# Patient Record
Sex: MALE | Race: ASIAN | NOT HISPANIC OR LATINO | Employment: FULL TIME | URBAN - METROPOLITAN AREA
[De-identification: names, ages, dates, MRNs, and addresses within clinical notes are randomized per-mention and may not be internally consistent; named-entity substitution may affect disease eponyms.]

---

## 2017-02-06 ENCOUNTER — HOSPITAL ENCOUNTER (OUTPATIENT)
Facility: MEDICAL CENTER | Age: 52
End: 2017-02-06
Attending: PHYSICIAN ASSISTANT
Payer: COMMERCIAL

## 2017-02-06 ENCOUNTER — OFFICE VISIT (OUTPATIENT)
Dept: URGENT CARE | Facility: CLINIC | Age: 52
End: 2017-02-06
Payer: COMMERCIAL

## 2017-02-06 VITALS
DIASTOLIC BLOOD PRESSURE: 72 MMHG | HEIGHT: 72 IN | HEART RATE: 98 BPM | RESPIRATION RATE: 16 BRPM | OXYGEN SATURATION: 96 % | TEMPERATURE: 98.3 F | WEIGHT: 189 LBS | SYSTOLIC BLOOD PRESSURE: 140 MMHG | BODY MASS INDEX: 25.6 KG/M2

## 2017-02-06 DIAGNOSIS — L05.01 PILONIDAL CYST WITH ABSCESS: ICD-10-CM

## 2017-02-06 PROCEDURE — 10061 I&D ABSCESS COMP/MULTIPLE: CPT | Performed by: PHYSICIAN ASSISTANT

## 2017-02-06 RX ORDER — AMOXICILLIN AND CLAVULANATE POTASSIUM 875; 125 MG/1; MG/1
1 TABLET, FILM COATED ORAL 2 TIMES DAILY
Qty: 14 TAB | Refills: 0 | Status: SHIPPED | OUTPATIENT
Start: 2017-02-06 | End: 2017-02-13

## 2017-02-06 RX ORDER — AMOXICILLIN 875 MG/1
875 TABLET, COATED ORAL 2 TIMES DAILY
Qty: 14 TAB | Refills: 0 | Status: SHIPPED | OUTPATIENT
Start: 2017-02-06 | End: 2017-02-06 | Stop reason: SDUPTHER

## 2017-02-06 ASSESSMENT — ENCOUNTER SYMPTOMS
SHORTNESS OF BREATH: 0
FEVER: 0
PALPITATIONS: 0
TINGLING: 0
COUGH: 0
CHILLS: 0
BACK PAIN: 1
LOSS OF CONSCIOUSNESS: 0
FOCAL WEAKNESS: 0

## 2017-02-06 NOTE — PROGRESS NOTES
"Subjective:      Israel Aviles is a 51 y.o. male who presents with Back Pain            Cyst  This is a new problem. The current episode started yesterday. The problem occurs constantly. The problem has been rapidly worsening. Pertinent negatives include no chest pain, chills, coughing, fever or rash. Associated symptoms comments: Swelling and drainage between the gluteal clefts. Nothing aggravates the symptoms. He has tried nothing for the symptoms.       Review of Systems   Constitutional: Negative for fever and chills.   Respiratory: Negative for cough and shortness of breath.    Cardiovascular: Negative for chest pain and palpitations.   Musculoskeletal: Positive for back pain.   Skin: Negative for rash.        Abscess in between the gluteal cleft   Neurological: Negative for tingling, focal weakness and loss of consciousness.   All other systems reviewed and are negative.  PMH:  has no past medical history on file.  MEDS:   Current outpatient prescriptions:   •  amoxicillin-clavulanate (AUGMENTIN) 875-125 MG Tab, Take 1 Tab by mouth 2 times a day for 7 days., Disp: 14 Tab, Rfl: 0  ALLERGIES: No Known Allergies  SURGHX: History reviewed. No pertinent past surgical history.  SOCHX:    FH: Family history was reviewed, no pertinent findings to report  Medications, Allergies, and current problem list reviewed today in Epic         Objective:     /72 mmHg  Pulse 98  Temp(Src) 36.8 °C (98.3 °F)  Resp 16  Ht 1.829 m (6' 0.01\")  Wt 85.73 kg (189 lb)  BMI 25.63 kg/m2  SpO2 96%     Physical Exam   Constitutional: He is oriented to person, place, and time. He appears well-developed and well-nourished.   Cardiovascular: Normal rate, regular rhythm, normal heart sounds, intact distal pulses and normal pulses.    Pulmonary/Chest: Effort normal and breath sounds normal.   Musculoskeletal: He exhibits tenderness. He exhibits no edema or deformity.        Legs:  Neurological: He is alert and oriented to person, " place, and time. He has normal reflexes. He displays normal reflexes. He exhibits normal muscle tone. Coordination normal.   Skin: There is erythema.   Abscess in the gluteal cleft   Psychiatric: He has a normal mood and affect. His behavior is normal. Judgment and thought content normal.   Vitals reviewed.              Assessment/Plan:   Patient is a 51-year-old male who presents with an abscess in between his gluteal cleft for 2 days. Patient states that he has had an abscess there before but 40 years ago. It is swollen and draining with moderate pain in the surrounding area. Patient denies fever. Vital signs normal. Observation appears to be pilonidal cyst that is semi-draining. Patient agrees to incision and drainage of the area. The area was cleaned with Betadine ×3 and injection of 2 cc of 1% lidocaine with epinephrine was injected. An 11 blade was then inserted into the area and a copious amount of purulent discharge was sinus from the area. It was cleansed with saline solution and packed with iodoform gauze. He is to return in 2 days for wound check. We will start him on Augmentin which is indicated for perianal abscesses. Cultures were taken.  1. Pilonidal cyst with abscess    - CULTURE WOUND W/ GRAM STAIN; Future  - amoxicillin-clavulanate (AUGMENTIN) 875-125 MG Tab; Take 1 Tab by mouth 2 times a day for 7 days.  Dispense: 14 Tab; Refill: 0     Differential diagnosis, natural history, supportive care, and indications for immediate follow-up discussed at length.   Follow-up with primary care provider within 4-5 days, emergency room precautions discussed.  Patient and/or family appears understanding of information.

## 2017-02-06 NOTE — MR AVS SNAPSHOT
"        Israel Aviles   2017 12:30 PM   Office Visit   MRN: [id number]    Department:  [facility]   Dept Phone:  [phone]    Description:  Male : 1965   Provider:  Carlos Westbrook PA-C           Reason for Visit     Back Pain small lumps-burst lower back x 1 day      Allergies as of 2017     No Known Allergies      You were diagnosed with     Pilonidal cyst with abscess   [685.0.ICD-9-CM]         Vital Signs     Blood Pressure Pulse Temperature Respirations Height Weight    140/72 mmHg 98 36.8 °C (98.3 °F) 16 1.829 m (6' 0.01\") 85.73 kg (189 lb)    Body Mass Index Oxygen Saturation                25.63 kg/m2 96%          Basic Information     Date Of Birth Sex Race Ethnicity Preferred Language    1965 Male Other  Origin (Turkmen,Israeli,Hong Konger,Lex, etc) English      Health Maintenance     Patient has no pending health maintenance at this time      Current Immunizations     No immunizations on file.      Below and/or attached are the medications your provider expects you to take. Review all of your home medications and newly ordered medications with your provider and/or pharmacist. Follow medication instructions as directed by your provider and/or pharmacist. Please keep your medication list with you and share with your provider. Update the information when medications are discontinued, doses are changed, or new medications (including over-the-counter products) are added; and carry medication information at all times in the event of emergency situations     Allergies:  No Known Allergies          Medications  Valid as of: 2017 -  1:36 PM    Generic Name Brand Name Tablet Size Instructions for use    Amoxicillin-Pot Clavulanate (Tab) AUGMENTIN 875-125 MG Take 1 Tab by mouth 2 times a day for 7 days.        .                 Medicines prescribed today were sent to:     [facility] - [place] (), [place] - [address]    [address] " [place] [address]    Phone: [phone] Fax: [phone]    Open 24 Hours?: No      Medication refill instructions:       If your prescription bottle indicates you have medication refills left, it is not necessary to call your provider’s office. Please contact your pharmacy and they will refill your medication.    If your prescription bottle indicates you do not have any refills left, you may request refills at any time through one of the following ways: The online [employer] system (except Urgent Care), by calling your provider’s office, or by asking your pharmacy to contact your provider’s office with a refill request. Medication refills are processed only during regular business hours and may not be available until the next business day. Your provider may request additional information or to have a follow-up visit with you prior to refilling your medication.   *Please Note: Medication refills are assigned a new Rx number when refilled electronically. Your pharmacy may indicate that no refills were authorized even though a new prescription for the same medication is available at the pharmacy. Please request the medicine by name with the pharmacy before contacting your provider for a refill.        Your To Do List     Future Labs/Procedures Complete By Expires    CULTURE WOUND W/ GRAM STAIN  As directed 2/6/2018      Instructions    Incision and Drainage of a Pilonidal Cyst  Incision and drainage is a surgical procedure to open and drain a fluid-filled sac that forms around a hair follicle in the tailbone area between your buttocks (pilonidal cyst). You may need this procedure if the cyst becomes painful, swollen, or infected.  There are three types of procedures that may be done. The type of procedure you have depends on the size and severity of your infected cyst. The procedure may be:  · Incision and drainage with a special type of bandage (wound packing). Packing is used for wounds that are deep or tunnel under  the skin.  · Marsupialization. In this procedure, the cyst will be opened and kept open. The edges of the incision will be stitched together to make a pocket.  · Incision and drainage without wound packing.  LET YOUR HEALTH CARE PROVIDER KNOW ABOUT:  · Any allergies you have.  · All medicines you are taking, including vitamins, herbs, eye drops, creams, and over-the-counter medicines.  · Previous problems you or members of your family have had with the use of anesthetics.  · Any blood disorders you have.  · Previous surgeries you have had.  · Medical conditions you have.  RISKS AND COMPLICATIONS  Generally, this is a safe procedure. However, problems can occur and include:  · Infection.  · Bleeding.  · Having another cyst develop.  · Need for more surgery.  BEFORE THE PROCEDURE  · Ask your health care provider about:  ¨ Changing or stopping your regular medicines. This is especially important if you are taking diabetes medicines or blood thinners.  ¨ Taking medicines such as aspirin and ibuprofen. These medicines can thin your blood. Do not take these medicines before your procedure if your health care provider tells you not to.  ¨ Taking antibiotics before surgery to control the infection.  · Do not eat or drink anything for 6-8 hours before the procedure if you are having general anesthesia.  · Take a shower the night before the procedure to clean your buttocks area. Take another shower in the morning before surgery.  · Plan to have someone take you home after the procedure.  PROCEDURE   · You will have an IV tube inserted in a vein in your hand or arm.  · You will be given one of the following:  ¨ A medicine that numbs the area (local anesthetic).  ¨ A medicine that makes you go to sleep (general anesthetic).  · You also may be given medicine to help you relax during the procedure (sedative).  · You will lie face down on the operating table.  · Your buttocks area may be shaved.  · Tape may be used to spread your  buttocks.  · Germ-killing solution (antiseptic) may be used to clean the area.  Incision and Drainage With Wound Packing:   · Your surgeon will make a surgical cut (incision) over the cyst to open it.  · A probe may be used to see if there are tunnels extending away from the cyst under your skin.  · Fluid or pus inside the cyst will be drained.  · The cyst will be flushed out with a germ-free (sterile) solution.  · Packing will be placed into the open cyst. This keeps it open and draining after surgery.  · The area will be covered with a bandage (dressing).  Marsupialization:  · Your surgeon will make a surgical cut (incision) over the cyst to open it.  · A probe may be used to see if there are tunnels extending away from the cyst under your skin.  · Fluid or pus inside the cyst will be drained.  · The cyst will be flushed out with a germ-free (sterile) solution.  · The edges of the incision will be stitched (sutured) to the skin to keep it wide open. The cyst will not be packed.  · A rolled-up bandage (dressing) will be taped over the incision.  Incision and Drainage Without Packing:   · Your surgeon will make a surgical cut (incision) over the cyst to open it.  · A probe may be used to see if there are tunnels extending away from the cyst under your skin.  · Fluid or pus inside the cyst will be drained.  · The cyst will be flushed out with a germ-free (sterile) solution.  · Your surgeon may also remove the tissue around the opened cyst.  · The incision then will be closed with stitches (sutures). It will not be left open, and packing will not be used.  · A bandage (dressing) will be put over the incision area.  AFTER THE PROCEDURE  · If you had general anesthesia, you will be taken to a recovery area. Your blood pressure, heart rate, breathing rate, and blood oxygen level will be monitored often until the medicines you were given have worn off.  · It is normal to have some pain after this procedure. You may be  given pain medicine.  · Your IV tube can be taken out after you have recovered and your pain is under control.     This information is not intended to replace advice given to you by your health care provider. Make sure you discuss any questions you have with your health care provider.     Document Released: 07/15/2015 Document Reviewed: 07/15/2015  [employer] Interactive Patient Education ©2016 Elsevier Inc.            Squee Access Code: [email or web address]  Expires: 3/8/2017  1:22 PM    Your email address is not on file at [facility].  Email Addresses are required for you to sign up for Squee, please contact [phone] to verify your personal information and to provide your email address prior to attempting to register for Squee.    Israel Aviles  [address]  [place] [address]    Squee  A secure, online tool to manage your health information     [facility] [facility]’s Squee® is a secure, online tool that connects you to your personalized health information from the privacy of your home -- day or night - making it very easy for you to manage your healthcare. Once the activation process is completed, you can even access your medical information using the Squee bridgett, which is available for free in the Apple Bridgett store or Google Play store.     To learn more about Squee, visit [email or web address]    There are two levels of access available (as shown below):   My Chart Features  [facility] Primary Care Doctor [facility]  Specialists [facility]  Urgent  Care Non-[facility] Primary Care Doctor   Email your healthcare team securely and privately 24/7 X X X    Manage appointments: schedule your next appointment; view details of past/upcoming appointments X      Request prescription refills. X      View recent personal medical records, including lab and immunizations X X X X   View health record, including health history, allergies, medications X X X X   Read reports about your outpatient visits, procedures, consult and  ER notes X X X X   See your discharge summary, which is a recap of your hospital and/or ER visit that includes your diagnosis, lab results, and care plan X X  X     How to register for [employer]:  Once your e-mail address has been verified, follow the following steps to sign up for [employer].     1. Go to  [email or web address]  2. Click on the Sign Up Now box, which takes you to the New Member Sign Up page. You will need to provide the following information:  a. Enter your [employer] Access Code exactly as it appears at the top of this page. (You will not need to use this code after you’ve completed the sign-up process. If you do not sign up before the expiration date, you must request a new code.)   b. Enter your date of birth.   c. Enter your home email address.   d. Click Submit, and follow the next screen’s instructions.  3. Create a [employer] ID. This will be your [employer] login ID and cannot be changed, so think of one that is secure and easy to remember.  4. Create a [employer] password. You can change your password at any time.  5. Enter your Password Reset Question and Answer. This can be used at a later time if you forget your password.   6. Enter your e-mail address. This allows you to receive e-mail notifications when new information is available in [employer].  7. Click Sign Up. You can now view your health information.    For assistance activating your [employer] account, call [phone]

## 2017-02-06 NOTE — PATIENT INSTRUCTIONS
Incision and Drainage of a Pilonidal Cyst  Incision and drainage is a surgical procedure to open and drain a fluid-filled sac that forms around a hair follicle in the tailbone area between your buttocks (pilonidal cyst). You may need this procedure if the cyst becomes painful, swollen, or infected.  There are three types of procedures that may be done. The type of procedure you have depends on the size and severity of your infected cyst. The procedure may be:  · Incision and drainage with a special type of bandage (wound packing). Packing is used for wounds that are deep or tunnel under the skin.  · Marsupialization. In this procedure, the cyst will be opened and kept open. The edges of the incision will be stitched together to make a pocket.  · Incision and drainage without wound packing.  LET YOUR HEALTH CARE PROVIDER KNOW ABOUT:  · Any allergies you have.  · All medicines you are taking, including vitamins, herbs, eye drops, creams, and over-the-counter medicines.  · Previous problems you or members of your family have had with the use of anesthetics.  · Any blood disorders you have.  · Previous surgeries you have had.  · Medical conditions you have.  RISKS AND COMPLICATIONS  Generally, this is a safe procedure. However, problems can occur and include:  · Infection.  · Bleeding.  · Having another cyst develop.  · Need for more surgery.  BEFORE THE PROCEDURE  · Ask your health care provider about:  ¨ Changing or stopping your regular medicines. This is especially important if you are taking diabetes medicines or blood thinners.  ¨ Taking medicines such as aspirin and ibuprofen. These medicines can thin your blood. Do not take these medicines before your procedure if your health care provider tells you not to.  ¨ Taking antibiotics before surgery to control the infection.  · Do not eat or drink anything for 6-8 hours before the procedure if you are having general anesthesia.  · Take a shower the night before the  procedure to clean your buttocks area. Take another shower in the morning before surgery.  · Plan to have someone take you home after the procedure.  PROCEDURE   · You will have an IV tube inserted in a vein in your hand or arm.  · You will be given one of the following:  ¨ A medicine that numbs the area (local anesthetic).  ¨ A medicine that makes you go to sleep (general anesthetic).  · You also may be given medicine to help you relax during the procedure (sedative).  · You will lie face down on the operating table.  · Your buttocks area may be shaved.  · Tape may be used to spread your buttocks.  · Germ-killing solution (antiseptic) may be used to clean the area.  Incision and Drainage With Wound Packing:   · Your surgeon will make a surgical cut (incision) over the cyst to open it.  · A probe may be used to see if there are tunnels extending away from the cyst under your skin.  · Fluid or pus inside the cyst will be drained.  · The cyst will be flushed out with a germ-free (sterile) solution.  · Packing will be placed into the open cyst. This keeps it open and draining after surgery.  · The area will be covered with a bandage (dressing).  Marsupialization:  · Your surgeon will make a surgical cut (incision) over the cyst to open it.  · A probe may be used to see if there are tunnels extending away from the cyst under your skin.  · Fluid or pus inside the cyst will be drained.  · The cyst will be flushed out with a germ-free (sterile) solution.  · The edges of the incision will be stitched (sutured) to the skin to keep it wide open. The cyst will not be packed.  · A rolled-up bandage (dressing) will be taped over the incision.  Incision and Drainage Without Packing:   · Your surgeon will make a surgical cut (incision) over the cyst to open it.  · A probe may be used to see if there are tunnels extending away from the cyst under your skin.  · Fluid or pus inside the cyst will be drained.  · The cyst will be  flushed out with a germ-free (sterile) solution.  · Your surgeon may also remove the tissue around the opened cyst.  · The incision then will be closed with stitches (sutures). It will not be left open, and packing will not be used.  · A bandage (dressing) will be put over the incision area.  AFTER THE PROCEDURE  · If you had general anesthesia, you will be taken to a recovery area. Your blood pressure, heart rate, breathing rate, and blood oxygen level will be monitored often until the medicines you were given have worn off.  · It is normal to have some pain after this procedure. You may be given pain medicine.  · Your IV tube can be taken out after you have recovered and your pain is under control.     This information is not intended to replace advice given to you by your health care provider. Make sure you discuss any questions you have with your health care provider.     Document Released: 07/15/2015 Document Reviewed: 07/15/2015  [employer] Interactive Patient Education ©2016 Elsevier Inc.

## 2018-06-15 ENCOUNTER — APPOINTMENT (OUTPATIENT)
Dept: RADIOLOGY | Facility: MEDICAL CENTER | Age: 53
DRG: 066 | End: 2018-06-15
Attending: EMERGENCY MEDICINE
Payer: COMMERCIAL

## 2018-06-15 ENCOUNTER — HOSPITAL ENCOUNTER (INPATIENT)
Facility: MEDICAL CENTER | Age: 53
LOS: 2 days | DRG: 066 | End: 2018-06-17
Attending: EMERGENCY MEDICINE | Admitting: FAMILY MEDICINE
Payer: COMMERCIAL

## 2018-06-15 ENCOUNTER — OFFICE VISIT (OUTPATIENT)
Dept: URGENT CARE | Facility: CLINIC | Age: 53
End: 2018-06-15
Payer: COMMERCIAL

## 2018-06-15 VITALS
HEIGHT: 72 IN | WEIGHT: 184 LBS | BODY MASS INDEX: 24.92 KG/M2 | RESPIRATION RATE: 16 BRPM | OXYGEN SATURATION: 98 % | SYSTOLIC BLOOD PRESSURE: 158 MMHG | HEART RATE: 80 BPM | DIASTOLIC BLOOD PRESSURE: 84 MMHG | TEMPERATURE: 98.2 F

## 2018-06-15 DIAGNOSIS — I63.9 CEREBROVASCULAR ACCIDENT (CVA), UNSPECIFIED MECHANISM (HCC): ICD-10-CM

## 2018-06-15 DIAGNOSIS — R47.81 SLURRED SPEECH: ICD-10-CM

## 2018-06-15 DIAGNOSIS — R29.810 FACIAL DROOP: ICD-10-CM

## 2018-06-15 LAB
ALBUMIN SERPL BCP-MCNC: 4.3 G/DL (ref 3.2–4.9)
ALBUMIN/GLOB SERPL: 0.9 G/DL
ALP SERPL-CCNC: 72 U/L (ref 30–99)
ALT SERPL-CCNC: 29 U/L (ref 2–50)
ANION GAP SERPL CALC-SCNC: 10 MMOL/L (ref 0–11.9)
APTT PPP: 29.1 SEC (ref 24.7–36)
AST SERPL-CCNC: 23 U/L (ref 12–45)
BASOPHILS # BLD AUTO: 0.1 % (ref 0–1.8)
BASOPHILS # BLD: 0.01 K/UL (ref 0–0.12)
BILIRUB SERPL-MCNC: 0.6 MG/DL (ref 0.1–1.5)
BUN SERPL-MCNC: 15 MG/DL (ref 8–22)
CALCIUM SERPL-MCNC: 9.7 MG/DL (ref 8.5–10.5)
CHLORIDE SERPL-SCNC: 100 MMOL/L (ref 96–112)
CHOLEST SERPL-MCNC: 262 MG/DL (ref 100–199)
CO2 SERPL-SCNC: 26 MMOL/L (ref 20–33)
CREAT SERPL-MCNC: 0.85 MG/DL (ref 0.5–1.4)
EOSINOPHIL # BLD AUTO: 0.01 K/UL (ref 0–0.51)
EOSINOPHIL NFR BLD: 0.1 % (ref 0–6.9)
ERYTHROCYTE [DISTWIDTH] IN BLOOD BY AUTOMATED COUNT: 42.7 FL (ref 35.9–50)
GLOBULIN SER CALC-MCNC: 4.7 G/DL (ref 1.9–3.5)
GLUCOSE SERPL-MCNC: 313 MG/DL (ref 65–99)
HCT VFR BLD AUTO: 45.2 % (ref 42–52)
HDLC SERPL-MCNC: 54 MG/DL
HGB BLD-MCNC: 15.6 G/DL (ref 14–18)
IMM GRANULOCYTES # BLD AUTO: 0.03 K/UL (ref 0–0.11)
IMM GRANULOCYTES NFR BLD AUTO: 0.4 % (ref 0–0.9)
INR PPP: 1.01 (ref 0.87–1.13)
LDLC SERPL CALC-MCNC: 182 MG/DL
LYMPHOCYTES # BLD AUTO: 1.46 K/UL (ref 1–4.8)
LYMPHOCYTES NFR BLD: 18 % (ref 22–41)
MCH RBC QN AUTO: 30.8 PG (ref 27–33)
MCHC RBC AUTO-ENTMCNC: 34.5 G/DL (ref 33.7–35.3)
MCV RBC AUTO: 89.2 FL (ref 81.4–97.8)
MONOCYTES # BLD AUTO: 0.12 K/UL (ref 0–0.85)
MONOCYTES NFR BLD AUTO: 1.5 % (ref 0–13.4)
NEUTROPHILS # BLD AUTO: 6.5 K/UL (ref 1.82–7.42)
NEUTROPHILS NFR BLD: 79.9 % (ref 44–72)
NRBC # BLD AUTO: 0 K/UL
NRBC BLD-RTO: 0 /100 WBC
PLATELET # BLD AUTO: 178 K/UL (ref 164–446)
PMV BLD AUTO: 11.2 FL (ref 9–12.9)
POTASSIUM SERPL-SCNC: 3.8 MMOL/L (ref 3.6–5.5)
PROT SERPL-MCNC: 9 G/DL (ref 6–8.2)
PROTHROMBIN TIME: 13 SEC (ref 12–14.6)
RBC # BLD AUTO: 5.07 M/UL (ref 4.7–6.1)
SODIUM SERPL-SCNC: 136 MMOL/L (ref 135–145)
TRIGL SERPL-MCNC: 132 MG/DL (ref 0–149)
TROPONIN I SERPL-MCNC: <0.01 NG/ML (ref 0–0.04)
TSH SERPL DL<=0.005 MIU/L-ACNC: 1.78 UIU/ML (ref 0.38–5.33)
WBC # BLD AUTO: 8.1 K/UL (ref 4.8–10.8)

## 2018-06-15 PROCEDURE — 700111 HCHG RX REV CODE 636 W/ 250 OVERRIDE (IP): Performed by: EMERGENCY MEDICINE

## 2018-06-15 PROCEDURE — 700102 HCHG RX REV CODE 250 W/ 637 OVERRIDE(OP): Performed by: EMERGENCY MEDICINE

## 2018-06-15 PROCEDURE — 85610 PROTHROMBIN TIME: CPT

## 2018-06-15 PROCEDURE — 70551 MRI BRAIN STEM W/O DYE: CPT

## 2018-06-15 PROCEDURE — 770006 HCHG ROOM/CARE - MED/SURG/GYN SEMI*

## 2018-06-15 PROCEDURE — 70486 CT MAXILLOFACIAL W/O DYE: CPT

## 2018-06-15 PROCEDURE — 99285 EMERGENCY DEPT VISIT HI MDM: CPT

## 2018-06-15 PROCEDURE — 80061 LIPID PANEL: CPT

## 2018-06-15 PROCEDURE — 83036 HEMOGLOBIN GLYCOSYLATED A1C: CPT

## 2018-06-15 PROCEDURE — 80053 COMPREHEN METABOLIC PANEL: CPT

## 2018-06-15 PROCEDURE — A9270 NON-COVERED ITEM OR SERVICE: HCPCS | Performed by: EMERGENCY MEDICINE

## 2018-06-15 PROCEDURE — 84443 ASSAY THYROID STIM HORMONE: CPT

## 2018-06-15 PROCEDURE — 99215 OFFICE O/P EST HI 40 MIN: CPT | Performed by: PHYSICIAN ASSISTANT

## 2018-06-15 PROCEDURE — 85025 COMPLETE CBC W/AUTO DIFF WBC: CPT

## 2018-06-15 PROCEDURE — 70450 CT HEAD/BRAIN W/O DYE: CPT

## 2018-06-15 PROCEDURE — 85730 THROMBOPLASTIN TIME PARTIAL: CPT

## 2018-06-15 PROCEDURE — 84484 ASSAY OF TROPONIN QUANT: CPT

## 2018-06-15 RX ORDER — BISACODYL 10 MG
10 SUPPOSITORY, RECTAL RECTAL
Status: DISCONTINUED | OUTPATIENT
Start: 2018-06-15 | End: 2018-06-17 | Stop reason: HOSPADM

## 2018-06-15 RX ORDER — AMOXICILLIN 250 MG
2 CAPSULE ORAL 2 TIMES DAILY
Status: DISCONTINUED | OUTPATIENT
Start: 2018-06-15 | End: 2018-06-17 | Stop reason: HOSPADM

## 2018-06-15 RX ORDER — ASPIRIN 325 MG
325 TABLET ORAL ONCE
Status: DISCONTINUED | OUTPATIENT
Start: 2018-06-15 | End: 2018-06-16

## 2018-06-15 RX ORDER — METFORMIN HYDROCHLORIDE 500 MG/1
500 TABLET, EXTENDED RELEASE ORAL DAILY
COMMUNITY
End: 2019-07-05

## 2018-06-15 RX ORDER — ATORVASTATIN CALCIUM 10 MG/1
10 TABLET, FILM COATED ORAL
Status: DISCONTINUED | OUTPATIENT
Start: 2018-06-15 | End: 2018-06-16

## 2018-06-15 RX ORDER — ENALAPRILAT 1.25 MG/ML
1.25 INJECTION INTRAVENOUS EVERY 6 HOURS PRN
Status: DISCONTINUED | OUTPATIENT
Start: 2018-06-15 | End: 2018-06-17 | Stop reason: HOSPADM

## 2018-06-15 RX ORDER — DEXTROSE MONOHYDRATE 25 G/50ML
25 INJECTION, SOLUTION INTRAVENOUS
Status: DISCONTINUED | OUTPATIENT
Start: 2018-06-15 | End: 2018-06-17 | Stop reason: HOSPADM

## 2018-06-15 RX ORDER — DIPHENHYDRAMINE HCL 25 MG
25 TABLET ORAL ONCE
Status: COMPLETED | OUTPATIENT
Start: 2018-06-15 | End: 2018-06-15

## 2018-06-15 RX ORDER — POLYETHYLENE GLYCOL 3350 17 G/17G
1 POWDER, FOR SOLUTION ORAL
Status: DISCONTINUED | OUTPATIENT
Start: 2018-06-15 | End: 2018-06-17 | Stop reason: HOSPADM

## 2018-06-15 RX ORDER — PROMETHAZINE HYDROCHLORIDE 12.5 MG/1
12.5-25 SUPPOSITORY RECTAL EVERY 4 HOURS PRN
Status: DISCONTINUED | OUTPATIENT
Start: 2018-06-15 | End: 2018-06-17 | Stop reason: HOSPADM

## 2018-06-15 RX ORDER — SODIUM CHLORIDE 9 MG/ML
INJECTION, SOLUTION INTRAVENOUS CONTINUOUS
Status: DISCONTINUED | OUTPATIENT
Start: 2018-06-15 | End: 2018-06-16

## 2018-06-15 RX ORDER — PREDNISONE 20 MG/1
60 TABLET ORAL ONCE
Status: COMPLETED | OUTPATIENT
Start: 2018-06-15 | End: 2018-06-15

## 2018-06-15 RX ORDER — GLIMEPIRIDE 4 MG/1
4 TABLET ORAL EVERY MORNING
COMMUNITY
End: 2019-05-09

## 2018-06-15 RX ORDER — PROMETHAZINE HYDROCHLORIDE 25 MG/1
12.5-25 TABLET ORAL EVERY 4 HOURS PRN
Status: DISCONTINUED | OUTPATIENT
Start: 2018-06-15 | End: 2018-06-17 | Stop reason: HOSPADM

## 2018-06-15 RX ORDER — METFORMIN HYDROCHLORIDE 500 MG/1
500 TABLET, EXTENDED RELEASE ORAL DAILY
COMMUNITY
End: 2018-06-15

## 2018-06-15 RX ORDER — ACETAMINOPHEN 325 MG/1
650 TABLET ORAL EVERY 6 HOURS PRN
Status: DISCONTINUED | OUTPATIENT
Start: 2018-06-15 | End: 2018-06-17 | Stop reason: HOSPADM

## 2018-06-15 RX ORDER — ONDANSETRON 4 MG/1
4 TABLET, ORALLY DISINTEGRATING ORAL EVERY 4 HOURS PRN
Status: DISCONTINUED | OUTPATIENT
Start: 2018-06-15 | End: 2018-06-17 | Stop reason: HOSPADM

## 2018-06-15 RX ORDER — ONDANSETRON 2 MG/ML
4 INJECTION INTRAMUSCULAR; INTRAVENOUS EVERY 4 HOURS PRN
Status: DISCONTINUED | OUTPATIENT
Start: 2018-06-15 | End: 2018-06-17 | Stop reason: HOSPADM

## 2018-06-15 RX ADMIN — PREDNISONE 60 MG: 20 TABLET ORAL at 17:39

## 2018-06-15 RX ADMIN — DIPHENHYDRAMINE HCL 25 MG: 25 TABLET ORAL at 17:39

## 2018-06-15 ASSESSMENT — ENCOUNTER SYMPTOMS
SORE THROAT: 0
FOCAL WEAKNESS: 1
VOMITING: 0
SPEECH CHANGE: 1
MYALGIAS: 0
NUMBNESS: 0
BLURRED VISION: 0
TINGLING: 0
WEAKNESS: 1
ANOREXIA: 0
JOINT SWELLING: 0
PALPITATIONS: 0
FEVER: 0
SWOLLEN GLANDS: 0
PHOTOPHOBIA: 0
COUGH: 0
CHANGE IN BOWEL HABIT: 0
SENSORY CHANGE: 1
SHORTNESS OF BREATH: 0
HEADACHES: 0
DOUBLE VISION: 0
DIZZINESS: 0
NAUSEA: 0
VISUAL CHANGE: 0

## 2018-06-15 ASSESSMENT — PAIN SCALES - GENERAL: PAINLEVEL_OUTOF10: 0

## 2018-06-15 NOTE — PROGRESS NOTES
Subjective:      Israel Aviles is a 52 y.o. male who presents with Other (right side of face is tingling/numb and drooping )            Other   This is a new problem. The current episode started today (6:30 am- 7 hours ago ). The problem occurs constantly. The problem has been unchanged. Associated symptoms include weakness. Pertinent negatives include no anorexia, change in bowel habit, chest pain, congestion, coughing, fever, headaches, joint swelling, myalgias, nausea, numbness, rash, sore throat, swollen glands, urinary symptoms, visual change or vomiting. Associated symptoms comments: Slurred speech, right lower facial drooping. Nothing aggravates the symptoms. He has tried nothing for the symptoms.     Past Medical History:   Diagnosis Date   • Diabetes (HCC)        History reviewed. No pertinent surgical history.    History reviewed. No pertinent family history.    Review of Systems   Constitutional: Negative for fever.   HENT: Negative for congestion, ear discharge, ear pain and sore throat.    Eyes: Negative for blurred vision, double vision and photophobia.   Respiratory: Negative for cough and shortness of breath.    Cardiovascular: Negative for chest pain, palpitations and leg swelling.   Gastrointestinal: Negative for anorexia, change in bowel habit, nausea and vomiting.   Genitourinary: Negative for dysuria.   Musculoskeletal: Negative for joint swelling and myalgias.   Skin: Negative for rash.   Neurological: Positive for sensory change, speech change, focal weakness and weakness. Negative for dizziness, tingling, numbness and headaches.     All other systems reviewed and are negative.        Objective:     /84   Pulse 80   Temp 36.8 °C (98.2 °F)   Resp 16   Ht 1.829 m (6')   Wt 83.5 kg (184 lb)   SpO2 98%   BMI 24.95 kg/m²      Physical Exam   Constitutional: He is oriented to person, place, and time. He appears well-developed and well-nourished. No distress.   HENT:   Head:  Normocephalic and atraumatic.       Right Ear: Tympanic membrane, external ear and ear canal normal.   Left Ear: Tympanic membrane, external ear and ear canal normal.   Eyes: Conjunctivae and EOM are normal. Pupils are equal, round, and reactive to light.   Cardiovascular: Normal rate, regular rhythm and normal heart sounds.  Exam reveals no gallop and no friction rub.    No murmur heard.  Pulmonary/Chest: Effort normal and breath sounds normal. No respiratory distress. He has no wheezes. He has no rales.   Neurological: He is alert and oriented to person, place, and time. A cranial nerve deficit is present. No sensory deficit.   Skin: Skin is warm and dry. No rash noted.   Psychiatric: He has a normal mood and affect. His behavior is normal. Judgment and thought content normal. His speech is slurred.               Assessment/Plan:     1. Facial droop    2. Slurred speech    - Recommended [facility] transport to ER- patient and son refused as ER is right across the street. Patient signed AMA refusing [facility]  - Was concerned about possible Bell's Palsy but deficit is only localized to right lower face/mouth- otherwise neuro exam benign without pronator drift or one-sided body weakness  .  - UC AMA/Refusal of Treatment    Tried calling [facility] multiple times to speak with ERMD without answer.    Arianna Dunn P.A.-C.

## 2018-06-15 NOTE — ED NOTES
Assumed care of pt from KEENA Smith. Pt resting in stretcher. Resp even and unlabored. NAD. Fall precautions in place. Call bell in reach. Awaiting MD orders. Ongoing monitoring.

## 2018-06-15 NOTE — ED TRIAGE NOTES
Ambulates to triage with son  Chief Complaint   Patient presents with   • Sent by MD   • Facial Droop     R side facial droop, no other neuro deficits,  strong and equal     Noticed this at work today, no slurred speech or confusion, denies any vision changes to R eye.  Pt said he also has some tingling to the R side of his face.

## 2018-06-15 NOTE — ED NOTES
Pt roomed from [facility]. Gait steady. Pt presents for symptoms as stated in the triage note. Pt is accompanied by son. Pt states symptom onset was 0630 this AM. Pt reports R sided facial droop with numbness that has resolved. Pt denies any pain. Airway is patent. R cheek and lips are mildly swollen. Neuro exam is otherwise intact aside from R side facial droop.

## 2018-06-15 NOTE — ED PROVIDER NOTES
ED Provider Note    Scribed for Nirali Cheema M.D. by Curry Biggs. 6/15/2018, 2:51 PM.    Primary care provider: Pcp Pt States None  Means of arrival: Walk In  History obtained from: Patient  History limited by: None     CHIEF COMPLAINT  Chief Complaint   Patient presents with   • Sent by MD   • Facial Droop     R side facial droop, no other neuro deficits,  strong and equal     HPI  Israel Aviles is a 52 y.o. male who presents to the Emergency Department with right facial droop.   The patient reports he first noticed a right facial droop at 6:30 AM this morning. He reports his symptoms have been constant. He complains of right jaw pain that only occurs while actively yawning. The patient does tell me that he slept with his right face resting over his right arm during the night last night.   The patient denies any history of similar symptoms.   He denies any other focal weakness other than his right facial droop. He denies recent trauma or injury to his face. The patient has a past medical history of Diabetes. He denies any other past medical history. The patient has tolerated secretions without difficulty.   The patient denies any headache, nausea, vomiting, numbness, vision changes, or slurred speech.     REVIEW OF SYSTEMS  Pertinent positives include right facial droop. Pertinent negatives include no headache, nausea, vomiting, numbness, vision changes, or slurred speech.     PAST MEDICAL HISTORY   has a past medical history of Diabetes (HCC).    SURGICAL HISTORY  patient denies any surgical history    SOCIAL HISTORY  Social History   Substance Use Topics   • Smoking status: Never Smoker   • Smokeless tobacco: Never Used   • Alcohol use No      History   Drug Use No     FAMILY HISTORY  None noted.     CURRENT MEDICATIONS  Home Medications     Reviewed by Alesia Heredia R.N. (Registered Nurse) on 06/16/18 at 0203  Med List Status: Complete   Medication Last Dose Status   glimepiride (AMARYL)  4 MG Tab 6/15/2018 Active   metFORMIN ER (GLUCOPHAGE XR) 500 MG TABLET SR 24 HR 6/15/2018 Active              ALLERGIES  No Known Allergies    PHYSICAL EXAM  VITAL SIGNS: /94   Pulse 88   Temp 36.7 °C (98 °F) (Temporal)   Resp 14   Ht 1.829 m (6')   Wt 85.6 kg (188 lb 11.4 oz)   SpO2 97%   BMI 25.59 kg/m²   Constitutional: Alert in no apparent distress.  HENT: No signs of trauma, Bilateral external ears normal, Nose normal. No obvious facial swelling.   Eyes: Pupils are equal and reactive, Conjunctiva normal, Non-icteric.   Neck: Normal range of motion, No tenderness, Supple, No stridor.   Cardiovascular: Regular rate and rhythm, no murmurs.   Thorax & Lungs: Normal breath sounds, No respiratory distress, No wheezing, No chest tenderness.   Abdomen: Bowel sounds normal, Soft, No tenderness, No masses, No peritoneal signs.  Skin: Warm, Dry, No erythema, No rash.   Musculoskeletal:  No major deformities noted.   Neurologic: Slight decreased nasal labial fold at rest, no forehead involvement or difficulty closing his eye, but able to smile fully. No dense facial paralysis. Alert, moving all extremities without difficulty.     LABS  Labs Reviewed   CBC WITH DIFFERENTIAL - Abnormal; Notable for the following:        Result Value    Neutrophils-Polys 79.90 (*)     Lymphocytes 18.00 (*)     All other components within normal limits    Narrative:     Indicate which anticoagulants the patient is on:->NONE   COMP METABOLIC PANEL - Abnormal; Notable for the following:     Glucose 313 (*)     Total Protein 9.0 (*)     Globulin 4.7 (*)     All other components within normal limits    Narrative:     Indicate which anticoagulants the patient is on:->NONE   HEMOGLOBIN A1C - Abnormal; Notable for the following:     Glycohemoglobin 12.3 (*)     All other components within normal limits   LIPID PROFILE - Abnormal; Notable for the following:     Cholesterol,Tot 262 (*)      (*)     All other components within normal  limits    Narrative:     Indicate which anticoagulants the patient is on:->NONE   ACCU-CHEK GLUCOSE - Abnormal; Notable for the following:     Glucose - Accu-Ck 285 (*)     All other components within normal limits   ACCU-CHEK GLUCOSE - Abnormal; Notable for the following:     Glucose - Accu-Ck 277 (*)     All other components within normal limits   ACCU-CHEK GLUCOSE - Abnormal; Notable for the following:     Glucose - Accu-Ck 294 (*)     All other components within normal limits   ACCU-CHEK GLUCOSE - Abnormal; Notable for the following:     Glucose - Accu-Ck 357 (*)     All other components within normal limits   ACCU-CHEK GLUCOSE - Abnormal; Notable for the following:     Glucose - Accu-Ck 293 (*)     All other components within normal limits   TROPONIN    Narrative:     Indicate which anticoagulants the patient is on:->NONE   PROTHROMBIN TIME    Narrative:     Indicate which anticoagulants the patient is on:->NONE   APTT    Narrative:     Indicate which anticoagulants the patient is on:->NONE   ESTIMATED GFR    Narrative:     Indicate which anticoagulants the patient is on:->NONE   TSH   URINE DRUG SCREEN   CBC WITH DIFFERENTIAL   COMP METABOLIC PANEL     All labs reviewed by me.    RADIOLOGY  MR-MRA HEAD-W/O   Final Result         MRA OF THE [place] OF [place] WITHIN NORMAL LIMITS.      MR-MRA NECK-W/O   Final Result         1.  Normal MR angiogram of the carotid arteries and vertebral basilar system..      MR-BRAIN-W/O   Final Result            1.  Small ovoid focus of acute infarction involving the left posterior frontal periventricular white matter.      2.  Small acute focus of infarction in the left posterior frontal periventricular white matter.      3.  Mild periventricular and juxtacortical white matter changes consistent with chronic microvascular ischemic gliosis.      4.  Small chronic focus of lacunar type infarction in the right paramedian daysi.      CT-MAXILLOFACIAL W/O PLUS RECONS   Final Result       1.  No facial fracture.   2.  Chronic maxillary sinus disease, more extensive on the RIGHT.   3.  No focal soft tissue inflammatory changes.  Limited by lack of IV contrast.      CT-HEAD W/O   Final Result      No acute intracranial abnormality.      ECHOCARDIOGRAM COMP W/O CONT    (Results Pending)     The radiologist's interpretation of all radiological studies have been reviewed by me.    COURSE & MEDICAL DECISION MAKING  Pertinent Labs & Imaging studies reviewed. (See chart for details)    Differential diagnoses include but are not limited to: Bell's palsy, CVA    2:51 PM - Patient seen and examined at bedside. Ordered CT Head, CT Maxillofacial, MR Brain to evaluate his symptoms.      5:20 PM Recheck: Patient re-evaluated at beside. Discussed imaging study results with the patient. He was updated of plan of care and medical decision making. He was treated with Benadryl 25 mg PO, Deltasone 60 mg PO.     9:49 PM Spoke with Dr. Lira, [facility], about the patient's condition.      Decision Making:  This is a 52 y.o. year old male who presents with right-sided facial droop.  On exam he had a slight droop of his right nasolabial fold however with effort patient had no asymmetry.  Initially I felt this could be Bell's however CVA is possible.  His symptoms started at 6:30 AM and on presentation at 2:50 PM he was well out of the window for alteplase.  I ordered a CT scan of his head and maxillofacial initially which were normal.  On reexamination and discussing more with his family  his wife mentioned that he had trouble with drooling out of that side of the mouth and that his speech was very slurred.  They also then informed me that he has a history of diabetes.  Given this additional history and odd presentation I did obtain an MRI.  The MRI did show evidence of acute CVA.  Given this the patient will be admitted to the family medicine service.  Given that he is well out of the alteplase window did not  think telemetry neuro consult would be helpful at this time.  Patient will be admitted for further workup.    Disposition:  Patient will be admitted to the hospital under the care of Dr. Lira ([facility]) in guarded condition.     FINAL IMPRESSION  1. Cerebrovascular accident (CVA), unspecified mechanism (HCC)         This dictation has been created using voice recognition software and/or scribes. The accuracy of the dictation is limited by the abilities of the software and the expertise of the scribes. I expect there may be some errors of grammar and possibly content. I made every attempt to manually correct the errors within my dictation. However, errors related to voice recognition software and/or scribes may still exist and should be interpreted within the appropriate context.     I, Curry Biggs (Scribe), am scribing for, and in the presence of, Nirali Cheema M.D..    Electronically signed by: Curry Biggs (Scribe), 6/15/2018    I, Nirali Cheema M.D. personally performed the services described in this documentation, as scribed by Curry Biggs in my presence, and it is both accurate and complete.    The note accurately reflects work and decisions made by me.  Nirali Cheema  6/17/2018  1:36 AM

## 2018-06-16 ENCOUNTER — APPOINTMENT (OUTPATIENT)
Dept: RADIOLOGY | Facility: MEDICAL CENTER | Age: 53
DRG: 066 | End: 2018-06-16
Attending: FAMILY MEDICINE
Payer: COMMERCIAL

## 2018-06-16 LAB
AMPHET UR QL SCN: NEGATIVE
BARBITURATES UR QL SCN: NEGATIVE
BENZODIAZ UR QL SCN: NEGATIVE
BZE UR QL SCN: NEGATIVE
CANNABINOIDS UR QL SCN: NEGATIVE
EST. AVERAGE GLUCOSE BLD GHB EST-MCNC: 306 MG/DL
GLUCOSE BLD-MCNC: 277 MG/DL (ref 65–99)
GLUCOSE BLD-MCNC: 285 MG/DL (ref 65–99)
GLUCOSE BLD-MCNC: 294 MG/DL (ref 65–99)
GLUCOSE BLD-MCNC: 357 MG/DL (ref 65–99)
HBA1C MFR BLD: 12.3 % (ref 0–5.6)
METHADONE UR QL SCN: NEGATIVE
OPIATES UR QL SCN: NEGATIVE
OXYCODONE UR QL SCN: NEGATIVE
PCP UR QL SCN: NEGATIVE
PROPOXYPH UR QL SCN: NEGATIVE

## 2018-06-16 PROCEDURE — 700102 HCHG RX REV CODE 250 W/ 637 OVERRIDE(OP): Performed by: FAMILY MEDICINE

## 2018-06-16 PROCEDURE — 70544 MR ANGIOGRAPHY HEAD W/O DYE: CPT

## 2018-06-16 PROCEDURE — 82962 GLUCOSE BLOOD TEST: CPT | Mod: 91

## 2018-06-16 PROCEDURE — G8997 SWALLOW GOAL STATUS: HCPCS | Mod: CH

## 2018-06-16 PROCEDURE — 70547 MR ANGIOGRAPHY NECK W/O DYE: CPT

## 2018-06-16 PROCEDURE — 770020 HCHG ROOM/CARE - TELE (206)

## 2018-06-16 PROCEDURE — G8979 MOBILITY GOAL STATUS: HCPCS | Mod: CH

## 2018-06-16 PROCEDURE — 80307 DRUG TEST PRSMV CHEM ANLYZR: CPT

## 2018-06-16 PROCEDURE — 700111 HCHG RX REV CODE 636 W/ 250 OVERRIDE (IP): Performed by: FAMILY MEDICINE

## 2018-06-16 PROCEDURE — G8980 MOBILITY D/C STATUS: HCPCS | Mod: CH

## 2018-06-16 PROCEDURE — G8996 SWALLOW CURRENT STATUS: HCPCS | Mod: CI

## 2018-06-16 PROCEDURE — 700105 HCHG RX REV CODE 258: Performed by: FAMILY MEDICINE

## 2018-06-16 PROCEDURE — G8978 MOBILITY CURRENT STATUS: HCPCS | Mod: CH

## 2018-06-16 PROCEDURE — 97161 PT EVAL LOW COMPLEX 20 MIN: CPT

## 2018-06-16 PROCEDURE — A9270 NON-COVERED ITEM OR SERVICE: HCPCS | Performed by: FAMILY MEDICINE

## 2018-06-16 PROCEDURE — 92610 EVALUATE SWALLOWING FUNCTION: CPT

## 2018-06-16 RX ORDER — ATORVASTATIN CALCIUM 40 MG/1
40 TABLET, FILM COATED ORAL
Status: DISCONTINUED | OUTPATIENT
Start: 2018-06-16 | End: 2018-06-17 | Stop reason: HOSPADM

## 2018-06-16 RX ORDER — ASPIRIN 300 MG/1
300 SUPPOSITORY RECTAL SEE ADMIN INSTRUCTIONS
Status: DISCONTINUED | OUTPATIENT
Start: 2018-06-16 | End: 2018-06-16

## 2018-06-16 RX ORDER — ASPIRIN 300 MG/1
300 SUPPOSITORY RECTAL ONCE
Status: COMPLETED | OUTPATIENT
Start: 2018-06-16 | End: 2018-06-16

## 2018-06-16 RX ORDER — ASPIRIN 325 MG
325 TABLET ORAL ONCE
Status: COMPLETED | OUTPATIENT
Start: 2018-06-16 | End: 2018-06-16

## 2018-06-16 RX ADMIN — ATORVASTATIN CALCIUM 40 MG: 40 TABLET, FILM COATED ORAL at 20:49

## 2018-06-16 RX ADMIN — STANDARDIZED SENNA CONCENTRATE AND DOCUSATE SODIUM 2 TABLET: 8.6; 5 TABLET, FILM COATED ORAL at 09:01

## 2018-06-16 RX ADMIN — INSULIN LISPRO 2 UNITS: 100 INJECTION, SOLUTION INTRAVENOUS; SUBCUTANEOUS at 06:51

## 2018-06-16 RX ADMIN — INSULIN LISPRO 3 UNITS: 100 INJECTION, SOLUTION INTRAVENOUS; SUBCUTANEOUS at 03:01

## 2018-06-16 RX ADMIN — INSULIN LISPRO 3 UNITS: 100 INJECTION, SOLUTION INTRAVENOUS; SUBCUTANEOUS at 12:00

## 2018-06-16 RX ADMIN — INSULIN LISPRO 3 UNITS: 100 INJECTION, SOLUTION INTRAVENOUS; SUBCUTANEOUS at 23:43

## 2018-06-16 RX ADMIN — SODIUM CHLORIDE: 9 INJECTION, SOLUTION INTRAVENOUS at 01:28

## 2018-06-16 RX ADMIN — INSULIN LISPRO 5 UNITS: 100 INJECTION, SOLUTION INTRAVENOUS; SUBCUTANEOUS at 18:10

## 2018-06-16 RX ADMIN — STANDARDIZED SENNA CONCENTRATE AND DOCUSATE SODIUM 2 TABLET: 8.6; 5 TABLET, FILM COATED ORAL at 20:50

## 2018-06-16 RX ADMIN — ASPIRIN 81 MG: 81 TABLET, COATED ORAL at 09:01

## 2018-06-16 RX ADMIN — ENOXAPARIN SODIUM 40 MG: 100 INJECTION SUBCUTANEOUS at 09:02

## 2018-06-16 RX ADMIN — ASPIRIN 300 MG: 300 SUPPOSITORY RECTAL at 04:27

## 2018-06-16 ASSESSMENT — LIFESTYLE VARIABLES
EVER_SMOKED: NEVER
ALCOHOL_USE: NO
EVER_SMOKED: NEVER

## 2018-06-16 ASSESSMENT — PAIN SCALES - GENERAL
PAINLEVEL_OUTOF10: 0
PAINLEVEL_OUTOF10: 0

## 2018-06-16 ASSESSMENT — COGNITIVE AND FUNCTIONAL STATUS - GENERAL
MOBILITY SCORE: 24
MOBILITY SCORE: 24
SUGGESTED CMS G CODE MODIFIER MOBILITY: CH
SUGGESTED CMS G CODE MODIFIER MOBILITY: CH
DAILY ACTIVITIY SCORE: 24
SUGGESTED CMS G CODE MODIFIER DAILY ACTIVITY: CH

## 2018-06-16 ASSESSMENT — PATIENT HEALTH QUESTIONNAIRE - PHQ9
2. FEELING DOWN, DEPRESSED, IRRITABLE, OR HOPELESS: NOT AT ALL
SUM OF ALL RESPONSES TO PHQ9 QUESTIONS 1 AND 2: 0
1. LITTLE INTEREST OR PLEASURE IN DOING THINGS: NOT AT ALL
SUM OF ALL RESPONSES TO PHQ9 QUESTIONS 1 AND 2: 0
2. FEELING DOWN, DEPRESSED, IRRITABLE, OR HOPELESS: NOT AT ALL
1. LITTLE INTEREST OR PLEASURE IN DOING THINGS: NOT AT ALL

## 2018-06-16 ASSESSMENT — COPD QUESTIONNAIRES
COPD SCREENING SCORE: 0
HAVE YOU SMOKED AT LEAST 100 CIGARETTES IN YOUR ENTIRE LIFE: NO/DON'T KNOW
DURING THE PAST 4 WEEKS HOW MUCH DID YOU FEEL SHORT OF BREATH: NONE/LITTLE OF THE TIME
DO YOU EVER COUGH UP ANY MUCUS OR PHLEGM?: NO/ONLY WITH OCCASIONAL COLDS OR INFECTIONS

## 2018-06-16 ASSESSMENT — GAIT ASSESSMENTS
DISTANCE (FEET): 1000
GAIT LEVEL OF ASSIST: SUPERVISED

## 2018-06-16 NOTE — THERAPY
"53 y/o male adm for right sided facial droop.  Presents with intact motor and sensory components of BLE, intact balance with high level balance activities and intact coordination. No furthera acute PT services required at this time. However, an additional visit may be requested as per his attending provider to address DC or equipment needs.     Physical Therapy Evaluation completed.   Bed Mobility:  Supine to Sit: Independent  Transfers: Sit to Stand: Supervised  Gait: Level Of Assist: Supervised with No Equipment Needed       Plan of Care: Patient with no further skilled PT needs in the acute care setting at this time  Discharge Recommendations: Equipment: No Equipment Needed. Post-acute therapy Currently anticipate no further skilled physical therapy needs once patient is discharged from the inpatient setting.    See \"Rehab Therapy-Acute\" Patient Summary Report for complete documentation.     "

## 2018-06-16 NOTE — CARE PLAN
Problem: Communication  Goal: The ability to communicate needs accurately and effectively will improve  Outcome: PROGRESSING AS EXPECTED      Problem: Safety  Goal: Will remain free from injury  Outcome: PROGRESSING AS EXPECTED  informed pt to use call light when using BR  Goal: Will remain free from falls  Outcome: PROGRESSING AS EXPECTED

## 2018-06-16 NOTE — ED NOTES
Pt resting in stretcher. Resp even and unlabored. NAD. Fall precautions in place. Call bell in reach. Ongoing monitoring.

## 2018-06-16 NOTE — PROGRESS NOTES
Received pt from ED via [id number] accompanied by family. Pt is A/Ox4, able to ambulate transfer to bed with steady gait. Denies pain and discomfort. NIHSS assessment done with a score of 3. Pt has right facial droop, aphasia and dysarthria. Pt placed on NPO due to facial droop until speech eval. 2RN skin assessment done with Shaniqua. Skin is intact no skin issue noted. BS of 277 medicated per sliding scale 3units administered. Call light within pt reach.

## 2018-06-16 NOTE — H&P
[facility] MEDICINE HISTORY AND PHYSICAL     PATIENT ID:  NAME:  Israel Aviles  MRN:               [id number]  YOB: 1965    Date of Admission: 6/15/2018     Attending: Dr. Dang    Resident: Dr. Lira, PGY3    Primary Care Physician:  Dr. MIGUELINA Bai    CC:  Facial droop and numbness    HPI: Israel Aviles is a 52 y.o. male who presented the ED this evening complaining of R sided facial droop and numbness on the R side of his face. He first noticed these when he awoke this AM at 06:30. Along with this he states that his tongue feels numb and weak as well. He states that nothing like this has happened to him before. He denies any extremity weakness or numbness, slurred speech, recent illness, recent travel, HA, vision changes, chest pain/palpitations, shortness of breath, abdominal pain, nausea, vomiting, diarrhea, bowel/bladder incontinence or LE edema. Pt does have a PMHx significant for DMT2.     REVIEW OF SYSTEMS:   Ten systems reviewed and were negative except as noted in the HPI.                PAST MEDICAL HISTORY:  Past Medical History:   Diagnosis Date   • Diabetes (HCC)        PAST SURGICAL HISTORY:  History reviewed. No pertinent surgical history.    FAMILY HISTORY:  History reviewed. No pertinent family history.   Mother and father both w/ DMT2    SOCIAL HISTORY:   Pt lives in [place]  Pt is a lacto-ovo vegitarian  Smoking denies  Etoh use denies  Drug use denies    DIET:   No orders of the defined types were placed in this encounter.  NPO until dysphagia screen is done    ALLERGIES:  No Known Allergies    OUTPATIENT MEDICATIONS:    Current Facility-Administered Medications:   •  senna-docusate (PERICOLACE or SENOKOT S) 8.6-50 MG per tablet 2 Tab, 2 Tab, Oral, BID **AND** polyethylene glycol/lytes (MIRALAX) PACKET 1 Packet, 1 Packet, Oral, QDAY PRN **AND** magnesium hydroxide (MILK OF MAGNESIA) suspension 30 mL, 30 mL, Oral, QDAY PRN **AND** bisacodyl (DULCOLAX) suppository 10 mg,  10 mg, Rectal, QDAY PRN, Rabia Lira M.D.  •  Respiratory Care per Protocol, , Nebulization, Continuous RT, Rabia Lira M.D.  •  NS infusion, , Intravenous, Continuous, Rabia Lira M.D., Last Rate: 75 mL/hr at 18 0128  •  enoxaparin (LOVENOX) inj 40 mg, 40 mg, Subcutaneous, DAILY, Rabia Lira M.D.  •  enalaprilat (VASOTEC) injection 1.25 mg, 1.25 mg, Intravenous, Q6HRS PRN, Rabia Lira M.D.  •  ondansetron (ZOFRAN) syringe/vial injection 4 mg, 4 mg, Intravenous, Q4HRS PRN, Rabia Lira M.D.  •  ondansetron (ZOFRAN ODT) dispertab 4 mg, 4 mg, Oral, Q4HRS PRN, Rabia Lira M.D.  •  promethazine (PHENERGAN) tablet 12.5-25 mg, 12.5-25 mg, Oral, Q4HRS PRN, Rabia Lira M.D.  •  promethazine (PHENERGAN) suppository 12.5-25 mg, 12.5-25 mg, Rectal, Q4HRS PRN, Rabia Lira M.D.  •  prochlorperazine (COMPAZINE) injection 5-10 mg, 5-10 mg, Intravenous, Q4HRS PRN, Rabia Lira M.D.  •  acetaminophen (TYLENOL) tablet 650 mg, 650 mg, Oral, Q6HRS PRN, Rabia Lira M.D.  •  insulin lispro (HUMALOG) injection 1-6 Units, 1-6 Units, Subcutaneous, Q6HRS **AND** Accu-Chek Q6 if NPO, , , Q6H **AND** NOTIFY MD and PharmD, , , Once **AND** glucose 4 g chewable tablet 16 g, 16 g, Oral, Q15 MIN PRN **AND** dextrose 50% (D50W) injection 25 mL, 25 mL, Intravenous, Q15 MIN PRN, Rabia Lira M.D.  •  aspirin (ASA) tablet 325 mg, 325 mg, Oral, Once, Rabia Lira M.D.  •  aspirin EC (ECOTRIN) tablet 81 mg, 81 mg, Oral, DAILY, Rabia Lira M.D.  •  atorvastatin (LIPITOR) tablet 10 mg, 10 mg, Oral, QHS, Rabia Lira M.D.    PHYSICAL EXAM:  Vitals:    18 0000 18 0100 18 0115 18 0155   BP: 139/76 136/71  122/88   Pulse: 74 75  96   Resp: 18 17  20   Temp:    36.1 °C (96.9 °F)   TempSrc:       SpO2:  96%  96%   Weight:   83.5 kg (184 lb 1.4 oz) 83.5 kg (184 lb 1.4 oz)   Height:   1.829 m (6') 1.829 m (6')   , Temp (24hrs), Av.4 °C (97.5 °F), Min:36.1 °C (96.9 °F),  Max:36.7 °C (98 °F)  , Pulse Oximetry: 96 %, O2 (LPM): 0    General: Pt resting in NAD, cooperative   Skin:  Pink, warm and dry.  No rashes  HEENT: NC/AT. PERRL. EOMI. MMM. No nasal discharge.   Neck:  Supple without lymphadenopathy or rigidity. No JVD   Lungs:  Symmetrical.  CTAB with no W/R/R.  Good air movement   Cardiovascular:  S1/S2 RRR without M/R/G.  Abdomen:  Abdomen is soft NT/ND. +BS. No masses noted.  Extremities:  Full range of motion. No gross deformities noted. 2+ pulses in all extremities. No C/C/E.  Strength and sensation in tact in B/L upper and lower extremities   Spine:  Straight without vertebral anomalies.  CNS:  Muscle tone is normal.  A&Ox4. Facial sensation intact. Decreased ability to smile on L side, decreased nasal labial fold, other wise CN grossly intact. No slurred speech.          LAB TESTS:   Recent Labs      06/15/18   2220   WBC  8.1   RBC  5.07   HEMOGLOBIN  15.6   HEMATOCRIT  45.2   MCV  89.2   MCH  30.8   RDW  42.7   PLATELETCT  178   MPV  11.2   NEUTSPOLYS  79.90*   LYMPHOCYTES  18.00*   MONOCYTES  1.50   EOSINOPHILS  0.10   BASOPHILS  0.10     Recent Labs      06/15/18   2220   TROPONINI  <0.01     Recent Labs      06/15/18   2220   SODIUM  136   POTASSIUM  3.8   CHLORIDE  100   CO2  26   BUN  15   CREATININE  0.85   CALCIUM  9.7   ALBUMIN  4.3       CULTURES:   Results     ** No results found for the last 168 hours. **          IMAGES:  6/15 MRI Brain-  1.  Small ovoid focus of acute infarction involving the left posterior frontal periventricular white matter.  2.  Small acute focus of infarction in the left posterior frontal periventricular white matter.  3.  Mild periventricular and juxtacortical white matter changes consistent with chronic microvascular ischemic gliosis.  4.  Small chronic focus of lacunar type infarction in the right paramedian daysi    6/15 Maxillofacial CT-  1.  No facial fracture.  2.  Chronic maxillary sinus disease, more extensive on the RIGHT.  3.   No focal soft tissue inflammatory changes.  Limited by lack of IV contrast.    6/15 CT Head-  No acute intracranial abnormality.    CONSULTS:   Tele Neuro-Dr. Jung (recs appreciated)    ASSESSMENT/PLAN: 52 y.o. male admitted for CVA leading to R sided facial numbness and droop.    #CVA:  -MRI Brain- Small ovoid focus of acute infarction involving the left posterior frontal periventricular white matter. Small acute              focus of infarction in the left posterior frontal periventricular white matter. Mild periventricular and juxtacortical                              white matter changes consistent with chronic microvascular ischemic gliosis. Small chronic focus of lacunar type                     infarction in the right paramedian daysi.  -Tele Neuro consulted (Dr. Jung, recs appreciated)       -TSH, Lipid Panel, A1c, UDS ordered       -MRA Head/Neck       -Echo (TTE)       -Dysphagia screen              -if passed 325 mg ASA, if does not, 300 mg ASA DC tonight              -start 81 mg ASA tomorrow              -Atorvastatin 40 mg QHS         -Permissive HTN       -Also recommended in-person Neuro see Pt in AM  -PT/OT/ST consulted  -IVF while NPO    #DMT2:  -Will hold home Metformin and Glimepiride  -ISS  -NPO  -Hypoglycemia protocol  -A1c pending    Dispo: Admit to Neuro w/ tele monitoring  PCP: Richardson  PPx: Lovenox  IVF: NS @ 75 cc/hr  Abx: None  Code Status: Full

## 2018-06-16 NOTE — CONSULTS
Brief Acute Teleneurology Note    Discussed case w/ Dr. Lira. No video consult done as presented outside time window, no acute intervention to be done.    Subjective  53 y/o M w/ PMH DMII, woke up this morning with R facial droop @0630. LKN the night before (6/14). MRI brain shows an acute ischemic stroke L posterior frontal periventricular region.    Objective  VS: OU=863/94 on arrival, down to 125/88 without intervention    Imaging - personally visualized and radiology reports reviewed  CT Head w/o (6/15/18)  No acute intracranial abnormality.    MRI Brain w/o (6/15/18)  1.  Small ovoid focus of acute infarction involving the left posterior frontal periventricular white matter.  2.  Small acute focus of infarction in the left posterior frontal periventricular white matter.  3.  Mild periventricular and juxtacortical white matter changes consistent with chronic microvascular ischemic gliosis.  4.  Small chronic focus of lacunar type infarction in the right paramedian daysi.    Assessment/Recommendations:  53 y/o M w/ PMH DMII-non-insulin dependent (on oral agents) presents w/ acute onset R facial droop and numbness since waking up this morning (6/15) @ 0630. Due to LKN the night prior (6/14), patient is outside the time window for IV tPA or intervention.    MRI shows acute lacunar infarction L periventricular posterior frontal region. Also shows old small ischemic infarction in R paramedian daysi. Etiology likely due to small vessel disease.    Acute Work-Up/Management   - Bedside dysphagia screen/sip test   - Stat Aspirin 325mg PO x1 (if passes above), or 300mg FL x1 (if fails), and then ST to clear   - Permissive HTN up to 220/120 x48 hours after sx onset (through tomorrow allow to autoregulate), with PRN IV meds if above this   - MRA head/neck   - TTE w/ bubble   - Telemetry/continuous cardiac monitoring while in hospital   - HgBA1C, Lipids, TSH, UDS    Secondary Stroke Prevention   - Antiplatelet: Aspirin 81mg  PO daily if he was not on aspirin at home already, if he was on and compliant w/ Aspirin at home would add Plavix 75mg PO daily to Aspirin 81mg daily x3 weeks then stop Aspirin and continue monotherapy w/ Plavix alone (MRA results may change timeframe of this)   - Anticoagulation: no known indication at this time and by imaging stroke etiology c/w lacunar/small vessel disease   - HTN: permissive as above to 220/120 if needed, through tomorrow (6/16), then slowly lower to goal per JNC-8 guidelines   - HLD: check lipids, start high-intensity statin with Atorvastatin 40mg PO nightly or greater, or Rosuvastatin 20mg nightly or greater   - DM: check A1C, accuchecks/SSI while inpatient, goal euglycemia   - BMI=25.5, borderline overweight,  on weight loss, diet/nutrition, exercise   - Inquire about tobacco, alcohol, drugs    Other   - ST/PT/OT if needs   - Pharmacologic DVT ppx while inpatient with Heparin or Lovenox    Thank you for involving teleneurology in the care of your patient, Mr. Aviles. Please page with questions or concerns.    Elaina Jung MD  [facility] [employer]  Acute Teleneurology

## 2018-06-16 NOTE — PROGRESS NOTES
[facility] FAMILY MEDICINE PROGRESS NOTE     Attending:   Donny Mcnally MD    Resident:   Floyd Magana DO, MPH    PATIENT:   Israel Aviles; [id number]; 1965    ID:   51yo male w/ a significant pmhx of DMII, admitted for recent cerebrovascular accident.    SUBJECTIVE:   No acute events overnight, pt reportedly slept well throughout the PM. No worsening of CN exam. PT w/o complaints this AM. Pt desiring to eat. Has been on a clear liquid diet since last PM. Currently denies headaches, CP, SOB, ABD pain, N/V, and change in bowel and bladder habits.    OBJECTIVE:  Vitals:    06/16/18 0100 06/16/18 0115 06/16/18 0155 06/16/18 0400   BP: 136/71  122/88 157/95   Pulse: 75  96 97   Resp: 17  20 18   Temp:   36.1 °C (96.9 °F) 36.2 °C (97.2 °F)   TempSrc:       SpO2: 96%  96% 96%   Weight:  83.5 kg (184 lb 1.4 oz) 83.5 kg (184 lb 1.4 oz)    Height:  1.829 m (6') 1.829 m (6')      No intake or output data in the 24 hours ending 06/16/18 0517    PHYSICAL EXAM:  General: Pt resting in NAD, cooperative   Skin:  Pink, warm and dry.  No rashes  HEENT: NC/AT. PERRL. EOMI. MMM. No nasal discharge.   Neck:  Supple without lymphadenopathy or rigidity. No JVD   Lungs:  Symmetrical.  CTAB with no W/R/R.  Good air movement   Cardiovascular:  S1/S2 RRR without M/R/G.  Abdomen:  Abdomen is soft NT/ND. +BS. No masses noted.  Extremities: Full range of motion. No gross deformities noted. 2+ pulses in all extremities. No C/C/E.  Strength and sensation in tact in B/L upper and lower extremities   Spine:  Straight without vertebral anomalies.  CNS:  Muscle tone is normal. AAOx3. Facial sensation intact. Decreased ability to smile on R side reportedly improved, decreased nasal labial fold, other wise CN grossly intact. No slurred speech.        LABS:  Recent Labs      06/15/18   2220   WBC  8.1   RBC  5.07   HEMOGLOBIN  15.6   HEMATOCRIT  45.2   MCV  89.2   MCH  30.8   RDW  42.7   PLATELETCT  178   MPV  11.2   NEUTSPOLYS  79.90*   LYMPHOCYTES   18.00*   MONOCYTES  1.50   EOSINOPHILS  0.10   BASOPHILS  0.10     Recent Labs      06/15/18   2220   SODIUM  136   POTASSIUM  3.8   CHLORIDE  100   CO2  26   BUN  15   CREATININE  0.85   CALCIUM  9.7   ALBUMIN  4.3     Estimated GFR/CRCL = Estimated Creatinine Clearance: 111.6 mL/min (by C-G formula based on SCr of 0.85 mg/dL).  Recent Labs      06/15/18   2220  06/16/18   0143  06/16/18   0258   GLUCOSE  313*   --    --    POCGLUCOSE   --   285*  277*     Recent Labs      06/15/18   2220   ASTSGOT  23   ALTSGPT  29   TBILIRUBIN  0.6   ALKPHOSPHAT  72   GLOBULIN  4.7*   INR  1.01     Recent Labs      06/15/18   2220   TROPONINI  <0.01           Invalid input(s): [id number]  Recent Labs      06/15/18   2220   INR  1.01   APTT  29.1         IMAGING:  No new imaging      MEDS:  Current Facility-Administered Medications   Medication Last Dose   • senna-docusate (PERICOLACE or SENOKOT S) 8.6-50 MG per tablet 2 Tab Stopped at 06/15/18 2315    And   • polyethylene glycol/lytes (MIRALAX) PACKET 1 Packet      And   • magnesium hydroxide (MILK OF MAGNESIA) suspension 30 mL      And   • bisacodyl (DULCOLAX) suppository 10 mg     • Respiratory Care per Protocol     • NS infusion     • enoxaparin (LOVENOX) inj 40 mg     • enalaprilat (VASOTEC) injection 1.25 mg     • ondansetron (ZOFRAN) syringe/vial injection 4 mg     • ondansetron (ZOFRAN ODT) dispertab 4 mg     • promethazine (PHENERGAN) tablet 12.5-25 mg     • promethazine (PHENERGAN) suppository 12.5-25 mg     • prochlorperazine (COMPAZINE) injection 5-10 mg     • acetaminophen (TYLENOL) tablet 650 mg     • insulin lispro (HUMALOG) injection 1-6 Units 3 Units at 06/16/18 0301    And   • glucose 4 g chewable tablet 16 g      And   • dextrose 50% (D50W) injection 25 mL     • aspirin EC (ECOTRIN) tablet 81 mg     • atorvastatin (LIPITOR) tablet 10 mg Stopped at 06/15/18 2330       ASSESSMENT/PLAN:  Israel Aviles is a 53yo male admitted for CVA leading to R sided facial  numbness and droop.     #CVA  - MRI Brain- Small ovoid focus of acute infarction involving the left posterior frontal periventricular white matter. Small acute focus of infarction in the left posterior frontal periventricular white matter. Mild periventricular and juxtacortical white matter changes consistent with chronic microvascular ischemic gliosis. Small chronic focus of lacunar type infarction in the right paramedian daysi.  - Tele Neuro consulted (Dr. Jung, recs appreciated)  - TSH 1.78  - Lipid Panel w/ low HDL, otherwise nml  - A1c 9.8  - UDS Negative  - MRA Head/Neck  - PT/OT consulted     Plan:  - Echo (TTE) w/ bubble  - Dysphagia screen              - If passed 325 mg ASA, if does not, 300 mg ASA IA tonight              - Cont ASA 81mg       - Cont Atorvastatin 40 mg QHS    - Permissive HTN, will slowly lower goal to JNC-8 guidelines  - Neuro to see pt this AM  - F/u PT/OT  - IVF while NPO       #DMT2  - Takes Metformin and Glimepiride at home  - A1c 9.8    Plan:  - ISS  - NPO  - Hold Metformin and Glimepiride  - Hypoglycemia protocol  - A1c pending       Dispo: Admit to Neuro w/ tele monitoring, awaiting studies and further work-up  PCP: Richardson  PPx: Lovenox  IVF: NS @ 75 cc/hr  Abx: None  Code Status: Full      Floyd Magana DO, MPH (PGY-1)

## 2018-06-17 VITALS
RESPIRATION RATE: 16 BRPM | WEIGHT: 184.08 LBS | TEMPERATURE: 97.5 F | DIASTOLIC BLOOD PRESSURE: 95 MMHG | SYSTOLIC BLOOD PRESSURE: 148 MMHG | BODY MASS INDEX: 24.93 KG/M2 | HEART RATE: 86 BPM | HEIGHT: 72 IN | OXYGEN SATURATION: 98 %

## 2018-06-17 LAB
ALBUMIN SERPL BCP-MCNC: 3.7 G/DL (ref 3.2–4.9)
ALBUMIN/GLOB SERPL: 0.9 G/DL
ALP SERPL-CCNC: 61 U/L (ref 30–99)
ALT SERPL-CCNC: 24 U/L (ref 2–50)
ANION GAP SERPL CALC-SCNC: 8 MMOL/L (ref 0–11.9)
AST SERPL-CCNC: 17 U/L (ref 12–45)
BASOPHILS # BLD AUTO: 0.4 % (ref 0–1.8)
BASOPHILS # BLD: 0.02 K/UL (ref 0–0.12)
BILIRUB SERPL-MCNC: 0.7 MG/DL (ref 0.1–1.5)
BUN SERPL-MCNC: 18 MG/DL (ref 8–22)
CALCIUM SERPL-MCNC: 9.1 MG/DL (ref 8.5–10.5)
CHLORIDE SERPL-SCNC: 104 MMOL/L (ref 96–112)
CO2 SERPL-SCNC: 27 MMOL/L (ref 20–33)
CREAT SERPL-MCNC: 0.88 MG/DL (ref 0.5–1.4)
EOSINOPHIL # BLD AUTO: 0.06 K/UL (ref 0–0.51)
EOSINOPHIL NFR BLD: 1.1 % (ref 0–6.9)
ERYTHROCYTE [DISTWIDTH] IN BLOOD BY AUTOMATED COUNT: 44.5 FL (ref 35.9–50)
GLOBULIN SER CALC-MCNC: 3.9 G/DL (ref 1.9–3.5)
GLUCOSE BLD-MCNC: 239 MG/DL (ref 65–99)
GLUCOSE BLD-MCNC: 275 MG/DL (ref 65–99)
GLUCOSE BLD-MCNC: 293 MG/DL (ref 65–99)
GLUCOSE BLD-MCNC: 318 MG/DL (ref 65–99)
GLUCOSE SERPL-MCNC: 271 MG/DL (ref 65–99)
HCT VFR BLD AUTO: 44.3 % (ref 42–52)
HGB BLD-MCNC: 14.6 G/DL (ref 14–18)
IMM GRANULOCYTES # BLD AUTO: 0.01 K/UL (ref 0–0.11)
IMM GRANULOCYTES NFR BLD AUTO: 0.2 % (ref 0–0.9)
LV EJECT FRACT  99904: 60
LV EJECT FRACT MOD 2C 99903: 62.04
LV EJECT FRACT MOD 4C 99902: 52.06
LV EJECT FRACT MOD BP 99901: 57.82
LYMPHOCYTES # BLD AUTO: 2.55 K/UL (ref 1–4.8)
LYMPHOCYTES NFR BLD: 47 % (ref 22–41)
MCH RBC QN AUTO: 30 PG (ref 27–33)
MCHC RBC AUTO-ENTMCNC: 33 G/DL (ref 33.7–35.3)
MCV RBC AUTO: 91 FL (ref 81.4–97.8)
MONOCYTES # BLD AUTO: 0.45 K/UL (ref 0–0.85)
MONOCYTES NFR BLD AUTO: 8.3 % (ref 0–13.4)
NEUTROPHILS # BLD AUTO: 2.33 K/UL (ref 1.82–7.42)
NEUTROPHILS NFR BLD: 43 % (ref 44–72)
NRBC # BLD AUTO: 0 K/UL
NRBC BLD-RTO: 0 /100 WBC
PLATELET # BLD AUTO: 178 K/UL (ref 164–446)
PMV BLD AUTO: 11.4 FL (ref 9–12.9)
POTASSIUM SERPL-SCNC: 3.9 MMOL/L (ref 3.6–5.5)
PROT SERPL-MCNC: 7.6 G/DL (ref 6–8.2)
RBC # BLD AUTO: 4.87 M/UL (ref 4.7–6.1)
SODIUM SERPL-SCNC: 139 MMOL/L (ref 135–145)
WBC # BLD AUTO: 5.4 K/UL (ref 4.8–10.8)

## 2018-06-17 PROCEDURE — 36415 COLL VENOUS BLD VENIPUNCTURE: CPT

## 2018-06-17 PROCEDURE — 93306 TTE W/DOPPLER COMPLETE: CPT

## 2018-06-17 PROCEDURE — 93306 TTE W/DOPPLER COMPLETE: CPT | Mod: 26 | Performed by: INTERNAL MEDICINE

## 2018-06-17 PROCEDURE — 80053 COMPREHEN METABOLIC PANEL: CPT

## 2018-06-17 PROCEDURE — A9270 NON-COVERED ITEM OR SERVICE: HCPCS | Performed by: FAMILY MEDICINE

## 2018-06-17 PROCEDURE — 97165 OT EVAL LOW COMPLEX 30 MIN: CPT

## 2018-06-17 PROCEDURE — 82962 GLUCOSE BLOOD TEST: CPT

## 2018-06-17 PROCEDURE — G8989 SELF CARE D/C STATUS: HCPCS | Mod: CH

## 2018-06-17 PROCEDURE — 700111 HCHG RX REV CODE 636 W/ 250 OVERRIDE (IP): Performed by: FAMILY MEDICINE

## 2018-06-17 PROCEDURE — G8988 SELF CARE GOAL STATUS: HCPCS | Mod: CH

## 2018-06-17 PROCEDURE — 85025 COMPLETE CBC W/AUTO DIFF WBC: CPT

## 2018-06-17 PROCEDURE — G8987 SELF CARE CURRENT STATUS: HCPCS | Mod: CH

## 2018-06-17 PROCEDURE — 700102 HCHG RX REV CODE 250 W/ 637 OVERRIDE(OP): Performed by: FAMILY MEDICINE

## 2018-06-17 RX ORDER — ASPIRIN 81 MG/1
81 TABLET ORAL DAILY
Qty: 30 TAB | Refills: 3 | Status: ON HOLD | OUTPATIENT
Start: 2018-06-18 | End: 2020-01-10 | Stop reason: SDUPTHER

## 2018-06-17 RX ORDER — ATORVASTATIN CALCIUM 40 MG/1
40 TABLET, FILM COATED ORAL
Qty: 30 TAB | Refills: 3 | Status: SHIPPED | OUTPATIENT
Start: 2018-06-17 | End: 2018-06-17

## 2018-06-17 RX ORDER — ATORVASTATIN CALCIUM 40 MG/1
40 TABLET, FILM COATED ORAL
Qty: 30 TAB | Refills: 3 | Status: SHIPPED | OUTPATIENT
Start: 2018-06-17 | End: 2019-08-21

## 2018-06-17 RX ORDER — ASPIRIN 81 MG/1
81 TABLET ORAL DAILY
Qty: 30 TAB | Refills: 3 | Status: SHIPPED | OUTPATIENT
Start: 2018-06-18 | End: 2018-06-17

## 2018-06-17 RX ORDER — INSULIN GLARGINE 100 [IU]/ML
10 INJECTION, SOLUTION SUBCUTANEOUS
Status: DISCONTINUED | OUTPATIENT
Start: 2018-06-17 | End: 2018-06-17 | Stop reason: HOSPADM

## 2018-06-17 RX ADMIN — INSULIN LISPRO 4 UNITS: 100 INJECTION, SOLUTION INTRAVENOUS; SUBCUTANEOUS at 12:45

## 2018-06-17 RX ADMIN — ASPIRIN 81 MG: 81 TABLET, COATED ORAL at 08:15

## 2018-06-17 RX ADMIN — INSULIN LISPRO 3 UNITS: 100 INJECTION, SOLUTION INTRAVENOUS; SUBCUTANEOUS at 06:23

## 2018-06-17 RX ADMIN — INSULIN GLARGINE 10 UNITS: 100 INJECTION, SOLUTION SUBCUTANEOUS at 08:16

## 2018-06-17 RX ADMIN — ENOXAPARIN SODIUM 40 MG: 100 INJECTION SUBCUTANEOUS at 08:16

## 2018-06-17 RX ADMIN — STANDARDIZED SENNA CONCENTRATE AND DOCUSATE SODIUM 2 TABLET: 8.6; 5 TABLET, FILM COATED ORAL at 08:14

## 2018-06-17 ASSESSMENT — COGNITIVE AND FUNCTIONAL STATUS - GENERAL
SUGGESTED CMS G CODE MODIFIER DAILY ACTIVITY: CH
DAILY ACTIVITIY SCORE: 24

## 2018-06-17 ASSESSMENT — PATIENT HEALTH QUESTIONNAIRE - PHQ9
SUM OF ALL RESPONSES TO PHQ9 QUESTIONS 1 AND 2: 0
1. LITTLE INTEREST OR PLEASURE IN DOING THINGS: NOT AT ALL
2. FEELING DOWN, DEPRESSED, IRRITABLE, OR HOPELESS: NOT AT ALL

## 2018-06-17 ASSESSMENT — PAIN SCALES - GENERAL
PAINLEVEL_OUTOF10: 0
PAINLEVEL_OUTOF10: 0

## 2018-06-17 ASSESSMENT — ACTIVITIES OF DAILY LIVING (ADL): TOILETING: INDEPENDENT

## 2018-06-17 NOTE — PROGRESS NOTES
[facility] FAMILY MEDICINE PROGRESS NOTE     Attending: Dali    Resident: Prisca    PATIENT: Israel Aviles; [id number]; 1965    ID: 52 y.o. male admitted for CVA    SUBJECTIVE: No acute events overnight.  Patient reports feeling well and wants to go home.  Significantly elevated sugars overnight.    OBJECTIVE:     Vitals:    06/17/18 0000 06/17/18 0400 06/17/18 0800 06/17/18 1207   BP: 133/86 116/86 125/86 153/93   Pulse: 88 83 88 86   Resp: 20 16 18 16   Temp: 36.3 °C (97.4 °F) 36.4 °C (97.6 °F) 36.3 °C (97.3 °F) 35.8 °C (96.5 °F)   TempSrc:       SpO2: 99% 94% 98% 98%   Weight:       Height:           Intake/Output Summary (Last 24 hours) at 06/17/18 1436  Last data filed at 06/16/18 2000   Gross per 24 hour   Intake               50 ml   Output                0 ml   Net               50 ml       PE:  General: No acute distress, afebrile, resting comfortably  HEENT: NC/AT. EOMI. MMM, neck supple without adenopathy or mass  Cardiovascular: RRR, NMGR, cap refill brisk.  Respiratory: CTAB, no tachypnea or retractions  Abdomen: soft, NT/ND, no masses  EXT:  SILVA, no edema, no erythema/lesion   Neuro: decreased nasolabial fold on right    LABS:  Recent Labs      06/15/18   2220  06/17/18   0434   WBC  8.1  5.4   RBC  5.07  4.87   HEMOGLOBIN  15.6  14.6   HEMATOCRIT  45.2  44.3   MCV  89.2  91.0   MCH  30.8  30.0   RDW  42.7  44.5   PLATELETCT  178  178   MPV  11.2  11.4   NEUTSPOLYS  79.90*  43.00*   LYMPHOCYTES  18.00*  47.00*   MONOCYTES  1.50  8.30   EOSINOPHILS  0.10  1.10   BASOPHILS  0.10  0.40     Recent Labs      06/15/18   2220  06/17/18   0434   SODIUM  136  139   POTASSIUM  3.8  3.9   CHLORIDE  100  104   CO2  26  27   BUN  15  18   CREATININE  0.85  0.88   CALCIUM  9.7  9.1   ALBUMIN  4.3  3.7     Estimated GFR/CRCL = Estimated Creatinine Clearance: 107.8 mL/min (by C-G formula based on SCr of 0.88 mg/dL).  Recent Labs      06/15/18   2220   06/16/18   2338  06/17/18   0434  06/17/18   0617  06/17/18    1200   GLUCOSE  313*   --    --   271*   --    --    POCGLUCOSE   --    < >  293*   --   275*  318*    < > = values in this interval not displayed.     Recent Labs      06/15/18   2220  06/17/18   0434   ASTSGOT  23  17   ALTSGPT  29  24   TBILIRUBIN  0.6  0.7   ALKPHOSPHAT  72  61   GLOBULIN  4.7*  3.9*   INR  1.01   --      Recent Labs      06/15/18   2220   TROPONINI  <0.01           Invalid input(s): [id number]  Recent Labs      06/15/18   2220   INR  1.01   APTT  29.1       MEDS:  Current Facility-Administered Medications   Medication Last Dose   • insulin glargine (LANTUS) injection 10 Units 10 Units at 06/17/18 0816   • atorvastatin (LIPITOR) tablet 40 mg 40 mg at 06/16/18 2049   • senna-docusate (PERICOLACE or SENOKOT S) 8.6-50 MG per tablet 2 Tab 2 Tab at 06/17/18 0814    And   • polyethylene glycol/lytes (MIRALAX) PACKET 1 Packet      And   • magnesium hydroxide (MILK OF MAGNESIA) suspension 30 mL      And   • bisacodyl (DULCOLAX) suppository 10 mg     • Respiratory Care per Protocol     • enoxaparin (LOVENOX) inj 40 mg 40 mg at 06/17/18 0816   • enalaprilat (VASOTEC) injection 1.25 mg     • ondansetron (ZOFRAN) syringe/vial injection 4 mg     • ondansetron (ZOFRAN ODT) dispertab 4 mg     • promethazine (PHENERGAN) tablet 12.5-25 mg     • promethazine (PHENERGAN) suppository 12.5-25 mg     • prochlorperazine (COMPAZINE) injection 5-10 mg     • acetaminophen (TYLENOL) tablet 650 mg     • insulin lispro (HUMALOG) injection 1-6 Units 4 Units at 06/17/18 1245    And   • glucose 4 g chewable tablet 16 g      And   • dextrose 50% (D50W) injection 25 mL     • aspirin EC (ECOTRIN) tablet 81 mg 81 mg at 06/17/18 0815       ASSESSMENT/PLAN: Israel Aviles is a 51yo male admitted for CVA leading to R sided facial numbness and droop.     #CVA  - MRI Brain- Small ovoid focus of acute infarction involving the left posterior frontal periventricular white matter. Small acute focus of infarction in the left posterior  frontal periventricular white matter. Mild periventricular and juxtacortical white matter changes consistent with chronic microvascular ischemic gliosis. Small chronic focus of lacunar type infarction in the right paramedian daysi.  - Tele Neuro consulted (Dr. Jung, recs appreciated)  - TSH 1.78  - Lipid Panel w/ low HDL, otherwise nml  - A1c 9.8  - UDS Negative  - MRA Head/Neck  - PT/OT consulted      Plan:  -Follow up results of ECHO  -DC today with followup        #DMT2  - Takes Metformin and Glimepiride at home  - A1c 9.8     Plan:  -Start lantus 10 units today  -CLOSE FOLLOWUP        Dispo: Admit to Neuro w/ tele monitoring, awaiting studies and further work-up  PCP: Richardson  PPx: Lovenox  IVF: NS @ 75 cc/hr  Abx: None  Code Status: Full

## 2018-06-17 NOTE — DISCHARGE SUMMARY
[facility] DISCHARGE SUMMARY     PATIENT ID:    Name:             Israel Aviles     YOB: 1965  Age:                 52 y.o.  male   MRN:               [id number]  Address:         [address] [place] [address]  [place] [id number]  Phone:            [phone] (home)    ADMISSION DATE: 6/15/2018    DISCHARGE DATE: 6/17/2018    DISCHARGE DIAGNOSES:   Acute Ischemic CVA  Uncontrolled type 2 DM    ATTENDING PHYSICIAN: Dali    RESIDENT: Hand    CONSULTANTS:  Teleneurology    PROCEDURES:  None    IMAGING:   ECHOCARDIOGRAM  Normal left ventricular systolic function.  Mild concentric left ventricular hypertrophy.  No significant valve abnormalities.   No prior study is available for comparison.    MRI brain:  1.  Small ovoid focus of acute infarction involving the left posterior frontal periventricular white matter.  2.  Small acute focus of infarction in the left posterior frontal periventricular white matter.  3.  Mild periventricular and juxtacortical white matter changes consistent with chronic microvascular ischemic gliosis.  4.  Small chronic focus of lacunar type infarction in the right paramedian daysi.    LABS:  Recent Labs      06/15/18   2220  06/17/18   0434   WBC  8.1  5.4   RBC  5.07  4.87   HEMOGLOBIN  15.6  14.6   HEMATOCRIT  45.2  44.3   MCV  89.2  91.0   MCH  30.8  30.0   RDW  42.7  44.5   PLATELETCT  178  178   MPV  11.2  11.4   NEUTSPOLYS  79.90*  43.00*   LYMPHOCYTES  18.00*  47.00*   MONOCYTES  1.50  8.30   EOSINOPHILS  0.10  1.10   BASOPHILS  0.10  0.40     Recent Labs      06/15/18   2220  06/17/18   0434   SODIUM  136  139   POTASSIUM  3.8  3.9   CHLORIDE  100  104   CO2  26  27   GLUCOSE  313*  271*   BUN  15  18     Lab Results   Component Value Date/Time    CHOLSTRLTOT 262 (H) 06/15/2018 10:20 PM     (H) 06/15/2018 10:20 PM    HDL 54 06/15/2018 10:20 PM    TRIGLYCERIDE 132 06/15/2018 10:20 PM       Lab Results   Component Value Date/Time    TROPONINI <0.01  06/15/2018 10:20 PM       Lab Results   Component Value Date/Time    TROPONINI <0.01 06/15/2018 10:20 PM          HISTORY OF PRESENT ILLNESS:  Per Dr. Lira's H&P on day of admission:  52 y.o. male who presented the ED this evening complaining of R sided facial droop and numbness on the R side of his face. He first noticed these when he awoke this AM at 06:30. Along with this he states that his tongue feels numb and weak as well. He states that nothing like this has happened to him before. He denies any extremity weakness or numbness, slurred speech, recent illness, recent travel, HA, vision changes, chest pain/palpitations, shortness of breath, abdominal pain, nausea, vomiting, diarrhea, bowel/bladder incontinence or LE edema. Pt does have a PMHx significant for DMT2.     HOSPITAL COURSE:   Patient was admitted with the above history and diagnosed with an acute CVA based on physical exam and imaging findings discussed above.  Teleneurology was consulted and recommended medical management.  Patient was started on aspirin and atorvastatin and tolerated these medications well.  An ECHO and MRA of the head and neck were within normal limits.      Of note, during the hospitalization, patient was noted to have wildly uncontrolled diabetes.  His A1c was 12.3 on admission and throughout hospitalization, his sugars were in the 300s.  On his final day of hospitalization, he was started on 10U of lantus, but there was question about whether patient would be compliant with the injectable medication and diabetes education was not available on that late afternoon.  With that, patient was discharged on his home regimen of metformin and glimepiride, but was specifically instructed to follow up with [facility] this week for medication management related to his diabetes.  He would likely benefit from Victoza or Jardiance for cardiovascuar risk reduction.  Ultimately, he may need insulin therapy.    DISCHARGE CONDITION:   Stable    DISPOSITION: Home    DISCHARGE MEDICATIONS:    Israel Aviles   Home Medication Instructions KAROLINE:[id number]    Printed on:06/17/18 3935   Medication Information                      aspirin 81 MG EC tablet  Take 1 Tab by mouth every day.             atorvastatin (LIPITOR) 40 MG Tab  Take 1 Tab by mouth every bedtime.             glimepiride (AMARYL) 4 MG Tab  Take 4 mg by mouth every morning.             metFORMIN ER (GLUCOPHAGE XR) 500 MG TABLET SR 24 HR  Take 500 mg by mouth every day.                  ACTIVITY:  Normal Activity as Tolerated.    DIET: Healthy    DISCHARGE INSTRUCTIONS AND FOLLOW UP:  Patient is medically stable for discharge and will be discharged to home    Follow Up: [facility] this week    Discharge Instructions:   Patient was instructed to return the ER in the event of worsening symptoms including but not limited to chest pain, shortness of breath, dizziness or any other major concerns. Patient understands that failure to do so may indicate worsening of his/her medical condition(s) and result in adverse clinical outcomes including fatality. We have counseled the patient on the importance of compliance and the patient has agreed to proceed with all medical recommendations and follow up plan indicated above. The patient understands that the failure to do so may result in result in adverse clinical outcomes including fatality.     CC:    Mj Bai M.D.

## 2018-06-17 NOTE — PROGRESS NOTES
Patient exhibits extensive education deficits regarding his diabetes management. I spent an hour with him and his son last night. I just spoke to him some more with his breakfast. We spoke about the difference between Lantus and his Humalog. We also spoke about diet.

## 2018-06-17 NOTE — PROGRESS NOTES
Pt A&Ox4, denies pain. Pt states numbness in toes, has R facial droop. Pt up SBA, gait steady. Pt educated on plan of care, all questions answered at this time. Call light and personal belongings within reach, hourly rounding in place.

## 2018-06-17 NOTE — THERAPY
"Occupational Therapy Evaluation completed.   Functional Status: Independent basic ADL, supv transfers without AD  Plan of Care: Patient with no further skilled OT needs in the acute care setting at this time  Discharge Recommendations:  Equipment: No Equipment Needed. Post-acute therapy: Currently anticipate no further skilled therapy needs once patient is discharged from the inpatient setting.    See \"Rehab Therapy-Acute\" Patient Summary Report for complete documentation.    52 y.o. male who presented with R facial droop and R facial numbness. Dx with L acute lacunar infarct. Pt seen for OT eval. Completed: supine > EOB, transfers and amb in room without AD, LB dressing. Declined toileting and oral care due to already completed. Pt presents with intact BUE strength/sensation/coordination, intact balance, intact basic cognition, no vision changes. Educated pt on signs & symptoms of stroke, increased risk with current dx. Pt verbalizes understanding. No further acute or post-acute OT needs at this time.     "

## 2018-06-17 NOTE — CARE PLAN
Problem: Communication  Goal: The ability to communicate needs accurately and effectively will improve  Outcome: PROGRESSING AS EXPECTED  Pt. Alert and oriented x 4 at this time and able to make needs known to the nursing staff.     Problem: Pain Management  Goal: Pain level will decrease to patient's comfort goal  Outcome: PROGRESSING AS EXPECTED  Pt. denies any complaints of pain and resting in bed comfortably at this time.

## 2018-06-17 NOTE — PROGRESS NOTES
Pt's blood sugars in the upper 200's overnight. Dr. Cope at the bedside and updated. Orders for 10 units of Lantus AM daily. First dose included now. Pending verification by pharmacy.    Abdominal Pain, N/V/D

## 2018-06-18 NOTE — PROGRESS NOTES
"Patient discharged with son. Discharge instructions discussed thoroughly with both. We discussed the importance of him following up with [facility]- tomorrow in order to better control his DM. I also printed off DMII material from [employer] and gave it to patient. It included \"Understanding carbs\" and \"Healthy Meals\".  Called Dr. Cope prior to DC to verify and ask about POC.  "

## 2018-06-18 NOTE — DISCHARGE INSTRUCTIONS
Patient will not discharge home on insulin.  Patient needs to call [facility] TOMORROW and get a followup appointment.  He needs better diabetes control and will need additional medications started as an outpatient.    Diabetes Mellitus and Food  It is important for you to manage your blood sugar (glucose) level. Your blood glucose level can be greatly affected by what you eat. Eating healthier foods in the appropriate amounts throughout the day at about the same time each day will help you control your blood glucose level. It can also help slow or prevent worsening of your diabetes mellitus. Healthy eating may even help you improve the level of your blood pressure and reach or maintain a healthy weight.  General recommendations for healthful eating and cooking habits include:  · Eating meals and snacks regularly. Avoid going long periods of time without eating to lose weight.  · Eating a diet that consists mainly of plant-based foods, such as fruits, vegetables, nuts, legumes, and whole grains.  · Using low-heat cooking methods, such as baking, instead of high-heat cooking methods, such as deep frying.  Work with your dietitian to make sure you understand how to use the Nutrition Facts information on food labels.  How can food affect me?  Carbohydrates   Carbohydrates affect your blood glucose level more than any other type of food. Your dietitian will help you determine how many carbohydrates to eat at each meal and teach you how to count carbohydrates. Counting carbohydrates is important to keep your blood glucose at a healthy level, especially if you are using insulin or taking certain medicines for diabetes mellitus.  Alcohol   Alcohol can cause sudden decreases in blood glucose (hypoglycemia), especially if you use insulin or take certain medicines for diabetes mellitus. Hypoglycemia can be a life-threatening condition. Symptoms of hypoglycemia (sleepiness, dizziness, and disorientation) are similar to symptoms of  having too much alcohol.  If your health care provider has given you approval to drink alcohol, do so in moderation and use the following guidelines:  · Women should not have more than one drink per day, and men should not have more than two drinks per day. One drink is equal to:  ¨ 12 oz of beer.  ¨ 5 oz of wine.  ¨ 1½ oz of hard liquor.  · Do not drink on an empty stomach.  · Keep yourself hydrated. Have water, diet soda, or unsweetened iced tea.  · Regular soda, juice, and other mixers might contain a lot of carbohydrates and should be counted.  What foods are not recommended?  As you make food choices, it is important to remember that all foods are not the same. Some foods have fewer nutrients per serving than other foods, even though they might have the same number of calories or carbohydrates. It is difficult to get your body what it needs when you eat foods with fewer nutrients. Examples of foods that you should avoid that are high in calories and carbohydrates but low in nutrients include:  · Trans fats (most processed foods list trans fats on the Nutrition Facts label).  · Regular soda.  · Juice.  · Candy.  · Sweets, such as cake, pie, doughnuts, and cookies.  · Fried foods.  What foods can I eat?  Eat nutrient-rich foods, which will nourish your body and keep you healthy. The food you should eat also will depend on several factors, including:  · The calories you need.  · The medicines you take.  · Your weight.  · Your blood glucose level.  · Your blood pressure level.  · Your cholesterol level.  You should eat a variety of foods, including:  · Protein.  ¨ Lean cuts of meat.  ¨ Proteins low in saturated fats, such as fish, egg whites, and beans. Avoid processed meats.  · Fruits and vegetables.  ¨ Fruits and vegetables that may help control blood glucose levels, such as apples, mangoes, and yams.  · Dairy products.  ¨ Choose fat-free or low-fat dairy products, such as milk, yogurt, and cheese.  · Grains,  bread, pasta, and rice.  ¨ Choose whole grain products, such as multigrain bread, whole oats, and brown rice. These foods may help control blood pressure.  · Fats.  ¨ Foods containing healthful fats, such as nuts, avocado, olive oil, canola oil, and fish.  Does everyone with diabetes mellitus have the same meal plan?  Because every person with diabetes mellitus is different, there is not one meal plan that works for everyone. It is very important that you meet with a dietitian who will help you create a meal plan that is just right for you.  This information is not intended to replace advice given to you by your health care provider. Make sure you discuss any questions you have with your health care provider.  Document Released: 09/14/2006 Document Revised: 05/25/2017 Document Reviewed: 11/14/2014  [employer] Interactive Patient Education © 2017 [employer]  Discharge Instructions    Discharged to home by car with relative. Discharged via walking, hospital escort: Refused.  Special equipment needed: Not Applicable    Be sure to schedule a follow-up appointment with your primary care doctor or any specialists as instructed.     Discharge Plan:   Diet Plan: Discussed  Activity Level: Discussed  Confirmed Follow up Appointment: Appointment Scheduled  Confirmed Symptoms Management: Discussed  Medication Reconciliation Updated: Yes  Influenza Vaccine Indication: Patient Refuses    I understand that a diet low in cholesterol, fat, and sodium is recommended for good health. Unless I have been given specific instructions below for another diet, I accept this instruction as my diet prescription.   Other diet: Diabetic    Special Instructions:     Stroke/CVA/TIA/Hemorrhagic Ischemia Discharge Instructions  You have had a stroke. Your risk factors have been identified as follows:  High blood pressure  It is important that you reduce your risk factors to avoid another stroke in the future. Here are some general guidelines to  follow:  · Eat healthy - avoid food high in fat.  · Get regular exercise.  · Maintain a healthy weight.  · Avoid smoking.  · Avoid alcohol and illegal drug use.  · Take your medications as directed.  For more information regarding risk factors, refer to pages 17-19 in your Stroke Patient Education Guide. Stroke Education Guide was given to patient.    Warning signs of a stroke include (which can also be found on page 3 of your Stroke Patient Education Guide):  · Sudden numbness of weakness of the face, arm or leg (especially on one side of the body).  · Sudden confusion, trouble speaking or understanding.  · Sudden trouble seeing in one or both eyes.  · Sudden trouble walking, dizziness, loss of balance or coordination.  · Sudden severe headache with no known cause.  It is very important to get treatment quickly when a stroke occurs. If you experience any of the above warning signs, call [phone] immediately.     Some patients who have had a stroke will be going home on a blood thinner medication called Warfarin (Coumadin).  This medication requires very close monitoring and follow up.  This follow up can be provided by either your Primary Care Physician or by [facility]'s Outpatient Anticoagulation Service.  The Outpatient Anticoagulation Service is located at the [facility] at [facility] ([facility]).  If you do not know when your follow up appointment is scheduled, call [phone] to verify your appointment time.      · Is patient discharged on Warfarin / Coumadin?   No     Depression / Suicide Risk    As you are discharged from this [facility], it is important to learn how to keep safe from harming yourself.    Recognize the warning signs:  · Abrupt changes in personality, positive or negative- including increase in energy   · Giving away possessions  · Change in eating patterns- significant weight changes-  positive or negative  · Change in sleeping  patterns- unable to sleep or sleeping all the time   · Unwillingness or inability to communicate  · Depression  · Unusual sadness, discouragement and loneliness  · Talk of wanting to die  · Neglect of personal appearance   · Rebelliousness- reckless behavior  · Withdrawal from people/activities they love  · Confusion- inability to concentrate     If you or a loved one observes any of these behaviors or has concerns about self-harm, here's what you can do:  · Talk about it- your feelings and reasons for harming yourself  · Remove any means that you might use to hurt yourself (examples: pills, rope, extension cords, firearm)  · Get professional help from the community (Mental Health, Substance Abuse, psychological counseling)  · Do not be alone:Call your Safe Contact- someone whom you trust who will be there for you.  · Call your local CRISIS HOTLINE 602-0077 or 694-669-2192  · Call your local Children's Mobile Crisis Response Team [place] (954) 375-6664 or www.[employer]illa  · Call the toll free National Suicide Prevention Hotlines   · National Suicide Prevention Lifeline 184-404-GBTC (2529)  · National Hope Line Network 800-SUICIDE (017-4137)

## 2019-02-21 ENCOUNTER — OFFICE VISIT (OUTPATIENT)
Dept: URGENT CARE | Facility: CLINIC | Age: 54
End: 2019-02-21
Payer: COMMERCIAL

## 2019-02-21 ENCOUNTER — HOSPITAL ENCOUNTER (EMERGENCY)
Facility: MEDICAL CENTER | Age: 54
End: 2019-02-21
Attending: EMERGENCY MEDICINE
Payer: COMMERCIAL

## 2019-02-21 VITALS
HEIGHT: 72 IN | TEMPERATURE: 96.8 F | BODY MASS INDEX: 24.22 KG/M2 | RESPIRATION RATE: 16 BRPM | OXYGEN SATURATION: 100 % | DIASTOLIC BLOOD PRESSURE: 91 MMHG | WEIGHT: 178.79 LBS | HEART RATE: 84 BPM | SYSTOLIC BLOOD PRESSURE: 136 MMHG

## 2019-02-21 VITALS
BODY MASS INDEX: 24.92 KG/M2 | OXYGEN SATURATION: 98 % | HEART RATE: 80 BPM | WEIGHT: 184 LBS | HEIGHT: 72 IN | TEMPERATURE: 98.3 F | RESPIRATION RATE: 16 BRPM | DIASTOLIC BLOOD PRESSURE: 84 MMHG | SYSTOLIC BLOOD PRESSURE: 128 MMHG

## 2019-02-21 DIAGNOSIS — H43.12 VITREOUS HEMORRHAGE OF LEFT EYE (HCC): ICD-10-CM

## 2019-02-21 DIAGNOSIS — H53.9 VISION CHANGES: ICD-10-CM

## 2019-02-21 DIAGNOSIS — E11.319 TYPE 2 DIABETES MELLITUS WITH RETINOPATHY OF LEFT EYE, WITHOUT LONG-TERM CURRENT USE OF INSULIN, MACULAR EDEMA PRESENCE UNSPECIFIED, UNSPECIFIED RETINOPATHY SEVERITY (HCC): ICD-10-CM

## 2019-02-21 PROCEDURE — 99213 OFFICE O/P EST LOW 20 MIN: CPT | Performed by: NURSE PRACTITIONER

## 2019-02-21 PROCEDURE — 99283 EMERGENCY DEPT VISIT LOW MDM: CPT

## 2019-02-21 ASSESSMENT — ENCOUNTER SYMPTOMS
TINGLING: 0
SHORTNESS OF BREATH: 0
EYE PAIN: 0
WEAKNESS: 0
EYE REDNESS: 0
CHILLS: 0
DIZZINESS: 0
PHOTOPHOBIA: 0
EYE DISCHARGE: 0
DOUBLE VISION: 0
BLURRED VISION: 1
PALPITATIONS: 0
HEADACHES: 0
SENSORY CHANGE: 0
FEVER: 0

## 2019-02-21 ASSESSMENT — PAIN SCALES - WONG BAKER: WONGBAKER_NUMERICALRESPONSE: DOESN'T HURT AT ALL

## 2019-02-21 NOTE — ED TRIAGE NOTES
"[facility]  Chief Complaint   Patient presents with   • Visual Problems     Pt ambulatory to triage with above complaint. Pt states at approx 1230 yesterday, while driving, pt had visual changes in LEFT eye. Pt states, \"it's like a brown color. It looks like if you were to put color oil in water and move it around.\" Pt denies headache. Pt strong and equal strength in upper extremities. Pt currently taking metformin and glimepiride daily.  PMH: DM    /85   Pulse 90   Temp 36 °C (96.8 °F) (Temporal)   Resp 16   Ht 1.829 m (6')   Wt 81.1 kg (178 lb 12.7 oz)   SpO2 100%   BMI 24.25 kg/m²     Pt informed of triage process and encouraged to notify staff of any changes or concerns. Pt verbalized understanding of instructions. Apologized for long wait time. Pt placed back in lobby.     "

## 2019-02-22 NOTE — ED PROVIDER NOTES
ED Provider Note    Scribed for Nirali Cheema M.D. by Ty Jj. 2/21/2019, 4:40 PM.    Primary Care Provider: Mj Bai M.D.  Means of arrival: Walk in  History obtained from: Patient  History limited by: None    CHIEF COMPLAINT  Chief Complaint   Patient presents with   • Visual Problems       HPI  Israel Aviles is a 53 y.o. male who presents to the Emergency Department complaining of visual changes in his left eye since yesterday. Patient was driving to the store when he saw a dark brown film glazed over his vision. He took a nap later in the day, and the cloudiness had worsened. Then today when he woke up the cloudiness was still present, however a bit diminished. He reports being able to see objects, but they are all blurry. The film then sometimes begins to sink down when he stares at one object. He was seen earlier at , and they advised him to come to the ED. He denies his left eye being in any pain, nor nausea, vomiting, or having a headache. His right eye can see as normal. Israel has a history of diabetes, which he controls with metformin.      REVIEW OF SYSTEMS  Pertinent positives include left eye vision blurriness. Pertinent negatives include no left eye pain, nausea, vomiting, headache.      PAST MEDICAL HISTORY   has a past medical history of Diabetes (HCC).    SOCIAL HISTORY  Social History   Substance Use Topics   • Smoking status: Never Smoker   • Smokeless tobacco: Never Used   • Alcohol use No      History   Drug Use No       SURGICAL HISTORY  patient denies any surgical history     CURRENT MEDICATIONS  Home Medications     Reviewed by Alyssia Bullock R.N. (Registered Nurse) on 02/21/19 at 1531  Med List Status: Partial   Medication Last Dose Status   aspirin 81 MG EC tablet  Active   atorvastatin (LIPITOR) 40 MG Tab  Active   glimepiride (AMARYL) 4 MG Tab 2/21/2019 Active   metFORMIN ER (GLUCOPHAGE XR) 500 MG TABLET SR 24 HR 2/21/2019 Active                 ALLERGIES  No Known Allergies    PHYSICAL EXAM  VITAL SIGNS: /85   Pulse 90   Temp 36 °C (96.8 °F) (Temporal)   Resp 16   Ht 1.829 m (6')   Wt 81.1 kg (178 lb 12.7 oz)   SpO2 100%   BMI 24.25 kg/m²   Constitutional: Alert in no apparent distress. Well appearing pleasant gentleman.  HENT: Normocephalic, Atraumatic, Bilateral external ears normal. Nose normal.   Eyes:  Conjunctiva normal, pupils responsive. Able to visualize blood vessels on fundoscopic exam in right eye. Posterior chamber of left eye appears dark and cloudy and is difficult to visualize blood vessels.   Skin: Warm, Dry, No erythema, No rash.   Neurologic: Alert, Grossly non-focal.   Psychiatric: Affect normal, Judgment normal, Mood normal, Appears appropriate and not intoxicated.       COURSE & MEDICAL DECISION MAKING  Pertinent Labs & Imaging studies reviewed. (See chart for details)    4:40 PM - Patient seen and examined at bedside.     5:01 PM - Paged opthalmology.    5:08 PM - Spoke with Dr. Waterman, opthalmology, who suggests he is experiencing a vitreal hemorrhage, which can happen with people in diabetes. He does not recommend obtaining any imaging at this time, and would like to see Israel tomorrow at his clinic. He advises that he sleep at an incline tonight.    5:14 PM - I informed the patient of Dr. Waterman's recommendations, and patient agrees to follow up with him tomorrow morning.        The patient will return for new or worsening symptoms and is stable at the time of discharge. Patient was given return precautions. Patient and/or family member verbalizes understanding and will comply.    DISPOSITION:  Patient will be discharged home in stable condition.    FOLLOW UP:  Pedro Waterman M.D.  [address]  [place] [address]  [phone]      Go tomorrow for evaluation of your vision.    [facility], Emergency Dept  [address] [id number] [phone]    Return for  worsening symptoms, headache or other concerns      OUTPATIENT MEDICATIONS:  None    FINAL IMPRESSION  1. Vitreous hemorrhage of left eye (HCC)        This dictation has been created using voice recognition software and/or scribes. The accuracy of the dictation is limited by the abilities of the software and the expertise of the scribes. I expect there may be some errors of grammar and possibly content. I made every attempt to manually correct the errors within my dictation. However, errors related to voice recognition software and/or scribes may still exist and should be interpreted within the appropriate context.     ITy (Scribe), am scribing for, and in the presence of, Nirali Cheema M.D..    Electronically signed by: Ty Jj (Scribe), 2/21/2019    INirali M.D. personally performed the services described in this documentation, as scribed by Ty Jj in my presence, and it is both accurate and complete. E.    The note accurately reflects work and decisions made by me.  Nirali Cheema  2/21/2019  6:33 PM

## 2019-02-22 NOTE — ED NOTES
Eye acuity completed on patient wearing his reading glasses  Right Eye:20/20  Left Eye: per pt unable to see

## 2019-02-22 NOTE — PROGRESS NOTES
"Subjective:      Israel Aviles is a 53 y.o. male who presents with Eye Problem (x1 day, cant see out of (L) eye, can see a \"brown glass\", when moving eye pt stated he can blood moving. also say floaters. )            HPI  C/o vision change in left eye that started yesterday. States will see decreased visual field with \"brown tinted looking glass\" when looking at objects. H/o Type 2 diabetes. No yearly ophthalmology visits. Wears prescriptions glasses but has not had any recent vision exams. Denies HA, dizziness, pain behind the eyeball or confusion. H/o ischemic episode on his right side of head in 6/2018. Denies HTN, CP/pressure, weakness.     PMH:  has a past medical history of Diabetes (HCC).  MEDS:   Current Outpatient Prescriptions:   •  atorvastatin (LIPITOR) 40 MG Tab, Take 1 Tab by mouth every bedtime., Disp: 30 Tab, Rfl: 3  •  metFORMIN ER (GLUCOPHAGE XR) 500 MG TABLET SR 24 HR, Take 500 mg by mouth every day., Disp: , Rfl:   •  glimepiride (AMARYL) 4 MG Tab, Take 4 mg by mouth every morning., Disp: , Rfl:   •  aspirin 81 MG EC tablet, Take 1 Tab by mouth every day., Disp: 30 Tab, Rfl: 3  ALLERGIES: No Known Allergies  SURGHX: History reviewed. No pertinent surgical history.  SOCHX:  reports that he has never smoked. He has never used smokeless tobacco. He reports that he does not drink alcohol or use drugs.  FH: Family history was reviewed, no pertinent findings to report    Review of Systems   Constitutional: Negative for chills, fever and malaise/fatigue.   Eyes: Positive for blurred vision. Negative for double vision, photophobia, pain, discharge and redness.   Respiratory: Negative for shortness of breath.    Cardiovascular: Negative for chest pain and palpitations.   Neurological: Negative for dizziness, tingling, sensory change, weakness and headaches.   All other systems reviewed and are negative.         Objective:     /84   Pulse 80   Temp 36.8 °C (98.3 °F) (Temporal)   Resp 16   Ht " 1.829 m (6')   Wt 83.5 kg (184 lb)   SpO2 98%   BMI 24.95 kg/m²      Physical Exam   Constitutional: He is oriented to person, place, and time. Vital signs are normal. He appears well-developed and well-nourished. He is active and cooperative.  Non-toxic appearance. He does not have a sickly appearance. He does not appear ill. No distress.   HENT:   Head: Normocephalic.   Eyes: Pupils are equal, round, and reactive to light. Conjunctivae, EOM and lids are normal. Left eye exhibits no chemosis, no discharge, no exudate and no hordeolum. No foreign body present in the left eye. Left conjunctiva is not injected. Left conjunctiva has no hemorrhage. No scleral icterus.   Neck: Normal range of motion. Neck supple.   Cardiovascular: Normal rate.    Pulmonary/Chest: Effort normal.   Musculoskeletal: Normal range of motion.   Neurological: He is alert and oriented to person, place, and time. He has normal strength. No cranial nerve deficit or sensory deficit. Coordination and gait normal. GCS eye subscore is 4. GCS verbal subscore is 5. GCS motor subscore is 6.   Skin: Skin is warm and dry. He is not diaphoretic.   Psychiatric: He has a normal mood and affect. His speech is normal and behavior is normal. Judgment and thought content normal. He is not actively hallucinating. Cognition and memory are normal. He is attentive.   Vitals reviewed.              Assessment/Plan:     1. Vision changes    - REFERRAL TO OPHTHALMOLOGY    2. 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

## 2019-05-06 ENCOUNTER — OFFICE VISIT (OUTPATIENT)
Dept: URGENT CARE | Facility: CLINIC | Age: 54
End: 2019-05-06
Payer: COMMERCIAL

## 2019-05-06 VITALS
RESPIRATION RATE: 16 BRPM | HEIGHT: 72 IN | WEIGHT: 184 LBS | DIASTOLIC BLOOD PRESSURE: 78 MMHG | OXYGEN SATURATION: 97 % | HEART RATE: 87 BPM | TEMPERATURE: 96.9 F | SYSTOLIC BLOOD PRESSURE: 118 MMHG | BODY MASS INDEX: 24.92 KG/M2

## 2019-05-06 DIAGNOSIS — L03.116 CELLULITIS OF LEFT LOWER EXTREMITY: ICD-10-CM

## 2019-05-06 DIAGNOSIS — E11.621 TYPE 2 DIABETES MELLITUS WITH FOOT ULCER, WITHOUT LONG-TERM CURRENT USE OF INSULIN (HCC): ICD-10-CM

## 2019-05-06 DIAGNOSIS — L97.509 TYPE 2 DIABETES MELLITUS WITH FOOT ULCER, WITHOUT LONG-TERM CURRENT USE OF INSULIN (HCC): ICD-10-CM

## 2019-05-06 DIAGNOSIS — L97.519 ULCER OF RIGHT FOOT, UNSPECIFIED ULCER STAGE (HCC): ICD-10-CM

## 2019-05-06 PROCEDURE — 99214 OFFICE O/P EST MOD 30 MIN: CPT | Performed by: PHYSICIAN ASSISTANT

## 2019-05-06 RX ORDER — SULFAMETHOXAZOLE AND TRIMETHOPRIM 800; 160 MG/1; MG/1
1 TABLET ORAL 2 TIMES DAILY
Qty: 20 TAB | Refills: 0 | Status: SHIPPED | OUTPATIENT
Start: 2019-05-06 | End: 2019-05-16

## 2019-05-06 RX ORDER — SULFAMETHOXAZOLE AND TRIMETHOPRIM 800; 160 MG/1; MG/1
1 TABLET ORAL 2 TIMES DAILY
Qty: 20 TAB | Refills: 0 | Status: SHIPPED | OUTPATIENT
Start: 2019-05-06 | End: 2019-05-06

## 2019-05-06 ASSESSMENT — ENCOUNTER SYMPTOMS
FEVER: 0
NAUSEA: 0
CHILLS: 0

## 2019-05-07 NOTE — PROGRESS NOTES
Subjective:   Israel Aviles is a 53 y.o. male who presents for Foot Problem (foot swelling (Left) x3 days )        This is a new problem.  Patient who is a type II diabetic complains of left foot redness, swelling, and pain X 3 days.  Patient states that he has some ulcers on his foot that are slowly healing.  He states that he contacted his primary care today due to his concerns and was instructed to come to urgent care.  He denies nausea, vomiting, fevers, chills.  He states that fasting blood glucose is typically been in the high 160s to low 170s.  He is taking metformin and glipizide.  He denies history of kidney problems and difficulty ambulating.      Review of Systems   Constitutional: Negative for chills and fever.   Gastrointestinal: Negative for nausea.   Musculoskeletal: Negative for joint pain.       PMH:  has a past medical history of Diabetes ([facility]).  MEDS:   Current Outpatient Prescriptions:   •  sulfamethoxazole-trimethoprim (BACTRIM DS) 800-160 MG tablet, Take 1 Tab by mouth 2 times a day for 10 days., Disp: 20 Tab, Rfl: 0  •  aspirin 81 MG EC tablet, Take 1 Tab by mouth every day., Disp: 30 Tab, Rfl: 3  •  atorvastatin (LIPITOR) 40 MG Tab, Take 1 Tab by mouth every bedtime., Disp: 30 Tab, Rfl: 3  •  metFORMIN ER (GLUCOPHAGE XR) 500 MG TABLET SR 24 HR, Take 500 mg by mouth every day., Disp: , Rfl:   •  glimepiride (AMARYL) 4 MG Tab, Take 4 mg by mouth every morning., Disp: , Rfl:   ALLERGIES: No Known Allergies  SURGHX: History reviewed. No pertinent surgical history.  SOCHX:  reports that he has never smoked. He has never used smokeless tobacco. He reports that he does not drink alcohol or use drugs.  FH: Family history was reviewed, no pertinent findings to report   Objective:   /78 (BP Location: Right arm, Patient Position: Sitting)   Pulse 87   Temp 36.1 °C (96.9 °F) (Temporal)   Resp 16   Ht 1.829 m (6')   Wt 83.5 kg (184 lb)   SpO2 97%   BMI 24.95 kg/m²   Physical Exam    Constitutional: He appears well-developed and well-nourished.  Non-toxic appearance. No distress.   HENT:   Head: Normocephalic and atraumatic.   Right Ear: External ear normal.   Left Ear: External ear normal.   Nose: Nose normal.   Neck: Neck supple.   Cardiovascular: Normal rate, regular rhythm and intact distal pulses.    Pulses:       Dorsalis pedis pulses are 1+ on the left side.        Posterior tibial pulses are 1+ on the left side.   Pulmonary/Chest: Effort normal. No respiratory distress.   Musculoskeletal:        Feet:    There is moderate erythema and mild edema that extends up to the foot just distal to the ankle.  Neurovascularly intact distally.     Neurological: He is alert.   Skin: Skin is warm and dry. Capillary refill takes less than 2 seconds.   Psychiatric: He has a normal mood and affect. His speech is normal and behavior is normal. Judgment and thought content normal. Cognition and memory are normal.   Vitals reviewed.        Assessment/Plan:   1. Cellulitis of left lower extremity  - sulfamethoxazole-trimethoprim (BACTRIM DS) 800-160 MG tablet; Take 1 Tab by mouth 2 times a day for 10 days.  Dispense: 20 Tab; Refill: 0    2. Ulcer of right foot, unspecified ulcer stage ([facility])  - REFERRAL TO WOUND CLINIC  - sulfamethoxazole-trimethoprim (BACTRIM DS) 800-160 MG tablet; Take 1 Tab by mouth 2 times a day for 10 days.  Dispense: 20 Tab; Refill: 0    3. Type 2 diabetes mellitus with foot ulcer, without long-term current use of insulin ([facility])    Pt instructed to complete full course of medication despite symptomatic improvement. Pt to take med with meals to alleviate GI upset. Pt to take a probiotic or eat Katlyn’s yogurt/ kefir while taking the medication.    Patient to follow-up with endocrinologist regarding elevated fasting blood glucose.    Follow-up with PCP.  Patient referred to the wound care clinic for further evaluation.    Patient to monitor for signs of worsening infection. Signs of  infection, to include: redness, swelling, increased pain, purulent discharge, red streaking from wound site, N/V, and F/C discussed with patient.  Pt instructed to RTC or go to the ER if he or she should experience these.    Differential diagnosis, natural history, supportive care, and indications for immediate follow-up discussed.

## 2019-05-09 ENCOUNTER — NON-PROVIDER VISIT (OUTPATIENT)
Dept: WOUND CARE | Facility: MEDICAL CENTER | Age: 54
End: 2019-05-09
Attending: PHYSICIAN ASSISTANT
Payer: COMMERCIAL

## 2019-05-09 DIAGNOSIS — E11.621 TYPE 2 DIABETES MELLITUS WITH FOOT ULCER, WITHOUT LONG-TERM CURRENT USE OF INSULIN (HCC): ICD-10-CM

## 2019-05-09 DIAGNOSIS — L97.509 TYPE 2 DIABETES MELLITUS WITH FOOT ULCER, WITHOUT LONG-TERM CURRENT USE OF INSULIN (HCC): ICD-10-CM

## 2019-05-09 PROCEDURE — 97602 WOUND(S) CARE NON-SELECTIVE: CPT

## 2019-05-09 PROCEDURE — 99211 OFF/OP EST MAY X REQ PHY/QHP: CPT

## 2019-05-09 RX ORDER — GLIPIZIDE 5 MG/1
5 TABLET ORAL 2 TIMES DAILY
Status: ON HOLD | COMMUNITY
End: 2019-12-27

## 2019-05-09 NOTE — CERTIFICATION
Non Provider Encounter- Diabetic Foot Ulcer      HISTORY OF PRESENT ILLNESS    START OF CARE IN CLINIC: 05/09/2019    REFERRING PROVIDER: Urgent care referred; will send evaluation to PCP - Mj Bai MD      WOUND- Diabetic foot ulcer   LOCATION: Left dorsomedial foot distal             Left dorsomedial foot proximal    WOUND HISTORY: Patient states the wounds began around 5/3/19 because he was wearing shoes that were too tight. He reports that he has some numbness in the medial foot. He has been cleaning the wound daily and leaving it open to air. He presents today in flip flops and states that he does not, nor has ever worn diabetic shoes or inserts.       PERTINENT PMH: DMII       IMAGING: NA   VASCULAR STUDIES: None on file. Ordered bilateral arterial studies 5/9/19      LAST  WOUND CULTURE DATE : NA                OFFLOADING: No    Most recent A1c:  7.3 per patient and wife report DATE January 2019    TOBACCO USE: Denies    RESULTS: NA    CLINIC ALYSSA:   ALYSSA - 05/09/2019 Left    DP Unable to obtain due to wound location   PT 80   Brachial 114   Total ALYSSA 1.43       FALL RISK ASSESSMENT-    65 years or older     Fall within the last 2 years   Uses ambulatory devices  xLoss of protective sensation in feet   Use of prostethic/orthotic    Presence of lower extremity/foot/toe amputation   Taking medication that increases risk (per facility policy)    PAST MEDICAL HISTORY:   Past Medical History:   Diagnosis Date   • Diabetes (HCC)      PAST SURGICAL HISTORY: No past surgical history on file.     MEDICATIONS:   Current Outpatient Prescriptions   Medication   • sulfamethoxazole-trimethoprim (BACTRIM DS) 800-160 MG tablet   • aspirin 81 MG EC tablet   • atorvastatin (LIPITOR) 40 MG Tab   • metFORMIN ER (GLUCOPHAGE XR) 500 MG TABLET SR 24 HR   • glimepiride (AMARYL) 4 MG Tab     No current facility-administered medications for this visit.      ALLERGIES:  No Known Allergies    SOCIAL HISTORY:   Social History      Social History   • Marital status:      Spouse name: N/A   • Number of children: N/A   • Years of education: N/A     Social History Main Topics   • Smoking status: Never Smoker   • Smokeless tobacco: Never Used   • Alcohol use No   • Drug use: No   • Sexual activity: Not on file     Other Topics Concern   • Not on file     Social History Narrative   • No narrative on file     FAMILY HISTORY: No family history on file.    WOUND ASSESSMENT-    Wound 05/09/19 Left dorsomedial foot - distal (Active)   Wound Image   5/9/2019 10:00 AM   Site Assessment Brown;Dry 5/9/2019 10:00 AM   Angelica-wound Assessment Red;Callused 5/9/2019 10:00 AM   Margins Unattached edges 5/9/2019 10:00 AM   Post Wound Length (cm) 5.3 cm 5/9/2019 10:00 AM    Post Wound Width (cm) 2.8 cm 5/9/2019 10:00 AM   Post Wound Surface Area (cm^2) 14.84 cm^2 5/9/2019 10:00 AM   Drainage Amount None 5/9/2019 10:00 AM   Non-staged Wound Description Full thickness 5/9/2019 10:00 AM   Treatments Cleansed 5/9/2019 10:00 AM   Cleansing Normal Saline Irrigation 5/9/2019 10:00 AM   Dressing Cleansing/Solutions 3% Betadine 5/9/2019 10:00 AM   Dressing Changed New 5/9/2019 10:00 AM   Dressing Status Clean;Dry;Intact 5/9/2019 10:00 AM   Dressing Change Frequency Daily 5/9/2019 10:00 AM   WOUND NURSE ONLY - Odor None 5/9/2019 10:00 AM   WOUND NURSE ONLY - Pulses 1+;PT 5/9/2019 10:00 AM   WOUND NURSE ONLY - Tissue Type and Percentage 100% dry brown with lifting callus around edges 5/9/2019 10:00 AM       Wound 05/09/19 Left dorsomedial foot - proximal (Active)   Wound Image   5/9/2019 10:00 AM   Site Assessment Brown;Dry 5/9/2019 10:00 AM   Angelica-wound Assessment Red;Callused 5/9/2019 10:00 AM   Margins Defined edges 5/9/2019 10:00 AM   Post Wound Length (cm) 2 cm 5/9/2019 10:00 AM    Post Wound Width (cm) 1.5 cm 5/9/2019 10:00 AM   Post Wound Surface Area (cm^2) 3 cm^2 5/9/2019 10:00 AM   Tunneling 0 cm 5/9/2019 10:00 AM   Undermining 0 cm 5/9/2019 10:00 AM    Drainage Amount None 5/9/2019 10:00 AM   Non-staged Wound Description Full thickness 5/9/2019 10:00 AM   Treatments Cleansed 5/9/2019 10:00 AM   Cleansing Normal Saline Irrigation 5/9/2019 10:00 AM   Dressing Cleansing/Solutions 3% Betadine 5/9/2019 10:00 AM   Dressing Changed Changed 5/9/2019 10:00 AM   Dressing Status Clean;Dry;Intact 5/9/2019 10:00 AM   Dressing Change Frequency Daily 5/9/2019 10:00 AM   WOUND NURSE ONLY - Odor None 5/9/2019 10:00 AM   WOUND NURSE ONLY - Tissue Type and Percentage 100% dry brown 5/9/2019 10:00 AM     Pre-debridement photo              Procedures:     Debridement: Non selective with NS and gauze     Cleansed with: NS                                                                         Periwound protected with: NA   Primary dressing: Painted with betadine and allowed to dry   Secondary Dressing: Patient own flip flops       PATIENT EDUCATION  - Importance of tight glucose control for wound healing   - Implications of loss of protective sensation (LOPS) discussed with patient- including increased risk for amputation.  - Advised to check feet at least daily, moisturize feet, and to always wear protective foot wear.   -  Importance of offloading foot to assist with wound healing  - Advised pt not to trim nails or calluses, seek foot/nail care from podiatrist or certified foot/nail nurse  - Importance of adequate nutrition for wound healing  - Increase protein intake (unless contraindicated by renal status)

## 2019-05-09 NOTE — PATIENT INSTRUCTIONS
Cleanse wound with normal saline bullet (pink bullet); pat dry with gauze; wet gauze pad with betadine and paint all around and on the wound beds. Allow to air dry.     Should you experience any significant changes in your wound(s) such as infection (redness, swelling, localized heat, increased pain, fever >101 F, chills) or have any questions regarding your home care instructions, please contact the wound center [phone]. If after hours, contact your primary care physician or go the hospital emergency room.

## 2019-05-14 ENCOUNTER — OFFICE VISIT (OUTPATIENT)
Dept: WOUND CARE | Facility: MEDICAL CENTER | Age: 54
End: 2019-05-14
Attending: PHYSICIAN ASSISTANT
Payer: COMMERCIAL

## 2019-05-14 VITALS
RESPIRATION RATE: 20 BRPM | OXYGEN SATURATION: 99 % | DIASTOLIC BLOOD PRESSURE: 81 MMHG | TEMPERATURE: 97.9 F | SYSTOLIC BLOOD PRESSURE: 123 MMHG | HEART RATE: 93 BPM

## 2019-05-14 DIAGNOSIS — L97.428 DIABETIC ULCER OF LEFT MIDFOOT ASSOCIATED WITH TYPE 2 DIABETES MELLITUS, WITH OTHER ULCER SEVERITY (HCC): ICD-10-CM

## 2019-05-14 DIAGNOSIS — T14.8XXA WOUND INFECTION: ICD-10-CM

## 2019-05-14 DIAGNOSIS — R09.89 DECREASED PULSES IN FEET: ICD-10-CM

## 2019-05-14 DIAGNOSIS — E11.621 DIABETIC ULCER OF LEFT MIDFOOT ASSOCIATED WITH TYPE 2 DIABETES MELLITUS, WITH OTHER ULCER SEVERITY (HCC): ICD-10-CM

## 2019-05-14 DIAGNOSIS — L08.9 WOUND INFECTION: ICD-10-CM

## 2019-05-14 DIAGNOSIS — E11.42 DIABETIC POLYNEUROPATHY ASSOCIATED WITH TYPE 2 DIABETES MELLITUS (HCC): ICD-10-CM

## 2019-05-14 DIAGNOSIS — E11.65 UNCONTROLLED TYPE 2 DIABETES MELLITUS WITH HYPERGLYCEMIA (HCC): ICD-10-CM

## 2019-05-14 PROBLEM — E78.5 HYPERLIPIDEMIA: Status: ACTIVE | Noted: 2019-05-14

## 2019-05-14 PROCEDURE — 99212 OFFICE O/P EST SF 10 MIN: CPT

## 2019-05-14 PROCEDURE — 99213 OFFICE O/P EST LOW 20 MIN: CPT | Performed by: NURSE PRACTITIONER

## 2019-05-14 RX ORDER — AMOXICILLIN AND CLAVULANATE POTASSIUM 875; 125 MG/1; MG/1
1 TABLET, FILM COATED ORAL 2 TIMES DAILY
Qty: 20 TAB | Refills: 0 | Status: SHIPPED | OUTPATIENT
Start: 2019-05-14 | End: 2019-05-24

## 2019-05-14 ASSESSMENT — ENCOUNTER SYMPTOMS
VOMITING: 0
FEVER: 0
CLAUDICATION: 1
COUGH: 0
CONSTIPATION: 0
NAUSEA: 0
DIARRHEA: 0
CHILLS: 0
NERVOUS/ANXIOUS: 0
DEPRESSION: 0

## 2019-05-14 ASSESSMENT — PAIN SCALES - GENERAL: PAINLEVEL: NO PAIN

## 2019-05-14 NOTE — PROGRESS NOTES
Patient provided script for offloading shoe to [facility] that he and his son state they will go over either today or tomorrow to obtain. Also, gave patient the phone number to schedule his arterial studies as soon as possible.

## 2019-05-14 NOTE — PROGRESS NOTES
Provider Encounter- Diabetic Foot Ulcer      HISTORY OF PRESENT ILLNESS               START OF CARE IN CLINIC: 2019               REFERRING PROVIDER: Urgent care referred; will send evaluation to PCP - Mj Bai MD                  WOUND- Diabetic foot ulcer              LOCATION: Left dorsomedial foot distal                                   Left dorsomedial foot proximal               WOUND HISTORY: Patient states the wounds began around 5/3/19 because he was wearing leather shoes that were too tight. He reports that he has some numbness in the medial foot.  He treated the wounds himself at first, cleansing soap and water and then leaving them open to air.   His foot became more swollen and painful, so he presented to an urgent care on .  He was prescribed Bactrim DS and referred to the wound clinic.                 PERTINENT PMH: DMII                   IMAGING: NA              VASCULAR STUDIES: None on file. Ordered bilateral arterial studies 19                              LAST  WOUND CULTURE DATE : NA                            OFFLOADIN/14-Rx for offloading shoe provided in clinic         DIABETES HX: Diagnosed with type 2 diabetes 15 years ago, and is currently managing with oral medication.  Checks blood sugars 1-2 times per day and reports that these typically run around 1 70-1 80.  Has not had previous diabetes education.  He does have some numbness in his feet.  Usually wears slippers or regular shoes. Does not check his feet routinely.  Has not had previous foot ulcers or foot surgery.  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        TOBACCO USE: He is never used tobacco products      : Initial provider assessment.  Patient presents today wearing slippers.  He is accompanied by younger male family member.  Arterial studies have not yet been completed.  Left pedal pulses monophasic by Doppler.  Wounds were not debrided in clinic today, will await arterial  "study results.  Patient is convinced that his wounds are in a state of healing, states he has had similar wounds in the past to his left lower leg which went on to heal.              RESULTS:   Most recent A1c:   9.3%    DATE: 5/6/2019 ([facility])            PAST MEDICAL HISTORY:   Past Medical History:   Diagnosis Date   • Diabetes (HCC)      PAST SURGICAL HISTORY: History reviewed. No pertinent surgical history.     MEDICATIONS:   Current Outpatient Prescriptions   Medication   • glipiZIDE (GLUCOTROL) 5 MG Tab   • sulfamethoxazole-trimethoprim (BACTRIM DS) 800-160 MG tablet   • aspirin 81 MG EC tablet   • atorvastatin (LIPITOR) 40 MG Tab   • metFORMIN ER (GLUCOPHAGE XR) 500 MG TABLET SR 24 HR     No current facility-administered medications for this visit.        ALLERGIES:  No Known Allergies      SOCIAL HISTORY:   Social History     Social History   • Marital status:      Spouse name: N/A   • Number of children: N/A   • Years of education: N/A     Social History Main Topics   • Smoking status: Never Smoker   • Smokeless tobacco: Never Used   • Alcohol use No   • Drug use: No   • Sexual activity: Not on file     Other Topics Concern   • Not on file     Social History Narrative   • No narrative on file       FAMILY HISTORY: History reviewed. No pertinent family history.     REVIEW OF SYSTEMS:   Review of Systems   Constitutional: Negative for chills and fever.   Respiratory: Negative for cough.    Cardiovascular: Positive for claudication and leg swelling. Negative for chest pain.        Reports pain in calf with ambulation, \"sometimes\"  Left lower leg has been swollen since wounds developed   Gastrointestinal: Negative for constipation, diarrhea, nausea and vomiting.   Genitourinary: Negative for dysuria.   Musculoskeletal: Negative for joint pain.   Skin: Negative for rash.        Wounds to left dorsal foot since early May 2019, started as large blisters.   Neurological:        Some numbness in both " feet   Psychiatric/Behavioral: Negative for depression. The patient is not nervous/anxious.        PHYSICAL EXAMINATION:   /81   Pulse 93   Temp 36.6 °C (97.9 °F)   Resp 20   SpO2 99%     Physical Exam   Constitutional: He is oriented to person, place, and time and well-developed, well-nourished, and in no distress.   HENT:   Head: Normocephalic.   Eyes: Pupils are equal, round, and reactive to light.   Cardiovascular:   Unable to palpate pedal pulses in left foot  DP and PT pulses monophasic with Doppler   Pulmonary/Chest: Effort normal.   Musculoskeletal:   Nonpitting edema of left foot   Neurological: He is alert and oriented to person, place, and time.   Both feet insensate to light touch   Skin: Skin is warm.   Necrotic wounds x2 to left dorsal/medial foot  Periwound erythema radiating 10 to 15 cm from wounds  Refer to wound flowsheet and photos   Psychiatric: Mood, memory, affect and judgment normal.       WOUND ASSESSMENT   Wound 05/09/19 Left dorsomedial foot - distal (Active)   Wound Image   5/14/2019  9:40 AM   Site Assessment Brown;Dry 5/14/2019  9:40 AM   Angelica-wound Assessment Red;Callused 5/14/2019  9:40 AM   Margins Unattached edges 5/14/2019  9:40 AM   Wound Length (cm) 5 cm 5/14/2019  9:40 AM   Wound Width (cm) 2.9 cm 5/14/2019  9:40 AM   Wound Surface Area (cm^2) 14.5 cm^2 5/14/2019  9:40 AM   Post Wound Length (cm) 5.3 cm 5/9/2019 10:00 AM    Post Wound Width (cm) 2.8 cm 5/9/2019 10:00 AM   Post Wound Surface Area (cm^2) 14.84 cm^2 5/9/2019 10:00 AM   Drainage Amount None 5/14/2019  9:40 AM   Non-staged Wound Description Full thickness 5/14/2019  9:40 AM   Treatments Cleansed 5/14/2019  9:40 AM   Cleansing Normal Saline Irrigation 5/14/2019  9:40 AM   Dressing Options Roll Gauze 5/14/2019  9:40 AM   Dressing Cleansing/Solutions 3% Betadine 5/14/2019  9:40 AM   Dressing Changed Changed 5/14/2019  9:40 AM   Dressing Status Clean;Dry;Intact 5/14/2019  9:40 AM   Dressing Change Frequency  Daily 5/14/2019  9:40 AM   WOUND NURSE ONLY - Odor None 5/14/2019  9:40 AM   WOUND NURSE ONLY - Pulses 1+ 5/14/2019  9:40 AM   WOUND NURSE ONLY - Tissue Type and Percentage 100% dry brown with lifting callus around edges. 5/14/2019  9:40 AM       Wound 05/09/19 Left dorsomedial foot - proximal (Active)   Wound Image   5/14/2019  9:40 AM   Site Assessment Brown;Dry 5/14/2019  9:40 AM   Angelica-wound Assessment Red;Callused 5/14/2019  9:40 AM   Margins Defined edges 5/14/2019  9:40 AM   Wound Length (cm) 1.9 cm 5/14/2019  9:40 AM   Wound Width (cm) 1.5 cm 5/14/2019  9:40 AM   Wound Surface Area (cm^2) 2.85 cm^2 5/14/2019  9:40 AM   Post Wound Length (cm) 2 cm 5/9/2019 10:00 AM    Post Wound Width (cm) 1.5 cm 5/9/2019 10:00 AM   Post Wound Surface Area (cm^2) 3 cm^2 5/9/2019 10:00 AM   Tunneling 0 cm 5/9/2019 10:00 AM   Undermining 0 cm 5/9/2019 10:00 AM   Drainage Amount None 5/14/2019  9:40 AM   Non-staged Wound Description Full thickness 5/14/2019  9:40 AM   Treatments Cleansed 5/14/2019  9:40 AM   Cleansing Normal Saline Irrigation 5/14/2019  9:40 AM   Dressing Cleansing/Solutions 3% Betadine 5/14/2019  9:40 AM   Dressing Changed Changed 5/14/2019  9:40 AM   Dressing Status Clean;Dry;Intact 5/14/2019  9:40 AM   Dressing Change Frequency Daily 5/14/2019  9:40 AM   WOUND NURSE ONLY - Odor None 5/14/2019  9:40 AM   WOUND NURSE ONLY - Tissue Type and Percentage 100% dry brown 5/14/2019  9:40 AM          Wound photos  Left dorsal medial foot, proximal wound.  No debridement today    Left dorsal medial foot wound, distal wound.  No debridement today        PATIENT EDUCATION  - Importance of tight glucose control for wound healing   - Implications of loss of protective sensation (LOPS) discussed with patient- including increased risk for amputation.  - Advised to check feet at least daily, moisturize feet, and to always wear protective foot wear.   -  Importance of offloading foot to assist with wound healing  - Advised pt  not to trim nails or calluses, seek foot/nail care from podiatrist or certified foot/nail nurse  - Importance of adequate nutrition for wound healing  - Increase protein intake (unless contraindicated by renal status)          ASSESSMENT AND PLAN:     1. Diabetic ulcer of left midfoot associated with type 2 diabetes mellitus, with other ulcer severity (HCC)  Comment: Necrotic wounds x2 to dorsum of left foot.  Present since early May 2019, started as blisters.    5/14: Initial provider assessment  -Wounds were not debrided in clinic today due to uncertainty of patient's lower extremity perfusion.  -Patient to return to clinic in 1 week.  Excisional debridement will be done if lower extremity perfusion is adequate.  -He is to get his arterial studies done before next clinic visit.  Advised to call and schedule ASAP.  -He is to paint his wounds with Betadine daily and keep protected.  -Rx for offloading shoe provided.  Patient advised to wear at all times when ambulating.   Wound care: Wounds painted with Betadine, covered with gauze    2. Uncontrolled type 2 diabetes mellitus with hyperglycemia ([facility])  Comments: Most recent A1c 9.3%.    -Adverse effects of hyperglycemia on wound healing and general health discussed with patient  -He is encouraged to keep his blood sugars below 150 in order to optimize chances for wound healing.    3. Decreased pulses in feet  Comments: Arterial studies were ordered on patient's initial clinic visit, 5/9/2019 5/14: Patient has not yet been scheduled for arterial studies  -Advised that he call to schedule studies ASAP  -Left DP and PT pulses monophasic by Doppler      4. Diabetic polyneuropathy associated with type 2 diabetes mellitus ([facility])    5/14: Both feet insensate to light touch  - Implications of loss of protective sensation (LOPS) discussed with patient- including increased risk for amputation.  Advised to check feet at least daily, moisturize feet, and to always wear  protective foot wear.     5. Wound infection  5/14: Erythema radiating 10 to 15 cm from wounds.  Patient has 1 more day left of Bactrim DS  -Rx for Augmentin sent to patient's pharmacy        Please note that this dictation was created using voice recognition software. I have worked with technical experts from [facility] to optimize the interface.  I have made every reasonable attempt to correct obvious errors, but there may be errors of grammar and possibly content that I did not discover before finalizing the note.

## 2019-05-17 ENCOUNTER — OFFICE VISIT (OUTPATIENT)
Dept: URGENT CARE | Facility: CLINIC | Age: 54
End: 2019-05-17
Payer: COMMERCIAL

## 2019-05-17 ENCOUNTER — APPOINTMENT (OUTPATIENT)
Dept: RADIOLOGY | Facility: IMAGING CENTER | Age: 54
End: 2019-05-17
Attending: FAMILY MEDICINE
Payer: COMMERCIAL

## 2019-05-17 VITALS
DIASTOLIC BLOOD PRESSURE: 72 MMHG | OXYGEN SATURATION: 99 % | BODY MASS INDEX: 24.92 KG/M2 | HEART RATE: 92 BPM | RESPIRATION RATE: 16 BRPM | TEMPERATURE: 98.1 F | WEIGHT: 184 LBS | SYSTOLIC BLOOD PRESSURE: 120 MMHG | HEIGHT: 72 IN

## 2019-05-17 DIAGNOSIS — L08.9 FOOT INFECTION: ICD-10-CM

## 2019-05-17 DIAGNOSIS — L97.529 ULCER OF LEFT FOOT, UNSPECIFIED ULCER STAGE (HCC): ICD-10-CM

## 2019-05-17 PROCEDURE — 73630 X-RAY EXAM OF FOOT: CPT | Mod: TC,LT | Performed by: FAMILY MEDICINE

## 2019-05-17 PROCEDURE — 99214 OFFICE O/P EST MOD 30 MIN: CPT | Performed by: FAMILY MEDICINE

## 2019-05-17 NOTE — PROGRESS NOTES
Subjective:      Israel Aviles is a 53 y.o. male who presents with Foot Problem (x2 weeks, (L) foot wound infection)      - This is a pleasant and non toxic appearing 53 y.o. male with ongoing Lt foot ulcer/cellulitis. Has been seeing wound clinic and is currently on Augmentin. Foot has gotten more swollen and discolored in past day and he says called wound clinic and they told him to come to the walk-in-clinic. No [facility]              ALLERGIES:  Patient has no known allergies.     PMH:  Past Medical History:   Diagnosis Date   • Diabetes (HCC)         PSH:  History reviewed. No pertinent surgical history.    MEDS:    Current Outpatient Prescriptions:   •  amoxicillin-clavulanate (AUGMENTIN) 875-125 MG Tab, Take 1 Tab by mouth 2 times a day for 10 days., Disp: 20 Tab, Rfl: 0  •  glipiZIDE (GLUCOTROL) 5 MG Tab, Take 5 mg by mouth 2 times a day., Disp: , Rfl:   •  atorvastatin (LIPITOR) 40 MG Tab, Take 1 Tab by mouth every bedtime., Disp: 30 Tab, Rfl: 3  •  metFORMIN ER (GLUCOPHAGE XR) 500 MG TABLET SR 24 HR, Take 500 mg by mouth every day., Disp: , Rfl:   •  aspirin 81 MG EC tablet, Take 1 Tab by mouth every day., Disp: 30 Tab, Rfl: 3    ** I have documented what I find to be significant in regards to past medical, social, family and surgical history  in my HPI or under PMH/PSH/FH review section, otherwise it is contributory **         HPI    Review of Systems   All other systems reviewed and are negative.         Objective:     /72   Pulse 92   Temp 36.7 °C (98.1 °F) (Temporal)   Resp 16   Ht 1.829 m (6')   Wt 83.5 kg (184 lb)   SpO2 99%   BMI 24.95 kg/m²      Physical Exam   Constitutional: He appears well-developed. No distress.   HENT:   Head: Normocephalic and atraumatic.   Mouth/Throat: Oropharynx is clear and moist.   Eyes: Conjunctivae are normal.   Neck: Neck supple.   Cardiovascular: Regular rhythm.    No murmur heard.  Pulmonary/Chest: Effort normal. No respiratory distress.   Neurological: He  is alert. He exhibits normal muscle tone.   Skin: Skin is warm and dry.   Psychiatric: He has a normal mood and affect. Judgment normal.   Nursing note and vitals reviewed.    LLE: + ulcers as described per wound clinic w/ surrounding erythema/edema and what looks like maybe a deeper soft tissue abscess forming inferior to the distal/medial foot ulcer.               Assessment/Plan:         1. Foot infection  DX-FOOT-COMPLETE 3+ LEFT   2. Ulcer of left foot, unspecified ulcer stage (HCC)  DX-FOOT-COMPLETE 3+ LEFT           * have asked patient to go to ER for worsening cellulitis while on abx w/ possible deeper soft tissue abscess that may need surgical I&D w/ debridement

## 2019-05-24 ENCOUNTER — NON-PROVIDER VISIT (OUTPATIENT)
Dept: WOUND CARE | Facility: MEDICAL CENTER | Age: 54
End: 2019-05-24
Attending: PHYSICIAN ASSISTANT
Payer: COMMERCIAL

## 2019-05-24 ENCOUNTER — HOSPITAL ENCOUNTER (OUTPATIENT)
Dept: RADIOLOGY | Facility: MEDICAL CENTER | Age: 54
End: 2019-05-24
Attending: NURSE PRACTITIONER
Payer: COMMERCIAL

## 2019-05-24 DIAGNOSIS — T14.8XXA WOUND INFECTION: ICD-10-CM

## 2019-05-24 DIAGNOSIS — L08.9 WOUND INFECTION: ICD-10-CM

## 2019-05-24 DIAGNOSIS — E11.621 TYPE 2 DIABETES MELLITUS WITH FOOT ULCER, WITHOUT LONG-TERM CURRENT USE OF INSULIN (HCC): ICD-10-CM

## 2019-05-24 DIAGNOSIS — L97.509 TYPE 2 DIABETES MELLITUS WITH FOOT ULCER, WITHOUT LONG-TERM CURRENT USE OF INSULIN (HCC): ICD-10-CM

## 2019-05-24 PROCEDURE — 93925 LOWER EXTREMITY STUDY: CPT

## 2019-05-24 PROCEDURE — 93922 UPR/L XTREMITY ART 2 LEVELS: CPT

## 2019-05-24 PROCEDURE — 99211 OFF/OP EST MAY X REQ PHY/QHP: CPT

## 2019-05-24 RX ORDER — AMOXICILLIN AND CLAVULANATE POTASSIUM 875; 125 MG/1; MG/1
1 TABLET, FILM COATED ORAL 2 TIMES DAILY
Qty: 14 TAB | Refills: 0 | Status: SHIPPED | OUTPATIENT
Start: 2019-05-24 | End: 2019-05-31

## 2019-05-24 NOTE — PATIENT INSTRUCTIONS
Keep dressing clean and dry and cover while bathing. Only change dressing if over saturated, soiled or its falling off.     Should you experience any significant changes in your wound(s) such as infection (redness, swelling, localized heat, increased pain, fever >101 F, chills) or have any questions regarding your home care instructions, please contact the wound center [phone]. If after hours, contact your primary care physician or go the hospital emergency room.

## 2019-05-24 NOTE — NON-PROVIDER
Pt presented with new wound to left 3rd dorsal toe and blisters to 2nd and 4th toe. Pt states he tried wearing offloading shoe and felt discomfort. When he took the shoes off he noticed the blisters and new wound. Pt is wearing his own slip on shoes this visit. Arterial study is today, 5/24/19 at 12:45. Pt and spouse also c/o continued erythema and requests more antibiotic. Reviewed photos and discussed assessment with Jackeline PRATHER, who will prescribe 7 more days of Augmentin. Called them via phone to update them on ABX refill, who verbalized understanding.

## 2019-05-26 PROCEDURE — 93925 LOWER EXTREMITY STUDY: CPT | Mod: 26 | Performed by: SURGERY

## 2019-05-26 PROCEDURE — 93922 UPR/L XTREMITY ART 2 LEVELS: CPT | Mod: 26 | Performed by: SURGERY

## 2019-05-29 ENCOUNTER — NON-PROVIDER VISIT (OUTPATIENT)
Dept: WOUND CARE | Facility: MEDICAL CENTER | Age: 54
End: 2019-05-29
Attending: PHYSICIAN ASSISTANT
Payer: COMMERCIAL

## 2019-05-29 PROCEDURE — 97602 WOUND(S) CARE NON-SELECTIVE: CPT

## 2019-05-29 NOTE — PROGRESS NOTES
Patient scheduled to see Dr. Graham on 6/17/19 to go over arterial studies and discuss options. 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 Arelis PRATHER has forwarded arterial study results to Dr. Graham.

## 2019-06-05 ENCOUNTER — APPOINTMENT (OUTPATIENT)
Dept: WOUND CARE | Facility: MEDICAL CENTER | Age: 54
End: 2019-06-05
Attending: PHYSICIAN ASSISTANT
Payer: COMMERCIAL

## 2019-06-12 ENCOUNTER — NON-PROVIDER VISIT (OUTPATIENT)
Dept: WOUND CARE | Facility: MEDICAL CENTER | Age: 54
End: 2019-06-12
Attending: PHYSICIAN ASSISTANT
Payer: COMMERCIAL

## 2019-06-12 PROCEDURE — 99211 OFF/OP EST MAY X REQ PHY/QHP: CPT

## 2019-06-12 NOTE — PATIENT INSTRUCTIONS
-Should you experience any significant changes in your wound(s), such as infection (redness, swelling, localized heat, increased pain, fever > 101 F, chills) or have any questions regarding your home care instructions, please contact the wound center at [phone]. If after hours, contact your primary care physician or go to the hospital emergency room.     -apply betadine daily and allow to dry.

## 2019-06-17 ENCOUNTER — HOSPITAL ENCOUNTER (OUTPATIENT)
Facility: MEDICAL CENTER | Age: 54
End: 2019-06-17
Payer: COMMERCIAL

## 2019-06-17 ENCOUNTER — OFFICE VISIT (OUTPATIENT)
Dept: WOUND CARE | Facility: MEDICAL CENTER | Age: 54
End: 2019-06-17
Attending: PHYSICIAN ASSISTANT
Payer: COMMERCIAL

## 2019-06-17 VITALS
DIASTOLIC BLOOD PRESSURE: 77 MMHG | SYSTOLIC BLOOD PRESSURE: 121 MMHG | TEMPERATURE: 99.3 F | HEART RATE: 100 BPM | OXYGEN SATURATION: 96 %

## 2019-06-17 PROCEDURE — 99213 OFFICE O/P EST LOW 20 MIN: CPT

## 2019-06-17 ASSESSMENT — ENCOUNTER SYMPTOMS
DEPRESSION: 0
NAUSEA: 0
SENSORY CHANGE: 1
COUGH: 0
FEVER: 0
CHILLS: 0
DIARRHEA: 0
VOMITING: 0
CONSTIPATION: 0
CLAUDICATION: 1
NERVOUS/ANXIOUS: 0

## 2019-06-17 NOTE — PROGRESS NOTES
Provider Encounter- Diabetic Foot Ulcer      HISTORY OF PRESENT ILLNESS               START OF CARE IN CLINIC: 2019               REFERRING PROVIDER: Urgent care referred; will send evaluation to PCP - Mj Bai MD                WOUND- Diabetic foot ulcer              LOCATION: Left dorsomedial foot distal                                   Left dorsomedial foot proximal              Left 2nd and 3rd toes              WOUND HISTORY: Patient states the wounds began around 5/3/19 because he was wearing leather shoes that were too tight. He reports that he has some numbness in the medial foot.  He treated the wounds himself at first, cleansing soap and water and then leaving them open to air.   His foot became more swollen and painful, so he presented to an urgent care on .  He was prescribed Bactrim DS and referred to the wound clinic.              PERTINENT PMH: DMII                IMAGING: NA              VASCULAR STUDIES: None on file. Ordered bilateral arterial studies 19                              LAST  WOUND CULTURE DATE : NA                         OFFLOADIN/14-Rx for offloading shoe provided in clinic  DIABETES HX: Diagnosed with type 2 diabetes 15 years ago, and is currently managing with oral medication.  Checks blood sugars 1-2 times per day and reports that these typically run around 1 70-1 80.  Has not had previous diabetes education.  He does have some numbness in his feet.  Usually wears slippers or regular shoes. Does not check his feet routinely.  Has not had previous foot ulcers or foot surgery.  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  TOBACCO USE: He is never used tobacco products      : Initial provider assessment.  Patient presents today wearing slippers.  He is accompanied by younger male family member.  Arterial studies have not yet been completed.  Left pedal pulses monophasic by Doppler.  Wounds were not debrided in clinic today, will  await arterial study results.  Patient is convinced that his wounds are in a state of healing, states he has had similar wounds in the past to his left lower leg which went on to heal.    6/17:  Patient presents wearing today wearing open toed sandals with no protection over the open wounds.  Apparently, he did not fill the prescription for an offloading shoe.  His wife asked me 3 times during our discussion if his foot was safe and I explained the seriousness of the situation.  The patient denies any pain in the foot.    RESULTS:   Most recent A1c:   9.3%    DATE: 5/6/2019 ([facility])    ALYSSA's           RIGHT     Waveform            Systolic BPs (mmHg)                              143           Brachial   Triphasic                                Common Femoral   Monophasic                 109           Posterior Tibial   Monophasic                 100           Dorsalis Pedis                                            Peroneal                              0.76          ALYSSA                                            TBI                          LEFT   Waveform        Systolic BPs (mmHg)                              142           Brachial   Triphasic                                Common Femoral   Monophasic                 124           Posterior Tibial   Monophasic                 113           Dorsalis Pedis                                            Peroneal                              0.87          ALYSAS                                            TBI  Conclusions   1.  Apparent tibial artery stenosis and possible occlusion bilaterally.    2.  Toe pressure indicate severe arteriial occlusive disease and suggest    the possibility of artifically elevated ankle brachial indices.    Arterial duplex imaging  CONCLUSIONS   1. Apparent normal arterial perfusion to the bilateral popliteal arteries.   2.  Tandem lesions in the right posterior tibial artery with short segment    occlusion and reconsituted flow in the  distal artery.    3.  Distal occlusion of both the anterior tibial and peroneal artery.     4.  Left posterior tibial and peroneal artery occlusion with reconstitution    of the distal vessel.     5.  The left anterior tibial artery has tandem significant stenoses.    PAST MEDICAL HISTORY:   Past Medical History:   Diagnosis Date   • Diabetes (HCC)      PAST SURGICAL HISTORY: No past surgical history on file.     MEDICATIONS:   Current Outpatient Prescriptions   Medication   • glipiZIDE (GLUCOTROL) 5 MG Tab   • aspirin 81 MG EC tablet   • atorvastatin (LIPITOR) 40 MG Tab   • metFORMIN ER (GLUCOPHAGE XR) 500 MG TABLET SR 24 HR     No current facility-administered medications for this visit.        ALLERGIES:  No Known Allergies      SOCIAL HISTORY:   Social History     Social History   • Marital status:      Spouse name: N/A   • Number of children: N/A   • Years of education: N/A     Social History Main Topics   • Smoking status: Never Smoker   • Smokeless tobacco: Never Used   • Alcohol use No   • Drug use: No   • Sexual activity: Not on file     Other Topics Concern   • Not on file     Social History Narrative   • No narrative on file       FAMILY HISTORY: No family history on file.     REVIEW OF SYSTEMS:   Review of Systems   Constitutional: Negative for chills and fever.   Respiratory: Negative for cough.    Cardiovascular: Positive for claudication. Negative for chest pain.        Reports pain in calf with ambulation;  Left lower leg has been swollen since wounds developed   Gastrointestinal: Negative for constipation, diarrhea, nausea and vomiting.   Genitourinary: Negative for dysuria.   Musculoskeletal: Negative for joint pain.   Skin: Negative for rash.        Wounds to left dorsal foot since early May 2019, started as large blisters.   Neurological: Positive for sensory change.        Numbness in both feet   Psychiatric/Behavioral: Negative for depression. The patient is not nervous/anxious.         PHYSICAL EXAMINATION:   /77   Pulse 100   Temp 37.4 °C (99.3 °F)   SpO2 96%     Physical Exam   Constitutional: He is oriented to person, place, and time and well-developed, well-nourished, and in no distress.   HENT:   Head: Normocephalic.   Eyes: Pupils are equal, round, and reactive to light.   Neck: Normal range of motion. Neck supple. No JVD present.   Cardiovascular: Normal rate and regular rhythm.    Palpable femoral and popliteal arteries bilaterally.   Unable to palpate pedal pulses in left foot  DP and PT pulses monophasic with Doppler   Pulmonary/Chest: Effort normal. No respiratory distress.   Abdominal: Soft. He exhibits distension.   Musculoskeletal: He exhibits no edema.   Plantar surface of the foot has white color change, possibly indicative of a new area of ischemia   Neurological: He is alert and oriented to person, place, and time.   Both feet insensate to light touch   Skin: Skin is warm.   Dry eschar to left first met head and in a circular area over the upper dorsal foot.   Periwound erythema radiating 10 to 15 cm from wounds  Refer to wound flowsheet and photos   Psychiatric: Mood, memory, affect and judgment normal.       WOUND ASSESSMENT      Wound 05/09/19 Left dorsomedial foot - distal (Active)   Wound Image   6/17/2019  1:30 PM   Site Assessment Dry;Other (Comment) 6/17/2019  1:30 PM   Angelica-wound Assessment Blanchable erythema;Callused;Dry;Edema 6/17/2019  1:30 PM   Margins Defined edges 6/17/2019  1:30 PM   Wound Length (cm) 5 cm 6/17/2019  1:30 PM   Wound Width (cm) 3.9 cm 6/17/2019  1:30 PM   Wound Surface Area (cm^2) 19.5 cm^2 6/17/2019  1:30 PM   Post Wound Length (cm) 5.1 cm 6/12/2019  9:00 AM    Post Wound Width (cm) 3.9 cm 6/12/2019  9:00 AM   Post Wound Surface Area (cm^2) 19.89 cm^2 6/12/2019  9:00 AM   Closure Other (Comment) 6/17/2019  1:30 PM   Drainage Amount KEVIN 6/17/2019  1:30 PM   Drainage Description KEVIN 6/17/2019  1:30 PM   Non-staged Wound Description  Full thickness 6/17/2019  1:30 PM   Treatments Cleansed 5/29/2019  9:00 AM   Cleansing Normal Saline Irrigation 5/29/2019  9:00 AM   Dressing Options Other (Comments) 6/17/2019  1:30 PM   Dressing Cleansing/Solutions 3% Betadine 6/17/2019  1:30 PM   Dressing Changed New 6/17/2019  1:30 PM   Dressing Status Clean;Dry;Intact 5/29/2019  9:00 AM   Dressing Change Frequency Daily 6/17/2019  1:30 PM   WOUND NURSE ONLY - Odor Foul;Mild 6/17/2019  1:30 PM   WOUND NURSE ONLY - Pulses Not palpable;DP 5/29/2019  9:00 AM   WOUND NURSE ONLY - Exposed Structures Other (Comments) 6/17/2019  1:30 PM   WOUND NURSE ONLY - Tissue Type and Percentage 100% eschar 6/12/2019  9:00 AM       Wound 05/09/19 Left dorsomedial foot - proximal (Active)   Wound Image   6/17/2019  1:30 PM   Site Assessment Dry;Other (Comment) 6/17/2019  1:30 PM   Nagelica-wound Assessment Callused;Blanchable erythema;Dry;Edema 6/17/2019  1:30 PM   Margins Defined edges 6/17/2019  1:30 PM   Wound Length (cm) 1.9 cm 6/17/2019  1:30 PM   Wound Width (cm) 2 cm 6/17/2019  1:30 PM   Wound Surface Area (cm^2) 3.8 cm^2 6/17/2019  1:30 PM   Post Wound Length (cm) 2 cm 6/12/2019  9:00 AM    Post Wound Width (cm) 1.6 cm 6/12/2019  9:00 AM   Post Wound Surface Area (cm^2) 3.2 cm^2 6/12/2019  9:00 AM   Tunneling 0 cm 5/9/2019 10:00 AM   Undermining 0 cm 5/9/2019 10:00 AM   Closure Other (Comment) 6/17/2019  1:30 PM   Drainage Amount KEVIN 6/17/2019  1:30 PM   Drainage Description KEVIN 6/17/2019  1:30 PM   Non-staged Wound Description Full thickness 6/17/2019  1:30 PM   Treatments Cleansed 5/24/2019  9:00 AM   Cleansing Normal Saline Irrigation 5/29/2019  9:00 AM   Dressing Options Other (Comments) 6/17/2019  1:30 PM   Dressing Cleansing/Solutions 3% Betadine 6/17/2019  1:30 PM   Dressing Changed Changed 6/12/2019  9:00 AM   Dressing Status Clean;Dry;Intact 5/29/2019  9:00 AM   Dressing Change Frequency Daily 6/17/2019  1:30 PM   WOUND NURSE ONLY - Odor Foul;Mild 6/17/2019  1:30 PM    WOUND NURSE ONLY - Exposed Structures Other (Comments) 6/17/2019  1:30 PM   WOUND NURSE ONLY - Tissue Type and Percentage 100% eschar 6/12/2019  9:00 AM       Wound 05/24/19 Left 3rd dorsal toe (Active)   Wound Image   6/17/2019  1:30 PM   Site Assessment Dry;Other (Comment) 6/17/2019  1:30 PM   Angelica-wound Assessment Edema;Dry;Blanchable erythema 6/17/2019  1:30 PM   Margins Attached edges 6/17/2019  1:30 PM   Wound Length (cm) 0.4 cm 6/17/2019  1:30 PM   Wound Width (cm) 0.4 cm 6/17/2019  1:30 PM   Wound Surface Area (cm^2) 0.16 cm^2 6/17/2019  1:30 PM   Post Wound Length (cm) 0.8 cm 6/12/2019  9:00 AM    Post Wound Width (cm) 0.7 cm 6/12/2019  9:00 AM   Post Wound Depth (cm) 0.1 cm 5/29/2019  9:00 AM   Post Wound Surface Area (cm^2) 0.56 cm^2 6/12/2019  9:00 AM   Tunneling 0 cm 5/29/2019  9:00 AM   Undermining 0 cm 5/29/2019  9:00 AM   Closure Other (Comment) 6/17/2019  1:30 PM   Drainage Amount KEVIN 6/17/2019  1:30 PM   Drainage Description KEVIN 6/17/2019  1:30 PM   Non-staged Wound Description Full thickness 6/17/2019  1:30 PM   Treatments Cleansed 5/29/2019  9:00 AM   Cleansing Normal Saline Irrigation 5/29/2019  9:00 AM   Periwound Protectant Not Applicable 6/17/2019  1:30 PM   Dressing Options Other (Comments) 6/17/2019  1:30 PM   Dressing Cleansing/Solutions 3% Betadine 6/17/2019  1:30 PM   Dressing Changed New 6/17/2019  1:30 PM   Dressing Status Clean;Dry;Intact 5/29/2019  9:00 AM   Dressing Change Frequency Daily 6/17/2019  1:30 PM   WOUND NURSE ONLY - Odor Foul;Mild 6/17/2019  1:30 PM   WOUND NURSE ONLY - Pulses Not palpable 5/29/2019  9:00 AM   WOUND NURSE ONLY - Exposed Structures Other (Comments) 6/17/2019  1:30 PM   WOUND NURSE ONLY - Tissue Type and Percentage 100% eschar 6/12/2019  9:00 AM       Wound 05/29/19 Left 2nd dorsal toe (Active)   Wound Image   6/17/2019  1:30 PM   Site Assessment Dry;Other (Comment) 6/17/2019  1:30 PM   Angelica-wound Assessment Dry;Edema;Blanchable erythema 6/17/2019  1:30  PM   Margins Attached edges 6/17/2019  1:30 PM   Wound Length (cm) 1.1 cm 6/17/2019  1:30 PM   Wound Width (cm) 2 cm 6/17/2019  1:30 PM   Wound Surface Area (cm^2) 2.2 cm^2 6/17/2019  1:30 PM   Post Wound Length (cm) 0.5 cm 5/29/2019  9:00 AM    Post Wound Width (cm) 0.9 cm 5/29/2019  9:00 AM   Post Wound Depth (cm) 0.1 cm 5/29/2019  9:00 AM   Post Wound Surface Area (cm^2) 0.45 cm^2 5/29/2019  9:00 AM   Tunneling 0 cm 5/29/2019  9:00 AM   Undermining 0 cm 5/29/2019  9:00 AM   Closure Other (Comment) 6/17/2019  1:30 PM   Drainage Amount KEVIN 6/17/2019  1:30 PM   Drainage Description KEVIN 6/17/2019  1:30 PM   Non-staged Wound Description Full thickness 6/17/2019  1:30 PM   Treatments Cleansed 5/29/2019  9:00 AM   Cleansing Normal Saline Irrigation 5/29/2019  9:00 AM   Periwound Protectant Not Applicable 6/17/2019  1:30 PM   Dressing Options Other (Comments) 6/17/2019  1:30 PM   Dressing Cleansing/Solutions 3% Betadine 6/17/2019  1:30 PM   Dressing Changed New 6/12/2019  9:00 AM   Dressing Status Clean;Dry;Intact 5/29/2019  9:00 AM   Dressing Change Frequency Daily 6/17/2019  1:30 PM   WOUND NURSE ONLY - Odor Foul;Mild 6/17/2019  1:30 PM   WOUND NURSE ONLY - Pulses Not palpable 5/29/2019  9:00 AM   WOUND NURSE ONLY - Exposed Structures None 5/29/2019  9:00 AM   WOUND NURSE ONLY - Tissue Type and Percentage 100% eschar 6/12/2019  9:00 AM           Wound photos  Left dorsal medial foot, proximal wound.  No debridement today        Left dorsal medial foot wound, distal wound.  No debridement today      Left second toe. No debridement today      Left 3rd toe.  No debridement today        PATIENT EDUCATION  - Importance of tight glucose control for wound healing   - Implications of loss of protective sensation (LOPS) discussed with patient- including increased risk for amputation.  - Advised to check feet at least daily, moisturize feet, and to always wear protective foot wear.   -  Importance of offloading foot to assist  with wound healing  - Advised pt not to trim nails or calluses, seek foot/nail care from podiatrist or certified foot/nail nurse  - Importance of adequate nutrition for wound healing  - Increase protein intake (unless contraindicated by renal status)    ASSESSMENT AND PLAN:     1. Diabetic ulcer of left midfoot associated with type 2 diabetes mellitus, with other ulcer severity (HCC)  Comment: Necrotic wounds x2 to dorsum of left foot.  Present since early May 2019, started as blisters.    6/17:  Diabetic foot ulcer with very poor perfusion and no in-line flow.  If I had to predict, It would be that a BKA is necessary, but an attempt at re-vascularization is the first necessary procedure.  I have called for direct admission with attention to diabetic control (HgA1C is high), antibiotics and then, arteriography with possible intervention using tibial artery atherectomy, if occlusions can be crossed.     2. Uncontrolled type 2 diabetes mellitus with hyperglycemia (HCC)  Comments: Most recent A1c 9.3%.    -Adverse effects of hyperglycemia on wound healing and general health discussed with patient  -He is encouraged to keep his blood sugars below 150 in order to optimize chances for wound healing.    3. Decreased pulses in feet  Comments: Arterial studies were ordered on patient's initial clinic visit, 5/9/2019 6/17:  Arterial imaging suggest terrible perfusion.  I have discussed this case with my associate, Dr. Chatterjee, who is available to perform arteriography and possible intervention tomorrow.       4. Diabetic polyneuropathy associated with type 2 diabetes mellitus (HCC)    6/17:  My exam suggests severe diabetic neuropathy    5. Wound infection  6/17: Perfusion deficit limits ability to treat infection.  Admission for IV antibiotics in combination with eventual debridement and amputation as necessary.       Please note that this dictation was created using voice recognition software. I have worked with technical  experts from [facility] to optimize the interface.  I have made every reasonable attempt to correct obvious errors, but there may be errors of grammar and possibly content that I did not discover before finalizing the note.

## 2019-06-17 NOTE — WOUND TEAM
Pt seen with Dr. Graham - no procedure done. Pt will go home to get bag and then to to ER to check in.

## 2019-06-17 NOTE — PROGRESS NOTES
Direct from Dr Graham's wound care clinic with L diabetic foot wound.  PAD.  Has had wound since begining of May, getting much worse.  Dr Graham is worried he may loose his leg   Angiogram planned for tomorrow  Dr Chatterjee so NPO after midnight

## 2019-06-17 NOTE — PATIENT INSTRUCTIONS
Agreed with Dr. Graham that you will go home, make arrangements and go to ER to be admitted for procedure tomorrow.

## 2019-06-17 NOTE — PROCEDURES
5/24/2019 Vascular Laboratory   Conclusions   1.  Apparent tibial artery stenosis and possible occlusion bilaterally.    2.  Toe pressure indicate severe arteriial occlusive disease and suggest    the possibility of artifically elevated ankle brachial indices.  See report  RIGHT 0.76          ALYSSA                                            TBI                          LEFT                                 0.87          ALYSSA                                             Right TBI: 0.34   Left TBI: 0.15  Lower Extremity   Arterial Duplex Report     Vascular Laboratory   CONCLUSIONS   1. Apparent normal arterial perfusion to the bilateral popliteal arteries.   2.  Tandem lesions in the right posterior tibial artery with short segment    occlusion and reconsituted flow in the distal artery.    3.  Distal occlusion of both the anterior tibial and peroneal artery.     4.  Left posterior tibial and peroneal artery occlusion with reconstitution    of the distal vessel.     5.  The left anterior tibial artery has tandem significant stenoses.

## 2019-06-18 ENCOUNTER — DOCUMENTATION (OUTPATIENT)
Dept: VASCULAR LAB | Facility: MEDICAL CENTER | Age: 54
End: 2019-06-18

## 2019-06-18 NOTE — PROGRESS NOTES
Mister Aviles was seen in the wound care clinic yesterday.I tried my best to explain to both the patient and his wife, the gravity of the situation and my fear about his limb viability.  I outlined a plan o the hospitalist, improved diabetic control and then an angiogram today.  We were hoping to improve perfusion en, debride the foot as needed.     I called his home today, and spoke with his wife.  She related that he wanted to save his foot and asked if I could make an appointment in another week or two.  I again reiterated that if he didn't get in soon he would become very sick from his foot and the dead tissue.  I asked her to please talk to her  and try to convince him to come to the hospital, anytime

## 2019-06-21 ENCOUNTER — HOSPITAL ENCOUNTER (INPATIENT)
Facility: MEDICAL CENTER | Age: 54
LOS: 11 days | DRG: 271 | End: 2019-07-02
Attending: SURGERY | Admitting: SURGERY
Payer: COMMERCIAL

## 2019-06-21 ENCOUNTER — APPOINTMENT (OUTPATIENT)
Dept: RADIOLOGY | Facility: MEDICAL CENTER | Age: 54
DRG: 271 | End: 2019-06-21
Attending: SURGERY
Payer: COMMERCIAL

## 2019-06-21 ENCOUNTER — HOSPITAL ENCOUNTER (EMERGENCY)
Facility: MEDICAL CENTER | Age: 54
End: 2019-06-21
Payer: COMMERCIAL

## 2019-06-21 DIAGNOSIS — I73.9 PERIPHERAL VASCULAR DISEASE, UNSPECIFIED (HCC): ICD-10-CM

## 2019-06-21 DIAGNOSIS — I73.9 PERIPHERAL ARTERIAL DISEASE (HCC): ICD-10-CM

## 2019-06-21 LAB
ANION GAP SERPL CALC-SCNC: 11 MMOL/L (ref 0–11.9)
BUN SERPL-MCNC: 10 MG/DL (ref 8–22)
CALCIUM SERPL-MCNC: 9.2 MG/DL (ref 8.5–10.5)
CHLORIDE SERPL-SCNC: 104 MMOL/L (ref 96–112)
CO2 SERPL-SCNC: 25 MMOL/L (ref 20–33)
CREAT SERPL-MCNC: 0.78 MG/DL (ref 0.5–1.4)
ERYTHROCYTE [DISTWIDTH] IN BLOOD BY AUTOMATED COUNT: 43.3 FL (ref 35.9–50)
GLUCOSE BLD-MCNC: 267 MG/DL (ref 65–99)
GLUCOSE SERPL-MCNC: 103 MG/DL (ref 65–99)
HCT VFR BLD AUTO: 37.1 % (ref 42–52)
HGB BLD-MCNC: 11.6 G/DL (ref 14–18)
MCH RBC QN AUTO: 28.9 PG (ref 27–33)
MCHC RBC AUTO-ENTMCNC: 31.3 G/DL (ref 33.7–35.3)
MCV RBC AUTO: 92.3 FL (ref 81.4–97.8)
PLATELET # BLD AUTO: 243 K/UL (ref 164–446)
PMV BLD AUTO: 10.8 FL (ref 9–12.9)
POTASSIUM SERPL-SCNC: 4.1 MMOL/L (ref 3.6–5.5)
RBC # BLD AUTO: 4.02 M/UL (ref 4.7–6.1)
SODIUM SERPL-SCNC: 140 MMOL/L (ref 135–145)
WBC # BLD AUTO: 7 K/UL (ref 4.8–10.8)

## 2019-06-21 PROCEDURE — 700117 HCHG RX CONTRAST REV CODE 255: Performed by: SURGERY

## 2019-06-21 PROCEDURE — 700101 HCHG RX REV CODE 250: Performed by: HOSPITALIST

## 2019-06-21 PROCEDURE — B41F1ZZ FLUOROSCOPY OF RIGHT LOWER EXTREMITY ARTERIES USING LOW OSMOLAR CONTRAST: ICD-10-PCS | Performed by: SURGERY

## 2019-06-21 PROCEDURE — 700111 HCHG RX REV CODE 636 W/ 250 OVERRIDE (IP): Performed by: SURGERY

## 2019-06-21 PROCEDURE — 160002 HCHG RECOVERY MINUTES (STAT)

## 2019-06-21 PROCEDURE — 047Q3ZZ DILATION OF LEFT ANTERIOR TIBIAL ARTERY, PERCUTANEOUS APPROACH: ICD-10-PCS | Performed by: SURGERY

## 2019-06-21 PROCEDURE — 700111 HCHG RX REV CODE 636 W/ 250 OVERRIDE (IP)

## 2019-06-21 PROCEDURE — 700102 HCHG RX REV CODE 250 W/ 637 OVERRIDE(OP): Performed by: HOSPITALIST

## 2019-06-21 PROCEDURE — 700111 HCHG RX REV CODE 636 W/ 250 OVERRIDE (IP): Performed by: HOSPITALIST

## 2019-06-21 PROCEDURE — 700102 HCHG RX REV CODE 250 W/ 637 OVERRIDE(OP)

## 2019-06-21 PROCEDURE — 700105 HCHG RX REV CODE 258

## 2019-06-21 PROCEDURE — B41G1ZZ FLUOROSCOPY OF LEFT LOWER EXTREMITY ARTERIES USING LOW OSMOLAR CONTRAST: ICD-10-PCS | Performed by: SURGERY

## 2019-06-21 PROCEDURE — 99223 1ST HOSP IP/OBS HIGH 75: CPT | Performed by: HOSPITALIST

## 2019-06-21 PROCEDURE — 80048 BASIC METABOLIC PNL TOTAL CA: CPT

## 2019-06-21 PROCEDURE — 85347 COAGULATION TIME ACTIVATED: CPT | Mod: 91

## 2019-06-21 PROCEDURE — 700105 HCHG RX REV CODE 258: Performed by: HOSPITALIST

## 2019-06-21 PROCEDURE — A9270 NON-COVERED ITEM OR SERVICE: HCPCS | Performed by: HOSPITALIST

## 2019-06-21 PROCEDURE — 770006 HCHG ROOM/CARE - MED/SURG/GYN SEMI*

## 2019-06-21 PROCEDURE — 047S3ZZ DILATION OF LEFT POSTERIOR TIBIAL ARTERY, PERCUTANEOUS APPROACH: ICD-10-PCS | Performed by: SURGERY

## 2019-06-21 PROCEDURE — A9270 NON-COVERED ITEM OR SERVICE: HCPCS

## 2019-06-21 PROCEDURE — 82962 GLUCOSE BLOOD TEST: CPT

## 2019-06-21 PROCEDURE — 99153 MOD SED SAME PHYS/QHP EA: CPT

## 2019-06-21 PROCEDURE — 04CQ3ZZ EXTIRPATION OF MATTER FROM LEFT ANTERIOR TIBIAL ARTERY, PERCUTANEOUS APPROACH: ICD-10-PCS | Performed by: SURGERY

## 2019-06-21 PROCEDURE — 85027 COMPLETE CBC AUTOMATED: CPT

## 2019-06-21 PROCEDURE — 047W3ZZ DILATION OF LEFT FOOT ARTERY, PERCUTANEOUS APPROACH: ICD-10-PCS | Performed by: SURGERY

## 2019-06-21 PROCEDURE — B4101ZZ FLUOROSCOPY OF ABDOMINAL AORTA USING LOW OSMOLAR CONTRAST: ICD-10-PCS | Performed by: SURGERY

## 2019-06-21 RX ORDER — ATORVASTATIN CALCIUM 40 MG/1
40 TABLET, FILM COATED ORAL EVERY EVENING
Status: DISCONTINUED | OUTPATIENT
Start: 2019-06-21 | End: 2019-07-02 | Stop reason: HOSPADM

## 2019-06-21 RX ORDER — RIFAMPIN 300 MG/1
300 CAPSULE ORAL 2 TIMES DAILY
Status: ON HOLD | COMMUNITY
End: 2019-07-02

## 2019-06-21 RX ORDER — CLOPIDOGREL BISULFATE 75 MG/1
75 TABLET ORAL DAILY
Qty: 30 TAB | Refills: 12 | Status: SHIPPED | OUTPATIENT
Start: 2019-06-21 | End: 2019-07-05

## 2019-06-21 RX ORDER — AMOXICILLIN 250 MG
2 CAPSULE ORAL 2 TIMES DAILY
Status: DISCONTINUED | OUTPATIENT
Start: 2019-06-21 | End: 2019-07-02 | Stop reason: HOSPADM

## 2019-06-21 RX ORDER — SODIUM CHLORIDE 9 MG/ML
500 INJECTION, SOLUTION INTRAVENOUS
Status: DISPENSED | OUTPATIENT
Start: 2019-06-21 | End: 2019-06-21

## 2019-06-21 RX ORDER — POLYETHYLENE GLYCOL 3350 17 G/17G
1 POWDER, FOR SOLUTION ORAL
Status: DISCONTINUED | OUTPATIENT
Start: 2019-06-21 | End: 2019-07-02 | Stop reason: HOSPADM

## 2019-06-21 RX ORDER — CLOPIDOGREL BISULFATE 75 MG/1
TABLET ORAL
Status: COMPLETED
Start: 2019-06-21 | End: 2019-06-21

## 2019-06-21 RX ORDER — INSULIN GLARGINE 100 [IU]/ML
10 INJECTION, SOLUTION SUBCUTANEOUS EVERY EVENING
Status: DISCONTINUED | OUTPATIENT
Start: 2019-06-21 | End: 2019-06-23

## 2019-06-21 RX ORDER — HEPARIN SODIUM,PORCINE 1000/ML
VIAL (ML) INJECTION
Status: COMPLETED
Start: 2019-06-21 | End: 2019-06-21

## 2019-06-21 RX ORDER — IODIXANOL 270 MG/ML
50 INJECTION, SOLUTION INTRAVASCULAR ONCE
Status: COMPLETED | OUTPATIENT
Start: 2019-06-21 | End: 2019-06-21

## 2019-06-21 RX ORDER — ONDANSETRON 2 MG/ML
4 INJECTION INTRAMUSCULAR; INTRAVENOUS EVERY 4 HOURS PRN
Status: DISCONTINUED | OUTPATIENT
Start: 2019-06-21 | End: 2019-07-02 | Stop reason: HOSPADM

## 2019-06-21 RX ORDER — CLARITHROMYCIN 500 MG/1
500 TABLET, COATED ORAL EVERY 12 HOURS
Status: ON HOLD | COMMUNITY
End: 2019-07-02

## 2019-06-21 RX ORDER — HEPARIN SODIUM 5000 [USP'U]/ML
5000 INJECTION, SOLUTION INTRAVENOUS; SUBCUTANEOUS EVERY 8 HOURS
Status: DISCONTINUED | OUTPATIENT
Start: 2019-06-21 | End: 2019-07-02 | Stop reason: HOSPADM

## 2019-06-21 RX ORDER — METFORMIN HYDROCHLORIDE 500 MG/1
500 TABLET, EXTENDED RELEASE ORAL DAILY
Status: DISCONTINUED | OUTPATIENT
Start: 2019-06-22 | End: 2019-06-22

## 2019-06-21 RX ORDER — BISACODYL 10 MG
10 SUPPOSITORY, RECTAL RECTAL
Status: DISCONTINUED | OUTPATIENT
Start: 2019-06-21 | End: 2019-07-02 | Stop reason: HOSPADM

## 2019-06-21 RX ORDER — HEPARIN SODIUM 1000 [USP'U]/ML
3000 INJECTION, SOLUTION INTRAVENOUS; SUBCUTANEOUS ONCE
Status: COMPLETED | OUTPATIENT
Start: 2019-06-21 | End: 2019-06-21

## 2019-06-21 RX ORDER — MIDAZOLAM HYDROCHLORIDE 1 MG/ML
INJECTION INTRAMUSCULAR; INTRAVENOUS
Status: COMPLETED
Start: 2019-06-21 | End: 2019-06-21

## 2019-06-21 RX ORDER — OXYCODONE HYDROCHLORIDE 5 MG/1
5-10 TABLET ORAL EVERY 4 HOURS PRN
Status: DISCONTINUED | OUTPATIENT
Start: 2019-06-21 | End: 2019-07-02 | Stop reason: HOSPADM

## 2019-06-21 RX ORDER — SODIUM CHLORIDE 9 MG/ML
INJECTION, SOLUTION INTRAVENOUS
Status: COMPLETED
Start: 2019-06-21 | End: 2019-06-21

## 2019-06-21 RX ORDER — MIDAZOLAM HYDROCHLORIDE 1 MG/ML
.5-2 INJECTION INTRAMUSCULAR; INTRAVENOUS PRN
Status: ACTIVE | OUTPATIENT
Start: 2019-06-21 | End: 2019-06-21

## 2019-06-21 RX ORDER — PROMETHAZINE HYDROCHLORIDE 12.5 MG/1
12.5-25 SUPPOSITORY RECTAL EVERY 4 HOURS PRN
Status: DISCONTINUED | OUTPATIENT
Start: 2019-06-21 | End: 2019-07-02 | Stop reason: HOSPADM

## 2019-06-21 RX ORDER — ONDANSETRON 4 MG/1
4 TABLET, ORALLY DISINTEGRATING ORAL EVERY 4 HOURS PRN
Status: DISCONTINUED | OUTPATIENT
Start: 2019-06-21 | End: 2019-07-02 | Stop reason: HOSPADM

## 2019-06-21 RX ORDER — HEPARIN SODIUM 1000 [USP'U]/ML
2000 INJECTION, SOLUTION INTRAVENOUS; SUBCUTANEOUS ONCE
Status: COMPLETED | OUTPATIENT
Start: 2019-06-21 | End: 2019-06-21

## 2019-06-21 RX ORDER — CLOPIDOGREL BISULFATE 75 MG/1
150 TABLET ORAL ONCE
Status: COMPLETED | OUTPATIENT
Start: 2019-06-21 | End: 2019-06-21

## 2019-06-21 RX ORDER — ACETAMINOPHEN 325 MG/1
650 TABLET ORAL EVERY 6 HOURS PRN
Status: DISCONTINUED | OUTPATIENT
Start: 2019-06-21 | End: 2019-07-02 | Stop reason: HOSPADM

## 2019-06-21 RX ORDER — PROMETHAZINE HYDROCHLORIDE 25 MG/1
12.5-25 TABLET ORAL EVERY 4 HOURS PRN
Status: DISCONTINUED | OUTPATIENT
Start: 2019-06-21 | End: 2019-07-02 | Stop reason: HOSPADM

## 2019-06-21 RX ORDER — HEPARIN SODIUM 1000 [USP'U]/ML
7000 INJECTION, SOLUTION INTRAVENOUS; SUBCUTANEOUS ONCE
Status: COMPLETED | OUTPATIENT
Start: 2019-06-21 | End: 2019-06-21

## 2019-06-21 RX ORDER — GLIPIZIDE 5 MG/1
5 TABLET ORAL
Status: DISCONTINUED | OUTPATIENT
Start: 2019-06-21 | End: 2019-06-22

## 2019-06-21 RX ORDER — CLOPIDOGREL BISULFATE 75 MG/1
75 TABLET ORAL DAILY
Status: DISCONTINUED | OUTPATIENT
Start: 2019-06-22 | End: 2019-07-02 | Stop reason: HOSPADM

## 2019-06-21 RX ORDER — VERAPAMIL HYDROCHLORIDE 2.5 MG/ML
INJECTION, SOLUTION INTRAVENOUS
Status: COMPLETED
Start: 2019-06-21 | End: 2019-06-21

## 2019-06-21 RX ADMIN — DOXYCYCLINE 100 MG: 100 INJECTION, POWDER, LYOPHILIZED, FOR SOLUTION INTRAVENOUS at 19:16

## 2019-06-21 RX ADMIN — ATORVASTATIN CALCIUM 40 MG: 40 TABLET, FILM COATED ORAL at 17:12

## 2019-06-21 RX ADMIN — CEFTRIAXONE SODIUM 1 G: 1 INJECTION, POWDER, FOR SOLUTION INTRAMUSCULAR; INTRAVENOUS at 18:16

## 2019-06-21 RX ADMIN — FENTANYL CITRATE 25 MCG: 50 INJECTION, SOLUTION INTRAMUSCULAR; INTRAVENOUS at 09:33

## 2019-06-21 RX ADMIN — CLOPIDOGREL BISULFATE 150 MG: 75 TABLET ORAL at 12:33

## 2019-06-21 RX ADMIN — MIDAZOLAM 1 MG: 1 INJECTION INTRAMUSCULAR; INTRAVENOUS at 09:33

## 2019-06-21 RX ADMIN — SODIUM CHLORIDE 500 ML: 9 INJECTION, SOLUTION INTRAVENOUS at 19:00

## 2019-06-21 RX ADMIN — GLIPIZIDE 5 MG: 5 TABLET ORAL at 17:12

## 2019-06-21 RX ADMIN — MIDAZOLAM HYDROCHLORIDE 1 MG: 1 INJECTION, SOLUTION INTRAMUSCULAR; INTRAVENOUS at 10:42

## 2019-06-21 RX ADMIN — HEPARIN SODIUM 2000 UNITS: 1000 INJECTION, SOLUTION INTRAVENOUS; SUBCUTANEOUS at 09:40

## 2019-06-21 RX ADMIN — FENTANYL CITRATE 25 MCG: 50 INJECTION, SOLUTION INTRAMUSCULAR; INTRAVENOUS at 08:45

## 2019-06-21 RX ADMIN — INSULIN HUMAN 5 UNITS: 100 INJECTION, SOLUTION PARENTERAL at 21:08

## 2019-06-21 RX ADMIN — HEPARIN SODIUM 7000 UNITS: 1000 INJECTION, SOLUTION INTRAVENOUS; SUBCUTANEOUS at 08:52

## 2019-06-21 RX ADMIN — MIDAZOLAM 1 MG: 1 INJECTION INTRAMUSCULAR; INTRAVENOUS at 10:42

## 2019-06-21 RX ADMIN — IODIXANOL 50 ML: 270 INJECTION, SOLUTION INTRAVASCULAR at 11:15

## 2019-06-21 RX ADMIN — VERAPAMIL HYDROCHLORIDE 5 MG: 2.5 INJECTION, SOLUTION INTRAVENOUS at 09:15

## 2019-06-21 RX ADMIN — HEPARIN SODIUM 3000 UNITS: 1000 INJECTION, SOLUTION INTRAVENOUS; SUBCUTANEOUS at 09:25

## 2019-06-21 RX ADMIN — FENTANYL CITRATE 50 MCG: 50 INJECTION, SOLUTION INTRAMUSCULAR; INTRAVENOUS at 10:42

## 2019-06-21 RX ADMIN — NITROGLYCERIN 2 ML: 20 INJECTION INTRAVENOUS at 10:55

## 2019-06-21 RX ADMIN — HEPARIN SODIUM 5000 UNITS: 5000 INJECTION, SOLUTION INTRAVENOUS; SUBCUTANEOUS at 21:08

## 2019-06-21 RX ADMIN — FENTANYL CITRATE 25 MCG: 50 INJECTION INTRAMUSCULAR; INTRAVENOUS at 08:45

## 2019-06-21 RX ADMIN — INSULIN HUMAN 5 UNITS: 100 INJECTION, SOLUTION PARENTERAL at 18:45

## 2019-06-21 RX ADMIN — MIDAZOLAM 1 MG: 1 INJECTION INTRAMUSCULAR; INTRAVENOUS at 08:45

## 2019-06-21 RX ADMIN — MIDAZOLAM HYDROCHLORIDE 1 MG: 1 INJECTION, SOLUTION INTRAMUSCULAR; INTRAVENOUS at 08:45

## 2019-06-21 RX ADMIN — FENTANYL CITRATE 50 MCG: 50 INJECTION, SOLUTION INTRAMUSCULAR; INTRAVENOUS at 10:03

## 2019-06-21 RX ADMIN — NITROGLYCERIN 25 ML: 20 INJECTION INTRAVENOUS at 09:25

## 2019-06-21 RX ADMIN — FENTANYL CITRATE 50 MCG: 50 INJECTION INTRAMUSCULAR; INTRAVENOUS at 10:42

## 2019-06-21 RX ADMIN — HEPARIN SODIUM 3000 UNITS: 1000 INJECTION, SOLUTION INTRAVENOUS; SUBCUTANEOUS at 10:14

## 2019-06-21 ASSESSMENT — ENCOUNTER SYMPTOMS
LOSS OF CONSCIOUSNESS: 0
FEVER: 0
DOUBLE VISION: 0
SHORTNESS OF BREATH: 0
SORE THROAT: 0
NAUSEA: 0
VOMITING: 0
HEADACHES: 0
BLURRED VISION: 0
ABDOMINAL PAIN: 0
COUGH: 0
DIZZINESS: 0
CHILLS: 0
BACK PAIN: 0
PALPITATIONS: 0
DIARRHEA: 0

## 2019-06-21 ASSESSMENT — COGNITIVE AND FUNCTIONAL STATUS - GENERAL
MOBILITY SCORE: 24
DAILY ACTIVITIY SCORE: 24
SUGGESTED CMS G CODE MODIFIER MOBILITY: CH
SUGGESTED CMS G CODE MODIFIER DAILY ACTIVITY: CH

## 2019-06-21 ASSESSMENT — COPD QUESTIONNAIRES
DURING THE PAST 4 WEEKS HOW MUCH DID YOU FEEL SHORT OF BREATH: NONE/LITTLE OF THE TIME
HAVE YOU SMOKED AT LEAST 100 CIGARETTES IN YOUR ENTIRE LIFE: NO/DON'T KNOW
DO YOU EVER COUGH UP ANY MUCUS OR PHLEGM?: NO/ONLY WITH OCCASIONAL COLDS OR INFECTIONS
COPD SCREENING SCORE: 1

## 2019-06-21 ASSESSMENT — PATIENT HEALTH QUESTIONNAIRE - PHQ9
SUM OF ALL RESPONSES TO PHQ9 QUESTIONS 1 AND 2: 0
2. FEELING DOWN, DEPRESSED, IRRITABLE, OR HOPELESS: NOT AT ALL
1. LITTLE INTEREST OR PLEASURE IN DOING THINGS: NOT AT ALL

## 2019-06-21 ASSESSMENT — LIFESTYLE VARIABLES
EVER_SMOKED: NEVER
EVER_SMOKED: NEVER

## 2019-06-21 NOTE — CARE PLAN
Problem: Safety  Goal: Will remain free from falls  Outcome: PROGRESSING AS EXPECTED  Encouraged pt to call for assistance. Pt verbalizes understanding.     Problem: Infection  Goal: Will remain free from infection  Outcome: PROGRESSING AS EXPECTED  Monitoring wound site. Limb preservation to consult.

## 2019-06-21 NOTE — PROGRESS NOTES
Pt arrived to T403-2.  VSS. Alert, oriented x4.   Denies pain.  R groin site with gauze and tegaderm, CDI.  Palpable 1+ pedal pulses bilaterally.   Bilat feet warm. States tingling is present, baseline at home.  L foot wounds PADMAJA, dry. Limb preservation consult in place.  Updated on plan of care. Safety education provided. Bed locked in low. Call light within reach. Rounding in place.

## 2019-06-21 NOTE — PROGRESS NOTES
IR Nursing Note:    Patient consented by Dr. Chatterjee, all questions answered. Dr. Chatterjee performed LLE Angiogram with possible Tibial Atherectomy.  Right femoral artery accessed. The pt appeared to tolerate the procedure well; ETCo2 baseline 35-37, with consistent waveform during the procedure. Angio-seal deployed in the right femoral artery at 1100. Gauze and tegaderm applied to right groin, CDI and soft; pressure held x 5 minutes.  Pt alert and verbally appropriate post procedure, vital signs stable during procedure  and transport, see flow sheet for vital signs.  Report given to KEENA Guo.  RN transported pt to PPU with Sao2 monitor.      Rotablator used during procedure.    Angio-seal 6F. REF [id number]. LOT [id number]

## 2019-06-21 NOTE — OP REPORT
OPERATIVE REPORT      Patient:  Israel Aviles     Procedure Date:  06/21/19    Indication:  The patient presents for left lower extremity angiogram for critical limb ischemia with extensive tissue loss to the foot. Diagnostic angiography is indicated as there is no pre-operative CT scan to guide therapy.    Pre-Operative Diagnosis:  PAD   Left lower extremity critical limb ischemia  DM    Post-Operative Diagnosis:  Same    Procedure(s):   Diagnostics  US guided access of the right common femoral artery  Introduction of catheter to aorta  Abdominal aortogram  Bilateral lower extremity angiogram  Selective catheterization of the left anterior tibial artery (additional 3rd order cath)  Selective catheterization of the left posterior tibial artery (additional 3rd order cath)   Selective catheterization of the left dorsalis pedis artery (additional 3rd order cath)  Selective catheterization of the left lateral tarsal artery (additional 3rd order cath)  Additional selective angiographic views x 8     Intervention(s)  Left dorsalis pedis angioplasty  Left anterior tibial artery angioplasty and atherectomy  Left posterior tibial artery angioplasty     Moderate Sedation, 150 minutes    Stent(s) Placed:  None    Surgeon:  Aleksander Chatterjee M.D.    Assistant:  None    Anesthesia:  Moderate Sedation    EBL:  10cc    Radiation:  102 mGy    Contrast:  50cc    Specimen:  None    Complications:  None    Findings:  Aortogram revealed no flow-limiting lesion in the paravisceral segment. The bilateral renal arteries are patent. Bilateral iliac angiogram showed the common iliacs, IIAs, and external iliacs are patent. Right lower extremity angiogram through the access sheath confirmed the common femoral, profunda, and visualized portions of the SFA are widely patent.    Left lower extremity angiogram showed no flow-limiting lesion in the common femoral, profunda, SFA, or popliteal artery. Below the knee, the AT  was heavily diseased along its distal 2/3. The AT occludes at the ankle. The pedal arch was diseased and there was minimal runoff to the toes.    The proximal 1/3 of the posterior tibial artery was occluded just after its take off from the TP trunk. The PT reconstituted in the distal leg and had another focal area of high-grade stenosis at the ankle. The medial and lateral plantar arteries are patent but diminutive. The peroneal artery is occluded along its entire course and does not fill the foot.    Angioplasty of the left dorsalis pedis was performed using a 1.5mm balloon. Atherectomy of the proximal 1/3 of the AT and angioplasty of the entire AT was performed using a 3mm balloon.     Angioplasty of the entire PT was performed using a 3mm balloon.    At the conclusion of the case, the patient had rapid two-vessel runoff to the foot via the PT and AT and filling of the pedal arch. He had a palpable PT and DP pulse.    Procedure:  Informed consent was obtained from the patient in the pre-operative holding area. All risks, benefits, and alternatives were explained and he elected to proceed. The patient was brought to the interventional suite and placed on the table in a supine position. Angelica-operative antibiotics were given and a time-out was performed verifying the correct site of surgery. The patient was prepped and draped in the usual sterile fashion.    A trained nurse acting under my direct supervision administered conscious sedation using fentanyl and versed. Pulse oximetry and continuous EKG tracings were monitored throughout the case. Total sedation time was 150 minutes.    I began by accessing the right common femoral artery under ultrasound visualization. The vessel was patent and the needle was visualized entering the artery. Images were saved in the medical record.    A 5 Greek sheath was inserted retrograde in the femoral artery. I performed right lower extremity angiography through the sheath which  confirmed placement over the femoral head and revealed findings as noted above. The patient was given 100U/kg heparin which was allowed to circulate for three minutes prior to checking an ACT. Once the ACT was over 250, the vessels were sequentially clamped. ACTs were checked every 30 minutes and maintained between 250-300 for the duration of the case..    I then advanced a flush catheter over a wire into the abdominal aorta and performed an aortogram with findings noted above. The catheter was then pulled back to the aortic bifurcation and bilateral iliac angiography was performed. Findings are noted above.     Once diagnostic imaging was obtained, I exchanged for a stiff wire and up-sized to a 6 Kinyarwanda sheath which was advanced into the left popliteal artery.     I selectively catheterized the left anterior tibial artery and performed selective angiography which showed the vessel was diffusely diseased along its entire course and occluded at the ankle with reconstitution of the DP. The pedal arch was stenotic.    I was able to cross the occlusion at the ankle and selectively catheterize the dorsalis pedis artery and pedal arch with an 0.014 system and performed balloon angioplasty of the pedal arch and dorsalis pedis using a 1.5mm balloon.    Next, I performed atherectomy of the proximal 1/3 of the AT. The smallest lalo would not pass through the tightest area of stenosis in the mid-AT. I then advanced a 3mm balloon to the distal AT and performed angioplasty of the entire AT.    Angiography confirmed the AT was patent with less than 30% residual stenosis, however the vessel is diffusely diseased.    Next, I selectively catheterized the left posterior tibial artery and obtained additional angiography views. The proximal 1/3 of the PT was occluded. Additional findings are noted above. I was able to cross the occlusion and an high-grade area of stenosis at ankle and catheterize the lateral tarsal artery. I exchanged  for a stiff wire and attempted to pass a 3mm balloon but it would not cross the occlusion.     I then down-sized to a 2mm balloon and was able to pre-dilate the occluded segment. I then was able to advanced the 3mm balloon to the ankle and performed angioplasty of the entire PT. Completion imaging showed a residual high-grade stenosis at the ankle. This was treated with a 2mm ballon.     400mcg of nitroglycerine were administered and allowed to circulate. Completion angiography confirmed rapid two-vessel runoff to the foot via the PT and AT, rapid filling of the pedal arch and runoff to the toes.    This concluded the procedure. The sheath in the access site was removed and the artery was closed with a 6 Pashto angioseal. The patient was recovered from sedation and taken to the recovery room in stable condition.     At the conclusion of the case, he had a palpable PT and biphasic signal in the AT.    Aleksander Chatterjee M.D., MD  Vascular Surgeon  [facility]

## 2019-06-22 LAB
ACT BLD: 230 SEC (ref 74–137)
ACT BLD: 230 SEC (ref 74–137)
ACT BLD: 263 SEC (ref 74–137)
ANION GAP SERPL CALC-SCNC: 7 MMOL/L (ref 0–11.9)
BUN SERPL-MCNC: 12 MG/DL (ref 8–22)
CALCIUM SERPL-MCNC: 9.3 MG/DL (ref 8.5–10.5)
CHLORIDE SERPL-SCNC: 104 MMOL/L (ref 96–112)
CO2 SERPL-SCNC: 25 MMOL/L (ref 20–33)
CREAT SERPL-MCNC: 0.75 MG/DL (ref 0.5–1.4)
ERYTHROCYTE [DISTWIDTH] IN BLOOD BY AUTOMATED COUNT: 43 FL (ref 35.9–50)
GLUCOSE BLD-MCNC: 122 MG/DL (ref 65–99)
GLUCOSE BLD-MCNC: 171 MG/DL (ref 65–99)
GLUCOSE BLD-MCNC: 247 MG/DL (ref 65–99)
GLUCOSE BLD-MCNC: 284 MG/DL (ref 65–99)
GLUCOSE SERPL-MCNC: 115 MG/DL (ref 65–99)
HCT VFR BLD AUTO: 36.3 % (ref 42–52)
HGB BLD-MCNC: 11.6 G/DL (ref 14–18)
MCH RBC QN AUTO: 29.5 PG (ref 27–33)
MCHC RBC AUTO-ENTMCNC: 32 G/DL (ref 33.7–35.3)
MCV RBC AUTO: 92.4 FL (ref 81.4–97.8)
PLATELET # BLD AUTO: 232 K/UL (ref 164–446)
PMV BLD AUTO: 11.1 FL (ref 9–12.9)
POTASSIUM SERPL-SCNC: 4.2 MMOL/L (ref 3.6–5.5)
RBC # BLD AUTO: 3.93 M/UL (ref 4.7–6.1)
SODIUM SERPL-SCNC: 136 MMOL/L (ref 135–145)
WBC # BLD AUTO: 7.2 K/UL (ref 4.8–10.8)

## 2019-06-22 PROCEDURE — 700102 HCHG RX REV CODE 250 W/ 637 OVERRIDE(OP): Performed by: INTERNAL MEDICINE

## 2019-06-22 PROCEDURE — 36415 COLL VENOUS BLD VENIPUNCTURE: CPT

## 2019-06-22 PROCEDURE — 700102 HCHG RX REV CODE 250 W/ 637 OVERRIDE(OP): Performed by: HOSPITALIST

## 2019-06-22 PROCEDURE — 700101 HCHG RX REV CODE 250: Performed by: HOSPITALIST

## 2019-06-22 PROCEDURE — 700105 HCHG RX REV CODE 258: Performed by: HOSPITALIST

## 2019-06-22 PROCEDURE — 700111 HCHG RX REV CODE 636 W/ 250 OVERRIDE (IP): Performed by: HOSPITALIST

## 2019-06-22 PROCEDURE — 770006 HCHG ROOM/CARE - MED/SURG/GYN SEMI*

## 2019-06-22 PROCEDURE — A9270 NON-COVERED ITEM OR SERVICE: HCPCS | Performed by: INTERNAL MEDICINE

## 2019-06-22 PROCEDURE — 82962 GLUCOSE BLOOD TEST: CPT | Mod: 91

## 2019-06-22 PROCEDURE — 99233 SBSQ HOSP IP/OBS HIGH 50: CPT | Performed by: INTERNAL MEDICINE

## 2019-06-22 PROCEDURE — 85027 COMPLETE CBC AUTOMATED: CPT

## 2019-06-22 PROCEDURE — A9270 NON-COVERED ITEM OR SERVICE: HCPCS | Performed by: HOSPITALIST

## 2019-06-22 PROCEDURE — 80048 BASIC METABOLIC PNL TOTAL CA: CPT

## 2019-06-22 RX ORDER — DOXYCYCLINE 100 MG/1
100 TABLET ORAL EVERY 12 HOURS
Status: DISCONTINUED | OUTPATIENT
Start: 2019-06-22 | End: 2019-06-25

## 2019-06-22 RX ADMIN — INSULIN GLARGINE 10 UNITS: 100 INJECTION, SOLUTION SUBCUTANEOUS at 17:21

## 2019-06-22 RX ADMIN — HEPARIN SODIUM 5000 UNITS: 5000 INJECTION, SOLUTION INTRAVENOUS; SUBCUTANEOUS at 05:55

## 2019-06-22 RX ADMIN — INSULIN HUMAN 3 UNITS: 100 INJECTION, SOLUTION PARENTERAL at 21:41

## 2019-06-22 RX ADMIN — ASPIRIN 81 MG: 81 TABLET, COATED ORAL at 06:00

## 2019-06-22 RX ADMIN — CLOPIDOGREL BISULFATE 75 MG: 75 TABLET ORAL at 05:53

## 2019-06-22 RX ADMIN — ATORVASTATIN CALCIUM 40 MG: 40 TABLET, FILM COATED ORAL at 17:18

## 2019-06-22 RX ADMIN — INSULIN HUMAN 5 UNITS: 100 INJECTION, SOLUTION PARENTERAL at 17:27

## 2019-06-22 RX ADMIN — INSULIN HUMAN 2 UNITS: 100 INJECTION, SOLUTION PARENTERAL at 12:32

## 2019-06-22 RX ADMIN — DOXYCYCLINE 100 MG: 100 TABLET, FILM COATED ORAL at 17:20

## 2019-06-22 RX ADMIN — HEPARIN SODIUM 5000 UNITS: 5000 INJECTION, SOLUTION INTRAVENOUS; SUBCUTANEOUS at 13:55

## 2019-06-22 RX ADMIN — CEFTRIAXONE SODIUM 1 G: 1 INJECTION, POWDER, FOR SOLUTION INTRAMUSCULAR; INTRAVENOUS at 17:19

## 2019-06-22 RX ADMIN — HEPARIN SODIUM 5000 UNITS: 5000 INJECTION, SOLUTION INTRAVENOUS; SUBCUTANEOUS at 21:38

## 2019-06-22 RX ADMIN — DOXYCYCLINE 100 MG: 100 INJECTION, POWDER, LYOPHILIZED, FOR SOLUTION INTRAVENOUS at 05:56

## 2019-06-22 ASSESSMENT — ENCOUNTER SYMPTOMS
NAUSEA: 0
HEADACHES: 0
TREMORS: 0
COUGH: 0
BLURRED VISION: 0
EYE PAIN: 0
ABDOMINAL PAIN: 0
VOMITING: 0
PND: 0
NECK PAIN: 0
DIARRHEA: 0
FEVER: 0
CONSTIPATION: 0
WEIGHT LOSS: 0
PALPITATIONS: 0
HEARTBURN: 0
WEAKNESS: 0
FALLS: 0
BACK PAIN: 0
SPUTUM PRODUCTION: 0
DEPRESSION: 0
WHEEZING: 0
CHILLS: 0
DIZZINESS: 0
SHORTNESS OF BREATH: 0
SEIZURES: 0
SPEECH CHANGE: 0

## 2019-06-22 ASSESSMENT — LIFESTYLE VARIABLES: SUBSTANCE_ABUSE: 0

## 2019-06-22 NOTE — ASSESSMENT & PLAN NOTE
Patient currently has no further symptoms  I continue aspirin and adding Plavix for reasons of his PAD.  I also continue patient with statin.  monitor

## 2019-06-22 NOTE — PROGRESS NOTES
[facility] Medicine Daily Progress Note    Date of Service  6/22/2019    Chief Complaint  53 y.o. male admitted 6/21/2019 with complains of left foot and heel ulcer    Hospital Course    Past medical history of PAD, diabetes uncontrolled, presented with complains of left foot and heel ulcer.  Patient was evaluated by vascular surgery and underwent angiogram and angioplasty.  Patient was put on antiplatelet therapy understanding.  Patient was also put on insulin for diabetes control.  Patient will have orthopedics and LPS evaluation.        Interval Problem Update  6/22.  Patient has been stable and comfortable overnight but still complains of left foot pain. Patient's pain is local, 6-8,/10, intermittent and does not radiate to other location, sharp and with some tingling. Can be controlled by pain meds. Dressing in place.      Consultants/Specialty  Vascular surgery, LPS, orthopedics    Code Status  Full code    Disposition  To be determined    Review of Systems  Review of Systems   Constitutional: Negative for chills, fever and weight loss.   HENT: Negative for congestion, ear discharge, ear pain, hearing loss and nosebleeds.    Eyes: Negative for blurred vision and pain.   Respiratory: Negative for cough, sputum production, shortness of breath and wheezing.    Cardiovascular: Negative for chest pain, palpitations, leg swelling and PND.   Gastrointestinal: Negative for abdominal pain, constipation, diarrhea, heartburn, nausea and vomiting.   Genitourinary: Negative for dysuria, frequency and hematuria.   Musculoskeletal: Positive for joint pain. Negative for back pain, falls and neck pain.   Skin: Negative for rash.        Left foot unhealed ulcer   Neurological: Negative for dizziness, tremors, speech change, seizures, weakness and headaches.   Psychiatric/Behavioral: Negative for depression, substance abuse and suicidal ideas.        Physical Exam  Temp:  [36.1 °C (97 °F)-36.6 °C (97.8 °F)] 36.2 °C (97.1  °F)  Pulse:  [90-96] 96  Resp:  [15-22] 22  BP: (118-150)/(76-94) 143/88  SpO2:  [94 %-99 %] 99 %    Physical Exam   Constitutional: He is oriented to person, place, and time. He appears well-developed and well-nourished.   HENT:   Head: Normocephalic.   Eyes: Pupils are equal, round, and reactive to light. EOM are normal.   Neck: Normal range of motion. Neck supple. No JVD present. No thyromegaly present.   Cardiovascular: Normal rate, regular rhythm and normal heart sounds.  Exam reveals no gallop and no friction rub.    No murmur heard.  Pulmonary/Chest: Effort normal and breath sounds normal. No respiratory distress. He has no wheezes. He has no rales. He exhibits no tenderness.   Abdominal: Soft. Bowel sounds are normal. He exhibits no distension and no mass. There is no tenderness. There is no rebound and no guarding.   Musculoskeletal: Normal range of motion. He exhibits no edema.   Lymphadenopathy:     He has no cervical adenopathy.   Neurological: He is alert and oriented to person, place, and time. He displays normal reflexes. No cranial nerve deficit.   Skin: Skin is dry. No rash noted. There is erythema.   Left foot and heel ulcer with surrounding erythema   Psychiatric: He has a normal mood and affect.   Nursing note and vitals reviewed.      Fluids    Intake/Output Summary (Last 24 hours) at 06/22/19 1416  Last data filed at 06/22/19 0800   Gross per 24 hour   Intake             2120 ml   Output                0 ml   Net             2120 ml       Laboratory  Recent Labs      06/21/19   0715  06/22/19   0408   WBC  7.0  7.2   RBC  4.02*  3.93*   HEMOGLOBIN  11.6*  11.6*   HEMATOCRIT  37.1*  36.3*   MCV  92.3  92.4   MCH  28.9  29.5   MCHC  31.3*  32.0*   RDW  43.3  43.0   PLATELETCT  243  232   MPV  10.8  11.1     Recent Labs      06/21/19   0715  06/22/19   0408   SODIUM  140  136   POTASSIUM  4.1  4.2   CHLORIDE  104  104   CO2  25  25   GLUCOSE  103*  115*   BUN  10  12   CREATININE  0.78  0.75    CALCIUM  9.2  9.3                   Imaging  IR-EXTREMITY ANGIOGRAM-UNILATERAL LEFT    (Results Pending)        Assessment/Plan  * Peripheral arterial disease (HCC)   Assessment & Plan    Patient has had an angiogram and angioplasty done tonight to the left lower extremity.  His pulses are not palpable on my exam however his foot is warm, his pain is much less, and they are the pulses are dopplerable.  Patient was evaluated by vascular surgery.    Currently has good perfusion however patient has unhealed ulcer disease.    Patient continue wound care and need LPS evaluation.    Patient is a lifelong non-smoker     Wound infection- (present on admission)   Assessment & Plan    Vascular surgery Dr. Coates is following.  Consult limb preservation service  Start patient on empiric antibiotic coverage, I continue the Rocephin and doxycycline,  Hold on imaging studies pending limb evaluation.  Wounds are very concerning.  Wound care ordered.     Hyperlipidemia- (present on admission)   Assessment & Plan    Continue statin     Uncontrolled type 2 diabetes mellitus with hyperglycemia (HCC)- (present on admission)   Assessment & Plan    Patient's most recent A1c was 12.3 implying very poor control.  He is maintained on glipizide and metformin as an outpatient.  I DCed oral DM meds for frequent n.p.o. status during the hospital stay and needs to control sugar better.  Cover with sliding scale  Start Lantus at a low dose 10 units, titrate upwards as we have a better idea of how his sugars run in house  Diabetic education  I also ordered a hypoglycemic protocol to prevent hypoglycemic event     Acute ischemic stroke (HCC)- (present on admission)   Assessment & Plan    Patient currently has no further symptoms  I continue aspirin and adding Plavix for reasons of his PAD.  I also continue patient with statin.          VTE prophylaxis: Heparin subcu.      Current Facility-Administered Medications:   •  aspirin EC (ECOTRIN) tablet  81 mg, 81 mg, Oral, DAILY, GIL Devine.O., 81 mg at 06/22/19 0600  •  atorvastatin (LIPITOR) tablet 40 mg, 40 mg, Oral, Q EVENING, GIL Devine.O., 40 mg at 06/21/19 1712  •  clopidogrel (PLAVIX) tablet 75 mg, 75 mg, Oral, DAILY, GIL Devine.O., 75 mg at 06/22/19 0553  •  senna-docusate (PERICOLACE or SENOKOT S) 8.6-50 MG per tablet 2 Tab, 2 Tab, Oral, BID **AND** polyethylene glycol/lytes (MIRALAX) PACKET 1 Packet, 1 Packet, Oral, QDAY PRN **AND** magnesium hydroxide (MILK OF MAGNESIA) suspension 30 mL, 30 mL, Oral, QDAY PRN **AND** bisacodyl (DULCOLAX) suppository 10 mg, 10 mg, Rectal, QDAY PRN, GIL Devine.O.  •  Respiratory Care per Protocol, , Nebulization, Continuous RT, GIL Devine.O.  •  acetaminophen (TYLENOL) tablet 650 mg, 650 mg, Oral, Q6HRS PRN, GIL Devine.O.  •  ondansetron (ZOFRAN) syringe/vial injection 4 mg, 4 mg, Intravenous, Q4HRS PRN, GIL Devine.O.  •  ondansetron (ZOFRAN ODT) dispertab 4 mg, 4 mg, Oral, Q4HRS PRN, GIL Devine.O.  •  promethazine (PHENERGAN) tablet 12.5-25 mg, 12.5-25 mg, Oral, Q4HRS PRN, GIL Devine.O.  •  promethazine (PHENERGAN) suppository 12.5-25 mg, 12.5-25 mg, Rectal, Q4HRS PRN, GIL Devine.O.  •  prochlorperazine (COMPAZINE) injection 5-10 mg, 5-10 mg, Intravenous, Q4HRS PRN, GIL Devine.O.  •  insulin regular (HUMULIN R) injection 2-9 Units, 2-9 Units, Subcutaneous, 4X/DAY ACHS, 2 Units at 06/22/19 1232 **AND** Accu-Chek ACHS, , , Q AC AND BEDTIME(S) **AND** NOTIFY MD and PharmD, , , Once **AND** glucose 4 g chewable tablet 16 g, 16 g, Oral, Q15 MIN PRN **AND** DEXTROSE 10% BOLUS 250 mL, 250 mL, Intravenous, Q15 MIN PRN, Tommie Greenfield D.O.  •  oxyCODONE immediate-release (ROXICODONE) tablet 5-10 mg, 5-10 mg, Oral, Q4HRS PRN, Tommie Greenfield D.O.  •  cefTRIAXone (ROCEPHIN) 1 g in  mL IVPB, 1 g,  Intravenous, Q24HRS, Tommie Greenfield D.O., Stopped at 06/21/19 1846  •  doxycycline (VIBRAMYCIN) 100 mg in  mL IVPB, 100 mg, Intravenous, Q12HRS, Tommie Greenfield D.O., Stopped at 06/22/19 0656  •  heparin injection 5,000 Units, 5,000 Units, Subcutaneous, Q8HRS, Tommie Greenfield D.O., 5,000 Units at 06/22/19 1355  •  insulin glargine (LANTUS) injection 10 Units, 10 Units, Subcutaneous, Q EVENING, Tommie Greenfield D.O.

## 2019-06-22 NOTE — PROGRESS NOTES
Progress Note    CC: f/u for left lower extremity critical limb ischemia    Interval History: 53 y.o. male who presented 6/21/2019 with left lower extremity CLI w/ significant tissue loss to the left foot.    POD #1 s/p LLE angio w/ extensive endo tibial revasc of the AT, PT, and pedal arch.    No complaints this am.      Review of Systems  Negative for chest pain or SOB  Negative for stroke/TIA    Medications  Prior to Admission Medications   Prescriptions Last Dose Informant Patient Reported? Taking?   aspirin 81 MG EC tablet   No No   Sig: Take 1 Tab by mouth every day.   atorvastatin (LIPITOR) 40 MG Tab 6/20/2019 at Unknown time  No No   Sig: Take 1 Tab by mouth every bedtime.   clarithromycin (BIAXIN) 500 MG Tab 6/20/2019 at Unknown time  Yes Yes   Sig: Take 500 mg by mouth every 12 hours.   glipiZIDE (GLUCOTROL) 5 MG Tab 6/20/2019 at Unknown time  Yes No   Sig: Take 5 mg by mouth 2 times a day.   metFORMIN ER (GLUCOPHAGE XR) 500 MG TABLET SR 24 HR 6/20/2019 at Unknown time  Yes No   Sig: Take 500 mg by mouth every day.   riFAMPin (RIFADINE) 300 MG Cap 6/20/2019 at Unknown time  Yes Yes   Sig: Take 300 mg by mouth 2 times a day.      Facility-Administered Medications: None         Physical Exam  Vitals:    06/22/19 0345   BP: 133/85   Pulse: 90   Resp: 17   Temp: 36.4 °C (97.6 °F)   SpO2: 94%       Pulse/Extremity Exam:    Femorals:        Right: palpable       Left palpable    Pedal Pulses:       Right DP monophasic       Right PT monophasic       Left DP palpable       Left PT palpable    Wounds: Dry static wounds over the L foot      General appearance: NAD, conversing appropriately  Psych: Normal affect, mood, judgement  Neuro: CN II-XII grossly intact.   Neck: full range of motion  Lungs: No inspiratory stridor or wheezing  CV: RRR  Abdomen: Soft, NT/ND  Skin: No rashes  Psych: Alert, oriented to person, place, time      Labs reviewed today:  Recent Labs      06/21/19   0715  06/22/19   0408   WBC  7.0   7.2   RBC  4.02*  3.93*   HEMOGLOBIN  11.6*  11.6*   HEMATOCRIT  37.1*  36.3*   MCV  92.3  92.4   MCH  28.9  29.5   MCHC  31.3*  32.0*   RDW  43.3  43.0   PLATELETCT  243  232   MPV  10.8  11.1     Recent Labs      06/21/19   0715  06/22/19   0408   SODIUM  140  136   POTASSIUM  4.1  4.2   CHLORIDE  104  104   CO2  25  25   GLUCOSE  103*  115*   BUN  10  12   CREATININE  0.78  0.75   CALCIUM  9.2  9.3     Recent Labs      06/21/19   0715  06/22/19   0408   GLUCOSE  103*  115*                 No results for input(s): TROPONINI in the last 72 hours.    Urinalysis:    No results found     Imaging & Data Review:  No new imaging    Assessment/Plan & Medical Decision-Making:    The patient presents with left lower extremity critical limb ischemia with extensive tissue loss to the foot. I was able to revascularize the tibials and he has inline two-vessel runoff to the foot via the AT and PT. Palpable pulses.    Awaiting wound care. Would paint the foot w/ betadine until ortho eval, Dr. Chopra, on Monday.    Cont ASA/plavix/statin    Thank you for involving us in this patient's care. Please call with questions.    Aleksander Chatterjee MD  Vascular Surgeon  [facility]  Office: [phone]

## 2019-06-22 NOTE — PROGRESS NOTES
A/Ox 4.  VSS.  Reports no pain.    LLE nonhealing wounds PADMAJA, no drainage.  +pulses bilaterally, dopplered.  Decreased sensation in LLE.    + SILVA, + baseline BLE numbness and tingling.  RA, satting >90%, denies SOB or difficulty breathing.  + normoactive BSx4, denies flatus, no BM this shift, LBM pta, denies n/v.  Tolerated DM, vegetarian diet well.  + void, QS.  Up self, tolerates activity well.  Up to BR, repositions self frequently.  PIV S/L, PO intake encouraged.    Call light within reach, calls appropriately.  Bed in lowest locked position.

## 2019-06-22 NOTE — CARE PLAN
Problem: Safety  Goal: Will remain free from falls  Outcome: PROGRESSING AS EXPECTED  Encouraged pt to call for assistance. Pt verbalizes understanding. Ambulating without assistance, demonstrates steady gait.      Problem: Infection  Goal: Will remain free from infection  Outcome: PROGRESSING AS EXPECTED  Monitoring wound site. Limb preservation to consult.   IV abx in use.

## 2019-06-22 NOTE — PROGRESS NOTES
Alert, oriented x4.   Denies pain.  R groin site PADMAJA, gauze removed by MD. CDI.  Palpable 1+ pedal pulses bilaterally.   Bilat feet warm. States tingling is present, baseline at home, decreased per report.   Tolerating diet, denies n/v. + bowel sounds, + flatus, LBM pta.  Saturating >90% on RA.  L foot wounds PADMAJA, dry. Limb preservation consult in place. Awaiting wound RNs.   , decreasing back to baseline per pt.  Updated on plan of care. Safety education provided. Bed locked in low. Call light within reach. Rounding in place.

## 2019-06-22 NOTE — H&P
Hospital Medicine History & Physical Note    Date of Service  6/21/2019    Primary Care Physician  No primary care provider on file.    Consultants  Vascular surgery  Limb preservation service    Code Status  Full    Chief Complaint  Chronic foot wound    History of Presenting Illness  53 y.o. male who presented 6/21/2019 with a chronic foot wound which he has had for about 2 months.  This developed while he was traveling in [facility].  It has been treated by wound care clinic and with antibiotics as an outpatient.  Despite this it has continued to worsen.  Patient was admitted directly today in order to have a vascular procedure done by Dr. Coates.  He had a lower extremity angiogram gram followed by dorsalis pedis angioplasty, anterior tibial artery angioplasty and arthrectomy, and a posterior tibial artery angioplasty.  The procedure was completed without apparent complication.  On my examination post procedure, the patient reports he is feeling better.  He feels like he can bear weight on his foot more comfortably.    Review of Systems  Review of Systems   Constitutional: Negative for chills and fever.   HENT: Negative for nosebleeds and sore throat.    Eyes: Negative for blurred vision and double vision.   Respiratory: Negative for cough and shortness of breath.    Cardiovascular: Negative for chest pain, palpitations and leg swelling.   Gastrointestinal: Negative for abdominal pain, diarrhea, nausea and vomiting.   Genitourinary: Negative for dysuria and urgency.   Musculoskeletal: Negative for back pain.        Chronic foot wound and pain   Skin: Negative for rash.   Neurological: Negative for dizziness, loss of consciousness and headaches.       Past Medical History  Diabetes  CVA  Dyslipidemia  Hypertension  Peripheral arterial disease    Surgical History  Left lower extremity angiogram and arthroplasty    Family History  Diabetes and heart disease    Social History   reports that he has never smoked. He has  never used smokeless tobacco. He reports that he does not drink alcohol or use drugs.    Allergies  No Known Allergies    Medications  Prior to Admission Medications   Prescriptions Last Dose Informant Patient Reported? Taking?   aspirin 81 MG EC tablet   No No   Sig: Take 1 Tab by mouth every day.   atorvastatin (LIPITOR) 40 MG Tab 6/20/2019 at Unknown time  No No   Sig: Take 1 Tab by mouth every bedtime.   clarithromycin (BIAXIN) 500 MG Tab 6/20/2019 at Unknown time  Yes Yes   Sig: Take 500 mg by mouth every 12 hours.   glipiZIDE (GLUCOTROL) 5 MG Tab 6/20/2019 at Unknown time  Yes No   Sig: Take 5 mg by mouth 2 times a day.   metFORMIN ER (GLUCOPHAGE XR) 500 MG TABLET SR 24 HR 6/20/2019 at Unknown time  Yes No   Sig: Take 500 mg by mouth every day.   riFAMPin (RIFADINE) 300 MG Cap 6/20/2019 at Unknown time  Yes Yes   Sig: Take 300 mg by mouth 2 times a day.      Facility-Administered Medications: None       Physical Exam  Temp:  [36.1 °C (97 °F)-36.6 °C (97.8 °F)] 36.6 °C (97.8 °F)  Pulse:  [80-97] 95  Resp:  [12-19] 17  BP: (128-148)/(80-88) 148/84  SpO2:  [92 %-100 %] 96 %    Physical Exam   Constitutional: He is oriented to person, place, and time. He appears well-developed and well-nourished. No distress.   HENT:   Head: Normocephalic and atraumatic.   Nose: Nose normal.   Mouth/Throat: Oropharynx is clear and moist.   Eyes: Conjunctivae are normal.   Neck: No JVD present.   Cardiovascular: Normal rate.  Exam reveals no gallop.    No murmur heard.  Pulmonary/Chest: Effort normal. No stridor. No respiratory distress. He has no wheezes. He has no rales.   Abdominal: Soft. There is no tenderness. There is no rebound and no guarding.   Musculoskeletal: He exhibits no edema.   Left foot is examined.  There is a 2 to 3 cm wound overlying the medial aspect of the first MTP, and a second wound overlying the anterior aspect of the ankle joint is somewhat small approximately 2 cm.  Both show deep tendon structures.   There is minimal surrounding erythema and no drainage.  The rest of the foot is warm and pink, capillary refill is slightly delayed but less than 3 seconds.  I am unable to palpate the pulses, but the nurse reports that they are dopplerable.   Neurological: He is alert and oriented to person, place, and time.   Skin: Skin is warm and dry. No rash noted. He is not diaphoretic.   Psychiatric: He has a normal mood and affect. Thought content normal.   Nursing note and vitals reviewed.      Laboratory:  Recent Labs      06/21/19   0715   WBC  7.0   RBC  4.02*   HEMOGLOBIN  11.6*   HEMATOCRIT  37.1*   MCV  92.3   MCH  28.9   MCHC  31.3*   RDW  43.3   PLATELETCT  243   MPV  10.8     Recent Labs      06/21/19   0715   SODIUM  140   POTASSIUM  4.1   CHLORIDE  104   CO2  25   GLUCOSE  103*   BUN  10   CREATININE  0.78   CALCIUM  9.2     Recent Labs      06/21/19   0715   GLUCOSE  103*                 No results for input(s): TROPONINI in the last 72 hours.    Urinalysis:    No results found     Imaging:  IR-EXTREMITY ANGIOGRAM-UNILATERAL LEFT    (Results Pending)         Assessment/Plan:  I anticipate this patient will require at least two midnights for appropriate medical management, necessitating inpatient admission.    Peripheral arterial disease (HCC)   Assessment & Plan    Patient has had an angiogram and angioplasty done tonight to the left lower extremity.  His pulses are not palpable on my exam however his foot is warm, his pain is much less, and they are the pulses are dopplerable.  Continue double antiplatelet therapy with Plavix and aspirin  Statin therapy  Patient is a lifelong non-smoker     Wound infection- (present on admission)   Assessment & Plan    Vascular surgery Dr. Coates is following.  Consult limb preservation service  Start patient on empiric antibiotic coverage  Hold on imaging studies pending limb evaluation.  Wounds are very concerning.     Hyperlipidemia- (present on admission)   Assessment &  Plan    Continue statin     Uncontrolled type 2 diabetes mellitus with hyperglycemia (HCC)- (present on admission)   Assessment & Plan    Patient's most recent A1c was 12.3 implying very poor control.  He is maintained on glipizide and metformin as an outpatient.  Continue glipizide, stop metformin.  Cover with sliding scale  Start Lantus at a low dose 10 units, titrate upwards as we have a better idea of how his sugars run in house  Diabetic education     Acute ischemic stroke (HCC)- (present on admission)   Assessment & Plan    Continue aspirin and adding Plavix for reasons of his PAD.  Statin         VTE prophylaxis: heparin

## 2019-06-22 NOTE — ASSESSMENT & PLAN NOTE
Vascular surgery Dr. Coates is following.  Consult limb preservation service  Foot gangrene, status post debridement, I discontinue patient's antibiotics given patient has no signs of sepsis or local signs of infection anymore.  Wound care to be continued, appreciate input.    wound debridement 6/24  Wound VAC currently in place continue wound care  Infection resolved currently off antibiotics.  Wound looks good no need for antibiotics

## 2019-06-22 NOTE — ASSESSMENT & PLAN NOTE
Patient's most recent A1c was 12.3 implying very poor control.  He is maintained on glipizide and metformin as an outpatient.  I DCed oral DM meds for frequent n.p.o. status during the hospital stay and needs to control sugar better.  Cover with sliding scale  Patient blood sugar still not controlled, I increased Lantus to 22 units, stable and better controlled  Diabetic education  I also ordered a hypoglycemic protocol to prevent hypoglycemic event    I also increase short acting insulin 3 units 3 times a day,

## 2019-06-22 NOTE — PROGRESS NOTES
Progress Note    I spoke with Dr. Chopra regarding the pt and he will consult for limb salvage on Monday. Please keep NPO Sunday evening in the event or OR on Monday.    Aleksander Chatterjee MD  Vascular Surgeon  [facility]  Office: [phone]

## 2019-06-22 NOTE — ASSESSMENT & PLAN NOTE
Patient has had an angiogram and angioplasty done tonight to the left lower extremity.  His pulses are not palpable on my exam however his foot is warm, his pain is much less, and they are the pulses are dopplerable.  Patient was evaluated by vascular surgery.    Currently has good perfusion however patient has unhealed ulcer disease.    Patient continue wound care   Appreciate LPS and orthopedics recommendation plan for surgery 6/24  Patient is a lifelong non-smoker  Discontinue antibiotics for now.  Continue wound care and wound VAC.  Patient will need wound VAC and wound clinic as outpatient.  Currently still pending wound VAC and wound clinic to be set up,  to help    Patient's wound VAC is examined by LPS 6/28.  Recommend continue monitor, and wound VAC will be changed 6/29.  And recommend to continue monitor until next Tuesday.  Wound looks good no need for antibiotics  Possible change wound vac again tmr and if still good, may go home soon with outpatient wound follow up.

## 2019-06-23 LAB
GLUCOSE BLD-MCNC: 145 MG/DL (ref 65–99)
GLUCOSE BLD-MCNC: 205 MG/DL (ref 65–99)
GLUCOSE BLD-MCNC: 296 MG/DL (ref 65–99)

## 2019-06-23 PROCEDURE — A9270 NON-COVERED ITEM OR SERVICE: HCPCS | Performed by: INTERNAL MEDICINE

## 2019-06-23 PROCEDURE — 700102 HCHG RX REV CODE 250 W/ 637 OVERRIDE(OP): Performed by: INTERNAL MEDICINE

## 2019-06-23 PROCEDURE — 770006 HCHG ROOM/CARE - MED/SURG/GYN SEMI*

## 2019-06-23 PROCEDURE — 700111 HCHG RX REV CODE 636 W/ 250 OVERRIDE (IP): Performed by: HOSPITALIST

## 2019-06-23 PROCEDURE — 99233 SBSQ HOSP IP/OBS HIGH 50: CPT | Performed by: INTERNAL MEDICINE

## 2019-06-23 PROCEDURE — 700102 HCHG RX REV CODE 250 W/ 637 OVERRIDE(OP): Performed by: HOSPITALIST

## 2019-06-23 PROCEDURE — 700105 HCHG RX REV CODE 258: Performed by: HOSPITALIST

## 2019-06-23 PROCEDURE — A9270 NON-COVERED ITEM OR SERVICE: HCPCS | Performed by: HOSPITALIST

## 2019-06-23 PROCEDURE — 82962 GLUCOSE BLOOD TEST: CPT | Mod: 91

## 2019-06-23 RX ORDER — INSULIN GLARGINE 100 [IU]/ML
15 INJECTION, SOLUTION SUBCUTANEOUS EVERY EVENING
Status: DISCONTINUED | OUTPATIENT
Start: 2019-06-23 | End: 2019-06-24

## 2019-06-23 RX ADMIN — CEFTRIAXONE SODIUM 1 G: 1 INJECTION, POWDER, FOR SOLUTION INTRAMUSCULAR; INTRAVENOUS at 17:36

## 2019-06-23 RX ADMIN — CLOPIDOGREL BISULFATE 75 MG: 75 TABLET ORAL at 05:44

## 2019-06-23 RX ADMIN — HEPARIN SODIUM 5000 UNITS: 5000 INJECTION, SOLUTION INTRAVENOUS; SUBCUTANEOUS at 14:50

## 2019-06-23 RX ADMIN — HEPARIN SODIUM 5000 UNITS: 5000 INJECTION, SOLUTION INTRAVENOUS; SUBCUTANEOUS at 05:44

## 2019-06-23 RX ADMIN — INSULIN HUMAN 3 UNITS: 100 INJECTION, SOLUTION PARENTERAL at 12:25

## 2019-06-23 RX ADMIN — INSULIN GLARGINE 15 UNITS: 100 INJECTION, SOLUTION SUBCUTANEOUS at 17:41

## 2019-06-23 RX ADMIN — HEPARIN SODIUM 5000 UNITS: 5000 INJECTION, SOLUTION INTRAVENOUS; SUBCUTANEOUS at 22:12

## 2019-06-23 RX ADMIN — ASPIRIN 81 MG: 81 TABLET, COATED ORAL at 05:44

## 2019-06-23 RX ADMIN — DOXYCYCLINE 100 MG: 100 TABLET, FILM COATED ORAL at 05:44

## 2019-06-23 RX ADMIN — DOXYCYCLINE 100 MG: 100 TABLET, FILM COATED ORAL at 17:37

## 2019-06-23 RX ADMIN — ATORVASTATIN CALCIUM 40 MG: 40 TABLET, FILM COATED ORAL at 17:36

## 2019-06-23 ASSESSMENT — ENCOUNTER SYMPTOMS
FALLS: 0
NAUSEA: 0
EYE PAIN: 0
FEVER: 0
DOUBLE VISION: 0
DEPRESSION: 0
HEADACHES: 0
WEIGHT LOSS: 0
ABDOMINAL PAIN: 0
CONSTIPATION: 0
WHEEZING: 0
BLURRED VISION: 0
TREMORS: 0
WEAKNESS: 0
SPUTUM PRODUCTION: 0
PND: 0
NECK PAIN: 0
PALPITATIONS: 0
COUGH: 0
SHORTNESS OF BREATH: 0
BACK PAIN: 0
HEARTBURN: 0
DIZZINESS: 0

## 2019-06-23 ASSESSMENT — LIFESTYLE VARIABLES: SUBSTANCE_ABUSE: 0

## 2019-06-23 NOTE — CARE PLAN
Problem: Safety  Goal: Will remain free from injury  Pt upself with steady gait. Call light within reach. Calls for assistance as needed.     Problem: Knowledge Deficit  Goal: Knowledge of disease process/condition, treatment plan, diagnostic tests, and medications will improve  Pt instructed on pain management, IS, ambulation, and call light

## 2019-06-23 NOTE — WOUND TEAM
Wound team covering for LPS saw pt today.  Left foot wounds are desicated, with detached edges, significant discoloration of tissue - mostly dull black, brown, yellow.  Foot is malodorous.  Looks similar to last picture in EPIC.  Will continue to paint with betadine.  Pt to be seen by Dr Chopra on Monday 6/24/19 and has orders for NPO 6/23/19 evening.

## 2019-06-23 NOTE — WOUND TEAM
Wound team for LPS - dropped off heel float boot and instructed pt and his wife that he should wear it when he is in bed to protect his foot.  Pt understood.  Although pt is mobile he is at increased risk for Viktor due to diminished blood flow.

## 2019-06-23 NOTE — PROGRESS NOTES
Report received.  Assumed Care.  Pt in bed, 403 2. AOx4, responds appropriately.  Denies pain, SOB.  Plan of care discussed.  Explained importance of calling before getting OOB and pt verbalizes understanding.  Call light and belongings within reach, treaded slipper socks on, bed alarm in use, bed in lowest position.  Will monitor frequently.

## 2019-06-23 NOTE — PROGRESS NOTES
Pt AA&Ox4. No c/o pain, n/v or SOB. Tingling to BLE which is baseline. Left foot wound quentin and dry. Right groin site quentin with small scab. 1+ palpable pedal pulses bilaterally. Pt up with steady gait. Tolerating diet. +BS, +BM. Plan of care discussed. Hourly rounding in place. Call light within reach. /81   Pulse 88   Temp 36.5 °C (97.7 °F) (Temporal)   Resp 16   Ht 1.829 m (6')   Wt 76.2 kg (167 lb 15.9 oz)   SpO2 97%

## 2019-06-23 NOTE — PROGRESS NOTES
Progress Note    CC: f/u for left lower extremity critical limb ischemia    Interval History: 53 y.o. male who presented 6/21/2019 with left lower extremity CLI w/ significant tissue loss to the left foot.    POD #2 s/p LLE angio w/ extensive endo tibial revasc of the AT, PT, and pedal arch.    No complaints this am.      Review of Systems  Negative for weakness  Negative for SOB    Medications  Prior to Admission Medications   Prescriptions Last Dose Informant Patient Reported? Taking?   aspirin 81 MG EC tablet   No No   Sig: Take 1 Tab by mouth every day.   atorvastatin (LIPITOR) 40 MG Tab 6/20/2019 at Unknown time  No No   Sig: Take 1 Tab by mouth every bedtime.   clarithromycin (BIAXIN) 500 MG Tab 6/20/2019 at Unknown time  Yes Yes   Sig: Take 500 mg by mouth every 12 hours.   glipiZIDE (GLUCOTROL) 5 MG Tab 6/20/2019 at Unknown time  Yes No   Sig: Take 5 mg by mouth 2 times a day.   metFORMIN ER (GLUCOPHAGE XR) 500 MG TABLET SR 24 HR 6/20/2019 at Unknown time  Yes No   Sig: Take 500 mg by mouth every day.   riFAMPin (RIFADINE) 300 MG Cap 6/20/2019 at Unknown time  Yes Yes   Sig: Take 300 mg by mouth 2 times a day.      Facility-Administered Medications: None         Physical Exam  Vitals:    06/23/19 0350   BP: 122/81   Pulse: 88   Resp: 16   Temp: 36.5 °C (97.7 °F)   SpO2: 97%       Pulse/Extremity Exam:    Femorals:        Right: palpable       Left palpable    Pedal Pulses:       L DP/PT palpable    Wounds: Dry static wounds over the L foot      General appearance: NAD, conversing appropriately  Psych: Normal affect, mood, judgement  Neuro: CN II-XII grossly intact.   Neck: full range of motion  Lungs: No inspiratory stridor or wheezing  CV: RRR  Abdomen: Soft, NT/ND  Skin: No rashes  Psych: Alert, oriented to person, place, time      Labs reviewed today:  Recent Labs      06/21/19   0715  06/22/19   0408   WBC  7.0  7.2   RBC  4.02*  3.93*   HEMOGLOBIN  11.6*  11.6*   HEMATOCRIT  37.1*  36.3*   MCV  92.3   92.4   MCH  28.9  29.5   MCHC  31.3*  32.0*   RDW  43.3  43.0   PLATELETCT  243  232   MPV  10.8  11.1     Recent Labs      06/21/19   0715  06/22/19   0408   SODIUM  140  136   POTASSIUM  4.1  4.2   CHLORIDE  104  104   CO2  25  25   GLUCOSE  103*  115*   BUN  10  12   CREATININE  0.78  0.75   CALCIUM  9.2  9.3     Recent Labs      06/21/19   0715  06/22/19   0408   GLUCOSE  103*  115*                 No results for input(s): TROPONINI in the last 72 hours.    Urinalysis:    No results found     Imaging & Data Review:  No new imaging    Assessment/Plan & Medical Decision-Making:    The patient presented with left lower extremity critical limb ischemia with extensive tissue loss to the foot. His AT, PT, and pedal arch are revascularized and he has palpable pulses.    Needs wound care. Would paint the foot w/ betadine until ortho eval, Dr. Chopra, on Monday.    Cont ASA/plavix/statin    Thank you for involving me in this patient's care. Please call with questions.    Aleksander Chatterjee MD  Vascular Surgeon  [facility]  Office: [phone]

## 2019-06-23 NOTE — PROGRESS NOTES
[facility] Medicine Daily Progress Note    Date of Service  6/23/2019    Chief Complaint  53 y.o. male admitted 6/21/2019 with complains of left foot and heel ulcer    Hospital Course    Past medical history of PAD, diabetes uncontrolled, presented with complains of left foot and heel ulcer.  Patient was evaluated by vascular surgery and underwent angiogram and angioplasty.  Patient was put on antiplatelet therapy understanding.  Patient was also put on insulin for diabetes control.  Patient will have orthopedics and LPS evaluation.        Interval Problem Update  6/22.  Patient has been stable and comfortable overnight but still complains of left foot pain. Patient's pain is local, 6-8,/10, intermittent and does not radiate to other location, sharp and with some tingling. Can be controlled by pain meds. Dressing in place.  6/23. p patient still complains of lower extremity wound pain.  But pain can be controlled. Patient's pain is local 5-6/10, intermittent and does not radiate to other location, sharp and with some tingling. Can be controlled by pain meds. Dressing in place.  Still pending LPS recommendation.        Consultants/Specialty  Vascular surgery, LPS, orthopedics    Code Status  Full code    Disposition  To be determined    Review of Systems  Review of Systems   Constitutional: Negative for fever and weight loss.   HENT: Negative for congestion, ear discharge, ear pain and hearing loss.    Eyes: Negative for blurred vision, double vision and pain.   Respiratory: Negative for cough, sputum production, shortness of breath and wheezing.    Cardiovascular: Negative for chest pain, palpitations, leg swelling and PND.   Gastrointestinal: Negative for abdominal pain, constipation, heartburn and nausea.   Genitourinary: Negative for dysuria, frequency and hematuria.   Musculoskeletal: Positive for joint pain. Negative for back pain, falls and neck pain.   Skin: Negative for rash.        Left foot unhealed ulcer    Neurological: Negative for dizziness, tremors, weakness and headaches.   Psychiatric/Behavioral: Negative for depression, substance abuse and suicidal ideas.        Physical Exam  Temp:  [36.1 °C (97 °F)-36.5 °C (97.7 °F)] 36.2 °C (97.2 °F)  Pulse:  [87-95] 87  Resp:  [16-20] 16  BP: (121-129)/(77-84) 121/77  SpO2:  [93 %-98 %] 96 %    Physical Exam   Constitutional: He is oriented to person, place, and time. He appears well-developed and well-nourished. No distress.   HENT:   Head: Normocephalic.   Eyes: Pupils are equal, round, and reactive to light. EOM are normal.   Neck: Normal range of motion. Neck supple. No tracheal deviation present. No thyromegaly present.   Cardiovascular: Normal rate, regular rhythm and normal heart sounds.  Exam reveals no gallop and no friction rub.    No murmur heard.  Pulmonary/Chest: Effort normal and breath sounds normal. No respiratory distress. He has no wheezes. He exhibits no tenderness.   Abdominal: Soft. Bowel sounds are normal. He exhibits no distension and no mass. There is no rebound and no guarding.   Musculoskeletal: Normal range of motion. He exhibits no edema.   Lymphadenopathy:     He has no cervical adenopathy.   Neurological: He is alert and oriented to person, place, and time. He displays normal reflexes. No cranial nerve deficit.   Skin: Skin is dry. No rash noted. He is not diaphoretic. There is erythema.   Left foot and heel ulcer with surrounding erythema   Psychiatric: He has a normal mood and affect.   Nursing note and vitals reviewed.      Fluids    Intake/Output Summary (Last 24 hours) at 06/23/19 1625  Last data filed at 06/23/19 0754   Gross per 24 hour   Intake              830 ml   Output                0 ml   Net              830 ml       Laboratory  Recent Labs      06/21/19   0715  06/22/19   0408   WBC  7.0  7.2   RBC  4.02*  3.93*   HEMOGLOBIN  11.6*  11.6*   HEMATOCRIT  37.1*  36.3*   MCV  92.3  92.4   MCH  28.9  29.5   MCHC  31.3*  32.0*   RDW   43.3  43.0   PLATELETCT  243  232   MPV  10.8  11.1     Recent Labs      06/21/19   0715  06/22/19   0408   SODIUM  140  136   POTASSIUM  4.1  4.2   CHLORIDE  104  104   CO2  25  25   GLUCOSE  103*  115*   BUN  10  12   CREATININE  0.78  0.75   CALCIUM  9.2  9.3                   Imaging  IR-EXTREMITY ANGIOGRAM-UNILATERAL LEFT    (Results Pending)        Assessment/Plan  * Peripheral arterial disease (HCC)   Assessment & Plan    Patient has had an angiogram and angioplasty done tonight to the left lower extremity.  His pulses are not palpable on my exam however his foot is warm, his pain is much less, and they are the pulses are dopplerable.  Patient was evaluated by vascular surgery.    Currently has good perfusion however patient has unhealed ulcer disease.    Patient continue wound care and pending LPS evaluation and orthopedics evaluation.    Patient is a lifelong non-smoker  Continue antibiotics for now.  Continue wound care.     Wound infection- (present on admission)   Assessment & Plan    Vascular surgery Dr. Coates is following.  Consult limb preservation service  Start patient on empiric antibiotic coverage, I continue the Rocephin and doxycycline,  Hold on imaging studies pending limb evaluation.  Wounds are very concerning.  Wound care to be continued, appreciate input.  Pending further recommendation by orthopedics and LPS.     Hyperlipidemia- (present on admission)   Assessment & Plan    Continue statin     Uncontrolled type 2 diabetes mellitus with hyperglycemia (HCC)- (present on admission)   Assessment & Plan    Patient's most recent A1c was 12.3 implying very poor control.  He is maintained on glipizide and metformin as an outpatient.  I DCed oral DM meds for frequent n.p.o. status during the hospital stay and needs to control sugar better.  Cover with sliding scale  Patient blood sugar still not controlled, I increased Lantus to 15 units, titrate upwards as we have a better idea of how his sugars  run in house  Diabetic education  I also ordered a hypoglycemic protocol to prevent hypoglycemic event     Acute ischemic stroke (HCC)- (present on admission)   Assessment & Plan    Patient currently has no further symptoms  I continue aspirin and adding Plavix for reasons of his PAD.  I also continue patient with statin.          VTE prophylaxis: Heparin subcu.      Current Facility-Administered Medications:   •  doxycycline monohydrate (ADOXA) tablet 100 mg, 100 mg, Oral, Q12HRS, Judy Henry M.D., 100 mg at 06/23/19 0544  •  aspirin EC (ECOTRIN) tablet 81 mg, 81 mg, Oral, DAILY, Tommie Greenfield D.O., 81 mg at 06/23/19 0544  •  atorvastatin (LIPITOR) tablet 40 mg, 40 mg, Oral, Q EVENING, Tommie Greenfield D.O., 40 mg at 06/22/19 1718  •  clopidogrel (PLAVIX) tablet 75 mg, 75 mg, Oral, DAILY, Tommie Greenfield D.O., 75 mg at 06/23/19 0544  •  senna-docusate (PERICOLACE or SENOKOT S) 8.6-50 MG per tablet 2 Tab, 2 Tab, Oral, BID **AND** polyethylene glycol/lytes (MIRALAX) PACKET 1 Packet, 1 Packet, Oral, QDAY PRN **AND** magnesium hydroxide (MILK OF MAGNESIA) suspension 30 mL, 30 mL, Oral, QDAY PRN **AND** bisacodyl (DULCOLAX) suppository 10 mg, 10 mg, Rectal, QDAY PRN, Tommie Greenfield D.O.  •  Respiratory Care per Protocol, , Nebulization, Continuous RT, Tommie Greenfield D.O.  •  acetaminophen (TYLENOL) tablet 650 mg, 650 mg, Oral, Q6HRS PRN, Tommie Greenfield D.O.  •  ondansetron (ZOFRAN) syringe/vial injection 4 mg, 4 mg, Intravenous, Q4HRS PRN, Tommie Greenfield D.O.  •  ondansetron (ZOFRAN ODT) dispertab 4 mg, 4 mg, Oral, Q4HRS PRN, Tommie Greenfield D.O.  •  promethazine (PHENERGAN) tablet 12.5-25 mg, 12.5-25 mg, Oral, Q4HRS PRN, Tommie Greenfield D.O.  •  promethazine (PHENERGAN) suppository 12.5-25 mg, 12.5-25 mg, Rectal, Q4HRS PRN, Tommie Greenfield D.O.  •  prochlorperazine (COMPAZINE) injection 5-10 mg, 5-10 mg, Intravenous, Q4HRS PRN, Tommie  TAHIR Greenfield.  •  insulin regular (HUMULIN R) injection 2-9 Units, 2-9 Units, Subcutaneous, 4X/DAY ACHS, 3 Units at 06/23/19 1225 **AND** Accu-Chek ACHS, , , Q AC AND BEDTIME(S) **AND** NOTIFY MD and PharmD, , , Once **AND** glucose 4 g chewable tablet 16 g, 16 g, Oral, Q15 MIN PRN **AND** DEXTROSE 10% BOLUS 250 mL, 250 mL, Intravenous, Q15 MIN PRN, Tommie Greenfield D.O.  •  oxyCODONE immediate-release (ROXICODONE) tablet 5-10 mg, 5-10 mg, Oral, Q4HRS PRN, Tommie Greenfield D.O.  •  cefTRIAXone (ROCEPHIN) 1 g in  mL IVPB, 1 g, Intravenous, Q24HRS, Tommie Greenfield D.O., Stopped at 06/22/19 1749  •  heparin injection 5,000 Units, 5,000 Units, Subcutaneous, Q8HRS, Tommie Greenfield D.O., 5,000 Units at 06/23/19 1450  •  insulin glargine (LANTUS) injection 10 Units, 10 Units, Subcutaneous, Q EVENING, Tommie Greenfield D.O., 10 Units at 06/22/19 1721

## 2019-06-24 ENCOUNTER — ANESTHESIA (OUTPATIENT)
Dept: SURGERY | Facility: MEDICAL CENTER | Age: 54
DRG: 271 | End: 2019-06-24
Payer: COMMERCIAL

## 2019-06-24 ENCOUNTER — ANESTHESIA EVENT (OUTPATIENT)
Dept: SURGERY | Facility: MEDICAL CENTER | Age: 54
DRG: 271 | End: 2019-06-24
Payer: COMMERCIAL

## 2019-06-24 LAB
GLUCOSE BLD-MCNC: 135 MG/DL (ref 65–99)
GLUCOSE BLD-MCNC: 142 MG/DL (ref 65–99)
GLUCOSE BLD-MCNC: 143 MG/DL (ref 65–99)
GLUCOSE BLD-MCNC: 148 MG/DL (ref 65–99)
GLUCOSE BLD-MCNC: 253 MG/DL (ref 65–99)

## 2019-06-24 PROCEDURE — 700105 HCHG RX REV CODE 258

## 2019-06-24 PROCEDURE — 700102 HCHG RX REV CODE 250 W/ 637 OVERRIDE(OP): Performed by: HOSPITALIST

## 2019-06-24 PROCEDURE — 501330 HCHG SET, CYSTO IRRIG TUBING: Performed by: ORTHOPAEDIC SURGERY

## 2019-06-24 PROCEDURE — 160035 HCHG PACU - 1ST 60 MINS PHASE I: Performed by: ORTHOPAEDIC SURGERY

## 2019-06-24 PROCEDURE — 93010 ELECTROCARDIOGRAM REPORT: CPT | Performed by: INTERNAL MEDICINE

## 2019-06-24 PROCEDURE — 500881 HCHG PACK, EXTREMITY: Performed by: ORTHOPAEDIC SURGERY

## 2019-06-24 PROCEDURE — A9270 NON-COVERED ITEM OR SERVICE: HCPCS | Performed by: HOSPITALIST

## 2019-06-24 PROCEDURE — 160038 HCHG SURGERY MINUTES - EA ADDL 1 MIN LEVEL 2: Performed by: ORTHOPAEDIC SURGERY

## 2019-06-24 PROCEDURE — 700101 HCHG RX REV CODE 250: Performed by: ORTHOPAEDIC SURGERY

## 2019-06-24 PROCEDURE — 82962 GLUCOSE BLOOD TEST: CPT | Mod: 91

## 2019-06-24 PROCEDURE — 160009 HCHG ANES TIME/MIN: Performed by: ORTHOPAEDIC SURGERY

## 2019-06-24 PROCEDURE — 700102 HCHG RX REV CODE 250 W/ 637 OVERRIDE(OP): Performed by: ANESTHESIOLOGY

## 2019-06-24 PROCEDURE — 0KBW0ZZ EXCISION OF LEFT FOOT MUSCLE, OPEN APPROACH: ICD-10-PCS | Performed by: ORTHOPAEDIC SURGERY

## 2019-06-24 PROCEDURE — 700105 HCHG RX REV CODE 258: Performed by: HOSPITALIST

## 2019-06-24 PROCEDURE — 160048 HCHG OR STATISTICAL LEVEL 1-5: Performed by: ORTHOPAEDIC SURGERY

## 2019-06-24 PROCEDURE — 93005 ELECTROCARDIOGRAM TRACING: CPT | Performed by: ORTHOPAEDIC SURGERY

## 2019-06-24 PROCEDURE — 700102 HCHG RX REV CODE 250 W/ 637 OVERRIDE(OP): Performed by: INTERNAL MEDICINE

## 2019-06-24 PROCEDURE — A9270 NON-COVERED ITEM OR SERVICE: HCPCS | Performed by: INTERNAL MEDICINE

## 2019-06-24 PROCEDURE — 99232 SBSQ HOSP IP/OBS MODERATE 35: CPT | Performed by: INTERNAL MEDICINE

## 2019-06-24 PROCEDURE — 501488 HCHG SUCTION CANN, WOUNDVAC TRAC: Performed by: ORTHOPAEDIC SURGERY

## 2019-06-24 PROCEDURE — A6454 SELF-ADHER BAND W>=3" <5"/YD: HCPCS | Performed by: ORTHOPAEDIC SURGERY

## 2019-06-24 PROCEDURE — 700101 HCHG RX REV CODE 250: Performed by: ANESTHESIOLOGY

## 2019-06-24 PROCEDURE — 500452 HCHG DRESSING, WOUND VAC MED.: Performed by: ORTHOPAEDIC SURGERY

## 2019-06-24 PROCEDURE — 770006 HCHG ROOM/CARE - MED/SURG/GYN SEMI*

## 2019-06-24 PROCEDURE — A9270 NON-COVERED ITEM OR SERVICE: HCPCS | Performed by: ANESTHESIOLOGY

## 2019-06-24 PROCEDURE — 700111 HCHG RX REV CODE 636 W/ 250 OVERRIDE (IP): Performed by: ANESTHESIOLOGY

## 2019-06-24 PROCEDURE — 501838 HCHG SUTURE GENERAL: Performed by: ORTHOPAEDIC SURGERY

## 2019-06-24 PROCEDURE — 700111 HCHG RX REV CODE 636 W/ 250 OVERRIDE (IP): Performed by: HOSPITALIST

## 2019-06-24 PROCEDURE — 700105 HCHG RX REV CODE 258: Performed by: ANESTHESIOLOGY

## 2019-06-24 PROCEDURE — 160027 HCHG SURGERY MINUTES - 1ST 30 MINS LEVEL 2: Performed by: ORTHOPAEDIC SURGERY

## 2019-06-24 PROCEDURE — A6223 GAUZE >16<=48 NO W/SAL W/O B: HCPCS | Performed by: ORTHOPAEDIC SURGERY

## 2019-06-24 PROCEDURE — 160002 HCHG RECOVERY MINUTES (STAT): Performed by: ORTHOPAEDIC SURGERY

## 2019-06-24 DEVICE — IMPLANTABLE DEVICE: Type: IMPLANTABLE DEVICE | Status: FUNCTIONAL

## 2019-06-24 RX ORDER — METOPROLOL TARTRATE 1 MG/ML
1 INJECTION, SOLUTION INTRAVENOUS
Status: DISCONTINUED | OUTPATIENT
Start: 2019-06-24 | End: 2019-06-24 | Stop reason: HOSPADM

## 2019-06-24 RX ORDER — OXYCODONE HCL 5 MG/5 ML
5 SOLUTION, ORAL ORAL
Status: COMPLETED | OUTPATIENT
Start: 2019-06-24 | End: 2019-06-24

## 2019-06-24 RX ORDER — INSULIN GLARGINE 100 [IU]/ML
20 INJECTION, SOLUTION SUBCUTANEOUS EVERY EVENING
Status: DISCONTINUED | OUTPATIENT
Start: 2019-06-24 | End: 2019-06-29

## 2019-06-24 RX ORDER — HYDROMORPHONE HYDROCHLORIDE 1 MG/ML
0.2 INJECTION, SOLUTION INTRAMUSCULAR; INTRAVENOUS; SUBCUTANEOUS
Status: DISCONTINUED | OUTPATIENT
Start: 2019-06-24 | End: 2019-06-24 | Stop reason: HOSPADM

## 2019-06-24 RX ORDER — DIPHENHYDRAMINE HYDROCHLORIDE 50 MG/ML
12.5 INJECTION INTRAMUSCULAR; INTRAVENOUS
Status: DISCONTINUED | OUTPATIENT
Start: 2019-06-24 | End: 2019-06-24 | Stop reason: HOSPADM

## 2019-06-24 RX ORDER — ONDANSETRON 2 MG/ML
INJECTION INTRAMUSCULAR; INTRAVENOUS PRN
Status: DISCONTINUED | OUTPATIENT
Start: 2019-06-24 | End: 2019-06-24 | Stop reason: SURG

## 2019-06-24 RX ORDER — HYDROMORPHONE HYDROCHLORIDE 1 MG/ML
0.4 INJECTION, SOLUTION INTRAMUSCULAR; INTRAVENOUS; SUBCUTANEOUS
Status: DISCONTINUED | OUTPATIENT
Start: 2019-06-24 | End: 2019-06-24 | Stop reason: HOSPADM

## 2019-06-24 RX ORDER — MEPERIDINE HYDROCHLORIDE 25 MG/ML
12.5 INJECTION INTRAMUSCULAR; INTRAVENOUS; SUBCUTANEOUS
Status: DISCONTINUED | OUTPATIENT
Start: 2019-06-24 | End: 2019-06-24 | Stop reason: HOSPADM

## 2019-06-24 RX ORDER — HYDROMORPHONE HYDROCHLORIDE 1 MG/ML
0.6 INJECTION, SOLUTION INTRAMUSCULAR; INTRAVENOUS; SUBCUTANEOUS
Status: DISCONTINUED | OUTPATIENT
Start: 2019-06-24 | End: 2019-06-24 | Stop reason: HOSPADM

## 2019-06-24 RX ORDER — HYDRALAZINE HYDROCHLORIDE 20 MG/ML
5 INJECTION INTRAMUSCULAR; INTRAVENOUS
Status: DISCONTINUED | OUTPATIENT
Start: 2019-06-24 | End: 2019-06-24 | Stop reason: HOSPADM

## 2019-06-24 RX ORDER — SODIUM CHLORIDE, SODIUM LACTATE, POTASSIUM CHLORIDE, CALCIUM CHLORIDE 600; 310; 30; 20 MG/100ML; MG/100ML; MG/100ML; MG/100ML
INJECTION, SOLUTION INTRAVENOUS
Status: COMPLETED
Start: 2019-06-24 | End: 2019-06-24

## 2019-06-24 RX ORDER — OXYCODONE HCL 5 MG/5 ML
10 SOLUTION, ORAL ORAL
Status: COMPLETED | OUTPATIENT
Start: 2019-06-24 | End: 2019-06-24

## 2019-06-24 RX ORDER — SODIUM CHLORIDE, SODIUM LACTATE, POTASSIUM CHLORIDE, CALCIUM CHLORIDE 600; 310; 30; 20 MG/100ML; MG/100ML; MG/100ML; MG/100ML
INJECTION, SOLUTION INTRAVENOUS CONTINUOUS
Status: DISCONTINUED | OUTPATIENT
Start: 2019-06-24 | End: 2019-06-24 | Stop reason: HOSPADM

## 2019-06-24 RX ORDER — MAGNESIUM HYDROXIDE 1200 MG/15ML
LIQUID ORAL
Status: COMPLETED | OUTPATIENT
Start: 2019-06-24 | End: 2019-06-24

## 2019-06-24 RX ORDER — BUPIVACAINE HYDROCHLORIDE 5 MG/ML
INJECTION, SOLUTION EPIDURAL; INTRACAUDAL
Status: DISCONTINUED | OUTPATIENT
Start: 2019-06-24 | End: 2019-06-24 | Stop reason: HOSPADM

## 2019-06-24 RX ORDER — ONDANSETRON 2 MG/ML
4 INJECTION INTRAMUSCULAR; INTRAVENOUS
Status: DISCONTINUED | OUTPATIENT
Start: 2019-06-24 | End: 2019-06-24 | Stop reason: HOSPADM

## 2019-06-24 RX ORDER — SODIUM CHLORIDE, SODIUM LACTATE, POTASSIUM CHLORIDE, CALCIUM CHLORIDE 600; 310; 30; 20 MG/100ML; MG/100ML; MG/100ML; MG/100ML
INJECTION, SOLUTION INTRAVENOUS
Status: DISCONTINUED | OUTPATIENT
Start: 2019-06-24 | End: 2019-06-24 | Stop reason: SURG

## 2019-06-24 RX ORDER — CEFAZOLIN SODIUM 1 G/3ML
INJECTION, POWDER, FOR SOLUTION INTRAMUSCULAR; INTRAVENOUS PRN
Status: DISCONTINUED | OUTPATIENT
Start: 2019-06-24 | End: 2019-06-24 | Stop reason: SURG

## 2019-06-24 RX ORDER — HALOPERIDOL 5 MG/ML
1 INJECTION INTRAMUSCULAR
Status: DISCONTINUED | OUTPATIENT
Start: 2019-06-24 | End: 2019-06-24 | Stop reason: HOSPADM

## 2019-06-24 RX ADMIN — CEFAZOLIN 2 G: 330 INJECTION, POWDER, FOR SOLUTION INTRAMUSCULAR; INTRAVENOUS at 19:19

## 2019-06-24 RX ADMIN — SODIUM CHLORIDE, POTASSIUM CHLORIDE, SODIUM LACTATE AND CALCIUM CHLORIDE 1000 ML: 600; 310; 30; 20 INJECTION, SOLUTION INTRAVENOUS at 21:14

## 2019-06-24 RX ADMIN — DOXYCYCLINE 100 MG: 100 TABLET, FILM COATED ORAL at 21:42

## 2019-06-24 RX ADMIN — CEFTRIAXONE SODIUM 1 G: 1 INJECTION, POWDER, FOR SOLUTION INTRAMUSCULAR; INTRAVENOUS at 17:35

## 2019-06-24 RX ADMIN — OXYCODONE HYDROCHLORIDE 10 MG: 5 SOLUTION ORAL at 20:21

## 2019-06-24 RX ADMIN — PROPOFOL 200 MG: 10 INJECTION, EMULSION INTRAVENOUS at 19:19

## 2019-06-24 RX ADMIN — ONDANSETRON 4 MG: 2 INJECTION INTRAMUSCULAR; INTRAVENOUS at 19:50

## 2019-06-24 RX ADMIN — FENTANYL CITRATE 100 MCG: 50 INJECTION, SOLUTION INTRAMUSCULAR; INTRAVENOUS at 19:19

## 2019-06-24 RX ADMIN — SODIUM CHLORIDE, POTASSIUM CHLORIDE, SODIUM LACTATE AND CALCIUM CHLORIDE: 600; 310; 30; 20 INJECTION, SOLUTION INTRAVENOUS at 19:19

## 2019-06-24 RX ADMIN — ATORVASTATIN CALCIUM 40 MG: 40 TABLET, FILM COATED ORAL at 21:42

## 2019-06-24 ASSESSMENT — ENCOUNTER SYMPTOMS
WHEEZING: 0
NECK PAIN: 0
BACK PAIN: 0
NAUSEA: 0
HEARTBURN: 0
FALLS: 0
TREMORS: 0
WEIGHT LOSS: 0
SHORTNESS OF BREATH: 0
CONSTIPATION: 0
CHILLS: 0
COUGH: 0
DIZZINESS: 0
DOUBLE VISION: 0
PND: 0
DEPRESSION: 0
DIAPHORESIS: 0
BLURRED VISION: 0
SPUTUM PRODUCTION: 0
PALPITATIONS: 0
DIARRHEA: 0
EYE PAIN: 0

## 2019-06-24 ASSESSMENT — LIFESTYLE VARIABLES: SUBSTANCE_ABUSE: 0

## 2019-06-24 ASSESSMENT — PAIN SCALES - GENERAL: PAIN_LEVEL: 2

## 2019-06-24 NOTE — PROGRESS NOTES
Pt AA&Ox4. No c/o pain, n/v or SOB. Tingling to BLE which is baseline. Left foot wound quentin and dry. Right groin site quentin with small scab. 1+ palpable pedal pulses bilaterally. Pt up with steady gait. NPO at this time. +BS, +BM. Plan of care discussed. Hourly rounding in place. Call light within reach. /84   Pulse 84   Temp 36.2 °C (97.1 °F) (Temporal)   Resp 16   Ht 1.829 m (6')   Wt 76.2 kg (167 lb 15.9 oz)   SpO2 95%

## 2019-06-24 NOTE — DISCHARGE PLANNING
Anticipated Discharge Disposition: TBD    Action: RN GHADA assessed pt at bedside. Pt reports he lives in a single story home in [place], [place].  Pt employed and states he is independent with all ADLs and IADLs. Pt states he has prescription coverage and no financial barriers.  Unclear who his PCP is; does not see anyone regularly, but states he has seen Dr. Antony and Dr. Mj Degroot (?) in the past.  Unable to validate address or phone number of these physicians. Pt anticipates being discharged home.  Unclear at this time whether this will be the case.     Barriers to Discharge: Medical clearance    Plan: Await medical clearance for discharge.  MD and RN, kindly update CM for discharge planning needs.     Care Transition Team Assessment    Information Source  Orientation : Oriented x 4  Information Given By: Patient  Who is responsible for making decisions for patient? : Patient    Readmission Evaluation  Is this a readmission?: No    Elopement Risk  Legal Hold: No  Ambulatory or Self Mobile in Wheelchair: Yes  Disoriented: No  Psychiatric Symptoms: None  History of Wandering: No  Elopement this Admit: No  Vocalizing Wanting to Leave: No  Displays Behaviors, Body Language Wanting to Leave: No-Not at Risk for Elopement  Elopement Risk: Not at Risk for Elopement    Interdisciplinary Discharge Planning  Does Admitting Nurse Feel This Could be a Complex Discharge?: No  Primary Care Physician: Dr. Antony  Lives with - Patient's Self Care Capacity: Spouse  Patient or legal guardian wants to designate a caregiver (see row info): No  Support Systems: None  Do You Take your Prescribed Medications Regularly: Yes  Able to Return to Previous ADL's: Yes  Mobility Issues: No  Prior Services: None  Patient Expects to be Discharged to:: Home  Assistance Needed: No (wound care)  Durable Medical Equipment: Not Applicable    Discharge Preparedness  What is your plan after discharge?: Home with help  What are your discharge supports?:  Spouse  Prior Functional Level: Ambulatory, Drives Self, Independent with Activities of Daily Living, Independent with Medication Management  Difficulity with ADLs: None  Difficulity with IADLs: None    Functional Assesment  Prior Functional Level: Ambulatory, Drives Self, Independent with Activities of Daily Living, Independent with Medication Management    Finances  Financial Barriers to Discharge: No  Prescription Coverage: Yes    Vision / Hearing Impairment  Vision Impairment : Yes  Right Eye Vision: Wears Glasses  Left Eye Vision: Wears Glasses  Hearing Impairment : No         Advance Directive  Advance Directive?: None    Domestic Abuse  Have you ever been the victim of abuse or violence?: No  Physical Abuse or Sexual Abuse: No  Verbal Abuse or Emotional Abuse: No  Possible Abuse Reported to:: Not Applicable         Discharge Risks or Barriers  Discharge risks or barriers?: No PCP  Patient risk factors: No PCP    Anticipated Discharge Information  Anticipated discharge disposition: Discharge needs currently unknown  [facility]  Discharge Contact Phone Number: [phone]

## 2019-06-24 NOTE — PROGRESS NOTES
[facility] Medicine Daily Progress Note    Date of Service  6/24/2019    Chief Complaint  53 y.o. male admitted 6/21/2019 with complains of left foot and heel ulcer    Hospital Course    Past medical history of PAD, diabetes uncontrolled, presented with complains of left foot and heel ulcer.  Patient was evaluated by vascular surgery and underwent angiogram and angioplasty.  Patient was put on antiplatelet therapy understanding.  Patient was also put on insulin for diabetes control.  Patient will have orthopedics and LPS evaluation.        Interval Problem Update  6/22.  Patient has been stable and comfortable overnight but still complains of left foot pain. Patient's pain is local, 6-8,/10, intermittent and does not radiate to other location, sharp and with some tingling. Can be controlled by pain meds. Dressing in place.  6/23. p patient still complains of lower extremity wound pain.  But pain can be controlled. Patient's pain is local 5-6/10, intermittent and does not radiate to other location, sharp and with some tingling. Can be controlled by pain meds. Dressing in place.  Still pending LPS recommendation.  6/24. Patient has been pleasant during the hospital stay and no significant overnight event.  Patient complains of lower extremity pain.  Patient was evaluated by orthopedics and will have surgery debridement this afternoon. Patient's pain is local 4-5/10, intermittent and does not radiate to other location, sharp and with some tingling. Can be controlled by pain meds. Dressing in place.    Consultants/Specialty  Vascular surgery, LPS, orthopedics    Code Status  Full code    Disposition  To be determined    Review of Systems  Review of Systems   Constitutional: Negative for chills, diaphoresis and weight loss.   HENT: Negative for congestion, ear discharge, ear pain and hearing loss.    Eyes: Negative for blurred vision, double vision and pain.   Respiratory: Negative for cough, sputum production, shortness  of breath and wheezing.    Cardiovascular: Negative for chest pain, palpitations, leg swelling and PND.   Gastrointestinal: Negative for constipation, diarrhea, heartburn and nausea.   Genitourinary: Negative for dysuria, frequency and hematuria.   Musculoskeletal: Positive for joint pain. Negative for back pain, falls and neck pain.   Skin: Negative for rash.        Left foot unhealed ulcer   Neurological: Negative for dizziness and tremors.   Psychiatric/Behavioral: Negative for depression, substance abuse and suicidal ideas.        Physical Exam  Temp:  [36.2 °C (97.1 °F)-36.4 °C (97.5 °F)] 36.2 °C (97.1 °F)  Pulse:  [84-94] 89  Resp:  [16-17] 17  BP: (128-132)/(83-88) 131/83  SpO2:  [95 %-99 %] 95 %    Physical Exam   Constitutional: He is oriented to person, place, and time. He appears well-nourished. No distress.   HENT:   Head: Normocephalic.   Eyes: Pupils are equal, round, and reactive to light. EOM are normal.   Neck: Normal range of motion. Neck supple. No thyromegaly present.   Cardiovascular: Normal rate, regular rhythm and normal heart sounds.  Exam reveals no gallop and no friction rub.    No murmur heard.  Pulmonary/Chest: Effort normal and breath sounds normal. No respiratory distress. He exhibits no tenderness.   Abdominal: Soft. Bowel sounds are normal. He exhibits no distension and no mass. There is no rebound.   Musculoskeletal: Normal range of motion. He exhibits no edema.   Lymphadenopathy:     He has no cervical adenopathy.   Neurological: He is alert and oriented to person, place, and time. He displays normal reflexes. No cranial nerve deficit.   Skin: Skin is dry. No rash noted. There is erythema.   Left foot and heel ulcer with surrounding erythema   Psychiatric: He has a normal mood and affect.   Nursing note and vitals reviewed.      Fluids    Intake/Output Summary (Last 24 hours) at 06/24/19 1636  Last data filed at 06/24/19 0400   Gross per 24 hour   Intake              600 ml   Output                 0 ml   Net              600 ml       Laboratory  Recent Labs      06/22/19   0408   WBC  7.2   RBC  3.93*   HEMOGLOBIN  11.6*   HEMATOCRIT  36.3*   MCV  92.4   MCH  29.5   MCHC  32.0*   RDW  43.0   PLATELETCT  232   MPV  11.1     Recent Labs      06/22/19   0408   SODIUM  136   POTASSIUM  4.2   CHLORIDE  104   CO2  25   GLUCOSE  115*   BUN  12   CREATININE  0.75   CALCIUM  9.3                   Imaging  IR-EXTREMITY ANGIOGRAM-UNILATERAL LEFT    (Results Pending)        Assessment/Plan  * Peripheral arterial disease (HCC)   Assessment & Plan    Patient has had an angiogram and angioplasty done tonight to the left lower extremity.  His pulses are not palpable on my exam however his foot is warm, his pain is much less, and they are the pulses are dopplerable.  Patient was evaluated by vascular surgery.    Currently has good perfusion however patient has unhealed ulcer disease.    Patient continue wound care   Appreciate LPS and orthopedics recommendation plan for surgery 6/24  Patient is a lifelong non-smoker  Continue antibiotics for now.  Continue wound care.     Wound infection- (present on admission)   Assessment & Plan    Vascular surgery Dr. Coates is following.  Consult limb preservation service  Start patient on empiric antibiotic coverage, I continue the Rocephin and doxycycline,  Hold on imaging studies pending limb evaluation.  Wounds are very concerning.  Wound care to be continued, appreciate input.    Plan for wound debridement 6/24     Hyperlipidemia- (present on admission)   Assessment & Plan    Continue statin     Uncontrolled type 2 diabetes mellitus with hyperglycemia (HCC)- (present on admission)   Assessment & Plan    Patient's most recent A1c was 12.3 implying very poor control.  He is maintained on glipizide and metformin as an outpatient.  I DCed oral DM meds for frequent n.p.o. status during the hospital stay and needs to control sugar better.  Cover with sliding scale  Patient  blood sugar still not controlled, I increased Lantus to 20 units, titrate upwards as we have a better idea of how his sugars run in house  Diabetic education  I also ordered a hypoglycemic protocol to prevent hypoglycemic event     Acute ischemic stroke (HCC)- (present on admission)   Assessment & Plan    Patient currently has no further symptoms  I continue aspirin and adding Plavix for reasons of his PAD.  I also continue patient with statin.          VTE prophylaxis: Heparin subcu.      Current Facility-Administered Medications:   •  insulin glargine (LANTUS) injection 20 Units, 20 Units, Subcutaneous, Q EVENING, Judy Henry M.D.  •  insulin regular (HUMULIN R) injection 2-9 Units, 2-9 Units, Subcutaneous, TID AC, Stopped at 06/24/19 0824 **AND** Accu-Chek ACHS, , , Q AC AND BEDTIME(S) **AND** NOTIFY MD and PharmD, , , Once **AND** glucose 4 g chewable tablet 16 g, 16 g, Oral, Q15 MIN PRN **AND** DEXTROSE 10% BOLUS 250 mL, 250 mL, Intravenous, Q15 MIN PRN, Judy Henry M.D.  •  doxycycline monohydrate (ADOXA) tablet 100 mg, 100 mg, Oral, Q12HRS, Judy Henry M.D., Stopped at 06/24/19 0600  •  aspirin EC (ECOTRIN) tablet 81 mg, 81 mg, Oral, DAILY, Tommie Greenfeild D.O., Stopped at 06/24/19 0600  •  atorvastatin (LIPITOR) tablet 40 mg, 40 mg, Oral, Q EVENING, Tommie Greenfield D.O., 40 mg at 06/23/19 1736  •  clopidogrel (PLAVIX) tablet 75 mg, 75 mg, Oral, DAILY, Tommie Greenfield D.O., Stopped at 06/24/19 0600  •  senna-docusate (PERICOLACE or SENOKOT S) 8.6-50 MG per tablet 2 Tab, 2 Tab, Oral, BID **AND** polyethylene glycol/lytes (MIRALAX) PACKET 1 Packet, 1 Packet, Oral, QDAY PRN **AND** magnesium hydroxide (MILK OF MAGNESIA) suspension 30 mL, 30 mL, Oral, QDAY PRN **AND** bisacodyl (DULCOLAX) suppository 10 mg, 10 mg, Rectal, QDAY PRN, Tommie Greenfield D.O.  •  Respiratory Care per Protocol, , Nebulization, Continuous RT, LUCÍA DevineO.  •  acetaminophen (TYLENOL) tablet 650 mg,  650 mg, Oral, Q6HRS PRN, GIL Devine.O.  •  ondansetron (ZOFRAN) syringe/vial injection 4 mg, 4 mg, Intravenous, Q4HRS PRN, GIL Devine.O.  •  ondansetron (ZOFRAN ODT) dispertab 4 mg, 4 mg, Oral, Q4HRS PRN, GIL Devine.O.  •  promethazine (PHENERGAN) tablet 12.5-25 mg, 12.5-25 mg, Oral, Q4HRS PRN, GIL Devine.O.  •  promethazine (PHENERGAN) suppository 12.5-25 mg, 12.5-25 mg, Rectal, Q4HRS PRN, GIL Devine.O.  •  prochlorperazine (COMPAZINE) injection 5-10 mg, 5-10 mg, Intravenous, Q4HRS PRN, GIL Devine.O.  •  oxyCODONE immediate-release (ROXICODONE) tablet 5-10 mg, 5-10 mg, Oral, Q4HRS PRN, GIL Devine.O.  •  cefTRIAXone (ROCEPHIN) 1 g in  mL IVPB, 1 g, Intravenous, Q24HRS, LUCÍA DevineOMert, Stopped at 06/23/19 1806  •  heparin injection 5,000 Units, 5,000 Units, Subcutaneous, Q8HRS, Tommie Greenfield D.O., Stopped at 06/24/19 0600

## 2019-06-24 NOTE — PROGRESS NOTES
"    Progress Note    CC: f/u for left lower extremity critical limb ischemia    Interval History: 53 y.o. male who presented 6/21/2019 with left lower extremity CLI w/ significant tissue loss to the left foot.    POD #3 s/p LLE angio w/ extensive endo tibial revasc of the AT, PT, and pedal arch.    Evaluated by ortho this am w/ plan for debridement later today.    Pt has no complaints currently. Was up walking the halls. States the foot feels \"90% better.\"      Review of Systems  Negative for weakness  Negative for SOB    Medications  Prior to Admission Medications   Prescriptions Last Dose Informant Patient Reported? Taking?   aspirin 81 MG EC tablet   No No   Sig: Take 1 Tab by mouth every day.   atorvastatin (LIPITOR) 40 MG Tab 6/20/2019 at Unknown time  No No   Sig: Take 1 Tab by mouth every bedtime.   clarithromycin (BIAXIN) 500 MG Tab 6/20/2019 at Unknown time  Yes Yes   Sig: Take 500 mg by mouth every 12 hours.   glipiZIDE (GLUCOTROL) 5 MG Tab 6/20/2019 at Unknown time  Yes No   Sig: Take 5 mg by mouth 2 times a day.   metFORMIN ER (GLUCOPHAGE XR) 500 MG TABLET SR 24 HR 6/20/2019 at Unknown time  Yes No   Sig: Take 500 mg by mouth every day.   riFAMPin (RIFADINE) 300 MG Cap 6/20/2019 at Unknown time  Yes Yes   Sig: Take 300 mg by mouth 2 times a day.      Facility-Administered Medications: None         Physical Exam  Vitals:    06/24/19 0750   BP: 131/83   Pulse: 89   Resp: 17   Temp:    SpO2: 95%       Pulse/Extremity Exam:    Femorals:        Right: palpable       Left palpable    Pedal Pulses:       L DP/PT palpable    Wounds: Dry static wounds over the L foot      General appearance: NAD, conversing appropriately  Psych: Normal affect, mood, judgement  Neuro: CN II-XII grossly intact.   Neck: full range of motion  Lungs: No inspiratory stridor or wheezing  CV: RRR  Abdomen: Soft, NT/ND  Skin: No rashes  Psych: Alert, oriented to person, place, time      Labs reviewed today:  Recent Labs      06/22/19   " 0408   WBC  7.2   RBC  3.93*   HEMOGLOBIN  11.6*   HEMATOCRIT  36.3*   MCV  92.4   MCH  29.5   MCHC  32.0*   RDW  43.0   PLATELETCT  232   MPV  11.1     Recent Labs      06/22/19   0408   SODIUM  136   POTASSIUM  4.2   CHLORIDE  104   CO2  25   GLUCOSE  115*   BUN  12   CREATININE  0.75   CALCIUM  9.3     Recent Labs      06/22/19   0408   GLUCOSE  115*                 No results for input(s): TROPONINI in the last 72 hours.    Urinalysis:    No results found     Imaging & Data Review:  No new imaging    Assessment/Plan & Medical Decision-Making:  The patient presented with left lower extremity critical limb ischemia with extensive tissue loss to the foot. His AT, PT, and pedal arch are revascularized and he has palpable pulses.    Pt does not seem to understand the severity of his wounds or risk for limb loss. He is under the impression that the large, dry, static ulcers w/ exposed fascia are doing fine and healing. I explained to him that his wounds are severe and are not currently in a state of active healing, and that he will need debridement.    Will defer remainder of wound management to ortho and wound care and plan on seeing him back in clinic in 2-3 weeks. Will get his post-op surveillance ABIs and arterial duplex at [facility] at that point.    Cont ASA/plavix/statin    Thank you for involving me in this patient's care. Please call with questions.    Aleksander Chatterjee MD  Vascular Surgeon  [facility]  Office: [phone]

## 2019-06-24 NOTE — WOUND TEAM
Wound consult received, patient already seen by wound team 6/22. Dr. Staton at bedside today. Patient to OR this evening with Keysha for left foot I & D. Please re-consult as needed. LPS is following.

## 2019-06-24 NOTE — CARE PLAN
Problem: Safety  Goal: Will remain free from injury  Pt with steady gait. Call light within reach. Calls for assistance as needed.     Problem: Knowledge Deficit  Goal: Knowledge of disease process/condition, treatment plan, diagnostic tests, and medications will improve  Pt instructed on ambulation, pain management, IS and call light

## 2019-06-24 NOTE — PROGRESS NOTES
Ortho progress:  Consult note to be dicated    Left foot vascular wounds over foot. Pulse 1+  53M with left foot vascular wounds sp revasc  -Plan to take for InD and possible biologics today  -NPO      Rancho Staton MD  [facility]  Foot and Ankle Fellow  [phone]

## 2019-06-25 LAB
EKG IMPRESSION: NORMAL
GLUCOSE BLD-MCNC: 219 MG/DL (ref 65–99)
GLUCOSE BLD-MCNC: 227 MG/DL (ref 65–99)
GLUCOSE BLD-MCNC: 237 MG/DL (ref 65–99)

## 2019-06-25 PROCEDURE — 700102 HCHG RX REV CODE 250 W/ 637 OVERRIDE(OP): Performed by: INTERNAL MEDICINE

## 2019-06-25 PROCEDURE — A9270 NON-COVERED ITEM OR SERVICE: HCPCS | Performed by: HOSPITALIST

## 2019-06-25 PROCEDURE — 82962 GLUCOSE BLOOD TEST: CPT | Mod: 91

## 2019-06-25 PROCEDURE — A9270 NON-COVERED ITEM OR SERVICE: HCPCS | Performed by: INTERNAL MEDICINE

## 2019-06-25 PROCEDURE — 99233 SBSQ HOSP IP/OBS HIGH 50: CPT | Performed by: INTERNAL MEDICINE

## 2019-06-25 PROCEDURE — 700102 HCHG RX REV CODE 250 W/ 637 OVERRIDE(OP): Performed by: HOSPITALIST

## 2019-06-25 PROCEDURE — 700111 HCHG RX REV CODE 636 W/ 250 OVERRIDE (IP): Performed by: HOSPITALIST

## 2019-06-25 PROCEDURE — 770006 HCHG ROOM/CARE - MED/SURG/GYN SEMI*

## 2019-06-25 RX ADMIN — CLOPIDOGREL BISULFATE 75 MG: 75 TABLET ORAL at 05:55

## 2019-06-25 RX ADMIN — ATORVASTATIN CALCIUM 40 MG: 40 TABLET, FILM COATED ORAL at 17:22

## 2019-06-25 RX ADMIN — INSULIN GLARGINE 20 UNITS: 100 INJECTION, SOLUTION SUBCUTANEOUS at 17:24

## 2019-06-25 RX ADMIN — OXYCODONE HYDROCHLORIDE 5 MG: 5 TABLET ORAL at 05:55

## 2019-06-25 RX ADMIN — OXYCODONE HYDROCHLORIDE 10 MG: 5 TABLET ORAL at 21:41

## 2019-06-25 RX ADMIN — DOXYCYCLINE 100 MG: 100 TABLET, FILM COATED ORAL at 05:55

## 2019-06-25 RX ADMIN — OXYCODONE HYDROCHLORIDE 5 MG: 5 TABLET ORAL at 17:29

## 2019-06-25 RX ADMIN — HEPARIN SODIUM 5000 UNITS: 5000 INJECTION, SOLUTION INTRAVENOUS; SUBCUTANEOUS at 05:55

## 2019-06-25 RX ADMIN — ASPIRIN 81 MG: 81 TABLET, COATED ORAL at 05:55

## 2019-06-25 RX ADMIN — HEPARIN SODIUM 5000 UNITS: 5000 INJECTION, SOLUTION INTRAVENOUS; SUBCUTANEOUS at 14:54

## 2019-06-25 RX ADMIN — OXYCODONE HYDROCHLORIDE 5 MG: 5 TABLET ORAL at 12:10

## 2019-06-25 RX ADMIN — OXYCODONE HYDROCHLORIDE 5 MG: 5 TABLET ORAL at 01:44

## 2019-06-25 RX ADMIN — HEPARIN SODIUM 5000 UNITS: 5000 INJECTION, SOLUTION INTRAVENOUS; SUBCUTANEOUS at 21:30

## 2019-06-25 ASSESSMENT — ENCOUNTER SYMPTOMS
COUGH: 0
SPUTUM PRODUCTION: 0
SENSORY CHANGE: 0
WHEEZING: 0
DOUBLE VISION: 0
NECK PAIN: 0
DIZZINESS: 0
HEARTBURN: 0
PND: 0
DIAPHORESIS: 0
WEIGHT LOSS: 0
BLURRED VISION: 0
CONSTIPATION: 0
DEPRESSION: 0
NAUSEA: 0
EYE PAIN: 0
PALPITATIONS: 0
DIARRHEA: 0
FALLS: 0
CHILLS: 0

## 2019-06-25 ASSESSMENT — LIFESTYLE VARIABLES: SUBSTANCE_ABUSE: 0

## 2019-06-25 NOTE — ANESTHESIA POSTPROCEDURE EVALUATION
Patient: Israel Aviles    Procedure Summary     Date:  06/24/19 Room / Location:  [place] [address] / SURGERY [facility]    Anesthesia Start:  1919 Anesthesia Stop:  1956    Procedure:  IRRIGATION AND DEBRIDEMENT, WOUND-FOOT, BIOLOGIC PLACEMENT, WOUND VAC PLACEMENT (Left Foot) Diagnosis:  (Peripherial vascular disease, dried ganegreen left foot)    Surgeon:  Charles Chopra M.D. Responsible Provider:  Curry Julian M.D.    Anesthesia Type:  general ASA Status:  3          Final Anesthesia Type: general  Last vitals  BP   Blood Pressure: (!) 161/96    Temp   35.8 °C (96.5 °F)    Pulse   Pulse: 95, Heart Rate (Monitored): 94   Resp   18    SpO2   98 %      Anesthesia Post Evaluation    Patient location during evaluation: PACU  Patient participation: complete - patient participated  Level of consciousness: awake and alert  Pain score: 2    Airway patency: patent  Anesthetic complications: no  Cardiovascular status: hemodynamically stable  Respiratory status: acceptable  Hydration status: euvolemic    PONV: none           Nurse Pain Score: 0 (NPRS)

## 2019-06-25 NOTE — OP REPORT
DATE OF SERVICE:  06/24/2019    PREOPERATIVE DIAGNOSES:  1.  Peripheral vascular disease.  2.  Left foot gangrene.    POSTOPERATIVE DIAGNOSES:  1.  Peripheral vascular disease.  2.  Left foot gangrene.    PROCEDURES PERFORMED:  1.  Left foot irrigation and debridement, multiple compartments, including   anterior foot and toes.  2.  Left placement of skin substitute.  3.  Left placement of wound VAC.    SURGEON:  Charles Chopra MD    FIRST ASSISTANT:  Rancho Staton MD    SECOND ASSISTANT:  Consuelo Booker    ANESTHESIA:  General endotracheal.    ESTIMATED BLOOD LOSS:  None.    COMPLICATIONS:  None.    TOURNIQUET:  None.    POSTOPERATIVE PLAN:  1.  Wound VAC.  2.  Probable return to the operating room for below-knee amputation.    INDICATIONS:  The patient is a 53-year-old male.  He has peripheral vascular   disease.  He had a revascularization procedure.  The above diagnoses made and   options were discussed with him including operative and nonoperative.  He   elected to undergo operative intervention.  The above procedure was discussed   and all questions were answered.  Risks of surgery were explained, which   include but not limited to wound problems, infection, nerve injury, vascular   injury, need for further surgery.  They understand there is a high risk for   ultimately amputation.  They understand and accept these risks, and agreed to   proceed.  His site was marked by myself prior to receiving psychotropic   medicines.    PROCEDURE IN DETAIL:  The patient was brought to the operating room.  He   underwent general endotracheal anesthesia without complications.  Left lower   extremity was prepped and draped in standard fashion in supine position.    Positive site verification confirmed his right lower extremity as well as the   above procedure and confirmation that he received preoperative antibiotics.    No tourniquet was utilized.  Using a 15 blade, sharp excisional debridement   was performed of  necrotic skin down to the level of bone over his foot.  This   measured 6x5x0.8 cm deep.  A second satellite lesion at his ankle was   debrided.  This was 2x2x0.8 cm deep.  This was necrotic tissue that went down   to tendon and fascia.  He had a third lesion over the tip of his second toe   which was debrided 2x1x0.5 cm.  All skin was necrotic and a 15 blade was used   for this excisional debridement.  Thorough irrigation was performed on all   wounds.  All skin beds appeared to be reasonably viable.  He did have some   bleeding around the periphery following the excision.  A 6x16 EpiFix was   placed in combination of the wounds over the ankle, foot and toe.  Due to the   depth they were double stacked at the ankle and the foot.  A negative pressure   incisional wound VAC was then placed over all the wounds, had positive seal.    He was transferred to the recovery room in good condition.    Utilization of Dr. Staton was necessary for patient positioning, holding,   retracting, wound closure, and dressing placement.  He was present throughout   the entire procedure.       ____________________________________     MD RIAN ROMAN / FLORECITA    DD:  06/24/2019 20:18:24  DT:  06/24/2019 22:28:32    D#:  [id number]  Job#:  [id number]

## 2019-06-25 NOTE — ANESTHESIA PROCEDURE NOTES
Airway  Date/Time: 6/24/2019 7:23 PM  Performed by: LACI VILLALBA  Authorized by: LACI VILLALBA     Location:  OR  Urgency:  Elective  Indications for Airway Management:  Anesthesia  Spontaneous Ventilation: absent    Sedation Level:  Deep  Preoxygenated: Yes    Final Airway Type:  Supraglottic airway  Final Supraglottic Airway:  Standard LMA  SGA Size:  4  Number of Attempts at Approach:  1

## 2019-06-25 NOTE — ANESTHESIA PREPROCEDURE EVALUATION
Relevant Problems   (+) Acute ischemic stroke (HCC)     Past Medical History:   Diagnosis Date   • Diabetes (HCC)        Physical Exam    Airway   Mallampati: II  TM distance: >3 FB  Neck ROM: full       Cardiovascular - normal exam  Rhythm: regular  Rate: normal  (-) murmur     Dental - normal exam         Pulmonary - normal exam  Breath sounds clear to auscultation     Abdominal    Neurological - normal exam                 Anesthesia Plan    ASA 3   ASA physical status 3 criteria: CVA or TIA - history (> 3 months) and diabetes - poorly controlled    Plan - general       Airway plan will be LMA        Induction: intravenous    Postoperative Plan: Postoperative administration of opioids is intended.    Pertinent diagnostic labs and testing reviewed    Informed Consent:    Anesthetic plan and risks discussed with patient.    Use of blood products discussed with: patient whom consented to blood products.

## 2019-06-25 NOTE — ANESTHESIA TIME REPORT
Anesthesia Start and Stop Event Times     Date Time Event    6/24/2019 1919 Anesthesia Start     1956 Anesthesia Stop        Responsible Staff  06/24/19    Name Role Begin End    Curry Julian M.D. Anesth 1919 1956        Preop Diagnosis (Free Text):  Pre-op Diagnosis     Peripherial vascular disease, dried ganegreen left foot        Preop Diagnosis (Codes):  Diagnosis Information     Diagnosis Code(s):         Post op Diagnosis  Gangrene of left foot (HCC)      Premium Reason  A. 3PM - 7AM    Comments: Premium Eliel

## 2019-06-25 NOTE — CARE PLAN
Problem: Safety  Goal: Will remain free from injury  Updated about POC. Reinforce call light use. Pt acknowledged understanding    Problem: Infection  Goal: Will remain free from infection  Iv and po abx continued    Problem: Venous Thromboembolism (VTW)/Deep Vein Thrombosis (DVT) Prevention:  Goal: Patient will participate in Venous Thrombosis (VTE)/Deep Vein Thrombosis (DVT)Prevention Measures  SCDs in place

## 2019-06-25 NOTE — PROGRESS NOTES
POST-OP NOTE FOR LIMB PRESERVATION SERVICE    SURGERY DATE: 6/24/2019    PROCEDURE: Procedure(s):  IRRIGATION AND DEBRIDEMENT, WOUND-FOOT, BIOLOGIC PLACEMENT, WOUND VAC PLACEMENT - Wound Class: Dirty or Infected    WEIGHT BEARING STATUS: Weight bearing as tolerated and Heel weight bearing    PT CONSULT: No     ANTIBIOTICS: Continue current ABX    PLAN TO RETURN TO O.R.:Possible    WOUND CARE PLAN: Wound Vac  3 x week    FOLLOW-UP: OP Wound Clinic    DURABLE MEDICAL EQUIPMENT: None    OTHER:       Rancho Staton M.D.

## 2019-06-25 NOTE — CONSULTS
DATE OF SERVICE:  06/24/2019    CONSULTING PHYSICIAN:  Aleksander Chatterjee MD    REASON FOR CONSULTATION:  Left foot gangrene.    HISTORY OF PRESENT ILLNESS:  The patient is a 53-year-old male.  He presented   to the emergency room with a chronic foot wound that he states he had for   about 2 months; throw back when he was traveling to [place].  He did present to   wound care and was placed on antibiotics.  There were recommendations for   vascular workup.  He is admitted to the hospital after undergoing a vascular   procedure by Dr. Coates.  He is happy with the revascularization.  He was   consulted for limb preservation.  The patient is having some pain.  Things   seem to be tolerable.  Denies any fevers, chills, numbness or tingling.  He   feels that things are getting worse prior to admission.    Review of systems, past medical history, surgical history, family history,   social history, allergies, and medications are reviewed on Dr. Tommie REZA and RAMIREZ dated on ____.    PHYSICAL EXAMINATION:  VITAL SIGNS:  Afebrile, vital signs are stable.  GENERAL:  Pleasant male, in no acute distress.  PSYCHIATRIC:  Pleasant demeanor.  Normal affect.  EYES:  Pupils are equal and round.  RESPIRATIONS:  Regular, unlabored breathing.  CARDIOVASCULAR:  He does have some weak, but palpable pulses in his foot.  NEUROLOGIC:  His foot is warm.  SKIN:  Shows dry gangrene over his anterior ankle, medial foot as well as over   his dorsal aspect of the second toe.  There is mild surrounding erythema.  He   has no fluctuance.  MUSCULOSKELETAL:  He has good alignment, has good range of motion.  He has no   gross instability.  He has satisfactory muscle strength.  The patient is   nonambulatory.    LABORATORY STUDIES:  Reviewed, demonstrated a white count of 7.2 and glucose   of 115.    IMAGING STUDIES:  Foot x-ray from 05/17/2019 were reviewed as well as   radiology reports.  He has no obvious osteomyelitis.    IMPRESSION:  1.   Peripheral vascular disease.  2.  Diabetes.  3.  Dry gangrene on his foot.    PLAN:  I discussed with the patient and his family the options, including   operative and nonoperative.  They wanted to proceed with operative   intervention.  We will schedule this next available.  I discussed I and D with   excision and placement of biologic dressing and wound VAC.       ____________________________________     MD RIAN ROMAN / FLORECITA    DD:  06/24/2019 20:22:23  DT:  06/24/2019 23:09:04    D#:  [id number]  Job#:  [id number]    cc: [facility]

## 2019-06-25 NOTE — CARE PLAN
Problem: Venous Thromboembolism (VTW)/Deep Vein Thrombosis (DVT) Prevention:  Goal: Patient will participate in Venous Thrombosis (VTE)/Deep Vein Thrombosis (DVT)Prevention Measures  SCDs in place.     Problem: Pain Management  Goal: Pain level will decrease to patient's comfort goal  Pain controlled with oral pain meds

## 2019-06-25 NOTE — PROGRESS NOTES
Pt arrived from PACU around 2100. A&ox 4  Family at bedside.   Tolerated diet  Denies pain  Left foot wound vac in place. Some numbness.   wbat or heel WB.   O2 on RA  No void yet  abx continued  Call light within reach. Hourly rounding in place

## 2019-06-25 NOTE — ANESTHESIA QCDR
2019 [facility] Clinical Data Registry (for Quality Improvement)     Postoperative nausea/vomiting risk protocol (Adult = 18 yrs and Pediatric 3-17 yrs)- (430 and 463)  General inhalation anesthetic (NOT TIVA) with PONV risk factors: Yes  Provision of anti-emetic therapy with at least 2 different classes of agents: Yes   Patient DID NOT receive anti-emetic therapy and reason is documented in Medical Record:  N/A    Multimodal Pain Management- (AQI59)  Patient undergoing Elective Surgery (i.e. Outpatient, or ASC, or Prescheduled Surgery prior to Hospital Admission): Yes  Use of Multimodal Pain Management, two or more drugs and/or interventions, NOT including systemic opioids: Yes   Exception: Documented allergy to multiple classes of analgesics:  N/A    PACU assessment of acute postoperative pain prior to Anesthesia Care End- Applies to Patients Age = 18- (ABG7)  Initial PACU pain score is which of the following: < 7/10  Patient unable to report pain score: N/A    Post-anesthetic transfer of care checklist/protocol to PACU/ICU- (426 and 427)  Upon conclusion of case, patient transferred to which of the following locations: PACU/Non-ICU  Use of transfer checklist/protocol:   Exclusion: Service Performed in Patient Hospital Room (and thus did not require transfer):     PACU Reintubation- (AQI31)  General anesthesia requiring endotracheal intubation (ETT) along with subsequent extubation in OR or PACU: Yes  Required reintubation in the PACU: No   Extubation was a planned trial documented in the medical record prior to removal of the original airway device:  N/A    Unplanned admission to ICU related to anesthesia service up through end of PACU care- (MD51)  Unplanned admission to ICU (not initially anticipated at anesthesia start time): No

## 2019-06-25 NOTE — CONSULTS
Diabetes Nurse Specialist:  Patient with known type 2 diabetes presented to hospital with a chronic foot wound of greater than 2 months.    Patient states that he generally checks his blood sugars one time per day at home, usually fasting and states most of the time they are in the lower 100 range.  He is on Glipizide 5 mg bid and Metformin  500 mg daily.   His current A1c is 12.3, although he states that his last one several months ago was around 7.2.  We discussed the importance of foot care, checking feet on daily basis.   Discussed how stress (infection, hospitalization, etc) can raise blood sugars and the importance of good control in order for his foot to heal properly.    He is currently on lantus 20 units in the hospital and Regular insulin at meals (sliding scale).   I did review insulin and how to give injection utilizing an insulin pen in the event he is discharged on insulin.   For any further needs please call [phone] for Diabetes Nurse Specialist

## 2019-06-25 NOTE — PROGRESS NOTES
Pt AA&Ox4. Pain rating at 2, denies pain intervention at this time. No c/o n/v or SOB. Tingling to BLE which is baseline. Left foot wound with wound vac and wrapped in ace wrap. No drainage noted to dressing. Heel touch weight baring to left foot.  Right groin site quentin with small scab. 1+ palpable pedal pulse to right foot. Pt up with standby assist and FWW. steady gait. Tolerating diabetic diet. +BS, +BM. Plan of care discussed. Hourly rounding in place. Call light within reach. /85   Pulse 92   Temp 37.7 °C (99.9 °F) (Temporal)   Resp 17   Ht 1.829 m (6')   Wt 76.2 kg (167 lb 15.9 oz)   SpO2 97%

## 2019-06-25 NOTE — DOCUMENTATION QUERY
[facility]                                                                       Query Response Note      PATIENT:               TRELL CASTLE  ACCT #:                  [id number]  MRN:                     [id number]  :                      1965  ADMIT DATE:       2019 6:13 AM  DISCH DATE:          RESPONDING  PROVIDER #:        [id number]           QUERY TEXT:    Per the Medical Record there appears to be conflicting information. It is unclear if the patient has a previous history of CVA or a current acute ischemic stroke. Please clarify the status of this condition:    NOTE: If an appropriate response is not listed below, please respond with a new note.    The patient's Clinical Indicators include:  Per H&P  PMHx: CVA  acute ischemic stroke  -Continue aspirin and adding Plavix for reasons of his PAD.  -Statin    Per Progress Notes:   Acute ischemic stroke  -Patient currently has no further symptoms    Treatment:   ASA, Plavix, & Lipitor. No imaging current to this admission.    Risk Factors:  DM2 with hyperglycemia, peripheral arterial disease, hyperlipidemia, & unhealed left foot ulcer    Query created by: Leti Head on 2019 3:06 PM    RESPONSE TEXT:    Unable to determine          Electronically signed by:  LAURITA LING MD 2019 12:24 PM

## 2019-06-26 ENCOUNTER — APPOINTMENT (OUTPATIENT)
Dept: WOUND CARE | Facility: MEDICAL CENTER | Age: 54
End: 2019-06-26
Attending: PHYSICIAN ASSISTANT
Payer: COMMERCIAL

## 2019-06-26 LAB
ANION GAP SERPL CALC-SCNC: 6 MMOL/L (ref 0–11.9)
BASOPHILS # BLD AUTO: 0.3 % (ref 0–1.8)
BASOPHILS # BLD: 0.02 K/UL (ref 0–0.12)
BUN SERPL-MCNC: 13 MG/DL (ref 8–22)
CALCIUM SERPL-MCNC: 9.3 MG/DL (ref 8.5–10.5)
CHLORIDE SERPL-SCNC: 102 MMOL/L (ref 96–112)
CO2 SERPL-SCNC: 26 MMOL/L (ref 20–33)
CREAT SERPL-MCNC: 0.81 MG/DL (ref 0.5–1.4)
EOSINOPHIL # BLD AUTO: 0.13 K/UL (ref 0–0.51)
EOSINOPHIL NFR BLD: 2 % (ref 0–6.9)
ERYTHROCYTE [DISTWIDTH] IN BLOOD BY AUTOMATED COUNT: 42.1 FL (ref 35.9–50)
GLUCOSE BLD-MCNC: 201 MG/DL (ref 65–99)
GLUCOSE BLD-MCNC: 206 MG/DL (ref 65–99)
GLUCOSE BLD-MCNC: 315 MG/DL (ref 65–99)
GLUCOSE SERPL-MCNC: 170 MG/DL (ref 65–99)
HCT VFR BLD AUTO: 36.8 % (ref 42–52)
HGB BLD-MCNC: 12 G/DL (ref 14–18)
IMM GRANULOCYTES # BLD AUTO: 0.02 K/UL (ref 0–0.11)
IMM GRANULOCYTES NFR BLD AUTO: 0.3 % (ref 0–0.9)
LYMPHOCYTES # BLD AUTO: 1.9 K/UL (ref 1–4.8)
LYMPHOCYTES NFR BLD: 28.5 % (ref 22–41)
MCH RBC QN AUTO: 29.9 PG (ref 27–33)
MCHC RBC AUTO-ENTMCNC: 32.6 G/DL (ref 33.7–35.3)
MCV RBC AUTO: 91.5 FL (ref 81.4–97.8)
MONOCYTES # BLD AUTO: 0.58 K/UL (ref 0–0.85)
MONOCYTES NFR BLD AUTO: 8.7 % (ref 0–13.4)
NEUTROPHILS # BLD AUTO: 4.01 K/UL (ref 1.82–7.42)
NEUTROPHILS NFR BLD: 60.2 % (ref 44–72)
NRBC # BLD AUTO: 0 K/UL
NRBC BLD-RTO: 0 /100 WBC
PLATELET # BLD AUTO: 263 K/UL (ref 164–446)
PMV BLD AUTO: 11.2 FL (ref 9–12.9)
POTASSIUM SERPL-SCNC: 4.2 MMOL/L (ref 3.6–5.5)
RBC # BLD AUTO: 4.02 M/UL (ref 4.7–6.1)
SODIUM SERPL-SCNC: 134 MMOL/L (ref 135–145)
WBC # BLD AUTO: 6.7 K/UL (ref 4.8–10.8)

## 2019-06-26 PROCEDURE — A9270 NON-COVERED ITEM OR SERVICE: HCPCS | Performed by: HOSPITALIST

## 2019-06-26 PROCEDURE — 80048 BASIC METABOLIC PNL TOTAL CA: CPT

## 2019-06-26 PROCEDURE — 700102 HCHG RX REV CODE 250 W/ 637 OVERRIDE(OP): Performed by: HOSPITALIST

## 2019-06-26 PROCEDURE — 82962 GLUCOSE BLOOD TEST: CPT

## 2019-06-26 PROCEDURE — 700102 HCHG RX REV CODE 250 W/ 637 OVERRIDE(OP): Performed by: INTERNAL MEDICINE

## 2019-06-26 PROCEDURE — 36415 COLL VENOUS BLD VENIPUNCTURE: CPT

## 2019-06-26 PROCEDURE — 770006 HCHG ROOM/CARE - MED/SURG/GYN SEMI*

## 2019-06-26 PROCEDURE — 85025 COMPLETE CBC W/AUTO DIFF WBC: CPT

## 2019-06-26 PROCEDURE — 700111 HCHG RX REV CODE 636 W/ 250 OVERRIDE (IP): Performed by: HOSPITALIST

## 2019-06-26 PROCEDURE — 99232 SBSQ HOSP IP/OBS MODERATE 35: CPT | Performed by: INTERNAL MEDICINE

## 2019-06-26 RX ADMIN — OXYCODONE HYDROCHLORIDE 10 MG: 5 TABLET ORAL at 23:44

## 2019-06-26 RX ADMIN — HEPARIN SODIUM 5000 UNITS: 5000 INJECTION, SOLUTION INTRAVENOUS; SUBCUTANEOUS at 06:26

## 2019-06-26 RX ADMIN — ASPIRIN 81 MG: 81 TABLET, COATED ORAL at 06:26

## 2019-06-26 RX ADMIN — INSULIN GLARGINE 20 UNITS: 100 INJECTION, SOLUTION SUBCUTANEOUS at 17:40

## 2019-06-26 RX ADMIN — HEPARIN SODIUM 5000 UNITS: 5000 INJECTION, SOLUTION INTRAVENOUS; SUBCUTANEOUS at 15:57

## 2019-06-26 RX ADMIN — HEPARIN SODIUM 5000 UNITS: 5000 INJECTION, SOLUTION INTRAVENOUS; SUBCUTANEOUS at 20:40

## 2019-06-26 RX ADMIN — CLOPIDOGREL BISULFATE 75 MG: 75 TABLET ORAL at 06:26

## 2019-06-26 RX ADMIN — ATORVASTATIN CALCIUM 40 MG: 40 TABLET, FILM COATED ORAL at 17:41

## 2019-06-26 ASSESSMENT — ENCOUNTER SYMPTOMS
FEVER: 0
SPUTUM PRODUCTION: 0
COUGH: 0
DOUBLE VISION: 0
EYE PAIN: 0
VOMITING: 0
NAUSEA: 0
DIZZINESS: 0
FALLS: 0
DIAPHORESIS: 0
ABDOMINAL PAIN: 0
BLURRED VISION: 0
WHEEZING: 0
NECK PAIN: 0
ORTHOPNEA: 0
PND: 0
HEADACHES: 0
DEPRESSION: 0
SENSORY CHANGE: 0
WEIGHT LOSS: 0

## 2019-06-26 ASSESSMENT — LIFESTYLE VARIABLES: SUBSTANCE_ABUSE: 0

## 2019-06-26 NOTE — PROGRESS NOTES
Assumed care at 1845. Pt resting in bed. A&ox 4  Ambulating to BR with standby assist and FWW  LLE acewrapped with wound vac  WBAT or heel bearing  Some numbness and tingling   Tolerating diet  Voiding adequately  Pain controlled  O2 on RA  Call light within reach. Hourly rounding in place

## 2019-06-26 NOTE — DISCHARGE PLANNING
Anticipated Discharge Disposition: Home    Action: Discussed POC with MD.  Anticipating pt will need outpatient wound care for wound vac dressing changes. Wound vac delivery pending; request awaiting MD signature on form on patient chart. Spoke with Outpatient wound clinic; earliest appointment for a new patient is the week of July 8th. MD notified.    Barriers to Discharge: Wound vac delivery (pending MD signature on request form)  Outpatient wound clinic appointment  Medical clearance     Plan: Await wound vac delivery and medical clearance.

## 2019-06-26 NOTE — PROGRESS NOTES
Received bedside report from KEENA Jones  Pt is AAOx4  SHIRA  VSS  Denies pain  Denies SOB  Denies N/V  +BS +Flatus Last BM 6/25  Pt has LLE dressing; ace wrapped w/ wound vac; no leaks noted  Pt up SBA; heel touch weight bearing  POC discussed  Bed locked and in the lowest position  Call light w/in reach  Hourly rounding in place

## 2019-06-26 NOTE — PROGRESS NOTES
[facility] Medicine Daily Progress Note    Date of Service  6/26/2019    Chief Complaint  53 y.o. male admitted 6/21/2019 with complains of left foot and heel ulcer    Hospital Course    Past medical history of PAD, diabetes uncontrolled, presented with complains of left foot and heel ulcer.  Patient was evaluated by vascular surgery and underwent angiogram and angioplasty.  Patient was put on antiplatelet therapy understanding.  Patient was also put on insulin for diabetes control.  Patient will have orthopedics and LPS evaluation.        Interval Problem Update  6/22.  Patient has been stable and comfortable overnight but still complains of left foot pain. Patient's pain is local, 6-8,/10, intermittent and does not radiate to other location, sharp and with some tingling. Can be controlled by pain meds. Dressing in place.  6/23. p patient still complains of lower extremity wound pain.  But pain can be controlled. Patient's pain is local 5-6/10, intermittent and does not radiate to other location, sharp and with some tingling. Can be controlled by pain meds. Dressing in place.  Still pending LPS recommendation.  6/24. Patient has been pleasant during the hospital stay and no significant overnight event.  Patient complains of lower extremity pain.  Patient was evaluated by orthopedics and will have surgery debridement this afternoon. Patient's pain is local 4-5/10, intermittent and does not radiate to other location, sharp and with some tingling. Can be controlled by pain meds. Dressing in place.  6/25.  Patient tolerated procedure yesterday very well.  Currently has wound VAC in place.  Patient still complains of left foot pain. Patient's pain is local, 6-8/10, intermittent and does not radiate to other location, sharp and with some tingling. Can be controlled by pain meds. Dressing in place.  6/26.  Patient relatively stable.  Patient blood sugar still not controlled.  Patient require wound care and wound VAC.  I put  on consult for outpatient wound clinic.  Pending further recommendation from orthopedics.  Patient complains of foot pain. Patient's pain is local, 6-7/10, intermittent and does not radiate to other location, sharp and with some tingling. Can be controlled by pain meds. Dressing in place and wound VAC in place      Consultants/Specialty  Vascular surgery, LPS, orthopedics    Code Status  Full code    Disposition  To be determined    Review of Systems  Review of Systems   Constitutional: Negative for diaphoresis, fever, malaise/fatigue and weight loss.   HENT: Negative for ear pain and hearing loss.    Eyes: Negative for blurred vision, double vision and pain.   Respiratory: Negative for cough, sputum production and wheezing.    Cardiovascular: Negative for chest pain, orthopnea, leg swelling and PND.   Gastrointestinal: Negative for abdominal pain, nausea and vomiting.   Genitourinary: Negative for dysuria, frequency and hematuria.   Musculoskeletal: Positive for joint pain. Negative for falls and neck pain.   Skin: Negative for rash.        Left foot unhealed ulcer   Neurological: Negative for dizziness, sensory change and headaches.   Psychiatric/Behavioral: Negative for depression and substance abuse.        Physical Exam  Temp:  [36.3 °C (97.3 °F)-37.5 °C (99.5 °F)] 37.5 °C (99.5 °F)  Pulse:  [85-96] 89  Resp:  [16-17] 16  BP: (115-139)/(80-88) 115/83  SpO2:  [94 %-97 %] 95 %    Physical Exam   Constitutional: He is oriented to person, place, and time. He appears well-developed.   HENT:   Head: Atraumatic.   Right Ear: External ear normal.   Left Ear: External ear normal.   Eyes: Pupils are equal, round, and reactive to light. Conjunctivae and EOM are normal. Right eye exhibits no discharge. Left eye exhibits no discharge.   Neck: Normal range of motion. Neck supple. No thyromegaly present.   Cardiovascular: Normal rate, regular rhythm and normal heart sounds.  Exam reveals no gallop and no friction rub.    No  murmur heard.  Pulmonary/Chest: Effort normal and breath sounds normal. No respiratory distress. He exhibits no tenderness.   Abdominal: Soft. Bowel sounds are normal. He exhibits no distension and no mass. There is no tenderness. There is no rebound.   Musculoskeletal: Normal range of motion. He exhibits no edema.   Lymphadenopathy:     He has no cervical adenopathy.   Neurological: He is alert and oriented to person, place, and time. He displays normal reflexes. No cranial nerve deficit.   Skin: Skin is dry. No rash noted. He is not diaphoretic. There is erythema.   Left foot and heel ulcer with surrounding erythema  Wound VAC in place   Psychiatric: He has a normal mood and affect.   Nursing note and vitals reviewed.      Fluids    Intake/Output Summary (Last 24 hours) at 06/26/19 1530  Last data filed at 06/26/19 0600   Gross per 24 hour   Intake              360 ml   Output             1000 ml   Net             -640 ml       Laboratory  Recent Labs      06/26/19   0404   WBC  6.7   RBC  4.02*   HEMOGLOBIN  12.0*   HEMATOCRIT  36.8*   MCV  91.5   MCH  29.9   MCHC  32.6*   RDW  42.1   PLATELETCT  263   MPV  11.2     Recent Labs      06/26/19   0404   SODIUM  134*   POTASSIUM  4.2   CHLORIDE  102   CO2  26   GLUCOSE  170*   BUN  13   CREATININE  0.81   CALCIUM  9.3                   Imaging  IR-EXTREMITY ANGIOGRAM-UNILATERAL LEFT    (Results Pending)        Assessment/Plan  * Peripheral arterial disease (HCC)   Assessment & Plan    Patient has had an angiogram and angioplasty done tonight to the left lower extremity.  His pulses are not palpable on my exam however his foot is warm, his pain is much less, and they are the pulses are dopplerable.  Patient was evaluated by vascular surgery.    Currently has good perfusion however patient has unhealed ulcer disease.    Patient continue wound care   Appreciate LPS and orthopedics recommendation plan for surgery 6/24  Patient is a lifelong non-smoker  Discontinue  antibiotics for now.  Continue wound care and wound VAC.  Patient will need wound VAC and wound clinic as outpatient.     Wound infection- (present on admission)   Assessment & Plan    Vascular surgery Dr. Coates is following.  Consult limb preservation service  Foot gangrene, status post debridement, I discontinue patient's antibiotics given patient has no signs of sepsis or local signs of infection anymore.  Wound care to be continued, appreciate input.    wound debridement 6/24  Wound VAC currently in place continue wound care  Infection resolved currently off antibiotics.     Hyperlipidemia- (present on admission)   Assessment & Plan    Continue statin     Uncontrolled type 2 diabetes mellitus with hyperglycemia (HCC)- (present on admission)   Assessment & Plan    Patient's most recent A1c was 12.3 implying very poor control.  He is maintained on glipizide and metformin as an outpatient.  I DCed oral DM meds for frequent n.p.o. status during the hospital stay and needs to control sugar better.  Cover with sliding scale  Patient blood sugar still not controlled, I increased Lantus to 20 units, titrate upwards as we have a better idea of how his sugars run in house  Diabetic education  I also ordered a hypoglycemic protocol to prevent hypoglycemic event    I add short acting insulin 2 units 3 times a day     History of acute ischemic stroke (HCC)- (present on admission)   Assessment & Plan    Patient currently has no further symptoms  I continue aspirin and adding Plavix for reasons of his PAD.  I also continue patient with statin.          VTE prophylaxis: Heparin subcu.      Current Facility-Administered Medications:   •  insulin regular (HUMULIN R) injection 2 Units, 2 Units, Subcutaneous, TID AC, Judy Henry M.D., 2 Units at 06/26/19 1200  •  insulin glargine (LANTUS) injection 20 Units, 20 Units, Subcutaneous, Q EVENING, Judy Henry M.D., 20 Units at 06/25/19 1724  •  insulin regular (HUMULIN R) injection 2-9  Units, 2-9 Units, Subcutaneous, TID AC, 6 Units at 06/26/19 1201 **AND** Accu-Chek ACHS, , , Q AC AND BEDTIME(S) **AND** NOTIFY MD and PharmD, , , Once **AND** glucose 4 g chewable tablet 16 g, 16 g, Oral, Q15 MIN PRN **AND** DEXTROSE 10% BOLUS 250 mL, 250 mL, Intravenous, Q15 MIN PRN, Judy Henry M.D.  •  aspirin EC (ECOTRIN) tablet 81 mg, 81 mg, Oral, DAILY, Tommie Greenfield D.O., 81 mg at 06/26/19 0626  •  atorvastatin (LIPITOR) tablet 40 mg, 40 mg, Oral, Q EVENING, Tommie Greenfield D.O., 40 mg at 06/25/19 1722  •  clopidogrel (PLAVIX) tablet 75 mg, 75 mg, Oral, DAILY, Tommie Greenfield D.O., 75 mg at 06/26/19 0626  •  senna-docusate (PERICOLACE or SENOKOT S) 8.6-50 MG per tablet 2 Tab, 2 Tab, Oral, BID **AND** polyethylene glycol/lytes (MIRALAX) PACKET 1 Packet, 1 Packet, Oral, QDAY PRN **AND** magnesium hydroxide (MILK OF MAGNESIA) suspension 30 mL, 30 mL, Oral, QDAY PRN **AND** bisacodyl (DULCOLAX) suppository 10 mg, 10 mg, Rectal, QDAY PRN, Tommie Greenfield D.O.  •  Respiratory Care per Protocol, , Nebulization, Continuous RT, Tommie Greenfield D.O.  •  acetaminophen (TYLENOL) tablet 650 mg, 650 mg, Oral, Q6HRS PRN, GIL Devine.O.  •  ondansetron (ZOFRAN) syringe/vial injection 4 mg, 4 mg, Intravenous, Q4HRS PRN, GIL Devine.O.  •  ondansetron (ZOFRAN ODT) dispertab 4 mg, 4 mg, Oral, Q4HRS PRN, Tommie Greenfield D.O.  •  promethazine (PHENERGAN) tablet 12.5-25 mg, 12.5-25 mg, Oral, Q4HRS PRN, Tommie Greenfield D.O.  •  promethazine (PHENERGAN) suppository 12.5-25 mg, 12.5-25 mg, Rectal, Q4HRS PRN, LUCÍA DevineO.  •  prochlorperazine (COMPAZINE) injection 5-10 mg, 5-10 mg, Intravenous, Q4HRS PRN, LUCÍA DevineO.  •  oxyCODONE immediate-release (ROXICODONE) tablet 5-10 mg, 5-10 mg, Oral, Q4HRS PRN, LUCÍA DevineOMert, 10 mg at 06/25/19 2141  •  heparin injection 5,000 Units, 5,000 Units, Subcutaneous,  Q8HRS, Tommie Greenfield D.O., 5,000 Units at 06/26/19 0606

## 2019-06-26 NOTE — CARE PLAN
Problem: Safety  Goal: Will remain free from injury  Updated about POC. Reinforce call light use. Pt acknowledged understanding    Problem: Venous Thromboembolism (VTW)/Deep Vein Thrombosis (DVT) Prevention:  Goal: Patient will participate in Venous Thrombosis (VTE)/Deep Vein Thrombosis (DVT)Prevention Measures  Refused scds tonight. Hep sub given    Problem: Pain Management  Goal: Pain level will decrease to patient's comfort goal  Pain controlled with oxy 5 -10 mg prn

## 2019-06-26 NOTE — PROGRESS NOTES
[facility] Medicine Daily Progress Note    Date of Service  6/25/2019    Chief Complaint  53 y.o. male admitted 6/21/2019 with complains of left foot and heel ulcer    Hospital Course    Past medical history of PAD, diabetes uncontrolled, presented with complains of left foot and heel ulcer.  Patient was evaluated by vascular surgery and underwent angiogram and angioplasty.  Patient was put on antiplatelet therapy understanding.  Patient was also put on insulin for diabetes control.  Patient will have orthopedics and LPS evaluation.        Interval Problem Update  6/22.  Patient has been stable and comfortable overnight but still complains of left foot pain. Patient's pain is local, 6-8,/10, intermittent and does not radiate to other location, sharp and with some tingling. Can be controlled by pain meds. Dressing in place.  6/23. p patient still complains of lower extremity wound pain.  But pain can be controlled. Patient's pain is local 5-6/10, intermittent and does not radiate to other location, sharp and with some tingling. Can be controlled by pain meds. Dressing in place.  Still pending LPS recommendation.  6/24. Patient has been pleasant during the hospital stay and no significant overnight event.  Patient complains of lower extremity pain.  Patient was evaluated by orthopedics and will have surgery debridement this afternoon. Patient's pain is local 4-5/10, intermittent and does not radiate to other location, sharp and with some tingling. Can be controlled by pain meds. Dressing in place.  6/25.  Patient tolerated procedure yesterday very well.  Currently has wound VAC in place.  Patient still complains of left foot pain. Patient's pain is local, 6-8/10, intermittent and does not radiate to other location, sharp and with some tingling. Can be controlled by pain meds. Dressing in place.      Consultants/Specialty  Vascular surgery, LPS, orthopedics    Code Status  Full code    Disposition  To be  determined    Review of Systems  Review of Systems   Constitutional: Negative for chills, diaphoresis, malaise/fatigue and weight loss.   HENT: Negative for ear pain and hearing loss.    Eyes: Negative for blurred vision, double vision and pain.   Respiratory: Negative for cough, sputum production and wheezing.    Cardiovascular: Negative for chest pain, palpitations, leg swelling and PND.   Gastrointestinal: Negative for constipation, diarrhea, heartburn and nausea.   Genitourinary: Negative for dysuria, frequency and hematuria.   Musculoskeletal: Positive for joint pain. Negative for falls and neck pain.   Skin: Negative for rash.        Left foot unhealed ulcer   Neurological: Negative for dizziness and sensory change.   Psychiatric/Behavioral: Negative for depression, substance abuse and suicidal ideas.        Physical Exam  Temp:  [35.8 °C (96.5 °F)-37.7 °C (99.9 °F)] 36.5 °C (97.7 °F)  Pulse:  [81-94] 94  Resp:  [11-18] 16  BP: (125-161)/(64-96) 125/82  SpO2:  [93 %-98 %] 95 %    Physical Exam   Constitutional: He is oriented to person, place, and time. He appears well-nourished. No distress.   HENT:   Head: Normocephalic.   Right Ear: External ear normal.   Left Ear: External ear normal.   Eyes: Pupils are equal, round, and reactive to light. Conjunctivae and EOM are normal. Right eye exhibits no discharge. Left eye exhibits no discharge.   Neck: Normal range of motion. Neck supple. No thyromegaly present.   Cardiovascular: Normal rate, regular rhythm and normal heart sounds.  Exam reveals no gallop and no friction rub.    No murmur heard.  Pulmonary/Chest: Effort normal and breath sounds normal. No respiratory distress. He exhibits no tenderness.   Abdominal: Soft. Bowel sounds are normal. He exhibits no distension and no mass. There is no tenderness. There is no rebound.   Musculoskeletal: Normal range of motion. He exhibits no edema.   Lymphadenopathy:     He has no cervical adenopathy.   Neurological: He  is alert and oriented to person, place, and time. He displays normal reflexes. No cranial nerve deficit.   Skin: Skin is dry. No rash noted. There is erythema.   Left foot and heel ulcer with surrounding erythema   Psychiatric: He has a normal mood and affect.   Nursing note and vitals reviewed.      Fluids    Intake/Output Summary (Last 24 hours) at 06/25/19 1703  Last data filed at 06/25/19 0353   Gross per 24 hour   Intake             1680 ml   Output              915 ml   Net              765 ml       Laboratory                        Imaging  IR-EXTREMITY ANGIOGRAM-UNILATERAL LEFT    (Results Pending)        Assessment/Plan  * Peripheral arterial disease (HCC)   Assessment & Plan    Patient has had an angiogram and angioplasty done tonight to the left lower extremity.  His pulses are not palpable on my exam however his foot is warm, his pain is much less, and they are the pulses are dopplerable.  Patient was evaluated by vascular surgery.    Currently has good perfusion however patient has unhealed ulcer disease.    Patient continue wound care   Appreciate LPS and orthopedics recommendation plan for surgery 6/24  Patient is a lifelong non-smoker  Discontinue antibiotics for now.  Continue wound care and wound VAC.     Wound infection- (present on admission)   Assessment & Plan    Vascular surgery Dr. Coates is following.  Consult limb preservation service  Foot gangrene, status post debridement, I discontinue patient's antibiotics given patient has no signs of sepsis or local signs of infection anymore.  Wound care to be continued, appreciate input.    wound debridement 6/24  Wound VAC currently in place continue wound care     Hyperlipidemia- (present on admission)   Assessment & Plan    Continue statin     Uncontrolled type 2 diabetes mellitus with hyperglycemia (HCC)- (present on admission)   Assessment & Plan    Patient's most recent A1c was 12.3 implying very poor control.  He is maintained on glipizide  and metformin as an outpatient.  I DCed oral DM meds for frequent n.p.o. status during the hospital stay and needs to control sugar better.  Cover with sliding scale  Patient blood sugar still not controlled, I increased Lantus to 20 units, titrate upwards as we have a better idea of how his sugars run in house  Diabetic education  I also ordered a hypoglycemic protocol to prevent hypoglycemic event     Acute ischemic stroke (HCC)- (present on admission)   Assessment & Plan    Patient currently has no further symptoms  I continue aspirin and adding Plavix for reasons of his PAD.  I also continue patient with statin.          VTE prophylaxis: Heparin subcu.      Current Facility-Administered Medications:   •  insulin glargine (LANTUS) injection 20 Units, 20 Units, Subcutaneous, Q EVENING, Judy Henry M.D.  •  insulin regular (HUMULIN R) injection 2-9 Units, 2-9 Units, Subcutaneous, TID AC, 3 Units at 06/25/19 1214 **AND** Accu-Chek ACHS, , , Q AC AND BEDTIME(S) **AND** NOTIFY MD and PharmD, , , Once **AND** glucose 4 g chewable tablet 16 g, 16 g, Oral, Q15 MIN PRN **AND** DEXTROSE 10% BOLUS 250 mL, 250 mL, Intravenous, Q15 MIN PRN, Judy Henry M.D.  •  aspirin EC (ECOTRIN) tablet 81 mg, 81 mg, Oral, DAILY, Tommie Greenfield D.O., 81 mg at 06/25/19 0555  •  atorvastatin (LIPITOR) tablet 40 mg, 40 mg, Oral, Q EVENING, Tommie Greenfield D.O., 40 mg at 06/24/19 2142  •  clopidogrel (PLAVIX) tablet 75 mg, 75 mg, Oral, DAILY, Tommie Greenfield, D.O., 75 mg at 06/25/19 0555  •  senna-docusate (PERICOLACE or SENOKOT S) 8.6-50 MG per tablet 2 Tab, 2 Tab, Oral, BID **AND** polyethylene glycol/lytes (MIRALAX) PACKET 1 Packet, 1 Packet, Oral, QDAY PRN **AND** magnesium hydroxide (MILK OF MAGNESIA) suspension 30 mL, 30 mL, Oral, QDAY PRN **AND** bisacodyl (DULCOLAX) suppository 10 mg, 10 mg, Rectal, QDAY PRN, Tommie Greenfield D.O.  •  Respiratory Care per Protocol, , Nebulization, Continuous RT, Tommie  GIL Greenfield.O.  •  acetaminophen (TYLENOL) tablet 650 mg, 650 mg, Oral, Q6HRS PRN, LUCÍA DevineO.  •  ondansetron (ZOFRAN) syringe/vial injection 4 mg, 4 mg, Intravenous, Q4HRS PRN, GIL Devine.O.  •  ondansetron (ZOFRAN ODT) dispertab 4 mg, 4 mg, Oral, Q4HRS PRN, GIL Devine.O.  •  promethazine (PHENERGAN) tablet 12.5-25 mg, 12.5-25 mg, Oral, Q4HRS PRN, GIL Devine.O.  •  promethazine (PHENERGAN) suppository 12.5-25 mg, 12.5-25 mg, Rectal, Q4HRS PRN, GIL Devine.O.  •  prochlorperazine (COMPAZINE) injection 5-10 mg, 5-10 mg, Intravenous, Q4HRS PRN, GIL Devine.O.  •  oxyCODONE immediate-release (ROXICODONE) tablet 5-10 mg, 5-10 mg, Oral, Q4HRS PRN, LUCÍA DevineOMert, 5 mg at 06/25/19 1210  •  heparin injection 5,000 Units, 5,000 Units, Subcutaneous, Q8HRS, LUCÍA DevineOMert, 5,000 Units at 06/25/19 8465

## 2019-06-26 NOTE — CARE PLAN
Problem: Venous Thromboembolism (VTW)/Deep Vein Thrombosis (DVT) Prevention:  Goal: Patient will participate in Venous Thrombosis (VTE)/Deep Vein Thrombosis (DVT)Prevention Measures  Outcome: PROGRESSING AS EXPECTED  Pt on subq heparin and plavix     Problem: Discharge Barriers/Planning  Goal: Patient's continuum of care needs will be met  Outcome: PROGRESSING SLOWER THAN EXPECTED  Pending home wound vac    Problem: Pain Management  Goal: Pain level will decrease to patient's comfort goal  Outcome: PROGRESSING AS EXPECTED  Denies pain at this time

## 2019-06-27 LAB
GLUCOSE BLD-MCNC: 143 MG/DL (ref 65–99)
GLUCOSE BLD-MCNC: 206 MG/DL (ref 65–99)
GLUCOSE BLD-MCNC: 207 MG/DL (ref 65–99)

## 2019-06-27 PROCEDURE — 82962 GLUCOSE BLOOD TEST: CPT | Mod: 91

## 2019-06-27 PROCEDURE — 700102 HCHG RX REV CODE 250 W/ 637 OVERRIDE(OP): Performed by: HOSPITALIST

## 2019-06-27 PROCEDURE — A9270 NON-COVERED ITEM OR SERVICE: HCPCS | Performed by: HOSPITALIST

## 2019-06-27 PROCEDURE — 770006 HCHG ROOM/CARE - MED/SURG/GYN SEMI*

## 2019-06-27 PROCEDURE — 99232 SBSQ HOSP IP/OBS MODERATE 35: CPT | Performed by: INTERNAL MEDICINE

## 2019-06-27 PROCEDURE — 700111 HCHG RX REV CODE 636 W/ 250 OVERRIDE (IP): Performed by: HOSPITALIST

## 2019-06-27 PROCEDURE — 700102 HCHG RX REV CODE 250 W/ 637 OVERRIDE(OP): Performed by: INTERNAL MEDICINE

## 2019-06-27 PROCEDURE — 97165 OT EVAL LOW COMPLEX 30 MIN: CPT

## 2019-06-27 PROCEDURE — 97161 PT EVAL LOW COMPLEX 20 MIN: CPT

## 2019-06-27 RX ADMIN — HEPARIN SODIUM 5000 UNITS: 5000 INJECTION, SOLUTION INTRAVENOUS; SUBCUTANEOUS at 04:54

## 2019-06-27 RX ADMIN — OXYCODONE HYDROCHLORIDE 10 MG: 5 TABLET ORAL at 21:52

## 2019-06-27 RX ADMIN — ASPIRIN 81 MG: 81 TABLET, COATED ORAL at 04:54

## 2019-06-27 RX ADMIN — HEPARIN SODIUM 5000 UNITS: 5000 INJECTION, SOLUTION INTRAVENOUS; SUBCUTANEOUS at 13:13

## 2019-06-27 RX ADMIN — INSULIN GLARGINE 20 UNITS: 100 INJECTION, SOLUTION SUBCUTANEOUS at 17:44

## 2019-06-27 RX ADMIN — HEPARIN SODIUM 5000 UNITS: 5000 INJECTION, SOLUTION INTRAVENOUS; SUBCUTANEOUS at 21:53

## 2019-06-27 RX ADMIN — ATORVASTATIN CALCIUM 40 MG: 40 TABLET, FILM COATED ORAL at 17:39

## 2019-06-27 RX ADMIN — CLOPIDOGREL BISULFATE 75 MG: 75 TABLET ORAL at 04:54

## 2019-06-27 ASSESSMENT — ENCOUNTER SYMPTOMS
DOUBLE VISION: 0
EYE DISCHARGE: 0
BLURRED VISION: 0
NECK PAIN: 0
DIAPHORESIS: 0
FEVER: 0
FALLS: 0
SPUTUM PRODUCTION: 0
WHEEZING: 0
ORTHOPNEA: 0
DIZZINESS: 0
ABDOMINAL PAIN: 0
PND: 0
NAUSEA: 0
EYE PAIN: 0
VOMITING: 0
DEPRESSION: 0
CONSTIPATION: 0
TREMORS: 0
COUGH: 0
HEADACHES: 0
SENSORY CHANGE: 0

## 2019-06-27 ASSESSMENT — COGNITIVE AND FUNCTIONAL STATUS - GENERAL
DAILY ACTIVITIY SCORE: 24
CLIMB 3 TO 5 STEPS WITH RAILING: A LITTLE
SUGGESTED CMS G CODE MODIFIER DAILY ACTIVITY: CH
SUGGESTED CMS G CODE MODIFIER MOBILITY: CJ
MOBILITY SCORE: 22
WALKING IN HOSPITAL ROOM: A LITTLE

## 2019-06-27 ASSESSMENT — GAIT ASSESSMENTS
ASSISTIVE DEVICE: FRONT WHEEL WALKER
GAIT LEVEL OF ASSIST: SUPERVISED
DEVIATION: STEP TO
DISTANCE (FEET): 50

## 2019-06-27 ASSESSMENT — LIFESTYLE VARIABLES: SUBSTANCE_ABUSE: 0

## 2019-06-27 ASSESSMENT — ACTIVITIES OF DAILY LIVING (ADL): TOILETING: INDEPENDENT

## 2019-06-27 NOTE — PROGRESS NOTES
"Report received from night RN, assumed Care.   Patient is AOx4, responds appropriately.      Pain controlled at this time, declines need for intervention at this time.  VAC in place to LLE with ace wrap around.  Educated to be weight bearing only to heel on LLE.  Vac shows no leak at this time, 125 suction in place, scant output in the chamber.  PT/OT in to see patient.  Suggesting offloading shoe.    PIV SL.  Patient is tolerating diabetic vegeterian diet, denies nausea/vomiting. + flatus  Up standby with FWW  Family at bedside.  Sitting up in bed for all meals.   Room air. Denies cough.    Plan of care discussed, all questions answered.        Call light and belongings within reach, treaded slipper socks on, SCD refused at this time- \"I will put it on later\", bed in lowest locked position.  Hourly rounding in place, all needs met at this time  "

## 2019-06-27 NOTE — THERAPY
"Occupational Therapy Evaluation completed.   Functional Status:  Spv w/bed mobility, spv w/LB dressing, spv w/maintaining heel WB short ambulation in room w/o fww and txf to chair. Pt demonstrating functional strength and balance to participate in ADL's. Discussed w/RN regarding possible need for shoe/boot defer to PT. Remained up in chair w/call light and tray table in place   Plan of Care: Patient with no further skilled OT needs in the acute care setting at this time  Discharge Recommendations:  Equipment: Front-Wheel Walker. Post-acute therapy Anticipate that the patient will have no further occupational therapy needs after discharge from the hospital.     See \"Rehab Therapy-Acute\" Patient Summary Report for complete documentation.      53 yr old male admitted for left foot pain and heel ulcer, pt is now s/p I&D of L foot and toes w/place of wound vac. Pt is to be heel WB and is awaiting coordination of wound care. At this time demonstrated ability to complete ADL's and txfs and reports assist as needed anticipate no further OT needs after d/c     "

## 2019-06-27 NOTE — FACE TO FACE
Face to Face Note  -  Durable Medical Equipment    Judy Henry M.D. - NPI: [id number]  I certify that this patient is under my care and that they had a durable medical equipment(DME)face to face encounter by myself that meets the physician DME face-to-face encounter requirements with this patient on:    Date of encounter:   Patient:                    MRN:                       YOB: 2019  Israel Aviles  [id number]  1965     The encounter with the patient was in whole, or in part, for the following medical condition, which is the primary reason for durable medical equipment:  Wound Care    I certify that, based on my findings, the following durable medical equipment is medically necessary:  Walkers.    HOME O2 Saturation Measurements:(Values must be present for Home Oxygen orders)         ,     ,         My Clinical findings support the need for the above equipment due to:  Abnormal Gait    Supporting Symptoms: none

## 2019-06-27 NOTE — PROGRESS NOTES
Assumed care of patient at 1900    Pt is A&O X 4  Pain 2/10.  Pt declined medication at this time.  Pt described pain as tolerable  Pt denies nausea  Tolerating Diet  Lt foot wound.  Wound vac in place.  Set to suction at 125.  Patent no leaks  Positive Urine Void   Positive Flatus  Positive BM Void  Up self   SCD's refused despite pt education.  Pt verbalized understanding of education  Bed in lowest position and locked.  Bed alarm not indicated per Mendy Willingham Assessment.   Reviewed plan of care with patient, bed in lowest position and locked, pt resting comfortably now, call light within reach, all needs met at this time

## 2019-06-27 NOTE — PROGRESS NOTES
[facility] Medicine Daily Progress Note    Date of Service  6/27/2019    Chief Complaint  53 y.o. male admitted 6/21/2019 with complains of left foot and heel ulcer    Hospital Course    Past medical history of PAD, diabetes uncontrolled, presented with complains of left foot and heel ulcer.  Patient was evaluated by vascular surgery and underwent angiogram and angioplasty.  Patient was put on antiplatelet therapy understanding.  Patient was also put on insulin for diabetes control.  Patient will have orthopedics and LPS evaluation.        Interval Problem Update  6/22.  Patient has been stable and comfortable overnight but still complains of left foot pain. Patient's pain is local, 6-8,/10, intermittent and does not radiate to other location, sharp and with some tingling. Can be controlled by pain meds. Dressing in place.  6/23. p patient still complains of lower extremity wound pain.  But pain can be controlled. Patient's pain is local 5-6/10, intermittent and does not radiate to other location, sharp and with some tingling. Can be controlled by pain meds. Dressing in place.  Still pending LPS recommendation.  6/24. Patient has been pleasant during the hospital stay and no significant overnight event.  Patient complains of lower extremity pain.  Patient was evaluated by orthopedics and will have surgery debridement this afternoon. Patient's pain is local 4-5/10, intermittent and does not radiate to other location, sharp and with some tingling. Can be controlled by pain meds. Dressing in place.  6/25.  Patient tolerated procedure yesterday very well.  Currently has wound VAC in place.  Patient still complains of left foot pain. Patient's pain is local, 6-8/10, intermittent and does not radiate to other location, sharp and with some tingling. Can be controlled by pain meds. Dressing in place.  6/26.  Patient relatively stable.  Patient blood sugar still not controlled.  Patient require wound care and wound VAC.  I put  on consult for outpatient wound clinic.  Pending further recommendation from orthopedics.  Patient complains of foot pain. Patient's pain is local, 6-7/10, intermittent and does not radiate to other location, sharp and with some tingling. Can be controlled by pain meds. Dressing in place and wound VAC in place  6/27.  Patient has been pleasant, wound VAC in place and working appropriately.   continue to work with outpatient clinic appointment and wound VAC to be delivered as outpatient.  Patient complains of local pain. Patient's pain is local, 4-6/10, intermittent and does not radiate to other location, sharp and with some tingling. Can be controlled by pain meds. Dressing in place.        Consultants/Specialty  Vascular surgery, LPS, orthopedics    Code Status  Full code    Disposition  To be determined    Review of Systems  Review of Systems   Constitutional: Negative for diaphoresis, fever and malaise/fatigue.   HENT: Negative for ear pain and hearing loss.    Eyes: Negative for blurred vision, double vision, pain and discharge.   Respiratory: Negative for cough, sputum production and wheezing.    Cardiovascular: Negative for chest pain, orthopnea, leg swelling and PND.   Gastrointestinal: Negative for abdominal pain, constipation, nausea and vomiting.   Genitourinary: Negative for dysuria, frequency and hematuria.   Musculoskeletal: Positive for joint pain. Negative for falls and neck pain.   Skin: Negative for rash.        Left foot unhealed ulcer   Neurological: Negative for dizziness, tremors, sensory change and headaches.   Psychiatric/Behavioral: Negative for depression and substance abuse.        Physical Exam  Temp:  [36.2 °C (97.1 °F)-36.4 °C (97.6 °F)] 36.4 °C (97.6 °F)  Pulse:  [89-98] 91  Resp:  [16-18] 18  BP: (120-135)/(83-88) 135/83  SpO2:  [91 %-94 %] 93 %    Physical Exam   Constitutional: He is oriented to person, place, and time. He appears well-nourished. No distress.   HENT:    Head: Atraumatic.   Right Ear: External ear normal.   Left Ear: External ear normal.   Eyes: Pupils are equal, round, and reactive to light. Conjunctivae and EOM are normal. Right eye exhibits no discharge. Left eye exhibits no discharge.   Neck: Normal range of motion. Neck supple. No thyromegaly present.   Cardiovascular: Normal rate, regular rhythm and normal heart sounds.  Exam reveals no gallop and no friction rub.    No murmur heard.  Pulmonary/Chest: Effort normal and breath sounds normal. No respiratory distress. He has no wheezes. He exhibits no tenderness.   Abdominal: Soft. Bowel sounds are normal. He exhibits no distension and no mass. There is no rebound.   Musculoskeletal: Normal range of motion. He exhibits no tenderness.   Lymphadenopathy:     He has no cervical adenopathy.   Neurological: He is alert and oriented to person, place, and time. He displays normal reflexes. No cranial nerve deficit.   Skin: Skin is dry. No erythema.   Left foot and heel ulcer with surrounding erythema  Wound VAC in place   Psychiatric: He has a normal mood and affect.   Nursing note and vitals reviewed.      Fluids    Intake/Output Summary (Last 24 hours) at 06/27/19 1617  Last data filed at 06/27/19 1300   Gross per 24 hour   Intake             1680 ml   Output                0 ml   Net             1680 ml       Laboratory  Recent Labs      06/26/19   0404   WBC  6.7   RBC  4.02*   HEMOGLOBIN  12.0*   HEMATOCRIT  36.8*   MCV  91.5   MCH  29.9   MCHC  32.6*   RDW  42.1   PLATELETCT  263   MPV  11.2     Recent Labs      06/26/19   0404   SODIUM  134*   POTASSIUM  4.2   CHLORIDE  102   CO2  26   GLUCOSE  170*   BUN  13   CREATININE  0.81   CALCIUM  9.3                   Imaging  IR-EXTREMITY ANGIOGRAM-UNILATERAL LEFT    (Results Pending)        Assessment/Plan  * Peripheral arterial disease (HCC)   Assessment & Plan    Patient has had an angiogram and angioplasty done tonight to the left lower extremity.  His pulses are  not palpable on my exam however his foot is warm, his pain is much less, and they are the pulses are dopplerable.  Patient was evaluated by vascular surgery.    Currently has good perfusion however patient has unhealed ulcer disease.    Patient continue wound care   Appreciate LPS and orthopedics recommendation plan for surgery 6/24  Patient is a lifelong non-smoker  Discontinue antibiotics for now.  Continue wound care and wound VAC.  Patient will need wound VAC and wound clinic as outpatient.  Currently still pending wound VAC and wound clinic to be set up,  to help     Wound infection- (present on admission)   Assessment & Plan    Vascular surgery Dr. Coates is following.  Consult limb preservation service  Foot gangrene, status post debridement, I discontinue patient's antibiotics given patient has no signs of sepsis or local signs of infection anymore.  Wound care to be continued, appreciate input.    wound debridement 6/24  Wound VAC currently in place continue wound care  Infection resolved currently off antibiotics.     Hyperlipidemia- (present on admission)   Assessment & Plan    Continue statin     Uncontrolled type 2 diabetes mellitus with hyperglycemia (HCC)- (present on admission)   Assessment & Plan    Patient's most recent A1c was 12.3 implying very poor control.  He is maintained on glipizide and metformin as an outpatient.  I DCed oral DM meds for frequent n.p.o. status during the hospital stay and needs to control sugar better.  Cover with sliding scale  Patient blood sugar still not controlled, I increased Lantus to 20 units, titrate upwards as we have a better idea of how his sugars run in house  Diabetic education  I also ordered a hypoglycemic protocol to prevent hypoglycemic event    I increase short acting insulin 3 units 3 times a day, blood sugar still not controlled     History of acute ischemic stroke (HCC)- (present on admission)   Assessment & Plan    Patient currently has  no further symptoms  I continue aspirin and adding Plavix for reasons of his PAD.  I also continue patient with statin.          VTE prophylaxis: Heparin subcu.      Current Facility-Administered Medications:   •  insulin regular (HUMULIN R) injection 3 Units, 3 Units, Subcutaneous, TID AC, Judy Hnery M.D., 3 Units at 06/27/19 1320  •  insulin glargine (LANTUS) injection 20 Units, 20 Units, Subcutaneous, Q EVENING, Judy Henry M.D., 20 Units at 06/26/19 1740  •  insulin regular (HUMULIN R) injection 2-9 Units, 2-9 Units, Subcutaneous, TID AC, 3 Units at 06/27/19 1321 **AND** Accu-Chek ACHS, , , Q AC AND BEDTIME(S) **AND** NOTIFY MD and PharmD, , , Once **AND** glucose 4 g chewable tablet 16 g, 16 g, Oral, Q15 MIN PRN **AND** DEXTROSE 10% BOLUS 250 mL, 250 mL, Intravenous, Q15 MIN PRN, Judy eHnry M.D.  •  aspirin EC (ECOTRIN) tablet 81 mg, 81 mg, Oral, DAILY, Tommie Greenfield D.O., 81 mg at 06/27/19 0454  •  atorvastatin (LIPITOR) tablet 40 mg, 40 mg, Oral, Q EVENING, Tommie Greenfield D.O., 40 mg at 06/26/19 1741  •  clopidogrel (PLAVIX) tablet 75 mg, 75 mg, Oral, DAILY, Tommie Greenfield D.O., 75 mg at 06/27/19 0454  •  senna-docusate (PERICOLACE or SENOKOT S) 8.6-50 MG per tablet 2 Tab, 2 Tab, Oral, BID **AND** polyethylene glycol/lytes (MIRALAX) PACKET 1 Packet, 1 Packet, Oral, QDAY PRN **AND** magnesium hydroxide (MILK OF MAGNESIA) suspension 30 mL, 30 mL, Oral, QDAY PRN **AND** bisacodyl (DULCOLAX) suppository 10 mg, 10 mg, Rectal, QDAY PRN, Tommie Greenfield D.O.  •  Respiratory Care per Protocol, , Nebulization, Continuous RT, GIL Devine.O.  •  acetaminophen (TYLENOL) tablet 650 mg, 650 mg, Oral, Q6HRS PRN, GIL Devine.O.  •  ondansetron (ZOFRAN) syringe/vial injection 4 mg, 4 mg, Intravenous, Q4HRS PRN, GIL Devine.O.  •  ondansetron (ZOFRAN ODT) dispertab 4 mg, 4 mg, Oral, Q4HRS PRN, GIL Devine.O.  •  promethazine (PHENERGAN)  tablet 12.5-25 mg, 12.5-25 mg, Oral, Q4HRS PRN, LUCÍA DevineO.  •  promethazine (PHENERGAN) suppository 12.5-25 mg, 12.5-25 mg, Rectal, Q4HRS PRN, LUCÍA DevineO.  •  prochlorperazine (COMPAZINE) injection 5-10 mg, 5-10 mg, Intravenous, Q4HRS PRN, GIL Devine.O.  •  oxyCODONE immediate-release (ROXICODONE) tablet 5-10 mg, 5-10 mg, Oral, Q4HRS PRN, LUCÍA DevineOMert, 10 mg at 06/26/19 7290  •  heparin injection 5,000 Units, 5,000 Units, Subcutaneous, Q8HRS, LUCÍA DevineOMert, 5,000 Units at 06/27/19 8092

## 2019-06-27 NOTE — CARE PLAN
Problem: Safety  Goal: Will remain free from falls  Pt remains free from fall at this time.  Pt educated on fall risk.  Pt demonstrates understanding by appropriate use of call light to call for assistance.  CLIP, hourly rounding in place    Problem: Venous Thromboembolism (VTW)/Deep Vein Thrombosis (DVT) Prevention:  Goal: Patient will participate in Venous Thrombosis (VTE)/Deep Vein Thrombosis (DVT)Prevention Measures   06/26/19 2030   Mechanical/VTE Prophylaxis   Mechanical Prophylaxis  SCDs, Sequential Compression Device   SCDs, Sequential Compression Device Refused  (Refused despite education)   OTHER   Risk Assessment Score 3   VTE RISK High

## 2019-06-28 LAB
ANION GAP SERPL CALC-SCNC: 9 MMOL/L (ref 0–11.9)
BASOPHILS # BLD AUTO: 0.5 % (ref 0–1.8)
BASOPHILS # BLD: 0.03 K/UL (ref 0–0.12)
BUN SERPL-MCNC: 13 MG/DL (ref 8–22)
CALCIUM SERPL-MCNC: 9.4 MG/DL (ref 8.5–10.5)
CHLORIDE SERPL-SCNC: 102 MMOL/L (ref 96–112)
CO2 SERPL-SCNC: 26 MMOL/L (ref 20–33)
CREAT SERPL-MCNC: 0.84 MG/DL (ref 0.5–1.4)
EOSINOPHIL # BLD AUTO: 0.08 K/UL (ref 0–0.51)
EOSINOPHIL NFR BLD: 1.3 % (ref 0–6.9)
ERYTHROCYTE [DISTWIDTH] IN BLOOD BY AUTOMATED COUNT: 43 FL (ref 35.9–50)
EST. AVERAGE GLUCOSE BLD GHB EST-MCNC: 209 MG/DL
GLUCOSE BLD-MCNC: 121 MG/DL (ref 65–99)
GLUCOSE BLD-MCNC: 211 MG/DL (ref 65–99)
GLUCOSE BLD-MCNC: 227 MG/DL (ref 65–99)
GLUCOSE SERPL-MCNC: 159 MG/DL (ref 65–99)
HBA1C MFR BLD: 8.9 % (ref 0–5.6)
HCT VFR BLD AUTO: 37.5 % (ref 42–52)
HGB BLD-MCNC: 12 G/DL (ref 14–18)
IMM GRANULOCYTES # BLD AUTO: 0.02 K/UL (ref 0–0.11)
IMM GRANULOCYTES NFR BLD AUTO: 0.3 % (ref 0–0.9)
LYMPHOCYTES # BLD AUTO: 1.56 K/UL (ref 1–4.8)
LYMPHOCYTES NFR BLD: 25.4 % (ref 22–41)
MCH RBC QN AUTO: 29.3 PG (ref 27–33)
MCHC RBC AUTO-ENTMCNC: 32 G/DL (ref 33.7–35.3)
MCV RBC AUTO: 91.7 FL (ref 81.4–97.8)
MONOCYTES # BLD AUTO: 0.47 K/UL (ref 0–0.85)
MONOCYTES NFR BLD AUTO: 7.7 % (ref 0–13.4)
NEUTROPHILS # BLD AUTO: 3.97 K/UL (ref 1.82–7.42)
NEUTROPHILS NFR BLD: 64.8 % (ref 44–72)
NRBC # BLD AUTO: 0 K/UL
NRBC BLD-RTO: 0 /100 WBC
PLATELET # BLD AUTO: 286 K/UL (ref 164–446)
PMV BLD AUTO: 11 FL (ref 9–12.9)
POTASSIUM SERPL-SCNC: 4.3 MMOL/L (ref 3.6–5.5)
RBC # BLD AUTO: 4.09 M/UL (ref 4.7–6.1)
SODIUM SERPL-SCNC: 137 MMOL/L (ref 135–145)
WBC # BLD AUTO: 6.1 K/UL (ref 4.8–10.8)

## 2019-06-28 PROCEDURE — 700111 HCHG RX REV CODE 636 W/ 250 OVERRIDE (IP): Performed by: HOSPITALIST

## 2019-06-28 PROCEDURE — 700102 HCHG RX REV CODE 250 W/ 637 OVERRIDE(OP): Performed by: INTERNAL MEDICINE

## 2019-06-28 PROCEDURE — 99232 SBSQ HOSP IP/OBS MODERATE 35: CPT | Performed by: INTERNAL MEDICINE

## 2019-06-28 PROCEDURE — 700102 HCHG RX REV CODE 250 W/ 637 OVERRIDE(OP): Performed by: HOSPITALIST

## 2019-06-28 PROCEDURE — A9270 NON-COVERED ITEM OR SERVICE: HCPCS | Performed by: HOSPITALIST

## 2019-06-28 PROCEDURE — 36415 COLL VENOUS BLD VENIPUNCTURE: CPT

## 2019-06-28 PROCEDURE — 83036 HEMOGLOBIN GLYCOSYLATED A1C: CPT

## 2019-06-28 PROCEDURE — 85025 COMPLETE CBC W/AUTO DIFF WBC: CPT

## 2019-06-28 PROCEDURE — 80048 BASIC METABOLIC PNL TOTAL CA: CPT

## 2019-06-28 PROCEDURE — 770006 HCHG ROOM/CARE - MED/SURG/GYN SEMI*

## 2019-06-28 PROCEDURE — 82962 GLUCOSE BLOOD TEST: CPT

## 2019-06-28 RX ADMIN — INSULIN GLARGINE 20 UNITS: 100 INJECTION, SOLUTION SUBCUTANEOUS at 17:43

## 2019-06-28 RX ADMIN — OXYCODONE HYDROCHLORIDE 5 MG: 5 TABLET ORAL at 22:11

## 2019-06-28 RX ADMIN — HEPARIN SODIUM 5000 UNITS: 5000 INJECTION, SOLUTION INTRAVENOUS; SUBCUTANEOUS at 06:37

## 2019-06-28 RX ADMIN — ASPIRIN 81 MG: 81 TABLET, COATED ORAL at 06:37

## 2019-06-28 RX ADMIN — HEPARIN SODIUM 5000 UNITS: 5000 INJECTION, SOLUTION INTRAVENOUS; SUBCUTANEOUS at 13:09

## 2019-06-28 RX ADMIN — ATORVASTATIN CALCIUM 40 MG: 40 TABLET, FILM COATED ORAL at 17:37

## 2019-06-28 RX ADMIN — CLOPIDOGREL BISULFATE 75 MG: 75 TABLET ORAL at 06:37

## 2019-06-28 RX ADMIN — HEPARIN SODIUM 5000 UNITS: 5000 INJECTION, SOLUTION INTRAVENOUS; SUBCUTANEOUS at 20:59

## 2019-06-28 ASSESSMENT — ENCOUNTER SYMPTOMS
DIAPHORESIS: 0
CHILLS: 0
BLURRED VISION: 0
DIZZINESS: 0
DEPRESSION: 0
COUGH: 0
HEADACHES: 0
EYE PAIN: 0
ORTHOPNEA: 0
TINGLING: 0
NAUSEA: 0
EYE DISCHARGE: 0
SPUTUM PRODUCTION: 0
WHEEZING: 0
DIARRHEA: 0
MYALGIAS: 0
FALLS: 0
TREMORS: 0
DOUBLE VISION: 0
VOMITING: 0
NECK PAIN: 0
PND: 0

## 2019-06-28 ASSESSMENT — LIFESTYLE VARIABLES
SUBSTANCE_ABUSE: 0
DO YOU DRINK ALCOHOL: NO

## 2019-06-28 NOTE — PROGRESS NOTES
Report received, poc discussed, assumed care of pt.   Call light in reach, hourly rounding in place.   Pt gets up SBA.   DM vegetarian diet.  + void. LBM 6/27.   Occasional oxy needed for pain.  Drsg to LLE, leave in place per MD, wound vac under ACE wrap.  No further needs.

## 2019-06-28 NOTE — PROGRESS NOTES
[facility] Medicine Daily Progress Note    Date of Service  6/28/2019    Chief Complaint  53 y.o. male admitted 6/21/2019 with complains of left foot and heel ulcer    Hospital Course    Past medical history of PAD, diabetes uncontrolled, presented with complains of left foot and heel ulcer.  Patient was evaluated by vascular surgery and underwent angiogram and angioplasty.  Patient was put on antiplatelet therapy understanding.  Patient was also put on insulin for diabetes control.  Patient will have orthopedics and LPS evaluation.        Interval Problem Update  6/22.  Patient has been stable and comfortable overnight but still complains of left foot pain. Patient's pain is local, 6-8,/10, intermittent and does not radiate to other location, sharp and with some tingling. Can be controlled by pain meds. Dressing in place.  6/23. p patient still complains of lower extremity wound pain.  But pain can be controlled. Patient's pain is local 5-6/10, intermittent and does not radiate to other location, sharp and with some tingling. Can be controlled by pain meds. Dressing in place.  Still pending LPS recommendation.  6/24. Patient has been pleasant during the hospital stay and no significant overnight event.  Patient complains of lower extremity pain.  Patient was evaluated by orthopedics and will have surgery debridement this afternoon. Patient's pain is local 4-5/10, intermittent and does not radiate to other location, sharp and with some tingling. Can be controlled by pain meds. Dressing in place.  6/25.  Patient tolerated procedure yesterday very well.  Currently has wound VAC in place.  Patient still complains of left foot pain. Patient's pain is local, 6-8/10, intermittent and does not radiate to other location, sharp and with some tingling. Can be controlled by pain meds. Dressing in place.  6/26.  Patient relatively stable.  Patient blood sugar still not controlled.  Patient require wound care and wound VAC.  I put  on consult for outpatient wound clinic.  Pending further recommendation from orthopedics.  Patient complains of foot pain. Patient's pain is local, 6-7/10, intermittent and does not radiate to other location, sharp and with some tingling. Can be controlled by pain meds. Dressing in place and wound VAC in place  6/27.  Patient has been pleasant, wound VAC in place and working appropriately.   continue to work with outpatient clinic appointment and wound VAC to be delivered as outpatient.  Patient complains of local pain. Patient's pain is local, 4-6/10, intermittent and does not radiate to other location, sharp and with some tingling. Can be controlled by pain meds. Dressing in place.  6/28. Patient's wound VAC is examined by LPS today.  Recommend continue monitor and wound VAC will be changed tomorrow.  And recommend to continue monitor until next Tuesday.  Patient still complains of pain. Patient's pain is local 3-4/10, intermittent and does not radiate to other location, sharp and with some tingling. Can be controlled by pain meds. Dressing in place.    Consultants/Specialty  Vascular surgery, LPS, orthopedics    Code Status  Full code    Disposition  To be determined    Review of Systems  Review of Systems   Constitutional: Negative for chills, diaphoresis and malaise/fatigue.   HENT: Negative for ear pain and hearing loss.    Eyes: Negative for blurred vision, double vision, pain and discharge.   Respiratory: Negative for cough, sputum production and wheezing.    Cardiovascular: Negative for chest pain, orthopnea, leg swelling and PND.   Gastrointestinal: Negative for diarrhea, nausea and vomiting.   Genitourinary: Negative for dysuria, frequency and hematuria.   Musculoskeletal: Positive for joint pain. Negative for falls, myalgias and neck pain.   Skin: Negative for rash.        Left foot unhealed ulcer   Neurological: Negative for dizziness, tingling, tremors and headaches.    Psychiatric/Behavioral: Negative for depression, substance abuse and suicidal ideas.        Physical Exam  Temp:  [36.3 °C (97.4 °F)-36.7 °C (98.1 °F)] 36.7 °C (98.1 °F)  Pulse:  [85-99] 98  Resp:  [15-16] 16  BP: (111-138)/(72-84) 138/84  SpO2:  [94 %-100 %] 95 %    Physical Exam   Constitutional: He is oriented to person, place, and time. He appears well-developed. No distress.   HENT:   Head: Atraumatic.   Right Ear: External ear normal.   Left Ear: External ear normal.   Eyes: Pupils are equal, round, and reactive to light. Conjunctivae and EOM are normal. Right eye exhibits no discharge. Left eye exhibits no discharge.   Neck: Normal range of motion. No thyromegaly present.   Cardiovascular: Normal rate, regular rhythm and normal heart sounds.  Exam reveals no gallop and no friction rub.    Pulmonary/Chest: Effort normal and breath sounds normal. No stridor. He has no wheezes. He exhibits no tenderness.   Abdominal: Soft. Bowel sounds are normal. He exhibits no distension and no mass. There is no rebound.   Musculoskeletal: Normal range of motion. He exhibits no tenderness.   Neurological: He is alert and oriented to person, place, and time. He displays normal reflexes. No cranial nerve deficit.   Skin: Skin is dry. No rash noted. No erythema.   Left foot and heel ulcer with surrounding erythema  Wound VAC in place   Psychiatric: He has a normal mood and affect.   Nursing note and vitals reviewed.      Fluids    Intake/Output Summary (Last 24 hours) at 06/28/19 1615  Last data filed at 06/28/19 1100   Gross per 24 hour   Intake              360 ml   Output                0 ml   Net              360 ml       Laboratory  Recent Labs      06/26/19   0404  06/28/19   0719   WBC  6.7  6.1   RBC  4.02*  4.09*   HEMOGLOBIN  12.0*  12.0*   HEMATOCRIT  36.8*  37.5*   MCV  91.5  91.7   MCH  29.9  29.3   MCHC  32.6*  32.0*   RDW  42.1  43.0   PLATELETCT  263  286   MPV  11.2  11.0     Recent Labs      06/26/19   0402   06/28/19   0719   SODIUM  134*  137   POTASSIUM  4.2  4.3   CHLORIDE  102  102   CO2  26  26   GLUCOSE  170*  159*   BUN  13  13   CREATININE  0.81  0.84   CALCIUM  9.3  9.4                   Imaging  IR-EXTREMITY ANGIOGRAM-UNILATERAL LEFT    (Results Pending)        Assessment/Plan  * Peripheral arterial disease (HCC)   Assessment & Plan        Patient has had an angiogram and angioplasty done tonight to the left lower extremity.  His pulses are not palpable on my exam however his foot is warm, his pain is much less, and they are the pulses are dopplerable.  Patient was evaluated by vascular surgery.    Currently has good perfusion however patient has unhealed ulcer disease.    Patient continue wound care   Appreciate LPS and orthopedics recommendation plan for surgery 6/24  Patient is a lifelong non-smoker  Discontinue antibiotics for now.  Continue wound care and wound VAC.  Patient will need wound VAC and wound clinic as outpatient.  Currently still pending wound VAC and wound clinic to be set up,  to help    Patient's wound VAC is examined by LPS 6/28.  Recommend continue monitor, and wound VAC will be changed 6/29.  And recommend to continue monitor until next Tuesday.  If patient wound looks worsen then will require infectious disease specialist comments on antibiotics     Wound infection- (present on admission)   Assessment & Plan    Vascular surgery Dr. Coates is following.  Consult limb preservation service  Foot gangrene, status post debridement, I discontinue patient's antibiotics given patient has no signs of sepsis or local signs of infection anymore.  Wound care to be continued, appreciate input.    wound debridement 6/24  Wound VAC currently in place continue wound care  Infection resolved currently off antibiotics.  We will reexamine wound tomorrow if wound looks bad and recommend infectious disease consultation for antibiotics choice     Hyperlipidemia- (present on admission)    Assessment & Plan    Continue statin     Uncontrolled type 2 diabetes mellitus with hyperglycemia (HCC)- (present on admission)   Assessment & Plan    Patient's most recent A1c was 12.3 implying very poor control.  He is maintained on glipizide and metformin as an outpatient.  I DCed oral DM meds for frequent n.p.o. status during the hospital stay and needs to control sugar better.  Cover with sliding scale  Patient blood sugar still not controlled, I increased Lantus to 20 units, titrate upwards as we have a better idea of how his sugars run in house  Diabetic education  I also ordered a hypoglycemic protocol to prevent hypoglycemic event    I increase short acting insulin 3 units 3 times a day, blood sugar better controlled     History of acute ischemic stroke (HCC)- (present on admission)   Assessment & Plan    Patient currently has no further symptoms  I continue aspirin and adding Plavix for reasons of his PAD.  I also continue patient with statin.          VTE prophylaxis: Heparin subcu.      Current Facility-Administered Medications:   •  insulin regular (HUMULIN R) injection 3 Units, 3 Units, Subcutaneous, TID AC, Judy Henry M.D., 3 Units at 06/28/19 1313  •  insulin glargine (LANTUS) injection 20 Units, 20 Units, Subcutaneous, Q EVENING, Judy Henry M.D., 20 Units at 06/27/19 1744  •  insulin regular (HUMULIN R) injection 2-9 Units, 2-9 Units, Subcutaneous, TID AC, 3 Units at 06/28/19 1311 **AND** Accu-Chek ACHS, , , Q AC AND BEDTIME(S) **AND** NOTIFY MD and PharmD, , , Once **AND** glucose 4 g chewable tablet 16 g, 16 g, Oral, Q15 MIN PRN **AND** DEXTROSE 10% BOLUS 250 mL, 250 mL, Intravenous, Q15 MIN PRN, Judy Henry M.D.  •  aspirin EC (ECOTRIN) tablet 81 mg, 81 mg, Oral, DAILY, Tommie Greenfield D.O., 81 mg at 06/28/19 0637  •  atorvastatin (LIPITOR) tablet 40 mg, 40 mg, Oral, Q EVENING, Tommie Greenfield D.O., 40 mg at 06/27/19 1739  •  clopidogrel (PLAVIX) tablet 75 mg, 75 mg, Oral, DAILY,  GIL Devine.O., 75 mg at 06/28/19 0637  •  senna-docusate (PERICOLACE or SENOKOT S) 8.6-50 MG per tablet 2 Tab, 2 Tab, Oral, BID **AND** polyethylene glycol/lytes (MIRALAX) PACKET 1 Packet, 1 Packet, Oral, QDAY PRN **AND** magnesium hydroxide (MILK OF MAGNESIA) suspension 30 mL, 30 mL, Oral, QDAY PRN **AND** bisacodyl (DULCOLAX) suppository 10 mg, 10 mg, Rectal, QDAY PRN, Tommie Greenfield D.O.  •  Respiratory Care per Protocol, , Nebulization, Continuous RT, GIL Devine.O.  •  acetaminophen (TYLENOL) tablet 650 mg, 650 mg, Oral, Q6HRS PRN, GIL Devine.O.  •  ondansetron (ZOFRAN) syringe/vial injection 4 mg, 4 mg, Intravenous, Q4HRS PRN, Tommie Greenfield D.O.  •  ondansetron (ZOFRAN ODT) dispertab 4 mg, 4 mg, Oral, Q4HRS PRN, GIL Devine.O.  •  promethazine (PHENERGAN) tablet 12.5-25 mg, 12.5-25 mg, Oral, Q4HRS PRN, Tommie Greenfield D.O.  •  promethazine (PHENERGAN) suppository 12.5-25 mg, 12.5-25 mg, Rectal, Q4HRS PRN, GIL Devine.O.  •  prochlorperazine (COMPAZINE) injection 5-10 mg, 5-10 mg, Intravenous, Q4HRS PRN, GIL Devine.O.  •  oxyCODONE immediate-release (ROXICODONE) tablet 5-10 mg, 5-10 mg, Oral, Q4HRS PRN, GIL Devine.O., 10 mg at 06/27/19 3837  •  heparin injection 5,000 Units, 5,000 Units, Subcutaneous, Q8HRS, Tommie Greenfield D.O., 5,000 Units at 06/28/19 5128

## 2019-06-28 NOTE — THERAPY
"Physical Therapy Evaluation completed.   Bed Mobility:  Supine to Sit: Supervised  Transfers: Sit to Stand: Supervised  Gait: Level Of Assist: Supervised with Front-Wheel Walker       Plan of Care: Patient with no further skilled PT needs in the acute care setting at this time  Discharge Recommendations: Equipment: Front-Wheel Walker. Post-acute therapy Currently anticipate no further skilled therapy needs once patient is discharged from the inpatient setting.    See \"Rehab Therapy-Acute\" Patient Summary Report for complete documentation.     "

## 2019-06-28 NOTE — PROGRESS NOTES
LIMB PRESERVATION SERVICE      HPI:  54 y/o male with DM. Admitted 6/21/19 for PVD.   LPS consulted for L dorsal foot ulcers.   Pt reports he was in [place], wore leather shoes that were too tight and developed blisters in March 2019. He cleaned ulcers with hydrogen peroxide but the ulcers worsened. He went to urgent care, was referred to wound clinic in May 2019 and arterial studies were ordered. He did not complete studies until 5/24. Once studies were completed, he was scheduled to be seen by Dr. Graham at the clinic to discuss results however pt went out of town and was not seen until 6/17. After seeing Dr. Graham, direct admission was arranged for surgery but pt had difficulty grasping the seriousness of his condition. He did not present to hospital until 6/21/19.       Diagnosed with DM 15 years ago. Checks daily. Averages 1000-110s. Controlled on orals. Has numbness to feet. Wears sports shoes. Does not have diabetic shoes. Has been seen by CDE.       SURGERY DATE: 6/21/19 by Dr. Chatterjee    PROCEDURE:   Left dorsalis pedis angioplasty  Left anterior tibial artery angioplasty and atherectomy  Left posterior tibial artery angioplasty     SURGERY DATE: 6/24/19 by Dr. Chopra    PROCEDURE:   1.  Left foot irrigation and debridement, multiple compartments, including   anterior foot and toes.  2.  Left placement of skin substitute.  3.  Left placement of wound VAC.           6/27:Patient denies fevers, chills, nausea, vomiting.  Pain well controlled. Concerned about elevated blood sugars here and diet options. Has been seen by CDE this admission.     /83   Pulse 91   Temp 36.4 °C (97.6 °F) (Temporal)   Resp 18   Ht 1.829 m (6')   Wt 76.2 kg (167 lb 15.9 oz)   SpO2 93%   BMI 22.78 kg/m²     SURGICAL SITE ASSESSMENT:    Pedal Pulses: unable to palpate DP, PT pulses on R foot. +popliteal  KEVIN pulses on L foot d/t dressing.   Foot warm    VAC functioning to L foot, ace wrap in place       DIABETES  MANAGEMENT:  Blood glucose: 207  A1c:   Lab Results   Component Value Date/Time    HBA1C 12.3 (H) 06/15/2018 10:20 PM        Diabetes education: seen patient on 6/25/19    INFECTION MANAGEMENT:  WBC: 6.7  Wound culture results:   Results     ** No results found for the last 168 hours. **                 PLAN:  POD # 6 s/p angioplasty of L DP, AT, PT by Dr. Chatterjee  POD # 3 s/p L foot I & D with Epifix and VAC placement by Dr. Chopra      Wound care: wound team to change VAC on Saturday    Antibiotics: None monitor for s/s of infection    Weight Bearing Status: Weight bearing as tolerated to LLE    Offloading: Offloading shoe . Wife to bring in tomorrow    PT Consult: Yes involved    Diabetes Education: seen pt on 6/25/19. a1c in 6/2018 ws 12.3%. Check new a1c.   Consult RD. Pt with questions regarding diabetic diet and being vegetarian.     Plan to return to O.R.: possible [facility]      DISCHARGE PLAN:    Disposition: not cleared for discharge. VAC change on Saturday;. Will determine if pt requires further surgery.   If not please arrange for HH and outpt wound care clinic. Spoke with Sarah KEENAN at wound clinic. Pt tentatively scheduled for 7/5/19. Will place authorization for Epifix/Acell to continue biologic therapy in OP setting.     Please arrange for home wound VAC    Follow-up: OP Wound Clinic. And LPS rounds for 7/12/19., wound clinic referral already in place          TRINIDAD Ramos.    If any questions or concerns, please call [phone]

## 2019-06-28 NOTE — PROGRESS NOTES
Assumed care at 1845. Pt resting in bed. A&ox 4  Left foot wound vac in place. CDI  +PT on left foot with doppler  O2 on RA  Tolerating diet  accucheck ac/hs  Voiding adequately  BM today  Pain controlled with oxy prn  Call light within reach. Hourly rounding in place

## 2019-06-28 NOTE — CARE PLAN
Problem: Mobility  Goal: Risk for activity intolerance will decrease  Outcome: PROGRESSING AS EXPECTED  Pt allowed to be OOB WBAT LLE with off loading shoe. Pt's wife supposed to bring shoe today. To encourage OOB mobility.    Problem: Skin Integrity  Goal: Risk for impaired skin integrity will decrease  Outcome: PROGRESSING AS EXPECTED  Pt with drsg and wound vac to LLE, followed by WC and LPS.

## 2019-06-28 NOTE — CARE PLAN
Problem: Safety  Goal: Will remain free from injury  Updated about POc. Reinforce call light use. Pt acknowledged understanding    Problem: Venous Thromboembolism (VTW)/Deep Vein Thrombosis (DVT) Prevention:  Goal: Patient will participate in Venous Thrombosis (VTE)/Deep Vein Thrombosis (DVT)Prevention Measures  Refusing SCDs. Hep subq given

## 2019-06-28 NOTE — DISCHARGE PLANNING
Anticipated Discharge Disposition: Home with outpatient wound vac    Action: RN CM received notification patient has been accepted at [facility] ([place] office) and can be seen on Monday, July 1, at 10 am.  CT requesting clinicals be faxed to their office.     Barriers to Discharge: Wound consult/dressing change pending  Medical clearance    Plan: Clinicals to be faxed upon completion of first wound vac dressing change by wound/LPS.      Addendum:  Spoke with patient at bedside.  Pt reports he would prefer to go to [facility] clinic at discharge.  Pt also requests a home health referral at discharge as well.  Request will be forwarded to MD for consideration.   Cancelled Wound care appointment with [facility].  Pt has scheduled appointment with [facility] on 7/5/2019 at 2pm.

## 2019-06-28 NOTE — DIETARY
"Nutrition Services:  Brief Update    Consult received for \"Options for diet being vegetarian and diabetic. Concerned about elevated blood sugars while IP\".   RD previously provided education handouts for vegetarian eating with Diabetes 6/25.  Spoke with pt and wife at bedside.  Pt and wife interested in vegetarian options for pt to eat at home.  Discussed current foods consumed at home, all of which were appropriate for Diabetes.  Provided list of vegetarian protein sources, as well as a list of fruits and proper serving sizes.  Provided pt with vegetarian menu and snack list to make appropriate selections while in-patient.    Consult RD as needed.      "

## 2019-06-29 LAB
ANION GAP SERPL CALC-SCNC: 10 MMOL/L (ref 0–11.9)
BASOPHILS # BLD AUTO: 0.5 % (ref 0–1.8)
BASOPHILS # BLD: 0.03 K/UL (ref 0–0.12)
BUN SERPL-MCNC: 20 MG/DL (ref 8–22)
CALCIUM SERPL-MCNC: 9 MG/DL (ref 8.5–10.5)
CHLORIDE SERPL-SCNC: 101 MMOL/L (ref 96–112)
CO2 SERPL-SCNC: 24 MMOL/L (ref 20–33)
CREAT SERPL-MCNC: 0.76 MG/DL (ref 0.5–1.4)
EOSINOPHIL # BLD AUTO: 0.07 K/UL (ref 0–0.51)
EOSINOPHIL NFR BLD: 1.2 % (ref 0–6.9)
ERYTHROCYTE [DISTWIDTH] IN BLOOD BY AUTOMATED COUNT: 43.7 FL (ref 35.9–50)
GLUCOSE BLD-MCNC: 170 MG/DL (ref 65–99)
GLUCOSE BLD-MCNC: 180 MG/DL (ref 65–99)
GLUCOSE BLD-MCNC: 259 MG/DL (ref 65–99)
GLUCOSE SERPL-MCNC: 248 MG/DL (ref 65–99)
HCT VFR BLD AUTO: 36 % (ref 42–52)
HGB BLD-MCNC: 11.6 G/DL (ref 14–18)
IMM GRANULOCYTES # BLD AUTO: 0.02 K/UL (ref 0–0.11)
IMM GRANULOCYTES NFR BLD AUTO: 0.3 % (ref 0–0.9)
LYMPHOCYTES # BLD AUTO: 2.06 K/UL (ref 1–4.8)
LYMPHOCYTES NFR BLD: 34.9 % (ref 22–41)
MCH RBC QN AUTO: 30.1 PG (ref 27–33)
MCHC RBC AUTO-ENTMCNC: 32.2 G/DL (ref 33.7–35.3)
MCV RBC AUTO: 93.5 FL (ref 81.4–97.8)
MONOCYTES # BLD AUTO: 0.54 K/UL (ref 0–0.85)
MONOCYTES NFR BLD AUTO: 9.1 % (ref 0–13.4)
NEUTROPHILS # BLD AUTO: 3.19 K/UL (ref 1.82–7.42)
NEUTROPHILS NFR BLD: 54 % (ref 44–72)
NRBC # BLD AUTO: 0 K/UL
NRBC BLD-RTO: 0 /100 WBC
PLATELET # BLD AUTO: 280 K/UL (ref 164–446)
PMV BLD AUTO: 11.2 FL (ref 9–12.9)
POTASSIUM SERPL-SCNC: 4.3 MMOL/L (ref 3.6–5.5)
RBC # BLD AUTO: 3.85 M/UL (ref 4.7–6.1)
SODIUM SERPL-SCNC: 135 MMOL/L (ref 135–145)
WBC # BLD AUTO: 5.9 K/UL (ref 4.8–10.8)

## 2019-06-29 PROCEDURE — 85025 COMPLETE CBC W/AUTO DIFF WBC: CPT

## 2019-06-29 PROCEDURE — 700102 HCHG RX REV CODE 250 W/ 637 OVERRIDE(OP): Performed by: HOSPITALIST

## 2019-06-29 PROCEDURE — 700102 HCHG RX REV CODE 250 W/ 637 OVERRIDE(OP): Performed by: INTERNAL MEDICINE

## 2019-06-29 PROCEDURE — A9270 NON-COVERED ITEM OR SERVICE: HCPCS | Performed by: HOSPITALIST

## 2019-06-29 PROCEDURE — 36415 COLL VENOUS BLD VENIPUNCTURE: CPT

## 2019-06-29 PROCEDURE — 99232 SBSQ HOSP IP/OBS MODERATE 35: CPT | Performed by: INTERNAL MEDICINE

## 2019-06-29 PROCEDURE — 97605 NEG PRS WND THER DME<=50SQCM: CPT

## 2019-06-29 PROCEDURE — 80048 BASIC METABOLIC PNL TOTAL CA: CPT

## 2019-06-29 PROCEDURE — 770006 HCHG ROOM/CARE - MED/SURG/GYN SEMI*

## 2019-06-29 PROCEDURE — 700111 HCHG RX REV CODE 636 W/ 250 OVERRIDE (IP): Performed by: HOSPITALIST

## 2019-06-29 PROCEDURE — 82962 GLUCOSE BLOOD TEST: CPT | Mod: 91

## 2019-06-29 RX ORDER — INSULIN GLARGINE 100 [IU]/ML
22 INJECTION, SOLUTION SUBCUTANEOUS EVERY EVENING
Status: DISCONTINUED | OUTPATIENT
Start: 2019-06-29 | End: 2019-07-02 | Stop reason: HOSPADM

## 2019-06-29 RX ADMIN — HEPARIN SODIUM 5000 UNITS: 5000 INJECTION, SOLUTION INTRAVENOUS; SUBCUTANEOUS at 14:59

## 2019-06-29 RX ADMIN — CLOPIDOGREL BISULFATE 75 MG: 75 TABLET ORAL at 06:04

## 2019-06-29 RX ADMIN — ASPIRIN 81 MG: 81 TABLET, COATED ORAL at 06:04

## 2019-06-29 RX ADMIN — OXYCODONE HYDROCHLORIDE 5 MG: 5 TABLET ORAL at 22:06

## 2019-06-29 RX ADMIN — HEPARIN SODIUM 5000 UNITS: 5000 INJECTION, SOLUTION INTRAVENOUS; SUBCUTANEOUS at 06:04

## 2019-06-29 RX ADMIN — INSULIN GLARGINE 22 UNITS: 100 INJECTION, SOLUTION SUBCUTANEOUS at 18:21

## 2019-06-29 RX ADMIN — HEPARIN SODIUM 5000 UNITS: 5000 INJECTION, SOLUTION INTRAVENOUS; SUBCUTANEOUS at 22:05

## 2019-06-29 RX ADMIN — ATORVASTATIN CALCIUM 40 MG: 40 TABLET, FILM COATED ORAL at 18:14

## 2019-06-29 ASSESSMENT — LIFESTYLE VARIABLES: SUBSTANCE_ABUSE: 0

## 2019-06-29 ASSESSMENT — ENCOUNTER SYMPTOMS
DOUBLE VISION: 0
DIARRHEA: 0
DEPRESSION: 0
CHILLS: 0
FEVER: 0
DIAPHORESIS: 0
ORTHOPNEA: 0
BACK PAIN: 0
VOMITING: 0
DIZZINESS: 0
PND: 0
NECK PAIN: 0
COUGH: 0
BLURRED VISION: 0
WHEEZING: 0
HEADACHES: 0
SPUTUM PRODUCTION: 0
PHOTOPHOBIA: 0
FALLS: 0
SPEECH CHANGE: 0
HEARTBURN: 0
EYE DISCHARGE: 0

## 2019-06-29 NOTE — CARE PLAN
Problem: Safety  Goal: Will remain free from falls  Outcome: PROGRESSING AS EXPECTED  Pt remains free from fall at this time.  Pt educated on fall risk.  Pt demonstrates understanding by appropriate use of call light to call for assistance.  CLIP, hourly rounding in place    Problem: Venous Thromboembolism (VTW)/Deep Vein Thrombosis (DVT) Prevention:  Goal: Patient will participate in Venous Thrombosis (VTE)/Deep Vein Thrombosis (DVT)Prevention Measures   06/28/19 2056   Mechanical/VTE Prophylaxis   Mechanical Prophylaxis  SCDs, Sequential Compression Device   SCDs, Sequential Compression Device Refused  (Pt refused despite of education)   OTHER   Risk Assessment Score 3   VTE RISK High   Pharmacologic Prophylaxis Used Unfractionated Heparin

## 2019-06-29 NOTE — PROGRESS NOTES
LIMB PRESERVATION SERVICE      HPI:  52 y/o male with DM. Admitted 6/21/19 for PVD.   LPS consulted for L dorsal foot ulcers.   Pt reports he was in [place], wore leather shoes that were too tight and developed blisters in March 2019. He cleaned ulcers with hydrogen peroxide but the ulcers worsened. He went to urgent care, was referred to wound clinic in May 2019 and arterial studies were ordered. He did not complete studies until 5/24. Once studies were completed, he was scheduled to be seen by Dr. Graham at the clinic to discuss results however pt went out of town and was not seen until 6/17. After seeing Dr. Graham, direct admission was arranged for surgery but pt had difficulty grasping the seriousness of his condition. He did not present to hospital until 6/21/19.       Diagnosed with DM 15 years ago. Checks daily. Averages 1000-110s. Controlled on orals. Has numbness to feet. Wears sports shoes. Does not have diabetic shoes. Has been seen by CDE.       SURGERY DATE: 6/21/19 by Dr. Chatterjee    PROCEDURE:   Left dorsalis pedis angioplasty  Left anterior tibial artery angioplasty and atherectomy  Left posterior tibial artery angioplasty     SURGERY DATE: 6/24/19 by Dr. Chopra    PROCEDURE:   1.  Left foot irrigation and debridement, multiple compartments, including   anterior foot and toes.  2.  Left placement of skin substitute.  3.  Left placement of wound VAC.           6/27:Patient denies fevers, chills, nausea, vomiting.  Pain well controlled. Concerned about elevated blood sugars here and diet options. Has been seen by CDE this admission.   6/28: Patient's wife has brought in his offloading shoe from home.  Wound VAC functioning.  Seen by dietitian.      /84   Pulse 98   Temp 36.7 °C (98.1 °F) (Temporal)   Resp 16   Ht 1.829 m (6')   Wt 76.2 kg (167 lb 15.9 oz)   SpO2 95%   BMI 22.78 kg/m²     SURGICAL SITE ASSESSMENT:    Pedal Pulses: unable to palpate DP, PT pulses on R foot. +popliteal  KEVIN  pulses on L foot d/t dressing.   Foot warm    VAC functioning to L foot, ace wrap in place       DIABETES MANAGEMENT:  Blood glucose: 121  A1c:   Lab Results   Component Value Date/Time    HBA1C 8.9 (H) 06/28/2019 07:19 AM        Diabetes education: seen patient on 6/25/19    INFECTION MANAGEMENT:  WBC: 6.7  Wound culture results:   Results     ** No results found for the last 168 hours. **                 PLAN:  POD # 7 s/p angioplasty of L DP, AT, PT by Dr. Chatterjee  POD # 4 s/p L foot I & D with Epifix and VAC placement by Dr. Chopra      Wound care: wound team to change VAC on Saturday    Antibiotics: None monitor for s/s of infection at VAC change tomorrow.  Recommend antibiotics if needed.    Weight Bearing Status: Weight bearing as tolerated to LLE    Offloading: Offloading shoe .  To be worn with ambulation    PT Consult:  involved    Diabetes Education: seen pt on 6/25/19. a1c in 6/2018 ws 12.3%.  A1c this admission 8.9%  Seen by RD.    Plan to return to O.R.: No plans for further surgeries this admission      DISCHARGE PLAN:    Disposition: not cleared for discharge. VAC change on Saturday, Tuesday.   authorization for Epifix/Acell to continue biologic therapy in OP setting ordered    Please arrange for home wound VAC    Follow-up: OP Wound Clinic.  Wound care appointment scheduled for 7/5/2019.  And LPS rounds for 7/12/19., wound clinic referral already in place    D/W: Patient, Tricia Redman RN Case manager, Dr. Carl Rankin, A.P.R.N.    If any questions or concerns, please call [phone]

## 2019-06-29 NOTE — WOUND TEAM
"[facility]  Inpatient Services  Initial Wound and Skin Care Evaluation    Admission Date:  6/21/2019   HPI, PMH, SH: Reviewed  Unit where seen by Wound Team: [id number]    WOUND CONSULT RELATED TO:  Left foot and toe wounds    SUBJECTIVE:  \"I have more feeling in the foot now\"      Self Report / Pain Level:  With palpation to plantar foot     OBJECTIVE:  Pt in bed with wife and son bedside, pt able to move on his own.  Pt is ambulating.  Clarified with pt that he needs to wear off loading shoe with ambulation at all times.  VAC in place    WOUND TYPE, LOCATION, CHARACTERISTICS (Pressure ulcers: location, stage, POA or date identified)        Wound 06/22/19 LEFT FOOT (Active)   Wound Image     6/22/2019  5:00 PM   Site Assessment Red;Yellow 6/29/2019  2:00 PM   Angelica-wound Assessment Maceration 6/29/2019  2:00 PM   Margins KEVIN 6/29/2019  8:26 AM   Wound Length (cm) 10 cm 6/29/2019  2:00 PM   Wound Width (cm) 5 cm 6/29/2019  2:00 PM   Wound Surface Area (cm^2) 50 cm^2 6/29/2019  2:00 PM   Tunneling 0 cm 6/29/2019  2:00 PM   Undermining 0 cm 6/29/2019  2:00 PM   Closure Secondary intention 6/29/2019  2:00 PM   Drainage Amount Small 6/29/2019  2:00 PM   Drainage Description Serosanguineous 6/29/2019  2:00 PM   Non-staged Wound Description Full thickness 6/29/2019  2:00 PM   Treatments Site care 6/29/2019  2:00 PM   Dressing Options Wound Vac;Petroleum Gauze (clear) 6/29/2019  2:00 PM   Dressing Cleansing/Solutions Not Applicable 6/29/2019  8:26 AM   Dressing Changed Changed 6/29/2019  2:00 PM   Dressing Status Clean;Dry;Intact 6/29/2019  8:26 AM   Dressing Change Frequency Tuesday, Thursday, Saturday 6/29/2019  2:00 PM   NEXT Dressing Change  07/02/19 6/29/2019  2:00 PM   NEXT Weekly Photo (Inpatient Only) 07/06/19 6/29/2019  2:00 PM   WOUND NURSE ONLY - Odor Mild 6/29/2019  2:00 PM   WOUND NURSE ONLY - Pulses PT;1+;Left 6/29/2019  2:00 PM   WOUND NURSE ONLY - Exposed Structures Tendon 6/22/2019  5:00 PM "   WOUND NURSE ONLY - Tissue Type and Percentage red and yellow visable under adpatic 6/29/2019  2:00 PM   WOUND NURSE ONLY - Time Spent with Patient (mins) 75 6/29/2019  2:00 PM       Wound 06/29/19 Diabetic Ulcer Toe (Comment which one) 2nd and 3rd toes (Active)   Site Assessment Brown;Dry 6/29/2019  2:00 PM   Irma-wound Assessment Dry 6/29/2019  2:00 PM   Margins Undefined edges 6/29/2019  2:00 PM   Tunneling 0 cm 6/29/2019  2:00 PM   Undermining 0 cm 6/29/2019  2:00 PM   Closure Secondary intention 6/29/2019  2:00 PM   Drainage Amount None 6/29/2019  2:00 PM   Treatments Site care;Cleansed 6/29/2019  2:00 PM   Cleansing Approved Wound Cleanser 6/29/2019  2:00 PM   Dressing Options Open to Air 6/29/2019  2:00 PM   NEXT Weekly Photo (Inpatient Only) 07/06/19 6/29/2019  2:00 PM   WOUND NURSE ONLY - Odor Mild 6/29/2019  2:00 PM   WOUND NURSE ONLY - Exposed Structures None 6/29/2019  2:00 PM   WOUND NURSE ONLY - Tissue Type and Percentage 100% dry brown 6/29/2019  2:00 PM          Vascular: left ankle by hand held doppler 6/29/19    Dorsal Pedal pulses:  Unable to locate - attempted to find after dressing applied and this interfered with accessing foot  Posterior tib pulses:  1+ mono/multi phasic    ALYSSA:     nt    Lab Values:    WBC:       WBC   Date/Time Value Ref Range Status   06/29/2019 02:20 AM 5.9 4.8 - 10.8 K/uL Final     AIC:      Lab Results   Component Value Date/Time    HBA1C 8.9 (H) 06/28/2019 07:19 AM         Culture:   Completed nt    INTERVENTIONS BY WOUND TEAM:  Dressing removed preserving adaptic to maintain epifix graft on wound bed placed in OR.  Cleaned irma wound with wound cleanser, debrided loose macerated skin with scissors and tweezers.  Placed aqag on dorsal irma aspect due to maceraton and small wounds, covered irma wound with drape.  Placed black foam over preserved adaptic covered with drape and achieved a seal to allow 125 mmHg continuous.  Placed cotton roll and ACE loose over foot with  tubing coming out of ACE so as not to be pressed into tissue    Interdisciplinary consultation:  RN, patient, updated Dr Chopra    EVALUATION:   Wound appears to be forming granular tissue although wound bed not fully visualize due to adaptic.      Factors affecting wound healing:  PVD, DM  Goals:  Steady decrease in wound area and depth weekly     NURSING PLAN OF CARE ORDERS (X):    Dressing changes: See Dressing Care orders:   X  Skin care: See Skin Care orders:   Rectal tube care: See Rectal Tube Care orders:   Other orders:    RSKIN: CURRENT (X) ORDERED (O)  Q shift Nakul:  X  Q shift pressure point assessments:  X  Pressure redistribution mattress        Waffle overlay  SULEMA      Bariatric SULEMA      Bariatric foam        Heel float boots     X  Heels floated on pillows      Barrier wipes      Barrier Cream      Barrier paste      Sacral silicone dressing      Silicone O2 tubing      Anchorfast      Trach with Optifoam split foam       Waffle cushion      Rectal tube or BMS      Antifungal tx    Turn q 2 hours     Up to chair     Ambulate   PT/OT     Dietician      PO     TF   TPN   NPO   # days   Other       WOUND TEAM PLAN OF CARE (X):   NPWT change 3 x week:      X  Dressing changes by wound team:       Follow up as needed:       Other (explain):    Anticipated discharge plans (X):  SNF:           Home Care:           Outpatient Wound Center:     X pt will require OP wound care for left foot VAC changes upon discharge       Self Care:            Other:

## 2019-06-29 NOTE — CARE PLAN
Problem: Safety  Goal: Will remain free from injury  Outcome: PROGRESSING AS EXPECTED  Educated to dangle at bedside prior to getting out of bed    Problem: Pain Management  Goal: Pain level will decrease to patient's comfort goal  Outcome: PROGRESSING AS EXPECTED  Medicate per MAR prn

## 2019-06-29 NOTE — PROGRESS NOTES
Assumed care of patient at 1900    Pt is A&O X 4  Pain 0/10  Pt denies pain at this time  Pt denies nausea  Tolerating Diet  Lt foot wound.  Wound vac in place.  Set to suction at 125.  Patent no leaks  Positive Urine Void   Positive Flatus  Positive BM Void  Up self   SCD's refused despite pt education.  Pt verbalized understanding of education  Bed in lowest position and locked.  Bed alarm not indicated per Mendy Willingham Assessment.   Reviewed plan of care with patient, bed in lowest position and locked, pt resting comfortably now, call light within reach, all needs met at this time

## 2019-06-29 NOTE — PROGRESS NOTES
Assumed care of patient from night shift RN.  Patient is alert and oriented times 4, states pain of 3/10, declines intervention at this time.  VSS /82   Pulse 82   Temp 36.9 °C (98.5 °F) (Temporal)   Resp 15   Ht 1.829 m (6')   Wt 76.2 kg (167 lb 15.9 oz)   SpO2 93%   BMI 22.78 kg/m²   PIV in the RAC, patent and saline locked.  On RA with saturations in the mid 90s.  Last BM 6/28, urinating without difficulty.  Diabetic vegetarian diet, tolerating well.  Wound vac to the L foot, functioning correctly.  Ace wrap in use.  Patient is up self with a FWW, demonstrates steady gait, no assistance needed.  POC discussed for the day, wound vac change today.  Bed is locked and in the lowest position, call light is within reach.  All needs are met at this time, hourly rounding is in place.

## 2019-06-29 NOTE — PROGRESS NOTES
[facility] Medicine Daily Progress Note    Date of Service  6/29/2019    Chief Complaint  53 y.o. male admitted 6/21/2019 with complains of left foot and heel ulcer    Hospital Course    Past medical history of PAD, diabetes uncontrolled, presented with complains of left foot and heel ulcer.  Patient was evaluated by vascular surgery and underwent angiogram and angioplasty.  Patient was put on antiplatelet therapy understanding.  Patient was also put on insulin for diabetes control.  Patient will have orthopedics and LPS evaluation.        Interval Problem Update  6/22.  Patient has been stable and comfortable overnight but still complains of left foot pain. Patient's pain is local, 6-8,/10, intermittent and does not radiate to other location, sharp and with some tingling. Can be controlled by pain meds. Dressing in place.  6/23. p patient still complains of lower extremity wound pain.  But pain can be controlled. Patient's pain is local 5-6/10, intermittent and does not radiate to other location, sharp and with some tingling. Can be controlled by pain meds. Dressing in place.  Still pending LPS recommendation.  6/24. Patient has been pleasant during the hospital stay and no significant overnight event.  Patient complains of lower extremity pain.  Patient was evaluated by orthopedics and will have surgery debridement this afternoon. Patient's pain is local 4-5/10, intermittent and does not radiate to other location, sharp and with some tingling. Can be controlled by pain meds. Dressing in place.  6/25.  Patient tolerated procedure yesterday very well.  Currently has wound VAC in place.  Patient still complains of left foot pain. Patient's pain is local, 6-8/10, intermittent and does not radiate to other location, sharp and with some tingling. Can be controlled by pain meds. Dressing in place.  6/26.  Patient relatively stable.  Patient blood sugar still not controlled.  Patient require wound care and wound VAC.  I put  on consult for outpatient wound clinic.  Pending further recommendation from orthopedics.  Patient complains of foot pain. Patient's pain is local, 6-7/10, intermittent and does not radiate to other location, sharp and with some tingling. Can be controlled by pain meds. Dressing in place and wound VAC in place  6/27.  Patient has been pleasant, wound VAC in place and working appropriately.   continue to work with outpatient clinic appointment and wound VAC to be delivered as outpatient.  Patient complains of local pain. Patient's pain is local, 4-6/10, intermittent and does not radiate to other location, sharp and with some tingling. Can be controlled by pain meds. Dressing in place.  6/28. Patient's wound VAC is examined by LPS today.  Recommend continue monitor and wound VAC will be changed tomorrow.  And recommend to continue monitor until next Tuesday.  Patient still complains of pain. Patient's pain is local 3-4/10, intermittent and does not radiate to other location, sharp and with some tingling. Can be controlled by pain meds. Dressing in place.  6/29.  Patient has been stable overnight and no significant overnight event.  Patient wound VAC will be replaced today.  Patient complains of lower extremity pain. Patient otherwise denies fever, chills, nausea, vomiting, adb pain, SOB, CP, headache, constipation, diarrhea, cough, or sputum.      Consultants/Specialty  Vascular surgery, LPS, orthopedics    Code Status  Full code    Disposition  To be determined    Review of Systems  Review of Systems   Constitutional: Negative for chills, diaphoresis, fever and malaise/fatigue.   HENT: Negative for ear pain and hearing loss.    Eyes: Negative for blurred vision, double vision, photophobia and discharge.   Respiratory: Negative for cough, sputum production and wheezing.    Cardiovascular: Negative for chest pain, orthopnea, leg swelling and PND.   Gastrointestinal: Negative for diarrhea, heartburn and  vomiting.   Genitourinary: Negative for dysuria, frequency and hematuria.   Musculoskeletal: Positive for joint pain. Negative for back pain, falls and neck pain.   Skin: Negative for rash.        Left foot unhealed ulcer   Neurological: Negative for dizziness, speech change and headaches.   Psychiatric/Behavioral: Negative for depression and substance abuse.        Physical Exam  Temp:  [36.1 °C (97 °F)-36.9 °C (98.5 °F)] 36.9 °C (98.5 °F)  Pulse:  [82-98] 82  Resp:  [15-16] 15  BP: (119-138)/(76-84) 127/82  SpO2:  [93 %-98 %] 93 %    Physical Exam   Constitutional: He is oriented to person, place, and time. He appears well-nourished. No distress.   HENT:   Head: Atraumatic.   Right Ear: External ear normal.   Left Ear: External ear normal.   Eyes: Pupils are equal, round, and reactive to light. Conjunctivae and EOM are normal. Right eye exhibits no discharge. Left eye exhibits no discharge.   Neck: Normal range of motion. No thyromegaly present.   Cardiovascular: Normal rate and regular rhythm.  Exam reveals no gallop and no friction rub.    Pulmonary/Chest: Effort normal and breath sounds normal. No stridor. He has no wheezes. He has no rales. He exhibits no tenderness.   Abdominal: Soft. Bowel sounds are normal. He exhibits no distension and no mass. There is no rebound and no guarding.   Musculoskeletal: Normal range of motion. He exhibits no tenderness.   Neurological: He is alert and oriented to person, place, and time. He displays normal reflexes. No cranial nerve deficit.   Skin: Skin is dry. He is not diaphoretic. No erythema.   Left foot and heel ulcer with surrounding erythema  Wound VAC in place   Psychiatric: He has a normal mood and affect.   Nursing note and vitals reviewed.      Fluids    Intake/Output Summary (Last 24 hours) at 06/29/19 1455  Last data filed at 06/29/19 0400   Gross per 24 hour   Intake              750 ml   Output                0 ml   Net              750 ml        Laboratory  Recent Labs      06/28/19   0719  06/29/19   0220   WBC  6.1  5.9   RBC  4.09*  3.85*   HEMOGLOBIN  12.0*  11.6*   HEMATOCRIT  37.5*  36.0*   MCV  91.7  93.5   MCH  29.3  30.1   MCHC  32.0*  32.2*   RDW  43.0  43.7   PLATELETCT  286  280   MPV  11.0  11.2     Recent Labs      06/28/19   0719  06/29/19 0220   SODIUM  137  135   POTASSIUM  4.3  4.3   CHLORIDE  102  101   CO2  26  24   GLUCOSE  159*  248*   BUN  13  20   CREATININE  0.84  0.76   CALCIUM  9.4  9.0                   Imaging  IR-EXTREMITY ANGIOGRAM-UNILATERAL LEFT    (Results Pending)        Assessment/Plan  * Peripheral arterial disease (HCC)   Assessment & Plan        Patient has had an angiogram and angioplasty done tonight to the left lower extremity.  His pulses are not palpable on my exam however his foot is warm, his pain is much less, and they are the pulses are dopplerable.  Patient was evaluated by vascular surgery.    Currently has good perfusion however patient has unhealed ulcer disease.    Patient continue wound care   Appreciate LPS and orthopedics recommendation plan for surgery 6/24  Patient is a lifelong non-smoker  Discontinue antibiotics for now.  Continue wound care and wound VAC.  Patient will need wound VAC and wound clinic as outpatient.  Currently still pending wound VAC and wound clinic to be set up,  to help    Patient's wound VAC is examined by LPS 6/28.  Recommend continue monitor, and wound VAC will be changed 6/29.  And recommend to continue monitor until next Tuesday.  If patient wound looks worsen then will require infectious disease specialist comments on antibiotics     Wound infection- (present on admission)   Assessment & Plan    Vascular surgery Dr. Coates is following.  Consult limb preservation service  Foot gangrene, status post debridement, I discontinue patient's antibiotics given patient has no signs of sepsis or local signs of infection anymore.  Wound care to be continued,  appreciate input.    wound debridement 6/24  Wound VAC currently in place continue wound care  Infection resolved currently off antibiotics.  We will reexamine wound tomorrow if wound looks bad and recommend infectious disease consultation for antibiotics choice     Hyperlipidemia- (present on admission)   Assessment & Plan    Continue statin     Uncontrolled type 2 diabetes mellitus with hyperglycemia (HCC)- (present on admission)   Assessment & Plan    Patient's most recent A1c was 12.3 implying very poor control.  He is maintained on glipizide and metformin as an outpatient.  I DCed oral DM meds for frequent n.p.o. status during the hospital stay and needs to control sugar better.  Cover with sliding scale  Patient blood sugar still not controlled, I increased Lantus to 22 units, titrate upwards as we have a better idea of how his sugars run in house  Diabetic education  I also ordered a hypoglycemic protocol to prevent hypoglycemic event    I also increase short acting insulin 3 units 3 times a day,   History of acute ischemic stroke (HCC)- (present on admission)   Assessment & Plan    Patient currently has no further symptoms  I continue aspirin and adding Plavix for reasons of his PAD.  I also continue patient with statin.          VTE prophylaxis: Heparin subcu.      Current Facility-Administered Medications:   •  insulin glargine (LANTUS) injection 22 Units, 22 Units, Subcutaneous, Q EVENING, Judy Henry M.D.  •  insulin regular (HUMULIN R) injection 3 Units, 3 Units, Subcutaneous, TID AC, Judy Henry M.D., 3 Units at 06/29/19 1229  •  insulin regular (HUMULIN R) injection 2-9 Units, 2-9 Units, Subcutaneous, TID AC, 2 Units at 06/29/19 1228 **AND** Accu-Chek ACHS, , , Q AC AND BEDTIME(S) **AND** NOTIFY MD and PharmD, , , Once **AND** glucose 4 g chewable tablet 16 g, 16 g, Oral, Q15 MIN PRN **AND** DEXTROSE 10% BOLUS 250 mL, 250 mL, Intravenous, Q15 MIN PRN, Judy Henry M.D.  •  aspirin EC (ECOTRIN) tablet 81 mg,  81 mg, Oral, DAILY, GIL Devine.O., 81 mg at 06/29/19 0604  •  atorvastatin (LIPITOR) tablet 40 mg, 40 mg, Oral, Q EVENING, GIL Devine.O., 40 mg at 06/28/19 1737  •  clopidogrel (PLAVIX) tablet 75 mg, 75 mg, Oral, DAILY, GIL Devine.O., 75 mg at 06/29/19 0604  •  senna-docusate (PERICOLACE or SENOKOT S) 8.6-50 MG per tablet 2 Tab, 2 Tab, Oral, BID **AND** polyethylene glycol/lytes (MIRALAX) PACKET 1 Packet, 1 Packet, Oral, QDAY PRN **AND** magnesium hydroxide (MILK OF MAGNESIA) suspension 30 mL, 30 mL, Oral, QDAY PRN **AND** bisacodyl (DULCOLAX) suppository 10 mg, 10 mg, Rectal, QDAY PRN, GIL Devine.O.  •  Respiratory Care per Protocol, , Nebulization, Continuous RT, Tommie Greenfield D.O.  •  acetaminophen (TYLENOL) tablet 650 mg, 650 mg, Oral, Q6HRS PRN, GIL Devine.O.  •  ondansetron (ZOFRAN) syringe/vial injection 4 mg, 4 mg, Intravenous, Q4HRS PRN, GIL Devine.O.  •  ondansetron (ZOFRAN ODT) dispertab 4 mg, 4 mg, Oral, Q4HRS PRN, Tommie Greenfield D.O.  •  promethazine (PHENERGAN) tablet 12.5-25 mg, 12.5-25 mg, Oral, Q4HRS PRN, GIL Devine.O.  •  promethazine (PHENERGAN) suppository 12.5-25 mg, 12.5-25 mg, Rectal, Q4HRS PRN, Tommie Greenfield D.O.  •  prochlorperazine (COMPAZINE) injection 5-10 mg, 5-10 mg, Intravenous, Q4HRS PRN, Tommie Greenfield D.O.  •  oxyCODONE immediate-release (ROXICODONE) tablet 5-10 mg, 5-10 mg, Oral, Q4HRS PRN, LUCÍA eDvineOMert, 5 mg at 06/28/19 2211  •  heparin injection 5,000 Units, 5,000 Units, Subcutaneous, Q8HRS, Tommie Greenfield D.O., 5,000 Units at 06/29/19 0604

## 2019-06-30 LAB
ANION GAP SERPL CALC-SCNC: 8 MMOL/L (ref 0–11.9)
BASOPHILS # BLD AUTO: 0.5 % (ref 0–1.8)
BASOPHILS # BLD: 0.03 K/UL (ref 0–0.12)
BUN SERPL-MCNC: 21 MG/DL (ref 8–22)
CALCIUM SERPL-MCNC: 9.1 MG/DL (ref 8.5–10.5)
CHLORIDE SERPL-SCNC: 102 MMOL/L (ref 96–112)
CO2 SERPL-SCNC: 27 MMOL/L (ref 20–33)
CREAT SERPL-MCNC: 0.75 MG/DL (ref 0.5–1.4)
EOSINOPHIL # BLD AUTO: 0.08 K/UL (ref 0–0.51)
EOSINOPHIL NFR BLD: 1.4 % (ref 0–6.9)
ERYTHROCYTE [DISTWIDTH] IN BLOOD BY AUTOMATED COUNT: 43.3 FL (ref 35.9–50)
GLUCOSE BLD-MCNC: 166 MG/DL (ref 65–99)
GLUCOSE BLD-MCNC: 246 MG/DL (ref 65–99)
GLUCOSE BLD-MCNC: 280 MG/DL (ref 65–99)
GLUCOSE SERPL-MCNC: 207 MG/DL (ref 65–99)
HCT VFR BLD AUTO: 36.6 % (ref 42–52)
HGB BLD-MCNC: 11.3 G/DL (ref 14–18)
IMM GRANULOCYTES # BLD AUTO: 0.03 K/UL (ref 0–0.11)
IMM GRANULOCYTES NFR BLD AUTO: 0.5 % (ref 0–0.9)
LYMPHOCYTES # BLD AUTO: 2.11 K/UL (ref 1–4.8)
LYMPHOCYTES NFR BLD: 36.3 % (ref 22–41)
MCH RBC QN AUTO: 28.5 PG (ref 27–33)
MCHC RBC AUTO-ENTMCNC: 30.9 G/DL (ref 33.7–35.3)
MCV RBC AUTO: 92.4 FL (ref 81.4–97.8)
MONOCYTES # BLD AUTO: 0.5 K/UL (ref 0–0.85)
MONOCYTES NFR BLD AUTO: 8.6 % (ref 0–13.4)
NEUTROPHILS # BLD AUTO: 3.07 K/UL (ref 1.82–7.42)
NEUTROPHILS NFR BLD: 52.7 % (ref 44–72)
NRBC # BLD AUTO: 0 K/UL
NRBC BLD-RTO: 0 /100 WBC
PLATELET # BLD AUTO: 292 K/UL (ref 164–446)
PMV BLD AUTO: 11.1 FL (ref 9–12.9)
POTASSIUM SERPL-SCNC: 4.3 MMOL/L (ref 3.6–5.5)
RBC # BLD AUTO: 3.96 M/UL (ref 4.7–6.1)
SODIUM SERPL-SCNC: 137 MMOL/L (ref 135–145)
WBC # BLD AUTO: 5.8 K/UL (ref 4.8–10.8)

## 2019-06-30 PROCEDURE — A9270 NON-COVERED ITEM OR SERVICE: HCPCS | Performed by: HOSPITALIST

## 2019-06-30 PROCEDURE — 770006 HCHG ROOM/CARE - MED/SURG/GYN SEMI*

## 2019-06-30 PROCEDURE — 80048 BASIC METABOLIC PNL TOTAL CA: CPT

## 2019-06-30 PROCEDURE — 82962 GLUCOSE BLOOD TEST: CPT

## 2019-06-30 PROCEDURE — 700111 HCHG RX REV CODE 636 W/ 250 OVERRIDE (IP): Performed by: HOSPITALIST

## 2019-06-30 PROCEDURE — 700102 HCHG RX REV CODE 250 W/ 637 OVERRIDE(OP): Performed by: HOSPITALIST

## 2019-06-30 PROCEDURE — 85025 COMPLETE CBC W/AUTO DIFF WBC: CPT

## 2019-06-30 PROCEDURE — 99232 SBSQ HOSP IP/OBS MODERATE 35: CPT | Performed by: INTERNAL MEDICINE

## 2019-06-30 PROCEDURE — 700102 HCHG RX REV CODE 250 W/ 637 OVERRIDE(OP): Performed by: INTERNAL MEDICINE

## 2019-06-30 PROCEDURE — 36415 COLL VENOUS BLD VENIPUNCTURE: CPT

## 2019-06-30 RX ADMIN — HEPARIN SODIUM 5000 UNITS: 5000 INJECTION, SOLUTION INTRAVENOUS; SUBCUTANEOUS at 14:27

## 2019-06-30 RX ADMIN — ATORVASTATIN CALCIUM 40 MG: 40 TABLET, FILM COATED ORAL at 17:49

## 2019-06-30 RX ADMIN — CLOPIDOGREL BISULFATE 75 MG: 75 TABLET ORAL at 05:31

## 2019-06-30 RX ADMIN — INSULIN GLARGINE 22 UNITS: 100 INJECTION, SOLUTION SUBCUTANEOUS at 17:54

## 2019-06-30 RX ADMIN — HEPARIN SODIUM 5000 UNITS: 5000 INJECTION, SOLUTION INTRAVENOUS; SUBCUTANEOUS at 05:31

## 2019-06-30 RX ADMIN — ASPIRIN 81 MG: 81 TABLET, COATED ORAL at 05:31

## 2019-06-30 RX ADMIN — HEPARIN SODIUM 5000 UNITS: 5000 INJECTION, SOLUTION INTRAVENOUS; SUBCUTANEOUS at 22:13

## 2019-06-30 ASSESSMENT — ENCOUNTER SYMPTOMS
WHEEZING: 0
PND: 0
DIZZINESS: 0
DIAPHORESIS: 0
HEARTBURN: 0
DEPRESSION: 0
SPUTUM PRODUCTION: 0
EYE DISCHARGE: 0
ORTHOPNEA: 0
COUGH: 0
TINGLING: 0
BLURRED VISION: 0
CHILLS: 0
SPEECH CHANGE: 0
ABDOMINAL PAIN: 0
NECK PAIN: 0
VOMITING: 0
PHOTOPHOBIA: 0
FALLS: 0

## 2019-06-30 ASSESSMENT — LIFESTYLE VARIABLES: SUBSTANCE_ABUSE: 0

## 2019-06-30 NOTE — PROGRESS NOTES
[facility] Medicine Daily Progress Note    Date of Service  6/30/2019    Chief Complaint  53 y.o. male admitted 6/21/2019 with complains of left foot and heel ulcer    Hospital Course    Past medical history of PAD, diabetes uncontrolled, presented with complains of left foot and heel ulcer.  Patient was evaluated by vascular surgery and underwent angiogram and angioplasty.  Patient was put on antiplatelet therapy understanding.  Patient was also put on insulin for diabetes control.  Patient will have orthopedics and LPS evaluation.        Interval Problem Update  6/22.  Patient has been stable and comfortable overnight but still complains of left foot pain. Patient's pain is local, 6-8,/10, intermittent and does not radiate to other location, sharp and with some tingling. Can be controlled by pain meds. Dressing in place.  6/23. p patient still complains of lower extremity wound pain.  But pain can be controlled. Patient's pain is local 5-6/10, intermittent and does not radiate to other location, sharp and with some tingling. Can be controlled by pain meds. Dressing in place.  Still pending LPS recommendation.  6/24. Patient has been pleasant during the hospital stay and no significant overnight event.  Patient complains of lower extremity pain.  Patient was evaluated by orthopedics and will have surgery debridement this afternoon. Patient's pain is local 4-5/10, intermittent and does not radiate to other location, sharp and with some tingling. Can be controlled by pain meds. Dressing in place.  6/25.  Patient tolerated procedure yesterday very well.  Currently has wound VAC in place.  Patient still complains of left foot pain. Patient's pain is local, 6-8/10, intermittent and does not radiate to other location, sharp and with some tingling. Can be controlled by pain meds. Dressing in place.  6/26.  Patient relatively stable.  Patient blood sugar still not controlled.  Patient require wound care and wound VAC.  I put  on consult for outpatient wound clinic.  Pending further recommendation from orthopedics.  Patient complains of foot pain. Patient's pain is local, 6-7/10, intermittent and does not radiate to other location, sharp and with some tingling. Can be controlled by pain meds. Dressing in place and wound VAC in place  6/27.  Patient has been pleasant, wound VAC in place and working appropriately.   continue to work with outpatient clinic appointment and wound VAC to be delivered as outpatient.  Patient complains of local pain. Patient's pain is local, 4-6/10, intermittent and does not radiate to other location, sharp and with some tingling. Can be controlled by pain meds. Dressing in place.  6/28. Patient's wound VAC is examined by LPS today.  Recommend continue monitor and wound VAC will be changed tomorrow.  And recommend to continue monitor until next Tuesday.  Patient still complains of pain. Patient's pain is local 3-4/10, intermittent and does not radiate to other location, sharp and with some tingling. Can be controlled by pain meds. Dressing in place.  6/29.  Patient has been stable overnight and no significant overnight event.  Patient wound VAC will be replaced today.  Patient complains of lower extremity pain. Patient otherwise denies fever, chills, nausea, vomiting, adb pain, SOB, CP, headache, constipation, diarrhea, cough, or sputum.  6/30. Patient has been stable and no significant overnight event.  No significant abdominal pain lower extremity pain.  Blood sugar better controlled. Patient's pain is local 2-3/10, intermittent and does not radiate to other location, sharp and with some tingling. Can be controlled by pain meds.       Consultants/Specialty  Vascular surgery, LPS, orthopedics    Code Status  Full code    Disposition  Home with wound vac and wound clinic    Review of Systems  Review of Systems   Constitutional: Negative for chills, diaphoresis, malaise/fatigue and weight loss.    HENT: Negative for congestion, ear pain and hearing loss.    Eyes: Negative for blurred vision, double vision, photophobia, pain and discharge.   Respiratory: Negative for cough, sputum production, shortness of breath and wheezing.    Cardiovascular: Negative for chest pain, orthopnea, leg swelling and PND.   Gastrointestinal: Negative for abdominal pain, blood in stool, constipation, diarrhea, heartburn, nausea and vomiting.   Genitourinary: Negative for dysuria, frequency, hematuria and urgency.   Musculoskeletal: Positive for joint pain. Negative for back pain, falls and neck pain.   Skin: Negative for rash.        Left foot unhealed ulcer   Neurological: Negative for dizziness, tingling, tremors, speech change, focal weakness and headaches.   Psychiatric/Behavioral: Negative for depression, hallucinations and substance abuse.        Physical Exam  Temp:  [36.1 °C (97 °F)-36.4 °C (97.6 °F)] 36.3 °C (97.3 °F)  Pulse:  [82-93] 82  Resp:  [15-16] 15  BP: (105-138)/(73-86) 131/81  SpO2:  [94 %-97 %] 96 %    Physical Exam   Constitutional: He is oriented to person, place, and time. No distress.   HENT:   Head: Normocephalic.   Right Ear: External ear normal.   Left Ear: External ear normal.   Eyes: Pupils are equal, round, and reactive to light. Conjunctivae and EOM are normal. Right eye exhibits no discharge. Left eye exhibits no discharge.   Neck: Normal range of motion. Neck supple. No tracheal deviation present. No thyromegaly present.   Cardiovascular: Normal rate and regular rhythm.  Exam reveals no gallop.    No murmur heard.  Pulmonary/Chest: Effort normal and breath sounds normal. No stridor. No respiratory distress. He exhibits no tenderness.   Abdominal: Soft. Bowel sounds are normal. He exhibits no distension and no mass. There is no rebound.   Musculoskeletal: Normal range of motion. He exhibits no tenderness.   Neurological: He is alert and oriented to person, place, and time. He displays normal  reflexes. No cranial nerve deficit.   Skin: Skin is dry. No rash noted. He is not diaphoretic. No erythema.   Left foot and heel ulcer with surrounding erythema  Wound VAC in place   Psychiatric: He has a normal mood and affect.   Nursing note and vitals reviewed.      Fluids    Intake/Output Summary (Last 24 hours) at 06/30/19 1530  Last data filed at 06/30/19 0534   Gross per 24 hour   Intake              660 ml   Output              900 ml   Net             -240 ml       Laboratory  Recent Labs      06/28/19 0719 06/29/19 0220 06/30/19   0313   WBC  6.1  5.9  5.8   RBC  4.09*  3.85*  3.96*   HEMOGLOBIN  12.0*  11.6*  11.3*   HEMATOCRIT  37.5*  36.0*  36.6*   MCV  91.7  93.5  92.4   MCH  29.3  30.1  28.5   MCHC  32.0*  32.2*  30.9*   RDW  43.0  43.7  43.3   PLATELETCT  286  280  292   MPV  11.0  11.2  11.1     Recent Labs      06/28/19 0719 06/29/19 0220 06/30/19 0313   SODIUM  137  135  137   POTASSIUM  4.3  4.3  4.3   CHLORIDE  102  101  102   CO2  26  24  27   GLUCOSE  159*  248*  207*   BUN  13  20  21   CREATININE  0.84  0.76  0.75   CALCIUM  9.4  9.0  9.1                   Imaging  IR-EXTREMITY ANGIOGRAM-UNILATERAL LEFT    (Results Pending)        Assessment/Plan  * Peripheral arterial disease (HCC)   Assessment & Plan    Patient has had an angiogram and angioplasty done tonight to the left lower extremity.  His pulses are not palpable on my exam however his foot is warm, his pain is much less, and they are the pulses are dopplerable.  Patient was evaluated by vascular surgery.    Currently has good perfusion however patient has unhealed ulcer disease.    Patient continue wound care   Appreciate LPS and orthopedics recommendation plan for surgery 6/24  Patient is a lifelong non-smoker  Discontinue antibiotics for now.  Continue wound care and wound VAC.  Patient will need wound VAC and wound clinic as outpatient.  Currently still pending wound VAC and wound clinic to be set up,  to  help    Patient's wound VAC is examined by LPS 6/28.  Recommend continue monitor, and wound VAC will be changed 6/29.  And recommend to continue monitor until next Tuesday.  Wound looks good no need for antibiotics     Wound infection- (present on admission)   Assessment & Plan    Vascular surgery Dr. Coates is following.  Consult limb preservation service  Foot gangrene, status post debridement, I discontinue patient's antibiotics given patient has no signs of sepsis or local signs of infection anymore.  Wound care to be continued, appreciate input.    wound debridement 6/24  Wound VAC currently in place continue wound care  Infection resolved currently off antibiotics.  Wound looks good no need for antibiotics     Hyperlipidemia- (present on admission)   Assessment & Plan    Continue statin     Uncontrolled type 2 diabetes mellitus with hyperglycemia (HCC)- (present on admission)   Assessment & Plan    Patient's most recent A1c was 12.3 implying very poor control.  He is maintained on glipizide and metformin as an outpatient.  I DCed oral DM meds for frequent n.p.o. status during the hospital stay and needs to control sugar better.  Cover with sliding scale  Patient blood sugar still not controlled, I increased Lantus to 22 units, stable and better controlled  Diabetic education  I also ordered a hypoglycemic protocol to prevent hypoglycemic event    I also increase short acting insulin 3 units 3 times a day,     Acute ischemic stroke (HCC)- (present on admission)   Assessment & Plan    Patient currently has no further symptoms  I continue aspirin and adding Plavix for reasons of his PAD.  I also continue patient with statin.             VTE prophylaxis: Heparin subcu.      Current Facility-Administered Medications:   •  insulin glargine (LANTUS) injection 22 Units, 22 Units, Subcutaneous, Q EVENING, Judy Henry M.D., 22 Units at 06/29/19 1821  •  insulin regular (HUMULIN R) injection 3 Units, 3 Units, Subcutaneous,  TID AC, Judy Henry M.D., 3 Units at 06/30/19 1323  •  insulin regular (HUMULIN R) injection 2-9 Units, 2-9 Units, Subcutaneous, TID AC, 3 Units at 06/30/19 1321 **AND** Accu-Chek ACHS, , , Q AC AND BEDTIME(S) **AND** NOTIFY MD and PharmD, , , Once **AND** glucose 4 g chewable tablet 16 g, 16 g, Oral, Q15 MIN PRN **AND** DEXTROSE 10% BOLUS 250 mL, 250 mL, Intravenous, Q15 MIN PRN, Judy Henry M.D.  •  aspirin EC (ECOTRIN) tablet 81 mg, 81 mg, Oral, DAILY, Tommie Greenfield D.O., 81 mg at 06/30/19 0531  •  atorvastatin (LIPITOR) tablet 40 mg, 40 mg, Oral, Q EVENING, Tommie Greenfield D.O., 40 mg at 06/29/19 1814  •  clopidogrel (PLAVIX) tablet 75 mg, 75 mg, Oral, DAILY, Tommie Greenfield, D.O., 75 mg at 06/30/19 0531  •  senna-docusate (PERICOLACE or SENOKOT S) 8.6-50 MG per tablet 2 Tab, 2 Tab, Oral, BID **AND** polyethylene glycol/lytes (MIRALAX) PACKET 1 Packet, 1 Packet, Oral, QDAY PRN **AND** magnesium hydroxide (MILK OF MAGNESIA) suspension 30 mL, 30 mL, Oral, QDAY PRN **AND** bisacodyl (DULCOLAX) suppository 10 mg, 10 mg, Rectal, QDAY PRN, Tommie Greenfield, D.O.  •  Respiratory Care per Protocol, , Nebulization, Continuous RT, Tommie Greenfield D.O.  •  acetaminophen (TYLENOL) tablet 650 mg, 650 mg, Oral, Q6HRS PRN, Tommie Greenfield D.O.  •  ondansetron (ZOFRAN) syringe/vial injection 4 mg, 4 mg, Intravenous, Q4HRS PRN, Tommie Greenfield D.O.  •  ondansetron (ZOFRAN ODT) dispertab 4 mg, 4 mg, Oral, Q4HRS PRN, LUCÍA DevineO.  •  promethazine (PHENERGAN) tablet 12.5-25 mg, 12.5-25 mg, Oral, Q4HRS PRN, GIL Devine.O.  •  promethazine (PHENERGAN) suppository 12.5-25 mg, 12.5-25 mg, Rectal, Q4HRS PRN, GIL Devine.O.  •  prochlorperazine (COMPAZINE) injection 5-10 mg, 5-10 mg, Intravenous, Q4HRS PRN, GIL Devine.O.  •  oxyCODONE immediate-release (ROXICODONE) tablet 5-10 mg, 5-10 mg, Oral, Q4HRS PRN, Tommie Greenfield,  D.O., 5 mg at 06/29/19 2204  •  heparin injection 5,000 Units, 5,000 Units, Subcutaneous, Q8HRS, Tommie Greenfield D.O., 5,000 Units at 06/30/19 1422

## 2019-06-30 NOTE — CARE PLAN
Problem: Safety  Goal: Will remain free from injury  Educated on safety with ambulation, pt agreeable to call before getting up by himself.   Call light within reach, hourly rounding in place.     Problem: Infection  Goal: Will remain free from infection  Educated on hand hygiene  Wound vac intact, no signs of infection.     Problem: Pain Management  Goal: Pain level will decrease to patient's comfort goal  Resting comfortably. Declines medication.

## 2019-06-30 NOTE — PROGRESS NOTES
Bedside report completed, assumed pt care.  Pt resting in bed, A&Ox4.  Assessment complete.   Pt has wound vac to L foot, scant serosanguinous output, no leak present, vac CDI. LLE warm, popliteal pulse 1+  R pedal pulse 1+, warm extremity.   Lung sounds clear upon auscultation   not in use  Pt tolerating  diet  Normo active bowel sounds x 4 quadrants upon auscultation.   Last BM today, x 2, during day, normal and formed per pt  + flatus  Pt educated on diet, POC, medications, vac, mobility  No complaints of pain. Resting comfortably  Snack given  Pt denies numbness, tingling, chest pain, SOB and nausea.   Discussed plan of care with pt.   All questions answered.   Call light within reach, bed in low position, possessions within reach, treaded socks on, floor free from trip hazard, Hourly rounding in place.   SCDs not in use, refused despite education

## 2019-06-30 NOTE — PROGRESS NOTES
Assumed care of patient from night shift RN.  Patient is alert and oriented times 4, denies pain at this time.  VSS /81   Pulse 82   Temp 36.3 °C (97.3 °F) (Temporal)   Resp 15   Ht 1.829 m (6')   Wt 76.2 kg (167 lb 15.9 oz)   SpO2 96%   BMI 22.78 kg/m²   PIV in the RAC, patent and saline locked.  On RA with saturations in the mid 90s.  Last BM this AM, urinating without difficulty.  Regular vegetarian diet, tolerating well.  Wound vac to the L foot, no leaks noted.  Small healing puncture site to the R groin,CDI.  Patient is up self with a FWW, demonstrates steady gait, no assistance needed.  POC discussed for the day, bed is locked and in the lowest position, call light is within reach.  All needs are met at this time, hourly rounding is in place.

## 2019-07-01 LAB
GLUCOSE BLD-MCNC: 151 MG/DL (ref 65–99)
GLUCOSE BLD-MCNC: 189 MG/DL (ref 65–99)
GLUCOSE BLD-MCNC: 239 MG/DL (ref 65–99)

## 2019-07-01 PROCEDURE — A9270 NON-COVERED ITEM OR SERVICE: HCPCS | Performed by: HOSPITALIST

## 2019-07-01 PROCEDURE — 700102 HCHG RX REV CODE 250 W/ 637 OVERRIDE(OP): Performed by: HOSPITALIST

## 2019-07-01 PROCEDURE — 99232 SBSQ HOSP IP/OBS MODERATE 35: CPT | Performed by: INTERNAL MEDICINE

## 2019-07-01 PROCEDURE — 700111 HCHG RX REV CODE 636 W/ 250 OVERRIDE (IP): Performed by: HOSPITALIST

## 2019-07-01 PROCEDURE — 82962 GLUCOSE BLOOD TEST: CPT | Mod: 91

## 2019-07-01 PROCEDURE — 700102 HCHG RX REV CODE 250 W/ 637 OVERRIDE(OP): Performed by: INTERNAL MEDICINE

## 2019-07-01 PROCEDURE — 770006 HCHG ROOM/CARE - MED/SURG/GYN SEMI*

## 2019-07-01 RX ADMIN — HEPARIN SODIUM 5000 UNITS: 5000 INJECTION, SOLUTION INTRAVENOUS; SUBCUTANEOUS at 14:31

## 2019-07-01 RX ADMIN — HEPARIN SODIUM 5000 UNITS: 5000 INJECTION, SOLUTION INTRAVENOUS; SUBCUTANEOUS at 06:36

## 2019-07-01 RX ADMIN — SENNOSIDES, DOCUSATE SODIUM 2 TABLET: 50; 8.6 TABLET, FILM COATED ORAL at 17:10

## 2019-07-01 RX ADMIN — CLOPIDOGREL BISULFATE 75 MG: 75 TABLET ORAL at 06:36

## 2019-07-01 RX ADMIN — ASPIRIN 81 MG: 81 TABLET, COATED ORAL at 06:36

## 2019-07-01 RX ADMIN — SENNOSIDES, DOCUSATE SODIUM 2 TABLET: 50; 8.6 TABLET, FILM COATED ORAL at 06:36

## 2019-07-01 RX ADMIN — HEPARIN SODIUM 5000 UNITS: 5000 INJECTION, SOLUTION INTRAVENOUS; SUBCUTANEOUS at 22:14

## 2019-07-01 RX ADMIN — INSULIN GLARGINE 22 UNITS: 100 INJECTION, SOLUTION SUBCUTANEOUS at 17:04

## 2019-07-01 RX ADMIN — ATORVASTATIN CALCIUM 40 MG: 40 TABLET, FILM COATED ORAL at 17:10

## 2019-07-01 ASSESSMENT — ENCOUNTER SYMPTOMS
NAUSEA: 0
SHORTNESS OF BREATH: 0
DOUBLE VISION: 0
CHILLS: 0
DIAPHORESIS: 0
FALLS: 0
ORTHOPNEA: 0
BLOOD IN STOOL: 0
BLURRED VISION: 0
EYE PAIN: 0
DEPRESSION: 0
HEARTBURN: 0
DIARRHEA: 0
WHEEZING: 0
NECK PAIN: 0
COUGH: 0
BACK PAIN: 0
HEADACHES: 0
PND: 0
SPEECH CHANGE: 0
DIZZINESS: 0
EYE DISCHARGE: 0
VOMITING: 0
HALLUCINATIONS: 0
FOCAL WEAKNESS: 0
SPUTUM PRODUCTION: 0
WEIGHT LOSS: 0
CONSTIPATION: 0
TREMORS: 0

## 2019-07-01 ASSESSMENT — LIFESTYLE VARIABLES: SUBSTANCE_ABUSE: 0

## 2019-07-01 NOTE — PROGRESS NOTES
[facility] Medicine Daily Progress Note    Date of Service  7/1/2019    Chief Complaint  53 y.o. male admitted 6/21/2019 with complains of left foot and heel ulcer    Hospital Course    Past medical history of PAD, diabetes uncontrolled, presented with complains of left foot and heel ulcer.  Patient was evaluated by vascular surgery and underwent angiogram and angioplasty.  Patient was put on antiplatelet therapy understanding.  Patient was also put on insulin for diabetes control.  Patient will have orthopedics and LPS evaluation.        Interval Problem Update  6/22.  Patient has been stable and comfortable overnight but still complains of left foot pain. Patient's pain is local, 6-8,/10, intermittent and does not radiate to other location, sharp and with some tingling. Can be controlled by pain meds. Dressing in place.  6/23. p patient still complains of lower extremity wound pain.  But pain can be controlled. Patient's pain is local 5-6/10, intermittent and does not radiate to other location, sharp and with some tingling. Can be controlled by pain meds. Dressing in place.  Still pending LPS recommendation.  6/24. Patient has been pleasant during the hospital stay and no significant overnight event.  Patient complains of lower extremity pain.  Patient was evaluated by orthopedics and will have surgery debridement this afternoon. Patient's pain is local 4-5/10, intermittent and does not radiate to other location, sharp and with some tingling. Can be controlled by pain meds. Dressing in place.  6/25.  Patient tolerated procedure yesterday very well.  Currently has wound VAC in place.  Patient still complains of left foot pain. Patient's pain is local, 6-8/10, intermittent and does not radiate to other location, sharp and with some tingling. Can be controlled by pain meds. Dressing in place.  6/26.  Patient relatively stable.  Patient blood sugar still not controlled.  Patient require wound care and wound VAC.  I put  on consult for outpatient wound clinic.  Pending further recommendation from orthopedics.  Patient complains of foot pain. Patient's pain is local, 6-7/10, intermittent and does not radiate to other location, sharp and with some tingling. Can be controlled by pain meds. Dressing in place and wound VAC in place  6/27.  Patient has been pleasant, wound VAC in place and working appropriately.   continue to work with outpatient clinic appointment and wound VAC to be delivered as outpatient.  Patient complains of local pain. Patient's pain is local, 4-6/10, intermittent and does not radiate to other location, sharp and with some tingling. Can be controlled by pain meds. Dressing in place.  6/28. Patient's wound VAC is examined by LPS today.  Recommend continue monitor and wound VAC will be changed tomorrow.  And recommend to continue monitor until next Tuesday.  Patient still complains of pain. Patient's pain is local 3-4/10, intermittent and does not radiate to other location, sharp and with some tingling. Can be controlled by pain meds. Dressing in place.  6/29.  Patient has been stable overnight and no significant overnight event.  Patient wound VAC will be replaced today.  Patient complains of lower extremity pain. Patient otherwise denies fever, chills, nausea, vomiting, adb pain, SOB, CP, headache, constipation, diarrhea, cough, or sputum.  6/30. Patient has been stable and no significant overnight event.  No significant abdominal pain lower extremity pain.  Blood sugar better controlled. Patient's pain is local 2-3/10, intermittent and does not radiate to other location, sharp and with some tingling. Can be controlled by pain meds.   7/1.  Patient blood sugar better controlled.  Patient lower extremity wound is healing with wound VAC. Patient otherwise denies fever, chills, nausea, vomiting, adb pain, SOB, CP, headache, constipation, diarrhea, cough, or sputum.      Consultants/Specialty  Vascular  surgery, LPS, orthopedics    Code Status  Full code    Disposition  Home with wound vac and wound clinic    Review of Systems  Review of Systems   Constitutional: Negative for chills, diaphoresis, malaise/fatigue and weight loss.   HENT: Negative for congestion, ear pain and hearing loss.    Eyes: Negative for blurred vision, double vision and discharge.   Respiratory: Negative for cough, sputum production, shortness of breath and wheezing.    Cardiovascular: Negative for chest pain, orthopnea, leg swelling and PND.   Gastrointestinal: Negative for blood in stool, heartburn, nausea and vomiting.   Genitourinary: Negative for dysuria, frequency, hematuria and urgency.   Musculoskeletal: Positive for joint pain. Negative for back pain, falls and neck pain.   Skin: Negative for rash.        Left foot unhealed ulcer   Neurological: Negative for dizziness, speech change and focal weakness.   Psychiatric/Behavioral: Negative for depression and substance abuse.        Physical Exam  Temp:  [36.1 °C (97 °F)-36.5 °C (97.7 °F)] 36.1 °C (97 °F)  Pulse:  [85-91] 90  Resp:  [15-17] 15  BP: (121-144)/(79-86) 121/82  SpO2:  [97 %-100 %] 100 %    Physical Exam   Constitutional: He is oriented to person, place, and time. He appears well-developed.   HENT:   Head: Normocephalic and atraumatic.   Eyes: Conjunctivae are normal. Right eye exhibits no discharge. Left eye exhibits no discharge.   Neck: Normal range of motion. Neck supple. No tracheal deviation present. No thyromegaly present.   Cardiovascular: Normal rate and regular rhythm.  Exam reveals no gallop.    No murmur heard.  Pulmonary/Chest: Effort normal and breath sounds normal. No stridor. No respiratory distress. He exhibits no tenderness.   Abdominal: Soft. Bowel sounds are normal. He exhibits no distension and no mass. There is no rebound.   Musculoskeletal: Normal range of motion. He exhibits no tenderness.   Neurological: He is alert and oriented to person, place, and  time. He displays normal reflexes. No cranial nerve deficit.   Skin: Skin is dry. No rash noted. He is not diaphoretic. No erythema.   Left foot and heel ulcer with surrounding erythema  Wound VAC in place   Psychiatric: He has a normal mood and affect.   Nursing note and vitals reviewed.      Fluids    Intake/Output Summary (Last 24 hours) at 07/01/19 1416  Last data filed at 07/01/19 0740   Gross per 24 hour   Intake             1080 ml   Output                0 ml   Net             1080 ml       Laboratory  Recent Labs      06/29/19 0220 06/30/19   0313   WBC  5.9  5.8   RBC  3.85*  3.96*   HEMOGLOBIN  11.6*  11.3*   HEMATOCRIT  36.0*  36.6*   MCV  93.5  92.4   MCH  30.1  28.5   MCHC  32.2*  30.9*   RDW  43.7  43.3   PLATELETCT  280  292   MPV  11.2  11.1     Recent Labs      06/29/19 0220 06/30/19 0313   SODIUM  135  137   POTASSIUM  4.3  4.3   CHLORIDE  101  102   CO2  24  27   GLUCOSE  248*  207*   BUN  20  21   CREATININE  0.76  0.75   CALCIUM  9.0  9.1                   Imaging  IR-EXTREMITY ANGIOGRAM-UNILATERAL LEFT    (Results Pending)        Assessment/Plan  * Peripheral arterial disease (HCC)   Assessment & Plan    Patient has had an angiogram and angioplasty done tonight to the left lower extremity.  His pulses are not palpable on my exam however his foot is warm, his pain is much less, and they are the pulses are dopplerable.  Patient was evaluated by vascular surgery.    Currently has good perfusion however patient has unhealed ulcer disease.    Patient continue wound care   Appreciate LPS and orthopedics recommendation plan for surgery 6/24  Patient is a lifelong non-smoker  Discontinue antibiotics for now.  Continue wound care and wound VAC.  Patient will need wound VAC and wound clinic as outpatient.  Currently still pending wound VAC and wound clinic to be set up,  to help    Patient's wound VAC is examined by LPS 6/28.  Recommend continue monitor, and wound VAC will be  changed 6/29.  And recommend to continue monitor until next Tuesday.  Wound looks good no need for antibiotics  Possible change wound vac again tmr and if still good, may go home soon with outpatient wound follow up.     Wound infection- (present on admission)   Assessment & Plan    Vascular surgery Dr. Coates is following.  Consult limb preservation service  Foot gangrene, status post debridement, I discontinue patient's antibiotics given patient has no signs of sepsis or local signs of infection anymore.  Wound care to be continued, appreciate input.    wound debridement 6/24  Wound VAC currently in place continue wound care  Infection resolved currently off antibiotics.  Wound looks good no need for antibiotics     Hyperlipidemia- (present on admission)   Assessment & Plan    Continue statin     Uncontrolled type 2 diabetes mellitus with hyperglycemia (HCC)- (present on admission)   Assessment & Plan    Patient's most recent A1c was 12.3 implying very poor control.  He is maintained on glipizide and metformin as an outpatient.  I DCed oral DM meds for frequent n.p.o. status during the hospital stay and needs to control sugar better.  Cover with sliding scale  Patient blood sugar still not controlled, I increased Lantus to 22 units, stable and better controlled  Diabetic education  I also ordered a hypoglycemic protocol to prevent hypoglycemic event    I also increase short acting insulin 3 units 3 times a day,     Acute ischemic stroke (HCC)- (present on admission)   Assessment & Plan    Patient currently has no further symptoms  I continue aspirin and adding Plavix for reasons of his PAD.  I also continue patient with statin.  monitor           VTE prophylaxis: Heparin subcu.      Current Facility-Administered Medications:   •  insulin glargine (LANTUS) injection 22 Units, 22 Units, Subcutaneous, Q EVENING, Judy Henry M.D., 22 Units at 06/30/19 8214  •  insulin regular (HUMULIN R) injection 3 Units, 3 Units,  Subcutaneous, TID AC, Judy Henry M.D., 3 Units at 07/01/19 0639  •  insulin regular (HUMULIN R) injection 2-9 Units, 2-9 Units, Subcutaneous, TID AC, 2 Units at 07/01/19 1200 **AND** Accu-Chek ACHS, , , Q AC AND BEDTIME(S) **AND** NOTIFY MD and PharmD, , , Once **AND** glucose 4 g chewable tablet 16 g, 16 g, Oral, Q15 MIN PRN **AND** DEXTROSE 10% BOLUS 250 mL, 250 mL, Intravenous, Q15 MIN PRN, Judy Henry M.D.  •  aspirin EC (ECOTRIN) tablet 81 mg, 81 mg, Oral, DAILY, Tommie Greenfield D.O., 81 mg at 07/01/19 0636  •  atorvastatin (LIPITOR) tablet 40 mg, 40 mg, Oral, Q EVENING, Tommie Greenfield D.O., 40 mg at 06/30/19 1749  •  clopidogrel (PLAVIX) tablet 75 mg, 75 mg, Oral, DAILY, Tommie Greenfield D.O., 75 mg at 07/01/19 0636  •  senna-docusate (PERICOLACE or SENOKOT S) 8.6-50 MG per tablet 2 Tab, 2 Tab, Oral, BID, 2 Tab at 07/01/19 0636 **AND** polyethylene glycol/lytes (MIRALAX) PACKET 1 Packet, 1 Packet, Oral, QDAY PRN **AND** magnesium hydroxide (MILK OF MAGNESIA) suspension 30 mL, 30 mL, Oral, QDAY PRN **AND** bisacodyl (DULCOLAX) suppository 10 mg, 10 mg, Rectal, QDAY PRN, Tommie Greenfield D.O.  •  Respiratory Care per Protocol, , Nebulization, Continuous RT, Tommie Greenfield D.O.  •  acetaminophen (TYLENOL) tablet 650 mg, 650 mg, Oral, Q6HRS PRN, Tommie Greenfield D.O.  •  ondansetron (ZOFRAN) syringe/vial injection 4 mg, 4 mg, Intravenous, Q4HRS PRN, Tommie Greenfield D.O.  •  ondansetron (ZOFRAN ODT) dispertab 4 mg, 4 mg, Oral, Q4HRS PRN, LUCÍA DevineO.  •  promethazine (PHENERGAN) tablet 12.5-25 mg, 12.5-25 mg, Oral, Q4HRS PRN, LUCÍA DevineO.  •  promethazine (PHENERGAN) suppository 12.5-25 mg, 12.5-25 mg, Rectal, Q4HRS PRN, LUCÍA DevineO.  •  prochlorperazine (COMPAZINE) injection 5-10 mg, 5-10 mg, Intravenous, Q4HRS PRN, GIL Devine.O.  •  oxyCODONE immediate-release (ROXICODONE) tablet 5-10 mg, 5-10 mg,  Oral, Q4HRS PRN, Tommie Greenfield D.O., 5 mg at 06/29/19 0498  •  heparin injection 5,000 Units, 5,000 Units, Subcutaneous, Q8HRS, Tommie Greenfield D.O., 5,000 Units at 07/01/19 2032

## 2019-07-01 NOTE — DISCHARGE PLANNING
Anticipated Discharge Disposition: Home with [place], WC and FWW    Action: This RN CM ordered pt FWW through ortho traction per MD.  In addition, pt's home wound vac is at bedside.  Pt is scheduled for outpatient wound care on 7/5/19 at 1400.    Barriers to Discharge: none    Plan: No dc needs identified at this time.

## 2019-07-01 NOTE — CARE PLAN
Problem: Safety  Goal: Will remain free from injury  Educated on safety, and to call before getting up, pt agreeable  Hourly rounding in place  All possessions within reach.     Problem: Infection  Goal: Will remain free from infection  Wound vac intact, no signs of infection  Educated on oral care and hand hygiene    Problem: Pain Management  Goal: Pain level will decrease to patient's comfort goal  No pain  Resting comfortably.

## 2019-07-01 NOTE — PROGRESS NOTES
FW walker was delivered and fitted to patient.  If any further assistance needed, please call extension [phone] or place order for Ortho Technician assistance as a communication order in [place].

## 2019-07-01 NOTE — CARE PLAN
Problem: Safety  Goal: Will remain free from injury  Outcome: PROGRESSING AS EXPECTED  No IV fluids infusing, non slip  socks on patients feet, walker in bathroom, off loading boot to be worn during ambulation, bed in lowest position, call light within reach, floor free of clutter and spills.    Problem: Mobility  Goal: Risk for activity intolerance will decrease  Outcome: PROGRESSING AS EXPECTED  Up to chair for meals, ambulate in hallway x3 today at a minimum of 50ft, reposition while in bed every 2 hours.

## 2019-07-01 NOTE — PROGRESS NOTES
Assumed care of patient at 0700. Patient is alert and oriented, respirations are unlabored and regular, patient lying in bed resting at this time. Lung sounds clear throughout, +bowel sounds, +gas, BM 6/30, voiding without difficulty. Wound vac to left foot, intact and sealed appropriately, ace wrap also on left foot. Patient denies numbness or tingling, Left second toe with scab to top of toe. Bed in lowest position, call light within reach, patient states no needs at this time.

## 2019-07-01 NOTE — PROGRESS NOTES
Bedside report completed, assumed pt care.  Pt resting in bed, A&Ox4.  Assessment complete.   Wound vac to left foot, CDI, no drainage, intact, no leak present.    Pt is up with FWW and offloading shoe, steady  L popliteal pulse 1+, R pedal pulse 1+, R popliteal pulse 1+  BLE warm  Lung sounds clear upon auscultation   not in use  Pt tolerating  diet  Normo active bowel sounds x 4 quadrants upon auscultation.   Last BM today  + flatus  Pt educated on diet, wound care, medications, POC  No complaints of pain. Resting comfortably.   Pt denies numbness, tingling, chest pain, SOB and nausea.   Discussed plan of care with pt.   All questions answered.   Call light within reach, bed in low position, possessions within reach, treaded socks on, floor free from trip hazard, Hourly rounding in place.   SCDs not is use, pt refused despite education.

## 2019-07-02 ENCOUNTER — APPOINTMENT (OUTPATIENT)
Dept: WOUND CARE | Facility: MEDICAL CENTER | Age: 54
End: 2019-07-02
Attending: PHYSICIAN ASSISTANT
Payer: COMMERCIAL

## 2019-07-02 VITALS
WEIGHT: 167.99 LBS | BODY MASS INDEX: 22.75 KG/M2 | HEIGHT: 72 IN | TEMPERATURE: 96.9 F | HEART RATE: 88 BPM | RESPIRATION RATE: 16 BRPM | OXYGEN SATURATION: 92 % | DIASTOLIC BLOOD PRESSURE: 85 MMHG | SYSTOLIC BLOOD PRESSURE: 130 MMHG

## 2019-07-02 LAB
GLUCOSE BLD-MCNC: 195 MG/DL (ref 65–99)
GLUCOSE BLD-MCNC: 219 MG/DL (ref 65–99)

## 2019-07-02 PROCEDURE — 97605 NEG PRS WND THER DME<=50SQCM: CPT

## 2019-07-02 PROCEDURE — A9270 NON-COVERED ITEM OR SERVICE: HCPCS | Performed by: HOSPITALIST

## 2019-07-02 PROCEDURE — 82962 GLUCOSE BLOOD TEST: CPT | Mod: 91

## 2019-07-02 PROCEDURE — 700111 HCHG RX REV CODE 636 W/ 250 OVERRIDE (IP): Performed by: HOSPITALIST

## 2019-07-02 PROCEDURE — 700102 HCHG RX REV CODE 250 W/ 637 OVERRIDE(OP): Performed by: HOSPITALIST

## 2019-07-02 PROCEDURE — 99239 HOSP IP/OBS DSCHRG MGMT >30: CPT | Performed by: INTERNAL MEDICINE

## 2019-07-02 RX ADMIN — CLOPIDOGREL BISULFATE 75 MG: 75 TABLET ORAL at 06:16

## 2019-07-02 RX ADMIN — HEPARIN SODIUM 5000 UNITS: 5000 INJECTION, SOLUTION INTRAVENOUS; SUBCUTANEOUS at 06:12

## 2019-07-02 RX ADMIN — SENNOSIDES, DOCUSATE SODIUM 2 TABLET: 50; 8.6 TABLET, FILM COATED ORAL at 06:12

## 2019-07-02 RX ADMIN — ASPIRIN 81 MG: 81 TABLET, COATED ORAL at 06:12

## 2019-07-02 NOTE — PROGRESS NOTES
Bedside report completed, assumed pt care.  Pt resting in bed, A&Ox4.  Assessment complete.   L foot wound vac in place, no leak, no drainage,  CDI  Scabbed 2nd and 3rd L toes, PADMAJA.   1+ politeal pulse BLE,  1+ R pedal pulse  BLE warm  Lung sounds clear upon auscultation   not in use  Pt tolerating  diet  Normo active bowel sounds x 4 quadrants upon auscultation.   Last BM today  + flatus  Pt educated on diet, POC, medications, wound vac  No complaints of pain. Resting comfortably  Family visiting currently.   Pt denies numbness, tingling, chest pain, SOB and nausea.   Discussed plan of care with pt.   All questions answered.   Call light within reach, bed in low position, possessions within reach, treaded socks on, floor free from trip hazard, Hourly rounding in place.   SCDs not in use, refused despite education. L foot vac in place.

## 2019-07-02 NOTE — DISCHARGE INSTRUCTIONS
Discharge Instructions    Discharged to home by car with relative. Discharged via wheelchair, hospital escort: Yes.  Special equipment needed: Not Applicable    Be sure to schedule a follow-up appointment with your primary care doctor or any specialists as instructed.     Discharge Plan:   Diet Plan: Discussed  Activity Level: Discussed  Confirmed Follow up Appointment: Patient to Call and Schedule Appointment  Confirmed Symptoms Management: Discussed  Medication Reconciliation Updated: Yes  Pneumococcal Vaccine Administered/Refused: Not given - Patient refused pneumococcal vaccine  Influenza Vaccine Indication: Patient Refuses    I understand that a diet low in cholesterol, fat, and sodium is recommended for good health. Unless I have been given specific instructions below for another diet, I accept this instruction as my diet prescription.   Other diet: Diabetic diet as tolerated.    Special Instructions: Follow up with [facility], diabetes educator [phone], and wound care clinic for wound vac.     For glucose, sliding scale:  70   - 150  mg/dL =      0 Units  151 - 200  mg/dL =    2 Units  201 - 250  mg/dL =    3 Units  251 - 300  mg/dL  =   5 Units  301 - 350  mg/dL  =   6 Units  351 - 400 mg/dL   =   8 Units  Over 400 mg/dL   =   9 Units  Over 400 mg/dL x 2 consecutive FSBG = 9 Units and call MD    · Is patient discharged on Warfarin / Coumadin?   No     Diets for Diabetes, Food Labeling  Look at food labels to help you decide how much of a product you can eat. You will want to check the amount of total carbohydrate in a serving to see how the food fits into your meal plan. In the list of ingredients, the ingredient present in the largest amount by weight must be listed first, followed by the other ingredients in descending order.  STANDARD OF IDENTITY  Most products have a list of ingredients. However, foods that the Food and Drug Administration (FDA) has given a standard of identity do not  "need a list of ingredients. A standard of identity means that a food must contain certain ingredients if it is called a particular name. Examples are mayonnaise, peanut butter, ketchup, jelly, and cheese.  LABELING TERMS  There are many terms found on food labels. Some of these terms have specific definitions. Some terms are regulated by the FDA, and the FDA has clearly specified how they can be used. Others are not regulated or well-defined and can be misleading and confusing.  SPECIFICALLY DEFINED TERMS  Nutritive Sweetener.  · A sweetener that contains calories,such as table sugar or honey.  Nonnutritive Sweetener.  · A sweetener with few or no calories,such as saccharin, aspartame, sucralose, and cyclamate.  LABELING TERMS REGULATED BY THE FDA  Free.  · The product contains only a tiny or small amount of fat, cholesterol, sodium, sugar, or calories. For example, a \"fat-free\" product will contain less than 0.5 g of fat per serving.  Low.  · A food described as \"low\" in fat, saturated fat, cholesterol, sodium, or calories could be eaten fairly often without exceeding dietary guidelines. For example, \"low in fat\" means no more than 3 g of fat per serving.  Lean.  · \"Lean\" and \"extra lean\" are U.S. Department of Agriculture (USDA) terms for use on meat and poultry products. \"Lean\" means the product contains less than 10 g of fat, 4 g of saturated fat, and 95 mg of cholesterol per serving. \"Lean\" is not as low in fat as a product labeled \"low.\"  Extra Lean.  · \"Extra lean\" means the product contains less than 5 g of fat, 2 g of saturated fat, and 95 mg of cholesterol per serving. While \"extra lean\" has less fat than \"lean,\" it is still higher in fat than a product labeled \"low.\"  Reduced, Less, Fewer.  · A diet product that contains 25% less of a nutrient or calories than the regular version. For example, hot dogs might be labeled \"25% less fat than our regular hot dogs.\"  Light/Lite.  · A diet product that contains  " "fewer calories or ½ the fat of the original. For example, \"light in sodium\" means a product with ½ the usual sodium.  More.  · One serving contains at least 10% more of the daily value of a vitamin, mineral, or fiber than usual.  Good Source Of.  · One serving contains 10% to 19% of the daily value for a particular vitamin, mineral, or fiber.  Excellent Source Of.  · One serving contains 20% or more of the daily value for a particular nutrient. Other terms used might be \"high in\" or \"rich in.\"  Enriched or Fortified.  · The product contains added vitamins, minerals, or protein. Nutrition labeling must be used on enriched or fortified foods.  Imitation.  · The product has been altered so that it is lower in protein, vitamins, or minerals than the usual food,such as imitation peanut butter.  Total Fat.  · The number listed is the total of all fat found in a serving of the product. Under total fat, food labels must list saturated fat and trans fat, which are associated with raising bad cholesterol and an increased risk of heart blood vessel disease.  Saturated Fat.  · Mainly fats from animal-based sources. Some examples are red meat, cheese, cream, whole milk, and coconut oil.  Trans Fat.  · Found in some fried snack foods, packaged foods, and fried restaurant foods. It is recommended you eat as close to 0 g of trans fat as possible, since it raises bad cholesterol and lowers good cholesterol.  Polyunsaturated and Monounsaturated Fats.  · More healthful fats. These fats are from plant sources.  Total Carbohydrate.  · The number of carbohydrate grams in a serving of the product. Under total carbohydrate are listed the other carbohydrate sources, such as dietary fiber and sugars.  Dietary Fiber.  · A carbohydrate from plant sources.  Sugars.  · Sugars listed on the label contain all naturally occurring sugars as well as added sugars.  LABELING TERMS NOT REGULATED BY THE FDA  Sugarless.  · Table sugar (sucrose) has not " "been added. However, the  may use another form of sugar in place of sucrose to carol the product. For example, sugar alcohols are used to carol foods. Sugar alcohols are a form of sugar but are not table sugar. If a product contains sugar alcohols in place of sucrose, it can still be labeled \"sugarless.\"  Low Salt, Salt-Free, Unsalted, No Salt, No Salt Added, Without Added Salt.  · Food that is usually processed with salt has been made without salt. However, the food may contain sodium-containing additives, such as preservatives, leavening agents, or flavorings.  Natural.  · This term has no legal meaning.  Organic.  · Foods that are certified as organic have been inspected and approved by the [employer] to ensure they are produced without pesticides, fertilizers containing synthetic ingredients, bioengineering, or ionizing radiation.  Document Released: 12/20/2004 Document Revised: 03/11/2013 Document Reviewed: 07/08/2010  [employer]® Patient Information ©2014 [employer].    Diabetes Mellitus and Food  It is important for you to manage your blood sugar (glucose) level. Your blood glucose level can be greatly affected by what you eat. Eating healthier foods in the appropriate amounts throughout the day at about the same time each day will help you control your blood glucose level. It can also help slow or prevent worsening of your diabetes mellitus. Healthy eating may even help you improve the level of your blood pressure and reach or maintain a healthy weight.  General recommendations for healthful eating and cooking habits include:  · Eating meals and snacks regularly. Avoid going long periods of time without eating to lose weight.  · Eating a diet that consists mainly of plant-based foods, such as fruits, vegetables, nuts, legumes, and whole grains.  · Using low-heat cooking methods, such as baking, instead of high-heat cooking methods, such as deep frying.  Work with your dietitian to make sure you " understand how to use the Nutrition Facts information on food labels.  How can food affect me?  Carbohydrates   Carbohydrates affect your blood glucose level more than any other type of food. Your dietitian will help you determine how many carbohydrates to eat at each meal and teach you how to count carbohydrates. Counting carbohydrates is important to keep your blood glucose at a healthy level, especially if you are using insulin or taking certain medicines for diabetes mellitus.  Alcohol   Alcohol can cause sudden decreases in blood glucose (hypoglycemia), especially if you use insulin or take certain medicines for diabetes mellitus. Hypoglycemia can be a life-threatening condition. Symptoms of hypoglycemia (sleepiness, dizziness, and disorientation) are similar to symptoms of having too much alcohol.  If your health care provider has given you approval to drink alcohol, do so in moderation and use the following guidelines:  · Women should not have more than one drink per day, and men should not have more than two drinks per day. One drink is equal to:  ¨ 12 oz of beer.  ¨ 5 oz of wine.  ¨ 1½ oz of hard liquor.  · Do not drink on an empty stomach.  · Keep yourself hydrated. Have water, diet soda, or unsweetened iced tea.  · Regular soda, juice, and other mixers might contain a lot of carbohydrates and should be counted.  What foods are not recommended?  As you make food choices, it is important to remember that all foods are not the same. Some foods have fewer nutrients per serving than other foods, even though they might have the same number of calories or carbohydrates. It is difficult to get your body what it needs when you eat foods with fewer nutrients. Examples of foods that you should avoid that are high in calories and carbohydrates but low in nutrients include:  · Trans fats (most processed foods list trans fats on the Nutrition Facts label).  · Regular soda.  · Juice.  · Candy.  · Sweets, such as cake,  pie, doughnuts, and cookies.  · Fried foods.  What foods can I eat?  Eat nutrient-rich foods, which will nourish your body and keep you healthy. The food you should eat also will depend on several factors, including:  · The calories you need.  · The medicines you take.  · Your weight.  · Your blood glucose level.  · Your blood pressure level.  · Your cholesterol level.  You should eat a variety of foods, including:  · Protein.  ¨ Lean cuts of meat.  ¨ Proteins low in saturated fats, such as fish, egg whites, and beans. Avoid processed meats.  · Fruits and vegetables.  ¨ Fruits and vegetables that may help control blood glucose levels, such as apples, mangoes, and yams.  · Dairy products.  ¨ Choose fat-free or low-fat dairy products, such as milk, yogurt, and cheese.  · Grains, bread, pasta, and rice.  ¨ Choose whole grain products, such as multigrain bread, whole oats, and brown rice. These foods may help control blood pressure.  · Fats.  ¨ Foods containing healthful fats, such as nuts, avocado, olive oil, canola oil, and fish.  Does everyone with diabetes mellitus have the same meal plan?  Because every person with diabetes mellitus is different, there is not one meal plan that works for everyone. It is very important that you meet with a dietitian who will help you create a meal plan that is just right for you.  This information is not intended to replace advice given to you by your health care provider. Make sure you discuss any questions you have with your health care provider.  Document Released: 09/14/2006 Document Revised: 05/25/2017 Document Reviewed: 11/14/2014  [employer] Interactive Patient Education © 2017 [employer]    How and Where to Give Subcutaneous Insulin Injections, Adult  People with type 1 diabetes must take insulin since their bodies do not make it. People with type 2 diabetes may require insulin. There are many different types of insulin as well as other injectable diabetes medicines that  are meant to be injected into the fat layer under your skin. The type of insulin or injectable diabetes medicine you take may determine how many injections you give yourself and when to take the injections.  Choosing a site for injection  Insulin absorption varies from site to site. As with any injectable medication it is best for the insulin to be injected within the same body region. However, do not inject the insulin in the same spot each time. Rotating the spots you give your injections will prevent inflammation or tissue breakdown. There are four main regions that can be used for injections. The regions include the:  · Abdomen (preferred region, especially for non-insulin injectable diabetes medicine).  · Front and upper outer sides of thighs.  · Back of upper arm.  · Buttocks.  Using a syringe and vial  Drawing up insulin: single insulin dose   1. Wash your hands with soap and water.  2. Gently roll the insulin bottle (vial) between your hands to mix it. Do not shake the vial.  3. Clean the top rubber part of the vial with an alcohol wipe. Be sure that the plastic pop-top has been removed on newer vials.  4. Remove the plastic cover from the needle on the syringe. Do not let the needle touch anything.  5. Pull the plunger back to draw air into the syringe. The air should be the same amount as the insulin dose.  6. Push the needle through the rubber on the top of the vial. Do not turn the vial over.  7. Push the plunger in all the way to put the air into the vial.  8. Leave the needle in the vial and turn the vial and syringe upside down.  9. Pull down slowly on the plunger, drawing the amount of insulin you need into the syringe.  10. Look for air bubbles in the syringe. You may need to push the plunger up and down 2 to 3 times to slowly get rid of any air bubbles in the syringe.  11. Pull back the plunger to get your correct dose.  12. Remove the needle from the vial.  13. Use an alcohol wipe to clean the  "area of the body to be injected.  14. Pinch up 1 inch of skin and hold it.  15. Put the needle straight into the skin (90-degree angle). Put the needle in as far as it will go (to the hub). The needle may need to be injected at a 45-degree angle in small adults with little fat.  16. When the needle is in, you can let go of your skin.  17. Push the plunger down all the way to inject the insulin.  18. Pull the needle straight out of the skin.  19. Press the alcohol wipe over the spot where you gave your injection. Keep it there for a few seconds. Do not rub the area.  20. Do not put the plastic cover back on the needle.  Drawing up insulin: mixing 2 insulins  1. Wash your hands with soap and water.  2. Gently roll the vial of \"cloudy\" insulin between your hands or rotate the vial from top to bottom to mix.  3. Clean the top of both vials with an alcohol wipe. Be sure that the plastic pop-top lid has been removed on newer vials.  4. Pull air into the syringe to equal the dose of \"cloudy\" insulin.  5. Stick the needle into the \"cloudy\" insulin vial and inject the air. Be sure to keep the vial upright.  6. Remove the needle from the \"cloudy\" insulin vial.  7. Pull air into the syringe to equal the dose of \"clear\" insulin.  8. Stick the needle into the \"clear\" insulin vial and inject the air.  9. Leave the needle in the \"clear\" insulin vial and turn the vial upside down.  10. Pull down on the plunger and slowly draw into the syringe the number of units of \"clear\" insulin desired.  11. Look for air bubbles in the syringe. You may need to push the plunger up and down 2 to 3 times to slowly get rid of any air bubbles in the syringe.  12. Remove the needle from the \"clear\" insulin vial.  13. Stick the needle into the \"cloudy\" insulin vial. Do not inject any of the \"clear\" insulin into the \"cloudy\" vial.  14. Turn the \"cloudy\" vial upside down and pull the plunger down to the number of units that equals the total number of " "units of \"clear\" and \"cloudy\" insulins.  15. Remove the needle from the \"cloudy\" insulin vial.  16. Use an alcohol wipe to clean the area of the body to be injected.  17. Put the needle straight into the skin (90-degree angle). Put the needle in as far as it will go (to the hub). The needle may need to be injected at a 45-degree angle in small adults with little fat.  18. When the needle is in, you can let go of your skin.  19. Push the plunger down all the way to inject the insulin.  20. Pull the needle straight out of the skin.  21. Press the alcohol wipe over the spot where you gave your injection. Keep it there for a few seconds. Do not rub the area.  22. Do not put the plastic cover back on the needle.  Using an insulin pen    2. Wash your hands with soap and water.  3. If you are using the \"cloudy\" insulin, roll the pen between your palms several times or rotate the pen top to bottom several times.  4. Remove the insulin pen cap.  5. Clean the rubber stopper of the cartridge with an alcohol wipe.  6. Remove the protective paper tab from the disposable needle.  7. Screw the needle onto the pen.  8. Remove the outer plastic needle cover.  9. Remove the inner plastic needle cover.  10. Prime the insulin pen by turning the button (dial) to 2 units. Hold the pen with the needle pointing up, and push the dial on the opposite end until a drop of insulin appears at the needle tip. If no insulin appears, repeat this step.  11. Dial the number of units of insulin you will inject.  12. Use an alcohol wipe to clean the area of the body to be injected.  13. Pinch up 1 inch of skin and hold it.  14. Put the needle straight into the skin (90-degree angle).  15. Push the dial down to push the insulin into the fat tissue.  16. Count to 10 slowly. Then, remove the needle from the fat tissue.  17. Carefully replace the larger outer plastic needle cover over the needle and unscrew the capped needle.  Throwing away " supplies  · Discard used needles in a puncture proof sharps disposal container. Follow disposal regulations for the area where you live.  · Vials and empty disposable pens may be thrown away in the regular trash.  This information is not intended to replace advice given to you by your health care provider. Make sure you discuss any questions you have with your health care provider.  Document Released: 03/09/2005 Document Revised: 05/25/2017 Document Reviewed: 05/27/2014  [employer] Interactive Patient Education © 2017 [employer]    Insulin Lispro injection  What is this medicine?  INSULIN LISPRO (IN kelley soumya LYE sproe) is a human-made form of insulin. This drug lowers the amount of sugar in your blood. This medicine is a rapid-acting insulin that starts working faster than regular insulin. It will not work as long as regular insulin.  This medicine may be used for other purposes; ask your health care provider or pharmacist if you have questions.  COMMON BRAND NAME(S): Humalog, Humalog Parveen KwikPen, Humalog KwikPen  What should I tell my health care provider before I take this medicine?  They need to know if you have any of these conditions:  -episodes of hypoglycemia  -kidney disease  -liver disease  -an unusual or allergic reaction to insulin, metacresol, other medicines, foods, dyes, or preservatives  -pregnant or trying to get pregnant  -breast-feeding  How should I use this medicine?  This medicine is for injection under the skin or infusion into a vein. This medicine may be given by health care professional in a hospital or clinic setting. It is important to follow the directions given to you by your health care professional or doctor. You should inject this medicine within 15 minutes before or after your meal. Have food ready before injection. Do not delay eating. You will be taught how to use this medicine and how to adjust doses for activities and illness. Do not use more insulin than prescribed. Do not use  more or less often than prescribed.  Always check the appearance of your insulin before using it. This medicine should be clear and colorless like water. Do not use it if it is cloudy, thickened, colored, or has solid particles in it.  It is important that you put your used needles and syringes in a special sharps container. Do not put them in a trash can. If you do not have a sharps container, call your pharmacist or healthcare provider to get one.  Talk to your pediatrician regarding the use of this medicine in children. Special care may be needed.  Overdosage: If you think you have taken too much of this medicine contact a poison control center or emergency room at once.  NOTE: This medicine is only for you. Do not share this medicine with others.  What if I miss a dose?  It is important not to miss a dose. Your health care professional or doctor should discuss a plan for missed doses with you. If you do miss a dose, follow their plan. Do not take double doses.  What may interact with this medicine?  -other medicines for diabetes  Many medications may cause an increase or decrease in blood sugar, these include:  -alcohol containing beverages  -aspirin and aspirin-like drugs  -chloramphenicol  -chromium  -diuretics  -female hormones, like estrogens or progestins and birth control pills  -heart medicines  -isoniazid  -male hormones or anabolic steroids  -medicines for weight loss  -medicines for allergies, asthma, cold, or cough  -medicines for mental problems  -medicines called MAO Inhibitors like Nardil, Parnate, Marplan, Eldepryl  -niacin  -NSAIDs, medicines for pain and inflammation, like ibuprofen or naproxen  -pentamidine  -phenytoin  -probenecid  -quinolone antibiotics like ciprofloxacin, levofloxacin, ofloxacin  -some herbal dietary supplements  -steroid medicines like prednisone or cortisone  -thyroid medicine  Some medications can hide the warning symptoms of low blood sugar. You may need to monitor your  blood sugar more closely if you are taking one of these medications. These include:  -beta-blockers such as atenolol, metoprolol, propranolol  -clonidine  -guanethidine  -reserpine  This list may not describe all possible interactions. Give your health care provider a list of all the medicines, herbs, non-prescription drugs, or dietary supplements you use. Also tell them if you smoke, drink alcohol, or use illegal drugs. Some items may interact with your medicine.  What should I watch for while using this medicine?  Visit your health care professional or doctor for regular checks on your progress.  A test called the HbA1C (A1C) will be monitored. This is a simple blood test. It measures your blood sugar control over the last 2 to 3 months. You will receive this test every 3 to 6 months.  Learn how to check your blood sugar. Learn the symptoms of low and high blood sugar and how to manage them.  Always carry a quick-source of sugar with you in case you have symptoms of low blood sugar. Examples include hard sugar candy or glucose tablets. Make sure others know that you can choke if you eat or drink when you develop serious symptoms of low blood sugar, such as seizures or unconsciousness. They must get medical help at once.  Tell your doctor or health care professional if you have high blood sugar. You might need to change the dose of your medicine. If you are sick or exercising more than usual, you might need to change the dose of your medicine.  Do not skip meals. Ask your doctor or health care professional if you should avoid alcohol. Many nonprescription cough and cold products contain sugar or alcohol. These can affect blood sugar.  Make sure that you have the right kind of syringe for the type of insulin you use. Try not to change the brand and type of insulin or syringe unless your health care professional or doctor tells you to. Switching insulin brand or type can cause dangerously high or low blood sugar.  Always keep an extra supply of insulin, syringes, and needles on hand. Use a syringe one time only. Throw away syringe and needle in a closed container to prevent accidental needle sticks.  Insulin pens and cartridges should never be shared. Even if the needle is changed, sharing may result in passing of viruses like hepatitis or HIV.  Wear a medical ID bracelet or chain, and carry a card that describes your disease and details of your medicine and dosage times.  What side effects may I notice from receiving this medicine?  Side effects that you should report to your doctor or health care professional as soon as possible:  -allergic reactions like skin rash, itching or hives, swelling of the face, lips, or tongue  -breathing problems  -signs and symptoms of high blood sugar such as dizziness, dry mouth, dry skin, fruity breath, nausea, stomach pain, increased hunger or thirst, increased urination  -signs and symptoms of low blood sugar such as feeling anxious, confusion, dizziness, increased hunger, unusually weak or tired, sweating, shakiness, cold, irritable, headache, blurred vision, fast heartbeat, loss of consciousness  Side effects that usually do not require medical attention (report to your doctor or health care professional if they continue or are bothersome):  -increase or decrease in fatty tissue under the skin due to overuse of a particular injection site  -itching, burning, swelling, or rash at site where injected  This list may not describe all possible side effects. Call your doctor for medical advice about side effects. You may report side effects to FDA at 9-492-FDA-3470.  Where should I keep my medicine?  Keep out of the reach of children.  Store unopened insulin vials in a refrigerator between 2 and 8 degrees C (36 and 46 degrees F). Do not freeze or use if the insulin has been frozen. Opened vials (vials currently in use) may be stored in the refrigerator or at room temperature, at approximately  30 degrees C (86 degrees F) or cooler. Keeping your insulin at room temperature decreases the amount of pain during injection. Once opened, your insulin can be used for 28 days. After 28 days, the vial of insulin should be thrown away.  Store unopened cartridges or disposable pens in a refrigerator between 2 and 8 degrees C (36 and 46 degrees F.) Do not freeze or use if the insulin has been frozen. Once opened, the disposable pens and cartridges that are inserted into pens should be kept at room temperature, approximately 30 degrees C (80 degrees F) or cooler. Do not store in the refrigerator. Once opened, the insulin can be used for 28 days. After 28 days, the cartridge or disposable pen should be thrown away.  Protect from light and excessive heat. Throw away any unused medicine after the expiration date or after the specified time for room temperature storage has passed.  NOTE: This sheet is a summary. It may not cover all possible information. If you have questions about this medicine, talk to your doctor, pharmacist, or health care provider.  © 2018 [employer] (2017-01-19 10:09:05)    Insulin Glargine injection  What is this medicine?  INSULIN GLARGINE (IN kelley soumya GLAR geen) is a human-made form of insulin. This drug lowers the amount of sugar in your blood. It is a long-acting insulin that is usually given once a day.  This medicine may be used for other purposes; ask your health care provider or pharmacist if you have questions.  COMMON BRAND NAME(S): BASAGLAR, Lantus, Lantus SoloStar, Toujeo SoloStar  What should I tell my health care provider before I take this medicine?  They need to know if you have any of these conditions:  -episodes of hypoglycemia  -kidney disease  -liver disease  -an unusual or allergic reaction to insulin, metacresol, other medicines, foods, dyes, or preservatives  -pregnant or trying to get pregnant  -breast-feeding  How should I use this medicine?  This medicine is for  injection under the skin. Use this medicine at the same time each day. Use exactly as directed. This insulin should never be mixed in the same syringe with other insulins before injection. Do not vigorously shake before use. You will be taught how to use this medicine and how to adjust doses for activities and illness. Do not use more insulin than prescribed.  Always check the appearance of your insulin before using it. This medicine should be clear and colorless like water. Do not use it if it is cloudy, thickened, colored, or has solid particles in it.  It is important that you put your used needles and syringes in a special sharps container. Do not put them in a trash can. If you do not have a sharps container, call your pharmacist or healthcare provider to get one.  Talk to your pediatrician regarding the use of this medicine in children. Special care may be needed.  Overdosage: If you think you have taken too much of this medicine contact a poison control center or emergency room at once.  NOTE: This medicine is only for you. Do not share this medicine with others.  What if I miss a dose?  It is important not to miss a dose. Your health care professional or doctor should discuss a plan for missed doses with you. If you do miss a dose, follow their plan. Do not take double doses.  What may interact with this medicine?  -other medicines for diabetes  Many medications may cause an increase or decrease in blood sugar, these include:  -alcohol containing beverages  -aspirin and aspirin-like drugs  -chloramphenicol  -chromium  -diuretics  -female hormones, like estrogens or progestins and birth control pills  -heart medicines  -isoniazid  -male hormones or anabolic steroids  -medicines for weight loss  -medicines for allergies, asthma, cold, or cough  -medicines for mental problems  -medicines called MAO Inhibitors like Nardil, Parnate, Marplan, Eldepryl  -niacin  -NSAIDs, medicines for pain and inflammation, like  ibuprofen or naproxen  -pentamidine  -phenytoin  -probenecid  -quinolone antibiotics like ciprofloxacin, levofloxacin, ofloxacin  -some herbal dietary supplements  -steroid medicines like prednisone or cortisone  -thyroid medicine  Some medications can hide the warning symptoms of low blood sugar. You may need to monitor your blood sugar more closely if you are taking one of these medications. These include:  -beta-blockers such as atenolol, metoprolol, propranolol  -clonidine  -guanethidine  -reserpine  This list may not describe all possible interactions. Give your health care provider a list of all the medicines, herbs, non-prescription drugs, or dietary supplements you use. Also tell them if you smoke, drink alcohol, or use illegal drugs. Some items may interact with your medicine.  What should I watch for while using this medicine?  Visit your health care professional or doctor for regular checks on your progress.  Do not drive, use machinery, or do anything that needs mental alertness until you know how this medicine affects you. Alcohol may interfere with the effect of this medicine. Avoid alcoholic drinks.  A test called the HbA1C (A1C) will be monitored. This is a simple blood test. It measures your blood sugar control over the last 2 to 3 months. You will receive this test every 3 to 6 months.  Learn how to check your blood sugar. Learn the symptoms of low and high blood sugar and how to manage them.  Always carry a quick-source of sugar with you in case you have symptoms of low blood sugar. Examples include hard sugar candy or glucose tablets. Make sure others know that you can choke if you eat or drink when you develop serious symptoms of low blood sugar, such as seizures or unconsciousness. They must get medical help at once.  Tell your doctor or health care professional if you have high blood sugar. You might need to change the dose of your medicine. If you are sick or exercising more than usual, you  might need to change the dose of your medicine.  Do not skip meals. Ask your doctor or health care professional if you should avoid alcohol. Many nonprescription cough and cold products contain sugar or alcohol. These can affect blood sugar.  Make sure that you have the right kind of syringe for the type of insulin you use. Try not to change the brand and type of insulin or syringe unless your health care professional or doctor tells you to. Switching insulin brand or type can cause dangerously high or low blood sugar. Always keep an extra supply of insulin, syringes, and needles on hand. Use a syringe one time only. Throw away syringe and needle in a closed container to prevent accidental needle sticks.  Insulin pens and cartridges should never be shared. Even if the needle is changed, sharing may result in passing of viruses like hepatitis or HIV.  Wear a medical ID bracelet or chain, and carry a card that describes your disease and details of your medicine and dosage times.  What side effects may I notice from receiving this medicine?  Side effects that you should report to your doctor or health care professional as soon as possible:  -allergic reactions like skin rash, itching or hives, swelling of the face, lips, or tongue  -breathing problems  -signs and symptoms of high blood sugar such as dizziness, dry mouth, dry skin, fruity breath, nausea, stomach pain, increased hunger or thirst, increased urination  -signs and symptoms of low blood sugar such as feeling anxious, confusion, dizziness, increased hunger, unusually weak or tired, sweating, shakiness, cold, irritable, headache, blurred vision, fast heartbeat, loss of consciousness  Side effects that usually do not require medical attention (report to your doctor or health care professional if they continue or are bothersome):  -increase or decrease in fatty tissue under the skin due to overuse of a particular injection site  -itching, burning, swelling, or  rash at site where injected  This list may not describe all possible side effects. Call your doctor for medical advice about side effects. You may report side effects to FDA at 4-446-ORO-9742.  Where should I keep my medicine?  Keep out of the reach of children.  Store unopened vials in a refrigerator between 2 and 8 degrees C (36 and 46 degrees F). Do not freeze or use if the insulin has been frozen. Opened vials (vials currently in use) may be stored in the refrigerator or at room temperature, at approximately 25 degrees C (77 degrees F) or cooler. Keeping your insulin at room temperature decreases the amount of pain during injection. Once opened, your insulin can be used for 28 days. After 28 days, the vial should be thrown away.  Store Lantus Solostar Pens or Basaglar KwikPens in a refrigerator between 2 and 8 degrees C (36 and 46 degrees F) or at room temperature below 30 degrees C (86 degrees F). Do not freeze or use if the insulin has been frozen. Once opened, the pens should be kept at room temperature. Do not store in the refrigerator once opened. Once opened, the insulin can be used for 28 days. After 28 days, the Lantus Solostar Pen or Basaglar KwikPen should be thrown away.  Store Toujeo Solostar Pens in a refrigerator between 2 and 8 degrees C (36 and 46 degrees F). Do not freeze or use if the insulin has been frozen. Once opened, the pens should be kept at room temperature below 30 degrees C (86 degrees F). Do not store in the refrigerator once opened. Once opened, the insulin can be used for 42 days. After 42 days, the Toujeo Solostar Pen should be thrown away.  Protect all insulin vials and pens from light and excessive heat. Throw away any unused medicine after the expiration date or after the specified time for room temperature storage has passed.  NOTE: This sheet is a summary. It may not cover all possible information. If you have questions about this medicine, talk to your doctor, pharmacist, or  health care provider.  © 2018 [employer] (2017-01-19 10:19:14)      Vacuum-Assisted Closure Therapy  Vacuum-assisted closure (VAC) therapy uses a device that removes fluid and germs from wounds to help them heal. It is used on wounds that cannot be closed with stitches. They often heal slowly. Vacuum-assisted therapy helps the wound stay clean and healthy while the open wound slowly grows back together.  Vacuum-assisted closure therapy uses a bandage (dressing) that is made of foam. It is put inside the wound. Then, a drape is placed over the wound. This drape sticks to your skin to keep air out, and to protect the wound. A tube is hooked up to a small pump and is attached to the drape. The pump sucks out the fluid and germs. Vacuum-assisted closure therapy can also help reduce the bad smell that comes from the wound.  HOW DOES IT WORK?   The vacuum pump pulls fluid through the foam dressing. The dressing may wrinkle during this process. The fluid goes into the tube and away from the wound. The fluid then goes into a container. The fluid in the container must be replaced if it is full or at least once a week, even if the container is not full. The pulling from the pump helps to close the wound and bring better circulation to the wound area.  The foam dressing covers and protects the wound. It helps your wound heal faster.   HOW DOES IT FEEL?   · You might feel a little pulling when the pump is on.  · You might also feel a mild vibrating sensation.    · You might feel some discomfort when the dressing is taken off.  CAN I MOVE AROUND WITH VACUUM-ASSISTED CLOSURE THERAPY?  Yes, it has a backup battery which is used when the machine is not plugged in, as long as the battery is working, you can move freely.  WHAT ARE SOME THINGS I MUST KNOW?  · Do not turn off the pump yourself, unless instructed to do so by your healthcare provider, such as for bathing.  · Do not take off the dressing yourself, unless  instructed to do so by your caregiver.  · You can wash or shower with the dressing. However, do not take the pump into the shower. Make sure the wound dressing is protected and covered with plastic. The wound area must stay dry.  · Do not turn off the pump for more than 2 hours. If the pump is off for more than 2 hours, your nurse must change your dressing.  · Check frequently that the machine is on, that the machine indicates the therapy is on, and that all clamps are open.  THE ALARM IS SOUNDING! WHAT SHOULD I DO?   · Stay calm.  · Do not turn off the pump or do anything with the dressing.  · Call your clinic or caregiver right away if the alarm goes off and you cannot fix the problem. Some reasons the alarm might go off include:  ¨ The fluid collection container is full.  ¨ The battery is low.  ¨ The dressing has a leak.  · Explain to your caregiver what is happening. Follow the instructions you receive.  WHEN SHOULD I CALL FOR HELP?   · You have severe pain.  · You have difficulty breathing.  · You have bleeding that will not stop.  · Your wound smells bad.  · You have redness, swelling, or fluid leaking from your wound.  · Your alarm goes off and you do not know what to do.  · You have a fever.  · Your wound itches severely.  · Your dressing changes are often painful or bleeding often occurs.  · You have diarrhea.  · You have a sore throat.  · You have a rash around the dressing or anywhere else on your body.  · You feel nauseous.  · You feel dizzy or weak.  · The VAC machine has been off for more than 2 hours.  HOW DO I GET READY TO GO HOME WITH A PUMP?   A trained caregiver will talk to you and answer your questions about your vacuum-assisted closure therapy before you go home. He or she will explain what to expect. A caregiver will come to your home to apply the pump and care for your wound.  The at-home caregiver will be available for questions and will come back for the scheduled dressing changes, usually  every 48-72 hours (or more often for severely infected wounds). Your at-home caregiver will also come if you are having an unexpected problem. If you have questions or do not know what to do when you go home, talk to your healthcare provider.  This information is not intended to replace advice given to you by your health care provider. Make sure you discuss any questions you have with your health care provider.  Document Released: 11/30/2009 Document Revised: 08/20/2014 Document Reviewed: 09/22/2016  [employer] Interactive Patient Education © 2017 [employer]      Vacuum-Assisted Closure Therapy Home Guide  Vacuum-assisted closure therapy (VAC therapy) is a device that helps wounds heal. It is used on wounds that cannot be closed with stitches. They often heal slowly. VAC therapy helps the wound stay clean and healthy while its edges slowly grow back together.  VAC therapy uses a bandage (dressing) that is made of foam. It is put inside the wound. Then, a drape is placed over the wound. This drape sticks to your skin (adhesive) to keep air out. A tube is hooked up to a small pump and is attached to the drape. The pump sucks fluid and germs from the wound. It can also decrease any bad smell that comes from the wound.  RISKS AND COMPLICATIONS  VAC therapy is usually safe to use at home. Your skin may get sore from the adhesive drape. That is the most common problem. However, more serious problems can develop, such as:   · Bleeding. This can happen if the dressing in the wound comes into contact with blood vessels. A little bleeding may occur when the dressing is being changed. This is normal now and then. Major bleeding can happen if a large blood vessel breaks. This is more likely if you are taking blood-thinning medicine. Emergency surgery may be needed.  · Infection. This can happen if the dressing has an air leak that is not repaired within a couple of hours.  · Dehydration. This can happen if the pump sucks out  too much body fluid.  DRESSING CHANGES  Your dressing will have to be changed. Sometimes this is needed once a day. Other times, a dressing change must be done 3 times a week. How often you change your dressing will depend on what your wound is like. A trained caregiver will most likely change the dressing. However, a family member or friend may be trained to change the dressing. Below are steps to change a dressing in order to prevent an infection. The steps apply to you or the person that changes your dressing.  · Wash your hands with soap and water before and after each dressing change.  · Wear gloves and protective clothing. This may include eye protection.  · Do not allow anyone to change your dressing if they have an infection or a skin condition. Even a small cut can be a problem.  To change the dressing:   · Turn off the pump.  · Take off the adhesive drape.  · Disconnect the tube from the dressing.  · Take out the dressing that is inside the wound. If the dressing sticks, use a germ-free (sterile), saltwater solution to wet the dressing. This helps it come out more easily. If it hurts when the dressing is changed, take pain medicine 30 minutes before the dressing change.  · Cleanse the wound with normal saline or sterile water.  · Apply a skin barrier film to the skin that will be covered with the drape. This will protect the skin.  · Put a new dressing into the wound.  · Apply a new drape and tube.  · Replace the container in the pump that collects fluid if it is full. Do this at least once per week.  · Turn the pump back on.  · Your doctor will decide what setting of suction is best. Do not change the settings on the machine without talking to your nurse or doctor.  HOME CARE INSTRUCTIONS   · The VAC pump has an alarm. It goes off if there are any problems such as a leak.  ¨ Ask your caregiver what to do if the alarm goes off.  ¨ Call your caregiver right away if the alarm goes off and you cannot fix the  problem.  · Do not turn off the pump for more than 2 hours.  · Check your wound carefully at each dressing change for signs of infection. Watch for redness, swelling, or any fluid leaking from the wound. If you develop an infection:  ¨ You may have to stop VAC therapy.  ¨ The wound will need to be cleaned and washed out.  ¨ You will have to take antibiotic medicine.  · Ask your caregiver what activities you should or should not do while you are getting VAC therapy. This will depend on your particular wound.  · Ask if it is okay to turn off the pump so you can take a shower. If it is okay, make sure the wound is covered with plastic. The wound area must stay dry.  · Drink enough fluids to keep your urine clear or pale yellow.  · Eat foods that contain a lot of protein. Examples are meat, poultry, seafood, eggs, nuts, beans, and peas. Protein can help your wound heal.  SEEK MEDICAL CARE IF:  · Your wound itches or hurts.  · Dressing changes are often painful or bleeding often occurs.  · You have a headache.  · You have diarrhea.  · You have a sore throat.  · You have a rash.  · You feel nauseous.  · You feel dizzy or weak.  SEEK IMMEDIATE MEDICAL CARE IF:   · You have very bad pain.  · You have bleeding that will not stop.  · Your wound smells bad.  · You have redness, swelling, or fluid leaking from your wound.  · Your alarm goes off and you do not know what to do.  · You have a fever.  This information is not intended to replace advice given to you by your health care provider. Make sure you discuss any questions you have with your health care provider.  Document Released: 03/11/2013 Document Reviewed: 09/22/2016  [employer] Interactive Patient Education © 2017 [employer]    Depression / Suicide Risk    As you are discharged from this [facility] facility, it is important to learn how to keep safe from harming yourself.    Recognize the warning signs:  · Abrupt changes in personality, positive or negative-  including increase in energy   · Giving away possessions  · Change in eating patterns- significant weight changes-  positive or negative  · Change in sleeping patterns- unable to sleep or sleeping all the time   · Unwillingness or inability to communicate  · Depression  · Unusual sadness, discouragement and loneliness  · Talk of wanting to die  · Neglect of personal appearance   · Rebelliousness- reckless behavior  · Withdrawal from people/activities they love  · Confusion- inability to concentrate     If you or a loved one observes any of these behaviors or has concerns about self-harm, here's what you can do:  · Talk about it- your feelings and reasons for harming yourself  · Remove any means that you might use to hurt yourself (examples: pills, rope, extension cords, firearm)  · Get professional help from the community (Mental Health, Substance Abuse, psychological counseling)  · Do not be alone:Call your Safe Contact- someone whom you trust who will be there for you.  · Call your local CRISIS HOTLINE 819-9503 or 769-116-1162  · Call your local Children's Mobile Crisis Response Team [place] (663) 109-9090 or www.GoodChime!  · Call the toll free National Suicide Prevention Hotlines   · National Suicide Prevention Lifeline 293-637-UKOF (8879)  · National Hope Line Network 800-SUICIDE (113-7466)

## 2019-07-02 NOTE — PROGRESS NOTES
LIMB PRESERVATION SERVICE      HPI:  52 y/o male with DM. Admitted 6/21/19 for PVD.   LPS consulted for L dorsal foot ulcers.   Pt reports he was in [place], wore leather shoes that were too tight and developed blisters in March 2019. He cleaned ulcers with hydrogen peroxide but the ulcers worsened. He went to urgent care, was referred to wound clinic in May 2019 and arterial studies were ordered. He did not complete studies until 5/24. Once studies were completed, he was scheduled to be seen by Dr. Graham at the clinic to discuss results however pt went out of town and was not seen until 6/17. After seeing Dr. Graham, direct admission was arranged for surgery but pt had difficulty grasping the seriousness of his condition. He did not present to hospital until 6/21/19.       Diagnosed with DM 15 years ago. Checks daily. Averages 1000-110s. Controlled on orals. Has numbness to feet. Wears sports shoes. Does not have diabetic shoes. Has been seen by CDE.       SURGERY DATE: 6/21/19 by Dr. Chatterjee  PROCEDURE:   Left dorsalis pedis angioplasty  Left anterior tibial artery angioplasty and atherectomy  Left posterior tibial artery angioplasty     SURGERY DATE: 6/24/19 by Dr. Chopra  PROCEDURE:   1.  Left foot irrigation and debridement, multiple compartments, including   anterior foot and toes.  2.  Left placement of skin substitute.  3.  Left placement of wound VAC.           6/27:Patient denies fevers, chills, nausea, vomiting.  Pain well controlled. Concerned about elevated blood sugars here and diet options. Has been seen by CDE this admission.   6/28: Patient's wife has brought in his offloading shoe from home.  Wound VAC functioning.  Seen by dietitian.  7/2: Wife present at bedside.  Asking how long it will take wound to heal.  Pain controlled.    /85   Pulse 88   Temp 36.1 °C (96.9 °F) (Temporal)   Resp 16   Ht 1.829 m (6')   Wt 76.2 kg (167 lb 15.9 oz)   SpO2 92%   BMI 22.78 kg/m²     SURGICAL SITE  ASSESSMENT:    Pedal Pulses: unable to palpate DP, PT pulses on R foot. +popliteal  With Doppler biphasic tones noted to PT, week times noted to DP but audible  Foot warm    Left dorsal forefoot ulcer distal and proximal with residual epi fix in place.  Bleeding tissue present however ischemic changes remain surrounding ulcers.  Patient also has left dorsal second toe ulcer and left dorsal third toe ulcer that has black-brown eschar.  Patient reports he has been putting coconut oil to these areas.  Advised patient to stop as advanced wound care therapy will be placed.    Ischemic changes to left lateral fifth MTH and left proximal fifth metatarsal.  Skin intact without fluctuance.    VAC change by wound team RN.  See note for further detail       DIABETES MANAGEMENT:  Blood glucose: 195  A1c:   Lab Results   Component Value Date/Time    HBA1C 8.9 (H) 06/28/2019 07:19 AM        Diabetes education: seen patient on 6/25/19    INFECTION MANAGEMENT:  WBC: 5.8 on 6/30  Wound culture results:   Results     ** No results found for the last 168 hours. **                 PLAN:  POD # 11 s/p angioplasty of L DP, AT, PT by Dr. Chatterjee  POD # 8 s/p L foot I & D with Epifix and VAC placement by Dr. Chopra      Wound care: NP WT at 125 mmHg continuous applied.  honeycolloid to L 2nd and 3rd toe ulcers    Antibiotics: None no signs and symptoms of infection noted today.        Weight Bearing Status: Weight bearing as tolerated to LLE    Offloading: Offloading shoe .  To be worn with ambulation.  Shoe at bedside    PT Consult:  involved    Diabetes Education: seen pt on 6/25/19. a1c in 6/2018 ws 12.3%.  A1c this admission 8.9%  Seen by JAE.    Plan to return to [facility].: No plans for further surgeries this admission    Limb salvage prognosis highly guarded.  Patient okay to discharge.  Has follow-up at wound care clinic on 7/5/2019.  Plan to continue with biologic and wound VAC therapy for advanced wound healing.  Authorization for [employer]  in progress.  Patient has been denied for epi fix application      DISCHARGE PLAN:    Disposition: Home.  Okay to discharge.  Wound care appointment on 7/5/2019.    Please arrange for home wound VAC    Follow-up: [facility].  Wound care appointment scheduled for 7/5/2019.  And LPS rounds for 7/12/19., wound clinic referral already in place    D/W: Patient, wife, RN, Anastasia BRINK, Dr. Keshav Rankin, A.P.R.N.    If any questions or concerns, please call [phone]

## 2019-07-02 NOTE — PROGRESS NOTES
Assumed care of pt.  AAOx4.  No c/o pain this morning.   Wound vac in place to LLE, due to be changed this morning.  Scabbed over wounds to LLE toes, PADMAJA.  Diabetic vegetarian diet in place, no c/o n/v.  LBM 7/1, + flatus, + void.  POC reviewed with pt.  Pt hoping to discharge home following wound vac change this morning.   Call light within reach, pt educated to call for assistance as needed.  Hourly rounding in place.

## 2019-07-02 NOTE — CARE PLAN
Problem: Safety  Goal: Will remain free from injury  Educated on safety and to call before getting up  Pt agreeable  Hourly rounding in place  Floor free of trip hazards  Possessions within reach.     Problem: Infection  Goal: Will remain free from infection  Vac intact  No s/s of infection  Education on oral care and hand hygiene given.     Problem: Pain Management  Goal: Pain level will decrease to patient's comfort goal  Denies pain  Resting comfortably.

## 2019-07-02 NOTE — CARE PLAN
Problem: Communication  Goal: The ability to communicate needs accurately and effectively will improve  Outcome: PROGRESSING AS EXPECTED  POC reviewed with pt. All questions answered at this time.     Problem: Discharge Barriers/Planning  Goal: Patient's continuum of care needs will be met  Outcome: PROGRESSING AS EXPECTED  Pt discharging home today after wound vac change. Spoke with diabetes educator, she will be in to see pt for education around lunch time.

## 2019-07-02 NOTE — PROGRESS NOTES
Received order for discharge.  Discharge instructions reviewed with pt.  Pt educated on use of home wound vac.  Pt able to teach back to this RN operation of vac machine.  Pt states he is comfortable caring for device at home.  Pt educated extensively on use of sliding scale at home and insulin injection.  Pt states comfort performing diabetes monitoring at home and able to teach back to this RN.  All belongings gathered including home wound vac supplies and FWW.  Pt escorted off unit with staff to discharge home with wife.

## 2019-07-02 NOTE — PROGRESS NOTES
Diabetes education: Met with patient and wife this afternoon to review insulin administration. Pt was taught last week but had not practiced giving his insulin. Spoke with Jamila BRINK and she had pt give his lunch time shot.   Pt has a meter and strips at home. Pt needed lots of reinforcement regarding insulin administration, goals for blood sugars and frequency of finger sticks. Pt was using glipizide and metformin at home ( BID until he ran low of glipizide and went back to once a day at dinner). Wife was concerned as she said at home his blood sugars were in the 90's and low 100's. Discussed what effects blood sugars and goal for blood sugars with an infection.  Pt was to go home on Lantus ( changed to Basaglar as Lantus not covered), Humalog 3 units tid meals and resume Metformin and Glipizide. Pt was using Lantus 22 units pm and regular insulin sliding scale coverage ac and hs as well as 3 units tid meals with blood sugars of 239 ( 3 units but refused the 3 unit meal bolus), 195 ( 2 units + 3 units), and 219 ( 3 units + 3 units). Pt was not restarted on his glipizide or Metformin in hospital before discharge.  CDE called [facility] pharmacy to confirm pens ordered and found that Lantus was not preferred but Basaglar was. Pt would need prescription changed and pen needles added.  CDE spoke with  and discussed concern regarding glipizide with 3 units tid vs sliding scale, need to change Lantus to Basaglar and order insulin pens. Pt to go home on sliding scale coverage with Humalog kwik pen ac only, Basaglar kwik  Pen and pen needles. Pt has strips at home, has their own PCP ( does not need PCP newly ordered) and will call them tomorrow. Pt was to continue glipizide bid but has only enough for once a day and was going to take with dinner. Recommend he take it with breakfast and inform PCP tomorrow.  Pt practiced with saline pens and reviewed sliding scale. Questions answered and handouts given.

## 2019-07-02 NOTE — DISCHARGE SUMMARY
Discharge Summary    CHIEF COMPLAINT ON ADMISSION  No chief complaint on file.      Reason for Admission  Peripheral vascular disease, unspe*     Admission Date  6/21/2019    CODE STATUS  Full Code    HPI & HOSPITAL COURSE  53 y.o. male with history of diabetes type 2, dyslipidemia, hypertension, CVA, peripheral arterial disease a who presented 6/21/2019 with a chronic foot wound which he has had for about 2 months.  He found to have left foot gangrene.  Patient was evaluated by limb preservation service and vascular surgery, as well as orthopedic surgery.  Patient undergone several surgeries:  left dorsalis pedis angioplasty, anterior tibial artery angioplasty and arthrectomy and posterior tibial artery angioplasty, left foot I&D with placement of wound vacuum.  Patient received wound care in the hospital and follow-up with his wound clinic is arranged.  Diabetes education provided for uncontrolled diabetes and patient was started on insulin.  Patient is to continue wound vacuum after discharge       Therefore, he is discharged in good and stable condition to home with close outpatient follow-up.    The patient met 2-midnight criteria for an inpatient stay at the time of discharge.    Discharge Date  7/2/2019    FOLLOW UP ITEMS POST DISCHARGE  Limb preservation service    DISCHARGE DIAGNOSES  Principal Problem:    Peripheral arterial disease (HCC) POA: Unknown  Active Problems:    Acute ischemic stroke (HCC) POA: Yes    Uncontrolled type 2 diabetes mellitus with hyperglycemia (HCC) POA: Yes    Hyperlipidemia POA: Yes    Wound infection POA: Yes  Resolved Problems:    * No resolved hospital problems. *      FOLLOW UP  Future Appointments  Date Time Provider Department Center   7/5/2019 2:00 PM BANDAR Ramos [facility] [address]   7/12/2019 8:00 AM Charles Chopra M.D. [place] [address]   8/14/2019 9:15 AM Jessie Morrison M.D. [facility] CHRISTOPHE Chatterjee M.D.  [address]  [id number]  [phone]      Follow-up appointment      MEDICATIONS ON DISCHARGE     Medication List      START taking these medications      Instructions   clopidogrel 75 MG Tabs  Commonly known as:  PLAVIX   Take 1 Tab by mouth every day.  Dose:  75 mg     insulin glargine 100 UNIT/ML Sopn injection  Commonly known as:  BASAGLAR URIAHIKPEN   Doctor's comments:  E11.65  Inject 22 Units as instructed every evening.  Dose:  22 Units     insulin lispro (Human) 100 UNIT/ML Sopn injection  Commonly known as:  HUMALOG   Doctor's comments:  70  - 150 mg/dL = 0 Units 151 - 200 mg/dL = 2 Units 201 - 250 mg/dL = 3 Units 251 - 300 mg/dL = 5 Units 301 - 350 mg/dL = 6 Units 351 - 400 mg/dL  = 8 Units Over 400 mg/dL  = 9 Units  Inject 2-9 Units as instructed 3 times a day before meals.  Dose:  2-9 Units     Insulin Pen Needle 32 G x 4 mm   Doctor's comments:  Per patient/formulary preference. ICD-10 code: E11.65 Uncontrolled type 2 Diabetes Mellitus  Use one pen needle in pen device to inject insulin 4 times a day        CONTINUE taking these medications      Instructions   aspirin 81 MG EC tablet   Take 1 Tab by mouth every day.  Dose:  81 mg     atorvastatin 40 MG Tabs  Commonly known as:  LIPITOR   Take 1 Tab by mouth every bedtime.  Dose:  40 mg     glipiZIDE 5 MG Tabs  Commonly known as:  GLUCOTROL   Take 5 mg by mouth 2 times a day.  Dose:  5 mg     metFORMIN  MG Tb24  Commonly known as:  GLUCOPHAGE XR   Take 500 mg by mouth every day.  Dose:  500 mg        STOP taking these medications    clarithromycin 500 MG Tabs  Commonly known as:  BIAXIN     riFAMPin 300 MG Caps  Commonly known as:  RIFADINE            Allergies  No Known Allergies    DIET  Orders Placed This Encounter   Procedures   • Diet Order Diabetic     Standing Status:   Standing     Number of Occurrences:   1     Order Specific Question:   Diet:     Answer:   Diabetic [3]     Order Specific Question:   Miscellaneous modifications:     Answer:   Vegetarian  [13]       ACTIVITY  As tolerated.  Weight bearing as tolerated    CONSULTATIONS  Orthopedic surgery  Vascular surgery  Diabetes education  Limb preservation services    PROCEDURES  PROCEDURES PERFORMED 6/24:  1.  Left foot irrigation and debridement, multiple compartments, including   anterior foot and toes.  2.  Left placement of skin substitute.  3.  Left placement of wound VAC.    Procedure(s):              Diagnostics   6/21  US guided access of the right common femoral artery  Introduction of catheter to aorta  Abdominal aortogram  Bilateral lower extremity angiogram  Selective catheterization of the left anterior tibial artery (additional 3rd order cath)  Selective catheterization of the left posterior tibial artery (additional 3rd order cath)   Selective catheterization of the left dorsalis pedis artery (additional 3rd order cath)  Selective catheterization of the left lateral tarsal artery (additional 3rd order cath)  Additional selective angiographic views x 8    LABORATORY  Lab Results   Component Value Date    SODIUM 137 06/30/2019    POTASSIUM 4.3 06/30/2019    CHLORIDE 102 06/30/2019    CO2 27 06/30/2019    GLUCOSE 207 (H) 06/30/2019    BUN 21 06/30/2019    CREATININE 0.75 06/30/2019    CREATININE 0.9 05/09/2007        Lab Results   Component Value Date    WBC 5.8 06/30/2019    HEMOGLOBIN 11.3 (L) 06/30/2019    HEMATOCRIT 36.6 (L) 06/30/2019    PLATELETCT 292 06/30/2019      IR-EXTREMITY ANGIOGRAM-UNILATERAL LEFT    (Results Pending)         Total time of the discharge process exceeds 37 minutes.

## 2019-07-04 NOTE — WOUND TEAM
"[facility]  Inpatient Services  Wound and Skin Care Progress Note    Admission Date:  6/21/2019   HPI, PMH, SH: Reviewed  Unit where seen by Wound Team: [id number]    WOUND FOLLOW UP/CONSULT RELATED TO:  Scheduled left foot NPWT dressing change    SUBJECTIVE:  \"It hurts a little more\"      Self Report / Pain Level:  Denies overall     OBJECTIVE:  Dressing intact and wife at bedside; Marcelino Rankin APN in to observe wound    WOUND TYPE, LOCATION, CHARACTERISTICS (Pressure ulcers: location, stage, POA or date identified)        Wound 06/22/19 LEFT FOOT (Active)   Wound Image        Site Assessment Red    Angelica-wound Assessment Maceration, intact    Margins attached    Wound Length (cm) 10 cm    Wound Width (cm) 5 cm    Wound Surface Area (cm^2) 50 cm^2    Tunneling 0 cm    Undermining 0 cm    Closure Secondary intention    Drainage Amount Small    Drainage Description Serosanguineous    Non-staged Wound Description Full thickness    Treatments Site care    Dressing Options NPWT    Dressing Cleansing/Solutions Wound cleanser    Dressing Changed Changed    Dressing Status Clean;Dry;Intact    Dressing Change Frequency Tuesday, Thursday, Saturday    NEXT Dressing Change  07/05/19    NEXT Weekly Photo (Inpatient Only) 07/06/19    WOUND NURSE ONLY - Odor none    WOUND NURSE ONLY - Pulses PT;1+;Left    WOUND NURSE ONLY - Exposed Structures none    WOUND NURSE ONLY - Tissue Type and Percentage red and yellow    WOUND NURSE ONLY - Time Spent with Patient (mins) 60        Wound 06/29/19 Diabetic Ulcer Toe (Comment which one) 2nd and 3rd toes (Active)   Site Assessment Brown;Dry    Angelica-wound Assessment Dry    Margins attached edges    Tunneling 0 cm    Undermining 0 cm    Closure Secondary intention    Drainage Amount None    Treatments Site care;Cleansed    Cleansing Approved Wound Cleanser    Dressing Options Honey colloid, dry gauze    NEXT Weekly Photo (Inpatient Only) 07/06/19    WOUND NURSE ONLY - Odor none  "   WOUND NURSE ONLY - Exposed Structures None    WOUND NURSE ONLY - Tissue Type and Percentage 100% dry brown           Vascular:  Assessed by LPS; see her note    Lab Values:    WBC:       WBC   Date/Time Value Ref Range Status   06/30/2019 03:13 AM 5.8 4.8 - 10.8 K/uL Final     AIC:      Lab Results   Component Value Date/Time    HBA1C 8.9 (H) 06/28/2019 07:19 AM         Culture:   NA    INTERVENTIONS BY WOUND TEAM:  Removed old dressings and cleansed wound with wound cleanser covering wound base with adaptic due to biologic in place.  Benzoin and drape to irma wound skin and covered wound with black foam.  Secured with drape and resumed NPWT at 125mmHg continuously.  Instructed patient and wife on home VAC and how to charge, replace cannister and fix a leak with drape.  Honey colloid and dry gauze to left toe wounds per LPS request.  Total black foam= 2 (trac pad on black foam button.    Interdisciplinary consultation:  RN, patient, Marcelino CHUNG    EVALUATION:   Wound seems clean with some areas of darkened red; LPS to follow; wound is guarded condition      Factors affecting wound healing:  PVD, DM  Goals:  Steady decrease in wound area and depth weekly     NURSING PLAN OF CARE ORDERS (X):    Dressing changes: See Dressing Care orders:   X  Skin care: See Skin Care orders:   Rectal tube care: See Rectal Tube Care orders:   Other orders:    WOUND TEAM PLAN OF CARE (X):   NPWT change 3 x week:      X  Dressing changes by wound team:       Follow up as needed:       Other (explain):    Anticipated discharge plans (X):  SNF:           Home Care:           Outpatient Wound Center:   X Friday appointment with LPS for VAC dressing change       Self Care:            Other:

## 2019-07-05 ENCOUNTER — OFFICE VISIT (OUTPATIENT)
Dept: WOUND CARE | Facility: MEDICAL CENTER | Age: 54
End: 2019-07-05
Attending: PHYSICIAN ASSISTANT
Payer: COMMERCIAL

## 2019-07-05 VITALS
DIASTOLIC BLOOD PRESSURE: 78 MMHG | TEMPERATURE: 97.3 F | RESPIRATION RATE: 20 BRPM | SYSTOLIC BLOOD PRESSURE: 117 MMHG | OXYGEN SATURATION: 98 % | HEART RATE: 103 BPM

## 2019-07-05 DIAGNOSIS — E11.42 DIABETIC POLYNEUROPATHY ASSOCIATED WITH TYPE 2 DIABETES MELLITUS (HCC): ICD-10-CM

## 2019-07-05 DIAGNOSIS — L97.428 DIABETIC ULCER OF LEFT MIDFOOT ASSOCIATED WITH TYPE 2 DIABETES MELLITUS, WITH OTHER ULCER SEVERITY (HCC): ICD-10-CM

## 2019-07-05 DIAGNOSIS — E11.621 DIABETIC ULCER OF LEFT MIDFOOT ASSOCIATED WITH TYPE 2 DIABETES MELLITUS, WITH OTHER ULCER SEVERITY (HCC): ICD-10-CM

## 2019-07-05 DIAGNOSIS — L97.523 DIABETIC ULCER OF TOE OF LEFT FOOT ASSOCIATED WITH TYPE 2 DIABETES MELLITUS, WITH NECROSIS OF MUSCLE (HCC): ICD-10-CM

## 2019-07-05 DIAGNOSIS — T14.8XXA WOUND INFECTION: ICD-10-CM

## 2019-07-05 DIAGNOSIS — L89.896 PRESSURE INJURY OF DEEP TISSUE OF LEFT FOOT: ICD-10-CM

## 2019-07-05 DIAGNOSIS — E11.621 DIABETIC ULCER OF TOE OF LEFT FOOT ASSOCIATED WITH TYPE 2 DIABETES MELLITUS, WITH NECROSIS OF MUSCLE (HCC): ICD-10-CM

## 2019-07-05 DIAGNOSIS — E11.65 UNCONTROLLED TYPE 2 DIABETES MELLITUS WITH HYPERGLYCEMIA (HCC): ICD-10-CM

## 2019-07-05 DIAGNOSIS — L08.9 WOUND INFECTION: ICD-10-CM

## 2019-07-05 DIAGNOSIS — I73.9 PERIPHERAL ARTERIAL DISEASE (HCC): ICD-10-CM

## 2019-07-05 PROCEDURE — 99213 OFFICE O/P EST LOW 20 MIN: CPT | Performed by: NURSE PRACTITIONER

## 2019-07-05 PROCEDURE — 99214 OFFICE O/P EST MOD 30 MIN: CPT

## 2019-07-05 PROCEDURE — 97605 NEG PRS WND THER DME<=50SQCM: CPT | Performed by: NURSE PRACTITIONER

## 2019-07-05 ASSESSMENT — ENCOUNTER SYMPTOMS
DEPRESSION: 0
NERVOUS/ANXIOUS: 0
FEVER: 0
CONSTIPATION: 0
CHILLS: 0
COUGH: 0
NAUSEA: 0
VOMITING: 0
DIARRHEA: 0
SENSORY CHANGE: 1

## 2019-07-05 NOTE — PROGRESS NOTES
Provider Encounter- Diabetic Foot Ulcer      HISTORY OF PRESENT ILLNESS               START OF CARE IN CLINIC: 2019 after hospitalization, (initial start date 19)               REFERRING PROVIDER: Judy Henry MD               WOUND- Diabetic foot ulcer complicated by PVD              LOCATION: Left dorsomedial foot distal                                   Left dorsomedial foot proximal              Left 2nd and 3rd toes     Left lateral fifth MTH DTI          left lateral proximal fifth metatarsal DTI                WOUND HISTORY: Pt reports he was in [place], wore leather shoes that were too tight and developed blisters in 2019. He cleaned ulcers with hydrogen peroxide but the ulcers worsened. He went to urgent care, was referred to wound clinic in May 9, 2019 and arterial studies were ordered that day. He did not complete studies until . Once studies were completed, he was scheduled to be seen by Dr. Graham at the clinic to discuss results however pt went out of town and was not seen until . After seeing Dr. Graham, direct admission was arranged for surgery but pt had difficulty grasping the seriousness of his condition. He did not present to hospital until 19.    He underwent left DP angioplasty, left AT angioplasty and atherectomy, left PT angioplasty on 2019 by Dr. Chatterjee.  He then had a left foot I&D with application of epi-fix and wound VAC by Dr. Chopra on 2019.  He was seen by limb preservation service in the hospital and discharged with wound care follow-up.                PERTINENT PMH: DMII, PVD               [id number].  See below for results              VASCULAR STUDIES:     2019.  See below for results                         LAST  WOUND CULTURE DATE :  None found in chart                    OFFLOADING: Offloading shoe    DIABETES HX: Diagnosed with type 2 diabetes 15 years ago, and is currently managing with oral medication.  Checks blood sugars 1-2  times per day and reports that these typically run around 170-180.  Has had previous diabetes education.  He does have some numbness in his feet.  Usually wears slippers or regular shoes. Does not check his feet routinely.   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  TOBACCO USE: He is never used tobacco products      7/5: Initial evaluation and initial provider visit after being in the hospital from 6/21-7/2/19.  Wife reports that when patient has ulcers to his legs they are always dry looking, look similar to how the ulcers look now and believes it is related to his ethnicity.  Education provided about the difference between wounds with adequate blood flow and without adequate blood flow.  Patient and wife are requesting that everything be done in order to save his limb.  He is wearing a sandal instead of his offloading shoe to the left foot.  Blood sugar today was 130.  Authorization placed for acell.  He has already been denied by insurance for epifix.  Continue with wound VAC therapy to promote wound healing.  Has LPS follow-up appointment on 7/12/2019    RESULTS:   Most recent A1c:   8.9% on 6/20/2019    X-ray of left foot on 5/17/2019:  Soft tissue ulceration along the first ray has no radiographic evidence of bony destruction to indicate osteomyelitis. If there is high clinical concern, MRI could be performed as is much more sensitive    Mild first metatarsal-phalangeal joint osteoarthritis    Vascular calcifications    Fifth metatarsal-phalangeal centimeters soft tissue swelling is nonspecific, early gout or rheumatoid arthritis could be considered      Arterial studies on 5/24/2019  ALYSSA's           RIGHT     Waveform            Systolic BPs (mmHg)                              143           Brachial   Triphasic                                Common Femoral   Monophasic                 109           Posterior Tibial   Monophasic                 100           Dorsalis Pedis                                             Peroneal                              0.76          ALYSSA                                            TBI                          LEFT   Waveform        Systolic BPs (mmHg)                              142           Brachial   Triphasic                                Common Femoral   Monophasic                 124           Posterior Tibial   Monophasic                 113           Dorsalis Pedis                                            Peroneal                              0.87          ALYSSA                                            TBI  Conclusions   1.  Apparent tibial artery stenosis and possible occlusion bilaterally.    2.  Toe pressure indicate severe arteriial occlusive disease and suggest    the possibility of artifically elevated ankle brachial indices.    Arterial duplex imaging  CONCLUSIONS   1. Apparent normal arterial perfusion to the bilateral popliteal arteries.   2.  Tandem lesions in the right posterior tibial artery with short segment    occlusion and reconsituted flow in the distal artery.    3.  Distal occlusion of both the anterior tibial and peroneal artery.     4.  Left posterior tibial and peroneal artery occlusion with reconstitution    of the distal vessel.     5.  The left anterior tibial artery has tandem significant stenoses.    PAST MEDICAL HISTORY:   Past Medical History:   Diagnosis Date   • Diabetes (HCC)      PAST SURGICAL HISTORY:   Past Surgical History:   Procedure Laterality Date   • IRRIGATION & DEBRIDEMENT ORTHO Left 6/24/2019    Procedure: IRRIGATION AND DEBRIDEMENT, WOUND-FOOT, BIOLOGIC PLACEMENT, WOUND VAC PLACEMENT;  Surgeon: Charles Chopra M.D.;  Location: SUR[facility];  Service: Orthopedics        MEDICATIONS:   Current Outpatient Prescriptions   Medication   • insulin glargine (BASAGLAR KWIKPEN) 100 UNIT/ML Solution Pen-injector injection   • Insulin Pen Needle 32 G x 4 mm   • glipiZIDE (GLUCOTROL) 5 MG Tab   • aspirin 81 MG EC tablet   •  atorvastatin (LIPITOR) 40 MG Tab     No current facility-administered medications for this visit.        ALLERGIES:  No Known Allergies      SOCIAL HISTORY:   Social History     Social History   • Marital status:      Spouse name: N/A   • Number of children: N/A   • Years of education: N/A     Social History Main Topics   • Smoking status: Never Smoker   • Smokeless tobacco: Never Used   • Alcohol use No   • Drug use: No   • Sexual activity: Not on file     Other Topics Concern   • Not on file     Social History Narrative   • No narrative on file       FAMILY HISTORY: No family history on file.     REVIEW OF SYSTEMS:   Review of Systems   Constitutional: Negative for chills and fever.   Respiratory: Negative for cough.    Cardiovascular: Negative for chest pain and claudication.        Less pain to left lower leg since vascular procedure   Gastrointestinal: Negative for constipation, diarrhea, nausea and vomiting.   Genitourinary: Negative for dysuria.   Musculoskeletal: Negative for joint pain.   Skin: Negative for rash.        Wounds to left dorsal foot since early May 2019, started as large blisters.   Neurological: Positive for sensory change.        Numbness in both feet   Psychiatric/Behavioral: Negative for depression. The patient is not nervous/anxious.        PHYSICAL EXAMINATION:   /78   Pulse (!) 103   Temp 36.3 °C (97.3 °F) (Temporal)   Resp 20   SpO2 98%     Physical Exam   Constitutional: He is oriented to person, place, and time and well-developed, well-nourished, and in no distress.   HENT:   Head: Normocephalic.   Eyes: Pupils are equal, round, and reactive to light.   Neck: Normal range of motion. Neck supple. No JVD present.   Cardiovascular: Normal rate and regular rhythm.    Palpable femoral and popliteal arteries bilaterally.   Unable to palpate pedal pulses in left foot  With Doppler biphasic tones noted to DP and PT   Pulmonary/Chest: Effort normal. No respiratory distress.    Abdominal: Soft. He exhibits no distension.   Musculoskeletal: He exhibits edema (LLE greater than RLE).   Neurological: He is alert and oriented to person, place, and time.   Both feet insensate to light touch   Skin: Skin is warm.   Left dorsal distal first MTH ulcer with residual epi-fix product and dark purple discoloration connecting to proximal first metatarsal ulcer  Left second toe: Wet eschar  Left third dorsal toe: Dry eschar  Left lateral fifth MTH DTI: Intact skin dark purple discoloration  Left lateral proximal fifth metatarsal DTI: Intact skin dark purple discoloration, painful with palpation  Refer to wound flowsheet and photos   Psychiatric: Mood, memory, affect and judgment normal.       WOUND ASSESSMENT            Wound photos  Left dorsal medial foot, proximal and distal wound.  No debridement today              Left second toe. No debridement today          Left 3rd toe.  No debridement today          Left lateral fifth MTH and left lateral proximal fifth metatarsal deep tissue injury-no debridement              Procedure: Wound care completed by  Sara Rosenberg RN  wound VAC applied to left dorsal medial foot ulcers.  Residual epi fix remains in place.  Adaptic placed over epi fix prior to black foam  Honeycolloid, Nonadhesive foam, Hypafix tape to left second and third toe ulcers    DTI injuries left open to air        PATIENT EDUCATION  - Importance of tight glucose control for wound healing   - Implications of loss of protective sensation (LOPS) discussed with patient- including increased risk for amputation.  - Advised to check feet at least daily, moisturize feet, and to always wear protective foot wear.   -  Importance of offloading foot to assist with wound healing  - Advised pt not to trim nails or calluses, seek foot/nail care from podiatrist or certified foot/nail nurse  - Importance of adequate nutrition for wound healing  - Increase protein intake (unless contraindicated by  renal status)    ASSESSMENT AND PLAN:     1. Diabetic ulcer of left midfoot associated with type 2 diabetes mellitus, with other ulcer severity (HCC)  Comment: Necrotic wounds x2 to dorsum of left foot.  Present since 3/2019, started as blisters.  S/P revascularization procedure on 6/21 and left foot I&D, epi fix and wound VAC application by Dr. Chopra on 6/24/2019 7/5: Residual epi-fix product remains in place.  No excisional debridement performed today because of this.  Ischemic changes between both ulcers.  Limb preservation status highly guarded.  Patient at risk for limb amputation.  Patient and family requesting that everything be done prior to amputation.  Authorization placed for ACell application.  ACell medically necessary for wound healing.  Patient has been denied by insurance for epi-fix.  -Wound care: Adaptic over residual product, wound VAC therapy 125 mmHg continuous.  Patient to return to clinic 3 times per week for VAC change.  -Has follow-up LPS appointment on 7/12/2019.    2. Uncontrolled type 2 diabetes mellitus with hyperglycemia ([facility])  Comments: Most recent A1c 8.9 % on 6/28/2019 7/5: Blood sugar today 130.  Seen by diabetes educator and dietitian in hospital.  Patient is vegetarian.  Discussed the importance of adequate sources of protein for wound healing  -Adverse effects of hyperglycemia on wound healing and general health discussed with patient  -He is encouraged to keep his blood sugars below 150 in order to optimize chances for wound healing.    3.  PAD  Comments: Arterial studies were ordered on patient's initial clinic visit, 5/9/2019.  Not completed until 5/24/2019.  S/P left DP angioplasty, left AT angioplasty and atherectomy, left PT angioplasty on 6/21/2019 by Dr. Chatterjee.    7/5: DP and PT pulses are audible with Doppler.  Check pulses with Doppler at each visit.      4. Diabetic polyneuropathy associated with type 2 diabetes mellitus (HCC)    7/5: Complicating factor.   Patient to wear offloading shoe to left foot to provide protection and offloading surface.  Reiterated importance to patient and wife.  Verbalized understanding.    5. Wound infection  7/5: Currently not on antibiotics.  Will monitor for signs and symptoms of infection.    6.  Diabetic ulcer of toe of left foot associated with type 2 diabetes mellitus, with necrosis of muscle  Developed ulcer to left second and third toe   7/5: wet eschar to L 2nd and dry eschar to L 3rd toe.  Continue with honeycolloid, Nonadhesive foam, Hypafix tape.  Honey colloid to autolyticly debride nonviable tissue.    7.  Pressure injury of deep tissue of left foot  Deep tissue injury to lateral left fifth MTH and to left proximal fifth metatarsal.    7/5: Skin intact, pain with palpation.  Patient to wear offloading shoe to alleviate pressure to this area.  Patient to float heel and foot with pillow when at home. Continue to monitor.     Please note that this dictation was created using voice recognition software. I have worked with technical experts from [facility] to optimize the interface.  I have made every reasonable attempt to correct obvious errors, but there may be errors of grammar and possibly content that I did not discover before finalizing the note.

## 2019-07-05 NOTE — PATIENT INSTRUCTIONS
Wound VAC at 125mmHg continuous or intermittent with black foam. Dressing will be changed every MWF at the wound clinic.  If you are having issues with your wound VAC, please consider patching leaks, changing the canister, or calling [phone] for troubleshooting. If the wound VAC has been off or un-operational for over 2 hours, call wound care center to inform them and remove all dressings including black foam and replace with normal saline damp gauze.     Should you experience any significant changes in your wound(s), such as infection (redness, swelling, localized heat, increased pain, fever > 101 F, chills) or have any questions regarding your home care instructions, please contact the wound center at [phone]. If after hours, contact your primary care physician or go to the hospital emergency room.   Keep dressing clean, dry and cover when bathing. Only change dressing if it's over saturated, soiled or falls off.

## 2019-07-07 PROBLEM — L97.509 DIABETIC FOOT ULCER (HCC): Status: ACTIVE | Noted: 2019-07-07

## 2019-07-07 PROBLEM — E11.42 DIABETIC POLYNEUROPATHY ASSOCIATED WITH TYPE 2 DIABETES MELLITUS (HCC): Status: ACTIVE | Noted: 2019-07-07

## 2019-07-07 PROBLEM — E11.621 DIABETIC FOOT ULCER (HCC): Status: ACTIVE | Noted: 2019-07-07

## 2019-07-07 ASSESSMENT — ENCOUNTER SYMPTOMS: CLAUDICATION: 0

## 2019-07-08 ENCOUNTER — NON-PROVIDER VISIT (OUTPATIENT)
Dept: WOUND CARE | Facility: MEDICAL CENTER | Age: 54
End: 2019-07-08
Attending: PHYSICIAN ASSISTANT
Payer: COMMERCIAL

## 2019-07-08 PROCEDURE — 97597 DBRDMT OPN WND 1ST 20 CM/<: CPT

## 2019-07-08 NOTE — DOCUMENTATION QUERY
[facility]                                                                       Query Response Note      PATIENT:               TRELL CASTLE  ACCT #:                  [id number]  MRN:                     [id number]  :                      1965  ADMIT DATE:       2019 6:13 AM  DISCH DATE:        2019 2:02 PM  RESPONDING  PROVIDER #:        [id number]           QUERY TEXT:    Excisional debridement, down to the level of bone, has been documented in the OR Report.  Please document which bone was excised.       NOTE:  If an appropriate response is not listed below, please respond with a new note.    The patient's Clinical Indicators include:  Per OR Report   -sharp excisional debridement was performed of necrotic skin down to the level of bone over his foot.      Treatment:   Left foot irrigation and debridement, multiple compartments, including anterior foot and toes     Risk Factors:   Left foot gangrene, DM2, & peripheral vascular disease    Query created by: Leti Head on 2019 1:17 PM    RESPONSE TEXT:    Other- No bone was excised. debridement went down to the level of bone          Electronically signed by:  JAIME STEIN MD 2019 7:48 AM

## 2019-07-08 NOTE — PROCEDURES
CSWD with scissors & forceps to lifting non-viable tissue at edges of left 2nd toe wound to reveal intact tissue beneath.  Dr. Graham & Marcelino Rankin, APRN in to view wound with soft painful area at plantar aspect & plan for patient to see LPS on Friday.

## 2019-07-08 NOTE — PATIENT INSTRUCTIONS
Wound VAC at 125mmHg continuous or intermittent with 1 foam. Dressing will be changed every MWF at the wound clinic or with your home health nurse.  If you are having issues with your wound VAC, please consider patching leaks, changing the canister, or calling [phone] for troubleshooting. If the wound VAC has been off or un-operational for over 2 hours, call wound care center to inform them and remove all dressings including black foam and replace with normal saline damp gauze.     Should you experience any significant changes in your wound(s), such as infection (redness, swelling, localized heat, increased pain, fever > 101 F, chills) or have any questions regarding your home care instructions, please contact the wound center at [phone]. If after hours, contact your primary care physician or go to the hospital emergency room.   Keep dressing clean, dry and cover when bathing. Only change dressing if it's over saturated, soiled or falls off.

## 2019-07-09 ENCOUNTER — APPOINTMENT (OUTPATIENT)
Dept: WOUND CARE | Facility: MEDICAL CENTER | Age: 54
End: 2019-07-09
Attending: PHYSICIAN ASSISTANT
Payer: COMMERCIAL

## 2019-07-10 ENCOUNTER — NON-PROVIDER VISIT (OUTPATIENT)
Dept: WOUND CARE | Facility: MEDICAL CENTER | Age: 54
End: 2019-07-10
Attending: PHYSICIAN ASSISTANT
Payer: COMMERCIAL

## 2019-07-10 PROCEDURE — 97605 NEG PRS WND THER DME<=50SQCM: CPT

## 2019-07-11 NOTE — PROCEDURES
NPWT resumed at 150 mmHg with no leaks noted. Non selective with NS and gauze to all wound beds to remove non viable tissue.

## 2019-07-11 NOTE — PATIENT INSTRUCTIONS
Should you experience any significant changes in your wound(s) such as infection (redness, swelling, localized heat, increased pain, fever >101 F, chills) or have any questions regarding your home care instructions, please contact the wound center [phone]. If after hours, contact your primary care physician or go the hospital emergency room.  Keep dressing clean and dry and cover while bathing. Only change dressing if over saturated, soiled or its falling off.

## 2019-07-12 ENCOUNTER — OFFICE VISIT (OUTPATIENT)
Dept: WOUND CARE | Facility: MEDICAL CENTER | Age: 54
End: 2019-07-12
Attending: PHYSICIAN ASSISTANT
Payer: COMMERCIAL

## 2019-07-12 DIAGNOSIS — E11.621 DIABETIC ULCER OF TOE OF LEFT FOOT ASSOCIATED WITH TYPE 2 DIABETES MELLITUS, WITH NECROSIS OF MUSCLE (HCC): ICD-10-CM

## 2019-07-12 DIAGNOSIS — I73.9 PERIPHERAL ARTERIAL DISEASE (HCC): ICD-10-CM

## 2019-07-12 DIAGNOSIS — L97.523 DIABETIC ULCER OF TOE OF LEFT FOOT ASSOCIATED WITH TYPE 2 DIABETES MELLITUS, WITH NECROSIS OF MUSCLE (HCC): ICD-10-CM

## 2019-07-12 DIAGNOSIS — L97.428 DIABETIC ULCER OF LEFT MIDFOOT ASSOCIATED WITH TYPE 2 DIABETES MELLITUS, WITH OTHER ULCER SEVERITY (HCC): ICD-10-CM

## 2019-07-12 DIAGNOSIS — E11.621 DIABETIC ULCER OF LEFT MIDFOOT ASSOCIATED WITH TYPE 2 DIABETES MELLITUS, WITH OTHER ULCER SEVERITY (HCC): ICD-10-CM

## 2019-07-12 DIAGNOSIS — L89.896 PRESSURE INJURY OF DEEP TISSUE OF LEFT FOOT: ICD-10-CM

## 2019-07-12 DIAGNOSIS — E11.65 UNCONTROLLED TYPE 2 DIABETES MELLITUS WITH HYPERGLYCEMIA (HCC): ICD-10-CM

## 2019-07-12 PROCEDURE — C5276 LOW COST SKIN SUBSTITUTE APP: HCPCS

## 2019-07-12 PROCEDURE — 15275 SKIN SUB GRAFT FACE/NK/HF/G: CPT | Performed by: NURSE PRACTITIONER

## 2019-07-12 PROCEDURE — C5275 LOW COST SKIN SUBSTITUTE APP: HCPCS

## 2019-07-12 PROCEDURE — 15276 SKIN SUB GRAFT F/N/HF/G ADDL: CPT | Performed by: NURSE PRACTITIONER

## 2019-07-12 NOTE — WOUND TEAM
Pt seen during ortho rounds with LPS team for Left dorsomedial foot and left 2nd ray and 3rd ray eschar wounds.  Measurements(cm): See photos below. Following physician assessment and debridement of wound subcutaneous <20cm2. APRN applied Acell 3x3.5 and 3.x7 sheet to L dorsomedial foot wound bed. Eschar in middle of wound bed covered with honey gel. Entire wound covered with adaptic secured with steri strips. Then NPWT at 150 mmHg with no leaks noted. RN evaluated patient's wounds on toes and dressed with Medihoney gel covered with non-adhesive foam and secured with hypafix tape.

## 2019-07-12 NOTE — PROGRESS NOTES
LIMB PRESERVATION SERVICE ROUNDS    Patient seen in collaboration with interdisciplinary team during LPS rounds in wound clinic for left medial dorsal foot, left second toe, left third toe, left lateral foot ulcers.  Ulcer started in March 2019 as blisters after wearing ill fitting shoes.  He started wound care in May 2019 and arterial studies were ordered that day.    He did not complete studies until 5/24. Once studies were completed, he was scheduled to be seen by Dr. Graham at the clinic to discuss results however pt went out of town and was not seen until 6/17. After seeing Dr. Graham, direct admission was arranged for surgery but pt had difficulty grasping the seriousness of his condition. He did not present to hospital until 6/21/19.    He underwent left DP angioplasty, left AT angioplasty and atherectomy, left PT angioplasty on 6/21/2019 by Dr. Chatterjee.  He then had a left foot I&D with application of epi-fix and wound VAC by Dr. Chopra on 6/24/2019.  He was seen by limb preservation service in the hospital and discharged with wound care follow-up.      Patient states blood sugar was 104.  He reports that he is slipping in his offloading shoe.  He feels that the swelling has decreased to his foot.  Patient reports that he has less pain to his first MTH ulcer.  Denies fevers, chills, nausea, vomiting.        RESULTS:  A1c 8.9% on 6/20/2019      OBJECTIVE FINDINGS:   DP and PT pulses palpable    Wound:  Eschar in between distal and proximal medial dorsal foot ulcers.  Some red granular buds to the lateral aspect of the distal and proximal ulcers to dorsum foot.  Slough/residual product/adipose to medial aspect with UM and tenderness to 1st MTH  Tissue is dry to distal aspect of distal dorsal ulcer  Dry black eschar to left second and left third toe  DTI to left lateral fifth MTH and left proximal lateral fifth metatarsal.  He has a new DTI to left lateral fifth toe.  DTI's are dry, dark gray discoloration.   Skin is intact.  Less pain with palpation to the left proximal lateral fifth metatarsal      PROCEDURE   Using curette, scalpel excised nonviable tissue to dorsal foot ulcers. Total area debrided <20cm2 to muscle tissue level. Unable to debride all of yellow tissue d/t pain tolerance. Crosshatched with scalpel eschar in between 2 dorsal ulcers.   Total area of wounds cytal applied to: 48.88cm2  Using sterile procedure moistened with NS and applied 3x 7cm cytal sheet followed by 3x 3.5cm cytal sheet into UM area. No waste of either cytal sheet.   Wound care completed by  Sara Rosenbreg RN    cytal 3 x 3.5cm  LOT: [id number]  REF: [id number]  SN: [id number]  No waste    cytal 3 x 7cm  LOT: [id number]  REF: [id number]  [id number]  No waste        PLAN:   Wound Care: Continue at [facility] 2 times a week for ACell application and VAC change.  Imaging: No further imaging at this  Offloading: Offloading shoe to be worn when ambulating.  Offloading shoe to be adjusted by ability to day.  Antibiotics: No antibiotics at this time  Surgery: Further surgical options discussed with patient by Dr. Sepulveda and Dr. Staton including TMA and possibly BKA  Referral: Ability      LPS Follow up: 8/2/2019      Please note that this dictation was created using voice recognition software. I have  worked with technical experts from [facility]  [employer]ing to optimize the interface.  I have made every reasonable attempt to correct obvious errors, but there may be errors of grammar and possibly content that I did not discover before finalizing the note.

## 2019-07-16 ENCOUNTER — NON-PROVIDER VISIT (OUTPATIENT)
Dept: WOUND CARE | Facility: MEDICAL CENTER | Age: 54
End: 2019-07-16
Attending: PHYSICIAN ASSISTANT
Payer: COMMERCIAL

## 2019-07-16 PROCEDURE — 97597 DBRDMT OPN WND 1ST 20 CM/<: CPT

## 2019-07-17 NOTE — PROCEDURES
CSWD with scissors and forceps to remove <20 cm2 peeling callus from dorsal and plantar surfaces of foot.

## 2019-07-19 ENCOUNTER — HOSPITAL ENCOUNTER (OUTPATIENT)
Facility: MEDICAL CENTER | Age: 54
End: 2019-07-19
Attending: NURSE PRACTITIONER
Payer: COMMERCIAL

## 2019-07-19 ENCOUNTER — OFFICE VISIT (OUTPATIENT)
Dept: WOUND CARE | Facility: MEDICAL CENTER | Age: 54
End: 2019-07-19
Attending: PHYSICIAN ASSISTANT
Payer: COMMERCIAL

## 2019-07-19 VITALS
HEART RATE: 100 BPM | OXYGEN SATURATION: 93 % | SYSTOLIC BLOOD PRESSURE: 126 MMHG | RESPIRATION RATE: 18 BRPM | TEMPERATURE: 97.7 F | DIASTOLIC BLOOD PRESSURE: 84 MMHG

## 2019-07-19 DIAGNOSIS — L97.523 DIABETIC ULCER OF TOE OF LEFT FOOT ASSOCIATED WITH TYPE 2 DIABETES MELLITUS, WITH NECROSIS OF MUSCLE (HCC): ICD-10-CM

## 2019-07-19 DIAGNOSIS — T14.8XXA WOUND INFECTION: ICD-10-CM

## 2019-07-19 DIAGNOSIS — I73.9 PERIPHERAL ARTERIAL DISEASE (HCC): ICD-10-CM

## 2019-07-19 DIAGNOSIS — L08.9 WOUND INFECTION: ICD-10-CM

## 2019-07-19 DIAGNOSIS — E11.65 UNCONTROLLED TYPE 2 DIABETES MELLITUS WITH HYPERGLYCEMIA (HCC): ICD-10-CM

## 2019-07-19 DIAGNOSIS — L97.428 DIABETIC ULCER OF LEFT MIDFOOT ASSOCIATED WITH TYPE 2 DIABETES MELLITUS, WITH OTHER ULCER SEVERITY (HCC): ICD-10-CM

## 2019-07-19 DIAGNOSIS — L89.896 PRESSURE INJURY OF DEEP TISSUE OF LEFT FOOT: ICD-10-CM

## 2019-07-19 DIAGNOSIS — E11.621 DIABETIC ULCER OF LEFT MIDFOOT ASSOCIATED WITH TYPE 2 DIABETES MELLITUS, WITH OTHER ULCER SEVERITY (HCC): ICD-10-CM

## 2019-07-19 DIAGNOSIS — E11.42 DIABETIC POLYNEUROPATHY ASSOCIATED WITH TYPE 2 DIABETES MELLITUS (HCC): ICD-10-CM

## 2019-07-19 DIAGNOSIS — E11.621 DIABETIC ULCER OF TOE OF LEFT FOOT ASSOCIATED WITH TYPE 2 DIABETES MELLITUS, WITH NECROSIS OF MUSCLE (HCC): ICD-10-CM

## 2019-07-19 LAB
FORWARD REASON: SPWHY: NORMAL
FORWARDED TO LAB: SPWHR: NORMAL
SPECIMEN SENT: SPWT1: NORMAL

## 2019-07-19 PROCEDURE — 11043 DBRDMT MUSC&/FSCA 1ST 20/<: CPT | Performed by: NURSE PRACTITIONER

## 2019-07-19 PROCEDURE — 11043 DBRDMT MUSC&/FSCA 1ST 20/<: CPT

## 2019-07-19 ASSESSMENT — ENCOUNTER SYMPTOMS
COUGH: 0
CONSTIPATION: 0
SENSORY CHANGE: 1
DIARRHEA: 0
FEVER: 0
NAUSEA: 0
CLAUDICATION: 0
NERVOUS/ANXIOUS: 0
VOMITING: 0
DEPRESSION: 0
CHILLS: 0

## 2019-07-19 NOTE — PATIENT INSTRUCTIONS
Keep dressing clean and dry and cover while bathing. Only change dressing if over saturated, soiled or its falling off.     Should you experience any significant changes in your wound(s) such as infection (redness, swelling, localized heat, increased pain, fever >101 F, chills) or have any questions regarding your home care instructions, please contact the wound center [phone]. If after hours, contact your primary care physician or go the hospital emergency room.

## 2019-07-19 NOTE — PROGRESS NOTES
Provider Encounter- Diabetic Foot Ulcer      HISTORY OF PRESENT ILLNESS               START OF CARE IN CLINIC: 2019 after hospitalization, (initial start date 19)               REFERRING PROVIDER: Judy Henry MD               WOUND- Diabetic foot ulcer complicated by PVD              LOCATION: Left dorsomedial foot distal                                   Left dorsomedial foot proximal              Left 2nd and 3rd toes     Left lateral fifth MTH DTI          left lateral proximal fifth metatarsal DTI                WOUND HISTORY: Pt reports he was in [place], wore leather shoes that were too tight and developed blisters in 2019. He cleaned ulcers with hydrogen peroxide but the ulcers worsened. He went to urgent care, was referred to wound clinic in May 9, 2019 and arterial studies were ordered that day. He did not complete studies until . Once studies were completed, he was scheduled to be seen by Dr. Graham at the clinic to discuss results however pt went out of town and was not seen until . After seeing Dr. Graham, direct admission was arranged for surgery but pt had difficulty grasping the seriousness of his condition. He did not present to hospital until 19.    He underwent left DP angioplasty, left AT angioplasty and atherectomy, left PT angioplasty on 2019 by Dr. Chatterjee.  He then had a left foot I&D with application of epi-fix and wound VAC by Dr. Chopra on 2019.  He was seen by limb preservation service in the hospital and discharged with wound care follow-up.                PERTINENT PMH: DMII, PVD               IMAGIN2019.  See below for results              VASCULAR STUDIES:     2019.  See below for results                         LAST  WOUND CULTURE DATE :  2019-culture sent                   OFFLOADING: Offloading shoe    DIABETES HX: Diagnosed with type 2 diabetes 15 years ago, and is currently managing with oral medication.  Checks blood sugars  1-2 times per day and reports that these typically run around 170-180.  Has had previous diabetes education.  He does have some numbness in his feet.  Usually wears slippers or regular shoes. Does not check his feet routinely.   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  TOBACCO USE: He is never used tobacco products      7/5: Initial evaluation and initial provider visit after being in the hospital from 6/21-7/2/19.  Wife reports that when patient has ulcers to his legs they are always dry looking, look similar to how the ulcers look now and believes it is related to his ethnicity.  Education provided about the difference between wounds with adequate blood flow and without adequate blood flow.  Patient and wife are requesting that everything be done in order to save his limb.  He is wearing a sandal instead of his offloading shoe to the left foot.  Blood sugar today was 130.  Authorization placed for acell.  He has already been denied by insurance for epifix.  Continue with wound VAC therapy to promote wound healing.  Has LPS follow-up appointment on 7/12/2019 7/19/2019 : Clinic visit with YAMILKA Penaloza.  Increased slough and eschar in wound, not clean enough for VAC therapy.  VAC put on hold, Medihoney used for autolytic debridement.    RESULTS:   Most recent A1c:   8.9% on 6/20/2019    X-ray of left foot on 5/17/2019:  Soft tissue ulceration along the first ray has no radiographic evidence of bony destruction to indicate osteomyelitis. If there is high clinical concern, MRI could be performed as is much more sensitive    Mild first metatarsal-phalangeal joint osteoarthritis    Vascular calcifications    Fifth metatarsal-phalangeal centimeters soft tissue swelling is nonspecific, early gout or rheumatoid arthritis could be considered      Arterial studies on 5/24/2019  ALYSSA's           RIGHT     Waveform            Systolic BPs (mmHg)                              143           Brachial   Triphasic                                 Common Femoral   Monophasic                 109           Posterior Tibial   Monophasic                 100           Dorsalis Pedis                                            Peroneal                              0.76          ALYSSA                                            TBI                          LEFT   Waveform        Systolic BPs (mmHg)                              142           Brachial   Triphasic                                Common Femoral   Monophasic                 124           Posterior Tibial   Monophasic                 113           Dorsalis Pedis                                            Peroneal                              0.87          ALYSSA                                            TBI  Conclusions   1.  Apparent tibial artery stenosis and possible occlusion bilaterally.    2.  Toe pressure indicate severe arteriial occlusive disease and suggest    the possibility of artifically elevated ankle brachial indices.    Arterial duplex imaging  CONCLUSIONS   1. Apparent normal arterial perfusion to the bilateral popliteal arteries.   2.  Tandem lesions in the right posterior tibial artery with short segment    occlusion and reconsituted flow in the distal artery.    3.  Distal occlusion of both the anterior tibial and peroneal artery.     4.  Left posterior tibial and peroneal artery occlusion with reconstitution    of the distal vessel.     5.  The left anterior tibial artery has tandem significant stenoses.    PAST MEDICAL HISTORY:   Past Medical History:   Diagnosis Date   • Diabetes (HCC)      PAST SURGICAL HISTORY:   Past Surgical History:   Procedure Laterality Date   • IRRIGATION & DEBRIDEMENT ORTHO Left 6/24/2019    Procedure: IRRIGATION AND DEBRIDEMENT, WOUND-FOOT, BIOLOGIC PLACEMENT, WOUND VAC PLACEMENT;  Surgeon: Charles Chopra M.D.;  Location: SURGERY [facility];  Service: Orthopedics        MEDICATIONS:   Current Outpatient Prescriptions    Medication   • insulin glargine (BASAGLAR KWIKPEN) 100 UNIT/ML Solution Pen-injector injection   • Insulin Pen Needle 32 G x 4 mm   • glipiZIDE (GLUCOTROL) 5 MG Tab   • aspirin 81 MG EC tablet   • atorvastatin (LIPITOR) 40 MG Tab     No current facility-administered medications for this visit.        ALLERGIES:  No Known Allergies      SOCIAL HISTORY:   Social History     Social History   • Marital status:      Spouse name: N/A   • Number of children: N/A   • Years of education: N/A     Social History Main Topics   • Smoking status: Never Smoker   • Smokeless tobacco: Never Used   • Alcohol use No   • Drug use: No   • Sexual activity: Not on file     Other Topics Concern   • Not on file     Social History Narrative   • No narrative on file       FAMILY HISTORY: History reviewed. No pertinent family history.     REVIEW OF SYSTEMS:   Review of Systems   Constitutional: Negative for chills and fever.   Respiratory: Negative for cough.    Cardiovascular: Negative for chest pain and claudication.        Less pain to left lower leg since vascular procedure   Gastrointestinal: Negative for constipation, diarrhea, nausea and vomiting.   Genitourinary: Negative for dysuria.   Musculoskeletal: Negative for joint pain.   Skin: Negative for rash.        Wounds to left dorsal foot since early May 2019, started as large blisters.   Neurological: Positive for sensory change.        Numbness in both feet   Psychiatric/Behavioral: Negative for depression. The patient is not nervous/anxious.        PHYSICAL EXAMINATION:     Physical Exam   Constitutional: He is oriented to person, place, and time and well-developed, well-nourished, and in no distress.   HENT:   Head: Normocephalic.   Eyes: Pupils are equal, round, and reactive to light.   Neck: Normal range of motion. Neck supple. No JVD present.   Cardiovascular: Normal rate and regular rhythm.    Palpable femoral and popliteal arteries bilaterally.   Unable to palpate  pedal pulses in left foot  With Doppler biphasic tones noted to DP and PT   Pulmonary/Chest: Effort normal. No respiratory distress.   Abdominal: Soft. He exhibits no distension.   Musculoskeletal: He exhibits edema (LLE greater than RLE).   Neurological: He is alert and oriented to person, place, and time.   Both feet insensate to light touch   Skin: Skin is warm.   Left dorsal distal first MTH ulcer with residual epi-fix product and dark purple discoloration connecting to proximal first metatarsal ulcer  Left second toe: Dry eschar  Left third dorsal toe: Dry eschar  Left lateral fifth MTH DTI: Intact skin dark purple discoloration  Left lateral proximal fifth metatarsal DTI: Intact skin dark purple discoloration, painful with palpation  Refer to wound flowsheet and photos   Psychiatric: Mood, memory, affect and judgment normal.       WOUND ASSESSMENT    Negative Pressure Wound Therapy Foot Left (Active)   NPWT Pump Mode / Pressure Setting Other (Comment) 7/19/2019 10:30 AM   Dressing Type Black foam 7/16/2019  2:30 PM   Number of Foam Pieces Used 2 7/16/2019  2:30 PM   Canister Changed Yes 7/16/2019  2:30 PM     Wound 07/05/19 Left lateral 3rd toe (Active)   Wound Image   7/19/2019 10:30 AM   Site Assessment Black;Brown;Dry;Other (Comment) 7/19/2019 10:30 AM   Angelica-wound Assessment Edema 7/19/2019 10:30 AM   Margins Defined edges 7/19/2019 10:30 AM   Wound Length (cm) 1 cm 7/19/2019 10:30 AM   Wound Width (cm) 1 cm 7/19/2019 10:30 AM   Wound Surface Area (cm^2) 1 cm^2 7/19/2019 10:30 AM   Drainage Amount None 7/19/2019 10:30 AM   Drainage Description Serosanguineous 7/16/2019  2:30 PM   Non-staged Wound Description Full thickness 7/16/2019  2:30 PM   Treatments Cleansed 7/19/2019 10:30 AM   Cleansing Normal Saline Irrigation 7/19/2019 10:30 AM   Periwound Protectant Not Applicable 7/19/2019 10:30 AM   Dressing Options Other (Comments);Dry Gauze;Hypafix Tape 7/19/2019 10:30 AM   Dressing Changed Changed 7/16/2019   2:30 PM   Dressing Status Clean;Dry;Intact 7/16/2019  2:30 PM   Dressing Change Frequency Every 72 hrs 7/16/2019  2:30 PM   NEXT Dressing Change  07/19/19 7/16/2019  2:30 PM   WOUND NURSE ONLY - Odor None 7/19/2019 10:30 AM   WOUND NURSE ONLY - Pulses 1+ 7/16/2019  2:30 PM   WOUND NURSE ONLY - Exposed Structures Other (Comments) 7/19/2019 10:30 AM   WOUND NURSE ONLY - Tissue Type and Percentage 100% black/brown eshar 7/19/2019 10:30 AM       Wound 07/05/19 Left dorsomedial foot (Active)   Wound Image    7/19/2019 10:30 AM   Site Assessment Black;Yellow;Red 7/19/2019 10:30 AM   Angelica-wound Assessment Intact 7/19/2019 10:30 AM   Margins Attached edges;Unattached edges 7/19/2019 10:30 AM   Wound Length (cm) 10.7 cm 7/19/2019 10:30 AM   Wound Width (cm) 4.7 cm 7/19/2019 10:30 AM   Wound Depth (cm) 0.4 cm 7/19/2019 10:30 AM   Wound Surface Area (cm^2) 50.29 cm^2 7/19/2019 10:30 AM   Post Wound Length (cm) 10.7 cm 7/19/2019 10:30 AM    Post Wound Width (cm) 4.8 cm 7/19/2019 10:30 AM   Post Wound Depth (cm) 0.4 cm 7/19/2019 10:30 AM   Post Wound Surface Area (cm^2) 51.36 cm^2 7/19/2019 10:30 AM   Tunneling 0 cm 7/5/2019  2:00 PM   Undermining 0 cm 7/5/2019  2:00 PM   Closure Secondary intention 7/16/2019  2:30 PM   Drainage Amount Moderate 7/19/2019 10:30 AM   Drainage Description Serosanguineous 7/19/2019 10:30 AM   Non-staged Wound Description Full thickness 7/19/2019 10:30 AM   Treatments Cleansed;Other (Comment) 7/19/2019 10:30 AM   Cleansing Normal Saline Irrigation 7/19/2019 10:30 AM   Periwound Protectant Skin Protectant wipes to Periwound;Hydrocolloid to Periwound 7/19/2019 10:30 AM   Dressing Options Honey Gel;Petroleum Gauze (clear);Absorbent Abdominal Pad;Hypafix Tape;Self Adherent Elastic Wrap 7/19/2019 10:30 AM   Dressing Changed Changed 7/16/2019  2:30 PM   Dressing Status Clean;Dry;Intact 7/16/2019  2:30 PM   Dressing Change Frequency Every 72 hrs 7/16/2019  2:30 PM   NEXT Dressing Change  07/19/19 7/16/2019   2:30 PM   WOUND NURSE ONLY - Odor None 7/19/2019 10:30 AM   WOUND NURSE ONLY - Pulses 1+ 7/16/2019  2:30 PM   WOUND NURSE ONLY - Exposed Structures Other (Comments) 7/19/2019 10:30 AM   WOUND NURSE ONLY - Tissue Type and Percentage Pre: 15% black/brown, 50% brown/yellow, 35% red/pink 7/19/2019 10:30 AM       Wound 07/05/19 Left dorsum 2nd toe (Active)   Wound Image   7/19/2019 10:30 AM   Site Assessment Black;Brown;Dry;Other (Comment) 7/19/2019 10:30 AM   Angelica-wound Assessment Intact 7/19/2019 10:30 AM   Margins Defined edges 7/19/2019 10:30 AM   Wound Length (cm) 1.5 cm 7/19/2019 10:30 AM   Wound Width (cm) 2 cm 7/19/2019 10:30 AM   Wound Surface Area (cm^2) 3 cm^2 7/19/2019 10:30 AM   Closure Secondary intention 7/16/2019  2:30 PM   Drainage Amount None 7/19/2019 10:30 AM   Drainage Description Serosanguineous 7/16/2019  2:30 PM   Non-staged Wound Description Full thickness 7/16/2019  2:30 PM   Treatments Cleansed 7/19/2019 10:30 AM   Cleansing Normal Saline Irrigation 7/19/2019 10:30 AM   Periwound Protectant Not Applicable 7/19/2019 10:30 AM   Dressing Options Other (Comments);Dry Gauze;Hypafix Tape 7/19/2019 10:30 AM   Dressing Changed Changed 7/16/2019  2:30 PM   Dressing Status Clean;Dry;Intact 7/16/2019  2:30 PM   Dressing Change Frequency Every 72 hrs 7/16/2019  2:30 PM   NEXT Dressing Change  07/19/19 7/16/2019  2:30 PM   WOUND NURSE ONLY - Odor None 7/19/2019 10:30 AM   WOUND NURSE ONLY - Pulses 1+ 7/16/2019  2:30 PM   WOUND NURSE ONLY - Exposed Structures Other (Comments) 7/19/2019 10:30 AM   WOUND NURSE ONLY - Tissue Type and Percentage 100% black/brown eschar 7/19/2019 10:30 AM            Wound photos  Left dorsal medial foot, proximal and distal wound.  Pre-debridement        Left second toe. No debridement today      Left 3rd toe.  No debridement today      Left lateral fifth MTH and left lateral proximal fifth metatarsal deep tissue injury-no debridement  Photo not taken today            Procedure:  Excisional debridement of left dorsal/ medial foot  -2% viscous lidocaine applied topically to wound bed for approximately 5 minutes prior to debridement  -Scalpel and forceps used to excise slough and eschar from wound bed.  Excisional debridement was performed to remove devitalized tissue until healthy, bleeding tissue was visualized.  Unable to establish 100% clean wound bed due to patient discomfort.  Total area debrided approximately 10 cm².  Tissue debrided into the muscle layer.    -Bleeding controlled with manual pressure.   -Wound cleansed with normal saline  -Wound culture collected, wound care completed per orders, by Jonah Manzo RN.    Betadine applied to eschar of second and third toes  DTI injuries left open to air      Post debridement photo  Left dorsal medial foot, proximal and distal wound.  Post debridement          PATIENT EDUCATION  - Importance of tight glucose control for wound healing   - Implications of loss of protective sensation (LOPS) discussed with patient- including increased risk for amputation.  - Advised to check feet at least daily, moisturize feet, and to always wear protective foot wear.   -  Importance of offloading foot to assist with wound healing  - Advised pt not to trim nails or calluses, seek foot/nail care from podiatrist or certified foot/nail nurse  - Importance of adequate nutrition for wound healing  - Increase protein intake (unless contraindicated by renal status)    ASSESSMENT AND PLAN:     1. Diabetic ulcer of left midfoot associated with type 2 diabetes mellitus, with other ulcer severity (HCC)  Comment: Necrotic wounds x2 to dorsum of left foot.  Present since 3/2019, started as blisters.  S/P revascularization procedure on 6/21 and left foot I&D, epi fix and wound VAC application by Dr. Chopar on 6/24/2019 7/19: Wound presents with slough and eschar in wound bed, not clean enough for VAC.   Limb preservation status highly guarded, discussed with patient and  his wife. Patient and family requesting that everything be done prior to amputation.  -Excisional debridement of wound in clinic today, medically necessary to promote wound healing.  -VAC on hold.  Resume once wound bed more adequately prepared  -ACell was not applied today     Wound care: Medical honey to promote autolytic debridement, Adaptic to maintain moisture, ABD pad to observe exudate, roll gauze to secure dressing    2. Uncontrolled type 2 diabetes mellitus with hyperglycemia ([facility])  Comments: Most recent A1c 8.9 % on 6/28/2019 7/19 Blood sugar today 112.  Seen by diabetes educator and dietitian in hospital.  Patient is vegetarian.  Discussed the importance of adequate sources of protein for wound healing  -Adverse effects of hyperglycemia on wound healing and general health discussed with patient  -He is encouraged to keep his blood sugars below 150 in order to optimize chances for wound healing.    3.  PAD  Comments: Arterial studies were ordered on patient's initial clinic visit, 5/9/2019.  Not completed until 5/24/2019.  S/P left DP angioplasty, left AT angioplasty and atherectomy, left PT angioplasty on 6/21/2019 by Dr. Chatterjee.    7/19: Foot warm.  DP and PT pulses are palpable  -DP and PT pulses are also audible with Doppler.    -Check pulses with Doppler at each visit.      4. Diabetic polyneuropathy associated with type 2 diabetes mellitus (HCC)    7/19: Complicating factor.  Patient to wear offloading shoe to left foot to provide protection and offloading surface.  Reiterated importance to patient and wife.  Verbalized understanding.    5. Wound infection  7/19: Wound culture collected in clinic today after debridement.    6.  Diabetic ulcer of toe of left foot associated with type 2 diabetes mellitus, with necrosis of muscle  Developed ulcer to left second and third toe   7/19: Dry eschar to L 2nd and dry eschar to L 3rd toe.   -Discontinue honey:  -Pain with Betadine    7.  Pressure injury of deep  tissue of left foot  Deep tissue injury to lateral left fifth MTH and to left proximal fifth metatarsal.    7/19: Skin intact, pain with palpation.  Patient to wear offloading shoe to alleviate pressure to this area.  Patient to float heel and foot with pillow when at home. Continue to monitor.     Please note that this dictation was created using voice recognition software. I have worked with technical experts from [facility] to optimize the interface.  I have made every reasonable attempt to correct obvious errors, but there may be errors of grammar and possibly content that I did not discover before finalizing the note.

## 2019-07-23 ENCOUNTER — OFFICE VISIT (OUTPATIENT)
Dept: WOUND CARE | Facility: MEDICAL CENTER | Age: 54
End: 2019-07-23
Attending: PHYSICIAN ASSISTANT
Payer: COMMERCIAL

## 2019-07-23 VITALS
HEART RATE: 88 BPM | RESPIRATION RATE: 18 BRPM | SYSTOLIC BLOOD PRESSURE: 108 MMHG | TEMPERATURE: 97.9 F | OXYGEN SATURATION: 98 % | DIASTOLIC BLOOD PRESSURE: 72 MMHG

## 2019-07-23 DIAGNOSIS — E11.65 UNCONTROLLED TYPE 2 DIABETES MELLITUS WITH HYPERGLYCEMIA (HCC): ICD-10-CM

## 2019-07-23 DIAGNOSIS — T14.8XXA WOUND INFECTION: ICD-10-CM

## 2019-07-23 DIAGNOSIS — L08.9 WOUND INFECTION: ICD-10-CM

## 2019-07-23 DIAGNOSIS — E11.621 DIABETIC ULCER OF TOE OF LEFT FOOT ASSOCIATED WITH TYPE 2 DIABETES MELLITUS, WITH NECROSIS OF MUSCLE (HCC): ICD-10-CM

## 2019-07-23 DIAGNOSIS — E11.621 DIABETIC ULCER OF LEFT MIDFOOT ASSOCIATED WITH TYPE 2 DIABETES MELLITUS, WITH OTHER ULCER SEVERITY (HCC): ICD-10-CM

## 2019-07-23 DIAGNOSIS — L97.523 DIABETIC ULCER OF TOE OF LEFT FOOT ASSOCIATED WITH TYPE 2 DIABETES MELLITUS, WITH NECROSIS OF MUSCLE (HCC): ICD-10-CM

## 2019-07-23 DIAGNOSIS — L97.428 DIABETIC ULCER OF LEFT MIDFOOT ASSOCIATED WITH TYPE 2 DIABETES MELLITUS, WITH OTHER ULCER SEVERITY (HCC): ICD-10-CM

## 2019-07-23 DIAGNOSIS — I73.9 PERIPHERAL ARTERIAL DISEASE (HCC): ICD-10-CM

## 2019-07-23 DIAGNOSIS — L89.896 PRESSURE INJURY OF DEEP TISSUE OF LEFT FOOT: ICD-10-CM

## 2019-07-23 PROCEDURE — 11043 DBRDMT MUSC&/FSCA 1ST 20/<: CPT

## 2019-07-23 PROCEDURE — 11043 DBRDMT MUSC&/FSCA 1ST 20/<: CPT | Performed by: NURSE PRACTITIONER

## 2019-07-23 RX ORDER — SULFAMETHOXAZOLE AND TRIMETHOPRIM 800; 160 MG/1; MG/1
1 TABLET ORAL 2 TIMES DAILY
Qty: 20 TAB | Refills: 0 | Status: SHIPPED | OUTPATIENT
Start: 2019-07-23 | End: 2019-08-02

## 2019-07-23 ASSESSMENT — ENCOUNTER SYMPTOMS
NAUSEA: 0
COUGH: 0
CONSTIPATION: 0
FEVER: 0
CLAUDICATION: 0
CHILLS: 0
DIARRHEA: 0
NERVOUS/ANXIOUS: 0
SENSORY CHANGE: 1
DEPRESSION: 0
VOMITING: 0

## 2019-07-23 NOTE — PATIENT INSTRUCTIONS
Keep dressing clean and dry and cover while bathing. Only change dressing if over saturated, soiled or its falling off.     Should you experience any significant changes in your wound(s) such as infection (redness, swelling, localized heat, increased pain, fever >101 F, chills) or have any questions regarding your home care instructions, please contact the wound center [phone]. If after hours, contact your primary care physician or go the hospital emergency room.

## 2019-07-23 NOTE — PROGRESS NOTES
Provider Encounter- Diabetic Foot Ulcer      HISTORY OF PRESENT ILLNESS               START OF CARE IN CLINIC: 2019 after hospitalization, (initial start date 19)               REFERRING PROVIDER: Judy Henry MD               WOUND- Diabetic foot ulcer complicated by PVD              LOCATION: Left dorsomedial foot distal                                   Left dorsomedial foot proximal              Left 2nd and 3rd toes     Left lateral fifth MTH DTI          left lateral proximal fifth metatarsal DTI                WOUND HISTORY: Pt reports he was in [place], wore leather shoes that were too tight and developed blisters in 2019. He cleaned ulcers with hydrogen peroxide but the ulcers worsened. He went to urgent care, was referred to wound clinic in May 9, 2019 and arterial studies were ordered that day. He did not complete studies until . Once studies were completed, he was scheduled to be seen by Dr. Graham at the clinic to discuss results however pt went out of town and was not seen until . After seeing Dr. Graham, direct admission was arranged for surgery but pt had difficulty grasping the seriousness of his condition. He did not present to hospital until 19.    He underwent left DP angioplasty, left AT angioplasty and atherectomy, left PT angioplasty on 2019 by Dr. Chatterjee.  He then had a left foot I&D with application of epi-fix and wound VAC by Dr. Chopra on 2019.  He was seen by limb preservation service in the hospital and discharged with wound care follow-up.                PERTINENT PMH: DMII, PVD               IMAGIN2019.  See below for results              VASCULAR STUDIES:     2019.  See below for results                         LAST  WOUND CULTURE DATE :  2019 RESULTS: Heavy growth MSSA                  OFFLOADING: Offloading shoe    DIABETES HX: Diagnosed with type 2 diabetes 15 years ago, and is currently managing with oral medication.  Checks  blood sugars 1-2 times per day and reports that these typically run around 170-180.  Has had previous diabetes education.  He does have some numbness in his feet.  Usually wears slippers or regular shoes. Does not check his feet routinely.   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  TOBACCO USE: He is never used tobacco products      7/5: Initial evaluation and initial provider visit after being in the hospital from 6/21-7/2/19.  Wife reports that when patient has ulcers to his legs they are always dry looking, look similar to how the ulcers look now and believes it is related to his ethnicity.  Education provided about the difference between wounds with adequate blood flow and without adequate blood flow.  Patient and wife are requesting that everything be done in order to save his limb.  He is wearing a sandal instead of his offloading shoe to the left foot.  Blood sugar today was 130.  Authorization placed for acell.  He has already been denied by insurance for epifix.  Continue with wound VAC therapy to promote wound healing.  Has LPS follow-up appointment on 7/12/2019 7/19/2019 : Clinic visit with YAMILKA Penaloza.  Increased slough and eschar in wound, not clean enough for VAC therapy.  VAC put on hold, Medihoney used for autolytic debridement.    7/23/2019 : Clinic visit with YAMILKA Penaloza.  Patient presents again today with his wife.  His current slough in wound or loosening with use of honey.  Continue VAC hold.  Wound culture results obtained from [facility], positive for heavy growth MSSA, Bactrim DS prescribed.    RESULTS:   Most recent A1c:   8.9% on 6/20/2019    X-ray of left foot on 5/17/2019:  Soft tissue ulceration along the first ray has no radiographic evidence of bony destruction to indicate osteomyelitis. If there is high clinical concern, MRI could be performed as is much more sensitive    Mild first metatarsal-phalangeal joint osteoarthritis    Vascular  calcifications    Fifth metatarsal-phalangeal centimeters soft tissue swelling is nonspecific, early gout or rheumatoid arthritis could be considered      Arterial studies on 5/24/2019  ALYSSA's           RIGHT     Waveform            Systolic BPs (mmHg)                              143           Brachial   Triphasic                                Common Femoral   Monophasic                 109           Posterior Tibial   Monophasic                 100           Dorsalis Pedis                                            Peroneal                              0.76          ALYSSA                                            TBI                          LEFT   Waveform        Systolic BPs (mmHg)                              142           Brachial   Triphasic                                Common Femoral   Monophasic                 124           Posterior Tibial   Monophasic                 113           Dorsalis Pedis                                            Peroneal                              0.87          ALYSSA                                            TBI  Conclusions   1.  Apparent tibial artery stenosis and possible occlusion bilaterally.    2.  Toe pressure indicate severe arteriial occlusive disease and suggest    the possibility of artifically elevated ankle brachial indices.    Arterial duplex imaging  CONCLUSIONS   1. Apparent normal arterial perfusion to the bilateral popliteal arteries.   2.  Tandem lesions in the right posterior tibial artery with short segment    occlusion and reconsituted flow in the distal artery.    3.  Distal occlusion of both the anterior tibial and peroneal artery.     4.  Left posterior tibial and peroneal artery occlusion with reconstitution    of the distal vessel.     5.  The left anterior tibial artery has tandem significant stenoses.    PAST MEDICAL HISTORY:   Past Medical History:   Diagnosis Date   • Diabetes (HCC)      PAST SURGICAL HISTORY:   Past Surgical History:   Procedure  Laterality Date   • IRRIGATION & DEBRIDEMENT ORTHO Left 6/24/2019    Procedure: IRRIGATION AND DEBRIDEMENT, WOUND-FOOT, BIOLOGIC PLACEMENT, WOUND VAC PLACEMENT;  Surgeon: Charles Chopra M.D.;  Location: SURGERY [facility];  Service: Orthopedics        MEDICATIONS:   Current Outpatient Prescriptions   Medication   • insulin glargine (BASAGLAR KWIKPEN) 100 UNIT/ML Solution Pen-injector injection   • Insulin Pen Needle 32 G x 4 mm   • glipiZIDE (GLUCOTROL) 5 MG Tab   • aspirin 81 MG EC tablet   • atorvastatin (LIPITOR) 40 MG Tab     No current facility-administered medications for this visit.        ALLERGIES:  No Known Allergies      SOCIAL HISTORY:   Social History     Social History   • Marital status:      Spouse name: N/A   • Number of children: N/A   • Years of education: N/A     Social History Main Topics   • Smoking status: Never Smoker   • Smokeless tobacco: Never Used   • Alcohol use No   • Drug use: No   • Sexual activity: Not on file     Other Topics Concern   • Not on file     Social History Narrative   • No narrative on file       FAMILY HISTORY: History reviewed. No pertinent family history.     REVIEW OF SYSTEMS:   Review of Systems   Constitutional: Negative for chills and fever.   Respiratory: Negative for cough.    Cardiovascular: Negative for chest pain and claudication.        Less pain to left lower leg since vascular procedure   Gastrointestinal: Negative for constipation, diarrhea, nausea and vomiting.   Genitourinary: Negative for dysuria.   Musculoskeletal: Negative for joint pain.   Skin: Negative for rash.        Wounds to left dorsal foot since early May 2019, started as large blisters.   Neurological: Positive for sensory change.        Numbness in both feet   Psychiatric/Behavioral: Negative for depression. The patient is not nervous/anxious.        PHYSICAL EXAMINATION:     Physical Exam   Constitutional: He is oriented to person, place, and time and well-developed,  well-nourished, and in no distress.   HENT:   Head: Normocephalic.   Eyes: Pupils are equal, round, and reactive to light.   Neck: Normal range of motion. Neck supple. No JVD present.   Cardiovascular: Normal rate and regular rhythm.    Palpable femoral and popliteal arteries bilaterally.   Unable to palpate pedal pulses in left foot  With Doppler biphasic tones noted to DP and PT   Pulmonary/Chest: Effort normal. No respiratory distress.   Abdominal: Soft. He exhibits no distension.   Musculoskeletal: He exhibits edema (LLE greater than RLE).   Neurological: He is alert and oriented to person, place, and time.   Both feet insensate to light touch   Skin: Skin is warm.   Left dorsal distal first MTH ulcer with residual epi-fix product and dark purple discoloration connecting to proximal first metatarsal ulcer  Left second toe: Dry eschar  Left third dorsal toe: Dry eschar  Left lateral fifth MTH DTI: Intact skin dark purple discoloration  Left lateral proximal fifth metatarsal DTI: Intact skin dark purple discoloration, painful with palpation  Refer to wound flowsheet and photos   Psychiatric: Mood, memory, affect and judgment normal.       WOUND ASSESSMENT     Negative Pressure Wound Therapy Foot Left (Active)   NPWT Pump Mode / Pressure Setting Other (Comment) 7/23/2019  9:15 AM   Dressing Type Black foam 7/16/2019  2:30 PM   Number of Foam Pieces Used 2 7/16/2019  2:30 PM   Canister Changed Yes 7/16/2019  2:30 PM     Wound 07/05/19 Left lateral 3rd toe (Active)   Wound Image   7/23/2019  9:15 AM   Site Assessment Black;Brown;Dry;Other (Comment) 7/23/2019  9:15 AM   Angelica-wound Assessment Intact 7/23/2019  9:15 AM   Margins Defined edges 7/23/2019  9:15 AM   Wound Length (cm) 1 cm 7/23/2019  9:15 AM   Wound Width (cm) 1 cm 7/23/2019  9:15 AM   Wound Surface Area (cm^2) 1 cm^2 7/23/2019  9:15 AM   Drainage Amount None 7/23/2019  9:15 AM   Drainage Description Serosanguineous 7/16/2019  2:30 PM   Non-staged Wound  Description Full thickness 7/16/2019  2:30 PM   Treatments Cleansed 7/23/2019  9:15 AM   Cleansing Normal Saline Irrigation 7/23/2019  9:15 AM   Periwound Protectant Not Applicable 7/23/2019  9:15 AM   Dressing Options Other (Comments);Open to Air 7/23/2019  9:15 AM   Dressing Changed Changed 7/16/2019  2:30 PM   Dressing Status Clean;Dry;Intact 7/16/2019  2:30 PM   Dressing Change Frequency Every 72 hrs 7/16/2019  2:30 PM   NEXT Dressing Change  07/19/19 7/16/2019  2:30 PM   WOUND NURSE ONLY - Odor None 7/23/2019  9:15 AM   WOUND NURSE ONLY - Pulses 1+ 7/16/2019  2:30 PM   WOUND NURSE ONLY - Exposed Structures Other (Comments) 7/23/2019  9:15 AM   WOUND NURSE ONLY - Tissue Type and Percentage 100% black/brown eschar 7/23/2019  9:15 AM       Wound 07/05/19 Left dorsomedial foot (Active)   Wound Image    7/23/2019  9:15 AM   Site Assessment Brown;Yellow;Red 7/23/2019  9:15 AM   Angelica-wound Assessment Edema 7/23/2019  9:15 AM   Margins Attached edges;Unattached edges 7/23/2019  9:15 AM   Wound Length (cm) 10.8 cm 7/23/2019  9:15 AM   Wound Width (cm) 4.5 cm 7/23/2019  9:15 AM   Wound Depth (cm) 0.4 cm 7/23/2019  9:15 AM   Wound Surface Area (cm^2) 48.6 cm^2 7/23/2019  9:15 AM   Post Wound Length (cm) 10.8 cm 7/23/2019  9:15 AM    Post Wound Width (cm) 4.5 cm 7/23/2019  9:15 AM   Post Wound Depth (cm) 0.4 cm 7/23/2019  9:15 AM   Post Wound Surface Area (cm^2) 48.6 cm^2 7/23/2019  9:15 AM   Tunneling 0 cm 7/5/2019  2:00 PM   Undermining 0.4 cm 7/23/2019  9:15 AM   Closure Secondary intention 7/16/2019  2:30 PM   Drainage Amount Large 7/23/2019  9:15 AM   Drainage Description Serosanguineous;Yellow;Other (Comment) 7/23/2019  9:15 AM   Non-staged Wound Description Full thickness 7/23/2019  9:15 AM   Treatments Cleansed;Pharmaceutical agent;Other (Comment) 7/23/2019  9:15 AM   Cleansing Normal Saline Irrigation 7/23/2019  9:15 AM   Periwound Protectant Skin Protectant wipes to Periwound;Hydrocolloid to Periwound 7/23/2019   9:15 AM   Dressing Options Honey Gel;Petroleum Gauze (clear);Hydrofiber;Absorbent Abdominal Pad;Roll Gauze;Self Adherent Elastic Wrap 7/23/2019  9:15 AM   Dressing Changed Changed 7/16/2019  2:30 PM   Dressing Status Clean;Dry;Intact 7/16/2019  2:30 PM   Dressing Change Frequency Every 72 hrs 7/16/2019  2:30 PM   NEXT Dressing Change  07/19/19 7/16/2019  2:30 PM   WOUND NURSE ONLY - Odor None 7/23/2019  9:15 AM   WOUND NURSE ONLY - Pulses 1+ 7/16/2019  2:30 PM   WOUND NURSE ONLY - Exposed Structures Other (Comments) 7/23/2019  9:15 AM   WOUND NURSE ONLY - Tissue Type and Percentage Pre: 15% brown, 50% yellow, 35% red 7/23/2019  9:15 AM       Wound 07/05/19 Left dorsum 2nd toe (Active)   Wound Image   7/23/2019  9:15 AM   Site Assessment Black;Brown;Dry;Other (Comment) 7/23/2019  9:15 AM   Angelica-wound Assessment Intact 7/23/2019  9:15 AM   Margins Defined edges 7/23/2019  9:15 AM   Wound Length (cm) 1.5 cm 7/23/2019  9:15 AM   Wound Width (cm) 2 cm 7/23/2019  9:15 AM   Wound Surface Area (cm^2) 3 cm^2 7/23/2019  9:15 AM   Closure Secondary intention 7/16/2019  2:30 PM   Drainage Amount None 7/23/2019  9:15 AM   Drainage Description Serosanguineous 7/16/2019  2:30 PM   Non-staged Wound Description Full thickness 7/16/2019  2:30 PM   Treatments Cleansed 7/23/2019  9:15 AM   Cleansing Normal Saline Irrigation 7/23/2019  9:15 AM   Periwound Protectant Not Applicable 7/23/2019  9:15 AM   Dressing Options Other (Comments);Open to Air 7/23/2019  9:15 AM   Dressing Changed Changed 7/16/2019  2:30 PM   Dressing Status Clean;Dry;Intact 7/16/2019  2:30 PM   Dressing Change Frequency Every 72 hrs 7/16/2019  2:30 PM   NEXT Dressing Change  07/19/19 7/16/2019  2:30 PM   WOUND NURSE ONLY - Odor None 7/23/2019  9:15 AM   WOUND NURSE ONLY - Pulses 1+ 7/16/2019  2:30 PM   WOUND NURSE ONLY - Exposed Structures Other (Comments) 7/23/2019  9:15 AM   WOUND NURSE ONLY - Tissue Type and Percentage 100% black/brown eschar 7/23/2019  9:15 AM               Wound photos  Left dorsal medial foot, proximal and distal wound.  Pre-debridement    Left dorsal second toe, no debridement today    Left lateral third toe, no debridement today    Left lateral fifth MTH, no debridement today        Procedure: Excisional debridement of left dorsal/ medial foot  -2% viscous lidocaine applied topically to wound bed for approximately 5 minutes prior to debridement  -Scalpel and forceps used to excise slough and eschar from wound bed.  Excisional debridement was performed to remove devitalized tissue until healthy, bleeding tissue was visualized.  Unable to establish 100% clean wound bed due to patient discomfort.  Total area debrided approximately 20 cm².  Tissue debrided into the muscle layer.    -Bleeding controlled with manual pressure.   -Wound cleansed with normal saline  -Wound culture collected, wound care completed per orders, by Jonah Manzo RN.    Betadine applied to eschar of second and third toes  DTI injuries left open to air      Post debridement photo  Left dorsal medial foot, proximal and distal wound.  Post debridement          PATIENT EDUCATION  - Importance of tight glucose control for wound healing   - Implications of loss of protective sensation (LOPS) discussed with patient- including increased risk for amputation.  - Advised to check feet at least daily, moisturize feet, and to always wear protective foot wear.   -  Importance of offloading foot to assist with wound healing  - Advised pt not to trim nails or calluses, seek foot/nail care from podiatrist or certified foot/nail nurse  - Importance of adequate nutrition for wound healing  - Increase protein intake (unless contraindicated by renal status)    ASSESSMENT AND PLAN:     1. Diabetic ulcer of left midfoot associated with type 2 diabetes mellitus, with other ulcer severity (HCC)  Comment: Necrotic wounds x2 to dorsum of left foot.  Present since 3/2019, started as blisters.  S/P  revascularization procedure on 6/21 and left foot I&D, epi fix and wound VAC application by Dr. Chopra on 6/24/2019 7/23: Eschar and slough loosening with use of honey, though still not clean enough for VAC.   Limb preservation status highly guarded, discussed with patient and his wife. Patient and family requesting that everything be done prior to amputation.  -Excisional debridement of wound in clinic today, medically necessary to promote wound healing.  -VAC on hold.  Resume once wound bed more adequately prepared  -ACell was not applied today     Wound care: Medical honey to promote autolytic debridement, Adaptic to maintain moisture, ABD pad to observe exudate, roll gauze to secure dressing    2. Uncontrolled type 2 diabetes mellitus with hyperglycemia (HCC)  Comments: Most recent A1c 8.9 % on 6/28/2019 7/23: Blood sugar today 119.  Seen by diabetes educator and dietitian in hospital.  Patient is vegetarian.  Understands need for adequate protein intake to promote wound healing.  -Adverse effects of hyperglycemia on wound healing and general health discussed with patient  -He is encouraged to keep his blood sugars below 150 in order to optimize chances for wound healing.    3.  PAD  Comments: Arterial studies were ordered on patient's initial clinic visit, 5/9/2019.  Not completed until 5/24/2019.  S/P left DP angioplasty, left AT angioplasty and atherectomy, left PT angioplasty on 6/21/2019 by Dr. Chatterjee.    7/23: Foot warm.  DP and PT pulses are palpable  -DP and PT pulses are also audible with Doppler.    -Check pulses with Doppler at each visit.      4. Diabetic polyneuropathy associated with type 2 diabetes mellitus (HCC)    7/23: Complicating factor.  Patient to wear offloading shoe to left foot to provide protection and offloading surface.  Reiterated importance to patient and wife.  Verbalized understanding.    5. Wound infection  7/23: Wound culture results obtained from [facility], positive  for heavy growth MSSA, sensitive to Bactrim  -Rx for Bactrim DS sent to patient's pharmacy  -Patient advised to go to emergency room if he expenses increased erythema, edema, and or if he experiences fevers, chills, nausea, vomiting.    6.  Diabetic ulcer of toe of left foot associated with type 2 diabetes mellitus, with necrosis of muscle  Developed ulcer to left second and third toe   7/23: Dry eschar to L 2nd and dry eschar to L 3rd toe.   -Discontinue honey:  -Paint with Betadine    7.  Pressure injury of deep tissue of left foot  Deep tissue injury to lateral left fifth MTH and to left proximal fifth metatarsal.    7/23: Skin intact, pain with palpation.  Patient to wear offloading shoe to alleviate pressure to this area.  Patient to float heel and foot with pillow when at home.   -Pain with Betadine  -Monitor at each clinic visit      Please note that this dictation was created using voice recognition software. I have worked with technical experts from [facility] to optimize the interface.  I have made every reasonable attempt to correct obvious errors, but there may be errors of grammar and possibly content that I did not discover before finalizing the note.

## 2019-07-26 ENCOUNTER — OFFICE VISIT (OUTPATIENT)
Dept: WOUND CARE | Facility: MEDICAL CENTER | Age: 54
End: 2019-07-26
Attending: PHYSICIAN ASSISTANT
Payer: COMMERCIAL

## 2019-07-26 DIAGNOSIS — E11.621 DIABETIC ULCER OF TOE OF LEFT FOOT ASSOCIATED WITH TYPE 2 DIABETES MELLITUS, WITH NECROSIS OF MUSCLE (HCC): ICD-10-CM

## 2019-07-26 DIAGNOSIS — E11.42 DIABETIC POLYNEUROPATHY ASSOCIATED WITH TYPE 2 DIABETES MELLITUS (HCC): ICD-10-CM

## 2019-07-26 DIAGNOSIS — E11.65 UNCONTROLLED TYPE 2 DIABETES MELLITUS WITH HYPERGLYCEMIA (HCC): ICD-10-CM

## 2019-07-26 DIAGNOSIS — E11.621 DIABETIC ULCER OF LEFT MIDFOOT ASSOCIATED WITH TYPE 2 DIABETES MELLITUS, WITH OTHER ULCER SEVERITY (HCC): ICD-10-CM

## 2019-07-26 DIAGNOSIS — L89.896 PRESSURE INJURY OF DEEP TISSUE OF LEFT FOOT: ICD-10-CM

## 2019-07-26 DIAGNOSIS — T14.8XXA WOUND INFECTION: ICD-10-CM

## 2019-07-26 DIAGNOSIS — L08.9 WOUND INFECTION: ICD-10-CM

## 2019-07-26 DIAGNOSIS — I73.9 PERIPHERAL ARTERIAL DISEASE (HCC): ICD-10-CM

## 2019-07-26 DIAGNOSIS — L97.428 DIABETIC ULCER OF LEFT MIDFOOT ASSOCIATED WITH TYPE 2 DIABETES MELLITUS, WITH OTHER ULCER SEVERITY (HCC): ICD-10-CM

## 2019-07-26 DIAGNOSIS — L97.523 DIABETIC ULCER OF TOE OF LEFT FOOT ASSOCIATED WITH TYPE 2 DIABETES MELLITUS, WITH NECROSIS OF MUSCLE (HCC): ICD-10-CM

## 2019-07-26 PROCEDURE — 11043 DBRDMT MUSC&/FSCA 1ST 20/<: CPT

## 2019-07-26 PROCEDURE — 11046 DBRDMT MUSC&/FSCA EA ADDL: CPT | Performed by: NURSE PRACTITIONER

## 2019-07-26 PROCEDURE — 11042 DBRDMT SUBQ TIS 1ST 20SQCM/<: CPT

## 2019-07-26 PROCEDURE — 11043 DBRDMT MUSC&/FSCA 1ST 20/<: CPT | Performed by: NURSE PRACTITIONER

## 2019-07-26 PROCEDURE — 11046 DBRDMT MUSC&/FSCA EA ADDL: CPT

## 2019-07-26 ASSESSMENT — ENCOUNTER SYMPTOMS
CONSTIPATION: 0
CHILLS: 0
COUGH: 0
DEPRESSION: 0
NAUSEA: 0
CLAUDICATION: 0
NERVOUS/ANXIOUS: 0
SENSORY CHANGE: 1
DIARRHEA: 0
VOMITING: 0
FEVER: 0

## 2019-07-26 NOTE — PROGRESS NOTES
Provider Encounter- Diabetic Foot Ulcer      HISTORY OF PRESENT ILLNESS               START OF CARE IN CLINIC: 2019 after hospitalization, (initial start date 19)               REFERRING PROVIDER: Judy Henry MD               WOUND- Diabetic foot ulcer complicated by PVD              LOCATION: Left dorsomedial foot distal                                   Left dorsomedial foot proximal              Left 2nd and 3rd toes     Left lateral fifth MTH DTI         left lateral proximal fifth metatarsal DTI     Left lateral fifth toe DTI                WOUND HISTORY: Pt reports he was in [place], wore leather shoes that were too tight and developed blisters in 2019. He cleaned ulcers with hydrogen peroxide but the ulcers worsened. He went to urgent care, was referred to wound clinic in May 9, 2019 and arterial studies were ordered that day. He did not complete studies until . Once studies were completed, he was scheduled to be seen by Dr. Graham at the clinic to discuss results however pt went out of town and was not seen until . After seeing Dr. Graham, direct admission was arranged for surgery but pt had difficulty grasping the seriousness of his condition. He did not present to hospital until 19.    He underwent left DP angioplasty, left AT angioplasty and atherectomy, left PT angioplasty on 2019 by Dr. Chatterjee.  He then had a left foot I&D with application of epi-fix and wound VAC by Dr. Chopra on 2019.  He was seen by limb preservation service in the hospital and discharged with wound care follow-up.                PERTINENT PMH: DMII, PVD               IMAGIN2019.  See below for results              VASCULAR STUDIES:     2019.  See below for results                         LAST  WOUND CULTURE DATE :  2019 RESULTS: Heavy growth MSSA                  OFFLOADING: Offloading shoe    DIABETES HX: Diagnosed with type 2 diabetes 15 years ago, and is currently managing  with oral medication.  Checks blood sugars 1-2 times per day and reports that these typically run around 170-180.  Has had previous diabetes education.  He does have some numbness in his feet.  Usually wears slippers or regular shoes. Does not check his feet routinely.   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  TOBACCO USE: He is never used tobacco products      7/5: Initial evaluation and initial provider visit after being in the hospital from 6/21-7/2/19.  Wife reports that when patient has ulcers to his legs they are always dry looking, look similar to how the ulcers look now and believes it is related to his ethnicity.  Education provided about the difference between wounds with adequate blood flow and without adequate blood flow.  Patient and wife are requesting that everything be done in order to save his limb.  He is wearing a sandal instead of his offloading shoe to the left foot.  Blood sugar today was 130.  Authorization placed for acell.  He has already been denied by insurance for epifix.  Continue with wound VAC therapy to promote wound healing.  Has LPS follow-up appointment on 7/12/2019 7/19/2019 : Clinic visit with YAMILKA Penaloza.  Increased slough and eschar in wound, not clean enough for VAC therapy.  VAC put on hold, Medihoney used for autolytic debridement.    7/23/2019 : Clinic visit with YAMILKA Penaloza.  Patient presents again today with his wife.  His current slough in wound or loosening with use of honey.  Continue VAC hold.  Wound culture results obtained from [facility], positive for heavy growth MSSA, Bactrim DS prescribed.    7/26/2019: Clinic visit with Corazon PRATHER.  Slough is slightly looser.  Continue VAC hold.  Blood sugar 118 today.  Forgot to take his Bactrim dose this morning.  Wearing offloading shoe.  Wondering if there is another device that can be used.  Discussed that an offloading boot can be prescribed.  He has follow-up with LPS rounds  next Friday, 8/2/2019.  Wife and patient would like to hold off on switching to an offloading boot until seen on LPS rounds.  Wife mentions that if amputation is recommended, they will seek second opinion in [place].  Patient denies any fevers, chills, nausea, vomiting.  Followed up with Dr. Chatterjee last week and patient reports that MD is happy with his blood flow and reports that the wound is looking great.      RESULTS:   Most recent A1c:   8.9% on 6/20/2019    X-ray of left foot on 5/17/2019:  Soft tissue ulceration along the first ray has no radiographic evidence of bony destruction to indicate osteomyelitis. If there is high clinical concern, MRI could be performed as is much more sensitive    Mild first metatarsal-phalangeal joint osteoarthritis    Vascular calcifications    Fifth metatarsal-phalangeal centimeters soft tissue swelling is nonspecific, early gout or rheumatoid arthritis could be considered      Arterial studies on 5/24/2019  ALYSSA's           RIGHT     Waveform            Systolic BPs (mmHg)                              143           Brachial   Triphasic                                Common Femoral   Monophasic                 109           Posterior Tibial   Monophasic                 100           Dorsalis Pedis                                            Peroneal                              0.76          ALYSSA                                            TBI                          LEFT   Waveform        Systolic BPs (mmHg)                              142           Brachial   Triphasic                                Common Femoral   Monophasic                 124           Posterior Tibial   Monophasic                 113           Dorsalis Pedis                                            Peroneal                              0.87          ALYSSA                                            TBI  Conclusions   1.  Apparent tibial artery stenosis and possible occlusion bilaterally.    2.  Toe pressure  indicate severe arteriial occlusive disease and suggest    the possibility of artifically elevated ankle brachial indices.    Arterial duplex imaging  CONCLUSIONS   1. Apparent normal arterial perfusion to the bilateral popliteal arteries.   2.  Tandem lesions in the right posterior tibial artery with short segment    occlusion and reconsituted flow in the distal artery.    3.  Distal occlusion of both the anterior tibial and peroneal artery.     4.  Left posterior tibial and peroneal artery occlusion with reconstitution    of the distal vessel.     5.  The left anterior tibial artery has tandem significant stenoses.    PAST MEDICAL HISTORY:   Past Medical History:   Diagnosis Date   • Diabetes (HCC)      PAST SURGICAL HISTORY:   Past Surgical History:   Procedure Laterality Date   • IRRIGATION & DEBRIDEMENT ORTHO Left 6/24/2019    Procedure: IRRIGATION AND DEBRIDEMENT, WOUND-FOOT, BIOLOGIC PLACEMENT, WOUND VAC PLACEMENT;  Surgeon: Charles Chopra M.D.;  Location: SURGERY [facility];  Service: Orthopedics        MEDICATIONS:   Current Outpatient Prescriptions   Medication   • sulfamethoxazole-trimethoprim (BACTRIM DS) 800-160 MG tablet   • insulin glargine (BASAGLAR KWIKPEN) 100 UNIT/ML Solution Pen-injector injection   • Insulin Pen Needle 32 G x 4 mm   • glipiZIDE (GLUCOTROL) 5 MG Tab   • aspirin 81 MG EC tablet   • atorvastatin (LIPITOR) 40 MG Tab     No current facility-administered medications for this visit.        ALLERGIES:  No Known Allergies      SOCIAL HISTORY:   Social History     Social History   • Marital status:      Spouse name: N/A   • Number of children: N/A   • Years of education: N/A     Social History Main Topics   • Smoking status: Never Smoker   • Smokeless tobacco: Never Used   • Alcohol use No   • Drug use: No   • Sexual activity: Not on file     Other Topics Concern   • Not on file     Social History Narrative   • No narrative on file       FAMILY HISTORY: No family history on  file.     REVIEW OF SYSTEMS:   Review of Systems   Constitutional: Negative for chills and fever.   Respiratory: Negative for cough.    Cardiovascular: Negative for chest pain and claudication.        Less pain to left lower leg since vascular procedure   Gastrointestinal: Negative for constipation, diarrhea, nausea and vomiting.   Genitourinary: Negative for dysuria.   Musculoskeletal: Negative for joint pain.   Skin: Negative for rash.        Wounds to left dorsal foot since early May 2019, started as large blisters.   Neurological: Positive for sensory change.        Numbness in both feet   Psychiatric/Behavioral: Negative for depression. The patient is not nervous/anxious.        PHYSICAL EXAMINATION:   There were no vitals taken for this visit.    Physical Exam   Constitutional: He is oriented to person, place, and time and well-developed, well-nourished, and in no distress.   HENT:   Head: Normocephalic.   Eyes: Pupils are equal, round, and reactive to light.   Neck: Normal range of motion. Neck supple. No JVD present.   Cardiovascular: Normal rate and regular rhythm.    Palpable femoral and popliteal arteries bilaterally.   Unable to palpate pedal pulses in left foot  With Doppler biphasic tones noted to DP and PT.  DP tone slightly sluggish   Pulmonary/Chest: Effort normal. No respiratory distress.   Abdominal: Soft. He exhibits no distension.   Musculoskeletal: He exhibits edema (LLE greater than RLE).   Neurological: He is alert and oriented to person, place, and time.   Both feet insensate to light touch   Skin: Skin is warm.   Left dorsal distal first MTH ulcer with slough and eschar connecting to proximal first metatarsal ulcer  Left second toe: eschar less woody appearance  Left third dorsal toe: eschar less woody appearance  Left lateral fifth MTH DTI: Intact skin eschar starting to form   left lateral proximal fifth metatarsal DTI: Intact skin dark eschar starting to form, dry  Left lateral fifth toe  DTI: Eschar starting to form, dry  Refer to wound flowsheet and photos   Psychiatric: Mood, memory, affect and judgment normal.       WOUND ASSESSMENT      See flowsheet      Wound photos  Left dorsal medial foot, proximal and distal wound.  Pre-debridement        Left dorsal second toe, no debridement today        Left lateral third toe, no debridement today        Left lateral fifth MTH, fifth proximal metatarsal, fifth toe, no debridement today  Photo not taken      Procedure: Excisional debridement of left dorsal/ medial foot  -2% viscous lidocaine applied topically to wound bed for approximately 15 minutes prior to debridement  -Scalpel and forceps and curette used to excise slough and eschar from wound bed.  Excisional debridement was performed to remove devitalized tissue until healthy, bleeding tissue was visualized.  Unable to establish 100% clean wound bed due to patient discomfort.  Total area debrided approximately 49 cm².  Crosshatched with scalpel eschar to middle of the wound additionally crosshatched adherent slough to distal aspect of wound to facilitate honey gel to automatically debride eschar.  Tissue debrided into the muscle layer.    -Bleeding controlled with manual pressure.   -Wound cleansed with normal saline, wound care completed per orders, by Gabino Jefferson RN    Betadine applied to eschar of second and third toes  DTI injuries left open to air      Post debridement photo  Left dorsal medial foot, proximal and distal wound.  Post debridement              PATIENT EDUCATION  - Importance of tight glucose control for wound healing   - Implications of loss of protective sensation (LOPS) discussed with patient- including increased risk for amputation.  - Advised to check feet at least daily, moisturize feet, and to always wear protective foot wear.   -  Importance of offloading foot to assist with wound healing  - Advised pt not to trim nails or calluses, seek foot/nail care from podiatrist  or certified foot/nail nurse  - Importance of adequate nutrition for wound healing  - Increase protein intake (unless contraindicated by renal status)    ASSESSMENT AND PLAN:     1. Diabetic ulcer of left midfoot associated with type 2 diabetes mellitus, with other ulcer severity (HCC)  Comment: Necrotic wounds x2 to dorsum of left foot.  Present since 3/2019, started as blisters.  S/P revascularization procedure on 6/21 and left foot I&D, epi fix and wound VAC application by Dr. Chopra on 6/24/2019 7/26: Prior right granular tissue is now covered with slough.  Eschar and slough loosening with use of honey, though still not clean enough for VAC.  Forefoot has a less woody appearance today.   Limb preservation status highly guarded, discussed with patient and his wife. Patient and family requesting that everything be done prior to amputation.  Wife reports that if amputation is recommended they will go to [facility] for second opinion.  -Excisional debridement of wound in clinic today, medically necessary to promote wound healing.  -VAC on hold.  Resume once wound bed more adequately prepared  -ACell was not applied today  -Patient to be seen on LPS rounds on 8/2/2019 with Dr. Chopra     Wound care: Medical honey to promote autolytic debridement, Adaptic to maintain moisture, Hydrofiber to absorb exudate, roll gauze to secure dressing    2. Uncontrolled type 2 diabetes mellitus with hyperglycemia (HCC)  Comments: Most recent A1c 8.9 % on 6/28/2019 7/26: Blood sugar today 118.  Consistent with earlier this week.  Seen by diabetes educator and dietitian in hospital.  Patient is vegetarian.  Understands need for adequate protein intake to promote wound healing.  -Adverse effects of hyperglycemia on wound healing and general health discussed with patient  -He is encouraged to keep his blood sugars below 150 in order to optimize chances for wound healing.    3.  PAD  Comments: Arterial studies were ordered on patient's  initial clinic visit, 5/9/2019.  Not completed until 5/24/2019.  S/P left DP angioplasty, left AT angioplasty and atherectomy, left PT angioplasty on 6/21/2019 by Dr. Chatterjee.    7/26: Slight increase in color foot, less woody appearance. Foot warm.  DP not palpable, PT palpable.  With Doppler PT brisk biphasic, DP sluggish, biphasic  -Check pulses with Doppler at each visit.  Patient seen by Dr. Chatterjee earlier this week and reports that MD is happy with his blood flow and wound healing.      4. Diabetic polyneuropathy associated with type 2 diabetes mellitus (HCC)    7/26: Complicating factor.  Patient to wear offloading shoe to left foot to provide protection and offloading surface.  Reiterated importance to patient and wife.  Verbalized understanding.    5. Wound infection    7/26: Wound culture results obtained from [facility], positive for heavy growth MSSA, sensitive to Bactrim  Taking Bactrim DS.  Forgot to take morning dose.  Patient going home after this appointment.  Instructed patient to take morning dose as soon as he gets home from this appointment and to take his evening dose as prescribed.  -Patient advised to go to emergency room if he expenses increased erythema, edema, and or if he experiences fevers, chills, nausea, vomiting.    6.  Diabetic ulcer of toe of left foot associated with type 2 diabetes mellitus, with necrosis of muscle  Developed ulcer to left second and third toe   7/26: Dry eschar to L 2nd and dry eschar to L 3rd toe.     -Continue to paint with Betadine    7.  Pressure injury of deep tissue of left foot  Deep tissue injury to lateral left fifth MTH, lateral left fifth toe, and to left proximal fifth metatarsal.    7/26: Skin intact, eschar starting to form.  Patient to wear offloading shoe to alleviate pressure to this area.  Patient to float heel and foot with pillow when at home.   -Pain with Betadine  -Monitor at each clinic visit  - Patient stating he is having difficulties  with offloading shoe.  Advised he switch to an offloading boot.  Reports that he will wait until he is seen at LPS rounds next week..    Please note that this dictation was created using voice recognition software. I have worked with technical experts from [facility] to optimize the interface.  I have made every reasonable attempt to correct obvious errors, but there may be errors of grammar and possibly content that I did not discover before finalizing the note.

## 2019-07-27 NOTE — PATIENT INSTRUCTIONS
Should you experience any significant changes in your wound(s) such as infection (redness, swelling, localized heat, increased pain, fever >101 F, chills) or have any questions regarding your home care instructions, please contact the wound center [phone]. If after hours, contact your primary care physician or go the hospital emergency room.  Keep dressing clean and dry and cover while bathing. Only change dressing if over saturated, soiled or its falling off.

## 2019-07-30 ENCOUNTER — OFFICE VISIT (OUTPATIENT)
Dept: WOUND CARE | Facility: MEDICAL CENTER | Age: 54
End: 2019-07-30
Attending: PHYSICIAN ASSISTANT
Payer: COMMERCIAL

## 2019-07-30 VITALS
RESPIRATION RATE: 18 BRPM | TEMPERATURE: 97.3 F | SYSTOLIC BLOOD PRESSURE: 123 MMHG | OXYGEN SATURATION: 97 % | HEART RATE: 94 BPM | DIASTOLIC BLOOD PRESSURE: 80 MMHG

## 2019-07-30 DIAGNOSIS — L97.523 DIABETIC ULCER OF TOE OF LEFT FOOT ASSOCIATED WITH TYPE 2 DIABETES MELLITUS, WITH NECROSIS OF MUSCLE (HCC): ICD-10-CM

## 2019-07-30 DIAGNOSIS — L89.896 PRESSURE INJURY OF DEEP TISSUE OF LEFT FOOT: ICD-10-CM

## 2019-07-30 DIAGNOSIS — E11.42 DIABETIC POLYNEUROPATHY ASSOCIATED WITH TYPE 2 DIABETES MELLITUS (HCC): ICD-10-CM

## 2019-07-30 DIAGNOSIS — E11.621 DIABETIC ULCER OF TOE OF LEFT FOOT ASSOCIATED WITH TYPE 2 DIABETES MELLITUS, WITH NECROSIS OF MUSCLE (HCC): ICD-10-CM

## 2019-07-30 DIAGNOSIS — T14.8XXA WOUND INFECTION: ICD-10-CM

## 2019-07-30 DIAGNOSIS — E11.65 UNCONTROLLED TYPE 2 DIABETES MELLITUS WITH HYPERGLYCEMIA (HCC): ICD-10-CM

## 2019-07-30 DIAGNOSIS — I73.9 PERIPHERAL ARTERIAL DISEASE (HCC): ICD-10-CM

## 2019-07-30 DIAGNOSIS — L08.9 WOUND INFECTION: ICD-10-CM

## 2019-07-30 PROCEDURE — 11043 DBRDMT MUSC&/FSCA 1ST 20/<: CPT | Performed by: NURSE PRACTITIONER

## 2019-07-30 PROCEDURE — 11043 DBRDMT MUSC&/FSCA 1ST 20/<: CPT

## 2019-07-30 ASSESSMENT — ENCOUNTER SYMPTOMS
SENSORY CHANGE: 1
FEVER: 0
VOMITING: 0
DEPRESSION: 0
NAUSEA: 0
COUGH: 0
CONSTIPATION: 0
NERVOUS/ANXIOUS: 0
DIARRHEA: 0
CLAUDICATION: 0
CHILLS: 0

## 2019-07-30 NOTE — PATIENT INSTRUCTIONS
Keep dressing clean and dry and cover while bathing. Only change dressing if over saturated, soiled or its falling off.     Should you experience any significant changes in your wound(s) such as infection (redness, swelling, localized heat, increased pain, fever >101 F, chills) or have any questions regarding your home care instructions, please contact the wound center [phone]. If after hours, contact your primary care physician or go the hospital emergency room.

## 2019-07-30 NOTE — PROGRESS NOTES
Provider Encounter- Diabetic Foot Ulcer      HISTORY OF PRESENT ILLNESS               START OF CARE IN CLINIC: 2019 after hospitalization, (initial start date 19)               REFERRING PROVIDER: Judy Henry MD               WOUND- Diabetic foot ulcer complicated by PVD              LOCATION: Left dorsomedial foot distal                                   Left dorsomedial foot proximal              Left 2nd and 3rd toes     Left lateral fifth MTH DTI         left lateral proximal fifth metatarsal DTI     Left lateral fifth toe DTI                WOUND HISTORY: Pt reports he was in [place], wore leather shoes that were too tight and developed blisters in 2019. He cleaned ulcers with hydrogen peroxide but the ulcers worsened. He went to urgent care, was referred to wound clinic in May 9, 2019 and arterial studies were ordered that day. He did not complete studies until . Once studies were completed, he was scheduled to be seen by Dr. Graham at the clinic to discuss results however pt went out of town and was not seen until . After seeing Dr. Graham, direct admission was arranged for surgery but pt had difficulty grasping the seriousness of his condition. He did not present to hospital until 19.    He underwent left DP angioplasty, left AT angioplasty and atherectomy, left PT angioplasty on 2019 by Dr. Chatterjee.  He then had a left foot I&D with application of epi-fix and wound VAC by Dr. Chopra on 2019.  He was seen by limb preservation service in the hospital and discharged with wound care follow-up.                PERTINENT PMH: DMII, PVD               [id number].  See below for results              VASCULAR STUDIES:     2019.  See below for results                         LAST  WOUND CULTURE DATE :  2019 RESULTS: Heavy growth MSSA                  OFFLOADING: Offloading shoe    DIABETES HX: Diagnosed with type 2 diabetes 15 years ago, and is currently managing  with oral medication.  Checks blood sugars 1-2 times per day and reports that these typically run around 170-180.  Has had previous diabetes education.  He does have some numbness in his feet.  Usually wears slippers or regular shoes. Does not check his feet routinely.   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  TOBACCO USE: He is never used tobacco products      7/5: Initial evaluation and initial provider visit after being in the hospital from 6/21-7/2/19.  Wife reports that when patient has ulcers to his legs they are always dry looking, look similar to how the ulcers look now and believes it is related to his ethnicity.  Education provided about the difference between wounds with adequate blood flow and without adequate blood flow.  Patient and wife are requesting that everything be done in order to save his limb.  He is wearing a sandal instead of his offloading shoe to the left foot.  Blood sugar today was 130.  Authorization placed for acell.  He has already been denied by insurance for epifix.  Continue with wound VAC therapy to promote wound healing.  Has LPS follow-up appointment on 7/12/2019 7/19/2019 : Clinic visit with YAMILKA Penaloza.  Increased slough and eschar in wound, not clean enough for VAC therapy.  VAC put on hold, Medihoney used for autolytic debridement.    7/23/2019 : Clinic visit with YAMILKA Penaloza.  Patient presents again today with his wife.  His current slough in wound or loosening with use of honey.  Continue VAC hold.  Wound culture results obtained from [facility], positive for heavy growth MSSA, Bactrim DS prescribed.    7/26/2019: Clinic visit with Corazon PRATHER.  Slough is slightly looser.  Continue VAC hold.  Blood sugar 118 today.  Forgot to take his Bactrim dose this morning.  Wearing offloading shoe.  Wondering if there is another device that can be used.  Discussed that an offloading boot can be prescribed.  He has follow-up with LPS rounds  next Friday, 8/2/2019.  Wife and patient would like to hold off on switching to an offloading boot until seen on LPS rounds.  Wife mentions that if amputation is recommended, they will seek second opinion in [place].  Patient denies any fevers, chills, nausea, vomiting.  Followed up with Dr. Chatterjee last week and patient reports that MD is happy with his blood flow and reports that the wound is looking great.    7/30/2019 : Clinic visit with YAMILKA Penaloza.  Patient presents today accompanied by his wife and his son.  There is slightly more granulation in the wound bed, however much of the wound remains necrotic.  Still not clean enough for VAC or [facility]Cell.  I cautioned patient and his family that he is still at considerable risk for amputation.  He reports his blood sugar today was 112.      RESULTS:   Most recent A1c:   8.9% on 6/20/2019    X-ray of left foot on 5/17/2019:  Soft tissue ulceration along the first ray has no radiographic evidence of bony destruction to indicate osteomyelitis. If there is high clinical concern, MRI could be performed as is much more sensitive    Mild first metatarsal-phalangeal joint osteoarthritis    Vascular calcifications    Fifth metatarsal-phalangeal centimeters soft tissue swelling is nonspecific, early gout or rheumatoid arthritis could be considered      Arterial studies on 5/24/2019  ALYSSA's           RIGHT     Waveform            Systolic BPs (mmHg)                              143           Brachial   Triphasic                                Common Femoral   Monophasic                 109           Posterior Tibial   Monophasic                 100           Dorsalis Pedis                                            Peroneal                              0.76          ALYSSA                                            TBI                          LEFT   Waveform        Systolic BPs (mmHg)                              142           Brachial   Triphasic                                 Common Femoral   Monophasic                 124           Posterior Tibial   Monophasic                 113           Dorsalis Pedis                                            Peroneal                              0.87          ALYSSA                                            TBI  Conclusions   1.  Apparent tibial artery stenosis and possible occlusion bilaterally.    2.  Toe pressure indicate severe arteriial occlusive disease and suggest    the possibility of artifically elevated ankle brachial indices.    Arterial duplex imaging  CONCLUSIONS   1. Apparent normal arterial perfusion to the bilateral popliteal arteries.   2.  Tandem lesions in the right posterior tibial artery with short segment    occlusion and reconsituted flow in the distal artery.    3.  Distal occlusion of both the anterior tibial and peroneal artery.     4.  Left posterior tibial and peroneal artery occlusion with reconstitution    of the distal vessel.     5.  The left anterior tibial artery has tandem significant stenoses.    PAST MEDICAL HISTORY:   Past Medical History:   Diagnosis Date   • Diabetes (HCC)      PAST SURGICAL HISTORY:   Past Surgical History:   Procedure Laterality Date   • IRRIGATION & DEBRIDEMENT ORTHO Left 6/24/2019    Procedure: IRRIGATION AND DEBRIDEMENT, WOUND-FOOT, BIOLOGIC PLACEMENT, WOUND VAC PLACEMENT;  Surgeon: Charles Chopra M.D.;  Location: SURGERY [facility];  Service: Orthopedics        MEDICATIONS:   Current Outpatient Prescriptions   Medication   • sulfamethoxazole-trimethoprim (BACTRIM DS) 800-160 MG tablet   • insulin glargine (BASAGLAR KWIKPEN) 100 UNIT/ML Solution Pen-injector injection   • Insulin Pen Needle 32 G x 4 mm   • glipiZIDE (GLUCOTROL) 5 MG Tab   • aspirin 81 MG EC tablet   • atorvastatin (LIPITOR) 40 MG Tab     No current facility-administered medications for this visit.        ALLERGIES:  No Known Allergies      SOCIAL HISTORY:   Social History     Social History   • Marital status:       Spouse name: N/A   • Number of children: N/A   • Years of education: N/A     Social History Main Topics   • Smoking status: Never Smoker   • Smokeless tobacco: Never Used   • Alcohol use No   • Drug use: No   • Sexual activity: Not on file     Other Topics Concern   • Not on file     Social History Narrative   • No narrative on file       FAMILY HISTORY: History reviewed. No pertinent family history.     REVIEW OF SYSTEMS:   Review of Systems   Constitutional: Negative for chills and fever.   Respiratory: Negative for cough.    Cardiovascular: Negative for chest pain and claudication.        Less pain to left lower leg since vascular procedure   Gastrointestinal: Negative for constipation, diarrhea, nausea and vomiting.   Genitourinary: Negative for dysuria.   Musculoskeletal: Negative for joint pain.   Skin: Negative for rash.        Wounds to left dorsal foot since early May 2019, started as large blisters.   Neurological: Positive for sensory change.        Numbness in both feet   Psychiatric/Behavioral: Negative for depression. The patient is not nervous/anxious.        PHYSICAL EXAMINATION:   /80   Pulse 94   Temp 36.3 °C (97.3 °F)   Resp 18   SpO2 97%     Physical Exam   Constitutional: He is oriented to person, place, and time and well-developed, well-nourished, and in no distress.   HENT:   Head: Normocephalic.   Eyes: Pupils are equal, round, and reactive to light.   Neck: Normal range of motion. Neck supple. No JVD present.   Cardiovascular: Normal rate and regular rhythm.    Palpable femoral and popliteal arteries bilaterally.   Unable to palpate pedal pulses in left foot  With Doppler biphasic tones noted to DP and PT.  DP tone slightly sluggish   Pulmonary/Chest: Effort normal. No respiratory distress.   Abdominal: Soft. He exhibits no distension.   Musculoskeletal: He exhibits edema (LLE greater than RLE).   Neurological: He is alert and oriented to person, place, and time.   Both  feet insensate to light touch   Skin: Skin is warm.   Left dorsal distal first MTH ulcer with slough and eschar connecting to proximal first metatarsal ulcer  Left second toe: eschar less woody appearance  Left third dorsal toe: eschar less woody appearance  Left lateral fifth MTH DTI: Intact skin eschar starting to form   left lateral proximal fifth metatarsal DTI: Intact skin dark eschar starting to form, dry  Left lateral fifth toe DTI: Eschar starting to form, dry  Refer to wound flowsheet and photos   Psychiatric: Mood, memory, affect and judgment normal.       WOUND ASSESSMENT        Negative Pressure Wound Therapy Foot Left (Active)   NPWT Pump Mode / Pressure Setting Other (Comment) 7/30/2019 10:00 AM   Dressing Type Black foam 7/16/2019  2:30 PM   Number of Foam Pieces Used 2 7/16/2019  2:30 PM   Canister Changed Yes 7/16/2019  2:30 PM     Wound 07/05/19 Left lateral 3rd toe (Active)   Wound Image   7/30/2019 10:00 AM   Site Assessment Black;Brown;Dry;Other (Comment) 7/30/2019 10:00 AM   Angelica-wound Assessment Intact 7/30/2019 10:00 AM   Margins Defined edges 7/30/2019 10:00 AM   Wound Length (cm) 1.2 cm 7/30/2019 10:00 AM   Wound Width (cm) 1.1 cm 7/30/2019 10:00 AM   Wound Surface Area (cm^2) 1.32 cm^2 7/30/2019 10:00 AM   Drainage Amount None 7/30/2019 10:00 AM   Drainage Description Serosanguineous 7/16/2019  2:30 PM   Non-staged Wound Description Full thickness 7/16/2019  2:30 PM   Treatments Cleansed 7/30/2019 10:00 AM   Cleansing Normal Saline Irrigation 7/30/2019 10:00 AM   Periwound Protectant Not Applicable 7/30/2019 10:00 AM   Dressing Options Other (Comments) 7/30/2019 10:00 AM   Dressing Changed Changed 7/16/2019  2:30 PM   Dressing Status Clean;Dry;Intact 7/16/2019  2:30 PM   Dressing Change Frequency Every 72 hrs 7/16/2019  2:30 PM   NEXT Dressing Change  07/19/19 7/16/2019  2:30 PM   WOUND NURSE ONLY - Odor None 7/30/2019 10:00 AM   WOUND NURSE ONLY - Pulses 1+ 7/16/2019  2:30 PM   WOUND  NURSE ONLY - Exposed Structures Other (Comments) 7/30/2019 10:00 AM   WOUND NURSE ONLY - Tissue Type and Percentage Pre: 100% black eschar 7/30/2019 10:00 AM       Wound 07/05/19 Left dorsomedial foot (Active)   Wound Image    7/30/2019 10:00 AM   Site Assessment Brown;Yellow;Red 7/30/2019 10:00 AM   Angelica-wound Assessment Edema 7/30/2019 10:00 AM   Margins Attached edges;Unattached edges 7/30/2019 10:00 AM   Wound Length (cm) 10.5 cm 7/30/2019 10:00 AM   Wound Width (cm) 4.5 cm 7/30/2019 10:00 AM   Wound Depth (cm) 0.5 cm 7/30/2019 10:00 AM   Wound Surface Area (cm^2) 47.25 cm^2 7/30/2019 10:00 AM   Post Wound Length (cm) 10.5 cm 7/30/2019 10:00 AM    Post Wound Width (cm) 4.6 cm 7/30/2019 10:00 AM   Post Wound Depth (cm) 0.5 cm 7/30/2019 10:00 AM   Post Wound Surface Area (cm^2) 48.3 cm^2 7/30/2019 10:00 AM   Tunneling 0 cm 7/5/2019  2:00 PM   Undermining 0.5 cm 7/30/2019 10:00 AM   Closure Secondary intention 7/16/2019  2:30 PM   Drainage Amount Large 7/30/2019 10:00 AM   Drainage Description Serosanguineous;Yellow;Other (Comment) 7/30/2019 10:00 AM   Non-staged Wound Description Full thickness 7/30/2019 10:00 AM   Treatments Cleansed;Pharmaceutical agent;Other (Comment) 7/30/2019 10:00 AM   Cleansing Normal Saline Irrigation 7/30/2019 10:00 AM   Periwound Protectant Skin Protectant wipes to Periwound;Hydrocolloid to Periwound 7/30/2019 10:00 AM   Dressing Options Honey Gel;Hydrofiber;Roll Gauze;Hypafix Tape;Tubigrip 7/30/2019 10:00 AM   Dressing Changed Changed 7/16/2019  2:30 PM   Dressing Status Clean;Dry;Intact 7/16/2019  2:30 PM   Dressing Change Frequency Every 72 hrs 7/16/2019  2:30 PM   NEXT Dressing Change  07/19/19 7/16/2019  2:30 PM   WOUND NURSE ONLY - Odor None 7/30/2019 10:00 AM   WOUND NURSE ONLY - Pulses Other (Comments) 7/30/2019 10:00 AM   WOUND NURSE ONLY - Exposed Structures Other (Comments) 7/30/2019 10:00 AM   WOUND NURSE ONLY - Tissue Type and Percentage Pre: 30% brown, 40% yellow, 30% red  7/30/2019 10:00 AM       Wound 07/05/19 Left dorsum 2nd toe (Active)   Wound Image   7/30/2019 10:00 AM   Site Assessment Black;Brown;Dry;Other (Comment) 7/30/2019 10:00 AM   Angelica-wound Assessment Intact 7/30/2019 10:00 AM   Margins Defined edges 7/30/2019 10:00 AM   Wound Length (cm) 1.7 cm 7/30/2019 10:00 AM   Wound Width (cm) 2 cm 7/30/2019 10:00 AM   Wound Surface Area (cm^2) 3.4 cm^2 7/30/2019 10:00 AM   Closure Secondary intention 7/16/2019  2:30 PM   Drainage Amount None 7/30/2019 10:00 AM   Drainage Description Serosanguineous 7/16/2019  2:30 PM   Non-staged Wound Description Full thickness 7/16/2019  2:30 PM   Treatments Cleansed 7/30/2019 10:00 AM   Cleansing Normal Saline Irrigation 7/30/2019 10:00 AM   Periwound Protectant Not Applicable 7/30/2019 10:00 AM   Dressing Options Other (Comments) 7/30/2019 10:00 AM   Dressing Changed Changed 7/16/2019  2:30 PM   Dressing Status Clean;Dry;Intact 7/16/2019  2:30 PM   Dressing Change Frequency Every 72 hrs 7/16/2019  2:30 PM   NEXT Dressing Change  07/19/19 7/16/2019  2:30 PM   WOUND NURSE ONLY - Odor None 7/30/2019 10:00 AM   WOUND NURSE ONLY - Pulses 1+ 7/16/2019  2:30 PM   WOUND NURSE ONLY - Exposed Structures Other (Comments) 7/30/2019 10:00 AM   WOUND NURSE ONLY - Tissue Type and Percentage 100% black escahr 7/30/2019 10:00 AM       Wound 07/30/19 Left Lateral 5th MTH (Active)   Wound Image   7/30/2019 10:00 AM   Site Assessment Black;Other (Comment) 7/30/2019 10:00 AM   Angelica-wound Assessment Callused 7/30/2019 10:00 AM   Margins Attached edges 7/30/2019 10:00 AM   Drainage Amount None 7/30/2019 10:00 AM   Treatments Cleansed 7/30/2019 10:00 AM   Cleansing Normal Saline Irrigation 7/30/2019 10:00 AM   Periwound Protectant Not Applicable 7/30/2019 10:00 AM   Dressing Options Other (Comments) 7/30/2019 10:00 AM   WOUND NURSE ONLY - Odor None 7/30/2019 10:00 AM   WOUND NURSE ONLY - Exposed Structures Other (Comments) 7/30/2019 10:00 AM   WOUND NURSE ONLY -  Tissue Type and Percentage 100% black 7/30/2019 10:00 AM            Wound photos  Left dorsal medial foot, proximal and distal wound.  Pre-debridement            Left dorsal second toe, no debridement today            Left lateral third toe, no debridement today            Left lateral fifth MTH, fifth proximal metatarsal, fifth toe, no debridement today            Procedure: Excisional debridement of left dorsal/ medial foot  -2% viscous lidocaine applied topically to wound bed for approximately 15 minutes prior to debridement  -Scalpel and forceps and curette used to excise slough and eschar from wound bed.  Excisional debridement was performed to remove devitalized tissue until healthy, bleeding tissue was visualized.  Unable to establish 100% clean wound bed due to patient discomfort.  Total area debrided approximately 20 cm².  I then used a curette to thin the thick eschar near the middle of the wound in order to allow for better penetration of honey gel.  Deepest level of debridement was into the muscle layer.  -Bleeding controlled with manual pressure.   -Wound cleansed with normal saline, wound care completed per orders, by Jonah Manzo RN-refer to wound flowsheet    Betadine applied to eschar of second and third toes  DTI injuries left open to air      Post debridement photo  Left dorsal medial foot, proximal and distal wound.  Post debridement                  PATIENT EDUCATION  - Importance of tight glucose control for wound healing   - Implications of loss of protective sensation (LOPS) discussed with patient- including increased risk for amputation.  - Advised to check feet at least daily, moisturize feet, and to always wear protective foot wear.   -  Importance of offloading foot to assist with wound healing  - Advised pt not to trim nails or calluses, seek foot/nail care from podiatrist or certified foot/nail nurse  - Importance of adequate nutrition for wound healing  - Increase protein intake (unless  contraindicated by renal status)    ASSESSMENT AND PLAN:     1. Diabetic ulcer of left midfoot associated with type 2 diabetes mellitus, with other ulcer severity (HCC)  Comment: Necrotic wounds x2 to dorsum of left foot.  Present since 3/2019, started as blisters.  S/P revascularization procedure on 6/21 and left foot I&D, epi fix and wound VAC application by Dr. Chopra on 6/24/2019 7/30: Eschar and slough loosening with use of honey, though still not clean enough for VAC.  Forefoot has a less woody appearance    Limb preservation status highly guarded, discussed with patient and his wife. Patient and family requesting that everything be done prior to amputation.   -Excisional debridement of wound in clinic today, medically necessary to promote wound healing.  -VAC on hold.  Resume once wound bed more adequately prepared  -ACell was not applied today  -Patient to be seen on LPS rounds on 8/2/2019 with Dr. Chopra     Wound care: Medical honey to promote autolytic debridement, Adaptic to maintain moisture, Hydrofiber to absorb exudate, roll gauze to secure dressing    2. Uncontrolled type 2 diabetes mellitus with hyperglycemia (HCC)  Comments: Most recent A1c 8.9 % on 6/28/2019 7/30: Blood sugar today 112. Seen by diabetes educator and dietitian in hospital.  Patient is vegetarian.  Understands need for adequate protein intake to promote wound healing.  -Adverse effects of hyperglycemia on wound healing and general health discussed with patient  -He is encouraged to keep his blood sugars below 150 in order to optimize chances for wound healing.    3.  PAD  Comments: Arterial studies were ordered on patient's initial clinic visit, 5/9/2019.  Not completed until 5/24/2019.  S/P left DP angioplasty, left AT angioplasty and atherectomy, left PT angioplasty on 6/21/2019 by Dr. Chatterjee.    7/30: Slight increase in color foot, less woody appearance. Foot warm.  DP not palpable, PT palpable.  With Doppler PT brisk  biphasic, DP monophasic  -Check pulses with Doppler at each visit.  Patient seen by Dr. Chatterjee earlier this week and reports that MD is happy with his blood flow and wound healing.      4. Diabetic polyneuropathy associated with type 2 diabetes mellitus (HCC)    7/30: Complicating factor.  Patient to wear offloading shoe to left foot to provide protection and offloading surface.  Reiterated importance to patient and wife.  Verbalized understanding.    5. Wound infection    7/30: Wound culture results obtained from [facility], positive for heavy growth MSSA, sensitive to Bactrim  -10-day course of Bactrim prescribed on 7/23.  Patient states he is taking his antibiotic as prescribed  -Patient advised to go to emergency room if he expenses increased erythema, edema, and or if he experiences fevers, chills, nausea, vomiting.    6.  Diabetic ulcer of toe of left foot associated with type 2 diabetes mellitus, with necrosis of muscle  Developed ulcer to left second and third toe   7/30: Dry eschar to L 2nd and dry eschar to L 3rd toe.     -Continue to paint with Betadine    7.  Pressure injury of deep tissue of left foot  Deep tissue injury to lateral left fifth MTH, lateral left fifth toe, and to left proximal fifth metatarsal.    7/30: Skin intact, now with dry eschar.  Patient to wear offloading shoe to alleviate pressure to this area.  Patient to float heel and foot with pillow when at home.   -Pain with Betadine  -Monitor at each clinic visit  - Patient stating he is having difficulties with offloading shoe.  Advised he switch to an offloading boot.  Reports that he will wait until he is seen at LPS rounds next week..    Please note that this dictation was created using voice recognition software. I have worked with technical experts from [facility] to optimize the interface.  I have made every reasonable attempt to correct obvious errors, but there may be errors of grammar and possibly content that I did not  discover before finalizing the note.

## 2019-08-02 ENCOUNTER — OFFICE VISIT (OUTPATIENT)
Dept: WOUND CARE | Facility: MEDICAL CENTER | Age: 54
End: 2019-08-02
Attending: PHYSICIAN ASSISTANT
Payer: COMMERCIAL

## 2019-08-02 DIAGNOSIS — E11.621 TYPE 2 DIABETES MELLITUS WITH FOOT ULCER, WITHOUT LONG-TERM CURRENT USE OF INSULIN (HCC): ICD-10-CM

## 2019-08-02 DIAGNOSIS — T14.8XXA WOUND INFECTION: ICD-10-CM

## 2019-08-02 DIAGNOSIS — E11.621 DIABETIC ULCER OF LEFT MIDFOOT ASSOCIATED WITH TYPE 2 DIABETES MELLITUS, WITH OTHER ULCER SEVERITY (HCC): ICD-10-CM

## 2019-08-02 DIAGNOSIS — L97.509 TYPE 2 DIABETES MELLITUS WITH FOOT ULCER, WITHOUT LONG-TERM CURRENT USE OF INSULIN (HCC): ICD-10-CM

## 2019-08-02 DIAGNOSIS — L97.523 DIABETIC ULCER OF TOE OF LEFT FOOT ASSOCIATED WITH TYPE 2 DIABETES MELLITUS, WITH NECROSIS OF MUSCLE (HCC): ICD-10-CM

## 2019-08-02 DIAGNOSIS — E11.621 DIABETIC ULCER OF TOE OF LEFT FOOT ASSOCIATED WITH TYPE 2 DIABETES MELLITUS, WITH NECROSIS OF MUSCLE (HCC): ICD-10-CM

## 2019-08-02 DIAGNOSIS — L89.896 PRESSURE INJURY OF DEEP TISSUE OF LEFT FOOT: ICD-10-CM

## 2019-08-02 DIAGNOSIS — I73.9 PERIPHERAL ARTERIAL DISEASE (HCC): ICD-10-CM

## 2019-08-02 DIAGNOSIS — E11.42 DIABETIC POLYNEUROPATHY ASSOCIATED WITH TYPE 2 DIABETES MELLITUS (HCC): ICD-10-CM

## 2019-08-02 DIAGNOSIS — L08.9 WOUND INFECTION: ICD-10-CM

## 2019-08-02 DIAGNOSIS — L97.428 DIABETIC ULCER OF LEFT MIDFOOT ASSOCIATED WITH TYPE 2 DIABETES MELLITUS, WITH OTHER ULCER SEVERITY (HCC): ICD-10-CM

## 2019-08-02 PROCEDURE — 99213 OFFICE O/P EST LOW 20 MIN: CPT | Performed by: NURSE PRACTITIONER

## 2019-08-02 PROCEDURE — 99213 OFFICE O/P EST LOW 20 MIN: CPT

## 2019-08-02 RX ORDER — SULFAMETHOXAZOLE AND TRIMETHOPRIM 800; 160 MG/1; MG/1
1 TABLET ORAL 2 TIMES DAILY
Qty: 20 TAB | Refills: 0 | Status: SHIPPED | OUTPATIENT
Start: 2019-08-02 | End: 2019-08-12

## 2019-08-02 NOTE — PROGRESS NOTES
Pt seen during ortho rounds with LPS team for Left dorsomedial foot wound.  Measurements(cm): 10.5x5.0x0.5cm. Following physician assessment, RN evaluated patient's wound and dressed with bravastrips outlined the wound bed, medihoney gel with adaptic to cover top, ABD secured with hypafix tape and kerlix to secure.

## 2019-08-04 NOTE — PROGRESS NOTES
LIMB PRESERVATION SERVICE ROUNDS    Patient seen in collaboration with interdisciplinary team during LPS rounds in wound clinic for left medial dorsal foot, left second toe, left third toe, left lateral foot ulcers.  Ulcer started in March 2019 as blisters after wearing ill fitting shoes.  He started wound care in May 2019 and arterial studies were ordered that day.    He did not complete studies until 5/24. Once studies were completed, he was scheduled to be seen by Dr. Graham at the clinic to discuss results however pt went out of town and was not seen until 6/17. After seeing Dr. Graham, direct admission was arranged for surgery but pt had difficulty grasping the seriousness of his condition. He did not present to hospital until 6/21/19.    He underwent left DP angioplasty, left AT angioplasty and atherectomy, left PT angioplasty on 6/21/2019 by Dr. Chatterjee.  He then had a left foot I&D with application of epi-fix and wound VAC by Dr. Chopra on 6/24/2019.  He was seen by limb preservation service in the hospital and discharged with wound care follow-up.    Patient has been following up with wound care twice weekly.  ACell and VAC therapy were initiated however patient had excessive amount of nonviable tissue.  Both modalities were stopped and patient was started on honey gel with debridements.  Wound continues to have a significant amount of slough.  Patient continues to keep blood sugars less than 130.  Patient still has pain to first MTH ulcer.  Family reports that patient is now putting no weight to his left foot and using a front wheel walker.  Patient and family both think that wounds are continuing to show improvement but slowly.  He completed Bactrim DS yesterday.  Denies fevers, chills, nausea, vomiting.        RESULTS:  A1c 8.9% on 6/20/2019      OBJECTIVE FINDINGS:    DP pulse with Doppler sluggish, biphasic.  PT pulse with Doppler biphasic    Wound:  Eschar in between distal and proximal medial  dorsal foot ulcers.  Some red granular buds to the lateral aspect of the distal and proximal ulcers to dorsum foot.  Slough to medial aspect with UM and tenderness to 1st MTH    Dry black eschar to left second and left third toe  DTI evolving to unstageable with black eschar to left lateral fifth toe, 5th MTH and left proximal lateral fifth metatarsal.        Nonsurgical and surgical options discussed with patient, wife and son.  Nonsurgical options discussed were wound care.  Surgical options included  I&D, vera liu VAC therapy.  BKA was also discussed and is recommended over a TMA by Dr. Chopra as patient would require a proximal TMA and would be at risk for a non-functional limb.   patient and family do not wish to have any sort of amputation.  They are still undecided if they would like to proceed with I&D.  They would like everything to be done to save the foot.  Inquiring about hyperbaric therapy.  Patient and family willing to commit to 5 days a week 2 hours each day for 40 sessions.        PLAN:   Wound Care: Continue at [facility] 2 times a week .  Imaging: No further imaging at this  Offloading: Offloading shoe to be worn when ambulating.  Heel weight-bear  Antibiotics: Ordered BMP.  Renewed Bactrim for 10 days.  Refer to infectious disease as patient does not wish to have any amputation.    Surgery: Does not wish to have any surgical amputation but still undecided on whether or not he would like to have a right foot I&D.  Patient to update YAMILKA Penaloza at next visit on 8/6/2019  Referral: Infectious disease, hyperbarics      LPS Follow up: No further appointments at this time.      Please note that this dictation was created using voice recognition software. I have  worked with technical experts from [employer] to optimize the interface.  I have made every reasonable attempt to correct obvious errors, but there may be errors of grammar and possibly content that I did not discover before finalizing the  note.

## 2019-08-06 ENCOUNTER — TELEPHONE (OUTPATIENT)
Dept: INFECTIOUS DISEASES | Facility: MEDICAL CENTER | Age: 54
End: 2019-08-06

## 2019-08-06 ENCOUNTER — APPOINTMENT (OUTPATIENT)
Dept: WOUND CARE | Facility: MEDICAL CENTER | Age: 54
End: 2019-08-06
Attending: PHYSICIAN ASSISTANT
Payer: COMMERCIAL

## 2019-08-09 ENCOUNTER — APPOINTMENT (OUTPATIENT)
Dept: WOUND CARE | Facility: MEDICAL CENTER | Age: 54
End: 2019-08-09
Attending: PHYSICIAN ASSISTANT
Payer: COMMERCIAL

## 2019-08-13 ENCOUNTER — APPOINTMENT (OUTPATIENT)
Dept: WOUND CARE | Facility: MEDICAL CENTER | Age: 54
End: 2019-08-13
Attending: PHYSICIAN ASSISTANT
Payer: COMMERCIAL

## 2019-08-16 ENCOUNTER — OFFICE VISIT (OUTPATIENT)
Dept: WOUND CARE | Facility: MEDICAL CENTER | Age: 54
End: 2019-08-16
Attending: PHYSICIAN ASSISTANT
Payer: COMMERCIAL

## 2019-08-16 ENCOUNTER — HOSPITAL ENCOUNTER (OUTPATIENT)
Dept: RADIOLOGY | Facility: MEDICAL CENTER | Age: 54
End: 2019-08-16
Attending: NURSE PRACTITIONER
Payer: COMMERCIAL

## 2019-08-16 VITALS
DIASTOLIC BLOOD PRESSURE: 86 MMHG | HEART RATE: 105 BPM | RESPIRATION RATE: 18 BRPM | OXYGEN SATURATION: 97 % | TEMPERATURE: 98.2 F | SYSTOLIC BLOOD PRESSURE: 136 MMHG

## 2019-08-16 DIAGNOSIS — E11.42 DIABETIC POLYNEUROPATHY ASSOCIATED WITH TYPE 2 DIABETES MELLITUS (HCC): ICD-10-CM

## 2019-08-16 DIAGNOSIS — L97.428 DIABETIC ULCER OF LEFT MIDFOOT ASSOCIATED WITH TYPE 2 DIABETES MELLITUS, WITH OTHER ULCER SEVERITY (HCC): ICD-10-CM

## 2019-08-16 DIAGNOSIS — L97.522 DIABETIC ULCER OF OTHER PART OF LEFT FOOT ASSOCIATED WITH DIABETES MELLITUS DUE TO UNDERLYING CONDITION, WITH FAT LAYER EXPOSED (HCC): ICD-10-CM

## 2019-08-16 DIAGNOSIS — L08.9 WOUND INFECTION: ICD-10-CM

## 2019-08-16 DIAGNOSIS — E08.621 DIABETIC ULCER OF OTHER PART OF LEFT FOOT ASSOCIATED WITH DIABETES MELLITUS DUE TO UNDERLYING CONDITION, WITH FAT LAYER EXPOSED (HCC): ICD-10-CM

## 2019-08-16 DIAGNOSIS — L97.523 DIABETIC ULCER OF TOE OF LEFT FOOT ASSOCIATED WITH TYPE 2 DIABETES MELLITUS, WITH NECROSIS OF MUSCLE (HCC): ICD-10-CM

## 2019-08-16 DIAGNOSIS — I73.9 PERIPHERAL ARTERIAL DISEASE (HCC): ICD-10-CM

## 2019-08-16 DIAGNOSIS — E11.621 DIABETIC ULCER OF LEFT MIDFOOT ASSOCIATED WITH TYPE 2 DIABETES MELLITUS, WITH OTHER ULCER SEVERITY (HCC): ICD-10-CM

## 2019-08-16 DIAGNOSIS — E11.65 UNCONTROLLED TYPE 2 DIABETES MELLITUS WITH HYPERGLYCEMIA (HCC): ICD-10-CM

## 2019-08-16 DIAGNOSIS — E11.621 DIABETIC ULCER OF TOE OF LEFT FOOT ASSOCIATED WITH TYPE 2 DIABETES MELLITUS, WITH NECROSIS OF MUSCLE (HCC): ICD-10-CM

## 2019-08-16 DIAGNOSIS — L89.896 PRESSURE INJURY OF DEEP TISSUE OF LEFT FOOT: ICD-10-CM

## 2019-08-16 DIAGNOSIS — T14.8XXA WOUND INFECTION: ICD-10-CM

## 2019-08-16 PROCEDURE — 11043 DBRDMT MUSC&/FSCA 1ST 20/<: CPT

## 2019-08-16 PROCEDURE — 73630 X-RAY EXAM OF FOOT: CPT | Mod: LT

## 2019-08-16 PROCEDURE — 11042 DBRDMT SUBQ TIS 1ST 20SQCM/<: CPT

## 2019-08-16 PROCEDURE — 11043 DBRDMT MUSC&/FSCA 1ST 20/<: CPT | Performed by: NURSE PRACTITIONER

## 2019-08-16 RX ORDER — AMOXICILLIN AND CLAVULANATE POTASSIUM 875; 125 MG/1; MG/1
1 TABLET, FILM COATED ORAL 2 TIMES DAILY
Qty: 28 TAB | Refills: 0 | Status: SHIPPED | OUTPATIENT
Start: 2019-08-16 | End: 2019-08-21

## 2019-08-16 ASSESSMENT — ENCOUNTER SYMPTOMS
SENSORY CHANGE: 1
NAUSEA: 0
DEPRESSION: 0
DIARRHEA: 0
CLAUDICATION: 0
NERVOUS/ANXIOUS: 0
CONSTIPATION: 0
CHILLS: 0
FEVER: 0
COUGH: 0
VOMITING: 0

## 2019-08-17 NOTE — PATIENT INSTRUCTIONS
Should you experience any significant changes in your wound(s) such as infection (redness, swelling, localized heat, increased pain, fever >101 F, chills) or have any questions regarding your home care instructions, please contact the wound center [phone]. If after hours, contact your primary care physician or go the hospital emergency room.  Keep dressing clean and dry and cover while bathing. Only change dressing if over saturated, soiled or its falling off.       Patient to have xray done ASAP. APRN ordered Augmentin to patients pharmacy.  Discussed surgical debridement with patient and family, family to talk it over at home.

## 2019-08-17 NOTE — PROGRESS NOTES
Provider Encounter- Diabetic Foot Ulcer      HISTORY OF PRESENT ILLNESS               START OF CARE IN CLINIC: 2019 after hospitalization, (initial start date 19)               REFERRING PROVIDER: Judy Henry MD               WOUND- Diabetic foot ulcer complicated by PVD              LOCATION: Left dorsomedial foot distal                                   Left dorsomedial foot proximal              Left 2nd and 3rd toes     Left lateral fifth MTH DTI      left lateral proximal fifth metatarsal DTI resolved 2019     Left lateral fifth toe DTI resolved 2019                WOUND HISTORY: Pt reports he was in [place], wore leather shoes that were too tight and developed blisters in 2019. He cleaned ulcers with hydrogen peroxide but the ulcers worsened. He went to urgent care, was referred to wound clinic in May 9, 2019 and arterial studies were ordered that day. He did not complete studies until . Once studies were completed, he was scheduled to be seen by Dr. Graham at the clinic to discuss results however pt went out of town and was not seen until . After seeing Dr. Graham, direct admission was arranged for surgery but pt had difficulty grasping the seriousness of his condition. He did not present to hospital until 19.    He underwent left DP angioplasty, left AT angioplasty and atherectomy, left PT angioplasty on 2019 by Dr. Chatterjee.  He then had a left foot I&D with application of epi-fix and wound VAC by Dr. Chopra on 2019.  He was seen by limb preservation service in the hospital and discharged with wound care follow-up.                PERTINENT PMH: DMII, PVD               [id number].  See below for results              VASCULAR STUDIES:     2019.  See below for results                         LAST  WOUND CULTURE DATE :  2019 RESULTS: Heavy growth MSSA                  OFFLOADING: Offloading shoe    DIABETES HX: Diagnosed with type 2 diabetes 15  years ago, and is currently managing with oral medication.  Checks blood sugars 1-2 times per day and reports that these typically run around 170-180.  Has had previous diabetes education.  He does have some numbness in his feet.  Usually wears slippers or regular shoes. Does not check his feet routinely.   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  TOBACCO USE: He is never used tobacco products      7/5: Initial evaluation and initial provider visit after being in the hospital from 6/21-7/2/19.  Wife reports that when patient has ulcers to his legs they are always dry looking, look similar to how the ulcers look now and believes it is related to his ethnicity.  Education provided about the difference between wounds with adequate blood flow and without adequate blood flow.  Patient and wife are requesting that everything be done in order to save his limb.  He is wearing a sandal instead of his offloading shoe to the left foot.  Blood sugar today was 130.  Authorization placed for acell.  He has already been denied by insurance for epifix.  Continue with wound VAC therapy to promote wound healing.  Has LPS follow-up appointment on 7/12/2019 7/19/2019 : Clinic visit with YAMILKA Penaloza.  Increased slough and eschar in wound, not clean enough for VAC therapy.  VAC put on hold, Medihoney used for autolytic debridement.    7/23/2019 : Clinic visit with YAMILKA Penaloza.  Patient presents again today with his wife.  His current slough in wound or loosening with use of honey.  Continue VAC hold.  Wound culture results obtained from [facility], positive for heavy growth MSSA, Bactrim DS prescribed.    7/26/2019: Clinic visit with Corazon PRATHER.  Slough is slightly looser.  Continue VAC hold.  Blood sugar 118 today.  Forgot to take his Bactrim dose this morning.  Wearing offloading shoe.  Wondering if there is another device that can be used.  Discussed that an offloading boot can be  prescribed.  He has follow-up with LPS rounds next Friday, 8/2/2019.  Wife and patient would like to hold off on switching to an offloading boot until seen on LPS rounds.  Wife mentions that if amputation is recommended, they will seek second opinion in [place].  Patient denies any fevers, chills, nausea, vomiting.  Followed up with Dr. Chatterjee last week and patient reports that MD is happy with his blood flow and reports that the wound is looking great.    7/30/2019 : Clinic visit with YAMILKA Penaloza.  Patient presents today accompanied by his wife and his son.  There is slightly more granulation in the wound bed, however much of the wound remains necrotic.  Still not clean enough for VAC or ACell.  I cautioned patient and his family that he is still at considerable risk for amputation.  He reports his blood sugar today was 112.    8/16: Clinic visit with Corazon PRATHER.  Patient is accompanied by wife and son.  Returns after being out of town for the last 2 weeks.  He was seen during LPS rounds on 8/2/2019 and Dr. Chopra recommended BKA over TMA as soft tissue ulcerations are severely compromised.  Patient and family refusing any sort of amputation.  Referral to hyperbarics and infectious disease was placed as well as renewal of Bactrim DS Rx and BMP to check kidney function.  Son reports that the pharmacy never received the prescription.  Reviewed records and Bactrim DS was sent to patient's correct pharmacy.  Son reports he did not reach out to wound clinic regarding antibiotic Rx.  Patient has also not completed BMP.  Son reports he has not heard from infectious disease.  Referral reviewed and ID has reached out several times to schedule an appointment.  Phone number given to patient.  Son reports that patient will have consultation with hyperbaric therapy [facility] next week.   Son has been applying honey gel therapy and changing the dressing every 4-6 hours to stop the drainage from going back into  patient's wound.  Further education provided on use of honey gel.  He continues to not put any weight to his right foot, using a frontwheel walker and hopping.  Not wearing offloading shoe but only sock.  Patient and family are happy with wound progress.  Patient denies any fevers, chills, nausea, vomiting.    RESULTS:   Most recent A1c:   8.9% on 6/20/2019    X-ray of left foot on 5/17/2019:  Soft tissue ulceration along the first ray has no radiographic evidence of bony destruction to indicate osteomyelitis. If there is high clinical concern, MRI could be performed as is much more sensitive    Mild first metatarsal-phalangeal joint osteoarthritis    Vascular calcifications    Fifth metatarsal-phalangeal centimeters soft tissue swelling is nonspecific, early gout or rheumatoid arthritis could be considered      Arterial studies on 5/24/2019  ALYSSA's           RIGHT     Waveform            Systolic BPs (mmHg)                              143           Brachial   Triphasic                                Common Femoral   Monophasic                 109           Posterior Tibial   Monophasic                 100           Dorsalis Pedis                                            Peroneal                              0.76          ALYSSA                                            TBI                          LEFT   Waveform        Systolic BPs (mmHg)                              142           Brachial   Triphasic                                Common Femoral   Monophasic                 124           Posterior Tibial   Monophasic                 113           Dorsalis Pedis                                            Peroneal                              0.87          ALYSSA                                            TBI  Conclusions   1.  Apparent tibial artery stenosis and possible occlusion bilaterally.    2.  Toe pressure indicate severe arteriial occlusive disease and suggest    the possibility of artifically elevated ankle  brachial indices.    Arterial duplex imaging  CONCLUSIONS   1. Apparent normal arterial perfusion to the bilateral popliteal arteries.   2.  Tandem lesions in the right posterior tibial artery with short segment    occlusion and reconsituted flow in the distal artery.    3.  Distal occlusion of both the anterior tibial and peroneal artery.     4.  Left posterior tibial and peroneal artery occlusion with reconstitution    of the distal vessel.     5.  The left anterior tibial artery has tandem significant stenoses.    PAST MEDICAL HISTORY:   Past Medical History:   Diagnosis Date   • Diabetes (HCC)      PAST SURGICAL HISTORY:   Past Surgical History:   Procedure Laterality Date   • IRRIGATION & DEBRIDEMENT ORTHO Left 6/24/2019    Procedure: IRRIGATION AND DEBRIDEMENT, WOUND-FOOT, BIOLOGIC PLACEMENT, WOUND VAC PLACEMENT;  Surgeon: Charles Chopra M.D.;  Location: SURGERY [facility];  Service: Orthopedics        MEDICATIONS:   Current Outpatient Medications   Medication   • amoxicillin-clavulanate (AUGMENTIN) 875-125 MG Tab   • insulin glargine (BASAGLAR KWIKPEN) 100 UNIT/ML Solution Pen-injector injection   • Insulin Pen Needle 32 G x 4 mm   • glipiZIDE (GLUCOTROL) 5 MG Tab   • aspirin 81 MG EC tablet   • atorvastatin (LIPITOR) 40 MG Tab     No current facility-administered medications for this visit.        ALLERGIES:  No Known Allergies      SOCIAL HISTORY:   Social History     Socioeconomic History   • Marital status:      Spouse name: Not on file   • Number of children: Not on file   • Years of education: Not on file   • Highest education level: Not on file   Occupational History   • Not on file   Social Needs   • Financial resource strain: Not on file   • Food insecurity:     Worry: Not on file     Inability: Not on file   • Transportation needs:     Medical: Not on file     Non-medical: Not on file   Tobacco Use   • Smoking status: Never Smoker   • Smokeless tobacco: Never Used   Substance and  Sexual Activity   • Alcohol use: No   • Drug use: No   • Sexual activity: Not on file   Lifestyle   • Physical activity:     Days per week: Not on file     Minutes per session: Not on file   • Stress: Not on file   Relationships   • Social connections:     Talks on phone: Not on file     Gets together: Not on file     Attends [employer] service: Not on file     Active member of club or organization: Not on file     Attends meetings of clubs or organizations: Not on file     Relationship status: Not on file   • Intimate partner violence:     Fear of current or ex partner: Not on file     Emotionally abused: Not on file     Physically abused: Not on file     Forced sexual activity: Not on file   Other Topics Concern   • Not on file   Social History Narrative   • Not on file       FAMILY HISTORY: No family history on file.     REVIEW OF SYSTEMS:   Review of Systems   Constitutional: Negative for chills and fever.   Respiratory: Negative for cough.    Cardiovascular: Negative for chest pain and claudication.        Less pain to left lower leg since vascular procedure   Gastrointestinal: Negative for constipation, diarrhea, nausea and vomiting.   Genitourinary: Negative for dysuria.   Musculoskeletal: Negative for joint pain.   Skin: Negative for rash.        Wounds to left dorsal foot since early May 2019, started as large blisters.   Neurological: Positive for sensory change.        Numbness in both feet   Psychiatric/Behavioral: Negative for depression. The patient is not nervous/anxious.        PHYSICAL EXAMINATION:   /86   Pulse (!) 105   Temp 36.8 °C (98.2 °F)   Resp 18   SpO2 97%     Physical Exam   Constitutional: He is oriented to person, place, and time and well-developed, well-nourished, and in no distress.   HENT:   Head: Normocephalic.   Eyes: Pupils are equal, round, and reactive to light.   Neck: Normal range of motion. Neck supple. No JVD present.   Cardiovascular: Normal rate and regular rhythm.    Palpable femoral and popliteal arteries bilaterally.   Unable to palpate pedal pulses in left foot  With Doppler biphasic tones noted to DP and PT.  DP tone slightly sluggish   Pulmonary/Chest: Effort normal. No respiratory distress.   Abdominal: Soft. He exhibits no distension.   Musculoskeletal: He exhibits edema (LLE greater than RLE).   Neurological: He is alert and oriented to person, place, and time.   Both feet insensate to light touch   Skin: Skin is warm.   Left dorsal distal first MTH ulcer with white slough resembling zinc paste and eschar connecting to proximal first metatarsal ulcer  No pink tissue identified.  Scant amount of new epithelium noted to proximal aspect and dorsal second metatarsal  Left second toe: eschar soft, no drainage  Left third dorsal toe: eschar dry black  Left lateral fifth MTH DTI: Partially lifted.  Intact skin underneath  Left lateral proximal fifth metatarsal DTI: Resolved   left lateral fifth toe DTI: Resolved   refer to wound flowsheet and photos   Psychiatric: Mood, memory, affect and judgment normal.       WOUND ASSESSMENT    Refer to flowsheet by RN for further detail      Wound photos  Left dorsal medial foot, proximal and distal wound.  Pre-debridement                Left dorsal second toe, no debridement today                Left lateral third toe, no debridement today                Left lateral fifth MTH, fifth proximal metatarsal, fifth toe, no debridement today                Procedure: Excisional debridement of left dorsal/ medial foot  -2% viscous lidocaine applied topically to wound bed for approximately 15 minutes prior to debridement  -Curette, scissors, and forceps used to excise slough and eschar from wound bed.  Excisional debridement was performed to remove devitalized tissue until  bleeding tissue was visualized.  Unable to establish 100% clean wound bed due to patient discomfort and adherence of eschar.  Total area debrided approximately 15 cm².   Deepest level of debridement was into the muscle layer.  -Bleeding controlled with manual pressure.   -Wound cleansed with normal saline, wound care completed per orders, by Gabino Jefferson RN-refer to wound flowsheet    Betadine applied to eschar of second and third toes  DTI injuries left open to air      Post debridement photo  Left dorsal medial foot, proximal and distal wound.  Post debridement  Omitted by RN                PATIENT EDUCATION  - Importance of tight glucose control for wound healing   - Implications of loss of protective sensation (LOPS) discussed with patient- including increased risk for amputation.  - Advised to check feet at least daily, moisturize feet, and to always wear protective foot wear.   -  Importance of offloading foot to assist with wound healing  - Advised pt not to trim nails or calluses, seek foot/nail care from podiatrist or certified foot/nail nurse  - Importance of adequate nutrition for wound healing  - Increase protein intake (unless contraindicated by renal status)    ASSESSMENT AND PLAN:     1. Diabetic ulcer of left midfoot associated with type 2 diabetes mellitus, with other ulcer severity (HCC)  Comment: Necrotic wounds x2 to dorsum of left foot.  Present since 3/2019, started as blisters.  S/P revascularization procedure on 6/21 and left foot I&D, epi fix and wound VAC application by Dr. Chopra on 6/24/2019 8/16: Returns after 2-week absence.  Eschar to proximal foot with thick white slough.  Forefoot has a less woody appearance    Limb preservation status highly guarded, discussed with patient, wife, son. Patient and family refusing any kind of amputation.   -Excisional debridement of wound in clinic today, medically necessary to promote wound healing.  - Discontinue honey gel switched to dressing that is more conducive to frequent dressing changes as this is family's preference.  Hydrogel applied to wound bed to provide moist wound environment, autolytic  debridement.  Dressing can be changed twice daily to daily.  Recommend dressing be changed daily.  Education provided to son that gel will further debride ulcer through autolytic debridement.  -Offered to order supplies through [facility] however son refused.  He would like to revisit this at patient's next appointment on Tuesday.  - Consider Puracyn gel or Silvadene which can both be applied twice daily. will provide antimicrobial benefits and autolytic debridement    -recommend I&D to left dorsal foot ulceration with vera liu VAC therapy as patient is unable to tolerate significant debridement at clinic visit.  Patient and family quite hesitant to have I&D as they are under the impression this means amputation.  Reassured and clarified that this is not the case.  Also has pain to left first MTH.  X-ray left foot ordered to rule out osteomyelitis     Wound care: Hydrogel, to promote autolytic debridement, Adaptic to maintain moisture, ABD pad to absorb exudate, roll gauze to secure dressing    2. Uncontrolled type 2 diabetes mellitus with hyperglycemia (HCC)  Comments: Most recent A1c 8.9 % on 6/28/2019 8/16: Blood sugars consistently below 150. Seen by diabetes educator and dietitian in hospital.  Patient is vegetarian.  Understands need for adequate protein intake to promote wound healing.  -Adverse effects of hyperglycemia on wound healing and general health discussed with patient  -He is encouraged to keep his blood sugars below 150 in order to optimize chances for wound healing.    3.  PAD  Comments: Arterial studies were ordered on patient's initial clinic visit, 5/9/2019.  Not completed until 5/24/2019.  S/P left DP angioplasty, left AT angioplasty and atherectomy, left PT angioplasty on 6/21/2019 by Dr. Chatterjee.    8/16 has follow-up with Dr. Chatterjee next week.  Patient has shown slight signs of healing including resolution of fifth proximal metatarsal and fifth toe deep tissue injury.  Foot with decreased wooden  appearance.  Foot warm.  DP, PT not palpable, .  With Doppler PT biphasic, DP monophasic  -Check pulses with Doppler at each visit.      4. Diabetic polyneuropathy associated with type 2 diabetes mellitus (HCC)    8/16: Complicating factor.  Patient to continue with nonweightbearing to LLE with use of front wheel walker.  Protect foot with closed toe, closed heel shoe.     5. Wound infection  Previous wound culture results obtained from [facility], positive for heavy growth MSSA, sensitive to Bactrim-10-day course of Bactrim prescribed on 7/23.    8/16: Seen during LPS rounds 8/2 and renewal of Bactrim DS for 10 days ordered, BMP ordered to check kidney function due to Bactrim use.  Referral to ID was also placed.  Son reports they never received the prescription from pharmacy.  Went to [facility] for 2 weeks.  Son also reports he has not heard back from infectious disease.  Referral reviewed and ID has made several attempts to schedule appointment. Phone number given to patient and son to schedule.   -Minimal foul odor noted with debridement.    Rx for Augmentin for 14 days sent to pharmacy on file to treat aerobic/anaerobic bacteria  -Reviewed with patient and family the importance of contacting wound clinic if antibiotic prescription is not received.  Verbalized understanding  -Explained seriousness of condition.  Patient at high risk for osteomyelitis as majority of foot structures are exposed/compromised.  X-ray of left foot ordered rule out OM  .-Patient advised to go to emergency room if he expenses increased erythema, edema, and or if he experiences fevers, chills, nausea, vomiting.    6.  Diabetic ulcer of toe of left foot associated with type 2 diabetes mellitus, with necrosis of muscle  Developed ulcer to left second and third toe   8/16:  eschar to L 2nd beginning to soften.  Left third toe eschar remains dry.  -Reinforced the importance of continued Betadine use to keep eschar dry and  provide antimicrobial effect.    -Continue to paint with Betadine    7.  Pressure injury of deep tissue of left foot  Deep tissue injury to lateral left fifth MTH, lateral left fifth toe, and to left proximal fifth metatarsal.    8/16: Left proximal fifth metatarsal and left fifth toe DTI are resolved  Left fifth MTH ulcer is partially resolved.  Keep dry.  Open air.  Offload foot from unnecessary pressure  Continue nonweightbearing to LLE    Please note that this dictation was created using voice recognition software. I have worked with technical experts from [facility] to optimize the interface.  I have made every reasonable attempt to correct obvious errors, but there may be errors of grammar and possibly content that I did not discover before finalizing the note.

## 2019-08-20 ENCOUNTER — DOCUMENTATION (OUTPATIENT)
Dept: WOUND CARE | Facility: MEDICAL CENTER | Age: 54
End: 2019-08-20

## 2019-08-20 ENCOUNTER — APPOINTMENT (OUTPATIENT)
Dept: WOUND CARE | Facility: MEDICAL CENTER | Age: 54
End: 2019-08-20
Attending: PHYSICIAN ASSISTANT
Payer: COMMERCIAL

## 2019-08-21 ENCOUNTER — HOSPITAL ENCOUNTER (INPATIENT)
Facility: MEDICAL CENTER | Age: 54
LOS: 6 days | DRG: 638 | End: 2019-08-27
Attending: EMERGENCY MEDICINE | Admitting: FAMILY MEDICINE
Payer: COMMERCIAL

## 2019-08-21 ENCOUNTER — APPOINTMENT (OUTPATIENT)
Dept: RADIOLOGY | Facility: MEDICAL CENTER | Age: 54
DRG: 638 | End: 2019-08-21
Attending: FAMILY MEDICINE
Payer: COMMERCIAL

## 2019-08-21 ENCOUNTER — APPOINTMENT (OUTPATIENT)
Dept: RADIOLOGY | Facility: MEDICAL CENTER | Age: 54
DRG: 638 | End: 2019-08-21
Attending: EMERGENCY MEDICINE
Payer: COMMERCIAL

## 2019-08-21 DIAGNOSIS — E11.8 DIABETIC FOOT (HCC): ICD-10-CM

## 2019-08-21 DIAGNOSIS — L08.9 WOUND INFECTION: ICD-10-CM

## 2019-08-21 DIAGNOSIS — M86.9 OSTEOMYELITIS OF LEFT FOOT, UNSPECIFIED TYPE (HCC): ICD-10-CM

## 2019-08-21 DIAGNOSIS — E11.621 DIABETIC ULCER OF TOE OF LEFT FOOT ASSOCIATED WITH TYPE 2 DIABETES MELLITUS, WITH NECROSIS OF MUSCLE (HCC): ICD-10-CM

## 2019-08-21 DIAGNOSIS — T14.8XXA WOUND INFECTION: ICD-10-CM

## 2019-08-21 DIAGNOSIS — L97.523 DIABETIC ULCER OF TOE OF LEFT FOOT ASSOCIATED WITH TYPE 2 DIABETES MELLITUS, WITH NECROSIS OF MUSCLE (HCC): ICD-10-CM

## 2019-08-21 DIAGNOSIS — I73.9 PERIPHERAL ARTERIAL DISEASE (HCC): ICD-10-CM

## 2019-08-21 DIAGNOSIS — L89.896 PRESSURE INJURY OF DEEP TISSUE OF LEFT FOOT: ICD-10-CM

## 2019-08-21 PROBLEM — D64.9 NORMOCYTIC ANEMIA: Status: ACTIVE | Noted: 2019-08-21

## 2019-08-21 LAB
ANION GAP SERPL CALC-SCNC: 10 MMOL/L (ref 0–11.9)
APTT PPP: 30.6 SEC (ref 24.7–36)
BASOPHILS # BLD AUTO: 0.3 % (ref 0–1.8)
BASOPHILS # BLD: 0.02 K/UL (ref 0–0.12)
BUN SERPL-MCNC: 16 MG/DL (ref 8–22)
CALCIUM SERPL-MCNC: 9.2 MG/DL (ref 8.5–10.5)
CHLORIDE SERPL-SCNC: 101 MMOL/L (ref 96–112)
CO2 SERPL-SCNC: 25 MMOL/L (ref 20–33)
CREAT SERPL-MCNC: 0.75 MG/DL (ref 0.5–1.4)
CRP SERPL HS-MCNC: 1.22 MG/DL (ref 0–0.75)
EOSINOPHIL # BLD AUTO: 0.03 K/UL (ref 0–0.51)
EOSINOPHIL NFR BLD: 0.4 % (ref 0–6.9)
ERYTHROCYTE [DISTWIDTH] IN BLOOD BY AUTOMATED COUNT: 45 FL (ref 35.9–50)
ERYTHROCYTE [SEDIMENTATION RATE] IN BLOOD BY WESTERGREN METHOD: 71 MM/HOUR (ref 0–20)
GLUCOSE BLD-MCNC: 136 MG/DL (ref 65–99)
GLUCOSE BLD-MCNC: 141 MG/DL (ref 65–99)
GLUCOSE SERPL-MCNC: 223 MG/DL (ref 65–99)
HCT VFR BLD AUTO: 36 % (ref 42–52)
HGB BLD-MCNC: 11.8 G/DL (ref 14–18)
IMM GRANULOCYTES # BLD AUTO: 0.01 K/UL (ref 0–0.11)
IMM GRANULOCYTES NFR BLD AUTO: 0.1 % (ref 0–0.9)
INR PPP: 0.97 (ref 0.87–1.13)
LYMPHOCYTES # BLD AUTO: 1.38 K/UL (ref 1–4.8)
LYMPHOCYTES NFR BLD: 18.3 % (ref 22–41)
MCH RBC QN AUTO: 29.9 PG (ref 27–33)
MCHC RBC AUTO-ENTMCNC: 32.8 G/DL (ref 33.7–35.3)
MCV RBC AUTO: 91.4 FL (ref 81.4–97.8)
MONOCYTES # BLD AUTO: 0.45 K/UL (ref 0–0.85)
MONOCYTES NFR BLD AUTO: 6 % (ref 0–13.4)
NEUTROPHILS # BLD AUTO: 5.65 K/UL (ref 1.82–7.42)
NEUTROPHILS NFR BLD: 74.9 % (ref 44–72)
NRBC # BLD AUTO: 0 K/UL
NRBC BLD-RTO: 0 /100 WBC
PLATELET # BLD AUTO: 244 K/UL (ref 164–446)
PMV BLD AUTO: 10 FL (ref 9–12.9)
POTASSIUM SERPL-SCNC: 4.4 MMOL/L (ref 3.6–5.5)
PROTHROMBIN TIME: 13.1 SEC (ref 12–14.6)
RBC # BLD AUTO: 3.94 M/UL (ref 4.7–6.1)
SODIUM SERPL-SCNC: 136 MMOL/L (ref 135–145)
WBC # BLD AUTO: 7.5 K/UL (ref 4.8–10.8)

## 2019-08-21 PROCEDURE — 700117 HCHG RX CONTRAST REV CODE 255: Performed by: FAMILY MEDICINE

## 2019-08-21 PROCEDURE — 85730 THROMBOPLASTIN TIME PARTIAL: CPT

## 2019-08-21 PROCEDURE — 85652 RBC SED RATE AUTOMATED: CPT

## 2019-08-21 PROCEDURE — 99221 1ST HOSP IP/OBS SF/LOW 40: CPT | Performed by: FAMILY MEDICINE

## 2019-08-21 PROCEDURE — 80048 BASIC METABOLIC PNL TOTAL CA: CPT

## 2019-08-21 PROCEDURE — 36415 COLL VENOUS BLD VENIPUNCTURE: CPT

## 2019-08-21 PROCEDURE — 85610 PROTHROMBIN TIME: CPT

## 2019-08-21 PROCEDURE — 99285 EMERGENCY DEPT VISIT HI MDM: CPT

## 2019-08-21 PROCEDURE — 73720 MRI LWR EXTREMITY W/O&W/DYE: CPT | Mod: LT

## 2019-08-21 PROCEDURE — 700102 HCHG RX REV CODE 250 W/ 637 OVERRIDE(OP): Performed by: FAMILY MEDICINE

## 2019-08-21 PROCEDURE — 700105 HCHG RX REV CODE 258: Performed by: FAMILY MEDICINE

## 2019-08-21 PROCEDURE — 770006 HCHG ROOM/CARE - MED/SURG/GYN SEMI*

## 2019-08-21 PROCEDURE — 82962 GLUCOSE BLOOD TEST: CPT

## 2019-08-21 PROCEDURE — 73630 X-RAY EXAM OF FOOT: CPT | Mod: LT

## 2019-08-21 PROCEDURE — 86140 C-REACTIVE PROTEIN: CPT

## 2019-08-21 PROCEDURE — 96365 THER/PROPH/DIAG IV INF INIT: CPT

## 2019-08-21 PROCEDURE — A9270 NON-COVERED ITEM OR SERVICE: HCPCS | Performed by: FAMILY MEDICINE

## 2019-08-21 PROCEDURE — 700111 HCHG RX REV CODE 636 W/ 250 OVERRIDE (IP): Performed by: FAMILY MEDICINE

## 2019-08-21 PROCEDURE — 85025 COMPLETE CBC W/AUTO DIFF WBC: CPT

## 2019-08-21 RX ORDER — PROCHLORPERAZINE EDISYLATE 5 MG/ML
5-10 INJECTION INTRAMUSCULAR; INTRAVENOUS EVERY 4 HOURS PRN
Status: DISCONTINUED | OUTPATIENT
Start: 2019-08-21 | End: 2019-08-27 | Stop reason: HOSPADM

## 2019-08-21 RX ORDER — CLOPIDOGREL BISULFATE 75 MG/1
75 TABLET ORAL
Refills: 2 | Status: ON HOLD | COMMUNITY
Start: 2019-08-13 | End: 2020-01-10 | Stop reason: SDUPTHER

## 2019-08-21 RX ORDER — ONDANSETRON 2 MG/ML
4 INJECTION INTRAMUSCULAR; INTRAVENOUS EVERY 4 HOURS PRN
Status: DISCONTINUED | OUTPATIENT
Start: 2019-08-21 | End: 2019-08-27 | Stop reason: HOSPADM

## 2019-08-21 RX ORDER — PROMETHAZINE HYDROCHLORIDE 25 MG/1
12.5-25 TABLET ORAL EVERY 4 HOURS PRN
Status: DISCONTINUED | OUTPATIENT
Start: 2019-08-21 | End: 2019-08-27 | Stop reason: HOSPADM

## 2019-08-21 RX ORDER — SODIUM CHLORIDE 9 MG/ML
INJECTION, SOLUTION INTRAVENOUS
Status: COMPLETED
Start: 2019-08-21 | End: 2019-08-22

## 2019-08-21 RX ORDER — CLOPIDOGREL BISULFATE 75 MG/1
75 TABLET ORAL
Status: DISCONTINUED | OUTPATIENT
Start: 2019-08-22 | End: 2019-08-22

## 2019-08-21 RX ORDER — AMOXICILLIN 250 MG
2 CAPSULE ORAL 2 TIMES DAILY
Status: DISCONTINUED | OUTPATIENT
Start: 2019-08-22 | End: 2019-08-27 | Stop reason: HOSPADM

## 2019-08-21 RX ORDER — POLYETHYLENE GLYCOL 3350 17 G/17G
1 POWDER, FOR SOLUTION ORAL
Status: DISCONTINUED | OUTPATIENT
Start: 2019-08-21 | End: 2019-08-27 | Stop reason: HOSPADM

## 2019-08-21 RX ORDER — GLIPIZIDE 5 MG/1
5 TABLET ORAL 2 TIMES DAILY
Status: DISCONTINUED | OUTPATIENT
Start: 2019-08-22 | End: 2019-08-22

## 2019-08-21 RX ORDER — ATORVASTATIN CALCIUM 40 MG/1
40 TABLET, FILM COATED ORAL NIGHTLY
Status: ON HOLD | COMMUNITY
End: 2019-12-27

## 2019-08-21 RX ORDER — AMOXICILLIN AND CLAVULANATE POTASSIUM 875; 125 MG/1; MG/1
1 TABLET, FILM COATED ORAL 2 TIMES DAILY
Status: ON HOLD | COMMUNITY
Start: 2019-08-16 | End: 2019-08-27

## 2019-08-21 RX ORDER — ONDANSETRON 4 MG/1
4 TABLET, ORALLY DISINTEGRATING ORAL EVERY 4 HOURS PRN
Status: DISCONTINUED | OUTPATIENT
Start: 2019-08-21 | End: 2019-08-27 | Stop reason: HOSPADM

## 2019-08-21 RX ORDER — ACETAMINOPHEN 325 MG/1
650 TABLET ORAL EVERY 6 HOURS PRN
Status: DISCONTINUED | OUTPATIENT
Start: 2019-08-21 | End: 2019-08-27 | Stop reason: HOSPADM

## 2019-08-21 RX ORDER — OXYCODONE HYDROCHLORIDE 5 MG/1
5 TABLET ORAL EVERY 4 HOURS PRN
Status: DISCONTINUED | OUTPATIENT
Start: 2019-08-21 | End: 2019-08-27 | Stop reason: HOSPADM

## 2019-08-21 RX ORDER — BISACODYL 10 MG
10 SUPPOSITORY, RECTAL RECTAL
Status: DISCONTINUED | OUTPATIENT
Start: 2019-08-21 | End: 2019-08-27 | Stop reason: HOSPADM

## 2019-08-21 RX ORDER — PROMETHAZINE HYDROCHLORIDE 12.5 MG/1
12.5-25 SUPPOSITORY RECTAL EVERY 4 HOURS PRN
Status: DISCONTINUED | OUTPATIENT
Start: 2019-08-21 | End: 2019-08-27 | Stop reason: HOSPADM

## 2019-08-21 RX ORDER — ENALAPRILAT 1.25 MG/ML
1.25 INJECTION INTRAVENOUS EVERY 6 HOURS PRN
Status: DISCONTINUED | OUTPATIENT
Start: 2019-08-21 | End: 2019-08-27 | Stop reason: HOSPADM

## 2019-08-21 RX ORDER — ATORVASTATIN CALCIUM 20 MG/1
40 TABLET, FILM COATED ORAL NIGHTLY
Status: DISCONTINUED | OUTPATIENT
Start: 2019-08-22 | End: 2019-08-22

## 2019-08-21 RX ADMIN — VANCOMYCIN HYDROCHLORIDE 1900 MG: 500 INJECTION, POWDER, LYOPHILIZED, FOR SOLUTION INTRAVENOUS at 19:49

## 2019-08-21 RX ADMIN — PIPERACILLIN AND TAZOBACTAM 4.5 G: 4; .5 INJECTION, POWDER, LYOPHILIZED, FOR SOLUTION INTRAVENOUS; PARENTERAL at 18:23

## 2019-08-21 RX ADMIN — ATORVASTATIN CALCIUM 40 MG: 20 TABLET, FILM COATED ORAL at 23:59

## 2019-08-21 RX ADMIN — PIPERACILLIN AND TAZOBACTAM 4.5 G: 4; .5 INJECTION, POWDER, LYOPHILIZED, FOR SOLUTION INTRAVENOUS; PARENTERAL at 23:27

## 2019-08-21 RX ADMIN — CLOPIDOGREL BISULFATE 75 MG: 75 TABLET ORAL at 23:58

## 2019-08-21 RX ADMIN — GADOTERIDOL 15 ML: 279.3 INJECTION, SOLUTION INTRAVENOUS at 22:22

## 2019-08-21 ASSESSMENT — LIFESTYLE VARIABLES
EVER FELT BAD OR GUILTY ABOUT YOUR DRINKING: NO
HAVE YOU EVER FELT YOU SHOULD CUT DOWN ON YOUR DRINKING: NO
TOTAL SCORE: 0
AVERAGE NUMBER OF DAYS PER WEEK YOU HAVE A DRINK CONTAINING ALCOHOL: 0
TOTAL SCORE: 0
SUBSTANCE_ABUSE: 0
EVER HAD A DRINK FIRST THING IN THE MORNING TO STEADY YOUR NERVES TO GET RID OF A HANGOVER: NO
ON A TYPICAL DAY WHEN YOU DRINK ALCOHOL HOW MANY DRINKS DO YOU HAVE: 0
HOW MANY TIMES IN THE PAST YEAR HAVE YOU HAD 5 OR MORE DRINKS IN A DAY: 0
HAVE PEOPLE ANNOYED YOU BY CRITICIZING YOUR DRINKING: NO
TOTAL SCORE: 0
ALCOHOL_USE: NO
CONSUMPTION TOTAL: NEGATIVE

## 2019-08-21 ASSESSMENT — PATIENT HEALTH QUESTIONNAIRE - PHQ9
1. LITTLE INTEREST OR PLEASURE IN DOING THINGS: NOT AT ALL
1. LITTLE INTEREST OR PLEASURE IN DOING THINGS: NOT AT ALL
SUM OF ALL RESPONSES TO PHQ9 QUESTIONS 1 AND 2: 0
2. FEELING DOWN, DEPRESSED, IRRITABLE, OR HOPELESS: NOT AT ALL
SUM OF ALL RESPONSES TO PHQ9 QUESTIONS 1 AND 2: 0
2. FEELING DOWN, DEPRESSED, IRRITABLE, OR HOPELESS: NOT AT ALL

## 2019-08-21 ASSESSMENT — ENCOUNTER SYMPTOMS
DEPRESSION: 0
COUGH: 0
HEMOPTYSIS: 0
FEVER: 0
PHOTOPHOBIA: 0
MYALGIAS: 0
DIZZINESS: 0
HEADACHES: 0
DOUBLE VISION: 0
PALPITATIONS: 0
HEARTBURN: 0
NAUSEA: 0
TINGLING: 0
CHILLS: 0
BLURRED VISION: 0
ORTHOPNEA: 0

## 2019-08-21 ASSESSMENT — COGNITIVE AND FUNCTIONAL STATUS - GENERAL
SUGGESTED CMS G CODE MODIFIER MOBILITY: CJ
HELP NEEDED FOR BATHING: A LITTLE
WALKING IN HOSPITAL ROOM: A LITTLE
CLIMB 3 TO 5 STEPS WITH RAILING: A LITTLE
STANDING UP FROM CHAIR USING ARMS: A LITTLE
DRESSING REGULAR LOWER BODY CLOTHING: A LITTLE
SUGGESTED CMS G CODE MODIFIER DAILY ACTIVITY: CJ
MOBILITY SCORE: 20
DAILY ACTIVITIY SCORE: 21
MOVING FROM LYING ON BACK TO SITTING ON SIDE OF FLAT BED: A LITTLE
TOILETING: A LITTLE

## 2019-08-21 NOTE — PROGRESS NOTES
Contacted patient regarding L foot x-ray results indicating osteomyelitis to first MTH.  Pt has left dorsal medial foot ulcer with exposed tendon/ structures, L 2nd and 3rd dorsal toe ulcer, L 5th MTH ulcer. Patient asked that I speak with his son JOSE and gave me permission to speak to son regarding his foot.    Son relayed that they picked up the Augmentin and the family noticed patient had increased edema and erythema to his foot as soon as he started to take the antibiotics on 8/16.  He continued with the antibiotics until 8/18 when he discontinued after talking with the family self determined the reason the foot had erythema and edema was because of the antibiotics. Son denied pt having any erythema or edema to anywhere else to his body. Also, he did not experience any side effects including rash, pruritus, nausea, vomiting, diarrhea or anaphylactic reaction.  He felt that pt has been on antibiotics for last 2 months without any difference. reviewed past antibiotics prescribed with son since May 2019.    Phone number was given to pt last Friday to schedule appointment with ID. Son stated they have not called.       Son reports that since patient has stopped the antibiotic on 8/18, the foot has decreased in swelling and is still red but then later corrected himself and said that the foot is now normal and not red.  The son reports that the patient has not experienced any sort of fevers or chills or nausea or vomiting or diarrhea.    I indicated to patient that the x-ray results were indicative of osteomyelitis and the fact that patient is now showing signs of cellulitis that he proceed to the emergency department to be admitted.  Reviewed risks of not being treated including sepsis, bacteremia which if left untreated could lead to death.  I again highly recommended that patient proceed to ED to be admitted and seen by infectious disease, orthopedics, vascular surgery.  Son stated he would relay this information to  pt and his mother.  I offered to stay on the phone while he discussed with family but he declined stating that he will talk to them in private and immediately ended the conversation.    Additionally reviewed imaging with Dr. Sepulveda who also concurs with result.

## 2019-08-21 NOTE — ED TRIAGE NOTES
Pt amb to triage.  Chief Complaint   Patient presents with   • Wound Check     left foot, medial side     Pt states he has been seeing wound care, had an abnormal xray and was sent to ED to r/o infection.

## 2019-08-21 NOTE — ED PROVIDER NOTES
ED Provider Note    Scribed for Husam Graham M.D. by Kali Oconnor. 8/21/2019  11:39 AM    Primary care provider: No primary care provider on file.  Means of arrival: Patient  History obtained from: Walk-in  History limited by: None    CHIEF COMPLAINT  Chief Complaint   Patient presents with   • Wound Check     left foot, medial side       HPI  Israel Aviles is a 53 y.o. male who presents to the Emergency Department for a wound check on his left foot. The patient states that he is being followed by wound care. He had an abnormal x-ray and was sent here. He states that the wound has improved since its initial onset. He denies any increasing pain to the area or fever. The patient does not state any modifying or alleviating factors.     REVIEW OF SYSTEMS  Pertinent positives include wound on left foot.  Pertinent negatives include no increasing pain to the area or fever.    All other systems reviewed and negative. See HPI for further details.     PAST MEDICAL HISTORY   has a past medical history of Diabetes (HCC).    SURGICAL HISTORY   has a past surgical history that includes irrigation & debridement ortho (Left, 6/24/2019).    SOCIAL HISTORY  Social History     Tobacco Use   • Smoking status: Never Smoker   • Smokeless tobacco: Never Used   Substance Use Topics   • Alcohol use: No   • Drug use: No      Social History     Substance and Sexual Activity   Drug Use No       FAMILY HISTORY  No family history on file.    CURRENT MEDICATIONS  Home Medications     Reviewed by Chucky Orona R.N. (Registered Nurse) on 08/21/19 at 1125  Med List Status: Partial   Medication Last Dose Status   amoxicillin-clavulanate (AUGMENTIN) 875-125 MG Tab  Active   aspirin 81 MG EC tablet  Active   atorvastatin (LIPITOR) 40 MG Tab  Active   glipiZIDE (GLUCOTROL) 5 MG Tab  Active   insulin glargine (BASAGLAR KWIKPEN) 100 UNIT/ML Solution Pen-injector injection  Active   Insulin Pen Needle 32 G x 4 mm  Active                 ALLERGIES  No Known Allergies    PHYSICAL EXAM  VITAL SIGNS: /76   Pulse (!) 108   Temp 36.4 °C (97.5 °F) (Temporal)   Resp 18   Wt 75 kg (165 lb 5.5 oz)   SpO2 97%   BMI 22.42 kg/m²     Nursing note and vitals reviewed.  Constitutional: Well-developed and well-nourished. No distress.   HENT: Head is normocephalic and atraumatic. Oropharynx is clear and moist without exudate or erythema.   Eyes: Pupils are equal, round, and reactive to light. Conjunctiva are normal.   Cardiovascular: Normal rate and regular rhythm. No murmur heard. Normal radial pulses.  Pulmonary/Chest: Breath sounds normal. No wheezes or rales.   Abdominal: Soft and non-tender. No distention    Musculoskeletal: Extremities exhibit normal range of motion without edema or tenderness.   Neurological: Awake, alert and oriented to person, place, and time. No focal deficits noted.  Skin: Large wound to medial aspect on dorsum of right foot 5cm x 10cm with minimally surrounding erythema. There is scant soft tissue overlying the distal first metatarsal. Skin is warm and dry. No rash.   Psychiatric: Normal mood and affect. Appropriate.     DIAGNOSTIC STUDIES / PROCEDURES    LABS  Results for orders placed or performed during the hospital encounter of 08/21/19   CBC WITH DIFFERENTIAL   Result Value Ref Range    WBC 7.5 4.8 - 10.8 K/uL    RBC 3.94 (L) 4.70 - 6.10 M/uL    Hemoglobin 11.8 (L) 14.0 - 18.0 g/dL    Hematocrit 36.0 (L) 42.0 - 52.0 %    MCV 91.4 81.4 - 97.8 fL    MCH 29.9 27.0 - 33.0 pg    MCHC 32.8 (L) 33.7 - 35.3 g/dL    RDW 45.0 35.9 - 50.0 fL    Platelet Count 244 164 - 446 K/uL    MPV 10.0 9.0 - 12.9 fL    Neutrophils-Polys 74.90 (H) 44.00 - 72.00 %    Lymphocytes 18.30 (L) 22.00 - 41.00 %    Monocytes 6.00 0.00 - 13.40 %    Eosinophils 0.40 0.00 - 6.90 %    Basophils 0.30 0.00 - 1.80 %    Immature Granulocytes 0.10 0.00 - 0.90 %    Nucleated RBC 0.00 /100 WBC    Neutrophils (Absolute) 5.65 1.82 - 7.42 K/uL    Lymphs  (Absolute) 1.38 1.00 - 4.80 K/uL    Monos (Absolute) 0.45 0.00 - 0.85 K/uL    Eos (Absolute) 0.03 0.00 - 0.51 K/uL    Baso (Absolute) 0.02 0.00 - 0.12 K/uL    Immature Granulocytes (abs) 0.01 0.00 - 0.11 K/uL    NRBC (Absolute) 0.00 K/uL   BASIC METABOLIC PANEL   Result Value Ref Range    Sodium 136 135 - 145 mmol/L    Potassium 4.4 3.6 - 5.5 mmol/L    Chloride 101 96 - 112 mmol/L    Co2 25 20 - 33 mmol/L    Glucose 223 (H) 65 - 99 mg/dL    Bun 16 8 - 22 mg/dL    Creatinine 0.75 0.50 - 1.40 mg/dL    Calcium 9.2 8.5 - 10.5 mg/dL    Anion Gap 10.0 0.0 - 11.9   PROTHROMBIN TIME (INR)   Result Value Ref Range    PT 13.1 12.0 - 14.6 sec    INR 0.97 0.87 - 1.13   APTT   Result Value Ref Range    APTT 30.6 24.7 - 36.0 sec   CRP QUANTITIVE (NON-CARDIAC)   Result Value Ref Range    Stat C-Reactive Protein 1.22 (H) 0.00 - 0.75 mg/dL   WESTERGREN SED RATE   Result Value Ref Range    Sed Rate Westergren 71 (H) 0 - 20 mm/hour   ESTIMATED GFR   Result Value Ref Range    GFR If African American >60 >60 mL/min/1.73 m 2    GFR If Non African American >60 >60 mL/min/1.73 m 2        All labs reviewed by me.    RADIOLOGY  DX-FOOT-COMPLETE 3+ LEFT   Final Result      1.  Soft tissue swelling and defect about the great toe MTP joint.   2.  Abnormal lucency is again noted about the first MTP joint suggestive of osteomyelitis/infection. Further evaluation with MRI without and with contrast is suggested.        The radiologist's interpretation of all radiological studies have been reviewed by me.    COURSE & MEDICAL DECISION MAKING  Nursing notes, VS, PMSFHx reviewed in chart.     Review of past medical records shows the patient long history of wound on the left foot, prior debridement    11:39 AM - Patient seen and examined at bedside. Ordered BMP, CBC with differential, Westergren sed rate, CRP quantitive, APTT, prothrombin time, and dx-foot to evaluate his symptoms. The differential diagnoses include but are not limited to:  Osteomyelitis, cellulitis, wound    Laboratory studies remarkable for an elevated ESR and CRP consistent with osteomyelitis.  X-ray demonstrates findings consistent with osteomyelitis.  Patient will be admitted for further evaluation and treatment.    3:22 PM I discussed the patient's case and the above findings with Dr. Nguyen (Hospitalist) who will admit    3:25 PM I discussed the patient's case and the above findings with Dr. Ernst (Ortho) who will consult      DISPOSITION:  Patient will be admitted to Dr. Monterroso in guarded condition.     FINAL IMPRESSION  1. Osteomyelitis of left foot, unspecified type (HCC)          IKali (Scribe), am scribing for, and in the presence of, Husam Graham M.D..    Electronically signed by: Kali Oconnor (Scribe), 8/21/2019    IHusam M.D. personally performed the services described in this documentation, as scribed by Kali Oconnor in my presence, and it is both accurate and complete.  C  The note accurately reflects work and decisions made by me.  Husam Graham  8/21/2019  4:44 PM

## 2019-08-22 ENCOUNTER — APPOINTMENT (OUTPATIENT)
Dept: RADIOLOGY | Facility: MEDICAL CENTER | Age: 54
DRG: 638 | End: 2019-08-22
Attending: HOSPITALIST
Payer: COMMERCIAL

## 2019-08-22 LAB
ALBUMIN SERPL BCP-MCNC: 3.5 G/DL (ref 3.2–4.9)
ALBUMIN/GLOB SERPL: 0.8 G/DL
ALP SERPL-CCNC: 62 U/L (ref 30–99)
ALT SERPL-CCNC: 14 U/L (ref 2–50)
ANION GAP SERPL CALC-SCNC: 8 MMOL/L (ref 0–11.9)
AST SERPL-CCNC: 11 U/L (ref 12–45)
BASOPHILS # BLD AUTO: 0.2 % (ref 0–1.8)
BASOPHILS # BLD: 0.01 K/UL (ref 0–0.12)
BILIRUB SERPL-MCNC: 0.4 MG/DL (ref 0.1–1.5)
BUN SERPL-MCNC: 12 MG/DL (ref 8–22)
CALCIUM SERPL-MCNC: 9.1 MG/DL (ref 8.5–10.5)
CHLORIDE SERPL-SCNC: 105 MMOL/L (ref 96–112)
CO2 SERPL-SCNC: 26 MMOL/L (ref 20–33)
CREAT SERPL-MCNC: 0.65 MG/DL (ref 0.5–1.4)
EKG IMPRESSION: NORMAL
EOSINOPHIL # BLD AUTO: 0.04 K/UL (ref 0–0.51)
EOSINOPHIL NFR BLD: 0.6 % (ref 0–6.9)
ERYTHROCYTE [DISTWIDTH] IN BLOOD BY AUTOMATED COUNT: 45 FL (ref 35.9–50)
GLOBULIN SER CALC-MCNC: 4.5 G/DL (ref 1.9–3.5)
GLUCOSE BLD-MCNC: 153 MG/DL (ref 65–99)
GLUCOSE BLD-MCNC: 167 MG/DL (ref 65–99)
GLUCOSE BLD-MCNC: 171 MG/DL (ref 65–99)
GLUCOSE BLD-MCNC: 286 MG/DL (ref 65–99)
GLUCOSE SERPL-MCNC: 163 MG/DL (ref 65–99)
GRAM STN SPEC: NORMAL
HCT VFR BLD AUTO: 36.1 % (ref 42–52)
HGB BLD-MCNC: 11.4 G/DL (ref 14–18)
IMM GRANULOCYTES # BLD AUTO: 0.01 K/UL (ref 0–0.11)
IMM GRANULOCYTES NFR BLD AUTO: 0.2 % (ref 0–0.9)
LYMPHOCYTES # BLD AUTO: 1.72 K/UL (ref 1–4.8)
LYMPHOCYTES NFR BLD: 27.6 % (ref 22–41)
MCH RBC QN AUTO: 28.7 PG (ref 27–33)
MCHC RBC AUTO-ENTMCNC: 31.6 G/DL (ref 33.7–35.3)
MCV RBC AUTO: 90.9 FL (ref 81.4–97.8)
MONOCYTES # BLD AUTO: 0.56 K/UL (ref 0–0.85)
MONOCYTES NFR BLD AUTO: 9 % (ref 0–13.4)
NEUTROPHILS # BLD AUTO: 3.9 K/UL (ref 1.82–7.42)
NEUTROPHILS NFR BLD: 62.4 % (ref 44–72)
NRBC # BLD AUTO: 0 K/UL
NRBC BLD-RTO: 0 /100 WBC
PLATELET # BLD AUTO: 233 K/UL (ref 164–446)
PMV BLD AUTO: 9.9 FL (ref 9–12.9)
POTASSIUM SERPL-SCNC: 4 MMOL/L (ref 3.6–5.5)
PROT SERPL-MCNC: 8 G/DL (ref 6–8.2)
RBC # BLD AUTO: 3.97 M/UL (ref 4.7–6.1)
SIGNIFICANT IND 70042: NORMAL
SITE SITE: NORMAL
SODIUM SERPL-SCNC: 139 MMOL/L (ref 135–145)
SOURCE SOURCE: NORMAL
WBC # BLD AUTO: 6.2 K/UL (ref 4.8–10.8)

## 2019-08-22 PROCEDURE — A9270 NON-COVERED ITEM OR SERVICE: HCPCS | Performed by: FAMILY MEDICINE

## 2019-08-22 PROCEDURE — 87205 SMEAR GRAM STAIN: CPT

## 2019-08-22 PROCEDURE — 85025 COMPLETE CBC W/AUTO DIFF WBC: CPT

## 2019-08-22 PROCEDURE — 93925 LOWER EXTREMITY STUDY: CPT

## 2019-08-22 PROCEDURE — 700102 HCHG RX REV CODE 250 W/ 637 OVERRIDE(OP): Performed by: HOSPITALIST

## 2019-08-22 PROCEDURE — 93922 UPR/L XTREMITY ART 2 LEVELS: CPT

## 2019-08-22 PROCEDURE — A9270 NON-COVERED ITEM OR SERVICE: HCPCS | Performed by: HOSPITALIST

## 2019-08-22 PROCEDURE — 82962 GLUCOSE BLOOD TEST: CPT | Mod: 91

## 2019-08-22 PROCEDURE — 80053 COMPREHEN METABOLIC PANEL: CPT

## 2019-08-22 PROCEDURE — 770006 HCHG ROOM/CARE - MED/SURG/GYN SEMI*

## 2019-08-22 PROCEDURE — 87077 CULTURE AEROBIC IDENTIFY: CPT | Mod: 91

## 2019-08-22 PROCEDURE — A9270 NON-COVERED ITEM OR SERVICE: HCPCS | Performed by: INTERNAL MEDICINE

## 2019-08-22 PROCEDURE — 93925 LOWER EXTREMITY STUDY: CPT | Mod: 26 | Performed by: INTERNAL MEDICINE

## 2019-08-22 PROCEDURE — 700105 HCHG RX REV CODE 258: Performed by: FAMILY MEDICINE

## 2019-08-22 PROCEDURE — 87070 CULTURE OTHR SPECIMN AEROBIC: CPT

## 2019-08-22 PROCEDURE — 93005 ELECTROCARDIOGRAM TRACING: CPT | Performed by: HOSPITALIST

## 2019-08-22 PROCEDURE — 700102 HCHG RX REV CODE 250 W/ 637 OVERRIDE(OP): Performed by: INTERNAL MEDICINE

## 2019-08-22 PROCEDURE — 700111 HCHG RX REV CODE 636 W/ 250 OVERRIDE (IP): Performed by: FAMILY MEDICINE

## 2019-08-22 PROCEDURE — 99233 SBSQ HOSP IP/OBS HIGH 50: CPT | Performed by: HOSPITALIST

## 2019-08-22 PROCEDURE — 93010 ELECTROCARDIOGRAM REPORT: CPT | Performed by: INTERNAL MEDICINE

## 2019-08-22 PROCEDURE — 99223 1ST HOSP IP/OBS HIGH 75: CPT | Performed by: INTERNAL MEDICINE

## 2019-08-22 PROCEDURE — 87040 BLOOD CULTURE FOR BACTERIA: CPT | Mod: 91

## 2019-08-22 PROCEDURE — 700101 HCHG RX REV CODE 250: Performed by: NURSE PRACTITIONER

## 2019-08-22 PROCEDURE — 700102 HCHG RX REV CODE 250 W/ 637 OVERRIDE(OP): Performed by: FAMILY MEDICINE

## 2019-08-22 PROCEDURE — 87186 SC STD MICRODIL/AGAR DIL: CPT | Mod: 91

## 2019-08-22 PROCEDURE — 36415 COLL VENOUS BLD VENIPUNCTURE: CPT

## 2019-08-22 PROCEDURE — 700105 HCHG RX REV CODE 258

## 2019-08-22 RX ORDER — ATORVASTATIN CALCIUM 20 MG/1
80 TABLET, FILM COATED ORAL NIGHTLY
Status: DISCONTINUED | OUTPATIENT
Start: 2019-08-22 | End: 2019-08-26

## 2019-08-22 RX ORDER — SODIUM HYPOCHLORITE 1.25 MG/ML
SOLUTION TOPICAL 2 TIMES DAILY
Status: DISCONTINUED | OUTPATIENT
Start: 2019-08-22 | End: 2019-08-27 | Stop reason: HOSPADM

## 2019-08-22 RX ORDER — LINEZOLID 600 MG/1
600 TABLET, FILM COATED ORAL EVERY 12 HOURS
Status: DISCONTINUED | OUTPATIENT
Start: 2019-08-22 | End: 2019-08-24

## 2019-08-22 RX ADMIN — LINEZOLID 600 MG: 600 TABLET, FILM COATED ORAL at 20:29

## 2019-08-22 RX ADMIN — SODIUM CHLORIDE 500 ML: 9 INJECTION, SOLUTION INTRAVENOUS at 03:39

## 2019-08-22 RX ADMIN — PIPERACILLIN AND TAZOBACTAM 4.5 G: 4; .5 INJECTION, POWDER, LYOPHILIZED, FOR SOLUTION INTRAVENOUS; PARENTERAL at 06:01

## 2019-08-22 RX ADMIN — ASPIRIN 81 MG: 81 TABLET, COATED ORAL at 06:01

## 2019-08-22 RX ADMIN — PIPERACILLIN AND TAZOBACTAM 4.5 G: 4; .5 INJECTION, POWDER, LYOPHILIZED, FOR SOLUTION INTRAVENOUS; PARENTERAL at 20:29

## 2019-08-22 RX ADMIN — GLIPIZIDE 5 MG: 5 TABLET ORAL at 06:01

## 2019-08-22 RX ADMIN — INSULIN HUMAN 2 UNITS: 100 INJECTION, SOLUTION PARENTERAL at 06:34

## 2019-08-22 RX ADMIN — PIPERACILLIN AND TAZOBACTAM 4.5 G: 4; .5 INJECTION, POWDER, LYOPHILIZED, FOR SOLUTION INTRAVENOUS; PARENTERAL at 14:10

## 2019-08-22 RX ADMIN — INSULIN HUMAN 5 UNITS: 100 INJECTION, SOLUTION PARENTERAL at 17:53

## 2019-08-22 RX ADMIN — VANCOMYCIN HYDROCHLORIDE 1300 MG: 500 INJECTION, POWDER, LYOPHILIZED, FOR SOLUTION INTRAVENOUS at 07:59

## 2019-08-22 RX ADMIN — ENOXAPARIN SODIUM 40 MG: 100 INJECTION SUBCUTANEOUS at 06:01

## 2019-08-22 RX ADMIN — SODIUM HYPOCHLORITE 1 ML: 1.25 SOLUTION TOPICAL at 18:00

## 2019-08-22 RX ADMIN — ATORVASTATIN CALCIUM 80 MG: 20 TABLET, FILM COATED ORAL at 20:29

## 2019-08-22 ASSESSMENT — ENCOUNTER SYMPTOMS
WEAKNESS: 0
PALPITATIONS: 0
RESPIRATORY NEGATIVE: 1
GASTROINTESTINAL NEGATIVE: 1
EYE PAIN: 0
BACK PAIN: 0
CLAUDICATION: 0
BRUISES/BLEEDS EASILY: 0
SHORTNESS OF BREATH: 0
DEPRESSION: 0
SORE THROAT: 0
SENSORY CHANGE: 0
CONSTIPATION: 0
HEADACHES: 0
NECK PAIN: 0
MUSCULOSKELETAL NEGATIVE: 1
DIARRHEA: 0
DIAPHORESIS: 0
MYALGIAS: 1
ABDOMINAL PAIN: 0
COUGH: 0
SPEECH CHANGE: 0
DIZZINESS: 0
WHEEZING: 0
PSYCHIATRIC NEGATIVE: 1
EYE DISCHARGE: 0
NEUROLOGICAL NEGATIVE: 1
LOSS OF CONSCIOUSNESS: 0
VOMITING: 0
FEVER: 0
CONSTITUTIONAL NEGATIVE: 1
NAUSEA: 0
CARDIOVASCULAR NEGATIVE: 1
CHILLS: 0
SPUTUM PRODUCTION: 0
HEMOPTYSIS: 0
EYES NEGATIVE: 1
FOCAL WEAKNESS: 0

## 2019-08-22 ASSESSMENT — LIFESTYLE VARIABLES: SUBSTANCE_ABUSE: 0

## 2019-08-22 NOTE — PROGRESS NOTES
Dressing to left foot POA, removed. Wound cleaned with NS. Images taken, new dressing applied (abd pad and kerlex), LDA opened. Pt tolerated well.

## 2019-08-22 NOTE — CARE PLAN
Problem: Safety  Goal: Will remain free from falls  Outcome: PROGRESSING AS EXPECTED  Note:   Calls for assistance when appropriate. Call light and personal belongings within reach. Bed in low and locked position. Fall education provided to patient.       Problem: Infection  Goal: Will remain free from infection  Outcome: PROGRESSING AS EXPECTED  Note:   Dressing will remain CDI throughout shift. Dressing changes performed as ordered. Patient will receive IV antibiotics as ordered and seen in eMAR. Lab values monitored daily, abnormals reported to MD.  Wound/dressing monitored for signs of infection including redness, swelling, warmth, odor, drainage every shift.  Standard precautions in use. Handwashing before and after patient contact. Education provided to patient regarding infection prevention techniques.

## 2019-08-22 NOTE — WOUND TEAM
Pt known to [facility] service.  Wounds related to foot wounds only.  Sent referral to [facility] inpt service.  Wound team signing off for now.

## 2019-08-22 NOTE — PROGRESS NOTES
Pharmacy Kinetics 53 y.o. male on vancomycin day # 1   2019    Vancomycin New Start   Provider specified end date: Not yet specified    Indication for Treatment: Empiric - L foot osteo    Pertinent history per medical record: Admitted on 2019 for LLE osteomyelitis. Patient referred from outpatient wound care for chronic L foot wound and concern for osteomyelitis. Patient recently prescribed oral Augmentin but reportedly did not complete his course of therapy as he thought it was worsening the swelling of the foot. Ortho consulting and empiric antibiotics initiated for treatment of the wound.     Other antibiotics: piperacillin/tazobactam 4.5 g IV q8hrs    Allergies: Patient has no known allergies.     Concerns for renal function include DM & BUN/SCr ratio > 20:1.    Pertinent cultures to date:   : wound cx - pending collection    MRSA nares swab if pneumonia is a concern (ordered/positive/negative/n-a): N/A    Pertinent Imagin/21: L foot xray - Soft tissue swelling and defect about the great toe MTP joint. Abnormal lucency is again noted about the first MTP joint suggestive of osteomyelitis/infection. Further evaluation with MRI without and with contrast is suggested.    Recent Labs     19  1203   WBC 7.5   NEUTSPOLYS 74.90*     Recent Labs     19  1203   BUN 16   CREATININE 0.75     No results for input(s): VANCOTROUGH, VANCOPEAK, VANCORANDOM in the last 72 hours.    No intake or output data in the 24 hours ending 19 1718     /76   Pulse (!) 108   Temp 36.4 °C (97.5 °F) (Temporal)   Resp 18   Wt 75 kg (165 lb 5.5 oz)   SpO2 97%  Temp (24hrs), Av.4 °C (97.5 °F), Min:36.4 °C (97.5 °F), Max:36.4 °C (97.5 °F)      A/P   1. Vancomycin dose change: Give vanco load 1900 mg IV x1 followed by vanco 1300 mg IV q12hrs  2. Next vancomycin level: 2-3 days (not currently ordered)  3. Goal trough: 12-16 mcg/mL  4. Comments: Empiric vancomycin initiated for treatment of L foot  osteo in patient with normal renal function and low risk identified for accumulation. Ortho consult and MRI pending, wound cultures also ordered. Will start scheduled BID vanco per protocol and plan trough at steady state to evaluate and adjust as necessary. Will follow cultures and recommend de-escalation of antibiotics if continued vanco therapy is no longer indicated.    Pharmacy will continue to follow.     Nathalie Martins, DiandraD

## 2019-08-22 NOTE — ASSESSMENT & PLAN NOTE
XRAY of the left foot showed Abnormal lucency noted about the first MTP joint suggestive of osteomyelitis/infection.  MRI of the left foot confirms OM 1st metatarsal and septic arthropathy 1st MTP joint, 2nd Metatarsal OM as well.  Needs amputation.  Started on IV antibiotics with Zosyn and vancomycin.  Orthopedic surgery consulted.  ID consult appreciated.  Ordered BCs x2 on 8/22 a.m after start of antibiotics.

## 2019-08-22 NOTE — CARE PLAN
Problem: Communication  Goal: The ability to communicate needs accurately and effectively will improve  Outcome: PROGRESSING AS EXPECTED     Problem: Safety  Goal: Will remain free from injury  Outcome: PROGRESSING AS EXPECTED  Goal: Will remain free from falls  Outcome: PROGRESSING AS EXPECTED     Problem: Infection  Goal: Will remain free from infection  Outcome: PROGRESSING AS EXPECTED     Problem: Knowledge Deficit  Goal: Knowledge of disease process/condition, treatment plan, diagnostic tests, and medications will improve  Outcome: PROGRESSING AS EXPECTED

## 2019-08-22 NOTE — CONSULTS
ADULT INFECTIOUS DISEASE CONSULT     Date of Service: 8/22/2019    Consult Requested By: Lamar Jernigan M.D.    Reason for Consultation: Diabetic foot infection    History of Present Illness:   Israel Aviles is a 53 y.o. male with history of diabetes mellitus, vascular disease.  He was hospitalized in June 2019 for a chronic foot wound which he had for 2 months.  Apparently he developed it while he was traveling in [facility].  He had been going to wound care clinic.  He was operated upon by Dr. Chatterjee on 6/21/2019 for critical limb ischemia.  He underwent I&D multiple compartments on 6/24/2019.  No OR cultures are available.  He was discharged on 7/2/2019 and has been going to the wound care clinic.  His left foot x-ray showed osteomyelitis to the left first metatarsal head.  He had been taking Augmentin.  They were scheduled to see infectious diseases but had not made an appointment.  He came back into the ER on 8/21/2019 for wound check.  Apparently the foot wound was getting worse.  The MRI shows first metatarsophalangeal joint septic arthropathy as well as osteomyelitis affecting the entire first metatarsal proximal phalanx and sesamoids.  Infectious disease consult has been called for antibiotics.  Review Of Systems:  Gen.-denies fevers. Denies any chills.  HEENT- denies any sore throat, headache or vision changes  Pulmonary- denies any cough, shortness of breath  Cardiovascular- denies any chest pain, leg swelling.    GI- denies any nausea vomiting diarrhea or abdominal pain  Musculoskeletal-complains of nonhealing wound on the left foot since March  Neuro- denies any weakness or sensory change  Psych- denies any depression or suicidal ideation  Genitourinary- denies any frequency or dysuria        PMH:   Past Medical History:   Diagnosis Date   • Diabetes (HCC)          PSH:  Past Surgical History:   Procedure Laterality Date   • IRRIGATION & DEBRIDEMENT ORTHO Left 6/24/2019    Procedure: IRRIGATION AND  DEBRIDEMENT, WOUND-FOOT, BIOLOGIC PLACEMENT, WOUND VAC PLACEMENT;  Surgeon: Charles Chopra M.D.;  Location: SURGERY [facility];  Service: Orthopedics       FAMILY HX:  History reviewed. No pertinent family history.    SOCIAL HX:  Social History     Socioeconomic History   • Marital status:      Spouse name: Not on file   • Number of children: Not on file   • Years of education: Not on file   • Highest education level: Not on file   Occupational History   • Not on file   Social Needs   • Financial resource strain: Not on file   • Food insecurity:     Worry: Not on file     Inability: Not on file   • Transportation needs:     Medical: Not on file     Non-medical: Not on file   Tobacco Use   • Smoking status: Never Smoker   • Smokeless tobacco: Never Used   Substance and Sexual Activity   • Alcohol use: No   • Drug use: No   • Sexual activity: Not on file   Lifestyle   • Physical activity:     Days per week: Not on file     Minutes per session: Not on file   • Stress: Not on file   Relationships   • Social connections:     Talks on phone: Not on file     Gets together: Not on file     Attends [employer] service: Not on file     Active member of club or organization: Not on file     Attends meetings of clubs or organizations: Not on file     Relationship status: Not on file   • Intimate partner violence:     Fear of current or ex partner: Not on file     Emotionally abused: Not on file     Physically abused: Not on file     Forced sexual activity: Not on file   Other Topics Concern   • Not on file   Social History Narrative   • Not on file     Social History     Tobacco Use   Smoking Status Never Smoker   Smokeless Tobacco Never Used     Social History     Substance and Sexual Activity   Alcohol Use No       Allergies/Intolerances:  No Known Allergies    History reviewed with the patient    Other Current Medications:    Current Facility-Administered Medications:   •  atorvastatin (LIPITOR) tablet 80 mg, 80  mg, Oral, Nightly, Lamar Jernigan M.D.  •  dakins 0.125% (1/4 strength) topical soln, , Topical, BID, BANDAR Ramos  •  senna-docusate (PERICOLACE or SENOKOT S) 8.6-50 MG per tablet 2 Tab, 2 Tab, Oral, BID **AND** polyethylene glycol/lytes (MIRALAX) PACKET 1 Packet, 1 Packet, Oral, QDAY PRN **AND** magnesium hydroxide (MILK OF MAGNESIA) suspension 30 mL, 30 mL, Oral, QDAY PRN **AND** bisacodyl (DULCOLAX) suppository 10 mg, 10 mg, Rectal, QDAY PRN, Elke Nguyen M.D.  •  enoxaparin (LOVENOX) inj 40 mg, 40 mg, Subcutaneous, DAILY, Elke Nguyen M.D., 40 mg at 08/22/19 0601  •  enalaprilat (VASOTEC) injection 1.25 mg, 1.25 mg, Intravenous, Q6HRS PRN, Elke Nguyen M.D.  •  ondansetron (ZOFRAN) syringe/vial injection 4 mg, 4 mg, Intravenous, Q4HRS PRN, Elke Nguyen M.D.  •  ondansetron (ZOFRAN ODT) dispertab 4 mg, 4 mg, Oral, Q4HRS PRN, Elke Nguyen M.D.  •  promethazine (PHENERGAN) tablet 12.5-25 mg, 12.5-25 mg, Oral, Q4HRS PRN, Elke Nguyen M.D.  •  promethazine (PHENERGAN) suppository 12.5-25 mg, 12.5-25 mg, Rectal, Q4HRS PRN, Elke Nguyen M.D.  •  prochlorperazine (COMPAZINE) injection 5-10 mg, 5-10 mg, Intravenous, Q4HRS PRN, Elke Nguyen M.D.  •  acetaminophen (TYLENOL) tablet 650 mg, 650 mg, Oral, Q6HRS PRN, Elke Nguyen M.D.  •  insulin regular (HUMULIN R) injection 2-9 Units, 2-9 Units, Subcutaneous, 4X/DAY ACHS, 2 Units at 08/22/19 0634 **AND** Accu-Chek ACHS, , , Q AC AND BEDTIME(S) **AND** NOTIFY MD and PharmD, , , Once **AND** glucose 4 g chewable tablet 16 g, 16 g, Oral, Q15 MIN PRN **AND** DEXTROSE 10% BOLUS 250 mL, 250 mL, Intravenous, Q15 MIN PRN, Elke Nguyen M.D.  •  [COMPLETED] piperacillin-tazobactam (ZOSYN) 4.5 g in  mL IVPB, 4.5 g, Intravenous, Once, Stopped at 08/21/19 1900 **AND** piperacillin-tazobactam (ZOSYN) 4.5 g in  mL IVPB, 4.5 g, Intravenous, Q8HRS, Elke Nguyen M.D., Last Rate: 25 mL/hr at  19 1410, 4.5 g at 19 1410  •  MD Alert...Vancomycin per Pharmacy, , Other, PHARMACY TO DOSE, Elke Nguyen M.D.  •  oxyCODONE immediate-release (ROXICODONE) tablet 5 mg, 5 mg, Oral, Q4HRS PRN, Elke Nguyen M.D.  •  vancomycin (VANCOCIN) 1,300 mg in  mL IVPB, 17 mg/kg, Intravenous, Q12HR, Elke Nguyen M.D., Stopped at 19 0959  •  aspirin EC (ECOTRIN) tablet 81 mg, 81 mg, Oral, DAILY, Elke Nguyen M.D., 81 mg at 19 0601  [unfilled]    Most Recent Vital Signs:  /79   Pulse 85   Temp 36.8 °C (98.3 °F) (Temporal)   Resp 17   Wt 75 kg (165 lb 5.5 oz)   SpO2 95%   BMI 22.42 kg/m²   Temp  Av.6 °C (97.8 °F)  Min: 36.3 °C (97.4 °F)  Max: 36.8 °C (98.3 °F)    Physical Exam:  General: Nontoxic, no acute distress  HEENT: sclera anicteric, PERRL, EOMI, MMM, no oral lesions  Neck: supple, no lymphadenopathy  Chest: CTAB, no r/r/w, normal work of breathing.  Cardiac: Some extrasystoles heard  Abdomen: + bowel sounds, soft, non-tender, non-distended, no HSM  Extremities: Left foot is warm.  Swelling of the second and third toe.  Ulcer on the left foot medial surface referred to the wound care notes and pictures  Skin: no rashes or erythema  Neuro: Alert and oriented times 3, non-focal exam    Pertinent Lab Results:  Recent Labs     19  1203 19  0353   WBC 7.5 6.2      Recent Labs     19  1203 19  0353   HEMOGLOBIN 11.8* 11.4*   HEMATOCRIT 36.0* 36.1*   MCV 91.4 90.9   MCH 29.9 28.7   PLATELETCT 244 233         Recent Labs     19  1203 19  0353   SODIUM 136 139   POTASSIUM 4.4 4.0   CHLORIDE 101 105   CO2 25 26   CREATININE 0.75 0.65        Recent Labs     19   ALBUMIN 3.5        Pertinent Micro:  Results     Procedure Component Value Units Date/Time    BLOOD CULTURE [[id number]] Collected:  19    Order Status:  Completed Specimen:  Blood from Peripheral Updated:  19 121    Narrative:        "Per Hospital Policy: Only change Specimen Src: to \"Line\" if  specified by physician order.    BLOOD CULTURE [[id number]] Collected:  08/22/19 1203    Order Status:  Completed Specimen:  Blood from Peripheral Updated:  08/22/19 1219    Narrative:       Per Hospital Policy: Only change Specimen Src: to \"Line\" if  specified by physician order.    GRAM STAIN [[id number]] Collected:  08/22/19 0617    Order Status:  Completed Specimen:  Wound Updated:  08/22/19 1215     Significant Indicator .     Source WND     Site LEFT FOOT     Gram Stain Result Few WBCs.  Rare Gram positive cocci.      CULTURE WOUND W/ GRAM STAIN [[id number]] Collected:  08/22/19 0617    Order Status:  Completed Specimen:  Wound from Left Foot Updated:  08/22/19 0627        No results found for: BLOODCULTU, BLDCULT, BCHOLD     Studies:  Dx-foot-complete 3+ Left    Result Date: 8/21/2019 8/21/2019 11:55 AM HISTORY/REASON FOR EXAM:  Atraumatic Pain/Swelling/Deformity; ? osteomyelitis Swelling, ulcer, diabetes TECHNIQUE/EXAM DESCRIPTION AND NUMBER OF VIEWS: 3 views of the LEFT foot. COMPARISON:  8/16/2019 FINDINGS: Redemonstrated is soft tissue defect along the medial aspect of the foot at the first MTP joint. There is abnormal lucency involving the first metatarsal head and the proximal phalangeal base which is suspicious for osteomyelitis and possibly septic arthritis. There is significant joint space narrowing of the MTP joint. No acute fracture or dislocation is seen. Alignment is anatomic. Small Achilles and plantar calcaneal spurs. There are atherosclerotic arterial calcifications.     1.  Soft tissue swelling and defect about the great toe MTP joint. 2.  Abnormal lucency is again noted about the first MTP joint suggestive of osteomyelitis/infection. Further evaluation with MRI without and with contrast is suggested.    Dx-foot-complete 3+ Left    Result Date: 8/16/2019 8/16/2019 4:11 PM HISTORY/REASON FOR EXAM:  Rule out osteomyelitis " TECHNIQUE/EXAM DESCRIPTION AND NUMBER OF VIEWS: 3 views of the LEFT foot. COMPARISON:  5/17/2019. FINDINGS:  There is indistinctness of the cortical surfaces first metatarsal-phalangeal joint which are suggestive of osteomyelitis. Bony alignment is anatomic. There is a large soft tissue defect over the dorsal and medial surface of the first metatarsophalangeal joint. No acute fracture or dislocation is identified.     1.  Indistinctness of the cortical surfaces first metatarsal-phalangeal joint which are suggestive of osteomyelitis. Consider MRI with and without contrast for further evaluation. 2.  Large soft tissue defect of the dorsal and medial surface of the first metatarsophalangeal joint.    Mr-foot-with & W/o Left    Result Date: 8/22/2019 8/21/2019 9:44 PM HISTORY/REASON FOR EXAM:  Diabetes with soft tissue infection TECHNIQUE/EXAM DESCRIPTION: MRI of the LEFT foot with and without contrast. Using a [employer] Sign[employer] 1.5 Luisa MRI scanner, T1 axial, sagittal, and coronal, fast spin-echo T2 fat-suppressed axial and coronal, fast inversion recovery sagittal, and T1 fat suppressed axial and sagittal post intravenous contrast images were obtained. A total of 15 mL ProHance given. COMPARISON:  Radiographs 8/21/2019 FINDINGS: The distal aspect of the phalanges are not well seen due to some artifact, inhomogeneous fat saturation First metatarsal-phalangeal centered increased T2, decreased T1 medullary space signals identified. The entire first metatarsus is affected as is the proximal phalanx. There is an metatarsal-phalangeal effusion. There is also slight edema and enhancement  in the medial cuneiform. No effusion. The first metatarsal head sesamoids are edematous and there is bony destruction posteriorly in the lateral sesamoid. There is also bony destruction involving the lateral aspect of the first metatarsal-phalangeal joint. Adjacent skin ulceration is seen medial to the metatarsal-phalangeal joint. No soft tissue  abscess is detected. There is a moderate forefoot skin thickening and soft tissue enhancement indicating cellulitis and phlegmonous change. No other bony destruction is seen but there is some edema in the adjacent second metatarsal head. This enhances coronal image 34. There is slight associated decreased T1 signal precontrast. No joint effusion is confirmed. No ligament or tendon tear is confirmed. No retraction noted. There is plantar fat pad decreased T1, mildly increased T2 and enhanced T1 signal overlying the calcaneus suggesting post contusion related change. Plantar fascia is moderately thickened and there is a calcaneus spur.     1.  First metatarsal-phalangeal joint septic arthropathy 2.  Osteomyelitis affecting the entire first metatarsal, proximal phalanx and sesamoids. Edema and enhancement in the adjacent medial cuneiform could be reactive or secondary to early septic arthropathy and osteomyelitis centered at the first TMT joint 3.  Suspicious for early second medial metatarsal head osteomyelitis. Septic arthropathy cannot be excluded 4.  Medial first metatarsal-phalangeal centered greater than dorsal medial forefoot skin ulceration with cellulitis and phlegmonous change. No abscess identified 5.  Second metatarsal shaft edema is more likely from stress response than infection 6.  Plantar fasciitis    Us-simone Single Level Bilat    Result Date: 2019   Vascular Laboratory  Conclusions  Monophasic  waveforms with low amplitude, right greater than left.  Decreased SIMONE on the right. Findings are suggestive of significant arterial  disease  TRELL CASTLE  Age:    53    Gender:     M  MRN:    [id number]  :    1965      BSA:  Exam Date:     2019 16:01  Room #:     Inpatient  Priority:     Routine  Ht (in):             Wt (lb):  Ordering Physician:        JENNIFER CLEANING  Referring Physician:  Sonographer:               Nettie Guzman RDCS, NICOLAS  Study Type:                 Complete Bilateral  Technical Quality:         Adequate  Indications:     Ulceration of LE  CPT Codes:       15518  ICD Codes:       707.1  History:         Ulcerations  Limitations:                 RIGHT  Waveform            Systolic BPs (mmHg)                             137           Brachial                                           Common Femoral  Monophasic                 97            Posterior Tibial  Monophasic                 101           Dorsalis Pedis                                           Peroneal                             0.74          ALYSSA                                           TBI                       LEFT  Waveform        Systolic BPs (mmHg)                             132           Brachial                                           Common Femoral  Monophasic                 152           Posterior Tibial  Monophasic                 168           Dorsalis Pedis                                           Peroneal                             1.23          ALYSSA                                           TBI  Findings  Right.  Doppler waveforms at the ankle are monophasic with low  amplitude.  Ankle-brachial index is moderately reduced.  Left.  Doppler waveforms at the ankle are monophasic with  low amplitude.  Ankle-brachial index is falsly normal.  Mitzi Diggs  (Electronically Signed)  Final Date:      22 August 2019                   16:44  IMPRESSION:     Diabetic foot infection  Osteomyelitis  Uncontrolled diabetes  Peripheral vascular disease    PLAN:   Israel Aviles is a 53 y.o. male with history of diabetes mellitus who is had chronic ulcer on his left foot.  He apparently got a blister and March and has been dealing with it since.  He had revascularization done in June.  His last hemoglobin A1c was 8.9.  Follow the wound cultures.  Orthopedic consult.  He will probably need amputation of 2 toes.  Change the vancomycin to Zyvox.  Control the blood sugars.   Continue with the Zosyn.      Discussed with IM. Will continue to follow    Mechelle Reveles M.D.     Please note that this dictation was created using voice recognition software. I have worked with technical experts from [facility] to optimize the interface.  I have made every reasonable attempt to correct obvious errors, but there may be errors of grammar and possibly content that I did not discover before finalizing the note.

## 2019-08-22 NOTE — PROGRESS NOTES
Bedside shift report received from night RN.  Assumed care at 0715, patient sleeping in bed, belongings and call light within reach, PIV assessed CDI, dressing drain visualized CDI. Needs met at this time.    Reviewed current POC with patient. POC review with CNA including vital sign frequency/drains/mobility.  Labs, notes, orders reviewed at this time.

## 2019-08-22 NOTE — ASSESSMENT & PLAN NOTE
With foot ulcer.  Now with osteomyelitis.  Orthopedic surgery and wound care following.  MRI confirmed OM 1st phalanx and sesamoids and septic arthropathy MTP 1st joint.

## 2019-08-22 NOTE — CONSULTS
Date of Service  8/22/2019    Reason For Consult  Chronic L foot wound/osteo    Requesting Physician  Marcelino Rankin NP    Consulting Physician  Aleksander Chatterjee M.D.    Primary Care Physician  Mj Bai M.D.    Chief Complaint  Non-healing L foot wound    History of Present Illness  Mr. Aviles is a 53 y.o. male well-known to me from previous admissions who returns with a chronic L foot wound which is regressing. He underwent LLE angio and extensive tibial revasc in June and initially had a good response. When I saw him a few weeks ago in clinic, the wound was granulating.    Unfortunately, lately the patient has been fairly non-compliant with wound care and the recommended course of antibiotics and his wound has regressed and is looking quite a bit worse.    He otherwise has no complaints.    Review of Systems  Review of Systems   Constitutional: Negative.    HENT: Negative.    Eyes: Negative.    Respiratory: Negative.    Cardiovascular: Negative.    Gastrointestinal: Negative.    Genitourinary: Negative.    Musculoskeletal: Negative.    Skin: Negative.    Neurological: Negative.    Endo/Heme/Allergies: Negative.    Psychiatric/Behavioral: Negative.        Past Medical History  Past Medical History:   Diagnosis Date   • Diabetes (HCC)        Surgical History  Past Surgical History:   Procedure Laterality Date   • IRRIGATION & DEBRIDEMENT ORTHO Left 6/24/2019    Procedure: IRRIGATION AND DEBRIDEMENT, WOUND-FOOT, BIOLOGIC PLACEMENT, WOUND VAC PLACEMENT;  Surgeon: Charles Chopra M.D.;  Location: SURGERY [facility];  Service: Orthopedics        Family History  History reviewed. No pertinent family history.     Social History  Social History     Socioeconomic History   • Marital status:      Spouse name: Not on file   • Number of children: Not on file   • Years of education: Not on file   • Highest education level: Not on file   Occupational History   • Not on file   Social Needs   •  Financial resource strain: Not on file   • Food insecurity:     Worry: Not on file     Inability: Not on file   • Transportation needs:     Medical: Not on file     Non-medical: Not on file   Tobacco Use   • Smoking status: Never Smoker   • Smokeless tobacco: Never Used   Substance and Sexual Activity   • Alcohol use: No   • Drug use: No   • Sexual activity: Not on file   Lifestyle   • Physical activity:     Days per week: Not on file     Minutes per session: Not on file   • Stress: Not on file   Relationships   • Social connections:     Talks on phone: Not on file     Gets together: Not on file     Attends Faith service: Not on file     Active member of club or organization: Not on file     Attends meetings of clubs or organizations: Not on file     Relationship status: Not on file   • Intimate partner violence:     Fear of current or ex partner: Not on file     Emotionally abused: Not on file     Physically abused: Not on file     Forced sexual activity: Not on file   Other Topics Concern   • Not on file   Social History Narrative   • Not on file       Medications  Prior to Admission Medications   Prescriptions Last Dose Informant Patient Reported? Taking?   amoxicillin-clavulanate (AUGMENTIN) 875-125 MG Tab 8/18/2019 at STOPPED Patient Yes Yes   Sig: Take 1 Tab by mouth 2 times a day. Pt started on 8/16/2019 for 14 day course.   aspirin 81 MG EC tablet 8/21/2019 at 1000 Patient No No   Sig: Take 1 Tab by mouth every day.   atorvastatin (LIPITOR) 40 MG Tab 8/20/2019 at 2230 Patient Yes Yes   Sig: Take 40 mg by mouth every evening.   clopidogrel (PLAVIX) 75 MG Tab 8/20/2019 at 2230 Patient Yes No   Sig: Take 75 mg by mouth every bedtime.   glipiZIDE (GLUCOTROL) 5 MG Tab 8/21/2019 at 1000 Patient Yes No   Sig: Take 5 mg by mouth 2 times a day.   metformin (GLUCOPHAGE) 1000 MG tablet 8/21/2019 at 1000 Patient Yes Yes   Sig: Take 1,000 mg by mouth 2 times a day, with meals.      Facility-Administered Medications:  None       Current Facility-Administered Medications   Medication Dose Route Frequency Provider Last Rate Last Dose   • atorvastatin (LIPITOR) tablet 80 mg  80 mg Oral Nightly Lamar Jernigan M.D.       • dakins 0.125% (1/4 strength) topical soln   Topical BID BANDAR Ramos       • senna-docusate (PERICOLACE or SENOKOT S) 8.6-50 MG per tablet 2 Tab  2 Tab Oral BID Elke Nguyen M.D.        And   • polyethylene glycol/lytes (MIRALAX) PACKET 1 Packet  1 Packet Oral QDAY PRN Elke Nguyen M.D.        And   • magnesium hydroxide (MILK OF MAGNESIA) suspension 30 mL  30 mL Oral QDAY PRTORI Nguyen M.D.        And   • bisacodyl (DULCOLAX) suppository 10 mg  10 mg Rectal QDAY PRN Elke Nguyen M.D.       • enoxaparin (LOVENOX) inj 40 mg  40 mg Subcutaneous DAILY Elke Nguyen M.D.   40 mg at 08/22/19 0601   • enalaprilat (VASOTEC) injection 1.25 mg  1.25 mg Intravenous Q6HRS PRTORI Nguyen M.D.       • ondansetron (ZOFRAN) syringe/vial injection 4 mg  4 mg Intravenous Q4HRS PRTORI Nguyen M.D.       • ondansetron (ZOFRAN ODT) dispertab 4 mg  4 mg Oral Q4HRS PRTORI Nguyen M.D.       • promethazine (PHENERGAN) tablet 12.5-25 mg  12.5-25 mg Oral Q4HRS MAULIK Nguyen M.D.       • promethazine (PHENERGAN) suppository 12.5-25 mg  12.5-25 mg Rectal Q4HRS MAULIK Nguyen M.D.       • prochlorperazine (COMPAZINE) injection 5-10 mg  5-10 mg Intravenous Q4HRS PRTORI Nguyen M.D.       • acetaminophen (TYLENOL) tablet 650 mg  650 mg Oral Q6HRS PRN Elke Nguyen M.D.       • insulin regular (HUMULIN R) injection 2-9 Units  2-9 Units Subcutaneous 4X/DAY JOSE Nguyen M.D.   2 Units at 08/22/19 0634    And   • glucose 4 g chewable tablet 16 g  16 g Oral Q15 MIN PRN Elke Nguyen M.D.        And   • DEXTROSE 10% BOLUS 250 mL  250 mL Intravenous Q15 MIN PRN Elke Nguyen M.D.       • piperacillin-tazobactam (ZOSYN) 4.5 g in NS  100 mL IVPB  4.5 g Intravenous Q8HRS Elke Nguyen M.D.   Stopped at 08/22/19 1001   • MD Alert...Vancomycin per Pharmacy   Other PHARMACY TO DOSE Elke Nguyen M.D.       • oxyCODONE immediate-release (ROXICODONE) tablet 5 mg  5 mg Oral Q4HRS PRN Elke Nguyen M.D.       • vancomycin (VANCOCIN) 1,300 mg in  mL IVPB  17 mg/kg Intravenous Q12HR Elke Nguyen M.D.   Stopped at 08/22/19 0959   • aspirin EC (ECOTRIN) tablet 81 mg  81 mg Oral DAILY Elke Nguyen M.D.   81 mg at 08/22/19 0601       Allergies  No Known Allergies      Physical Exam  Temp:  [36.3 °C (97.4 °F)-36.8 °C (98.3 °F)] 36.8 °C (98.3 °F)  Pulse:  [84-90] 85  Resp:  [16-18] 17  BP: (128-136)/(79-85) 135/79  SpO2:  [95 %-100 %] 95 %    Pulse/Extremity Exam:    Femorals:        Right: palpable       Left palpable    Pedal Pulses:       Right DP absent       Right PT monophasic       Left DP monophasic       Left PT monophasic    Wounds: L foot w/ an extensive full-thickness wound over the dosal/medial aspect with necrotic edges, dry fibrinous debris and minimal granulation tissue    General appearance: NAD, conversing appropriately  Psych: Normal affect, mood, judgement  Neuro: CN II-XII grossly intact.   Neck: full range of motion  Lungs: No inspiratory stridor or wheezing  CV: RRR  Abdomen: Soft, NT/ND  Skin: No rashes    Labs Reviewed Today:  Recent Labs     08/21/19  1203 08/22/19  0353   WBC 7.5 6.2   RBC 3.94* 3.97*   HEMOGLOBIN 11.8* 11.4*   HEMATOCRIT 36.0* 36.1*   MCV 91.4 90.9   MCH 29.9 28.7   MCHC 32.8* 31.6*   RDW 45.0 45.0   PLATELETCT 244 233   MPV 10.0 9.9     Recent Labs     08/21/19  1203 08/22/19  0353   SODIUM 136 139   POTASSIUM 4.4 4.0   CHLORIDE 101 105   CO2 25 26   GLUCOSE 223* 163*   BUN 16 12   CREATININE 0.75 0.65   CALCIUM 9.2 9.1     Recent Labs     08/21/19  1203 08/22/19  0353   ALTSGPT  --  14   ASTSGOT  --  11*   ALKPHOSPHAT  --  62   TBILIRUBIN  --  0.4   GLUCOSE 223* 163*     Recent Labs  "    08/21/19  1203   APTT 30.6   INR 0.97             No results for input(s): TROPONINI in the last 72 hours.    Urinalysis:    No results found     Imaging Reviewed Today:  I personally reviewed all non-invasive vascular testing including images, x-rays, tracings, arterial waveforms, and duplex exams relevant to this admission. My interpretation is below:    ABIs: pending    Arterial Duplex: pending    8/21/2019 MRI: Septic arthropathy of the 1st MTP. Osteo of the entire 1st metatarsal, proximal phalanx and sesamoid. Possible osteo of the cuneiform. Possible osteo of the 2nd metatarsal.      Assessment/Plan & Medical Decision-Making  Mr. Aviles returns with regression of the L foot wound, now with osteo. The patient and his family seem to have a very poor grasp of the situation and have been non-compliant with recommendations of the wound care team and ID over the past month. He stopped his antibiotics a few weeks ago because he thought redness of the foot and swelling was a side effect. They are currently under the impression the foot looks fine and feel the way is healing is \"normal\" for people with his ethnic background. I had a long discussion with him and family today and reiterated that his foot is doing very poorly and its appearance is very concerning.     At this point, there is likely no salvage option as osteo has set in and spreading. The wound is too proximal on the foot for a TMA and he is looking at a BKA. This was all discussed with them on their last admission and when I saw him in clinic a few weeks ago. They are very opposed to an amputation but it seems unlikely further debridement will solve the problem.     At this point, there is no further vascular intervention to do. I would recommend a BKA.    Aleksander Chatterjee MD  Vascular Surgeon  [facility]  Office: [phone]    "

## 2019-08-22 NOTE — PROGRESS NOTES
Pharmacy Kinetics 53 y.o. male on vancomycin day # 2  2019    Currently Dose: Vancomycin 1300 mg iv q12hr (~17 mg/kg/dose)  Received Load Dose: Yes    Indication for Treatment: left foot osteomyelitis rule-out  ID Service Following: Yes (per admit H&P)    Pertinent history per medical record: Admitted on 2019 for concerning LLE osteomyelitis. Patient reported follows with outpatient wound care for chronic lower extremity wound. Patient reportedly recently given Rx for oral Augmentin PTA.    Other antibiotics: piperacillin/tazobactam 4.5 gm iv q8h    Allergies: Patient has no known allergies.     List concerns for accumulation of vancomycin: age 53, abnormal LFT's    Pertinent cultures to date:   Results     Procedure Component Value Units Date/Time    CULTURE WOUND W/ GRAM STAIN [[id number]] Collected:  19    Order Status:  Completed Specimen:  Wound from Left Foot Updated:  19        MRSA nares swab if pneumonia is a concern (ordered/positive/negative/n-a): n/a    Recent Labs     19  1203 19  0353   WBC 7.5 6.2   NEUTSPOLYS 74.90* 62.40     Recent Labs     19  1203 19  0353   BUN 16 12   CREATININE 0.75 0.65   ALBUMIN  --  3.5     Intake/Output Summary (Last 24 hours) at 2019 0918  Last data filed at 2019 0900  Gross per 24 hour   Intake 120 ml   Output --   Net 120 ml      /79   Pulse 85   Temp 36.8 °C (98.3 °F) (Temporal)   Resp 17   Wt 75 kg (165 lb 5.5 oz)   SpO2 95%  Temp (24hrs), Av.6 °C (97.8 °F), Min:36.3 °C (97.4 °F), Max:36.8 °C (98.3 °F)    Estimated Creatinine Clearance: 139.4 mL/min (by C-G formula based on SCr of 0.65 mg/dL).    A/P   1. Vancomycin dose change: not indicated   2. Next vancomycin level: 19 @0730 (ordered)  3. Goal trough: 12-16 mcg/mL  4. Comments: VS stable. Afebrile. WBC stable. CrCl ~139 mL/min (SCr improved). Microbiology pending. Concerns for accumulation of vancomycin noted. Vancomycin level in  place prior to AM dose 8/23/19 to assess clearance. BMP with AM labs. Pharmacy will continue to follow.    Ilya Rea PharmD

## 2019-08-22 NOTE — ASSESSMENT & PLAN NOTE
Hemoglobin A1c was 8.9% in June 2019  Continue with sliding scale insulin  Maintain blood glucoses below 150 mg/dL  Repeat a1c

## 2019-08-22 NOTE — H&P
Hospital Medicine History & Physical Note    Date of Service  8/21/2019    Primary Care Physician  Mj Bai M.D.    Consultants  Orthopedic surgery    Code Status  Full code    Chief Complaint  Worsening wound on the left foot    History of Presenting Illness  53 y.o. male with past medical history of type 2 diabetes mellitus poorly controlled, and history of diabetic foot and foot ulcer on the left who follow-up with wound care as an outpatient comes in with worsening of the wound and discharge.  Patient stated that he noticed an ulcer 5-month ago and he had debridement with wound VAC placed in June 24 of 2019.  Patient was following up with wound care as an outpatient.  Recently he noticed worsening of the wound and discharges.  At the wound care he was given a prescription of Augmentin.  However, patient went on vacation to [place] and he did not get his refill of antibiotics.  Stated that the wound had became larger and more discharges has been noted.  Today he had an x-ray of his left foot at the wound care center which was abnormal and he was advised to go to the ER.  An x-ray was done here showed abnormal lucency about the first MTP joint suggesting osteomyelitis.  Orthopedic surgery consulted by the ER physician.  Patient started on broad-spectrum antibiotics and MRI of the left foot has been ordered.    Review of Systems  Review of Systems   Constitutional: Negative for chills and fever.   HENT: Negative for hearing loss and tinnitus.    Eyes: Negative for blurred vision, double vision and photophobia.   Respiratory: Negative for cough and hemoptysis.    Cardiovascular: Negative for chest pain, palpitations and orthopnea.   Gastrointestinal: Negative for heartburn and nausea.   Genitourinary: Negative for dysuria and urgency.   Musculoskeletal: Positive for joint pain (left foot pain). Negative for myalgias.   Neurological: Negative for dizziness, tingling and headaches.    Psychiatric/Behavioral: Negative for depression, substance abuse and suicidal ideas.       Past Medical History   has a past medical history of Diabetes (HCC).   PVD    Surgical History   has a past surgical history that includes irrigation & debridement ortho (Left, 6/24/2019).     Family History  Family history has been reviewed and found noncontributory.    Social History   reports that he has never smoked. He has never used smokeless tobacco. He reports that he does not drink alcohol or use drugs.    Allergies  No Known Allergies    Medications  Prior to Admission Medications   Prescriptions Last Dose Informant Patient Reported? Taking?   amoxicillin-clavulanate (AUGMENTIN) 875-125 MG Tab 8/18/2019 at STOPPED Patient Yes Yes   Sig: Take 1 Tab by mouth 2 times a day. Pt started on 8/16/2019 for 14 day course.   aspirin 81 MG EC tablet 8/21/2019 at 1000 Patient No No   Sig: Take 1 Tab by mouth every day.   atorvastatin (LIPITOR) 40 MG Tab 8/20/2019 at 2230 Patient Yes Yes   Sig: Take 40 mg by mouth every evening.   clopidogrel (PLAVIX) 75 MG Tab 8/20/2019 at 2230 Patient Yes No   Sig: Take 75 mg by mouth every bedtime.   glipiZIDE (GLUCOTROL) 5 MG Tab 8/21/2019 at 1000 Patient Yes No   Sig: Take 5 mg by mouth 2 times a day.   metformin (GLUCOPHAGE) 1000 MG tablet 8/21/2019 at 1000 Patient Yes Yes   Sig: Take 1,000 mg by mouth 2 times a day, with meals.      Facility-Administered Medications: None       Physical Exam  Temp:  [36.4 °C (97.5 °F)-36.8 °C (98.2 °F)] 36.8 °C (98.2 °F)  Pulse:  [] 89  Resp:  [16-18] 18  BP: (116-136)/(76-85) 132/82  SpO2:  [97 %-100 %] 100 %    Physical Exam   Constitutional: He is oriented to person, place, and time. No distress.   HENT:   Head: Normocephalic and atraumatic.   Mouth/Throat: No oropharyngeal exudate.   Eyes: Pupils are equal, round, and reactive to light. Conjunctivae are normal. No scleral icterus.   Neck: Normal range of motion. No tracheal deviation present.  No thyromegaly present.   Cardiovascular: Normal rate and regular rhythm. Exam reveals no gallop and no friction rub.   Pulmonary/Chest: Effort normal. No respiratory distress. He has no wheezes.   Abdominal: Soft. He exhibits no distension. There is no tenderness.   Musculoskeletal:   Large skin ulcer noted on the mediodorsal aspect of the left foot measures around 10 x 4 cm.    Neurological: He is alert and oriented to person, place, and time.   Skin: Skin is warm and dry. He is not diaphoretic.   Psychiatric: He has a normal mood and affect. His behavior is normal.       Laboratory:  Recent Labs     08/21/19  1203   WBC 7.5   RBC 3.94*   HEMOGLOBIN 11.8*   HEMATOCRIT 36.0*   MCV 91.4   MCH 29.9   MCHC 32.8*   RDW 45.0   PLATELETCT 244   MPV 10.0     Recent Labs     08/21/19  1203   SODIUM 136   POTASSIUM 4.4   CHLORIDE 101   CO2 25   GLUCOSE 223*   BUN 16   CREATININE 0.75   CALCIUM 9.2     Recent Labs     08/21/19  1203   GLUCOSE 223*     Recent Labs     08/21/19  1203   APTT 30.6   INR 0.97     No results for input(s): NTPROBNP in the last 72 hours.      No results for input(s): TROPONINT in the last 72 hours.    Urinalysis:    No results found     Imaging:  DX-FOOT-COMPLETE 3+ LEFT   Final Result      1.  Soft tissue swelling and defect about the great toe MTP joint.   2.  Abnormal lucency is again noted about the first MTP joint suggestive of osteomyelitis/infection. Further evaluation with MRI without and with contrast is suggested.      MR-FOOT-WITH & W/O LEFT    (Results Pending)         Assessment/Plan:  I anticipate this patient will require at least two midnights for appropriate medical management, necessitating inpatient admission.    * Osteomyelitis of left foot (HCC)- (present on admission)  Assessment & Plan  XRAY of the left foot showed Abnormal lucency noted about the first MTP joint suggestive of osteomyelitis/infection.  MRI of the left foot has been ordered and pending.  Started on IV  antibiotics with Zosyn and vancomycin.  Orthopedic surgery consulted.  We will consult ID in the morning.    Diabetic foot (HCC)- (present on admission)  Assessment & Plan  With foot ulcer.  Now with osteomyelitis.  Orthopedic surgery and wound care following.    Normocytic anemia- (present on admission)  Assessment & Plan  Monitor H&H    Uncontrolled type 2 diabetes mellitus with hyperglycemia (HCC)- (present on admission)  Assessment & Plan  Hemoglobin A1c was 8.9% in June 2019  Continue with sliding scale insulin  Maintain blood glucoses below 150 mg/dL    Peripheral arterial disease (HCC)- (present on admission)  Assessment & Plan  On Plavix and statin at home.  We will continue.      VTE prophylaxis: Lovenox

## 2019-08-22 NOTE — PROGRESS NOTES
LIMB PRESERVATION SERVICE CONSULT      DATE OF CONSULTATION: 8/22/2019    REASON FOR CONSULT: Left foot     HISTORY OF PRESENT ILLNESS: Israel Aviles is a 53 y.o.  with a past medical history that includes type 2 diabetes, PVD, admitted 8/21/2019 for Osteomyelitis of foot, left, acute (HCC).     LPS has been consulted for left dorsal medial foot, left dorsal second toe, left dorsal third toe, left lateral fifth MTH.  Well-known to LPS and outpatient wound care.  Reports he was in [place], wore leather shoes that were too tight and developed blisters in March 2019. He cleaned ulcers with hydrogen peroxide but the ulcers worsened. He went to urgent care, was referred to wound clinic in May 9, 2019 and arterial studies were ordered that day. He did not complete studies until 5/24. Once studies were completed, he was scheduled to be seen by Dr. Graham at the clinic to discuss results however pt went out of town and was not seen until 6/17. After seeing Dr. Graham, direct admission was arranged for surgery but pt had difficulty grasping the seriousness of his condition. He did not present to hospital until 6/21/19.    He underwent left DP angioplasty, left AT angioplasty and atherectomy, left PT angioplasty on 6/21/2019 by Dr. Chatterjee.  He then had a left foot I&D with application of epi-fix and wound VAC by Dr. Chopra on 6/24/2019.    He was discharged with outpatient wound care follow-up.   ACell and VAC therapy were initially used to dorsal medial foot ulcer but it continued to worsen.  VAC therapy was discontinued, started on honey gel and Bactrim DS.  He also developed ulcers to lateral foot. Was seen again at LPS rounds on 8/2/2019 for worsening soft tissue ulceration and BKA amputation was recommended over TMA taking into consideration worsening ulcerations and giving patient the best option at still having a functional limb.    Patient and family refused any sort of amputation and requested for  hyperbaric therapy.  Referral for hyperbaric therapy, ID, renewal of Bactrim DS and BMP were ordered.  However patient did not follow through with referral to ID, BMP, or taking Bactrim DS.  He went out of town for 2 weeks.  Returned to wound clinic on 8/16 with 100% white slough and eschar to dorsal foot ulceration.  Was given Rx for Augmentin and x-ray of left foot. X-ray of foot was positive for osteomyelitis.  Results were discussed with patient and family, and family mentioned he stopped taking his antibiotics after 3 days as it was decided by family that the antibiotic was the source of erythema and edema to his foot.  Discussed the seriousness of his condition and risks of not being treated including sepsis, bacteremia which if left untreated could lead to death. Requested he go to ED.  He did not present to the emergency department until the next day when he was admitted.     IV antibiotics were started on this admission.  Infectious diseases has not been consulted.    Xray completed and is positive for osteomyelitis.  Ortho Dr. Chopra, Vascular Dr. Chatterjee involved during past admission.    Diagnosed with diabetes 15 years ago, and is currently managing with oral diabetic agents.  Checks blood sugars daily and reports that these typically average around 110s.  Has had previous diabetes education.    Denies numbness to feet.  Checks feet routinely.  Previously given offloading shoe.  Does not have diabetic shoes.        Smoking:   reports that he has never smoked. He has never used smokeless tobacco.    Alcohol:   reports that he does not drink alcohol.    Drug:   reports that he does not use drugs.      PAST MEDICAL HISTORY:   Past Medical History:   Diagnosis Date   • Diabetes (HCC)         PAST SURGICAL HISTORY:   Past Surgical History:   Procedure Laterality Date   • IRRIGATION & DEBRIDEMENT ORTHO Left 6/24/2019    Procedure: IRRIGATION AND DEBRIDEMENT, WOUND-FOOT, BIOLOGIC PLACEMENT, WOUND VAC PLACEMENT;   Surgeon: Charles Chopra M.D.;  Location: SURGERY [facility];  Service: Orthopedics       MEDICATIONS:   Current Facility-Administered Medications   Medication Dose   • atorvastatin (LIPITOR) tablet 80 mg  80 mg   • dakins 0.125% (1/4 strength) topical soln     • senna-docusate (PERICOLACE or SENOKOT S) 8.6-50 MG per tablet 2 Tab  2 Tab    And   • polyethylene glycol/lytes (MIRALAX) PACKET 1 Packet  1 Packet    And   • magnesium hydroxide (MILK OF MAGNESIA) suspension 30 mL  30 mL    And   • bisacodyl (DULCOLAX) suppository 10 mg  10 mg   • enoxaparin (LOVENOX) inj 40 mg  40 mg   • enalaprilat (VASOTEC) injection 1.25 mg  1.25 mg   • ondansetron (ZOFRAN) syringe/vial injection 4 mg  4 mg   • ondansetron (ZOFRAN ODT) dispertab 4 mg  4 mg   • promethazine (PHENERGAN) tablet 12.5-25 mg  12.5-25 mg   • promethazine (PHENERGAN) suppository 12.5-25 mg  12.5-25 mg   • prochlorperazine (COMPAZINE) injection 5-10 mg  5-10 mg   • acetaminophen (TYLENOL) tablet 650 mg  650 mg   • insulin regular (HUMULIN R) injection 2-9 Units  2-9 Units    And   • glucose 4 g chewable tablet 16 g  16 g    And   • DEXTROSE 10% BOLUS 250 mL  250 mL   • piperacillin-tazobactam (ZOSYN) 4.5 g in  mL IVPB  4.5 g   • MD Alert...Vancomycin per Pharmacy     • oxyCODONE immediate-release (ROXICODONE) tablet 5 mg  5 mg   • vancomycin (VANCOCIN) 1,300 mg in  mL IVPB  17 mg/kg   • aspirin EC (ECOTRIN) tablet 81 mg  81 mg       ALLERGIES:  No Known Allergies     FAMILY HISTORY: History reviewed. No pertinent family history.      REVIEW OF SYSTEMS:   Constitutional: Negative for chills, fever   Respiratory: Negative for cough and shortness of breath.    Cardiovascular:Negative for chest pain, and claudication.   Gastrointestinal: Negative for constipation, diarrhea, nausea and vomiting.   Lower extremities: positive for swelling and redness to left foot  Neurological: Negative for numbness to feet and lower legs      RESULTS:     Recent  Labs     08/21/19  1203 08/22/19  0353   WBC 7.5 6.2   RBC 3.94* 3.97*   HEMOGLOBIN 11.8* 11.4*   HEMATOCRIT 36.0* 36.1*   MCV 91.4 90.9   MCH 29.9 28.7   MCHC 32.8* 31.6*   RDW 45.0 45.0   PLATELETCT 244 233   MPV 10.0 9.9     Recent Labs     08/21/19  1203 08/22/19  0353   SODIUM 136 139   POTASSIUM 4.4 4.0   CHLORIDE 101 105   CO2 25 26   GLUCOSE 223* 163*   BUN 16 12         ESR:   71    CRP:   1.22       X-ray: 8/16/2019 of left foot:  1.  Indistinctness of the cortical surfaces first metatarsal-phalangeal joint which are suggestive of osteomyelitis. Consider MRI with and without contrast for further evaluation.  2.  Large soft tissue defect of the dorsal and medial surface of the first metatarsophalangeal joint.    X-ray 8/21/2019:  1.  Soft tissue swelling and defect about the great toe MTP joint.  2.  Abnormal lucency is again noted about the first MTP joint suggestive of osteomyelitis/infection.      MRI:   8/21/2019:  1.  First metatarsal-phalangeal joint septic arthropathy    2.  Osteomyelitis affecting the entire first metatarsal, proximal phalanx and sesamoids. Edema and enhancement in the adjacent medial cuneiform could be reactive or secondary to early septic arthropathy and osteomyelitis centered at the first TMT joint    3.  Suspicious for early second medial metatarsal head osteomyelitis. Septic arthropathy cannot be excluded    4.  Medial first metatarsal-phalangeal centered greater than dorsal medial forefoot skin ulceration with cellulitis and phlegmonous change. No abscess identified    5.  Second metatarsal shaft edema is more likely from stress response than infection    6.  Plantar fasciitis    Arterial studies:   Arterial studies completed 5/24/2019 prior to revascularization procedure on 6/21/2019  New arterial studies ordered      A1c:  Lab Results   Component Value Date/Time    HBA1C 8.9 (H) 06/28/2019 07:19 AM            Microbiology:  Results     Procedure Component Value Units  "Date/Time    BLOOD CULTURE [[id number]] Collected:  08/22/19 1203    Order Status:  Completed Specimen:  Blood from Peripheral Updated:  08/22/19 1219    Narrative:       Per Hospital Policy: Only change Specimen Src: to \"Line\" if  specified by physician order.    BLOOD CULTURE [[id number]] Collected:  08/22/19 1203    Order Status:  Completed Specimen:  Blood from Peripheral Updated:  08/22/19 1219    Narrative:       Per Hospital Policy: Only change Specimen Src: to \"Line\" if  specified by physician order.    GRAM STAIN [[id number]] Collected:  08/22/19 0617    Order Status:  Completed Specimen:  Wound Updated:  08/22/19 1215     Significant Indicator .     Source WND     Site LEFT FOOT     Gram Stain Result Few WBCs.  Rare Gram positive cocci.      CULTURE WOUND W/ GRAM STAIN [[id number]] Collected:  08/22/19 0617    Order Status:  Completed Specimen:  Wound from Left Foot Updated:  08/22/19 0627           PHYSICAL EXAMINATION:     VITAL SIGNS: /79   Pulse 85   Temp 36.8 °C (98.3 °F) (Temporal)   Resp 17   Wt 75 kg (165 lb 5.5 oz)   SpO2 95%   BMI 22.42 kg/m²       General Appearance:  Well developed, well nourished, in no acute distress    Lower Extremity Assessment:    Edema:   No significant edema noted    Pulses:  R foot: unable to palpate PT, DP. With doppler, monophasic PT only, unable to locate DP  L foot: Unable to palpate PT, DP with Doppler monophasic to PT, DP    ROM dorsi/plantarflexion  Intact    Structural /mechanical changes:  Intact    Sensory Assessment  Sensate to bilateral foot      Wound Assessment:    Left dorsal medial foot ulcer complicated by diabetes and PAD:  Bone visible of first MTH  Dry hard yellow adherent slough to medial aspect.  Eschar to proximal aspect.  Increased red tissue to dorsal lateral forefoot compared to my last visit with patient on 8/16/2019  Erythema: Minimal  Drainage: None  Callus: None  Odor: None    Left dorsal second toe complicated by diabetes and " PAD:  Eschar circular, black, dry and intact without bogginess    Left dorsal third toe located by diabetes and PAD:  Eschar circular, black dry and intact without bogginess    Left lateral fifth MTH evolving DTI:  Hard black thin eschar.  No bogginess detected    Saline moistened gauze, followed by adhesive foam was applied to dorsal medial foot ulcer.  RN to apply dressing once supplies available.      Education:   - Implications of loss of protective sensation (LOPS) discussed with patient- including increased risk for amputation.  Advised to check feet at least daily, moisturize feet, and to always wear protective foot wear.   -avoid trimming own nails. See podiatrist or certified foot and nail RN  -keep blood sugars <150 for improved wound healing          ASSESSMENT AND PLAN:   Left foot chronic ulcerations since 3/2019.  S/P revascularization and I&D in June 2019.  Noncompliant with wound care, antibiotic therapy, and surgical recommendations.  Left foot x-ray and MRI positive for osteomyelitis extending to first tarsal metatarsal joint  Patient has previously been recommended by Ortho to undergo left BKA but refused.  Patient inquiring about amputating only the first metatarsal. Wound and OM would remain requiring prolonged wound care, IV abx therapy which may not effectively treat remaining OM.  Discussed remaining ulcers to L 2, 3, 5 toes. Functional versus nonfunctional limb reviewed.      Wound:  Left dorsal foot ulcer has been covered with dry gauze only for the last day.  Slough dry and adherent  Start dakins BID to L medial dorsal foot for chemical debridement, antimicrobial therapy  Betadine to L 2nd and 3rd and 5th MTH daily to keep ulcers dry and intact    Wound care orders placed for nursing    Imaging:  Recheck arterial studies since revascularization procedure on 6/21/2019 to see if tibial/pedal arteries have remained open    Vascular Status:   Dr. Chatterjee consulted. Previously involved with his  case.  Case discussed with surgeon.    Surgery:   Patient and family requesting second surgical opinion.  Dr. Ernst previously consulted by ER physician this admission.  Family reports they have not spoken to surgeon yet.  Contacted Dr. Ernst to speak to patient regarding second surgical opinion.    Offloading:  Previously given offloading shoe, refused to wear    Weight bearing:   LLE NWB      Infection: ID consulted     PT/OT: consult    Diabetes: declined, A1c last admission 6/28.  Seen by diabetes education during last admission.      Discharge Plan:  TBD    Please note that this dictation was created using voice recognition software. I have  worked with technical experts from [employer] to optimize the interface.  I have made every reasonable attempt to correct obvious errors, but there may be errors of grammar and possibly content that I did not discover before finalizing the note.

## 2019-08-23 ENCOUNTER — APPOINTMENT (OUTPATIENT)
Dept: WOUND CARE | Facility: MEDICAL CENTER | Age: 54
End: 2019-08-23
Attending: PHYSICIAN ASSISTANT
Payer: COMMERCIAL

## 2019-08-23 LAB
ANION GAP SERPL CALC-SCNC: 5 MMOL/L (ref 0–11.9)
BASOPHILS # BLD AUTO: 0.6 % (ref 0–1.8)
BASOPHILS # BLD: 0.04 K/UL (ref 0–0.12)
BUN SERPL-MCNC: 10 MG/DL (ref 8–22)
CALCIUM SERPL-MCNC: 9 MG/DL (ref 8.5–10.5)
CHLORIDE SERPL-SCNC: 103 MMOL/L (ref 96–112)
CO2 SERPL-SCNC: 27 MMOL/L (ref 20–33)
CREAT SERPL-MCNC: 0.74 MG/DL (ref 0.5–1.4)
EOSINOPHIL # BLD AUTO: 0.11 K/UL (ref 0–0.51)
EOSINOPHIL NFR BLD: 1.6 % (ref 0–6.9)
ERYTHROCYTE [DISTWIDTH] IN BLOOD BY AUTOMATED COUNT: 45.1 FL (ref 35.9–50)
EST. AVERAGE GLUCOSE BLD GHB EST-MCNC: 174 MG/DL
GLUCOSE BLD-MCNC: 164 MG/DL (ref 65–99)
GLUCOSE BLD-MCNC: 195 MG/DL (ref 65–99)
GLUCOSE BLD-MCNC: 221 MG/DL (ref 65–99)
GLUCOSE BLD-MCNC: 260 MG/DL (ref 65–99)
GLUCOSE SERPL-MCNC: 184 MG/DL (ref 65–99)
HBA1C MFR BLD: 7.7 % (ref 0–5.6)
HCT VFR BLD AUTO: 35.5 % (ref 42–52)
HGB BLD-MCNC: 11 G/DL (ref 14–18)
IMM GRANULOCYTES # BLD AUTO: 0.02 K/UL (ref 0–0.11)
IMM GRANULOCYTES NFR BLD AUTO: 0.3 % (ref 0–0.9)
LYMPHOCYTES # BLD AUTO: 2.02 K/UL (ref 1–4.8)
LYMPHOCYTES NFR BLD: 30.1 % (ref 22–41)
MCH RBC QN AUTO: 27.8 PG (ref 27–33)
MCHC RBC AUTO-ENTMCNC: 31 G/DL (ref 33.7–35.3)
MCV RBC AUTO: 89.9 FL (ref 81.4–97.8)
MONOCYTES # BLD AUTO: 0.62 K/UL (ref 0–0.85)
MONOCYTES NFR BLD AUTO: 9.2 % (ref 0–13.4)
NEUTROPHILS # BLD AUTO: 3.91 K/UL (ref 1.82–7.42)
NEUTROPHILS NFR BLD: 58.2 % (ref 44–72)
NRBC # BLD AUTO: 0 K/UL
NRBC BLD-RTO: 0 /100 WBC
PLATELET # BLD AUTO: 235 K/UL (ref 164–446)
PMV BLD AUTO: 9.9 FL (ref 9–12.9)
POTASSIUM SERPL-SCNC: 3.8 MMOL/L (ref 3.6–5.5)
RBC # BLD AUTO: 3.95 M/UL (ref 4.7–6.1)
SODIUM SERPL-SCNC: 135 MMOL/L (ref 135–145)
VANCOMYCIN TROUGH SERPL-MCNC: 6.9 UG/ML (ref 10–20)
WBC # BLD AUTO: 6.7 K/UL (ref 4.8–10.8)

## 2019-08-23 PROCEDURE — 700102 HCHG RX REV CODE 250 W/ 637 OVERRIDE(OP): Performed by: INTERNAL MEDICINE

## 2019-08-23 PROCEDURE — 80202 ASSAY OF VANCOMYCIN: CPT

## 2019-08-23 PROCEDURE — 700105 HCHG RX REV CODE 258: Performed by: FAMILY MEDICINE

## 2019-08-23 PROCEDURE — 83036 HEMOGLOBIN GLYCOSYLATED A1C: CPT

## 2019-08-23 PROCEDURE — 700102 HCHG RX REV CODE 250 W/ 637 OVERRIDE(OP): Performed by: FAMILY MEDICINE

## 2019-08-23 PROCEDURE — A9270 NON-COVERED ITEM OR SERVICE: HCPCS | Performed by: INTERNAL MEDICINE

## 2019-08-23 PROCEDURE — 770006 HCHG ROOM/CARE - MED/SURG/GYN SEMI*

## 2019-08-23 PROCEDURE — A9270 NON-COVERED ITEM OR SERVICE: HCPCS | Performed by: FAMILY MEDICINE

## 2019-08-23 PROCEDURE — 700111 HCHG RX REV CODE 636 W/ 250 OVERRIDE (IP): Performed by: FAMILY MEDICINE

## 2019-08-23 PROCEDURE — 700102 HCHG RX REV CODE 250 W/ 637 OVERRIDE(OP): Performed by: HOSPITALIST

## 2019-08-23 PROCEDURE — 700102 HCHG RX REV CODE 250 W/ 637 OVERRIDE(OP): Performed by: STUDENT IN AN ORGANIZED HEALTH CARE EDUCATION/TRAINING PROGRAM

## 2019-08-23 PROCEDURE — 82962 GLUCOSE BLOOD TEST: CPT | Mod: 91

## 2019-08-23 PROCEDURE — 36415 COLL VENOUS BLD VENIPUNCTURE: CPT

## 2019-08-23 PROCEDURE — 99232 SBSQ HOSP IP/OBS MODERATE 35: CPT | Performed by: INTERNAL MEDICINE

## 2019-08-23 PROCEDURE — 80048 BASIC METABOLIC PNL TOTAL CA: CPT

## 2019-08-23 PROCEDURE — 85025 COMPLETE CBC W/AUTO DIFF WBC: CPT

## 2019-08-23 PROCEDURE — A9270 NON-COVERED ITEM OR SERVICE: HCPCS | Performed by: HOSPITALIST

## 2019-08-23 RX ORDER — INSULIN GLARGINE 100 [IU]/ML
10 INJECTION, SOLUTION SUBCUTANEOUS EVERY EVENING
Status: DISCONTINUED | OUTPATIENT
Start: 2019-08-23 | End: 2019-08-24

## 2019-08-23 RX ADMIN — SODIUM HYPOCHLORITE 473 ML: 1.25 SOLUTION TOPICAL at 05:46

## 2019-08-23 RX ADMIN — ENOXAPARIN SODIUM 40 MG: 100 INJECTION SUBCUTANEOUS at 05:42

## 2019-08-23 RX ADMIN — INSULIN HUMAN 5 UNITS: 100 INJECTION, SOLUTION PARENTERAL at 20:40

## 2019-08-23 RX ADMIN — INSULIN HUMAN 2 UNITS: 100 INJECTION, SOLUTION PARENTERAL at 11:00

## 2019-08-23 RX ADMIN — INSULIN GLARGINE 10 UNITS: 100 INJECTION, SOLUTION SUBCUTANEOUS at 17:26

## 2019-08-23 RX ADMIN — LINEZOLID 600 MG: 600 TABLET, FILM COATED ORAL at 17:27

## 2019-08-23 RX ADMIN — INSULIN HUMAN 2 UNITS: 100 INJECTION, SOLUTION PARENTERAL at 06:18

## 2019-08-23 RX ADMIN — SENNOSIDES, DOCUSATE SODIUM 2 TABLET: 50; 8.6 TABLET, FILM COATED ORAL at 17:27

## 2019-08-23 RX ADMIN — PIPERACILLIN AND TAZOBACTAM 4.5 G: 4; .5 INJECTION, POWDER, LYOPHILIZED, FOR SOLUTION INTRAVENOUS; PARENTERAL at 05:43

## 2019-08-23 RX ADMIN — SODIUM HYPOCHLORITE 1 ML: 1.25 SOLUTION TOPICAL at 23:43

## 2019-08-23 RX ADMIN — PIPERACILLIN AND TAZOBACTAM 4.5 G: 4; .5 INJECTION, POWDER, LYOPHILIZED, FOR SOLUTION INTRAVENOUS; PARENTERAL at 20:44

## 2019-08-23 RX ADMIN — INSULIN HUMAN 3 UNITS: 100 INJECTION, SOLUTION PARENTERAL at 17:26

## 2019-08-23 RX ADMIN — SODIUM HYPOCHLORITE 1 ML: 1.25 SOLUTION TOPICAL at 17:33

## 2019-08-23 RX ADMIN — ATORVASTATIN CALCIUM 80 MG: 20 TABLET, FILM COATED ORAL at 20:44

## 2019-08-23 RX ADMIN — LINEZOLID 600 MG: 600 TABLET, FILM COATED ORAL at 05:42

## 2019-08-23 RX ADMIN — ASPIRIN 81 MG: 81 TABLET, COATED ORAL at 05:42

## 2019-08-23 RX ADMIN — PIPERACILLIN AND TAZOBACTAM 4.5 G: 4; .5 INJECTION, POWDER, LYOPHILIZED, FOR SOLUTION INTRAVENOUS; PARENTERAL at 14:23

## 2019-08-23 ASSESSMENT — ENCOUNTER SYMPTOMS
SENSORY CHANGE: 0
FEVER: 0
COUGH: 0
MYALGIAS: 1
VOMITING: 0
NAUSEA: 0
SHORTNESS OF BREATH: 0
SPEECH CHANGE: 0

## 2019-08-23 NOTE — PROGRESS NOTES
Bedside shift report received from night RN.  Assumed care at 0715, patient awake in bed, belongings and call light within reach, PIV assessed CDI, dressing drain visualized CDI. Needs met at this time.    Reviewed current POC with patient. POC review with CNA including vital sign frequency/drains/mobility.  Labs, notes, orders reviewed at this time.

## 2019-08-23 NOTE — PROGRESS NOTES
[facility] PROGRESS NOTE     Attending:   Albina Munoz MD    Resident:   Floyd Magana DO, MPH    PATIENT:   Israel Aviles; [id number]; 1965    ID:   Israel is a 54yo type 2 diabetic male admitted for osteomyelitis of the L foot. LPS, ID, and Vascular Surgery currently consulted. L foot ulceration began 3/2019 and has been followed by wound care. Pt has had a thrombectomy 3/2019 w/ Dr. Chatterjee. Reportedly to have poor compliance w/ antibiotics, possible confusion. He has trialed Bactrim and Augmentin. MRI performed confirmed osteomyelitis of the 1st metatarsal phalanx, sesamoid bones, and septic arthropathy 1st MTP joint. Family weighing options at this time and pt currently on Zyvox and Zosyn. [facility] will be taking over care from hospitalist service and appreciate the assistance and care they have provided regarding the care of the pt.     SUBJECTIVE:   Reportedly no acute overnight events. RN reports that pt and family are still processing the fact that the pt may have to have amputation. Pt's family report that they pt would choose to die rather than to lose a limb. He remains afebrile and still has abx running. Pt's family questioning the efficacy of hyperbaric oxygen therapy for wound healing and preservation.     OBJECTIVE:  Vitals:    08/22/19 1608 08/22/19 2100 08/23/19 0500 08/23/19 0805   BP: 144/78 (!) 161/90 125/77 131/77   Pulse: 89 88 83 88   Resp: 18 18 15 17   Temp: 36.3 °C (97.3 °F) 36.6 °C (97.8 °F) 37.1 °C (98.8 °F) 36.1 °C (97 °F)   TempSrc: Temporal Temporal Temporal Temporal   SpO2: 98% 97% 96% 97%   Weight:           Intake/Output Summary (Last 24 hours) at 8/23/2019 1253  Last data filed at 8/23/2019 0900  Gross per 24 hour   Intake 240 ml   Output --   Net 240 ml       PHYSICAL EXAM:  General: No acute distress, afebrile, resting comfortably in bed  HEENT: NC/AT. EOMI. MMM, neck supple without adenopathy or mass  Cardiovascular: RRR, NMGR, cap refill  brisk.  Respiratory: CTAB, no tachypnea or retractions  Abdomen: soft, NT/ND, no masses  EXT:  SILVA, edema noted to the L 1st, 2nd, and 3rd distal toes, ulceration noted to the dorsal aspect of the 3rd toe, edema noted to the 1st MTP joint w/ ulceration and necrotic tissue, TTP throughout the L foot  Skin: No rashes or lesions other than noted in extremities  Neuro: Non-focal    LABS:  Recent Labs     08/21/19  1203 08/22/19 0353 08/23/19  0757   WBC 7.5 6.2 6.7   RBC 3.94* 3.97* 3.95*   HEMOGLOBIN 11.8* 11.4* 11.0*   HEMATOCRIT 36.0* 36.1* 35.5*   MCV 91.4 90.9 89.9   MCH 29.9 28.7 27.8   RDW 45.0 45.0 45.1   PLATELETCT 244 233 235   MPV 10.0 9.9 9.9   NEUTSPOLYS 74.90* 62.40 58.20   LYMPHOCYTES 18.30* 27.60 30.10   MONOCYTES 6.00 9.00 9.20   EOSINOPHILS 0.40 0.60 1.60   BASOPHILS 0.30 0.20 0.60     Recent Labs     08/21/19  1203 08/22/19  0353 08/23/19  0757   SODIUM 136 139 135   POTASSIUM 4.4 4.0 3.8   CHLORIDE 101 105 103   CO2 25 26 27   BUN 16 12 10   CREATININE 0.75 0.65 0.74   CALCIUM 9.2 9.1 9.0   ALBUMIN  --  3.5  --      Estimated GFR/CRCL = Estimated Creatinine Clearance: 122.5 mL/min (by C-G formula based on SCr of 0.74 mg/dL).  Recent Labs     08/21/19  1203  08/22/19  0353  08/22/19 2028 08/23/19  0559 08/23/19  0757 08/23/19  1005   GLUCOSE 223*  --  163*  --   --   --  184*  --    POCGLUCOSE  --    < >  --    < > 171* 195*  --  164*    < > = values in this interval not displayed.     Recent Labs     08/21/19  1203 08/22/19  0353   ASTSGOT  --  11*   ALTSGPT  --  14   TBILIRUBIN  --  0.4   ALKPHOSPHAT  --  62   GLOBULIN  --  4.5*   INR 0.97  --          Recent Labs     08/21/19  1203   INR 0.97   APTT 30.6     IMAGING:  No new imaging    MEDS:  Current Facility-Administered Medications   Medication Last Dose   • atorvastatin (LIPITOR) tablet 80 mg 80 mg at 08/22/19 2029   • dakins 0.125% (1/4 strength) topical soln 473 mL at 08/23/19 0588   • linezolid (ZYVOX) tablet 600 mg 600 mg at 08/23/19 0541    • senna-docusate (PERICOLACE or SENOKOT S) 8.6-50 MG per tablet 2 Tab      And   • polyethylene glycol/lytes (MIRALAX) PACKET 1 Packet      And   • magnesium hydroxide (MILK OF MAGNESIA) suspension 30 mL      And   • bisacodyl (DULCOLAX) suppository 10 mg     • enoxaparin (LOVENOX) inj 40 mg 40 mg at 08/23/19 0542   • enalaprilat (VASOTEC) injection 1.25 mg     • ondansetron (ZOFRAN) syringe/vial injection 4 mg     • ondansetron (ZOFRAN ODT) dispertab 4 mg     • promethazine (PHENERGAN) tablet 12.5-25 mg     • promethazine (PHENERGAN) suppository 12.5-25 mg     • prochlorperazine (COMPAZINE) injection 5-10 mg     • acetaminophen (TYLENOL) tablet 650 mg     • insulin regular (HUMULIN R) injection 2-9 Units 2 Units at 08/23/19 1100    And   • glucose 4 g chewable tablet 16 g      And   • DEXTROSE 10% BOLUS 250 mL     • piperacillin-tazobactam (ZOSYN) 4.5 g in  mL IVPB Stopped at 08/23/19 0943   • oxyCODONE immediate-release (ROXICODONE) tablet 5 mg     • aspirin EC (ECOTRIN) tablet 81 mg 81 mg at 08/23/19 0542       ASSESSMENT/PLAN:  Israel is a 54yo male w/ a pmhx of poorly controlled DM, type 2 and PAD admitted to the hospital due to osteomyelitis of the L foot. Currently on Zyvox and Zosyn abx w/ surgical recommendation for BKA. Pt currently not wanting BKA and is leaning towards course of IV abx.     #Osteomyelitis of the L foot  #DFU  X-ray performed in ED on admission demonstrating abnormal lucency noted to the first MTP joint suggesting osteomyelitis  MRI obtained on hospitalization demonstrating osteomyelitis of the first metatarsal and septic arthropathy the first MTP joint also increased edema and most likely septic arthropathy of the TMT joint  Seen by vascular surgery, recommends BKA  Patient initially started on Zosyn and vancomycin, however vancomycin stopped and Zyvox initiated in its place  Infectious disease consulted, appreciate recs  Blood cultures x2 obtained, however were obtained after  initiation of antibiotics  WBC on presentation 7.5 with left shift of 74.9  WBC continues to remain normal at 6.7, no left shift  CRP of 1.22 and sed rate of 71  Patient and family currently declining BKA and adamant of course of antibiotics    Plan:  Continue Zyvox and Zosyn abx  CBC and CMP in morning  Follow-up blood and wound cultures  Vitals signs per floor protocol  Continue infectious disease recommendations  Follow-up vascular/orthopedic surgery recs  Continue education regarding amputation to family  Wound care  LPS recs      #Diabetes mellitus type 2  Currently on insulin sliding scale  Was taking glipizide outpatient in addition to Metformin  Has [facility] endocrinology at [facility]  Most recent hemoglobin A1c 8.9% in June    Plan:  Hold glipizide and Metformin at this time  Insulin sliding scale  Hypoglycemia protocol  We will start Lantus 10 units nightly  Continue to monitor patient's blood glucose levels  F/u HgbA1c      #Normocytic Anemia  Currently stable w/ Hgb&Hct of ~11 and ~36, respectively  At baseline  Asymptomatic    Plan:  CTM sxs  CBC in AM      #Perpheral Arterial Disease  Thrombectomy procedure performed in March of 2019  Currently on Plavix and Statin    Plan:  Cont Plavix and Statin        Core Measures:  Lines: PIV  Tubes: None  Diet: Diabetic  Abx: Zyvox and Zosyn  DVT Ppx: Lovenox  GI Ppx: Diet  PCP: Mj Bai MD  CODE STATUS: Full Code      Dispo: Inpatient for IV abx and possible L BKA.      Floyd Magana DO, MPH (PGY-3)

## 2019-08-23 NOTE — DISCHARGE PLANNING
Care Transition Team Assessment    Information Source  Information Given By: Patient, Relative  Who is responsible for making decisions for patient? : Patient    Readmission Evaluation  Is this a readmission?: No    Elopement Risk  Legal Hold: No  Ambulatory or Self Mobile in Wheelchair: Yes  Disoriented: No  Psychiatric Symptoms: None  History of Wandering: No  Elopement this Admit: No  Vocalizing Wanting to Leave: No  Displays Behaviors, Body Language Wanting to Leave: No-Not at Risk for Elopement  Elopement Risk: Not at Risk for Elopement    Interdisciplinary Discharge Planning  Patient or legal guardian wants to designate a caregiver (see row info): No    Discharge Preparedness  What is your plan after discharge?: Home with help  What are your discharge supports?: Spouse  Prior Functional Level: Ambulatory    Functional Assesment  Prior Functional Level: Ambulatory         Vision / Hearing Impairment  Vision Impairment : No  Hearing Impairment : No              Domestic Abuse  Have you ever been the victim of abuse or violence?: No  Physical Abuse or Sexual Abuse: No  Verbal Abuse or Emotional Abuse: No  Possible Abuse Reported to:: Not Applicable    Psychological Assessment  History of Substance Abuse: None  History of Psychiatric Problems: No  Non-compliant with Treatment: No  Newly Diagnosed Illness: No    Discharge Risks or Barriers  Discharge risks or barriers?: Non-adherence to medication or treatment    Anticipated Discharge Information  Anticipated discharge disposition: Home

## 2019-08-23 NOTE — PROGRESS NOTES
Infectious Disease Progress Note    Author: Mechelle Reveles M.D. Date & Time of service: 2019  5:58 AM    Chief Complaint:  Diabetic foot infection    Interval History:  2019 no fevers.  W BC 6.7 foot wound is showing staph aureus  Labs Reviewed.    Review of Systems:  Review of Systems   Constitutional: Positive for malaise/fatigue. Negative for fever.   HENT: Negative for hearing loss.    Respiratory: Negative for cough and shortness of breath.    Cardiovascular: Negative for chest pain and leg swelling.   Gastrointestinal: Negative for nausea and vomiting.   Genitourinary: Negative for dysuria.   Musculoskeletal: Positive for myalgias.   Neurological: Negative for sensory change and speech change.       Hemodynamics:  Temp (24hrs), Av.7 °C (98.1 °F), Min:36.3 °C (97.3 °F), Max:37.1 °C (98.8 °F)  Temperature: 37.1 °C (98.8 °F)  Pulse  Av.5  Min: 83  Max: 108   Blood Pressure: 125/77       Physical Exam:  Physical Exam   Constitutional: He is oriented to person, place, and time. No distress.   HENT:   Mouth/Throat: No oropharyngeal exudate.   Eyes: Pupils are equal, round, and reactive to light. No scleral icterus.   Neck: Neck supple.   Cardiovascular: Regular rhythm.   No murmur heard.  Pulmonary/Chest: He has no wheezes. He has no rales.   Abdominal: Soft. There is no tenderness. There is no rebound and no guarding.   Musculoskeletal: He exhibits no edema or tenderness.   Left foot second and third toe have ulcers  Great toe is swollen  Wound on the medial surface with bone visible   Lymphadenopathy:     He has no cervical adenopathy.   Neurological: He is alert and oriented to person, place, and time.   No focal neurological deficit   Skin: Skin is warm. No erythema.   Vitals reviewed.      Meds:    Current Facility-Administered Medications:   •  atorvastatin  •  dakins 0.125% (1/4 strength)  •  linezolid  •  senna-docusate **AND** polyethylene glycol/lytes **AND** magnesium hydroxide  **AND** bisacodyl  •  enoxaparin  •  enalaprilat  •  ondansetron  •  ondansetron  •  promethazine  •  promethazine  •  prochlorperazine  •  acetaminophen  •  insulin regular **AND** Accu-Chek ACHS **AND** NOTIFY MD and PharmD **AND** glucose **AND** dextrose 10% bolus  •  [COMPLETED] piperacillin-tazobactam **AND** piperacillin-tazobactam  •  oxyCODONE immediate-release  •  aspirin    Labs:  Recent Labs     08/21/19  1203 08/22/19  0353   WBC 7.5 6.2   RBC 3.94* 3.97*   HEMOGLOBIN 11.8* 11.4*   HEMATOCRIT 36.0* 36.1*   MCV 91.4 90.9   MCH 29.9 28.7   RDW 45.0 45.0   PLATELETCT 244 233   MPV 10.0 9.9   NEUTSPOLYS 74.90* 62.40   LYMPHOCYTES 18.30* 27.60   MONOCYTES 6.00 9.00   EOSINOPHILS 0.40 0.60   BASOPHILS 0.30 0.20     Recent Labs     08/21/19  1203 08/22/19  0353   SODIUM 136 139   POTASSIUM 4.4 4.0   CHLORIDE 101 105   CO2 25 26   GLUCOSE 223* 163*   BUN 16 12     Recent Labs     08/21/19  1203 08/22/19  0353   ALBUMIN  --  3.5   TBILIRUBIN  --  0.4   ALKPHOSPHAT  --  62   TOTPROTEIN  --  8.0   ALTSGPT  --  14   ASTSGOT  --  11*   CREATININE 0.75 0.65       Imaging:  Dx-foot-complete 3+ Left    Result Date: 8/21/2019 8/21/2019 11:55 AM HISTORY/REASON FOR EXAM:  Atraumatic Pain/Swelling/Deformity; ? osteomyelitis Swelling, ulcer, diabetes TECHNIQUE/EXAM DESCRIPTION AND NUMBER OF VIEWS: 3 views of the LEFT foot. COMPARISON:  8/16/2019 FINDINGS: Redemonstrated is soft tissue defect along the medial aspect of the foot at the first MTP joint. There is abnormal lucency involving the first metatarsal head and the proximal phalangeal base which is suspicious for osteomyelitis and possibly septic arthritis. There is significant joint space narrowing of the MTP joint. No acute fracture or dislocation is seen. Alignment is anatomic. Small Achilles and plantar calcaneal spurs. There are atherosclerotic arterial calcifications.     1.  Soft tissue swelling and defect about the great toe MTP joint. 2.  Abnormal lucency is  again noted about the first MTP joint suggestive of osteomyelitis/infection. Further evaluation with MRI without and with contrast is suggested.    Dx-foot-complete 3+ Left    Result Date: 8/16/2019 8/16/2019 4:11 PM HISTORY/REASON FOR EXAM:  Rule out osteomyelitis TECHNIQUE/EXAM DESCRIPTION AND NUMBER OF VIEWS: 3 views of the LEFT foot. COMPARISON:  5/17/2019. FINDINGS:  There is indistinctness of the cortical surfaces first metatarsal-phalangeal joint which are suggestive of osteomyelitis. Bony alignment is anatomic. There is a large soft tissue defect over the dorsal and medial surface of the first metatarsophalangeal joint. No acute fracture or dislocation is identified.     1.  Indistinctness of the cortical surfaces first metatarsal-phalangeal joint which are suggestive of osteomyelitis. Consider MRI with and without contrast for further evaluation. 2.  Large soft tissue defect of the dorsal and medial surface of the first metatarsophalangeal joint.    Mr-foot-with & W/o Left    Result Date: 8/22/2019 8/21/2019 9:44 PM HISTORY/REASON FOR EXAM:  Diabetes with soft tissue infection TECHNIQUE/EXAM DESCRIPTION: MRI of the LEFT foot with and without contrast. Using a [id number].248 [employer] Signa 1.5 Luisa MRI scanner, T1 axial, sagittal, and coronal, fast spin-echo T2 fat-suppressed axial and coronal, fast inversion recovery sagittal, and T1 fat suppressed axial and sagittal post intravenous contrast images were obtained. A total of 15 mL ProHance given. COMPARISON:  Radiographs 8/21/2019 FINDINGS: The distal aspect of the phalanges are not well seen due to some artifact, inhomogeneous fat saturation First metatarsal-phalangeal centered increased T2, decreased T1 medullary space signals identified. The entire first metatarsus is affected as is the proximal phalanx. There is an metatarsal-phalangeal effusion. There is also slight edema and enhancement  in the medial cuneiform. No effusion. The first metatarsal head sesamoids are  edematous and there is bony destruction posteriorly in the lateral sesamoid. There is also bony destruction involving the lateral aspect of the first metatarsal-phalangeal joint. Adjacent skin ulceration is seen medial to the metatarsal-phalangeal joint. No soft tissue abscess is detected. There is a moderate forefoot skin thickening and soft tissue enhancement indicating cellulitis and phlegmonous change. No other bony destruction is seen but there is some edema in the adjacent second metatarsal head. This enhances coronal image 34. There is slight associated decreased T1 signal precontrast. No joint effusion is confirmed. No ligament or tendon tear is confirmed. No retraction noted. There is plantar fat pad decreased T1, mildly increased T2 and enhanced T1 signal overlying the calcaneus suggesting post contusion related change. Plantar fascia is moderately thickened and there is a calcaneus spur.     1.  First metatarsal-phalangeal joint septic arthropathy 2.  Osteomyelitis affecting the entire first metatarsal, proximal phalanx and sesamoids. Edema and enhancement in the adjacent medial cuneiform could be reactive or secondary to early septic arthropathy and osteomyelitis centered at the first TMT joint 3.  Suspicious for early second medial metatarsal head osteomyelitis. Septic arthropathy cannot be excluded 4.  Medial first metatarsal-phalangeal centered greater than dorsal medial forefoot skin ulceration with cellulitis and phlegmonous change. No abscess identified 5.  Second metatarsal shaft edema is more likely from stress response than infection 6.  Plantar fasciitis    Us-alyssa Single Level Bilat    Result Date: 2019   Vascular Laboratory  Conclusions  Monophasic  waveforms with low amplitude, right greater than left.  Decreased ALYSSA on the right. Findings are suggestive of significant arterial  disease  TRELL CASTLE  Age:    53    Gender:     M  MRN:    [id number]  :    1965      BSA:  Exam  Date:     08/22/2019 16:01  Room #:     Inpatient  Priority:     Routine  Ht (in):             Wt (lb):  Ordering Physician:        JENNIFER CLEANING  Referring Physician:  Sonographer:               Nettie Guzman RDCS, RVT  Study Type:                Complete Bilateral  Technical Quality:         Adequate  Indications:     Ulceration of LE  CPT Codes:       86349  ICD Codes:       707.1  History:         Ulcerations  Limitations:                 RIGHT  Waveform            Systolic BPs (mmHg)                             137           Brachial                                           Common Femoral  Monophasic                 97            Posterior Tibial  Monophasic                 101           Dorsalis Pedis                                           Peroneal                             0.74          ALYSSA                                           TBI                       LEFT  Waveform        Systolic BPs (mmHg)                             132           Brachial                                           Common Femoral  Monophasic                 152           Posterior Tibial  Monophasic                 168           Dorsalis Pedis                                           Peroneal                             1.23          ALYSSA                                           TBI  Findings  Right.  Doppler waveforms at the ankle are monophasic with low  amplitude.  Ankle-brachial index is moderately reduced.  Left.  Doppler waveforms at the ankle are monophasic with  low amplitude.  Ankle-brachial index is falsly normal.  Mitzi Diggs  (Electronically Signed)  Final Date:      22 August 2019                   16:44    Us-extremity Artery Lower Bilat    Result Date: 8/22/2019  Lower Extremity  Arterial Duplex Report  Vascular Laboratory  CONCLUSIONS  Diffuse, laminar lower extremity arterial plaquing and calcium noted.  Bilaterally dampened distal waveforms suggesive of distal aterial  disease  as detailed.  No noted focal arterial stenosis.  Compared with the prior study of May - it appears revascularization has  occurred in the interim.  TRELL CASTLE  Exam Date:     2019 17:58  Room #:     Inpatient  Priority:     Routine  Ht (in):             Wt (lb):  Ordering Physician:        JENNIFER CLEANING  Referring Physician:  Sonographer:               Chas Jo RVT, RDMS  Study Type:                Complete Bilateral  Technical Quality:         Adequate  Age:    53    Gender:     M  MRN:    [id number]  :    1965      BSA:  Indications:     PVD  CPT Codes:       31701  ICD Codes:       443.9  History:         Prior duplex on 19. Non-healing ulcerations on both                   feet.  Limitations:     Dropout due to calcification of vessels.                RIGHT  Waveform        Peak Systolic Velocity (cm/s)                  Prox    Prox-Mid  Mid    Mid-Dist  Distal  Triphasic                         91                       CFA  Triphasic       92                                         PFA  Triphasic       70                89               58      SFA  Triphasic                         54                       POP  Monophasic      30                                 16      AT  Monophasic      57                                 55      PT  Monophasic      104                                28      KAYKAY                LEFT  Waveform        Peak Systolic Velocity (cm/s)                  Prox    Prox-Mid  Mid    Mid-Dist  Distal  Triphasic                         68                       CFA  Triphasic       69                                         PFA  Triphasic       72                84               63      SFA  Biphasic                          92                       POP  Monophasic      68                                 53      AT  Monophasic      37                                 41      PT  Monophasic      118                               "  10      KAYKAY  FINDINGS  Right.  Mild plaque and calcifications throughout the extremity.  Triphasic waveforms and normal velocities seen from the common femoral to  the popliteal artery.  Possible ulceration of distal femoral artery vs. artifact.  Monophasic waveforms in the posterior tibial, anterior tibial and peroneal  arteries.  Left.  Mild plaque and calcifications throughout the extremity.  Triphasic waveforms with normal velocities seen from the common femoral to  the popliteal artery.  Monophasic waveforms found in the posterior tibial, anterior tibial, and  peroneal arteries.  Severely dampened waveform in the distal peroneal artery.  Shanda Tobias M.D.  (Electronically Signed)  Final Date:      22 August 2019                   20:37      Micro:  Results     Procedure Component Value Units Date/Time    BLOOD CULTURE [[id number]] Collected:  08/22/19 1203    Order Status:  Completed Specimen:  Blood from Peripheral Updated:  08/22/19 1219    Narrative:       Per Hospital Policy: Only change Specimen Src: to \"Line\" if  specified by physician order.    BLOOD CULTURE [[id number]] Collected:  08/22/19 1203    Order Status:  Completed Specimen:  Blood from Peripheral Updated:  08/22/19 1219    Narrative:       Per Hospital Policy: Only change Specimen Src: to \"Line\" if  specified by physician order.    GRAM STAIN [[id number]] Collected:  08/22/19 0617    Order Status:  Completed Specimen:  Wound Updated:  08/22/19 1215     Significant Indicator .     Source WND     Site LEFT FOOT     Gram Stain Result Few WBCs.  Rare Gram positive cocci.      CULTURE WOUND W/ GRAM STAIN [[id number]] Collected:  08/22/19 0617    Order Status:  Completed Specimen:  Wound from Left Foot Updated:  08/22/19 0627          Assessment:  Active Hospital Problems    Diagnosis   • *Osteomyelitis of left foot (HCC) [M86.9]   • Peripheral arterial disease (HCC) [I73.9]   • Normocytic anemia [D64.9]   • Diabetic foot (HCC) [E11.8]   • " Uncontrolled type 2 diabetes mellitus with hyperglycemia (HCC) [E11.65]       Plan:  Diabetic foot infection  Has osteomyelitis  Cultures are staph aureus  Continue with the present antibiotics  Will probably end up needing at least a TMA    Peripheral vascular disease  Status post revascularization    Uncontrolled diabetes mellitus  Control the blood sugars for wound healing  Discussed with internal medicine.

## 2019-08-23 NOTE — PROGRESS NOTES
[facility] Family medicine team contacted me this a.m. The patient follows in [facility] clinic.  They will kindly take over the hospitalist portion of this patient's care.

## 2019-08-24 LAB
BACTERIA WND AEROBE CULT: ABNORMAL
BASOPHILS # BLD AUTO: 0.5 % (ref 0–1.8)
BASOPHILS # BLD: 0.03 K/UL (ref 0–0.12)
EOSINOPHIL # BLD AUTO: 0.11 K/UL (ref 0–0.51)
EOSINOPHIL NFR BLD: 1.9 % (ref 0–6.9)
ERYTHROCYTE [DISTWIDTH] IN BLOOD BY AUTOMATED COUNT: 45.8 FL (ref 35.9–50)
GLUCOSE BLD-MCNC: 154 MG/DL (ref 65–99)
GLUCOSE BLD-MCNC: 188 MG/DL (ref 65–99)
GLUCOSE BLD-MCNC: 343 MG/DL (ref 65–99)
GRAM STN SPEC: ABNORMAL
HCT VFR BLD AUTO: 36.5 % (ref 42–52)
HGB BLD-MCNC: 11.1 G/DL (ref 14–18)
IMM GRANULOCYTES # BLD AUTO: 0.01 K/UL (ref 0–0.11)
IMM GRANULOCYTES NFR BLD AUTO: 0.2 % (ref 0–0.9)
LYMPHOCYTES # BLD AUTO: 1.97 K/UL (ref 1–4.8)
LYMPHOCYTES NFR BLD: 33.8 % (ref 22–41)
MCH RBC QN AUTO: 27.6 PG (ref 27–33)
MCHC RBC AUTO-ENTMCNC: 30.4 G/DL (ref 33.7–35.3)
MCV RBC AUTO: 90.8 FL (ref 81.4–97.8)
MONOCYTES # BLD AUTO: 0.49 K/UL (ref 0–0.85)
MONOCYTES NFR BLD AUTO: 8.4 % (ref 0–13.4)
NEUTROPHILS # BLD AUTO: 3.22 K/UL (ref 1.82–7.42)
NEUTROPHILS NFR BLD: 55.2 % (ref 44–72)
NRBC # BLD AUTO: 0 K/UL
NRBC BLD-RTO: 0 /100 WBC
PLATELET # BLD AUTO: 236 K/UL (ref 164–446)
PMV BLD AUTO: 9.8 FL (ref 9–12.9)
RBC # BLD AUTO: 4.02 M/UL (ref 4.7–6.1)
SIGNIFICANT IND 70042: ABNORMAL
SITE SITE: ABNORMAL
SOURCE SOURCE: ABNORMAL
WBC # BLD AUTO: 5.8 K/UL (ref 4.8–10.8)

## 2019-08-24 PROCEDURE — 700102 HCHG RX REV CODE 250 W/ 637 OVERRIDE(OP): Performed by: FAMILY MEDICINE

## 2019-08-24 PROCEDURE — A9270 NON-COVERED ITEM OR SERVICE: HCPCS | Performed by: INTERNAL MEDICINE

## 2019-08-24 PROCEDURE — 36415 COLL VENOUS BLD VENIPUNCTURE: CPT

## 2019-08-24 PROCEDURE — 99232 SBSQ HOSP IP/OBS MODERATE 35: CPT | Performed by: INTERNAL MEDICINE

## 2019-08-24 PROCEDURE — 700105 HCHG RX REV CODE 258: Performed by: INTERNAL MEDICINE

## 2019-08-24 PROCEDURE — 700102 HCHG RX REV CODE 250 W/ 637 OVERRIDE(OP): Performed by: HOSPITALIST

## 2019-08-24 PROCEDURE — 700102 HCHG RX REV CODE 250 W/ 637 OVERRIDE(OP): Performed by: STUDENT IN AN ORGANIZED HEALTH CARE EDUCATION/TRAINING PROGRAM

## 2019-08-24 PROCEDURE — 700111 HCHG RX REV CODE 636 W/ 250 OVERRIDE (IP): Performed by: INTERNAL MEDICINE

## 2019-08-24 PROCEDURE — A9270 NON-COVERED ITEM OR SERVICE: HCPCS | Performed by: HOSPITALIST

## 2019-08-24 PROCEDURE — 700111 HCHG RX REV CODE 636 W/ 250 OVERRIDE (IP): Performed by: FAMILY MEDICINE

## 2019-08-24 PROCEDURE — A9270 NON-COVERED ITEM OR SERVICE: HCPCS | Performed by: FAMILY MEDICINE

## 2019-08-24 PROCEDURE — 85025 COMPLETE CBC W/AUTO DIFF WBC: CPT

## 2019-08-24 PROCEDURE — 700105 HCHG RX REV CODE 258: Performed by: FAMILY MEDICINE

## 2019-08-24 PROCEDURE — 770001 HCHG ROOM/CARE - MED/SURG/GYN PRIV*

## 2019-08-24 PROCEDURE — 700102 HCHG RX REV CODE 250 W/ 637 OVERRIDE(OP): Performed by: INTERNAL MEDICINE

## 2019-08-24 PROCEDURE — 82962 GLUCOSE BLOOD TEST: CPT | Mod: 91

## 2019-08-24 RX ORDER — INSULIN GLARGINE 100 [IU]/ML
15 INJECTION, SOLUTION SUBCUTANEOUS EVERY EVENING
Status: DISCONTINUED | OUTPATIENT
Start: 2019-08-24 | End: 2019-08-25

## 2019-08-24 RX ORDER — CEFAZOLIN SODIUM 2 G/100ML
2 INJECTION, SOLUTION INTRAVENOUS EVERY 8 HOURS
Status: DISCONTINUED | OUTPATIENT
Start: 2019-08-24 | End: 2019-08-24

## 2019-08-24 RX ADMIN — ENOXAPARIN SODIUM 40 MG: 100 INJECTION SUBCUTANEOUS at 05:31

## 2019-08-24 RX ADMIN — PIPERACILLIN AND TAZOBACTAM 4.5 G: 4; .5 INJECTION, POWDER, LYOPHILIZED, FOR SOLUTION INTRAVENOUS; PARENTERAL at 05:20

## 2019-08-24 RX ADMIN — AMPICILLIN AND SULBACTAM 3 G: 2; 1 INJECTION, POWDER, FOR SOLUTION INTRAVENOUS at 23:17

## 2019-08-24 RX ADMIN — INSULIN GLARGINE 15 UNITS: 100 INJECTION, SOLUTION SUBCUTANEOUS at 18:02

## 2019-08-24 RX ADMIN — ASPIRIN 81 MG: 81 TABLET, COATED ORAL at 05:31

## 2019-08-24 RX ADMIN — LINEZOLID 600 MG: 600 TABLET, FILM COATED ORAL at 05:32

## 2019-08-24 RX ADMIN — CEFAZOLIN SODIUM 2 G: 2 INJECTION, SOLUTION INTRAVENOUS at 14:39

## 2019-08-24 RX ADMIN — INSULIN HUMAN 6 UNITS: 100 INJECTION, SOLUTION PARENTERAL at 21:19

## 2019-08-24 RX ADMIN — AMPICILLIN AND SULBACTAM 3 G: 2; 1 INJECTION, POWDER, FOR SOLUTION INTRAVENOUS at 18:03

## 2019-08-24 RX ADMIN — INSULIN HUMAN 2 UNITS: 100 INJECTION, SOLUTION PARENTERAL at 12:29

## 2019-08-24 RX ADMIN — ATORVASTATIN CALCIUM 80 MG: 20 TABLET, FILM COATED ORAL at 21:18

## 2019-08-24 RX ADMIN — SODIUM HYPOCHLORITE 1 ML: 1.25 SOLUTION TOPICAL at 12:32

## 2019-08-24 RX ADMIN — INSULIN HUMAN 2 UNITS: 100 INJECTION, SOLUTION PARENTERAL at 05:20

## 2019-08-24 ASSESSMENT — ENCOUNTER SYMPTOMS
COUGH: 0
MYALGIAS: 1
FEVER: 0
SHORTNESS OF BREATH: 0
NAUSEA: 0
SENSORY CHANGE: 0
VOMITING: 0
SPEECH CHANGE: 0

## 2019-08-24 NOTE — CARE PLAN
Problem: Communication  Goal: The ability to communicate needs accurately and effectively will improve  Outcome: PROGRESSING AS EXPECTED  Pt utilizes call light and expresses needs appropriately.     Problem: Safety  Goal: Will remain free from injury  Outcome: PROGRESSING AS EXPECTED  Pt abides by safety precautions.   Goal: Will remain free from falls  Outcome: PROGRESSING AS EXPECTED  Pt abides by fall precautions and uses walker for mobility.      Problem: Infection  Goal: Will remain free from infection  Outcome: PROGRESSING AS EXPECTED  Pt is receiving antibiotic therapy as part of his care plan.

## 2019-08-24 NOTE — PROGRESS NOTES
[facility] MEDICINE PROGRESS NOTE     Attending:   Maggie Allan MD    Resident:   Brock Loving DO    PATIENT:   Israel Aviles; [id number]; 1965    ID:   53 y.o. male with history of diabetes type 2, HTN, and CVA admitted for a left foot ulcer and osteomyelitis.  The patient was initially admitted to the hospitalist service at [facility]; however, the patient sees Dr. Mj Bai at [facility].  The [facility] team has taken over the hospitalist service for this patient.    Patient had a left dorsal foot ulcer which began in March 2019 which has been followed by wound care.  The patient was treated with antibiotics temporarily in [facility]; however, when he returned in June his symptoms had not improved.  On June 21, 2019 the patient had several vascular procedures performed by Dr. Coates, including, dorsalis pedis angioplasty, anterior tibial artery angioplasty and arthrectomy, posterior tibial artery angioplasty, and wound debridement.    The patient's wound has progressively worsened since June. MRI on 8/21 showed first MPJ with septic arthropathy, osteomyelitis of first TMT joint. Wound culture positive for staph aureus.     Based on results of MRI, both ID and vascular surgery recommend a below knee amputation.  Patient and his family are reluctant to proceed with a amputation. Currently the patient is on Zyvox and Zosyn IV.  Family are still considering options at this time.      SUBJECTIVE:   No acute events overnight, patient reports some left foot pain a little worse than his baseline. He denies any fever or chills. He reports he is able to bear weight and walk on his left foot. He is interested in going home today. He was explained that long term treatment with IV abx at home would require a PICC line. He said he would discuss this with his family today when they come to the hospital.     OBJECTIVE:  Vitals:    08/23/19 0805 08/23/19 1600 08/23/19 2100 08/24/19 0500   BP: 131/77 132/78 138/77  141/72   Pulse: 88 91 88 85   Resp: 17 18 17 18   Temp: 36.1 °C (97 °F) 36.6 °C (97.9 °F) 36.3 °C (97.3 °F) 36.4 °C (97.6 °F)   TempSrc: Temporal Temporal Temporal Temporal   SpO2: 97% 97% 95% 96%   Weight:       Height:           Intake/Output Summary (Last 24 hours) at 8/24/2019 0601  Last data filed at 8/23/2019 1800  Gross per 24 hour   Intake 480 ml   Output --   Net 480 ml       PHYSICAL EXAM:  General: No acute distress, afebrile, resting comfortably  HEENT: NC/AT. EOMI.   Cardiovascular: RRR without murmurs. Normal capillary refill   Respiratory: CTAB, no tachypnea or retractions  Abdomen: soft, nontender, nondistended, no masses  EXT:  SILVA, no edema. Left foot wrapped in gauze. Examined foot yesterday during dressing changes. Ulcerative and necrotic wound over dorsal lateral left foot extending from tarsals to phalanges and from 3rd to 5th metatarsals   Skin: No erythema/lesions   Neuro: Non-focal    LABS:  Recent Labs     08/22/19  0353 08/23/19  0757 08/24/19  0421   WBC 6.2 6.7 5.8   RBC 3.97* 3.95* 4.02*   HEMOGLOBIN 11.4* 11.0* 11.1*   HEMATOCRIT 36.1* 35.5* 36.5*   MCV 90.9 89.9 90.8   MCH 28.7 27.8 27.6   RDW 45.0 45.1 45.8   PLATELETCT 233 235 236   MPV 9.9 9.9 9.8   NEUTSPOLYS 62.40 58.20 55.20   LYMPHOCYTES 27.60 30.10 33.80   MONOCYTES 9.00 9.20 8.40   EOSINOPHILS 0.60 1.60 1.90   BASOPHILS 0.20 0.60 0.50     Recent Labs     08/21/19  1203 08/22/19  0353 08/23/19  0757   SODIUM 136 139 135   POTASSIUM 4.4 4.0 3.8   CHLORIDE 101 105 103   CO2 25 26 27   BUN 16 12 10   CREATININE 0.75 0.65 0.74   CALCIUM 9.2 9.1 9.0   ALBUMIN  --  3.5  --      Estimated GFR/CRCL = Estimated Creatinine Clearance: 122.5 mL/min (by C-G formula based on SCr of 0.74 mg/dL).  Recent Labs     08/21/19  1203  08/22/19  0353  08/23/19  0757  08/23/19  1647 08/23/19  2040 08/24/19  0519   GLUCOSE 223*  --  163*  --  184*  --   --   --   --    POCGLUCOSE  --    < >  --    < >  --    < > 221* 260* 188*    < > = values in this  interval not displayed.     Recent Labs     08/21/19  1203 08/22/19  0353   ASTSGOT  --  11*   ALTSGPT  --  14   TBILIRUBIN  --  0.4   ALKPHOSPHAT  --  62   GLOBULIN  --  4.5*   INR 0.97  --              Recent Labs     08/21/19  1203   INR 0.97   APTT 30.6         IMAGING:  US-EXTREMITY ARTERY LOWER BILAT   Final Result      US-ALYSSA SINGLE LEVEL BILAT   Final Result      MR-FOOT-WITH & W/O LEFT   Final Result      1.  First metatarsal-phalangeal joint septic arthropathy      2.  Osteomyelitis affecting the entire first metatarsal, proximal phalanx and sesamoids. Edema and enhancement in the adjacent medial cuneiform could be reactive or secondary to early septic arthropathy and osteomyelitis centered at the first TMT joint      3.  Suspicious for early second medial metatarsal head osteomyelitis. Septic arthropathy cannot be excluded      4.  Medial first metatarsal-phalangeal centered greater than dorsal medial forefoot skin ulceration with cellulitis and phlegmonous change. No abscess identified      5.  Second metatarsal shaft edema is more likely from stress response than infection      6.  Plantar fasciitis      DX-FOOT-COMPLETE 3+ LEFT   Final Result      1.  Soft tissue swelling and defect about the great toe MTP joint.   2.  Abnormal lucency is again noted about the first MTP joint suggestive of osteomyelitis/infection. Further evaluation with MRI without and with contrast is suggested.      US-EXTREMITY ARTERY LOWER BILAT    (Results Pending)       MEDS:  Current Facility-Administered Medications   Medication Last Dose   • insulin glargine (LANTUS) injection 10 Units 10 Units at 08/23/19 1726   • atorvastatin (LIPITOR) tablet 80 mg 80 mg at 08/23/19 2044   • dakins 0.125% (1/4 strength) topical soln 1 mL at 08/23/19 2343   • linezolid (ZYVOX) tablet 600 mg 600 mg at 08/24/19 0532   • senna-docusate (PERICOLACE or SENOKOT S) 8.6-50 MG per tablet 2 Tab 2 Tab at 08/23/19 1727    And   • polyethylene  glycol/lytes (MIRALAX) PACKET 1 Packet      And   • magnesium hydroxide (MILK OF MAGNESIA) suspension 30 mL      And   • bisacodyl (DULCOLAX) suppository 10 mg     • enoxaparin (LOVENOX) inj 40 mg 40 mg at 08/24/19 0531   • enalaprilat (VASOTEC) injection 1.25 mg     • ondansetron (ZOFRAN) syringe/vial injection 4 mg     • ondansetron (ZOFRAN ODT) dispertab 4 mg     • promethazine (PHENERGAN) tablet 12.5-25 mg     • promethazine (PHENERGAN) suppository 12.5-25 mg     • prochlorperazine (COMPAZINE) injection 5-10 mg     • acetaminophen (TYLENOL) tablet 650 mg     • insulin regular (HUMULIN R) injection 2-9 Units 2 Units at 08/24/19 0520    And   • glucose 4 g chewable tablet 16 g      And   • DEXTROSE 10% BOLUS 250 mL     • piperacillin-tazobactam (ZOSYN) 4.5 g in  mL IVPB 4.5 g at 08/24/19 0520   • oxyCODONE immediate-release (ROXICODONE) tablet 5 mg     • aspirin EC (ECOTRIN) tablet 81 mg 81 mg at 08/24/19 0531       ASSESSMENT/PLAN:    #Osteomyelitis  # Left Foot wound  -Patient reportedly has had this wound since March 2019  -MRI of the foot shows first metatarsal phalangeal joint with septic arthropathy   -Early second medial metatarsal head osteomyelitis   -Osteomyelitis affecting the entire first metatarsal proximal phalanx and sesamoids   -Osteomyelitis entering the first TMT joint  -He has been on several courses of antibiotics, however, he reports that he did not fill his last prescription of antibiotics because the antibiotics were not at the pharmacy when he went to pick them up  -Patient is currently on Zyvox and Zosyn IV, per ID recommendations   -Wound cultures grew staph aureus  -At this time, ID and vascular surgery of seeing the patient and both recommend a BKA  -Patient and his family are very reluctant to proceed with an amputation at this time, and would like more time to discuss other options   -Yesterday, it was discussed with family that a delay to proceed with amputation may result in  spreading of the infection in the bone or potentially lead to sepsis   -At this time patient is afebrile, has a normal WBC, and blood cultures are negative   -The patient and his family understand the risks involved with not proceeding with an amputation.     PLAN: Patient states he would like to be discharged home. He would like treatment with abx at this time and reevaluate the possibility of an amputation after course of abx. I explained to him that long term outpatient treatment with IV abx would require placement of a PICC line. He said he would like to discuss this further with his family before agreeing.     #Hx of CVA, 06/2018  #Peripheral artery disease   - Patient on aspirin and Plavix outpatient   - S/p multiple vascular interventional procedures (June 2019)   -dorsalis pedis angioplasty   - anterior tibial artery angioplasty and arthrectomy   - posterior tibial artery angioplasty  - Per Dr. Coates of vascular surgery, monophasic right PT, left PT, left DP, and absent DP   - Dr. Coates recommends BKA     #DM2  - Patient has poorly controlled diabetes.   - On Glipizide at home  - Patient's sugars ranging from 150 to 260  - HA1C of 7.7 currently   - Patient on Basal/bolus plus sliding scale here in the hospital for glucose control     #Hyperlipidemia  - Patient's LDL in 06/2018 were elevated at 182  - Patient on Lipitor 40mg at home  - Increased to 80mg while here in hospital     #Anemia, normocytic   - Slightly low at 11.1  - Patient asymptomatic  - Appears to be about patient's baseline since June   - Will continue to monitor       Lines: PIV  Abx: Zyvox and Zosyn   DVT prophylaxis: Lovenox  CODE Status: FULL    Brock Loving,   PGY-1  [facility]

## 2019-08-24 NOTE — PROGRESS NOTES
Infectious Disease Progress Note    Author: Jolie Victoria M.D. Date & Time of service: 2019  12:14 PM    Chief Complaint:  Follow-up for left diabetic foot infection/osteomyelitis    Interval History:  2019 no fevers.  W BC 6.7 foot wound is showing staph aureus   afebrile, WBC 5.8 ongoing left foot pain.  Patient continues to refuse amputation and wants to continue aggressive antibiotic therapy.  Wound culture+ MSSA.  Plan of care with PICC line and 6 weeks of IV antibiotics discussed with patient and family at bedside    Labs Reviewed.    Review of Systems:  Review of Systems   Constitutional: Positive for malaise/fatigue. Negative for fever.   HENT: Negative for hearing loss.    Respiratory: Negative for cough and shortness of breath.    Cardiovascular: Negative for chest pain and leg swelling.   Gastrointestinal: Negative for nausea and vomiting.   Genitourinary: Negative for dysuria.   Musculoskeletal: Positive for myalgias.   Neurological: Negative for sensory change and speech change.       Hemodynamics:  Temp (24hrs), Av.4 °C (97.5 °F), Min:36.3 °C (97.3 °F), Max:36.6 °C (97.9 °F)  Temperature: 36.3 °C (97.3 °F)  Pulse  Av.7  Min: 83  Max: 108   Blood Pressure: 129/78       Physical Exam:  Physical Exam   Constitutional: He is oriented to person, place, and time. No distress.   HENT:   Mouth/Throat: No oropharyngeal exudate.   Eyes: Pupils are equal, round, and reactive to light. No scleral icterus.   Neck: Neck supple.   Cardiovascular: Regular rhythm.   No murmur heard.  Pulmonary/Chest: He has no wheezes. He has no rales.   Abdominal: Soft. There is no tenderness. There is no rebound and no guarding.   Musculoskeletal: He exhibits no edema or tenderness.   Left foot dressed  Wound on the medial surface with bone visible   Lymphadenopathy:     He has no cervical adenopathy.   Neurological: He is alert and oriented to person, place, and time.   No focal neurological deficit    Skin: Skin is warm. No erythema.   Vitals reviewed.      Meds:    Current Facility-Administered Medications:   •  insulin glargine  •  atorvastatin  •  dakins 0.125% (1/4 strength)  •  linezolid  •  senna-docusate **AND** polyethylene glycol/lytes **AND** magnesium hydroxide **AND** bisacodyl  •  enoxaparin  •  enalaprilat  •  ondansetron  •  ondansetron  •  promethazine  •  promethazine  •  prochlorperazine  •  acetaminophen  •  insulin regular **AND** Accu-Chek ACHS **AND** NOTIFY MD and PharmD **AND** glucose **AND** dextrose 10% bolus  •  [COMPLETED] piperacillin-tazobactam **AND** piperacillin-tazobactam  •  oxyCODONE immediate-release  •  aspirin    Labs:  Recent Labs     08/22/19 0353 08/23/19 0757 08/24/19  0421   WBC 6.2 6.7 5.8   RBC 3.97* 3.95* 4.02*   HEMOGLOBIN 11.4* 11.0* 11.1*   HEMATOCRIT 36.1* 35.5* 36.5*   MCV 90.9 89.9 90.8   MCH 28.7 27.8 27.6   RDW 45.0 45.1 45.8   PLATELETCT 233 235 236   MPV 9.9 9.9 9.8   NEUTSPOLYS 62.40 58.20 55.20   LYMPHOCYTES 27.60 30.10 33.80   MONOCYTES 9.00 9.20 8.40   EOSINOPHILS 0.60 1.60 1.90   BASOPHILS 0.20 0.60 0.50     Recent Labs     08/22/19 0353 08/23/19  0757   SODIUM 139 135   POTASSIUM 4.0 3.8   CHLORIDE 105 103   CO2 26 27   GLUCOSE 163* 184*   BUN 12 10     Recent Labs     08/22/19 0353 08/23/19  0757   ALBUMIN 3.5  --    TBILIRUBIN 0.4  --    ALKPHOSPHAT 62  --    TOTPROTEIN 8.0  --    ALTSGPT 14  --    ASTSGOT 11*  --    CREATININE 0.65 0.74       Imaging:  Dx-foot-complete 3+ Left    Result Date: 8/21/2019 8/21/2019 11:55 AM HISTORY/REASON FOR EXAM:  Atraumatic Pain/Swelling/Deformity; ? osteomyelitis Swelling, ulcer, diabetes TECHNIQUE/EXAM DESCRIPTION AND NUMBER OF VIEWS: 3 views of the LEFT foot. COMPARISON:  8/16/2019 FINDINGS: Redemonstrated is soft tissue defect along the medial aspect of the foot at the first MTP joint. There is abnormal lucency involving the first metatarsal head and the proximal phalangeal base which is suspicious for  osteomyelitis and possibly septic arthritis. There is significant joint space narrowing of the MTP joint. No acute fracture or dislocation is seen. Alignment is anatomic. Small Achilles and plantar calcaneal spurs. There are atherosclerotic arterial calcifications.     1.  Soft tissue swelling and defect about the great toe MTP joint. 2.  Abnormal lucency is again noted about the first MTP joint suggestive of osteomyelitis/infection. Further evaluation with MRI without and with contrast is suggested.    Dx-foot-complete 3+ Left    Result Date: 8/16/2019 8/16/2019 4:11 PM HISTORY/REASON FOR EXAM:  Rule out osteomyelitis TECHNIQUE/EXAM DESCRIPTION AND NUMBER OF VIEWS: 3 views of the LEFT foot. COMPARISON:  5/17/2019. FINDINGS:  There is indistinctness of the cortical surfaces first metatarsal-phalangeal joint which are suggestive of osteomyelitis. Bony alignment is anatomic. There is a large soft tissue defect over the dorsal and medial surface of the first metatarsophalangeal joint. No acute fracture or dislocation is identified.     1.  Indistinctness of the cortical surfaces first metatarsal-phalangeal joint which are suggestive of osteomyelitis. Consider MRI with and without contrast for further evaluation. 2.  Large soft tissue defect of the dorsal and medial surface of the first metatarsophalangeal joint.    Mr-foot-with & W/o Left    Result Date: 8/22/2019 8/21/2019 9:44 PM HISTORY/REASON FOR EXAM:  Diabetes with soft tissue infection TECHNIQUE/EXAM DESCRIPTION: MRI of the LEFT foot with and without contrast. Using a [employer] Sign[employer] 1.5 Luisa MRI scanner, T1 axial, sagittal, and coronal, fast spin-echo T2 fat-suppressed axial and coronal, fast inversion recovery sagittal, and T1 fat suppressed axial and sagittal post intravenous contrast images were obtained. A total of 15 mL ProHance given. COMPARISON:  Radiographs 8/21/2019 FINDINGS: The distal aspect of the phalanges are not well seen due to some artifact,  inhomogeneous fat saturation First metatarsal-phalangeal centered increased T2, decreased T1 medullary space signals identified. The entire first metatarsus is affected as is the proximal phalanx. There is an metatarsal-phalangeal effusion. There is also slight edema and enhancement  in the medial cuneiform. No effusion. The first metatarsal head sesamoids are edematous and there is bony destruction posteriorly in the lateral sesamoid. There is also bony destruction involving the lateral aspect of the first metatarsal-phalangeal joint. Adjacent skin ulceration is seen medial to the metatarsal-phalangeal joint. No soft tissue abscess is detected. There is a moderate forefoot skin thickening and soft tissue enhancement indicating cellulitis and phlegmonous change. No other bony destruction is seen but there is some edema in the adjacent second metatarsal head. This enhances coronal image 34. There is slight associated decreased T1 signal precontrast. No joint effusion is confirmed. No ligament or tendon tear is confirmed. No retraction noted. There is plantar fat pad decreased T1, mildly increased T2 and enhanced T1 signal overlying the calcaneus suggesting post contusion related change. Plantar fascia is moderately thickened and there is a calcaneus spur.     1.  First metatarsal-phalangeal joint septic arthropathy 2.  Osteomyelitis affecting the entire first metatarsal, proximal phalanx and sesamoids. Edema and enhancement in the adjacent medial cuneiform could be reactive or secondary to early septic arthropathy and osteomyelitis centered at the first TMT joint 3.  Suspicious for early second medial metatarsal head osteomyelitis. Septic arthropathy cannot be excluded 4.  Medial first metatarsal-phalangeal centered greater than dorsal medial forefoot skin ulceration with cellulitis and phlegmonous change. No abscess identified 5.  Second metatarsal shaft edema is more likely from stress response than infection 6.   Plantar fasciitis    Us-simone Single Level Bilat    Result Date: 2019   Vascular Laboratory  Conclusions  Monophasic  waveforms with low amplitude, right greater than left.  Decreased SIMONE on the right. Findings are suggestive of significant arterial  disease  TRELL CASTLE  Age:    53    Gender:     M  MRN:    [id number]  :    1965      BSA:  Exam Date:     2019 16:01  Room #:     Inpatient  Priority:     Routine  Ht (in):             Wt (lb):  Ordering Physician:        JENNIFER CLEANING  Referring Physician:  Sonographer:               Nettie Guzman RDCS, LILLIET  Study Type:                Complete Bilateral  Technical Quality:         Adequate  Indications:     Ulceration of LE  CPT Codes:       64424  ICD Codes:       707.1  History:         Ulcerations  Limitations:                 RIGHT  Waveform            Systolic BPs (mmHg)                             137           Brachial                                           Common Femoral  Monophasic                 97            Posterior Tibial  Monophasic                 101           Dorsalis Pedis                                           Peroneal                             0.74          ISMONE                                           TBI                       LEFT  Waveform        Systolic BPs (mmHg)                             132           Brachial                                           Common Femoral  Monophasic                 152           Posterior Tibial  Monophasic                 168           Dorsalis Pedis                                           Peroneal                             1.23          SIMONE                                           TBI  Findings  Right.  Doppler waveforms at the ankle are monophasic with low  amplitude.  Ankle-brachial index is moderately reduced.  Left.  Doppler waveforms at the ankle are monophasic with  low amplitude.  Ankle-brachial index is falsly normal.  Mitzi PATRICK  Dontae  (Electronically Signed)  Final Date:      2019                   16:44    Us-extremity Artery Lower Bilat    Result Date: 2019  Lower Extremity  Arterial Duplex Report  Vascular Laboratory  CONCLUSIONS  Diffuse, laminar lower extremity arterial plaquing and calcium noted.  Bilaterally dampened distal waveforms suggesive of distal aterial disease  as detailed.  No noted focal arterial stenosis.  Compared with the prior study of May - it appears revascularization has  occurred in the interim.  TRELL CASTLE  Exam Date:     2019 17:58  Room #:     Inpatient  Priority:     Routine  Ht (in):             Wt (lb):  Ordering Physician:        JENNIFER CLEANING  Referring Physician:  Sonographer:               Chas Jo RVT, RDMS  Study Type:                Complete Bilateral  Technical Quality:         Adequate  Age:    53    Gender:     M  MRN:    [id number]  :    1965      BSA:  Indications:     PVD  CPT Codes:       93640  ICD Codes:       443.9  History:         Prior duplex on 19. Non-healing ulcerations on both                   feet.  Limitations:     Dropout due to calcification of vessels.                RIGHT  Waveform        Peak Systolic Velocity (cm/s)                  Prox    Prox-Mid  Mid    Mid-Dist  Distal  Triphasic                         91                       CFA  Triphasic       92                                         PFA  Triphasic       70                89               58      SFA  Triphasic                         54                       POP  Monophasic      30                                 16      AT  Monophasic      57                                 55      PT  Monophasic      104                                28      KAYKAY                LEFT  Waveform        Peak Systolic Velocity (cm/s)                  Prox    Prox-Mid  Mid    Mid-Dist  Distal  Triphasic                         68                       CFA   Triphasic       69                                         PFA  Triphasic       72                84               63      SFA  Biphasic                          92                       POP  Monophasic      68                                 53      AT  Monophasic      37                                 41      PT  Monophasic      118                                10      KAYKAY  FINDINGS  Right.  Mild plaque and calcifications throughout the extremity.  Triphasic waveforms and normal velocities seen from the common femoral to  the popliteal artery.  Possible ulceration of distal femoral artery vs. artifact.  Monophasic waveforms in the posterior tibial, anterior tibial and peroneal  arteries.  Left.  Mild plaque and calcifications throughout the extremity.  Triphasic waveforms with normal velocities seen from the common femoral to  the popliteal artery.  Monophasic waveforms found in the posterior tibial, anterior tibial, and  peroneal arteries.  Severely dampened waveform in the distal peroneal artery.  Shanda Tobias M.D.  (Electronically Signed)  Final Date:      22 August 2019                   20:37      Micro:  Results     Procedure Component Value Units Date/Time    CULTURE WOUND W/ GRAM STAIN [[id number]]  (Abnormal)  (Susceptibility) Collected:  08/22/19 0617    Order Status:  Completed Specimen:  Wound from Left Foot Updated:  08/24/19 0812     Significant Indicator POS     Source WND     Site LEFT FOOT     Culture Result -     Gram Stain Result Few WBCs.  Rare Gram positive cocci.       Culture Result Staphylococcus aureus  Moderate growth  This isolate is presumed to be clindamycin resistant based on  detection of inducible resistance.  Clindamycin may still  be effective in some patients.      Susceptibility     Staphylococcus aureus (1)     Antibiotic Interpretation Microscan Method Status    Clindamycin Resistant <=0.5 mcg/mL RAMIRO Preliminary    Daptomycin Sensitive <=0.5 mcg/mL RAMIRO Preliminary     "Erythromycin Resistant >4 mcg/mL RAMIRO Preliminary    Moxifloxacin Sensitive <=0.5 mcg/mL RAMIRO Preliminary    Ampicillin/sulbactam Sensitive <=8/4 mcg/mL RAMIRO Preliminary    Oxacillin Sensitive <=0.25 mcg/mL RAMIRO Preliminary    Vancomycin Sensitive 1 mcg/mL RAMIRO Preliminary    Penicillin Resistant >8 mcg/mL RAMIRO Preliminary    Trimeth/Sulfa Sensitive <=0.5/9.5 mcg/mL RAMIRO Preliminary    Tetracycline Sensitive <=4 mcg/mL RAMIRO Preliminary                   BLOOD CULTURE [[id number]] Collected:  08/22/19 1203    Order Status:  Completed Specimen:  Blood from Peripheral Updated:  08/23/19 0921     Significant Indicator NEG     Source BLD     Site PERIPHERAL     Culture Result No Growth  Note: Blood cultures are incubated for 5 days and  are monitored continuously.Positive blood cultures  are called to the RN and reported as soon as  they are identified.      Narrative:       Per Hospital Policy: Only change Specimen Src: to \"Line\" if  specified by physician order.  Left AC    BLOOD CULTURE [[id number]] Collected:  08/22/19 1203    Order Status:  Completed Specimen:  Blood from Peripheral Updated:  08/23/19 0921     Significant Indicator NEG     Source BLD     Site PERIPHERAL     Culture Result No Growth  Note: Blood cultures are incubated for 5 days and  are monitored continuously.Positive blood cultures  are called to the RN and reported as soon as  they are identified.      Narrative:       Per Hospital Policy: Only change Specimen Src: to \"Line\" if  specified by physician order.  Left Forearm/Arm    GRAM STAIN [[id number]] Collected:  08/22/19 0617    Order Status:  Completed Specimen:  Wound Updated:  08/22/19 1215     Significant Indicator .     Source WND     Site LEFT FOOT     Gram Stain Result Few WBCs.  Rare Gram positive cocci.            Assessment:  Active Hospital Problems    Diagnosis   • *Osteomyelitis of left foot (HCC) [M86.9]   • Peripheral arterial disease (HCC) [I73.9]   • Normocytic anemia [D64.9]   • Diabetic " foot ([facility]) [E11.8]   • Uncontrolled type 2 diabetes mellitus with hyperglycemia ([facility]) [E11.65]       Plan:  Left diabetic foot infection with underlying osteomyelitis  No fevers  No leukocytosis  Cultures + MSSA, ampicillin sensitive Enterococcus faecalis  Discontinue Zosyn and Zyvox  Transition to IV Unasyn  Patient refusing amputation at this time  PICC line ordered  Anticipate 6 weeks of IV antibiotics  Prognosis for limb salvage guarded  He may ultimately require TMA versus BKA in the future  Wound care    Peripheral vascular disease  Status post revascularization    Uncontrolled type II diabetes mellitus  Glycosylated hemoglobin 7.7  Diabetes education  Will adversely affect wound healing  Blood sugar 154 today    Disposition: Home versus R-OPIC.  He can do daptomycin 8 mg/kg daily.  He will need to hold his atorvastatin while on daptomycin.    Discussed with internal medicine [facility] resident Dr. Loving

## 2019-08-24 NOTE — PROGRESS NOTES
Dressing change completed with dakins solution, ABD, and kerlex. Pt tolerated intervention well and declined pain medication before and after. Will continue to monitor and follow up.

## 2019-08-24 NOTE — CARE PLAN
Problem: Safety  Goal: Will remain free from falls  Outcome: PROGRESSING AS EXPECTED  Note:   Calls for assistance when appropriate. Call light and personal belongings within reach. Bed in low and locked position. Fall education provided to patient.       Problem: Pain Management  Goal: Pain level will decrease to patient's comfort goal  Outcome: PROGRESSING AS EXPECTED  Flowsheets (Taken 8/24/2019 0721)  Non Verbal Scale : Calm; Sleeping; Unlabored Breathing  Note:   Pain scale explained to patient. Requests pain medications when appropriate. Medication administered as ordered per MAR.  Nonpharmacologic pain interventions in use.

## 2019-08-24 NOTE — PROGRESS NOTES
Long discussion with patient and family members regarding PICC line placement. Patient and wife are in agreement to move forward with PICC and IV antibiotic treatment.  Updated family that VAT team is not here until tomorrow so PICC would not be placed until then.  All family's questions answered at this time.

## 2019-08-24 NOTE — FACE TO FACE
Face to Face Supporting Documentation - Home Health    The encounter with this patient was in whole or in part the primary reason for home health admission.    Date of encounter:   Patient:                    MRN:                       YOB: 2019  Israel Aviles  [id number]  1965     Home health to see patient for:  Skilled Nursing care for assessment, interventions & education and Wound Care    Skilled need for:  Exacerbation of Chronic Disease State DM2, foot ulcer and Medication Management IV abx    Skilled nursing interventions to include:  Home IV infusion therapy    Homebound status evidenced by:  Need the aid of supportive devices such as crutches, canes, wheelchairs or walkers. Leaving home requires a considerable and taxing effort. There is a normal inability to leave the home.    Community Physician to provide follow up care: Mj Bai M.D.     Optional Interventions? No      I certify the face to face encounter for this home health care referral meets the CMS requirements and the encounter/clinical assessment with the patient was, in whole, or in part, for the medical condition(s) listed above, which is the primary reason for home health care. Based on my clinical findings: the service(s) are medically necessary, support the need for home health care, and the homebound criteria are met.  I certify that this patient has had a face to face encounter by myself.  Brock Loving D.O. - NPI: [id number]

## 2019-08-25 ENCOUNTER — APPOINTMENT (OUTPATIENT)
Dept: RADIOLOGY | Facility: MEDICAL CENTER | Age: 54
DRG: 638 | End: 2019-08-25
Attending: INTERNAL MEDICINE
Payer: COMMERCIAL

## 2019-08-25 LAB
BASOPHILS # BLD AUTO: 0.5 % (ref 0–1.8)
BASOPHILS # BLD: 0.03 K/UL (ref 0–0.12)
EOSINOPHIL # BLD AUTO: 0.08 K/UL (ref 0–0.51)
EOSINOPHIL NFR BLD: 1.3 % (ref 0–6.9)
ERYTHROCYTE [DISTWIDTH] IN BLOOD BY AUTOMATED COUNT: 45.7 FL (ref 35.9–50)
GLUCOSE BLD-MCNC: 101 MG/DL (ref 65–99)
GLUCOSE BLD-MCNC: 162 MG/DL (ref 65–99)
GLUCOSE BLD-MCNC: 257 MG/DL (ref 65–99)
GLUCOSE BLD-MCNC: 281 MG/DL (ref 65–99)
HCT VFR BLD AUTO: 35.1 % (ref 42–52)
HGB BLD-MCNC: 11.1 G/DL (ref 14–18)
IMM GRANULOCYTES # BLD AUTO: 0.01 K/UL (ref 0–0.11)
IMM GRANULOCYTES NFR BLD AUTO: 0.2 % (ref 0–0.9)
LYMPHOCYTES # BLD AUTO: 2.14 K/UL (ref 1–4.8)
LYMPHOCYTES NFR BLD: 35.5 % (ref 22–41)
MCH RBC QN AUTO: 28.9 PG (ref 27–33)
MCHC RBC AUTO-ENTMCNC: 31.6 G/DL (ref 33.7–35.3)
MCV RBC AUTO: 91.4 FL (ref 81.4–97.8)
MONOCYTES # BLD AUTO: 0.57 K/UL (ref 0–0.85)
MONOCYTES NFR BLD AUTO: 9.5 % (ref 0–13.4)
NEUTROPHILS # BLD AUTO: 3.2 K/UL (ref 1.82–7.42)
NEUTROPHILS NFR BLD: 53 % (ref 44–72)
NRBC # BLD AUTO: 0 K/UL
NRBC BLD-RTO: 0 /100 WBC
PLATELET # BLD AUTO: 237 K/UL (ref 164–446)
PMV BLD AUTO: 10.1 FL (ref 9–12.9)
RBC # BLD AUTO: 3.84 M/UL (ref 4.7–6.1)
WBC # BLD AUTO: 6 K/UL (ref 4.8–10.8)

## 2019-08-25 PROCEDURE — 700111 HCHG RX REV CODE 636 W/ 250 OVERRIDE (IP): Performed by: INTERNAL MEDICINE

## 2019-08-25 PROCEDURE — A9270 NON-COVERED ITEM OR SERVICE: HCPCS | Performed by: HOSPITALIST

## 2019-08-25 PROCEDURE — 86341 ISLET CELL ANTIBODY: CPT

## 2019-08-25 PROCEDURE — 700111 HCHG RX REV CODE 636 W/ 250 OVERRIDE (IP): Performed by: FAMILY MEDICINE

## 2019-08-25 PROCEDURE — 84681 ASSAY OF C-PEPTIDE: CPT

## 2019-08-25 PROCEDURE — 700102 HCHG RX REV CODE 250 W/ 637 OVERRIDE(OP): Performed by: HOSPITALIST

## 2019-08-25 PROCEDURE — 99232 SBSQ HOSP IP/OBS MODERATE 35: CPT | Performed by: INTERNAL MEDICINE

## 2019-08-25 PROCEDURE — 700102 HCHG RX REV CODE 250 W/ 637 OVERRIDE(OP): Performed by: STUDENT IN AN ORGANIZED HEALTH CARE EDUCATION/TRAINING PROGRAM

## 2019-08-25 PROCEDURE — 85025 COMPLETE CBC W/AUTO DIFF WBC: CPT

## 2019-08-25 PROCEDURE — 770001 HCHG ROOM/CARE - MED/SURG/GYN PRIV*

## 2019-08-25 PROCEDURE — 700105 HCHG RX REV CODE 258: Performed by: INTERNAL MEDICINE

## 2019-08-25 PROCEDURE — 36415 COLL VENOUS BLD VENIPUNCTURE: CPT

## 2019-08-25 PROCEDURE — 82962 GLUCOSE BLOOD TEST: CPT | Mod: 91

## 2019-08-25 PROCEDURE — A9270 NON-COVERED ITEM OR SERVICE: HCPCS | Performed by: FAMILY MEDICINE

## 2019-08-25 PROCEDURE — 700102 HCHG RX REV CODE 250 W/ 637 OVERRIDE(OP): Performed by: FAMILY MEDICINE

## 2019-08-25 RX ORDER — INSULIN GLARGINE 100 [IU]/ML
10 INJECTION, SOLUTION SUBCUTANEOUS EVERY EVENING
Status: DISCONTINUED | OUTPATIENT
Start: 2019-08-25 | End: 2019-08-27 | Stop reason: HOSPADM

## 2019-08-25 RX ORDER — INSULIN GLARGINE 100 [IU]/ML
10 INJECTION, SOLUTION SUBCUTANEOUS
Status: DISCONTINUED | OUTPATIENT
Start: 2019-08-25 | End: 2019-08-27 | Stop reason: HOSPADM

## 2019-08-25 RX ORDER — SODIUM CHLORIDE 9 MG/ML
INJECTION, SOLUTION INTRAVENOUS
Status: ACTIVE
Start: 2019-08-25 | End: 2019-08-25

## 2019-08-25 RX ADMIN — SODIUM HYPOCHLORITE 5 ML: 1.25 SOLUTION TOPICAL at 04:47

## 2019-08-25 RX ADMIN — AMPICILLIN AND SULBACTAM 3 G: 2; 1 INJECTION, POWDER, FOR SOLUTION INTRAVENOUS at 05:08

## 2019-08-25 RX ADMIN — ASPIRIN 81 MG: 81 TABLET, COATED ORAL at 04:46

## 2019-08-25 RX ADMIN — INSULIN HUMAN 2 UNITS: 100 INJECTION, SOLUTION PARENTERAL at 06:02

## 2019-08-25 RX ADMIN — AMPICILLIN AND SULBACTAM 3 G: 2; 1 INJECTION, POWDER, FOR SOLUTION INTRAVENOUS at 23:06

## 2019-08-25 RX ADMIN — INSULIN HUMAN 5 UNITS: 100 INJECTION, SOLUTION PARENTERAL at 16:58

## 2019-08-25 RX ADMIN — INSULIN HUMAN 5 UNITS: 100 INJECTION, SOLUTION PARENTERAL at 20:40

## 2019-08-25 RX ADMIN — SODIUM HYPOCHLORITE 1 ML: 1.25 SOLUTION TOPICAL at 17:06

## 2019-08-25 RX ADMIN — ENOXAPARIN SODIUM 40 MG: 100 INJECTION SUBCUTANEOUS at 04:46

## 2019-08-25 RX ADMIN — AMPICILLIN AND SULBACTAM 3 G: 2; 1 INJECTION, POWDER, FOR SOLUTION INTRAVENOUS at 11:18

## 2019-08-25 RX ADMIN — ATORVASTATIN CALCIUM 80 MG: 20 TABLET, FILM COATED ORAL at 20:38

## 2019-08-25 RX ADMIN — AMPICILLIN AND SULBACTAM 3 G: 2; 1 INJECTION, POWDER, FOR SOLUTION INTRAVENOUS at 16:55

## 2019-08-25 RX ADMIN — INSULIN GLARGINE 10 UNITS: 100 INJECTION, SOLUTION SUBCUTANEOUS at 17:00

## 2019-08-25 ASSESSMENT — ENCOUNTER SYMPTOMS
DIZZINESS: 0
VOMITING: 0
COUGH: 0
NAUSEA: 0
SPEECH CHANGE: 0
MYALGIAS: 1
SHORTNESS OF BREATH: 0
SENSORY CHANGE: 0
CHILLS: 0
FEVER: 0
HEADACHES: 0

## 2019-08-25 NOTE — PROGRESS NOTES
[facility] PROGRESS NOTE     Attending:   Maggie Allan MD    Resident:   Brock Loving DO    PATIENT:   Israel Aviles; [id number]; 1965    ID:   53 y.o. Male with a history of type 2 diabetes, hypertension, CVA admitted for left foot ulcer and osteomyelitis of the left foot.    Patient had a foot ulcer on the left dorsal foot which began in March 2019 which has progressively gotten worse.  Initially it was cared for by the wound clinic.  The patient was on a course of antibiotics; however, it is unclear if the patient finished this course of antibiotics.  The patient was seen in in June 2019 and had several vascular procedures performed by Dr. Coates of vascular surgery.  These procedures included a dorsalis pedis angioplasty, anterior tibial artery angioplasty, and posterior tibial artery angioplasty.    The patient was sent to the ER several days ago by wound care as they believe that the wound is not improving.  MRI on 8/21 showed first MPJ with septic arthropathy, osteomyelitis of the first TMT joint.  Wound cultures of the left foot were positive for MSSA and Enterobacter.    Infectious disease and vascular surgery have been following the case in both strongly recommend a BKA.  Family and patient are very reluctant to proceed with an amputation and requests to be placed on long-term antibiotics.  Patient is currently on Unasyn.  Plan is to continue this treatment outpatient for 6 weeks, per ID recommendations.    SUBJECTIVE:   No acute events overnight, patient is doing well. His left foot pain is about at his baseline. He denies any other symptoms at this time.     OBJECTIVE:  Vitals:    08/24/19 0815 08/24/19 1619 08/24/19 2100 08/25/19 0500   BP: 129/78 124/74 137/86 128/80   Pulse: 85 88 89 86   Resp: 17 18 16 18   Temp: 36.3 °C (97.3 °F) 36.3 °C (97.4 °F) 36.7 °C (98.1 °F) 36.7 °C (98 °F)   TempSrc: Temporal Temporal Temporal Temporal   SpO2: 96% 99% 100% 97%   Weight:       Height:            Intake/Output Summary (Last 24 hours) at 8/25/2019 0646  Last data filed at 8/24/2019 1920  Gross per 24 hour   Intake 560 ml   Output --   Net 560 ml       PHYSICAL EXAM:  General: No acute distress, afebrile, resting comfortably  HEENT: NC/AT. EOMI.   Cardiovascular: RRR without murmurs. Normal capillary refill   Respiratory: CTAB, no tachypnea or retractions  Abdomen: soft, nontender, nondistended, no masses  EXT:  SILVA, no edema. Left foot wrapped in gauze.  Skin: No erythema/lesions   Neuro: Non-focal    LABS:  Recent Labs     08/23/19  0757 08/24/19  0421 08/25/19  0337   WBC 6.7 5.8 6.0   RBC 3.95* 4.02* 3.84*   HEMOGLOBIN 11.0* 11.1* 11.1*   HEMATOCRIT 35.5* 36.5* 35.1*   MCV 89.9 90.8 91.4   MCH 27.8 27.6 28.9   RDW 45.1 45.8 45.7   PLATELETCT 235 236 237   MPV 9.9 9.8 10.1   NEUTSPOLYS 58.20 55.20 53.00   LYMPHOCYTES 30.10 33.80 35.50   MONOCYTES 9.20 8.40 9.50   EOSINOPHILS 1.60 1.90 1.30   BASOPHILS 0.60 0.50 0.50     Recent Labs     08/23/19  0757   SODIUM 135   POTASSIUM 3.8   CHLORIDE 103   CO2 27   BUN 10   CREATININE 0.74   CALCIUM 9.0     Estimated GFR/CRCL = Estimated Creatinine Clearance: 122.5 mL/min (by C-G formula based on SCr of 0.74 mg/dL).  Recent Labs     08/23/19  0757  08/24/19  1116 08/24/19  2117 08/25/19  0604   GLUCOSE 184*  --   --   --   --    POCGLUCOSE  --    < > 154* 343* 162*    < > = values in this interval not displayed.                 No results for input(s): INR, APTT, FIBRINOGEN in the last 72 hours.    Invalid input(s): DIMER      IMAGING:  US-EXTREMITY ARTERY LOWER BILAT   Final Result      US-ALYSSA SINGLE LEVEL BILAT   Final Result      MR-FOOT-WITH & W/O LEFT   Final Result      1.  First metatarsal-phalangeal joint septic arthropathy      2.  Osteomyelitis affecting the entire first metatarsal, proximal phalanx and sesamoids. Edema and enhancement in the adjacent medial cuneiform could be reactive or secondary to early septic arthropathy and osteomyelitis centered  at the first TMT joint      3.  Suspicious for early second medial metatarsal head osteomyelitis. Septic arthropathy cannot be excluded      4.  Medial first metatarsal-phalangeal centered greater than dorsal medial forefoot skin ulceration with cellulitis and phlegmonous change. No abscess identified      5.  Second metatarsal shaft edema is more likely from stress response than infection      6.  Plantar fasciitis      DX-FOOT-COMPLETE 3+ LEFT   Final Result      1.  Soft tissue swelling and defect about the great toe MTP joint.   2.  Abnormal lucency is again noted about the first MTP joint suggestive of osteomyelitis/infection. Further evaluation with MRI without and with contrast is suggested.      US-EXTREMITY ARTERY LOWER BILAT    (Results Pending)   IR-PICC LINE PLACEMENT W/ GUIDANCE > AGE 5    (Results Pending)       MEDS:  Current Facility-Administered Medications   Medication Last Dose   • insulin glargine (LANTUS) injection 15 Units 15 Units at 08/24/19 1802   • ampicillin/sulbactam (UNASYN) 3 g in  mL IVPB Stopped at 08/25/19 0538   • atorvastatin (LIPITOR) tablet 80 mg 80 mg at 08/24/19 2118   • dakins 0.125% (1/4 strength) topical soln 5 mL at 08/25/19 0447   • senna-docusate (PERICOLACE or SENOKOT S) 8.6-50 MG per tablet 2 Tab 2 Tab at 08/23/19 1727    And   • polyethylene glycol/lytes (MIRALAX) PACKET 1 Packet      And   • magnesium hydroxide (MILK OF MAGNESIA) suspension 30 mL      And   • bisacodyl (DULCOLAX) suppository 10 mg     • enoxaparin (LOVENOX) inj 40 mg 40 mg at 08/25/19 0446   • enalaprilat (VASOTEC) injection 1.25 mg     • ondansetron (ZOFRAN) syringe/vial injection 4 mg     • ondansetron (ZOFRAN ODT) dispertab 4 mg     • promethazine (PHENERGAN) tablet 12.5-25 mg     • promethazine (PHENERGAN) suppository 12.5-25 mg     • prochlorperazine (COMPAZINE) injection 5-10 mg     • acetaminophen (TYLENOL) tablet 650 mg     • insulin regular (HUMULIN R) injection 2-9 Units 2 Units at  08/25/19 0602    And   • glucose 4 g chewable tablet 16 g      And   • DEXTROSE 10% BOLUS 250 mL     • oxyCODONE immediate-release (ROXICODONE) tablet 5 mg     • aspirin EC (ECOTRIN) tablet 81 mg 81 mg at 08/25/19 0446       ASSESSMENT/PLAN:    #Left foot wound  #Osteomyelitis, left foot  -Wound present since March 2019  -MRI of the foot shows first metatarsal phalangeal joint with septic arthropathy              -Early second medial metatarsal head osteomyelitis              -Osteomyelitis affecting the entire first metatarsal proximal phalanx and sesamoids              -Osteomyelitis entering the first TMT joint  -ID and vascular surgery are following the case  -Wound culture grew Enterobacter and MSSA   -Antibiotics recently switched to Unasyn IV  -Recommendation at this time is that patient have a below-knee amputation as the osteomyelitis is unlikely to resolve with a course of antibiotics  -The patient and his family very reluctant to proceed with an amputation.  The potential complications of not proceeding with an amputation were discussed at length with the patient yesterday and the day before.  The patient understands that do not proceed with the amputation may lead to spreading of the osteomyelitis and potentially lead to sepsis.     Plan: The patient would not like to proceed with an amputation at this time and understands the possible risks.  The case was discussed with ID.  Recommendation is 6 weeks of IV Unasyn outpatient.  Plan to contact the PICC team today to have a PICC line placed and potential discharge today or tomorrow.    #Hx of PAD  #Hx of CVA, 6/2018  #Hyperlipidemia  -Patient is on aspirin and Plavix outpatient  -Vascular procedures performed in June 2019 include   -Dorsalis pedis angioplasty   -Anterior tibial artery angioplasty and arthrectomy   -Posterior tibial artery angioplasty  -Dr. Coates of vascular surgery is following the case and recommends a BKA  -LDL in June 2018 was elevated  at 182  -Patient takes Lipitor 40 mg at home, have increased his Lipitor dose to 80 mg daily    #DM2  -Patient's diabetes appears to be rather uncontrolled  -Glucose is variable from 162 - 343 in the last 24 hours  -Patient on glipizide at home  -Hemoglobin A1c of 7.7  -HA1c been as high as 12.3 in June 2018, review of [facility] records indicate patient has DM2 with detectable c-peptide   -Patient on Lantus and Humulin R for glucose control    #Anemia, normocytic   -Hemoglobin stable at 11.1 yesterday and today  -Review of past labs indicate patient has been slightly anemic for the last several months  -Continue to monitor      Lines: PIV  Abx: Unasyn  DVT prophylaxis: Lovenox  CODE Status: Full    Brock Loving,   PGY-1  [facility]

## 2019-08-25 NOTE — PROGRESS NOTES
8/25/19 at 4:27 PM      Wound care called and said they would recommend a Veraflo for the patient's foot wound, which would require he be placed in a SNF. I discussed options with the patient and he is not interested in placement in a SNF facility at this time. He requests to be discharged home with a PICC line for his IV abx and would not like to accept the Veraflo.     Appreciate wound care recommendations.     Brock Loving, DO  PGY-1  [facility]

## 2019-08-25 NOTE — PROGRESS NOTES
Bedside report received.  Assessment complete.  A&O x 4. Patient calls appropriately.  Patient up with no assist. Bed alarm NI.   Patient has 2/10 pain. Declines pain intervention  Denies N&V. Tolerating diagetic, vegetarian, no egg diet.  + void, + flatus, 8/25 BM.  Patient denies SOB.  Patient dressing changed early this am to L foot.  Review plan with of care with patient. Call light and personal belongings with in reach. Hourly rounding in place. All needs met at this time.

## 2019-08-25 NOTE — CARE PLAN
Problem: Pain Management  Goal: Pain level will decrease to patient's comfort goal  Outcome: PROGRESSING AS EXPECTED     Problem: Knowledge Deficit  Goal: Knowledge of disease process/condition, treatment plan, diagnostic tests, and medications will improve  Outcome: PROGRESSING SLOWER THAN EXPECTED  Reinforcement and reeducation needed of plan of care and options.

## 2019-08-25 NOTE — PROGRESS NOTES
Patient refused SCDs, educated regarding importance.  Education reinforced by KEENA Barroso.  Patient continues to refuse.

## 2019-08-25 NOTE — PROGRESS NOTES
Infectious Disease Progress Note    Author: Jolie Victoria M.D. Date & Time of service: 2019  10:41 AM    Chief Complaint:  Follow-up for left diabetic foot infection/osteomyelitis    Interval History:  2019 no fevers.  W BC 6.7 foot wound is showing staph aureus   afebrile, WBC 5.8 ongoing left foot pain.  Patient continues to refuse amputation and wants to continue aggressive antibiotic therapy.  Wound culture+ MSSA.  Plan of care with PICC line and 6 weeks of IV antibiotics discussed with patient and family at bedside   afebrile WBC 6 no new clinical issues overnight.  Plan for PICC line placement today.  Patient would like to do home IV antibiotics.    Labs Reviewed.    Review of Systems:  Review of Systems   Constitutional: Negative for chills, fever and malaise/fatigue.   HENT: Negative for hearing loss.    Respiratory: Negative for cough and shortness of breath.    Cardiovascular: Negative for chest pain and leg swelling.   Gastrointestinal: Negative for nausea and vomiting.   Genitourinary: Negative for dysuria.   Musculoskeletal: Positive for myalgias.   Neurological: Negative for dizziness, sensory change, speech change and headaches.       Hemodynamics:  Temp (24hrs), Av.6 °C (97.9 °F), Min:36.3 °C (97.4 °F), Max:36.7 °C (98.1 °F)  Temperature: 36.7 °C (98.1 °F)  Pulse  Av.3  Min: 83  Max: 108   Blood Pressure: 132/85       Physical Exam:  Physical Exam   Constitutional: He is oriented to person, place, and time. No distress.   HENT:   Mouth/Throat: No oropharyngeal exudate.   Eyes: Pupils are equal, round, and reactive to light. No scleral icterus.   Neck: Neck supple.   Cardiovascular: Regular rhythm.   No murmur heard.  Pulmonary/Chest: He has no wheezes. He has no rales.   Abdominal: Soft. There is no tenderness. There is no rebound and no guarding.   Musculoskeletal: He exhibits no edema or tenderness.   Left foot dressed  Wound on the medial surface with bone visible    Lymphadenopathy:     He has no cervical adenopathy.   Neurological: He is alert and oriented to person, place, and time.   No focal neurological deficit   Skin: Skin is warm. No erythema.   Vitals reviewed.      Meds:    Current Facility-Administered Medications:   •  insulin glargine  •  insulin glargine  •  ampicillin-sulbactam (UNASYN) IV  •  atorvastatin  •  dakins 0.125% (1/4 strength)  •  senna-docusate **AND** polyethylene glycol/lytes **AND** magnesium hydroxide **AND** bisacodyl  •  enoxaparin  •  enalaprilat  •  ondansetron  •  ondansetron  •  promethazine  •  promethazine  •  prochlorperazine  •  acetaminophen  •  insulin regular **AND** Accu-Chek ACHS **AND** NOTIFY MD and PharmD **AND** glucose **AND** dextrose 10% bolus  •  oxyCODONE immediate-release  •  aspirin    Labs:  Recent Labs     08/23/19  0757 08/24/19  0421 08/25/19  0337   WBC 6.7 5.8 6.0   RBC 3.95* 4.02* 3.84*   HEMOGLOBIN 11.0* 11.1* 11.1*   HEMATOCRIT 35.5* 36.5* 35.1*   MCV 89.9 90.8 91.4   MCH 27.8 27.6 28.9   RDW 45.1 45.8 45.7   PLATELETCT 235 236 237   MPV 9.9 9.8 10.1   NEUTSPOLYS 58.20 55.20 53.00   LYMPHOCYTES 30.10 33.80 35.50   MONOCYTES 9.20 8.40 9.50   EOSINOPHILS 1.60 1.90 1.30   BASOPHILS 0.60 0.50 0.50     Recent Labs     08/23/19  0757   SODIUM 135   POTASSIUM 3.8   CHLORIDE 103   CO2 27   GLUCOSE 184*   BUN 10     Recent Labs     08/23/19  0757   CREATININE 0.74       Imaging:  Dx-foot-complete 3+ Left    Result Date: 8/21/2019 8/21/2019 11:55 AM HISTORY/REASON FOR EXAM:  Atraumatic Pain/Swelling/Deformity; ? osteomyelitis Swelling, ulcer, diabetes TECHNIQUE/EXAM DESCRIPTION AND NUMBER OF VIEWS: 3 views of the LEFT foot. COMPARISON:  8/16/2019 FINDINGS: Redemonstrated is soft tissue defect along the medial aspect of the foot at the first MTP joint. There is abnormal lucency involving the first metatarsal head and the proximal phalangeal base which is suspicious for osteomyelitis and possibly septic arthritis. There  is significant joint space narrowing of the MTP joint. No acute fracture or dislocation is seen. Alignment is anatomic. Small Achilles and plantar calcaneal spurs. There are atherosclerotic arterial calcifications.     1.  Soft tissue swelling and defect about the great toe MTP joint. 2.  Abnormal lucency is again noted about the first MTP joint suggestive of osteomyelitis/infection. Further evaluation with MRI without and with contrast is suggested.    Dx-foot-complete 3+ Left    Result Date: 8/16/2019 8/16/2019 4:11 PM HISTORY/REASON FOR EXAM:  Rule out osteomyelitis TECHNIQUE/EXAM DESCRIPTION AND NUMBER OF VIEWS: 3 views of the LEFT foot. COMPARISON:  5/17/2019. FINDINGS:  There is indistinctness of the cortical surfaces first metatarsal-phalangeal joint which are suggestive of osteomyelitis. Bony alignment is anatomic. There is a large soft tissue defect over the dorsal and medial surface of the first metatarsophalangeal joint. No acute fracture or dislocation is identified.     1.  Indistinctness of the cortical surfaces first metatarsal-phalangeal joint which are suggestive of osteomyelitis. Consider MRI with and without contrast for further evaluation. 2.  Large soft tissue defect of the dorsal and medial surface of the first metatarsophalangeal joint.    Mr-foot-with & W/o Left    Result Date: 8/22/2019 8/21/2019 9:44 PM HISTORY/REASON FOR EXAM:  Diabetes with soft tissue infection TECHNIQUE/EXAM DESCRIPTION: MRI of the LEFT foot with and without contrast. Using a [employer] 1.5 Luisa MRI scanner, T1 axial, sagittal, and coronal, fast spin-echo T2 fat-suppressed axial and coronal, fast inversion recovery sagittal, and T1 fat suppressed axial and sagittal post intravenous contrast images were obtained. A total of 15 mL ProHance given. COMPARISON:  Radiographs 8/21/2019 FINDINGS: The distal aspect of the phalanges are not well seen due to some artifact, inhomogeneous fat saturation First  metatarsal-phalangeal centered increased T2, decreased T1 medullary space signals identified. The entire first metatarsus is affected as is the proximal phalanx. There is an metatarsal-phalangeal effusion. There is also slight edema and enhancement  in the medial cuneiform. No effusion. The first metatarsal head sesamoids are edematous and there is bony destruction posteriorly in the lateral sesamoid. There is also bony destruction involving the lateral aspect of the first metatarsal-phalangeal joint. Adjacent skin ulceration is seen medial to the metatarsal-phalangeal joint. No soft tissue abscess is detected. There is a moderate forefoot skin thickening and soft tissue enhancement indicating cellulitis and phlegmonous change. No other bony destruction is seen but there is some edema in the adjacent second metatarsal head. This enhances coronal image 34. There is slight associated decreased T1 signal precontrast. No joint effusion is confirmed. No ligament or tendon tear is confirmed. No retraction noted. There is plantar fat pad decreased T1, mildly increased T2 and enhanced T1 signal overlying the calcaneus suggesting post contusion related change. Plantar fascia is moderately thickened and there is a calcaneus spur.     1.  First metatarsal-phalangeal joint septic arthropathy 2.  Osteomyelitis affecting the entire first metatarsal, proximal phalanx and sesamoids. Edema and enhancement in the adjacent medial cuneiform could be reactive or secondary to early septic arthropathy and osteomyelitis centered at the first TMT joint 3.  Suspicious for early second medial metatarsal head osteomyelitis. Septic arthropathy cannot be excluded 4.  Medial first metatarsal-phalangeal centered greater than dorsal medial forefoot skin ulceration with cellulitis and phlegmonous change. No abscess identified 5.  Second metatarsal shaft edema is more likely from stress response than infection 6.  Plantar fasciitis    Us-simone Single  Level Bilat    Result Date: 2019   Vascular Laboratory  Conclusions  Monophasic  waveforms with low amplitude, right greater than left.  Decreased ALYSSA on the right. Findings are suggestive of significant arterial  disease  TRELL CASTLE  Age:    53    Gender:     M  MRN:    [id number]  :    1965      BSA:  Exam Date:     2019 16:01  Room #:     Inpatient  Priority:     Routine  Ht (in):             Wt (lb):  Ordering Physician:        JENNIFER CLEANING  Referring Physician:  Sonographer:               Nettie Guzman RDCS, LILLIET  Study Type:                Complete Bilateral  Technical Quality:         Adequate  Indications:     Ulceration of LE  CPT Codes:       65744  ICD Codes:       707.1  History:         Ulcerations  Limitations:                 RIGHT  Waveform            Systolic BPs (mmHg)                             137           Brachial                                           Common Femoral  Monophasic                 97            Posterior Tibial  Monophasic                 101           Dorsalis Pedis                                           Peroneal                             0.74          ALYSSA                                           TBI                       LEFT  Waveform        Systolic BPs (mmHg)                             132           Brachial                                           Common Femoral  Monophasic                 152           Posterior Tibial  Monophasic                 168           Dorsalis Pedis                                           Peroneal                             1.23          ALYSSA                                           TBI  Findings  Right.  Doppler waveforms at the ankle are monophasic with low  amplitude.  Ankle-brachial index is moderately reduced.  Left.  Doppler waveforms at the ankle are monophasic with  low amplitude.  Ankle-brachial index is falsly normal.  Mitzi Diggs  (Electronically Signed)   Final Date:      2019                   16:44    Us-extremity Artery Lower Bilat    Result Date: 2019  Lower Extremity  Arterial Duplex Report  Vascular Laboratory  CONCLUSIONS  Diffuse, laminar lower extremity arterial plaquing and calcium noted.  Bilaterally dampened distal waveforms suggesive of distal aterial disease  as detailed.  No noted focal arterial stenosis.  Compared with the prior study of May - it appears revascularization has  occurred in the interim.  TRELL CASTLE  Exam Date:     2019 17:58  Room #:     Inpatient  Priority:     Routine  Ht (in):             Wt (lb):  Ordering Physician:        JENNIFER CLEANING  Referring Physician:  Sonographer:               Chas oJ RVT, RDMS  Study Type:                Complete Bilateral  Technical Quality:         Adequate  Age:    53    Gender:     M  MRN:    [id number]  :    1965      BSA:  Indications:     PVD  CPT Codes:       55968  ICD Codes:       443.9  History:         Prior duplex on 19. Non-healing ulcerations on both                   feet.  Limitations:     Dropout due to calcification of vessels.                RIGHT  Waveform        Peak Systolic Velocity (cm/s)                  Prox    Prox-Mid  Mid    Mid-Dist  Distal  Triphasic                         91                       CFA  Triphasic       92                                         PFA  Triphasic       70                89               58      SFA  Triphasic                         54                       POP  Monophasic      30                                 16      AT  Monophasic      57                                 55      PT  Monophasic      104                                28      KAYKAY                LEFT  Waveform        Peak Systolic Velocity (cm/s)                  Prox    Prox-Mid  Mid    Mid-Dist  Distal  Triphasic                         68                       CFA  Triphasic       69                                          PFA  Triphasic       72                84               63      SFA  Biphasic                          92                       POP  Monophasic      68                                 53      AT  Monophasic      37                                 41      PT  Monophasic      118                                10      KAYKAY  FINDINGS  Right.  Mild plaque and calcifications throughout the extremity.  Triphasic waveforms and normal velocities seen from the common femoral to  the popliteal artery.  Possible ulceration of distal femoral artery vs. artifact.  Monophasic waveforms in the posterior tibial, anterior tibial and peroneal  arteries.  Left.  Mild plaque and calcifications throughout the extremity.  Triphasic waveforms with normal velocities seen from the common femoral to  the popliteal artery.  Monophasic waveforms found in the posterior tibial, anterior tibial, and  peroneal arteries.  Severely dampened waveform in the distal peroneal artery.  Shanda Tobias M.D.  (Electronically Signed)  Final Date:      22 August 2019                   20:37      Micro:  Results     Procedure Component Value Units Date/Time    CULTURE WOUND W/ GRAM STAIN [[id number]]  (Abnormal)  (Susceptibility) Collected:  08/22/19 0617    Order Status:  Completed Specimen:  Wound from Left Foot Updated:  08/24/19 1350     Significant Indicator POS     Source WND     Site LEFT FOOT     Culture Result Light growth mixed skin ester.     Gram Stain Result Few WBCs.  Rare Gram positive cocci.       Culture Result Staphylococcus aureus  Moderate growth  This isolate is presumed to be clindamycin resistant based on  detection of inducible resistance.  Clindamycin may still  be effective in some patients.        Enterococcus faecalis  Rare growth  Combination therapy with ampicillin, penicillin, or  vancomycin (for susceptible strains) plus an aminoglycoside  is usually indicated for serious enterococcal infections,  such  "as endocarditis unless high-level resistance to both  gentamicin and streptomycin is documented; such combinations  are predicted to result in synergistic killing of the  Enterococcus.      Susceptibility     Staphylococcus aureus (1)     Antibiotic Interpretation Microscan Method Status    Clindamycin Resistant <=0.5 mcg/mL RAMIRO Final    Daptomycin Sensitive <=0.5 mcg/mL RAMIRO Final    Erythromycin Resistant >4 mcg/mL RAMIRO Final    Moxifloxacin Sensitive <=0.5 mcg/mL RAMIRO Final    Oxacillin Sensitive <=0.25 mcg/mL RAMIRO Final    Ampicillin/sulbactam Sensitive <=8/4 mcg/mL RAMIRO Final    Penicillin Resistant >8 mcg/mL RAMIRO Final    Trimeth/Sulfa Sensitive <=0.5/9.5 mcg/mL RAMIRO Final    Tetracycline Sensitive <=4 mcg/mL RAMIRO Final    Vancomycin Sensitive 1 mcg/mL RAMIRO Final          Enterococcus faecalis (2)     Antibiotic Interpretation Microscan Method Status    Daptomycin Sensitive 2 mcg/mL RAMIRO Final    Penicillin Sensitive 2 mcg/mL RAMIRO Final    Gent Synergy Sensitive <=500 mcg/mL RAMIRO Final    Vancomycin Sensitive 2 mcg/mL RAMIRO Final    Ampicillin Sensitive <=2 mcg/mL RAMIRO Final                   BLOOD CULTURE [[id number]] Collected:  08/22/19 1203    Order Status:  Completed Specimen:  Blood from Peripheral Updated:  08/23/19 0921     Significant Indicator NEG     Source BLD     Site PERIPHERAL     Culture Result No Growth  Note: Blood cultures are incubated for 5 days and  are monitored continuously.Positive blood cultures  are called to the RN and reported as soon as  they are identified.      Narrative:       Per Hospital Policy: Only change Specimen Src: to \"Line\" if  specified by physician order.  Left AC    BLOOD CULTURE [[id number]] Collected:  08/22/19 1203    Order Status:  Completed Specimen:  Blood from Peripheral Updated:  08/23/19 0921     Significant Indicator NEG     Source BLD     Site PERIPHERAL     Culture Result No Growth  Note: Blood cultures are incubated for 5 days and  are monitored continuously.Positive " "blood cultures  are called to the RN and reported as soon as  they are identified.      Narrative:       Per Hospital Policy: Only change Specimen Src: to \"Line\" if  specified by physician order.  Left Forearm/Arm    GRAM STAIN [[id number]] Collected:  08/22/19 0617    Order Status:  Completed Specimen:  Wound Updated:  08/22/19 1215     Significant Indicator .     Source WND     Site LEFT FOOT     Gram Stain Result Few WBCs.  Rare Gram positive cocci.            Assessment:  Active Hospital Problems    Diagnosis   • *Osteomyelitis of left foot ([facility]) [M86.9]   • Peripheral arterial disease ([facility]) [I73.9]   • Normocytic anemia [D64.9]   • Diabetic foot ([facility]) [E11.8]   • Uncontrolled type 2 diabetes mellitus with hyperglycemia ([facility]) [E11.65]       Plan:  Left diabetic foot infection with underlying osteomyelitis  No fevers  No leukocytosis  Cultures + MSSA, ampicillin sensitive Enterococcus faecalis  Discontinued Zosyn and Zyvox  Continue IV Unasyn  Patient refusing amputation at this time  PICC line ordered  Anticipate 6 weeks of IV antibiotics total  Prognosis for limb salvage guarded  He may ultimately require TMA versus BKA in the future  Wound care  Patient would like to do home IV antibiotics.  Can do IV daptomycin 8 mg/kg daily    Peripheral vascular disease  Status post revascularization    Uncontrolled type II diabetes mellitus  Glycosylated hemoglobin 7.7  Diabetes education  Will adversely affect wound healing  Blood sugar 154 today    Disposition: Home versus R-OPIC.  He can do daptomycin 8 mg/kg daily.  He will need to hold his atorvastatin while on daptomycin.    I have performed a physical exam and reviewed and updated ROS and plan today 8/25/2019.  In review of yesterday's note 8/24/2019, there are no changes except as documented above.    Discussed with internal medicine [facility] resident Dr. Loving  "

## 2019-08-26 ENCOUNTER — APPOINTMENT (OUTPATIENT)
Dept: RADIOLOGY | Facility: MEDICAL CENTER | Age: 54
DRG: 638 | End: 2019-08-26
Attending: INTERNAL MEDICINE
Payer: COMMERCIAL

## 2019-08-26 LAB
BASOPHILS # BLD AUTO: 0.5 % (ref 0–1.8)
BASOPHILS # BLD: 0.03 K/UL (ref 0–0.12)
CK SERPL-CCNC: 28 U/L (ref 0–154)
EOSINOPHIL # BLD AUTO: 0.1 K/UL (ref 0–0.51)
EOSINOPHIL NFR BLD: 1.8 % (ref 0–6.9)
ERYTHROCYTE [DISTWIDTH] IN BLOOD BY AUTOMATED COUNT: 45.8 FL (ref 35.9–50)
GLUCOSE BLD-MCNC: 196 MG/DL (ref 65–99)
GLUCOSE BLD-MCNC: 259 MG/DL (ref 65–99)
GLUCOSE BLD-MCNC: 261 MG/DL (ref 65–99)
GLUCOSE BLD-MCNC: 274 MG/DL (ref 65–99)
GLUCOSE BLD-MCNC: 306 MG/DL (ref 65–99)
HCT VFR BLD AUTO: 36.2 % (ref 42–52)
HGB BLD-MCNC: 11.3 G/DL (ref 14–18)
IMM GRANULOCYTES # BLD AUTO: 0.01 K/UL (ref 0–0.11)
IMM GRANULOCYTES NFR BLD AUTO: 0.2 % (ref 0–0.9)
LYMPHOCYTES # BLD AUTO: 2.08 K/UL (ref 1–4.8)
LYMPHOCYTES NFR BLD: 36.7 % (ref 22–41)
MCH RBC QN AUTO: 28.6 PG (ref 27–33)
MCHC RBC AUTO-ENTMCNC: 31.2 G/DL (ref 33.7–35.3)
MCV RBC AUTO: 91.6 FL (ref 81.4–97.8)
MONOCYTES # BLD AUTO: 0.56 K/UL (ref 0–0.85)
MONOCYTES NFR BLD AUTO: 9.9 % (ref 0–13.4)
NEUTROPHILS # BLD AUTO: 2.89 K/UL (ref 1.82–7.42)
NEUTROPHILS NFR BLD: 50.9 % (ref 44–72)
NRBC # BLD AUTO: 0 K/UL
NRBC BLD-RTO: 0 /100 WBC
PLATELET # BLD AUTO: 229 K/UL (ref 164–446)
PMV BLD AUTO: 10.1 FL (ref 9–12.9)
RBC # BLD AUTO: 3.95 M/UL (ref 4.7–6.1)
WBC # BLD AUTO: 5.7 K/UL (ref 4.8–10.8)

## 2019-08-26 PROCEDURE — 700102 HCHG RX REV CODE 250 W/ 637 OVERRIDE(OP): Performed by: STUDENT IN AN ORGANIZED HEALTH CARE EDUCATION/TRAINING PROGRAM

## 2019-08-26 PROCEDURE — 99233 SBSQ HOSP IP/OBS HIGH 50: CPT | Performed by: INTERNAL MEDICINE

## 2019-08-26 PROCEDURE — 700105 HCHG RX REV CODE 258: Performed by: INTERNAL MEDICINE

## 2019-08-26 PROCEDURE — 700111 HCHG RX REV CODE 636 W/ 250 OVERRIDE (IP): Performed by: INTERNAL MEDICINE

## 2019-08-26 PROCEDURE — 770001 HCHG ROOM/CARE - MED/SURG/GYN PRIV*

## 2019-08-26 PROCEDURE — 05HY33Z INSERTION OF INFUSION DEVICE INTO UPPER VEIN, PERCUTANEOUS APPROACH: ICD-10-PCS

## 2019-08-26 PROCEDURE — 82550 ASSAY OF CK (CPK): CPT

## 2019-08-26 PROCEDURE — 700102 HCHG RX REV CODE 250 W/ 637 OVERRIDE(OP): Performed by: FAMILY MEDICINE

## 2019-08-26 PROCEDURE — 700111 HCHG RX REV CODE 636 W/ 250 OVERRIDE (IP): Performed by: STUDENT IN AN ORGANIZED HEALTH CARE EDUCATION/TRAINING PROGRAM

## 2019-08-26 PROCEDURE — A9270 NON-COVERED ITEM OR SERVICE: HCPCS | Performed by: FAMILY MEDICINE

## 2019-08-26 PROCEDURE — 36573 INSJ PICC RS&I 5 YR+: CPT

## 2019-08-26 PROCEDURE — 700111 HCHG RX REV CODE 636 W/ 250 OVERRIDE (IP): Performed by: FAMILY MEDICINE

## 2019-08-26 PROCEDURE — 700105 HCHG RX REV CODE 258: Performed by: STUDENT IN AN ORGANIZED HEALTH CARE EDUCATION/TRAINING PROGRAM

## 2019-08-26 PROCEDURE — 85025 COMPLETE CBC W/AUTO DIFF WBC: CPT

## 2019-08-26 PROCEDURE — 82962 GLUCOSE BLOOD TEST: CPT

## 2019-08-26 PROCEDURE — 36415 COLL VENOUS BLD VENIPUNCTURE: CPT

## 2019-08-26 RX ADMIN — INSULIN HUMAN 6 UNITS: 100 INJECTION, SOLUTION PARENTERAL at 17:13

## 2019-08-26 RX ADMIN — ENOXAPARIN SODIUM 40 MG: 100 INJECTION SUBCUTANEOUS at 04:26

## 2019-08-26 RX ADMIN — AMPICILLIN AND SULBACTAM 3 G: 2; 1 INJECTION, POWDER, FOR SOLUTION INTRAVENOUS at 04:45

## 2019-08-26 RX ADMIN — ASPIRIN 81 MG: 81 TABLET, COATED ORAL at 04:26

## 2019-08-26 RX ADMIN — INSULIN GLARGINE 10 UNITS: 100 INJECTION, SOLUTION SUBCUTANEOUS at 17:14

## 2019-08-26 RX ADMIN — SODIUM HYPOCHLORITE 473 ML: 1.25 SOLUTION TOPICAL at 04:31

## 2019-08-26 RX ADMIN — INSULIN HUMAN 5 UNITS: 100 INJECTION, SOLUTION PARENTERAL at 13:01

## 2019-08-26 RX ADMIN — INSULIN GLARGINE 10 UNITS: 100 INJECTION, SOLUTION SUBCUTANEOUS at 06:14

## 2019-08-26 RX ADMIN — INSULIN HUMAN 5 UNITS: 100 INJECTION, SOLUTION PARENTERAL at 22:08

## 2019-08-26 RX ADMIN — SODIUM HYPOCHLORITE 1 ML: 1.25 SOLUTION TOPICAL at 18:00

## 2019-08-26 RX ADMIN — DAPTOMYCIN 600 MG: 500 INJECTION, POWDER, LYOPHILIZED, FOR SOLUTION INTRAVENOUS at 13:14

## 2019-08-26 RX ADMIN — INSULIN HUMAN 2 UNITS: 100 INJECTION, SOLUTION PARENTERAL at 06:14

## 2019-08-26 ASSESSMENT — ENCOUNTER SYMPTOMS
HEADACHES: 0
DIZZINESS: 0
CHILLS: 0
VOMITING: 0
SENSORY CHANGE: 0
SPEECH CHANGE: 0
FEVER: 0
NAUSEA: 0
COUGH: 0
SHORTNESS OF BREATH: 0

## 2019-08-26 NOTE — DISCHARGE SUMMARY
[facility]        Progress Note and DISCHARGE SUMMARY   Senior resident: Leti Hi MD, PGY-2  [place]veen resident: Giovanny Mills MD, PGY-1  Attending: Dr. Modi    PATIENT ID:  Name:             Israel Aviles   YOB: 1965  Age:                 53 y.o.  male   MRN:               [id number]  Address:         [address] [place] [id number]  Phone:            [phone] (home)    ADMISSION DATE:   8/21/2019    DISCHARGE DATE:   8/27/2019    DISCHARGE DIAGNOSES:   Left foot ulcer  Osteomyelitis   DM2  Peripheral vascular disease  Hx of CVA    CONSULTANTS:    ID (Dr. Victoria and Dr. Gonzalez)  Vascular surgery     PROCEDURES:    None    IMAGING:   US-EXTREMITY ARTERY LOWER BILAT   Final Result      US-ALYSSA SINGLE LEVEL BILAT   Final Result      MR-FOOT-WITH & W/O LEFT   Final Result      1.  First metatarsal-phalangeal joint septic arthropathy      2.  Osteomyelitis affecting the entire first metatarsal, proximal phalanx and sesamoids. Edema and enhancement in the adjacent medial cuneiform could be reactive or secondary to early septic arthropathy and osteomyelitis centered at the first TMT joint      3.  Suspicious for early second medial metatarsal head osteomyelitis. Septic arthropathy cannot be excluded      4.  Medial first metatarsal-phalangeal centered greater than dorsal medial forefoot skin ulceration with cellulitis and phlegmonous change. No abscess identified      5.  Second metatarsal shaft edema is more likely from stress response than infection      6.  Plantar fasciitis      DX-FOOT-COMPLETE 3+ LEFT   Final Result      1.  Soft tissue swelling and defect about the great toe MTP joint.   2.  Abnormal lucency is again noted about the first MTP joint suggestive of osteomyelitis/infection. Further evaluation with MRI without and with contrast is suggested.      US-EXTREMITY ARTERY LOWER BILAT    (Results Pending)   IR-PICC LINE PLACEMENT W/ GUIDANCE > AGE 5     (Results Pending)       PHYSICAL EXAM:   General: No acute distress, afebrile, resting comfortably  HEENT: NC/AT. EOMI.   Cardiovascular: RRR without murmurs. Normal capillary refill   Respiratory: CTAB, no tachypnea or retractions  Abdomen: soft, nontender, nondistended, no masses  EXT:  SILVA, no edema, left foot remains wrapped with protective dressing, there is a 2 shallow ulcers on the 2nd and 3rd left toes, there is no proximally spreading erythematous streaks, there is no calf tenderness  Skin: No erythema/lesions except for covered portion of the left dorsal foot  Neuro: Non-focal    LABS:  Recent Labs     08/23/19  0757 08/24/19  0421 08/25/19  0337   WBC 6.7 5.8 6.0   RBC 3.95* 4.02* 3.84*   HEMOGLOBIN 11.0* 11.1* 11.1*   HEMATOCRIT 35.5* 36.5* 35.1*   MCV 89.9 90.8 91.4   MCH 27.8 27.6 28.9   RDW 45.1 45.8 45.7   PLATELETCT 235 236 237   MPV 9.9 9.8 10.1   NEUTSPOLYS 58.20 55.20 53.00   LYMPHOCYTES 30.10 33.80 35.50   MONOCYTES 9.20 8.40 9.50   EOSINOPHILS 1.60 1.90 1.30   BASOPHILS 0.60 0.50 0.50     Recent Labs     08/23/19  0757   SODIUM 135   POTASSIUM 3.8   CHLORIDE 103   CO2 27   GLUCOSE 184*   BUN 10     Lab Results   Component Value Date/Time    CHOLSTRLTOT 262 (H) 06/15/2018 10:20 PM     (H) 06/15/2018 10:20 PM    HDL 54 06/15/2018 10:20 PM    TRIGLYCERIDE 132 06/15/2018 10:20 PM       Lab Results   Component Value Date/Time    TROPONINI <0.01 06/15/2018 10:20 PM       Lab Results   Component Value Date/Time    TROPONINI <0.01 06/15/2018 10:20 PM          HOSPITAL COURSE:   Israel Aviles is a 53 y.o. male with a history of DM2, HTN, PVD, CVA, and a left foot ulcer admitted for a worsening left foot ulcer and osteomyelitis.    Patient's foot ulcer began on his left foot in March 2019. Patient was being treated by the wound clinic and received a round of abx treatment. In June 2019, the ulcer worsened and patient was admitted for further care. Dr. Chatterjee of vascular surgery performed several  procedures including dorsalis pedis angioplasty, anterior tibial artery angioplasty, and posterior tibial artery angioplasty. Patient was discharged home and per his report did not receive any antibiotic. He returned to the ED on 8/21 per advice of the wound clinic as his wound continues to worsen.     MRI showed first MPJ with septic arthropathy, osteomyelitis of entire first metatarsal, proximal phalanx and sesamoids, early second medial metatarsal head osteomyelitis. Patient was evaluated by ID and Vascular surgery who both advised BKA based on the extent of the patient's osteomyelitis.     Team had several long discussions with the patient and his family regarding care. Patient is not interested in pursing an amputation at this time and would like to only proceed with a long course of antibiotics. We explained to the patient that possible complications of not performing the amputation include possible worsening of the osteomyelitis, leading to an AKA, sepsis and even death. Patient and his family understand the risks and would like to proceed with antibiotics alone.     Wound care has been following the case and suggested a Veraflo wound vac for treatment of the patient's ulcer. Patient would be required to be placed in an LTAC/SNF for management of the vac. Patient is not interested in discharge to a facility and is requesting to go home only.     Patient had PICC line placed 8/26/19 and ABX were transitioned to IV Daptomycin per ID and sensitivities. The patient received education/teaching on his home IV ABX from [facility] on 8/27/19 and was accepted at [facility]. He was discharged home in stable condition with follow up appointment to be scheduled with ID in 1-2 weeks post discharge, Wound Care the following week and with his PCP in one week from hospital discharge.     Return precautions were given.   Medication changes: None   New medications: IV Daptomycin daily for 6 weeks    DISCHARGE CONDITION:     Stable    DISPOSITION:   Home w/ Home Health Care    DISCHARGE MEDICATIONS:      Medication List      CONTINUE taking these medications      Instructions   aspirin 81 MG EC tablet   Take 1 Tab by mouth every day.  Dose:  81 mg     atorvastatin 40 MG Tabs  Commonly known as:  LIPITOR   Take 40 mg by mouth every evening.  Dose:  40 mg     clopidogrel 75 MG Tabs  Commonly known as:  PLAVIX   Take 75 mg by mouth every bedtime.  Dose:  75 mg     glipiZIDE 5 MG Tabs  Commonly known as:  GLUCOTROL   Take 5 mg by mouth 2 times a day.  Dose:  5 mg     metformin 1000 MG tablet  Commonly known as:  GLUCOPHAGE   Take 1,000 mg by mouth 2 times a day, with meals.  Dose:  1,000 mg        STOP taking these medications    amoxicillin-clavulanate 875-125 MG Tabs  Commonly known as:  AUGMENTIN            ACTIVITY:   Normal Activity as Tolerated.    DIET:   Healthy    DISCHARGE INSTRUCTIONS AND FOLLOW UP:  Patient is medically stable for discharge and will be discharged to home with family on    Follow Up:   1. ID ([facility]) in 1-2 weeks from discharge  2. Vascular surgery Dr. Chatterjee as needed  3. PCP within one week of hospital discharge  4. [facility] next week as scheduled    Discharge Instructions:   Patient was instructed to return the ER in the event of worsening symptoms including but not limited to persistent nausea/vomiting, persistent ever >100.4F, problems breathing, problems eating, worsening left foot pain or any other major concerns. Patient understands that failure to do so may indicate worsening of his medical condition(s) and result in adverse clinical outcomes including fatality. We have counseled the patient on the importance of compliance and the patient has agreed to proceed with all medical recommendations and follow up plan indicated above. The patient understands that the failure to do so may result in result in adverse clinical outcomes including fatality.       CC: Mj Bai,  M.D.

## 2019-08-26 NOTE — PROGRESS NOTES
Infectious Disease Progress Note    Author: Angie Gonzalez M.D. Date & Time of service: 2019  12:28 PM    Chief Complaint:  Follow-up for left diabetic foot infection/osteomyelitis    Interval History:  53 y.o. diabetic male admitted 2019 due to nonhealing wounds left foot  2019 no fevers.  W BC 6.7 foot wound is showing staph aureus   afebrile, WBC 5.8 ongoing left foot pain.  Patient continues to refuse amputation and wants to continue aggressive antibiotic therapy.  Wound culture+ MSSA.  Plan of care with PICC line and 6 weeks of IV antibiotics discussed with patient and family at bedside   afebrile WBC 6 no new clinical issues overnight.  Plan for PICC line placement today.  Patient would like to do home IV antibiotics.   AF got PICC this am-refused yesterday-has continued to refused debridement. Review of the ound shows ischemic ulcerations and necrotic eschar    Labs Reviewed.Wound reviewed    Review of Systems:  Review of Systems   Constitutional: Negative for chills, fever and malaise/fatigue.   HENT: Negative for hearing loss.    Respiratory: Negative for cough and shortness of breath.    Cardiovascular: Negative for chest pain and leg swelling.   Gastrointestinal: Negative for nausea and vomiting.   Genitourinary: Negative for dysuria.   Neurological: Negative for dizziness, sensory change, speech change and headaches.   All other systems reviewed and are negative.      Hemodynamics:  Temp (24hrs), Av.5 °C (97.7 °F), Min:36.4 °C (97.5 °F), Max:36.6 °C (97.9 °F)  Temperature: 36.6 °C (97.9 °F)  Pulse  Av.6  Min: 83  Max: 108   Blood Pressure: 112/72       Physical Exam:  Physical Exam   Constitutional: He is oriented to person, place, and time. No distress.   HENT:   Mouth/Throat: No oropharyngeal exudate.   Eyes: Pupils are equal, round, and reactive to light. No scleral icterus.   Neck: Neck supple. No JVD present.   Cardiovascular: Regular rhythm.   No murmur  heard.  Pulmonary/Chest: No stridor. He has no wheezes. He has no rales.   Abdominal: Soft. He exhibits no distension. There is no tenderness.   Musculoskeletal: He exhibits edema. He exhibits no tenderness.   Left foot :ischemic eschars multiple digits  Wound on the dorsomedial surface with bone visible-necrotic eschar  LUE PICC   Lymphadenopathy:     He has no cervical adenopathy.   Neurological: He is alert and oriented to person, place, and time. No cranial nerve deficit.   Skin: Skin is warm. No erythema.   Vitals reviewed.      Meds:    Current Facility-Administered Medications:   •  DAPTOmycin IVPB ([facility] only)  •  insulin glargine  •  insulin glargine  •  atorvastatin  •  dakins 0.125% (1/4 strength)  •  senna-docusate **AND** polyethylene glycol/lytes **AND** magnesium hydroxide **AND** bisacodyl  •  enoxaparin  •  enalaprilat  •  ondansetron  •  ondansetron  •  promethazine  •  promethazine  •  prochlorperazine  •  acetaminophen  •  insulin regular **AND** Accu-Chek ACHS **AND** NOTIFY MD and PharmD **AND** glucose **AND** dextrose 10% bolus  •  oxyCODONE immediate-release  •  aspirin    Labs:  Recent Labs     08/24/19  0421 08/25/19  0337 08/26/19  0400   WBC 5.8 6.0 5.7   RBC 4.02* 3.84* 3.95*   HEMOGLOBIN 11.1* 11.1* 11.3*   HEMATOCRIT 36.5* 35.1* 36.2*   MCV 90.8 91.4 91.6   MCH 27.6 28.9 28.6   RDW 45.8 45.7 45.8   PLATELETCT 236 237 229   MPV 9.8 10.1 10.1   NEUTSPOLYS 55.20 53.00 50.90   LYMPHOCYTES 33.80 35.50 36.70   MONOCYTES 8.40 9.50 9.90   EOSINOPHILS 1.90 1.30 1.80   BASOPHILS 0.50 0.50 0.50     No results for input(s): SODIUM, POTASSIUM, CHLORIDE, CO2, GLUCOSE, BUN, CPKTOTAL in the last 72 hours.  No results for input(s): ALBUMIN, TBILIRUBIN, ALKPHOSPHAT, TOTPROTEIN, ALTSGPT, ASTSGOT, CREATININE in the last 72 hours.    Imaging:  Dx-foot-complete 3+ Left    Result Date: 8/21/2019 8/21/2019 11:55 AM HISTORY/REASON FOR EXAM:  Atraumatic Pain/Swelling/Deformity; ? osteomyelitis  Swelling, ulcer, diabetes TECHNIQUE/EXAM DESCRIPTION AND NUMBER OF VIEWS: 3 views of the LEFT foot. COMPARISON:  8/16/2019 FINDINGS: Redemonstrated is soft tissue defect along the medial aspect of the foot at the first MTP joint. There is abnormal lucency involving the first metatarsal head and the proximal phalangeal base which is suspicious for osteomyelitis and possibly septic arthritis. There is significant joint space narrowing of the MTP joint. No acute fracture or dislocation is seen. Alignment is anatomic. Small Achilles and plantar calcaneal spurs. There are atherosclerotic arterial calcifications.     1.  Soft tissue swelling and defect about the great toe MTP joint. 2.  Abnormal lucency is again noted about the first MTP joint suggestive of osteomyelitis/infection. Further evaluation with MRI without and with contrast is suggested.    Dx-foot-complete 3+ Left    Result Date: 8/16/2019 8/16/2019 4:11 PM HISTORY/REASON FOR EXAM:  Rule out osteomyelitis TECHNIQUE/EXAM DESCRIPTION AND NUMBER OF VIEWS: 3 views of the LEFT foot. COMPARISON:  5/17/2019. FINDINGS:  There is indistinctness of the cortical surfaces first metatarsal-phalangeal joint which are suggestive of osteomyelitis. Bony alignment is anatomic. There is a large soft tissue defect over the dorsal and medial surface of the first metatarsophalangeal joint. No acute fracture or dislocation is identified.     1.  Indistinctness of the cortical surfaces first metatarsal-phalangeal joint which are suggestive of osteomyelitis. Consider MRI with and without contrast for further evaluation. 2.  Large soft tissue defect of the dorsal and medial surface of the first metatarsophalangeal joint.    Mr-foot-with & W/o Left    Result Date: 8/22/2019 8/21/2019 9:44 PM HISTORY/REASON FOR EXAM:  Diabetes with soft tissue infection TECHNIQUE/EXAM DESCRIPTION: MRI of the LEFT foot with and without contrast. Using a CitalDoc 1.5 Luisa MRI scanner, T1 axial,  sagittal, and coronal, fast spin-echo T2 fat-suppressed axial and coronal, fast inversion recovery sagittal, and T1 fat suppressed axial and sagittal post intravenous contrast images were obtained. A total of 15 mL ProHance given. COMPARISON:  Radiographs 8/21/2019 FINDINGS: The distal aspect of the phalanges are not well seen due to some artifact, inhomogeneous fat saturation First metatarsal-phalangeal centered increased T2, decreased T1 medullary space signals identified. The entire first metatarsus is affected as is the proximal phalanx. There is an metatarsal-phalangeal effusion. There is also slight edema and enhancement  in the medial cuneiform. No effusion. The first metatarsal head sesamoids are edematous and there is bony destruction posteriorly in the lateral sesamoid. There is also bony destruction involving the lateral aspect of the first metatarsal-phalangeal joint. Adjacent skin ulceration is seen medial to the metatarsal-phalangeal joint. No soft tissue abscess is detected. There is a moderate forefoot skin thickening and soft tissue enhancement indicating cellulitis and phlegmonous change. No other bony destruction is seen but there is some edema in the adjacent second metatarsal head. This enhances coronal image 34. There is slight associated decreased T1 signal precontrast. No joint effusion is confirmed. No ligament or tendon tear is confirmed. No retraction noted. There is plantar fat pad decreased T1, mildly increased T2 and enhanced T1 signal overlying the calcaneus suggesting post contusion related change. Plantar fascia is moderately thickened and there is a calcaneus spur.     1.  First metatarsal-phalangeal joint septic arthropathy 2.  Osteomyelitis affecting the entire first metatarsal, proximal phalanx and sesamoids. Edema and enhancement in the adjacent medial cuneiform could be reactive or secondary to early septic arthropathy and osteomyelitis centered at the first TMT joint 3.   Suspicious for early second medial metatarsal head osteomyelitis. Septic arthropathy cannot be excluded 4.  Medial first metatarsal-phalangeal centered greater than dorsal medial forefoot skin ulceration with cellulitis and phlegmonous change. No abscess identified 5.  Second metatarsal shaft edema is more likely from stress response than infection 6.  Plantar fasciitis    Us-simone Single Level Bilat    Result Date: 2019   Vascular Laboratory  Conclusions  Monophasic  waveforms with low amplitude, right greater than left.  Decreased SIMONE on the right. Findings are suggestive of significant arterial  disease  TRELL CASTLE  Age:    53    Gender:     M  MRN:    [id number]  :    1965      BSA:  Exam Date:     2019 16:01  Room #:     Inpatient  Priority:     Routine  Ht (in):             Wt (lb):  Ordering Physician:        JENNIFER CLEANING  Referring Physician:  Sonographer:               Nettie Guzman RDCS, RVT  Study Type:                Complete Bilateral  Technical Quality:         Adequate  Indications:     Ulceration of LE  CPT Codes:       33595  ICD Codes:       707.1  History:         Ulcerations  Limitations:                 RIGHT  Waveform            Systolic BPs (mmHg)                             137           Brachial                                           Common Femoral  Monophasic                 97            Posterior Tibial  Monophasic                 101           Dorsalis Pedis                                           Peroneal                             0.74          SIMONE                                           TBI                       LEFT  Waveform        Systolic BPs (mmHg)                             132           Brachial                                           Common Femoral  Monophasic                 152           Posterior Tibial  Monophasic                 168           Dorsalis Pedis                                            Peroneal                             1.23          ALYSSA                                           TBI  Findings  Right.  Doppler waveforms at the ankle are monophasic with low  amplitude.  Ankle-brachial index is moderately reduced.  Left.  Doppler waveforms at the ankle are monophasic with  low amplitude.  Ankle-brachial index is falsly normal.  Mitzi Diggs  (Electronically Signed)  Final Date:      2019                   16:44    Us-extremity Artery Lower Bilat    Result Date: 2019  Lower Extremity  Arterial Duplex Report  Vascular Laboratory  CONCLUSIONS  Diffuse, laminar lower extremity arterial plaquing and calcium noted.  Bilaterally dampened distal waveforms suggesive of distal aterial disease  as detailed.  No noted focal arterial stenosis.  Compared with the prior study of May - it appears revascularization has  occurred in the interim.  TRELL CASTLE  Exam Date:     2019 17:58  Room #:     Inpatient  Priority:     Routine  Ht (in):             Wt (lb):  Ordering Physician:        JENNIFER CLEANING  Referring Physician:  Sonographer:               Chas Jo RVT, RDMS  Study Type:                Complete Bilateral  Technical Quality:         Adequate  Age:    53    Gender:     M  MRN:    [id number]  :    1965      BSA:  Indications:     PVD  CPT Codes:       70857  ICD Codes:       443.9  History:         Prior duplex on 19. Non-healing ulcerations on both                   feet.  Limitations:     Dropout due to calcification of vessels.                RIGHT  Waveform        Peak Systolic Velocity (cm/s)                  Prox    Prox-Mid  Mid    Mid-Dist  Distal  Triphasic                         91                       CFA  Triphasic       92                                         PFA  Triphasic       70                89               58      SFA  Triphasic                         54                       POP  Monophasic      30                                  16      AT  Monophasic      57                                 55      PT  Monophasic      104                                28      KAYKAY                LEFT  Waveform        Peak Systolic Velocity (cm/s)                  Prox    Prox-Mid  Mid    Mid-Dist  Distal  Triphasic                         68                       CFA  Triphasic       69                                         PFA  Triphasic       72                84               63      SFA  Biphasic                          92                       POP  Monophasic      68                                 53      AT  Monophasic      37                                 41      PT  Monophasic      118                                10      KAYKAY  FINDINGS  Right.  Mild plaque and calcifications throughout the extremity.  Triphasic waveforms and normal velocities seen from the common femoral to  the popliteal artery.  Possible ulceration of distal femoral artery vs. artifact.  Monophasic waveforms in the posterior tibial, anterior tibial and peroneal  arteries.  Left.  Mild plaque and calcifications throughout the extremity.  Triphasic waveforms with normal velocities seen from the common femoral to  the popliteal artery.  Monophasic waveforms found in the posterior tibial, anterior tibial, and  peroneal arteries.  Severely dampened waveform in the distal peroneal artery.  Shanda Tobias M.D.  (Electronically Signed)  Final Date:      22 August 2019                   20:37      Micro:  Results     Procedure Component Value Units Date/Time    CULTURE WOUND W/ GRAM STAIN [[id number]]  (Abnormal)  (Susceptibility) Collected:  08/22/19 0617    Order Status:  Completed Specimen:  Wound from Left Foot Updated:  08/24/19 1350     Significant Indicator POS     Source WND     Site LEFT FOOT     Culture Result Light growth mixed skin ester.     Gram Stain Result Few WBCs.  Rare Gram positive cocci.       Culture Result Staphylococcus  aureus  Moderate growth  This isolate is presumed to be clindamycin resistant based on  detection of inducible resistance.  Clindamycin may still  be effective in some patients.        Enterococcus faecalis  Rare growth  Combination therapy with ampicillin, penicillin, or  vancomycin (for susceptible strains) plus an aminoglycoside  is usually indicated for serious enterococcal infections,  such as endocarditis unless high-level resistance to both  gentamicin and streptomycin is documented; such combinations  are predicted to result in synergistic killing of the  Enterococcus.      Susceptibility     Staphylococcus aureus (1)     Antibiotic Interpretation Microscan Method Status    Clindamycin Resistant <=0.5 mcg/mL RAMIRO Final    Daptomycin Sensitive <=0.5 mcg/mL RAMIRO Final    Erythromycin Resistant >4 mcg/mL RAMIRO Final    Moxifloxacin Sensitive <=0.5 mcg/mL RAMIRO Final    Oxacillin Sensitive <=0.25 mcg/mL RAMIRO Final    Ampicillin/sulbactam Sensitive <=8/4 mcg/mL RAMIRO Final    Penicillin Resistant >8 mcg/mL RAMIRO Final    Trimeth/Sulfa Sensitive <=0.5/9.5 mcg/mL RAMIRO Final    Tetracycline Sensitive <=4 mcg/mL RAMIRO Final    Vancomycin Sensitive 1 mcg/mL RAMIRO Final          Enterococcus faecalis (2)     Antibiotic Interpretation Microscan Method Status    Daptomycin Sensitive 2 mcg/mL RAMIRO Final    Penicillin Sensitive 2 mcg/mL RAMIRO Final    Gent Synergy Sensitive <=500 mcg/mL RAMIRO Final    Vancomycin Sensitive 2 mcg/mL RAMIRO Final    Ampicillin Sensitive <=2 mcg/mL RAMIRO Final                   BLOOD CULTURE [[id number]] Collected:  08/22/19 1203    Order Status:  Completed Specimen:  Blood from Peripheral Updated:  08/23/19 0921     Significant Indicator NEG     Source BLD     Site PERIPHERAL     Culture Result No Growth  Note: Blood cultures are incubated for 5 days and  are monitored continuously.Positive blood cultures  are called to the RN and reported as soon as  they are identified.      Narrative:       Per Hospital Policy: Only  "change Specimen Src: to \"Line\" if  specified by physician order.  Left AC    BLOOD CULTURE [[id number]] Collected:  08/22/19 1203    Order Status:  Completed Specimen:  Blood from Peripheral Updated:  08/23/19 0921     Significant Indicator NEG     Source BLD     Site PERIPHERAL     Culture Result No Growth  Note: Blood cultures are incubated for 5 days and  are monitored continuously.Positive blood cultures  are called to the RN and reported as soon as  they are identified.      Narrative:       Per Hospital Policy: Only change Specimen Src: to \"Line\" if  specified by physician order.  Left Forearm/Arm    GRAM STAIN [[id number]] Collected:  08/22/19 0617    Order Status:  Completed Specimen:  Wound Updated:  08/22/19 1215     Significant Indicator .     Source WND     Site LEFT FOOT     Gram Stain Result Few WBCs.  Rare Gram positive cocci.            Assessment:  Active Hospital Problems    Diagnosis   • *Osteomyelitis of left foot ([facility]) [M86.9]   • Peripheral arterial disease ([facility]) [I73.9]   • Normocytic anemia [D64.9]   • Diabetic foot ([facility]) [E11.8]   • Uncontrolled type 2 diabetes mellitus with hyperglycemia ([facility]) [E11.65]       Plan:  Left diabetic foot infection with underlying osteomyelitis  Afebrile  No leukocytosis  Cultures + MSSA, ampicillin sensitive Efaecalis  Recommend amp as wounds unlikely to heal-Patient refusing   Change to daptomycin 8 mg/KG IV daily  Anticipate 6 weeks of IV antibiotics total-advised abx will likely fail  Ultimately may require TMA versus BKA   Wound care  Abx orders done-stop date 10/2/2019  Hold statin while on dapto    Peripheral vascular disease  s/p revascularization-unclear if will help wound healing  Anticipated poor wound healing    Uncontrolled type II diabetes mellitus  Glycosylated hemoglobin 7.7  Keep BS under 150 to help control current infection      Disposition: Home versus R-OPIC.  He can do daptomycin 8 mg/kg daily.  He will need to hold his atorvastatin while on " daptomycin.    I have performed a physical exam and reviewed and updated ROS and plan today 8/26/2019.  In review of yesterday's note 8/25/2019, there are no changes except as documented above.    Prognosis for limb salvage poor  Discussed with internal medicine [facility] resident

## 2019-08-26 NOTE — PROCEDURES
Vascular Access Team     Date of Insertion: 8/26/19  Arm Circumference: 29  Internal length: 52  External Length: 3  Vein Occupancy %: 37  Reason for PICC: antibiotics  Labs: WBC 6, , BUN 10, Cr 0.74, GFR >60, INR none    Consents confirmed, vessel patency confirmed with ultrasound. Risks and benefits of procedure explained to patient and education regarding central line associated bloodstream infections provided. Questions answered.     PICC placed in LUE per MD order with ultrasound guidance, initial arm circumference 29cm. 4 Fr, 01 lumen PICC placed in basilic vein after 01 attempt(s). 2 cc's of 1% lidocaine injected intradermally, 21 gauge microintroducer needle and modified Seldinger technique used. 52 cm catheter inserted with good blood return. Secured at 3 cm marker. Each lumen flushed without resistance with 10 mL 0.9% normal saline. PICC line secured with Biopatch and Tegaderm.     PICC placement is confirmed by nurse using 3CG technology. PICC line is appropriate for use at this time. Patient tolerated procedure well, without complications.  Patient condition relayed to unit RN or ordering physician via this post procedure note in the EMR.     Ultrasound images uploaded to PACS and viewable in the EMR - yes  Ultrasound imaged printed and placed in paper chart - no     Swift Identity Power PICC ref # [id number], Lot # [id number]

## 2019-08-26 NOTE — DISCHARGE PLANNING
Received Choice form at 1450  Agency/Facility Name: [facility]  Referral sent per Choice form at 1455     Received Choice form at 1450  Agency/Facility Name: [facility]   Referral sent per Choice form at 1455

## 2019-08-26 NOTE — CARE PLAN
Pt denies pain at this time   Pt call appropriately bed in low locked position, clutter free environment

## 2019-08-26 NOTE — PROGRESS NOTES
Bedside shift report received from night RN.  Assumed care at 0715, patient sleeping in bed, belongings and call light within reach, PIV assessed CDI, dressing drain visualized CDI. Needs met at this time.    Reviewed current POC with family present. POC review with CNA including vital sign frequency/drains/mobility.  Labs, notes, orders reviewed at this time.

## 2019-08-26 NOTE — PROGRESS NOTES
Interim up date:    Spoke with Dr. Angie Gonzalez who is on-call for ID today to clarify orders in preparation for patient's discharge home today.    - Home Health orders have not been placed for Daptomycin, she will do so.     - Will also need follow up with ID outpatient as well in the next 1-2 week.     - Spoke with pharmacy confirmed dose of Daptomycin. Switched IV Unasyn to Daptomycin.     Plan:   Pending placement of PICC line and arrangement of home health, goal to discharge home today.

## 2019-08-26 NOTE — PROGRESS NOTES
Discussed PICC line placement with patient at bedside.  Questions and concerns addressed and education provided r/t placement, indications, and care of line.  Patient signed consent but requested that we place the line in the morning tomorrow (8/26/19).  We will plan to place the PICC line in the morning tomorrow.  Please call ext [phone] for concerns.

## 2019-08-26 NOTE — PROGRESS NOTES
[facility] PROGRESS NOTE     Attending:   Dr. Kings Mcnally MD    Resident:   Leti Hi MD, PGY-2    PATIENT:   Israel Aviles; [id number]; 1965    ID:   53 y.o. Male with a history of type 2 diabetes, hypertension, CVA admitted for left foot ulcer and osteomyelitis of the left foot 8/21/19. HD #6.     Hospital course: He as had a chronic non healing left dorsal foot ulcer starting in 03/2019. Pmhx: angioplasty x3. MRI consistent with osteo and culture + for MSSA and Enterobacter. ID and Vascular surgery consulted both recommending BKA but patient refused. IV ABX recommended OP x6 weeks.      SUBJECTIVE:   No acute events overnight, patient is doing well. His left foot pain at baseline, still refusing BKA and SNF placement. He denies any other symptoms at this time. PICC line placement anticipated this morning.     OBJECTIVE:  Patient Vitals for the past 24 hrs:   BP Temp Temp src Pulse Resp SpO2   08/26/19 0500 132/71 36.4 °C (97.6 °F) Temporal 84 17 96 %   08/25/19 2100 123/78 36.5 °C (97.7 °F) Temporal 92 18 94 %   08/25/19 1600 135/79 36.4 °C (97.5 °F) Temporal 88 18 95 %   08/25/19 0800 132/85 36.7 °C (98.1 °F) Temporal 85 17 96 %       Intake/Output Summary (Last 24 hours) at 8/25/2019 0646    Intake/Output Summary (Last 24 hours) at 8/26/2019 0611  Last data filed at 8/25/2019 1800  Gross per 24 hour   Intake 780 ml   Output --   Net 780 ml         PHYSICAL EXAM:  General: No acute distress, afebrile, resting comfortably  HEENT: NC/AT. EOMI.   Cardiovascular: RRR without murmurs. Normal capillary refill   Respiratory: CTAB, no tachypnea or retractions  Abdomen: soft, nontender, nondistended, no masses  EXT:  SILVA, no edema. Left foot wrapped in gauze without streaking noted, there are 2 shallow ulcers noted to the dorsal aspect of the the 2nd/3rd toes on the left  Skin: No erythema/lesions   Neuro: Non-focal    LABS:  Recent Labs     08/24/19  0421 08/25/19  0337 08/26/19  0400   WBC  5.8 6.0 5.7   RBC 4.02* 3.84* 3.95*   HEMOGLOBIN 11.1* 11.1* 11.3*   HEMATOCRIT 36.5* 35.1* 36.2*   MCV 90.8 91.4 91.6   MCH 27.6 28.9 28.6   RDW 45.8 45.7 45.8   PLATELETCT 236 237 229   MPV 9.8 10.1 10.1   NEUTSPOLYS 55.20 53.00 50.90   LYMPHOCYTES 33.80 35.50 36.70   MONOCYTES 8.40 9.50 9.90   EOSINOPHILS 1.90 1.30 1.80   BASOPHILS 0.50 0.50 0.50     Recent Labs     08/23/19  0757   SODIUM 135   POTASSIUM 3.8   CHLORIDE 103   CO2 27   BUN 10   CREATININE 0.74   CALCIUM 9.0     Estimated GFR/CRCL = Estimated Creatinine Clearance: 122.5 mL/min (by C-G formula based on SCr of 0.74 mg/dL).  Recent Labs     08/23/19  0757  08/25/19  1109 08/25/19  1656 08/25/19  2040   GLUCOSE 184*  --   --   --   --    POCGLUCOSE  --    < > 101* 281* 257*    < > = values in this interval not displayed.                 No results for input(s): INR, APTT, FIBRINOGEN in the last 72 hours.    Invalid input(s): DIMER      IMAGING:  US-EXTREMITY ARTERY LOWER BILAT   Final Result      US-ALYSSA SINGLE LEVEL BILAT   Final Result      MR-FOOT-WITH & W/O LEFT   Final Result      1.  First metatarsal-phalangeal joint septic arthropathy      2.  Osteomyelitis affecting the entire first metatarsal, proximal phalanx and sesamoids. Edema and enhancement in the adjacent medial cuneiform could be reactive or secondary to early septic arthropathy and osteomyelitis centered at the first TMT joint      3.  Suspicious for early second medial metatarsal head osteomyelitis. Septic arthropathy cannot be excluded      4.  Medial first metatarsal-phalangeal centered greater than dorsal medial forefoot skin ulceration with cellulitis and phlegmonous change. No abscess identified      5.  Second metatarsal shaft edema is more likely from stress response than infection      6.  Plantar fasciitis      DX-FOOT-COMPLETE 3+ LEFT   Final Result      1.  Soft tissue swelling and defect about the great toe MTP joint.   2.  Abnormal lucency is again noted about the first  MTP joint suggestive of osteomyelitis/infection. Further evaluation with MRI without and with contrast is suggested.      US-EXTREMITY ARTERY LOWER BILAT    (Results Pending)   IR-PICC LINE PLACEMENT W/ GUIDANCE > AGE 5    (Results Pending)       MEDS:  Current Facility-Administered Medications   Medication Last Dose   • insulin glargine (LANTUS) injection 10 Units 10 Units at 08/25/19 1700   • insulin glargine (LANTUS) injection 10 Units Stopped at 08/25/19 1015   • ampicillin/sulbactam (UNASYN) 3 g in  mL IVPB 3 g at 08/26/19 0445   • atorvastatin (LIPITOR) tablet 80 mg 80 mg at 08/25/19 2038   • dakins 0.125% (1/4 strength) topical soln 473 mL at 08/26/19 0431   • senna-docusate (PERICOLACE or SENOKOT S) 8.6-50 MG per tablet 2 Tab Stopped at 08/25/19 1800    And   • polyethylene glycol/lytes (MIRALAX) PACKET 1 Packet      And   • magnesium hydroxide (MILK OF MAGNESIA) suspension 30 mL      And   • bisacodyl (DULCOLAX) suppository 10 mg     • enoxaparin (LOVENOX) inj 40 mg 40 mg at 08/26/19 0426   • enalaprilat (VASOTEC) injection 1.25 mg     • ondansetron (ZOFRAN) syringe/vial injection 4 mg     • ondansetron (ZOFRAN ODT) dispertab 4 mg     • promethazine (PHENERGAN) tablet 12.5-25 mg     • promethazine (PHENERGAN) suppository 12.5-25 mg     • prochlorperazine (COMPAZINE) injection 5-10 mg     • acetaminophen (TYLENOL) tablet 650 mg     • insulin regular (HUMULIN R) injection 2-9 Units 5 Units at 08/25/19 2040    And   • glucose 4 g chewable tablet 16 g      And   • DEXTROSE 10% BOLUS 250 mL     • oxyCODONE immediate-release (ROXICODONE) tablet 5 mg     • aspirin EC (ECOTRIN) tablet 81 mg 81 mg at 08/26/19 0426       ASSESSMENT/PLAN: 53 y.o. Male with a history of type 2 diabetes, hypertension, CVA admitted for left foot ulcer and osteomyelitis of the left foot 8/21/19. HD #6.     #Left foot wound  #Osteomyelitis, left foot  -Wound present since March 2019  -MRI of the foot:first metatarsal phalangeal joint  with septic arthropathy              -Early second medial metatarsal head osteomyelitis              -Osteomyelitis affecting the entire first metatarsal proximal phalanx and sesamoids              -Osteomyelitis entering the first TMT joint  -ID and vascular surgery consulted and following both w/ recs for BKA but patient refused  -Wound culture: Enterobacter and MSSA  - PICC line placement today  - Patient refused SNF so [facility] wound vac not an option  - Plan:  - Currently on IV Unasyn but last note from ID said Daptomycin, presumably for east of dosing and appropriate coverage  - Will touch base w/ ID before switching over to Daptomycin  - PICC line to be placed today  - After PICC line placement and ABX change will anticipate discharge home today     #Hx of PAD  #Hx of CVA, 6/2018  #Hyperlipidemia  -Patient is on aspirin and Plavix outpatient  -Past vascular procedures performed in June 2019:   -Dorsalis pedis angioplasty   -Anterior tibial artery angioplasty and arthrectomy   -Posterior tibial artery angioplasty  - Dr. Coates of vascular surgery is following the case and recommends a BKA  - LDL in June 2018 was elevated at 182  - Lipitor dose to 80 mg daily  Plan:  - Lipitor will be held during course of IV Daptomycin     #DM2- uncontrolled  - HgbA1C 7.7  -Glucose is variable from 101 - 281/day  -Patient on glipizide at home  -HA1c been as high as 12.3 in June 2018, review of [facility] records indicate patient has DM2 with detectable c-peptide   -Patient on Lantus and Humulin R for glucose control  - Plan:   - As patients sugars have been variable pending: GD65, islet cell AB and c peptide pending    #Anemia, normocytic   -Hemoglobin stable at 11.3   -Continue to monitor    Lines: PIV  Abx: Unasyn  DVT prophylaxis: Lovenox  CODE Status: Full    Leti Hi MD, PGY-2  [phone]  [facility] [employer]  Family Medicine Residency

## 2019-08-26 NOTE — DISCHARGE PLANNING
Case Management Note:  CM spoke with patient and family, choice obtained and faxed to [facility] for home health and home infusion.

## 2019-08-26 NOTE — CARE PLAN
Problem: Safety  Goal: Will remain free from falls  Outcome: PROGRESSING AS EXPECTED  Note:   Calls for assistance when appropriate. Call light and personal belongings within reach. Bed in low and locked position. Fall education provided to patient.       Problem: Infection  Goal: Will remain free from infection  Outcome: PROGRESSING AS EXPECTED  Note:   Dressing will remain CDI throughout shift. Dressing changes performed as ordered. Patient will receive IV/PO antibiotics as ordered and seen in eMAR. Lab values monitored daily, abnormals reported to MD.  Wound/dressing monitored for signs of infection including redness, swelling, warmth, odor, drainage every shift.  Standard precautions in use. Handwashing before and after patient contact. Education provided to patient regarding infection prevention techniques.

## 2019-08-27 ENCOUNTER — APPOINTMENT (OUTPATIENT)
Dept: WOUND CARE | Facility: MEDICAL CENTER | Age: 54
End: 2019-08-27
Attending: PHYSICIAN ASSISTANT
Payer: COMMERCIAL

## 2019-08-27 VITALS
SYSTOLIC BLOOD PRESSURE: 120 MMHG | HEIGHT: 72 IN | TEMPERATURE: 97.5 F | DIASTOLIC BLOOD PRESSURE: 77 MMHG | BODY MASS INDEX: 22.4 KG/M2 | OXYGEN SATURATION: 95 % | RESPIRATION RATE: 17 BRPM | WEIGHT: 165.34 LBS | HEART RATE: 86 BPM

## 2019-08-27 LAB
BACTERIA BLD CULT: NORMAL
BACTERIA BLD CULT: NORMAL
BASOPHILS # BLD AUTO: 0.5 % (ref 0–1.8)
BASOPHILS # BLD: 0.03 K/UL (ref 0–0.12)
C PEPTIDE SERPL-MCNC: 2.9 NG/ML (ref 0.8–3.5)
EOSINOPHIL # BLD AUTO: 0.08 K/UL (ref 0–0.51)
EOSINOPHIL NFR BLD: 1.3 % (ref 0–6.9)
ERYTHROCYTE [DISTWIDTH] IN BLOOD BY AUTOMATED COUNT: 45.1 FL (ref 35.9–50)
GAD65 AB SER IA-ACNC: <5 IU/ML (ref 0–5)
GLUCOSE BLD-MCNC: 161 MG/DL (ref 65–99)
HCT VFR BLD AUTO: 38 % (ref 42–52)
HGB BLD-MCNC: 11.7 G/DL (ref 14–18)
IMM GRANULOCYTES # BLD AUTO: 0.01 K/UL (ref 0–0.11)
IMM GRANULOCYTES NFR BLD AUTO: 0.2 % (ref 0–0.9)
LYMPHOCYTES # BLD AUTO: 2.18 K/UL (ref 1–4.8)
LYMPHOCYTES NFR BLD: 36 % (ref 22–41)
MCH RBC QN AUTO: 28.1 PG (ref 27–33)
MCHC RBC AUTO-ENTMCNC: 30.8 G/DL (ref 33.7–35.3)
MCV RBC AUTO: 91.1 FL (ref 81.4–97.8)
MONOCYTES # BLD AUTO: 0.42 K/UL (ref 0–0.85)
MONOCYTES NFR BLD AUTO: 6.9 % (ref 0–13.4)
NEUTROPHILS # BLD AUTO: 3.33 K/UL (ref 1.82–7.42)
NEUTROPHILS NFR BLD: 55.1 % (ref 44–72)
NRBC # BLD AUTO: 0 K/UL
NRBC BLD-RTO: 0 /100 WBC
PLATELET # BLD AUTO: 246 K/UL (ref 164–446)
PMV BLD AUTO: 9.6 FL (ref 9–12.9)
RBC # BLD AUTO: 4.17 M/UL (ref 4.7–6.1)
SIGNIFICANT IND 70042: NORMAL
SIGNIFICANT IND 70042: NORMAL
SITE SITE: NORMAL
SITE SITE: NORMAL
SOURCE SOURCE: NORMAL
SOURCE SOURCE: NORMAL
WBC # BLD AUTO: 6.1 K/UL (ref 4.8–10.8)

## 2019-08-27 PROCEDURE — 99233 SBSQ HOSP IP/OBS HIGH 50: CPT | Performed by: INTERNAL MEDICINE

## 2019-08-27 PROCEDURE — A9270 NON-COVERED ITEM OR SERVICE: HCPCS | Performed by: FAMILY MEDICINE

## 2019-08-27 PROCEDURE — 700102 HCHG RX REV CODE 250 W/ 637 OVERRIDE(OP): Performed by: FAMILY MEDICINE

## 2019-08-27 PROCEDURE — 85025 COMPLETE CBC W/AUTO DIFF WBC: CPT

## 2019-08-27 PROCEDURE — 700102 HCHG RX REV CODE 250 W/ 637 OVERRIDE(OP): Performed by: STUDENT IN AN ORGANIZED HEALTH CARE EDUCATION/TRAINING PROGRAM

## 2019-08-27 PROCEDURE — 700111 HCHG RX REV CODE 636 W/ 250 OVERRIDE (IP): Performed by: STUDENT IN AN ORGANIZED HEALTH CARE EDUCATION/TRAINING PROGRAM

## 2019-08-27 PROCEDURE — 700111 HCHG RX REV CODE 636 W/ 250 OVERRIDE (IP): Performed by: FAMILY MEDICINE

## 2019-08-27 PROCEDURE — 97161 PT EVAL LOW COMPLEX 20 MIN: CPT

## 2019-08-27 PROCEDURE — 97165 OT EVAL LOW COMPLEX 30 MIN: CPT

## 2019-08-27 PROCEDURE — 700105 HCHG RX REV CODE 258: Performed by: STUDENT IN AN ORGANIZED HEALTH CARE EDUCATION/TRAINING PROGRAM

## 2019-08-27 PROCEDURE — 82962 GLUCOSE BLOOD TEST: CPT

## 2019-08-27 RX ADMIN — ENOXAPARIN SODIUM 40 MG: 100 INJECTION SUBCUTANEOUS at 04:26

## 2019-08-27 RX ADMIN — ASPIRIN 81 MG: 81 TABLET, COATED ORAL at 04:26

## 2019-08-27 RX ADMIN — DAPTOMYCIN 600 MG: 500 INJECTION, POWDER, LYOPHILIZED, FOR SOLUTION INTRAVENOUS at 13:32

## 2019-08-27 RX ADMIN — SODIUM HYPOCHLORITE 5 ML: 1.25 SOLUTION TOPICAL at 04:26

## 2019-08-27 RX ADMIN — INSULIN GLARGINE 10 UNITS: 100 INJECTION, SOLUTION SUBCUTANEOUS at 04:27

## 2019-08-27 ASSESSMENT — ENCOUNTER SYMPTOMS
FEVER: 0
NAUSEA: 0
VOMITING: 0

## 2019-08-27 ASSESSMENT — COGNITIVE AND FUNCTIONAL STATUS - GENERAL
MOBILITY SCORE: 23
CLIMB 3 TO 5 STEPS WITH RAILING: A LITTLE
SUGGESTED CMS G CODE MODIFIER DAILY ACTIVITY: CH
SUGGESTED CMS G CODE MODIFIER MOBILITY: CI
DAILY ACTIVITIY SCORE: 24

## 2019-08-27 ASSESSMENT — ACTIVITIES OF DAILY LIVING (ADL): TOILETING: INDEPENDENT

## 2019-08-27 ASSESSMENT — GAIT ASSESSMENTS
DISTANCE (FEET): 100
GAIT LEVEL OF ASSIST: SUPERVISED
ASSISTIVE DEVICE: FRONT WHEEL WALKER

## 2019-08-27 NOTE — THERAPY
"Physical Therapy Evaluation completed.   Bed Mobility: Found up in and returned to chair; pt reporting no difficulty    Transfers: Modified Independent   Gait: Modified Independent  with Front-Wheel Walker       Plan of Care: Patient with no further skilled PT needs in the acute care setting at this time  Discharge Recommendations: Equipment: No Equipment Needed.    Pt admitted for workup of L foot osteo and presents very near or at his functional baseline. As there are no orders regarding official weightbearing; pt has been educated on what NWB is and was able to demo during gait over x140' with FWW. Pt and spouse asking about appropriate shoes for pt. PT chose post-op shoe option with wide dorsal vamp and velcro to minimize direct pressure over wound bed and to continue to allow for general protection from environment via footwear should pt be allowed to bear weight. At this time, pt does not require further acute PT intervention and appears functionally capable of return home.     See \"Rehab Therapy-Acute\" Patient Summary Report for complete documentation.     "

## 2019-08-27 NOTE — DISCHARGE PLANNING
Case Management Note:  Pt was taught how to self administer Abx by [facility] care. Per Lamar medication was authorized. Spoke with Leti Hi at 1415, pt ok to DC.

## 2019-08-27 NOTE — PROGRESS NOTES
Patient discharged to home with relative. PICC in place for long term abx. Discharge instructions given and all questions answered. All belongings with patient.

## 2019-08-27 NOTE — DISCHARGE PLANNING
Agency/Facility Name: [facility]  Spoke To: Lamar  Outcome: Teaching scheduled for 1200.     RN GHADA Alvarez notified.     Agency/Facility Name: [facility]  Spoke To: Nikole  Outcome: Patient accepted.

## 2019-08-27 NOTE — DISCHARGE INSTRUCTIONS
Discharge Instructions    Discharged to home by car with relative. Discharged via wheelchair, hospital escort: Yes.  Special equipment needed: Not Applicable    Be sure to schedule a follow-up appointment with your primary care doctor or any specialists as instructed.     Discharge Plan:   Diet Plan: Discussed  Activity Level: Discussed  Confirmed Follow up Appointment: Appointment Scheduled  Confirmed Symptoms Management: Discussed  Medication Reconciliation Updated: Yes  Influenza Vaccine Indication: Indicated: Not available from distributor/    I understand that a diet low in cholesterol, fat, and sodium is recommended for good health. Unless I have been given specific instructions below for another diet, I accept this instruction as my diet prescription.   Other diet: regular    Bone and Joint Infections, Adult  Introduction  Bone infections (osteomyelitis) and joint infections (septic arthritis) occur when bacteria or other germs get inside a bone or a joint. This can happen if you have an infection in another part of your body that spreads through your blood. Germs from your skin or from outside of your body can also cause this type of infection if you have a wound or a broken bone (fracture) that breaks the skin.  Anyone can get a bone infection or joint infection. You may be more likely to get this type of infection if you have a condition, such as diabetes, that lowers your ability to fight infection or increases your chances of getting an infection. Bone and joint infections can cause damage, and they can spread to other areas of your body. They need to be treated quickly.  What are the causes?  Most bone and joint infections are caused by bacteria. They can also be caused by other germs, such as viruses and funguses.  What increases the risk?  This condition is more likely to develop in:  · People who recently had surgery, especially bone or joint surgery.  · People who have a long-term  (chronic) disease, such as:  ¨ HIV (human immunodeficiency virus).  ¨ Diabetes.  ¨ Rheumatoid arthritis.  ¨ Sickle cell anemia.  · Elderly people.  · People who take medicines that block or weaken the body’s defense system (immune system).  · People who have a condition that reduces their blood flow.  · People who are on kidney dialysis.  · People who have an artificial joint.  · People who have had a joint or bone repaired with plates or screws (surgical hardware).  · People who use or abuse IV drugs.  · People who have had trauma, such as stepping on a nail.  What are the signs or symptoms?  Symptoms vary depending on the type and location of your infection. Common symptoms of bone and joint infections include:  · Fever and chills.  · Redness and warmth.  · Swelling.  · Pain and stiffness.  · Drainage of fluid or pus near the infection.  · Weight loss and fatigue.  · Decreased ability to use a hand or foot.  How is this diagnosed?  This condition may be diagnosed based on symptoms, medical history, a physical exam, and diagnostic tests. Tests can help to identify the cause of the infection. You may have various tests, such as:  · A sample of tissue, fluid, or blood taken to be examined under a microscope.  · A procedure to remove fluid from the infected joint with a needle (joint aspiration) for testing in a lab.  · Pus or discharge swabbed from a wound for testing to identify germs and to determine what type of medicine will kill them (culture and sensitivity).  · Blood tests to look for evidence of infection and inflammation (biomarkers).  · Imaging studies to determine how severe the bone or joint infection is. These may include:  ¨ X-rays.  ¨ CT scan.  ¨ MRI.  ¨ Bone scan.  How is this treated?  Treatment depends on the cause and type of infection. Antibiotic medicines are usually the first treatment for a bone or joint infection. Treatment with antibiotics may include:  · Getting IV antibiotics. This may be  done in a hospital at first. You may have to continue IV antibiotics at home for several weeks. You may also have to take antibiotics by mouth for several weeks after that.  · Taking more than one kind of antibiotic. Treatment may start with a type of antibiotic that works against many different bacteria (broad spectrum antibiotics). IV antibiotics may be changed if tests show that another type may work better.  Other treatments may include:  · Draining fluid from the joint by placing a needle into it (aspiration).  · Surgery to remove:  ¨ Dead or dying tissue from a bone or joint.  ¨ An infected artificial joint.  ¨ Infected plates or screws that were used to repair a broken bone.  Follow these instructions at home:  · Take medicines only as directed by your health care provider.  · Take your antibiotic medicine as directed by your health care provider. Finish the antibiotic even if you start to feel better.  · Follow instructions from your health care provider about how to take IV antibiotics at home.  · Ask your health care provider if you have any restrictions on your activities.  · Keep all follow-up visits as directed by your health care provider. This is important.  Contact a health care provider if:  · You have a fever or chills.  · You have redness, warmth, pain, or swelling that returns after treatment.  Get help right away if:  · You have rapid breathing or you have trouble breathing.  · You have chest pain.  · You cannot drink fluids or make urine.  · The affected arm or leg swells, changes color, or turns blue.  This information is not intended to replace advice given to you by your health care provider. Make sure you discuss any questions you have with your health care provider.  Document Released: 12/18/2006 Document Revised: 05/25/2017 Document Reviewed: 12/16/2015  © 2017 Elsevier      Depression / Suicide Risk    As you are discharged from this [facility] facility, it is important to learn how  to keep safe from harming yourself.    Recognize the warning signs:  · Abrupt changes in personality, positive or negative- including increase in energy   · Giving away possessions  · Change in eating patterns- significant weight changes-  positive or negative  · Change in sleeping patterns- unable to sleep or sleeping all the time   · Unwillingness or inability to communicate  · Depression  · Unusual sadness, discouragement and loneliness  · Talk of wanting to die  · Neglect of personal appearance   · Rebelliousness- reckless behavior  · Withdrawal from people/activities they love  · Confusion- inability to concentrate     If you or a loved one observes any of these behaviors or has concerns about self-harm, here's what you can do:  · Talk about it- your feelings and reasons for harming yourself  · Remove any means that you might use to hurt yourself (examples: pills, rope, extension cords, firearm)  · Get professional help from the community (Mental Health, Substance Abuse, psychological counseling)  · Do not be alone:Call your Safe Contact- someone whom you trust who will be there for you.  · Call your local CRISIS HOTLINE 371-5046 or 495-131-9195  · Call your local Children's Mobile Crisis Response Team [place] (555) 655-6362 or www.[employer]  · Call the toll free National Suicide Prevention Hotlines   · National Suicide Prevention Lifeline 926-711-NKVD (1281)  · National Hope Line Network 800-SUICIDE (366-3076)

## 2019-08-27 NOTE — THERAPY
"Occupational Therapy Evaluation completed.   Functional Status: Mod I for ADLs and ADL transfers  Plan of Care: Patient with no further skilled OT needs in the acute care setting at this time  Discharge Recommendations:  Equipment: Shower Chair. Post-acute therapy Anticipate that the patient will have no further occupational therapy needs after discharge from the hospital.     See \"Rehab Therapy-Acute\" Patient Summary Report for complete documentation.    Pt is 54yo male with L diabetic foot infection/osteomyelitis, medical team recommending amputation but pt refusing. Pt has no official weight bearing orders, spouse reported \"a doctor told us he shouldn't walk on it\"  therefore pt educated on strategies to complete ADLs and ADL txfs with FWW and NWB L LE, pt able to demonstrate. RN aware of pt and family's request for clarification on WB status. Pt presents to OT eval without functional deficits in ADLs or ADL transfers. Pt and spouse educated about soft diabetic socks and shoes to limit pressure and friction on the wound, however will defer to LPS for further recommendations on wound protection. No additional OT needs at this time.    "

## 2019-08-27 NOTE — PROGRESS NOTES
Infectious Disease Progress Note    Author: Angie Gonzalez M.D. Date & Time of service: 2019  1:44 PM    Chief Complaint:  Follow-up for left diabetic foot infection/osteomyelitis    Interval History:  53 y.o. diabetic male admitted 2019 due to nonhealing wounds left foot  2019 no fevers.  W BC 6.7 foot wound is showing staph aureus   afebrile, WBC 5.8 ongoing left foot pain.  Patient continues to refuse amputation and wants to continue aggressive antibiotic therapy.  Wound culture+ MSSA.  Plan of care with PICC line and 6 weeks of IV antibiotics discussed with patient and family at bedside   afebrile WBC 6 no new clinical issues overnight.  Plan for PICC line placement today.  Patient would like to do home IV antibiotics.   AF got PICC this am-refused yesterday-has continued to refused debridement. Review of the ound shows ischemic ulcerations and necrotic eschar   AF up to chair-questions about foot wear-denies SE abx. Pain controlled in foot    Labs Reviewed.Wound reviewed    Review of Systems:  Review of Systems   Constitutional: Negative for fever.   Gastrointestinal: Negative for nausea and vomiting.   Musculoskeletal: Positive for joint pain.   All other systems reviewed and are negative.      Hemodynamics:  Temp (24hrs), Av.4 °C (97.5 °F), Min:36.1 °C (97 °F), Max:36.7 °C (98.1 °F)  Temperature: 36.4 °C (97.5 °F)  Pulse  Av.2  Min: 73  Max: 108   Blood Pressure: 120/77       Physical Exam:  Physical Exam   Constitutional: He is oriented to person, place, and time. No distress.   HENT:   Mouth/Throat: No oropharyngeal exudate.   Eyes: Pupils are equal, round, and reactive to light. EOM are normal. No scleral icterus.   Neck: No JVD present.   Cardiovascular: Normal rate.   No murmur heard.  Pulmonary/Chest: Effort normal. No stridor. No respiratory distress.   Abdominal: Soft. He exhibits no distension. There is no tenderness.   Musculoskeletal: He exhibits edema.    Left foot : ischemic eschars multiple digits  Wound on the dorsomedial surface with bone visible-necrotic eschar  LUE PICC-nontender   Neurological: He is alert and oriented to person, place, and time. No cranial nerve deficit.   Skin: Skin is warm. No rash noted. He is not diaphoretic.   Nursing note and vitals reviewed.      Meds:    Current Facility-Administered Medications:   •  DAPTOmycin IVPB ([facility] only)  •  insulin glargine  •  insulin glargine  •  dakins 0.125% (1/4 strength)  •  senna-docusate **AND** polyethylene glycol/lytes **AND** magnesium hydroxide **AND** bisacodyl  •  enoxaparin  •  enalaprilat  •  ondansetron  •  ondansetron  •  promethazine  •  promethazine  •  prochlorperazine  •  acetaminophen  •  insulin regular **AND** Accu-Chek ACHS **AND** NOTIFY MD and PharmD **AND** glucose **AND** dextrose 10% bolus  •  oxyCODONE immediate-release  •  aspirin    Labs:  Recent Labs     08/25/19  0337 08/26/19  0400 08/27/19  0430   WBC 6.0 5.7 6.1   RBC 3.84* 3.95* 4.17*   HEMOGLOBIN 11.1* 11.3* 11.7*   HEMATOCRIT 35.1* 36.2* 38.0*   MCV 91.4 91.6 91.1   MCH 28.9 28.6 28.1   RDW 45.7 45.8 45.1   PLATELETCT 237 229 246   MPV 10.1 10.1 9.6   NEUTSPOLYS 53.00 50.90 55.10   LYMPHOCYTES 35.50 36.70 36.00   MONOCYTES 9.50 9.90 6.90   EOSINOPHILS 1.30 1.80 1.30   BASOPHILS 0.50 0.50 0.50     Recent Labs     08/26/19  2200   CPKTOTAL 28     No results for input(s): ALBUMIN, TBILIRUBIN, ALKPHOSPHAT, TOTPROTEIN, ALTSGPT, ASTSGOT, CREATININE in the last 72 hours.    Imaging:  Dx-foot-complete 3+ Left    Result Date: 8/21/2019 8/21/2019 11:55 AM HISTORY/REASON FOR EXAM:  Atraumatic Pain/Swelling/Deformity; ? osteomyelitis Swelling, ulcer, diabetes TECHNIQUE/EXAM DESCRIPTION AND NUMBER OF VIEWS: 3 views of the LEFT foot. COMPARISON:  8/16/2019 FINDINGS: Redemonstrated is soft tissue defect along the medial aspect of the foot at the first MTP joint. There is abnormal lucency involving the first metatarsal  head and the proximal phalangeal base which is suspicious for osteomyelitis and possibly septic arthritis. There is significant joint space narrowing of the MTP joint. No acute fracture or dislocation is seen. Alignment is anatomic. Small Achilles and plantar calcaneal spurs. There are atherosclerotic arterial calcifications.     1.  Soft tissue swelling and defect about the great toe MTP joint. 2.  Abnormal lucency is again noted about the first MTP joint suggestive of osteomyelitis/infection. Further evaluation with MRI without and with contrast is suggested.    Dx-foot-complete 3+ Left    Result Date: 8/16/2019 8/16/2019 4:11 PM HISTORY/REASON FOR EXAM:  Rule out osteomyelitis TECHNIQUE/EXAM DESCRIPTION AND NUMBER OF VIEWS: 3 views of the LEFT foot. COMPARISON:  5/17/2019. FINDINGS:  There is indistinctness of the cortical surfaces first metatarsal-phalangeal joint which are suggestive of osteomyelitis. Bony alignment is anatomic. There is a large soft tissue defect over the dorsal and medial surface of the first metatarsophalangeal joint. No acute fracture or dislocation is identified.     1.  Indistinctness of the cortical surfaces first metatarsal-phalangeal joint which are suggestive of osteomyelitis. Consider MRI with and without contrast for further evaluation. 2.  Large soft tissue defect of the dorsal and medial surface of the first metatarsophalangeal joint.    Mr-foot-with & W/o Left    Result Date: 8/22/2019 8/21/2019 9:44 PM HISTORY/REASON FOR EXAM:  Diabetes with soft tissue infection TECHNIQUE/EXAM DESCRIPTION: MRI of the LEFT foot with and without contrast. Using a [facility] Signa 1.5 Luisa MRI scanner, T1 axial, sagittal, and coronal, fast spin-echo T2 fat-suppressed axial and coronal, fast inversion recovery sagittal, and T1 fat suppressed axial and sagittal post intravenous contrast images were obtained. A total of 15 mL ProHance given. COMPARISON:  Radiographs 8/21/2019 FINDINGS: The distal aspect  of the phalanges are not well seen due to some artifact, inhomogeneous fat saturation First metatarsal-phalangeal centered increased T2, decreased T1 medullary space signals identified. The entire first metatarsus is affected as is the proximal phalanx. There is an metatarsal-phalangeal effusion. There is also slight edema and enhancement  in the medial cuneiform. No effusion. The first metatarsal head sesamoids are edematous and there is bony destruction posteriorly in the lateral sesamoid. There is also bony destruction involving the lateral aspect of the first metatarsal-phalangeal joint. Adjacent skin ulceration is seen medial to the metatarsal-phalangeal joint. No soft tissue abscess is detected. There is a moderate forefoot skin thickening and soft tissue enhancement indicating cellulitis and phlegmonous change. No other bony destruction is seen but there is some edema in the adjacent second metatarsal head. This enhances coronal image 34. There is slight associated decreased T1 signal precontrast. No joint effusion is confirmed. No ligament or tendon tear is confirmed. No retraction noted. There is plantar fat pad decreased T1, mildly increased T2 and enhanced T1 signal overlying the calcaneus suggesting post contusion related change. Plantar fascia is moderately thickened and there is a calcaneus spur.     1.  First metatarsal-phalangeal joint septic arthropathy 2.  Osteomyelitis affecting the entire first metatarsal, proximal phalanx and sesamoids. Edema and enhancement in the adjacent medial cuneiform could be reactive or secondary to early septic arthropathy and osteomyelitis centered at the first TMT joint 3.  Suspicious for early second medial metatarsal head osteomyelitis. Septic arthropathy cannot be excluded 4.  Medial first metatarsal-phalangeal centered greater than dorsal medial forefoot skin ulceration with cellulitis and phlegmonous change. No abscess identified 5.  Second metatarsal shaft edema  is more likely from stress response than infection 6.  Plantar fasciitis    Us-simone Single Level Bilat    Result Date: 2019   Vascular Laboratory  Conclusions  Monophasic  waveforms with low amplitude, right greater than left.  Decreased SIMONE on the right. Findings are suggestive of significant arterial  disease  TRELL CASTLE  Age:    53    Gender:     M  MRN:    [id number]  :    1965      BSA:  Exam Date:     2019 16:01  Room #:     Inpatient  Priority:     Routine  Ht (in):             Wt (lb):  Ordering Physician:        JENNIFER CLEANING  Referring Physician:  Sonographer:               Nettie Guzman RDCS, RVT  Study Type:                Complete Bilateral  Technical Quality:         Adequate  Indications:     Ulceration of LE  CPT Codes:       55619  ICD Codes:       707.1  History:         Ulcerations  Limitations:                 RIGHT  Waveform            Systolic BPs (mmHg)                             137           Brachial                                           Common Femoral  Monophasic                 97            Posterior Tibial  Monophasic                 101           Dorsalis Pedis                                           Peroneal                             0.74          SIMONE                                           TBI                       LEFT  Waveform        Systolic BPs (mmHg)                             132           Brachial                                           Common Femoral  Monophasic                 152           Posterior Tibial  Monophasic                 168           Dorsalis Pedis                                           Peroneal                             1.23          SIMONE                                           TBI  Findings  Right.  Doppler waveforms at the ankle are monophasic with low  amplitude.  Ankle-brachial index is moderately reduced.  Left.  Doppler waveforms at the ankle are monophasic with  low  amplitude.  Ankle-brachial index is falsly normal.  Mitzi Diggs  (Electronically Signed)  Final Date:      2019                   16:44    Us-extremity Artery Lower Bilat    Result Date: 2019  Lower Extremity  Arterial Duplex Report  Vascular Laboratory  CONCLUSIONS  Diffuse, laminar lower extremity arterial plaquing and calcium noted.  Bilaterally dampened distal waveforms suggesive of distal aterial disease  as detailed.  No noted focal arterial stenosis.  Compared with the prior study of May - it appears revascularization has  occurred in the interim.  TRELL CASTLE  Exam Date:     2019 17:58  Room #:     Inpatient  Priority:     Routine  Ht (in):             Wt (lb):  Ordering Physician:        JENNIFER CLEANING  Referring Physician:  Sonographer:               Chas Jo RVT, RDMS  Study Type:                Complete Bilateral  Technical Quality:         Adequate  Age:    53    Gender:     M  MRN:    [id number]  :    1965      BSA:  Indications:     PVD  CPT Codes:       86117  ICD Codes:       443.9  History:         Prior duplex on 19. Non-healing ulcerations on both                   feet.  Limitations:     Dropout due to calcification of vessels.                RIGHT  Waveform        Peak Systolic Velocity (cm/s)                  Prox    Prox-Mid  Mid    Mid-Dist  Distal  Triphasic                         91                       CFA  Triphasic       92                                         PFA  Triphasic       70                89               58      SFA  Triphasic                         54                       POP  Monophasic      30                                 16      AT  Monophasic      57                                 55      PT  Monophasic      104                                28      KAYKAY                LEFT  Waveform        Peak Systolic Velocity (cm/s)                  Prox    Prox-Mid  Mid    Mid-Dist  Distal   "Triphasic                         68                       CFA  Triphasic       69                                         PFA  Triphasic       72                84               63      SFA  Biphasic                          92                       POP  Monophasic      68                                 53      AT  Monophasic      37                                 41      PT  Monophasic      118                                10      KAYKAY  FINDINGS  Right.  Mild plaque and calcifications throughout the extremity.  Triphasic waveforms and normal velocities seen from the common femoral to  the popliteal artery.  Possible ulceration of distal femoral artery vs. artifact.  Monophasic waveforms in the posterior tibial, anterior tibial and peroneal  arteries.  Left.  Mild plaque and calcifications throughout the extremity.  Triphasic waveforms with normal velocities seen from the common femoral to  the popliteal artery.  Monophasic waveforms found in the posterior tibial, anterior tibial, and  peroneal arteries.  Severely dampened waveform in the distal peroneal artery.  Shanda Tobias M.D.  (Electronically Signed)  Final Date:      22 August 2019                   20:37      Micro:  Results     Procedure Component Value Units Date/Time    BLOOD CULTURE [[id number]] Collected:  08/22/19 1203    Order Status:  Completed Specimen:  Blood from Peripheral Updated:  08/27/19 1300     Significant Indicator NEG     Source BLD     Site PERIPHERAL     Culture Result No growth after 5 days of incubation.    Narrative:       Per Hospital Policy: Only change Specimen Src: to \"Line\" if  specified by physician order.  Left AC    BLOOD CULTURE [[id number]] Collected:  08/22/19 1203    Order Status:  Completed Specimen:  Blood from Peripheral Updated:  08/27/19 1300     Significant Indicator NEG     Source BLD     Site PERIPHERAL     Culture Result No growth after 5 days of incubation.    Narrative:       Per Hospital Policy: Only " "change Specimen Src: to \"Line\" if  specified by physician order.  Left Forearm/Arm    CULTURE WOUND W/ GRAM STAIN [[id number]]  (Abnormal)  (Susceptibility) Collected:  08/22/19 0617    Order Status:  Completed Specimen:  Wound from Left Foot Updated:  08/24/19 1350     Significant Indicator POS     Source WND     Site LEFT FOOT     Culture Result Light growth mixed skin ester.     Gram Stain Result Few WBCs.  Rare Gram positive cocci.       Culture Result Staphylococcus aureus  Moderate growth  This isolate is presumed to be clindamycin resistant based on  detection of inducible resistance.  Clindamycin may still  be effective in some patients.        Enterococcus faecalis  Rare growth  Combination therapy with ampicillin, penicillin, or  vancomycin (for susceptible strains) plus an aminoglycoside  is usually indicated for serious enterococcal infections,  such as endocarditis unless high-level resistance to both  gentamicin and streptomycin is documented; such combinations  are predicted to result in synergistic killing of the  Enterococcus.      Susceptibility     Staphylococcus aureus (1)     Antibiotic Interpretation Microscan Method Status    Clindamycin Resistant <=0.5 mcg/mL RAMIRO Final    Daptomycin Sensitive <=0.5 mcg/mL RAMIRO Final    Erythromycin Resistant >4 mcg/mL RAMIRO Final    Moxifloxacin Sensitive <=0.5 mcg/mL RAMIRO Final    Oxacillin Sensitive <=0.25 mcg/mL RAMIRO Final    Ampicillin/sulbactam Sensitive <=8/4 mcg/mL RAMIRO Final    Penicillin Resistant >8 mcg/mL RAMIRO Final    Trimeth/Sulfa Sensitive <=0.5/9.5 mcg/mL RAMIRO Final    Tetracycline Sensitive <=4 mcg/mL RAMIRO Final    Vancomycin Sensitive 1 mcg/mL RAMIRO Final          Enterococcus faecalis (2)     Antibiotic Interpretation Microscan Method Status    Daptomycin Sensitive 2 mcg/mL RAMIRO Final    Penicillin Sensitive 2 mcg/mL RAMIRO Final    Gent Synergy Sensitive <=500 mcg/mL RAMIRO Final    Vancomycin Sensitive 2 mcg/mL RAMIRO Final    Ampicillin Sensitive <=2 mcg/mL " RAMIRO Final                   GRAM STAIN [[id number]] Collected:  08/22/19 0617    Order Status:  Completed Specimen:  Wound Updated:  08/22/19 1215     Significant Indicator .     Source WND     Site LEFT FOOT     Gram Stain Result Few WBCs.  Rare Gram positive cocci.            Assessment:  Active Hospital Problems    Diagnosis   • *Osteomyelitis of left foot ([facility]) [M86.9]   • Peripheral arterial disease ([facility]) [I73.9]   • Normocytic anemia [D64.9]   • Diabetic foot ([facility]) [E11.8]   • Uncontrolled type 2 diabetes mellitus with hyperglycemia ([facility]) [E11.65]       Plan:  Left diabetic foot infection with underlying osteomyelitis, not healing  Afebrile  No leukocytosis  Cultures + MSSA, ampicillin sensitive Efaecalis  Recommend amp as wounds unlikely to heal-Patient refusing   Continue daptomycin 8 mg/KG IV daily  Anticipate 6 weeks of IV antibiotics total-advised abx will likely fail  Ultimately may require TMA versus BKA   Wound care  Abx orders done-stop date 10/2/2019  Hold statin while on dapto    Peripheral vascular disease  s/p revascularization-unclear if will help wound healing  Anticipated poor wound healing    Diabetes mellitus, not controlled  Glycosylated hemoglobin 7.7  Keep BS under 150 to help control current infection  -259    I have performed a physical exam and reviewed and updated ROS and plan today 8/27/2019.  In review of yesterday's note 8/26/2019, there are no changes except as documented above.    Prognosis for limb salvage poor

## 2019-08-29 LAB — ISLET CELL512 AB SER IA-ACNC: <5.4 U/ML (ref 0–7.4)

## 2019-09-03 ENCOUNTER — HOSPITAL ENCOUNTER (EMERGENCY)
Facility: MEDICAL CENTER | Age: 54
End: 2019-09-03
Attending: EMERGENCY MEDICINE
Payer: COMMERCIAL

## 2019-09-03 VITALS
WEIGHT: 165 LBS | DIASTOLIC BLOOD PRESSURE: 70 MMHG | OXYGEN SATURATION: 98 % | BODY MASS INDEX: 22.38 KG/M2 | HEART RATE: 91 BPM | SYSTOLIC BLOOD PRESSURE: 127 MMHG | TEMPERATURE: 97.2 F | RESPIRATION RATE: 18 BRPM

## 2019-09-03 DIAGNOSIS — R55 VASOVAGAL SYNCOPE: ICD-10-CM

## 2019-09-03 DIAGNOSIS — R53.1 WEAKNESS: ICD-10-CM

## 2019-09-03 LAB
ANION GAP SERPL CALC-SCNC: 10 MMOL/L (ref 0–11.9)
BASOPHILS # BLD AUTO: 0.3 % (ref 0–1.8)
BASOPHILS # BLD: 0.02 K/UL (ref 0–0.12)
BUN SERPL-MCNC: 17 MG/DL (ref 8–22)
CALCIUM SERPL-MCNC: 9 MG/DL (ref 8.5–10.5)
CHLORIDE SERPL-SCNC: 103 MMOL/L (ref 96–112)
CO2 SERPL-SCNC: 23 MMOL/L (ref 20–33)
CREAT SERPL-MCNC: 0.61 MG/DL (ref 0.5–1.4)
EOSINOPHIL # BLD AUTO: 0.03 K/UL (ref 0–0.51)
EOSINOPHIL NFR BLD: 0.4 % (ref 0–6.9)
ERYTHROCYTE [DISTWIDTH] IN BLOOD BY AUTOMATED COUNT: 45.5 FL (ref 35.9–50)
GLUCOSE SERPL-MCNC: 197 MG/DL (ref 65–99)
HCT VFR BLD AUTO: 34.4 % (ref 42–52)
HGB BLD-MCNC: 11.1 G/DL (ref 14–18)
IMM GRANULOCYTES # BLD AUTO: 0.02 K/UL (ref 0–0.11)
IMM GRANULOCYTES NFR BLD AUTO: 0.3 % (ref 0–0.9)
LYMPHOCYTES # BLD AUTO: 0.95 K/UL (ref 1–4.8)
LYMPHOCYTES NFR BLD: 14.1 % (ref 22–41)
MCH RBC QN AUTO: 29 PG (ref 27–33)
MCHC RBC AUTO-ENTMCNC: 32.3 G/DL (ref 33.7–35.3)
MCV RBC AUTO: 89.8 FL (ref 81.4–97.8)
MONOCYTES # BLD AUTO: 0.38 K/UL (ref 0–0.85)
MONOCYTES NFR BLD AUTO: 5.7 % (ref 0–13.4)
NEUTROPHILS # BLD AUTO: 5.32 K/UL (ref 1.82–7.42)
NEUTROPHILS NFR BLD: 79.2 % (ref 44–72)
NRBC # BLD AUTO: 0 K/UL
NRBC BLD-RTO: 0 /100 WBC
PLATELET # BLD AUTO: 167 K/UL (ref 164–446)
PMV BLD AUTO: 11.6 FL (ref 9–12.9)
POTASSIUM SERPL-SCNC: 4.2 MMOL/L (ref 3.6–5.5)
RBC # BLD AUTO: 3.83 M/UL (ref 4.7–6.1)
SODIUM SERPL-SCNC: 136 MMOL/L (ref 135–145)
WBC # BLD AUTO: 6.7 K/UL (ref 4.8–10.8)

## 2019-09-03 PROCEDURE — 85025 COMPLETE CBC W/AUTO DIFF WBC: CPT

## 2019-09-03 PROCEDURE — 99284 EMERGENCY DEPT VISIT MOD MDM: CPT

## 2019-09-03 PROCEDURE — 700105 HCHG RX REV CODE 258: Performed by: EMERGENCY MEDICINE

## 2019-09-03 PROCEDURE — 80048 BASIC METABOLIC PNL TOTAL CA: CPT

## 2019-09-03 RX ORDER — SODIUM CHLORIDE 9 MG/ML
1000 INJECTION, SOLUTION INTRAVENOUS ONCE
Status: COMPLETED | OUTPATIENT
Start: 2019-09-03 | End: 2019-09-03

## 2019-09-03 RX ADMIN — SODIUM CHLORIDE 1000 ML: 9 INJECTION, SOLUTION INTRAVENOUS at 12:20

## 2019-09-03 NOTE — ED NOTES
Pt concerned that abx (Daptomycin) is due to be given at 1300 today. Pt states he does IV infusion every 24 hours. MD Gupta notified.

## 2019-09-03 NOTE — ED PROVIDER NOTES
ED Provider  Scribed for Vadim Gupta D.O. by Kem San. 9/3/2019  12:05 PM    Means of arrival: EMS   History obtained from: patient  History limited by: none    CHIEF COMPLAINT  Chief Complaint   Patient presents with   • Fatigue   • Weakness   • Wound Infection       HPI  Israel Aviles is a 53 y.o. male with a PMHx of diabetes who presents today with concerns for fatigue and weakness. Patient notes that he had an appointment with his home health nurse today who came to change his dressing and take blood tests. Directly after the blood was drawn from his PICC line, the patient started feeling weak and fatigued. Patient denies any syncope. He adds he has had the PICC line in place for the past 7 days to receive antibiotics for an ulcer on his left foot. He currently denies any fever, chills, or nausea.    REVIEW OF SYSTEMS  See HPI for further details. All other systems are negative.     PAST MEDICAL HISTORY  Patient has a past medical history of Diabetes (HCC).    SOCIAL HISTORY  Social History     Tobacco Use   • Smoking status: Never Smoker   • Smokeless tobacco: Never Used   Substance and Sexual Activity   • Alcohol use: No   • Drug use: No       SURGICAL HISTORY  Patient has a past surgical history that includes irrigation & debridement ortho (Left, 6/24/2019).    CURRENT MEDICATIONS  Home Medications     Reviewed by Elaina Connors R.N. (Registered Nurse) on 09/03/19 at 1158  Med List Status: Partial   Medication Last Dose Status   aspirin 81 MG EC tablet  Active   atorvastatin (LIPITOR) 40 MG Tab  Active   clopidogrel (PLAVIX) 75 MG Tab  Active   glipiZIDE (GLUCOTROL) 5 MG Tab  Active   metformin (GLUCOPHAGE) 1000 MG tablet  Active                ALLERGIES  No Known Allergies    PHYSICAL EXAM  VITAL SIGNS: /75   Pulse 86   Temp 35.9 °C (96.6 °F) (Temporal)   Resp 16   Wt 74.8 kg (165 lb)   SpO2 100%   BMI 22.38 kg/m²   Constitutional: Alert in no apparent distress.  HENT: No  signs of trauma, mucous membranes are moist  Eyes: Conjunctiva normal, Non-icteric.   Neck: Normal range of motion, No tenderness, Supple.  Lymphatic: No lymphadenopathy noted.   Cardiovascular: Regular rate and rhythm, no murmurs.   Thorax & Lungs: Normal breath sounds, No respiratory distress, No wheezing, No chest tenderness.   Abdomen: Bowel sounds normal, Soft, No tenderness, No masses, No pulsatile masses. No peritoneal signs.  Skin: Pale, Dry.  Back: No bony tenderness, No CVA tenderness.   Extremities: Left picc line in place with no signs of infection. Left foot dressing in place with ulcerations of the toes. No cyanosis  Musculoskeletal: Good range of motion in all major joints. No tenderness to palpation or major deformities noted.   Neurologic: Alert and oriented x4, Normal motor function, Normal sensory function, No focal deficits noted.   Psychiatric: Affect normal, Judgment normal, Mood normal.     MEDICAL DECISION MAKING  This is a 53 y.o. male who presents With a syncopal episode.  The patient has a PICC line, is you getting IV antibiotics for chronic left foot infection.  He is complained of generalized weakness and malaise, no fever.  He had an episode today after he watched the blood being drawn from his PICC line that he passed out.  There is no seizure activity.  He has no injury from the passing out.  His lab tests also with no signs of sepsis, no anemia no significant electrolyte imbalance.  The patient is stable for discharge home to continue his home treatment.  His symptoms sound most consistent with a vasovagal effect.    DIAGNOSTIC STUDIES / PROCEDURES    LABS  Results for orders placed or performed during the hospital encounter of 09/03/19   CBC WITH DIFFERENTIAL   Result Value Ref Range    WBC 6.7 4.8 - 10.8 K/uL    RBC 3.83 (L) 4.70 - 6.10 M/uL    Hemoglobin 11.1 (L) 14.0 - 18.0 g/dL    Hematocrit 34.4 (L) 42.0 - 52.0 %    MCV 89.8 81.4 - 97.8 fL    MCH 29.0 27.0 - 33.0 pg    MCHC  32.3 (L) 33.7 - 35.3 g/dL    RDW 45.5 35.9 - 50.0 fL    Platelet Count 167 164 - 446 K/uL    MPV 11.6 9.0 - 12.9 fL    Neutrophils-Polys 79.20 (H) 44.00 - 72.00 %    Lymphocytes 14.10 (L) 22.00 - 41.00 %    Monocytes 5.70 0.00 - 13.40 %    Eosinophils 0.40 0.00 - 6.90 %    Basophils 0.30 0.00 - 1.80 %    Immature Granulocytes 0.30 0.00 - 0.90 %    Nucleated RBC 0.00 /100 WBC    Neutrophils (Absolute) 5.32 1.82 - 7.42 K/uL    Lymphs (Absolute) 0.95 (L) 1.00 - 4.80 K/uL    Monos (Absolute) 0.38 0.00 - 0.85 K/uL    Eos (Absolute) 0.03 0.00 - 0.51 K/uL    Baso (Absolute) 0.02 0.00 - 0.12 K/uL    Immature Granulocytes (abs) 0.02 0.00 - 0.11 K/uL    NRBC (Absolute) 0.00 K/uL   BASIC METABOLIC PANEL   Result Value Ref Range    Sodium 136 135 - 145 mmol/L    Potassium 4.2 3.6 - 5.5 mmol/L    Chloride 103 96 - 112 mmol/L    Co2 23 20 - 33 mmol/L    Glucose 197 (H) 65 - 99 mg/dL    Bun 17 8 - 22 mg/dL    Creatinine 0.61 0.50 - 1.40 mg/dL    Calcium 9.0 8.5 - 10.5 mg/dL    Anion Gap 10.0 0.0 - 11.9   ESTIMATED GFR   Result Value Ref Range    GFR If African American >60 >60 mL/min/1.73 m 2    GFR If Non African American >60 >60 mL/min/1.73 m 2     All labs reviewed by me.    COURSE  Pertinent Labs & Imaging studies reviewed. (See chart for details)    12:05 PM - Patient seen and examined at bedside. Discussed plan of care. The patient will be resuscitated with 1L NS IV. Ordered for CBC w/ diff and BMP to evaluate his symptoms.     1:00 PM - Reviewed patient's lab results which did not show any acute abnormalities.     1:10 PM - Patient was reevaluated at bedside. Discussed lab results and the plan for discharge with the patient. Patient verbalizes understanding and agreement to this plan of care.          HYDRATION: Based on the patient's presentation of weakness/fatigue the patient was given IV fluids. IV Hydration was used because oral hydration was not adequate alone. Upon recheck following hydration, the patient was  improved.      The patient will return for new or worsening symptoms and is stable at the time of discharge.    The patient is referred to a primary physician for blood pressure management, diabetic screening, and for all other preventative health concerns.    DISPOSITION:  Patient will be discharged home in stable condition.    FOLLOW UP:  Mj Bai M.D.  [id number]  [address]  [place] [id number]  [phone]    In 2 days        FINAL IMPRESSION  1. Weakness    2. Vasovagal syncope         Kem JASSO (Scribe), am scribing for, and in the presence of, Vadim Gupta D.O..    Electronically signed by: Kem San (Ankitibe), 9/3/2019    IVadim D.O. personally performed the services described in this documentation, as scribed by Kem San in my presence, and it is both accurate and complete.  C    The note accurately reflects work and decisions made by me.  Vadim Gupta  9/3/2019  4:02 PM

## 2019-09-03 NOTE — ED NOTES
"Pt brought in by SHAILA from home. Per EMS pt home health nurse was on visit to change dressing to chronic wound on L foot, states RN went to draw blood from PICC line and started to feel fatigued and weak. -syncope. Pt states \"I saw the blood and that made me feel weak.\" pt initially AMA'd for first EMS call but eventually called 911 again d/t continuing feeling tired/weak.     Pt appears pale. . VS WNL.     Pt a/o x4, speaking in full sentences.   "

## 2019-09-09 ENCOUNTER — OFFICE VISIT (OUTPATIENT)
Dept: INFECTIOUS DISEASES | Facility: MEDICAL CENTER | Age: 54
End: 2019-09-09
Payer: COMMERCIAL

## 2019-09-09 VITALS
SYSTOLIC BLOOD PRESSURE: 120 MMHG | WEIGHT: 168 LBS | BODY MASS INDEX: 22.75 KG/M2 | TEMPERATURE: 97.7 F | HEART RATE: 89 BPM | DIASTOLIC BLOOD PRESSURE: 70 MMHG | HEIGHT: 72 IN | OXYGEN SATURATION: 94 %

## 2019-09-09 DIAGNOSIS — L97.523 DIABETIC ULCER OF TOE OF LEFT FOOT ASSOCIATED WITH TYPE 2 DIABETES MELLITUS, WITH NECROSIS OF MUSCLE (HCC): ICD-10-CM

## 2019-09-09 DIAGNOSIS — M86.672 OTHER CHRONIC OSTEOMYELITIS OF LEFT FOOT (HCC): ICD-10-CM

## 2019-09-09 DIAGNOSIS — E11.42 DIABETIC POLYNEUROPATHY ASSOCIATED WITH TYPE 2 DIABETES MELLITUS (HCC): ICD-10-CM

## 2019-09-09 DIAGNOSIS — I73.9 PERIPHERAL ARTERIAL DISEASE (HCC): ICD-10-CM

## 2019-09-09 DIAGNOSIS — I63.9 ACUTE ISCHEMIC STROKE (HCC): ICD-10-CM

## 2019-09-09 DIAGNOSIS — E11.8 DIABETIC FOOT (HCC): ICD-10-CM

## 2019-09-09 DIAGNOSIS — E11.65 UNCONTROLLED TYPE 2 DIABETES MELLITUS WITH HYPERGLYCEMIA (HCC): ICD-10-CM

## 2019-09-09 DIAGNOSIS — E11.621 DIABETIC ULCER OF TOE OF LEFT FOOT ASSOCIATED WITH TYPE 2 DIABETES MELLITUS, WITH NECROSIS OF MUSCLE (HCC): ICD-10-CM

## 2019-09-09 PROCEDURE — 99215 OFFICE O/P EST HI 40 MIN: CPT | Performed by: INTERNAL MEDICINE

## 2019-09-09 RX ORDER — DAPTOMYCIN 50 MG/ML
INJECTION, POWDER, LYOPHILIZED, FOR SOLUTION INTRAVENOUS
COMMUNITY
End: 2019-12-16

## 2019-09-09 ASSESSMENT — ENCOUNTER SYMPTOMS
FEVER: 0
CHILLS: 0

## 2019-09-09 NOTE — PROGRESS NOTES
[facility] INFECTIOUS DISEASES CLINIC NOTE     Date of Service: 9/9/2019    Referring Physician: Mj Bai MD    Chief Complaint: hospital follow up     History of Present Illness:     Israel Aviles is a 53 y.o. male is here for a hospital follow up visit.     Pt is diagnosed with non-healing left diabetic foot infection with underlying osteomyelitis. Seen by Dr. Gonzalez on 8/27. Pt refuses amputation. Cx positive for MSSA and amp sensitive E faecalis. MRI c/w osteomylitis in the entire first digit. He's being treated with daptomycin 8mg/kg daily and anticipated 6 weeks of treatment. (Stop date 10/2)   LUE PICC line at discharge  Last CK 26  Last A1C 7.7 in 8/22 (previously was 8.9, 2 months ago)  BG at home is 142 fasting in am, 169    Review of Systems:  Review of Systems   Constitutional: Negative for chills, fever and malaise/fatigue.   HENT: Negative for congestion and sinus pain.    Respiratory: Negative for cough, sputum production and shortness of breath.    Cardiovascular: Negative for chest pain, palpitations, orthopnea and leg swelling.   Gastrointestinal: Negative for abdominal pain, diarrhea, nausea and vomiting.   Genitourinary: Negative for dysuria and urgency.   Musculoskeletal: Negative for back pain, falls and joint pain.   Skin: Negative for rash.   Neurological: Negative for seizures, weakness and headaches.   Psychiatric/Behavioral: The patient is not nervous/anxious.    All other systems reviewed and are negative.      Past Medical History:   Diagnosis Date   • Diabetes (HCC)        Past Surgical History:   Procedure Laterality Date   • IRRIGATION & DEBRIDEMENT ORTHO Left 6/24/2019    Procedure: IRRIGATION AND DEBRIDEMENT, WOUND-FOOT, BIOLOGIC PLACEMENT, WOUND VAC PLACEMENT;  Surgeon: Charles Chopra M.D.;  Location: SURGERY [facility];  Service: Orthopedics       History reviewed. No pertinent family history.    Social History     Socioeconomic History   • Marital status:       Spouse name: Not on file   • Number of children: Not on file   • Years of education: Not on file   • Highest education level: Not on file   Occupational History   • Not on file   Social Needs   • Financial resource strain: Not on file   • Food insecurity:     Worry: Not on file     Inability: Not on file   • Transportation needs:     Medical: Not on file     Non-medical: Not on file   Tobacco Use   • Smoking status: Never Smoker   • Smokeless tobacco: Never Used   Substance and Sexual Activity   • Alcohol use: No   • Drug use: No   • Sexual activity: Not on file   Lifestyle   • Physical activity:     Days per week: Not on file     Minutes per session: Not on file   • Stress: Not on file   Relationships   • Social connections:     Talks on phone: Not on file     Gets together: Not on file     Attends Congregational service: Not on file     Active member of club or organization: Not on file     Attends meetings of clubs or organizations: Not on file     Relationship status: Not on file   • Intimate partner violence:     Fear of current or ex partner: Not on file     Emotionally abused: Not on file     Physically abused: Not on file     Forced sexual activity: Not on file   Other Topics Concern   • Not on file   Social History Narrative   • Not on file       No Known Allergies    Medications:  Current Outpatient Medications on File Prior to Visit   Medication Sig Dispense Refill   • DAPTOmycin (CUBICIN) 500 MG Recon Soln by Intravenous route.     • atorvastatin (LIPITOR) 40 MG Tab Take 40 mg by mouth every evening.     • metformin (GLUCOPHAGE) 1000 MG tablet Take 1,000 mg by mouth 2 times a day, with meals.     • clopidogrel (PLAVIX) 75 MG Tab Take 75 mg by mouth every bedtime.  2   • glipiZIDE (GLUCOTROL) 5 MG Tab Take 5 mg by mouth 2 times a day.     • aspirin 81 MG EC tablet Take 1 Tab by mouth every day. 30 Tab 3     No current facility-administered medications on file prior to visit.        Physical Exam:    Vital Signs: /70 (BP Location: Right arm, Patient Position: Sitting, BP Cuff Size: Adult)   Pulse 89   Temp 36.5 °C (97.7 °F) (Temporal)   Ht 1.829 m (6')   Wt 76.2 kg (168 lb)   SpO2 94%   BMI 22.78 kg/m²   Vital signs reviewed  Physical Exam   Constitutional: He is oriented to person, place, and time. No distress.   HENT:   Head: Normocephalic and atraumatic.   Mouth/Throat: Oropharynx is clear and moist.   Eyes: Conjunctivae are normal. No scleral icterus.   Neck: Neck supple.   Cardiovascular: Normal rate, regular rhythm, normal heart sounds and intact distal pulses.   No murmur heard.  Pulmonary/Chest: Effort normal and breath sounds normal. No stridor. No respiratory distress. He has no wheezes. He has no rales.   Abdominal: Soft. Bowel sounds are normal. He exhibits no distension. There is no tenderness. There is no rebound and no guarding.   Musculoskeletal: Normal range of motion. He exhibits no edema or deformity.   Left leg with some necrotic areas in digits. Per patient this was improving.    Neurological: He is alert and oriented to person, place, and time.   Skin: Skin is warm. No rash noted. He is not diaphoretic. No erythema.   Psychiatric: He has a normal mood and affect. His behavior is normal. Thought content normal.   Nursing note and vitals reviewed.      LABS:  WBC   Date/Time Value Ref Range Status   09/03/2019 12:11 PM 6.7 4.8 - 10.8 K/uL Final     RBC   Date/Time Value Ref Range Status   09/03/2019 12:11 PM 3.83 (L) 4.70 - 6.10 M/uL Final     Hemoglobin   Date/Time Value Ref Range Status   09/03/2019 12:11 PM 11.1 (L) 14.0 - 18.0 g/dL Final     Hematocrit   Date/Time Value Ref Range Status   09/03/2019 12:11 PM 34.4 (L) 42.0 - 52.0 % Final     MCV   Date/Time Value Ref Range Status   09/03/2019 12:11 PM 89.8 81.4 - 97.8 fL Final     MCH   Date/Time Value Ref Range Status   09/03/2019 12:11 PM 29.0 27.0 - 33.0 pg Final     MCHC   Date/Time Value Ref Range Status   09/03/2019  12:11 PM 32.3 (L) 33.7 - 35.3 g/dL Final     MPV   Date/Time Value Ref Range Status   09/03/2019 12:11 PM 11.6 9.0 - 12.9 fL Final     Sodium   Date/Time Value Ref Range Status   09/03/2019 12:11  135 - 145 mmol/L Final     Potassium   Date/Time Value Ref Range Status   09/03/2019 12:11 PM 4.2 3.6 - 5.5 mmol/L Final     Chloride   Date/Time Value Ref Range Status   09/03/2019 12:11  96 - 112 mmol/L Final     Co2   Date/Time Value Ref Range Status   09/03/2019 12:11 PM 23 20 - 33 mmol/L Final     Glucose   Date/Time Value Ref Range Status   09/03/2019 12:11  (H) 65 - 99 mg/dL Final     Bun   Date/Time Value Ref Range Status   09/03/2019 12:11 PM 17 8 - 22 mg/dL Final     Creatinine   Date/Time Value Ref Range Status   09/03/2019 12:11 PM 0.61 0.50 - 1.40 mg/dL Final   05/09/2007 01:20 AM 0.9 0.5 - 1.4 mg/dL Final     Alkaline Phosphatase   Date/Time Value Ref Range Status   08/22/2019 03:53 AM 62 30 - 99 U/L Final     AST(SGOT)   Date/Time Value Ref Range Status   08/22/2019 03:53 AM 11 (L) 12 - 45 U/L Final     ALT(SGPT)   Date/Time Value Ref Range Status   08/22/2019 03:53 AM 14 2 - 50 U/L Final     Total Bilirubin   Date/Time Value Ref Range Status   08/22/2019 03:53 AM 0.4 0.1 - 1.5 mg/dL Final      CPK Total   Date/Time Value Ref Range Status   08/26/2019 10:00 PM 28 0 - 154 U/L Final        MICRO:  8/22 Left foot wound: Cx positive for MSSA and amp sensitive E faecalis.   8/22 BCx negative 2/2    IMAGING STUDIES:  8/21 MRI foot    1.  First metatarsal-phalangeal joint septic arthropathy    2.  Osteomyelitis affecting the entire first metatarsal, proximal phalanx and sesamoids. Edema and enhancement in the adjacent medial cuneiform could be reactive or secondary to early septic arthropathy and osteomyelitis centered at the first TMT joint    3.  Suspicious for early second medial metatarsal head osteomyelitis. Septic arthropathy cannot be excluded    4.  Medial first metatarsal-phalangeal  centered greater than dorsal medial forefoot skin ulceration with cellulitis and phlegmonous change. No abscess identified    5.  Second metatarsal shaft edema is more likely from stress response than infection    6.  Plantar fasciitis    8/22 US arteries bilateral:  Right.    Mild plaque and calcifications throughout the extremity.   Triphasic waveforms and normal velocities seen from the common femoral to    the popliteal artery.   Possible ulceration of distal femoral artery vs. artifact.   Monophasic waveforms in the posterior tibial, anterior tibial and peroneal    arteries.     Left.    Mild plaque and calcifications throughout the extremity.   Triphasic waveforms with normal velocities seen from the common femoral to    the popliteal artery.   Monophasic waveforms found in the posterior tibial, anterior tibial, and    peroneal arteries.   Severely dampened waveform in the distal peroneal artery.    Assessment/Plan:   54 yo  male with   1. Nonhealing  left diabetic foot infection with underlying osteomyelitis. Cx positive for MSSA and amp sensitive E faecalis.   - pt refuses amputation.   2. Uncontrolled diabetes mellitus  3. Peripheral arterial diease    Plan  Continue daptomycin x 6weeks (stop date 10/2) as prescribed  Check ESR, CK  Wound care. Pt requested calcium alginate pads.   Emphasized the need to continue control his blood sugar. A1C is slowly improving in the past year, but still high.   Follow up with ortho  Follow up with us in 1 month. Will likely need to change to oral antibiotics after completing 6 weeks of dapto.     Drake Oquendo D.O.

## 2019-09-11 ASSESSMENT — ENCOUNTER SYMPTOMS
COUGH: 0
SHORTNESS OF BREATH: 0
SINUS PAIN: 0
HEADACHES: 0
BACK PAIN: 0
DIARRHEA: 0
ORTHOPNEA: 0
ABDOMINAL PAIN: 0
NAUSEA: 0
SPUTUM PRODUCTION: 0
SEIZURES: 0
PALPITATIONS: 0
NERVOUS/ANXIOUS: 0
WEAKNESS: 0
FALLS: 0
VOMITING: 0

## 2019-10-03 ENCOUNTER — TELEPHONE (OUTPATIENT)
Dept: INFECTIOUS DISEASES | Facility: MEDICAL CENTER | Age: 54
End: 2019-10-03

## 2019-10-03 NOTE — TELEPHONE ENCOUNTER
Pt called back while he was at [place] wound care. Infectious Disease informed him to go to the [facility] ER because he has a new wound on a different toe. Pt may need surgical intervention and/or additional or new IV medication. Pt verbally understood and will go to the [facility] ER to have wounds evaluated. MOHINI

## 2019-10-03 NOTE — TELEPHONE ENCOUNTER
I agreed with the note documented by Fiorella Cleveland.     I spoke with Dr. Ruff, Podiatry at [place] at [phone] regarding patient's foot condition.     Pt is currently at the clinic at [facility] being seen by Dr. Ruff.   Dr. Ruff informed that patient's foot appears worsening, foul odor and warrants surgical intervention (amputation). Pt is on daptomycin for his MSSA and ASE nonhealing left diabetic foot infection with underlying osteomyelitis. Discussion about amputation has been ongoing but both patient and family seems to be reluctant about the idea of amputation.     I advised to send to [facility] ER for further management. Would need surgical intervention, broaden antibiotic spectrum and perfusion studies.     Drake Oquendo, DO

## 2019-10-03 NOTE — TELEPHONE ENCOUNTER
Brook with [facility] is calling to stati pt is to finish up IV antibiotic today. He was seen by [facility] wound care and they state pt has new wound on different toe and Home health RN does not want to pull PICC just incase he needs additional medication. Called and LM for pt to call the office to move appointment sooner. MOHINI

## 2019-10-15 ENCOUNTER — TELEPHONE (OUTPATIENT)
Dept: INFECTIOUS DISEASES | Facility: MEDICAL CENTER | Age: 54
End: 2019-10-15

## 2019-10-15 NOTE — TELEPHONE ENCOUNTER
Called and LM for pt to return my call to reschedule a missed appointment with Infectious Disease.  -AMP

## 2019-12-16 ENCOUNTER — APPOINTMENT (OUTPATIENT)
Dept: RADIOLOGY | Facility: MEDICAL CENTER | Age: 54
DRG: 616 | End: 2019-12-16
Attending: EMERGENCY MEDICINE
Payer: COMMERCIAL

## 2019-12-16 ENCOUNTER — APPOINTMENT (OUTPATIENT)
Dept: CARDIOLOGY | Facility: MEDICAL CENTER | Age: 54
DRG: 616 | End: 2019-12-16
Attending: INTERNAL MEDICINE
Payer: COMMERCIAL

## 2019-12-16 ENCOUNTER — HOSPITAL ENCOUNTER (INPATIENT)
Facility: MEDICAL CENTER | Age: 54
LOS: 11 days | DRG: 616 | End: 2019-12-27
Attending: EMERGENCY MEDICINE | Admitting: FAMILY MEDICINE
Payer: COMMERCIAL

## 2019-12-16 ENCOUNTER — APPOINTMENT (OUTPATIENT)
Dept: RADIOLOGY | Facility: MEDICAL CENTER | Age: 54
DRG: 616 | End: 2019-12-16
Attending: STUDENT IN AN ORGANIZED HEALTH CARE EDUCATION/TRAINING PROGRAM
Payer: COMMERCIAL

## 2019-12-16 ENCOUNTER — APPOINTMENT (OUTPATIENT)
Dept: CARDIOLOGY | Facility: MEDICAL CENTER | Age: 54
DRG: 616 | End: 2019-12-16
Attending: STUDENT IN AN ORGANIZED HEALTH CARE EDUCATION/TRAINING PROGRAM
Payer: COMMERCIAL

## 2019-12-16 DIAGNOSIS — R73.9 HYPERGLYCEMIA: ICD-10-CM

## 2019-12-16 DIAGNOSIS — Z89.512 S/P BKA (BELOW KNEE AMPUTATION) UNILATERAL, LEFT (HCC): Primary | ICD-10-CM

## 2019-12-16 DIAGNOSIS — I96 GANGRENE OF TOE OF LEFT FOOT (HCC): ICD-10-CM

## 2019-12-16 DIAGNOSIS — E11.621 DIABETIC ULCER OF LEFT MIDFOOT ASSOCIATED WITH TYPE 2 DIABETES MELLITUS, LIMITED TO BREAKDOWN OF SKIN (HCC): ICD-10-CM

## 2019-12-16 DIAGNOSIS — L97.421 DIABETIC ULCER OF LEFT MIDFOOT ASSOCIATED WITH TYPE 2 DIABETES MELLITUS, LIMITED TO BREAKDOWN OF SKIN (HCC): ICD-10-CM

## 2019-12-16 DIAGNOSIS — E87.1 HYPONATREMIA: ICD-10-CM

## 2019-12-16 LAB
ALBUMIN SERPL BCP-MCNC: 2.9 G/DL (ref 3.2–4.9)
ALBUMIN/GLOB SERPL: 0.6 G/DL
ALP SERPL-CCNC: 85 U/L (ref 30–99)
ALT SERPL-CCNC: 48 U/L (ref 2–50)
ANION GAP SERPL CALC-SCNC: 5 MMOL/L (ref 0–11.9)
ANISOCYTOSIS BLD QL SMEAR: ABNORMAL
APPEARANCE UR: CLEAR
APTT PPP: 80.6 SEC (ref 24.7–36)
AST SERPL-CCNC: 43 U/L (ref 12–45)
BACTERIA #/AREA URNS HPF: NEGATIVE /HPF
BASOPHILS # BLD AUTO: 0 % (ref 0–1.8)
BASOPHILS # BLD: 0 K/UL (ref 0–0.12)
BILIRUB SERPL-MCNC: 0.4 MG/DL (ref 0.1–1.5)
BILIRUB UR QL STRIP.AUTO: NEGATIVE
BUN SERPL-MCNC: 23 MG/DL (ref 8–22)
CALCIUM SERPL-MCNC: 8.4 MG/DL (ref 8.5–10.5)
CHLORIDE SERPL-SCNC: 91 MMOL/L (ref 96–112)
CO2 SERPL-SCNC: 29 MMOL/L (ref 20–33)
COLOR UR: YELLOW
CREAT SERPL-MCNC: 0.88 MG/DL (ref 0.5–1.4)
CRP SERPL HS-MCNC: 6.1 MG/DL (ref 0–0.75)
EKG IMPRESSION: NORMAL
EOSINOPHIL # BLD AUTO: 0 K/UL (ref 0–0.51)
EOSINOPHIL NFR BLD: 0 % (ref 0–6.9)
EPI CELLS #/AREA URNS HPF: NEGATIVE /HPF
ERYTHROCYTE [DISTWIDTH] IN BLOOD BY AUTOMATED COUNT: 49.7 FL (ref 35.9–50)
ERYTHROCYTE [DISTWIDTH] IN BLOOD BY AUTOMATED COUNT: 51.4 FL (ref 35.9–50)
ERYTHROCYTE [SEDIMENTATION RATE] IN BLOOD BY WESTERGREN METHOD: 75 MM/HOUR (ref 0–20)
GLOBULIN SER CALC-MCNC: 5.2 G/DL (ref 1.9–3.5)
GLUCOSE BLD-MCNC: 116 MG/DL (ref 65–99)
GLUCOSE BLD-MCNC: 219 MG/DL (ref 65–99)
GLUCOSE BLD-MCNC: 224 MG/DL (ref 65–99)
GLUCOSE BLD-MCNC: 67 MG/DL (ref 65–99)
GLUCOSE BLD-MCNC: 83 MG/DL (ref 65–99)
GLUCOSE SERPL-MCNC: 366 MG/DL (ref 65–99)
GLUCOSE UR STRIP.AUTO-MCNC: >=1000 MG/DL
HCT VFR BLD AUTO: 32.3 % (ref 42–52)
HCT VFR BLD AUTO: 32.8 % (ref 42–52)
HGB BLD-MCNC: 10.1 G/DL (ref 14–18)
HGB BLD-MCNC: 10.3 G/DL (ref 14–18)
HYALINE CASTS #/AREA URNS LPF: ABNORMAL /LPF
INR PPP: 1.25 (ref 0.87–1.13)
KETONES UR STRIP.AUTO-MCNC: NEGATIVE MG/DL
LACTATE BLD-SCNC: 1.8 MMOL/L (ref 0.5–2)
LEUKOCYTE ESTERASE UR QL STRIP.AUTO: NEGATIVE
LV EJECT FRACT MOD 2C 99903: 45.14
LV EJECT FRACT MOD 4C 99902: 50.71
LV EJECT FRACT MOD BP 99901: 46.9
LYMPHOCYTES # BLD AUTO: 1.07 K/UL (ref 1–4.8)
LYMPHOCYTES NFR BLD: 12.3 % (ref 22–41)
MAGNESIUM SERPL-MCNC: 1.7 MG/DL (ref 1.5–2.5)
MANUAL DIFF BLD: NORMAL
MCH RBC QN AUTO: 26.6 PG (ref 27–33)
MCH RBC QN AUTO: 27 PG (ref 27–33)
MCHC RBC AUTO-ENTMCNC: 30.8 G/DL (ref 33.7–35.3)
MCHC RBC AUTO-ENTMCNC: 31.9 G/DL (ref 33.7–35.3)
MCV RBC AUTO: 84.8 FL (ref 81.4–97.8)
MCV RBC AUTO: 86.3 FL (ref 81.4–97.8)
MICRO URNS: ABNORMAL
MONOCYTES # BLD AUTO: 0.45 K/UL (ref 0–0.85)
MONOCYTES NFR BLD AUTO: 5.2 % (ref 0–13.4)
MORPHOLOGY BLD-IMP: NORMAL
MYELOCYTES NFR BLD MANUAL: 0.9 %
NEUTROPHILS # BLD AUTO: 7.1 K/UL (ref 1.82–7.42)
NEUTROPHILS NFR BLD: 81.6 % (ref 44–72)
NITRITE UR QL STRIP.AUTO: NEGATIVE
NRBC # BLD AUTO: 0 K/UL
NRBC BLD-RTO: 0 /100 WBC
NT-PROBNP SERPL IA-MCNC: 6008 PG/ML (ref 0–125)
PH UR STRIP.AUTO: 7 [PH] (ref 5–8)
PHOSPHATE SERPL-MCNC: 2.2 MG/DL (ref 2.5–4.5)
PLATELET # BLD AUTO: 124 K/UL (ref 164–446)
PLATELET # BLD AUTO: 140 K/UL (ref 164–446)
PLATELET BLD QL SMEAR: NORMAL
PMV BLD AUTO: 10 FL (ref 9–12.9)
PMV BLD AUTO: 10.2 FL (ref 9–12.9)
POTASSIUM SERPL-SCNC: 4 MMOL/L (ref 3.6–5.5)
PROT SERPL-MCNC: 8.1 G/DL (ref 6–8.2)
PROT UR QL STRIP: 30 MG/DL
PROTHROMBIN TIME: 16 SEC (ref 12–14.6)
RBC # BLD AUTO: 3.8 M/UL (ref 4.7–6.1)
RBC # BLD AUTO: 3.81 M/UL (ref 4.7–6.1)
RBC # URNS HPF: ABNORMAL /HPF
RBC BLD AUTO: PRESENT
RBC UR QL AUTO: NEGATIVE
SODIUM SERPL-SCNC: 125 MMOL/L (ref 135–145)
SP GR UR STRIP.AUTO: 1.02
TROPONIN T SERPL-MCNC: 44 NG/L (ref 6–19)
TROPONIN T SERPL-MCNC: 64 NG/L (ref 6–19)
UROBILINOGEN UR STRIP.AUTO-MCNC: 0.2 MG/DL
VARIANT LYMPHS BLD QL SMEAR: NORMAL
WBC # BLD AUTO: 6.3 K/UL (ref 4.8–10.8)
WBC # BLD AUTO: 8.7 K/UL (ref 4.8–10.8)
WBC #/AREA URNS HPF: ABNORMAL /HPF

## 2019-12-16 PROCEDURE — B2111ZZ FLUOROSCOPY OF MULTIPLE CORONARY ARTERIES USING LOW OSMOLAR CONTRAST: ICD-10-PCS | Performed by: INTERNAL MEDICINE

## 2019-12-16 PROCEDURE — 84484 ASSAY OF TROPONIN QUANT: CPT

## 2019-12-16 PROCEDURE — 85652 RBC SED RATE AUTOMATED: CPT

## 2019-12-16 PROCEDURE — 81001 URINALYSIS AUTO W/SCOPE: CPT

## 2019-12-16 PROCEDURE — 700102 HCHG RX REV CODE 250 W/ 637 OVERRIDE(OP): Performed by: STUDENT IN AN ORGANIZED HEALTH CARE EDUCATION/TRAINING PROGRAM

## 2019-12-16 PROCEDURE — 700111 HCHG RX REV CODE 636 W/ 250 OVERRIDE (IP): Performed by: EMERGENCY MEDICINE

## 2019-12-16 PROCEDURE — 82962 GLUCOSE BLOOD TEST: CPT | Mod: 91

## 2019-12-16 PROCEDURE — 93306 TTE W/DOPPLER COMPLETE: CPT

## 2019-12-16 PROCEDURE — 83880 ASSAY OF NATRIURETIC PEPTIDE: CPT

## 2019-12-16 PROCEDURE — 700111 HCHG RX REV CODE 636 W/ 250 OVERRIDE (IP)

## 2019-12-16 PROCEDURE — 85027 COMPLETE CBC AUTOMATED: CPT

## 2019-12-16 PROCEDURE — 700117 HCHG RX CONTRAST REV CODE 255: Performed by: STUDENT IN AN ORGANIZED HEALTH CARE EDUCATION/TRAINING PROGRAM

## 2019-12-16 PROCEDURE — 84100 ASSAY OF PHOSPHORUS: CPT

## 2019-12-16 PROCEDURE — 99223 1ST HOSP IP/OBS HIGH 75: CPT | Performed by: INTERNAL MEDICINE

## 2019-12-16 PROCEDURE — 85007 BL SMEAR W/DIFF WBC COUNT: CPT

## 2019-12-16 PROCEDURE — 96372 THER/PROPH/DIAG INJ SC/IM: CPT

## 2019-12-16 PROCEDURE — 36415 COLL VENOUS BLD VENIPUNCTURE: CPT

## 2019-12-16 PROCEDURE — 700101 HCHG RX REV CODE 250

## 2019-12-16 PROCEDURE — 96365 THER/PROPH/DIAG IV INF INIT: CPT

## 2019-12-16 PROCEDURE — 93306 TTE W/DOPPLER COMPLETE: CPT | Mod: 26 | Performed by: INTERNAL MEDICINE

## 2019-12-16 PROCEDURE — 99285 EMERGENCY DEPT VISIT HI MDM: CPT

## 2019-12-16 PROCEDURE — 700111 HCHG RX REV CODE 636 W/ 250 OVERRIDE (IP): Performed by: STUDENT IN AN ORGANIZED HEALTH CARE EDUCATION/TRAINING PROGRAM

## 2019-12-16 PROCEDURE — A9270 NON-COVERED ITEM OR SERVICE: HCPCS | Performed by: STUDENT IN AN ORGANIZED HEALTH CARE EDUCATION/TRAINING PROGRAM

## 2019-12-16 PROCEDURE — 700102 HCHG RX REV CODE 250 W/ 637 OVERRIDE(OP): Performed by: INTERNAL MEDICINE

## 2019-12-16 PROCEDURE — 99152 MOD SED SAME PHYS/QHP 5/>YRS: CPT | Performed by: INTERNAL MEDICINE

## 2019-12-16 PROCEDURE — 4A023N7 MEASUREMENT OF CARDIAC SAMPLING AND PRESSURE, LEFT HEART, PERCUTANEOUS APPROACH: ICD-10-PCS | Performed by: INTERNAL MEDICINE

## 2019-12-16 PROCEDURE — 85730 THROMBOPLASTIN TIME PARTIAL: CPT

## 2019-12-16 PROCEDURE — 83605 ASSAY OF LACTIC ACID: CPT

## 2019-12-16 PROCEDURE — 700117 HCHG RX CONTRAST REV CODE 255: Performed by: INTERNAL MEDICINE

## 2019-12-16 PROCEDURE — 85610 PROTHROMBIN TIME: CPT

## 2019-12-16 PROCEDURE — 93005 ELECTROCARDIOGRAM TRACING: CPT | Performed by: EMERGENCY MEDICINE

## 2019-12-16 PROCEDURE — 71045 X-RAY EXAM CHEST 1 VIEW: CPT

## 2019-12-16 PROCEDURE — 71275 CT ANGIOGRAPHY CHEST: CPT

## 2019-12-16 PROCEDURE — 73630 X-RAY EXAM OF FOOT: CPT | Mod: LT

## 2019-12-16 PROCEDURE — 700105 HCHG RX REV CODE 258: Performed by: STUDENT IN AN ORGANIZED HEALTH CARE EDUCATION/TRAINING PROGRAM

## 2019-12-16 PROCEDURE — 700105 HCHG RX REV CODE 258: Performed by: EMERGENCY MEDICINE

## 2019-12-16 PROCEDURE — 93970 EXTREMITY STUDY: CPT

## 2019-12-16 PROCEDURE — 87040 BLOOD CULTURE FOR BACTERIA: CPT | Mod: 91

## 2019-12-16 PROCEDURE — 80053 COMPREHEN METABOLIC PANEL: CPT

## 2019-12-16 PROCEDURE — C1887 CATHETER, GUIDING: HCPCS

## 2019-12-16 PROCEDURE — 93458 L HRT ARTERY/VENTRICLE ANGIO: CPT | Mod: 26 | Performed by: INTERNAL MEDICINE

## 2019-12-16 PROCEDURE — 770020 HCHG ROOM/CARE - TELE (206)

## 2019-12-16 PROCEDURE — 93005 ELECTROCARDIOGRAM TRACING: CPT

## 2019-12-16 PROCEDURE — 86140 C-REACTIVE PROTEIN: CPT

## 2019-12-16 PROCEDURE — 83735 ASSAY OF MAGNESIUM: CPT

## 2019-12-16 PROCEDURE — A9270 NON-COVERED ITEM OR SERVICE: HCPCS | Performed by: INTERNAL MEDICINE

## 2019-12-16 RX ORDER — CARVEDILOL 3.12 MG/1
3.12 TABLET ORAL 2 TIMES DAILY WITH MEALS
Status: DISCONTINUED | OUTPATIENT
Start: 2019-12-16 | End: 2019-12-17

## 2019-12-16 RX ORDER — HEPARIN SODIUM 1000 [USP'U]/ML
3200 INJECTION, SOLUTION INTRAVENOUS; SUBCUTANEOUS PRN
Status: DISCONTINUED | OUTPATIENT
Start: 2019-12-16 | End: 2019-12-17

## 2019-12-16 RX ORDER — BISACODYL 10 MG
10 SUPPOSITORY, RECTAL RECTAL
Status: DISCONTINUED | OUTPATIENT
Start: 2019-12-16 | End: 2019-12-27 | Stop reason: HOSPADM

## 2019-12-16 RX ORDER — VERAPAMIL HYDROCHLORIDE 2.5 MG/ML
INJECTION, SOLUTION INTRAVENOUS
Status: COMPLETED
Start: 2019-12-16 | End: 2019-12-16

## 2019-12-16 RX ORDER — SODIUM CHLORIDE 9 MG/ML
500 INJECTION, SOLUTION INTRAVENOUS ONCE
Status: COMPLETED | OUTPATIENT
Start: 2019-12-16 | End: 2019-12-16

## 2019-12-16 RX ORDER — HEPARIN SODIUM 200 [USP'U]/100ML
INJECTION, SOLUTION INTRAVENOUS
Status: COMPLETED
Start: 2019-12-16 | End: 2019-12-16

## 2019-12-16 RX ORDER — CLONIDINE HYDROCHLORIDE 0.1 MG/1
0.1 TABLET ORAL EVERY 6 HOURS PRN
Status: DISCONTINUED | OUTPATIENT
Start: 2019-12-16 | End: 2019-12-27 | Stop reason: HOSPADM

## 2019-12-16 RX ORDER — SODIUM CHLORIDE 9 MG/ML
INJECTION, SOLUTION INTRAVENOUS
Status: ACTIVE
Start: 2019-12-16 | End: 2019-12-17

## 2019-12-16 RX ORDER — ENALAPRILAT 1.25 MG/ML
1.25 INJECTION INTRAVENOUS EVERY 6 HOURS PRN
Status: DISCONTINUED | OUTPATIENT
Start: 2019-12-16 | End: 2019-12-27 | Stop reason: HOSPADM

## 2019-12-16 RX ORDER — HEPARIN SODIUM,PORCINE 1000/ML
VIAL (ML) INJECTION
Status: COMPLETED
Start: 2019-12-16 | End: 2019-12-16

## 2019-12-16 RX ORDER — CLOPIDOGREL BISULFATE 75 MG/1
75 TABLET ORAL
Status: DISCONTINUED | OUTPATIENT
Start: 2019-12-16 | End: 2019-12-21

## 2019-12-16 RX ORDER — VALSARTAN 80 MG/1
40 TABLET ORAL TWICE DAILY
Status: DISCONTINUED | OUTPATIENT
Start: 2019-12-16 | End: 2019-12-17

## 2019-12-16 RX ORDER — ATORVASTATIN CALCIUM 40 MG/1
40 TABLET, FILM COATED ORAL NIGHTLY
Status: DISCONTINUED | OUTPATIENT
Start: 2019-12-16 | End: 2019-12-21

## 2019-12-16 RX ORDER — POLYETHYLENE GLYCOL 3350 17 G/17G
1 POWDER, FOR SOLUTION ORAL
Status: DISCONTINUED | OUTPATIENT
Start: 2019-12-16 | End: 2019-12-27 | Stop reason: HOSPADM

## 2019-12-16 RX ORDER — MIDAZOLAM HYDROCHLORIDE 1 MG/ML
INJECTION INTRAMUSCULAR; INTRAVENOUS
Status: COMPLETED
Start: 2019-12-16 | End: 2019-12-16

## 2019-12-16 RX ORDER — INSULIN GLARGINE 100 [IU]/ML
0.2 INJECTION, SOLUTION SUBCUTANEOUS EVERY EVENING
Status: DISCONTINUED | OUTPATIENT
Start: 2019-12-16 | End: 2019-12-17

## 2019-12-16 RX ORDER — HEPARIN SODIUM 5000 [USP'U]/100ML
INJECTION, SOLUTION INTRAVENOUS CONTINUOUS
Status: DISCONTINUED | OUTPATIENT
Start: 2019-12-16 | End: 2019-12-17

## 2019-12-16 RX ORDER — SODIUM CHLORIDE 9 MG/ML
250 INJECTION, SOLUTION INTRAVENOUS ONCE
Status: COMPLETED | OUTPATIENT
Start: 2019-12-16 | End: 2019-12-16

## 2019-12-16 RX ORDER — LINEZOLID 600 MG/1
600 TABLET, FILM COATED ORAL EVERY 12 HOURS
Status: DISCONTINUED | OUTPATIENT
Start: 2019-12-16 | End: 2019-12-22

## 2019-12-16 RX ORDER — INSULIN GLARGINE 100 [IU]/ML
5 INJECTION, SOLUTION SUBCUTANEOUS ONCE
Status: DISPENSED | OUTPATIENT
Start: 2019-12-16 | End: 2019-12-17

## 2019-12-16 RX ORDER — AMOXICILLIN 250 MG
2 CAPSULE ORAL 2 TIMES DAILY
Status: DISCONTINUED | OUTPATIENT
Start: 2019-12-16 | End: 2019-12-27 | Stop reason: HOSPADM

## 2019-12-16 RX ORDER — HEPARIN SODIUM 1000 [USP'U]/ML
6000 INJECTION, SOLUTION INTRAVENOUS; SUBCUTANEOUS ONCE
Status: COMPLETED | OUTPATIENT
Start: 2019-12-16 | End: 2019-12-16

## 2019-12-16 RX ORDER — INSULIN GLARGINE 100 [IU]/ML
6 INJECTION, SOLUTION SUBCUTANEOUS
Status: ON HOLD | COMMUNITY
End: 2019-12-27

## 2019-12-16 RX ORDER — LIDOCAINE HYDROCHLORIDE 20 MG/ML
INJECTION, SOLUTION INFILTRATION; PERINEURAL
Status: COMPLETED
Start: 2019-12-16 | End: 2019-12-16

## 2019-12-16 RX ADMIN — AMPICILLIN SODIUM AND SULBACTAM SODIUM 3 G: 2; 1 INJECTION, POWDER, FOR SOLUTION INTRAMUSCULAR; INTRAVENOUS at 23:13

## 2019-12-16 RX ADMIN — VERAPAMIL HYDROCHLORIDE 2.5 MG: 2.5 INJECTION INTRAVENOUS at 13:54

## 2019-12-16 RX ADMIN — NITROGLYCERIN 10 ML: 20 INJECTION INTRAVENOUS at 13:54

## 2019-12-16 RX ADMIN — HUMAN ALBUMIN MICROSPHERES AND PERFLUTREN 3 ML: 10; .22 INJECTION, SOLUTION INTRAVENOUS at 09:57

## 2019-12-16 RX ADMIN — SENNOSIDES AND DOCUSATE SODIUM 2 TABLET: 8.6; 5 TABLET ORAL at 07:57

## 2019-12-16 RX ADMIN — MIDAZOLAM HYDROCHLORIDE 2 MG: 1 INJECTION, SOLUTION INTRAMUSCULAR; INTRAVENOUS at 14:04

## 2019-12-16 RX ADMIN — ENOXAPARIN SODIUM 40 MG: 100 INJECTION SUBCUTANEOUS at 07:57

## 2019-12-16 RX ADMIN — ASPIRIN 81 MG: 81 TABLET, COATED ORAL at 07:15

## 2019-12-16 RX ADMIN — HEPARIN SODIUM: 1000 INJECTION, SOLUTION INTRAVENOUS; SUBCUTANEOUS at 13:54

## 2019-12-16 RX ADMIN — CLOPIDOGREL BISULFATE 75 MG: 75 TABLET ORAL at 20:18

## 2019-12-16 RX ADMIN — ATORVASTATIN CALCIUM 40 MG: 40 TABLET, FILM COATED ORAL at 20:18

## 2019-12-16 RX ADMIN — CARVEDILOL 3.12 MG: 3.12 TABLET, FILM COATED ORAL at 17:45

## 2019-12-16 RX ADMIN — SODIUM CHLORIDE 250 ML: 9 INJECTION, SOLUTION INTRAVENOUS at 20:19

## 2019-12-16 RX ADMIN — SODIUM CHLORIDE 500 ML: 9 INJECTION, SOLUTION INTRAVENOUS at 07:58

## 2019-12-16 RX ADMIN — HEPARIN SODIUM 2000 UNITS: 200 INJECTION, SOLUTION INTRAVENOUS at 13:54

## 2019-12-16 RX ADMIN — LIDOCAINE HYDROCHLORIDE: 20 INJECTION, SOLUTION INFILTRATION; PERINEURAL at 13:54

## 2019-12-16 RX ADMIN — AMPICILLIN SODIUM AND SULBACTAM SODIUM 3 G: 2; 1 INJECTION, POWDER, FOR SOLUTION INTRAMUSCULAR; INTRAVENOUS at 17:39

## 2019-12-16 RX ADMIN — HEPARIN SODIUM 6000 UNITS: 1000 INJECTION, SOLUTION INTRAVENOUS; SUBCUTANEOUS at 18:10

## 2019-12-16 RX ADMIN — HEPARIN SODIUM 1200 UNITS/HR: 5000 INJECTION, SOLUTION INTRAVENOUS at 18:12

## 2019-12-16 RX ADMIN — AMPICILLIN SODIUM AND SULBACTAM SODIUM 3 G: 2; 1 INJECTION, POWDER, FOR SOLUTION INTRAMUSCULAR; INTRAVENOUS at 05:40

## 2019-12-16 RX ADMIN — LINEZOLID 600 MG: 600 TABLET, FILM COATED ORAL at 17:45

## 2019-12-16 RX ADMIN — IOHEXOL 25 ML: 350 INJECTION, SOLUTION INTRAVENOUS at 14:07

## 2019-12-16 RX ADMIN — INSULIN LISPRO 2 UNITS: 100 INJECTION, SOLUTION INTRAVENOUS; SUBCUTANEOUS at 23:19

## 2019-12-16 RX ADMIN — FENTANYL CITRATE 50 MCG: 50 INJECTION, SOLUTION INTRAMUSCULAR; INTRAVENOUS at 13:30

## 2019-12-16 RX ADMIN — VALSARTAN 40 MG: 80 TABLET, FILM COATED ORAL at 17:45

## 2019-12-16 RX ADMIN — IOHEXOL 55 ML: 350 INJECTION, SOLUTION INTRAVENOUS at 10:00

## 2019-12-16 ASSESSMENT — COPD QUESTIONNAIRES
HAVE YOU SMOKED AT LEAST 100 CIGARETTES IN YOUR ENTIRE LIFE: NO/DON'T KNOW
COPD SCREENING SCORE: 3
DO YOU EVER COUGH UP ANY MUCUS OR PHLEGM?: NO/ONLY WITH OCCASIONAL COLDS OR INFECTIONS
DURING THE PAST 4 WEEKS HOW MUCH DID YOU FEEL SHORT OF BREATH: SOME OF THE TIME

## 2019-12-16 ASSESSMENT — LIFESTYLE VARIABLES
EVER_SMOKED: NEVER
TOTAL SCORE: 0
EVER FELT BAD OR GUILTY ABOUT YOUR DRINKING: NO
HAVE YOU EVER FELT YOU SHOULD CUT DOWN ON YOUR DRINKING: NO
HAVE PEOPLE ANNOYED YOU BY CRITICIZING YOUR DRINKING: NO
EVER HAD A DRINK FIRST THING IN THE MORNING TO STEADY YOUR NERVES TO GET RID OF A HANGOVER: NO
DO YOU DRINK ALCOHOL: NO
TOTAL SCORE: 0
CONSUMPTION TOTAL: INCOMPLETE
TOTAL SCORE: 0

## 2019-12-16 ASSESSMENT — ENCOUNTER SYMPTOMS
MUSCULOSKELETAL NEGATIVE: 1
COUGH: 1
ORTHOPNEA: 1
GASTROINTESTINAL NEGATIVE: 1
EYES NEGATIVE: 1
PSYCHIATRIC NEGATIVE: 1
NEUROLOGICAL NEGATIVE: 1

## 2019-12-16 NOTE — PROGRESS NOTES
2 RN Skin Check    2 RN skin check complete w/ Chhaya BRINK.   Devices in place: None.  Skin assessed under devices: N/A.  Confirmed pressure ulcers found on: Left foot wounds present on arrival. 2nd/3rd digits on left foot black. Healing wound to left lateral aspect of foot. Sacrum is discolored.  New potential pressure ulcers noted on N/A. Wound consult placed Yes.  The following interventions in place Pillows, Mepilex and Heel float boots.    Pt with wounds on admission. Left foot open wounds present to anterior and medial aspect of foot. 2nd/3rd digits on left foot black. Healing wound to left lateral aspect of foot. Sacrum is discolored, mepilex applied. Images taken and uploaded to Epic. Wound consult placed. Pt will be having surgery today 12/16 for foot wounds.     Bilateral ears, elbows intact without breakdown or redness. Right heel is intact without breakdown, redness, or bogginess.

## 2019-12-16 NOTE — ED NOTES
US at bedside   Low Acute Suicide Risk Risk factors include depression, prior suicidality, previous suicide attempts, prior hospitalizations, poor reactivity to stressors, chronic medical issues. However her protective factors and weigh her risk factors, which include; motivation to participate in outpatient care and seek help, no active substance use, supportive family and friends, therapeutic relationship with outpatient providers, and she is compliant with psychotropic medications prescribed. Patient is not requesting voluntary hospitalization and does not meet criteria for involuntary hospitalization.

## 2019-12-16 NOTE — CONSULTS
Date of Service: 12/16/19    CC: Left foot wound and ischemic second and third toes    HPI: Israel Aviles is a 54 y.o. male who presents with complaints of pain to left foot.  This started at least 6 to 7 months ago after wearing ill fitting shoes.  He had been seen by the limb preservation service and has been treated by Dr. Chopra in the past.  He is undergone previous wound treatment and debridement but is refused amputations prior.  The pain is 4/10 and is described as achy.  The pain is made worse by weightbearing and made better by rest and immobilization.    PMH:   Past Medical History:   Diagnosis Date   • Diabetes (HCC)        PSH:   Past Surgical History:   Procedure Laterality Date   • IRRIGATION & DEBRIDEMENT ORTHO Left 6/24/2019    Procedure: IRRIGATION AND DEBRIDEMENT, WOUND-FOOT, BIOLOGIC PLACEMENT, WOUND VAC PLACEMENT;  Surgeon: Charles Chopra M.D.;  Location: SURGERY [facility];  Service: Orthopedics       FH: History reviewed. No pertinent family history.    SH:   Social History     Socioeconomic History   • Marital status:      Spouse name: Not on file   • Number of children: Not on file   • Years of education: Not on file   • Highest education level: Not on file   Occupational History   • Not on file   Social Needs   • Financial resource strain: Not on file   • Food insecurity:     Worry: Not on file     Inability: Not on file   • Transportation needs:     Medical: Not on file     Non-medical: Not on file   Tobacco Use   • Smoking status: Never Smoker   • Smokeless tobacco: Never Used   Substance and Sexual Activity   • Alcohol use: No   • Drug use: No   • Sexual activity: Not on file   Lifestyle   • Physical activity:     Days per week: Not on file     Minutes per session: Not on file   • Stress: Not on file   Relationships   • Social connections:     Talks on phone: Not on file     Gets together: Not on file     Attends Confucianism service: Not on file     Active member of  club or organization: Not on file     Attends meetings of clubs or organizations: Not on file     Relationship status: Not on file   • Intimate partner violence:     Fear of current or ex partner: Not on file     Emotionally abused: Not on file     Physically abused: Not on file     Forced sexual activity: Not on file   Other Topics Concern   • Not on file   Social History Narrative   • Not on file       ROS: A 10 system review of systems was negative outside what was listed in the HPI    /74   Pulse 94   Temp 37.3 °C (99.1 °F) (Tympanic)   Resp 20   Ht 1.829 m (6')   Wt 74.8 kg (165 lb)   SpO2 93%     Physical Exam:  General: AAOx3, NAD  HEENT: normocephalic, atraumatic  Psych: Normal mood and affect  Neck: supple, no pain to motion  Chest/Pulmonary: breathing unlabored, no audible wheezing  Cardio: regular heart rate and rhythm  Neuro: sensation grossly intact to BUE and BLE, peripheral neuropathy to bilateral feet, moving all four extremities  Skin: Wounds present to medial aspect of left foot, right foot without wounds  MSK: Left foot shows medial wound over the first ray with no active drainage although not completely epithelialized, the second and third toes have dry gangrene without any ascending erythema or active drainage.  Proximal to the foot there are no further wounds up the leg.    Imaging and labs: Left foot x-rays show changes of osteomyelitis to multiple MTP joints including the first ray    Assessment:   1. Gangrene of toe of left foot (HCC)     2. Diabetic ulcer of left midfoot associated with type 2 diabetes mellitus, limited to breakdown of skin (HCC)     3. Hyponatremia     4. Hyperglycemia         We discussed the diagnosis and findings with the patient at length.  We reviewed possible non operative and operative interventions and the risks and benefits of these.  I would recommend at least debridement and amputation of the second and third toes as these are clearly gangrenous and  have declared themselves.  He likely will need eventual transmetatarsal versus below-knee amputation given the other active wounds and the signs of osteomyelitis and multiple MTP joints including the first ray.  At this point we will await further medical work-up as patient also presented with chest pain.  Assuming this work-up is negative the patient could go to the operating room as early as today or Wednesday if he is not cleared today.  He had a chance to ask questions and all of these were answered to his satisfaction.        Plan:  To OR for amputation of second and third toes and wound debridement when medically cleared  If not cleared for surgery today does not need to be n.p.o. from surgical standpoint  Limb preservation service consult  Heel weightbearing to left lower extremity

## 2019-12-16 NOTE — NON-PROVIDER
"CC  Weakness and shortness of breath     HPI  Israel Aviles is a 55 y/o M with a PMHx of DM2, stroke, and diabetic L foot ulcers, who presents to the ED this morning c/o weakness and SOB. History was obtained from the patient and his wife. He has felt weakness for several weeks, especially in his left leg. He denies fever, admits to chills but his wife attributes this to the cold weather. He has been using a walker since June. He reports that his diabetes has been well controlled with Metformin, Glipizide, and Regular Insulin. His morning blood glucose readings have been ranging from 121-150 according to his wife. He believes that his last A1C was 7.1.  Pt reports SOB which started at 1am, when he was getting off the plane in [place]. He reports a feeling of heaviness at that time, but no pain. He had a stroke last year, at which time he had a \"blurry voice\" which has since resolved.  Pt currently has a L foot ulcer, which he reports performing regular cleaning and dressing changes while in [place].       REVIEW OF SYSTEMS  Constitutional: +chills, denies fever  HEENT: No HA  CVS: No chest pain  Pulm: +SOB, denies cough  GI: No N/V  : No dysuria  MSK: denies pain, + partial numbness in left foot       PAST MEDICAL HISTORY   has a past medical history of Diabetes (HCC).     SOCIAL HISTORY  Denies smoking, EtOH, drug use. He is  with children.     SURGICAL HISTORY  Debridement of left foot 6/24/19     CURRENT MEDICATIONS  Pt reports taking metformin, glipizide, and regular insulin at home.     ALLERGIES  No Known Allergies     PHYSICAL EXAM  VITAL SIGNS:   BP: 127/71  Tmax: 99.4F (37.4C)  HR: 96-99 (96)  RR: 18-25 (21)  BP: 127-139/80-90 (129/80)  Ht: 6' (1.829m)  Wt: 165 lb (74.8 kg)    Constitutional: No acute distress. Pt appears tired, lying in bed. He sits up in bed with effort.  Skin: Noted ulcer on dorsum of left foot. Black gangrene on 2nd and 3rd digits of L foot.  HEENT: PERRL, No LAD  CV: Tachycardia, " regular rhythm. I did not appreciate any clear murmurs  Pulm: CTABL, no resp distress. No wheezes  Extremeties: Skin of left foot and ankle edematous, erythematous, and warm. Wet ulcer noted on medial dorsum of left foot. Black gangrene noted on 2nd and 3rd digits of left foot.  Numbness of left 1st digit. Pt is able to feel inferior aspect of left foot.  Neuro: Cranial nerves grossly intact  Psych: Normal affect, judgement, insight                  LABS  CBC:  WBC: 8.7  HgB: 10.1  HCT: 32.8  PLT: 140    MCV: 86.3    CMP:  Na: 125  K: 4.0  Cl: 91  Bicarb: 29  BUN: 23  Cr: 0.88  Glu: 219    Troponin T: 64  NT-proBNP: 6008  CRP: 6.1      RADIOLOGY  DX-FOOT-COMPLETE 3+ LEFT   Final Result           1.  Fracture at the neck of the third toe proximal phalanx   2.  Fracture through the base of the second toe proximal phalanx with new osteolysis, appears likely related to pathologic fracture from suspected osteomyelitis.   3.  Osteolytic at the first metatarsophalangeal joint, increased since prior study, appearance favors progressive osteomyelitis.   4.  New osteolytic changes at the first tarsometatarsal joint, appearance most compatible with osteomyelitis.   5.  New areas of osteolysis involving the second, third, and fifth metatarsal heads, concerning for osteomyelitis   6.  Atherosclerosis       DX-CHEST-PORTABLE (1 VIEW)   Final Result           1.  Hazy and reticular bilateral lung base opacities, greater on the right, appearance suggests atelectasis and/or right lung base infiltrate.   2.  Trace right pleural effusion                ASSESSMENT/PLAN:  Pt is a 53 y/o M w/ PMHx of DM2, stroke, and L foot ulcer presented to the ED this morning due to weakness and SOB. He was found to have osteomyelitis in his left foot, with gangrenous 2nd and 3rd toes on his left foot. He also has elevated troponin and BNP with abnormal ECG and echocardiogram. He is afebrile and normotensive.    #Osteomyelitis  Osteomyelitis (from  diabetic foot ulcer and gangrenous toes) found on pt's 1st metatarsophalangeal joint, and in his 2nd, 3rd, and 5th metatarsal heads. Numbness of 1st toe on L foot.  - Admit to telemetry  - Start zosyn, 4.5g IV over 30 minutes, q6hr  - Order blood cultures  - ID consult  - Orthopedic surgery consult  - Vascular surgery consult    #Abnormal ECG  Pt elevated troponin at 64 and elevated NT-proBNP of 6008 (normal = 125). His ECG showed sinus rhythm with HR at 99, inverted T-waves, and poor R wave progression. RADAMES showed EF of 30-35%, and echodensity adjacent to LV apex, which is concerning for possible LV thrombus per cardiology.  He presented this morning with SOB. Based on CXR showing atelectasis and no signs of pneumonia, SOB was likely caused by HF.  - Continue anticoagulation, consider discontinuing if necessary when patient is ready for surgery  - See cardiology note for medication changes  - Discontinue IV fluids  - Trend troponin    #DM2, uncontrolled  Pt reports morning BG readings ranging from 121-150, and reports A1C of 7.1. Current BG is   - Sliding scale insulin  - Order A1C  - Re-evaluate DM control as outpatient    #Hyponatremia  Pt had an Na of 125 with BG of 366. Corrected Na is 131.  - Gave 500cc bolus NS. Hold further fluids due to possible HF.

## 2019-12-16 NOTE — H&P
FAMILY MEDICINE HISTORY AND PHYSICAL       PATIENT ID:  NAME:  Israel Aviles  MRN:               [id number]  YOB: 1965    Date of Admission: 12/16/2019      Attending: Sung Mcnally M.D.   Admitting  Resident: Yanni Ingram M.D. (PGY-2)      Primary Care Physician:  Mj Bai M.D.    CC:  Gangrenous Toes     HPI: Israel Aviles is a 54 y.o male with a history of DM2, HTN, PVD, CVA, and left foot ulcers with osteomyelitis who presents today with worsening grangrenous toes found to have osteomyelitis of first, second, third, and fifth toes. He was previously hospitalized in August of 2019 and found to have septic arthropathy, osteomyelitis of entire first metatarsal, proximal phalanx and sesamoids, and second medial metatarsal head osteomyelitis. Patient was advised to have BKA by ID and vascular surgery given the extent of the osteomyelitis, but the patient refused amputation and wanted long course of IV abx. Patient had PICC line placed 8/26/19 and patient was discharged with IV Daptomycin per ID and sensitivities. Patient and family do not remember when he completed course of IV abx, but they state they were compliant with course and patient continued to have wound care up until he went to [place] three weeks ago. They state that in [place] he was hospitalized for shortness of breath for which he received antibiotics. They state he did not have pneumonia yet he was being treated with antibiotics for 5 days. He was discharged and then [place] back to the [place] yesterday. The flight was 17 hours longs and he states that he has continued to feel short of breath with a dry cough. He notes BL LE swelling which he has had since he was in [facility]. Patient also states that he occasionally takes insulin for diabetes, but not always. He states that he is compliant with his diabetic oral agents, but not usually take his insulin.     Patient endorses shortness of breath, cough, fevers, chills, leg  pain  Patient denies chest pain, headaches, vision changes, nausea, vomiting, diarrhea, or constipation       ED Course: Patient admitted, tachycardic to 90-100s, RR 20-22. Foot Xray showing worsening left metatarsophalangeal joints, second, thirds and fifth metatarsals. CMP with Na of 125, chloride 91, glucose 366, BUN 23, calcium 8.4, albumin 2.9. CBC with normal WBC at 8.7, hgb of 10.1. UA with glucose >1000, protein 30. ECG with T wave changes in inferiolateral leads and elevated troponin of 64.     REVIEW OF SYSTEMS:   Constitutional: + fevers, + chills, no recent unintended weight loss   HEENT: No HA, No changes in vision, no recent loss of hearing   CVS: No Chest pain, no orthopnea   Pulm: + SOB, + dyspnea   GI: No N/V, No Diarrhea, no blood in stool   : No dysuria, no urgency/frequence, no hematuria   MSK: no myalgia, + BL LE swelling, + left foot gangrenous toes, + left leg pain   Skin: No recent rashes   Psych: No thoughts of self harm                PAST MEDICAL HISTORY:  Past Medical History:   Diagnosis Date   • Diabetes (HCC)        PAST SURGICAL HISTORY:  Past Surgical History:   Procedure Laterality Date   • IRRIGATION & DEBRIDEMENT ORTHO Left 6/24/2019    Procedure: IRRIGATION AND DEBRIDEMENT, WOUND-FOOT, BIOLOGIC PLACEMENT, WOUND VAC PLACEMENT;  Surgeon: Charles Chopra M.D.;  Location: SURGERY [facility];  Service: Orthopedics       FAMILY HISTORY:  History reviewed. No pertinent family history.    SOCIAL HISTORY:   Social History     Socioeconomic History   • Marital status:      Spouse name: Not on file   • Number of children: Not on file   • Years of education: Not on file   • Highest education level: Not on file   Occupational History   • Not on file   Social Needs   • Financial resource strain: Not on file   • Food insecurity:     Worry: Not on file     Inability: Not on file   • Transportation needs:     Medical: Not on file     Non-medical: Not on file   Tobacco Use   •  Smoking status: Never Smoker   • Smokeless tobacco: Never Used   Substance and Sexual Activity   • Alcohol use: No   • Drug use: No   • Sexual activity: Not on file   Lifestyle   • Physical activity:     Days per week: Not on file     Minutes per session: Not on file   • Stress: Not on file   Relationships   • Social connections:     Talks on phone: Not on file     Gets together: Not on file     Attends Restoration service: Not on file     Active member of club or organization: Not on file     Attends meetings of clubs or organizations: Not on file     Relationship status: Not on file   • Intimate partner violence:     Fear of current or ex partner: Not on file     Emotionally abused: Not on file     Physically abused: Not on file     Forced sexual activity: Not on file   Other Topics Concern   • Not on file   Social History Narrative   • Not on file       ALLERGIES:  No Known Allergies    OUTPATIENT MEDICATIONS:    Current Facility-Administered Medications:   •  NS (BOLUS) infusion 500 mL, 500 mL, Intravenous, Once, Yanni Ingram M.D.  •  senna-docusate (PERICOLACE or SENOKOT S) 8.6-50 MG per tablet 2 Tab, 2 Tab, Oral, BID **AND** polyethylene glycol/lytes (MIRALAX) PACKET 1 Packet, 1 Packet, Oral, QDAY PRN **AND** magnesium hydroxide (MILK OF MAGNESIA) suspension 30 mL, 30 mL, Oral, QDAY PRN **AND** bisacodyl (DULCOLAX) suppository 10 mg, 10 mg, Rectal, QDAY PRN, Yanni Ingram M.D.  •  Respiratory Care per Protocol, , Nebulization, Continuous RT, Yanni Ingram M.D.  •  enoxaparin (LOVENOX) inj 40 mg, 40 mg, Subcutaneous, DAILY, Yanni Ingram M.D.  •  enalaprilat (VASOTEC) injection 1.25 mg, 1.25 mg, Intravenous, Q6HRS PRN, Yanni Ingram M.D.  •  cloNIDine (CATAPRES) tablet 0.1 mg, 0.1 mg, Oral, Q6HRS PRN, Yanni Ingram M.D.  •  insulin glargine (LANTUS) injection 15 Units, 0.2 Units/kg/day, Subcutaneous, Q EVENING **AND** insulin lispro (HUMALOG) injection 1-6 Units, 1-6 Units,  Subcutaneous, Q6HRS **AND** Accu-Chek Q6 if NPO, , , Q6H **AND** NOTIFY MD and PharmD, , , Once **AND** glucose 4 g chewable tablet 16 g, 16 g, Oral, Q15 MIN PRN **AND** DEXTROSE 10% BOLUS 250 mL, 250 mL, Intravenous, Q15 MIN PRN, Yanni Ingram M.D.    Current Outpatient Medications:   •  DAPTOmycin (CUBICIN) 500 MG Recon Soln, by Intravenous route., Disp: , Rfl:   •  atorvastatin (LIPITOR) 40 MG Tab, Take 40 mg by mouth every evening., Disp: , Rfl:   •  metformin (GLUCOPHAGE) 1000 MG tablet, Take 1,000 mg by mouth 2 times a day, with meals., Disp: , Rfl:   •  clopidogrel (PLAVIX) 75 MG Tab, Take 75 mg by mouth every bedtime., Disp: , Rfl: 2  •  glipiZIDE (GLUCOTROL) 5 MG Tab, Take 5 mg by mouth 2 times a day., Disp: , Rfl:   •  aspirin 81 MG EC tablet, Take 1 Tab by mouth every day., Disp: 30 Tab, Rfl: 3    PHYSICAL EXAM:  Vitals:    19 0318 19 0320 19 0440   BP:  127/71 139/90   Pulse:  99 98   Resp:  18 (!) 22   Temp:  36.4 °C (97.5 °F) 37.4 °C (99.4 °F)   TempSrc:  Tympanic Oral   Weight: 74.8 kg (165 lb)     Height: 1.829 m (6')     , Temp (24hrs), Av.9 °C (98.5 °F), Min:36.4 °C (97.5 °F), Max:37.4 °C (99.4 °F)  , O2 Delivery: None (Room Air)    Gen: No Acute Distress   HEENT: NCAT   Pulm: CTABL, no respiratory distress   Cardio: NS1S2 NMRG   Abdom: NTND BS+  Ext: + gangrenous changes of second and third metatarsals, ulceration at medial aspect of left foot, warmth and erythema noted up to midcalf, 2+ pitting edema     LAB TESTS:   Recent Labs     19  0332   WBC 8.7   RBC 3.80*   HEMOGLOBIN 10.1*   HEMATOCRIT 32.8*   MCV 86.3   MCH 26.6*   RDW 51.4*   PLATELETCT 140*   MPV 10.0   NEUTSPOLYS 81.60*   LYMPHOCYTES 12.30*   MONOCYTES 5.20   EOSINOPHILS 0.00   BASOPHILS 0.00   RBCMORPHOLO Present         Recent Labs     19  0332   SODIUM 125*   POTASSIUM 4.0   CHLORIDE 91*   CO2 29   BUN 23*   CREATININE 0.88   CALCIUM 8.4*   ALBUMIN 2.9*       CULTURES:   Results      "Procedure Component Value Units Date/Time    URINALYSIS CULTURE, IF INDICATED [[id number]]  (Abnormal) Collected:  12/16/19 0541    Order Status:  Completed Specimen:  Urine Updated:  12/16/19 0555     Color Yellow     Character Clear     Specific Gravity 1.019     Ph 7.0     Glucose >=1000 mg/dL      Ketones Negative mg/dL      Protein 30 mg/dL      Bilirubin Negative     Urobilinogen, Urine 0.2     Nitrite Negative     Leukocyte Esterase Negative     Occult Blood Negative     Micro Urine Req Microscopic    BLOOD CULTURE [[id number]] Collected:  12/16/19 0450    Order Status:  Completed Specimen:  Blood from Peripheral Updated:  12/16/19 0525    Narrative:       Per Hospital Policy: Only change Specimen Src: to \"Line\" if  specified by physician order.    BLOOD CULTURE [[id number]] Collected:  12/16/19 0432    Order Status:  Completed Specimen:  Blood from Peripheral Updated:  12/16/19 0448    Narrative:       Per Hospital Policy: Only change Specimen Src: to \"Line\" if  specified by physician order.          IMAGES:  DX-FOOT-COMPLETE 3+ LEFT   Final Result         1.  Fracture at the neck of the third toe proximal phalanx   2.  Fracture through the base of the second toe proximal phalanx with new osteolysis, appears likely related to pathologic fracture from suspected osteomyelitis.   3.  Osteolytic at the first metatarsophalangeal joint, increased since prior study, appearance favors progressive osteomyelitis.   4.  New osteolytic changes at the first tarsometatarsal joint, appearance most compatible with osteomyelitis.   5.  New areas of osteolysis involving the second, third, and fifth metatarsal heads, concerning for osteomyelitis   6.  Atherosclerosis      DX-CHEST-PORTABLE (1 VIEW)   Final Result         1.  Hazy and reticular bilateral lung base opacities, greater on the right, appearance suggests atelectasis and/or right lung base infiltrate.   2.  Trace right pleural effusion      CT-CTA CHEST PULMONARY ARTERY " W/ RECONS    (Results Pending)   US-EXTREMITY VENOUS LOWER BILAT    (Results Pending)   EC-ECHOCARDIOGRAM COMPLETE W/O CONT    (Results Pending)       CONSULTS:   Vascular surgery   Infectious Diseases  Cardiology  Limb preservation services  Wound care     ASSESSMENT/PLAN: 54 y.o. male admitted for worsened osteomyelitis of left metatarsophalangeal joints, second, thirds and fifth metatarsals and elevated cardiac enzymes with ECG changes.    # New worsened osteomyelitis of first metatarsophalangeal joint, second, third, and fifth metatarsal heads  S/P IV Daptomycin through PICC line 8/2019 - completed IV abx course  Xray showing new fractures likely 2/2 suspected osteomyelitis  Xray showing osteolytic changes of first tarsometatarsal joint, second, third, ad fifth metatarsal heads   Lactic acid normal at 1.8   2/4 SIRS criteria with heart rate 90s-100s and RR of 22   - Admission to telemetry   - Consulted vascular surgery   - Started on Zosyn  - Pending blood cultures   - Pending ESR  - Pending CRP   - 500 cc bolus of NS   - Will consult ID   - IP consult to limb preservation services  - IP consult to wound care     # Elevated troponin of 64   ECG with abnormal T waves in anteriolateral leads, prolonged QTc of 514   No chest pain, but SOB  - Consult cardiology  - Ordered echocardiogram   - Repeat troponin 9:30 am     # Shortness of breath   # Lower extremity pitting edema   Recent travel to [place]  Per the family should have been on plavix but not taking it   Echo in 2018 showing EF of 60% with normal LV function, no wall motion abnormality   Venous insufficiency vs recent travel vs new onset HF   - CTA chest rule out pulmonary embolus   - Pending BNP   - Pending echocardiogram  - Consult to cardiology     # DMII uncontrolled  # Complicated by BL foot ulcers and osteomyelitis  # Glucosuria   Patient on metformin and glipizide  States that he was also told to take insulin, takes it occasionally   - Will hold  metformin and glipizide at this time   - Low dose Insulin sliding scale  - Hypoglycemia protocol  - Will give 5 units of lantus at this time   - Consult to dietary for diabetic education    # Pseudohyponatremia  Na of 125 with a BG of 336   Corrected Na of 131  - 500 cc NS bolus   - Repeat BMP     # Anemia   Hgb of 10.1 on admission  Likely anemia of chronic disease  - Consider workup by day team     #Core Measures   VTE PPx: lovenox   Abx: zosyn   Diet: NPO   Fluids: 500 cc bolus   Lines and Tube: PIV   Code Status: Full code        Yanni Ingram M.D.   PGY-2  [facility]   [phone]

## 2019-12-16 NOTE — PROGRESS NOTES
Brief Ortho PA note  Called by ERP to see this pleasant 54 year old NIDDM diabetic gentleman with necrotic L middle toes and a large medial L forefoot ulcer. He has not eaten today.  Images shared with Dr. Zhao, on-call for Ortho. Please let us know when cleared for OR , please keep NPO, we will be happy to take him to OR later today for I+D if feasible. Formal consult pending.    Marco Antonio Castellon, PA-C  [phone]

## 2019-12-16 NOTE — ED PROVIDER NOTES
ED Provider Note    CHIEF COMPLAINT  Chief Complaint   Patient presents with   • Weakness   • Shortness of Breath       HPI  Israel Aviles is a 54 y.o. male who presents for evaluation of weakness.  Patient states he has been feeling weak for months however it has been worse in the past 3 to 4 weeks.  Patient notes he got off of a plane from [place] approximately 3 hours ago where he had been hospitalized for 3 weeks for similar issues.  He notes he has had chronic problems with diabetic foot ulcers and nonhealing wounds.  He has had a PICC line in the past and has been treated osteomyelitis for which he has refused amputation.  He no longer has the PICC line but is on Linezolid and has been on imipenem recently according to his son while he was in [place]    REVIEW OF SYSTEMS  Constitutional: No fevers.  Generalized weakness and fatigue noted  Skin: Ulceration left foot  HEENT: No sore throat, runny nose, sores, trouble swallowing, trouble speaking.  Neck: No neck pain, stiffness, or masses.  Chest: No pain or rashes  Pulm: No shortness of breath, cough, wheezing, stridor, or pain with inspiration/expiration  Gastrointestinal: No nausea, vomiting, diarrhea, constipation, bloating, melena, hematochezia or pain.  Genitourinary: No dysuria or hematuria  Musculoskeletal: No recent trauma.  Ulceration and necrotic toes as pictured  Neurologic: Decreased sensation to both feet due to diabetic neuropathy  Heme: No bleeding or bruising problems.   Immuno: History of nonhealing diabetic foot ulcers and osteomyelitis      PAST MEDICAL HISTORY   has a past medical history of Diabetes (HCC).    SOCIAL HISTORY  Social History     Tobacco Use   • Smoking status: Never Smoker   • Smokeless tobacco: Never Used   Substance and Sexual Activity   • Alcohol use: No   • Drug use: No   • Sexual activity: Not on file       SURGICAL HISTORY   has a past surgical history that includes irrigation & debridement ortho (Left,  6/24/2019).    CURRENT MEDICATIONS  Home Medications    **Home medications have not yet been reviewed for this encounter**         ALLERGIES  No Known Allergies    PHYSICAL EXAM  VITAL SIGNS: /90   Pulse 98   Temp 37.4 °C (99.4 °F) (Oral)   Resp (!) 22   Ht 1.829 m (6')   Wt 74.8 kg (165 lb)   BMI 22.38 kg/m²    Gen: Appears tired, pale  HEENT: No signs of trauma, Bilateral external ears normal, Nose normal. Conjunctiva pale, Non-icteric.   Neck:  No tenderness, Supple, No masses  Lymphatic: No cervical lymphadenopathy noted.   Cardiovascular: Tachycardia regular rhythm, no murmurs.  Capillary refill 4 to 5 seconds right and left foot.  Diminished pulses to dorsalis pedis bilaterally.  2+ pulses to radial arteries bilaterally  Thorax & Lungs: Normal breath sounds, No respiratory distress, No wheezing bilateral chest rise  Abdomen: Bowel sounds normal, Soft, No tenderness, No masses, No pulsatile masses. No Guarding or rebound  Skin: Warm, Dry.  Lesions as noted in pictures below  Extremities: Diminished pulses, wheezes as noted.  No significant tenderness  Neurologic: Alert , no facial droop, grossly normal coordination and strength  Psychiatric: Affect normal, Judgment normal, Mood normal.               INITIAL IMPRESSION      LABS  Results for orders placed or performed during the hospital encounter of 12/16/19   CBC WITH DIFFERENTIAL   Result Value Ref Range    WBC 8.7 4.8 - 10.8 K/uL    RBC 3.80 (L) 4.70 - 6.10 M/uL    Hemoglobin 10.1 (L) 14.0 - 18.0 g/dL    Hematocrit 32.8 (L) 42.0 - 52.0 %    MCV 86.3 81.4 - 97.8 fL    MCH 26.6 (L) 27.0 - 33.0 pg    MCHC 30.8 (L) 33.7 - 35.3 g/dL    RDW 51.4 (H) 35.9 - 50.0 fL    Platelet Count 140 (L) 164 - 446 K/uL    MPV 10.0 9.0 - 12.9 fL    Neutrophils-Polys 81.60 (H) 44.00 - 72.00 %    Lymphocytes 12.30 (L) 22.00 - 41.00 %    Monocytes 5.20 0.00 - 13.40 %    Eosinophils 0.00 0.00 - 6.90 %    Basophils 0.00 0.00 - 1.80 %    Nucleated RBC 0.00 /100 WBC     Neutrophils (Absolute) 7.10 1.82 - 7.42 K/uL    Lymphs (Absolute) 1.07 1.00 - 4.80 K/uL    Monos (Absolute) 0.45 0.00 - 0.85 K/uL    Eos (Absolute) 0.00 0.00 - 0.51 K/uL    Baso (Absolute) 0.00 0.00 - 0.12 K/uL    NRBC (Absolute) 0.00 K/uL    Anisocytosis 1+    COMP METABOLIC PANEL   Result Value Ref Range    Sodium 125 (L) 135 - 145 mmol/L    Potassium 4.0 3.6 - 5.5 mmol/L    Chloride 91 (L) 96 - 112 mmol/L    Co2 29 20 - 33 mmol/L    Anion Gap 5.0 0.0 - 11.9    Glucose 366 (H) 65 - 99 mg/dL    Bun 23 (H) 8 - 22 mg/dL    Creatinine 0.88 0.50 - 1.40 mg/dL    Calcium 8.4 (L) 8.5 - 10.5 mg/dL    AST(SGOT) 43 12 - 45 U/L    ALT(SGPT) 48 2 - 50 U/L    Alkaline Phosphatase 85 30 - 99 U/L    Total Bilirubin 0.4 0.1 - 1.5 mg/dL    Albumin 2.9 (L) 3.2 - 4.9 g/dL    Total Protein 8.1 6.0 - 8.2 g/dL    Globulin 5.2 (H) 1.9 - 3.5 g/dL    A-G Ratio 0.6 g/dL   LACTIC ACID   Result Value Ref Range    Lactic Acid 1.8 0.5 - 2.0 mmol/L   ESTIMATED GFR   Result Value Ref Range    GFR If African American >60 >60 mL/min/1.73 m 2    GFR If Non African American >60 >60 mL/min/1.73 m 2   TROPONIN   Result Value Ref Range    Troponin T 64 (H) 6 - 19 ng/L   DIFFERENTIAL MANUAL   Result Value Ref Range    Myelocytes 0.90 %    Manual Diff Status PERFORMED    PERIPHERAL SMEAR REVIEW   Result Value Ref Range    Peripheral Smear Review see below    PLATELET ESTIMATE   Result Value Ref Range    Plt Estimation Decreased    MORPHOLOGY   Result Value Ref Range    RBC Morphology Present     Reactive Lymphocytes Few    EKG   Result Value Ref Range    Report       [facility] Emergency [facility].    Test Date:  2019  Pt Name:    TRELL CASTLE              Department: ER  MRN:        [id number]                      Room:        24  Gender:     Male                         Technician: [id number]  :        [birth year]                   Requested By:ER TRIAGE PROTOCOL  Order #:    [id number]                    Ezekiel MD: Jan Mims,  MD    Measurements  Intervals                                Axis  Rate:       99                           P:          78  FL:         140                          QRS:        12  QRSD:       88                           T:          -25  QT:         400  QTc:        514    Interpretive Statements  SINUS RHYTHM  LOW VOLTAGE IN FRONTAL LEADS  BORDERLINE R WAVE PROGRESSION, ANTERIOR LEADS  ABNORMAL T, CONSIDER ISCHEMIA, ANT-LAT LEADS  PROLONGED QT INTERVAL  Compared to ECG 08/22/2019 17:35:55  Low QRS voltage now present  T-wave abnormality now present  Possible ischemia now present  Prolonged QT inter jaya now present  Electronically Signed On 12- 4:18:19 PST by Jan Mims MD         RADIOLOGY  DX-FOOT-COMPLETE 3+ LEFT   Final Result         1.  Fracture at the neck of the third toe proximal phalanx   2.  Fracture through the base of the second toe proximal phalanx with new osteolysis, appears likely related to pathologic fracture from suspected osteomyelitis.   3.  Osteolytic at the first metatarsophalangeal joint, increased since prior study, appearance favors progressive osteomyelitis.   4.  New osteolytic changes at the first tarsometatarsal joint, appearance most compatible with osteomyelitis.   5.  New areas of osteolysis involving the second, third, and fifth metatarsal heads, concerning for osteomyelitis   6.  Atherosclerosis      DX-CHEST-PORTABLE (1 VIEW)   Final Result         1.  Hazy and reticular bilateral lung base opacities, greater on the right, appearance suggests atelectasis and/or right lung base infiltrate.   2.  Trace right pleural effusion          HYDRATION: Based on the patient's presentation of Tachycardia the patient was given IV fluids. IV Hydration was used because oral hydration was not adequate alone. Upon recheck following hydration, the patient was stable.    COURSE & MEDICAL DECISION MAKING  Pertinent Labs & Imaging studies reviewed. (See chart for details)  Patient's  initial evaluation is consistent with gangrene of the left second and third toe as well as a nonhealing diabetic ulcer of the same foot.  Although the patient's labs have returned, I suspect he is getting septic from this and it is notable that he has refused amputation for osteomyelitis in the past.  Patient is on the nasal lid according to his medication list but I discussed the case with the pharmacist who noted that the patient had ampicillin sensitive enterococcus and MSSA in the past, both of which should be covered by [employer].  I will give a dose of this once the blood cultures are drawn.  I discussed the case with family medicine resident as the patient will need admission no matter what the labs show.    6 AM  Patient's labs are generally reassuring however he does have a mildly elevated troponin in the setting of shortness of breath which may be related to a PE.  I discussed this with the admitting family practice resident who will order a CT angiography.  Patient does not have any current chest pain and does not appear hemodynamically unstable although he is mildly tachycardic.  Admission will continue as planned regardless of the results of the CTA.    FINAL IMPRESSION  1. Gangrene of toe of left foot (HCC)    2. Diabetic ulcer of left midfoot associated with type 2 diabetes mellitus, limited to breakdown of skin (HCC)    3. Hyponatremia    4. Hyperglycemia        Electronically signed by: Jan Mims, 12/16/2019 3:46 AM

## 2019-12-16 NOTE — CONSULTS
Reason for Consult:  Asked by Dr Jan Mims M.D. to see this patient with cardiac enzyme elevation for preop  Patient's PCP: Mj Bai M.D.    CC:   Chief Complaint   Patient presents with   • Weakness   • Shortness of Breath       HPI:      54 year old man with PMH DM2, HTN, PAD s/p angioplasty presents with toe gangrene and found osteomyelitis. Consult for preop evaluation given cardiac marker elevation and EKG changes.    Patient currently without symptoms. Denies chest pain, shortness of breath, palpitations, orthopnea, pnd, ext swelling. He says can walk at least 1 mile without stopping. He does not feel physically limited with exerting either with walking or climbing stairs.     Medications / Drug list prior to admission:  No current facility-administered medications on file prior to encounter.      Current Outpatient Medications on File Prior to Encounter   Medication Sig Dispense Refill   • insulin glargine (LANTUS) 100 UNIT/ML Solution Inject 6 Units as instructed every evening as needed. Uses only if above 150      • atorvastatin (LIPITOR) 40 MG Tab Take 40 mg by mouth every evening.     • metformin (GLUCOPHAGE) 1000 MG tablet Take 1,000 mg by mouth 2 times a day, with meals.     • clopidogrel (PLAVIX) 75 MG Tab Take 75 mg by mouth every bedtime.  2   • glipiZIDE (GLUCOTROL) 5 MG Tab Take 5 mg by mouth 2 times a day.     • aspirin 81 MG EC tablet Take 1 Tab by mouth every day. 30 Tab 3       Current list of administered Medications:    Current Facility-Administered Medications:   •  senna-docusate (PERICOLACE or SENOKOT S) 8.6-50 MG per tablet 2 Tab, 2 Tab, Oral, BID, 2 Tab at 12/16/19 0757 **AND** polyethylene glycol/lytes (MIRALAX) PACKET 1 Packet, 1 Packet, Oral, QDAY PRN **AND** magnesium hydroxide (MILK OF MAGNESIA) suspension 30 mL, 30 mL, Oral, QDAY PRN **AND** bisacodyl (DULCOLAX) suppository 10 mg, 10 mg, Rectal, QDAY PRN, Yanni Ingram M.D.  •  Respiratory Care per Protocol,  , Nebulization, Continuous RT, Yanni Ingram M.D.  •  enoxaparin (LOVENOX) inj 40 mg, 40 mg, Subcutaneous, DAILY, Yanni Ingram M.D., 40 mg at 12/16/19 0757  •  enalaprilat (VASOTEC) injection 1.25 mg, 1.25 mg, Intravenous, Q6HRS PRN, Yanni Ingram M.D.  •  cloNIDine (CATAPRES) tablet 0.1 mg, 0.1 mg, Oral, Q6HRS PRN, Yanni Ingram M.D.  •  insulin glargine (LANTUS) injection 15 Units, 0.2 Units/kg/day, Subcutaneous, Q EVENING **AND** insulin lispro (HUMALOG) injection 1-6 Units, 1-6 Units, Subcutaneous, Q6HRS, Stopped at 12/16/19 0700 **AND** Accu-Chek Q6 if NPO, , , Q6H **AND** NOTIFY MD and PharmD, , , Once **AND** glucose 4 g chewable tablet 16 g, 16 g, Oral, Q15 MIN PRN **AND** DEXTROSE 10% BOLUS 250 mL, 250 mL, Intravenous, Q15 MIN PRN, Yanni Ingram M.D.  •  insulin glargine (LANTUS) injection 5 Units, 5 Units, Subcutaneous, Once, Yanni Ingram M.D., Stopped at 12/16/19 0715  •  atorvastatin (LIPITOR) tablet 40 mg, 40 mg, Oral, Nightly, Yanni Ingram M.D.  •  clopidogrel (PLAVIX) tablet 75 mg, 75 mg, Oral, QHS, Yanni Ingram M.D.  •  aspirin EC (ECOTRIN) tablet 81 mg, 81 mg, Oral, DAILY, Yanni Ingram M.D., 81 mg at 12/16/19 0715  •  ampicillin/sulbactam (UNASYN) 3 g in  mL IVPB, 3 g, Intravenous, Q6HRS, Nichole Morel M.D.    Current Outpatient Medications:   •  insulin glargine (LANTUS) 100 UNIT/ML Solution, Inject 6 Units as instructed every evening as needed. Uses only if above 150 , Disp: , Rfl:   •  atorvastatin (LIPITOR) 40 MG Tab, Take 40 mg by mouth every evening., Disp: , Rfl:   •  metformin (GLUCOPHAGE) 1000 MG tablet, Take 1,000 mg by mouth 2 times a day, with meals., Disp: , Rfl:   •  clopidogrel (PLAVIX) 75 MG Tab, Take 75 mg by mouth every bedtime., Disp: , Rfl: 2  •  glipiZIDE (GLUCOTROL) 5 MG Tab, Take 5 mg by mouth 2 times a day., Disp: , Rfl:   •  aspirin 81 MG EC tablet, Take 1 Tab by mouth every day., Disp: 30 Tab, Rfl:  3    Past Medical History:   Diagnosis Date   • Diabetes (HCC)        Past Surgical History:   Procedure Laterality Date   • IRRIGATION & DEBRIDEMENT ORTHO Left 6/24/2019    Procedure: IRRIGATION AND DEBRIDEMENT, WOUND-FOOT, BIOLOGIC PLACEMENT, WOUND VAC PLACEMENT;  Surgeon: Charles Chopra M.D.;  Location: SURGERY [facility];  Service: Orthopedics       History reviewed. No pertinent family history.  Patient family history was personally reviewed, no pertinent family history to current presentation    Social History     Socioeconomic History   • Marital status:      Spouse name: Not on file   • Number of children: Not on file   • Years of education: Not on file   • Highest education level: Not on file   Occupational History   • Not on file   Social Needs   • Financial resource strain: Not on file   • Food insecurity:     Worry: Not on file     Inability: Not on file   • Transportation needs:     Medical: Not on file     Non-medical: Not on file   Tobacco Use   • Smoking status: Never Smoker   • Smokeless tobacco: Never Used   Substance and Sexual Activity   • Alcohol use: No   • Drug use: No   • Sexual activity: Not on file   Lifestyle   • Physical activity:     Days per week: Not on file     Minutes per session: Not on file   • Stress: Not on file   Relationships   • Social connections:     Talks on phone: Not on file     Gets together: Not on file     Attends Sikhism service: Not on file     Active member of club or organization: Not on file     Attends meetings of clubs or organizations: Not on file     Relationship status: Not on file   • Intimate partner violence:     Fear of current or ex partner: Not on file     Emotionally abused: Not on file     Physically abused: Not on file     Forced sexual activity: Not on file   Other Topics Concern   • Not on file   Social History Narrative   • Not on file       ALLERGIES:  No Known Allergies    Review of systems:  A complete review of symptoms was  reviewed with patient. This is reviewed in H&P and PMH. ALL OTHERS reviewed and negative    Physical exam:  Patient Vitals for the past 24 hrs:   BP Temp Temp src Pulse Resp SpO2 Height Weight   19 1131 118/74 -- -- 94 20 93 % -- --   19 1101 127/78 -- -- 95 17 94 % -- --   19 1031 123/76 -- -- 95 18 95 % -- --   19 1001 124/69 -- -- 96 20 98 % -- --   19 0901 121/76 -- -- 96 16 94 % -- --   19 0801 124/80 -- -- 95 16 94 % -- --   19 0733 -- -- -- 96 16 93 % -- --   19 0731 130/77 37.3 °C (99.1 °F) Tympanic 97 16 93 % -- --   19 0701 129/80 -- -- 97 19 96 % -- --   19 0631 128/80 -- -- 97 20 96 % -- --   19 0601 133/80 -- -- 98 20 95 % -- --   19 0531 131/84 -- -- 97 20 95 % -- --   19 0440 139/90 37.4 °C (99.4 °F) Oral 98 (!) 22 -- -- --   19 0320 127/71 36.4 °C (97.5 °F) Tympanic 99 18 -- -- --   19 0318 -- -- -- -- -- -- 1.829 m (6') 74.8 kg (165 lb)     General: No acute distress.   EYES: no jaundice  HEENT: OP clear   Neck: No bruits No JVD.   CVS:  RRR. S1 + S2. No M/R/G. No edema. LE warm w cap refill.   Resp: CTAB. No wheezing or crackles/rhonchi.  Abdomen: Soft, NT, ND  Skin: Grossly nothing acute no obvious rashes  Neurological: Alert, Moves all extremities, no cranial nerve defects on limited exam      Data:  Laboratory studies personally reviewed by me:  Recent Results (from the past 24 hour(s))   EKG    Collection Time: 19  3:15 AM   Result Value Ref Range    Report       [facility] Emergency Dept.    Test Date:  2019  Pt Name:    TRELL CASTLE              Department: ER  MRN:        [id number]                      Room:       [facility]  Gender:     Male                         Technician: [id number]  :        1965                   Requested By:ER TRIAGE PROTOCOL  Order #:    [id number]                    Reading MD: Jan Mims MD    Measurements  Intervals                                 Axis  Rate:       99                           P:          78  SD:         140                          QRS:        12  QRSD:       88                           T:          -25  QT:         400  QTc:        514    Interpretive Statements  SINUS RHYTHM  LOW VOLTAGE IN FRONTAL LEADS  BORDERLINE R WAVE PROGRESSION, ANTERIOR LEADS  ABNORMAL T, CONSIDER ISCHEMIA, ANT-LAT LEADS  PROLONGED QT INTERVAL  Compared to ECG 08/22/2019 17:35:55  Low QRS voltage now present  T-wave abnormality now present  Possible ischemia now present  Prolonged QT inter jaya now present  Electronically Signed On 12- 4:18:19 PST by Jan Mims MD     CBC WITH DIFFERENTIAL    Collection Time: 12/16/19  3:32 AM   Result Value Ref Range    WBC 8.7 4.8 - 10.8 K/uL    RBC 3.80 (L) 4.70 - 6.10 M/uL    Hemoglobin 10.1 (L) 14.0 - 18.0 g/dL    Hematocrit 32.8 (L) 42.0 - 52.0 %    MCV 86.3 81.4 - 97.8 fL    MCH 26.6 (L) 27.0 - 33.0 pg    MCHC 30.8 (L) 33.7 - 35.3 g/dL    RDW 51.4 (H) 35.9 - 50.0 fL    Platelet Count 140 (L) 164 - 446 K/uL    MPV 10.0 9.0 - 12.9 fL    Neutrophils-Polys 81.60 (H) 44.00 - 72.00 %    Lymphocytes 12.30 (L) 22.00 - 41.00 %    Monocytes 5.20 0.00 - 13.40 %    Eosinophils 0.00 0.00 - 6.90 %    Basophils 0.00 0.00 - 1.80 %    Nucleated RBC 0.00 /100 WBC    Neutrophils (Absolute) 7.10 1.82 - 7.42 K/uL    Lymphs (Absolute) 1.07 1.00 - 4.80 K/uL    Monos (Absolute) 0.45 0.00 - 0.85 K/uL    Eos (Absolute) 0.00 0.00 - 0.51 K/uL    Baso (Absolute) 0.00 0.00 - 0.12 K/uL    NRBC (Absolute) 0.00 K/uL    Anisocytosis 1+    COMP METABOLIC PANEL    Collection Time: 12/16/19  3:32 AM   Result Value Ref Range    Sodium 125 (L) 135 - 145 mmol/L    Potassium 4.0 3.6 - 5.5 mmol/L    Chloride 91 (L) 96 - 112 mmol/L    Co2 29 20 - 33 mmol/L    Anion Gap 5.0 0.0 - 11.9    Glucose 366 (H) 65 - 99 mg/dL    Bun 23 (H) 8 - 22 mg/dL    Creatinine 0.88 0.50 - 1.40 mg/dL    Calcium 8.4 (L) 8.5 - 10.5 mg/dL    AST(SGOT) 43 12 - 45 U/L     ALT(SGPT) 48 2 - 50 U/L    Alkaline Phosphatase 85 30 - 99 U/L    Total Bilirubin 0.4 0.1 - 1.5 mg/dL    Albumin 2.9 (L) 3.2 - 4.9 g/dL    Total Protein 8.1 6.0 - 8.2 g/dL    Globulin 5.2 (H) 1.9 - 3.5 g/dL    A-G Ratio 0.6 g/dL   ESTIMATED GFR    Collection Time: 12/16/19  3:32 AM   Result Value Ref Range    GFR If African American >60 >60 mL/min/1.73 m 2    GFR If Non African American >60 >60 mL/min/1.73 m 2   TROPONIN    Collection Time: 12/16/19  3:32 AM   Result Value Ref Range    Troponin T 64 (H) 6 - 19 ng/L   DIFFERENTIAL MANUAL    Collection Time: 12/16/19  3:32 AM   Result Value Ref Range    Myelocytes 0.90 %    Manual Diff Status PERFORMED    PERIPHERAL SMEAR REVIEW    Collection Time: 12/16/19  3:32 AM   Result Value Ref Range    Peripheral Smear Review see below    PLATELET ESTIMATE    Collection Time: 12/16/19  3:32 AM   Result Value Ref Range    Plt Estimation Decreased    MORPHOLOGY    Collection Time: 12/16/19  3:32 AM   Result Value Ref Range    RBC Morphology Present     Reactive Lymphocytes Few    CRP QUANTITIVE (NON-CARDIAC)    Collection Time: 12/16/19  3:32 AM   Result Value Ref Range    Stat C-Reactive Protein 6.10 (H) 0.00 - 0.75 mg/dL   MAGNESIUM    Collection Time: 12/16/19  3:32 AM   Result Value Ref Range    Magnesium 1.7 1.5 - 2.5 mg/dL   PHOSPHORUS    Collection Time: 12/16/19  3:32 AM   Result Value Ref Range    Phosphorus 2.2 (L) 2.5 - 4.5 mg/dL   LACTIC ACID    Collection Time: 12/16/19  4:50 AM   Result Value Ref Range    Lactic Acid 1.8 0.5 - 2.0 mmol/L   WESTERGREN SED RATE    Collection Time: 12/16/19  4:50 AM   Result Value Ref Range    Sed Rate Westergren 75 (H) 0 - 20 mm/hour   proBrain Natriuretic Peptide, NT    Collection Time: 12/16/19  4:50 AM   Result Value Ref Range    NT-proBNP 6008 (H) 0 - 125 pg/mL   URINALYSIS CULTURE, IF INDICATED    Collection Time: 12/16/19  5:41 AM   Result Value Ref Range    Color Yellow     Character Clear     Specific Gravity 1.019 <1.035     Ph 7.0 5.0 - 8.0    Glucose >=1000 (A) Negative mg/dL    Ketones Negative Negative mg/dL    Protein 30 (A) Negative mg/dL    Bilirubin Negative Negative    Urobilinogen, Urine 0.2 Negative    Nitrite Negative Negative    Leukocyte Esterase Negative Negative    Occult Blood Negative Negative    Micro Urine Req Microscopic    URINE MICROSCOPIC (W/UA)    Collection Time: 12/16/19  5:41 AM   Result Value Ref Range    WBC 0-2 (A) /hpf    RBC 0-2 (A) /hpf    Bacteria Negative None /hpf    Epithelial Cells Negative /hpf    Hyaline Cast 0-2 /lpf   ACCU-CHEK GLUCOSE    Collection Time: 12/16/19  7:56 AM   Result Value Ref Range    Glucose - Accu-Ck 219 (H) 65 - 99 mg/dL   TROPONIN    Collection Time: 12/16/19  8:30 AM   Result Value Ref Range    Troponin T 44 (H) 6 - 19 ng/L   EC-ECHOCARDIOGRAM COMPLETE W/ CONT    Collection Time: 12/16/19  9:56 AM   Result Value Ref Range    Eject.Frac. MOD BP 46.9     Eject.Frac. MOD 4C 50.71     Eject.Frac. MOD 2C 45.14        Imaging:  CT-CTA CHEST PULMONARY ARTERY W/ RECONS   Final Result      1.  No evidence of pulmonary embolus.      2.  Patchy bilateral infiltrates and small bilateral pleural effusions.      3.  Atherosclerotic vascular calcification.      EC-ECHOCARDIOGRAM COMPLETE W/ CONT   Final Result      US-EXTREMITY VENOUS LOWER BILAT   Final Result      DX-FOOT-COMPLETE 3+ LEFT   Final Result         1.  Fracture at the neck of the third toe proximal phalanx   2.  Fracture through the base of the second toe proximal phalanx with new osteolysis, appears likely related to pathologic fracture from suspected osteomyelitis.   3.  Osteolytic at the first metatarsophalangeal joint, increased since prior study, appearance favors progressive osteomyelitis.   4.  New osteolytic changes at the first tarsometatarsal joint, appearance most compatible with osteomyelitis.   5.  New areas of osteolysis involving the second, third, and fifth metatarsal heads, concerning for osteomyelitis   6.   Atherosclerosis      DX-CHEST-PORTABLE (1 VIEW)   Final Result         1.  Hazy and reticular bilateral lung base opacities, greater on the right, appearance suggests atelectasis and/or right lung base infiltrate.   2.  Trace right pleural effusion      CL-LEFT HEART CATHETERIZATION WITH POSSIBLE INTERVENTION    (Results Pending)     EKG : personally reviewed by me sinus 99, poor R wave prog, TWI - new changes compared to 8/22/19    TTE 12/16/19  CONCLUSIONS  Severely reduced left ventricular systolic function. Left ventricular   ejection fraction is visually estimated to be 30-35%.   There appears to be a linear echodensity which is adjacent to the LV   apex concerning for possible small LV thrombus. May consider repeat   limted echo in 1-2 days for re-evaluation if clinically indicated.    Normal inferior vena cava size and inspiratory collapse.  Indeterminate diastolic function.  Aortic sclerosis without stenosis.  Dr. Segovia is aware of the above findings.      Active Problems:    * No active hospital problems. *  Resolved Problems:    * No resolved hospital problems. *      Assessment / Plan:    54 year old man with PMH DM2, HTN, PAD s/p angioplasty presents with toe gangrene and found osteomyelitis. Consult for preop evaluation given cardiac marker elevation and EKG changes. On workup, found new HFrEF with WMA in LAD distribution and possible LV thrombus.    -check diagnostic LHC today  -cont antiplatelets unless interfere with surgical plans  -hep gtt for 48h  -repeat limited TTE in 2 days to eval for LV thrombus with definity echo contrast - - if still indeterminate will need cardiac MRI  -with respect to surgery, he is not yet optimized and pending LHC as well as medical optimization. However, we plan to sustain through any surgical plans. The medical and surgical team should still aim to cure the osteomyelitis as necessary. If possible, anesthesia should attempt to avoid general sedation. Timing and  type of intervention pending results and further conversation with med/surg teams.  -please start coreg 3.125mg bid  -please start valsartan 40mg bid - - will aim to titrate up as tolerated for easy conversion to outpatient entresto  -tomorrow will start spironolactone 25mg daily    I personally discussed his case with  Dr Dali M.D.    It is my pleasure to participate in the care of Mr. Aviles.  Please do not hesitate to contact me with questions or concerns.    Mahesh Segovia MD  Cardiologist [facility]

## 2019-12-16 NOTE — PROGRESS NOTES
[facility] PROGRESS NOTE     Attending: Dr. Dali WEBER  Resident:  Teresa Canas ( PGY-1)  PATIENT: Israel Aviles; [id number]; 1965    ID: 54 y.o. male admitted for osteomyelitis of the left metatarsophalangeal joint, second, thirds and fifth metatarsal and elevated cardiac enzymes with EKG changes    SUBJECTIVE: Patient resting comfortably in bed this afternoon. Had just gotten back from angiogram. Patient denying any chest pain, palpitations, abdominal pain, nausea, vomiting.     OBJECTIVE:     Vitals:    12/16/19 1031 12/16/19 1101 12/16/19 1131 12/16/19 1229   BP: 123/76 127/78 118/74 118/73   Pulse: 95 95 94 97   Resp: 18 17 20 16   Temp:    36.8 °C (98.3 °F)   TempSrc:    Temporal   SpO2: 95% 94% 93% 96%   Weight:       Height:           Intake/Output Summary (Last 24 hours) at 12/16/2019 1455  Last data filed at 12/16/2019 0630  Gross per 24 hour   Intake 100 ml   Output --   Net 100 ml       PE:  General: No acute distress, resting comfortably in bed.  HEENT: NC/AT. PERRLA. EOMI. MMM  Cardiovascular: RRR with no M/R/G.  Respiratory: Symmetrical chest. CTAB with no adventitious breath sounds   Abdomen: soft, NT/ND, no masses, +BS   Neuro: sensation grossly intact to B/L UE and B/L LE, peripheral neuropathy to bilateral feet, moving all four extremities  MSK: Dry gangrene present on left second and third toes. No extending erythema or drainage. Open wounds present on left medial and anterior foot. Healing wound present on left lateral foot.     LABS:  Recent Labs     12/16/19  0332   WBC 8.7   RBC 3.80*   HEMOGLOBIN 10.1*   HEMATOCRIT 32.8*   MCV 86.3   MCH 26.6*   RDW 51.4*   PLATELETCT 140*   MPV 10.0   NEUTSPOLYS 81.60*   LYMPHOCYTES 12.30*   MONOCYTES 5.20   EOSINOPHILS 0.00   BASOPHILS 0.00   RBCMORPHOLO Present     Recent Labs     12/16/19  0332   SODIUM 125*   POTASSIUM 4.0   CHLORIDE 91*   CO2 29   BUN 23*   CREATININE 0.88   CALCIUM 8.4*   MAGNESIUM 1.7   PHOSPHORUS 2.2*   ALBUMIN  2.9*     Estimated GFR/CRCL = Estimated Creatinine Clearance: 101.5 mL/min (by C-G formula based on SCr of 0.88 mg/dL).  Recent Labs     12/16/19 0332 12/16/19  0756   GLUCOSE 366*  --    POCGLUCOSE  --  219*     Recent Labs     12/16/19  0332   ASTSGOT 43   ALTSGPT 48   TBILIRUBIN 0.4   ALKPHOSPHAT 85   GLOBULIN 5.2*             No results for input(s): INR, APTT, FIBRINOGEN in the last 72 hours.    Invalid input(s): DIMER      IMAGING:   CL-LEFT HEART CATHETERIZATION WITH POSSIBLE INTERVENTION   Final Result      CT-CTA CHEST PULMONARY ARTERY W/ RECONS   Final Result      1.  No evidence of pulmonary embolus.      2.  Patchy bilateral infiltrates and small bilateral pleural effusions.      3.  Atherosclerotic vascular calcification.      EC-ECHOCARDIOGRAM COMPLETE W/ CONT   Final Result      US-EXTREMITY VENOUS LOWER BILAT   Final Result      DX-FOOT-COMPLETE 3+ LEFT   Final Result         1.  Fracture at the neck of the third toe proximal phalanx   2.  Fracture through the base of the second toe proximal phalanx with new osteolysis, appears likely related to pathologic fracture from suspected osteomyelitis.   3.  Osteolytic at the first metatarsophalangeal joint, increased since prior study, appearance favors progressive osteomyelitis.   4.  New osteolytic changes at the first tarsometatarsal joint, appearance most compatible with osteomyelitis.   5.  New areas of osteolysis involving the second, third, and fifth metatarsal heads, concerning for osteomyelitis   6.  Atherosclerosis      DX-CHEST-PORTABLE (1 VIEW)   Final Result         1.  Hazy and reticular bilateral lung base opacities, greater on the right, appearance suggests atelectasis and/or right lung base infiltrate.   2.  Trace right pleural effusion            MEDS:  Current medications reviewed     ASSESSMENT/PLAN:   53 yo M admitted this AM with a PMHx sig for stroke and type 2 diabetes admitted for worsening osteomyelitis of left metatarsophalangeal  joint, second, thirds and fifth metatarsal and elevated cardiac enzymes with EKG changes    #Osteomyletis - left metatarsophalangeal joint, second, thirds and fifth metatarsal   #Diabetic Foot Ulcers  - Patient s/p IV antibiotics while in [place], Daptomycin and Meropenem   - L foot XRAY on admission showing fractures and osteolytic changes secondary to suspected osteomylitis   - Lab workup showing normal lactic acid of 1.8. No leukocytosis seen  - CRP and sed rate elevated as excepted   - ID consulted, appreciate any recommendations  - Wound care and limb preservation services consulted  - Vascular surgery consulted and recommended amputation of L foot  - Ortho surgery consulted and recommended amputation of 2nd and 3rd toes with wound debridement. Recommended heel weightbearing to left lower extremity    Plan:  Continue Unasyn 3g TID  Patient planned for amputation with Ortho once cleared by cardiology   Will need to be kept NPO for possible intervention tomorrow  Wounds dressed per wound consult and RN   Follow up on recommendations by ID consult         #Acute Dyspnea  - Recent long travel to [place], not on any anticoagulation  - PE vs CHF with pulm edema  - CXR showing b/l hazy lung infiltrates  - proBNP elevated at 6008  - CTA today showing no evidence of PE  - ECHO done today showing severely reduced LVF of 30-35% with concern for small LV thrombus. Aortic sclerosis present  - Cardiology consulted and recommended starting Heparin gtt, Coreg, Valsartan with plans to start Sprironolactone tomrrow.     Plan:   Continue to follow Cardiology recommendations  Per cardio, repeate TTE in 2 days for evaluation of LV thrombus, if still indeterminate will need cardiac MRI   Continue Coreg 3.125 mg BID  Continue Valsartan 40 mg BID  Will start Spironolactone 25 mg daily tomorrow  Monitor daily labs and vitals   Heparin drip dosed per pharmacy     #Chest pain   #Elevated cardiac enzymes with EKG changes  - Elevated Troponin  T of 64 on admission down trending to 44 on r/p  - Cardiology reviewed EKG showing sinus rhythm with poor R wave progression, TWI  - Diagnostic left heart cath today showing multiple blockages. No interventions done   - Cards recommending avoiding general anaesthesia at this time    Plan:   Admit to telemetry   Continue to follow recommendations from Cardiology   Patient started on Heparin Drip dosed per pharmacy       #Type 2 Diabetes  -Patient on metformin and glipizide at baseline  -Unclear of how controlled patient is   -Last A1C in 8/22 was 7.7    Plan   D/C metformin and glipizide  Paitent started on low dose SSI   Hypoglycemia protocol intitated  Dietary consulted for diabetic education  Follow up r/p A1C in AM         #Hyponatremia  - Na of 125 on admission with a BG of 336   - Corrected Na of 131  - 500 cc NS bolus given     Plan  - Repeat BMP in AM   - Will replete as needed        Core Measures   VTE PPx: heparin drip  Abx: Unasyn    Diet: NPO   Fluids: No IVFs  Lines and Tube: PIV   Code Status: Full code           Teresa Canas M.D.   PGY-1  [facility]   [phone]

## 2019-12-17 LAB
ALBUMIN SERPL BCP-MCNC: 2.7 G/DL (ref 3.2–4.9)
ANION GAP SERPL CALC-SCNC: 10 MMOL/L (ref 0–11.9)
APTT PPP: 102 SEC (ref 24.7–36)
APTT PPP: 147.2 SEC (ref 24.7–36)
APTT PPP: 65.6 SEC (ref 24.7–36)
APTT PPP: 83.1 SEC (ref 24.7–36)
BASOPHILS # BLD AUTO: 0.4 % (ref 0–1.8)
BASOPHILS # BLD: 0.03 K/UL (ref 0–0.12)
BUN SERPL-MCNC: 17 MG/DL (ref 8–22)
CALCIUM SERPL-MCNC: 7.8 MG/DL (ref 8.5–10.5)
CHLORIDE SERPL-SCNC: 97 MMOL/L (ref 96–112)
CO2 SERPL-SCNC: 26 MMOL/L (ref 20–33)
CREAT SERPL-MCNC: 0.64 MG/DL (ref 0.5–1.4)
EOSINOPHIL # BLD AUTO: 0.04 K/UL (ref 0–0.51)
EOSINOPHIL NFR BLD: 0.5 % (ref 0–6.9)
ERYTHROCYTE [DISTWIDTH] IN BLOOD BY AUTOMATED COUNT: 50.3 FL (ref 35.9–50)
EST. AVERAGE GLUCOSE BLD GHB EST-MCNC: 197 MG/DL
GLUCOSE BLD-MCNC: 178 MG/DL (ref 65–99)
GLUCOSE BLD-MCNC: 265 MG/DL (ref 65–99)
GLUCOSE SERPL-MCNC: 258 MG/DL (ref 65–99)
HBA1C MFR BLD: 8.5 % (ref 0–5.6)
HCT VFR BLD AUTO: 35.3 % (ref 42–52)
HGB BLD-MCNC: 11.3 G/DL (ref 14–18)
IMM GRANULOCYTES # BLD AUTO: 0.03 K/UL (ref 0–0.11)
IMM GRANULOCYTES NFR BLD AUTO: 0.4 % (ref 0–0.9)
LYMPHOCYTES # BLD AUTO: 1.19 K/UL (ref 1–4.8)
LYMPHOCYTES NFR BLD: 16.2 % (ref 22–41)
MAGNESIUM SERPL-MCNC: 1.6 MG/DL (ref 1.5–2.5)
MCH RBC QN AUTO: 27.2 PG (ref 27–33)
MCHC RBC AUTO-ENTMCNC: 32 G/DL (ref 33.7–35.3)
MCV RBC AUTO: 84.9 FL (ref 81.4–97.8)
MONOCYTES # BLD AUTO: 0.46 K/UL (ref 0–0.85)
MONOCYTES NFR BLD AUTO: 6.3 % (ref 0–13.4)
NEUTROPHILS # BLD AUTO: 5.59 K/UL (ref 1.82–7.42)
NEUTROPHILS NFR BLD: 76.2 % (ref 44–72)
NRBC # BLD AUTO: 0 K/UL
NRBC BLD-RTO: 0 /100 WBC
PLATELET # BLD AUTO: 122 K/UL (ref 164–446)
PMV BLD AUTO: 10.4 FL (ref 9–12.9)
POTASSIUM SERPL-SCNC: 4.1 MMOL/L (ref 3.6–5.5)
RBC # BLD AUTO: 4.16 M/UL (ref 4.7–6.1)
SODIUM SERPL-SCNC: 133 MMOL/L (ref 135–145)
UNCODED TEST RESULT 95040: ABNORMAL
WBC # BLD AUTO: 7.3 K/UL (ref 4.8–10.8)

## 2019-12-17 PROCEDURE — 80048 BASIC METABOLIC PNL TOTAL CA: CPT

## 2019-12-17 PROCEDURE — 82040 ASSAY OF SERUM ALBUMIN: CPT

## 2019-12-17 PROCEDURE — 82962 GLUCOSE BLOOD TEST: CPT | Mod: 91

## 2019-12-17 PROCEDURE — 85025 COMPLETE CBC W/AUTO DIFF WBC: CPT

## 2019-12-17 PROCEDURE — 83036 HEMOGLOBIN GLYCOSYLATED A1C: CPT

## 2019-12-17 PROCEDURE — 770020 HCHG ROOM/CARE - TELE (206)

## 2019-12-17 PROCEDURE — A9270 NON-COVERED ITEM OR SERVICE: HCPCS | Performed by: INTERNAL MEDICINE

## 2019-12-17 PROCEDURE — A9270 NON-COVERED ITEM OR SERVICE: HCPCS | Performed by: STUDENT IN AN ORGANIZED HEALTH CARE EDUCATION/TRAINING PROGRAM

## 2019-12-17 PROCEDURE — 99233 SBSQ HOSP IP/OBS HIGH 50: CPT | Performed by: INTERNAL MEDICINE

## 2019-12-17 PROCEDURE — 83735 ASSAY OF MAGNESIUM: CPT

## 2019-12-17 PROCEDURE — 85730 THROMBOPLASTIN TIME PARTIAL: CPT | Mod: 91

## 2019-12-17 PROCEDURE — 700102 HCHG RX REV CODE 250 W/ 637 OVERRIDE(OP): Performed by: STUDENT IN AN ORGANIZED HEALTH CARE EDUCATION/TRAINING PROGRAM

## 2019-12-17 PROCEDURE — 36415 COLL VENOUS BLD VENIPUNCTURE: CPT

## 2019-12-17 PROCEDURE — 700111 HCHG RX REV CODE 636 W/ 250 OVERRIDE (IP): Performed by: STUDENT IN AN ORGANIZED HEALTH CARE EDUCATION/TRAINING PROGRAM

## 2019-12-17 PROCEDURE — 700105 HCHG RX REV CODE 258: Performed by: INTERNAL MEDICINE

## 2019-12-17 PROCEDURE — 700105 HCHG RX REV CODE 258: Performed by: STUDENT IN AN ORGANIZED HEALTH CARE EDUCATION/TRAINING PROGRAM

## 2019-12-17 PROCEDURE — 700102 HCHG RX REV CODE 250 W/ 637 OVERRIDE(OP): Performed by: INTERNAL MEDICINE

## 2019-12-17 RX ORDER — HEPARIN SODIUM 1000 [USP'U]/ML
3200 INJECTION, SOLUTION INTRAVENOUS; SUBCUTANEOUS PRN
Status: DISCONTINUED | OUTPATIENT
Start: 2019-12-17 | End: 2019-12-18

## 2019-12-17 RX ORDER — VALSARTAN 80 MG/1
80 TABLET ORAL TWICE DAILY
Status: DISCONTINUED | OUTPATIENT
Start: 2019-12-17 | End: 2019-12-25

## 2019-12-17 RX ORDER — CARVEDILOL 6.25 MG/1
6.25 TABLET ORAL 2 TIMES DAILY WITH MEALS
Status: DISCONTINUED | OUTPATIENT
Start: 2019-12-17 | End: 2019-12-18

## 2019-12-17 RX ORDER — INSULIN GLARGINE 100 [IU]/ML
0.2 INJECTION, SOLUTION SUBCUTANEOUS EVERY EVENING
Status: DISCONTINUED | OUTPATIENT
Start: 2019-12-18 | End: 2019-12-17

## 2019-12-17 RX ORDER — MAGNESIUM SULFATE HEPTAHYDRATE 40 MG/ML
4 INJECTION, SOLUTION INTRAVENOUS ONCE
Status: COMPLETED | OUTPATIENT
Start: 2019-12-17 | End: 2019-12-17

## 2019-12-17 RX ORDER — SODIUM CHLORIDE 9 MG/ML
INJECTION, SOLUTION INTRAVENOUS CONTINUOUS
Status: DISCONTINUED | OUTPATIENT
Start: 2019-12-17 | End: 2019-12-17

## 2019-12-17 RX ORDER — HEPARIN SODIUM 5000 [USP'U]/100ML
INJECTION, SOLUTION INTRAVENOUS CONTINUOUS
Status: DISCONTINUED | OUTPATIENT
Start: 2019-12-17 | End: 2019-12-18

## 2019-12-17 RX ORDER — INSULIN GLARGINE 100 [IU]/ML
0.2 INJECTION, SOLUTION SUBCUTANEOUS EVERY EVENING
Status: DISCONTINUED | OUTPATIENT
Start: 2019-12-18 | End: 2019-12-18

## 2019-12-17 RX ORDER — SPIRONOLACTONE 25 MG/1
25 TABLET ORAL
Status: DISCONTINUED | OUTPATIENT
Start: 2019-12-17 | End: 2019-12-27 | Stop reason: HOSPADM

## 2019-12-17 RX ADMIN — AMPICILLIN SODIUM AND SULBACTAM SODIUM 3 G: 2; 1 INJECTION, POWDER, FOR SOLUTION INTRAMUSCULAR; INTRAVENOUS at 18:34

## 2019-12-17 RX ADMIN — ASPIRIN 81 MG: 81 TABLET, COATED ORAL at 05:33

## 2019-12-17 RX ADMIN — CARVEDILOL 3.12 MG: 3.12 TABLET, FILM COATED ORAL at 05:33

## 2019-12-17 RX ADMIN — CARVEDILOL 6.25 MG: 6.25 TABLET, FILM COATED ORAL at 18:36

## 2019-12-17 RX ADMIN — AMPICILLIN SODIUM AND SULBACTAM SODIUM 3 G: 2; 1 INJECTION, POWDER, FOR SOLUTION INTRAMUSCULAR; INTRAVENOUS at 12:06

## 2019-12-17 RX ADMIN — AMPICILLIN SODIUM AND SULBACTAM SODIUM 3 G: 2; 1 INJECTION, POWDER, FOR SOLUTION INTRAMUSCULAR; INTRAVENOUS at 05:32

## 2019-12-17 RX ADMIN — VALSARTAN 80 MG: 80 TABLET, FILM COATED ORAL at 18:35

## 2019-12-17 RX ADMIN — LINEZOLID 600 MG: 600 TABLET, FILM COATED ORAL at 18:34

## 2019-12-17 RX ADMIN — INSULIN LISPRO 1 UNITS: 100 INJECTION, SOLUTION INTRAVENOUS; SUBCUTANEOUS at 05:40

## 2019-12-17 RX ADMIN — INSULIN LISPRO 3 UNITS: 100 INJECTION, SOLUTION INTRAVENOUS; SUBCUTANEOUS at 12:09

## 2019-12-17 RX ADMIN — LINEZOLID 600 MG: 600 TABLET, FILM COATED ORAL at 05:33

## 2019-12-17 RX ADMIN — MAGNESIUM SULFATE IN WATER 4 G: 40 INJECTION, SOLUTION INTRAVENOUS at 07:14

## 2019-12-17 RX ADMIN — ATORVASTATIN CALCIUM 40 MG: 40 TABLET, FILM COATED ORAL at 21:16

## 2019-12-17 RX ADMIN — VALSARTAN 40 MG: 80 TABLET, FILM COATED ORAL at 05:33

## 2019-12-17 RX ADMIN — AMPICILLIN SODIUM AND SULBACTAM SODIUM 3 G: 2; 1 INJECTION, POWDER, FOR SOLUTION INTRAMUSCULAR; INTRAVENOUS at 23:40

## 2019-12-17 RX ADMIN — INSULIN GLARGINE 15 UNITS: 100 INJECTION, SOLUTION SUBCUTANEOUS at 18:33

## 2019-12-17 RX ADMIN — INSULIN LISPRO 5 UNITS: 100 INJECTION, SOLUTION INTRAVENOUS; SUBCUTANEOUS at 18:33

## 2019-12-17 RX ADMIN — CLOPIDOGREL BISULFATE 75 MG: 75 TABLET ORAL at 21:16

## 2019-12-17 RX ADMIN — HEPARIN SODIUM 1050 UNITS/HR: 5000 INJECTION, SOLUTION INTRAVENOUS at 16:37

## 2019-12-17 RX ADMIN — SODIUM CHLORIDE: 9 INJECTION, SOLUTION INTRAVENOUS at 02:20

## 2019-12-17 RX ADMIN — INSULIN LISPRO 2 UNITS: 100 INJECTION, SOLUTION INTRAVENOUS; SUBCUTANEOUS at 21:32

## 2019-12-17 ASSESSMENT — COGNITIVE AND FUNCTIONAL STATUS - GENERAL
MOVING FROM LYING ON BACK TO SITTING ON SIDE OF FLAT BED: A LITTLE
STANDING UP FROM CHAIR USING ARMS: A LITTLE
MOVING TO AND FROM BED TO CHAIR: A LITTLE
TURNING FROM BACK TO SIDE WHILE IN FLAT BAD: A LITTLE
WALKING IN HOSPITAL ROOM: A LITTLE
DAILY ACTIVITIY SCORE: 24
SUGGESTED CMS G CODE MODIFIER MOBILITY: CK
CLIMB 3 TO 5 STEPS WITH RAILING: A LITTLE
MOBILITY SCORE: 18
SUGGESTED CMS G CODE MODIFIER DAILY ACTIVITY: CH

## 2019-12-17 ASSESSMENT — ENCOUNTER SYMPTOMS
CONFUSION: 0
NUMBNESS: 0
SHORTNESS OF BREATH: 0
ABDOMINAL PAIN: 0
DIAPHORESIS: 0
DIZZINESS: 0
FATIGUE: 1
CHEST TIGHTNESS: 0
AGITATION: 0
WOUND: 1
COLOR CHANGE: 0
TROUBLE SWALLOWING: 0
PALPITATIONS: 0
CHILLS: 0
HEMOPTYSIS: 0
COUGH: 0
FEVER: 0
BLOOD IN STOOL: 0
NERVOUS/ANXIOUS: 0
ABDOMINAL DISTENTION: 0
SPUTUM PRODUCTION: 0

## 2019-12-17 ASSESSMENT — LIFESTYLE VARIABLES
ALCOHOL_USE: NO
EVER FELT BAD OR GUILTY ABOUT YOUR DRINKING: NO
HAVE PEOPLE ANNOYED YOU BY CRITICIZING YOUR DRINKING: NO
HAVE YOU EVER FELT YOU SHOULD CUT DOWN ON YOUR DRINKING: NO
DOES PATIENT WANT TO STOP DRINKING: NO
TOTAL SCORE: 0
CONSUMPTION TOTAL: NEGATIVE
ON A TYPICAL DAY WHEN YOU DRINK ALCOHOL HOW MANY DRINKS DO YOU HAVE: 0
AVERAGE NUMBER OF DAYS PER WEEK YOU HAVE A DRINK CONTAINING ALCOHOL: 0
EVER_SMOKED: NEVER
HOW MANY TIMES IN THE PAST YEAR HAVE YOU HAD 5 OR MORE DRINKS IN A DAY: 0
EVER HAD A DRINK FIRST THING IN THE MORNING TO STEADY YOUR NERVES TO GET RID OF A HANGOVER: NO
TOTAL SCORE: 0
TOTAL SCORE: 0

## 2019-12-17 NOTE — PROGRESS NOTES
After discussing case with cardiology and orthopedics it was decided to postpone BKA to medically optimize him from a cardiac standpoint.    Plan:  Start spironolactone 25 mg daily  Titrate Coreg and valsartan as tolerated  Continue heparin GTT, repeat limited echo with contrast tomorrow to evaluate for left ventricle thrombus.  Will DC if echo normal.  Ortho will sign off, will contact limb preservation when ready for surgery.  Plan for CABG after recovery from BKA.  Continue unasyn and zyvox  ID and cardiology will continue to follow.    Patient and wife were update on treatment plan.

## 2019-12-17 NOTE — CONSULTS
"DATE OF SERVICE:  12/16/2019    INFECTIOUS DISEASE CONSULTATION    REASON FOR CONSULT:  Left lower extremity gangrene, LLE ulceration, and cellulitis.    CONSULTING PHYSICIAN:  Jan Mims MD    HISTORY OF PRESENT ILLNESS.  Please note majority of the history is obtained   from the medical records as patient is obtunded after a cardiac   catheterization.  This is a 54-year-old male who initially came to the ER this   morning with complaints of increasing weakness.  He had been weak for months,   but this got particularly worse over the last 3-4 weeks.  He came directly to   the hospital from [place] [facility] approximately 3 hours   prior to his admission.  He states he was hospitalized there for about 3   weeks.  He has recurrent and worsening chronic ulcerations. He had been revasculized but did not follow up with wound care.  He had been recommended for a prior amputation, but he had refused. The second and third digits on his   left foot are now black and frankly gangrenous.  Patient is unable to state   exactly how long they have had that appearance.  He was on Zyvox and imipenem for 5 days  while he was [facility].   It is unclear what was cultured while he was [facility].  He was   started on Unasyn in the ED as his prior cultures here,  in   08/2019 showed methicillin-sensitive Staph aureus and Enterococcus faecalis.    Infectious disease is consulted for antibiotic recommendations and management.   He was previously treated with a prolonged course of daptomycin as he had refused a BKA.  He completed his daptomycin on   10/02/2019.  He was seen in followup in September, but failed to follow up   after that.  He has been seen and evaluated by orthopedics this admission.  He attributes his   wounds to \"ill-fitting shoes.\"  The pain in his foot was described as 4/10   and achy in character, worse with walking, better with rest and not moving it.    Patient is currently status post cardiac " catheterization as on admission he   was noted to have a marked elevation in his troponins as well as BNP   suggestive of acute heart failure and myocardial ischemia.  Multiple blood   cultures were done.     Per review of the record, it sounds like he was treated for pneumonia while   he was in [place] for 5 days.  His shortness of breath started after he was en   route home on the airplane.  It was associated with dry cough.  In the ED, he   was noted to be tachycardic, mildly tachypneic, hyponatremic with a markedly   elevated glucose and had again evidence of a demand ischemia.  Infectious Diseases consulted for antibiotic recommendation and management    REVIEW OF SYSTEMS:  Positive for the foot pain.  Otherwise, all other systems   are reviewed and are negative. Limited due to mental status    ALLERGIES:  He has no known drug allergies.    PAST MEDICAL HISTORY:  Diabetes, hyperlipidemia.    FAMILY HISTORY:  Diabetes.    SOCIAL HISTORY:  Recent travel and hospitalization in [place].  He is a   nonsmoker.  Denies alcohol or illicit drug use.    PHYSICAL EXAMINATION:  GENERAL:  He is chronically ill-appearing male in no acute distress, looks   older than his stated age.  VITAL SIGNS:  He is afebrile, temperature 98.3, blood pressure 118/73, pulse   97, respiratory rate 16, oxygen saturation 96% on room air.  He weighs 75 kg.  HEENT:  Normocephalic, atraumatic.  Pupils equal, round, reactive to light.    He has anicteric sclerae.  He has no conjunctivitis.  Pharynx is clear.  NECK:  Supple.  There is no JVD or stridor.  No lymphadenopathy.  CARDIOVASCULAR:  Tachycardic.  Regular rate and rhythm.  Unable to auscultate   murmurs, rubs or gallops.  CHEST:  Clear to auscultation bilaterally, unlabored.  There is no wheezing or   rhonchi.  ABDOMEN:  Soft, nontender, nondistended.  There is no rebound or guarding.  EXTREMITIES:  Show gangrene of the left second and third digits.  He has   diffuse edema affecting his entire  left foot with erythema extending to the   lower tibia.  He has multiple ulcerations of the foot, particularly affecting   the medial first digit.  NEUROLOGIC:  He is again somnolent post cardiac cath, but awakens, answers   some questions, follows commands.  PSYCHIATRIC:  Affect is flat.  Mood is normal.    CURRENT LABORATORY DATA:  White blood cell count of 8.7, H and H of 10.1 and   32.8, platelets of 140.  Sodium 125, potassium 4, chloride 91, bicarbonate 29,   glucose 366, BUN 23, creatinine 0.8, AST 43, ALT 48, albumin of 2.9.  Lactic   acid 1.8.  BNP of 6008.  Troponin 64.  Prior foot cultures again grew   Enterococcus faecalis, methicillin-sensitive Staph aureus.    IMAGING:  He had CT angiogram of the chest, which showed no pulmonary embolus,   patchy bilateral infiltrates and pleural effusions consistent with pulmonary   edema.  By my read, chest x-ray showed pulmonary edema.  X-ray of the foot   showed fracture of the third toe, second toe, osteolytic changes suggesting   osteomyelitis of the first metatarsophalangeal joint.  No osteolytic changes   again of the first tarsometatarsal joint as well as the second, third and   fifth metatarsal heads.    ASSESSMENT AND PLAN:  A 54-year-old poorly controlled diabetic male admitted   after returning from [facility] where he was treated for 5 days with IV antibiotics   for what sounds like pneumonia; however, upon arrival, he was noted to have a   new cough, shortness of breath and myocardial ischemia with congestive heart   failure.  He is status post cardiac catheterization.  By report, he had   multiple blockages.  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  It is associated with a medial foot   ulceration through at least to the muscle as well as cellulitis.  His blood   sugars are uncontrolled.  He has multiple electrolyte derangements.  He has   been seen and evaluated by orthopedics and radiographically  showing evidence   of progression of vascular changes consistent with osteomyelitis affecting   multiple areas of his left foot.  Again, we recommend BKA; however, patient   has previously refused versus a TMA. Vascular evaluation pending. At this point, he is recommended for   minimum amputation of his gangrenous digits, which may be a  source of sepsis.    We will add Zyvox due to his recent hospitalization in [place].  At this time,   we will continue on Unasyn as his injuries appear to be more dry rather than   wet gangrene treating primarily cellulitis, however, may need to escalate   antibiotics if indicated.  Further recommendations per culture results and   clinical course.    Thank you and we will follow with you.       ____________________________________     MD CHAZ ALMONTE / FLORECITA    DD:  12/16/2019 15:42:29  DT:  12/16/2019 18:22:57    D#:  [id number]  Job#:  [id number]

## 2019-12-17 NOTE — NON-PROVIDER
CC  Weakness and shortness of breath     HPI  Israel Avlies is a 55 y/o M with a PMHx of DM2, stroke, and diabetic L foot ulcers who was admitted yesterday for osteomyelitis of the left foot and elevated cardiac enzymes with abnormal EKG.    Last night at 7-8pm, pt had an episode of low BP at 93/39. He was given 250cc bolus and his BP recovered. This morning, he has no new complaints.     PHYSICAL EXAM  VITAL SIGNS:   BP: dropped to 93/39 at 7-8pm last night. This morning, 118/62  Tmax: 98.3F   HR:   RR: 16-20      Constitutional: No acute distress. Pt appears tired, lying in bed. He sits up in bed with effort.  HEENT: PERRL, No LAD  CV: Regular rhythm. No murmurs, rubs or gallops  Pulm: CTABL, no resp distress. No wheezes  Extremeties: Skin of left foot and ankle edematous, erythematous, and warm. Skin of ankle and foot is not tender to light palpation. Wet ulcer noted on medial dorsum of left foot. Dry gangrene noted on 2nd and 3rd digits of left foot.  Numbness of left 1st digit. Pt is able to feel inferior aspect of left foot.  Neuro: Cranial nerves grossly intact  Psych: Normal affect, judgement, insight     LABS  CBC:  WBC: 7.3  HgB: 11.3 (up from 10.3 yesterday)   HCT: 35.3 (up from 32.3 yesterday)  PLT: 122    CMP:  Na: 133 (137 corrected for hyperglycemia; corrected Na is up from 131 yesterday)  K: 4.1  Cl: 97  Bicarb: 26  BUN: 17  Cr: 0.64  Glu: 258    APTT: 102s  INR (12/16): 1.25     MICROBIOLOGY:  Blood cultures negative    IMAGING:  L Heart Angiogram  - Severe LV systolic dysfunction  - 3 vessel CAD  - Normal LVEDP  Impression:  - Ischemic Cardiomyopathy  Recommended to consider bypass surgery for revascularization non-urgently.  These results should not delay amputation.    Lung CT  - No PE found  - Small pleural effusions  - Patchy bilateral infiltrates  - Atherosclerotic vascular calcification    Echocardiogram  - Severely reduced LV systolic function: EF=30-35%  - Possible LV  thrombus  Recommended to repeat echo tomorrow for re-evaluation of thrombus       ASSESSMENT/PLAN:  Pt is a 53 y/o M w/ PMHx of DM2, stroke, and L foot ulcer admitted for osteomyelitis of his left foot. He also has elevated troponin and BNP with abnormal ECG and echocardiogram. He is afebrile and normotensive.    #Osteomyelitis  Osteomyelitis (from diabetic foot ulcer and gangrenous toes) found on pt's 1st metatarsophalangeal joint, and in his 2nd, 3rd, and 5th metatarsal heads. Cellulitis of L foot and ankle.  - Continue Unasyn 3g TID  - Continue Zyvox  - Blood cultures negative  - PAD with low arterial blood flow per Vascular Surgery. It is unlikely that the ulcer will heal, recommend BKA when pt is ready.  - Consider amputation of gangrenous toes under local anesthesia on Friday night/monday morning if cleared by cardiology  - Consider BKA before discharge. Re-evaluate Monday for cardiac readiness to undergo general anesthesia and BKA.    #Abnormal ECG  Pt has L heart dysfunction with ischemic cardiomyopathy. He also has a possible LV mural thrombus. He has not been cleared by cardiology to undergo surgery today.  - Continue heparin until Friday  - Repeat echocardiogram tomorrow.  - Continue meds per cardiology:  - Coreg 3.125mg BID  - Valsartan 40mg BID  - Start spironolactone 25mg qDaily  - Trend troponin    #DM2, uncontrolled  Pt has A1C of 8.5  - Sliding scale insulin  - Re-evaluate DM control as outpatient    #Hyponatremia  Pt had an Na of 133 with BG of 258. Corrected Na is 137.  - Recheck CMP tomorrow. Continue to monitor Na.

## 2019-12-17 NOTE — HEART FAILURE PROGRAM
Cardiovascular Nurse Navigator () Advanced Heart Failure Program Inpatient Consult Note:     Although Osteomyelitis is the patient's primary reason for admission, he has been found to have newly diagnosed, acutely decompensated HFrEF along with severe, diffuse, and obstructive MV CAD.    Patient is to be started on appropriate medications and recommendation is for foot amputation prior to revascularization of the coronary arteries.     · HFrEF (30%)  · NYHA: III  · Etiology: ischemic  · Consider for CardioMEMS commercial or GUIDE HF? Not appropriate for de dimitri HF  · Diuresis: currently none ordered, patient was on fluids which have been stopped.     Demographics:    · Insurance: [employer]  · Residence: [facility] Secondary Prevention interventions:    · Pneumococcal vaccine: PNA vaccine assessment has been removed from the nursing flowsheets. Please ask MD to assess this patient for the PNA vaccine and order if appropriate. HF is a high risk condition regardless of age.  · Influenza vaccine: needs to be addessed    HFrEF and CAD GDMT:    · AMRIT - I: valsartan  · Evidence Based BB (bisoprolol, carvedilol, or Toprol XL): carvedilol  · Aldosterone receptor antagonist: Not currently prescribed, will need to be addressed: prescribed or a provider note indicating why not prescribed, prior to discharge.  · ASA, Clopidogrel, atorva 40   · ICR referral placed on 12/17/19    Device Therapy Screening Tool     Not appropriate in de dimitri diagnosis      Daily Weights: I ordered    Please teach patient to weigh daily and write on symptom tracker.    Please assess patient's understanding of what to do if weight is out of range.    If pt does not own a working scale and cannot afford to purchase one, please provide one. Scales can be found in the Care Coordination Rooms on T[address].    I's and O's: I ordered    If we are not tracking intake and output, we don't know if diuretics are working.    This also increases the risk that the  "patient will be sent home without enough fluid off.    [facility] Plan Notes: none    Therapy Notes: none    Advanced Care Planning:  No AD on file. Full code status at the time of this note's filing.    The ACC recommends engaging palliative care as part of optimization of HFrEF treatment to solicit goals of care and focus on quality of life throughout the clinical course of HF.    Follow up appointment:   If discharged from acute care to home (exception hospice discharge), pt must have an appointment scheduled within 7 days of discharge (Cardiology, PCP, or DC Clinic).    If discharged to Transitional Care Facility (LTAC, SNF, IRH), appointment should be made about a month out for after TCF.     Bedside Nursing Education:  Please provide HF booklet and repeated, ongoing education while administering medications, weighing patient, discussing management of symptoms, diet and need to follow up and act on changes.    Bedside Nursing Discharge:  When completing the after visit summary (discharge instructions) please select \"Cardiac Diagnosis, and Heart Failure\" in the special instructions section to populate the heart failure specific discharge instructions.     Referrals/Orders Placed:    Hospital Schedulers for HF f/u?  yes  Social Work   yes  Registered Dietician  yes  [facility] CP Program for patients with Medicaid, [facility], or [facility] Plus coverage?  no    Arianna EUCEDA RN, CHFN ext. [phone] M-F        "

## 2019-12-17 NOTE — THERAPY
Physical Therapy Contact Note    PT cardiac rehab consult received and acknowledged. Discussed with RN; patient with new diagnosis of HF and pending cardiology clearance prior to ortho sx. Will hold PT eval until POC established/following ortho sx for accurate assessment of needs.    Shania Brown, PT, DPT  [phone]

## 2019-12-17 NOTE — PROGRESS NOTES
tCardiology Follow Up Progress Note    Date of Service  12/17/2019    Attending Physician  Donny Mcnally M.D.    Chief Complaint   Weakness and SOB    Cardiology consult  New HFrEF     HPI  Israel Aviles is a 54 y.o. male admitted 12/16/2019 with weakness and SOB. Presented for toe gangrene and found to have osteomyelitis.    History of diabetes, hypertension, and PAD s/p angioplasty.    Interim Events    12/18/19: patient resting in bed, denies any chest pain, SOB or dizziness. Tolerating coreg 6.25mg BID.  special precaution.     Review of Systems  Review of Systems   Constitutional: Positive for fatigue. Negative for chills, diaphoresis and fever.   HENT: Negative for nosebleeds and trouble swallowing.    Respiratory: Negative for cough, chest tightness and shortness of breath.    Cardiovascular: Negative for chest pain, palpitations and leg swelling.   Gastrointestinal: Negative for abdominal distention, abdominal pain and blood in stool.   Genitourinary: Negative for hematuria.   Skin: Positive for wound. Negative for color change.   Neurological: Negative for dizziness, syncope and numbness.   Psychiatric/Behavioral: Negative for agitation and confusion. The patient is not nervous/anxious.        Vital signs in last 24 hours  Temp:  [36.1 °C (96.9 °F)-37.3 °C (99.1 °F)] 36.1 °C (97 °F)  Pulse:  [] 99  Resp:  [15-20] 18  BP: ()/(39-80) 118/62  SpO2:  [92 %-98 %] 95 %    Physical Exam  Physical Exam  Vitals signs and nursing note reviewed.   Constitutional:       Appearance: Normal appearance.   HENT:      Head: Normocephalic and atraumatic.   Eyes:      Pupils: Pupils are equal, round, and reactive to light.   Neck:      Musculoskeletal: Normal range of motion.   Cardiovascular:      Rate and Rhythm: Normal rate and regular rhythm.      Heart sounds: Normal heart sounds. No murmur.   Pulmonary:      Effort: Pulmonary effort is normal.      Breath sounds: Normal breath sounds.    Abdominal:      General: Abdomen is flat.   Skin:     General: Skin is warm and dry.      Findings: Wound present.   Neurological:      General: No focal deficit present.      Mental Status: He is alert and oriented to person, place, and time.   Psychiatric:         Mood and Affect: Mood normal.         Behavior: Behavior normal.         Thought Content: Thought content normal.         Judgment: Judgment normal.         Lab Review  Lab Results   Component Value Date/Time    WBC 7.3 12/17/2019 12:33 AM    RBC 4.16 (L) 12/17/2019 12:33 AM    HEMOGLOBIN 11.3 (L) 12/17/2019 12:33 AM    HEMATOCRIT 35.3 (L) 12/17/2019 12:33 AM    MCV 84.9 12/17/2019 12:33 AM    MCH 27.2 12/17/2019 12:33 AM    MCHC 32.0 (L) 12/17/2019 12:33 AM    MPV 10.4 12/17/2019 12:33 AM      Lab Results   Component Value Date/Time    SODIUM 133 (L) 12/17/2019 12:33 AM    POTASSIUM 4.1 12/17/2019 12:33 AM    CHLORIDE 97 12/17/2019 12:33 AM    CO2 26 12/17/2019 12:33 AM    GLUCOSE 258 (H) 12/17/2019 12:33 AM    BUN 17 12/17/2019 12:33 AM    CREATININE 0.64 12/17/2019 12:33 AM    CREATININE 0.9 05/09/2007 01:20 AM      Lab Results   Component Value Date/Time    ASTSGOT 43 12/16/2019 03:32 AM    ALTSGPT 48 12/16/2019 03:32 AM     Lab Results   Component Value Date/Time    CHOLSTRLTOT 262 (H) 06/15/2018 10:20 PM     (H) 06/15/2018 10:20 PM    HDL 54 06/15/2018 10:20 PM    TRIGLYCERIDE 132 06/15/2018 10:20 PM    TROPONINT 44 (H) 12/16/2019 08:30 AM       Recent Labs     12/16/19  0450   NTPROBNP 6008*       Cardiac Imaging and Procedures Review  EKG:    SINUS RHYTHM   LOW VOLTAGE IN FRONTAL LEADS   BORDERLINE R WAVE PROGRESSION, ANTERIOR LEADS   ABNORMAL T, CONSIDER ISCHEMIA, ANT-LAT LEADS   PROLONGED QT INTERVAL   Compared to ECG 08/22/2019 17:35:55   Low QRS voltage now present   T-wave abnormality now present   Possible ischemia now present   Prolonged QT interval now present   Electronically Signed On 12- 4:18:19 PST by Jan Mims MD      Echocardiogram:   12/16/19  Severely reduced left ventricular systolic function. Left ventricular   ejection fraction is visually estimated to be 30-35%.   There appears to be a linear echodensity which is adjacent to the LV   apex concerning for possible small LV thrombus. May consider repeat   limted echo in 1-2 days for re-evaluation if clinically indicated.    Normal inferior vena cava size and inspiratory collapse.  Indeterminate diastolic function.  Aortic sclerosis without stenosis.  Dr. Segovia is aware of the above findings.    Cardiac Catheterization:   12/16/19  Conclusions    1. Severe LV systolic dysfunction by noninvasive evaluation.    2. Severe and diffuse obstructive three-vessel coronary artery disease.    3. Normal LVEDP.     Post-Procedure Diagnosis    Ischemic cardiomyopathy.     Recommendations    * Medical therapy of coronary artery disease and ischemic cardiomyopathy.  Consider nonurgent and staged revascularization, preferably bypass surgery.    * Would be reasonable to proceed with planned amputation of the foot prior  to coronary artery revascularization.  If this path is elected it would be at  increased risk of perioperative cardiovascular complications and I would  recommend perioperative antiplatelet medication statin therapy beta-blockade  and close attention to avoid hemodynamic stressors for excess blood loss/fluid  Shifts.      CTA chest   12/16/19     1.  No evidence of pulmonary embolus.     2.  Patchy bilateral infiltrates and small bilateral pleural effusions.     3.  Atherosclerotic vascular calcification.    Assessment/Plan  No new Assessment & Plan notes have been filed under this hospital service since the last note was generated.  Service: Cardiology    1. Osteomyelitis from toe gangrene:  - ID and surgery following   - IV abx     2. HFrEF:  - cath for diagnostic showed MVCAD, reasonable to proceed with planned amputation of the foot prior to coronary artery  "revascularization. \"with respect to surgery, he is not yet optimized. Would appreciate titrating for another day or two. If acceptable to surgery would plan for Friday OR with local anesthesia.\" per Dr. Aragon   - repeat LE ALYSSA/ TBI postop   - ECHO showed ef 30-35% with linear echodensity which is adjacent to the LV apex concering for possible small LV thrombus.   - repeat echo in 2 days   - continue heparin gtt   - increased coreg 12.5mg BID   - continue valsartan 40mg BID, conversion to entresto as outpatient   - continue aldactone 25mg qd     3. Ischemic cardiomyopathy:   - continue asa, plavix, coreg 12.5mg BID and atorvastatin 40mg qd     4. Anemia:  - hgb today is 9.9, from 11.3 yesterday. Repeat hbg at noon  - denies any melena or hemotchezia       Thank you for allowing me to participate in the care of this patient.  I will continue to follow this patient    Please contact me with any questions.    YAMILKA Ordaz   [facility]        "

## 2019-12-17 NOTE — PROGRESS NOTES
[facility] notified of low BP. Dr. Elizabeth to order a 250 mL bolus.

## 2019-12-17 NOTE — PROGRESS NOTES
Report received from KEENA Waterman. Assumed care of patient. Updated patient on plan of care. Fall precautions in place, call light within reach.

## 2019-12-17 NOTE — PROGRESS NOTES
Assumed care of pt, bedside report received from KEENA Shaver. Call light within reach. Heparin gtt verified with Chhaya. Addressed POC with pt and wife who is at bedside, no additional questions at this time.

## 2019-12-17 NOTE — PROGRESS NOTES
CC:   Chief Complaint   Patient presents with   • Weakness   • Shortness of Breath       HPI:      54 year old man with PMH DM2, HTN, PAD s/p angioplasty presents with foot/toe gangrene and found osteomyelitis. Consult for preop evaluation given cardiac marker elevation and EKG changes. At presentation was without symptoms. He described an exercise tolerance that was >4 METS. During workup, patient was found to have  HFrEF 30-35% and obstructive multivessel CAD. Undergoing titration OMT.    Medications / Drug list prior to admission:  No current facility-administered medications on file prior to encounter.      Current Outpatient Medications on File Prior to Encounter   Medication Sig Dispense Refill   • insulin glargine (LANTUS) 100 UNIT/ML Solution Inject 6 Units as instructed every evening as needed. Uses only if above 150      • atorvastatin (LIPITOR) 40 MG Tab Take 40 mg by mouth every evening.     • metformin (GLUCOPHAGE) 1000 MG tablet Take 1,000 mg by mouth 2 times a day, with meals.     • clopidogrel (PLAVIX) 75 MG Tab Take 75 mg by mouth every bedtime.  2   • glipiZIDE (GLUCOTROL) 5 MG Tab Take 5 mg by mouth 2 times a day.     • aspirin 81 MG EC tablet Take 1 Tab by mouth every day. 30 Tab 3       Current list of administered Medications:    Current Facility-Administered Medications:   •  NS infusion, , Intravenous, Continuous, Keshawn Becker M.D., Stopped at 12/17/19 0626  •  magnesium sulfate IVPB premix 4 g, 4 g, Intravenous, Once, Teresa Canas M.D., Last Rate: 25 mL/hr at 12/17/19 0714, 4 g at 12/17/19 0714  •  influenza vaccine quad injection 0.5 mL, 0.5 mL, Intramuscular, Once PRN, Donny Mcnlaly M.D.  •  senna-docusate (PERICOLACE or SENOKOT S) 8.6-50 MG per tablet 2 Tab, 2 Tab, Oral, BID, 2 Tab at 12/16/19 0757 **AND** polyethylene glycol/lytes (MIRALAX) PACKET 1 Packet, 1 Packet, Oral, QDAY PRN **AND** magnesium hydroxide (MILK OF MAGNESIA) suspension 30 mL, 30 mL, Oral, QDAY PRN **AND**  bisacodyl (DULCOLAX) suppository 10 mg, 10 mg, Rectal, QDAY PRN, Yanni Ingram M.D.  •  Respiratory Care per Protocol, , Nebulization, Continuous RT, Yanni Ingram M.D.  •  enalaprilat (VASOTEC) injection 1.25 mg, 1.25 mg, Intravenous, Q6HRS PRN, Yanni Ingram M.D.  •  cloNIDine (CATAPRES) tablet 0.1 mg, 0.1 mg, Oral, Q6HRS PRN, Yanni Ingram M.D.  •  insulin glargine (LANTUS) injection 15 Units, 0.2 Units/kg/day, Subcutaneous, Q EVENING, Stopped at 12/16/19 1800 **AND** insulin lispro (HUMALOG) injection 1-6 Units, 1-6 Units, Subcutaneous, Q6HRS, 1 Units at 12/17/19 0540 **AND** Accu-Chek Q6 if NPO, , , Q6H **AND** NOTIFY MD and PharmD, , , Once **AND** glucose 4 g chewable tablet 16 g, 16 g, Oral, Q15 MIN PRN **AND** DEXTROSE 10% BOLUS 250 mL, 250 mL, Intravenous, Q15 MIN PRN, Yanni Ingram M.D.  •  atorvastatin (LIPITOR) tablet 40 mg, 40 mg, Oral, Nightly, Yanni Ingram M.D., 40 mg at 12/16/19 2018  •  clopidogrel (PLAVIX) tablet 75 mg, 75 mg, Oral, QHS, Yanni Ingram M.D., 75 mg at 12/16/19 2018  •  aspirin EC (ECOTRIN) tablet 81 mg, 81 mg, Oral, DAILY, Yanni Ingram M.D., 81 mg at 12/17/19 0533  •  ampicillin/sulbactam (UNASYN) 3 g in  mL IVPB, 3 g, Intravenous, Q6HRS, Nichole Morel M.D., Stopped at 12/17/19 0618  •  carvedilol (COREG) tablet 3.125 mg, 3.125 mg, Oral, BID WITH MEALS, Nichole Morel M.D., 3.125 mg at 12/17/19 0533  •  valsartan (DIOVAN) tablet 40 mg, 40 mg, Oral, TWICE DAILY, Nichole Morel M.D., 40 mg at 12/17/19 0533  •  [COMPLETED] heparin injection 6,000 Units, 6,000 Units, Intravenous, Once, 6,000 Units at 12/16/19 1810 **AND** heparin injection 3,200 Units, 3,200 Units, Intravenous, PRN **AND** heparin infusion 25,000 units in 500 mL 0.45% NACL, , Intravenous, Continuous, Last Rate: 21 mL/hr at 12/17/19 0731, 1,050 Units/hr at 12/17/19 0731 **AND** Protocol 440 Heparin Weight Based DO NOT GIVE ANY HEPARIN BOLUS TO STROKE PATIENT,  , , CONTINUOUS **AND** Protocol 440 Heparin Weight Based Discontinue Enoxaparin (Lovenox), Dabigatran (Pradaxa), Rivaroxaban (Xarelto), Apixaban (Eliquis), Edoxaban (Savaysa, Lixiana), Fondaparinux (Arixtra) and Argatroban prior to heparin administration, , , CONTINUOUS **AND** Protocol 440 Heparin Weight Based Draw baseline aPTT, PT, and platelet count if not already done, , , CONTINUOUS **AND** Protocol 440 Heparin Weight Based Draw aPTT 6 hours after beginning infusion. , , , CONTINUOUS **AND** Protocol 440 Heparin Weight Based Draw Platelet count every three days. Contact MD if platelet is 50% lower than baseline count., , , CONTINUOUS **AND** Protocol 440 Heparin Weight Based Record Patient Data, , , CONTINUOUS **AND** Protocol 440 Heparin Weight Based INSTRUCTIONS, , , CONTINUOUS **AND** Protocol 440 Heparin Weight Based Review aPTT results 6 hours after infusion is begun as detailed, , , CONTINUOUS **AND** Protocol 440 Heparin Weight Based Adjust heparin to maintain aPTT between 55-96 sec, , , CONTINUOUS **AND** Protocol 440 Heparin Weight Based Order aPTT 6 hours after any rate change or hold until aPTT is therapeutic (55-96 seconds), , , CONTINUOUS **AND** Protocol 440 Heparin Weight Based Documentation and verification, , , CONTINUOUS, Nichole Morel M.D.  •  linezolid (ZYVOX) tablet 600 mg, 600 mg, Oral, Q12HRS, Angie Gonzalez M.D., 600 mg at 12/17/19 0533    Past Medical History:   Diagnosis Date   • Diabetes (HCC)        Past Surgical History:   Procedure Laterality Date   • IRRIGATION & DEBRIDEMENT ORTHO Left 6/24/2019    Procedure: IRRIGATION AND DEBRIDEMENT, WOUND-FOOT, BIOLOGIC PLACEMENT, WOUND VAC PLACEMENT;  Surgeon: Charles Chopra M.D.;  Location: SURGERY [facility];  Service: Orthopedics       History reviewed. No pertinent family history.  Patient family history was personally reviewed, no pertinent family history to current presentation    Social History     Socioeconomic History    • Marital status:      Spouse name: Not on file   • Number of children: Not on file   • Years of education: Not on file   • Highest education level: Not on file   Occupational History   • Not on file   Social Needs   • Financial resource strain: Not on file   • Food insecurity:     Worry: Not on file     Inability: Not on file   • Transportation needs:     Medical: Not on file     Non-medical: Not on file   Tobacco Use   • Smoking status: Never Smoker   • Smokeless tobacco: Never Used   Substance and Sexual Activity   • Alcohol use: No   • Drug use: No   • Sexual activity: Not on file   Lifestyle   • Physical activity:     Days per week: Not on file     Minutes per session: Not on file   • Stress: Not on file   Relationships   • Social connections:     Talks on phone: Not on file     Gets together: Not on file     Attends Gnosticism service: Not on file     Active member of club or organization: Not on file     Attends meetings of clubs or organizations: Not on file     Relationship status: Not on file   • Intimate partner violence:     Fear of current or ex partner: Not on file     Emotionally abused: Not on file     Physically abused: Not on file     Forced sexual activity: Not on file   Other Topics Concern   • Not on file   Social History Narrative   • Not on file       ALLERGIES:  No Known Allergies    Review of systems:  A complete review of symptoms was reviewed with patient. This is reviewed in H&P and PMH. ALL OTHERS reviewed and negative    Physical exam:  Patient Vitals for the past 24 hrs:   BP Temp Temp src Pulse Resp SpO2 Height Weight   12/16/19 1131 118/74 -- -- 94 20 93 % -- --   12/16/19 1101 127/78 -- -- 95 17 94 % -- --   12/16/19 1031 123/76 -- -- 95 18 95 % -- --   12/16/19 1001 124/69 -- -- 96 20 98 % -- --   12/16/19 0901 121/76 -- -- 96 16 94 % -- --   12/16/19 0801 124/80 -- -- 95 16 94 % -- --   12/16/19 0733 -- -- -- 96 16 93 % -- --   12/16/19 0731 130/77 37.3 °C (99.1 °F)  Tympanic 97 16 93 % -- --   12/16/19 0701 129/80 -- -- 97 19 96 % -- --   12/16/19 0631 128/80 -- -- 97 20 96 % -- --   12/16/19 0601 133/80 -- -- 98 20 95 % -- --   12/16/19 0531 131/84 -- -- 97 20 95 % -- --   12/16/19 0440 139/90 37.4 °C (99.4 °F) Oral 98 (!) 22 -- -- --   12/16/19 0320 127/71 36.4 °C (97.5 °F) Tympanic 99 18 -- -- --   12/16/19 0318 -- -- -- -- -- -- 1.829 m (6') 74.8 kg (165 lb)     General: No acute distress.   EYES: no jaundice  HEENT: OP clear   Neck: No bruits No JVD.   CVS:  RRR. S1 + S2. No M/R/G. No edema. LE warm w cap refill.   Left leg ulcer and gangrenous toes.  Resp: CTAB. No wheezing or crackles/rhonchi.  Abdomen: Soft, NT, ND  Skin: Grossly nothing acute no obvious rashes  Neurological: Alert, Moves all extremities, no cranial nerve defects on limited exam    Data:  Laboratory studies personally reviewed by me:  Recent Results (from the past 24 hour(s))   EC-ECHOCARDIOGRAM COMPLETE W/ CONT    Collection Time: 12/16/19  9:56 AM   Result Value Ref Range    Eject.Frac. MOD BP 46.9     Eject.Frac. MOD 4C 50.71     Eject.Frac. MOD 2C 45.14    APTT    Collection Time: 12/16/19  4:14 PM   Result Value Ref Range    APTT 80.6 (H) 24.7 - 36.0 sec   Prothrombin Time    Collection Time: 12/16/19  4:14 PM   Result Value Ref Range    PT 16.0 (H) 12.0 - 14.6 sec    INR 1.25 (H) 0.87 - 1.13   CBC WITHOUT DIFFERENTIAL    Collection Time: 12/16/19  4:14 PM   Result Value Ref Range    WBC 6.3 4.8 - 10.8 K/uL    RBC 3.81 (L) 4.70 - 6.10 M/uL    Hemoglobin 10.3 (L) 14.0 - 18.0 g/dL    Hematocrit 32.3 (L) 42.0 - 52.0 %    MCV 84.8 81.4 - 97.8 fL    MCH 27.0 27.0 - 33.0 pg    MCHC 31.9 (L) 33.7 - 35.3 g/dL    RDW 49.7 35.9 - 50.0 fL    Platelet Count 124 (L) 164 - 446 K/uL    MPV 10.2 9.0 - 12.9 fL   ACCU-CHEK GLUCOSE    Collection Time: 12/16/19  5:48 PM   Result Value Ref Range    Glucose - Accu-Ck 67 65 - 99 mg/dL   ACCU-CHEK GLUCOSE    Collection Time: 12/16/19  6:33 PM   Result Value Ref Range     Glucose - Accu-Ck 83 65 - 99 mg/dL   ACCU-CHEK GLUCOSE    Collection Time: 12/16/19  7:52 PM   Result Value Ref Range    Glucose - Accu-Ck 116 (H) 65 - 99 mg/dL   ACCU-CHEK GLUCOSE    Collection Time: 12/16/19 11:19 PM   Result Value Ref Range    Glucose - Accu-Ck 224 (H) 65 - 99 mg/dL   APTT    Collection Time: 12/17/19 12:33 AM   Result Value Ref Range    APTT 147.2 (HH) 24.7 - 36.0 sec   CBC WITH DIFFERENTIAL    Collection Time: 12/17/19 12:33 AM   Result Value Ref Range    WBC 7.3 4.8 - 10.8 K/uL    RBC 4.16 (L) 4.70 - 6.10 M/uL    Hemoglobin 11.3 (L) 14.0 - 18.0 g/dL    Hematocrit 35.3 (L) 42.0 - 52.0 %    MCV 84.9 81.4 - 97.8 fL    MCH 27.2 27.0 - 33.0 pg    MCHC 32.0 (L) 33.7 - 35.3 g/dL    RDW 50.3 (H) 35.9 - 50.0 fL    Platelet Count 122 (L) 164 - 446 K/uL    MPV 10.4 9.0 - 12.9 fL    Neutrophils-Polys 76.20 (H) 44.00 - 72.00 %    Lymphocytes 16.20 (L) 22.00 - 41.00 %    Monocytes 6.30 0.00 - 13.40 %    Eosinophils 0.50 0.00 - 6.90 %    Basophils 0.40 0.00 - 1.80 %    Immature Granulocytes 0.40 0.00 - 0.90 %    Nucleated RBC 0.00 /100 WBC    Neutrophils (Absolute) 5.59 1.82 - 7.42 K/uL    Lymphs (Absolute) 1.19 1.00 - 4.80 K/uL    Monos (Absolute) 0.46 0.00 - 0.85 K/uL    Eos (Absolute) 0.04 0.00 - 0.51 K/uL    Baso (Absolute) 0.03 0.00 - 0.12 K/uL    Immature Granulocytes (abs) 0.03 0.00 - 0.11 K/uL    NRBC (Absolute) 0.00 K/uL   MAGNESIUM    Collection Time: 12/17/19 12:33 AM   Result Value Ref Range    Magnesium 1.6 1.5 - 2.5 mg/dL   Basic Metabolic Panel (BMP)    Collection Time: 12/17/19 12:33 AM   Result Value Ref Range    Sodium 133 (L) 135 - 145 mmol/L    Potassium 4.1 3.6 - 5.5 mmol/L    Chloride 97 96 - 112 mmol/L    Co2 26 20 - 33 mmol/L    Glucose 258 (H) 65 - 99 mg/dL    Bun 17 8 - 22 mg/dL    Creatinine 0.64 0.50 - 1.40 mg/dL    Calcium 7.8 (L) 8.5 - 10.5 mg/dL    Anion Gap 10.0 0.0 - 11.9   HEMOGLOBIN A1C    Collection Time: 12/17/19 12:33 AM   Result Value Ref Range    Glycohemoglobin 8.5  (H) 0.0 - 5.6 %    Est Avg Glucose 197 mg/dL   ESTIMATED GFR    Collection Time: 12/17/19 12:33 AM   Result Value Ref Range    GFR If African American >60 >60 mL/min/1.73 m 2    GFR If Non African American >60 >60 mL/min/1.73 m 2   ALBUMIN    Collection Time: 12/17/19 12:33 AM   Result Value Ref Range    Albumin 2.7 (L) 3.2 - 4.9 g/dL   ACCU-CHEK GLUCOSE    Collection Time: 12/17/19  5:38 AM   Result Value Ref Range    Glucose - Accu-Ck 178 (H) 65 - 99 mg/dL   APTT    Collection Time: 12/17/19  6:19 AM   Result Value Ref Range    APTT 102.0 (HH) 24.7 - 36.0 sec       Imaging:  CL-LEFT HEART CATHETERIZATION WITH POSSIBLE INTERVENTION   Final Result      CT-CTA CHEST PULMONARY ARTERY W/ RECONS   Final Result      1.  No evidence of pulmonary embolus.      2.  Patchy bilateral infiltrates and small bilateral pleural effusions.      3.  Atherosclerotic vascular calcification.      EC-ECHOCARDIOGRAM COMPLETE W/ CONT   Final Result      US-EXTREMITY VENOUS LOWER BILAT   Final Result      DX-FOOT-COMPLETE 3+ LEFT   Final Result         1.  Fracture at the neck of the third toe proximal phalanx   2.  Fracture through the base of the second toe proximal phalanx with new osteolysis, appears likely related to pathologic fracture from suspected osteomyelitis.   3.  Osteolytic at the first metatarsophalangeal joint, increased since prior study, appearance favors progressive osteomyelitis.   4.  New osteolytic changes at the first tarsometatarsal joint, appearance most compatible with osteomyelitis.   5.  New areas of osteolysis involving the second, third, and fifth metatarsal heads, concerning for osteomyelitis   6.  Atherosclerosis      DX-CHEST-PORTABLE (1 VIEW)   Final Result         1.  Hazy and reticular bilateral lung base opacities, greater on the right, appearance suggests atelectasis and/or right lung base infiltrate.   2.  Trace right pleural effusion        EKG : personally reviewed by me sinus 99, poor R wave prog,  TWI - new changes compared to 8/22/19    TTE 12/16/19  CONCLUSIONS  Severely reduced left ventricular systolic function. Left ventricular   ejection fraction is visually estimated to be 30-35%.   There appears to be a linear echodensity which is adjacent to the LV   apex concerning for possible small LV thrombus. May consider repeat   limted echo in 1-2 days for re-evaluation if clinically indicated.    Normal inferior vena cava size and inspiratory collapse.  Indeterminate diastolic function.  Aortic sclerosis without stenosis.    Conclusions    1. Severe LV systolic dysfunction by noninvasive evaluation.    2. Severe and diffuse obstructive three-vessel coronary artery disease.    3. Normal LVEDP.     Post-Procedure Diagnosis    Ischemic cardiomyopathy.    Active Problems:    * No active hospital problems. *  Resolved Problems:    * No resolved hospital problems. *      Assessment / Plan:    54 year old man with PMH DM2, HTN, PAD s/p angioplasty presents with toe gangrene and found osteomyelitis. Consult for preop evaluation given cardiac marker elevation and EKG changes. On workup, found new HFrEF 30-35% and obstructive multivessel CAD.    -cont antiplatelets unless interfere with surgical plans  -hep gtt for possible LV thrombus - - repeat limited tomorrow, if ruled out can d/c hep  -with respect to surgery, he is not yet optimized. Would appreciate titrating for another day or two. If acceptable to surgery would plan for Friday OR with local anesthesia.   -please obtain records of LE ALYSSA/TBI or repeat to assess for adequate healing post op.  -please titrate coreg daily and hold for SBP <90 HR <60  -please titrate valsartan and hold for SBP <90 - - will aim to titrate up as tolerated for easy conversion to outpatient entresto  -please add spironolactone 25 mg today  -CTS eval to plan for CABG post LLE surgery    I personally discussed his case with  Dr Morel.     It is my pleasure to participate in the care  of Mr. Aviles.  Please do not hesitate to contact me with questions or concerns.    Mahesh Segovia MD  Cardiologist [facility]

## 2019-12-17 NOTE — PROGRESS NOTES
Infectious Disease Progress Note    Author: Angie Gonzalez M.D. Date & Time of service: 2019  1:46 PM    Chief Complaint:  Left lower extremity gangrene, LLE ulceration, and cellulitis.       Interval History:  55 y/o diabetic male admitted with dyspnea. Found to have ischemic CM and above   AF WBC 7.3 planned for bypass-ortho surgery on hold. No current SOB or CP. Pain in LLE controlled. States while in [place] had fever, cough, SOB. Afebrile for one week prior to return home. Declined treatment for LLE while in [place].  Labs Reviewed, Medications Reviewed, Radiology Reviewed and Wound Reviewed.    Review of Systems:  Review of Systems   Constitutional: Negative for chills and fever.   Respiratory: Negative for cough, hemoptysis, sputum production and shortness of breath.    Cardiovascular: Positive for leg swelling. Negative for chest pain.   All other systems reviewed and are negative.      Hemodynamics:  Temp (24hrs), Av.3 °C (97.3 °F), Min:36.1 °C (96.9 °F), Max:36.7 °C (98 °F)  Temperature: 36.3 °C (97.4 °F)  Pulse  Av.1  Min: 88  Max: 106   Blood Pressure: (!) 97/50(nurse notified)       Physical Exam:  Physical Exam  Vitals signs and nursing note reviewed.   Constitutional:       General: He is not in acute distress.     Appearance: He is not ill-appearing or toxic-appearing.   HENT:      Nose: No congestion.      Mouth/Throat:      Pharynx: No oropharyngeal exudate.   Eyes:      General: No scleral icterus.     Extraocular Movements: Extraocular movements intact.      Pupils: Pupils are equal, round, and reactive to light.   Cardiovascular:      Rate and Rhythm: Tachycardia present.   Pulmonary:      Effort: Pulmonary effort is normal. No respiratory distress.      Breath sounds: No stridor. No wheezing or rhonchi.   Abdominal:      General: Bowel sounds are normal. There is no distension.      Palpations: Abdomen is soft.      Tenderness: There is no tenderness.   Musculoskeletal:          General: Swelling present.      Left lower leg: Edema present.   Skin:     Coloration: Skin is not jaundiced.      Comments: Large deep ulceration dorsum left foot  Dry gangrene 2nd and 3rd toes, left   Neurological:      General: No focal deficit present.      Mental Status: He is alert and oriented to person, place, and time.         Meds:    Current Facility-Administered Medications:   •  NS  •  influenza vaccine quad  •  spironolactone  •  carvedilol  •  valsartan  •  senna-docusate **AND** polyethylene glycol/lytes **AND** magnesium hydroxide **AND** bisacodyl  •  Respiratory Care per Protocol  •  enalaprilat  •  cloNIDine  •  insulin glargine **AND** insulin lispro **AND** Accu-Chek Q6 if NPO **AND** NOTIFY MD and PharmD **AND** glucose **AND** dextrose 10% bolus  •  atorvastatin  •  clopidogrel  •  aspirin  •  ampicillin-sulbactam (UNASYN) IV  •  [COMPLETED] heparin **AND** heparin **AND** heparin **AND** Protocol 440 Heparin Weight Based DO NOT GIVE ANY HEPARIN BOLUS TO STROKE PATIENT **AND** Protocol 440 Heparin Weight Based Discontinue Enoxaparin (Lovenox), Dabigatran (Pradaxa), Rivaroxaban (Xarelto), Apixaban (Eliquis), Edoxaban (Savaysa, Lixiana), Fondaparinux (Arixtra) and Argatroban prior to heparin administration **AND** Protocol 440 Heparin Weight Based Draw baseline aPTT, PT, and platelet count if not already done **AND** Protocol 440 Heparin Weight Based Draw aPTT 6 hours after beginning infusion.  **AND** Protocol 440 Heparin Weight Based Draw Platelet count every three days. Contact MD if platelet is 50% lower than baseline count. **AND** Protocol 440 Heparin Weight Based Record Patient Data **AND** Protocol 440 Heparin Weight Based INSTRUCTIONS **AND** Protocol 440 Heparin Weight Based Review aPTT results 6 hours after infusion is begun as detailed **AND** Protocol 440 Heparin Weight Based Adjust heparin to maintain aPTT between 55-96 sec **AND** Protocol 440 Heparin Weight Based Order aPTT  6 hours after any rate change or hold until aPTT is therapeutic (55-96 seconds) **AND** Protocol 440 Heparin Weight Based Documentation and verification  •  linezolid    Labs:  Recent Labs     12/16/19  0332 12/16/19  1614 12/17/19  0033   WBC 8.7 6.3 7.3   RBC 3.80* 3.81* 4.16*   HEMOGLOBIN 10.1* 10.3* 11.3*   HEMATOCRIT 32.8* 32.3* 35.3*   MCV 86.3 84.8 84.9   MCH 26.6* 27.0 27.2   RDW 51.4* 49.7 50.3*   PLATELETCT 140* 124* 122*   MPV 10.0 10.2 10.4   NEUTSPOLYS 81.60*  --  76.20*   LYMPHOCYTES 12.30*  --  16.20*   MONOCYTES 5.20  --  6.30   EOSINOPHILS 0.00  --  0.50   BASOPHILS 0.00  --  0.40   RBCMORPHOLO Present  --   --      Recent Labs     12/16/19  0332 12/17/19  0033   SODIUM 125* 133*   POTASSIUM 4.0 4.1   CHLORIDE 91* 97   CO2 29 26   GLUCOSE 366* 258*   BUN 23* 17     Recent Labs     12/16/19  0332 12/17/19  0033   ALBUMIN 2.9* 2.7*   TBILIRUBIN 0.4  --    ALKPHOSPHAT 85  --    TOTPROTEIN 8.1  --    ALTSGPT 48  --    ASTSGOT 43  --    CREATININE 0.88 0.64       Imaging:  Dx-chest-portable (1 View)    Result Date: 12/16/2019 12/16/2019 3:31 AM HISTORY/REASON FOR EXAM: Shortness of Breath TECHNIQUE/EXAM DESCRIPTION:  Single AP view of the chest. COMPARISON: None FINDINGS: Overlying cardiac leads are present. The cardiac silhouette appears within normal limits. The mediastinal contour appears within normal limits.  The central pulmonary vasculature appears normal. The lungs appear well expanded bilaterally.  Hazy and reticular bilateral lung base opacities are seen, greater on the right. Mild blunting of the right costophrenic angle is seen suggesting small right effusion. The bony structures appear age-appropriate.     1.  Hazy and reticular bilateral lung base opacities, greater on the right, appearance suggests atelectasis and/or right lung base infiltrate. 2.  Trace right pleural effusion    Dx-foot-complete 3+ Left    Result Date: 12/16/2019 12/16/2019 4:07 AM HISTORY/REASON FOR EXAM: Atraumatic  Pain/Swelling/Deformity; Gangrenous second and third toe, nonhealing ulcer to the medial surface of the foot.  Suspect osteomyelitis TECHNIQUE/EXAM DESCRIPTION:  AP, lateral, and oblique views of the LEFT foot. COMPARISON:  August 21, 2019 FINDINGS: There is third toe proximal phalanx neck fracture identified. There is fracture through the base of the second toe proximal phalanx. There is mild ostial lysis at the base of the second toe which is new since prior study. Osteolytic changes at the first metatarsophalangeal joint are seen, slightly more pronounced since prior study. New areas of osteolysis are seen involving the second, third, and fifth metatarsal heads. There are new lytic changes identified at the first tarsometatarsal joint. Atherosclerotic changes are noted. Soft tissue swelling of the forefoot is noted.     1.  Fracture at the neck of the third toe proximal phalanx 2.  Fracture through the base of the second toe proximal phalanx with new osteolysis, appears likely related to pathologic fracture from suspected osteomyelitis. 3.  Osteolytic at the first metatarsophalangeal joint, increased since prior study, appearance favors progressive osteomyelitis. 4.  New osteolytic changes at the first tarsometatarsal joint, appearance most compatible with osteomyelitis. 5.  New areas of osteolysis involving the second, third, and fifth metatarsal heads, concerning for osteomyelitis 6.  Atherosclerosis    Us-extremity Venous Lower Bilat    Result Date: 12/16/2019   Vascular Laboratory  CONCLUSIONS  Normal bilateral superficial and deep venous examination of the lower  extremities.  Prominent left inguinal lymph nodes.  TRELL CASTLE  Exam Date:     12/16/2019 07:02  Room #:     Inpatient  Priority:     Routine  Ht (in):             Wt (lb):  Ordering Physician:        REBECCA REARDON  Referring Physician:       [id number]  Sonographer:               Bonnie Bryant RVT   Study Type:                Complete Bilateral  Technical Quality:         Adequate  Age:    54    Gender:     M  MRN:    [id number]  :    1965      BSA:  Indications:     Localized swelling, mass and lump, unspecified lower limb  CPT Codes:       53700  ICD Codes:       R22.40  History:         Bilateral swelling  Limitations:  PROCEDURES:  Bilateral lower extremity venous duplex imaging.  The following venous structures were evaluated: common femoral, profunda  femoral, greater saphenous, femoral, popliteal , peroneal and posterior  tibial veins.  Serial compression, color and spectral Doppler flow evaluations were  performed.  FINDINGS:  Bilateral lower extremities -.  No deep venous thrombosis.  Complete color filling and compressibility with normal venous flow dynamics  including spontaneous flow, response to augmentation maneuvers, and  respiratory phasicity.  There are two enlarged lyph nodes seen in the left groin. The largest  measuring 2.0x0.92cm  Reinier Wray  (Electronically Signed)  Final Date:      2019                   08:34    Ct-cta Chest Pulmonary Artery W/ Recons    Result Date: 2019 9:34 AM HISTORY/REASON FOR EXAM:  Shortness of breath and tachycardia TECHNIQUE/EXAM DESCRIPTION: CT angiogram scan for pulmonary embolism with contrast, with reconstructions. 1.25 mm helical sections were obtained from the lung apices through the lung bases following the rapid bolus administration of 55 mL of Omnipaque 350 nonionic contrast. Thin-section overlapping reconstruction interval was utilized. Coronal reconstructions were generated from the axial data. MIP post processing was performed and utilized for the interpretation. Low dose optimization technique was utilized for this CT exam including automated exposure control and adjustment of the mA and/or kV according to patient size. COMPARISON: None FINDINGS: There are patchy bilateral infiltrates. There are small bilateral  pleural effusions. There are no pulmonary nodules seen. There is no evidence of mediastinal or hilar adenopathy. There is atherosclerotic vascular calcification. No large masses are seen within the upper abdomen. There is no evidence of filling defect within the pulmonary arterial system to suggest presence of pulmonary embolus.     1.  No evidence of pulmonary embolus. 2.  Patchy bilateral infiltrates and small bilateral pleural effusions. 3.  Atherosclerotic vascular calcification.    Ec-echocardiogram Complete W/ Cont    Result Date: 2019  Transthoracic Echo Report Echocardiography Laboratory CONCLUSIONS Severely reduced left ventricular systolic function. Left ventricular ejection fraction is visually estimated to be 30-35%. There appears to be a linear echodensity which is adjacent to the LV apex concerning for possible small LV thrombus. May consider repeat limted echo in 1-2 days for re-evaluation if clinically indicated.  Normal inferior vena cava size and inspiratory collapse. Indeterminate diastolic function. Aortic sclerosis without stenosis. Dr. Segovia is aware of the above findings. TRELL CASTLE Exam Date:         2019                    09:06 Exam Location:     Inpatient Priority:          Routine Ordering Physician:        REBECCA REARDON Referring Physician: Sonographer:               Annika Sousa RDCS Age:    54     Gender:    M MRN:    [id number] :    1965 BSA:    1.96   Ht (in):    72     Wt (lb):    165 Exam Type:     Complete Indications:     Shortness of breath ICD Codes:       R06.02 CPT Codes:       48443,  BP:   139    /   90     HR:   100 Technical Quality:       Fair MEASUREMENTS  (Male / Female) Normal Values 2D ECHO LV Diastolic Diameter PLAX        5.5 cm                4.2 - 5.9 / 3.9 - 5.3 cm LV Systolic Diameter PLAX         4 cm                  2.1 - 4.0 cm IVS Diastolic Thickness           0.75 cm               LVPW  Diastolic Thickness          0.92 cm               RV Diameter 4C                    2.2 cm                2.5 - 2.9 cm LVOT Diameter                     2.1 cm                RA Diameter                       4.3 cm                LV Ejection Fraction MOD BP       46.9 %                >= 55  % LV Ejection Fraction MOD 4C       50.7 %                LV Ejection Fraction MOD 2C       45.1 %                LA Volume Index                   34.9 cm???/m???           16 - 28 cm???/m??? IVC Diameter                      1.9 cm                DOPPLER AV Peak Velocity                  1 m/s                 AV Peak Gradient                  4.2 mmHg              AV Mean Gradient                  2.7 mmHg              LVOT Peak Velocity                0.61 m/s              AV Area Cont Eq vti               1.9 cm???               MV Velocity Time Integral         13.4 cm               Mitral E Point Velocity           0.78 m/s              Mitral E to A Ratio               1.3                   MV Pressure Half Time             40.6 ms               MV Area PHT                       5.4 cm???               MV Deceleration Time              140 ms                PV Peak Velocity                  0.92 m/s              PV Peak Gradient                  3.4 mmHg              Pulmonary Artery Diastolic Press  4.2 mmHg              RVOT Peak Velocity                0.75 m/s              * Indicates values subject to auto-interpretation LV EF:        % FINDINGS Left Ventricle Normal left ventricular chamber size. Normal left ventricular wall thickness. Severely reduced left ventricular systolic function. Left ventricular ejection fraction is visually estimated to be 30-35%. Global hypokinesis with apical dyskinesis. Basal septum appears to have normal contractility.  Indeterminate diastolic function.  Contrast was used to evaluate for thrombus in the left ventricular apex. There appears to be a linear echodensity which is adjacent  to the LV apex. LV thrombus cannot be ruled out. Consider repeat limted echo in 1-2 days for re-evaluation if clinically indicated.  Spontaneous contrast is noted. 3 ML of contrast was administered. Existing IV was used. Right Ventricle Normal right ventricular size and systolic function. Right Atrium Normal right atrial size. Normal inferior vena cava size and inspiratory collapse. Left Atrium Mildly dilated left atrium. Left atrial volume index is 35 mL/sq m. Mitral Valve Mitral annular calcification. No mitral stenosis. Mild mitral regurgitation. Aortic Valve Tricuspid aortic valve. Aortic sclerosis without stenosis. No aortic insufficiency. Tricuspid Valve Structurally normal tricuspid valve. No tricuspid stenosis. Trace tricuspid regurgitation. Unable to estimate pulmonary artery pressure due to an inadequate tricuspid regurgitant jet. Pulmonic Valve The pulmonic valve is not well visualized.  No pulmonic stenosis. Trace pulmonic insufficiency. Pericardium No pericardial effusion seen. Aorta Normal aortic root for body surface area. Ascending aorta diameter is 3.3 cm. Caitlin Acosta MD (Electronically Signed) Final Date:     16 December 2019                 12:09    Cl-left Heart Catheterization With Possible Intervention    Result Date: 12/16/2019  Conclusions   1. Severe LV systolic dysfunction by noninvasive evaluation.   2. Severe and diffuse obstructive three-vessel coronary artery disease.   3. Normal LVEDP. Post-Procedure Diagnosis   Ischemic cardiomyopathy. Recommendations   * Medical therapy of coronary artery disease and ischemic cardiomyopathy. Consider nonurgent and staged revascularization, preferably bypass surgery.   * Would be reasonable to proceed with planned amputation of the foot prior to coronary artery revascularization.  If this path is elected it would be at increased risk of perioperative cardiovascular complications and I would recommend perioperative antiplatelet medication statin  therapy beta-blockade and close attention to avoid hemodynamic stressors for excess blood loss/fluid shifts. Patient Info Name:     TRELL CASTLE Age:     54 yrs MRN:     [id number] Admit Date:     2019 Exam Date:     2019 1:18 PM Exam Location:     Cath Lab Ht:     183 cm Wt:     75 kg BSA:     1.95 m2 Any Known Allergies:     No Known Drug Allergies Gender:     Male :     1965 Order #:     [id number] Account #:     [id number] Performing Physician(s):     Keshawn Becker MD Exam Type:     CL-LEFT HEART CATHETERIZATION WITH POSSIBLE INTERVENTION Referring Physician(s):     ARIANA Cardona; Exam Priority:     Routine Indication(s)   New onset LV systolic dysfunction. History/Risk Factors Diabetes Mellitus:     Yes Hypertension:     Yes Dialysis:     No Tobacco Use:     Never Coronary Diagnostic Findings   * Left main: Distal tapering with 50% stenosis.   * Left anterior descending: Diffuse atherosclerosis and negative remodeling beginning at the origin.  There are sequential 70% stenosis in the proximal and mid segment of the vessel.  There is subtotal occlusion in the mid to distal segment with AGATHA II flow into the apical LAD. The first diagonal has proximal 70% stenosis and is diffusely diseased and negatively remodeled with 95% stenosis in the lower branch.   * Left circumflex: Mid vessel 70-80% stenosis.  The OM1 has diffuse atherosclerosis there is less than 1.5 mm in size and without significant narrowing.  The OM 2 bifurcates proximally the upper branch has proximal 50% stenosis and is a less than 1.5 mm vessel.  The lower branch is a 2.25 mm vessel and has proximal 85% stenosis.   * Right coronary artery: Dominant, diffuse 60% stenosis in the proximal mid and distal AV groove.  There is a dual PDA.  The first PDA has a mid 80% stenosis and diffuse disease downstream.  The second PDA has proximal 80% stenosis. Cath Hemodynamic Data Pressures     Phase:Baseline     AO (sys/bauman/mean)  :  99 mmHg / 61 mmHg ( 73 mmHg )  @ 2:02:13 PM                           90 mmHg / 57 mmHg ( 69 mmHg )  @ 2:06:18 PM                           92 mmHg / 56 mmHg ( 73 mmHg )  @ 2:06:41 PM     LV (sys/bauman/EDP) :  91 mmHg / 8 mmHg / 12 mmHg  @ 2:06:10 PM                          89 mmHg / 8 mmHg / 12 mmHg  @ 2:06:13 PM     AO (sys/bauman/mean) HR:  91 bpm  @ 2:02:13 PM                             96 bpm  @ 2:06:18 PM                             96 bpm  @ 2:06:41 PM     LV (sys/bauman/EDP) HR:  96 bpm  @ 2:06:10 PM                            95 bpm  @ 2:06:13 PM     LV (sys/bauman/EDP) DP/DT:  816 mmHg/s  @ 2:06:10 PM                               816 mmHg/s  @ 2:06:13 PM Access Closure   * Sheath Pulled and Post Procedure Forms (Pulses, etc.) Closure Device Deployed. Procedural Details   Correct Patient established (2 Patient Identifiers).   Correct Procedure verified with Patient.   Informed Consent Signed By MD And On Chart.   The risks and benefits of cardiac catheterization as well as the procedure itself, rationale and appropriateness were discussed with the patient. Complications including but not limited to death, stroke, MI, urgent bypass surgery, contrast nephropathy, vascular complications, bleeding and infection were explained to the patient.   Pre-procedure Teaching Performed.   Patient Verbalized Understanding of Procedure.   Updated and Current H & P On The Chart.   The insertion site was disinfected with a Chlorhexidine-based preparation. The antiseptic remained on the insertion site and was allowed to air dry for at least two (2) minutes. The patient was covered with a large sterile drape, with a small opening corresponding to the insertion site. The sterile drape covered the patient from head to toe.   Pre-Anesthesia Assessment & Sedation Plan Completed.   Percutaneous Puncture to right radial artery.   6FR 6FR Slender SWS Glidesheath Sheath Inserted Into right radial artery.   5fr/100cm TIG 4.0 100cm  Catheter Inserted by Guidewire.   150cm .035 J Guidewire used.   Left Coronary System Injected & Multi. Views Taken.   Right Coronary System Injected & Multi. Views Taken.   Pullback LV to AO.   Catheter Removed.   No Complications.   Sheath Pulled and Post Procedure Forms (Pulses, etc.) Closure Device Deployed.   Patent Hemostasis Confirmed/Reverse Barbeau Test Done by: MAKAYLA.   Post Procedure Teaching Performed.   Patient Verbalizes Understanding of Teaching. Access Site   * Percutaneous Puncture to right radial artery.   * 6FR 6FR Slender SWS Glidesheath Sheath Inserted Into right radial artery. Sedation Start Time:     13:52 Sedation Stop Time:     14:07 Sedation Total Time:     16 min Case Start Time:     13:59 Procedure(s) Performed   * Hemostasis with Radial Compression Device.   * Art Access - R radial artery.   * Coronary Angio.   * Left Heart Cath. Procedure Medications ------------------------------ Start:     1:52 PM Medication:     Versed Amount:     1 mg Route:     IV Administered By:     Gris Rodriguez Ordered By:     Rosita Liao ------------------------------ Start:     1:52 PM Medication:     Fentanyl Amount:     50 mcg Route:     IV Administered By:     Gris Rodriguez Ordered By:     Rosita Liao ------------------------------ Start:     2:00 PM Medication:     2% Lidocaine Amount:     2 ml Route:     Subcut Administered By:     Rosita Liao Ordered By:     Rosita Liao ------------------------------ Start:     2:01 PM Medication:     Heparin Amount:     5000 units Route:     IA Administered By:     Rosita Liao Ordered By:     Rosita Liao ------------------------------ Start:     2:01 PM Medication:     Nitroglycerin Amount:     100 mcg Route:     IA Administered By:     Rosita Liao Ordered By:     Rosita Liao ------------------------------ Start:     2:01 PM Medication:     Verapamil Amount:     2.5 mg Route:     IA Administered By:     Rosita Liao Ordered By:     Rosita Liao  "------------------------------ Start:     2:03 PM Medication:     Nitroglycerin Amount:     240 mcg Route:     IC Administered By:     Rosita Liao Ordered By:     Rosita Liao X-ray Summary ------------------------------ Total Time (min):     1.40 Total Dose (mGy):     293 DAP (cGy*cm):     --- Contrast ------------------------------ Contrast:     Omnipaque 350 Amount (mL):     25 ml Report Signatures Finalized by Keshawn Becker MD on 12/16/2019 02:27 PM      Micro:  Results     Procedure Component Value Units Date/Time    BLOOD CULTURE [[id number]] Collected:  12/16/19 0432    Order Status:  Completed Specimen:  Blood from Peripheral Updated:  12/17/19 0835     Significant Indicator NEG     Source BLD     Site PERIPHERAL     Culture Result No Growth  Note: Blood cultures are incubated for 5 days and  are monitored continuously.Positive blood cultures  are called to the RN and reported as soon as  they are identified.      Narrative:       Per Hospital Policy: Only change Specimen Src: to \"Line\" if  specified by physician order.  Right Hand    BLOOD CULTURE [[id number]] Collected:  12/16/19 0450    Order Status:  Completed Specimen:  Blood from Peripheral Updated:  12/17/19 0835     Significant Indicator NEG     Source BLD     Site PERIPHERAL     Culture Result No Growth  Note: Blood cultures are incubated for 5 days and  are monitored continuously.Positive blood cultures  are called to the RN and reported as soon as  they are identified.      Narrative:       Per Hospital Policy: Only change Specimen Src: to \"Line\" if  specified by physician order.  No site indicated    URINALYSIS CULTURE, IF INDICATED [[id number]]  (Abnormal) Collected:  12/16/19 0541    Order Status:  Completed Specimen:  Urine Updated:  12/16/19 0555     Color Yellow     Character Clear     Specific Gravity 1.019     Ph 7.0     Glucose >=1000 mg/dL      Ketones Negative mg/dL      Protein 30 mg/dL      Bilirubin Negative     Urobilinogen, " Urine 0.2     Nitrite Negative     Leukocyte Esterase Negative     Occult Blood Negative     Micro Urine Req Microscopic          Assessment:  Left lower extremity gangrene, LLE ulceration, and cellulitis  OM left foot, multifocal.   Ischemic cardiomyopathy  Diabetes  Carbapenemase +in stool  PVD    Plan:  Left lower extremity gangrene, dry  LLE dorsal foot ulceration  Cellulitis, improved  Afebrile  No current leukcytosis  Blood cxs neg  DW Ortho: Had been seen at [facility] and declined amp toes/ BKA prior to trip to [facility]  Per patient while in [facility] did not seek any treatment for LLE-told doctors there just to dress it  Imaging +multifocal OM  Continue Zyvox and Unasyn for now    CAD  S/p cardiac cath  Plan for CABG  Ischemic CM with EF 15%  CXR pulm edema by my read    Diabetes.   Keep BS under 150 to help control current infection    PVD  WIll impair abx efficacy and wound care  Vascular eval appreciated-no intervention planned    Stool PCR +carbapenemase  Contact isolation  Escalate abx if worsening infection    Discussed with internal medicine/ortho.

## 2019-12-17 NOTE — PROGRESS NOTES
[facility] FAMILY MEDICINE PROGRESS NOTE     Attending: Dr. Mcnally  Resident: Teresa Canas ( PGY-1)  PATIENT: Israel Aviles; [id number]; 1965    ID: 54 y.o. male admitted for osteomyelitis of the left metatarsophalangeal joint, second, thirds and fifth metatarsal and elevated cardiac enzymes with EKG changes    SUBJECTIVE: 1 low BP overnight of 95/64, night team notified and given 250 cc bolus.  This AM patinent is resting in bed comfortably. No complaints of chest pain/tightness, limb pain, abdominal pain, SOB.     OBJECTIVE:     Vitals:    12/16/19 2100 12/16/19 2134 12/17/19 0000 12/17/19 0429   BP:  104/64 113/69 118/62   Pulse:  (!) 104 (!) 102 99   Resp:   18 18   Temp:   36.4 °C (97.6 °F) 36.1 °C (97 °F)   TempSrc:       SpO2:   95% 95%   Weight: 76.6 kg (168 lb 14 oz)      Height:           Intake/Output Summary (Last 24 hours) at 12/17/2019 0547  Last data filed at 12/16/2019 0630  Gross per 24 hour   Intake 100 ml   Output --   Net 100 ml       PE:  General: No acute distress, resting comfortably in bed.  HEENT: NC/AT. PERRLA. EOMI. MMM  Cardiovascular: RRR with no M/R/G.  Respiratory: Symmetrical chest. 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 No extending erythema or drainage. Open wounds present on left medial and anterior foot. Healing wound present on left lateral foot.     LABS:  Recent Labs     12/16/19  0332 12/16/19  1614 12/17/19  0033   WBC 8.7 6.3 7.3   RBC 3.80* 3.81* 4.16*   HEMOGLOBIN 10.1* 10.3* 11.3*   HEMATOCRIT 32.8* 32.3* 35.3*   MCV 86.3 84.8 84.9   MCH 26.6* 27.0 27.2   RDW 51.4* 49.7 50.3*   PLATELETCT 140* 124* 122*   MPV 10.0 10.2 10.4   NEUTSPOLYS 81.60*  --  76.20*   LYMPHOCYTES 12.30*  --  16.20*   MONOCYTES 5.20  --  6.30   EOSINOPHILS 0.00  --  0.50    BASOPHILS 0.00  --  0.40   RBCMORPHOLO Present  --   --      Recent Labs     12/16/19  0332 12/17/19  0033   SODIUM 125* 133*   POTASSIUM 4.0 4.1   CHLORIDE 91* 97   CO2 29 26   BUN 23* 17   CREATININE 0.88 0.64   CALCIUM 8.4* 7.8*   MAGNESIUM 1.7 1.6   PHOSPHORUS 2.2*  --    ALBUMIN 2.9*  --      Estimated GFR/CRCL = Estimated Creatinine Clearance: 143 mL/min (by C-G formula based on SCr of 0.64 mg/dL).  Recent Labs     12/16/19  0332  12/16/19  1833 12/16/19  1952 12/16/19  2319 12/17/19  0033   GLUCOSE 366*  --   --   --   --  258*   POCGLUCOSE  --    < > 83 116* 224*  --     < > = values in this interval not displayed.     Recent Labs     12/16/19  0332 12/16/19  1614   ASTSGOT 43  --    ALTSGPT 48  --    TBILIRUBIN 0.4  --    ALKPHOSPHAT 85  --    GLOBULIN 5.2*  --    INR  --  1.25*             Recent Labs     12/16/19  1614 12/17/19  0033   INR 1.25*  --    APTT 80.6* 147.2*       MICROBIOLOGY:     IMAGING:   CL-LEFT HEART CATHETERIZATION WITH POSSIBLE INTERVENTION   Final Result      CT-CTA CHEST PULMONARY ARTERY W/ RECONS   Final Result      1.  No evidence of pulmonary embolus.      2.  Patchy bilateral infiltrates and small bilateral pleural effusions.      3.  Atherosclerotic vascular calcification.      EC-ECHOCARDIOGRAM COMPLETE W/ CONT   Final Result      US-EXTREMITY VENOUS LOWER BILAT   Final Result      DX-FOOT-COMPLETE 3+ LEFT   Final Result         1.  Fracture at the neck of the third toe proximal phalanx   2.  Fracture through the base of the second toe proximal phalanx with new osteolysis, appears likely related to pathologic fracture from suspected osteomyelitis.   3.  Osteolytic at the first metatarsophalangeal joint, increased since prior study, appearance favors progressive osteomyelitis.   4.  New osteolytic changes at the first tarsometatarsal joint, appearance most compatible with osteomyelitis.   5.  New areas of osteolysis involving the second, third, and fifth metatarsal heads,  concerning for osteomyelitis   6.  Atherosclerosis      DX-CHEST-PORTABLE (1 VIEW)   Final Result         1.  Hazy and reticular bilateral lung base opacities, greater on the right, appearance suggests atelectasis and/or right lung base infiltrate.   2.  Trace right pleural effusion          MEDS:  Current medications reviewed     MICROBIOLOGY: Blood cultures showing NGTD @ 24hrs    ASSESSMENT/PLAN:   55 yo M admitted this AM with a PMHx sig for stroke and type 2 diabetes admitted for worsening osteomyelitis of left metatarsophalangeal joint, second, thirds and fifth metatarsal and elevated cardiac enzymes with EKG changes     #Osteomyletis - left metatarsophalangeal joint, second, thirds and fifth metatarsal   #Diabetic Foot Ulcers  - Patient s/p IV antibiotics while in [place], Zyvox and Meropenem   - Completed a long course of Daptomycin back in August while hospitalized, refused BKA at this time  - L foot XRAY on this admission showing fractures and osteolytic changes secondary to suspected osteomylitis   - Lab workup showing normal lactic acid of 1.8. No leukocytosis seen  - CRP and sed rate elevated as excepted   - ID consulted, appreciate any recommendations. Start Zyvox and recommend BKA.  - Wound care and limb preservation services consulted  - Vascular surgery consulted and recommended amputation of L foot  - Ortho surgery consulted and recommended amputation of 2nd and 3rd toes with wound debridement. Recommended heel weightbearing to left lower extremity  -Cardio recommending patient be optimized on cardiac meds before undergoing surgery. Plan for surgery this Friday 12/20 or next Monday 12/23  -Per ortho we will contact limb preservation team when patient is fully optimized     Plan:  Continue Unasyn 3g TID and Zyvox 600mg BID  Wounds dressed per wound consult and RN   Start diet status until surgery scheduled  Continue to monitor for signs of sepsis           #Acute Dyspnea  - Recent long travel to  [place], not on any anticoagulation at that time  - PE vs CHF with pulm edema  - CXR showing b/l hazy lung infiltrates  - proBNP elevated at 6008  - CTA on 12/16 showing no evidence of PE  - ECHO on 12/16 showing severely reduced LVF of 30-35% with concern for small LV thrombus. Aortic sclerosis present  - Cardiology consulted and recommended starting Heparin gtt, Coreg, Valsartan with plans to start Sprironolactone today      Plan:   Continue to follow Cardiology recommendations  Per cardio, repeate TTE tomorrow for evaluation of LV thrombus, if still indeterminate will need cardiac MRI   Continue Coreg 3.125 mg BID  Continue Valsartan 40 mg BID  Will start Spironolactone 25 mg today   Monitor daily labs and vitals   Heparin drip dosed per pharmacy     #Chest pain   #Elevated cardiac enzymes with EKG changes  - Elevated Troponin T of 64 on admission down trending to 44 on r/p  - Cardiology reviewed EKG showing sinus rhythm with poor R wave progression, TWI  - Diagnostic left heart cath showing multiple blockages. No interventions done   - Cards recommending avoiding general anaesthesia at this time     Plan:   Admit to telemetry   Continue to follow recommendations from Cardiology   Patient started on Heparin Drip dosed per pharmacy        #Type 2 Diabetes  -Patient on metformin and glipizide at baseline  -Unclear of how controlled patient is   -Last A1C in 8/22 was 7.  -R/P A1C this AM 8.5%       Plan   D/C metformin and glipizide  Paitent started on low dose SSI   Hypoglycemia protocol intitated  Dietary consulted for diabetic education             #Hyponatremia  - Na of 125 on admission with a BG of 336   - Corrected Na of 131  - 500 cc NS bolus given   - Na 136 this AM    Plan  - Repeat BMP in AM   - Will replete as needed         Core Measures   VTE PPx: heparin drip  Abx: Unasyn    Diet: Diabetic  Fluids: No IVFs  Lines and Tube: PIV   Code Status: Full code           Teresa Canas M.D.   PGY-1  [facility]   [phone]

## 2019-12-17 NOTE — CONSULTS
Diabetes Nurse Specialist.   Patient with known history of type 2 diabetes admitted with osteomyelitis and gangrene.   Patient seen previous admit for extensive diabetes education.  He states he checks his blood sugars bid and most blood sugars in the 120-200 range.   He has been on Lantus prn blood sugars greater than 150 and Glipizide 5 mg bid.  His last A1c in August was 7.7.   FSBS this am on admit was 219.    Patient states he feels as if he does a good job managing his diabetes at home and denies requiring any further education at this time.   Please see previous diabetes education notes below.       Nettie Hernandez R.N.   Registered Nurse      Consults   Signed   Date of Service:  6/25/2019  2:33 PM                    []Hide copied text    []Marciano for details  Diabetes Nurse Specialist:  Patient with known type 2 diabetes presented to hospital with a chronic foot wound of greater than 2 months.    Patient states that he generally checks his blood sugars one time per day at home, usually fasting and states most of the time they are in the lower 100 range.  He is on Glipizide 5 mg bid and Metformin  500 mg daily.   His current A1c is 12.3, although he states that his last one several months ago was around 7.2.  We discussed the importance of foot care, checking feet on daily basis.   Discussed how stress (infection, hospitalization, etc) can raise blood sugars and the importance of good control in order for his foot to heal properly.    He is currently on lantus 20 units in the hospital and Regular insulin at meals (sliding scale).   I did review insulin and how to give injection utilizing an insulin pen in the event he is discharged on insulin.   For any further needs please call [phone] for Diabetes Nurse Specialist

## 2019-12-17 NOTE — CARE PLAN
Problem: Communication  Goal: The ability to communicate needs accurately and effectively will improve  Outcome: PROGRESSING AS EXPECTED  Intervention: [place] patient and significant other/support system to call light to alert staff of needs  Flowsheets (Taken 12/16/2019 2150)  Oriented to:: All of the Following : Location of Bathroom, Visiting Policy, Unit Routine, Call Light and Bedside Controls, Bedside Rail Policy, Smoking Policy, Rights and Responsibilities, Bedside Report, and Patient Education Notebook     Problem: Safety  Goal: Will remain free from falls  Outcome: PROGRESSING AS EXPECTED  Intervention: Implement fall precautions  Flowsheets  Taken 12/16/2019 2000  Environmental Precautions: Treaded Slipper Socks on Patient;Transferred to Stronger Side;Personal Belongings, Wastebasket, Call Bell etc. in Easy Reach;Report Given to Other Health Care Providers Regarding Fall Risk;Bed in Low Position;Communication Sign for Patients & Families;Mobility Assessed & Appropriate Sign Placed

## 2019-12-18 ENCOUNTER — APPOINTMENT (OUTPATIENT)
Dept: CARDIOLOGY | Facility: MEDICAL CENTER | Age: 54
DRG: 616 | End: 2019-12-18
Attending: STUDENT IN AN ORGANIZED HEALTH CARE EDUCATION/TRAINING PROGRAM
Payer: COMMERCIAL

## 2019-12-18 LAB
ANION GAP SERPL CALC-SCNC: 7 MMOL/L (ref 0–11.9)
BASOPHILS # BLD AUTO: 0 % (ref 0–1.8)
BASOPHILS # BLD: 0 K/UL (ref 0–0.12)
BUN SERPL-MCNC: 16 MG/DL (ref 8–22)
CALCIUM SERPL-MCNC: 7.6 MG/DL (ref 8.5–10.5)
CHLORIDE SERPL-SCNC: 100 MMOL/L (ref 96–112)
CO2 SERPL-SCNC: 25 MMOL/L (ref 20–33)
CREAT SERPL-MCNC: 0.66 MG/DL (ref 0.5–1.4)
EOSINOPHIL # BLD AUTO: 0 K/UL (ref 0–0.51)
EOSINOPHIL NFR BLD: 0 % (ref 0–6.9)
ERYTHROCYTE [DISTWIDTH] IN BLOOD BY AUTOMATED COUNT: 50.4 FL (ref 35.9–50)
GLUCOSE BLD-MCNC: 100 MG/DL (ref 65–99)
GLUCOSE BLD-MCNC: 203 MG/DL (ref 65–99)
GLUCOSE BLD-MCNC: 224 MG/DL (ref 65–99)
GLUCOSE BLD-MCNC: 237 MG/DL (ref 65–99)
GLUCOSE BLD-MCNC: 276 MG/DL (ref 65–99)
GLUCOSE BLD-MCNC: 352 MG/DL (ref 65–99)
GLUCOSE SERPL-MCNC: 87 MG/DL (ref 65–99)
HCT VFR BLD AUTO: 31.2 % (ref 42–52)
HGB BLD-MCNC: 9.8 G/DL (ref 14–18)
HGB BLD-MCNC: 9.9 G/DL (ref 14–18)
LV EJECT FRACT  99904: 45
LYMPHOCYTES # BLD AUTO: 0.92 K/UL (ref 1–4.8)
LYMPHOCYTES NFR BLD: 13.9 % (ref 22–41)
MAGNESIUM SERPL-MCNC: 2.1 MG/DL (ref 1.5–2.5)
MANUAL DIFF BLD: NORMAL
MCH RBC QN AUTO: 26.8 PG (ref 27–33)
MCHC RBC AUTO-ENTMCNC: 31.7 G/DL (ref 33.7–35.3)
MCV RBC AUTO: 84.6 FL (ref 81.4–97.8)
MONOCYTES # BLD AUTO: 0.29 K/UL (ref 0–0.85)
MONOCYTES NFR BLD AUTO: 4.4 % (ref 0–13.4)
MORPHOLOGY BLD-IMP: NORMAL
NEUTROPHILS # BLD AUTO: 5.39 K/UL (ref 1.82–7.42)
NEUTROPHILS NFR BLD: 81.7 % (ref 44–72)
NRBC # BLD AUTO: 0 K/UL
NRBC BLD-RTO: 0 /100 WBC
PLATELET # BLD AUTO: 129 K/UL (ref 164–446)
PLATELET BLD QL SMEAR: NORMAL
PMV BLD AUTO: 10 FL (ref 9–12.9)
POTASSIUM SERPL-SCNC: 4.4 MMOL/L (ref 3.6–5.5)
RBC # BLD AUTO: 3.69 M/UL (ref 4.7–6.1)
RBC BLD AUTO: NORMAL
SODIUM SERPL-SCNC: 132 MMOL/L (ref 135–145)
WBC # BLD AUTO: 6.6 K/UL (ref 4.8–10.8)

## 2019-12-18 PROCEDURE — 700102 HCHG RX REV CODE 250 W/ 637 OVERRIDE(OP): Performed by: FAMILY MEDICINE

## 2019-12-18 PROCEDURE — A9270 NON-COVERED ITEM OR SERVICE: HCPCS | Performed by: INTERNAL MEDICINE

## 2019-12-18 PROCEDURE — 93308 TTE F-UP OR LMTD: CPT | Mod: 26 | Performed by: INTERNAL MEDICINE

## 2019-12-18 PROCEDURE — 85027 COMPLETE CBC AUTOMATED: CPT

## 2019-12-18 PROCEDURE — 700105 HCHG RX REV CODE 258: Performed by: INTERNAL MEDICINE

## 2019-12-18 PROCEDURE — 700117 HCHG RX CONTRAST REV CODE 255: Performed by: STUDENT IN AN ORGANIZED HEALTH CARE EDUCATION/TRAINING PROGRAM

## 2019-12-18 PROCEDURE — 700102 HCHG RX REV CODE 250 W/ 637 OVERRIDE(OP): Performed by: STUDENT IN AN ORGANIZED HEALTH CARE EDUCATION/TRAINING PROGRAM

## 2019-12-18 PROCEDURE — 700111 HCHG RX REV CODE 636 W/ 250 OVERRIDE (IP): Performed by: STUDENT IN AN ORGANIZED HEALTH CARE EDUCATION/TRAINING PROGRAM

## 2019-12-18 PROCEDURE — 83735 ASSAY OF MAGNESIUM: CPT

## 2019-12-18 PROCEDURE — 700111 HCHG RX REV CODE 636 W/ 250 OVERRIDE (IP): Performed by: INTERNAL MEDICINE

## 2019-12-18 PROCEDURE — 80048 BASIC METABOLIC PNL TOTAL CA: CPT

## 2019-12-18 PROCEDURE — 82962 GLUCOSE BLOOD TEST: CPT | Mod: 91

## 2019-12-18 PROCEDURE — 700102 HCHG RX REV CODE 250 W/ 637 OVERRIDE(OP): Performed by: INTERNAL MEDICINE

## 2019-12-18 PROCEDURE — 85018 HEMOGLOBIN: CPT

## 2019-12-18 PROCEDURE — A9270 NON-COVERED ITEM OR SERVICE: HCPCS | Performed by: STUDENT IN AN ORGANIZED HEALTH CARE EDUCATION/TRAINING PROGRAM

## 2019-12-18 PROCEDURE — 99233 SBSQ HOSP IP/OBS HIGH 50: CPT | Performed by: INTERNAL MEDICINE

## 2019-12-18 PROCEDURE — 36415 COLL VENOUS BLD VENIPUNCTURE: CPT

## 2019-12-18 PROCEDURE — 700111 HCHG RX REV CODE 636 W/ 250 OVERRIDE (IP): Performed by: FAMILY MEDICINE

## 2019-12-18 PROCEDURE — 770020 HCHG ROOM/CARE - TELE (206)

## 2019-12-18 PROCEDURE — 85007 BL SMEAR W/DIFF WBC COUNT: CPT

## 2019-12-18 PROCEDURE — 700105 HCHG RX REV CODE 258: Performed by: STUDENT IN AN ORGANIZED HEALTH CARE EDUCATION/TRAINING PROGRAM

## 2019-12-18 PROCEDURE — 93308 TTE F-UP OR LMTD: CPT

## 2019-12-18 RX ORDER — CARVEDILOL 12.5 MG/1
12.5 TABLET ORAL 2 TIMES DAILY WITH MEALS
Status: DISCONTINUED | OUTPATIENT
Start: 2019-12-18 | End: 2019-12-21

## 2019-12-18 RX ORDER — HEPARIN SODIUM 5000 [USP'U]/ML
5000 INJECTION, SOLUTION INTRAVENOUS; SUBCUTANEOUS EVERY 8 HOURS
Status: DISCONTINUED | OUTPATIENT
Start: 2019-12-18 | End: 2019-12-19

## 2019-12-18 RX ORDER — INSULIN GLARGINE 100 [IU]/ML
15 INJECTION, SOLUTION SUBCUTANEOUS EVERY EVENING
Status: DISCONTINUED | OUTPATIENT
Start: 2019-12-18 | End: 2019-12-19

## 2019-12-18 RX ADMIN — VALSARTAN 80 MG: 80 TABLET, FILM COATED ORAL at 16:41

## 2019-12-18 RX ADMIN — AMPICILLIN SODIUM AND SULBACTAM SODIUM 3 G: 2; 1 INJECTION, POWDER, FOR SOLUTION INTRAMUSCULAR; INTRAVENOUS at 05:30

## 2019-12-18 RX ADMIN — HUMAN ALBUMIN MICROSPHERES AND PERFLUTREN 3 ML: 10; .22 INJECTION, SOLUTION INTRAVENOUS at 14:24

## 2019-12-18 RX ADMIN — AMPICILLIN SODIUM AND SULBACTAM SODIUM 3 G: 2; 1 INJECTION, POWDER, FOR SOLUTION INTRAMUSCULAR; INTRAVENOUS at 23:58

## 2019-12-18 RX ADMIN — LINEZOLID 600 MG: 600 TABLET, FILM COATED ORAL at 05:31

## 2019-12-18 RX ADMIN — INSULIN GLARGINE 15 UNITS: 100 INJECTION, SOLUTION SUBCUTANEOUS at 16:41

## 2019-12-18 RX ADMIN — HEPARIN SODIUM 5000 UNITS: 5000 INJECTION, SOLUTION INTRAVENOUS; SUBCUTANEOUS at 17:20

## 2019-12-18 RX ADMIN — ATORVASTATIN CALCIUM 40 MG: 40 TABLET, FILM COATED ORAL at 21:07

## 2019-12-18 RX ADMIN — CLOPIDOGREL BISULFATE 75 MG: 75 TABLET ORAL at 21:07

## 2019-12-18 RX ADMIN — HEPARIN SODIUM 5000 UNITS: 5000 INJECTION, SOLUTION INTRAVENOUS; SUBCUTANEOUS at 21:07

## 2019-12-18 RX ADMIN — CARVEDILOL 6.25 MG: 6.25 TABLET, FILM COATED ORAL at 05:31

## 2019-12-18 RX ADMIN — VALSARTAN 80 MG: 80 TABLET, FILM COATED ORAL at 05:31

## 2019-12-18 RX ADMIN — AMPICILLIN SODIUM AND SULBACTAM SODIUM 3 G: 2; 1 INJECTION, POWDER, FOR SOLUTION INTRAMUSCULAR; INTRAVENOUS at 11:50

## 2019-12-18 RX ADMIN — CARVEDILOL 12.5 MG: 12.5 TABLET, FILM COATED ORAL at 16:41

## 2019-12-18 RX ADMIN — ASPIRIN 81 MG: 81 TABLET, COATED ORAL at 05:30

## 2019-12-18 RX ADMIN — AMPICILLIN SODIUM AND SULBACTAM SODIUM 3 G: 2; 1 INJECTION, POWDER, FOR SOLUTION INTRAMUSCULAR; INTRAVENOUS at 16:40

## 2019-12-18 RX ADMIN — HEPARIN SODIUM 1050 UNITS/HR: 5000 INJECTION, SOLUTION INTRAVENOUS at 15:34

## 2019-12-18 RX ADMIN — LINEZOLID 600 MG: 600 TABLET, FILM COATED ORAL at 16:41

## 2019-12-18 RX ADMIN — INSULIN LISPRO 2 UNITS: 100 INJECTION, SOLUTION INTRAVENOUS; SUBCUTANEOUS at 11:52

## 2019-12-18 RX ADMIN — SPIRONOLACTONE 25 MG: 25 TABLET ORAL at 05:31

## 2019-12-18 RX ADMIN — INSULIN HUMAN 5 UNITS: 100 INJECTION, SOLUTION PARENTERAL at 21:17

## 2019-12-18 RX ADMIN — INSULIN HUMAN 3 UNITS: 100 INJECTION, SOLUTION PARENTERAL at 17:20

## 2019-12-18 ASSESSMENT — ENCOUNTER SYMPTOMS
FEVER: 0
SHORTNESS OF BREATH: 1
SENSORY CHANGE: 1
COUGH: 0
CHILLS: 0
SPUTUM PRODUCTION: 0
HEMOPTYSIS: 0

## 2019-12-18 NOTE — PROGRESS NOTES
Assumed care of pt, bedside report received from day RN. Call light within reach. Bed alarm on. Wife at bedside. Addressed POC with pt, no additional questions at this time.

## 2019-12-18 NOTE — PROGRESS NOTES
0700-Report received from KEENA Iyer. Plan of care reviewed with patient, denies any questions. No needs voiced at this time. Call light within reach, bed locked and in lowest position.   Patient on Special Contact Isolation.

## 2019-12-18 NOTE — PROGRESS NOTES
Infectious Disease Progress Note    Author: Angie Gonzalez M.D. Date & Time of service: 2019  2:21 PM    Chief Complaint:  Left lower extremity gangrene, LLE ulceration, and cellulitis.       Interval History:  55 y/o diabetic male admitted with dyspnea. Found to have ischemic CM and above   AF WBC 7.3 planned for bypass-ortho surgery on hold. No current SOB or CP. Pain in LLE controlled. States while in [place] had fever, cough, SOB. Afebrile for one week prior to return home. Declined treatment for LLE while in [place].   AF WBC 6.6 no fever, chills-decreased swelling and erythema left foot-toes remain dry and gangrenous. +episodes CP and SOB  Labs Reviewed, Medications Reviewed, Radiology Reviewed and Wound Reviewed.    Review of Systems:  Review of Systems   Constitutional: Negative for chills and fever.   Respiratory: Positive for shortness of breath. Negative for cough, hemoptysis and sputum production.    Cardiovascular: Positive for leg swelling. Negative for chest pain.   Neurological: Positive for sensory change.   All other systems reviewed and are negative.      Hemodynamics:  Temp (24hrs), Av.6 °C (97.8 °F), Min:36.2 °C (97.1 °F), Max:37.1 °C (98.7 °F)  Temperature: 36.9 °C (98.4 °F)  Pulse  Av.3  Min: 88  Max: 106   Blood Pressure: 112/63       Physical Exam:  Physical Exam  Vitals signs and nursing note reviewed.   Constitutional:       General: He is not in acute distress.     Appearance: He is not ill-appearing or toxic-appearing.   HENT:      Nose: No rhinorrhea.      Mouth/Throat:      Mouth: Mucous membranes are moist.      Pharynx: No posterior oropharyngeal erythema.   Eyes:      General: No scleral icterus.     Extraocular Movements: Extraocular movements intact.      Pupils: Pupils are equal, round, and reactive to light.   Neck:      Musculoskeletal: Neck supple. No neck rigidity.   Cardiovascular:      Rate and Rhythm: Tachycardia present.   Pulmonary:      Effort:  Pulmonary effort is normal. No respiratory distress.      Breath sounds: No stridor. No wheezing or rhonchi.   Abdominal:      General: Bowel sounds are normal. There is no distension.      Palpations: Abdomen is soft.      Tenderness: There is no tenderness. There is no guarding.   Musculoskeletal:         General: Swelling present.      Left lower leg: Edema present.   Skin:     Coloration: Skin is not jaundiced.      Comments: Large deep ulceration dorsum left foot-  Dry gangrene 2nd and 3rd toes, left  Decreased erythema and swelling   Neurological:      General: No focal deficit present.      Mental Status: He is alert and oriented to person, place, and time.   Psychiatric:         Mood and Affect: Mood normal.         Meds:    Current Facility-Administered Medications:   •  influenza vaccine quad  •  carvedilol  •  spironolactone  •  valsartan  •  [COMPLETED] heparin **AND** heparin **AND** heparin **AND** Protocol 440 Heparin Weight Based DO NOT GIVE ANY HEPARIN BOLUS TO STROKE PATIENT **AND** Protocol 440 Heparin Weight Based Discontinue Enoxaparin (Lovenox), Dabigatran (Pradaxa), Rivaroxaban (Xarelto), Apixaban (Eliquis), Edoxaban (Savaysa, Lixiana), Fondaparinux (Arixtra) and Argatroban prior to heparin administration **AND** Protocol 440 Heparin Weight Based Draw baseline aPTT, PT, and platelet count if not already done **AND** Protocol 440 Heparin Weight Based Draw aPTT 6 hours after beginning infusion.  **AND** Protocol 440 Heparin Weight Based Draw Platelet count every three days. Contact MD if platelet is 50% lower than baseline count. **AND** Protocol 440 Heparin Weight Based Record Patient Data **AND** Protocol 440 Heparin Weight Based INSTRUCTIONS **AND** Protocol 440 Heparin Weight Based Review aPTT results 6 hours after infusion is begun as detailed **AND** Protocol 440 Heparin Weight Based Adjust heparin to maintain aPTT between 55-96 sec **AND** Protocol 440 Heparin Weight Based Order aPTT 6  hours after any rate change or hold until aPTT is therapeutic (55-96 seconds) **AND** Protocol 440 Heparin Weight Based Documentation and verification  •  insulin glargine **AND** insulin lispro **AND** Accu-Chek Q6 if NPO **AND** NOTIFY MD and PharmD **AND** glucose **AND** dextrose 10% bolus  •  senna-docusate **AND** polyethylene glycol/lytes **AND** magnesium hydroxide **AND** bisacodyl  •  Respiratory Care per Protocol  •  enalaprilat  •  cloNIDine  •  atorvastatin  •  clopidogrel  •  aspirin  •  ampicillin-sulbactam (UNASYN) IV  •  linezolid    Labs:  Recent Labs     12/16/19 0332 12/16/19  1614 12/17/19  0033 12/18/19  0230 12/18/19  1135   WBC 8.7 6.3 7.3 6.6  --    RBC 3.80* 3.81* 4.16* 3.69*  --    HEMOGLOBIN 10.1* 10.3* 11.3* 9.9* 9.8*   HEMATOCRIT 32.8* 32.3* 35.3* 31.2*  --    MCV 86.3 84.8 84.9 84.6  --    MCH 26.6* 27.0 27.2 26.8*  --    RDW 51.4* 49.7 50.3* 50.4*  --    PLATELETCT 140* 124* 122* 129*  --    MPV 10.0 10.2 10.4 10.0  --    NEUTSPOLYS 81.60*  --  76.20* 81.70*  --    LYMPHOCYTES 12.30*  --  16.20* 13.90*  --    MONOCYTES 5.20  --  6.30 4.40  --    EOSINOPHILS 0.00  --  0.50 0.00  --    BASOPHILS 0.00  --  0.40 0.00  --    RBCMORPHOLO Present  --   --  Normal  --      Recent Labs     12/16/19  0332 12/17/19  0033 12/18/19  0230   SODIUM 125* 133* 132*   POTASSIUM 4.0 4.1 4.4   CHLORIDE 91* 97 100   CO2 29 26 25   GLUCOSE 366* 258* 87   BUN 23* 17 16     Recent Labs     12/16/19 0332 12/17/19  0033 12/18/19  0230   ALBUMIN 2.9* 2.7*  --    TBILIRUBIN 0.4  --   --    ALKPHOSPHAT 85  --   --    TOTPROTEIN 8.1  --   --    ALTSGPT 48  --   --    ASTSGOT 43  --   --    CREATININE 0.88 0.64 0.66       Imaging:  Dx-chest-portable (1 View)    Result Date: 12/16/2019 12/16/2019 3:31 AM HISTORY/REASON FOR EXAM: Shortness of Breath TECHNIQUE/EXAM DESCRIPTION:  Single AP view of the chest. COMPARISON: None FINDINGS: Overlying cardiac leads are present. The cardiac silhouette appears within  normal limits. The mediastinal contour appears within normal limits.  The central pulmonary vasculature appears normal. The lungs appear well expanded bilaterally.  Hazy and reticular bilateral lung base opacities are seen, greater on the right. Mild blunting of the right costophrenic angle is seen suggesting small right effusion. The bony structures appear age-appropriate.     1.  Hazy and reticular bilateral lung base opacities, greater on the right, appearance suggests atelectasis and/or right lung base infiltrate. 2.  Trace right pleural effusion    Dx-foot-complete 3+ Left    Result Date: 12/16/2019 12/16/2019 4:07 AM HISTORY/REASON FOR EXAM: Atraumatic Pain/Swelling/Deformity; Gangrenous second and third toe, nonhealing ulcer to the medial surface of the foot.  Suspect osteomyelitis TECHNIQUE/EXAM DESCRIPTION:  AP, lateral, and oblique views of the LEFT foot. COMPARISON:  August 21, 2019 FINDINGS: There is third toe proximal phalanx neck fracture identified. There is fracture through the base of the second toe proximal phalanx. There is mild ostial lysis at the base of the second toe which is new since prior study. Osteolytic changes at the first metatarsophalangeal joint are seen, slightly more pronounced since prior study. New areas of osteolysis are seen involving the second, third, and fifth metatarsal heads. There are new lytic changes identified at the first tarsometatarsal joint. Atherosclerotic changes are noted. Soft tissue swelling of the forefoot is noted.     1.  Fracture at the neck of the third toe proximal phalanx 2.  Fracture through the base of the second toe proximal phalanx with new osteolysis, appears likely related to pathologic fracture from suspected osteomyelitis. 3.  Osteolytic at the first metatarsophalangeal joint, increased since prior study, appearance favors progressive osteomyelitis. 4.  New osteolytic changes at the first tarsometatarsal joint, appearance most compatible with  osteomyelitis. 5.  New areas of osteolysis involving the second, third, and fifth metatarsal heads, concerning for osteomyelitis 6.  Atherosclerosis    Us-extremity Venous Lower Bilat    Result Date: 2019   Vascular Laboratory  CONCLUSIONS  Normal bilateral superficial and deep venous examination of the lower  extremities.  Prominent left inguinal lymph nodes.  TRELL CASTLE  Exam Date:     2019 07:02  Room #:     Inpatient  Priority:     Routine  Ht (in):             Wt (lb):  Ordering Physician:        REBECCA REARDON  Referring Physician:       [id number] Bertrand  Sonographer:               Bonnie Bryant RVT  Study Type:                Complete Bilateral  Technical Quality:         Adequate  Age:    54    Gender:     M  MRN:    [id number]  :    1965      BSA:  Indications:     Localized swelling, mass and lump, unspecified lower limb  CPT Codes:       23193  ICD Codes:       R22.40  History:         Bilateral swelling  Limitations:  PROCEDURES:  Bilateral lower extremity venous duplex imaging.  The following venous structures were evaluated: common femoral, profunda  femoral, greater saphenous, femoral, popliteal , peroneal and posterior  tibial veins.  Serial compression, color and spectral Doppler flow evaluations were  performed.  FINDINGS:  Bilateral lower extremities -.  No deep venous thrombosis.  Complete color filling and compressibility with normal venous flow dynamics  including spontaneous flow, response to augmentation maneuvers, and  respiratory phasicity.  There are two enlarged lyph nodes seen in the left groin. The largest  measuring 2.0x0.92cm  Reinier Wray  (Electronically Signed)  Final Date:      2019                   08:34    Ct-cta Chest Pulmonary Artery W/ Recons    Result Date: 2019 9:34 AM HISTORY/REASON FOR EXAM:  Shortness of breath and tachycardia TECHNIQUE/EXAM DESCRIPTION: CT angiogram scan for  pulmonary embolism with contrast, with reconstructions. 1.25 mm helical sections were obtained from the lung apices through the lung bases following the rapid bolus administration of 55 mL of Omnipaque 350 nonionic contrast. Thin-section overlapping reconstruction interval was utilized. Coronal reconstructions were generated from the axial data. MIP post processing was performed and utilized for the interpretation. Low dose optimization technique was utilized for this CT exam including automated exposure control and adjustment of the mA and/or kV according to patient size. COMPARISON: None FINDINGS: There are patchy bilateral infiltrates. There are small bilateral pleural effusions. There are no pulmonary nodules seen. There is no evidence of mediastinal or hilar adenopathy. There is atherosclerotic vascular calcification. No large masses are seen within the upper abdomen. There is no evidence of filling defect within the pulmonary arterial system to suggest presence of pulmonary embolus.     1.  No evidence of pulmonary embolus. 2.  Patchy bilateral infiltrates and small bilateral pleural effusions. 3.  Atherosclerotic vascular calcification.    Ec-echocardiogram Complete W/ Cont    Result Date: 12/16/2019  Transthoracic Echo Report Echocardiography Laboratory CONCLUSIONS Severely reduced left ventricular systolic function. Left ventricular ejection fraction is visually estimated to be 30-35%. There appears to be a linear echodensity which is adjacent to the LV apex concerning for possible small LV thrombus. May consider repeat limted echo in 1-2 days for re-evaluation if clinically indicated.  Normal inferior vena cava size and inspiratory collapse. Indeterminate diastolic function. Aortic sclerosis without stenosis. Dr. Segovia is aware of the above findings. TRELL CASTLE Exam Date:          LV EF:        % FINDINGS Left Ventricle Normal left ventricular chamber size. Normal left ventricular wall thickness.  Severely reduced left ventricular systolic function. Left ventricular ejection fraction is visually estimated to be 30-35%. Global hypokinesis with apical dyskinesis. Basal septum appears to have normal contractility.  Indeterminate diastolic function.  Contrast was used to evaluate for thrombus in the left ventricular apex. There appears to be a linear echodensity which is adjacent to the LV apex. LV thrombus cannot be ruled out. Consider repeat limted echo in 1-2 days for re-evaluation if clinically indicated.  Spontaneous contrast is noted. 3 ML of contrast was administered. Existing IV was used. Right Ventricle Normal right ventricular size and systolic function. Right Atrium Normal right atrial size. Normal inferior vena cava size and inspiratory collapse. Left Atrium Mildly dilated left atrium. Left atrial volume index is 35 mL/sq m. Mitral Valve Mitral annular calcification. No mitral stenosis. Mild mitral regurgitation. Aortic Valve Tricuspid aortic valve. Aortic sclerosis without stenosis. No aortic insufficiency. Tricuspid Valve Structurally normal tricuspid valve. No tricuspid stenosis. Trace tricuspid regurgitation. Unable to estimate pulmonary artery pressure due to an inadequate tricuspid regurgitant jet. Pulmonic Valve The pulmonic valve is not well visualized.  No pulmonic stenosis. Trace pulmonic insufficiency. Pericardium No pericardial effusion seen. Aorta Normal aortic root for body surface area. Ascending aorta diameter is 3.3 cm. Caitlin Acosta MD (Electronically Signed) Final Date:     16 December 2019                 12:09    Cl-left Heart Catheterization With Possible Intervention  Result Date: 12/16/2019  Conclusions   1. Severe LV systolic dysfunction by noninvasive evaluation.   2. Severe and diffuse obstructive three-vessel coronary artery disease.   3. Normal LVEDP. Post-Procedure Diagnosis   Ischemic cardiomyopathy. Recommendations   * Medical therapy of coronary artery disease and  ischemic cardiomyopathy. Consider nonurgent and staged revascularization, preferably bypass surgery.   * Would be reasonable to proceed with planned amputation of the foot prior to coronary artery revascularization.  If this path is elected it would be at increased risk of perioperative cardiovascular complications and I would recommend perioperative antiplatelet medication statin therapy beta-blockade and close attention to avoid hemodynamic stressors for excess blood loss/fluid shifts.   Never Coronary Diagnostic Findings   * Left main: Distal tapering with 50% stenosis.   * Left anterior descending: Diffuse atherosclerosis and negative remodeling beginning at the origin.  There are sequential 70% stenosis in the proximal and mid segment of the vessel.  There is subtotal occlusion in the mid to distal segment with AGATHA II flow into the apical LAD. The first diagonal has proximal 70% stenosis and is diffusely diseased and negatively remodeled with 95% stenosis in the lower branch.   * Left circumflex: Mid vessel 70-80% stenosis.  The OM1 has diffuse atherosclerosis there is less than 1.5 mm in size and without significant narrowing.  The OM 2 bifurcates proximally the upper branch has proximal 50% stenosis and is a less than 1.5 mm vessel.  The lower branch is a 2.25 mm vessel and has proximal 85% stenosis.   * Right coronary artery: Dominant, diffuse 60% stenosis in the proximal mid and distal AV groove.  There is a dual PDA.  The first PDA has a mid 80% stenosis and diffuse disease downstream.  The second PDA has proximal 80% stenosis. Cath Hemodynamic Data Pressures     Phase:Baseline     AO (sys/bauman/mean) :  99 mmHg / 61 mmHg ( 73 mmHg )  @ 2:02:13 PM                           90 mmHg / 57 mmHg ( 69 mmHg )  @ 2:06:18 PM                           92 mmHg / 56 mmHg ( 73 mmHg )  @ 2:06:41 PM     LV (sys/bauman/EDP) :  91 mmHg / 8 mmHg / 12 mmHg  @ 2:06:10 PM                          89 mmHg / 8 mmHg / 12 mmHg  @  2:06:13 PM     AO (sys/bauman/mean) HR:  91 bpm  @ 2:02:13 PM                             96 bpm  @ 2:06:18 PM                             96 bpm  @ 2:06:41 PM     LV (sys/bauman/EDP) HR:  96 bpm  @ 2:06:10 PM                            95 bpm  @ 2:06:13 PM     LV (sys/bauman/EDP) DP/DT:  816 mmHg/s  @ 2:06:10 PM                               816 mmHg/s  @ 2:06:13 PM Access Closure   * Sheath Pulled and Post Procedure Forms (Pulses, etc.) Closure Device Deployed. Procedural Details   Correct Patient established (2 Patient Identifiers).   Correct Procedure verified with Patient.   Informed Consent Signed By MD And On Chart.   The risks and benefits of cardiac catheterization as well as the procedure itself, rationale and appropriateness were discussed with the patient. Complications including but not limited to death, stroke, MI, urgent bypass surgery, contrast nephropathy, vascular complications, bleeding and infection were explained to the patient.   Pre-procedure Teaching Performed.   Patient Verbalized Understanding of Procedure.   Updated and Current H & P On The Chart.   The insertion site was disinfected with a Chlorhexidine-based preparation. The antiseptic remained on the insertion site and was allowed to air dry for at least two (2) minutes. The patient was covered with a large sterile drape, with a small opening corresponding to the insertion site. The sterile drape covered the patient from head to toe.   Pre-Anesthesia Assessment & Sedation Plan Completed.   Percutaneous Puncture to right radial artery.   6FR 6FR Slender SWS Glidesheath Sheath Inserted Into right radial artery.   5fr/100cm TIG 4.0 100cm Catheter Inserted by Guidewire.   150cm .035 J Guidewire used.   Left Coronary System Injected & Multi. Views Taken.   Right Coronary System Injected & Multi. Views Taken.   Pullback LV to AO.   Catheter Removed.   No Complications.   Sheath Pulled and Post Procedure Forms (Pulses, etc.) Closure Device Deployed.    Patent Hemostasis Confirmed/Reverse Barbeau Test Done by: MAKAYLA.   Post Procedure Teaching Performed.   Patient Verbalizes Understanding of Teaching. Access Site   * Percutaneous Puncture to right radial artery.   * 6FR 6FR Slender SWS Glidesheath Sheath Inserted Into right radial artery. Sedation Start Time:     13:52 Sedation Stop Time:     14:07 Sedation Total Time:     16 min Case Start Time:     13:59 Procedure(s) Performed   * Hemostasis with Radial Compression Device.   * Art Access - R radial artery.   * Coronary Angio.   * Left Heart Cath. Procedure Medications ------------------------------ Start:     1:52 PM Medication:     Versed Amount:     1 mg Route:     IV Administered By:     Gris Rodriguez Ordered By:     Rosita Liao ------------------------------ Start:     1:52 PM Medication:     Fentanyl Amount:     50 mcg Route:     IV Administered By:     Gris Rodriguez Ordered By:     Rosita Liao ------------------------------ Start:     2:00 PM Medication:     2% Lidocaine Amount:     2 ml Route:     Subcut Administered By:     Rosita Liao Ordered By:     Rosita Liao ------------------------------ Start:     2:01 PM Medication:     Heparin Amount:     5000 units Route:     IA Administered By:     Rosita Liao Ordered By:     Rosita Liao ------------------------------ Start:     2:01 PM Medication:     Nitroglycerin Amount:     100 mcg Route:     IA Administered By:     Rosita Liao Ordered By:     Rosita Liao ------------------------------ Start:     2:01 PM Medication:     Verapamil Amount:     2.5 mg Route:     IA Administered By:     Rosita Liao Ordered By:     Rosita Liao ------------------------------ Start:     2:03 PM Medication:     Nitroglycerin Amount:     240 mcg Route:     IC Administered By:     Rosita Liao Ordered By:     Rosita Liao X-ray Summary ------------------------------ Total Time (min):     1.40 Total Dose (mGy):     293 DAP (cGy*cm):     --- Contrast  "------------------------------ Contrast:     Omnipaque 350 Amount (mL):     25 ml Report Signatures Finalized by Keshawn Becker MD on 12/16/2019 02:27 PM      Micro:  Results     Procedure Component Value Units Date/Time    BLOOD CULTURE [[id number]] Collected:  12/16/19 0432    Order Status:  Completed Specimen:  Blood from Peripheral Updated:  12/17/19 0835     Significant Indicator NEG     Source BLD     Site PERIPHERAL     Culture Result No Growth  Note: Blood cultures are incubated for 5 days and  are monitored continuously.Positive blood cultures  are called to the RN and reported as soon as  they are identified.      Narrative:       Per Hospital Policy: Only change Specimen Src: to \"Line\" if  specified by physician order.  Right Hand    BLOOD CULTURE [[id number]] Collected:  12/16/19 0450    Order Status:  Completed Specimen:  Blood from Peripheral Updated:  12/17/19 0835     Significant Indicator NEG     Source BLD     Site PERIPHERAL     Culture Result No Growth  Note: Blood cultures are incubated for 5 days and  are monitored continuously.Positive blood cultures  are called to the RN and reported as soon as  they are identified.      Narrative:       Per Hospital Policy: Only change Specimen Src: to \"Line\" if  specified by physician order.  No site indicated    URINALYSIS CULTURE, IF INDICATED [[id number]]  (Abnormal) Collected:  12/16/19 0541    Order Status:  Completed Specimen:  Urine Updated:  12/16/19 0555     Color Yellow     Character Clear     Specific Gravity 1.019     Ph 7.0     Glucose >=1000 mg/dL      Ketones Negative mg/dL      Protein 30 mg/dL      Bilirubin Negative     Urobilinogen, Urine 0.2     Nitrite Negative     Leukocyte Esterase Negative     Occult Blood Negative     Micro Urine Req Microscopic          Assessment:  Left lower extremity gangrene, LLE ulceration, and cellulitis  OM left foot, multifocal.   Ischemic cardiomyopathy  Diabetes  Carbapenemase +in stool  PVD    Plan:  Left " lower extremity gangrene, dry  LLE dorsal foot ulceration  Cellulitis, improved  Afebrile  No current leukcytosis  Blood cxs neg  DW Ortho: Had been seen at [facility] and declined amp toes/ BKA prior to trip to [facility]  Per patient while in [place] did not seek any treatment for LLE-told doctors there just to dress it  Imaging +multifocal OM  Continue Zyvox and Unasyn   Amputation tentatively Fri  Duration abx dependent on extent of surgery    CAD  S/p cardiac cath  Plan for CABG  Ischemic CM with EF 15%  CXR pulm edema by my read    Diabetes.   Keep BS under 150 to help control current infection    PVD  WIll impair abx efficacy and wound care  Vascular eval appreciated-no intervention planned    Stool PCR +carbapenemase  Contact isolation  Escalate abx if worsening infection    I have performed a physical exam and reviewed and updated ROS as of today.  In review of yesterday's note dated  112/17/2019, there are no changes except as documented above.

## 2019-12-18 NOTE — CARE PLAN
Problem: Communication  Goal: The ability to communicate needs accurately and effectively will improve  Outcome: PROGRESSING AS EXPECTED  Intervention: [place] patient and significant other/support system to call light to alert staff of needs  Flowsheets (Taken 12/17/2019 4323)  Oriented to:: All of the Following : Location of Bathroom, Visiting Policy, Unit Routine, Call Light and Bedside Controls, Bedside Rail Policy, Smoking Policy, Rights and Responsibilities, Bedside Report, and Patient Education Notebook     Problem: Infection  Goal: Will remain free from infection  Outcome: PROGRESSING AS EXPECTED  Intervention: Implement standard precautions and perform hand washing before and after patient contact  Note:   Contact precautions and hand washing performed before and after pt contact

## 2019-12-18 NOTE — PROGRESS NOTES
[facility] [place] FAMILY MEDICINE PROGRESS NOTE     Attending: Dr. Garcia  Resident: Nichole Morel (PGY-1)  PATIENT: Israel Aviles; [id number]; 1965    ID: 54 y.o. male admitted for left lower extremity gangrene, LLE foot ulceration, subsequently found to have diffuse CAD by [facility] and new heart failure.     SUBJECTIVE: No acute events overnight, Tolerated PO, denies pain.  Patient again states he is okay with proceeding with operative management of foot including BKA if needed.     OBJECTIVE:     Vitals:    12/17/19 1904 12/17/19 2100 12/18/19 0026 12/18/19 0309   BP: 110/64  116/75 127/72   Pulse: (!) 104  93 96   Resp: 17 16 16   Temp: 36.4 °C (97.5 °F)  36.3 °C (97.4 °F) 36.4 °C (97.6 °F)   TempSrc: Temporal  Temporal Temporal   SpO2: 96%  98% 92%   Weight:  77.2 kg (170 lb 3.1 oz)     Height:           Intake/Output Summary (Last 24 hours) at 12/18/2019 0716  Last data filed at 12/18/2019 0309  Gross per 24 hour   Intake 50 ml   Output 600 ml   Net -550 ml       PE:  General: No acute distress, resting comfortably in bed.  HEENT: NC/AT. PERRLA. EOMI. MMM  Cardiovascular: RRR  Respiratory: Symmetrical chest. CTAB  Abdomen: soft, NT/ND,  +BS   EXT:  Dry gangrene 2 and 3rd toe left foot, large ulcer on dorsum left foot   Neuro: Grossly intact    LABS:  Recent Labs     12/16/19  0332 12/16/19  1614 12/17/19  0033 12/18/19  0230   WBC 8.7 6.3 7.3 6.6   RBC 3.80* 3.81* 4.16* 3.69*   HEMOGLOBIN 10.1* 10.3* 11.3* 9.9*   HEMATOCRIT 32.8* 32.3* 35.3* 31.2*   MCV 86.3 84.8 84.9 84.6   MCH 26.6* 27.0 27.2 26.8*   RDW 51.4* 49.7 50.3* 50.4*   PLATELETCT 140* 124* 122* 129*   MPV 10.0 10.2 10.4 10.0   NEUTSPOLYS 81.60*  --  76.20* 81.70*   LYMPHOCYTES 12.30*  --  16.20* 13.90*   MONOCYTES 5.20  --  6.30 4.40   EOSINOPHILS 0.00  --  0.50 0.00   BASOPHILS 0.00  --  0.40 0.00   RBCMORPHOLO Present  --   --  Normal     Recent Labs     12/16/19  0332 12/17/19  0033 12/18/19  0230   SODIUM 125* 133* 132*   POTASSIUM 4.0 4.1 4.4   CHLORIDE  91* 97 100   CO2 29 26 25   BUN 23* 17 16   CREATININE 0.88 0.64 0.66   CALCIUM 8.4* 7.8* 7.6*   MAGNESIUM 1.7 1.6 2.1   PHOSPHORUS 2.2*  --   --    ALBUMIN 2.9* 2.7*  --      Estimated GFR/CRCL = Estimated Creatinine Clearance: 139.7 mL/min (by C-G formula based on SCr of 0.66 mg/dL).  Recent Labs     12/16/19  0332  12/17/19  0033  12/17/19  1830 12/17/19  2119 12/18/19  0230 12/18/19  0537   GLUCOSE 366*  --  258*  --   --   --  87  --    POCGLUCOSE  --    < >  --    < > 352* 203*  --  100*    < > = values in this interval not displayed.     Recent Labs     12/16/19  0332 12/16/19  1614   ASTSGOT 43  --    ALTSGPT 48  --    TBILIRUBIN 0.4  --    ALKPHOSPHAT 85  --    GLOBULIN 5.2*  --    INR  --  1.25*             Recent Labs     12/16/19  1614  12/17/19  0619 12/17/19  1331 12/17/19  1920   INR 1.25*  --   --   --   --    APTT 80.6*   < > 102.0* 83.1* 65.6*    < > = values in this interval not displayed.       MICROBIOLOGY:   Stool PCR + carbapenmase  Blood Cx: NGTD     IMAGING:  None     MEDS:  Current medications reviewed     ASSESSMENT/PLAN:   54 y.o. male admitted for left lower extremity gangrene, LLE foot ulceration, subsequently found to have diffuse CAD by [facility], new heart failure, and possible LV thrombus.       # LLE gangrene, dry  # LLE doral foot ulceration  # Cellulitis  - 6/19 underwent left dirsalus pedis angioplasty, anterior tibial angio and atherectomy, left posterior tibial A angio   - 8/19: foot infection Cx + MSSA and e faecalis tx 6 weeks daptomycin,  - Recent trip to [facility] received linezolid and imipenem   - Surgery postponed 2/2 to NSTEMI/diffuse CAD and new HF, per cards medical optimization for ~ 1 week, surgery Friday if stable, ideally preform procedure under local block   - Stool PCR + carbapenmase  - ID, Cards, wound care following: appreciate recommendations       Plan:  Continue Unasyn 3g TID and Zyvox 600mg BID  Wound care per team   Contact precautions   Contact Limb preservation  to arrange surgery for Friday afternoon     # NSTEMI   # Severe/diffuse 3 vessel CAD   On admission SOB elevated trop 64 and EKG with TWI, Echo demonstrating new heart failure, underwent LHC 12/16 show duffuse/severe disease in LAD, LCx, and RCA.  No intervention was preformed.   Cardiology following      Plan:   telemetry   Coreg, valsartan, spirolactone, asa, Plavix, atorvastatin   3 vs CABG when recovered from LLE ambutation    #Heart Failure with reduced EF  - Likely 2/2 to diffuse CAD, PE work up negative  - ECHO on 12/16 showing new severely reduced LVF of 30-35%, BNP 6000 on admission,   - Cardiology following     Plan:   Coreg 6.25 mg BID, titrate as tolerated   Valsartan 80 mg BID, titrate as tolerated   Spironolactone 25 mg today  ASA, plavix, atorvastatin    Enrolled in HF program     # Possible LV Thrombus   Echo 12/16: demonstrating possible thrombus, heparin drip started 12/16  -repeat echo today, if inconclusive proceed with cardiac MRI, if no evidence of thrombus will d/c heparin drip     # Anemia:  hgb down trending from 11.3-> 9.9, denies melena.  Given that patient is on heparin drip will repeat at noon.       #Type 2 Diabetes  - A1C 8.5  - Hold home  metformin and glipizide  - Lantus 15 u QHS, low dose SSI, Diabetic diet   - Goal BS <150 for infection control      #Hyponatremia  - Correct Na of 129 on admission, currently 132   CTM     #Constipation  No BM since admission  Bowel protocol  Discussed with nursing, instructed to call if any concern of melena         Core Measures   VTE PPx: heparin drip  Abx: Unasyn    Diet: Diabetic  Fluids: No IVFs  Lines and Tube: PIV   Code Status: Full code

## 2019-12-18 NOTE — PROGRESS NOTES
Diabetes education: Pt was seen by Nettie BRINK CDE on 12/17. Please see consult note.  Plan: Pt has no further needs at this time. Will follow blood sugars as pt is now on Lantus 15 units hs with Humalog sliding scale coverage ac and hs with blood sugars of 203 (2 units), 100, and 237 (2 units). Please call [phone] or reorder diabetes education if needs change.    Met with pt this afternoon. Pt states he uses Lantus pens and has insulin, pen needles as well as strips and meter. If pt to go home on fast insulin, please order Humalog kwik pen ( and make sure Humalog is covered vs Novolog).

## 2019-12-18 NOTE — PROGRESS NOTES
Spoke with clay Basilio. The initial heparin drip was discontinued on accident. Per Pharmacist we are to just continue on the maintenance. Will continue to monitor.

## 2019-12-19 LAB
ANION GAP SERPL CALC-SCNC: 8 MMOL/L (ref 0–11.9)
BASOPHILS # BLD AUTO: 0 % (ref 0–1.8)
BASOPHILS # BLD: 0 K/UL (ref 0–0.12)
BUN SERPL-MCNC: 19 MG/DL (ref 8–22)
CALCIUM SERPL-MCNC: 7.6 MG/DL (ref 8.5–10.5)
CHLORIDE SERPL-SCNC: 99 MMOL/L (ref 96–112)
CO2 SERPL-SCNC: 22 MMOL/L (ref 20–33)
CREAT SERPL-MCNC: 0.73 MG/DL (ref 0.5–1.4)
EOSINOPHIL # BLD AUTO: 0 K/UL (ref 0–0.51)
EOSINOPHIL NFR BLD: 0 % (ref 0–6.9)
ERYTHROCYTE [DISTWIDTH] IN BLOOD BY AUTOMATED COUNT: 50.5 FL (ref 35.9–50)
FERRITIN SERPL-MCNC: 401.7 NG/ML (ref 22–322)
GLUCOSE BLD-MCNC: 128 MG/DL (ref 65–99)
GLUCOSE BLD-MCNC: 236 MG/DL (ref 65–99)
GLUCOSE BLD-MCNC: 250 MG/DL (ref 65–99)
GLUCOSE BLD-MCNC: 281 MG/DL (ref 65–99)
GLUCOSE SERPL-MCNC: 183 MG/DL (ref 65–99)
HCT VFR BLD AUTO: 28.1 % (ref 42–52)
HGB BLD-MCNC: 8.9 G/DL (ref 14–18)
HGB RETIC QN AUTO: 31 PG/CELL (ref 29–35)
IMM RETICS NFR: 10 % (ref 9.3–17.4)
IRON SATN MFR SERPL: ABNORMAL % (ref 15–55)
IRON SERPL-MCNC: <10 UG/DL (ref 50–180)
LYMPHOCYTES # BLD AUTO: 0.54 K/UL (ref 1–4.8)
LYMPHOCYTES NFR BLD: 7.8 % (ref 22–41)
MANUAL DIFF BLD: NORMAL
MCH RBC QN AUTO: 27 PG (ref 27–33)
MCHC RBC AUTO-ENTMCNC: 31.7 G/DL (ref 33.7–35.3)
MCV RBC AUTO: 85.2 FL (ref 81.4–97.8)
MONOCYTES # BLD AUTO: 0.36 K/UL (ref 0–0.85)
MONOCYTES NFR BLD AUTO: 5.2 % (ref 0–13.4)
MORPHOLOGY BLD-IMP: NORMAL
NEUTROPHILS # BLD AUTO: 6 K/UL (ref 1.82–7.42)
NEUTROPHILS NFR BLD: 87 % (ref 44–72)
NRBC # BLD AUTO: 0 K/UL
NRBC BLD-RTO: 0 /100 WBC
PLATELET # BLD AUTO: 128 K/UL (ref 164–446)
PLATELET BLD QL SMEAR: NORMAL
PMV BLD AUTO: 10.7 FL (ref 9–12.9)
POTASSIUM SERPL-SCNC: 4.4 MMOL/L (ref 3.6–5.5)
RBC # BLD AUTO: 3.3 M/UL (ref 4.7–6.1)
RBC BLD AUTO: NORMAL
RETICS # AUTO: 0.02 M/UL (ref 0.04–0.06)
RETICS/RBC NFR: 0.7 % (ref 0.8–2.1)
SODIUM SERPL-SCNC: 129 MMOL/L (ref 135–145)
TIBC SERPL-MCNC: 172 UG/DL (ref 250–450)
WBC # BLD AUTO: 6.9 K/UL (ref 4.8–10.8)

## 2019-12-19 PROCEDURE — 85027 COMPLETE CBC AUTOMATED: CPT

## 2019-12-19 PROCEDURE — 82728 ASSAY OF FERRITIN: CPT

## 2019-12-19 PROCEDURE — A9270 NON-COVERED ITEM OR SERVICE: HCPCS | Performed by: STUDENT IN AN ORGANIZED HEALTH CARE EDUCATION/TRAINING PROGRAM

## 2019-12-19 PROCEDURE — 700102 HCHG RX REV CODE 250 W/ 637 OVERRIDE(OP): Performed by: INTERNAL MEDICINE

## 2019-12-19 PROCEDURE — A9270 NON-COVERED ITEM OR SERVICE: HCPCS | Performed by: INTERNAL MEDICINE

## 2019-12-19 PROCEDURE — 700111 HCHG RX REV CODE 636 W/ 250 OVERRIDE (IP): Performed by: INTERNAL MEDICINE

## 2019-12-19 PROCEDURE — 99222 1ST HOSP IP/OBS MODERATE 55: CPT | Performed by: NURSE PRACTITIONER

## 2019-12-19 PROCEDURE — 700111 HCHG RX REV CODE 636 W/ 250 OVERRIDE (IP): Performed by: STUDENT IN AN ORGANIZED HEALTH CARE EDUCATION/TRAINING PROGRAM

## 2019-12-19 PROCEDURE — 700105 HCHG RX REV CODE 258: Performed by: INTERNAL MEDICINE

## 2019-12-19 PROCEDURE — 85007 BL SMEAR W/DIFF WBC COUNT: CPT

## 2019-12-19 PROCEDURE — 700102 HCHG RX REV CODE 250 W/ 637 OVERRIDE(OP): Performed by: STUDENT IN AN ORGANIZED HEALTH CARE EDUCATION/TRAINING PROGRAM

## 2019-12-19 PROCEDURE — 36415 COLL VENOUS BLD VENIPUNCTURE: CPT

## 2019-12-19 PROCEDURE — 770020 HCHG ROOM/CARE - TELE (206)

## 2019-12-19 PROCEDURE — 83550 IRON BINDING TEST: CPT

## 2019-12-19 PROCEDURE — 82962 GLUCOSE BLOOD TEST: CPT | Mod: 91

## 2019-12-19 PROCEDURE — 99232 SBSQ HOSP IP/OBS MODERATE 35: CPT | Performed by: INTERNAL MEDICINE

## 2019-12-19 PROCEDURE — 85046 RETICYTE/HGB CONCENTRATE: CPT

## 2019-12-19 PROCEDURE — 83540 ASSAY OF IRON: CPT

## 2019-12-19 PROCEDURE — 99233 SBSQ HOSP IP/OBS HIGH 50: CPT | Performed by: INTERNAL MEDICINE

## 2019-12-19 PROCEDURE — 97535 SELF CARE MNGMENT TRAINING: CPT

## 2019-12-19 PROCEDURE — 80048 BASIC METABOLIC PNL TOTAL CA: CPT

## 2019-12-19 RX ORDER — INSULIN GLARGINE 100 [IU]/ML
0.2 INJECTION, SOLUTION SUBCUTANEOUS EVERY EVENING
Status: DISCONTINUED | OUTPATIENT
Start: 2019-12-19 | End: 2019-12-19

## 2019-12-19 RX ORDER — INSULIN GLARGINE 100 [IU]/ML
8 INJECTION, SOLUTION SUBCUTANEOUS EVERY EVENING
Status: DISCONTINUED | OUTPATIENT
Start: 2019-12-19 | End: 2019-12-20

## 2019-12-19 RX ADMIN — AMPICILLIN SODIUM AND SULBACTAM SODIUM 3 G: 2; 1 INJECTION, POWDER, FOR SOLUTION INTRAMUSCULAR; INTRAVENOUS at 12:04

## 2019-12-19 RX ADMIN — LINEZOLID 600 MG: 600 TABLET, FILM COATED ORAL at 05:00

## 2019-12-19 RX ADMIN — ASPIRIN 81 MG: 81 TABLET, COATED ORAL at 05:01

## 2019-12-19 RX ADMIN — SPIRONOLACTONE 25 MG: 25 TABLET ORAL at 05:00

## 2019-12-19 RX ADMIN — INSULIN LISPRO 5 UNITS: 100 INJECTION, SOLUTION INTRAVENOUS; SUBCUTANEOUS at 11:58

## 2019-12-19 RX ADMIN — VALSARTAN 80 MG: 80 TABLET, FILM COATED ORAL at 05:00

## 2019-12-19 RX ADMIN — CLOPIDOGREL BISULFATE 75 MG: 75 TABLET ORAL at 20:54

## 2019-12-19 RX ADMIN — AMPICILLIN SODIUM AND SULBACTAM SODIUM 3 G: 2; 1 INJECTION, POWDER, FOR SOLUTION INTRAMUSCULAR; INTRAVENOUS at 04:57

## 2019-12-19 RX ADMIN — CARVEDILOL 12.5 MG: 12.5 TABLET, FILM COATED ORAL at 05:00

## 2019-12-19 RX ADMIN — HEPARIN SODIUM 5000 UNITS: 5000 INJECTION, SOLUTION INTRAVENOUS; SUBCUTANEOUS at 05:00

## 2019-12-19 RX ADMIN — AMPICILLIN SODIUM AND SULBACTAM SODIUM 3 G: 2; 1 INJECTION, POWDER, FOR SOLUTION INTRAMUSCULAR; INTRAVENOUS at 17:09

## 2019-12-19 RX ADMIN — ATORVASTATIN CALCIUM 40 MG: 40 TABLET, FILM COATED ORAL at 20:54

## 2019-12-19 RX ADMIN — INSULIN GLARGINE 8 UNITS: 100 INJECTION, SOLUTION SUBCUTANEOUS at 17:19

## 2019-12-19 RX ADMIN — LINEZOLID 600 MG: 600 TABLET, FILM COATED ORAL at 16:56

## 2019-12-19 RX ADMIN — AMPICILLIN SODIUM AND SULBACTAM SODIUM 3 G: 2; 1 INJECTION, POWDER, FOR SOLUTION INTRAMUSCULAR; INTRAVENOUS at 23:38

## 2019-12-19 RX ADMIN — VALSARTAN 80 MG: 80 TABLET, FILM COATED ORAL at 17:11

## 2019-12-19 RX ADMIN — CARVEDILOL 12.5 MG: 12.5 TABLET, FILM COATED ORAL at 16:56

## 2019-12-19 RX ADMIN — HEPARIN SODIUM 5000 UNITS: 5000 INJECTION, SOLUTION INTRAVENOUS; SUBCUTANEOUS at 13:46

## 2019-12-19 ASSESSMENT — ENCOUNTER SYMPTOMS
COUGH: 0
SPUTUM PRODUCTION: 0
SENSORY CHANGE: 1
CHILLS: 0
HEMOPTYSIS: 0
SHORTNESS OF BREATH: 0
FEVER: 0

## 2019-12-19 NOTE — PROGRESS NOTES
LIMB PRESERVATION SERVICE       53 y/o male with DM, PVD, and gangrene to left foot.       Planning surgery tomorrow with Dr. Zhao pending OR availability for L BKA.   If not will plan for surgery early next week.   NPO at midnight  Hold heparin prophylactic tomorrow    D/W: Patient, son, RN, Dr. Sepulveda, Dr. Zhao, Dr. Morel

## 2019-12-19 NOTE — PROGRESS NOTES
Reason for Consult:  Asked by Dr Donny Mcnally M.D. to see this patient with  [   ]  Patient's PCP: Mj Bai M.D.    CC:   Chief Complaint   Patient presents with   • Weakness   • Shortness of Breath       HPI:      54 year old man with PMH DM2, HTN, PAD s/p angioplasty presents with foot/toe gangrene and found osteomyelitis. Consult for preop evaluation given cardiac marker elevation and EKG changes. At presentation was without symptoms. He described an exercise tolerance that was >4 METS. During workup, patient was found to have  HFrEF 30-35% and obstructive multivessel CAD on [facility]. Undergoing titration OMT.    No complaints today. Tolerating breakfast. Slept well.     Medications / Drug list prior to admission:  No current facility-administered medications on file prior to encounter.      Current Outpatient Medications on File Prior to Encounter   Medication Sig Dispense Refill   • insulin glargine (LANTUS) 100 UNIT/ML Solution Inject 6 Units as instructed every evening as needed. Uses only if above 150      • atorvastatin (LIPITOR) 40 MG Tab Take 40 mg by mouth every evening.     • metformin (GLUCOPHAGE) 1000 MG tablet Take 1,000 mg by mouth 2 times a day, with meals.     • clopidogrel (PLAVIX) 75 MG Tab Take 75 mg by mouth every bedtime.  2   • glipiZIDE (GLUCOTROL) 5 MG Tab Take 5 mg by mouth 2 times a day.     • aspirin 81 MG EC tablet Take 1 Tab by mouth every day. 30 Tab 3       Current list of administered Medications:    Current Facility-Administered Medications:   •  insulin glargine (LANTUS) injection 15 Units, 0.2 Units/kg/day, Subcutaneous, Q EVENING **AND** insulin lispro (HUMALOG) injection 5 Units, 0.2 Units/kg/day, Subcutaneous, TID AC **AND** insulin lispro (HUMALOG) injection 1-6 Units, 1-6 Units, Subcutaneous, 4X/DAY ACHS **AND** Accu-Chek ACHS, , , Q AC AND BEDTIME(S) **AND** NOTIFY MD and PharmD, , , Once **AND** glucose 4 g chewable tablet 16 g, 16 g, Oral, Q15 MIN PRN  **AND** DEXTROSE 10% BOLUS 250 mL, 250 mL, Intravenous, Q15 MIN PRN, Nichole Morel M.D.  •  carvedilol (COREG) tablet 12.5 mg, 12.5 mg, Oral, BID WITH MEALS, Nichole Morel M.D., 12.5 mg at 12/19/19 0500  •  heparin injection 5,000 Units, 5,000 Units, Subcutaneous, Q8HRS, Sarah Cheng M.D., 5,000 Units at 12/19/19 0500  •  spironolactone (ALDACTONE) tablet 25 mg, 25 mg, Oral, Q DAY, Nichole Morel M.D., 25 mg at 12/19/19 0500  •  valsartan (DIOVAN) tablet 80 mg, 80 mg, Oral, TWICE DAILY, Nichole Morel M.D., 80 mg at 12/19/19 0500  •  senna-docusate (PERICOLACE or SENOKOT S) 8.6-50 MG per tablet 2 Tab, 2 Tab, Oral, BID, 2 Tab at 12/16/19 0757 **AND** polyethylene glycol/lytes (MIRALAX) PACKET 1 Packet, 1 Packet, Oral, QDAY PRN **AND** magnesium hydroxide (MILK OF MAGNESIA) suspension 30 mL, 30 mL, Oral, QDAY PRN **AND** bisacodyl (DULCOLAX) suppository 10 mg, 10 mg, Rectal, QDAY PRN, Yanni Ingram M.D.  •  Respiratory Care per Protocol, , Nebulization, Continuous RT, Yanni Ingram M.D.  •  enalaprilat (VASOTEC) injection 1.25 mg, 1.25 mg, Intravenous, Q6HRS PRN, Yanni Ingram M.D.  •  cloNIDine (CATAPRES) tablet 0.1 mg, 0.1 mg, Oral, Q6HRS PRN, Yanni Ingram M.D.  •  atorvastatin (LIPITOR) tablet 40 mg, 40 mg, Oral, Nightly, Yanni Ingram M.D., 40 mg at 12/18/19 2107  •  clopidogrel (PLAVIX) tablet 75 mg, 75 mg, Oral, QHS, Yanni Ingram M.D., 75 mg at 12/18/19 2107  •  aspirin EC (ECOTRIN) tablet 81 mg, 81 mg, Oral, DAILY, Yanni Ingram M.D., 81 mg at 12/19/19 0501  •  ampicillin/sulbactam (UNASYN) 3 g in  mL IVPB, 3 g, Intravenous, Q6HRS, Angie Gonzalez M.D., Stopped at 12/19/19 0527  •  linezolid (ZYVOX) tablet 600 mg, 600 mg, Oral, Q12HRS, Angie Gonzalez M.D., 600 mg at 12/19/19 0500    Past Medical History:   Diagnosis Date   • Diabetes (HCC)        Past Surgical History:   Procedure Laterality Date   • IRRIGATION & DEBRIDEMENT ORTHO Left  6/24/2019    Procedure: IRRIGATION AND DEBRIDEMENT, WOUND-FOOT, BIOLOGIC PLACEMENT, WOUND VAC PLACEMENT;  Surgeon: Charles Chopra M.D.;  Location: SURGERY [facility];  Service: Orthopedics       History reviewed. No pertinent family history.  Patient family history was personally reviewed, no pertinent family history to current presentation    Social History     Socioeconomic History   • Marital status:      Spouse name: Not on file   • Number of children: Not on file   • Years of education: Not on file   • Highest education level: Not on file   Occupational History   • Not on file   Social Needs   • Financial resource strain: Not on file   • Food insecurity:     Worry: Not on file     Inability: Not on file   • Transportation needs:     Medical: Not on file     Non-medical: Not on file   Tobacco Use   • Smoking status: Never Smoker   • Smokeless tobacco: Never Used   Substance and Sexual Activity   • Alcohol use: No   • Drug use: No   • Sexual activity: Not on file   Lifestyle   • Physical activity:     Days per week: Not on file     Minutes per session: Not on file   • Stress: Not on file   Relationships   • Social connections:     Talks on phone: Not on file     Gets together: Not on file     Attends Mosque service: Not on file     Active member of club or organization: Not on file     Attends meetings of clubs or organizations: Not on file     Relationship status: Not on file   • Intimate partner violence:     Fear of current or ex partner: Not on file     Emotionally abused: Not on file     Physically abused: Not on file     Forced sexual activity: Not on file   Other Topics Concern   • Not on file   Social History Narrative   • Not on file       ALLERGIES:  No Known Allergies    Review of systems:  A complete review of symptoms was reviewed with patient. This is reviewed in H&P and PMH. ALL OTHERS reviewed and negative    Physical exam:  Patient Vitals for the past 24 hrs:   BP Temp Temp src  Pulse Resp SpO2 Weight   12/19/19 0808 (!) 89/52 36.4 °C (97.5 °F) Temporal 86 18 97 % --   12/19/19 0700 -- -- -- -- -- -- 77.2 kg (170 lb 3.1 oz)   12/19/19 0456 119/69 36.6 °C (97.9 °F) Temporal 95 18 98 % --   12/19/19 0000 105/64 36.4 °C (97.5 °F) Temporal 90 18 98 % --   12/18/19 2100 104/61 36.7 °C (98 °F) Temporal 95 18 94 % --   12/18/19 1533 112/64 36.8 °C (98.2 °F) Temporal (!) 107 18 97 % --   12/18/19 1107 112/63 36.9 °C (98.4 °F) Temporal 100 18 97 % --     General: No acute distress.   EYES: no jaundice  HEENT: OP clear   Neck: No bruits No JVD.   CVS:  RRR. S1 + S2. No M/R/G. No edema.  Left leg ulcer and gangrenous toes.  Resp: CTAB. No wheezing or crackles/rhonchi.  Abdomen: Soft, NT, ND  Skin: Grossly nothing acute no obvious rashes  Neurological: Alert, Moves all extremities, no cranial nerve defects on limited exam    Data:  Laboratory studies personally reviewed by me:  Recent Results (from the past 24 hour(s))   HGB    Collection Time: 12/18/19 11:35 AM   Result Value Ref Range    Hemoglobin 9.8 (L) 14.0 - 18.0 g/dL   ACCU-CHEK GLUCOSE    Collection Time: 12/18/19 11:50 AM   Result Value Ref Range    Glucose - Accu-Ck 237 (H) 65 - 99 mg/dL   EC-ECHOCARDIOGRAM LTD W/ CONT    Collection Time: 12/18/19  2:23 PM   Result Value Ref Range    Left Ventrical Ejection Fraction 45    ACCU-CHEK GLUCOSE    Collection Time: 12/18/19  4:40 PM   Result Value Ref Range    Glucose - Accu-Ck 224 (H) 65 - 99 mg/dL   ACCU-CHEK GLUCOSE    Collection Time: 12/18/19  9:16 PM   Result Value Ref Range    Glucose - Accu-Ck 276 (H) 65 - 99 mg/dL   CBC WITH DIFFERENTIAL    Collection Time: 12/19/19  2:03 AM   Result Value Ref Range    WBC 6.9 4.8 - 10.8 K/uL    RBC 3.30 (L) 4.70 - 6.10 M/uL    Hemoglobin 8.9 (L) 14.0 - 18.0 g/dL    Hematocrit 28.1 (L) 42.0 - 52.0 %    MCV 85.2 81.4 - 97.8 fL    MCH 27.0 27.0 - 33.0 pg    MCHC 31.7 (L) 33.7 - 35.3 g/dL    RDW 50.5 (H) 35.9 - 50.0 fL    Platelet Count 128 (L) 164 - 446  K/uL    MPV 10.7 9.0 - 12.9 fL    Neutrophils-Polys 87.00 (H) 44.00 - 72.00 %    Lymphocytes 7.80 (L) 22.00 - 41.00 %    Monocytes 5.20 0.00 - 13.40 %    Eosinophils 0.00 0.00 - 6.90 %    Basophils 0.00 0.00 - 1.80 %    Nucleated RBC 0.00 /100 WBC    Neutrophils (Absolute) 6.00 1.82 - 7.42 K/uL    Lymphs (Absolute) 0.54 (L) 1.00 - 4.80 K/uL    Monos (Absolute) 0.36 0.00 - 0.85 K/uL    Eos (Absolute) 0.00 0.00 - 0.51 K/uL    Baso (Absolute) 0.00 0.00 - 0.12 K/uL    NRBC (Absolute) 0.00 K/uL   Basic Metabolic Panel    Collection Time: 12/19/19  2:03 AM   Result Value Ref Range    Sodium 129 (L) 135 - 145 mmol/L    Potassium 4.4 3.6 - 5.5 mmol/L    Chloride 99 96 - 112 mmol/L    Co2 22 20 - 33 mmol/L    Glucose 183 (H) 65 - 99 mg/dL    Bun 19 8 - 22 mg/dL    Creatinine 0.73 0.50 - 1.40 mg/dL    Calcium 7.6 (L) 8.5 - 10.5 mg/dL    Anion Gap 8.0 0.0 - 11.9   ESTIMATED GFR    Collection Time: 12/19/19  2:03 AM   Result Value Ref Range    GFR If African American >60 >60 mL/min/1.73 m 2    GFR If Non African American >60 >60 mL/min/1.73 m 2   DIFFERENTIAL MANUAL    Collection Time: 12/19/19  2:03 AM   Result Value Ref Range    Manual Diff Status PERFORMED    PERIPHERAL SMEAR REVIEW    Collection Time: 12/19/19  2:03 AM   Result Value Ref Range    Peripheral Smear Review see below    PLATELET ESTIMATE    Collection Time: 12/19/19  2:03 AM   Result Value Ref Range    Plt Estimation Decreased    MORPHOLOGY    Collection Time: 12/19/19  2:03 AM   Result Value Ref Range    RBC Morphology Normal    ACCU-CHEK GLUCOSE    Collection Time: 12/19/19  5:05 AM   Result Value Ref Range    Glucose - Accu-Ck 128 (H) 65 - 99 mg/dL       Imaging:  EC-ECHOCARDIOGRAM LTD W/ CONT   Final Result      CL-LEFT HEART CATHETERIZATION WITH POSSIBLE INTERVENTION   Final Result      CT-CTA CHEST PULMONARY ARTERY W/ RECONS   Final Result      1.  No evidence of pulmonary embolus.      2.  Patchy bilateral infiltrates and small bilateral pleural  effusions.      3.  Atherosclerotic vascular calcification.      EC-ECHOCARDIOGRAM COMPLETE W/ CONT   Final Result      US-EXTREMITY VENOUS LOWER BILAT   Final Result      DX-FOOT-COMPLETE 3+ LEFT   Final Result         1.  Fracture at the neck of the third toe proximal phalanx   2.  Fracture through the base of the second toe proximal phalanx with new osteolysis, appears likely related to pathologic fracture from suspected osteomyelitis.   3.  Osteolytic at the first metatarsophalangeal joint, increased since prior study, appearance favors progressive osteomyelitis.   4.  New osteolytic changes at the first tarsometatarsal joint, appearance most compatible with osteomyelitis.   5.  New areas of osteolysis involving the second, third, and fifth metatarsal heads, concerning for osteomyelitis   6.  Atherosclerosis      DX-CHEST-PORTABLE (1 VIEW)   Final Result         1.  Hazy and reticular bilateral lung base opacities, greater on the right, appearance suggests atelectasis and/or right lung base infiltrate.   2.  Trace right pleural effusion        EKG : personally reviewed by me sinus 99, poor R wave prog, TWI - new changes compared to 8/22/19     TTE 12/18/19  CONCLUSIONS  Limited Exam for LV Apical Thrombus.   No thrombus. Mildly reduced left ventricular systolic function.  Left ventricular ejection fraction is visually estimated to be 45%.  There is apical septum and apical akinesis.     Compared to the Prior Echo on 12/16/19: The LVEF has improved from 35%   to 45%. There was no LV thrombus visualized on either study.     TTE 12/16/19  CONCLUSIONS  Severely reduced left ventricular systolic function. Left ventricular   ejection fraction is visually estimated to be 30-35%.   There appears to be a linear echodensity which is adjacent to the LV   apex concerning for possible small LV thrombus. May consider repeat   limted echo in 1-2 days for re-evaluation if clinically indicated.    Normal inferior vena cava size  and inspiratory collapse.  Indeterminate diastolic function.  Aortic sclerosis without stenosis.     Conclusions    1. Severe LV systolic dysfunction by noninvasive evaluation.    2. Severe and diffuse obstructive three-vessel coronary artery disease.    3. Normal LVEDP.     Post-Procedure Diagnosis    Ischemic cardiomyopathy.      Active Problems:    * No active hospital problems. *  Resolved Problems:    * No resolved hospital problems. *      Assessment / Plan:    54 year old man with PMH DM2, HTN, PAD s/p angioplasty presents with toe gangrene and found osteomyelitis. Consult for preop evaluation given cardiac marker elevation and EKG changes. On workup, found new HFrEF 30-35% improved to 45% on repeat and obstructive multivessel CAD.     -cont antiplatelets unless interfere with surgical plans  -can d/c systemic AC based on latest TTE  -with respect to surgery, he seems to be tolerating HF-OMT. If acceptable to surgery would plan for earliest OR Friday with local anesthesia.   -CTS eval to plan for CABG post LLE surgery    I personally discussed his case with  Dr Donny Mcnally M.D.    It is my pleasure to participate in the care of Mr. Aviles.  Please do not hesitate to contact me with questions or concerns.    Mahesh Segovia MD  Cardiologist [facility]

## 2019-12-19 NOTE — PROGRESS NOTES
Infectious Disease Progress Note    Author: Angie Gonzalez M.D. Date & Time of service: 2019  2:16 PM    Chief Complaint:  Left lower extremity gangrene, LLE ulceration, and cellulitis.       Interval History:  53 y/o diabetic male admitted with dyspnea. Found to have ischemic CM and above   AF WBC 7.3 planned for bypass-ortho surgery on hold. No current SOB or CP. Pain in LLE controlled. States while in [place] had fever, cough, SOB. Afebrile for one week prior to return home. Declined treatment for LLE while in [place].   AF WBC 6.6 no fever, chills-decreased swelling and erythema left foot-toes remain dry and gangrenous. +episodes CP and SOB   AF WBC 6.9 up to chair-tentative OR tomorrow-no new complaints  Labs Reviewed, Medications Reviewed, Radiology Reviewed and Wound Reviewed.    Review of Systems:  Review of Systems   Constitutional: Negative for chills and fever.   Respiratory: Negative for cough, hemoptysis, sputum production and shortness of breath.    Cardiovascular: Positive for leg swelling. Negative for chest pain.   Neurological: Positive for sensory change.   All other systems reviewed and are negative.      Hemodynamics:  Temp (24hrs), Av.6 °C (97.9 °F), Min:36.4 °C (97.5 °F), Max:36.8 °C (98.2 °F)  Temperature: 36.7 °C (98 °F)  Pulse  Av  Min: 86  Max: 107   Blood Pressure: (!) 92/50       Physical Exam:  Physical Exam  Vitals signs and nursing note reviewed.   Constitutional:       General: He is not in acute distress.     Appearance: He is not ill-appearing or diaphoretic.   HENT:      Nose: No congestion.      Mouth/Throat:      Mouth: Mucous membranes are moist.      Pharynx: No posterior oropharyngeal erythema.   Eyes:      General: No scleral icterus.        Right eye: No discharge.         Left eye: No discharge.   Neck:      Musculoskeletal: Neck supple.   Cardiovascular:      Rate and Rhythm: Tachycardia present.   Pulmonary:      Effort: Pulmonary effort is  normal. No respiratory distress.      Breath sounds: No stridor. No wheezing or rhonchi.   Abdominal:      General: Bowel sounds are normal. There is no distension.      Palpations: Abdomen is soft.      Tenderness: There is no rebound.   Musculoskeletal:         General: Swelling present.      Left lower leg: Edema present.   Lymphadenopathy:      Cervical: No cervical adenopathy.   Skin:     Coloration: Skin is not jaundiced.      Comments: Large deep ulceration dorsum left foot-  Dry gangrene 2nd and 3rd toes, left  Decreased erythema and swelling   Neurological:      General: No focal deficit present.   Psychiatric:         Mood and Affect: Mood normal.         Meds:    Current Facility-Administered Medications:   •  insulin glargine **AND** insulin lispro **AND** insulin lispro **AND** Accu-Chek ACHS **AND** NOTIFY MD and PharmD **AND** glucose **AND** dextrose 10% bolus  •  carvedilol  •  heparin  •  spironolactone  •  valsartan  •  senna-docusate **AND** polyethylene glycol/lytes **AND** magnesium hydroxide **AND** bisacodyl  •  Respiratory Care per Protocol  •  enalaprilat  •  cloNIDine  •  atorvastatin  •  clopidogrel  •  aspirin  •  ampicillin-sulbactam (UNASYN) IV  •  linezolid    Labs:  Recent Labs     12/17/19  0033 12/18/19  0230 12/18/19  1135 12/19/19  0203   WBC 7.3 6.6  --  6.9   RBC 4.16* 3.69*  --  3.30*   HEMOGLOBIN 11.3* 9.9* 9.8* 8.9*   HEMATOCRIT 35.3* 31.2*  --  28.1*   MCV 84.9 84.6  --  85.2   MCH 27.2 26.8*  --  27.0   RDW 50.3* 50.4*  --  50.5*   PLATELETCT 122* 129*  --  128*   MPV 10.4 10.0  --  10.7   NEUTSPOLYS 76.20* 81.70*  --  87.00*   LYMPHOCYTES 16.20* 13.90*  --  7.80*   MONOCYTES 6.30 4.40  --  5.20   EOSINOPHILS 0.50 0.00  --  0.00   BASOPHILS 0.40 0.00  --  0.00   RBCMORPHOLO  --  Normal  --  Normal     Recent Labs     12/17/19  0033 12/18/19  0230 12/19/19  0203   SODIUM 133* 132* 129*   POTASSIUM 4.1 4.4 4.4   CHLORIDE 97 100 99   CO2 26 25 22   GLUCOSE 258* 87 183*   BUN  17 16 19     Recent Labs     12/17/19  0033 12/18/19  0230 12/19/19  0203   ALBUMIN 2.7*  --   --    CREATININE 0.64 0.66 0.73       Imaging:  Dx-chest-portable (1 View)    Result Date: 12/16/2019 12/16/2019 3:31 AM HISTORY/REASON FOR EXAM: Shortness of Breath TECHNIQUE/EXAM DESCRIPTION:  Single AP view of the chest. COMPARISON: None FINDINGS: Overlying cardiac leads are present. The cardiac silhouette appears within normal limits. The mediastinal contour appears within normal limits.  The central pulmonary vasculature appears normal. The lungs appear well expanded bilaterally.  Hazy and reticular bilateral lung base opacities are seen, greater on the right. Mild blunting of the right costophrenic angle is seen suggesting small right effusion. The bony structures appear age-appropriate.     1.  Hazy and reticular bilateral lung base opacities, greater on the right, appearance suggests atelectasis and/or right lung base infiltrate. 2.  Trace right pleural effusion    Dx-foot-complete 3+ Left    Result Date: 12/16/2019 12/16/2019 4:07 AM HISTORY/REASON FOR EXAM: Atraumatic Pain/Swelling/Deformity; Gangrenous second and third toe, nonhealing ulcer to the medial surface of the foot.  Suspect osteomyelitis TECHNIQUE/EXAM DESCRIPTION:  AP, lateral, and oblique views of the LEFT foot. COMPARISON:  August 21, 2019 FINDINGS: There is third toe proximal phalanx neck fracture identified. There is fracture through the base of the second toe proximal phalanx. There is mild ostial lysis at the base of the second toe which is new since prior study. Osteolytic changes at the first metatarsophalangeal joint are seen, slightly more pronounced since prior study. New areas of osteolysis are seen involving the second, third, and fifth metatarsal heads. There are new lytic changes identified at the first tarsometatarsal joint. Atherosclerotic changes are noted. Soft tissue swelling of the forefoot is noted.     1.  Fracture at the  neck of the third toe proximal phalanx 2.  Fracture through the base of the second toe proximal phalanx with new osteolysis, appears likely related to pathologic fracture from suspected osteomyelitis. 3.  Osteolytic at the first metatarsophalangeal joint, increased since prior study, appearance favors progressive osteomyelitis. 4.  New osteolytic changes at the first tarsometatarsal joint, appearance most compatible with osteomyelitis. 5.  New areas of osteolysis involving the second, third, and fifth metatarsal heads, concerning for osteomyelitis 6.  Atherosclerosis    Us-extremity Venous Lower Bilat    Result Date: 2019   Vascular Laboratory  CONCLUSIONS  Normal bilateral superficial and deep venous examination of the lower  extremities.  Prominent left inguinal lymph nodes.  TRELL CASTLE  Exam Date:     2019 07:02  Room #:     Inpatient  Priority:     Routine  Ht (in):             Wt (lb):  Ordering Physician:        REBECCA REARDON  Referring Physician:       [id number] Bertrand  Sonographer:               Bonnie Bryant RVT  Study Type:                Complete Bilateral  Technical Quality:         Adequate  Age:    54    Gender:     M  MRN:    [id number]  :    1965      BSA:  Indications:     Localized swelling, mass and lump, unspecified lower limb  CPT Codes:       45296  ICD Codes:       R22.40  History:         Bilateral swelling  Limitations:  PROCEDURES:  Bilateral lower extremity venous duplex imaging.  The following venous structures were evaluated: common femoral, profunda  femoral, greater saphenous, femoral, popliteal , peroneal and posterior  tibial veins.  Serial compression, color and spectral Doppler flow evaluations were  performed.  FINDINGS:  Bilateral lower extremities -.  No deep venous thrombosis.  Complete color filling and compressibility with normal venous flow dynamics  including spontaneous flow, response to augmentation maneuvers,  and  respiratory phasicity.  There are two enlarged lyph nodes seen in the left groin. The largest  measuring 2.0x0.92cm  Reinier Wray  (Electronically Signed)  Final Date:      16 December 2019                   08:34    Ct-cta Chest Pulmonary Artery W/ Recons    Result Date: 12/16/2019 12/16/2019 9:34 AM HISTORY/REASON FOR EXAM:  Shortness of breath and tachycardia TECHNIQUE/EXAM DESCRIPTION: CT angiogram scan for pulmonary embolism with contrast, with reconstructions. 1.25 mm helical sections were obtained from the lung apices through the lung bases following the rapid bolus administration of 55 mL of Omnipaque 350 nonionic contrast. Thin-section overlapping reconstruction interval was utilized. Coronal reconstructions were generated from the axial data. MIP post processing was performed and utilized for the interpretation. Low dose optimization technique was utilized for this CT exam including automated exposure control and adjustment of the mA and/or kV according to patient size. COMPARISON: None FINDINGS: There are patchy bilateral infiltrates. There are small bilateral pleural effusions. There are no pulmonary nodules seen. There is no evidence of mediastinal or hilar adenopathy. There is atherosclerotic vascular calcification. No large masses are seen within the upper abdomen. There is no evidence of filling defect within the pulmonary arterial system to suggest presence of pulmonary embolus.     1.  No evidence of pulmonary embolus. 2.  Patchy bilateral infiltrates and small bilateral pleural effusions. 3.  Atherosclerotic vascular calcification.    Ec-echocardiogram Complete W/ Cont    Result Date: 12/16/2019  Transthoracic Echo Report Echocardiography Laboratory CONCLUSIONS Severely reduced left ventricular systolic function. Left ventricular ejection fraction is visually estimated to be 30-35%. There appears to be a linear echodensity which is adjacent to the LV apex concerning for possible small LV  thrombus. May consider repeat limted echo in 1-2 days for re-evaluation if clinically indicated.  Normal inferior vena cava size and inspiratory collapse. Indeterminate diastolic function. Aortic sclerosis without stenosis. Dr. Segovia is aware of the above findings. TRELL CASTLE Exam Date:          LV EF:        % FINDINGS Left Ventricle Normal left ventricular chamber size. Normal left ventricular wall thickness. Severely reduced left ventricular systolic function. Left ventricular ejection fraction is visually estimated to be 30-35%. Global hypokinesis with apical dyskinesis. Basal septum appears to have normal contractility.  Indeterminate diastolic function.  Contrast was used to evaluate for thrombus in the left ventricular apex. There appears to be a linear echodensity which is adjacent to the LV apex. LV thrombus cannot be ruled out. Consider repeat limted echo in 1-2 days for re-evaluation if clinically indicated.  Spontaneous contrast is noted. 3 ML of contrast was administered. Existing IV was used. Right Ventricle Normal right ventricular size and systolic function. Right Atrium Normal right atrial size. Normal inferior vena cava size and inspiratory collapse. Left Atrium Mildly dilated left atrium. Left atrial volume index is 35 mL/sq m. Mitral Valve Mitral annular calcification. No mitral stenosis. Mild mitral regurgitation. Aortic Valve Tricuspid aortic valve. Aortic sclerosis without stenosis. No aortic insufficiency. Tricuspid Valve Structurally normal tricuspid valve. No tricuspid stenosis. Trace tricuspid regurgitation. Unable to estimate pulmonary artery pressure due to an inadequate tricuspid regurgitant jet. Pulmonic Valve The pulmonic valve is not well visualized.  No pulmonic stenosis. Trace pulmonic insufficiency. Pericardium No pericardial effusion seen. Aorta Normal aortic root for body surface area. Ascending aorta diameter is 3.3 cm. Caitlin Acosta MD (Electronically Signed) Final  Date:     16 December 2019                 12:09    Cl-left Heart Catheterization With Possible Intervention  Result Date: 12/16/2019  Conclusions   1. Severe LV systolic dysfunction by noninvasive evaluation.   2. Severe and diffuse obstructive three-vessel coronary artery disease.   3. Normal LVEDP. Post-Procedure Diagnosis   Ischemic cardiomyopathy. Recommendations   * Medical therapy of coronary artery disease and ischemic cardiomyopathy. Consider nonurgent and staged revascularization, preferably bypass surgery.   * Would be reasonable to proceed with planned amputation of the foot prior to coronary artery revascularization.  If this path is elected it would be at increased risk of perioperative cardiovascular complications and I would recommend perioperative antiplatelet medication statin therapy beta-blockade and close attention to avoid hemodynamic stressors for excess blood loss/fluid shifts.   Never Coronary Diagnostic Findings   * Left main: Distal tapering with 50% stenosis.   * Left anterior descending: Diffuse atherosclerosis and negative remodeling beginning at the origin.  There are sequential 70% stenosis in the proximal and mid segment of the vessel.  There is subtotal occlusion in the mid to distal segment with AGATHA II flow into the apical LAD. The first diagonal has proximal 70% stenosis and is diffusely diseased and negatively remodeled with 95% stenosis in the lower branch.   * Left circumflex: Mid vessel 70-80% stenosis.  The OM1 has diffuse atherosclerosis there is less than 1.5 mm in size and without significant narrowing.  The OM 2 bifurcates proximally the upper branch has proximal 50% stenosis and is a less than 1.5 mm vessel.  The lower branch is a 2.25 mm vessel and has proximal 85% stenosis.   * Right coronary artery: Dominant, diffuse 60% stenosis in the proximal mid and distal AV groove.  There is a dual PDA.  The first PDA has a mid 80% stenosis and diffuse disease downstream.  The  second PDA has proximal 80% stenosis. Cath Hemodynamic Data Pressures     Phase:Baseline     AO (sys/bauman/mean) :  99 mmHg / 61 mmHg ( 73 mmHg )  @ 2:02:13 PM                           90 mmHg / 57 mmHg ( 69 mmHg )  @ 2:06:18 PM                           92 mmHg / 56 mmHg ( 73 mmHg )  @ 2:06:41 PM     LV (sys/bauman/EDP) :  91 mmHg / 8 mmHg / 12 mmHg  @ 2:06:10 PM                          89 mmHg / 8 mmHg / 12 mmHg  @ 2:06:13 PM     AO (sys/bauman/mean) HR:  91 bpm  @ 2:02:13 PM                             96 bpm  @ 2:06:18 PM                             96 bpm  @ 2:06:41 PM     LV (sys/bauman/EDP) HR:  96 bpm  @ 2:06:10 PM                            95 bpm  @ 2:06:13 PM     LV (sys/bauman/EDP) DP/DT:  816 mmHg/s  @ 2:06:10 PM                               816 mmHg/s  @ 2:06:13 PM Access Closure   * Sheath Pulled and Post Procedure Forms (Pulses, etc.) Closure Device Deployed. Procedural Details   Correct Patient established (2 Patient Identifiers).   Correct Procedure verified with Patient.   Informed Consent Signed By MD And On Chart.   The risks and benefits of cardiac catheterization as well as the procedure itself, rationale and appropriateness were discussed with the patient. Complications including but not limited to death, stroke, MI, urgent bypass surgery, contrast nephropathy, vascular complications, bleeding and infection were explained to the patient.   Pre-procedure Teaching Performed.   Patient Verbalized Understanding of Procedure.   Updated and Current H & P On The Chart.   The insertion site was disinfected with a Chlorhexidine-based preparation. The antiseptic remained on the insertion site and was allowed to air dry for at least two (2) minutes. The patient was covered with a large sterile drape, with a small opening corresponding to the insertion site. The sterile drape covered the patient from head to toe.   Pre-Anesthesia Assessment & Sedation Plan Completed.   Percutaneous Puncture to right radial  artery.   6FR 6FR Slender SWS Glidesheath Sheath Inserted Into right radial artery.   5fr/100cm TIG 4.0 100cm Catheter Inserted by Guidewire.   150cm .035 J Guidewire used.   Left Coronary System Injected & Multi. Views Taken.   Right Coronary System Injected & Multi. Views Taken.   Pullback LV to AO.   Catheter Removed.   No Complications.   Sheath Pulled and Post Procedure Forms (Pulses, etc.) Closure Device Deployed.   Patent Hemostasis Confirmed/Reverse Barbeau Test Done by: CR.   Post Procedure Teaching Performed.   Patient Verbalizes Understanding of Teaching. Access Site   * Percutaneous Puncture to right radial artery.   * 6FR 6FR Slender SWS Glidesheath Sheath Inserted Into right radial artery. Sedation Start Time:     13:52 Sedation Stop Time:     14:07 Sedation Total Time:     16 min Case Start Time:     13:59 Procedure(s) Performed   * Hemostasis with Radial Compression Device.   * Art Access - R radial artery.   * Coronary Angio.   * Left Heart Cath. Procedure Medications ------------------------------ Start:     1:52 PM Medication:     Versed Amount:     1 mg Route:     IV Administered By:     Gris Rordiguez Ordered By:     Rosita Liao ------------------------------ Start:     1:52 PM Medication:     Fentanyl Amount:     50 mcg Route:     IV Administered By:     Gris Rodriguez Ordered By:     Rosita Liao ------------------------------ Start:     2:00 PM Medication:     2% Lidocaine Amount:     2 ml Route:     Subcut Administered By:     Rosita Liao Ordered By:     Rosita Liao ------------------------------ Start:     2:01 PM Medication:     Heparin Amount:     5000 units Route:     IA Administered By:     Rosita Liao Ordered By:     Rosita Liao ------------------------------ Start:     2:01 PM Medication:     Nitroglycerin Amount:     100 mcg Route:     IA Administered By:     Rosita Liao Ordered By:     Rosita Liao ------------------------------ Start:     2:01 PM Medication:      "Verapamil Amount:     2.5 mg Route:     IA Administered By:     Rosita Liao Ordered By:     Rosita Liao ------------------------------ Start:     2:03 PM Medication:     Nitroglycerin Amount:     240 mcg Route:     IC Administered By:     Rosita Liao Ordered By:     Rosita Liao X-ray Summary ------------------------------ Total Time (min):     1.40 Total Dose (mGy):     293 DAP (cGy*cm):     --- Contrast ------------------------------ Contrast:     Omnipaque 350 Amount (mL):     25 ml Report Signatures Finalized by Keshawn Becker MD on 12/16/2019 02:27 PM      Micro:  Results     Procedure Component Value Units Date/Time    BLOOD CULTURE [[id number]] Collected:  12/16/19 0432    Order Status:  Completed Specimen:  Blood from Peripheral Updated:  12/17/19 0835     Significant Indicator NEG     Source BLD     Site PERIPHERAL     Culture Result No Growth  Note: Blood cultures are incubated for 5 days and  are monitored continuously.Positive blood cultures  are called to the RN and reported as soon as  they are identified.      Narrative:       Per Hospital Policy: Only change Specimen Src: to \"Line\" if  specified by physician order.  Right Hand    BLOOD CULTURE [[id number]] Collected:  12/16/19 0450    Order Status:  Completed Specimen:  Blood from Peripheral Updated:  12/17/19 0835     Significant Indicator NEG     Source BLD     Site PERIPHERAL     Culture Result No Growth  Note: Blood cultures are incubated for 5 days and  are monitored continuously.Positive blood cultures  are called to the RN and reported as soon as  they are identified.      Narrative:       Per Hospital Policy: Only change Specimen Src: to \"Line\" if  specified by physician order.  No site indicated    URINALYSIS CULTURE, IF INDICATED [[id number]]  (Abnormal) Collected:  12/16/19 0541    Order Status:  Completed Specimen:  Urine Updated:  12/16/19 0555     Color Yellow     Character Clear     Specific Gravity 1.019     Ph 7.0     " Glucose >=1000 mg/dL      Ketones Negative mg/dL      Protein 30 mg/dL      Bilirubin Negative     Urobilinogen, Urine 0.2     Nitrite Negative     Leukocyte Esterase Negative     Occult Blood Negative     Micro Urine Req Microscopic          Assessment:  Left lower extremity gangrene, LLE ulceration, and cellulitis  OM left foot, multifocal.   Ischemic cardiomyopathy  Diabetes  Carbapenemase +in stool  PVD    Plan:  Left lower extremity gangrene, dry  LLE dorsal foot ulceration  Cellulitis, improved  Afebrile  No current leukcytosis  Blood cxs neg  DW Ortho: Had been seen at [facility] and declined amp toes/ BKA prior to trip to [facility]  Per patient while in [facility] did not seek any treatment for LLE-told doctors there just to dress it  Imaging +multifocal OM  Continue Zyvox and Unasyn   Amputation tentatively Fri  Duration abx dependent on extent of surgery    CAD  S/p cardiac cath  Plan for CABG  Ischemic CM with EF 15%  CXR pulm edema by my read    Diabetes.   Keep BS under 150 to help control current infection    PVD  WIll impair abx efficacy and wound care  Vascular eval appreciated-no intervention planned    Stool PCR +carbapenemase  Contact isolation  Escalate abx if worsening infection    I have performed a physical exam and reviewed and updated ROS as of today.  In review of yesterday's note dated  12/18/2019, there are no changes except as documented above.

## 2019-12-19 NOTE — DISCHARGE SUMMARY
[facility] FAMILY MEDICINE ADULT DISCHARGE SUMMARY     Admit Date:  12/16/2019       Discharge Date:       Service:   [facility] Family Medicine Inpatient Team  Attending Physician(s):   Dr. Wilberto WEBER    Resident(s):  Sarah Cheng M.D (PGY-2), Teresa Canas M.D (PGY-1)    Primary Diagnosis:   Gangrene left lower extremity  Osteomyelitis LLE    Secondary Diagnoses:                Peripheral vascular disease, type 2 diabetes, history CVA, coronary artery disease, Anemia, NSTEMI    HPI:      54-year-old male with past medical history of CVA, type 2 diabetes presents with left foot ulcer and to black toes.  Patient initially suffered ulcer to left foot in March 2019.  He underwent angioplasty of left lower extremity 619.  8/19 he was hospitalized for foot infection cultures growing MSSA and E faecalis.  He underwent 6 weeks course of IV daptomycin at that time and was recommended to get a BKA which he declined.  Does have a history of stool PCR positive for carbapenmase.    Hospital Summary      Exam and images consistent with dry  Gangrene of the first and second toe of left foot with  Infected nonhealing diabetic ulcer on dorsum of left foot.  X-ray demonstrating osteomyelitis.  Blood cultures negative ID was consulted and started patient on Unasyn and oral Zyvox.  Patient also complaining of shortness of breath found to have elevated troponin and diffuse T wave inversion on EKG.  Echo demonstrating severely reduced left ventricular function EF 30-35%.  Cardiology was consulted and took patient for left heart cath where he was found to have diffuse/severe atherosclerosis of right coronary artery, LAD, and circumflex artery.  Given new onset heart failure and left heart cath findings patient was started on metoprolol, spironolactone, and valsartan.  Patient was continued on 48-hour heparin drip given concern for NSTEMI and possible left ventricle thrombus.  Repeat echo 2 days later showed no evidence of thrombus and EF improved  to 45%.  Surgery was delayed until 12/20 to medically optimize patient from a cardiac standpoint.  Patient underwent left leg BKA on 12/20.  Tolerated the procedure well.  Antibiotics were discontinued.  Cardiothoracic surgery was consulted to evaluate for three-vessel CABG ultimately decided against the procedure given very poor distal targets for bypass.  The patient also has extensive collateral circulation.  Sugars were difficult to manage while inpatient.  At time of discharge he was stable on Lantus 17 units nightly, glipizide and metformin.  Following surgery patient with episodes of dizziness and hypotension his carvedilol was adjust with improvement in symptoms.  This may need further titration an an outpatient. Based on the recommendations of PT and OT and Physiatry he will be discharged to an acute rehab facility to complete his recovery.      Consultants:      Cardiology: Dr. Segovia  CT surgery: Dr. Mendoza  Orthopedics: Dr. Spencer   Vascular: Dr. Chatterjee   ID: Dr. Gonzalez  Physiatry: Dr. Bai    Procedures:        Echo 12/16  Severely reduced left ventricular systolic function. Left ventricular   ejection fraction is visually estimated to be 30-35%.   There appears to be a linear echodensity which is adjacent to the LV   apex concerning for possible small LV thrombus.    Echo 12/18  No thrombus. Mildly reduced left ventricular systolic function.  Left ventricular ejection fraction is visually estimated to be 45%.  There is apical septum and apical akinesis.    CTA Chest: no PNA    US LE U/S: negative    Left foot xray:  1.  Fracture at the neck of the third toe proximal phalanx  2.  Fracture through the base of the second toe proximal phalanx with new osteolysis, appears likely related to pathologic fracture from suspected osteomyelitis.  3.  Osteolytic at the first metatarsophalangeal joint, increased since prior study, appearance favors progressive osteomyelitis.  4.  New osteolytic changes at the  first tarsometatarsal joint, appearance most compatible with osteomyelitis.  5.  New areas of osteolysis involving the second, third, and fifth metatarsal heads, concerning for osteomyelitis  6.  Atherosclerosis    Imaging/ Testing:      Blood Cx: Negative   Stool PCR + Carbapenemase      Discharge Medications:         Israel Aviles   Home Medication Instructions KAROLINE:[id number]    Printed on:12/19/19 8771   Medication Information                      aspirin 81 MG EC tablet  Take 1 Tab by mouth every day.             atorvastatin (LIPITOR) 40 MG Tab  Take 40 mg by mouth every evening.             clopidogrel (PLAVIX) 75 MG Tab  Take 75 mg by mouth every bedtime.             glipiZIDE (GLUCOTROL) 5 MG Tab  Take 5 mg by mouth 2 times a day.             insulin glargine (LANTUS) 100 UNIT/ML Solution  Inject 6 Units as instructed every evening as needed. Uses only if above 150              metformin (GLUCOPHAGE) 1000 MG tablet  Take 1,000 mg by mouth 2 times a day, with meals.                 Disposition:   Acute Rehab Facility     Diet:   Diabetic, low sodium     Activity:   As tolerated, permitted by PT    Instructions:      Please follow up with PCP in 1 week after D/C. Follow up Ortho Surgery in ~3 weeks for suture removal. Follow up needed with Cardiology as O/P.   The patient was instructed to return to the ER in the event of worsening symptoms. I have counseled the patient on the importance of compliance and the patient has agreed to proceed with all medical recommendations and follow up plan indicated above.   The patient understands that all medications come with benefits and risks. Risks may include permanent injury or death and these risks can be minimized with close reassessment and monitoring.        Please CC: Mj Bai M.D.    Follow up appointment details :        PCP in 1 week    Othropedic Surgery in 3 weeks for suture removal and wound check    Cardiology follow up will need to be  scheduled. Please call within 1 week after discharge to set up appointment    Pending Studies:        none      Teresa Canas M.D.   PGY-1  [facility]   [phone]

## 2019-12-19 NOTE — PROGRESS NOTES
[facility] FAMILY MEDICINE PROGRESS NOTE     Attending: Dr. Garcia  Resident: Nichole Morel (PGY-1)  PATIENT: Israel Aviles; [id number]; 1965    ID: 54 y.o. male admitted for left lower extremity gangrene, LLE foot ulceration, subsequently found to have diffuse CAD by [facility], new heart failure.    SUBJECTIVE: No acute events overnight.  Feels well this AM.  No pain.  Ambulating with walker.  Denies CP, SOB,dizziness.  BM with light colored stools.  Denies abdominal pain.     OBJECTIVE:     Vitals:    12/18/19 1533 12/18/19 2100 12/19/19 0000 12/19/19 0456   BP: 112/64 104/61 105/64 119/69   Pulse: (!) 107 95 90 95   Resp: 18 18 18 18   Temp: 36.8 °C (98.2 °F) 36.7 °C (98 °F) 36.4 °C (97.5 °F) 36.6 °C (97.9 °F)   TempSrc: Temporal Temporal Temporal Temporal   SpO2: 97% 94% 98% 98%   Weight:       Height:           Intake/Output Summary (Last 24 hours) at 12/19/2019 0700  Last data filed at 12/18/2019 1710  Gross per 24 hour   Intake 2788.33 ml   Output --   Net 2788.33 ml       PE:  General: No acute distress, resting comfortably in bed.  HEENT:PERRLA, EOMI  Cardiovascular: RRR   Respiratory: CTABL  Abdomen: soft, NT/ND, no masses, +BS   EXT:  Gangrene 2nd and 3rd toe LLE, large ulcer on dorsum of foot   Neuro: non focal with no numbness, tingling or changes in sensation    LABS:  Recent Labs     12/17/19  0033 12/18/19  0230 12/18/19  1135 12/19/19  0203   WBC 7.3 6.6  --  6.9   RBC 4.16* 3.69*  --  3.30*   HEMOGLOBIN 11.3* 9.9* 9.8* 8.9*   HEMATOCRIT 35.3* 31.2*  --  28.1*   MCV 84.9 84.6  --  85.2   MCH 27.2 26.8*  --  27.0   RDW 50.3* 50.4*  --  50.5*   PLATELETCT 122* 129*  --  128*   MPV 10.4 10.0  --  10.7   NEUTSPOLYS 76.20* 81.70*  --  87.00*   LYMPHOCYTES 16.20* 13.90*  --  7.80*   MONOCYTES 6.30 4.40  --  5.20   EOSINOPHILS 0.50 0.00  --  0.00   BASOPHILS 0.40 0.00  --  0.00   RBCMORPHOLO  --  Normal  --  Normal     Recent Labs     12/17/19  0033 12/18/19  0230 12/19/19  0203   SODIUM 133* 132* 129*    POTASSIUM 4.1 4.4 4.4   CHLORIDE 97 100 99   CO2 26 25 22   BUN 17 16 19   CREATININE 0.64 0.66 0.73   CALCIUM 7.8* 7.6* 7.6*   MAGNESIUM 1.6 2.1  --    ALBUMIN 2.7*  --   --      Estimated GFR/CRCL = Estimated Creatinine Clearance: 126.3 mL/min (by C-G formula based on SCr of 0.73 mg/dL).  Recent Labs     12/17/19  0033  12/18/19  0230  12/18/19  1640 12/18/19  2116 12/19/19  0203 12/19/19  0505   GLUCOSE 258*  --  87  --   --   --  183*  --    POCGLUCOSE  --    < >  --    < > 224* 276*  --  128*    < > = values in this interval not displayed.     Recent Labs     12/16/19  1614   INR 1.25*             Recent Labs     12/16/19  1614  12/17/19  0619 12/17/19  1331 12/17/19  1920   INR 1.25*  --   --   --   --    APTT 80.6*   < > 102.0* 83.1* 65.6*    < > = values in this interval not displayed.       MICROBIOLOGY:   Blood Cx: NGTD    IMAGING:   Echo: improve ED 45%, no LV thrombus    MEDS:  Current medications reviewed     ASSESSMENT/PLAN:   54 y.o. male admitted for left lower extremity gangrene, LLE foot ulceration, subsequently found to have diffuse CAD by [facility], new heart failure.     # LLE gangrene, dry  # LLE doral foot ulceration  # Osteomyelitis  # Cellulitis  - 6/19 underwent left dirsalus pedis angioplasty, anterior tibial angio and atherectomy, left posterior tibial A angio   - 8/19: foot infection Cx + MSSA and e faecalis tx 6 weeks daptomycin,  - Recent trip to [facility] received linezolid and imipenem   - Surgery postponed 2/2 to NSTEMI/diffuse CAD and new HF, per cards medical optimization for ~ 1 week, surgery Friday if stable, ideally preform procedure under local block   - Stool PCR + carbapenmase  - ID, Cards, wound care following: appreciate recommendations       Plan:  Continue Unasyn 3g TID and Zyvox 600mg BID  Wound care per team   Contact precautions   Limb preservation arranging surgery: tentatively planned for Friday, unclear plan for anesthesia at this time.      # NSTEMI   # Severe/diffuse 3  vessel CAD   On admission SOB elevated trop 64 and EKG with TWI, Echo demonstrating new heart failure, underwent LHC 12/16 show duffuse/severe disease in LAD, LCx, and RCA.  No intervention was preformed.   Cardiology following      Plan:   telemetry   Coreg, valsartan, spirolactone, asa, Plavix, atorvastatin   3 vs CABG when recovered from LLE ambutation     #Heart Failure with reduced EF  - Likely 2/2 to diffuse CAD, PE work up negative  - ECHO on 12/16 showing new severely reduced LVF of 30-35%, BNP 6000 on admission, Repeat echo 12/18  EF 45%  - Cardiology following      Plan:   Coreg 6.25 mg BID, titrate as tolerated   Valsartan 80 mg BID, titrate as tolerated   Spironolactone 25 mg today  ASA, plavix, atorvastatin    Enrolled in HF program      # Possible LV Thrombus   Echo 12/16: demonstrating possible thrombus, heparin drip started 12/16, repeat echo 12/18 no evidence of thrombus.  Heparin d/c     # Anemia:  Unclear why hgb continues to drop pt was on heparin drip but no evidence of HIT, denies melana, is not on fluids so unlikely dilutional. Mostly likely anemia of chronic disease.  Per chart review appears baseline is 10-11.    hgb down trending from 11.3-> 9.9->8.9  - Iron panel with reticulocyte count.     #Type 2 Diabetes  - A1C 8.5  - Hold home  metformin and glipizide  - Lantus 15 u QHS, Low dose Correctional, Diabetic diet   - ADD nutritional coverage, 5 units AC  - Goal BS <150 for infection control      #Hyponatremia  - Correct Na of 129 on admission, currently 132   CTM      #Constipation  BM yesterday  Bowel protocol     Core Measures   VTE PPx: heparin ppx  Abx: Unasyn, zyvox  Diet: Diabetic  Fluids: No IVFs  Lines and Tube: PIV   Code Status: Full code

## 2019-12-19 NOTE — DISCHARGE PLANNING
Anticipated Discharge Disposition: TBD post L BKA 12/20    Action: met with pt at bedside. Pt used a cane PTA, lives with his wife in a single story home. Pt reports no financial barriers to DC, DME, drug/etoh use.     Barriers to Discharge: medical and surgical clearance    Plan: F/U with DC needs post L BKA 12/20.      Length of Stay: 3

## 2019-12-19 NOTE — THERAPY
Physical Therapy Contact Note    Per chart review surgery tentatively planned for 12/20. Will continue to hold PT eval until after procedure for accurate assessment of needs. Provided patient education regarding importance of mobility in acute setting and therapeutic exercise program. DCing order as current order for cardiac rehab education following cardiac cath but patient pending CABG as medically appropriate. Please re-order PT mobility consult as indicated.    Shania Brown, PT, DPT  [phone]

## 2019-12-20 ENCOUNTER — ANESTHESIA EVENT (OUTPATIENT)
Dept: SURGERY | Facility: MEDICAL CENTER | Age: 54
DRG: 616 | End: 2019-12-20
Payer: COMMERCIAL

## 2019-12-20 ENCOUNTER — ANESTHESIA (OUTPATIENT)
Dept: SURGERY | Facility: MEDICAL CENTER | Age: 54
DRG: 616 | End: 2019-12-20
Payer: COMMERCIAL

## 2019-12-20 LAB
ABO + RH BLD: NORMAL
ABO GROUP BLD: NORMAL
ANION GAP SERPL CALC-SCNC: 8 MMOL/L (ref 0–11.9)
ANISOCYTOSIS BLD QL SMEAR: ABNORMAL
BASOPHILS # BLD AUTO: 0 % (ref 0–1.8)
BASOPHILS # BLD: 0 K/UL (ref 0–0.12)
BLD GP AB SCN SERPL QL: NORMAL
BUN SERPL-MCNC: 22 MG/DL (ref 8–22)
CALCIUM SERPL-MCNC: 7.7 MG/DL (ref 8.5–10.5)
CHLORIDE SERPL-SCNC: 100 MMOL/L (ref 96–112)
CO2 SERPL-SCNC: 22 MMOL/L (ref 20–33)
CREAT SERPL-MCNC: 0.88 MG/DL (ref 0.5–1.4)
EOSINOPHIL # BLD AUTO: 0.06 K/UL (ref 0–0.51)
EOSINOPHIL NFR BLD: 0.9 % (ref 0–6.9)
ERYTHROCYTE [DISTWIDTH] IN BLOOD BY AUTOMATED COUNT: 50.6 FL (ref 35.9–50)
GLUCOSE BLD-MCNC: 117 MG/DL (ref 65–99)
GLUCOSE BLD-MCNC: 121 MG/DL (ref 65–99)
GLUCOSE BLD-MCNC: 127 MG/DL (ref 65–99)
GLUCOSE BLD-MCNC: 144 MG/DL (ref 65–99)
GLUCOSE BLD-MCNC: 177 MG/DL (ref 65–99)
GLUCOSE SERPL-MCNC: 251 MG/DL (ref 65–99)
HCT VFR BLD AUTO: 28.3 % (ref 42–52)
HEMOCCULT STL QL: NEGATIVE
HGB BLD-MCNC: 9.2 G/DL (ref 14–18)
LYMPHOCYTES # BLD AUTO: 0.96 K/UL (ref 1–4.8)
LYMPHOCYTES NFR BLD: 15 % (ref 22–41)
MAGNESIUM SERPL-MCNC: 1.9 MG/DL (ref 1.5–2.5)
MANUAL DIFF BLD: NORMAL
MCH RBC QN AUTO: 27.4 PG (ref 27–33)
MCHC RBC AUTO-ENTMCNC: 32.5 G/DL (ref 33.7–35.3)
MCV RBC AUTO: 84.2 FL (ref 81.4–97.8)
MONOCYTES # BLD AUTO: 0.4 K/UL (ref 0–0.85)
MONOCYTES NFR BLD AUTO: 6.2 % (ref 0–13.4)
MORPHOLOGY BLD-IMP: NORMAL
NEUTROPHILS # BLD AUTO: 4.99 K/UL (ref 1.82–7.42)
NEUTROPHILS NFR BLD: 77.9 % (ref 44–72)
NRBC # BLD AUTO: 0 K/UL
NRBC BLD-RTO: 0 /100 WBC
PLATELET # BLD AUTO: 129 K/UL (ref 164–446)
PLATELET BLD QL SMEAR: NORMAL
PMV BLD AUTO: 10.6 FL (ref 9–12.9)
POLYCHROMASIA BLD QL SMEAR: NORMAL
POTASSIUM SERPL-SCNC: 5.2 MMOL/L (ref 3.6–5.5)
RBC # BLD AUTO: 3.36 M/UL (ref 4.7–6.1)
RBC BLD AUTO: PRESENT
RH BLD: NORMAL
SODIUM SERPL-SCNC: 130 MMOL/L (ref 135–145)
WBC # BLD AUTO: 6.4 K/UL (ref 4.8–10.8)

## 2019-12-20 PROCEDURE — 83735 ASSAY OF MAGNESIUM: CPT

## 2019-12-20 PROCEDURE — 700102 HCHG RX REV CODE 250 W/ 637 OVERRIDE(OP): Performed by: ANESTHESIOLOGY

## 2019-12-20 PROCEDURE — 770020 HCHG ROOM/CARE - TELE (206)

## 2019-12-20 PROCEDURE — A9270 NON-COVERED ITEM OR SERVICE: HCPCS | Performed by: STUDENT IN AN ORGANIZED HEALTH CARE EDUCATION/TRAINING PROGRAM

## 2019-12-20 PROCEDURE — 700101 HCHG RX REV CODE 250: Performed by: ANESTHESIOLOGY

## 2019-12-20 PROCEDURE — 86901 BLOOD TYPING SEROLOGIC RH(D): CPT

## 2019-12-20 PROCEDURE — 500367 HCHG DRAIN KIT, HEMOVAC: Performed by: ORTHOPAEDIC SURGERY

## 2019-12-20 PROCEDURE — A9270 NON-COVERED ITEM OR SERVICE: HCPCS | Performed by: ANESTHESIOLOGY

## 2019-12-20 PROCEDURE — 700102 HCHG RX REV CODE 250 W/ 637 OVERRIDE(OP): Performed by: STUDENT IN AN ORGANIZED HEALTH CARE EDUCATION/TRAINING PROGRAM

## 2019-12-20 PROCEDURE — 160039 HCHG SURGERY MINUTES - EA ADDL 1 MIN LEVEL 3: Performed by: ORTHOPAEDIC SURGERY

## 2019-12-20 PROCEDURE — 82272 OCCULT BLD FECES 1-3 TESTS: CPT

## 2019-12-20 PROCEDURE — 700105 HCHG RX REV CODE 258: Performed by: INTERNAL MEDICINE

## 2019-12-20 PROCEDURE — 0Y6J0Z1 DETACHMENT AT LEFT LOWER LEG, HIGH, OPEN APPROACH: ICD-10-PCS | Performed by: ORTHOPAEDIC SURGERY

## 2019-12-20 PROCEDURE — 160048 HCHG OR STATISTICAL LEVEL 1-5: Performed by: ORTHOPAEDIC SURGERY

## 2019-12-20 PROCEDURE — 80048 BASIC METABOLIC PNL TOTAL CA: CPT

## 2019-12-20 PROCEDURE — 36415 COLL VENOUS BLD VENIPUNCTURE: CPT

## 2019-12-20 PROCEDURE — 85007 BL SMEAR W/DIFF WBC COUNT: CPT

## 2019-12-20 PROCEDURE — 99223 1ST HOSP IP/OBS HIGH 75: CPT | Performed by: THORACIC SURGERY (CARDIOTHORACIC VASCULAR SURGERY)

## 2019-12-20 PROCEDURE — 88307 TISSUE EXAM BY PATHOLOGIST: CPT

## 2019-12-20 PROCEDURE — 500881 HCHG PACK, EXTREMITY: Performed by: ORTHOPAEDIC SURGERY

## 2019-12-20 PROCEDURE — 700111 HCHG RX REV CODE 636 W/ 250 OVERRIDE (IP): Performed by: ANESTHESIOLOGY

## 2019-12-20 PROCEDURE — 99232 SBSQ HOSP IP/OBS MODERATE 35: CPT | Performed by: NURSE PRACTITIONER

## 2019-12-20 PROCEDURE — 700105 HCHG RX REV CODE 258: Performed by: ANESTHESIOLOGY

## 2019-12-20 PROCEDURE — A6223 GAUZE >16<=48 NO W/SAL W/O B: HCPCS | Performed by: ORTHOPAEDIC SURGERY

## 2019-12-20 PROCEDURE — 160035 HCHG PACU - 1ST 60 MINS PHASE I: Performed by: ORTHOPAEDIC SURGERY

## 2019-12-20 PROCEDURE — 87070 CULTURE OTHR SPECIMN AEROBIC: CPT

## 2019-12-20 PROCEDURE — A9270 NON-COVERED ITEM OR SERVICE: HCPCS | Performed by: INTERNAL MEDICINE

## 2019-12-20 PROCEDURE — 87205 SMEAR GRAM STAIN: CPT

## 2019-12-20 PROCEDURE — 501838 HCHG SUTURE GENERAL: Performed by: ORTHOPAEDIC SURGERY

## 2019-12-20 PROCEDURE — 82962 GLUCOSE BLOOD TEST: CPT | Mod: 91

## 2019-12-20 PROCEDURE — 87075 CULTR BACTERIA EXCEPT BLOOD: CPT

## 2019-12-20 PROCEDURE — L1830 KO IMMOB CANVAS LONG PRE OTS: HCPCS | Performed by: ORTHOPAEDIC SURGERY

## 2019-12-20 PROCEDURE — 160002 HCHG RECOVERY MINUTES (STAT): Performed by: ORTHOPAEDIC SURGERY

## 2019-12-20 PROCEDURE — 501330 HCHG SET, CYSTO IRRIG TUBING: Performed by: ORTHOPAEDIC SURGERY

## 2019-12-20 PROCEDURE — 160028 HCHG SURGERY MINUTES - 1ST 30 MINS LEVEL 3: Performed by: ORTHOPAEDIC SURGERY

## 2019-12-20 PROCEDURE — 700102 HCHG RX REV CODE 250 W/ 637 OVERRIDE(OP): Performed by: INTERNAL MEDICINE

## 2019-12-20 PROCEDURE — 85027 COMPLETE CBC AUTOMATED: CPT

## 2019-12-20 PROCEDURE — 86850 RBC ANTIBODY SCREEN: CPT

## 2019-12-20 PROCEDURE — 99233 SBSQ HOSP IP/OBS HIGH 50: CPT | Performed by: INTERNAL MEDICINE

## 2019-12-20 PROCEDURE — 86900 BLOOD TYPING SEROLOGIC ABO: CPT

## 2019-12-20 PROCEDURE — 700111 HCHG RX REV CODE 636 W/ 250 OVERRIDE (IP): Performed by: INTERNAL MEDICINE

## 2019-12-20 PROCEDURE — 500440 HCHG DRESSING, STERILE ROLL (KERLIX): Performed by: ORTHOPAEDIC SURGERY

## 2019-12-20 PROCEDURE — 160009 HCHG ANES TIME/MIN: Performed by: ORTHOPAEDIC SURGERY

## 2019-12-20 PROCEDURE — 88311 DECALCIFY TISSUE: CPT

## 2019-12-20 PROCEDURE — 700101 HCHG RX REV CODE 250: Performed by: ORTHOPAEDIC SURGERY

## 2019-12-20 RX ORDER — ONDANSETRON 2 MG/ML
4 INJECTION INTRAMUSCULAR; INTRAVENOUS
Status: COMPLETED | OUTPATIENT
Start: 2019-12-20 | End: 2019-12-20

## 2019-12-20 RX ORDER — INSULIN GLARGINE 100 [IU]/ML
15 INJECTION, SOLUTION SUBCUTANEOUS EVERY EVENING
Status: DISCONTINUED | OUTPATIENT
Start: 2019-12-20 | End: 2019-12-22

## 2019-12-20 RX ORDER — BUPIVACAINE HYDROCHLORIDE 5 MG/ML
INJECTION, SOLUTION EPIDURAL; INTRACAUDAL
Status: DISCONTINUED | OUTPATIENT
Start: 2019-12-20 | End: 2019-12-20 | Stop reason: HOSPADM

## 2019-12-20 RX ORDER — SODIUM CHLORIDE, SODIUM LACTATE, POTASSIUM CHLORIDE, CALCIUM CHLORIDE 600; 310; 30; 20 MG/100ML; MG/100ML; MG/100ML; MG/100ML
INJECTION, SOLUTION INTRAVENOUS
Status: DISCONTINUED | OUTPATIENT
Start: 2019-12-20 | End: 2019-12-20 | Stop reason: SURG

## 2019-12-20 RX ORDER — LABETALOL HYDROCHLORIDE 5 MG/ML
5 INJECTION, SOLUTION INTRAVENOUS
Status: DISCONTINUED | OUTPATIENT
Start: 2019-12-20 | End: 2019-12-20 | Stop reason: HOSPADM

## 2019-12-20 RX ORDER — LORAZEPAM 2 MG/ML
0.5 INJECTION INTRAMUSCULAR
Status: DISCONTINUED | OUTPATIENT
Start: 2019-12-20 | End: 2019-12-20 | Stop reason: HOSPADM

## 2019-12-20 RX ORDER — HYDROMORPHONE HYDROCHLORIDE 1 MG/ML
0.2 INJECTION, SOLUTION INTRAMUSCULAR; INTRAVENOUS; SUBCUTANEOUS
Status: DISCONTINUED | OUTPATIENT
Start: 2019-12-20 | End: 2019-12-20 | Stop reason: HOSPADM

## 2019-12-20 RX ORDER — ETOMIDATE 2 MG/ML
INJECTION INTRAVENOUS PRN
Status: DISCONTINUED | OUTPATIENT
Start: 2019-12-20 | End: 2019-12-20 | Stop reason: SURG

## 2019-12-20 RX ORDER — MEPERIDINE HYDROCHLORIDE 25 MG/ML
25 INJECTION INTRAMUSCULAR; INTRAVENOUS; SUBCUTANEOUS
Status: DISCONTINUED | OUTPATIENT
Start: 2019-12-20 | End: 2019-12-20 | Stop reason: HOSPADM

## 2019-12-20 RX ORDER — DIPHENHYDRAMINE HYDROCHLORIDE 50 MG/ML
12.5 INJECTION INTRAMUSCULAR; INTRAVENOUS
Status: DISCONTINUED | OUTPATIENT
Start: 2019-12-20 | End: 2019-12-20 | Stop reason: HOSPADM

## 2019-12-20 RX ORDER — SODIUM CHLORIDE, SODIUM LACTATE, POTASSIUM CHLORIDE, CALCIUM CHLORIDE 600; 310; 30; 20 MG/100ML; MG/100ML; MG/100ML; MG/100ML
INJECTION, SOLUTION INTRAVENOUS CONTINUOUS
Status: DISCONTINUED | OUTPATIENT
Start: 2019-12-20 | End: 2019-12-20 | Stop reason: HOSPADM

## 2019-12-20 RX ORDER — HYDROMORPHONE HYDROCHLORIDE 1 MG/ML
0.4 INJECTION, SOLUTION INTRAMUSCULAR; INTRAVENOUS; SUBCUTANEOUS
Status: DISCONTINUED | OUTPATIENT
Start: 2019-12-20 | End: 2019-12-20 | Stop reason: HOSPADM

## 2019-12-20 RX ORDER — HALOPERIDOL 5 MG/ML
1 INJECTION INTRAMUSCULAR
Status: DISCONTINUED | OUTPATIENT
Start: 2019-12-20 | End: 2019-12-20 | Stop reason: HOSPADM

## 2019-12-20 RX ORDER — HYDROMORPHONE HYDROCHLORIDE 1 MG/ML
0.1 INJECTION, SOLUTION INTRAMUSCULAR; INTRAVENOUS; SUBCUTANEOUS
Status: DISCONTINUED | OUTPATIENT
Start: 2019-12-20 | End: 2019-12-20 | Stop reason: HOSPADM

## 2019-12-20 RX ORDER — PHENYLEPHRINE HCL IN 0.9% NACL 0.5 MG/5ML
SYRINGE (ML) INTRAVENOUS PRN
Status: DISCONTINUED | OUTPATIENT
Start: 2019-12-20 | End: 2019-12-20 | Stop reason: SURG

## 2019-12-20 RX ORDER — SODIUM CHLORIDE 9 MG/ML
INJECTION, SOLUTION INTRAVENOUS
Status: ACTIVE
Start: 2019-12-20 | End: 2019-12-21

## 2019-12-20 RX ORDER — OXYCODONE HCL 5 MG/5 ML
10 SOLUTION, ORAL ORAL
Status: COMPLETED | OUTPATIENT
Start: 2019-12-20 | End: 2019-12-20

## 2019-12-20 RX ORDER — HYDRALAZINE HYDROCHLORIDE 20 MG/ML
5 INJECTION INTRAMUSCULAR; INTRAVENOUS
Status: DISCONTINUED | OUTPATIENT
Start: 2019-12-20 | End: 2019-12-20 | Stop reason: HOSPADM

## 2019-12-20 RX ORDER — OXYCODONE HCL 5 MG/5 ML
5 SOLUTION, ORAL ORAL
Status: COMPLETED | OUTPATIENT
Start: 2019-12-20 | End: 2019-12-20

## 2019-12-20 RX ORDER — MIDAZOLAM HYDROCHLORIDE 1 MG/ML
INJECTION INTRAMUSCULAR; INTRAVENOUS PRN
Status: DISCONTINUED | OUTPATIENT
Start: 2019-12-20 | End: 2019-12-20 | Stop reason: SURG

## 2019-12-20 RX ADMIN — AMPICILLIN SODIUM AND SULBACTAM SODIUM 3 G: 2; 1 INJECTION, POWDER, FOR SOLUTION INTRAMUSCULAR; INTRAVENOUS at 23:57

## 2019-12-20 RX ADMIN — OXYCODONE HYDROCHLORIDE 5 MG: 5 SOLUTION ORAL at 17:00

## 2019-12-20 RX ADMIN — FENTANYL CITRATE 100 MCG: 50 INJECTION, SOLUTION INTRAMUSCULAR; INTRAVENOUS at 15:06

## 2019-12-20 RX ADMIN — SUGAMMADEX 200 MG: 100 INJECTION, SOLUTION INTRAVENOUS at 16:24

## 2019-12-20 RX ADMIN — FENTANYL CITRATE 100 MCG: 50 INJECTION, SOLUTION INTRAMUSCULAR; INTRAVENOUS at 14:39

## 2019-12-20 RX ADMIN — AMPICILLIN SODIUM AND SULBACTAM SODIUM 3 G: 2; 1 INJECTION, POWDER, FOR SOLUTION INTRAMUSCULAR; INTRAVENOUS at 12:00

## 2019-12-20 RX ADMIN — Medication 100 MCG: at 15:34

## 2019-12-20 RX ADMIN — CARVEDILOL 12.5 MG: 12.5 TABLET, FILM COATED ORAL at 06:10

## 2019-12-20 RX ADMIN — LIDOCAINE HYDROCHLORIDE 60 MG: 20 INJECTION, SOLUTION INTRAVENOUS at 14:39

## 2019-12-20 RX ADMIN — MIDAZOLAM 4 MG: 1 INJECTION INTRAMUSCULAR; INTRAVENOUS at 14:33

## 2019-12-20 RX ADMIN — AMPICILLIN SODIUM AND SULBACTAM SODIUM 3 G: 2; 1 INJECTION, POWDER, FOR SOLUTION INTRAMUSCULAR; INTRAVENOUS at 18:48

## 2019-12-20 RX ADMIN — ONDANSETRON 4 MG: 2 INJECTION INTRAMUSCULAR; INTRAVENOUS at 17:10

## 2019-12-20 RX ADMIN — SODIUM CHLORIDE, POTASSIUM CHLORIDE, SODIUM LACTATE AND CALCIUM CHLORIDE: 600; 310; 30; 20 INJECTION, SOLUTION INTRAVENOUS at 14:33

## 2019-12-20 RX ADMIN — Medication 100 MCG: at 14:49

## 2019-12-20 RX ADMIN — VALSARTAN 80 MG: 80 TABLET, FILM COATED ORAL at 06:10

## 2019-12-20 RX ADMIN — SPIRONOLACTONE 25 MG: 25 TABLET ORAL at 06:10

## 2019-12-20 RX ADMIN — Medication 100 MCG: at 15:21

## 2019-12-20 RX ADMIN — ROCURONIUM BROMIDE 50 MG: 10 INJECTION, SOLUTION INTRAVENOUS at 14:39

## 2019-12-20 RX ADMIN — ETOMIDATE 16 MG: 2 INJECTION INTRAVENOUS at 14:39

## 2019-12-20 RX ADMIN — LINEZOLID 600 MG: 600 TABLET, FILM COATED ORAL at 06:10

## 2019-12-20 RX ADMIN — MIDAZOLAM 4 MG: 1 INJECTION INTRAMUSCULAR; INTRAVENOUS at 15:06

## 2019-12-20 RX ADMIN — Medication 100 MCG: at 15:46

## 2019-12-20 RX ADMIN — ASPIRIN 81 MG: 81 TABLET, COATED ORAL at 06:10

## 2019-12-20 RX ADMIN — AMPICILLIN SODIUM AND SULBACTAM SODIUM 3 G: 2; 1 INJECTION, POWDER, FOR SOLUTION INTRAMUSCULAR; INTRAVENOUS at 06:11

## 2019-12-20 RX ADMIN — FENTANYL CITRATE 50 MCG: 50 INJECTION, SOLUTION INTRAMUSCULAR; INTRAVENOUS at 15:42

## 2019-12-20 ASSESSMENT — ENCOUNTER SYMPTOMS
STRIDOR: 0
COUGH: 0
CHEST TIGHTNESS: 0
WHEEZING: 0
SPUTUM PRODUCTION: 0
SHORTNESS OF BREATH: 0
CHILLS: 0
FEVER: 0
NEUROLOGICAL NEGATIVE: 1
GASTROINTESTINAL NEGATIVE: 1
APNEA: 0
EYES NEGATIVE: 1
SENSORY CHANGE: 1
SHORTNESS OF BREATH: 1
PND: 1
PSYCHIATRIC NEGATIVE: 1
CHOKING: 0
HEMOPTYSIS: 0

## 2019-12-20 ASSESSMENT — PAIN SCALES - GENERAL: PAIN_LEVEL: 3

## 2019-12-20 NOTE — PROGRESS NOTES
Cardiology Follow Up Progress Note    Date of Service  12/20/2019    Attending Physician  Donny Mcnally M.D.      Presents with toe gangrene and found to have osteomyelitis    Cardiology consultation for preop evaluation given abnormal EKG and elevated troponin peaked to 64    History of type 2 diabetes, hypertension, peripheral arterial disease status post angioplasty diabetic ulcer of left midfoot    Labs reviewed  Hemoglobin 9.2, hematocrit 28.2, platelet count 129, sodium 130, potassium 5.2    Blood pressure 100/57  Rhythm sinus    Interim Events  12/20/19 no overnight cardiac events, maintaining sinus rhythm, n.p.o., plan for left below the knee amputation this afternoon, denies angina, no dyspnea questions answered.    Review of Systems  Review of Systems   Respiratory: Negative for apnea, cough, choking, chest tightness, shortness of breath, wheezing and stridor.        Vital signs in last 24 hours  Temp:  [36.6 °C (97.8 °F)-36.7 °C (98.1 °F)] 36.6 °C (97.8 °F)  Pulse:  [89-94] 90  Resp:  [16-18] 16  BP: ()/(50-69) 100/57  SpO2:  [94 %-99 %] 97 %    Physical Exam  Physical Exam  Cardiovascular:      Pulses: Normal pulses.   Pulmonary:      Effort: Pulmonary effort is normal.   Abdominal:      General: Abdomen is flat.   Skin:     General: Skin is warm.   Neurological:      General: No focal deficit present.      Mental Status: He is alert.   Psychiatric:         Mood and Affect: Mood normal.         Lab Review  Lab Results   Component Value Date/Time    WBC 6.4 12/20/2019 03:00 AM    RBC 3.36 (L) 12/20/2019 03:00 AM    HEMOGLOBIN 9.2 (L) 12/20/2019 03:00 AM    HEMATOCRIT 28.3 (L) 12/20/2019 03:00 AM    MCV 84.2 12/20/2019 03:00 AM    MCH 27.4 12/20/2019 03:00 AM    MCHC 32.5 (L) 12/20/2019 03:00 AM    MPV 10.6 12/20/2019 03:00 AM      Lab Results   Component Value Date/Time    SODIUM 130 (L) 12/20/2019 03:00 AM    POTASSIUM 5.2 12/20/2019 03:00 AM    CHLORIDE 100 12/20/2019 03:00 AM    CO2 22  12/20/2019 03:00 AM    GLUCOSE 251 (H) 12/20/2019 03:00 AM    BUN 22 12/20/2019 03:00 AM    CREATININE 0.88 12/20/2019 03:00 AM    CREATININE 0.9 05/09/2007 01:20 AM      Lab Results   Component Value Date/Time    ASTSGOT 43 12/16/2019 03:32 AM    ALTSGPT 48 12/16/2019 03:32 AM     Lab Results   Component Value Date/Time    CHOLSTRLTOT 262 (H) 06/15/2018 10:20 PM     (H) 06/15/2018 10:20 PM    HDL 54 06/15/2018 10:20 PM    TRIGLYCERIDE 132 06/15/2018 10:20 PM    TROPONINT 44 (H) 12/16/2019 08:30 AM       No results for input(s): NTPROBNP in the last 72 hours.    Cardiac Imaging and Procedures Review  EKG:  SR , Twave inversion    Echocardiogram: No thrombus. Mildly reduced left ventricular systolic function.Left ventricular ejection fraction is visually estimated to be 45%.There is apical septum and apical akinesis.       Cardiac Catheterization:  12/16/19    1. Severe LV systolic dysfunction by noninvasive evaluation.    2. Severe and diffuse obstructive three-vessel coronary artery disease.    3. Normal LVEDP.    Imaging  Chest X-Ray: Hazy and reticular bilateral lung base opacities, greater on the right, appearance suggests atelectasis and/or right lung base infiltrate.  2.  Trace right pleural effusion      Assessment/Plan    New cardiomyopathy, EF 45%  -Status post left heart cath 12/16/19 revealed multivessel CAD  -Appreciate CTS consult. ? Plan for CABG  -Continue with aspirin 81, Lipitor 40, carvedilol 12.5, Plavix 75 mg, losartan 80 mg, spironolactone 25 mg.    Chronic left foot ulcerations complicated by diabetes, osteomyelitis, left second and third toe gangrene, failed limb salvage  -Plan for left below the knee amputation this afternoon  -Appreciate ID  -On Unasyn and Zyvox    Will follow     Please contact me with any questions.    CATHY Ndiaye.   Cardiologist, [facility]  [phone]

## 2019-12-20 NOTE — PROGRESS NOTES
Received report from day shift [facility]. Pt is sitting at edge of bed and does not appear to be in any distress. Call light and personal belongings within reach, bed in lowest locked position. POC addressed, white board updated. No further questions at this time.

## 2019-12-20 NOTE — CONSULTS
LIMB PRESERVATION SERVICE CONSULT    Referred by Dr. Ingram    DATE OF CONSULTATION: 12/19/2019    REASON FOR CONSULT: Left foot     HISTORY OF PRESENT ILLNESS: Israel Aviles is a 54 y.o.  with a past medical history that includes type 2 diabetes, PVD, chronic ulcerations with osteomyelitis, admitted 12/16/2019 for Osteomyelitis (HCC).   [facility] has been consulted for left dorsal foot ulcer x2, left second toe, left third toe, left fifth MTH ulcer.    Well-known to [facility].  See previous consult note for further detail.  Patient developed blisters in March 2019 to left foot after wearing leather shoes while visiting [facility].  Started wound care at [facility] in May 2019.  Once arterial studies were completed.  He was encouraged to go to the hospital for critical limb ischemia however he did not present until several days later.  Underwent angioplasty of left AT, PT by Dr. Chatterjee followed by left foot I&D with application of epi-fix by Dr. Chopra in June 2019.  He returned to the wound clinic for continuation of skin substitute and wound VAC therapy however ulcers continued to worsen as his lower extremity perfusion worsened.  It was recommended he have BKA but patient refused.  He was referred for hyperbaric oxygen therapy.  Patient went out of town.  When he returned in August he had worsening infection and cellulitis to his leg.  He was encouraged to go to the hospital and presented the next day.  Again it was recommended he undergo BKA but he refused.  He discharged with home health.  Went to [place] in October 2019 for foot infection.  BKA was recommended, but he refused.  Patient went to [facility] for the next 2 months and became seriously ill beginning of December 2019 where he was in the hospital for 2 weeks.  He discharged from the hospital in [facility], immediately returned to the [place] and proceeded to the ED for further care.  Patient was found to have poor ejection fraction of 35% and obstructive multivessel  CAD.    IV antibiotics were started on this admission.  Infectious diseases has been consulted.    Xray completed and is positive for osteomyelitis.  Ortho involved    Diagnosed with diabetes 15 years ago, and is currently managing with oral therapy.  Checks blood sugars daily, average 110. Has had previous diabetes education.  Does not have numbness to feet.  Checks feet routinely.     Smoking:   reports that he has never smoked. He has never used smokeless tobacco.    Alcohol:   reports no history of alcohol use.    Drug:   reports no history of drug use.      PAST MEDICAL HISTORY:   Past Medical History:   Diagnosis Date   • Diabetes (HCC)         PAST SURGICAL HISTORY:   Past Surgical History:   Procedure Laterality Date   • IRRIGATION & DEBRIDEMENT ORTHO Left 6/24/2019    Procedure: IRRIGATION AND DEBRIDEMENT, WOUND-FOOT, BIOLOGIC PLACEMENT, WOUND VAC PLACEMENT;  Surgeon: Charles Chopra M.D.;  Location: SURGERY [facility];  Service: Orthopedics       MEDICATIONS:   Current Facility-Administered Medications   Medication Dose   • ampicillin/sulbactam (UNASYN) 3 g in  mL IVPB  3 g   • insulin glargine (LANTUS) injection 8 Units  8 Units    And   • insulin lispro (HUMALOG) injection 5 Units  0.2 Units/kg/day    And   • insulin lispro (HUMALOG) injection 1-6 Units  1-6 Units    And   • glucose 4 g chewable tablet 16 g  16 g    And   • DEXTROSE 10% BOLUS 250 mL  250 mL   • carvedilol (COREG) tablet 12.5 mg  12.5 mg   • spironolactone (ALDACTONE) tablet 25 mg  25 mg   • valsartan (DIOVAN) tablet 80 mg  80 mg   • senna-docusate (PERICOLACE or SENOKOT S) 8.6-50 MG per tablet 2 Tab  2 Tab    And   • polyethylene glycol/lytes (MIRALAX) PACKET 1 Packet  1 Packet    And   • magnesium hydroxide (MILK OF MAGNESIA) suspension 30 mL  30 mL    And   • bisacodyl (DULCOLAX) suppository 10 mg  10 mg   • Respiratory Care per Protocol     • enalaprilat (VASOTEC) injection 1.25 mg  1.25 mg   • cloNIDine (CATAPRES) tablet  0.1 mg  0.1 mg   • atorvastatin (LIPITOR) tablet 40 mg  40 mg   • clopidogrel (PLAVIX) tablet 75 mg  75 mg   • aspirin EC (ECOTRIN) tablet 81 mg  81 mg   • linezolid (ZYVOX) tablet 600 mg  600 mg       ALLERGIES:  No Known Allergies     FAMILY HISTORY: History reviewed. No pertinent family history.      REVIEW OF SYSTEMS:   Constitutional: Negative for chills, fever   Respiratory: Negative for cough and shortness of breath.    Cardiovascular:Negative for chest pain, and claudication.   Gastrointestinal: Negative for constipation, diarrhea, nausea and vomiting.   Lower extremities: positive for swelling and redness  Neurological: Negative for numbness to feet and lower legs  All other systems reviewed and are negative     RESULTS:     Recent Labs     12/17/19  0033 12/18/19  0230 12/18/19  1135 12/19/19  0203   WBC 7.3 6.6  --  6.9   RBC 4.16* 3.69*  --  3.30*   HEMOGLOBIN 11.3* 9.9* 9.8* 8.9*   HEMATOCRIT 35.3* 31.2*  --  28.1*   MCV 84.9 84.6  --  85.2   MCH 27.2 26.8*  --  27.0   MCHC 32.0* 31.7*  --  31.7*   RDW 50.3* 50.4*  --  50.5*   PLATELETCT 122* 129*  --  128*   MPV 10.4 10.0  --  10.7     Recent Labs     12/17/19  0033 12/18/19  0230 12/19/19  0203   SODIUM 133* 132* 129*   POTASSIUM 4.1 4.4 4.4   CHLORIDE 97 100 99   CO2 26 25 22   GLUCOSE 258* 87 183*   BUN 17 16 19         ESR:     Results from last 7 days   Lab Units 12/16/19  0450   SED RATE WESTERGREN 1526 mm/hour 75*       CRP:       Results from last 7 days   Lab Units 12/16/19  0332   C REACTIVE PROTEIN 4596 mg/dL 6.10*       X-ray: Left foot 12/16/2019:  1.  Fracture at the neck of the third toe proximal phalanx  2.  Fracture through the base of the second toe proximal phalanx with new osteolysis, appears likely related to pathologic fracture from suspected osteomyelitis.  3.  Osteolytic at the first metatarsophalangeal joint, increased since prior study, appearance favors progressive osteomyelitis.  4.  New osteolytic changes at the first  "tarsometatarsal joint, appearance most compatible with osteomyelitis.  5.  New areas of osteolysis involving the second, third, and fifth metatarsal heads, concerning for osteomyelitis  6.  Atherosclerosis      MRI: None this admission    Arterial studies: None this admission    A1c:  Lab Results   Component Value Date/Time    HBA1C 8.5 (H) 12/17/2019 12:33 AM            Microbiology:  Results     Procedure Component Value Units Date/Time    BLOOD CULTURE [[id number]] Collected:  12/16/19 0432    Order Status:  Completed Specimen:  Blood from Peripheral Updated:  12/17/19 0835     Significant Indicator NEG     Source BLD     Site PERIPHERAL     Culture Result No Growth  Note: Blood cultures are incubated for 5 days and  are monitored continuously.Positive blood cultures  are called to the RN and reported as soon as  they are identified.      Narrative:       Per Hospital Policy: Only change Specimen Src: to \"Line\" if  specified by physician order.  Right Hand    BLOOD CULTURE [[id number]] Collected:  12/16/19 0450    Order Status:  Completed Specimen:  Blood from Peripheral Updated:  12/17/19 0835     Significant Indicator NEG     Source BLD     Site PERIPHERAL     Culture Result No Growth  Note: Blood cultures are incubated for 5 days and  are monitored continuously.Positive blood cultures  are called to the RN and reported as soon as  they are identified.      Narrative:       Per Hospital Policy: Only change Specimen Src: to \"Line\" if  specified by physician order.  No site indicated    URINALYSIS CULTURE, IF INDICATED [[id number]]  (Abnormal) Collected:  12/16/19 0541    Order Status:  Completed Specimen:  Urine Updated:  12/16/19 0555     Color Yellow     Character Clear     Specific Gravity 1.019     Ph 7.0     Glucose >=1000 mg/dL      Ketones Negative mg/dL      Protein 30 mg/dL      Bilirubin Negative     Urobilinogen, Urine 0.2     Nitrite Negative     Leukocyte Esterase Negative     Occult Blood Negative     " Micro Urine Req Microscopic           PHYSICAL EXAMINATION:     VITAL SIGNS: BP (!) 93/56 Comment: RN notified  Pulse 92   Temp 36.6 °C (97.8 °F) (Temporal)   Resp 18   Ht 1.829 m (6')   Wt 77.6 kg (171 lb 1.2 oz)   SpO2 97%   BMI 23.20 kg/m²       General Appearance:  Well developed, well nourished, in no acute distress  Fatigue  Lower Extremity Assessment:    Edema:   +3 to LLE    Pulses:  R foot: Unable to palpate PT, DP  L foot: Faintly palpable DP.  Unable to palpate PT.    ROM dorsi/plantarflexion  Intact      Sensory Assessment  Sensation intact to feet      Wound Assessment:    Left dorsal medial foot ulcer:  Wound is decreased in size with skin bridge  the ulcer into two.  Bone easily palpated with slough, semi-purulent drainage to dorsal first MTH ulcer  Bone easily palpated with slough semi-purulent drainage to dorsal hindfoot medial aspect  Proximal aspect measures approximately: 4 x 4 centimeters  Distal aspect measures approximately 4 x 2 cm    Left second toe:  100% dry necrosis, mummified to proximal, middle, distal phalanges    Left third toe:  100% dry necrosis, mummified to proximal, middle, distal phalanges    Left lateral fifth MTH ulcer:  100% dried brown eschar  Measures approximately 2 x 2 cm    Dry dressing replaced to left dorsal foot ulcers.  Left second and third toe ulcers and left fifth MTH ulcers remain open to air      Education:   - Implications of loss of protective sensation (LOPS) discussed with patient- including increased risk for amputation.  Advised to check feet at least daily, moisturize feet, and to always wear protective foot wear.   -avoid trimming own nails. See podiatrist or certified foot and nail RN  -keep blood sugars <150 for improved wound healing          ASSESSMENT AND PLAN:   Chronic left foot ulcerations complicated by diabetes, PVD, osteomyelitis; left second and third toe gangrene.  Failed multiple modalities for limb salvage.  Patient now  agreeable to BKA.  -Plan for CABG x3 once patient has had BKA per cardiology.    Wound care:   Dry gauze dressing to left dorsal foot ulcers  Left second and third toe: Open air  Left fifth MTH ulcer: Open to air      Imaging/Labs:   No further imaging or labs at this time    Vascular status: +2 popliteal to LLE, unable to palpate PT, faint palpable DP    Surgery: N.p.o. at midnight for left BKA with Dr. Zhao tomorrow    Antibiotics: Continue IV antibiotics.  Infectious disease involved.    Weight Bearing Status: WBAT to LLE.  Patient to be nonweightbearing postop.      PT/OT Consult: Involved.  Consult postop    Diabetes Education: A1c this admission 8.5%.  Involved    Discharge Plan:  Recommend rehab versus SNF    D/W: Patient, son, RN, Dr. Sepulveda, Dr. Zhao, Dr. Morel    Please note that this dictation was created using voice recognition software. I have  worked with technical experts from [facility] to optimize the interface.  I have made every reasonable attempt to correct obvious errors, but there may be errors of grammar and possibly content that I did not discover before finalizing the note.

## 2019-12-20 NOTE — PROGRESS NOTES
Bedside report received from night shift nurse. POC discussed with pt; all questions answered at this time. Bed in lowest position with wheels locked and call light in reach.

## 2019-12-20 NOTE — PROGRESS NOTES
Infectious Disease Progress Note    Author: Angie Gonzalez M.D. Date & Time of service: 2019  1:11 PM    Chief Complaint:  Left lower extremity gangrene, LLE ulceration, and cellulitis.       Interval History:  55 y/o diabetic male admitted with dyspnea. Found to have ischemic CM and above   AF WBC 7.3 planned for bypass-ortho surgery on hold. No current SOB or CP. Pain in LLE controlled. States while in [place] had fever, cough, SOB. Afebrile for one week prior to return home. Declined treatment for LLE while in [place].   AF WBC 6.6 no fever, chills-decreased swelling and erythema left foot-toes remain dry and gangrenous. +episodes CP and SOB   AF WBC 6.9 up to chair-tentative OR tomorrow-no new complaints   AF up to chair-plan for BKA today-no new complaints tolerating abx. Plan of care discussed  Labs Reviewed, Medications Reviewed,and Wound Reviewed.    Review of Systems:  Review of Systems   Constitutional: Negative for chills and fever.   Respiratory: Negative for cough, hemoptysis, sputum production and shortness of breath.    Cardiovascular: Positive for leg swelling. Negative for chest pain.   Skin: Negative for itching and rash.   Neurological: Positive for sensory change.   All other systems reviewed and are negative.      Hemodynamics:  Temp (24hrs), Av.5 °C (97.7 °F), Min:36.2 °C (97.1 °F), Max:36.7 °C (98.1 °F)  Temperature: 36.2 °C (97.1 °F)  Pulse  Av.1  Min: 85  Max: 107   Blood Pressure: (!) 94/58       Physical Exam:  Physical Exam  Vitals signs and nursing note reviewed.   Constitutional:       General: He is not in acute distress.     Appearance: He is not ill-appearing or diaphoretic.   HENT:      Nose: No rhinorrhea.      Mouth/Throat:      Mouth: Mucous membranes are moist.      Pharynx: No posterior oropharyngeal erythema.   Eyes:      General: No scleral icterus.        Right eye: No discharge.         Left eye: No discharge.   Neck:       Musculoskeletal: Neck supple.   Cardiovascular:      Rate and Rhythm: Tachycardia present.   Pulmonary:      Effort: Pulmonary effort is normal. No respiratory distress.      Breath sounds: No stridor. Rhonchi present. No wheezing or rales.   Abdominal:      General: Bowel sounds are normal. There is no distension.      Palpations: Abdomen is soft.      Tenderness: There is no tenderness. There is no guarding or rebound.   Musculoskeletal:      Right lower leg: No edema.      Left lower leg: Edema present.   Lymphadenopathy:      Cervical: No cervical adenopathy.   Skin:     Coloration: Skin is not jaundiced.      Comments: Large deep ulceration dorsum left foot-  Dry gangrene 2nd and 3rd toes, left  Decreased erythema and swelling   Neurological:      General: No focal deficit present.   Psychiatric:         Mood and Affect: Mood normal.         Meds:    Current Facility-Administered Medications:   •  [MAR Hold] ampicillin-sulbactam (UNASYN) IV  •  [MAR Hold] insulin glargine **AND** [MAR Hold] insulin lispro **AND** [MAR Hold] insulin lispro **AND** Accu-Chek ACHS **AND** NOTIFY MD and PharmD **AND** [MAR Hold] glucose **AND** [MAR Hold] dextrose 10% bolus  •  [MAR Hold] carvedilol  •  [MAR Hold] spironolactone  •  [MAR Hold] valsartan  •  [MAR Hold] senna-docusate **AND** [MAR Hold] polyethylene glycol/lytes **AND** [MAR Hold] magnesium hydroxide **AND** [MAR Hold] bisacodyl  •  [MAR Hold] Respiratory Care per Protocol  •  [MAR Hold] enalaprilat  •  [MAR Hold] cloNIDine  •  [MAR Hold] atorvastatin  •  [MAR Hold] clopidogrel  •  [MAR Hold] aspirin  •  [MAR Hold] linezolid    Labs:  Recent Labs     12/18/19  0230 12/18/19  1135 12/19/19  0203 12/20/19  0300   WBC 6.6  --  6.9 6.4   RBC 3.69*  --  3.30* 3.36*   HEMOGLOBIN 9.9* 9.8* 8.9* 9.2*   HEMATOCRIT 31.2*  --  28.1* 28.3*   MCV 84.6  --  85.2 84.2   MCH 26.8*  --  27.0 27.4   RDW 50.4*  --  50.5* 50.6*   PLATELETCT 129*  --  128* 129*   MPV 10.0  --  10.7 10.6    NEUTSPOLYS 81.70*  --  87.00* 77.90*   LYMPHOCYTES 13.90*  --  7.80* 15.00*   MONOCYTES 4.40  --  5.20 6.20   EOSINOPHILS 0.00  --  0.00 0.90   BASOPHILS 0.00  --  0.00 0.00   RBCMORPHOLO Normal  --  Normal Present     Recent Labs     12/18/19  0230 12/19/19  0203 12/20/19  0300   SODIUM 132* 129* 130*   POTASSIUM 4.4 4.4 5.2   CHLORIDE 100 99 100   CO2 25 22 22   GLUCOSE 87 183* 251*   BUN 16 19 22     Recent Labs     12/18/19  0230 12/19/19  0203 12/20/19  0300   CREATININE 0.66 0.73 0.88       Imaging:  Dx-chest-portable (1 View)    Result Date: 12/16/2019 12/16/2019 3:31 AM HISTORY/REASON FOR EXAM: Shortness of Breath TECHNIQUE/EXAM DESCRIPTION:  Single AP view of the chest. COMPARISON: None FINDINGS: Overlying cardiac leads are present. The cardiac silhouette appears within normal limits. The mediastinal contour appears within normal limits.  The central pulmonary vasculature appears normal. The lungs appear well expanded bilaterally.  Hazy and reticular bilateral lung base opacities are seen, greater on the right. Mild blunting of the right costophrenic angle is seen suggesting small right effusion. The bony structures appear age-appropriate.     1.  Hazy and reticular bilateral lung base opacities, greater on the right, appearance suggests atelectasis and/or right lung base infiltrate. 2.  Trace right pleural effusion    Dx-foot-complete 3+ Left    Result Date: 12/16/2019 12/16/2019 4:07 AM HISTORY/REASON FOR EXAM: Atraumatic Pain/Swelling/Deformity; Gangrenous second and third toe, nonhealing ulcer to the medial surface of the foot.  Suspect osteomyelitis TECHNIQUE/EXAM DESCRIPTION:  AP, lateral, and oblique views of the LEFT foot. COMPARISON:  August 21, 2019 FINDINGS: There is third toe proximal phalanx neck fracture identified. There is fracture through the base of the second toe proximal phalanx. There is mild ostial lysis at the base of the second toe which is new since prior study. Osteolytic  changes at the first metatarsophalangeal joint are seen, slightly more pronounced since prior study. New areas of osteolysis are seen involving the second, third, and fifth metatarsal heads. There are new lytic changes identified at the first tarsometatarsal joint. Atherosclerotic changes are noted. Soft tissue swelling of the forefoot is noted.     1.  Fracture at the neck of the third toe proximal phalanx 2.  Fracture through the base of the second toe proximal phalanx with new osteolysis, appears likely related to pathologic fracture from suspected osteomyelitis. 3.  Osteolytic at the first metatarsophalangeal joint, increased since prior study, appearance favors progressive osteomyelitis. 4.  New osteolytic changes at the first tarsometatarsal joint, appearance most compatible with osteomyelitis. 5.  New areas of osteolysis involving the second, third, and fifth metatarsal heads, concerning for osteomyelitis 6.  Atherosclerosis    Us-extremity Venous Lower Bilat    Result Date: 2019   Vascular Laboratory  CONCLUSIONS  Normal bilateral superficial and deep venous examination of the lower  extremities.  Prominent left inguinal lymph nodes.  TRELL CASTLE  Exam Date:     2019 07:02  Room #:     Inpatient  Priority:     Routine  Ht (in):             Wt (lb):  Ordering Physician:        REBECCA REARDON  Referring Physician:       [id number]  Sonographer:               Bonnie Bryant RVT  Study Type:                Complete Bilateral  Technical Quality:         Adequate  Age:    54    Gender:     M  MRN:    [id number]  :    1965      BSA:  Indications:     Localized swelling, mass and lump, unspecified lower limb  CPT Codes:       38265  ICD Codes:       R22.40  History:         Bilateral swelling  Limitations:  PROCEDURES:  Bilateral lower extremity venous duplex imaging.  The following venous structures were evaluated: common femoral, profunda  femoral,  greater saphenous, femoral, popliteal , peroneal and posterior  tibial veins.  Serial compression, color and spectral Doppler flow evaluations were  performed.  FINDINGS:  Bilateral lower extremities -.  No deep venous thrombosis.  Complete color filling and compressibility with normal venous flow dynamics  including spontaneous flow, response to augmentation maneuvers, and  respiratory phasicity.  There are two enlarged lyph nodes seen in the left groin. The largest  measuring 2.0x0.92cm  Reinier Wray  (Electronically Signed)  Final Date:      16 December 2019                   08:34    Ct-cta Chest Pulmonary Artery W/ Recons    Result Date: 12/16/2019 12/16/2019 9:34 AM HISTORY/REASON FOR EXAM:  Shortness of breath and tachycardia TECHNIQUE/EXAM DESCRIPTION: CT angiogram scan for pulmonary embolism with contrast, with reconstructions. 1.25 mm helical sections were obtained from the lung apices through the lung bases following the rapid bolus administration of 55 mL of Omnipaque 350 nonionic contrast. Thin-section overlapping reconstruction interval was utilized. Coronal reconstructions were generated from the axial data. MIP post processing was performed and utilized for the interpretation. Low dose optimization technique was utilized for this CT exam including automated exposure control and adjustment of the mA and/or kV according to patient size. COMPARISON: None FINDINGS: There are patchy bilateral infiltrates. There are small bilateral pleural effusions. There are no pulmonary nodules seen. There is no evidence of mediastinal or hilar adenopathy. There is atherosclerotic vascular calcification. No large masses are seen within the upper abdomen. There is no evidence of filling defect within the pulmonary arterial system to suggest presence of pulmonary embolus.     1.  No evidence of pulmonary embolus. 2.  Patchy bilateral infiltrates and small bilateral pleural effusions. 3.  Atherosclerotic vascular  calcification.    Ec-echocardiogram Complete W/ Cont    Result Date: 12/16/2019  Transthoracic Echo Report Echocardiography Laboratory CONCLUSIONS Severely reduced left ventricular systolic function. Left ventricular ejection fraction is visually estimated to be 30-35%. There appears to be a linear echodensity which is adjacent to the LV apex concerning for possible small LV thrombus. May consider repeat limted echo in 1-2 days for re-evaluation if clinically indicated.  Normal inferior vena cava size and inspiratory collapse. Indeterminate diastolic function. Aortic sclerosis without stenosis. Dr. Segovia is aware of the above findings. TRELL CASTLE Exam Date:          LV EF:        % FINDINGS Left Ventricle Normal left ventricular chamber size. Normal left ventricular wall thickness. Severely reduced left ventricular systolic function. Left ventricular ejection fraction is visually estimated to be 30-35%. Global hypokinesis with apical dyskinesis. Basal septum appears to have normal contractility.  Indeterminate diastolic function.  Contrast was used to evaluate for thrombus in the left ventricular apex. There appears to be a linear echodensity which is adjacent to the LV apex. LV thrombus cannot be ruled out. Consider repeat limted echo in 1-2 days for re-evaluation if clinically indicated.  Spontaneous contrast is noted. 3 ML of contrast was administered. Existing IV was used. Right Ventricle Normal right ventricular size and systolic function. Right Atrium Normal right atrial size. Normal inferior vena cava size and inspiratory collapse. Left Atrium Mildly dilated left atrium. Left atrial volume index is 35 mL/sq m. Mitral Valve Mitral annular calcification. No mitral stenosis. Mild mitral regurgitation. Aortic Valve Tricuspid aortic valve. Aortic sclerosis without stenosis. No aortic insufficiency. Tricuspid Valve Structurally normal tricuspid valve. No tricuspid stenosis. Trace tricuspid regurgitation.  Unable to estimate pulmonary artery pressure due to an inadequate tricuspid regurgitant jet. Pulmonic Valve The pulmonic valve is not well visualized.  No pulmonic stenosis. Trace pulmonic insufficiency. Pericardium No pericardial effusion seen. Aorta Normal aortic root for body surface area. Ascending aorta diameter is 3.3 cm. Caitlin Acosta MD (Electronically Signed) Final Date:     16 December 2019                 12:09    Cl-left Heart Catheterization With Possible Intervention  Result Date: 12/16/2019  Conclusions   1. Severe LV systolic dysfunction by noninvasive evaluation.   2. Severe and diffuse obstructive three-vessel coronary artery disease.   3. Normal LVEDP. Post-Procedure Diagnosis   Ischemic cardiomyopathy. Recommendations   * Medical therapy of coronary artery disease and ischemic cardiomyopathy. Consider nonurgent and staged revascularization, preferably bypass surgery.   * Would be reasonable to proceed with planned amputation of the foot prior to coronary artery revascularization.  If this path is elected it would be at increased risk of perioperative cardiovascular complications and I would recommend perioperative antiplatelet medication statin therapy beta-blockade and close attention to avoid hemodynamic stressors for excess blood loss/fluid shifts.   Never Coronary Diagnostic Findings   * Left main: Distal tapering with 50% stenosis.   * Left anterior descending: Diffuse atherosclerosis and negative remodeling beginning at the origin.  There are sequential 70% stenosis in the proximal and mid segment of the vessel.  There is subtotal occlusion in the mid to distal segment with AGATHA II flow into the apical LAD. The first diagonal has proximal 70% stenosis and is diffusely diseased and negatively remodeled with 95% stenosis in the lower branch.   * Left circumflex: Mid vessel 70-80% stenosis.  The OM1 has diffuse atherosclerosis there is less than 1.5 mm in size and without significant narrowing.   The OM 2 bifurcates proximally the upper branch has proximal 50% stenosis and is a less than 1.5 mm vessel.  The lower branch is a 2.25 mm vessel and has proximal 85% stenosis.   * Right coronary artery: Dominant, diffuse 60% stenosis in the proximal mid and distal AV groove.  There is a dual PDA.  The first PDA has a mid 80% stenosis and diffuse disease downstream.  The second PDA has proximal 80% stenosis. Cath Hemodynamic Data Pressures     Phase:Baseline     AO (sys/bauman/mean) :  99 mmHg / 61 mmHg ( 73 mmHg )  @ 2:02:13 PM                           90 mmHg / 57 mmHg ( 69 mmHg )  @ 2:06:18 PM                           92 mmHg / 56 mmHg ( 73 mmHg )  @ 2:06:41 PM     LV (sys/bauman/EDP) :  91 mmHg / 8 mmHg / 12 mmHg  @ 2:06:10 PM                          89 mmHg / 8 mmHg / 12 mmHg  @ 2:06:13 PM     AO (sys/bauman/mean) HR:  91 bpm  @ 2:02:13 PM                             96 bpm  @ 2:06:18 PM                             96 bpm  @ 2:06:41 PM     LV (sys/bauman/EDP) HR:  96 bpm  @ 2:06:10 PM                            95 bpm  @ 2:06:13 PM     LV (sys/bauman/EDP) DP/DT:  816 mmHg/s  @ 2:06:10 PM                               816 mmHg/s  @ 2:06:13 PM Access Closure   * Sheath Pulled and Post Procedure Forms (Pulses, etc.) Closure Device Deployed. Procedural Details   Correct Patient established (2 Patient Identifiers).   Correct Procedure verified with Patient.   Informed Consent Signed By MD And On Chart.   The risks and benefits of cardiac catheterization as well as the procedure itself, rationale and appropriateness were discussed with the patient. Complications including but not limited to death, stroke, MI, urgent bypass surgery, contrast nephropathy, vascular complications, bleeding and infection were explained to the patient.   Pre-procedure Teaching Performed.   Patient Verbalized Understanding of Procedure.   Updated and Current H & P On The Chart.   The insertion site was disinfected with a Chlorhexidine-based  preparation. The antiseptic remained on the insertion site and was allowed to air dry for at least two (2) minutes. The patient was covered with a large sterile drape, with a small opening corresponding to the insertion site. The sterile drape covered the patient from head to toe.   Pre-Anesthesia Assessment & Sedation Plan Completed.   Percutaneous Puncture to right radial artery.   6FR 6FR Slender SWS Glidesheath Sheath Inserted Into right radial artery.   5fr/100cm TIG 4.0 100cm Catheter Inserted by Guidewire.   150cm .035 J Guidewire used.   Left Coronary System Injected & Multi. Views Taken.   Right Coronary System Injected & Multi. Views Taken.   Pullback LV to AO.   Catheter Removed.   No Complications.   Sheath Pulled and Post Procedure Forms (Pulses, etc.) Closure Device Deployed.   Patent Hemostasis Confirmed/Reverse Barbeau Test Done by: MAKAYLA.   Post Procedure Teaching Performed.   Patient Verbalizes Understanding of Teaching. Access Site   * Percutaneous Puncture to right radial artery.   * 6FR 6FR Slender SWS Glidesheath Sheath Inserted Into right radial artery. Sedation Start Time:     13:52 Sedation Stop Time:     14:07 Sedation Total Time:     16 min Case Start Time:     13:59 Procedure(s) Performed   * Hemostasis with Radial Compression Device.   * Art Access - R radial artery.   * Coronary Angio.   * Left Heart Cath. Procedure Medications ------------------------------ Start:     1:52 PM Medication:     Versed Amount:     1 mg Route:     IV Administered By:     Gris Rodriguez Ordered By:     Rosita Liao ------------------------------ Start:     1:52 PM Medication:     Fentanyl Amount:     50 mcg Route:     IV Administered By:     Gris Rodriguez Ordered By:     Rosita Liao ------------------------------ Start:     2:00 PM Medication:     2% Lidocaine Amount:     2 ml Route:     Subcut Administered By:     Rosita Liao Ordered By:     Rosita Liao ------------------------------ Start:     2:01  "PM Medication:     Heparin Amount:     5000 units Route:     IA Administered By:     Rosita Liao Ordered By:     Rosita Liao ------------------------------ Start:     2:01 PM Medication:     Nitroglycerin Amount:     100 mcg Route:     IA Administered By:     Rosita Liao Ordered By:     Rosita Liao ------------------------------ Start:     2:01 PM Medication:     Verapamil Amount:     2.5 mg Route:     IA Administered By:     Rosita Liao Ordered By:     Rosita Liao ------------------------------ Start:     2:03 PM Medication:     Nitroglycerin Amount:     240 mcg Route:     IC Administered By:     Rosita Liao Ordered By:     Rosita Liao X-ray Summary ------------------------------ Total Time (min):     1.40 Total Dose (mGy):     293 DAP (cGy*cm):     --- Contrast ------------------------------ Contrast:     Omnipaque 350 Amount (mL):     25 ml Report Signatures Finalized by Keshawn Becker MD on 12/16/2019 02:27 PM      Micro:  Results     Procedure Component Value Units Date/Time    BLOOD CULTURE [[id number]] Collected:  12/16/19 0432    Order Status:  Completed Specimen:  Blood from Peripheral Updated:  12/17/19 0835     Significant Indicator NEG     Source BLD     Site PERIPHERAL     Culture Result No Growth  Note: Blood cultures are incubated for 5 days and  are monitored continuously.Positive blood cultures  are called to the RN and reported as soon as  they are identified.      Narrative:       Per Hospital Policy: Only change Specimen Src: to \"Line\" if  specified by physician order.  Right Hand    BLOOD CULTURE [[id number]] Collected:  12/16/19 0450    Order Status:  Completed Specimen:  Blood from Peripheral Updated:  12/17/19 0835     Significant Indicator NEG     Source BLD     Site PERIPHERAL     Culture Result No Growth  Note: Blood cultures are incubated for 5 days and  are monitored continuously.Positive blood cultures  are called to the RN and reported as soon as  they are " "identified.      Narrative:       Per Hospital Policy: Only change Specimen Src: to \"Line\" if  specified by physician order.  No site indicated    URINALYSIS CULTURE, IF INDICATED [[id number]]  (Abnormal) Collected:  12/16/19 0541    Order Status:  Completed Specimen:  Urine Updated:  12/16/19 0555     Color Yellow     Character Clear     Specific Gravity 1.019     Ph 7.0     Glucose >=1000 mg/dL      Ketones Negative mg/dL      Protein 30 mg/dL      Bilirubin Negative     Urobilinogen, Urine 0.2     Nitrite Negative     Leukocyte Esterase Negative     Occult Blood Negative     Micro Urine Req Microscopic          Assessment:  Left lower extremity gangrene, LLE ulceration, and cellulitis  OM left foot, multifocal.   Ischemic cardiomyopathy  Diabetes  Carbapenemase +in stool  PVD    Plan:  Left lower extremity gangrene, dry  LLE dorsal foot ulceration  Cellulitis, improved  Afebrile  No current leukcytosis  Blood cxs neg  DW Ortho: Had been seen at [facility] and declined amp toes/ BKA prior to trip to [facility]  Per patient while in [facility] did not seek any treatment for LLE-told doctors there just to dress it  Imaging +multifocal OM  Continue Zyvox and Unasyn for now  Anticipate DC abx 24-48 hours post-op as surgery will be definitive treatment for his foot infections    CAD  S/p cardiac cath  Plan for CABG  Ischemic CM with EF 15%  CXR pulm edema by my read    Diabetes.   Keep BS under 150 to help control current infection  -281    PVD  WIll impair abx efficacy and wound care  Vascular eval appreciated-no intervention planned    Stool PCR +carbapenemase  Contact isolation  Escalate abx if worsening infection    I have performed a physical exam and reviewed and updated ROS as of today.  In review of yesterday's note dated 12/19/2019 , there are no changes except as documented above.                  "

## 2019-12-20 NOTE — PROGRESS NOTES
Pt. Off the floor for BKA. Pt. Went with transport and Rabia RN with Baon on IV pole being administered.  Called monitor room and informed them patient was off the monitor and the floor.

## 2019-12-20 NOTE — CARE PLAN
Problem: Infection  Goal: Will remain free from infection  Outcome: NOT MET  Interventions in place

## 2019-12-20 NOTE — ANESTHESIA PROCEDURE NOTES
Airway  Date/Time: 12/20/2019 2:40 PM  Performed by: Juan Miguel Dsouza M.D.  Authorized by: Juan Miguel Dsouza M.D.     Location:  OR  Urgency:  Elective  Indications for Airway Management:  Anesthesia  Spontaneous Ventilation: absent    Sedation Level:  Deep  Preoxygenated: Yes    Patient Position:  Sniffing  Final Airway Type:  Endotracheal airway  Final Endotracheal Airway:  ETT  Cuffed: Yes    Technique Used for Successful ETT Placement:  Direct laryngoscopy  Insertion Site:  Oral  Blade Type:  Matt  Laryngoscope Blade/Videolaryngoscope Blade Size:  3  ETT Size (mm):  7.5  Measured from:  Teeth  ETT to Teeth (cm):  23  Placement Verified by: auscultation and capnometry    Cormack-Lehane Classification:  Grade IIb - view of arytenoids or posterior of glottis only  Number of Attempts at Approach:  1

## 2019-12-20 NOTE — PROGRESS NOTES
Left foot OM and wounds, diabetes, multiresistant organisms, high cardiac risk from severe multivessel coronary  artery disease     PLAN  Left BKA  Risks and benefits of surgery discussed

## 2019-12-20 NOTE — CARE PLAN
Problem: Safety  Goal: Will remain free from injury  Note:   Fall precautions in place. Bed in lowest position. Non-skid socks in place. Personal possessions within reach. Mobility sign on door. Bed-alarm on. Call light within reach. Pt educated regarding fall prevention and states understanding.       Problem: Infection  Goal: Will remain free from infection  Outcome: PROGRESSING AS EXPECTED  Note:   Pt educated on importance of hand hygiene to reduce the risk of infection. Standard isolation precautions implemented by staff.

## 2019-12-20 NOTE — ANESTHESIA PREPROCEDURE EVALUATION
Relevant Problems   NEURO   (+) Acute ischemic stroke (HCC)      CARDIAC   (+) Peripheral arterial disease (HCC)      ENDO   (+) Diabetic polyneuropathy associated with type 2 diabetes mellitus (HCC)      Other   (+) Osteomyelitis of left foot (HCC)       Physical Exam    Airway   Mallampati: II  TM distance: >3 FB  Neck ROM: full       Cardiovascular - normal exam  Rhythm: regular  Rate: normal  (-) murmur     Dental - normal exam         Pulmonary - normal exam  Breath sounds clear to auscultation     Abdominal    Neurological - normal exam                 Anesthesia Plan    ASA 3       Plan - general       Airway plan will be ETT        Induction: intravenous    Postoperative Plan: Postoperative administration of opioids is intended.    Pertinent diagnostic labs and testing reviewed    Informed Consent:    Anesthetic plan and risks discussed with patient.    Use of blood products discussed with: patient whom consented to blood products.

## 2019-12-20 NOTE — PROGRESS NOTES
[facility]ESS [place]     Attending: Dr. Wilberto WEBER  Resident: Teresa Canas ( PGY-1)  PATIENT: Israel Aviles; [id number]; 1965    ID: 54 y.o. male admitted for left lower extremity gangrene, LLE foot ulceration, subsequently found to have diffuse CAD by [facility], new heart failure.    SUBJECTIVE: No acute events overnight. Feels well. Denies CP, dizziness, SOB. Plan for surgery this AM.     OBJECTIVE:     Vitals:    12/19/19 1622 12/19/19 2013 12/19/19 2341 12/20/19 0443   BP: 109/62 (!) 93/56 104/61 112/69   Pulse: 94 92 89 93   Resp: 16 18 18 16   Temp: 36.7 °C (98.1 °F) 36.6 °C (97.8 °F) 36.6 °C (97.9 °F) 36.6 °C (97.8 °F)   TempSrc: Temporal Temporal Temporal Temporal   SpO2: 95% 97% 99% 94%   Weight:  77.6 kg (171 lb 1.2 oz)     Height:           Intake/Output Summary (Last 24 hours) at 12/20/2019 0732  Last data filed at 12/20/2019 0244  Gross per 24 hour   Intake 230 ml   Output 440 ml   Net -210 ml       PE:  General: No acute distress, resting comfortably in bed.  HEENT: NC/AT. PERRLA. EOMI. MMM  Cardiovascular: RRR with no M/R/G.  Respiratory: Symmetrical chest. CTAB with no adventitious breath sounds   Abdomen: soft, NT/ND, no masses, +BS   EXT:  Gangrene 2nd and 3rd toe LLE, large ulcer on dorsum of foot   Neuro: non focal with no numbness, tingling or changes in sensation    LABS:  Recent Labs     12/18/19  0230 12/18/19  1135 12/19/19  0203 12/20/19  0300   WBC 6.6  --  6.9 6.4   RBC 3.69*  --  3.30* 3.36*   HEMOGLOBIN 9.9* 9.8* 8.9* 9.2*   HEMATOCRIT 31.2*  --  28.1* 28.3*   MCV 84.6  --  85.2 84.2   MCH 26.8*  --  27.0 27.4   RDW 50.4*  --  50.5* 50.6*   PLATELETCT 129*  --  128* 129*   MPV 10.0  --  10.7 10.6   NEUTSPOLYS 81.70*  --  87.00* 77.90*   LYMPHOCYTES 13.90*  --  7.80* 15.00*   MONOCYTES 4.40  --  5.20 6.20   EOSINOPHILS 0.00  --  0.00 0.90   BASOPHILS 0.00  --  0.00 0.00   RBCMORPHOLO Normal  --  Normal Present     Recent Labs     12/18/19  0230 12/19/19  0203  12/20/19  0300   SODIUM 132* 129* 130*   POTASSIUM 4.4 4.4 5.2   CHLORIDE 100 99 100   CO2 25 22 22   BUN 16 19 22   CREATININE 0.66 0.73 0.88   CALCIUM 7.6* 7.6* 7.7*   MAGNESIUM 2.1  --  1.9     Estimated GFR/CRCL = Estimated Creatinine Clearance: 105.3 mL/min (by C-G formula based on SCr of 0.88 mg/dL).  Recent Labs     12/18/19  0230  12/19/19  0203  12/19/19  1655 12/19/19  2053 12/20/19  0300 12/20/19  0603   GLUCOSE 87  --  183*  --   --   --  251*  --    POCGLUCOSE  --    < >  --    < > 250* 281*  --  177*    < > = values in this interval not displayed.                 Recent Labs     12/17/19  1331 12/17/19  1920   APTT 83.1* 65.6*       MICROBIOLOGY: Blood cx: NGTD    IMAGING:   EC-ECHOCARDIOGRAM LTD W/ CONT   Final Result      CL-LEFT HEART CATHETERIZATION WITH POSSIBLE INTERVENTION   Final Result      CT-CTA CHEST PULMONARY ARTERY W/ RECONS   Final Result      1.  No evidence of pulmonary embolus.      2.  Patchy bilateral infiltrates and small bilateral pleural effusions.      3.  Atherosclerotic vascular calcification.      EC-ECHOCARDIOGRAM COMPLETE W/ CONT   Final Result      US-EXTREMITY VENOUS LOWER BILAT   Final Result      DX-FOOT-COMPLETE 3+ LEFT   Final Result         1.  Fracture at the neck of the third toe proximal phalanx   2.  Fracture through the base of the second toe proximal phalanx with new osteolysis, appears likely related to pathologic fracture from suspected osteomyelitis.   3.  Osteolytic at the first metatarsophalangeal joint, increased since prior study, appearance favors progressive osteomyelitis.   4.  New osteolytic changes at the first tarsometatarsal joint, appearance most compatible with osteomyelitis.   5.  New areas of osteolysis involving the second, third, and fifth metatarsal heads, concerning for osteomyelitis   6.  Atherosclerosis      DX-CHEST-PORTABLE (1 VIEW)   Final Result         1.  Hazy and reticular bilateral lung base opacities, greater on the right,  appearance suggests atelectasis and/or right lung base infiltrate.   2.  Trace right pleural effusion          MEDS:  Current medications reviewed     ASSESSMENT/PLAN:   54 y.o. male admitted for left lower extremity gangrene, LLE foot ulceration, subsequently found to have diffuse CAD by [facility], new heart failure.     # LLE gangrene, dry  # LLE doral foot ulceration  # Osteomyelitis  # Cellulitis  - 6/19 underwent left dirsalus pedis angioplasty, anterior tibial angio and atherectomy, left posterior tibial A angio   - 8/19: foot infection Cx + MSSA and e faecalis tx 6 weeks daptomycin,  - Recent trip to [facility] received linezolid and imipenem   - Surgery postponed 2/2 to NSTEMI/diffuse CAD and new HF, per cards medical optimization for ~ 1 week, ideally preform procedure under local block   - Stool PCR + carbapenmase  - ID, Cards, wound care following: appreciate recommendations       Plan:  Continue Unasyn 3g TID and Zyvox 600mg BID  Wound care per team   Contact precautions   Limb preservation arranging surgery: tentatively planned for today, unclear plan for anesthesia at this time.      # NSTEMI   # Severe/diffuse 3 vessel CAD   On admission SOB elevated trop 64 and EKG with TWI, Echo demonstrating new heart failure, underwent [facility] 12/16 show duffuse/severe disease in LAD, LCx, and RCA.  No intervention was preformed.   Cardiology following      Plan:   telemetry   Coreg, valsartan, spirolactone, asa, Plavix, atorvastatin   3 vs CABG when recovered from BKA      #Heart Failure with reduced EF  - Likely 2/2 to diffuse CAD, PE work up negative  - ECHO on 12/16 showing new severely reduced LVF of 30-35%, BNP 6000 on admission, Repeat echo 12/18  EF 45%  - Cardiology following      Plan:   Coreg 6.25 mg BID, titrate as tolerated   Valsartan 80 mg BID, titrate as tolerated   Spironolactone 25 mg today  ASA, plavix, atorvastatin    Enrolled in HF program      # Possible LV Thrombus   Echo 12/16: demonstrating possible  thrombus, heparin drip started 12/16, repeat echo 12/18 no evidence of thrombus.  Heparin d/c      # Anemia:  Unclear why hgb continues to drop pt was on heparin drip but no evidence of HIT, denies melana, is not on fluids so unlikely dilutional. Mostly likely anemia of chronic disease.  Per chart review appears baseline is 10-11.    hgb down trending from 11.3-> 9.9->8.9. Improved today at 9.2  - Iron panel with reticulocyte count.      #Type 2 Diabetes  - A1C 8.5  - Hold home  metformin and glipizide  - Lantus 15 u QHS, Low dose Correctional, Diabetic diet   - ADD nutritional coverage, 5 units AC  - Goal BS <150 for infection control      #Hyponatremia  - Correct Na of 129 on admission, currently 132   CTM      #Constipation  BM yesterday  Bowel protocol     Core Measures   VTE PPx: heparin ppx  Abx: Unasyn, zyvox  Diet: Diabetic, NPO for surgery   Fluids: No IVFs  Lines and Tube: PIV   Code Status: Full code

## 2019-12-20 NOTE — CONSULTS
REFERRING PHYSICIAN: Mahesh Segovia MD.     CONSULTING PHYSICIAN: Tanvir Mendoza MD, FACS.    CHIEF COMPLAINT: shortness of breath    HISTORY OF PRESENT ILLNESS: The patient is a 54 y.o. male with a history of diabetes mellitus type 2, hypertension, peripheral vascular disease, status post CVA, and left foot ulcers with osteomyelitis.  He presented to the emergency room on December 16 with a complaint of shortness of breath for the past 3 to 4 weeks.  The patient had just returned from [facility] he had just been off the plane only 3 hours when he came to the emergency room.  In [place] he was asked also hospitalized with the same symptoms.  He has had diabetic foot ulcers and nonhealing wounds.  He was treated for osteomyelitis with a PICC line but at that time he refused amputation.  While he was in [place] he was also given antibiotics imipenem according to the patient's son.  He denies chest pain.  He has no other associated signs or symptoms.  No aggravating or alleviating factors.  While in the emergency room he was admitted for his gangrenous toes.  All up on the floor and echo was done for his shortness of breath which was abnormal this prompted an angiogram.  I have been asked to evaluate him for consideration of coronary artery bypass grafting.    PAST MEDICAL HISTORY:   Past Medical History:   Diagnosis Date   • Diabetes (HCC)        PAST SURGICAL HISTORY:   Past Surgical History:   Procedure Laterality Date   • IRRIGATION & DEBRIDEMENT ORTHO Left 6/24/2019    Procedure: IRRIGATION AND DEBRIDEMENT, WOUND-FOOT, BIOLOGIC PLACEMENT, WOUND VAC PLACEMENT;  Surgeon: Charles Chopra M.D.;  Location: SURGERY [facility];  Service: Orthopedics       FAMILY HISTORY:   No premature coronary artery disease.    SOCIAL HISTORY:   Social History     Socioeconomic History   • Marital status:      Spouse name: Not on file   • Number of children: Not on file   • Years of education: Not on file   • Highest education  level: Not on file   Occupational History   • Not on file   Social Needs   • Financial resource strain: Not on file   • Food insecurity:     Worry: Not on file     Inability: Not on file   • Transportation needs:     Medical: Not on file     Non-medical: Not on file   Tobacco Use   • Smoking status: Never Smoker   • Smokeless tobacco: Never Used   Substance and Sexual Activity   • Alcohol use: No   • Drug use: No   • Sexual activity: Not on file   Lifestyle   • Physical activity:     Days per week: Not on file     Minutes per session: Not on file   • Stress: Not on file   Relationships   • Social connections:     Talks on phone: Not on file     Gets together: Not on file     Attends Jainism service: Not on file     Active member of club or organization: Not on file     Attends meetings of clubs or organizations: Not on file     Relationship status: Not on file   • Intimate partner violence:     Fear of current or ex partner: Not on file     Emotionally abused: Not on file     Physically abused: Not on file     Forced sexual activity: Not on file   Other Topics Concern   • Not on file   Social History Narrative   • Not on file       ALLERGIES:   No Known Allergies     CURRENT MEDICATIONS:     Current Facility-Administered Medications:   •  ampicillin/sulbactam (UNASYN) 3 g in  mL IVPB, 3 g, Intravenous, Q6HRS, Angie Gonzalez M.D., Stopped at 12/20/19 0641  •  insulin glargine (LANTUS) injection 8 Units, 8 Units, Subcutaneous, Q EVENING, 8 Units at 12/19/19 1719 **AND** insulin lispro (HUMALOG) injection 5 Units, 0.2 Units/kg/day, Subcutaneous, TID AC, Stopped at 12/20/19 0700 **AND** insulin lispro (HUMALOG) injection 1-6 Units, 1-6 Units, Subcutaneous, 4X/DAY ACHS, Stopped at 12/20/19 0700 **AND** Accu-Chek ACHS, , , Q AC AND BEDTIME(S) **AND** NOTIFY MD and PharmD, , , Once **AND** glucose 4 g chewable tablet 16 g, 16 g, Oral, Q15 MIN PRN **AND** DEXTROSE 10% BOLUS 250 mL, 250 mL, Intravenous, Q15 MIN  PRN, Nichole Morel M.D.  •  carvedilol (COREG) tablet 12.5 mg, 12.5 mg, Oral, BID WITH MEALS, Nichole Morel M.D., 12.5 mg at 12/20/19 0610  •  spironolactone (ALDACTONE) tablet 25 mg, 25 mg, Oral, Q DAY, Nichole Morel M.D., 25 mg at 12/20/19 0610  •  valsartan (DIOVAN) tablet 80 mg, 80 mg, Oral, TWICE DAILY, Nichole Morel M.D., 80 mg at 12/20/19 0610  •  senna-docusate (PERICOLACE or SENOKOT S) 8.6-50 MG per tablet 2 Tab, 2 Tab, Oral, BID, 2 Tab at 12/16/19 0757 **AND** polyethylene glycol/lytes (MIRALAX) PACKET 1 Packet, 1 Packet, Oral, QDAY PRN **AND** magnesium hydroxide (MILK OF MAGNESIA) suspension 30 mL, 30 mL, Oral, QDAY PRN **AND** bisacodyl (DULCOLAX) suppository 10 mg, 10 mg, Rectal, QDAY PRN, Yanni Ingram M.D.  •  Respiratory Care per Protocol, , Nebulization, Continuous RT, Yanni Ingram M.D.  •  enalaprilat (VASOTEC) injection 1.25 mg, 1.25 mg, Intravenous, Q6HRS PRN, Yanni Ingram M.D.  •  cloNIDine (CATAPRES) tablet 0.1 mg, 0.1 mg, Oral, Q6HRS PRN, Yanni Ingram M.D.  •  atorvastatin (LIPITOR) tablet 40 mg, 40 mg, Oral, Nightly, Yanni Ingram M.D., 40 mg at 12/19/19 2054  •  clopidogrel (PLAVIX) tablet 75 mg, 75 mg, Oral, QHS, Yanni Ingram M.D., 75 mg at 12/19/19 2054  •  aspirin EC (ECOTRIN) tablet 81 mg, 81 mg, Oral, DAILY, Yanni Ingram M.D., 81 mg at 12/20/19 0610  •  linezolid (ZYVOX) tablet 600 mg, 600 mg, Oral, Q12HRS, Angie Gonzalez M.D., 600 mg at 12/20/19 0610     LABS REVIEWED:  Lab Results   Component Value Date/Time    SODIUM 130 (L) 12/20/2019 03:00 AM    POTASSIUM 5.2 12/20/2019 03:00 AM    CHLORIDE 100 12/20/2019 03:00 AM    CO2 22 12/20/2019 03:00 AM    GLUCOSE 251 (H) 12/20/2019 03:00 AM    BUN 22 12/20/2019 03:00 AM    CREATININE 0.88 12/20/2019 03:00 AM    CREATININE 0.9 05/09/2007 01:20 AM      Lab Results   Component Value Date/Time    PROTHROMBTM 16.0 (H) 12/16/2019 04:14 PM    INR 1.25 (H) 12/16/2019 04:14 PM      Lab  Results   Component Value Date/Time    WBC 6.4 12/20/2019 03:00 AM    RBC 3.36 (L) 12/20/2019 03:00 AM    HEMOGLOBIN 9.2 (L) 12/20/2019 03:00 AM    HEMATOCRIT 28.3 (L) 12/20/2019 03:00 AM    MCV 84.2 12/20/2019 03:00 AM    MCH 27.4 12/20/2019 03:00 AM    MCHC 32.5 (L) 12/20/2019 03:00 AM    MPV 10.6 12/20/2019 03:00 AM    NEUTSPOLYS 77.90 (H) 12/20/2019 03:00 AM    LYMPHOCYTES 15.00 (L) 12/20/2019 03:00 AM    MONOCYTES 6.20 12/20/2019 03:00 AM    EOSINOPHILS 0.90 12/20/2019 03:00 AM    BASOPHILS 0.00 12/20/2019 03:00 AM    ANISOCYTOSIS 1+ 12/20/2019 03:00 AM        IMAGING REVIEWED AND INTERPRETED:    ECHOCARDIOGRAM:   Limited Exam for LV Apical Thrombus.   No thrombus. Mildly reduced left ventricular systolic function.  Left ventricular ejection fraction is visually estimated to be 45%.  There is apical septum and apical akinesis.     Compared to the Prior Echo on 12/16/19: The LVEF has improved from 35%   to 45%. There was no LV thrombus visualized on either study.     ANGIOGRAM:   Coronary Diagnostic Findings    * Left main: Distal tapering with 50% stenosis.    * Left anterior descending: Diffuse atherosclerosis and negative remodeling  beginning at the origin.  There are sequential 70% stenosis in the proximal  and mid segment of the vessel.  There is subtotal occlusion in the mid to  distal segment with AGATHA II flow into the apical LAD. The first diagonal has  proximal 70% stenosis and is diffusely diseased and negatively remodeled with  95% stenosis in the lower branch.    * Left circumflex: Mid vessel 70-80% stenosis.  The OM1 has diffuse  atherosclerosis there is less than 1.5 mm in size and without significant  narrowing.  The OM 2 bifurcates proximally the upper branch has proximal 50%  stenosis and is a less than 1.5 mm vessel.  The lower branch is a 2.25 mm  vessel and has proximal 85% stenosis.    * Right coronary artery: Dominant, diffuse 60% stenosis in the proximal mid  and distal AV groove.  There  is a dual PDA.  The first PDA has a mid 80%  stenosis and diffuse disease downstream.  The second PDA has proximal 80%  stenosis.    REVIEW OF SYSTEMS:   Review of Systems   Constitutional: Positive for malaise/fatigue.   HENT: Negative.    Eyes: Negative.    Respiratory: Positive for shortness of breath.    Cardiovascular: Positive for PND.   Gastrointestinal: Negative.    Genitourinary: Negative.    Musculoskeletal:        Necrotic toes.   Skin: Negative.    Neurological: Negative.    Endo/Heme/Allergies: Negative.    Psychiatric/Behavioral: Negative.      All other systems were reviewed with the patient and are negative.    PHYSICAL EXAMINATION:     CONSTITUTIONAL:  /57   Pulse 90   Temp 36.6 °C (97.8 °F) (Temporal)   Resp 16   Ht 1.829 m (6')   Wt 77.6 kg (171 lb 1.2 oz)   SpO2 97%     Physical Exam   Constitutional: He is oriented to person, place, and time and well-developed, well-nourished, and in no distress.   HENT:   Head: Normocephalic and atraumatic.   Eyes: Pupils are equal, round, and reactive to light.   Neck: Normal range of motion. Neck supple. No JVD present.   Cardiovascular: Normal rate and regular rhythm.   Pulmonary/Chest: Effort normal.   Abdominal: Soft. Bowel sounds are normal.   Musculoskeletal: Normal range of motion.         General: Edema present.      Comments: Right foot-gangrenous toes and open wounds.   Neurological: He is alert and oriented to person, place, and time.   Skin: Skin is warm and dry.   Psychiatric: Mood, memory, affect and judgment normal.     IMPRESSION: 54-year-old diabetic male who presented with lower extremity gangrene in need of amputation.  Preoperative evaluation included left heart catheterization with angiogram.  Angiogram revealed severe three-vessel coronary artery disease but very poor distal targets for bypass.  Especially in the distribution of the left anterior descending there is no bypassable target.  In addition, the patient has extensive  collateral circulation in his coronary arteries.  Therefore I believe the patient is not a candidate for coronary artery bypass grafting, the risks do not justify the benefit.    PLAN:  I recommend guideline directed optimal medical therapy of his coronary artery disease.  No surgical intervention on his coronary arteries.    The STS mortality risk score is 5% and the morbidity and mortality risk score is 30%. The scores were discussed with patient.    Findings and recommendations have been discussed with the patient’s cardiologist Sachin Aquino.  Thank you for this very challenging consultation and participation in the patient’s care.  I will keep you apprised of all future developments.      Sincerely,       Tanvir Mendoza MD, FACS.    I,  Tanvir Mendoza MD, FACS performed a substantial portion of the EM visit face-to-face with the same patient on the same date of service with, YAMILKA Padilla. I was personally involved in reviewing and interpreting the films and conducted elements of the history and physical exam. I performed all of the medical decision making for the patient.

## 2019-12-21 LAB
ANION GAP SERPL CALC-SCNC: 9 MMOL/L (ref 0–11.9)
BACTERIA BLD CULT: NORMAL
BACTERIA BLD CULT: NORMAL
BASOPHILS # BLD AUTO: 0.4 % (ref 0–1.8)
BASOPHILS # BLD: 0.02 K/UL (ref 0–0.12)
BUN SERPL-MCNC: 17 MG/DL (ref 8–22)
CALCIUM SERPL-MCNC: 8 MG/DL (ref 8.5–10.5)
CHLORIDE SERPL-SCNC: 98 MMOL/L (ref 96–112)
CO2 SERPL-SCNC: 23 MMOL/L (ref 20–33)
CREAT SERPL-MCNC: 0.65 MG/DL (ref 0.5–1.4)
EOSINOPHIL # BLD AUTO: 0.1 K/UL (ref 0–0.51)
EOSINOPHIL NFR BLD: 1.9 % (ref 0–6.9)
ERYTHROCYTE [DISTWIDTH] IN BLOOD BY AUTOMATED COUNT: 53.5 FL (ref 35.9–50)
GLUCOSE BLD-MCNC: 188 MG/DL (ref 65–99)
GLUCOSE BLD-MCNC: 215 MG/DL (ref 65–99)
GLUCOSE BLD-MCNC: 245 MG/DL (ref 65–99)
GLUCOSE SERPL-MCNC: 207 MG/DL (ref 65–99)
GRAM STN SPEC: NORMAL
HCT VFR BLD AUTO: 30.5 % (ref 42–52)
HGB BLD-MCNC: 9.5 G/DL (ref 14–18)
IMM GRANULOCYTES # BLD AUTO: 0.02 K/UL (ref 0–0.11)
IMM GRANULOCYTES NFR BLD AUTO: 0.4 % (ref 0–0.9)
LACTATE BLD-SCNC: 1.5 MMOL/L (ref 0.5–2)
LYMPHOCYTES # BLD AUTO: 0.84 K/UL (ref 1–4.8)
LYMPHOCYTES NFR BLD: 15.9 % (ref 22–41)
MCH RBC QN AUTO: 27.4 PG (ref 27–33)
MCHC RBC AUTO-ENTMCNC: 31.1 G/DL (ref 33.7–35.3)
MCV RBC AUTO: 87.9 FL (ref 81.4–97.8)
MONOCYTES # BLD AUTO: 0.42 K/UL (ref 0–0.85)
MONOCYTES NFR BLD AUTO: 7.9 % (ref 0–13.4)
NEUTROPHILS # BLD AUTO: 3.89 K/UL (ref 1.82–7.42)
NEUTROPHILS NFR BLD: 73.5 % (ref 44–72)
NRBC # BLD AUTO: 0 K/UL
NRBC BLD-RTO: 0 /100 WBC
PLATELET # BLD AUTO: 149 K/UL (ref 164–446)
PMV BLD AUTO: 10.2 FL (ref 9–12.9)
POTASSIUM SERPL-SCNC: 4.5 MMOL/L (ref 3.6–5.5)
RBC # BLD AUTO: 3.47 M/UL (ref 4.7–6.1)
SIGNIFICANT IND 70042: NORMAL
SITE SITE: NORMAL
SODIUM SERPL-SCNC: 130 MMOL/L (ref 135–145)
SOURCE SOURCE: NORMAL
WBC # BLD AUTO: 5.3 K/UL (ref 4.8–10.8)

## 2019-12-21 PROCEDURE — A9270 NON-COVERED ITEM OR SERVICE: HCPCS | Performed by: INTERNAL MEDICINE

## 2019-12-21 PROCEDURE — 700105 HCHG RX REV CODE 258: Performed by: INTERNAL MEDICINE

## 2019-12-21 PROCEDURE — 82962 GLUCOSE BLOOD TEST: CPT

## 2019-12-21 PROCEDURE — A9270 NON-COVERED ITEM OR SERVICE: HCPCS | Performed by: STUDENT IN AN ORGANIZED HEALTH CARE EDUCATION/TRAINING PROGRAM

## 2019-12-21 PROCEDURE — 770020 HCHG ROOM/CARE - TELE (206)

## 2019-12-21 PROCEDURE — 99232 SBSQ HOSP IP/OBS MODERATE 35: CPT | Performed by: INTERNAL MEDICINE

## 2019-12-21 PROCEDURE — 700102 HCHG RX REV CODE 250 W/ 637 OVERRIDE(OP): Performed by: STUDENT IN AN ORGANIZED HEALTH CARE EDUCATION/TRAINING PROGRAM

## 2019-12-21 PROCEDURE — 700102 HCHG RX REV CODE 250 W/ 637 OVERRIDE(OP): Performed by: NURSE PRACTITIONER

## 2019-12-21 PROCEDURE — 80048 BASIC METABOLIC PNL TOTAL CA: CPT

## 2019-12-21 PROCEDURE — 85025 COMPLETE CBC W/AUTO DIFF WBC: CPT

## 2019-12-21 PROCEDURE — 99232 SBSQ HOSP IP/OBS MODERATE 35: CPT | Performed by: NURSE PRACTITIONER

## 2019-12-21 PROCEDURE — 87040 BLOOD CULTURE FOR BACTERIA: CPT

## 2019-12-21 PROCEDURE — 700105 HCHG RX REV CODE 258: Performed by: STUDENT IN AN ORGANIZED HEALTH CARE EDUCATION/TRAINING PROGRAM

## 2019-12-21 PROCEDURE — 700111 HCHG RX REV CODE 636 W/ 250 OVERRIDE (IP): Performed by: STUDENT IN AN ORGANIZED HEALTH CARE EDUCATION/TRAINING PROGRAM

## 2019-12-21 PROCEDURE — 700111 HCHG RX REV CODE 636 W/ 250 OVERRIDE (IP): Performed by: INTERNAL MEDICINE

## 2019-12-21 PROCEDURE — 83605 ASSAY OF LACTIC ACID: CPT

## 2019-12-21 PROCEDURE — 36415 COLL VENOUS BLD VENIPUNCTURE: CPT

## 2019-12-21 PROCEDURE — 700102 HCHG RX REV CODE 250 W/ 637 OVERRIDE(OP): Performed by: INTERNAL MEDICINE

## 2019-12-21 PROCEDURE — A9270 NON-COVERED ITEM OR SERVICE: HCPCS | Performed by: NURSE PRACTITIONER

## 2019-12-21 RX ORDER — CARVEDILOL 25 MG/1
25 TABLET ORAL 2 TIMES DAILY WITH MEALS
Status: DISCONTINUED | OUTPATIENT
Start: 2019-12-22 | End: 2019-12-22

## 2019-12-21 RX ORDER — ATORVASTATIN CALCIUM 80 MG/1
80 TABLET, FILM COATED ORAL NIGHTLY
Status: DISCONTINUED | OUTPATIENT
Start: 2019-12-21 | End: 2019-12-27 | Stop reason: HOSPADM

## 2019-12-21 RX ORDER — ACETAMINOPHEN 500 MG
500 TABLET ORAL EVERY 6 HOURS PRN
Status: DISCONTINUED | OUTPATIENT
Start: 2019-12-21 | End: 2019-12-27 | Stop reason: HOSPADM

## 2019-12-21 RX ORDER — SODIUM CHLORIDE 9 MG/ML
250 INJECTION, SOLUTION INTRAVENOUS ONCE
Status: COMPLETED | OUTPATIENT
Start: 2019-12-21 | End: 2019-12-21

## 2019-12-21 RX ORDER — MORPHINE SULFATE 4 MG/ML
1 INJECTION, SOLUTION INTRAMUSCULAR; INTRAVENOUS
Status: DISCONTINUED | OUTPATIENT
Start: 2019-12-21 | End: 2019-12-27 | Stop reason: HOSPADM

## 2019-12-21 RX ORDER — SODIUM CHLORIDE 9 MG/ML
INJECTION, SOLUTION INTRAVENOUS
Status: ACTIVE
Start: 2019-12-21 | End: 2019-12-22

## 2019-12-21 RX ORDER — CLOPIDOGREL BISULFATE 75 MG/1
75 TABLET ORAL
Status: DISCONTINUED | OUTPATIENT
Start: 2019-12-21 | End: 2019-12-27 | Stop reason: HOSPADM

## 2019-12-21 RX ORDER — OXYCODONE HYDROCHLORIDE AND ACETAMINOPHEN 5; 325 MG/1; MG/1
1-2 TABLET ORAL EVERY 4 HOURS PRN
Status: DISCONTINUED | OUTPATIENT
Start: 2019-12-21 | End: 2019-12-27 | Stop reason: HOSPADM

## 2019-12-21 RX ORDER — HEPARIN SODIUM 5000 [USP'U]/ML
5000 INJECTION, SOLUTION INTRAVENOUS; SUBCUTANEOUS EVERY 8 HOURS
Status: DISCONTINUED | OUTPATIENT
Start: 2019-12-21 | End: 2019-12-27 | Stop reason: HOSPADM

## 2019-12-21 RX ADMIN — ACETAMINOPHEN 500 MG: 500 TABLET ORAL at 20:35

## 2019-12-21 RX ADMIN — ASPIRIN 81 MG: 81 TABLET, COATED ORAL at 06:06

## 2019-12-21 RX ADMIN — CARVEDILOL 12.5 MG: 12.5 TABLET, FILM COATED ORAL at 06:06

## 2019-12-21 RX ADMIN — SODIUM CHLORIDE 250 ML: 9 INJECTION, SOLUTION INTRAVENOUS at 20:38

## 2019-12-21 RX ADMIN — AMPICILLIN SODIUM AND SULBACTAM SODIUM 3 G: 2; 1 INJECTION, POWDER, FOR SOLUTION INTRAMUSCULAR; INTRAVENOUS at 11:05

## 2019-12-21 RX ADMIN — INSULIN GLARGINE 15 UNITS: 100 INJECTION, SOLUTION SUBCUTANEOUS at 17:11

## 2019-12-21 RX ADMIN — OXYCODONE HYDROCHLORIDE AND ACETAMINOPHEN 1 TABLET: 5; 325 TABLET ORAL at 00:23

## 2019-12-21 RX ADMIN — VALSARTAN 80 MG: 80 TABLET, FILM COATED ORAL at 06:06

## 2019-12-21 RX ADMIN — ATORVASTATIN CALCIUM 80 MG: 80 TABLET, FILM COATED ORAL at 20:35

## 2019-12-21 RX ADMIN — VALSARTAN 80 MG: 80 TABLET, FILM COATED ORAL at 16:59

## 2019-12-21 RX ADMIN — AMPICILLIN SODIUM AND SULBACTAM SODIUM 3 G: 2; 1 INJECTION, POWDER, FOR SOLUTION INTRAMUSCULAR; INTRAVENOUS at 17:06

## 2019-12-21 RX ADMIN — CLOPIDOGREL BISULFATE 75 MG: 75 TABLET ORAL at 20:35

## 2019-12-21 RX ADMIN — HEPARIN SODIUM 5000 UNITS: 5000 INJECTION, SOLUTION INTRAVENOUS; SUBCUTANEOUS at 17:03

## 2019-12-21 RX ADMIN — LINEZOLID 600 MG: 600 TABLET, FILM COATED ORAL at 06:06

## 2019-12-21 RX ADMIN — HEPARIN SODIUM 5000 UNITS: 5000 INJECTION, SOLUTION INTRAVENOUS; SUBCUTANEOUS at 20:33

## 2019-12-21 RX ADMIN — AMPICILLIN SODIUM AND SULBACTAM SODIUM 3 G: 2; 1 INJECTION, POWDER, FOR SOLUTION INTRAMUSCULAR; INTRAVENOUS at 06:07

## 2019-12-21 RX ADMIN — SPIRONOLACTONE 25 MG: 25 TABLET ORAL at 06:06

## 2019-12-21 RX ADMIN — LINEZOLID 600 MG: 600 TABLET, FILM COATED ORAL at 16:59

## 2019-12-21 RX ADMIN — CARVEDILOL 12.5 MG: 12.5 TABLET, FILM COATED ORAL at 16:59

## 2019-12-21 RX ADMIN — OXYCODONE HYDROCHLORIDE AND ACETAMINOPHEN 1 TABLET: 5; 325 TABLET ORAL at 06:09

## 2019-12-21 RX ADMIN — OXYCODONE HYDROCHLORIDE AND ACETAMINOPHEN 1 TABLET: 5; 325 TABLET ORAL at 11:13

## 2019-12-21 ASSESSMENT — ENCOUNTER SYMPTOMS
STRIDOR: 0
COUGH: 0
WHEEZING: 0
CHILLS: 0
HEMOPTYSIS: 0
APNEA: 0
SPUTUM PRODUCTION: 0
FEVER: 0
CHEST TIGHTNESS: 0
CHOKING: 0
SHORTNESS OF BREATH: 0

## 2019-12-21 NOTE — ANESTHESIA POSTPROCEDURE EVALUATION
Patient: Israel Aviles    Procedure Summary     Date:  12/20/19 Room / Location:  [place] [id number] / SURGERY [facility]    Anesthesia Start:  1433 Anesthesia Stop:  1639    Procedure:  AMPUTATION, BELOW KNEE (Left Leg Lower) Diagnosis:  (left foot gangrenous wound)    Surgeon:  Koby Zhao M.D. Responsible Provider:  Juan Miguel Dsouza M.D.    Anesthesia Type:  general ASA Status:  3          Final Anesthesia Type: general  Last vitals  BP   Blood Pressure: (!) 85/54    Temp   36.2 °C (97.2 °F)    Pulse   Pulse: 76   Resp   (!) 24    SpO2   99 %      Anesthesia Post Evaluation    Patient location during evaluation: PACU  Patient participation: complete - patient participated  Level of consciousness: awake and alert  Pain score: 3    Airway patency: patent  Anesthetic complications: no  Cardiovascular status: hemodynamically stable  Respiratory status: acceptable  Hydration status: euvolemic    PONV: none           Nurse Pain Score: 0 (NPRS)

## 2019-12-21 NOTE — OR SURGEON
Immediate Post OP Note    PreOp Diagnosis: left foot OM and chronic wounds    PostOp Diagnosis: same    Procedure(s):  AMPUTATION, BELOW KNEE - Wound Class: Dirty or Infected    Surgeon(s):  Koby Zhao M.D.    Anesthesiologist/Type of Anesthesia:  Anesthesiologist: Juan Miguel Dsouza M.D./General    Surgical Staff:  Circulator: Heather C Cogan, R.N.  Scrub Person: Thomas Dejesus R.N.; Christiana Glass Assist: Reinier Connors P.A.-C.    Specimens removed if any:  ID Type Source Tests Collected by Time Destination   1 : risidual left leg Tissue Leg AEROBIC/ANAEROBIC CULTURE (SURGERY) Koby Zhao M.D. 12/20/2019  3:12 PM    A : Left leg below knee amputation Tissue Leg PATHOLOGY SPECIMEN Koby Zhao M.D. 12/20/2019  4:33 PM        Estimated Blood Loss: 50cc    Findings: same as dx, edema in leg musculature, repeat cultures of tissue prior to closure    Complications: none      PLAN  NWB LLE  Drain until less than 20cc per shift  PT/OT  Mobilize  Knee immobilizer at rest        12/20/2019 4:43 PM Koby Zhao M.D.

## 2019-12-21 NOTE — ANESTHESIA TIME REPORT
Anesthesia Start and Stop Event Times     Date Time Event    12/20/2019 1348 Ready for Procedure     1433 Anesthesia Start     1639 Anesthesia Stop        Responsible Staff  12/20/19    Name Role Begin End    Juan Miguel Dsouza M.D. Anesth 1433 1639        Preop Diagnosis (Free Text):  Pre-op Diagnosis     left foot gangrenous wound        Preop Diagnosis (Codes):    Post op Diagnosis  Diabetic ulcer of left foot (HCC)      Premium Reason  K. Alert    Comments:

## 2019-12-21 NOTE — PROGRESS NOTES
Received report from day shift RN, Rabia. Pt is sleeping in bed with family at bedside. Assessment completed, answers commands and states that pain is 4/10 and does not want any pain medication. Pt is strict NPO due to coughing during speech eval. Bed in lowest locked position, personal belongings within reach. POC addressed, white board updated. No further questions at this time.

## 2019-12-21 NOTE — PROGRESS NOTES
Patient is AO x 4, RASS -2, and is self-rating 6/10 pain while awake.  Conservative PO medication given for pain management.  Surgical dressing CDI.  VSS on 2L NC.  POC reviewed, PIVs verified, and safety protocols in place.  Patient tolerating small sips of PO fluids.  Wife updated over the phone x 1 from PACU.

## 2019-12-21 NOTE — CARE PLAN
Problem: Safety  Goal: Will remain free from injury  Outcome: PROGRESSING AS EXPECTED  Note:   Fall precautions in place. Bed in lowest position. Non-skid socks in place. Personal possessions within reach. Mobility sign on door. Bed-alarm on. Call light within reach. Pt educated regarding fall prevention and states understanding.       Problem: Infection  Goal: Will remain free from infection  Outcome: PROGRESSING AS EXPECTED  Note:   Pt educated on importance of hand hygiene to reduce the risk of infection. Staff taking proper isolation precautions

## 2019-12-21 NOTE — CARE PLAN
Problem: Respiratory:  Goal: Respiratory status will improve  Outcome: PROGRESSING AS EXPECTED  Note:   Pt on 2L NC.  Pt is on room air at baseline. Pt titrated down to baseline as tolerated.      Problem: Pain Management  Goal: Pain level will decrease to patient's comfort goal  Outcome: PROGRESSING AS EXPECTED  Note:   Pt medicated as needed for pain per MAR. Pt offered non-pharmacological means of pain management

## 2019-12-21 NOTE — PROGRESS NOTES
Cardiology Follow Up Progress Note    Date of Service  12/21/2019    Attending Physician  Donny Mcnally M.D.      Presents with toe gangrene and found to have osteomyelitis    Cardiology consultation for preop evaluation given abnormal EKG and elevated troponin peaked to 64    History of type 2 diabetes, hypertension, peripheral arterial disease status post angioplasty diabetic ulcer of left midfoot    Labs reviewed  Hemoglobin 9.5, hematocrit 30, platelet count 149, sodium 130, potassium 4.5, creatinine stable    Blood pressure 111/66  Rhythm sinus    Interim Events    12/21/19, stable post- left below the knee amputation on 12/20/19, no overnight cardiac events, sinus rhythm, no angina, denies dyspnea       12/20/19 no overnight cardiac events, maintaining sinus rhythm, n.p.o., plan for left below the knee amputation this afternoon, denies angina, no dyspnea questions answered.    Review of Systems  Review of Systems   Respiratory: Negative for apnea, cough, choking, chest tightness, shortness of breath, wheezing and stridor.        Vital signs in last 24 hours  Temp:  [35.7 °C (96.3 °F)-36.6 °C (97.9 °F)] 36.6 °C (97.9 °F)  Pulse:  [70-93] 93  Resp:  [16-24] 18  BP: ()/(54-73) 111/66  SpO2:  [90 %-100 %] 99 %    Physical Exam  Physical Exam  Cardiovascular:      Pulses: Normal pulses.   Pulmonary:      Effort: Pulmonary effort is normal.   Abdominal:      General: Abdomen is flat.   Skin:     General: Skin is warm.   Neurological:      General: No focal deficit present.      Mental Status: He is alert.   Psychiatric:         Mood and Affect: Mood normal.         Lab Review  Lab Results   Component Value Date/Time    WBC 5.3 12/21/2019 03:18 AM    RBC 3.47 (L) 12/21/2019 03:18 AM    HEMOGLOBIN 9.5 (L) 12/21/2019 03:18 AM    HEMATOCRIT 30.5 (L) 12/21/2019 03:18 AM    MCV 87.9 12/21/2019 03:18 AM    MCH 27.4 12/21/2019 03:18 AM    MCHC 31.1 (L) 12/21/2019 03:18 AM    MPV 10.2 12/21/2019 03:18 AM       Lab Results   Component Value Date/Time    SODIUM 130 (L) 12/21/2019 03:18 AM    POTASSIUM 4.5 12/21/2019 03:18 AM    CHLORIDE 98 12/21/2019 03:18 AM    CO2 23 12/21/2019 03:18 AM    GLUCOSE 207 (H) 12/21/2019 03:18 AM    BUN 17 12/21/2019 03:18 AM    CREATININE 0.65 12/21/2019 03:18 AM    CREATININE 0.9 05/09/2007 01:20 AM      Lab Results   Component Value Date/Time    ASTSGOT 43 12/16/2019 03:32 AM    ALTSGPT 48 12/16/2019 03:32 AM     Lab Results   Component Value Date/Time    CHOLSTRLTOT 262 (H) 06/15/2018 10:20 PM     (H) 06/15/2018 10:20 PM    HDL 54 06/15/2018 10:20 PM    TRIGLYCERIDE 132 06/15/2018 10:20 PM    TROPONINT 44 (H) 12/16/2019 08:30 AM       No results for input(s): NTPROBNP in the last 72 hours.    Cardiac Imaging and Procedures Review  EKG:  SR , Twave inversion    Echocardiogram: No thrombus. Mildly reduced left ventricular systolic function.Left ventricular ejection fraction is visually estimated to be 45%.There is apical septum and apical akinesis.       Cardiac Catheterization:  12/16/19    1. Severe LV systolic dysfunction by noninvasive evaluation.    2. Severe and diffuse obstructive three-vessel coronary artery disease.    3. Normal LVEDP.    Imaging  Chest X-Ray: Hazy and reticular bilateral lung base opacities, greater on the right, appearance suggests atelectasis and/or right lung base infiltrate.  2.  Trace right pleural effusion      Assessment/Plan    New cardiomyopathy, EF 45%  -Status post left heart cath 12/16/19 revealed multivessel CAD  -Appreciate CTS consult.  CTS recommended optimal medical therapy, no surgical intervention recommended.  -Continue with aspirin 81, Lipitor 80, carvedilol 12.5 mg BID, Plavix 75 mg, Diovan 80 mg, spironolactone 25 mg.  -denies angina      Chronic left foot ulcerations complicated by diabetes, osteomyelitis, left second and third toe gangrene, failed limb salvage  -s/p left below the knee amputation 12/20/19  -Appreciate ID  -On  Unasyn and Zyvox    Will follow     Please contact me with any questions.    CATHY Ndiaye.   Cardiologist, [facility]  [phone]

## 2019-12-21 NOTE — PROGRESS NOTES
Patient c/o 6/10 pain.  Denies fevers or chills.    AFVSS    Dressings clean and dry.  Immobilizers on both LE, no explanation as to why from patient, RN or notes.  Minimal output from the drain.    Plan to D/C immobilizers and D/C drain.  Continue IV antibiotics.

## 2019-12-21 NOTE — PROGRESS NOTES
Pt passed speech eval. Refused to take plavix and losartan due to still having a sore throat. Pt is asking for food and on call [facility] was notified. GI soft diet ordered. On call [facility]  Aware that pt refused medications.

## 2019-12-21 NOTE — PROGRESS NOTES
"Patient back from PACU at this time. Patient awakens to voice, still groggy. Opens eyes, answers \"yes\" \"no\" questions. On telemetry, confirmed with monitor room. Denies nausea. Attempted swallow eval, patient did cough with sip of water, keep NPO at this time and reevaluate at later time. In bed with alarm on, wife at bedside. Call bell in hand, post op vital signs in place per protocol.   "

## 2019-12-21 NOTE — PROGRESS NOTES
[facility] PROGRESS NOTE     Attending: Dr. Grace Gentile  Resident: Teresa Canas ( PGY-1)  PATIENT: Israel Aviles; [id number]; 1965    ID: 54 y.o. male admitted for left lower extremity gangrene, LLE foot ulceration, subsequently found to have diffuse CAD by [facility], new heart failure.    SUBJECTIVE: No acute events overnight. Patient underwent BKA yesterday afternoon. Today patient is resting in bed. Mentioning he has pain in his left leg. No question or concerns. Denies CP, SOB, dizziness, chills, fevers    OBJECTIVE:     Vitals:    12/20/19 2130 12/21/19 0000 12/21/19 0030 12/21/19 0431   BP: 105/65 113/73 111/65 107/62   Pulse: 73 77 77 89   Resp: 18 16 18 18   Temp:  (!) 35.7 °C (96.3 °F)  36.1 °C (97 °F)   TempSrc:  Temporal  Temporal   SpO2: 98% 99% 99% 100%   Weight:       Height:           Intake/Output Summary (Last 24 hours) at 12/21/2019 0639  Last data filed at 12/20/2019 1638  Gross per 24 hour   Intake 1000 ml   Output 450 ml   Net 550 ml       PE:  General: No acute distress, resting comfortably in bed.  HEENT: NC/AT. PERRLA. EOMI. MMM  Cardiovascular: RRR with no M/R/G.  Respiratory: Symmetrical chest. CTAB with no adventitious breath sounds   Abdomen: soft, NT/ND, no masses, +BS   EXT:  BKA of left lower extremity, surgical dressing in place  Neuro: AAO x 3    LABS:  Recent Labs     12/19/19  0203 12/20/19  0300 12/21/19  0318   WBC 6.9 6.4 5.3   RBC 3.30* 3.36* 3.47*   HEMOGLOBIN 8.9* 9.2* 9.5*   HEMATOCRIT 28.1* 28.3* 30.5*   MCV 85.2 84.2 87.9   MCH 27.0 27.4 27.4   RDW 50.5* 50.6* 53.5*   PLATELETCT 128* 129* 149*   MPV 10.7 10.6 10.2   NEUTSPOLYS 87.00* 77.90* 73.50*   LYMPHOCYTES 7.80* 15.00* 15.90*   MONOCYTES 5.20 6.20 7.90   EOSINOPHILS 0.00 0.90 1.90   BASOPHILS 0.00 0.00 0.40   RBCMORPHOLO Normal Present  --      Recent Labs     12/19/19  0203 12/20/19  0300 12/21/19  0318   SODIUM 129* 130* 130*   POTASSIUM 4.4 5.2 4.5   CHLORIDE 99 100 98   CO2 22 22 23   BUN 19 22 17    CREATININE 0.73 0.88 0.65   CALCIUM 7.6* 7.7* 8.0*   MAGNESIUM  --  1.9  --      Estimated GFR/CRCL = Estimated Creatinine Clearance: 142.6 mL/min (by C-G formula based on SCr of 0.65 mg/dL).  Recent Labs     12/19/19  0203  12/20/19  0300  12/20/19  1652 12/20/19  1840 12/20/19  2224 12/21/19  0318   GLUCOSE 183*  --  251*  --   --   --   --  207*   POCGLUCOSE  --    < >  --    < > 117* 121* 127*  --     < > = values in this interval not displayed.                 No results for input(s): INR, APTT, FIBRINOGEN in the last 72 hours.    Invalid input(s): DIMER      IMAGING: reviewed    MEDS:  Current medications reviewed     ASSESSMENT/PLAN:   54 y.o. male admitted for left lower extremity gangrene, LLE foot ulceration, subsequently found to have diffuse CAD by [facility], new heart failure.     # LLE gangrene, dry -s/p BKA  # LLE doral foot ulceration - s/p BKA  # Osteomyelitis - s/p BKA  # Cellulitis -s/p BKA  - 6/19 underwent left dorsalus pedis angioplasty, anterior tibial angio and atherectomy, left posterior tibial A angio   - 8/19: foot infection Cx + MSSA and e faecalis tx 6 weeks daptomycin,  - Recent trip to [facility] received linezolid and imipenem   - Surgery postponed 2/2 to NSTEMI/diffuse CAD and new HF, per cards medical optimization for ~ 1 week  - Stool PCR + carbapenmase  -12/20 patient underwent Left BKA   - ID, Cards, wound care following: appreciate recommendations       Plan:  Continue Unasyn 3g TID and Zyvox 600mg BID for 24hr-48hrs post surgery per ID  Follow up ortho recommendations post op  Wound care per team   Contact precautions   PT/OT consult needed      # NSTEMI   # Severe/diffuse 3 vessel CAD   On admission SOB elevated trop 64 and EKG with TWI, Echo demonstrating new heart failure, underwent [facility] 12/16 show duffuse/severe disease in LAD, LCx, and RCA.  No intervention was preformed.   Cardiology following   Per cardiothoracic surgery patient not candidate for CABG, recommend cardiac medical  optimization     Plan:   Telemetry   Continue Coreg, valsartan, spirolactone, asa, Plavix, atorvastatin   Follow up cardiology recs      #Heart Failure with reduced EF  - Likely 2/2 to diffuse CAD, PE work up negative  - ECHO on 12/16 showing new severely reduced LVF of 30-35%, BNP 6000 on admission, Repeat echo 12/18  EF 45%  - Cardiology following      Plan:   Coreg 6.25 mg BID, titrate as tolerated   Valsartan 80 mg BID, titrate as tolerated   Spironolactone 25 mg today  ASA, plavix, atorvastatin    Enrolled in HF program      # Possible LV Thrombus   Echo 12/16: demonstrating possible thrombus, heparin drip started 12/16, repeat echo 12/18 no evidence of thrombus.  Heparin d/c      # Anemia:  Unclear why hgb continues to drop pt was on heparin drip but no evidence of HIT, denies melana, is not on fluids so unlikely dilutional. Mostly likely anemia of chronic disease.  Per chart review appears baseline is 10-11.    hgb down trending from 11.3-> 9.9->8.9. Improved today at 9.5 s/p surgery       #Type 2 Diabetes  - A1C 8.5  - Hold home  metformin and glipizide  - Lantus 15 u QHS, Low dose Correctional, Diabetic diet   - ADD nutritional coverage, 5 units AC  - Goal BS <150 for infection control      #Hyponatremia  - Correct Na of 129 on admission, currently 132   CTM      #Constipation  BM yesterday  Bowel protocol     Core Measures   VTE PPx: heparin ppx  Abx: Unasyn, zyvox  Diet: Diabetic, NPO for surgery   Fluids: No IVFs  Lines and Tube: PIV   Code Status: Full code

## 2019-12-21 NOTE — PROGRESS NOTES
Received report from day shift RN, Rabia. Pt is resting in bed with family at bedside. Bed in lowest locked position, call light and personal belongings within reach. POC addressed, white board updated. No further questions at this time.

## 2019-12-22 LAB
ANION GAP SERPL CALC-SCNC: 7 MMOL/L (ref 0–11.9)
BASOPHILS # BLD AUTO: 0.3 % (ref 0–1.8)
BASOPHILS # BLD: 0.02 K/UL (ref 0–0.12)
BUN SERPL-MCNC: 17 MG/DL (ref 8–22)
CALCIUM SERPL-MCNC: 7.6 MG/DL (ref 8.5–10.5)
CHLORIDE SERPL-SCNC: 96 MMOL/L (ref 96–112)
CO2 SERPL-SCNC: 26 MMOL/L (ref 20–33)
CREAT SERPL-MCNC: 0.73 MG/DL (ref 0.5–1.4)
EOSINOPHIL # BLD AUTO: 0.05 K/UL (ref 0–0.51)
EOSINOPHIL NFR BLD: 0.7 % (ref 0–6.9)
ERYTHROCYTE [DISTWIDTH] IN BLOOD BY AUTOMATED COUNT: 52.3 FL (ref 35.9–50)
GLUCOSE BLD-MCNC: 152 MG/DL (ref 65–99)
GLUCOSE BLD-MCNC: 230 MG/DL (ref 65–99)
GLUCOSE BLD-MCNC: 233 MG/DL (ref 65–99)
GLUCOSE BLD-MCNC: 248 MG/DL (ref 65–99)
GLUCOSE SERPL-MCNC: 175 MG/DL (ref 65–99)
HCT VFR BLD AUTO: 31.2 % (ref 42–52)
HGB BLD-MCNC: 9.6 G/DL (ref 14–18)
IMM GRANULOCYTES # BLD AUTO: 0.02 K/UL (ref 0–0.11)
IMM GRANULOCYTES NFR BLD AUTO: 0.3 % (ref 0–0.9)
LACTATE BLD-SCNC: 1.9 MMOL/L (ref 0.5–2)
LACTATE BLD-SCNC: 2.6 MMOL/L (ref 0.5–2)
LYMPHOCYTES # BLD AUTO: 1.11 K/UL (ref 1–4.8)
LYMPHOCYTES NFR BLD: 16.3 % (ref 22–41)
MCH RBC QN AUTO: 26.7 PG (ref 27–33)
MCHC RBC AUTO-ENTMCNC: 30.8 G/DL (ref 33.7–35.3)
MCV RBC AUTO: 86.7 FL (ref 81.4–97.8)
MONOCYTES # BLD AUTO: 0.65 K/UL (ref 0–0.85)
MONOCYTES NFR BLD AUTO: 9.5 % (ref 0–13.4)
NEUTROPHILS # BLD AUTO: 4.97 K/UL (ref 1.82–7.42)
NEUTROPHILS NFR BLD: 72.9 % (ref 44–72)
NRBC # BLD AUTO: 0 K/UL
NRBC BLD-RTO: 0 /100 WBC
PLATELET # BLD AUTO: 199 K/UL (ref 164–446)
PMV BLD AUTO: 9.7 FL (ref 9–12.9)
POTASSIUM SERPL-SCNC: 4.5 MMOL/L (ref 3.6–5.5)
RBC # BLD AUTO: 3.6 M/UL (ref 4.7–6.1)
SODIUM SERPL-SCNC: 129 MMOL/L (ref 135–145)
WBC # BLD AUTO: 6.8 K/UL (ref 4.8–10.8)

## 2019-12-22 PROCEDURE — A9270 NON-COVERED ITEM OR SERVICE: HCPCS | Performed by: NURSE PRACTITIONER

## 2019-12-22 PROCEDURE — 700102 HCHG RX REV CODE 250 W/ 637 OVERRIDE(OP): Performed by: NURSE PRACTITIONER

## 2019-12-22 PROCEDURE — 700105 HCHG RX REV CODE 258: Performed by: STUDENT IN AN ORGANIZED HEALTH CARE EDUCATION/TRAINING PROGRAM

## 2019-12-22 PROCEDURE — 700102 HCHG RX REV CODE 250 W/ 637 OVERRIDE(OP): Performed by: STUDENT IN AN ORGANIZED HEALTH CARE EDUCATION/TRAINING PROGRAM

## 2019-12-22 PROCEDURE — A9270 NON-COVERED ITEM OR SERVICE: HCPCS | Performed by: STUDENT IN AN ORGANIZED HEALTH CARE EDUCATION/TRAINING PROGRAM

## 2019-12-22 PROCEDURE — 36415 COLL VENOUS BLD VENIPUNCTURE: CPT

## 2019-12-22 PROCEDURE — 700102 HCHG RX REV CODE 250 W/ 637 OVERRIDE(OP): Performed by: INTERNAL MEDICINE

## 2019-12-22 PROCEDURE — 99232 SBSQ HOSP IP/OBS MODERATE 35: CPT | Performed by: NURSE PRACTITIONER

## 2019-12-22 PROCEDURE — 82962 GLUCOSE BLOOD TEST: CPT | Mod: 91

## 2019-12-22 PROCEDURE — 700105 HCHG RX REV CODE 258: Performed by: INTERNAL MEDICINE

## 2019-12-22 PROCEDURE — A9270 NON-COVERED ITEM OR SERVICE: HCPCS | Performed by: INTERNAL MEDICINE

## 2019-12-22 PROCEDURE — 85025 COMPLETE CBC W/AUTO DIFF WBC: CPT

## 2019-12-22 PROCEDURE — 80048 BASIC METABOLIC PNL TOTAL CA: CPT

## 2019-12-22 PROCEDURE — 700111 HCHG RX REV CODE 636 W/ 250 OVERRIDE (IP): Performed by: STUDENT IN AN ORGANIZED HEALTH CARE EDUCATION/TRAINING PROGRAM

## 2019-12-22 PROCEDURE — 99232 SBSQ HOSP IP/OBS MODERATE 35: CPT | Performed by: INTERNAL MEDICINE

## 2019-12-22 PROCEDURE — 97162 PT EVAL MOD COMPLEX 30 MIN: CPT

## 2019-12-22 PROCEDURE — 83605 ASSAY OF LACTIC ACID: CPT

## 2019-12-22 PROCEDURE — 770020 HCHG ROOM/CARE - TELE (206)

## 2019-12-22 PROCEDURE — 700111 HCHG RX REV CODE 636 W/ 250 OVERRIDE (IP): Performed by: INTERNAL MEDICINE

## 2019-12-22 RX ORDER — SODIUM CHLORIDE 9 MG/ML
250 INJECTION, SOLUTION INTRAVENOUS ONCE
Status: COMPLETED | OUTPATIENT
Start: 2019-12-22 | End: 2019-12-22

## 2019-12-22 RX ORDER — INSULIN GLARGINE 100 [IU]/ML
17 INJECTION, SOLUTION SUBCUTANEOUS EVERY EVENING
Status: DISCONTINUED | OUTPATIENT
Start: 2019-12-22 | End: 2019-12-27 | Stop reason: HOSPADM

## 2019-12-22 RX ORDER — CARVEDILOL 12.5 MG/1
12.5 TABLET ORAL 2 TIMES DAILY WITH MEALS
Status: DISCONTINUED | OUTPATIENT
Start: 2019-12-22 | End: 2019-12-25

## 2019-12-22 RX ADMIN — CARVEDILOL 12.5 MG: 12.5 TABLET, FILM COATED ORAL at 18:07

## 2019-12-22 RX ADMIN — AMPICILLIN SODIUM AND SULBACTAM SODIUM 3 G: 2; 1 INJECTION, POWDER, FOR SOLUTION INTRAMUSCULAR; INTRAVENOUS at 00:04

## 2019-12-22 RX ADMIN — SPIRONOLACTONE 25 MG: 25 TABLET ORAL at 05:55

## 2019-12-22 RX ADMIN — SODIUM CHLORIDE 250 ML: 9 INJECTION, SOLUTION INTRAVENOUS at 11:14

## 2019-12-22 RX ADMIN — VALSARTAN 80 MG: 80 TABLET, FILM COATED ORAL at 18:06

## 2019-12-22 RX ADMIN — INSULIN GLARGINE 17 UNITS: 100 INJECTION, SOLUTION SUBCUTANEOUS at 18:11

## 2019-12-22 RX ADMIN — LINEZOLID 600 MG: 600 TABLET, FILM COATED ORAL at 05:54

## 2019-12-22 RX ADMIN — HEPARIN SODIUM 5000 UNITS: 5000 INJECTION, SOLUTION INTRAVENOUS; SUBCUTANEOUS at 15:15

## 2019-12-22 RX ADMIN — HEPARIN SODIUM 5000 UNITS: 5000 INJECTION, SOLUTION INTRAVENOUS; SUBCUTANEOUS at 05:58

## 2019-12-22 RX ADMIN — ATORVASTATIN CALCIUM 80 MG: 80 TABLET, FILM COATED ORAL at 20:31

## 2019-12-22 RX ADMIN — CLOPIDOGREL BISULFATE 75 MG: 75 TABLET ORAL at 20:31

## 2019-12-22 RX ADMIN — CARVEDILOL 25 MG: 25 TABLET, FILM COATED ORAL at 05:54

## 2019-12-22 RX ADMIN — HEPARIN SODIUM 5000 UNITS: 5000 INJECTION, SOLUTION INTRAVENOUS; SUBCUTANEOUS at 20:31

## 2019-12-22 RX ADMIN — ASPIRIN 81 MG: 81 TABLET, COATED ORAL at 05:54

## 2019-12-22 RX ADMIN — AMPICILLIN SODIUM AND SULBACTAM SODIUM 3 G: 2; 1 INJECTION, POWDER, FOR SOLUTION INTRAMUSCULAR; INTRAVENOUS at 06:03

## 2019-12-22 ASSESSMENT — ENCOUNTER SYMPTOMS
CHILLS: 0
WHEEZING: 0
VOMITING: 0
NAUSEA: 0
STRIDOR: 0
COUGH: 0
SPUTUM PRODUCTION: 0
FEVER: 0
SHORTNESS OF BREATH: 0
APNEA: 0
CHOKING: 0
ABDOMINAL PAIN: 0
HEMOPTYSIS: 0
CHEST TIGHTNESS: 0

## 2019-12-22 ASSESSMENT — COGNITIVE AND FUNCTIONAL STATUS - GENERAL
CLIMB 3 TO 5 STEPS WITH RAILING: A LOT
WALKING IN HOSPITAL ROOM: A LITTLE
MOVING TO AND FROM BED TO CHAIR: A LITTLE
SUGGESTED CMS G CODE MODIFIER MOBILITY: CK
MOBILITY SCORE: 19
TURNING FROM BACK TO SIDE WHILE IN FLAT BAD: A LITTLE

## 2019-12-22 ASSESSMENT — GAIT ASSESSMENTS
ASSISTIVE DEVICE: FRONT WHEEL WALKER
GAIT LEVEL OF ASSIST: UNABLE TO PARTICIPATE

## 2019-12-22 NOTE — PROGRESS NOTES
"Received report from day shift RNAlthea. Assumed care of pt. Pt reports no pain at this time, however states he \"feels hot\". VS show oral temp of 100.5 and bp 91/53 w/ a MAP of 66 which appears to be slightly lower than his baseline of sbp in 110s. FSBS 188. [facility] resident on call paged to receive new orders. Updated pt on plan of care. Pt resting comfortably in bed. Wife at bedside. Educated on use of call light. Hourly rounding and continuous monitoring in place.    1957: Dr Elizabeth from [facility] returned page. MD to place new orders.   "

## 2019-12-22 NOTE — PROGRESS NOTES
Infectious Disease Progress Note    Author: Angie Gonzalez M.D. Date & Time of service: 2019  5:34 PM    Chief Complaint:  Left lower extremity gangrene, LLE ulceration, and cellulitis.       Interval History:  53 y/o diabetic male admitted with dyspnea. Found to have ischemic CM and above   AF WBC 7.3 planned for bypass-ortho surgery on hold. No current SOB or CP. Pain in LLE controlled. States while in [place] had fever, cough, SOB. Afebrile for one week prior to return home. Declined treatment for LLE while in [place].   AF WBC 6.6 no fever, chills-decreased swelling and erythema left foot-toes remain dry and gangrenous. +episodes CP and SOB   AF WBC 6.9 up to chair-tentative OR tomorrow-no new complaints   AF up to chair-plan for BKA today-no new complaints tolerating abx. Plan of care discussed   AF WBC 5.3 s/p BKA No new complaints  Labs Reviewed, Medications Reviewed,and Wound Reviewed.    Review of Systems:  Review of Systems   Constitutional: Negative for chills and fever.   Respiratory: Negative for cough, hemoptysis, sputum production and shortness of breath.    Cardiovascular: Negative for chest pain.   Musculoskeletal: Positive for joint pain.   Skin: Negative for itching and rash.   All other systems reviewed and are negative.      Hemodynamics:  Temp (24hrs), Av.3 °C (97.3 °F), Min:35.7 °C (96.3 °F), Max:37.1 °C (98.7 °F)  Temperature: 36.8 °C (98.2 °F)  Pulse  Av.5  Min: 70  Max: 107   Blood Pressure: 114/72       Physical Exam:  Physical Exam  Vitals signs and nursing note reviewed.   Eyes:      General:         Right eye: No discharge.   Cardiovascular:      Rate and Rhythm: Tachycardia present.   Pulmonary:      Effort: Pulmonary effort is normal. No respiratory distress.   Musculoskeletal:      Comments: LLE surgical dressing in place   Neurological:      Sensory: Sensory deficit present.         Meds:    Current Facility-Administered Medications:   •   oxyCODONE-acetaminophen  •  morphine injection  •  atorvastatin  •  heparin  •  clopidogrel  •  insulin glargine **AND** insulin lispro **AND** insulin lispro **AND** Accu-Chek ACHS **AND** SHAQ WEBER and PharmD **AND** glucose **AND** dextrose 10% bolus  •  ampicillin-sulbactam (UNASYN) IV  •  carvedilol  •  spironolactone  •  valsartan  •  senna-docusate **AND** polyethylene glycol/lytes **AND** magnesium hydroxide **AND** bisacodyl  •  Respiratory Care per Protocol  •  enalaprilat  •  cloNIDine  •  aspirin  •  linezolid    Labs:  Recent Labs     12/19/19  0203 12/20/19  0300 12/21/19  0318   WBC 6.9 6.4 5.3   RBC 3.30* 3.36* 3.47*   HEMOGLOBIN 8.9* 9.2* 9.5*   HEMATOCRIT 28.1* 28.3* 30.5*   MCV 85.2 84.2 87.9   MCH 27.0 27.4 27.4   RDW 50.5* 50.6* 53.5*   PLATELETCT 128* 129* 149*   MPV 10.7 10.6 10.2   NEUTSPOLYS 87.00* 77.90* 73.50*   LYMPHOCYTES 7.80* 15.00* 15.90*   MONOCYTES 5.20 6.20 7.90   EOSINOPHILS 0.00 0.90 1.90   BASOPHILS 0.00 0.00 0.40   RBCMORPHOLO Normal Present  --      Recent Labs     12/19/19  0203 12/20/19  0300 12/21/19  0318   SODIUM 129* 130* 130*   POTASSIUM 4.4 5.2 4.5   CHLORIDE 99 100 98   CO2 22 22 23   GLUCOSE 183* 251* 207*   BUN 19 22 17     Recent Labs     12/19/19  0203 12/20/19  0300 12/21/19  0318   CREATININE 0.73 0.88 0.65       Imaging:  Dx-chest-portable (1 View)    Result Date: 12/16/2019 12/16/2019 3:31 AM HISTORY/REASON FOR EXAM: Shortness of Breath TECHNIQUE/EXAM DESCRIPTION:  Single AP view of the chest. COMPARISON: None FINDINGS: Overlying cardiac leads are present. The cardiac silhouette appears within normal limits. The mediastinal contour appears within normal limits.  The central pulmonary vasculature appears normal. The lungs appear well expanded bilaterally.  Hazy and reticular bilateral lung base opacities are seen, greater on the right. Mild blunting of the right costophrenic angle is seen suggesting small right effusion. The bony structures appear  age-appropriate.     1.  Hazy and reticular bilateral lung base opacities, greater on the right, appearance suggests atelectasis and/or right lung base infiltrate. 2.  Trace right pleural effusion    Dx-foot-complete 3+ Left    Result Date: 12/16/2019 12/16/2019 4:07 AM HISTORY/REASON FOR EXAM: Atraumatic Pain/Swelling/Deformity; Gangrenous second and third toe, nonhealing ulcer to the medial surface of the foot.  Suspect osteomyelitis TECHNIQUE/EXAM DESCRIPTION:  AP, lateral, and oblique views of the LEFT foot. COMPARISON:  August 21, 2019 FINDINGS: There is third toe proximal phalanx neck fracture identified. There is fracture through the base of the second toe proximal phalanx. There is mild ostial lysis at the base of the second toe which is new since prior study. Osteolytic changes at the first metatarsophalangeal joint are seen, slightly more pronounced since prior study. New areas of osteolysis are seen involving the second, third, and fifth metatarsal heads. There are new lytic changes identified at the first tarsometatarsal joint. Atherosclerotic changes are noted. Soft tissue swelling of the forefoot is noted.     1.  Fracture at the neck of the third toe proximal phalanx 2.  Fracture through the base of the second toe proximal phalanx with new osteolysis, appears likely related to pathologic fracture from suspected osteomyelitis. 3.  Osteolytic at the first metatarsophalangeal joint, increased since prior study, appearance favors progressive osteomyelitis. 4.  New osteolytic changes at the first tarsometatarsal joint, appearance most compatible with osteomyelitis. 5.  New areas of osteolysis involving the second, third, and fifth metatarsal heads, concerning for osteomyelitis 6.  Atherosclerosis    Us-extremity Venous Lower Bilat    Result Date: 12/16/2019   Vascular Laboratory  CONCLUSIONS  Normal bilateral superficial and deep venous examination of the lower  extremities.  Prominent left inguinal lymph  nodes.  TRELL CASTLE  Exam Date:     2019 07:02  Room #:     Inpatient  Priority:     Routine  Ht (in):             Wt (lb):  Ordering Physician:        REBECCA REARDON  Referring Physician:       [id number] Bertrand  Sonographer:               Bonnie Bryant RVT  Study Type:                Complete Bilateral  Technical Quality:         Adequate  Age:    54    Gender:     M  MRN:    [id number]  :    1965      BSA:  Indications:     Localized swelling, mass and lump, unspecified lower limb  CPT Codes:       69141  ICD Codes:       R22.40  History:         Bilateral swelling  Limitations:  PROCEDURES:  Bilateral lower extremity venous duplex imaging.  The following venous structures were evaluated: common femoral, profunda  femoral, greater saphenous, femoral, popliteal , peroneal and posterior  tibial veins.  Serial compression, color and spectral Doppler flow evaluations were  performed.  FINDINGS:  Bilateral lower extremities -.  No deep venous thrombosis.  Complete color filling and compressibility with normal venous flow dynamics  including spontaneous flow, response to augmentation maneuvers, and  respiratory phasicity.  There are two enlarged lyph nodes seen in the left groin. The largest  measuring 2.0x0.92cm  Reinier Wray  (Electronically Signed)  Final Date:      2019                   08:34    Ct-cta Chest Pulmonary Artery W/ Recons    Result Date: 2019 9:34 AM HISTORY/REASON FOR EXAM:  Shortness of breath and tachycardia TECHNIQUE/EXAM DESCRIPTION: CT angiogram scan for pulmonary embolism with contrast, with reconstructions. 1.25 mm helical sections were obtained from the lung apices through the lung bases following the rapid bolus administration of 55 mL of Omnipaque 350 nonionic contrast. Thin-section overlapping reconstruction interval was utilized. Coronal reconstructions were generated from the axial data. MIP post  processing was performed and utilized for the interpretation. Low dose optimization technique was utilized for this CT exam including automated exposure control and adjustment of the mA and/or kV according to patient size. COMPARISON: None FINDINGS: There are patchy bilateral infiltrates. There are small bilateral pleural effusions. There are no pulmonary nodules seen. There is no evidence of mediastinal or hilar adenopathy. There is atherosclerotic vascular calcification. No large masses are seen within the upper abdomen. There is no evidence of filling defect within the pulmonary arterial system to suggest presence of pulmonary embolus.     1.  No evidence of pulmonary embolus. 2.  Patchy bilateral infiltrates and small bilateral pleural effusions. 3.  Atherosclerotic vascular calcification.    Ec-echocardiogram Complete W/ Cont    Result Date: 12/16/2019  Transthoracic Echo Report Echocardiography Laboratory CONCLUSIONS Severely reduced left ventricular systolic function. Left ventricular ejection fraction is visually estimated to be 30-35%. There appears to be a linear echodensity which is adjacent to the LV apex concerning for possible small LV thrombus. May consider repeat limted echo in 1-2 days for re-evaluation if clinically indicated.  Normal inferior vena cava size and inspiratory collapse. Indeterminate diastolic function. Aortic sclerosis without stenosis. Dr. Segovia is aware of the above findings. TRELL CASTLE Exam Date:          LV EF:        % FINDINGS Left Ventricle Normal left ventricular chamber size. Normal left ventricular wall thickness. Severely reduced left ventricular systolic function. Left ventricular ejection fraction is visually estimated to be 30-35%. Global hypokinesis with apical dyskinesis. Basal septum appears to have normal contractility.  Indeterminate diastolic function.  Contrast was used to evaluate for thrombus in the left ventricular apex. There appears to be a linear  echodensity which is adjacent to the LV apex. LV thrombus cannot be ruled out. Consider repeat limted echo in 1-2 days for re-evaluation if clinically indicated.  Spontaneous contrast is noted. 3 ML of contrast was administered. Existing IV was used. Right Ventricle Normal right ventricular size and systolic function. Right Atrium Normal right atrial size. Normal inferior vena cava size and inspiratory collapse. Left Atrium Mildly dilated left atrium. Left atrial volume index is 35 mL/sq m. Mitral Valve Mitral annular calcification. No mitral stenosis. Mild mitral regurgitation. Aortic Valve Tricuspid aortic valve. Aortic sclerosis without stenosis. No aortic insufficiency. Tricuspid Valve Structurally normal tricuspid valve. No tricuspid stenosis. Trace tricuspid regurgitation. Unable to estimate pulmonary artery pressure due to an inadequate tricuspid regurgitant jet. Pulmonic Valve The pulmonic valve is not well visualized.  No pulmonic stenosis. Trace pulmonic insufficiency. Pericardium No pericardial effusion seen. Aorta Normal aortic root for body surface area. Ascending aorta diameter is 3.3 cm. Caitlin Acosta MD (Electronically Signed) Final Date:     16 December 2019                 12:09    Cl-left Heart Catheterization With Possible Intervention  Result Date: 12/16/2019  Conclusions   1. Severe LV systolic dysfunction by noninvasive evaluation.   2. Severe and diffuse obstructive three-vessel coronary artery disease.   3. Normal LVEDP. Post-Procedure Diagnosis   Ischemic cardiomyopathy. Recommendations   * Medical therapy of coronary artery disease and ischemic cardiomyopathy. Consider nonurgent and staged revascularization, preferably bypass surgery.   * Would be reasonable to proceed with planned amputation of the foot prior to coronary artery revascularization.  If this path is elected it would be at increased risk of perioperative cardiovascular complications and I would recommend perioperative  antiplatelet medication statin therapy beta-blockade and close attention to avoid hemodynamic stressors for excess blood loss/fluid shifts.   Never Coronary Diagnostic Findings   * Left main: Distal tapering with 50% stenosis.   * Left anterior descending: Diffuse atherosclerosis and negative remodeling beginning at the origin.  There are sequential 70% stenosis in the proximal and mid segment of the vessel.  There is subtotal occlusion in the mid to distal segment with AGATHA II flow into the apical LAD. The first diagonal has proximal 70% stenosis and is diffusely diseased and negatively remodeled with 95% stenosis in the lower branch.   * Left circumflex: Mid vessel 70-80% stenosis.  The OM1 has diffuse atherosclerosis there is less than 1.5 mm in size and without significant narrowing.  The OM 2 bifurcates proximally the upper branch has proximal 50% stenosis and is a less than 1.5 mm vessel.  The lower branch is a 2.25 mm vessel and has proximal 85% stenosis.   * Right coronary artery: Dominant, diffuse 60% stenosis in the proximal mid and distal AV groove.  There is a dual PDA.  The first PDA has a mid 80% stenosis and diffuse disease downstream.  The second PDA has proximal 80% stenosis. Cath Hemodynamic Data Pressures     Phase:Baseline     AO (sys/bauman/mean) :  99 mmHg / 61 mmHg ( 73 mmHg )  @ 2:02:13 PM                           90 mmHg / 57 mmHg ( 69 mmHg )  @ 2:06:18 PM                           92 mmHg / 56 mmHg ( 73 mmHg )  @ 2:06:41 PM     LV (sys/bauman/EDP) :  91 mmHg / 8 mmHg / 12 mmHg  @ 2:06:10 PM                          89 mmHg / 8 mmHg / 12 mmHg  @ 2:06:13 PM     AO (sys/bauman/mean) HR:  91 bpm  @ 2:02:13 PM                             96 bpm  @ 2:06:18 PM                             96 bpm  @ 2:06:41 PM     LV (sys/bauman/EDP) HR:  96 bpm  @ 2:06:10 PM                            95 bpm  @ 2:06:13 PM     LV (sys/bauman/EDP) DP/DT:  816 mmHg/s  @ 2:06:10 PM                               816 mmHg/s  @  2:06:13 PM Access Closure   * Sheath Pulled and Post Procedure Forms (Pulses, etc.) Closure Device Deployed. Procedural Details   Correct Patient established (2 Patient Identifiers).   Correct Procedure verified with Patient.   Informed Consent Signed By MD And On Chart.   The risks and benefits of cardiac catheterization as well as the procedure itself, rationale and appropriateness were discussed with the patient. Complications including but not limited to death, stroke, MI, urgent bypass surgery, contrast nephropathy, vascular complications, bleeding and infection were explained to the patient.   Pre-procedure Teaching Performed.   Patient Verbalized Understanding of Procedure.   Updated and Current H & P On The Chart.   The insertion site was disinfected with a Chlorhexidine-based preparation. The antiseptic remained on the insertion site and was allowed to air dry for at least two (2) minutes. The patient was covered with a large sterile drape, with a small opening corresponding to the insertion site. The sterile drape covered the patient from head to toe.   Pre-Anesthesia Assessment & Sedation Plan Completed.   Percutaneous Puncture to right radial artery.   6FR 6FR Slender SWS Glidesheath Sheath Inserted Into right radial artery.   5fr/100cm TIG 4.0 100cm Catheter Inserted by Guidewire.   150cm .035 J Guidewire used.   Left Coronary System Injected & Multi. Views Taken.   Right Coronary System Injected & Multi. Views Taken.   Pullback LV to AO.   Catheter Removed.   No Complications.   Sheath Pulled and Post Procedure Forms (Pulses, etc.) Closure Device Deployed.   Patent Hemostasis Confirmed/Reverse Barbeau Test Done by: MAKAYLA.   Post Procedure Teaching Performed.   Patient Verbalizes Understanding of Teaching. Access Site   * Percutaneous Puncture to right radial artery.   * 6FR 6FR Slender SWS Glidesheath Sheath Inserted Into right radial artery. Sedation Start Time:     13:52 Sedation Stop Time:     14:07  Sedation Total Time:     16 min Case Start Time:     13:59 Procedure(s) Performed   * Hemostasis with Radial Compression Device.   * Art Access - R radial artery.   * Coronary Angio.   * Left Heart Cath. Procedure Medications ------------------------------ Start:     1:52 PM Medication:     Versed Amount:     1 mg Route:     IV Administered By:     Gris Rodriguez Ordered By:     Rosita Liao ------------------------------ Start:     1:52 PM Medication:     Fentanyl Amount:     50 mcg Route:     IV Administered By:     Gris Rodriguez Ordered By:     Rosita Liao ------------------------------ Start:     2:00 PM Medication:     2% Lidocaine Amount:     2 ml Route:     Subcut Administered By:     Rosita Liao Ordered By:     Rosita Liao ------------------------------ Start:     2:01 PM Medication:     Heparin Amount:     5000 units Route:     IA Administered By:     Rosita Liao Ordered By:     Rosita Liao ------------------------------ Start:     2:01 PM Medication:     Nitroglycerin Amount:     100 mcg Route:     IA Administered By:     Rosita Liao Ordered By:     Rosita Liao ------------------------------ Start:     2:01 PM Medication:     Verapamil Amount:     2.5 mg Route:     IA Administered By:     Rosita Liao Ordered By:     Rosita Liao ------------------------------ Start:     2:03 PM Medication:     Nitroglycerin Amount:     240 mcg Route:     IC Administered By:     Rosita Liao Ordered By:     Rosita Liao X-ray Summary ------------------------------ Total Time (min):     1.40 Total Dose (mGy):     293 DAP (cGy*cm):     --- Contrast ------------------------------ Contrast:     Omnipaque 350 Amount (mL):     25 ml Report Signatures Finalized by Keshawn Becker MD on 12/16/2019 02:27 PM      Micro:  Results     Procedure Component Value Units Date/Time    Anaerobic Culture [[id number]] Collected:  12/20/19 1512    Order Status:  Completed Specimen:  Wound Updated:  12/21/19 1517     " Significant Indicator NEG     Source WND     Site Left leg     Culture Result Culture in progress.    Narrative:       Surgery - swabs received    CULTURE WOUND W/ GRAM STAIN [[id number]] Collected:  12/20/19 1512    Order Status:  Completed Specimen:  Wound Updated:  12/21/19 1517     Significant Indicator NEG     Source WND     Site Left leg     Culture Result No growth at 24 hours.     Gram Stain Result No organisms seen.    Narrative:       Surgery - swabs received    GRAM STAIN [[id number]] Collected:  12/20/19 1512    Order Status:  Completed Specimen:  Wound Updated:  12/21/19 0904     Significant Indicator .     Source WND     Site Left leg     Gram Stain Result No organisms seen.    Narrative:       Surgery - swabs received    BLOOD CULTURE [[id number]] Collected:  12/16/19 0432    Order Status:  Completed Specimen:  Blood from Peripheral Updated:  12/21/19 0700     Significant Indicator NEG     Source BLD     Site PERIPHERAL     Culture Result No growth after 5 days of incubation.    Narrative:       Per Hospital Policy: Only change Specimen Src: to \"Line\" if  specified by physician order.  Right Hand    BLOOD CULTURE [[id number]] Collected:  12/16/19 0450    Order Status:  Completed Specimen:  Blood from Peripheral Updated:  12/21/19 0700     Significant Indicator NEG     Source BLD     Site PERIPHERAL     Culture Result No growth after 5 days of incubation.    Narrative:       Per Hospital Policy: Only change Specimen Src: to \"Line\" if  specified by physician order.  No site indicated    URINALYSIS CULTURE, IF INDICATED [[id number]]  (Abnormal) Collected:  12/16/19 0541    Order Status:  Completed Specimen:  Urine Updated:  12/16/19 0555     Color Yellow     Character Clear     Specific Gravity 1.019     Ph 7.0     Glucose >=1000 mg/dL      Ketones Negative mg/dL      Protein 30 mg/dL      Bilirubin Negative     Urobilinogen, Urine 0.2     Nitrite Negative     Leukocyte Esterase Negative     Occult Blood " Negative     Micro Urine Req Microscopic          Assessment:  Left lower extremity gangrene, LLE ulceration, and cellulitis  OM left foot, multifocal.   Ischemic cardiomyopathy  Diabetes  Carbapenemase +in stool  PVD    Plan:  Left lower extremity gangrene, dry  LLE dorsal foot ulceration  Cellulitis, improved  Afebrile  No current leukcytosis  Blood cxs neg  DW Ortho: Had been seen at [facility] and declined amp toes/ BKA prior to trip to [facility]  Per patient while in [place] did not seek any treatment for LLE-told doctors there just to dress it  Imaging +multifocal OM  S/p BKA 12/20  Continue Zyvox and Unasyn for now  Anticipate DC abx tomorrow if no new positive op cxs as surgery is definitive treatment for his foot infections    CAD  S/p cardiac cath  Plan for CABG  Ischemic CM with EF 15%  CXR pulm edema by my read    Diabetes.   Keep BS under 150 to help control current infection  -245    PVD  WIll impair abx efficacy and wound care  Vascular eval appreciated-no intervention planned    Stool PCR +carbapenemase  Contact isolation  Escalate abx if worsening infection

## 2019-12-22 NOTE — PROGRESS NOTES
[facility] PROGRESS NOTE     Attending: Dr. Grace WEBER  Resident: Teresa Canas ( PGY-1)  PATIENT: Israel Aviles; [id number]; 1965    ID: 54 y.o. male admitted for left lower extremity gangrene, LLE foot ulceration, subsequently found to have diffuse CAD by [facility], new heart failure.    SUBJECTIVE: Patient with a oral temp reading of 100.6 F last night. Resolved with tylenol. BPs soft at this time, systolic ranging 93-96 diastolic ranging 53-61. Lactic acid taken, normal at 1.5. BCxs x2 drawn.     This AM patient feeling well. In good spirits. No pain. No c/o fevers, chills, chest pain, SOB    OBJECTIVE:     Vitals:    12/21/19 2148 12/21/19 2149 12/21/19 2359 12/22/19 0411   BP: (!) 93/58  (!) 89/54 111/67   Pulse:   84 90   Resp:   16 16   Temp: 36.2 °C (97.2 °F) 37.9 °C (100.3 °F) 36.6 °C (97.9 °F) 36.6 °C (97.9 °F)   TempSrc: Axillary Oral Oral Temporal   SpO2:   92% 95%   Weight:       Height:           Intake/Output Summary (Last 24 hours) at 12/22/2019 0720  Last data filed at 12/22/2019 0557  Gross per 24 hour   Intake 240 ml   Output 910 ml   Net -670 ml       PE:  General: No acute distress, resting comfortably in bed.  HEENT: NC/AT. PERRLA. EOMI. MMM  Cardiovascular: RRR with no M/R/G.  Respiratory: Symmetrical chest. CTAB with no adventitious breath sounds   Abdomen: soft, NT/ND, no masses, +BS   EXT:  L BKA   Neuro: AAO x 3    LABS:  Recent Labs     12/20/19  0300 12/21/19  0318 12/22/19  0547   WBC 6.4 5.3 6.8   RBC 3.36* 3.47* 3.60*   HEMOGLOBIN 9.2* 9.5* 9.6*   HEMATOCRIT 28.3* 30.5* 31.2*   MCV 84.2 87.9 86.7   MCH 27.4 27.4 26.7*   RDW 50.6* 53.5* 52.3*   PLATELETCT 129* 149* 199   MPV 10.6 10.2 9.7   NEUTSPOLYS 77.90* 73.50* 72.90*   LYMPHOCYTES 15.00* 15.90* 16.30*   MONOCYTES 6.20 7.90 9.50   EOSINOPHILS 0.90 1.90 0.70   BASOPHILS 0.00 0.40 0.30   RBCMORPHOLO Present  --   --      Recent Labs     12/20/19  0300 12/21/19  0318 12/22/19  0547   SODIUM 130* 130* 129*   POTASSIUM 5.2  4.5 4.5   CHLORIDE 100 98 96   CO2 22 23 26   BUN 22 17 17   CREATININE 0.88 0.65 0.73   CALCIUM 7.7* 8.0* 7.6*   MAGNESIUM 1.9  --   --      Estimated GFR/CRCL = Estimated Creatinine Clearance: 127 mL/min (by C-G formula based on SCr of 0.73 mg/dL).  Recent Labs     12/20/19  0300  12/21/19  0318  12/21/19  1109 12/21/19  1709 12/21/19  1940 12/22/19  0547   GLUCOSE 251*  --  207*  --   --   --   --  175*   POCGLUCOSE  --    < >  --    < > 245* 215* 188*  --     < > = values in this interval not displayed.                 No results for input(s): INR, APTT, FIBRINOGEN in the last 72 hours.    Invalid input(s): DIMER    MICROBIOLOGY:   BCx NGTD from 12/16  WND cx NGTD 12/16    IMAGING: reviewed    MEDS:  Current medications reviewed     ASSESSMENT/PLAN:   54 y.o. male admitted for left lower extremity gangrene, LLE foot ulceration, subsequently found to have diffuse CAD by [facility], new heart failure.     # LLE gangrene, dry -s/p BKA  # LLE doral foot ulceration - s/p BKA  # Osteomyelitis - s/p BKA  # Cellulitis -s/p BKA  - 6/19 underwent left dorsalus pedis angioplasty, anterior tibial angio and atherectomy, left posterior tibial A angio   - 8/19: foot infection Cx + MSSA and e faecalis tx 6 weeks daptomycin,  - Recent trip to [facility] received linezolid and imipenem   - Surgery postponed 2/2 to NSTEMI/diffuse CAD and new HF, per cards medical optimization for ~ 1 week  - Stool PCR + carbapenmase  -12/20 patient underwent Left BKA   - ID, Cards, wound care following: appreciate recommendations       Plan:  Antibiotics DC today per ID  Follow up ortho recommendations post op  Wound care per team   Contact precautions   PT/OT consult needed      # NSTEMI   # Severe/diffuse 3 vessel CAD   On admission SOB elevated trop 64 and EKG with TWI, Echo demonstrating new heart failure, underwent [facility] 12/16 show duffuse/severe disease in LAD, LCx, and RCA.  No intervention was preformed.   Cardiology following   Per cardiothoracic  surgery patient not candidate for CABG, recommend cardiac medical optimization     Plan:   Telemetry   Continue Coreg, valsartan ( held this AM for soft BP), spirolactone, asa, Plavix, atorvastatin   Appreciate cardiology recs   Viability study to reassess for revascularization will be attempted as an O/P, per cards     #Heart Failure with reduced EF  - Likely 2/2 to diffuse CAD, PE work up negative  - ECHO on 12/16 showing new severely reduced LVF of 30-35%, BNP 6000 on admission, Repeat echo 12/18  EF 45%  - Cardiology following      Plan:   Coreg 6.25 mg BID, titrate as tolerated   Valsartan 80 mg BID, titrate as tolerated   Spironolactone 25 mg today  ASA, plavix, atorvastatin    Enrolled in HF program      # Possible LV Thrombus   Echo 12/16: demonstrating possible thrombus, heparin drip started 12/16, repeat echo 12/18 no evidence of thrombus.  Heparin d/c      # Anemia:  Unclear why hgb continues to drop pt was on heparin drip but no evidence of HIT, denies melana, is not on fluids so unlikely dilutional. Mostly likely anemia of chronic disease.  Per chart review appears baseline is 10-11.    hgb down trending from 11.3-> 9.9->8.9. Improved today at 9.6 s/p surgery day 2        #Type 2 Diabetes  - A1C 8.5  - Hold home  metformin and glipizide  - Lantus 15 u QHS, Low dose Correctional, Diabetic diet   - ADD nutritional coverage, 5 units AC  - Goal BS <150 for infection control     Plan:  Will increase Lantus to 18 u overnight for tighter BS control     #Hyponatremia  - Correct Na of 129 on admission, currently 130  CTM      #Constipation   Bowel protocol PRN     Core Measures   VTE PPx: heparin ppx  Abx: Unasyn, zyvox  Diet: Diabetic  Fluids: No IVFs  Lines and Tube: PIV   Code Status: Full code

## 2019-12-22 NOTE — PROGRESS NOTES
Dr Elizabeth updated on pt w/ low bp and despite having 250 cc bolus. Pt asymptomatic, does not complain of feeling hot anymore. Dr Elizabeth ordered repeat blood cultures and lactic. Will continue to monitor.

## 2019-12-22 NOTE — PROGRESS NOTES
Has some pain but managing with minimal pain meds  Dressing dry  AVSS  Mobilize  ABX  Dressing change tomorrow

## 2019-12-22 NOTE — PROGRESS NOTES
Infectious Disease Progress Note    Author: Angie Gonzalez M.D. Date & Time of service: 2019  3:14 PM    Chief Complaint:  Left lower extremity gangrene, LLE ulceration, and cellulitis.       Interval History:  53 y/o diabetic male admitted with dyspnea. Found to have ischemic CM and above   AF WBC 7.3 planned for bypass-ortho surgery on hold. No current SOB or CP. Pain in LLE controlled. States while in [place] had fever, cough, SOB. Afebrile for one week prior to return home. Declined treatment for LLE while in [place].   AF WBC 6.6 no fever, chills-decreased swelling and erythema left foot-toes remain dry and gangrenous. +episodes CP and SOB   AF WBC 6.9 up to chair-tentative OR tomorrow-no new complaints   AF up to chair-plan for BKA today-no new complaints tolerating abx. Plan of care discussed   AF WBC 5.3 s/p BKA No new complaints   AF WBC 6.8 up to chair getting a shave-no new complaints  Labs Reviewed, Medications Reviewed,and Wound Reviewed.    Review of Systems:  Review of Systems   Constitutional: Negative for chills and fever.   Respiratory: Negative for cough, hemoptysis, sputum production and shortness of breath.    Cardiovascular: Negative for chest pain.   Gastrointestinal: Negative for abdominal pain, nausea and vomiting.   Musculoskeletal: Positive for joint pain.   Skin: Negative for itching and rash.   All other systems reviewed and are negative.      Hemodynamics:  Temp (24hrs), Av.9 °C (98.5 °F), Min:36.2 °C (97.2 °F), Max:38.1 °C (100.6 °F)  Temperature: 36.2 °C (97.2 °F)  Pulse  Av.2  Min: 70  Max: 107   Blood Pressure: 107/61       Physical Exam:  Physical Exam  Vitals signs and nursing note reviewed.   Constitutional:       General: He is not in acute distress.     Appearance: He is not ill-appearing, toxic-appearing or diaphoretic.   HENT:      Nose: No congestion or rhinorrhea.      Mouth/Throat:      Pharynx: No oropharyngeal exudate.    Eyes:      General: No scleral icterus.     Extraocular Movements: Extraocular movements intact.      Pupils: Pupils are equal, round, and reactive to light.   Cardiovascular:      Rate and Rhythm: Tachycardia present.   Pulmonary:      Effort: Pulmonary effort is normal. No respiratory distress.   Abdominal:      Palpations: Abdomen is soft.   Musculoskeletal:      Comments: LLE surgical dressing in place   Skin:     General: Skin is warm.      Coloration: Skin is not jaundiced.   Neurological:      General: No focal deficit present.      Mental Status: He is alert and oriented to person, place, and time.      Sensory: Sensory deficit present.   Psychiatric:         Mood and Affect: Mood normal.         Behavior: Behavior normal.         Meds:    Current Facility-Administered Medications:   •  carvedilol  •  insulin glargine **AND** insulin lispro **AND** insulin lispro **AND** Accu-Chek ACHS **AND** NOTIFY MD and PharmD **AND** glucose **AND** dextrose 10% bolus  •  oxyCODONE-acetaminophen  •  morphine injection  •  atorvastatin  •  heparin  •  clopidogrel  •  acetaminophen  •  spironolactone  •  valsartan  •  senna-docusate **AND** polyethylene glycol/lytes **AND** magnesium hydroxide **AND** bisacodyl  •  Respiratory Care per Protocol  •  enalaprilat  •  cloNIDine  •  aspirin    Labs:  Recent Labs     12/20/19 0300 12/21/19 0318 12/22/19  0547   WBC 6.4 5.3 6.8   RBC 3.36* 3.47* 3.60*   HEMOGLOBIN 9.2* 9.5* 9.6*   HEMATOCRIT 28.3* 30.5* 31.2*   MCV 84.2 87.9 86.7   MCH 27.4 27.4 26.7*   RDW 50.6* 53.5* 52.3*   PLATELETCT 129* 149* 199   MPV 10.6 10.2 9.7   NEUTSPOLYS 77.90* 73.50* 72.90*   LYMPHOCYTES 15.00* 15.90* 16.30*   MONOCYTES 6.20 7.90 9.50   EOSINOPHILS 0.90 1.90 0.70   BASOPHILS 0.00 0.40 0.30   RBCMORPHOLO Present  --   --      Recent Labs     12/20/19  0300 12/21/19 0318 12/22/19  0547   SODIUM 130* 130* 129*   POTASSIUM 5.2 4.5 4.5   CHLORIDE 100 98 96   CO2 22 23 26   GLUCOSE 251* 207* 175*    BUN 22 17 17     Recent Labs     12/20/19  0300 12/21/19  0318 12/22/19  0547   CREATININE 0.88 0.65 0.73       Imaging:  Dx-chest-portable (1 View)    Result Date: 12/16/2019 12/16/2019 3:31 AM HISTORY/REASON FOR EXAM: Shortness of Breath TECHNIQUE/EXAM DESCRIPTION:  Single AP view of the chest. COMPARISON: None FINDINGS: Overlying cardiac leads are present. The cardiac silhouette appears within normal limits. The mediastinal contour appears within normal limits.  The central pulmonary vasculature appears normal. The lungs appear well expanded bilaterally.  Hazy and reticular bilateral lung base opacities are seen, greater on the right. Mild blunting of the right costophrenic angle is seen suggesting small right effusion. The bony structures appear age-appropriate.     1.  Hazy and reticular bilateral lung base opacities, greater on the right, appearance suggests atelectasis and/or right lung base infiltrate. 2.  Trace right pleural effusion    Dx-foot-complete 3+ Left    Result Date: 12/16/2019 12/16/2019 4:07 AM HISTORY/REASON FOR EXAM: Atraumatic Pain/Swelling/Deformity; Gangrenous second and third toe, nonhealing ulcer to the medial surface of the foot.  Suspect osteomyelitis TECHNIQUE/EXAM DESCRIPTION:  AP, lateral, and oblique views of the LEFT foot. COMPARISON:  August 21, 2019 FINDINGS: There is third toe proximal phalanx neck fracture identified. There is fracture through the base of the second toe proximal phalanx. There is mild ostial lysis at the base of the second toe which is new since prior study. Osteolytic changes at the first metatarsophalangeal joint are seen, slightly more pronounced since prior study. New areas of osteolysis are seen involving the second, third, and fifth metatarsal heads. There are new lytic changes identified at the first tarsometatarsal joint. Atherosclerotic changes are noted. Soft tissue swelling of the forefoot is noted.     1.  Fracture at the neck of the third toe  proximal phalanx 2.  Fracture through the base of the second toe proximal phalanx with new osteolysis, appears likely related to pathologic fracture from suspected osteomyelitis. 3.  Osteolytic at the first metatarsophalangeal joint, increased since prior study, appearance favors progressive osteomyelitis. 4.  New osteolytic changes at the first tarsometatarsal joint, appearance most compatible with osteomyelitis. 5.  New areas of osteolysis involving the second, third, and fifth metatarsal heads, concerning for osteomyelitis 6.  Atherosclerosis    Us-extremity Venous Lower Bilat    Result Date: 2019   Vascular Laboratory  CONCLUSIONS  Normal bilateral superficial and deep venous examination of the lower  extremities.  Prominent left inguinal lymph nodes.  TRELL CAST[facility]  Exam Date:     2019 07:02  Room #:     Inpatient  Priority:     Routine  Ht (in):             Wt (lb):  Ordering Physician:        REBECCA REARDON  Referring Physician:       [id number] Cruz  Sonographer:               Bonnie Bryant RVT  Study Type:                Complete Bilateral  Technical Quality:         Adequate  Age:    54    Gender:     M  MRN:    [id number]  :    1965      BSA:  Indications:     Localized swelling, mass and lump, unspecified lower limb  CPT Codes:       46890  ICD Codes:       R22.40  History:         Bilateral swelling  Limitations:  PROCEDURES:  Bilateral lower extremity venous duplex imaging.  The following venous structures were evaluated: common femoral, profunda  femoral, greater saphenous, femoral, popliteal , peroneal and posterior  tibial veins.  Serial compression, color and spectral Doppler flow evaluations were  performed.  FINDINGS:  Bilateral lower extremities -.  No deep venous thrombosis.  Complete color filling and compressibility with normal venous flow dynamics  including spontaneous flow, response to augmentation maneuvers, and  respiratory  phasicity.  There are two enlarged lyph nodes seen in the left groin. The largest  measuring 2.0x0.92cm  Reinier Wray  (Electronically Signed)  Final Date:      16 December 2019                   08:34    Ct-cta Chest Pulmonary Artery W/ Recons    Result Date: 12/16/2019 12/16/2019 9:34 AM HISTORY/REASON FOR EXAM:  Shortness of breath and tachycardia TECHNIQUE/EXAM DESCRIPTION: CT angiogram scan for pulmonary embolism with contrast, with reconstructions. 1.25 mm helical sections were obtained from the lung apices through the lung bases following the rapid bolus administration of 55 mL of Omnipaque 350 nonionic contrast. Thin-section overlapping reconstruction interval was utilized. Coronal reconstructions were generated from the axial data. MIP post processing was performed and utilized for the interpretation. Low dose optimization technique was utilized for this CT exam including automated exposure control and adjustment of the mA and/or kV according to patient size. COMPARISON: None FINDINGS: There are patchy bilateral infiltrates. There are small bilateral pleural effusions. There are no pulmonary nodules seen. There is no evidence of mediastinal or hilar adenopathy. There is atherosclerotic vascular calcification. No large masses are seen within the upper abdomen. There is no evidence of filling defect within the pulmonary arterial system to suggest presence of pulmonary embolus.     1.  No evidence of pulmonary embolus. 2.  Patchy bilateral infiltrates and small bilateral pleural effusions. 3.  Atherosclerotic vascular calcification.    Ec-echocardiogram Complete W/ Cont    Result Date: 12/16/2019  Transthoracic Echo Report Echocardiography Laboratory CONCLUSIONS Severely reduced left ventricular systolic function. Left ventricular ejection fraction is visually estimated to be 30-35%. There appears to be a linear echodensity which is adjacent to the LV apex concerning for possible small LV thrombus. May  consider repeat limted echo in 1-2 days for re-evaluation if clinically indicated.  Normal inferior vena cava size and inspiratory collapse. Indeterminate diastolic function. Aortic sclerosis without stenosis. Dr. Segovia is aware of the above findings. TRELL CASTLE Exam Date:          LV EF:        % FINDINGS Left Ventricle Normal left ventricular chamber size. Normal left ventricular wall thickness. Severely reduced left ventricular systolic function. Left ventricular ejection fraction is visually estimated to be 30-35%. Global hypokinesis with apical dyskinesis. Basal septum appears to have normal contractility.  Indeterminate diastolic function.  Contrast was used to evaluate for thrombus in the left ventricular apex. There appears to be a linear echodensity which is adjacent to the LV apex. LV thrombus cannot be ruled out. Consider repeat limted echo in 1-2 days for re-evaluation if clinically indicated.  Spontaneous contrast is noted. 3 ML of contrast was administered. Existing IV was used. Right Ventricle Normal right ventricular size and systolic function. Right Atrium Normal right atrial size. Normal inferior vena cava size and inspiratory collapse. Left Atrium Mildly dilated left atrium. Left atrial volume index is 35 mL/sq m. Mitral Valve Mitral annular calcification. No mitral stenosis. Mild mitral regurgitation. Aortic Valve Tricuspid aortic valve. Aortic sclerosis without stenosis. No aortic insufficiency. Tricuspid Valve Structurally normal tricuspid valve. No tricuspid stenosis. Trace tricuspid regurgitation. Unable to estimate pulmonary artery pressure due to an inadequate tricuspid regurgitant jet. Pulmonic Valve The pulmonic valve is not well visualized.  No pulmonic stenosis. Trace pulmonic insufficiency. Pericardium No pericardial effusion seen. Aorta Normal aortic root for body surface area. Ascending aorta diameter is 3.3 cm. Caitlin Acosta MD (Electronically Signed) Final Date:     16  December 2019                 12:09    Cl-left Heart Catheterization With Possible Intervention  Result Date: 12/16/2019  Conclusions   1. Severe LV systolic dysfunction by noninvasive evaluation.   2. Severe and diffuse obstructive three-vessel coronary artery disease.   3. Normal LVEDP. Post-Procedure Diagnosis   Ischemic cardiomyopathy. Recommendations   * Medical therapy of coronary artery disease and ischemic cardiomyopathy. Consider nonurgent and staged revascularization, preferably bypass surgery.   * Would be reasonable to proceed with planned amputation of the foot prior to coronary artery revascularization.  If this path is elected it would be at increased risk of perioperative cardiovascular complications and I would recommend perioperative antiplatelet medication statin therapy beta-blockade and close attention to avoid hemodynamic stressors for excess blood loss/fluid shifts.   Never Coronary Diagnostic Findings   * Left main: Distal tapering with 50% stenosis.   * Left anterior descending: Diffuse atherosclerosis and negative remodeling beginning at the origin.  There are sequential 70% stenosis in the proximal and mid segment of the vessel.  There is subtotal occlusion in the mid to distal segment with AGATHA II flow into the apical LAD. The first diagonal has proximal 70% stenosis and is diffusely diseased and negatively remodeled with 95% stenosis in the lower branch.   * Left circumflex: Mid vessel 70-80% stenosis.  The OM1 has diffuse atherosclerosis there is less than 1.5 mm in size and without significant narrowing.  The OM 2 bifurcates proximally the upper branch has proximal 50% stenosis and is a less than 1.5 mm vessel.  The lower branch is a 2.25 mm vessel and has proximal 85% stenosis.   * Right coronary artery: Dominant, diffuse 60% stenosis in the proximal mid and distal AV groove.  There is a dual PDA.  The first PDA has a mid 80% stenosis and diffuse disease downstream.  The second PDA  has proximal 80% stenosis. Cath Hemodynamic Data Pressures     Phase:Baseline     AO (sys/bauman/mean) :  99 mmHg / 61 mmHg ( 73 mmHg )  @ 2:02:13 PM                           90 mmHg / 57 mmHg ( 69 mmHg )  @ 2:06:18 PM                           92 mmHg / 56 mmHg ( 73 mmHg )  @ 2:06:41 PM     LV (sys/bauman/EDP) :  91 mmHg / 8 mmHg / 12 mmHg  @ 2:06:10 PM                          89 mmHg / 8 mmHg / 12 mmHg  @ 2:06:13 PM     AO (sys/bauman/mean) HR:  91 bpm  @ 2:02:13 PM                             96 bpm  @ 2:06:18 PM                             96 bpm  @ 2:06:41 PM     LV (sys/bauman/EDP) HR:  96 bpm  @ 2:06:10 PM                            95 bpm  @ 2:06:13 PM     LV (sys/bauman/EDP) DP/DT:  816 mmHg/s  @ 2:06:10 PM                               816 mmHg/s  @ 2:06:13 PM Access Closure   * Sheath Pulled and Post Procedure Forms (Pulses, etc.) Closure Device Deployed. Procedural Details   Correct Patient established (2 Patient Identifiers).   Correct Procedure verified with Patient.   Informed Consent Signed By MD And On Chart.   The risks and benefits of cardiac catheterization as well as the procedure itself, rationale and appropriateness were discussed with the patient. Complications including but not limited to death, stroke, MI, urgent bypass surgery, contrast nephropathy, vascular complications, bleeding and infection were explained to the patient.   Pre-procedure Teaching Performed.   Patient Verbalized Understanding of Procedure.   Updated and Current H & P On The Chart.   The insertion site was disinfected with a Chlorhexidine-based preparation. The antiseptic remained on the insertion site and was allowed to air dry for at least two (2) minutes. The patient was covered with a large sterile drape, with a small opening corresponding to the insertion site. The sterile drape covered the patient from head to toe.   Pre-Anesthesia Assessment & Sedation Plan Completed.   Percutaneous Puncture to right radial artery.   6FR  6FR Slender SWS Glidesheath Sheath Inserted Into right radial artery.   5fr/100cm TIG 4.0 100cm Catheter Inserted by Guidewire.   150cm .035 J Guidewire used.   Left Coronary System Injected & Multi. Views Taken.   Right Coronary System Injected & Multi. Views Taken.   Pullback LV to AO.   Catheter Removed.   No Complications.   Sheath Pulled and Post Procedure Forms (Pulses, etc.) Closure Device Deployed.   Patent Hemostasis Confirmed/Reverse Barbeau Test Done by: MAKAYLA.   Post Procedure Teaching Performed.   Patient Verbalizes Understanding of Teaching. Access Site   * Percutaneous Puncture to right radial artery.   * 6FR 6FR Slender SWS Glidesheath Sheath Inserted Into right radial artery. Sedation Start Time:     13:52 Sedation Stop Time:     14:07 Sedation Total Time:     16 min Case Start Time:     13:59 Procedure(s) Performed   * Hemostasis with Radial Compression Device.   * Art Access - R radial artery.   * Coronary Angio.   * Left Heart Cath. Procedure Medications ------------------------------ Start:     1:52 PM Medication:     Versed Amount:     1 mg Route:     IV Administered By:     Gris Rodriguez Ordered By:     Rosita Liao ------------------------------ Start:     1:52 PM Medication:     Fentanyl Amount:     50 mcg Route:     IV Administered By:     Gris Rodriguez Ordered By:     Rosita Liao ------------------------------ Start:     2:00 PM Medication:     2% Lidocaine Amount:     2 ml Route:     Subcut Administered By:     Rosita Liao Ordered By:     Rosita Liao ------------------------------ Start:     2:01 PM Medication:     Heparin Amount:     5000 units Route:     IA Administered By:     Rosita Liao Ordered By:     Rosita Liao ------------------------------ Start:     2:01 PM Medication:     Nitroglycerin Amount:     100 mcg Route:     IA Administered By:     Rosita Liao Ordered By:     Rosita Liao ------------------------------ Start:     2:01 PM Medication:     Verapamil  "Amount:     2.5 mg Route:     IA Administered By:     Rosita Liao Ordered By:     Rosita Liao ------------------------------ Start:     2:03 PM Medication:     Nitroglycerin Amount:     240 mcg Route:     IC Administered By:     Rosita Liao Ordered By:     Rosita Liao X-ray Summary ------------------------------ Total Time (min):     1.40 Total Dose (mGy):     293 DAP (cGy*cm):     --- Contrast ------------------------------ Contrast:     Omnipaque 350 Amount (mL):     25 ml Report Signatures Finalized by Keshawn Becker MD on 12/16/2019 02:27 PM      Micro:  Results     Procedure Component Value Units Date/Time    CULTURE WOUND W/ GRAM STAIN [[id number]] Collected:  12/20/19 1512    Order Status:  Completed Specimen:  Wound Updated:  12/22/19 1046     Significant Indicator NEG     Source WND     Site Left leg     Culture Result No growth at 48 hours.     Gram Stain Result No organisms seen.    Narrative:       Surgery - swabs received    Anaerobic Culture [[id number]] Collected:  12/20/19 1512    Order Status:  Completed Specimen:  Wound Updated:  12/22/19 1046     Significant Indicator NEG     Source WND     Site Left leg     Culture Result Culture in progress.    Narrative:       Surgery - swabs received    Blood Culture [[id number]] Collected:  12/21/19 2210    Order Status:  Completed Specimen:  Blood from Peripheral Updated:  12/22/19 0719     Significant Indicator NEG     Source BLD     Site PERIPHERAL     Culture Result A significant status has been triggered by the BACTEC  instrument due to an increase in CO2 production.  Gram  stain of blood culture bottle shows NO ORGANISMS SEEN.  Further investigation is in progress.  Note: Blood cultures are incubated for 5 days and  are monitored continuously. Positive blood cultures  are called to the RN and reported as soon as  they are identified.      Narrative:       Special Contact Isolation  Per Hospital Policy: Only change Specimen Src: to \"Line\" " "if  specified by physician order.  Special Contact Isolation    Blood Culture [[id number]] Collected:  12/21/19 2210    Order Status:  Completed Specimen:  Blood from Peripheral Updated:  12/22/19 0138    Narrative:       Special Contact Isolation  Per Hospital Policy: Only change Specimen Src: to \"Line\" if  specified by physician order.    GRAM STAIN [[id number]] Collected:  12/20/19 1512    Order Status:  Completed Specimen:  Wound Updated:  12/21/19 0904     Significant Indicator .     Source WND     Site Left leg     Gram Stain Result No organisms seen.    Narrative:       Surgery - swabs received    BLOOD CULTURE [[id number]] Collected:  12/16/19 0432    Order Status:  Completed Specimen:  Blood from Peripheral Updated:  12/21/19 0700     Significant Indicator NEG     Source BLD     Site PERIPHERAL     Culture Result No growth after 5 days of incubation.    Narrative:       Per Hospital Policy: Only change Specimen Src: to \"Line\" if  specified by physician order.  Right Hand    BLOOD CULTURE [[id number]] Collected:  12/16/19 0450    Order Status:  Completed Specimen:  Blood from Peripheral Updated:  12/21/19 0700     Significant Indicator NEG     Source BLD     Site PERIPHERAL     Culture Result No growth after 5 days of incubation.    Narrative:       Per Hospital Policy: Only change Specimen Src: to \"Line\" if  specified by physician order.  No site indicated    URINALYSIS CULTURE, IF INDICATED [[id number]]  (Abnormal) Collected:  12/16/19 0541    Order Status:  Completed Specimen:  Urine Updated:  12/16/19 0555     Color Yellow     Character Clear     Specific Gravity 1.019     Ph 7.0     Glucose >=1000 mg/dL      Ketones Negative mg/dL      Protein 30 mg/dL      Bilirubin Negative     Urobilinogen, Urine 0.2     Nitrite Negative     Leukocyte Esterase Negative     Occult Blood Negative     Micro Urine Req Microscopic          Assessment:  Left lower extremity gangrene, LLE ulceration, and cellulitis  OM left " foot, multifocal.   Ischemic cardiomyopathy  Diabetes  Carbapenemase +in stool  PVD    Plan:  Left lower extremity gangrene, dry  LLE dorsal foot ulceration  Cellulitis, improved  Afebrile  No current leukcytosis  Blood cxs neg  DW Ortho: Had been seen at [facility] and declined amp toes/ BKA prior to trip to [facility]  Per patient while in [facility] did not seek any treatment for LLE-told doctors there just to dress it  Imaging +multifocal OM  S/p BKA 12/20-cxs neg  DC Zyvox and Unasyn    CAD  S/p cardiac cath  Plan for CABG  Ischemic CM with EF 15%  CXR pulm edema by my read    Diabetes.   Keep BS under 150 to help control current infection  -245    PVD  WIll impair abx efficacy and wound care  Vascular eval appreciated-no intervention planned    Stool PCR +carbapenemase  Contact isolation  Escalate abx if worsening infection        Will sign off-please reconsult if needed

## 2019-12-22 NOTE — THERAPY
"Pt s/p left BKA.  Hx of DM-2, HTN and PAD s/p stents.  No surgical intervention per cards for his cardio vascular dz.  He was indep PTA.  Admitted w/ gangrene of his toe, and is now a BKA.  He needs min assist to sit eob.  Able to stand, several attempts, all w/ spv.  Attempted ambulation and weight shifting w/o success.  Pt is fearful and apprehensive.  He is able to squat pivot bed to chair w/ verbal cues only.  educated regarding elevating LLE w/o pillows behind his knee.  Bed locked w/ knees in full extension.  PT will follow and address goals to improve functional mob and indep.    Physical Therapy Evaluation completed.   Bed Mobility:  Supine to Sit: (P) Minimal Assist  Transfers: Sit to Stand: (P) Supervised  Gait: Level Of Assist: (P) Unable to Participate with Front-Wheel Walker       Plan of Care: Will benefit from Physical Therapy 3 times per week  Discharge Recommendations: Equipment: Front-Wheel Walker. Post-acute therapy   Recommend post-acute placement for continued physical therapy services prior to discharge home. Patient can tolerate post-acute therapies at a 5x/week frequency.     See \"Rehab Therapy-Acute\" Patient Summary Report for complete documentation.     "

## 2019-12-22 NOTE — PROGRESS NOTES
"Dr Elizabeth updated on pt status. Temp now 97.4 and pt states he feels \"a lot better\". BP still low at 89/54. Orders to change holding parameters for all bp meds to <100. MD to discuss w/ day shift team on changing bp meds.   "

## 2019-12-22 NOTE — PROGRESS NOTES
Cardiology Follow Up Progress Note    Date of Service  12/22/2019    Attending Physician  Donny Mcnally M.D.      Presents with toe gangrene and found to have osteomyelitis    Cardiology consultation for preop evaluation given abnormal EKG and elevated troponin peaked to 64    History of type 2 diabetes, hypertension, peripheral arterial disease status post angioplasty diabetic ulcer of left midfoot    Labs reviewed  Hemoglobin 9.5, hematocrit 30, platelet count 149, sodium 129, potassium 4.5, creatinine stable    Blood pressure 111/66  Rhythm sinus    Interim Events    12/22/19 tired this morning, did not get enough sleep last night, no overnight cardiac events, denies angina, no dyspnea, discussed viability study as an outpatient and close follow-up with cardiology, he is in agreement, blood pressure soft last night Diovan was held.    12/21/19, stable post- left below the knee amputation on 12/20/19, no overnight cardiac events, sinus rhythm, no angina, denies dyspnea       12/20/19 no overnight cardiac events, maintaining sinus rhythm, n.p.o., plan for left below the knee amputation this afternoon, denies angina, no dyspnea questions answered.    Review of Systems  Review of Systems   Respiratory: Negative for apnea, cough, choking, chest tightness, shortness of breath, wheezing and stridor.        Vital signs in last 24 hours  Temp:  [36.2 °C (97.2 °F)-38.1 °C (100.6 °F)] 36.6 °C (97.9 °F)  Pulse:  [84-95] 90  Resp:  [16-21] 16  BP: ()/(53-72) 111/67  SpO2:  [92 %-99 %] 95 %    Physical Exam  Physical Exam  Cardiovascular:      Pulses: Normal pulses.   Pulmonary:      Effort: Pulmonary effort is normal.   Abdominal:      General: Abdomen is flat.   Skin:     General: Skin is warm.   Neurological:      General: No focal deficit present.      Mental Status: He is alert.   Psychiatric:         Mood and Affect: Mood normal.         Lab Review  Lab Results   Component Value Date/Time    WBC 6.8  12/22/2019 05:47 AM    RBC 3.60 (L) 12/22/2019 05:47 AM    HEMOGLOBIN 9.6 (L) 12/22/2019 05:47 AM    HEMATOCRIT 31.2 (L) 12/22/2019 05:47 AM    MCV 86.7 12/22/2019 05:47 AM    MCH 26.7 (L) 12/22/2019 05:47 AM    MCHC 30.8 (L) 12/22/2019 05:47 AM    MPV 9.7 12/22/2019 05:47 AM      Lab Results   Component Value Date/Time    SODIUM 129 (L) 12/22/2019 05:47 AM    POTASSIUM 4.5 12/22/2019 05:47 AM    CHLORIDE 96 12/22/2019 05:47 AM    CO2 26 12/22/2019 05:47 AM    GLUCOSE 175 (H) 12/22/2019 05:47 AM    BUN 17 12/22/2019 05:47 AM    CREATININE 0.73 12/22/2019 05:47 AM    CREATININE 0.9 05/09/2007 01:20 AM      Lab Results   Component Value Date/Time    ASTSGOT 43 12/16/2019 03:32 AM    ALTSGPT 48 12/16/2019 03:32 AM     Lab Results   Component Value Date/Time    CHOLSTRLTOT 262 (H) 06/15/2018 10:20 PM     (H) 06/15/2018 10:20 PM    HDL 54 06/15/2018 10:20 PM    TRIGLYCERIDE 132 06/15/2018 10:20 PM    TROPONINT 44 (H) 12/16/2019 08:30 AM       No results for input(s): NTPROBNP in the last 72 hours.    Cardiac Imaging and Procedures Review  EKG:  SR , Twave inversion    Echocardiogram: No thrombus. Mildly reduced left ventricular systolic function.Left ventricular ejection fraction is visually estimated to be 45%.There is apical septum and apical akinesis.       Cardiac Catheterization:  12/16/19    1. Severe LV systolic dysfunction by noninvasive evaluation.    2. Severe and diffuse obstructive three-vessel coronary artery disease.    3. Normal LVEDP.    Imaging  Chest X-Ray: Hazy and reticular bilateral lung base opacities, greater on the right, appearance suggests atelectasis and/or right lung base infiltrate.  2.  Trace right pleural effusion      Assessment/Plan    New cardiomyopathy, EF 45%  -Status post left heart cath 12/16/19 revealed multivessel CAD  -Appreciate CTS consult, to high risk for surgery, to complex disease for PCI, will attempt a viability study and reassess for revascularization as an  outpatient.  -Continue with aspirin 81, Lipitor 80, carvedilol 25 mg twice daily, Plavix 75 mg, Diovan 80 mg (held this morning for soft blood pressure), spironolactone 25 mg.  -denies angina      Chronic left foot ulcerations complicated with diabetes, osteomyelitis, left second and third toe gangrene, failed limb salvage  -s/p left below the knee amputation 12/20/19  -Appreciate ID  -On Unasyn and Zyvox    Will arrange for viability study and reassess for revascularization as an outpatient.  Cardiology will sign off.  We will schedule follow-up appointment with our cardiology office in 10-2 weeks.  Consider converting valsartan to Entresto at discharge if affordable to patient    Please contact me with any questions.    CATHY Ndiaye.   Cardiologist, [facility]  [phone]

## 2019-12-22 NOTE — WOUND TEAM
LPS stopped by pt's room to ensure pt understands the importance of keeping left knee straight in bed and in chair.  Discussed the inclination for knee to be in a flexed position after BKA and that should the knee become tight in this position it will interfere with transition to prosthesis.  Repositioned pillows under leg to encourage knee extension.  Spoke to RN about positioning.  Knee immobilizers are currently d/south by orthopedic surgeon.

## 2019-12-23 LAB
BACTERIA WND AEROBE CULT: NORMAL
BASOPHILS # BLD AUTO: 0.9 % (ref 0–1.8)
BASOPHILS # BLD: 0.06 K/UL (ref 0–0.12)
EOSINOPHIL # BLD AUTO: 0 K/UL (ref 0–0.51)
EOSINOPHIL NFR BLD: 0 % (ref 0–6.9)
ERYTHROCYTE [DISTWIDTH] IN BLOOD BY AUTOMATED COUNT: 51.4 FL (ref 35.9–50)
GLUCOSE BLD-MCNC: 129 MG/DL (ref 65–99)
GLUCOSE BLD-MCNC: 176 MG/DL (ref 65–99)
GLUCOSE BLD-MCNC: 198 MG/DL (ref 65–99)
GLUCOSE BLD-MCNC: 235 MG/DL (ref 65–99)
GRAM STN SPEC: NORMAL
HCT VFR BLD AUTO: 29 % (ref 42–52)
HGB BLD-MCNC: 9.1 G/DL (ref 14–18)
LYMPHOCYTES # BLD AUTO: 0.7 K/UL (ref 1–4.8)
LYMPHOCYTES NFR BLD: 10.4 % (ref 22–41)
MANUAL DIFF BLD: NORMAL
MCH RBC QN AUTO: 26.9 PG (ref 27–33)
MCHC RBC AUTO-ENTMCNC: 31.4 G/DL (ref 33.7–35.3)
MCV RBC AUTO: 85.8 FL (ref 81.4–97.8)
MONOCYTES # BLD AUTO: 0.47 K/UL (ref 0–0.85)
MONOCYTES NFR BLD AUTO: 7 % (ref 0–13.4)
MORPHOLOGY BLD-IMP: NORMAL
NEUTROPHILS # BLD AUTO: 5.47 K/UL (ref 1.82–7.42)
NEUTROPHILS NFR BLD: 81.7 % (ref 44–72)
NRBC # BLD AUTO: 0 K/UL
NRBC BLD-RTO: 0 /100 WBC
OVALOCYTES BLD QL SMEAR: NORMAL
PATHOLOGY CONSULT NOTE: NORMAL
PLATELET # BLD AUTO: 218 K/UL (ref 164–446)
PLATELET BLD QL SMEAR: NORMAL
PMV BLD AUTO: 10 FL (ref 9–12.9)
POIKILOCYTOSIS BLD QL SMEAR: NORMAL
RBC # BLD AUTO: 3.38 M/UL (ref 4.7–6.1)
RBC BLD AUTO: PRESENT
SCHISTOCYTES BLD QL SMEAR: NORMAL
SIGNIFICANT IND 70042: NORMAL
SITE SITE: NORMAL
SOURCE SOURCE: NORMAL
WBC # BLD AUTO: 6.7 K/UL (ref 4.8–10.8)

## 2019-12-23 PROCEDURE — A9270 NON-COVERED ITEM OR SERVICE: HCPCS | Performed by: NURSE PRACTITIONER

## 2019-12-23 PROCEDURE — 770020 HCHG ROOM/CARE - TELE (206)

## 2019-12-23 PROCEDURE — A9270 NON-COVERED ITEM OR SERVICE: HCPCS | Performed by: STUDENT IN AN ORGANIZED HEALTH CARE EDUCATION/TRAINING PROGRAM

## 2019-12-23 PROCEDURE — 700102 HCHG RX REV CODE 250 W/ 637 OVERRIDE(OP): Performed by: NURSE PRACTITIONER

## 2019-12-23 PROCEDURE — 700111 HCHG RX REV CODE 636 W/ 250 OVERRIDE (IP): Performed by: STUDENT IN AN ORGANIZED HEALTH CARE EDUCATION/TRAINING PROGRAM

## 2019-12-23 PROCEDURE — 36415 COLL VENOUS BLD VENIPUNCTURE: CPT

## 2019-12-23 PROCEDURE — 82962 GLUCOSE BLOOD TEST: CPT

## 2019-12-23 PROCEDURE — 700102 HCHG RX REV CODE 250 W/ 637 OVERRIDE(OP): Performed by: STUDENT IN AN ORGANIZED HEALTH CARE EDUCATION/TRAINING PROGRAM

## 2019-12-23 PROCEDURE — 85027 COMPLETE CBC AUTOMATED: CPT

## 2019-12-23 PROCEDURE — 85007 BL SMEAR W/DIFF WBC COUNT: CPT

## 2019-12-23 RX ORDER — FERROUS SULFATE 325(65) MG
325 TABLET ORAL
Status: DISCONTINUED | OUTPATIENT
Start: 2019-12-24 | End: 2019-12-27 | Stop reason: HOSPADM

## 2019-12-23 RX ORDER — GLIPIZIDE 5 MG/1
5 TABLET ORAL
Status: DISCONTINUED | OUTPATIENT
Start: 2019-12-23 | End: 2019-12-27 | Stop reason: HOSPADM

## 2019-12-23 RX ADMIN — GLIPIZIDE 5 MG: 5 TABLET ORAL at 14:40

## 2019-12-23 RX ADMIN — ATORVASTATIN CALCIUM 80 MG: 80 TABLET, FILM COATED ORAL at 18:15

## 2019-12-23 RX ADMIN — METFORMIN HYDROCHLORIDE 1000 MG: 500 TABLET, FILM COATED ORAL at 18:15

## 2019-12-23 RX ADMIN — HEPARIN SODIUM 5000 UNITS: 5000 INJECTION, SOLUTION INTRAVENOUS; SUBCUTANEOUS at 20:49

## 2019-12-23 RX ADMIN — CARVEDILOL 12.5 MG: 12.5 TABLET, FILM COATED ORAL at 18:15

## 2019-12-23 RX ADMIN — CLOPIDOGREL BISULFATE 75 MG: 75 TABLET ORAL at 20:49

## 2019-12-23 RX ADMIN — VALSARTAN 80 MG: 80 TABLET, FILM COATED ORAL at 18:15

## 2019-12-23 RX ADMIN — SPIRONOLACTONE 25 MG: 25 TABLET ORAL at 05:10

## 2019-12-23 RX ADMIN — HEPARIN SODIUM 5000 UNITS: 5000 INJECTION, SOLUTION INTRAVENOUS; SUBCUTANEOUS at 14:33

## 2019-12-23 RX ADMIN — INSULIN GLARGINE 17 UNITS: 100 INJECTION, SOLUTION SUBCUTANEOUS at 18:19

## 2019-12-23 RX ADMIN — ASPIRIN 81 MG: 81 TABLET, COATED ORAL at 05:10

## 2019-12-23 RX ADMIN — OXYCODONE HYDROCHLORIDE AND ACETAMINOPHEN 1 TABLET: 5; 325 TABLET ORAL at 20:49

## 2019-12-23 RX ADMIN — VALSARTAN 80 MG: 80 TABLET, FILM COATED ORAL at 05:10

## 2019-12-23 RX ADMIN — HEPARIN SODIUM 5000 UNITS: 5000 INJECTION, SOLUTION INTRAVENOUS; SUBCUTANEOUS at 05:10

## 2019-12-23 RX ADMIN — CARVEDILOL 12.5 MG: 12.5 TABLET, FILM COATED ORAL at 05:10

## 2019-12-23 NOTE — WOUND TEAM
In to see patient for LPS follow up to JAIME MARTIN POD 2.  Stopped by RN who states Dr. Bustamante is going to do dressing change tomorrow.  Confirmed in note for dressing change tomorrow.  LPS to follow up.

## 2019-12-23 NOTE — PROGRESS NOTES
[facility] [place] [facility] [place]     Attending: Dr. Arredondo  Resident: Teresa Canas ( PGY-1)  PATIENT: Israel Aviles; [id number]; 1965    ID:  54 y.o. male admitted for left lower extremity gangrene, LLE foot ulceration, subsequently found to have diffuse CAD by [facility], new heart failure.    SUBJECTIVE: No acute events overnight. Patient feels well this AM with no concerns. Denies CP, SOB, chills, fevers, sensation changes.Patient says he is mentally doing well post surgery. Wife was not present in room.     OBJECTIVE:     Vitals:    12/22/19 2038 12/22/19 2200 12/23/19 0113 12/23/19 0500   BP: (!) 95/56  119/71 116/66   Pulse: 92  93 91   Resp: 18  18 18   Temp: 36.1 °C (97 °F)  36.6 °C (97.8 °F) 36 °C (96.8 °F)   TempSrc: Temporal  Temporal Temporal   SpO2: 95%  93% 97%   Weight:  81.7 kg (180 lb 1.9 oz)     Height:           Intake/Output Summary (Last 24 hours) at 12/23/2019 0649  Last data filed at 12/23/2019 0500  Gross per 24 hour   Intake 240 ml   Output 1250 ml   Net -1010 ml       PE:  General: No acute distress, resting comfortably in bed.  HEENT: NC/AT. PERRLA. EOMI. MMM  Cardiovascular: RRR with no M/R/G.  Respiratory: Symmetrical chest. CTAB with no adventitious breath sounds   Abdomen: soft, NT/ND, no masses, +BS   EXT:  Left BKA   Neuro: AAO x 3    LABS:  Recent Labs     12/21/19  0318 12/22/19  0547 12/23/19  0146   WBC 5.3 6.8 6.7   RBC 3.47* 3.60* 3.38*   HEMOGLOBIN 9.5* 9.6* 9.1*   HEMATOCRIT 30.5* 31.2* 29.0*   MCV 87.9 86.7 85.8   MCH 27.4 26.7* 26.9*   RDW 53.5* 52.3* 51.4*   PLATELETCT 149* 199 218   MPV 10.2 9.7 10.0   NEUTSPOLYS 73.50* 72.90* 81.70*   LYMPHOCYTES 15.90* 16.30* 10.40*   MONOCYTES 7.90 9.50 7.00   EOSINOPHILS 1.90 0.70 0.00   BASOPHILS 0.40 0.30 0.90   RBCMORPHOLO  --   --  Present     Recent Labs     12/21/19  0318 12/22/19  0547   SODIUM 130* 129*   POTASSIUM 4.5 4.5   CHLORIDE 98 96   CO2 23 26   BUN 17 17   CREATININE 0.65 0.73   CALCIUM 8.0* 7.6*     Estimated  GFR/CRCL = Estimated Creatinine Clearance: 127 mL/min (by C-G formula based on SCr of 0.73 mg/dL).  Recent Labs     12/21/19  0318  12/22/19  0547  12/22/19  1805 12/22/19  2030 12/23/19  0507   GLUCOSE 207*  --  175*  --   --   --   --    POCGLUCOSE  --    < >  --    < > 248* 235* 129*    < > = values in this interval not displayed.                 No results for input(s): INR, APTT, FIBRINOGEN in the last 72 hours.    Invalid input(s): DIMER    MICROBIOLOGY:   BCx NGTD from 12/16  WND cx NGTD 12/16    IMAGING: reviewed    MEDS:  Current medications reviewed     ASSESSMENT/PLAN:   54 y.o. male admitted for left lower extremity gangrene, LLE foot ulceration, subsequently found to have diffuse CAD by [facility], new heart failure.     # LLE gangrene, dry -s/p BKA  # LLE doral foot ulceration - s/p BKA  # Osteomyelitis - s/p BKA  # Cellulitis -s/p BKA  - 6/19 underwent left dorsalus pedis angioplasty, anterior tibial angio and atherectomy, left posterior tibial A angio   - 8/19: foot infection Cx + MSSA and e faecalis tx 6 weeks daptomycin,  - Recent trip to [facility] received linezolid and imipenem   - Surgery postponed 2/2 to NSTEMI/diffuse CAD and new HF, per cards medical optimization for ~ 1 week  - Stool PCR + carbapenmase  -12/20 patient underwent Left BKA   - ID, Cards, wound care following: appreciate recommendations       Plan:  No antiobiotics post BKA, ID signed off  Follow up ortho recommendations post op  Wound care per team   Contact precautions   Will arrange Post acute therapy as recommended by PT for discharge  [facility] safe DC planning with social work today      # NSTEMI   # Severe/diffuse 3 vessel CAD   On admission SOB elevated trop 64 and EKG with TWI, Echo demonstrating new heart failure, underwent [facility] 12/16 show duffuse/severe disease in LAD, LCx, and RCA.  No intervention was preformed.   Cardiology following   Per cardiothoracic surgery patient not candidate for CABG, recommend cardiac medical  optimization     Plan:   Telemetry   Continue Coreg, valsartan, spirolactone, asa, Plavix, atorvastatin   Appreciate cardiology recs   Viability study to reassess for revascularization will be attempted as an O/P, per cards     #Heart Failure with reduced EF  - Likely 2/2 to diffuse CAD, PE work up negative  - ECHO on 12/16 showing new severely reduced LVF of 30-35%, BNP 6000 on admission, Repeat echo 12/18  EF 45%  - Cardiology following      Plan:   Coreg 6.25 mg BID, titrate as tolerated   Valsartan 80 mg BID, titrate as tolerated   Spironolactone 25 mg today  ASA, plavix, atorvastatin    Enrolled in HF program      # Possible LV Thrombus   Echo 12/16: demonstrating possible thrombus, heparin drip started 12/16, repeat echo 12/18 no evidence of thrombus.  Heparin d/c      # Anemia of chronic disease  # Iron Deficiency anemia  Denies melena  No hx of colonoscopy   Hgb back in August 2019 at 11  Iron studies showing TIB capacity 172, iron <10     Plan:  Will start iron supplement   Arrange colonoscopy with PCP post D/C     #Type 2 Diabetes  - A1C 8.5  - Goal BS <150 for infection control      Plan:  Restart home metformin and glipizide  Lantus 17 units at bedtime  CTM and adjust as appropriate     #Hyponatremia - stable  - Correct Na of 129 on admission, currently 130  CTM      #Constipation   Bowel protocol PRN     Core Measures   VTE PPx: heparin ppx  Abx: Unasyn, zyvox  Diet: Diabetic  Fluids: No IVFs  Lines and Tube: PIV   Code Status: Full code    Dispo: Inpaitient until post acute therapy arranged for outpatient

## 2019-12-24 ENCOUNTER — PATIENT OUTREACH (OUTPATIENT)
Dept: HEALTH INFORMATION MANAGEMENT | Facility: OTHER | Age: 54
End: 2019-12-24

## 2019-12-24 LAB — GLUCOSE BLD-MCNC: 145 MG/DL (ref 65–99)

## 2019-12-24 PROCEDURE — 770020 HCHG ROOM/CARE - TELE (206)

## 2019-12-24 PROCEDURE — 700105 HCHG RX REV CODE 258: Performed by: FAMILY MEDICINE

## 2019-12-24 PROCEDURE — 700102 HCHG RX REV CODE 250 W/ 637 OVERRIDE(OP): Performed by: STUDENT IN AN ORGANIZED HEALTH CARE EDUCATION/TRAINING PROGRAM

## 2019-12-24 PROCEDURE — A9270 NON-COVERED ITEM OR SERVICE: HCPCS | Performed by: STUDENT IN AN ORGANIZED HEALTH CARE EDUCATION/TRAINING PROGRAM

## 2019-12-24 PROCEDURE — 97530 THERAPEUTIC ACTIVITIES: CPT

## 2019-12-24 PROCEDURE — 700102 HCHG RX REV CODE 250 W/ 637 OVERRIDE(OP): Performed by: NURSE PRACTITIONER

## 2019-12-24 PROCEDURE — 97116 GAIT TRAINING THERAPY: CPT

## 2019-12-24 PROCEDURE — 82962 GLUCOSE BLOOD TEST: CPT

## 2019-12-24 PROCEDURE — 700105 HCHG RX REV CODE 258: Performed by: STUDENT IN AN ORGANIZED HEALTH CARE EDUCATION/TRAINING PROGRAM

## 2019-12-24 PROCEDURE — A9270 NON-COVERED ITEM OR SERVICE: HCPCS | Performed by: NURSE PRACTITIONER

## 2019-12-24 PROCEDURE — 700111 HCHG RX REV CODE 636 W/ 250 OVERRIDE (IP): Performed by: STUDENT IN AN ORGANIZED HEALTH CARE EDUCATION/TRAINING PROGRAM

## 2019-12-24 PROCEDURE — 97165 OT EVAL LOW COMPLEX 30 MIN: CPT

## 2019-12-24 RX ORDER — SODIUM CHLORIDE 9 MG/ML
250 INJECTION, SOLUTION INTRAVENOUS ONCE
Status: COMPLETED | OUTPATIENT
Start: 2019-12-24 | End: 2019-12-24

## 2019-12-24 RX ORDER — SODIUM CHLORIDE 9 MG/ML
INJECTION, SOLUTION INTRAVENOUS
Status: ACTIVE
Start: 2019-12-24 | End: 2019-12-24

## 2019-12-24 RX ADMIN — METFORMIN HYDROCHLORIDE 1000 MG: 500 TABLET, FILM COATED ORAL at 18:03

## 2019-12-24 RX ADMIN — VALSARTAN 80 MG: 80 TABLET, FILM COATED ORAL at 05:34

## 2019-12-24 RX ADMIN — SODIUM CHLORIDE 250 ML: 9 INJECTION, SOLUTION INTRAVENOUS at 19:49

## 2019-12-24 RX ADMIN — CARVEDILOL 12.5 MG: 12.5 TABLET, FILM COATED ORAL at 18:03

## 2019-12-24 RX ADMIN — GLIPIZIDE 5 MG: 5 TABLET ORAL at 05:34

## 2019-12-24 RX ADMIN — CARVEDILOL 12.5 MG: 12.5 TABLET, FILM COATED ORAL at 05:34

## 2019-12-24 RX ADMIN — SODIUM CHLORIDE 250 ML: 9 INJECTION, SOLUTION INTRAVENOUS at 10:45

## 2019-12-24 RX ADMIN — HEPARIN SODIUM 5000 UNITS: 5000 INJECTION, SOLUTION INTRAVENOUS; SUBCUTANEOUS at 14:26

## 2019-12-24 RX ADMIN — METFORMIN HYDROCHLORIDE 1000 MG: 500 TABLET, FILM COATED ORAL at 05:34

## 2019-12-24 RX ADMIN — CLOPIDOGREL BISULFATE 75 MG: 75 TABLET ORAL at 20:55

## 2019-12-24 RX ADMIN — SPIRONOLACTONE 25 MG: 25 TABLET ORAL at 05:34

## 2019-12-24 RX ADMIN — ATORVASTATIN CALCIUM 80 MG: 80 TABLET, FILM COATED ORAL at 20:56

## 2019-12-24 RX ADMIN — ASPIRIN 81 MG: 81 TABLET, COATED ORAL at 05:34

## 2019-12-24 RX ADMIN — VALSARTAN 80 MG: 80 TABLET, FILM COATED ORAL at 18:03

## 2019-12-24 RX ADMIN — HEPARIN SODIUM 5000 UNITS: 5000 INJECTION, SOLUTION INTRAVENOUS; SUBCUTANEOUS at 22:02

## 2019-12-24 RX ADMIN — HEPARIN SODIUM 5000 UNITS: 5000 INJECTION, SOLUTION INTRAVENOUS; SUBCUTANEOUS at 05:35

## 2019-12-24 RX ADMIN — INSULIN GLARGINE 17 UNITS: 100 INJECTION, SOLUTION SUBCUTANEOUS at 18:03

## 2019-12-24 RX ADMIN — FERROUS SULFATE TAB 325 MG (65 MG ELEMENTAL FE) 325 MG: 325 (65 FE) TAB at 05:34

## 2019-12-24 ASSESSMENT — COGNITIVE AND FUNCTIONAL STATUS - GENERAL
MOVING TO AND FROM BED TO CHAIR: A LITTLE
DAILY ACTIVITIY SCORE: 24
MOBILITY SCORE: 17
SUGGESTED CMS G CODE MODIFIER MOBILITY: CK
STANDING UP FROM CHAIR USING ARMS: A LITTLE
CLIMB 3 TO 5 STEPS WITH RAILING: A LOT
WALKING IN HOSPITAL ROOM: A LITTLE
MOVING FROM LYING ON BACK TO SITTING ON SIDE OF FLAT BED: A LITTLE
TURNING FROM BACK TO SIDE WHILE IN FLAT BAD: A LITTLE
SUGGESTED CMS G CODE MODIFIER DAILY ACTIVITY: CH

## 2019-12-24 ASSESSMENT — PATIENT HEALTH QUESTIONNAIRE - PHQ9
1. LITTLE INTEREST OR PLEASURE IN DOING THINGS: NOT AT ALL
SUM OF ALL RESPONSES TO PHQ9 QUESTIONS 1 AND 2: 0
2. FEELING DOWN, DEPRESSED, IRRITABLE, OR HOPELESS: NOT AT ALL

## 2019-12-24 ASSESSMENT — GAIT ASSESSMENTS
DISTANCE (FEET): 20
DEVIATION: STEP TO
ASSISTIVE DEVICE: FRONT WHEEL WALKER
GAIT LEVEL OF ASSIST: MINIMAL ASSIST

## 2019-12-24 ASSESSMENT — ACTIVITIES OF DAILY LIVING (ADL): TOILETING: INDEPENDENT

## 2019-12-24 NOTE — THERAPY
"Physical Therapy Evaluation completed.   Bed Mobility:  Supine to Sit: Supervised  Transfers: Sit to Stand: Supervised  Gait: Level Of Assist: Minimal Assist (for safety) with Front-Wheel Walker       Plan of Care: Will benefit from Physical Therapy 3 times per week  Discharge Recommendations: Equipment: Front-Wheel Walker and Wheelchair. Post-acute therapy: see below.    See \"Rehab Therapy-Acute\" Patient Summary Report for complete documentation.     Patient progressing with functional mobility. He performed bed mobility with supervision and transfers with FWW and supervision. Upon first standing EOB patient reported pain in LLE with limb in dependent position; pain so intense that patient experienced dizziness and required seated rest. Min A for safety provided during in room ambulation with FWW; ambulation distance limited given decreased activity tolerance. Provided education regarding ensuring proper muscle length in LLE in preparation for prosthetic. Currently recommend post acute placement prior to return home as patient would benefit from continued therapy to progress mobility given recent BKA. However, patient demonstrated safety with mobility and may return home following medical DC if he wishes to do so. Recommend supervision for OOB activity and home health PT if patient returns home. Will continue to follow while in house.  "

## 2019-12-24 NOTE — WOUND TEAM
LIMB PRESERVATION SERVICE   POD #4 left BKA by Dr. Zhao dressing changed, see below. Nursing to follow for QOD dressing changes:  Ace wrap and knee immobilizer reapplied after dressing changed. Dressing orders written for nursing.   Incision 12/20/19 Leg (Active)   Wound Image      Site Assessment Closed approximated incision    Angelica-wound Assessment Clean;Dry;Intact    Margins Attached edges    Closure Surface sutures;Approximated    Drainage Amount Scant    Drainage Description Serosanguineous    Treatments Cleansed    Periwound Protectant Not Applicable    Dressing Options Nonadherent Contact Layer;Dry Gauze;Roll Gauze;Tape    Dressing Changed New    Dressing Status Clean;Dry;Intact    Dressing Change Frequency Every 48 hrs    NEXT Dressing Change  12/26/19    NEXT Weekly Photo (Inpatient Only) 12/31/19    WOUND NURSE ONLY - Odor None    WOUND NURSE ONLY - Exposed Structures None    WOUND NURSE ONLY - Tissue Type and Percentage closed approximated incision

## 2019-12-24 NOTE — THERAPY
"Occupational Therapy Evaluation completed.   Functional Status:  Supervised with ADLs, supervised off toilet without using grab bars   Plan of Care: Patient with no further skilled OT needs in the acute care setting at this time, would benefit from HH therapy to help pt transition to home environment and make safety recommendations.  Will keep pt on OT caseload as D/C needs only.  Discharge Recommendations: Discharge to home with home health for additional skilled therapy services.    See \"Rehab Therapy-Acute\" Patient Summary Report for complete documentation.    "

## 2019-12-24 NOTE — PROGRESS NOTES
Bedside report received. POC discussed with pt; all questions answered at this time. Bed in lowest position with wheels locked and call light in reach.

## 2019-12-24 NOTE — PROGRESS NOTES
"Patient refusing his CHG bath at this time, \" for [facility] purposes, my wife is the only one who can do this.\" Pt reports his wife will be back tonight and she will do the bath. Charge KEENA Camarillo notified and aware   "

## 2019-12-24 NOTE — OP REPORT
DATE OF SERVICE:  12/20/2019    PREOPERATIVE DIAGNOSES:  Left foot osteomyelitis, chronic wounds, and failed   salvage procedures.    POSTOPERATIVE DIAGNOSES:  Left foot osteomyelitis, chronic wounds, and failed   salvage procedures.    PROCEDURE PERFORMED:  Left below-knee amputation.    SURGEON:  Koby Zhao MD    ASSISTANT:  Reinier Connors PA-C    ANESTHESIA:  General.    ESTIMATED BLOOD LOSS:  25 mL    COMPLICATIONS:  None.    OPERATIVE INDICATIONS:  The patient is a 54-year-old gentleman who has had a   history of wounds to the left foot for an extended period of time.  He has   undergone attempts at debridement and wound care without resolution to these.    Most recently, imaging has shown these extending into the midfoot with   osteomyelitis.  His last culture results came back with multidrug resistant   organisms.  In this setting, we discussed this issue and discussed treatment   options.  We recommended below-knee amputation as a way to remove the source   of his infection and give him a better chance of healing and mobilizing and   returning to his normal life.  We discussed that with this procedure, there   are risks including phantom limb pain, wound healing complications, stiffness,   possible need for revision surgeries in the future, continued risk of   infection as well as risk of loss of mobility.  He has a complicating factor   of significant coronary artery disease.  Given this high risk, we will likely   do this entire procedure in one setting versus a staged multistep amputation   for infection.  He understood these risks and agreed he wished to proceed.    PROCEDURE IN DETAIL:  The patient was seen in the preoperative holding on date   of surgery.  Left leg was marked with surgeon's initials.  He was then taken   to the operating room and placed supine on operative table.  Anesthesia was   induced.  Left lower extremity was prepped and draped in normal sterile   fashion.  Operative  pause was conducted to verify correct patient, site, side,   procedure as well as surgeon's initials on the operative extremity ____.    DICTATION ENDS HERE       ____________________________________     MD WEI Lawrence / FLORECITA    DD:  12/24/2019 07:06:45  DT:  12/24/2019 07:38:40    D#:  [id number]  Job#:  [id number]

## 2019-12-24 NOTE — PROGRESS NOTES
[facility] [place] [facility] PROGRESS NOTE     Attending: Dr. Arredondo  Resident:  Teresa Canas ( PGY-1)  PATIENT: Israel Aviles; [id number]; 1965    ID: 54 y.o. male admitted for left lower extremity gangrene, LLE foot ulceration, subsequently found to have diffuse CAD by [facility], new heart failure.    SUBJECTIVE: No acute events overnight, patient feeling well this AM. See by Occupational Therapy who are recomending Home Health. Patient will see PT again today for re-eval. Denies CP, SOB, fever, chills. Pain in left leg is well controlled.     OBJECTIVE:     Vitals:    12/23/19 1622 12/23/19 2000 12/24/19 0000 12/24/19 0500   BP: 119/69 (!) 93/59 101/54 110/73   Pulse: 88 84 86 81   Resp: 16 16 18 18   Temp: 36.4 °C (97.6 °F) 36.2 °C (97.1 °F) 36.3 °C (97.3 °F) 36.2 °C (97.1 °F)   TempSrc: Temporal Temporal Temporal Temporal   SpO2: 94% 98% 94% 97%   Weight:  78.9 kg (173 lb 15.1 oz)     Height:           Intake/Output Summary (Last 24 hours) at 12/24/2019 0746  Last data filed at 12/24/2019 0500  Gross per 24 hour   Intake 240 ml   Output 750 ml   Net -510 ml       PE:  General: No acute distress, resting comfortably in bed.  HEENT: NC/AT. PERRLA. EOMI. MMM  Cardiovascular: RRR with no M/R/G.  Respiratory: Symmetrical chest. CTAB with no adventitious breath sounds   Abdomen: soft, NT/ND, no masses, +BS   EXT:  Left BKA  Neuro: AAO x 3    LABS:  Recent Labs     12/22/19  0547 12/23/19  0146   WBC 6.8 6.7   RBC 3.60* 3.38*   HEMOGLOBIN 9.6* 9.1*   HEMATOCRIT 31.2* 29.0*   MCV 86.7 85.8   MCH 26.7* 26.9*   RDW 52.3* 51.4*   PLATELETCT 199 218   MPV 9.7 10.0   NEUTSPOLYS 72.90* 81.70*   LYMPHOCYTES 16.30* 10.40*   MONOCYTES 9.50 7.00   EOSINOPHILS 0.70 0.00   BASOPHILS 0.30 0.90   RBCMORPHOLO  --  Present     Recent Labs     12/22/19  0547   SODIUM 129*   POTASSIUM 4.5   CHLORIDE 96   CO2 26   BUN 17   CREATININE 0.73   CALCIUM 7.6*     Estimated GFR/CRCL = Estimated Creatinine Clearance: 127 mL/min (by C-G formula based  on SCr of 0.73 mg/dL).  Recent Labs     12/22/19  0547  12/23/19  0507 12/23/19  1210 12/23/19  1814   GLUCOSE 175*  --   --   --   --    POCGLUCOSE  --    < > 129* 176* 198*    < > = values in this interval not displayed.                 No results for input(s): INR, APTT, FIBRINOGEN in the last 72 hours.    Invalid input(s): DIMER    MICROBIOLOGY:   BCx NGTD from 12/16  WND cx NGTD 12/16    IMAGING: reviewed    MEDS:  Current medications reviewed     ASSESSMENT/PLAN:   54 y.o. male admitted for left lower extremity gangrene, LLE foot ulceration, subsequently found to have diffuse CAD by [facility], new heart failure now post op day 4 for left BKA     # LLE gangrene, dry -s/p BKA  # LLE doral foot ulceration - s/p BKA  # Osteomyelitis - s/p BKA  # Cellulitis -s/p BKA  - 6/19 underwent left dorsalus pedis angioplasty, anterior tibial angio and atherectomy, left posterior tibial A angio   - 8/19: foot infection Cx + MSSA and e faecalis tx 6 weeks daptomycin,  - Recent trip to [facility] received linezolid and imipenem   - Surgery postponed 2/2 to NSTEMI/diffuse CAD and new HF, per cards medical optimization for ~ 1 week  - Stool PCR + carbapenmase  -12/20 patient underwent Left BKA   - ID signed off   - Cards signed off      Plan:  No antiobiotics post BKA, ID signed off  Patient will need follow up with Ortho in 3 weeks for suture removal  Contact precautions   Will arrange Post acute therapy as recommended by PT for discharge  Physiatry Consult placed, appreciate recommendations     # NSTEMI   # Severe/diffuse 3 vessel CAD   On admission SOB elevated trop 64 and EKG with TWI, Echo demonstrating new heart failure, underwent [facility] 12/16 show duffuse/severe disease in LAD, LCx, and RCA.  No intervention was preformed.   Cardiology following   Per cardiothoracic surgery patient not candidate for CABG, recommend cardiac medical optimization     Plan:   -Telemetry   -Continue Coreg, valsartan, spirolactone, asa, Plavix,  atorvastatin   -Viability study to reassess for revascularization will be attempted as an O/P, per cards     #Heart Failure with reduced EF  - Likely 2/2 to diffuse CAD, PE work up negative  - ECHO on 12/16 showing new severely reduced LVF of 30-35%, BNP 6000 on admission, Repeat echo 12/18  EF 45%       Plan:   -Coreg 6.25 mg BID, titrate as tolerated   -Valsartan 80 mg BID, titrate as tolerated   -Spironolactone 25 mg today  -ASA, plavix, atorvastatin    -Enrolled in HF program      # Possible LV Thrombus   Echo 12/16: demonstrating possible thrombus, heparin drip started 12/16, repeat echo 12/18 no evidence of thrombus.  Heparin d/c      # Anemia of chronic disease  # Iron Deficiency anemia  Denies melena  No hx of colonoscopy   Hgb back in August 2019 at 11  Iron studies showing TIB capacity 172, iron <10      Plan:  -Will start iron supplement   -Follow up stool guaiac   -Arrange colonoscopy with PCP post D/C     #Type 2 Diabetes  - A1C 8.5  - Goal BS <150 for infection control      Plan:  Restart home metformin and glipizide  Lantus 17 units at bedtime  CTM and adjust as appropriate     #Hyponatremia - stable  - Correct Na of 129 on admission, last Na checked at 130  - D/C monitoring stable  - Promote PO intake     #Constipation   Bowel protocol PRN     Core Measures   VTE PPx: heparin ppx  Abx: Unasyn, zyvox  Diet: Diabetic  Fluids: No IVFs  Lines and Tube: PIV   Code Status: Full code     Dispo: Inpaitient until post acute therapy arranged for outpatient

## 2019-12-24 NOTE — PROGRESS NOTES
Patient A+Ox4, sitting up in chair at bedside. On room air. VSS. Denies pain, on telemetry. Left leg in immobilizer, dressing C/D/I. Working with therapy. Using call bell approp, bed locked and in lowest position, call bell in hand

## 2019-12-24 NOTE — OP REPORT
DATE OF SERVICE:  12/20/2019    ADDENDUM    PROCEDURE IN DETAIL:  The patient was seen in the preoperative holding on date   of surgery.  The left leg was marked with surgeon's initials.  He was then   taken to the operating room and placed supine on operating table.  Anesthesia   was induced.  The left lower extremity was then prepped and draped in normal   sterile fashion.  An operative pause was conducted.  Correct patient, site,   side, and procedure were identified as well as surgeon's initials on operative   extremity.  The foot was placed into an impervious stockinette with Coban.    We then reprepped the remainder of the leg to create a clean uncontaminated   field.  At this point, we marked out our incisions for amputation.  These were   carried through the skin with knife and cautery.  The anterior muscular   compartment was divided.  We elevated the fascia and periosteum off of the   tibia.  A tibial cut was made and the anterior edge bevelled.  Next, roughly a   centimeter proximal to this, the fibular cut was made as well.  At this   point, then we used an amputation knife to dissect through the deep posterior   compartment and leave a long muscular flap for closure.  This was then sharply   transected at the distal end of the posterior flap.    With the flap isolated, we then turned our attention to the neurovascular   bundles.  We isolated the posterior and anterior neurovascular bundles and    the vessels from their nerves.  These were doubly tied with silk   ties.  The nerves were placed on traction and cut and allowed to retract into   the wound.  The sciatic nerve was also injected with local anesthetic prior to   cutting this.  We also identified other additional nerves such as the sural   nerve that were retracted from the flap then cut.  At this point, we then   tailored our posterior flap.  We had to debulk some of the deep musculature   including some of the soleus for closure.  Once  this was debulked and the flap   was shaped for closure, we then closed in a layered fashion over a deep   Hemovac drain.  The tourniquet was let down prior to wound closure.  There was   no additional bleeding other than a few small venous muscular bleeders, which   were cauterized.  We then closed the deep fascias that were muscular flaps.    We then closed the subcutaneous tissues with interrupted Vicryl suture.  Skin   was closed with interrupted nylon suture.  A compressive dressing was then   placed to the stump.  The patient was also placed into a knee immobilizer to   maintain extension.  He was allowed to awaken in the operating room, taken to   PACU in stable condition.    POSTOPERATIVE PLAN:  He will be nonweightbearing to the left lower extremity.    Knee immobilizer should be used at rest to maintain extension.  He can take   this off to work on knee range of motion.  Dressing changes will be as needed.    The drain will be removed if less than 20 mL of output per shift.  The   patient will follow up in 3 weeks' time for wound check and suture removal.    It will likely be 2-3 months before he is able to be fitted for a prosthesis   once the wound is fully healed.       ____________________________________     MD WEI Lawrence / FLORECITA    DD:  12/24/2019 07:48:03  DT:  12/24/2019 08:07:02    D#:  [id number]  Job#:  [id number]

## 2019-12-24 NOTE — PROGRESS NOTES
Seems to be doing well today.  His pain is well controlled and denies any phantom pains.  Overall in good spirits.  No family currently at bedside.    Left lower extremity shows dressings that are clean dry and intact.  Knee immobilizer is in place.  The drain is been pulled.    Plan:  Dressings can be changed before discharge.  Patient will follow-up at 3 weeks time for suture removal.  Other dressing changes can be done with compressive wraps, dry dressings, knee immobilizer  Likely discharging for additional rehab post acute stay

## 2019-12-25 LAB
BACTERIA SPEC ANAEROBE CULT: NORMAL
GLUCOSE BLD-MCNC: 130 MG/DL (ref 65–99)
GLUCOSE BLD-MCNC: 162 MG/DL (ref 65–99)
GLUCOSE BLD-MCNC: 80 MG/DL (ref 65–99)
SIGNIFICANT IND 70042: NORMAL
SITE SITE: NORMAL
SOURCE SOURCE: NORMAL

## 2019-12-25 PROCEDURE — 700102 HCHG RX REV CODE 250 W/ 637 OVERRIDE(OP): Performed by: NURSE PRACTITIONER

## 2019-12-25 PROCEDURE — A9270 NON-COVERED ITEM OR SERVICE: HCPCS | Performed by: NURSE PRACTITIONER

## 2019-12-25 PROCEDURE — 700102 HCHG RX REV CODE 250 W/ 637 OVERRIDE(OP): Performed by: STUDENT IN AN ORGANIZED HEALTH CARE EDUCATION/TRAINING PROGRAM

## 2019-12-25 PROCEDURE — 770020 HCHG ROOM/CARE - TELE (206)

## 2019-12-25 PROCEDURE — A9270 NON-COVERED ITEM OR SERVICE: HCPCS | Performed by: STUDENT IN AN ORGANIZED HEALTH CARE EDUCATION/TRAINING PROGRAM

## 2019-12-25 PROCEDURE — 700111 HCHG RX REV CODE 636 W/ 250 OVERRIDE (IP): Performed by: STUDENT IN AN ORGANIZED HEALTH CARE EDUCATION/TRAINING PROGRAM

## 2019-12-25 PROCEDURE — 82962 GLUCOSE BLOOD TEST: CPT

## 2019-12-25 RX ORDER — CARVEDILOL 6.25 MG/1
6.25 TABLET ORAL 2 TIMES DAILY WITH MEALS
Status: DISCONTINUED | OUTPATIENT
Start: 2019-12-25 | End: 2019-12-27 | Stop reason: HOSPADM

## 2019-12-25 RX ORDER — VALSARTAN 80 MG/1
40 TABLET ORAL TWICE DAILY
Status: DISCONTINUED | OUTPATIENT
Start: 2019-12-25 | End: 2019-12-27 | Stop reason: HOSPADM

## 2019-12-25 RX ADMIN — HEPARIN SODIUM 5000 UNITS: 5000 INJECTION, SOLUTION INTRAVENOUS; SUBCUTANEOUS at 21:40

## 2019-12-25 RX ADMIN — VALSARTAN 40 MG: 80 TABLET, FILM COATED ORAL at 16:52

## 2019-12-25 RX ADMIN — HEPARIN SODIUM 5000 UNITS: 5000 INJECTION, SOLUTION INTRAVENOUS; SUBCUTANEOUS at 06:12

## 2019-12-25 RX ADMIN — INSULIN GLARGINE 17 UNITS: 100 INJECTION, SOLUTION SUBCUTANEOUS at 17:00

## 2019-12-25 RX ADMIN — FERROUS SULFATE TAB 325 MG (65 MG ELEMENTAL FE) 325 MG: 325 (65 FE) TAB at 06:59

## 2019-12-25 RX ADMIN — METFORMIN HYDROCHLORIDE 1000 MG: 500 TABLET, FILM COATED ORAL at 12:06

## 2019-12-25 RX ADMIN — ASPIRIN 81 MG: 81 TABLET, COATED ORAL at 06:12

## 2019-12-25 RX ADMIN — METFORMIN HYDROCHLORIDE 1000 MG: 500 TABLET, FILM COATED ORAL at 16:58

## 2019-12-25 RX ADMIN — CLOPIDOGREL BISULFATE 75 MG: 75 TABLET ORAL at 21:39

## 2019-12-25 RX ADMIN — CARVEDILOL 6.25 MG: 6.25 TABLET, FILM COATED ORAL at 16:53

## 2019-12-25 RX ADMIN — HEPARIN SODIUM 5000 UNITS: 5000 INJECTION, SOLUTION INTRAVENOUS; SUBCUTANEOUS at 16:53

## 2019-12-25 RX ADMIN — ATORVASTATIN CALCIUM 80 MG: 80 TABLET, FILM COATED ORAL at 21:39

## 2019-12-25 NOTE — PROGRESS NOTES
Bedside report received from previous nurse regarding prior 12 hours.  Pt sitting up in bed.  Denies pain.  White board updated.  Call light within reach.

## 2019-12-25 NOTE — CARE PLAN
Problem: Communication  Goal: The ability to communicate needs accurately and effectively will improve  Outcome: PROGRESSING AS EXPECTED     Problem: Safety  Goal: Will remain free from injury  Outcome: PROGRESSING AS EXPECTED     Problem: Bowel/Gastric:  Goal: Normal bowel function is maintained or improved  Outcome: PROGRESSING AS EXPECTED  Note:   Having regular BMs     Problem: Knowledge Deficit  Goal: Knowledge of disease process/condition, treatment plan, diagnostic tests, and medications will improve  Outcome: PROGRESSING AS EXPECTED     Problem: Respiratory:  Goal: Respiratory status will improve  Outcome: PROGRESSING AS EXPECTED  Note:   Room air     Problem: Knowledge Deficit  Goal: Knowledge of the prescribed therapeutic regimen will improve  Outcome: PROGRESSING SLOWER THAN EXPECTED  Note:   Family and patient need a lot of education around medication usage and purposes.

## 2019-12-25 NOTE — PROGRESS NOTES
[facility] [place] FAMILY MEDICINE PROGRESS NOTE     Attending:   Dr. Alan    Resident:   Sarah Cheng, PGY-2    PATIENT:   Israel Aviles; [id number]; 1965    ID:   54 y.o. male admitted for left lower extremity gangrene, LLE foot ulceration, subsequently found to have diffuse CAD by [facility], new heart failure.    SUBJECTIVE:   No acute events overnight. Patient continues to have intermittent low blood pressures with symptomatic hypotension yesterday afternoon after working with PT. Orthostatics were negative.   Patient denies fever, chills, pain, nausea, vomiting, diarrhea, dysuria.   Patient reports increased symptoms with anti-hypertensives    OBJECTIVE:  Vitals:    12/24/19 2100 12/24/19 2341 12/25/19 0032 12/25/19 0428   BP:  103/62 110/69 117/67   Pulse:  84 83 88   Resp:  18 18 18   Temp:  36.3 °C (97.4 °F) 36.2 °C (97.1 °F) 36.4 °C (97.6 °F)   TempSrc:  Temporal Temporal Temporal   SpO2:  96% 99% 98%   Weight: 79 kg (174 lb 2.6 oz)      Height:           Intake/Output Summary (Last 24 hours) at 12/25/2019 0635  Last data filed at 12/25/2019 0600  Gross per 24 hour   Intake 430 ml   Output 1200 ml   Net -770 ml       PHYSICAL EXAM:  General: No acute distress, afebrile, resting comfortably  HEENT: NC/AT. EOMI. MMM, neck supple without adenopathy or mass  Cardiovascular: RRR, NMGR, cap refill brisk.  Respiratory: CTAB, no tachypnea or retractions  Abdomen: soft, NT/ND, no masses  EXT:  L leg s/p BKA with dressing and knee immobilizer in place without strike-through. RLE warm and well perfused without edema.   Neuro: Non-focal    LABS:  Recent Labs     12/23/19  0146   WBC 6.7   RBC 3.38*   HEMOGLOBIN 9.1*   HEMATOCRIT 29.0*   MCV 85.8   MCH 26.9*   RDW 51.4*   PLATELETCT 218   MPV 10.0   NEUTSPOLYS 81.70*   LYMPHOCYTES 10.40*   MONOCYTES 7.00   EOSINOPHILS 0.00   BASOPHILS 0.90   RBCMORPHOLO Present     No results for input(s): SODIUM, POTASSIUM, CHLORIDE, CO2, BUN, CREATININE, CALCIUM, MAGNESIUM, PHOSPHORUS, ALBUMIN  in the last 72 hours.  Estimated GFR/CRCL = Estimated Creatinine Clearance: 127 mL/min (by C-G formula based on SCr of 0.73 mg/dL).  Recent Labs     12/23/19  1210 12/23/19 1814 12/24/19 1801   POCGLUCOSE 176* 198* 145*             IMAGING:  No new imaging in past 24 hours    MEDS:  Current Facility-Administered Medications   Medication Last Dose   • metFORMIN (GLUCOPHAGE) tablet 1,000 mg 1,000 mg at 12/24/19 1803   • glipiZIDE (GLUCOTROL) tablet 5 mg 5 mg at 12/24/19 0534   • ferrous sulfate tablet 325 mg 325 mg at 12/24/19 0534   • carvedilol (COREG) tablet 12.5 mg 12.5 mg at 12/24/19 1803   • insulin glargine (LANTUS) injection 17 Units 17 Units at 12/24/19 1803    And   • glucose 4 g chewable tablet 16 g      And   • DEXTROSE 10% BOLUS 250 mL     • oxyCODONE-acetaminophen (PERCOCET) 5-325 MG per tablet 1-2 Tab 1 Tab at 12/23/19 2049   • morphine (pf) 4 MG/ML injection 1 mg     • atorvastatin (LIPITOR) tablet 80 mg 80 mg at 12/24/19 2056   • heparin injection 5,000 Units 5,000 Units at 12/25/19 0612   • clopidogrel (PLAVIX) tablet 75 mg 75 mg at 12/24/19 2055   • acetaminophen (TYLENOL) tablet 500 mg 500 mg at 12/21/19 2035   • spironolactone (ALDACTONE) tablet 25 mg 25 mg at 12/24/19 0534   • valsartan (DIOVAN) tablet 80 mg 80 mg at 12/24/19 1803   • senna-docusate (PERICOLACE or SENOKOT S) 8.6-50 MG per tablet 2 Tab Stopped at 12/22/19 1800    And   • polyethylene glycol/lytes (MIRALAX) PACKET 1 Packet      And   • magnesium hydroxide (MILK OF MAGNESIA) suspension 30 mL      And   • bisacodyl (DULCOLAX) suppository 10 mg     • Respiratory Care per Protocol     • enalaprilat (VASOTEC) injection 1.25 mg     • cloNIDine (CATAPRES) tablet 0.1 mg     • aspirin EC (ECOTRIN) tablet 81 mg 81 mg at 12/25/19 0612       ASSESSMENT/PLAN:  54 y.o. male admitted for left lower extremity gangrene, LLE foot ulceration, subsequently found to have diffuse CAD by [facility], new heart failure.     # LLE gangrene, dry -s/p BKA  # LLE  doral foot ulceration - s/p BKA  # Osteomyelitis - s/p BKA  # Cellulitis -s/p BKA  - 6/19 underwent left dorsalus pedis angioplasty, anterior tibial angio and atherectomy, left posterior tibial A angio   - 8/19: foot infection Cx + MSSA and e faecalis tx 6 weeks daptomycin,  - Recent trip to [facility] received linezolid and imipenem   - Surgery postponed 2/2 to NSTEMI/diffuse CAD and new HF, per cards medical optimization for ~ 1 week  - Stool PCR + carbapenmase  -12/20 patient underwent Left BKA   -ID and cardiology signed off. Limb preserving team saw patient yesterday and changed dressings; wound is well-approximated.      Plan:  No antiobiotics post BKA, ID signed off  Follow up ortho ~3 weeks as outpatient  Wound care per team   Contact precautions   PT/OT to re-evaluate for d/c plan on 12/26 (post-acute rehab vs HH)     # NSTEMI   # Severe/diffuse 3 vessel CAD   On admission SOB elevated trop 64 and EKG with TWI, Echo demonstrating new heart failure, underwent LHC 12/16 show duffuse/severe disease in LAD, LCx, and RCA.  No intervention was preformed.   Cardiology following   Per cardiothoracic surgery patient not candidate for CABG, recommend cardiac medical optimization  Having symptomatic hypotension; orthostatics negative and no other signs/sx of infection; concerned this is iatrogenic    Plan:   Telemetry   Continue Coreg, valsartan, spirolactone, asa, Plavix, atorvastatin with decreased doses of Coreg and valsartan due to symptomatic hypotension     #Heart Failure with reduced EF  - Likely 2/2 to diffuse CAD, PE work up negative  - ECHO on 12/16 showing new severely reduced LVF of 30-35%, BNP 6000 on admission, Repeat echo 12/18  EF 45%  - Cardiology following   - Patient with intermittent hypotension (as above); no evidence of infection. Concern that this is iatrogenic with new cardiac medications.      Plan:   Coreg 6.5mg BID, reduced from 12.5 mg BID due to hypotension  Valsartan 40 mg BID, reduced from 80  mg BID due to hypotension  Spironolactone 25 mg today  ASA, plavix, atorvastatin    Enrolled in HF program      # Possible LV Thrombus, resolved   Echo 12/16: demonstrating possible thrombus, heparin drip started 12/16, repeat echo 12/18 no evidence of thrombus.      # Anemia of chronic disease  # Iron Deficiency anemia  Denies melena  No hx of colonoscopy   Hgb back in August 2019 at 11  Iron studies showing TIB capacity 172, iron <10      Plan:  - continue iron supplement  Arrange colonoscopy with PCP after d/c     #Type 2 Diabetes  - A1C 8.5  - Goal BS <150 for infection control   -  yesterday (only BG recorded)     Plan:  Continue home metformin and glipizide  Lantus 17 units at bedtime  CTM and adjust as appropriate     #Hyponatremia - stable  - Correct Na of 129 on admission, has been stable ~130        #Constipation   Bowel protocol PRN    Core Measures:  Lines: PIV  Tubes: N/A  Diet: Diabetic  Abx: None indicated  DVT Ppx: SC heparin   GI Ppx: not indicated  PCP: Dr. Mj Bai  CODE STATUS:      Dispo: inpatient pending re-evaluation for placement (acute rehab vs. Home)      Sarah Cheng, PGY-2

## 2019-12-26 LAB
GLUCOSE BLD-MCNC: 108 MG/DL (ref 65–99)
GLUCOSE BLD-MCNC: 123 MG/DL (ref 65–99)

## 2019-12-26 PROCEDURE — A9270 NON-COVERED ITEM OR SERVICE: HCPCS | Performed by: STUDENT IN AN ORGANIZED HEALTH CARE EDUCATION/TRAINING PROGRAM

## 2019-12-26 PROCEDURE — 700102 HCHG RX REV CODE 250 W/ 637 OVERRIDE(OP): Performed by: STUDENT IN AN ORGANIZED HEALTH CARE EDUCATION/TRAINING PROGRAM

## 2019-12-26 PROCEDURE — 700102 HCHG RX REV CODE 250 W/ 637 OVERRIDE(OP): Performed by: NURSE PRACTITIONER

## 2019-12-26 PROCEDURE — A9270 NON-COVERED ITEM OR SERVICE: HCPCS | Performed by: NURSE PRACTITIONER

## 2019-12-26 PROCEDURE — 99358 PROLONG SERVICE W/O CONTACT: CPT | Performed by: PHYSICAL MEDICINE & REHABILITATION

## 2019-12-26 PROCEDURE — 770020 HCHG ROOM/CARE - TELE (206)

## 2019-12-26 PROCEDURE — 82962 GLUCOSE BLOOD TEST: CPT | Mod: 91

## 2019-12-26 PROCEDURE — 700111 HCHG RX REV CODE 636 W/ 250 OVERRIDE (IP): Performed by: STUDENT IN AN ORGANIZED HEALTH CARE EDUCATION/TRAINING PROGRAM

## 2019-12-26 PROCEDURE — 97535 SELF CARE MNGMENT TRAINING: CPT

## 2019-12-26 RX ADMIN — CLOPIDOGREL BISULFATE 75 MG: 75 TABLET ORAL at 21:03

## 2019-12-26 RX ADMIN — HEPARIN SODIUM 5000 UNITS: 5000 INJECTION, SOLUTION INTRAVENOUS; SUBCUTANEOUS at 21:03

## 2019-12-26 RX ADMIN — METFORMIN HYDROCHLORIDE 1000 MG: 500 TABLET, FILM COATED ORAL at 10:06

## 2019-12-26 RX ADMIN — VALSARTAN 40 MG: 80 TABLET, FILM COATED ORAL at 05:32

## 2019-12-26 RX ADMIN — HEPARIN SODIUM 5000 UNITS: 5000 INJECTION, SOLUTION INTRAVENOUS; SUBCUTANEOUS at 14:15

## 2019-12-26 RX ADMIN — ASPIRIN 81 MG: 81 TABLET, COATED ORAL at 05:32

## 2019-12-26 RX ADMIN — FERROUS SULFATE TAB 325 MG (65 MG ELEMENTAL FE) 325 MG: 325 (65 FE) TAB at 10:06

## 2019-12-26 RX ADMIN — METFORMIN HYDROCHLORIDE 1000 MG: 500 TABLET, FILM COATED ORAL at 17:01

## 2019-12-26 RX ADMIN — HEPARIN SODIUM 5000 UNITS: 5000 INJECTION, SOLUTION INTRAVENOUS; SUBCUTANEOUS at 05:34

## 2019-12-26 RX ADMIN — SPIRONOLACTONE 25 MG: 25 TABLET ORAL at 05:33

## 2019-12-26 RX ADMIN — VALSARTAN 40 MG: 80 TABLET, FILM COATED ORAL at 17:01

## 2019-12-26 RX ADMIN — ATORVASTATIN CALCIUM 80 MG: 80 TABLET, FILM COATED ORAL at 21:03

## 2019-12-26 RX ADMIN — INSULIN GLARGINE 17 UNITS: 100 INJECTION, SOLUTION SUBCUTANEOUS at 17:03

## 2019-12-26 ASSESSMENT — COGNITIVE AND FUNCTIONAL STATUS - GENERAL
HELP NEEDED FOR BATHING: A LITTLE
SUGGESTED CMS G CODE MODIFIER DAILY ACTIVITY: CJ
DRESSING REGULAR LOWER BODY CLOTHING: A LITTLE
TOILETING: A LITTLE
DAILY ACTIVITIY SCORE: 21

## 2019-12-26 NOTE — CARE PLAN
Problem: Safety  Goal: Will remain free from falls  Outcome: PROGRESSING AS EXPECTED  Note:   Fall precautions in place. Bed in lowest position. Non-skid socks in place. Personal possessions within reach. Mobility sign on door. Bed-alarm on. Call light within reach. Pt educated regarding fall prevention and states understanding.        Problem: Knowledge Deficit  Goal: Knowledge of the prescribed therapeutic regimen will improve  Outcome: PROGRESSING AS EXPECTED  Note:   Pt educated regarding plan of care and medications. All questions answered.

## 2019-12-26 NOTE — DISCHARGE PLANNING
Anticipated Discharge Disposition:   · Rehab    Action:   · LSW notified that OT reassessed pt.   · Outreach call to [facility] to request pt be reassessed for appropriateness of facility. LSW left VM for admission department and requested call back at ext [phone].    Barriers to Discharge:   · Rehab acceptance     Plan:   · Care coordination will continue to follow up and provide assistance with discharge plans/barriers as needed.

## 2019-12-26 NOTE — DISCHARGE PLANNING
Anticipated Discharge Disposition:   · Home with HHC vs Rehab    Action:   · Pt's spouse and pt expressing interest in Rehab services prior to pt going home. LSW spoke with [facility] on 12/24/2019 to determine if pt would be a candidate for rehab. Per representative it is believed pt would be a good candidate but he reported based on current OT notes pt would likely not be approved. Representative indicated notes don't address pt's functions related to ADL needs/difficulties. Recommendation from acute was to have OT re-evaluate.   · Therapies not available on 12/25/2019 due to holiday. LSW updated BSN today regarding needs. BSN to follow up with therapies.     Barriers to Discharge:   · Determination of discharge services ie home with hhc or rehab    Plan:   · Care coordination will continue to follow up and provide assistance with discharge needs/barriers.

## 2019-12-26 NOTE — PROGRESS NOTES
Diabetes education: Pt has been seen by both Nettie BRINK CDE and this CDE. Please see previous notes.   Plan: Pt has no further needs at this time. Will follow blood sugars as pt is now on Lantus 15 units hs with Humalog sliding scale coverage ac and hs with blood sugars of 203 (2 units), 100, and 237 (2 units). Please call [phone] or reorder diabetes education if needs change.     Met with pt this afternoon. Pt states he uses Lantus pens and has insulin, pen needles as well as strips and meter. If pt to go home on fast insulin, please order Humalog kwik pen ( and make sure Humalog is covered vs Novolog).            Revision History

## 2019-12-26 NOTE — PROGRESS NOTES
[facility] [place] FAMILY MEDICINE PROGRESS NOTE     Attending: Dr. Garcia  Resident: Nichole Morel (PGY-1)  PATIENT: Israel Aviles; [id number]; 1965    ID:54 y.o. male admitted for left lower extremity gangrene, LLE foot ulceration, subsequently found to have diffuse CAD by [facility], new heart failure.    SUBJECTIVE: No acute events overnight, Pain well controlled.  Still dizzy after getting heart medications in AM.  BP wnl past 24 hours.  Strongly desires to go to inpatient rehab     OBJECTIVE:     Vitals:    12/25/19 1620 12/25/19 2000 12/26/19 0000 12/26/19 0400   BP: 122/74 108/53 117/73 108/74   Pulse: 89 90 91 94   Resp: 16 16 16 18   Temp: 36.6 °C (97.8 °F) 36.8 °C (98.3 °F) 37.4 °C (99.3 °F) 36.7 °C (98 °F)   TempSrc: Temporal Oral Oral Oral   SpO2: 97% 100% 97% 100%   Weight:       Height:           Intake/Output Summary (Last 24 hours) at 12/26/2019 0833  Last data filed at 12/25/2019 2000  Gross per 24 hour   Intake --   Output 400 ml   Net -400 ml       PE:  General: No acute distress, resting comfortably in bed.  HEENT: NC/AT. PERRLA. EOMI. MMM  Cardiovascular: RRR with no M/R/G.  Respiratory: Symmetrical chest. CTAB with no adventitious breath sounds   Abdomen: soft, NT/ND, no masses, +BS   EXT: Left BKA, cover in ACE wrap and knee brace   Neuro: non focal with no numbness, tingling or changes in sensation    LABS:  No results for input(s): WBC, RBC, HEMOGLOBIN, HEMATOCRIT, MCV, MCH, RDW, PLATELETCT, MPV, NEUTSPOLYS, LYMPHOCYTES, MONOCYTES, EOSINOPHILS, BASOPHILS, RBCMORPHOLO in the last 72 hours.  No results for input(s): SODIUM, POTASSIUM, CHLORIDE, CO2, BUN, CREATININE, CALCIUM, MAGNESIUM, PHOSPHORUS, ALBUMIN in the last 72 hours.  Estimated GFR/CRCL = Estimated Creatinine Clearance: 127 mL/min (by C-G formula based on SCr of 0.73 mg/dL).  Recent Labs     12/25/19  1658 12/25/19  1926 12/26/19  0540   POCGLUCOSE 130* 108* 123*                 No results for input(s): INR, APTT, FIBRINOGEN in the last 72  hours.    Invalid input(s): DIMER    MICROBIOLOGY:   reviewed    IMAGING:   Reviewed     MEDS:  Current medications reviewed     ASSESSMENT/PLAN:   54 y.o. male admitted for left lower extremity gangrene, LLE foot ulceration, subsequently found to have diffuse CAD by [facility], new heart failure.     # LLE gangrene, dry -s/p BKA  # LLE doral foot ulceration - s/p BKA  # Osteomyelitis - s/p BKA  # Cellulitis -s/p BKA  - 6/19 underwent left dorsalus pedis angioplasty, anterior tibial angio and atherectomy, left posterior tibial A angio   - 8/19: foot infection Cx + MSSA and e faecalis tx 6 weeks daptomycin,  - Recent trip to [facility] received linezolid and imipenem   - Surgery postponed 2/2 to NSTEMI/diffuse CAD and new HF, per cards medical optimization for ~ 1 week  - Stool PCR + carbapenmase  -12/20 patient underwent Left BKA   -ID and cardiology signed off. Limb preserving team saw patient yesterday and changed dressings; wound is well-approximated.      Plan:  No antiobiotics post BKA, ID signed off  Follow up ortho ~3 weeks as outpatient  Wound care per team   Contact precautions   PT/OT to re-evaluate for d/c plan on 12/26 (post-acute rehab vs HH)     # NSTEMI   # Severe/diffuse 3 vessel CAD   On admission SOB elevated trop 64 and EKG with TWI, Echo demonstrating new heart failure, underwent [facility] 12/16 show duffuse/severe disease in LAD, LCx, and RCA.  No intervention was preformed.   Cardiology following   Per cardiothoracic surgery patient not candidate for CABG, recommend cardiac medical optimization  Having symptomatic hypotension; orthostatics negative and no other signs/sx of infection; concerned this is iatrogenic     Plan:   Telemetry   Continue Coreg, valsartan, spirolactone, asa, Plavix, atorvastatin with decreased doses of Coreg and valsartan due to symptomatic hypotension     #Heart Failure with reduced EF  - Likely 2/2 to diffuse CAD, PE work up negative  - ECHO on 12/16 showing new severely reduced LVF of  30-35%, BNP 6000 on admission, Repeat echo 12/18  EF 45%  - Cardiology following   - Patient with intermittent hypotension (as above); no evidence of infection. Concern that this is iatrogenic with new cardiac medications.      Plan:   Coreg 6.5mg BID, reduced from 12.5 mg BID due to hypotension  Valsartan 40 mg BID, reduced from 80 mg BID due to hypotension  Spironolactone 25 mg today  ASA, plavix, atorvastatin    Enrolled in HF program      # Possible LV Thrombus, resolved   Echo 12/16: demonstrating possible thrombus, heparin drip started 12/16, repeat echo 12/18 no evidence of thrombus.      # Anemia of chronic disease  # Iron Deficiency anemia  Denies melena  No hx of colonoscopy   Hgb back in August 2019 at 11  Iron studies showing TIB capacity 172, iron <10      Plan:  - continue iron supplement  Arrange colonoscopy with PCP after d/c     #Type 2 Diabetes  - A1C 8.5  - Goal BS <150 for infection control   -  yesterday (only BG recorded)     Plan:  Continue home metformin and glipizide  Lantus 17 units at bedtime  CTM and adjust as appropriate     #Hyponatremia - stable  - Correct Na of 129 on admission, has been stable ~130     #Constipation   Bowel protocol PRN     Dispo: acute rehab vs home with home health PT today     Core Measures:  Lines: PIV  Tubes: N/A  Diet: Diabetic  Abx: None indicated  DVT Ppx: SC heparin   GI Ppx: not indicated  PCP: Dr. Mj Bai  CODE STATUS:

## 2019-12-26 NOTE — DISCHARGE PLANNING
[facility] Transitional Care Coordination     Referral from:  Dr. Nichole Morel    Facesheet indicates: [employer]    Potential Rehab Diagnosis: New BKA      Chart review indicates patient does have on going medical management and  therapy needs to possibly meet inpatient rehab facility criteria with the goal of returning to community.    D/C support: Spouse     Physiatry consultation forwarded per protocol.      POD #6 Left BKA. Anticipate post acute transitional care services to facilitate safe return to home/community. RPG to consult per protocol.     Thank you for the referral.

## 2019-12-26 NOTE — THERAPY
"Occupational Therapy Treatment completed with focus on ADLs and ADL transfers.  Functional Status:  Min A supine to sit.  Pt donned underwear seated EOB, then returned to supine to pull up while rolling in bed supervised.  Pt stood with min A and walked around bed with FWW with min A.  Pt reporting high fatigue and has been dizzy since starting new HF meds.  Pt returned to supine supervised.  Plan of Care: Will benefit from Occupational Therapy 3 times per week  Discharge Recommendations:  Equipment Will Continue to Assess for Equipment Needs. .Recommend post-acute placement for additional occupational therapy services prior to discharge home. Patient can tolerate post-acute therapies at a 5x/week frequency.    Pt seen for OT session.  Pt reports he feels weaker over past couple days and currently does not feel he can care for himself at home while his wife is at work.  Pt requiring min A with ADL transfers today.  Pt would likely have difficulty with high level ADLs/IADL's at home.  Pt will continue to benefit from acute OT services while he remains in house, and would benefit from post acute placement prior to DC home.    See \"Rehab Therapy-Acute\" Patient Summary Report for complete documentation.   "

## 2019-12-27 ENCOUNTER — HOSPITAL ENCOUNTER (INPATIENT)
Facility: REHABILITATION | Age: 54
LOS: 14 days | DRG: 559 | End: 2020-01-10
Attending: PHYSICAL MEDICINE & REHABILITATION | Admitting: PHYSICAL MEDICINE & REHABILITATION
Payer: COMMERCIAL

## 2019-12-27 VITALS
SYSTOLIC BLOOD PRESSURE: 99 MMHG | WEIGHT: 165.34 LBS | BODY MASS INDEX: 22.4 KG/M2 | TEMPERATURE: 96.9 F | DIASTOLIC BLOOD PRESSURE: 55 MMHG | RESPIRATION RATE: 18 BRPM | HEART RATE: 99 BPM | OXYGEN SATURATION: 99 % | HEIGHT: 72 IN

## 2019-12-27 DIAGNOSIS — I73.9 PVD (PERIPHERAL VASCULAR DISEASE) (HCC): ICD-10-CM

## 2019-12-27 DIAGNOSIS — S91.301A NON-HEALING WOUND OF RIGHT HEEL: ICD-10-CM

## 2019-12-27 PROBLEM — Z89.512 S/P BKA (BELOW KNEE AMPUTATION) UNILATERAL, LEFT (HCC): Status: ACTIVE | Noted: 2019-12-27

## 2019-12-27 LAB
BACTERIA BLD CULT: NORMAL
BACTERIA BLD CULT: NORMAL
GLUCOSE BLD-MCNC: 134 MG/DL (ref 65–99)
GLUCOSE BLD-MCNC: 156 MG/DL (ref 65–99)
GLUCOSE BLD-MCNC: 162 MG/DL (ref 65–99)
GLUCOSE BLD-MCNC: 186 MG/DL (ref 65–99)
GLUCOSE BLD-MCNC: 224 MG/DL (ref 65–99)
GLUCOSE BLD-MCNC: 93 MG/DL (ref 65–99)
SIGNIFICANT IND 70042: NORMAL
SIGNIFICANT IND 70042: NORMAL
SITE SITE: NORMAL
SITE SITE: NORMAL
SOURCE SOURCE: NORMAL
SOURCE SOURCE: NORMAL

## 2019-12-27 PROCEDURE — 82962 GLUCOSE BLOOD TEST: CPT

## 2019-12-27 PROCEDURE — A9270 NON-COVERED ITEM OR SERVICE: HCPCS | Performed by: PHYSICAL MEDICINE & REHABILITATION

## 2019-12-27 PROCEDURE — A9270 NON-COVERED ITEM OR SERVICE: HCPCS | Performed by: STUDENT IN AN ORGANIZED HEALTH CARE EDUCATION/TRAINING PROGRAM

## 2019-12-27 PROCEDURE — 700102 HCHG RX REV CODE 250 W/ 637 OVERRIDE(OP): Performed by: STUDENT IN AN ORGANIZED HEALTH CARE EDUCATION/TRAINING PROGRAM

## 2019-12-27 PROCEDURE — 82962 GLUCOSE BLOOD TEST: CPT | Mod: 91

## 2019-12-27 PROCEDURE — 700111 HCHG RX REV CODE 636 W/ 250 OVERRIDE (IP): Performed by: STUDENT IN AN ORGANIZED HEALTH CARE EDUCATION/TRAINING PROGRAM

## 2019-12-27 PROCEDURE — 700111 HCHG RX REV CODE 636 W/ 250 OVERRIDE (IP): Performed by: PHYSICAL MEDICINE & REHABILITATION

## 2019-12-27 PROCEDURE — 99223 1ST HOSP IP/OBS HIGH 75: CPT | Performed by: PHYSICAL MEDICINE & REHABILITATION

## 2019-12-27 PROCEDURE — 94760 N-INVAS EAR/PLS OXIMETRY 1: CPT

## 2019-12-27 PROCEDURE — 770010 HCHG ROOM/CARE - REHAB SEMI PRIVAT*

## 2019-12-27 PROCEDURE — A9270 NON-COVERED ITEM OR SERVICE: HCPCS | Performed by: NURSE PRACTITIONER

## 2019-12-27 PROCEDURE — 700102 HCHG RX REV CODE 250 W/ 637 OVERRIDE(OP): Performed by: PHYSICAL MEDICINE & REHABILITATION

## 2019-12-27 PROCEDURE — 700102 HCHG RX REV CODE 250 W/ 637 OVERRIDE(OP): Performed by: NURSE PRACTITIONER

## 2019-12-27 RX ORDER — CARVEDILOL 6.25 MG/1
6.25 TABLET ORAL 2 TIMES DAILY WITH MEALS
Status: CANCELLED | OUTPATIENT
Start: 2019-12-27

## 2019-12-27 RX ORDER — CLOPIDOGREL BISULFATE 75 MG/1
75 TABLET ORAL
Status: DISCONTINUED | OUTPATIENT
Start: 2019-12-27 | End: 2020-01-10 | Stop reason: HOSPADM

## 2019-12-27 RX ORDER — FERROUS SULFATE 325(65) MG
325 TABLET ORAL
Status: CANCELLED | OUTPATIENT
Start: 2019-12-28

## 2019-12-27 RX ORDER — ACETAMINOPHEN 325 MG/1
650 TABLET ORAL EVERY 4 HOURS PRN
Status: DISCONTINUED | OUTPATIENT
Start: 2019-12-27 | End: 2020-01-10 | Stop reason: HOSPADM

## 2019-12-27 RX ORDER — ONDANSETRON 2 MG/ML
4 INJECTION INTRAMUSCULAR; INTRAVENOUS 4 TIMES DAILY PRN
Status: DISCONTINUED | OUTPATIENT
Start: 2019-12-27 | End: 2020-01-10 | Stop reason: HOSPADM

## 2019-12-27 RX ORDER — POLYETHYLENE GLYCOL 3350 17 G/17G
1 POWDER, FOR SOLUTION ORAL
Status: DISCONTINUED | OUTPATIENT
Start: 2019-12-27 | End: 2020-01-03

## 2019-12-27 RX ORDER — ATORVASTATIN CALCIUM 40 MG/1
80 TABLET, FILM COATED ORAL NIGHTLY
Status: DISCONTINUED | OUTPATIENT
Start: 2019-12-27 | End: 2020-01-10 | Stop reason: HOSPADM

## 2019-12-27 RX ORDER — GLIPIZIDE 5 MG/1
5 TABLET ORAL
Status: CANCELLED | OUTPATIENT
Start: 2019-12-28

## 2019-12-27 RX ORDER — ATORVASTATIN CALCIUM 80 MG/1
80 TABLET, FILM COATED ORAL NIGHTLY
Status: CANCELLED | OUTPATIENT
Start: 2019-12-27

## 2019-12-27 RX ORDER — ECHINACEA PURPUREA EXTRACT 125 MG
2 TABLET ORAL PRN
Status: DISCONTINUED | OUTPATIENT
Start: 2019-12-27 | End: 2020-01-10 | Stop reason: HOSPADM

## 2019-12-27 RX ORDER — OXYCODONE HYDROCHLORIDE AND ACETAMINOPHEN 5; 325 MG/1; MG/1
1-2 TABLET ORAL EVERY 4 HOURS PRN
Status: DISCONTINUED | OUTPATIENT
Start: 2019-12-27 | End: 2020-01-10 | Stop reason: HOSPADM

## 2019-12-27 RX ORDER — SPIRONOLACTONE 25 MG/1
25 TABLET ORAL
Status: DISCONTINUED | OUTPATIENT
Start: 2019-12-28 | End: 2019-12-29

## 2019-12-27 RX ORDER — AMOXICILLIN 250 MG
2 CAPSULE ORAL 2 TIMES DAILY
Status: DISCONTINUED | OUTPATIENT
Start: 2019-12-27 | End: 2020-01-03

## 2019-12-27 RX ORDER — GLIPIZIDE 5 MG/1
5 TABLET ORAL
Status: DISCONTINUED | OUTPATIENT
Start: 2019-12-28 | End: 2019-12-29

## 2019-12-27 RX ORDER — INSULIN GLARGINE 100 [IU]/ML
17 INJECTION, SOLUTION SUBCUTANEOUS EVERY EVENING
Status: CANCELLED | OUTPATIENT
Start: 2019-12-27

## 2019-12-27 RX ORDER — TRAZODONE HYDROCHLORIDE 50 MG/1
50 TABLET ORAL
Status: DISCONTINUED | OUTPATIENT
Start: 2019-12-27 | End: 2020-01-10 | Stop reason: HOSPADM

## 2019-12-27 RX ORDER — HEPARIN SODIUM 5000 [USP'U]/ML
5000 INJECTION, SOLUTION INTRAVENOUS; SUBCUTANEOUS EVERY 8 HOURS
Status: CANCELLED | OUTPATIENT
Start: 2019-12-27

## 2019-12-27 RX ORDER — VALSARTAN 40 MG/1
40 TABLET ORAL 2 TIMES DAILY
Qty: 30 TAB | Refills: 3 | Status: ON HOLD
Start: 2019-12-27 | End: 2020-01-10

## 2019-12-27 RX ORDER — ENALAPRILAT 1.25 MG/ML
1.25 INJECTION INTRAVENOUS EVERY 6 HOURS PRN
Refills: 0 | Status: ON HOLD
Start: 2019-12-27 | End: 2020-01-10

## 2019-12-27 RX ORDER — GLIPIZIDE 5 MG/1
5 TABLET ORAL
Qty: 60 TAB | Status: ON HOLD
Start: 2019-12-28 | End: 2020-01-10 | Stop reason: SDUPTHER

## 2019-12-27 RX ORDER — OXYCODONE HYDROCHLORIDE AND ACETAMINOPHEN 5; 325 MG/1; MG/1
1-2 TABLET ORAL EVERY 4 HOURS PRN
Status: CANCELLED | OUTPATIENT
Start: 2019-12-27

## 2019-12-27 RX ORDER — CLOPIDOGREL BISULFATE 75 MG/1
75 TABLET ORAL
Status: CANCELLED | OUTPATIENT
Start: 2019-12-27

## 2019-12-27 RX ORDER — ATORVASTATIN CALCIUM 80 MG/1
80 TABLET, FILM COATED ORAL DAILY
Qty: 30 TAB | Status: ON HOLD
Start: 2019-12-27 | End: 2020-01-10 | Stop reason: SDUPTHER

## 2019-12-27 RX ORDER — SPIRONOLACTONE 25 MG/1
25 TABLET ORAL
Status: CANCELLED | OUTPATIENT
Start: 2019-12-28

## 2019-12-27 RX ORDER — ONDANSETRON 4 MG/1
4 TABLET, ORALLY DISINTEGRATING ORAL 4 TIMES DAILY PRN
Status: DISCONTINUED | OUTPATIENT
Start: 2019-12-27 | End: 2020-01-10 | Stop reason: HOSPADM

## 2019-12-27 RX ORDER — DEXTROSE MONOHYDRATE 25 G/50ML
50 INJECTION, SOLUTION INTRAVENOUS
Status: DISCONTINUED | OUTPATIENT
Start: 2019-12-27 | End: 2019-12-28

## 2019-12-27 RX ORDER — INSULIN GLARGINE 100 [IU]/ML
17 INJECTION, SOLUTION SUBCUTANEOUS EVERY EVENING
Status: DISCONTINUED | OUTPATIENT
Start: 2019-12-27 | End: 2019-12-28

## 2019-12-27 RX ORDER — BISACODYL 10 MG
10 SUPPOSITORY, RECTAL RECTAL
Status: DISCONTINUED | OUTPATIENT
Start: 2019-12-27 | End: 2020-01-03

## 2019-12-27 RX ORDER — VALSARTAN 80 MG/1
40 TABLET ORAL TWICE DAILY
Status: DISCONTINUED | OUTPATIENT
Start: 2019-12-27 | End: 2019-12-29

## 2019-12-27 RX ORDER — VALSARTAN 80 MG/1
40 TABLET ORAL TWICE DAILY
Status: CANCELLED | OUTPATIENT
Start: 2019-12-27

## 2019-12-27 RX ORDER — CARVEDILOL 6.25 MG/1
6.25 TABLET ORAL 2 TIMES DAILY WITH MEALS
Qty: 60 TAB | Status: ON HOLD
Start: 2019-12-27 | End: 2020-01-10

## 2019-12-27 RX ORDER — SPIRONOLACTONE 25 MG/1
25 TABLET ORAL DAILY
Qty: 30 TAB | Refills: 3 | Status: ON HOLD
Start: 2019-12-28 | End: 2020-01-10

## 2019-12-27 RX ORDER — FERROUS SULFATE 325(65) MG
325 TABLET ORAL
Status: DISCONTINUED | OUTPATIENT
Start: 2019-12-28 | End: 2020-01-10 | Stop reason: HOSPADM

## 2019-12-27 RX ORDER — POLYVINYL ALCOHOL 14 MG/ML
1 SOLUTION/ DROPS OPHTHALMIC PRN
Status: DISCONTINUED | OUTPATIENT
Start: 2019-12-27 | End: 2020-01-10 | Stop reason: HOSPADM

## 2019-12-27 RX ORDER — HEPARIN SODIUM 5000 [USP'U]/ML
5000 INJECTION, SOLUTION INTRAVENOUS; SUBCUTANEOUS EVERY 8 HOURS
Status: DISCONTINUED | OUTPATIENT
Start: 2019-12-27 | End: 2020-01-03

## 2019-12-27 RX ORDER — FERROUS SULFATE 325(65) MG
325 TABLET ORAL
Qty: 30 TAB | Status: ON HOLD
Start: 2019-12-28 | End: 2020-01-10 | Stop reason: SDUPTHER

## 2019-12-27 RX ORDER — HYDRALAZINE HYDROCHLORIDE 25 MG/1
25 TABLET, FILM COATED ORAL EVERY 8 HOURS PRN
Status: DISCONTINUED | OUTPATIENT
Start: 2019-12-27 | End: 2020-01-10 | Stop reason: HOSPADM

## 2019-12-27 RX ORDER — ALUMINA, MAGNESIA, AND SIMETHICONE 2400; 2400; 240 MG/30ML; MG/30ML; MG/30ML
20 SUSPENSION ORAL
Status: DISCONTINUED | OUTPATIENT
Start: 2019-12-27 | End: 2020-01-10 | Stop reason: HOSPADM

## 2019-12-27 RX ORDER — CARVEDILOL 3.12 MG/1
6.25 TABLET ORAL 2 TIMES DAILY WITH MEALS
Status: DISCONTINUED | OUTPATIENT
Start: 2019-12-27 | End: 2020-01-01

## 2019-12-27 RX ADMIN — SPIRONOLACTONE 25 MG: 25 TABLET ORAL at 05:21

## 2019-12-27 RX ADMIN — METFORMIN HYDROCHLORIDE 1000 MG: 500 TABLET, FILM COATED ORAL at 05:20

## 2019-12-27 RX ADMIN — HEPARIN SODIUM 5000 UNITS: 5000 INJECTION, SOLUTION INTRAVENOUS; SUBCUTANEOUS at 05:20

## 2019-12-27 RX ADMIN — VALSARTAN 40 MG: 80 TABLET, FILM COATED ORAL at 18:24

## 2019-12-27 RX ADMIN — ASPIRIN 81 MG: 81 TABLET, COATED ORAL at 05:21

## 2019-12-27 RX ADMIN — INSULIN GLARGINE 17 UNITS: 100 INJECTION, SOLUTION SUBCUTANEOUS at 20:33

## 2019-12-27 RX ADMIN — ATORVASTATIN CALCIUM 80 MG: 40 TABLET, FILM COATED ORAL at 20:29

## 2019-12-27 RX ADMIN — METFORMIN HYDROCHLORIDE 1000 MG: 500 TABLET ORAL at 18:23

## 2019-12-27 RX ADMIN — HEPARIN SODIUM 5000 UNITS: 5000 INJECTION, SOLUTION INTRAVENOUS; SUBCUTANEOUS at 20:30

## 2019-12-27 RX ADMIN — HEPARIN SODIUM 5000 UNITS: 5000 INJECTION, SOLUTION INTRAVENOUS; SUBCUTANEOUS at 14:04

## 2019-12-27 RX ADMIN — CLOPIDOGREL BISULFATE 75 MG: 75 TABLET ORAL at 20:29

## 2019-12-27 RX ADMIN — CARVEDILOL 6.25 MG: 3.12 TABLET, FILM COATED ORAL at 18:23

## 2019-12-27 RX ADMIN — FERROUS SULFATE TAB 325 MG (65 MG ELEMENTAL FE) 325 MG: 325 (65 FE) TAB at 05:21

## 2019-12-27 ASSESSMENT — COPD QUESTIONNAIRES
DURING THE PAST 4 WEEKS HOW MUCH DID YOU FEEL SHORT OF BREATH: NONE/LITTLE OF THE TIME
DO YOU EVER COUGH UP ANY MUCUS OR PHLEGM?: NO/ONLY WITH OCCASIONAL COLDS OR INFECTIONS
HAVE YOU SMOKED AT LEAST 100 CIGARETTES IN YOUR ENTIRE LIFE: NO/DON'T KNOW
COPD SCREENING SCORE: 1

## 2019-12-27 ASSESSMENT — PATIENT HEALTH QUESTIONNAIRE - PHQ9
1. LITTLE INTEREST OR PLEASURE IN DOING THINGS: NOT AT ALL
2. FEELING DOWN, DEPRESSED, IRRITABLE, OR HOPELESS: NOT AT ALL
1. LITTLE INTEREST OR PLEASURE IN DOING THINGS: NOT AT ALL
SUM OF ALL RESPONSES TO PHQ9 QUESTIONS 1 AND 2: 0
SUM OF ALL RESPONSES TO PHQ9 QUESTIONS 1 AND 2: 0
2. FEELING DOWN, DEPRESSED, IRRITABLE, OR HOPELESS: NOT AT ALL

## 2019-12-27 ASSESSMENT — LIFESTYLE VARIABLES
HAVE PEOPLE ANNOYED YOU BY CRITICIZING YOUR DRINKING: NO
TOTAL SCORE: 0
CONSUMPTION TOTAL: NEGATIVE
ON A TYPICAL DAY WHEN YOU DRINK ALCOHOL HOW MANY DRINKS DO YOU HAVE: 0
HOW MANY TIMES IN THE PAST YEAR HAVE YOU HAD 5 OR MORE DRINKS IN A DAY: 0
AVERAGE NUMBER OF DAYS PER WEEK YOU HAVE A DRINK CONTAINING ALCOHOL: 0
ALCOHOL_USE: NO
TOTAL SCORE: 0
EVER HAD A DRINK FIRST THING IN THE MORNING TO STEADY YOUR NERVES TO GET RID OF A HANGOVER: NO
EVER FELT BAD OR GUILTY ABOUT YOUR DRINKING: NO
DOES PATIENT WANT TO STOP DRINKING: NO
HAVE YOU EVER FELT YOU SHOULD CUT DOWN ON YOUR DRINKING: NO
EVER_SMOKED: NEVER
TOTAL SCORE: 0

## 2019-12-27 NOTE — DISCHARGE INSTRUCTIONS
Discharge Instructions    Discharged to other by medical transportation with escort. Discharged via wheelchair, hospital escort: Yes.  Special equipment needed: Immobilizer    Be sure to schedule a follow-up appointment with your primary care doctor or any specialists as instructed.     Discharge Plan:   Diet Plan: Discussed  Activity Level: Discussed  Confirmed Follow up Appointment: Appointment Scheduled  Confirmed Symptoms Management: Discussed  Medication Reconciliation Updated: Yes  Influenza Vaccine Indication: Patient Refuses    I understand that a diet low in cholesterol, fat, and sodium is recommended for good health. Unless I have been given specific instructions below for another diet, I accept this instruction as my diet prescription.   Other diet:     Special Instructions:   HF Patient Discharge Instructions  · Monitor your weight daily, and maintain a weight chart, to track your weight changes.   · Activity as tolerated, unless your Doctor has ordered otherwise. Other activity order: .  · Follow a low fat, low cholesterol, low salt diet unless instructed otherwise by your Doctor. Read the labels on the back of food products and track your intake of fat, cholesterol and salt.   · Fluid Restriction No. If a Fluid Restriction has been ordered by your Doctor, measure fluids with a measuring cup to ensure that you are not exceeding the restriction.   · No smoking.  · Oxygen No. If your Doctor has ordered that you wear Oxygen at home, it is important to wear it as ordered.  · Did you receive an explanation from staff on the importance of taking each of your medications and why it is necessary to keep taking them unless your doctor says to stop? Yes  · Were all of your questions answered about how to manage your heart failure and what to do if you have increased signs and symptoms after you go home? Yes  · Do you feel like your heart failure care team involved you in the care treatment plan and allowed you to  make decisions regarding your care while in the hospital and addressed any discharge needs you might have? Yes    See the educational handout provided at discharge for more information on monitoring your daily weight, activity and diet. This also explains more about Heart Failure, symptoms of a flare-up and some of the tests that you have undergone.     Warning Signs of a Flare-Up include:  · Swelling in the ankles or lower legs.  · Shortness of breath, while at rest, or while doing normal activities.   · Shortness of breath at night when in bed, or coughing in bed.   · Requiring more pillows to sleep at night, or needing to sit up at night to sleep.  · Feeling weak, dizzy or fatigued.     When to call your Doctor:  · Call [facility] seven days a week from 8:00 a.m. to 8:00 p.m. for medical questions [phone].  · Call your Primary Care Physician or Cardiologist if:   1. You experience any pain radiating to your jaw or neck.  2. You have any difficulty breathing.  3. You experience weight gain of 3 lbs in a day or 5 lbs in a week.   4. You feel any palpitations or irregular heartbeats.  5. You become dizzy or lose consciousness.   If you have had an angiogram or had a pacemaker or AICD placed, and experience:  1. Bleeding, drainage or swelling at the surgical / puncture site.  2. Fever greater than 100.0 F  3. Shock from internal defibrillator.  4. Cool and / or numb extremities.      · Is patient discharged on Warfarin / Coumadin?   No     Depression / Suicide Risk    As you are discharged from this [facility], it is important to learn how to keep safe from harming yourself.    Recognize the warning signs:  · Abrupt changes in personality, positive or negative- including increase in energy   · Giving away possessions  · Change in eating patterns- significant weight changes-  positive or negative  · Change in sleeping patterns- unable to sleep or sleeping all the time   · Unwillingness or  inability to communicate  · Depression  · Unusual sadness, discouragement and loneliness  · Talk of wanting to die  · Neglect of personal appearance   · Rebelliousness- reckless behavior  · Withdrawal from people/activities they love  · Confusion- inability to concentrate     If you or a loved one observes any of these behaviors or has concerns about self-harm, here's what you can do:  · Talk about it- your feelings and reasons for harming yourself  · Remove any means that you might use to hurt yourself (examples: pills, rope, extension cords, firearm)  · Get professional help from the community (Mental Health, Substance Abuse, psychological counseling)  · Do not be alone:Call your Safe Contact- someone whom you trust who will be there for you.  · Call your local CRISIS HOTLINE 025-0105 or 612-999-1840  · Call your local Children's Mobile Crisis Response Team [place] (034) 013-1232 or www.[employer]  · Call the toll free National Suicide Prevention Hotlines   · National Suicide Prevention Lifeline 085-435-ECWT (5171)  · [employer] Hope Line Network 800-SUICIDE (576-8527)      Gangrene  Introduction  Gangrene is a medical condition caused by a lack of blood supply to an area of your body. Oxygen travels through your blood, so less blood means less oxygen. Without oxygen, your body tissue will start to die.  Gangrene can affect many parts of your body. Gangrene is most common on the skin (external gangrene), but it can also affect internal parts of the body (internal gangrene).  Gangrene requires emergency treatment.  What are the causes?  Any condition that cuts off blood supply can cause gangrene. Common causes include:  · Injuries.  · Blood vessel disease.  · Diabetes.  · Infections.  What increases the risk?  You may be at increased risk for gangrene if you have:  · A serious injury.  · Recent surgery.  · Diabetes.  · A weak body defense system (immune system).  · Narrowing of your arteries  (arteriosclerosis).  · An immune system disease that causes your arteries to constrict (Raynaud disease).  · A history of IV drug use.  What are the signs or symptoms?  The signs and symptoms depend on what part of your body is affected. If you have external gangrene, you may have:  · Severe pain followed by loss of feeling.  · Redness and swelling.  · Crackling sounds when you press on a swollen area of skin.  · A wound with bad-smelling drainage.  · Changes in the color of your skin. It may turn red, blue, black, or white.  · Fever and chills.  If you have internal gangrene, you may have:  · Fever and chills.  · Confusion.  · Dizziness.  · Severe pain.  · Rapid heartbeat and breathing.  · Loss of appetite.  · Nausea or vomiting.  How is this diagnosed?  To diagnose gangrene, your health care provider will take your medical history and do a physical exam. Tests may also be done to help in making the diagnosis. These may include:  · X-rays of your blood vessels after injection of a dye (arteriogram).  · Blood tests to look for an infection in your blood.  · Imaging studies such as a CT scan or MRI.  · Taking a swab from a draining wound to look for bacteria in the laboratory (culture).  · Taking a piece of tissue (biopsy) to look for cell death in the laboratory.  · A surgical procedure to look for gangrene inside your body.  How is this treated?  Treatment for gangrene depends on the cause and the area of your body that is affected. Gangrene is usually treated in the hospital. It is very important to start treatment early before gangrene spreads. Treatment may include the following:  · Medicine. You may get high doses of antibiotic medicine for gangrene caused by infection. The antibiotics may be given directly into a vein through an IV tube.  · Surgery. Surgical options may include:  ¨ Debridement. This is surgery to remove dead tissue. You may need to have debridement several times.  ¨ Bypass or angioplasty. This  surgery improves the blood supply to an affected area.  ¨ Amputation. Sometimes it is necessary to remove a body part.  · Oxygen therapy. This involves treatment in a chamber designed to provide high levels of oxygen (hyperbaric oxygen therapy). It can improve the oxygen supply to an affected area.  · Supportive care. This may include:  ¨ IV fluids and nutrients.  ¨ Pain medicines.  ¨ Blood thinners to prevent blood clots.  Follow these instructions at home:  · Take medicines only as directed by your health care provider.  · If you were prescribed an antibiotic medicine, finish it all even if you start to feel better.  · Clean any cuts or scratches with an antiseptic.  · Cover any open wounds.  · Check wounds for signs of infection. Watch for redness or swelling.  · Do not use any tobacco products, including cigarettes, chewing tobacco, or electronic cigarettes. If you need help quitting, ask your health care provider.  · Limit alcohol intake to no more than 1 drink per day for nonpregnant women and 2 drinks per day for men. One drink equals 12 ounces of beer, 5 ounces of wine, or 1½ ounces of hard liquor.  · Eat a healthy diet and maintain a healthy weight.  · Get some exercise on most days of the week. Ask your health care provider to suggest activities that are safe for you.  · If you have diabetes or another blood vessel disease, check your feet often for signs of injury or infection.  · After any surgery you have, follow your health care provider's instructions carefully.  · Keep all follow-up visits as directed by your health care provider. This is important.  Contact a health care provider if:  · You have a wound or sore that is not healing.  · You have color changes (white, red, blue, or black) in an area of your skin.  · You have a wound or sore with smelly drainage.  Get help right away if:  · You have rapidly worsening pain at the site of a skin infection or wound.  · You lose sensation at the site of a  skin infection or wound.  · You have unexplained:  ¨ Fever.  ¨ Chills.  ¨ Confusion.  ¨ Fainting.  This information is not intended to replace advice given to you by your health care provider. Make sure you discuss any questions you have with your health care provider.  Document Released: 10/19/2005 Document Revised: 05/25/2017 Document Reviewed: 02/11/2015 © 2017 Elsevier      Heart Failure  Heart failure means your heart has trouble pumping blood. This makes it hard for your body to work well. Heart failure is usually a long-term (chronic) condition. You must take good care of yourself and follow your doctor's treatment plan.  HOME CARE  · Take your heart medicine as told by your doctor.  ¨ Do not stop taking medicine unless your doctor tells you to.  ¨ Do not skip any dose of medicine.  ¨ Refill your medicines before they run out.  ¨ Take other medicines only as told by your doctor or pharmacist.  · Stay active if told by your doctor. The elderly and people with severe heart failure should talk with a doctor about physical activity.  · Eat heart-healthy foods. Choose foods that are without trans fat and are low in saturated fat, cholesterol, and salt (sodium). This includes fresh or frozen fruits and vegetables, fish, lean meats, fat-free or low-fat dairy foods, whole grains, and high-fiber foods. Lentils and dried peas and beans (legumes) are also good choices.  · Limit salt if told by your doctor.  · Cook in a healthy way. Roast, grill, broil, bake, poach, steam, or stir-ybarra foods.  · Limit fluids as told by your doctor.  · Weigh yourself every morning. Do this after you pee (urinate) and before you eat breakfast. Write down your weight to give to your doctor.  · Take your blood pressure and write it down if your doctor tells you to.  · Ask your doctor how to check your pulse. Check your pulse as told.  · Lose weight if told by your doctor.  · Stop smoking or chewing tobacco. Do not use gum or patches that  help you quit without your doctor's approval.  · Schedule and go to doctor visits as told.  · Nonpregnant women should have no more than 1 drink a day. Men should have no more than 2 drinks a day. Talk to your doctor about drinking alcohol.  · Stop illegal drug use.  · Stay current with shots (immunizations).  · Manage your health conditions as told by your doctor.  · Learn to manage your stress.  · Rest when you are tired.  · If it is really hot outside:  ¨ Avoid intense activities.  ¨ Use air conditioning or fans, or get in a cooler place.  ¨ Avoid caffeine and alcohol.  ¨ Wear loose-fitting, lightweight, and light-colored clothing.  · If it is really cold outside:  ¨ Avoid intense activities.  ¨ Layer your clothing.  ¨ Wear mittens or gloves, a hat, and a scarf when going outside.  ¨ Avoid alcohol.  · Learn about heart failure and get support as needed.  · Get help to maintain or improve your quality of life and your ability to care for yourself as needed.  GET HELP IF:   · You gain weight quickly.  · You are more short of breath than usual.  · You cannot do your normal activities.  · You tire easily.  · You cough more than normal, especially with activity.  · You have any or more puffiness (swelling) in areas such as your hands, feet, ankles, or belly (abdomen).  · You cannot sleep because it is hard to breathe.  · You feel like your heart is beating fast (palpitations).  · You get dizzy or light-headed when you stand up.  GET HELP RIGHT AWAY IF:   · You have trouble breathing.  · There is a change in mental status, such as becoming less alert or not being able to focus.  · You have chest pain or discomfort.  · You faint.  MAKE SURE YOU:   · Understand these instructions.  · Will watch your condition.  · Will get help right away if you are not doing well or get worse.  This information is not intended to replace advice given to you by your health care provider. Make sure you discuss any questions you have with  your health care provider.  Document Released: 09/26/2009 Document Revised: 01/08/2016 Document Reviewed: 02/03/2014  [employer] Interactive Patient Education © 2017 [employer]      Coronary Artery Disease, Male  Coronary artery disease (CAD) is a process in which the blood vessels of the heart (coronary arteries) become narrow or blocked. The narrowing or blockage can lead to decreased blood flow to the heart muscle (angina). Prolonged reduced blood flow can cause a heart attack (myocardial infarction, MI). Because CAD is the leading cause of death in men, it is important to understand what causes this condition and how it is treated.  What are the causes?  Atherosclerosis is the cause of CAD. This is the buildup of fat and cholesterol (plaque) on the inside of the arteries. Over time, the plaque may narrow or block the artery, and this will lessen blood flow to the heart. Plaque can also become weak and break off within a coronary artery to form a clot and cause a sudden blockage.  What increases the risk?  Many risk factors increase your chances of getting CAD, including:  · High cholesterol levels.  · High blood pressure (hypertension).  · Tobacco use.  · Diabetes.  · Age. Men over age 45 are at a greater risk of CAD.  · Gender. Men often develop CAD earlier in life than women.  · Family history of CAD.  · Obesity.  · Lack of exercise.  · A diet high in saturated fats.  What are the signs or symptoms?  Many people do not experience any symptoms during the early stages of CAD. As the condition progresses, symptoms may include:  · Chest pain.  ¨ The pain can be described as a crushing or squeezing in the chest, or a tightness, pressure, fullness, or heaviness in the chest.  ¨ The pain can last more than a few minutes or can stop and recur.  · Pain in the arms, neck, jaw, or back.  · Unexplained heartburn or indigestion.  · Shortness of breath.  · Nausea.  · Sudden cold sweats.  Less common symptoms of CAD in men  can include:  · Fatigue.  · Unexplained feelings of nervousness or anxiety.  · Weakness.  · Diarrhea.  · Sudden light-headedness.  How is this diagnosed?  Tests to diagnose CAD may include:  · ECG (electrocardiogram).  · Exercise stress test. This looks for signs of blockage when the heart is being exercised.  · Pharmacologic stress test. This test looks for signs of blockage when the heart is being stressed with a medicine.  · Blood tests.  · Coronary angiogram. This is a procedure to look at the coronary arteries to see if there is any blockage.  How is this treated?  The treatment of CAD may include the following:  · Healthy behavioral changes to reduce or control risk factors.  · Medicine.  · Coronary stenting. A stent helps to keep an artery open.  · Coronary angioplasty. This procedure widens a narrowed or blocked artery.  · Coronary artery bypass surgery. This will allow your blood to pass the blockage (bypass) to reach your heart.  Follow these instructions at home:  · Take medicines only as directed by your health care provider.  · Do not take the following medicines unless your health care provider approves:  ¨ Nonsteroidal anti-inflammatory drugs (NSAIDs), such as ibuprofen, naproxen, or celecoxib.  ¨ Vitamin supplements that contain vitamin A, vitamin E, or both.  · Manage other health conditions such as hypertension and diabetes as directed by your health care provider.  · Follow a heart-healthy diet. A dietitian can help to educate you about healthy food options and changes.  · Use healthy cooking methods such as roasting, grilling, broiling, baking, poaching, steaming, or stir-frying. Talk to a dietitian to learn more about healthy cooking methods.  · Follow an exercise program approved by your health care provider.  · Maintain a healthy weight. Lose weight as approved by your health care provider.  · Plan rest periods when fatigued.  · Learn to manage stress.  · Do not use any tobacco products,  including cigarettes, chewing tobacco, or electronic cigarettes. If you need help quitting, ask your health care provider.  · If you drink alcohol, and your health care provider approves, limit your alcohol intake to no more than 1 drink per day. One drink equals 12 ounces of beer, 5 ounces of wine, or 1½ ounces of hard liquor.  · Stop illegal drug use.  · Your health care provider may ask you to monitor your blood pressure. A blood pressure reading consists of a higher number over a lower number, such as 110 over 72, which is written as 110/72. Ideally, your blood pressure should be:  ¨ Below 140/90 if you have no other medical conditions.  ¨ Below 130/80 if you have diabetes or kidney disease.  · Keep all follow-up visits as directed by your health care provider. This is important.  Get help right away if:  · You have pain in your chest, neck, arm, jaw, stomach, or back that lasts more than a few minutes, is recurring, or is unrelieved by taking medicine under your tongue (sublingual nitroglycerin).  · You have profuse sweating without cause.  · You have unexplained:  ¨ Heartburn or indigestion.  ¨ Shortness of breath or difficulty breathing.  ¨ Nausea or vomiting.  ¨ Fatigue.  ¨ Feelings of nervousness or anxiety.  ¨ Weakness.  ¨ Diarrhea.  · You have sudden light-headedness or dizziness.  · You faint.  These symptoms may represent a serious problem that is an emergency. Do not wait to see if the symptoms will go away. Get medical help right away. Call your local emergency services (911 in the [place] Do not drive yourself to the hospital.   This information is not intended to replace advice given to you by your health care provider. Make sure you discuss any questions you have with your health care provider.  Document Released: 07/15/2015 Document Revised: 05/25/2017 Document Reviewed: 04/21/2015  [employer] Interactive Patient Education © 2017 [employer]        Diabetes and Foot Care  Diabetes may cause you to  have problems because of poor blood supply (circulation) to your feet and legs. This may cause the skin on your feet to become thinner, break easier, and heal more slowly. Your skin may become dry, and the skin may peel and crack. You may also have nerve damage in your legs and feet causing decreased feeling in them. You may not notice minor injuries to your feet that could lead to infections or more serious problems. Taking care of your feet is one of the most important things you can do for yourself.  Follow these instructions at home:  · Wear shoes at all times, even in the house. Do not go barefoot. Bare feet are easily injured.  · Check your feet daily for blisters, cuts, and redness. If you cannot see the bottom of your feet, use a mirror or ask someone for help.  · Wash your feet with warm water (do not use hot water) and mild soap. Then pat your feet and the areas between your toes until they are completely dry. Do not soak your feet as this can dry your skin.  · Apply a moisturizing lotion or petroleum jelly (that does not contain alcohol and is unscented) to the skin on your feet and to dry, brittle toenails. Do not apply lotion between your toes.  · Trim your toenails straight across. Do not dig under them or around the cuticle. File the edges of your nails with an emery board or nail file.  · Do not cut corns or calluses or try to remove them with medicine.  · Wear clean socks or stockings every day. Make sure they are not too tight. Do not wear knee-high stockings since they may decrease blood flow to your legs.  · Wear shoes that fit properly and have enough cushioning. To break in new shoes, wear them for just a few hours a day. This prevents you from injuring your feet. Always look in your shoes before you put them on to be sure there are no objects inside.  · Do not cross your legs. This may decrease the blood flow to your feet.  · If you find a minor scrape, cut, or break in the skin on your feet,  keep it and the skin around it clean and dry. These areas may be cleansed with mild soap and water. Do not cleanse the area with peroxide, alcohol, or iodine.  · When you remove an adhesive bandage, be sure not to damage the skin around it.  · If you have a wound, look at it several times a day to make sure it is healing.  · Do not use heating pads or hot water bottles. They may burn your skin. If you have lost feeling in your feet or legs, you may not know it is happening until it is too late.  · Make sure your health care provider performs a complete foot exam at least annually or more often if you have foot problems. Report any cuts, sores, or bruises to your health care provider immediately.  Contact a health care provider if:  · You have an injury that is not healing.  · You have cuts or breaks in the skin.  · You have an ingrown nail.  · You notice redness on your legs or feet.  · You feel burning or tingling in your legs or feet.  · You have pain or cramps in your legs and feet.  · Your legs or feet are numb.  · Your feet always feel cold.  Get help right away if:  · There is increasing redness, swelling, or pain in or around a wound.  · There is a red line that goes up your leg.  · Pus is coming from a wound.  · You develop a fever or as directed by your health care provider.  · You notice a bad smell coming from an ulcer or wound.  This information is not intended to replace advice given to you by your health care provider. Make sure you discuss any questions you have with your health care provider.  Document Released: 12/15/2001 Document Revised: 05/25/2017 Document Reviewed: 05/27/2014  [employer] Interactive Patient Education © 2017 [employer]

## 2019-12-27 NOTE — THERAPY
Physical Therapy Contact Note    Per chart review patient DCing to [facility] this afternoon. Will hold PT treatment for today and resume tomorrow if DC unsuccessful.    Shania Brown, PT, DPT  [phone]

## 2019-12-27 NOTE — DISCHARGE PLANNING
F/U with client at bedside to discuss IRF referral. Explained specifics of inpatient rehab, answered questions/concerns. Pt agreeable to admission. [place] is aware insurance has authorized IRF, potential transfer today. Pt gave permission to speak with spouse regarding d/c support. Provided [facility] contact information for any additional concerns.     Spoke by phone with spouse eTrrance. Explained specifics of inpatient rehab. She is agreeable to admission. She is currently working 7 days/week, will assist as able when [place] returns home.     Following for bed availability today [facility].

## 2019-12-27 NOTE — CARE PLAN
Problem: Safety  Goal: Will remain free from injury  Outcome: PROGRESSING AS EXPECTED     Problem: Infection  Goal: Will remain free from infection  Outcome: PROGRESSING AS EXPECTED     Problem: Venous Thromboembolism (VTW)/Deep Vein Thrombosis (DVT) Prevention:  Goal: Patient will participate in Venous Thrombosis (VTE)/Deep Vein Thrombosis (DVT)Prevention Measures  Outcome: PROGRESSING AS EXPECTED

## 2019-12-27 NOTE — PREADMISSION SCREENING NOTE
Pre-Admission Screening Form    Patient Information:   Name: Israel Aviles     MRN: [id number]       : 1965      Age: 54 y.o.   Gender: male      Race:  or  [4]       Marital Status:  [2]  Family Contact: Terrance MedinaCori        Relationship: Spouse [17]  Son [15]  Home Phone: [phone]           Cell Phone: [phone]  Advanced Directives: None  Code Status:  FULL  Current Attending Provider: Donny Mcnally M.D.  Referring Physician: Dr. Nichole Morel     Physiatrist Consult: Dr. Alejandro Bai       Referral Date: 2019  Primary Payor Source:  [facility]  Secondary Payor Source:      Medical Information:   Date of Admission to Acute Care [facility]2019  Room Number: T819/00  Rehabilitation Diagnosis: 05.4 Unilateral LE Below Knee Amputation (BKA)  Immunization History   Administered Date(s) Administered   • Hepatitis B Vaccine Recombivax (Adol/Adult) 2008   • Influenza (IM) Preservative Free 10/15/2010   • Influenza Vaccine H1N1 2010   • MMR Vaccine 2008   • TD Vaccine 2008   • Varicella Vaccine Live 2008     No Known Allergies  Past Medical History:   Diagnosis Date   • Diabetes (HCC)      Past Surgical History:   Procedure Laterality Date   • KNEE AMPUTATION BELOW Left 2019    Procedure: AMPUTATION, BELOW KNEE;  Surgeon: Koyb Zhao M.D.;  Location: SURGERY [facility];  Service: Orthopedics   • IRRIGATION & DEBRIDEMENT ORTHO Left 2019    Procedure: IRRIGATION AND DEBRIDEMENT, WOUND-FOOT, BIOLOGIC PLACEMENT, WOUND VAC PLACEMENT;  Surgeon: Charles Chopra M.D.;  Location: SURGERY [facility];  Service: Orthopedics     History Leading to Admission, Conditions that Caused the Need for Rehab (CMS):     Yanni Ingram M.D.   Resident   Family Medicine   H&P   Attested   Date of Service:  2019  5:18 AM               Attestation signed by  Donny Mcnally M.D. at 12/16/2019 11:51 AM   Patient seen and examined with resident.  Chart and orders reviewed.  Case discussed with resident.  Agree with current assessment and plan.     Problems:  1. Osteomyelitis, left foot  2. Acute dyspnea --- rule out PE, CHF with pulm edema  3. Chest pain --- rule out cardiac ischemia  4. Type 2 DM with hyperglycemia  5. Hyponatremia  6. Abnormal EKG  7. Diabetic foot ulcers, bilateral  8. Malaise & fatigue     HECTOR Mcnally MD         Expand All Collapse All      []Hide copied text    []Hover for details     FAMILY MEDICINE HISTORY AND PHYSICAL         PATIENT ID:    NAME:             Israel Aviles  MRN:               [id number]  YOB: 1965     Date of Admission: 12/16/2019      Attending: Sung Mcnally M.D.   Admitting  Resident: Yanni Ingram M.D. (PGY-2)        Primary Care Physician:  Mj Bai M.D.     CC:  Gangrenous Toes      HPI: Israel Aviles is a 54 y.o male with a history of DM2, HTN, PVD, CVA, and left foot ulcers with osteomyelitis who presents today with worsening grangrenous toes found to have osteomyelitis of first, second, third, and fifth toes. He was previously hospitalized in August of 2019 and found to have septic arthropathy, osteomyelitis of entire first metatarsal, proximal phalanx and sesamoids, and second medial metatarsal head osteomyelitis. Patient was advised to have BKA by ID and vascular surgery given the extent of the osteomyelitis, but the patient refused amputation and wanted long course of IV abx. Patient had PICC line placed 8/26/19 and patient was discharged with IV Daptomycin per ID and sensitivities. Patient and family do not remember when he completed course of IV abx, but they state they were compliant with course and patient continued to have wound care up until he went to [facility] three weeks ago. They state that in [place] he was hospitalized for shortness of breath for which he received  antibiotics. They state he did not have pneumonia yet he was being treated with antibiotics for 5 days. He was discharged and then [place] back to the [place] yesterday. The flight was 17 hours longs and he states that he has continued to feel short of breath with a dry cough. He notes BL LE swelling which he has had since he was in [place]. Patient also states that he occasionally takes insulin for diabetes, but not always. He states that he is compliant with his diabetic oral agents, but not usually take his insulin.      Patient endorses shortness of breath, cough, fevers, chills, leg pain  Patient denies chest pain, headaches, vision changes, nausea, vomiting, diarrhea, or constipation         ED Course: Patient admitted, tachycardic to 90-100s, RR 20-22. Foot Xray showing worsening left metatarsophalangeal joints, second, thirds and fifth metatarsals. CMP with Na of 125, chloride 91, glucose 366, BUN 23, calcium 8.4, albumin 2.9. CBC with normal WBC at 8.7, hgb of 10.1. UA with glucose >1000, protein 30. ECG with T wave changes in inferiolateral leads and elevated troponin of 64.          ASSESSMENT/PLAN: 54 y.o. male admitted for worsened osteomyelitis of left metatarsophalangeal joints, second, thirds and fifth metatarsals and elevated cardiac enzymes with ECG changes.     # New worsened osteomyelitis of first metatarsophalangeal joint, second, third, and fifth metatarsal heads  S/P IV Daptomycin through PICC line 8/2019 - completed IV abx course  Xray showing new fractures likely 2/2 suspected osteomyelitis  Xray showing osteolytic changes of first tarsometatarsal joint, second, third, ad fifth metatarsal heads   Lactic acid normal at 1.8   2/4 SIRS criteria with heart rate 90s-100s and RR of 22   - Admission to telemetry   - Consulted vascular surgery   - Started on Zosyn  - Pending blood cultures   - Pending ESR  - Pending CRP   - 500 cc bolus of NS   - Will consult ID   - IP consult to limb preservation services  -  IP consult to wound care      # Elevated troponin of 64   ECG with abnormal T waves in anteriolateral leads, prolonged QTc of 514   No chest pain, but SOB  - Consult cardiology  - Ordered echocardiogram   - Repeat troponin 9:30 am      # Shortness of breath   # Lower extremity pitting edema   Recent travel to [place]  Per the family should have been on plavix but not taking it   Echo in 2018 showing EF of 60% with normal LV function, no wall motion abnormality   Venous insufficiency vs recent travel vs new onset HF   - CTA chest rule out pulmonary embolus   - Pending BNP   - Pending echocardiogram  - Consult to cardiology      # DMII uncontrolled  # Complicated by BL foot ulcers and osteomyelitis  # Glucosuria   Patient on metformin and glipizide  States that he was also told to take insulin, takes it occasionally   - Will hold metformin and glipizide at this time   - Low dose Insulin sliding scale  - Hypoglycemia protocol  - Will give 5 units of lantus at this time   - Consult to dietary for diabetic education     # Pseudohyponatremia  Na of 125 with a BG of 336   Corrected Na of 131  - 500 cc NS bolus   - Repeat BMP      # Anemia   Hgb of 10.1 on admission  Likely anemia of chronic disease  - Consider workup by day team      #Core Measures   VTE PPx: lovenox   Abx: zosyn   Diet: NPO   Fluids: 500 cc bolus   Lines and Tube: PIV   Code Status: Full code           Yanni Zhao M.D.   Physician   Surgery Orthopedic   Consults   Signed   Date of Service:  12/16/2019 12:09 PM               Expand All Collapse All      []Hide copied text    []Hover for details  Date of Service: 12/16/19     CC: Left foot wound and ischemic second and third toes     HPI: Israel Aviles is a 54 y.o. male who presents with complaints of pain to left foot.  This started at least 6 to 7 months ago after wearing ill fitting shoes.  He had been seen by the limb preservation service and has been treated by   [facility] in the past.  He is undergone previous wound treatment and debridement but is refused amputations prior.  The pain is 4/10 and is described as achy.  The pain is made worse by weightbearing and made better by rest and immobilization.           Assessment:   1. Gangrene of toe of left foot (HCC)      2. Diabetic ulcer of left midfoot associated with type 2 diabetes mellitus, limited to breakdown of skin (HCC)      3. Hyponatremia      4. Hyperglycemia            We discussed the diagnosis and findings with the patient at length.  We reviewed possible non operative and operative interventions and the risks and benefits of these.  I would recommend at least debridement and amputation of the second and third toes as these are clearly gangrenous and have declared themselves.  He likely will need eventual transmetatarsal versus below-knee amputation given the other active wounds and the signs of osteomyelitis and multiple MTP joints including the first ray.  At this point we will await further medical work-up as patient also presented with chest pain.  Assuming this work-up is negative the patient could go to the operating room as early as today or Wednesday if he is not cleared today.  He had a chance to ask questions and all of these were answered to his satisfaction.           Plan:  To OR for amputation of second and third toes and wound debridement when medically cleared  If not cleared for surgery today does not need to be n.p.o. from surgical standpoint  Limb preservation service consult  Heel weightbearing to left lower extremity           Mahesh Segovia M.D.   Physician   Cardiology   Consults   Signed   Date of Service:  12/16/2019 12:10 PM               Expand All Collapse All      []Hide copied text    []Hover for details  Reason for Consult:  Asked by Dr Jan Mims M.D. to see this patient with cardiac enzyme elevation for preop  Patient's PCP: Mj Bai M.D.     CC:       Chief  Complaint   Patient presents with   • Weakness   • Shortness of Breath         HPI:       54 year old man with PMH DM2, HTN, PAD s/p angioplasty presents with toe gangrene and found osteomyelitis. Consult for preop evaluation given cardiac marker elevation and EKG changes.     Patient currently without symptoms. Denies chest pain, shortness of breath, palpitations, orthopnea, pnd, ext swelling. He says can walk at least 1 mile without stopping. He does not feel physically limited with exerting either with walking or climbing stairs.          EKG : personally reviewed by me sinus 99, poor R wave prog, TWI - new changes compared to 8/22/19     TTE 12/16/19  CONCLUSIONS  Severely reduced left ventricular systolic function. Left ventricular   ejection fraction is visually estimated to be 30-35%.   There appears to be a linear echodensity which is adjacent to the LV   apex concerning for possible small LV thrombus. May consider repeat   limted echo in 1-2 days for re-evaluation if clinically indicated.    Normal inferior vena cava size and inspiratory collapse.  Indeterminate diastolic function.  Aortic sclerosis without stenosis.  Dr. Segovia is aware of the above findings.     Assessment / Plan:     54 year old man with PMH DM2, HTN, PAD s/p angioplasty presents with toe gangrene and found osteomyelitis. Consult for preop evaluation given cardiac marker elevation and EKG changes. On workup, found new HFrEF with WMA in LAD distribution and possible LV thrombus.     -check diagnostic LHC today  -cont antiplatelets unless interfere with surgical plans  -hep gtt for 48h  -repeat limited TTE in 2 days to eval for LV thrombus with definity echo contrast - - if still indeterminate will need cardiac MRI  -with respect to surgery, he is not yet optimized and pending LHC as well as medical optimization. However, we plan to sustain through any surgical plans. The medical and surgical team should still aim to cure the  osteomyelitis as necessary. If possible, anesthesia should attempt to avoid general sedation. Timing and type of intervention pending results and further conversation with med/surg teams.  -please start coreg 3.125mg bid  -please start valsartan 40mg bid - - will aim to titrate up as tolerated for easy conversion to outpatient entresto  -tomorrow will start spironolactone 25mg daily     I personally discussed his case with  Dr Dali M.D.     It is my pleasure to participate in the care of Mr. Aviles.  Please do not hesitate to contact me with questions or concerns.     Mahesh Segovia MD  Cardiologist [facility]              Aleksander Chatterjee M.D.   Physician   Surgery Vascular   Consults   Signed   Date of Service:  12/16/2019  6:03 PM               Expand All Collapse All      []Hide copied text    []Hover for details             Date of Service  12/16/2019     Reason For Consult  L foot osteomyelitis, necrotic toes     Requesting Physician  Donny Mcnally MD     Consulting Physician  Aleksander Chatterjee M.D.     Primary Care Physician  Mj Bai M.D.     Chief Complaint  Chest pain, SOB     History of Present Illness  Mr. Aviles is well-known to me with his history of chronic left foot wounds and osteo. He developed the wounds last spring and I was able to open the tibials and the DP percutaneously. His wounds initially did well and started to heal but he was not compliant with wound care or follow-up and they eventually started to deteriorate again this fall. Several months ago, we recommended a BKA but he declined any sort of amp.     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     He had a cardiac cath showing diffuse severe three-vessel disease, severely depressed EF,  ischemic cardiomyopathy and possible LV thrombus. He is not yet revascularized from a coronary standpoint.         Imaging Reviewed Today:  I personally reviewed all non-invasive vascular testing including images, x-rays, tracings, arterial waveforms, and duplex exams relevant to this admission. My interpretation is below:     Last vascualar testing was 8/2019.     L heart cath reviewed: findings noted in HPI     Assessment/Plan & Medical Decision-Making     Mr. Aviles presents with severe multivessel CAD and progression of his L foot osteo with gangrene of the 2nd/3rd toes. He was revascularized several months ago and we recommended an amputation at that time when flow was optimized.      No further vascular intervention planned at this point.      Will defer to ortho for amp.     Aleksander Chatterjee MD  Vascular Surgeon    TRINIDAD Ramos.   Nurse Practitioner   Family Medicine   Consults   Cosign Needed Addendum   Date of Service:  12/19/2019  5:30 PM               Cosign Needed Addendum        Expand All Collapse All      []Hide copied text    []Hover for details  LIMB PRESERVATION SERVICE CONSULT     Referred by Dr. Ingram     DATE OF CONSULTATION: 12/19/2019     REASON FOR CONSULT: Left foot       HISTORY OF PRESENT ILLNESS: Israel Aviles is a 54 y.o.  with a past medical history that includes type 2 diabetes, PVD, chronic ulcerations with osteomyelitis, admitted 12/16/2019 for Osteomyelitis (HCC).   [facility] has been consulted for left dorsal foot ulcer x2, left second toe, left third toe, left fifth MTH ulcer.    Well-known to LPS.  See previous consult note for further detail.  Patient developed blisters in March 2019 to left foot after wearing leather shoes while visiting [place].  Started wound care at [facility] in May 2019.  Once arterial studies were completed.  He was encouraged to go to the hospital for critical limb ischemia however he did not present until several days later.  Underwent  angioplasty of left AT, PT by Dr. Chatterjee followed by left foot I&D with application of epi-fix by Dr. Chopra in June 2019.  He returned to the wound clinic for continuation of skin substitute and wound VAC therapy however ulcers continued to worsen as his lower extremity perfusion worsened.  It was recommended he have BKA but patient refused.  He was referred for hyperbaric oxygen therapy.  Patient went out of town.  When he returned in August he had worsening infection and cellulitis to his leg.  He was encouraged to go to the hospital and presented the next day.  Again it was recommended he undergo BKA but he refused.  He discharged with home health.  Went to [place] in October 2019 for foot infection.  BKA was recommended, but he refused.  Patient went to [facility] for the next 2 months and became seriously ill beginning of December 2019 where he was in the hospital for 2 weeks.  He discharged from the hospital in [facility], [place] the [place] and proceeded to the ED for further care.  Patient was found to have poor ejection fraction of 35% and obstructive multivessel CAD.     IV antibiotics were started on this admission.  Infectious diseases has been consulted.    Xray completed and is positive for osteomyelitis.  Ortho involved     Diagnosed with diabetes 15 years ago, and is currently managing with oral therapy.  Checks blood sugars daily, average 110. Has had previous diabetes education.  Does not have numbness to feet.  Checks feet routinely.                ASSESSMENT AND PLAN:   Chronic left foot ulcerations complicated by diabetes, PVD, osteomyelitis; left second and third toe gangrene.  Failed multiple modalities for limb salvage.  Patient now agreeable to BKA.  -Plan for CABG x3 once patient has had BKA per cardiology.     Wound care:   Dry gauze dressing to left dorsal foot ulcers  Left second and third toe: Open air  Left fifth MTH ulcer: Open to air        Imaging/Labs:   No further imaging or  labs at this time     Vascular status: +2 popliteal to LLE, unable to palpate PT, faint palpable DP     Surgery: N.p.o. at midnight for left BKA with Dr. Zhao tomorrow     Antibiotics: Continue IV antibiotics.  Infectious disease involved.     Weight Bearing Status: WBAT to LLE.  Patient to be nonweightbearing postop.        PT/OT Consult: Involved.  Consult postop     Diabetes Education: A1c this admission 8.5%.  Involved     Discharge Plan:  Recommend rehab versus SNF     D/W: Patient, son, RN, Dr. Sepulveda, Dr. Zhao, Dr. Maria Teresa Zhao M.D.   Physician   Surgery Orthopedic   OP Report   Unsigned Transcription   Date of Service:  12/20/2019 12:00 AM            (suggestion)   Draft: Not electronically signed.           []Hide copied text    []Hover for details  DATE OF SERVICE:  12/20/2019     PREOPERATIVE DIAGNOSES:  Left foot osteomyelitis, chronic wounds, and failed   salvage procedures.     POSTOPERATIVE DIAGNOSES:  Left foot osteomyelitis, chronic wounds, and failed   salvage procedures.     PROCEDURE PERFORMED:  Left below-knee amputation.     SURGEON:  Koby Zhao MD     ASSISTANT:  Reinier Connors PA-C     ANESTHESIA:  General.     ESTIMATED BLOOD LOSS:  25 mL     COMPLICATIONS:  None.     OPERATIVE INDICATIONS:  The patient is a 54-year-old gentleman who has had a   history of wounds to the left foot for an extended period of time.  He has   undergone attempts at debridement and wound care without resolution to these.    Most recently, imaging has shown these extending into the midfoot with   osteomyelitis.  His last culture results came back with multidrug resistant   organisms.  In this setting, we discussed this issue and discussed treatment   options.  We recommended below-knee amputation as a way to remove the source   of his infection and give him a better chance of healing and mobilizing and   returning to his normal life.  We discussed that with this procedure, there    are risks including phantom limb pain, wound healing complications, stiffness,   possible need for revision surgeries in the future, continued risk of   infection as well as risk of loss of mobility.  He has a complicating factor   of significant coronary artery disease.  Given this high risk, we will likely   do this entire procedure in one setting versus a staged multistep amputation   for infection.  He understood these risks and agreed he wished to proceed.     PROCEDURE IN DETAIL:  The patient was seen in the preoperative holding on date   of surgery.  Left leg was marked with surgeon's initials.  He was then taken   to the operating room and placed supine on operative table.  Anesthesia was   induced.  Left lower extremity was prepped and draped in normal sterile   fashion.  Operative pause was conducted to verify correct patient, site, side,   procedure as well as surgeon's initials on the operative extremity ____.     DICTATION ENDS HERE        ____________________________________     MD Koby Lawrence M.D.   Physician   Surgery Orthopedic   OP Report   Unsigned Transcription   Date of Service:  12/20/2019 12:00 AM            (suggestion)   Draft: Not electronically signed.           []Hide copied text    []Hover for details  DATE OF SERVICE:  12/20/2019     ADDENDUM     PROCEDURE IN DETAIL:  The patient was seen in the preoperative holding on date   of surgery.  The left leg was marked with surgeon's initials.  He was then   taken to the operating room and placed supine on operating table.  Anesthesia   was induced.  The left lower extremity was then prepped and draped in normal   sterile fashion.  An operative pause was conducted.  Correct patient, site,   side, and procedure were identified as well as surgeon's initials on operative   extremity.  The foot was placed into an impervious stockinette with Coban.    We then reprepped the remainder of the leg to create a clean  uncontaminated   field.  At this point, we marked out our incisions for amputation.  These were   carried through the skin with knife and cautery.  The anterior muscular   compartment was divided.  We elevated the fascia and periosteum off of the   tibia.  A tibial cut was made and the anterior edge bevelled.  Next, roughly a   centimeter proximal to this, the fibular cut was made as well.  At this   point, then we used an amputation knife to dissect through the deep posterior   compartment and leave a long muscular flap for closure.  This was then sharply   transected at the distal end of the posterior flap.     With the flap isolated, we then turned our attention to the neurovascular   bundles.  We isolated the posterior and anterior neurovascular bundles and    the vessels from their nerves.  These were doubly tied with silk   ties.  The nerves were placed on traction and cut and allowed to retract into   the wound.  The sciatic nerve was also injected with local anesthetic prior to   cutting this.  We also identified other additional nerves such as the sural   nerve that were retracted from the flap then cut.  At this point, we then   tailored our posterior flap.  We had to debulk some of the deep musculature   including some of the soleus for closure.  Once this was debulked and the flap   was shaped for closure, we then closed in a layered fashion over a deep   Hemovac drain.  The tourniquet was let down prior to wound closure.  There was   no additional bleeding other than a few small venous muscular bleeders, which   were cauterized.  We then closed the deep fascias that were muscular flaps.    We then closed the subcutaneous tissues with interrupted Vicryl suture.  Skin   was closed with interrupted nylon suture.  A compressive dressing was then   placed to the stump.  The patient was also placed into a knee immobilizer to   maintain extension.  He was allowed to awaken in the operating room, taken  to   PACU in stable condition.     POSTOPERATIVE PLAN:  He will be nonweightbearing to the left lower extremity.    Knee immobilizer should be used at rest to maintain extension.  He can take   this off to work on knee range of motion.  Dressing changes will be as needed.    The drain will be removed if less than 20 mL of output per shift.  The   patient will follow up in 3 weeks' time for wound check and suture removal.    It will likely be 2-3 months before he is able to be fitted for a prosthesis   once the wound is fully healed.        ____________________________________     MD Tanvir Lawrence M.D.   Physician   Surgery Cardiac   Consults   Signed   Date of Service:  12/20/2019 11:49 AM               Expand All Collapse All      []Hide copied text    []Hover for details     REFERRING PHYSICIAN: Mahesh Segovia MD.      CONSULTING PHYSICIAN: Tanvir Mendoza MD, FACS.     CHIEF COMPLAINT: shortness of breath     HISTORY OF PRESENT ILLNESS: The patient is a 54 y.o. male with a history of diabetes mellitus type 2, hypertension, peripheral vascular disease, status post CVA, and left foot ulcers with osteomyelitis.  He presented to the emergency room on December 16 with a complaint of shortness of breath for the past 3 to 4 weeks.  The patient had just returned from [place] he had just been off the plane only 3 hours when he came to the emergency room.  In [place] he was asked also hospitalized with the same symptoms.  He has had diabetic foot ulcers and nonhealing wounds.  He was treated for osteomyelitis with a PICC line but at that time he refused amputation.  While he was in [place] he was also given antibiotics imipenem according to the patient's son.  He denies chest pain.  He has no other associated signs or symptoms.  No aggravating or alleviating factors.  While in the emergency room he was admitted for his gangrenous toes.  All up on the floor and echo was done for his shortness of breath  which was abnormal this prompted an angiogram.  I have been asked to evaluate him for consideration of coronary artery bypass grafting.         IMPRESSION: 54-year-old diabetic male who presented with lower extremity gangrene in need of amputation.  Preoperative evaluation included left heart catheterization with angiogram.  Angiogram revealed severe three-vessel coronary artery disease but very poor distal targets for bypass.  Especially in the distribution of the left anterior descending there is no bypassable target.  In addition, the patient has extensive collateral circulation in his coronary arteries.  Therefore I believe the patient is not a candidate for coronary artery bypass grafting, the risks do not justify the benefit.     PLAN:  I recommend guideline directed optimal medical therapy of his coronary artery disease.  No surgical intervention on his coronary arteries.     The STS mortality risk score is 5% and the morbidity and mortality risk score is 30%. The scores were discussed with patient.     Findings and recommendations have been discussed with the patient’s cardiologist Sachin Aquino.  Thank you for this very challenging consultation and participation in the patient’s care.  I will keep you apprised of all future developments.       Sincerely,         Tanvir Mendoza MD, FACS.     Lacy Spivey R.N.   Wound Team      Wound Team   Addendum   Date of Service:  12/24/2019  1:10 PM               Addendum             []Hide copied text    []Hover for details  LIMB PRESERVATION SERVICE   POD #4 left BKA by Dr. Zhao dressing changed, see below. Nursing to follow for QOD dressing changes:  Ace wrap and knee immobilizer reapplied after dressing changed. Dressing orders written for nursing.        Incision 12/20/19 Leg (Active)   Wound Image       Site Assessment Closed approximated incision     Angelica-wound Assessment Clean;Dry;Intact     Margins Attached edges     Closure Surface sutures;Approximated      Drainage Amount Scant     Drainage Description Serosanguineous     Treatments Cleansed     Periwound Protectant Not Applicable     Dressing Options Nonadherent Contact Layer;Dry Gauze;Roll Gauze;Tape     Dressing Changed New     Dressing Status Clean;Dry;Intact     Dressing Change Frequency Every 48 hrs     NEXT Dressing Change  12/26/19     NEXT Weekly Photo (Inpatient Only) 12/31/19     WOUND NURSE ONLY - Odor None     WOUND NURSE ONLY - Exposed Structures None     WOUND NURSE ONLY - Tissue Type and Percentage closed approximated incision                    Oliverio Bai M.D.   Physician   Physical Medicine & Rehab   Consults   Signed   Date of Service:  12/26/2019  4:40 PM            Consult Orders   IP CONSULT FOR PHYSIATRY [[id number]] ordered by Nichole Morel M.D. at 12/26/19 1554          Expand All Collapse All      []Hide copied text    []Marciano for details  Medical chart review completed.      Patient is a 54 y.o. year-old male with a past medical history significant for DM, HTN, CAD, HLD and Hx of diabetic foot ulcer  admitted to [facility] on 12/16/2019  3:11 AM with worsening gangrene to the left foot. Patient was previously seen in 8/2019 at which time he was suggested to have the BKA but patient was discharge on IV antibiotics.  Patient reportedly completed his IV antibiotics but developed worsening symptoms as well as shortness of breath.  Orthopedic surgeon recommended BKA and Cardiology was consulted for elevated cardiac enzymes with concern for NSTEMI and found to have a new EF of 30-35%.  Cardiology optimized his medication and then he underwent L BKA on 12/20/19 with Dr. Zhao.  ID and Cardiology have since signed off the case and patient is no longer on antibiotics. Patient's hemoglobin has been stable in the 9's.      Patient was last evaluated by OT on 12/26/19 and was Hari for ADLs.  Patient was last evaluated by PT on 12/24/19 and was Hari for gait. Patient was  previously living in a 1 story home with 1 step to enter; living with spouse who works days.            ALLERGIES:  Patient has no known allergies.     PSYCHOSOCIAL HISTORY:  Living Site:  Home  Living With:  spouse  Caregiver's availability:  Not Applicable  Number of stairs:  1  Substance use history:  Unknown        The patient presents functional deficits in mobility and self-care, and Minimal  de-conditioning. Pre-morbidly, this patient lived in a single level home with One steps to enter,with spouse  The patient was evaluated by acute care Physical Therapy and Occupational Therapy; currently requiring minimal assistance for mobility and minimal assistance for ADLs. The patient's current diet is Regular with Thin liquids.      Patient underwent new left BKA. Patient would benefit from IRF pending insurance authorization with [facility]. The patient is   a Very Good candidate for an acute inpatient rehabilitation program with a coordinated program of care at an intensity and frequency not available at a lower level of care.      Note: This recommendation requires that patient has at least CGA/Minimal Assistance needs in at least two therapy disciplines.  If patient progresses to no longer need CGA/Camilla with at least two therapy disciplines they may be more appropriate for Skilled nursing facility versus home with home health.       This recommendation is substantiated by the patient's current medical condition with intervention and assessment of medical issues requiring an acute level of care for patient's safety and maximum outcome. A coordinated program of care will be provided by an interdisciplinary team including physical therapy and occupational therapy. Rehab goals include improved mobility, self-care management, strength and conditioning/endurance, pain management, bowel and bladder management, mood and affect, and safety with independent home management including caregiver training. Estimated length of stay is  approximately 7-14 days. Rehab potential: Very Good. Disposition: to pre-morbid independent living setting with supportive care of patient's family. We will continue to follow with you in anticipation of discharge to acute inpatient rehabilitation when medically stable to do so at the discretion of the attending physician. Thank you for allowing us to participate in this patient's care. Please call with any questions regarding this recommendation.     Oliverio Bai M.D.                Co-morbidities: See above  Potential Risk - Complications: Contractures, Deep Vein Thrombosis, Malnutrition, Pain, Perceptual Impairment, Pneumonia, Pressure Ulcer and Infection  Level of Risk: High    Ongoing Medical Management Needed (Medical/Nursing Needs):   Patient Active Problem List    Diagnosis Date Noted   • Osteomyelitis of left foot ([facility]) 08/21/2019     Priority: High   • Peripheral arterial disease ([facility]) 06/21/2019     Priority: Low   • Normocytic anemia 08/21/2019   • Diabetic foot ([facility]) 08/21/2019   • Pressure injury of deep tissue of left foot 07/30/2019   • Diabetic ulcer of toe of left foot associated with type 2 diabetes mellitus, with necrosis of muscle ([facility]) 07/30/2019   • Diabetic foot ulcer ([facility]) 07/07/2019   • Diabetic polyneuropathy associated with type 2 diabetes mellitus ([facility]) 07/07/2019   • Wound infection 05/24/2019   • Uncontrolled type 2 diabetes mellitus with hyperglycemia ([facility]) 05/14/2019   • Hyperlipidemia 05/14/2019   • Acute ischemic stroke ([facility]) 06/15/2018     Mariam Mobley R.N.   Registered Nurse      Progress Notes   Signed   Date of Service:  12/27/2019  5:19 AM                    Monitor summary  SR-ST   .12/.08/.32        Current Vital Signs:   Temperature: 36.1 °C (97 °F) Pulse: 96 Respiration: 18 Blood Pressure: 107/69  Weight: 75 kg (165 lb 5.5 oz) Height: 182.9 cm (6')  Pulse Oximetry: 98 % O2 (LPM): 0      Completed Laboratory Reports:  Recent Labs      19  1801 19  0638 19  1204 19  1658 19  1926 19  0540 19  1010 19  1700 19  2114   POCGLUCOSE 145* 80 162* 130* 108* 123* 156* 186* 224*     Additional Labs: Not Applicable    Prior Living Situation:   Housing / Facility: 1 Story House  Steps Into Home: 1  Steps In Home: 0  Lives with - Patient's Self Care Capacity: Spouse  Equipment Owned: None    Prior Level of Function / Living Situation:   Physical Therapy: Prior Services: None  Housing / Facility: 1 Story House  Steps Into Home: 1  Steps In Home: 0  Rail: None  Equipment Owned: None  Lives with - Patient's Self Care Capacity: Spouse  Bed Mobility: Independent  Transfer Status: Independent  Ambulation: Independent  Assistive Devices Used: None  Stairs: Independent  Current Level of Function:   Level Of Assist: Minimal Assist(for safety)  Assistive Device: Front Wheel Walker  Distance (Feet): 20  Deviation: Step To  # of Stairs Climbed: 0  Weight Bearing Status: no restrictions; L BKA  Skilled Intervention: Compensatory Strategies, Tactile Cuing, Verbal Cuing  Supine to Sit: Minimal Assist  Sit to Supine: Supervised  Scooting: Supervised  Skilled Intervention: Verbal Cuing, Tactile Cuing  Sit to Stand: Minimal Assist  Bed, Chair, Wheelchair Transfer: Minimal Assist  Toilet Transfers: Supervised(off toilet without using grab bars)  Transfer Method: Other (Comments)(hop to gait with FWW)  Skilled Intervention: Verbal Cuing  Sitting in Chair: left in chair  Sitting Edge of Bed: 7-8 min total  Standin min  Occupational Therapy:   Self Feeding: Independent  Grooming / Hygiene: Independent  Bathing: Independent  Dressing: Independent  Toileting: Independent  Medication Management: Independent  Laundry: Independent  Kitchen Mobility: Independent  Finances: Independent  Home Management: Independent  Shopping: Independent  Prior Level Of Mobility: Independent Without Device in Community  Prior Services:  None  Housing / Facility: [address]  Current Level of Function:   Upper Body Dressing: Supervision  Lower Body Dressing: Supervision(pt donned underwear EOB then supine to pull up)  Toileting: Supervision  Skilled Intervention: Verbal Cuing  Speech Language Pathology:      Rehabilitation Prognosis/Potential: Good  Estimated Length of Stay: 7-14 days    Nursing:   Orientation : Oriented x 4  Continent    Scope/Intensity of Services Recommended:  Physical Therapy: 1.5 hr / day  5 days / week. Therapeutic Interventions Required: Maximize Endurance, Mobility, Strength and Safety  Occupational Therapy: 1.5 hr / day 5 days / week. Therapeutic Interventions Required: Maximize Self Care, ADLs, IADLs and Energy Conservation  Rehabilitation Nursin/7. Therapeutic Interventions Required: Monitor Pain, Skin, Wound(s), Vital Signs, Intake and Output, Labs, Safety, Family Training and LLE surgical incision care; DVT Prophylaxis; Infection control; Bowel & Bladder regimen; ADL's.  Rehabilitation Physician: 3 - 5 days / week. Therapeutic Interventions Required: Medical Management  Dietician: Consult. Therapeutic Interventions Required: Nutritional evaluation with recommendations to promote optimal health/healing.     Rehabilitation Goals and Plan (Expected frequency & duration of treatment in the IRF):   Return to the Community, Modified Independent Level of Care and Outpatient Support  Anticipated Date of Rehabilitation Admission: 2019  Patient/Family oriented IRF level of care/facility/plan: Yes  Patient/Family willing to participate in IRF care/facility/plan: Yes  Patient able to tolerate IRF level of care proposed: Yes  Patient has potential to benefit IRF level of care proposed: Yes  Comments: Not Applicable    Special Needs or Precautions - Medical Necessity:  Isolation Precautions: Special Contact Isolation  Safety Concerns/Precautions:  Fall Risk / High Risk for Falls, Balance and Bed / Chair Alarm  Complex  Wound Care: LLE BKA surgical incision care.  Pain Management  Weight-bearing Status: Non Weight Bearing, Left, Lower Extremity     Diet:   DIET ORDERS (From admission to next 24h)     Start     Ordered    12/22/19 1845  Diet Order Low Fiber(GI Soft), Diabetic  ALL MEALS     Question Answer Comment   Diet: Low Fiber(GI Soft)    Diet: Diabetic    Miscellaneous modifications: Vegetarian        12/22/19 1844    12/21/19 1134  Dietary Comment  ALL MEALS     Comments:  Please bring plastic trays and utensils. Pt is in an isolation room. Thanks    12/21/19 1134                Anticipated Discharge Destination / Patient/Family Goal:  Destination: Home with Assistance Support System: Spouse  Anticipated home health services: OT, PT and Nursing  Previously used HH service/ provider: Not Applicable  Anticipated DME Needs: To be determined  Outpatient Services: To be determined  Alternative resources to address additional identified needs:   Outpatient Orthopedic Follow-up - appt to be scheduled  Pre-Screen Completed: 12/27/2019 8:57 AM Helen Decker R.N.

## 2019-12-27 NOTE — CONSULTS
Medical chart review completed.     Patient is a 54 y.o. year-old male with a past medical history significant for DM, HTN, CAD, HLD and Hx of diabetic foot ulcer  admitted to [facility] on 12/16/2019  3:11 AM with worsening gangrene to the left foot. Patient was previously seen in 8/2019 at which time he was suggested to have the BKA but patient was discharge on IV antibiotics.  Patient reportedly completed his IV antibiotics but developed worsening symptoms as well as shortness of breath.  Orthopedic surgeon recommended BKA and Cardiology was consulted for elevated cardiac enzymes with concern for NSTEMI and found to have a new EF of 30-35%.  Cardiology optimized his medication and then he underwent L BKA on 12/20/19 with Dr. Zhao.  ID and Cardiology have since signed off the case and patient is no longer on antibiotics. Patient's hemoglobin has been stable in the 9's.     Patient was last evaluated by OT on 12/26/19 and was Hari for ADLs.  Patient was last evaluated by PT on 12/24/19 and was Hari for gait. Patient was previously living in a 1 story home with 1 step to enter; living with spouse who works days.     PMH:  Past Medical History:   Diagnosis Date   • Diabetes (HCC)        PSH:  Past Surgical History:   Procedure Laterality Date   • KNEE AMPUTATION BELOW Left 12/20/2019    Procedure: AMPUTATION, BELOW KNEE;  Surgeon: Koby Zhao M.D.;  Location: SURGERY [facility];  Service: Orthopedics   • IRRIGATION & DEBRIDEMENT ORTHO Left 6/24/2019    Procedure: IRRIGATION AND DEBRIDEMENT, WOUND-FOOT, BIOLOGIC PLACEMENT, WOUND VAC PLACEMENT;  Surgeon: Charles Chopra M.D.;  Location: SURGERY [facility];  Service: Orthopedics       FAMILY HISTORY:  History reviewed. No pertinent family history.    MEDICATIONS:  Current Facility-Administered Medications   Medication Dose   • carvedilol (COREG) tablet 6.25 mg  6.25 mg   • valsartan (DIOVAN) tablet 40 mg  40 mg   • metFORMIN (GLUCOPHAGE)  tablet 1,000 mg  1,000 mg   • glipiZIDE (GLUCOTROL) tablet 5 mg  5 mg   • ferrous sulfate tablet 325 mg  325 mg   • insulin glargine (LANTUS) injection 17 Units  17 Units    And   • glucose 4 g chewable tablet 16 g  16 g    And   • DEXTROSE 10% BOLUS 250 mL  250 mL   • oxyCODONE-acetaminophen (PERCOCET) 5-325 MG per tablet 1-2 Tab  1-2 Tab   • morphine (pf) 4 MG/ML injection 1 mg  1 mg   • atorvastatin (LIPITOR) tablet 80 mg  80 mg   • heparin injection 5,000 Units  5,000 Units   • clopidogrel (PLAVIX) tablet 75 mg  75 mg   • acetaminophen (TYLENOL) tablet 500 mg  500 mg   • spironolactone (ALDACTONE) tablet 25 mg  25 mg   • senna-docusate (PERICOLACE or SENOKOT S) 8.6-50 MG per tablet 2 Tab  2 Tab    And   • polyethylene glycol/lytes (MIRALAX) PACKET 1 Packet  1 Packet    And   • magnesium hydroxide (MILK OF MAGNESIA) suspension 30 mL  30 mL    And   • bisacodyl (DULCOLAX) suppository 10 mg  10 mg   • Respiratory Care per Protocol     • enalaprilat (VASOTEC) injection 1.25 mg  1.25 mg   • cloNIDine (CATAPRES) tablet 0.1 mg  0.1 mg   • aspirin EC (ECOTRIN) tablet 81 mg  81 mg       ALLERGIES:  Patient has no known allergies.    PSYCHOSOCIAL HISTORY:  Living Site:  Home  Living With:  spouse  Caregiver's availability:  Not Applicable  Number of stairs:  1  Substance use history:  Unknown      The patient presents functional deficits in mobility and self-care, and Minimal  de-conditioning. Pre-morbidly, this patient lived in a single level home with One steps to enter,with spouse  The patient was evaluated by acute care Physical Therapy and Occupational Therapy; currently requiring minimal assistance for mobility and minimal assistance for ADLs. The patient's current diet is Regular with Thin liquids.     Patient underwent new left BKA. Patient would benefit from IRF pending insurance authorization with [facility]. The patient is   a Very Good candidate for an acute inpatient rehabilitation program with a coordinated program  of care at an intensity and frequency not available at a lower level of care.     Note: This recommendation requires that patient has at least CGA/Minimal Assistance needs in at least two therapy disciplines.  If patient progresses to no longer need CGA/Camilla with at least two therapy disciplines they may be more appropriate for Skilled nursing facility versus home with home health.      This recommendation is substantiated by the patient's current medical condition with intervention and assessment of medical issues requiring an acute level of care for patient's safety and maximum outcome. A coordinated program of care will be provided by an interdisciplinary team including physical therapy and occupational therapy. Rehab goals include improved mobility, self-care management, strength and conditioning/endurance, pain management, bowel and bladder management, mood and affect, and safety with independent home management including caregiver training. Estimated length of stay is approximately 7-14 days. Rehab potential: Very Good. Disposition: to pre-morbid independent living setting with supportive care of patient's family. We will continue to follow with you in anticipation of discharge to acute inpatient rehabilitation when medically stable to do so at the discretion of the attending physician. Thank you for allowing us to participate in this patient's care. Please call with any questions regarding this recommendation.    Oliverio Bai M.D.

## 2019-12-27 NOTE — PROGRESS NOTES
[facility] FAM[facility] MEDICINE PROGRESS NOTE     Attending: Dr. Ladd  Resident: Teresa Canas ( PGY-1)  PATIENT: Israel Aviles; [id number]; 1965    ID: 54 y.o. male admitted for left lower extremity gangrene, LLE foot ulceration, subsequently found to have diffuse CAD by [facility], new heart failure    SUBJECTIVE: No acute events overnight. Patient resting in bed. In good spirits. Denies dizziness, CP, SOB, palpitations, fever chills. Hopefull for placement soon.     OBJECTIVE:     Vitals:    12/26/19 2101 12/26/19 2353 12/27/19 0501 12/27/19 0727   BP: 101/68 108/74 102/67 107/69   Pulse: 93 96 96 96   Resp: 18 18 18 18   Temp: 36.5 °C (97.7 °F) 36.1 °C (97 °F) 35.8 °C (96.5 °F) 36.1 °C (97 °F)   TempSrc: Temporal Temporal Temporal Temporal   SpO2: 97% 97% 97% 98%   Weight: 75 kg (165 lb 5.5 oz)      Height:           Intake/Output Summary (Last 24 hours) at 12/27/2019 0808  Last data filed at 12/27/2019 0501  Gross per 24 hour   Intake 260 ml   Output 2250 ml   Net -1990 ml       PE:  General: No acute distress, resting comfortably in bed.  HEENT: NC/AT. PERRLA. EOMI. MMM  Cardiovascular: RRR with no M/R/G.  Respiratory: Symmetrical chest. CTAB with no adventitious breath sounds   Abdomen: soft, NT/ND, no masses, +BS   EXT: Left BKA, cover in ACE wrap and knee brace   Neuro: non focal with no numbness, tingling or changes in sensation    LABS:  No results for input(s): WBC, RBC, HEMOGLOBIN, HEMATOCRIT, MCV, MCH, RDW, PLATELETCT, MPV, NEUTSPOLYS, LYMPHOCYTES, MONOCYTES, EOSINOPHILS, BASOPHILS, RBCMORPHOLO in the last 72 hours.  No results for input(s): SODIUM, POTASSIUM, CHLORIDE, CO2, BUN, CREATININE, CALCIUM, MAGNESIUM, PHOSPHORUS, ALBUMIN in the last 72 hours.  Estimated GFR/CRCL = Estimated Creatinine Clearance: 122.7 mL/min (by C-G formula based on SCr of 0.73 mg/dL).  Recent Labs     12/26/19  1010 12/26/19  1700 12/26/19  2114   POCGLUCOSE 156* 186* 224*                 No results for input(s): INR, APTT,  FIBRINOGEN in the last 72 hours.    Invalid input(s): DIMER    MICROBIOLOGY: reviewed    IMAGING: reviewed    MEDS:  Current medications reviewed     ASSESSMENT/PLAN:   54 y.o. male admitted for left lower extremity gangrene, LLE foot ulceration, subsequently found to have diffuse CAD by [facility], new heart failure.     # LLE gangrene, dry -s/p BKA  # LLE doral foot ulceration - s/p BKA  # Osteomyelitis - s/p BKA  # Cellulitis -s/p BKA  - 6/19 underwent left dorsalus pedis angioplasty, anterior tibial angio and atherectomy, left posterior tibial A angio   - 8/19: foot infection Cx + MSSA and e faecalis tx 6 weeks daptomycin,  - Recent trip to [facility] received linezolid and imipenem   - Surgery postponed 2/2 to NSTEMI/diffuse CAD and new HF, per cards medical optimization for ~ 1 week  - Stool PCR + carbapenmase  -12/20 patient underwent Left BKA   -ID and cardiology signed off.   -Physiatry saw patient yesterday recommending disposition to pre-morbid independent living setting with supportive care of patient's family      Plan:  No antiobiotics post BKA, ID signed off  Follow up ortho ~3 weeks as outpatient  Wound care per team   Contact precautions   Awaiting placement to post acute rebab     # NSTEMI   # Severe/diffuse 3 vessel CAD   On admission SOB elevated trop 64 and EKG with TWI, Echo demonstrating new heart failure, underwent [facility] 12/16 show duffuse/severe disease in LAD, LCx, and RCA.  No intervention was preformed.   Cardiology following   Per cardiothoracic surgery patient not candidate for CABG, recommend cardiac medical optimization  Having symptomatic hypotension; orthostatics negative and no other signs/sx of infection; concerned this is iatrogenic     Plan:   Telemetry   Continue Coreg, valsartan, spirolactone, asa, Plavix, atorvastatin with decreased doses of Coreg and valsartan due to symptomatic hypotension     #Heart Failure with reduced EF  - Likely 2/2 to diffuse CAD, PE work up negative  - ECHO on  12/16 showing new severely reduced LVF of 30-35%, BNP 6000 on admission, Repeat echo 12/18  EF 45%  - Cardiology following   - Patient with intermittent hypotension (as above); no evidence of infection. Concern that this is iatrogenic with new cardiac medications.      Plan:   Coreg 6.5mg BID, reduced from 12.5 mg BID due to hypotension  Valsartan 40 mg BID, reduced from 80 mg BID due to hypotension  Spironolactone 25 mg today  ASA, plavix, atorvastatin    Enrolled in HF program      # Possible LV Thrombus, resolved   Echo 12/16: demonstrating possible thrombus, heparin drip started 12/16, repeat echo 12/18 no evidence of thrombus.      # Anemia of chronic disease  # Iron Deficiency anemia  Denies melena  No hx of colonoscopy   Hgb back in August 2019 at 11  Iron studies showing TIB capacity 172, iron <10      Plan:  - continue iron supplement  Arrange colonoscopy with PCP after d/c     #Type 2 Diabetes  - A1C 8.5  - Goal BS <150 for infection control   -  yesterday (only BG recorded)     Plan:  Continue home metformin and glipizide  Lantus 17 units at bedtime  CTM and adjust as appropriate     #Hyponatremia - stable  - Correct Na of 129 on admission, has been stable ~130     #Constipation   Bowel protocol PRN     Dispo: Awaiting placement for post acute rehab placement     Core Measures:  Lines: PIV  Tubes: N/A  Diet: Diabetic  Abx: None indicated  DVT Ppx: SC heparin   GI Ppx: not indicated  PCP: Dr. Mj Bai  CODE STATUS: Full

## 2019-12-27 NOTE — DISCHARGE PLANNING
Physiatry consult complete. Dr. Bai recommending pt appropriate for acute inpatient rehab. Submitting case to [facility] for consideration of IRF level care. Will follow for auth determination. VM update to BUSHRA Duran [phone].

## 2019-12-27 NOTE — DISCHARGE PLANNING
Dr. Alejandro Bai will accept to acute rehab. Transport scheduled 3p today. Nursing to call report to [phone]. Bedside RN Javier aware. BUSHRA Coulter aware. Spouse Promila updated with transfer time. TCC will follow to assist as needed with transition to [facility].

## 2019-12-27 NOTE — DISCHARGE PLANNING
Anticipated Discharge Disposition: [facility]    Action: LSW spoke with Helen WOLF at [facility]. Pt is to be discharged at 1500 and transported to [facility]. LSW obtained verbal consent from the pt due to infectious medical condition. COBRA completed and given to bedside RN.     Barriers to Discharge: None    Plan: LSW will continue to assess for discharge needs.

## 2019-12-28 PROBLEM — I10 HTN (HYPERTENSION): Status: ACTIVE | Noted: 2019-12-28

## 2019-12-28 PROBLEM — I25.10 CAD (CORONARY ARTERY DISEASE): Status: ACTIVE | Noted: 2019-12-28

## 2019-12-28 PROBLEM — E55.9 VITAMIN D DEFICIENCY: Status: ACTIVE | Noted: 2019-12-28

## 2019-12-28 LAB
25(OH)D3 SERPL-MCNC: 20 NG/ML (ref 30–100)
ALBUMIN SERPL BCP-MCNC: 3.1 G/DL (ref 3.2–4.9)
ALBUMIN/GLOB SERPL: 0.6 G/DL
ALP SERPL-CCNC: 96 U/L (ref 30–99)
ALT SERPL-CCNC: 54 U/L (ref 2–50)
ANION GAP SERPL CALC-SCNC: 6 MMOL/L (ref 0–11.9)
AST SERPL-CCNC: 29 U/L (ref 12–45)
BASOPHILS # BLD AUTO: 0.5 % (ref 0–1.8)
BASOPHILS # BLD: 0.04 K/UL (ref 0–0.12)
BILIRUB SERPL-MCNC: 0.5 MG/DL (ref 0.1–1.5)
BUN SERPL-MCNC: 19 MG/DL (ref 8–22)
CALCIUM SERPL-MCNC: 8.9 MG/DL (ref 8.5–10.5)
CHLORIDE SERPL-SCNC: 99 MMOL/L (ref 96–112)
CO2 SERPL-SCNC: 27 MMOL/L (ref 20–33)
CREAT SERPL-MCNC: 0.77 MG/DL (ref 0.5–1.4)
EOSINOPHIL # BLD AUTO: 0.05 K/UL (ref 0–0.51)
EOSINOPHIL NFR BLD: 0.6 % (ref 0–6.9)
ERYTHROCYTE [DISTWIDTH] IN BLOOD BY AUTOMATED COUNT: 54.5 FL (ref 35.9–50)
GLOBULIN SER CALC-MCNC: 5.4 G/DL (ref 1.9–3.5)
GLUCOSE BLD-MCNC: 112 MG/DL (ref 65–99)
GLUCOSE BLD-MCNC: 137 MG/DL (ref 65–99)
GLUCOSE BLD-MCNC: 149 MG/DL (ref 65–99)
GLUCOSE BLD-MCNC: 159 MG/DL (ref 65–99)
GLUCOSE SERPL-MCNC: 138 MG/DL (ref 65–99)
HCT VFR BLD AUTO: 32.8 % (ref 42–52)
HGB BLD-MCNC: 10 G/DL (ref 14–18)
IMM GRANULOCYTES # BLD AUTO: 0.14 K/UL (ref 0–0.11)
IMM GRANULOCYTES NFR BLD AUTO: 1.7 % (ref 0–0.9)
LYMPHOCYTES # BLD AUTO: 3.58 K/UL (ref 1–4.8)
LYMPHOCYTES NFR BLD: 42.7 % (ref 22–41)
MCH RBC QN AUTO: 26.7 PG (ref 27–33)
MCHC RBC AUTO-ENTMCNC: 30.5 G/DL (ref 33.7–35.3)
MCV RBC AUTO: 87.7 FL (ref 81.4–97.8)
MONOCYTES # BLD AUTO: 0.67 K/UL (ref 0–0.85)
MONOCYTES NFR BLD AUTO: 8 % (ref 0–13.4)
NEUTROPHILS # BLD AUTO: 3.9 K/UL (ref 1.82–7.42)
NEUTROPHILS NFR BLD: 46.5 % (ref 44–72)
NRBC # BLD AUTO: 0 K/UL
NRBC BLD-RTO: 0 /100 WBC
PLATELET # BLD AUTO: 437 K/UL (ref 164–446)
PMV BLD AUTO: 9.1 FL (ref 9–12.9)
POTASSIUM SERPL-SCNC: 5 MMOL/L (ref 3.6–5.5)
PROT SERPL-MCNC: 8.5 G/DL (ref 6–8.2)
RBC # BLD AUTO: 3.74 M/UL (ref 4.7–6.1)
SODIUM SERPL-SCNC: 132 MMOL/L (ref 135–145)
TSH SERPL DL<=0.005 MIU/L-ACNC: 3.03 UIU/ML (ref 0.38–5.33)
WBC # BLD AUTO: 8.4 K/UL (ref 4.8–10.8)

## 2019-12-28 PROCEDURE — 99233 SBSQ HOSP IP/OBS HIGH 50: CPT | Performed by: PHYSICAL MEDICINE & REHABILITATION

## 2019-12-28 PROCEDURE — 82306 VITAMIN D 25 HYDROXY: CPT

## 2019-12-28 PROCEDURE — 770010 HCHG ROOM/CARE - REHAB SEMI PRIVAT*

## 2019-12-28 PROCEDURE — 85025 COMPLETE CBC W/AUTO DIFF WBC: CPT

## 2019-12-28 PROCEDURE — 97530 THERAPEUTIC ACTIVITIES: CPT

## 2019-12-28 PROCEDURE — 82962 GLUCOSE BLOOD TEST: CPT | Mod: 91

## 2019-12-28 PROCEDURE — 700102 HCHG RX REV CODE 250 W/ 637 OVERRIDE(OP): Performed by: PHYSICAL MEDICINE & REHABILITATION

## 2019-12-28 PROCEDURE — 36415 COLL VENOUS BLD VENIPUNCTURE: CPT

## 2019-12-28 PROCEDURE — 99223 1ST HOSP IP/OBS HIGH 75: CPT | Performed by: HOSPITALIST

## 2019-12-28 PROCEDURE — 97110 THERAPEUTIC EXERCISES: CPT

## 2019-12-28 PROCEDURE — 97166 OT EVAL MOD COMPLEX 45 MIN: CPT

## 2019-12-28 PROCEDURE — 80053 COMPREHEN METABOLIC PANEL: CPT

## 2019-12-28 PROCEDURE — 84443 ASSAY THYROID STIM HORMONE: CPT

## 2019-12-28 PROCEDURE — 97162 PT EVAL MOD COMPLEX 30 MIN: CPT

## 2019-12-28 PROCEDURE — A9270 NON-COVERED ITEM OR SERVICE: HCPCS | Performed by: PHYSICAL MEDICINE & REHABILITATION

## 2019-12-28 PROCEDURE — A9270 NON-COVERED ITEM OR SERVICE: HCPCS | Performed by: HOSPITALIST

## 2019-12-28 PROCEDURE — 700111 HCHG RX REV CODE 636 W/ 250 OVERRIDE (IP): Performed by: PHYSICAL MEDICINE & REHABILITATION

## 2019-12-28 PROCEDURE — 700102 HCHG RX REV CODE 250 W/ 637 OVERRIDE(OP): Performed by: HOSPITALIST

## 2019-12-28 RX ORDER — DEXTROSE MONOHYDRATE 25 G/50ML
50 INJECTION, SOLUTION INTRAVENOUS
Status: DISCONTINUED | OUTPATIENT
Start: 2019-12-28 | End: 2020-01-10 | Stop reason: HOSPADM

## 2019-12-28 RX ORDER — INSULIN GLARGINE 100 [IU]/ML
15 INJECTION, SOLUTION SUBCUTANEOUS EVERY EVENING
Status: DISCONTINUED | OUTPATIENT
Start: 2019-12-28 | End: 2019-12-29

## 2019-12-28 RX ADMIN — ASPIRIN 81 MG: 81 TABLET, COATED ORAL at 08:08

## 2019-12-28 RX ADMIN — INSULIN GLARGINE 15 UNITS: 100 INJECTION, SOLUTION SUBCUTANEOUS at 21:00

## 2019-12-28 RX ADMIN — METFORMIN HYDROCHLORIDE 1000 MG: 500 TABLET ORAL at 08:07

## 2019-12-28 RX ADMIN — VITAMIN D, TAB 1000IU (100/BT) 1000 UNITS: 25 TAB at 13:55

## 2019-12-28 RX ADMIN — FERROUS SULFATE TAB 325 MG (65 MG ELEMENTAL FE) 325 MG: 325 (65 FE) TAB at 08:05

## 2019-12-28 RX ADMIN — METFORMIN HYDROCHLORIDE 1000 MG: 500 TABLET ORAL at 17:28

## 2019-12-28 RX ADMIN — SPIRONOLACTONE 25 MG: 25 TABLET, FILM COATED ORAL at 06:15

## 2019-12-28 RX ADMIN — HEPARIN SODIUM 5000 UNITS: 5000 INJECTION, SOLUTION INTRAVENOUS; SUBCUTANEOUS at 06:17

## 2019-12-28 RX ADMIN — HEPARIN SODIUM 5000 UNITS: 5000 INJECTION, SOLUTION INTRAVENOUS; SUBCUTANEOUS at 21:25

## 2019-12-28 RX ADMIN — HEPARIN SODIUM 5000 UNITS: 5000 INJECTION, SOLUTION INTRAVENOUS; SUBCUTANEOUS at 13:47

## 2019-12-28 RX ADMIN — GLIPIZIDE 5 MG: 5 TABLET ORAL at 09:41

## 2019-12-28 RX ADMIN — VALSARTAN 40 MG: 80 TABLET, FILM COATED ORAL at 06:15

## 2019-12-28 RX ADMIN — OXYCODONE AND ACETAMINOPHEN 1 TABLET: 5; 325 TABLET ORAL at 13:47

## 2019-12-28 RX ADMIN — CLOPIDOGREL BISULFATE 75 MG: 75 TABLET ORAL at 21:25

## 2019-12-28 ASSESSMENT — BRIEF INTERVIEW FOR MENTAL STATUS (BIMS)
WHAT YEAR IS IT: CORRECT
BIMS SUMMARY SCORE: 15
INITIAL REPETITION OF BED BLUE SOCK - FIRST ATTEMPT: 3
ASKED TO RECALL SOCK: YES, NO CUE REQUIRED
ASKED TO RECALL BLUE: YES, NO CUE REQUIRED
WHAT MONTH IS IT: ACCURATE WITHIN 5 DAYS
ASKED TO RECALL BED: YES, NO CUE REQUIRED
WHAT DAY OF THE WEEK IS IT: CORRECT

## 2019-12-28 ASSESSMENT — ACTIVITIES OF DAILY LIVING (ADL): TOILETING: INDEPENDENT

## 2019-12-28 NOTE — FLOWSHEET NOTE
12/27/19 6579   Type of Assessment   Assessment Yes   Patient History   Pulmonary Diagnosis None   Surgical Procedures L BKA   Home O2 No   Home Treatments/Frequency No   COPD Risk Screening   Do you have a history of COPD? No   COPD Population Screener   During the past 4 weeks, how much did you feel short of breath? 0   Do you ever cough up any mucus or phlegm? 0   In the past 12 months, you do less than you used to because of your breathing problems 0   Have you smoked at least 100 cigarettes in your entire life? 0   How old are you? 1   COPD Screening Score 1   Smoking History   Have you ever smoked Never   Level Of Consciousness   Level of Consciousness Alert   Respiratory WDL   Respiratory (WDL) X   Chest Exam   Respiration 16   Pulse 96   Breath Sounds   RUL Breath Sounds Clear   RML Breath Sounds Clear   RLL Breath Sounds Clear   [place] Breath Sounds Clear   LLL Breath Sounds Clear   Secretions   Cough Non Productive   Oximetry   #Pulse Oximetry (Single Determination) Yes   Oxygen   Home O2 Use Prior To Admission? No   Pulse Oximetry 98 %   O2 Daily Delivery Respiratory  Room Air with O2 Available

## 2019-12-28 NOTE — PROGRESS NOTES
-----------Non-Face-to-Face------------  Prolonged non-face-to-face time was spent on  12/26/19 from 1605 to 1640 by this reviewer.  This time included medical chart review, medication review, and decision making for the appropriateness of transfer to inpatient rehabilitation.  Please see PM&R Chart Review Consult from 12/26/19 by this provider for detailed summation of the medical chart and the reasoning for current recommendations. In addition to the above, time was spent coordinating care with case management as well as transitional care coordination team in the acceptance and transfer of the patient to the inpatient rehabilitation facility setting.

## 2019-12-28 NOTE — ASSESSMENT & PLAN NOTE
HbA1c 8.5  Continue Metformin 1000 mg bid and Glipizide 10 mg bid  Will not need SSI on discharge  Outpt meds include Metformin 1000 mg bid and Glipizide 5 mg bid

## 2019-12-28 NOTE — ASSESSMENT & PLAN NOTE
S/P NSTEMI w/ Echo showing EF 45%  Has three vessel coronary artery disease per Cardiac Cath on 12/16/19 w/ recommendations for medical management  Off ARB, B-Bl, and diuretic due to hypotension and azotemia  Continue ASA, Plavix, and Lipitor  Outpt Cardiology F/U  Cautiously resume ACE-I or ARB, B-Bl, and Lasix/Aldactone as outpt

## 2019-12-28 NOTE — ASSESSMENT & PLAN NOTE
Off Valsartan, Coreg, and Aldactone for hypotension  Suspect pt's premorbid baseline BP's in the 90's range  Now tapered off Midodrine

## 2019-12-28 NOTE — H&P
REHABILITATION HISTORY AND PHYSICAL/POST ADMISSION PHYSICAL EVALUATION    Date of Admission: 12/27/2019  Israel Aviles  RH25/02    [facility] Code / Diagnosis to Support: 05.4 Unilateral LE Below Knee Amputation (BKA)  Etiologic Diagnosis: S/P BKA (below knee amputation) unilateral, left (HCC) from DM osteomyelitis    CC: Left leg amputation, phantom limb pain    HPI:  Patient is a 54 year-old male with a past medical history significant for DM, HTN, CAD, HLD and Hx of diabetic foot ulcer  admitted to [facility] on 12/16/2019  3:11 AM with worsening gangrene to the left foot. Patient was previously seen in 8/2019 at which time he was suggested to have the BKA but patient was discharge on IV antibiotics.  Patient reportedly completed his IV antibiotics but developed worsening symptoms as well as shortness of breath.  Orthopedic surgeon recommended BKA and Cardiology was consulted for elevated cardiac enzymes with concern for NSTEMI and found to have a new EF of 30-35%.  Cardiology optimized his medication and then he underwent L BKA on 12/20/19 with Dr. Zhao.  ID and Cardiology have since signed off the case and patient is no longer on antibiotics. Patient's hemoglobin has been stable in the 9's.      Patient was last evaluated by OT on 12/26/19 and was Hari for ADLs.  Patient was last evaluated by PT on 12/24/19 and was Hari for gait. Patient was previously living in a 1 story home with 1 step to enter; living with spouse who works days.     Patient tolerated transfer over. He reports his pain is controlled. He reports occasional cramping in the calf which might be phantom pain. Denies NVD. Denies SOB. Reports he was able to walk short distances with walker. Reports baseline poor sensation to feet. Denies easy bruising or bleeding.     REVIEW OF SYSTEMS:     Comprehensive 14 point ROS was reviewed and all were negative except as noted elsewhere in this document.     PMH:  Past Medical History:    Diagnosis Date   • Diabetes (HCC)        PSH:  Past Surgical History:   Procedure Laterality Date   • KNEE AMPUTATION BELOW Left 12/20/2019    Procedure: AMPUTATION, BELOW KNEE;  Surgeon: Koby Zhao M.D.;  Location: SURGERY [facility];  Service: Orthopedics   • IRRIGATION & DEBRIDEMENT ORTHO Left 6/24/2019    Procedure: IRRIGATION AND DEBRIDEMENT, WOUND-FOOT, BIOLOGIC PLACEMENT, WOUND VAC PLACEMENT;  Surgeon: Charles Chopra M.D.;  Location: SURGERY [facility];  Service: Orthopedics       FAMILY HISTORY:  No family history on file.   Family history of diabetes on maternal    MEDICATIONS:  Current Facility-Administered Medications   Medication Dose   • Respiratory Care per Protocol     • Pharmacy Consult Request ...Pain Management Review 1 Each  1 Each   • hydrALAZINE (APRESOLINE) tablet 25 mg  25 mg   • acetaminophen (TYLENOL) tablet 650 mg  650 mg   • senna-docusate (PERICOLACE or SENOKOT S) 8.6-50 MG per tablet 2 Tab  2 Tab    And   • polyethylene glycol/lytes (MIRALAX) PACKET 1 Packet  1 Packet    And   • magnesium hydroxide (MILK OF MAGNESIA) suspension 30 mL  30 mL    And   • bisacodyl (DULCOLAX) suppository 10 mg  10 mg   • artificial tears ophthalmic solution 1 Drop  1 Drop   • benzocaine-menthol (CEPACOL) lozenge 1 Lozenge  1 Lozenge   • mag hydrox-al hydrox-simeth (MAALOX PLUS ES or MYLANTA DS) suspension 20 mL  20 mL   • ondansetron (ZOFRAN ODT) dispertab 4 mg  4 mg    Or   • ondansetron (ZOFRAN) syringe/vial injection 4 mg  4 mg   • traZODone (DESYREL) tablet 50 mg  50 mg   • sodium chloride (OCEAN) 0.65 % nasal spray 2 Spray  2 Spray   • insulin glargine (LANTUS) injection 17 Units  17 Units    And   • glucose 4 g chewable tablet 16 g  16 g    And   • dextrose 50% (D50W) injection 50 mL  50 mL   • [START ON 12/28/2019] aspirin EC (ECOTRIN) tablet 81 mg  81 mg   • atorvastatin (LIPITOR) tablet 80 mg  80 mg   • carvedilol (COREG) tablet 6.25 mg  6.25 mg   • clopidogrel (PLAVIX) tablet 75  mg  75 mg   • [START ON 2019] ferrous sulfate tablet 325 mg  325 mg   • [START ON 2019] glipiZIDE (GLUCOTROL) tablet 5 mg  5 mg   • heparin injection 5,000 Units  5,000 Units   • metFORMIN (GLUCOPHAGE) tablet 1,000 mg  1,000 mg   • oxyCODONE-acetaminophen (PERCOCET) 5-325 MG per tablet 1-2 Tab  1-2 Tab   • [START ON 2019] spironolactone (ALDACTONE) tablet 25 mg  25 mg   • valsartan (DIOVAN) tablet 40 mg  40 mg       ALLERGIES:  Patient has no known allergies.    PSYCHOSOCIAL HISTORY:  Housing / Facility: 1 Story House  Steps Into Home: 1  Steps In Home: 0  Lives with - Patient's Self Care Capacity: Spouse  Equipment Owned: None     Prior Level of Function / Living Situation:   Physical Therapy: Prior Services: None  Housing / Facility: 1 Story House  Steps Into Home: 1  Steps In Home: 0  Rail: None  Equipment Owned: None  Lives with - Patient's Self Care Capacity: Spouse  Bed Mobility: Independent  Transfer Status: Independent  Ambulation: Independent  Assistive Devices Used: None  Stairs: Independent  Current Level of Function:   Level Of Assist: Minimal Assist(for safety)  Assistive Device: Front Wheel Walker  Distance (Feet): 20  Deviation: Step To  # of Stairs Climbed: 0  Weight Bearing Status: no restrictions; L BKA  Skilled Intervention: Compensatory Strategies, Tactile Cuing, Verbal Cuing  Supine to Sit: Minimal Assist  Sit to Supine: Supervised  Scooting: Supervised  Skilled Intervention: Verbal Cuing, Tactile Cuing  Sit to Stand: Minimal Assist  Bed, Chair, Wheelchair Transfer: Minimal Assist  Toilet Transfers: Supervised(off toilet without using grab bars)  Transfer Method: Other (Comments)(hop to gait with FWW)  Skilled Intervention: Verbal Cuing  Sitting in Chair: left in chair  Sitting Edge of Bed: 7-8 min total  Standin min  Occupational Therapy:   Self Feeding: Independent  Grooming / Hygiene: Independent  Bathing: Independent  Dressing: Independent  Toileting:  Independent  Medication Management: Independent  Laundry: Independent  Kitchen Mobility: Independent  Finances: Independent  Home Management: Independent  Shopping: Independent  Prior Level Of Mobility: Independent Without Device in Community  Prior Services: None  Housing / Facility: 1 Story House  Current Level of Function:   Upper Body Dressing: Supervision  Lower Body Dressing: Supervision(pt donned underwear EOB then supine to pull up)  Toileting: Supervision  Skilled Intervention: Verbal Cuing  Speech Language Pathology:      Rehabilitation Prognosis/Potential: Good  Estimated Length of Stay: 7-14 days    CURRENT LEVEL OF FUNCTION:   Same as level of function prior to admission to [facility]    PHYSICAL EXAM:     VITAL SIGNS:   height is 1.829 m (6') and weight is 78.6 kg (173 lb 4.5 oz). His oral temperature is 36.3 °C (97.4 °F). His blood pressure is 110/74 and his pulse is 95. His respiration is 18 and oxygen saturation is 98%.      GENERAL: No apparent distress  HEENT: Normocephalic/atraumatic, EOMI and PERRL  CARDIAC: Regular rate and rhythm, normal S1, S2   LUNGS: Clear to auscultation   ABDOMINAL: bowel sounds present, soft and nontender    EXTREMITIES: no contractures, spasticity, or edema.  No calf tenderness right. Left BKA site with anterior sutures. Edema well controlled  NEURO:  Mental status:  A&Ox4 (person, place, date, situation) answers questions appropriately  Speech: fluent, no aphasia or dysarthria  CRANIAL NERVES: CN 2-12 intact  Motor:  5/5 BUE  5/5 RLE, 4/5 L HF 4+/5 L KE   Sensory: intact    RADIOLOGY:                      TTE 12/18/19  CONCLUSIONS  Limited Exam for LV Apical Thrombus.   No thrombus. Mildly reduced left ventricular systolic function.  Left ventricular ejection fraction is visually estimated to be 45%.  There is apical septum and apical akinesis.     Compared to the Prior Echo on 12/16/19: The LVEF has improved from 35%   to 45%. There was no LV  thrombus visualized on either study.     CT Chest 12/16/19  IMPRESSION:     1.  No evidence of pulmonary embolus.     2.  Patchy bilateral infiltrates and small bilateral pleural effusions.     3.  Atherosclerotic vascular calcification.    LABS:         Pending Admission          PRIMARY REHAB DIAGNOSIS:    This patient is a 54 y.o. male admitted for acute inpatient rehabilitation with S/P BKA (below knee amputation) unilateral, left (HCC).    IMPAIRMENTS:   ADLs/IADLs  Mobility    SECONDARY DIAGNOSIS/MEDICAL CO-MORBIDITIES AFFECTING FUNCTION:  HTN  CAD  DM  HLD    RELEVANT CHANGES SINCE PREADMISSION EVALUATION:    Status unchanged    The patient's rehabilitation potential is Very Good  The patient's medical prognosis is good    PLAN:   Discussion and Recommendations:   1. The patient requires an acute inpatient rehabilitation program with a coordinated program of care at an intensity and frequency not available at a lower level of care. This recommendation is substantiated by the patient's medical physicians who recommend that the patient's intervention and assessment of medical issues needs to be done at an acute level of care for patient's safety and maximum outcome.   2. A coordinated program of care will be supplied by an interdisciplinary team of physical therapy, occupational therapy, rehab physician, rehab nursing, and, if needed, speech therapy and rehab psychology. Rehab team presents a patient-specific rehabilitation and education program concentrating on prevention of future problems related to accessibility, mobility, skin, bowel, bladder, sexuality, and psychosocial and medical/surgical problems.   3. Need for Rehabilitation Physician: The rehab physician will be evaluating the patient on a multi-weekly basis to help coordinate the program of care. The rehab physician communicates between medical physicians, therapists, and nurses to maximize the patient's potential outcome. Specific areas in which  the rehab physician will be providing daily assessment include the following:   A. Assessing the patient's heart rate and blood pressure response (vitals monitoring) to activity and making adjustments in medications or conservative measures as needed.   B. The rehab physician will be assessing the frequency at which the program can be increased to allow the patient to reach optimal functional outcome.   C. The rehab physician will also provide assessments in daily skin care, especially in light of patient's impairments in mobility.   D. The rehab physician will provide special expertise in understanding how to work with functional impairment and recommend appropriate interventions, compensatory techniques, and education that will facilitate the patient's outcome.   4. Rehab R.N.   The rehab RN will be working with patient to carry over in room mobility and activities of daily living when the patient is not in 3 hours of skilled therapy. Rehab nursing will be working in conjunction with rehab physician to address all the medical issues above and continue to assess laboratory work and discuss abnormalities with the treating physicians, assess vitals, and response to activity, and discuss and report abnormalities with the rehab physician. Rehab RN will also continue daily skin care, supervise bladder/bowel program, instruct in medication administration, and ensure patient safety.   5. Rehab Therapy: Therapies to treat at intensity and frequency of (may change after completion of evaluation by all therapeutic disciplines):       PT:  Physical therapy to address mobility, transfer, gait training and evaluation for adaptive equipment needs 1 and 1/2 hour/day at least 5 days/week for the duration of the ELOS (see below)       OT:  Occupational therapy to address ADLs, self-care, home management training, functional mobility/transfers and assistive device evaluation, and community re-integration 1 and 1/2 hour/day at least  5 days/week for the duration of the ELOS (see below).     Medical management / Rehabilitation Issues/ Adverse Potential as part of rehabilitation plan     REHABILITATION ISSUES/ADVERSE POTENTIAL:  1.  L BKA - Patient with 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 Zhao. Patient demonstrates functional deficits in strength, balance, coordination, and ADL's. Patient is admitted to [facility] for comprehensive rehabilitation therapy as described below.   Rehabilitation nursing monitors bowel and bladder control, educates on medication administration, co-morbidities and monitors patient safety.    2.  Neurostimulants: None at this time but continue to assess daily for need to initiate should status change.    3.  DVT prophylaxis:  Patient is on Heparin for anticoagulation upon transfer. Encourage OOB. Monitor daily for signs and symptoms of DVT including but not limited to swelling and pain to prevent the development of DVT that may interfere with therapies.    4.  GI prophylaxis:  On PO diet    5.  Pain: No issues with pain currently / Controlled with APAP/Oxycodone    6.  Nutrition/Dysphagia: Dietician monitors nutrient intake, recommend supplements prn and provide nutrition education to pt/family to promote optimal nutrition for wound healing/recovery.     7.  Bladder/bowel:  Start bowel and bladder program as described below, to prevent constipation, urinary retention (which may lead to UTI), and urinary incontinence (which will impact upon pt's functional independence).   - Post void bladder scans, I&O cath for PVRs >400  - up to commode after meal     8.  Skin/dermal ulcer prophylaxis: Monitor for new skin conditions with q.2 h. turns as required to prevent the development of skin breakdown.     9.  Cognition/Behavior:  Psychologist Dr. Dawkins provides adjustment counseling to illness and psychosocial barriers that may be potential barriers to rehabilitation.     10.  Respiratory therapy: RT performs O2 management prn, breathing retraining, pulmonary hygiene and bronchospasm management prn to optimize participation in therapies.     MEDICAL CO-MORBIDITIES/ADVERSE POTENTIAL AFFECTING FUNCTION:   L BKA - Patient with 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 Zhao on 12/20/19.  -PT and OT for mobility and ADLs  -NWB LLE.    DM - Patient on Glipizide 5 mg qAM, Metformin 1000 mg BID and SSI    HTN/CV/PAD - Patient on ASA 81 mg and Plavix 75 QPM. Patient with recent TTE with EF of 40%. Patient on Coreg 6.25 mg, Spironolactone 25 mg daily, and Valsartan 40 mg BID     HLD - Patient on Atorvastatin 80 mg QHS    Anemia - Patient on Fe supplement on transfer. Check AM CBC    DVT Ppx - Patient on Heparin on transfer. Monitor for ambulation.     I personally performed a complete drug regimen review and no potential clinically significant medication issues were identified.     Pt was seen today for 74 min, and entire time spent in face-to-face contact was >50% in counseling and coordination of care as detailed in A/P above.        GOALS/EXPECTED LEVEL OF FUNCTION BASED ON CURRENT MEDICAL AND FUNCTIONAL STATUS (may change based on patient's medical status and rate of impairment recovery):  Transfers:   Modified Independent  Mobility/Gait:   Modified Independent  ADL's:   Modified Independent    DISPOSITION: Discharge to pre-morbid independent living setting with the supportive care of patient's family.    ELOS: 7-10 days.

## 2019-12-28 NOTE — THERAPY
"Occupational Therapy   Initial Evaluation     Patient Name: Israel Aviles  Age:  54 y.o., Sex:  male  Medical Record #: [id number]  Today's Date: 12/28/2019     Subjective    Pt goes by [place] or [place]. English is pt's second language, however, pt and wife declined .    \"I don't know if I will be able to do all I was doing at work.\" -pt  \"You will. You will go back to doing everything you were doing before all of this, exactly the same.\" -pt's wife     Objective       12/28/19 0831   Prior Living Situation   Prior Services Home-Independent   Housing / Facility 1 Story House   Steps Into Home 2   Steps In Home 0   Rail None   Elevator No   Bathroom Set up Bathtub / Shower Combination;Walk In Shower  (walk in shower has a lip; no grab bars)   Equipment Owned None   Lives with - Patient's Self Care Capacity Spouse   Prior Level of ADL Function   Self Feeding Independent   Grooming / Hygiene Independent   Bathing Independent   Dressing Independent   Toileting Independent   Prior Level of IADL Function   Laundry Other (Comments)  (Wife completes this chore)   Kitchen Mobility Other (Comments)  (Wife completes this chore)   Shopping Other (Comments)  (Wife completes this chore)   Prior Level Of Mobility Independent Without Device in Community;Independent Without Device in Home   Driving / Transportation Driving Independent   Occupation (Pre-Hospital Vocational) Employed Full Time  (See comments)   Comments 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 This requires extensive standing, lifting heavy objects to transport to other locations, and driving to/from other locations.   IRF-JOSSUE:  Prior Functioning: Everyday Activities   Self Care Independent   Indoor Mobility (Ambulation) Independent   Stairs Independent   Functional Cognition Independent   Prior Device Use None of the given options   Pain   Intervention Declines   Cognition    Level of Consciousness Alert   IRF-JOSSUE:  Cognitive Pattern " "Assessment   Cognitive Pattern Assessment Used BIMS   IRF-JOSSUE:  Brief Interview for Mental Status (BIMS)   Repetition of Three Words (First Attempt) 3   Temporal Orientation: Able to Report the Correct Year Correct   Temporal Orientation: Able to Report the Correct Month Accurate within 5 days   Temporal Orientation: Able to Report the Correct Day of the Week Correct   Able to Recall \"Sock\" Yes, no cue required   Able to Recall \"Blue\" Yes, no cue required   Able to Recall \"Bed\" Yes, no cue required   BIMS Summary Score 15   Active ROM Upper Body   Active ROM Upper Body  WDL   Strength Upper Body   Upper Body Strength  WDL   Comments tested shoulder flexion/extension and internal/external rotation, elbow flexion/extansion   Sensation Upper Body   Upper Extremity Sensation  WDL   Balance Assessment   Sitting Balance (Static) Good   Sitting Balance (Dynamic) Good   Standing Balance (Static) Good   Standing Balance (Dynamic) Fair -   Bed Mobility    Supine to Sit Supervised   Scooting Supervised   Rolling Supervised   Coordination Upper Body   Coordination WDL   IRF-JOSSUE:  Eating   Assistance Needed Set-up / clean-up   [place] Physical Assistance Level No physical assistance or only touching/steadying assist   CARE Score 5   Discharge Goal:  Assistance Needed Independent   Discharge Goal:  Physical Assistance Level No physical assistance or only touching/steadying assist   Discharge Goal:  Score 6   IRF-JOSSUE:  Oral Hygiene   Assistance Needed Supervision  (seated)   Physical Assistance Level No physical assistance   CARE Score 4   Discharge Goal:  Assistance Needed Independent   Discharge Goal:  Physical Assistance Level No physical assistance or only touching/steadying assist   Discharge Goal:  Score 6   IRF-JOSSUE:  Shower/Bathe Self   Assistance Needed Incidental touching;Set-up / clean-up;Supervision   Physical Assistance Level Less than 25%   CARE Score 3   Discharge Goal:  Assistance Needed Independent   Discharge Goal: "  Physical Assistance Level No physical assistance or only touching/steadying assist   Discharge Goal Score 6   IRF-JOSSUE:  Upper Body Dressing   Assistance Needed Supervision;Set-up / clean-up   Physical Assistance Level No physical assistance or only touching/steadying assist   CARE Score 4   Discharge Goal:  Assistance Needed Independent   Discharge Goal:  Physical Assistance Level No physical assistance or only touching/steadying assist   Dischage Goal:  Score 6   IRF-JOSSUE:  Lower Body Dressing   Assistance Needed Incidental touching;Set-up / clean-up;Adaptive equipment   Physical Assistance Level No physical assistance or only touching/steadying assist   CARE Score 4   Discharge Goal:  Assistance Needed Independent   Discharge Goal:  Physical Assistance Level No physical assistance or only touching/steadying assist   Discharge Goal:  Score 6   IRF JOSSUE:  Putting On/Taking Off Footwear   Assistance Needed Supervision   Physical Assistance Level No physical assistance or only touching/steadying assist   CARE Score 4   Discharge Goal:  Assistance Needed Independent   Discharge Goal:  Physical Assistance Level No physical assistance or only touching/steadying assist   Discharge Goal:  Score 6   IRF-JOSSUE:  Toileting Hygiene   Assistance Needed Incidental touching;Set-up / clean-up;Supervision   Physical Assistance Level No physical assistance or only touching/steadying assist   CARE Score 4   Discharge Goal:  Assistance Needed Independent   Discharge Goal:  Physical Assistance Level No physical assistance or only touching/steadying assist   Discharge Goal:  Score 6   IRF-JOSSUE:  Toilet Transfer   Assistance Needed Incidental touching;Supervision;Set-up / clean-up;Adaptive equipment   Physical Assistance Level No physical assistance or only touching/steadying assist   CARE Score 4   Discharge Goal:  Assistance Needed Independent;Adaptive equipment   Discharge Goal:  Physical Assistance Level No physical assistance or only  touching/steadying assist   Discahrge Goal:  Score 6   IRF-JOSSUE:  Hearing, Speech, and Vision   Expression of Ideas and Wants 4   Understanding Verbal and Non-Verbal Content 4   Problem List   Problem List Decreased Active Daily Living Skills;Decreased Functional Mobility;Decreased Activity Tolerance;Impaired Postural Control / Balance   Current Discharge Plan   Current Discharge Plan Return to Prior Living Situation  (Pt's wife works night shifts)   Benefit    Therapy Benefit Patient Would Benefit from Inpatient Rehab Occupational Therapy to Maximize [place] with ADLs, IADLs and Functional Mobility.   Interdisciplinary Plan of Care Collaboration   IDT Collaboration with  Certified Nursing Assistant   Patient Position at End of Therapy Seated;Chair Alarm On;Self Releasing Lap Belt Applied;Call Light within Reach;Phone within Reach;Family / Friend in Room   Collaboration Comments re: CLOF and bandage for R heel    Equipment Needs   Assistive Device / DME Grab Bars By Toilet;Grab Bars In Shower / Tub;Tub Transfer Bench;Shower Chair;Front-Wheel Walker   Adaptive Equipment None   OT Total Time Spent   OT Individual Total Time Spent (Mins) 60   OT Charge Group   OT Evaluation OT Evaluation Mod       FIM Eating Score:  5 - Standby Prompting/Supervision or Set-up  Eating Description:       FIM Grooming Score:  5 - Standby Prompting/Supervision or Set-up  Grooming Description:  (brushing teeth; washing hands)    FIM Bathing Score:  4 - Minimal Assistance  Bathing Description:  Tub bench, Grab bar, Hand held shower(CGA)    FIM Upper Body Dressin - Standby Prompting/Supervision or Set-up  Upper Body Dressing Description:  ( tank top and long sleeve shirt)    FIM Lower Body Dressing Score:  4 - Minimal Assistance  Lower Body Dressing Description:  Increased time, Requires minimal contact(CGA; grab bar)    FIM Toileting Body Dressin - Minimal Assistance  Toileting Description:  Increased time, Supervision for  safety, Grab bar(CGA)    FIM Bed/Chair/Wheelchair Transfers Score: 4 - Minimal Assistance  Bed/Chair/Wheelchair Transfers Description:  Increased time, Supervision for safety, Set-up of equipment    FIM Toilet Transfer Score:  4 - Minimal Assistance  Toilet Transfer Description:  Grab bar, Increased time, Supervision for safety, Set-up of equipment(CGA)    FIM Tub/Shower Transfers Score:  4 - Minimal Assistance  Tub/Shower Transfers Description:  Grab bar, Increased time, Set-up of equipment(CGA)    FIM Comprehension Score:  4 - Minimal Assistance  Comprehension Description:  (Might be more of a language barrier than comprehension skills.)    FIM Expression Score:  6 - Modified Independent  Expression Description:       FIM Social Interaction Score:  7 - Independent  Social Interaction Description:       FIM Problem Solving Score:  5 - Standby Prompting/Supervision or Set-up  Problem Solving Description:       Assessment  Patient is 54 y.o. male with a diagnosis of L BKA and is on Contact Precautions for MDRO. Comorbidities include DM, HTN, CAD, HLD and Hx of diabetic foot ulcer. Pt had gangrene in L foot. On 8-2019 it was suggested pt get BKA, however, pt declined. Pt presented to hospital on 12-16-19; BKA completed on 12-20-19.Additional factors influencing patient status / progress (ie: cognitive factors, co-morbidities, social support, etc): pt has strong support from his wife, however, she needed frequent reminders to let him do his ADLs so we could assess his independence. Pt's wife works long shifts every night and so pt will need to be mod I upon discharge.    Plan  Recommend Occupational Therapy  minutes per day 5-7 days per week for 10-14 days for the following treatments:  OT Orthotics Training, OT E Stim Attended, OT Group Therapy, OT Self Care/ADL, OT Community Reintegration, OT Manual Ther Technique, OT Neuro Re-Ed/Balance, OT Sensory Int Techniques, OT Therapeutic Activity, OT Ultrasound, OT  Evaluation and OT Therapeutic Exercise.    Goals:  Long term and short term goals have been discussed with patient and spouse and they are in agreement.    Occupational Therapy Goals     Problem: Bathing     Dates: Start: 12/28/19       Description:     Goal: STG-Within one week, patient will bathe     Dates: Start: 12/28/19   Expected End: 01/04/20       Description: 1) Individualized Goal:  With mod I  2) Interventions:  OT E Stim Attended, OT Group Therapy, OT Self Care/ADL, OT Cognitive Skill Dev, OT Community Reintegration, OT Manual Ther Technique, OT Neuro Re-Ed/Balance, OT Sensory Int Techniques, OT Therapeutic Activity, OT Evaluation and OT Therapeutic Exercise                   Problem: Dressing     Dates: Start: 12/28/19       Description:     Goal: STG-Within one week, patient will dress LB     Dates: Start: 12/28/19   Expected End: 01/04/20       Description: 1) Individualized Goal:  With mod I  2) Interventions:  OT E Stim Attended, OT Group Therapy, OT Self Care/ADL, OT Cognitive Skill Dev, OT Community Reintegration, OT Manual Ther Technique, OT Neuro Re-Ed/Balance, OT Sensory Int Techniques, OT Therapeutic Activity, OT Evaluation and OT Therapeutic Exercise                 Problem: Functional Transfers     Dates: Start: 12/28/19       Description:     Goal: STG-Within one week, patient will transfer to toilet     Dates: Start: 12/28/19   Expected End: 01/04/20       Description: 1) Individualized Goal:  With mod I  2) Interventions:  OT E Stim Attended, OT Group Therapy, OT Self Care/ADL, OT Cognitive Skill Dev, OT Community Reintegration, OT Manual Ther Technique, OT Neuro Re-Ed/Balance, OT Sensory Int Techniques, OT Therapeutic Activity, OT Evaluation and OT Therapeutic Exercise                 Problem: OT Long Term Goals     Dates: Start: 12/28/19       Description:     Goal: LTG-By discharge, patient will complete basic self care tasks     Dates: Start: 12/28/19   Expected End: 01/04/20        Description: 1) Individualized Goal:  With mod I  2) Interventions:  OT E Stim Attended, OT Group Therapy, OT Self Care/ADL, OT Cognitive Skill Dev, OT Community Reintegration, OT Manual Ther Technique, OT Neuro Re-Ed/Balance, OT Sensory Int Techniques, OT Therapeutic Activity, OT Evaluation and OT Therapeutic Exercise                 Problem: Toileting     Dates: Start: 12/28/19       Description:     Goal: STG-Within one week, patient will complete toileting tasks     Dates: Start: 12/28/19   Expected End: 01/04/20       Description: 1) Individualized Goal:  With mod I  2) Interventions:  OT E Stim Attended, OT Group Therapy, OT Self Care/ADL, OT Cognitive Skill Dev, OT Community Reintegration, OT Manual Ther Technique, OT Neuro Re-Ed/Balance, OT Sensory Int Techniques, OT Therapeutic Activity, OT Evaluation and OT Therapeutic Exercise

## 2019-12-28 NOTE — PROGRESS NOTES
Rehab Progress Note     Encounter Date: 12/28/2019    CC: CARLY BKA, weakness, DM    Interval Events (Subjective)  Patient sitting up in therapy gym. He reports he is doing Good. He reports he slept OK overnight. He is still having pain but would prefer alternative ways to treat it. PT is working on desensitization and mirror therapy. Patient with elevated blood sugars and hypotension. Discussed consulting hospitalist and patient/wife in agreement. Reviewed admission labs including mild anemia, hyponatremia, and low vitamin D.     Objective:  VITAL SIGNS: BP (!) 92/64   Pulse 95   Temp 36.9 °C (98.4 °F) (Oral)   Resp 16   Ht 1.829 m (6')   Wt 78.6 kg (173 lb 4.5 oz)   SpO2 98%   BMI 23.50 kg/m²   Gen: NAD  Psych: Mood and affect appropriate  CV: RRR, no edema  Resp: CTAB, no upper airway sounds  Abd: NTND  Neuro: AOx4, moving LLE against gravity    Recent Results (from the past 72 hour(s))   ACCU-CHEK GLUCOSE    Collection Time: 12/25/19  4:58 PM   Result Value Ref Range    Glucose - Accu-Ck 130 (H) 65 - 99 mg/dL   ACCU-CHEK GLUCOSE    Collection Time: 12/25/19  7:26 PM   Result Value Ref Range    Glucose - Accu-Ck 108 (H) 65 - 99 mg/dL   ACCU-CHEK GLUCOSE    Collection Time: 12/26/19  5:40 AM   Result Value Ref Range    Glucose - Accu-Ck 123 (H) 65 - 99 mg/dL   ACCU-CHEK GLUCOSE    Collection Time: 12/26/19 10:10 AM   Result Value Ref Range    Glucose - Accu-Ck 156 (H) 65 - 99 mg/dL   ACCU-CHEK GLUCOSE    Collection Time: 12/26/19  5:00 PM   Result Value Ref Range    Glucose - Accu-Ck 186 (H) 65 - 99 mg/dL   ACCU-CHEK GLUCOSE    Collection Time: 12/26/19  9:14 PM   Result Value Ref Range    Glucose - Accu-Ck 224 (H) 65 - 99 mg/dL   ACCU-CHEK GLUCOSE    Collection Time: 12/27/19  5:31 AM   Result Value Ref Range    Glucose - Accu-Ck 93 65 - 99 mg/dL   ACCU-CHEK GLUCOSE    Collection Time: 12/27/19  6:22 PM   Result Value Ref Range    Glucose - Accu-Ck 134 (H) 65 - 99 mg/dL   ACCU-CHEK GLUCOSE    Collection  Time: 12/27/19  8:27 PM   Result Value Ref Range    Glucose - Accu-Ck 162 (H) 65 - 99 mg/dL   CBC with Differential    Collection Time: 12/28/19  5:54 AM   Result Value Ref Range    WBC 8.4 4.8 - 10.8 K/uL    RBC 3.74 (L) 4.70 - 6.10 M/uL    Hemoglobin 10.0 (L) 14.0 - 18.0 g/dL    Hematocrit 32.8 (L) 42.0 - 52.0 %    MCV 87.7 81.4 - 97.8 fL    MCH 26.7 (L) 27.0 - 33.0 pg    MCHC 30.5 (L) 33.7 - 35.3 g/dL    RDW 54.5 (H) 35.9 - 50.0 fL    Platelet Count 437 164 - 446 K/uL    MPV 9.1 9.0 - 12.9 fL    Neutrophils-Polys 46.50 44.00 - 72.00 %    Lymphocytes 42.70 (H) 22.00 - 41.00 %    Monocytes 8.00 0.00 - 13.40 %    Eosinophils 0.60 0.00 - 6.90 %    Basophils 0.50 0.00 - 1.80 %    Immature Granulocytes 1.70 (H) 0.00 - 0.90 %    Nucleated RBC 0.00 /100 WBC    Neutrophils (Absolute) 3.90 1.82 - 7.42 K/uL    Lymphs (Absolute) 3.58 1.00 - 4.80 K/uL    Monos (Absolute) 0.67 0.00 - 0.85 K/uL    Eos (Absolute) 0.05 0.00 - 0.51 K/uL    Baso (Absolute) 0.04 0.00 - 0.12 K/uL    Immature Granulocytes (abs) 0.14 (H) 0.00 - 0.11 K/uL    NRBC (Absolute) 0.00 K/uL   Comp Metabolic Panel (CMP)    Collection Time: 12/28/19  5:54 AM   Result Value Ref Range    Sodium 132 (L) 135 - 145 mmol/L    Potassium 5.0 3.6 - 5.5 mmol/L    Chloride 99 96 - 112 mmol/L    Co2 27 20 - 33 mmol/L    Anion Gap 6.0 0.0 - 11.9    Glucose 138 (H) 65 - 99 mg/dL    Bun 19 8 - 22 mg/dL    Creatinine 0.77 0.50 - 1.40 mg/dL    Calcium 8.9 8.5 - 10.5 mg/dL    AST(SGOT) 29 12 - 45 U/L    ALT(SGPT) 54 (H) 2 - 50 U/L    Alkaline Phosphatase 96 30 - 99 U/L    Total Bilirubin 0.5 0.1 - 1.5 mg/dL    Albumin 3.1 (L) 3.2 - 4.9 g/dL    Total Protein 8.5 (H) 6.0 - 8.2 g/dL    Globulin 5.4 (H) 1.9 - 3.5 g/dL    A-G Ratio 0.6 g/dL   Vitamin D, 25-hydroxy (blood)    Collection Time: 12/28/19  5:54 AM   Result Value Ref Range    25-Hydroxy   Vitamin D 25 20 (L) 30 - 100 ng/mL   TSH with Reflex to FT4    Collection Time: 12/28/19  5:54 AM   Result Value Ref Range    TSH 3.030  0.380 - 5.330 uIU/mL   ESTIMATED GFR    Collection Time: 12/28/19  5:54 AM   Result Value Ref Range    GFR If African American >60 >60 mL/min/1.73 m 2    GFR If Non African American >60 >60 mL/min/1.73 m 2   ACCU-CHEK GLUCOSE    Collection Time: 12/28/19 11:24 AM   Result Value Ref Range    Glucose - Accu-Ck 159 (H) 65 - 99 mg/dL       Current Facility-Administered Medications   Medication Frequency   • insulin glargine (LANTUS) injection 15 Units Q EVENING    And   • glucose 4 g chewable tablet 16 g Q15 MIN PRN    And   • dextrose 50% (D50W) injection 50 mL Q15 MIN PRN   • vitamin D (cholecalciferol) 1000 UNIT tablet 1,000 Units DAILY   • Respiratory Care per Protocol Continuous RT   • Pharmacy Consult Request ...Pain Management Review 1 Each PHARMACY TO DOSE   • hydrALAZINE (APRESOLINE) tablet 25 mg Q8HRS PRN   • acetaminophen (TYLENOL) tablet 650 mg Q4HRS PRN   • senna-docusate (PERICOLACE or SENOKOT S) 8.6-50 MG per tablet 2 Tab BID    And   • polyethylene glycol/lytes (MIRALAX) PACKET 1 Packet QDAY PRN    And   • magnesium hydroxide (MILK OF MAGNESIA) suspension 30 mL QDAY PRN    And   • bisacodyl (DULCOLAX) suppository 10 mg QDAY PRN   • artificial tears ophthalmic solution 1 Drop PRN   • benzocaine-menthol (CEPACOL) lozenge 1 Lozenge Q2HRS PRN   • mag hydrox-al hydrox-simeth (MAALOX PLUS ES or MYLANTA DS) suspension 20 mL Q2HRS PRN   • ondansetron (ZOFRAN ODT) dispertab 4 mg 4X/DAY PRN    Or   • ondansetron (ZOFRAN) syringe/vial injection 4 mg 4X/DAY PRN   • traZODone (DESYREL) tablet 50 mg QHS PRN   • sodium chloride (OCEAN) 0.65 % nasal spray 2 Spray PRN   • aspirin EC (ECOTRIN) tablet 81 mg DAILY   • atorvastatin (LIPITOR) tablet 80 mg Nightly   • carvedilol (COREG) tablet 6.25 mg BID WITH MEALS   • clopidogrel (PLAVIX) tablet 75 mg QHS   • ferrous sulfate tablet 325 mg QDAY with Breakfast   • glipiZIDE (GLUCOTROL) tablet 5 mg QAM AC   • heparin injection 5,000 Units Q8HRS   • metFORMIN (GLUCOPHAGE) tablet  1,000 mg BID WITH MEALS   • oxyCODONE-acetaminophen (PERCOCET) 5-325 MG per tablet 1-2 Tab Q4HRS PRN   • spironolactone (ALDACTONE) tablet 25 mg Q DAY   • valsartan (DIOVAN) tablet 40 mg TWICE DAILY       Orders Placed This Encounter   Procedures   • Diet Order Diabetic     Standing Status:   Standing     Number of Occurrences:   1     Order Specific Question:   Diet:     Answer:   Diabetic [3]     Order Specific Question:   Miscellaneous modifications:     Answer:   Vegetarian [13]       Assessment:  Active Hospital Problems    Diagnosis   • *S/P BKA (below knee amputation) unilateral, left ([facility])   • PVD (peripheral vascular disease) ([facility])   • Vitamin D deficiency   • CAD (coronary artery disease)   • HTN (hypertension)   • Normocytic anemia   • Diabetic polyneuropathy associated with type 2 diabetes mellitus ([facility])   • Hyperlipidemia   • Uncontrolled type 2 diabetes mellitus with hyperglycemia ([facility])       Medical Decision Making and Plan:  L BKA - Patient with 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 Zhao on 12/20/19.  -PT and OT for mobility and ADLs  -NWB LLE.     Neuropathic pain - Patient would prefer no medications. PT working on desensitization and mirror therapy.     DM - Patient on Glipizide 5 mg qAM, Metformin 1000 mg BID and fingersticks   -Consult Hospitalist. Started on Lantus     HTN/CV/PAD - Patient on ASA 81 mg and Plavix 75 QPM. Patient with recent TTE with EF of 40%. Patient on Coreg 6.25 mg, Spironolactone 25 mg daily, and Valsartan 40 mg BID      HLD - Patient on Atorvastatin 80 mg QHS     Anemia - Patient on Fe supplement on transfer. Check AM CBC - 10 on admission.      Vitamin D - deficient on admission. Start 1000 U     DVT Ppx - Patient on Heparin on transfer. Monitor for ambulation.      Total time:  35 minutes.  I spent greater than 50% of the time for patient care and coordination on this date, including unit/floor time, and face-to-face time with the patient as per  assessment and plan above.  Discussion included admission labs, hypotension, low vitamin D and consult hospitalist. Discussed patient with hospitalist face to face on [facility] floor.     Oliverio Bai M.D.

## 2019-12-28 NOTE — THERAPY
"Occupational Therapy  Daily Treatment     Patient Name: Israel Aviles  Age:  54 y.o., Sex:  male  Medical Record #: [id number]  Today's Date: 12/28/2019     Precautions  Precautions: (P) Non Weight Bearing Left Lower Extremity, Other (See Comments)  Comments: (P) MDRO Contact precautions, BKA LLE         Subjective    \"I feel lightheaded.\"      Objective       12/28/19 1331   Precautions   Precautions Non Weight Bearing Left Lower Extremity;Other (See Comments)   Comments MDRO Contact precautions, BKA LLE   Vitals   Patient BP Position Supine   Blood Pressure (!) 97/65   Vitals Comments Pt c/o dizziness EOM    Pain 0 - 10 Group   Location Leg   Location Orientation Distal;Left   Pain Rating Scale (NPRS) 6   Description Aching   Therapist Pain Assessment Prior to Activity  (Nursing notified)   Cognition    Level of Consciousness Alert   Sitting Lower Body Exercises   Sitting Lower Body Exercises Yes   Other Exercises Introduction to mirror therapy technique EOM with use of full length mirror; education provided re: synchronised BLE movement, length of activity, frequency over 1-2 months.   Balance   Sitting Balance (Static) Good   Sitting Balance (Dynamic) Good   Standing Balance (Static) Good   Standing Balance (Dynamic) Fair -   Weight Shift Sitting Good   Weight Shift Standing Fair   Skilled Intervention Postural Facilitation;Compensatory Strategies   Bed Mobility    Supine to Sit Stand by Assist  (secondary to patient c/o dizziness)   Sit to Supine Supervised   Scooting Supervised   Rolling Supervised   Interdisciplinary Plan of Care Collaboration   IDT Collaboration with  Nursing;Family / Caregiver;Physician   Patient Position at End of Therapy In Bed;Family / Friend in Room   Collaboration Comments re: pain and CLOF   OT Total Time Spent   OT Individual Total Time Spent (Mins) 30   OT Charge Group   Charges Yes   OT Therapy Activity 2       Assessment    Pt cooperative and attentive with EOM pain " management and seated balance techniques. Pt reported dizziness and pain with functional transfers.     Plan    Cont' dynamic standing balance, strengthening, endurance building, family training, and compensatory strategies to increase overall safety and independence with ADL/IADL pursuits.

## 2019-12-28 NOTE — CARE PLAN
Problem: Safety  Goal: Will remain free from injury  Outcome: PROGRESSING AS EXPECTED  Intervention: Educate patient and significant other/support system about adaptive mobility strategies and safe transfers  Note:   Wife requested walker to assist  to restroom. Educated pt & spouse that until he is assessed for safe transfers by PT, we need to use wheelchair and have staff assist. Safe transfer to w/c observed w/contact guard assist. Pt able to support weight on RLE, good balance. Pt & spouse verbally acknowledged understanding. Pt returned to bed, bed in low position, call light within reach.     Problem: Venous Thromboembolism (VTW)/Deep Vein Thrombosis (DVT) Prevention:  Goal: Patient will participate in Venous Thrombosis (VTE)/Deep Vein Thrombosis (DVT)Prevention Measures  Outcome: PROGRESSING AS EXPECTED  Intervention: Assess and monitor for anticoagulation complications  Note:   Scheduled meds administered at hs.     Problem: Pain Management  Goal: Pain level will decrease to patient's comfort goal  Outcome: PROGRESSING AS EXPECTED  Intervention: Follow pain managment plan developed in collaboration with patient and Interdisciplinary Team  Note:   Pt c/o discomfort to LLE, request for Ace wrap to be less tight. Re-wrapped Ace bandage, secure, replaced brace to LLE. Pt declined PRN pain med or tylenol. No s/s of distress.

## 2019-12-28 NOTE — ASSESSMENT & PLAN NOTE
Left foot osteomyelitis S/P BKA on 12/20/19 by Dr. Zhao  Completed intravenous antimicrobial therapy per Infectious Diseases  Wound care and pain control  Ortho F/U today

## 2019-12-28 NOTE — ASSESSMENT & PLAN NOTE
Pt Hb dropped 3 g between June 2018 and June 2019  Continue Fe supplements for low iron stores  Consider outpt GI consult in this 55 yo male w/ a fairly rapid drop in H/H due to CURTIS

## 2019-12-28 NOTE — CONSULTS
HOSPITAL MEDICINE CONSULTATION    Requesting Physician:  Dr. Bai    Reason for Consult:  Congestive Heart Failure, Hypertension, and Diabetes    History of Present Illness:  The patient is a 54-year-old  [place] male with past medical history significant for peripheral vascular disease status post prior revascularization, diabetes, and chronic left foot infection.  He was admitted to [facility] on 12/16/19 for left foot osteomyelitis.  He was also diagnosed with a non-ST elevation myocardial infarction.  Cardiac catheterization demonstrated three vessel coronary artery disease with recommendations for medical management as the risks of coronary artery bypass grafting were determined to be greater than the benefits.  His ejection fraction improved from 35% to 45%.  The patient ultimately underwent left below knee amputation on 12/20/19 by Dr. Zhao and has completed intravenous antimicrobial therapy.  Due to his ongoing functional debility, the patient was transferred to [facility] on 12/27/19.  Hospital Medicine consultation is requested to assist in the management of this patient's newly diagnosed ischemic cardiomyopathy, hypertension, and diabetes.  Premorbidly, his outpatient medication regimen included ASA, Plavix, Lipitor, Metformin, and Glipizide.  The patient is also noted to have iron deficiency anemia.    Review of Systems:  Review of Systems   Constitutional: Negative for chills and fever.   HENT: Negative.    Eyes: Negative.    Respiratory: Negative for cough and shortness of breath.    Cardiovascular: Negative for chest pain and palpitations.   Gastrointestinal: Negative for abdominal pain, nausea and vomiting.   Genitourinary: Negative.    Musculoskeletal:        Wound pain   Skin: Negative for itching and rash.   Endo/Heme/Allergies: Does not bruise/bleed easily.   All other systems reviewed and are negative.      Allergies:  No Known  Allergies    Medications:    Current Facility-Administered Medications:   •  insulin glargine (LANTUS) injection 15 Units, 15 Units, Subcutaneous, Q EVENING, 15 Units at 12/28/19 2100 **AND** Accu-Chek ACHS, , , Q AC AND BEDTIME(S) **AND** NOTIFY MD and PharmD, , , Once **AND** glucose 4 g chewable tablet 16 g, 16 g, Oral, Q15 MIN PRN **AND** dextrose 50% (D50W) injection 50 mL, 50 mL, Intravenous, Q15 MIN PRN, Shawnee Nagel M.D.  •  vitamin D (cholecalciferol) 1000 UNIT tablet 1,000 Units, 1,000 Units, Oral, DAILY, Shawnee Nagel M.D., 1,000 Units at 12/29/19 0854  •  Respiratory Care per Protocol, , Nebulization, Continuous RT, Oliverio Bai M.D.  •  Pharmacy Consult Request ...Pain Management Review 1 Each, 1 Each, Other, PHARMACY TO DOSE, Oliverio Bai M.D.  •  hydrALAZINE (APRESOLINE) tablet 25 mg, 25 mg, Oral, Q8HRS PRN, Oliverio Bai M.D.  •  acetaminophen (TYLENOL) tablet 650 mg, 650 mg, Oral, Q4HRS PRN, Oliverio Bai M.D.  •  senna-docusate (PERICOLACE or SENOKOT S) 8.6-50 MG per tablet 2 Tab, 2 Tab, Oral, BID, Stopped at 12/27/19 2100 **AND** polyethylene glycol/lytes (MIRALAX) PACKET 1 Packet, 1 Packet, Oral, QDAY PRN **AND** magnesium hydroxide (MILK OF MAGNESIA) suspension 30 mL, 30 mL, Oral, QDAY PRN **AND** bisacodyl (DULCOLAX) suppository 10 mg, 10 mg, Rectal, QDAY PRN, Oliverio Bai M.D.  •  artificial tears ophthalmic solution 1 Drop, 1 Drop, Both Eyes, PRN, Oliverio Bai M.D.  •  benzocaine-menthol (CEPACOL) lozenge 1 Lozenge, 1 Lozenge, Mouth/Throat, Q2HRS PRN, Oliverio Bai M.D.  •  mag hydrox-al hydrox-simeth (MAALOX PLUS ES or MYLANTA DS) suspension 20 mL, 20 mL, Oral, Q2HRS PRN, Oliverio Bai M.D.  •  ondansetron (ZOFRAN ODT) dispertab 4 mg, 4 mg, Oral, 4X/DAY PRN **OR** ondansetron (ZOFRAN) syringe/vial injection 4 mg, 4 mg, Intramuscular, 4X/DAY PRN, Oliverio Bai M.D.  •  traZODone (DESYREL) tablet 50  mg, 50 mg, Oral, QHS PRN, Oliverio Bai M.D.  •  sodium chloride (OCEAN) 0.65 % nasal spray 2 Spray, 2 Spray, Nasal, PRN, Oliverio Bai M.D.  •  aspirin EC (ECOTRIN) tablet 81 mg, 81 mg, Oral, DAILY, Oliverio Bai M.D., 81 mg at 12/29/19 0854  •  atorvastatin (LIPITOR) tablet 80 mg, 80 mg, Oral, Nightly, Oliverio Bai M.D., Stopped at 12/28/19 2100  •  carvedilol (COREG) tablet 6.25 mg, 6.25 mg, Oral, BID WITH MEALS, Oliverio Bai M.D., Stopped at 12/28/19 1730  •  clopidogrel (PLAVIX) tablet 75 mg, 75 mg, Oral, QHS, Oliverio Bai M.D., 75 mg at 12/28/19 2125  •  ferrous sulfate tablet 325 mg, 325 mg, Oral, QDAY with Breakfast, Oliverio Bai M.D., 325 mg at 12/29/19 0854  •  glipiZIDE (GLUCOTROL) tablet 5 mg, 5 mg, Oral, QAM AC, Oliverio Bai M.D., 5 mg at 12/29/19 0854  •  heparin injection 5,000 Units, 5,000 Units, Subcutaneous, Q8HRS, Oliverio Bai M.D., 5,000 Units at 12/29/19 0558  •  metFORMIN (GLUCOPHAGE) tablet 1,000 mg, 1,000 mg, Oral, BID WITH MEALS, Oliverio Bai M.D., 1,000 mg at 12/29/19 0854  •  oxyCODONE-acetaminophen (PERCOCET) 5-325 MG per tablet 1-2 Tab, 1-2 Tab, Oral, Q4HRS PRN, Oliverio Bai M.D., 1 Tab at 12/28/19 1347  •  spironolactone (ALDACTONE) tablet 25 mg, 25 mg, Oral, Q DAY, Oliverio Bai M.D., 25 mg at 12/29/19 0558  •  valsartan (DIOVAN) tablet 40 mg, 40 mg, Oral, TWICE DAILY, Oliverio Bai M.D., 40 mg at 12/29/19 0558    Past Medical/Surgical History:  Past Medical History:   Diagnosis Date   • Diabetes (HCC)      Past Surgical History:   Procedure Laterality Date   • KNEE AMPUTATION BELOW Left 12/20/2019    Procedure: AMPUTATION, BELOW KNEE;  Surgeon: Koby Zhao M.D.;  Location: SURGERY [facility];  Service: Orthopedics   • IRRIGATION & DEBRIDEMENT ORTHO Left 6/24/2019    Procedure: IRRIGATION AND DEBRIDEMENT, WOUND-FOOT, BIOLOGIC  PLACEMENT, WOUND VAC PLACEMENT;  Surgeon: Charles Chopra M.D.;  Location: SURGERY [facility];  Service: Orthopedics       Social History:  Social History     Socioeconomic History   • Marital status:      Spouse name: Not on file   • Number of children: Not on file   • Years of education: Not on file   • Highest education level: Not on file   Occupational History   • Not on file   Social Needs   • Financial resource strain: Not on file   • Food insecurity:     Worry: Not on file     Inability: Not on file   • Transportation needs:     Medical: Not on file     Non-medical: Not on file   Tobacco Use   • Smoking status: Never Smoker   • Smokeless tobacco: Never Used   Substance and Sexual Activity   • Alcohol use: No   • Drug use: No   • Sexual activity: Not on file   Lifestyle   • Physical activity:     Days per week: Not on file     Minutes per session: Not on file   • Stress: Not on file   Relationships   • Social connections:     Talks on phone: Not on file     Gets together: Not on file     Attends [employer] service: Not on file     Active member of club or organization: Not on file     Attends meetings of clubs or organizations: Not on file     Relationship status: Not on file   • Intimate partner violence:     Fear of current or ex partner: Not on file     Emotionally abused: Not on file     Physically abused: Not on file     Forced sexual activity: Not on file   Other Topics Concern   • Not on file   Social History Narrative   • Not on file       Family History  The patient reports a family history of diabetes.    Physical Examination:   Vitals:    12/28/19 1905 12/29/19 0700 12/29/19 0900 12/29/19 0905   BP: (!) 91/61 110/76 (!) 82/60 110/62   Pulse: 97 76     Resp: 17 18     Temp: 36.9 °C (98.4 °F) 36.7 °C (98 °F)     TempSrc: Oral Oral     SpO2: 95% 100%     Weight:       Height:           Physical Exam   Constitutional: He is oriented to person, place, and time. No distress.   HENT:   Head:  Normocephalic and atraumatic.   Right Ear: External ear normal.   Left Ear: External ear normal.   Eyes: Conjunctivae and EOM are normal. Right eye exhibits no discharge. Left eye exhibits no discharge.   Neck: Normal range of motion. Neck supple. No tracheal deviation present.   Cardiovascular: Normal rate and regular rhythm.   Pulmonary/Chest: Effort normal and breath sounds normal. No stridor. No respiratory distress. He has no wheezes.   Abdominal: Soft. Bowel sounds are normal. He exhibits no distension. There is no tenderness.   Musculoskeletal:         General: No edema.      Comments: Left BKA dressed and in brace   Neurological: He is alert and oriented to person, place, and time.   Skin: Skin is warm and dry. He is not diaphoretic.   Vitals reviewed.      Laboratory Data:  Recent Labs     12/28/19  0554   WBC 8.4   RBC 3.74*   HEMOGLOBIN 10.0*   HEMATOCRIT 32.8*   MCV 87.7   MCH 26.7*   MCHC 30.5*   RDW 54.5*   PLATELETCT 437   MPV 9.1     Recent Labs     12/28/19  0554   SODIUM 132*   POTASSIUM 5.0   CHLORIDE 99   CO2 27   GLUCOSE 138*   BUN 19   CREATININE 0.77   CALCIUM 8.9       Imaging:  No orders to display       Impressions/Recommendations:  Uncontrolled type 2 diabetes mellitus with hyperglycemia (HCC)  HbA1c 8.5  Decrease Lantus for morning hypoglycemia  Continue Metformin and Glipizide, slowly dose titrate up to home regimen  Outpt meds include Metformin 1000 mg bid and Glipizide 5 mg bid    S/P BKA (below knee amputation) unilateral, left (HCC)  Left foot osteomyelitis S/P BKA on 12/20/19 by Dr. Zhao  Completed intravenous antimicrobial therapy per Infectious Diseases  Wound care and pain control    Vitamin D deficiency  Vit D level 20  Start supplementation    Normocytic anemia  Pt Hb dropped 3 g between June 2018 and June 2019  Continue Fe supplements for low iron stores  Consider outpt GI consult in this relatively young pt w/ a fairly rapid drop in H/H due to CURTIS    CAD (coronary artery  disease)  S/P NSTEMI w/ Echo showing EF 45%  Has three vessel coronary artery disease per Cardiac Cath on 12/16/19 w/ recommendations for medical management  On ASA, Lipitor, Valsartan, Coreg, and Aldactone  Outpt Cardiology F/U    PVD (peripheral vascular disease) (HCC)  Has h/o prior LLE revascularization  On ASA, Plavix, and Lipitor  Outpt Vascular F/U    HTN (hypertension)  On Coreg and Valsartan  Observe blood pressure trends    Full Code    Discussed in detail with patient's family.  Thank you for the opportunity to assist in this patient's care.  We will continue to follow along with you.

## 2019-12-29 LAB
GLUCOSE BLD-MCNC: 102 MG/DL (ref 65–99)
GLUCOSE BLD-MCNC: 129 MG/DL (ref 65–99)
GLUCOSE BLD-MCNC: 200 MG/DL (ref 65–99)
GLUCOSE BLD-MCNC: 51 MG/DL (ref 65–99)
GLUCOSE BLD-MCNC: 69 MG/DL (ref 65–99)
GLUCOSE BLD-MCNC: 80 MG/DL (ref 65–99)
GLUCOSE BLD-MCNC: 85 MG/DL (ref 65–99)

## 2019-12-29 PROCEDURE — 99233 SBSQ HOSP IP/OBS HIGH 50: CPT | Performed by: HOSPITALIST

## 2019-12-29 PROCEDURE — 700102 HCHG RX REV CODE 250 W/ 637 OVERRIDE(OP): Performed by: HOSPITALIST

## 2019-12-29 PROCEDURE — 97110 THERAPEUTIC EXERCISES: CPT

## 2019-12-29 PROCEDURE — 82962 GLUCOSE BLOOD TEST: CPT | Mod: 91

## 2019-12-29 PROCEDURE — 97530 THERAPEUTIC ACTIVITIES: CPT

## 2019-12-29 PROCEDURE — A9270 NON-COVERED ITEM OR SERVICE: HCPCS | Performed by: PHYSICAL MEDICINE & REHABILITATION

## 2019-12-29 PROCEDURE — 770010 HCHG ROOM/CARE - REHAB SEMI PRIVAT*

## 2019-12-29 PROCEDURE — 700111 HCHG RX REV CODE 636 W/ 250 OVERRIDE (IP): Performed by: PHYSICAL MEDICINE & REHABILITATION

## 2019-12-29 PROCEDURE — 700102 HCHG RX REV CODE 250 W/ 637 OVERRIDE(OP): Performed by: PHYSICAL MEDICINE & REHABILITATION

## 2019-12-29 PROCEDURE — A9270 NON-COVERED ITEM OR SERVICE: HCPCS | Performed by: HOSPITALIST

## 2019-12-29 RX ORDER — SPIRONOLACTONE 25 MG/1
12.5 TABLET ORAL
Status: DISCONTINUED | OUTPATIENT
Start: 2019-12-30 | End: 2020-01-04

## 2019-12-29 RX ORDER — VALSARTAN 80 MG/1
20 TABLET ORAL TWICE DAILY
Status: DISCONTINUED | OUTPATIENT
Start: 2019-12-29 | End: 2019-12-30

## 2019-12-29 RX ORDER — GLIPIZIDE 5 MG/1
2.5 TABLET ORAL
Status: DISCONTINUED | OUTPATIENT
Start: 2019-12-29 | End: 2020-01-02

## 2019-12-29 RX ADMIN — FERROUS SULFATE TAB 325 MG (65 MG ELEMENTAL FE) 325 MG: 325 (65 FE) TAB at 08:54

## 2019-12-29 RX ADMIN — GLIPIZIDE 5 MG: 5 TABLET ORAL at 08:54

## 2019-12-29 RX ADMIN — ASPIRIN 81 MG: 81 TABLET, COATED ORAL at 08:54

## 2019-12-29 RX ADMIN — HEPARIN SODIUM 5000 UNITS: 5000 INJECTION, SOLUTION INTRAVENOUS; SUBCUTANEOUS at 14:26

## 2019-12-29 RX ADMIN — ATORVASTATIN CALCIUM 80 MG: 40 TABLET, FILM COATED ORAL at 20:52

## 2019-12-29 RX ADMIN — SENNOSIDES AND DOCUSATE SODIUM 2 TABLET: 8.6; 5 TABLET ORAL at 20:52

## 2019-12-29 RX ADMIN — HEPARIN SODIUM 5000 UNITS: 5000 INJECTION, SOLUTION INTRAVENOUS; SUBCUTANEOUS at 20:58

## 2019-12-29 RX ADMIN — GLIPIZIDE 2.5 MG: 5 TABLET ORAL at 23:44

## 2019-12-29 RX ADMIN — CARVEDILOL 6.25 MG: 3.12 TABLET, FILM COATED ORAL at 18:05

## 2019-12-29 RX ADMIN — CLOPIDOGREL BISULFATE 75 MG: 75 TABLET ORAL at 20:52

## 2019-12-29 RX ADMIN — VALSARTAN 40 MG: 80 TABLET, FILM COATED ORAL at 05:58

## 2019-12-29 RX ADMIN — HEPARIN SODIUM 5000 UNITS: 5000 INJECTION, SOLUTION INTRAVENOUS; SUBCUTANEOUS at 05:58

## 2019-12-29 RX ADMIN — METFORMIN HYDROCHLORIDE 1000 MG: 500 TABLET ORAL at 08:54

## 2019-12-29 RX ADMIN — SPIRONOLACTONE 25 MG: 25 TABLET, FILM COATED ORAL at 05:58

## 2019-12-29 RX ADMIN — METFORMIN HYDROCHLORIDE 1000 MG: 500 TABLET ORAL at 23:44

## 2019-12-29 RX ADMIN — Medication 16 G: at 14:25

## 2019-12-29 RX ADMIN — VALSARTAN 20 MG: 80 TABLET, FILM COATED ORAL at 18:06

## 2019-12-29 RX ADMIN — VITAMIN D, TAB 1000IU (100/BT) 1000 UNITS: 25 TAB at 08:54

## 2019-12-29 ASSESSMENT — ENCOUNTER SYMPTOMS
ABDOMINAL PAIN: 0
FEVER: 0
BRUISES/BLEEDS EASILY: 0
COUGH: 0
NAUSEA: 0
SHORTNESS OF BREATH: 0
EYES NEGATIVE: 1
PALPITATIONS: 0
DIZZINESS: 1
VOMITING: 0
CHILLS: 0

## 2019-12-29 NOTE — CARE PLAN
Problem: Discharge Barriers/Planning  Goal: Patient's continuum of care needs will be met  Outcome: PROGRESSING SLOWER THAN EXPECTED  Note:   Pt having low blood pressures while sitting or standing today. Notified Dr. Nagel. Occupational therapy requests hold as pt is symptomatic. Pt reports feeling dizzy.     Problem: Acute Care of the Diabetic Patient  Goal: Optimal Outcome for the Diabetic Patient  Outcome: PROGRESSING SLOWER THAN EXPECTED  Intervention: Monitor for signs and symptoms of hyperglycemia or hypoglycemia  Note:   Pt reports feeling shaky and warm this afternoon. Blood sugar checked and blood glucose was 51. Pt given 2 orange juices. Re-checked in 15 minutes and pt was 69. Pt eating fruit and drinking Glucerna shake from home. Pt reports not feeling like eating lunch because his blood pressure was low.

## 2019-12-29 NOTE — PROGRESS NOTES
Hospital Medicine Daily Progress Note    Date of Service  12/29/2019    Chief Complaint  CHF, HTN, DM    Interval Problem Update  Pt's blood pressures and serum glucose both trending low the past 24 hours.    Review of Systems  Review of Systems   Constitutional: Negative for chills and fever.   HENT: Negative.    Eyes: Negative.    Respiratory: Negative for cough and shortness of breath.    Cardiovascular: Negative for chest pain and palpitations.   Gastrointestinal: Negative for abdominal pain, nausea and vomiting.   Genitourinary: Negative.    Musculoskeletal:        Wound pain   Skin: Negative for itching and rash.   Neurological: Positive for dizziness.   Endo/Heme/Allergies: Does not bruise/bleed easily.        Physical Exam  Temp:  [36.6 °C (97.8 °F)-36.9 °C (98.4 °F)] 36.7 °C (98 °F)  Pulse:  [76-99] 76  Resp:  [16-18] 18  BP: ()/(59-76) 110/62  SpO2:  [95 %-100 %] 100 %    Physical Exam  Vitals signs reviewed.   Constitutional:       General: He is not in acute distress.     Appearance: Normal appearance. He is not ill-appearing.   HENT:      Head: Normocephalic and atraumatic.      Right Ear: External ear normal.      Left Ear: External ear normal.      Nose: Nose normal.   Eyes:      General:         Right eye: No discharge.         Left eye: No discharge.      Extraocular Movements: Extraocular movements intact.      Conjunctiva/sclera: Conjunctivae normal.   Neck:      Musculoskeletal: Normal range of motion and neck supple.   Cardiovascular:      Rate and Rhythm: Normal rate and regular rhythm.   Pulmonary:      Effort: Pulmonary effort is normal. No respiratory distress.      Breath sounds: Normal breath sounds. No wheezing.   Abdominal:      General: Abdomen is flat. Bowel sounds are normal.      Palpations: Abdomen is soft.   Musculoskeletal:      Comments: Left BKA dressed and in brace   Skin:     General: Skin is warm and dry.   Neurological:      General: No focal deficit present.       Mental Status: He is alert and oriented to person, place, and time.         Fluids    Intake/Output Summary (Last 24 hours) at 12/29/2019 1113  Last data filed at 12/29/2019 0812  Gross per 24 hour   Intake 600 ml   Output --   Net 600 ml       Laboratory  Recent Labs     12/28/19  0554   WBC 8.4   RBC 3.74*   HEMOGLOBIN 10.0*   HEMATOCRIT 32.8*   MCV 87.7   MCH 26.7*   MCHC 30.5*   RDW 54.5*   PLATELETCT 437   MPV 9.1     Recent Labs     12/28/19  0554   SODIUM 132*   POTASSIUM 5.0   CHLORIDE 99   CO2 27   GLUCOSE 138*   BUN 19   CREATININE 0.77   CALCIUM 8.9                   Assessment/Plan  * S/P BKA (below knee amputation) unilateral, left (HCC)  Assessment & Plan  Left foot osteomyelitis S/P BKA on 12/20/19 by Dr. Zhao  Completed intravenous antimicrobial therapy per Infectious Diseases  Wound care and pain control    HTN (hypertension)  Assessment & Plan  Decrease Valsartan and Aldactone for low blood pressures and high trending K+  Continue current dose Coreg as HR in the 90's    CAD (coronary artery disease)  Assessment & Plan  S/P NSTEMI w/ Echo showing EF 45%  Has three vessel coronary artery disease per Cardiac Cath on 12/16/19 w/ recommendations for medical management  On ASA, Lipitor, Valsartan, Coreg, and Aldactone  Outpt Cardiology F/U    Vitamin D deficiency  Assessment & Plan  Vit D level 20  Continue supplementation    Normocytic anemia- (present on admission)  Assessment & Plan  Pt Hb dropped 3 g between June 2018 and June 2019  Continue Fe supplements for low iron stores  Consider outpt GI consult in this 53 yo male w/ a fairly rapid drop in H/H due to CURTIS    Uncontrolled type 2 diabetes mellitus with hyperglycemia (HCC)- (present on admission)  Assessment & Plan  HbA1c 8.5  Discontinue Lantus for hypoglycemic trends  Continue Metformin and Glipizide, slowly dose titrate up to home regimen  Outpt meds include Metformin 1000 mg bid and Glipizide 5 mg bid    PVD (peripheral vascular disease)  (HCC)  Assessment & Plan  Has h/o prior LLE revascularization  On ASA, Plavix, and Lipitor  Outpt Vascular F/U    Full Code    Discussed w/ pt, family, and RN

## 2019-12-29 NOTE — THERAPY
Physical Therapy   Daily Treatment     Patient Name: Israel Aviles  Age:  54 y.o., Sex:  male  Medical Record #: [id number]  Today's Date: 12/28/2019     Precautions  Precautions: Non Weight Bearing Left Lower Extremity, Fall Risk, Other (See Comments)  Comments: MDRO Contact precautions, BKA LLE, Hypotension    Subjective    Patient received supine sleeping in bed; attempted but unable to sit longer than 10 seconds at EOB today; SO present; pt reports dizziness and fatigue.     Objective       12/28/19 1501   Precautions   Precautions Non Weight Bearing Left Lower Extremity;Fall Risk;Other (See Comments)   Comments MDRO Contact precautions, BKA LLE, Hypotension   Vitals   Pulse 99   Patient BP Position Supine   Blood Pressure (!) 96/59  (RN notified)   Pulse Oximetry 96 %   O2 (LPM) 0   O2 Delivery None (Room Air)   Vitals Comments Pt unable to sit EOB for 10 seconds due to increase in dizziness symptoms.   Bed Mobility    Supine to Sit Moderate Assist   Sit to Supine Moderate Assist   Sit to Stand Unable to Participate   Scooting Unable to Participate   Rolling Unable to Participate   Interdisciplinary Plan of Care Collaboration   IDT Collaboration with  Family / Caregiver;Nursing;Physician;Therapy Tech   Collaboration Comments SO present during pt's session; RN notified of BP along with MD; MD placed pt on hold from therapies today; spoke with therapy scheduling to schedule additional therapy for tomorrow with patient on hold for remainder of day   Therapy Missed   Missed Therapy (Minutes) 15   Reason For Missed Therapy Medical - Patient on Hold from Therapy;Medical - Patient not Able To Participate;Medical - Patient Is Not Medically Stable   PT Total Time Spent   PT Individual Total Time Spent (Mins) 15   PT Charge Group   PT Therapeutic Activities 1       FIM Bed/Chair/Wheelchair Transfers Score: 0 - Not tested,medical condition  Bed/Chair/Wheelchair Transfers Description:  (Min A to CGA, setup, gait belt,  FWW, increased time, LLE immobilizer)    FIM Walking Score:  0 - Not tested,medical condition  Walking Description:      FIM Wheelchair Score:  0 - Not tested,medical condition  Wheelchair Description:      FIM Stairs Score:  0 - Not tested,unsafe activity  Stairs Description:         Assessment    Patient and pt's SO receptive to education and checking BP but pt unable to have sitting BP read due to dizziness symptoms; pt placed on hold; will resume therapy tomorrow.    Plan    Monitor BP especially if dizziness symptoms are present, seated and supine ther ex program for strengthening and LLE AROM, safety with FWW transfers, family training as appropriate, indoor/outdoor w/c propulsion

## 2019-12-29 NOTE — THERAPY
"Occupational Therapy  Daily Treatment     Patient Name: Israel Aviles  Age:  54 y.o., Sex:  male  Medical Record #: [id number]  Today's Date: 12/29/2019     Precautions  Precautions: Non Weight Bearing Left Lower Extremity, Fall Risk, Other (See Comments)  Comments: MDRO Contact precautions; L BKA, Hypotensive    Safety   ADL Safety : Requires Physical Assist for Safety, Requires Supervision for Safety    Subjective    \"I don't remember you. I know I showered yesterday, but my low BP made it very hard to speak and think.\"    \"I don't feel as dizzy as yesterday, but still dizzy.\"    \"My wife was here all night. She is worried about my BP too. She checked it several times throughout the night...\" \"... no, my wife does not work in healthcare. She works at the gas station too. We've been together for 26 years and I am very mary to have her.\"     Objective       12/29/19 1031   Precautions   Precautions Non Weight Bearing Left Lower Extremity;Fall Risk;Other (See Comments)   Comments MDRO Contact precautions; L BKA, Hypotensive   Safety    ADL Safety  Requires Physical Assist for Safety;Requires Supervision for Safety   Vitals   Patient BP Position Sitting   Blood Pressure (!) 86/60   Pulse Oximetry 100 %   O2 (LPM) 0   Vitals Comments BP taken at EOB. Discussed BP with nursing. Pt asymptomatic. Will continue to monitor for appropriateness for therapy session.   Pain   Intervention Declines   Pain 0 - 10 Group   Location Leg   Cognition    Orientation Level Other (Comment)  (Person)   Level of Consciousness Alert   Attention Impaired   Comments Pt does not remember OTR from yesterday's therapy session. Pt's expression and comprehension is better today than yesterday. Pt explained that he forgets things when his bp is low.    Balance   Sitting Balance (Static) Good   Sitting Balance (Dynamic) Good   Bed Mobility    Supine to Sit Supervised   Comments Supine to sit x 5 in bed. Pt not safe to get out of bed at this " time due to BP   Interdisciplinary Plan of Care Collaboration   IDT Collaboration with  Nursing   Patient Position at End of Therapy In Bed;Call Light within Reach;Tray Table within Reach;Phone within Reach   Collaboration Comments discussed low BP with RN. RN collaborated with MD who placed pt on a hold.   Therapy Missed   Missed Therapy (Minutes) 15   Reason For Missed Therapy Medical - Patient Is Not Medically Stable;Medical - Patient on Hold from Therapy   OT Total Time Spent   OT Individual Total Time Spent (Mins) 15   OT Charge Group   OT Therapeutic Exercise  1       Assessment    Pt with bright affect. Pt more alert and expressive this morning. Pt did not remember this staff member from yesterday's treatment. Pt had 15/15 on BIMS yesterday, but otherwise had limitations throughout session with comprehension and expression which has improved today. Pt now on hold due to low BP. Will check on pt in afternoon to determine appropriateness for therapy.    Plan    Cont' dynamic standing balance, strengthening, endurance building, family training, and compensatory strategies to increase overall safety and independence with ADL/IADL pursuits.

## 2019-12-29 NOTE — PROGRESS NOTES
Pt reporting dizziness today. Pt's blood pressure running low today. 8:00 am dose of Coreg held this morning. Evening blood pressure meds held as well per parameters. Pt resting in bed, but does report continued dizziness. Dr. Bai notified.

## 2019-12-29 NOTE — CARE PLAN
Problem: Safety  Goal: Will remain free from injury  Outcome: PROGRESSING AS EXPECTED     Problem: Pain Management  Goal: Pain level will decrease to patient's comfort goal  Outcome: PROGRESSING AS EXPECTED  Note:   Pt requested PRN percocet for pain in RLE during therapy. Pt able to participate in therapies and activities this shift.

## 2019-12-29 NOTE — PROGRESS NOTES
Received report from previous Nurse at shift change. Patient lying in bed, no complaints voiced and denied pain at that time. Cooperative with medication administration and assessment.Will continue to monitor.

## 2019-12-29 NOTE — THERAPY
Physical Therapy   Initial Evaluation     Patient Name: Israel Aviles  Age:  54 y.o., Sex:  male  Medical Record #: [id number]  Today's Date: 12/28/2019     Subjective    Patient agreeable to PT. Reports dizziness with positional changes only today; reports that pt had low BP earlier in day.     Objective       12/28/19 1031   Prior Living Situation   Prior Services Home-Independent   Housing / Facility 1 Story House   Steps Into Home 2 (platform steps)   Steps In Home 0   Rail None   Elevator No   Equipment Owned None   Lives with - Patient's Self Care Capacity Spouse   Prior Level of Functional Mobility   Bed Mobility Independent   Transfer Status Independent   Ambulation Independent   Distance Ambulation (Feet)   (community; pt reports works full-time also)   Assistive Devices Used None   Stairs Independent   Comments Patient reports IND PLOF including Working full-time in gas station; pt reports not being able to exercise for the past 5-6 months but would do Jujitsu and walk for exercise   IRF-JOSSUE:  Prior Functioning: Everyday Activities   Self Care Independent   Indoor Mobility (Ambulation) Independent   Stairs Independent   Functional Cognition Independent   Prior Device Use None of the given options   Vitals   O2 (LPM) 0   O2 Delivery None (Room Air)   Vitals Comments Pt only reports dizzines with initial changes in position this session (supine to sitting, and sitting to standing) but resolves after 1-2 minutes.   Cognition    Cognition / Consciousness WDL   Passive ROM Lower Body   Passive ROM Lower Body X   Comments Left knee terminal extension limited by 5 degrees but L knee flexion limited by pain to about 85 degrees flexion PROM   Active ROM Lower Body    Active ROM Lower Body  X   Comments Left knee terminal extension limited by 5 degrees but L knee flexion limited by pain to about 80 degrees flexion AROM   Strength Lower Body   Lower Body Strength  X   Comments Grossly 3+/5 to 4/5 BLE MMT; recent  L BKA   Bed Mobility    Supine to Sit Stand by Assist   Sit to Supine Supervised   Sit to Stand Minimal Assist   Scooting Stand by Assist   Rolling   (SBA)   IRF-JOSSUE:  Roll Left and Right   Assistance Needed Supervision   Physical Assistance Level No physical assistance or only touching/steadying assist   CARE Score 4   Discharge Goal:  Assistance Needed Adaptive equipment   Discharge Goal:  Physical Assistance Level No physical assistance or only touching/steadying assist   Discharge Goal:  Score 6   IRF-JOSSUE:  Sit to Lying   Assistance Needed Supervision   Physical Assistance Level No physical assistance or only touching/steadying assist   CARE Score 4   Discharge Goal:  Assistance Needed Adaptive equipment   Discharge Goal:  Physical Assistance Level No physical assistance or only touching/steadying assist   Discharge Goal:  Score 6   IRF-JOSSUE:  Lying to Sitting on Side of Bed   Assistance Needed Supervision   Physical Assistance Level No physical assistance or only touching/steadying assist   CARE Score 4   Discharge Goal:  Assistance Needed Adaptive equipment   Discharge Goal:  Physical Assistance Level No physical assistance or only touching/steadying assist   Discharge Goal:  Score 6   IRF-JOSSUE:  Sit to Stand   Assistance Needed Physical assistance   Physical Assistance Level Less than 25%   CARE Score 3   Discharge Goal:  Assistance Needed Adaptive equipment   Discharge Goal:  Physical Assistance Level No physical assistance or only touching/steadying assist   Discahrge Goal:  Score 6   IRF-JOSSUE:  Chair/Bed-to-Chair Transfer   Assistance Needed Incidental touching   Physical Assistance Level No physical assistance or only touching/steadying assist   CARE Score 4   Discharge Goal:  Assistance Needed Adaptive equipment   Discharge Goal:  Physical Assistance Level No physical assistance or only touching/steadying assist   Discharge Goal:  Score 6   IRF-JOSSUE:  Car Transfer   Reason if not Attempted Safety concerns    CARE Score 88   Discharge Goal:  Assistance Needed Supervision   Discharge Goal:  Physical Assistance Level No physical assistance or only touching/steadying assist   Discharge Goal:  Score 4   IRF JOSSUE:  Walking   Does the Patient Walk? Yes   IRF JOSSUE:  Walk 10 Feet   Assistance Needed Incidental touching   Physical Assistance Level No physical assistance or only touching/steadying assist   CARE Score 4   Discharge Goal:  Assistance Needed Adaptive equipment   Discharge Goal:  Physical Assistance Level No physical assistance or only touching/steadying assist   Discharge Goal:  Score 6   IRF-JOSSUE:  Walk 50 Feet with Two Turns   Reason if not Attempted Safety concerns   CARE Score 88   Discharge Goal:  Assistance Needed Physical assistance   Discharge Goal:  Physical Assistance Level Total assistance   Discharge Goal:  Score 1   IRF-JOSSUE:  Walk 150 Feet   Reason if not Attempted Safety concerns   CARE Score 88   Discharge Goal:  Assistance Needed Physical assistance   Discharge Goal:  Physical Assistance Level Total assistance   Discharge Goal:  Score 1   IRF JOSSUE:  Walking 10 Feet on Uneven Surfaces   Reason if not Attempted Safety concerns   CARE Score 88   Discharge Goal:  Assistance Needed Supervision   Discharge Goal:  Physical Assistance Level No physical assistance or only touching/steadying assist   Discharge Goal:  Score 4   IRF JOSSUE:  1 Step (Curb)   Assistance Needed Physical assistance   Physical Assistance Level Less than 25%   CARE Score 3   Discharge Goal:  Assistance Needed Supervision   Discharge Goal:  Physical Assistance Level No physical assistance or only touching/steadying assist   Discharge Goal:  Score 4   IRF-JOSSUE:  4 Steps   Reason if not Attempted Safety concerns   CARE Score 88   Discharge Goal:  Assistance Needed Physical assistance   Discharge Goal:  Physical Assistance Level Total assistance   Discharge Goal:  Score 1   IRF JOSSUE:  12 Steps   Reason if not Attempted Safety concerns   CARE  Score 88   Discharge Goal:  Assistance Needed Physical assistance   Discharge Goal:  Physical Assistance Level Total assistance   Discharge Goal:  Score 1   IRF JOSSUE:  Picking Up Object   Reason if not Attempted Safety concerns   CARE Score 88   Discharge Goal:  Assistance Needed Supervision   Discharge Goal:  Physical Assistance Level No physical assistance or only touching/steadying assist   Discharge Goal:  Score 4   IRF-JOSSUE:  Wheel 50 Feet with Two Turns   Indicate the Type of Wheelchair or Scooter Used Manual   Assistance Needed Supervision   Physical Assistance Level No physical assistance or only touching/steadying assist   CARE Score 4   Discharge Goal:  Assistance Needed Adaptive equipment   Discharge Goal:  Physical Assistance Level No physical assistance or only touching/steadying assist   Discharge Goal:  Score 6   IRF-JOSSUE:  Wheel 150 Feet   Reason if not Attempted Safety concerns   CARE Score 88   Discharge Goal:  Assistance Needed Adaptive equipment   Discharge Goal:  Physical Assistance Level No physical assistance or only touching/steadying assist   Discharge Goal:  Score 6   Problem List    Problems Pain;Impaired Bed Mobility;Impaired Transfers;Impaired Ambulation;Functional ROM Deficit;Functional Strength Deficit;Impaired Balance;Decreased Activity Tolerance;Limited Knowledge of Post-Op Precautions   Precautions   Precautions Non Weight Bearing Left Lower Extremity;Other (See Comments)   Comments MDRO Contact precautions, BKA LLE, hypotension   Current Discharge Plan   Current Discharge Plan Return to Prior Living Situation   Interdisciplinary Plan of Care Collaboration   IDT Collaboration with  Nursing   Patient Position at End of Therapy Seated;Self Releasing Lap Belt Applied;Call Light within Reach;Tray Table within Reach;Phone within Reach   Collaboration Comments reports holding a medication earlier today due to hypotension; roomboard updated   Benefit   Therapy Benefit Patient Would Benefit  "from Inpatient Rehabilitation Physical Therapy to Maximize Functional [place] with ADLs, IADLs and Mobility.   PT Total Time Spent   PT Individual Total Time Spent (Mins) 60   PT Charge Group   PT Therapeutic Exercise 1   PT Evaluation PT Evaluation Mod     BLE ther ex seated supported: 3-lb B weights for LAQ, marching. Manual resistance for hip ABD<>ADD, knee flexion.  1x 10 each LE.    FIM Bed/Chair/Wheelchair Transfers Score: 4 - Minimal Assistance  Bed/Chair/Wheelchair Transfers Description:  (Min A to CGA, setup, gait belt, FWW, increased time, LLE immobilizer)    FIM Walking Score:  1 - Total Assistance  Walking Description:  (2x 22 feet ambulation with FWW, gait belt, CGA, hop-to pattern, LLE immobilizer)    FIM Wheelchair Score:  1 - Total Assistance  Wheelchair Description:  (indoors today for 105 feet and 45 feet; limited by endurance; setup for L swing-away and R standard legrests; SPV; increased time; cueing for path finding)    FIM Stairs Score:  0 - Not tested,unsafe activity  Stairs Description:       Assessment  Patient is 54 y.o. 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  Additional factors influencing patient status / progress (ie: cognitive factors, co-morbidities, social support, etc): Hypotension, impaired endurance, impaired balance, impaired strength, fair motivation, pt reports fair social support, pt reports IND PLOF including working full-time but has been unable to exercise for past \"5 to 6 months\" with walking or Jujitsu, pt reports 2 curb steps to enter home, pt reports lives with SO.      Plan  Recommend Physical Therapy  minutes per day 5-7 days per week for 10 days for the following treatments:  PT Group Therapy, PT Prosthetic Training, PT Gait Training, PT Self Care/Home Eval, PT Therapeutic Exercises, PT Neuro Re-Ed/Balance, PT Therapeutic Activity and PT Manual Therapy.    Goals:  Long term and short term goals have been discussed with patient and " spouse and they are in agreement.    Physical Therapy Problems     Problem: Mobility     Dates: Start: 12/28/19       Description:     Goal: STG-Within one week, patient will propel wheelchair community     Dates: Start: 12/28/19       Description: 1) Individualized goal:  2x 150 feet at SPV level indoors or Min A outdoors with ramps  2) Interventions:  PT Group Therapy, PT Prosthetic Training, PT Gait Training, PT Self Care/Home Eval, PT Therapeutic Exercises, PT TENS Application, PT Neuro Re-Ed/Balance, PT Therapeutic Activity and PT Manual Therapy                 Problem: Mobility Transfers     Dates: Start: 12/28/19       Description:     Goal: STG-Within one week, patient will perform bed mobility     Dates: Start: 12/28/19       Description: 1) Individualized goal:  at SPV level without AD  2) Interventions:  PT Group Therapy, PT Prosthetic Training, PT Gait Training, PT Self Care/Home Eval, PT Therapeutic Exercises, PT TENS Application, PT Neuro Re-Ed/Balance, PT Therapeutic Activity and PT Manual Therapy             Goal: STG-Within one week, patient will sit to stand     Dates: Start: 12/28/19       Description: 1) Individualized goal:  With FWW at SPV level  2) Interventions:  PT Group Therapy, PT Prosthetic Training, PT Gait Training, PT Self Care/Home Eval, PT Therapeutic Exercises, PT TENS Application, PT Neuro Re-Ed/Balance, PT Therapeutic Activity and PT Manual Therapy             Goal: STG-Within one week, patient will transfer bed to chair     Dates: Start: 12/28/19       Description: 1) Individualized goal: With FWW at SPV level  2) Interventions: PT Group Therapy, PT Prosthetic Training, PT Gait Training, PT Self Care/Home Eval, PT Therapeutic Exercises, PT TENS Application, PT Neuro Re-Ed/Balance, PT Therapeutic Activity and PT Manual Therapy             Goal: STG-Within one week, patient will transfer in/out of car     Dates: Start: 12/28/19       Description: 1) Individualized goal: With FWW at  SBA level  2) Interventions: PT Group Therapy, PT Prosthetic Training, PT Gait Training, PT Self Care/Home Eval, PT Therapeutic Exercises, PT TENS Application, PT Neuro Re-Ed/Balance, PT Therapeutic Activity and PT Manual Therapy                   Problem: PT-Long Term Goals     Dates: Start: 12/28/19       Description:     Goal: LTG-By discharge, patient will propel wheelchair     Dates: Start: 12/28/19       Description: 1) Individualized goal:  300 feet indoors/outdoors at Mod I level  2) Interventions:  PT Group Therapy, PT Prosthetic Training, PT Gait Training, PT Self Care/Home Eval, PT Therapeutic Exercises, PT TENS Application, PT Neuro Re-Ed/Balance, PT Therapeutic Activity and PT Manual Therapy           Goal: LTG-By discharge, patient will ambulate     Dates: Start: 12/28/19       Description: 1) Individualized goal:  with FWW for 25 feet indoors at SPV level  2) Interventions:  PT Group Therapy, PT Prosthetic Training, PT Gait Training, PT Self Care/Home Eval, PT Therapeutic Exercises, PT TENS Application, PT Neuro Re-Ed/Balance, PT Therapeutic Activity and PT Manual Therapy             Goal: LTG-By discharge, patient will transfer one surface to another     Dates: Start: 12/28/19       Description: 1) Individualized goal:  with FWW at Mod I level for bed transfers  2) Interventions:  PT Group Therapy, PT Prosthetic Training, PT Gait Training, PT Self Care/Home Eval, PT Therapeutic Exercises, PT TENS Application, PT Neuro Re-Ed/Balance, PT Therapeutic Activity and PT Manual Therapy               Goal: LTG-By discharge, patient will transfer in/out of a car     Dates: Start: 12/28/19       Description: 1) Individualized goal:  with FWW at V level for car transfers  2) Interventions:  PT Group Therapy, PT Prosthetic Training, PT Gait Training, PT Self Care/Home Eval, PT Therapeutic Exercises, PT TENS Application, PT Neuro Re-Ed/Balance, PT Therapeutic Activity and PT Manual Therapy

## 2019-12-29 NOTE — THERAPY
Physical Therapy   Daily Treatment     Patient Name: Israel Aviles  Age:  54 y.o., Sex:  male  Medical Record #: [id number]  Today's Date: 12/29/2019     Precautions  Precautions: Non Weight Bearing Left Lower Extremity, Fall Risk, Other (See Comments)  Comments: MDRO Contact precautions; L BKA, Hypotensive    Subjective    Patient was sitting up in bed upon PT arrival, and agreeable to POC after determining BP was low. Patient did report mild dizziness.      Objective       12/29/19 0831   Precautions   Precautions Non Weight Bearing Left Lower Extremity;Fall Risk;Other (See Comments)   Comments MDRO Special Contact precautions; L BKA, Hypotensive   Vitals   Pulse (!) 104   Patient BP Position Sitting  (in bed)   Blood Pressure (!) 86/58   Supine Lower Body Exercise   Straight Leg Raises Front;1 set of 15;Left   Other Exercises Quad sets 15x, 5 sec holds LLE   Bed Mobility    Scooting Supervised   Rolling Supervised   Neuro-Muscular Treatments   Comments Residual limb wrapping with figure 8 method and patellar lock. Education on positioning, avoidance of contractures, and residual limb care.    Interdisciplinary Plan of Care Collaboration   IDT Collaboration with  Nursing   Collaboration Comments POC, low BP, meds provided, manual /60 supine   PT Total Time Spent   PT Individual Total Time Spent (Mins) 30   PT Charge Group   PT Therapeutic Exercise 1   PT Therapeutic Activities 1       Assessment    Patient was receptive to education and training. Patient was not transferred OOB due to low BP sitting up in bed.     Plan    Continue monitoring BP and dizziness.  Progress safety with transfers, residual limb wrapping education, LLE ROM, and HEP.

## 2019-12-29 NOTE — THERAPY
Occupational Therapy  Daily Treatment     Patient Name: Israel Aviles  Age:  54 y.o., Sex:  male  Medical Record #: [id number]  Today's Date: 12/29/2019     Precautions  Precautions: Non Weight Bearing Left Lower Extremity, Fall Risk, Other (See Comments)  Comments: MDRO Contact precautions; L BKA, Hypotensive    Safety   ADL Safety : Requires Physical Assist for Safety, Requires Supervision for Safety       Objective       12/29/19 1401   Vitals   Vitals Comments Checked in with RN at start of session who stated BP is still too low for therapy. Pt informed OTR that his blood sugar was low too.   Interdisciplinary Plan of Care Collaboration   IDT Collaboration with  Nursing   Patient Position at End of Therapy In Bed;Family / Friend in Room;Phone within Reach;Tray Table within Reach;Call Light within Reach  (Pt's wife Augustina was present)   Collaboration Comments re: BP and hold   Therapy Missed   Missed Therapy (Minutes) 30       Assessment    Pt still not appropriate for therapy due to low blood pressure. Pt continues to have a hold.    Plan    Cont' dynamic standing balance, strengthening, endurance building, family training, and compensatory strategies to increase overall safety and independence with ADL/IADL pursuits.

## 2019-12-29 NOTE — CARE PLAN
Problem: Discharge Barriers/Planning  Goal: Patient's continuum of care needs will be met  Outcome: PROGRESSING SLOWER THAN EXPECTED  Note:   Pt having low blood pressures while sitting or standing today. Notified Dr. Nagel. Occupational therapy requests hold as pt is symptomatic. Pt reports feeling dizzy.

## 2019-12-30 ENCOUNTER — TELEPHONE (OUTPATIENT)
Dept: CARDIOLOGY | Facility: MEDICAL CENTER | Age: 54
End: 2019-12-30

## 2019-12-30 DIAGNOSIS — I25.10 CORONARY ARTERY DISEASE INVOLVING NATIVE CORONARY ARTERY OF NATIVE HEART, ANGINA PRESENCE UNSPECIFIED: ICD-10-CM

## 2019-12-30 LAB
ANION GAP SERPL CALC-SCNC: 8 MMOL/L (ref 0–11.9)
BUN SERPL-MCNC: 25 MG/DL (ref 8–22)
CALCIUM SERPL-MCNC: 9.1 MG/DL (ref 8.5–10.5)
CHLORIDE SERPL-SCNC: 98 MMOL/L (ref 96–112)
CO2 SERPL-SCNC: 26 MMOL/L (ref 20–33)
CREAT SERPL-MCNC: 0.74 MG/DL (ref 0.5–1.4)
ERYTHROCYTE [DISTWIDTH] IN BLOOD BY AUTOMATED COUNT: 58.1 FL (ref 35.9–50)
GLUCOSE BLD-MCNC: 142 MG/DL (ref 65–99)
GLUCOSE BLD-MCNC: 211 MG/DL (ref 65–99)
GLUCOSE BLD-MCNC: 225 MG/DL (ref 65–99)
GLUCOSE BLD-MCNC: 269 MG/DL (ref 65–99)
GLUCOSE BLD-MCNC: 98 MG/DL (ref 65–99)
GLUCOSE SERPL-MCNC: 109 MG/DL (ref 65–99)
HCT VFR BLD AUTO: 33.3 % (ref 42–52)
HGB BLD-MCNC: 10.4 G/DL (ref 14–18)
MAGNESIUM SERPL-MCNC: 2 MG/DL (ref 1.5–2.5)
MCH RBC QN AUTO: 27.7 PG (ref 27–33)
MCHC RBC AUTO-ENTMCNC: 31.2 G/DL (ref 33.7–35.3)
MCV RBC AUTO: 88.8 FL (ref 81.4–97.8)
NT-PROBNP SERPL IA-MCNC: 2535 PG/ML (ref 0–125)
PHOSPHATE SERPL-MCNC: 3.4 MG/DL (ref 2.5–4.5)
PLATELET # BLD AUTO: 394 K/UL (ref 164–446)
PMV BLD AUTO: 9 FL (ref 9–12.9)
POTASSIUM SERPL-SCNC: 4.7 MMOL/L (ref 3.6–5.5)
RBC # BLD AUTO: 3.75 M/UL (ref 4.7–6.1)
SODIUM SERPL-SCNC: 132 MMOL/L (ref 135–145)
WBC # BLD AUTO: 5.7 K/UL (ref 4.8–10.8)

## 2019-12-30 PROCEDURE — 97535 SELF CARE MNGMENT TRAINING: CPT

## 2019-12-30 PROCEDURE — 99233 SBSQ HOSP IP/OBS HIGH 50: CPT | Performed by: PHYSICAL MEDICINE & REHABILITATION

## 2019-12-30 PROCEDURE — 97110 THERAPEUTIC EXERCISES: CPT

## 2019-12-30 PROCEDURE — A9270 NON-COVERED ITEM OR SERVICE: HCPCS | Performed by: PHYSICAL MEDICINE & REHABILITATION

## 2019-12-30 PROCEDURE — 82962 GLUCOSE BLOOD TEST: CPT | Mod: 91

## 2019-12-30 PROCEDURE — 99232 SBSQ HOSP IP/OBS MODERATE 35: CPT | Performed by: HOSPITALIST

## 2019-12-30 PROCEDURE — A9270 NON-COVERED ITEM OR SERVICE: HCPCS | Performed by: HOSPITALIST

## 2019-12-30 PROCEDURE — 700102 HCHG RX REV CODE 250 W/ 637 OVERRIDE(OP): Performed by: PHYSICAL MEDICINE & REHABILITATION

## 2019-12-30 PROCEDURE — 84100 ASSAY OF PHOSPHORUS: CPT

## 2019-12-30 PROCEDURE — 85027 COMPLETE CBC AUTOMATED: CPT

## 2019-12-30 PROCEDURE — 770010 HCHG ROOM/CARE - REHAB SEMI PRIVAT*

## 2019-12-30 PROCEDURE — 97530 THERAPEUTIC ACTIVITIES: CPT

## 2019-12-30 PROCEDURE — 80048 BASIC METABOLIC PNL TOTAL CA: CPT

## 2019-12-30 PROCEDURE — 83880 ASSAY OF NATRIURETIC PEPTIDE: CPT

## 2019-12-30 PROCEDURE — 36415 COLL VENOUS BLD VENIPUNCTURE: CPT

## 2019-12-30 PROCEDURE — 700102 HCHG RX REV CODE 250 W/ 637 OVERRIDE(OP): Performed by: HOSPITALIST

## 2019-12-30 PROCEDURE — 83735 ASSAY OF MAGNESIUM: CPT

## 2019-12-30 PROCEDURE — 700111 HCHG RX REV CODE 636 W/ 250 OVERRIDE (IP): Performed by: PHYSICAL MEDICINE & REHABILITATION

## 2019-12-30 RX ORDER — VALSARTAN 80 MG/1
20 TABLET ORAL
Status: DISCONTINUED | OUTPATIENT
Start: 2019-12-31 | End: 2019-12-31

## 2019-12-30 RX ADMIN — VITAMIN D, TAB 1000IU (100/BT) 1000 UNITS: 25 TAB at 08:34

## 2019-12-30 RX ADMIN — ATORVASTATIN CALCIUM 80 MG: 40 TABLET, FILM COATED ORAL at 20:08

## 2019-12-30 RX ADMIN — GLIPIZIDE 2.5 MG: 5 TABLET ORAL at 17:42

## 2019-12-30 RX ADMIN — CLOPIDOGREL BISULFATE 75 MG: 75 TABLET ORAL at 20:08

## 2019-12-30 RX ADMIN — HEPARIN SODIUM 5000 UNITS: 5000 INJECTION, SOLUTION INTRAVENOUS; SUBCUTANEOUS at 14:32

## 2019-12-30 RX ADMIN — HEPARIN SODIUM 5000 UNITS: 5000 INJECTION, SOLUTION INTRAVENOUS; SUBCUTANEOUS at 05:30

## 2019-12-30 RX ADMIN — METFORMIN HYDROCHLORIDE 1000 MG: 500 TABLET ORAL at 17:42

## 2019-12-30 RX ADMIN — FERROUS SULFATE TAB 325 MG (65 MG ELEMENTAL FE) 325 MG: 325 (65 FE) TAB at 08:35

## 2019-12-30 RX ADMIN — ASPIRIN 81 MG: 81 TABLET, COATED ORAL at 08:38

## 2019-12-30 RX ADMIN — VALSARTAN 20 MG: 80 TABLET, FILM COATED ORAL at 05:30

## 2019-12-30 RX ADMIN — SPIRONOLACTONE 12.5 MG: 25 TABLET ORAL at 05:29

## 2019-12-30 RX ADMIN — CARVEDILOL 6.25 MG: 3.12 TABLET, FILM COATED ORAL at 08:35

## 2019-12-30 RX ADMIN — GLIPIZIDE 2.5 MG: 5 TABLET ORAL at 08:35

## 2019-12-30 RX ADMIN — HEPARIN SODIUM 5000 UNITS: 5000 INJECTION, SOLUTION INTRAVENOUS; SUBCUTANEOUS at 20:08

## 2019-12-30 RX ADMIN — METFORMIN HYDROCHLORIDE 1000 MG: 500 TABLET ORAL at 08:35

## 2019-12-30 ASSESSMENT — ENCOUNTER SYMPTOMS
ABDOMINAL PAIN: 0
COUGH: 0
EYES NEGATIVE: 1
FEVER: 0
NAUSEA: 0
BRUISES/BLEEDS EASILY: 0
VOMITING: 0
SHORTNESS OF BREATH: 0
PALPITATIONS: 0
CHILLS: 0

## 2019-12-30 NOTE — CARE PLAN
Problem: Safety  Goal: Will remain free from injury  Outcome: PROGRESSING AS EXPECTED  Note:   Pt is SBA for transfers. Uses call light when assistance is needed.   Goal: Will remain free from falls  Outcome: PROGRESSING AS EXPECTED     Problem: Infection  Goal: Will remain free from infection  Outcome: PROGRESSING AS EXPECTED     Problem: Pain Management  Goal: Pain level will decrease to patient's comfort goal  Outcome: PROGRESSING AS EXPECTED

## 2019-12-30 NOTE — CARE PLAN
Problem: Communication  Goal: The ability to communicate needs accurately and effectively will improve  Note:   Makes needs known to staff.  Encouraged to use call light for staff assist.      Problem: Safety  Goal: Will remain free from falls  Note:   Call light kept within reach and encouraged to use for assistance and with toileting needs. Encouraged to call staff and to wait for staff before transfers.      Problem: Acute Care of the Diabetic Patient  Goal: Optimal Outcome for the Diabetic Patient  Note:   Blood sugar checks ac and HS.  Pt is on oral diabetic medications.  Accuchecks tonight 200 mg/dL and 269 mg/dL.  Had previously refused his Glipizide and Glucophage at dinner time for a blood sugar of 129.  Pt requested to have Glipizide and Glucophage at 2344 tonight.  Ate his HS snack tonight.  Will continue to monitor patient.

## 2019-12-30 NOTE — THERAPY
"Occupational Therapy  Daily Treatment     Patient Name: Israel Aviles  Age:  54 y.o., Sex:  male  Medical Record #: [id number]  Today's Date: 12/30/2019     Precautions  Precautions: Non Weight Bearing Left Lower Extremity, Fall Risk, Other (See Comments), Immobilizer Left Lower Extremity  Comments: MDRO Contact precautions; L BKA, Hypotensive    Safety   ADL Safety : Requires Physical Assist for Safety, Requires Supervision for Safety    Subjective    \"a little lightheaded\" patient stated following set of bilateral row exercises     Objective     12/30/19 0831   Precautions   Precautions Non Weight Bearing Left Lower Extremity;Fall Risk;Other (See Comments)   Comments Contact precautions (MDRO), L BKA, hypotensive   Cognition    Level of Consciousness Alert   Sitting Upper Body Exercises   Scapular Depression 3 sets of 15;Bilateral;Weight (See Comments for lbs)  (Rickshaw (backward) 35#)   Bilateral Row 3 sets of 15;Bilateral;Weight (See Comments for lbs)  (Equalizer, 30#)   Tricep Press 3 sets of 15;Weight (See Comments for lbs);Bilateral  (Rickshaw (forward) 25#,30#,35#)   Interdisciplinary Plan of Care Collaboration   IDT Collaboration with  Nursing   Patient Position at End of Therapy Seated;Self Releasing Lap Belt Applied   Collaboration Comments Transferred care to nursing at end of session as pt had low BP   OT Total Time Spent   OT Individual Total Time Spent (Mins) 60   OT Charge Group   OT Self Care / ADL 2   OT Therapeutic Exercise  2     FIM Lower Body Dressing Score:  5 - Standby Prompting/Supervsion or Set-up  Lower Body Dressing Description:  Increased time, Set-up of equipment(Patient donned/doffed elastic waist pants with supervision (in bed))    FIM Toilet Transfer Score:  4 - Minimal Assistance  Toilet Transfer Description:  Increased time, Supervision for safety, Grab bar(Patient performed w/c <>toilet txfr with CGA for safety and balance (with use of grab bar))    Vitals taken at start of " session (due to hypotension precautions); 85/60 while long-sitting in bed. RN re-checked vitals manually; 104/70. At end of session/following UB exercises patient reported mild light headedness; BP 82/55. Returned patient to his room and transferred care to nursing.    Patient performed bed > w/c transfer with CGA.     Assessment    Patient tolerated OT session to the best of his abilities with focus on ADLs and UB strengthening. Demo'd ability to complete LB dressing in bed with supervision and completed toilet txfr with CGA (with use of grab bar). No c/o pain during UE exercises however c/o mild light headedness (after which vitals were re-checked, see above).     Plan    Monitor BP. Cont' dynamic standing balance, strengthening, endurance building, family training, and compensatory strategies to increase overall safety and independence with ADL/IADL pursuits.

## 2019-12-30 NOTE — PROGRESS NOTES
Received a call from infection control this morning. Pt now allowed to go outside the room for therapies and just needs equipment cleaned with bleach wipes. Pt educated of proper handwashing. Will remain on special contact precautions when in the room.

## 2019-12-30 NOTE — THERAPY
Physical Therapy   Daily Treatment     Patient Name: Israel Aviles  Age:  54 y.o., Sex:  male  Medical Record #: [id number]  Today's Date: 12/30/2019     Precautions  Precautions: Non Weight Bearing Left Lower Extremity, Fall Risk, Other (See Comments), Immobilizer Left Lower Extremity  Comments: MDRO Contact precautions; L BKA, Hypotensive    Subjective    Patient was agreeable to wrapping and education in supine. Patient reported mild dizziness.      Objective       12/30/19 0931   Precautions   Precautions Non Weight Bearing Left Lower Extremity;Fall Risk;Other (See Comments);Immobilizer Left Lower Extremity   Comments MDRO Contact precautions; L BKA, Hypotensive   Vitals   Pulse 95   Patient BP Position Supine   Blood Pressure (!) 92/60   Supine Lower Body Exercise   Hip Abduction 1 set of 10;Bilateral;Medium Resistance Theraband   Hip Adduction  1 set of 10;Bilateral  (pillow squeezes)   Short Arc Quad 1 set of 10;Left   Bed Mobility    Scooting Supervised   Rolling Supervised   Neuro-Muscular Treatments   Neuro-Muscular Treatments Weight Shift Left;Weight Shift Right;Verbal Cuing;Tactile Cuing;Sequencing   Comments Education on residual limb wrapping, with handout provided.  Ace wrapping to residual limb with 2 wraps, figure 8 method, and patellar lock. Handouts on BKA stetching, desensitization, and HEP provided and reviewed. List of prosthetic providers reviewed with patient to contact.    Interdisciplinary Plan of Care Collaboration   IDT Collaboration with  Physician;Nursing   Patient Position at End of Therapy In Bed;Call Light within Reach;Tray Table within Reach   Collaboration Comments Low BP   PT Total Time Spent   PT Individual Total Time Spent (Mins) 30   PT Charge Group   PT Therapeutic Exercise 1   PT Therapeutic Activities 1         Assessment    Patient was receptive to education, residual limb wrapping training, and completion of supine HEP (initiated yesterday).  Patient remains limited  by low BP and dizziness.    Plan    Continue to progress OOB tolerance while monitoring BP.  Initiate family training for residual limb wrapping, and transfers. Review and progress HEP.

## 2019-12-30 NOTE — TELEPHONE ENCOUNTER
Message   Received: 1 week ago   Message Contents   MAXIMO Ndiaye R.N. Tina please forward these to 's RN.       Mahesh requested viability study on this patient, currently still inpatient, am hoping sometime next week, the way you order this is to order nuclear med cardiac stress testing but definitely specifically state for viability study.  If any questions let me know please.  And after viability study the patient needs to see MAHESH himself (would you please schedule an appointment with him) for more discussions and evaluation of his coronary arterial disease.     Thank you so much      Exam ordered and hand carried to scheduling to monitor/call patient to schedule

## 2019-12-30 NOTE — PROGRESS NOTES
Received bedside shift report from Radha TORRES RN regarding patient and assumed care. Patient awake, calm and stable, currently positioned in bed for comfort and safety; call light within reach. Denies pain or discomfort at this time. Will continue to monitor.

## 2019-12-30 NOTE — THERAPY
"Occupational Therapy  Daily Treatment     Patient Name: Israel Aviles  Age:  54 y.o., Sex:  male  Medical Record #: [id number]  Today's Date: 12/30/2019     Precautions  Precautions: (P) Non Weight Bearing Left Lower Extremity, Fall Risk, Other (See Comments), Immobilizer Left Lower Extremity  Comments: (P) MDRO Contact precautions; L BKA, Hypotensive    Safety   ADL Safety : Requires Physical Assist for Safety, Requires Supervision for Safety    Subjective    \"I don't know why I'm so lazy\" patient stated referring to feeling fatigued      Objective       12/30/19 1431   Precautions   Precautions Non Weight Bearing Left Lower Extremity;Fall Risk;Other (See Comments);Immobilizer Left Lower Extremity   Comments MDRO Contact precautions; L BKA, Hypotensive   Vitals   Patient BP Position Sitting   Blood Pressure (!) 86/56  (RN notified)   O2 Delivery None (Room Air)   Cognition    Level of Consciousness Alert   Sitting Upper Body Exercises   Comments See therex note below   Interdisciplinary Plan of Care Collaboration   Patient Position at End of Therapy In Bed;Call Light within Reach;Family / Friend in Room   OT Total Time Spent   OT Individual Total Time Spent (Mins) 30   OT Charge Group   OT Therapeutic Exercise  2     Therexs: Patient performed UB strengthening exercises using theraband with rest breaks following 3-4 repetitions due to significant fatigue/decreased endurance. Pt completed bilateral shoulder flexion, abd/adduction and horizontal abd/adduction exercises while seated in w/c (1 set of 10 for each exercises with frequent rest breaks).    Patient performed wc<>bed txfr with CGA.     Assessment    Patient with decreased activity tolerance this session. Required frequent rest breaks during therex's due to fatigue/decreased endurance. RN informed re: fatigue and low BP.     Plan    Monitor BP. Cont' dynamic standing balance, strengthening, endurance building, family training, and compensatory strategies " to increase overall safety and independence with ADL/IADL pursuits.

## 2019-12-30 NOTE — THERAPY
Missed Therapy     Patient Name: Israel Aviles  Age:  54 y.o., Sex:  male  Medical Record #: [id number]  Today's Date: 12/30/2019    Discussed missed therapy with  RN, OT, and MD       12/30/19 0689   Interdisciplinary Plan of Care Collaboration   IDT Collaboration with  Occupational Therapist;Nursing;Physician   Patient Position at End of Therapy In Bed;Family / Friend in Room   Collaboration Comments Medical hold, low BP, feeling unable to participate, very lethargic   Therapy Missed   Missed Therapy (Minutes) 60   Reason For Missed Therapy Medical - Patient on Hold from Therapy

## 2019-12-30 NOTE — PROGRESS NOTES
[facility] Medicine Daily Progress Note    Date of Service  12/30/2019    Chief Complaint  CHF, HTN, DM    Interval Problem Update  Blood pressures better but still low while working w/ therapies.    Review of Systems  Review of Systems   Constitutional: Negative for chills and fever.   HENT: Negative.    Eyes: Negative.    Respiratory: Negative for cough and shortness of breath.    Cardiovascular: Negative for chest pain and palpitations.   Gastrointestinal: Negative for abdominal pain, nausea and vomiting.   Genitourinary: Negative.    Musculoskeletal:        Wound pain   Skin: Negative for itching and rash.   Endo/Heme/Allergies: Does not bruise/bleed easily.        Physical Exam  Temp:  [36.4 °C (97.6 °F)-36.7 °C (98 °F)] 36.6 °C (97.8 °F)  Pulse:  [] 76  Resp:  [16-20] 20  BP: ()/(58-70) 87/60  SpO2:  [97 %-98 %] 97 %    Physical Exam  Vitals signs reviewed.   Constitutional:       General: He is not in acute distress.     Appearance: Normal appearance. He is not ill-appearing.   HENT:      Head: Normocephalic and atraumatic.      Right Ear: External ear normal.      Left Ear: External ear normal.      Nose: Nose normal.   Eyes:      General:         Right eye: No discharge.         Left eye: No discharge.      Extraocular Movements: Extraocular movements intact.      Conjunctiva/sclera: Conjunctivae normal.   Neck:      Musculoskeletal: Normal range of motion and neck supple.   Cardiovascular:      Rate and Rhythm: Normal rate and regular rhythm.   Pulmonary:      Effort: Pulmonary effort is normal. No respiratory distress.      Breath sounds: Normal breath sounds. No wheezing.   Abdominal:      General: Abdomen is flat. Bowel sounds are normal.      Palpations: Abdomen is soft.   Musculoskeletal:      Comments: Left BKA dressed   Skin:     General: Skin is warm and dry.   Neurological:      General: No focal deficit present.      Mental Status: He is alert and oriented to person, place, and time.          Fluids    Intake/Output Summary (Last 24 hours) at 12/30/2019 1120  Last data filed at 12/30/2019 1000  Gross per 24 hour   Intake 840 ml   Output --   Net 840 ml       Laboratory  Recent Labs     12/28/19  0554 12/30/19  0649   WBC 8.4 5.7   RBC 3.74* 3.75*   HEMOGLOBIN 10.0* 10.4*   HEMATOCRIT 32.8* 33.3*   MCV 87.7 88.8   MCH 26.7* 27.7   MCHC 30.5* 31.2*   RDW 54.5* 58.1*   PLATELETCT 437 394   MPV 9.1 9.0     Recent Labs     12/28/19  0554 12/30/19  0649   SODIUM 132* 132*   POTASSIUM 5.0 4.7   CHLORIDE 99 98   CO2 27 26   GLUCOSE 138* 109*   BUN 19 25*   CREATININE 0.77 0.74   CALCIUM 8.9 9.1                   Assessment/Plan  * S/P BKA (below knee amputation) unilateral, left (HCC)  Assessment & Plan  Left foot osteomyelitis S/P BKA on 12/20/19 by Dr. Zhao  Completed intravenous antimicrobial therapy per Infectious Diseases  Wound care and pain control    HTN (hypertension)  Assessment & Plan  Decreased Valsartan and Aldactone for low blood pressures and high trending K+  Will decrease Valsartan further due to persistent hypotension  Continue current dose Coreg as HR in the high 90's    CAD (coronary artery disease)  Assessment & Plan  S/P NSTEMI w/ Echo showing EF 45%  Has three vessel coronary artery disease per Cardiac Cath on 12/16/19 w/ recommendations for medical management  On ASA, Lipitor, Valsartan, Coreg, and Aldactone  BNP improving  Outpt Cardiology F/U    Vitamin D deficiency  Assessment & Plan  Vit D level 20  Continue supplementation    Normocytic anemia- (present on admission)  Assessment & Plan  Pt Hb dropped 3 g between June 2018 and June 2019  Continue Fe supplements for low iron stores  Consider outpt GI consult in this 53 yo male w/ a fairly rapid drop in H/H due to CURTIS    Uncontrolled type 2 diabetes mellitus with hyperglycemia (HCC)- (present on admission)  Assessment & Plan  HbA1c 8.5  Discontinued Lantus for hypoglycemic trends  On Metformin and Glipizide, slowly dose  titrate up to home regimen  Outpt meds include Metformin 1000 mg bid and Glipizide 5 mg bid    PVD (peripheral vascular disease) (HCC)  Assessment & Plan  Has h/o prior LLE revascularization  On ASA, Plavix, and Lipitor  Outpt Vascular F/U    Full Code

## 2019-12-30 NOTE — PROGRESS NOTES
Pt requested to have his blood sugar checked again at this time.  Accucheck results were 269 mg/dL.  Requested to have his Glipizide and Glucophage, he had previously refused them at dinner time with a blood sugar of 129.  Meds given as per request, pt ate his HS snack of yogurt.  Will continue to monitor patient.

## 2019-12-31 PROBLEM — R79.89 AZOTEMIA: Status: ACTIVE | Noted: 2019-12-31

## 2019-12-31 LAB
GLUCOSE BLD-MCNC: 122 MG/DL (ref 65–99)
GLUCOSE BLD-MCNC: 174 MG/DL (ref 65–99)
GLUCOSE BLD-MCNC: 209 MG/DL (ref 65–99)
GLUCOSE BLD-MCNC: 226 MG/DL (ref 65–99)

## 2019-12-31 PROCEDURE — 700102 HCHG RX REV CODE 250 W/ 637 OVERRIDE(OP): Performed by: HOSPITALIST

## 2019-12-31 PROCEDURE — 97530 THERAPEUTIC ACTIVITIES: CPT

## 2019-12-31 PROCEDURE — 97110 THERAPEUTIC EXERCISES: CPT

## 2019-12-31 PROCEDURE — 700102 HCHG RX REV CODE 250 W/ 637 OVERRIDE(OP): Performed by: PHYSICAL MEDICINE & REHABILITATION

## 2019-12-31 PROCEDURE — 97535 SELF CARE MNGMENT TRAINING: CPT

## 2019-12-31 PROCEDURE — 99233 SBSQ HOSP IP/OBS HIGH 50: CPT | Performed by: PHYSICAL MEDICINE & REHABILITATION

## 2019-12-31 PROCEDURE — 97597 DBRDMT OPN WND 1ST 20 CM/<: CPT

## 2019-12-31 PROCEDURE — 99232 SBSQ HOSP IP/OBS MODERATE 35: CPT | Performed by: HOSPITALIST

## 2019-12-31 PROCEDURE — A9270 NON-COVERED ITEM OR SERVICE: HCPCS | Performed by: HOSPITALIST

## 2019-12-31 PROCEDURE — A9270 NON-COVERED ITEM OR SERVICE: HCPCS | Performed by: PHYSICAL MEDICINE & REHABILITATION

## 2019-12-31 PROCEDURE — 700111 HCHG RX REV CODE 636 W/ 250 OVERRIDE (IP): Performed by: PHYSICAL MEDICINE & REHABILITATION

## 2019-12-31 PROCEDURE — 770010 HCHG ROOM/CARE - REHAB SEMI PRIVAT*

## 2019-12-31 PROCEDURE — 82962 GLUCOSE BLOOD TEST: CPT | Mod: 91

## 2019-12-31 RX ORDER — VALSARTAN 80 MG/1
TABLET ORAL
Status: ACTIVE
Start: 2019-12-31 | End: 2019-12-31

## 2019-12-31 RX ADMIN — GLIPIZIDE 2.5 MG: 5 TABLET ORAL at 09:07

## 2019-12-31 RX ADMIN — METFORMIN HYDROCHLORIDE 1000 MG: 500 TABLET ORAL at 18:01

## 2019-12-31 RX ADMIN — ATORVASTATIN CALCIUM 80 MG: 40 TABLET, FILM COATED ORAL at 20:04

## 2019-12-31 RX ADMIN — FERROUS SULFATE TAB 325 MG (65 MG ELEMENTAL FE) 325 MG: 325 (65 FE) TAB at 09:08

## 2019-12-31 RX ADMIN — SENNOSIDES AND DOCUSATE SODIUM 2 TABLET: 8.6; 5 TABLET ORAL at 20:05

## 2019-12-31 RX ADMIN — GLIPIZIDE 2.5 MG: 5 TABLET ORAL at 18:02

## 2019-12-31 RX ADMIN — HEPARIN SODIUM 5000 UNITS: 5000 INJECTION, SOLUTION INTRAVENOUS; SUBCUTANEOUS at 13:41

## 2019-12-31 RX ADMIN — HEPARIN SODIUM 5000 UNITS: 5000 INJECTION, SOLUTION INTRAVENOUS; SUBCUTANEOUS at 05:15

## 2019-12-31 RX ADMIN — VALSARTAN 20 MG: 80 TABLET, FILM COATED ORAL at 06:24

## 2019-12-31 RX ADMIN — METFORMIN HYDROCHLORIDE 1000 MG: 500 TABLET ORAL at 09:06

## 2019-12-31 RX ADMIN — ASPIRIN 81 MG: 81 TABLET, COATED ORAL at 09:07

## 2019-12-31 RX ADMIN — SPIRONOLACTONE 12.5 MG: 25 TABLET ORAL at 06:23

## 2019-12-31 RX ADMIN — HEPARIN SODIUM 5000 UNITS: 5000 INJECTION, SOLUTION INTRAVENOUS; SUBCUTANEOUS at 20:04

## 2019-12-31 RX ADMIN — VITAMIN D, TAB 1000IU (100/BT) 1000 UNITS: 25 TAB at 09:07

## 2019-12-31 RX ADMIN — CLOPIDOGREL BISULFATE 75 MG: 75 TABLET ORAL at 20:05

## 2019-12-31 ASSESSMENT — ENCOUNTER SYMPTOMS
SHORTNESS OF BREATH: 0
NERVOUS/ANXIOUS: 0
CHILLS: 0
NAUSEA: 0
FEVER: 0
DIARRHEA: 0
VOMITING: 0
ABDOMINAL PAIN: 0

## 2019-12-31 NOTE — REHAB-PT IDT TEAM NOTE
Physical Therapy   Mobility  Bed mobility:  SPV  Bed /Chair/Wheelchair Transfer Initial:  4 - Minimal Assistance  Bed /Chair/Wheelchair Transfer Current:  0 - Not tested,unsafe activity   Bed/Chair/Wheelchair Transfer Description:  Supervision for safety, Increased time(CGA SPT <> EOB)  Walk Initial:  1 - Total Assistance  Walk Current:  0 - Not tested,medical condition   Walk Description:  (2x 22 feet ambulation with FWW, gait belt, CGA, hop-to pattern, LLE immobilizer)  Wheelchair Initial:  1 - Total Assistance  Wheelchair Current:  0 - Not tested,medical condition   Wheelchair Description:  (indoors today for 105 feet and 45 feet; limited by endurance; setup for L swing-away and R standard legrests; SPV; increased time; cueing for path finding)  Stairs Initial:  0 - Not tested,unsafe activity  Stairs Current: 0 - Not tested,medical condition   Stairs Description:    Patient/Family Training/Education:  Patient educated on residual limb wrapping, supine HEP, contracture susceptibility/ positioning.   DME/DC Recommendations:  Anticipate home and HH services; recommend extending patient's 15 hours of therapies provided over a 7 day (week) period   Strengths:  Independent PLOF, Manages pain appropriately, Motivated for self care and independence, Pleasant and cooperative, Supportive family and Willingly participates in therapeutic activities  Barriers:   Decreased endurance, Limited mobility, Poor activity tolerance, Poor balance and Other: Low BP/medical hold  # of short term goals set= 5  # of short term goals met= 0  Physical Therapy Problems     Problem: Mobility     Dates: Start: 12/28/19       Description:     Goal: STG-Within one week, patient will propel wheelchair community     Dates: Start: 12/28/19       Description: 1) Individualized goal:  2x 150 feet at SPV level indoors or Min A outdoors with ramps  2) Interventions:  PT Group Therapy, PT Prosthetic Training, PT Gait Training, PT Self Care/Home Eval, PT  Therapeutic Exercises, PT TENS Application, PT Neuro Re-Ed/Balance, PT Therapeutic Activity and PT Manual Therapy     Note:     Goal Note filed on 12/30/19 1653 by Montse Cabrera DPT    Low BP, medical hold, dec activity tolerance                    Goal: STG-Within one week, patient will ambulate household distance     Dates: Start: 12/30/19       Description: 1) Individualized goal:  30 ft with FWW CGA-SBA indoors   2) Interventions:  PT Group Therapy, PT E Stim Attended, PT Gait Training, PT Therapeutic Exercises, PT Neuro Re-Ed/Balance, PT Aquatic Therapy, PT Therapeutic Activity and PT Manual Therapy                   Problem: Mobility Transfers     Dates: Start: 12/28/19       Description:     Goal: STG-Within one week, patient will transfer bed to chair     Dates: Start: 12/28/19       Description: 1) Individualized goal: With FWW at SPV level  2) Interventions: PT Group Therapy, PT Prosthetic Training, PT Gait Training, PT Self Care/Home Eval, PT Therapeutic Exercises, PT TENS Application, PT Neuro Re-Ed/Balance, PT Therapeutic Activity and PT Manual Therapy       Note:     Goal Note filed on 12/30/19 1620 by Montse Cabrera DPT    CGA SPT, SPV bed mobility  Further assessment/ progression limited by low BP, and dizziness  Medical hold                        Problem: PT-Long Term Goals     Dates: Start: 12/28/19       Description:     Goal: LTG-By discharge, patient will propel wheelchair     Dates: Start: 12/28/19       Description: 1) Individualized goal:  300 feet indoors/outdoors at Mod I level  2) Interventions:  PT Group Therapy, PT Prosthetic Training, PT Gait Training, PT Self Care/Home Eval, PT Therapeutic Exercises, PT TENS Application, PT Neuro Re-Ed/Balance, PT Therapeutic Activity and PT Manual Therapy           Goal: LTG-By discharge, patient will ambulate     Dates: Start: 12/28/19       Description: 1) Individualized goal:  with FWW for 25 feet indoors at SPV level  2) Interventions:  PT Group  Therapy, PT Prosthetic Training, PT Gait Training, PT Self Care/Home Eval, PT Therapeutic Exercises, PT TENS Application, PT Neuro Re-Ed/Balance, PT Therapeutic Activity and PT Manual Therapy             Goal: LTG-By discharge, patient will transfer one surface to another     Dates: Start: 12/28/19       Description: 1) Individualized goal:  with FWW at Mod I level for bed transfers  2) Interventions:  PT Group Therapy, PT Prosthetic Training, PT Gait Training, PT Self Care/Home Eval, PT Therapeutic Exercises, PT TENS Application, PT Neuro Re-Ed/Balance, PT Therapeutic Activity and PT Manual Therapy               Goal: LTG-By discharge, patient will transfer in/out of a car     Dates: Start: 12/28/19       Description: 1) Individualized goal:  with FWW at SPV level for car transfers  2) Interventions:  PT Group Therapy, PT Prosthetic Training, PT Gait Training, PT Self Care/Home Eval, PT Therapeutic Exercises, PT TENS Application, PT Neuro Re-Ed/Balance, PT Therapeutic Activity and PT Manual Therapy                           Section completed by:  Montse Cabrera PT, DPT

## 2019-12-31 NOTE — REHAB-NURSING IDT TEAM NOTE
Nursing   Nursing  Diet/Nutrition:  Diabetic  Medication Administration:  Whole with Liquid Wash  % consumed at meals in last 24 hours:  Consumed 50-75% of meals per documentation.  Meal/Snack Consumption for the past 24 hrs:   Oral Nutrition   12/30/19 1000 Between 50-75% Consumed     Snack schedule:  HS  Supplement:  Other: none  Appetite:  Fair  Fluid Intake/Output in past 24 hours: In: 720 [P.O.:720]  Out: -   Admit Weight:  Weight: 78.6 kg (173 lb 4.5 oz)  Weight Last 7 Days   [68.5 kg (151 lb 0.2 oz)-78.6 kg (173 lb 4.5 oz)] 68.5 kg (151 lb 0.2 oz) (12/29 1000)    Pain Issues:    Location:  --  --         Severity:  Denies- moderate    Scheduled pain medications:  None     PRN pain medications used in last 24 hours:  None   Non Pharmacologic Interventions:  repositioned and rest  Effectiveness of pain management plan:  good=patient states acceptable comfort after interventions    Bowel:    Bowel Assist Initial Score:  3 - Moderate Assistance  Bowel Assist Current Score:  3 - Moderate Assistance  Bowl Accidents in last 7 days:     Last bowel movement: 12/28/19        Usual bowel pattern:  no bowel movement in greater than 3 days  Scheduled bowel medications:  senna-docusate (PERICOLACE or SENOKOT S) - pt refusing scheduled bowel medication.   PRN bowel medications used in last 24 hours:  None- pt to take PRN bowel medication in AM  Nursing Interventions:  Increased time, Positioning on commode/toilet, Scheduled medication, Supervision, Verbal cueing, Set-up  Effectiveness of bowel program:   poor=greater than 2 days with no bowel movement as pt is refusing scheduled bowel medication   Bladder:    Bladder Assist Initial Score:  3 - Moderate Assistance  Bladder Assist Current Score:  3 - Moderate Assistance  Bladder Accidents in last 7 days:     PVR range for past 24-48 hours: -- ()  Intermittent Catheterization:  na  Medications affecting bladder:  None    Time void schedule/voiding pattern:  Voiding every 2-4  hours  Interventions:  Increased time, Emptying of device, Urinal, Time void patient initiated  Effectiveness of bladder training:  Good=regular, predictable, emptying of bladder, patient initiates time voiding    [place]:    Type:     Patient Lines/Drains/Airways Status    Active Catheter     None              Date placed:          Justification:    Diabetes:  Blood Sugar Frequency:  Before meals and at bedtime    Range of BS for last 48 hours:     Recent Labs     12/29/19  0800 12/29/19  1139 12/29/19  1332 12/29/19  1405 12/29/19  1543 12/29/19  1803 12/29/19  2055 12/29/19  2340 12/30/19  0722 12/30/19  1125 12/30/19  1704 12/30/19 2006   POCGLUCOSE 80 85 51* 69 102* 129* 200* 269* 98 142* 211* 225*     Scheduled diabetic medications:  metformin (GLUCOPHAGE)  and Other Glipizide- 2.5 mg 2 times daily   Sliding scale usage in past 24 hours:  No  Total Short acting insulin in the past 24 hours:  None  Total Long acting insulin in the past 24 hours:  None    Wound:         Patient Lines/Drains/Airways Status    Active Current Wounds     Name: Placement date: Placement time: Site: Days:    Incision- L-BKA   Pressure injury- coccyx   Pressure injury-Heel  12/27/19 1700   --  3                   Interventions:  Q shift assessment, Site care, dressing change   Effectiveness of intervention:  wound is improving     Wound Vac Location:  Not applicable  Dressing last changed:  Not applicable  Pump Mode Pressure Setting       Description of drainage:  Not applicable    Sleep/wake cycle:   Average hours slept:  Sleeps greater than 6 hours without waking  Sleep medication usage:  None    Patient/Family Training/Education:  Diabetes management, safe transfer, pain management, wound care  Discharge Supply Recommendations:  Glucometer and Strips, Blood Pressure Monitor and Wound Care Supplies  Strengths: Alert and oriented, Willingly participates in therapeutic activities, Able to follow instructions, Pleasant and cooperative,  Effective communication skills, Good carryover of learning, Motivated for self care and independence, Good endurance and Manages pain appropriately   Barriers:   Constipation, Fatigue, Generalized weakness and Hypotension       Nursing Problems     Problem: Bowel/Gastric:     Description:     Goal: Normal bowel function is maintained or improved     Description:           Goal: Will not experience complications related to bowel motility     Description:                 Problem: Communication     Description:     Goal: The ability to communicate needs accurately and effectively will improve     Description:                 Problem: Discharge Barriers/Planning     Description:     Goal: Patient's continuum of care needs will be met     Description:                 Problem: Infection     Description:     Goal: Will remain free from infection     Description:                 Problem: Knowledge Deficit     Description:     Goal: Knowledge of disease process/condition, treatment plan, diagnostic tests, and medications will improve     Description:           Goal: Knowledge of the prescribed therapeutic regimen will improve     Description:                 Problem: Pain Management     Description:     Goal: Pain level will decrease to patient's comfort goal     Description:                 Problem: Safety     Description:     Goal: Will remain free from injury     Description:           Goal: Will remain free from falls     Description:                 Problem: Venous Thromboembolism (VTW)/Deep Vein Thrombosis (DVT) Prevention:     Description:     Goal: Patient will participate in Venous Thrombosis (VTE)/Deep Vein Thrombosis (DVT)Prevention Measures     Description:                        Long Term Goals:   At discharge patient will be able to function safely at home and in the community with support.    Section completed by:  Amy May R.N.

## 2019-12-31 NOTE — REHAB-COLLABORATIVE ONGOING IDT TEAM NOTE
Weekly Interdisciplinary Team Conference Note    Weekly Interdisciplinary Team Conference # 1  Date:  12/31/2019    Clinicians present and reporting at team conference include the following:   MD: MARITZA Bai MD    RN:  Radha Fraser RN   PT:   Ba Victoria PT, DPT  OT:  Shameka Mckinley OT and Savana Tobias MS, OT/L   ST:  Not Applicable.   CM:  Peggy Sigala RN [facility]  REC:  None  RT:  None  RPh:  Kaitlynn Zaldivar [facility]  Other:   None  All reporting clinicians have a working knowledge of this patient's plan of care.    Targeted DC Date:  1/9/20     Medical    Patient Active Problem List    Diagnosis Date Noted   • Osteomyelitis of left foot ([facility]) 08/21/2019     Priority: High   • PVD (peripheral vascular disease) ([facility]) 06/21/2019     Priority: Low   • Azotemia 12/31/2019   • Vitamin D deficiency 12/28/2019   • CAD (coronary artery disease) 12/28/2019   • HTN (hypertension) 12/28/2019   • S/P BKA (below knee amputation) unilateral, left ([facility]) 12/27/2019   • Normocytic anemia 08/21/2019   • Diabetic foot ([facility]) 08/21/2019   • Pressure injury of deep tissue of left foot 07/30/2019   • Diabetic ulcer of toe of left foot associated with type 2 diabetes mellitus, with necrosis of muscle ([facility]) 07/30/2019   • Diabetic foot ulcer ([facility]) 07/07/2019   • Diabetic polyneuropathy associated with type 2 diabetes mellitus ([facility]) 07/07/2019   • Wound infection 05/24/2019   • Uncontrolled type 2 diabetes mellitus with hyperglycemia ([facility]) 05/14/2019   • Hyperlipidemia 05/14/2019   • Acute ischemic stroke ([facility]) 06/15/2018     Results     Procedure Component Value Ref Range Date/Time    ACCU-CHEK GLUCOSE [[id number]]  (Abnormal) Collected:  12/31/19 1140    Order Status:  Completed Lab Status:  Final result Updated:  12/31/19 1318     Glucose - Accu-Ck 209 65 - 99 mg/dL     ACCU-CHEK GLUCOSE [[id number]]  (Abnormal) Collected:  12/31/19 0801    Order Status:  Completed Lab Status:  Final result Updated:  12/31/19 0815     Glucose -  Accu-Ck 122 65 - 99 mg/dL     ACCU-CHEK GLUCOSE [[id number]]  (Abnormal) Collected:  12/30/19 2006    Order Status:  Completed Lab Status:  Final result Updated:  12/30/19 2155     Glucose - Accu-Ck 225 65 - 99 mg/dL     ACCU-CHEK GLUCOSE [[id number]]  (Abnormal) Collected:  12/30/19 1125    Order Status:  Completed Lab Status:  Final result Updated:  12/30/19 1717     Glucose - Accu-Ck 142 65 - 99 mg/dL     ACCU-CHEK GLUCOSE [[id number]]  (Abnormal) Collected:  12/30/19 1704    Order Status:  Completed Lab Status:  Final result Updated:  12/30/19 1717     Glucose - Accu-Ck 211 65 - 99 mg/dL         Nursing  Diet/Nutrition:  Diabetic  Medication Administration:  Whole with Liquid Wash  % consumed at meals in last 24 hours:  Consumed 50-75% of meals per documentation.  Meal/Snack Consumption for the past 24 hrs:   Oral Nutrition   12/30/19 1000 Between 50-75% Consumed     Snack schedule:  HS  Supplement:  Other: none  Appetite:  Fair  Fluid Intake/Output in past 24 hours: In: 720 [P.O.:720]  Out: -   Admit Weight:  Weight: 78.6 kg (173 lb 4.5 oz)  Weight Last 7 Days   [68.5 kg (151 lb 0.2 oz)-78.6 kg (173 lb 4.5 oz)] 68.5 kg (151 lb 0.2 oz) (12/29 1000)    Pain Issues:    Location:  --  --         Severity:  Denies- moderate    Scheduled pain medications:  None     PRN pain medications used in last 24 hours:  None   Non Pharmacologic Interventions:  repositioned and rest  Effectiveness of pain management plan:  good=patient states acceptable comfort after interventions    Bowel:    Bowel Assist Initial Score:  3 - Moderate Assistance  Bowel Assist Current Score:  3 - Moderate Assistance  Bowl Accidents in last 7 days:     Last bowel movement: 12/28/19        Usual bowel pattern:  no bowel movement in greater than 3 days  Scheduled bowel medications:  senna-docusate (PERICOLACE or SENOKOT S) - pt refusing scheduled bowel medication.   PRN bowel medications used in last 24 hours:  None- pt to take PRN bowel medication in  AM  Nursing Interventions:  Increased time, Positioning on commode/toilet, Scheduled medication, Supervision, Verbal cueing, Set-up  Effectiveness of bowel program:   poor=greater than 2 days with no bowel movement as pt is refusing scheduled bowel medication   Bladder:    Bladder Assist Initial Score:  3 - Moderate Assistance  Bladder Assist Current Score:  3 - Moderate Assistance  Bladder Accidents in last 7 days:     PVR range for past 24-48 hours: -- ()  Intermittent Catheterization:  na  Medications affecting bladder:  None    Time void schedule/voiding pattern:  Voiding every 2-4 hours  Interventions:  Increased time, Emptying of device, Urinal, Time void patient initiated  Effectiveness of bladder training:  Good=regular, predictable, emptying of bladder, patient initiates time voiding    [place]:    Type:     Patient Lines/Drains/Airways Status    Active Catheter     None              Date placed:          Justification:    Diabetes:  Blood Sugar Frequency:  Before meals and at bedtime    Range of BS for last 48 hours:     Recent Labs     12/29/19  0800 12/29/19  1139 12/29/19  1332 12/29/19  1405 12/29/19  1543 12/29/19  1803 12/29/19  2055 12/29/19  2340 12/30/19  0722 12/30/19  1125 12/30/19  1704 12/30/19 2006   POCGLUCOSE 80 85 51* 69 102* 129* 200* 269* 98 142* 211* 225*     Scheduled diabetic medications:  metformin (GLUCOPHAGE)  and Other Glipizide- 2.5 mg 2 times daily   Sliding scale usage in past 24 hours:  No  Total Short acting insulin in the past 24 hours:  None  Total Long acting insulin in the past 24 hours:  None    Wound:         Patient Lines/Drains/Airways Status    Active Current Wounds     Name: Placement date: Placement time: Site: Days:    Incision- L-BKA   Pressure injury- coccyx   Pressure injury-Heel  12/27/19   1700   --  3                   Interventions:  Q shift assessment, Site care, dressing change   Effectiveness of intervention:  wound is improving     Wound Vac Location:   Not applicable  Dressing last changed:  Not applicable  Pump Mode Pressure Setting       Description of drainage:  Not applicable    Sleep/wake cycle:   Average hours slept:  Sleeps greater than 6 hours without waking  Sleep medication usage:  None    Patient/Family Training/Education:  Diabetes management, safe transfer, pain management, wound care  Discharge Supply Recommendations:  Glucometer and Strips, Blood Pressure Monitor and Wound Care Supplies  Strengths: Alert and oriented, Willingly participates in therapeutic activities, Able to follow instructions, Pleasant and cooperative, Effective communication skills, Good carryover of learning, Motivated for self care and independence, Good endurance and Manages pain appropriately   Barriers:   Constipation, Fatigue, Generalized weakness and Hypotension       Nursing Problems     Problem: Bowel/Gastric:     Description:     Goal: Normal bowel function is maintained or improved     Description:           Goal: Will not experience complications related to bowel motility     Description:                 Problem: Communication     Description:     Goal: The ability to communicate needs accurately and effectively will improve     Description:                 Problem: Discharge Barriers/Planning     Description:     Goal: Patient's continuum of care needs will be met     Description:                 Problem: Infection     Description:     Goal: Will remain free from infection     Description:                 Problem: Knowledge Deficit     Description:     Goal: Knowledge of disease process/condition, treatment plan, diagnostic tests, and medications will improve     Description:           Goal: Knowledge of the prescribed therapeutic regimen will improve     Description:                 Problem: Pain Management     Description:     Goal: Pain level will decrease to patient's comfort goal     Description:                 Problem: Safety     Description:     Goal: Will  remain free from injury     Description:           Goal: Will remain free from falls     Description:                 Problem: Venous Thromboembolism (VTW)/Deep Vein Thrombosis (DVT) Prevention:     Description:     Goal: Patient will participate in Venous Thrombosis (VTE)/Deep Vein Thrombosis (DVT)Prevention Measures     Description:                        Long Term Goals:   At discharge patient will be able to function safely at home and in the community with support.    Section completed by:  Amy May R.N.           Mobility  Bed mobility:  SPV  Bed /Chair/Wheelchair Transfer Initial:  4 - Minimal Assistance  Bed /Chair/Wheelchair Transfer Current:  0 - Not tested,unsafe activity   Bed/Chair/Wheelchair Transfer Description:  Supervision for safety, Increased time(CGA SPT <> EOB)  Walk Initial:  1 - Total Assistance  Walk Current:  0 - Not tested,medical condition   Walk Description:  (2x 22 feet ambulation with FWW, gait belt, CGA, hop-to pattern, LLE immobilizer)  Wheelchair Initial:  1 - Total Assistance  Wheelchair Current:  0 - Not tested,medical condition   Wheelchair Description:  (indoors today for 105 feet and 45 feet; limited by endurance; setup for L swing-away and R standard legrests; SPV; increased time; cueing for path finding)  Stairs Initial:  0 - Not tested,unsafe activity  Stairs Current: 0 - Not tested,medical condition   Stairs Description:    Patient/Family Training/Education:  Patient educated on residual limb wrapping, supine HEP, contracture susceptibility/ positioning.   DME/DC Recommendations:  Anticipate home and HH services; recommend extending patient's 15 hours of therapies provided over a 7 day (week) period   Strengths:  Independent PLOF, Manages pain appropriately, Motivated for self care and independence, Pleasant and cooperative, Supportive family and Willingly participates in therapeutic activities  Barriers:   Decreased endurance, Limited mobility, Poor  activity tolerance, Poor balance and Other: Low BP/medical hold  # of short term goals set= 5  # of short term goals met= 0  Physical Therapy Problems     Problem: Mobility     Dates: Start: 12/28/19       Description:     Goal: STG-Within one week, patient will propel wheelchair community     Dates: Start: 12/28/19       Description: 1) Individualized goal:  2x 150 feet at SPV level indoors or Min A outdoors with ramps  2) Interventions:  PT Group Therapy, PT Prosthetic Training, PT Gait Training, PT Self Care/Home Eval, PT Therapeutic Exercises, PT TENS Application, PT Neuro Re-Ed/Balance, PT Therapeutic Activity and PT Manual Therapy     Note:     Goal Note filed on 12/30/19 1653 by Montse Cabrera DPT    Low BP, medical hold, dec activity tolerance                    Goal: STG-Within one week, patient will ambulate household distance     Dates: Start: 12/30/19       Description: 1) Individualized goal:  30 ft with FWW CGA-SBA indoors   2) Interventions:  PT Group Therapy, PT E Stim Attended, PT Gait Training, PT Therapeutic Exercises, PT Neuro Re-Ed/Balance, PT Aquatic Therapy, PT Therapeutic Activity and PT Manual Therapy                   Problem: Mobility Transfers     Dates: Start: 12/28/19       Description:     Goal: STG-Within one week, patient will transfer bed to chair     Dates: Start: 12/28/19       Description: 1) Individualized goal: With FWW at SPV level  2) Interventions: PT Group Therapy, PT Prosthetic Training, PT Gait Training, PT Self Care/Home Eval, PT Therapeutic Exercises, PT TENS Application, PT Neuro Re-Ed/Balance, PT Therapeutic Activity and PT Manual Therapy       Note:     Goal Note filed on 12/30/19 1620 by Montse Cabrera DPT    CGA SPT, SPV bed mobility  Further assessment/ progression limited by low BP, and dizziness  Medical hold                        Problem: PT-Long Term Goals     Dates: Start: 12/28/19       Description:     Goal: LTG-By discharge, patient will propel wheelchair      Dates: Start: 12/28/19       Description: 1) Individualized goal:  300 feet indoors/outdoors at Mod I level  2) Interventions:  PT Group Therapy, PT Prosthetic Training, PT Gait Training, PT Self Care/Home Eval, PT Therapeutic Exercises, PT TENS Application, PT Neuro Re-Ed/Balance, PT Therapeutic Activity and PT Manual Therapy           Goal: LTG-By discharge, patient will ambulate     Dates: Start: 12/28/19       Description: 1) Individualized goal:  with FWW for 25 feet indoors at SPV level  2) Interventions:  PT Group Therapy, PT Prosthetic Training, PT Gait Training, PT Self Care/Home Eval, PT Therapeutic Exercises, PT TENS Application, PT Neuro Re-Ed/Balance, PT Therapeutic Activity and PT Manual Therapy             Goal: LTG-By discharge, patient will transfer one surface to another     Dates: Start: 12/28/19       Description: 1) Individualized goal:  with FWW at Mod I level for bed transfers  2) Interventions:  PT Group Therapy, PT Prosthetic Training, PT Gait Training, PT Self Care/Home Eval, PT Therapeutic Exercises, PT TENS Application, PT Neuro Re-Ed/Balance, PT Therapeutic Activity and PT Manual Therapy               Goal: LTG-By discharge, patient will transfer in/out of a car     Dates: Start: 12/28/19       Description: 1) Individualized goal:  with FWW at [facility] level for car transfers  2) Interventions:  PT Group Therapy, PT Prosthetic Training, PT Gait Training, PT Self Care/Home Eval, PT Therapeutic Exercises, PT TENS Application, PT Neuro Re-Ed/Balance, PT Therapeutic Activity and PT Manual Therapy                           Section completed by:  Montse Cabrera PT, DPT    Activities of Daily Living  Eating Initial:  5 - Standby Prompting/Supervision or Set-up  Eating Current:  5 - Standby Prompting/Supervision or Set-up   Eating Description:     Grooming Initial:  5 - Standby Prompting/Supervision or Set-up  Grooming Current:  5 - Standby Prompting/Supervision or Set-up   Grooming Description:   (brushing teeth; washing hands)  Bathing Initial:  4 - Minimal Assistance  Bathing Current:  4 - Minimal Assistance   Bathing Description:  Tub bench, Grab bar, Hand held shower(CGA)  Upper Body Dressing Initial:  5 - Standby Prompting/Supervision or Set-up  Upper Body Dressing Current:  5 - Standby Prompting/Supervision or Set-up   Upper Body Dressing Description:  ( tank top and long sleeve shirt)  Lower Body Dressing Initial:  4 - Minimal Assistance  Lower Body Dressing Current:  5 - Standby Prompting/Supervsion or Set-up   Lower Body Dressing Description:  5 - Standby Prompting/Supervsion or Set-up  Toileting Initial:  2 - Max Assistance  Toileting Current:  4 - Minimal Assistance   Toileting Description:  Increased time, Supervision for safety, Grab bar(CGA)  Toilet Transfer Initial:  4 - Minimal Assistance  Toilet Transfer Current:  4 - Minimal Assistance   Toilet Transfer Description:  4 - Minimal Assistance  Tub / Shower Transfer Initial:  4 - Minimal Assistance  Tub / Shower Transfer Current:  4 - Minimal Assistance   Tub / Shower Transfer Description:  Grab bar, Increased time, Set-up of equipment(CGA)  IADL:  N/A  Family Training/Education:  TBD  DME/DC Recommendations:  Shower bench, grab bars at toilet/shower, possibly commode over toilet    Strengths:  Able to follow instructions, Independent PLOF, Making steady progress towards goals, Motivated for self care and independence, Pleasant and cooperative and Willingly participates in therapeutic activities  Barriers:  Decreased endurance, Generalized weakness, Poor balance and Other: orthostatic hypotension     # of short term goals set= 4    # of short term goals met= 0/4     Occupational Therapy Goals     Problem: Bathing     Dates: Start: 12/28/19       Description:     Goal: STG-Within one week, patient will bathe     Dates: Start: 12/28/19   Expected End: 01/04/20       Description: 1) Individualized Goal:  With SBA consistently with no cues for  set-up/safety  2) Interventions:  OT E Stim Attended, OT Group Therapy, OT Self Care/ADL, OT Cognitive Skill Dev, OT Community Reintegration, OT Manual Ther Technique, OT Neuro Re-Ed/Balance, OT Sensory Int Techniques, OT Therapeutic Activity, OT Evaluation and OT Therapeutic Exercise        Note:     Goal Note filed on 12/31/19 1135 by Savana Tobias, MS,OTR/L    Requires SBA - Min A                        Problem: Dressing     Dates: Start: 12/28/19       Description:     Goal: STG-Within one week, patient will dress LB     Dates: Start: 12/28/19   Expected End: 01/04/20       Description: 1) Individualized Goal:  With SBA consistently with no cues for set-up/safety  2) Interventions:  OT E Stim Attended, OT Group Therapy, OT Self Care/ADL, OT Cognitive Skill Dev, OT Community Reintegration, OT Manual Ther Technique, OT Neuro Re-Ed/Balance, OT Sensory Int Techniques, OT Therapeutic Activity, OT Evaluation and OT Therapeutic Exercise        Note:     Goal Note filed on 12/31/19 1135 by Savana Tobias, MS,OTR/L    Requires SBA - Min A                        Problem: Functional Transfers     Dates: Start: 12/28/19       Description:     Goal: STG-Within one week, patient will transfer to toilet     Dates: Start: 12/28/19   Expected End: 01/04/20       Description: 1) Individualized Goal:  With SBA consistently with no cues for set-up/safety  2) Interventions:  OT E Stim Attended, OT Group Therapy, OT Self Care/ADL, OT Cognitive Skill Dev, OT Community Reintegration, OT Manual Ther Technique, OT Neuro Re-Ed/Balance, OT Sensory Int Techniques, OT Therapeutic Activity, OT Evaluation and OT Therapeutic Exercise        Note:     Goal Note filed on 12/31/19 1135 by Savana Tobias MS,OTR/L    Requires SBA - Min A                        Problem: OT Long Term Goals     Dates: Start: 12/28/19       Description:     Goal: LTG-By discharge, patient will complete basic self care tasks     Dates: Start: 12/28/19    Expected End: 01/04/20       Description: 1) Individualized Goal:  With mod I  2) Interventions:  OT E Stim Attended, OT Group Therapy, OT Self Care/ADL, OT Cognitive Skill Dev, OT Community Reintegration, OT Manual Ther Technique, OT Neuro Re-Ed/Balance, OT Sensory Int Techniques, OT Therapeutic Activity, OT Evaluation and OT Therapeutic Exercise                 Problem: Toileting     Dates: Start: 12/28/19       Description:     Goal: STG-Within one week, patient will complete toileting tasks     Dates: Start: 12/28/19   Expected End: 01/04/20       Description: 1) Individualized Goal:  With SBA consistently with no cues for set-up/safety  2) Interventions:  OT E Stim Attended, OT Group Therapy, OT Self Care/ADL, OT Cognitive Skill Dev, OT Community Reintegration, OT Manual Ther Technique, OT Neuro Re-Ed/Balance, OT Sensory Int Techniques, OT Therapeutic Activity, OT Evaluation and OT Therapeutic Exercise        Note:     Goal Note filed on 12/31/19 1135 by Savana Tobias MS,OTR/L    Requires SBA - Min A                              Section completed by:  Savana Tobias MS,OTR/L             REHAB-Pharmacy IDT Team Note by Kaitlynn Zaldivar RPH at 12/30/2019  4:38 PM  Version 1 of 1    Author:  Kaitlynn Zaldivar RPH Service:  -- Author Type:  Pharmacist    Filed:  12/30/2019  4:39 PM Date of Service:  12/30/2019  4:38 PM Status:  Signed    :  Kaitlynn Zaldivar RPH (Pharmacist)         Pharmacy[TK.1]   Pharmacy  Antibiotics/Antifungals/Antivirals:  Medication:      Active Orders (From admission, onward)    None        Route:         n/a  Stop Date:  n/a  Reason:   Antihypertensives/Cardiac:  Medication:    Orders (72h ago, onward)     Start     Ordered    12/31/19 0600  valsartan (DIOVAN) tablet 20 mg  Q DAY      12/30/19 1115    12/30/19 0600  spironolactone (ALDACTONE) tablet 12.5 mg  Q DAY      12/29/19 1104    12/29/19 1800  valsartan (DIOVAN) tablet 20 mg  TWICE DAILY,   Status:  Discontinued       12/29/19 1104    12/28/19 0600  spironolactone (ALDACTONE) tablet 25 mg  Q DAY,   Status:  Discontinued      12/27/19 1644    12/27/19 2100  atorvastatin (LIPITOR) tablet 80 mg  NIGHTLY      12/27/19 1644    12/27/19 1800  valsartan (DIOVAN) tablet 40 mg  TWICE DAILY,   Status:  Discontinued      12/27/19 1644    12/27/19 1730  carvedilol (COREG) tablet 6.25 mg  2 TIMES DAILY WITH MEALS      12/27/19 1644    12/27/19 1644  hydrALAZINE (APRESOLINE) tablet 25 mg  EVERY 8 HOURS PRN      12/27/19 1644              Patient Vitals for the past 24 hrs:   BP Pulse   12/30/19 1431 (!) 86/56 --   12/30/19 1400 (!) 94/62 86   12/30/19 0951 (!) 87/60 --   12/30/19 0931 (!) 92/60 95   12/30/19 0835 104/70 76   12/30/19 0700 108/66 89   12/30/19 0529 102/68 90   12/29/19 2350 (!) 90/58 98   12/29/19 1830 (!) 92/59 (!) 106   12/29/19 1805 110/68 (!) 102     Anticoagulation:  Medication:  Aspirin, Plavix, Heparin  INR:      Other key medications: glipizide  A review of the medication list reveals no issues at this time. Patient is currently on antihypertensive(s). Recommend home blood pressure monitoring/recording if antihypertensive(s) regimen(s) continue. Patient is currently on diabetic medication(s) and/or Insulin(s). Recommend home blood glucose monitoring/recording if these regimen(s) continue.  Section completed by:[TK.2]  Kaitlynn Zaldivar RPH[TK.1]        Attribution Key     TK.1 - Kaitlynn Zaldivar RPH on 12/30/2019  4:39 PM   TK.2 - Kaitlynn Zaldivar RP on 12/30/2019  4:38 PM                  DC Planning  DC destination/dispostion:  Patient lives with his wife in a [facility] level home.  He has 2 entry steps so he may need a ramp.    DC Needs: He has follow up scheduled with cardiology.  He will need follow up with his ortho surgeon.  He will likely need a w/c, stump board and walker.  I will follow for recommendations for his therapy follow up level.    Barriers to discharge:       Strengths: good support.    Section  completed by:  Peggy Sigala R.N.      Physician Summary  MARITZA Bai MD  participated and led team conference discussion.

## 2019-12-31 NOTE — REHAB-OT IDT TEAM NOTE
Occupational Therapy   Activities of Daily Living  Eating Initial:  5 - Standby Prompting/Supervision or Set-up  Eating Current:  5 - Standby Prompting/Supervision or Set-up   Eating Description:     Grooming Initial:  5 - Standby Prompting/Supervision or Set-up  Grooming Current:  5 - Standby Prompting/Supervision or Set-up   Grooming Description:  (brushing teeth; washing hands)  Bathing Initial:  4 - Minimal Assistance  Bathing Current:  4 - Minimal Assistance   Bathing Description:  Tub bench, Grab bar, Hand held shower(CGA)  Upper Body Dressing Initial:  5 - Standby Prompting/Supervision or Set-up  Upper Body Dressing Current:  5 - Standby Prompting/Supervision or Set-up   Upper Body Dressing Description:  ( tank top and long sleeve shirt)  Lower Body Dressing Initial:  4 - Minimal Assistance  Lower Body Dressing Current:  5 - Standby Prompting/Supervsion or Set-up   Lower Body Dressing Description:  5 - Standby Prompting/Supervsion or Set-up  Toileting Initial:  2 - Max Assistance  Toileting Current:  4 - Minimal Assistance   Toileting Description:  Increased time, Supervision for safety, Grab bar(CGA)  Toilet Transfer Initial:  4 - Minimal Assistance  Toilet Transfer Current:  4 - Minimal Assistance   Toilet Transfer Description:  4 - Minimal Assistance  Tub / Shower Transfer Initial:  4 - Minimal Assistance  Tub / Shower Transfer Current:  4 - Minimal Assistance   Tub / Shower Transfer Description:  Grab bar, Increased time, Set-up of equipment(CGA)  IADL:  N/A  Family Training/Education:  TBD  DME/DC Recommendations:  Shower bench, grab bars at toilet/shower, possibly commode over toilet    Strengths:  Able to follow instructions, Independent PLOF, Making steady progress towards goals, Motivated for self care and independence, Pleasant and cooperative and Willingly participates in therapeutic activities  Barriers:  Decreased endurance, Generalized weakness, Poor balance and Other: orthostatic hypotension      # of short term goals set= 4    # of short term goals met= 0/4     Occupational Therapy Goals     Problem: Bathing     Dates: Start: 12/28/19       Description:     Goal: STG-Within one week, patient will bathe     Dates: Start: 12/28/19   Expected End: 01/04/20       Description: 1) Individualized Goal:  With SBA consistently with no cues for set-up/safety  2) Interventions:  OT E Stim Attended, OT Group Therapy, OT Self Care/ADL, OT Cognitive Skill Dev, OT Community Reintegration, OT Manual Ther Technique, OT Neuro Re-Ed/Balance, OT Sensory Int Techniques, OT Therapeutic Activity, OT Evaluation and OT Therapeutic Exercise        Note:     Goal Note filed on 12/31/19 1135 by Savana Tobias, MS,OTR/L    Requires SBA - Min A                        Problem: Dressing     Dates: Start: 12/28/19       Description:     Goal: STG-Within one week, patient will dress LB     Dates: Start: 12/28/19   Expected End: 01/04/20       Description: 1) Individualized Goal:  With SBA consistently with no cues for set-up/safety  2) Interventions:  OT E Stim Attended, OT Group Therapy, OT Self Care/ADL, OT Cognitive Skill Dev, OT Community Reintegration, OT Manual Ther Technique, OT Neuro Re-Ed/Balance, OT Sensory Int Techniques, OT Therapeutic Activity, OT Evaluation and OT Therapeutic Exercise        Note:     Goal Note filed on 12/31/19 1135 by Savana Tobias MS,OTR/L    Requires SBA - Min A                        Problem: Functional Transfers     Dates: Start: 12/28/19       Description:     Goal: STG-Within one week, patient will transfer to toilet     Dates: Start: 12/28/19   Expected End: 01/04/20       Description: 1) Individualized Goal:  With SBA consistently with no cues for set-up/safety  2) Interventions:  OT E Stim Attended, OT Group Therapy, OT Self Care/ADL, OT Cognitive Skill Dev, OT Community Reintegration, OT Manual Ther Technique, OT Neuro Re-Ed/Balance, OT Sensory Int Techniques, OT Therapeutic Activity,  OT Evaluation and OT Therapeutic Exercise        Note:     Goal Note filed on 12/31/19 1135 by Savana Tobias MS,OTR/L    Requires SBA - Min A                        Problem: OT Long Term Goals     Dates: Start: 12/28/19       Description:     Goal: LTG-By discharge, patient will complete basic self care tasks     Dates: Start: 12/28/19   Expected End: 01/04/20       Description: 1) Individualized Goal:  With mod I  2) Interventions:  OT E Stim Attended, OT Group Therapy, OT Self Care/ADL, OT Cognitive Skill Dev, OT Community Reintegration, OT Manual Ther Technique, OT Neuro Re-Ed/Balance, OT Sensory Int Techniques, OT Therapeutic Activity, OT Evaluation and OT Therapeutic Exercise                 Problem: Toileting     Dates: Start: 12/28/19       Description:     Goal: STG-Within one week, patient will complete toileting tasks     Dates: Start: 12/28/19   Expected End: 01/04/20       Description: 1) Individualized Goal:  With SBA consistently with no cues for set-up/safety  2) Interventions:  OT E Stim Attended, OT Group Therapy, OT Self Care/ADL, OT Cognitive Skill Dev, OT Community Reintegration, OT Manual Ther Technique, OT Neuro Re-Ed/Balance, OT Sensory Int Techniques, OT Therapeutic Activity, OT Evaluation and OT Therapeutic Exercise        Note:     Goal Note filed on 12/31/19 1135 by Savana Tobias MS,OTR/L    Requires SBA - Min A                              Section completed by:  Savana Tobias MS,OTR/L

## 2019-12-31 NOTE — CARE PLAN
Problem: Mobility  Goal: STG-Within one week, patient will propel wheelchair community  Description  1) Individualized goal:  2x 150 feet at SPV level indoors or Min A outdoors with ramps  2) Interventions:  PT Group Therapy, PT Prosthetic Training, PT Gait Training, PT Self Care/Home Eval, PT Therapeutic Exercises, PT TENS Application, PT Neuro Re-Ed/Balance, PT Therapeutic Activity and PT Manual Therapy   12/30/2019 1653 by Montse Cabrera DPT  Outcome: NOT MET  Note:   Low BP, medical hold, dec activity tolerance     Problem: Mobility Transfers  Goal: STG-Within one week, patient will transfer bed to chair  Description  1) Individualized goal: With FWW at SPV level  2) Interventions: PT Group Therapy, PT Prosthetic Training, PT Gait Training, PT Self Care/Home Eval, PT Therapeutic Exercises, PT TENS Application, PT Neuro Re-Ed/Balance, PT Therapeutic Activity and PT Manual Therapy     Outcome: NOT MET  Note:   CGA SPT, SPV bed mobility  Further assessment/ progression limited by low BP, and dizziness  Medical hold

## 2019-12-31 NOTE — DISCHARGE PLANNING
CASE MANAGEMENT INITIAL ASSESSMENT    Admit Date:  12/27/2019     Patient is a  54 y.o. male transferred from [facility].  His admitting physician for rehab is Dr. Bai.  He is isolated for resistent organisms.  He has lt bka performed by Dr. Zhao with staple removal to be due at 3 weeks post op per his notes.    Diagnosis: 05.4 unilateral LE below knee amputation (bka)  S/P BKA (below knee amputation) unilateral, left ([facility])    Co-morbidities:   Patient Active Problem List    Diagnosis Date Noted   • Osteomyelitis of left foot ([facility]) 08/21/2019     Priority: High   • PVD (peripheral vascular disease) ([facility]) 06/21/2019     Priority: Low   • Vitamin D deficiency 12/28/2019   • CAD (coronary artery disease) 12/28/2019   • HTN (hypertension) 12/28/2019   • S/P BKA (below knee amputation) unilateral, left ([facility]) 12/27/2019   • Normocytic anemia 08/21/2019   • Diabetic foot ([facility]) 08/21/2019   • Pressure injury of deep tissue of left foot 07/30/2019   • Diabetic ulcer of toe of left foot associated with type 2 diabetes mellitus, with necrosis of muscle ([facility]) 07/30/2019   • Diabetic foot ulcer ([facility]) 07/07/2019   • Diabetic polyneuropathy associated with type 2 diabetes mellitus ([facility]) 07/07/2019   • Wound infection 05/24/2019   • Uncontrolled type 2 diabetes mellitus with hyperglycemia ([facility]) 05/14/2019   • Hyperlipidemia 05/14/2019   • Acute ischemic stroke ([facility]) 06/15/2018     Prior Living Situation:  Housing / Facility: 1 Story House  Lives with - Patient's Self Care Capacity: Spouse    Prior Level of Function:  Shopping: Other (Comments)(Wife completes this chore)  Prior Level Of Mobility: Independent Without Device in Community, Independent Without Device in Home  Driving / Transportation: Driving Independent    Support Systems:  Primary : Wife is Terrance Medina at [phone] or son Cori Aviles at [phone]  Other support systems:      Previous Services Utilized:   Equipment Owned: None  Prior Services:  Home-Independent    Other Information:  Occupation (Pre-Hospital Vocational): Employed Full Time  Primary Payor Source: Private Insurance  Primary Care Practitioner : Dr. Mj Bai  Other MDs: Dr. Zhao for orthopaedics.    Additional Case Management Questions:  Have you ever received case management services for yourself or a family member?  Unable to determine at this time    Do you feel you have and an understanding of what services  provide? Unable to determine at this time    Do you have any additional questions regarding case management?  Unable to determine at this time.       CASE MANAGEMENT PLAN OF CARE   Individualized Goals:   1. I will glean dme recommendations from therapist.  2. I will confirm if patient has appropriate diabetic supplies for home.    Barriers:   1. 2 steps at home.    Patient / Family Goal:  Patient / Family Goal: Patient lives with his wife in a one story home.  He has 2 steps so may need a ramp.  He will likely need a w/c, walker and stump board.  I will follow for therapy recommendations.    Plan:  1. Continue to follow patient through hospitalization and provide discharge planning in collaboration with patient, family, physicians and ancillary services.     2. Utilize community resources to ensure a safe discharge.

## 2019-12-31 NOTE — THERAPY
Occupational Therapy  Daily Treatment     Patient Name: Israel Aviles  Age:  54 y.o., Sex:  male  Medical Record #: [id number]  Today's Date: 12/31/2019     Precautions  Precautions: Non Weight Bearing Left Lower Extremity, Fall Risk, Other (See Comments), Immobilizer Left Lower Extremity  Comments: MDRO Contact precautions; L BKA, Hypotensive    Safety   ADL Safety : Requires Physical Assist for Safety, Requires Supervision for Safety    Subjective       Objective       12/31/19 1231   Precautions   Precautions Non Weight Bearing Left Lower Extremity;Fall Risk;Other (See Comments);Immobilizer Left Lower Extremity   Comments MDRO Contact precautions; L BKA, Hypotensive   Vitals   Blood Pressure 107/72   Respiration 18   Pulse Oximetry 95 %   O2 Delivery None (Room Air)   Vitals Comments Above vital post ther ex  (Pre/post ther ex vitals w/in norms. )   Pain   Intervention Declines   Non Verbal Descriptors   Non Verbal Scale  Calm   Cognition    Level of Consciousness Alert   Sitting Upper Body Exercises   Chest Press 3 sets of 10  (Sitting on EOB, 10# wand, 3x10 w/ rest break between sets)   Front Arm Raise 3 sets of 10  (Sitting on EOB, 10# wand, 3x10 w/ rest break between sets)   Shoulder Press 3 sets of 10  (Sitting on EOB, 10# wand, 3x10 w/ rest break between sets)   Bicep Curls 3 sets of 10  (Sitting on EOB, 10# wand, 3x10 w/ rest break between sets)   Other Exercise Sitting on EOB, 4# medicine ball, ball toss/catch w/ therapist standing 4' from pt.  4x25 w/ rest brek between sets, no LOB, no dropped balls.   OT Total Time Spent   OT Individual Total Time Spent (Mins) 30   OT Charge Group   OT Therapeutic Exercise  2       Assessment    Pt was alert and cooperative w/ tx.  Mod I to transition to/from supine and EOB sitting.  There ex pre/post vitals were w/in norms.  There ex seated at EOB - refer to notes above.  Rest break between all sets secondary fatigue.  No LOB.      Plan    Cont' dynamic standing  balance, strengthening, endurance building, family training, and compensatory strategies to increase overall safety and independence with ADL/IADL pursuits.

## 2019-12-31 NOTE — CARE PLAN
Problem: Mobility  Goal: STG-Within one week, patient will propel wheelchair community  Description  1) Individualized goal:  2x 150 feet at SPV level indoors or Min A outdoors with ramps  2) Interventions:  PT Group Therapy, PT Prosthetic Training, PT Gait Training, PT Self Care/Home Eval, PT Therapeutic Exercises, PT TENS Application, PT Neuro Re-Ed/Balance, PT Therapeutic Activity and PT Manual Therapy   Outcome: NOT MET  Note:   Patient is limited due to hypotension over past multiple days  Goal: STG-Within one week, patient will ambulate household distance  Description  1) Individualized goal:  30 ft with FWW CGA-SBA indoors   2) Interventions:  PT Group Therapy, PT E Stim Attended, PT Gait Training, PT Therapeutic Exercises, PT Neuro Re-Ed/Balance, PT Aquatic Therapy, PT Therapeutic Activity and PT Manual Therapy     Outcome: NOT MET  Note:   Patient is limited due to hypotension over past multiple days     Problem: Mobility Transfers  Goal: STG-Within one week, patient will transfer bed to chair  Description  1) Individualized goal: With FWW at SPV level  2) Interventions: PT Group Therapy, PT Prosthetic Training, PT Gait Training, PT Self Care/Home Eval, PT Therapeutic Exercises, PT TENS Application, PT Neuro Re-Ed/Balance, PT Therapeutic Activity and PT Manual Therapy     Outcome: NOT MET  Note:   Patient is limited due to hypotension over past multiple days

## 2019-12-31 NOTE — DISCHARGE PLANNING
Met with patient and his wife.  Reviewed team conference discussion and current d/c target.  He is meeting with orthotic companies to discuss prosthesis.  Discussed home health, will follow.

## 2019-12-31 NOTE — REHAB-PHARMACY IDT TEAM NOTE
Pharmacy   Pharmacy  Antibiotics/Antifungals/Antivirals:  Medication:      Active Orders (From admission, onward)    None        Route:         n/a  Stop Date:  n/a  Reason:   Antihypertensives/Cardiac:  Medication:    Orders (72h ago, onward)     Start     Ordered    12/31/19 0600  valsartan (DIOVAN) tablet 20 mg  Q DAY      12/30/19 1115    12/30/19 0600  spironolactone (ALDACTONE) tablet 12.5 mg  Q DAY      12/29/19 1104    12/29/19 1800  valsartan (DIOVAN) tablet 20 mg  TWICE DAILY,   Status:  Discontinued      12/29/19 1104    12/28/19 0600  spironolactone (ALDACTONE) tablet 25 mg  Q DAY,   Status:  Discontinued      12/27/19 1644    12/27/19 2100  atorvastatin (LIPITOR) tablet 80 mg  NIGHTLY      12/27/19 1644    12/27/19 1800  valsartan (DIOVAN) tablet 40 mg  TWICE DAILY,   Status:  Discontinued      12/27/19 1644    12/27/19 1730  carvedilol (COREG) tablet 6.25 mg  2 TIMES DAILY WITH MEALS      12/27/19 1644    12/27/19 1644  hydrALAZINE (APRESOLINE) tablet 25 mg  EVERY 8 HOURS PRN      12/27/19 1644              Patient Vitals for the past 24 hrs:   BP Pulse   12/30/19 1431 (!) 86/56 --   12/30/19 1400 (!) 94/62 86   12/30/19 0951 (!) 87/60 --   12/30/19 0931 (!) 92/60 95   12/30/19 0835 104/70 76   12/30/19 0700 108/66 89   12/30/19 0529 102/68 90   12/29/19 2350 (!) 90/58 98   12/29/19 1830 (!) 92/59 (!) 106   12/29/19 1805 110/68 (!) 102     Anticoagulation:  Medication:  Aspirin, Plavix, Heparin  INR:      Other key medications: glipizide  A review of the medication list reveals no issues at this time. Patient is currently on antihypertensive(s). Recommend home blood pressure monitoring/recording if antihypertensive(s) regimen(s) continue. Patient is currently on diabetic medication(s) and/or Insulin(s). Recommend home blood glucose monitoring/recording if these regimen(s) continue.  Section completed by:  Kaitlynn Zaldivar RPH

## 2019-12-31 NOTE — PROGRESS NOTES
Rehab Progress Note     Encounter Date: 12/30/2019    CC: CARLY BKA, weakness, DM    Interval Events (Subjective)  Patient sitting up in room. He reports feeling light headed from low SBP. He reports that the new blood pressure medications are new to him. Discussed with wife and she endorses this as well. Discussed with hospitalist and decreased HTN medication and consider midodrine if no improvement. Denies NVD when laying down. Denies SOB.     Objective:  VITAL SIGNS: BP (!) 86/56 Comment: RN notified  Pulse 86   Temp 36.4 °C (97.6 °F) (Oral)   Resp 18   Ht 1.829 m (6')   Wt 68.5 kg (151 lb 0.2 oz)   SpO2 96%   BMI 20.48 kg/m²   Gen: NAD  Psych: Mood and affect appropriate  CV: RRR, no edema  Resp: CTAB, no upper airway sounds  Abd: NTND  Neuro: AOx4, 5/5 BUE    Recent Results (from the past 72 hour(s))   ACCU-CHEK GLUCOSE    Collection Time: 12/27/19  6:22 PM   Result Value Ref Range    Glucose - Accu-Ck 134 (H) 65 - 99 mg/dL   ACCU-CHEK GLUCOSE    Collection Time: 12/27/19  8:27 PM   Result Value Ref Range    Glucose - Accu-Ck 162 (H) 65 - 99 mg/dL   CBC with Differential    Collection Time: 12/28/19  5:54 AM   Result Value Ref Range    WBC 8.4 4.8 - 10.8 K/uL    RBC 3.74 (L) 4.70 - 6.10 M/uL    Hemoglobin 10.0 (L) 14.0 - 18.0 g/dL    Hematocrit 32.8 (L) 42.0 - 52.0 %    MCV 87.7 81.4 - 97.8 fL    MCH 26.7 (L) 27.0 - 33.0 pg    MCHC 30.5 (L) 33.7 - 35.3 g/dL    RDW 54.5 (H) 35.9 - 50.0 fL    Platelet Count 437 164 - 446 K/uL    MPV 9.1 9.0 - 12.9 fL    Neutrophils-Polys 46.50 44.00 - 72.00 %    Lymphocytes 42.70 (H) 22.00 - 41.00 %    Monocytes 8.00 0.00 - 13.40 %    Eosinophils 0.60 0.00 - 6.90 %    Basophils 0.50 0.00 - 1.80 %    Immature Granulocytes 1.70 (H) 0.00 - 0.90 %    Nucleated RBC 0.00 /100 WBC    Neutrophils (Absolute) 3.90 1.82 - 7.42 K/uL    Lymphs (Absolute) 3.58 1.00 - 4.80 K/uL    Monos (Absolute) 0.67 0.00 - 0.85 K/uL    Eos (Absolute) 0.05 0.00 - 0.51 K/uL    Baso (Absolute) 0.04 0.00 -  0.12 K/uL    Immature Granulocytes (abs) 0.14 (H) 0.00 - 0.11 K/uL    NRBC (Absolute) 0.00 K/uL   Comp Metabolic Panel (CMP)    Collection Time: 12/28/19  5:54 AM   Result Value Ref Range    Sodium 132 (L) 135 - 145 mmol/L    Potassium 5.0 3.6 - 5.5 mmol/L    Chloride 99 96 - 112 mmol/L    Co2 27 20 - 33 mmol/L    Anion Gap 6.0 0.0 - 11.9    Glucose 138 (H) 65 - 99 mg/dL    Bun 19 8 - 22 mg/dL    Creatinine 0.77 0.50 - 1.40 mg/dL    Calcium 8.9 8.5 - 10.5 mg/dL    AST(SGOT) 29 12 - 45 U/L    ALT(SGPT) 54 (H) 2 - 50 U/L    Alkaline Phosphatase 96 30 - 99 U/L    Total Bilirubin 0.5 0.1 - 1.5 mg/dL    Albumin 3.1 (L) 3.2 - 4.9 g/dL    Total Protein 8.5 (H) 6.0 - 8.2 g/dL    Globulin 5.4 (H) 1.9 - 3.5 g/dL    A-G Ratio 0.6 g/dL   Vitamin D, 25-hydroxy (blood)    Collection Time: 12/28/19  5:54 AM   Result Value Ref Range    25-Hydroxy   Vitamin D 25 20 (L) 30 - 100 ng/mL   TSH with Reflex to FT4    Collection Time: 12/28/19  5:54 AM   Result Value Ref Range    TSH 3.030 0.380 - 5.330 uIU/mL   ESTIMATED GFR    Collection Time: 12/28/19  5:54 AM   Result Value Ref Range    GFR If African American >60 >60 mL/min/1.73 m 2    GFR If Non African American >60 >60 mL/min/1.73 m 2   ACCU-CHEK GLUCOSE    Collection Time: 12/28/19  8:05 AM   Result Value Ref Range    Glucose - Accu-Ck 112 (H) 65 - 99 mg/dL   ACCU-CHEK GLUCOSE    Collection Time: 12/28/19 11:24 AM   Result Value Ref Range    Glucose - Accu-Ck 159 (H) 65 - 99 mg/dL   ACCU-CHEK GLUCOSE    Collection Time: 12/28/19  5:27 PM   Result Value Ref Range    Glucose - Accu-Ck 137 (H) 65 - 99 mg/dL   ACCU-CHEK GLUCOSE    Collection Time: 12/28/19  9:13 PM   Result Value Ref Range    Glucose - Accu-Ck 149 (H) 65 - 99 mg/dL   ACCU-CHEK GLUCOSE    Collection Time: 12/29/19  8:00 AM   Result Value Ref Range    Glucose - Accu-Ck 80 65 - 99 mg/dL   ACCU-CHEK GLUCOSE    Collection Time: 12/29/19 11:39 AM   Result Value Ref Range    Glucose - Accu-Ck 85 65 - 99 mg/dL   ACCU-CHEK  GLUCOSE    Collection Time: 12/29/19  1:32 PM   Result Value Ref Range    Glucose - Accu-Ck 51 (L) 65 - 99 mg/dL   ACCU-CHEK GLUCOSE    Collection Time: 12/29/19  2:05 PM   Result Value Ref Range    Glucose - Accu-Ck 69 65 - 99 mg/dL   ACCU-CHEK GLUCOSE    Collection Time: 12/29/19  3:43 PM   Result Value Ref Range    Glucose - Accu-Ck 102 (H) 65 - 99 mg/dL   ACCU-CHEK GLUCOSE    Collection Time: 12/29/19  6:03 PM   Result Value Ref Range    Glucose - Accu-Ck 129 (H) 65 - 99 mg/dL   ACCU-CHEK GLUCOSE    Collection Time: 12/29/19  8:55 PM   Result Value Ref Range    Glucose - Accu-Ck 200 (H) 65 - 99 mg/dL   ACCU-CHEK GLUCOSE    Collection Time: 12/29/19 11:40 PM   Result Value Ref Range    Glucose - Accu-Ck 269 (H) 65 - 99 mg/dL   CBC WITHOUT DIFFERENTIAL    Collection Time: 12/30/19  6:49 AM   Result Value Ref Range    WBC 5.7 4.8 - 10.8 K/uL    RBC 3.75 (L) 4.70 - 6.10 M/uL    Hemoglobin 10.4 (L) 14.0 - 18.0 g/dL    Hematocrit 33.3 (L) 42.0 - 52.0 %    MCV 88.8 81.4 - 97.8 fL    MCH 27.7 27.0 - 33.0 pg    MCHC 31.2 (L) 33.7 - 35.3 g/dL    RDW 58.1 (H) 35.9 - 50.0 fL    Platelet Count 394 164 - 446 K/uL    MPV 9.0 9.0 - 12.9 fL   Basic Metabolic Panel    Collection Time: 12/30/19  6:49 AM   Result Value Ref Range    Sodium 132 (L) 135 - 145 mmol/L    Potassium 4.7 3.6 - 5.5 mmol/L    Chloride 98 96 - 112 mmol/L    Co2 26 20 - 33 mmol/L    Glucose 109 (H) 65 - 99 mg/dL    Bun 25 (H) 8 - 22 mg/dL    Creatinine 0.74 0.50 - 1.40 mg/dL    Calcium 9.1 8.5 - 10.5 mg/dL    Anion Gap 8.0 0.0 - 11.9   proBrain Natriuretic Peptide, NT    Collection Time: 12/30/19  6:49 AM   Result Value Ref Range    NT-proBNP 2535 (H) 0 - 125 pg/mL   MAGNESIUM    Collection Time: 12/30/19  6:49 AM   Result Value Ref Range    Magnesium 2.0 1.5 - 2.5 mg/dL   PHOSPHORUS    Collection Time: 12/30/19  6:49 AM   Result Value Ref Range    Phosphorus 3.4 2.5 - 4.5 mg/dL   ESTIMATED GFR    Collection Time: 12/30/19  6:49 AM   Result Value Ref Range     GFR If African American >60 >60 mL/min/1.73 m 2    GFR If Non African American >60 >60 mL/min/1.73 m 2   ACCU-CHEK GLUCOSE    Collection Time: 12/30/19  7:22 AM   Result Value Ref Range    Glucose - Accu-Ck 98 65 - 99 mg/dL   ACCU-CHEK GLUCOSE    Collection Time: 12/30/19 11:25 AM   Result Value Ref Range    Glucose - Accu-Ck 142 (H) 65 - 99 mg/dL   ACCU-CHEK GLUCOSE    Collection Time: 12/30/19  5:04 PM   Result Value Ref Range    Glucose - Accu-Ck 211 (H) 65 - 99 mg/dL       Current Facility-Administered Medications   Medication Frequency   • [START ON 12/31/2019] valsartan (DIOVAN) tablet 20 mg Q DAY   • glipiZIDE (GLUCOTROL) tablet 2.5 mg BID AC   • spironolactone (ALDACTONE) tablet 12.5 mg Q DAY   • glucose 4 g chewable tablet 16 g Q15 MIN PRN    And   • dextrose 50% (D50W) injection 50 mL Q15 MIN PRN   • vitamin D (cholecalciferol) 1000 UNIT tablet 1,000 Units DAILY   • Respiratory Care per Protocol Continuous RT   • Pharmacy Consult Request ...Pain Management Review 1 Each PHARMACY TO DOSE   • hydrALAZINE (APRESOLINE) tablet 25 mg Q8HRS PRN   • acetaminophen (TYLENOL) tablet 650 mg Q4HRS PRN   • senna-docusate (PERICOLACE or SENOKOT S) 8.6-50 MG per tablet 2 Tab BID    And   • polyethylene glycol/lytes (MIRALAX) PACKET 1 Packet QDAY PRN    And   • magnesium hydroxide (MILK OF MAGNESIA) suspension 30 mL QDAY PRN    And   • bisacodyl (DULCOLAX) suppository 10 mg QDAY PRN   • artificial tears ophthalmic solution 1 Drop PRN   • benzocaine-menthol (CEPACOL) lozenge 1 Lozenge Q2HRS PRN   • mag hydrox-al hydrox-simeth (MAALOX PLUS ES or MYLANTA DS) suspension 20 mL Q2HRS PRN   • ondansetron (ZOFRAN ODT) dispertab 4 mg 4X/DAY PRN    Or   • ondansetron (ZOFRAN) syringe/vial injection 4 mg 4X/DAY PRN   • traZODone (DESYREL) tablet 50 mg QHS PRN   • sodium chloride (OCEAN) 0.65 % nasal spray 2 Spray PRN   • aspirin EC (ECOTRIN) tablet 81 mg DAILY   • atorvastatin (LIPITOR) tablet 80 mg Nightly   • carvedilol (COREG)  tablet 6.25 mg BID WITH MEALS   • clopidogrel (PLAVIX) tablet 75 mg QHS   • ferrous sulfate tablet 325 mg QDAY with Breakfast   • heparin injection 5,000 Units Q8HRS   • metFORMIN (GLUCOPHAGE) tablet 1,000 mg BID WITH MEALS   • oxyCODONE-acetaminophen (PERCOCET) 5-325 MG per tablet 1-2 Tab Q4HRS PRN       Orders Placed This Encounter   Procedures   • Diet Order Diabetic     Standing Status:   Standing     Number of Occurrences:   1     Order Specific Question:   Diet:     Answer:   Diabetic [3]     Order Specific Question:   Miscellaneous modifications:     Answer:   Vegetarian [13]       Assessment:  Active Hospital Problems    Diagnosis   • *S/P BKA (below knee amputation) unilateral, left ([facility])   • PVD (peripheral vascular disease) ([facility])   • Vitamin D deficiency   • CAD (coronary artery disease)   • HTN (hypertension)   • Normocytic anemia   • Diabetic polyneuropathy associated with type 2 diabetes mellitus ([facility])   • Hyperlipidemia   • Uncontrolled type 2 diabetes mellitus with hyperglycemia ([facility])       Medical Decision Making and Plan:  L BKA - Patient with 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 Zhao on 12/20/19.  -PT and OT for mobility and ADLs  -NWB LLE. Consider IPOP, will need to choose orthotics company.     Neuropathic pain - Patient would prefer no medications. PT working on desensitization and mirror therapy.     DM - Patient on Glipizide 5 mg qAM, Metformin 1000 mg BID and fingersticks   -Consult Hospitalist. Started on Lantus     HTN/CV/PAD - Patient on ASA 81 mg and Plavix 75 QPM. Patient with recent TTE with EF of 40%. Patient on Coreg 6.25 mg, Spironolactone 25 mg daily, and Valsartan 40 mg BID  -Ongoing Hypotension, discussed with hospitalist. Decrease Spironolactone 12.5 mg daily, and decrease Valsartan 20 mg daily. Continue to monitor.      HLD - Patient on Atorvastatin 80 mg QHS     Anemia - Patient on Fe supplement on transfer. Check AM CBC - 10 on admission.      Vitamin  D - deficient on admission. Start 1000 U     DVT Ppx - Patient on Heparin on transfer. Monitor for ambulation.      Total time:  36 minutes.  I spent greater than 50% of the time for patient care, counseling, and coordination on this date, including unit/floor time, and face-to-face time with the patient as per interval events and assessment and plan above. Topics discussed included orthostatic hypotension, reduce antihypertensives, discussed case with hospitalist and [facility] medical hold.     Oliverio Bai M.D.

## 2019-12-31 NOTE — THERAPY
"Occupational Therapy  Daily Treatment     Patient Name: Israel Aviles  Age:  54 y.o., Sex:  male  Medical Record #: [id number]  Today's Date: 12/31/2019     Precautions  Precautions: Non Weight Bearing Left Lower Extremity, Fall Risk, Other (See Comments), Immobilizer Left Lower Extremity  Comments: MDRO Contact precautions; L BKA, Hypotensive    Safety   ADL Safety : (P) Requires Supervision for Safety, Requires Physical Assist for Safety  Bathroom Safety: (P) Requires Physical Assist for Safety, Requires Supervision for Safety    Subjective    \"I feel awesome\", after completing his shower today.  He expressed that he was so happy to be more awake today and not sleeping as much as yesterday.     Objective       12/31/19 1001   Precautions   Precautions Non Weight Bearing Left Lower Extremity   Comments MDRO contact precautions; L BKA, hypotensive   Safety    ADL Safety  Requires Supervision for Safety;Requires Physical Assist for Safety   Bathroom Safety Requires Physical Assist for Safety;Requires Supervision for Safety   Pain   Intervention Declines   Pain 0 - 10 Group   Location Leg   Location Orientation Distal;Left   Pain Rating Scale (NPRS) 1   Description Sore   Comfort Goal Comfort at Rest   Therapist Pain Assessment Prior to Activity;During Activity   Non Verbal Descriptors   Non Verbal Scale  Calm   Cognition    Level of Consciousness Alert   Active ROM Upper Body   Active ROM Upper Body  WDL   Bed Mobility    Supine to Sit Stand by Assist   Scooting Supervised   Skilled Intervention Verbal Cuing;Compensatory Strategies   Interdisciplinary Plan of Care Collaboration   IDT Collaboration with  Physical Therapist;Nursing   Patient Position at End of Therapy In Bed;Call Light within Reach   Collaboration Comments Asked RN to change dressing on R heel after shower   OT Total Time Spent   OT Individual Total Time Spent (Mins) 60   OT Charge Group   OT Self Care / ADL 4         FIM Bathing Score:  5 - " Standby Prompting/Supervision or Set-up  Bathing Description:       FIM Upper Body Dressin - Independent  Upper Body Dressing Description:       FIM Lower Body Dressing Score:  4 - Minimal Assistance  Lower Body Dressing Description:       FIM Toileting Body Dressin - Minimal Assistance  Toileting Description:       FIM Bed/Chair/Wheelchair Transfers Score: 4 - Minimal Assistance  Bed/Chair/Wheelchair Transfers Description:  Supervision for safety, Verbal cueing      FIM Tub/Shower Transfers Score:  4 - Minimal Assistance  Tub/Shower Transfers Description:  Grab bar, Adaptive equipment, Supervision for safety, Verbal cueing      Assessment    Pt showing big improvements today over 2 previous days, tolerating being out of bed and doing therapy without symptoms of low BP.  Pt completed shower today seated with SBA for safety.  Shower Transfer was close CGA using grab bar and shower seat.  Pt is still weak in the RLE and unsteady standing on 1 leg so requires CGA for safety/balance.  Pt dressed his LB with SBA for underwear and pants, but needed max A to don the knee immobilizer to the L leg.  Began with verbal instructions/education for desensitization techniques and tapping of the residual limb.  Demonstrated some of these techniques to patient using the non affected leg (staples and bandage still on residual limb at this time).  Pt in very good spirits throughout the session and motivated to get well so he can return home.    Plan    Continue with OT POC focusing on functional ADL transfers, bathroom safety, UB strengthening, desensitization techniques/education, and caregiver training.

## 2019-12-31 NOTE — CARE PLAN
Problem: Safety  Goal: Will remain free from injury  Outcome: PROGRESSING AS EXPECTED  Note:   Pt uses call light consistently and appropriately. Waits for assistance does not attempt self transfer this shift. Able to verbalize needs.      Problem: Bowel/Gastric:  Goal: Normal bowel function is maintained or improved  Outcome: PROGRESSING SLOWER THAN EXPECTED  Note:   Pt's last BM 12/28. Pt to take PRN bowel medication in AM. Will continue to monitor.      Problem: Pain Management  Goal: Pain level will decrease to patient's comfort goal  Outcome: PROGRESSING AS EXPECTED  Note:   Patient able to verbalize pain level and verbalize an acceptable level of pain.

## 2019-12-31 NOTE — PROGRESS NOTES
[facility] Medicine Daily Progress Note    Date of Service  12/31/2019    Chief Complaint:  Hypertension  Diabetes  Fluid overload    Interval History:  No significant events or changes since last visit    Review of Systems  Review of Systems   Constitutional: Negative for chills and fever.   Respiratory: Negative for shortness of breath.    Cardiovascular: Negative for chest pain.   Gastrointestinal: Negative for abdominal pain, diarrhea, nausea and vomiting.   Psychiatric/Behavioral: The patient is not nervous/anxious.         Physical Exam  Temp:  [36.4 °C (97.6 °F)] 36.4 °C (97.6 °F)  Pulse:  [] 103  Resp:  [18] 18  BP: ()/(54-63) 87/56  SpO2:  [96 %-99 %] 99 %    Physical Exam  Vitals signs and nursing note reviewed.   Constitutional:       Appearance: Normal appearance.   HENT:      Head: Atraumatic.   Eyes:      Conjunctiva/sclera: Conjunctivae normal.      Pupils: Pupils are equal, round, and reactive to light.   Neck:      Musculoskeletal: Normal range of motion and neck supple.   Cardiovascular:      Rate and Rhythm: Normal rate and regular rhythm.      Heart sounds: No murmur.   Pulmonary:      Effort: Pulmonary effort is normal.      Breath sounds: No wheezing or rales.   Abdominal:      General: There is no distension.      Palpations: Abdomen is soft.      Tenderness: There is no tenderness.   Musculoskeletal:      Right lower leg: No edema.      Left lower leg: No edema.      Comments: Has left BKA   Skin:     General: Skin is warm and dry.      Findings: No rash.   Neurological:      Mental Status: He is alert and oriented to person, place, and time.   Psychiatric:         Mood and Affect: Mood normal.         Behavior: Behavior normal.         Fluids    Intake/Output Summary (Last 24 hours) at 12/31/2019 1125  Last data filed at 12/30/2019 2008  Gross per 24 hour   Intake 240 ml   Output --   Net 240 ml       Laboratory  Recent Labs     12/30/19  0649   WBC 5.7   RBC 3.75*   HEMOGLOBIN  10.4*   HEMATOCRIT 33.3*   MCV 88.8   MCH 27.7   MCHC 31.2*   RDW 58.1*   PLATELETCT 394   MPV 9.0     Recent Labs     19  0649   SODIUM 132*   POTASSIUM 4.7   CHLORIDE 98   CO2 26   GLUCOSE 109*   BUN 25*   CREATININE 0.74   CALCIUM 9.1                   Assessment/Plan  * S/P BKA (below knee amputation) unilateral, left ([facility])  Assessment & Plan  2nd to left foot osteomyelitis  S/P BKA on 19 by Dr. Zhao  S/P IV abx per Infectious Diseases    Azotemia  Assessment & Plan  Bun: 19() --> 25 ()  ? 2nd to Aldactone  Encouraginge fluid (water/juice) intake  Monitor    HTN (hypertension)  Assessment & Plan  BP low with SBP   HR:   On Diovan: 20 mg daily --> will d/c ()  On Core.25 mg bid  On Aldactone: 12.5 mg daily  Cont to monitor    CAD (coronary artery disease)  Assessment & Plan  S/P NSTEMI  Echo: EF 45%  BNP: 6008 () --> 2535 ()  Has 3 vessel disease per cardiac cath on 19 -- recommendations for medical management  On ASA  On Lipitor  On Diovan & Coreg & Aldactone  Note: need out patient Cardiology follow up    Vitamin D deficiency  Assessment & Plan  Vit D: 20  On supplements    Normocytic anemia- (present on admission)  Assessment & Plan  H&H has been improving  On Fe supplements     Uncontrolled type 2 diabetes mellitus with hyperglycemia ([facility])- (present on admission)  Assessment & Plan  Hba1c: 8.5  BS   Off Lantus  On Metformin: 1000 mg bid  On Glipizide: 5 mg bid --> 2.5 mg bid ( evening)  Note: home meds include Metformin 1000 mg bid and Glipizide 5 mg bid  Cont to monitor    PVD (peripheral vascular disease) ([facility])  Assessment & Plan  Has hx of prior LLE revascularization  On ASA & Plavix  On Lipitor

## 2019-12-31 NOTE — PROGRESS NOTES
Rehab Progress Note     Encounter Date: 12/31/2019    CC: LLE BKA, weakness, DM    Interval Events (Subjective)  Patient sitting up in room. He reports she is still struggling with dizziness but it is improving off medications. Discussed case with hospitalist and discontinuing blood pressure medications. Denies SOB. Denies NVD. Discussed would have IDT today to discuss discharge planning.     IDT Team Meeting 12/31/2019    IOliverio M.D., was present and led the interdisciplinary team conference on 12/31/2019.  I led the IDT conference and agree with the IDT conference documentation and plan of care as noted below.     RN:  Diet Regular, diabetic   % Meal     Pain    Sleep    Bowel Continent   Bladder Continent   In's & Out's    Very much limited by SBP    PT:  Bed Mobility    Transfers    Mobility maxA due to hypotension   Stairs    Very limited by hypotension    OT:  Eating    Grooming    Bathing Hari   UB Dressing    LB Dressing    Toileting Hari   Shower & Transfer Hari   No symptoms in shower this morning    CM:  Continues to work on disposition and DME needs.      DC/Disposition:  1/9/20    Objective:  VITAL SIGNS: /72   Pulse (!) 103   Temp 36.4 °C (97.6 °F) (Oral)   Resp 18   Ht 1.829 m (6')   Wt 68.5 kg (151 lb 0.2 oz)   SpO2 95%   BMI 20.48 kg/m²   Gen: NAD  Psych: Mood and affect appropriate  CV: RRR, no edema  Resp: CTAB, no upper airway sounds  Abd: NTND  Neuro: AOx4, 5/5 BUE  Unchanged from 12/30/19    Recent Results (from the past 72 hour(s))   ACCU-CHEK GLUCOSE    Collection Time: 12/28/19  5:27 PM   Result Value Ref Range    Glucose - Accu-Ck 137 (H) 65 - 99 mg/dL   ACCU-CHEK GLUCOSE    Collection Time: 12/28/19  9:13 PM   Result Value Ref Range    Glucose - Accu-Ck 149 (H) 65 - 99 mg/dL   ACCU-CHEK GLUCOSE    Collection Time: 12/29/19  8:00 AM   Result Value Ref Range    Glucose - Accu-Ck 80 65 - 99 mg/dL   ACCU-CHEK GLUCOSE    Collection Time: 12/29/19 11:39 AM    Result Value Ref Range    Glucose - Accu-Ck 85 65 - 99 mg/dL   ACCU-CHEK GLUCOSE    Collection Time: 12/29/19  1:32 PM   Result Value Ref Range    Glucose - Accu-Ck 51 (L) 65 - 99 mg/dL   ACCU-CHEK GLUCOSE    Collection Time: 12/29/19  2:05 PM   Result Value Ref Range    Glucose - Accu-Ck 69 65 - 99 mg/dL   ACCU-CHEK GLUCOSE    Collection Time: 12/29/19  3:43 PM   Result Value Ref Range    Glucose - Accu-Ck 102 (H) 65 - 99 mg/dL   ACCU-CHEK GLUCOSE    Collection Time: 12/29/19  6:03 PM   Result Value Ref Range    Glucose - Accu-Ck 129 (H) 65 - 99 mg/dL   ACCU-CHEK GLUCOSE    Collection Time: 12/29/19  8:55 PM   Result Value Ref Range    Glucose - Accu-Ck 200 (H) 65 - 99 mg/dL   ACCU-CHEK GLUCOSE    Collection Time: 12/29/19 11:40 PM   Result Value Ref Range    Glucose - Accu-Ck 269 (H) 65 - 99 mg/dL   CBC WITHOUT DIFFERENTIAL    Collection Time: 12/30/19  6:49 AM   Result Value Ref Range    WBC 5.7 4.8 - 10.8 K/uL    RBC 3.75 (L) 4.70 - 6.10 M/uL    Hemoglobin 10.4 (L) 14.0 - 18.0 g/dL    Hematocrit 33.3 (L) 42.0 - 52.0 %    MCV 88.8 81.4 - 97.8 fL    MCH 27.7 27.0 - 33.0 pg    MCHC 31.2 (L) 33.7 - 35.3 g/dL    RDW 58.1 (H) 35.9 - 50.0 fL    Platelet Count 394 164 - 446 K/uL    MPV 9.0 9.0 - 12.9 fL   Basic Metabolic Panel    Collection Time: 12/30/19  6:49 AM   Result Value Ref Range    Sodium 132 (L) 135 - 145 mmol/L    Potassium 4.7 3.6 - 5.5 mmol/L    Chloride 98 96 - 112 mmol/L    Co2 26 20 - 33 mmol/L    Glucose 109 (H) 65 - 99 mg/dL    Bun 25 (H) 8 - 22 mg/dL    Creatinine 0.74 0.50 - 1.40 mg/dL    Calcium 9.1 8.5 - 10.5 mg/dL    Anion Gap 8.0 0.0 - 11.9   proBrain Natriuretic Peptide, NT    Collection Time: 12/30/19  6:49 AM   Result Value Ref Range    NT-proBNP 2535 (H) 0 - 125 pg/mL   MAGNESIUM    Collection Time: 12/30/19  6:49 AM   Result Value Ref Range    Magnesium 2.0 1.5 - 2.5 mg/dL   PHOSPHORUS    Collection Time: 12/30/19  6:49 AM   Result Value Ref Range    Phosphorus 3.4 2.5 - 4.5 mg/dL    ESTIMATED GFR    Collection Time: 12/30/19  6:49 AM   Result Value Ref Range    GFR If African American >60 >60 mL/min/1.73 m 2    GFR If Non African American >60 >60 mL/min/1.73 m 2   ACCU-CHEK GLUCOSE    Collection Time: 12/30/19  7:22 AM   Result Value Ref Range    Glucose - Accu-Ck 98 65 - 99 mg/dL   ACCU-CHEK GLUCOSE    Collection Time: 12/30/19 11:25 AM   Result Value Ref Range    Glucose - Accu-Ck 142 (H) 65 - 99 mg/dL   ACCU-CHEK GLUCOSE    Collection Time: 12/30/19  5:04 PM   Result Value Ref Range    Glucose - Accu-Ck 211 (H) 65 - 99 mg/dL   ACCU-CHEK GLUCOSE    Collection Time: 12/30/19  8:06 PM   Result Value Ref Range    Glucose - Accu-Ck 225 (H) 65 - 99 mg/dL   ACCU-CHEK GLUCOSE    Collection Time: 12/31/19  8:01 AM   Result Value Ref Range    Glucose - Accu-Ck 122 (H) 65 - 99 mg/dL   ACCU-CHEK GLUCOSE    Collection Time: 12/31/19 11:40 AM   Result Value Ref Range    Glucose - Accu-Ck 209 (H) 65 - 99 mg/dL       Current Facility-Administered Medications   Medication Frequency   • VALSARTAN 80 MG PO TABS    • glipiZIDE (GLUCOTROL) tablet 2.5 mg BID AC   • spironolactone (ALDACTONE) tablet 12.5 mg Q DAY   • glucose 4 g chewable tablet 16 g Q15 MIN PRN    And   • dextrose 50% (D50W) injection 50 mL Q15 MIN PRN   • vitamin D (cholecalciferol) 1000 UNIT tablet 1,000 Units DAILY   • Respiratory Care per Protocol Continuous RT   • Pharmacy Consult Request ...Pain Management Review 1 Each PHARMACY TO DOSE   • hydrALAZINE (APRESOLINE) tablet 25 mg Q8HRS PRN   • acetaminophen (TYLENOL) tablet 650 mg Q4HRS PRN   • senna-docusate (PERICOLACE or SENOKOT S) 8.6-50 MG per tablet 2 Tab BID    And   • polyethylene glycol/lytes (MIRALAX) PACKET 1 Packet QDAY PRN    And   • magnesium hydroxide (MILK OF MAGNESIA) suspension 30 mL QDAY PRN    And   • bisacodyl (DULCOLAX) suppository 10 mg QDAY PRN   • artificial tears ophthalmic solution 1 Drop PRN   • benzocaine-menthol (CEPACOL) lozenge 1 Lozenge Q2HRS PRN   • mag  hydrox-al hydrox-simeth (MAALOX PLUS ES or MYLANTA DS) suspension 20 mL Q2HRS PRN   • ondansetron (ZOFRAN ODT) dispertab 4 mg 4X/DAY PRN    Or   • ondansetron (ZOFRAN) syringe/vial injection 4 mg 4X/DAY PRN   • traZODone (DESYREL) tablet 50 mg QHS PRN   • sodium chloride (OCEAN) 0.65 % nasal spray 2 Spray PRN   • aspirin EC (ECOTRIN) tablet 81 mg DAILY   • atorvastatin (LIPITOR) tablet 80 mg Nightly   • carvedilol (COREG) tablet 6.25 mg BID WITH MEALS   • clopidogrel (PLAVIX) tablet 75 mg QHS   • ferrous sulfate tablet 325 mg QDAY with Breakfast   • heparin injection 5,000 Units Q8HRS   • metFORMIN (GLUCOPHAGE) tablet 1,000 mg BID WITH MEALS   • oxyCODONE-acetaminophen (PERCOCET) 5-325 MG per tablet 1-2 Tab Q4HRS PRN       Orders Placed This Encounter   Procedures   • Diet Order Diabetic     Standing Status:   Standing     Number of Occurrences:   1     Order Specific Question:   Diet:     Answer:   Diabetic [3]     Order Specific Question:   Miscellaneous modifications:     Answer:   Vegetarian [13]       Assessment:  Active Hospital Problems    Diagnosis   • *S/P BKA (below knee amputation) unilateral, left ([facility])   • PVD (peripheral vascular disease) ([facility])   • Vitamin D deficiency   • CAD (coronary artery disease)   • HTN (hypertension)   • Normocytic anemia   • Diabetic polyneuropathy associated with type 2 diabetes mellitus (HCC)   • Hyperlipidemia   • Uncontrolled type 2 diabetes mellitus with hyperglycemia ([facility])       Medical Decision Making and Plan:  L BKA - Patient with 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 Zhao on 12/20/19.  -PT and OT for mobility and ADLs  -NWB LLE. Consider IPOP, patient has chosen [employer]     Neuropathic pain - Patient would prefer no medications. PT working on desensitization and mirror therapy.     DM - Patient on Glipizide 5 mg qAM, Metformin 1000 mg BID and fingersticks   -Consult Hospitalist. Started on Lantus     HTN/CV/PAD - Patient on ASA 81 mg and Plavix  75 QPM. Patient with recent TTE with EF of 40%. Patient on Coreg 6.25 mg, Spironolactone 25 mg daily, and Valsartan 40 mg BID  -Ongoing Hypotension, discussed with hospitalist. Decrease Spironolactone 12.5 mg daily. Discontinue Valsartan. Coreg being held     HLD - Patient on Atorvastatin 80 mg QHS     Anemia - Patient on Fe supplement on transfer. Check AM CBC - 10 on admission.      Vitamin D - deficient on admission. Start 1000 U     DVT Ppx - Patient on Heparin on transfer. Monitor for ambulation.      Total time:  35 minutes.  I spent greater than 50% of the time for patient care, counseling, and coordination on this date, including unit/floor time, and face-to-face time with the patient as per interval events and assessment and plan above. Topics discussed included discharge planning, ongoing hypotension, and discontinue Valsartan. Patient was discussed separately in IDT today; please see details above.    Oliverio Bai M.D.

## 2019-12-31 NOTE — THERAPY
"Physical Therapy   Daily Treatment     Patient Name: Israel Aviles  Age:  54 y.o., Sex:  male  Medical Record #: [id number]  Today's Date: 12/31/2019     Precautions  Precautions: Non Weight Bearing Left Lower Extremity, Fall Risk, Other (See Comments), Immobilizer Left Lower Extremity  Comments: Abdominal binder for Hypotension; MDRO Contact precautions; L BKA    Subjective    Patient agreeable to makeup PT discussion.  SO present as well.     Objective       12/31/19 1415   Precautions   Precautions Non Weight Bearing Left Lower Extremity;Fall Risk;Other (See Comments);Immobilizer Left Lower Extremity   Comments Abdominal binder for Hypotension; MDRO Contact precautions; L BKA   Vitals   O2 (LPM) 0   O2 Delivery None (Room Air)   Bed Mobility    Supine to Sit Stand by Assist   Sit to Supine Stand by Assist   IDT Conference   IDT Conference I have reviewed and discussed the POC and barriers to discharge for this patient.  I am knowledgeable of the patient's needs and have attended the IDT Conference.   Interdisciplinary Plan of Care Collaboration   IDT Collaboration with  Family / Caregiver  (IDT team)   Collaboration Comments IDT conference prior to this makeup PT session: will don Abdominal binder during therapies and OOB to help manage pt's hypotension at this time; will pursue pt moving from LLE immobilizer to IPOP with their preferred company; d/c on or before 1/9/20 depending on progress managing BP and pt's ELOF; pt will use 16\" wide w/c with lap belt, anti-tippers, cushion, and L stump board; ramp; FWW for transfers; Home health therpies initially.  Spoke with pt and pt's SO for 25 minutes following conclusion of IDT conference; discussed their prosthetic company options as they have list from yesterday; SO reports they met with [employer] yesterday and may have begun IPOP process (unable to confirm as of this note) and they are meeting with [facility] today; discussed customer service and insurance " are important for consideration of prosthetic companies, discussed POC, expectations at d/c, w/c level mobility initially, and progress towards prosthesis.  x25 min with pt and SO.  CM arrived towards end of makeup PT session.   PT Total Time Spent   PT Individual Total Time Spent (Mins) 30   PT Charge Group   PT Therapeutic Activities 2       Assessment    Patient and SO have good cognition and the amount of information will require further repetition and use of videos, etc, to improve long-term carryover.  No hypotension symptoms this session again.  Good participation in discussion by SO and pt.    Plan    Use abdominal binder when OOB.  Follow-up with pt and SO's preferred prosthetic company to secure IPOP (and confirm if [employer] began process?).  Continue to progress OOB tolerance while monitoring BP.  Review and progress HEP.  Outdoor w/c mobility, ramps, and pressure reliefs.  Initiate hands-on family training when appropriate.  D/C Home on or before 1/9/20.

## 2019-12-31 NOTE — REHAB-CM IDT TEAM NOTE
Case Management    DC Planning  DC destination/dispostion:  Patient lives with his wife in a songle level home.  He has 2 entry steps so he may need a ramp.    DC Needs: He has follow up scheduled with cardiology.  He will need follow up with his ortho surgeon.  He will likely need a w/c, stump board and walker.  I will follow for recommendations for his therapy follow up level.    Barriers to discharge:       Strengths: good support.    Section completed by:  Peggy Sigala R.N.

## 2020-01-01 LAB
GLUCOSE BLD-MCNC: 150 MG/DL (ref 65–99)
GLUCOSE BLD-MCNC: 175 MG/DL (ref 65–99)
GLUCOSE BLD-MCNC: 204 MG/DL (ref 65–99)
GLUCOSE BLD-MCNC: 236 MG/DL (ref 65–99)

## 2020-01-01 PROCEDURE — 97110 THERAPEUTIC EXERCISES: CPT

## 2020-01-01 PROCEDURE — 700102 HCHG RX REV CODE 250 W/ 637 OVERRIDE(OP): Performed by: PHYSICAL MEDICINE & REHABILITATION

## 2020-01-01 PROCEDURE — 97530 THERAPEUTIC ACTIVITIES: CPT

## 2020-01-01 PROCEDURE — 700111 HCHG RX REV CODE 636 W/ 250 OVERRIDE (IP): Performed by: PHYSICAL MEDICINE & REHABILITATION

## 2020-01-01 PROCEDURE — 770010 HCHG ROOM/CARE - REHAB SEMI PRIVAT*

## 2020-01-01 PROCEDURE — 99232 SBSQ HOSP IP/OBS MODERATE 35: CPT | Performed by: HOSPITALIST

## 2020-01-01 PROCEDURE — A9270 NON-COVERED ITEM OR SERVICE: HCPCS | Performed by: HOSPITALIST

## 2020-01-01 PROCEDURE — 700102 HCHG RX REV CODE 250 W/ 637 OVERRIDE(OP): Performed by: HOSPITALIST

## 2020-01-01 PROCEDURE — 97535 SELF CARE MNGMENT TRAINING: CPT

## 2020-01-01 PROCEDURE — A9270 NON-COVERED ITEM OR SERVICE: HCPCS | Performed by: PHYSICAL MEDICINE & REHABILITATION

## 2020-01-01 PROCEDURE — 82962 GLUCOSE BLOOD TEST: CPT | Mod: 91

## 2020-01-01 RX ADMIN — HEPARIN SODIUM 5000 UNITS: 5000 INJECTION, SOLUTION INTRAVENOUS; SUBCUTANEOUS at 14:18

## 2020-01-01 RX ADMIN — VITAMIN D, TAB 1000IU (100/BT) 1000 UNITS: 25 TAB at 09:21

## 2020-01-01 RX ADMIN — GLIPIZIDE 2.5 MG: 5 TABLET ORAL at 17:41

## 2020-01-01 RX ADMIN — METFORMIN HYDROCHLORIDE 1000 MG: 500 TABLET ORAL at 17:41

## 2020-01-01 RX ADMIN — ASPIRIN 81 MG: 81 TABLET, COATED ORAL at 09:21

## 2020-01-01 RX ADMIN — HEPARIN SODIUM 5000 UNITS: 5000 INJECTION, SOLUTION INTRAVENOUS; SUBCUTANEOUS at 05:30

## 2020-01-01 RX ADMIN — ATORVASTATIN CALCIUM 80 MG: 40 TABLET, FILM COATED ORAL at 20:39

## 2020-01-01 RX ADMIN — CLOPIDOGREL BISULFATE 75 MG: 75 TABLET ORAL at 20:39

## 2020-01-01 RX ADMIN — SPIRONOLACTONE 12.5 MG: 25 TABLET ORAL at 05:30

## 2020-01-01 RX ADMIN — GLIPIZIDE 2.5 MG: 5 TABLET ORAL at 09:21

## 2020-01-01 RX ADMIN — SENNOSIDES AND DOCUSATE SODIUM 2 TABLET: 8.6; 5 TABLET ORAL at 20:39

## 2020-01-01 RX ADMIN — METFORMIN HYDROCHLORIDE 1000 MG: 500 TABLET ORAL at 09:21

## 2020-01-01 RX ADMIN — FERROUS SULFATE TAB 325 MG (65 MG ELEMENTAL FE) 325 MG: 325 (65 FE) TAB at 09:22

## 2020-01-01 RX ADMIN — HEPARIN SODIUM 5000 UNITS: 5000 INJECTION, SOLUTION INTRAVENOUS; SUBCUTANEOUS at 20:38

## 2020-01-01 ASSESSMENT — ENCOUNTER SYMPTOMS
DIZZINESS: 0
FEVER: 0
NAUSEA: 0
SHORTNESS OF BREATH: 0
HEADACHES: 0
PALPITATIONS: 0
HALLUCINATIONS: 0
VOMITING: 0
BLURRED VISION: 0

## 2020-01-01 ASSESSMENT — PATIENT HEALTH QUESTIONNAIRE - PHQ9
2. FEELING DOWN, DEPRESSED, IRRITABLE, OR HOPELESS: NOT AT ALL
1. LITTLE INTEREST OR PLEASURE IN DOING THINGS: NOT AT ALL
SUM OF ALL RESPONSES TO PHQ9 QUESTIONS 1 AND 2: 0

## 2020-01-01 NOTE — CARE PLAN
Problem: Bowel/Gastric:  Goal: Normal bowel function is maintained or improved  Outcome: PROGRESSING AS EXPECTED  Note:   Patient having regular bowel movements; last BM 12/31/19.  Denies s/s constipation; bowel meds available if needed.       Problem: Pain Management  Goal: Pain level will decrease to patient's comfort goal  Outcome: PROGRESSING AS EXPECTED

## 2020-01-01 NOTE — THERAPY
"Physical Therapy   Daily Treatment     Patient Name: Israel Aviles  Age:  54 y.o., Sex:  male  Medical Record #: [id number]  Today's Date: 1/1/2020     Precautions  Precautions: Non Weight Bearing Left Lower Extremity, Immobilizer Left Lower Extremity, Fall Risk, Other (See Comments)  Comments: Abdominal binder for Hypotension; MDRO Contact precautions; L BKA    Subjective    Patient agreeable to PT.     Objective       12/31/19 0901   Precautions   Precautions Non Weight Bearing Left Lower Extremity   Comments MDRO contact precautions; L BKA, hypotensive   Vitals   O2 (LPM) 0   O2 Delivery None (Room Air)   Vitals Comments Nursing present with vitals prior to meds admin near beginning of session.   Supine Lower Body Exercise   Bridges 2 sets of 10;Two Legged  (bolster)   Straight Leg Raises 2 sets of 10;Front   Knee to Chest 2 sets of 10   Other Exercises Laying prone x10 minutes on mat   Bed Mobility    Supine to Sit Minimal Assist   Sit to Supine Stand by Assist   Sit to Stand Minimal Assist   Scooting Stand by Assist   Rolling   (SBA)   Interdisciplinary Plan of Care Collaboration   IDT Collaboration with  Nursing   Collaboration Comments BP and meds admin   PT Total Time Spent   PT Individual Total Time Spent (Mins) 60   PT Charge Group   PT Therapeutic Exercise 2   PT Therapeutic Activities 2       FIM Bed/Chair/Wheelchair Transfers Score: 4 - Minimal Assistance  Bed/Chair/Wheelchair Transfers Description:   (Min A supine to sit, Min A during reach-pivot w/c<>EOM, performed 1 rep at mat today, setup, cueing, increased time.)    FIM Wheelchair Score:  2 - Max Assistance  Wheelchair Description:   (SPV, increased time, 140 feet and 85 feet reps.  Exchanged pt's w/c to 18\" wide to allow for less friction with Roho cushion, pt will benefit from 16\" wide w/c at d/c though.)    FIM Stairs Score:  0 - Not tested,unsafe activity  Stairs Description:         Assessment    Patient does not demonstrate symptoms of " hypotension; RN aware and spoke with hospitalist also; good participation with today's PT.    Plan    Continue to progress OOB tolerance while monitoring BP.  Initiate family training for residual limb wrapping, and transfers. Review and progress HEP.

## 2020-01-01 NOTE — THERAPY
Physical Therapy   Daily Treatment     Patient Name: Israel Aviles  Age:  54 y.o., Sex:  male  Medical Record #: [id number]  Today's Date: 1/1/2020     Precautions  Precautions: Non Weight Bearing Left Lower Extremity, Immobilizer Left Lower Extremity, Fall Risk, Other (See Comments)  Comments: Abdominal binder for Hypotension; MDRO Contact precautions; L BKA    Subjective    Pt was seated in w/c upon arrival and agreeable to treatment.  Pt's spouse present for initial family training.       Objective     01/01/20 0901   Precautions   Precautions Non Weight Bearing Left Lower Extremity;Immobilizer Left Lower Extremity;Fall Risk;Other (See Comments)   Comments Abdominal binder for Hypotension; MDRO Contact precautions; L BKA   Vitals   Pulse 99   Patient BP Position Sitting   Blood Pressure (!) 83/57   Room Air Oximetry 99   O2 (LPM) 0   O2 Delivery None (Room Air)   Pain 0 - 10 Group   Therapist Pain Assessment Post Activity Pain Same as Prior to Activity;Nurse Notified;0   Bed Mobility    Sit to Supine Stand by Assist   Scooting Supervised   Interdisciplinary Plan of Care Collaboration   IDT Collaboration with  Nursing   Patient Position at End of Therapy In Bed;Call Light within Reach;Tray Table within Reach;Phone within Reach;Family / Friend in Room   Collaboration Comments vitals   PT Total Time Spent   PT Individual Total Time Spent (Mins) 30   PT Charge Group   PT Therapeutic Activities 2       Assisted pt in donning abdominal binder.  Initial family training completed.  Discussion completed with pt and pt's spouse on POC/goals, prosthetic time frame, discussion of prosthetic company options, and need to obtain IPOP from prosthetic company once one is chosen.      Pt assisted back to bed at end of session secondary to low BP.  Transfer completed with CGA using bed rail.     Assessment    Pt and pt's spouse verbalized all education.  Pt continues to be limited secondary to BP issues.  Pt's BP does drop  with prolonged sitting despite use of abdominal binder.      Plan    Gait with FWW, edu in BKA stretching/strengthening/positioning/contacture avoidance, functional transfers, endurnace

## 2020-01-01 NOTE — THERAPY
"Physical Therapy   Daily Treatment     Patient Name: Israel Aviles  Age:  54 y.o., Sex:  male  Medical Record #: [id number]  Today's Date: 1/1/2020     Precautions  Precautions: (P) Non Weight Bearing Left Lower Extremity, Immobilizer Left Lower Extremity, Fall Risk, Other (See Comments)  Comments: (P) Abdominal binder for Hypotension; MDRO Contact precautions; L BKA    Subjective    Pt received in bed. He is showing signs of depression.     Objective       12/31/19 1531   Precautions   Precautions Non Weight Bearing Left Lower Extremity;Immobilizer Left Lower Extremity;Fall Risk;Other (See Comments)   Comments Abdominal binder for Hypotension; MDRO Contact precautions; L BKA   Vitals   Patient BP Position Supine   Blood Pressure 110/70   Cognition    Level of Consciousness Alert   Bed Mobility    Sit to Stand Minimal Assist  (EOB<>FWW)   Interdisciplinary Plan of Care Collaboration   IDT Collaboration with  Family / Caregiver   Patient Position at End of Therapy In Bed;Call Light within Reach;Tray Table within Reach;Phone within Reach;Family / Friend in Room   Collaboration Comments pt's spouse present during session   PT Total Time Spent   PT Individual Total Time Spent (Mins) 30   PT Charge Group   PT Therapeutic Activities 2       STS training from EOB<>FWW x3-4 trials, each 30\"-90\", minimal reports of dizziness in stance however appeared to subside with progressive reps.    Pt edu: wrapping of residual limb. PT re-educated pt on wrapping technique, he requested that PT wrap limb as he didn't feel he could do it properly. PT emphasized avoidance of \"dog ears.\" PT also educated pt on avoidance of knee flexion contractures, and use of immobilizer to both keep the knee straight and to protect the limb.    Assessment    Pt tolerated standing with minimal reports of dizziness.    Plan    Gait with FWW, edu in BKA stretching/strengthening/positioning/contacture avoidance, functional transfers, endurnace    "

## 2020-01-01 NOTE — PROGRESS NOTES
[facility] Medicine Daily Progress Note    Date of Service  1/1/2020    Chief Complaint:  Hypertension  Diabetes  Fluid overload    Interval History:  No significant events or changes since last visit    Review of Systems  Review of Systems   Constitutional: Negative for fever.   Eyes: Negative for blurred vision.   Respiratory: Negative for shortness of breath.    Cardiovascular: Negative for palpitations.   Gastrointestinal: Negative for nausea and vomiting.   Neurological: Negative for dizziness and headaches.   Psychiatric/Behavioral: Negative for hallucinations.        Physical Exam  Temp:  [36.5 °C (97.7 °F)-36.7 °C (98 °F)] 36.7 °C (98 °F)  Pulse:  [95-97] 95  Resp:  [17-18] 17  BP: ()/(57-80) 105/65  SpO2:  [95 %-98 %] 98 %    Physical Exam  Vitals signs and nursing note reviewed.   Constitutional:       General: He is not in acute distress.  HENT:      Mouth/Throat:      Mouth: Mucous membranes are moist.      Pharynx: Oropharynx is clear.   Eyes:      General: No scleral icterus.  Neck:      Musculoskeletal: No neck rigidity.   Cardiovascular:      Rate and Rhythm: Normal rate and regular rhythm.      Heart sounds: No murmur.   Pulmonary:      Effort: Pulmonary effort is normal.      Breath sounds: Normal breath sounds.   Abdominal:      General: There is no distension.      Palpations: Abdomen is soft.      Tenderness: There is no tenderness.   Musculoskeletal:      Right lower leg: No edema.      Left lower leg: No edema.      Comments: Has left BKA   Skin:     General: Skin is warm and dry.      Findings: No rash.   Neurological:      Mental Status: He is alert and oriented to person, place, and time.   Psychiatric:         Mood and Affect: Mood normal.         Behavior: Behavior normal.         Fluids    Intake/Output Summary (Last 24 hours) at 1/1/2020 1009  Last data filed at 1/1/2020 0000  Gross per 24 hour   Intake 640 ml   Output 800 ml   Net -160 ml       Laboratory  Recent Labs      19  0649   WBC 5.7   RBC 3.75*   HEMOGLOBIN 10.4*   HEMATOCRIT 33.3*   MCV 88.8   MCH 27.7   MCHC 31.2*   RDW 58.1*   PLATELETCT 394   MPV 9.0     Recent Labs     19  0649   SODIUM 132*   POTASSIUM 4.7   CHLORIDE 98   CO2 26   GLUCOSE 109*   BUN 25*   CREATININE 0.74   CALCIUM 9.1                   Assessment/Plan  * S/P BKA (below knee amputation) unilateral, left (HCC)  Assessment & Plan  2nd to left foot osteomyelitis  S/P BKA on 19 by Dr. Zhao  S/P IV abx per Infectious Diseases    Azotemia  Assessment & Plan  Bun: 19 () --> 25 ()  ? 2nd to Aldactone  Encouraginge fluid (water/juice) intake  Monitor    HTN (hypertension)  Assessment & Plan  BP a little better recently but still occ dips lower  HR: ok  Off Diovan (last dose  am)  On Core.25 mg bid -- has been held for a couple days 2nd to lower BP --> will d/c ()  On Aldactone: 12.5 mg daily  Cont to monitor    CAD (coronary artery disease)  Assessment & Plan  S/P NSTEMI  Echo: EF 45%  BNP: 6008 () --> 2535 ()  Has 3 vessel disease per cardiac cath on 19 -- recommendations for medical management  On ASA  On Lipitor  Off Diovan  On Coreg -- will d/c 2nd to low BP ()  On Aldactone  Note: need out patient Cardiology follow up    Vitamin D deficiency  Assessment & Plan  Vit D: 20  On supplements    Normocytic anemia- (present on admission)  Assessment & Plan  H&H has been improving  On Fe supplements     Uncontrolled type 2 diabetes mellitus with hyperglycemia (HCC)- (present on admission)  Assessment & Plan  Hba1c: 8.5  -226  Off Lantus  On Metformin: 1000 mg bid  On Glipizide: 5 mg bid --> 2.5 mg bid ( evening)  Consider increasing am Glipizide dose  Note: home meds include Metformin 1000 mg bid and Glipizide 5 mg bid  Will monitor another day before adjusting meds    PVD (peripheral vascular disease) ([facility])  Assessment & Plan  Has hx of prior LLE revascularization  On ASA & Plavix  On  Lipitor

## 2020-01-01 NOTE — CARE PLAN
Problem: Communication  Goal: The ability to communicate needs accurately and effectively will improve  Outcome: PROGRESSING AS EXPECTED  Note:   Patient able to verbalize needs.  Will continue to monitor.      Problem: Safety  Goal: Will remain free from injury  Outcome: PROGRESSING AS EXPECTED  Note:   Pt uses call light consistently and appropriately. Waits for assistance does not attempt self transfer this shift. Able to verbalize needs.      Problem: Bowel/Gastric:  Goal: Normal bowel function is maintained or improved  Outcome: PROGRESSING AS EXPECTED  Note:   Patient having regular bowel movements; last BM 12/31.  Denies s/s constipation; bowel meds available if needed.  Will continue to monitor.

## 2020-01-01 NOTE — WOUND TEAM
"[facility]  Inpatient Services  Initial Wound and Skin Care Evaluation    Admission Date: 12/27/2019     Consult Date:  12/27/19   HPI, PMH, SH: Reviewed    Unit where seen by Wound Team: [id number]     WOUND CONSULT RELATED TO:  Evaluation of right posterior pressure injury     SUBJECTIVE:  Per wife \"He got this when he was lying in bed\"     Self Report / Pain Level:  Denies; neuropathy       OBJECTIVE:  Slough filled wound with loose edges pre debridement    WOUND TYPE, LOCATION, CHARACTERISTICS (Pressure Injuries: location, stage, POA or date identified)       Pressure Injury 12/31/19 Heel unstageable PI posterior right heel POA (Active)   Wound Image   12/31/2019  3:30 PM   Pressure Injury Stage U 12/31/2019  3:30 PM   Site Assessment Intact;Morse;Yellow 12/31/2019   Angelica-wound Assessment Clean;Intact 12/31/2019   Margins Attached edges 12/31/2019   Wound Length (cm) 0.8 cm 12/31/2019  3:30 PM   Wound Width (cm) 1.4 cm 12/31/2019  3:30 PM   Wound Surface Area (cm^2) 1.12 cm^2 12/31/2019  3:30 PM   Tunneling 0 cm 12/31/2019  3:30 PM   Undermining 0 cm 12/31/2019  3:30 PM   Closure Secondary intention 12/31/2019   Drainage Amount Scant 12/31/2019   Drainage Description Tan 12/31/2019   Treatments Cleansed;Site care 12/31/2019  3:30 PM   Cleansing Normal Saline Irrigation 12/31/2019   Periwound Protectant Skin Protectant wipes to Periwound 12/31/2019   Dressing Options Mepilex;Honey Colloid 12/31/2019   Dressing Cleansing/Solutions Not Applicable 12/31/2019   Dressing Changed New 12/31/2019  3:30 PM   Dressing Status Intact 12/31/2019   Dressing Change Frequency Every 48 hrs 12/31/2019   NEXT Dressing Change  01/02/20 12/31/2019  3:30 PM   NEXT Weekly Photo (Inpatient Only) 01/07/20 12/31/2019  3:30 PM   WOUND NURSE ONLY - Odor None 12/31/2019  3:30 PM   WOUND NURSE ONLY - Exposed Structures None 12/31/2019  3:30 PM   WOUND NURSE ONLY - Tissue Type and Percentage tan 100% 12/31/2019  3:30 PM   WOUND " NURSE ONLY - Time Spent with Patient (mins) 60 12/31/2019  3:30 PM     Vascular:  NA    Dorsal Pedal pulses:    Posterior tib pulses:       ALYSSA:          Lab Values:    Lab Results   Component Value Date/Time    WBC 5.7 12/30/2019 06:49 AM    RBC 3.75 (L) 12/30/2019 06:49 AM    HEMOGLOBIN 10.4 (L) 12/30/2019 06:49 AM    HEMATOCRIT 33.3 (L) 12/30/2019 06:49 AM        Lab Results   Component Value Date/Time    HBA1C 8.5 (H) 12/17/2019 12:33 AM           Culture:  NA      INTERVENTIONS BY WOUND TEAM:  Met with patient and wife.  Using scissors and scalpel sharply debrided wound edges to adherent edges.  Tried to CSWD wound bed but mushy with adherent slough.  Cleansed with NS and measurements and photo taken.  Filled wound bed with honey colloid after applying skin prep to irma wound skin.  Secured with heel/elbow mepilex. Heel float boot in place and patient instructed importance of off loading area which he understands.  Discussed with staff RN and Dr. Bai.  Per photo BKA site L leg is dry and intact and well approximated.    Dressing Selection:  Honey colloid, mepilex         Interdisciplinary consultation: Patient, Bedside RN, Dr. Bai    EVALUATION: unstageable pressure injury POA with high risk of osteomyelitis of heel due to minimal tissue over this area and wound bed character questionable.  Dr. Bai to order imaging and patient has FU with ortho surgeon Monday; honey colloid to autolytically debride slough to viable tissue    Factors affecting wound healing: DM, pressure   Goals: Steady decrease in wound area and depth weekly.    NURSING PLAN OF CARE ORDERS (X):    Dressing changes: See Dressing Care orders: X  Skin care: See Skin Care orders:   Rectal tube care: See Rectal Tube Care orders:   Other orders:    RSKIN: CURRENT (X) ORDERED (O):   Q shift Nakul:  X  Q shift pressure point assessments:  X  Pressure redistribution mattress    X        SULEMA          Bariatric SULEMA         Bariatric foam            Heel float boots    X  Heel silicone dressing    X  Float Heels off Bed with Pillows               Barrier wipes         Barrier Cream         Barrier paste          Sacral silicone dressing     Silicone O2 tubing         Anchorfast         Cannula fixation Device (Tender )          Gray Foam Ear protectors           Trach with Optifoam split foam                 Waffle cushion        Waffle Overlay         Rectal tube or BMS   Purwick/Condom Cath         Antifungal tx      Interdry          Reposition q 2 hours   Independent with bed mobility     Up to chair        Ambulate      PT/OT        Dietician        Diabetes Education      PO   X  TF     TPN     NPO   # days   Other        WOUND TEAM PLAN OF CARE (X):   NPWT change 3 x week:        Dressing changes by wound team:       Follow up as needed:   X Friday    Other (explain):     Anticipated discharge plans (X):   SNF:           Home Care:           Outpatient Wound Center:            Self Care:            Other:   TBD with ongoing wound care

## 2020-01-01 NOTE — THERAPY
"Occupational Therapy  Daily Treatment     Patient Name: Israel Aviles  Age:  54 y.o., Sex:  male  Medical Record #: [id number]  Today's Date: 1/1/2020     Precautions  Precautions: Non Weight Bearing Left Lower Extremity, Immobilizer Left Lower Extremity, Fall Risk, Other (See Comments)  Comments: Abdominal binder for Hypotension; MDRO Contact precautions; L BKA    Safety   ADL Safety : Requires Supervision for Safety  Bathroom Safety: Requires Supervision for Safety    Subjective    Pt upright in bed upon arrival, pleasant and cooperative, agreeable to therapy. S.O. also present.      Objective    Discussed general overview of LLE residual limb care, desensitization strategies, skin check to the other LE - pt verbalized understanding     Discussed general overview of bathroom DME (earlier session with OT - he practiced shower transfers) - recommendation of shower chair and grab bars. Provided pt and S.O. with w/c and FWW (that he is currently using in room) measurements: w/c ~26\" and FWW ~24\" wide - emphasis on checking the measurements of the bathroom. S.O. is confident that the w/c will fit through the house, but she is unsure of the bathroom and she stated that she will check on this. Also discussed fall prevention strategies throughout the house - pt and S.O. verbalized understanding       01/01/20 1001   Interdisciplinary Plan of Care Collaboration   Patient Position at End of Therapy Seated;Call Light within Reach;Tray Table within Reach;Family / Friend in Room   OT Total Time Spent   OT Individual Total Time Spent (Mins) 30   OT Charge Group   OT Self Care / ADL 2       Assessment    Pt and spouse verbalized understanding on edu re: LLE residual limb care, bathroom DME, home safety, fall prevention. They currently have no further questions/concerns.     Plan    Cont overall strength/endurance and standing balance to improve ADL's and functional mobility    "

## 2020-01-01 NOTE — THERAPY
"Occupational Therapy  Daily Treatment     Patient Name: Israel Aviles  Age:  54 y.o., Sex:  male  Medical Record #: [id number]  Today's Date: 1/1/2020     Precautions  Precautions: Non Weight Bearing Left Lower Extremity, Immobilizer Left Lower Extremity, Fall Risk, Other (See Comments)  Comments: Abdominal binder for Hypotension; MDRO Contact precautions; L BKA    Safety   ADL Safety : Requires Supervision for Safety  Bathroom Safety: Requires Supervision for Safety    Subjective    Pt was supine in bed and agreeable to therapy. Wife was present and asleep on the other bed. \" Can we do it later, because my wife is sleeping.\" Re: family training      Objective       01/01/20 0831   Precautions   Precautions Non Weight Bearing Left Lower Extremity;Immobilizer Left Lower Extremity;Fall Risk;Other (See Comments)   Comments Abdominal binder for Hypotension; MDRO Contact precautions; L BKA   Safety    ADL Safety  Requires Supervision for Safety   Bathroom Safety Requires Supervision for Safety   Vitals   Patient BP Position Sitting   Blood Pressure 105/65   Pain 0 - 10 Group   Pain Rating Scale (NPRS) 0   Cognition    Level of Consciousness Alert   Passive ROM Upper Body   Passive ROM Upper Body WDL   Active ROM Upper Body   Active ROM Upper Body  WDL   IADL Treatments   Home Management Pt participated in shower transfer in ADL suite from w/c<>SC simulating home environment. Pt was educated on DME including SC and grab bars and installation recommendations   Balance   Sitting Balance (Static) Good   Sitting Balance (Dynamic) Good   Standing Balance (Static) Good   Standing Balance (Dynamic) Fair -   Weight Shift Sitting Good   Skilled Intervention Verbal Cuing   Bed Mobility    Supine to Sit Stand by Assist   Scooting Supervised   Rolling Supervised   Skilled Intervention Verbal Cuing   Interdisciplinary Plan of Care Collaboration   IDT Collaboration with  Nursing   Patient Position at End of Therapy Seated;Self " Releasing Lap Belt Applied  (PT present )   Collaboration Comments abdominal binder   OT Total Time Spent   OT Individual Total Time Spent (Mins) 30   OT Charge Group   OT Therapy Activity 2       FIM Toiletin - Standby Prompting/Supervision or Set-up  Toileting Description:  Grab bar, Supervision for safety, Verbal cueing    FIM Toilet Transfer Score:  5 - Standby Prompting/Supervision or Set-up  Toilet Transfer Description:  Grab bar, Supervision for safety, Increased time, Verbal cueing    Pt propelled over 80 ft with SBA for direction assistance  Therapist was unable to locate abdominal binder and pt reported he did not have one. Therapist consulted with RN for abdominal binder.     Assessment    Pt participated in shower transfer in ADL suite with SBA. Pt demonstrated understanding of DME recommendations and use of DME. Pt did not report any lightheadedness or symptoms of hypotension during activity.     Plan    Continue OT to address ADL/IADL independence, endurance, BUE strength, education on AE/DME, functional transfers, progressing to completing ADLs at walker level

## 2020-01-01 NOTE — THERAPY
Occupational Therapy  Daily Treatment     Patient Name: Israel Aviles  Age:  54 y.o., Sex:  male  Medical Record #: [id number]  Today's Date: 1/1/2020     Precautions  Precautions: (P) Non Weight Bearing Left Lower Extremity, Immobilizer Left Lower Extremity, Fall Risk, Other (See Comments)  Comments: (P) Abdominal binder for Hypotension; MDRO Contact precautions; L BKA    Safety   ADL Safety : Requires Supervision for Safety  Bathroom Safety: Requires Supervision for Safety    Subjective    Pt was seated in chair completed a meal his wife provided. Wife was present during the session. Wife communicated dietary preferences that were communicated to the RN.      Objective       01/01/20 1401   Precautions   Precautions Non Weight Bearing Left Lower Extremity;Immobilizer Left Lower Extremity;Fall Risk;Other (See Comments)   Comments Abdominal binder for Hypotension; MDRO Contact precautions; L BKA   Vitals   Patient BP Position Sitting   Blood Pressure 102/69  (after thigh pillow squeeze)   Supine Upper Body Exercises   Elbow Extension 2 sets of 10   Sitting Upper Body Exercises   Front Arm Raise 1 set of 10;Right ;Left;Medium Resistance Theraband   Bicep Curls Right ;Left;1 set of 10;Medium Resistance Theraband   Other Exercise Internal/External rotation R/L X 10 with medium resistance banc   IADL Treatments   Home Management Pt's wife educated on bathroom DME in ADL suite. Pt's wife took photos of DME and was given community resource information for obtaining  DME   Interdisciplinary Plan of Care Collaboration   IDT Collaboration with  Family / Caregiver   Patient Position at End of Therapy In Bed;Family / Friend in Room   Collaboration Comments family training on AE/DME, transfers    OT Total Time Spent   OT Individual Total Time Spent (Mins) 60   OT Charge Group   OT Therapeutic Exercise  4       FIM Bed/Chair/Wheelchair Transfers Score: 4 - Minimal Assistance  Bed/Chair/Wheelchair Transfers Description:   (CGA for balance )    SPT with CGA with wife. Pt and wife were educated on advantages of using a FWW for additional stability during SPT    Assessment  Pt completed seated therapeutic exercise as above without complaint of pain. Pt remained seated for 45 minutes with abdominal binder donned after 10 minutes as BP was 94/65. After abdominal binder was donned and pt completed 15 pillow squeezes, BP raised to 102/69. Pt reported feeling fatigued and lightheaded after 25 minutes of exercise. Pt barriers include NWB of LLE, orthostatic hypotension, limited endurance, and limited activity tolerance. Pt's wife demonstrated understanding of recommended DME.     Plan    Continue OT to address ADL/IADL independence, endurance, functional transfers, and BUE strength. Family training for ADLs

## 2020-01-02 LAB
GLUCOSE BLD-MCNC: 170 MG/DL (ref 65–99)
GLUCOSE BLD-MCNC: 171 MG/DL (ref 65–99)
GLUCOSE BLD-MCNC: 200 MG/DL (ref 65–99)
GLUCOSE BLD-MCNC: 280 MG/DL (ref 65–99)

## 2020-01-02 PROCEDURE — 97116 GAIT TRAINING THERAPY: CPT

## 2020-01-02 PROCEDURE — 82962 GLUCOSE BLOOD TEST: CPT

## 2020-01-02 PROCEDURE — 99232 SBSQ HOSP IP/OBS MODERATE 35: CPT | Performed by: HOSPITALIST

## 2020-01-02 PROCEDURE — 700102 HCHG RX REV CODE 250 W/ 637 OVERRIDE(OP): Performed by: HOSPITALIST

## 2020-01-02 PROCEDURE — 97110 THERAPEUTIC EXERCISES: CPT

## 2020-01-02 PROCEDURE — 97530 THERAPEUTIC ACTIVITIES: CPT

## 2020-01-02 PROCEDURE — A9270 NON-COVERED ITEM OR SERVICE: HCPCS | Performed by: HOSPITALIST

## 2020-01-02 PROCEDURE — 770010 HCHG ROOM/CARE - REHAB SEMI PRIVAT*

## 2020-01-02 PROCEDURE — 700102 HCHG RX REV CODE 250 W/ 637 OVERRIDE(OP): Performed by: PHYSICAL MEDICINE & REHABILITATION

## 2020-01-02 PROCEDURE — 700111 HCHG RX REV CODE 636 W/ 250 OVERRIDE (IP): Performed by: PHYSICAL MEDICINE & REHABILITATION

## 2020-01-02 PROCEDURE — A9270 NON-COVERED ITEM OR SERVICE: HCPCS | Performed by: PHYSICAL MEDICINE & REHABILITATION

## 2020-01-02 PROCEDURE — 97535 SELF CARE MNGMENT TRAINING: CPT

## 2020-01-02 PROCEDURE — 99232 SBSQ HOSP IP/OBS MODERATE 35: CPT | Performed by: PHYSICAL MEDICINE & REHABILITATION

## 2020-01-02 RX ORDER — M-VIT,TX,IRON,MINS/CALC/FOLIC 27MG-0.4MG
1 TABLET ORAL DAILY
Status: DISCONTINUED | OUTPATIENT
Start: 2020-01-02 | End: 2020-01-10 | Stop reason: HOSPADM

## 2020-01-02 RX ORDER — ASCORBIC ACID 500 MG
500 TABLET ORAL 2 TIMES DAILY
Status: DISCONTINUED | OUTPATIENT
Start: 2020-01-02 | End: 2020-01-10 | Stop reason: HOSPADM

## 2020-01-02 RX ORDER — GLIPIZIDE 5 MG/1
2.5 TABLET ORAL ONCE
Status: COMPLETED | OUTPATIENT
Start: 2020-01-02 | End: 2020-01-02

## 2020-01-02 RX ORDER — GLIPIZIDE 5 MG/1
5 TABLET ORAL
Status: DISCONTINUED | OUTPATIENT
Start: 2020-01-03 | End: 2020-01-03

## 2020-01-02 RX ORDER — GLIPIZIDE 5 MG/1
2.5 TABLET ORAL EVERY EVENING
Status: DISCONTINUED | OUTPATIENT
Start: 2020-01-02 | End: 2020-01-03

## 2020-01-02 RX ADMIN — CLOPIDOGREL BISULFATE 75 MG: 75 TABLET ORAL at 21:28

## 2020-01-02 RX ADMIN — HEPARIN SODIUM 5000 UNITS: 5000 INJECTION, SOLUTION INTRAVENOUS; SUBCUTANEOUS at 14:43

## 2020-01-02 RX ADMIN — METFORMIN HYDROCHLORIDE 1000 MG: 500 TABLET ORAL at 08:33

## 2020-01-02 RX ADMIN — HEPARIN SODIUM 5000 UNITS: 5000 INJECTION, SOLUTION INTRAVENOUS; SUBCUTANEOUS at 04:58

## 2020-01-02 RX ADMIN — METFORMIN HYDROCHLORIDE 1000 MG: 500 TABLET ORAL at 17:09

## 2020-01-02 RX ADMIN — VITAMIN D, TAB 1000IU (100/BT) 1000 UNITS: 25 TAB at 08:33

## 2020-01-02 RX ADMIN — FERROUS SULFATE TAB 325 MG (65 MG ELEMENTAL FE) 325 MG: 325 (65 FE) TAB at 08:33

## 2020-01-02 RX ADMIN — MULTIPLE VITAMINS W/ MINERALS TAB 1 TABLET: TAB at 17:09

## 2020-01-02 RX ADMIN — HEPARIN SODIUM 5000 UNITS: 5000 INJECTION, SOLUTION INTRAVENOUS; SUBCUTANEOUS at 21:29

## 2020-01-02 RX ADMIN — GLIPIZIDE 2.5 MG: 5 TABLET ORAL at 17:09

## 2020-01-02 RX ADMIN — ASPIRIN 81 MG: 81 TABLET, COATED ORAL at 08:33

## 2020-01-02 RX ADMIN — OXYCODONE HYDROCHLORIDE AND ACETAMINOPHEN 500 MG: 500 TABLET ORAL at 21:28

## 2020-01-02 RX ADMIN — SPIRONOLACTONE 12.5 MG: 25 TABLET ORAL at 04:58

## 2020-01-02 RX ADMIN — ATORVASTATIN CALCIUM 80 MG: 40 TABLET, FILM COATED ORAL at 21:28

## 2020-01-02 RX ADMIN — GLIPIZIDE 2.5 MG: 5 TABLET ORAL at 11:23

## 2020-01-02 RX ADMIN — GLIPIZIDE 2.5 MG: 5 TABLET ORAL at 08:33

## 2020-01-02 ASSESSMENT — ENCOUNTER SYMPTOMS
NERVOUS/ANXIOUS: 0
DIZZINESS: 0
COUGH: 0
BLURRED VISION: 0
DIARRHEA: 0
FEVER: 0

## 2020-01-02 NOTE — THERAPY
Physical Therapy   Daily Treatment     Patient Name: Israel Aviles  Age:  54 y.o., Sex:  male  Medical Record #: [id number]  Today's Date: 1/2/2020     Precautions  Precautions: Non Weight Bearing Left Lower Extremity, Immobilizer Left Lower Extremity, Fall Risk, Other (See Comments)  Comments: Abdominal binder for Hypotension; MDRO Contact precautions; L BKA    Subjective    Pt reports he still is feeling dizzy with standing and walking     Objective       01/02/20 0931   Vitals   Pulse (!) 114   Patient BP Position Standing 3 minutes   Blood Pressure (!) 96/68   Vitals Comments 100/60 insitting with HR of 104   Interdisciplinary Plan of Care Collaboration   Patient Position at End of Therapy Seated;In Bed;Call Light within Reach;Tray Table within Reach   PT Total Time Spent   PT Individual Total Time Spent (Mins) 30   PT Charge Group   PT Gait Training 1   PT Therapeutic Activities 1       FIM Walking Score:  1 - Total Assistance  Walking Description:  Walker, Extra time, Safety concerns, Verbal cueing, Requires incidental assist, Requires wheelchair follow(3 x 70 ft, wc follow dt dizziness, limited by faituge in RLE and BUEs, )      Assessment    Pt cont to be limited by fatigue and dizziness with standing and ambulation but only needs CGA for safety with FWW    Plan    Gait with FWW, edu in BKA stretching/strengthening/positioning/contacture avoidance, functional transfers, endurnace

## 2020-01-02 NOTE — CARE PLAN
Problem: Bowel/Gastric:  Goal: Normal bowel function is maintained or improved  Outcome: PROGRESSING AS EXPECTED     Problem: Pain Management  Goal: Pain level will decrease to patient's comfort goal  Outcome: PROGRESSING AS EXPECTED     Problem: Discharge Barriers/Planning  Goal: Patient's continuum of care needs will be met  Outcome: PROGRESSING SLOWER THAN EXPECTED  Note:   Discussed pt's diet and food choices with pt and wife. Pt has a poorly healing wound on Right heel. Discussed pt's diet and limitations with Registered dietician.

## 2020-01-02 NOTE — PROGRESS NOTES
Rehab Progress Note     Encounter Date: 1/2/2020    CC: CARLY BKA, weakness, DM    Interval Events (Subjective)  Patient sitting up in therapy gym. He reports he has been doing much better at therapy now that his SBP is not so low. He is very appreciative of stopping the BP medications. He reports he has chosen prosthetics company and in agreement for IPOP.  Completed paperwork for IPOP.  Denies pain at this time.     IDT Team Meeting 12/31/2019  DC/Disposition:  1/9/20    Objective:  VITAL SIGNS: /80   Pulse (!) 104   Temp 36.9 °C (98.5 °F) (Oral)   Resp 16   Ht 1.829 m (6')   Wt 68.5 kg (151 lb 0.2 oz)   SpO2 96%   BMI 20.48 kg/m²   Gen: NAD  Psych: Mood and affect appropriate  CV: RRR, no edema  Resp: CTAB, no upper airway sounds  Abd: NTND  Neuro: AOx4, following simple commands    Recent Results (from the past 72 hour(s))   ACCU-CHEK GLUCOSE    Collection Time: 12/30/19  5:04 PM   Result Value Ref Range    Glucose - Accu-Ck 211 (H) 65 - 99 mg/dL   ACCU-CHEK GLUCOSE    Collection Time: 12/30/19  8:06 PM   Result Value Ref Range    Glucose - Accu-Ck 225 (H) 65 - 99 mg/dL   ACCU-CHEK GLUCOSE    Collection Time: 12/31/19  8:01 AM   Result Value Ref Range    Glucose - Accu-Ck 122 (H) 65 - 99 mg/dL   ACCU-CHEK GLUCOSE    Collection Time: 12/31/19 11:40 AM   Result Value Ref Range    Glucose - Accu-Ck 209 (H) 65 - 99 mg/dL   ACCU-CHEK GLUCOSE    Collection Time: 12/31/19  5:30 PM   Result Value Ref Range    Glucose - Accu-Ck 174 (H) 65 - 99 mg/dL   ACCU-CHEK GLUCOSE    Collection Time: 12/31/19  8:08 PM   Result Value Ref Range    Glucose - Accu-Ck 226 (H) 65 - 99 mg/dL   ACCU-CHEK GLUCOSE    Collection Time: 01/01/20  8:07 AM   Result Value Ref Range    Glucose - Accu-Ck 150 (H) 65 - 99 mg/dL   ACCU-CHEK GLUCOSE    Collection Time: 01/01/20 11:20 AM   Result Value Ref Range    Glucose - Accu-Ck 175 (H) 65 - 99 mg/dL   ACCU-CHEK GLUCOSE    Collection Time: 01/01/20  5:40 PM   Result Value Ref Range     Glucose - Accu-Ck 204 (H) 65 - 99 mg/dL   ACCU-CHEK GLUCOSE    Collection Time: 01/01/20  8:42 PM   Result Value Ref Range    Glucose - Accu-Ck 236 (H) 65 - 99 mg/dL   ACCU-CHEK GLUCOSE    Collection Time: 01/02/20  7:19 AM   Result Value Ref Range    Glucose - Accu-Ck 171 (H) 65 - 99 mg/dL   ACCU-CHEK GLUCOSE    Collection Time: 01/02/20 11:09 AM   Result Value Ref Range    Glucose - Accu-Ck 170 (H) 65 - 99 mg/dL       Current Facility-Administered Medications   Medication Frequency   • [START ON 1/3/2020] glipiZIDE (GLUCOTROL) tablet 5 mg QAM AC   • glipiZIDE (GLUCOTROL) tablet 2.5 mg Q EVENING   • therapeutic multivitamin-minerals (THERAGRAN-M) tablet 1 Tab DAILY   • ascorbic acid tablet 500 mg BID   • spironolactone (ALDACTONE) tablet 12.5 mg Q DAY   • glucose 4 g chewable tablet 16 g Q15 MIN PRN    And   • dextrose 50% (D50W) injection 50 mL Q15 MIN PRN   • vitamin D (cholecalciferol) 1000 UNIT tablet 1,000 Units DAILY   • Respiratory Care per Protocol Continuous RT   • Pharmacy Consult Request ...Pain Management Review 1 Each PHARMACY TO DOSE   • hydrALAZINE (APRESOLINE) tablet 25 mg Q8HRS PRN   • acetaminophen (TYLENOL) tablet 650 mg Q4HRS PRN   • senna-docusate (PERICOLACE or SENOKOT S) 8.6-50 MG per tablet 2 Tab BID    And   • polyethylene glycol/lytes (MIRALAX) PACKET 1 Packet QDAY PRN    And   • magnesium hydroxide (MILK OF MAGNESIA) suspension 30 mL QDAY PRN    And   • bisacodyl (DULCOLAX) suppository 10 mg QDAY PRN   • artificial tears ophthalmic solution 1 Drop PRN   • benzocaine-menthol (CEPACOL) lozenge 1 Lozenge Q2HRS PRN   • mag hydrox-al hydrox-simeth (MAALOX PLUS ES or MYLANTA DS) suspension 20 mL Q2HRS PRN   • ondansetron (ZOFRAN ODT) dispertab 4 mg 4X/DAY PRN    Or   • ondansetron (ZOFRAN) syringe/vial injection 4 mg 4X/DAY PRN   • traZODone (DESYREL) tablet 50 mg QHS PRN   • sodium chloride (OCEAN) 0.65 % nasal spray 2 Spray PRN   • aspirin EC (ECOTRIN) tablet 81 mg DAILY   • atorvastatin  (LIPITOR) tablet 80 mg Nightly   • clopidogrel (PLAVIX) tablet 75 mg QHS   • ferrous sulfate tablet 325 mg QDAY with Breakfast   • heparin injection 5,000 Units Q8HRS   • metFORMIN (GLUCOPHAGE) tablet 1,000 mg BID WITH MEALS   • oxyCODONE-acetaminophen (PERCOCET) 5-325 MG per tablet 1-2 Tab Q4HRS PRN       Orders Placed This Encounter   Procedures   • Diet Order Diabetic     Standing Status:   Standing     Number of Occurrences:   1     Order Specific Question:   Diet:     Answer:   Diabetic [3]     Order Specific Question:   Miscellaneous modifications:     Answer:   Vegetarian [13]       Assessment:  Active Hospital Problems    Diagnosis   • *S/P BKA (below knee amputation) unilateral, left ([facility])   • PVD (peripheral vascular disease) ([facility])   • Vitamin D deficiency   • CAD (coronary artery disease)   • HTN (hypertension)   • Normocytic anemia   • Diabetic polyneuropathy associated with type 2 diabetes mellitus ([facility])   • Hyperlipidemia   • Uncontrolled type 2 diabetes mellitus with hyperglycemia ([facility])       Medical Decision Making and Plan:  L BKA - Patient with 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 Zhao on 12/20/19.  -PT and OT for mobility and ADLs  -NWB LLE. IPOP prescription filled out     Neuropathic pain - Patient would prefer no medications. PT working on desensitization and mirror therapy.     DM - Patient on Glipizide 5 mg qAM, Metformin 1000 mg BID and fingersticks   -Consult Hospitalist. Started on Lantus     HTN/CV/PAD - Patient on ASA 81 mg and Plavix 75 QPM. Patient with recent TTE with EF of 40%. Patient on Coreg 6.25 mg, Spironolactone 25 mg daily, and Valsartan 40 mg BID  -Ongoing Hypotension, discussed with hospitalist. Decrease Spironolactone 12.5 mg daily. Discontinue Valsartan. Coreg discontinued, now with tachycardia      HLD - Patient on Atorvastatin 80 mg QHS     Anemia - Patient on Fe supplement on transfer. Check AM CBC - 10 on admission.      Vitamin D - deficient on  admission. Start 1000 U     DVT Ppx - Patient on Heparin on transfer. Monitor for ambulation.      Total time:  26 minutes.  I spent greater than 50% of the time for patient care, counseling, and coordination on this date, including unit/floor time, and face-to-face time with the patient as per interval events and assessment and plan above. Topics discussed included improved SBP, increase HR, continue to monitor, and Rx for IPOP placed. Per dietitian start MVI and Vitamin C for wound healing    Oliverio Bai M.D.

## 2020-01-02 NOTE — THERAPY
"Occupational Therapy  Daily Treatment     Patient Name: Israel Aviles  Age:  54 y.o., Sex:  male  Medical Record #: [id number]  Today's Date: 1/2/2020     Precautions  Precautions: (P) Non Weight Bearing Left Lower Extremity, Immobilizer Left Lower Extremity, Fall Risk, Other (See Comments)  Comments: (P) Abdominal binder for Hypotension; MDRO Contact precautions; L BKA    Safety   ADL Safety : (P) Requires Supervision for Safety  Bathroom Safety: (P) Requires Supervision for Safety  Comments: (P) variable BP requiring supervision     Subjective    Pt was supine in bed and agreeable to therapy. Pt's wife was present for training for ADLs and transfers. \"I would like to shave.\"      Objective       01/02/20 0831   Precautions   Precautions Non Weight Bearing Left Lower Extremity;Immobilizer Left Lower Extremity;Fall Risk;Other (See Comments)   Comments Abdominal binder for Hypotension; MDRO Contact precautions; L BKA   Safety    ADL Safety  Requires Supervision for Safety   Bathroom Safety Requires Supervision for Safety   Comments variable BP requiring supervision    Vitals   Patient BP Position Sitting   Blood Pressure (!) 98/64   O2 Delivery None (Room Air)   Vitals Comments 91/65 sitting, thigh squeeze 100/65 asympomatic during ADLs   Pain 0 - 10 Group   Pain Rating Scale (NPRS) 0   Cognition    Level of Consciousness Alert   Balance   Sitting Balance (Static) Good   Sitting Balance (Dynamic) Good   Standing Balance (Static) Fair +   Standing Balance (Dynamic) Fair -   Weight Shift Sitting Good   Bed Mobility    Supine to Sit Supervised   Sit to Supine Supervised   Skilled Intervention Verbal Cuing;Sequencing  (verbal cues for position of w/c for SqPT)   Interdisciplinary Plan of Care Collaboration   IDT Collaboration with  Family / Caregiver   Patient Position at End of Therapy In Bed;Family / Friend in Room;Call Light within Reach   Collaboration Comments Family training for ADLs and trasnfers   OT Total " Time Spent   OT Individual Total Time Spent (Mins) 60   OT Charge Group   OT Self Care / ADL 4       FIM Eating Score:  7 - Independent  Eating Description:       FIM Grooming Score:  4 - Minimal Assistance  Grooming Description:  Set-up of equipment, Initial preparation for task(Min A to shave back of his head )    FIM Bathing Score:  5 - Standby Prompting/Supervision or Set-up  Bathing Description:       FIM Upper Body Dressin - Independent  Upper Body Dressing Description:       FIM Lower Body Dressing Score:  4 - Minimal Assistance  Lower Body Dressing Description:  Initial preparation for task, Supervision for safety, Verbal cueing, Set-up of equipment(CGA when standing with grab bar)    FIM Toileting Body Dressin - Modified Independent  Toileting Description:  Grab bar, Increased time    FIM Bed/Chair/Wheelchair Transfers Score: 5 - Standby Prompting/Supervision or Set-up  Bed/Chair/Wheelchair Transfers Description:  Increased time, Supervision for safety, Verbal cueing, Set-up of equipment, Initial preparation for task(Verbal cues for w/c set up)    FIM Toilet Transfer Score:  5 - Standby Prompting/Supervision or Set-up  Toilet Transfer Description:  Grab bar, Supervision for safety, Verbal cueing(Verbal cues for w/c position)    FIM Tub/Shower Transfers Score:  5 - Standby Prompting/Supervision or Set-up  Tub/Shower Transfers Description:  Grab bar, Supervision for safety, Verbal cueing      Assessment    Pt participated in toileting, showering, grooming,and dressing with increased independence. Pt's wife required verbal cues to allow pt to attempt ADL tasks independently, however, reports that she will be available to assist pt with ADLs at home. Pt demonstrated stable BP during ADL tasks without abdominal binder from 91//65. Pt BP increased after 15 thigh squeezes with a rolled towel for resistance. Pt was prompted to maintain towel between thighs during ADLs and repeat squeeze  intermittently during the task. Pt and wife demonstrated understanding of waterproof wrapping of LLE prior to bathing and transfer set up for shower. Education was provided for w/c positioning for transfers as the pt attempts transfers with the w/c a distance from the target. Reinforcement of transfers with wife recommended.     Plan    Continue OT to address ADL/IADL independence, endurance, functional transfers, and BUE strength. Family training for ADLs

## 2020-01-02 NOTE — CARE PLAN
Problem: Communication  Goal: The ability to communicate needs accurately and effectively will improve  Outcome: PROGRESSING AS EXPECTED  Note:   Patient able to verbalize needs.  Will continue to monitor.      Problem: Safety  Goal: Will remain free from injury  Outcome: PROGRESSING AS EXPECTED  Note:   Pt uses call light consistently and appropriately. Waits for assistance does not attempt self transfer this shift. Able to verbalize needs.      Problem: Bowel/Gastric:  Goal: Normal bowel function is maintained or improved  Outcome: PROGRESSING AS EXPECTED  Note:   Patient having regular bowel movements; last BM 1/1.  Denies s/s constipation; bowel meds available if needed.  Will continue to monitor.

## 2020-01-02 NOTE — PROGRESS NOTES
[facility] Medicine Daily Progress Note    Date of Service  1/2/2020    Chief Complaint:  Hypertension  Diabetes  Fluid overload    Interval History:  No significant events or changes since last visit    Review of Systems  Review of Systems   Constitutional: Negative for fever.   Eyes: Negative for blurred vision.   Respiratory: Negative for cough.    Cardiovascular: Negative for chest pain.   Gastrointestinal: Negative for diarrhea.   Musculoskeletal: Negative for joint pain.   Neurological: Negative for dizziness.   Psychiatric/Behavioral: The patient is not nervous/anxious.         Physical Exam  Temp:  [36.7 °C (98 °F)-36.8 °C (98.2 °F)] 36.7 °C (98 °F)  Pulse:  [] 104  Resp:  [16-18] 16  BP: ()/(57-76) 100/60  SpO2:  [94 %-97 %] 97 %    Physical Exam  Vitals signs and nursing note reviewed.   Constitutional:       Appearance: He is not diaphoretic.   HENT:      Mouth/Throat:      Pharynx: No oropharyngeal exudate or posterior oropharyngeal erythema.   Eyes:      Extraocular Movements: Extraocular movements intact.   Neck:      Vascular: No carotid bruit.   Cardiovascular:      Rate and Rhythm: Normal rate and regular rhythm.      Heart sounds: No murmur.   Pulmonary:      Effort: Pulmonary effort is normal.      Breath sounds: Normal breath sounds. No stridor.   Abdominal:      General: Bowel sounds are normal.      Palpations: Abdomen is soft.   Musculoskeletal:      Right lower leg: No edema.      Left lower leg: No edema.      Comments: Has left BKA   Skin:     General: Skin is warm and dry.      Findings: No rash.   Neurological:      Mental Status: He is alert and oriented to person, place, and time.   Psychiatric:         Mood and Affect: Mood normal.         Behavior: Behavior normal.         Fluids    Intake/Output Summary (Last 24 hours) at 1/2/2020 1004  Last data filed at 1/2/2020 0800  Gross per 24 hour   Intake 1030 ml   Output --   Net 1030 ml       Laboratory                         Assessment/Plan  * S/P BKA (below knee amputation) unilateral, left (HCC)  Assessment & Plan  2nd to left foot osteomyelitis  S/P BKA on 12/20/19 by Dr. Zhao  S/P IV abx per Infectious Diseases    Azotemia  Assessment & Plan  Bun: 19 (12/28) --> 25 (12/30)  ? 2nd to Aldactone  Encouraginge fluid (water/juice) intake  Monitor    HTN (hypertension)  Assessment & Plan  BP a little better recently but still occ dips lower  HR: ok  Off Diovan (last dose 12/31 am)  Off Coreg (1/1)  On Aldactone: 12.5 mg daily  Cont to monitor    CAD (coronary artery disease)  Assessment & Plan  S/P NSTEMI  Echo: EF 45%  BNP: 6008 (12/18) --> 2535 (12/30)  Has 3 vessel disease per cardiac cath on 12/16/19 -- recommendations for medical management  On ASA  On Lipitor  Off Diovan  Off Coreg -- 2nd to low BP (1/1)  On Aldactone  Note: need out patient Cardiology follow up    Vitamin D deficiency  Assessment & Plan  Vit D: 20  On supplements    Normocytic anemia- (present on admission)  Assessment & Plan  H&H has been improving  On Fe supplements     Uncontrolled type 2 diabetes mellitus with hyperglycemia (HCC)- (present on admission)  Assessment & Plan  Hba1c: 8.5  BS trending higher recently: 150-236  Off Lantus  On Metformin: 1000 mg bid  On Glipizide: 5 mg bid --> 2.5 mg bid (12/29 evening) --> will increase to 5 mg qam & 2.5 mg qpm (1/2)  Note: home meds include Metformin 1000 mg bid and Glipizide 5 mg bid  Cont to monitor    PVD (peripheral vascular disease) (HCC)  Assessment & Plan  Has hx of prior LLE revascularization  On ASA & Plavix  On Lipitor

## 2020-01-02 NOTE — DIETARY
Nutrition services: Day 5 of admit.  Israel Aviles is a 54 y.o. male with admitting DX of 05.4 unilateral LE below knee amputation (bka)  S/P BKA (below knee amputation) unilateral, left    Consult received for wound    Visit with pt at bedside. Pt agreeable to high protein snacks, double protein portions, Boost GC when blood glucose improves. Wife also bringing food from home. Pt reports normal appetite, weight stable.    Assessment:  Height: 182.9 cm (6')  Weight: 68.5 kg (151 lb 0.2 oz)  Body mass index is 20.48 kg/m². Adjusted BMI for BKA: 21.6, BMI classification: Normal  Diet/Intake: Diabetic, generally %    Evaluation:   1. Unstageable pressure injury - heel  2. Labs: Glu: 109; POC Glu 150-226  3. Meds: Glipizide, Metformin, Vit D    Malnutrition Risk: Increased protein needs for wound healing.     Recommendations/Plan:  1. High protein snacks BID, double protein portions  2. Vitamin therapy for wound healing: Multivitamin with minerals QD, Vitamin C 500 mg BID   3. Encourage intake of meals  4. Document intake of all meals as % taken in ADL's to provide interdisciplinary communication across all shifts.   5. Monitor weight.  6. Nutrition rep will continue to see patient for ongoing meal and snack preferences.     RD following weekly

## 2020-01-02 NOTE — REHAB-DIETARY IDT TEAM NOTE
Dietary   Nutrition  Dietary Problems (Active)      There are no active problems.            Nutrition services: Day 5 of admit.  Israel Aviles is a 54 y.o. male with admitting DX of 05.4 unilateral LE below knee amputation (bka)  S/P BKA (below knee amputation) unilateral, left     Consult received for wound     Visit with pt at bedside. Pt agreeable to high protein snacks, double protein portions, Boost GC when blood glucose improves. Wife also bringing food from home. Pt reports normal appetite, weight stable.     Assessment:  Height: 182.9 cm (6')  Weight: 68.5 kg (151 lb 0.2 oz)  Body mass index is 20.48 kg/m². Adjusted BMI for BKA: 21.6, BMI classification: Normal  Diet/Intake: Diabetic, generally %     Evaluation:   1. Unstageable pressure injury - heel  2. Labs: Glu: 109; POC Glu 150-226  3. Meds: Glipizide, Metformin, Vit D     Malnutrition Risk: Increased protein needs for wound healing.      Recommendations/Plan:  1. High protein snacks BID, double protein portions  2. Vitamin therapy for wound healing: Multivitamin with minerals QD, Vitamin C 500 mg BID   3. Encourage intake of meals  4. Document intake of all meals as % taken in ADL's to provide interdisciplinary communication across all shifts.   5. Monitor weight.  6. Nutrition rep will continue to see patient for ongoing meal and snack preferences.      RD following weekly    Section completed by:  Collette Neal R.D.      Secondary Intention Text (Leave Blank If You Do Not Want): The defect will heal with secondary intention.

## 2020-01-02 NOTE — THERAPY
Physical Therapy   Daily Treatment     Patient Name: Isreal Aviles  Age:  54 y.o., Sex:  male  Medical Record #: [id number]  Today's Date: 1/1/2020     Precautions  Precautions: Non Weight Bearing Left Lower Extremity, Immobilizer Left Lower Extremity, Fall Risk, Other (See Comments)  Comments: Abdominal binder for Hypotension; MDRO Contact precautions; L BKA    Subjective    Pt was supine in bed upon arrival and agreeable to treatment.  Pt's spouse present at end of session.      Objective       01/01/20 1301   Precautions   Precautions Non Weight Bearing Left Lower Extremity;Immobilizer Left Lower Extremity;Fall Risk;Other (See Comments)   Comments Abdominal binder for Hypotension; MDRO Contact precautions; L BKA   Vitals   Patient BP Position Sitting   Blood Pressure 106/57   Sitting Lower Body Exercises   Hip Abduction 2 sets of 15;Bilateral   Hip Adduction 2 sets of 15;Bilateral   Long Arc Quad 2 sets of 15;Bilateral   Marching 1 set of 15;Reciprocal   Hamstring Curl 2 sets of 15;Bilateral   Bed Mobility    Supine to Sit Stand by Assist   Scooting Supervised   Rolling Supervised   Interdisciplinary Plan of Care Collaboration   IDT Collaboration with  Nursing;Occupational Therapist   Patient Position at End of Therapy Seated;Call Light within Reach;Tray Table within Reach;Phone within Reach;Family / Friend in Room   Collaboration Comments Vitals, POC   PT Total Time Spent   PT Individual Total Time Spent (Mins) 60   PT Charge Group   PT Therapeutic Exercise 1   PT Therapeutic Activities 3       FIM Wheelchair Score:  5 - Standby Prompting/Supervision or Set-up  Wheelchair Description:  Adaptive equipment, Extra time, Safety concerns, Supervision for safety, Verbal cueing(x150 feet with BUE and SPV)    Pt reported he had chosen  for his prosthetic company.  Discussion of process of getting IPOP and continued education on prosthetic time line.  Pt given seated and supine HEP.        Assessment    Pt  with improvement in sitting tolerance and BP this session with abdominal binder.  Pt's initial BP at 106/57, dropping to 97/69 with sitting and use of binder.  Pt's BP taken at 3 separate intervals throughout session and pt's BP remained around this measurement.  Pt was asymptomatic throughout.  Pt demonstrated improving w/c mobility but remains limited secondary to decreased endurance.      Plan    Gait with FWW, edu in BKA stretching/strengthening/positioning/contacture avoidance, functional transfers, endurnace

## 2020-01-02 NOTE — THERAPY
"Occupational Therapy  Daily Treatment     Patient Name: Israel Aviles  Age:  54 y.o., Sex:  male  Medical Record #: [id number]  Today's Date: 1/2/2020     Precautions  Precautions: (P) Non Weight Bearing Left Lower Extremity, Immobilizer Left Lower Extremity, Fall Risk, Other (See Comments)  Comments: (P) Abdominal binder for Hypotension; MDRO Contact precautions; L BKA    Safety   ADL Safety : Requires Supervision for Safety  Bathroom Safety: Requires Supervision for Safety  Comments: variable BP requiring supervision     Subjective    \"I need to rest for a minute\"     Objective       01/02/20 1031   Precautions   Precautions Non Weight Bearing Left Lower Extremity;Immobilizer Left Lower Extremity;Fall Risk;Other (See Comments)   Comments Abdominal binder for Hypotension; MDRO Contact precautions; L BKA   Vitals   Patient BP Position Sitting   Blood Pressure 107/69  (104/71)   Vitals Comments BP monitored during STS transfers.   Cognition    Level of Consciousness Alert   Sitting Upper Body Exercises   Other Exercise BUE/Core strengthening exercises performed seated EOM with 6# medicine ball, 2 sets of 10 each: bicep curls, front arm raise, chest press, Russian twists and shoulder press. Rest breaks between each exercise d/t fatigue.   Balance   Sitting Balance (Static) Good   Sitting Balance (Dynamic) Good   Standing Balance (Static) Fair +   Standing Balance (Dynamic) Fair -   Weight Shift Sitting Good   Weight Shift Standing Absent  (L BKA)   Skilled Intervention Verbal Cuing   Comments Blocked practice of Sit<>stand transfers from EOM<>FWW, 2 sets of 10 with CGA-Close SBA and increased time. Standing at FWW, reach with RUE outside GILLIAN to retrieve and complete beanbag toss to address standing tolerance and balance. Pt completed 2 sets of 15 with no LOB observed. BP monitored during standing therex as noted above.    Interdisciplinary Plan of Care Collaboration   IDT Collaboration with  Nursing   Patient " Position at End of Therapy In Bed;Call Light within Reach;Tray Table within Reach   Collaboration Comments RN re: MDRO prec   OT Total Time Spent   OT Individual Total Time Spent (Mins) 30   OT Charge Group   OT Therapeutic Exercise  2       Assessment    Pt tolerated session well with focus on BUE/core strengthening, standing tolerance/balance and endurance building. Pt with no c/o dizziness with standing and BP stable during standing therex as noted above. Pt con't to demo limited endurance, and required increased time and frequent rest breaks. Pt very motivated and participatory.     Plan    Continue OT to address ADL/IADL independence, endurance, functional transfers, and BUE strength. Family training for ADLs

## 2020-01-03 PROBLEM — R00.0 TACHYCARDIA: Status: ACTIVE | Noted: 2020-01-03

## 2020-01-03 LAB
GLUCOSE BLD-MCNC: 180 MG/DL (ref 65–99)
GLUCOSE BLD-MCNC: 189 MG/DL (ref 65–99)
GLUCOSE BLD-MCNC: 324 MG/DL (ref 65–99)

## 2020-01-03 PROCEDURE — 97116 GAIT TRAINING THERAPY: CPT

## 2020-01-03 PROCEDURE — 770010 HCHG ROOM/CARE - REHAB SEMI PRIVAT*

## 2020-01-03 PROCEDURE — 700102 HCHG RX REV CODE 250 W/ 637 OVERRIDE(OP): Performed by: HOSPITALIST

## 2020-01-03 PROCEDURE — A9270 NON-COVERED ITEM OR SERVICE: HCPCS | Performed by: PHYSICAL MEDICINE & REHABILITATION

## 2020-01-03 PROCEDURE — 97530 THERAPEUTIC ACTIVITIES: CPT

## 2020-01-03 PROCEDURE — A9270 NON-COVERED ITEM OR SERVICE: HCPCS | Performed by: HOSPITALIST

## 2020-01-03 PROCEDURE — 700111 HCHG RX REV CODE 636 W/ 250 OVERRIDE (IP): Performed by: PHYSICAL MEDICINE & REHABILITATION

## 2020-01-03 PROCEDURE — 700102 HCHG RX REV CODE 250 W/ 637 OVERRIDE(OP): Performed by: PHYSICAL MEDICINE & REHABILITATION

## 2020-01-03 PROCEDURE — 99232 SBSQ HOSP IP/OBS MODERATE 35: CPT | Performed by: PHYSICAL MEDICINE & REHABILITATION

## 2020-01-03 PROCEDURE — 82962 GLUCOSE BLOOD TEST: CPT

## 2020-01-03 PROCEDURE — 97110 THERAPEUTIC EXERCISES: CPT

## 2020-01-03 PROCEDURE — 99232 SBSQ HOSP IP/OBS MODERATE 35: CPT | Performed by: HOSPITALIST

## 2020-01-03 RX ORDER — GLIPIZIDE 5 MG/1
5 TABLET ORAL
Status: DISCONTINUED | OUTPATIENT
Start: 2020-01-03 | End: 2020-01-04

## 2020-01-03 RX ORDER — AMOXICILLIN 250 MG
2 CAPSULE ORAL 2 TIMES DAILY PRN
Status: DISCONTINUED | OUTPATIENT
Start: 2020-01-03 | End: 2020-01-10 | Stop reason: HOSPADM

## 2020-01-03 RX ORDER — POLYETHYLENE GLYCOL 3350 17 G/17G
1 POWDER, FOR SOLUTION ORAL
Status: DISCONTINUED | OUTPATIENT
Start: 2020-01-03 | End: 2020-01-10 | Stop reason: HOSPADM

## 2020-01-03 RX ORDER — BISACODYL 10 MG
10 SUPPOSITORY, RECTAL RECTAL
Status: DISCONTINUED | OUTPATIENT
Start: 2020-01-03 | End: 2020-01-10 | Stop reason: HOSPADM

## 2020-01-03 RX ADMIN — SPIRONOLACTONE 12.5 MG: 25 TABLET ORAL at 05:19

## 2020-01-03 RX ADMIN — VITAMIN D, TAB 1000IU (100/BT) 1000 UNITS: 25 TAB at 07:58

## 2020-01-03 RX ADMIN — ASPIRIN 81 MG: 81 TABLET, COATED ORAL at 07:58

## 2020-01-03 RX ADMIN — OXYCODONE HYDROCHLORIDE AND ACETAMINOPHEN 500 MG: 500 TABLET ORAL at 07:59

## 2020-01-03 RX ADMIN — HEPARIN SODIUM 5000 UNITS: 5000 INJECTION, SOLUTION INTRAVENOUS; SUBCUTANEOUS at 05:19

## 2020-01-03 RX ADMIN — OXYCODONE HYDROCHLORIDE AND ACETAMINOPHEN 500 MG: 500 TABLET ORAL at 21:24

## 2020-01-03 RX ADMIN — ATORVASTATIN CALCIUM 80 MG: 40 TABLET, FILM COATED ORAL at 21:24

## 2020-01-03 RX ADMIN — METFORMIN HYDROCHLORIDE 1000 MG: 500 TABLET ORAL at 17:45

## 2020-01-03 RX ADMIN — HEPARIN SODIUM 5000 UNITS: 5000 INJECTION, SOLUTION INTRAVENOUS; SUBCUTANEOUS at 13:30

## 2020-01-03 RX ADMIN — MULTIPLE VITAMINS W/ MINERALS TAB 1 TABLET: TAB at 07:59

## 2020-01-03 RX ADMIN — CLOPIDOGREL BISULFATE 75 MG: 75 TABLET ORAL at 21:24

## 2020-01-03 RX ADMIN — GLIPIZIDE 5 MG: 5 TABLET ORAL at 08:01

## 2020-01-03 RX ADMIN — GLIPIZIDE 5 MG: 5 TABLET ORAL at 17:45

## 2020-01-03 RX ADMIN — FERROUS SULFATE TAB 325 MG (65 MG ELEMENTAL FE) 325 MG: 325 (65 FE) TAB at 07:59

## 2020-01-03 RX ADMIN — METFORMIN HYDROCHLORIDE 1000 MG: 500 TABLET ORAL at 07:59

## 2020-01-03 ASSESSMENT — ENCOUNTER SYMPTOMS
NAUSEA: 0
FEVER: 0
ABDOMINAL PAIN: 0
NERVOUS/ANXIOUS: 0
VOMITING: 0
CHILLS: 0
DIARRHEA: 0
SHORTNESS OF BREATH: 0

## 2020-01-03 NOTE — PROGRESS NOTES
Rehab Progress Note     Encounter Date: 1/3/2020    CC: CARLY BKA, weakness, DM    Interval Events (Subjective)  Patient sitting up in therapy. He reports some low BP this morning but he was asymptomatic. He reports he is able to perform therapy without an issue. HR remains mildly tachycardic most likely from low BP.     IDT Team Meeting 12/31/2019  DC/Disposition:  1/9/20    Objective:  VITAL SIGNS: /68   Pulse 98   Temp 36.6 °C (97.8 °F) (Oral)   Resp 20   Ht 1.829 m (6')   Wt 68.5 kg (151 lb 0.2 oz)   SpO2 96%   BMI 20.48 kg/m²   Gen: NAD  Psych: Mood and affect appropriate  CV: RRR, no edema  Resp: CTAB, no upper airway sounds  Abd: NTND  Neuro: AOx4, hopping with FWW    Recent Results (from the past 72 hour(s))   ACCU-CHEK GLUCOSE    Collection Time: 12/31/19  5:30 PM   Result Value Ref Range    Glucose - Accu-Ck 174 (H) 65 - 99 mg/dL   ACCU-CHEK GLUCOSE    Collection Time: 12/31/19  8:08 PM   Result Value Ref Range    Glucose - Accu-Ck 226 (H) 65 - 99 mg/dL   ACCU-CHEK GLUCOSE    Collection Time: 01/01/20  8:07 AM   Result Value Ref Range    Glucose - Accu-Ck 150 (H) 65 - 99 mg/dL   ACCU-CHEK GLUCOSE    Collection Time: 01/01/20 11:20 AM   Result Value Ref Range    Glucose - Accu-Ck 175 (H) 65 - 99 mg/dL   ACCU-CHEK GLUCOSE    Collection Time: 01/01/20  5:40 PM   Result Value Ref Range    Glucose - Accu-Ck 204 (H) 65 - 99 mg/dL   ACCU-CHEK GLUCOSE    Collection Time: 01/01/20  8:42 PM   Result Value Ref Range    Glucose - Accu-Ck 236 (H) 65 - 99 mg/dL   ACCU-CHEK GLUCOSE    Collection Time: 01/02/20  7:19 AM   Result Value Ref Range    Glucose - Accu-Ck 171 (H) 65 - 99 mg/dL   ACCU-CHEK GLUCOSE    Collection Time: 01/02/20 11:09 AM   Result Value Ref Range    Glucose - Accu-Ck 170 (H) 65 - 99 mg/dL   ACCU-CHEK GLUCOSE    Collection Time: 01/02/20  5:08 PM   Result Value Ref Range    Glucose - Accu-Ck 280 (H) 65 - 99 mg/dL   ACCU-CHEK GLUCOSE    Collection Time: 01/02/20  9:27 PM   Result Value Ref  Range    Glucose - Accu-Ck 200 (H) 65 - 99 mg/dL   ACCU-CHEK GLUCOSE    Collection Time: 01/03/20  7:16 AM   Result Value Ref Range    Glucose - Accu-Ck 180 (H) 65 - 99 mg/dL   ACCU-CHEK GLUCOSE    Collection Time: 01/03/20 11:17 AM   Result Value Ref Range    Glucose - Accu-Ck 189 (H) 65 - 99 mg/dL       Current Facility-Administered Medications   Medication Frequency   • glipiZIDE (GLUCOTROL) tablet 5 mg BID AC   • therapeutic multivitamin-minerals (THERAGRAN-M) tablet 1 Tab DAILY   • ascorbic acid tablet 500 mg BID   • spironolactone (ALDACTONE) tablet 12.5 mg Q DAY   • glucose 4 g chewable tablet 16 g Q15 MIN PRN    And   • dextrose 50% (D50W) injection 50 mL Q15 MIN PRN   • vitamin D (cholecalciferol) 1000 UNIT tablet 1,000 Units DAILY   • Respiratory Care per Protocol Continuous RT   • Pharmacy Consult Request ...Pain Management Review 1 Each PHARMACY TO DOSE   • hydrALAZINE (APRESOLINE) tablet 25 mg Q8HRS PRN   • acetaminophen (TYLENOL) tablet 650 mg Q4HRS PRN   • senna-docusate (PERICOLACE or SENOKOT S) 8.6-50 MG per tablet 2 Tab BID    And   • polyethylene glycol/lytes (MIRALAX) PACKET 1 Packet QDAY PRN    And   • magnesium hydroxide (MILK OF MAGNESIA) suspension 30 mL QDAY PRN    And   • bisacodyl (DULCOLAX) suppository 10 mg QDAY PRN   • artificial tears ophthalmic solution 1 Drop PRN   • benzocaine-menthol (CEPACOL) lozenge 1 Lozenge Q2HRS PRN   • mag hydrox-al hydrox-simeth (MAALOX PLUS ES or MYLANTA DS) suspension 20 mL Q2HRS PRN   • ondansetron (ZOFRAN ODT) dispertab 4 mg 4X/DAY PRN    Or   • ondansetron (ZOFRAN) syringe/vial injection 4 mg 4X/DAY PRN   • traZODone (DESYREL) tablet 50 mg QHS PRN   • sodium chloride (OCEAN) 0.65 % nasal spray 2 Spray PRN   • aspirin EC (ECOTRIN) tablet 81 mg DAILY   • atorvastatin (LIPITOR) tablet 80 mg Nightly   • clopidogrel (PLAVIX) tablet 75 mg QHS   • ferrous sulfate tablet 325 mg QDAY with Breakfast   • heparin injection 5,000 Units Q8HRS   • metFORMIN  (GLUCOPHAGE) tablet 1,000 mg BID WITH MEALS   • oxyCODONE-acetaminophen (PERCOCET) 5-325 MG per tablet 1-2 Tab Q4HRS PRN       Orders Placed This Encounter   Procedures   • Diet Order Diabetic     Standing Status:   Standing     Number of Occurrences:   1     Order Specific Question:   Diet:     Answer:   Diabetic [3]     Order Specific Question:   Miscellaneous modifications:     Answer:   Vegetarian [13]       Assessment:  Active Hospital Problems    Diagnosis   • *S/P BKA (below knee amputation) unilateral, left ([facility])   • PVD (peripheral vascular disease) ([facility])   • Vitamin D deficiency   • CAD (coronary artery disease)   • HTN (hypertension)   • Normocytic anemia   • Diabetic polyneuropathy associated with type 2 diabetes mellitus ([facility])   • Hyperlipidemia   • Uncontrolled type 2 diabetes mellitus with hyperglycemia ([facility])       Medical Decision Making and Plan:  L BKA - Patient with 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 Zhao on 12/20/19.  -PT and OT for mobility and ADLs  -NWB LLE. IPOP to LLE     Neuropathic pain - Patient would prefer no medications. PT working on desensitization and mirror therapy.     DM - Patient on Glipizide 5 mg qAM, Metformin 1000 mg BID and fingersticks   -Consult Hospitalist. Started on Lantus     HTN/CV/PAD - Patient on ASA 81 mg and Plavix 75 QPM. Patient with recent TTE with EF of 40%. Patient on Coreg 6.25 mg, Spironolactone 25 mg daily, and Valsartan 40 mg BID  -Ongoing Hypotension, discussed with hospitalist. Decrease Spironolactone 12.5 mg daily. Discontinue Valsartan. Coreg discontinued, now with tachycardia   -Start Abdominal binder     HLD - Patient on Atorvastatin 80 mg QHS     Anemia - Patient on Fe supplement on transfer. Check AM CBC - 10 on admission.      Vitamin D - deficient on admission. Start 1000 U     DVT Ppx - Patient on Heparin on transfer. Hopping > 100 feet, discontinue Heparin.      Total time:  27 minutes.  I spent greater than 50% of  the time for patient care, counseling, and coordination on this date, including unit/floor time, and face-to-face time with the patient as per interval events and assessment and plan above. Topics discussed included low SBP, start abdominal binder, and discontinue Heparin.     Oliverio Bai M.D.

## 2020-01-03 NOTE — THERAPY
Physical Therapy   Daily Treatment     Patient Name: Israel Aviles  Age:  54 y.o., Sex:  male  Medical Record #: [id number]  Today's Date: 1/3/2020     Precautions  Precautions: Non Weight Bearing Left Lower Extremity, Immobilizer Left Lower Extremity, Fall Risk, Other (See Comments)  Comments: Abdominal binder for Hypotension; MDRO Contact precautions; L BKA    Subjective    Pt was supine in bed upon arrival and agreeable to treatment.       Objective       01/03/20 1001   Precautions   Precautions Non Weight Bearing Left Lower Extremity;Immobilizer Left Lower Extremity;Fall Risk;Other (See Comments)   Comments Abdominal binder for Hypotension; MDRO Contact precautions; L BKA   Vitals   Patient BP Position Sitting   Blood Pressure 106/70   Vitals Comments Abdominal binder applied    Bed Mobility    Supine to Sit Modified Independent   Sit to Supine Modified Independent   Scooting Modified Independent   Rolling Modified Independent   Interdisciplinary Plan of Care Collaboration   IDT Collaboration with  Occupational Therapist;Physician;Other (See Comments)  (Therapy supervisor)   Patient Position at End of Therapy Seated;Call Light within Reach;Tray Table within Reach;Phone within Reach   Collaboration Comments Claification of precautions   PT Total Time Spent   PT Individual Total Time Spent (Mins) 30   PT Charge Group   PT Therapeutic Activities 2       FIM Bed/Chair/Wheelchair Transfers Score: 5 - Standby Prompting/Supervision or Set-up  Bed/Chair/Wheelchair Transfers Description:  Adaptive equipment, Increased time, Supervision for safety, Verbal cueing, Set-up of equipment(Bed mobility mod I; SQPT with SPV/set up)    FIM Wheelchair Score:  6 - Modified Independent  Wheelchair Description:  Extra time, Adaptive equipment(x200 feet x 2 with BUE, mod I)    Discussion of POC, precautions, IPOP, car transfer. Time spent with MD, nursing staff, and therapy supervisor collaborating to determine pt's precautions.       Assessment    Pt demonstrated improvement in managing w/c components and endurance with w/c propulsion.  Per MD, all hospital staff should adhere to special contact precautions (gown/glove in room, pt can come out of room, bleach wipes utilized on equipment post use, staff hand washing after leaving room) and pt and pt's spouse should wash hands after leaving room.      Plan    Gait with FWW monitoring vitals throughout as pt with tendency toward hypotension-especially with standing activities, continue LE strengthening and stretching program, endurance training, progress toward independence with transfers, continue family training with pt's spouse.

## 2020-01-03 NOTE — CARE PLAN
Problem: Safety  Goal: Will remain free from falls  Outcome: PROGRESSING AS EXPECTED  Note:   Patient uses call light consistently and appropriately this shift.  Waits for assistance when needed and does not attempt self transfer.  Able to verbalize needs.  Will continue to monitor.       Problem: Infection  Goal: Will remain free from infection  Outcome: PROGRESSING AS EXPECTED  Note:   Patient remains free from s/s infection; afebrile.  Will continue to monitor.

## 2020-01-03 NOTE — PROGRESS NOTES
1917 received report from day shift nurse and assume patient care. Pt is calm and stable . Denies any pain at this time. No s/sx of distress. Safety precautions in place. Call light with in reach. Will continue to monitor.

## 2020-01-03 NOTE — THERAPY
Physical Therapy   Daily Treatment     Patient Name: Israel Aviles  Age:  54 y.o., Sex:  male  Medical Record #: [id number]  Today's Date: 1/2/2020     Precautions  Precautions: Non Weight Bearing Left Lower Extremity, Immobilizer Left Lower Extremity, Fall Risk, Other (See Comments)  Comments: Abdominal binder for Hypotension; MDRO Contact precautions; L BKA    Subjective    Pt was supine in bed upon arrival and agreeable to treatment.  Pt reported fatigue.       Objective       01/02/20 1431   Precautions   Precautions Non Weight Bearing Left Lower Extremity;Immobilizer Left Lower Extremity;Fall Risk;Other (See Comments)   Vitals   Pulse (!) 106   Blood Pressure 108/71   Supine Lower Body Exercise   Bridges 1 set of 15;Two Legged  (bolster)   Straight Leg Raises 1 set of 15;Bilateral   Short Arc Quad 1 set of 15;Bilateral   Ankle Pumps 1 set of 15;Right   Bed Mobility    Supine to Sit Modified Independent   Sit to Supine Modified Independent   Scooting Modified Independent   Rolling Modified Independent   Interdisciplinary Plan of Care Collaboration   IDT Collaboration with  Nursing   Patient Position at End of Therapy In Bed;Call Light within Reach;Tray Table within Reach;Phone within Reach   Collaboration Comments Vitals   PT Total Time Spent   PT Individual Total Time Spent (Mins) 60   PT Charge Group   PT Therapeutic Exercise 2   PT Therapeutic Activities 2       FIM Bed/Chair/Wheelchair Transfers Score: 5 - Standby Prompting/Supervision or Set-up  Bed/Chair/Wheelchair Transfers Description:  Adaptive equipment, Increased time, Supervision for safety, Set-up of equipment, Verbal cueing(Bed mobility mod I; SQPT with SBA/SPV)    Transfer training from w/c to bed x 4 reps using SQPT method with focus on sequencing and safety.  Review of HEP.  Pt educated and given handouts on residual limb wrapping, HEP, positioning, and sensory information.      Assessment    Pt with more consistent BP this session, but was  limited secondary to fatigue.      Plan    Gait with FWW, edu in BKA stretching/strengthening/positioning/contacture avoidance, functional transfers, endurnace

## 2020-01-03 NOTE — THERAPY
"Occupational Therapy  Daily Treatment     Patient Name: Israel Aviles  Age:  54 y.o., Sex:  male  Medical Record #: [id number]  Today's Date: 1/3/2020     Precautions  Precautions: Non Weight Bearing Left Lower Extremity, Immobilizer Left Lower Extremity, Fall Risk, Other (See Comments)  Comments: Abdominal binder for Hypotension; MDRO Contact precautions; L BKA    Safety   ADL Safety : Requires Supervision for Safety  Bathroom Safety: Requires Supervision for Safety  Comments: Supervision due to variable BP and orthostatic hypotension     Subjective    Pt was seated EOB and agreeable to therapy. \" I would like to do it myself\" Re: w/c> bed transfer. Pt educated that due to orthostatic hypotension, transfers should be done with supervision of a staff and pt verbalized understanding.      Objective       01/03/20 1100   Precautions   Precautions Non Weight Bearing Left Lower Extremity;Immobilizer Left Lower Extremity;Fall Risk;Other (See Comments)   Comments Abdominal binder for Hypotension; MDRO Contact precautions; L BKA   Safety    ADL Safety  Requires Supervision for Safety   Bathroom Safety Requires Supervision for Safety   Comments Supervision due to variable BP and orthostatic hypotension    Vitals   Patient BP Position Standing 1 minute   Blood Pressure (!) 86/57   Vitals Comments Abdominal binder applied    Pain 0 - 10 Group   Pain Rating Scale (NPRS) 0   Cognition    Level of Consciousness Alert   Standing Lower Body Exercises   Standing Lower Body Exercises Yes   Hip Flexion 2 sets of 10;Left  (with 3 second isometric hold )   Hip Abduction 1 set of 10   Heel Rise 2 sets of 10   Mini Squat 1 set of 10;Partial   Balance   Sitting Balance (Static) Normal   Sitting Balance (Dynamic) Good   Standing Balance (Static) Fair   Standing Balance (Dynamic) Fair   Weight Shift Sitting Good   Weight Shift Standing Absent   Bed Mobility    Supine to Sit Modified Independent   Sit to Supine Modified Independent "   Interdisciplinary Plan of Care Collaboration   Patient Position at End of Therapy In Bed;Call Light within Reach;Tray Table within Reach   OT Total Time Spent   OT Individual Total Time Spent (Mins) 30   OT Charge Group   OT Therapeutic Exercise  2       FIM Bed/Chair/Wheelchair Transfers Score: 5 - Standby Prompting/Supervision or Set-up  Bed/Chair/Wheelchair Transfers Description:  Verbal cueing, Supervision for safety      Assessment    Pt participated in therapeutic exercise in parallel bars as listed above with BP monitoring and 2- 5 minute rest breaks between sets. Pt began with seated BP of 108/66. Pt stood for three minutes completing exercises with BP at 86/57 and pt reporting lightheadedness. Similar BP reading were noted for the following standing exercise session of 3 minutes. During the third set of standing exercises, the pt BP was 101/62. Pt continues to be limited by orthostatic hypotension. Pt demonstrates good safety awareness and strength, however, is limited in endurance and activity tolerance.     Plan     Continue OT to address ADL/IADL independence, endurance, BUE strength, education on AE/DME, functional transfers, progressing to completing ADLs at walker level

## 2020-01-03 NOTE — ASSESSMENT & PLAN NOTE
HR   ? 2nd to dehydration -- see other assessment  Off Aldactone: 12.5 mg daily (last dose 1/4)  Cont to monitor

## 2020-01-03 NOTE — THERAPY
Occupational Therapy  Daily Treatment     Patient Name: Israel Aviles  Age:  54 y.o., Sex:  male  Medical Record #: [id number]  Today's Date: 1/3/2020     Precautions  Precautions: Non Weight Bearing Left Lower Extremity, Immobilizer Left Lower Extremity, Fall Risk, Other (See Comments)  Comments: Abdominal binder for Hypotension; MDRO Contact precautions; L BKA    Safety   ADL Safety : Requires Supervision for Safety  Bathroom Safety: Requires Supervision for Safety  Comments: Supervision due to variable BP and orthostatic hypotension     Subjective    Pt was seated in bed and agreeable to therapy. Pt's wife was present,however, asleep for most of the session.      Objective       01/03/20 0831   Precautions   Precautions Immobilizer Left Lower Extremity;Fall Risk;Other (See Comments);Non Weight Bearing Left Lower Extremity   Comments Abdominal binder for Hypotension; MDRO Contact precautions; L BKA   Vitals   Pulse 99   Patient BP Position Sitting   Blood Pressure 106/69   Room Air Oximetry 100   O2 Delivery None (Room Air)   Pain 0 - 10 Group   Pain Rating Scale (NPRS) 0   Cognition    Level of Consciousness Alert   Sitting Upper Body Exercises   Internal Shoulder Rotation 2 sets of 15;Right ;Left;Medium Resistance Theraband   External Shoulder Rotation 3 sets of 15;Right ;Left;Medium Resistance Theraband   Bilateral Row Bilateral;2 sets of 15;Medium Resistance Theraband   Bicep Curls 2 sets of 15;Right ;Left;Medium Resistance Theraband   Tricep Press 2 sets of 15;Right ;Left;Medium Resistance Theraband   Standing Lower Body Exercises   Heel Rise 2 sets of 10;Right  (at FWW)   Mini Squat Partial;1 set of 10  (at FWW)   Interdisciplinary Plan of Care Collaboration   Patient Position at End of Therapy Seated;Self Releasing Lap Belt Applied;Call Light within Reach;Tray Table within Reach;Family / Friend in Room   OT Total Time Spent   OT Individual Total Time Spent (Mins) 60   OT Charge Group   OT Therapeutic  Exercise  4     FIM Bed/Chair/Wheelchair Transfers Score: 5 - Standby Prompting/Supervision or Set-up  Bed/Chair/Wheelchair Transfers Description:  Verbal cueing, Supervision for safety    Sit to stand transfers X 3 for orthostatic BP monitoring as follows: supine 106/69, sitting 98/68, standing 1 minute 96/63, standing 3 minutes 89/62 with abdominal binder on pt.     Assessment    Pt completed seated BUE therapeutic exercise without complaint of fatigue. Pt barriers include NWB of LLE, orthostatic hypotension, and limited endurance/activity tolerance.     Plan    Continue OT to address ADL/IADL independence, endurance, functional transfers, and BUE strength. Family training for ADLs

## 2020-01-03 NOTE — PROGRESS NOTES
[facility] Medicine Daily Progress Note    Date of Service  1/3/2020    Chief Complaint:  Hypertension  Diabetes  Fluid overload    Interval History:  No significant events or changes since last visit    Review of Systems  Review of Systems   Constitutional: Negative for chills and fever.   Respiratory: Negative for shortness of breath.    Cardiovascular: Negative for chest pain.   Gastrointestinal: Negative for abdominal pain, diarrhea, nausea and vomiting.   Psychiatric/Behavioral: The patient is not nervous/anxious.         Physical Exam  Temp:  [36.8 °C (98.2 °F)-36.9 °C (98.5 °F)] 36.8 °C (98.2 °F)  Pulse:  [] 94  Resp:  [16-20] 18  BP: ()/(64-80) 99/64  SpO2:  [96 %-98 %] 98 %    Physical Exam  Vitals signs and nursing note reviewed.   Constitutional:       Appearance: Normal appearance.   HENT:      Head: Atraumatic.   Eyes:      Conjunctiva/sclera: Conjunctivae normal.      Pupils: Pupils are equal, round, and reactive to light.   Neck:      Musculoskeletal: Normal range of motion and neck supple.   Cardiovascular:      Rate and Rhythm: Normal rate and regular rhythm.   Pulmonary:      Effort: Pulmonary effort is normal.      Breath sounds: Normal breath sounds.   Abdominal:      General: Bowel sounds are normal.      Palpations: Abdomen is soft.   Musculoskeletal:      Comments: Has left BKA   Skin:     General: Skin is warm and dry.   Neurological:      Mental Status: He is alert and oriented to person, place, and time.   Psychiatric:         Mood and Affect: Mood normal.         Behavior: Behavior normal.         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 Zhao  S/P IV abx per Infectious  Diseases    Tachycardia  Assessment & Plan  HR more elevated recently at   ? 2nd to dehydration  Note: on Aldactone  Will check am kidney function  Cont to monitor    Azotemia  Assessment & Plan  Bun: 19 (12/28) --> 25 (12/30)  ? 2nd to Aldactone  Encouraginge fluid (water/juice) intake  Monitor    HTN (hypertension)  Assessment & Plan  BP a little better with SBP   Off Diovan (last dose 12/31 am)  Off Coreg (1/1)  On Aldactone: 12.5 mg daily  Cont to monitor    CAD (coronary artery disease)  Assessment & Plan  S/P NSTEMI  Echo: EF 45%  BNP: 6008 (12/18) --> 2535 (12/30)  Has 3 vessel disease per cardiac cath on 12/16/19 -- recommendations for medical management  On ASA  On Lipitor  Off Diovan & Coreg -- 2nd to low BP  On Aldactone  Note: need out patient Cardiology follow up    Vitamin D deficiency  Assessment & Plan  Vit D: 20  On supplements    Normocytic anemia- (present on admission)  Assessment & Plan  H&H has been improving  On Fe supplements     Uncontrolled type 2 diabetes mellitus with hyperglycemia (HCC)- (present on admission)  Assessment & Plan  Hba1c: 8.5  BS still elevated: 170-280  Off Lantus  On Metformin: 1000 mg bid  On Glipizide: 5 mg bid --> 2.5 mg bid (12/29 evening) --> 5 mg qam & 2.5 mg qpm (1/2) --> will increase to 5 mg bid (1/3)  Note: home meds include Metformin 1000 mg bid and Glipizide 5 mg bid  Cont to monitor    PVD (peripheral vascular disease) (HCC)  Assessment & Plan  Has hx of prior LLE revascularization  On ASA & Plavix  On Lipitor

## 2020-01-04 LAB
ANION GAP SERPL CALC-SCNC: 10 MMOL/L (ref 0–11.9)
BUN SERPL-MCNC: 31 MG/DL (ref 8–22)
CALCIUM SERPL-MCNC: 9.3 MG/DL (ref 8.5–10.5)
CHLORIDE SERPL-SCNC: 97 MMOL/L (ref 96–112)
CO2 SERPL-SCNC: 26 MMOL/L (ref 20–33)
CREAT SERPL-MCNC: 0.69 MG/DL (ref 0.5–1.4)
GLUCOSE BLD-MCNC: 180 MG/DL (ref 65–99)
GLUCOSE BLD-MCNC: 184 MG/DL (ref 65–99)
GLUCOSE BLD-MCNC: 186 MG/DL (ref 65–99)
GLUCOSE BLD-MCNC: 269 MG/DL (ref 65–99)
GLUCOSE BLD-MCNC: 279 MG/DL (ref 65–99)
GLUCOSE SERPL-MCNC: 221 MG/DL (ref 65–99)
MAGNESIUM SERPL-MCNC: 1.7 MG/DL (ref 1.5–2.5)
NT-PROBNP SERPL IA-MCNC: 2906 PG/ML (ref 0–125)
PHOSPHATE SERPL-MCNC: 3.4 MG/DL (ref 2.5–4.5)
POTASSIUM SERPL-SCNC: 4.7 MMOL/L (ref 3.6–5.5)
SODIUM SERPL-SCNC: 133 MMOL/L (ref 135–145)

## 2020-01-04 PROCEDURE — 700102 HCHG RX REV CODE 250 W/ 637 OVERRIDE(OP): Performed by: HOSPITALIST

## 2020-01-04 PROCEDURE — 36415 COLL VENOUS BLD VENIPUNCTURE: CPT

## 2020-01-04 PROCEDURE — 83735 ASSAY OF MAGNESIUM: CPT

## 2020-01-04 PROCEDURE — 80048 BASIC METABOLIC PNL TOTAL CA: CPT

## 2020-01-04 PROCEDURE — A9270 NON-COVERED ITEM OR SERVICE: HCPCS | Performed by: HOSPITALIST

## 2020-01-04 PROCEDURE — 700102 HCHG RX REV CODE 250 W/ 637 OVERRIDE(OP): Performed by: PHYSICAL MEDICINE & REHABILITATION

## 2020-01-04 PROCEDURE — 99232 SBSQ HOSP IP/OBS MODERATE 35: CPT | Performed by: HOSPITALIST

## 2020-01-04 PROCEDURE — 770010 HCHG ROOM/CARE - REHAB SEMI PRIVAT*

## 2020-01-04 PROCEDURE — 82962 GLUCOSE BLOOD TEST: CPT

## 2020-01-04 PROCEDURE — A9270 NON-COVERED ITEM OR SERVICE: HCPCS | Performed by: PHYSICAL MEDICINE & REHABILITATION

## 2020-01-04 PROCEDURE — 84100 ASSAY OF PHOSPHORUS: CPT

## 2020-01-04 PROCEDURE — 83880 ASSAY OF NATRIURETIC PEPTIDE: CPT

## 2020-01-04 RX ORDER — GLIPIZIDE 5 MG/1
10 TABLET ORAL
Status: DISCONTINUED | OUTPATIENT
Start: 2020-01-04 | End: 2020-01-10 | Stop reason: HOSPADM

## 2020-01-04 RX ORDER — GLIPIZIDE 5 MG/1
5 TABLET ORAL ONCE
Status: COMPLETED | OUTPATIENT
Start: 2020-01-04 | End: 2020-01-04

## 2020-01-04 RX ADMIN — METFORMIN HYDROCHLORIDE 1000 MG: 500 TABLET ORAL at 17:20

## 2020-01-04 RX ADMIN — GLIPIZIDE 5 MG: 5 TABLET ORAL at 11:26

## 2020-01-04 RX ADMIN — ASPIRIN 81 MG: 81 TABLET, COATED ORAL at 07:56

## 2020-01-04 RX ADMIN — FERROUS SULFATE TAB 325 MG (65 MG ELEMENTAL FE) 325 MG: 325 (65 FE) TAB at 07:56

## 2020-01-04 RX ADMIN — GLIPIZIDE 5 MG: 5 TABLET ORAL at 07:56

## 2020-01-04 RX ADMIN — OXYCODONE HYDROCHLORIDE AND ACETAMINOPHEN 500 MG: 500 TABLET ORAL at 07:56

## 2020-01-04 RX ADMIN — GLIPIZIDE 10 MG: 5 TABLET ORAL at 17:20

## 2020-01-04 RX ADMIN — CLOPIDOGREL BISULFATE 75 MG: 75 TABLET ORAL at 21:37

## 2020-01-04 RX ADMIN — VITAMIN D, TAB 1000IU (100/BT) 1000 UNITS: 25 TAB at 07:56

## 2020-01-04 RX ADMIN — OXYCODONE HYDROCHLORIDE AND ACETAMINOPHEN 500 MG: 500 TABLET ORAL at 21:37

## 2020-01-04 RX ADMIN — METFORMIN HYDROCHLORIDE 1000 MG: 500 TABLET ORAL at 07:56

## 2020-01-04 RX ADMIN — SPIRONOLACTONE 12.5 MG: 25 TABLET ORAL at 06:06

## 2020-01-04 RX ADMIN — ATORVASTATIN CALCIUM 80 MG: 40 TABLET, FILM COATED ORAL at 21:37

## 2020-01-04 RX ADMIN — MULTIPLE VITAMINS W/ MINERALS TAB 1 TABLET: TAB at 07:56

## 2020-01-04 ASSESSMENT — ENCOUNTER SYMPTOMS
FEVER: 0
HEADACHES: 0
DIZZINESS: 0
HALLUCINATIONS: 0
PALPITATIONS: 0
VOMITING: 0
SHORTNESS OF BREATH: 0
NAUSEA: 0
BLURRED VISION: 0

## 2020-01-04 NOTE — THERAPY
Physical Therapy   Daily Treatment     Patient Name: Israel Aviles  Age:  54 y.o., Sex:  male  Medical Record #: [id number]  Today's Date: 1/3/2020     Precautions  Precautions: Non Weight Bearing Left Lower Extremity, Immobilizer Left Lower Extremity, Fall Risk, Other (See Comments)  Comments: Abdominal binder for Hypotension; MDRO Contact precautions; L BKA    Subjective    Pt was supine in bed upon arrival and agreeable to treatment.  Pt had received IPOP this afternoon.      Objective       01/03/20 1401   Precautions   Precautions Non Weight Bearing Left Lower Extremity;Immobilizer Left Lower Extremity;Fall Risk;Other (See Comments)   Vitals   Pulse (!) 106   Patient BP Position Sitting   Blood Pressure (!) 98/65   O2 Delivery None (Room Air)   Sitting Lower Body Exercises   Long Arc Quad 2 sets of 15;Bilateral   Hamstring Curl 2 sets of 15;Bilateral;Medium Resistance Theraband   Bed Mobility    Supine to Sit Modified Independent   Sit to Supine Modified Independent   Sit to Stand Contact Guard Assist   Scooting Modified Independent   Rolling Modified Independent   Interdisciplinary Plan of Care Collaboration   Patient Position at End of Therapy Seated;Call Light within Reach;Tray Table within Reach;Phone within Reach   PT Total Time Spent   PT Individual Total Time Spent (Mins) 60   PT Charge Group   PT Gait Training 1   PT Therapeutic Activities 3       FIM Bed/Chair/Wheelchair Transfers Score: 5 - Standby Prompting/Supervision or Set-up  Bed/Chair/Wheelchair Transfers Description:  Adaptive equipment, Increased time, Supervision for safety, Set-up of equipment, Verbal cueing(Bed mobility and SQPT with SPV)    FIM Walking Score:  1 - Total Assistance  Walking Description:  Extra time, Safety concerns, Verbal cueing, Supervision for safety, Walker, Requires wheelchair follow(AMB x 125 feet with FWW and CGA with w/c follow for safety d/t pt's tendency for dizziness/hypotension)    FIM Wheelchair Score:  6  "- Modified Independent  Wheelchair Description:  Adaptive equipment, Extra time(x200 feet, x 400 feet mod I with BUE)    FIM Stairs Score:  1 - Total Assistance  Stairs Description:  Extra time, Safety concerns, Verbal cueing, Hand rails, Supervision for safety, Ascends/descends less than 4 steps(Up/down 2\" step in // bars and CGA)    Education provided on donning/doffing IPOP.  Trialed AMB in // bars initially x 20 feet with CGA.  Continued discussion of POC/goals.     Assessment    Pt demonstrated improvement in BP this session (sitting 98/65, standing x 1 min 93/64, sitting 109/67, sitting (after ambulation) 120/73) with decreased report of dizziness.  Pt with slowly improving activity tolerance.  Pt demonstrated good sequencing with FWW and dynamic balance.      Plan    Gait training with FWW monitoring vitals throughout as pt with tendency toward hypotension-especially with standing activities, continue LE strengthening and stretching program, continued education on residual limb wrapping, endurance training, progress toward independence with transfers, continue family training with pt's spouse.  Trial car transfer in pt's personal vehicle as able.     "

## 2020-01-04 NOTE — PROGRESS NOTES
[facility] Medicine Daily Progress Note    Date of Service  1/4/2020    Chief Complaint:  Hypertension  Diabetes  Fluid overload    Interval History:  No significant events or changes since last visit    Review of Systems  Review of Systems   Constitutional: Negative for fever.   Eyes: Negative for blurred vision.   Respiratory: Negative for shortness of breath.    Cardiovascular: Negative for palpitations.   Gastrointestinal: Negative for nausea and vomiting.   Neurological: Negative for dizziness and headaches.   Psychiatric/Behavioral: Negative for hallucinations.        Physical Exam  Temp:  [36.3 °C (97.4 °F)-37.1 °C (98.7 °F)] 36.3 °C (97.4 °F)  Pulse:  [] 94  Resp:  [18-20] 20  BP: ()/(57-75) 133/72  SpO2:  [96 %-98 %] 96 %    Physical Exam  Vitals signs and nursing note reviewed.   Constitutional:       General: He is not in acute distress.  HENT:      Mouth/Throat:      Mouth: Mucous membranes are moist.      Pharynx: Oropharynx is clear.   Eyes:      General: No scleral icterus.  Neck:      Musculoskeletal: No neck rigidity.   Cardiovascular:      Rate and Rhythm: Normal rate and regular rhythm.   Pulmonary:      Effort: Pulmonary effort is normal.      Breath sounds: No wheezing or rales.   Abdominal:      General: Bowel sounds are normal.      Palpations: Abdomen is soft.   Musculoskeletal:      Comments: Has left BKA   Skin:     General: Skin is warm and dry.   Neurological:      Mental Status: He is alert and oriented to person, place, and time.   Psychiatric:         Mood and Affect: Mood normal.         Behavior: Behavior normal.         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 Zhao  S/P IV abx per Infectious Diseases    Tachycardia  Assessment & Plan  HR generally ok but occ rises up a little  ? 2nd to dehydration -- see other assessment  On Aldactone: 12.5 mg daily --> will d/c (1/4)  Cont to monitor    Azotemia  Assessment & Plan  Bun: 19 (12/28) --> 25 (12/30) --> 31 (1/4)  On Aldactone: 12.5 mg daily --> will d/c (1/4)  Will re-encouraginge fluid (water/juice) intake (1/4)  Monitor    HTN (hypertension)  Assessment & Plan  BP low normal and occ dips lower  Has mild azotemia  Has mild anemia  TSH ok  Off Diovan (12/31 am)  Off Coreg (1/1)  On Aldactone: 12.5 mg daily --> will d/c (1/4)  Will check orthostatics (1/4)  Cont to monitor    CAD (coronary artery disease)  Assessment & Plan  S/P NSTEMI  Echo: EF 45%  BNP: 6008 (12/18) --> 2535 (12/30) --> 2906 (1/4)  Has 3 vessel disease per cardiac cath on 12/16/19 -- recommendations for medical management  Off Diovan & Coreg -- 2nd to low BP  On Aldactone: 12.5 mg daily --> will d/c (1/4)  On ASA  On Lipitor  Note: need out patient Cardiology follow up    Vitamin D deficiency  Assessment & Plan  Vit D: 20  On supplements    Normocytic anemia- (present on admission)  Assessment & Plan  H&H has been improving  On Fe supplements     Uncontrolled type 2 diabetes mellitus with hyperglycemia (HCC)- (present on admission)  Assessment & Plan  Hba1c: 8.5  BS still elevated: 180-324  Off Lantus  On Metformin: 1000 mg bid  On Glipizide: 2.5 mg bid (12/29) --> 5 mg qam & 2.5 mg qpm (1/2) --> 5 mg bid (1/3) --> will increase to 10 mg bid (1/4)  Note: home meds include Metformin 1000 mg bid and Glipizide 5 mg bid  Cont to monitor    PVD (peripheral vascular disease) (HCC)  Assessment & Plan  Has hx of prior LLE revascularization  On ASA & Plavix  On Lipitor

## 2020-01-04 NOTE — CARE PLAN
Problem: Safety  Goal: Will remain free from injury  Outcome: PROGRESSING AS EXPECTED     Problem: Infection  Goal: Will remain free from infection  Outcome: PROGRESSING AS EXPECTED  Note:   Pt is on special contact precautions for MDRO.      Problem: Bowel/Gastric:  Goal: Normal bowel function is maintained or improved  Outcome: PROGRESSING AS EXPECTED

## 2020-01-04 NOTE — PROGRESS NOTES
1715 received report from day shift nurse and assume patient care. Pt is calm and stable . Denies any pain at this time. No s/sx of distress. Safety precautions in place. Call light with in reach. Will continue to monitor.

## 2020-01-04 NOTE — CARE PLAN
Problem: Infection  Goal: Will remain free from infection  Outcome: PROGRESSING AS EXPECTED     Problem: Venous Thromboembolism (VTW)/Deep Vein Thrombosis (DVT) Prevention:  Goal: Patient will participate in Venous Thrombosis (VTE)/Deep Vein Thrombosis (DVT)Prevention Measures  Outcome: PROGRESSING AS EXPECTED     Problem: Bowel/Gastric:  Goal: Normal bowel function is maintained or improved  Outcome: PROGRESSING AS EXPECTED     Problem: Pain Management  Goal: Pain level will decrease to patient's comfort goal  Outcome: PROGRESSING AS EXPECTED

## 2020-01-05 LAB
GLUCOSE BLD-MCNC: 175 MG/DL (ref 65–99)
GLUCOSE BLD-MCNC: 195 MG/DL (ref 65–99)
GLUCOSE BLD-MCNC: 215 MG/DL (ref 65–99)
GLUCOSE BLD-MCNC: 216 MG/DL (ref 65–99)

## 2020-01-05 PROCEDURE — 770010 HCHG ROOM/CARE - REHAB SEMI PRIVAT*

## 2020-01-05 PROCEDURE — A9270 NON-COVERED ITEM OR SERVICE: HCPCS | Performed by: PHYSICAL MEDICINE & REHABILITATION

## 2020-01-05 PROCEDURE — 700102 HCHG RX REV CODE 250 W/ 637 OVERRIDE(OP): Performed by: HOSPITALIST

## 2020-01-05 PROCEDURE — A9270 NON-COVERED ITEM OR SERVICE: HCPCS | Performed by: HOSPITALIST

## 2020-01-05 PROCEDURE — 97112 NEUROMUSCULAR REEDUCATION: CPT

## 2020-01-05 PROCEDURE — 700102 HCHG RX REV CODE 250 W/ 637 OVERRIDE(OP)

## 2020-01-05 PROCEDURE — 99232 SBSQ HOSP IP/OBS MODERATE 35: CPT | Performed by: HOSPITALIST

## 2020-01-05 PROCEDURE — 700102 HCHG RX REV CODE 250 W/ 637 OVERRIDE(OP): Performed by: PHYSICAL MEDICINE & REHABILITATION

## 2020-01-05 PROCEDURE — 82962 GLUCOSE BLOOD TEST: CPT

## 2020-01-05 PROCEDURE — 97116 GAIT TRAINING THERAPY: CPT

## 2020-01-05 PROCEDURE — 97530 THERAPEUTIC ACTIVITIES: CPT

## 2020-01-05 PROCEDURE — 97110 THERAPEUTIC EXERCISES: CPT

## 2020-01-05 RX ORDER — MIDODRINE HYDROCHLORIDE 2.5 MG/1
2.5 TABLET ORAL 2 TIMES DAILY
Status: DISCONTINUED | OUTPATIENT
Start: 2020-01-05 | End: 2020-01-07

## 2020-01-05 RX ADMIN — MULTIPLE VITAMINS W/ MINERALS TAB 1 TABLET: TAB at 08:49

## 2020-01-05 RX ADMIN — GLIPIZIDE 10 MG: 5 TABLET ORAL at 08:49

## 2020-01-05 RX ADMIN — GLIPIZIDE 10 MG: 5 TABLET ORAL at 17:03

## 2020-01-05 RX ADMIN — INSULIN LISPRO 4 UNITS: 100 INJECTION, SOLUTION INTRAVENOUS; SUBCUTANEOUS at 17:03

## 2020-01-05 RX ADMIN — METFORMIN HYDROCHLORIDE 1000 MG: 500 TABLET ORAL at 08:48

## 2020-01-05 RX ADMIN — FERROUS SULFATE TAB 325 MG (65 MG ELEMENTAL FE) 325 MG: 325 (65 FE) TAB at 08:48

## 2020-01-05 RX ADMIN — INSULIN LISPRO 2 UNITS: 100 INJECTION, SOLUTION INTRAVENOUS; SUBCUTANEOUS at 20:48

## 2020-01-05 RX ADMIN — METFORMIN HYDROCHLORIDE 1000 MG: 500 TABLET ORAL at 17:03

## 2020-01-05 RX ADMIN — ASPIRIN 81 MG: 81 TABLET, COATED ORAL at 08:48

## 2020-01-05 RX ADMIN — INSULIN LISPRO 4 UNITS: 100 INJECTION, SOLUTION INTRAVENOUS; SUBCUTANEOUS at 11:09

## 2020-01-05 RX ADMIN — ATORVASTATIN CALCIUM 80 MG: 40 TABLET, FILM COATED ORAL at 19:30

## 2020-01-05 RX ADMIN — MIDODRINE HYDROCHLORIDE 2.5 MG: 2.5 TABLET ORAL at 13:50

## 2020-01-05 RX ADMIN — VITAMIN D, TAB 1000IU (100/BT) 1000 UNITS: 25 TAB at 08:48

## 2020-01-05 RX ADMIN — CLOPIDOGREL BISULFATE 75 MG: 75 TABLET ORAL at 19:30

## 2020-01-05 RX ADMIN — OXYCODONE HYDROCHLORIDE AND ACETAMINOPHEN 500 MG: 500 TABLET ORAL at 08:49

## 2020-01-05 RX ADMIN — OXYCODONE HYDROCHLORIDE AND ACETAMINOPHEN 500 MG: 500 TABLET ORAL at 19:30

## 2020-01-05 ASSESSMENT — ENCOUNTER SYMPTOMS
FEVER: 0
DIARRHEA: 0
DIZZINESS: 0
NERVOUS/ANXIOUS: 0
BLURRED VISION: 0
COUGH: 0

## 2020-01-05 ASSESSMENT — PATIENT HEALTH QUESTIONNAIRE - PHQ9
1. LITTLE INTEREST OR PLEASURE IN DOING THINGS: NOT AT ALL
2. FEELING DOWN, DEPRESSED, IRRITABLE, OR HOPELESS: NOT AT ALL
SUM OF ALL RESPONSES TO PHQ9 QUESTIONS 1 AND 2: 0

## 2020-01-05 NOTE — THERAPY
Occupational Therapy  Daily Treatment     Patient Name: Israel Aviles  Age:  54 y.o., Sex:  male  Medical Record #: [id number]  Today's Date: 1/5/2020     Precautions  Precautions: Non Weight Bearing Left Lower Extremity, Immobilizer Left Lower Extremity, Fall Risk, Other (See Comments)  Comments: Abdominal binder for Hypotension; MDRO Contact precautions; L BKA    Safety   ADL Safety : Requires Supervision for Safety  Bathroom Safety: Requires Supervision for Safety  Comments: Supervision due to variable BP and orthostatic hypotension     Subjective    Pt seated in bed upon arrival, pleasant and cooperative, agreeable to therapy - pt washed hands prior to leaving room     Objective       01/05/20 1431   Sitting Upper Body Exercises   Chest Fly 3 sets of 10;Bilateral;Weight (See Comments for lbs)  (15# equalizer)   Bilateral Row 3 sets of 10;Bilateral;Weight (See Comments for lbs)  (35# equalizer)   Interdisciplinary Plan of Care Collaboration   Patient Position at End of Therapy Seated;Call Light within Reach;Tray Table within Reach   OT Total Time Spent   OT Individual Total Time Spent (Mins) 30   OT Charge Group   OT Therapeutic Exercise  2       Assessment    Pt completed UB therex to the best of their abilities with no complaints of pain, no complaints of dizziness    Plan    Cont overall strength/endurance and standing balance to improve ADL's and functional mobility

## 2020-01-05 NOTE — PROGRESS NOTES
0715: Bedside report received, assumed care for this patient.  Patient is A&O x 4.  VSS.  Communication board updated, call light and belongings are within reach.  Bed is in low position. Patient appears in no distress, and has no complaints of SOB and 0/10 of pain.  Will be medicated per MAR.  Plan of care discussed and agreed upon with patient.  Did have a BM this AM.  Mildly annoyed that he got his FS at 845 and he waited to eat.  I informed patient that if we are tied up in the future to please eat and not wait FS.

## 2020-01-05 NOTE — PROGRESS NOTES
[facility] Medicine Daily Progress Note    Date of Service  1/5/2020    Chief Complaint:  Hypertension  Diabetes  Fluid overload    Interval History:  No significant events or changes since last visit    Review of Systems  Review of Systems   Constitutional: Negative for fever.   Eyes: Negative for blurred vision.   Respiratory: Negative for cough.    Cardiovascular: Negative for chest pain.   Gastrointestinal: Negative for diarrhea.   Musculoskeletal: Negative for joint pain.   Neurological: Negative for dizziness.   Psychiatric/Behavioral: The patient is not nervous/anxious.         Physical Exam  Temp:  [36.4 °C (97.6 °F)-36.8 °C (98.2 °F)] 36.4 °C (97.6 °F)  Pulse:  [] 99  Resp:  [16-20] 16  BP: ()/(56-79) 96/67  SpO2:  [95 %-99 %] 99 %    Physical Exam  Vitals signs and nursing note reviewed.   Constitutional:       Appearance: He is not diaphoretic.   HENT:      Mouth/Throat:      Pharynx: No posterior oropharyngeal erythema.   Eyes:      Extraocular Movements: Extraocular movements intact.   Neck:      Vascular: No carotid bruit.   Cardiovascular:      Rate and Rhythm: Normal rate and regular rhythm.   Pulmonary:      Effort: Pulmonary effort is normal.      Breath sounds: No wheezing or rales.   Abdominal:      General: There is no distension.      Palpations: Abdomen is soft.      Tenderness: There is no tenderness.   Musculoskeletal:      Comments: Has left BKA   Skin:     General: Skin is warm and dry.   Neurological:      Mental Status: He is alert and oriented to person, place, and time.   Psychiatric:         Mood and Affect: Mood normal.         Behavior: Behavior normal.         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 Zhao  S/P IV abx per Infectious Diseases    Tachycardia  Assessment & Plan  HR generally ok and mostly in to 90's  ? 2nd to dehydration -- see other assessment  On Aldactone: 12.5 mg daily (last dose 1/4)  Cont to monitor    Azotemia  Assessment & Plan  Bun: 19 (12/28) --> 25 (12/30) --> 31 (1/4)  Off Aldactone: 12.5 mg daily (last dose 1/4)  Encouraging fluid (water/juice) intake   Monitor    HTN (hypertension)  Assessment & Plan  BP low normal and occ dips lower  Has mild azotemia  Has mild anemia  TSH ok  Off Diovan (12/31 am)  Off Coreg (1/1)  Off Aldactone: 12.5 mg daily (last dose 1/4)  Orthostatics (1/4): positive on standing  Will start Midodrine: 2.5 mg bid (1/5)  Consider restarting Aldactone when BP better  Cont to monitor    CAD (coronary artery disease)  Assessment & Plan  S/P NSTEMI  Echo: EF 45%  BNP: 6008 (12/18) --> 2535 (12/30) --> 2906 (1/4)  Has 3 vessel disease per cardiac cath on 12/16/19 -- recommendations for medical management  Off Diovan & Coreg -- 2nd to low BP  Off Aldactone: 12.5 mg daily 2nd to lower BP (last dose 1/4)  On ASA  On Lipitor  Note: need out patient Cardiology follow up    Vitamin D deficiency  Assessment & Plan  Vit D: 20  On supplements    Normocytic anemia- (present on admission)  Assessment & Plan  H&H has been improving  On Fe supplements     Uncontrolled type 2 diabetes mellitus with hyperglycemia (HCC)- (present on admission)  Assessment & Plan  Hba1c: 8.5  BS still elevated: 180-324  Off Lantus  On Metformin: 1000 mg bid  On Glipizide: 2.5 mg bid (12/29) --> 5 mg qam & 2.5 mg qpm (1/2) --> 5 mg bid (1/3) --> 10 mg bid (1/4)  Not on any coverage -- will start today (1/5)  Consider starting Januvia  Note: home meds include Metformin 1000 mg bid and Glipizide 5 mg bid  Monitor another  day since meds were recently adjusted (1/5)    PVD (peripheral vascular disease) (HCC)  Assessment & Plan  Has hx of prior LLE revascularization  On ASA & Plavix  On Lipitor

## 2020-01-05 NOTE — THERAPY
Occupational Therapy  Daily Treatment     Patient Name: Israel Aviles  Age:  54 y.o., Sex:  male  Medical Record #: [id number]  Today's Date: 1/5/2020     Precautions  Precautions: Non Weight Bearing Left Lower Extremity, Immobilizer Left Lower Extremity, Fall Risk, Other (See Comments)  Comments: Abdominal binder for Hypotension; MDRO Contact precautions; L BKA    Safety   ADL Safety : Requires Supervision for Safety  Bathroom Safety: Requires Supervision for Safety  Comments: Supervision due to variable BP and orthostatic hypotension     Subjective    Pt seated in bed upon arrival completing lunch that spouse brought in, pleasant and cooperative, agreeable to therapy      Objective       01/05/20 1301   Vitals   Pulse (!) 107   Patient BP Position Sitting   Blood Pressure 107/59   Vitals Comments Abdominal binder not on. No reports of dizziness   Sitting Upper Body Exercises   Lat Press 3 sets of 10;Bilateral;Weight (See Comments for lbs)  (35# rickshaw)   Upper Extremity Bike Level 7 Resistance  (BUE Motomed 6 min, 1 RB, 1.03 mile)   Interdisciplinary Plan of Care Collaboration   Patient Position at End of Therapy Seated;Call Light within Reach;Tray Table within Reach   Therapy Missed   Missed Therapy (Minutes) 30   Reason For Missed Therapy Non-Medical - Other (Please Comment)  (pt eating lunch in room)   OT Total Time Spent   OT Individual Total Time Spent (Mins) 30   OT Charge Group   OT Therapeutic Exercise  2       Assessment    Pt completed UB therex to the best of their abilities with no complaints of pain    Plan    Cont overall strength/endurance and standing balance to improve ADL's and functional mobility

## 2020-01-05 NOTE — THERAPY
Physical Therapy   Daily Treatment     Patient Name: sIrael Aviles  Age:  54 y.o., Sex:  male  Medical Record #: [id number]  Today's Date: 1/5/2020     Precautions  Precautions: (P) Non Weight Bearing Left Lower Extremity, Immobilizer Left Lower Extremity, Fall Risk, Other (See Comments)  Comments: (P) Abdominal binder for Hypotension; MDRO Contact precautions; L BKA    Subjective    Patient reported minimal to no dizziness during session. Spouse is agreeable to car transfer training tomorrow.      Objective       01/05/20 0931   Precautions   Precautions Non Weight Bearing Left Lower Extremity;Immobilizer Left Lower Extremity;Fall Risk;Other (See Comments)   Comments Abdominal binder for Hypotension; MDRO Contact precautions; L BKA   Vitals   Pulse 95   Patient BP Position Sitting   Blood Pressure (!) 98/66   Bed Mobility    Sit to Supine Modified Independent   Sit to Stand Stand by Assist   Scooting Modified Independent   Rolling Modified Independent   Neuro-Muscular Treatments   Comments Floor <> EOM recovery practice, while maintaining LLE NWB CGA-SBA.  Handout provided and reviewed.   Interdisciplinary Plan of Care Collaboration   IDT Collaboration with  Certified Nursing Assistant;Nursing   Patient Position at End of Therapy Seated   Collaboration Comments POC   PT Total Time Spent   PT Individual Total Time Spent (Mins) 60   PT Charge Group   PT Gait Training 2   PT Neuromuscular Re-Education / Balance 1   PT Therapeutic Activities 1     Ace wrapping to residual limb with 2 wraps, figure 8 method, and patellar lock. ROM appears intact with use of IPOP.     FIM Bed/Chair/Wheelchair Transfers Score: 5 - Standby Prompting/Supervision or Set-up  Bed/Chair/Wheelchair Transfers Description:  Adaptive equipment(SBA SPT with FWW, vs mod I wc level reach pivot)    FIM Walking Score:  2 - Max Assistance  Walking Description:  Walker, Extra time, Safety concerns( ft x 4, no wc follow, indoors monitoring  vitals)    FIM Wheelchair Score:  6 - Modified Independent  Wheelchair Description:  (indoors)      Assessment    Patient demonstrated good safety awareness.  Patient progressed to floor recovery training.     Plan    Family training with spouse tomorrow for car transfer in patient's vehicle. Ongoing vitals monitoring. Progress household safety with FWW, and transfers.  Progress to mod I in room form wc level for transfers.

## 2020-01-06 PROBLEM — I95.1 ORTHOSTATIC HYPOTENSION: Status: ACTIVE | Noted: 2020-01-06

## 2020-01-06 LAB
GLUCOSE BLD-MCNC: 160 MG/DL (ref 65–99)
GLUCOSE BLD-MCNC: 166 MG/DL (ref 65–99)
GLUCOSE BLD-MCNC: 182 MG/DL (ref 65–99)
GLUCOSE BLD-MCNC: 244 MG/DL (ref 65–99)

## 2020-01-06 PROCEDURE — 770010 HCHG ROOM/CARE - REHAB SEMI PRIVAT*

## 2020-01-06 PROCEDURE — 99232 SBSQ HOSP IP/OBS MODERATE 35: CPT | Performed by: HOSPITALIST

## 2020-01-06 PROCEDURE — 700102 HCHG RX REV CODE 250 W/ 637 OVERRIDE(OP): Performed by: PHYSICAL MEDICINE & REHABILITATION

## 2020-01-06 PROCEDURE — A9270 NON-COVERED ITEM OR SERVICE: HCPCS | Performed by: HOSPITALIST

## 2020-01-06 PROCEDURE — 99232 SBSQ HOSP IP/OBS MODERATE 35: CPT | Performed by: PHYSICAL MEDICINE & REHABILITATION

## 2020-01-06 PROCEDURE — 82962 GLUCOSE BLOOD TEST: CPT | Mod: 91

## 2020-01-06 PROCEDURE — 97530 THERAPEUTIC ACTIVITIES: CPT

## 2020-01-06 PROCEDURE — A9270 NON-COVERED ITEM OR SERVICE: HCPCS | Performed by: PHYSICAL MEDICINE & REHABILITATION

## 2020-01-06 PROCEDURE — 700102 HCHG RX REV CODE 250 W/ 637 OVERRIDE(OP): Performed by: HOSPITALIST

## 2020-01-06 PROCEDURE — 97110 THERAPEUTIC EXERCISES: CPT

## 2020-01-06 PROCEDURE — 97112 NEUROMUSCULAR REEDUCATION: CPT

## 2020-01-06 PROCEDURE — 97116 GAIT TRAINING THERAPY: CPT

## 2020-01-06 PROCEDURE — 97535 SELF CARE MNGMENT TRAINING: CPT

## 2020-01-06 RX ADMIN — ASPIRIN 81 MG: 81 TABLET, COATED ORAL at 08:08

## 2020-01-06 RX ADMIN — CLOPIDOGREL BISULFATE 75 MG: 75 TABLET ORAL at 21:02

## 2020-01-06 RX ADMIN — INSULIN LISPRO 2 UNITS: 100 INJECTION, SOLUTION INTRAVENOUS; SUBCUTANEOUS at 11:04

## 2020-01-06 RX ADMIN — ATORVASTATIN CALCIUM 80 MG: 40 TABLET, FILM COATED ORAL at 21:02

## 2020-01-06 RX ADMIN — MIDODRINE HYDROCHLORIDE 2.5 MG: 2.5 TABLET ORAL at 14:15

## 2020-01-06 RX ADMIN — OXYCODONE HYDROCHLORIDE AND ACETAMINOPHEN 500 MG: 500 TABLET ORAL at 21:02

## 2020-01-06 RX ADMIN — VITAMIN D, TAB 1000IU (100/BT) 1000 UNITS: 25 TAB at 08:08

## 2020-01-06 RX ADMIN — MULTIPLE VITAMINS W/ MINERALS TAB 1 TABLET: TAB at 08:09

## 2020-01-06 RX ADMIN — MIDODRINE HYDROCHLORIDE 2.5 MG: 2.5 TABLET ORAL at 06:52

## 2020-01-06 RX ADMIN — FERROUS SULFATE TAB 325 MG (65 MG ELEMENTAL FE) 325 MG: 325 (65 FE) TAB at 08:08

## 2020-01-06 RX ADMIN — INSULIN LISPRO 2 UNITS: 100 INJECTION, SOLUTION INTRAVENOUS; SUBCUTANEOUS at 08:02

## 2020-01-06 RX ADMIN — OXYCODONE HYDROCHLORIDE AND ACETAMINOPHEN 500 MG: 500 TABLET ORAL at 08:08

## 2020-01-06 RX ADMIN — METFORMIN HYDROCHLORIDE 1000 MG: 500 TABLET ORAL at 08:08

## 2020-01-06 RX ADMIN — INSULIN LISPRO 2 UNITS: 100 INJECTION, SOLUTION INTRAVENOUS; SUBCUTANEOUS at 17:08

## 2020-01-06 RX ADMIN — GLIPIZIDE 10 MG: 5 TABLET ORAL at 08:09

## 2020-01-06 RX ADMIN — GLIPIZIDE 10 MG: 5 TABLET ORAL at 17:08

## 2020-01-06 RX ADMIN — SITAGLIPTIN 50 MG: 50 TABLET, FILM COATED ORAL at 14:15

## 2020-01-06 RX ADMIN — INSULIN LISPRO 4 UNITS: 100 INJECTION, SOLUTION INTRAVENOUS; SUBCUTANEOUS at 21:03

## 2020-01-06 RX ADMIN — METFORMIN HYDROCHLORIDE 1000 MG: 500 TABLET ORAL at 17:08

## 2020-01-06 ASSESSMENT — PATIENT HEALTH QUESTIONNAIRE - PHQ9
2. FEELING DOWN, DEPRESSED, IRRITABLE, OR HOPELESS: NOT AT ALL
SUM OF ALL RESPONSES TO PHQ9 QUESTIONS 1 AND 2: 0
SUM OF ALL RESPONSES TO PHQ9 QUESTIONS 1 AND 2: 0
2. FEELING DOWN, DEPRESSED, IRRITABLE, OR HOPELESS: NOT AT ALL
1. LITTLE INTEREST OR PLEASURE IN DOING THINGS: NOT AT ALL
1. LITTLE INTEREST OR PLEASURE IN DOING THINGS: NOT AT ALL

## 2020-01-06 ASSESSMENT — ENCOUNTER SYMPTOMS
ABDOMINAL PAIN: 0
SHORTNESS OF BREATH: 0
CHILLS: 0
NAUSEA: 0
FEVER: 0
DIARRHEA: 0
VOMITING: 0
NERVOUS/ANXIOUS: 0

## 2020-01-06 NOTE — THERAPY
"Occupational Therapy  Daily Treatment     Patient Name: Israel Aviles  Age:  54 y.o., Sex:  male  Medical Record #: [id number]  Today's Date: 1/6/2020     Precautions  Precautions: Non Weight Bearing Left Lower Extremity, Fall Risk, Other (See Comments)  Comments: Low BP, IPOP LLE, MDRO contact precautions , abdominal binder for hypotension    Safety   ADL Safety : Modified Independent  Bathroom Safety: Requires Supervision for Safety  Comments: Supervision due to variable BP and orthostatic hypotension     Subjective    Pt was seated in bed and agreeable to therapy. \"I'd like to shower today.\"      Objective       01/06/20 0931   Precautions   Precautions Non Weight Bearing Left Lower Extremity;Fall Risk;Other (See Comments)   Comments Low BP, IPOP LLE, MDRO contact precautions , abdominal binder for hypotension   Safety    ADL Safety  Modified Independent   Bathroom Safety Requires Supervision for Safety   Vitals   Patient BP Position Sitting   Blood Pressure 102/64   O2 Delivery None (Room Air)   Vitals Comments abdominal binder not on due to showering. 95/65 after shower   Pain 0 - 10 Group   Pain Rating Scale (NPRS) 0   Cognition    Level of Consciousness Alert   IADL Treatments   Home Management Pt able to gather clothing for laundry after shower    Comments Pt reports his son installed grab bars in the bathroom and his wife is purchasing a TTB   Balance   Sitting Balance (Static) Normal   Sitting Balance (Dynamic) Good   Standing Balance (Static) Fair -   Standing Balance (Dynamic) Poor +   Weight Shift Sitting Good   Bed Mobility    Supine to Sit Modified Independent   Scooting Modified Independent   Interdisciplinary Plan of Care Collaboration   IDT Collaboration with  Nursing   Patient Position at End of Therapy Seated;Self Releasing Lap Belt Applied;Call Light within Reach;Tray Table within Reach  (Prosthetist  present )   Collaboration Comments dressing   OT Total Time Spent   OT Individual Total " Time Spent (Mins) 60   OT Charge Group   OT Self Care / ADL 4     FIM Eating Score:  7 - Independent  Eating Description:       FIM Grooming Score:  6 - Modified Independent  Grooming Description:  Increased time    FIM Bathing Score:  6 - Modified Independent  Bathing Description:       FIM Upper Body Dressin - Independent  Upper Body Dressing Description:       FIM Lower Body Dressing Score:  6 - Modified Independent  Lower Body Dressing Description:  Increased time    FIM Bed/Chair/Wheelchair Transfers Score: 6 - Modified Independent  Bed/Chair/Wheelchair Transfers Description:       FIM Tub/Shower Transfers Score:  5 - Standby Prompting/Supervision or Set-up  Tub/Shower Transfers Description:  Supervision for safety, Verbal cueing(Verbal cues to use grab bar )      Assessment    Pt participated in showering, dressing and grooming with increased independence, able to doff/don his IPOP and assist with wrapping his residual limb prior to shower. Pt demonstrates increased endurance and activity tolerance as well as higher BP with movement. Pt required verbal cues to use grab bar during shower transfer. Pt barriers include NWB LLE, orthostatic hypotension, and limited endurance.     Plan     Continue OT to address ADL/IADL independence, endurance, functional transfers, and BUE strength. Progress to Mod I in room from w/c pending stable BP.

## 2020-01-06 NOTE — THERAPY
Physical Therapy   Daily Treatment     Patient Name: Israel Aviles  Age:  54 y.o., Sex:  male  Medical Record #: [id number]  Today's Date: 1/6/2020     Precautions  Precautions: Non Weight Bearing Left Lower Extremity, Fall Risk, Other (See Comments)  Comments: Low BP, IPOP LLE, MDRO contact precautions , abdominal binder for hypotension    Subjective    Pt resting in bed, willing to participate.      Objective       01/06/20 1331   Supine Lower Body Exercise   Bridges Two Legged;3 sets of 15  (modified  BKA position with half roll bolster)   Hip Abduction Side Lying;3 sets of 15;Left   Hip Adduction  3 sets of 15  (SONYA squeezes)   Straight Leg Raises 3 sets of 15   Other Exercises quad sets 3 x 15 LLE/ glut sets 3 x 15 B. Prone lying hamstring curls/ hip ext 3 x 15 B. Manual cues for full ROM.    PT Total Time Spent   PT Individual Total Time Spent (Mins) 60   PT Charge Group   PT Gait Training 1   PT Therapeutic Exercise 3       Gait/ endurance training with SBA / CGA 50 ft x 2    Assessment    Pt reports fatigue and mild difficulty with BKA exercise program. Pleasant and cooperative throughout PT    Plan    Review curb mobility with FWW, ongoing safety with FWW, monitor vitals and dizziness, D/C anticipated for 3 days, car transfer training.

## 2020-01-06 NOTE — PROGRESS NOTES
0715: Bedside report received, assumed care for this patient.  Patient is A&O x 4.  VSS.  Communication board updated, call light and belongings are within reach.  Bed is in low position. Patient appears in no distress, and has no complaints of SOB and 0/10 of pain.  Will be medicated per MAR.  Plan of care discussed and agreed upon with patient.

## 2020-01-06 NOTE — REHAB-OT IDT TEAM NOTE
Occupational Therapy   Activities of Daily Living  Eating Initial:  5 - Standby Prompting/Supervision or Set-up  Eating Current:  7 - Independent   Eating Description:     Grooming Initial:  5 - Standby Prompting/Supervision or Set-up  Grooming Current:  6 - Modified Independent   Grooming Description:  Increased time  Bathing Initial:  4 - Minimal Assistance  Bathing Current:  6 - Modified Independent   Bathing Description:  Grab bar, Tub bench, Hand held shower, Increased time  Upper Body Dressing Initial:  5 - Standby Prompting/Supervision or Set-up  Upper Body Dressing Current:  7 - Independent   Upper Body Dressing Description:  ( tank top and long sleeve shirt)  Lower Body Dressing Initial:  4 - Minimal Assistance  Lower Body Dressing Current:  6 - Modified Independent   Lower Body Dressing Description:  6 - Modified Independent  Toileting Initial:  2 - Max Assistance  Toileting Current:  6 - Modified Independent   Toileting Description:  Grab bar, Increased time  Toilet Transfer Initial:  4 - Minimal Assistance  Toilet Transfer Current:  5 - Standby Prompting/Supervision or Set-up   Toilet Transfer Description:  5 - Standby Prompting/Supervision or Set-up  Tub / Shower Transfer Initial:  4 - Minimal Assistance  Tub / Shower Transfer Current:  5 - Standby Prompting/Supervision or Set-up   Tub / Shower Transfer Description:  Supervision for safety, Verbal cueing(Verbal cues to use grab bar )  IADL:  Therapy has concentrated on ADLs due to precautions/BP   Family Training/Education:  Pt's wife has been trained on ADLs and functional transfers   DME/DC Recommendations:  Home Health OT      Strengths:  Able to follow instructions, Alert and oriented, Effective communication skills, Good balance, Good carryover of learning, Good insight into deficits/needs, Independent PLOF, Making steady progress towards goals, Manages pain appropriately, Motivated for self care and independence, Pleasant and cooperative,  Supportive family, Willingly participates in therapeutic activities and Other: Former olympic athlete   Barriers:  Decreased endurance, Orthostatic hypotension and Pressure ulcer     # of short term goals set= 4    # of short term goals met= 4, 2 goals revised to Mod I     Occupational Therapy Goals     Problem: Bathing     Dates: Start: 12/28/19       Description:     Goal: STG-Within one week, patient will bathe     Dates: Start: 12/28/19   Expected End: 01/04/20       Description: 1) Individualized Goal:  Mod I using AE/DME PRN   2) Interventions:  OT E Stim Attended, OT Group Therapy, OT Self Care/ADL, OT Cognitive Skill Dev, OT Community Reintegration, OT Manual Ther Technique, OT Neuro Re-Ed/Balance, OT Sensory Int Techniques, OT Therapeutic Activity, OT Evaluation and OT Therapeutic Exercise         Note:     Goal Note filed on 12/31/19 1135 by Savana Tobias MS,OTR/L    Requires SBA - Min A                        Problem: Dressing     Dates: Start: 12/28/19       Description:     Goal: STG-Within one week, patient will dress LB     Dates: Start: 12/28/19   Expected End: 01/04/20       Description: 1) Individualized Goal:  With Mod I using AE/DME PRN   2) Interventions:  OT E Stim Attended, OT Group Therapy, OT Self Care/ADL, OT Cognitive Skill Dev, OT Community Reintegration, OT Manual Ther Technique, OT Neuro Re-Ed/Balance, OT Sensory Int Techniques, OT Therapeutic Activity, OT Evaluation and OT Therapeutic Exercise         Note:     Goal Note filed on 12/31/19 1135 by Savana Tobias MS,OTR/L    Requires SBA - Min A                        Problem: Functional Transfers     Dates: Start: 12/28/19       Description:     Goal: STG-Within one week, patient will transfer to toilet     Dates: Start: 12/28/19   Expected End: 01/04/20       Description: 1) Individualized Goal:  With SBA consistently with no cues for set-up/safety  2) Interventions:  OT E Stim Attended, OT Group Therapy, OT Self Care/ADL,  OT Cognitive Skill Dev, OT Community Reintegration, OT Manual Ther Technique, OT Neuro Re-Ed/Balance, OT Sensory Int Techniques, OT Therapeutic Activity, OT Evaluation and OT Therapeutic Exercise        Note:     Goal Note filed on 12/31/19 1135 by Savana Tobias MS,OTR/L    Requires SBA - Min A                        Problem: OT Long Term Goals     Dates: Start: 12/28/19       Description:     Goal: LTG-By discharge, patient will complete basic self care tasks     Dates: Start: 12/28/19   Expected End: 01/04/20       Description: 1) Individualized Goal:  With mod I  2) Interventions:  OT E Stim Attended, OT Group Therapy, OT Self Care/ADL, OT Cognitive Skill Dev, OT Community Reintegration, OT Manual Ther Technique, OT Neuro Re-Ed/Balance, OT Sensory Int Techniques, OT Therapeutic Activity, OT Evaluation and OT Therapeutic Exercise                 Problem: Toileting     Dates: Start: 12/28/19       Description:     Goal: STG-Within one week, patient will complete toileting tasks     Dates: Start: 12/28/19   Expected End: 01/04/20       Description: 1) Individualized Goal:  With SBA consistently with no cues for set-up/safety  2) Interventions:  OT E Stim Attended, OT Group Therapy, OT Self Care/ADL, OT Cognitive Skill Dev, OT Community Reintegration, OT Manual Ther Technique, OT Neuro Re-Ed/Balance, OT Sensory Int Techniques, OT Therapeutic Activity, OT Evaluation and OT Therapeutic Exercise        Note:     Goal Note filed on 12/31/19 1135 by Savana Tobias MS,OTR/L    Requires SBA - Min A                              Section completed by:  SRIRAM Pete, OTR/L

## 2020-01-06 NOTE — CARE PLAN
Problem: Communication  Goal: The ability to communicate needs accurately and effectively will improve  Outcome: PROGRESSING AS EXPECTED  Note:   Pt is able to communicate his needs effectively to staff.      Problem: Safety  Goal: Will remain free from injury  Note:   Pt uses call light consistently for staff assistance. Pt has good safety awareness, no impulsivity observed.      Problem: Bowel/Gastric:  Goal: Normal bowel function is maintained or improved  Outcome: PROGRESSING AS EXPECTED  Note:   Pt is continent of bowel and reports having daily BMs

## 2020-01-06 NOTE — THERAPY
Physical Therapy   Daily Treatment     Patient Name: Israel Aviles  Age:  54 y.o., Sex:  male  Medical Record #: [id number]  Today's Date: 1/6/2020     Precautions  Precautions: (P) Non Weight Bearing Left Lower Extremity, Fall Risk, Other (See Comments)  Comments: (P) Low BP, IPOP LLE, MDRO contact precautions , abdominal binder for hypotension    Subjective    Patient reported that dizziness improves during PT tx, as BP normalized.      Objective       01/06/20 1031   Precautions   Precautions Non Weight Bearing Left Lower Extremity;Fall Risk;Other (See Comments)   Comments Low BP, IPOP LLE, MDRO contact precautions , abdominal binder for hypotension   Vitals   Pulse 100   Blood Pressure (!) 92/55  (before therapy/ gait)   Sitting Lower Body Exercises   Long Arc Quad 2 sets of 15;Right   Other Exercises Quad sets 15 reps RLE   Bed Mobility    Supine to Sit Modified Independent   Sit to Supine Modified Independent   Sit to Stand Stand by Assist  (FWW)   Scooting Modified Independent   Rolling Modified Independent   Neuro-Muscular Treatments   Neuro-Muscular Treatments Weight Shift Right;Weight Shift Left;Verbal Cuing;Tactile Cuing;Sequencing   Comments In room assessment from wc level for bed and toilet transfers mod I. Ace wrapping to residual limb with 2 wraps, figure 8 method, and patellar lock.    Interdisciplinary Plan of Care Collaboration   IDT Collaboration with  Nursing;Physician   Patient Position at End of Therapy Seated  (handoff to hospitalist)   Collaboration Comments Meds, POC, BP, infection control to interview patient over the phone after tx, CLOF, mod I in room from wc level   PT Total Time Spent   PT Individual Total Time Spent (Mins) 60   PT Charge Group   PT Gait Training 2   PT Neuromuscular Re-Education / Balance 1   PT Therapeutic Activities 1       FIM Bed/Chair/Wheelchair Transfers Score: 6 - Modified Independent  Bed/Chair/Wheelchair Transfers Description:  Adaptive equipment(Mod I  reach pivot wc <> bed)    FIM Walking Score:  2 - Max Assistance  Walking Description:  Requires incidental assist(CGA/SBA FWW indoors 80 ft x 2 + 125 ft )      Assessment    Patient's BP was monitored after each walk, and it inc to 117/77 after therapy.  Dizziness diminished as BP normalized. Patient progressed to mod I in room from wc level for bed and toilet transfers, and SBA with FWW, once BP stabilized.     Plan    Review curb mobility with FWW, ongoing safety with FWW, monitor vitals and dizziness, D/C anticipated for 3 days, car transfer training.

## 2020-01-06 NOTE — PROGRESS NOTES
Rehab Progress Note     Encounter Date: 1/6/2020    CC: CARLY BKA, weakness, DM    Interval Events (Subjective)  Patient sitting up in wheelchair. Patient had IPOP fitted and benefits from it. He reports blood sugars have been controlled as well as blood pressure. He reports he did have low BP on Saturday morning but was able to perform therapy. Per charting he was started on Midodrine. Notified by nursing that he will have a discussion with the health department this morning.     IDT Team Meeting 12/31/2019  DC/Disposition:  1/9/20    Objective:  VITAL SIGNS: /77 Comment: after therapy/ gait  Pulse 100   Temp 36.6 °C (97.8 °F) (Oral)   Resp 15   Ht 1.829 m (6')   Wt 70.5 kg (155 lb 6.8 oz)   SpO2 100%   BMI 21.08 kg/m²   Gen: NAD  Psych: Mood and affect appropriate  CV: RRR, no edema  Resp: CTAB, no upper airway sounds  Abd: NTND  Neuro: AOx4, hopping with FWW    Recent Results (from the past 72 hour(s))   ACCU-CHEK GLUCOSE    Collection Time: 01/03/20  5:22 PM   Result Value Ref Range    Glucose - Accu-Ck 324 (H) 65 - 99 mg/dL   ACCU-CHEK GLUCOSE    Collection Time: 01/03/20  9:23 PM   Result Value Ref Range    Glucose - Accu-Ck 279 (H) 65 - 99 mg/dL   Basic Metabolic Panel    Collection Time: 01/04/20  5:59 AM   Result Value Ref Range    Sodium 133 (L) 135 - 145 mmol/L    Potassium 4.7 3.6 - 5.5 mmol/L    Chloride 97 96 - 112 mmol/L    Co2 26 20 - 33 mmol/L    Glucose 221 (H) 65 - 99 mg/dL    Bun 31 (H) 8 - 22 mg/dL    Creatinine 0.69 0.50 - 1.40 mg/dL    Calcium 9.3 8.5 - 10.5 mg/dL    Anion Gap 10.0 0.0 - 11.9   MAGNESIUM    Collection Time: 01/04/20  5:59 AM   Result Value Ref Range    Magnesium 1.7 1.5 - 2.5 mg/dL   PHOSPHORUS    Collection Time: 01/04/20  5:59 AM   Result Value Ref Range    Phosphorus 3.4 2.5 - 4.5 mg/dL   proBrain Natriuretic Peptide, NT    Collection Time: 01/04/20  5:59 AM   Result Value Ref Range    NT-proBNP 2906 (H) 0 - 125 pg/mL   ESTIMATED GFR    Collection Time: 01/04/20   5:59 AM   Result Value Ref Range    GFR If African American >60 >60 mL/min/1.73 m 2    GFR If Non African American >60 >60 mL/min/1.73 m 2   ACCU-CHEK GLUCOSE    Collection Time: 01/04/20  7:56 AM   Result Value Ref Range    Glucose - Accu-Ck 184 (H) 65 - 99 mg/dL   ACCU-CHEK GLUCOSE    Collection Time: 01/04/20 11:26 AM   Result Value Ref Range    Glucose - Accu-Ck 180 (H) 65 - 99 mg/dL   ACCU-CHEK GLUCOSE    Collection Time: 01/04/20  5:19 PM   Result Value Ref Range    Glucose - Accu-Ck 186 (H) 65 - 99 mg/dL   ACCU-CHEK GLUCOSE    Collection Time: 01/04/20  9:36 PM   Result Value Ref Range    Glucose - Accu-Ck 269 (H) 65 - 99 mg/dL   ACCU-CHEK GLUCOSE    Collection Time: 01/05/20  8:47 AM   Result Value Ref Range    Glucose - Accu-Ck 175 (H) 65 - 99 mg/dL   ACCU-CHEK GLUCOSE    Collection Time: 01/05/20 11:02 AM   Result Value Ref Range    Glucose - Accu-Ck 215 (H) 65 - 99 mg/dL   ACCU-CHEK GLUCOSE    Collection Time: 01/05/20  5:02 PM   Result Value Ref Range    Glucose - Accu-Ck 216 (H) 65 - 99 mg/dL   ACCU-CHEK GLUCOSE    Collection Time: 01/05/20  8:47 PM   Result Value Ref Range    Glucose - Accu-Ck 195 (H) 65 - 99 mg/dL   ACCU-CHEK GLUCOSE    Collection Time: 01/06/20  8:02 AM   Result Value Ref Range    Glucose - Accu-Ck 166 (H) 65 - 99 mg/dL   ACCU-CHEK GLUCOSE    Collection Time: 01/06/20 11:03 AM   Result Value Ref Range    Glucose - Accu-Ck 160 (H) 65 - 99 mg/dL       Current Facility-Administered Medications   Medication Frequency   • SITagliptin (JANUVIA) tablet 50 mg DAILY   • insulin lispro (HUMALOG) injection 2-12 Units 4X/DAY ACHS   • midodrine (PROAMATINE) tablet 2.5 mg BID   • glipiZIDE (GLUCOTROL) tablet 10 mg BID AC   • senna-docusate (PERICOLACE or SENOKOT S) 8.6-50 MG per tablet 2 Tab BID PRN    And   • polyethylene glycol/lytes (MIRALAX) PACKET 1 Packet QDAY PRN    And   • magnesium hydroxide (MILK OF MAGNESIA) suspension 30 mL QDAY PRN    And   • bisacodyl (DULCOLAX) suppository 10 mg  QDAY PRN   • therapeutic multivitamin-minerals (THERAGRAN-M) tablet 1 Tab DAILY   • ascorbic acid tablet 500 mg BID   • glucose 4 g chewable tablet 16 g Q15 MIN PRN    And   • dextrose 50% (D50W) injection 50 mL Q15 MIN PRN   • vitamin D (cholecalciferol) 1000 UNIT tablet 1,000 Units DAILY   • Respiratory Care per Protocol Continuous RT   • hydrALAZINE (APRESOLINE) tablet 25 mg Q8HRS PRN   • acetaminophen (TYLENOL) tablet 650 mg Q4HRS PRN   • artificial tears ophthalmic solution 1 Drop PRN   • benzocaine-menthol (CEPACOL) lozenge 1 Lozenge Q2HRS PRN   • mag hydrox-al hydrox-simeth (MAALOX PLUS ES or MYLANTA DS) suspension 20 mL Q2HRS PRN   • ondansetron (ZOFRAN ODT) dispertab 4 mg 4X/DAY PRN    Or   • ondansetron (ZOFRAN) syringe/vial injection 4 mg 4X/DAY PRN   • traZODone (DESYREL) tablet 50 mg QHS PRN   • sodium chloride (OCEAN) 0.65 % nasal spray 2 Spray PRN   • aspirin EC (ECOTRIN) tablet 81 mg DAILY   • atorvastatin (LIPITOR) tablet 80 mg Nightly   • clopidogrel (PLAVIX) tablet 75 mg QHS   • ferrous sulfate tablet 325 mg QDAY with Breakfast   • metFORMIN (GLUCOPHAGE) tablet 1,000 mg BID WITH MEALS   • oxyCODONE-acetaminophen (PERCOCET) 5-325 MG per tablet 1-2 Tab Q4HRS PRN       Orders Placed This Encounter   Procedures   • Diet Order Diabetic     Standing Status:   Standing     Number of Occurrences:   1     Order Specific Question:   Diet:     Answer:   Diabetic [3]     Order Specific Question:   Miscellaneous modifications:     Answer:   Vegetarian [13]       Assessment:  Active Hospital Problems    Diagnosis   • *S/P BKA (below knee amputation) unilateral, left (HCC)   • PVD (peripheral vascular disease) ([facility])   • Vitamin D deficiency   • CAD (coronary artery disease)   • HTN (hypertension)   • Normocytic anemia   • Diabetic polyneuropathy associated with type 2 diabetes mellitus (HCC)   • Hyperlipidemia   • Uncontrolled type 2 diabetes mellitus with hyperglycemia ([facility])       Medical Decision Making and  Plan:  L BKA - Patient with 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 Zhao on 12/20/19.  -PT and OT for mobility and ADLs  -NWB LLE. IPOP to LLE     Neuropathic pain - Patient would prefer no medications. PT working on desensitization and mirror therapy.     DM - Patient on Glipizide 5 mg qAM, Metformin 1000 mg BID and fingersticks   -Consult Hospitalist. Started on Lantus. Restarted on home Januvia     HTN/CV/PAD - Patient on ASA 81 mg and Plavix 75 QPM. Patient with recent TTE with EF of 40%. Patient on Coreg 6.25 mg, Spironolactone 25 mg daily, and Valsartan 40 mg BID  -Ongoing Hypotension, discussed with hospitalist. Decrease Spironolactone 12.5 mg daily. Discontinue Valsartan. Coreg discontinued, now with tachycardia   -Start Abdominal binder. Discontinue Spironolactone. Started on Midodrine 2.5 mg      HLD - Patient on Atorvastatin 80 mg QHS     Anemia - Patient on Fe supplement on transfer. Check AM CBC - 10 on admission.      Vitamin D - deficient on admission. Start 1000 U     DVT Ppx - Patient on Heparin on transfer. Hopping > 100 feet, discontinue Heparin.      Total time:  26 minutes.  I spent greater than 50% of the time for patient care, counseling, and coordination on this date, including unit/floor time, and face-to-face time with the patient as per interval events and assessment and plan above. Topics discussed included ongoing low SBP, discussed with hospitalist, off all HTN medications and now on Midodrine 2.5 mg. Continue to monitor.     Oliverio Bai M.D.

## 2020-01-06 NOTE — PROGRESS NOTES
[facility] Medicine Daily Progress Note    Date of Service  1/6/2020    Chief Complaint:  Hypertension  Diabetes  Fluid overload    Interval History:  No significant events or changes since last visit    Review of Systems  Review of Systems   Constitutional: Negative for chills and fever.   Respiratory: Negative for shortness of breath.    Cardiovascular: Negative for chest pain.   Gastrointestinal: Negative for abdominal pain, diarrhea, nausea and vomiting.   Psychiatric/Behavioral: The patient is not nervous/anxious.         Physical Exam  Temp:  [36.4 °C (97.6 °F)-36.7 °C (98 °F)] 36.6 °C (97.8 °F)  Pulse:  [] 94  Resp:  [15-18] 15  BP: (102-110)/(59-77) 103/72  SpO2:  [100 %] 100 %    Physical Exam  Vitals signs and nursing note reviewed.   Constitutional:       Appearance: Normal appearance.   HENT:      Head: Atraumatic.   Eyes:      Conjunctiva/sclera: Conjunctivae normal.      Pupils: Pupils are equal, round, and reactive to light.   Neck:      Musculoskeletal: Normal range of motion and neck supple.   Cardiovascular:      Rate and Rhythm: Normal rate and regular rhythm.      Heart sounds: No murmur.   Pulmonary:      Effort: Pulmonary effort is normal.      Breath sounds: No stridor. No wheezing or rales.   Abdominal:      General: There is no distension.      Palpations: Abdomen is soft.      Tenderness: There is no tenderness.   Musculoskeletal:      Comments: Has left BKA   Skin:     General: Skin is warm and dry.      Findings: No rash.   Neurological:      Mental Status: He is alert and oriented to person, place, and time.   Psychiatric:         Mood and Affect: Mood normal.         Behavior: Behavior normal.         Fluids    Intake/Output Summary (Last 24 hours) at 1/6/2020 1022  Last data filed at 1/6/2020 0652  Gross per 24 hour   Intake 240 ml   Output --   Net 240 ml       Laboratory      Recent Labs     01/04/20  0559   SODIUM 133*   POTASSIUM 4.7   CHLORIDE 97   CO2 26   GLUCOSE 221*   BUN  31*   CREATININE 0.69   CALCIUM 9.3                   Assessment/Plan  * S/P BKA (below knee amputation) unilateral, left (HCC)  Assessment & Plan  2nd to left foot osteomyelitis  S/P BKA on 12/20/19 by Dr. Zhao  S/P IV abx per Infectious Diseases    Orthostatic hypotension  Assessment & Plan  BP better  Orthostatics (1/4): positive on standing  On Midodrine: 2.5 mg bid (1/5)    Tachycardia  Assessment & Plan  HR   ? 2nd to dehydration -- see other assessment  Off Aldactone: 12.5 mg daily (last dose 1/4)  Cont to monitor    Azotemia  Assessment & Plan  Bun: 19 (12/28) --> 25 (12/30) --> 31 (1/4)  Off Aldactone: 12.5 mg daily (last dose 1/4)  Encouraging fluid (water/juice) intake   Monitor    HTN (hypertension)  Assessment & Plan  BP low normal but better recently after Midodrine started  Has mild azotemia  Has mild anemia  TSH ok  Off Diovan (12/31 am)  Off Coreg (1/1)  Off Aldactone: 12.5 mg daily (last dose 1/4)  Orthostatics (+) -- see other assessment  Consider restarting Aldactone when BP better  Cont to monitor    CAD (coronary artery disease)  Assessment & Plan  S/P NSTEMI  Echo: EF 45%  BNP: 6008 (12/18) --> 2535 (12/30) --> 2906 (1/4)  Has 3 vessel disease per cardiac cath on 12/16/19 -- recommendations for medical management  Off Diovan & Coreg -- 2nd to low BP  Off Aldactone: 12.5 mg daily 2nd to lower BP (last dose 1/4)  On ASA  On Lipitor  Note: need out patient Cardiology follow up    Vitamin D deficiency  Assessment & Plan  Vit D: 20  On supplements    Normocytic anemia- (present on admission)  Assessment & Plan  H&H has been improving  On Fe supplements     Uncontrolled type 2 diabetes mellitus with hyperglycemia (HCC)- (present on admission)  Assessment & Plan  Hba1c: 8.5  BS better but still elevated: 166-216  Off Lantus  On Metformin: 1000 mg bid  On Glipizide: 2.5 mg bid (12/29) --> 5 mg qam & 2.5 mg qpm (1/2) --> 5 mg bid (1/3) --> 10 mg bid (1/4)  Will start Januvia: 50 mg  daily  Note: home meds include Metformin 1000 mg bid and Glipizide 5 mg bid  Cont to monitor    PVD (peripheral vascular disease) (HCC)  Assessment & Plan  Has hx of prior LLE revascularization  On ASA & Plavix  On Lipitor

## 2020-01-07 ENCOUNTER — HOME HEALTH ADMISSION (OUTPATIENT)
Dept: HOME HEALTH SERVICES | Facility: HOME HEALTHCARE | Age: 55
End: 2020-01-07
Payer: COMMERCIAL

## 2020-01-07 ENCOUNTER — TELEPHONE (OUTPATIENT)
Dept: CARDIOLOGY | Facility: MEDICAL CENTER | Age: 55
End: 2020-01-07

## 2020-01-07 PROBLEM — S91.301A NON-HEALING WOUND OF RIGHT HEEL: Status: ACTIVE | Noted: 2020-01-07

## 2020-01-07 LAB
GLUCOSE BLD-MCNC: 118 MG/DL (ref 65–99)
GLUCOSE BLD-MCNC: 166 MG/DL (ref 65–99)
GLUCOSE BLD-MCNC: 179 MG/DL (ref 65–99)
GLUCOSE BLD-MCNC: 203 MG/DL (ref 65–99)

## 2020-01-07 PROCEDURE — 87205 SMEAR GRAM STAIN: CPT

## 2020-01-07 PROCEDURE — 770010 HCHG ROOM/CARE - REHAB SEMI PRIVAT*

## 2020-01-07 PROCEDURE — 99233 SBSQ HOSP IP/OBS HIGH 50: CPT | Performed by: PHYSICAL MEDICINE & REHABILITATION

## 2020-01-07 PROCEDURE — 82962 GLUCOSE BLOOD TEST: CPT | Mod: 91

## 2020-01-07 PROCEDURE — 87186 SC STD MICRODIL/AGAR DIL: CPT

## 2020-01-07 PROCEDURE — A9270 NON-COVERED ITEM OR SERVICE: HCPCS | Performed by: PHYSICAL MEDICINE & REHABILITATION

## 2020-01-07 PROCEDURE — 87070 CULTURE OTHR SPECIMN AEROBIC: CPT

## 2020-01-07 PROCEDURE — A9270 NON-COVERED ITEM OR SERVICE: HCPCS | Performed by: HOSPITALIST

## 2020-01-07 PROCEDURE — 87077 CULTURE AEROBIC IDENTIFY: CPT

## 2020-01-07 PROCEDURE — 97530 THERAPEUTIC ACTIVITIES: CPT

## 2020-01-07 PROCEDURE — 700102 HCHG RX REV CODE 250 W/ 637 OVERRIDE(OP): Performed by: PHYSICAL MEDICINE & REHABILITATION

## 2020-01-07 PROCEDURE — 97110 THERAPEUTIC EXERCISES: CPT

## 2020-01-07 PROCEDURE — 97116 GAIT TRAINING THERAPY: CPT

## 2020-01-07 PROCEDURE — 700102 HCHG RX REV CODE 250 W/ 637 OVERRIDE(OP): Performed by: HOSPITALIST

## 2020-01-07 PROCEDURE — 97112 NEUROMUSCULAR REEDUCATION: CPT

## 2020-01-07 PROCEDURE — 97535 SELF CARE MNGMENT TRAINING: CPT

## 2020-01-07 PROCEDURE — 97150 GROUP THERAPEUTIC PROCEDURES: CPT

## 2020-01-07 PROCEDURE — 99233 SBSQ HOSP IP/OBS HIGH 50: CPT | Performed by: HOSPITALIST

## 2020-01-07 RX ORDER — MIDODRINE HYDROCHLORIDE 2.5 MG/1
2.5 TABLET ORAL DAILY
Status: DISCONTINUED | OUTPATIENT
Start: 2020-01-08 | End: 2020-01-08

## 2020-01-07 RX ADMIN — GLIPIZIDE 10 MG: 5 TABLET ORAL at 17:10

## 2020-01-07 RX ADMIN — INSULIN LISPRO 2 UNITS: 100 INJECTION, SOLUTION INTRAVENOUS; SUBCUTANEOUS at 20:33

## 2020-01-07 RX ADMIN — SITAGLIPTIN 50 MG: 50 TABLET, FILM COATED ORAL at 07:34

## 2020-01-07 RX ADMIN — INSULIN LISPRO 2 UNITS: 100 INJECTION, SOLUTION INTRAVENOUS; SUBCUTANEOUS at 07:35

## 2020-01-07 RX ADMIN — METFORMIN HYDROCHLORIDE 1000 MG: 500 TABLET ORAL at 07:34

## 2020-01-07 RX ADMIN — MULTIPLE VITAMINS W/ MINERALS TAB 1 TABLET: TAB at 07:34

## 2020-01-07 RX ADMIN — MIDODRINE HYDROCHLORIDE 2.5 MG: 2.5 TABLET ORAL at 06:04

## 2020-01-07 RX ADMIN — FERROUS SULFATE TAB 325 MG (65 MG ELEMENTAL FE) 325 MG: 325 (65 FE) TAB at 07:34

## 2020-01-07 RX ADMIN — INSULIN LISPRO 4 UNITS: 100 INJECTION, SOLUTION INTRAVENOUS; SUBCUTANEOUS at 17:10

## 2020-01-07 RX ADMIN — CLOPIDOGREL BISULFATE 75 MG: 75 TABLET ORAL at 20:33

## 2020-01-07 RX ADMIN — ASPIRIN 81 MG: 81 TABLET, COATED ORAL at 07:34

## 2020-01-07 RX ADMIN — METFORMIN HYDROCHLORIDE 1000 MG: 500 TABLET ORAL at 17:10

## 2020-01-07 RX ADMIN — VITAMIN D, TAB 1000IU (100/BT) 1000 UNITS: 25 TAB at 07:34

## 2020-01-07 RX ADMIN — GLIPIZIDE 10 MG: 5 TABLET ORAL at 11:18

## 2020-01-07 RX ADMIN — OXYCODONE HYDROCHLORIDE AND ACETAMINOPHEN 500 MG: 500 TABLET ORAL at 07:34

## 2020-01-07 RX ADMIN — OXYCODONE HYDROCHLORIDE AND ACETAMINOPHEN 500 MG: 500 TABLET ORAL at 20:33

## 2020-01-07 RX ADMIN — ATORVASTATIN CALCIUM 80 MG: 40 TABLET, FILM COATED ORAL at 20:33

## 2020-01-07 ASSESSMENT — PATIENT HEALTH QUESTIONNAIRE - PHQ9
1. LITTLE INTEREST OR PLEASURE IN DOING THINGS: NOT AT ALL
SUM OF ALL RESPONSES TO PHQ9 QUESTIONS 1 AND 2: 0
SUM OF ALL RESPONSES TO PHQ9 QUESTIONS 1 AND 2: 0
2. FEELING DOWN, DEPRESSED, IRRITABLE, OR HOPELESS: NOT AT ALL
2. FEELING DOWN, DEPRESSED, IRRITABLE, OR HOPELESS: NOT AT ALL
1. LITTLE INTEREST OR PLEASURE IN DOING THINGS: NOT AT ALL

## 2020-01-07 ASSESSMENT — ENCOUNTER SYMPTOMS
COUGH: 0
VOMITING: 0
BRUISES/BLEEDS EASILY: 0
NAUSEA: 0
SHORTNESS OF BREATH: 0
ABDOMINAL PAIN: 0
PALPITATIONS: 0
CHILLS: 0
EYES NEGATIVE: 1
FEVER: 0

## 2020-01-07 NOTE — PROGRESS NOTES
"Rehab Progress Note     Encounter Date: 1/7/2020    CC: LLE BKA, weakness, DM    Interval Events (Subjective)  Patient sitting up in room. Notified by RN of concern by wound team. Discussed with Lacy from wound team that after debridement of right ankle some bone is visible. They will discuss care with limb preservation service and recommending follow-up with Dr. Chatterjee with Vascular surgery.  Discussed that he also has follow-up with orthopedic surgeon.  Notified later in the morning that СВЕТЛАНА appointment had to be postponed until next Tuesday. Discussed with hospitalist and recommending ID consultation for possible need for antibiotics in setting of previous MDRO. Discussed with patient, he is frustrated as he was hoping to go home tomorrow. Discussed that he can home IV antibiotics possibly. Discussed we will make an appointment with Dr. Chatterjee and transport if still here. Discussed may have to delay discharge a few days. Patient expresses understanding.     IDT Team Meeting 1/7/2020    I, Oliverio Bai M.D., was present and led the interdisciplinary team conference on 1/7/2020.  I led the IDT conference and agree with the IDT conference documentation and plan of care as noted below.     RN:  Diet Regular   % Meal     Pain    Sleep    Bowel Continent   Bladder Continent   In's & Out's    Wound culture - stage 4 to bone    PT:  Bed Mobility    Transfers    Mobility Zac in WC; maxA for hopping    Stairs  4\" curb and 6\" curb   Limited by BP; recommended ramping but has not been doing it    OT:  Eating    Grooming    Bathing    UB Dressing Zac   LB Dressing Zac   Toileting SBA   Shower & Transfer SBA   Fatigued when doing ADLs    CM:  Continues to work on disposition and DME needs.      DC/Disposition:  TBD    Objective:  VITAL SIGNS: /80   Pulse 92   Temp 36.8 °C (98.2 °F) (Oral)   Resp 18   Ht 1.829 m (6')   Wt 70.5 kg (155 lb 6.8 oz)   SpO2 100%   BMI 21.08 kg/m²   Gen: NAD  Psych: " Mood and affect appropriate  CV: RRR, no edema  Resp: CTAB, no upper airway sounds  Abd: NTND  Neuro: AOx4, hopping with FWW  Unchanged from 1/6/20 except skin:  < 1 cm x 1 cm wound to right ankle with bone covered in medihoney, returned the mepilex to covering    Recent Results (from the past 72 hour(s))   ACCU-CHEK GLUCOSE    Collection Time: 01/04/20  5:19 PM   Result Value Ref Range    Glucose - Accu-Ck 186 (H) 65 - 99 mg/dL   ACCU-CHEK GLUCOSE    Collection Time: 01/04/20  9:36 PM   Result Value Ref Range    Glucose - Accu-Ck 269 (H) 65 - 99 mg/dL   ACCU-CHEK GLUCOSE    Collection Time: 01/05/20  8:47 AM   Result Value Ref Range    Glucose - Accu-Ck 175 (H) 65 - 99 mg/dL   ACCU-CHEK GLUCOSE    Collection Time: 01/05/20 11:02 AM   Result Value Ref Range    Glucose - Accu-Ck 215 (H) 65 - 99 mg/dL   ACCU-CHEK GLUCOSE    Collection Time: 01/05/20  5:02 PM   Result Value Ref Range    Glucose - Accu-Ck 216 (H) 65 - 99 mg/dL   ACCU-CHEK GLUCOSE    Collection Time: 01/05/20  8:47 PM   Result Value Ref Range    Glucose - Accu-Ck 195 (H) 65 - 99 mg/dL   ACCU-CHEK GLUCOSE    Collection Time: 01/06/20  8:02 AM   Result Value Ref Range    Glucose - Accu-Ck 166 (H) 65 - 99 mg/dL   ACCU-CHEK GLUCOSE    Collection Time: 01/06/20 11:03 AM   Result Value Ref Range    Glucose - Accu-Ck 160 (H) 65 - 99 mg/dL   ACCU-CHEK GLUCOSE    Collection Time: 01/06/20  5:06 PM   Result Value Ref Range    Glucose - Accu-Ck 182 (H) 65 - 99 mg/dL   ACCU-CHEK GLUCOSE    Collection Time: 01/06/20  9:01 PM   Result Value Ref Range    Glucose - Accu-Ck 244 (H) 65 - 99 mg/dL   ACCU-CHEK GLUCOSE    Collection Time: 01/07/20  7:32 AM   Result Value Ref Range    Glucose - Accu-Ck 166 (H) 65 - 99 mg/dL   ACCU-CHEK GLUCOSE    Collection Time: 01/07/20 11:16 AM   Result Value Ref Range    Glucose - Accu-Ck 118 (H) 65 - 99 mg/dL       Current Facility-Administered Medications   Medication Frequency   • [START ON 1/8/2020] midodrine (PROAMATINE) tablet 2.5  mg DAILY   • insulin lispro (HUMALOG) injection 2-12 Units 4X/DAY ACHS   • glipiZIDE (GLUCOTROL) tablet 10 mg BID AC   • senna-docusate (PERICOLACE or SENOKOT S) 8.6-50 MG per tablet 2 Tab BID PRN    And   • polyethylene glycol/lytes (MIRALAX) PACKET 1 Packet QDAY PRN    And   • magnesium hydroxide (MILK OF MAGNESIA) suspension 30 mL QDAY PRN    And   • bisacodyl (DULCOLAX) suppository 10 mg QDAY PRN   • therapeutic multivitamin-minerals (THERAGRAN-M) tablet 1 Tab DAILY   • ascorbic acid tablet 500 mg BID   • glucose 4 g chewable tablet 16 g Q15 MIN PRN    And   • dextrose 50% (D50W) injection 50 mL Q15 MIN PRN   • vitamin D (cholecalciferol) 1000 UNIT tablet 1,000 Units DAILY   • Respiratory Care per Protocol Continuous RT   • hydrALAZINE (APRESOLINE) tablet 25 mg Q8HRS PRN   • acetaminophen (TYLENOL) tablet 650 mg Q4HRS PRN   • artificial tears ophthalmic solution 1 Drop PRN   • benzocaine-menthol (CEPACOL) lozenge 1 Lozenge Q2HRS PRN   • mag hydrox-al hydrox-simeth (MAALOX PLUS ES or MYLANTA DS) suspension 20 mL Q2HRS PRN   • ondansetron (ZOFRAN ODT) dispertab 4 mg 4X/DAY PRN    Or   • ondansetron (ZOFRAN) syringe/vial injection 4 mg 4X/DAY PRN   • traZODone (DESYREL) tablet 50 mg QHS PRN   • sodium chloride (OCEAN) 0.65 % nasal spray 2 Spray PRN   • aspirin EC (ECOTRIN) tablet 81 mg DAILY   • atorvastatin (LIPITOR) tablet 80 mg Nightly   • clopidogrel (PLAVIX) tablet 75 mg QHS   • ferrous sulfate tablet 325 mg QDAY with Breakfast   • metFORMIN (GLUCOPHAGE) tablet 1,000 mg BID WITH MEALS   • oxyCODONE-acetaminophen (PERCOCET) 5-325 MG per tablet 1-2 Tab Q4HRS PRN       Orders Placed This Encounter   Procedures   • Diet Order Diabetic     Standing Status:   Standing     Number of Occurrences:   1     Order Specific Question:   Diet:     Answer:   Diabetic [3]     Order Specific Question:   Miscellaneous modifications:     Answer:   Vegetarian [13]       Assessment:  Active Hospital Problems    Diagnosis   • *S/P  BKA (below knee amputation) unilateral, left ([facility])   • PVD (peripheral vascular disease) ([facility])   • Vitamin D deficiency   • CAD (coronary artery disease)   • HTN (hypertension)   • Normocytic anemia   • Diabetic polyneuropathy associated with type 2 diabetes mellitus ([facility])   • Hyperlipidemia   • Uncontrolled type 2 diabetes mellitus with hyperglycemia ([facility])       Medical Decision Making and Plan:  L BKA - Patient with 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 Zhao on 12/20/19.  -PT and OT for mobility and ADLs  -NWB LLE. IPOP to LLE     R ankle wound - Patient with ankle wound being managed by wound consult. With exposed tissue including bone after debridement.  -Recommending outpatient clinic visit with Dr. Chatterjee. Consult ID for antibiotic recommendation. May delay discharge   -Wound culture ordered 1/7/19    Neuropathic pain - Patient would prefer no medications. PT working on desensitization and mirror therapy.     DM - Patient on Glipizide 5 mg qAM, Metformin 1000 mg BID and fingersticks   -Consult Hospitalist. Discontinued Lantus and increase Glipizide. Stopped Januvia per hospitalist     HTN/CV/PAD - Patient on ASA 81 mg and Plavix 75 QPM. Patient with recent TTE with EF of 40%. Patient on Coreg 6.25 mg, Spironolactone 25 mg daily, and Valsartan 40 mg BID  -Ongoing Hypotension, discussed with hospitalist. Decrease Spironolactone 12.5 mg daily. Discontinue Valsartan. Coreg discontinued, now with tachycardia   -Start Abdominal binder. Discontinue Spironolactone. Midodrine reduced to 2.5 mg daily and then trial of discontinue     HLD - Patient on Atorvastatin 80 mg QHS     Anemia - Patient on Fe supplement on transfer. Check AM CBC - 10 on admission.      Vitamin D - deficient on admission. Start 1000 U     DVT Ppx - Patient on Heparin on transfer. Hopping > 100 feet, discontinue Heparin.      Total time:  40 minutes.  I spent greater than 50% of the time for patient care, counseling, and  coordination on this date, including unit/floor time, and face-to-face time with the patient as per interval events and assessment and plan above. Topics discussed included discharge planning, wound care, ID consult, and delay in discharge. Patient was discussed separately in IDT today; please see details above.    Oliverio Bai M.D.

## 2020-01-07 NOTE — WOUND TEAM
"[facility]  Inpatient Services   Wound and Skin Care Progress Note      HPI, PMH, SH: Reviewed    Unit where seen by Wound Team: [facility]25/02     WOUND CONSULT RELATED TO:  Scheduled follow up on right heel pressure injury        Self Report / Pain Level:  \"It hurts a little.\"       OBJECTIVE:  Dressing in place, heel float boot on    WOUND TYPE, LOCATION, CHARACTERISTICS (Pressure Injuries: location, stage, POA or date identified)       Pressure Injury 12/31/19 Heel unstageable posterior right heel POA as of 01/06/2020 stage 4 (Active)   Wound Image    1/6/2020  5:00 PM   Pressure Injury Stage 4    State of Healing Non-healing    Site Assessment Red;Yellow    Angelica-wound Assessment Callused;Hyperpigmented    Margins Defined edges;Unattached edges    Wound Length (cm) 0.9 cm    Wound Width (cm) 0.7 cm    Wound Surface Area (cm^2) 0.63 cm^2    Post Wound Length (cm) 0.6 cm     Post Wound Width (cm) 1 cm    Post Wound Depth (cm) 0.5 cm    Post Wound Surface Area (cm^2) 0.6 cm^2    Tunneling 0 cm    Undermining 0.2 cm    Closure Secondary intention    Drainage Amount Moderate    Drainage Description Sanguineous    Non-staged Wound Description Not applicable    Treatments Cleansed;Site care    Cleansing Normal Saline Irrigation    Periwound Protectant Skin Protectant wipes to Periwound    Dressing Options Honey Alginate;Mepilex    Dressing Cleansing/Solutions Not Applicable    Dressing Changed New    Dressing Status Clean;Dry;Intact    Dressing Change Frequency Every 48 hrs    NEXT Dressing Change  01/08/20    NEXT Weekly Photo (Inpatient Only) 01/08/20    WOUND NURSE ONLY - Odor None    WOUND NURSE ONLY - Exposed Structures Bone    WOUND NURSE ONLY - Tissue Type and Percentage 60% white bone, 20% red, 20% yellow slough      INTERVENTIONS BY WOUND TEAM:  Performed hand hygiene, donned gloves, removed old dressing. Changed gloves, cleaned wound and periwound with NS and gauze 4 x 4s. Took pre-debridement photo " and measurements. Used curette to debride off less than 20 cm of yellow slough from wound bed then some periwound callus, moderate bleeding controlled with gauze and pressure. Cleaned wound again. changed gloves, took post debridement photo. Changed dressing selection slightly, applied dressing.    Dressing Selection:  No Sting to periwound, honey alginate inside wound bed, heel mepilex.         Interdisciplinary consultation: Patient, Bedside RN, will notify Dr. Bai in the morning.    EVALUATION: honey alginate can be shaped to fit into the depth of wound, provides appropriate level of moisture needed for wound healing and maintaining bone, promotes autolytic debridement of slough, and is antimicrobial.    Factors affecting wound healing: pressure, diabetes, neuropathy   Goals: Steady decrease in wound area and depth weekly.    NURSING PLAN OF CARE ORDERS (X):    Dressing changes: See Dressing Care orders: X  Skin care: See Skin Care orders:   Rectal tube care: See Rectal Tube Care orders:   Other orders:      WOUND TEAM PLAN OF CARE (X):   NPWT change 3 x week:        Dressing changes by wound team:       Follow up as needed:    X 1-2 times a week for right heel   Other (explain):

## 2020-01-07 NOTE — DISCHARGE PLANNING
I have met with patient and reviewed home health and dme plans.  We have ordered w/c and referred [facility].  D/c date is pending ID consult and recommendations.  Will follow.

## 2020-01-07 NOTE — REHAB-PT IDT TEAM NOTE
"Physical Therapy   Mobility  Bed mobility: Mod I  Bed /Chair/Wheelchair Transfer Initial:  4 - Minimal Assistance  Bed /Chair/Wheelchair Transfer Current:  6 - Modified Independent   Bed/Chair/Wheelchair Transfer Description:  Adaptive equipment(Mod I reach pivot wc <> bed)  Walk Initial:  1 - Total Assistance  Walk Current:  2 - Max Assistance   Walk Description:  Requires incidental assist(CGA/SBA FWW indoors 80 ft x 2 + 125 ft )  Wheelchair Initial:  1 - Total Assistance  Wheelchair Current:  6 - Modified Independent   Wheelchair Description:  (Indoors)  Stairs Initial:  0 - Not tested,unsafe activity  Stairs Current: 1 - Total Assistance   Stairs Description: Extra time, Safety concerns, Verbal cueing, Hand rails, Supervision for safety, Ascends/descends less than 4 steps(Up/down 2\" step in // bars and CGA)  Patient/Family Training/Education: Safety with mobility, BP norms, residual limb care.  DME/DC Recommendations: Manual wc with pressure reducing wc cushion, and swing away stump board, FWW  Strengths:  Independent PLOF, Making steady progress towards goals, Manages pain appropriately, Motivated for self care and independence, Pleasant and cooperative, Supportive family and Willingly participates in therapeutic activities  Barriers:   Decreased endurance, Orthostatic hypotension, Limited mobility, Poor balance and Other: Low BP  # of short term goals set= 3  # of short term goals met= 3  Physical Therapy Problems     Problem: Mobility     Dates: Start: 01/06/20       Description:     Goal: STG-Within one week, patient will ambulate community distances     Dates: Start: 01/06/20       Description: 1) Individualized goal:  Patient will amb >150 ft with FWW indoors SPV, and mod I outdoors with wc mob  2) Interventions:  PT Group Therapy, PT E Stim Attended, PT Gait Training, PT Therapeutic Exercises, PT Neuro Re-Ed/Balance, PT Aquatic Therapy, PT Therapeutic Activity and PT Manual Therapy             Goal: " STG-Within one week, patient will ambulate up/down a curb     Dates: Start: 01/06/20       Description: 1) Individualized goal:  Patient will amb up/down curb 2x with FWW CGA  2) Interventions:   PT Group Therapy, PT E Stim Attended, PT Gait Training, PT Therapeutic Exercises, PT Neuro Re-Ed/Balance, PT Aquatic Therapy, PT Therapeutic Activity and PT Manual Therapy                   Problem: Mobility Transfers     Dates: Start: 01/06/20       Description:     Goal: STG-Within one week, patient will sit to stand     Dates: Start: 01/06/20       Description: 1) Individualized goal:  Patient will transfer mod I with FWW STS/SPT  2) Interventions:   PT Group Therapy, PT E Stim Attended, PT Gait Training, PT Therapeutic Exercises, PT Neuro Re-Ed/Balance, PT Aquatic Therapy, PT Therapeutic Activity and PT Manual Therapy                   Problem: PT-Long Term Goals     Dates: Start: 12/28/19       Description:     Goal: LTG-By discharge, patient will propel wheelchair     Dates: Start: 12/28/19       Description: 1) Individualized goal:  300 feet indoors/outdoors at Mod I level  2) Interventions:  PT Group Therapy, PT Prosthetic Training, PT Gait Training, PT Self Care/Home Eval, PT Therapeutic Exercises, PT TENS Application, PT Neuro Re-Ed/Balance, PT Therapeutic Activity and PT Manual Therapy           Goal: LTG-By discharge, patient will ambulate     Dates: Start: 12/28/19       Description: 1) Individualized goal:  with FWW for 25 feet indoors at SPV level  2) Interventions:  PT Group Therapy, PT Prosthetic Training, PT Gait Training, PT Self Care/Home Eval, PT Therapeutic Exercises, PT TENS Application, PT Neuro Re-Ed/Balance, PT Therapeutic Activity and PT Manual Therapy             Goal: LTG-By discharge, patient will transfer one surface to another     Dates: Start: 12/28/19       Description: 1) Individualized goal:  with FWW at Mod I level for bed transfers  2) Interventions:  PT Group Therapy, PT Prosthetic  Training, PT Gait Training, PT Self Care/Home Eval, PT Therapeutic Exercises, PT TENS Application, PT Neuro Re-Ed/Balance, PT Therapeutic Activity and PT Manual Therapy               Goal: LTG-By discharge, patient will transfer in/out of a car     Dates: Start: 12/28/19       Description: 1) Individualized goal:  with FWW at SPV level for car transfers  2) Interventions:  PT Group Therapy, PT Prosthetic Training, PT Gait Training, PT Self Care/Home Eval, PT Therapeutic Exercises, PT TENS Application, PT Neuro Re-Ed/Balance, PT Therapeutic Activity and PT Manual Therapy                           Section completed by:  Montse Cabrera PT, DPT

## 2020-01-07 NOTE — DISCHARGE PLANNING
DME referral sent to [facility] per choice form.  HH referral sent to [facility] per choice form.  Awaiting responses.

## 2020-01-07 NOTE — REHAB-NURSING IDT TEAM NOTE
Nursing   Nursing  Diet/Nutrition:  Diabetic and Thin Liquids  Medication Administration:  Whole with Liquid Wash  % consumed at meals in last 24 hours:  Consumed 75%-100% of meals per documentation.  Meal/Snack Consumption for the past 24 hrs:   Oral Nutrition   01/06/20 1800 Dinner;Between % Consumed   01/06/20 1129 Lunch;Between % Consumed   01/06/20 0931 Breakfast;Between % Consumed     Snack schedule:  HS  Appetite:  Good  Fluid Intake/Output in past 24 hours: In: 1420 [P.O.:1420]  Out: -   Admit Weight:  Weight: 78.6 kg (173 lb 4.5 oz)  Weight Last 7 Days   [70.5 kg (155 lb 6.8 oz)] 70.5 kg (155 lb 6.8 oz) (01/05 1520)    Pain Issues:    Location:  --  --         Severity:  Denies   Scheduled pain medications:  None     PRN pain medications used in last 24 hours:  None   Non Pharmacologic Interventions:  warm blanket, repositioned and rest  Effectiveness of pain management plan:  good=patient states acceptable comfort after interventions    Bowel:    Bowel Assist Initial Score:  3 - Moderate Assistance  Bowel Assist Current Score:  5 - Standby Prompting/Supervision or Set-up  Bowl Accidents in last 7 days:     Last bowel movement: 01/06/20(per pt)  Stool Description: Medium, Brown, Soft     Usual bowel pattern:  every other day  Scheduled bowel medications:  None  PRN bowel medications used in last 24 hours:  None  Nursing Interventions:  Increased time, Scheduled medication  Effectiveness of bowel program:   fair=sometimes needs prn bowel meds for constipation  Bladder:    Bladder Assist Initial Score:  3 - Moderate Assistance  Bladder Assist Current Score:  5 - Standby Prompting/Supervision or Set-up  Bladder Accidents in last 7 days:     PVR range for past 24-48 hours: -- ()  Intermittent Catheterization:    Medications affecting bladder:  None    Interventions:  Increased time, Emptying of device, Urinal  Effectiveness of bladder training:  Good=regular, predictable, emptying of bladder,  patient initiates time voiding    Diabetes:  Blood Sugar Frequency:  Before meals and at bedtime    Range of BS for last 48 hours:     Recent Labs     01/05/20  0847 01/05/20  1102 01/05/20  1702 01/05/20  2047 01/06/20  0802 01/06/20  1103 01/06/20  1706 01/06/20  2101   POCGLUCOSE 175* 215* 216* 195* 166* 160* 182* 244*     Scheduled diabetic medications:  insulin lispro (HUMALOG) injection , Glipizide, Metformin, Januvia  Sliding scale usage in past 24 hours:  Yes  Total Short acting insulin in the past 24 hours:  Humalog 10 units  Total Long acting insulin in the past 24 hours:  None    Wound: Left BKA, Right heel pressure ulcer          Interventions: LBKA: sutures, dry gauze, petrolatum gauze, non adherent; R heel: honey alginate, mepilex  Effectiveness of intervention:  wound is unchanged     Sleep/wake cycle:   Average hours slept:  Sleeps 4-6 hours without waking  Sleep medication usage:  None    Patient/Family Training/Education:  Bladder Management/Training, Bowel Management/Training, Diabetes Management, Diet/Nutrition, Fall Prevention, General Self Care, Medication Management, Pain Management, Respiratory Hygiene, Safety and Wound Care  Discharge Supply Recommendations:  Glucometer and Strips and Wound Care Supplies  Strengths: Alert and oriented, Able to follow instructions, Supportive family and Pleasant and cooperative   Barriers:   Generalized weakness and Limited mobility       Nursing Problems     Problem: Bowel/Gastric:     Description:     Goal: Normal bowel function is maintained or improved     Description:           Goal: Will not experience complications related to bowel motility     Description:                 Problem: Communication     Description:     Goal: The ability to communicate needs accurately and effectively will improve     Description:                 Problem: Discharge Barriers/Planning     Description:     Goal: Patient's continuum of care needs will be met     Description:                  Problem: Infection     Description:     Goal: Will remain free from infection     Description:                 Problem: Knowledge Deficit     Description:     Goal: Knowledge of disease process/condition, treatment plan, diagnostic tests, and medications will improve     Description:           Goal: Knowledge of the prescribed therapeutic regimen will improve     Description:                 Problem: Pain Management     Description:     Goal: Pain level will decrease to patient's comfort goal     Description:                 Problem: Safety     Description:     Goal: Will remain free from injury     Description:           Goal: Will remain free from falls     Description:                 Problem: Venous Thromboembolism (VTW)/Deep Vein Thrombosis (DVT) Prevention:     Description:     Goal: Patient will participate in Venous Thrombosis (VTE)/Deep Vein Thrombosis (DVT)Prevention Measures     Description:                        Long Term Goals:   At discharge patient will be able to function safely at home and in the community with support.    Section completed by:  Tara Ingram R.N.

## 2020-01-07 NOTE — DISCHARGE PLANNING
Received a phone call from [facility] APN at [facility].  Wound team has discussed patient's wound on his heel.  Nurse practitioner had conversation with patient's vascular surgeon, Dr. Chatterjee.  He wants to see patient as soon as can be arranged.  I have updated Dr. Bai and gave information to charge nurse for scheduling and arranging transport.

## 2020-01-07 NOTE — PROGRESS NOTES
0715: Bedside report received, assumed care for this patient.  Patient is A&O x 4.  VSS.  Communication board updated, call light and belongings are within reach.  Bed is in low position. Patient appears in no distress, and has no complaints of SOB and 0/10 of pain.  Will be medicated per MAR.  Plan of care discussed and agreed upon with patient.  Appt for 745 AM cancelled,  currently trying to reschedule.

## 2020-01-07 NOTE — DISCHARGE PLANNING
We are currently verifying this patient's insurance and benefits. This will be resolved ASAP. Thank you for your patience.     Respectfully,   [facility]

## 2020-01-07 NOTE — THERAPY
Occupational Therapy  Daily Treatment     Patient Name: sIrael Aviles  Age:  54 y.o., Sex:  male  Medical Record #: [id number]  Today's Date: 1/7/2020     Precautions  Precautions: No Weight Bearing Restrictions Left Lower Extremity, Fall Risk  Comments: Low BP, IPOP LLE, MDRO contact precautions , abdominal binder for hypotension    Safety   ADL Safety : (P) Modified Independent  Bathroom Safety: (P) Modified Independent  Comments: Supervision due to variable BP and orthostatic hypotension     Subjective    Pt reports feeling very good, BP is much more stable and he is using salt instead of medicine and it's so much better.     Objective       01/07/20 1001   Precautions   Precautions Non Weight Bearing Left Lower Extremity   Comments Low BP at times, monitor closely   Safety    ADL Safety  Modified Independent   Bathroom Safety Modified Independent   Vitals   Pulse 80   Patient BP Position Sitting   Blood Pressure 102/56   Respiration 17   Pain   Intervention Declines   Pain 0 - 10 Group   Pain Rating Scale (NPRS) 0   Cognition    Level of Consciousness Alert   Passive ROM Upper Body   Passive ROM Upper Body WDL   Active ROM Upper Body   Active ROM Upper Body  WDL   Strength Upper Body   Upper Body Strength  WDL   Sitting Upper Body Exercises   Upper Extremity Bike Level 6 Resistance   Other Exercise Hydrabike   Comments standing x 5 min and 3 min with 3 min rest break in between   Standing Upper Body Exercises   Comments standing x 5 min and 3 min with 3 min rest break in between   Bed Mobility    Supine to Sit Modified Independent   Sit to Supine Modified Independent   Interdisciplinary Plan of Care Collaboration   IDT Collaboration with  Nursing;Physical Therapist   Patient Position at End of Therapy In Bed   OT Total Time Spent   OT Individual Total Time Spent (Mins) 30   OT Charge Group   OT Therapy Activity 1   OT Therapeutic Exercise  1       Assessment    Pt tolerating standing activity and UB  exercise very well today.  Last week low BP was a continuous challenge and limited him to seated activities.  Today he stood for most of the session.  He did the UE hydrobike standing with SBA for balance and he walked using the FWW  With SBA.  He did fatigue near the end of the session after walking half way back to his room, he quickly plopped down in the w/c without much warning.  We discussed pushing himself, but also becoming more aware of when he's starting to get tired and needs to sit down so he doesn't fall. He acknowledged understanding.  Will likely need some reinforcement of that idea.    Plan    Pt needs to practice car transfers tomorrow with wife.  Scheduled him with PT for 9 am to accomplish that.  Plan is to DC home in 2 days.  Met with wife today during OT and she feels comfortable with assisting him with ADLs as needed--which is very minimal.  We discussed home accessibility, and possibly some work related tasks he could do in the near future from the w/c level as his leg heals.   came by as well and is working on getting him the w/c he needs with the associated residual limb leg rest.

## 2020-01-07 NOTE — THERAPY
Occupational Therapy  Daily Treatment     Patient Name: Israel Aviles  Age:  54 y.o., Sex:  male  Medical Record #: [id number]  Today's Date: 2020     Precautions  Precautions: (P) Non Weight Bearing Left Lower Extremity  Comments: (P) Low BP at times    Safety   ADL Safety : (P) Modified Independent  Bathroom Safety: (P) Modified Independent, Requires Supervision for Safety  Comments: Supervision due to variable BP and orthostatic hypotension     Subjective    Pt reports feeling good and ready for a shower.     Objective       20 1231   Precautions   Precautions Non Weight Bearing Left Lower Extremity   Comments Low BP at times   Safety    ADL Safety  Modified Independent   Bathroom Safety Modified Independent;Requires Supervision for Safety   Vitals   Blood Pressure 106/58   Pain 0 - 10 Group   Location Leg   Location Orientation Distal   Pain Rating Scale (NPRS) 0   Cognition    Level of Consciousness Alert   Sitting Upper Body Exercises   Chest Press 3 sets of 10   Bicep Curls 3 sets of 10   Balance   Sitting Balance (Static) Normal   Sitting Balance (Dynamic) Good   Bed Mobility    Supine to Sit Modified Independent   Sit to Supine Modified Independent   OT Total Time Spent   OT Individual Total Time Spent (Mins) 60   OT Charge Group   OT Self Care / ADL 3   OT Therapeutic Exercise  1       FIM Bathing Score:  5 - Standby Prompting/Supervision or Set-up  Bathing Description:       FIM Upper Body Dressin - Independent  Upper Body Dressing Description:       FIM Lower Body Dressing Score:  6 - Modified Indep    FIM Toileting Body Dressin - SUPV  Toileting Description:       FIM Bed/Chair/Wheelchair Transfers Score: 6 - Modified Indep  Bed/Chair/Wheelchair Transfers Description:       FIM Toilet Transfer Score:   6 Modified Indep  Toilet Transfer Description:       FIM Tub/Shower Transfers Score:  5 -SUPV  Tub/Shower Transfers Description:         Assessment    Pt seen for bathroom ADLs.   Pt performed shower/shower transfers with SBA/SUPV for safety.  Pt bathed himself Mod I, seated on shower chair, using grab bars and hand held shower.  Pt dressed and undressed himself, both UB and LB with Mod I from the shower seat.  He demonstrated good consistent safety awareness during ADL transfers, functional mobility, and during bathing.  He is now able to don and doff the residual limb  and the protective brace that goes over it Indep. Level.      Plan    Plan is to work with the wife a little more tomorrow before the planned DC in 2 days.  She feels very comfortable with assisting with ADLs, but expressed desire for car tranfer training. We set that up for 9am tomorrow am with PT. For the pt, continue with functional mobility, standing tolerance activities, and UB strengthening and endurance.

## 2020-01-07 NOTE — THERAPY
Physical Therapy   Daily Treatment     Patient Name: Israel Aviles  Age:  54 y.o., Sex:  male  Medical Record #: [id number]  Today's Date: 1/7/2020     Precautions  Precautions: (P) Non Weight Bearing Left Lower Extremity  Comments: (P) Low BP at times       01/07/20 1401   Precautions   Precautions Non Weight Bearing Left Lower Extremity   Comments Low BP at times   Interdisciplinary Plan of Care Collaboration   Patient Position at End of Therapy Seated;Other (Comments)  (hand off to therapy )   PT Total Time Spent   PT Group Total Time Spent (Mins) 60   PT Charge Group   PT Group Therapy Group Activities       Subjective:     Pt attended a 60 minute Safe Skin education group targeting prevention and treatment of pressure injuries. Pt was alert during group training session.      Reason for group therapy: To Increase Active Participation through Peer Pressure; To Enhance Motivation; To Increase Patient Interaction during Physical Recovery; To Facilitate Goal Discussion    Other treatments provided: Group therapy topic: Post session, pt indicated good level of knowledge of risk factors and was able questions about skin care risks or prevention. Pt was educated on pressure injury risk factors via auditory and visual modalities (printed handout reviewed and provided) in addition to demonstration, practice and teach back methods. Topics reviewed included risk factors for pressure injuries, staging of pressure injuries, staging pressure injuries and prevention of pressure injuries. Patient was encouraged to ask questions, did so, and all questions were answered to patient’s satisfaction. Patient was counseled on the importance of active communication with interdisciplinary team to address risk factors and prevent pressure injuries. Pt benefited from participating in this skin education class as evidenced by verbalizing improved understanding of prevention of pressure injuries and modifications to mitigate risk  factors.      Assessment:   Pt benefited from participating in the Safe Skin education class and demonstrated increased understanding of controllable risk factors as evidenced by participation in group Safe Skin class and ability to demonstrate adequate pressure relief techniques.      Plan:   Continue ongoing education to prevent pressure injuries including assessment of areas at high risk and strategies to mitigate this risk. Assess for carryover of education provided in Safe Skin class.

## 2020-01-07 NOTE — CARE PLAN
Problem: Safety  Goal: Will remain free from falls  Intervention: Implement fall precautions  Note:   Use of call light encouraged to make needs known.Fall precautions and frequent rounding in place for safety.Call light within reach.Will continue to monitor and assess needs and safety.     Problem: Acute Care of the Diabetic Patient  Goal: Optimal Outcome for the Diabetic Patient  Intervention: Monitor Glucose levels per MD Orders  Note:   FSBS 244 at hs.Coverage given with snack.Will continue to monitor and assess for s/sx of hyper/hypoglycemia.

## 2020-01-07 NOTE — CARE PLAN
Problem: Mobility  Goal: STG-Within one week, patient will propel wheelchair community  Description  1) Individualized goal:  2x 150 feet at SPV level indoors or Min A outdoors with ramps  2) Interventions:  PT Group Therapy, PT Prosthetic Training, PT Gait Training, PT Self Care/Home Eval, PT Therapeutic Exercises, PT TENS Application, PT Neuro Re-Ed/Balance, PT Therapeutic Activity and PT Manual Therapy   Outcome: MET  Goal: STG-Within one week, patient will ambulate household distance  Description  1) Individualized goal:  30 ft with FWW CGA-SBA indoors   2) Interventions:  PT Group Therapy, PT E Stim Attended, PT Gait Training, PT Therapeutic Exercises, PT Neuro Re-Ed/Balance, PT Aquatic Therapy, PT Therapeutic Activity and PT Manual Therapy     Outcome: MET     Problem: Mobility Transfers  Goal: STG-Within one week, patient will transfer bed to chair  Description  1) Individualized goal: With FWW at SPV level  2) Interventions: PT Group Therapy, PT Prosthetic Training, PT Gait Training, PT Self Care/Home Eval, PT Therapeutic Exercises, PT TENS Application, PT Neuro Re-Ed/Balance, PT Therapeutic Activity and PT Manual Therapy     Outcome: MET

## 2020-01-07 NOTE — DISCHARGE PLANNING
ATTN: Case Management  RE: Referral for Home Health    As of  01/07/2020, we have accepted the Home Health referral for the patient listed above.    A [facility] clinician will be out to see the patient within 48 hours. If you have any questions or concerns regarding the patient's transition to Home Health, please do not hesitate to contact us at [phone].      We look forward to collaborating with you,  [facility] Team

## 2020-01-07 NOTE — PROGRESS NOTES
Hospital Medicine Daily Progress Note      Chief Complaint  CHF, HTN, DM    Interval Problem Update  Denies new complaints but pt asking to reduce pill burden.    Review of Systems  Review of Systems   Constitutional: Negative for chills and fever.   HENT: Negative.    Eyes: Negative.    Respiratory: Negative for cough and shortness of breath.    Cardiovascular: Negative for chest pain and palpitations.   Gastrointestinal: Negative for abdominal pain, nausea and vomiting.   Genitourinary: Negative.    Musculoskeletal:        Wound pain   Skin: Negative for itching and rash.   Endo/Heme/Allergies: Does not bruise/bleed easily.        Physical Exam  Temp:  [36.7 °C (98 °F)-36.9 °C (98.5 °F)] 36.8 °C (98.2 °F)  Pulse:  [] 92  Resp:  [18] 18  BP: ()/(64-80) 119/80  SpO2:  [100 %] 100 %    Physical Exam  Vitals signs reviewed.   Constitutional:       General: He is not in acute distress.     Appearance: Normal appearance. He is not ill-appearing.   HENT:      Head: Normocephalic and atraumatic.      Right Ear: External ear normal.      Left Ear: External ear normal.      Nose: Nose normal.   Eyes:      General:         Right eye: No discharge.         Left eye: No discharge.      Extraocular Movements: Extraocular movements intact.      Conjunctiva/sclera: Conjunctivae normal.   Neck:      Musculoskeletal: Normal range of motion and neck supple.   Cardiovascular:      Rate and Rhythm: Normal rate and regular rhythm.   Pulmonary:      Effort: Pulmonary effort is normal. No respiratory distress.      Breath sounds: Normal breath sounds. No wheezing.   Abdominal:      General: Abdomen is flat. Bowel sounds are normal.      Palpations: Abdomen is soft.   Musculoskeletal:      Comments: Left BKA dressed, right heel wound photos reviewed   Skin:     General: Skin is warm and dry.   Neurological:      General: No focal deficit present.      Mental Status: He is alert and oriented to person, place, and time.          Fluids    Intake/Output Summary (Last 24 hours) at 1/7/2020 1237  Last data filed at 1/7/2020 0900  Gross per 24 hour   Intake 1050 ml   Output --   Net 1050 ml       Laboratory                        Assessment/Plan  * S/P BKA (below knee amputation) unilateral, left (HCC)  Assessment & Plan  Left foot osteomyelitis S/P BKA on 12/20/19 by Dr. Zhao  Completed intravenous antimicrobial therapy per Infectious Diseases  Wound care and pain control    Non-healing wound of right heel  Assessment & Plan  S/P wound debridement w/ exposed bone  Wound GS+Cx in progress  Suspect will need 6 wks of IV abx  Get ID consult once microbiology results available    Azotemia  Assessment & Plan  Now off Aldactone  Continue to encourage PO fluids    HTN (hypertension)  Assessment & Plan  Off Valsartan, Coreg, and Aldactone for hypotension  Now on Midodrine for blood pressure support  Suspect pt's premorbid baseline BP's in the 90's range  Will try to taper off pressor if pt asymptomatic    CAD (coronary artery disease)  Assessment & Plan  S/P NSTEMI w/ Echo showing EF 45%  Has three vessel coronary artery disease per Cardiac Cath on 12/16/19 w/ recommendations for medical management  Off ARB, B-Bl, and diuretic due to hypotension and azotemia  Continue ASA, Plavix, and Lipitor  Outpt Cardiology F/U    Vitamin D deficiency  Assessment & Plan  Vit D level 20  Continue supplementation    Normocytic anemia- (present on admission)  Assessment & Plan  Pt Hb dropped 3 g between June 2018 and June 2019  Continue Fe supplements for low iron stores  Consider outpt GI consult in this 53 yo male w/ a fairly rapid drop in H/H due to CURTIS    Uncontrolled type 2 diabetes mellitus with hyperglycemia (HCC)- (present on admission)  Assessment & Plan  HbA1c 8.5  On Metformin  1000 mg bid and Glipizide 10 mg bid  Trial off Januvia and observe serum glucose trends  Outpt meds include Metformin 1000 mg bid and Glipizide 5 mg bid    PVD (peripheral  vascular disease) (HCC)  Assessment & Plan  Has h/o prior LLE revascularization  On ASA, Plavix, and Lipitor  Outpt Vascular F/U    Full Code    Reviewed w/ pt, wife, and Dr. Bai

## 2020-01-07 NOTE — THERAPY
"Physical Therapy   Daily Treatment     Patient Name: Israel Aviles  Age:  54 y.o., Sex:  male  Medical Record #: [id number]  Today's Date: 1/7/2020     Precautions  Precautions: No Weight Bearing Restrictions Left Lower Extremity, Fall Risk  Comments: Low BP, IPOP LLE, MDRO contact precautions , abdominal binder for hypotension    Subjective    Patient reported that he will have his measurements from spouse at out next session. Patient was dissapointed that he appointment was cancelled this morning, and that his staples will not be removed until rescheduled appointment on the 14th.      Objective       01/07/20 1031   Precautions   Precautions No Weight Bearing Restrictions Left Lower Extremity;Fall Risk   Comments Low BP, IPOP LLE, MDRO contact precautions , abdominal binder for hypotension   Bed Mobility    Sit to Stand Stand by Assist  (FWW)   Neuro-Muscular Treatments   Neuro-Muscular Treatments Weight Shift Left;Weight Shift Right;Verbal Cuing;Tactile Cuing;Sequencing   Comments Up/down 5\" step in // bars 4x in // bars CGA.   Interdisciplinary Plan of Care Collaboration   IDT Collaboration with  Nursing;Physician   Patient Position at End of Therapy Seated   Collaboration Comments R heel wound will be cultured.  No restrictions on RLE at this time. POC. Phone call to patient's spouse to measure step height; not interested in ramp at this time.    PT Total Time Spent   PT Individual Total Time Spent (Mins) 60   PT Charge Group   PT Gait Training 1   PT Neuromuscular Re-Education / Balance 1   PT Therapeutic Activities 2     Discussion over safety concerns, and continued recommendation for ramp to enter/ exit home.     FIM Wheelchair Score:  6 - Modified Independent  Wheelchair Description:       FIM Stairs Score:  2 - Max Assistance  Stairs Description:  Ascends/descends 4 to 11 steps(Min A up/down 2\" platform step 4x CGA. Up/down 4 x 4\" platform step with FWW min A. )      Assessment    Patient progressed " "to platform steps with FWW, up to 4\".  Patient was limited by low BP ranging from 99/55 to 89/56, and intermittent dizziness.     Plan    Progress to 6\" curb with FWW. Montior BP and dizziness. Family training with spouse, car transfer, D/C data collection.     "

## 2020-01-07 NOTE — ASSESSMENT & PLAN NOTE
S/P wound debridement w/ exposed bone  Wound Cx +Carbapenem Resistant Klebsiella and Yeast  Per Dr. Gonzalez, wound does not appear clinically infected so hold off on antibiotics, continue local wound care, and close monitoring

## 2020-01-07 NOTE — REHAB-COLLABORATIVE ONGOING IDT TEAM NOTE
Weekly Interdisciplinary Team Conference Note    Weekly Interdisciplinary Team Conference # 2  Date:  1/7/2020    Clinicians present and reporting at team conference include the following:   MD: MARITZA Bai MD    RN:  Collette Naylor RN   PT:   Montse Cabrera PT, DPT  OT:  Shameka Mckinley OTR/L   ST:  Not Applicable.   CM:  Peggy Sigala RN [facility]  REC:  None  RT:  None  RPh:  Kaitlynn Zaldivar [facility]  Other:   None  All reporting clinicians have a working knowledge of this patient's plan of care.    Targeted DC Date:  TBD     Medical    Patient Active Problem List    Diagnosis Date Noted   • Osteomyelitis of left foot (formerly [facility]) 08/21/2019     Priority: High   • PVD (peripheral vascular disease) (formerly [facility]) 06/21/2019     Priority: Low   • Non-healing wound of right heel 01/07/2020   • Orthostatic hypotension 01/06/2020   • Tachycardia 01/03/2020   • Azotemia 12/31/2019   • Vitamin D deficiency 12/28/2019   • CAD (coronary artery disease) 12/28/2019   • HTN (hypertension) 12/28/2019   • S/P BKA (below knee amputation) unilateral, left (formerly [facility]) 12/27/2019   • Normocytic anemia 08/21/2019   • Diabetic foot (formerly [facility]) 08/21/2019   • Pressure injury of deep tissue of left foot 07/30/2019   • Diabetic ulcer of toe of left foot associated with type 2 diabetes mellitus, with necrosis of muscle (formerly [facility]) 07/30/2019   • Diabetic foot ulcer (formerly [facility]) 07/07/2019   • Diabetic polyneuropathy associated with type 2 diabetes mellitus (formerly [facility]) 07/07/2019   • Wound infection 05/24/2019   • Uncontrolled type 2 diabetes mellitus with hyperglycemia (formerly [facility]) 05/14/2019   • Hyperlipidemia 05/14/2019   • Acute ischemic stroke (formerly [facility]) 06/15/2018     Results     Procedure Component Value Ref Range Date/Time    ACCU-CHEK GLUCOSE [[id number]]  (Abnormal) Collected:  01/07/20 1116    Order Status:  Completed Lab Status:  Final result Updated:  01/07/20 1133     Glucose - Accu-Ck 118 65 - 99 mg/dL     ACCU-CHEK GLUCOSE [[id number]]  (Abnormal) Collected:  01/07/20 0732    Order Status:   Completed Lab Status:  Final result Updated:  01/07/20 0900     Glucose - Accu-Ck 166 65 - 99 mg/dL     ACCU-CHEK GLUCOSE [[id number]]  (Abnormal) Collected:  01/06/20 2101    Order Status:  Completed Lab Status:  Final result Updated:  01/06/20 2203     Glucose - Accu-Ck 244 65 - 99 mg/dL     ACCU-CHEK GLUCOSE [[id number]]  (Abnormal) Collected:  01/06/20 1706    Order Status:  Completed Lab Status:  Final result Updated:  01/06/20 1750     Glucose - Accu-Ck 182 65 - 99 mg/dL         Nursing  Diet/Nutrition:  Diabetic and Thin Liquids  Medication Administration:  Whole with Liquid Wash  % consumed at meals in last 24 hours:  Consumed 75%-100% of meals per documentation.  Meal/Snack Consumption for the past 24 hrs:   Oral Nutrition   01/06/20 1800 Dinner;Between % Consumed   01/06/20 1129 Lunch;Between % Consumed   01/06/20 0931 Breakfast;Between % Consumed     Snack schedule:  HS  Appetite:  Good  Fluid Intake/Output in past 24 hours: In: 1420 [P.O.:1420]  Out: -   Admit Weight:  Weight: 78.6 kg (173 lb 4.5 oz)  Weight Last 7 Days   [70.5 kg (155 lb 6.8 oz)] 70.5 kg (155 lb 6.8 oz) (01/05 1520)    Pain Issues:    Location:  --  --         Severity:  Denies   Scheduled pain medications:  None     PRN pain medications used in last 24 hours:  None   Non Pharmacologic Interventions:  warm blanket, repositioned and rest  Effectiveness of pain management plan:  good=patient states acceptable comfort after interventions    Bowel:    Bowel Assist Initial Score:  3 - Moderate Assistance  Bowel Assist Current Score:  5 - Standby Prompting/Supervision or Set-up  Bowl Accidents in last 7 days:     Last bowel movement: 01/06/20(per pt)  Stool Description: Medium, Brown, Soft     Usual bowel pattern:  every other day  Scheduled bowel medications:  None  PRN bowel medications used in last 24 hours:  None  Nursing Interventions:  Increased time, Scheduled medication  Effectiveness of bowel program:   fair=sometimes  needs prn bowel meds for constipation  Bladder:    Bladder Assist Initial Score:  3 - Moderate Assistance  Bladder Assist Current Score:  5 - Standby Prompting/Supervision or Set-up  Bladder Accidents in last 7 days:     PVR range for past 24-48 hours: -- ()  Intermittent Catheterization:    Medications affecting bladder:  None    Interventions:  Increased time, Emptying of device, Urinal  Effectiveness of bladder training:  Good=regular, predictable, emptying of bladder, patient initiates time voiding    Diabetes:  Blood Sugar Frequency:  Before meals and at bedtime    Range of BS for last 48 hours:     Recent Labs     01/05/20  0847 01/05/20  1102 01/05/20  1702 01/05/20  2047 01/06/20  0802 01/06/20  1103 01/06/20  1706 01/06/20  2101   POCGLUCOSE 175* 215* 216* 195* 166* 160* 182* 244*     Scheduled diabetic medications:  insulin lispro (HUMALOG) injection , Glipizide, Metformin, Januvia  Sliding scale usage in past 24 hours:  Yes  Total Short acting insulin in the past 24 hours:  Humalog 10 units  Total Long acting insulin in the past 24 hours:  None    Wound: Left BKA, Right heel pressure ulcer          Interventions: LBKA: sutures, dry gauze, petrolatum gauze, non adherent; R heel: honey alginate, mepilex  Effectiveness of intervention:  wound is unchanged     Sleep/wake cycle:   Average hours slept:  Sleeps 4-6 hours without waking  Sleep medication usage:  None    Patient/Family Training/Education:  Bladder Management/Training, Bowel Management/Training, Diabetes Management, Diet/Nutrition, Fall Prevention, General Self Care, Medication Management, Pain Management, Respiratory Hygiene, Safety and Wound Care  Discharge Supply Recommendations:  Glucometer and Strips and Wound Care Supplies  Strengths: Alert and oriented, Able to follow instructions, Supportive family and Pleasant and cooperative   Barriers:   Generalized weakness and Limited mobility       Nursing Problems     Problem: Bowel/Gastric:      Description:     Goal: Normal bowel function is maintained or improved     Description:           Goal: Will not experience complications related to bowel motility     Description:                 Problem: Communication     Description:     Goal: The ability to communicate needs accurately and effectively will improve     Description:                 Problem: Discharge Barriers/Planning     Description:     Goal: Patient's continuum of care needs will be met     Description:                 Problem: Infection     Description:     Goal: Will remain free from infection     Description:                 Problem: Knowledge Deficit     Description:     Goal: Knowledge of disease process/condition, treatment plan, diagnostic tests, and medications will improve     Description:           Goal: Knowledge of the prescribed therapeutic regimen will improve     Description:                 Problem: Pain Management     Description:     Goal: Pain level will decrease to patient's comfort goal     Description:                 Problem: Safety     Description:     Goal: Will remain free from injury     Description:           Goal: Will remain free from falls     Description:                 Problem: Venous Thromboembolism (VTW)/Deep Vein Thrombosis (DVT) Prevention:     Description:     Goal: Patient will participate in Venous Thrombosis (VTE)/Deep Vein Thrombosis (DVT)Prevention Measures     Description:                        Long Term Goals:   At discharge patient will be able to function safely at home and in the community with support.    Section completed by:  Tara Ingram R.N.           Mobility  Bed mobility: Mod I  Bed /Chair/Wheelchair Transfer Initial:  4 - Minimal Assistance  Bed /Chair/Wheelchair Transfer Current:  6 - Modified Independent   Bed/Chair/Wheelchair Transfer Description:  Adaptive equipment(Mod I reach pivot wc <> bed)  Walk Initial:  1 - Total Assistance  Walk Current:  2 - Max Assistance   Walk  "Description:  Requires incidental assist(CGA/SBA FWW indoors 80 ft x 2 + 125 ft )  Wheelchair Initial:  1 - Total Assistance  Wheelchair Current:  6 - Modified Independent   Wheelchair Description:  (Indoors)  Stairs Initial:  0 - Not tested,unsafe activity  Stairs Current: 1 - Total Assistance   Stairs Description: Extra time, Safety concerns, Verbal cueing, Hand rails, Supervision for safety, Ascends/descends less than 4 steps(Up/down 2\" step in // bars and CGA)  Patient/Family Training/Education: Safety with mobility, BP norms, residual limb care.  DME/DC Recommendations: Manual wc with pressure reducing wc cushion, and swing away stump board, FWW  Strengths:  Independent PLOF, Making steady progress towards goals, Manages pain appropriately, Motivated for self care and independence, Pleasant and cooperative, Supportive family and Willingly participates in therapeutic activities  Barriers:   Decreased endurance, Orthostatic hypotension, Limited mobility, Poor balance and Other: Low BP  # of short term goals set= 3  # of short term goals met= 3  Physical Therapy Problems     Problem: Mobility     Dates: Start: 01/06/20       Description:     Goal: STG-Within one week, patient will ambulate community distances     Dates: Start: 01/06/20       Description: 1) Individualized goal:  Patient will amb >150 ft with FWW indoors SPV, and mod I outdoors with wc mob  2) Interventions:  PT Group Therapy, PT E Stim Attended, PT Gait Training, PT Therapeutic Exercises, PT Neuro Re-Ed/Balance, PT Aquatic Therapy, PT Therapeutic Activity and PT Manual Therapy             Goal: STG-Within one week, patient will ambulate up/down a curb     Dates: Start: 01/06/20       Description: 1) Individualized goal:  Patient will amb up/down curb 2x with FWW CGA  2) Interventions:   PT Group Therapy, PT E Stim Attended, PT Gait Training, PT Therapeutic Exercises, PT Neuro Re-Ed/Balance, PT Aquatic Therapy, PT Therapeutic Activity and PT Manual " Therapy                   Problem: Mobility Transfers     Dates: Start: 01/06/20       Description:     Goal: STG-Within one week, patient will sit to stand     Dates: Start: 01/06/20       Description: 1) Individualized goal:  Patient will transfer mod I with FWW STS/SPT  2) Interventions:   PT Group Therapy, PT E Stim Attended, PT Gait Training, PT Therapeutic Exercises, PT Neuro Re-Ed/Balance, PT Aquatic Therapy, PT Therapeutic Activity and PT Manual Therapy                   Problem: PT-Long Term Goals     Dates: Start: 12/28/19       Description:     Goal: LTG-By discharge, patient will propel wheelchair     Dates: Start: 12/28/19       Description: 1) Individualized goal:  300 feet indoors/outdoors at Mod I level  2) Interventions:  PT Group Therapy, PT Prosthetic Training, PT Gait Training, PT Self Care/Home Eval, PT Therapeutic Exercises, PT TENS Application, PT Neuro Re-Ed/Balance, PT Therapeutic Activity and PT Manual Therapy           Goal: LTG-By discharge, patient will ambulate     Dates: Start: 12/28/19       Description: 1) Individualized goal:  with FWW for 25 feet indoors at SPV level  2) Interventions:  PT Group Therapy, PT Prosthetic Training, PT Gait Training, PT Self Care/Home Eval, PT Therapeutic Exercises, PT TENS Application, PT Neuro Re-Ed/Balance, PT Therapeutic Activity and PT Manual Therapy             Goal: LTG-By discharge, patient will transfer one surface to another     Dates: Start: 12/28/19       Description: 1) Individualized goal:  with FWW at Mod I level for bed transfers  2) Interventions:  PT Group Therapy, PT Prosthetic Training, PT Gait Training, PT Self Care/Home Eval, PT Therapeutic Exercises, PT TENS Application, PT Neuro Re-Ed/Balance, PT Therapeutic Activity and PT Manual Therapy               Goal: LTG-By discharge, patient will transfer in/out of a car     Dates: Start: 12/28/19       Description: 1) Individualized goal:  with FWW at SPV level for car transfers  2)  Interventions:  PT Group Therapy, PT Prosthetic Training, PT Gait Training, PT Self Care/Home Eval, PT Therapeutic Exercises, PT TENS Application, PT Neuro Re-Ed/Balance, PT Therapeutic Activity and PT Manual Therapy                           Section completed by:  Montse Cabrera PT, DPT    Activities of Daily Living  Eating Initial:  5 - Standby Prompting/Supervision or Set-up  Eating Current:  7 - Independent   Eating Description:     Grooming Initial:  5 - Standby Prompting/Supervision or Set-up  Grooming Current:  6 - Modified Independent   Grooming Description:  Increased time  Bathing Initial:  4 - Minimal Assistance  Bathing Current:  6 - Modified Independent   Bathing Description:  Grab bar, Tub bench, Hand held shower, Increased time  Upper Body Dressing Initial:  5 - Standby Prompting/Supervision or Set-up  Upper Body Dressing Current:  7 - Independent   Upper Body Dressing Description:  ( tank top and long sleeve shirt)  Lower Body Dressing Initial:  4 - Minimal Assistance  Lower Body Dressing Current:  6 - Modified Independent   Lower Body Dressing Description:  6 - Modified Independent  Toileting Initial:  2 - Max Assistance  Toileting Current:  6 - Modified Independent   Toileting Description:  Grab bar, Increased time  Toilet Transfer Initial:  4 - Minimal Assistance  Toilet Transfer Current:  5 - Standby Prompting/Supervision or Set-up   Toilet Transfer Description:  5 - Standby Prompting/Supervision or Set-up  Tub / Shower Transfer Initial:  4 - Minimal Assistance  Tub / Shower Transfer Current:  5 - Standby Prompting/Supervision or Set-up   Tub / Shower Transfer Description:  Supervision for safety, Verbal cueing(Verbal cues to use grab bar )  IADL:  Therapy has concentrated on ADLs due to precautions/BP   Family Training/Education:  Pt's wife has been trained on ADLs and functional transfers   DME/DC Recommendations:  Home Health OT      Strengths:  Able to follow instructions, Alert and oriented,  Effective communication skills, Good balance, Good carryover of learning, Good insight into deficits/needs, Independent PLOF, Making steady progress towards goals, Manages pain appropriately, Motivated for self care and independence, Pleasant and cooperative, Supportive family, Willingly participates in therapeutic activities and Other: Former olympic athlete   Barriers:  Decreased endurance, Orthostatic hypotension and Pressure ulcer     # of short term goals set= 4    # of short term goals met= 4, 2 goals revised to Mod I     Occupational Therapy Goals     Problem: Bathing     Dates: Start: 12/28/19       Description:     Goal: STG-Within one week, patient will bathe     Dates: Start: 12/28/19   Expected End: 01/04/20       Description: 1) Individualized Goal:  Mod I using AE/DME PRN   2) Interventions:  OT E Stim Attended, OT Group Therapy, OT Self Care/ADL, OT Cognitive Skill Dev, OT Community Reintegration, OT Manual Ther Technique, OT Neuro Re-Ed/Balance, OT Sensory Int Techniques, OT Therapeutic Activity, OT Evaluation and OT Therapeutic Exercise         Note:     Goal Note filed on 12/31/19 1135 by Savana Tobias MS,OTR/L    Requires SBA - Min A                        Problem: Dressing     Dates: Start: 12/28/19       Description:     Goal: STG-Within one week, patient will dress LB     Dates: Start: 12/28/19   Expected End: 01/04/20       Description: 1) Individualized Goal:  With Mod I using AE/DME PRN   2) Interventions:  OT E Stim Attended, OT Group Therapy, OT Self Care/ADL, OT Cognitive Skill Dev, OT Community Reintegration, OT Manual Ther Technique, OT Neuro Re-Ed/Balance, OT Sensory Int Techniques, OT Therapeutic Activity, OT Evaluation and OT Therapeutic Exercise         Note:     Goal Note filed on 12/31/19 1135 by Savana Tobias MS,OTR/L    Requires SBA - Min A                        Problem: Functional Transfers     Dates: Start: 12/28/19       Description:     Goal: STG-Within one  week, patient will transfer to toilet     Dates: Start: 12/28/19   Expected End: 01/04/20       Description: 1) Individualized Goal:  With SBA consistently with no cues for set-up/safety  2) Interventions:  OT E Stim Attended, OT Group Therapy, OT Self Care/ADL, OT Cognitive Skill Dev, OT Community Reintegration, OT Manual Ther Technique, OT Neuro Re-Ed/Balance, OT Sensory Int Techniques, OT Therapeutic Activity, OT Evaluation and OT Therapeutic Exercise        Note:     Goal Note filed on 12/31/19 1135 by Savana Tobias, MS,OTR/L    Requires SBA - Min A                        Problem: OT Long Term Goals     Dates: Start: 12/28/19       Description:     Goal: LTG-By discharge, patient will complete basic self care tasks     Dates: Start: 12/28/19   Expected End: 01/04/20       Description: 1) Individualized Goal:  With mod I  2) Interventions:  OT E Stim Attended, OT Group Therapy, OT Self Care/ADL, OT Cognitive Skill Dev, OT Community Reintegration, OT Manual Ther Technique, OT Neuro Re-Ed/Balance, OT Sensory Int Techniques, OT Therapeutic Activity, OT Evaluation and OT Therapeutic Exercise                 Problem: Toileting     Dates: Start: 12/28/19       Description:     Goal: STG-Within one week, patient will complete toileting tasks     Dates: Start: 12/28/19   Expected End: 01/04/20       Description: 1) Individualized Goal:  With SBA consistently with no cues for set-up/safety  2) Interventions:  OT E Stim Attended, OT Group Therapy, OT Self Care/ADL, OT Cognitive Skill Dev, OT Community Reintegration, OT Manual Ther Technique, OT Neuro Re-Ed/Balance, OT Sensory Int Techniques, OT Therapeutic Activity, OT Evaluation and OT Therapeutic Exercise        Note:     Goal Note filed on 12/31/19 1135 by Savana Tobias, MS,OTR/L    Requires SBA - Min A                              Section completed by:  SRIRAM Pete, OTR/L          Nutrition  Dietary Problems (Active)      There are no active  problems.            Nutrition services: Day 5 of admit.  Israel Aviles is a 54 y.o. male with admitting DX of 05.4 unilateral LE below knee amputation (bka)  S/P BKA (below knee amputation) unilateral, left     Consult received for wound     Visit with pt at bedside. Pt agreeable to high protein snacks, double protein portions, Boost GC when blood glucose improves. Wife also bringing food from home. Pt reports normal appetite, weight stable.     Assessment:  Height: 182.9 cm (6')  Weight: 68.5 kg (151 lb 0.2 oz)  Body mass index is 20.48 kg/m². Adjusted BMI for BKA: 21.6, BMI classification: Normal  Diet/Intake: Diabetic, generally %     Evaluation:   1. Unstageable pressure injury - heel  2. Labs: Glu: 109; POC Glu 150-226  3. Meds: Glipizide, Metformin, Vit D     Malnutrition Risk: Increased protein needs for wound healing.      Recommendations/Plan:  1. High protein snacks BID, double protein portions  2. Vitamin therapy for wound healing: Multivitamin with minerals QD, Vitamin C 500 mg BID   3. Encourage intake of meals  4. Document intake of all meals as % taken in ADL's to provide interdisciplinary communication across all shifts.   5. Monitor weight.  6. Nutrition rep will continue to see patient for ongoing meal and snack preferences.      RD following weekly    Section completed by:  Collette Neal R.D.    REHAB-Pharmacy IDT Team Note by Alexis Russ RPH at 1/6/2020  5:15 PM  Version 1 of 1    Author:  Alexis Russ RPH Service:  -- Author Type:  Pharmacist    Filed:  1/6/2020  5:16 PM Date of Service:  1/6/2020  5:15 PM Status:  Signed    :  Alexis Russ RPH (Pharmacist)         Pharmacy   Pharmacy  Antibiotics/Antifungals/Antivirals:  Medication:      Active Orders (From admission, onward)    None        Route:        NA  Stop Date:  NA  Reason:      NA  Antihypertensives/Cardiac:  Medication:    Orders (72h ago, onward)     Start     Ordered    01/05/20 1300   midodrine (PROAMATINE) tablet 2.5 mg  2 TIMES DAILY      01/05/20 1001    12/30/19 0600  spironolactone (ALDACTONE) tablet 12.5 mg  Q DAY,   Status:  Discontinued      12/29/19 1104    12/27/19 2100  atorvastatin (LIPITOR) tablet 80 mg  NIGHTLY      12/27/19 1644    12/27/19 1644  hydrALAZINE (APRESOLINE) tablet 25 mg  EVERY 8 HOURS PRN      12/27/19 1644              Patient Vitals for the past 24 hrs:   BP Pulse   01/06/20 1330 (!) 98/64 99   01/06/20 1129 117/77 --   01/06/20 1031 (!) 92/55 100   01/06/20 0931 102/64 --   01/06/20 0700 103/72 94   01/06/20 0651 110/75 92   01/05/20 1925 -- (!) 104   01/05/20 1850 108/77 (!) 106     Anticoagulation:  Medication: Aspirin, Plavix                      Other key medications: A review of the medication list reveals no issues at this time. Patient is currently on antihypertensive(s). Recommend home blood pressure monitoring/recording if antihypertensive(s) regimen(s) continue. Patient is currently on diabetic medication(s) and/or Insulin(s). Recommend home blood glucose monitoring/recording if these regimen(s) continue.    Section completed by: Alexis Russ [facility][AW.1]     Attribution Key     AW.1 - Alexis Russ [facility] on 1/6/2020  5:15 PM                  DC Planning  DC destination/dispostion:  Patient lives with his wife in a [place]le level home.  He has 2 entry steps so he may need a ramp.    DC Needs: He has follow up scheduled with cardiology.  He will need follow up with his ortho surgeon.  He will likely need a w/c, stump board and walker.  I will follow for recommendations for his therapy follow up level.    Barriers to discharge:       Strengths: good support.    Section completed by:  Peggy Sigala R.N.      Physician Summary  MARITZA Bai MD  participated and led team conference discussion.

## 2020-01-08 LAB
ANION GAP SERPL CALC-SCNC: 9 MMOL/L (ref 0–11.9)
BUN SERPL-MCNC: 37 MG/DL (ref 8–22)
CALCIUM SERPL-MCNC: 9.1 MG/DL (ref 8.5–10.5)
CHLORIDE SERPL-SCNC: 103 MMOL/L (ref 96–112)
CO2 SERPL-SCNC: 23 MMOL/L (ref 20–33)
CREAT SERPL-MCNC: 0.58 MG/DL (ref 0.5–1.4)
ERYTHROCYTE [DISTWIDTH] IN BLOOD BY AUTOMATED COUNT: 68.6 FL (ref 35.9–50)
GLUCOSE BLD-MCNC: 129 MG/DL (ref 65–99)
GLUCOSE BLD-MCNC: 149 MG/DL (ref 65–99)
GLUCOSE BLD-MCNC: 152 MG/DL (ref 65–99)
GLUCOSE BLD-MCNC: 198 MG/DL (ref 65–99)
GLUCOSE SERPL-MCNC: 137 MG/DL (ref 65–99)
GRAM STN SPEC: NORMAL
HCT VFR BLD AUTO: 33 % (ref 42–52)
HGB BLD-MCNC: 10.4 G/DL (ref 14–18)
MCH RBC QN AUTO: 28.8 PG (ref 27–33)
MCHC RBC AUTO-ENTMCNC: 31.5 G/DL (ref 33.7–35.3)
MCV RBC AUTO: 91.4 FL (ref 81.4–97.8)
PLATELET # BLD AUTO: 247 K/UL (ref 164–446)
PMV BLD AUTO: 9.7 FL (ref 9–12.9)
POTASSIUM SERPL-SCNC: 4.2 MMOL/L (ref 3.6–5.5)
RBC # BLD AUTO: 3.61 M/UL (ref 4.7–6.1)
SIGNIFICANT IND 70042: NORMAL
SITE SITE: NORMAL
SODIUM SERPL-SCNC: 135 MMOL/L (ref 135–145)
SOURCE SOURCE: NORMAL
WBC # BLD AUTO: 5.5 K/UL (ref 4.8–10.8)

## 2020-01-08 PROCEDURE — A9270 NON-COVERED ITEM OR SERVICE: HCPCS | Performed by: HOSPITALIST

## 2020-01-08 PROCEDURE — 99232 SBSQ HOSP IP/OBS MODERATE 35: CPT | Performed by: HOSPITALIST

## 2020-01-08 PROCEDURE — 770010 HCHG ROOM/CARE - REHAB SEMI PRIVAT*

## 2020-01-08 PROCEDURE — 700102 HCHG RX REV CODE 250 W/ 637 OVERRIDE(OP): Performed by: HOSPITALIST

## 2020-01-08 PROCEDURE — 80048 BASIC METABOLIC PNL TOTAL CA: CPT

## 2020-01-08 PROCEDURE — 85027 COMPLETE CBC AUTOMATED: CPT

## 2020-01-08 PROCEDURE — A9270 NON-COVERED ITEM OR SERVICE: HCPCS | Performed by: PHYSICAL MEDICINE & REHABILITATION

## 2020-01-08 PROCEDURE — 99232 SBSQ HOSP IP/OBS MODERATE 35: CPT | Performed by: PHYSICAL MEDICINE & REHABILITATION

## 2020-01-08 PROCEDURE — 97530 THERAPEUTIC ACTIVITIES: CPT

## 2020-01-08 PROCEDURE — 36415 COLL VENOUS BLD VENIPUNCTURE: CPT

## 2020-01-08 PROCEDURE — 700102 HCHG RX REV CODE 250 W/ 637 OVERRIDE(OP): Performed by: PHYSICAL MEDICINE & REHABILITATION

## 2020-01-08 PROCEDURE — 97110 THERAPEUTIC EXERCISES: CPT

## 2020-01-08 PROCEDURE — 82962 GLUCOSE BLOOD TEST: CPT | Mod: 91

## 2020-01-08 PROCEDURE — 97116 GAIT TRAINING THERAPY: CPT

## 2020-01-08 RX ADMIN — MULTIPLE VITAMINS W/ MINERALS TAB 1 TABLET: TAB at 08:29

## 2020-01-08 RX ADMIN — OXYCODONE HYDROCHLORIDE AND ACETAMINOPHEN 500 MG: 500 TABLET ORAL at 20:27

## 2020-01-08 RX ADMIN — METFORMIN HYDROCHLORIDE 1000 MG: 500 TABLET ORAL at 07:20

## 2020-01-08 RX ADMIN — GLIPIZIDE 10 MG: 5 TABLET ORAL at 17:02

## 2020-01-08 RX ADMIN — FERROUS SULFATE TAB 325 MG (65 MG ELEMENTAL FE) 325 MG: 325 (65 FE) TAB at 07:20

## 2020-01-08 RX ADMIN — ATORVASTATIN CALCIUM 80 MG: 40 TABLET, FILM COATED ORAL at 20:27

## 2020-01-08 RX ADMIN — ASPIRIN 81 MG: 81 TABLET, COATED ORAL at 08:29

## 2020-01-08 RX ADMIN — INSULIN LISPRO 2 UNITS: 100 INJECTION, SOLUTION INTRAVENOUS; SUBCUTANEOUS at 17:03

## 2020-01-08 RX ADMIN — GLIPIZIDE 10 MG: 5 TABLET ORAL at 07:20

## 2020-01-08 RX ADMIN — CLOPIDOGREL BISULFATE 75 MG: 75 TABLET ORAL at 20:27

## 2020-01-08 RX ADMIN — METFORMIN HYDROCHLORIDE 1000 MG: 500 TABLET ORAL at 17:02

## 2020-01-08 RX ADMIN — VITAMIN D, TAB 1000IU (100/BT) 1000 UNITS: 25 TAB at 08:29

## 2020-01-08 RX ADMIN — INSULIN LISPRO 2 UNITS: 100 INJECTION, SOLUTION INTRAVENOUS; SUBCUTANEOUS at 21:10

## 2020-01-08 RX ADMIN — OXYCODONE HYDROCHLORIDE AND ACETAMINOPHEN 500 MG: 500 TABLET ORAL at 08:30

## 2020-01-08 ASSESSMENT — ENCOUNTER SYMPTOMS
NAUSEA: 0
BRUISES/BLEEDS EASILY: 0
PALPITATIONS: 0
EYES NEGATIVE: 1
ABDOMINAL PAIN: 0
CHILLS: 0
COUGH: 0
SHORTNESS OF BREATH: 0
FEVER: 0
VOMITING: 0

## 2020-01-08 NOTE — THERAPY
Occupational Therapy  Daily Treatment     Patient Name: Israel Aviles  Age:  54 y.o., Sex:  male  Medical Record #: [id number]  Today's Date: 1/8/2020     Precautions  Precautions: Non Weight Bearing Left Lower Extremity, Fall Risk, Other (See Comments)  Comments: MDRO Contact precautions, BKA LLE, hypotension    Safety   ADL Safety : Modified Independent  Bathroom Safety: Modified Independent  Comments: Supervision due to variable BP and orthostatic hypotension     Subjective       Objective       01/08/20 1401   Precautions   Precautions Non Weight Bearing Left Lower Extremity;Fall Risk;Other (See Comments)   Comments MDRO Contact precautions, BKA LLE, hypotension   Vitals   Blood Pressure 115/73   Respiration 17   Pulse Oximetry 100 %   O2 Delivery None (Room Air)   Vitals Comments Above is pre-tx vital, Post ther ex vitals were 135/78,    Non Verbal Descriptors   Non Verbal Scale  Calm   Sitting Upper Body Exercises   Chest Press 3 sets of 10  (10#)   Shoulder Press 3 sets of 10  (10#)   Standing Upper Body Exercises   Comments Standing at [employer], Level 5, 15 mins, seated rest break x 8, 4.541km.  No LOB, noted fatigue w/ seated rest break every 2 mins   Interdisciplinary Plan of Care Collaboration   Patient Position at End of Therapy Seated;Call Light within Reach;Tray Table within Reach;Phone within Reach   OT Total Time Spent   OT Individual Total Time Spent (Mins) 60   OT Charge Group   OT Therapeutic Exercise  4       Assessment    Pt was alert and cooperative w/ tx.  Limiters include poor endurance and generalized weakness.  Pt required minimum of 2 min seated rest break for 2 mins standing there ex at [employer] - see notes above.    Plan    Continue OT to address ADL/IADL independence, endurance, functional transfers, and BUE strength. Progress to ADLs with FWW

## 2020-01-08 NOTE — PROGRESS NOTES
Hospital Medicine Daily Progress Note      Chief Complaint  CHF, HTN, DM    Interval Problem Update  Pt's SBP in the low 100's this morning, denies dizziness.    Review of Systems  Review of Systems   Constitutional: Negative for chills and fever.   HENT: Negative.    Eyes: Negative.    Respiratory: Negative for cough and shortness of breath.    Cardiovascular: Negative for chest pain and palpitations.   Gastrointestinal: Negative for abdominal pain, nausea and vomiting.   Genitourinary: Negative.    Musculoskeletal:        Wound pain   Skin: Negative for itching and rash.   Endo/Heme/Allergies: Does not bruise/bleed easily.        Physical Exam  Temp:  [36.4 °C (97.6 °F)-36.7 °C (98 °F)] 36.4 °C (97.6 °F)  Pulse:  [] 98  Resp:  [16-18] 16  BP: ()/(54-88) 100/65  SpO2:  [92 %-100 %] 100 %    Physical Exam  Vitals signs reviewed.   Constitutional:       General: He is not in acute distress.     Appearance: Normal appearance. He is not ill-appearing.   HENT:      Head: Normocephalic and atraumatic.      Right Ear: External ear normal.      Left Ear: External ear normal.      Nose: Nose normal.   Eyes:      General:         Right eye: No discharge.         Left eye: No discharge.      Extraocular Movements: Extraocular movements intact.      Conjunctiva/sclera: Conjunctivae normal.   Neck:      Musculoskeletal: Normal range of motion and neck supple.   Cardiovascular:      Rate and Rhythm: Normal rate and regular rhythm.   Pulmonary:      Effort: Pulmonary effort is normal. No respiratory distress.      Breath sounds: Normal breath sounds. No wheezing.   Abdominal:      General: Abdomen is flat. Bowel sounds are normal.      Palpations: Abdomen is soft.   Musculoskeletal:      Comments: Left BKA dressed, right heel wound photos reviewed   Skin:     General: Skin is warm and dry.   Neurological:      General: No focal deficit present.      Mental Status: He is alert and oriented to person, place, and time.          Fluids    Intake/Output Summary (Last 24 hours) at 1/8/2020 1258  Last data filed at 1/7/2020 2037  Gross per 24 hour   Intake 480 ml   Output --   Net 480 ml       Laboratory  Recent Labs     01/08/20  0548   WBC 5.5   RBC 3.61*   HEMOGLOBIN 10.4*   HEMATOCRIT 33.0*   MCV 91.4   MCH 28.8   MCHC 31.5*   RDW 68.6*   PLATELETCT 247   MPV 9.7     Recent Labs     01/08/20  0548   SODIUM 135   POTASSIUM 4.2   CHLORIDE 103   CO2 23   GLUCOSE 137*   BUN 37*   CREATININE 0.58   CALCIUM 9.1                   Assessment/Plan  * S/P BKA (below knee amputation) unilateral, left (HCC)  Assessment & Plan  Left foot osteomyelitis S/P BKA on 12/20/19 by Dr. Zhao  Completed intravenous antimicrobial therapy per Infectious Diseases  Wound care and pain control    Non-healing wound of right heel  Assessment & Plan  S/P wound debridement w/ exposed bone  Wound GS show few WBC and rare GNR/yeast  Wound Cx pending but suspect will need 6 wks of IV abx  ID consult once microbiology results finalized    Azotemia  Assessment & Plan  Now off Aldactone  Continue to encourage PO fluids  May need IVF    HTN (hypertension)  Assessment & Plan  Off Valsartan, Coreg, and Aldactone for hypotension  Suspect pt's premorbid baseline BP's in the 90's range  Will taper off Midodrine    CAD (coronary artery disease)  Assessment & Plan  S/P NSTEMI w/ Echo showing EF 45%  Has three vessel coronary artery disease per Cardiac Cath on 12/16/19 w/ recommendations for medical management  Off ARB, B-Bl, and diuretic due to hypotension and azotemia  Continue ASA, Plavix, and Lipitor  Outpt Cardiology F/U    Vitamin D deficiency  Assessment & Plan  Vit D level 20  Continue supplementation    Normocytic anemia- (present on admission)  Assessment & Plan  Pt Hb dropped 3 g between June 2018 and June 2019  Continue Fe supplements for low iron stores  Consider outpt GI consult in this 53 yo male w/ a fairly rapid drop in H/H due to CURTIS    Uncontrolled type 2  diabetes mellitus with hyperglycemia (HCC)- (present on admission)  Assessment & Plan  HbA1c 8.5  On Metformin  1000 mg bid and Glipizide 10 mg bid  Outpt meds include Metformin 1000 mg bid and Glipizide 5 mg bid    PVD (peripheral vascular disease) (HCC)  Assessment & Plan  Has h/o prior LLE revascularization  On ASA, Plavix, and Lipitor  Outpt Vascular F/U    Full Code    Reviewed w/ pt and Dr. Bai

## 2020-01-08 NOTE — THERAPY
"Physical Therapy   Daily Treatment     Patient Name: Israel Aviles  Age:  54 y.o., Sex:  male  Medical Record #: [id number]  Today's Date: 1/7/2020     Precautions  Precautions: (P) Non Weight Bearing Left Lower Extremity, Fall Risk  Comments: (P) Low BP    Subjective    Patient reported fatigue from previous therapies, and preferred to practice curb tomorrow.      Objective       01/07/20 1531   Precautions   Precautions Non Weight Bearing Left Lower Extremity;Fall Risk   Comments Low BP   Vitals   Pulse 100   Blood Pressure (!) 94/54   Supine Lower Body Exercise   Other Exercises Prone ham curls 15 x 2 BLE. Hip ext LLE 10 x 2 sets.    Bed Mobility    Supine to Sit Modified Independent   Sit to Supine Modified Independent   Scooting Modified Independent   Rolling Modified Independent   Neuro-Muscular Treatments   Neuro-Muscular Treatments Weight Shift Left;Weight Shift Right   Comments SPV sup <> prone; prone x 8 min for hip flexor stretch. Residual limb wrapping with 2 wraps, figure 8 method, and patellar lock.  Patient participated in wrapping 2x with mod A.    PT Total Time Spent   PT Individual Total Time Spent (Mins) 30   PT Charge Group   PT Neuromuscular Re-Education / Balance 1   PT Therapeutic Activities 1       Assessment    Patient progressed to self wrapping, but required min/mod A for optimal shaping/ tension. Patient demonstrated recall of HEP, and ROM remains intact.    Plan    Family training, car transfer training with patient's vehicle, D/C data collection (D/C pending results of wound culture on R heel), 6\" curb training with FWW.    "

## 2020-01-08 NOTE — THERAPY
Occupational Therapy  Daily Treatment     Patient Name: Israel Aviles  Age:  54 y.o., Sex:  male  Medical Record #: [id number]  Today's Date: 1/8/2020     Precautions  Precautions: Non Weight Bearing Left Lower Extremity, Fall Risk, Other (See Comments)  Comments: MDRO Contact precautions, BKA LLE, hypotension    Safety   ADL Safety : Modified Independent  Bathroom Safety: Modified Independent  Comments: Supervision due to variable BP and orthostatic hypotension     Subjective    Pt was seated on bed and agreeable to therapy.      Objective       01/08/20 0831   Precautions   Precautions Non Weight Bearing Left Lower Extremity;Fall Risk;Other (See Comments)   Comments MDRO Contact precautions, BKA LLE, hypotension   Safety    ADL Safety  Modified Independent   Bathroom Safety Modified Independent   Vitals   Patient BP Position Sitting   Blood Pressure 135/88   Vitals Comments /78 during activity without abdominal binder    Sitting Upper Body Exercises   Upper Extremity Bike Minutes / Rest Breaks (See Comments)  (Hydrobike: Level 10 for 10 mins and level 5 for 5 mins, 1.3 )   Other Exercise Hydrobike seated as above    Interdisciplinary Plan of Care Collaboration   Patient Position at End of Therapy Seated;Self Releasing Lap Belt Applied;Tray Table within Reach  (PT to take over)   OT Total Time Spent   OT Individual Total Time Spent (Mins) 30   OT Charge Group   OT Therapeutic Exercise  2       Pt self-propelled w/c over 250 ft from room to west gym with SBA-Mod I     Assessment    Pt tolerated therapeutic exercise well without complaint of pain or fatigue and maintaining stable BP. Pt barriers include NWB LLE, orthostatic hypotension, and limited endurance.      Plan     Continue OT to address ADL/IADL independence, endurance, functional transfers, and BUE strength. Progress to ADLs with FWW

## 2020-01-08 NOTE — CARE PLAN
Problem: Pain Management  Goal: Pain level will decrease to patient's comfort goal  Note:   Pt is calm, comfortable and stable.Repositioned with pillows for comfort.Will continue to monitor and assess pain level and medicate as needed.     Problem: Acute Care of the Diabetic Patient  Goal: Optimal Outcome for the Diabetic Patient  Intervention: Monitor Glucose levels per MD Orders  Note:   FSBS 179 at hs.Coverage given with snack.Will continue to monitor.

## 2020-01-08 NOTE — PROGRESS NOTES
Rehab Progress Note     Encounter Date: 1/8/2020    CC: LLE BKA, weakness, DM    Interval Events (Subjective)  Patient sitting up in room. He reports he is doing well. He reports therapy is going well. Discussed that we are trying to arrange his follow-up as Dr. Chatterjee would like to see him as well as Dr. Zhao.  Discussed that his cultures are still pending and we need a plan for antibiotics if needed before going home. He reports his blood pressure was very good this morning so he declined the midodrine.  Discussed that he no longer needs it if he is able to get up with therapy. Denies pain. Denies dizziness.     IDT Team Meeting 1/7/2020  DC/Disposition:  TBD    Objective:  VITAL SIGNS: /65   Pulse 98   Temp 36.4 °C (97.6 °F) (Oral)   Resp 16   Ht 1.829 m (6')   Wt 70.5 kg (155 lb 6.8 oz)   SpO2 100%   BMI 21.08 kg/m²   Gen: NAD  Psych: Mood and affect appropriate  CV: RRR, no edema  Resp: CTAB, no upper airway sounds  Abd: NTND  Neuro: AOx4, 5/5 BUE    Recent Results (from the past 72 hour(s))   ACCU-CHEK GLUCOSE    Collection Time: 01/05/20  5:02 PM   Result Value Ref Range    Glucose - Accu-Ck 216 (H) 65 - 99 mg/dL   ACCU-CHEK GLUCOSE    Collection Time: 01/05/20  8:47 PM   Result Value Ref Range    Glucose - Accu-Ck 195 (H) 65 - 99 mg/dL   ACCU-CHEK GLUCOSE    Collection Time: 01/06/20  8:02 AM   Result Value Ref Range    Glucose - Accu-Ck 166 (H) 65 - 99 mg/dL   ACCU-CHEK GLUCOSE    Collection Time: 01/06/20 11:03 AM   Result Value Ref Range    Glucose - Accu-Ck 160 (H) 65 - 99 mg/dL   ACCU-CHEK GLUCOSE    Collection Time: 01/06/20  5:06 PM   Result Value Ref Range    Glucose - Accu-Ck 182 (H) 65 - 99 mg/dL   ACCU-CHEK GLUCOSE    Collection Time: 01/06/20  9:01 PM   Result Value Ref Range    Glucose - Accu-Ck 244 (H) 65 - 99 mg/dL   ACCU-CHEK GLUCOSE    Collection Time: 01/07/20  7:32 AM   Result Value Ref Range    Glucose - Accu-Ck 166 (H) 65 - 99 mg/dL   ACCU-CHEK GLUCOSE    Collection Time:  01/07/20 11:16 AM   Result Value Ref Range    Glucose - Accu-Ck 118 (H) 65 - 99 mg/dL   GRAM STAIN    Collection Time: 01/07/20  1:25 PM   Result Value Ref Range    Significant Indicator .     Source WND     Site RIGHT ANKLE     Gram Stain Result Few WBCs.  Rare Gram negative rods.  Rare Yeast.      ACCU-CHEK GLUCOSE    Collection Time: 01/07/20  5:09 PM   Result Value Ref Range    Glucose - Accu-Ck 203 (H) 65 - 99 mg/dL   ACCU-CHEK GLUCOSE    Collection Time: 01/07/20  8:31 PM   Result Value Ref Range    Glucose - Accu-Ck 179 (H) 65 - 99 mg/dL   CBC WITHOUT DIFFERENTIAL    Collection Time: 01/08/20  5:48 AM   Result Value Ref Range    WBC 5.5 4.8 - 10.8 K/uL    RBC 3.61 (L) 4.70 - 6.10 M/uL    Hemoglobin 10.4 (L) 14.0 - 18.0 g/dL    Hematocrit 33.0 (L) 42.0 - 52.0 %    MCV 91.4 81.4 - 97.8 fL    MCH 28.8 27.0 - 33.0 pg    MCHC 31.5 (L) 33.7 - 35.3 g/dL    RDW 68.6 (H) 35.9 - 50.0 fL    Platelet Count 247 164 - 446 K/uL    MPV 9.7 9.0 - 12.9 fL   Basic Metabolic Panel    Collection Time: 01/08/20  5:48 AM   Result Value Ref Range    Sodium 135 135 - 145 mmol/L    Potassium 4.2 3.6 - 5.5 mmol/L    Chloride 103 96 - 112 mmol/L    Co2 23 20 - 33 mmol/L    Glucose 137 (H) 65 - 99 mg/dL    Bun 37 (H) 8 - 22 mg/dL    Creatinine 0.58 0.50 - 1.40 mg/dL    Calcium 9.1 8.5 - 10.5 mg/dL    Anion Gap 9.0 0.0 - 11.9   ESTIMATED GFR    Collection Time: 01/08/20  5:48 AM   Result Value Ref Range    GFR If African American >60 >60 mL/min/1.73 m 2    GFR If Non African American >60 >60 mL/min/1.73 m 2   ACCU-CHEK GLUCOSE    Collection Time: 01/08/20  7:20 AM   Result Value Ref Range    Glucose - Accu-Ck 129 (H) 65 - 99 mg/dL   ACCU-CHEK GLUCOSE    Collection Time: 01/08/20 10:40 AM   Result Value Ref Range    Glucose - Accu-Ck 149 (H) 65 - 99 mg/dL       Current Facility-Administered Medications   Medication Frequency   • insulin lispro (HUMALOG) injection 2-12 Units 4X/DAY ACHS   • glipiZIDE (GLUCOTROL) tablet 10 mg BID AC   •  senna-docusate (PERICOLACE or SENOKOT S) 8.6-50 MG per tablet 2 Tab BID PRN    And   • polyethylene glycol/lytes (MIRALAX) PACKET 1 Packet QDAY PRN    And   • magnesium hydroxide (MILK OF MAGNESIA) suspension 30 mL QDAY PRN    And   • bisacodyl (DULCOLAX) suppository 10 mg QDAY PRN   • therapeutic multivitamin-minerals (THERAGRAN-M) tablet 1 Tab DAILY   • ascorbic acid tablet 500 mg BID   • glucose 4 g chewable tablet 16 g Q15 MIN PRN    And   • dextrose 50% (D50W) injection 50 mL Q15 MIN PRN   • vitamin D (cholecalciferol) 1000 UNIT tablet 1,000 Units DAILY   • Respiratory Care per Protocol Continuous RT   • hydrALAZINE (APRESOLINE) tablet 25 mg Q8HRS PRN   • acetaminophen (TYLENOL) tablet 650 mg Q4HRS PRN   • artificial tears ophthalmic solution 1 Drop PRN   • benzocaine-menthol (CEPACOL) lozenge 1 Lozenge Q2HRS PRN   • mag hydrox-al hydrox-simeth (MAALOX PLUS ES or MYLANTA DS) suspension 20 mL Q2HRS PRN   • ondansetron (ZOFRAN ODT) dispertab 4 mg 4X/DAY PRN    Or   • ondansetron (ZOFRAN) syringe/vial injection 4 mg 4X/DAY PRN   • traZODone (DESYREL) tablet 50 mg QHS PRN   • sodium chloride (OCEAN) 0.65 % nasal spray 2 Spray PRN   • aspirin EC (ECOTRIN) tablet 81 mg DAILY   • atorvastatin (LIPITOR) tablet 80 mg Nightly   • clopidogrel (PLAVIX) tablet 75 mg QHS   • ferrous sulfate tablet 325 mg QDAY with Breakfast   • metFORMIN (GLUCOPHAGE) tablet 1,000 mg BID WITH MEALS   • oxyCODONE-acetaminophen (PERCOCET) 5-325 MG per tablet 1-2 Tab Q4HRS PRN       Orders Placed This Encounter   Procedures   • Diet Order Diabetic     Standing Status:   Standing     Number of Occurrences:   1     Order Specific Question:   Diet:     Answer:   Diabetic [3]     Order Specific Question:   Miscellaneous modifications:     Answer:   Vegetarian [13]       Assessment:  Active Hospital Problems    Diagnosis   • *S/P BKA (below knee amputation) unilateral, left (HCC)   • PVD (peripheral vascular disease) ([facility])   • Vitamin D deficiency    • CAD (coronary artery disease)   • HTN (hypertension)   • Normocytic anemia   • Diabetic polyneuropathy associated with type 2 diabetes mellitus (HCC)   • Hyperlipidemia   • Uncontrolled type 2 diabetes mellitus with hyperglycemia (HCC)       Medical Decision Making and Plan:  L BKA - Patient with 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 Zhao on 12/20/19.  -PT and OT for mobility and ADLs  -NWB LLE. IPOP to LLE     R ankle wound - Patient with ankle wound being managed by wound consult. With exposed tissue including bone after debridement.  -Recommending outpatient clinic visit with Dr. Chatterjee. Consult ID for antibiotic recommendation. Discharge delayed,   -Wound culture ordered 1/7/20 - gram stain with few gram negative rods and rare yeast. Pending culture    Neuropathic pain - Patient would prefer no medications. PT working on desensitization and mirror therapy.     DM - Patient on Glipizide 5 mg qAM, Metformin 1000 mg BID and fingersticks   -Consult Hospitalist. Discontinued Lantus and increase Glipizide. Stopped Januvia per hospitalist     HTN/CV/PAD - Patient on ASA 81 mg and Plavix 75 QPM. Patient with recent TTE with EF of 40%. Patient on Coreg 6.25 mg, Spironolactone 25 mg daily, and Valsartan 40 mg BID  -Ongoing Hypotension, discussed with hospitalist. Decrease Spironolactone 12.5 mg daily. Discontinue Valsartan. Coreg discontinued, now with tachycardia   -Start Abdominal binder. Discontinue Spironolactone. Midodrine reduced to 2.5 mg daily and then trial of discontinue     HLD - Patient on Atorvastatin 80 mg QHS     Anemia - Patient on Fe supplement on transfer. Check AM CBC - 10 on admission.      Vitamin D - deficient on admission. Start 1000 U     DVT Ppx - Patient on Heparin on transfer. Hopping > 100 feet, discontinue Heparin.      Dispo - Patient's discharge delayed at this time pending follow-up and possible need for antibiotics.     Total time:  26 minutes.  I spent greater  than 50% of the time for patient care, counseling, and coordination on this date, including unit/floor time, and face-to-face time with the patient as per interval events and assessment and plan above. Topics discussed included discharge planning, gram stain ambiguous, and cultures pending. Discussed with patient that discharge for tomorrow will be delayed.      Oliverio Bai M.D.

## 2020-01-09 LAB
GLUCOSE BLD-MCNC: 130 MG/DL (ref 65–99)
GLUCOSE BLD-MCNC: 134 MG/DL (ref 65–99)
GLUCOSE BLD-MCNC: 191 MG/DL (ref 65–99)
GLUCOSE BLD-MCNC: 205 MG/DL (ref 65–99)

## 2020-01-09 PROCEDURE — 700102 HCHG RX REV CODE 250 W/ 637 OVERRIDE(OP): Performed by: HOSPITALIST

## 2020-01-09 PROCEDURE — A9270 NON-COVERED ITEM OR SERVICE: HCPCS | Performed by: HOSPITALIST

## 2020-01-09 PROCEDURE — 82962 GLUCOSE BLOOD TEST: CPT | Mod: 91

## 2020-01-09 PROCEDURE — 99232 SBSQ HOSP IP/OBS MODERATE 35: CPT | Performed by: HOSPITALIST

## 2020-01-09 PROCEDURE — 97530 THERAPEUTIC ACTIVITIES: CPT

## 2020-01-09 PROCEDURE — 99233 SBSQ HOSP IP/OBS HIGH 50: CPT | Performed by: PHYSICAL MEDICINE & REHABILITATION

## 2020-01-09 PROCEDURE — 700102 HCHG RX REV CODE 250 W/ 637 OVERRIDE(OP): Performed by: PHYSICAL MEDICINE & REHABILITATION

## 2020-01-09 PROCEDURE — 770010 HCHG ROOM/CARE - REHAB SEMI PRIVAT*

## 2020-01-09 PROCEDURE — A9270 NON-COVERED ITEM OR SERVICE: HCPCS | Performed by: PHYSICAL MEDICINE & REHABILITATION

## 2020-01-09 PROCEDURE — 97116 GAIT TRAINING THERAPY: CPT

## 2020-01-09 PROCEDURE — 97110 THERAPEUTIC EXERCISES: CPT

## 2020-01-09 RX ADMIN — GLIPIZIDE 10 MG: 5 TABLET ORAL at 18:02

## 2020-01-09 RX ADMIN — CLOPIDOGREL BISULFATE 75 MG: 75 TABLET ORAL at 21:06

## 2020-01-09 RX ADMIN — ATORVASTATIN CALCIUM 80 MG: 40 TABLET, FILM COATED ORAL at 21:06

## 2020-01-09 RX ADMIN — FERROUS SULFATE TAB 325 MG (65 MG ELEMENTAL FE) 325 MG: 325 (65 FE) TAB at 08:55

## 2020-01-09 RX ADMIN — GLIPIZIDE 10 MG: 5 TABLET ORAL at 08:55

## 2020-01-09 RX ADMIN — VITAMIN D, TAB 1000IU (100/BT) 1000 UNITS: 25 TAB at 08:54

## 2020-01-09 RX ADMIN — METFORMIN HYDROCHLORIDE 1000 MG: 500 TABLET ORAL at 08:54

## 2020-01-09 RX ADMIN — OXYCODONE HYDROCHLORIDE AND ACETAMINOPHEN 500 MG: 500 TABLET ORAL at 21:05

## 2020-01-09 RX ADMIN — OXYCODONE HYDROCHLORIDE AND ACETAMINOPHEN 500 MG: 500 TABLET ORAL at 08:55

## 2020-01-09 RX ADMIN — METFORMIN HYDROCHLORIDE 1000 MG: 500 TABLET ORAL at 18:02

## 2020-01-09 RX ADMIN — INSULIN LISPRO 4 UNITS: 100 INJECTION, SOLUTION INTRAVENOUS; SUBCUTANEOUS at 17:56

## 2020-01-09 RX ADMIN — ASPIRIN 81 MG: 81 TABLET, COATED ORAL at 08:54

## 2020-01-09 RX ADMIN — INSULIN LISPRO 2 UNITS: 100 INJECTION, SOLUTION INTRAVENOUS; SUBCUTANEOUS at 21:08

## 2020-01-09 RX ADMIN — MULTIPLE VITAMINS W/ MINERALS TAB 1 TABLET: TAB at 08:55

## 2020-01-09 ASSESSMENT — ENCOUNTER SYMPTOMS
CHILLS: 0
SHORTNESS OF BREATH: 0
ABDOMINAL PAIN: 0
EYES NEGATIVE: 1
FEVER: 0
NAUSEA: 0
COUGH: 0
PALPITATIONS: 0
VOMITING: 0
BRUISES/BLEEDS EASILY: 0

## 2020-01-09 NOTE — THERAPY
"Occupational Therapy  Daily Treatment     Patient Name: Israel Aviles  Age:  54 y.o., Sex:  male  Medical Record #: [id number]  Today's Date: 1/9/2020     Precautions  Precautions: Fall Risk, Other (See Comments), Non Weight Bearing Left Lower Extremity  Comments: MDRO contact prec, L BKA, hypotension    Safety   ADL Safety : Modified Independent  Bathroom Safety: Modified Independent  Comments: Supervision due to variable BP and orthostatic hypotension     Subjective    \"I feel fine\"     Objective       01/09/20 1501   Vitals   Patient BP Position Standing 3 minutes   Blood Pressure 102/67   Vitals Comments standing tolerance 6 min w/c<> high mat. seated prior to standing /73   Sitting Upper Body Exercises   Tricep Press Bilateral;3 sets of 15;Weight (See Comments for lbs)  (40# rickshaw)   Interdisciplinary Plan of Care Collaboration   Patient Position at End of Therapy Seated;Call Light within Reach;Tray Table within Reach   OT Total Time Spent   OT Individual Total Time Spent (Mins) 30   OT Charge Group   OT Therapy Activity 1   OT Therapeutic Exercise  1       Assessment    Pt with stable BP in seated and standing positions and reported no dizziness    Plan    Cont overall strength/endurance and standing balance to improve ADL's and functional mobility    "

## 2020-01-09 NOTE — THERAPY
"Physical Therapy   Daily Treatment     Patient Name: Israel Aviles  Age:  54 y.o., Sex:  male  Medical Record #: [id number]  Today's Date: 1/8/2020     Precautions  Precautions: Non Weight Bearing Left Lower Extremity, Fall Risk, Other (See Comments)  Comments: MDRO Contact precautions, BKA LLE, hypotension    Subjective    Pt was seated at EOB upon arrival and agreeable to treatment.  Pt's spouse present during session.       Objective       01/08/20 1231   Precautions   Precautions Non Weight Bearing Left Lower Extremity;Fall Risk;Other (See Comments)   Comments MDRO Contact precautions, BKA LLE, hypotension   Interdisciplinary Plan of Care Collaboration   Patient Position at End of Therapy Seated;Call Light within Reach;Tray Table within Reach;Phone within Reach;Family / Friend in Room   PT Total Time Spent   PT Individual Total Time Spent (Mins) 30   PT Charge Group   PT Gait Training 1   PT Therapeutic Activities 1       FIM Stairs Score:  1 - Total Assistance  Stairs Description:  Walker, Extra time, Safety concerns, Verbal cueing, Supervision for safety(Up/down 4\" platform step x 2 reps with FWW and CGA)    Discussion with pt and pt's spouse about stair set up with review of photos.  Pt has to manage 3.5\" step from garage.  Completed stair training with focus on sequencing and safety.     Assessment    Pt demonstrated good safety and sequencing with stair training.  Pt verbalized all education.      Plan    Continue family training as needed, D/C data collection (D/C pending results of wound culture on R heel), curb training with FWW, review HEP    "

## 2020-01-09 NOTE — THERAPY
"Occupational Therapy  Daily Treatment     Patient Name: Israel Aviles  Age:  54 y.o., Sex:  male  Medical Record #: [id number]  Today's Date: 1/9/2020     Precautions  Precautions: Non Weight Bearing Left Lower Extremity, Fall Risk, Other (See Comments)  Comments: MDRO Contact precautions, BKA LLE, hypotension    Safety   ADL Safety : Modified Independent  Bathroom Safety: Modified Independent  Comments: Supervision due to variable BP and orthostatic hypotension     Subjective    \"those were good, this gives me good ideas on what I can do at home\"     Objective    Prone stretches - B hip extensors x 5 min flat, x5 min on forearms. Shoulder/chest/hip stretch x 20 with scorpion stretches, x2 30 sec holds     01/09/20 0931   Vitals   Patient BP Position Sitting   Blood Pressure 109/70   O2 Delivery None (Room Air)   Sitting Upper Body Exercises   Chest Press 2 sets of 10;Bilateral;Weight (See Comments for lbs)  (4.4# yellow ball)   Front Arm Raise 2 sets of 10;Bilateral;Weight (See Comments for lbs)  (therapy ball)   Shoulder Press 2 sets of 10;Bilateral;Weight (See Comments for lbs)  (4.4# yellow ball)   Elbow Extension 2 sets of 10;Bilateral;Weight (See Comments for lbs)  (4.4# yellow ball)   Other Exercise seated EOM -  no weight x 20 each direction - arm circles   Interdisciplinary Plan of Care Collaboration   Patient Position at End of Therapy Seated;Call Light within Reach;Tray Table within Reach   OT Total Time Spent   OT Individual Total Time Spent (Mins) 60   OT Charge Group   OT Therapeutic Exercise  4     Assessment    Pt completed UB/stretching therex to the best of their abilities with no complaints of pain, also no issues with BP during positional changes at mat    Plan    Cont overall strength/endurance and standing balance to improve ADL's and functional mobility    "

## 2020-01-09 NOTE — CARE PLAN
Problem: Infection  Goal: Will remain free from infection  Outcome: PROGRESSING AS EXPECTED     Problem: Discharge Barriers/Planning  Goal: Patient's continuum of care needs will be met  Outcome: PROGRESSING AS EXPECTED

## 2020-01-09 NOTE — PROGRESS NOTES
Hospital Medicine Daily Progress Note      Chief Complaint  CHF, HTN, DM    Interval Problem Update  BP and FSBS both improving.  Microbiology results noted.    Review of Systems  Review of Systems   Constitutional: Negative for chills and fever.   HENT: Negative.    Eyes: Negative.    Respiratory: Negative for cough and shortness of breath.    Cardiovascular: Negative for chest pain and palpitations.   Gastrointestinal: Negative for abdominal pain, nausea and vomiting.   Genitourinary: Negative.    Musculoskeletal:        Wound pain   Skin: Negative for itching and rash.   Endo/Heme/Allergies: Does not bruise/bleed easily.        Physical Exam  Temp:  [36.4 °C (97.6 °F)-36.8 °C (98.2 °F)] 36.4 °C (97.6 °F)  Pulse:  [] 70  Resp:  [16-20] 20  BP: (103-128)/(70-84) 128/84  SpO2:  [98 %-100 %] 98 %    Physical Exam  Vitals signs reviewed.   Constitutional:       General: He is not in acute distress.     Appearance: Normal appearance. He is not ill-appearing.   HENT:      Head: Normocephalic and atraumatic.      Right Ear: External ear normal.      Left Ear: External ear normal.      Nose: Nose normal.   Eyes:      General:         Right eye: No discharge.         Left eye: No discharge.      Extraocular Movements: Extraocular movements intact.      Conjunctiva/sclera: Conjunctivae normal.   Neck:      Musculoskeletal: Normal range of motion and neck supple.   Cardiovascular:      Rate and Rhythm: Normal rate and regular rhythm.   Pulmonary:      Effort: Pulmonary effort is normal. No respiratory distress.      Breath sounds: Normal breath sounds. No wheezing.   Abdominal:      General: Abdomen is flat. Bowel sounds are normal.      Palpations: Abdomen is soft.   Musculoskeletal:      Comments: Left BKA dressed, right heel wound photos reviewed   Skin:     General: Skin is warm and dry.   Neurological:      General: No focal deficit present.      Mental Status: He is alert and oriented to person, place, and time.          Fluids    Intake/Output Summary (Last 24 hours) at 1/9/2020 1121  Last data filed at 1/8/2020 1800  Gross per 24 hour   Intake 810 ml   Output --   Net 810 ml       Laboratory  Recent Labs     01/08/20  0548   WBC 5.5   RBC 3.61*   HEMOGLOBIN 10.4*   HEMATOCRIT 33.0*   MCV 91.4   MCH 28.8   MCHC 31.5*   RDW 68.6*   PLATELETCT 247   MPV 9.7     Recent Labs     01/08/20  0548   SODIUM 135   POTASSIUM 4.2   CHLORIDE 103   CO2 23   GLUCOSE 137*   BUN 37*   CREATININE 0.58   CALCIUM 9.1                   Assessment/Plan  * S/P BKA (below knee amputation) unilateral, left (HCC)  Assessment & Plan  Left foot osteomyelitis S/P BKA on 12/20/19 by Dr. Zhao  Completed intravenous antimicrobial therapy per Infectious Diseases  Wound care and pain control    Non-healing wound of right heel  Assessment & Plan  S/P wound debridement w/ exposed bone  Wound GS show few WBC and rare GNR/yeast  Wound Cx possible CRE and yeast  Will consult ID    Azotemia  Assessment & Plan  Now off Aldactone  Continue to encourage PO fluids  May need IVF    HTN (hypertension)  Assessment & Plan  Off Valsartan, Coreg, and Aldactone for hypotension  Suspect pt's premorbid baseline BP's in the 90's range  Now tapered off Midodrine    CAD (coronary artery disease)  Assessment & Plan  S/P NSTEMI w/ Echo showing EF 45%  Has three vessel coronary artery disease per Cardiac Cath on 12/16/19 w/ recommendations for medical management  Off ARB, B-Bl, and diuretic due to hypotension and azotemia  Continue ASA, Plavix, and Lipitor  Outpt Cardiology F/U    Vitamin D deficiency  Assessment & Plan  Vit D level 20  Continue supplementation    Normocytic anemia- (present on admission)  Assessment & Plan  Pt Hb dropped 3 g between June 2018 and June 2019  Continue Fe supplements for low iron stores  Consider outpt GI consult in this 55 yo male w/ a fairly rapid drop in H/H due to CURTIS    Uncontrolled type 2 diabetes mellitus with hyperglycemia (HCC)- (present  on admission)  Assessment & Plan  HbA1c 8.5  On Metformin  1000 mg bid and Glipizide 10 mg bid  Continue FSBS to monitor for hypoglycemia  Outpt meds include Metformin 1000 mg bid and Glipizide 5 mg bid    PVD (peripheral vascular disease) (HCC)  Assessment & Plan  Has h/o prior LLE revascularization  On ASA, Plavix, and Lipitor  Outpt Vascular F/U    Full Code    Reviewed w/ pt and Dr. Bai

## 2020-01-09 NOTE — PROGRESS NOTES
Change of shift report received. Patient, AAOX4, VSS besides preexisting tachycardia. Rested comfortably this shift denies sleeping difficulties or concerns. BKA and right heel dressing changed, RN provided education on dressing change and continuing care once discharged next week per patient. Denies any questions or concerns at this time regarding wound care - the patient was actively engaged in learning.

## 2020-01-09 NOTE — DISCHARGE PLANNING
Met with patient and reviewed plans and current treatment plan.  We are awaiting final cultures and physician has called for ID consult.  Discussed possible need for IV antibiotics for home depending on culture outcome.  He is agreeable with referral to [facility] if this is needed.  He states that he has a walker so I will cancel the one we have ordered.  [facility] has been referred and we will update as appropriate for iv antibiotics if needed.  Will continue to follow.

## 2020-01-10 VITALS
WEIGHT: 155.42 LBS | RESPIRATION RATE: 20 BRPM | HEIGHT: 72 IN | DIASTOLIC BLOOD PRESSURE: 76 MMHG | OXYGEN SATURATION: 98 % | TEMPERATURE: 97.9 F | SYSTOLIC BLOOD PRESSURE: 117 MMHG | BODY MASS INDEX: 21.05 KG/M2 | HEART RATE: 80 BPM

## 2020-01-10 PROBLEM — D64.9 NORMOCYTIC ANEMIA: Status: RESOLVED | Noted: 2019-08-21 | Resolved: 2020-01-10

## 2020-01-10 PROBLEM — R79.89 AZOTEMIA: Status: RESOLVED | Noted: 2019-12-31 | Resolved: 2020-01-10

## 2020-01-10 PROBLEM — E55.9 VITAMIN D DEFICIENCY: Status: RESOLVED | Noted: 2019-12-28 | Resolved: 2020-01-10

## 2020-01-10 PROBLEM — Z89.512 S/P BKA (BELOW KNEE AMPUTATION) UNILATERAL, LEFT (HCC): Status: RESOLVED | Noted: 2019-12-27 | Resolved: 2020-01-10

## 2020-01-10 PROBLEM — R00.0 TACHYCARDIA: Status: RESOLVED | Noted: 2020-01-03 | Resolved: 2020-01-10

## 2020-01-10 LAB
GLUCOSE BLD-MCNC: 125 MG/DL (ref 65–99)
GLUCOSE BLD-MCNC: 178 MG/DL (ref 65–99)
GLUCOSE BLD-MCNC: 182 MG/DL (ref 65–99)

## 2020-01-10 PROCEDURE — 99233 SBSQ HOSP IP/OBS HIGH 50: CPT | Mod: 25 | Performed by: PHYSICAL MEDICINE & REHABILITATION

## 2020-01-10 PROCEDURE — 99233 SBSQ HOSP IP/OBS HIGH 50: CPT | Performed by: HOSPITALIST

## 2020-01-10 PROCEDURE — A9270 NON-COVERED ITEM OR SERVICE: HCPCS | Performed by: PHYSICAL MEDICINE & REHABILITATION

## 2020-01-10 PROCEDURE — 700102 HCHG RX REV CODE 250 W/ 637 OVERRIDE(OP): Performed by: HOSPITALIST

## 2020-01-10 PROCEDURE — 82962 GLUCOSE BLOOD TEST: CPT | Mod: 91

## 2020-01-10 PROCEDURE — 99255 IP/OBS CONSLTJ NEW/EST HI 80: CPT | Performed by: INTERNAL MEDICINE

## 2020-01-10 PROCEDURE — 97110 THERAPEUTIC EXERCISES: CPT

## 2020-01-10 PROCEDURE — 700102 HCHG RX REV CODE 250 W/ 637 OVERRIDE(OP): Performed by: PHYSICAL MEDICINE & REHABILITATION

## 2020-01-10 PROCEDURE — A9270 NON-COVERED ITEM OR SERVICE: HCPCS | Performed by: HOSPITALIST

## 2020-01-10 PROCEDURE — 97535 SELF CARE MNGMENT TRAINING: CPT

## 2020-01-10 PROCEDURE — 99356 PR PROLONGED SVC I/P OR OBS SETTING 1ST HOUR: CPT | Performed by: PHYSICAL MEDICINE & REHABILITATION

## 2020-01-10 RX ORDER — ASPIRIN 81 MG/1
81 TABLET ORAL DAILY
Qty: 30 TAB | Refills: 3 | Status: SHIPPED | OUTPATIENT
Start: 2020-01-10 | End: 2021-07-05

## 2020-01-10 RX ORDER — GLIPIZIDE 5 MG/1
10 TABLET ORAL
Qty: 60 TAB | Refills: 2 | Status: SHIPPED
Start: 2020-01-10 | End: 2020-01-28

## 2020-01-10 RX ORDER — ASCORBIC ACID 500 MG
500 TABLET ORAL 2 TIMES DAILY
Qty: 60 TAB | Refills: 1 | Status: SHIPPED | OUTPATIENT
Start: 2020-01-10 | End: 2020-12-08

## 2020-01-10 RX ORDER — ATORVASTATIN CALCIUM 80 MG/1
80 TABLET, FILM COATED ORAL DAILY
Qty: 90 TAB | Refills: 2 | Status: SHIPPED | OUTPATIENT
Start: 2020-01-10 | End: 2020-05-28 | Stop reason: SDUPTHER

## 2020-01-10 RX ORDER — CLOPIDOGREL BISULFATE 75 MG/1
75 TABLET ORAL
Qty: 30 TAB | Refills: 2 | Status: SHIPPED | OUTPATIENT
Start: 2020-01-10 | End: 2020-01-28 | Stop reason: SDUPTHER

## 2020-01-10 RX ORDER — FERROUS SULFATE 325(65) MG
325 TABLET ORAL
Qty: 30 TAB | Refills: 1 | Status: SHIPPED | OUTPATIENT
Start: 2020-01-10 | End: 2020-12-08

## 2020-01-10 RX ADMIN — GLIPIZIDE 10 MG: 5 TABLET ORAL at 09:16

## 2020-01-10 RX ADMIN — ASPIRIN 81 MG: 81 TABLET, COATED ORAL at 09:17

## 2020-01-10 RX ADMIN — METFORMIN HYDROCHLORIDE 1000 MG: 500 TABLET ORAL at 17:28

## 2020-01-10 RX ADMIN — METFORMIN HYDROCHLORIDE 1000 MG: 500 TABLET ORAL at 09:16

## 2020-01-10 RX ADMIN — GLIPIZIDE 10 MG: 5 TABLET ORAL at 17:28

## 2020-01-10 RX ADMIN — MULTIPLE VITAMINS W/ MINERALS TAB 1 TABLET: TAB at 09:16

## 2020-01-10 RX ADMIN — INSULIN LISPRO 2 UNITS: 100 INJECTION, SOLUTION INTRAVENOUS; SUBCUTANEOUS at 17:19

## 2020-01-10 RX ADMIN — OXYCODONE HYDROCHLORIDE AND ACETAMINOPHEN 500 MG: 500 TABLET ORAL at 09:16

## 2020-01-10 RX ADMIN — INSULIN LISPRO 2 UNITS: 100 INJECTION, SOLUTION INTRAVENOUS; SUBCUTANEOUS at 07:38

## 2020-01-10 RX ADMIN — VITAMIN D, TAB 1000IU (100/BT) 1000 UNITS: 25 TAB at 09:17

## 2020-01-10 RX ADMIN — FERROUS SULFATE TAB 325 MG (65 MG ELEMENTAL FE) 325 MG: 325 (65 FE) TAB at 09:16

## 2020-01-10 ASSESSMENT — ENCOUNTER SYMPTOMS
COUGH: 0
NAUSEA: 0
PALPITATIONS: 0
CHILLS: 0
EYES NEGATIVE: 1
VOMITING: 0
FEVER: 0
SHORTNESS OF BREATH: 0
BRUISES/BLEEDS EASILY: 0
ABDOMINAL PAIN: 0

## 2020-01-10 ASSESSMENT — ACTIVITIES OF DAILY LIVING (ADL)
SHOWER_TRANSFER_LEVEL_OF_ASSIST: ABLE TO COMPLETE SHOWER TRANSFER WITHOUT ASSIST
TOILET_TRANSFER_LEVEL_OF_ASSIST: ABLE TO COMPLETE TOILET TRANSFER WITHOUT ASSIST
TOILETING_LEVEL_OF_ASSIST: ABLE TO COMPLETE TOILETING WITHOUT ASSIST

## 2020-01-10 NOTE — PROGRESS NOTES
Change of shift report obtained from off going day shift RN, ANEL - patient complained of pain/soreness in his b/l hands r/t frequent blood glucose sticks. He stated he only checks 2x daily at home. No other issues or concerns expressed, slept w/o issues.

## 2020-01-10 NOTE — DISCHARGE PLANNING
Much discussion today with physicians regarding need for IV antibiotics at d/c.  It has been determined that this will not be needed per physician.  Special precautions for patient's current foot wound have been discussed and staff educated.  Physician offices have been updated. If no antibiotics need to be set up, patient may still discharge home today.  I have confirmed all with [facility] and they are ready to follow. He may need a new PCP with his new insurance and I have provided a list from [employer] online.  All other physician appointments scheduled.  Confirmed with patient that he has received his w/c, cushion and stump board.  He has a walker.  Patient is in agreement with all plans.  Staff nurse updated.    During hospitalization, I have provided support and education and have been available for questions and information during hours of operation, communicated with therapy team and MD along with providing links/resources  to outside services.    Patient verbalizes agreement with all plans and has an understanding of the next steps within the post acute services.     CASE MANAGEMENT PLAN OF CARE   Individualized Goals:   1. I will glean dme recommendations from therapist.  2. I will confirm if patient has appropriate diabetic supplies for home.    Outcome:   1. Met: patient has walker at home and has w/c, cushion and stump board delivered.  2. Met; patient has all needed supplies.

## 2020-01-10 NOTE — THERAPY
"Occupational Therapy  Daily Treatment     Patient Name: Israel Aviles  Age:  54 y.o., Sex:  male  Medical Record #: [id number]  Today's Date: 1/10/2020     Precautions  Precautions: Non Weight Bearing Left Lower Extremity, Other (See Comments), Fall Risk  Comments: MDRO contact prec, L BKA, hypotension    Safety   ADL Safety : Modified Independent  Bathroom Safety: Modified Independent  Comments: Supervision due to variable BP and orthostatic hypotension     Subjective    Pt was seated in bed and agreeable to therapy. \"My blood pressure has been fine. No problem.\"     Objective       01/10/20 0831   Precautions   Precautions Non Weight Bearing Left Lower Extremity;Other (See Comments);Fall Risk   Comments MDRO contact prec, L BKA, hypotension   Safety    ADL Safety  Modified Independent   Bathroom Safety Modified Independent   Vitals   Patient BP Position Sitting   Blood Pressure 117/76   Pain 0 - 10 Group   Pain Rating Scale (NPRS) 0   Cognition    Level of Consciousness Alert   Sitting Upper Body Exercises   Bicep Curls 2 sets of 15;Right ;Left;Medium Resistance Theraband   Upper Extremity Bike Level 4 Resistance  (Hydrobike: 5 minutes standing with no RB)   Balance   Sitting Balance (Static) Normal   Sitting Balance (Dynamic) Good   Standing Balance (Static) Fair -   Standing Balance (Dynamic) Poor +   Weight Shift Sitting Good   Weight Shift Standing Absent   Bed Mobility    Supine to Sit Modified Independent   Interdisciplinary Plan of Care Collaboration   IDT Collaboration with  Nursing;Other (See Comments);Physician  (Therapy Manager)   Patient Position at End of Therapy Seated;Self Releasing Lap Belt Applied;Call Light within Reach   Collaboration Comments RLE dressing, precautions    OT Total Time Spent   OT Individual Total Time Spent (Mins) 60   OT Charge Group   OT Self Care / ADL 4   OT Therapeutic Exercise  2       FIM Eating Score:  7 - Independent  Eating Description:       FIM Grooming Score:  " 5 - Standby Prompting/Supervision or Set-up  Grooming Description:  Adaptive equipment, Supervision for safety(standing with FWW)    FIM Bathing Score:  6 - Modified Independent  Bathing Description:       FIM Upper Body Dressin - Independent  Upper Body Dressing Description:       FIM Lower Body Dressing Score:  6 - Modified Independent  Lower Body Dressing Description:  Increased time, Assist device/equipment(grab bar)    FIM Toileting Body Dressin - Modified Independent  Toileting Description:       FIM Bed/Chair/Wheelchair Transfers Score: 5 - Standby Prompting/Supervision or Set-up  Bed/Chair/Wheelchair Transfers Description:  Increased time, Supervision for safety(to FWW)    FIM Toilet Transfer Score:  5 - Standby Prompting/Supervision or Set-up  Toilet Transfer Description:       FIM Tub/Shower Transfers Score:  6 - Modified Independent  Tub/Shower Transfers Description:  Grab bar, Adaptive equipment(From FWW)    Pt ambulated 32 ft using FWW with SBA for safety  Pt propelled w/c  over 200 ft with Mod I     Assessment    Pt participated in shower, dressing, and grooming from FWW level with SBA-Mod I, demonstrating good safety awareness and improved balance and endurance. Pt participated in therapeutic exercise while standing with increased balance and endurance while maintaining BP. Precautions were changed for the pt during session due to positive screen for MDRO and pt was returned to his room for the remainder of the therapy session.    Pt barriers include NWB LLE, orthostatic hypotension, and limited endurance.      Plan   Continue OT to address ADL/IADL independence, endurance, functional transfers, and BUE strength. Progress to ADLs with FWW at Mod I

## 2020-01-10 NOTE — THERAPY
Missed Therapy     Patient Name: Israel Aviles  Age:  54 y.o., Sex:  male  Medical Record #: [id number]  Today's Date: 1/10/2020    Discussed missed therapy with hospital administer, therapy supervisor, MD, and therapies.  Pt on hold from therapy d/t change in status.

## 2020-01-10 NOTE — PROGRESS NOTES
Rehab Progress Note     Encounter Date: 1/9/2020    CC: LLE BKA, weakness, DM    Interval Events (Subjective)  Patient sitting up in room. He reports he is doing well. He reports his wife was able to make an appointment tomorrow with Dr. Zhao but down in [place]. Discussed that his wife has been trained so she can drive him to the appointment. Otherwise discussed case with hospitalist with gram culture positive for possible CRE and yeast.  ID has been consulted and recommending wait for sensitivity. Patient denies pain. Denies SOB.    IDT Team Meeting 1/7/2020  DC/Disposition:  TBD    Objective:  VITAL SIGNS: /67   Pulse 70   Temp 37.1 °C (98.7 °F) (Temporal)   Resp 18   Ht 1.829 m (6')   Wt 70.5 kg (155 lb 6.8 oz)   SpO2 98%   BMI 21.08 kg/m²   Gen: NAD  Psych: Mood and affect appropriate  CV: RRR, no edema  Resp: CTAB, no upper airway sounds  Abd: NTND  Neuro: AOx4, 5/5 BUE  Unchanged from 1/8/20    Recent Results (from the past 72 hour(s))   ACCU-CHEK GLUCOSE    Collection Time: 01/06/20  5:06 PM   Result Value Ref Range    Glucose - Accu-Ck 182 (H) 65 - 99 mg/dL   ACCU-CHEK GLUCOSE    Collection Time: 01/06/20  9:01 PM   Result Value Ref Range    Glucose - Accu-Ck 244 (H) 65 - 99 mg/dL   ACCU-CHEK GLUCOSE    Collection Time: 01/07/20  7:32 AM   Result Value Ref Range    Glucose - Accu-Ck 166 (H) 65 - 99 mg/dL   ACCU-CHEK GLUCOSE    Collection Time: 01/07/20 11:16 AM   Result Value Ref Range    Glucose - Accu-Ck 118 (H) 65 - 99 mg/dL   CULTURE WOUND W/ GRAM STAIN    Collection Time: 01/07/20  1:25 PM   Result Value Ref Range    Significant Indicator POS (POS)     Source WND     Site RIGHT ANKLE     Culture Result - (A)     Gram Stain Result Few WBCs.  Rare Gram negative rods.  Rare Yeast.       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     Source WND     Site RIGHT ANKLE     Gram Stain Result Few WBCs.  Rare Gram negative rods.  Rare Yeast.      ACCU-CHEK GLUCOSE    Collection Time: 01/07/20  5:09 PM   Result Value Ref Range    Glucose - Accu-Ck 203 (H) 65 - 99 mg/dL   ACCU-CHEK GLUCOSE    Collection Time: 01/07/20  8:31 PM   Result Value Ref Range    Glucose - Accu-Ck 179 (H) 65 - 99 mg/dL   CBC WITHOUT DIFFERENTIAL    Collection Time: 01/08/20  5:48 AM   Result Value Ref Range    WBC 5.5 4.8 - 10.8 K/uL    RBC 3.61 (L) 4.70 - 6.10 M/uL    Hemoglobin 10.4 (L) 14.0 - 18.0 g/dL    Hematocrit 33.0 (L) 42.0 - 52.0 %    MCV 91.4 81.4 - 97.8 fL    MCH 28.8 27.0 - 33.0 pg    MCHC 31.5 (L) 33.7 - 35.3 g/dL    RDW 68.6 (H) 35.9 - 50.0 fL    Platelet Count 247 164 - 446 K/uL    MPV 9.7 9.0 - 12.9 fL   Basic Metabolic Panel    Collection Time: 01/08/20  5:48 AM   Result Value Ref Range    Sodium 135 135 - 145 mmol/L    Potassium 4.2 3.6 - 5.5 mmol/L    Chloride 103 96 - 112 mmol/L    Co2 23 20 - 33 mmol/L    Glucose 137 (H) 65 - 99 mg/dL    Bun 37 (H) 8 - 22 mg/dL    Creatinine 0.58 0.50 - 1.40 mg/dL    Calcium 9.1 8.5 - 10.5 mg/dL    Anion Gap 9.0 0.0 - 11.9   ESTIMATED GFR    Collection Time: 01/08/20  5:48 AM   Result Value Ref Range    GFR If African American >60 >60 mL/min/1.73 m 2    GFR If Non African American >60 >60 mL/min/1.73 m 2   ACCU-CHEK GLUCOSE    Collection Time: 01/08/20  7:20 AM   Result Value Ref Range    Glucose - Accu-Ck 129 (H) 65 - 99 mg/dL   ACCU-CHEK GLUCOSE    Collection Time: 01/08/20 10:40 AM   Result Value Ref Range    Glucose - Accu-Ck 149 (H) 65 - 99 mg/dL   ACCU-CHEK GLUCOSE    Collection Time: 01/08/20  5:01 PM   Result Value Ref Range    Glucose - Accu-Ck 198 (H) 65 - 99 mg/dL   ACCU-CHEK GLUCOSE    Collection Time: 01/08/20  8:26 PM   Result Value Ref Range    Glucose - Accu-Ck 152 (H) 65 - 99 mg/dL   ACCU-CHEK GLUCOSE     Collection Time: 01/09/20  7:46 AM   Result Value Ref Range    Glucose - Accu-Ck 134 (H) 65 - 99 mg/dL   ACCU-CHEK GLUCOSE    Collection Time: 01/09/20 11:24 AM   Result Value Ref Range    Glucose - Accu-Ck 130 (H) 65 - 99 mg/dL       Current Facility-Administered Medications   Medication Frequency   • insulin lispro (HUMALOG) injection 2-12 Units 4X/DAY ACHS   • glipiZIDE (GLUCOTROL) tablet 10 mg BID AC   • senna-docusate (PERICOLACE or SENOKOT S) 8.6-50 MG per tablet 2 Tab BID PRN    And   • polyethylene glycol/lytes (MIRALAX) PACKET 1 Packet QDAY PRN    And   • magnesium hydroxide (MILK OF MAGNESIA) suspension 30 mL QDAY PRN    And   • bisacodyl (DULCOLAX) suppository 10 mg QDAY PRN   • therapeutic multivitamin-minerals (THERAGRAN-M) tablet 1 Tab DAILY   • ascorbic acid tablet 500 mg BID   • glucose 4 g chewable tablet 16 g Q15 MIN PRN    And   • dextrose 50% (D50W) injection 50 mL Q15 MIN PRN   • vitamin D (cholecalciferol) 1000 UNIT tablet 1,000 Units DAILY   • Respiratory Care per Protocol Continuous RT   • hydrALAZINE (APRESOLINE) tablet 25 mg Q8HRS PRN   • acetaminophen (TYLENOL) tablet 650 mg Q4HRS PRN   • artificial tears ophthalmic solution 1 Drop PRN   • benzocaine-menthol (CEPACOL) lozenge 1 Lozenge Q2HRS PRN   • mag hydrox-al hydrox-simeth (MAALOX PLUS ES or MYLANTA DS) suspension 20 mL Q2HRS PRN   • ondansetron (ZOFRAN ODT) dispertab 4 mg 4X/DAY PRN    Or   • ondansetron (ZOFRAN) syringe/vial injection 4 mg 4X/DAY PRN   • traZODone (DESYREL) tablet 50 mg QHS PRN   • sodium chloride (OCEAN) 0.65 % nasal spray 2 Spray PRN   • aspirin EC (ECOTRIN) tablet 81 mg DAILY   • atorvastatin (LIPITOR) tablet 80 mg Nightly   • clopidogrel (PLAVIX) tablet 75 mg QHS   • ferrous sulfate tablet 325 mg QDAY with Breakfast   • metFORMIN (GLUCOPHAGE) tablet 1,000 mg BID WITH MEALS   • oxyCODONE-acetaminophen (PERCOCET) 5-325 MG per tablet 1-2 Tab Q4HRS PRN       Orders Placed This Encounter   Procedures   • Diet Order  Diabetic     Standing Status:   Standing     Number of Occurrences:   1     Order Specific Question:   Diet:     Answer:   Diabetic [3]     Order Specific Question:   Miscellaneous modifications:     Answer:   Vegetarian [13]       Assessment:  Active Hospital Problems    Diagnosis   • *S/P BKA (below knee amputation) unilateral, left ([facility])   • PVD (peripheral vascular disease) ([facility])   • Vitamin D deficiency   • CAD (coronary artery disease)   • HTN (hypertension)   • Normocytic anemia   • Diabetic polyneuropathy associated with type 2 diabetes mellitus ([facility])   • Hyperlipidemia   • Uncontrolled type 2 diabetes mellitus with hyperglycemia ([facility])       Medical Decision Making and Plan:  L BKA - Patient with 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 Zhao on 12/20/19.  -PT and OT for mobility and ADLs  -NWB LLE. IPOP to LLE     R ankle wound - Patient with ankle wound being managed by wound consult. With exposed tissue including bone after debridement.  -Recommending outpatient clinic visit with Dr. Chatterjee. Consult ID for antibiotic recommendation. Discharge delayed,   -Wound culture ordered 1/7/20 - gram stain with few gram negative rods and rare yeast. Culture with concern for CRE  - pending final susceptibility, ID to follow-up after.    Neuropathic pain - Patient would prefer no medications. PT working on desensitization and mirror therapy.     DM - Patient on Glipizide 5 mg qAM, Metformin 1000 mg BID and fingersticks   -Consult Hospitalist. Discontinued Lantus and increase Glipizide. Stopped Januvia per hospitalist     HTN/CV/PAD - Patient on ASA 81 mg and Plavix 75 QPM. Patient with recent TTE with EF of 40%. Patient on Coreg 6.25 mg, Spironolactone 25 mg daily, and Valsartan 40 mg BID  -Ongoing Hypotension, discussed with hospitalist. Decrease Spironolactone 12.5 mg daily. Discontinue Valsartan. Coreg discontinued, now with tachycardia   -Start Abdominal binder. Discontinue Spironolactone.  Midodrine reduced to 2.5 mg daily and then trial of discontinue     HLD - Patient on Atorvastatin 80 mg QHS     Anemia - Patient on Fe supplement on transfer. Check AM CBC - 10 on admission.      Vitamin D - deficient on admission. Start 1000 U     DVT Ppx - Patient on Heparin on transfer. Hopping > 100 feet, discontinue Heparin.      Dispo - Patient's discharge delayed at this time pending follow-up. Follow-up with Ortho on 1/10/20. Pending antibiotic susceptibility     Total time:  35 minutes.  I spent greater than 50% of the time for patient care, counseling, and coordination on this date, including unit/floor time, and face-to-face time with the patient as per interval events and assessment and plan above. Topics discussed included follow-up tomorrow with Ortho, concern for CRE on cultures, and ID consulted. Pending final read     Oliverio Bai M.D.

## 2020-01-10 NOTE — CARE PLAN
Problem: Communication  Goal: The ability to communicate needs accurately and effectively will improve  Outcome: PROGRESSING SLOWER THAN EXPECTED  Now in strict isolation, with one primary nurse only. To stay in room with door closed. Hourly rounding continued. Pt is MOD I at W/C level. No therapies, to be discharged as soon possible. MDRO, now with CDC consultation. Pt seems to understand what changed and why; will continue to query pt as day progresses     Problem: Infection  Goal: Will remain free from infection  Outcome: PROGRESSING SLOWER THAN EXPECTED  As above.  MEWs today is 0. Pt denies c/o discomfort, chills, fever etc.

## 2020-01-10 NOTE — CARE PLAN
Problem: Communication  Goal: The ability to communicate needs accurately and effectively will improve  Outcome: PROGRESSING AS EXPECTED     Problem: Safety  Goal: Will remain free from injury  Outcome: PROGRESSING AS EXPECTED  Goal: Will remain free from falls  Outcome: PROGRESSING AS EXPECTED     Problem: Infection  Goal: Will remain free from infection  Outcome: PROGRESSING AS EXPECTED     Problem: Venous Thromboembolism (VTW)/Deep Vein Thrombosis (DVT) Prevention:  Goal: Patient will participate in Venous Thrombosis (VTE)/Deep Vein Thrombosis (DVT)Prevention Measures  Outcome: PROGRESSING AS EXPECTED     Problem: Bowel/Gastric:  Goal: Normal bowel function is maintained or improved  Outcome: PROGRESSING AS EXPECTED  Goal: Will not experience complications related to bowel motility  Outcome: PROGRESSING AS EXPECTED     Problem: Knowledge Deficit  Goal: Knowledge of disease process/condition, treatment plan, diagnostic tests, and medications will improve  Outcome: PROGRESSING AS EXPECTED  Goal: Knowledge of the prescribed therapeutic regimen will improve  Outcome: PROGRESSING AS EXPECTED     Problem: Discharge Barriers/Planning  Goal: Patient's continuum of care needs will be met  Outcome: PROGRESSING AS EXPECTED     Problem: Pain Management  Goal: Pain level will decrease to patient's comfort goal  Outcome: PROGRESSING AS EXPECTED     Problem: Bathing  Goal: STG-Within one week, patient will bathe  Description  1) Individualized Goal:  Mod I using AE/DME PRN   2) Interventions:  OT E Stim Attended, OT Group Therapy, OT Self Care/ADL, OT Cognitive Skill Dev, OT Community Reintegration, OT Manual Ther Technique, OT Neuro Re-Ed/Balance, OT Sensory Int Techniques, OT Therapeutic Activity, OT Evaluation and OT Therapeutic Exercise       Outcome: PROGRESSING AS EXPECTED     Problem: Dressing  Goal: STG-Within one week, patient will dress LB  Description  1) Individualized Goal:  With Mod I using AE/DME PRN   2)  Interventions:  OT E Stim Attended, OT Group Therapy, OT Self Care/ADL, OT Cognitive Skill Dev, OT Community Reintegration, OT Manual Ther Technique, OT Neuro Re-Ed/Balance, OT Sensory Int Techniques, OT Therapeutic Activity, OT Evaluation and OT Therapeutic Exercise       Outcome: PROGRESSING AS EXPECTED     Problem: Toileting  Goal: STG-Within one week, patient will complete toileting tasks  Description  1) Individualized Goal:  With SBA consistently with no cues for set-up/safety  2) Interventions:  OT E Stim Attended, OT Group Therapy, OT Self Care/ADL, OT Cognitive Skill Dev, OT Community Reintegration, OT Manual Ther Technique, OT Neuro Re-Ed/Balance, OT Sensory Int Techniques, OT Therapeutic Activity, OT Evaluation and OT Therapeutic Exercise      Outcome: PROGRESSING AS EXPECTED     Problem: Functional Transfers  Goal: STG-Within one week, patient will transfer to toilet  Description  1) Individualized Goal:  With SBA consistently with no cues for set-up/safety  2) Interventions:  OT E Stim Attended, OT Group Therapy, OT Self Care/ADL, OT Cognitive Skill Dev, OT Community Reintegration, OT Manual Ther Technique, OT Neuro Re-Ed/Balance, OT Sensory Int Techniques, OT Therapeutic Activity, OT Evaluation and OT Therapeutic Exercise      Outcome: PROGRESSING AS EXPECTED     Problem: OT Long Term Goals  Goal: LTG-By discharge, patient will complete basic self care tasks  Description  1) Individualized Goal:  With mod I  2) Interventions:  OT E Stim Attended, OT Group Therapy, OT Self Care/ADL, OT Cognitive Skill Dev, OT Community Reintegration, OT Manual Ther Technique, OT Neuro Re-Ed/Balance, OT Sensory Int Techniques, OT Therapeutic Activity, OT Evaluation and OT Therapeutic Exercise   Outcome: PROGRESSING AS EXPECTED     Problem: PT-Long Term Goals  Goal: LTG-By discharge, patient will propel wheelchair  Description  1) Individualized goal:  300 feet indoors/outdoors at Mod I level  2) Interventions:  PT Group  Therapy, PT Prosthetic Training, PT Gait Training, PT Self Care/Home Eval, PT Therapeutic Exercises, PT TENS Application, PT Neuro Re-Ed/Balance, PT Therapeutic Activity and PT Manual Therapy   Outcome: PROGRESSING AS EXPECTED  Goal: LTG-By discharge, patient will ambulate  Description  1) Individualized goal:  with FWW for 25 feet indoors at SPV level  2) Interventions:  PT Group Therapy, PT Prosthetic Training, PT Gait Training, PT Self Care/Home Eval, PT Therapeutic Exercises, PT TENS Application, PT Neuro Re-Ed/Balance, PT Therapeutic Activity and PT Manual Therapy     Outcome: PROGRESSING AS EXPECTED  Goal: LTG-By discharge, patient will transfer one surface to another  Description  1) Individualized goal:  with FWW at Mod I level for bed transfers  2) Interventions:  PT Group Therapy, PT Prosthetic Training, PT Gait Training, PT Self Care/Home Eval, PT Therapeutic Exercises, PT TENS Application, PT Neuro Re-Ed/Balance, PT Therapeutic Activity and PT Manual Therapy       Outcome: PROGRESSING AS EXPECTED  Goal: LTG-By discharge, patient will transfer in/out of a car  Description  1) Individualized goal:  with FWW at SPV level for car transfers  2) Interventions:  PT Group Therapy, PT Prosthetic Training, PT Gait Training, PT Self Care/Home Eval, PT Therapeutic Exercises, PT TENS Application, PT Neuro Re-Ed/Balance, PT Therapeutic Activity and PT Manual Therapy     Outcome: PROGRESSING AS EXPECTED     Problem: Acute Care of the Diabetic Patient  Goal: Optimal Outcome for the Diabetic Patient  Outcome: PROGRESSING AS EXPECTED     Problem: Mobility  Goal: STG-Within one week, patient will ambulate community distances  Description  1) Individualized goal:  Patient will amb >150 ft with FWW indoors SP, and mod I outdoors with wc mob  2) Interventions:  PT Group Therapy, PT E Stim Attended, PT Gait Training, PT Therapeutic Exercises, PT Neuro Re-Ed/Balance, PT Aquatic Therapy, PT Therapeutic Activity and PT Manual  Therapy     Outcome: PROGRESSING AS EXPECTED  Goal: STG-Within one week, patient will ambulate up/down a curb  Description  1) Individualized goal:  Patient will amb up/down curb 2x with FWW CGA  2) Interventions:   PT Group Therapy, PT E Stim Attended, PT Gait Training, PT Therapeutic Exercises, PT Neuro Re-Ed/Balance, PT Aquatic Therapy, PT Therapeutic Activity and PT Manual Therapy     Outcome: PROGRESSING AS EXPECTED     Problem: Mobility Transfers  Goal: STG-Within one week, patient will sit to stand  Description  1) Individualized goal:  Patient will transfer mod I with FWW STS/SPT  2) Interventions:   PT Group Therapy, PT E Stim Attended, PT Gait Training, PT Therapeutic Exercises, PT Neuro Re-Ed/Balance, PT Aquatic Therapy, PT Therapeutic Activity and PT Manual Therapy     Outcome: PROGRESSING AS EXPECTED

## 2020-01-10 NOTE — THERAPY
Physical Therapy   Daily Treatment     Patient Name: Israel Aviles  Age:  54 y.o., Sex:  male  Medical Record #: [id number]  Today's Date: 1/9/2020     Precautions  Precautions: Fall Risk, Other (See Comments), Non Weight Bearing Left Lower Extremity  Comments: MDRO contact prec, L BKA, hypotension    Subjective    Pt was seated in w/c upon arrival and agreeable to treatment.  Pt reported he has an appointment tomorrow.       Objective       01/09/20 1531   Precautions   Precautions Fall Risk;Other (See Comments);Non Weight Bearing Left Lower Extremity   Vitals   Blood Pressure 103/70   Interdisciplinary Plan of Care Collaboration   Patient Position at End of Therapy Seated;Call Light within Reach;Tray Table within Reach;Phone within Reach   PT Total Time Spent   PT Individual Total Time Spent (Mins) 30   PT Charge Group   PT Gait Training 1   PT Therapeutic Activities 1       FIM Walking Score:  5 - Standby Prompting/Supervision or Set-up  Walking Description:  Extra time, Safety concerns, Verbal cueing, Supervision for safety, Walker(AMB x 220 feet with FWW and CGA/SBA)    FIM Wheelchair Score:  6 - Modified Independent  Wheelchair Description:  Adaptive equipment, Extra time(x150 feet with BUE, mod I)     Discussion of POC.    Assessment    Pt demonstrated improving endurance and activity tolerance this session with gait training.  Pt with no report of dizziness throughout session with vitals stable.      Plan    D/C currently pending infectious disease results, continue gait training with FWW, review HEP, continue training on residual limb wrapping.

## 2020-01-10 NOTE — PROGRESS NOTES
Hospital Medicine Daily Progress Note      Chief Complaint  CHF, HTN, DM    Interval Problem Update  No 24 hour clinical changes.  Wound Cx +CR Kleb.    Review of Systems  Review of Systems   Constitutional: Negative for chills and fever.   HENT: Negative.    Eyes: Negative.    Respiratory: Negative for cough and shortness of breath.    Cardiovascular: Negative for chest pain and palpitations.   Gastrointestinal: Negative for abdominal pain, nausea and vomiting.   Genitourinary: Negative.    Musculoskeletal:        Wound pain   Skin: Negative for itching and rash.   Endo/Heme/Allergies: Does not bruise/bleed easily.        Physical Exam  Temp:  [36.6 °C (97.9 °F)-37.1 °C (98.8 °F)] 36.6 °C (97.9 °F)  Pulse:  [70-99] 80  Resp:  [18-20] 20  BP: (102-130)/(67-80) 117/76    Physical Exam  Vitals signs reviewed.   Constitutional:       General: He is not in acute distress.     Appearance: Normal appearance. He is not ill-appearing.   HENT:      Head: Normocephalic and atraumatic.      Right Ear: External ear normal.      Left Ear: External ear normal.      Nose: Nose normal.   Eyes:      General:         Right eye: No discharge.         Left eye: No discharge.      Extraocular Movements: Extraocular movements intact.      Conjunctiva/sclera: Conjunctivae normal.   Neck:      Musculoskeletal: Normal range of motion and neck supple.   Cardiovascular:      Rate and Rhythm: Normal rate and regular rhythm.   Pulmonary:      Effort: Pulmonary effort is normal. No respiratory distress.      Breath sounds: Normal breath sounds. No wheezing.   Abdominal:      General: Abdomen is flat. Bowel sounds are normal.      Palpations: Abdomen is soft.   Musculoskeletal:      Comments: Left BKA dressed, right heel wound photos reviewed   Skin:     General: Skin is warm and dry.   Neurological:      General: No focal deficit present.      Mental Status: He is alert and oriented to person, place, and time.         Fluids    Intake/Output  Summary (Last 24 hours) at 1/10/2020 1339  Last data filed at 1/9/2020 1610  Gross per 24 hour   Intake 120 ml   Output --   Net 120 ml       Laboratory  Recent Labs     01/08/20  0548   WBC 5.5   RBC 3.61*   HEMOGLOBIN 10.4*   HEMATOCRIT 33.0*   MCV 91.4   MCH 28.8   MCHC 31.5*   RDW 68.6*   PLATELETCT 247   MPV 9.7     Recent Labs     01/08/20  0548   SODIUM 135   POTASSIUM 4.2   CHLORIDE 103   CO2 23   GLUCOSE 137*   BUN 37*   CREATININE 0.58   CALCIUM 9.1                   Assessment/Plan  * S/P BKA (below knee amputation) unilateral, left (HCC)  Assessment & Plan  Left foot osteomyelitis S/P BKA on 12/20/19 by Dr. Zhao  Completed intravenous antimicrobial therapy per Infectious Diseases  Wound care and pain control  Ortho F/U today    Non-healing wound of right heel  Assessment & Plan  S/P wound debridement w/ exposed bone  Wound Cx +Carbapenem Resistant Klebsiella and Yeast  Per Dr. Gonzalez, wound does not appear clinically infected so hold off on antibiotics, continue local wound care, and close monitoring    Azotemia  Assessment & Plan  Now off Aldactone  Continue to encourage PO fluids    HTN (hypertension)  Assessment & Plan  Off Valsartan, Coreg, and Aldactone for hypotension  Suspect pt's premorbid baseline BP's in the 90's range  Now tapered off Midodrine    CAD (coronary artery disease)  Assessment & Plan  S/P NSTEMI w/ Echo showing EF 45%  Has three vessel coronary artery disease per Cardiac Cath on 12/16/19 w/ recommendations for medical management  Off ARB, B-Bl, and diuretic due to hypotension and azotemia  Continue ASA, Plavix, and Lipitor  Outpt Cardiology F/U  Cautiously resume ACE-I or ARB, B-Bl, and Lasix/Aldactone as outpt    Vitamin D deficiency  Assessment & Plan  Vit D level 20  Continue supplementation    Normocytic anemia- (present on admission)  Assessment & Plan  Pt Hb dropped 3 g between June 2018 and June 2019  Continue Fe supplements for low iron stores  Consider outpt GI consult  in this 55 yo male w/ a fairly rapid drop in H/H due to CURTIS    Uncontrolled type 2 diabetes mellitus with hyperglycemia (HCC)- (present on admission)  Assessment & Plan  HbA1c 8.5  Continue Metformin  1000 mg bid and Glipizide 10 mg bid  Will not need SSI on discharge  Outpt meds include Metformin 1000 mg bid and Glipizide 5 mg bid    PVD (peripheral vascular disease) (HCC)  Assessment & Plan  Has h/o prior LLE revascularization  On ASA, Plavix, and Lipitor  Outpt Vascular F/U    Full Code    Discussed at length with patient, Collette Miller (ID Pharmacist) Dr. Gonzalez (ID), Dr. Bai (Attending Physiatrist), and Dr. Epstein (Physiatry Medical Director).

## 2020-01-10 NOTE — PROGRESS NOTES
Rehab Progress Note     Encounter Date: 1/10/2020    CC: LLE BKA, weakness, DM    Interval Events (Subjective)  Patient sitting up in room. Discussed with patient that we had long discussion with ID team as well as infection control and patient does not need antibiotics at this time. Discussed that ongoing discussion about follow-up occurring this morning. He will have orthopedic appointment this afternoon then possible discharge. Patient denies pain. Denies Dizziness.     Discussed separately in afternoon ~1PM with patient and son. OKed for leaving for orthopedic appointment which family will drive him to. Still waiting on definitive treatment. Discussed most likely discharge this afternoon. Son has many questions about prognosis. Discussed that he should speak to both the Orthopedic surgeon's team as well as the vascular surgery team next week. Discussed that concern for wound healing but infection control believe he is colonized with the MDRO.  Discussed no antibiotics at this time.      IDT Team Meeting 1/7/2020  DC/Disposition:  TBD    Objective:  VITAL SIGNS: /76   Pulse 80   Temp 36.6 °C (97.9 °F) (Temporal)   Resp 20   Ht 1.829 m (6')   Wt 70.5 kg (155 lb 6.8 oz)   SpO2 98%   BMI 21.08 kg/m²   Gen: NAD  Psych: Mood and affect appropriate  CV: RRR, no edema  Resp: CTAB, no upper airway sounds  Abd: NTND  Neuro: AOx4, 5/5 BUE  Unchanged from 1/8/20    Recent Results (from the past 72 hour(s))   ACCU-CHEK GLUCOSE    Collection Time: 01/07/20  5:09 PM   Result Value Ref Range    Glucose - Accu-Ck 203 (H) 65 - 99 mg/dL   ACCU-CHEK GLUCOSE    Collection Time: 01/07/20  8:31 PM   Result Value Ref Range    Glucose - Accu-Ck 179 (H) 65 - 99 mg/dL   CBC WITHOUT DIFFERENTIAL    Collection Time: 01/08/20  5:48 AM   Result Value Ref Range    WBC 5.5 4.8 - 10.8 K/uL    RBC 3.61 (L) 4.70 - 6.10 M/uL    Hemoglobin 10.4 (L) 14.0 - 18.0 g/dL    Hematocrit 33.0 (L) 42.0 - 52.0 %    MCV 91.4 81.4 - 97.8 fL    MCH  28.8 27.0 - 33.0 pg    MCHC 31.5 (L) 33.7 - 35.3 g/dL    RDW 68.6 (H) 35.9 - 50.0 fL    Platelet Count 247 164 - 446 K/uL    MPV 9.7 9.0 - 12.9 fL   Basic Metabolic Panel    Collection Time: 01/08/20  5:48 AM   Result Value Ref Range    Sodium 135 135 - 145 mmol/L    Potassium 4.2 3.6 - 5.5 mmol/L    Chloride 103 96 - 112 mmol/L    Co2 23 20 - 33 mmol/L    Glucose 137 (H) 65 - 99 mg/dL    Bun 37 (H) 8 - 22 mg/dL    Creatinine 0.58 0.50 - 1.40 mg/dL    Calcium 9.1 8.5 - 10.5 mg/dL    Anion Gap 9.0 0.0 - 11.9   ESTIMATED GFR    Collection Time: 01/08/20  5:48 AM   Result Value Ref Range    GFR If African American >60 >60 mL/min/1.73 m 2    GFR If Non African American >60 >60 mL/min/1.73 m 2   ACCU-CHEK GLUCOSE    Collection Time: 01/08/20  7:20 AM   Result Value Ref Range    Glucose - Accu-Ck 129 (H) 65 - 99 mg/dL   ACCU-CHEK GLUCOSE    Collection Time: 01/08/20 10:40 AM   Result Value Ref Range    Glucose - Accu-Ck 149 (H) 65 - 99 mg/dL   ACCU-CHEK GLUCOSE    Collection Time: 01/08/20  5:01 PM   Result Value Ref Range    Glucose - Accu-Ck 198 (H) 65 - 99 mg/dL   ACCU-CHEK GLUCOSE    Collection Time: 01/08/20  8:26 PM   Result Value Ref Range    Glucose - Accu-Ck 152 (H) 65 - 99 mg/dL   ACCU-CHEK GLUCOSE    Collection Time: 01/09/20  7:46 AM   Result Value Ref Range    Glucose - Accu-Ck 134 (H) 65 - 99 mg/dL   ACCU-CHEK GLUCOSE    Collection Time: 01/09/20 11:24 AM   Result Value Ref Range    Glucose - Accu-Ck 130 (H) 65 - 99 mg/dL   ACCU-CHEK GLUCOSE    Collection Time: 01/09/20  5:06 PM   Result Value Ref Range    Glucose - Accu-Ck 205 (H) 65 - 99 mg/dL   ACCU-CHEK GLUCOSE    Collection Time: 01/09/20  9:05 PM   Result Value Ref Range    Glucose - Accu-Ck 191 (H) 65 - 99 mg/dL   ACCU-CHEK GLUCOSE    Collection Time: 01/10/20  7:35 AM   Result Value Ref Range    Glucose - Accu-Ck 182 (H) 65 - 99 mg/dL   ACCU-CHEK GLUCOSE    Collection Time: 01/10/20 11:06 AM   Result Value Ref Range    Glucose - Accu-Ck 125 (H) 65 -  99 mg/dL       Current Facility-Administered Medications   Medication Frequency   • insulin lispro (HUMALOG) injection 2-12 Units 4X/DAY ACHS   • glipiZIDE (GLUCOTROL) tablet 10 mg BID AC   • senna-docusate (PERICOLACE or SENOKOT S) 8.6-50 MG per tablet 2 Tab BID PRN    And   • polyethylene glycol/lytes (MIRALAX) PACKET 1 Packet QDAY PRN    And   • magnesium hydroxide (MILK OF MAGNESIA) suspension 30 mL QDAY PRN    And   • bisacodyl (DULCOLAX) suppository 10 mg QDAY PRN   • therapeutic multivitamin-minerals (THERAGRAN-M) tablet 1 Tab DAILY   • ascorbic acid tablet 500 mg BID   • glucose 4 g chewable tablet 16 g Q15 MIN PRN    And   • dextrose 50% (D50W) injection 50 mL Q15 MIN PRN   • vitamin D (cholecalciferol) 1000 UNIT tablet 1,000 Units DAILY   • Respiratory Care per Protocol Continuous RT   • hydrALAZINE (APRESOLINE) tablet 25 mg Q8HRS PRN   • acetaminophen (TYLENOL) tablet 650 mg Q4HRS PRN   • artificial tears ophthalmic solution 1 Drop PRN   • benzocaine-menthol (CEPACOL) lozenge 1 Lozenge Q2HRS PRN   • mag hydrox-al hydrox-simeth (MAALOX PLUS ES or MYLANTA DS) suspension 20 mL Q2HRS PRN   • ondansetron (ZOFRAN ODT) dispertab 4 mg 4X/DAY PRN    Or   • ondansetron (ZOFRAN) syringe/vial injection 4 mg 4X/DAY PRN   • traZODone (DESYREL) tablet 50 mg QHS PRN   • sodium chloride (OCEAN) 0.65 % nasal spray 2 Spray PRN   • aspirin EC (ECOTRIN) tablet 81 mg DAILY   • atorvastatin (LIPITOR) tablet 80 mg Nightly   • clopidogrel (PLAVIX) tablet 75 mg QHS   • ferrous sulfate tablet 325 mg QDAY with Breakfast   • metFORMIN (GLUCOPHAGE) tablet 1,000 mg BID WITH MEALS   • oxyCODONE-acetaminophen (PERCOCET) 5-325 MG per tablet 1-2 Tab Q4HRS PRN       Orders Placed This Encounter   Procedures   • Diet Order Diabetic     Standing Status:   Standing     Number of Occurrences:   1     Order Specific Question:   Diet:     Answer:   Diabetic [3]     Order Specific Question:   Miscellaneous modifications:     Answer:   Vegetarian  [13]       Assessment:  Active Hospital Problems    Diagnosis   • *S/P BKA (below knee amputation) unilateral, left ([facility])   • PVD (peripheral vascular disease) ([facility])   • Vitamin D deficiency   • CAD (coronary artery disease)   • HTN (hypertension)   • Normocytic anemia   • Diabetic polyneuropathy associated with type 2 diabetes mellitus ([facility])   • Hyperlipidemia   • Uncontrolled type 2 diabetes mellitus with hyperglycemia ([facility])       Medical Decision Making and Plan:  L BKA - Patient with 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 Zhao on 12/20/19.  -PT and OT for mobility and ADLs  -NWB LLE. IPOP to LLE     R ankle wound - Patient with ankle wound being managed by wound consult. With exposed tissue including bone after debridement.  -Recommending outpatient clinic visit with Dr. Chatterjee. Consult ID for antibiotic recommendation. Discharge delayed,   -Wound culture ordered 1/7/20 - gram stain with few gram negative rods and rare yeast. Culture with concern for Klebsiella with multiple resistances. Yeast still pending. Long discussion with ID team, hospitalist and family.      Neuropathic pain - Patient would prefer no medications. PT working on desensitization and mirror therapy.     DM - Patient on Glipizide 5 mg qAM, Metformin 1000 mg BID and fingersticks   -Consult Hospitalist. Discontinued Lantus and increase Glipizide. Stopped Januvia per hospitalist     HTN/CV/PAD - Patient on ASA 81 mg and Plavix 75 QPM. Patient with recent TTE with EF of 40%. Patient on Coreg 6.25 mg, Spironolactone 25 mg daily, and Valsartan 40 mg BID  -Ongoing Hypotension, discussed with hospitalist. Decrease Spironolactone 12.5 mg daily. Discontinue Valsartan. Coreg discontinued, now with tachycardia   -Start Abdominal binder. Discontinue Spironolactone. Midodrine reduced to 2.5 mg daily and then trial of discontinue     HLD - Patient on Atorvastatin 80 mg QHS     Anemia - Patient on Fe supplement on transfer. Check AM CBC  - 10 on admission.      Vitamin D - deficient on admission. Start 1000 U     DVT Ppx - Patient on Heparin on transfer. Hopping > 100 feet, discontinue Heparin.      Dispo - Patient's discharge delayed at this time pending follow-up. Follow-up with Ortho on 1/10/20. Pending antibiotic susceptibility     Total time:  35 minutes.  I spent greater than 50% of the time for patient care, counseling, and coordination on this date, including unit/floor time, and face-to-face time with the patient as per interval events and assessment and plan above. Topics discussed included discharge planning, infection control, infectious disease and no antibiotics at this time. Patient OKed to go to surgical appointment this afternoon. Pending discharge    Oliverio Bai M.D.    Additional: 1250 to 1330 spent time face to face with patient and son. Discussed prognosis and wound healing. Discussed questions to ask surgical team. Discussed about follow-up. Discussed about possible discharge after appointment. Discussed most likely further testing will be done and the [facility] may require additional testing.

## 2020-01-10 NOTE — PROGRESS NOTES
Lou RODGERS for Dr. Zhao at [facility] in [place] has been notified that Mr. Aviles has resistant organism in culture of ankle. Advised to clean environment after visit and remind to have immaculate hand hygiene practice

## 2020-01-10 NOTE — THERAPY
Physical Therapy   Daily Treatment     Patient Name: Israel Aviles  Age:  54 y.o., Sex:  male  Medical Record #: [id number]  Today's Date: 1/9/2020     Precautions  Precautions: Fall Risk, Other (See Comments), Non Weight Bearing Left Lower Extremity  Comments: MDRO contact prec, L BKA, hypotension    Subjective    Pt in room with wife at bedside, agreeable to therapy.  No complaints.     Objective       01/09/20 1301   Precautions   Precautions Fall Risk;Other (See Comments);Non Weight Bearing Left Lower Extremity   Comments MDRO contact prec, L BKA, hypotension   Vitals   Blood Pressure (!) 94/72   Vitals Comments seated post supine ex, pt symptomatic, requested vitals taken/dizziness   Supine Lower Body Exercise   Bridges One Legged;Other Resistance (See Comments)  (removed bolster 2x20)   Hip Abduction Side Lying  (2x 20)   Straight Leg Raises Front;Other Resistance (See Comments)  (2x 20)   Other Exercises prone  hip ext BLE's 2x20   PT Total Time Spent   PT Individual Total Time Spent (Mins) 60   PT Charge Group   PT Therapeutic Exercise 3   PT Therapeutic Activities 1       FIM Bed/Chair/Wheelchair Transfers Score: 6 - Modified Independent  Bed/Chair/Wheelchair Transfers Description:  (Zac w/c<>bed/mat, sit<>sup Zac)     Assessment    Pt c/o dizziness upon returning to sitting.   Manual BP taken, 94/72 in sitting.  Discussed fluids, pt reports still being restricted on amount of fluid able to consume.    Pt demo poor LE strength and endurance.  Would benefit from trunk ext strengthenin ex's.    Plan    Family training, balance training, LE and extension strengthening, endurance training

## 2020-01-11 NOTE — CARE PLAN
Problem: PT-Long Term Goals  Goal: LTG-By discharge, patient will propel wheelchair  Description  1) Individualized goal:  300 feet indoors/outdoors at Mod I level  2) Interventions:  PT Group Therapy, PT Prosthetic Training, PT Gait Training, PT Self Care/Home Eval, PT Therapeutic Exercises, PT TENS Application, PT Neuro Re-Ed/Balance, PT Therapeutic Activity and PT Manual Therapy   Outcome: MET  Goal: LTG-By discharge, patient will ambulate  Description  1) Individualized goal:  with FWW for 25 feet indoors at SPV level  2) Interventions:  PT Group Therapy, PT Prosthetic Training, PT Gait Training, PT Self Care/Home Eval, PT Therapeutic Exercises, PT TENS Application, PT Neuro Re-Ed/Balance, PT Therapeutic Activity and PT Manual Therapy     Outcome: MET  Goal: LTG-By discharge, patient will transfer one surface to another  Description  1) Individualized goal:  with FWW at Mod I level for bed transfers  2) Interventions:  PT Group Therapy, PT Prosthetic Training, PT Gait Training, PT Self Care/Home Eval, PT Therapeutic Exercises, PT TENS Application, PT Neuro Re-Ed/Balance, PT Therapeutic Activity and PT Manual Therapy       Outcome: MET  Goal: LTG-By discharge, patient will transfer in/out of a car  Description  1) Individualized goal:  with FWW at [facility] level for car transfers  2) Interventions:  PT Group Therapy, PT Prosthetic Training, PT Gait Training, PT Self Care/Home Eval, PT Therapeutic Exercises, PT TENS Application, PT Neuro Re-Ed/Balance, PT Therapeutic Activity and PT Manual Therapy     Outcome: MET

## 2020-01-11 NOTE — DISCHARGE PLANNING
Update from physician and patient cleared for d/c. I discussed new PCP with patient and he asked that I call his wife. Patient's wife would like for me to try to find a primary care and they will let me know if they have a preference after they look at the list.  I attempted a few calls but offices are now closed.  I confirmed with patient's son that I will make calls on Monday and give them a call when I get someone scheduled.

## 2020-01-11 NOTE — PROGRESS NOTES
Patient discharged to home per order.  Discharge instructions reviewed with patient and spouse; they verbalize understanding and signed copies placed in chart.  Patient has all belongings; signed copy of form in chart.  Patient left facility at 1800 via W/C accompanied by spouse. Have enjoyed working with this incredibly sweet pt. See case management notes, Dr Bai note. Home Health to see pt on Monday. Info for a new primary MD given to pt and his wife, other follow ups are scheduled

## 2020-01-11 NOTE — CONSULTS
DATE OF SERVICE:  01/10/2020    INFECTIOUS DISEASE CONSULTATION    REASON FOR CONSULT:  CRE.    CONSULTING PHYSICIAN:  Shawnee Nagel MD.    HISTORY OF PRESENT ILLNESS:  This is a 54-year-old  male who is well   known to the infectious diseases service from his recent hospitalization for   left lower extremity gangrene with left lower extremity diabetic foot   ulceration and cellulitis.  He had been in [place] just prior to his last admission.  He was admitted to   the hospital in December of 2019.  He had been feeling ill for several   months, particularly the 3-4 weeks prior to admission.  He states that he was   in hospital in [place] for pneumonia, but did not seek treatment for his left lower extremity   infection.  He came to the hospital promptly after his transoceanic flight.    While in the hospital, he required cardiac catheterization for congestive   heart failure and ischemic cardiomyopathy.  He states when he was in [place], he   was treated for pneumonia with Zyvox and meropenem and that they did not   treat his foot as he had declined any treatment for that.    For his left lower extremity, he had a previous infection with   methicillin-sensitive Staph aureus and Enterococcus faecalis.  He had   completed a prolonged course of daptomycin, but had declined BKA.  He is   noncompliant with followup with wound care.  He attributed to his worsening   gangrene and left lower extremity ulcerations to ill-fitting shoes.  His   imaging on that admission showed multifocal osteomyelitis as well as angélica   gangrene.  He underwent BKA on 12/20/2019.  Cultures at that time were   negative.  His stool was noted to be positive for carbapenemase thus, he was placed on strict contact   isolation.  He was seen and evaluated by vascular surgery, but due to prior   noncompliance, no vascular intervention was done.  He is a diabetic and he is status   post cardiac catheterization as above.  His EF was 15% on admission.  He was  noted at   discharge to have a pressure ulceration on his right heel.  He was discharged   to [facility] on 12/27/2019.  While he was in rehab, wound care debrided his   right heel.  There was report that they thought it went to the bone, so even   though it lacked any signs of an active infection, wound swab was done. The culture is now   growing CRE and yeast.  Infectious disease is consulted for recommendations.    REVIEW OF SYSTEMS:  Negative for fever, chills, nausea, vomiting, diarrhea, or   other systemic complaints.  He had no active drainage, redness, or swelling   of his right lower extremity.  He states he has done well with rehab   Otherwise, all other systems are reviewed and are negative.    ALLERGIES:  He has no known antibiotic allergies.    PAST MEDICAL HISTORY:  1.  Diabetes.  2.  Peripheral vascular disease.  3.  Left lower extremity gangrene.  4.  Hyperlipidemia.  5.  Status post left BKA.  6.  Ischemic cardiomyopathy.  7.  Recent STEMI.  8.  Cardiac catheterization.    FAMILY HISTORY:  Diabetes.    SOCIAL HISTORY:  He does have the recent travel and hospitalization in [place].    He does not smoke, drink, or use IV drugs.    PHYSICAL EXAMINATION:  GENERAL:  He is a well-nourished, well-developed male in no acute distress,   pleasant, and cooperative.  VITAL SIGNS:  He is afebrile, current temperature is 97.9, blood pressure   130/80, pulse of 80, respiratory rate 20, and oxygen saturation 98% to 100% on   room air.  He weighs 70 kilos.  HEENT:  Normocephalic and atraumatic.  Pupils are equal, round, and reactive   to light.  Extraocular movements are intact.  Oropharynx is clear.  He has   anicteric sclerae.  He has no conjunctivitis.  He has fair dentition.  NECK:  Supple.  There is no JVD or stridor.  CARDIOVASCULAR:  Regular rate and rhythm, unable to auscultate murmurs, rubs,   or gallops.  CHEST:  Grossly clear to auscultation bilaterally, unlabored.  There is no   wheezing or rhonchi.   There is no chest tenderness.  ABDOMEN:  Soft, nontender, and nondistended.  There is no rebound or guarding.  EXTREMITIES:  No cyanosis, clubbing, or edema.  He has left lower extremity   BKA.  His BKA site has intact sutures per last pictures.  As of 01/04/2019, he had intact sutures and no dehiscence.   Wound pictures of his RLE were reviewed. On admission to rehab on 12/27, the pressure ulceration was noted and   measured less than 1 cm in size.  He underwent a debridement on 01/01/2020 of   slough measured 0.8x1.4 cm.  On 01/04/2020, no wound measurements were done.    No surrounding cellulitis or inflammation or drainage is noted.  He developed   some additional slough on 01/06/2020 wound measured 0.9x0.7.  After   debridement, wound measured 0.6x1 cm.  Wound was deep to fascia and/or periosteum.    Again, no active drainage was seen. Currently wound remains less than 1 cm X0.6 cm-no erythema or drainage seen  Wound base appears to be fascia-cannot r/o periosteum  NEUROLOGIC:  He is awake, alert, and oriented.  Speech is fluent without   dysarthria.  Cranial nerves are intact.  He is moving all extremities.  PSYCHIATRIC:  Behavior is normal.  Affect is normal.  Mood is normal.    LABORATORY DATA:  Current labs show white blood cell count of 5.5.  He has no   left shift.  Platelets are 247.    Sodium is 135, potassium 4.2, chloride 103, bicarbonate 23, glucose 137, BUN   37, and creatinine 0.58.    ProBNP was 2906.    Ankle culture done on 01/07 showed few wbc's, rare gram negative rods, and   rare yeast.  The gram-negative christin was identified as Klebsiella pneumoniae and   is carbapenemase resistant and multidrug resistant; yeast has not been   identified.    ASSESSMENT AND PLAN:  This is a 54-year-old diabetic male with known   peripheral vascular disease, recent below-the-knee amputation due to gangrene   and nonhealing ulcerations of his left lower extremity as well as cardiac   catheterization due to  myocardial infarction and ischemic cardiomyopathy and   developed pressure ulceration on his right heel.  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  It is also showing yeast.  He has   no clinical signs of infection.  The wound had decreased slightly in size.    There was no surrounding erythema, swelling, drainage, or purulence.  When   visualized today, it appeared to have intact fascia at the base; however,   cannot rule out the possibility of going to bone.  It is unclear what exactly   was swabbed as there is no active drainage or purulence and the wound is   clinically not infected at this time.  The patient; however, has multiple   comorbidities including vascular disease and previously poorly-controlled   diabetes, so he is certainly at high risk for development of progression to active infection.    He will need scrupulous wound care and daily monitoring of the   wound for any development of drainage, erythema, etc.  If the wound fails to   heal, he would require MRI and bone biopsy and culture to determine   which pathogen is causing infection as wound swabs are notoriously unreliable   to determine the actual causative pathogen for osteomyelitis.  The case was   discussed with infection control as this is a multidrug resistant organism and   carbapenem-resistant Enterobacteriaceae .  It is resistant to almost   all known antibiotics.  There is susceptibility only to tetracylines such as minocycline and   polymyxin.  He would require hospitalization for IV antibiotics as   well as orthopedic management.  He is at high risk for antibiotic failure.  In   view of his comorbidities as well as a pathogen identified, if he develops active infection due to this  Klebsiella, he would likely require surgical   treatment of his osteo. All this been discussed with the patient.  He has   agreed that  he will be diligent in monitoring his wound daily.  He will go to   wound care.  He will keep the wound covered at all times. He will improve his diabetes compliance and blood sugar control. I have discussed with infection control, pharmacy, and hospitalist.  He is planned for discharge.  Follow   up in ID clinic and with Ortho as needed. Follow up wound care as scheduled.       ____________________________________     MD CHAZ ALMONTE / FLORECITA    DD:  01/10/2020 13:04:45  DT:  01/10/2020 16:56:54    D#:  [id number]  Job#:  [id number]

## 2020-01-11 NOTE — DISCHARGE INSTRUCTIONS
Discharge Instructions    Discharged to home by car with relative. Discharged via wheelchair, hospital escort: Yes.  Special equipment needed: Wheelchair    Be sure to schedule a follow-up appointment with your primary care doctor or any specialists as instructed.     Discharge Plan:   Influenza Vaccine Indication: Patient Refuses    I understand that a diet low in cholesterol, fat, and sodium is recommended for good health. Unless I have been given specific instructions below for another diet, I accept this instruction as my diet prescription.   Other diet: ADA Regular Diet       · Is patient discharged on Warfarin / Coumadin?   No     Depression / Suicide Risk    As you are discharged from this [facility] facility, it is important to learn how to keep safe from harming yourself.    Recognize the warning signs:  · Abrupt changes in personality, positive or negative- including increase in energy   · Giving away possessions  · Change in eating patterns- significant weight changes-  positive or negative  · Change in sleeping patterns- unable to sleep or sleeping all the time   · Unwillingness or inability to communicate  · Depression  · Unusual sadness, discouragement and loneliness  · Talk of wanting to die  · Neglect of personal appearance   · Rebelliousness- reckless behavior  · Withdrawal from people/activities they love  · Confusion- inability to concentrate     If you or a loved one observes any of these behaviors or has concerns about self-harm, here's what you can do:  · Talk about it- your feelings and reasons for harming yourself  · Remove any means that you might use to hurt yourself (examples: pills, rope, extension cords, firearm)  · Get professional help from the community (Mental Health, Substance Abuse, psychological counseling)  · Do not be alone:Call your Safe Contact- someone whom you trust who will be there for you.  · Call your local CRISIS HOTLINE 651-6030 or 923-467-1042  · Call your local  Children's Mobile Crisis Response Team [place] (477) 220-1829 or [email or web address]  · Call the toll free National Suicide Prevention Hotlines   · National Suicide Prevention Lifeline 779-749-RFAP (9297)  · National Hope Line Network 800-SUICIDE (270-7095)

## 2020-01-12 LAB
BACTERIA WND AEROBE CULT: ABNORMAL
GRAM STN SPEC: ABNORMAL
SIGNIFICANT IND 70042: ABNORMAL
SITE SITE: ABNORMAL
SOURCE SOURCE: ABNORMAL

## 2020-01-12 NOTE — CONSULTS
DATE OF SERVICE:  01/09/2020    BEHAVIORAL MEDICINE EVALUATION    BRIEF HISTORY OF PRESENTING COMPLAINTS:  The patient is a 54-year-old     male who is referred for behavioral medicine evaluation by Dr. Bai.  The patient was transferred to rehab from acute where he was   [place] [facility] [place] with worsening gangrene of the left foot.  The   patient underwent a BKA.  He was stabilized acutely and then sent to rehab to   address his general debility.    PAST MEDICAL HISTORY:  Significant for  diabetes.    PSYCHOLOGICAL STATUS:  MENTAL STATUS EXAMINATION:  The patient is a well-nourished, medium-built male   of medium stature who appeared his stated age of 54.  At presentation, the   patient was alert.  He was sitting in a wheelchair next to his bed when   approached.  The patient oriented well to my presence.  The patient was kempt   in appearance.  He was dressed casually in street attire that was appropriate   to his age and setting.  The patient's manner of presentation was cooperative   and friendly.    The patient was well oriented to time, place, and person.  His language was   logical and goal oriented.  Speech was normal for rate and rhythm.  The   patient's concentration and memory functioning appeared intact.    The patient's affect was slightly constricted, stable, and mildly intense.  He   related well.  His mood appeared somewhat anxious, but appropriate to the   context.    There was no evidence of delusional or perceptual disturbance.  Also, the   patient showed no unusual pain or motor behavior during the interview.    SPECIFIC BEHAVIORAL COMPLAINTS:  Patient denied any clinically significant   symptoms of a negative mood.  He reported no strong feelings of depression,   anxiety, or anger.  He also reported no problems with his appetite and he said   his pain is very minimal.  He is not asking for pain medications.  The   patient also reported his energy levels are returning  and his sleep is good.    The patient denied any interpersonal discord or discomfort, family disharmony   or problems managing his day-to-day stressors prior to his hospital admission.    The patient also reported no ETOH use or past problems with alcohol.  He also   denied any illicit drug use or any use of tobacco.    PSYCHIATRIC HISTORY:  The patient denied any history significant for   psychiatric disturbance or treatment including in or outpatient care.    PSYCHOMETRIC TESTING:  The patient was administered 2 psychometric tests and 1   screening instrument.  The PS/PC-R revealed no clinically significant   symptoms of a negative mood.  His CDR survey showed no problems with level of   consciousness, attention, thinking, perception, speech, or memory.  He did   report problems with behavioral activation and basic self-care.  He denied any   mood disturbance.  Finally, the patient reported some slight loss of vigor,   but he denied any sleep problems, difficulties with his appetite, or   significant pain.    The patient was screened for any risk of suicide based on history of suicide   attempts, acute suicidal ideation, severe hopelessness, attraction to death,   family history of suicide, and acute overuse of alcohol.  The patient's risk   is considered low.    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  He   is  and lives with his wife and children in [place], [place].    IMPRESSION:  Minor adjustment difficulties.    RECOMMENDATIONS:  Patient will be followed for status and supportive care.       ____________________________________     SHANKAR ROJAS, PHD    JOSSE / FLORECITA    DD:  01/12/2020 10:02:44  DT:  01/12/2020 10:20:46    D#:  [id number]  Job#:  [id number]

## 2020-01-13 NOTE — DISCHARGE PLANNING
Spoke with patient's wife and son today.  They have patient scheduled for PCP on Friday so no further action needed.

## 2020-01-14 ENCOUNTER — HOME CARE VISIT (OUTPATIENT)
Dept: HOME HEALTH SERVICES | Facility: HOME HEALTHCARE | Age: 55
End: 2020-01-14
Payer: COMMERCIAL

## 2020-01-14 VITALS
HEART RATE: 90 BPM | HEIGHT: 72 IN | RESPIRATION RATE: 16 BRPM | DIASTOLIC BLOOD PRESSURE: 84 MMHG | OXYGEN SATURATION: 99 % | BODY MASS INDEX: 20.99 KG/M2 | WEIGHT: 155 LBS | TEMPERATURE: 96.8 F | SYSTOLIC BLOOD PRESSURE: 123 MMHG

## 2020-01-14 PROCEDURE — 665001 SOC-HOME HEALTH

## 2020-01-14 PROCEDURE — 6650537 HCR  CLEANSER ANTISEPTIC HAND FOAM 1.6OZ

## 2020-01-14 PROCEDURE — G0493 RN CARE EA 15 MIN HH/HOSPICE: HCPCS

## 2020-01-14 NOTE — DISCHARGE SUMMARY
Rehab Discharge Summary    Admission Date: 12/27/2019    Discharge Date: 1/10/2020    Attending Provider: Oliverio Bai MD/PhD    Admission Diagnosis:   Active Hospital Problems    Diagnosis   • PVD (peripheral vascular disease) (HCC)   • Non-healing wound of right heel   • Orthostatic hypotension   • CAD (coronary artery disease)   • HTN (hypertension)   • Diabetic polyneuropathy associated with type 2 diabetes mellitus (HCC)   • Hyperlipidemia   • Uncontrolled type 2 diabetes mellitus with hyperglycemia (HCC)       Discharge Diagnosis:  Active Hospital Problems    Diagnosis   • PVD (peripheral vascular disease) (HCC)   • Non-healing wound of right heel   • Orthostatic hypotension   • CAD (coronary artery disease)   • HTN (hypertension)   • Diabetic polyneuropathy associated with type 2 diabetes mellitus (HCC)   • Hyperlipidemia   • Uncontrolled type 2 diabetes mellitus with hyperglycemia (HCC)       HPI per H&P:  Patient is a 54 year-old male with a past medical history significant for DM, HTN, CAD, HLD and Hx of diabetic foot ulcer  ad[facility] on 12/16/2019  3:11 AM with worsening gangrene to the left foot. Patient was previously seen in 8/2019 at which time he was suggested to have the BKA but patient was discharge on IV antibiotics.  Patient reportedly completed his IV antibiotics but developed worsening symptoms as well as shortness of breath.  Orthopedic surgeon recommended BKA and Cardiology was consulted for elevated cardiac enzymes with concern for NSTEMI and found to have a new EF of 30-35%.  Cardiology optimized his medication and then he underwent L BKA on 12/20/19 with Dr. Zhao.  ID and Cardiology have since signed off the case and patient is no longer on antibiotics. Patient's hemoglobin has been stable in the 9's.      Patient was last evaluated by OT on 12/26/19 and was Hari for ADLs.  Patient was last evaluated by PT on 12/24/19 and was Hari for gait. Patient  was previously living in a 1 story home with 1 step to enter; living with spouse who works days.     Patient was admitted to [facility] on 12/27/2019.     Hospital Course by Problem List:  L BKA - Patient with MDRO osteomyelitis to left foot now s/p Left BKA with Dr. Zhao on 12/20/19. Patient underwent acute inpatient rehabilitation from 12/27/19 to 1/10/20 with good improvement in mobility and ADLs.   -NWB LLE. IPOP to LLE     R ankle wound - Patient with ankle wound being managed by wound consult. With exposed tissue including bone after debridement.  -Recommending outpatient clinic visit with Dr. Chatterjee. Consult ID for antibiotic recommendation. Discharge delayed,   -Wound culture ordered 1/7/20 - gram stain with few gram negative rods and rare yeast. Culture with concern for Klebsiella with multiple resistances. Yeast still pending. Long discussion with ID team, hospitalist and family.       Neuropathic pain - Patient would prefer no medications. PT working on desensitization and mirror therapy.      DM - Patient on Glipizide 5 mg qAM, Metformin 1000 mg BID and fingersticks   -Consult Hospitalist. Discontinued Lantus and increase Glipizide. Stopped Januvia per hospitalist     HTN/CV/PAD - Patient on ASA 81 mg and Plavix 75 QPM. Patient with recent TTE with EF of 40%. Patient on Coreg 6.25 mg, Spironolactone 25 mg daily, and Valsartan 40 mg BID  -Ongoing Hypotension, discussed with hospitalist. Decrease Spironolactone 12.5 mg daily. Discontinue Valsartan. Coreg discontinued, now with tachycardia   -Start Abdominal binder. Discontinue Spironolactone. Midodrine discontinued.      HLD - Patient on Atorvastatin 80 mg QHS     Anemia - Patient on Fe supplement on transfer. Check AM CBC - 10 on admission.      Vitamin D - deficient on admission. Start 1000 U      DVT Ppx - Patient on Heparin on transfer. Hopping > 100 feet, discontinue Heparin.      Dispo - Patient's discharge delayed at this time  "pending follow-up. Follow-up with Ortho on 1/10/20. No antibiotics at this time, discharge home on 1/10/20    Functional Status at Discharge  Eatin - Modified Independent  Eating Description:     Groomin - Standby Prompting/Supervision or Set-up  Grooming Description:  Adaptive equipment, Supervision for safety(standing with FWW)  Bathin - Modified Independent  Bathing Description:  Tub bench, Hand held shower  Upper Body Dressin - Independent  Upper Body Dressing Description:  ( tank top and long sleeve shirt)  Lower Body Dressin - Modified Independent  Lower Body Dressing Description:  6 - Modified Independent  Discharge Location : Home  Patient Discharging with Assist of: Family   Level of Supervision Required: No Supervision  Recommended Equipment for Discharge: Front-Wheeled Walker;Reacher  Recommended Services Upon Discharge: Home Health Occupational Therapy  Criteria for Termination of Services: Maximum Function Achieved for Inpatient Rehabilitation  Walk:  5 - Standby Prompting/Supervision or Set-up  Distance Walked:  Walks a minimum of 150 feet  Walk Description:  Extra time, Safety concerns, Verbal cueing, Supervision for safety, Walker(AMB x 220 feet with FWW and CGA/SBA)  Wheelchair:  6 - Modified Independent  Distance Propelled:  Propels a minimum of 150 feet   Wheelchair Description:  Adaptive equipment, Extra time(x150 feet with BUE, mod I)  Stairs 1 - Total Assistance  Stairs DescriptionWalker, Extra time, Safety concerns, Verbal cueing, Supervision for safety(Up/down 4\" platform step x 2 reps with FWW and CGA)  Discharge Location: Home  Patient Discharging with Assist of: Spouse / Significant Other  Level of Supervision Required Upon Discharge: Intermittent Supervision  Recommended Equipment for Discharge: Front-Wheeled Walker;Manual Wheelchair  Recommeded Services Upon Discharge: Home Health Physical Therapy;Outpatient Physical Therapy  Long Term Goals Met: 4  Long Term " Goals Not Met: 0  Reason(s) for Goals Not Met: N/A  Criteria for Termination of Services: Maximum Function Achieved for Inpatient Rehabilitation  Comprehension Mode:  Both  Comprehension:  7 - Independent  Comprehension Description:  (Might be more of a language barrier than comprehension skills.)  Expression Mode:  Both  Expression:  7 - Independent  Expression Description:     Social Interaction:  7 - Independent  Social Interaction Description:     Problem Solvin - Standby Prompting/Supervision or Set-up  Problem Solving Description:     Memory:  6 - Modified Independent  Memory Description:  Therapy schedule       I, Oliverio Bai M.D., personally performed a complete drug regimen review and no potential clinically significant medication issues were identified.   Discharge Medication:     Medication List      START taking these medications      Instructions   ascorbic acid 500 MG tablet  Commonly known as:  VITAMIN C  Notes to patient:  Take with breakfast and with dinner   Take 1 Tab by mouth 2 Times a Day.  Dose:  500 mg        CHANGE how you take these medications      Instructions   glipiZIDE 5 MG Tabs  What changed:  how much to take  Commonly known as:  GLUCOTROL  Notes to patient:  Take before breakfast   Doctor's comments:  To be managed by Rehab physicians  Take 2 Tabs by mouth every morning before breakfast.  Dose:  10 mg        CONTINUE taking these medications      Instructions   aspirin 81 MG EC tablet   Take 1 Tab by mouth every day.  Dose:  81 mg     atorvastatin 80 MG tablet  Commonly known as:  LIPITOR   Doctor's comments:  To be managed by Rehab physicians  Take 1 Tab by mouth every day.  Dose:  80 mg     clopidogrel 75 MG Tabs  Commonly known as:  PLAVIX   Take 1 Tab by mouth every bedtime.  Dose:  75 mg     ferrous sulfate 325 (65 Fe) MG tablet  Notes to patient:  Take with breakfast   Doctor's comments:  To be managed by Rehab physicians  Take 1 Tab by mouth every morning  with breakfast.  Dose:  325 mg     metformin 1000 MG tablet  Commonly known as:  GLUCOPHAGE  Notes to patient:  Take with breakfast and with dinner   Doctor's comments:  To be managed by Rehab physicians  Take 1 Tab by mouth 2 times a day, with meals.  Dose:  1,000 mg        STOP taking these medications    carvedilol 6.25 MG Tabs  Commonly known as:  COREG     enalaprilat 1.25 MG/ML Inj  Commonly known as:  VASOTEC     insulin glargine 100 UNIT/ML Sopn injection  Commonly known as:  insulin glargine     spironolactone 25 MG Tabs  Commonly known as:  ALDACTONE     valsartan 40 MG Tabs  Commonly known as:  DIOVAN            Discharge Diet:  Diabetic    Discharge Activity:  NWB LLE    Disposition:  Patient to discharge home with family support and community resources.     Equipment:  WC, FWW    Follow-up & Discharge Instructions:  Follow up with your primary care provider (PCP) within 7-10 days of discharge to review your medications and take over your care.     If you develop chest pain, fever, chills, change in neurologic function (weakness, sensation changes, vision changes), or other concerning sxs, seek immediate medical attention or call 911.      Condition on Discharge:  Jabari Bai M.D.    This note is for documentation purposes only.

## 2020-01-15 ASSESSMENT — ACTIVITIES OF DAILY LIVING (ADL): OASIS_M1830: 01

## 2020-01-15 ASSESSMENT — PATIENT HEALTH QUESTIONNAIRE - PHQ9
1. LITTLE INTEREST OR PLEASURE IN DOING THINGS: 00
2. FEELING DOWN, DEPRESSED, IRRITABLE, OR HOPELESS: 00
CLINICAL INTERPRETATION OF PHQ2 SCORE: 0

## 2020-01-16 ENCOUNTER — HOME CARE VISIT (OUTPATIENT)
Dept: HOME HEALTH SERVICES | Facility: HOME HEALTHCARE | Age: 55
End: 2020-01-16
Payer: COMMERCIAL

## 2020-01-16 VITALS
RESPIRATION RATE: 16 BRPM | DIASTOLIC BLOOD PRESSURE: 61 MMHG | OXYGEN SATURATION: 98 % | SYSTOLIC BLOOD PRESSURE: 100 MMHG | TEMPERATURE: 96.7 F | HEART RATE: 89 BPM

## 2020-01-16 PROCEDURE — G0151 HHCP-SERV OF PT,EA 15 MIN: HCPCS

## 2020-01-16 ASSESSMENT — ACTIVITIES OF DAILY LIVING (ADL): AMBULATION ASSISTANCE ON FLAT SURFACES: 1

## 2020-01-16 ASSESSMENT — ENCOUNTER SYMPTOMS: MUSCLE WEAKNESS: 1

## 2020-01-17 ENCOUNTER — HOME CARE VISIT (OUTPATIENT)
Dept: HOME HEALTH SERVICES | Facility: HOME HEALTHCARE | Age: 55
End: 2020-01-17
Payer: COMMERCIAL

## 2020-01-17 PROCEDURE — G0299 HHS/HOSPICE OF RN EA 15 MIN: HCPCS

## 2020-01-19 ENCOUNTER — HOME CARE VISIT (OUTPATIENT)
Dept: HOME HEALTH SERVICES | Facility: HOME HEALTHCARE | Age: 55
End: 2020-01-19
Payer: COMMERCIAL

## 2020-01-20 ENCOUNTER — HOME CARE VISIT (OUTPATIENT)
Dept: HOME HEALTH SERVICES | Facility: HOME HEALTHCARE | Age: 55
End: 2020-01-20
Payer: COMMERCIAL

## 2020-01-20 VITALS
SYSTOLIC BLOOD PRESSURE: 110 MMHG | RESPIRATION RATE: 16 BRPM | TEMPERATURE: 97 F | DIASTOLIC BLOOD PRESSURE: 62 MMHG | HEART RATE: 80 BPM | OXYGEN SATURATION: 100 %

## 2020-01-20 VITALS
RESPIRATION RATE: 16 BRPM | TEMPERATURE: 97.5 F | DIASTOLIC BLOOD PRESSURE: 62 MMHG | SYSTOLIC BLOOD PRESSURE: 103 MMHG | HEART RATE: 92 BPM | OXYGEN SATURATION: 98 %

## 2020-01-20 PROCEDURE — G0299 HHS/HOSPICE OF RN EA 15 MIN: HCPCS

## 2020-01-20 PROCEDURE — G0151 HHCP-SERV OF PT,EA 15 MIN: HCPCS

## 2020-01-20 ASSESSMENT — ENCOUNTER SYMPTOMS: MENTAL STATUS CHANGE: 0

## 2020-01-20 ASSESSMENT — ACTIVITIES OF DAILY LIVING (ADL): TRANSPORTATION COMMENTS: REQUIRES ASSIST OF ANOTHER PERSON AND WC OUT OF HOME

## 2020-01-21 ENCOUNTER — HOME CARE VISIT (OUTPATIENT)
Dept: HOME HEALTH SERVICES | Facility: HOME HEALTHCARE | Age: 55
End: 2020-01-21
Payer: COMMERCIAL

## 2020-01-21 VITALS
TEMPERATURE: 96.8 F | OXYGEN SATURATION: 99 % | DIASTOLIC BLOOD PRESSURE: 84 MMHG | RESPIRATION RATE: 17 BRPM | SYSTOLIC BLOOD PRESSURE: 130 MMHG | HEART RATE: 78 BPM

## 2020-01-21 VITALS
SYSTOLIC BLOOD PRESSURE: 108 MMHG | RESPIRATION RATE: 16 BRPM | OXYGEN SATURATION: 100 % | HEART RATE: 9 BPM | DIASTOLIC BLOOD PRESSURE: 64 MMHG | TEMPERATURE: 96.4 F

## 2020-01-21 PROCEDURE — G0152 HHCP-SERV OF OT,EA 15 MIN: HCPCS

## 2020-01-21 SDOH — ECONOMIC STABILITY: HOUSING INSECURITY: HOME SAFETY: EDUCATED PT RE: FALL RISK W/ THROW RUGS/AREA RUGS, PT VERBALIZED UNDERSTANDING.

## 2020-01-21 ASSESSMENT — ACTIVITIES OF DAILY LIVING (ADL)
PREPARING MEALS: NEEDS ASSISTANCE
TOILETING: 1
AMBULATION ASSISTANCE: 1
BATHING ASSESSED: 1
GROOMING EQUIPMENT USED: SEATED AT SINK
DRESSING_LB_CURRENT_FUNCTION: INDEPENDENT
GROOMING ASSESSED: 1
TOILETING: INDEPENDENT
GROOMING_CURRENT_FUNCTION: INDEPENDENT
BATHING_CURRENT_FUNCTION: STAND BY ASSIST
AMBULATION ASSISTANCE: SUPERVISION
DRESSING_UB_CURRENT_FUNCTION: INDEPENDENT
PHYSICAL TRANSFERS ASSESSED: 1

## 2020-01-22 ENCOUNTER — HOME CARE VISIT (OUTPATIENT)
Dept: HOME HEALTH SERVICES | Facility: HOME HEALTHCARE | Age: 55
End: 2020-01-22
Payer: COMMERCIAL

## 2020-01-22 ASSESSMENT — ACTIVITIES OF DAILY LIVING (ADL)
GROOMING_WITHIN_DEFINED_LIMITS: 1
CURRENT_FUNCTION: SUPERVISION
FEEDING_WITHIN_DEFINED_LIMITS: 1

## 2020-01-24 ENCOUNTER — ANTICOAGULATION MONITORING (OUTPATIENT)
Dept: VASCULAR LAB | Facility: MEDICAL CENTER | Age: 55
End: 2020-01-24

## 2020-01-24 ENCOUNTER — HOME CARE VISIT (OUTPATIENT)
Dept: HOME HEALTH SERVICES | Facility: HOME HEALTHCARE | Age: 55
End: 2020-01-24
Payer: COMMERCIAL

## 2020-01-24 NOTE — PROGRESS NOTES
Received referral from [facility]. Medications reviewed. No clinically significant interactions noted.

## 2020-01-26 SDOH — ECONOMIC STABILITY: HOUSING INSECURITY: HOME SAFETY: CONTACT PRECAUTION WITH BODY FLUIDS

## 2020-01-27 ENCOUNTER — HOME CARE VISIT (OUTPATIENT)
Dept: HOME HEALTH SERVICES | Facility: HOME HEALTHCARE | Age: 55
End: 2020-01-27
Payer: COMMERCIAL

## 2020-01-27 VITALS
DIASTOLIC BLOOD PRESSURE: 70 MMHG | TEMPERATURE: 96.6 F | HEART RATE: 97 BPM | SYSTOLIC BLOOD PRESSURE: 130 MMHG | RESPIRATION RATE: 16 BRPM | OXYGEN SATURATION: 99 %

## 2020-01-27 PROCEDURE — G0151 HHCP-SERV OF PT,EA 15 MIN: HCPCS

## 2020-01-27 PROCEDURE — G0299 HHS/HOSPICE OF RN EA 15 MIN: HCPCS

## 2020-01-27 ASSESSMENT — ENCOUNTER SYMPTOMS: MENTAL STATUS CHANGE: 0

## 2020-01-27 ASSESSMENT — ACTIVITIES OF DAILY LIVING (ADL): TRANSPORTATION COMMENTS: REQUIRES ASSIST OF ANOTHER PERSON AND AD OUT OF HOME ON UNEVEN SURFACES

## 2020-01-28 ENCOUNTER — OFFICE VISIT (OUTPATIENT)
Dept: CARDIOLOGY | Facility: MEDICAL CENTER | Age: 55
End: 2020-01-28
Payer: COMMERCIAL

## 2020-01-28 VITALS
DIASTOLIC BLOOD PRESSURE: 68 MMHG | RESPIRATION RATE: 16 BRPM | HEART RATE: 90 BPM | SYSTOLIC BLOOD PRESSURE: 100 MMHG | WEIGHT: 154 LBS | BODY MASS INDEX: 20.86 KG/M2 | OXYGEN SATURATION: 100 % | HEIGHT: 72 IN

## 2020-01-28 DIAGNOSIS — I73.9 PVD (PERIPHERAL VASCULAR DISEASE) (HCC): ICD-10-CM

## 2020-01-28 DIAGNOSIS — E78.2 MIXED HYPERLIPIDEMIA: ICD-10-CM

## 2020-01-28 DIAGNOSIS — I25.5 ISCHEMIC CARDIOMYOPATHY: ICD-10-CM

## 2020-01-28 DIAGNOSIS — I25.10 CORONARY ARTERY DISEASE INVOLVING NATIVE CORONARY ARTERY OF NATIVE HEART, ANGINA PRESENCE UNSPECIFIED: ICD-10-CM

## 2020-01-28 PROBLEM — S88.119A BELOW-KNEE AMPUTATION (HCC): Status: ACTIVE | Noted: 2020-01-17

## 2020-01-28 PROBLEM — F43.21 ADJUSTMENT DISORDER WITH DEPRESSED MOOD: Status: ACTIVE | Noted: 2020-01-17

## 2020-01-28 PROCEDURE — 99214 OFFICE O/P EST MOD 30 MIN: CPT | Performed by: NURSE PRACTITIONER

## 2020-01-28 RX ORDER — CLOPIDOGREL BISULFATE 75 MG/1
75 TABLET ORAL
Qty: 30 TAB | Refills: 11 | Status: SHIPPED | OUTPATIENT
Start: 2020-01-28 | End: 2020-12-08

## 2020-01-28 RX ORDER — CARVEDILOL 6.25 MG/1
TABLET ORAL
COMMUNITY
Start: 2020-01-17 | End: 2020-01-28

## 2020-01-28 RX ORDER — GLIPIZIDE 5 MG/1
2.5 TABLET ORAL DAILY
COMMUNITY
Start: 2018-12-10 | End: 2020-12-08

## 2020-01-28 RX ORDER — VALSARTAN 40 MG/1
TABLET ORAL
COMMUNITY
Start: 2020-01-17 | End: 2020-01-28

## 2020-01-28 RX ORDER — SPIRONOLACTONE 25 MG/1
TABLET ORAL
COMMUNITY
Start: 2020-01-17 | End: 2020-01-28

## 2020-01-28 RX ORDER — INSULIN GLARGINE 100 [IU]/ML
INJECTION, SOLUTION SUBCUTANEOUS
COMMUNITY
Start: 2020-01-17 | End: 2020-01-28

## 2020-01-28 RX ORDER — ENALAPRILAT 1.25 MG/ML
INJECTION INTRAVENOUS
COMMUNITY
Start: 2020-01-17 | End: 2020-01-28

## 2020-01-28 RX ORDER — CARVEDILOL 3.12 MG/1
3.12 TABLET ORAL 2 TIMES DAILY WITH MEALS
Qty: 60 TAB | Refills: 11 | Status: SHIPPED
Start: 2020-01-28 | End: 2020-05-13

## 2020-01-28 ASSESSMENT — MINNESOTA LIVING WITH HEART FAILURE QUESTIONNAIRE (MLHF)
WORKING AROUND THE HOUSE OR YARD DIFFICULT: 0
DIFFICULTY SOCIALIZING WITH FAMILY OR FRIENDS: 0
HAVING TO SIT OR LIE DOWN DURING THE DAY: 0
DIFFICULTY WITH RECREATIONAL PASTIMES, SPORTS, HOBBIES: 0
TIRED, FATIGUED OR LOW ON ENERGY: 0
DIFFICULTY WITH SEXUAL ACTIVITIES: 0
DIFFICULTY GOING AWAY FROM HOME: 0
GIVING YOU SIDE EFFECTS FROM TREATMENTS: 0
EATING LESS FOODS YOU LIKE: 0
WALKING ABOUT OR CLIMBING STAIRS DIFFICULT: 0
FEELING LIKE A BURDEN TO FAMILY AND FRIENDS: 0
COSTING YOU MONEY FOR MEDICAL CARE: 0
DIFFICULTY TO CONCENTRATE OR REMEMBERING THINGS: 0
MAKING YOU WORRY: 0
DIFFICULTY SLEEPING WELL AT NIGHT: 0
DIFFICULTY WORKING TO EARN A LIVING: 0
LOSS OF SELF CONTROL IN YOUR LIFE: 0
MAKING YOU FEEL DEPRESSED: 0
MAKING YOU SHORT OF BREATH: 0
TOTAL_SCORE: 0
SWELLING IN ANKLES OR LEGS: 0
MAKING YOU STAY IN A HOSPITAL: 0

## 2020-01-28 ASSESSMENT — ENCOUNTER SYMPTOMS
ABDOMINAL PAIN: 0
DIZZINESS: 0
PND: 0
ORTHOPNEA: 0
SHORTNESS OF BREATH: 0
MYALGIAS: 0
PALPITATIONS: 0
CLAUDICATION: 0
FEVER: 0
COUGH: 0

## 2020-01-28 ASSESSMENT — 6 MINUTE WALK TEST (6MWT): TOTAL DISTANCE WALKED (METERS): 0

## 2020-01-28 NOTE — PROGRESS NOTES
Chief Complaint   Patient presents with   • Congestive Heart Failure       Subjective:   Israel Aviles is a 54 y.o. male who presents today for follow-up on his cardiomyopathy with his wife, Nicki.    New patient to the office.  He had a recent hospitalization from 12/16/2019 through 12/27/2019 then went to rehab from 12/27/2019 through 1/10/2020.  He presented to the emergency room with left foot diabetic ulcer and black toes.  X-ray demonstrated osteomyelitis.  Patient also complained of shortness of breath and was found to have elevated troponin and diffuse T wave inversion on EKG.  Echocardiogram showed an LVEF of 30 to 35%.  Cardiology was consulted and recommended heart cath.  Patient was found to have multivessel disease.  He was started on guideline directed medical therapy.  Repeat echo showed improvement of EF to 45% and no evidence of thrombus.  CT surgery was consulted and was determined to be not a candidate due to poor distal targets for bypass.  Patient does have collateral circulation.  Patient was recommended for viability study once discharged.    Patient reports feeling well. Patient feels well, denies chest pain, shortness of breath, palpitations, dizziness/lightheadedness, orthopnea, PND or Edema.     He is not weighing himself at home.    Patient and wife report that his carvedilol, valsartan or enalapril, spironolactone were discontinued at rehab due to low blood pressure and symptoms.    Pt is using a walker to get around after his Left BKA.     Additonally, patient has the following medical problems:    -PVD    -Type 2 diabetes    -History of CVA    -Diabetic ulcer, s/p left BKA    -Anemia    Past Medical History:   Diagnosis Date   • Diabetes (HCC)      Past Surgical History:   Procedure Laterality Date   • KNEE AMPUTATION BELOW Left 12/20/2019    Procedure: AMPUTATION, BELOW KNEE;  Surgeon: Koby Zhao M.D.;  Location: SURGERY [facility];  Service: Orthopedics   •  IRRIGATION & DEBRIDEMENT ORTHO Left 6/24/2019    Procedure: IRRIGATION AND DEBRIDEMENT, WOUND-FOOT, BIOLOGIC PLACEMENT, WOUND VAC PLACEMENT;  Surgeon: Charles Chopra M.D.;  [place] [facility]  Service: Orthopedics     No family history on file.  Social History     Socioeconomic History   • Marital status:      Spouse name: Not on file   • Number of children: Not on file   • Years of education: Not on file   • Highest education level: Not on file   Occupational History   • Not on file   Social Needs   • Financial resource strain: Not on file   • Food insecurity:     Worry: Not on file     Inability: Not on file   • Transportation needs:     Medical: Not on file     Non-medical: Not on file   Tobacco Use   • Smoking status: Never Smoker   • Smokeless tobacco: Never Used   Substance and Sexual Activity   • Alcohol use: No   • Drug use: No   • Sexual activity: Not on file   Lifestyle   • Physical activity:     Days per week: Not on file     Minutes per session: Not on file   • Stress: Not on file   Relationships   • Social connections:     Talks on phone: Not on file     Gets together: Not on file     Attends Protestant service: Not on file     Active member of club or organization: Not on file     Attends meetings of clubs or organizations: Not on file     Relationship status: Not on file   • Intimate partner violence:     Fear of current or ex partner: Not on file     Emotionally abused: Not on file     Physically abused: Not on file     Forced sexual activity: Not on file   Other Topics Concern   • Not on file   Social History Narrative   • Not on file     No Known Allergies  Outpatient Encounter Medications as of 1/28/2020   Medication Sig Dispense Refill   • aspirin 81 MG EC tablet Take 1 Tab by mouth every day. 30 Tab 3   • clopidogrel (PLAVIX) 75 MG Tab Take 1 Tab by mouth every bedtime. 30 Tab 2   • ferrous sulfate 325 (65 Fe) MG tablet Take 1 Tab by mouth every morning with breakfast. 30  Tab 1   • atorvastatin (LIPITOR) 80 MG tablet Take 1 Tab by mouth every day. 90 Tab 2   • glipiZIDE (GLUCOTROL) 5 MG Tab Take 2 Tabs by mouth every morning before breakfast. 60 Tab 2   • metformin (GLUCOPHAGE) 1000 MG tablet Take 1 Tab by mouth 2 times a day, with meals. 60 Tab 2   • ascorbic acid (VITAMIN C) 500 MG tablet Take 1 Tab by mouth 2 Times a Day. 60 Tab 1   • valsartan (DIOVAN) 40 MG Tab VALSARTAN 40 MG TABS     • spironolactone (ALDACTONE) 25 MG Tab SPIRONOLACTONE 25 MG TABS     • insulin glargine (LANTUS) 100 UNIT/ML Solution LANTUS 100 UNIT/ML SOLN     • enalaprilat (VASOTEC) 1.25 MG/ML Injection ENALAPRILAT 1.25 MG/ML INJ     • carvedilol (COREG) 6.25 MG Tab CARVEDILOL 6.25 MG TABS       No facility-administered encounter medications on file as of 1/28/2020.      Review of Systems   Constitutional: Negative for fever and malaise/fatigue.   Respiratory: Negative for cough and shortness of breath.    Cardiovascular: Negative for chest pain, palpitations, orthopnea, claudication, leg swelling and PND.   Gastrointestinal: Negative for abdominal pain.   Musculoskeletal: Negative for myalgias.        Left BKA   Neurological: Negative for dizziness.   All other systems reviewed and are negative.       Objective:   /68 (BP Location: Left arm, Patient Position: Sitting, BP Cuff Size: Adult)   Pulse 90   Resp 16   Ht 1.829 m (6')   Wt 69.9 kg (154 lb)   SpO2 100%   BMI 20.89 kg/m²     Physical Exam   Constitutional: He is oriented to person, place, and time. He appears well-developed and well-nourished.   HENT:   Head: Normocephalic and atraumatic.   Eyes: Pupils are equal, round, and reactive to light. EOM are normal.   Neck: Normal range of motion. Neck supple. No JVD present.   Cardiovascular: Normal rate, regular rhythm and normal heart sounds.   Pulmonary/Chest: Effort normal and breath sounds normal. No respiratory distress. He has no wheezes. He has no rales.   Abdominal: Soft. Bowel sounds  are normal.   Musculoskeletal:         General: No edema.      Comments: Left BKA.    Neurological: He is alert and oriented to person, place, and time.   Skin: Skin is warm and dry.   Psychiatric: He has a normal mood and affect. His behavior is normal.   Vitals reviewed.    Lab Results   Component Value Date/Time    CHOLSTRLTOT 262 (H) 06/15/2018 10:20 PM     (H) 06/15/2018 10:20 PM    HDL 54 06/15/2018 10:20 PM    TRIGLYCERIDE 132 06/15/2018 10:20 PM       Lab Results   Component Value Date/Time    SODIUM 135 01/08/2020 05:48 AM    POTASSIUM 4.2 01/08/2020 05:48 AM    CHLORIDE 103 01/08/2020 05:48 AM    CO2 23 01/08/2020 05:48 AM    GLUCOSE 137 (H) 01/08/2020 05:48 AM    BUN 37 (H) 01/08/2020 05:48 AM    CREATININE 0.58 01/08/2020 05:48 AM    CREATININE 0.9 05/09/2007 01:20 AM     Lab Results   Component Value Date/Time    ALKPHOSPHAT 96 12/28/2019 05:54 AM    ASTSGOT 29 12/28/2019 05:54 AM    ALTSGPT 54 (H) 12/28/2019 05:54 AM    TBILIRUBIN 0.5 12/28/2019 05:54 AM      Transthoracic Echo Report 6/17/2018  Normal left ventricular systolic function.  Mild concentric left ventricular hypertrophy.  No significant valve abnormalities.   No prior study is available for comparison.    Transthoracic Echo Report 12/16/2019  Severely reduced left ventricular systolic function. Left ventricular   ejection fraction is visually estimated to be 30-35%.   There appears to be a linear echodensity which is adjacent to the LV apex concerning for possible small LV thrombus. May consider repeat limted echo in 1-2 days for re-evaluation if clinically indicated.    Normal inferior vena cava size and inspiratory collapse.  Indeterminate diastolic function.  Aortic sclerosis without stenosis.  Dr. Segovia is aware of the above findings.     Heart Cath 12/16/2019  Conclusions    1. Severe LV systolic dysfunction by noninvasive evaluation.    2. Severe and diffuse obstructive three-vessel coronary artery disease.    3. Normal  LVEDP.     Post-Procedure Diagnosis    Ischemic cardiomyopathy.     Recommendations    * Medical therapy of coronary artery disease and ischemic cardiomyopathy.  Consider nonurgent and staged revascularization, preferably bypass surgery.    * Would be reasonable to proceed with planned amputation of the foot prior  to coronary artery revascularization.  If this path is elected it would be at  increased risk of perioperative cardiovascular complications and I would  recommend perioperative antiplatelet medication statin therapy beta-blockade  and close attention to avoid hemodynamic stressors for excess blood loss/fluid  shifts.     Transthoracic Echo Report 12/18/2019  Limited Exam for LV Apical Thrombus.   No thrombus. Mildly reduced left ventricular systolic function.  Left ventricular ejection fraction is visually estimated to be 45%.  There is apical septum and apical akinesis.     Compared to the Prior Echo on 12/16/19: The LVEF has improved from 35%   to 45%. There was no LV thrombus visualized on either study.     Assessment:     1. Ischemic cardiomyopathy  carvedilol (COREG) 3.125 MG Tab   2. Coronary artery disease involving native coronary artery of native heart, angina presence unspecified  carvedilol (COREG) 3.125 MG Tab    clopidogrel (PLAVIX) 75 MG Tab   3. PVD (peripheral vascular disease) ([facility])     4. Mixed hyperlipidemia         Medical Decision Making:  Today's Assessment / Status / Plan:   1.  Ischemic cardiomyopathy: LVEF 45%: Based on physical examination findings, patient is euvolemic. No JVD, lungs are clear to auscultation, no pitting edema in bilateral lower extremities, no ascites.  -Start carvedilol 3.125 mg twice a day  -Patient to monitor blood pressure and contact office if low BP or has symptoms  -will attempt to restart ACE/ARB, Aldactone if BP allows in the future  -Reinforced s/sx of worsening heart failure, symptoms with patient and weight monitoring. Pt verbalizes understanding.  Pt to call office or RTC if present.     2.  CAD, multivessel disease/HLD:  -Patient to schedule viability study  -Continue aspirin 81 mg daily  -Continue clopidogrel 75 mg daily  -Continue atorvastatin 80 mg daily    3.  PVD, s/p BKA:  -per above  -follow up with surgeon    4.  Diabetes:  -Continue follow-up with PCP  -Continue glipizide, metformin    FU in clinic in 3 weeks with Dr. Segovia after Viability study. Sooner if needed.    Patient verbalizes understanding and agrees with the plan of care.     Collaborating MD: Kem Godinez MD

## 2020-01-29 VITALS
SYSTOLIC BLOOD PRESSURE: 130 MMHG | TEMPERATURE: 96.6 F | RESPIRATION RATE: 16 BRPM | HEART RATE: 80 BPM | OXYGEN SATURATION: 97 % | DIASTOLIC BLOOD PRESSURE: 70 MMHG

## 2020-01-31 ENCOUNTER — HOME CARE VISIT (OUTPATIENT)
Dept: HOME HEALTH SERVICES | Facility: HOME HEALTHCARE | Age: 55
End: 2020-01-31
Payer: COMMERCIAL

## 2020-02-03 ENCOUNTER — HOME CARE VISIT (OUTPATIENT)
Dept: HOME HEALTH SERVICES | Facility: HOME HEALTHCARE | Age: 55
End: 2020-02-03
Payer: COMMERCIAL

## 2020-02-03 PROCEDURE — G0299 HHS/HOSPICE OF RN EA 15 MIN: HCPCS

## 2020-02-04 ENCOUNTER — HOME CARE VISIT (OUTPATIENT)
Dept: HOME HEALTH SERVICES | Facility: HOME HEALTHCARE | Age: 55
End: 2020-02-04
Payer: COMMERCIAL

## 2020-02-05 ENCOUNTER — HOME CARE VISIT (OUTPATIENT)
Dept: HOME HEALTH SERVICES | Facility: HOME HEALTHCARE | Age: 55
End: 2020-02-05
Payer: COMMERCIAL

## 2020-02-05 VITALS
SYSTOLIC BLOOD PRESSURE: 128 MMHG | DIASTOLIC BLOOD PRESSURE: 68 MMHG | HEART RATE: 90 BPM | RESPIRATION RATE: 16 BRPM | OXYGEN SATURATION: 98 % | TEMPERATURE: 96.8 F

## 2020-02-06 ENCOUNTER — HOME CARE VISIT (OUTPATIENT)
Dept: HOME HEALTH SERVICES | Facility: HOME HEALTHCARE | Age: 55
End: 2020-02-06
Payer: COMMERCIAL

## 2020-02-06 DIAGNOSIS — I25.10 CORONARY ARTERY DISEASE INVOLVING NATIVE CORONARY ARTERY OF NATIVE HEART, ANGINA PRESENCE UNSPECIFIED: ICD-10-CM

## 2020-02-07 ENCOUNTER — APPOINTMENT (OUTPATIENT)
Dept: RADIOLOGY | Facility: MEDICAL CENTER | Age: 55
End: 2020-02-07
Attending: NURSE PRACTITIONER
Payer: COMMERCIAL

## 2020-02-09 ENCOUNTER — HOME CARE VISIT (OUTPATIENT)
Dept: HOME HEALTH SERVICES | Facility: HOME HEALTHCARE | Age: 55
End: 2020-02-09
Payer: COMMERCIAL

## 2020-02-10 ENCOUNTER — HOME CARE VISIT (OUTPATIENT)
Dept: HOME HEALTH SERVICES | Facility: HOME HEALTHCARE | Age: 55
End: 2020-02-10
Payer: COMMERCIAL

## 2020-02-10 VITALS
TEMPERATURE: 97 F | HEART RATE: 86 BPM | RESPIRATION RATE: 16 BRPM | SYSTOLIC BLOOD PRESSURE: 110 MMHG | DIASTOLIC BLOOD PRESSURE: 70 MMHG | OXYGEN SATURATION: 98 %

## 2020-02-10 PROCEDURE — A6214 FOAM DRG > 48 SQ IN W/BORDER: HCPCS

## 2020-02-10 PROCEDURE — A6403 STERILE GAUZE>16 <= 48 SQ IN: HCPCS

## 2020-02-10 PROCEDURE — A6219 GAUZE <= 16 SQ IN W/BORDER: HCPCS

## 2020-02-10 PROCEDURE — 6650300 HCR  CLEANSER 4-IN-1

## 2020-02-10 PROCEDURE — A6216 NON-STERILE GAUZE<=16 SQ IN: HCPCS

## 2020-02-10 PROCEDURE — A4450 NON-WATERPROOF TAPE: HCPCS

## 2020-02-10 PROCEDURE — G0299 HHS/HOSPICE OF RN EA 15 MIN: HCPCS

## 2020-02-10 ASSESSMENT — ENCOUNTER SYMPTOMS
VOMITING: NO
NAUSEA: NO

## 2020-02-10 ASSESSMENT — ACTIVITIES OF DAILY LIVING (ADL): AMBULATION ASSISTANCE: NON-AMBULATORY

## 2020-02-11 ENCOUNTER — HOME CARE VISIT (OUTPATIENT)
Dept: HOME HEALTH SERVICES | Facility: HOME HEALTHCARE | Age: 55
End: 2020-02-11
Payer: COMMERCIAL

## 2020-02-17 ENCOUNTER — HOME CARE VISIT (OUTPATIENT)
Dept: HOME HEALTH SERVICES | Facility: HOME HEALTHCARE | Age: 55
End: 2020-02-17
Payer: COMMERCIAL

## 2020-02-17 PROCEDURE — G0299 HHS/HOSPICE OF RN EA 15 MIN: HCPCS

## 2020-02-17 PROCEDURE — 665001 SOC-HOME HEALTH

## 2020-02-23 VITALS
RESPIRATION RATE: 16 BRPM | SYSTOLIC BLOOD PRESSURE: 122 MMHG | HEART RATE: 90 BPM | DIASTOLIC BLOOD PRESSURE: 66 MMHG | TEMPERATURE: 98.6 F | OXYGEN SATURATION: 98 %

## 2020-02-23 ASSESSMENT — ENCOUNTER SYMPTOMS
MUSCLE WEAKNESS: 1
NAUSEA: DENIES ANY

## 2020-02-24 ENCOUNTER — HOME CARE VISIT (OUTPATIENT)
Dept: HOME HEALTH SERVICES | Facility: HOME HEALTHCARE | Age: 55
End: 2020-02-24
Payer: COMMERCIAL

## 2020-02-24 PROCEDURE — G0299 HHS/HOSPICE OF RN EA 15 MIN: HCPCS

## 2020-02-27 VITALS
HEART RATE: 92 BPM | OXYGEN SATURATION: 98 % | TEMPERATURE: 97.8 F | SYSTOLIC BLOOD PRESSURE: 118 MMHG | RESPIRATION RATE: 17 BRPM | DIASTOLIC BLOOD PRESSURE: 70 MMHG

## 2020-02-27 ASSESSMENT — ENCOUNTER SYMPTOMS
MUSCLE WEAKNESS: 1
NAUSEA: DENIES ANY

## 2020-03-02 ENCOUNTER — TELEPHONE (OUTPATIENT)
Dept: CARDIOLOGY | Facility: MEDICAL CENTER | Age: 55
End: 2020-03-02

## 2020-03-02 ENCOUNTER — HOME CARE VISIT (OUTPATIENT)
Dept: HOME HEALTH SERVICES | Facility: HOME HEALTHCARE | Age: 55
End: 2020-03-02
Payer: COMMERCIAL

## 2020-03-02 PROCEDURE — G0299 HHS/HOSPICE OF RN EA 15 MIN: HCPCS

## 2020-03-03 ENCOUNTER — HOSPITAL ENCOUNTER (OUTPATIENT)
Dept: RADIOLOGY | Facility: MEDICAL CENTER | Age: 55
End: 2020-03-03
Attending: SURGERY
Payer: COMMERCIAL

## 2020-03-03 DIAGNOSIS — I73.9 PERIPHERAL VASCULAR DISEASE, UNSPECIFIED (HCC): ICD-10-CM

## 2020-03-03 DIAGNOSIS — I70.211 ATHEROSCLEROSIS OF NATIVE ARTERY OF RIGHT LOWER EXTREMITY WITH INTERMITTENT CLAUDICATION (HCC): ICD-10-CM

## 2020-03-03 PROCEDURE — 93926 LOWER EXTREMITY STUDY: CPT | Mod: RT

## 2020-03-03 PROCEDURE — 93922 UPR/L XTREMITY ART 2 LEVELS: CPT | Mod: 52,RT

## 2020-03-03 PROCEDURE — 93978 VASCULAR STUDY: CPT

## 2020-03-05 VITALS
SYSTOLIC BLOOD PRESSURE: 120 MMHG | OXYGEN SATURATION: 99 % | RESPIRATION RATE: 16 BRPM | DIASTOLIC BLOOD PRESSURE: 78 MMHG | TEMPERATURE: 97.6 F | HEART RATE: 78 BPM

## 2020-03-09 ENCOUNTER — HOME CARE VISIT (OUTPATIENT)
Dept: HOME HEALTH SERVICES | Facility: HOME HEALTHCARE | Age: 55
End: 2020-03-09
Payer: COMMERCIAL

## 2020-03-09 VITALS
RESPIRATION RATE: 16 BRPM | OXYGEN SATURATION: 98 % | DIASTOLIC BLOOD PRESSURE: 64 MMHG | HEART RATE: 82 BPM | SYSTOLIC BLOOD PRESSURE: 117 MMHG | TEMPERATURE: 97.6 F

## 2020-03-09 PROCEDURE — G0493 RN CARE EA 15 MIN HH/HOSPICE: HCPCS

## 2020-03-10 ASSESSMENT — ACTIVITIES OF DAILY LIVING (ADL): OASIS_M1830: 01

## 2020-03-10 ASSESSMENT — ENCOUNTER SYMPTOMS: NAUSEA: DENIES ANY

## 2020-03-10 ASSESSMENT — PATIENT HEALTH QUESTIONNAIRE - PHQ9: CLINICAL INTERPRETATION OF PHQ2 SCORE: 0

## 2020-03-12 ENCOUNTER — TELEPHONE (OUTPATIENT)
Dept: VASCULAR LAB | Facility: MEDICAL CENTER | Age: 55
End: 2020-03-12

## 2020-03-12 NOTE — TELEPHONE ENCOUNTER
Medications reviewed  No clinically significant interactions          Elise Harman, Clinical Pharmacist, CDE, CACP

## 2020-03-16 ENCOUNTER — HOME CARE VISIT (OUTPATIENT)
Dept: HOME HEALTH SERVICES | Facility: HOME HEALTHCARE | Age: 55
End: 2020-03-16
Payer: COMMERCIAL

## 2020-03-16 PROCEDURE — 665003 FOLLOW UP-HOME HEALTH

## 2020-03-16 PROCEDURE — G0299 HHS/HOSPICE OF RN EA 15 MIN: HCPCS

## 2020-03-18 ENCOUNTER — HOSPITAL ENCOUNTER (EMERGENCY)
Facility: MEDICAL CENTER | Age: 55
End: 2020-03-18
Attending: EMERGENCY MEDICINE
Payer: COMMERCIAL

## 2020-03-18 VITALS
BODY MASS INDEX: 19.89 KG/M2 | TEMPERATURE: 96 F | HEART RATE: 75 BPM | SYSTOLIC BLOOD PRESSURE: 156 MMHG | RESPIRATION RATE: 13 BRPM | WEIGHT: 160 LBS | DIASTOLIC BLOOD PRESSURE: 87 MMHG | HEIGHT: 75 IN | OXYGEN SATURATION: 100 %

## 2020-03-18 DIAGNOSIS — I95.1 ORTHOSTATIC SYNCOPE: ICD-10-CM

## 2020-03-18 DIAGNOSIS — E86.0 DEHYDRATION: ICD-10-CM

## 2020-03-18 LAB
ALBUMIN SERPL BCP-MCNC: 3.9 G/DL (ref 3.2–4.9)
ALBUMIN/GLOB SERPL: 1 G/DL
ALP SERPL-CCNC: 77 U/L (ref 30–99)
ALT SERPL-CCNC: 16 U/L (ref 2–50)
ANION GAP SERPL CALC-SCNC: 11 MMOL/L (ref 7–16)
AST SERPL-CCNC: 17 U/L (ref 12–45)
BASOPHILS # BLD AUTO: 0.7 % (ref 0–1.8)
BASOPHILS # BLD: 0.03 K/UL (ref 0–0.12)
BILIRUB SERPL-MCNC: 0.3 MG/DL (ref 0.1–1.5)
BUN SERPL-MCNC: 17 MG/DL (ref 8–22)
CALCIUM SERPL-MCNC: 9.3 MG/DL (ref 8.5–10.5)
CHLORIDE SERPL-SCNC: 103 MMOL/L (ref 96–112)
CO2 SERPL-SCNC: 24 MMOL/L (ref 20–33)
CREAT SERPL-MCNC: 0.7 MG/DL (ref 0.5–1.4)
EKG IMPRESSION: NORMAL
EOSINOPHIL # BLD AUTO: 0.04 K/UL (ref 0–0.51)
EOSINOPHIL NFR BLD: 0.9 % (ref 0–6.9)
ERYTHROCYTE [DISTWIDTH] IN BLOOD BY AUTOMATED COUNT: 47.1 FL (ref 35.9–50)
GLOBULIN SER CALC-MCNC: 4 G/DL (ref 1.9–3.5)
GLUCOSE SERPL-MCNC: 217 MG/DL (ref 65–99)
HCT VFR BLD AUTO: 38.9 % (ref 42–52)
HGB BLD-MCNC: 12.6 G/DL (ref 14–18)
IMM GRANULOCYTES # BLD AUTO: 0.01 K/UL (ref 0–0.11)
IMM GRANULOCYTES NFR BLD AUTO: 0.2 % (ref 0–0.9)
LYMPHOCYTES # BLD AUTO: 2.26 K/UL (ref 1–4.8)
LYMPHOCYTES NFR BLD: 50.2 % (ref 22–41)
MCH RBC QN AUTO: 29.6 PG (ref 27–33)
MCHC RBC AUTO-ENTMCNC: 32.4 G/DL (ref 33.7–35.3)
MCV RBC AUTO: 91.3 FL (ref 81.4–97.8)
MONOCYTES # BLD AUTO: 0.31 K/UL (ref 0–0.85)
MONOCYTES NFR BLD AUTO: 6.9 % (ref 0–13.4)
NEUTROPHILS # BLD AUTO: 1.85 K/UL (ref 1.82–7.42)
NEUTROPHILS NFR BLD: 41.1 % (ref 44–72)
NRBC # BLD AUTO: 0 K/UL
NRBC BLD-RTO: 0 /100 WBC
PLATELET # BLD AUTO: 152 K/UL (ref 164–446)
PMV BLD AUTO: 10.5 FL (ref 9–12.9)
POTASSIUM SERPL-SCNC: 4.2 MMOL/L (ref 3.6–5.5)
PROT SERPL-MCNC: 7.9 G/DL (ref 6–8.2)
RBC # BLD AUTO: 4.26 M/UL (ref 4.7–6.1)
SODIUM SERPL-SCNC: 138 MMOL/L (ref 135–145)
TROPONIN T SERPL-MCNC: 18 NG/L (ref 6–19)
WBC # BLD AUTO: 4.5 K/UL (ref 4.8–10.8)

## 2020-03-18 PROCEDURE — 84484 ASSAY OF TROPONIN QUANT: CPT

## 2020-03-18 PROCEDURE — 93005 ELECTROCARDIOGRAM TRACING: CPT

## 2020-03-18 PROCEDURE — 80053 COMPREHEN METABOLIC PANEL: CPT

## 2020-03-18 PROCEDURE — 93005 ELECTROCARDIOGRAM TRACING: CPT | Performed by: EMERGENCY MEDICINE

## 2020-03-18 PROCEDURE — 700105 HCHG RX REV CODE 258: Performed by: EMERGENCY MEDICINE

## 2020-03-18 PROCEDURE — 99284 EMERGENCY DEPT VISIT MOD MDM: CPT

## 2020-03-18 PROCEDURE — 36415 COLL VENOUS BLD VENIPUNCTURE: CPT

## 2020-03-18 PROCEDURE — 85025 COMPLETE CBC W/AUTO DIFF WBC: CPT

## 2020-03-18 RX ORDER — SODIUM CHLORIDE 9 MG/ML
1000 INJECTION, SOLUTION INTRAVENOUS ONCE
Status: COMPLETED | OUTPATIENT
Start: 2020-03-18 | End: 2020-03-18

## 2020-03-18 RX ADMIN — SODIUM CHLORIDE 1000 ML: 9 INJECTION, SOLUTION INTRAVENOUS at 11:14

## 2020-03-18 ASSESSMENT — LIFESTYLE VARIABLES
DOES PATIENT WANT TO STOP DRINKING: NO
TOTAL SCORE: 0
HOW MANY TIMES IN THE PAST YEAR HAVE YOU HAD 5 OR MORE DRINKS IN A DAY: 0
EVER HAD A DRINK FIRST THING IN THE MORNING TO STEADY YOUR NERVES TO GET RID OF A HANGOVER: NO
EVER FELT BAD OR GUILTY ABOUT YOUR DRINKING: NO
DO YOU DRINK ALCOHOL: NO
TOTAL SCORE: 0
ON A TYPICAL DAY WHEN YOU DRINK ALCOHOL HOW MANY DRINKS DO YOU HAVE: 0
HAVE YOU EVER FELT YOU SHOULD CUT DOWN ON YOUR DRINKING: NO
CONSUMPTION TOTAL: NEGATIVE
AVERAGE NUMBER OF DAYS PER WEEK YOU HAVE A DRINK CONTAINING ALCOHOL: 0
TOTAL SCORE: 0
HAVE PEOPLE ANNOYED YOU BY CRITICIZING YOUR DRINKING: NO

## 2020-03-18 ASSESSMENT — FIBROSIS 4 INDEX: FIB4 SCORE: 0.86

## 2020-03-18 NOTE — ED NOTES
"Discharge instructions given to patient. Education provided on diagnosis, treatment, and follow up provided. Pt verbalized understanding. All questions answered. IV removed. Pt taken to the lobby in wheelchair by family. /87   Pulse 75   Temp (!) 35.6 °C (96 °F) (Temporal)   Resp 13   Ht 1.905 m (6' 3\")   Wt 72.6 kg (160 lb)   SpO2 100%   BMI 20.00 kg/m²      "

## 2020-03-18 NOTE — ED TRIAGE NOTES
Chief Complaint   Patient presents with   • Syncope   • N/V     Patient bib ambulance from his podiatry appointment for a diabetic pressure ulcer wound check. Patient observed blood in his wound and then had a syncopal event. Patient reports these kind of episodes when he sees blood. Patient also reports having N/V at this time. EMS unable to preform orthostatic blood pressure due to patient having another syncopal event. Patient treated with 12.5 mg phenergan iv by ems. Patient arrives diaphoretic. EKG completed on arrival.

## 2020-03-18 NOTE — ED PROVIDER NOTES
ED Provider Note    CHIEF COMPLAINT  Chief Complaint   Patient presents with   • Syncope   • N/V       HPI  Israel Aviles is a 54 y.o. male who presents for evaluation of an episode of apparent syncope.  The patient has a history of underlying heart disease diabetes hypertension dyslipidemia.  He was at his podiatrist office earlier today for wound check.  He has no open sore on the underside of his right heel.  Apparently whenever he sees blood he becomes very nauseous and has had syncope during blood draws in the past.  He had an episode at the office.  He apparently did not lose his pulse.  He denies any chest pain.  He denies preceding palpitation shortness of breath fevers or chills.  He reports his blood sugars have been reasonably controlled.  He denies any focal neurological symptoms such as numbness weakness tingling to the arms legs or face or headache    REVIEW OF SYSTEMS  See HPI for further details.  No high fevers numbness weakness tingling chest pain shortness of breath all other systems are negative.     PAST MEDICAL HISTORY  Past Medical History:   Diagnosis Date   • CAD (coronary artery disease)    • Diabetes (HCC)    • Hyperlipidemia    • Hypertension        FAMILY HISTORY  No history of sudden cardiac death    SOCIAL HISTORY  Social History     Socioeconomic History   • Marital status:      Spouse name: Not on file   • Number of children: Not on file   • Years of education: Not on file   • Highest education level: Not on file   Occupational History   • Not on file   Social Needs   • Financial resource strain: Not on file   • Food insecurity     Worry: Not on file     Inability: Not on file   • Transportation needs     Medical: Not on file     Non-medical: Not on file   Tobacco Use   • Smoking status: Never Smoker   • Smokeless tobacco: Never Used   Substance and Sexual Activity   • Alcohol use: No   • Drug use: No   • Sexual activity: Not on file   Lifestyle   • Physical activity  "    Days per week: Not on file     Minutes per session: Not on file   • Stress: Not on file   Relationships   • Social connections     Talks on phone: Not on file     Gets together: Not on file     Attends Sabianist service: Not on file     Active member of club or organization: Not on file     Attends meetings of clubs or organizations: Not on file     Relationship status: Not on file   • Intimate partner violence     Fear of current or ex partner: Not on file     Emotionally abused: Not on file     Physically abused: Not on file     Forced sexual activity: Not on file   Other Topics Concern   • Not on file   Social History Narrative   • Not on file       SURGICAL HISTORY  Past Surgical History:   Procedure Laterality Date   • KNEE AMPUTATION BELOW Left 12/20/2019    Procedure: AMPUTATION, BELOW KNEE;  Surgeon: Koby Zhao M.D.;  Location: SURGERY [facility];  Service: Orthopedics   • ZZZ CARDIAC CATH  12/16/2019    multi-vessel disease   • IRRIGATION & DEBRIDEMENT ORTHO Left 6/24/2019    Procedure: IRRIGATION AND DEBRIDEMENT, WOUND-FOOT, BIOLOGIC PLACEMENT, WOUND VAC PLACEMENT;  Surgeon: Charles Chopra M.D.;  Location: SURGERY [facility];  Service: Orthopedics       CURRENT MEDICATIONS  Home Medications     Reviewed by Trudy Dawkins R.N. (Registered Nurse) on 03/18/20 at 1049  Med List Status: Partial   Medication Last Dose Status   ascorbic acid (VITAMIN C) 500 MG tablet  Active   aspirin 81 MG EC tablet  Active   atorvastatin (LIPITOR) 80 MG tablet  Active   carvedilol (COREG) 3.125 MG Tab  Active   clopidogrel (PLAVIX) 75 MG Tab  Active   ferrous sulfate 325 (65 Fe) MG tablet  Active   glipiZIDE (GLUCOTROL) 5 MG Tab  Active   metformin (GLUCOPHAGE) 1000 MG tablet  Active                ALLERGIES  No Known Allergies    PHYSICAL EXAM  VITAL SIGNS: /82   Pulse 77   Temp (!) 35.6 °C (96 °F) (Temporal)   Resp 14   Ht 1.905 m (6' 3\")   Wt 72.6 kg (160 lb)   BMI 20.00 kg/m²   "     Constitutional: Well developed, Well nourished, No acute distress, Non-toxic appearance.   HENT: Normocephalic, Atraumatic, Bilateral external ears normal, dry mucous membranes, No oral exudates, Nose normal.   Eyes: PERRLA, EOMI, Conjunctiva normal, No discharge.   Neck: Normal range of motion, No tenderness, Supple, No stridor.   Cardiovascular: Normal heart rate, Normal rhythm, No murmurs, No rubs, No gallops.   Thorax & Lungs: Normal breath sounds, No respiratory distress, No wheezing, No chest tenderness.   Abdomen: Bowel sounds normal, Soft, No tenderness, No masses, No pulsatile masses.   Skin: Warm, Dry, No erythema, No rash.   Extremities: Right lower extremity has a one by one shallow ulcer at the underside of the right heel.  No erythema no pus no exposed bone  Musculoskeletal: Good range of motion in all major joints. No tenderness to palpation or major deformities noted.   Neurologic: Alert & oriented x 3, Normal motor function, Normal sensory function, No focal deficits noted.   Psychiatric: Anxious    COURSE & MEDICAL DECISION MAKING  Pertinent Labs & Imaging studies reviewed. (See chart for details)  Results for orders placed or performed during the hospital encounter of 03/18/20   CBC WITH DIFFERENTIAL   Result Value Ref Range    WBC 4.5 (L) 4.8 - 10.8 K/uL    RBC 4.26 (L) 4.70 - 6.10 M/uL    Hemoglobin 12.6 (L) 14.0 - 18.0 g/dL    Hematocrit 38.9 (L) 42.0 - 52.0 %    MCV 91.3 81.4 - 97.8 fL    MCH 29.6 27.0 - 33.0 pg    MCHC 32.4 (L) 33.7 - 35.3 g/dL    RDW 47.1 35.9 - 50.0 fL    Platelet Count 152 (L) 164 - 446 K/uL    MPV 10.5 9.0 - 12.9 fL    Neutrophils-Polys 41.10 (L) 44.00 - 72.00 %    Lymphocytes 50.20 (H) 22.00 - 41.00 %    Monocytes 6.90 0.00 - 13.40 %    Eosinophils 0.90 0.00 - 6.90 %    Basophils 0.70 0.00 - 1.80 %    Immature Granulocytes 0.20 0.00 - 0.90 %    Nucleated RBC 0.00 /100 WBC    Neutrophils (Absolute) 1.85 1.82 - 7.42 K/uL    Lymphs (Absolute) 2.26 1.00 - 4.80 K/uL     Monos (Absolute) 0.31 0.00 - 0.85 K/uL    Eos (Absolute) 0.04 0.00 - 0.51 K/uL    Baso (Absolute) 0.03 0.00 - 0.12 K/uL    Immature Granulocytes (abs) 0.01 0.00 - 0.11 K/uL    NRBC (Absolute) 0.00 K/uL   Comp Metabolic Panel   Result Value Ref Range    Sodium 138 135 - 145 mmol/L    Potassium 4.2 3.6 - 5.5 mmol/L    Chloride 103 96 - 112 mmol/L    Co2 24 20 - 33 mmol/L    Anion Gap 11.0 7.0 - 16.0    Glucose 217 (H) 65 - 99 mg/dL    Bun 17 8 - 22 mg/dL    Creatinine 0.70 0.50 - 1.40 mg/dL    Calcium 9.3 8.5 - 10.5 mg/dL    AST(SGOT) 17 12 - 45 U/L    ALT(SGPT) 16 2 - 50 U/L    Alkaline Phosphatase 77 30 - 99 U/L    Total Bilirubin 0.3 0.1 - 1.5 mg/dL    Albumin 3.9 3.2 - 4.9 g/dL    Total Protein 7.9 6.0 - 8.2 g/dL    Globulin 4.0 (H) 1.9 - 3.5 g/dL    A-G Ratio 1.0 g/dL   TROPONIN   Result Value Ref Range    Troponin T 18 6 - 19 ng/L   ESTIMATED GFR   Result Value Ref Range    GFR If African American >60 >60 mL/min/1.73 m 2    GFR If Non African American >60 >60 mL/min/1.73 m 2   EKG   Result Value Ref Range    Report       [facility].    Test Date:  2020  Pt Name:    TRELL CASTLE              Department: ER  MRN:        [id number]                      Room:       [facility]  Gender:     Male                         Technician: [id number]  :        1965                   Requested By:ER TRIAGE PROTOCOL  Order #:    [id number]                    Reading MD: QUINCY GOLDEN MD    Measurements  Intervals                                Axis  Rate:       76                           P:          63  NY:         144                          QRS:        10  QRSD:       94                           T:          65  QT:         392  QTc:        441    Interpretive Statements  SINUS RHYTHM  RSR' IN V1 OR V2, PROBABLY NORMAL VARIANT  BORDERLINE T ABNORMALITIES, ANT-LAT LEADS  Compared to ECG 2019 03:15:52  RSR' in V1 or V2 now present  Possible ischemia no longer present  Prolonged  QT interval no longer present  T-wave abnormality still present  Electronically Signed On 3-1 8-2020 11:23:54 PDT by HEBER LANGLEY MD        EKG interpretation by me rate 76 sinus rhythm no acute ST segment elevation or depression.  No ectopy.  Intervals and axis are normal.  No suggestion of arrhythmia or ischemia    An IV was established.  The patient was clinically dehydrated and nauseous therefore he was given IV fluids.  His work appears reassuring.  He has no suggestion of ectopy, dysrhythmia tacky dysrhythmia.  His laboratory studies including CBC troponin metabolic panel and electrolytes are normal.  He was observed for around 2 hours on telemetry and did not have any suggestion of dysrhythmia.  He reports history of likely vasovagal syncope when seeing his own blood or wound.  I did not feel that admission or additional testing is indicated.  Return precautions have been reviewed.  After IV fluids the patient felt subjectively better and made urine.  His blood sugar is slightly elevated but has no evidence of acidosis.    FINAL IMPRESSION  1.  Vasovagal syncope  2.  Hyperglycemia  3.  Mild dehydration         Electronically signed by: Heber Langley M.D., 3/18/2020 11:20 AM

## 2020-03-19 VITALS
SYSTOLIC BLOOD PRESSURE: 128 MMHG | DIASTOLIC BLOOD PRESSURE: 67 MMHG | RESPIRATION RATE: 16 BRPM | TEMPERATURE: 97.9 F | OXYGEN SATURATION: 99 % | HEART RATE: 85 BPM

## 2020-03-19 ASSESSMENT — ENCOUNTER SYMPTOMS
LIMITED RANGE OF MOTION: 1
MUSCLE WEAKNESS: 1

## 2020-03-23 ENCOUNTER — HOME CARE VISIT (OUTPATIENT)
Dept: HOME HEALTH SERVICES | Facility: HOME HEALTHCARE | Age: 55
End: 2020-03-23
Payer: COMMERCIAL

## 2020-03-23 PROCEDURE — G0299 HHS/HOSPICE OF RN EA 15 MIN: HCPCS

## 2020-03-25 VITALS
DIASTOLIC BLOOD PRESSURE: 60 MMHG | RESPIRATION RATE: 17 BRPM | HEART RATE: 97 BPM | SYSTOLIC BLOOD PRESSURE: 122 MMHG | TEMPERATURE: 97.6 F | OXYGEN SATURATION: 99 %

## 2020-03-25 ASSESSMENT — ENCOUNTER SYMPTOMS
NAUSEA: DENIES ANY
MENTAL STATUS CHANGE: 0

## 2020-03-25 ASSESSMENT — ACTIVITIES OF DAILY LIVING (ADL): TRANSPORTATION COMMENTS: L BKA

## 2020-03-30 ENCOUNTER — HOME CARE VISIT (OUTPATIENT)
Dept: HOME HEALTH SERVICES | Facility: HOME HEALTHCARE | Age: 55
End: 2020-03-30
Payer: COMMERCIAL

## 2020-03-30 PROCEDURE — G0299 HHS/HOSPICE OF RN EA 15 MIN: HCPCS

## 2020-04-01 VITALS
DIASTOLIC BLOOD PRESSURE: 61 MMHG | OXYGEN SATURATION: 99 % | SYSTOLIC BLOOD PRESSURE: 118 MMHG | TEMPERATURE: 97.5 F | RESPIRATION RATE: 17 BRPM | HEART RATE: 83 BPM

## 2020-04-01 ASSESSMENT — ACTIVITIES OF DAILY LIVING (ADL): TRANSPORTATION COMMENTS: BKA

## 2020-04-01 ASSESSMENT — ENCOUNTER SYMPTOMS: NAUSEA: DENIES ANY

## 2020-04-06 ENCOUNTER — HOME CARE VISIT (OUTPATIENT)
Dept: HOME HEALTH SERVICES | Facility: HOME HEALTHCARE | Age: 55
End: 2020-04-06
Payer: COMMERCIAL

## 2020-04-13 ENCOUNTER — HOME CARE VISIT (OUTPATIENT)
Dept: HOME HEALTH SERVICES | Facility: HOME HEALTHCARE | Age: 55
End: 2020-04-13
Payer: COMMERCIAL

## 2020-04-13 VITALS
DIASTOLIC BLOOD PRESSURE: 60 MMHG | HEART RATE: 61 BPM | TEMPERATURE: 97.2 F | SYSTOLIC BLOOD PRESSURE: 111 MMHG | RESPIRATION RATE: 17 BRPM | OXYGEN SATURATION: 99 %

## 2020-04-13 PROCEDURE — G0299 HHS/HOSPICE OF RN EA 15 MIN: HCPCS

## 2020-04-13 PROCEDURE — 665003 FOLLOW UP-HOME HEALTH

## 2020-04-13 ASSESSMENT — ENCOUNTER SYMPTOMS: MUSCLE WEAKNESS: 1

## 2020-04-20 ENCOUNTER — HOME CARE VISIT (OUTPATIENT)
Dept: HOME HEALTH SERVICES | Facility: HOME HEALTHCARE | Age: 55
End: 2020-04-20
Payer: COMMERCIAL

## 2020-04-27 ENCOUNTER — HOME CARE VISIT (OUTPATIENT)
Dept: HOME HEALTH SERVICES | Facility: HOME HEALTHCARE | Age: 55
End: 2020-04-27
Payer: COMMERCIAL

## 2020-04-27 PROCEDURE — G0299 HHS/HOSPICE OF RN EA 15 MIN: HCPCS

## 2020-04-28 VITALS
TEMPERATURE: 97.2 F | SYSTOLIC BLOOD PRESSURE: 122 MMHG | DIASTOLIC BLOOD PRESSURE: 68 MMHG | RESPIRATION RATE: 16 BRPM | OXYGEN SATURATION: 99 % | HEART RATE: 90 BPM

## 2020-05-04 ENCOUNTER — HOME CARE VISIT (OUTPATIENT)
Dept: HOME HEALTH SERVICES | Facility: HOME HEALTHCARE | Age: 55
End: 2020-05-04
Payer: COMMERCIAL

## 2020-05-11 ENCOUNTER — HOME CARE VISIT (OUTPATIENT)
Dept: HOME HEALTH SERVICES | Facility: HOME HEALTHCARE | Age: 55
End: 2020-05-11
Payer: COMMERCIAL

## 2020-05-11 VITALS
OXYGEN SATURATION: 99 % | HEART RATE: 86 BPM | TEMPERATURE: 97.6 F | SYSTOLIC BLOOD PRESSURE: 118 MMHG | RESPIRATION RATE: 17 BRPM | DIASTOLIC BLOOD PRESSURE: 60 MMHG

## 2020-05-11 PROCEDURE — G0493 RN CARE EA 15 MIN HH/HOSPICE: HCPCS

## 2020-05-11 ASSESSMENT — ACTIVITIES OF DAILY LIVING (ADL): TRANSPORTATION COMMENTS: L BKA

## 2020-05-12 ENCOUNTER — ANTICOAGULATION MONITORING (OUTPATIENT)
Dept: VASCULAR LAB | Facility: MEDICAL CENTER | Age: 55
End: 2020-05-12

## 2020-05-12 ASSESSMENT — PATIENT HEALTH QUESTIONNAIRE - PHQ9: CLINICAL INTERPRETATION OF PHQ2 SCORE: 0

## 2020-05-12 ASSESSMENT — ACTIVITIES OF DAILY LIVING (ADL): OASIS_M1830: 01

## 2020-05-12 NOTE — PROGRESS NOTES
Received referral from [facility]. Medications reviewed. No clinically significant interactions noted.

## 2020-05-13 ENCOUNTER — OFFICE VISIT (OUTPATIENT)
Dept: CARDIOLOGY | Facility: MEDICAL CENTER | Age: 55
End: 2020-05-13
Payer: COMMERCIAL

## 2020-05-13 ENCOUNTER — TELEPHONE (OUTPATIENT)
Dept: CARDIOLOGY | Facility: MEDICAL CENTER | Age: 55
End: 2020-05-13

## 2020-05-13 VITALS
DIASTOLIC BLOOD PRESSURE: 72 MMHG | BODY MASS INDEX: 22.62 KG/M2 | SYSTOLIC BLOOD PRESSURE: 90 MMHG | HEIGHT: 72 IN | OXYGEN SATURATION: 97 % | RESPIRATION RATE: 14 BRPM | WEIGHT: 167 LBS | HEART RATE: 92 BPM

## 2020-05-13 DIAGNOSIS — I50.20 HFREF (HEART FAILURE WITH REDUCED EJECTION FRACTION) (HCC): ICD-10-CM

## 2020-05-13 DIAGNOSIS — E78.2 MIXED HYPERLIPIDEMIA: ICD-10-CM

## 2020-05-13 DIAGNOSIS — I25.10 CORONARY ARTERY DISEASE INVOLVING NATIVE CORONARY ARTERY OF NATIVE HEART, ANGINA PRESENCE UNSPECIFIED: ICD-10-CM

## 2020-05-13 DIAGNOSIS — I25.5 ISCHEMIC CARDIOMYOPATHY: ICD-10-CM

## 2020-05-13 DIAGNOSIS — E11.65 UNCONTROLLED TYPE 2 DIABETES MELLITUS WITH HYPERGLYCEMIA (HCC): ICD-10-CM

## 2020-05-13 DIAGNOSIS — I73.9 PVD (PERIPHERAL VASCULAR DISEASE) (HCC): ICD-10-CM

## 2020-05-13 PROBLEM — I10 HTN (HYPERTENSION): Status: RESOLVED | Noted: 2019-12-28 | Resolved: 2020-05-13

## 2020-05-13 PROCEDURE — 99215 OFFICE O/P EST HI 40 MIN: CPT | Performed by: INTERNAL MEDICINE

## 2020-05-13 PROCEDURE — 665003 FOLLOW UP-HOME HEALTH

## 2020-05-13 RX ORDER — COLLAGENASE SANTYL 250 [ARB'U]/G
OINTMENT TOPICAL
COMMUNITY
Start: 2020-03-31 | End: 2020-05-13

## 2020-05-13 RX ORDER — METOPROLOL SUCCINATE 25 MG/1
25 TABLET, EXTENDED RELEASE ORAL DAILY
Qty: 90 TAB | Refills: 3 | Status: SHIPPED | OUTPATIENT
Start: 2020-05-13 | End: 2020-05-28 | Stop reason: SINTOL

## 2020-05-13 RX ORDER — MINOCYCLINE HYDROCHLORIDE 100 MG/1
CAPSULE ORAL
COMMUNITY
Start: 2020-04-15 | End: 2020-05-13

## 2020-05-13 RX ORDER — AMOXICILLIN AND CLAVULANATE POTASSIUM 875; 125 MG/1; MG/1
1 TABLET, FILM COATED ORAL
COMMUNITY
Start: 2020-03-18 | End: 2020-05-13

## 2020-05-13 RX ORDER — COLLAGENASE SANTYL 250 [ARB'U]/G
OINTMENT TOPICAL
COMMUNITY
Start: 2020-04-06 | End: 2020-05-13

## 2020-05-13 ASSESSMENT — FIBROSIS 4 INDEX: FIB4 SCORE: 1.51

## 2020-05-13 ASSESSMENT — ENCOUNTER SYMPTOMS
FLANK PAIN: 0
VOMITING: 0
DECREASED APPETITE: 0
HEARTBURN: 0
SHORTNESS OF BREATH: 0
CLAUDICATION: 0
COUGH: 0
WEIGHT GAIN: 0
IRREGULAR HEARTBEAT: 0
PALPITATIONS: 0
NAUSEA: 0
ABDOMINAL PAIN: 0
DIARRHEA: 0
PND: 0
CONSTIPATION: 0
FEVER: 0
ORTHOPNEA: 0
NEAR-SYNCOPE: 0
BACK PAIN: 0
DIZZINESS: 0
DYSPNEA ON EXERTION: 0
DEPRESSION: 0
ALTERED MENTAL STATUS: 0
SYNCOPE: 0
WEIGHT LOSS: 0
BLURRED VISION: 0

## 2020-05-13 NOTE — PROGRESS NOTES
Cardiology Note    Heart failure    History of Present Illness: Israel Aviles is a 54 year old man PMH DM2, HTN, PAD s/p angioplasty c/b osteomyelitis s/p LLE BKA 12/20/19; incidentally found new HFrEF 30-35% improved to 45% on repeat and obstructive multivessel CAD presents for follow up.    Currently feels well. Denies active cardiac complaints. He is compliant with medications. His blood pressure is usually low but denies symptoms. He has blood pressure cuff at home.    Review of Systems   Constitution: Negative for decreased appetite, fever, malaise/fatigue, weight gain and weight loss.   HENT: Negative for congestion and nosebleeds.    Eyes: Negative for blurred vision.   Cardiovascular: Negative for chest pain, claudication, dyspnea on exertion, irregular heartbeat, leg swelling, near-syncope, orthopnea, palpitations, paroxysmal nocturnal dyspnea and syncope.   Respiratory: Negative for cough and shortness of breath.    Endocrine: Negative for cold intolerance and heat intolerance.   Skin: Negative for rash.   Musculoskeletal: Negative for back pain.   Gastrointestinal: Negative for abdominal pain, constipation, diarrhea, heartburn, melena, nausea and vomiting.   Genitourinary: Negative for dysuria, flank pain and hematuria.   Neurological: Negative for dizziness.   Psychiatric/Behavioral: Negative for altered mental status and depression.         Past Medical History:   Diagnosis Date   • CAD (coronary artery disease)    • Diabetes (HCC)    • Hyperlipidemia    • Hypertension          Past Surgical History:   Procedure Laterality Date   • KNEE AMPUTATION BELOW Left 12/20/2019    Procedure: AMPUTATION, BELOW KNEE;  Surgeon: Koby Zhao M.D.;  Location: SURGERY [facility];  Service: Orthopedics   • ZZZ CARDIAC CATH  12/16/2019    multi-vessel disease   • IRRIGATION & DEBRIDEMENT ORTHO Left 6/24/2019    Procedure: IRRIGATION AND DEBRIDEMENT, WOUND-FOOT, BIOLOGIC PLACEMENT, WOUND VAC PLACEMENT;   Surgeon: Charles Chopra M.D.;  Location: SURGERY [facility];  Service: Orthopedics         Current Outpatient Medications   Medication Sig Dispense Refill   • metoprolol SR (TOPROL XL) 25 MG TABLET SR 24 HR Take 1 Tab by mouth every day. 90 Tab 3   • glipiZIDE (GLUCOTROL) 5 MG Tab Take 5 mg by mouth 2 Times a Day.     • clopidogrel (PLAVIX) 75 MG Tab Take 1 Tab by mouth every bedtime. 30 Tab 11   • aspirin 81 MG EC tablet Take 1 Tab by mouth every day. 30 Tab 3   • ferrous sulfate 325 (65 Fe) MG tablet Take 1 Tab by mouth every morning with breakfast. 30 Tab 1   • atorvastatin (LIPITOR) 80 MG tablet Take 1 Tab by mouth every day. 90 Tab 2   • metformin (GLUCOPHAGE) 1000 MG tablet Take 1 Tab by mouth 2 times a day, with meals. 60 Tab 2   • ascorbic acid (VITAMIN C) 500 MG tablet Take 1 Tab by mouth 2 Times a Day. 60 Tab 1   • amoxicillin-clavulanate (AUGMENTIN) 875-125 MG Tab Take 1 Tab by mouth.     • collagenase (SANTYL) ointment SANTYL 250 UNIT/GM OINT     • SANTYL ointment APPLY EVERY DAY FOR ULCER DEBRIDEMENT     • minocycline (MINOCIN) 100 MG Cap TAKE 1 CAPSULE BY MOUTH ONCE DAILY WITH FOOD       No current facility-administered medications for this visit.          No Known Allergies      Family History   Problem Relation Age of Onset   • Diabetes Mother    • Diabetes Father          Social History     Socioeconomic History   • Marital status:      Spouse name: Not on file   • Number of children: Not on file   • Years of education: Not on file   • Highest education level: Not on file   Occupational History   • Not on file   Social Needs   • Financial resource strain: Not on file   • Food insecurity     Worry: Not on file     Inability: Not on file   • Transportation needs     Medical: Not on file     Non-medical: Not on file   Tobacco Use   • Smoking status: Never Smoker   • Smokeless tobacco: Never Used   Substance and Sexual Activity   • Alcohol use: No   • Drug use: No   • Sexual activity: Not  on file   Lifestyle   • Physical activity     Days per week: Not on file     Minutes per session: Not on file   • Stress: Not on file   Relationships   • Social connections     Talks on phone: Not on file     Gets together: Not on file     Attends Muslim service: Not on file     Active member of club or organization: Not on file     Attends meetings of clubs or organizations: Not on file     Relationship status: Not on file   • Intimate partner violence     Fear of current or ex partner: Not on file     Emotionally abused: Not on file     Physically abused: Not on file     Forced sexual activity: Not on file   Other Topics Concern   • Not on file   Social History Narrative   • Not on file         Physical Exam:  Ambulatory Vitals  BP (!) 90/72 (BP Location: Right arm, Patient Position: Sitting, BP Cuff Size: Adult)   Pulse 92   Resp 14   Ht 1.829 m (6')   Wt 75.8 kg (167 lb)   SpO2 97%    BP Readings from Last 4 Encounters:   05/13/20 (!) 90/72   05/11/20 118/60   04/27/20 122/68   04/13/20 111/60     Weight/BMI:   Vitals:    05/13/20 0904   BP: (!) 90/72   Weight: 75.8 kg (167 lb)   Height: 1.829 m (6')    Body mass index is 22.65 kg/m².  Wt Readings from Last 4 Encounters:   05/13/20 75.8 kg (167 lb)   03/18/20 72.6 kg (160 lb)   01/28/20 69.9 kg (154 lb)   01/14/20 70.3 kg (155 lb)       Physical Exam   Constitutional: He is oriented to person, place, and time and well-developed, well-nourished, and in no distress. No distress.   HENT:   Head: Normocephalic and atraumatic.   Eyes: Pupils are equal, round, and reactive to light. Conjunctivae are normal.   Neck: Normal range of motion. Neck supple. No JVD present.   Cardiovascular: Normal rate, regular rhythm, normal heart sounds and intact distal pulses. Exam reveals no gallop and no friction rub.   No murmur heard.  Pulmonary/Chest: Effort normal and breath sounds normal. No respiratory distress. He has no wheezes. He has no rales. He exhibits no  tenderness.   Abdominal: Soft. Bowel sounds are normal. He exhibits no distension.   Musculoskeletal:         General: No edema.   Neurological: He is alert and oriented to person, place, and time.   Skin: Skin is warm and dry.   Psychiatric: Affect and judgment normal.       Lab Data Review:  Lab Results   Component Value Date/Time    CHOLSTRLTOT 262 (H) 06/15/2018 10:20 PM     (H) 06/15/2018 10:20 PM    HDL 54 06/15/2018 10:20 PM    TRIGLYCERIDE 132 06/15/2018 10:20 PM       Lab Results   Component Value Date/Time    SODIUM 138 03/18/2020 10:50 AM    POTASSIUM 4.2 03/18/2020 10:50 AM    CHLORIDE 103 03/18/2020 10:50 AM    CO2 24 03/18/2020 10:50 AM    GLUCOSE 217 (H) 03/18/2020 10:50 AM    BUN 17 03/18/2020 10:50 AM    CREATININE 0.70 03/18/2020 10:50 AM    CREATININE 0.9 05/09/2007 01:20 AM     CrCl cannot be calculated (Patient's most recent lab result is older than the maximum 7 days allowed.).  Lab Results   Component Value Date/Time    ALKPHOSPHAT 77 03/18/2020 10:50 AM    ASTSGOT 17 03/18/2020 10:50 AM    ALTSGPT 16 03/18/2020 10:50 AM    TBILIRUBIN 0.3 03/18/2020 10:50 AM      Lab Results   Component Value Date/Time    WBC 4.5 (L) 03/18/2020 10:50 AM     Lab Results   Component Value Date/Time    HBA1C 8.5 (H) 12/17/2019 12:33 AM     No components found for: TROP      Cardiac Imaging and Procedures Review:      TTE 12/18/2019  CONCLUSIONS  Limited Exam for LV Apical Thrombus.   No thrombus. Mildly reduced left ventricular systolic function.  Left ventricular ejection fraction is visually estimated to be 45%.  There is apical septum and apical akinesis.    TTE 12/16/19  CONCLUSIONS  Severely reduced left ventricular systolic function. Left ventricular   ejection fraction is visually estimated to be 30-35%.   There appears to be a linear echodensity which is adjacent to the LV   apex concerning for possible small LV thrombus. May consider repeat   limted echo in 1-2 days for re-evaluation if clinically  indicated.    Normal inferior vena cava size and inspiratory collapse.  Indeterminate diastolic function.  Aortic sclerosis without stenosis.     [facility]  Coronary Diagnostic Findings    * Left main: Distal tapering with 50% stenosis.    * Left anterior descending: Diffuse atherosclerosis and negative remodeling  beginning at the origin.  There are sequential 70% stenosis in the proximal  and mid segment of the vessel.  There is subtotal occlusion in the mid to  distal segment with AGATHA II flow into the apical LAD. The first diagonal has  proximal 70% stenosis and is diffusely diseased and negatively remodeled with  95% stenosis in the lower branch.    * Left circumflex: Mid vessel 70-80% stenosis.  The OM1 has diffuse  atherosclerosis there is less than 1.5 mm in size and without significant  narrowing.  The OM 2 bifurcates proximally the upper branch has proximal 50%  stenosis and is a less than 1.5 mm vessel.  The lower branch is a 2.25 mm  vessel and has proximal 85% stenosis.    * Right coronary artery: Dominant, diffuse 60% stenosis in the proximal mid  and distal AV groove.  There is a dual PDA.  The first PDA has a mid 80%  stenosis and diffuse disease downstream.  The second PDA has proximal 80%  stenosis.  Conclusions    1. Severe LV systolic dysfunction by noninvasive evaluation.    2. Severe and diffuse obstructive three-vessel coronary artery disease.    3. Normal LVEDP.    Medical Decision Making:  Problem List Items Addressed This Visit     Uncontrolled type 2 diabetes mellitus with hyperglycemia ([facility])    Relevant Orders    REFERRAL TO ENDOCRINOLOGY    Hyperlipidemia    Relevant Medications    metoprolol SR (TOPROL XL) 25 MG TABLET SR 24 HR    CAD (coronary artery disease)    Relevant Medications    metoprolol SR (TOPROL XL) 25 MG TABLET SR 24 HR    Other Relevant Orders    MR-CARDIAC MORPH/FUNC WITH & W/O    HFrEF (heart failure with reduced ejection fraction) ([facility])    Relevant Medications    metoprolol  SR (TOPROL XL) 25 MG TABLET SR 24 HR    Other Relevant Orders    MR-CARDIAC MORPH/FUNC WITH & W/O    Ischemic cardiomyopathy    Relevant Medications    metoprolol SR (TOPROL XL) 25 MG TABLET SR 24 HR    PVD (peripheral vascular disease) (HCC)    Relevant Medications    metoprolol SR (TOPROL XL) 25 MG TABLET SR 24 HR        ICM, HFrEF 45%, NYHA I, stage C - convert carvedilol to metoprolol succinate. Continue check BP a home in attempt to start ACE/ARB next visit. Check cardiac MRI for viability. Consider revascularization. No clear indication for MRA given level of function. No need for diuretic as not symptomatic.    Severe CAD/PAD/HLD - continue dapt and statin. Will repeat lipids next visit. Goal LDL <70. Follow up with vascular surgery.    Dm2 - follow up with endocrine and podiatry.    It was my pleasure to meet with Mr. Aviles.

## 2020-05-13 NOTE — TELEPHONE ENCOUNTER
Labs before MR Cardiac Wil   Received: Today   LUCIANO Peguero,     The  said the pt needs a new CMP lab done before his test on . The test that is in his chart will .     If you can put the order in, I will send the lab order to him.      CMP order placed per protocol. M with pt at [phone] notifying that labs will be needed prior to 20 MRI. Lab slip mailed and informed him to complete within the 30 days prior to MRI.

## 2020-05-13 NOTE — PATIENT INSTRUCTIONS
Check blood pressure at home morning and nigh for two weeks.  Check cardiac MRI for viability.  Follow up in two weeks for blood pressure check and heart failure medical optimization.    Heart Failure  Heart failure is a condition in which the heart has trouble pumping blood because it has become weak or stiff. This means that the heart does not pump blood efficiently for the body to work well. For some people with heart failure, fluid may back up into the lungs and there may be swelling (edema) in the lower legs. Heart failure is usually a long-term (chronic) condition. It is important for you to take good care of yourself and follow the treatment plan from your health care provider.  What are the causes?  This condition is caused by some health problems, including:  · High blood pressure (hypertension). Hypertension causes the heart muscle to work harder than normal. High blood pressure eventually causes the heart to become stiff and weak.  · Coronary artery disease (CAD). CAD is the buildup of cholesterol and fat (plaques) in the arteries of the heart.  · Heart attack (myocardial infarction). Injured tissue, which is caused by the heart attack, does not contract as well and the heart's ability to pump blood is weakened.  · Abnormal heart valves. When the heart valves do not open and close properly, the heart muscle must pump harder to keep the blood flowing.  · Heart muscle disease (cardiomyopathy or myocarditis). Heart muscle disease is damage to the heart muscle from a variety of causes, such as drug or alcohol abuse, infections, or unknown causes. These can increase the risk of heart failure.  · Lung disease. When the lungs do not work properly, the heart must work harder.  What increases the risk?  Risk of heart failure increases as a person ages. This condition is also more likely to develop in people who:  · Are overweight.  · Are male.  · Smoke or chew tobacco.  · Abuse alcohol or illegal drugs.  · Have  taken medicines that can damage the heart, such as chemotherapy drugs.  · Have diabetes.  ¨ High blood sugar (glucose) is associated with high fat (lipid) levels in the blood.  ¨ Diabetes can also damage tiny blood vessels that carry nutrients to the heart muscle.  · Have abnormal heart rhythms.  · Have thyroid problems.  · Have low blood counts (anemia).  What are the signs or symptoms?  Symptoms of this condition include:  · Shortness of breath with activity, such as when climbing stairs.  · Persistent cough.  · Swelling of the feet, ankles, legs, or abdomen.  · Unexplained weight gain.  · Difficulty breathing when lying flat (orthopnea).  · Waking from sleep because of the need to sit up and get more air.  · Rapid heartbeat.  · Fatigue and loss of energy.  · Feeling light-headed, dizzy, or close to fainting.  · Loss of appetite.  · Nausea.  · Increased urination during the night (nocturia).  · Confusion.  How is this diagnosed?  This condition is diagnosed based on:  · Medical history, symptoms, and a physical exam.  · Diagnostic tests, which may include:  ¨ Echocardiogram.  ¨ Electrocardiogram (ECG).  ¨ Chest X-ray.  ¨ Blood tests.  ¨ Exercise stress test.  ¨ Radionuclide scans.  ¨ Cardiac catheterization and angiogram.  How is this treated?  Treatment for this condition is aimed at managing the symptoms of heart failure. Medicines, behavioral changes, or other treatments may be necessary to treat heart failure.  Medicines   These may include:  · Angiotensin-converting enzyme (ACE) inhibitors. This type of medicine blocks the effects of a blood protein called angiotensin-converting enzyme. ACE inhibitors relax (dilate) the blood vessels and help to lower blood pressure.  · Angiotensin receptor blockers (ARBs). This type of medicine blocks the actions of a blood protein called angiotensin. ARBs dilate the blood vessels and help to lower blood pressure.  · Water pills (diuretics). Diuretics cause the kidneys to  remove salt and water from the blood. The extra fluid is removed through urination, leaving a lower volume of blood that the heart has to pump.  · Beta blockers. These improve heart muscle strength and they prevent the heart from beating too quickly.  · Digoxin. This increases the force of the heartbeat.  Healthy behavior changes   These may include:  · Reaching and maintaining a healthy weight.  · Stopping smoking or chewing tobacco.  · Eating heart-healthy foods.  · Limiting or avoiding alcohol.  · Stopping use of street drugs (illegal drugs).  · Physical activity.  Other treatments   These may include:  · Surgery to open blocked coronary arteries or repair damaged heart valves.  · Placement of a biventricular pacemaker to improve heart muscle function (cardiac resynchronization therapy). This device paces both the right ventricle and left ventricle.  · Placement of a device to treat serious abnormal heart rhythms (implantable cardioverter defibrillator, or ICD).  · Placement of a device to improve the pumping ability of the heart (left ventricular assist device, or LVAD).  · Heart transplant. This can cure heart failure, and it is considered for certain patients who do not improve with other therapies.  Follow these instructions at home:  Medicines  · Take over-the-counter and prescription medicines only as told by your health care provider. Medicines are important in reducing the workload of your heart, slowing the progression of heart failure, and improving your symptoms.  ¨ Do not stop taking your medicine unless your health care provider told you to do that.  ¨ Do not skip any dose of medicine.  ¨ Refill your prescriptions before you run out of medicine. You need your medicines every day.  Eating and drinking  · Eat heart-healthy foods. Talk with a dietitian to make an eating plan that is right for you.  ¨ Choose foods that contain no trans fat and are low in saturated fat and cholesterol. Healthy choices  include fresh or frozen fruits and vegetables, fish, lean meats, legumes, fat-free or low-fat dairy products, and whole-grain or high-fiber foods.  ¨ Limit salt (sodium) if directed by your health care provider. Sodium restriction may reduce symptoms of heart failure. Ask a dietitian to recommend heart-healthy seasonings.  ¨ Use healthy cooking methods instead of frying. Healthy methods include roasting, grilling, broiling, baking, poaching, steaming, and stir-frying.  · Limit your fluid intake if directed by your health care provider. Fluid restriction may reduce symptoms of heart failure.  Lifestyle  · Stop smoking or using chewing tobacco. Nicotine and tobacco can damage your heart and your blood vessels. Do not use nicotine gum or patches before talking to your health care provider.  · Limit alcohol intake to no more than 1 drink per day for non-pregnant women and 2 drinks per day for men. One drink equals 12 oz of beer, 5 oz of wine, or 1½ oz of hard liquor.  ¨ Drinking more than that is harmful to your heart. Tell your health care provider if you drink alcohol several times a week.  ¨ Talk with your health care provider about whether any level of alcohol use is safe for you.  ¨ If your heart has already been damaged by alcohol or you have severe heart failure, drinking alcohol should be stopped completely.  · Stop use of illegal drugs.  · Lose weight if directed by your health care provider. Weight loss may reduce symptoms of heart failure.  · Do moderate physical activity if directed by your health care provider. People who are elderly and people with severe heart failure should consult with a health care provider for physical activity recommendations.  Monitor important information  · Weigh yourself every day. Keeping track of your weight daily helps you to notice excess fluid sooner.  ¨ Weigh yourself every morning after you urinate and before you eat breakfast.  ¨ Wear the same amount of clothing each time  you weigh yourself.  ¨ Record your daily weight. Provide your health care provider with your weight record.  · Monitor and record your blood pressure as told by your health care provider.  · Check your pulse as told by your health care provider.  Dealing with extreme temperatures  · If the weather is extremely hot:  ¨ Avoid vigorous physical activity.  ¨ Use air conditioning or fans or seek a cooler location.  ¨ Avoid caffeine and alcohol.  ¨ Wear loose-fitting, lightweight, and light-colored clothing.  · If the weather is extremely cold:  ¨ Avoid vigorous physical activity.  ¨ Layer your clothes.  ¨ Wear mittens or gloves, a hat, and a scarf when you go outside.  ¨ Avoid alcohol.  General instructions  · Manage other health conditions such as hypertension, diabetes, thyroid disease, or abnormal heart rhythms as told by your health care provider.  · Learn to manage stress. If you need help to do this, ask your health care provider.  · Plan rest periods when fatigued.  · Get ongoing education and support as needed.  · Participate in or seek rehabilitation as needed to maintain or improve independence and quality of life.  · Stay up to date with immunizations. Keeping current on pneumococcal and influenza immunizations is especially important to prevent respiratory infections.  · Keep all follow-up visits as told by your health care provider. This is important.  Contact a health care provider if:  · You have a rapid weight gain.  · You have increasing shortness of breath that is unusual for you.  · You are unable to participate in your usual physical activities.  · You tire easily.  · You cough more than normal, especially with physical activity.  · You have any swelling or more swelling in areas such as your hands, feet, ankles, or abdomen.  · You are unable to sleep because it is hard to breathe.  · You feel like your heart is beating quickly (palpitations).  · You become dizzy or light-headed when you stand  up.  Get help right away if:  · You have difficulty breathing.  · You notice or your family notices a change in your awareness, such as having trouble staying awake or having difficulty with concentration.  · You have pain or discomfort in your chest.  · You have an episode of fainting (syncope).  This information is not intended to replace advice given to you by your health care provider. Make sure you discuss any questions you have with your health care provider.  Document Released: 12/18/2006 Document Revised: 08/22/2017 Document Reviewed: 07/12/2017  [employer] Interactive Patient Education © 2017 [employer]

## 2020-05-15 ENCOUNTER — HOME CARE VISIT (OUTPATIENT)
Dept: HOME HEALTH SERVICES | Facility: HOME HEALTHCARE | Age: 55
End: 2020-05-15
Payer: COMMERCIAL

## 2020-05-25 ENCOUNTER — HOME CARE VISIT (OUTPATIENT)
Dept: HOME HEALTH SERVICES | Facility: HOME HEALTHCARE | Age: 55
End: 2020-05-25
Payer: COMMERCIAL

## 2020-05-25 PROCEDURE — G0299 HHS/HOSPICE OF RN EA 15 MIN: HCPCS

## 2020-05-25 PROCEDURE — 665003 FOLLOW UP-HOME HEALTH

## 2020-05-28 ENCOUNTER — TELEMEDICINE (OUTPATIENT)
Dept: CARDIOLOGY | Facility: MEDICAL CENTER | Age: 55
End: 2020-05-28
Payer: COMMERCIAL

## 2020-05-28 VITALS
SYSTOLIC BLOOD PRESSURE: 106 MMHG | HEART RATE: 91 BPM | BODY MASS INDEX: 23.84 KG/M2 | HEIGHT: 72 IN | WEIGHT: 176 LBS | DIASTOLIC BLOOD PRESSURE: 56 MMHG

## 2020-05-28 DIAGNOSIS — I73.9 PVD (PERIPHERAL VASCULAR DISEASE) (HCC): ICD-10-CM

## 2020-05-28 DIAGNOSIS — I50.20 ACC/AHA STAGE C SYSTOLIC HEART FAILURE (HCC): ICD-10-CM

## 2020-05-28 DIAGNOSIS — I25.10 CORONARY ARTERY DISEASE INVOLVING NATIVE CORONARY ARTERY OF NATIVE HEART, ANGINA PRESENCE UNSPECIFIED: ICD-10-CM

## 2020-05-28 DIAGNOSIS — E11.65 UNCONTROLLED TYPE 2 DIABETES MELLITUS WITH HYPERGLYCEMIA (HCC): ICD-10-CM

## 2020-05-28 DIAGNOSIS — I50.9 HEART FAILURE, NYHA CLASS 1 (HCC): ICD-10-CM

## 2020-05-28 DIAGNOSIS — I25.5 ISCHEMIC CARDIOMYOPATHY: ICD-10-CM

## 2020-05-28 DIAGNOSIS — E78.2 MIXED HYPERLIPIDEMIA: ICD-10-CM

## 2020-05-28 PROCEDURE — 99214 OFFICE O/P EST MOD 30 MIN: CPT | Mod: 95,CR | Performed by: NURSE PRACTITIONER

## 2020-05-28 RX ORDER — ATORVASTATIN CALCIUM 80 MG/1
80 TABLET, FILM COATED ORAL DAILY
Qty: 90 TAB | Refills: 3 | Status: SHIPPED | OUTPATIENT
Start: 2020-05-28 | End: 2022-03-16 | Stop reason: SDUPTHER

## 2020-05-28 RX ORDER — LISINOPRIL 2.5 MG/1
2.5 TABLET ORAL DAILY
Qty: 90 TAB | Refills: 3 | Status: SHIPPED | OUTPATIENT
Start: 2020-05-28 | End: 2020-08-27

## 2020-05-28 RX ORDER — EZETIMIBE 10 MG/1
10 TABLET ORAL DAILY
Qty: 90 TAB | Refills: 3 | Status: SHIPPED | OUTPATIENT
Start: 2020-05-28 | End: 2020-12-08 | Stop reason: SDUPTHER

## 2020-05-28 RX ORDER — CARVEDILOL 3.12 MG/1
3.12 TABLET ORAL 2 TIMES DAILY WITH MEALS
COMMUNITY
End: 2020-05-28 | Stop reason: CLARIF

## 2020-05-28 ASSESSMENT — ENCOUNTER SYMPTOMS
SHORTNESS OF BREATH: 0
MYALGIAS: 0
ORTHOPNEA: 0
ABDOMINAL PAIN: 0
DIZZINESS: 0
HEADACHES: 1
PND: 0
CLAUDICATION: 0
PALPITATIONS: 0
FEVER: 0
COUGH: 0

## 2020-05-28 ASSESSMENT — FIBROSIS 4 INDEX: FIB4 SCORE: 1.51

## 2020-05-28 NOTE — PROGRESS NOTES
Chief Complaint   Patient presents with   • Coronary Artery Disease       Subjective:   Israel Aviles is a 54 y.o. male who presents today for follow-up on his cardiomyopathy.    He is a patient of Dr. Segovia.  He was last seen in clinic on 5/13/2020.  During that visit, he was recommended to switch carvedilol to metoprolol succinate 25 mg daily.  Patient reports he took a dose, but developed a headache and discontinued.  Patient restarted carvedilol 3.125 mg daily twice a day.      For his symptoms, patient reports feeling well.  He denies any significant chest pain, palpitations, Orthopnea, PND, edema, shortness of breath or dizziness/lightheadedness.    He is using a prosthesis now, is ambulating a little bit throughout the day.    Patient still has not had a viability test.  MRI is now scheduled July 6, 2020    He is not weighing himself at home, but states he cannot lift uses his prosthesis.    He had some lab testing done at [facility].    Additonally, patient has the following medical problems:    -Hospitalization from 12/16/2019 through 12/27/2019 then went to rehab from 12/27/2019 through 1/10/2020.  He presented to the emergency room with left foot diabetic ulcer and black toes.  X-ray demonstrated osteomyelitis.  Patient also complained of shortness of breath and was found to have elevated troponin and diffuse T wave inversion on EKG.  Echocardiogram showed an LVEF of 30 to 35%.  Cardiology was consulted and recommended heart cath.  Patient was found to have multivessel disease.  He was started on guideline directed medical therapy.  Repeat echo showed improvement of EF to 45% and no evidence of thrombus.  CT surgery was consulted and was determined to be not a candidate due to poor distal targets for bypass.  Patient does have collateral circulation.  Patient was recommended for viability study once discharged.    -PVD    -Type 2 diabetes    -History of CVA    -Diabetic ulcer, s/p left  BKA    -Anemia    Past Medical History:   Diagnosis Date   • CAD (coronary artery disease)    • Diabetes (HCC)    • Hyperlipidemia    • Hypertension      Past Surgical History:   Procedure Laterality Date   • KNEE AMPUTATION BELOW Left 12/20/2019    Procedure: AMPUTATION, BELOW KNEE;  Surgeon: Koby Zhao M.D.;  Location: SURGERY [facility];  Service: Orthopedics   • ZZZ CARDIAC CATH  12/16/2019    multi-vessel disease   • IRRIGATION & DEBRIDEMENT ORTHO Left 6/24/2019    Procedure: IRRIGATION AND DEBRIDEMENT, WOUND-FOOT, BIOLOGIC PLACEMENT, WOUND VAC PLACEMENT;  Surgeon: Charles Chopra M.D.;  Location: SURGERY [facility];  Service: Orthopedics     Family History   Problem Relation Age of Onset   • Diabetes Mother    • Diabetes Father      Social History     Socioeconomic History   • Marital status:      Spouse name: Not on file   • Number of children: Not on file   • Years of education: Not on file   • Highest education level: Not on file   Occupational History   • Not on file   Social Needs   • Financial resource strain: Not on file   • Food insecurity     Worry: Not on file     Inability: Not on file   • Transportation needs     Medical: Not on file     Non-medical: Not on file   Tobacco Use   • Smoking status: Never Smoker   • Smokeless tobacco: Never Used   Substance and Sexual Activity   • Alcohol use: No   • Drug use: No   • Sexual activity: Not on file   Lifestyle   • Physical activity     Days per week: Not on file     Minutes per session: Not on file   • Stress: Not on file   Relationships   • Social connections     Talks on phone: Not on file     Gets together: Not on file     Attends [employer] service: Not on file     Active member of club or organization: Not on file     Attends meetings of clubs or organizations: Not on file     Relationship status: Not on file   • Intimate partner violence     Fear of current or ex partner: Not on file     Emotionally abused: Not on file      Physically abused: Not on file     Forced sexual activity: Not on file   Other Topics Concern   • Not on file   Social History Narrative   • Not on file     No Known Allergies  Outpatient Encounter Medications as of 5/28/2020   Medication Sig Dispense Refill   • lisinopril (PRINIVIL) 2.5 MG Tab Take 1 Tab by mouth every day. 90 Tab 3   • atorvastatin (LIPITOR) 80 MG tablet Take 1 Tab by mouth every day. 90 Tab 3   • Metoprolol Succinate 25 MG Capsule ER 24 Hour Sprinkle Take 1 Tab by mouth every bedtime.     • glipiZIDE (GLUCOTROL) 5 MG Tab Take 5 mg by mouth 2 Times a Day.     • clopidogrel (PLAVIX) 75 MG Tab Take 1 Tab by mouth every bedtime. 30 Tab 11   • aspirin 81 MG EC tablet Take 1 Tab by mouth every day. 30 Tab 3   • ferrous sulfate 325 (65 Fe) MG tablet Take 1 Tab by mouth every morning with breakfast. 30 Tab 1   • metformin (GLUCOPHAGE) 1000 MG tablet Take 1 Tab by mouth 2 times a day, with meals. 60 Tab 2   • ascorbic acid (VITAMIN C) 500 MG tablet Take 1 Tab by mouth 2 Times a Day. 60 Tab 1   • [DISCONTINUED] carvedilol (COREG) 3.125 MG Tab Take 3.125 mg by mouth 2 times a day, with meals.     • [DISCONTINUED] metoprolol SR (TOPROL XL) 25 MG TABLET SR 24 HR Take 1 Tab by mouth every day. (Patient not taking: Reported on 5/28/2020) 90 Tab 3   • [DISCONTINUED] atorvastatin (LIPITOR) 80 MG tablet Take 1 Tab by mouth every day. 90 Tab 2     No facility-administered encounter medications on file as of 5/28/2020.      Review of Systems   Constitutional: Negative for fever and malaise/fatigue.   Respiratory: Negative for cough and shortness of breath.    Cardiovascular: Negative for chest pain, palpitations, orthopnea, claudication, leg swelling and PND.   Gastrointestinal: Negative for abdominal pain.   Musculoskeletal: Negative for myalgias.        Left BKA   Neurological: Positive for headaches. Negative for dizziness.   All other systems reviewed and are negative.       Objective:   /56 (BP Location:  Left arm, Patient Position: Sitting, BP Cuff Size: Adult)   Pulse 91   Ht 1.829 m (6')   Wt 79.8 kg (176 lb)   BMI 23.87 kg/m²     Physical Exam   Constitutional: He is oriented to person, place, and time. He appears well-developed and well-nourished.   HENT:   Head: Normocephalic and atraumatic.   Eyes: EOM and lids are normal.   Neck: Normal range of motion.   Pulmonary/Chest: Effort normal.   Musculoskeletal:      Comments: Left BKA   Neurological: He is oriented to person, place, and time.   Skin: Skin is intact.   Psychiatric: He has a normal mood and affect. His speech is normal and behavior is normal. Judgment and thought content normal. Cognition and memory are normal.     Lab Results   Component Value Date/Time    CHOLSTRLTOT 262 (H) 06/15/2018 10:20 PM     (H) 06/15/2018 10:20 PM    HDL 54 06/15/2018 10:20 PM    TRIGLYCERIDE 132 06/15/2018 10:20 PM       Lab Results   Component Value Date/Time    SODIUM 138 03/18/2020 10:50 AM    POTASSIUM 4.2 03/18/2020 10:50 AM    CHLORIDE 103 03/18/2020 10:50 AM    CO2 24 03/18/2020 10:50 AM    GLUCOSE 217 (H) 03/18/2020 10:50 AM    BUN 17 03/18/2020 10:50 AM    CREATININE 0.70 03/18/2020 10:50 AM    CREATININE 0.9 05/09/2007 01:20 AM     Lab Results   Component Value Date/Time    ALKPHOSPHAT 77 03/18/2020 10:50 AM    ASTSGOT 17 03/18/2020 10:50 AM    ALTSGPT 16 03/18/2020 10:50 AM    TBILIRUBIN 0.3 03/18/2020 10:50 AM      Transthoracic Echo Report 6/17/2018  Normal left ventricular systolic function.  Mild concentric left ventricular hypertrophy.  No significant valve abnormalities.   No prior study is available for comparison.    Transthoracic Echo Report 12/16/2019  Severely reduced left ventricular systolic function. Left ventricular   ejection fraction is visually estimated to be 30-35%.   There appears to be a linear echodensity which is adjacent to the LV apex concerning for possible small LV thrombus. May consider repeat limted echo in 1-2 days for  re-evaluation if clinically indicated.    Normal inferior vena cava size and inspiratory collapse.  Indeterminate diastolic function.  Aortic sclerosis without stenosis.  Dr. Segovia is aware of the above findings.     Heart Cath 12/16/2019  Conclusions    1. Severe LV systolic dysfunction by noninvasive evaluation.    2. Severe and diffuse obstructive three-vessel coronary artery disease.    3. Normal LVEDP.     Post-Procedure Diagnosis    Ischemic cardiomyopathy.     Recommendations    * Medical therapy of coronary artery disease and ischemic cardiomyopathy.  Consider nonurgent and staged revascularization, preferably bypass surgery.    * Would be reasonable to proceed with planned amputation of the foot prior  to coronary artery revascularization.  If this path is elected it would be at  increased risk of perioperative cardiovascular complications and I would  recommend perioperative antiplatelet medication statin therapy beta-blockade  and close attention to avoid hemodynamic stressors for excess blood loss/fluid  shifts.     Transthoracic Echo Report 12/18/2019  Limited Exam for LV Apical Thrombus.   No thrombus. Mildly reduced left ventricular systolic function.  Left ventricular ejection fraction is visually estimated to be 45%.  There is apical septum and apical akinesis.     Compared to the Prior Echo on 12/16/19: The LVEF has improved from 35% to 45%. There was no LV thrombus visualized on either study.     Assessment:     1. Coronary artery disease involving native coronary artery of native heart, angina presence unspecified  Lipid Profile   2. Mixed hyperlipidemia  Lipid Profile   3. Ischemic cardiomyopathy  Lipid Profile   4. ACC/AHA stage C systolic heart failure (HCC)     5. Heart failure, NYHA class 1 ([facility])     6. PVD (peripheral vascular disease) ([facility])     7. Uncontrolled type 2 diabetes mellitus with hyperglycemia ([facility])         Medical Decision Making:  Today's Assessment / Status / Plan:   1.   HFrEF, stage B/C, NYHA class 1, Ischemic cardiomyopathy: LVEF 45% from 35%: He appears euvolemic  -discussed with patient to not delay medical therapy will try adding low dose ACE.  -Start Lisinopril 2.5 mg daily   -discussed to try metoprolol succinate again 25 mg at bedtime. Pt to call office if he continues to have Headaches.  -will attempt to restart Aldactone if BP allows in the future  -Cardiac MRI scheduled on 7/6/2020  -Reinforced s/sx of worsening heart failure, symptoms with patient and weight monitoring. Pt verbalizes understanding. Pt to call office or RTC if present.   -Patient had [facility], will request results    2.  CAD, multivessel disease/HLD:  -Cardiac MRI on 7/6/2020  -Continue aspirin 81 mg daily  -Continue clopidogrel 75 mg daily  -Continue atorvastatin 80 mg daily  -Reviewed results from [facility], LDL was 144, pt to start Ezetimibe 10 mg daily. May need to consider PSCK-9 inhibitor if LDL (goal of < 70) is not improved.     3.  PVD, s/p BKA:  -per above  -Patient is using prosthesis now    4.  Diabetes:  -Continue follow-up with PCP  -Continue glipizide, metformin    FU in clinic in 6 weeks with Dr. Segovia after MRI completed. Sooner if needed.    Patient verbalizes understanding and agrees with the plan of care.     Collaborating MD: Kem Godinez MD

## 2020-05-29 ENCOUNTER — TELEPHONE (OUTPATIENT)
Dept: CARDIOLOGY | Facility: MEDICAL CENTER | Age: 55
End: 2020-05-29

## 2020-05-29 VITALS
SYSTOLIC BLOOD PRESSURE: 104 MMHG | DIASTOLIC BLOOD PRESSURE: 64 MMHG | RESPIRATION RATE: 16 BRPM | OXYGEN SATURATION: 98 % | TEMPERATURE: 97.6 F | HEART RATE: 95 BPM

## 2020-05-29 ASSESSMENT — ENCOUNTER SYMPTOMS: MENTAL STATUS CHANGE: 0

## 2020-05-29 NOTE — TELEPHONE ENCOUNTER
PAR approved:    Israel Aviles Almanzar: [id number] - PA Case ID: [id number] - Rx #: [id number]   Need help? Call us at [phone]       Outcome    Approvedtoday  PA Case: [id number], Status: Approved, Coverage Starts on: 5/29/2020 12:00:00 AM, Coverage Ends on: 5/29/2021 12:00:00 AM.      Drug  Ezetimibe 10MG tablets  Form  [place] [employer] Electronic PA Form (2017 N[employer])      Original Claim Info  76,75

## 2020-06-02 ENCOUNTER — HOSPITAL ENCOUNTER (OUTPATIENT)
Dept: LAB | Facility: MEDICAL CENTER | Age: 55
End: 2020-06-02
Attending: INTERNAL MEDICINE
Payer: COMMERCIAL

## 2020-06-02 DIAGNOSIS — I50.20 HFREF (HEART FAILURE WITH REDUCED EJECTION FRACTION) (HCC): ICD-10-CM

## 2020-06-02 DIAGNOSIS — I25.5 ISCHEMIC CARDIOMYOPATHY: ICD-10-CM

## 2020-06-02 DIAGNOSIS — I25.10 CORONARY ARTERY DISEASE INVOLVING NATIVE CORONARY ARTERY OF NATIVE HEART, ANGINA PRESENCE UNSPECIFIED: ICD-10-CM

## 2020-06-02 PROCEDURE — 80053 COMPREHEN METABOLIC PANEL: CPT

## 2020-06-02 PROCEDURE — 36415 COLL VENOUS BLD VENIPUNCTURE: CPT

## 2020-06-03 ENCOUNTER — TELEPHONE (OUTPATIENT)
Dept: CARDIOLOGY | Facility: MEDICAL CENTER | Age: 55
End: 2020-06-03

## 2020-06-03 LAB
ALBUMIN SERPL BCP-MCNC: 4.1 G/DL (ref 3.2–4.9)
ALBUMIN/GLOB SERPL: 1.2 G/DL
ALP SERPL-CCNC: 70 U/L (ref 30–99)
ALT SERPL-CCNC: 17 U/L (ref 2–50)
ANION GAP SERPL CALC-SCNC: 12 MMOL/L (ref 7–16)
AST SERPL-CCNC: 20 U/L (ref 12–45)
BILIRUB SERPL-MCNC: 0.3 MG/DL (ref 0.1–1.5)
BUN SERPL-MCNC: 18 MG/DL (ref 8–22)
CALCIUM SERPL-MCNC: 9.5 MG/DL (ref 8.5–10.5)
CHLORIDE SERPL-SCNC: 100 MMOL/L (ref 96–112)
CO2 SERPL-SCNC: 25 MMOL/L (ref 20–33)
CREAT SERPL-MCNC: 0.85 MG/DL (ref 0.5–1.4)
GLOBULIN SER CALC-MCNC: 3.5 G/DL (ref 1.9–3.5)
GLUCOSE SERPL-MCNC: 114 MG/DL (ref 65–99)
POTASSIUM SERPL-SCNC: 5 MMOL/L (ref 3.6–5.5)
PROT SERPL-MCNC: 7.6 G/DL (ref 6–8.2)
SODIUM SERPL-SCNC: 137 MMOL/L (ref 135–145)

## 2020-06-03 NOTE — TELEPHONE ENCOUNTER
"Called patient for HF education, appeared that there may have been a bit of a language Barrier but patient verbalized understanding. States BP \"normal\" Running- 118-121/ 58-69 normally.   Education Narrative  Reviewed anatomy and physiology of heart failure with patient. Went over heart failure worksheet and patient's individual HF diagnosis, EF, risk factors, general medication classes and indications, as well as personal goals.  Goals: Patient's primary goal is to increase activity.     Discussed daily weights, sodium restriction, worsening signs and symptoms to report to physician, heart medications, and importance of adherence to medication regimen. Emphasized recommendation from AHA/AAHFN to keep daily sodium intake between 1500mg-2000mg.      Reviewed dietary handouts, advanced care planning classes, and advance directive planning handout. Discussed dietary considerations and reviewed Seven Day Heart Healthy Meal Plan by [employer].     Invited patient and family members/friends to HF support group and encouraged patient to call Heart Failure clinic during normal business hours with any questions.  Heart Failure program card with number given to patient.           Patient states full understanding of all information given.     "

## 2020-06-08 ENCOUNTER — HOME CARE VISIT (OUTPATIENT)
Dept: HOME HEALTH SERVICES | Facility: HOME HEALTHCARE | Age: 55
End: 2020-06-08
Payer: COMMERCIAL

## 2020-06-08 PROCEDURE — G0299 HHS/HOSPICE OF RN EA 15 MIN: HCPCS

## 2020-06-09 VITALS
DIASTOLIC BLOOD PRESSURE: 60 MMHG | RESPIRATION RATE: 17 BRPM | TEMPERATURE: 97.4 F | HEART RATE: 87 BPM | SYSTOLIC BLOOD PRESSURE: 113 MMHG | OXYGEN SATURATION: 99 %

## 2020-06-22 ENCOUNTER — HOME CARE VISIT (OUTPATIENT)
Dept: HOME HEALTH SERVICES | Facility: HOME HEALTHCARE | Age: 55
End: 2020-06-22
Payer: COMMERCIAL

## 2020-06-22 VITALS
TEMPERATURE: 97.2 F | SYSTOLIC BLOOD PRESSURE: 118 MMHG | RESPIRATION RATE: 16 BRPM | DIASTOLIC BLOOD PRESSURE: 70 MMHG | HEART RATE: 74 BPM | OXYGEN SATURATION: 99 %

## 2020-06-22 PROCEDURE — G0299 HHS/HOSPICE OF RN EA 15 MIN: HCPCS

## 2020-06-22 PROCEDURE — 665003 FOLLOW UP-HOME HEALTH

## 2020-06-23 ASSESSMENT — ACTIVITIES OF DAILY LIVING (ADL): TRANSPORTATION COMMENTS: L BKA

## 2020-07-02 ENCOUNTER — TELEPHONE (OUTPATIENT)
Dept: CARDIOLOGY | Facility: MEDICAL CENTER | Age: 55
End: 2020-07-02

## 2020-07-02 NOTE — TELEPHONE ENCOUNTER
Called and s/w Edilma RN. After 20 minutes of discussion and informing that pt has known CAD and cardiac MRI is to assess for viability for revascularization, she is unable to approve from RN standpoint because pt did not have stress echo of NM stress test done. It will need to go to provider peer to peer. VR agrees to do this now and speaks with their physician, who approves the cardiac MRI.    Approval #[id number]  Valid from 6/29-7/28.

## 2020-07-02 NOTE — TELEPHONE ENCOUNTER
Ruby calling to let you know in the interest of time she is calling pt to see if he is willing to reschedule.

## 2020-07-02 NOTE — TELEPHONE ENCOUNTER
VR order      Ruby from imaging called re: MRI order, she needs help on getting this authorized. She has been working on this since 6/29 & this test is scheduled for 7/6. She's not sure if peer to peer is needed she just says a provider has to call [facility].     Ext. [phone]     [phone] - [facility]  ID# [id number]

## 2020-07-06 ENCOUNTER — HOSPITAL ENCOUNTER (OUTPATIENT)
Dept: RADIOLOGY | Facility: MEDICAL CENTER | Age: 55
End: 2020-07-06
Attending: INTERNAL MEDICINE
Payer: COMMERCIAL

## 2020-07-06 DIAGNOSIS — I50.20 HFREF (HEART FAILURE WITH REDUCED EJECTION FRACTION) (HCC): ICD-10-CM

## 2020-07-06 DIAGNOSIS — I25.10 CORONARY ARTERY DISEASE INVOLVING NATIVE CORONARY ARTERY OF NATIVE HEART, ANGINA PRESENCE UNSPECIFIED: ICD-10-CM

## 2020-07-06 PROCEDURE — 700117 HCHG RX CONTRAST REV CODE 255: Performed by: INTERNAL MEDICINE

## 2020-07-06 PROCEDURE — A9577 INJ MULTIHANCE: HCPCS | Performed by: INTERNAL MEDICINE

## 2020-07-06 PROCEDURE — 75561 CARDIAC MRI FOR MORPH W/DYE: CPT

## 2020-07-06 RX ADMIN — GADOBENATE DIMEGLUMINE 20 ML: 529 INJECTION, SOLUTION INTRAVENOUS at 11:27

## 2020-07-07 ENCOUNTER — TELEPHONE (OUTPATIENT)
Dept: CARDIOLOGY | Facility: MEDICAL CENTER | Age: 55
End: 2020-07-07

## 2020-07-07 DIAGNOSIS — I50.20 HFREF (HEART FAILURE WITH REDUCED EJECTION FRACTION) (HCC): ICD-10-CM

## 2020-07-07 DIAGNOSIS — I25.5 ISCHEMIC CARDIOMYOPATHY: ICD-10-CM

## 2020-07-07 DIAGNOSIS — I25.10 CORONARY ARTERY DISEASE INVOLVING NATIVE CORONARY ARTERY OF NATIVE HEART, ANGINA PRESENCE UNSPECIFIED: ICD-10-CM

## 2020-07-07 RX ORDER — METOPROLOL SUCCINATE 25 MG/1
25 TABLET, EXTENDED RELEASE ORAL EVERY EVENING
Qty: 90 TAB | Refills: 0 | Status: SHIPPED | OUTPATIENT
Start: 2020-07-07 | End: 2020-12-08 | Stop reason: SDUPTHER

## 2020-07-07 NOTE — TELEPHONE ENCOUNTER
Result Notes for MR-CARDIAC MORPH/FUNC WITH & W/O   Notes recorded by Mahesh Segovia M.D. on 7/7/2020 at 11:50 AM [facility],   Can we refer Manan to cardiothoracic surgery? He has heart failure which has improved but still with significant coronary disease with only minimal scarring on cardiac MRI and therefore has viable tissue. If they refuse to operate will refer to Dr Becker as he did the diagnostic cath.   Thank you!      Called pt and discussed MRI results and recommendations for referral to cardiac surgery. Pt seems hesitant and says that he needs to discuss with his family, but then explained that he doesn't need to make a decision about surgery at this time. We are only referring to have a discussion with a surgeon to see if he is a candidate. Pt seems more agreeable to this. Referral placed. Pt then says that he will need to call us back later and requests our office number. Provided him with this.

## 2020-07-10 ENCOUNTER — HOME CARE VISIT (OUTPATIENT)
Dept: HOME HEALTH SERVICES | Facility: HOME HEALTHCARE | Age: 55
End: 2020-07-10
Payer: COMMERCIAL

## 2020-07-10 VITALS
RESPIRATION RATE: 16 BRPM | OXYGEN SATURATION: 99 % | HEART RATE: 76 BPM | SYSTOLIC BLOOD PRESSURE: 85 MMHG | TEMPERATURE: 97.5 F | DIASTOLIC BLOOD PRESSURE: 64 MMHG

## 2020-07-10 PROCEDURE — G0493 RN CARE EA 15 MIN HH/HOSPICE: HCPCS

## 2020-07-12 ASSESSMENT — PATIENT HEALTH QUESTIONNAIRE - PHQ9: CLINICAL INTERPRETATION OF PHQ2 SCORE: 0

## 2020-07-12 ASSESSMENT — ACTIVITIES OF DAILY LIVING (ADL)
OASIS_M1830: 01
TRANSPORTATION COMMENTS: L BKA
HOME_HEALTH_OASIS: 00

## 2020-07-14 ENCOUNTER — TELEPHONE (OUTPATIENT)
Dept: CARDIOTHORACIC SURGERY | Facility: MEDICAL CENTER | Age: 55
End: 2020-07-14

## 2020-07-14 NOTE — TELEPHONE ENCOUNTER
Called patient to schedule with Dr. Mendoza, patient was driving and asked to be called at another time.  I let patient know that I would reach out to him in the next day or two.

## 2020-08-05 ENCOUNTER — TELEPHONE (OUTPATIENT)
Dept: CARDIOTHORACIC SURGERY | Facility: MEDICAL CENTER | Age: 55
End: 2020-08-05

## 2020-08-05 NOTE — TELEPHONE ENCOUNTER
Patient has referral to CTS, called to schedule, patient states he would like to wait and speak to his cardiologist again before making the appointment.

## 2020-08-27 ENCOUNTER — TELEMEDICINE (OUTPATIENT)
Dept: CARDIOLOGY | Facility: MEDICAL CENTER | Age: 55
End: 2020-08-27
Payer: COMMERCIAL

## 2020-08-27 VITALS
HEIGHT: 72 IN | SYSTOLIC BLOOD PRESSURE: 113 MMHG | WEIGHT: 166 LBS | DIASTOLIC BLOOD PRESSURE: 67 MMHG | BODY MASS INDEX: 22.48 KG/M2 | HEART RATE: 87 BPM

## 2020-08-27 DIAGNOSIS — E11.65 UNCONTROLLED TYPE 2 DIABETES MELLITUS WITH HYPERGLYCEMIA (HCC): ICD-10-CM

## 2020-08-27 DIAGNOSIS — I25.10 CORONARY ARTERY DISEASE INVOLVING NATIVE CORONARY ARTERY OF NATIVE HEART, ANGINA PRESENCE UNSPECIFIED: ICD-10-CM

## 2020-08-27 PROCEDURE — 99215 OFFICE O/P EST HI 40 MIN: CPT | Mod: 95,CR | Performed by: INTERNAL MEDICINE

## 2020-08-27 RX ORDER — MINOCYCLINE HYDROCHLORIDE 100 MG/1
CAPSULE ORAL
COMMUNITY
Start: 2020-07-08 | End: 2021-01-20

## 2020-08-27 ASSESSMENT — ENCOUNTER SYMPTOMS
DECREASED APPETITE: 0
IRREGULAR HEARTBEAT: 0
SYNCOPE: 0
NAUSEA: 0
DEPRESSION: 0
COUGH: 0
NEAR-SYNCOPE: 0
CONSTIPATION: 0
ALTERED MENTAL STATUS: 0
ABDOMINAL PAIN: 0
HEARTBURN: 0
WEIGHT LOSS: 0
SHORTNESS OF BREATH: 0
DIZZINESS: 0
CLAUDICATION: 0
BLURRED VISION: 0
DYSPNEA ON EXERTION: 0
FLANK PAIN: 0
PND: 0
PALPITATIONS: 0
BACK PAIN: 0
ORTHOPNEA: 0
FEVER: 0
WEIGHT GAIN: 0
DIARRHEA: 0
VOMITING: 0

## 2020-08-27 ASSESSMENT — FIBROSIS 4 INDEX: FIB4 SCORE: 1.72

## 2020-08-27 NOTE — PROGRESS NOTES
This encounter was conducted via ZOOM.  Verbal consent was obtained. Patient's identity was verified.    Cardiology Note    Heart failure    History of Present Illness: Israel Aviles is a 54 year old man PMH DM2, HTN, PAD s/p angioplasty c/b osteomyelitis s/p LLE BKA 12/20/19; incidentally found new HFrEF 30-35% improved to 46% by cMRI, and obstructive multivessel CAD presents for follow up.    Light headed and dizzy in the morning and therefore takes half dose metoprolol. Did not tolerate lisinopril for same reason. Does exercise and work around house and back yard. Does not feel limited. Walks almost a mile in a given day without stopping. Compliant with medications.    Review of Systems   Constitution: Negative for decreased appetite, fever, malaise/fatigue, weight gain and weight loss.   HENT: Negative for congestion and nosebleeds.    Eyes: Negative for blurred vision.   Cardiovascular: Negative for chest pain, claudication, dyspnea on exertion, irregular heartbeat, leg swelling, near-syncope, orthopnea, palpitations, paroxysmal nocturnal dyspnea and syncope.   Respiratory: Negative for cough and shortness of breath.    Endocrine: Negative for cold intolerance and heat intolerance.   Skin: Negative for rash.   Musculoskeletal: Negative for back pain.   Gastrointestinal: Negative for abdominal pain, constipation, diarrhea, heartburn, melena, nausea and vomiting.   Genitourinary: Negative for dysuria, flank pain and hematuria.   Neurological: Negative for dizziness.   Psychiatric/Behavioral: Negative for altered mental status and depression.         Past Medical History:   Diagnosis Date   • CAD (coronary artery disease)    • Diabetes (HCC)    • Hyperlipidemia    • Hypertension          Past Surgical History:   Procedure Laterality Date   • KNEE AMPUTATION BELOW Left 12/20/2019    Procedure: AMPUTATION, BELOW KNEE;  Surgeon: Koby Zhao M.D.;  Location: SURGERY [facility];  Service: Orthopedics    • ZZZ CARDIAC CATH  12/16/2019    multi-vessel disease   • IRRIGATION & DEBRIDEMENT ORTHO Left 6/24/2019    Procedure: IRRIGATION AND DEBRIDEMENT, WOUND-FOOT, BIOLOGIC PLACEMENT, WOUND VAC PLACEMENT;  Surgeon: Charles Chopra M.D.;  Location: SURGERY [facility];  Service: Orthopedics         Current Outpatient Medications   Medication Sig Dispense Refill   • minocycline (MINOCIN) 100 MG Cap TAKE 1 CAPSULE BY MOUTH ONCE DAILY AFTER A MEAL     • metoprolol SR (TOPROL XL) 25 MG TABLET SR 24 HR Take 1 Tab by mouth every evening. 90 Tab 0   • atorvastatin (LIPITOR) 80 MG tablet Take 1 Tab by mouth every day. 90 Tab 3   • ezetimibe (ZETIA) 10 MG Tab Take 1 Tab by mouth every day. 90 Tab 3   • glipiZIDE (GLUCOTROL) 5 MG Tab Take 5 mg by mouth 2 Times a Day.     • clopidogrel (PLAVIX) 75 MG Tab Take 1 Tab by mouth every bedtime. 30 Tab 11   • aspirin 81 MG EC tablet Take 1 Tab by mouth every day. 30 Tab 3   • ferrous sulfate 325 (65 Fe) MG tablet Take 1 Tab by mouth every morning with breakfast. 30 Tab 1   • metformin (GLUCOPHAGE) 1000 MG tablet Take 1 Tab by mouth 2 times a day, with meals. 60 Tab 2   • ascorbic acid (VITAMIN C) 500 MG tablet Take 1 Tab by mouth 2 Times a Day. 60 Tab 1   • mupirocin (BACTROBAN) 2 % Ointment APPLY OINTMENT TOPICALLY TO ULCER ON RIGHT HEEL ONCE DAILY       No current facility-administered medications for this visit.          No Known Allergies      Family History   Problem Relation Age of Onset   • Diabetes Mother    • Diabetes Father          Social History     Socioeconomic History   • Marital status:      Spouse name: Not on file   • Number of children: Not on file   • Years of education: Not on file   • Highest education level: Not on file   Occupational History   • Not on file   Social Needs   • Financial resource strain: Not on file   • Food insecurity     Worry: Not on file     Inability: Not on file   • Transportation needs     Medical: Not on file     Non-medical: Not  on file   Tobacco Use   • Smoking status: Never Smoker   • Smokeless tobacco: Never Used   Substance and Sexual Activity   • Alcohol use: No   • Drug use: No   • Sexual activity: Not on file   Lifestyle   • Physical activity     Days per week: Not on file     Minutes per session: Not on file   • Stress: Not on file   Relationships   • Social connections     Talks on phone: Not on file     Gets together: Not on file     Attends Christian service: Not on file     Active member of club or organization: Not on file     Attends meetings of clubs or organizations: Not on file     Relationship status: Not on file   • Intimate partner violence     Fear of current or ex partner: Not on file     Emotionally abused: Not on file     Physically abused: Not on file     Forced sexual activity: Not on file   Other Topics Concern   • Not on file   Social History Narrative   • Not on file         Physical Exam:  Ambulatory Vitals  /67 (BP Location: Left arm, Patient Position: Sitting, BP Cuff Size: Adult)   Pulse 87   Ht 1.829 m (6')   Wt 75.3 kg (166 lb)    BP Readings from Last 4 Encounters:   08/27/20 113/67   07/10/20 (!) 85/64   06/22/20 118/70   06/08/20 113/60     Weight/BMI:   Vitals:    08/27/20 0818   BP: 113/67   Weight: 75.3 kg (166 lb)   Height: 1.829 m (6')    Body mass index is 22.51 kg/m².  Wt Readings from Last 4 Encounters:   08/27/20 75.3 kg (166 lb)   05/28/20 79.8 kg (176 lb)   05/13/20 75.8 kg (167 lb)   03/18/20 72.6 kg (160 lb)       Physical Exam  Physical Exam:  Constitutional: Alert, no distress, well-groomed.  Skin: No rashes in visible areas.  Eye: Round. Conjunctiva clear, lids normal. No icterus.   ENMT: Lips pink without lesions, good dentition, moist mucous membranes. Phonation normal.  Neck: No masses, no thyromegaly. Moves freely without pain.  CV: Pulse as reported by patient  Respiratory: Unlabored respiratory effort, no cough or audible wheeze  Psych: Alert and oriented x3, normal affect  and mood.     Lab Data Review:  Lab Results   Component Value Date/Time    CHOLSTRLTOT 262 (H) 06/15/2018 10:20 PM     (H) 06/15/2018 10:20 PM    HDL 54 06/15/2018 10:20 PM    TRIGLYCERIDE 132 06/15/2018 10:20 PM       Lab Results   Component Value Date/Time    SODIUM 137 06/02/2020 09:34 AM    POTASSIUM 5.0 06/02/2020 09:34 AM    CHLORIDE 100 06/02/2020 09:34 AM    CO2 25 06/02/2020 09:34 AM    GLUCOSE 114 (H) 06/02/2020 09:34 AM    BUN 18 06/02/2020 09:34 AM    CREATININE 0.85 06/02/2020 09:34 AM    CREATININE 0.9 05/09/2007 01:20 AM     CrCl cannot be calculated (Patient's most recent lab result is older than the maximum 7 days allowed.).  Lab Results   Component Value Date/Time    ALKPHOSPHAT 70 06/02/2020 09:34 AM    ASTSGOT 20 06/02/2020 09:34 AM    ALTSGPT 17 06/02/2020 09:34 AM    TBILIRUBIN 0.3 06/02/2020 09:34 AM      Lab Results   Component Value Date/Time    WBC 4.5 (L) 03/18/2020 10:50 AM     Lab Results   Component Value Date/Time    HBA1C 8.5 (H) 12/17/2019 12:33 AM     No components found for: TROP      Cardiac Imaging and Procedures Review:      Cardiac MRI 7/6/20  1. Diffuse subendocardial late gadolinium enhancement involving the apex, apical septal, apical inferior, apical lateral, apical anterior, in keeping with prior infarct.     The apex is thinning with more than 75% enhancement, likely transmural infarct with no viability.     The other apical septal, apical inferior, apical lateral, apical anterior segments have 40-60% late gadolinium enhancement of the myocardial thickness.     2. Akinesis of the apical septum and apical segments. Hypokinesis of the apical anterior segment. Grossly preserved contraction in the apical lateral and apical inferior segments.     Ejection fraction of the left ventricle is 46 %, similar to prior.     3. No thrombus in the left ventricular apex.    TTE 12/18/2019  CONCLUSIONS  Limited Exam for LV Apical Thrombus.   No thrombus. Mildly reduced left  ventricular systolic function.  Left ventricular ejection fraction is visually estimated to be 45%.  There is apical septum and apical akinesis.    TTE 12/16/19  CONCLUSIONS  Severely reduced left ventricular systolic function. Left ventricular   ejection fraction is visually estimated to be 30-35%.   There appears to be a linear echodensity which is adjacent to the LV   apex concerning for possible small LV thrombus. May consider repeat   limted echo in 1-2 days for re-evaluation if clinically indicated.    Normal inferior vena cava size and inspiratory collapse.  Indeterminate diastolic function.  Aortic sclerosis without stenosis.     [facility] 12/2019  Coronary Diagnostic Findings    * Left main: Distal tapering with 50% stenosis.    * Left anterior descending: Diffuse atherosclerosis and negative remodeling  beginning at the origin.  There are sequential 70% stenosis in the proximal  and mid segment of the vessel.  There is subtotal occlusion in the mid to  distal segment with AGATHA II flow into the apical LAD. The first diagonal has  proximal 70% stenosis and is diffusely diseased and negatively remodeled with  95% stenosis in the lower branch.    * Left circumflex: Mid vessel 70-80% stenosis.  The OM1 has diffuse  atherosclerosis there is less than 1.5 mm in size and without significant  narrowing.  The OM 2 bifurcates proximally the upper branch has proximal 50%  stenosis and is a less than 1.5 mm vessel.  The lower branch is a 2.25 mm  vessel and has proximal 85% stenosis.    * Right coronary artery: Dominant, diffuse 60% stenosis in the proximal mid  and distal AV groove.  There is a dual PDA.  The first PDA has a mid 80%  stenosis and diffuse disease downstream.  The second PDA has proximal 80%  stenosis.  Conclusions    1. Severe LV systolic dysfunction by noninvasive evaluation.    2. Severe and diffuse obstructive three-vessel coronary artery disease.    3. Normal LVEDP.    Medical Decision Making:  Problem  List Items Addressed This Visit     Uncontrolled type 2 diabetes mellitus with hyperglycemia (HCC)    Relevant Orders    HEMOGLOBIN A1C (Glycohemoglobin GHB Total/A1C with MBG Estimate)    CAD (coronary artery disease)    Relevant Orders    LIPID PANEL    Basic Metabolic Panel    proBrain Natriuretic Peptide, NT    REFERRAL TO CARDIOTHORACIC SURGERY        ICM, HFrEF 46%, NYHA I, stage C - in light of orthostasis he will continue metoprolol succinate. Cannot tolerate Ace/arb. Cardiac MRI shows preserved viability with completed infarct limited to apical segments. Consider revascularization and refer to CTS. No clear indication for MRA given level of function. No need for diuretic as not symptomatic. However, if pBNP and a1c elevated with stable Bmp at repeat will add SGLT2 inhib.     Severe CAD/PAD/HLD - continue dapt and statin. Will repeat lipids next visit. Goal LDL <70. Follow up with vascular surgery.    Dm2 - follow up with endocrine and podiatry.    It was my pleasure to meet with Mr. Aviles.

## 2020-11-16 NOTE — PROGRESS NOTES
Patient A+Ox4, resting in bed. Denies pain, no nausea. Left extremity in immobilizer. On tele. Tolerating diet. PT eval pending for today. Wife at bedside, updated on plan of care. Using call bell approp, bed locked and in lowest position, special contact precautions maintained, hourly rounding in place, in room across from nurses station    Pt resting in bed hob up bed in low position side rails up call light in reach

## 2020-11-30 ENCOUNTER — TELEPHONE (OUTPATIENT)
Dept: CARDIOLOGY | Facility: MEDICAL CENTER | Age: 55
End: 2020-11-30

## 2020-11-30 NOTE — TELEPHONE ENCOUNTER
Called patient to see if he has completed fasting blood work ordered by VR to get results prior to upcoming appointment. Unable to reach patient, left voicemail for call back.

## 2020-12-08 ENCOUNTER — TELEMEDICINE (OUTPATIENT)
Dept: CARDIOLOGY | Facility: MEDICAL CENTER | Age: 55
End: 2020-12-08
Payer: COMMERCIAL

## 2020-12-08 VITALS
HEIGHT: 72 IN | SYSTOLIC BLOOD PRESSURE: 119 MMHG | HEART RATE: 89 BPM | DIASTOLIC BLOOD PRESSURE: 54 MMHG | BODY MASS INDEX: 23.84 KG/M2 | WEIGHT: 176 LBS

## 2020-12-08 DIAGNOSIS — E11.65 UNCONTROLLED TYPE 2 DIABETES MELLITUS WITH HYPERGLYCEMIA (HCC): ICD-10-CM

## 2020-12-08 DIAGNOSIS — I25.10 CORONARY ARTERY DISEASE INVOLVING NATIVE CORONARY ARTERY OF NATIVE HEART, ANGINA PRESENCE UNSPECIFIED: ICD-10-CM

## 2020-12-08 DIAGNOSIS — I25.5 ISCHEMIC CARDIOMYOPATHY: ICD-10-CM

## 2020-12-08 DIAGNOSIS — I73.9 PVD (PERIPHERAL VASCULAR DISEASE) (HCC): ICD-10-CM

## 2020-12-08 PROCEDURE — 99215 OFFICE O/P EST HI 40 MIN: CPT | Mod: 95,CR | Performed by: INTERNAL MEDICINE

## 2020-12-08 RX ORDER — EMPAGLIFLOZIN 10 MG/1
1 TABLET, FILM COATED ORAL DAILY
Qty: 90 TAB | Refills: 3 | Status: SHIPPED | OUTPATIENT
Start: 2020-12-08 | End: 2021-07-05

## 2020-12-08 RX ORDER — EZETIMIBE 10 MG/1
10 TABLET ORAL DAILY
Qty: 90 TAB | Refills: 3 | Status: SHIPPED | OUTPATIENT
Start: 2020-12-08 | End: 2021-09-09

## 2020-12-08 RX ORDER — METOPROLOL SUCCINATE 25 MG/1
12.5 TABLET, EXTENDED RELEASE ORAL EVERY EVENING
Qty: 90 TAB | Refills: 0 | Status: SHIPPED | OUTPATIENT
Start: 2020-12-08 | End: 2021-09-09 | Stop reason: SDUPTHER

## 2020-12-08 ASSESSMENT — ENCOUNTER SYMPTOMS
NAUSEA: 0
SYNCOPE: 0
DECREASED APPETITE: 0
DYSPNEA ON EXERTION: 0
FEVER: 0
HEARTBURN: 0
DEPRESSION: 0
ORTHOPNEA: 0
CLAUDICATION: 0
NEAR-SYNCOPE: 0
VOMITING: 0
ABDOMINAL PAIN: 0
DIARRHEA: 0
BACK PAIN: 0
DIZZINESS: 0
COUGH: 0
CONSTIPATION: 0
SHORTNESS OF BREATH: 0
WEIGHT GAIN: 0
PND: 0
BLURRED VISION: 0
FLANK PAIN: 0
ALTERED MENTAL STATUS: 0
PALPITATIONS: 0
IRREGULAR HEARTBEAT: 0
WEIGHT LOSS: 0

## 2020-12-08 ASSESSMENT — FIBROSIS 4 INDEX: FIB4 SCORE: 1.76

## 2020-12-08 NOTE — PROGRESS NOTES
This encounter was conducted via [facility].  Verbal consent was obtained. Patient's identity was verified.    Cardiology Note    Heart failure    History of Present Illness: Israel Aviles is a 55 year old man PMH DM2, HTN, PAD s/p angioplasty c/b osteomyelitis s/p LLE BKA 12/20/19; incidentally found new HFrEF 30-35% improved to 46% by cMRI, and obstructive multivessel CAD presents for follow up.    Doing well this visit. Denies cardiac symptoms; especially no heart failure symptoms. He gets easily hypotensive and has had multiple changes in gdmt for heart failure. Currently has not been compliant with metoprolol despite stating was tolerating 12.5mg well. There is some component of poor insight. He and his wife are very willing to adjust. Otherwise compliant with remaining medications including dapt and statin.     Follows PMD for DM Dr Domingo.     Review of Systems   Constitution: Negative for decreased appetite, fever, malaise/fatigue, weight gain and weight loss.   HENT: Negative for congestion and nosebleeds.    Eyes: Negative for blurred vision.   Cardiovascular: Negative for chest pain, claudication, dyspnea on exertion, irregular heartbeat, leg swelling, near-syncope, orthopnea, palpitations, paroxysmal nocturnal dyspnea and syncope.   Respiratory: Negative for cough and shortness of breath.    Endocrine: Negative for cold intolerance and heat intolerance.   Skin: Negative for rash.   Musculoskeletal: Negative for back pain.   Gastrointestinal: Negative for abdominal pain, constipation, diarrhea, heartburn, melena, nausea and vomiting.   Genitourinary: Negative for dysuria, flank pain and hematuria.   Neurological: Negative for dizziness.   Psychiatric/Behavioral: Negative for altered mental status and depression.         Past Medical History:   Diagnosis Date   • CAD (coronary artery disease)    • Diabetes (HCC)    • Hyperlipidemia    • Hypertension          Past Surgical History:   Procedure  Laterality Date   • KNEE AMPUTATION BELOW Left 12/20/2019    Procedure: AMPUTATION, BELOW KNEE;  Surgeon: Koby Zhao M.D.;  Location: SURGERY [facility];  Service: Orthopedics   • ZZZ CARDIAC CATH  12/16/2019    multi-vessel disease   • IRRIGATION & DEBRIDEMENT ORTHO Left 6/24/2019    Procedure: IRRIGATION AND DEBRIDEMENT, WOUND-FOOT, BIOLOGIC PLACEMENT, WOUND VAC PLACEMENT;  Surgeon: Charles Chopra M.D.;  Location: SURGERY [facility];  Service: Orthopedics         Current Outpatient Medications   Medication Sig Dispense Refill   • metoprolol SR (TOPROL XL) 25 MG TABLET SR 24 HR Take 0.5 Tabs by mouth every evening. 90 Tab 0   • Empagliflozin (JARDIANCE) 10 MG Tab Take 1 Tab by mouth every day. 90 Tab 3   • rivaroxaban (XARELTO) 2.5 MG tablet Take 1 Tab by mouth 2 times a day. 60 Tab 3   • minocycline (MINOCIN) 100 MG Cap TAKE 1 CAPSULE BY MOUTH ONCE DAILY AFTER A MEAL     • mupirocin (BACTROBAN) 2 % Ointment APPLY OINTMENT TOPICALLY TO ULCER ON RIGHT HEEL ONCE DAILY     • atorvastatin (LIPITOR) 80 MG tablet Take 1 Tab by mouth every day. (Patient taking differently: Take 40 mg by mouth every day.) 90 Tab 3   • ezetimibe (ZETIA) 10 MG Tab Take 1 Tab by mouth every day. (Patient taking differently: Take 5 mg by mouth every day.) 90 Tab 3   • aspirin 81 MG EC tablet Take 1 Tab by mouth every day. 30 Tab 3   • metformin (GLUCOPHAGE) 1000 MG tablet Take 1 Tab by mouth 2 times a day, with meals. (Patient taking differently: Take 500 mg by mouth 2 times a day, with meals.) 60 Tab 2   • ferrous sulfate 325 (65 Fe) MG tablet Take 1 Tab by mouth every morning with breakfast. (Patient not taking: Reported on 12/8/2020) 30 Tab 1   • ascorbic acid (VITAMIN C) 500 MG tablet Take 1 Tab by mouth 2 Times a Day. (Patient not taking: Reported on 12/8/2020) 60 Tab 1     No current facility-administered medications for this visit.          No Known Allergies      Family History   Problem Relation Age of Onset   •  Diabetes Mother    • Diabetes Father          Social History     Socioeconomic History   • Marital status:      Spouse name: Not on file   • Number of children: Not on file   • Years of education: Not on file   • Highest education level: Not on file   Occupational History   • Not on file   Social Needs   • Financial resource strain: Not on file   • Food insecurity     Worry: Not on file     Inability: Not on file   • Transportation needs     Medical: Not on file     Non-medical: Not on file   Tobacco Use   • Smoking status: Never Smoker   • Smokeless tobacco: Never Used   Substance and Sexual Activity   • Alcohol use: No   • Drug use: No   • Sexual activity: Not on file   Lifestyle   • Physical activity     Days per week: Not on file     Minutes per session: Not on file   • Stress: Not on file   Relationships   • Social connections     Talks on phone: Not on file     Gets together: Not on file     Attends [employer] witness service: Not on file     Active member of club or organization: Not on file     Attends meetings of clubs or organizations: Not on file     Relationship status: Not on file   • Intimate partner violence     Fear of current or ex partner: Not on file     Emotionally abused: Not on file     Physically abused: Not on file     Forced sexual activity: Not on file   Other Topics Concern   • Not on file   Social History Narrative   • Not on file         Physical Exam:  Ambulatory Vitals  /54 (BP Location: Left arm, Patient Position: Sitting, BP Cuff Size: Adult)   Pulse 89   Ht 1.829 m (6')   Wt 79.8 kg (176 lb)    BP Readings from Last 4 Encounters:   12/08/20 119/54   08/27/20 113/67   07/10/20 (!) 85/64   06/22/20 118/70     Weight/BMI:   Vitals:    12/08/20 0923   BP: 119/54   Weight: 79.8 kg (176 lb)   Height: 1.829 m (6')    Body mass index is 23.87 kg/m².  Wt Readings from Last 4 Encounters:   12/08/20 79.8 kg (176 lb)   08/27/20 75.3 kg (166 lb)   05/28/20 79.8 kg (176 lb)   05/13/20 75.8  kg (167 lb)       Physical Exam  Physical Exam:  Constitutional: Alert, no distress, well-groomed.  Skin: No rashes in visible areas.  Eye: Round. Conjunctiva clear, lids normal. No icterus.   ENMT: Lips pink without lesions, good dentition, moist mucous membranes. Phonation normal.  Neck: No masses, no thyromegaly. Moves freely without pain.  CV: Pulse as reported by patient  Respiratory: Unlabored respiratory effort, no cough or audible wheeze  Psych: Alert and oriented x3, normal affect and mood.     Lab Data Review:  Lab Results   Component Value Date/Time    CHOLSTRLTOT 262 (H) 06/15/2018 10:20 PM     (H) 06/15/2018 10:20 PM    HDL 54 06/15/2018 10:20 PM    TRIGLYCERIDE 132 06/15/2018 10:20 PM       Lab Results   Component Value Date/Time    SODIUM 137 06/02/2020 09:34 AM    POTASSIUM 5.0 06/02/2020 09:34 AM    CHLORIDE 100 06/02/2020 09:34 AM    CO2 25 06/02/2020 09:34 AM    GLUCOSE 114 (H) 06/02/2020 09:34 AM    BUN 18 06/02/2020 09:34 AM    CREATININE 0.85 06/02/2020 09:34 AM    CREATININE 0.9 05/09/2007 01:20 AM     CrCl cannot be calculated (Patient's most recent lab result is older than the maximum 7 days allowed.).  Lab Results   Component Value Date/Time    ALKPHOSPHAT 70 06/02/2020 09:34 AM    ASTSGOT 20 06/02/2020 09:34 AM    ALTSGPT 17 06/02/2020 09:34 AM    TBILIRUBIN 0.3 06/02/2020 09:34 AM      Lab Results   Component Value Date/Time    WBC 4.5 (L) 03/18/2020 10:50 AM     Lab Results   Component Value Date/Time    HBA1C 8.5 (H) 12/17/2019 12:33 AM     No components found for: TROP    Outside a1c 6.3 noted by endocrinology note Dr Mcnally 10/16/20    Cardiac Imaging and Procedures Review:      Cardiac MRI 7/6/20  1. Diffuse subendocardial late gadolinium enhancement involving the apex, apical septal, apical inferior, apical lateral, apical anterior, in keeping with prior infarct.     The apex is thinning with more than 75% enhancement, likely transmural infarct with no viability.     The  other apical septal, apical inferior, apical lateral, apical anterior segments have 40-60% late gadolinium enhancement of the myocardial thickness.     2. Akinesis of the apical septum and apical segments. Hypokinesis of the apical anterior segment. Grossly preserved contraction in the apical lateral and apical inferior segments.     Ejection fraction of the left ventricle is 46 %, similar to prior.     3. No thrombus in the left ventricular apex.    TTE 12/18/2019  CONCLUSIONS  Limited Exam for LV Apical Thrombus.   No thrombus. Mildly reduced left ventricular systolic function.  Left ventricular ejection fraction is visually estimated to be 45%.  There is apical septum and apical akinesis.    TTE 12/16/19  CONCLUSIONS  Severely reduced left ventricular systolic function. Left ventricular   ejection fraction is visually estimated to be 30-35%.   There appears to be a linear echodensity which is adjacent to the LV   apex concerning for possible small LV thrombus. May consider repeat   limted echo in 1-2 days for re-evaluation if clinically indicated.    Normal inferior vena cava size and inspiratory collapse.  Indeterminate diastolic function.  Aortic sclerosis without stenosis.     [facility] 12/2019  Coronary Diagnostic Findings    * Left main: Distal tapering with 50% stenosis.    * Left anterior descending: Diffuse atherosclerosis and negative remodeling  beginning at the origin.  There are sequential 70% stenosis in the proximal  and mid segment of the vessel.  There is subtotal occlusion in the mid to  distal segment with AGATHA II flow into the apical LAD. The first diagonal has  proximal 70% stenosis and is diffusely diseased and negatively remodeled with  95% stenosis in the lower branch.    * Left circumflex: Mid vessel 70-80% stenosis.  The OM1 has diffuse  atherosclerosis there is less than 1.5 mm in size and without significant  narrowing.  The OM 2 bifurcates proximally the upper branch has proximal  50%  stenosis and is a less than 1.5 mm vessel.  The lower branch is a 2.25 mm  vessel and has proximal 85% stenosis.    * Right coronary artery: Dominant, diffuse 60% stenosis in the proximal mid  and distal AV groove.  There is a dual PDA.  The first PDA has a mid 80%  stenosis and diffuse disease downstream.  The second PDA has proximal 80%  stenosis.  Conclusions    1. Severe LV systolic dysfunction by noninvasive evaluation.    2. Severe and diffuse obstructive three-vessel coronary artery disease.    3. Normal LVEDP.    Medical Decision Making:  Problem List Items Addressed This Visit     Uncontrolled type 2 diabetes mellitus with hyperglycemia ([facility])    Relevant Medications    Empagliflozin (JARDIANCE) 10 MG Tab    Other Relevant Orders    EC-ECHOCARDIOGRAM COMPLETE W/O CONT    HEMOGLOBIN A1C    CAD (coronary artery disease)    Relevant Medications    metoprolol SR (TOPROL XL) 25 MG TABLET SR 24 HR    rivaroxaban (XARELTO) 2.5 MG tablet    Other Relevant Orders    REFERRAL TO CARDIOTHORACIC SURGERY    Ischemic cardiomyopathy    Relevant Medications    metoprolol SR (TOPROL XL) 25 MG TABLET SR 24 HR    rivaroxaban (XARELTO) 2.5 MG tablet    Other Relevant Orders    EC-ECHOCARDIOGRAM COMPLETE W/O CONT    Comp Metabolic Panel    LIPID PANEL    proBrain Natriuretic Peptide, NT    REFERRAL TO CARDIOTHORACIC SURGERY    PVD (peripheral vascular disease) ([facility])    Relevant Medications    metoprolol SR (TOPROL XL) 25 MG TABLET SR 24 HR    rivaroxaban (XARELTO) 2.5 MG tablet        ICM, HFrEF 46%, NYHA I, stage C - in light of orthostasis he will continue metoprolol succinate 12.5mg daily. Cannot tolerate Ace/arb due to hypotension. Attempt add jardiance. Change plavix (completed 12 months) to xarelto 2.5mg bid. Cardiac MRI shows preserved viability with completed infarct limited to apical segments. Consider revascularization and refer to CTS. No clear indication for MRA given level of function.     Severe CAD/PAD/HLD -  completed 12 mo dapt, switch to xarelto 2.5mg bid. Continue statin, repeat lipids. Goal LDL <70. Follow up with vascular surgery.    Dm2 - follow up with endocrine and podiatry.    It was my pleasure to meet with Mr. Aviles.    25 min spent interviewing patient.

## 2020-12-08 NOTE — PATIENT INSTRUCTIONS
Metoprolol extended-release tablets  What is this medicine?  METOPROLOL (me TOE proe lole) is a beta-blocker. Beta-blockers reduce the workload on the heart and help it to beat more regularly. This medicine is used to treat high blood pressure and to prevent chest pain. It is also used to after a heart attack and to prevent an additional heart attack from occurring.  This medicine may be used for other purposes; ask your health care provider or pharmacist if you have questions.  COMMON BRAND NAME(S): toprol, Toprol XL  What should I tell my health care provider before I take this medicine?  They need to know if you have any of these conditions:  · diabetes  · heart or vessel disease like slow heart rate, worsening heart failure, heart block, sick sinus syndrome or Raynaud's disease  · kidney disease  · liver disease  · lung or breathing disease, like asthma or emphysema  · pheochromocytoma  · thyroid disease  · an unusual or allergic reaction to metoprolol, other beta-blockers, medicines, foods, dyes, or preservatives  · pregnant or trying to get pregnant  · breast-feeding  How should I use this medicine?  Take this medicine by mouth with a glass of water. Follow the directions on the prescription label. Do not crush or chew. Take this medicine with or immediately after meals. Take your doses at regular intervals. Do not take more medicine than directed. Do not stop taking this medicine suddenly. This could lead to serious heart-related effects.  Talk to your pediatrician regarding the use of this medicine in children. While this drug may be prescribed for children as young as 6 years for selected conditions, precautions do apply.  Overdosage: If you think you have taken too much of this medicine contact a poison control center or emergency room at once.  NOTE: This medicine is only for you. Do not share this medicine with others.  What if I miss a dose?  If you miss a dose, take it as soon as you can. If it is  almost time for your next dose, take only that dose. Do not take double or extra doses.  What may interact with this medicine?  This medicine may interact with the following medications:  · certain medicines for blood pressure, heart disease, irregular heart beat  · certain medicines for depression, like monoamine oxidase (MAO) inhibitors, fluoxetine, or paroxetine  · clonidine  · dobutamine  · epinephrine  · isoproterenol  · reserpine  This list may not describe all possible interactions. Give your health care provider a list of all the medicines, herbs, non-prescription drugs, or dietary supplements you use. Also tell them if you smoke, drink alcohol, or use illegal drugs. Some items may interact with your medicine.  What should I watch for while using this medicine?  Visit your doctor or health care professional for regular check ups. Contact your doctor right away if your symptoms worsen. Check your blood pressure and pulse rate regularly. Ask your health care professional what your blood pressure and pulse rate should be, and when you should contact them.  You may get drowsy or dizzy. Do not drive, use machinery, or do anything that needs mental alertness until you know how this medicine affects you. Do not sit or stand up quickly, especially if you are an older patient. This reduces the risk of dizzy or fainting spells. Contact your doctor if these symptoms continue. Alcohol may interfere with the effect of this medicine. Avoid alcoholic drinks.  This medicine may increase blood sugar. Ask your healthcare provider if changes in diet or medicines are needed if you have diabetes.  What side effects may I notice from receiving this medicine?  Side effects that you should report to your doctor or health care professional as soon as possible:  · allergic reactions like skin rash, itching or hives  · cold or numb hands or feet  · depression  · difficulty breathing  · faint  · fever with sore throat  · irregular  heartbeat, chest pain  · rapid weight gain  ·   signs and symptoms of high blood sugar such as being more thirsty or hungry or having to urinate more than normal. You may also feel very tired or have blurry vision.  · swollen legs or ankles  Side effects that usually do not require medical attention (report to your doctor or health care professional if they continue or are bothersome):  · anxiety or nervousness  · change in sex drive or performance  · dry skin  · headache  · nightmares or trouble sleeping  · short term memory loss  · stomach upset or diarrhea  This list may not describe all possible side effects. Call your doctor for medical advice about side effects. You may report side effects to FDA at 7-747-XYW-7939.  Where should I keep my medicine?  Keep out of the reach of children.  Store at room temperature between 15 and 30 degrees C (59 and 86 degrees F). Throw away any unused medicine after the expiration date.  NOTE: This sheet is a summary. It may not cover all possible information. If you have questions about this medicine, talk to your doctor, pharmacist, or health care provider.  © 2020 [employer] (2019-10-08 11:09:41)    Empagliflozin oral tablets  What is this medicine?  EMPAGLIFLOZIN (EM pa gli FLOE zin) helps to treat type 2 diabetes. It helps to control blood sugar. This drug may also reduce the risk of heart attack or stroke if you have type 2 diabetes and risk factors for heart disease. Treatment is combined with diet and exercise.  This medicine may be used for other purposes; ask your health care provider or pharmacist if you have questions.  COMMON BRAND NAME(S): JARDIANCE  What should I tell my health care provider before I take this medicine?  They need to know if you have any of these conditions:  · dehydration  · diabetic ketoacidosis  · diet low in salt  · eating less due to illness, surgery, dieting, or any other reason  · having surgery  · high cholesterol  · high levels  of potassium in the blood  · history of pancreatitis or pancreas problems  · history of yeast infection of the penis or vagina  · if you often drink alcohol  · infections in the bladder, kidneys, or urinary tract  · kidney disease  · liver disease  · low blood pressure  · on hemodialysis  · problems urinating  · type 1 diabetes  · uncircumcised male  · an unusual or allergic reaction to empagliflozin, other medicines, foods, dyes, or preservatives  · pregnant or trying to get pregnant  · breast-feeding  How should I use this medicine?  Take this medicine by mouth with a glass of water. Follow the directions on the prescription label. Take it in the morning, with or without food. Take your dose at the same time each day. Do not take more often than directed. Do not stop taking except on your doctor's advice.  Talk to your pediatrician regarding the use of this medicine in children. Special care may be needed.  Overdosage: If you think you have taken too much of this medicine contact a poison control center or emergency room at once.  NOTE: This medicine is only for you. Do not share this medicine with others.  What if I miss a dose?  If you miss a dose, take it as soon as you can. If it is almost time for your next dose, take only that dose. Do not take double or extra doses.  What may interact with this medicine?  Do not take this medicine with any of the following medications:  · gatifloxacin  This medicine may also interact with the following medications:  · alcohol  · certain medicines for blood pressure, heart disease  · diuretics  This list may not describe all possible interactions. Give your health care provider a list of all the medicines, herbs, non-prescription drugs, or dietary supplements you use. Also tell them if you smoke, drink alcohol, or use illegal drugs. Some items may interact with your medicine.  What should I watch for while using this medicine?  Visit your doctor or health care professional  for regular checks on your progress.  This medicine can cause a serious condition in which there is too much acid in the blood. If you develop nausea, vomiting, stomach pain, unusual tiredness, or breathing problems, stop taking this medicine and call your doctor right away. If possible, use a ketone dipstick to check for ketones in your urine.  A test called the HbA1C (A1C) will be monitored. This is a simple blood test. It measures your blood sugar control over the last 2 to 3 months. You will receive this test every 3 to 6 months.  Learn how to check your blood sugar. Learn the symptoms of low and high blood sugar and how to manage them.  Always carry a quick-source of sugar with you in case you have symptoms of low blood sugar. Examples include hard sugar candy or glucose tablets. Make sure others know that you can choke if you eat or drink when you develop serious symptoms of low blood sugar, such as seizures or unconsciousness. They must get medical help at once.  Tell your doctor or health care professional if you have high blood sugar. You might need to change the dose of your medicine. If you are sick or exercising more than usual, you might need to change the dose of your medicine.  Do not skip meals. Ask your doctor or health care professional if you should avoid alcohol. Many nonprescription cough and cold products contain sugar or alcohol. These can affect blood sugar.  Wear a medical ID bracelet or chain, and carry a card that describes your disease and details of your medicine and dosage times.  What side effects may I notice from receiving this medicine?  Side effects that you should report to your doctor or health care professional as soon as possible:  · allergic reactions like skin rash, itching or hives, swelling of the face, lips, or tongue  · breathing problems  · dizziness  · feeling faint or lightheaded, falls  · muscle weakness  · nausea, vomiting, unusual stomach upset or pain  · penile  discharge, itching, or pain in men  · signs and symptoms of a genital infection, such as fever; tenderness, redness, or swelling in the genitals or area from the genitals to the back of the rectum  · signs and symptoms of low blood sugar such as feeling anxious, confusion, dizziness, increased hunger, unusually weak or tired, sweating, shakiness, cold, irritable, headache, blurred vision, fast heartbeat, loss of consciousness  · signs and symptoms of a urinary tract infection, such as fever, chills, a burning feeling when urinating, blood in the urine, back pain  · trouble passing urine or change in the amount of urine, including an urgent need to urinate more often, in larger amounts, or at night  · unusual tiredness  · vaginal discharge, itching, or odor in women  Side effects that usually do not require medical attention (report to your doctor or health care professional if they continue or are bothersome):  · mild increase in urination  · thirsty  This list may not describe all possible side effects. Call your doctor for medical advice about side effects. You may report side effects to FDA at 1-869-FDA-5494.  Where should I keep my medicine?  Keep out of the reach of children.  Store at room temperature between 20 and 25 degrees C (68 and 77 degrees F). Throw away any unused medicine after the expiration date.  NOTE: This sheet is a summary. It may not cover all possible information. If you have questions about this medicine, talk to your doctor, pharmacist, or health care provider.  © 2020 [employer] Standard (2018-08-30 10:25:34)    Rivaroxaban oral tablets  What is this medicine?  RIVAROXABAN (ri va MARTA a ban) is an anticoagulant (blood thinner). It is used to treat blood clots in the lungs or in the veins. It is also used to prevent blood clots in the lungs or in the veins. It is also used to lower the chance of stroke in people with a medical condition called atrial fibrillation.  This medicine may be  used for other purposes; ask your health care provider or pharmacist if you have questions.  COMMON BRAND NAME(S): Xarelto, Xarelto Starter Pack  What should I tell my health care provider before I take this medicine?  They need to know if you have any of these conditions:  · antiphospholipid antibody syndrome  · artificial heart valve  · bleeding disorders  · bleeding in the brain  · blood in your stools (black or tarry stools) or if you have blood in your vomit  · history of blood clots  · history of stomach bleeding  · kidney disease  · liver disease  · low blood counts, like low white cell, platelet, or red cell counts  · recent or planned spinal or epidural procedure  · take medicines that treat or prevent blood clots  · an unusual or allergic reaction to rivaroxaban, other medicines, foods, dyes, or preservatives  · pregnant or trying to get pregnant  · breast-feeding  How should I use this medicine?  Take this medicine by mouth with a glass of water. Follow the directions on the prescription label. Take your medicine at regular intervals. Do not take it more often than directed. Do not stop taking except on your doctor's advice. Stopping this medicine may increase your risk of a blood clot. Be sure to refill your prescription before you run out of medicine.  If you are taking this medicine after hip or knee replacement surgery, take it with or without food. If you are taking this medicine for atrial fibrillation, take it with your evening meal. If you are taking this medicine to treat blood clots, take it with food at the same time each day. If you are unable to swallow your tablet, you may crush the tablet and mix it in applesauce. Then, immediately eat the applesauce. You should eat more food right after you eat the applesauce containing the crushed tablet.  Talk to your pediatrician regarding the use of this medicine in children. Special care may be needed.  Overdosage: If you think you have taken too much  of this medicine contact a poison control center or emergency room at once.  NOTE: This medicine is only for you. Do not share this medicine with others.  What if I miss a dose?  If you take your medicine once a day and miss a dose, take the missed dose as soon as you remember. If it is almost time for your next dose, take only that dose. Do not take double or extra doses.  If you take your medicine twice a day and miss a dose, take the missed dose immediately. In this instance, 2 tablets may be taken at the same time. The next day you should take 1 tablet twice a day as directed.  What may interact with this medicine?  Do not take this medicine with any of the following medications:  · defibrotide  This medicine may also interact with the following medications:  · aspirin and aspirin-like medicines  · certain antibiotics like erythromycin, azithromycin, and clarithromycin  · certain medicines for fungal infections like ketoconazole and itraconazole  · certain medicines for irregular heart beat like amiodarone, quinidine, dronedarone  · certain medicines for seizures like carbamazepine, phenytoin  · certain medicines that treat or prevent blood clots like warfarin, enoxaparin, and dalteparin  · conivaptan  · felodipine  · indinavir  · lopinavir; ritonavir  · NSAIDS, medicines for pain and inflammation, like ibuprofen or naproxen  · ranolazine  · rifampin  · ritonavir  · SNRIs, medicines for depression, like desvenlafaxine, duloxetine, levomilnacipran, venlafaxine  · SSRIs, medicines for depression, like citalopram, escitalopram, fluoxetine, fluvoxamine, paroxetine, sertraline  · [place] wort  · verapamil  This list may not describe all possible interactions. Give your health care provider a list of all the medicines, herbs, non-prescription drugs, or dietary supplements you use. Also tell them if you smoke, drink alcohol, or use illegal drugs. Some items may interact with your medicine.  What should I watch for  while using this medicine?  Visit your healthcare professional for regular checks on your progress. You may need blood work done while you are taking this medicine. Your condition will be monitored carefully while you are receiving this medicine. It is important not to miss any appointments.  Avoid sports and activities that might cause injury while you are using this medicine. Severe falls or injuries can cause unseen bleeding. Be careful when using sharp tools or knives. Consider using an electric razor. Take special care brushing or flossing your teeth. Report any injuries, bruising, or red spots on the skin to your healthcare professional.  If you are going to need surgery or other procedure, tell your healthcare professional that you are taking this medicine.  Wear a medical ID bracelet or chain. Carry a card that describes your disease and details of your medicine and dosage times.  What side effects may I notice from receiving this medicine?  Side effects that you should report to your doctor or health care professional as soon as possible:  · allergic reactions like skin rash, itching or hives, swelling of the face, lips, or tongue  · back pain  · redness, blistering, peeling or loosening of the skin, including inside the mouth  · signs and symptoms of bleeding such as bloody or black, tarry stools; red or dark-brown urine; spitting up blood or brown material that looks like coffee grounds; red spots on the skin; unusual bruising or bleeding from the eye, gums, or nose  · signs and symptoms of a blood clot such as chest pain; shortness of breath; pain, swelling, or warmth in the leg  · signs and symptoms of a stroke such as changes in vision; confusion; trouble speaking or understanding; severe headaches; sudden numbness or weakness of the face, arm or leg; trouble walking; dizziness; loss of coordination  Side effects that usually do not require medical attention (report to your doctor or health care  professional if they continue or are bothersome):  · dizziness  · muscle pain  This list may not describe all possible side effects. Call your doctor for medical advice about side effects. You may report side effects to FDA at 7-578-UNA-3555.  Where should I keep my medicine?  Keep out of the reach of children.  Store at room temperature between 15 and 30 degrees C (59 and 86 degrees F). Throw away any unused medicine after the expiration date.  NOTE: This sheet is a summary. It may not cover all possible information. If you have questions about this medicine, talk to your doctor, pharmacist, or health care provider.  © 2020 [employer] (2020-03-16 09:45:59)      Heart Failure, Diagnosis    Heart failure is a condition in which the heart has trouble pumping blood because it has become weak or stiff. This means that the heart does not pump blood well enough for the body to stay healthy. For some people with heart failure, fluid may back up into the lungs. There may also be swelling (edema) in the lower legs. Heart failure is usually a long-term (chronic) condition. It is important for you to take good care of yourself and follow the treatment plan from your health care provider.  What are the causes?  This condition may be caused by:  · High blood pressure (hypertension). Hypertension causes the heart muscle to work harder than normal. This makes the heart stiff or weak.  · Coronary artery disease, or CAD. CAD is the buildup of cholesterol and fat (plaque) in the arteries of the heart.  · Heart attack, also called myocardial infarction. This injures the heart muscle, making it hard for the heart to pump blood.  · Abnormal heart valves. The valves do not open and close properly, forcing the heart to pump harder to keep the blood flowing.  · Heart muscle disease (cardiomyopathy or myocarditis). This is damage to the heart muscle. It can increase the risk of heart failure.  · Lung disease. The heart works  harder when the lungs are not healthy.  · Abnormal heart rhythms. These can lead to heart failure.  What increases the risk?  The risk of heart failure increases as a person ages. This condition is also more likely to develop in people who:  · Are overweight.  · Are male.  · Smoke or chew tobacco.  · Abuse alcohol or illegal drugs.  · Have taken medicines that can damage the heart, such as chemotherapy drugs.  · Have diabetes.  · Have abnormal heart rhythms.  · Have thyroid problems.  · Have low blood counts (anemia).  What are the signs or symptoms?  Symptoms of this condition include:  · Shortness of breath with activity, such as when climbing stairs.  · A cough that does not go away.  · Swelling of the feet, ankles, legs, or abdomen.  · Losing weight for no reason.  · Trouble breathing when lying flat (orthopnea).  · Waking from sleep because of the need to sit up and get more air.  · Rapid heartbeat.  · Tiredness (fatigue) and loss of energy.  · Feeling light-headed, dizzy, or close to fainting.  · Loss of appetite.  · Nausea.  · Waking up more often during the night to urinate (nocturia).  · Confusion.  How is this diagnosed?  This condition is diagnosed based on:  · Your medical history, symptoms, and a physical exam.  · Diagnostic tests, which may include:  ? Echocardiogram.  ? Electrocardiogram (ECG).  ? Chest X-ray.  ? Blood tests.  ? Exercise stress test.  ? Radionuclide scans.  ? Cardiac catheterization and angiogram.  How is this treated?  Treatment for this condition is aimed at managing the symptoms of heart failure.  Medicines  Treatment may include medicines that:  · Help lower blood pressure by relaxing (dilating) the blood vessels. These medicines are called ACE inhibitors (angiotensin-converting enzyme) and ARBs (angiotensin receptor blockers).  · Cause the kidneys to remove salt and water from the blood through urination (diuretics).  · Improve heart muscle strength and prevent the heart from  beating too fast (beta blockers).  · Increase the force of the heartbeat (digoxin).  Healthy behavior changes         Treatment may also include making healthy lifestyle changes, such as:  · Reaching and staying at a healthy weight.  · Quitting smoking or chewing tobacco.  · Eating heart-healthy foods.  · Limiting or avoiding alcohol.  · Stopping the use of illegal drugs.  · Being physically active.    Other treatments  Other treatments may include:  · Procedures to open blocked arteries or repair damaged valves.  · Placing a pacemaker to improve heart function (cardiac resynchronization therapy).  · Placing a device to treat serious abnormal heart rhythms (implantable cardioverter defibrillator, or ICD).  · Placing a device to improve the pumping ability of the heart (left ventricular assist device, or LVAD).  · Receiving a healthy heart from a donor (heart transplant). This is done when other treatments have not helped.  Follow these instructions at home:  · Manage other health conditions as told by your health care provider. These may include hypertension, diabetes, thyroid disease, or abnormal heart rhythms.  · Get ongoing education and support as needed. Learn as much as you can about heart failure.  · Keep all follow-up visits as told by your health care provider. This is important.  Summary  · Heart failure is a condition in which the heart has trouble pumping blood because it has become weak or stiff.  · This condition is caused by high blood pressure and other diseases of the heart and lungs.  · Symptoms of this condition include shortness of breath, tiredness (fatigue), nausea, and swelling of the feet, ankles, legs, or abdomen.  · Treatments for this condition may include medicines, lifestyle changes, and surgery.  · Manage other health conditions as told by your health care provider.  This information is not intended to replace advice given to you by your health care provider. Make sure you discuss any  questions you have with your health care provider.  Document Released: 12/18/2006 Document Revised: 03/06/2020 Document Reviewed: 03/06/2020  [employer] Patient Education © 2020 [employer]      Coronary Artery Disease, Male  Coronary artery disease (CAD) is a condition in which the arteries that lead to the heart (coronary arteries) become narrow or blocked. The narrowing or blockage can lead to decreased blood flow to the heart. Prolonged reduced blood flow can cause a heart attack (myocardial infarction or MI). This condition may also be called coronary heart disease.  Because CAD is the leading cause of death in men, it is important to understand what causes this condition and how it is treated.  What are the causes?  CAD is most often caused by atherosclerosis. This is the buildup of fat and cholesterol (plaque) on the inside of the arteries. Over time, the plaque may narrow or block the artery, reducing blood flow to the heart. Plaque can also become weak and break off within a coronary artery and cause a sudden blockage. Other less common causes of CAD include:  · A blood clot or a piece of a blood clot or other substance that blocks the flow of blood in a coronary artery (embolism).  · A tearing of the artery (spontaneous coronary artery dissection).  · An enlargement of an artery (aneurysm).  · Inflammation (vasculitis) in the artery wall.  What increases the risk?  The following factors may make you more likely to develop this condition:  · Age. Men over age 45 are at a greater risk of CAD.  · Family history of CAD.  · Gender. Men often develop CAD earlier in life than women.  · High blood pressure (hypertension).  · Diabetes.  · High cholesterol levels.  · Tobacco use.  · Excessive alcohol use.  · Lack of exercise.  · A diet high in saturated and trans fats, such as fried food and processed meat.  Other possible risk factors include:  · High stress levels.  · Depression.  · Obesity.  · Sleep apnea.  What  are the signs or symptoms?  Many people do not have any symptoms during the early stages of CAD. As the condition progresses, symptoms may include:  · Chest pain (angina). The pain can:  ? Feel like crushing or squeezing, or like a tightness, pressure, fullness, or heaviness in the chest.  ? Last more than a few minutes or can stop and recur. The pain tends to get worse with exercise or stress and to fade with rest.  · Pain in the arms, neck, jaw, ear, or back.  · Unexplained heartburn or indigestion.  · Shortness of breath.  · Nausea or vomiting.  · Sudden light-headedness.  · Sudden cold sweats.  · Fluttering or fast heartbeat (palpitations).  How is this diagnosed?  This condition is diagnosed based on:  · Your family and medical history.  · A physical exam.  · Tests, including:  ? A test to check the electrical signals in your heart (electrocardiogram).  ? Exercise stress test. This looks for signs of blockage when the heart is stressed with exercise, such as running on a treadmill.  ? Pharmacologic stress test. This test looks for signs of blockage when the heart is being stressed with a medicine.  ? Blood tests.  ? Coronary angiogram. This is a procedure to look at the coronary arteries to see if there is any blockage. During this test, a dye is injected into your arteries so they appear on an X-ray.  ? Coronary artery CT scan. This CT scan helps detect calcium deposits in your coronary arteries. Calcium deposits are an indicator of CAD.  ? A test that uses sound waves to take a picture of your heart (echocardiogram).  ? Chest X-ray.  How is this treated?  This condition may be treated by:  · Healthy lifestyle changes to reduce risk factors.  · Medicines such as:  ? Antiplatelet medicines and blood-thinning medicines, such as aspirin. These help to prevent blood clots.  ? Nitroglycerin.  ? Blood pressure medicines.  ? Cholesterol-lowering medicine.  · Coronary angioplasty and stenting. During this procedure, a  thin, flexible tube is inserted through a blood vessel and into a blocked artery. A balloon or similar device on the end of the tube is inflated to open up the artery. In some cases, a small, mesh tube (stent) is inserted into the artery to keep it open.  · Coronary artery bypass surgery. During this surgery, veins or arteries from other parts of the body are used to create a bypass around the blockage and allow blood to reach your heart.  Follow these instructions at home:  Medicines  · Take over-the-counter and prescription medicines only as told by your health care provider.  · Do not take the following medicines unless your health care provider approves:  ? NSAIDs, such as ibuprofen, naproxen, or celecoxib.  ? Vitamin supplements that contain vitamin A, vitamin E, or both.  Lifestyle  · Follow an exercise program approved by your health care provider. Aim for 150 minutes of moderate exercise or 75 minutes of vigorous exercise each week.  · Maintain a healthy weight or lose weight as approved by your health care provider.  · Learn to manage stress or try to limit your stress. Ask your health care provider for suggestions if you need help.  · Get screened for depression and seek treatment, if needed.  · Do not use any products that contain nicotine or tobacco, such as cigarettes, e-cigarettes, and chewing tobacco. If you need help quitting, ask your health care provider.  · Do not use illegal drugs.  Eating and drinking    · Follow a heart-healthy diet. A dietitian can help educate you about healthy food options and changes. In general, eat plenty of fruits and vegetables, lean meats, and whole grains.  · Avoid foods high in:  ? Sugar.  ? Salt (sodium).  ? Saturated fat, such as processed or fatty meat.  ? Trans fat, such as fried foods.  · Use healthy cooking methods such as roasting, grilling, broiling, baking, poaching, steaming, or stir-frying.  · Do not drink alcohol if your health care provider tells you not  to drink.  · If you drink alcohol:  ? Limit how much you have to 0-2 drinks per day.  ? Be aware of how much alcohol is in your drink. In the [place] one drink equals one 12 oz bottle of beer (355 mL), one 5 oz glass of wine (148 mL), or one 1½ oz glass of hard liquor (44 mL).  General instructions  · Manage any other health conditions, such as hypertension and diabetes. These conditions affect your heart.  · Your health care provider may ask you to monitor your blood pressure. Ideally, your blood pressure should be below 130/80.  · Keep all follow-up visits as told by your health care provider. This is important.  Get help right away if:  · You have pain in your chest, neck, ear, arm, jaw, stomach, or back that:  ? Lasts more than a few minutes.  ? Is recurring.  ? Is not relieved by taking medicine under your tongue (sublingual nitroglycerin).  · You have profuse sweating without cause.  · You have unexplained:  ? Heartburn or indigestion.  ? Shortness of breath or difficulty breathing.  ? Fluttering or fast heartbeat (palpitations).  ? Nausea or vomiting.  ? Fatigue.  ? Feelings of nervousness or anxiety.  ? Weakness.  ? Diarrhea.  · You have sudden light-headedness or dizziness.  · You faint.  · You feel like hurting yourself or think about taking your own life.  These symptoms may represent a serious problem that is an emergency. Do not wait to see if the symptoms will go away. Get medical help right away. Call your local emergency services (911 in the [place] Do not drive yourself to the hospital.  Summary  · Coronary artery disease (CAD) is a condition in which the arteries that lead to the heart (coronary arteries) become narrow or blocked. The narrowing or blockage can lead to a heart attack.  · Many people do not have any symptoms during the early stages of CAD.  · CAD can be treated with lifestyle changes, medicines, surgery, or a combination of these treatments.  This information is not intended to replace  advice given to you by your health care provider. Make sure you discuss any questions you have with your health care provider.  Document Released: 07/15/2015 Document Revised: 09/06/2019 Document Reviewed: 08/27/2019  Elsevier Patient Education © 2020 [employer]

## 2021-01-05 ENCOUNTER — HOSPITAL ENCOUNTER (OUTPATIENT)
Dept: LAB | Facility: MEDICAL CENTER | Age: 56
End: 2021-01-05
Attending: INTERNAL MEDICINE
Payer: COMMERCIAL

## 2021-01-05 DIAGNOSIS — I25.5 ISCHEMIC CARDIOMYOPATHY: ICD-10-CM

## 2021-01-05 DIAGNOSIS — E11.65 UNCONTROLLED TYPE 2 DIABETES MELLITUS WITH HYPERGLYCEMIA (HCC): ICD-10-CM

## 2021-01-05 LAB
ALBUMIN SERPL BCP-MCNC: 4 G/DL (ref 3.2–4.9)
ALBUMIN/GLOB SERPL: 1.2 G/DL
ALP SERPL-CCNC: 78 U/L (ref 30–99)
ALT SERPL-CCNC: 22 U/L (ref 2–50)
ANION GAP SERPL CALC-SCNC: 7 MMOL/L (ref 7–16)
AST SERPL-CCNC: 19 U/L (ref 12–45)
BILIRUB SERPL-MCNC: 0.3 MG/DL (ref 0.1–1.5)
BUN SERPL-MCNC: 9 MG/DL (ref 8–22)
CALCIUM SERPL-MCNC: 9.6 MG/DL (ref 8.5–10.5)
CHLORIDE SERPL-SCNC: 106 MMOL/L (ref 96–112)
CO2 SERPL-SCNC: 30 MMOL/L (ref 20–33)
CREAT SERPL-MCNC: 0.81 MG/DL (ref 0.5–1.4)
EST. AVERAGE GLUCOSE BLD GHB EST-MCNC: 163 MG/DL
GLOBULIN SER CALC-MCNC: 3.4 G/DL (ref 1.9–3.5)
GLUCOSE SERPL-MCNC: 158 MG/DL (ref 65–99)
HBA1C MFR BLD: 7.3 % (ref 0–5.6)
NT-PROBNP SERPL IA-MCNC: 141 PG/ML (ref 0–125)
POTASSIUM SERPL-SCNC: 4.8 MMOL/L (ref 3.6–5.5)
PROT SERPL-MCNC: 7.4 G/DL (ref 6–8.2)
SODIUM SERPL-SCNC: 143 MMOL/L (ref 135–145)

## 2021-01-05 PROCEDURE — 83036 HEMOGLOBIN GLYCOSYLATED A1C: CPT

## 2021-01-05 PROCEDURE — 80053 COMPREHEN METABOLIC PANEL: CPT

## 2021-01-05 PROCEDURE — 36415 COLL VENOUS BLD VENIPUNCTURE: CPT

## 2021-01-05 PROCEDURE — 83880 ASSAY OF NATRIURETIC PEPTIDE: CPT

## 2021-01-06 ENCOUNTER — TELEPHONE (OUTPATIENT)
Dept: CARDIOLOGY | Facility: MEDICAL CENTER | Age: 56
End: 2021-01-06

## 2021-01-07 NOTE — TELEPHONE ENCOUNTER
VR    NM: Israel Aviles   PH: [phone]   PT NM: [email or web address]   : [birth year]   REG DR: Dr Segovia   RE: Has questions regarding  medications   DISP HIST: 2021 03:56P AF    --------------------------------------  Message History  Account: [id number]  Taken:  2021  3:56p AF  Serial#: [id number]      Thank you,  Nichole ALFONSO

## 2021-01-11 ENCOUNTER — HOSPITAL ENCOUNTER (OUTPATIENT)
Dept: CARDIOLOGY | Facility: MEDICAL CENTER | Age: 56
End: 2021-01-11
Attending: INTERNAL MEDICINE
Payer: COMMERCIAL

## 2021-01-11 DIAGNOSIS — I25.5 ISCHEMIC CARDIOMYOPATHY: ICD-10-CM

## 2021-01-11 DIAGNOSIS — E11.65 UNCONTROLLED TYPE 2 DIABETES MELLITUS WITH HYPERGLYCEMIA (HCC): ICD-10-CM

## 2021-01-11 LAB
LV EJECT FRACT  99904: 55
LV EJECT FRACT MOD 2C 99903: 48.91
LV EJECT FRACT MOD 4C 99902: 45.46
LV EJECT FRACT MOD BP 99901: 46.43

## 2021-01-11 PROCEDURE — 93306 TTE W/DOPPLER COMPLETE: CPT

## 2021-01-11 PROCEDURE — 93306 TTE W/DOPPLER COMPLETE: CPT | Mod: 26 | Performed by: INTERNAL MEDICINE

## 2021-01-20 ENCOUNTER — OFFICE VISIT (OUTPATIENT)
Dept: CARDIOLOGY | Facility: MEDICAL CENTER | Age: 56
End: 2021-01-20
Payer: COMMERCIAL

## 2021-01-20 VITALS
SYSTOLIC BLOOD PRESSURE: 118 MMHG | OXYGEN SATURATION: 99 % | BODY MASS INDEX: 23.16 KG/M2 | HEIGHT: 72 IN | HEART RATE: 83 BPM | WEIGHT: 171 LBS | RESPIRATION RATE: 16 BRPM | DIASTOLIC BLOOD PRESSURE: 80 MMHG

## 2021-01-20 DIAGNOSIS — I73.9 PVD (PERIPHERAL VASCULAR DISEASE) (HCC): ICD-10-CM

## 2021-01-20 DIAGNOSIS — I25.10 CORONARY ARTERY DISEASE INVOLVING NATIVE CORONARY ARTERY OF NATIVE HEART, ANGINA PRESENCE UNSPECIFIED: ICD-10-CM

## 2021-01-20 DIAGNOSIS — E11.65 UNCONTROLLED TYPE 2 DIABETES MELLITUS WITH HYPERGLYCEMIA (HCC): ICD-10-CM

## 2021-01-20 DIAGNOSIS — E11.42 DIABETIC POLYNEUROPATHY ASSOCIATED WITH TYPE 2 DIABETES MELLITUS (HCC): ICD-10-CM

## 2021-01-20 DIAGNOSIS — I50.20 HFREF (HEART FAILURE WITH REDUCED EJECTION FRACTION) (HCC): ICD-10-CM

## 2021-01-20 DIAGNOSIS — I25.5 ISCHEMIC CARDIOMYOPATHY: ICD-10-CM

## 2021-01-20 DIAGNOSIS — E78.2 MIXED HYPERLIPIDEMIA: ICD-10-CM

## 2021-01-20 PROBLEM — E11.69 TYPE 2 DIABETES MELLITUS WITH OTHER SPECIFIED COMPLICATION (HCC): Status: ACTIVE | Noted: 2020-06-18

## 2021-01-20 PROCEDURE — 99214 OFFICE O/P EST MOD 30 MIN: CPT | Performed by: INTERNAL MEDICINE

## 2021-01-20 RX ORDER — GLIPIZIDE 5 MG/1
TABLET ORAL
COMMUNITY
Start: 2020-12-20 | End: 2021-07-05

## 2021-01-20 ASSESSMENT — ENCOUNTER SYMPTOMS
BLURRED VISION: 0
ORTHOPNEA: 0
PND: 0
COUGH: 0
DIZZINESS: 0
BACK PAIN: 0
FEVER: 0
WEIGHT LOSS: 0
CLAUDICATION: 0
VOMITING: 0
HEARTBURN: 0
NAUSEA: 0
WEIGHT GAIN: 0
DIARRHEA: 0
DECREASED APPETITE: 0
ALTERED MENTAL STATUS: 0
NEAR-SYNCOPE: 0
IRREGULAR HEARTBEAT: 0
DYSPNEA ON EXERTION: 0
FLANK PAIN: 0
CONSTIPATION: 0
DEPRESSION: 0
ABDOMINAL PAIN: 0
SYNCOPE: 0
SHORTNESS OF BREATH: 0
PALPITATIONS: 0

## 2021-01-20 ASSESSMENT — FIBROSIS 4 INDEX: FIB4 SCORE: 1.47

## 2021-01-20 NOTE — PROGRESS NOTES
Cardiology Note    Heart failure    History of Present Illness: Israel Aviles is a 55 year old man PMH DM2, HTN, PAD s/p angioplasty c/b osteomyelitis s/p LLE BKA 12/20/19; incidentally found new HF 30-35% now recovered and obstructive multivessel CAD presents for follow up.    No cardiac complaints this visit. Was unable to tolerate metoprolol or jardiance due to shaking chills. Sounds like he started metoprolol first and  by two days. Compliant with remaining medications and denies adverse effects. Follows Dr Mcnally for DM control.     Review of Systems   Constitution: Negative for decreased appetite, fever, malaise/fatigue, weight gain and weight loss.   HENT: Negative for congestion and nosebleeds.    Eyes: Negative for blurred vision.   Cardiovascular: Negative for chest pain, claudication, dyspnea on exertion, irregular heartbeat, leg swelling, near-syncope, orthopnea, palpitations, paroxysmal nocturnal dyspnea and syncope.   Respiratory: Negative for cough and shortness of breath.    Endocrine: Negative for cold intolerance and heat intolerance.   Skin: Negative for rash.   Musculoskeletal: Negative for back pain.   Gastrointestinal: Negative for abdominal pain, constipation, diarrhea, heartburn, melena, nausea and vomiting.   Genitourinary: Negative for dysuria, flank pain and hematuria.   Neurological: Negative for dizziness.   Psychiatric/Behavioral: Negative for altered mental status and depression.         Past Medical History:   Diagnosis Date   • CAD (coronary artery disease)    • Diabetes (HCC)    • Hyperlipidemia    • Hypertension          Past Surgical History:   Procedure Laterality Date   • KNEE AMPUTATION BELOW Left 12/20/2019    Procedure: AMPUTATION, BELOW KNEE;  Surgeon: Koby Zhao M.D.;  Location: SURGERY [facility];  Service: Orthopedics   • ZZZ CARDIAC CATH  12/16/2019    multi-vessel disease   • IRRIGATION & DEBRIDEMENT ORTHO Left 6/24/2019    Procedure:  IRRIGATION AND DEBRIDEMENT, WOUND-FOOT, BIOLOGIC PLACEMENT, WOUND VAC PLACEMENT;  Surgeon: Charles Chopra M.D.;  Location: SURGERY [facility];  Service: Orthopedics         Current Outpatient Medications   Medication Sig Dispense Refill   • glipiZIDE (GLUCOTROL) 5 MG Tab TAKE 1 TABLET BY MOUTH TWICE DAILY FOR DIABETES     • rivaroxaban (XARELTO) 2.5 MG tablet Take 1 Tab by mouth 2 times a day. 60 Tab 3   • ezetimibe (ZETIA) 10 MG Tab Take 1 Tab by mouth every day. (Patient taking differently: Take 10 mg by mouth every day. Takes 1/2 tablet every other day) 90 Tab 3   • atorvastatin (LIPITOR) 80 MG tablet Take 1 Tab by mouth every day. (Patient taking differently: Take 40 mg by mouth every day.) 90 Tab 3   • aspirin 81 MG EC tablet Take 1 Tab by mouth every day. 30 Tab 3   • metformin (GLUCOPHAGE) 1000 MG tablet Take 1 Tab by mouth 2 times a day, with meals. (Patient taking differently: Take 500 mg by mouth 2 times a day, with meals.) 60 Tab 2   • metoprolol SR (TOPROL XL) 25 MG TABLET SR 24 HR Take 0.5 Tabs by mouth every evening. (Patient not taking: Reported on 1/20/2021) 90 Tab 0   • Empagliflozin (JARDIANCE) 10 MG Tab Take 1 Tab by mouth every day. (Patient not taking: Reported on 1/20/2021) 90 Tab 3     No current facility-administered medications for this visit.          No Known Allergies      Family History   Problem Relation Age of Onset   • Diabetes Mother    • Diabetes Father          Social History     Socioeconomic History   • Marital status:      Spouse name: Not on file   • Number of children: Not on file   • Years of education: Not on file   • Highest education level: Not on file   Occupational History   • Not on file   Social Needs   • Financial resource strain: Not on file   • Food insecurity     Worry: Not on file     Inability: Not on file   • Transportation needs     Medical: Not on file     Non-medical: Not on file   Tobacco Use   • Smoking status: Never Smoker   • Smokeless  tobacco: Never Used   Substance and Sexual Activity   • Alcohol use: No   • Drug use: No   • Sexual activity: Not on file   Lifestyle   • Physical activity     Days per week: Not on file     Minutes per session: Not on file   • Stress: Not on file   Relationships   • Social connections     Talks on phone: Not on file     Gets together: Not on file     Attends Mandaeism service: Not on file     Active member of club or organization: Not on file     Attends meetings of clubs or organizations: Not on file     Relationship status: Not on file   • Intimate partner violence     Fear of current or ex partner: Not on file     Emotionally abused: Not on file     Physically abused: Not on file     Forced sexual activity: Not on file   Other Topics Concern   • Not on file   Social History Narrative   • Not on file         Physical Exam:  Ambulatory Vitals  /80 (BP Location: Left arm, Patient Position: Sitting, BP Cuff Size: Adult)   Pulse 83   Resp 16   Ht 1.829 m (6')   Wt 77.6 kg (171 lb)   SpO2 99%    BP Readings from Last 4 Encounters:   01/20/21 118/80   12/08/20 119/54   08/27/20 113/67   07/10/20 (!) 85/64     Weight/BMI:   Vitals:    01/20/21 0923   BP: 118/80   Weight: 77.6 kg (171 lb)   Height: 1.829 m (6')    Body mass index is 23.19 kg/m².  Wt Readings from Last 4 Encounters:   01/20/21 77.6 kg (171 lb)   12/08/20 79.8 kg (176 lb)   08/27/20 75.3 kg (166 lb)   05/28/20 79.8 kg (176 lb)       Physical Exam   Constitutional: He is oriented to person, place, and time and well-developed, well-nourished, and in no distress. No distress.   HENT:   Head: Normocephalic and atraumatic.   Eyes: Pupils are equal, round, and reactive to light. Conjunctivae are normal.   Neck: Normal range of motion. Neck supple. No JVD present.   Cardiovascular: Normal rate, regular rhythm, normal heart sounds and intact distal pulses. Exam reveals no gallop and no friction rub.   No murmur heard.  Pulmonary/Chest: Effort normal and  breath sounds normal. No respiratory distress. He has no wheezes. He has no rales. He exhibits no tenderness.   Abdominal: Soft. Bowel sounds are normal. He exhibits no distension.   Musculoskeletal:         General: No edema.   Neurological: He is alert and oriented to person, place, and time.   Skin: Skin is warm and dry.   Psychiatric: Affect and judgment normal.       Lab Data Review:  Lab Results   Component Value Date/Time    CHOLSTRLTOT 262 (H) 06/15/2018 10:20 PM     (H) 06/15/2018 10:20 PM    HDL 54 06/15/2018 10:20 PM    TRIGLYCERIDE 132 06/15/2018 10:20 PM       Lab Results   Component Value Date/Time    SODIUM 143 01/05/2021 07:20 AM    POTASSIUM 4.8 01/05/2021 07:20 AM    CHLORIDE 106 01/05/2021 07:20 AM    CO2 30 01/05/2021 07:20 AM    GLUCOSE 158 (H) 01/05/2021 07:20 AM    BUN 9 01/05/2021 07:20 AM    CREATININE 0.81 01/05/2021 07:20 AM    CREATININE 0.9 05/09/2007 01:20 AM     CrCl cannot be calculated (Patient's most recent lab result is older than the maximum 7 days allowed.).  Lab Results   Component Value Date/Time    ALKPHOSPHAT 78 01/05/2021 07:20 AM    ASTSGOT 19 01/05/2021 07:20 AM    ALTSGPT 22 01/05/2021 07:20 AM    TBILIRUBIN 0.3 01/05/2021 07:20 AM      Lab Results   Component Value Date/Time    WBC 4.5 (L) 03/18/2020 10:50 AM     Lab Results   Component Value Date/Time    HBA1C 7.3 (H) 01/05/2021 07:20 AM     No components found for: TROP    Outside a1c 6.3 noted by Dr Mcnally 10/16/20    Cardiac Imaging and Procedures Review:      TTE 1/11/2021  CONCLUSIONS  Normal left ventricular size, wall thickness, and systolic function.  Normal right ventricular size and systolic function.  No significant valvular pathology.  Normal pericardium without effusion.     Compared to the images of the prior study done 12/18/19, left   ventricular systolic function has recovered.    Cardiac MRI 7/6/20  1. Diffuse subendocardial late gadolinium enhancement involving the apex, apical septal,  apical inferior, apical lateral, apical anterior, in keeping with prior infarct.     The apex is thinning with more than 75% enhancement, likely transmural infarct with no viability.     The other apical septal, apical inferior, apical lateral, apical anterior segments have 40-60% late gadolinium enhancement of the myocardial thickness.     2. Akinesis of the apical septum and apical segments. Hypokinesis of the apical anterior segment. Grossly preserved contraction in the apical lateral and apical inferior segments.     Ejection fraction of the left ventricle is 46 %, similar to prior.     3. No thrombus in the left ventricular apex.    TTE 12/18/2019  CONCLUSIONS  Limited Exam for LV Apical Thrombus.   No thrombus. Mildly reduced left ventricular systolic function.  Left ventricular ejection fraction is visually estimated to be 45%.  There is apical septum and apical akinesis.    TTE 12/16/19  CONCLUSIONS  Severely reduced left ventricular systolic function. Left ventricular   ejection fraction is visually estimated to be 30-35%.   There appears to be a linear echodensity which is adjacent to the LV   apex concerning for possible small LV thrombus. May consider repeat   limted echo in 1-2 days for re-evaluation if clinically indicated.    Normal inferior vena cava size and inspiratory collapse.  Indeterminate diastolic function.  Aortic sclerosis without stenosis.     [facility] 12/2019  Coronary Diagnostic Findings    * Left main: Distal tapering with 50% stenosis.    * Left anterior descending: Diffuse atherosclerosis and negative remodeling  beginning at the origin.  There are sequential 70% stenosis in the proximal  and mid segment of the vessel.  There is subtotal occlusion in the mid to  distal segment with AGATHA II flow into the apical LAD. The first diagonal has  proximal 70% stenosis and is diffusely diseased and negatively remodeled with  95% stenosis in the lower branch.    * Left circumflex: Mid vessel 70-80%  stenosis.  The OM1 has diffuse  atherosclerosis there is less than 1.5 mm in size and without significant  narrowing.  The OM 2 bifurcates proximally the upper branch has proximal 50%  stenosis and is a less than 1.5 mm vessel.  The lower branch is a 2.25 mm  vessel and has proximal 85% stenosis.    * Right coronary artery: Dominant, diffuse 60% stenosis in the proximal mid  and distal AV groove.  There is a dual PDA.  The first PDA has a mid 80%  stenosis and diffuse disease downstream.  The second PDA has proximal 80%  stenosis.  Conclusions    1. Severe LV systolic dysfunction by noninvasive evaluation.    2. Severe and diffuse obstructive three-vessel coronary artery disease.    3. Normal LVEDP.    Medical Decision Making:  Problem List Items Addressed This Visit     Uncontrolled type 2 diabetes mellitus with hyperglycemia ([facility])    Relevant Medications    glipiZIDE (GLUCOTROL) 5 MG Tab    Hyperlipidemia    Diabetic polyneuropathy associated with type 2 diabetes mellitus ([facility])    Relevant Medications    glipiZIDE (GLUCOTROL) 5 MG Tab    CAD (coronary artery disease)    Relevant Orders    LIPID PANEL    HFrEF (heart failure with reduced ejection fraction) ([facility])    Ischemic cardiomyopathy    PVD (peripheral vascular disease) ([facility])        ICM, HFrecEF, NYHA I, stage C - recovered systolic funciton. Reattempt metoprolol. Cannot tolerate Ace/arb due to hypotension. Continue aspirin and xarelto 2.5mg bid. Now that cardiac function has recovered can medically manage coronary disease. Should symptoms worsen or systolic function decrease can reconsider.     Severe CAD/PAD/HLD - continue aspirin 81mg and xarelto 2.5mg bid. Continue statin. Annual lipids. Goal LDL <70.     Dm2 - messaged PMD to work on reinstituting SGLT2i. May have too high burden of antiglycemics.     It was my pleasure to meet with Mr. Aviles.

## 2021-01-20 NOTE — PATIENT INSTRUCTIONS
Coronary Artery Disease, Male  Coronary artery disease (CAD) is a condition in which the arteries that lead to the heart (coronary arteries) become narrow or blocked. The narrowing or blockage can lead to decreased blood flow to the heart. Prolonged reduced blood flow can cause a heart attack (myocardial infarction or MI). This condition may also be called coronary heart disease.  Because CAD is the leading cause of death in men, it is important to understand what causes this condition and how it is treated.  What are the causes?  CAD is most often caused by atherosclerosis. This is the buildup of fat and cholesterol (plaque) on the inside of the arteries. Over time, the plaque may narrow or block the artery, reducing blood flow to the heart. Plaque can also become weak and break off within a coronary artery and cause a sudden blockage. Other less common causes of CAD include:  · A blood clot or a piece of a blood clot or other substance that blocks the flow of blood in a coronary artery (embolism).  · A tearing of the artery (spontaneous coronary artery dissection).  · An enlargement of an artery (aneurysm).  · Inflammation (vasculitis) in the artery wall.  What increases the risk?  The following factors may make you more likely to develop this condition:  · Age. Men over age 45 are at a greater risk of CAD.  · Family history of CAD.  · Gender. Men often develop CAD earlier in life than women.  · High blood pressure (hypertension).  · Diabetes.  · High cholesterol levels.  · Tobacco use.  · Excessive alcohol use.  · Lack of exercise.  · A diet high in saturated and trans fats, such as fried food and processed meat.  Other possible risk factors include:  · High stress levels.  · Depression.  · Obesity.  · Sleep apnea.  What are the signs or symptoms?  Many people do not have any symptoms during the early stages of CAD. As the condition progresses, symptoms may include:  · Chest pain (angina). The pain can:  ? Feel  like crushing or squeezing, or like a tightness, pressure, fullness, or heaviness in the chest.  ? Last more than a few minutes or can stop and recur. The pain tends to get worse with exercise or stress and to fade with rest.  · Pain in the arms, neck, jaw, ear, or back.  · Unexplained heartburn or indigestion.  · Shortness of breath.  · Nausea or vomiting.  · Sudden light-headedness.  · Sudden cold sweats.  · Fluttering or fast heartbeat (palpitations).  How is this diagnosed?  This condition is diagnosed based on:  · Your family and medical history.  · A physical exam.  · Tests, including:  ? A test to check the electrical signals in your heart (electrocardiogram).  ? Exercise stress test. This looks for signs of blockage when the heart is stressed with exercise, such as running on a treadmill.  ? Pharmacologic stress test. This test looks for signs of blockage when the heart is being stressed with a medicine.  ? Blood tests.  ? Coronary angiogram. This is a procedure to look at the coronary arteries to see if there is any blockage. During this test, a dye is injected into your arteries so they appear on an X-ray.  ? Coronary artery CT scan. This CT scan helps detect calcium deposits in your coronary arteries. Calcium deposits are an indicator of CAD.  ? A test that uses sound waves to take a picture of your heart (echocardiogram).  ? Chest X-ray.  How is this treated?  This condition may be treated by:  · Healthy lifestyle changes to reduce risk factors.  · Medicines such as:  ? Antiplatelet medicines and blood-thinning medicines, such as aspirin. These help to prevent blood clots.  ? Nitroglycerin.  ? Blood pressure medicines.  ? Cholesterol-lowering medicine.  · Coronary angioplasty and stenting. During this procedure, a thin, flexible tube is inserted through a blood vessel and into a blocked artery. A balloon or similar device on the end of the tube is inflated to open up the artery. In some cases, a small,  mesh tube (stent) is inserted into the artery to keep it open.  · Coronary artery bypass surgery. During this surgery, veins or arteries from other parts of the body are used to create a bypass around the blockage and allow blood to reach your heart.  Follow these instructions at home:  Medicines  · Take over-the-counter and prescription medicines only as told by your health care provider.  · Do not take the following medicines unless your health care provider approves:  ? NSAIDs, such as ibuprofen, naproxen, or celecoxib.  ? Vitamin supplements that contain vitamin A, vitamin E, or both.  Lifestyle  · Follow an exercise program approved by your health care provider. Aim for 150 minutes of moderate exercise or 75 minutes of vigorous exercise each week.  · Maintain a healthy weight or lose weight as approved by your health care provider.  · Learn to manage stress or try to limit your stress. Ask your health care provider for suggestions if you need help.  · Get screened for depression and seek treatment, if needed.  · Do not use any products that contain nicotine or tobacco, such as cigarettes, e-cigarettes, and chewing tobacco. If you need help quitting, ask your health care provider.  · Do not use illegal drugs.  Eating and drinking    · Follow a heart-healthy diet. A dietitian can help educate you about healthy food options and changes. In general, eat plenty of fruits and vegetables, lean meats, and whole grains.  · Avoid foods high in:  ? Sugar.  ? Salt (sodium).  ? Saturated fat, such as processed or fatty meat.  ? Trans fat, such as fried foods.  · Use healthy cooking methods such as roasting, grilling, broiling, baking, poaching, steaming, or stir-frying.  · Do not drink alcohol if your health care provider tells you not to drink.  · If you drink alcohol:  ? Limit how much you have to 0-2 drinks per day.  ? Be aware of how much alcohol is in your drink. In the [place] one drink equals one 12 oz bottle of beer  (355 mL), one 5 oz glass of wine (148 mL), or one 1½ oz glass of hard liquor (44 mL).  General instructions  · Manage any other health conditions, such as hypertension and diabetes. These conditions affect your heart.  · Your health care provider may ask you to monitor your blood pressure. Ideally, your blood pressure should be below 130/80.  · Keep all follow-up visits as told by your health care provider. This is important.  Get help right away if:  · You have pain in your chest, neck, ear, arm, jaw, stomach, or back that:  ? Lasts more than a few minutes.  ? Is recurring.  ? Is not relieved by taking medicine under your tongue (sublingual nitroglycerin).  · You have profuse sweating without cause.  · You have unexplained:  ? Heartburn or indigestion.  ? Shortness of breath or difficulty breathing.  ? Fluttering or fast heartbeat (palpitations).  ? Nausea or vomiting.  ? Fatigue.  ? Feelings of nervousness or anxiety.  ? Weakness.  ? Diarrhea.  · You have sudden light-headedness or dizziness.  · You faint.  · You feel like hurting yourself or think about taking your own life.  These symptoms may represent a serious problem that is an emergency. Do not wait to see if the symptoms will go away. Get medical help right away. Call your local emergency services (911 in the [place] Do not drive yourself to the hospital.  Summary  · Coronary artery disease (CAD) is a condition in which the arteries that lead to the heart (coronary arteries) become narrow or blocked. The narrowing or blockage can lead to a heart attack.  · Many people do not have any symptoms during the early stages of CAD.  · CAD can be treated with lifestyle changes, medicines, surgery, or a combination of these treatments.  This information is not intended to replace advice given to you by your health care provider. Make sure you discuss any questions you have with your health care provider.  Document Released: 07/15/2015 Document Revised: 09/06/2019  Document Reviewed: 08/27/2019  [employer] Patient Education © 2020 [employer]      Metoprolol extended-release tablets  What is this medicine?  METOPROLOL (me TOE proe lole) is a beta-blocker. Beta-blockers reduce the workload on the heart and help it to beat more regularly. This medicine is used to treat high blood pressure and to prevent chest pain. It is also used to after a heart attack and to prevent an additional heart attack from occurring.  This medicine may be used for other purposes; ask your health care provider or pharmacist if you have questions.  COMMON BRAND NAME(S): toprol, Toprol XL  What should I tell my health care provider before I take this medicine?  They need to know if you have any of these conditions:  · diabetes  · heart or vessel disease like slow heart rate, worsening heart failure, heart block, sick sinus syndrome or Raynaud's disease  · kidney disease  · liver disease  · lung or breathing disease, like asthma or emphysema  · pheochromocytoma  · thyroid disease  · an unusual or allergic reaction to metoprolol, other beta-blockers, medicines, foods, dyes, or preservatives  · pregnant or trying to get pregnant  · breast-feeding  How should I use this medicine?  Take this medicine by mouth with a glass of water. Follow the directions on the prescription label. Do not crush or chew. Take this medicine with or immediately after meals. Take your doses at regular intervals. Do not take more medicine than directed. Do not stop taking this medicine suddenly. This could lead to serious heart-related effects.  Talk to your pediatrician regarding the use of this medicine in children. While this drug may be prescribed for children as young as 6 years for selected conditions, precautions do apply.  Overdosage: If you think you have taken too much of this medicine contact a poison control center or emergency room at once.  NOTE: This medicine is only for you. Do not share this medicine with  others.  What if I miss a dose?  If you miss a dose, take it as soon as you can. If it is almost time for your next dose, take only that dose. Do not take double or extra doses.  What may interact with this medicine?  This medicine may interact with the following medications:  · certain medicines for blood pressure, heart disease, irregular heart beat  · certain medicines for depression, like monoamine oxidase (MAO) inhibitors, fluoxetine, or paroxetine  · clonidine  · dobutamine  · epinephrine  · isoproterenol  · reserpine  This list may not describe all possible interactions. Give your health care provider a list of all the medicines, herbs, non-prescription drugs, or dietary supplements you use. Also tell them if you smoke, drink alcohol, or use illegal drugs. Some items may interact with your medicine.  What should I watch for while using this medicine?  Visit your doctor or health care professional for regular check ups. Contact your doctor right away if your symptoms worsen. Check your blood pressure and pulse rate regularly. Ask your health care professional what your blood pressure and pulse rate should be, and when you should contact them.  You may get drowsy or dizzy. Do not drive, use machinery, or do anything that needs mental alertness until you know how this medicine affects you. Do not sit or stand up quickly, especially if you are an older patient. This reduces the risk of dizzy or fainting spells. Contact your doctor if these symptoms continue. Alcohol may interfere with the effect of this medicine. Avoid alcoholic drinks.  This medicine may increase blood sugar. Ask your healthcare provider if changes in diet or medicines are needed if you have diabetes.  What side effects may I notice from receiving this medicine?  Side effects that you should report to your doctor or health care professional as soon as possible:  · allergic reactions like skin rash, itching or hives  · cold or numb hands or  feet  · depression  · difficulty breathing  · faint  · fever with sore throat  · irregular heartbeat, chest pain  · rapid weight gain  ·   signs and symptoms of high blood sugar such as being more thirsty or hungry or having to urinate more than normal. You may also feel very tired or have blurry vision.  · swollen legs or ankles  Side effects that usually do not require medical attention (report to your doctor or health care professional if they continue or are bothersome):  · anxiety or nervousness  · change in sex drive or performance  · dry skin  · headache  · nightmares or trouble sleeping  · short term memory loss  · stomach upset or diarrhea  This list may not describe all possible side effects. Call your doctor for medical advice about side effects. You may report side effects to FDA at 8-735-IHL-9673.  Where should I keep my medicine?  Keep out of the reach of children.  Store at room temperature between 15 and 30 degrees C (59 and 86 degrees F). Throw away any unused medicine after the expiration date.  NOTE: This sheet is a summary. It may not cover all possible information. If you have questions about this medicine, talk to your doctor, pharmacist, or health care provider.  © 2020 [employer] (2019-10-08 11:09:41)    Ezetimibe Tablets  What is this medicine?  EZETIMIBE (ez ET i mibe) blocks the absorption of cholesterol from the stomach. It can help lower blood cholesterol for patients who are at risk of getting heart disease or a stroke. It is only for patients whose cholesterol level is not controlled by diet.  This medicine may be used for other purposes; ask your health care provider or pharmacist if you have questions.  COMMON BRAND NAME(S): Zetia  What should I tell my health care provider before I take this medicine?  They need to know if you have any of these conditions:  · liver disease  · an unusual or allergic reaction to ezetimibe, medicines, foods, dyes, or  preservatives  · pregnant or trying to get pregnant  · breast-feeding  How should I use this medicine?  Take this medicine by mouth with a glass of water. Follow the directions on the prescription label. This medicine can be taken with or without food. Take your doses at regular intervals. Do not take your medicine more often than directed.  Talk to your pediatrician regarding the use of this medicine in children. Special care may be needed.  Overdosage: If you think you have taken too much of this medicine contact a poison control center or emergency room at once.  NOTE: This medicine is only for you. Do not share this medicine with others.  What if I miss a dose?  If you miss a dose, take it as soon as you can. If it is almost time for your next dose, take only that dose. Do not take double or extra doses.  What may interact with this medicine?  Do not take this medicine with any of the following medications:  · fenofibrate  · gemfibrozil  This medicine may also interact with the following medications:  · antacids  · cyclosporine  · herbal medicines like red yeast rice  · other medicines to lower cholesterol or triglycerides  This list may not describe all possible interactions. Give your health care provider a list of all the medicines, herbs, non-prescription drugs, or dietary supplements you use. Also tell them if you smoke, drink alcohol, or use illegal drugs. Some items may interact with your medicine.  What should I watch for while using this medicine?  Visit your doctor or health care professional for regular checks on your progress. You will need to have your cholesterol levels checked. If you are also taking some other cholesterol medicines, you will also need to have tests to make sure your liver is working properly.  Tell your doctor or health care professional if you get any unexplained muscle pain, tenderness, or weakness, especially if you also have a fever and tiredness.  You need to follow a  low-cholesterol, low-fat diet while you are taking this medicine. This will decrease your risk of getting heart and blood vessel disease. Exercising and avoiding alcohol and smoking can also help. Ask your doctor or dietician for advice.  What side effects may I notice from receiving this medicine?  Side effects that you should report to your doctor or health care professional as soon as possible:  · allergic reactions like skin rash, itching or hives, swelling of the face, lips, or tongue  · dark yellow or brown urine  · unusually weak or tired  · yellowing of the skin or eyes  Side effects that usually do not require medical attention (report to your doctor or health care professional if they continue or are bothersome):  · diarrhea  · dizziness  · headache  · stomach upset or pain  This list may not describe all possible side effects. Call your doctor for medical advice about side effects. You may report side effects to FDA at 5-862-FDA-6947.  Where should I keep my medicine?  Keep out of the reach of children.  Store at room temperature between 15 and 30 degrees C (59 and 86 degrees F). Protect from moisture. Keep container tightly closed. Throw away any unused medicine after the expiration date.  NOTE: This sheet is a summary. It may not cover all possible information. If you have questions about this medicine, talk to your doctor, pharmacist, or health care provider.  © 2020 [employer] (2013-06-24 15:39:09)

## 2021-03-15 DIAGNOSIS — Z23 NEED FOR VACCINATION: ICD-10-CM

## 2021-04-06 ENCOUNTER — IMMUNIZATION (OUTPATIENT)
Dept: FAMILY PLANNING/WOMEN'S HEALTH CLINIC | Facility: IMMUNIZATION CENTER | Age: 56
End: 2021-04-06
Attending: INTERNAL MEDICINE
Payer: COMMERCIAL

## 2021-04-06 DIAGNOSIS — Z23 ENCOUNTER FOR VACCINATION: Primary | ICD-10-CM

## 2021-04-06 DIAGNOSIS — Z23 NEED FOR VACCINATION: ICD-10-CM

## 2021-04-07 PROCEDURE — 91300 PFIZER SARS-COV-2 VACCINE: CPT | Performed by: INTERNAL MEDICINE

## 2021-04-07 PROCEDURE — 0001A PFIZER SARS-COV-2 VACCINE: CPT | Performed by: INTERNAL MEDICINE

## 2021-04-30 ENCOUNTER — IMMUNIZATION (OUTPATIENT)
Dept: FAMILY PLANNING/WOMEN'S HEALTH CLINIC | Facility: IMMUNIZATION CENTER | Age: 56
End: 2021-04-30
Payer: COMMERCIAL

## 2021-04-30 ENCOUNTER — TELEPHONE (OUTPATIENT)
Dept: CARDIOLOGY | Facility: MEDICAL CENTER | Age: 56
End: 2021-04-30

## 2021-04-30 DIAGNOSIS — Z23 ENCOUNTER FOR VACCINATION: Primary | ICD-10-CM

## 2021-04-30 PROCEDURE — 91300 PFIZER SARS-COV-2 VACCINE: CPT

## 2021-04-30 PROCEDURE — 0002A PFIZER SARS-COV-2 VACCINE: CPT

## 2021-04-30 NOTE — TELEPHONE ENCOUNTER
TRELL CASTLE Key: [id number] Need help? Call us at [phone]   Outcome   Additional Information Required   Available without authorization.   DrugEzetimibe 10MG tablets   Form[employer] Exchange Electronic PA Form (2017 [place])

## 2021-04-30 NOTE — CONSULTS
Date of Service  12/16/2019    Reason For Consult  L foot osteomyelitis, necrotic toes    Requesting Physician  Donny Mcnally MD    Consulting Physician  Aleksander Chatterjee M.D.    Primary Care Physician  Mj Bai M.D.    Chief Complaint  Chest pain, SOB    History of Present Illness  Mr. Aviles is well-known to me with his history of chronic left foot wounds and osteo. He developed the wounds last spring and I was able to open the tibials and the DP percutaneously. His wounds initially did well and started to heal but he was not compliant with wound care or follow-up and they eventually started to deteriorate again this fall. Several months ago, we recommended a BKA but he declined any sort of amp.    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    He had a cardiac cath showing diffuse severe three-vessel disease, severely depressed EF, ischemic cardiomyopathy and possible LV thrombus. He is not yet revascularized from a coronary standpoint.    Review of Systems  Review of Systems   Constitutional: Positive for malaise/fatigue.   HENT: Negative.    Eyes: Negative.    Respiratory: Positive for cough.    Cardiovascular: Positive for chest pain and orthopnea.   Gastrointestinal: Negative.    Genitourinary: Negative.    Musculoskeletal: Negative.    Skin: Negative.    Neurological: Negative.    Endo/Heme/Allergies: Negative.    Psychiatric/Behavioral: Negative.        Past Medical History  Past Medical History:   Diagnosis Date   • Diabetes (HCC)        Surgical History  Past Surgical History:   Procedure Laterality Date   • IRRIGATION & DEBRIDEMENT ORTHO Left 6/24/2019    Procedure: IRRIGATION AND DEBRIDEMENT, WOUND-FOOT, BIOLOGIC PLACEMENT, WOUND VAC PLACEMENT;  Surgeon: Charles Chopra M.D.;   Patient called to advise that the fast acting insulin does not seem to be helping lower her blood sugar.   Please call to advise what else can be done.  Thank you!   Location: SURGERY [facility];  Service: Orthopedics        Family History  History reviewed. No pertinent family history.     Social History  Social History     Socioeconomic History   • Marital status:      Spouse name: Not on file   • Number of children: Not on file   • Years of education: Not on file   • Highest education level: Not on file   Occupational History   • Not on file   Social Needs   • Financial resource strain: Not on file   • Food insecurity:     Worry: Not on file     Inability: Not on file   • Transportation needs:     Medical: Not on file     Non-medical: Not on file   Tobacco Use   • Smoking status: Never Smoker   • Smokeless tobacco: Never Used   Substance and Sexual Activity   • Alcohol use: No   • Drug use: No   • Sexual activity: Not on file   Lifestyle   • Physical activity:     Days per week: Not on file     Minutes per session: Not on file   • Stress: Not on file   Relationships   • Social connections:     Talks on phone: Not on file     Gets together: Not on file     Attends Anabaptist service: Not on file     Active member of club or organization: Not on file     Attends meetings of clubs or organizations: Not on file     Relationship status: Not on file   • Intimate partner violence:     Fear of current or ex partner: Not on file     Emotionally abused: Not on file     Physically abused: Not on file     Forced sexual activity: Not on file   Other Topics Concern   • Not on file   Social History Narrative   • Not on file       Medications  Prior to Admission Medications   Prescriptions Last Dose Informant Patient Reported? Taking?   aspirin 81 MG EC tablet 12/15/2019 at AM Patient No No   Sig: Take 1 Tab by mouth every day.   atorvastatin (LIPITOR) 40 MG Tab 12/15/2019 at PM Patient Yes No   Sig: Take 40 mg by mouth every evening.   clopidogrel (PLAVIX) 75 MG Tab 12/15/2019 at 1800 Patient Yes No   Sig: Take 75 mg by mouth every bedtime.   glipiZIDE (GLUCOTROL) 5 MG Tab  12/15/2019 at PM Patient Yes No   Sig: Take 5 mg by mouth 2 times a day.   insulin glargine (LANTUS) 100 UNIT/ML Solution 12/16/2019 at 0130  Yes Yes   Sig: Inject 6 Units as instructed every evening as needed. Uses only if above 150    metformin (GLUCOPHAGE) 1000 MG tablet 12/15/2019 at PM Patient Yes No   Sig: Take 1,000 mg by mouth 2 times a day, with meals.      Facility-Administered Medications: None       Current Facility-Administered Medications   Medication Dose Route Frequency Provider Last Rate Last Dose   • senna-docusate (PERICOLACE or SENOKOT S) 8.6-50 MG per tablet 2 Tab  2 Tab Oral BID Yanni Ingram M.D.   2 Tab at 12/16/19 0757    And   • polyethylene glycol/lytes (MIRALAX) PACKET 1 Packet  1 Packet Oral QDAY PRN Yanni Ingram M.D.        And   • magnesium hydroxide (MILK OF MAGNESIA) suspension 30 mL  30 mL Oral QDAY PRN Yanni Ingram M.D.        And   • bisacodyl (DULCOLAX) suppository 10 mg  10 mg Rectal QDAY PRN Yanni Ingram M.D.       • Respiratory Care per Protocol   Nebulization Continuous RT Yanni Ingram M.D.       • enalaprilat (VASOTEC) injection 1.25 mg  1.25 mg Intravenous Q6HRS PRN Yanni Ingram M.D.       • cloNIDine (CATAPRES) tablet 0.1 mg  0.1 mg Oral Q6HRS PRN Yanni Ingram M.D.       • insulin glargine (LANTUS) injection 15 Units  0.2 Units/kg/day Subcutaneous Q EVENING Yanni Ingram M.D.        And   • insulin lispro (HUMALOG) injection 1-6 Units  1-6 Units Subcutaneous Q6HRS Yanni Ingram M.D.   Stopped at 12/16/19 0700    And   • glucose 4 g chewable tablet 16 g  16 g Oral Q15 MIN PRN Yanni Ingram M.D.        And   • DEXTROSE 10% BOLUS 250 mL  250 mL Intravenous Q15 MIN PRN Yanni Ingram M.D.       • insulin glargine (LANTUS) injection 5 Units  5 Units Subcutaneous Once Yanni Ingram M.D.   Stopped at 12/16/19 0715   • atorvastatin (LIPITOR) tablet 40 mg  40 mg Oral Nightly Yanni Ingram M.D.        • clopidogrel (PLAVIX) tablet 75 mg  75 mg Oral QHS Yanni Ingram M.D.       • aspirin EC (ECOTRIN) tablet 81 mg  81 mg Oral DAILY Yanni Ingram M.D.   81 mg at 12/16/19 0715   • ampicillin/sulbactam (UNASYN) 3 g in  mL IVPB  3 g Intravenous Q6HRS Nichole Morel M.D.       • carvedilol (COREG) tablet 3.125 mg  3.125 mg Oral BID WITH MEALS Nichole Morel M.D.       • valsartan (DIOVAN) tablet 40 mg  40 mg Oral TWICE DAILY Nichole Morel M.D.       • heparin injection 6,000 Units  6,000 Units Intravenous Once Nichole Morel M.D.        And   • heparin injection 3,200 Units  3,200 Units Intravenous PRN Nichole Morel M.D.        And   • heparin infusion 25,000 units in 500 mL 0.45% NACL   Intravenous Continuous Nichole Morel M.D.       • linezolid (ZYVOX) tablet 600 mg  600 mg Oral Q12HRS Angie Gonzalez M.D.           Allergies  No Known Allergies      Physical Exam  Temp:  [36.4 °C (97.5 °F)-37.4 °C (99.4 °F)] 36.8 °C (98.3 °F)  Pulse:  [94-99] 97  Resp:  [16-22] 16  BP: (118-139)/(69-90) 118/73  SpO2:  [93 %-98 %] 96 %    Pulse/Extremity Exam:    Femorals:        Right: palpable       Left palpable    Pedal Pulses:       Right DP monophasic       Right PT monophasic       Left DP monophasic       Left PT monophasic    Wounds: large full-thickness wounds on the dorsal and medial aspect of the foot. These are acutally improved over the past few months. Arcadio necrosis of the L 2nd/3rd toes    General appearance: NAD, conversing appropriately  Psych: Normal affect, mood, judgement  Neuro: CN II-XII grossly intact.   Neck: full range of motion  Lungs: No inspiratory stridor or wheezing  CV: RRR  Abdomen: Soft, NT/ND  Skin: No rashes    Labs Reviewed Today:  Recent Labs     12/16/19  0332 12/16/19  1614   WBC 8.7 6.3   RBC 3.80* 3.81*   HEMOGLOBIN 10.1* 10.3*   HEMATOCRIT 32.8* 32.3*   MCV 86.3 84.8   MCH 26.6* 27.0   MCHC 30.8* 31.9*   RDW 51.4* 49.7   PLATELETCT 140* 124*   MPV 10.0  10.2     Recent Labs     12/16/19  0332   SODIUM 125*   POTASSIUM 4.0   CHLORIDE 91*   CO2 29   GLUCOSE 366*   BUN 23*   CREATININE 0.88   CALCIUM 8.4*     Recent Labs     12/16/19  0332   ALTSGPT 48   ASTSGOT 43   ALKPHOSPHAT 85   TBILIRUBIN 0.4   GLUCOSE 366*     Recent Labs     12/16/19  1614   APTT 80.6*   INR 1.25*             No results for input(s): TROPONINI in the last 72 hours.    Urinalysis:    Recent Labs     12/16/19  0541   SPECGRAVITY 1.019   GLUCOSEUR >=1000*   KETONES Negative   NITRITE Negative   LEUKESTERAS Negative   WBCURINE 0-2*   RBCURINE 0-2*   BACTERIA Negative   EPITHELCELL Negative        Imaging Reviewed Today:  I personally reviewed all non-invasive vascular testing including images, x-rays, tracings, arterial waveforms, and duplex exams relevant to this admission. My interpretation is below:    Last vascualar testing was 8/2019.    L heart cath reviewed: findings noted in HPI    Assessment/Plan & Medical Decision-Making    Mr. Aviles presents with severe multivessel CAD and progression of his L foot osteo with gangrene of the 2nd/3rd toes. He was revascularized several months ago and we recommended an amputation at that time when flow was optimized.     No further vascular intervention planned at this point.     Will defer to ortho for amp.    Aleksander Chatterjee MD  Vascular Surgeon  [facility]  Office: [phone]

## 2021-05-07 NOTE — PROGRESS NOTES
Hospital Medicine Daily Progress Note    Date of Service  8/22/2019    Chief Complaint  53 y.o. male admitted 8/21/2019 with nonhealing diabetic ulcer with xray evidence of OM.    Hospital Course    8/22:  This 54 yo male with poorly controlled DM2, last Hg a1c 8.9%, recent thrombectomy 6/2019 with Dr. Chatterjee who developed original diabetic left foot ulceration 3/2019.  He has been followed by wound care clinic, but has had issues with compliance regarding taking his antibiotics.  He had a recent trip to [place] where he had stopped his Bactrim.  he recently was started on augmentin, but stopped after 3 days, thinking it was causing worsening edema in left foot.   MRI left foot confirms OM entire first metatarsal phalanx and sesamoids, septic arthropathy first MTP joint.  LPS requested to repeat his arterial u/s.*          Consultants/Specialty  LPS  ID Abdirizak  Vascular surgery Dr. Chatterjee    Code Status  full    Disposition  Needs OR amputation.  Patient has refused amputation in the past.      Review of Systems  Review of Systems   Constitutional: Negative for chills, diaphoresis, fever and malaise/fatigue.   HENT: Negative for congestion and sore throat.    Eyes: Negative for pain and discharge.   Respiratory: Negative for cough, hemoptysis, sputum production, shortness of breath and wheezing.    Cardiovascular: Negative for chest pain, palpitations, claudication and leg swelling.   Gastrointestinal: Negative for abdominal pain, constipation, diarrhea, melena, nausea and vomiting.   Genitourinary: Negative for dysuria, frequency and urgency.   Musculoskeletal: Positive for myalgias (left foot pain, ulcerations left distal 1st, 2nd and 3rd toes.). Negative for back pain, joint pain and neck pain.   Skin: Negative for itching and rash.   Neurological: Negative for dizziness, sensory change, speech change, focal weakness, loss of consciousness, weakness and headaches.   Endo/Heme/Allergies: Does not bruise/bleed  easily.   Psychiatric/Behavioral: Negative for depression, substance abuse and suicidal ideas.        Physical Exam  Temp:  [36.3 °C (97.3 °F)-36.8 °C (98.3 °F)] 36.3 °C (97.3 °F)  Pulse:  [84-89] 89  Resp:  [17-18] 18  BP: (128-144)/(78-79) 144/78  SpO2:  [95 %-98 %] 98 %    Physical Exam   Constitutional: He is oriented to person, place, and time. He appears well-developed and well-nourished. No distress.   HENT:   Head: Normocephalic and atraumatic.   Mouth/Throat: Oropharynx is clear and moist. No oropharyngeal exudate.   Eyes: Pupils are equal, round, and reactive to light. Conjunctivae and EOM are normal. Right eye exhibits no discharge. Left eye exhibits no discharge. No scleral icterus.   Neck: Normal range of motion. Neck supple. No JVD present. No tracheal deviation present. No thyromegaly present.   Cardiovascular: Normal rate, regular rhythm and normal heart sounds. Exam reveals no gallop and no friction rub.   No murmur heard.  Pulmonary/Chest: Effort normal and breath sounds normal. No respiratory distress. He has no wheezes. He has no rales. He exhibits no tenderness.   Abdominal: Soft. Bowel sounds are normal. He exhibits no distension and no mass. There is no tenderness. There is no rebound and no guarding.   Musculoskeletal: Normal range of motion. He exhibits edema (left 1, 2, 3rd distal toe ulceration seen, edema through 1st MTP joint) and tenderness.   Lymphadenopathy:     He has no cervical adenopathy.   Neurological: He is alert and oriented to person, place, and time. No cranial nerve deficit. He exhibits normal muscle tone.   Skin: Skin is warm and dry. No rash noted. He is not diaphoretic. No erythema.   Psychiatric: He has a normal mood and affect. His behavior is normal. Judgment and thought content normal.   Nursing note and vitals reviewed.      Fluids    Intake/Output Summary (Last 24 hours) at 8/22/2019 1925  Last data filed at 8/22/2019 1800  Gross per 24 hour   Intake 480 ml    Output --   Net 480 ml       Laboratory  Recent Labs     08/21/19  1203 08/22/19  0353   WBC 7.5 6.2   RBC 3.94* 3.97*   HEMOGLOBIN 11.8* 11.4*   HEMATOCRIT 36.0* 36.1*   MCV 91.4 90.9   MCH 29.9 28.7   MCHC 32.8* 31.6*   RDW 45.0 45.0   PLATELETCT 244 233   MPV 10.0 9.9     Recent Labs     08/21/19  1203 08/22/19  0353   SODIUM 136 139   POTASSIUM 4.4 4.0   CHLORIDE 101 105   CO2 25 26   GLUCOSE 223* 163*   BUN 16 12   CREATININE 0.75 0.65   CALCIUM 9.2 9.1     Recent Labs     08/21/19  1203   APTT 30.6   INR 0.97               Imaging  US-EXTREMITY ARTERY LOWER BILAT         US-ALYSSA SINGLE LEVEL BILAT   Final Result      MR-FOOT-WITH & W/O LEFT   Final Result      1.  First metatarsal-phalangeal joint septic arthropathy      2.  Osteomyelitis affecting the entire first metatarsal, proximal phalanx and sesamoids. Edema and enhancement in the adjacent medial cuneiform could be reactive or secondary to early septic arthropathy and osteomyelitis centered at the first TMT joint      3.  Suspicious for early second medial metatarsal head osteomyelitis. Septic arthropathy cannot be excluded      4.  Medial first metatarsal-phalangeal centered greater than dorsal medial forefoot skin ulceration with cellulitis and phlegmonous change. No abscess identified      5.  Second metatarsal shaft edema is more likely from stress response than infection      6.  Plantar fasciitis      DX-FOOT-COMPLETE 3+ LEFT   Final Result      1.  Soft tissue swelling and defect about the great toe MTP joint.   2.  Abnormal lucency is again noted about the first MTP joint suggestive of osteomyelitis/infection. Further evaluation with MRI without and with contrast is suggested.      US-EXTREMITY ARTERY LOWER BILAT    (Results Pending)        Assessment/Plan  * Osteomyelitis of left foot (HCC)- (present on admission)  Assessment & Plan  XRAY of the left foot showed Abnormal lucency noted about the first MTP joint suggestive of  osteomyelitis/infection.  MRI of the left foot confirms OM 1st metatarsal and septic arthropathy 1st MTP joint, 2nd Metatarsal OM as well.  Needs amputation.  Started on IV antibiotics with Zosyn and vancomycin.  Orthopedic surgery consulted.  ID consult appreciated.  Ordered BCs x2 on 8/22 a.m after start of antibiotics.    Diabetic foot (HCC)- (present on admission)  Assessment & Plan  With foot ulcer.  Now with osteomyelitis.  Orthopedic surgery and wound care following.  MRI confirmed OM 1st phalanx and sesamoids and septic arthropathy MTP 1st joint.    Normocytic anemia- (present on admission)  Assessment & Plan  Monitor H&H    Uncontrolled type 2 diabetes mellitus with hyperglycemia (HCC)- (present on admission)  Assessment & Plan  Hemoglobin A1c was 8.9% in June 2019  Continue with sliding scale insulin  Maintain blood glucoses below 150 mg/dL  Repeat a1c    Peripheral arterial disease (HCC)- (present on admission)  Assessment & Plan  On Plavix and statin at home.  We will continue.       VTE prophylaxis: lovenox       Arava Counseling:  Patient counseled regarding adverse effects of Arava including but not limited to nausea, vomiting, abnormalities in liver function tests. Patients may develop mouth sores, rash, diarrhea, and abnormalities in blood counts. The patient understands that monitoring is required including LFTs and blood counts.  There is a rare possibility of scarring of the liver and lung problems that can occur when taking methotrexate. Persistent nausea, loss of appetite, pale stools, dark urine, cough, and shortness of breath should be reported immediately. Patient advised to discontinue Arava treatment and consult with a physician prior to attempting conception. The patient will have to undergo a treatment to eliminate Arava from the body prior to conception.

## 2021-06-08 ENCOUNTER — TELEPHONE (OUTPATIENT)
Dept: CARDIOLOGY | Facility: MEDICAL CENTER | Age: 56
End: 2021-06-08

## 2021-06-08 DIAGNOSIS — E11.65 UNCONTROLLED TYPE 2 DIABETES MELLITUS WITH HYPERGLYCEMIA (HCC): ICD-10-CM

## 2021-06-08 DIAGNOSIS — I50.20 HFREF (HEART FAILURE WITH REDUCED EJECTION FRACTION) (HCC): ICD-10-CM

## 2021-06-08 DIAGNOSIS — E78.2 MIXED HYPERLIPIDEMIA: ICD-10-CM

## 2021-06-08 NOTE — TELEPHONE ENCOUNTER
VR    Pt called asking if his lab orders for a lipid panel and any other lab work VR would like him to get done could be faxed to [facility]. Pt would like a Murfie message when the orders have been sent.    Thank you

## 2021-06-09 NOTE — TELEPHONE ENCOUNTER
Mahesh Segovia M.D.  You; Lyubov Silveira, Ernesto Ass't 33 minutes ago (7:57 AM)     Oh sure. A1c and CMP. Thanks for asking Lyubov!      Orders placed

## 2021-06-22 DIAGNOSIS — I50.20 HFREF (HEART FAILURE WITH REDUCED EJECTION FRACTION) (HCC): ICD-10-CM

## 2021-07-02 LAB
ALBUMIN SERPL-MCNC: 4.4 G/DL (ref 3.8–4.9)
ALBUMIN/GLOB SERPL: 1.5 {RATIO} (ref 1.2–2.2)
ALP SERPL-CCNC: 84 IU/L (ref 48–121)
ALT SERPL-CCNC: 12 IU/L (ref 0–44)
AST SERPL-CCNC: 15 IU/L (ref 0–40)
BILIRUB SERPL-MCNC: 0.3 MG/DL (ref 0–1.2)
BNP SERPL-MCNC: 29.4 PG/ML (ref 0–100)
BUN SERPL-MCNC: 12 MG/DL (ref 6–24)
BUN/CREAT SERPL: 14 (ref 9–20)
CALCIUM SERPL-MCNC: 9.5 MG/DL (ref 8.7–10.2)
CHLORIDE SERPL-SCNC: 103 MMOL/L (ref 96–106)
CHOLEST SERPL-MCNC: 197 MG/DL (ref 100–199)
CO2 SERPL-SCNC: 27 MMOL/L (ref 20–29)
CREAT SERPL-MCNC: 0.86 MG/DL (ref 0.76–1.27)
EST. AVERAGE GLUCOSE BLD GHB EST-MCNC: 174 MG/DL
GLOBULIN SER CALC-MCNC: 3 G/DL (ref 1.5–4.5)
GLUCOSE SERPL-MCNC: 95 MG/DL (ref 65–99)
HBA1C MFR BLD: 7.7 % (ref 4.8–5.6)
HDLC SERPL-MCNC: 46 MG/DL
LABORATORY COMMENT REPORT: ABNORMAL
LDLC SERPL CALC-MCNC: 126 MG/DL (ref 0–99)
POTASSIUM SERPL-SCNC: 5 MMOL/L (ref 3.5–5.2)
PROT SERPL-MCNC: 7.4 G/DL (ref 6–8.5)
SODIUM SERPL-SCNC: 142 MMOL/L (ref 134–144)
TRIGL SERPL-MCNC: 140 MG/DL (ref 0–149)
VLDLC SERPL CALC-MCNC: 25 MG/DL (ref 5–40)

## 2021-07-05 ENCOUNTER — APPOINTMENT (OUTPATIENT)
Dept: RADIOLOGY | Facility: MEDICAL CENTER | Age: 56
End: 2021-07-05
Attending: EMERGENCY MEDICINE
Payer: COMMERCIAL

## 2021-07-05 ENCOUNTER — HOSPITAL ENCOUNTER (OUTPATIENT)
Facility: MEDICAL CENTER | Age: 56
End: 2021-07-06
Attending: EMERGENCY MEDICINE | Admitting: HOSPITALIST
Payer: COMMERCIAL

## 2021-07-05 ENCOUNTER — APPOINTMENT (OUTPATIENT)
Dept: RADIOLOGY | Facility: MEDICAL CENTER | Age: 56
End: 2021-07-05
Payer: COMMERCIAL

## 2021-07-05 DIAGNOSIS — G45.9 TIA (TRANSIENT ISCHEMIC ATTACK): ICD-10-CM

## 2021-07-05 LAB
ABO GROUP BLD: NORMAL
ALBUMIN SERPL BCP-MCNC: 4 G/DL (ref 3.2–4.9)
ALBUMIN/GLOB SERPL: 1.3 G/DL
ALP SERPL-CCNC: 72 U/L (ref 30–99)
ALT SERPL-CCNC: 12 U/L (ref 2–50)
ANION GAP SERPL CALC-SCNC: 9 MMOL/L (ref 7–16)
APTT PPP: 32.7 SEC (ref 24.7–36)
AST SERPL-CCNC: 21 U/L (ref 12–45)
BASOPHILS # BLD AUTO: 0.5 % (ref 0–1.8)
BASOPHILS # BLD: 0.02 K/UL (ref 0–0.12)
BILIRUB SERPL-MCNC: 0.3 MG/DL (ref 0.1–1.5)
BLD GP AB SCN SERPL QL: NORMAL
BUN SERPL-MCNC: 9 MG/DL (ref 8–22)
CALCIUM SERPL-MCNC: 8.8 MG/DL (ref 8.5–10.5)
CHLORIDE SERPL-SCNC: 101 MMOL/L (ref 96–112)
CO2 SERPL-SCNC: 27 MMOL/L (ref 20–33)
CREAT SERPL-MCNC: 0.71 MG/DL (ref 0.5–1.4)
EOSINOPHIL # BLD AUTO: 0.03 K/UL (ref 0–0.51)
EOSINOPHIL NFR BLD: 0.7 % (ref 0–6.9)
ERYTHROCYTE [DISTWIDTH] IN BLOOD BY AUTOMATED COUNT: 44.2 FL (ref 35.9–50)
GLOBULIN SER CALC-MCNC: 3 G/DL (ref 1.9–3.5)
GLUCOSE BLD-MCNC: 211 MG/DL (ref 65–99)
GLUCOSE SERPL-MCNC: 139 MG/DL (ref 65–99)
HCT VFR BLD AUTO: 40.8 % (ref 42–52)
HGB BLD-MCNC: 13.5 G/DL (ref 14–18)
IMM GRANULOCYTES # BLD AUTO: 0.01 K/UL (ref 0–0.11)
IMM GRANULOCYTES NFR BLD AUTO: 0.2 % (ref 0–0.9)
INR PPP: 1.02 (ref 0.87–1.13)
LYMPHOCYTES # BLD AUTO: 1.35 K/UL (ref 1–4.8)
LYMPHOCYTES NFR BLD: 31 % (ref 22–41)
MCH RBC QN AUTO: 30.8 PG (ref 27–33)
MCHC RBC AUTO-ENTMCNC: 33.1 G/DL (ref 33.7–35.3)
MCV RBC AUTO: 92.9 FL (ref 81.4–97.8)
MONOCYTES # BLD AUTO: 0.31 K/UL (ref 0–0.85)
MONOCYTES NFR BLD AUTO: 7.1 % (ref 0–13.4)
NEUTROPHILS # BLD AUTO: 2.63 K/UL (ref 1.82–7.42)
NEUTROPHILS NFR BLD: 60.5 % (ref 44–72)
NRBC # BLD AUTO: 0 K/UL
NRBC BLD-RTO: 0 /100 WBC
PLATELET # BLD AUTO: 144 K/UL (ref 164–446)
PMV BLD AUTO: 10.6 FL (ref 9–12.9)
POTASSIUM SERPL-SCNC: 4.6 MMOL/L (ref 3.6–5.5)
PROT SERPL-MCNC: 7 G/DL (ref 6–8.2)
PROTHROMBIN TIME: 13.1 SEC (ref 12–14.6)
RBC # BLD AUTO: 4.39 M/UL (ref 4.7–6.1)
RH BLD: NORMAL
SODIUM SERPL-SCNC: 137 MMOL/L (ref 135–145)
TROPONIN T SERPL-MCNC: 10 NG/L (ref 6–19)
WBC # BLD AUTO: 4.4 K/UL (ref 4.8–10.8)

## 2021-07-05 PROCEDURE — 85025 COMPLETE CBC W/AUTO DIFF WBC: CPT

## 2021-07-05 PROCEDURE — 85730 THROMBOPLASTIN TIME PARTIAL: CPT

## 2021-07-05 PROCEDURE — 71045 X-RAY EXAM CHEST 1 VIEW: CPT

## 2021-07-05 PROCEDURE — 80053 COMPREHEN METABOLIC PANEL: CPT

## 2021-07-05 PROCEDURE — 70553 MRI BRAIN STEM W/O & W/DYE: CPT

## 2021-07-05 PROCEDURE — A9576 INJ PROHANCE MULTIPACK: HCPCS

## 2021-07-05 PROCEDURE — 700102 HCHG RX REV CODE 250 W/ 637 OVERRIDE(OP)

## 2021-07-05 PROCEDURE — 700102 HCHG RX REV CODE 250 W/ 637 OVERRIDE(OP): Performed by: PSYCHIATRY & NEUROLOGY

## 2021-07-05 PROCEDURE — 99218 PR INITIAL OBSERVATION CARE,LEVL I: CPT | Mod: GC | Performed by: HOSPITALIST

## 2021-07-05 PROCEDURE — 70498 CT ANGIOGRAPHY NECK: CPT

## 2021-07-05 PROCEDURE — 82962 GLUCOSE BLOOD TEST: CPT

## 2021-07-05 PROCEDURE — 70496 CT ANGIOGRAPHY HEAD: CPT | Mod: MH

## 2021-07-05 PROCEDURE — 700117 HCHG RX CONTRAST REV CODE 255: Performed by: EMERGENCY MEDICINE

## 2021-07-05 PROCEDURE — 700117 HCHG RX CONTRAST REV CODE 255

## 2021-07-05 PROCEDURE — 86900 BLOOD TYPING SEROLOGIC ABO: CPT

## 2021-07-05 PROCEDURE — 0042T CT-CEREBRAL PERFUSION ANALYSIS: CPT

## 2021-07-05 PROCEDURE — G0378 HOSPITAL OBSERVATION PER HR: HCPCS

## 2021-07-05 PROCEDURE — 93005 ELECTROCARDIOGRAM TRACING: CPT | Performed by: EMERGENCY MEDICINE

## 2021-07-05 PROCEDURE — 86850 RBC ANTIBODY SCREEN: CPT

## 2021-07-05 PROCEDURE — 85610 PROTHROMBIN TIME: CPT

## 2021-07-05 PROCEDURE — 99204 OFFICE O/P NEW MOD 45 MIN: CPT | Performed by: PSYCHIATRY & NEUROLOGY

## 2021-07-05 PROCEDURE — 96372 THER/PROPH/DIAG INJ SC/IM: CPT

## 2021-07-05 PROCEDURE — A9270 NON-COVERED ITEM OR SERVICE: HCPCS

## 2021-07-05 PROCEDURE — 84484 ASSAY OF TROPONIN QUANT: CPT

## 2021-07-05 PROCEDURE — 99285 EMERGENCY DEPT VISIT HI MDM: CPT

## 2021-07-05 PROCEDURE — 86901 BLOOD TYPING SEROLOGIC RH(D): CPT

## 2021-07-05 PROCEDURE — A9270 NON-COVERED ITEM OR SERVICE: HCPCS | Performed by: PSYCHIATRY & NEUROLOGY

## 2021-07-05 PROCEDURE — 70450 CT HEAD/BRAIN W/O DYE: CPT

## 2021-07-05 RX ORDER — ATORVASTATIN CALCIUM 80 MG/1
80 TABLET, FILM COATED ORAL EVERY EVENING
Status: DISCONTINUED | OUTPATIENT
Start: 2021-07-05 | End: 2021-07-06 | Stop reason: HOSPADM

## 2021-07-05 RX ORDER — ACETAMINOPHEN 325 MG/1
650 TABLET ORAL EVERY 6 HOURS PRN
Status: DISCONTINUED | OUTPATIENT
Start: 2021-07-05 | End: 2021-07-06 | Stop reason: HOSPADM

## 2021-07-05 RX ORDER — ATORVASTATIN CALCIUM 80 MG/1
80 TABLET, FILM COATED ORAL DAILY
Status: DISCONTINUED | OUTPATIENT
Start: 2021-07-06 | End: 2021-07-05

## 2021-07-05 RX ORDER — EZETIMIBE 10 MG/1
10 TABLET ORAL DAILY
Status: DISCONTINUED | OUTPATIENT
Start: 2021-07-06 | End: 2021-07-06 | Stop reason: HOSPADM

## 2021-07-05 RX ORDER — DEXTROSE MONOHYDRATE 25 G/50ML
50 INJECTION, SOLUTION INTRAVENOUS
Status: DISCONTINUED | OUTPATIENT
Start: 2021-07-05 | End: 2021-07-06 | Stop reason: HOSPADM

## 2021-07-05 RX ADMIN — IOHEXOL 80 ML: 350 INJECTION, SOLUTION INTRAVENOUS at 13:03

## 2021-07-05 RX ADMIN — IOHEXOL 40 ML: 350 INJECTION, SOLUTION INTRAVENOUS at 13:07

## 2021-07-05 RX ADMIN — ASPIRIN 81 MG: 81 TABLET, COATED ORAL at 15:42

## 2021-07-05 RX ADMIN — METFORMIN HYDROCHLORIDE 500 MG: 500 TABLET ORAL at 17:57

## 2021-07-05 RX ADMIN — ATORVASTATIN CALCIUM 80 MG: 80 TABLET, FILM COATED ORAL at 17:57

## 2021-07-05 RX ADMIN — GADOTERIDOL 15 ML: 279.3 INJECTION, SOLUTION INTRAVENOUS at 18:53

## 2021-07-05 RX ADMIN — RIVAROXABAN 2.5 MG: 2.5 TABLET, FILM COATED ORAL at 15:42

## 2021-07-05 RX ADMIN — INSULIN HUMAN 2 UNITS: 100 INJECTION, SOLUTION PARENTERAL at 21:47

## 2021-07-05 ASSESSMENT — LIFESTYLE VARIABLES
CONSUMPTION TOTAL: NEGATIVE
ON A TYPICAL DAY WHEN YOU DRINK ALCOHOL HOW MANY DRINKS DO YOU HAVE: 0
AVERAGE NUMBER OF DAYS PER WEEK YOU HAVE A DRINK CONTAINING ALCOHOL: 0
HAVE PEOPLE ANNOYED YOU BY CRITICIZING YOUR DRINKING: NO
TOTAL SCORE: 0
HOW MANY TIMES IN THE PAST YEAR HAVE YOU HAD 5 OR MORE DRINKS IN A DAY: 0
TOTAL SCORE: 0
EVER FELT BAD OR GUILTY ABOUT YOUR DRINKING: NO
EVER HAD A DRINK FIRST THING IN THE MORNING TO STEADY YOUR NERVES TO GET RID OF A HANGOVER: NO
ALCOHOL_USE: NO
HAVE YOU EVER FELT YOU SHOULD CUT DOWN ON YOUR DRINKING: NO
TOTAL SCORE: 0
DOES PATIENT WANT TO STOP DRINKING: NO

## 2021-07-05 ASSESSMENT — COGNITIVE AND FUNCTIONAL STATUS - GENERAL
WALKING IN HOSPITAL ROOM: A LITTLE
SUGGESTED CMS G CODE MODIFIER MOBILITY: CJ
CLIMB 3 TO 5 STEPS WITH RAILING: A LITTLE
DAILY ACTIVITIY SCORE: 24
STANDING UP FROM CHAIR USING ARMS: A LITTLE
MOVING FROM LYING ON BACK TO SITTING ON SIDE OF FLAT BED: A LITTLE
SUGGESTED CMS G CODE MODIFIER DAILY ACTIVITY: CH
MOBILITY SCORE: 20

## 2021-07-05 ASSESSMENT — ENCOUNTER SYMPTOMS
FEVER: 0
WEIGHT LOSS: 0
HEADACHES: 0
DOUBLE VISION: 0
COUGH: 0
VOMITING: 0
NAUSEA: 0
CHILLS: 0
FOCAL WEAKNESS: 0
PALPITATIONS: 0
WEAKNESS: 0
DIZZINESS: 0
SPEECH CHANGE: 0
ORTHOPNEA: 0
BLURRED VISION: 0
HEARTBURN: 0
SENSORY CHANGE: 0

## 2021-07-05 ASSESSMENT — FIBROSIS 4 INDEX: FIB4 SCORE: 1.57

## 2021-07-05 NOTE — ED PROVIDER NOTES
ED Provider Note    Scribed for Aleksander Carrero M.D. by Lynn Lopez. 7/5/2021  12:48 PM    Primary care provider: Donny Mcnally M.D.  Means of arrival: EMS  History obtained from: Patient   History limited by: None    CHIEF COMPLAINT  Possible Stroke  - left upper extremity weakness and numbness  - right lower extremity weakness    HPI  Israel Aviles is a 55 y.o. male who presents to the Emergency Department with left arm weakness onset 9:30 AM. He noticed his arm weakness when brushing his teeth. He also endorses left arm numbness which he noticed while trying to wash his hands as well as right leg weakness. He was so weak he almost fell two times.  He denies facial droop, visual changes, chest pain, shortness of breath, nausea, vomiting, and headache. He says his symptoms have improved drastically since onset. He had a stroke in 2018 where he experienced left sided facial droop. After the stroke, he was put on Plavix. He recently was taken off of the Plavix and put on Xarelto. He has a history of diabetes. He had a severe infection in his leg in 2020 which led to an amputation. He denies a history of atrial fibrillation and hypertension.     REVIEW OF SYSTEMS  Pertinent positives include: left arm weakness, left arm numbness, right leg weakness.  Pertinent negatives include:  facial droop, visual changes, chest pain, shortness of breath, nausea, vomiting, and headache.  10+ systems reviewed and negative.      PAST MEDICAL HISTORY  Past Medical History:   Diagnosis Date   • CAD (coronary artery disease)    • Diabetes (HCC)    • Hyperlipidemia    • Hypertension        FAMILY HISTORY  Family History   Problem Relation Age of Onset   • Diabetes Mother    • Diabetes Father        SOCIAL HISTORY  Social History     Tobacco Use   • Smoking status: Never Smoker   • Smokeless tobacco: Never Used   Vaping Use   • Vaping Use: Never used   Substance Use Topics   • Alcohol use: No   • Drug use: No     Social  History     Substance and Sexual Activity   Drug Use No       SURGICAL HISTORY  Past Surgical History:   Procedure Laterality Date   • KNEE AMPUTATION BELOW Left 12/20/2019    Procedure: AMPUTATION, BELOW KNEE;  Surgeon: Koby Zhao M.D.;  Location: SURGERY [facility];  Service: Orthopedics   • ZZZ CARDIAC CATH  12/16/2019    multi-vessel disease   • IRRIGATION & DEBRIDEMENT ORTHO Left 6/24/2019    Procedure: IRRIGATION AND DEBRIDEMENT, WOUND-FOOT, BIOLOGIC PLACEMENT, WOUND VAC PLACEMENT;  Surgeon: Charles Chopra M.D.;  Location: SURGERY [facility];  Service: Orthopedics       CURRENT MEDICATIONS  Current Outpatient Medications   Medication Instructions   • aspirin 81 mg, Oral, DAILY   • atorvastatin (LIPITOR) 80 mg, Oral, DAILY   • Empagliflozin (JARDIANCE) 10 MG Tab 1 tablet, Oral, DAILY   • ezetimibe (ZETIA) 10 mg, Oral, DAILY   • glipiZIDE (GLUCOTROL) 5 MG Tab TAKE 1 TABLET BY MOUTH TWICE DAILY FOR DIABETES   • metformin (GLUCOPHAGE) 1,000 mg, Oral, 2 TIMES DAILY WITH MEALS   • metoprolol SR (TOPROL XL) 12.5 mg, Oral, EVERY EVENING   • rivaroxaban (XARELTO) 2.5 mg, Oral, 2 TIMES DAILY       ALLERGIES  No Known Allergies    PHYSICAL EXAM  VITAL SIGNS: /91   Pulse 82   Temp 36.5 °C (97.7 °F) (Temporal)   Resp 16   Ht 1.829 m (6')   Wt 77.6 kg (171 lb)   SpO2 92%   BMI 23.19 kg/m²   Reviewed and hypertensive    Constitutional: Well developed, Well nourished.  HENT: Normocephalic, atraumatic, bilateral external ears normal, wearing a mask.   Eyes: PERRLA, conjunctiva pink, no scleral icterus.   Cardiovascular: Regular rate and rhythm. No murmurs, rubs or gallops.  No dependent edema or calf tenderness  Respiratory: Lungs clear to auscultation bilaterally. No wheezes, rales, or rhonchi.  Abdominal:  Abdomen soft, non-tender, non distended. No rebound, or guarding.    Skin: No erythema, no rash.   Genitourinary: No costovertebral angle tenderness.   Musculoskeletal: no deformities.    Neurologic: LOC: Normal.      Motor left leg: No drift.  LOC questions; answers correctly.     Motor right leg: No drift.  Commands: Follows.     Limb ataxia: None.  Best gaze: Normal.      Sensation: Intact to light touch 4 limbs.  Visual fields: Intact by quadrants.     Language: Fluent.  Facial palsy: None.     Dysarthria: Normal.  Motor left arm: No drift.     Extinction: Normal.  Motor right arm: No drift.     Score: 0 although he had a score of 1 on arrival.    Psychiatric: Affect normal, Judgment normal, Mood normal.     DIFFERENTIAL DIAGNOSIS:  Stroke, TIA, complex migraine, atrial fibrillation     EKG Interpretation:  Interpreted by me    Rhythm:  Normal sinus rhythm   Rate: 83  Axis: normal  Ectopy: none  Conduction: normal  ST Segments: non specific lateral ST change  T Waves: no acute change  Q Waves: none  R Waves: Poor septal R wave progression  Clinical Impression: Sinus rhythm with poor R wave progression and stable lateral ST change     RADIOLOGY/PROCEDURES  CT-CEREBRAL PERFUSION ANALYSIS   Final Result      1.  Cerebral blood flow less than 30% likely representing completed infarct = 0 mL.      2.  T Max more than 6 seconds likely representing combination of completed infarct and ischemia = 0 mL.      3.  Mismatched volume likely representing ischemic brain/penumbra = None      4.  Please note that the cerebral perfusion was performed on the limited brain tissue around the basal ganglia region. Infarct/ischemia outside the CT perfusion sections can be missed in this study.      CT-CTA NECK WITH & W/O-POST PROCESSING   Final Result      Mild atherosclerotic disease. Resulting stenosis is less than 50%      CT-CTA HEAD WITH & W/O-POST PROCESS   Final Result      1.  No large vessel occlusion or aneurysm is identified.      CT-HEAD W/O   Final Result      1.  No acute intracranial findings.      2.  Periventricular white matter changes, consistent with chronic small vessel         DX-CHEST-PORTABLE  (1 VIEW)    (Results Pending)     Radiologist interpretation have been reviewed by me.     LABORATORY:  Full stroke lab work-up still pending    Lab results reviewed by me.     INTERVENTIONS:  Medications   iohexol (OMNIPAQUE) 350 mg/mL (80 mL Intravenous Given 7/5/21 1303)   iohexol (OMNIPAQUE) 350 mg/mL (40 mL Intravenous Given 7/5/21 1307)     This discussed with Dr. Haynes who evaluated the patient with me at triage.  At that time his NIH stroke score was 1.    ED COURSE:  Nursing notes, VS, PMSFHx reviewed in chart.     12:48 PM - Patient seen and examined at the charge desk. Ordered DX-Chest, CT-Head, CT-Cerebral Perfusion Analysis, CT-CTA Head, CT-CTA Neck, and EKG to evaluate.     1:20 PM - Patient was reevaluated at bedside. Discussed  Radiology results with the patient and informed them about the plan to wait until we receive all the other lab results.  This point the patient feels his weakness symptoms have resolved and his NIH stroke score has improved to 0    Discussed with [facility] internal medicine resident service who will admit the patient.    MEDICAL DECISION MAKING:  Chart review the patient had a prior pontine lacunar stroke and a prior ovoid stroke in the posterior frontal periventricular white matter.  Today it appears that the patient has had a TIA.  He is anticoagulated with Xarelto.  He will require admission for MRI and observation and determination of whether he is on the most appropriate stroke prevention medication.  He does need improved hypertension control which can be addressed this hospitalization.    PLAN:  As above    CONDITION: Fair.     FINAL IMPRESSION  1. TIA (transient ischemic attack)    2.      Essential hypertension  3.      Anticoagulated     Lynn JASSO), am scribing for, and in the presence of, Aleksander Carrero M.D..    Electronically signed by: Lynn Cotton), 7/5/2021    Aleksander JASSO M.D. personally performed the services described in this  documentation, as scribed by Lynn Lopez in my presence, and it is both accurate and complete.    The note accurately reflects work and decisions made by me.  Aleksander Carrero M.D.  7/5/2021  2:25 PM

## 2021-07-05 NOTE — NON-PROVIDER
History & Physical Note    Date of Admission: 7/5/2021  [facility] Team: [facility] Team  Attending: Dr. Peters  Senior Resident: Dr. Miles  Intern: Dr. Alicia    Chief Complaint:   Chief Complaint   Patient presents with   • Possible Stroke     Pt to Ed as Stroke pre-alert. Pre-alert initiated 1234 today. Pt door-time 1241. Pt LKW 0930. Pt ineligable for TPA. BP at arrival 146/88, . Pt has hx of xeralto. Symptomatic presentations include LUE weakness with resolution upon arrival.     HPI:  Israel Aviles is a 55 y.o. male who was admitted for TIA with left-sided weakness lasting multiple hours this AM, now resolved, with PMH of T2DM, HTN, CAD, HLD, PAD s/p angioplasty c/b osteomyelitis s/p LLE BKA on 12/20/19, stroke to left posterior frontal lobe and pontine lacunar stroke in 2018. Per patient, last night he felt slightly hypoglycemic with his sugar in the 70s so he consumed some juice and went to bed feeling better. At 9:30 this morning, he awoke with left-sided weakness and numbness in his arm and leg. Pt states he tried to brush his teeth and noticed difficulties using his hands and then had difficulty attempting to walk, nearly falling. Denies any facial symptoms including droops, visual changes, speech changes, difficulty swallowing, headache, chest pain, palpitations. Currently, the patient states he feels back to baseline and denies any new symptoms. He has a cardiologist who has him on Aspirin 81mg QD and Xarelto 2.5mg BID but he does not use the aspirin as prescribed because he feels his blood is too thin. He was also very recently switched to Xarelto from Plavix but notes he takes both of those as described.    Review of Systems:  Positive for left-sided weakness (resolved). Negative for facial droops, visual changes, speech changes, difficulty swallowing, headache, chest pain.    Past Medical History:   Past Medical History was reviewed with patient.   has a past medical history of CAD (coronary  artery disease), Diabetes (HCC), Hyperlipidemia, and Hypertension. PAD s/p angioplasty, Stroke to left posterior frontal lobe and pontine lacunar stroke in 2018. Denies any history of arrhythmias.     Past Surgical History: Past Surgical History was reviewed with patient.   has a past surgical history that includes irrigation & debridement ortho (Left, 6/24/2019); knee amputation below (Left, 12/20/2019); and zzz cardiac cath (12/16/2019).    Medications: Medications have been reviewed with patient.  Prior to Admission Medications   Prescriptions Last Dose Informant Patient Reported? Taking?   atorvastatin (LIPITOR) 80 MG tablet 7/4/2021 at 0800  No No   Sig: Take 1 Tab by mouth every day.   ezetimibe (ZETIA) 10 MG Tab 7/4/2021 at 0800  No No   Sig: Take 1 Tab by mouth every day.   metFORMIN (GLUCOPHAGE) 500 MG Tab 7/4/2021 at 1900  Yes Yes   Sig: Take 500 mg by mouth 2 times a day.   metoprolol SR (TOPROL XL) 25 MG TABLET SR 24 HR 7/4/2021 at 0800  No No   Sig: Take 0.5 Tabs by mouth every evening.   rivaroxaban (XARELTO) 2.5 MG tablet 7/4/2021 at 1900  No No   Sig: Take 1 tablet by mouth 2 times a day.      Facility-Administered Medications: None     Allergies: Allergies have been reviewed with patient.  No Known Allergies    Family History:  family history includes Diabetes in his father and mother.     Social History:   Tobacco: Denies   Alcohol: Denies   Recreational drugs (illegal and prescription):  Denies   Living situation:  Lives in [place] with wife. 2 Daughters who live in [place] and [place].    Physical Exam:  Vitals:  Temp:  [35.9 °C (96.7 °F)-36.5 °C (97.7 °F)] 35.9 °C (96.7 °F)  Pulse:  [75-82] 75  Resp:  [16] 16  BP: (150-160)/(88-91) 150/88  SpO2:  [92 %-98 %] 98 %    Physical Exam:  Constitutional: Well-developed, well-nourished, and in no apparent distress. Wife near bedside.  HEENT: Normocephalic and atraumatic, PERRLA, EOMI.  Neck: Normal range of motion.   Cardiovascular: Normal rate, regular  rhythm and normal heart sounds.   Pulmonary/Chest: Breath sounds normal.  Abdomen: Soft, non-tender.  Musculoskeletal: Normal range of motion. 5/5 strength in all extremities.  Neurological: A&Ox4, CN II-XII fully intact, no focal deficits noted. No facial droop.  Psychiatric: Affect normal.     Labs:   Labs Reviewed   CBC WITH DIFFERENTIAL - Abnormal; Notable for the following components:       Result Value    WBC 4.4 (*)     RBC 4.39 (*)     Hemoglobin 13.5 (*)     Hematocrit 40.8 (*)     MCHC 33.1 (*)     Platelet Count 144 (*)     All other components within normal limits    Narrative:     Indicate which anticoagulants the patient is on:->UNKNOWN   COMP METABOLIC PANEL - Abnormal; Notable for the following components:    Glucose 139 (*)     All other components within normal limits    Narrative:     Indicate which anticoagulants the patient is on:->UNKNOWN   PROTHROMBIN TIME    Narrative:     Indicate which anticoagulants the patient is on:->UNKNOWN   APTT    Narrative:     Indicate which anticoagulants the patient is on:->UNKNOWN   COD (ADULT)   TROPONIN    Narrative:     Indicate which anticoagulants the patient is on:->UNKNOWN   ESTIMATED GFR    Narrative:     Indicate which anticoagulants the patient is on:->UNKNOWN     Imaging:   DX-CHEST-PORTABLE (1 VIEW)   Final Result      No acute cardiopulmonary findings.      CT-CEREBRAL PERFUSION ANALYSIS   Final Result      1.  Cerebral blood flow less than 30% likely representing completed infarct = 0 mL.      2.  T Max more than 6 seconds likely representing combination of completed infarct and ischemia = 0 mL.      3.  Mismatched volume likely representing ischemic brain/penumbra = None      4.  Please note that the cerebral perfusion was performed on the limited brain tissue around the basal ganglia region. Infarct/ischemia outside the CT perfusion sections can be missed in this study.      CT-CTA NECK WITH & W/O-POST PROCESSING   Final Result      Mild  atherosclerotic disease. Resulting stenosis is less than 50%      CT-CTA HEAD WITH & W/O-POST PROCESS   Final Result      1.  No large vessel occlusion or aneurysm is identified.      CT-HEAD W/O   Final Result      1.  No acute intracranial findings.      2.  Periventricular white matter changes, consistent with chronic small vessel           Assessment & Plan  Israel Aviles is a 55 y.o. male who was admitted for TIA with left-sided weakness lasting multiple hours this AM, now resolved, with PMH of T2DM, HTN, CAD, HLD, PAD s/p angioplasty c/b osteomyelitis s/p LLE BKA on 12/20/19, stroke to left posterior frontal lobe and pontine lacunar stroke in 2018.    # Transient Ischemic Attack  # L Sided Weakness, resolved  Acute onset L sided weakness onset this AM which resolved after multiple hours. On exam, patient has full recovery of neurologic function. CT Head, Neck, Chest, and Cerebral Perfusion all indicate scattered atherosclerotic disease with no signs of a hemorrhagic stroke.  - Dr. Haynes, Neurology, consulted who advises patient has TTE and MRI-Brain prior to discharge. Recommend continuing home Xarelto 2.5mg BID, 81mg Aspirin, and 80mg Atorvastatin for LDL<70. Appreciate recs  - Consulted PT/OT/Speech  - Fall, Seizure, Aspiration precautions  - Pending TTE, MR Brain W/ and W/O, CRP, Lipid Profile, ESR, Mg  - Permissive hypertensive for next day, resume anti-HTN medications after    # Type 2 Diabetes  - A1c done 7/1/21 is 7.7  - Continue home Ezetimibe 10mg QD and Metformin 500mg BID  - Placed on low SSi  - Managed by Dr. Mcnally, Family Medicine    # HFrecEF, NYHA I, last echo 1/11/21 with normal systolic function, size, and thickness  # Hyperlipidemia  # CAD  # PAD  - Managed by Dr. Segovia, Cardiology  - Pending repeat fasting Lipid Profile  - Continue 81mg Aspirin, 80mg Atorvastatin, and 2.5mg Xarelto home dose    Dispo: Stable, continue inpatient pending MRI

## 2021-07-05 NOTE — CONSULTS
Neurology STROKE CODE H&P  Neurohospitalist Service, [facility]    Referring Physician: Aleksander Carrero M.D.    STROKE CODE: No chief complaint on file.      To obtain the most accurate data regarding the time called, and time patient seen, refer to the stroke run-sheet and chart.  For time of CT, refer to the radiology report. See A&P below for TPA Decision and door to needle time if and when applicable.    HPI: 55-year-old male past medical history of stroke in 2018 in the left posterior frontal region lacunar type.  No residual side effects.  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  Found to have low ejection fraction at the time.  Been seeing cardiology.  Has multivessel CAD disease.  Was recently switched from Plavix to a low-dose Xarelto.  Who presents today with left-sided upper extremity weakness.  Slowly improving.  He woke up this morning having sweats and soon afterwards noticed some weakness in the left upper extremity.  His last known well was the night before.        Review of systems: In addition to what is detailed in the HPI above, (and scanned into the chart if and when applicable), all other systems reviewed and are negative.    Past Medical History:    has a past medical history of CAD (coronary artery disease), Diabetes (HCC), Hyperlipidemia, and Hypertension.    FHx:  family history includes Diabetes in his father and mother.    SHx:   reports that he has never smoked. He has never used smokeless tobacco. He reports that he does not drink alcohol and does not use drugs.    Allergies:  No Known Allergies    Medications:  No current facility-administered medications for this encounter.    Current Outpatient Medications:   •  rivaroxaban (XARELTO) 2.5 MG tablet, Take 1 tablet by mouth 2 times a day., Disp: 180 tablet, Rfl: 2  •  glipiZIDE (GLUCOTROL) 5 MG Tab, TAKE 1 TABLET BY MOUTH TWICE DAILY FOR DIABETES, Disp: , Rfl:   •  metoprolol SR (TOPROL XL) 25 MG TABLET SR  24 HR, Take 0.5 Tabs by mouth every evening. (Patient not taking: Reported on 1/20/2021), Disp: 90 Tab, Rfl: 0  •  Empagliflozin (JARDIANCE) 10 MG Tab, Take 1 Tab by mouth every day. (Patient not taking: Reported on 1/20/2021), Disp: 90 Tab, Rfl: 3  •  ezetimibe (ZETIA) 10 MG Tab, Take 1 Tab by mouth every day. (Patient taking differently: Take 10 mg by mouth every day. Takes 1/2 tablet every other day), Disp: 90 Tab, Rfl: 3  •  atorvastatin (LIPITOR) 80 MG tablet, Take 1 Tab by mouth every day. (Patient taking differently: Take 40 mg by mouth every day.), Disp: 90 Tab, Rfl: 3  •  aspirin 81 MG EC tablet, Take 1 Tab by mouth every day., Disp: 30 Tab, Rfl: 3  •  metformin (GLUCOPHAGE) 1000 MG tablet, Take 1 Tab by mouth 2 times a day, with meals. (Patient taking differently: Take 500 mg by mouth 2 times a day, with meals.), Disp: 60 Tab, Rfl: 2    Physical Examination:    There were no vitals filed for this visit.    General: Patient is awake and in no acute distress  Eyes: examination of optic disks no pedal edema  CV: RRR    NEUROLOGICAL EXAM:     Mental status: Awake, alert and fully oriented, follows commands  Speech and language: speech is clear and fluent. The patient is able to name and repeat.  Cranial nerve exam: Pupils are equal, round and reactive to light bilaterally. Visual fields are full. Extraocular muscles are intact. Sensation in the face is intact to light touch. Face is symmetric. Hearing to finger rub equal. Palate elevates symmetrically. Shoulder shrug is full. Tongue is midline.  Motor exam: 5-5 except for the left upper extremity has decreased facilitation and he has a left lower amputation.    Sensory exam: No sensory deficits identified   Deep tendon reflexes:  2+ and symmetric. Toes down-going bilaterally.  Coordination: no ataxia   Gait: Normal    NIH Stroke Scale:    1a. Level of Consciousness (Alert, drowsy, etc): 0= Alert    1b. LOC Questions (Month, age): 0= Answers both  correctly    1c. LOC Commands (Open/close eyes make fist/let go): 0= Obeys both correctly    2.   Best Gaze (Eyes open - patient follows examiner's finger on face): 0= Normal    3.   Visual Fields (introduce visual stimulus/threat to patient's field quadrants): 0= No visual loss  4.   Facial Paresis (Show teeth, raise eyebrows and squeeze eyes shut): 0= Normal     5a. Motor Arm - Left (Elevate arm to 90 degrees if patient is sitting, 45 degrees if  supine): 0= No drift    5b. Motor Arm - Right (Elevate arm to 90 degrees if patient is sitting, 45 degrees if supine): 0= No drift    6a. Motor Leg - Left (Elevate leg 30 degrees with patient supine): X=Untestable (Joint fusion or limb amp)    6b. Motor Leg - Right  (Elevate leg 30 degrees with patient supine): 0= No drift    7.   Limb Ataxia (Finger-nose, heel down shin): 0= No ataxia    8.   Sensory (Pin prick to face, arm, trunk and leg - compare side to side): 0= Normal    9.  Best Language (Name item, describe a picture and read sentences): 0= No aphasia    10. Dysarthria (Evaluate speech clarity by patient repeating listed words): 0= Normal articulation    11. Extinction and Inattention (Use information from prior testing to identify neglect or  double simultaneous stimuli testing): 0= No neglect    Total NIH Score: 0    Modified [place] Scale (MRS): 0 = No symptoms      Objective Data:    Labs:  Lab Results   Component Value Date/Time    PROTHROMBTM 16.0 (H) 12/16/2019 04:14 PM    INR 1.25 (H) 12/16/2019 04:14 PM      Lab Results   Component Value Date/Time    WBC 4.5 (L) 03/18/2020 10:50 AM    RBC 4.26 (L) 03/18/2020 10:50 AM    HEMOGLOBIN 12.6 (L) 03/18/2020 10:50 AM    HEMATOCRIT 38.9 (L) 03/18/2020 10:50 AM    MCV 91.3 03/18/2020 10:50 AM    MCH 29.6 03/18/2020 10:50 AM    MCHC 32.4 (L) 03/18/2020 10:50 AM    MPV 10.5 03/18/2020 10:50 AM    NEUTSPOLYS 41.10 (L) 03/18/2020 10:50 AM    LYMPHOCYTES 50.20 (H) 03/18/2020 10:50 AM    MONOCYTES 6.90 03/18/2020 10:50 AM     EOSINOPHILS 0.90 03/18/2020 10:50 AM    BASOPHILS 0.70 03/18/2020 10:50 AM    ANISOCYTOSIS 1+ 12/20/2019 03:00 AM      Lab Results   Component Value Date/Time    SODIUM 142 07/01/2021 05:33 AM    SODIUM 143 01/05/2021 07:20 AM    POTASSIUM 5.0 07/01/2021 05:33 AM    POTASSIUM 4.8 01/05/2021 07:20 AM    CHLORIDE 103 07/01/2021 05:33 AM    CHLORIDE 106 01/05/2021 07:20 AM    CO2 27 07/01/2021 05:33 AM    CO2 30 01/05/2021 07:20 AM    GLUCOSE 95 07/01/2021 05:33 AM    GLUCOSE 158 (H) 01/05/2021 07:20 AM    BUN 12 07/01/2021 05:33 AM    BUN 9 01/05/2021 07:20 AM    CREATININE 0.86 07/01/2021 05:33 AM    CREATININE 0.81 01/05/2021 07:20 AM    CREATININE 0.9 05/09/2007 01:20 AM    BUNCREATRAT 14 07/01/2021 05:33 AM      Lab Results   Component Value Date/Time    CHOLSTRLTOT 197 07/01/2021 05:33 AM    CHOLSTRLTOT 262 (H) 06/15/2018 10:20 PM     (H) 06/15/2018 10:20 PM    HDL 46 07/01/2021 05:33 AM    HDL 54 06/15/2018 10:20 PM    TRIGLYCERIDE 140 07/01/2021 05:33 AM    TRIGLYCERIDE 132 06/15/2018 10:20 PM       Lab Results   Component Value Date/Time    ALKPHOSPHAT 84 07/01/2021 05:33 AM    ALKPHOSPHAT 78 01/05/2021 07:20 AM    ASTSGOT 15 07/01/2021 05:33 AM    ASTSGOT 19 01/05/2021 07:20 AM    ALTSGPT 12 07/01/2021 05:33 AM    ALTSGPT 22 01/05/2021 07:20 AM    TBILIRUBIN 0.3 07/01/2021 05:33 AM    TBILIRUBIN 0.3 01/05/2021 07:20 AM        Imaging/Testing:  CT-CEREBRAL PERFUSION ANALYSIS   Final Result      1.  Cerebral blood flow less than 30% likely representing completed infarct = 0 mL.      2.  T Max more than 6 seconds likely representing combination of completed infarct and ischemia = 0 mL.      3.  Mismatched volume likely representing ischemic brain/penumbra = None      4.  Please note that the cerebral perfusion was performed on the limited brain tissue around the basal ganglia region. Infarct/ischemia outside the CT perfusion sections can be missed in this study.      CT-CTA HEAD WITH & W/O-POST PROCESS    Final Result      1.  No large vessel occlusion or aneurysm is identified.      CT-HEAD W/O   Final Result      1.  No acute intracranial findings.      2.  Periventricular white matter changes, consistent with chronic small vessel         DX-CHEST-PORTABLE (1 VIEW)    (Results Pending)   CT-CTA NECK WITH & W/O-POST PROCESSING    (Results Pending)         Assessment and Plan:    55-year-old male presents with left upper extremity weakness.  Baseline left lower extremity amputation.  History of a stroke 2018 in the left posterior frontal with no visual side effects.  History of left lower extremity irritation after osteomyelitis.  Recently was switched from Plavix to low-dose Xarelto 2-1/2 mg per cardiology.  Has heart failure with CAD.  Not a TPA candidate due to outside window.  Also stroke scale currently is 0.  Currently on dual therapy with Xarelto and aspirin however I am not sure he is taking Xarelto twice a day, he says been taking it once a day.  Did not take this morning's dose.        Plan:  1.  Okay to continue his home medication of Xarelto 2.5 mg twice daily starting now along with 81 mg aspirin daily  2.  MRI brain  3.  Telemetry monitoring  4.  TTE  5.  Lipid panel continue Lipitor for an LDL below 70  6.  Glucose per primary recommendations normoglycemia  7.  PT/OT/speech  8.  Okay to allow permissive hypertension for the next day, long-term should be normotensive          The evaluation of the patient, and recommended management, was discussed with the resident staff.     This chart was partially generated using voice recognition technology and sound alike word replacement may be present, best efforts were made to make the chart accurate.    Jude Haynes MD  Board Certified Neurology, ABPN  (t) [phone]

## 2021-07-05 NOTE — ED TRIAGE NOTES
Israel Aviles  55 y.o. male  Chief Complaint   Patient presents with   • Possible Stroke     Pt to Ed as Stroke pre-alert. Pre-alert initiated 1234 today. Pt door-time 1241. Pt LKW 0930. Pt ineligable for TPA. BP at arrival 146/88, . Pt has hx of xeralto. Symptomatic presentations include LUE weakness with resolution upon arrival.       Pt ambulatory to  6 for above complaint.   Pt is alert and oriented, speaking in full sentences, follows commands and responds appropriately to questions. Resp are even and unlabored. No behavioral indicators of pain.   Pt encouraged to alert staff for any changes. This RN masked and in appropriate PPE during encounter.

## 2021-07-05 NOTE — H&P
History & Physical Note    Date of Admission: 7/5/2021  Admission Status: Observation-Outpatient  [facility] Team: [facility] Team  Attending: MARITZA Peters M.D.   Senior Resident: Dr. Anmol Miles M.D.  Intern: Dr. Angelita Alicia M.D.  Contact Number: [phone]    Chief Complaint: left upper extremity and left lower extremity weakness     History of Present Illness (HPI):  Israel is a 55 y.o. male who presented 7/5/2021 with left sided weakness onset 9:30 AM. He noticed his arm weakness when brushing his teeth. He also endorses left arm numbness which he noticed while trying to wash his hands as well as right leg weakness. He was so weak he almost fell two times.  He denies facial droop, visual changes, chest pain, palpitations, shortness of breath, nausea, vomiting, and headache. He says his symptoms have improved drastically since onset. He had a stroke in 2018 where he experienced left sided facial droop. After the stroke, he was put on Plavix. He recently was taken off of the Plavix and put on Xarelto. About a month ago, he stopped using aspirin as prescribed because he thought his blood was too thin, and he would take aspirin every 2-3 days. He has a history of diabetes. He had a severe infection on the bone in his left leg in 2020 which led to a below the knee amputation.    Review of Systems:   Review of Systems   Constitutional: Negative for chills, fever and weight loss.   HENT: Negative for hearing loss.    Eyes: Negative for blurred vision and double vision.   Respiratory: Negative for cough.    Cardiovascular: Negative for chest pain, palpitations, orthopnea and leg swelling.   Gastrointestinal: Negative for heartburn, nausea and vomiting.   Musculoskeletal: Negative for joint pain.   Neurological: Negative for dizziness, sensory change, speech change, focal weakness, weakness and headaches.       Past Medical History:   Past Medical History was reviewed with patient.   has a past medical  history of CAD (coronary artery disease), Diabetes (HCC), Hyperlipidemia, and Hypertension.    Past Surgical History: Past Surgical History was reviewed with patient.   has a past surgical history that includes irrigation & debridement ortho (Left, 6/24/2019); knee amputation below (Left, 12/20/2019); and zzz cardiac cath (12/16/2019).    Medications: Medications have been reviewed with patient.  Prior to Admission Medications   Prescriptions Last Dose Informant Patient Reported? Taking?   atorvastatin (LIPITOR) 80 MG tablet 7/4/2021 at 0800  No No   Sig: Take 1 Tab by mouth every day.   ezetimibe (ZETIA) 10 MG Tab 7/4/2021 at 0800  No No   Sig: Take 1 Tab by mouth every day.   metFORMIN (GLUCOPHAGE) 500 MG Tab 7/4/2021 at 1900  Yes Yes   Sig: Take 500 mg by mouth 2 times a day.   metoprolol SR (TOPROL XL) 25 MG TABLET SR 24 HR 7/4/2021 at 0800  No No   Sig: Take 0.5 Tabs by mouth every evening.   rivaroxaban (XARELTO) 2.5 MG tablet 7/4/2021 at 1900  No No   Sig: Take 1 tablet by mouth 2 times a day.      Facility-Administered Medications: None        Allergies: Allergies have been reviewed with patient.  No Known Allergies    Family History:   family history includes Diabetes in his father and mother.     Social History:   Tobacco: Never   Alcohol: Never   Recreational drugs (illegal and prescription):  Never   Primary Care Provider: reviewed Donny Mcnally M.D.  Other (stressors, spirituality, exposures):  None    Vitals:  Temp:  [35.9 °C (96.7 °F)-36.5 °C (97.7 °F)] 35.9 °C (96.7 °F)  Pulse:  [75-82] 75  Resp:  [16] 16  BP: (150-160)/(88-91) 150/88  SpO2:  [92 %-98 %] 98 %    Physical Exam  Constitutional:       Appearance: Normal appearance. He is normal weight.   HENT:      Mouth/Throat:      Mouth: Mucous membranes are moist.      Pharynx: Oropharynx is clear.   Eyes:      Extraocular Movements: Extraocular movements intact.      Conjunctiva/sclera: Conjunctivae normal.      Pupils: Pupils are equal,  round, and reactive to light.   Cardiovascular:      Rate and Rhythm: Normal rate and regular rhythm.      Pulses: Normal pulses.      Heart sounds: Normal heart sounds.   Pulmonary:      Effort: Pulmonary effort is normal.      Breath sounds: Normal breath sounds.   Abdominal:      General: Abdomen is flat. Bowel sounds are normal.      Palpations: Abdomen is soft.   Musculoskeletal:         General: No swelling, tenderness, deformity or signs of injury.      Cervical back: Normal range of motion and neck supple.      Right lower leg: No edema.      Left lower leg: No edema.   Skin:     General: Skin is warm.   Neurological:      General: No focal deficit present.      Mental Status: He is alert and oriented to person, place, and time. Mental status is at baseline.   Psychiatric:         Mood and Affect: Mood normal.         Behavior: Behavior normal.         Thought Content: Thought content normal.         Judgment: Judgment normal.       Labs:   Lab Results   Component Value Date/Time    SODIUM 137 07/05/2021 02:18 PM    POTASSIUM 4.6 07/05/2021 02:18 PM    CHLORIDE 101 07/05/2021 02:18 PM    CO2 27 07/05/2021 02:18 PM    GLUCOSE 139 (H) 07/05/2021 02:18 PM    BUN 9 07/05/2021 02:18 PM    CREATININE 0.71 07/05/2021 02:18 PM    CREATININE 0.9 05/09/2007 01:20 AM    BUNCREATRAT 14 07/01/2021 05:33 AM      Lab Results   Component Value Date/Time    PROTHROMBTM 13.1 07/05/2021 02:18 PM    INR 1.02 07/05/2021 02:18 PM      Lab Results   Component Value Date/Time    WBC 4.4 (L) 07/05/2021 02:18 PM    RBC 4.39 (L) 07/05/2021 02:18 PM    HEMOGLOBIN 13.5 (L) 07/05/2021 02:18 PM    HEMATOCRIT 40.8 (L) 07/05/2021 02:18 PM    MCV 92.9 07/05/2021 02:18 PM    MCH 30.8 07/05/2021 02:18 PM    MCHC 33.1 (L) 07/05/2021 02:18 PM    MPV 10.6 07/05/2021 02:18 PM    NEUTSPOLYS 60.50 07/05/2021 02:18 PM    LYMPHOCYTES 31.00 07/05/2021 02:18 PM    MONOCYTES 7.10 07/05/2021 02:18 PM    EOSINOPHILS 0.70 07/05/2021 02:18 PM    BASOPHILS  0.50 07/05/2021 02:18 PM    ANISOCYTOSIS 1+ 12/20/2019 03:00 AM        EKG: Per my read, EKG seems to in NSR     Imaging:   CT HEAD 7/5/2021  IMPRESSION:  1. No acute intracranial findings.  2. Periventricular white matter changes, consistent with chronic small vessel    CTA HEAD 7/5/2021  IMPRESSION:  1. No large vessel occlusion or aneurysm is identified    CTA NECK 7/5/2021  IMPRESSION:  1. Mild atherosclerotic disease. Resulting stenosis is less than 50%    CT CEREBRAL PERFUSION ANALYSIS  IMPRESSION:  1.  Cerebral blood flow less than 30% likely representing completed infarct = 0 mL.  2. T Max more than 6 seconds likely representing combination of completed infarct and ischemia = 0 mL.  3.  Mismatched volume likely representing ischemic brain/penumbra = None  4.  Please note that the cerebral perfusion was performed on the limited brain tissue around the basal ganglia region. Infarct/ischemia outside the CT perfusion sections can be missed in this study.    CXR 7/5/2021  IMPRESSION:  1. No acute cardiopulmonary findings.    Previous Data Review: reviewed    Assessment and Plan:  Mr. Aviles is a pleasant 55 year old male who presents to the ER with left sided weakness The symptoms have improved drastically since onset. About a month ago, he stopped using aspirin as prescribed and would take the pill every 2-3 days.    #Transient Ischemic Stroke  - Order TTE, MRI brain, telemetry monitoring  - Check for ESR and CRP labs to assess for any auto-immune vasculitis  - Place PT/OT orders for strength exercises  - Speech eval for swallow study in the setting of a TIA  - Neuro checks Q4H    #Hyperlipidemia  - Check lipid panel  - Continue home atorvastatin and ezetimibe    #Hyperglycemia  - Place patient on sliding scale insulin  - Continue home metformin and glipizide    #Essential Hypertension  - Hold home anti-hypertensive medications to allow for permissive hypertension in the setting of a stroke      No new Assessment  & Plan notes have been filed under this hospital service since the last note was generated.  Service: Hospital Medicine

## 2021-07-05 NOTE — ED NOTES
Med rec updated and complete. Allergies reviewed . Met with pt at bedside. Pt denies antibiotic use in last 14 days.    Current rivaroxaban dose is 2.5 mg BID. Last dose 07/04/21.    Home pharmacy [facility] [phone]         Current Outpatient Medications on File Prior to Encounter   Medication Sig Dispense Refill   • metFORMIN (GLUCOPHAGE) 500 MG Tab Take 500 mg by mouth 2 times a day.     • rivaroxaban (XARELTO) 2.5 MG tablet Take 1 tablet by mouth 2 times a day. 180 tablet 2          • metoprolol SR (TOPROL XL) 25 MG TABLET SR 24 HR Take 0.5 Tabs by mouth every evening. 90 Tab 0   • ezetimibe (ZETIA) 10 MG Tab Take 1 Tab by mouth every day. 90 Tab 3          • atorvastatin (LIPITOR) 80 MG tablet Take 1 Tab by mouth every day. 90 Tab 3   • [DISCONTINUED] aspirin 81 MG EC tablet Take 1 Tab by mouth every day. 30 Tab 3

## 2021-07-05 NOTE — PROGRESS NOTES
4 Eyes Skin Assessment Completed by River RN and KEENA López.    Head WDL  Ears WDL  Nose WDL  Mouth WDL  Neck WDL  Breast/Chest WDL  Shoulder Blades WDL  Spine WDL  (R) Arm/Elbow/Hand WDL  (L) Arm/Elbow/Hand WDL   Abdomen WDL  Groin WDL  Scrotum/Coccyx/Buttocks WDL  (R) Leg WDL  (L) Leg Blanching and Scar  (R) Heel/Foot/Toe WDL  (L) Heel/Foot/Toe BKA          Devices In Places NONE      Interventions In Place Pillows    Possible Skin Injury No    Pictures Uploaded Into Epic N/A  Wound Consult Placed N/A  RN Wound Prevention Protocol Ordered No

## 2021-07-06 ENCOUNTER — APPOINTMENT (OUTPATIENT)
Dept: CARDIOLOGY | Facility: MEDICAL CENTER | Age: 56
End: 2021-07-06
Payer: COMMERCIAL

## 2021-07-06 ENCOUNTER — NON-PROVIDER VISIT (OUTPATIENT)
Dept: CARDIOLOGY | Facility: MEDICAL CENTER | Age: 56
End: 2021-07-06
Payer: COMMERCIAL

## 2021-07-06 VITALS
TEMPERATURE: 97.1 F | WEIGHT: 171 LBS | HEART RATE: 80 BPM | SYSTOLIC BLOOD PRESSURE: 130 MMHG | DIASTOLIC BLOOD PRESSURE: 89 MMHG | OXYGEN SATURATION: 99 % | HEIGHT: 72 IN | BODY MASS INDEX: 23.16 KG/M2 | RESPIRATION RATE: 18 BRPM

## 2021-07-06 DIAGNOSIS — I47.10 SVT (SUPRAVENTRICULAR TACHYCARDIA) (HCC): ICD-10-CM

## 2021-07-06 DIAGNOSIS — I63.9 ACUTE ISCHEMIC STROKE (HCC): ICD-10-CM

## 2021-07-06 DIAGNOSIS — R00.2 PALPITATIONS: ICD-10-CM

## 2021-07-06 LAB
ALBUMIN SERPL BCP-MCNC: 3.8 G/DL (ref 3.2–4.9)
ALBUMIN/GLOB SERPL: 1.3 G/DL
ALP SERPL-CCNC: 70 U/L (ref 30–99)
ALT SERPL-CCNC: 12 U/L (ref 2–50)
ANION GAP SERPL CALC-SCNC: 9 MMOL/L (ref 7–16)
AST SERPL-CCNC: 21 U/L (ref 12–45)
BASOPHILS # BLD AUTO: 0.5 % (ref 0–1.8)
BASOPHILS # BLD: 0.02 K/UL (ref 0–0.12)
BILIRUB SERPL-MCNC: 0.3 MG/DL (ref 0.1–1.5)
BUN SERPL-MCNC: 10 MG/DL (ref 8–22)
CALCIUM SERPL-MCNC: 8.8 MG/DL (ref 8.5–10.5)
CHLORIDE SERPL-SCNC: 103 MMOL/L (ref 96–112)
CHOLEST SERPL-MCNC: 189 MG/DL (ref 100–199)
CO2 SERPL-SCNC: 26 MMOL/L (ref 20–33)
CREAT SERPL-MCNC: 0.72 MG/DL (ref 0.5–1.4)
CRP SERPL HS-MCNC: <0.3 MG/DL (ref 0–0.75)
EOSINOPHIL # BLD AUTO: 0.05 K/UL (ref 0–0.51)
EOSINOPHIL NFR BLD: 1.2 % (ref 0–6.9)
ERYTHROCYTE [DISTWIDTH] IN BLOOD BY AUTOMATED COUNT: 43.1 FL (ref 35.9–50)
ERYTHROCYTE [SEDIMENTATION RATE] IN BLOOD BY WESTERGREN METHOD: 10 MM/HOUR (ref 0–20)
GLOBULIN SER CALC-MCNC: 2.9 G/DL (ref 1.9–3.5)
GLUCOSE BLD-MCNC: 159 MG/DL (ref 65–99)
GLUCOSE BLD-MCNC: 175 MG/DL (ref 65–99)
GLUCOSE BLD-MCNC: 258 MG/DL (ref 65–99)
GLUCOSE SERPL-MCNC: 154 MG/DL (ref 65–99)
HCT VFR BLD AUTO: 40.2 % (ref 42–52)
HDLC SERPL-MCNC: 41 MG/DL
HGB BLD-MCNC: 13.6 G/DL (ref 14–18)
IMM GRANULOCYTES # BLD AUTO: 0 K/UL (ref 0–0.11)
IMM GRANULOCYTES NFR BLD AUTO: 0 % (ref 0–0.9)
LDLC SERPL CALC-MCNC: 115 MG/DL
LV EJECT FRACT  99904: 55
LV EJECT FRACT MOD 2C 99903: 66.6
LV EJECT FRACT MOD 4C 99902: 52.37
LV EJECT FRACT MOD BP 99901: 59.79
LYMPHOCYTES # BLD AUTO: 2 K/UL (ref 1–4.8)
LYMPHOCYTES NFR BLD: 47.1 % (ref 22–41)
MAGNESIUM SERPL-MCNC: 2 MG/DL (ref 1.5–2.5)
MCH RBC QN AUTO: 30.8 PG (ref 27–33)
MCHC RBC AUTO-ENTMCNC: 33.8 G/DL (ref 33.7–35.3)
MCV RBC AUTO: 91.2 FL (ref 81.4–97.8)
MONOCYTES # BLD AUTO: 0.32 K/UL (ref 0–0.85)
MONOCYTES NFR BLD AUTO: 7.5 % (ref 0–13.4)
NEUTROPHILS # BLD AUTO: 1.86 K/UL (ref 1.82–7.42)
NEUTROPHILS NFR BLD: 43.7 % (ref 44–72)
NRBC # BLD AUTO: 0 K/UL
NRBC BLD-RTO: 0 /100 WBC
PLATELET # BLD AUTO: 148 K/UL (ref 164–446)
PMV BLD AUTO: 10.5 FL (ref 9–12.9)
POTASSIUM SERPL-SCNC: 4.1 MMOL/L (ref 3.6–5.5)
PROT SERPL-MCNC: 6.7 G/DL (ref 6–8.2)
RBC # BLD AUTO: 4.41 M/UL (ref 4.7–6.1)
SODIUM SERPL-SCNC: 138 MMOL/L (ref 135–145)
TRIGL SERPL-MCNC: 164 MG/DL (ref 0–149)
WBC # BLD AUTO: 4.3 K/UL (ref 4.8–10.8)

## 2021-07-06 PROCEDURE — 700102 HCHG RX REV CODE 250 W/ 637 OVERRIDE(OP): Performed by: STUDENT IN AN ORGANIZED HEALTH CARE EDUCATION/TRAINING PROGRAM

## 2021-07-06 PROCEDURE — 96372 THER/PROPH/DIAG INJ SC/IM: CPT

## 2021-07-06 PROCEDURE — 36415 COLL VENOUS BLD VENIPUNCTURE: CPT

## 2021-07-06 PROCEDURE — A9270 NON-COVERED ITEM OR SERVICE: HCPCS | Performed by: PSYCHIATRY & NEUROLOGY

## 2021-07-06 PROCEDURE — 80061 LIPID PANEL: CPT

## 2021-07-06 PROCEDURE — 97165 OT EVAL LOW COMPLEX 30 MIN: CPT

## 2021-07-06 PROCEDURE — 700102 HCHG RX REV CODE 250 W/ 637 OVERRIDE(OP)

## 2021-07-06 PROCEDURE — 700102 HCHG RX REV CODE 250 W/ 637 OVERRIDE(OP): Performed by: PSYCHIATRY & NEUROLOGY

## 2021-07-06 PROCEDURE — 97161 PT EVAL LOW COMPLEX 20 MIN: CPT

## 2021-07-06 PROCEDURE — 93306 TTE W/DOPPLER COMPLETE: CPT

## 2021-07-06 PROCEDURE — 85025 COMPLETE CBC W/AUTO DIFF WBC: CPT

## 2021-07-06 PROCEDURE — 86140 C-REACTIVE PROTEIN: CPT

## 2021-07-06 PROCEDURE — 82962 GLUCOSE BLOOD TEST: CPT | Mod: 91

## 2021-07-06 PROCEDURE — A9270 NON-COVERED ITEM OR SERVICE: HCPCS | Performed by: STUDENT IN AN ORGANIZED HEALTH CARE EDUCATION/TRAINING PROGRAM

## 2021-07-06 PROCEDURE — 99213 OFFICE O/P EST LOW 20 MIN: CPT | Performed by: PSYCHIATRY & NEUROLOGY

## 2021-07-06 PROCEDURE — 93306 TTE W/DOPPLER COMPLETE: CPT | Mod: 26 | Performed by: INTERNAL MEDICINE

## 2021-07-06 PROCEDURE — 80053 COMPREHEN METABOLIC PANEL: CPT

## 2021-07-06 PROCEDURE — A9270 NON-COVERED ITEM OR SERVICE: HCPCS

## 2021-07-06 PROCEDURE — 700117 HCHG RX CONTRAST REV CODE 255

## 2021-07-06 PROCEDURE — 83735 ASSAY OF MAGNESIUM: CPT

## 2021-07-06 PROCEDURE — 99217 PR OBSERVATION CARE DISCHARGE: CPT | Mod: GC | Performed by: HOSPITALIST

## 2021-07-06 PROCEDURE — 85652 RBC SED RATE AUTOMATED: CPT

## 2021-07-06 PROCEDURE — 92610 EVALUATE SWALLOWING FUNCTION: CPT

## 2021-07-06 PROCEDURE — G0378 HOSPITAL OBSERVATION PER HR: HCPCS

## 2021-07-06 RX ORDER — CLOPIDOGREL BISULFATE 75 MG/1
75 TABLET ORAL DAILY
Qty: 30 TABLET | Refills: 0 | Status: SHIPPED | OUTPATIENT
Start: 2021-07-07 | End: 2021-07-06

## 2021-07-06 RX ORDER — ASPIRIN 81 MG/1
81 TABLET ORAL DAILY
Qty: 30 TABLET | Refills: 2 | Status: SHIPPED | OUTPATIENT
Start: 2021-07-07 | End: 2021-07-06

## 2021-07-06 RX ORDER — CLOPIDOGREL BISULFATE 75 MG/1
75 TABLET ORAL DAILY
Status: DISCONTINUED | OUTPATIENT
Start: 2021-07-06 | End: 2021-07-06 | Stop reason: HOSPADM

## 2021-07-06 RX ORDER — ASPIRIN 81 MG/1
81 TABLET ORAL DAILY
Qty: 30 TABLET | Refills: 2 | Status: SHIPPED | OUTPATIENT
Start: 2021-07-07 | End: 2023-12-08

## 2021-07-06 RX ORDER — CLOPIDOGREL BISULFATE 75 MG/1
75 TABLET ORAL DAILY
Qty: 30 TABLET | Refills: 0 | Status: ON HOLD | OUTPATIENT
Start: 2021-07-07 | End: 2022-03-11 | Stop reason: SDUPTHER

## 2021-07-06 RX ADMIN — CLOPIDOGREL BISULFATE 75 MG: 75 TABLET ORAL at 12:10

## 2021-07-06 RX ADMIN — INSULIN HUMAN 1 UNITS: 100 INJECTION, SOLUTION PARENTERAL at 17:09

## 2021-07-06 RX ADMIN — HUMAN ALBUMIN MICROSPHERES AND PERFLUTREN 3 ML: 10; .22 INJECTION, SOLUTION INTRAVENOUS at 15:17

## 2021-07-06 RX ADMIN — INSULIN HUMAN 3 UNITS: 100 INJECTION, SOLUTION PARENTERAL at 09:13

## 2021-07-06 RX ADMIN — INSULIN HUMAN 1 UNITS: 100 INJECTION, SOLUTION PARENTERAL at 12:04

## 2021-07-06 RX ADMIN — EZETIMIBE 10 MG: 10 TABLET ORAL at 05:15

## 2021-07-06 RX ADMIN — RIVAROXABAN 2.5 MG: 2.5 TABLET, FILM COATED ORAL at 05:15

## 2021-07-06 RX ADMIN — ATORVASTATIN CALCIUM 80 MG: 80 TABLET, FILM COATED ORAL at 17:11

## 2021-07-06 RX ADMIN — ASPIRIN 81 MG: 81 TABLET, COATED ORAL at 05:15

## 2021-07-06 ASSESSMENT — COGNITIVE AND FUNCTIONAL STATUS - GENERAL
CLIMB 3 TO 5 STEPS WITH RAILING: A LITTLE
SUGGESTED CMS G CODE MODIFIER DAILY ACTIVITY: CH
SUGGESTED CMS G CODE MODIFIER MOBILITY: CJ
STANDING UP FROM CHAIR USING ARMS: A LITTLE
WALKING IN HOSPITAL ROOM: A LITTLE
MOBILITY SCORE: 21
DAILY ACTIVITIY SCORE: 24

## 2021-07-06 ASSESSMENT — ACTIVITIES OF DAILY LIVING (ADL): TOILETING: INDEPENDENT

## 2021-07-06 ASSESSMENT — GAIT ASSESSMENTS
DEVIATION: ANTALGIC
DISTANCE (FEET): 400
GAIT LEVEL OF ASSIST: SUPERVISED

## 2021-07-06 NOTE — PROGRESS NOTES
Pt registration is complete in [employer] for 14 day monitoring. [employer] monitor serial #  [id number]. Insurance information uploaded with registration.

## 2021-07-06 NOTE — THERAPY
"Speech Language Pathology   Clinical Swallow Evaluation     Patient Name: Israel Aviles  AGE:  55 y.o., SEX:  male  Medical Record #: [id number]  Today's Date: 7/6/2021     Precautions  Precautions: (P) Swallow Precautions ( See Comments)  Comments: (P) L BKA    Assessment    54 y/o admitted on 7/5 for L sided weakness. PMH includes stroke in 2018, DM, hyperlipidemia, and HTN. MRI of brain on 7/5 found \"No acute intracranial process, Remote lacunar infarctions in the right daysi, bilateral cerebral deep white matter.\" CTA of neck found \"Mild atherosclerotic disease. Resulting stenosis is less than 50%\". Dx chest found \"No acute cardiopulmonary findings.\" Dx chest unremarkable.    Pt seen on this date for a clinical swallow evaluation. He endorsed no hx of dysphagia and has a R sidied facial droop s/p previous stroke in 2018. Pt consumed trials of thin liquids via cup sip and consecutive cup sip, soft solids, mixed consistencies, and solid with no overt s/sx of aspiration. Upon palpation, laryngeal elevation was complete and swallow trigger timely. Vocal quality clear following all trials. At this time, recommend continuation of regular/thin liquid diet.    Plan    recommend continuation of regular/thin liquid diet    Recommend Speech Therapy 2 times per week until therapy goals are met for the following treatments:  Dysphagia Training and Patient / Family / Caregiver Education.    Discharge Recommendations: (P) Anticipate that the patient will have no further speech therapy needs after discharge from the hospital       Objective       07/06/21 0928   Vitals   O2 (LPM) 0   O2 Delivery Device None - Room Air   Pain 0 - 10 Group   Therapist Pain Assessment Post Activity Pain Same as Prior to Activity;Nurse Notified;0   Prior Living Situation   Housing / Facility 1 Story House   Lives with - Patient's Self Care Capacity Spouse   Prior Level Of Function   Communication Within Functional Limits   Swallow Within " Functional Limits   Dentition Intact   Hearing Within Functional Limits for Evaluation   Vision Within Functional Limits for Evaluation   Patient's Primary Language English   Occupation (Pre-Hospital Vocational) Employed Full Time  (works at gas station)   Oral Motor Eval    Is Patient Able to Complete Oral Motor Eval Yes but Impaired   Labial Function   Labial Structure At Rest Minimal   Labial Vowel Production / I /, / U / Minimal   Labial Sequence / I /, / U / Minimal   Lingual Function   Lingual Protrude Within Functional Limits   Lingual Retract Within Functional Limits   Lateralization No Impairment Right;No Impairment Left   Jaw   Jaw Structure At Rest Within Functional Limits   Bite ([place]) Within Functional Limits   Laryngeal Function   Voice Quality Within Functional Limits   Excursion Upon Swallow Complete   Oral Food Presentation   Single Swallow Thin (0) Within Functional Limits   Serial Swallow Thin (0) Within Functional Limits   Soft & Bite-Sized (6) - (Dysphagia III) Within Functional Limits   Regular (7) Within Functional Limits   Tracheostomy   Tracheostomy  No   Dysphagia Strategies / Recommendations   Strategies / Interventions Recommended (Yes / No) Yes   Compensatory Strategies Single Sips / Bites   Diet / Liquid Recommendation Thin (0);Regular (7)   Medication Administration  Whole with Liquid Wash   Therapy Interventions None   Short Term Goals   Short Term Goal # 1 Pt will consume a regular/thin liquids with no overt s/sx of aspiration

## 2021-07-06 NOTE — SENIOR ADMIT NOTE
Senior Admit Note     Date of Admission: 7/5/2021  Admission Status: Observation-Outpatient  Attending: MARITZA Peters M.D.   Senior Resident: Dr. Miles  Intern: Dr. Alicia  Contact Number: [phone]    Chief Complaint:    Acute left upper extremity and left lower extremity weakness     History of Present Illness (HPI):    Israel is a 55 y.o. male who presented with left sided weakness onset 9:30 AM on 7/5/21. He noticed his arm weakness when brushing his teeth. He also had left arm numbness which he noticed while trying to wash his hands as well as left leg weakness. He denied facial droop, visual changes, speech changes, headache, chest pain, palpitations, shortness of breath, nausea, and vomiting. Symptoms resolved upon arrival to [facility] of stroke in 2018 where he experienced left sided facial droop. After the stroke, he was put on Plavix. He recently was taken off of the Plavix and put on Xarelto 1 week ago per cardiology. About a month ago, he stopped using aspirin as prescribed because he thought his blood was too thin, and he would take aspirin every 2-3 days.     Initial CT studies unremarkable and not tPA candidate   Symptoms resolved   Neurology Dr. Haynes saw patient and recommended admission for MRI, ECHO, tele monitoring, and therapy   Permissive hypertension for 48 hours or until a stroke can be ruled out, normotensive in the long-term     Past Medical History:   Past Medical History was reviewed with patient.   has a past medical history of CAD (coronary artery disease), Diabetes (HCC), Hyperlipidemia, and Hypertension.    Past Surgical History: Past Surgical History was reviewed with patient.   has a past surgical history that includes irrigation & debridement ortho (Left, 6/24/2019); knee amputation below (Left, 12/20/2019); and zzz cardiac cath (12/16/2019).    Medications: Medications have been reviewed with patient.  Prior to Admission Medications   Prescriptions Last Dose  Informant Patient Reported? Taking?   atorvastatin (LIPITOR) 80 MG tablet 7/4/2021 at 0800  No No   Sig: Take 1 Tab by mouth every day.   ezetimibe (ZETIA) 10 MG Tab 7/4/2021 at 0800  No No   Sig: Take 1 Tab by mouth every day.   metFORMIN (GLUCOPHAGE) 500 MG Tab 7/4/2021 at 1900  Yes Yes   Sig: Take 500 mg by mouth 2 times a day.   metoprolol SR (TOPROL XL) 25 MG TABLET SR 24 HR 7/4/2021 at 0800  No No   Sig: Take 0.5 Tabs by mouth every evening.   rivaroxaban (XARELTO) 2.5 MG tablet 7/4/2021 at 1900  No No   Sig: Take 1 tablet by mouth 2 times a day.      Facility-Administered Medications: None        Allergies: Allergies have been reviewed with patient.  No Known Allergies    Family History:   family history includes Diabetes in his father and mother.       Vitals:  Temp:  [35.9 °C (96.7 °F)-36.5 °C (97.7 °F)] 35.9 °C (96.7 °F)  Pulse:  [75-82] 75  Resp:  [16] 16  BP: (150-160)/(88-91) 150/88  SpO2:  [92 %-98 %] 98 %    Physical Exam  Constitutional:       General: He is not in acute distress.     Appearance: Normal appearance. He is normal weight. He is not ill-appearing, toxic-appearing or diaphoretic.   HENT:      Head: Normocephalic and atraumatic.      Nose: Nose normal.      Mouth/Throat:      Mouth: Mucous membranes are moist.   Eyes:      General: No scleral icterus.     Extraocular Movements: Extraocular movements intact.      Conjunctiva/sclera: Conjunctivae normal.      Pupils: Pupils are equal, round, and reactive to light.   Cardiovascular:      Rate and Rhythm: Normal rate and regular rhythm.      Pulses: Normal pulses.      Heart sounds: Normal heart sounds. No murmur heard.   No friction rub. No gallop.    Pulmonary:      Effort: Pulmonary effort is normal. No respiratory distress.      Breath sounds: Normal breath sounds. No wheezing, rhonchi or rales.   Abdominal:      General: Abdomen is flat. Bowel sounds are normal. There is no distension.      Palpations: Abdomen is soft.      Tenderness:  There is no abdominal tenderness. There is no guarding or rebound.   Musculoskeletal:         General: No swelling. Normal range of motion.      Cervical back: Normal range of motion and neck supple. No rigidity.      Right lower leg: No edema.      Comments: Below the knee amputation left side with prosthesis    Skin:     General: Skin is warm.   Neurological:      General: No focal deficit present.      Mental Status: He is alert and oriented to person, place, and time.      Cranial Nerves: No cranial nerve deficit.      Sensory: No sensory deficit.      Motor: No weakness.      Coordination: Coordination normal.      Gait: Gait normal.      Deep Tendon Reflexes: Reflexes normal.   Psychiatric:         Mood and Affect: Mood normal.         Behavior: Behavior normal.         Thought Content: Thought content normal.         Judgment: Judgment normal.        Labs:   Recent Labs     07/05/21  1418   WBC 4.4*   RBC 4.39*   HEMOGLOBIN 13.5*   HEMATOCRIT 40.8*   MCV 92.9   MCH 30.8   RDW 44.2   PLATELETCT 144*   MPV 10.6   NEUTSPOLYS 60.50   LYMPHOCYTES 31.00   MONOCYTES 7.10   EOSINOPHILS 0.70   BASOPHILS 0.50     Recent Labs     07/05/21  1418   SODIUM 137   POTASSIUM 4.6   CHLORIDE 101   CO2 27   GLUCOSE 139*   BUN 9         EKG: Per my read, sinus rhythm rate 83, QTc 456.     Imaging:   DX-CHEST-PORTABLE (1 VIEW)   Final Result      No acute cardiopulmonary findings.      CT-CEREBRAL PERFUSION ANALYSIS   Final Result      1.  Cerebral blood flow less than 30% likely representing completed infarct = 0 mL.      2.  T Max more than 6 seconds likely representing combination of completed infarct and ischemia = 0 mL.      3.  Mismatched volume likely representing ischemic brain/penumbra = None      4.  Please note that the cerebral perfusion was performed on the limited brain tissue around the basal ganglia region. Infarct/ischemia outside the CT perfusion sections can be missed in this study.      CT-CTA NECK WITH &  W/O-POST PROCESSING   Final Result      Mild atherosclerotic disease. Resulting stenosis is less than 50%      CT-CTA HEAD WITH & W/O-POST PROCESS   Final Result      1.  No large vessel occlusion or aneurysm is identified.      CT-HEAD W/O   Final Result      1.  No acute intracranial findings.      2.  Periventricular white matter changes, consistent with chronic small vessel         EC-ECHOCARDIOGRAM COMPLETE W/O CONT    (Results Pending)   MR-BRAIN-WITH & W/O    (Results Pending)         Previous Data Review: reviewed    Assessment and Plan:  Mr. Aviles is a pleasant 55 year old male who presents to the ER with left sided upper and lower extremity weakness   His symptoms have completely resolved upon presentation to [facility]   He reports taking his aspirin intermittently for the past month and being recently placed on Eliquis for stable CAD per cardiology  No longer on Plavix   CT non contrast neg for hemmorhage  CTA studies unremarkable, not a tPA candidate and symptoms resolved upon arrival     #Transient Ischemic Attack   -Admit to neuro tele to monitor for arhythmia   - TTE  -MRI brain w and w/o  - Check for ESR and CRP, if elevated will r/o vasculitis and AI etiologies   - PT/OT/Speech   -Fall, aspiration, and seizure precautions   - Neuro checks Q4H  -Neurology consulted, appreciate further recs   -Educated on importance of taking aspirin every day along with Eliquis BID for prevention   -Regiment going forward should be daily aspirin 81mg and Eliquis 2.5 mg BID   -Continue education on medication compliance      #Hyperlipidemia  - Check lipid panel  - Continue home atorvastatin 80 mg daily and ezetimibe  -LDL goal <70     #Diabetes Mellitus type 2   A1C 7.7% on 7/1/21, needs better glycemic control   - SSI and hypoglycemia protocol   - Continue home metformin      #Essential Hypertension  - Hold home anti-hypertensive medications to allow for permissive hypertension for 48 hours or until acute stroke is  ruled out

## 2021-07-06 NOTE — PROGRESS NOTES
Monitor summary: SR 69-87, NH 0.19, QRS 0.09, QT 0.37, with rare PVCs per strip from monitor room.

## 2021-07-06 NOTE — CARE PLAN
Problem: Knowledge Deficit - Standard  Goal: Patient and family/care givers will demonstrate understanding of plan of care, disease process/condition, diagnostic tests and medications  Outcome: Progressing  Note: Pt educated regarding safety precautions, using call light appropriately, plan of care, medications and provided emotional support as needed. All questions were answered.    The patient is Stable - Low risk of patient condition declining or worsening    Shift Goals  Clinical Goals: Stable neuro status  Patient Goals: sleep    Progress made toward(s) clinical / shift goals:  Pt remains free from falls    Patient is not progressing towards the following goals:

## 2021-07-06 NOTE — PROGRESS NOTES
Neurology Progress Note  Neurohospitalist Service, [facility]    Referring Physician: Florencio Sweet M.D.    Chief Complaint   Patient presents with   • Possible Stroke     Pt to Ed as Stroke pre-alert. Pre-alert initiated 1234 today. Pt door-time 1241. Pt LKW 0930. Pt ineligable for TPA. BP at arrival 146/88, . Pt has hx of xeralto. Symptomatic presentations include LUE weakness with resolution upon arrival.       HPI: Refer to initial documented Neurology H&P, as detailed in the patient's chart.    Interval History July 6, 2021: No new events overnight.  Patient says is back to baseline.    Review of systems: In addition to what is detailed in the HPI and/or updated in the interval history, all other systems reviewed and are negative.    Past Medical History:    has a past medical history of CAD (coronary artery disease), Diabetes (HCC), Hyperlipidemia, and Hypertension.    FHx:  family history includes Diabetes in his father and mother.    SHx:   reports that he has never smoked. He has never used smokeless tobacco. He reports that he does not drink alcohol and does not use drugs.    Medications:    Current Facility-Administered Medications:   •  [DISCONTINUED] rivaroxaban (Xarelto) tablet 2.5 mg, 2.5 mg, Oral, TWICE DAILY, 2.5 mg at 07/05/21 1542 **AND** aspirin EC (ECOTRIN) tablet 81 mg, 81 mg, Oral, DAILY, Jude Haynes M.D., 81 mg at 07/06/21 0515  •  acetaminophen (Tylenol) tablet 650 mg, 650 mg, Oral, Q6HRS PRN, Angelita Alicia M.D.  •  insulin regular (HumuLIN R,NovoLIN R) injection, 1-6 Units, Subcutaneous, 4X/DAY ACHS, 3 Units at 07/06/21 0913 **AND** POC blood glucose manual result, , , Q AC AND BEDTIME(S) **AND** NOTIFY MD and PharmD, , , Once **AND** glucose 4 g chewable tablet 16 g, 16 g, Oral, Q15 MIN PRN **AND** dextrose 50% (D50W) injection 50 mL, 50 mL, Intravenous, Q15 MIN PRN, Angelita Alicia M.D.  •  ezetimibe (ZETIA) tablet 10 mg, 10 mg, Oral, DAILY, Angelita Alicia,  M.D., 10 mg at 07/06/21 0515  •  atorvastatin (LIPITOR) tablet 80 mg, 80 mg, Oral, Q EVENING, Angelita Alicia M.D., 80 mg at 07/05/21 1757  •  rivaroxaban (Xarelto) tablet 2.5 mg, 2.5 mg, Oral, BID, Angelita Alicia M.D., 2.5 mg at 07/06/21 0515    Physical Examination:     Vitals:    07/05/21 2000 07/06/21 0000 07/06/21 0400 07/06/21 0700   BP: 138/87 115/69 120/80 127/83   Pulse: 74 76 81 82   Resp: 17 17 17 18   Temp: 36.2 °C (97.2 °F) 36.3 °C (97.3 °F) 36.3 °C (97.3 °F) 36.4 °C (97.5 °F)   TempSrc: Temporal Temporal Temporal Temporal   SpO2: 99% 93% 96% 98%   Weight:       Height:           General: Patient is awake and in no acute distress      NEUROLOGICAL EXAM:     Mental status: Awake, alert and fully oriented, follows commands  Speech and language: speech is clear and fluent. The patient is able to name and repeat.  Cranial nerve exam: Pupils are equal, round and reactive to light bilaterally. Visual fields are full. Extraocular muscles are intact. Sensation in the face is intact to light touch. Face is symmetric. Hearing intact. Palate elevates symmetrically. Shoulder shrug is full. Tongue is midline.  Motor exam: Strength is 5/5 in all extremities both distally and proximally. Tone is normal. No abnormal movements were seen on exam.  Sensory exam: No sensory deficits identified   Deep tendon reflexes:  2+ and symmetric. Toes down-going bilaterally.  Coordination: no ataxia       Objective Data:    Labs:  Lab Results   Component Value Date/Time    PROTHROMBTM 13.1 07/05/2021 02:18 PM    INR 1.02 07/05/2021 02:18 PM      Lab Results   Component Value Date/Time    WBC 4.3 (L) 07/06/2021 03:40 AM    RBC 4.41 (L) 07/06/2021 03:40 AM    HEMOGLOBIN 13.6 (L) 07/06/2021 03:40 AM    HEMATOCRIT 40.2 (L) 07/06/2021 03:40 AM    MCV 91.2 07/06/2021 03:40 AM    MCH 30.8 07/06/2021 03:40 AM    MCHC 33.8 07/06/2021 03:40 AM    MPV 10.5 07/06/2021 03:40 AM    NEUTSPOLYS 43.70 (L) 07/06/2021 03:40 AM    LYMPHOCYTES 47.10 (H)  07/06/2021 03:40 AM    MONOCYTES 7.50 07/06/2021 03:40 AM    EOSINOPHILS 1.20 07/06/2021 03:40 AM    BASOPHILS 0.50 07/06/2021 03:40 AM    ANISOCYTOSIS 1+ 12/20/2019 03:00 AM      Lab Results   Component Value Date/Time    SODIUM 138 07/06/2021 03:40 AM    POTASSIUM 4.1 07/06/2021 03:40 AM    CHLORIDE 103 07/06/2021 03:40 AM    CO2 26 07/06/2021 03:40 AM    GLUCOSE 154 (H) 07/06/2021 03:40 AM    BUN 10 07/06/2021 03:40 AM    CREATININE 0.72 07/06/2021 03:40 AM    CREATININE 0.9 05/09/2007 01:20 AM    BUNCREATRAT 14 07/01/2021 05:33 AM      Lab Results   Component Value Date/Time    CHOLSTRLTOT 189 07/06/2021 03:40 AM     (H) 07/06/2021 03:40 AM    HDL 41 07/06/2021 03:40 AM    TRIGLYCERIDE 164 (H) 07/06/2021 03:40 AM       Lab Results   Component Value Date/Time    ALKPHOSPHAT 70 07/06/2021 03:40 AM    ASTSGOT 21 07/06/2021 03:40 AM    ALTSGPT 12 07/06/2021 03:40 AM    TBILIRUBIN 0.3 07/06/2021 03:40 AM        Imaging/Testing:  MR-BRAIN-WITH & W/O   Final Result         No acute intracranial process.      Remote lacunar infarctions in the right daysi, bilateral cerebral deep white matter.      Hyperintensities in the deep white matter, most likely related to chronic microvascular ischemia.      DX-CHEST-PORTABLE (1 VIEW)   Final Result      No acute cardiopulmonary findings.      CT-CEREBRAL PERFUSION ANALYSIS   Final Result      1.  Cerebral blood flow less than 30% likely representing completed infarct = 0 mL.      2.  T Max more than 6 seconds likely representing combination of completed infarct and ischemia = 0 mL.      3.  Mismatched volume likely representing ischemic brain/penumbra = None      4.  Please note that the cerebral perfusion was performed on the limited brain tissue around the basal ganglia region. Infarct/ischemia outside the CT perfusion sections can be missed in this study.      CT-CTA NECK WITH & W/O-POST PROCESSING   Final Result      Mild atherosclerotic disease. Resulting stenosis is  less than 50%      CT-CTA HEAD WITH & W/O-POST PROCESS   Final Result      1.  No large vessel occlusion or aneurysm is identified.      CT-HEAD W/O   Final Result      1.  No acute intracranial findings.      2.  Periventricular white matter changes, consistent with chronic small vessel         EC-ECHOCARDIOGRAM COMPLETE W/O CONT    (Results Pending)         Assessment and Plan:    55-year-old male presenting with a TIA event with left-sided weakness is now resolved.  MRI brain is unremarkable.  TTE is pending however I will anticipate this to .  Patient would like to be discharged afterwards.  Needs to be on dual therapy.  He supposed be on Xarelto and aspirin however he says he cannot afford the Xarelto I informed him to follow-up with the cardiac clinic to try to get the    Plan:  1.  Continue dual therapy with Xarelto 2.5 mg twice daily and aspirin 81 mg daily.  Can consider switching to Xarelto with Plavix 75 mg daily if needed due to financial concerns  2.  Continue Lipitor 80 mg.  LDL goal should be below 70 currently 115.  Consider PSK 9 inhibitors outpatient.  3.  Blood pressure goal should be normotensive  4.  Glucose control per primary recommendations normoglycemia  5.  Follow-up in [facility]    Neurology will sign off.          The evaluation of the patient, and recommended management, was discussed with the resident staff. I have performed a physical exam and reviewed and updated ROS and Plan today (7/6/2021). In review of yesterday's note (7/5/2021), there are no changes except as documented above.    This chart was partially generated using voice recognition technology and sound alike word replacement may be present, best efforts were made to make the chart accurate.    Jude Haynes MD  Board Certified Neurology, ABPN  (t) [phone]

## 2021-07-06 NOTE — DISCHARGE SUMMARY
Discharge Summary    CHIEF COMPLAINT ON ADMISSION  Chief Complaint   Patient presents with   • Possible Stroke     Pt to Ed as Stroke pre-alert. Pre-alert initiated 1234 today. Pt door-time 1241. Pt LKW 0930. Pt ineligable for TPA. BP at arrival 146/88, . Pt has hx of xeralto. Symptomatic presentations include LUE weakness with resolution upon arrival.       Reason for Admission  STROKE     Admission Date  7/5/2021    CODE STATUS  Full Code    HPI & HOSPITAL COURSE    Manan Aviles is a 55 year old male with a h/o CAD (s/p LHC in ~2019), PAD (s/p BKA of the LLE in 2019 for Osteomyelitis), L Posterior Frontal Lobe Lacunar stroke (2018, with no residual focal neurologic deficits), DM2 (A1c = 7.7%) and HLD. He was admitted on 7/5 with L sided weakness secondary to a TIA.     Hospital Course    On admission, pt's L sided weakness was already found to have spontaneously resolved by the time we saw him. Therefore, he was simply kept in the hospital overnight for a full workup ofor secondary causes of TIA.     On discharge, pt was ambulatory without assistance, tolerating PO and making urine. He did not endorse any pain or problems moving his arms or legs. His ROS on d/c was:    -Cardio: denies chest pain, denies palpitations  -Resp: denies SOB, denies cough, denies wheezing  -Abd: denies abd pain, denies N/V    Physical Exam:  -General: NAD, resting comfortably in bed, L leg is amputated with a prosthetic limb in place   -Cardio: no murmurs or gallops  -Resp: lungs CTAB, no crackles or wheezing  -Abd: soft and nontender, no guarding or rebound  -Neuro: no FNDs. EOMI. SILVA, strength 5/5 and symmetric in upper and lower extremities bilaterally. Crude touch sensation intact in upper and lower extremities bilaterally.     Problem List:    * TIA (transient ischemic attack) - before pt presented to the hospital, he states that he had an acute onset of L sided weakness. However, by the time pt was evaluated in the ED, his  weakness was found to have spontaneously resolved. CT imaging was negative for any acute hemorrhagic stroke or any masses. Because pt's FNDs had resolved spontaneously, he was dx with a TIA and he was admitted to the hospital for workup of secondary causes of his stroke. Thankfully, his workup was completely negative. More specifically, his MRI did not show any ischemic changes, his CTA head/neck did not show any evidence of dissection or aneurysms and his carotid stenosis was < 50%, and his echo was unremarkable (with EF~55% with no wall motion abnormalities, no significant stenosis, no evidence of HOCM and no ASD). A Perfusion scan of his brain was also wnl. In addition, telemetry during his hospital stay did not reveal any evidence of arrhythmias.Troponin was also unremarkable. During his hospital stay, he was given ~24-48 hours of permissive HTN as well and serial q4hr neuro checks were benign. Therefore, pt was simply discharged with DAPT and a ZIO patch for 2 weeks to further ensure that he did not have an underlying arrhythmia. He was encouraged to f/u with Neurology as an outpatient.      HFrEF (heart failure with reduced ejection fraction) ([facility]) - pt has a h/o HFrEF with EF ~45% and some evidence of apical hypokinesis on his echo in 2019. However, his home Metoprolol was held as an inpatient to allow for permissive HTN. Thankfully, his HR remained well controlled at about ~70-80bpm. Of significance, after discussion of the risks and benefits of more aggressive anticoagulation, pt was given a choice and he elected to discontinue Rivaroxaban in favor of DAPT due cost concerns and at the advice of cardiologist Dr. Aragon. Thankfully, throughout pt's hospital stay, he never had any sx of an acute HF exacerbation and he remained on RA without any SOB or desats. Pt's home Ezetimibe and Atorvastatin were also continued.     Uncontrolled type 2 diabetes mellitus with hyperglycemia ([facility]) - pt states that his most  recent A1c was 7.7%. However, due to the risk of lactic acidosis, his home Metformin was held as in inpatient in favor of [facility]. Thankfully, his glucose remained well controlled and < 180 throughout his hospital stay.       Therefore, he is discharged in good and stable condition to home with close outpatient follow-up.    The patient recovered much more quickly than anticipated on admission.    Discharge Date  7/6/2021    FOLLOW UP ITEMS POST DISCHARGE  Please follow up with the Stroke Clinic here at [facility] for your ZIO patch. Please also follow up with your cardiologist and neurologist.     DISCHARGE DIAGNOSES  Principal Problem:    TIA (transient ischemic attack) POA: Unknown  Active Problems:    Uncontrolled type 2 diabetes mellitus with hyperglycemia (HCC) POA: Yes    Hyperlipidemia POA: Yes    PVD (peripheral vascular disease) ([facility]) POA: Yes  Resolved Problems:    * No resolved hospital problems. *      FOLLOW UP  Future Appointments   Date Time Provider Department Center   7/20/2021  9:20 AM Mahesh Segovia M.D. [facility] None     Donny Mcnally M.D.  [address] [place] [address] [id number]  [id number]  [facility] [address]  [phone]    In 1 week        MEDICATIONS ON DISCHARGE     Medication List      ASK your doctor about these medications      Instructions   atorvastatin 80 MG tablet  Commonly known as: LIPITOR   Take 1 Tab by mouth every day.  Dose: 80 mg     ezetimibe 10 MG Tabs  Commonly known as: ZETIA   Take 1 Tab by mouth every day.  Dose: 10 mg     metFORMIN 500 MG Tabs  Commonly known as: GLUCOPHAGE  Ask about: Which instructions should I use?   Take 500 mg by mouth 2 times a day.  Dose: 500 mg     metoprolol SR 25 MG Tb24  Commonly known as: TOPROL XL   Take 0.5 Tabs by mouth every evening.  Dose: 12.5 mg     rivaroxaban 2.5 MG tablet  Commonly known as: Xarelto   Take 1 tablet by mouth 2 times a day.  Dose: 2.5 mg            Allergies  No Known Allergies    DIET  Orders Placed This Encounter   Procedures   • Diet Order Diet:  Consistent CHO (Diabetic)     Standing Status:   Standing     Number of Occurrences:   1     Order Specific Question:   Diet:     Answer:   Consistent CHO (Diabetic) [4]       ACTIVITY  As tolerated.  Weight bearing as tolerated    CONSULTATIONS  Neurology    PROCEDURES  none    LABORATORY  Lab Results   Component Value Date    SODIUM 138 07/06/2021    POTASSIUM 4.1 07/06/2021    CHLORIDE 103 07/06/2021    CO2 26 07/06/2021    GLUCOSE 154 (H) 07/06/2021    BUN 10 07/06/2021    CREATININE 0.72 07/06/2021    CREATININE 0.9 05/09/2007        Lab Results   Component Value Date    WBC 4.3 (L) 07/06/2021    HEMOGLOBIN 13.6 (L) 07/06/2021    HEMATOCRIT 40.2 (L) 07/06/2021    PLATELETCT 148 (L) 07/06/2021        Total time of the discharge process exceeds 60 minutes.

## 2021-07-06 NOTE — THERAPY
Occupational Therapy   Initial Evaluation     Patient Name: Israel Aviles  Age:  55 y.o., Sex:  male  Medical Record #: [id number]  Today's Date: 7/6/2021     Precautions  Precautions: Swallow Precautions ( See Comments)  Comments: L BKA    Assessment  Patient is 55 y.o. male with a diagnosis of L UE and LE weakness.  Pt is at or near his/her functional baseline. Pt with no further skilled OT needs in the acute care setting at this time.      Plan     Occupational Therapy for Evaluation only.       Discharge Recommendations: (P) Anticipate that the patient will have no further occupational therapy needs after discharge from the hospital        07/06/21 0813   Prior Living Situation   Prior Services Home-Independent   Housing / Facility 1 Story House   Steps Into Home 1   Bathroom Set up Shower Curtain   Equipment Owned Front-Wheel Walker;Single Point Cane;Wheelchair  (does not use AD)   Lives with - Patient's Self Care Capacity Spouse   Prior Level of ADL Function   Self Feeding Independent   Grooming / Hygiene Independent   Bathing Independent   Dressing Independent   Toileting Independent   ADL Assessment   Grooming Independent   Upper Body Dressing Supervision   Lower Body Dressing Supervision   Toileting Supervision   Functional Mobility   Sit to Stand Supervised   Bed, Chair, Wheelchair Transfer Supervised

## 2021-07-07 NOTE — DISCHARGE INSTRUCTIONS
"Discharge Instructions    Discharged to home by car with relative. Discharged via wheelchair, hospital escort: Yes.  Special equipment needed: Not Applicable    Be sure to schedule a follow-up appointment with your primary care doctor or any specialists as instructed.     Discharge Plan:   Diet Plan: Discussed  Activity Level: Discussed  Confirmed Follow up Appointment: Patient to Call and Schedule Appointment  Confirmed Symptoms Management: Discussed  Medication Reconciliation Updated: Yes    I understand that a diet low in cholesterol, fat, and sodium is recommended for good health. Unless I have been given specific instructions below for another diet, I accept this instruction as my diet prescription.   Other diet: Diabetic    Special Instructions:     Stroke/CVA/TIA/Hemorrhagic Ischemia Discharge Instructions  You have had a stroke. Your risk factors have been identified as follows:  Age - Over 55  Gender - Men are at a higher risk than women  Diabetes  Previous TIAs or \"mini strokes\"  It is important that you reduce your risk factors to avoid another stroke in the future. Here are some general guidelines to follow:  · Eat healthy - avoid food high in fat.  · Get regular exercise.  · Maintain a healthy weight.  · Avoid smoking.  · Avoid alcohol and illegal drug use.  · Take your medications as directed.  For more information regarding risk factors, refer to pages 17-19 in your Stroke Patient Education Guide. Stroke Education Guide was given to patient.    Warning signs of a stroke include (which can also be found on page 3 of your Stroke Patient Education Guide):  · Sudden numbness of weakness of the face, arm or leg (especially on one side of the body).  · Sudden confusion, trouble speaking or understanding.  · Sudden trouble seeing in one or both eyes.  · Sudden trouble walking, dizziness, loss of balance or coordination.  · Sudden severe headache with no known cause.  It is very important to get treatment " quickly when a stroke occurs. If you experience any of the above warning signs, call [phone] immediately.     Some patients who have had a stroke will be going home on a blood thinner medication called Warfarin (Coumadin).  This medication requires very close monitoring and follow up.  This follow up can be provided by either your Primary Care Physician or by [facility] Outpatient Anticoagulation Service.  The Outpatient Anticoagulation Service is located at the [facility] at [facility] ([facility]).  If you do not know when your follow up appointment is scheduled, call [phone] to verify your appointment time.      · Is patient discharged on Warfarin / Coumadin?   No     Depression / Suicide Risk    As you are discharged from this [facility], it is important to learn how to keep safe from harming yourself.    Recognize the warning signs:  · Abrupt changes in personality, positive or negative- including increase in energy   · Giving away possessions  · Change in eating patterns- significant weight changes-  positive or negative  · Change in sleeping patterns- unable to sleep or sleeping all the time   · Unwillingness or inability to communicate  · Depression  · Unusual sadness, discouragement and loneliness  · Talk of wanting to die  · Neglect of personal appearance   · Rebelliousness- reckless behavior  · Withdrawal from people/activities they love  · Confusion- inability to concentrate     If you or a loved one observes any of these behaviors or has concerns about self-harm, here's what you can do:  · Talk about it- your feelings and reasons for harming yourself  · Remove any means that you might use to hurt yourself (examples: pills, rope, extension cords, firearm)  · Get professional help from the community (Mental Health, Substance Abuse, psychological counseling)  · Do not be alone:Call your Safe Contact- someone whom you trust who will be there  "for you.  · Call your local CRISIS HOTLINE 218-6968 or 779-091-2740  · Call your local Children's Mobile Crisis Response Team [place] (810) 683-4508 or www.CompassMD  · Call the toll free National Suicide Prevention Hotlines   · National Suicide Prevention Lifeline 526-808-YCBY (2820)  · [facility] Line Network 800-SUICIDE (112-3199)    Follow up with neurologist and cardiologist. Wear ZIO patch and follow up with Stroke Clinic here at [facility]    Transient Ischemic Attack    A transient ischemic attack (TIA) is a \"warning stroke\" that causes stroke-like symptoms that go away quickly. A TIA does not cause lasting damage to the brain. But having a TIA is a sign that you may be at risk for a stroke. Lifestyle changes and medical treatments can help prevent a stroke.  It is important to know the symptoms of a TIA and what to do. Get help right away, even if your symptoms go away. The symptoms of a TIA are the same as those of a stroke. They can happen fast, and they usually go away within minutes or hours. They can include:  · Weakness or loss of feeling in your face, arm, or leg. This often happens on one side of your body.  · Trouble walking.  · Trouble moving your arms or legs.  · Trouble talking or understanding what people are saying.  · Trouble seeing.  · Seeing two of one object (double vision).  · Feeling dizzy.  · Feeling confused.  · Loss of balance or coordination.  · Feeling sick to your stomach (nauseous) and throwing up (vomiting).  · A very bad headache for no reason.  What increases the risk?  Certain things may make you more likely to have a TIA. Some of these are things that you can change, such as:  · Being very overweight (obese).  · Using products that contain nicotine or tobacco, such as cigarettes and e-cigarettes.  · Taking birth control pills.  · Not being active.  · Drinking too much alcohol.  · Using drugs.  Other risk factors include:  · Having an irregular heartbeat (atrial " fibrillation).  · Being  or .  · Having had blood clots, stroke, TIA, or heart attack in the past.  · Being a woman with a history of high blood pressure in pregnancy (preeclampsia).  · Being over the age of 60.  · Being male.  · Having family history of stroke.  · Having the following diseases or conditions:  ? High blood pressure.  ? High cholesterol.  ? Diabetes.  ? Heart disease.  ? Sickle cell disease.  ? Sleep apnea.  ? Migraine headache.  ? Long-term (chronic) diseases that cause soreness and swelling (inflammation).  ? Disorders that affect how your blood clots.  Follow these instructions at home:  Medicines    · Take over-the-counter and prescription medicines only as told by your doctor.  · If you were told to take aspirin or another medicine to thin your blood, take it exactly as told by your doctor.  ? Taking too much of the medicine can cause bleeding.  ? Taking too little of the medicine may not work to treat the problem.  Eating and drinking    · Eat 5 or more servings of fruits and vegetables each day.  · Follow instructions from your doctor about your diet. You may need to follow a certain diet to help lower your risk of having a stroke. You may need to:  ? Eat a diet that is low in fat and salt.  ? Eat foods that contain a lot of fiber.  ? Limit the amount of carbohydrates and sugar in your diet.  · Limit alcohol intake to 1 drink a day for nonpregnant women and 2 drinks a day for men. One drink equals 12 oz of beer, 5 oz of wine, or 1½ oz of hard liquor.  General instructions  · Keep a healthy weight.  · Stay active. Try to get at least 30 minutes of activity on all or most days.  · Find out if you have a condition called sleep apnea. Get treatment if needed.  · Do not use any products that contain nicotine or tobacco, such as cigarettes and e-cigarettes. If you need help quitting, ask your doctor.  · Do not abuse drugs.  · Keep all follow-up visits as told by your doctor.  "This is important.  Get help right away if:  · You have any signs of stroke. \"BE FAST\" is an easy way to remember the main warning signs:  ? B - Balance. Signs are dizziness, sudden trouble walking, or loss of balance.  ? E - Eyes. Signs are trouble seeing or a sudden change in how you see.  ? F - Face. Signs are sudden weakness or loss of feeling of the face, or the face or eyelid drooping on one side.  ? A - Arms. Signs are weakness or loss of feeling in an arm. This happens suddenly and usually on one side of the body.  ? S - Speech. Signs are sudden trouble speaking, slurred speech, or trouble understanding what people say.  ? T - Time. Time to call emergency services. Write down what time symptoms started.  · You have other signs of stroke, such as:  ? A sudden, very bad headache with no known cause.  ? Feeling sick to your stomach (nausea).  ? Throwing up (vomiting).  ? Jerky movements that you cannot control (seizure).  These symptoms may be an emergency. Do not wait to see if the symptoms will go away. Get medical help right away. Call your local emergency services (911 in the [place] Do not drive yourself to the hospital.  Summary  · A transient ischemic attack (TIA) is a \"warning stroke\" that causes stroke-like symptoms that go away quickly.  · A TIA is a medical emergency. Get help right away, even if your symptoms go away.  · A TIA does not cause lasting damage to the brain.  · Having a TIA is a sign that you may be at risk for a stroke. Lifestyle changes and medical treatments can help prevent a stroke.  This information is not intended to replace advice given to you by your health care provider. Make sure you discuss any questions you have with your health care provider.  Document Released: 09/26/2009 Document Revised: 09/13/2019 Document Reviewed: 03/21/2018  Elsevier Patient Education © 2020 [employer].        "

## 2021-07-07 NOTE — PROGRESS NOTES
Patient given discharge instructions and education prior to leaving. Education and application of ZIO patch has been done prior to discharge. Patient has all of his belongings and is being escorted to his car via wheelchair by staff and his wife. Patient has no further needs at this point.

## 2021-07-13 ENCOUNTER — TELEPHONE (OUTPATIENT)
Dept: NEUROLOGY | Facility: MEDICAL CENTER | Age: 56
End: 2021-07-13

## 2021-07-13 NOTE — TELEPHONE ENCOUNTER
Reviewed Zio transmission from 7/12/2021    [facility]       Lara Sultana, MSN, FNP  Nurse Practitioner  [facility]   [phone] Phone  [phone] Fax

## 2021-07-19 ENCOUNTER — OFFICE VISIT (OUTPATIENT)
Dept: NEUROLOGY | Facility: MEDICAL CENTER | Age: 56
End: 2021-07-19
Attending: NURSE PRACTITIONER
Payer: COMMERCIAL

## 2021-07-19 VITALS
HEIGHT: 72 IN | SYSTOLIC BLOOD PRESSURE: 112 MMHG | RESPIRATION RATE: 12 BRPM | WEIGHT: 169.97 LBS | HEART RATE: 93 BPM | TEMPERATURE: 97.7 F | OXYGEN SATURATION: 98 % | DIASTOLIC BLOOD PRESSURE: 70 MMHG | BODY MASS INDEX: 23.02 KG/M2

## 2021-07-19 DIAGNOSIS — E78.2 MIXED HYPERLIPIDEMIA: ICD-10-CM

## 2021-07-19 DIAGNOSIS — E11.69 TYPE 2 DIABETES MELLITUS WITH OTHER SPECIFIED COMPLICATION, WITHOUT LONG-TERM CURRENT USE OF INSULIN (HCC): ICD-10-CM

## 2021-07-19 DIAGNOSIS — I25.5 ISCHEMIC CARDIOMYOPATHY: ICD-10-CM

## 2021-07-19 DIAGNOSIS — G45.9 TIA (TRANSIENT ISCHEMIC ATTACK): ICD-10-CM

## 2021-07-19 PROCEDURE — 99215 OFFICE O/P EST HI 40 MIN: CPT | Performed by: NURSE PRACTITIONER

## 2021-07-19 PROCEDURE — 99417 PROLNG OP E/M EACH 15 MIN: CPT | Performed by: NURSE PRACTITIONER

## 2021-07-19 PROCEDURE — 99212 OFFICE O/P EST SF 10 MIN: CPT | Performed by: NURSE PRACTITIONER

## 2021-07-19 RX ORDER — GLIPIZIDE 5 MG/1
5 TABLET ORAL 2 TIMES DAILY
COMMUNITY
End: 2021-10-22 | Stop reason: SDUPTHER

## 2021-07-19 ASSESSMENT — ENCOUNTER SYMPTOMS
FEVER: 0
HEARTBURN: 0
NERVOUS/ANXIOUS: 0
NAUSEA: 0
VOMITING: 0
COUGH: 0
SPEECH CHANGE: 0
NECK PAIN: 0
SENSORY CHANGE: 0
BLURRED VISION: 0
WEAKNESS: 0
FOCAL WEAKNESS: 0
BACK PAIN: 0
HEADACHES: 0
DEPRESSION: 0
FALLS: 0

## 2021-07-19 ASSESSMENT — PATIENT HEALTH QUESTIONNAIRE - PHQ9: CLINICAL INTERPRETATION OF PHQ2 SCORE: 0

## 2021-07-19 ASSESSMENT — FIBROSIS 4 INDEX: FIB4 SCORE: 2.25

## 2021-07-19 NOTE — PATIENT INSTRUCTIONS
Stroke Prevention  Some medical conditions and lifestyle choices can lead to a higher risk for a stroke. You can help to prevent a stroke by making nutrition, lifestyle, and other changes.  What nutrition changes can be made?    · Eat healthy foods.  ? Choose foods that are high in fiber. These include:  § Fresh fruits.  § Fresh vegetables.  § Whole grains.  ? Eat at least 5 or more servings of fruits and vegetables each day. Try to fill half of your plate at each meal with fruits and vegetables.  ? Choose lean protein foods. These include:  § Lowfat (lean) cuts of meat.  § Chicken without skin.  § Fish.  § Tofu.  § Beans.  § Nuts.  ? Eat low-fat dairy products.  ? Avoid foods that:  § Are high in salt (sodium).  § Have saturated fat.  § Have trans fat.  § Have cholesterol.  § Are processed.  § Are premade.  · Follow eating guidelines as told by your doctor. These may include:  ? Reducing how many calories you eat and drink each day.  ? Limiting how much salt you eat or drink each day to 1,500 milligrams (mg).  ? Using only healthy fats for cooking. These include:  § Olive oil.  § Canola oil.  § Sunflower oil.  ? Counting how many carbohydrates you eat and drink each day.  What lifestyle changes can be made?  · Try to stay at a healthy weight. Talk to your doctor about what a good weight is for you.  · Get at least 30 minutes of moderate physical activity at least 5 days a week. This can include:  ? Fast walking.  ? Biking.  ? Swimming.  · Do not use any products that have nicotine or tobacco. This includes cigarettes and e-cigarettes. If you need help quitting, ask your doctor. Avoid being around tobacco smoke in general.  · Limit how much alcohol you drink to no more than 1 drink a day for nonpregnant women and 2 drinks a day for men. One drink equals 12 oz of beer, 5 oz of wine, or 1½ oz of hard liquor.  · Do not use drugs.  · Avoid taking birth control pills. Talk to your doctor about the risks of  "taking birth control pills if:  ? You are over 35 years old.  ? You smoke.  ? You get migraines.  ? You have had a blood clot.  What other changes can be made?  · Manage your cholesterol.  ? It is important to eat a healthy diet.  ? If your cholesterol cannot be managed through your diet, you may also need to take medicines. Take medicines as told by your doctor.  · Manage your diabetes.  ? It is important to eat a healthy diet and to exercise regularly.  ? If your blood sugar cannot be managed through diet and exercise, you may need to take medicines. Take medicines as told by your doctor.  · Control your high blood pressure (hypertension).  ? Try to keep your blood pressure below 130/80. This can help lower your risk of stroke.  ? It is important to eat a healthy diet and to exercise regularly.  ? If your blood pressure cannot be managed through diet and exercise, you may need to take medicines. Take medicines as told by your doctor.  ? Ask your doctor if you should check your blood pressure at home.  ? Have your blood pressure checked every year. Do this even if your blood pressure is normal.  · Talk to your doctor about getting checked for a sleep disorder. Signs of this can include:  ? Snoring a lot.  ? Feeling very tired.  · Take over-the-counter and prescription medicines only as told by your doctor. These may include aspirin or blood thinners (antiplatelets or anticoagulants).  · Make sure that any other medical conditions you have are managed.  Where to find more information  · American Stroke Association: www.strokeassociation.org  · National Stroke Association: www.stroke.org  Get help right away if:  · You have any symptoms of stroke. \"BE FAST\" is an easy way to remember the main warning signs:  ? B - Balance. Signs are dizziness, sudden trouble walking, or loss of balance.  ? E - Eyes. Signs are trouble seeing or a sudden change in how you see.  ? F - Face. Signs are sudden weakness or loss of feeling " of the face, or the face or eyelid drooping on one side.  ? A - Arms. Signs are weakness or loss of feeling in an arm. This happens suddenly and usually on one side of the body.  ? S - Speech. Signs are sudden trouble speaking, slurred speech, or trouble understanding what people say.  ? T - Time. Time to call emergency services. Write down what time symptoms started.  · You have other signs of stroke, such as:  ? A sudden, very bad headache with no known cause.  ? Feeling sick to your stomach (nausea).  ? Throwing up (vomiting).  ? Jerky movements you cannot control (seizure).  These symptoms may represent a serious problem that is an emergency. Do not wait to see if the symptoms will go away. Get medical help right away. Call your local emergency services (911 in the [place] Do not drive yourself to the hospital.  Summary  · You can prevent a stroke by eating healthy, exercising, not smoking, drinking less alcohol, and treating other health problems, such as diabetes, high blood pressure, or high cholesterol.  · Do not use any products that contain nicotine or tobacco, such as cigarettes and e-cigarettes.  · Get help right away if you have any signs or symptoms of a stroke.  This information is not intended to replace advice given to you by your health care provider. Make sure you discuss any questions you have with your health care provider.  Document Released: 06/18/2013 Document Revised: 02/13/2020 Document Reviewed: 03/21/2018  Elsevier Patient Education © 2020 [employer]

## 2021-07-19 NOTE — PROGRESS NOTES
Subjective:    HPI  Manan Aviles is a 55 y.o. right handed male who presents to [facility] for evaluation of transient neurological symptoms.     He presented to [facility] on 7/5/2021 with complaints of LUE weakness that had resolved upon presentation to the ER.  In ER /88, glucose 120mg/dl.  His glucose at home was 79,mg/dl.   He had woken up that morning feeling sweaty and shortly later noted that his LUE/LLE weakness.  He denied numbness. The symptoms lasted for about 30 minutes..  Denied any other symptoms.       He had recently been switched from Plavix to low dose Xarelto (2.5mg PO daily) for multivessel CAD.    PMH includes DMII, cardiomyopathy, PAD, HLD, diabetic polyneuropathy, Left  BKA, depression. Stroke in 2018 w/o residual symptoms.   Social History:  Denies history of tobacco, alcohol or drug use. He works at a [employer]       He was discharged on DAPT per cardiology (in replacement of Eliquis).  A 14 day Zio Patch was placed at discharge.       He is here with his wife.  No further symptoms.  Blood pressure at home <120/50s.  Blood sugar maximum 129mg/dl.        Review of Systems   Constitutional: Negative for fever.   HENT: Negative for hearing loss.    Eyes: Negative for blurred vision.   Respiratory: Negative for cough.    Cardiovascular: Negative for chest pain.   Gastrointestinal: Negative for heartburn, nausea and vomiting.   Musculoskeletal: Negative for back pain, falls and neck pain.   Skin: Negative for rash.   Neurological: Negative for sensory change, speech change, focal weakness, weakness and headaches.   Psychiatric/Behavioral: Negative for depression. The patient is not nervous/anxious.      Current Outpatient Medications on File Prior to Visit   Medication Sig Dispense Refill   • glipiZIDE (GLUCOTROL) 5 MG Tab Take 5 mg by mouth 2 times a day.     • aspirin 81 MG EC tablet Take 1 tablet by mouth every day. 30 tablet 2   • clopidogrel  (PLAVIX) 75 MG Tab Take 1 tablet by mouth every day. 30 tablet 0   • metFORMIN (GLUCOPHAGE) 500 MG Tab Take 500 mg by mouth 2 times a day.     • metoprolol SR (TOPROL XL) 25 MG TABLET SR 24 HR Take 0.5 Tabs by mouth every evening. 90 Tab 0   • atorvastatin (LIPITOR) 80 MG tablet Take 1 Tab by mouth every day. 90 Tab 3   • ezetimibe (ZETIA) 10 MG Tab Take 1 Tab by mouth every day. (Patient not taking: Reported on 7/19/2021) 90 Tab 3     No current facility-administered medications on file prior to visit.     Past Medical History:   Diagnosis Date   • CAD (coronary artery disease)    • Diabetes (HCC)    • Hyperlipidemia    • Hypertension         Objective:   I personally reviewed imaging below and agree with the findings  MRI brain 7/5/2021  No acute intracranial process.     Remote lacunar infarctions in the right daysi, bilateral cerebral deep white matter.     Hyperintensities in the deep white matter, most likely related to chronic microvascular ischemia.    CTA head 7/5/2021 Unremarkable    CTA neck 7/5/2021      Mild atherosclerotic disease, calcified plaque resulting in 50% stenosis of ICAs bilaterally.   TTE:  LVEF 55%, LA size WNL, BRYON 15    Stroke Labs: 0.72, , A1C 7.7     PHYSICAL ASSESSMENT  Constitutional:  Alert, no apparent distress,  Psych:   mood and affect WNL  Muskuloskeletal:  Moves all extremities equally, strength 5/5 bilaterally, no drift   L BKA noted.  NEUROLOGICAL ASSESSMENT  Oriented X 4, speech fluent, naming and memory intact  CN II: Visual fields are full to confrontation. Fundoscopic exam is normal with sharp discs and no vascular changes. Pupils are 4 mm and briskly reactive to light.   CN III, IV, VI  EOMs intact, no ptosis  CN V: Facial sensation is intact to pinprick in all 3 divisions bilaterally. Corneal responses are intact.  CN VII: Face is symmetric with normal eye closure and smile.  CN VII: Hearing is normal to rubbing fingers  CN IX, X: Palate elevates symmetrically.  Phonation is normal.  CN XI: Head turning and shoulder shrug are intact  CN XII: Tongue is midline with normal movements and no atrophy.                           Sensation to PP equal bilaterally                 No limb ataxia with finger to nose and heel to shin                 Ambulates with steady gait.                 Rhomberg negative                Biceps,brachioradialis, tricep, patellar and ankle reflex all 2+     Cardiovascular:    S1S2, no abnormal rhythm auscultated, no peripheral edema  Neck:                     No carotid bruits noted   Pulmonary:            Respirations easy, lungs clear to auscultation all fields.     Skin:                     No obvious rashes.        Iniital NIHSS  0      Current NIHSS     1a. LOC: 0  1b. LOC Questions: 0  1c. LOC Commands: 0  2. Best Gaze:0  3. Visual Fields: 0  4. Facial Paresis:0   5a. Motor arm left: 0  5b. Motor arm right: 0  6a. Motor leg left: 0  6b. Motor leg right: 0  7. Sensory: 0  8. Best Language: 0  9. Limb Ataxia: 0  10. Dysarthria: 0  11. Extinction/Inattention: 0    Total Score Current  0          Current mRS  0        /70 (BP Location: Right arm, Patient Position: Sitting, BP Cuff Size: Adult)   Pulse 93   Temp 36.5 °C (97.7 °F) (Temporal)   Resp 12   Ht 1.829 m (6')   Wt 77.1 kg (169 lb 15.6 oz)   SpO2 98%   BMI 23.05 kg/m²             Assessment/Plan:   1. TIA (transient ischemic attack)  TIA manifested by LUE weakness.  Pure motor symptom likely represent TIA caused by small vessel disease in the setting of HTN, HLD, and poorly controlled diabetes.   Although he has risk factors for atrial fibrillation, these symptoms do not correlate with embolic event.  No need for further monitoring at this time. Will  return Zio  Patch after full 14 days of monitoring.    Instructed patient to return Zio on 7/21/2021.    ABCD2 Score  Age ?60 years   No = 0  Yes = +1 0   BP ? 140/90 mm/hg (initial either SBP ?140 or DBP ?90         No = 0     Yes  = +1 1   Clinical Features of the TIA        Unilateral weakness +2        Speech disturbance w/o weakness +1        Other Symptoms   = 0 2   Durations of symptoms:        < 10 minutes    =    0         10-59 minutes =   +1         ?60 minutes    =    +2 1   History of diabetes              No   =     0               Yes =  +1 1   Total Score 5           Stroke risk factors include DMII, HLD, cardiomyopathy,     Blood pressure goal less than 130/80, currently at goal.  Continue ASA 81mg and Plavix 75mg per cardiology.    2. Type 2 diabetes mellitus with other specified complication, without long-term current use of insulin (HCC)  A1C goal less than 7.0, current 7.7, continue follow up with primary care/endocrinology,     Diabetic retinal screening up to date.    3. Mixed hyperlipidemia  LDL goal < 70, current 115 (on ATorvastatin 40mg)  continue Atorvastatin 80mg and Zetia 10mg, discussed medication side effects, will need follow up with primary care evaluate liver function and intervals and refill  Exercise at least 30 minutes daily, avoid red meat, fried foods, butter, cheese.   Eat 5-6 servings of vegetables and fruits daily, choose lean white meat without skin (chicken, turkey, white fish)--baked, broiled or grilled.    He was also on Zetia 10mg, he is not taking right now because he was worried about side effects, he is waiting to hear back from cardiology.      4. Ischemic cardiomyopathy  Continue f/u with cardiology        If any new signs of stroke:  sudden weakness, numbness, speech difficulty (slurring or difficulty finding words), imbalance, incoordination, worse headache of life or vision loss occur, call 911.      Take all medications as prescribed unless instructed by your provider.      I spent a total of 68 minutes caring for patient,  my time includes counseling, review of systems, HPI and assessment, review of images, labs and testing as above.  I reviewed the hospital records, PMH, social and  family history.   I have counseled patient on stroke prevention strategies, stroke symptoms and mimics.  Diet and exercise modifications.  We discussed medication side effects and instructions.       I have provided patient a written personalized stroke prevention plan, it is filed under the media tab under ‘[facility]”.

## 2021-07-22 ENCOUNTER — TELEPHONE (OUTPATIENT)
Dept: NEUROLOGY | Facility: MEDICAL CENTER | Age: 56
End: 2021-07-22

## 2021-07-22 NOTE — TELEPHONE ENCOUNTER
Reviewed ZIO trigger from 7/20/2021    NSR w/artifact       Lara Sultana, MSN, FNP  Nurse Practitioner  [facility]   [phone] Phone  [phone] Fax

## 2021-07-28 LAB — EKG IMPRESSION: NORMAL

## 2021-08-02 ENCOUNTER — TELEPHONE (OUTPATIENT)
Dept: CARDIOLOGY | Facility: MEDICAL CENTER | Age: 56
End: 2021-08-02

## 2021-08-08 PROCEDURE — 93248 EXT ECG>7D<15D REV&INTERPJ: CPT | Performed by: INTERNAL MEDICINE

## 2021-09-09 ENCOUNTER — OFFICE VISIT (OUTPATIENT)
Dept: CARDIOLOGY | Facility: MEDICAL CENTER | Age: 56
End: 2021-09-09
Payer: COMMERCIAL

## 2021-09-09 VITALS
WEIGHT: 169 LBS | OXYGEN SATURATION: 99 % | SYSTOLIC BLOOD PRESSURE: 122 MMHG | HEIGHT: 72 IN | DIASTOLIC BLOOD PRESSURE: 64 MMHG | HEART RATE: 77 BPM | BODY MASS INDEX: 22.89 KG/M2

## 2021-09-09 DIAGNOSIS — I25.10 CORONARY ARTERY DISEASE DUE TO LIPID RICH PLAQUE: ICD-10-CM

## 2021-09-09 DIAGNOSIS — I25.83 CORONARY ARTERY DISEASE DUE TO LIPID RICH PLAQUE: ICD-10-CM

## 2021-09-09 DIAGNOSIS — I25.5 ISCHEMIC CARDIOMYOPATHY: ICD-10-CM

## 2021-09-09 DIAGNOSIS — E78.2 MIXED HYPERLIPIDEMIA: ICD-10-CM

## 2021-09-09 DIAGNOSIS — E11.65 UNCONTROLLED TYPE 2 DIABETES MELLITUS WITH HYPERGLYCEMIA (HCC): ICD-10-CM

## 2021-09-09 PROCEDURE — 99214 OFFICE O/P EST MOD 30 MIN: CPT | Performed by: INTERNAL MEDICINE

## 2021-09-09 RX ORDER — METOPROLOL SUCCINATE 25 MG/1
12.5 TABLET, EXTENDED RELEASE ORAL 2 TIMES DAILY
Qty: 90 TABLET | Refills: 3 | Status: ON HOLD | OUTPATIENT
Start: 2021-09-09 | End: 2022-03-11 | Stop reason: SDUPTHER

## 2021-09-09 ASSESSMENT — ENCOUNTER SYMPTOMS
NEAR-SYNCOPE: 0
PALPITATIONS: 0
DIZZINESS: 0
FEVER: 0
ORTHOPNEA: 0
WEIGHT LOSS: 0
DECREASED APPETITE: 0
SHORTNESS OF BREATH: 0
DYSPNEA ON EXERTION: 0
DIARRHEA: 0
CLAUDICATION: 0
PND: 0
DEPRESSION: 0
NAUSEA: 0
COUGH: 0
IRREGULAR HEARTBEAT: 0
WEIGHT GAIN: 0
VOMITING: 0
ALTERED MENTAL STATUS: 0
HEARTBURN: 0
FLANK PAIN: 0
BLURRED VISION: 0
SYNCOPE: 0
ABDOMINAL PAIN: 0
CONSTIPATION: 0
BACK PAIN: 0

## 2021-09-09 ASSESSMENT — FIBROSIS 4 INDEX: FIB4 SCORE: 2.25

## 2021-09-09 NOTE — PROGRESS NOTES
Cardiology Note    Heart failure    History of Present Illness: Israel Aviles is a 55 year old man PMH DM2, HTN, PAD s/p angioplasty c/b osteomyelitis s/p LLE BKA 12/20/19; incidentally found new HF 30-35% now recovered and obstructive multivessel CAD presents for follow up.    Doing well this visit. No active cardiac complaints; no heart failure symptoms. Did not tolerate zetia. Felt light headed. This has improved after stopping medication. Compliant with remaining medications and denies adverse effects.     Review of Systems   Constitutional: Negative for decreased appetite, fever, malaise/fatigue, weight gain and weight loss.   HENT: Negative for congestion and nosebleeds.    Eyes: Negative for blurred vision.   Cardiovascular: Negative for chest pain, claudication, dyspnea on exertion, irregular heartbeat, leg swelling, near-syncope, orthopnea, palpitations, paroxysmal nocturnal dyspnea and syncope.   Respiratory: Negative for cough and shortness of breath.    Endocrine: Negative for cold intolerance and heat intolerance.   Skin: Negative for rash.   Musculoskeletal: Negative for back pain.   Gastrointestinal: Negative for abdominal pain, constipation, diarrhea, heartburn, melena, nausea and vomiting.   Genitourinary: Negative for dysuria, flank pain and hematuria.   Neurological: Negative for dizziness.   Psychiatric/Behavioral: Negative for altered mental status and depression.         Past Medical History:   Diagnosis Date   • CAD (coronary artery disease)    • Diabetes (HCC)    • Hyperlipidemia    • Hypertension          Past Surgical History:   Procedure Laterality Date   • KNEE AMPUTATION BELOW Left 12/20/2019    Procedure: AMPUTATION, BELOW KNEE;  Surgeon: Koby Zhao M.D.;  Location: SURGERY [facility];  Service: Orthopedics   • ZZZ CARDIAC CATH  12/16/2019    multi-vessel disease   • IRRIGATION & DEBRIDEMENT ORTHO Left 6/24/2019    Procedure: IRRIGATION AND DEBRIDEMENT, WOUND-FOOT,  BIOLOGIC PLACEMENT, WOUND VAC PLACEMENT;  Surgeon: Charles Chopra M.D.;  Location: SURGERY [facility];  Service: Orthopedics         Current Outpatient Medications   Medication Sig Dispense Refill   • metoprolol SR (TOPROL XL) 25 MG TABLET SR 24 HR Take 0.5 Tablets by mouth 2 times a day. 90 Tablet 3   • glipiZIDE (GLUCOTROL) 5 MG Tab Take 5 mg by mouth 2 times a day.     • aspirin 81 MG EC tablet Take 1 tablet by mouth every day. 30 tablet 2   • clopidogrel (PLAVIX) 75 MG Tab Take 1 tablet by mouth every day. 30 tablet 0   • metFORMIN (GLUCOPHAGE) 500 MG Tab Take 500 mg by mouth 2 times a day.     • atorvastatin (LIPITOR) 80 MG tablet Take 1 Tab by mouth every day. 90 Tab 3     No current facility-administered medications for this visit.         No Known Allergies      Family History   Problem Relation Age of Onset   • Diabetes Mother    • Diabetes Father          Social History     Socioeconomic History   • Marital status:      Spouse name: Not on file   • Number of children: Not on file   • Years of education: Not on file   • Highest education level: Not on file   Occupational History   • Not on file   Tobacco Use   • Smoking status: Never Smoker   • Smokeless tobacco: Never Used   Vaping Use   • Vaping Use: Never used   Substance and Sexual Activity   • Alcohol use: No   • Drug use: No   • Sexual activity: Not on file   Other Topics Concern   • Not on file   Social History Narrative   • Not on file     Social Determinants of Health     Financial Resource Strain:    • Difficulty of Paying Living Expenses:    Food Insecurity:    • Worried About Running Out of Food in the Last Year:    • Ran Out of Food in the Last Year:    Transportation Needs:    • Lack of Transportation (Medical):    • Lack of Transportation (Non-Medical):    Physical Activity:    • Days of Exercise per Week:    • Minutes of Exercise per Session:    Stress:    • Feeling of Stress :    Social Connections:    • Frequency of  Communication with Friends and Family:    • Frequency of Social Gatherings with Friends and Family:    • Attends [employer] Services:    • Active Member of Clubs or Organizations:    • Attends Club or Organization Meetings:    • Marital Status:    Intimate Partner Violence:    • Fear of Current or Ex-Partner:    • Emotionally Abused:    • Physically Abused:    • Sexually Abused:          Physical Exam:  Ambulatory Vitals  /64 (BP Location: Left arm, Patient Position: Sitting, BP Cuff Size: Adult)   Pulse 77   Ht 1.829 m (6')   Wt 76.7 kg (169 lb)   SpO2 99%    BP Readings from Last 4 Encounters:   09/09/21 122/64   07/19/21 112/70   07/06/21 130/89   01/20/21 118/80     Weight/BMI:   Vitals:    09/09/21 0913   BP: 122/64   Weight: 76.7 kg (169 lb)   Height: 1.829 m (6')    Body mass index is 22.92 kg/m².  Wt Readings from Last 4 Encounters:   09/09/21 76.7 kg (169 lb)   07/19/21 77.1 kg (169 lb 15.6 oz)   07/05/21 77.6 kg (171 lb)   01/20/21 77.6 kg (171 lb)       Physical Exam  Constitutional:       General: He is not in acute distress.  HENT:      Head: Normocephalic and atraumatic.   Eyes:      Conjunctiva/sclera: Conjunctivae normal.      Pupils: Pupils are equal, round, and reactive to light.   Neck:      Vascular: No JVD.   Cardiovascular:      Rate and Rhythm: Normal rate and regular rhythm.      Heart sounds: Normal heart sounds. No murmur heard.   No friction rub. No gallop.    Pulmonary:      Effort: Pulmonary effort is normal. No respiratory distress.      Breath sounds: Normal breath sounds. No wheezing or rales.   Chest:      Chest wall: No tenderness.   Abdominal:      General: Bowel sounds are normal. There is no distension.      Palpations: Abdomen is soft.   Musculoskeletal:      Cervical back: Normal range of motion and neck supple.   Skin:     General: Skin is warm and dry.   Neurological:      Mental Status: He is alert and oriented to person, place, and time.   Psychiatric:         Mood  and Affect: Affect normal.         Judgment: Judgment normal.         Lab Data Review:  Lab Results   Component Value Date/Time    CHOLSTRLTOT 189 07/06/2021 03:40 AM     (H) 07/06/2021 03:40 AM    HDL 41 07/06/2021 03:40 AM    TRIGLYCERIDE 164 (H) 07/06/2021 03:40 AM       Lab Results   Component Value Date/Time    SODIUM 138 07/06/2021 03:40 AM    POTASSIUM 4.1 07/06/2021 03:40 AM    CHLORIDE 103 07/06/2021 03:40 AM    CO2 26 07/06/2021 03:40 AM    GLUCOSE 154 (H) 07/06/2021 03:40 AM    BUN 10 07/06/2021 03:40 AM    CREATININE 0.72 07/06/2021 03:40 AM    CREATININE 0.9 05/09/2007 01:20 AM    BUNCREATRAT 14 07/01/2021 05:33 AM     CrCl cannot be calculated (Patient's most recent lab result is older than the maximum 7 days allowed.).  Lab Results   Component Value Date/Time    ALKPHOSPHAT 70 07/06/2021 03:40 AM    ASTSGOT 21 07/06/2021 03:40 AM    ALTSGPT 12 07/06/2021 03:40 AM    TBILIRUBIN 0.3 07/06/2021 03:40 AM      Lab Results   Component Value Date/Time    WBC 4.3 (L) 07/06/2021 03:40 AM     Lab Results   Component Value Date/Time    HBA1C 7.7 (H) 07/01/2021 05:33 AM    HBA1C 7.3 (H) 01/05/2021 07:20 AM     No components found for: TROP    Outside a1c 6.3 noted by Dr Mcnally 10/16/20    Cardiac Imaging and Procedures Review:      EKG 7/2021 sinus rhythm 83 bpm    zio monitor 8/2021  Procedure: zio monitor 13 days 21 hours   Indication: TIA   Quality: good   Findings:   Underlying rhythm: Predominantly sinus rhythm; average rate 82 bpm. No atrial fibrillation nor flutter detected.   Atrial events: Rare ectopy. 3 episodes of supraventricular tachycardia (SVT); fastest 174 bpm which was also longest at 9 beats.   Ventricular events: Rare ectopy.   Patient events: Patient triggered symptoms associated with sinus rhythm. Asymptomatic, short SVT episodes.   Impressions:   Predominantly sinus rhythm.   Patient triggered symptoms associated with sinus rhythm.   Asymptomatic, short SVT episodes.   Rare  ectopy.     TTE 1/11/2021  CONCLUSIONS  Normal left ventricular size, wall thickness, and systolic function.  Normal right ventricular size and systolic function.  No significant valvular pathology.  Normal pericardium without effusion.     Compared to the images of the prior study done 12/18/19, left   ventricular systolic function has recovered.    Cardiac MRI 7/6/20  1. Diffuse subendocardial late gadolinium enhancement involving the apex, apical septal, apical inferior, apical lateral, apical anterior, in keeping with prior infarct.     The apex is thinning with more than 75% enhancement, likely transmural infarct with no viability.     The other apical septal, apical inferior, apical lateral, apical anterior segments have 40-60% late gadolinium enhancement of the myocardial thickness.     2. Akinesis of the apical septum and apical segments. Hypokinesis of the apical anterior segment. Grossly preserved contraction in the apical lateral and apical inferior segments.     Ejection fraction of the left ventricle is 46 %, similar to prior.     3. No thrombus in the left ventricular apex.    TTE 12/18/2019  CONCLUSIONS  Limited Exam for LV Apical Thrombus.   No thrombus. Mildly reduced left ventricular systolic function.  Left ventricular ejection fraction is visually estimated to be 45%.  There is apical septum and apical akinesis.    TTE 12/16/19  CONCLUSIONS  Severely reduced left ventricular systolic function. Left ventricular   ejection fraction is visually estimated to be 30-35%.   There appears to be a linear echodensity which is adjacent to the LV   apex concerning for possible small LV thrombus. May consider repeat   limted echo in 1-2 days for re-evaluation if clinically indicated.    Normal inferior vena cava size and inspiratory collapse.  Indeterminate diastolic function.  Aortic sclerosis without stenosis.     [facility] 12/2019  Coronary Diagnostic Findings    * Left main: Distal tapering with 50% stenosis.     * Left anterior descending: Diffuse atherosclerosis and negative remodeling  beginning at the origin.  There are sequential 70% stenosis in the proximal  and mid segment of the vessel.  There is subtotal occlusion in the mid to  distal segment with AGATHA II flow into the apical LAD. The first diagonal has  proximal 70% stenosis and is diffusely diseased and negatively remodeled with  95% stenosis in the lower branch.    * Left circumflex: Mid vessel 70-80% stenosis.  The OM1 has diffuse  atherosclerosis there is less than 1.5 mm in size and without significant  narrowing.  The OM 2 bifurcates proximally the upper branch has proximal 50%  stenosis and is a less than 1.5 mm vessel.  The lower branch is a 2.25 mm  vessel and has proximal 85% stenosis.    * Right coronary artery: Dominant, diffuse 60% stenosis in the proximal mid  and distal AV groove.  There is a dual PDA.  The first PDA has a mid 80%  stenosis and diffuse disease downstream.  The second PDA has proximal 80%  stenosis.  Conclusions    1. Severe LV systolic dysfunction by noninvasive evaluation.    2. Severe and diffuse obstructive three-vessel coronary artery disease.    3. Normal LVEDP.    Medical Decision Making:  Problem List Items Addressed This Visit     Uncontrolled type 2 diabetes mellitus with hyperglycemia (HCC)    Mixed hyperlipidemia    Relevant Medications    metoprolol SR (TOPROL XL) 25 MG TABLET SR 24 HR    CAD (coronary artery disease)    Relevant Medications    metoprolol SR (TOPROL XL) 25 MG TABLET SR 24 HR    Ischemic cardiomyopathy    Relevant Medications    metoprolol SR (TOPROL XL) 25 MG TABLET SR 24 HR    Other Relevant Orders    Lipid Profile        ICM, HFrecEF, NYHA I, stage C - recovered systolic function; may have had a component of stress cardiomyopathy which improved with osteomyelitis. Attempt titrate metoprolol to 12.5mg bid; previously did not tolerate 25mg he recounts due to hypotension. Cannot tolerate Ace/arb due to  hypotension.    Severe CAD/PAD/HLD - previously on aspirin 81mg and xarelto 2.5mg bid; due to cost issues now on DAPT. Continue statin. Annual lipids. Goal LDL <70 and trig <150. Repeat lipids. Consider pcsk9 if remains above goal. Rediscuss revascularization if becomes symptomatic or LVEF worsens.    Dm2 - please consider SGLT2i in place of current regimen.     It was my pleasure to meet with Mr. Aviles.

## 2021-10-22 NOTE — TELEPHONE ENCOUNTER
Received request via: Patient    Was the patient seen in the last year in this department? Yes    Does the patient have an active prescription (recently filled or refills available) for medication(s) requested? No       Patient has only 1 pill left.

## 2021-10-27 RX ORDER — GLIPIZIDE 5 MG/1
5 TABLET ORAL 2 TIMES DAILY
Qty: 60 TABLET | Refills: 3 | Status: SHIPPED | OUTPATIENT
Start: 2021-10-27 | End: 2021-11-02 | Stop reason: SDUPTHER

## 2021-11-03 RX ORDER — GLIPIZIDE 5 MG/1
5 TABLET ORAL 2 TIMES DAILY
Qty: 60 TABLET | Refills: 3 | Status: SHIPPED | OUTPATIENT
Start: 2021-11-03 | End: 2022-02-17

## 2021-11-23 ENCOUNTER — OFFICE VISIT (OUTPATIENT)
Dept: MEDICAL GROUP | Facility: CLINIC | Age: 56
End: 2021-11-23
Payer: COMMERCIAL

## 2021-11-23 VITALS
HEIGHT: 72 IN | TEMPERATURE: 97.7 F | OXYGEN SATURATION: 96 % | WEIGHT: 172 LBS | SYSTOLIC BLOOD PRESSURE: 108 MMHG | DIASTOLIC BLOOD PRESSURE: 54 MMHG | RESPIRATION RATE: 16 BRPM | BODY MASS INDEX: 23.3 KG/M2 | HEART RATE: 78 BPM

## 2021-11-23 DIAGNOSIS — E78.5 DYSLIPIDEMIA DUE TO TYPE 2 DIABETES MELLITUS (HCC): ICD-10-CM

## 2021-11-23 DIAGNOSIS — I25.10 CORONARY ARTERY DISEASE INVOLVING NATIVE HEART WITHOUT ANGINA PECTORIS, UNSPECIFIED VESSEL OR LESION TYPE: ICD-10-CM

## 2021-11-23 DIAGNOSIS — E11.65 UNCONTROLLED TYPE 2 DIABETES MELLITUS WITH HYPERGLYCEMIA (HCC): ICD-10-CM

## 2021-11-23 DIAGNOSIS — E11.69 DYSLIPIDEMIA DUE TO TYPE 2 DIABETES MELLITUS (HCC): ICD-10-CM

## 2021-11-23 DIAGNOSIS — Z53.20 COLON CANCER SCREENING DECLINED: ICD-10-CM

## 2021-11-23 PROBLEM — G98.8 DISORDER OF NERVOUS SYSTEM DUE TO TYPE 2 DIABETES MELLITUS (HCC): Status: ACTIVE | Noted: 2021-11-23

## 2021-11-23 PROCEDURE — 99214 OFFICE O/P EST MOD 30 MIN: CPT | Performed by: FAMILY MEDICINE

## 2021-11-23 ASSESSMENT — FIBROSIS 4 INDEX: FIB4 SCORE: 2.29

## 2021-11-23 NOTE — ASSESSMENT & PLAN NOTE
Will refer to  Endocrinology for some help with his Diabetes and in light of his significant heart disease.

## 2021-11-23 NOTE — PROGRESS NOTES
[facility] MEDICINE     PATIENT ID:  NAME:  Israel Aviles  MRN:               [id number]  YOB: 1965    Date: 9:25 AM      CC:  follow up diabetes, hypertension, recent labs      HPI: Israel Aviles is a 56 y.o. male who presented with     Problem   Disorder of Nervous System Due to Type 2 Diabetes Mellitus (Hcc)   Colon Cancer Screening Declined    There is no family history of colon cancer.  He has no rectal bleeding.  He is not interested in screening.       Dyslipidemia Due to Type 2 Diabetes Mellitus (Hcc)    He is on maximal Atorvastatin therapy daily.  Labs of  11/10/21 show LDL 84, HDL 43, Triglycerides 91.   Has known CAD.       Cad (Coronary Artery Disease)    Has known cardiac disease, and a past history of MI and was a candidate for CABG.  Has risk factors of Diabetes and Dyslipidemia.      Uncontrolled Type 2 Diabetes Mellitus With Hyperglycemia (Hcc)    Labs of 11/10/21 show A1c 8.6% and fasting glucose 183.  He is only taking Metformin and Glipizide.  Not seeing Endocrinology.         Hyperlipidemia       REVIEW OF SYSTEMS:   Ten systems reviewed and were negative except as noted in the HPI.                PROBLEM LIST  Patient Active Problem List   Diagnosis   • Acute ischemic stroke (HCC)   • Uncontrolled type 2 diabetes mellitus with hyperglycemia (HCC)   • Hyperlipidemia   • Wound infection   • PVD (peripheral vascular disease) (HCC)   • Diabetic foot ulcer (HCC)   • Diabetic polyneuropathy associated with type 2 diabetes mellitus (HCC)   • Pressure injury of deep tissue of left foot   • Diabetic ulcer of toe of left foot associated with type 2 diabetes mellitus, with necrosis of muscle (HCC)   • Osteomyelitis of left foot (HCC)   • Diabetic foot (HCC)   • CAD (coronary artery disease)   • Orthostatic hypotension   • Non-healing wound of right heel   • Below-knee amputation ([facility])   • Adjustment disorder with depressed mood   • HFrEF (heart failure with reduced ejection fraction)  (HCC)   • Ischemic cardiomyopathy   • Body mass index (BMI) 23.0-23.9, adult   • Type 2 diabetes mellitus with other specified complication (HCC)   • TIA (transient ischemic attack)   • 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)   • Dyslipidemia due to type 2 diabetes mellitus (HCC)   • Colon cancer screening declined        PAST SURGICAL HISTORY:  Past Surgical History:   Procedure Laterality Date   • KNEE AMPUTATION BELOW Left 12/20/2019    Procedure: AMPUTATION, BELOW KNEE;  Surgeon: Koby Zhao M.D.;  Location: SURGERY [facility];  Service: Orthopedics   • ZZZ CARDIAC CATH  12/16/2019    multi-vessel disease   • IRRIGATION & DEBRIDEMENT ORTHO Left 6/24/2019    Procedure: IRRIGATION AND DEBRIDEMENT, WOUND-FOOT, BIOLOGIC PLACEMENT, WOUND VAC PLACEMENT;  Surgeon: Charles Chopra M.D.;  Location: SURGERY [facility];  Service: Orthopedics       FAMILY HISTORY:  Family History   Problem Relation Age of Onset   • Diabetes Mother    • Diabetes Father        SOCIAL HISTORY:   Social History     Tobacco Use   • Smoking status: Never Smoker   • Smokeless tobacco: Never Used   Substance Use Topics   • Alcohol use: No       ALLERGIES:  No Known Allergies    OUTPATIENT MEDICATIONS:    Current Outpatient Medications:   •  glipiZIDE (GLUCOTROL) 5 MG Tab, Take 1 Tablet by mouth 2 times a day., Disp: 60 Tablet, Rfl: 3  •  metoprolol SR (TOPROL XL) 25 MG TABLET SR 24 HR, Take 0.5 Tablets by mouth 2 times a day., Disp: 90 Tablet, Rfl: 3  •  aspirin 81 MG EC tablet, Take 1 tablet by mouth every day., Disp: 30 tablet, Rfl: 2  •  clopidogrel (PLAVIX) 75 MG Tab, Take 1 tablet by mouth every day., Disp: 30 tablet, Rfl: 0  •  metFORMIN (GLUCOPHAGE) 500 MG Tab, Take 500 mg by mouth 2 times a day., Disp: , Rfl:   •  atorvastatin (LIPITOR) 80 MG tablet, Take 1 Tab by mouth every day., Disp: 90 Tab, Rfl: 3    PHYSICAL EXAM:  Vitals:    11/23/21 0835   BP: 108/54   BP Location: Left arm   Patient  Position: Sitting   BP Cuff Size: Adult   Pulse: 78   Resp: 16   Temp: 36.5 °C (97.7 °F)   TempSrc: Tympanic   SpO2: 96%   Weight: 78 kg (172 lb)   Height: 1.829 m (6')       General: Pt resting in NAD, cooperative   Skin:  Pink, warm and dry.  HEENT: NC/AT. EOMI.  Lungs:  Symmetrical.  CTAB, good air movement   Cardiovascular:  S1/S2 RRR   Abdomen:  Abdomen is soft, nontender  Extremities:  Full range of motion. Left leg is amputee leg with prosthesis.   CNS:  Muscle tone is normal. No gross focal neurologic deficits      ASSESSMENT/PLAN:   56 y.o. male     Problem List Items Addressed This Visit     CAD (coronary artery disease)     Follow up with [facility] regularly.   Continue daily statin.   Continue daily aspirin therapy.          Colon cancer screening declined     Declines colon cancer screening measures.          Dyslipidemia due to type 2 diabetes mellitus (HCC)     Continue daily statin therapy.   Continue daily baby Aspirin therapy.           Uncontrolled type 2 diabetes mellitus with hyperglycemia (HCC)     Will refer to  Endocrinology for some help with his Diabetes and in light of his significant heart disease.                Donny Mcnally MD  [facility]

## 2021-11-23 NOTE — ASSESSMENT & PLAN NOTE
Follow up with [facility] regularly.   Continue daily statin.   Continue daily aspirin therapy.

## 2021-11-23 NOTE — PATIENT INSTRUCTIONS
Will refer to Endocrinology for your Diabetes. A1c 8.6% is too high.      Uncontrolled diabetes is a bad risk factor for more heart attacks.     Keep physically active.

## 2021-11-28 ENCOUNTER — OFFICE VISIT (OUTPATIENT)
Dept: URGENT CARE | Facility: CLINIC | Age: 56
End: 2021-11-28

## 2021-11-28 VITALS
OXYGEN SATURATION: 98 % | HEART RATE: 98 BPM | BODY MASS INDEX: 22.75 KG/M2 | WEIGHT: 168 LBS | HEIGHT: 72 IN | TEMPERATURE: 97.2 F | RESPIRATION RATE: 18 BRPM | DIASTOLIC BLOOD PRESSURE: 60 MMHG | SYSTOLIC BLOOD PRESSURE: 100 MMHG

## 2021-11-28 DIAGNOSIS — J04.0 LARYNGITIS: ICD-10-CM

## 2021-11-28 DIAGNOSIS — J06.9 UPPER RESPIRATORY TRACT INFECTION, UNSPECIFIED TYPE: ICD-10-CM

## 2021-11-28 PROCEDURE — 99213 OFFICE O/P EST LOW 20 MIN: CPT | Performed by: PHYSICIAN ASSISTANT

## 2021-11-28 ASSESSMENT — FIBROSIS 4 INDEX: FIB4 SCORE: 2.29

## 2021-11-29 ASSESSMENT — ENCOUNTER SYMPTOMS
CHILLS: 0
DIARRHEA: 0
COUGH: 1
NAUSEA: 0
EYE PAIN: 0
SORE THROAT: 1
FEVER: 0
VOMITING: 0
CONSTIPATION: 0
MYALGIAS: 0
SHORTNESS OF BREATH: 0
ABDOMINAL PAIN: 0
HEADACHES: 0

## 2021-11-29 NOTE — PROGRESS NOTES
Subjective:   Israel Aviles is a 56 y.o. male who presents for Sore Throat (Hoarse throat, hard to talk. Hurts to swallow. Cough.  x 2 days. )      56-year-old male who presents with a 2-day history of sore throat, the sore throat is mostly resolved and he denies any current difficulty swallowing but what is concerning to him as he is noticed hoarse voice with occasional dry cough. He feels mildly tired but denies any fevers or chills, body aches. He denies any difficulty breathing. He has been using Advil which seems to help out with the pain      Review of Systems   Constitutional: Positive for malaise/fatigue. Negative for chills and fever.   HENT: Positive for sore throat. Negative for congestion and ear pain.    Eyes: Negative for pain.   Respiratory: Positive for cough. Negative for shortness of breath.    Cardiovascular: Negative for chest pain.   Gastrointestinal: Negative for abdominal pain, constipation, diarrhea, nausea and vomiting.   Genitourinary: Negative for dysuria.   Musculoskeletal: Negative for myalgias.   Skin: Negative for rash.   Neurological: Negative for headaches.       Medications, Allergies, and current problem list reviewed today in Epic.     Objective:     /60   Pulse 98   Temp 36.2 °C (97.2 °F)   Resp 18   Ht 1.829 m (6')   Wt 76.2 kg (168 lb)   SpO2 98%     Physical Exam  Vitals reviewed.   Constitutional:       Appearance: Normal appearance. He is not ill-appearing or toxic-appearing.      Comments: Hoarse phonation   HENT:      Head: Normocephalic and atraumatic.      Right Ear: External ear normal.      Left Ear: External ear normal.      Nose: Nose normal. No congestion or rhinorrhea.      Mouth/Throat:      Mouth: Mucous membranes are moist.      Pharynx: Oropharynx is clear. No oropharyngeal exudate or posterior oropharyngeal erythema.      Comments: Midline uvula, mild pharyngeal erythema  Eyes:      Conjunctiva/sclera: Conjunctivae normal.      Pupils:  Pupils are equal, round, and reactive to light.   Cardiovascular:      Rate and Rhythm: Normal rate and regular rhythm.   Pulmonary:      Effort: Pulmonary effort is normal.      Breath sounds: Normal breath sounds.   Musculoskeletal:      Cervical back: Normal range of motion.   Lymphadenopathy:      Cervical: No cervical adenopathy.   Skin:     General: Skin is warm and dry.      Capillary Refill: Capillary refill takes less than 2 seconds.   Neurological:      Mental Status: He is alert and oriented to person, place, and time.         Assessment/Plan:     Diagnosis and associated orders:     1. Laryngitis     2. Upper respiratory tract infection, unspecified type        Comments/MDM:     No signs of airway compromise or severe infection. No hypoxia or signs of secondary bacterial infection. Patient with clear airway tolerating liquids and solids without difficulty. Discussed supportive care. Rapid strep in clinic was negative. Patient and his son declines Covid testing at this time. Encouraged vocal rest, fluids, judicious use of anti-inflammatories, maintain hydration. Encouraged to return if worsening or difficulty swallowing presents.       Differential diagnosis, natural history, supportive care, and indications for immediate follow-up discussed.    Advised the patient to follow-up with the primary care physician for recheck, reevaluation, and consideration of further management.    Please note that this dictation was created using voice recognition software. I have made a reasonable attempt to correct obvious errors, but I expect that there are errors of grammar and possibly content that I did not discover before finalizing the note.    This note was electronically signed by Charles Kim PA-C

## 2022-01-24 ENCOUNTER — OFFICE VISIT (OUTPATIENT)
Dept: CARDIOLOGY | Facility: MEDICAL CENTER | Age: 57
End: 2022-01-24
Payer: COMMERCIAL

## 2022-01-24 VITALS
WEIGHT: 171 LBS | DIASTOLIC BLOOD PRESSURE: 64 MMHG | SYSTOLIC BLOOD PRESSURE: 112 MMHG | HEART RATE: 83 BPM | RESPIRATION RATE: 14 BRPM | BODY MASS INDEX: 23.16 KG/M2 | HEIGHT: 72 IN | OXYGEN SATURATION: 100 %

## 2022-01-24 DIAGNOSIS — I73.9 PVD (PERIPHERAL VASCULAR DISEASE) (HCC): ICD-10-CM

## 2022-01-24 DIAGNOSIS — E78.2 MIXED HYPERLIPIDEMIA: ICD-10-CM

## 2022-01-24 DIAGNOSIS — I25.10 CORONARY ARTERY DISEASE DUE TO LIPID RICH PLAQUE: ICD-10-CM

## 2022-01-24 DIAGNOSIS — I25.83 CORONARY ARTERY DISEASE DUE TO LIPID RICH PLAQUE: ICD-10-CM

## 2022-01-24 DIAGNOSIS — I50.20 HFREF (HEART FAILURE WITH REDUCED EJECTION FRACTION) (HCC): ICD-10-CM

## 2022-01-24 PROCEDURE — 99214 OFFICE O/P EST MOD 30 MIN: CPT | Performed by: INTERNAL MEDICINE

## 2022-01-24 RX ORDER — EZETIMIBE 10 MG/1
10 TABLET ORAL DAILY
Qty: 90 TABLET | Refills: 3 | Status: SHIPPED | OUTPATIENT
Start: 2022-01-24 | End: 2022-02-17

## 2022-01-24 ASSESSMENT — ENCOUNTER SYMPTOMS
IRREGULAR HEARTBEAT: 0
HEARTBURN: 0
DECREASED APPETITE: 0
BACK PAIN: 0
ABDOMINAL PAIN: 0
FEVER: 0
CONSTIPATION: 0
PND: 0
DYSPNEA ON EXERTION: 0
DIARRHEA: 0
CLAUDICATION: 0
SYNCOPE: 0
BLURRED VISION: 0
DIZZINESS: 0
NEAR-SYNCOPE: 0
DEPRESSION: 0
VOMITING: 0
FLANK PAIN: 0
WEIGHT LOSS: 0
NAUSEA: 0
SHORTNESS OF BREATH: 0
ORTHOPNEA: 0
PALPITATIONS: 0
ALTERED MENTAL STATUS: 0
COUGH: 0
WEIGHT GAIN: 0

## 2022-01-24 ASSESSMENT — FIBROSIS 4 INDEX: FIB4 SCORE: 2.29

## 2022-01-24 NOTE — PROGRESS NOTES
Cardiology Note    Heart failure    History of Present Illness: Israel Aviles is a 56 year old man PMH DM2, HTN, PAD s/p angioplasty c/b osteomyelitis s/p LLE BKA 12/20/19; incidentally found new HF 30-35% now recovered and obstructive multivessel CAD presents for follow up.    This visit doing well. No active cardiac complaints. Able to ambulate 2-3 hours when doing shopping with family. Not limited when doing so. Compliant with medications and denies adverse effects.     Review of Systems   Constitutional: Negative for decreased appetite, fever, malaise/fatigue, weight gain and weight loss.   HENT: Negative for congestion and nosebleeds.    Eyes: Negative for blurred vision.   Cardiovascular: Negative for chest pain, claudication, dyspnea on exertion, irregular heartbeat, leg swelling, near-syncope, orthopnea, palpitations, paroxysmal nocturnal dyspnea and syncope.   Respiratory: Negative for cough and shortness of breath.    Endocrine: Negative for cold intolerance and heat intolerance.   Skin: Negative for rash.   Musculoskeletal: Negative for back pain.   Gastrointestinal: Negative for abdominal pain, constipation, diarrhea, heartburn, melena, nausea and vomiting.   Genitourinary: Negative for dysuria, flank pain and hematuria.   Neurological: Negative for dizziness.   Psychiatric/Behavioral: Negative for altered mental status and depression.         Past Medical History:   Diagnosis Date   • CAD (coronary artery disease)    • Diabetes (HCC)    • Hyperlipidemia    • Hypertension          Past Surgical History:   Procedure Laterality Date   • KNEE AMPUTATION BELOW Left 12/20/2019    Procedure: AMPUTATION, BELOW KNEE;  Surgeon: Koby Zhao M.D.;  Location: SURGERY [facility];  Service: Orthopedics   • ZZZ CARDIAC CATH  12/16/2019    multi-vessel disease   • IRRIGATION & DEBRIDEMENT ORTHO Left 6/24/2019    Procedure: IRRIGATION AND DEBRIDEMENT, WOUND-FOOT, BIOLOGIC PLACEMENT, WOUND VAC PLACEMENT;   Surgeon: Charles Chopra M.D.;  Location: SURGERY [facility];  Service: Orthopedics         Current Outpatient Medications   Medication Sig Dispense Refill   • ezetimibe (ZETIA) 10 MG Tab Take 1 Tablet by mouth every day. 90 Tablet 3   • glipiZIDE (GLUCOTROL) 5 MG Tab Take 1 Tablet by mouth 2 times a day. 60 Tablet 3   • metoprolol SR (TOPROL XL) 25 MG TABLET SR 24 HR Take 0.5 Tablets by mouth 2 times a day. 90 Tablet 3   • aspirin 81 MG EC tablet Take 1 tablet by mouth every day. 30 tablet 2   • clopidogrel (PLAVIX) 75 MG Tab Take 1 tablet by mouth every day. 30 tablet 0   • metFORMIN (GLUCOPHAGE) 500 MG Tab Take 500 mg by mouth 2 times a day.     • atorvastatin (LIPITOR) 80 MG tablet Take 1 Tab by mouth every day. 90 Tab 3     No current facility-administered medications for this visit.         No Known Allergies      Family History   Problem Relation Age of Onset   • Diabetes Mother    • Diabetes Father          Social History     Socioeconomic History   • Marital status:      Spouse name: Not on file   • Number of children: Not on file   • Years of education: Not on file   • Highest education level: Not on file   Occupational History   • Not on file   Tobacco Use   • Smoking status: Never Smoker   • Smokeless tobacco: Never Used   Vaping Use   • Vaping Use: Never used   Substance and Sexual Activity   • Alcohol use: No   • Drug use: No   • Sexual activity: Not on file   Other Topics Concern   • Not on file   Social History Narrative   • Not on file     Social Determinants of Health     Financial Resource Strain:    • Difficulty of Paying Living Expenses: Not on file   Food Insecurity:    • Worried About Running Out of Food in the Last Year: Not on file   • Ran Out of Food in the Last Year: Not on file   Transportation Needs:    • Lack of Transportation (Medical): Not on file   • Lack of Transportation (Non-Medical): Not on file   Physical Activity:    • Days of Exercise per Week: Not on file   •  Minutes of Exercise per Session: Not on file   Stress:    • Feeling of Stress : Not on file   Social Connections:    • Frequency of Communication with Friends and Family: Not on file   • Frequency of Social Gatherings with Friends and Family: Not on file   • Attends Rastafarian Services: Not on file   • Active Member of Clubs or Organizations: Not on file   • Attends Club or Organization Meetings: Not on file   • Marital Status: Not on file   Intimate Partner Violence:    • Fear of Current or Ex-Partner: Not on file   • Emotionally Abused: Not on file   • Physically Abused: Not on file   • Sexually Abused: Not on file   Housing Stability:    • Unable to Pay for Housing in the Last Year: Not on file   • Number of Places Lived in the Last Year: Not on file   • Unstable Housing in the Last Year: Not on file         Physical Exam:  Ambulatory Vitals  /64 (BP Location: Left arm, Patient Position: Sitting, BP Cuff Size: Adult)   Pulse 83   Resp 14   Ht 1.829 m (6')   Wt 77.6 kg (171 lb)   SpO2 100%    BP Readings from Last 4 Encounters:   01/24/22 112/64   11/28/21 100/60   11/23/21 108/54   09/09/21 122/64     Weight/BMI:   Vitals:    01/24/22 0921   BP: 112/64   Weight: 77.6 kg (171 lb)   Height: 1.829 m (6')    Body mass index is 23.19 kg/m².  Wt Readings from Last 4 Encounters:   01/24/22 77.6 kg (171 lb)   11/28/21 76.2 kg (168 lb)   11/23/21 78 kg (172 lb)   09/09/21 76.7 kg (169 lb)     Cardiology Note    Heart failure    History of Present Illness: Israel Aviles is a 55 year old man PMH DM2, HTN, PAD s/p angioplasty c/b osteomyelitis s/p LLE BKA 12/20/19; incidentally found new HF 30-35% now recovered and obstructive multivessel CAD presents for follow up.    Doing well this visit. No active cardiac complaints; no heart failure symptoms. Did not tolerate zetia. Felt light headed. This has improved after stopping medication. Compliant with remaining medications and denies adverse effects.      Review of Systems   Constitutional: Negative for decreased appetite, fever, malaise/fatigue, weight gain and weight loss.   HENT: Negative for congestion and nosebleeds.    Eyes: Negative for blurred vision.   Cardiovascular: Negative for chest pain, claudication, dyspnea on exertion, irregular heartbeat, leg swelling, near-syncope, orthopnea, palpitations, paroxysmal nocturnal dyspnea and syncope.   Respiratory: Negative for cough and shortness of breath.    Endocrine: Negative for cold intolerance and heat intolerance.   Skin: Negative for rash.   Musculoskeletal: Negative for back pain.   Gastrointestinal: Negative for abdominal pain, constipation, diarrhea, heartburn, melena, nausea and vomiting.   Genitourinary: Negative for dysuria, flank pain and hematuria.   Neurological: Negative for dizziness.   Psychiatric/Behavioral: Negative for altered mental status and depression.         Past Medical History:   Diagnosis Date   • CAD (coronary artery disease)    • Diabetes (HCC)    • Hyperlipidemia    • Hypertension          Past Surgical History:   Procedure Laterality Date   • KNEE AMPUTATION BELOW Left 12/20/2019    Procedure: AMPUTATION, BELOW KNEE;  Surgeon: Koby Zhao M.D.;  Location: SURGERY [facility];  Service: Orthopedics   • ZZZ CARDIAC CATH  12/16/2019    multi-vessel disease   • IRRIGATION & DEBRIDEMENT ORTHO Left 6/24/2019    Procedure: IRRIGATION AND DEBRIDEMENT, WOUND-FOOT, BIOLOGIC PLACEMENT, WOUND VAC PLACEMENT;  Surgeon: Charles Chopra M.D.;  Location: SURGERY [facility];  Service: Orthopedics         Current Outpatient Medications   Medication Sig Dispense Refill   • ezetimibe (ZETIA) 10 MG Tab Take 1 Tablet by mouth every day. 90 Tablet 3   • glipiZIDE (GLUCOTROL) 5 MG Tab Take 1 Tablet by mouth 2 times a day. 60 Tablet 3   • metoprolol SR (TOPROL XL) 25 MG TABLET SR 24 HR Take 0.5 Tablets by mouth 2 times a day. 90 Tablet 3   • aspirin 81 MG EC tablet Take 1 tablet by mouth  every day. 30 tablet 2   • clopidogrel (PLAVIX) 75 MG Tab Take 1 tablet by mouth every day. 30 tablet 0   • metFORMIN (GLUCOPHAGE) 500 MG Tab Take 500 mg by mouth 2 times a day.     • atorvastatin (LIPITOR) 80 MG tablet Take 1 Tab by mouth every day. 90 Tab 3     No current facility-administered medications for this visit.         No Known Allergies      Family History   Problem Relation Age of Onset   • Diabetes Mother    • Diabetes Father          Social History     Socioeconomic History   • Marital status:      Spouse name: Not on file   • Number of children: Not on file   • Years of education: Not on file   • Highest education level: Not on file   Occupational History   • Not on file   Tobacco Use   • Smoking status: Never Smoker   • Smokeless tobacco: Never Used   Vaping Use   • Vaping Use: Never used   Substance and Sexual Activity   • Alcohol use: No   • Drug use: No   • Sexual activity: Not on file   Other Topics Concern   • Not on file   Social History Narrative   • Not on file     Social Determinants of Health     Financial Resource Strain:    • Difficulty of Paying Living Expenses: Not on file   Food Insecurity:    • Worried About Running Out of Food in the Last Year: Not on file   • Ran Out of Food in the Last Year: Not on file   Transportation Needs:    • Lack of Transportation (Medical): Not on file   • Lack of Transportation (Non-Medical): Not on file   Physical Activity:    • Days of Exercise per Week: Not on file   • Minutes of Exercise per Session: Not on file   Stress:    • Feeling of Stress : Not on file   Social Connections:    • Frequency of Communication with Friends and Family: Not on file   • Frequency of Social Gatherings with Friends and Family: Not on file   • Attends Judaism Services: Not on file   • Active Member of Clubs or Organizations: Not on file   • Attends Club or Organization Meetings: Not on file   • Marital Status: Not on file   Intimate Partner Violence:    • Fear of  Current or Ex-Partner: Not on file   • Emotionally Abused: Not on file   • Physically Abused: Not on file   • Sexually Abused: Not on file   Housing Stability:    • Unable to Pay for Housing in the Last Year: Not on file   • Number of Places Lived in the Last Year: Not on file   • Unstable Housing in the Last Year: Not on file         Physical Exam:  Ambulatory Vitals  /64 (BP Location: Left arm, Patient Position: Sitting, BP Cuff Size: Adult)   Pulse 83   Resp 14   Ht 1.829 m (6')   Wt 77.6 kg (171 lb)   SpO2 100%    BP Readings from Last 4 Encounters:   01/24/22 112/64   11/28/21 100/60   11/23/21 108/54   09/09/21 122/64     Weight/BMI:   Vitals:    01/24/22 0921   BP: 112/64   Weight: 77.6 kg (171 lb)   Height: 1.829 m (6')    Body mass index is 23.19 kg/m².  Wt Readings from Last 4 Encounters:   01/24/22 77.6 kg (171 lb)   11/28/21 76.2 kg (168 lb)   11/23/21 78 kg (172 lb)   09/09/21 76.7 kg (169 lb)       Physical Exam  Constitutional:       General: He is not in acute distress.  HENT:      Head: Normocephalic and atraumatic.   Eyes:      Conjunctiva/sclera: Conjunctivae normal.      Pupils: Pupils are equal, round, and reactive to light.   Neck:      Vascular: No JVD.   Cardiovascular:      Rate and Rhythm: Normal rate and regular rhythm.      Heart sounds: Normal heart sounds. No murmur heard.   No friction rub. No gallop.    Pulmonary:      Effort: Pulmonary effort is normal. No respiratory distress.      Breath sounds: Normal breath sounds. No wheezing or rales.   Chest:      Chest wall: No tenderness.   Abdominal:      General: Bowel sounds are normal. There is no distension.      Palpations: Abdomen is soft.   Musculoskeletal:      Cervical back: Normal range of motion and neck supple.   Skin:     General: Skin is warm and dry.   Neurological:      Mental Status: He is alert and oriented to person, place, and time.   Psychiatric:         Mood and Affect: Affect normal.         Judgment:  Judgment normal.         Lab Data Review:  Lab Results   Component Value Date/Time    CHOLSTRLTOT 144 11/10/2021 06:52 AM    CHOLSTRLTOT 189 07/06/2021 03:40 AM     (H) 07/06/2021 03:40 AM    HDL 43 11/10/2021 06:52 AM    HDL 41 07/06/2021 03:40 AM    TRIGLYCERIDE 91 11/10/2021 06:52 AM    TRIGLYCERIDE 164 (H) 07/06/2021 03:40 AM       Lab Results   Component Value Date/Time    SODIUM 141 11/10/2021 06:52 AM    SODIUM 138 07/06/2021 03:40 AM    POTASSIUM 5.1 11/10/2021 06:52 AM    POTASSIUM 4.1 07/06/2021 03:40 AM    CHLORIDE 103 11/10/2021 06:52 AM    CHLORIDE 103 07/06/2021 03:40 AM    CO2 25 11/10/2021 06:52 AM    CO2 26 07/06/2021 03:40 AM    GLUCOSE 183 (H) 11/10/2021 06:52 AM    GLUCOSE 154 (H) 07/06/2021 03:40 AM    BUN 11 11/10/2021 06:52 AM    BUN 10 07/06/2021 03:40 AM    CREATININE 0.80 11/10/2021 06:52 AM    CREATININE 0.72 07/06/2021 03:40 AM    CREATININE 0.9 05/09/2007 01:20 AM    BUNCREATRAT 14 11/10/2021 06:52 AM     CrCl cannot be calculated (Patient's most recent lab result is older than the maximum 7 days allowed.).  Lab Results   Component Value Date/Time    ALKPHOSPHAT 70 07/06/2021 03:40 AM    ASTSGOT 21 07/06/2021 03:40 AM    ALTSGPT 12 07/06/2021 03:40 AM    TBILIRUBIN 0.3 07/06/2021 03:40 AM      Lab Results   Component Value Date/Time    WBC 4.3 (L) 07/06/2021 03:40 AM     Lab Results   Component Value Date/Time    HBA1C 8.6 (H) 11/10/2021 06:52 AM    HBA1C 7.3 (H) 01/05/2021 07:20 AM     No components found for: TROP    Outside a1c 6.3 noted by Dr Mcnally 10/16/20    Cardiac Imaging and Procedures Review:      EKG 7/2021 sinus rhythm 83 bpm    zio monitor 8/2021  Procedure: zio monitor 13 days 21 hours   Indication: TIA   Quality: good   Findings:   Underlying rhythm: Predominantly sinus rhythm; average rate 82 bpm. No atrial fibrillation nor flutter detected.   Atrial events: Rare ectopy. 3 episodes of supraventricular tachycardia (SVT); fastest 174 bpm which was also longest  at 9 beats.   Ventricular events: Rare ectopy.   Patient events: Patient triggered symptoms associated with sinus rhythm. Asymptomatic, short SVT episodes.   Impressions:   Predominantly sinus rhythm.   Patient triggered symptoms associated with sinus rhythm.   Asymptomatic, short SVT episodes.   Rare ectopy.     TTE 1/11/2021  CONCLUSIONS  Normal left ventricular size, wall thickness, and systolic function.  Normal right ventricular size and systolic function.  No significant valvular pathology.  Normal pericardium without effusion.     Compared to the images of the prior study done 12/18/19, left   ventricular systolic function has recovered.    Cardiac MRI 7/6/20  1. Diffuse subendocardial late gadolinium enhancement involving the apex, apical septal, apical inferior, apical lateral, apical anterior, in keeping with prior infarct.     The apex is thinning with more than 75% enhancement, likely transmural infarct with no viability.     The other apical septal, apical inferior, apical lateral, apical anterior segments have 40-60% late gadolinium enhancement of the myocardial thickness.     2. Akinesis of the apical septum and apical segments. Hypokinesis of the apical anterior segment. Grossly preserved contraction in the apical lateral and apical inferior segments.     Ejection fraction of the left ventricle is 46 %, similar to prior.     3. No thrombus in the left ventricular apex.    TTE 12/18/2019  CONCLUSIONS  Limited Exam for LV Apical Thrombus.   No thrombus. Mildly reduced left ventricular systolic function.  Left ventricular ejection fraction is visually estimated to be 45%.  There is apical septum and apical akinesis.    TTE 12/16/19  CONCLUSIONS  Severely reduced left ventricular systolic function. Left ventricular   ejection fraction is visually estimated to be 30-35%.   There appears to be a linear echodensity which is adjacent to the LV   apex concerning for possible small LV thrombus. May consider  repeat   limted echo in 1-2 days for re-evaluation if clinically indicated.    Normal inferior vena cava size and inspiratory collapse.  Indeterminate diastolic function.  Aortic sclerosis without stenosis.     [facility] 12/2019  Coronary Diagnostic Findings    * Left main: Distal tapering with 50% stenosis.    * Left anterior descending: Diffuse atherosclerosis and negative remodeling  beginning at the origin.  There are sequential 70% stenosis in the proximal  and mid segment of the vessel.  There is subtotal occlusion in the mid to  distal segment with AGATHA II flow into the apical LAD. The first diagonal has  proximal 70% stenosis and is diffusely diseased and negatively remodeled with  95% stenosis in the lower branch.    * Left circumflex: Mid vessel 70-80% stenosis.  The OM1 has diffuse  atherosclerosis there is less than 1.5 mm in size and without significant  narrowing.  The OM 2 bifurcates proximally the upper branch has proximal 50%  stenosis and is a less than 1.5 mm vessel.  The lower branch is a 2.25 mm  vessel and has proximal 85% stenosis.    * Right coronary artery: Dominant, diffuse 60% stenosis in the proximal mid  and distal AV groove.  There is a dual PDA.  The first PDA has a mid 80%  stenosis and diffuse disease downstream.  The second PDA has proximal 80%  stenosis.  Conclusions    1. Severe LV systolic dysfunction by noninvasive evaluation.    2. Severe and diffuse obstructive three-vessel coronary artery disease.    3. Normal LVEDP.    Medical Decision Making:  Problem List Items Addressed This Visit     Hyperlipidemia    Relevant Medications    ezetimibe (ZETIA) 10 MG Tab    Other Relevant Orders    Lipid Profile    CAD (coronary artery disease)    Relevant Medications    ezetimibe (ZETIA) 10 MG Tab    HFrEF (heart failure with reduced ejection fraction) ([facility])    Relevant Medications    ezetimibe (ZETIA) 10 MG Tab    PVD (peripheral vascular disease) ([facility])    Relevant Medications    ezetimibe  (ZETIA) 10 MG Tab        ICM, HFrecEF, NYHA I, stage C - recovered systolic function; may have had a component of stress cardiomyopathy which improved with osteomyelitis. Continue current medications. Cannot tolerate Ace/arb due to hypotension.    Severe CAD/PAD/HLD - previously on aspirin 81mg and xarelto 2.5mg bid; due to cost now on DAPT. Continue statin. Add zetia. Repeat lipids just prior to next visit. Goal LDL <70 and trig <150. Consider pcsk9 if remains above threshold. Rediscuss revascularization if becomes symptomatic or LVEF worsens.    Dm2 - please consider SGLT2i +/- GLP1a. Has follow up with endocrinology.     It was my pleasure to meet with Mr. Aviles.                        Physical Exam  Constitutional:       General: He is not in acute distress.  HENT:      Head: Normocephalic and atraumatic.   Eyes:      Conjunctiva/sclera: Conjunctivae normal.      Pupils: Pupils are equal, round, and reactive to light.   Neck:      Vascular: No JVD.   Cardiovascular:      Rate and Rhythm: Normal rate and regular rhythm.      Heart sounds: Normal heart sounds. No murmur heard.  No friction rub. No gallop.    Pulmonary:      Effort: Pulmonary effort is normal. No respiratory distress.      Breath sounds: Normal breath sounds. No wheezing or rales.   Chest:      Chest wall: No tenderness.   Abdominal:      General: Bowel sounds are normal. There is no distension.      Palpations: Abdomen is soft.   Musculoskeletal:      Cervical back: Normal range of motion and neck supple.   Skin:     General: Skin is warm and dry.   Neurological:      Mental Status: He is alert and oriented to person, place, and time.   Psychiatric:         Mood and Affect: Affect normal.         Judgment: Judgment normal.         Lab Data Review:  Lab Results   Component Value Date/Time    CHOLSTRLTOT 144 11/10/2021 06:52 AM    CHOLSTRLTOT 189 07/06/2021 03:40 AM     (H) 07/06/2021 03:40 AM    HDL 43 11/10/2021 06:52 AM    HDL 41  07/06/2021 03:40 AM    TRIGLYCERIDE 91 11/10/2021 06:52 AM    TRIGLYCERIDE 164 (H) 07/06/2021 03:40 AM       Lab Results   Component Value Date/Time    SODIUM 141 11/10/2021 06:52 AM    SODIUM 138 07/06/2021 03:40 AM    POTASSIUM 5.1 11/10/2021 06:52 AM    POTASSIUM 4.1 07/06/2021 03:40 AM    CHLORIDE 103 11/10/2021 06:52 AM    CHLORIDE 103 07/06/2021 03:40 AM    CO2 25 11/10/2021 06:52 AM    CO2 26 07/06/2021 03:40 AM    GLUCOSE 183 (H) 11/10/2021 06:52 AM    GLUCOSE 154 (H) 07/06/2021 03:40 AM    BUN 11 11/10/2021 06:52 AM    BUN 10 07/06/2021 03:40 AM    CREATININE 0.80 11/10/2021 06:52 AM    CREATININE 0.72 07/06/2021 03:40 AM    CREATININE 0.9 05/09/2007 01:20 AM    BUNCREATRAT 14 11/10/2021 06:52 AM     CrCl cannot be calculated (Patient's most recent lab result is older than the maximum 7 days allowed.).  Lab Results   Component Value Date/Time    ALKPHOSPHAT 70 07/06/2021 03:40 AM    ASTSGOT 21 07/06/2021 03:40 AM    ALTSGPT 12 07/06/2021 03:40 AM    TBILIRUBIN 0.3 07/06/2021 03:40 AM      Lab Results   Component Value Date/Time    WBC 4.3 (L) 07/06/2021 03:40 AM     Lab Results   Component Value Date/Time    HBA1C 8.6 (H) 11/10/2021 06:52 AM    HBA1C 7.3 (H) 01/05/2021 07:20 AM     No components found for: TROP    Outside a1c 6.3 noted by Dr Mcnally 10/16/20    Cardiac Imaging and Procedures Review:      EKG 7/2021 sinus rhythm 83 bpm    zio monitor 8/2021  Procedure: zio monitor 13 days 21 hours   Indication: TIA   Quality: good   Findings:   Underlying rhythm: Predominantly sinus rhythm; average rate 82 bpm. No atrial fibrillation nor flutter detected.   Atrial events: Rare ectopy. 3 episodes of supraventricular tachycardia (SVT); fastest 174 bpm which was also longest at 9 beats.   Ventricular events: Rare ectopy.   Patient events: Patient triggered symptoms associated with sinus rhythm. Asymptomatic, short SVT episodes.   Impressions:   Predominantly sinus rhythm.   Patient triggered symptoms  associated with sinus rhythm.   Asymptomatic, short SVT episodes.   Rare ectopy.     TTE 1/11/2021  CONCLUSIONS  Normal left ventricular size, wall thickness, and systolic function.  Normal right ventricular size and systolic function.  No significant valvular pathology.  Normal pericardium without effusion.     Compared to the images of the prior study done 12/18/19, left   ventricular systolic function has recovered.    Cardiac MRI 7/6/20  1. Diffuse subendocardial late gadolinium enhancement involving the apex, apical septal, apical inferior, apical lateral, apical anterior, in keeping with prior infarct.     The apex is thinning with more than 75% enhancement, likely transmural infarct with no viability.     The other apical septal, apical inferior, apical lateral, apical anterior segments have 40-60% late gadolinium enhancement of the myocardial thickness.     2. Akinesis of the apical septum and apical segments. Hypokinesis of the apical anterior segment. Grossly preserved contraction in the apical lateral and apical inferior segments.     Ejection fraction of the left ventricle is 46 %, similar to prior.     3. No thrombus in the left ventricular apex.    TTE 12/18/2019  CONCLUSIONS  Limited Exam for LV Apical Thrombus.   No thrombus. Mildly reduced left ventricular systolic function.  Left ventricular ejection fraction is visually estimated to be 45%.  There is apical septum and apical akinesis.    TTE 12/16/19  CONCLUSIONS  Severely reduced left ventricular systolic function. Left ventricular   ejection fraction is visually estimated to be 30-35%.   There appears to be a linear echodensity which is adjacent to the LV   apex concerning for possible small LV thrombus. May consider repeat   limted echo in 1-2 days for re-evaluation if clinically indicated.    Normal inferior vena cava size and inspiratory collapse.  Indeterminate diastolic function.  Aortic sclerosis without stenosis.     [facility] 12/2019  Coronary  Diagnostic Findings    * Left main: Distal tapering with 50% stenosis.    * Left anterior descending: Diffuse atherosclerosis and negative remodeling  beginning at the origin.  There are sequential 70% stenosis in the proximal  and mid segment of the vessel.  There is subtotal occlusion in the mid to  distal segment with AGATHA II flow into the apical LAD. The first diagonal has  proximal 70% stenosis and is diffusely diseased and negatively remodeled with  95% stenosis in the lower branch.    * Left circumflex: Mid vessel 70-80% stenosis.  The OM1 has diffuse  atherosclerosis there is less than 1.5 mm in size and without significant  narrowing.  The OM 2 bifurcates proximally the upper branch has proximal 50%  stenosis and is a less than 1.5 mm vessel.  The lower branch is a 2.25 mm  vessel and has proximal 85% stenosis.    * Right coronary artery: Dominant, diffuse 60% stenosis in the proximal mid  and distal AV groove.  There is a dual PDA.  The first PDA has a mid 80%  stenosis and diffuse disease downstream.  The second PDA has proximal 80%  stenosis.  Conclusions    1. Severe LV systolic dysfunction by noninvasive evaluation.    2. Severe and diffuse obstructive three-vessel coronary artery disease.    3. Normal LVEDP.    Medical Decision Making:  Problem List Items Addressed This Visit     Hyperlipidemia    Relevant Medications    ezetimibe (ZETIA) 10 MG Tab    Other Relevant Orders    Lipid Profile    CAD (coronary artery disease)    Relevant Medications    ezetimibe (ZETIA) 10 MG Tab    HFrEF (heart failure with reduced ejection fraction) ([facility])    Relevant Medications    ezetimibe (ZETIA) 10 MG Tab    PVD (peripheral vascular disease) ([facility])    Relevant Medications    ezetimibe (ZETIA) 10 MG Tab        ICM, HFrecEF, NYHA I, stage C - recovered systolic function; may have had a component of stress cardiomyopathy which improved with osteomyelitis. Attempt titrate metoprolol to 12.5mg bid; previously did not  tolerate 25mg he recounts due to hypotension. Cannot tolerate Ace/arb due to hypotension.    Severe CAD/PAD/HLD - previously on aspirin 81mg and xarelto 2.5mg bid; due to cost issues now on DAPT. Continue statin. Annual lipids. Goal LDL <70 and trig <150. Repeat lipids. Consider pcsk9 if remains above goal. Rediscuss revascularization if becomes symptomatic or LVEF worsens.    Dm2 - please consider SGLT2i +/- GLP1a. Has endocrine visit pending.     It was my pleasure to meet with Mr. Aviles.

## 2022-01-24 NOTE — PATIENT INSTRUCTIONS
Ezetimibe Tablets  What is this medicine?  EZETIMIBE (ez ET i mibe) blocks the absorption of cholesterol from the stomach. It can help lower blood cholesterol for patients who are at risk of getting heart disease or a stroke. It is only for patients whose cholesterol level is not controlled by diet.  This medicine may be used for other purposes; ask your health care provider or pharmacist if you have questions.  COMMON BRAND NAME(S): John  What should I tell my health care provider before I take this medicine?  They need to know if you have any of these conditions:  · liver disease  · an unusual or allergic reaction to ezetimibe, medicines, foods, dyes, or preservatives  · pregnant or trying to get pregnant  · breast-feeding  How should I use this medicine?  Take this medicine by mouth with a glass of water. Follow the directions on the prescription label. This medicine can be taken with or without food. Take your doses at regular intervals. Do not take your medicine more often than directed.  Talk to your pediatrician regarding the use of this medicine in children. Special care may be needed.  Overdosage: If you think you have taken too much of this medicine contact a poison control center or emergency room at once.  NOTE: This medicine is only for you. Do not share this medicine with others.  What if I miss a dose?  If you miss a dose, take it as soon as you can. If it is almost time for your next dose, take only that dose. Do not take double or extra doses.  What may interact with this medicine?  Do not take this medicine with any of the following medications:  · fenofibrate  · gemfibrozil  This medicine may also interact with the following medications:  · antacids  · cyclosporine  · herbal medicines like red yeast rice  · other medicines to lower cholesterol or triglycerides  This list may not describe all possible interactions. Give your health care provider a list of all the medicines, herbs, non-prescription  drugs, or dietary supplements you use. Also tell them if you smoke, drink alcohol, or use illegal drugs. Some items may interact with your medicine.  What should I watch for while using this medicine?  Visit your doctor or health care professional for regular checks on your progress. You will need to have your cholesterol levels checked. If you are also taking some other cholesterol medicines, you will also need to have tests to make sure your liver is working properly.  Tell your doctor or health care professional if you get any unexplained muscle pain, tenderness, or weakness, especially if you also have a fever and tiredness.  You need to follow a low-cholesterol, low-fat diet while you are taking this medicine. This will decrease your risk of getting heart and blood vessel disease. Exercising and avoiding alcohol and smoking can also help. Ask your doctor or dietician for advice.  What side effects may I notice from receiving this medicine?  Side effects that you should report to your doctor or health care professional as soon as possible:  · allergic reactions like skin rash, itching or hives, swelling of the face, lips, or tongue  · dark yellow or brown urine  · unusually weak or tired  · yellowing of the skin or eyes  Side effects that usually do not require medical attention (report to your doctor or health care professional if they continue or are bothersome):  · diarrhea  · dizziness  · headache  · stomach upset or pain  This list may not describe all possible side effects. Call your doctor for medical advice about side effects. You may report side effects to FDA at 8-000-FDA-8074.  Where should I keep my medicine?  Keep out of the reach of children.  Store at room temperature between 15 and 30 degrees C (59 and 86 degrees F). Protect from moisture. Keep container tightly closed. Throw away any unused medicine after the expiration date.  NOTE: This sheet is a summary. It may not cover all possible  information. If you have questions about this medicine, talk to your doctor, pharmacist, or health care provider.  © 2020 [employer] Standard (2013-06-24 15:39:09)

## 2022-01-28 ENCOUNTER — TELEPHONE (OUTPATIENT)
Dept: CARDIOLOGY | Facility: MEDICAL CENTER | Age: 57
End: 2022-01-28

## 2022-01-28 NOTE — TELEPHONE ENCOUNTER
Israel Aviles Key: [id number] - PA Case ID: [id number] - Rx #: [id number] help? Call us at [phone]  Status  Sent to [employer]  Drug  Ezetimibe 10MG tablets  Form  [employer] Electronic PA Form (2017 NCPDP)  Original Claim Info  76,75

## 2022-01-31 NOTE — TELEPHONE ENCOUNTER
Israel Traci Key: [id number] - PA Case ID: [id number] - Rx #: [id number] help? Call us at [phone]  Outcome  Approvedon January 28  PA Case: [id number], Status: Approved, Coverage Starts on: 1/28/2022 12:00:00 AM, Coverage Ends on: 1/28/2023 12:00:00 AM.  Drug  Ezetimibe 10MG tablets  Form  [employer] Electronic PA Form (2017 NCPDP)  Original Claim Info  76,75

## 2022-02-17 ENCOUNTER — APPOINTMENT (OUTPATIENT)
Dept: RADIOLOGY | Facility: MEDICAL CENTER | Age: 57
DRG: 065 | End: 2022-02-17
Attending: STUDENT IN AN ORGANIZED HEALTH CARE EDUCATION/TRAINING PROGRAM
Payer: COMMERCIAL

## 2022-02-17 ENCOUNTER — APPOINTMENT (OUTPATIENT)
Dept: RADIOLOGY | Facility: MEDICAL CENTER | Age: 57
DRG: 065 | End: 2022-02-17
Attending: EMERGENCY MEDICINE
Payer: COMMERCIAL

## 2022-02-17 ENCOUNTER — HOSPITAL ENCOUNTER (INPATIENT)
Facility: MEDICAL CENTER | Age: 57
LOS: 6 days | DRG: 065 | End: 2022-02-24
Attending: EMERGENCY MEDICINE | Admitting: INTERNAL MEDICINE
Payer: COMMERCIAL

## 2022-02-17 DIAGNOSIS — G98.8 DISORDER OF NERVOUS SYSTEM DUE TO TYPE 2 DIABETES MELLITUS (HCC): ICD-10-CM

## 2022-02-17 DIAGNOSIS — R29.90 STROKE-LIKE SYMPTOMS: ICD-10-CM

## 2022-02-17 DIAGNOSIS — G45.9 TIA (TRANSIENT ISCHEMIC ATTACK): ICD-10-CM

## 2022-02-17 DIAGNOSIS — E11.69 DISORDER OF NERVOUS SYSTEM DUE TO TYPE 2 DIABETES MELLITUS (HCC): ICD-10-CM

## 2022-02-17 DIAGNOSIS — E11.42 DIABETIC POLYNEUROPATHY ASSOCIATED WITH TYPE 2 DIABETES MELLITUS (HCC): ICD-10-CM

## 2022-02-17 DIAGNOSIS — I63.9 ACUTE ISCHEMIC STROKE (HCC): ICD-10-CM

## 2022-02-17 PROBLEM — R29.818 NEUROLOGICAL DEFICIT PRESENT: Status: ACTIVE | Noted: 2022-02-17

## 2022-02-17 LAB
ABO GROUP BLD: NORMAL
ALBUMIN SERPL BCP-MCNC: 3.4 G/DL (ref 3.2–4.9)
ALBUMIN/GLOB SERPL: 1.2 G/DL
ALP SERPL-CCNC: 59 U/L (ref 30–99)
ALT SERPL-CCNC: 13 U/L (ref 2–50)
ANION GAP SERPL CALC-SCNC: 10 MMOL/L (ref 7–16)
APTT PPP: 32.8 SEC (ref 24.7–36)
AST SERPL-CCNC: 19 U/L (ref 12–45)
BASOPHILS # BLD AUTO: 0.7 % (ref 0–1.8)
BASOPHILS # BLD: 0.03 K/UL (ref 0–0.12)
BILIRUB SERPL-MCNC: 0.3 MG/DL (ref 0.1–1.5)
BLD GP AB SCN SERPL QL: NORMAL
BUN SERPL-MCNC: 15 MG/DL (ref 8–22)
CALCIUM SERPL-MCNC: 7.7 MG/DL (ref 8.5–10.5)
CHLORIDE SERPL-SCNC: 110 MMOL/L (ref 96–112)
CHOLEST SERPL-MCNC: 137 MG/DL (ref 100–199)
CO2 SERPL-SCNC: 23 MMOL/L (ref 20–33)
CREAT SERPL-MCNC: 0.59 MG/DL (ref 0.5–1.4)
EKG IMPRESSION: NORMAL
EOSINOPHIL # BLD AUTO: 0.06 K/UL (ref 0–0.51)
EOSINOPHIL NFR BLD: 1.4 % (ref 0–6.9)
ERYTHROCYTE [DISTWIDTH] IN BLOOD BY AUTOMATED COUNT: 44.5 FL (ref 35.9–50)
EST. AVERAGE GLUCOSE BLD GHB EST-MCNC: 192 MG/DL
GLOBULIN SER CALC-MCNC: 2.8 G/DL (ref 1.9–3.5)
GLUCOSE BLD-MCNC: 147 MG/DL (ref 65–99)
GLUCOSE BLD-MCNC: 166 MG/DL (ref 65–99)
GLUCOSE BLD-MCNC: 262 MG/DL (ref 65–99)
GLUCOSE SERPL-MCNC: 151 MG/DL (ref 65–99)
HBA1C MFR BLD: 8.3 % (ref 4–5.6)
HCT VFR BLD AUTO: 34.8 % (ref 42–52)
HDLC SERPL-MCNC: 43 MG/DL
HGB BLD-MCNC: 11.3 G/DL (ref 14–18)
IMM GRANULOCYTES # BLD AUTO: 0.01 K/UL (ref 0–0.11)
IMM GRANULOCYTES NFR BLD AUTO: 0.2 % (ref 0–0.9)
INR PPP: 1.05 (ref 0.87–1.13)
LDLC SERPL CALC-MCNC: 65 MG/DL
LYMPHOCYTES # BLD AUTO: 1.85 K/UL (ref 1–4.8)
LYMPHOCYTES NFR BLD: 43.8 % (ref 22–41)
MCH RBC QN AUTO: 30.5 PG (ref 27–33)
MCHC RBC AUTO-ENTMCNC: 32.5 G/DL (ref 33.7–35.3)
MCV RBC AUTO: 94.1 FL (ref 81.4–97.8)
MONOCYTES # BLD AUTO: 0.39 K/UL (ref 0–0.85)
MONOCYTES NFR BLD AUTO: 9.2 % (ref 0–13.4)
NEUTROPHILS # BLD AUTO: 1.88 K/UL (ref 1.82–7.42)
NEUTROPHILS NFR BLD: 44.7 % (ref 44–72)
NRBC # BLD AUTO: 0 K/UL
NRBC BLD-RTO: 0 /100 WBC
PLATELET # BLD AUTO: 147 K/UL (ref 164–446)
PMV BLD AUTO: 10.1 FL (ref 9–12.9)
POTASSIUM SERPL-SCNC: 3.7 MMOL/L (ref 3.6–5.5)
PROT SERPL-MCNC: 6.2 G/DL (ref 6–8.2)
PROTHROMBIN TIME: 13.4 SEC (ref 12–14.6)
RBC # BLD AUTO: 3.7 M/UL (ref 4.7–6.1)
RH BLD: NORMAL
SODIUM SERPL-SCNC: 143 MMOL/L (ref 135–145)
TRIGL SERPL-MCNC: 143 MG/DL (ref 0–149)
TROPONIN T SERPL-MCNC: 9 NG/L (ref 6–19)
WBC # BLD AUTO: 4.2 K/UL (ref 4.8–10.8)

## 2022-02-17 PROCEDURE — 70496 CT ANGIOGRAPHY HEAD: CPT

## 2022-02-17 PROCEDURE — 80053 COMPREHEN METABOLIC PANEL: CPT

## 2022-02-17 PROCEDURE — 83036 HEMOGLOBIN GLYCOSYLATED A1C: CPT

## 2022-02-17 PROCEDURE — A9270 NON-COVERED ITEM OR SERVICE: HCPCS | Performed by: STUDENT IN AN ORGANIZED HEALTH CARE EDUCATION/TRAINING PROGRAM

## 2022-02-17 PROCEDURE — 85610 PROTHROMBIN TIME: CPT

## 2022-02-17 PROCEDURE — 84484 ASSAY OF TROPONIN QUANT: CPT

## 2022-02-17 PROCEDURE — 700117 HCHG RX CONTRAST REV CODE 255: Performed by: EMERGENCY MEDICINE

## 2022-02-17 PROCEDURE — G0378 HOSPITAL OBSERVATION PER HR: HCPCS

## 2022-02-17 PROCEDURE — 70450 CT HEAD/BRAIN W/O DYE: CPT

## 2022-02-17 PROCEDURE — 70551 MRI BRAIN STEM W/O DYE: CPT

## 2022-02-17 PROCEDURE — 96372 THER/PROPH/DIAG INJ SC/IM: CPT

## 2022-02-17 PROCEDURE — 700102 HCHG RX REV CODE 250 W/ 637 OVERRIDE(OP): Performed by: STUDENT IN AN ORGANIZED HEALTH CARE EDUCATION/TRAINING PROGRAM

## 2022-02-17 PROCEDURE — 99215 OFFICE O/P EST HI 40 MIN: CPT | Performed by: PSYCHIATRY & NEUROLOGY

## 2022-02-17 PROCEDURE — 700111 HCHG RX REV CODE 636 W/ 250 OVERRIDE (IP): Performed by: STUDENT IN AN ORGANIZED HEALTH CARE EDUCATION/TRAINING PROGRAM

## 2022-02-17 PROCEDURE — 82962 GLUCOSE BLOOD TEST: CPT | Mod: 91

## 2022-02-17 PROCEDURE — 86901 BLOOD TYPING SEROLOGIC RH(D): CPT

## 2022-02-17 PROCEDURE — 85730 THROMBOPLASTIN TIME PARTIAL: CPT

## 2022-02-17 PROCEDURE — 85025 COMPLETE CBC W/AUTO DIFF WBC: CPT

## 2022-02-17 PROCEDURE — 93005 ELECTROCARDIOGRAM TRACING: CPT | Performed by: EMERGENCY MEDICINE

## 2022-02-17 PROCEDURE — 36415 COLL VENOUS BLD VENIPUNCTURE: CPT

## 2022-02-17 PROCEDURE — 0042T CT-CEREBRAL PERFUSION ANALYSIS: CPT

## 2022-02-17 PROCEDURE — 71045 X-RAY EXAM CHEST 1 VIEW: CPT

## 2022-02-17 PROCEDURE — 72141 MRI NECK SPINE W/O DYE: CPT

## 2022-02-17 PROCEDURE — 94760 N-INVAS EAR/PLS OXIMETRY 1: CPT

## 2022-02-17 PROCEDURE — 99285 EMERGENCY DEPT VISIT HI MDM: CPT

## 2022-02-17 PROCEDURE — 86900 BLOOD TYPING SEROLOGIC ABO: CPT

## 2022-02-17 PROCEDURE — 86850 RBC ANTIBODY SCREEN: CPT

## 2022-02-17 PROCEDURE — 80061 LIPID PANEL: CPT

## 2022-02-17 PROCEDURE — 70498 CT ANGIOGRAPHY NECK: CPT

## 2022-02-17 PROCEDURE — 99218 PR INITIAL OBSERVATION CARE,LEVL I: CPT | Mod: GC | Performed by: FAMILY MEDICINE

## 2022-02-17 RX ORDER — ATORVASTATIN CALCIUM 80 MG/1
80 TABLET, FILM COATED ORAL DAILY
Status: DISCONTINUED | OUTPATIENT
Start: 2022-02-18 | End: 2022-02-24 | Stop reason: HOSPADM

## 2022-02-17 RX ORDER — CLOPIDOGREL BISULFATE 75 MG/1
75 TABLET ORAL DAILY
Status: DISCONTINUED | OUTPATIENT
Start: 2022-02-17 | End: 2022-02-24 | Stop reason: HOSPADM

## 2022-02-17 RX ORDER — PROCHLORPERAZINE EDISYLATE 5 MG/ML
5-10 INJECTION INTRAMUSCULAR; INTRAVENOUS EVERY 4 HOURS PRN
Status: DISCONTINUED | OUTPATIENT
Start: 2022-02-17 | End: 2022-02-24 | Stop reason: HOSPADM

## 2022-02-17 RX ORDER — ONDANSETRON 4 MG/1
4 TABLET, ORALLY DISINTEGRATING ORAL EVERY 4 HOURS PRN
Status: DISCONTINUED | OUTPATIENT
Start: 2022-02-17 | End: 2022-02-24 | Stop reason: HOSPADM

## 2022-02-17 RX ORDER — POLYETHYLENE GLYCOL 3350 17 G/17G
1 POWDER, FOR SOLUTION ORAL
Status: DISCONTINUED | OUTPATIENT
Start: 2022-02-17 | End: 2022-02-24 | Stop reason: HOSPADM

## 2022-02-17 RX ORDER — BISACODYL 10 MG
10 SUPPOSITORY, RECTAL RECTAL
Status: DISCONTINUED | OUTPATIENT
Start: 2022-02-17 | End: 2022-02-24 | Stop reason: HOSPADM

## 2022-02-17 RX ORDER — METOPROLOL SUCCINATE 25 MG/1
12.5 TABLET, EXTENDED RELEASE ORAL 2 TIMES DAILY
Status: DISCONTINUED | OUTPATIENT
Start: 2022-02-17 | End: 2022-02-24 | Stop reason: HOSPADM

## 2022-02-17 RX ORDER — LORAZEPAM 1 MG/1
1 TABLET ORAL ONCE
Status: ACTIVE | OUTPATIENT
Start: 2022-02-17 | End: 2022-02-18

## 2022-02-17 RX ORDER — ONDANSETRON 2 MG/ML
4 INJECTION INTRAMUSCULAR; INTRAVENOUS EVERY 4 HOURS PRN
Status: DISCONTINUED | OUTPATIENT
Start: 2022-02-17 | End: 2022-02-24 | Stop reason: HOSPADM

## 2022-02-17 RX ORDER — AMOXICILLIN 250 MG
2 CAPSULE ORAL 2 TIMES DAILY
Status: DISCONTINUED | OUTPATIENT
Start: 2022-02-17 | End: 2022-02-24 | Stop reason: HOSPADM

## 2022-02-17 RX ORDER — PROMETHAZINE HYDROCHLORIDE 25 MG/1
12.5-25 TABLET ORAL EVERY 4 HOURS PRN
Status: DISCONTINUED | OUTPATIENT
Start: 2022-02-17 | End: 2022-02-24 | Stop reason: HOSPADM

## 2022-02-17 RX ORDER — GLIPIZIDE 5 MG/1
2.5 TABLET ORAL 2 TIMES DAILY WITH MEALS
Status: ON HOLD | COMMUNITY
End: 2022-02-24

## 2022-02-17 RX ORDER — LABETALOL HYDROCHLORIDE 5 MG/ML
10 INJECTION, SOLUTION INTRAVENOUS EVERY 4 HOURS PRN
Status: DISCONTINUED | OUTPATIENT
Start: 2022-02-17 | End: 2022-02-24 | Stop reason: HOSPADM

## 2022-02-17 RX ORDER — PROMETHAZINE HYDROCHLORIDE 25 MG/1
12.5-25 SUPPOSITORY RECTAL EVERY 4 HOURS PRN
Status: DISCONTINUED | OUTPATIENT
Start: 2022-02-17 | End: 2022-02-24 | Stop reason: HOSPADM

## 2022-02-17 RX ORDER — DEXTROSE MONOHYDRATE 25 G/50ML
50 INJECTION, SOLUTION INTRAVENOUS
Status: DISCONTINUED | OUTPATIENT
Start: 2022-02-17 | End: 2022-02-24 | Stop reason: HOSPADM

## 2022-02-17 RX ADMIN — ENOXAPARIN SODIUM 40 MG: 40 INJECTION SUBCUTANEOUS at 14:32

## 2022-02-17 RX ADMIN — METOPROLOL SUCCINATE 12.5 MG: 25 TABLET, EXTENDED RELEASE ORAL at 17:28

## 2022-02-17 RX ADMIN — IOHEXOL 100 ML: 350 INJECTION, SOLUTION INTRAVENOUS at 10:16

## 2022-02-17 RX ADMIN — INSULIN HUMAN 5 UNITS: 100 INJECTION, SOLUTION PARENTERAL at 17:28

## 2022-02-17 RX ADMIN — ASPIRIN 81 MG: 81 TABLET, COATED ORAL at 17:27

## 2022-02-17 RX ADMIN — IOHEXOL 40 ML: 350 INJECTION, SOLUTION INTRAVENOUS at 10:18

## 2022-02-17 RX ADMIN — CLOPIDOGREL BISULFATE 75 MG: 75 TABLET ORAL at 17:28

## 2022-02-17 ASSESSMENT — LIFESTYLE VARIABLES
CONSUMPTION TOTAL: INCOMPLETE
HOW MANY TIMES IN THE PAST YEAR HAVE YOU HAD 5 OR MORE DRINKS IN A DAY: 0
AVERAGE NUMBER OF DAYS PER WEEK YOU HAVE A DRINK CONTAINING ALCOHOL: 0
TOTAL SCORE: 0
HAVE PEOPLE ANNOYED YOU BY CRITICIZING YOUR DRINKING: NO
CONSUMPTION TOTAL: NEGATIVE
ALCOHOL_USE: NO
TOTAL SCORE: 0
ON A TYPICAL DAY WHEN YOU DRINK ALCOHOL HOW MANY DRINKS DO YOU HAVE: 0
TOTAL SCORE: 0
EVER HAD A DRINK FIRST THING IN THE MORNING TO STEADY YOUR NERVES TO GET RID OF A HANGOVER: NO
EVER HAD A DRINK FIRST THING IN THE MORNING TO STEADY YOUR NERVES TO GET RID OF A HANGOVER: NO
TOTAL SCORE: 0
HAVE YOU EVER FELT YOU SHOULD CUT DOWN ON YOUR DRINKING: NO
ALCOHOL_USE: NO
EVER FELT BAD OR GUILTY ABOUT YOUR DRINKING: NO
HAVE PEOPLE ANNOYED YOU BY CRITICIZING YOUR DRINKING: NO
HAVE YOU EVER FELT YOU SHOULD CUT DOWN ON YOUR DRINKING: NO
ALCOHOL_USE: NO
ALCOHOL_USE: NO
EVER FELT BAD OR GUILTY ABOUT YOUR DRINKING: NO

## 2022-02-17 ASSESSMENT — FIBROSIS 4 INDEX
FIB4 SCORE: 2.29
FIB4 SCORE: 2.01

## 2022-02-17 ASSESSMENT — PAIN DESCRIPTION - PAIN TYPE
TYPE: OTHER (COMMENT)
TYPE: OTHER (COMMENT)
TYPE: ACUTE PAIN

## 2022-02-17 NOTE — NON-PROVIDER
"FAMILY MEDICINE HISTORY AND PHYSICAL      Patient ID:   Name:             Israel Aviles   YOB: 1965  Age:                 56 y.o.  male   MRN:               [id number]    Attending Physician:  Bandar Doss  Senior Resident: Dr. Brownlee  Parveen Resident:  Dr. Grey    Chief Complaint:      Left-sided weakness    History of Present Illness:   Manan is a 55 y/o male w/ hx of HTN, HLD, TIIDM, CAD, and prior left frontal stroke (2018) who presented today for left-sided weakness beginning this AM 0900 upon awakening. He went to bed at 0200 last night without any weakness and, upon awakening this morning, reports that he had a hard time lifting his left arm/leg and with using them to prop himself up in bed. He and his partner both deny any slurred speech or facial droop. He reports that he had a similar episode last year at which time he was told that he had a TIA and was possibly given a thrombolytic. He denies any smoking history or history of cardiac and that he is established with a local cardiologist with close followup.     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   Denies additional surgical history    Current Outpatient Medications:  Home Medications     Reviewed by Dandy Beebe (Pharmacy Tech) on 02/17/22 at 1217  Med List Status: Complete   Medication Last Dose Status   aspirin 81 MG EC tablet 2/16/2022 Active   atorvastatin (LIPITOR) 80 MG tablet 2/17/2022 Active   clopidogrel (PLAVIX) 75 MG Tab 2/16/2022 Active   glipiZIDE (GLUCOTROL) 5 MG Tab 2/16/2022 Active   metFORMIN (GLUCOPHAGE) 500 MG Tab 2/16/2022 Active   metoprolol SR (TOPROL XL) 25 MG TABLET SR 24 HR 2/16/2022 Active   multivitamin (THERAGRAN) Tab 2/16/2022 Active "                Medication Allergy:  No Known Allergies    Family History:  Paternal history of TIIDM.     Social History:  Smoking: denies   Alcohol: denies   Illicit drugs: denies   Living situation: with wife in [place].            Physical Exam:  /81   Pulse 72   Temp 35.9 °C (96.7 °F) (Temporal)   Resp 14   Ht 1.829 m (6')   Wt 77.6 kg (171 lb 1.2 oz)   SpO2 99%   BMI 23.20 kg/m²   Vitals:    02/17/22 1011 02/17/22 1030 02/17/22 1059 02/17/22 1159   BP: 151/84 154/89 146/88 143/81   Pulse: 80 76 72 72   Resp:  15 12 14   Temp:       TempSrc:       SpO2: 98% 96% 96% 99%   Weight:       Height:         Oxygen Therapy:  Pulse Oximetry: 99 %, O2 Delivery Device: None - Room Air    Constitutional:   Well developed, Well nourished, No acute distress, Non-toxic appearance.   HENMT:  Normocephalic, Atraumatic.  Eyes:  PERRL, EOMI, Conjunctiva normal, No discharge.  Neck:  Normal range of motion, No stridor.  Cardiovascular:  Normal heart rate, Normal rhythm, No murmurs, No rubs, No gallops. No cyanosis, clubbing or edema.  Lungs:  Respiratory effort is normal. Normal breath sounds, breath sounds clear to auscultation bilaterally, no rales, no rhonchi, no wheezing.   Abdomen: Bowel sounds normal, Soft, No tenderness, No guarding, No rebound.  Skin: Warm, Dry, No erythema, No evident rash.  Neurologic:  Arm strength is 4/5 compared to right in elbow flexion, extension and in shoulder abduction. Hip flexion is 5/5 compared to contralateral side. Knee extension and flexion are 5/5 compared to contralateral side. Sensation intact to light touch throughout arms, and legs, bilaterally. CN II-XI intact without deficit.       Lab Data Review:  Recent Results (from the past 24 hour(s))   CBC WITH DIFFERENTIAL    Collection Time: 02/17/22  9:46 AM   Result Value Ref Range    WBC 4.2 (L) 4.8 - 10.8 K/uL    RBC 3.70 (L) 4.70 - 6.10 M/uL    Hemoglobin 11.3 (L) 14.0 - 18.0 g/dL    Hematocrit 34.8 (L) 42.0 - 52.0 %    MCV  94.1 81.4 - 97.8 fL    MCH 30.5 27.0 - 33.0 pg    MCHC 32.5 (L) 33.7 - 35.3 g/dL    RDW 44.5 35.9 - 50.0 fL    Platelet Count 147 (L) 164 - 446 K/uL    MPV 10.1 9.0 - 12.9 fL    Neutrophils-Polys 44.70 44.00 - 72.00 %    Lymphocytes 43.80 (H) 22.00 - 41.00 %    Monocytes 9.20 0.00 - 13.40 %    Eosinophils 1.40 0.00 - 6.90 %    Basophils 0.70 0.00 - 1.80 %    Immature Granulocytes 0.20 0.00 - 0.90 %    Nucleated RBC 0.00 /100 WBC    Neutrophils (Absolute) 1.88 1.82 - 7.42 K/uL    Lymphs (Absolute) 1.85 1.00 - 4.80 K/uL    Monos (Absolute) 0.39 0.00 - 0.85 K/uL    Eos (Absolute) 0.06 0.00 - 0.51 K/uL    Baso (Absolute) 0.03 0.00 - 0.12 K/uL    Immature Granulocytes (abs) 0.01 0.00 - 0.11 K/uL    NRBC (Absolute) 0.00 K/uL   COMP METABOLIC PANEL    Collection Time: 02/17/22  9:46 AM   Result Value Ref Range    Sodium 143 135 - 145 mmol/L    Potassium 3.7 3.6 - 5.5 mmol/L    Chloride 110 96 - 112 mmol/L    Co2 23 20 - 33 mmol/L    Anion Gap 10.0 7.0 - 16.0    Glucose 151 (H) 65 - 99 mg/dL    Bun 15 8 - 22 mg/dL    Creatinine 0.59 0.50 - 1.40 mg/dL    Calcium 7.7 (L) 8.5 - 10.5 mg/dL    AST(SGOT) 19 12 - 45 U/L    ALT(SGPT) 13 2 - 50 U/L    Alkaline Phosphatase 59 30 - 99 U/L    Total Bilirubin 0.3 0.1 - 1.5 mg/dL    Albumin 3.4 3.2 - 4.9 g/dL    Total Protein 6.2 6.0 - 8.2 g/dL    Globulin 2.8 1.9 - 3.5 g/dL    A-G Ratio 1.2 g/dL   PROTHROMBIN TIME    Collection Time: 02/17/22  9:46 AM   Result Value Ref Range    PT 13.4 12.0 - 14.6 sec    INR 1.05 0.87 - 1.13   APTT    Collection Time: 02/17/22  9:46 AM   Result Value Ref Range    APTT 32.8 24.7 - 36.0 sec   COD (ADULT)    Collection Time: 02/17/22  9:46 AM   Result Value Ref Range    ABO Grouping Only B     Rh Grouping Only POS     Antibody Screen-Cod NEG    TROPONIN    Collection Time: 02/17/22  9:46 AM   Result Value Ref Range    Troponin T 9 6 - 19 ng/L   ESTIMATED GFR    Collection Time: 02/17/22  9:46 AM   Result Value Ref Range    GFR If  >60 >60  mL/min/1.73 m 2    GFR If Non African American >60 >60 mL/min/1.73 m 2   EKG    Collection Time: 22 10:45 AM   Result Value Ref Range    Report       [facility]    Test Date:  2022  Pt Name:    ISRAEL AVILES              Department: ER  MRN:        [id number]                      Room:       [facility]  Gender:     Male                         Technician: HRR  :        1965                   Requested By:ER TRIAGE PROTOCOL  Order #:    [id number]                    Reading MD: LATOYA BURRIS MD    Measurements  Intervals                                Axis  Rate:       73                           P:          51  MS:         148                          QRS:        10  QRSD:       84                           T:          77  QT:         396  QTc:        437    Interpretive Statements  SINUS RHYTHM  BORDERLINE LOW VOLTAGE IN FRONTAL LEADS  Compared to ECG 2021 12:56:43  No significant changes  Electronically Signed On 2022 11:08:30 PST by LATOYA BURRIS MD         Imaging/Procedures Review:    CT Perfusion- demonstrates no ischemic core/penumbra mismatch  CTA H&N- demonstrates atherosclerosis of bilateral carotid at the bifurcations, L>R. Bilaterally less than 50% stenotic.   Noncontrast CT Head- no evidence for ischemic or hemorrhagic stroke. Focal hypodensities in the basal ganglia bilaterally may represent old lacunar infarcts.     EKG:   EKG reviewed which demonstrates no evidence of ST changes or evidence for acute ischemia.     Assessment and Plan:       Israel Aviles is 57 y/o male with history of multiple territory vasculopathy and stroke presenting for 4 hour history of focal left arm and leg weakness in the setting of a negative CT head and intact strength testing, suspicious for TIA possible right M2, posterior division.     #TIA  -Neurology saw patient upon arrival to ED, recommended MRI brain and C-spine for evaluation of possible  cord compression/for visualization/characterization of old infarct(s). Also recommends holding off with further cardiac monitoring.   PLAN  -MRI brain and C-spine  -No need for permissive hypertension currently.   -Continue with DAPT, Statin at current dosages for stroke optimization.   -q4 hour neuro checks.     #TIIDM  Recent A1C of 8.6 (11/21)  PLAN  -Hold at home glycemic regimen in favor of sliding scale insulin while inpatient.   -Goal of HbA1C 7 at home.     Dispo: Inpatient for continued monitoring.     Javier Almanza, MS3

## 2022-02-17 NOTE — ED TRIAGE NOTES
Chief Complaint   Patient presents with   • Weakness     LUE and LLE weakness     • Possible Stroke     BIBA with complaints of left sided weakness. LKW 0200  Left BKA secondary to diabetes complication    Pt to head CT

## 2022-02-17 NOTE — ED PROVIDER NOTES
ED Provider Note        Primary care provider: Donny Mcnally M.D.    I verified that the patient was wearing a mask and I was wearing appropriate PPE every time I entered the room. The patient's mask was on the patient at all times during my encounter except for a brief view of the oropharynx.      CHIEF COMPLAINT  Left-sided weakness    HPI  Israel Aviles is a 56 y.o. male who presents to the Emergency Department with chief complaint of left-sided weakness.  Patient reportedly woke up this morning and stated that he was having extremely hard time waking up.  He said that he told his wife he could not wake up at which point he noted that he was having some fairly profound left-sided weakness.  He reports that he was having numbness and tingling left upper extremity left lower extremity and was having a difficult time moving the left upper and left lower extremity.  Pain difficulty with movement in these extremities is slightly improved at this time but he still having difficulties with this.  He also reports that his wife noticed some minor left-sided facial droop.  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    REVIEW OF SYSTEMS  10 systems reviewed and otherwise negative, pertinent positives and negatives listed in the history of present illness.    PAST MEDICAL HISTORY   has a past medical history of CAD (coronary artery disease), Diabetes (HCC), Hyperlipidemia, and Hypertension.    SURGICAL HISTORY   has a past surgical history that includes irrigation & debridement ortho (Left, 6/24/2019); knee amputation below (Left, 12/20/2019); and zzz cardiac cath (12/16/2019).    SOCIAL HISTORY  Social History     Tobacco Use   • Smoking status: Never Smoker   •  Smokeless tobacco: Never Used   Vaping Use   • Vaping Use: Never used   Substance Use Topics   • Alcohol use: No   • Drug use: No      Social History     Substance and Sexual Activity   Drug Use No       FAMILY HISTORY  Non-Contributory    CURRENT MEDICATIONS  Home Medications     Reviewed by Dandy Beebe (Pharmacy Tech) on 02/17/22 at 1217  Med List Status: Complete   Medication Last Dose Status   aspirin 81 MG EC tablet 2/16/2022 Active   atorvastatin (LIPITOR) 80 MG tablet 2/17/2022 Active   clopidogrel (PLAVIX) 75 MG Tab 2/16/2022 Active   glipiZIDE (GLUCOTROL) 5 MG Tab 2/16/2022 Active   metFORMIN (GLUCOPHAGE) 500 MG Tab 2/16/2022 Active   metoprolol SR (TOPROL XL) 25 MG TABLET SR 24 HR 2/16/2022 Active   multivitamin (THERAGRAN) Tab 2/16/2022 Active                ALLERGIES  No Known Allergies    PHYSICAL EXAM  VITAL SIGNS: /81   Pulse 72   Temp 35.9 °C (96.7 °F) (Temporal)   Resp 14   Ht 1.829 m (6')   Wt 77.6 kg (171 lb 1.2 oz)   SpO2 99%   BMI 23.20 kg/m²   Pulse ox interpretation: I interpret this pulse ox as normal.  Constitutional: Alert and oriented x 3, minimal distress  HEENT: Atraumatic normocephalic, pupils are equal round, extraocular movements are intact. The nares is clear, external ears are normal, mouth shows moist mucous membranes  Neck: no obvious JVD or tracheal deviation  Cardiovascular: Regular rate and rhythm no murmur rub or gallop   Thorax & Lungs: No respiratory distress, no wheezes rales or rhonchi, No chest tenderness.   GI: Soft nontender nondistended positive bowel sounds, no peritoneal signs  Skin: Warm dry no obvious acute rash or lesion  Musculoskeletal: Moving all extremities with normal range strength, no acute  deformity  Neurologic: Minor left-sided facial droop sparing the forehead.  Patient has slight drift in the left upper and left lower extremity normal strength right upper and right lower.  Follows commands no inattention no gaze preference or visual  field deficit.  No cerebellar dysfunction.  NIH 3.  Psychiatric: Appropriate affect for situation at this time      DIAGNOSTIC STUDIES / PROCEDURES  LABS      Results for orders placed or performed during the hospital encounter of 02/17/22   CBC WITH DIFFERENTIAL   Result Value Ref Range    WBC 4.2 (L) 4.8 - 10.8 K/uL    RBC 3.70 (L) 4.70 - 6.10 M/uL    Hemoglobin 11.3 (L) 14.0 - 18.0 g/dL    Hematocrit 34.8 (L) 42.0 - 52.0 %    MCV 94.1 81.4 - 97.8 fL    MCH 30.5 27.0 - 33.0 pg    MCHC 32.5 (L) 33.7 - 35.3 g/dL    RDW 44.5 35.9 - 50.0 fL    Platelet Count 147 (L) 164 - 446 K/uL    MPV 10.1 9.0 - 12.9 fL    Neutrophils-Polys 44.70 44.00 - 72.00 %    Lymphocytes 43.80 (H) 22.00 - 41.00 %    Monocytes 9.20 0.00 - 13.40 %    Eosinophils 1.40 0.00 - 6.90 %    Basophils 0.70 0.00 - 1.80 %    Immature Granulocytes 0.20 0.00 - 0.90 %    Nucleated RBC 0.00 /100 WBC    Neutrophils (Absolute) 1.88 1.82 - 7.42 K/uL    Lymphs (Absolute) 1.85 1.00 - 4.80 K/uL    Monos (Absolute) 0.39 0.00 - 0.85 K/uL    Eos (Absolute) 0.06 0.00 - 0.51 K/uL    Baso (Absolute) 0.03 0.00 - 0.12 K/uL    Immature Granulocytes (abs) 0.01 0.00 - 0.11 K/uL    NRBC (Absolute) 0.00 K/uL   COMP METABOLIC PANEL   Result Value Ref Range    Sodium 143 135 - 145 mmol/L    Potassium 3.7 3.6 - 5.5 mmol/L    Chloride 110 96 - 112 mmol/L    Co2 23 20 - 33 mmol/L    Anion Gap 10.0 7.0 - 16.0    Glucose 151 (H) 65 - 99 mg/dL    Bun 15 8 - 22 mg/dL    Creatinine 0.59 0.50 - 1.40 mg/dL    Calcium 7.7 (L) 8.5 - 10.5 mg/dL    AST(SGOT) 19 12 - 45 U/L    ALT(SGPT) 13 2 - 50 U/L    Alkaline Phosphatase 59 30 - 99 U/L    Total Bilirubin 0.3 0.1 - 1.5 mg/dL    Albumin 3.4 3.2 - 4.9 g/dL    Total Protein 6.2 6.0 - 8.2 g/dL    Globulin 2.8 1.9 - 3.5 g/dL    A-G Ratio 1.2 g/dL   PROTHROMBIN TIME   Result Value Ref Range    PT 13.4 12.0 - 14.6 sec    INR 1.05 0.87 - 1.13   APTT   Result Value Ref Range    APTT 32.8 24.7 - 36.0 sec   COD (ADULT)   Result Value Ref Range    ABO  Grouping Only B     Rh Grouping Only POS     Antibody Screen-Cod NEG    TROPONIN   Result Value Ref Range    Troponin T 9 6 - 19 ng/L   ESTIMATED GFR   Result Value Ref Range    GFR If African American >60 >60 mL/min/1.73 m 2    GFR If Non African American >60 >60 mL/min/1.73 m 2   EKG   Result Value Ref Range    Report       [facility] Emergency Dept.    Test Date:  2022  Pt Name:    TRELL CASTLE              Department: ER  MRN:        [id number]                      Room:        19  Gender:     Male                         Technician: HRR  :        [id number]                   Requested By:ER TRIAGE PROTOCOL  Order #:    [id number]                    Reading MD: LATOYA BURRIS MD    Measurements  Intervals                                Axis  Rate:       73                           P:          51  PA:         148                          QRS:        10  QRSD:       84                           T:          77  QT:         396  QTc:        437    Interpretive Statements  SINUS RHYTHM  BORDERLINE LOW VOLTAGE IN FRONTAL LEADS  Compared to ECG 2021 12:56:43  No significant changes  Electronically Signed On 2022 11:08:30 PST by LATOYA BURRIS MD         All labs reviewed by me.      RADIOLOGY  DX-CHEST-PORTABLE (1 VIEW)   Final Result      No acute cardiopulmonary abnormality.      CT-CEREBRAL PERFUSION ANALYSIS   Final Result      1.  Cerebral blood flow less than 30% likely representing completed infarct = 0 mL.      2.  T Max more than 6 seconds likely representing combination of completed infarct and ischemia = 0 mL.      3.  Mismatched volume likely representing ischemic brain/penumbra = None      4.  Please note that the cerebral perfusion was performed on the limited brain tissue around the basal ganglia region. Infarct/ischemia outside the CT perfusion sections can be missed in this study.      CT-CTA NECK WITH & W/O-POST PROCESSING   Final Result       Atherosclerosis of the bilateral carotid bifurcations/bulbs, left greater than right, with less than 50% stenosis.      Patent vertebral arteries.      CT-CTA HEAD WITH & W/O-POST PROCESS   Final Result      No thrombosis is seen within the [place] of [place].      CT-HEAD W/O   Final Result      1.  No acute intracranial abnormality is identified.   2.  There are periventricular and subcortical white matter changes present.  This finding is nonspecific and could be from previous small vessel ischemia, demyelination, or gliosis.   3.  Focal hypodensities in the basal ganglia bilaterally, daysi and periventricular white matter on the left likely represent old lacunar infarcts.   4.  Paranasal sinus disease as above described.          COURSE & MEDICAL DECISION MAKING  Pertinent Labs & Imaging studies reviewed. (See chart for details)    9:52 AM - Patient seen and examined at bedside.     Coagulation studies were ordered in light of stroke alert and possible need for anticoagulation    Patient noted to have slightly elevated blood pressure likely circumstantial secondary to presenting complaint. Referred to primary care physician for further evaluation.        Medical Decision Making: This is a pleasant 56-year-old male who reports with some left-sided upper and lower extremity weakness as well as minor left-sided facial droop that he noticed upon awakening this morning at baseline going to bed last night.  Patient was evaluated at bedside with neurologist Dr. Woo.  Patient is candidate for thrombectomy at this time he is not a candidate for IV alteplase due to the unknown onset but it is within 24 hours.  He has an NIH 3 at this time.      Patient is taken to the CAT scanner for CT head CTA head and neck as well as perfusion studies which demonstrate no obvious ischemic area no obvious vessel occlusion.  I discussed with neuro hospitalist were both in agreement that patient requires admission for ongoing evaluation  treatment medical optimization.  He recommends MRI of the brain and the cervical spine.  I discussed this with the resident housestaff and is admitted for ongoing evaluation and treatment admitted in guarded condition..     /81   Pulse 72   Temp 35.9 °C (96.7 °F) (Temporal)   Resp 14   Ht 1.829 m (6')   Wt 77.6 kg (171 lb 1.2 oz)   SpO2 99%   BMI 23.20 kg/m²             FINAL IMPRESSION  1.  Left-sided weakness  2.  Left-sided facial droop  3.  Probable ischemic CVA      This dictation has been created using voice recognition software and/or scribes. The accuracy of the dictation is limited by the abilities of the software and the expertise of the scribes. I expect there may be some errors of grammar and possibly content. I made every attempt to manually correct the errors within my dictation. However, errors related to voice recognition software and/or scribes may still exist and should be interpreted within the appropriate context.

## 2022-02-17 NOTE — CARE PLAN
Problem: Knowledge Deficit - Standard  Goal: Patient and family/care givers will demonstrate understanding of plan of care, disease process/condition, diagnostic tests and medications  Outcome: Progressing   The patient is Stable - Low risk of patient condition declining or worsening    Progress made toward(s) clinical / shift goals:  Increased mobility to left side.  No complaints of pain.    Patient is not progressing towards the following goals: N/A

## 2022-02-17 NOTE — ED NOTES
Med rec completed per patient and spouse at bedside.  Allergies reviewed with patient. NKDA.  No outpatient antibiotics in the last 30 days.  Patient's pharmacy: [facility] on [address]  Patient is on ASPIRIN and PLAVIX.

## 2022-02-17 NOTE — CONSULTS
Neurology STROKE CODE H&P  Neurohospitalist Service, [facility]    Referring Physician: [place] M.D.    STROKE CODE:   Chief Complaint   Patient presents with   • Weakness     LUE and LLE weakness     • Possible Stroke       To obtain the most accurate data regarding the time called, and time patient seen, refer to the stroke run-sheet and chart.  For time of CT, refer to the radiology report. See A&P below for TPA Decision and door to needle time if and when applicable.    HPI: Israel Aviles is a 56 year old man with hypertension, hyperlipidemia, type 2 diabetes, CAD, history of L frontal stroke in 2018, went to bed normal at 200AM, woke up with L side deficits.  He reports weakness in his arm.  EMS activated, and on arrival to [facility], noted to have subtle L face droop, L arm drift.  NIHSS 2 as documented below.  He reports compliance with medications including ASA and plavix therapy.  A stroke protocol CT did not reveal acute hemorrhage, large territory stroke, critical stenoses, or large vessel occlusions.  There was no focal perfusion defect.  He does report a similar episode in 2021- also seen here at [facility] of LUE weakness, with eventual resolution.  An MRI was negative for acute infarct at that time.  On ROS he reports no infectious prodrome, no constitutional symptoms.  He does not use recreational stimulants, and does not smoke cigarettes.    Review of systems: In addition to what is detailed in the HPI above, all other systems reviewed and are negative.    Past Medical History:    has a past medical history of CAD (coronary artery disease), Diabetes (HCC), Hyperlipidemia, and Hypertension.    FHx:  family history includes Diabetes in his father and mother.    SHx:   reports that he has never smoked. He has never used smokeless tobacco. He reports that he does not drink alcohol and does not use drugs.    Allergies:  No Known Allergies    Medications:  No current  facility-administered medications for this encounter.    Current Outpatient Medications:   •  ezetimibe (ZETIA) 10 MG Tab, Take 1 Tablet by mouth every day., Disp: 90 Tablet, Rfl: 3  •  glipiZIDE (GLUCOTROL) 5 MG Tab, Take 1 Tablet by mouth 2 times a day., Disp: 60 Tablet, Rfl: 3  •  metoprolol SR (TOPROL XL) 25 MG TABLET SR 24 HR, Take 0.5 Tablets by mouth 2 times a day., Disp: 90 Tablet, Rfl: 3  •  aspirin 81 MG EC tablet, Take 1 tablet by mouth every day., Disp: 30 tablet, Rfl: 2  •  clopidogrel (PLAVIX) 75 MG Tab, Take 1 tablet by mouth every day., Disp: 30 tablet, Rfl: 0  •  metFORMIN (GLUCOPHAGE) 500 MG Tab, Take 500 mg by mouth 2 times a day., Disp: , Rfl:   •  atorvastatin (LIPITOR) 80 MG tablet, Take 1 Tab by mouth every day., Disp: 90 Tab, Rfl: 3    Physical Examination:    Vitals:    02/17/22 0955   Weight: 77.6 kg (171 lb 1.2 oz)   Height: 1.829 m (6')         General: Patient is awake and in no acute distress  Eye: Examination of optic disks not indicated at this time given acuity of consult  Neck: There is normal range of motion  CV: Regular rate   Extremities:  Clear, dry, intact, without peripheral edema- there is a left BKA    NEUROLOGICAL EXAM:     Mental status: Awake, alert and fully oriented  Speech and language: Speech is clear and fluent. The patient is able to name and repeat, and follow commands  Cranial nerve exam: Visual fields are full. There is no nystagmus. Extraocular muscles are intact. Subtle flattening of the L nasolabial fold.   Motor exam: There is sustained antigravity with no downward drift in R arm and bilateral legs. LUE with bobbing.  There is no pronator drift.  Tone is normal. No abnormal movements were seen on exam.  Sensory exam:  Reacts to tactile in all 4 distal extremities, there is no neglect to double stim.  Coordination: Ataxia on L FTN testing in accordance with weakness  Gait: Deferred due to patient preference.    NIHSS: National Institutes of Health Stroke  Scale    [0] 1a:Level of Consciousness    0-alert 1-drowsy   2-stupor   3-coma  [0] 1b:LOC Questions                  0-both  1-one      2-neither  [0] 1c:LOC Commands                   0-both  1-one      2-neither  [0] 2: Best Gaze                     0-nl    1-partial  2-forced  [0] 3: Visual Fields                   0-nl    1-partial  2-complete 3-bilat  [1] 4: Facial Paresis                0-nl    1-minor    2-partial  3-full  MOTOR                       0-nl  [0] 5: Right Arm           1-drift  [1] 6: Left Arm             2-some effort vs gravity  [0] 7: Right Leg           3-no effort vs gravity  [0] 8: Left Leg             4-no movement                             x-untestable  [0] 9: Limb Ataxia                    0-abs   1-1_limb   2-2+_limbs       x-untestable  [0] 10:Sensory                        0-nl    1-partial  2-dense  [0] 11:Best Language/Aphasia         0-nl    1-mild/mod 2-severe   3-mute  [0] 12:Dysarthria                     0-nl    1-mild/mod 2-severe       x-untestable  [0] 13:Neglect/Inattention            0-none  1-partial  2-complete  [2] TOTAL    Baseline Modified Duval Scale (MRS): 1 = No significant disability, despite symptoms; able to perform all usual duties and activities    Objective Data:    Labs:  Lab Results   Component Value Date/Time    PROTHROMBTM 13.1 07/05/2021 02:18 PM    INR 1.02 07/05/2021 02:18 PM      Lab Results   Component Value Date/Time    WBC 4.3 (L) 07/06/2021 03:40 AM    RBC 4.41 (L) 07/06/2021 03:40 AM    HEMOGLOBIN 13.6 (L) 07/06/2021 03:40 AM    HEMATOCRIT 40.2 (L) 07/06/2021 03:40 AM    MCV 91.2 07/06/2021 03:40 AM    MCH 30.8 07/06/2021 03:40 AM    MCHC 33.8 07/06/2021 03:40 AM    MPV 10.5 07/06/2021 03:40 AM    NEUTSPOLYS 43.70 (L) 07/06/2021 03:40 AM    LYMPHOCYTES 47.10 (H) 07/06/2021 03:40 AM    MONOCYTES 7.50 07/06/2021 03:40 AM    EOSINOPHILS 1.20 07/06/2021 03:40 AM    BASOPHILS 0.50 07/06/2021 03:40 AM    ANISOCYTOSIS 1+ 12/20/2019 03:00 AM       Lab Results   Component Value Date/Time    SODIUM 141 11/10/2021 06:52 AM    SODIUM 138 07/06/2021 03:40 AM    POTASSIUM 5.1 11/10/2021 06:52 AM    POTASSIUM 4.1 07/06/2021 03:40 AM    CHLORIDE 103 11/10/2021 06:52 AM    CHLORIDE 103 07/06/2021 03:40 AM    CO2 25 11/10/2021 06:52 AM    CO2 26 07/06/2021 03:40 AM    GLUCOSE 183 (H) 11/10/2021 06:52 AM    GLUCOSE 154 (H) 07/06/2021 03:40 AM    BUN 11 11/10/2021 06:52 AM    BUN 10 07/06/2021 03:40 AM    CREATININE 0.80 11/10/2021 06:52 AM    CREATININE 0.72 07/06/2021 03:40 AM    CREATININE 0.9 05/09/2007 01:20 AM    BUNCREATRAT 14 11/10/2021 06:52 AM      Lab Results   Component Value Date/Time    CHOLSTRLTOT 144 11/10/2021 06:52 AM    CHOLSTRLTOT 189 07/06/2021 03:40 AM     (H) 07/06/2021 03:40 AM    HDL 43 11/10/2021 06:52 AM    HDL 41 07/06/2021 03:40 AM    TRIGLYCERIDE 91 11/10/2021 06:52 AM    TRIGLYCERIDE 164 (H) 07/06/2021 03:40 AM       Lab Results   Component Value Date/Time    ALKPHOSPHAT 70 07/06/2021 03:40 AM    ASTSGOT 21 07/06/2021 03:40 AM    ALTSGPT 12 07/06/2021 03:40 AM    TBILIRUBIN 0.3 07/06/2021 03:40 AM        Imaging/Testing:    I interpreted and/or reviewed the patient's neuroimaging    DX-CHEST-PORTABLE (1 VIEW)   Final Result      No acute cardiopulmonary abnormality.      CT-CEREBRAL PERFUSION ANALYSIS   Final Result      1.  Cerebral blood flow less than 30% likely representing completed infarct = 0 mL.      2.  T Max more than 6 seconds likely representing combination of completed infarct and ischemia = 0 mL.      3.  Mismatched volume likely representing ischemic brain/penumbra = None      4.  Please note that the cerebral perfusion was performed on the limited brain tissue around the basal ganglia region. Infarct/ischemia outside the CT perfusion sections can be missed in this study.      CT-CTA NECK WITH & W/O-POST PROCESSING   Final Result      Atherosclerosis of the bilateral carotid bifurcations/bulbs, left greater than  right, with less than 50% stenosis.      Patent vertebral arteries.      CT-CTA HEAD WITH & W/O-POST PROCESS   Final Result      No thrombosis is seen within the [place] of Lim.      CT-HEAD W/O   Final Result      1.  No acute intracranial abnormality is identified.   2.  There are periventricular and subcortical white matter changes present.  This finding is nonspecific and could be from previous small vessel ischemia, demyelination, or gliosis.   3.  Focal hypodensities in the basal ganglia bilaterally, daysi and periventricular white matter on the left likely represent old lacunar infarcts.   4.  Paranasal sinus disease as above described.                Assessment and Plan:  Israel Aviles is a 56 year old man with multiple vascular risk factors, presenting with acute onset L side weakness.  He is outside standard therapeutic time window for IV-thrombolytics, and stroke protocol CT without evidence of a large evolving stroke, or large vessel occlusion amenable to endovascular clot retrieval.  He is already relatively well stroke optimized for his stroke prevention- on DAPT given his CAD/PAD history, high intensity statin, and DM and BP management.  Similar presentation in 2021 without new infarct on MRI.  Possibly stroke recrudescence, but recommend MRI brain and C-spine to rule out new ischemia or C-spine compressive pathology to explain clinical presentation.     Problem list:  1.  L side weakness  2.  Hypertension  3.  CAD  4.  PAD  5.  Hyperlipidemia  6.  Type 2 diabetes    Recommendations:   - q4h neurochecks ok   - no need for permissive HTN in the absence of an ischemic penumbra, may restart home anti-HTN, long-term BP goal is 110-130/60-80   - continue ASA 81mg daily, plavix 75mg daily   - stroke labs:  HgbA1c and lipid panel   - continue home atorvastatin for LDL goal < 70   - DM management for goal HgbA1c < 7   - PT/OT/SLP   - MRI brain without contrast, MRI C-spine without contrast   - ok to  defer TTE and long-term cardiac monitoring unless MRI reveals embolic appearing infarcts   - stroke bridge clinic in 4-6 weeks    Jude Woo MD  Neurohospitalist, [facility]

## 2022-02-18 ENCOUNTER — APPOINTMENT (OUTPATIENT)
Dept: CARDIOLOGY | Facility: MEDICAL CENTER | Age: 57
DRG: 065 | End: 2022-02-18
Attending: NURSE PRACTITIONER
Payer: COMMERCIAL

## 2022-02-18 PROBLEM — R29.90 STROKE-LIKE SYMPTOMS: Status: ACTIVE | Noted: 2022-02-18

## 2022-02-18 LAB
ANION GAP SERPL CALC-SCNC: 13 MMOL/L (ref 7–16)
BASOPHILS # BLD AUTO: 0.5 % (ref 0–1.8)
BASOPHILS # BLD: 0.02 K/UL (ref 0–0.12)
BUN SERPL-MCNC: 13 MG/DL (ref 8–22)
CALCIUM SERPL-MCNC: 8.9 MG/DL (ref 8.5–10.5)
CHLORIDE SERPL-SCNC: 104 MMOL/L (ref 96–112)
CO2 SERPL-SCNC: 21 MMOL/L (ref 20–33)
CREAT SERPL-MCNC: 0.64 MG/DL (ref 0.5–1.4)
EOSINOPHIL # BLD AUTO: 0.04 K/UL (ref 0–0.51)
EOSINOPHIL NFR BLD: 0.9 % (ref 0–6.9)
ERYTHROCYTE [DISTWIDTH] IN BLOOD BY AUTOMATED COUNT: 42.9 FL (ref 35.9–50)
GLUCOSE BLD-MCNC: 190 MG/DL (ref 65–99)
GLUCOSE BLD-MCNC: 208 MG/DL (ref 65–99)
GLUCOSE BLD-MCNC: 287 MG/DL (ref 65–99)
GLUCOSE BLD-MCNC: 316 MG/DL (ref 65–99)
GLUCOSE SERPL-MCNC: 195 MG/DL (ref 65–99)
HCT VFR BLD AUTO: 40.1 % (ref 42–52)
HGB BLD-MCNC: 13.6 G/DL (ref 14–18)
IMM GRANULOCYTES # BLD AUTO: 0.01 K/UL (ref 0–0.11)
IMM GRANULOCYTES NFR BLD AUTO: 0.2 % (ref 0–0.9)
LV EJECT FRACT  99904: 60
LV EJECT FRACT MOD 2C 99903: 66.06
LV EJECT FRACT MOD 4C 99902: 52.48
LV EJECT FRACT MOD BP 99901: 58.01
LYMPHOCYTES # BLD AUTO: 1.66 K/UL (ref 1–4.8)
LYMPHOCYTES NFR BLD: 38.5 % (ref 22–41)
MCH RBC QN AUTO: 30.9 PG (ref 27–33)
MCHC RBC AUTO-ENTMCNC: 33.9 G/DL (ref 33.7–35.3)
MCV RBC AUTO: 91.1 FL (ref 81.4–97.8)
MONOCYTES # BLD AUTO: 0.4 K/UL (ref 0–0.85)
MONOCYTES NFR BLD AUTO: 9.3 % (ref 0–13.4)
NEUTROPHILS # BLD AUTO: 2.18 K/UL (ref 1.82–7.42)
NEUTROPHILS NFR BLD: 50.6 % (ref 44–72)
NRBC # BLD AUTO: 0 K/UL
NRBC BLD-RTO: 0 /100 WBC
PLATELET # BLD AUTO: 176 K/UL (ref 164–446)
PMV BLD AUTO: 10.1 FL (ref 9–12.9)
POTASSIUM SERPL-SCNC: 3.8 MMOL/L (ref 3.6–5.5)
RBC # BLD AUTO: 4.4 M/UL (ref 4.7–6.1)
SODIUM SERPL-SCNC: 138 MMOL/L (ref 135–145)
WBC # BLD AUTO: 4.3 K/UL (ref 4.8–10.8)

## 2022-02-18 PROCEDURE — 770001 HCHG ROOM/CARE - MED/SURG/GYN PRIV*

## 2022-02-18 PROCEDURE — 85025 COMPLETE CBC W/AUTO DIFF WBC: CPT

## 2022-02-18 PROCEDURE — 80048 BASIC METABOLIC PNL TOTAL CA: CPT

## 2022-02-18 PROCEDURE — 97166 OT EVAL MOD COMPLEX 45 MIN: CPT

## 2022-02-18 PROCEDURE — 99233 SBSQ HOSP IP/OBS HIGH 50: CPT | Performed by: PSYCHIATRY & NEUROLOGY

## 2022-02-18 PROCEDURE — 99223 1ST HOSP IP/OBS HIGH 75: CPT | Performed by: PHYSICAL MEDICINE & REHABILITATION

## 2022-02-18 PROCEDURE — 99232 SBSQ HOSP IP/OBS MODERATE 35: CPT | Performed by: NURSE PRACTITIONER

## 2022-02-18 PROCEDURE — 93306 TTE W/DOPPLER COMPLETE: CPT | Mod: 26 | Performed by: INTERNAL MEDICINE

## 2022-02-18 PROCEDURE — 36415 COLL VENOUS BLD VENIPUNCTURE: CPT

## 2022-02-18 PROCEDURE — 97162 PT EVAL MOD COMPLEX 30 MIN: CPT

## 2022-02-18 PROCEDURE — 92610 EVALUATE SWALLOWING FUNCTION: CPT

## 2022-02-18 PROCEDURE — 97535 SELF CARE MNGMENT TRAINING: CPT

## 2022-02-18 PROCEDURE — 82962 GLUCOSE BLOOD TEST: CPT | Mod: 91

## 2022-02-18 PROCEDURE — 96372 THER/PROPH/DIAG INJ SC/IM: CPT

## 2022-02-18 PROCEDURE — 93306 TTE W/DOPPLER COMPLETE: CPT

## 2022-02-18 RX ORDER — GABAPENTIN 300 MG/1
300 CAPSULE ORAL 3 TIMES DAILY
Status: DISCONTINUED | OUTPATIENT
Start: 2022-02-18 | End: 2022-02-19

## 2022-02-18 RX ADMIN — INSULIN HUMAN 3 UNITS: 100 INJECTION, SOLUTION PARENTERAL at 13:32

## 2022-02-18 RX ADMIN — INSULIN HUMAN 5 UNITS: 100 INJECTION, SOLUTION PARENTERAL at 17:18

## 2022-02-18 RX ADMIN — INSULIN HUMAN 6 UNITS: 100 INJECTION, SOLUTION PARENTERAL at 21:05

## 2022-02-18 RX ADMIN — INSULIN HUMAN 2 UNITS: 100 INJECTION, SOLUTION PARENTERAL at 06:16

## 2022-02-18 ASSESSMENT — COGNITIVE AND FUNCTIONAL STATUS - GENERAL
SUGGESTED CMS G CODE MODIFIER DAILY ACTIVITY: CK
TURNING FROM BACK TO SIDE WHILE IN FLAT BAD: A LOT
WALKING IN HOSPITAL ROOM: TOTAL
HELP NEEDED FOR BATHING: A LITTLE
MOVING FROM LYING ON BACK TO SITTING ON SIDE OF FLAT BED: UNABLE
PERSONAL GROOMING: A LITTLE
DRESSING REGULAR UPPER BODY CLOTHING: A LOT
CLIMB 3 TO 5 STEPS WITH RAILING: TOTAL
MOBILITY SCORE: 8
DAILY ACTIVITIY SCORE: 16
SUGGESTED CMS G CODE MODIFIER MOBILITY: CM
DRESSING REGULAR LOWER BODY CLOTHING: A LOT
STANDING UP FROM CHAIR USING ARMS: A LOT
TOILETING: A LITTLE
EATING MEALS: A LITTLE
MOVING TO AND FROM BED TO CHAIR: UNABLE

## 2022-02-18 ASSESSMENT — GAIT ASSESSMENTS
ASSISTIVE DEVICE: FRONT WHEEL WALKER
GAIT LEVEL OF ASSIST: UNABLE TO PARTICIPATE

## 2022-02-18 ASSESSMENT — ACTIVITIES OF DAILY LIVING (ADL): TOILETING: INDEPENDENT

## 2022-02-18 ASSESSMENT — PAIN DESCRIPTION - PAIN TYPE
TYPE: ACUTE PAIN
TYPE: ACUTE PAIN

## 2022-02-18 NOTE — CONSULTS
Physical Medicine and Rehabilitation Consultation              Date of initial consultation: 2/18/2022  Consulting provider: JULIET Tolentino   Reason for consultation: assess for acute inpatient rehab appropriateness  LOS: 0 Day(s)    Chief complaint: Left-sided weakness    HPI: The patient is a 56 y.o. right hand dominant male with a past medical history of hypertension, hyperlipidemia type 2 diabetes, coronary artery disease, history of left frontal stroke in 2018, Left BKA in 12/2019;  who presented on 2/17/2022  9:37 AM with left-sided weakness, left facial droop.  Patient was evaluated by neurology on arrival to [facility], found to have an NIH score of 2 for left facial droop and left arm weakness.  Patient has been compliant with aspirin and Plavix.  Patient did not require TPA.  CT head showed no acute abnormalities, CT perfusion showed no mismatch.  MR brain found acute infarct in the right posterior limb of internal capsule adjacent to the basal ganglia.  MR C-spine was also pursued which found mild cervical degenerative changes.     Of note, patient was treated at [facility] rehab in late December 2019 and successfully discharged home with family support in early January 2020 for L BKA by Dr. Zhao.  Patient was treated by Dr. Alejandro Bai MD at that time.    The patient currently reports numbness and tingling in his left arm and leg. He also endorses dizziness and feeling light headed. He is joined in the room by his son and wife. Patients wife states that she works FT days but is willing to take time off to care for him. Son lives in [place] and is visiting.     ROS  Pertinent positives are mentioned in the HPI, all others reviewed and are negative.    Social Hx:  1 SH  1 ZACH  With: Spouse    THERAPY:  Restrictions: Fall risk   PT: Functional mobility   Pending     OT: ADLs  pending    SLP:   2/18: Regular diet thin liquids    IMAGING:  MR brain 2/17/2022     Acute infarct in the right  posterior limb internal capsule and adjacent basal ganglia.     Multiple remote lacunar infarcts involving the bilateral cerebral hemispheric deep white matter. These are more numerous than on the previous study from 2018.     Interval progression of chronic deep white matter microvascular ischemic changes.     Gradient echo hypointense lesions along the medial right temporal cortex involving the hippocampus and the medial right parietal region may represent focal microhemorrhages related to hypertension.    PROCEDURES:  None     PMH:  Past Medical History:   Diagnosis Date   • CAD (coronary artery disease)    • Diabetes (HCC)    • Hyperlipidemia    • Hypertension        PSH:  Past Surgical History:   Procedure Laterality Date   • KNEE AMPUTATION BELOW Left 12/20/2019    Procedure: AMPUTATION, BELOW KNEE;  Surgeon: Koby Zhao M.D.;  Location: SURGERY [facility];  Service: Orthopedics   • ZZZ CARDIAC CATH  12/16/2019    multi-vessel disease   • IRRIGATION & DEBRIDEMENT ORTHO Left 6/24/2019    Procedure: IRRIGATION AND DEBRIDEMENT, WOUND-FOOT, BIOLOGIC PLACEMENT, WOUND VAC PLACEMENT;  Surgeon: Charles Chopra M.D.;  Location: SURGERY [facility];  Service: Orthopedics       FHX:  Family History   Problem Relation Age of Onset   • Diabetes Mother    • Diabetes Father        Medications:  Current Facility-Administered Medications   Medication Dose   • senna-docusate (PERICOLACE or SENOKOT S) 8.6-50 MG per tablet 2 Tablet  2 Tablet    And   • polyethylene glycol/lytes (MIRALAX) PACKET 1 Packet  1 Packet    And   • magnesium hydroxide (MILK OF MAGNESIA) suspension 30 mL  30 mL    And   • bisacodyl (DULCOLAX) suppository 10 mg  10 mg   • enoxaparin (LOVENOX) inj 40 mg  40 mg   • labetalol (NORMODYNE/TRANDATE) injection 10 mg  10 mg   • ondansetron (ZOFRAN) syringe/vial injection 4 mg  4 mg   • ondansetron (ZOFRAN ODT) dispertab 4 mg  4 mg   • promethazine (PHENERGAN) tablet 12.5-25 mg  12.5-25 mg   •  promethazine (PHENERGAN) suppository 12.5-25 mg  12.5-25 mg   • prochlorperazine (COMPAZINE) injection 5-10 mg  5-10 mg   • insulin regular (HumuLIN R,NovoLIN R) injection  2-9 Units    And   • dextrose 50% (D50W) injection 50 mL  50 mL   • atorvastatin (LIPITOR) tablet 80 mg  80 mg   • clopidogrel (PLAVIX) tablet 75 mg  75 mg   • [Held by provider] metoprolol SR (TOPROL XL) tablet 12.5 mg  12.5 mg   • aspirin EC (ECOTRIN) tablet 81 mg  81 mg   • LORazepam (ATIVAN) tablet 1 mg  1 mg       Allergies:  No Known Allergies      Physical Exam:  Vitals: /84   Pulse 82   Temp 36.1 °C (97 °F) (Temporal)   Resp 18   Ht 1.829 m (6')   Wt 73.3 kg (161 lb 9.6 oz)   SpO2 97%   Gen: NAD  Head: NC/AT  Eyes/ Nose/ Mouth: PERRLA, moist mucous membranes  Cardio: RRR, good distal perfusion, warm extremities  Pulm: normal respiratory effort, no cyanosis   Abd: Soft NTND, negative borborygmi   Ext: No peripheral edema. No calf tenderness. No clubbing.    Mental status: answers questions appropriately follows commands  Speech: fluent, no aphasia or dysarthria    CRANIAL NERVES:  2,3: visual acuity grossly intact, PERRL  3,4,6: EOMI bilaterally, no nystagmus or diplopia  5: sensation intact to light touch bilaterally and symmetric  7: Very slight facial droop  8: hearing grossly intact  9,10: symmetric palate elevation  11: SCM/Trapezius strength 5/5 on right, 1/5 on left  12: tongue protrudes midline      Motor:      Upper Extremity  Myotome R L   Shoulder flexion C5 5 1/5   Elbow flexion C5 5 4/5   Wrist extension C6 5 3/5   Elbow extension C7 5 4/5   Finger flexion C8 5 4/5   Finger abduction T1 5 1/5     Lower Extremity Myotome R L   Hip flexion L2 5 4/5   Knee extension L3 5 5   Ankle dorsiflexion L4 5 -   Toe extension L5 5 -   Ankle plantarflexion S1 5 -       Sensory:   intact to light touch through out    DTRs:  Right  Left    Brachioradialis  2+  2+   Patella tendon  2+ 2+     Labs: Reviewed and significant for    Recent Labs     02/17/22  0946   RBC 3.70*   HEMOGLOBIN 11.3*   HEMATOCRIT 34.8*   PLATELETCT 147*   PROTHROMBTM 13.4   APTT 32.8   INR 1.05     Recent Labs     02/17/22  0946 02/18/22  0622   SODIUM 143 138   POTASSIUM 3.7 3.8   CHLORIDE 110 104   CO2 23 21   GLUCOSE 151* 195*   BUN 15 13   CREATININE 0.59 0.64   CALCIUM 7.7* 8.9     Recent Results (from the past 24 hour(s))   HEMOGLOBIN A1C    Collection Time: 02/17/22  1:25 PM   Result Value Ref Range    Glycohemoglobin 8.3 (H) 4.0 - 5.6 %    Est Avg Glucose 192 mg/dL   POCT glucose device results    Collection Time: 02/17/22  5:13 PM   Result Value Ref Range    Glucose - Accu-Ck 262 (H) 65 - 99 mg/dL   POCT glucose device results    Collection Time: 02/17/22  8:23 PM   Result Value Ref Range    Glucose - Accu-Ck 166 (H) 65 - 99 mg/dL   POCT glucose device results    Collection Time: 02/17/22  9:56 PM   Result Value Ref Range    Glucose - Accu-Ck 147 (H) 65 - 99 mg/dL   POCT glucose device results    Collection Time: 02/18/22  6:10 AM   Result Value Ref Range    Glucose - Accu-Ck 190 (H) 65 - 99 mg/dL   Basic Metabolic Panel    Collection Time: 02/18/22  6:22 AM   Result Value Ref Range    Sodium 138 135 - 145 mmol/L    Potassium 3.8 3.6 - 5.5 mmol/L    Chloride 104 96 - 112 mmol/L    Co2 21 20 - 33 mmol/L    Glucose 195 (H) 65 - 99 mg/dL    Bun 13 8 - 22 mg/dL    Creatinine 0.64 0.50 - 1.40 mg/dL    Calcium 8.9 8.5 - 10.5 mg/dL    Anion Gap 13.0 7.0 - 16.0   ESTIMATED GFR    Collection Time: 02/18/22  6:22 AM   Result Value Ref Range    GFR If African American >60 >60 mL/min/1.73 m 2    GFR If Non African American >60 >60 mL/min/1.73 m 2   POCT glucose device results    Collection Time: 02/18/22 11:57 AM   Result Value Ref Range    Glucose - Accu-Ck 208 (H) 65 - 99 mg/dL         ASSESSMENT:  Patient is a 56 y.o. male admitted with left sided weakness and sensory changes, found to have right PLIC acute infarct.      [facility] Code / Diagnosis to Support: 0001.1 -  Stroke: Left Body Involvement (Right Brain)    Rehabilitation: Impaired ADLs and mobility  Patient is ANTICIPATED TO BE a good candidate for inpatient rehab based on needs for PT, OT.  Patient will also benefit from family training.  Patient has a good discharge situation which will be home with spouse and son.     Barriers to transfer include: Insurance authorization, TCCs to verify disposition, medical clearance and bed availability     All cases are subject to administrative review and recommendations may change    Additional Recommendations:  - Awaiting therapy notes to confirm need. PAR to confirm insurance benefits. TCC to submit for insurance auth when therapy notes are in.   - Awaiting PT OT evaluations  - Echocardiogram with bubble study completed today  - Continue aspirin, statin, Plavix per neurology recommendations  - Tight control of diabetic blood glucose levels  - Tight control of blood pressure under 140/90  - Starting gabapentin 300 mg TID for neuropathic pain. High risk medication, Labs reviewed, CrCl 133.6, GFR >60  -PMR to follow in the periphery for rehab appropriateness, please reach out with questions or request for medical management      Medical Complexity:  HTN   Stroke   BKA   DM2    DVT PPX: SCDs      Thank you for allowing us to participate in the care of this patient.     Michael Kerns, DO   Physical Medicine and Rehabilitation     Please note that this dictation was created using voice recognition software. I have made every reasonable attempt to correct obvious errors, but there may be errors of grammar and possibly content that I did not discover before finalizing the note.

## 2022-02-18 NOTE — PROGRESS NOTES
Called to evaluate patient with increased left arm heaviness. At bedside patient with decreased strength in left arm as compared to H&P. Patient on medications as recommended by Neurology and going to MRI.

## 2022-02-18 NOTE — THERAPY
Physical Therapy   Initial Evaluation     Patient Name: Israel Aviles  Age:  56 y.o., Sex:  male  Medical Record #: [id number]  Today's Date: 2/18/2022     Precautions  Precautions: Fall Risk  Comments: L hemiparesis    Assessment  Mr. Medina is a 57 y/o male who presents to acute secondary to R internal capsule acute infarct. PMH of L BKA, unfortunately no prosthetic present. Pt with significant L sided weakness and gross motor coordination impairments that result in dynamic balance deficits that negatively impact his ability to perform gait, transfers, and bed mobility at his prior level of function and prevent his safe discharge home.. Pt able to stand with FWW and relative ease, however poor ability to pivot and unable to hop to ambulate. Asked son to bring patient's prosthetic to improve therapy. Recommend intensive post acute therapies prior to discharge home.    Plan    Recommend Physical Therapy 4 times per week until therapy goals are met for the following treatments:  Bed Mobility, Gait Training, Neuro Re-Education / Balance, Therapeutic Activities, and Therapeutic Exercises    DC Equipment Recommendations: Unable to determine at this time  Discharge Recommendations: Recommend post-acute placement for additional physical therapy services prior to discharge home            Objective       02/18/22 1132   Prior Living Situation   Prior Services None   Housing / Facility [address]   Steps Into Home 0   Equipment Owned None   Lives with - Patient's Self Care Capacity Spouse   Comments son assisted with history   Prior Level of Functional Mobility   Bed Mobility Independent   Transfer Status Independent   Ambulation Independent   Distance Ambulation (Feet)   (community ambulator)   Assistive Devices Used None   Comments has prosthetic for L BKA, not present   Cognition    Level of Consciousness Alert   Passive ROM Lower Body   Passive ROM Lower Body WDL   Active ROM Lower Body    Active ROM Lower Body   WDL   Strength Lower Body   Lower Body Strength  X   Comments R LE WFL. L hip flex/abd/add 3/5, knee flex/ext 5/5   Sensation Lower Body   Lower Extremity Sensation   WDL   Balance Assessment   Sitting Balance (Static) Fair   Sitting Balance (Dynamic) Fair -   Standing Balance (Static) Fair -   Standing Balance (Dynamic) Trace +   Weight Shift Sitting Fair   Weight Shift Standing Poor   Comments FWW   Gait Analysis   Gait Level Of Assist Unable to Participate   Assistive Device Front Wheel Walker   Weight Bearing Status no restrictions   Bed Mobility    Supine to Sit Moderate Assist   Sit to Supine Minimal Assist   Scooting Moderate Assist   Functional Mobility   Sit to Stand Minimal Assist   Bed, Chair, Wheelchair Transfer Moderate Assist   How much difficulty does the patient currently have...   Turning over in bed (including adjusting bedclothes, sheets and blankets)? 2   Sitting down on and standing up from a chair with arms (e.g., wheelchair, bedside commode, etc.) 1   Moving from lying on back to sitting on the side of the bed? 1   How much help from another person does the patient currently need...   Moving to and from a bed to a chair (including a wheelchair)? 2   Need to walk in a hospital room? 1   Climbing 3-5 steps with a railing? 1   6 clicks Mobility Score 8   Short Term Goals    Short Term Goal # 1 Pt will perform supine <> sit with SPV in 6 visits to get in/out of bed   Short Term Goal # 2 Pt will perform functional transfers with SPV in 6 visits to increase independence   Short Term Goal # 3 Pt will ambulate 50ft with FWW and min assist in 6 visits to increase independence

## 2022-02-18 NOTE — PROGRESS NOTES
4 Eyes Skin Assessment Completed by Rodri RN and Paris BRINK.    Head WDL  Ears WDL  Nose WDL  Mouth WDL  Neck WDL  Breast/Chest WDL  Shoulder Blades WDL  Spine WDL  (R) Arm/Elbow/Hand WDL  (L) Arm/Elbow/Hand WDL  Abdomen WDL  Groin WDL  Scrotum/Coccyx/Buttocks WDL  (R) Leg WDL  (L) Leg WDL  (R) Heel/Foot/Toe WDL  (L) Heel/Foot/Toe WDL          Devices In Places Pulse Ox      Interventions In Place N/A    Possible Skin Injury No    Pictures Uploaded Into Epic N/A  Wound Consult Placed N/A  RN Wound Prevention Protocol Ordered No

## 2022-02-18 NOTE — ASSESSMENT & PLAN NOTE
-Takes glipizide and metformin at home for his diabetes  -Most recent Hgb A1c in Nov 2021 was 8.4%  -Sliding scale insulin  -Resume metformin

## 2022-02-18 NOTE — DISCHARGE PLANNING
"[facility] Acute Rehabilitation Transitional Care Coordination    Physiatry consult complete.  Dr. Kerns recommends ANTICIPATED TO BE a good candidate for inpatient rehab based on needs for PT, OT.    IRF admission will require prior auth from [employer].  At present, patient's [employer] insurance showing \"Inactive, Pending Investigation.\"  Reached out to [facility] PAR team requesting follow up.  Per their team, patient is aware of status, planning to follow up with [employer] to resolve issue.      CDU GHADA Infante updated. TCC monitoring.    "

## 2022-02-18 NOTE — PROGRESS NOTES
Assessment completed. Pt A&Ox4. Respirations are even and unlabored on RA. Pt denies pain at this time. Patient continues to have L sided weakness, LUE drift. Numbness to L shoulder area. All other neuro intact. Monitors applied, VS stable, call light and belongings within reach. POC updated (MRI results, monitor, neuro checks). Pt educated on room and call light, pt verbalized understanding. Communication board updated. Needs met.

## 2022-02-18 NOTE — CARE PLAN
The patient is Watcher - Medium risk of patient condition declining or worsening    Shift Goals  Clinical Goals: EEG, MRI  Patient Goals: discharge  Family Goals: discharge    Progress made toward(s) clinical / shift goals:      Problem: Skin Integrity  Goal: Skin integrity is maintained or improved  Outcome: Progressing     Problem: Optimal Care of the Stroke Patient  Goal: Optimal emergency care for the stroke patient  Outcome: Progressing  Goal: Optimal acute care for the stroke patient  Outcome: Progressing     Problem: Knowledge Deficit - Stroke Education  Goal: Patient's knowledge of stroke and risk factors will improve  Outcome: Progressing     Problem: Psychosocial - Patient Condition  Goal: Patient's ability to verbalize feelings about condition will improve  Outcome: Progressing  Goal: Patient's ability to re-evaluate and adapt role responsibilities will improve  Outcome: Progressing     Problem: Neuro Status  Goal: Neuro status will remain stable or improve  Outcome: Progressing       Patient is not progressing towards the following goals:

## 2022-02-18 NOTE — PREADMISSION SCREENING NOTE
Pre-Admission Screening Form    Patient Information:   Name: Israel Aviles     MRN: [id number]       : 1965      Age: 56 y.o.   Gender: male      Race:  [1]       Marital Status:  [2]  Family Contact: Terrance RehmanCori        Relationship: Spouse [17]  Son [15]  Home Phone:   [phone]           Cell Phone: [phone]  Advanced Directives: None  Code Status:  FULL  Current Attending Provider: Mc PRATHER  Referring Physician: YAMILKA Sumner      Physiatrist Consult: Dr. Michael Kerns       Referral Date: 2022  Primary Payor Source:    Secondary Payor Source:      Medical Information:   Date of Admission to Acute Care [place]  Room Number: T212/00  Rehabilitation Diagnosis: 0001.1 - Stroke: Left Body Involvement (Right Brain)  Immunization History   Administered Date(s) Administered   • Hep A/HEP B Combined Vaccine (TwinRix) 2021   • Hepatitis B Vaccine (Adol/Adult) 2008   • Influenza (IM) Preservative Free - HISTORICAL DATA 10/15/2010   • Influenza Vaccine H1N1 - HISTORICAL DATA 2010   • MMR Vaccine 2008   • Moderna SARS-CoV-2 Vaccine 2022   • Pfizer SARS-CoV-2 Vaccine 12+ 2021, 2021   • TD Vaccine 2008   • Varicella Vaccine Live 2008     No Known Allergies  Past Medical History:   Diagnosis Date   • CAD (coronary artery disease)    • Diabetes (HCC)    • Hyperlipidemia    • Hypertension      Past Surgical History:   Procedure Laterality Date   • KNEE AMPUTATION BELOW Left 2019    Procedure: AMPUTATION, BELOW KNEE;  Surgeon: Koby Zhao M.D.;  Location: SURGERY [facility];  Service: Orthopedics   • ZZZ CARDIAC CATH  2019    multi-vessel disease   • IRRIGATION & DEBRIDEMENT ORTHO Left 2019    Procedure: IRRIGATION AND DEBRIDEMENT, WOUND-FOOT, BIOLOGIC PLACEMENT, WOUND VAC PLACEMENT;  Surgeon: Charles Chopra M.D.;  Location: [facility]  [facility];  Service: Orthopedics       History Leading to Admission, Conditions that Caused the Need for Rehab (CMS):    Jude Woo M.D.   Physician   Neurology   Consults      Signed   Date of Service:  2/17/2022  9:57 AM                     Neurology STROKE CODE H&P  Neurohospitalist Service, [facility]     Referring Physician: [place] M.D.     STROKE CODE:        Chief Complaint   Patient presents with   • Weakness       LUE and LLE weakness      • Possible Stroke         To obtain the most accurate data regarding the time called, and time patient seen, refer to the stroke run-sheet and chart.  For time of CT, refer to the radiology report. See A&P below for TPA Decision and door to needle time if and when applicable.     HPI: Israel Aviles is a 56 year old man with hypertension, hyperlipidemia, type 2 diabetes, CAD, history of L frontal stroke in 2018, went to bed normal at 200AM, woke up with L side deficits.  He reports weakness in his arm.  EMS activated, and on arrival to [facility], noted to have subtle L face droop, L arm drift.  NIHSS 2 as documented below.  He reports compliance with medications including ASA and plavix therapy.  A stroke protocol CT did not reveal acute hemorrhage, large territory stroke, critical stenoses, or large vessel occlusions.  There was no focal perfusion defect.  He does report a similar episode in 2021- also seen here at [facility] of LUE weakness, with eventual resolution.  An MRI was negative for acute infarct at that time.  On ROS he reports no infectious prodrome, no constitutional symptoms.  He does not use recreational stimulants, and does not smoke cigarettes.     Review of systems: In addition to what is detailed in the HPI above, all other systems reviewed and are negative.     Past Medical History:    has a past medical history of CAD (coronary artery disease), Diabetes (HCC), Hyperlipidemia, and Hypertension.     FHx:  family  history includes Diabetes in his father and mother.     SHx:   reports that he has never smoked. He has never used smokeless tobacco. He reports that he does not drink alcohol and does not use drugs.     Allergies:  No Known Allergies     Medications:  No current facility-administered medications for this encounter.     Current Outpatient Medications:   •  ezetimibe (ZETIA) 10 MG Tab, Take 1 Tablet by mouth every day., Disp: 90 Tablet, Rfl: 3  •  glipiZIDE (GLUCOTROL) 5 MG Tab, Take 1 Tablet by mouth 2 times a day., Disp: 60 Tablet, Rfl: 3  •  metoprolol SR (TOPROL XL) 25 MG TABLET SR 24 HR, Take 0.5 Tablets by mouth 2 times a day., Disp: 90 Tablet, Rfl: 3  •  aspirin 81 MG EC tablet, Take 1 tablet by mouth every day., Disp: 30 tablet, Rfl: 2  •  clopidogrel (PLAVIX) 75 MG Tab, Take 1 tablet by mouth every day., Disp: 30 tablet, Rfl: 0  •  metFORMIN (GLUCOPHAGE) 500 MG Tab, Take 500 mg by mouth 2 times a day., Disp: , Rfl:   •  atorvastatin (LIPITOR) 80 MG tablet, Take 1 Tab by mouth every day., Disp: 90 Tab, Rfl: 3     Physical Examination:     Vitals       Vitals:     02/17/22 0955   Weight: 77.6 kg (171 lb 1.2 oz)   Height: 1.829 m (6')               General: Patient is awake and in no acute distress  Eye: Examination of optic disks not indicated at this time given acuity of consult  Neck: There is normal range of motion  CV: Regular rate   Extremities:  Clear, dry, intact, without peripheral edema- there is a left BKA     NEUROLOGICAL EXAM:      Mental status: Awake, alert and fully oriented  Speech and language: Speech is clear and fluent. The patient is able to name and repeat, and follow commands  Cranial nerve exam: Visual fields are full. There is no nystagmus. Extraocular muscles are intact. Subtle flattening of the L nasolabial fold.   Motor exam: There is sustained antigravity with no downward drift in R arm and bilateral legs. LUE with bobbing.  There is no pronator drift.  Tone is normal. No abnormal  movements were seen on exam.  Sensory exam:  Reacts to tactile in all 4 distal extremities, there is no neglect to double stim.  Coordination: Ataxia on L FTN testing in accordance with weakness  Gait: Deferred due to patient preference.     NIHSS: National Institutes of Health Stroke Scale     [0] 1a:Level of Consciousness           0-alert 1-drowsy   2-stupor   3-coma  [0] 1b:LOC Questions                         0-both  1-one      2-neither  [0] 1c:LOC Commands                       0-both  1-one      2-neither  [0] 2: Best Gaze                      0-nl    1-partial  2-forced  [0] 3: Visual Fields                              0-nl    1-partial  2-complete 3-bilat  [1] 4: Facial Paresis                0-nl    1-minor    2-partial  3-full  MOTOR                                              0-nl  [0] 5: Right Arm                       1-drift  [1] 6: Left Arm                                     2-some effort vs gravity  [0] 7: Right Leg                       3-no effort vs gravity  [0] 8: Left Leg                                      4-no movement                                                              x-untestable  [0] 9: Limb Ataxia                    0-abs   1-1_limb   2-2+_limbs                                                              x-untestable  [0] 10:Sensory                        0-nl    1-partial  2-dense  [0] 11:Best Language/Aphasia         0-nl    1-mild/mod 2-severe   3-mute  [0] 12:Dysarthria                     0-nl    1-mild/mod 2-severe                                                              x-untestable  [0] 13:Neglect/Inattention                   0-none  1-partial  2-complete  [2] TOTAL     Baseline Modified Nova Scale (MRS): 1 = No significant disability, despite symptoms; able to perform all usual duties and activities         Assessment and Plan:  Israel Aviles is a 56 year old man with multiple vascular risk factors, presenting with acute onset L side weakness.  He  is outside standard therapeutic time window for IV-thrombolytics, and stroke protocol CT without evidence of a large evolving stroke, or large vessel occlusion amenable to endovascular clot retrieval.  He is already relatively well stroke optimized for his stroke prevention- on DAPT given his CAD/PAD history, high intensity statin, and DM and BP management.  Similar presentation in 2021 without new infarct on MRI.  Possibly stroke recrudescence, but recommend MRI brain and C-spine to rule out new ischemia or C-spine compressive pathology to explain clinical presentation.      Problem list:  1.  L side weakness  2.  Hypertension  3.  CAD  4.  PAD  5.  Hyperlipidemia  6.  Type 2 diabetes     Recommendations:              - q4h neurochecks ok              - no need for permissive HTN in the absence of an ischemic penumbra, may restart home anti-HTN, long-term BP goal is 110-130/60-80              - continue ASA 81mg daily, plavix 75mg daily              - stroke labs:  HgbA1c and lipid panel              - continue home atorvastatin for LDL goal < 70              - DM management for goal HgbA1c < 7              - PT/OT/SLP              - MRI brain without contrast, MRI C-spine without contrast              - ok to defer TTE and long-term cardiac monitoring unless MRI reveals embolic appearing infarcts              - stroke bridge clinic in 4-6 weeks     Jude Woo MD  Neurohospitalist, [facility] Acute Care Services     Jevon Brownlee M.D.   Resident   Family Medicine   H&P       Cosign Needed   Date of Service:  2/17/2022 12:24 PM                          PATIENT ID:    NAME:             Israel Aviles  MRN:               [id number]  YOB: 1965     Date of Admission: 2/17/2022      Attending: Bandar Alan MD   Senior Resident: Jevon Brownlee MD (PGY-3)  Parveen Resident: Gege Grey MD (PGY-1)     Primary Care Physician:  Donny Mcnally M.D.     HPI: Manan Aviles is a 56 y.o. male with a  history of diabetes, HTN, PAD, class I stage C heart failure, multivessel CAD and history of CVA in 2017 along with TIA in 2021 admitted work-up of strokelike symptoms.     History given by patient and his wife.  Last night at 2 AM when he went to bed reportedly everything was normal.  This morning when they woke up he had some difficulty getting out of his wheelchair to go to the bathroom.  He noticed that he was having weakness in his left leg and left arm.  Denies any difficulties finding words or any slurring of his speech, and his wife agrees with this.  They report that 6 months ago he had similar symptoms in his left side that were actually more severe, causing more weakness in his left arm.  He has been treated with aspirin and Plavix, as well as atorvastatin for his history of these things and he reports being compliant with these medications.     His vasculopathic history is quite significant.  He required a left below the knee amputation due to complications from his diabetes and PAD.  He has also been found to have multivessel CAD by the cardiologist.     ED Course: His vital signs were T-96.7, HR-76, RR-18, BP-155/88, SPO2 99% in RA.  Labs were significant for leukopenia 4.2, anemia of 11.3/34.8, and elevated blood glucose of 151.  Troponins were negative.  Head CT without contrast was unremarkable and there is no hemorrhage identified.  CTA of the head and neck was also done which showed some atherosclerosis of the carotids but less than 50% stenosis.  This also showed no thrombosis in the [place].  A cerebral perfusion analysis was performed which did show cerebral blood flow less than 30% likely representing completed infarct.  Neurology is consulted from the ER and came to evaluate the patient.  They recommended he be admitted and receive MRI of the brain and cervical spine, as well as be maximized on his medications.         CONSULTS:   Neurology     ASSESSMENT/PLAN:   Manan Aviles is a 56  y.o. male with a history of diabetes, HTN, PAD, class I stage C heart failure, multivessel CAD and history of CVA in 2017 along with TIA in 2021 admitted work-up of strokelike symptoms.     Neurological deficit present  -Patient coming in for left-sided arm and leg weakness  -Symptoms similar to an episode he had 6 months ago that was deemed to TIA  -Seems somewhat improved from the onset of symptoms  -Still having mildly diminished strength in his left upper extremity  -CT imaging of head and neck in ER largely unremarkable  Plan:  -Brain and cervical spine MRI per neurology recommendations.  We will also order hemoglobin A1c and lipid profile per neurology recommendations.  -Continue home aspirin 81 mg daily  -Continue home atorvastatin 80 mg daily  -Continue home clopidogrel 75 mg daily     Uncontrolled type 2 diabetes mellitus with hyperglycemia (HCC)  -Takes glipizide and metformin at home for his diabetes  -Most recent Hgb A1c in Nov 2021 was 8.4%  Plan:  -Sliding scale insulin  -Hold home metformin for now in setting of receiving many CT scans  -Hypoglycemic protocol        #Dispo  -Admitted under observation for work-up of arm weakness     #Core Measures   VTE Ppx: Lovenox  Abx: None  Diet: Consistent carb  Fluids: None  Lines and Tube: PUV  Code Status: Full           SREEDHAR Wright D.O.   Physician   Physical Medicine & Rehab   Consults      Signed   Date of Service:  2/18/2022 12:37 PM                                                                               Physical Medicine and Rehabilitation Consultation                                                                                  Date of initial consultation: 2/18/2022  Consulting provider: JULIET Tolentino   Reason for consultation: assess for acute inpatient rehab appropriateness  LOS: 0 Day(s)     Chief complaint: Left-sided weakness     HPI: The patient is a 56 y.o. right hand dominant male with a past medical  history of hypertension, hyperlipidemia type 2 diabetes, coronary artery disease, history of left frontal stroke in 2018, Left BKA in 12/2019;  who presented on 2/17/2022  9:37 AM with left-sided weakness, left facial droop.  Patient was evaluated by neurology on arrival to [facility], found to have an NIH score of 2 for left facial droop and left arm weakness.  Patient has been compliant with aspirin and Plavix.  Patient did not require TPA.  CT head showed no acute abnormalities, CT perfusion showed no mismatch.  MR brain found acute infarct in the right posterior limb of internal capsule adjacent to the basal ganglia.  MR C-spine was also pursued which found mild cervical degenerative changes.      Of note, patient was treated at [facility] rehab in late December 2019 and successfully discharged home with family support in early January 2020 for L BKA by Dr. Zhao.  Patient was treated by Dr. Alejandro Bai MD at that time.     The patient currently reports numbness and tingling in his left arm and leg. He also endorses dizziness and feeling light headed. He is joined in the room by his son and wife. Patients wife states that she works FT days but is willing to take time off to care for him. Son lives in [place] and is visiting.      ROS  Pertinent positives are mentioned in the HPI, all others reviewed and are negative.     Social Hx:  1 SH  1 ZACH  With: Spouse     THERAPY:  Restrictions: Fall risk   PT: Functional mobility   Pending      OT: ADLs  pending     SLP:   2/18: Regular diet thin liquids         ASSESSMENT:  Patient is a 56 y.o. male admitted with left sided weakness and sensory changes, found to have right PLIC acute infarct.       [facility] Code / Diagnosis to Support: 0001.1 - Stroke: Left Body Involvement (Right Brain)     Rehabilitation: Impaired ADLs and mobility  Patient is ANTICIPATED TO BE a good candidate for inpatient rehab based on needs for PT, OT.  Patient will also benefit from family training.   Patient has a good discharge situation which will be home with spouse and son.      Barriers to transfer include: Insurance authorization, TCCs to verify disposition, medical clearance and bed availability      All cases are subject to administrative review and recommendations may change     Additional Recommendations:  - Awaiting therapy notes to confirm need. PAR to confirm insurance benefits. TCC to submit for insurance auth when therapy notes are in.   - Awaiting PT OT evaluations  - Echocardiogram with bubble study completed today  - Continue aspirin, statin, Plavix per neurology recommendations  - Tight control of diabetic blood glucose levels  - Tight control of blood pressure under 140/90  - Starting gabapentin 300 mg TID for neuropathic pain. High risk medication, Labs reviewed, CrCl 133.6, GFR >60  -PMR to follow in the periphery for rehab appropriateness, please reach out with questions or request for medical management        Medical Complexity:  HTN   Stroke   BKA   DM2     DVT PPX: SCDs        Thank you for allowing us to participate in the care of this patient.      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) 11/23/2021   • Colon cancer screening declined 11/23/2021   • TIA (transient ischemic attack) 07/05/2021   • Type 2 diabetes mellitus with other specified complication (HCC) 06/18/2020   • Dyslipidemia due to type 2 diabetes mellitus (HCC)  06/18/2020   • HFrEF (heart failure with reduced ejection fraction) ([facility]) 05/13/2020   • Ischemic cardiomyopathy 05/13/2020   • Below-knee amputation ([facility]) 01/17/2020   • Adjustment disorder with depressed mood 01/17/2020   • Non-healing wound of right heel 01/07/2020   • Orthostatic hypotension 01/06/2020   • CAD (coronary artery disease) 12/28/2019   • Body mass index (BMI) 23.0-23.9, adult 09/13/2019   • Osteomyelitis of left foot ([facility]) 08/21/2019   • Diabetic foot ([facility]) 08/21/2019   • Pressure injury of deep tissue of left foot 07/30/2019   • Diabetic ulcer of toe of left foot associated with type 2 diabetes mellitus, with necrosis of muscle ([facility]) 07/30/2019   • Diabetic foot ulcer ([facility]) 07/07/2019   • Diabetic polyneuropathy associated with type 2 diabetes mellitus ([facility]) 07/07/2019   • PVD (peripheral vascular disease) ([facility]) 06/21/2019   • Wound infection 05/24/2019   • Uncontrolled type 2 diabetes mellitus with hyperglycemia ([facility]) 05/14/2019   • Hyperlipidemia 05/14/2019   • Acute ischemic stroke ([facility]) 06/15/2018     Addie Zhang R.N.   Registered Nurse      Progress Notes      Signed   Date of Service:  2/18/2022  4:31 AM                     Monitor summary:    w/ CARYLs   HR 80-85  .15/.05/.37          Current Vital Signs:   Temperature: 36.1 °C (97 °F) Pulse: 84 Respiration: 18 Blood Pressure: 142/84  Weight: 73.3 kg (161 lb 9.6 oz) Height: 182.9 cm (6')  Pulse Oximetry: 96 % O2 (LPM): 0      Completed Laboratory Reports:  Recent Labs     02/17/22  0946 02/17/22  1713 02/17/22 2023 02/17/22  2156 02/18/22  0610 02/18/22  0622 02/18/22  1157   WBC 4.2*  --   --   --   --   --   --    HEMOGLOBIN 11.3*  --   --   --   --   --   --    HEMATOCRIT 34.8*  --   --   --   --   --   --    PLATELETCT 147*  --   --   --   --   --   --    SODIUM 143  --   --   --   --  138  --    POTASSIUM 3.7  --   --   --   --  3.8  --    BUN 15  --   --   --   --  13  --    CREATININE 0.59  --   --   --   --  0.64  --     ALBUMIN 3.4  --   --   --   --   --   --    GLUCOSE 151*  --   --   --   --  195*  --    POCGLUCOSE  --  262* 166* 147* 190*  --  208*   INR 1.05  --   --   --   --   --   --      Additional Labs: Not Applicable    Prior Living Situation:   Housing / Facility: [address] [place]  Steps Into Home: 0  Lives with - Patient's Self Care Capacity: Spouse  Equipment Owned: None    Prior Level of Function / Living Situation:   Physical Therapy: Prior Services: Home-Independent  Housing / Facility: [address] [facility] [place]  Steps Into Home: 0  Bathroom Set up: Walk In Shower,Shower Chair  Equipment Owned: None  Lives with - Patient's Self Care Capacity: Spouse  Bed Mobility: Independent  Transfer Status: Independent  Ambulation: Independent  Distance Ambulation (Feet):  (community ambulator)  Assistive Devices Used: None  Current Level of Function:   Gait Level Of Assist: Unable to Participate  Assistive Device: Front Wheel Walker  Weight Bearing Status: no restrictions  Supine to Sit: Moderate Assist  Sit to Supine: Minimal Assist  Scooting: Moderate Assist  Rolling: Minimum Assist to Lt.  Sit to Stand: Minimal Assist  Bed, Chair, Wheelchair Transfer: Moderate Assist  Toilet Transfers: Moderate Assist  Transfer Method: Stand Pivot  Sitting in Chair: 10min  Sitting Edge of Bed: 10min  Standing: 3minx2  Occupational Therapy:   Self Feeding: Independent  Grooming / Hygiene: Independent  Bathing: Independent  Dressing: Independent  Toileting: Independent  Medication Management: Independent  Laundry: Independent  Kitchen Mobility: Independent  Finances: Independent  Home Management: Independent  Shopping: Independent  Prior Level Of Mobility: Independent Without Device in Community  Prior Services: Home-Independent  Housing / Facility: [address] [facility] [facility] [place]  Current Level of Function:   Upper Body Dressing: Moderate Assist  Lower Body Dressing: Maximal Assist  Toileting: Minimal Assist  Speech Language Pathology:      Rehabilitation  Prognosis/Potential: Good  Estimated Length of Stay: 14-21 days    Nursing:      Continent    Scope/Intensity of Services Recommended:  Physical Therapy: 1.5 hr / day  5 days / week. Therapeutic Interventions Required: Maximize Endurance, Mobility, Strength and Safety  Occupational Therapy: 1.5 hr / day 5 days / week. Therapeutic Interventions Required: Maximize Self Care, ADLs, IADLs and Energy Conservation  Speech & Language Pathology: SLP Mindi Heath [facility] 5 days / week. Therapeutic Interventions Required: Maximize Pending recommendations of SLP Cog Eval [facility]  Rehabilitation Nursin/7. Therapeutic Interventions Required: Monitor Pain, Skin, Vital Signs, Intake and Output, Labs, Safety, Aspiration Risk, Family Training and DVT Prophylaxis; Bowel and bladder regimen; ADL's; Infection control.   Rehabilitation Physician: 3 - 5 days / week. Therapeutic Interventions Required: Medical Management  Respiratory Care: Consult. Therapeutic Interventions Required: Pulmonary Toileting and Respiratory care per protocol  Dietician: Consult. Therapeutic Interventions Required: Nutritional evaluation with recommendations to promote optimal health and healing.     He requires 24-hour rehabilitation nursing to manage bowel and bladder function, skin care, nutrition and fluid intake, pulmonary hygiene, pain control, safety, medication management and patient/family goals. In addition, rehabilitation nursing will reiterate and reinforce therapy skills and equipment use, including ADLs, as well as provide education to the patient and family. Israel Aviles is willing to participate in and is able to tolerate the proposed plan of care.    Rehabilitation Goals and Plan (Expected frequency & duration of treatment in the IRF):   Return to the Community, Modified Independent Level of Care and Outpatient Support  Anticipated Date of Rehabilitation Admission: 2022  Patient/Family oriented IRF level of care/facility/plan:  Yes  Patient/Family willing to participate in IRF care/facility/plan: Yes  Patient able to tolerate IRF level of care proposed: Yes  Patient has potential to benefit IRF level of care proposed: Yes  Comments: Not Applicable    Special Needs or Precautions - Medical Necessity:  Safety Concerns/Precautions:  Fall Risk / High Risk for Falls, Balance and Bed / Chair Alarm  Pain Management  Weight-bearing Status: Non Weight Bearing, Left, Lower Extremity  Splints/Braces/Orthotics: LLE Prosthesis     Diet:   DIET ORDERS (From admission to next 24h)     Start     Ordered    02/17/22 1427  Diet Order Diet: Consistent CHO (Diabetic); Miscellaneous modifications: (optional): Vegetarian  ALL MEALS        Question Answer Comment   Diet: Consistent CHO (Diabetic)    Miscellaneous modifications: (optional) Vegetarian        02/17/22 1426                Anticipated Discharge Destination / Patient/Family Goal:  Destination: Home with Assistance Support System: Spouse and Family   Anticipated home health services: OT, PT, SLP and Nursing  Previously used HH service/ provider: Not Applicable  Anticipated DME Needs: To be determined  Outpatient Services: To be determined  Alternative resources to address additional identified needs:     Future Appointments   Date Time Provider Department Center   7/26/2022  9:40 AM Mahesh Segovia M.D. [facility] None       Pre-Screen Completed: 2/18/2022 2:35 PM Helen Decker R.N.

## 2022-02-18 NOTE — PROGRESS NOTES
Pt is complaining of more left sided heaviness of his upper and lower extremity.  Pt is unable to raise his left arm more than an inch off the bed now.  Dr. Alan was sent a message on [facility].  MRI was called to find out when the patient will be taken down for his MRI of the brain and spine.  MRI informed me that it may not be tonight since they are extremely busy.  I informed MRI of the Pt's worsening symptoms, and they said they would give the RN a call whenever they could get him in for MRI.  Patient was updated.

## 2022-02-18 NOTE — DISCHARGE PLANNING
Care Transition Team Assessment    Spoke with patient at bedside and verified all information. Lives with spouse in single story house. Has family support with spouse and son. Uses W/C and has Prosthetic leg. Uses [facility] on [address]. Family will be ride @ D/C unless D/C'd to acute rehab. Anticipate Rehab vs home with [facility].     Information Source  Orientation Level: Oriented X4  Information Given By: Patient    Readmission Evaluation  Is this a readmission?: No    Interdisciplinary Discharge Planning  Primary Care Physician: Donny Mcnally  Lives with - Patient's Self Care Capacity: Spouse  Patient or legal guardian wants to designate a caregiver: No  Support Systems: Children,Spouse / Significant Other  Housing / Facility: [address]  Do You Take your Prescribed Medications Regularly: Yes  Able to Return to Previous ADL's: Future Time w/Therapy  Mobility Issues: Yes  Prior Services: Home-Independent  Patient Prefers to be Discharged to:: Home  Assistance Needed: Unknown at this Time  Durable Medical Equipment: Other - Specify (W/C and leg Prosthesis)    Discharge Preparedness  What are your discharge supports?: Child,Spouse    Anticipated Discharge Information  Discharge Disposition: D/T to home under [facility]HA care in anticipation of covered skilled care (06)  Discharge Contact Phone Number: [phone]

## 2022-02-18 NOTE — PROGRESS NOTES
Notified Dr Alan about patient change in condition due to increase in Left arm heaviness. Was told By Dr Alan that the night resident Dr. Tej Velasquez would be covering this patient. Was also told that Dr Tej Velasquez was notified by Dr Alan.

## 2022-02-18 NOTE — ASSESSMENT & PLAN NOTE
-Neurology saw patient upon arrival to ED, recommended MRI brain and C-spine for evaluation of possible cord compression/for visualization/characterization of old infarct(s). Also recommends holding off with further cardiac monitoring.   -Continue with DAPT, Statin at current dosages for stroke optimization.

## 2022-02-18 NOTE — PROGRESS NOTES
[facility] Medicine Daily Progress Note    Date of Service  2/18/2022    Chief Complaint  Israel Aviles is a 56 y.o. male admitted 2/17/2022 with LEFT sided weakness.    Hospital Course  Mr. Manan Aviles is a 56-year-old male with a PMHx of HTN, HLD, DMT2, CAD, history of left frontal stroke in 2018, admitted on 2/17/2022 due to left sided deficits.    Patient reports waking up at 2 AM a noting weakness in his left arm.  EMS was activated and upon arrival at [facility], patient noted to have subtle left facial droop, left arm drift.  NIHSS score was at 2.  Patient reports he has been compliant with medication including ASA and Plavix therapy.  Stroke protocol was initiated and CT of the head did not reveal any acute hemorrhage, or large territory stroke, or critical stenosis, or large vessel occlusions.  CTA also noted no focal perfusion defect.  Patient reports that he has had a similar episode in 2021 where he was seen here at [facility] due to LUE weakness which eventually resolved.  MRI was obtained in 2021 which was negative for acute infarct at that time.  Upon examination, no infection prodrome, no constitutional symptoms, does not use recreational drugs, and denies any use of cigarettes.  Patient admitted into the observation unit for further evaluation.      Interval Problem Update  -Patient seen and examined.  Patient's son at bedside during evaluation.  Patient still noted to have severe LUE weakness in comparison to the right side.  Discussed with patient and family results from MRI brain which noted: Acute infarct in the right posterior limb internal capsule and adjacent basal ganglia, multiple remote lacunar infarcts involving bilateral cerebral hemispheric deep white matter, interval progression of chronic deep white matter microvascular ischemic changes, gradient echo hypointense lesion along with medial right temporal cortex involving the hippocampus and medial right parietal region representing focal  microhemorrhages related to hypertension.  Discussed with patient and family plan of care.  -Plan of care: Monitor patient, high risk for fall; echocardiogram with bubble study order; Neurology following - pending further recommendation.  -Disposition: Pending echocardiogram with bubble study; will need clearance from neurology  -Lab work: Reviewed; expected  -Imaging: Reviewed  -VSS at this time    I have personally seen and examined the patient at bedside. I discussed the plan of care with patient, family, bedside RN and .    Consultants/Specialty  neurology    Code Status  Full Code    Disposition  Patient is not medically cleared for discharge.   Anticipate discharge to to home with close outpatient follow-up.  I have placed the appropriate orders for post-discharge needs.    Review of Systems  ROS     Physical Exam  Temp:  [35.6 °C (96.1 °F)-36.4 °C (97.5 °F)] 36.1 °C (97 °F)  Pulse:  [62-87] 82  Resp:  [12-18] 18  BP: ()/(64-90) 137/84  SpO2:  [92 %-99 %] 97 %    Physical Exam    Fluids    Intake/Output Summary (Last 24 hours) at 2/18/2022 1058  Last data filed at 2/18/2022 0742  Gross per 24 hour   Intake --   Output 600 ml   Net -600 ml       Laboratory  Recent Labs     02/17/22  0946   WBC 4.2*   RBC 3.70*   HEMOGLOBIN 11.3*   HEMATOCRIT 34.8*   MCV 94.1   MCH 30.5   MCHC 32.5*   RDW 44.5   PLATELETCT 147*   MPV 10.1     Recent Labs     02/17/22  0946 02/18/22  0622   SODIUM 143 138   POTASSIUM 3.7 3.8   CHLORIDE 110 104   CO2 23 21   GLUCOSE 151* 195*   BUN 15 13   CREATININE 0.59 0.64   CALCIUM 7.7* 8.9     Recent Labs     02/17/22  0946   APTT 32.8   INR 1.05         Recent Labs     02/17/22  0946   TRIGLYCERIDE 143   HDL 43   LDL 65       Imaging  MR-CERVICAL SPINE-W/O   Final Result         Mild cervical spine degenerative changes without significant spinal canal stenosis or foraminal narrowing.      Remote right pontine lacunar infarct noted.      MR-BRAIN-W/O   Final Result          Acute infarct in the right posterior limb internal capsule and adjacent basal ganglia.      Multiple remote lacunar infarcts involving the bilateral cerebral hemispheric deep white matter. These are more numerous than on the previous study from 2018.      Interval progression of chronic deep white matter microvascular ischemic changes.      Gradient echo hypointense lesions along the medial right temporal cortex involving the hippocampus and the medial right parietal region may represent focal microhemorrhages related to hypertension.      DX-CHEST-PORTABLE (1 VIEW)   Final Result      No acute cardiopulmonary abnormality.      CT-CEREBRAL PERFUSION ANALYSIS   Final Result      1.  Cerebral blood flow less than 30% likely representing completed infarct = 0 mL.      2.  T Max more than 6 seconds likely representing combination of completed infarct and ischemia = 0 mL.      3.  Mismatched volume likely representing ischemic brain/penumbra = None      4.  Please note that the cerebral perfusion was performed on the limited brain tissue around the basal ganglia region. Infarct/ischemia outside the CT perfusion sections can be missed in this study.      CT-CTA NECK WITH & W/O-POST PROCESSING   Final Result      Atherosclerosis of the bilateral carotid bifurcations/bulbs, left greater than right, with less than 50% stenosis.      Patent vertebral arteries.      CT-CTA HEAD WITH & W/O-POST PROCESS   Final Result      No thrombosis is seen within the [place].      CT-HEAD W/O   Final Result      1.  No acute intracranial abnormality is identified.   2.  There are periventricular and subcortical white matter changes present.  This finding is nonspecific and could be from previous small vessel ischemia, demyelination, or gliosis.   3.  Focal hypodensities in the basal ganglia bilaterally, daysi and periventricular white matter on the left likely represent old lacunar infarcts.   4.  Paranasal sinus disease as above  described.            EC-ECHOCARDIOGRAM COMPLETE W/ CONT    (Results Pending)        Assessment/Plan  * Neurological deficit present- (present on admission)  Assessment & Plan  -Patient coming in for left-sided arm and leg weakness  -Symptoms similar to an episode he had 6 months ago that was deemed to TIA  -Seems somewhat improved from the onset of symptoms  -Still having mildly diminished strength in his left upper extremity  -CT imaging of head and neck in ER largely unremarkable  Plan:  -Brain and cervical spine MRI per neurology recommendations.  We will also order hemoglobin A1c and lipid profile per neurology recommendations.  -Continue home aspirin 81 mg daily  -Continue home atorvastatin 80 mg daily  -Continue home clopidogrel 75 mg daily    TIA (transient ischemic attack)- (present on admission)  Assessment & Plan  -Neurology saw patient upon arrival to ED, recommended MRI brain and C-spine for evaluation of possible cord compression/for visualization/characterization of old infarct(s). Also recommends holding off with further cardiac monitoring.   PLAN  -MRI brain and C-spine  -No need for permissive hypertension currently.   -Continue with DAPT, Statin at current dosages for stroke optimization.   -q4 hour neuro checks.     Type 2 diabetes mellitus with other specified complication (HCC)- (present on admission)  Assessment & Plan  -Takes glipizide and metformin at home for his diabetes  -Most recent Hgb A1c in Nov 2021 was 8.4%  Plan:  -Sliding scale insulin  -Hold home metformin for now in setting of receiving many CT scans  -Hypoglycemic protocol       VTE prophylaxis: therapeutic anticoagulation with Plavix    I have performed a physical exam and reviewed and updated ROS and Plan today (2/18/2022). In review of yesterday's note (2/17/2022), there are no changes except as documented  above.    ============================================================================================================  Please note that this dictation was created using voice recognition software. I have made every reasonable attempt to correct obvious errors, but there may be errors of grammar and possibly content that I did not discover before finalizing the note.    Electronically signed by:  RADHA Sumner, MSN, APRN, FNP-C  Hospitalist Services  [facility]  [phone]  [email or web address]  02/18/22         1123

## 2022-02-18 NOTE — ASSESSMENT & PLAN NOTE
-Takes glipizide and metformin at home for his diabetes  -Most recent Hgb A1c in Nov 2021 was 8.4%  Plan:  -Sliding scale insulin  -Hold home metformin for now in setting of receiving many CT scans  -Hypoglycemic protocol

## 2022-02-18 NOTE — THERAPY
"Occupational Therapy   Initial Evaluation     Patient Name: Israel Aviles  Age:  56 y.o., Sex:  male  Medical Record #: [id number]  Today's Date: 2/18/2022     Precautions: Fall Risk  Comments: L hemiparesis, prior L BKA (prosthetic not at bedside however family reports intention to bring it in)    Assessment  Patient is 56 y.o. male admitted for CVA work up d/t LUE weakness, MRI found R internal capsule acute infarct per Neurology. Pt demonstrates impaired LUE strength, motor planning and proprioception which impacts functional use of his L hand and decreases his independence in ADLs. Recommend intensive rehab to optimize functional independence.     Plan    Recommend Occupational Therapy 4 times per week until therapy goals are met for the following treatments:  Adaptive Equipment, Neuro Re-Education / Balance, Self Care/Activities of Daily Living, Therapeutic Activities, and Therapeutic Exercises.    DC Equipment Recommendations: Unable to determine at this time  Discharge Recommendations: Recommend post-acute placement for additional occupational therapy services prior to discharge home     Subjective    \"My arm doesn't work as well when it's cold.\"     Objective     02/18/22 1131   Initial Contact Note    Initial Contact Note Order Received and Verified, Occupational Therapy Evaluation in Progress with Full Report to Follow.   Prior Living Situation   Prior Services None   Housing / Facility 1 Story House   Steps Into Home 0   Bathroom Set up Walk In Shower;Shower Chair   Equipment Owned Tub / Shower Seat   Lives with - Patient's Self Care Capacity Spouse;Adult Children   Comments son present, pt lives with his wife and son   Prior Level of ADL Function   Self Feeding Independent   Grooming / Hygiene Independent   Bathing Independent   Dressing Independent   Toileting Independent   Prior Level of IADL Function   Medication Management Independent   Laundry Independent   Kitchen Mobility Independent "   Finances Independent   Home Management Independent   Shopping Independent   Prior Level Of Mobility Independent Without Device in Community   Precautions   Precautions Fall Risk   Comments L hemiparesis   Pain 0 - 10 Group   Therapist Pain Assessment Post Activity Pain Same as Prior to Activity;0;Nurse Notified   Cognition    Level of Consciousness Alert   Comments required cues/encouragment to put forth max effort during strength exam   Strength Upper Body   Upper Body Strength  X   Comments LUE grossly 2+/5   Upper Body Muscle Tone   Upper Body Muscle Tone  X   Lt Upper Extremity Muscle Tone Hypotonic   Neurological Concerns   Neurological Concerns Yes   Lt Upper Extremity Gross Motor Control Impaired   Lt Upper Extremity Fine Motor Control Impaired   Lt Upper Extremity Functional Use Impaired   Left In Hand Manipulation Impaired   Coordination Upper Body   Coordination X   Fine Motor Coordination impaired   Gross Motor Coordination impaired   Comments motor planning and proprioception of LUE and hand impaired, with cues pt able to exert increased  strength but unable to grade force for effective functional use   Balance Assessment   Sitting Balance (Static) Fair   Sitting Balance (Dynamic) Fair -   Standing Balance (Static) Fair -   Standing Balance (Dynamic) Trace +   Weight Shift Sitting Fair   Weight Shift Standing Poor   Comments standing with FWW and Camilla   Bed Mobility    Supine to Sit Moderate Assist   Sit to Supine Minimal Assist   Scooting Moderate Assist   Rolling Minimum Assist to Lt.   ADL Assessment   Grooming Minimal Assist;Seated   Upper Body Dressing Moderate Assist   Lower Body Dressing Maximal Assist   Toileting Minimal Assist   How much help from another person does the patient currently need...   Putting on and taking off regular lower body clothing? 2   Bathing (including washing, rinsing, and drying)? 3   Toileting, which includes using a toilet, bedpan, or urinal? 3   Putting on and  taking off regular upper body clothing? 2   Taking care of personal grooming such as brushing teeth? 3   Eating meals? 3   6 Clicks Daily Activity Score 16   Functional Mobility   Sit to Stand Minimal Assist   Bed, Chair, Wheelchair Transfer Moderate Assist   Toilet Transfers Moderate Assist   Transfer Method Stand Pivot   Mobility w/FWW, L prosthetic not present at time of eval   Activity Tolerance   Sitting in Chair 10min   Sitting Edge of Bed 10min   Standing 3minx2   Patient / Family Goals   Patient / Family Goal #1 regain independence   Short Term Goals   Short Term Goal # 1 pt will demo functional transfer with Camilla   Short Term Goal # 2 pt will complete UB dress with SPV   Education Group   Education Provided Upper Extremity Strengthening;Coordination;Upper Extremity Range of Motion;Activities of Daily Living;Role of Occupational Therapist   Role of Occupational Therapist Patient Response Patient;Family;Acceptance;Explanation;Verbal Demonstration   Upper Ext ROM Patient Response Patient;Family;Acceptance;Explanation;Verbal Demonstration;Action Demonstration   UE Strengthening Patient Response Patient;Family;Acceptance;Explanation;Verbal Demonstration;Action Demonstration   Coordination Patient Response Patient;Family;Acceptance;Explanation;Verbal Demonstration;Action Demonstration   ADL Patient Response Patient;Family;Acceptance;Explanation;Verbal Demonstration;Action Demonstration   Problem List   Problem List Decreased Active Daily Living Skills;Decreased Homemaking Skills;Decreased Functional Mobility;Decreased Activity Tolerance;Decreased Upper Extremity Strength Left;Decreased Upper Extremity AROM Left;Impaired Coordination Left Upper Extremity;Impaired Sensation Left Upper Extremity;Impaired Upper Extremity Tone Left   Anticipated Discharge Equipment and Recommendations   DC Equipment Recommendations Unable to determine at this time   Discharge Recommendations Recommend post-acute placement for  additional occupational therapy services prior to discharge home

## 2022-02-18 NOTE — ASSESSMENT & PLAN NOTE
-Patient coming in for left-sided arm and leg weakness  -Symptoms similar to an episode he had 6 months ago that was deemed to TIA  -Seems somewhat improved from the onset of symptoms  -Still having mildly diminished strength in his left upper extremity  -CT imaging of head and neck in ER largely unremarkable  Plan:  -Brain and cervical spine MRI per neurology recommendations.  We will also order hemoglobin A1c and lipid profile per neurology recommendations.  -Continue home aspirin 81 mg daily  -Continue home atorvastatin 80 mg daily  -Continue home clopidogrel 75 mg daily

## 2022-02-18 NOTE — HOSPITAL COURSE
Mr. Manan Aviles is a 56-year-old male with a PMHx of HTN, HLD, DMT2, CAD, history of left frontal stroke in 2018, admitted on 2/17/2022 due to left sided deficits.    Patient reports waking up at 2 AM a noting weakness in his left arm.  EMS was activated and upon arrival at [facility], patient noted to have subtle left facial droop, left arm drift.  NIHSS score was at 2.  Patient reports he has been compliant with medication including ASA and Plavix therapy.  Stroke protocol was initiated and CT of the head did not reveal any acute hemorrhage, or large territory stroke, or critical stenosis, or large vessel occlusions.  CTA also noted no focal perfusion defect.  Patient reports that he has had a similar episode in 2021 where he was seen here at [facility] due to LUE weakness which eventually resolved.  MRI was obtained in 2021 which was negative for acute infarct at that time.  Upon examination, no infection prodrome, no constitutional symptoms, does not use recreational drugs, and denies any use of cigarettes.  Patient admitted into the observation unit for further evaluation.

## 2022-02-18 NOTE — PROGRESS NOTES
Neurology Progress Note  Neurohospitalist Service, [facility]    Referring Physician: Mc Toribio*      Interval History:  L arm more weak, improved again this AM.  MRI revealing R internal capsule stroke.     Review of systems: In addition to what is detailed in the HPI and/or updated in the interval history, all other systems reviewed and are negative.    Past Medical History, Past Surgical History and Social History reviewed and unchanged from prior    Medications:    Current Facility-Administered Medications:   •  senna-docusate (PERICOLACE or SENOKOT S) 8.6-50 MG per tablet 2 Tablet, 2 Tablet, Oral, BID **AND** polyethylene glycol/lytes (MIRALAX) PACKET 1 Packet, 1 Packet, Oral, QDAY PRN **AND** magnesium hydroxide (MILK OF MAGNESIA) suspension 30 mL, 30 mL, Oral, QDAY PRN **AND** bisacodyl (DULCOLAX) suppository 10 mg, 10 mg, Rectal, QDAY PRN, Jevon Brownlee M.D.  •  enoxaparin (LOVENOX) inj 40 mg, 40 mg, Subcutaneous, DAILY, Jevon Brownlee M.D., 40 mg at 02/17/22 1432  •  labetalol (NORMODYNE/TRANDATE) injection 10 mg, 10 mg, Intravenous, Q4HRS PRN, Jevon Brownlee M.D.  •  ondansetron (ZOFRAN) syringe/vial injection 4 mg, 4 mg, Intravenous, Q4HRS PRN, Jevon Brownlee M.D.  •  ondansetron (ZOFRAN ODT) dispertab 4 mg, 4 mg, Oral, Q4HRS PRN, Jevon Brownlee M.D.  •  promethazine (PHENERGAN) tablet 12.5-25 mg, 12.5-25 mg, Oral, Q4HRS PRN, Jevon Brownlee M.D.  •  promethazine (PHENERGAN) suppository 12.5-25 mg, 12.5-25 mg, Rectal, Q4HRS PRN, Jevon Brownlee M.D.  •  prochlorperazine (COMPAZINE) injection 5-10 mg, 5-10 mg, Intravenous, Q4HRS PRN, Jevon Brownlee M.D.  •  insulin regular (HumuLIN R,NovoLIN R) injection, 2-9 Units, Subcutaneous, 4X/DAY ACHS, 3 Units at 02/18/22 1332 **AND** POC blood glucose manual result, , , Q AC AND BEDTIME(S) **AND** NOTIFY MD and PharmD, , , Once **AND** Administer 20 grams of glucose (approximately 8 ounces of fruit juice) every  15 minutes PRN FSBG less than 70 mg/dL, , , PRN **AND** dextrose 50% (D50W) injection 50 mL, 50 mL, Intravenous, Q15 MIN PRN, Jevon Brownlee M.D.  •  atorvastatin (LIPITOR) tablet 80 mg, 80 mg, Oral, DAILY, Jevon Brownlee M.D.  •  clopidogrel (PLAVIX) tablet 75 mg, 75 mg, Oral, DAILY, Jevon Brownlee M.D., 75 mg at 02/17/22 1728  •  [Held by provider] metoprolol SR (TOPROL XL) tablet 12.5 mg, 12.5 mg, Oral, BID, Jevon Brownlee M.D., 12.5 mg at 02/17/22 1728  •  aspirin EC (ECOTRIN) tablet 81 mg, 81 mg, Oral, DAILY, Jevon Brownlee M.D., 81 mg at 02/17/22 1727  •  LORazepam (ATIVAN) tablet 1 mg, 1 mg, Oral, Once, Kem Velasquez D.O.    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 The patient is able to name and repeat, and follow commands  Cranial nerve exam: Visual fields are full. There is no nystagmus. Extraocular muscles are intact. Subtle flattening of the L nasolabial fold.   Motor exam: There is sustained antigravity with no downward drift in R arm and bilateral legs. LUE with drift to bed.  There is no pronator drift.  Tone is normal. No abnormal movements were seen on exam.  Sensory exam:  Reacts to tactile in all 4 distal extremities, there is no neglect to double stim.  Coordination: Ataxia on L FTN testing in accordance with weakness  Gait: Deferred due to patient preference.     NIHSS: National Institutes of Health Stroke Scale     [0] 1a:Level of Consciousness            0-alert 1-drowsy   2-stupor   3-coma  [0] 1b:LOC Questions                         0-both  1-one      2-neither  [0] 1c:LOC Commands                       0-both  1-one      2-neither  [0] 2: Best Gaze                      0-nl    1-partial  2-forced  [0] 3: Visual Fields                              0-nl    1-partial  2-complete 3-bilat  [1] 4: Facial Paresis                0-nl    1-minor    2-partial  3-full  MOTOR                                              0-nl  [0] 5: Right Arm                       1-drift  [2] 6: Left Arm                                     2-some effort vs gravity  [0] 7: Right Leg                       3-no effort vs gravity  [0] 8: Left Leg                                      4-no movement                                                              x-untestable  [0] 9: Limb Ataxia                    0-abs   1-1_limb   2-2+_limbs                                                              x-untestable  [0] 10:Sensory                        0-nl    1-partial  2-dense  [0] 11:Best Language/Aphasia         0-nl    1-mild/mod 2-severe   3-mute  [0] 12:Dysarthria                     0-nl    1-mild/mod 2-severe                                                              x-untestable  [0] 13:Neglect/Inattention                   0-none  1-partial  2-complete  [3] TOTAL    Objective Data:    Labs:  Lab Results   Component Value Date/Time    PROTHROMBTM 13.4 02/17/2022 09:46 AM    INR 1.05 02/17/2022 09:46 AM      Lab Results   Component Value Date/Time    WBC 4.2 (L) 02/17/2022 09:46 AM    RBC 3.70 (L) 02/17/2022 09:46 AM    HEMOGLOBIN 11.3 (L) 02/17/2022 09:46 AM    HEMATOCRIT 34.8 (L) 02/17/2022 09:46 AM    MCV 94.1 02/17/2022 09:46 AM    MCH 30.5 02/17/2022 09:46 AM    MCHC 32.5 (L) 02/17/2022 09:46 AM    MPV 10.1 02/17/2022 09:46 AM    NEUTSPOLYS 44.70 02/17/2022 09:46 AM    LYMPHOCYTES 43.80 (H) 02/17/2022 09:46 AM    MONOCYTES 9.20 02/17/2022 09:46 AM    EOSINOPHILS 1.40 02/17/2022  09:46 AM    BASOPHILS 0.70 02/17/2022 09:46 AM    ANISOCYTOSIS 1+ 12/20/2019 03:00 AM      Lab Results   Component Value Date/Time    SODIUM 138 02/18/2022 06:22 AM    POTASSIUM 3.8 02/18/2022 06:22 AM    CHLORIDE 104 02/18/2022 06:22 AM    CO2 21 02/18/2022 06:22 AM    GLUCOSE 195 (H) 02/18/2022 06:22 AM    BUN 13 02/18/2022 06:22 AM    CREATININE 0.64 02/18/2022 06:22 AM    CREATININE 0.9 05/09/2007 01:20 AM    BUNCREATRAT 14 11/10/2021 06:52 AM      Lab Results   Component Value Date/Time    CHOLSTRLTOT 137 02/17/2022 09:46 AM    LDL 65 02/17/2022 09:46 AM    HDL 43 02/17/2022 09:46 AM    TRIGLYCERIDE 143 02/17/2022 09:46 AM       Lab Results   Component Value Date/Time    ALKPHOSPHAT 59 02/17/2022 09:46 AM    ASTSGOT 19 02/17/2022 09:46 AM    ALTSGPT 13 02/17/2022 09:46 AM    TBILIRUBIN 0.3 02/17/2022 09:46 AM        Imaging/Testing:    I interpreted and/or reviewed the patient's neuroimaging    MR-CERVICAL SPINE-W/O   Final Result         Mild cervical spine degenerative changes without significant spinal canal stenosis or foraminal narrowing.      Remote right pontine lacunar infarct noted.      MR-BRAIN-W/O   Final Result         Acute infarct in the right posterior limb internal capsule and adjacent basal ganglia.      Multiple remote lacunar infarcts involving the bilateral cerebral hemispheric deep white matter. These are more numerous than on the previous study from 2018.      Interval progression of chronic deep white matter microvascular ischemic changes.      Gradient echo hypointense lesions along the medial right temporal cortex involving the hippocampus and the medial right parietal region may represent focal microhemorrhages related to hypertension.      DX-CHEST-PORTABLE (1 VIEW)   Final Result      No acute cardiopulmonary abnormality.      CT-CEREBRAL PERFUSION ANALYSIS   Final Result      1.  Cerebral blood flow less than 30% likely representing completed infarct = 0 mL.      2.  T Max more  than 6 seconds likely representing combination of completed infarct and ischemia = 0 mL.      3.  Mismatched volume likely representing ischemic brain/penumbra = None      4.  Please note that the cerebral perfusion was performed on the limited brain tissue around the basal ganglia region. Infarct/ischemia outside the CT perfusion sections can be missed in this study.      CT-CTA NECK WITH & W/O-POST PROCESSING   Final Result      Atherosclerosis of the bilateral carotid bifurcations/bulbs, left greater than right, with less than 50% stenosis.      Patent vertebral arteries.      CT-CTA HEAD WITH & W/O-POST PROCESS   Final Result      No thrombosis is seen within the [place] of [place].      CT-HEAD W/O   Final Result      1.  No acute intracranial abnormality is identified.   2.  There are periventricular and subcortical white matter changes present.  This finding is nonspecific and could be from previous small vessel ischemia, demyelination, or gliosis.   3.  Focal hypodensities in the basal ganglia bilaterally, daysi and periventricular white matter on the left likely represent old lacunar infarcts.   4.  Paranasal sinus disease as above described.            EC-ECHOCARDIOGRAM COMPLETE W/ CONT    (Results Pending)       Assessment and Plan:  Israel Aviles is a 56 year-old man presenting with L side weakness, found to have a R internal capsule acute infarct.  Radiographic appearance suggestive of lacunar (small vessel) etiology.  Secondary prevention to include blood pressure, blood glucose control, antithrombotic and statin therapy.  He actually has risk factors well-controlled with exception of his diabetes.  Will check for plavix resistance.    Problem list:  1.  R internal capsule acute infarct  2.  History of stroke  3.  Hyperlipidemia  4.  Type 2 diabetes    Plan:   - q4h neurochecks   - long-term BP goal is 110-130/60-80; at goal without interventions at this time   - continue home atorvastatin 80mg  daily for goal LDL < 70   - continue ASA 81mg daily, plavix 75mg daily- on DAPT for CAD history   - check plavix resistance ([id number] genetic testing)   - HgbA1c is 8.3- DM management for goal HgbA1c < 7   - PT/OT/SLP- consider ARU   - of note- lacunar stroke often stutter and worsening L arm strength is indicative of stroke evolution and not new ischemia.  There is no treatment for this evolution.  Please activate stroke code only for new focal findings   - DVT chemoppx ok   - may defer TTE and long-term cardiac monitoring as not cardioembolic source   - stroke bridge clinic follow up   - please reconsult Neurology with any additional questions or concerns      The evaluation of the patient, and recommended management, was discussed with the hospitalist staff. I have performed a physical exam and reviewed and updated ROS and Plan today (2/18/2022).     Jude Woo MD  Neurohospitalist, Acute Care Services

## 2022-02-18 NOTE — DISCHARGE PLANNING
[facility] Transitional Care Coordination    Referral from:  Mc PRATHER  Insurance Provider on Facesheet:  No provider at this time  Potential Rehab Diagnosis:  Stroke    Chart review indicates patient has on going medical management and  therapy needs to possibly meet inpatient rehab facility criteria with the goal of returning to community.    D/C support:     Physiatry to consult per protocol.   MRI brain - Acute infarct in the right posterior limb internal capsule and adjacent basal ganglia.  Would welcome PT/OT as clinically appropriate.  Please have PFA follow up to screen for potential insurance provider for post acute services.      Last Covid test:       Thank you for the referral.

## 2022-02-18 NOTE — THERAPY
"Speech Language Pathology   Clinical Swallow Evaluation     Patient Name: Israel Aviles  AGE:  56 y.o., SEX:  male  Medical Record #: [id number]  Today's Date: 2/18/2022          Assessment    Per H&P: \"Patient is 56 y.o. male with a PMHx of HTN, HLD, DMT2, CAD, history of left frontal stroke in 2018, admitted on 2/17/2022 due to left sided deficits.\" MRI head results pending at time of evaluation. NO past ST notes located in EMR and family denies any hx of ST services that they know of.     Patient seen this AM upright in bed for Clinical Swallow Evaluation at bedside. NO current concerns reported from staff. CSE completed due to concern of new onset CVA. NO oral motor deficits observed. Patient observed eating breakfast including regular raw fruits (apple slices and grapes) with thin liquids (preferred tea). Patient presented with no obvious dysphagia. He demonstrated adequate mastication of solids, prompt swallows and suspected adequate hyolaryngeal elevation upon palpation. NO s/sx of aspiration noted during assessment.     Patient presents at baseline in terms of swallowing baseline regular vegetarian diet. Speech noted to be clear, 100% intelligible to unfamiliar listener for known and unknown context. NO indication of cognitive changes at this time. Family was encouraged to notify staff if changes occur or concerns arise.     Recommend patient continue current regular/thin diet given set up assist as needed for meals, in optimal upright positioning. Please notify SLP if concerns of dysphagia or cognitive deficit arise, then new orders may be appropriate.     Plan    Recommend Speech Therapy for Evaluation only. Ongoing ST intervention is not warranted at this time. Pt/family in agreement.      Subjective    Patient received awake/alert, upright in bed with spouse and family member present. RN cleared patient for swallow evaluation with no current concerns. Pt with ongoing LUE and LLE weakness.    "   Objective     02/18/22 0921   Prior Level Of Function   Communication Within Functional Limits   Swallow Within Functional Limits   Dentition Intact   Dentures None   Hearing Within Functional Limits for Evaluation   Oral Motor Eval    Is Patient Able to Complete Oral Motor Eval Yes, Within Normal Limits   Laryngeal Function   Voice Quality Within Functional Limits   Volutional Cough Within Functional Limits   Excursion Upon Swallow Complete   Oral Food Presentation   Single Swallow Thin (0) Within Functional Limits   Serial Swallow Thin (0) Within Functional Limits   Regular (7) Within Functional Limits   Regular-Easy to Chew (7) Within Functional Limits   Tracheostomy   Tracheostomy  No   Dysphagia Strategies / Recommendations   Strategies / Interventions Recommended (Yes / No) No   Dysphagia Rating   Nutritional Liquid Intake Rating Scale Non thickened beverages   Nutritional Food Intake Rating Scale Total oral diet with no restrictions   Patient / Family Goals   Patient / Family Goal #1 To use the bedside commode   Goal #1 Outcome   (notified RN and OT)   Education Group   Education Provided Dysphagia   Dysphagia Patient Response Patient;Family;Acceptance;Explanation;Verbal Demonstration   Additional Comments Notify RN if any concerns arise and SLP can return if needed

## 2022-02-18 NOTE — H&P
PATIENT ID:  NAME:  Israel Aviles  MRN:               [id number]  YOB: 1965    Date of Admission: 2/17/2022      Attending: Bandar Alan MD   Senior Resident: Jevon Brownlee MD (PGY-3)  Parveen Resident: Gege Grey MD (PGY-1)    Primary Care Physician:  Donny Mcnally M.D.    HPI: Manan Aviles is a 56 y.o. male with a history of diabetes, HTN, PAD, class I stage C heart failure, multivessel CAD and history of CVA in 2017 along with TIA in 2021 admitted work-up of strokelike symptoms.    History given by patient and his wife.  Last night at 2 AM when he went to bed reportedly everything was normal.  This morning when they woke up he had some difficulty getting out of his wheelchair to go to the bathroom.  He noticed that he was having weakness in his left leg and left arm.  Denies any difficulties finding words or any slurring of his speech, and his wife agrees with this.  They report that 6 months ago he had similar symptoms in his left side that were actually more severe, causing more weakness in his left arm.  He has been treated with aspirin and Plavix, as well as atorvastatin for his history of these things and he reports being compliant with these medications.    His vasculopathic history is quite significant.  He required a left below the knee amputation due to complications from his diabetes and PAD.  He has also been found to have multivessel CAD by the cardiologist.    ED Course: His vital signs were T-96.7, HR-76, RR-18, BP-155/88, SPO2 99% in RA.  Labs were significant for leukopenia 4.2, anemia of 11.3/34.8, and elevated blood glucose of 151.  Troponins were negative.  Head CT without contrast was unremarkable and there is no hemorrhage identified.  CTA of the head and neck was also done which showed some atherosclerosis of the carotids but less than 50% stenosis.  This also showed no thrombosis in the [place] of [place].  A cerebral perfusion analysis was performed which  did show cerebral blood flow less than 30% likely representing completed infarct.  Neurology is consulted from the ER and came to evaluate the patient.  They recommended he be admitted and receive MRI of the brain and cervical spine, as well as be maximized on his medications.    REVIEW OF SYSTEMS:   See HPI             PAST MEDICAL HISTORY:  Past Medical History:   Diagnosis Date   • CAD (coronary artery disease)    • Diabetes (HCC)    • Hyperlipidemia    • Hypertension        PAST SURGICAL HISTORY:  Past Surgical History:   Procedure Laterality Date   • KNEE AMPUTATION BELOW Left 12/20/2019    Procedure: AMPUTATION, BELOW KNEE;  Surgeon: Koyb Zhao M.D.;  Location: SURGERY [facility];  Service: Orthopedics   • ZZZ CARDIAC CATH  12/16/2019    multi-vessel disease   • IRRIGATION & DEBRIDEMENT ORTHO Left 6/24/2019    Procedure: IRRIGATION AND DEBRIDEMENT, WOUND-FOOT, BIOLOGIC PLACEMENT, WOUND VAC PLACEMENT;  Surgeon: Charles Chopra M.D.;  Location: SURGERY [facility];  Service: Orthopedics       FAMILY HISTORY:  Family History   Problem Relation Age of Onset   • Diabetes Mother    • Diabetes Father        SOCIAL HISTORY:   -  -Denies ever smoking  -Denies alcohol or drug use as well    ALLERGIES:  No Known Allergies    OUTPATIENT MEDICATIONS:  Current Outpatient Medications   Medication Instructions   • aspirin 81 mg, Oral, DAILY   • atorvastatin (LIPITOR) 80 mg, Oral, DAILY   • clopidogrel (PLAVIX) 75 mg, Oral, DAILY   • glipiZIDE (GLUCOTROL) 2.5 mg, Oral, 2 TIMES DAILY WITH MEALS, 1/2 tablet = 2.5 mg.   • metFORMIN (GLUCOPHAGE) 500 mg, Oral, 2 TIMES DAILY WITH MEALS   • metoprolol SR (TOPROL XL) 12.5 mg, Oral, 2 TIMES DAILY   • multivitamin (THERAGRAN) Tab 1 Tablet, Oral, DAILY       PHYSICAL EXAM:  Vitals:    02/17/22 1159 02/17/22 1423 02/17/22 1441 02/17/22 1450   BP: 143/81 155/90     Pulse: 72 75     Resp: 14 12     Temp:  36.1 °C (97 °F)     TempSrc:  Temporal     SpO2: 99% 98%      Weight:  73.3 kg (161 lb 9.6 oz) 73.3 kg (161 lb 9.6 oz) 73.3 kg (161 lb 9.6 oz)   Height:  1.829 m (6') 1.829 m (6')    , Temp (24hrs), Av.1 °C (96.9 °F), Min:35.9 °C (96.7 °F), Max:36.1 °C (97 °F)  , Pulse Oximetry: 98 %, O2 (LPM): 0, O2 Delivery Device: None - Room Air    Gen: NAD, laying in the emergency department yuan CASTILLOENT: Normocephalic; atraumatic, PERRL  Pulm: Clear to auscultation bilaterally, no respiratory distress   Cardio: RRR, normal S1&S2; no rubs, murmurs, or gallops   Abdom: Non-tender; non-distended; bowel sounds present  Ext: No edema; 2+ pulses   Neuro: CN II-XII grossly intact.  Mild weakness in his left upper extremity with handgrip but still 4+/5.  Right side 5 out of 5 strength.  No facial droop appreciated at the time of her exam.    LAB TESTS:   Recent Labs     22  0946   WBC 4.2*   RBC 3.70*   HEMOGLOBIN 11.3*   HEMATOCRIT 34.8*   MCV 94.1   MCH 30.5   RDW 44.5   PLATELETCT 147*   MPV 10.1   NEUTSPOLYS 44.70   LYMPHOCYTES 43.80*   MONOCYTES 9.20   EOSINOPHILS 1.40   BASOPHILS 0.70         Recent Labs     22  0946   SODIUM 143   POTASSIUM 3.7   CHLORIDE 110   CO2 23   BUN 15   CREATININE 0.59   CALCIUM 7.7*   ALBUMIN 3.4       CULTURES:   Results     ** No results found for the last 168 hours. **          IMAGES:  DX-CHEST-PORTABLE (1 VIEW)   Final Result      No acute cardiopulmonary abnormality.      CT-CEREBRAL PERFUSION ANALYSIS   Final Result      1.  Cerebral blood flow less than 30% likely representing completed infarct = 0 mL.      2.  T Max more than 6 seconds likely representing combination of completed infarct and ischemia = 0 mL.      3.  Mismatched volume likely representing ischemic brain/penumbra = None      4.  Please note that the cerebral perfusion was performed on the limited brain tissue around the basal ganglia region. Infarct/ischemia outside the CT perfusion sections can be missed in this study.      CT-CTA NECK WITH & W/O-POST PROCESSING    Final Result      Atherosclerosis of the bilateral carotid bifurcations/bulbs, left greater than right, with less than 50% stenosis.      Patent vertebral arteries.      CT-CTA HEAD WITH & W/O-POST PROCESS   Final Result      No thrombosis is seen within the [place] of [place].      CT-HEAD W/O   Final Result      1.  No acute intracranial abnormality is identified.   2.  There are periventricular and subcortical white matter changes present.  This finding is nonspecific and could be from previous small vessel ischemia, demyelination, or gliosis.   3.  Focal hypodensities in the basal ganglia bilaterally, daysi and periventricular white matter on the left likely represent old lacunar infarcts.   4.  Paranasal sinus disease as above described.            MR-BRAIN-W/O    (Results Pending)   MR-CERVICAL SPINE-W/O    (Results Pending)       CONSULTS:   Neurology    ASSESSMENT/PLAN:   Manan Aviles is a 56 y.o. male with a history of diabetes, HTN, PAD, class I stage C heart failure, multivessel CAD and history of CVA in 2017 along with TIA in 2021 admitted work-up of strokelike symptoms.    Neurological deficit present  -Patient coming in for left-sided arm and leg weakness  -Symptoms similar to an episode he had 6 months ago that was deemed to TIA  -Seems somewhat improved from the onset of symptoms  -Still having mildly diminished strength in his left upper extremity  -CT imaging of head and neck in ER largely unremarkable  Plan:  -Brain and cervical spine MRI per neurology recommendations.  We will also order hemoglobin A1c and lipid profile per neurology recommendations.  -Continue home aspirin 81 mg daily  -Continue home atorvastatin 80 mg daily  -Continue home clopidogrel 75 mg daily    Uncontrolled type 2 diabetes mellitus with hyperglycemia (HCC)  -Takes glipizide and metformin at home for his diabetes  -Most recent Hgb A1c in Nov 2021 was 8.4%  Plan:  -Sliding scale insulin  -Hold home metformin for now in setting  of receiving many CT scans  -Hypoglycemic protocol      #Dispo  -Admitted under observation for work-up of arm weakness    #Core Measures   VTE Ppx: Lovenox  Abx: None  Diet: Consistent carb  Fluids: None  Lines and Tube: PUV  Code Status: Full        Jevon Brownlee M.D.   PGY-3  [facility]   [phone]

## 2022-02-18 NOTE — PROGRESS NOTES
Called report to Michelle, receiving RN for room S178-1.   States she is in isolation room and will call this RN back as soon as she can.

## 2022-02-18 NOTE — CARE PLAN
The patient is Watcher - Medium risk of patient condition declining or worsening    Shift Goals  Clinical Goals: MRI results  Patient Goals: Glucose monitoring and increased mobility    Progress made toward(s) clinical / shift goals:    Plan of care reviewed with pt, all questions and concerns addressed. MRI completed, pending results. Pt denies pain, L sided weakness present, MD aware. Q4 neuro checks in place.   Problem: Knowledge Deficit - Standard  Goal: Patient and family/care givers will demonstrate understanding of plan of care, disease process/condition, diagnostic tests and medications  Outcome: Progressing     Problem: Skin Integrity  Goal: Skin integrity is maintained or improved  Outcome: Progressing

## 2022-02-18 NOTE — ASSESSMENT & PLAN NOTE
-Patient coming in for left-sided arm and leg weakness  -Symptoms similar to an episode he had 6 months ago that was deemed to TIA  -Still having mildly diminished strength in his left upper extremity  -CT imaging of head and neck in ER largely unremarkable  -Continue home aspirin 81 mg daily  -Continue home atorvastatin 80 mg daily  -Continue home clopidogrel 75 mg daily  Follow-up [id number] genetic testing

## 2022-02-19 LAB
ANION GAP SERPL CALC-SCNC: 12 MMOL/L (ref 7–16)
BUN SERPL-MCNC: 12 MG/DL (ref 8–22)
CALCIUM SERPL-MCNC: 9 MG/DL (ref 8.5–10.5)
CHLORIDE SERPL-SCNC: 104 MMOL/L (ref 96–112)
CO2 SERPL-SCNC: 21 MMOL/L (ref 20–33)
CREAT SERPL-MCNC: 0.69 MG/DL (ref 0.5–1.4)
ERYTHROCYTE [DISTWIDTH] IN BLOOD BY AUTOMATED COUNT: 43.2 FL (ref 35.9–50)
GLUCOSE BLD-MCNC: 165 MG/DL (ref 65–99)
GLUCOSE BLD-MCNC: 173 MG/DL (ref 65–99)
GLUCOSE BLD-MCNC: 201 MG/DL (ref 65–99)
GLUCOSE BLD-MCNC: 238 MG/DL (ref 65–99)
GLUCOSE BLD-MCNC: 332 MG/DL (ref 65–99)
GLUCOSE SERPL-MCNC: 163 MG/DL (ref 65–99)
HCT VFR BLD AUTO: 42.1 % (ref 42–52)
HGB BLD-MCNC: 14.5 G/DL (ref 14–18)
MCH RBC QN AUTO: 31.1 PG (ref 27–33)
MCHC RBC AUTO-ENTMCNC: 34.4 G/DL (ref 33.7–35.3)
MCV RBC AUTO: 90.3 FL (ref 81.4–97.8)
PLATELET # BLD AUTO: 182 K/UL (ref 164–446)
PMV BLD AUTO: 10 FL (ref 9–12.9)
POTASSIUM SERPL-SCNC: 4 MMOL/L (ref 3.6–5.5)
RBC # BLD AUTO: 4.66 M/UL (ref 4.7–6.1)
SODIUM SERPL-SCNC: 137 MMOL/L (ref 135–145)
WBC # BLD AUTO: 5.6 K/UL (ref 4.8–10.8)

## 2022-02-19 PROCEDURE — 770001 HCHG ROOM/CARE - MED/SURG/GYN PRIV*

## 2022-02-19 PROCEDURE — 700111 HCHG RX REV CODE 636 W/ 250 OVERRIDE (IP): Performed by: STUDENT IN AN ORGANIZED HEALTH CARE EDUCATION/TRAINING PROGRAM

## 2022-02-19 PROCEDURE — A9270 NON-COVERED ITEM OR SERVICE: HCPCS | Performed by: HOSPITALIST

## 2022-02-19 PROCEDURE — 36415 COLL VENOUS BLD VENIPUNCTURE: CPT

## 2022-02-19 PROCEDURE — A9270 NON-COVERED ITEM OR SERVICE: HCPCS | Performed by: PHYSICAL MEDICINE & REHABILITATION

## 2022-02-19 PROCEDURE — 85027 COMPLETE CBC AUTOMATED: CPT

## 2022-02-19 PROCEDURE — 82962 GLUCOSE BLOOD TEST: CPT

## 2022-02-19 PROCEDURE — A9270 NON-COVERED ITEM OR SERVICE: HCPCS | Performed by: STUDENT IN AN ORGANIZED HEALTH CARE EDUCATION/TRAINING PROGRAM

## 2022-02-19 PROCEDURE — 700102 HCHG RX REV CODE 250 W/ 637 OVERRIDE(OP): Performed by: PHYSICAL MEDICINE & REHABILITATION

## 2022-02-19 PROCEDURE — 99232 SBSQ HOSP IP/OBS MODERATE 35: CPT | Performed by: HOSPITALIST

## 2022-02-19 PROCEDURE — 80048 BASIC METABOLIC PNL TOTAL CA: CPT

## 2022-02-19 PROCEDURE — 700102 HCHG RX REV CODE 250 W/ 637 OVERRIDE(OP): Performed by: HOSPITALIST

## 2022-02-19 PROCEDURE — 700102 HCHG RX REV CODE 250 W/ 637 OVERRIDE(OP): Performed by: STUDENT IN AN ORGANIZED HEALTH CARE EDUCATION/TRAINING PROGRAM

## 2022-02-19 RX ORDER — GABAPENTIN 300 MG/1
300 CAPSULE ORAL EVERY EVENING
Status: DISCONTINUED | OUTPATIENT
Start: 2022-02-19 | End: 2022-02-24 | Stop reason: HOSPADM

## 2022-02-19 RX ADMIN — ATORVASTATIN CALCIUM 80 MG: 80 TABLET, FILM COATED ORAL at 04:28

## 2022-02-19 RX ADMIN — INSULIN HUMAN 2 UNITS: 100 INJECTION, SOLUTION PARENTERAL at 09:11

## 2022-02-19 RX ADMIN — CLOPIDOGREL BISULFATE 75 MG: 75 TABLET ORAL at 04:28

## 2022-02-19 RX ADMIN — GABAPENTIN 300 MG: 300 CAPSULE ORAL at 04:28

## 2022-02-19 RX ADMIN — ASPIRIN 81 MG: 81 TABLET, COATED ORAL at 04:28

## 2022-02-19 RX ADMIN — INSULIN HUMAN 3 UNITS: 100 INJECTION, SOLUTION PARENTERAL at 18:25

## 2022-02-19 RX ADMIN — GABAPENTIN 300 MG: 300 CAPSULE ORAL at 20:45

## 2022-02-19 RX ADMIN — INSULIN HUMAN 6 UNITS: 100 INJECTION, SOLUTION PARENTERAL at 20:29

## 2022-02-19 RX ADMIN — INSULIN HUMAN 3 UNITS: 100 INJECTION, SOLUTION PARENTERAL at 12:44

## 2022-02-19 RX ADMIN — ENOXAPARIN SODIUM 40 MG: 40 INJECTION SUBCUTANEOUS at 04:28

## 2022-02-19 ASSESSMENT — PAIN DESCRIPTION - PAIN TYPE
TYPE: ACUTE PAIN
TYPE: ACUTE PAIN

## 2022-02-19 ASSESSMENT — FIBROSIS 4 INDEX: FIB4 SCORE: 1.62

## 2022-02-19 ASSESSMENT — ENCOUNTER SYMPTOMS
CHILLS: 0
FEVER: 0

## 2022-02-19 NOTE — DISCHARGE PLANNING
Would appreciate updated [place] evals on Sunday for review by Physiatry/insurance on Monday.  Need to verify insurance benefits still.    139.9

## 2022-02-19 NOTE — CARE PLAN
The patient is Stable - Low risk of patient condition declining or worsening    Shift Goals  Clinical Goals: (P) Stable neuro checks  Patient Goals: (P) Get rest  Family Goals: (P) KEVIN    Progress made toward(s) clinical / shift goals:      Problem: Knowledge Deficit - Standard  Goal: Patient and family/care givers will demonstrate understanding of plan of care, disease process/condition, diagnostic tests and medications  Outcome: Progressing  Patient explains an understanding in the medications and plan of care.      Problem: Skin Integrity  Goal: Skin integrity is maintained or improved  Outcome: Progressing   Patient turns self in bed and extra pillows are used for support.     Problem: Neuro Status  Goal: Neuro status will remain stable or improve  Outcome: Progressing   Q4 neuro checks are being done and patient's neuro status remains stable.     Patient is not progressing towards the following goals:  N/A

## 2022-02-19 NOTE — CARE PLAN
The patient is Watcher - Medium risk of patient condition declining or worsening    Shift Goals  Clinical Goals: stable neuro  Patient Goals: sleep  Family Goals: discharge    Progress made toward(s) clinical / shift goals:  monitor neuro status,able to sleep  Problem: Knowledge Deficit - Standard  Goal: Patient and family/care givers will demonstrate understanding of plan of care, disease process/condition, diagnostic tests and medications  Outcome: Progressing     Problem: Optimal Care of the Stroke Patient  Goal: Optimal emergency care for the stroke patient  Outcome: Progressing     Problem: Neuro Status  Goal: Neuro status will remain stable or improve  Outcome: Progressing       Patient is not progressing towards the following goals:

## 2022-02-19 NOTE — PROGRESS NOTES
Assumed patient care at 0700. Patient is alert and oriented x 4. Patient has 2/5 strength on upper left extremity. 4/5 strength on left lower extremity. Patient denies and numbness or tingling of upper and lower extremities. Denies pain or discomfort. Bed in lowest position and locked. Call light within reach and bed alarm is set. Hourly rounding continues.

## 2022-02-19 NOTE — PROGRESS NOTES
Patient off the floor to S178-1 via hospital bed.  Belongings and chart with patient.  Medication tubed to floor.

## 2022-02-19 NOTE — PROGRESS NOTES
4 Eyes Skin Assessment Completed by KEENA Anderson and KEENA Nagy.    Head WDL  Ears WDL  Nose WDL  Mouth WDL  Neck WDL  Breast/Chest WDL  Shoulder Blades WDL  Spine WDL  (R) Arm/Elbow/Hand WDL  (L) Arm/Elbow/Hand WDL  Abdomen WDL  Groin WDL  Scrotum/Coccyx/Buttocks WDL  (R) Leg WDL  (L) Leg WDL Below the Knee amputation  (R) Heel/Foot/Toe WDL  (L) Heel/Foot/Toe No foot - BTK          Devices In Places None      Interventions In Place Heels Loaded W/Pillows and Pressure Redistribution Mattress    Possible Skin Injury No    Pictures Uploaded Into Epic N/A  Wound Consult Placed N/A  RN Wound Prevention Protocol Ordered No

## 2022-02-19 NOTE — PROGRESS NOTES
Hospital Medicine Daily Progress Note    Date of Service  2/19/2022    Chief Complaint  Israel Aviles is a 56 y.o. male admitted 2/17/2022 with LEFT sided weakness.    Hospital Course  Mr. Manan Aviles is a 56-year-old male with a PMHx of HTN, HLD, DMT2, CAD, history of left frontal stroke in 2018, admitted on 2/17/2022 due to left sided deficits.    Patient reports waking up at 2 AM a noting weakness in his left arm.  EMS was activated and upon arrival at [facility], patient noted to have subtle left facial droop, left arm drift.  NIHSS score was at 2.  Patient reports he has been compliant with medication including ASA and Plavix therapy.  Stroke protocol was initiated and CT of the head did not reveal any acute hemorrhage, or large territory stroke, or critical stenosis, or large vessel occlusions.  CTA also noted no focal perfusion defect.  Patient reports that he has had a similar episode in 2021 where he was seen here at [facility] due to LUE weakness which eventually resolved.  MRI was obtained in 2021 which was negative for acute infarct at that time.  Upon examination, no infection prodrome, no constitutional symptoms, does not use recreational drugs, and denies any use of cigarettes.  Patient admitted into the observation unit for further evaluation.      Interval Problem Update  Seen and examined. Chart reviewed, including labs and any pertinent imaging.  Weakness improving, PMR following  Change gabapentin to nightly, patient did not like how he felt on 3 times daily dosing    I have personally seen and examined the patient at bedside. I discussed the plan of care with patient, family, bedside RN and .    Consultants/Specialty  neurology    Code Status  Full Code    Disposition  Patient is not medically cleared for discharge.   Anticipate discharge to to home with close outpatient follow-up.  I have placed the appropriate orders for post-discharge needs.    Review of Systems  Review of Systems    Constitutional: Negative for chills and fever.   Cardiovascular: Negative for chest pain.      Physical Exam  Temp:  [36 °C (96.8 °F)-36.8 °C (98.2 °F)] 36.2 °C (97.1 °F)  Pulse:  [65-86] 83  Resp:  [16-18] 16  BP: (126-142)/(78-90) 126/86  SpO2:  [92 %-97 %] 92 %    Physical Exam  Vitals and nursing note reviewed.   Constitutional:       General: He is not in acute distress.     Appearance: He is not diaphoretic.   HENT:      Head: Normocephalic and atraumatic.      Nose: No congestion.      Mouth/Throat:      Mouth: Mucous membranes are moist.   Eyes:      General:         Right eye: No discharge.         Left eye: No discharge.      Extraocular Movements: Extraocular movements intact.      Conjunctiva/sclera: Conjunctivae normal.   Cardiovascular:      Rate and Rhythm: Normal rate.      Pulses: Normal pulses.   Pulmonary:      Effort: Pulmonary effort is normal. No respiratory distress.      Breath sounds: No wheezing.   Abdominal:      General: Abdomen is flat. There is no distension.      Palpations: Abdomen is soft.      Tenderness: There is no abdominal tenderness.   Musculoskeletal:         General: Normal range of motion.      Cervical back: Normal range of motion. No rigidity.   Skin:     General: Skin is warm and dry.      Coloration: Skin is not jaundiced.   Neurological:      Mental Status: He is alert.      Motor: Weakness present.         Fluids  No intake or output data in the 24 hours ending 02/19/22 1304    Laboratory  Recent Labs     02/17/22  0946 02/18/22  1947 02/19/22  0501   WBC 4.2* 4.3* 5.6   RBC 3.70* 4.40* 4.66*   HEMOGLOBIN 11.3* 13.6* 14.5   HEMATOCRIT 34.8* 40.1* 42.1   MCV 94.1 91.1 90.3   MCH 30.5 30.9 31.1   MCHC 32.5* 33.9 34.4   RDW 44.5 42.9 43.2   PLATELETCT 147* 176 182   MPV 10.1 10.1 10.0     Recent Labs     02/17/22  0946 02/18/22  0622 02/19/22  0501   SODIUM 143 138 137   POTASSIUM 3.7 3.8 4.0   CHLORIDE 110 104 104   CO2 23 21 21   GLUCOSE 151* 195* 163*   BUN 15 13 12    CREATININE 0.59 0.64 0.69   CALCIUM 7.7* 8.9 9.0     Recent Labs     02/17/22  0946   APTT 32.8   INR 1.05         Recent Labs     02/17/22  0946   TRIGLYCERIDE 143   HDL 43   LDL 65       Imaging  EC-ECHOCARDIOGRAM COMPLETE W/O CONT   Final Result      MR-CERVICAL SPINE-W/O   Final Result         Mild cervical spine degenerative changes without significant spinal canal stenosis or foraminal narrowing.      Remote right pontine lacunar infarct noted.      MR-BRAIN-W/O   Final Result         Acute infarct in the right posterior limb internal capsule and adjacent basal ganglia.      Multiple remote lacunar infarcts involving the bilateral cerebral hemispheric deep white matter. These are more numerous than on the previous study from 2018.      Interval progression of chronic deep white matter microvascular ischemic changes.      Gradient echo hypointense lesions along the medial right temporal cortex involving the hippocampus and the medial right parietal region may represent focal microhemorrhages related to hypertension.      DX-CHEST-PORTABLE (1 VIEW)   Final Result      No acute cardiopulmonary abnormality.      CT-CEREBRAL PERFUSION ANALYSIS   Final Result      1.  Cerebral blood flow less than 30% likely representing completed infarct = 0 mL.      2.  T Max more than 6 seconds likely representing combination of completed infarct and ischemia = 0 mL.      3.  Mismatched volume likely representing ischemic brain/penumbra = None      4.  Please note that the cerebral perfusion was performed on the limited brain tissue around the basal ganglia region. Infarct/ischemia outside the CT perfusion sections can be missed in this study.      CT-CTA NECK WITH & W/O-POST PROCESSING   Final Result      Atherosclerosis of the bilateral carotid bifurcations/bulbs, left greater than right, with less than 50% stenosis.      Patent vertebral arteries.      CT-CTA HEAD WITH & W/O-POST PROCESS   Final Result      No thrombosis is  seen within the [place].      CT-HEAD W/O   Final Result      1.  No acute intracranial abnormality is identified.   2.  There are periventricular and subcortical white matter changes present.  This finding is nonspecific and could be from previous small vessel ischemia, demyelination, or gliosis.   3.  Focal hypodensities in the basal ganglia bilaterally, daysi and periventricular white matter on the left likely represent old lacunar infarcts.   4.  Paranasal sinus disease as above described.                 Assessment/Plan  * Neurological deficit present- (present on admission)  Assessment & Plan  -Patient coming in for left-sided arm and leg weakness  -Symptoms similar to an episode he had 6 months ago that was deemed to TIA  -Seems somewhat improved from the onset of symptoms  -Still having mildly diminished strength in his left upper extremity  -CT imaging of head and neck in ER largely unremarkable  -Continue home aspirin 81 mg daily  -Continue home atorvastatin 80 mg daily  -Continue home clopidogrel 75 mg daily  -PMR following    Follow-up [id number] genetic testing    TIA (transient ischemic attack)- (present on admission)  Assessment & Plan  -Neurology saw patient upon arrival to ED, recommended MRI brain and C-spine for evaluation of possible cord compression/for visualization/characterization of old infarct(s). Also recommends holding off with further cardiac monitoring.   -Continue with DAPT, Statin at current dosages for stroke optimization.     Type 2 diabetes mellitus with other specified complication (HCC)- (present on admission)  Assessment & Plan  -Takes glipizide and metformin at home for his diabetes  -Most recent Hgb A1c in Nov 2021 was 8.4%  -Sliding scale insulin    Resume metformin when appropriate       VTE prophylaxis: therapeutic anticoagulation with Plavix    I have performed a physical exam and reviewed and updated ROS and Plan today (2/19/2022). In review of yesterday's note  (2/18/2022), there are no changes except as documented above.

## 2022-02-20 LAB
ANION GAP SERPL CALC-SCNC: 11 MMOL/L (ref 7–16)
BASOPHILS # BLD AUTO: 0.4 % (ref 0–1.8)
BASOPHILS # BLD: 0.02 K/UL (ref 0–0.12)
BUN SERPL-MCNC: 19 MG/DL (ref 8–22)
CALCIUM SERPL-MCNC: 9.3 MG/DL (ref 8.5–10.5)
CHLORIDE SERPL-SCNC: 104 MMOL/L (ref 96–112)
CO2 SERPL-SCNC: 24 MMOL/L (ref 20–33)
CREAT SERPL-MCNC: 0.78 MG/DL (ref 0.5–1.4)
EOSINOPHIL # BLD AUTO: 0.06 K/UL (ref 0–0.51)
EOSINOPHIL NFR BLD: 1.3 % (ref 0–6.9)
ERYTHROCYTE [DISTWIDTH] IN BLOOD BY AUTOMATED COUNT: 43.1 FL (ref 35.9–50)
GLUCOSE BLD-MCNC: 189 MG/DL (ref 65–99)
GLUCOSE BLD-MCNC: 202 MG/DL (ref 65–99)
GLUCOSE BLD-MCNC: 208 MG/DL (ref 65–99)
GLUCOSE BLD-MCNC: 264 MG/DL (ref 65–99)
GLUCOSE SERPL-MCNC: 186 MG/DL (ref 65–99)
HCT VFR BLD AUTO: 40.1 % (ref 42–52)
HGB BLD-MCNC: 13.4 G/DL (ref 14–18)
IMM GRANULOCYTES # BLD AUTO: 0.01 K/UL (ref 0–0.11)
IMM GRANULOCYTES NFR BLD AUTO: 0.2 % (ref 0–0.9)
LYMPHOCYTES # BLD AUTO: 1.91 K/UL (ref 1–4.8)
LYMPHOCYTES NFR BLD: 41.5 % (ref 22–41)
MCH RBC QN AUTO: 30.5 PG (ref 27–33)
MCHC RBC AUTO-ENTMCNC: 33.4 G/DL (ref 33.7–35.3)
MCV RBC AUTO: 91.1 FL (ref 81.4–97.8)
MONOCYTES # BLD AUTO: 0.42 K/UL (ref 0–0.85)
MONOCYTES NFR BLD AUTO: 9.1 % (ref 0–13.4)
NEUTROPHILS # BLD AUTO: 2.18 K/UL (ref 1.82–7.42)
NEUTROPHILS NFR BLD: 47.5 % (ref 44–72)
NRBC # BLD AUTO: 0 K/UL
NRBC BLD-RTO: 0 /100 WBC
PLATELET # BLD AUTO: 177 K/UL (ref 164–446)
PMV BLD AUTO: 10 FL (ref 9–12.9)
POTASSIUM SERPL-SCNC: 4 MMOL/L (ref 3.6–5.5)
RBC # BLD AUTO: 4.4 M/UL (ref 4.7–6.1)
SODIUM SERPL-SCNC: 139 MMOL/L (ref 135–145)
WBC # BLD AUTO: 4.6 K/UL (ref 4.8–10.8)

## 2022-02-20 PROCEDURE — 99232 SBSQ HOSP IP/OBS MODERATE 35: CPT | Performed by: HOSPITALIST

## 2022-02-20 PROCEDURE — A9270 NON-COVERED ITEM OR SERVICE: HCPCS | Performed by: STUDENT IN AN ORGANIZED HEALTH CARE EDUCATION/TRAINING PROGRAM

## 2022-02-20 PROCEDURE — 36415 COLL VENOUS BLD VENIPUNCTURE: CPT

## 2022-02-20 PROCEDURE — 700102 HCHG RX REV CODE 250 W/ 637 OVERRIDE(OP): Performed by: STUDENT IN AN ORGANIZED HEALTH CARE EDUCATION/TRAINING PROGRAM

## 2022-02-20 PROCEDURE — A9270 NON-COVERED ITEM OR SERVICE: HCPCS | Performed by: HOSPITALIST

## 2022-02-20 PROCEDURE — 700111 HCHG RX REV CODE 636 W/ 250 OVERRIDE (IP): Performed by: STUDENT IN AN ORGANIZED HEALTH CARE EDUCATION/TRAINING PROGRAM

## 2022-02-20 PROCEDURE — 80048 BASIC METABOLIC PNL TOTAL CA: CPT

## 2022-02-20 PROCEDURE — 700102 HCHG RX REV CODE 250 W/ 637 OVERRIDE(OP): Performed by: HOSPITALIST

## 2022-02-20 PROCEDURE — 82962 GLUCOSE BLOOD TEST: CPT | Mod: 91

## 2022-02-20 PROCEDURE — 85025 COMPLETE CBC W/AUTO DIFF WBC: CPT

## 2022-02-20 PROCEDURE — 770001 HCHG ROOM/CARE - MED/SURG/GYN PRIV*

## 2022-02-20 RX ADMIN — DOCUSATE SODIUM 50 MG AND SENNOSIDES 8.6 MG 2 TABLET: 8.6; 5 TABLET, FILM COATED ORAL at 18:48

## 2022-02-20 RX ADMIN — CLOPIDOGREL BISULFATE 75 MG: 75 TABLET ORAL at 04:56

## 2022-02-20 RX ADMIN — INSULIN HUMAN 2 UNITS: 100 INJECTION, SOLUTION PARENTERAL at 17:06

## 2022-02-20 RX ADMIN — GABAPENTIN 300 MG: 300 CAPSULE ORAL at 21:21

## 2022-02-20 RX ADMIN — ATORVASTATIN CALCIUM 80 MG: 80 TABLET, FILM COATED ORAL at 04:56

## 2022-02-20 RX ADMIN — ENOXAPARIN SODIUM 40 MG: 40 INJECTION SUBCUTANEOUS at 04:56

## 2022-02-20 RX ADMIN — METFORMIN HYDROCHLORIDE 500 MG: 500 TABLET ORAL at 09:05

## 2022-02-20 RX ADMIN — INSULIN HUMAN 5 UNITS: 100 INJECTION, SOLUTION PARENTERAL at 21:21

## 2022-02-20 RX ADMIN — INSULIN HUMAN 3 UNITS: 100 INJECTION, SOLUTION PARENTERAL at 09:05

## 2022-02-20 RX ADMIN — ASPIRIN 81 MG: 81 TABLET, COATED ORAL at 04:56

## 2022-02-20 RX ADMIN — METFORMIN HYDROCHLORIDE 500 MG: 500 TABLET ORAL at 17:06

## 2022-02-20 RX ADMIN — INSULIN HUMAN 3 UNITS: 100 INJECTION, SOLUTION PARENTERAL at 13:21

## 2022-02-20 ASSESSMENT — COGNITIVE AND FUNCTIONAL STATUS - GENERAL
STANDING UP FROM CHAIR USING ARMS: A LOT
HELP NEEDED FOR BATHING: A LOT
DRESSING REGULAR UPPER BODY CLOTHING: A LOT
TURNING FROM BACK TO SIDE WHILE IN FLAT BAD: A LITTLE
MOVING TO AND FROM BED TO CHAIR: A LOT
DRESSING REGULAR LOWER BODY CLOTHING: A LOT
TOILETING: A LOT
MOBILITY SCORE: 12
MOVING FROM LYING ON BACK TO SITTING ON SIDE OF FLAT BED: A LOT
WALKING IN HOSPITAL ROOM: A LOT
SUGGESTED CMS G CODE MODIFIER MOBILITY: CL
CLIMB 3 TO 5 STEPS WITH RAILING: TOTAL
EATING MEALS: A LITTLE
SUGGESTED CMS G CODE MODIFIER DAILY ACTIVITY: CK
DAILY ACTIVITIY SCORE: 14
PERSONAL GROOMING: A LITTLE

## 2022-02-20 ASSESSMENT — PAIN DESCRIPTION - PAIN TYPE: TYPE: ACUTE PAIN;CHRONIC PAIN

## 2022-02-20 ASSESSMENT — ENCOUNTER SYMPTOMS
WEAKNESS: 1
FEVER: 0
CHILLS: 0

## 2022-02-20 NOTE — CARE PLAN
The patient is Stable - Low risk of patient condition declining or worsening    Shift Goals  Clinical Goals: maintain/improve neuro status  Patient Goals: Get carlos sleep  Family Goals: monitor blood sugars    Progress made toward(s) clinical / shift goals:  Q4 neuro assessments in place. Hourly rounding, medicating per MAR      Problem: Knowledge Deficit - Standard  Goal: Patient and family/care givers will demonstrate understanding of plan of care, disease process/condition, diagnostic tests and medications  Outcome: Progressing      Problem: Knowledge Deficit - Stroke Education  Goal: Patient's knowledge of stroke and risk factors will improve  Outcome: Progressing     Patient is not progressing towards the following goals:

## 2022-02-20 NOTE — PROGRESS NOTES
Hospital Medicine Daily Progress Note    Date of Service  2/20/2022    Chief Complaint  Israel Aviles is a 56 y.o. male admitted 2/17/2022 with LEFT sided weakness.    Hospital Course  Mr. Manan Aviles is a 56-year-old male with a PMHx of HTN, HLD, DMT2, CAD, history of left frontal stroke in 2018, admitted on 2/17/2022 due to left sided deficits.    Patient reports waking up at 2 AM a noting weakness in his left arm.  EMS was activated and upon arrival at [facility], patient noted to have subtle left facial droop, left arm drift.  NIHSS score was at 2.  Patient reports he has been compliant with medication including ASA and Plavix therapy.  Stroke protocol was initiated and CT of the head did not reveal any acute hemorrhage, or large territory stroke, or critical stenosis, or large vessel occlusions.  CTA also noted no focal perfusion defect.  Patient reports that he has had a similar episode in 2021 where he was seen here at [facility] due to LUE weakness which eventually resolved.  MRI was obtained in 2021 which was negative for acute infarct at that time.  Upon examination, no infection prodrome, no constitutional symptoms, does not use recreational drugs, and denies any use of cigarettes.  Patient admitted into the observation unit for further evaluation.      Interval Problem Update  Seen and examined. Chart reviewed, including labs and any pertinent imaging.  Weakness improving, PMR following  Change gabapentin to nightly, patient did not like how he felt on 3 times daily dosing    I have personally seen and examined the patient at bedside. I discussed the plan of care with patient, family, bedside RN and .    Consultants/Specialty  neurology    Code Status  Full Code    Disposition  Patient is not medically cleared for discharge.   Anticipate discharge to to home with close outpatient follow-up.  I have placed the appropriate orders for post-discharge needs.    Review of Systems  Review of Systems    Constitutional: Negative for chills and fever.   Cardiovascular: Negative for chest pain.   Neurological: Positive for weakness.      Physical Exam  Temp:  [36 °C (96.8 °F)-36.1 °C (97 °F)] 36.1 °C (97 °F)  Pulse:  [77-91] 85  Resp:  [16-18] 16  BP: (108-129)/(64-89) 129/89  SpO2:  [96 %-100 %] 100 %    Physical Exam  Vitals and nursing note reviewed.   Constitutional:       General: He is not in acute distress.     Appearance: He is not diaphoretic.   HENT:      Head: Normocephalic and atraumatic.      Nose: No congestion.      Mouth/Throat:      Mouth: Mucous membranes are moist.   Eyes:      General:         Right eye: No discharge.         Left eye: No discharge.      Extraocular Movements: Extraocular movements intact.      Conjunctiva/sclera: Conjunctivae normal.   Cardiovascular:      Rate and Rhythm: Normal rate.      Pulses: Normal pulses.   Pulmonary:      Effort: Pulmonary effort is normal. No respiratory distress.      Breath sounds: No wheezing.   Abdominal:      General: Abdomen is flat. There is no distension.      Palpations: Abdomen is soft.      Tenderness: There is no abdominal tenderness.   Musculoskeletal:         General: Normal range of motion.      Cervical back: Normal range of motion. No rigidity.   Skin:     General: Skin is warm and dry.      Coloration: Skin is not jaundiced.   Neurological:      Mental Status: He is alert.      Motor: Weakness present.   Psychiatric:         Mood and Affect: Mood normal.         Thought Content: Thought content normal.         Fluids    Intake/Output Summary (Last 24 hours) at 2/20/2022 1315  Last data filed at 2/20/2022 0543  Gross per 24 hour   Intake --   Output 1350 ml   Net -1350 ml       Laboratory  Recent Labs     02/18/22  1947 02/19/22  0501 02/20/22  0224   WBC 4.3* 5.6 4.6*   RBC 4.40* 4.66* 4.40*   HEMOGLOBIN 13.6* 14.5 13.4*   HEMATOCRIT 40.1* 42.1 40.1*   MCV 91.1 90.3 91.1   MCH 30.9 31.1 30.5   MCHC 33.9 34.4 33.4*   RDW 42.9 43.2 43.1    PLATELETCT 176 182 177   MPV 10.1 10.0 10.0     Recent Labs     02/18/22  0622 02/19/22  0501 02/20/22  0224   SODIUM 138 137 139   POTASSIUM 3.8 4.0 4.0   CHLORIDE 104 104 104   CO2 21 21 24   GLUCOSE 195* 163* 186*   BUN 13 12 19   CREATININE 0.64 0.69 0.78   CALCIUM 8.9 9.0 9.3                   Imaging  EC-ECHOCARDIOGRAM COMPLETE W/O CONT   Final Result      MR-CERVICAL SPINE-W/O   Final Result         Mild cervical spine degenerative changes without significant spinal canal stenosis or foraminal narrowing.      Remote right pontine lacunar infarct noted.      MR-BRAIN-W/O   Final Result         Acute infarct in the right posterior limb internal capsule and adjacent basal ganglia.      Multiple remote lacunar infarcts involving the bilateral cerebral hemispheric deep white matter. These are more numerous than on the previous study from 2018.      Interval progression of chronic deep white matter microvascular ischemic changes.      Gradient echo hypointense lesions along the medial right temporal cortex involving the hippocampus and the medial right parietal region may represent focal microhemorrhages related to hypertension.      DX-CHEST-PORTABLE (1 VIEW)   Final Result      No acute cardiopulmonary abnormality.      CT-CEREBRAL PERFUSION ANALYSIS   Final Result      1.  Cerebral blood flow less than 30% likely representing completed infarct = 0 mL.      2.  T Max more than 6 seconds likely representing combination of completed infarct and ischemia = 0 mL.      3.  Mismatched volume likely representing ischemic brain/penumbra = None      4.  Please note that the cerebral perfusion was performed on the limited brain tissue around the basal ganglia region. Infarct/ischemia outside the CT perfusion sections can be missed in this study.      CT-CTA NECK WITH & W/O-POST PROCESSING   Final Result      Atherosclerosis of the bilateral carotid bifurcations/bulbs, left greater than right, with less than 50% stenosis.       Patent vertebral arteries.      CT-CTA HEAD WITH & W/O-POST PROCESS   Final Result      No thrombosis is seen within the [place] of [place].      CT-HEAD W/O   Final Result      1.  No acute intracranial abnormality is identified.   2.  There are periventricular and subcortical white matter changes present.  This finding is nonspecific and could be from previous small vessel ischemia, demyelination, or gliosis.   3.  Focal hypodensities in the basal ganglia bilaterally, daysi and periventricular white matter on the left likely represent old lacunar infarcts.   4.  Paranasal sinus disease as above described.                 Assessment/Plan  * Neurological deficit present- (present on admission)  Assessment & Plan  -Patient coming in for left-sided arm and leg weakness  -Symptoms similar to an episode he had 6 months ago that was deemed to TIA  -Still having mildly diminished strength in his left upper extremity  -CT imaging of head and neck in ER largely unremarkable  -Continue home aspirin 81 mg daily  -Continue home atorvastatin 80 mg daily  -Continue home clopidogrel 75 mg daily  Follow-up [id number] genetic testing    TIA (transient ischemic attack)- (present on admission)  Assessment & Plan  -Neurology saw patient upon arrival to ED, recommended MRI brain and C-spine for evaluation of possible cord compression/for visualization/characterization of old infarct(s). Also recommends holding off with further cardiac monitoring.   -Continue with DAPT, Statin at current dosages for stroke optimization.     Type 2 diabetes mellitus with other specified complication (HCC)- (present on admission)  Assessment & Plan  -Takes glipizide and metformin at home for his diabetes  -Most recent Hgb A1c in Nov 2021 was 8.4%  -Sliding scale insulin  -Resume metformin       VTE prophylaxis: therapeutic anticoagulation with Plavix    I have performed a physical exam and reviewed and updated ROS and Plan today (2/20/2022). In review of  yesterday's note (2/19/2022), there are no changes except as documented above.

## 2022-02-20 NOTE — CARE PLAN
The patient is Stable - Low risk of patient condition declining or worsening    Shift Goals  Clinical Goals: BG control, rest  Patient Goals: Get carlos sleep  Family Goals: monitor blood sugars    Progress made toward(s) clinical / shift goals:  Pt AAOx4, very pleasant, no complaints of pain. Pt son curious as to why evening insulin was so high. Educated on timing of insulin checks with late dinner. Pt mobilized to EOB to use urinal. Pt denies any pain or new needs. Q4 neuro checks. Call light/belongings in reach, hourly rounding in place    Patient is not progressing towards the following goals:

## 2022-02-20 NOTE — DIETARY
Nutrition Services:  Per nutrition representative, pt and his son requested nutrition education for diabetes. Met with pt and son at bedside. Provided pt with the T2DM handbook. Reviewed the plate method, carb servings and vegetarian protein options. Answered pt's questions regarding meal services and provided information for nutrition related diabetes resources.     Please consult RD PRN.   RD will continue to monitor per department policy.

## 2022-02-21 LAB
ANION GAP SERPL CALC-SCNC: 11 MMOL/L (ref 7–16)
BASOPHILS # BLD AUTO: 0.4 % (ref 0–1.8)
BASOPHILS # BLD: 0.02 K/UL (ref 0–0.12)
BUN SERPL-MCNC: 18 MG/DL (ref 8–22)
CALCIUM SERPL-MCNC: 9.2 MG/DL (ref 8.5–10.5)
CHLORIDE SERPL-SCNC: 102 MMOL/L (ref 96–112)
CO2 SERPL-SCNC: 26 MMOL/L (ref 20–33)
CREAT SERPL-MCNC: 0.78 MG/DL (ref 0.5–1.4)
EOSINOPHIL # BLD AUTO: 0.06 K/UL (ref 0–0.51)
EOSINOPHIL NFR BLD: 1.1 % (ref 0–6.9)
ERYTHROCYTE [DISTWIDTH] IN BLOOD BY AUTOMATED COUNT: 43 FL (ref 35.9–50)
GLUCOSE BLD-MCNC: 181 MG/DL (ref 65–99)
GLUCOSE BLD-MCNC: 196 MG/DL (ref 65–99)
GLUCOSE BLD-MCNC: 238 MG/DL (ref 65–99)
GLUCOSE BLD-MCNC: 316 MG/DL (ref 65–99)
GLUCOSE SERPL-MCNC: 164 MG/DL (ref 65–99)
HCT VFR BLD AUTO: 39.6 % (ref 42–52)
HGB BLD-MCNC: 13.5 G/DL (ref 14–18)
IMM GRANULOCYTES # BLD AUTO: 0.01 K/UL (ref 0–0.11)
IMM GRANULOCYTES NFR BLD AUTO: 0.2 % (ref 0–0.9)
LYMPHOCYTES # BLD AUTO: 2.1 K/UL (ref 1–4.8)
LYMPHOCYTES NFR BLD: 38 % (ref 22–41)
MCH RBC QN AUTO: 31.1 PG (ref 27–33)
MCHC RBC AUTO-ENTMCNC: 34.1 G/DL (ref 33.7–35.3)
MCV RBC AUTO: 91.2 FL (ref 81.4–97.8)
MONOCYTES # BLD AUTO: 0.52 K/UL (ref 0–0.85)
MONOCYTES NFR BLD AUTO: 9.4 % (ref 0–13.4)
NEUTROPHILS # BLD AUTO: 2.81 K/UL (ref 1.82–7.42)
NEUTROPHILS NFR BLD: 50.9 % (ref 44–72)
NRBC # BLD AUTO: 0 K/UL
NRBC BLD-RTO: 0 /100 WBC
PLATELET # BLD AUTO: 171 K/UL (ref 164–446)
PMV BLD AUTO: 10.2 FL (ref 9–12.9)
POTASSIUM SERPL-SCNC: 4 MMOL/L (ref 3.6–5.5)
RBC # BLD AUTO: 4.34 M/UL (ref 4.7–6.1)
SODIUM SERPL-SCNC: 139 MMOL/L (ref 135–145)
WBC # BLD AUTO: 5.5 K/UL (ref 4.8–10.8)

## 2022-02-21 PROCEDURE — A9270 NON-COVERED ITEM OR SERVICE: HCPCS | Performed by: STUDENT IN AN ORGANIZED HEALTH CARE EDUCATION/TRAINING PROGRAM

## 2022-02-21 PROCEDURE — 36415 COLL VENOUS BLD VENIPUNCTURE: CPT

## 2022-02-21 PROCEDURE — 770001 HCHG ROOM/CARE - MED/SURG/GYN PRIV*

## 2022-02-21 PROCEDURE — 700102 HCHG RX REV CODE 250 W/ 637 OVERRIDE(OP): Performed by: STUDENT IN AN ORGANIZED HEALTH CARE EDUCATION/TRAINING PROGRAM

## 2022-02-21 PROCEDURE — 97112 NEUROMUSCULAR REEDUCATION: CPT | Mod: CO

## 2022-02-21 PROCEDURE — A9270 NON-COVERED ITEM OR SERVICE: HCPCS | Performed by: HOSPITALIST

## 2022-02-21 PROCEDURE — 700102 HCHG RX REV CODE 250 W/ 637 OVERRIDE(OP): Performed by: HOSPITALIST

## 2022-02-21 PROCEDURE — 82962 GLUCOSE BLOOD TEST: CPT | Mod: 91

## 2022-02-21 PROCEDURE — 85025 COMPLETE CBC W/AUTO DIFF WBC: CPT

## 2022-02-21 PROCEDURE — 99233 SBSQ HOSP IP/OBS HIGH 50: CPT | Performed by: PHYSICAL MEDICINE & REHABILITATION

## 2022-02-21 PROCEDURE — 97535 SELF CARE MNGMENT TRAINING: CPT | Mod: CO

## 2022-02-21 PROCEDURE — 80048 BASIC METABOLIC PNL TOTAL CA: CPT

## 2022-02-21 PROCEDURE — 99232 SBSQ HOSP IP/OBS MODERATE 35: CPT | Performed by: HOSPITALIST

## 2022-02-21 PROCEDURE — 700111 HCHG RX REV CODE 636 W/ 250 OVERRIDE (IP): Performed by: STUDENT IN AN ORGANIZED HEALTH CARE EDUCATION/TRAINING PROGRAM

## 2022-02-21 RX ADMIN — INSULIN HUMAN 2 UNITS: 100 INJECTION, SOLUTION PARENTERAL at 12:19

## 2022-02-21 RX ADMIN — METFORMIN HYDROCHLORIDE 500 MG: 500 TABLET ORAL at 09:17

## 2022-02-21 RX ADMIN — INSULIN HUMAN 3 UNITS: 100 INJECTION, SOLUTION PARENTERAL at 20:38

## 2022-02-21 RX ADMIN — ASPIRIN 81 MG: 81 TABLET, COATED ORAL at 05:30

## 2022-02-21 RX ADMIN — CLOPIDOGREL BISULFATE 75 MG: 75 TABLET ORAL at 05:30

## 2022-02-21 RX ADMIN — DOCUSATE SODIUM 50 MG AND SENNOSIDES 8.6 MG 2 TABLET: 8.6; 5 TABLET, FILM COATED ORAL at 16:55

## 2022-02-21 RX ADMIN — ENOXAPARIN SODIUM 40 MG: 40 INJECTION SUBCUTANEOUS at 05:30

## 2022-02-21 RX ADMIN — INSULIN HUMAN 2 UNITS: 100 INJECTION, SOLUTION PARENTERAL at 09:14

## 2022-02-21 RX ADMIN — ATORVASTATIN CALCIUM 80 MG: 80 TABLET, FILM COATED ORAL at 05:30

## 2022-02-21 RX ADMIN — METFORMIN HYDROCHLORIDE 500 MG: 500 TABLET ORAL at 16:55

## 2022-02-21 RX ADMIN — INSULIN HUMAN 6 UNITS: 100 INJECTION, SOLUTION PARENTERAL at 16:53

## 2022-02-21 RX ADMIN — GABAPENTIN 300 MG: 300 CAPSULE ORAL at 20:38

## 2022-02-21 ASSESSMENT — ENCOUNTER SYMPTOMS
FEVER: 0
CHILLS: 0
WEAKNESS: 1

## 2022-02-21 ASSESSMENT — COGNITIVE AND FUNCTIONAL STATUS - GENERAL
DAILY ACTIVITIY SCORE: 14
TOILETING: A LOT
EATING MEALS: A LITTLE
DRESSING REGULAR LOWER BODY CLOTHING: A LOT
HELP NEEDED FOR BATHING: A LOT
DRESSING REGULAR UPPER BODY CLOTHING: A LOT
SUGGESTED CMS G CODE MODIFIER DAILY ACTIVITY: CK
PERSONAL GROOMING: A LITTLE

## 2022-02-21 ASSESSMENT — PAIN DESCRIPTION - PAIN TYPE
TYPE: ACUTE PAIN
TYPE: ACUTE PAIN

## 2022-02-21 NOTE — DISCHARGE PLANNING
Anticipated Discharge Disposition: RIRF    Action: LSW spoke to P[facility], they are unable to verify insurance today due to holiday. Patient reports it has been fixed and wife spoke to someone who collected half their copay today. LSW to f/u on insurance tomorrrow.    Barriers to Discharge: insurance showing inactive    Plan: LSW to f/u with [facility] tomorrow

## 2022-02-21 NOTE — DOCUMENTATION QUERY
[facility]                                                                       Query Response Note      PATIENT:               TRELL CSATLE  ACCT #:                  [id number]  MRN:                     [id number]  :                      1965  ADMIT DATE:       2022 9:37 AM  DISCH DATE:          RESPONDING  PROVIDER #:        [id number]           QUERY TEXT:    CVA and TIA are both documented in the Medical Record. After completed work-up, please specify if condition was:    The patient's Clinical Indicators include:  Patient c/o mild left facial droop, left sided weakness  PMH: HLD, CAD, DM Uncontrolled, HTN, CVA, TIA  MRI: acute infarct right posterior limb internal capsule and adjacent basal ganglia.  2.18 Hospitalist Progress Note: Discussed with patient and family MRI results which noted acute infarct   Neurology Progress Note: found to have a R internal capsule acute infarct. Radiagraphic appearance s/o lucunar (small vessel   ) etiology.   -  Hospitalist Assessment and Plan: TIA  TX: q4 hour neurochecks, PMR consult, risk factor control    Thank you,  Jagruti Escudero RN, CCDS  Clinical   Connect via [employer] Messenger  Options provided:   -- Acute Ischemic CVA d/t (please specify) _______________________________   -- TIA   -- Unable to determine      Query created by: Jagruti Escudero on 2022 6:43 AM    RESPONSE TEXT:    Acute Ischemic CVA d/t (please specify) _______________________________          Electronically signed by:  ESTER CARRANZA MD 2022 1:56 PM

## 2022-02-21 NOTE — PROGRESS NOTES
Physical Medicine and Rehabilitation Consultation              Date of initial consultation: 2/18/2022  Consulting provider: JULIET Tolentino   Reason for consultation: assess for acute inpatient rehab appropriateness  LOS: 3 Day(s)    Chief complaint: Left-sided weakness    HPI: The patient is a 56 y.o. right hand dominant male with a past medical history of hypertension, hyperlipidemia type 2 diabetes, coronary artery disease, history of left frontal stroke in 2018, Left BKA in 12/2019;  who presented on 2/17/2022  9:37 AM with left-sided weakness, left facial droop.  Patient was evaluated by neurology on arrival to [facility], found to have an NIH score of 2 for left facial droop and left arm weakness.  Patient has been compliant with aspirin and Plavix.  Patient did not require TPA.  CT head showed no acute abnormalities, CT perfusion showed no mismatch.  MR brain found acute infarct in the right posterior limb of internal capsule adjacent to the basal ganglia.  MR C-spine was also pursued which found mild cervical degenerative changes.     Of note, patient was treated at [facility] rehab in late December 2019 and successfully discharged home with family support in early January 2020 for L BKA by Dr. Zhao.  Patient was treated by Dr. Alejandro Bai MD at that time.    The patient currently reports numbness and tingling in his left arm and leg. He also endorses dizziness and feeling light headed. He is joined in the room by his son and wife. Patients wife states that she works FT days but is willing to take time off to care for him. Son lives in [place] and is visiting.     2/21/2022  Patient is improving.  He is now able to lift his left arm above his head.  Patient states his insurance issues have been resolved.  On our side, we are unable to confirm this as it is a holiday (Presidents' Day) and the authorizing office is closed.  No new aches or pains, patient is sleeping well, appetite is good, overall  feeling okay today    ROS  Pertinent positives are mentioned in the HPI, all others reviewed and are negative.    Social Hx:  1 SH  1 ZACH  With: Spouse    THERAPY:  Restrictions: Fall risk   PT: Functional mobility   2/18: Unable to participate in gait, min assist sit to stand    OT: ADLs  2/18: Max assist lower body dressing, min assist toileting    SLP:   2/18: Regular diet thin liquids    IMAGING:  MR brain 2/17/2022     Acute infarct in the right posterior limb internal capsule and adjacent basal ganglia.     Multiple remote lacunar infarcts involving the bilateral cerebral hemispheric deep white matter. These are more numerous than on the previous study from 2018.     Interval progression of chronic deep white matter microvascular ischemic changes.     Gradient echo hypointense lesions along the medial right temporal cortex involving the hippocampus and the medial right parietal region may represent focal microhemorrhages related to hypertension.    PROCEDURES:  None     PMH:  Past Medical History:   Diagnosis Date   • CAD (coronary artery disease)    • Diabetes (HCC)    • Hyperlipidemia    • Hypertension        PSH:  Past Surgical History:   Procedure Laterality Date   • KNEE AMPUTATION BELOW Left 12/20/2019    Procedure: AMPUTATION, BELOW KNEE;  Surgeon: Koby Zhao M.D.;  Location: SURGERY [facility];  Service: Orthopedics   • ZZZ CARDIAC CATH  12/16/2019    multi-vessel disease   • IRRIGATION & DEBRIDEMENT ORTHO Left 6/24/2019    Procedure: IRRIGATION AND DEBRIDEMENT, WOUND-FOOT, BIOLOGIC PLACEMENT, WOUND VAC PLACEMENT;  Surgeon: Charles Chopra M.D.;  Location: SURGERY [facility];  Service: Orthopedics       FHX:  Family History   Problem Relation Age of Onset   • Diabetes Mother    • Diabetes Father        Medications:  Current Facility-Administered Medications   Medication Dose   • metFORMIN (GLUCOPHAGE) tablet 500 mg  500 mg   • gabapentin (NEURONTIN) capsule 300 mg  300 mg   •  senna-docusate (PERICOLACE or SENOKOT S) 8.6-50 MG per tablet 2 Tablet  2 Tablet    And   • polyethylene glycol/lytes (MIRALAX) PACKET 1 Packet  1 Packet    And   • magnesium hydroxide (MILK OF MAGNESIA) suspension 30 mL  30 mL    And   • bisacodyl (DULCOLAX) suppository 10 mg  10 mg   • enoxaparin (LOVENOX) inj 40 mg  40 mg   • labetalol (NORMODYNE/TRANDATE) injection 10 mg  10 mg   • ondansetron (ZOFRAN) syringe/vial injection 4 mg  4 mg   • ondansetron (ZOFRAN ODT) dispertab 4 mg  4 mg   • promethazine (PHENERGAN) tablet 12.5-25 mg  12.5-25 mg   • promethazine (PHENERGAN) suppository 12.5-25 mg  12.5-25 mg   • prochlorperazine (COMPAZINE) injection 5-10 mg  5-10 mg   • insulin regular (HumuLIN R,NovoLIN R) injection  2-9 Units    And   • dextrose 50% (D50W) injection 50 mL  50 mL   • atorvastatin (LIPITOR) tablet 80 mg  80 mg   • clopidogrel (PLAVIX) tablet 75 mg  75 mg   • [Held by provider] metoprolol SR (TOPROL XL) tablet 12.5 mg  12.5 mg   • aspirin EC (ECOTRIN) tablet 81 mg  81 mg       Allergies:  No Known Allergies      Physical Exam:  Vitals: /86   Pulse 83   Temp 36.4 °C (97.6 °F) (Temporal)   Resp 16   Ht 1.829 m (6')   Wt 74.3 kg (163 lb 12.8 oz)   SpO2 97%   Gen: NAD  Head: NC/AT  Eyes/ Nose/ Mouth: PERRLA, moist mucous membranes  Cardio: RRR, good distal perfusion, warm extremities  Pulm: normal respiratory effort, no cyanosis   Abd: Soft NTND, negative borborygmi   Ext: No peripheral edema. No calf tenderness. No clubbing.      Labs: Reviewed and significant for   Recent Labs     02/19/22  0501 02/20/22  0224 02/21/22  0517   RBC 4.66* 4.40* 4.34*   HEMOGLOBIN 14.5 13.4* 13.5*   HEMATOCRIT 42.1 40.1* 39.6*   PLATELETCT 182 177 171     Recent Labs     02/19/22  0501 02/20/22  0224 02/21/22  0517   SODIUM 137 139 139   POTASSIUM 4.0 4.0 4.0   CHLORIDE 104 104 102   CO2 21 24 26   GLUCOSE 163* 186* 164*   BUN 12 19 18   CREATININE 0.69 0.78 0.78   CALCIUM 9.0 9.3 9.2     Recent Results  (from the past 24 hour(s))   POCT glucose device results    Collection Time: 02/20/22  1:05 PM   Result Value Ref Range    Glucose - Accu-Ck 208 (H) 65 - 99 mg/dL   POCT glucose device results    Collection Time: 02/20/22  5:04 PM   Result Value Ref Range    Glucose - Accu-Ck 189 (H) 65 - 99 mg/dL   POCT glucose device results    Collection Time: 02/20/22  9:20 PM   Result Value Ref Range    Glucose - Accu-Ck 264 (H) 65 - 99 mg/dL   CBC WITH DIFFERENTIAL    Collection Time: 02/21/22  5:17 AM   Result Value Ref Range    WBC 5.5 4.8 - 10.8 K/uL    RBC 4.34 (L) 4.70 - 6.10 M/uL    Hemoglobin 13.5 (L) 14.0 - 18.0 g/dL    Hematocrit 39.6 (L) 42.0 - 52.0 %    MCV 91.2 81.4 - 97.8 fL    MCH 31.1 27.0 - 33.0 pg    MCHC 34.1 33.7 - 35.3 g/dL    RDW 43.0 35.9 - 50.0 fL    Platelet Count 171 164 - 446 K/uL    MPV 10.2 9.0 - 12.9 fL    Neutrophils-Polys 50.90 44.00 - 72.00 %    Lymphocytes 38.00 22.00 - 41.00 %    Monocytes 9.40 0.00 - 13.40 %    Eosinophils 1.10 0.00 - 6.90 %    Basophils 0.40 0.00 - 1.80 %    Immature Granulocytes 0.20 0.00 - 0.90 %    Nucleated RBC 0.00 /100 WBC    Neutrophils (Absolute) 2.81 1.82 - 7.42 K/uL    Lymphs (Absolute) 2.10 1.00 - 4.80 K/uL    Monos (Absolute) 0.52 0.00 - 0.85 K/uL    Eos (Absolute) 0.06 0.00 - 0.51 K/uL    Baso (Absolute) 0.02 0.00 - 0.12 K/uL    Immature Granulocytes (abs) 0.01 0.00 - 0.11 K/uL    NRBC (Absolute) 0.00 K/uL   Basic Metabolic Panel    Collection Time: 02/21/22  5:17 AM   Result Value Ref Range    Sodium 139 135 - 145 mmol/L    Potassium 4.0 3.6 - 5.5 mmol/L    Chloride 102 96 - 112 mmol/L    Co2 26 20 - 33 mmol/L    Glucose 164 (H) 65 - 99 mg/dL    Bun 18 8 - 22 mg/dL    Creatinine 0.78 0.50 - 1.40 mg/dL    Calcium 9.2 8.5 - 10.5 mg/dL    Anion Gap 11.0 7.0 - 16.0   ESTIMATED GFR    Collection Time: 02/21/22  5:17 AM   Result Value Ref Range    GFR If African American >60 >60 mL/min/1.73 m 2    GFR If Non African American >60 >60 mL/min/1.73 m 2   POCT glucose  device results    Collection Time: 22  8:43 AM   Result Value Ref Range    Glucose - Accu-Ck 181 (H) 65 - 99 mg/dL         ASSESSMENT:  Patient is a 56 y.o. male admitted with left sided weakness and sensory changes, found to have right PLIC acute infarct.      [facility] Code / Diagnosis to Support: 0001.1 - Stroke: Left Body Involvement (Right Brain)    Rehabilitation: Impaired ADLs and mobility  Patient is a good candidate for inpatient rehab based on needs for PT, OT.  Patient will also benefit from family training.  Patient has a good discharge situation which will be home with spouse and son.     Barriers to transfer include: Insurance authorization, TCCs to verify disposition, medical clearance and bed availability     All cases are subject to administrative review and recommendations may change    Additional Recommendations:  -Good candidate for IPR, awaiting insurance authorization.  Unable to submit today as it is Presidents' Day and the authorizing office is closed.  -We will need updated PT OT notes as these evaluations will  by tomorrow  - Continue aspirin, statin, Plavix per neurology recommendations  - Tight control of diabetic blood glucose levels  - Tight control of blood pressure under 140/90  -Continue gabapentin 300 mg TID for neuropathic pain. High risk medication, Labs reviewed, CrCl 111 GFR >60  -PMR to follow in the periphery for rehab appropriateness, please reach out with questions or request for medical management      Medical Complexity:  HTN   Stroke   BKA   DM2    DVT PPX: SCDs      Thank you for allowing us to participate in the care of this patient.     Patient was seen for 36 minutes on unit/floor of which > 50% of time was spent on counseling and coordination of care regarding the above, including prognosis, risk reduction, benefits of treatment, and options for next stage of care.      Michael Kerns, DO   Physical Medicine and Rehabilitation     Please note that this dictation  was created using voice recognition software. I have made every reasonable attempt to correct obvious errors, but there may be errors of grammar and possibly content that I did not discover before finalizing the note.

## 2022-02-21 NOTE — CARE PLAN
The patient is Stable - Low risk of patient condition declining or worsening    Shift Goals  Clinical Goals: Monitor neuro status. BG and BP  Patient Goals: Rest  Family Goals: KEVIN    Progress made toward(s) clinical / shift goals:  Stable neuros, corrected BG, BP have been WDL, Pt has a L BKA, treaded slipper socks on, educated on the need to call for assistance to get out of bed, call light within reach, family at the bedside      Problem: Knowledge Deficit - Standard  Goal: Patient and family/care givers will demonstrate understanding of plan of care, disease process/condition, diagnostic tests and medications  Outcome: Progressing     Problem: Skin Integrity  Goal: Skin integrity is maintained or improved  Outcome: Progressing       Patient is not progressing towards the following goals:

## 2022-02-21 NOTE — PROGRESS NOTES
[facility] Medicine Daily Progress Note    Date of Service  2/21/2022    Chief Complaint  Israel Aviles is a 56 y.o. male admitted 2/17/2022 with LEFT sided weakness.    Hospital Course  Mr. Manan Aviles is a 56-year-old male with a PMHx of HTN, HLD, DMT2, CAD, history of left frontal stroke in 2018, admitted on 2/17/2022 due to left sided deficits.    Patient reports waking up at 2 AM a noting weakness in his left arm.  EMS was activated and upon arrival at [facility], patient noted to have subtle left facial droop, left arm drift.  NIHSS score was at 2.  Patient reports he has been compliant with medication including ASA and Plavix therapy.  Stroke protocol was initiated and CT of the head did not reveal any acute hemorrhage, or large territory stroke, or critical stenosis, or large vessel occlusions.  CTA also noted no focal perfusion defect.  Patient reports that he has had a similar episode in 2021 where he was seen here at [facility] due to LUE weakness which eventually resolved.  MRI was obtained in 2021 which was negative for acute infarct at that time.  Upon examination, no infection prodrome, no constitutional symptoms, does not use recreational drugs, and denies any use of cigarettes.  Patient admitted into the observation unit for further evaluation.      Interval Problem Update  Seen and examined. Chart reviewed, including labs and any pertinent imaging.  Weakness improving, PMR following  Pending rehab admission, awaiting on insurance approval  Change gabapentin to nightly, patient did not like how he felt on 3 times daily dosing    I have personally seen and examined the patient at bedside. I discussed the plan of care with patient, family, bedside RN and .    Consultants/Specialty  neurology    Code Status  Full Code    Disposition  Patient is not medically cleared for discharge.   Anticipate discharge to to home with close outpatient follow-up.  I have placed the appropriate orders for  post-discharge needs.    Review of Systems  Review of Systems   Constitutional: Negative for chills and fever.   Cardiovascular: Negative for chest pain.   Neurological: Positive for weakness.      Physical Exam  Temp:  [36 °C (96.8 °F)-36.4 °C (97.6 °F)] 36.4 °C (97.6 °F)  Pulse:  [78-93] 83  Resp:  [16] 16  BP: (125-143)/(80-86) 143/86  SpO2:  [94 %-100 %] 97 %    Physical Exam  Vitals and nursing note reviewed.   Constitutional:       General: He is not in acute distress.     Appearance: He is not diaphoretic.   HENT:      Head: Normocephalic and atraumatic.      Nose: No congestion.      Mouth/Throat:      Mouth: Mucous membranes are moist.   Eyes:      General:         Right eye: No discharge.         Left eye: No discharge.      Extraocular Movements: Extraocular movements intact.      Conjunctiva/sclera: Conjunctivae normal.   Cardiovascular:      Rate and Rhythm: Normal rate.      Pulses: Normal pulses.   Pulmonary:      Effort: Pulmonary effort is normal. No respiratory distress.      Breath sounds: No wheezing.   Abdominal:      General: Abdomen is flat. There is no distension.      Palpations: Abdomen is soft.      Tenderness: There is no abdominal tenderness.   Musculoskeletal:         General: Normal range of motion.      Cervical back: Normal range of motion. No rigidity.   Skin:     General: Skin is warm and dry.      Coloration: Skin is not jaundiced.   Neurological:      Mental Status: He is alert.      Motor: Weakness present.   Psychiatric:         Mood and Affect: Mood normal.         Thought Content: Thought content normal.         Fluids    Intake/Output Summary (Last 24 hours) at 2/21/2022 1112  Last data filed at 2/21/2022 0804  Gross per 24 hour   Intake --   Output 620 ml   Net -620 ml       Laboratory  Recent Labs     02/19/22  0501 02/20/22  0224 02/21/22  0517   WBC 5.6 4.6* 5.5   RBC 4.66* 4.40* 4.34*   HEMOGLOBIN 14.5 13.4* 13.5*   HEMATOCRIT 42.1 40.1* 39.6*   MCV 90.3 91.1 91.2   MCH  31.1 30.5 31.1   MCHC 34.4 33.4* 34.1   RDW 43.2 43.1 43.0   PLATELETCT 182 177 171   MPV 10.0 10.0 10.2     Recent Labs     02/19/22  0501 02/20/22  0224 02/21/22  0517   SODIUM 137 139 139   POTASSIUM 4.0 4.0 4.0   CHLORIDE 104 104 102   CO2 21 24 26   GLUCOSE 163* 186* 164*   BUN 12 19 18   CREATININE 0.69 0.78 0.78   CALCIUM 9.0 9.3 9.2                   Imaging  EC-ECHOCARDIOGRAM COMPLETE W/O CONT   Final Result      MR-CERVICAL SPINE-W/O   Final Result         Mild cervical spine degenerative changes without significant spinal canal stenosis or foraminal narrowing.      Remote right pontine lacunar infarct noted.      MR-BRAIN-W/O   Final Result         Acute infarct in the right posterior limb internal capsule and adjacent basal ganglia.      Multiple remote lacunar infarcts involving the bilateral cerebral hemispheric deep white matter. These are more numerous than on the previous study from 2018.      Interval progression of chronic deep white matter microvascular ischemic changes.      Gradient echo hypointense lesions along the medial right temporal cortex involving the hippocampus and the medial right parietal region may represent focal microhemorrhages related to hypertension.      DX-CHEST-PORTABLE (1 VIEW)   Final Result      No acute cardiopulmonary abnormality.      CT-CEREBRAL PERFUSION ANALYSIS   Final Result      1.  Cerebral blood flow less than 30% likely representing completed infarct = 0 mL.      2.  T Max more than 6 seconds likely representing combination of completed infarct and ischemia = 0 mL.      3.  Mismatched volume likely representing ischemic brain/penumbra = None      4.  Please note that the cerebral perfusion was performed on the limited brain tissue around the basal ganglia region. Infarct/ischemia outside the CT perfusion sections can be missed in this study.      CT-CTA NECK WITH & W/O-POST PROCESSING   Final Result      Atherosclerosis of the bilateral carotid  bifurcations/bulbs, left greater than right, with less than 50% stenosis.      Patent vertebral arteries.      CT-CTA HEAD WITH & W/O-POST PROCESS   Final Result      No thrombosis is seen within the [place].      CT-HEAD W/O   Final Result      1.  No acute intracranial abnormality is identified.   2.  There are periventricular and subcortical white matter changes present.  This finding is nonspecific and could be from previous small vessel ischemia, demyelination, or gliosis.   3.  Focal hypodensities in the basal ganglia bilaterally, daysi and periventricular white matter on the left likely represent old lacunar infarcts.   4.  Paranasal sinus disease as above described.                 Assessment/Plan  * Neurological deficit present- (present on admission)  Assessment & Plan  -Patient coming in for left-sided arm and leg weakness  -Symptoms similar to an episode he had 6 months ago that was deemed to TIA  -Still having mildly diminished strength in his left upper extremity  -CT imaging of head and neck in ER largely unremarkable  -Continue home aspirin 81 mg daily  -Continue home atorvastatin 80 mg daily  -Continue home clopidogrel 75 mg daily  Follow-up [id number] genetic testing    TIA (transient ischemic attack)- (present on admission)  Assessment & Plan  -Neurology saw patient upon arrival to ED, recommended MRI brain and C-spine for evaluation of possible cord compression/for visualization/characterization of old infarct(s). Also recommends holding off with further cardiac monitoring.   -Continue with DAPT, Statin at current dosages for stroke optimization.     Type 2 diabetes mellitus with other specified complication (HCC)- (present on admission)  Assessment & Plan  -Takes glipizide and metformin at home for his diabetes  -Most recent Hgb A1c in Nov 2021 was 8.4%  -Sliding scale insulin  -Resume metformin       VTE prophylaxis: therapeutic anticoagulation with Plavix    I have performed a physical  exam and reviewed and updated ROS and Plan today (2/21/2022). In review of yesterday's note (2/20/2022), there are no changes except as documented above.

## 2022-02-21 NOTE — CARE PLAN
The patient is Stable - Low risk of patient condition declining or worsening    Shift Goals  Clinical Goals: Monitor neuro status  Patient Goals: Rest  Family Goals: KEVIN    Progress made toward(s) clinical / shift goals:      Problem: Neuro Status  Goal: Neuro status will remain stable or improve  Outcome: Progressing   Q4H neuro checks in place. Pt remains A/Ox4 throughout shift.     Patient is not progressing towards the following goals:

## 2022-02-22 PROBLEM — D64.9 ANEMIA: Status: ACTIVE | Noted: 2022-02-22

## 2022-02-22 LAB
GLUCOSE BLD STRIP.AUTO-MCNC: 200 MG/DL (ref 65–99)
GLUCOSE BLD STRIP.AUTO-MCNC: 218 MG/DL (ref 65–99)
GLUCOSE BLD STRIP.AUTO-MCNC: 253 MG/DL (ref 65–99)
GLUCOSE BLD-MCNC: 156 MG/DL (ref 65–99)

## 2022-02-22 PROCEDURE — 770001 HCHG ROOM/CARE - MED/SURG/GYN PRIV*

## 2022-02-22 PROCEDURE — 700102 HCHG RX REV CODE 250 W/ 637 OVERRIDE(OP): Performed by: STUDENT IN AN ORGANIZED HEALTH CARE EDUCATION/TRAINING PROGRAM

## 2022-02-22 PROCEDURE — 700102 HCHG RX REV CODE 250 W/ 637 OVERRIDE(OP): Performed by: HOSPITALIST

## 2022-02-22 PROCEDURE — 97116 GAIT TRAINING THERAPY: CPT

## 2022-02-22 PROCEDURE — 97535 SELF CARE MNGMENT TRAINING: CPT

## 2022-02-22 PROCEDURE — 700111 HCHG RX REV CODE 636 W/ 250 OVERRIDE (IP): Performed by: STUDENT IN AN ORGANIZED HEALTH CARE EDUCATION/TRAINING PROGRAM

## 2022-02-22 PROCEDURE — A9270 NON-COVERED ITEM OR SERVICE: HCPCS | Performed by: STUDENT IN AN ORGANIZED HEALTH CARE EDUCATION/TRAINING PROGRAM

## 2022-02-22 PROCEDURE — 99232 SBSQ HOSP IP/OBS MODERATE 35: CPT | Performed by: INTERNAL MEDICINE

## 2022-02-22 PROCEDURE — A9270 NON-COVERED ITEM OR SERVICE: HCPCS | Performed by: HOSPITALIST

## 2022-02-22 PROCEDURE — 97112 NEUROMUSCULAR REEDUCATION: CPT

## 2022-02-22 PROCEDURE — 82962 GLUCOSE BLOOD TEST: CPT | Mod: 91

## 2022-02-22 PROCEDURE — 97530 THERAPEUTIC ACTIVITIES: CPT

## 2022-02-22 RX ADMIN — METFORMIN HYDROCHLORIDE 500 MG: 500 TABLET ORAL at 18:06

## 2022-02-22 RX ADMIN — INSULIN HUMAN 2 UNITS: 100 INJECTION, SOLUTION PARENTERAL at 18:00

## 2022-02-22 RX ADMIN — DOCUSATE SODIUM 50 MG AND SENNOSIDES 8.6 MG 2 TABLET: 8.6; 5 TABLET, FILM COATED ORAL at 18:06

## 2022-02-22 RX ADMIN — INSULIN HUMAN 2 UNITS: 100 INJECTION, SOLUTION PARENTERAL at 08:42

## 2022-02-22 RX ADMIN — ENOXAPARIN SODIUM 40 MG: 40 INJECTION SUBCUTANEOUS at 04:20

## 2022-02-22 RX ADMIN — GABAPENTIN 300 MG: 300 CAPSULE ORAL at 20:50

## 2022-02-22 RX ADMIN — INSULIN HUMAN 5 UNITS: 100 INJECTION, SOLUTION PARENTERAL at 20:50

## 2022-02-22 RX ADMIN — METFORMIN HYDROCHLORIDE 500 MG: 500 TABLET ORAL at 08:44

## 2022-02-22 RX ADMIN — INSULIN HUMAN 3 UNITS: 100 INJECTION, SOLUTION PARENTERAL at 12:48

## 2022-02-22 RX ADMIN — ATORVASTATIN CALCIUM 80 MG: 80 TABLET, FILM COATED ORAL at 04:20

## 2022-02-22 RX ADMIN — CLOPIDOGREL BISULFATE 75 MG: 75 TABLET ORAL at 04:20

## 2022-02-22 RX ADMIN — ASPIRIN 81 MG: 81 TABLET, COATED ORAL at 04:20

## 2022-02-22 ASSESSMENT — ENCOUNTER SYMPTOMS
NAUSEA: 0
FEVER: 0
VOMITING: 0
LOSS OF CONSCIOUSNESS: 0
CHILLS: 0
DOUBLE VISION: 0
WEAKNESS: 1
BLURRED VISION: 0
COUGH: 0
ABDOMINAL PAIN: 0
SHORTNESS OF BREATH: 0

## 2022-02-22 ASSESSMENT — GAIT ASSESSMENTS
GAIT LEVEL OF ASSIST: MINIMAL ASSIST
DISTANCE (FEET): 25
ASSISTIVE DEVICE: FRONT WHEEL WALKER

## 2022-02-22 ASSESSMENT — COGNITIVE AND FUNCTIONAL STATUS - GENERAL
HELP NEEDED FOR BATHING: A LOT
EATING MEALS: A LITTLE
TURNING FROM BACK TO SIDE WHILE IN FLAT BAD: A LITTLE
MOBILITY SCORE: 13
STANDING UP FROM CHAIR USING ARMS: A LOT
MOVING FROM LYING ON BACK TO SITTING ON SIDE OF FLAT BED: A LOT
SUGGESTED CMS G CODE MODIFIER MOBILITY: CL
DAILY ACTIVITIY SCORE: 16
DRESSING REGULAR UPPER BODY CLOTHING: A LITTLE
SUGGESTED CMS G CODE MODIFIER DAILY ACTIVITY: CK
TOILETING: A LITTLE
PERSONAL GROOMING: A LITTLE
CLIMB 3 TO 5 STEPS WITH RAILING: TOTAL
DRESSING REGULAR LOWER BODY CLOTHING: A LOT
WALKING IN HOSPITAL ROOM: A LOT
MOVING TO AND FROM BED TO CHAIR: A LITTLE

## 2022-02-22 ASSESSMENT — PAIN DESCRIPTION - PAIN TYPE: TYPE: ACUTE PAIN

## 2022-02-22 NOTE — CARE PLAN
The patient is Stable - Low risk of patient condition declining or worsening    Shift Goals  Clinical Goals: Monitor neuro status/Monitor ACHS glucose accu-checks  Patient Goals: Sleep  Family Goals: Rest    Progress made toward(s) clinical / shift goals: Q4hr neuro checks are in place, patient is maintaining stable neurological exam. Insulin Regular coverage given for bedtime glucose accu-check. Patient pivots well from wheelchair, prosthetic at bedside. Fall precautions are in place. Bed is low and locked, bed alarm is on, call light is within reach, hourly rounding continues.      Problem: Fall Risk  Goal: Patient will remain free from falls  Outcome: Progressing  Note: Yellow fall risk bracelet is on, bed alarm is on, fall risk sign is outside of door.      Problem: Skin Integrity  Goal: Skin integrity is maintained or improved  Outcome: Progressing  Note: Encouraging Q2hr turns.      Problem: Knowledge Deficit - Stroke Education  Goal: Patient's knowledge of stroke and risk factors will improve  Outcome: Progressing    Patient is not progressing towards the following goals:N/A

## 2022-02-22 NOTE — THERAPY
Occupational Therapy  Daily Treatment     Patient Name: Israel Aviles  Age:  56 y.o., Sex:  male  Medical Record #: [id number]  Today's Date: 2/22/2022     Precautions  Precautions: (P) Fall Risk  Comments: (P) L genie    Assessment    Pt seen for follow up OT tx session, highly motivated to participate with improving LUE strength and coordination, provided a platform for patients FWW for shoulder stabilization/protection as well as better control of AD for mobility. Pt able to complete multiple transfers to wheelchair, ambulate in room and transfer to toilet with less overall assistance than previous sessions, still significant assistance needed for ADLs but improving. Will continue to see for skilled therapy while admitted as well as recommend post-acute placement.    Plan    Continue current treatment plan.    DC Equipment Recommendations: Unable to determine at this time  Discharge Recommendations: Recommend post-acute placement for additional occupational therapy services prior to discharge home    Objective       02/22/22 0954   Precautions   Precautions Fall Risk   Comments L genie   Cognition    Level of Consciousness Alert   Comments Motivated to participate, cooperative   Strength Upper Body   Comments LUE improving more distally than proximal   Upper Body Muscle Tone   Lt Upper Extremity Muscle Tone Hypotonic   Other Treatments   Other Treatments Provided Added a left platform to patients FWW for shoulder stabilization/weightbearing and better control of AD for ambulation   Balance   Sitting Balance (Static) Fair   Sitting Balance (Dynamic) Fair -   Standing Balance (Static) Fair -   Standing Balance (Dynamic) Trace +   Weight Shift Sitting Fair   Weight Shift Standing Poor   Skilled Intervention Verbal Cuing   Bed Mobility    Scooting Contact Guard Assist   Skilled Intervention Verbal Cuing   Comments up seated at the EOB upon arrival   Activities of Daily Living   Grooming Supervision;Seated   Upper  Body Dressing Minimal Assist   Lower Body Dressing Maximal Assist   Toileting Contact Guard Assist   Skilled Intervention Verbal Cuing   How much help from another person does the patient currently need...   Putting on and taking off regular lower body clothing? 2   Bathing (including washing, rinsing, and drying)? 2   Toileting, which includes using a toilet, bedpan, or urinal? 3   Putting on and taking off regular upper body clothing? 3   Taking care of personal grooming such as brushing teeth? 3   Eating meals? 3   6 Clicks Daily Activity Score 16   Functional Mobility   Sit to Stand Minimal Assist   Bed, Chair, Wheelchair Transfer Moderate Assist   Toilet Transfers Moderate Assist   Transfer Method Stand Step   Mobility STS from EOB, mobility in room x2, transfer to wheelchair, wheelchair to toilet, left in wheelchair   Skilled Intervention Verbal Cuing   Comments w/ platform FWW   Activity Tolerance   Sitting in Chair 10   Sitting Edge of Bed 5 min   Standing 5 min   Patient / Family Goals   Patient / Family Goal #1 regain independence   Goal #1 Outcome Progressing as expected   Short Term Goals   Short Term Goal # 1 pt will demo functional transfer with [place]   Goal Outcome # 1 Progressing as expected   Short Term Goal # 2 pt will complete UB dress with SPV   Goal Outcome # 2 Progressing as expected   Education Group   Education Provided Role of Occupational Therapist;Joint Protection   Role of Occupational Therapist Patient Response Acceptance;Patient;Explanation   Joint Protection Patient Response Patient;Acceptance;Demonstration;Explanation;Action Demonstration   Interdisciplinary Plan of Care Collaboration   IDT Collaboration with  Nursing   Patient Position at End of Therapy Seated;Chair Alarm On;Call Light within Reach;Tray Table within Reach;Phone within Reach   Collaboration Comments RN updated

## 2022-02-22 NOTE — PREADMISSION SCREENING NOTE
Updated Pre-Screen Assessment     Name: Israel Aviles  MRN: [id number]  : 1965    Medical Status/ Changes:     Benigno Martínez D.O.   Physician   Hospital Medicine   Progress Notes      Signed   Date of Service:  2022  1:38 PM                         []Hide copied text    []Marciano for details    Hospital Medicine Daily Progress Note     Date of Service  2022     Chief Complaint  Israel Aviles is a 56 y.o. male admitted 2022 with LEFT sided weakness.     Hospital Course  Mr. Manan Aviles is a 56-year-old male with a PMHx of HTN, HLD, DMT2, CAD, history of left frontal stroke in 2018, admitted on 2022 due to left sided deficits.     Patient reports waking up at 2 AM a noting weakness in his left arm.  EMS was activated and upon arrival at [facility], patient noted to have subtle left facial droop, left arm drift.  NIHSS score was at 2.  Patient reports he has been compliant with medication including ASA and Plavix therapy.  Stroke protocol was initiated and CT of the head did not reveal any acute hemorrhage, or large territory stroke, or critical stenosis, or large vessel occlusions.  CTA also noted no focal perfusion defect.  Patient reports that he has had a similar episode in  where he was seen here at [facility] due to LUE weakness which eventually resolved.  MRI was obtained in  which was negative for acute infarct at that time.  Upon examination, no infection prodrome, no constitutional symptoms, does not use recreational drugs, and denies any use of cigarettes.  Patient admitted into the observation unit for further evaluation.        Interval Problem Update  Patient was seen and examined at bedside.  No acute events overnight. Patient is resting comfortably in bed and in no acute distress.      IPR placement     I have personally seen and examined the patient at bedside. I discussed the plan of care with patient, family, bedside RN and case  manager.     Consultants/Specialty  neurology     Code Status  Full Code     Disposition  Patient is not medically cleared for discharge.   Anticipate discharge to to home with close outpatient follow-up.  I have placed the appropriate orders for post-discharge needs.     Review of Systems  Review of Systems   Constitutional: Negative for chills and fever.   Eyes: Negative for blurred vision and double vision.   Respiratory: Negative for cough and shortness of breath.    Cardiovascular: Negative for chest pain.   Gastrointestinal: Negative for abdominal pain, nausea and vomiting.   Genitourinary: Negative for dysuria and frequency.   Neurological: Positive for weakness. Negative for loss of consciousness.      Physical Exam  Temp:  [36.1 °C (97 °F)-36.6 °C (97.8 °F)] 36.4 °C (97.6 °F)  Pulse:  [84-95] 85  Resp:  [16-18] 18  BP: (129-136)/(84-86) 131/84  SpO2:  [95 %-99 %] 96 %     Physical Exam  Vitals and nursing note reviewed.   Constitutional:       General: He is not in acute distress.     Appearance: He is not diaphoretic.   HENT:      Head: Normocephalic and atraumatic.      Right Ear: External ear normal.      Left Ear: External ear normal.      Nose: No congestion.      Mouth/Throat:      Mouth: Mucous membranes are moist.   Eyes:      General: No scleral icterus.        Right eye: No discharge.         Left eye: No discharge.      Extraocular Movements: Extraocular movements intact.      Conjunctiva/sclera: Conjunctivae normal.   Cardiovascular:      Rate and Rhythm: Normal rate.      Pulses: Normal pulses.      Heart sounds: No murmur heard.  Pulmonary:      Effort: Pulmonary effort is normal.      Breath sounds: No wheezing.   Abdominal:      General: Abdomen is flat.      Palpations: Abdomen is soft.      Tenderness: There is no abdominal tenderness. There is no guarding or rebound.   Musculoskeletal:         General: No swelling or deformity. Normal range of motion.      Cervical back: Normal range of  motion. No rigidity.   Skin:     General: Skin is warm and dry.      Coloration: Skin is not jaundiced.      Findings: No bruising.   Neurological:      Mental Status: He is alert.      Motor: Weakness present.   Psychiatric:         Mood and Affect: Mood normal.         Thought Content: Thought content normal.            Fluids     Intake/Output Summary (Last 24 hours) at 2/22/2022 1401  Last data filed at 2/22/2022 0800      Gross per 24 hour   Intake 60 ml   Output 1000 ml   Net -940 ml         Laboratory       Recent Labs     02/20/22 0224 02/21/22  0517   WBC 4.6* 5.5   RBC 4.40* 4.34*   HEMOGLOBIN 13.4* 13.5*   HEMATOCRIT 40.1* 39.6*   MCV 91.1 91.2   MCH 30.5 31.1   MCHC 33.4* 34.1   RDW 43.1 43.0   PLATELETCT 177 171   MPV 10.0 10.2           Recent Labs     02/20/22 0224 02/21/22  0517   SODIUM 139 139   POTASSIUM 4.0 4.0   CHLORIDE 104 102   CO2 24 26   GLUCOSE 186* 164*   BUN 19 18   CREATININE 0.78 0.78   CALCIUM 9.3 9.2                     Imaging  EC-ECHOCARDIOGRAM COMPLETE W/O CONT   Final Result       MR-CERVICAL SPINE-W/O   Final Result           Mild cervical spine degenerative changes without significant spinal canal stenosis or foraminal narrowing.       Remote right pontine lacunar infarct noted.       MR-BRAIN-W/O   Final Result           Acute infarct in the right posterior limb internal capsule and adjacent basal ganglia.       Multiple remote lacunar infarcts involving the bilateral cerebral hemispheric deep white matter. These are more numerous than on the previous study from 2018.       Interval progression of chronic deep white matter microvascular ischemic changes.       Gradient echo hypointense lesions along the medial right temporal cortex involving the hippocampus and the medial right parietal region may represent focal microhemorrhages related to hypertension.       DX-CHEST-PORTABLE (1 VIEW)   Final Result       No acute cardiopulmonary abnormality.       CT-CEREBRAL PERFUSION  ANALYSIS   Final Result       1.  Cerebral blood flow less than 30% likely representing completed infarct = 0 mL.       2.  T Max more than 6 seconds likely representing combination of completed infarct and ischemia = 0 mL.       3.  Mismatched volume likely representing ischemic brain/penumbra = None       4.  Please note that the cerebral perfusion was performed on the limited brain tissue around the basal ganglia region. Infarct/ischemia outside the CT perfusion sections can be missed in this study.       CT-CTA NECK WITH & W/O-POST PROCESSING   Final Result       Atherosclerosis of the bilateral carotid bifurcations/bulbs, left greater than right, with less than 50% stenosis.       Patent vertebral arteries.       CT-CTA HEAD WITH & W/O-POST PROCESS   Final Result       No thrombosis is seen within the [place].       CT-HEAD W/O   Final Result       1.  No acute intracranial abnormality is identified.   2.  There are periventricular and subcortical white matter changes present.  This finding is nonspecific and could be from previous small vessel ischemia, demyelination, or gliosis.   3.  Focal hypodensities in the basal ganglia bilaterally, daysi and periventricular white matter on the left likely represent old lacunar infarcts.   4.  Paranasal sinus disease as above described.                     Assessment/Plan  * Neurological deficit present- (present on admission)  Assessment & Plan  -Patient coming in for left-sided arm and leg weakness  -Symptoms similar to an episode he had 6 months ago that was deemed to TIA  -Still having mildly diminished strength in his left upper extremity  -CT imaging of head and neck in ER largely unremarkable  -Continue home aspirin 81 mg daily  -Continue home atorvastatin 80 mg daily  -Continue home clopidogrel 75 mg daily  Follow-up [id number] genetic testing     Anemia  Assessment & Plan  No signs of acute blood loss  monitor     TIA (transient ischemic attack)- (present  "on admission)  Assessment & Plan  -Neurology saw patient upon arrival to ED, recommended MRI brain and C-spine for evaluation of possible cord compression/for visualization/characterization of old infarct(s). Also recommends holding off with further cardiac monitoring.   -Continue with DAPT, Statin at current dosages for stroke optimization.      Type 2 diabetes mellitus with other specified complication (HCC)- (present on admission)  Assessment & Plan  -Takes glipizide and metformin at home for his diabetes  -Most recent Hgb A1c in Nov 2021 was 8.4%  -Sliding scale insulin  -Resume metformin        VTE prophylaxis: therapeutic anticoagulation with Plavix        Functional Status/ Changes:     Nirali Andres, PT   Physical Therapist   Specialty:  Physical Therapy   Therapy      Signed   Date of Service:  2/22/2022 12:31 PM                         []Hide copied text    []Donellver for details    Physical Therapy   Daily Treatment     Patient Name: Israel Aviles  Age:  56 y.o., Sex:  male  Medical Record #: [id number]  Today's Date: 2/22/2022     Precautions  Precautions: (P) Fall Risk  Comments: (P) L genie, improving since admission     Assessment     Pt tolerates treatment session well, no pain during session, no significant LOB. Pt demonstrates improving functional mobility; requires assistance to don prosthetic and sleeve, min/mod assist for sit to stand from various surfaces, ambulates with min/mod assist and FWW with L platform. Pt demonstrates excellent awareness of functional deficits surrounding L LE and L UE weakness, compensates well.      Plan     Continue current treatment plan.     DC Equipment Recommendations: (P) Unable to determine at this time  Discharge Recommendations: (P) Recommend post-acute placement for additional physical therapy services prior to discharge home        Subjective     \"I have to be careful because this left side is so weak.\"              02/22/22 1231   Precautions "   Precautions Fall Risk   Comments L genie, improving since admission   Pain 0 - 10 Group   Therapist Pain Assessment 0;Post Activity Pain Same as Prior to Activity   Cognition    Level of Consciousness Alert   Comments cooperative, motivated   Strength Lower Body   Lower Body Strength  X   Comments R LE WFL, L hip flexion/abd/add 3+/5   Balance   Sitting Balance (Static) Fair   Sitting Balance (Dynamic) Fair -   Standing Balance (Static) Fair -   Standing Balance (Dynamic) Trace +   Weight Shift Sitting Fair   Weight Shift Standing Poor   Skilled Intervention Verbal Cuing;Tactile Cuing   Gait Analysis   Gait Level Of Assist Minimal Assist   Assistive Device Front Wheel Walker  (with L platform)   Distance (Feet) 25   # of Times Distance was Traveled 2   Deviation Decreased Toe Off;Decreased Heel Strike;Step To;Ataxic   Weight Bearing Status no restrictions   Skilled Intervention Verbal Cuing;Tactile Cuing   Bed Mobility    Supine to Sit Moderate Assist   Scooting Minimal Assist   Skilled Intervention Verbal Cuing   Functional Mobility   Sit to Stand Minimal Assist   Bed, Chair, Wheelchair Transfer Moderate Assist   Toilet Transfers Moderate Assist   Transfer Method Stand Step   Mobility bed mobility, STS from EOB, Stand step to transfer chair, transfer chair to/from toilet   Skilled Intervention Verbal Cuing   Comments with FWW and platform L   How much difficulty does the patient currently have...   Turning over in bed (including adjusting bedclothes, sheets and blankets)? 3   Sitting down on and standing up from a chair with arms (e.g., wheelchair, bedside commode, etc.) 2   Moving from lying on back to sitting on the side of the bed? 3   How much help from another person does the patient currently need...   Moving to and from a bed to a chair (including a wheelchair)? 2   Need to walk in a hospital room? 2   Climbing 3-5 steps with a railing? 1   6 clicks Mobility Score 13   Activity Tolerance   Sitting in Chair  10 min + left sitting in chair   Sitting Edge of Bed 5 min   Standing 5 min total   Short Term Goals    Short Term Goal # 1 Pt will perform supine <> sit with SPV in 6 visits to get in/out of bed   Goal Outcome # 1 Progressing as expected   Short Term Goal # 2 Pt will perform functional transfers with SPV in 6 visits to increase independence   Goal Outcome # 2 Progressing as expected   Short Term Goal # 3 Pt will ambulate 50ft with FWW and min assist in 6 visits to increase independence   Goal Outcome # 3 Progressing as expected   Education Group   Education Provided Role of Physical Therapist;Gait Training;Transfer Status   Role of Physical Therapist Patient Response Patient;Acceptance;Explanation;Verbal Demonstration   Gait Training Patient Response Eager;Explanation;Verbal Demonstration;Action Demonstration;Patient   Transfer Status Patient Response Patient;Acceptance;Explanation;Verbal Demonstration;Action Demonstration   Anticipated Discharge Equipment and Recommendations   DC Equipment Recommendations Unable to determine at this time   Discharge Recommendations Recommend post-acute placement for additional physical therapy services prior to discharge home            Aleksander Flaherty, OTD, OTR/L   Occupational Therapist      Therapy      Signed   Date of Service:  2/22/2022  9:54 AM                         []Kim copied text    []Marciano for details    Occupational Therapy  Daily Treatment     Patient Name: Israel Aviles  Age:  56 y.o., Sex:  male  Medical Record #: [id number]  Today's Date: 2/22/2022     Precautions  Precautions: (P) Fall Risk  Comments: (P) L genie     Assessment     Pt seen for follow up OT tx session, highly motivated to participate with improving LUE strength and coordination, provided a platform for patients FWW for shoulder stabilization/protection as well as better control of AD for mobility. Pt able to complete multiple transfers to wheelchair, ambulate in room and transfer to toilet  with less overall assistance than previous sessions, still significant assistance needed for ADLs but improving. Will continue to see for skilled therapy while admitted as well as recommend post-acute placement.     Plan     Continue current treatment plan.     DC Equipment Recommendations: Unable to determine at this time  Discharge Recommendations: Recommend post-acute placement for additional occupational therapy services prior to discharge home     Objective          02/22/22 0954   Precautions   Precautions Fall Risk   Comments L genie   Cognition    Level of Consciousness Alert   Comments Motivated to participate, cooperative   Strength Upper Body   Comments LUE improving more distally than proximal   Upper Body Muscle Tone   Lt Upper Extremity Muscle Tone Hypotonic   Other Treatments   Other Treatments Provided Added a left platform to patients FWW for shoulder stabilization/weightbearing and better control of AD for ambulation   Balance   Sitting Balance (Static) Fair   Sitting Balance (Dynamic) Fair -   Standing Balance (Static) Fair -   Standing Balance (Dynamic) Trace +   Weight Shift Sitting Fair   Weight Shift Standing Poor   Skilled Intervention Verbal Cuing   Bed Mobility    Scooting Contact Guard Assist   Skilled Intervention Verbal Cuing   Comments up seated at the EOB upon arrival   Activities of Daily Living   Grooming Supervision;Seated   Upper Body Dressing Minimal Assist   Lower Body Dressing Maximal Assist   Toileting Contact Guard Assist   Skilled Intervention Verbal Cuing   How much help from another person does the patient currently need...   Putting on and taking off regular lower body clothing? 2   Bathing (including washing, rinsing, and drying)? 2   Toileting, which includes using a toilet, bedpan, or urinal? 3   Putting on and taking off regular upper body clothing? 3   Taking care of personal grooming such as brushing teeth? 3   Eating meals? 3   6 Clicks Daily Activity Score 16    Functional Mobility   Sit to Stand Minimal Assist   Bed, Chair, Wheelchair Transfer Moderate Assist   Toilet Transfers Moderate Assist   Transfer Method Stand Step   Mobility STS from EOB, mobility in room x2, transfer to wheelchair, wheelchair to toilet, left in wheelchair   Skilled Intervention Verbal Cuing   Comments w/ platform FWW   Activity Tolerance   Sitting in Chair 10   Sitting Edge of Bed 5 min   Standing 5 min   Patient / Family Goals   Patient / Family Goal #1 regain independence   Goal #1 Outcome Progressing as expected   Short Term Goals   Short Term Goal # 1 pt will demo functional transfer with Camilla   Goal Outcome # 1 Progressing as expected   Short Term Goal # 2 pt will complete UB dress with SPV   Goal Outcome # 2 Progressing as expected   Education Group   Education Provided Role of Occupational Therapist;Joint Protection   Role of Occupational Therapist Patient Response Acceptance;Patient;Explanation   Joint Protection Patient Response Patient;Acceptance;Demonstration;Explanation;Action Demonstration   Interdisciplinary Plan of Care Collaboration   IDT Collaboration with  Nursing   Patient Position at End of Therapy Seated;Chair Alarm On;Call Light within Reach;Tray Table within Reach;Phone within Reach   Collaboration Comments RN updated            Reviewer: Randall Shoemaker L.P.N.  Date: 2022  Time: 10:09 AM  Pre-Admission Screening Form    Patient Information:   Name: Israel Aviles     MRN: [id number]       : 1965      Age: 56 y.o.   Gender: male      Race:  [1]       Marital Status:  [2]  Family Contact: Terrance Rehman Shuv        Relationship: Spouse [17]  Son [15]  Home Phone:   [phone]           Cell Phone: [phone]  Advanced Directives: None  Code Status:  FULL  Current Attending Provider: Benigno Martínez D.O.  Referring Physician: YAMILKA Herrmann  Physiatrist Consult: Dr. Kerns   Referral Date: 22  Primary Payor  Source:  [facility]  Secondary Payor Source:      Medical Information:   Date of Admission to Acute Care [place]2022  Room Number: S178/02  Rehabilitation Diagnosis: 0001.1 - Stroke: Left Body Involvement (Right Brain)  Immunization History   Administered Date(s) Administered   • Hep A/HEP B Combined Vaccine (TwinRix) 2021   • Hepatitis B Vaccine (Adol/Adult) 2008   • Influenza (IM) Preservative Free - HISTORICAL DATA 10/15/2010   • Influenza Vaccine H1N1 - HISTORICAL DATA 2010   • MMR Vaccine 2008   • Moderna SARS-CoV-2 Vaccine 2022   • Pfizer SARS-CoV-2 Vaccine 12+ 2021, 2021   • TD Vaccine 2008   • Varicella Vaccine Live 2008     No Known Allergies  Past Medical History:   Diagnosis Date   • CAD (coronary artery disease)    • Diabetes (HCC)    • Hyperlipidemia    • Hypertension      Past Surgical History:   Procedure Laterality Date   • KNEE AMPUTATION BELOW Left 2019    Procedure: AMPUTATION, BELOW KNEE;  Surgeon: Koby Zhao M.D.;  Location: SURGERY [facility];  Service: Orthopedics   • ZZZ CARDIAC CATH  2019    multi-vessel disease   • IRRIGATION & DEBRIDEMENT ORTHO Left 2019    Procedure: IRRIGATION AND DEBRIDEMENT, WOUND-FOOT, BIOLOGIC PLACEMENT, WOUND VAC PLACEMENT;  Surgeon: Charles Chopra M.D.;  Location: SURGERY [facility];  Service: Orthopedics       History Leading to Admission, Conditions that Caused the Need for Rehab (CMS):     Dr. Brownlee H&P:  HPI: Manan Aviles is a 56 y.o. male with a history of diabetes, HTN, PAD, class I stage C heart failure, multivessel CAD and history of CVA in  along with TIA in  admitted work-up of strokelike symptoms.     History given by patient and his wife.  Last night at 2 AM when he went to bed reportedly everything was normal.  This morning when they woke up he had some difficulty getting out of his wheelchair to go to the bathroom.  He noticed that he was having weakness  in his left leg and left arm.  Denies any difficulties finding words or any slurring of his speech, and his wife agrees with this.  They report that 6 months ago he had similar symptoms in his left side that were actually more severe, causing more weakness in his left arm.  He has been treated with aspirin and Plavix, as well as atorvastatin for his history of these things and he reports being compliant with these medications.     His vasculopathic history is quite significant.  He required a left below the knee amputation due to complications from his diabetes and PAD.  He has also been found to have multivessel CAD by the cardiologist.   ASSESSMENT/PLAN:   Manan Aviles is a 56 y.o. male with a history of diabetes, HTN, PAD, class I stage C heart failure, multivessel CAD and history of CVA in 2017 along with TIA in 2021 admitted work-up of strokelike symptoms.     Neurological deficit present  -Patient coming in for left-sided arm and leg weakness  -Symptoms similar to an episode he had 6 months ago that was deemed to TIA  -Seems somewhat improved from the onset of symptoms  -Still having mildly diminished strength in his left upper extremity  -CT imaging of head and neck in ER largely unremarkable  Plan:  -Brain and cervical spine MRI per neurology recommendations.  We will also order hemoglobin A1c and lipid profile per neurology recommendations.  -Continue home aspirin 81 mg daily  -Continue home atorvastatin 80 mg daily  -Continue home clopidogrel 75 mg daily     Uncontrolled type 2 diabetes mellitus with hyperglycemia (HCC)  -Takes glipizide and metformin at home for his diabetes  -Most recent Hgb A1c in Nov 2021 was 8.4%  Plan:  -Sliding scale insulin  -Hold home metformin for now in setting of receiving many CT scans  -Hypoglycemic protocol        #Dispo  -Admitted under observation for work-up of arm weakness     #Core Measures   VTE Ppx: Lovenox  Abx: None  Diet: Consistent carb  Fluids: None  Lines and  Tube: PUV  Code Status: Full    Dr. Woo (Neurology) recommendations:  Assessment and Plan:  Israel Aviles is a 56 year old man with multiple vascular risk factors, presenting with acute onset L side weakness.  He is outside standard therapeutic time window for IV-thrombolytics, and stroke protocol CT without evidence of a large evolving stroke, or large vessel occlusion amenable to endovascular clot retrieval.  He is already relatively well stroke optimized for his stroke prevention- on DAPT given his CAD/PAD history, high intensity statin, and DM and BP management.  Similar presentation in 2021 without new infarct on MRI.  Possibly stroke recrudescence, but recommend MRI brain and C-spine to rule out new ischemia or C-spine compressive pathology to explain clinical presentation.      Problem list:  1.  L side weakness  2.  Hypertension  3.  CAD  4.  PAD  5.  Hyperlipidemia  6.  Type 2 diabetes     Recommendations:              - q4h neurochecks ok              - no need for permissive HTN in the absence of an ischemic penumbra, may restart home anti-HTN, long-term BP goal is 110-130/60-80              - continue ASA 81mg daily, plavix 75mg daily              - stroke labs:  HgbA1c and lipid panel              - continue home atorvastatin for LDL goal < 70              - DM management for goal HgbA1c < 7              - PT/OT/SLP              - MRI brain without contrast, MRI C-spine without contrast              - ok to defer TTE and long-term cardiac monitoring unless MRI reveals embolic appearing infarcts              - stroke bridge clinic in 4-6 weeks     Dr. Kerns (Physiatry) recommendations:  ASSESSMENT:  Patient is a 56 y.o. male admitted with left sided weakness and sensory changes, found to have right PLIC acute infarct.       [facility] Code / Diagnosis to Support: 0001.1 - Stroke: Left Body Involvement (Right Brain)     Rehabilitation: Impaired ADLs and mobility  Patient is a good candidate for  inpatient rehab based on needs for PT, OT.  Patient will also benefit from family training.  Patient has a good discharge situation which will be home with spouse and son.      Barriers to transfer include: Insurance authorization, TCCs to verify disposition, medical clearance and bed availability      All cases are subject to administrative review and recommendations may change     Additional Recommendations:  -Good candidate for IPR, awaiting insurance authorization.  Unable to submit today as it is Presidents' Day and the authorizing office is closed.  -We will need updated PT OT notes as these evaluations will  by tomorrow  - Continue aspirin, statin, Plavix per neurology recommendations  - Tight control of diabetic blood glucose levels  - Tight control of blood pressure under 140/90  -Continue gabapentin 300 mg TID for neuropathic pain. High risk medication, Labs reviewed, CrCl 111 GFR >60  -PMR to follow in the periphery for rehab appropriateness, please reach out with questions or request for medical management        Medical Complexity:  HTN   Stroke   BKA   DM2     DVT PPX: SCDs    Brain MRI 22:  Acute infarct in the right posterior limb internal capsule and adjacent basal ganglia.     Multiple remote lacunar infarcts involving the bilateral cerebral hemispheric deep white matter. These are more numerous than on the previous study from 2018.     Interval progression of chronic deep white matter microvascular ischemic changes.     Gradient echo hypointense lesions along the medial right temporal cortex involving the hippocampus and the medial right parietal region may represent focal microhemorrhages related to hypertension.    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 (Former[facility]) 11/23/2021   • Colon cancer screening declined 11/23/2021   • TIA (transient ischemic attack) 07/05/2021   • Type 2 diabetes mellitus with other specified complication ([facility]) 06/18/2020   • Dyslipidemia due to type 2 diabetes mellitus ([facility]) 06/18/2020   • HFrEF (heart failure with reduced ejection fraction) ([facility]) 05/13/2020   • Ischemic cardiomyopathy 05/13/2020   • Below-knee amputation ([facility]) 01/17/2020   • Adjustment disorder with depressed mood 01/17/2020   • Non-healing wound of right heel 01/07/2020   • Orthostatic hypotension 01/06/2020   • CAD (coronary artery disease) 12/28/2019   • Body mass index (BMI) 23.0-23.9, adult 09/13/2019   • Osteomyelitis of left foot ([facility]) 08/21/2019   • Diabetic foot ([facility]) 08/21/2019   • Pressure injury of deep tissue of left foot 07/30/2019   • Diabetic ulcer of toe of left foot associated with type 2 diabetes mellitus, with necrosis of muscle ([facility]) 07/30/2019   • Diabetic foot ulcer ([facility]) 07/07/2019   • Diabetic polyneuropathy associated with type 2 diabetes mellitus ([facility]) 07/07/2019   • PVD (peripheral vascular disease) ([facility]) 06/21/2019   • Wound infection 05/24/2019   • Uncontrolled type 2 diabetes mellitus with hyperglycemia ([facility]) 05/14/2019   • Hyperlipidemia 05/14/2019   • Acute ischemic stroke ([facility]) 06/15/2018     Alert with periods of forgetfulness.    Current Vital Signs:   Temperature: 36.4 °C (97.6 °F) Pulse: 85 Respiration: 18 Blood Pressure: 131/84  Weight: 74.3 kg (163 lb 12.8 oz) Height: 182.9 cm (6')  Pulse Oximetry: 96 % O2 (LPM): 0      Completed Laboratory Reports:  Recent Labs     02/19/22  1241 02/19/22  1827 02/19/22  2025 02/20/22  0224 02/20/22  0904 02/20/22  1305 02/20/22  1704  02/20/22 2120 02/21/22  0517 02/21/22  0843 02/21/22  1137 02/21/22  1648 02/21/22 2036 02/22/22  0840   WBC  --   --   --  4.6*  --   --   --   --  5.5  --   --   --   --   --    HEMOGLOBIN  --   --   --  13.4*  --   --   --   --  13.5*  --   --   --   --   --    HEMATOCRIT  --   --   --  40.1*  --   --   --   --  39.6*  --   --   --   --   --    PLATELETCT  --   --   --  177  --   --   --   --  171  --   --   --   --   --    SODIUM  --   --   --  139  --   --   --   --  139  --   --   --   --   --    POTASSIUM  --   --   --  4.0  --   --   --   --  4.0  --   --   --   --   --    BUN  --   --   --  19  --   --   --   --  18  --   --   --   --   --    CREATININE  --   --   --  0.78  --   --   --   --  0.78  --   --   --   --   --    GLUCOSE  --   --   --  186*  --   --   --   --  164*  --   --   --   --   --    POCGLUCOSE 238* 201* 332*  --  202* 208* 189* 264*  --  181* 196* 316* 238* 156*     Additional Labs: Not Applicable    Prior Living Situation:   Housing / Facility: 1 Story House  Steps Into Home: 0  Lives with - Patient's Self Care Capacity: Spouse  Equipment Owned: None    Prior Level of Function / Living Situation:   Physical Therapy: Prior Services: Home-Independent  Housing / Facility: 1 Story House  Steps Into Home: 0  Bathroom Set up: Walk In Shower,Shower Chair  Equipment Owned: None  Lives with - Patient's Self Care Capacity: Spouse  Bed Mobility: Independent  Transfer Status: Independent  Ambulation: Independent  Distance Ambulation (Feet):  (community ambulator)  Assistive Devices Used: None  Current Level of Function:   Gait Level Of Assist: Unable to Participate  Assistive Device: Front Wheel Walker  Weight Bearing Status: no restrictions  Supine to Sit: Moderate Assist  Sit to Supine:  (left up in chair)  Scooting: Minimal Assist  Rolling: Minimum Assist to Lt.  Skilled Intervention: Verbal Cuing  Sit to Stand: Minimal Assist  Bed, Chair, Wheelchair Transfer: Moderate Assist  Toilet  Transfers: Moderate Assist  Transfer Method: Stand Pivot  Skilled Intervention: Verbal Cuing  Sitting in Chair: 10min  Sitting Edge of Bed: 10min  Standing: 3minx2  Occupational Therapy:   Self Feeding: Independent  Grooming / Hygiene: Independent  Bathing: Independent  Dressing: Independent  Toileting: Independent  Medication Management: Independent  Laundry: Independent  Kitchen Mobility: Independent  Finances: Independent  Home Management: Independent  Shopping: Independent  Prior Level Of Mobility: Independent Without Device in Community  Prior Services: Home-Independent  Housing / Facility: [address] [facility] [address] [place]  Current Level of Function:   Upper Body Dressing: Moderate Assist  Lower Body Dressing: Maximal Assist  Toileting: Minimal Assist  Skilled Intervention: Verbal Cuing  Speech Language Pathology:      Rehabilitation Prognosis/Potential: Good  Estimated Length of Stay: 14-21 days    Nursing:      Continent    Scope/Intensity of Services Recommended:  Physical Therapy: 1 hr / day  5 days / week. Therapeutic Interventions Required: Maximize Endurance, Mobility, Strength and Safety  Occupational Therapy: 1 hr / day 5 days / week. Therapeutic Interventions Required: Maximize Self Care, ADLs, IADLs and Energy Conservation  Speech & Language Pathology: 1 hr / day 5 days / week. Therapeutic Interventions Required: Maximize Cognition and Safety  Rehabilitation Nursin/7. Therapeutic Interventions Required: Monitor Pain, Skin, Vital Signs, Intake and Output, Labs, Safety and Family Training  Rehabilitation Physician: 3 - 5 days / week. Therapeutic Interventions Required: Medical Management    He requires 24-hour rehabilitation nursing to manage skin care, nutrition and fluid intake, pain control, safety, medication management and patient/family goals. In addition, rehabilitation nursing will reiterate and reinforce therapy skills and equipment use, including ADLs, as well as provide education to the patient and  family. Israel Aviles is willing to participate in and is able to tolerate the proposed plan of care.    Rehabilitation Goals and Plan (Expected frequency & duration of treatment in the IRF):   Return to the Community, Modified Independent Level of Care and Outpatient Support  Anticipated Date of Rehabilitation Admission: 02-22-22  Patient/Family oriented IRF level of care/facility/plan: Yes  Patient/Family willing to participate in IRF care/facility/plan: Yes  Patient able to tolerate IRF level of care proposed: Yes  Patient has potential to benefit IRF level of care proposed: Yes  Comments: Not Applicable    Special Needs or Precautions - Medical Necessity:  Safety Concerns/Precautions:  Fall Risk / High Risk for Falls, Balance, Cognition and Bed / Chair Alarm  Pain Management  IV Site: Peripheral  Current Medications:    Current Facility-Administered Medications Ordered in Epic   Medication Dose Route Frequency Provider Last Rate Last Admin   • metFORMIN (GLUCOPHAGE) tablet 500 mg  500 mg Oral BID WITH MEALS Andi Cancino M.D.   500 mg at 02/22/22 0844   • gabapentin (NEURONTIN) capsule 300 mg  300 mg Oral Q EVENING Andi Cancino M.D.   300 mg at 02/21/22 2038   • senna-docusate (PERICOLACE or SENOKOT S) 8.6-50 MG per tablet 2 Tablet  2 Tablet Oral BID Jevon Brownlee M.D.   2 Tablet at 02/21/22 1655    And   • polyethylene glycol/lytes (MIRALAX) PACKET 1 Packet  1 Packet Oral QDAY PRN Jevon Brownlee M.D.        And   • magnesium hydroxide (MILK OF MAGNESIA) suspension 30 mL  30 mL Oral QDAY PRN Jevon Brownlee M.D.        And   • bisacodyl (DULCOLAX) suppository 10 mg  10 mg Rectal QDAY PRN Jevon Brownlee M.D.       • enoxaparin (LOVENOX) inj 40 mg  40 mg Subcutaneous DAILY Jevon Brownlee M.D.   40 mg at 02/22/22 0420   • labetalol (NORMODYNE/TRANDATE) injection 10 mg  10 mg Intravenous Q4HRS PRN Jevon Brownlee M.D.       • ondansetron (ZOFRAN) syringe/vial injection 4 mg  4 mg  Intravenous Q4HRS PRN Jevon Brownlee M.D.       • ondansetron (ZOFRAN ODT) dispertab 4 mg  4 mg Oral Q4HRS PRN Jevon Brownlee M.D.       • promethazine (PHENERGAN) tablet 12.5-25 mg  12.5-25 mg Oral Q4HRS PRN Jevon Brownlee M.D.       • promethazine (PHENERGAN) suppository 12.5-25 mg  12.5-25 mg Rectal Q4HRS PRN Jevon Brownlee M.D.       • prochlorperazine (COMPAZINE) injection 5-10 mg  5-10 mg Intravenous Q4HRS PRN Jevon Brownlee M.D.       • insulin regular (HumuLIN R,NovoLIN R) injection  2-9 Units Subcutaneous 4X/DAY ACHS Jevon Brownlee M.D.   2 Units at 02/22/22 0842    And   • dextrose 50% (D50W) injection 50 mL  50 mL Intravenous Q15 MIN PRN Jevon Brownlee M.D.       • atorvastatin (LIPITOR) tablet 80 mg  80 mg Oral DAILY Jevon Brownlee M.D.   80 mg at 02/22/22 0420   • clopidogrel (PLAVIX) tablet 75 mg  75 mg Oral DAILY Jevon Brownlee M.D.   75 mg at 02/22/22 0420   • [Held by provider] metoprolol SR (TOPROL XL) tablet 12.5 mg  12.5 mg Oral BID Jevon Brownlee M.D.   12.5 mg at 02/17/22 1728   • aspirin EC (ECOTRIN) tablet 81 mg  81 mg Oral DAILY Jevon Brownlee M.D.   81 mg at 02/22/22 0420     No current [facility]-ordered outpatient medications on file.     Diet:   DIET ORDERS (From admission to next 24h)     Start     Ordered    02/17/22 1427  Diet Order Diet: Consistent CHO (Diabetic); Miscellaneous modifications: (optional): Vegetarian  ALL MEALS        Question Answer Comment   Diet: Consistent CHO (Diabetic)    Miscellaneous modifications: (optional) Vegetarian        02/17/22 1426                Anticipated Discharge Destination / Patient/Family Goal:  Destination: Home with Assistance Support System: Spouse  Anticipated home health services: OT and PT  Previously used HH service/ provider: Not Applicable  Anticipated DME Needs: [employer]  Outpatient Services: OT and PT  Alternative resources to address additional identified needs:     Future Appointments   Date Time  Provider Department Center   7/26/2022  9:40 AM Mahesh Segovia M.D. [facility] None         Pre-Screen Completed: 2/22/2022 10:11 AM Randall Shoemaker L.P.N.

## 2022-02-22 NOTE — PROGRESS NOTES
[facility] Medicine Daily Progress Note    Date of Service  2/22/2022    Chief Complaint  Israel Aviles is a 56 y.o. male admitted 2/17/2022 with LEFT sided weakness.    Hospital Course  Mr. Manan Aviles is a 56-year-old male with a PMHx of HTN, HLD, DMT2, CAD, history of left frontal stroke in 2018, admitted on 2/17/2022 due to left sided deficits.    Patient reports waking up at 2 AM a noting weakness in his left arm.  EMS was activated and upon arrival at [facility], patient noted to have subtle left facial droop, left arm drift.  NIHSS score was at 2.  Patient reports he has been compliant with medication including ASA and Plavix therapy.  Stroke protocol was initiated and CT of the head did not reveal any acute hemorrhage, or large territory stroke, or critical stenosis, or large vessel occlusions.  CTA also noted no focal perfusion defect.  Patient reports that he has had a similar episode in 2021 where he was seen here at [facility] due to LUE weakness which eventually resolved.  MRI was obtained in 2021 which was negative for acute infarct at that time.  Upon examination, no infection prodrome, no constitutional symptoms, does not use recreational drugs, and denies any use of cigarettes.  Patient admitted into the observation unit for further evaluation.      Interval Problem Update  Patient was seen and examined at bedside.  No acute events overnight. Patient is resting comfortably in bed and in no acute distress.     IPR placement    I have personally seen and examined the patient at bedside. I discussed the plan of care with patient, family, bedside RN and .    Consultants/Specialty  neurology    Code Status  Full Code    Disposition  Patient is not medically cleared for discharge.   Anticipate discharge to to home with close outpatient follow-up.  I have placed the appropriate orders for post-discharge needs.    Review of Systems  Review of Systems   Constitutional: Negative for chills and  fever.   Eyes: Negative for blurred vision and double vision.   Respiratory: Negative for cough and shortness of breath.    Cardiovascular: Negative for chest pain.   Gastrointestinal: Negative for abdominal pain, nausea and vomiting.   Genitourinary: Negative for dysuria and frequency.   Neurological: Positive for weakness. Negative for loss of consciousness.      Physical Exam  Temp:  [36.1 °C (97 °F)-36.6 °C (97.8 °F)] 36.4 °C (97.6 °F)  Pulse:  [84-95] 85  Resp:  [16-18] 18  BP: (129-136)/(84-86) 131/84  SpO2:  [95 %-99 %] 96 %    Physical Exam  Vitals and nursing note reviewed.   Constitutional:       General: He is not in acute distress.     Appearance: He is not diaphoretic.   HENT:      Head: Normocephalic and atraumatic.      Right Ear: External ear normal.      Left Ear: External ear normal.      Nose: No congestion.      Mouth/Throat:      Mouth: Mucous membranes are moist.   Eyes:      General: No scleral icterus.        Right eye: No discharge.         Left eye: No discharge.      Extraocular Movements: Extraocular movements intact.      Conjunctiva/sclera: Conjunctivae normal.   Cardiovascular:      Rate and Rhythm: Normal rate.      Pulses: Normal pulses.      Heart sounds: No murmur heard.  Pulmonary:      Effort: Pulmonary effort is normal.      Breath sounds: No wheezing.   Abdominal:      General: Abdomen is flat.      Palpations: Abdomen is soft.      Tenderness: There is no abdominal tenderness. There is no guarding or rebound.   Musculoskeletal:         General: No swelling or deformity. Normal range of motion.      Cervical back: Normal range of motion. No rigidity.   Skin:     General: Skin is warm and dry.      Coloration: Skin is not jaundiced.      Findings: No bruising.   Neurological:      Mental Status: He is alert.      Motor: Weakness present.   Psychiatric:         Mood and Affect: Mood normal.         Thought Content: Thought content normal.         Fluids    Intake/Output Summary  (Last 24 hours) at 2/22/2022 1401  Last data filed at 2/22/2022 0800  Gross per 24 hour   Intake 60 ml   Output 1000 ml   Net -940 ml       Laboratory  Recent Labs     02/20/22 0224 02/21/22  0517   WBC 4.6* 5.5   RBC 4.40* 4.34*   HEMOGLOBIN 13.4* 13.5*   HEMATOCRIT 40.1* 39.6*   MCV 91.1 91.2   MCH 30.5 31.1   MCHC 33.4* 34.1   RDW 43.1 43.0   PLATELETCT 177 171   MPV 10.0 10.2     Recent Labs     02/20/22 0224 02/21/22  0517   SODIUM 139 139   POTASSIUM 4.0 4.0   CHLORIDE 104 102   CO2 24 26   GLUCOSE 186* 164*   BUN 19 18   CREATININE 0.78 0.78   CALCIUM 9.3 9.2                   Imaging  EC-ECHOCARDIOGRAM COMPLETE W/O CONT   Final Result      MR-CERVICAL SPINE-W/O   Final Result         Mild cervical spine degenerative changes without significant spinal canal stenosis or foraminal narrowing.      Remote right pontine lacunar infarct noted.      MR-BRAIN-W/O   Final Result         Acute infarct in the right posterior limb internal capsule and adjacent basal ganglia.      Multiple remote lacunar infarcts involving the bilateral cerebral hemispheric deep white matter. These are more numerous than on the previous study from 2018.      Interval progression of chronic deep white matter microvascular ischemic changes.      Gradient echo hypointense lesions along the medial right temporal cortex involving the hippocampus and the medial right parietal region may represent focal microhemorrhages related to hypertension.      DX-CHEST-PORTABLE (1 VIEW)   Final Result      No acute cardiopulmonary abnormality.      CT-CEREBRAL PERFUSION ANALYSIS   Final Result      1.  Cerebral blood flow less than 30% likely representing completed infarct = 0 mL.      2.  T Max more than 6 seconds likely representing combination of completed infarct and ischemia = 0 mL.      3.  Mismatched volume likely representing ischemic brain/penumbra = None      4.  Please note that the cerebral perfusion was performed on the limited brain tissue  around the basal ganglia region. Infarct/ischemia outside the CT perfusion sections can be missed in this study.      CT-CTA NECK WITH & W/O-POST PROCESSING   Final Result      Atherosclerosis of the bilateral carotid bifurcations/bulbs, left greater than right, with less than 50% stenosis.      Patent vertebral arteries.      CT-CTA HEAD WITH & W/O-POST PROCESS   Final Result      No thrombosis is seen within the [place].      CT-HEAD W/O   Final Result      1.  No acute intracranial abnormality is identified.   2.  There are periventricular and subcortical white matter changes present.  This finding is nonspecific and could be from previous small vessel ischemia, demyelination, or gliosis.   3.  Focal hypodensities in the basal ganglia bilaterally, daysi and periventricular white matter on the left likely represent old lacunar infarcts.   4.  Paranasal sinus disease as above described.                 Assessment/Plan  * Neurological deficit present- (present on admission)  Assessment & Plan  -Patient coming in for left-sided arm and leg weakness  -Symptoms similar to an episode he had 6 months ago that was deemed to TIA  -Still having mildly diminished strength in his left upper extremity  -CT imaging of head and neck in ER largely unremarkable  -Continue home aspirin 81 mg daily  -Continue home atorvastatin 80 mg daily  -Continue home clopidogrel 75 mg daily  Follow-up [id number] genetic testing    Anemia  Assessment & Plan  No signs of acute blood loss  monitor    TIA (transient ischemic attack)- (present on admission)  Assessment & Plan  -Neurology saw patient upon arrival to ED, recommended MRI brain and C-spine for evaluation of possible cord compression/for visualization/characterization of old infarct(s). Also recommends holding off with further cardiac monitoring.   -Continue with DAPT, Statin at current dosages for stroke optimization.     Type 2 diabetes mellitus with other specified complication  ([facility])- (present on admission)  Assessment & Plan  -Takes glipizide and metformin at home for his diabetes  -Most recent Hgb A1c in Nov 2021 was 8.4%  -Sliding scale insulin  -Resume metformin       VTE prophylaxis: therapeutic anticoagulation with Plavix    I have performed a physical exam and reviewed and updated ROS and Plan today (2/22/2022). In review of yesterday's note (2/21/2022), there are no changes except as documented above.

## 2022-02-22 NOTE — THERAPY
"Physical Therapy   Daily Treatment     Patient Name: Israel Aviles  Age:  56 y.o., Sex:  male  Medical Record #: [id number]  Today's Date: 2/22/2022     Precautions  Precautions: (P) Fall Risk  Comments: (P) L genie, improving since admission    Assessment    Pt tolerates treatment session well, no pain during session, no significant LOB. Pt demonstrates improving functional mobility; requires assistance to don prosthetic and sleeve, min/mod assist for sit to stand from various surfaces, ambulates with min/mod assist and FWW with L platform. Pt demonstrates excellent awareness of functional deficits surrounding L LE and L UE weakness, compensates well.     Plan    Continue current treatment plan.    DC Equipment Recommendations: (P) Unable to determine at this time  Discharge Recommendations: (P) Recommend post-acute placement for additional physical therapy services prior to discharge home      Subjective    \"I have to be careful because this left side is so weak.\"          02/22/22 1231   Precautions   Precautions Fall Risk   Comments L genie, improving since admission   Pain 0 - 10 Group   Therapist Pain Assessment 0;Post Activity Pain Same as Prior to Activity   Cognition    Level of Consciousness Alert   Comments cooperative, motivated   Strength Lower Body   Lower Body Strength  X   Comments R LE WFL, L hip flexion/abd/add 3+/5   Balance   Sitting Balance (Static) Fair   Sitting Balance (Dynamic) Fair -   Standing Balance (Static) Fair -   Standing Balance (Dynamic) Trace +   Weight Shift Sitting Fair   Weight Shift Standing Poor   Skilled Intervention Verbal Cuing;Tactile Cuing   Gait Analysis   Gait Level Of Assist Minimal Assist   Assistive Device Front Wheel Walker  (with L platform)   Distance (Feet) 25   # of Times Distance was Traveled 2   Deviation Decreased Toe Off;Decreased Heel Strike;Step To;Ataxic   Weight Bearing Status no restrictions   Skilled Intervention Verbal Cuing;Tactile Cuing   Bed " Mobility    Supine to Sit Moderate Assist   Scooting Minimal Assist   Skilled Intervention Verbal Cuing   Functional Mobility   Sit to Stand Minimal Assist   Bed, Chair, Wheelchair Transfer Moderate Assist   Toilet Transfers Moderate Assist   Transfer Method Stand Step   Mobility bed mobility, STS from EOB, Stand step to transfer chair, transfer chair to/from toilet   Skilled Intervention Verbal Cuing   Comments with FWW and platform L   How much difficulty does the patient currently have...   Turning over in bed (including adjusting bedclothes, sheets and blankets)? 3   Sitting down on and standing up from a chair with arms (e.g., wheelchair, bedside commode, etc.) 2   Moving from lying on back to sitting on the side of the bed? 3   How much help from another person does the patient currently need...   Moving to and from a bed to a chair (including a wheelchair)? 2   Need to walk in a hospital room? 2   Climbing 3-5 steps with a railing? 1   6 clicks Mobility Score 13   Activity Tolerance   Sitting in Chair 10 min + left sitting in chair   Sitting Edge of Bed 5 min   Standing 5 min total   Short Term Goals    Short Term Goal # 1 Pt will perform supine <> sit with SPV in 6 visits to get in/out of bed   Goal Outcome # 1 Progressing as expected   Short Term Goal # 2 Pt will perform functional transfers with SPV in 6 visits to increase independence   Goal Outcome # 2 Progressing as expected   Short Term Goal # 3 Pt will ambulate 50ft with FWW and min assist in 6 visits to increase independence   Goal Outcome # 3 Progressing as expected   Education Group   Education Provided Role of Physical Therapist;Gait Training;Transfer Status   Role of Physical Therapist Patient Response Patient;Acceptance;Explanation;Verbal Demonstration   Gait Training Patient Response Eager;Explanation;Verbal Demonstration;Action Demonstration;Patient   Transfer Status Patient Response Patient;Acceptance;Explanation;Verbal Demonstration;Action  Demonstration   Anticipated Discharge Equipment and Recommendations   DC Equipment Recommendations Unable to determine at this time   Discharge Recommendations Recommend post-acute placement for additional physical therapy services prior to discharge home       Nirali Andres DPT

## 2022-02-22 NOTE — THERAPY
Occupational Therapy  Daily Treatment     Patient Name: Israel Aviles  Age:  56 y.o., Sex:  male  Medical Record #: [id number]  Today's Date: 2/21/2022       Precautions: Fall Risk  Comments: L genie    Assessment    Pt seen for OT tx. Continues to be limited by decreased activity tolerance, balance deficits, inattention and LUE weakness impacting ability to complete ADLs and ADL transfers independently. Tolerated LUE exercises while seated EOB, improved ROM and strength. Proximally weaker than distally. Encouraged pt to continue mobilizing w/ nrsg. Will continue to benefit from OT services while in house.     Plan    Continue current treatment plan.    DC Equipment Recommendations: Unable to determine at this time  Discharge Recommendations: Recommend post-acute placement for additional occupational therapy services prior to discharge home       02/21/22 1445   Active ROM Upper Body   Active ROM Upper Body  X   Comments LUE limited proximally d/t weakness   Strength Upper Body   Upper Body Strength  X   Comments improving strength, attempting to use during ADLs   Balance   Sitting Balance (Static) Fair   Sitting Balance (Dynamic) Fair -   Standing Balance (Static) Fair -   Standing Balance (Dynamic) Trace +   Weight Shift Sitting Poor   Weight Shift Standing Absent   Bed Mobility    Supine to Sit Moderate Assist   Sit to Supine   (left up in chair)   Scooting Minimal Assist   Activities of Daily Living   Grooming Minimal Assist;Seated   Upper Body Dressing Moderate Assist   Lower Body Dressing Maximal Assist   Toileting Minimal Assist   Functional Mobility   Sit to Stand Minimal Assist   Bed, Chair, Wheelchair Transfer Moderate Assist   Toilet Transfers Moderate Assist   Short Term Goals   Short Term Goal # 1 pt will demo functional transfer with Camilla   Goal Outcome # 1 Progressing as expected   Short Term Goal # 2 pt will complete UB dress with SPV   Goal Outcome # 2 Goal not met   Anticipated Discharge  Equipment and Recommendations   DC Equipment Recommendations Unable to determine at this time   Discharge Recommendations Recommend post-acute placement for additional occupational therapy services prior to discharge home

## 2022-02-23 ENCOUNTER — PATIENT OUTREACH (OUTPATIENT)
Dept: HEALTH INFORMATION MANAGEMENT | Facility: OTHER | Age: 57
End: 2022-02-23
Payer: COMMERCIAL

## 2022-02-23 LAB
GLUCOSE BLD STRIP.AUTO-MCNC: 177 MG/DL (ref 65–99)
GLUCOSE BLD STRIP.AUTO-MCNC: 181 MG/DL (ref 65–99)
GLUCOSE BLD STRIP.AUTO-MCNC: 226 MG/DL (ref 65–99)
GLUCOSE BLD STRIP.AUTO-MCNC: 232 MG/DL (ref 65–99)

## 2022-02-23 PROCEDURE — 700102 HCHG RX REV CODE 250 W/ 637 OVERRIDE(OP): Performed by: STUDENT IN AN ORGANIZED HEALTH CARE EDUCATION/TRAINING PROGRAM

## 2022-02-23 PROCEDURE — 82962 GLUCOSE BLOOD TEST: CPT

## 2022-02-23 PROCEDURE — 700102 HCHG RX REV CODE 250 W/ 637 OVERRIDE(OP): Performed by: HOSPITALIST

## 2022-02-23 PROCEDURE — 99232 SBSQ HOSP IP/OBS MODERATE 35: CPT | Performed by: INTERNAL MEDICINE

## 2022-02-23 PROCEDURE — A9270 NON-COVERED ITEM OR SERVICE: HCPCS | Performed by: HOSPITALIST

## 2022-02-23 PROCEDURE — 700111 HCHG RX REV CODE 636 W/ 250 OVERRIDE (IP): Performed by: STUDENT IN AN ORGANIZED HEALTH CARE EDUCATION/TRAINING PROGRAM

## 2022-02-23 PROCEDURE — A9270 NON-COVERED ITEM OR SERVICE: HCPCS | Performed by: STUDENT IN AN ORGANIZED HEALTH CARE EDUCATION/TRAINING PROGRAM

## 2022-02-23 PROCEDURE — 770001 HCHG ROOM/CARE - MED/SURG/GYN PRIV*

## 2022-02-23 RX ORDER — GABAPENTIN 300 MG/1
300 CAPSULE ORAL EVERY EVENING
Qty: 90 CAPSULE | Status: ON HOLD
Start: 2022-02-23 | End: 2022-03-11 | Stop reason: SDUPTHER

## 2022-02-23 RX ADMIN — INSULIN HUMAN 2 UNITS: 100 INJECTION, SOLUTION PARENTERAL at 12:15

## 2022-02-23 RX ADMIN — INSULIN HUMAN 3 UNITS: 100 INJECTION, SOLUTION PARENTERAL at 17:01

## 2022-02-23 RX ADMIN — ATORVASTATIN CALCIUM 80 MG: 80 TABLET, FILM COATED ORAL at 04:57

## 2022-02-23 RX ADMIN — CLOPIDOGREL BISULFATE 75 MG: 75 TABLET ORAL at 04:57

## 2022-02-23 RX ADMIN — ENOXAPARIN SODIUM 40 MG: 40 INJECTION SUBCUTANEOUS at 04:57

## 2022-02-23 RX ADMIN — INSULIN HUMAN 3 UNITS: 100 INJECTION, SOLUTION PARENTERAL at 21:03

## 2022-02-23 RX ADMIN — ASPIRIN 81 MG: 81 TABLET, COATED ORAL at 04:57

## 2022-02-23 RX ADMIN — METFORMIN HYDROCHLORIDE 500 MG: 500 TABLET ORAL at 16:56

## 2022-02-23 RX ADMIN — GABAPENTIN 300 MG: 300 CAPSULE ORAL at 21:03

## 2022-02-23 RX ADMIN — INSULIN HUMAN 2 UNITS: 100 INJECTION, SOLUTION PARENTERAL at 08:53

## 2022-02-23 RX ADMIN — METFORMIN HYDROCHLORIDE 500 MG: 500 TABLET ORAL at 08:46

## 2022-02-23 ASSESSMENT — PAIN DESCRIPTION - PAIN TYPE
TYPE: ACUTE PAIN
TYPE: ACUTE PAIN

## 2022-02-23 ASSESSMENT — ENCOUNTER SYMPTOMS
WEAKNESS: 1
NAUSEA: 0
BLURRED VISION: 0
SHORTNESS OF BREATH: 0
LOSS OF CONSCIOUSNESS: 0
CHILLS: 0
VOMITING: 0
FEVER: 0
ABDOMINAL PAIN: 0
DOUBLE VISION: 0
COUGH: 0

## 2022-02-23 NOTE — PROGRESS NOTES
Hospital Medicine Daily Progress Note    Date of Service  2/23/2022    Chief Complaint  Israel Aviles is a 56 y.o. male admitted 2/17/2022 with LEFT sided weakness.    Hospital Course  Mr. Manan Aviles is a 56-year-old male with a PMHx of HTN, HLD, DMT2, CAD, history of left frontal stroke in 2018, admitted on 2/17/2022 due to left sided deficits.    Patient reports waking up at 2 AM a noting weakness in his left arm.  EMS was activated and upon arrival at [facility], patient noted to have subtle left facial droop, left arm drift.  NIHSS score was at 2.  Patient reports he has been compliant with medication including ASA and Plavix therapy.  Stroke protocol was initiated and CT of the head did not reveal any acute hemorrhage, or large territory stroke, or critical stenosis, or large vessel occlusions.  CTA also noted no focal perfusion defect.  Patient reports that he has had a similar episode in 2021 where he was seen here at [facility] due to LUE weakness which eventually resolved.  MRI was obtained in 2021 which was negative for acute infarct at that time.  Upon examination, no infection prodrome, no constitutional symptoms, does not use recreational drugs, and denies any use of cigarettes.  Patient admitted into the observation unit for further evaluation.      Interval Problem Update  Patient was seen and examined at bedside.  No acute events overnight. Patient is resting comfortably in bed and in no acute distress.     IPR placement  Anticipate discharge in AM    I have personally seen and examined the patient at bedside. I discussed the plan of care with patient, family, bedside RN and .    Consultants/Specialty  neurology    Code Status  Full Code    Disposition  Patient is not medically cleared for discharge.   Anticipate discharge to to home with close outpatient follow-up.  I have placed the appropriate orders for post-discharge needs.    Review of Systems  Review of Systems   Constitutional:  Negative for chills and fever.   Eyes: Negative for blurred vision and double vision.   Respiratory: Negative for cough and shortness of breath.    Cardiovascular: Negative for chest pain.   Gastrointestinal: Negative for abdominal pain, nausea and vomiting.   Genitourinary: Negative for dysuria and frequency.   Neurological: Positive for weakness. Negative for loss of consciousness.      Physical Exam  Temp:  [36 °C (96.8 °F)-36.7 °C (98 °F)] 36.2 °C (97.2 °F)  Pulse:  [78-93] 80  Resp:  [17-18] 17  BP: (107-141)/(60-89) 141/89  SpO2:  [97 %-100 %] 98 %    Physical Exam  Vitals and nursing note reviewed.   Constitutional:       General: He is not in acute distress.     Appearance: He is not diaphoretic.   HENT:      Head: Normocephalic and atraumatic.      Right Ear: External ear normal.      Left Ear: External ear normal.      Nose: No congestion.      Mouth/Throat:      Mouth: Mucous membranes are moist.   Eyes:      General: No scleral icterus.        Right eye: No discharge.         Left eye: No discharge.      Extraocular Movements: Extraocular movements intact.      Conjunctiva/sclera: Conjunctivae normal.   Cardiovascular:      Rate and Rhythm: Normal rate.      Pulses: Normal pulses.      Heart sounds: No murmur heard.  Pulmonary:      Effort: Pulmonary effort is normal.      Breath sounds: No wheezing.   Abdominal:      General: Abdomen is flat.      Palpations: Abdomen is soft.      Tenderness: There is no abdominal tenderness. There is no guarding or rebound.   Musculoskeletal:         General: No swelling or deformity. Normal range of motion.      Cervical back: Normal range of motion. No rigidity.   Skin:     General: Skin is warm and dry.      Coloration: Skin is not jaundiced.      Findings: No bruising.   Neurological:      Mental Status: He is alert.      Motor: Weakness present.   Psychiatric:         Mood and Affect: Mood normal.         Thought Content: Thought content normal.     Patient was  seen and examined at bedside. No changes in physical exam from prior evaluation.    Fluids    Intake/Output Summary (Last 24 hours) at 2/23/2022 1312  Last data filed at 2/23/2022 0900  Gross per 24 hour   Intake 200 ml   Output --   Net 200 ml       Laboratory  Recent Labs     02/21/22  0517   WBC 5.5   RBC 4.34*   HEMOGLOBIN 13.5*   HEMATOCRIT 39.6*   MCV 91.2   MCH 31.1   MCHC 34.1   RDW 43.0   PLATELETCT 171   MPV 10.2     Recent Labs     02/21/22  0517   SODIUM 139   POTASSIUM 4.0   CHLORIDE 102   CO2 26   GLUCOSE 164*   BUN 18   CREATININE 0.78   CALCIUM 9.2                   Imaging  EC-ECHOCARDIOGRAM COMPLETE W/O CONT   Final Result      MR-CERVICAL SPINE-W/O   Final Result         Mild cervical spine degenerative changes without significant spinal canal stenosis or foraminal narrowing.      Remote right pontine lacunar infarct noted.      MR-BRAIN-W/O   Final Result         Acute infarct in the right posterior limb internal capsule and adjacent basal ganglia.      Multiple remote lacunar infarcts involving the bilateral cerebral hemispheric deep white matter. These are more numerous than on the previous study from 2018.      Interval progression of chronic deep white matter microvascular ischemic changes.      Gradient echo hypointense lesions along the medial right temporal cortex involving the hippocampus and the medial right parietal region may represent focal microhemorrhages related to hypertension.      DX-CHEST-PORTABLE (1 VIEW)   Final Result      No acute cardiopulmonary abnormality.      CT-CEREBRAL PERFUSION ANALYSIS   Final Result      1.  Cerebral blood flow less than 30% likely representing completed infarct = 0 mL.      2.  T Max more than 6 seconds likely representing combination of completed infarct and ischemia = 0 mL.      3.  Mismatched volume likely representing ischemic brain/penumbra = None      4.  Please note that the cerebral perfusion was performed on the limited brain tissue  around the basal ganglia region. Infarct/ischemia outside the CT perfusion sections can be missed in this study.      CT-CTA NECK WITH & W/O-POST PROCESSING   Final Result      Atherosclerosis of the bilateral carotid bifurcations/bulbs, left greater than right, with less than 50% stenosis.      Patent vertebral arteries.      CT-CTA HEAD WITH & W/O-POST PROCESS   Final Result      No thrombosis is seen within the [place].      CT-HEAD W/O   Final Result      1.  No acute intracranial abnormality is identified.   2.  There are periventricular and subcortical white matter changes present.  This finding is nonspecific and could be from previous small vessel ischemia, demyelination, or gliosis.   3.  Focal hypodensities in the basal ganglia bilaterally, daysi and periventricular white matter on the left likely represent old lacunar infarcts.   4.  Paranasal sinus disease as above described.                 Assessment/Plan  * Neurological deficit present- (present on admission)  Assessment & Plan  -Patient coming in for left-sided arm and leg weakness  -Symptoms similar to an episode he had 6 months ago that was deemed to TIA  -Still having mildly diminished strength in his left upper extremity  -CT imaging of head and neck in ER largely unremarkable  -Continue home aspirin 81 mg daily  -Continue home atorvastatin 80 mg daily  -Continue home clopidogrel 75 mg daily  Follow-up [id number] genetic testing    Anemia  Assessment & Plan  No signs of acute blood loss  monitor    TIA (transient ischemic attack)- (present on admission)  Assessment & Plan  -Neurology saw patient upon arrival to ED, recommended MRI brain and C-spine for evaluation of possible cord compression/for visualization/characterization of old infarct(s). Also recommends holding off with further cardiac monitoring.   -Continue with DAPT, Statin at current dosages for stroke optimization.     Type 2 diabetes mellitus with other specified complication  ([facility])- (present on admission)  Assessment & Plan  -Takes glipizide and metformin at home for his diabetes  -Most recent Hgb A1c in Nov 2021 was 8.4%  -Sliding scale insulin  -Resume metformin       VTE prophylaxis: therapeutic anticoagulation with Plavix    I have performed a physical exam and reviewed and updated ROS and Plan today (2/23/2022). In review of yesterday's note (2/22/2022), there are no changes except as documented above.

## 2022-02-23 NOTE — CARE PLAN
The patient is Stable - Low risk of patient condition declining or worsening    Shift Goals  Clinical Goals: Monitor neuro status/Safety  Patient Goals: Sleep  Family Goals: Go home soon after rehab    Progress made toward(s) clinical / shift goals: Q4hr neuro checks are in place. Insulin Regular coverage given for bedtime glucose accu-check. Fall precautions are in place. Patient pivots to wheelchair well with SBA. Bed is low and locked, bed alarm is on, call light is within reach, hourly rounding continues.     Problem: Fall Risk  Goal: Patient will remain free from falls  Outcome: Progressing  Note: Treaded slippers are on, yellow fall risk bracelet is on, fall risk sign is outside of door, bed alarm is on.      Problem: Neuro Status  Goal: Neuro status will remain stable or improve  Outcome: Progressing     Problem: Knowledge Deficit - Stroke Education  Goal: Patient's knowledge of stroke and risk factors will improve  Outcome: Progressing     Patient is not progressing towards the following goals:N/A

## 2022-02-23 NOTE — CARE PLAN
The patient is Stable - Low risk of patient condition declining or worsening    Shift Goals  Clinical Goals: Monitor neuro status, safety, mobilization  Patient Goals: Rest  Family Goals: KEVIN    Progress made toward(s) clinical / shift goals:  Pt AAOx4 throughout shift.  Up to chair with x1 assist. Family at bedside throughout shift. Pt and family updated on POC, plan to discharge to rehab pending insurance auth. Will continue hourly rounding.     Patient is not progressing towards the following goals:

## 2022-02-24 ENCOUNTER — HOSPITAL ENCOUNTER (INPATIENT)
Facility: REHABILITATION | Age: 57
LOS: 16 days | DRG: 057 | End: 2022-03-12
Attending: PHYSICAL MEDICINE & REHABILITATION | Admitting: PHYSICAL MEDICINE & REHABILITATION
Payer: COMMERCIAL

## 2022-02-24 VITALS
SYSTOLIC BLOOD PRESSURE: 137 MMHG | DIASTOLIC BLOOD PRESSURE: 84 MMHG | BODY MASS INDEX: 22.19 KG/M2 | RESPIRATION RATE: 16 BRPM | HEART RATE: 84 BPM | HEIGHT: 72 IN | OXYGEN SATURATION: 94 % | WEIGHT: 163.8 LBS | TEMPERATURE: 97 F

## 2022-02-24 DIAGNOSIS — I63.9 ACUTE ISCHEMIC STROKE (HCC): ICD-10-CM

## 2022-02-24 DIAGNOSIS — I63.9 RIGHT SIDED CEREBRAL HEMISPHERE CEREBROVASCULAR ACCIDENT (CVA) (HCC): ICD-10-CM

## 2022-02-24 DIAGNOSIS — E11.69 TYPE 2 DIABETES MELLITUS WITH OTHER SPECIFIED COMPLICATION, WITHOUT LONG-TERM CURRENT USE OF INSULIN (HCC): ICD-10-CM

## 2022-02-24 DIAGNOSIS — E11.42 DIABETIC POLYNEUROPATHY ASSOCIATED WITH TYPE 2 DIABETES MELLITUS (HCC): ICD-10-CM

## 2022-02-24 DIAGNOSIS — I25.5 ISCHEMIC CARDIOMYOPATHY: ICD-10-CM

## 2022-02-24 LAB
GLUCOSE BLD STRIP.AUTO-MCNC: 199 MG/DL (ref 65–99)
GLUCOSE BLD STRIP.AUTO-MCNC: 257 MG/DL (ref 65–99)
GLUCOSE BLD STRIP.AUTO-MCNC: 287 MG/DL (ref 65–99)

## 2022-02-24 PROCEDURE — 0240U HCHG SARS-COV-2 COVID-19 NFCT DS RESP RNA 3 TRGT MIC: CPT

## 2022-02-24 PROCEDURE — 94760 N-INVAS EAR/PLS OXIMETRY 1: CPT

## 2022-02-24 PROCEDURE — 700102 HCHG RX REV CODE 250 W/ 637 OVERRIDE(OP): Performed by: STUDENT IN AN ORGANIZED HEALTH CARE EDUCATION/TRAINING PROGRAM

## 2022-02-24 PROCEDURE — 82962 GLUCOSE BLOOD TEST: CPT | Mod: 91

## 2022-02-24 PROCEDURE — A9270 NON-COVERED ITEM OR SERVICE: HCPCS | Performed by: STUDENT IN AN ORGANIZED HEALTH CARE EDUCATION/TRAINING PROGRAM

## 2022-02-24 PROCEDURE — 700102 HCHG RX REV CODE 250 W/ 637 OVERRIDE(OP): Performed by: PHYSICAL MEDICINE & REHABILITATION

## 2022-02-24 PROCEDURE — 700102 HCHG RX REV CODE 250 W/ 637 OVERRIDE(OP): Performed by: HOSPITALIST

## 2022-02-24 PROCEDURE — A9270 NON-COVERED ITEM OR SERVICE: HCPCS | Performed by: HOSPITALIST

## 2022-02-24 PROCEDURE — A9270 NON-COVERED ITEM OR SERVICE: HCPCS | Performed by: PHYSICAL MEDICINE & REHABILITATION

## 2022-02-24 PROCEDURE — 770010 HCHG ROOM/CARE - REHAB SEMI PRIVAT*

## 2022-02-24 PROCEDURE — 99223 1ST HOSP IP/OBS HIGH 75: CPT | Performed by: PHYSICAL MEDICINE & REHABILITATION

## 2022-02-24 PROCEDURE — 99239 HOSP IP/OBS DSCHRG MGMT >30: CPT | Performed by: INTERNAL MEDICINE

## 2022-02-24 PROCEDURE — 700111 HCHG RX REV CODE 636 W/ 250 OVERRIDE (IP): Performed by: STUDENT IN AN ORGANIZED HEALTH CARE EDUCATION/TRAINING PROGRAM

## 2022-02-24 RX ORDER — AMOXICILLIN 250 MG
2 CAPSULE ORAL 2 TIMES DAILY
Status: DISCONTINUED | OUTPATIENT
Start: 2022-02-24 | End: 2022-02-28

## 2022-02-24 RX ORDER — ONDANSETRON 2 MG/ML
4 INJECTION INTRAMUSCULAR; INTRAVENOUS 4 TIMES DAILY PRN
Status: DISCONTINUED | OUTPATIENT
Start: 2022-02-24 | End: 2022-03-12 | Stop reason: HOSPADM

## 2022-02-24 RX ORDER — HYDRALAZINE HYDROCHLORIDE 25 MG/1
25 TABLET, FILM COATED ORAL EVERY 8 HOURS PRN
Status: DISCONTINUED | OUTPATIENT
Start: 2022-02-24 | End: 2022-03-12 | Stop reason: HOSPADM

## 2022-02-24 RX ORDER — GABAPENTIN 300 MG/1
300 CAPSULE ORAL EVERY EVENING
Status: DISCONTINUED | OUTPATIENT
Start: 2022-02-24 | End: 2022-03-12 | Stop reason: HOSPADM

## 2022-02-24 RX ORDER — METOPROLOL SUCCINATE 25 MG/1
12.5 TABLET, EXTENDED RELEASE ORAL 2 TIMES DAILY
Status: DISCONTINUED | OUTPATIENT
Start: 2022-02-24 | End: 2022-03-07

## 2022-02-24 RX ORDER — POLYVINYL ALCOHOL 14 MG/ML
1 SOLUTION/ DROPS OPHTHALMIC PRN
Status: DISCONTINUED | OUTPATIENT
Start: 2022-02-24 | End: 2022-03-12 | Stop reason: HOSPADM

## 2022-02-24 RX ORDER — ONDANSETRON 4 MG/1
4 TABLET, ORALLY DISINTEGRATING ORAL 4 TIMES DAILY PRN
Status: DISCONTINUED | OUTPATIENT
Start: 2022-02-24 | End: 2022-03-12 | Stop reason: HOSPADM

## 2022-02-24 RX ORDER — BISACODYL 10 MG
10 SUPPOSITORY, RECTAL RECTAL
Status: DISCONTINUED | OUTPATIENT
Start: 2022-02-24 | End: 2022-02-28

## 2022-02-24 RX ORDER — ATORVASTATIN CALCIUM 40 MG/1
80 TABLET, FILM COATED ORAL DAILY
Status: DISCONTINUED | OUTPATIENT
Start: 2022-02-25 | End: 2022-03-12 | Stop reason: HOSPADM

## 2022-02-24 RX ORDER — ACETAMINOPHEN 325 MG/1
650 TABLET ORAL EVERY 4 HOURS PRN
Status: DISCONTINUED | OUTPATIENT
Start: 2022-02-24 | End: 2022-03-12 | Stop reason: HOSPADM

## 2022-02-24 RX ORDER — OMEPRAZOLE 20 MG/1
20 CAPSULE, DELAYED RELEASE ORAL DAILY
Status: DISCONTINUED | OUTPATIENT
Start: 2022-02-25 | End: 2022-03-04

## 2022-02-24 RX ORDER — OXYCODONE HYDROCHLORIDE 5 MG/1
5 TABLET ORAL
Status: DISCONTINUED | OUTPATIENT
Start: 2022-02-24 | End: 2022-03-12 | Stop reason: HOSPADM

## 2022-02-24 RX ORDER — ALUMINA, MAGNESIA, AND SIMETHICONE 2400; 2400; 240 MG/30ML; MG/30ML; MG/30ML
20 SUSPENSION ORAL
Status: DISCONTINUED | OUTPATIENT
Start: 2022-02-24 | End: 2022-03-12 | Stop reason: HOSPADM

## 2022-02-24 RX ORDER — GABAPENTIN 300 MG/1
300 CAPSULE ORAL EVERY EVENING
Status: CANCELLED | OUTPATIENT
Start: 2022-02-24

## 2022-02-24 RX ORDER — PROMETHAZINE HYDROCHLORIDE 25 MG/1
12.5-25 SUPPOSITORY RECTAL EVERY 4 HOURS PRN
Status: DISCONTINUED | OUTPATIENT
Start: 2022-02-24 | End: 2022-03-12 | Stop reason: HOSPADM

## 2022-02-24 RX ORDER — PROMETHAZINE HYDROCHLORIDE 25 MG/1
12.5-25 TABLET ORAL EVERY 4 HOURS PRN
Status: DISCONTINUED | OUTPATIENT
Start: 2022-02-24 | End: 2022-03-12 | Stop reason: HOSPADM

## 2022-02-24 RX ORDER — CLOPIDOGREL BISULFATE 75 MG/1
75 TABLET ORAL DAILY
Status: CANCELLED | OUTPATIENT
Start: 2022-02-25

## 2022-02-24 RX ORDER — PROMETHAZINE HYDROCHLORIDE 25 MG/1
12.5-25 TABLET ORAL EVERY 4 HOURS PRN
Status: CANCELLED | OUTPATIENT
Start: 2022-02-24

## 2022-02-24 RX ORDER — DEXTROSE MONOHYDRATE 25 G/50ML
50 INJECTION, SOLUTION INTRAVENOUS
Status: CANCELLED | OUTPATIENT
Start: 2022-02-24

## 2022-02-24 RX ORDER — TRAZODONE HYDROCHLORIDE 50 MG/1
50 TABLET ORAL
Status: DISCONTINUED | OUTPATIENT
Start: 2022-02-24 | End: 2022-03-12 | Stop reason: HOSPADM

## 2022-02-24 RX ORDER — DEXTROSE MONOHYDRATE 25 G/50ML
50 INJECTION, SOLUTION INTRAVENOUS
Status: DISCONTINUED | OUTPATIENT
Start: 2022-02-24 | End: 2022-03-04

## 2022-02-24 RX ORDER — PROMETHAZINE HYDROCHLORIDE 25 MG/1
12.5-25 SUPPOSITORY RECTAL EVERY 4 HOURS PRN
Status: CANCELLED | OUTPATIENT
Start: 2022-02-24

## 2022-02-24 RX ORDER — ATORVASTATIN CALCIUM 80 MG/1
80 TABLET, FILM COATED ORAL DAILY
Status: CANCELLED | OUTPATIENT
Start: 2022-02-25

## 2022-02-24 RX ORDER — CLOPIDOGREL BISULFATE 75 MG/1
75 TABLET ORAL DAILY
Status: DISCONTINUED | OUTPATIENT
Start: 2022-02-25 | End: 2022-03-12 | Stop reason: HOSPADM

## 2022-02-24 RX ORDER — POLYETHYLENE GLYCOL 3350 17 G/17G
1 POWDER, FOR SOLUTION ORAL
Status: DISCONTINUED | OUTPATIENT
Start: 2022-02-24 | End: 2022-02-28

## 2022-02-24 RX ORDER — ECHINACEA PURPUREA EXTRACT 125 MG
2 TABLET ORAL PRN
Status: DISCONTINUED | OUTPATIENT
Start: 2022-02-24 | End: 2022-03-12 | Stop reason: HOSPADM

## 2022-02-24 RX ORDER — OXYCODONE HYDROCHLORIDE 10 MG/1
10 TABLET ORAL
Status: DISCONTINUED | OUTPATIENT
Start: 2022-02-24 | End: 2022-03-12 | Stop reason: HOSPADM

## 2022-02-24 RX ADMIN — ATORVASTATIN CALCIUM 80 MG: 80 TABLET, FILM COATED ORAL at 05:23

## 2022-02-24 RX ADMIN — CLOPIDOGREL BISULFATE 75 MG: 75 TABLET ORAL at 05:23

## 2022-02-24 RX ADMIN — ASPIRIN 81 MG: 81 TABLET, COATED ORAL at 05:23

## 2022-02-24 RX ADMIN — SENNOSIDES AND DOCUSATE SODIUM 2 TABLET: 50; 8.6 TABLET ORAL at 21:40

## 2022-02-24 RX ADMIN — ENOXAPARIN SODIUM 40 MG: 40 INJECTION SUBCUTANEOUS at 05:23

## 2022-02-24 RX ADMIN — INSULIN HUMAN 2 UNITS: 100 INJECTION, SOLUTION PARENTERAL at 08:53

## 2022-02-24 RX ADMIN — INSULIN HUMAN 5 UNITS: 100 INJECTION, SOLUTION PARENTERAL at 17:14

## 2022-02-24 RX ADMIN — METFORMIN HYDROCHLORIDE 500 MG: 500 TABLET ORAL at 17:10

## 2022-02-24 RX ADMIN — INSULIN HUMAN 5 UNITS: 100 INJECTION, SOLUTION PARENTERAL at 21:46

## 2022-02-24 RX ADMIN — GABAPENTIN 300 MG: 300 CAPSULE ORAL at 21:40

## 2022-02-24 RX ADMIN — METFORMIN HYDROCHLORIDE 500 MG: 500 TABLET ORAL at 08:52

## 2022-02-24 ASSESSMENT — LIFESTYLE VARIABLES
EVER_SMOKED: NEVER
TOTAL SCORE: 0
CONSUMPTION TOTAL: NEGATIVE
AVERAGE NUMBER OF DAYS PER WEEK YOU HAVE A DRINK CONTAINING ALCOHOL: 0
TOTAL SCORE: 0
HAVE YOU EVER FELT YOU SHOULD CUT DOWN ON YOUR DRINKING: NO
TOTAL SCORE: 0
EVER HAD A DRINK FIRST THING IN THE MORNING TO STEADY YOUR NERVES TO GET RID OF A HANGOVER: NO
EVER FELT BAD OR GUILTY ABOUT YOUR DRINKING: NO
ALCOHOL_USE: NO
HAVE PEOPLE ANNOYED YOU BY CRITICIZING YOUR DRINKING: NO
HOW MANY TIMES IN THE PAST YEAR HAVE YOU HAD 5 OR MORE DRINKS IN A DAY: 0
ON A TYPICAL DAY WHEN YOU DRINK ALCOHOL HOW MANY DRINKS DO YOU HAVE: 0

## 2022-02-24 ASSESSMENT — PAIN DESCRIPTION - PAIN TYPE
TYPE: ACUTE PAIN
TYPE: ACUTE PAIN

## 2022-02-24 ASSESSMENT — PATIENT HEALTH QUESTIONNAIRE - PHQ9
2. FEELING DOWN, DEPRESSED, IRRITABLE, OR HOPELESS: NOT AT ALL
SUM OF ALL RESPONSES TO PHQ9 QUESTIONS 1 AND 2: 0
1. LITTLE INTEREST OR PLEASURE IN DOING THINGS: NOT AT ALL

## 2022-02-24 ASSESSMENT — FIBROSIS 4 INDEX: FIB4 SCORE: 1.73

## 2022-02-24 NOTE — DISCHARGE SUMMARY
Discharge Summary    CHIEF COMPLAINT ON ADMISSION  Chief Complaint   Patient presents with   • Weakness     LUE and LLE weakness     • Possible Stroke       Reason for Admission  EMS     Admission Date  2/17/2022    CODE STATUS  Full Code    HPI & HOSPITAL COURSE  Mr. Manan Aviles is a 56-year-old male with a PMHx of HTN, HLD, DMT2, CAD, history of left frontal stroke in 2018, admitted on 2/17/2022 due to left sided deficits. NIHSS score was at 2 on presentation.  Patient reports he has been compliant with medication including ASA and Plavix therapy.  Stroke protocol was initiated and CT of the head did not reveal any acute hemorrhage, or large territory stroke, or critical stenosis, or large vessel occlusions.  CTA also noted no focal perfusion defect. Neurology was consulted. MRI brain demonstrated acute infarct in right posterior limb of internal capsule and basal ganglia. Echo demonstrated EG 60%, no thrombus. Medication regimen was optimized. Case management assisted with transfer to inpatient rehab. Patient was determined satisfactory for discharge with appropriate follow up.     Therefore, he is discharged in fair and stable condition to an inpatient rehabilitation hospital.    The patient met 2-midnight criteria for an inpatient stay at the time of discharge.    Discharge Date  2/24/2022    FOLLOW UP ITEMS POST DISCHARGE  Please follow up with PCP in 3-5 days for post hospitalization follow up and medication reconciliation.     DISCHARGE DIAGNOSES  Principal Problem:    Neurological deficit present POA: Yes  Active Problems:    Uncontrolled type 2 diabetes mellitus with hyperglycemia (HCC) POA: Yes      Overview: Labs of 11/10/21 show A1c 8.6% and fasting glucose 183.  He is only taking       Metformin and Glipizide.  Not seeing Endocrinology.        Type 2 diabetes mellitus with other specified complication (HCC) POA: Yes    TIA (transient ischemic attack) POA: Yes    Stroke-like symptoms POA: Yes    Anemia  POA: Unknown  Resolved Problems:    * No resolved hospital problems. *      FOLLOW UP  Future Appointments   Date Time Provider Department Center   7/26/2022  9:40 AM Mahesh Segovia M.D. [facility] None     Donny Mcnally M.D.  [address]  [place] [address] [phone]    Call  Please call your Primary Care Provider to schedule a hospital follow up. Thank you.       MEDICATIONS ON DISCHARGE     Medication List      START taking these medications      Instructions   gabapentin 300 MG Caps  Commonly known as: NEURONTIN   Take 1 Capsule by mouth every evening.  Dose: 300 mg        CONTINUE taking these medications      Instructions   aspirin 81 MG EC tablet   Take 1 tablet by mouth every day.  Dose: 81 mg     atorvastatin 80 MG tablet  Commonly known as: LIPITOR   Take 1 Tab by mouth every day.  Dose: 80 mg     clopidogrel 75 MG Tabs  Commonly known as: PLAVIX   Take 1 tablet by mouth every day.  Dose: 75 mg     metFORMIN 500 MG Tabs  Commonly known as: GLUCOPHAGE   Take 500 mg by mouth 2 times a day with meals.  Dose: 500 mg     metoprolol SR 25 MG Tb24  Commonly known as: TOPROL XL   Take 0.5 Tablets by mouth 2 times a day.  Dose: 12.5 mg     multivitamin Tabs   Take 1 Tablet by mouth every day.  Dose: 1 Tablet        STOP taking these medications    glipiZIDE 5 MG Tabs  Commonly known as: GLUCOTROL            Allergies  No Known Allergies    DIET  Orders Placed This Encounter   Procedures   • Diet Order Diet: Consistent CHO (Diabetic); Miscellaneous modifications: (optional): Vegetarian     Standing Status:   Standing     Number of Occurrences:   1     Order Specific Question:   Diet:     Answer:   Consistent CHO (Diabetic) [4]     Order Specific Question:   Miscellaneous modifications: (optional)     Answer:   Vegetarian [13]   • Discontinue Diet Tray     Standing Status:   Standing     Number of Occurrences:   1       ACTIVITY  As tolerated.  Weight bearing as  tolerated    CONSULTATIONS  Neurology    PROCEDURES  N/A    LABORATORY  Lab Results   Component Value Date    SODIUM 139 02/21/2022    POTASSIUM 4.0 02/21/2022    CHLORIDE 102 02/21/2022    CO2 26 02/21/2022    GLUCOSE 164 (H) 02/21/2022    BUN 18 02/21/2022    CREATININE 0.78 02/21/2022    CREATININE 0.9 05/09/2007        Lab Results   Component Value Date    WBC 5.5 02/21/2022    HEMOGLOBIN 13.5 (L) 02/21/2022    HEMATOCRIT 39.6 (L) 02/21/2022    PLATELETCT 171 02/21/2022        Total time of the discharge process exceeds 45 minutes.

## 2022-02-24 NOTE — CARE PLAN
The patient is Stable - Low risk of patient condition declining or worsening    Shift Goals  Clinical Goals: Stable neuro status  Patient Goals: Discharge  Family Goals: Go home soon after rehab    Progress made toward(s) clinical / shift goals:    Problem: Knowledge Deficit - Stroke Education  Goal: Patient's knowledge of stroke and risk factors will improve  Outcome: Progressing  Note: Pt demonstrates understanding of plan of care.      Problem: Neuro Status  Goal: Neuro status will remain stable or improve  Outcome: Progressing  Note: Pt's neuro status remains stable over shift.

## 2022-02-24 NOTE — PROGRESS NOTES
2 RN Skin Check    2 RN skin check complete with KEENA Randolph  Devices in place: na.  Skin assessed under devices: N\A.  Confirmed pressure ulcers found on: na.  New potential pressure ulcers noted on rt heel. Wound consult placed N/A.  The following interventions in place Pillows, Lotion and pillows to float heels.

## 2022-02-24 NOTE — DISCHARGE PLANNING
Anticipated Discharge Disposition: [facility]    Action: patient to dc to [facility]. Cobra signed by patient. Left in chart cabinet. Bedside RN informed via voalte.    Barriers to Discharge: none    Plan[facility]

## 2022-02-24 NOTE — PROGRESS NOTES
Patient admitted to facility at 1200 via W/C; accompanied by hospital transport.  Patient assisted to room and positioned in bed for comfort and safety; call light within reach.  Patient assisted with stowing belongings and oriented to room and facility.  Admission assessment performed and documented in computer.  Admission paperwork completed; signed copies placed in chart.  Will continue to monitor.

## 2022-02-24 NOTE — CARE PLAN
The patient is Stable - Low risk of patient condition declining or worsening    Shift Goals  Clinical Goals: Monitor neuro status  Patient Goals: Discharge  Family Goals: Go home soon after rehab    Progress made toward(s) clinical / shift goals: Q4hr neuro checks are in place. Insulin Regular coverage given for bedtime glucose accu-check. Fall precautions are in place. Bed is low and locked, bed alarm is on, call light is within reach, hourly rounding continues.     Problem: Knowledge Deficit - Stroke Education  Goal: Patient's knowledge of stroke and risk factors will improve  Outcome: Progressing     Problem: Neuro Status  Goal: Neuro status will remain stable or improve  Outcome: Progressing     Problem: Fall Risk  Goal: Patient will remain free from falls  Outcome: Progressing     Patient is not progressing towards the following goals:N/A

## 2022-02-24 NOTE — DISCHARGE INSTRUCTIONS
"Discharge Instructions    Discharged to other by [facility] with escort. Discharged via wheelchair, hospital escort: Yes.  Special equipment needed: Wheelchair    Be sure to schedule a follow-up appointment with your primary care doctor or any specialists as instructed.     Discharge Plan:   Diet Plan: Discussed  Activity Level: Discussed  Confirmed Follow up Appointment: Patient to Call and Schedule Appointment  Confirmed Symptoms Management: Discussed  Medication Reconciliation Updated: Yes  Influenza Vaccine Indication: Not indicated: Previously immunized this influenza season and > 8 years of age    I understand that a diet low in cholesterol, fat, and sodium is recommended for good health. Unless I have been given specific instructions below for another diet, I accept this instruction as my diet prescription.   Other diet: Diabetic    Special Instructions:     Stroke/CVA/TIA/Hemorrhagic Ischemia Discharge Instructions  You have had a stroke. Your risk factors have been identified as follows:  Age - Over 55  Gender - Men are at a higher risk than women  Diabetes  Previous TIAs or \"mini strokes\"  High Cholesterol and lipids  It is important that you reduce your risk factors to avoid another stroke in the future. Here are some general guidelines to follow:  · Eat healthy - avoid food high in fat.  · Get regular exercise.  · Maintain a healthy weight.  · Avoid smoking.  · Avoid alcohol and illegal drug use.  · Take your medications as directed.  For more information regarding risk factors, refer to pages 17-19 in your Stroke Patient Education Guide. Stroke Education Guide was given to patient.    Warning signs of a stroke include (which can also be found on page 3 of your Stroke Patient Education Guide):  · Sudden numbness of weakness of the face, arm or leg (especially on one side of the body).  · Sudden confusion, trouble speaking or understanding.  · Sudden trouble seeing in one or both eyes.  · Sudden trouble " walking, dizziness, loss of balance or coordination.  · Sudden severe headache with no known cause.  It is very important to get treatment quickly when a stroke occurs. If you experience any of the above warning signs, call [phone] immediately.     Some patients who have had a stroke will be going home on a blood thinner medication called Warfarin (Coumadin).  This medication requires very close monitoring and follow up.  This follow up can be provided by either your Primary Care Physician or by [facility] Outpatient Anticoagulation Service.  The Outpatient Anticoagulation Service is located at the [facility] ([facility]).  If you do not know when your follow up appointment is scheduled, call [phone] to verify your appointment time.      · Is patient discharged on Warfarin / Coumadin?   No     Depression / Suicide Risk    As you are discharged from this [facility], it is important to learn how to keep safe from harming yourself.    Recognize the warning signs:  · Abrupt changes in personality, positive or negative- including increase in energy   · Giving away possessions  · Change in eating patterns- significant weight changes-  positive or negative  · Change in sleeping patterns- unable to sleep or sleeping all the time   · Unwillingness or inability to communicate  · Depression  · Unusual sadness, discouragement and loneliness  · Talk of wanting to die  · Neglect of personal appearance   · Rebelliousness- reckless behavior  · Withdrawal from people/activities they love  · Confusion- inability to concentrate     If you or a loved one observes any of these behaviors or has concerns about self-harm, here's what you can do:  · Talk about it- your feelings and reasons for harming yourself  · Remove any means that you might use to hurt yourself (examples: pills, rope, extension cords, firearm)  · Get professional help from the community  (Mental Health, Substance Abuse, psychological counseling)  · Do not be alone:Call your Safe Contact- someone whom you trust who will be there for you.  · Call your local CRISIS HOTLINE 252-5244 or 368-043-3886  · Call your local Children's Mobile Crisis Response Team [place] (693) 956-3729 or www.Easy Pairings  · Call the toll free National Suicide Prevention Hotlines   · National Suicide Prevention Lifeline 745-464-POYD (2594)  · National Hope Line Network 800-SUICIDE (854-4114)      Stroke Prevention  Some medical conditions and lifestyle choices can lead to a higher risk for a stroke. You can help to prevent a stroke by making nutrition, lifestyle, and other changes.  What nutrition changes can be made?    · Eat healthy foods.  ? Choose foods that are high in fiber. These include:  § Fresh fruits.  § Fresh vegetables.  § Whole grains.  ? Eat at least 5 or more servings of fruits and vegetables each day. Try to fill half of your plate at each meal with fruits and vegetables.  ? Choose lean protein foods. These include:  § Lowfat (lean) cuts of meat.  § Chicken without skin.  § Fish.  § Tofu.  § Beans.  § Nuts.  ? Eat low-fat dairy products.  ? Avoid foods that:  § Are high in salt (sodium).  § Have saturated fat.  § Have trans fat.  § Have cholesterol.  § Are processed.  § Are premade.  · Follow eating guidelines as told by your doctor. These may include:  ? Reducing how many calories you eat and drink each day.  ? Limiting how much salt you eat or drink each day to 1,500 milligrams (mg).  ? Using only healthy fats for cooking. These include:  § Olive oil.  § Canola oil.  § Sunflower oil.  ? Counting how many carbohydrates you eat and drink each day.  What lifestyle changes can be made?  · Try to stay at a healthy weight. Talk to your doctor about what a good weight is for you.  · Get at least 30 minutes of moderate physical activity at least 5 days a week. This can include:  ? Fast  walking.  ? Biking.  ? Swimming.  · Do not use any products that have nicotine or tobacco. This includes cigarettes and e-cigarettes. If you need help quitting, ask your doctor. Avoid being around tobacco smoke in general.  · Limit how much alcohol you drink to no more than 1 drink a day for nonpregnant women and 2 drinks a day for men. One drink equals 12 oz of beer, 5 oz of wine, or 1½ oz of hard liquor.  · Do not use drugs.  · Avoid taking birth control pills. Talk to your doctor about the risks of taking birth control pills if:  ? You are over 35 years old.  ? You smoke.  ? You get migraines.  ? You have had a blood clot.  What other changes can be made?  · Manage your cholesterol.  ? It is important to eat a healthy diet.  ? If your cholesterol cannot be managed through your diet, you may also need to take medicines. Take medicines as told by your doctor.  · Manage your diabetes.  ? It is important to eat a healthy diet and to exercise regularly.  ? If your blood sugar cannot be managed through diet and exercise, you may need to take medicines. Take medicines as told by your doctor.  · Control your high blood pressure (hypertension).  ? Try to keep your blood pressure below 130/80. This can help lower your risk of stroke.  ? It is important to eat a healthy diet and to exercise regularly.  ? If your blood pressure cannot be managed through diet and exercise, you may need to take medicines. Take medicines as told by your doctor.  ? Ask your doctor if you should check your blood pressure at home.  ? Have your blood pressure checked every year. Do this even if your blood pressure is normal.  · Talk to your doctor about getting checked for a sleep disorder. Signs of this can include:  ? Snoring a lot.  ? Feeling very tired.  · Take over-the-counter and prescription medicines only as told by your doctor. These may include aspirin or blood thinners (antiplatelets or anticoagulants).  · Make sure that any other  "medical conditions you have are managed.  Where to find more information  · American Stroke Association: www.strokeassociation.org  · National Stroke Association: www.stroke.org  Get help right away if:  · You have any symptoms of stroke. \"BE FAST\" is an easy way to remember the main warning signs:  ? B - Balance. Signs are dizziness, sudden trouble walking, or loss of balance.  ? E - Eyes. Signs are trouble seeing or a sudden change in how you see.  ? F - Face. Signs are sudden weakness or loss of feeling of the face, or the face or eyelid drooping on one side.  ? A - Arms. Signs are weakness or loss of feeling in an arm. This happens suddenly and usually on one side of the body.  ? S - Speech. Signs are sudden trouble speaking, slurred speech, or trouble understanding what people say.  ? T - Time. Time to call emergency services. Write down what time symptoms started.  · You have other signs of stroke, such as:  ? A sudden, very bad headache with no known cause.  ? Feeling sick to your stomach (nausea).  ? Throwing up (vomiting).  ? Jerky movements you cannot control (seizure).  These symptoms may represent a serious problem that is an emergency. Do not wait to see if the symptoms will go away. Get medical help right away. Call your local emergency services (911 in the [place] Do not drive yourself to the hospital.  Summary  · You can prevent a stroke by eating healthy, exercising, not smoking, drinking less alcohol, and treating other health problems, such as diabetes, high blood pressure, or high cholesterol.  · Do not use any products that contain nicotine or tobacco, such as cigarettes and e-cigarettes.  · Get help right away if you have any signs or symptoms of a stroke.  This information is not intended to replace advice given to you by your health care provider. Make sure you discuss any questions you have with your health care provider.  Document Released: 06/18/2013 Document Revised: 02/13/2020 Document " Reviewed: 03/21/2018  Elsevier Patient Education © 2020 [employer]

## 2022-02-24 NOTE — H&P
REHABILITATION HISTORY AND PHYSICAL/POST ADMISSION PHYSICAL EVALUATION    Date of Admission: 2/24/2022  Israel Aviles  RH20/02    [facility] Code / Diagnosis to Support: 0001.2 - Stroke: Right Body Involvement (Left Brain)  Addendum: [facility] switched 0001.1 - Stroke Left body (right brain)  Etiologic Diagnosis: Right sided cerebral hemisphere cerebrovascular accident (CVA) (HCC)    CC: Decreased mobility, left sided weakness    HPI:  Adapted from Dr. Kerns's PM&R Consult:  The patient is a 56 y.o. right hand dominant male with a past medical history of hypertension, hyperlipidemia type 2 diabetes, coronary artery disease, history of left frontal stroke in 2018, Left BKA in 12/2019;  who presented on 2/17/2022  9:37 AM with left-sided weakness, left facial droop.  Patient was evaluated by neurology on arrival to [facility], found to have an NIH score of 2 for left facial droop and left arm weakness.  Patient has been compliant with aspirin and Plavix.  Patient did not require TPA.  CT head showed no acute abnormalities, CT perfusion showed no mismatch.  MR brain found acute infarct in the right posterior limb of internal capsule adjacent to the basal ganglia.  MR C-spine was also pursued which found mild cervical degenerative changes.      Of note, patient was treated at [facility] rehab in late December 2019 and successfully discharged home with family support in early January 2020 for L BKA by Dr. Zhao.  Patient was treated by Dr. Alejandro Bai MD at that time.    Patient tolerated transfer to [facility]. He reports his left side is getting stronger every day. He reports he is working in the bed to even exercise. He reports his arm is more affected than his leg. Denies pain. Denies NVD. Denies SOB.     REVIEW OF SYSTEMS:     Comprehensive 14 point ROS was reviewed and all were negative except as noted elsewhere in this document.     PMH:  Past Medical History:   Diagnosis Date   • CAD (coronary artery disease)    • Diabetes  ([facility])    • Hyperlipidemia    • Hypertension        PSH:  Past Surgical History:   Procedure Laterality Date   • KNEE AMPUTATION BELOW Left 12/20/2019    Procedure: AMPUTATION, BELOW KNEE;  Surgeon: Koby Zhao M.D.;  Location: SURGERY [facility];  Service: Orthopedics   • ZZZ CARDIAC CATH  12/16/2019    multi-vessel disease   • IRRIGATION & DEBRIDEMENT ORTHO Left 6/24/2019    Procedure: IRRIGATION AND DEBRIDEMENT, WOUND-FOOT, BIOLOGIC PLACEMENT, WOUND VAC PLACEMENT;  Surgeon: Charles Chopra M.D.;  Location: SURGERY [facility];  Service: Orthopedics       FAMILY HISTORY:  Family History   Problem Relation Age of Onset   • Diabetes Mother    • Diabetes Father          MEDICATIONS:  Current Facility-Administered Medications   Medication Dose   • metoprolol SR (TOPROL XL) tablet 12.5 mg  12.5 mg   • Respiratory Therapy Consult     • Pharmacy Consult Request ...Pain Management Review 1 Each  1 Each   • hydrALAZINE (APRESOLINE) tablet 25 mg  25 mg   • acetaminophen (Tylenol) tablet 650 mg  650 mg   • senna-docusate (PERICOLACE or SENOKOT S) 8.6-50 MG per tablet 2 Tablet  2 Tablet    And   • polyethylene glycol/lytes (MIRALAX) PACKET 1 Packet  1 Packet    And   • magnesium hydroxide (MILK OF MAGNESIA) suspension 30 mL  30 mL    And   • bisacodyl (DULCOLAX) suppository 10 mg  10 mg   • [START ON 2/25/2022] omeprazole (PRILOSEC) capsule 20 mg  20 mg   • artificial tears ophthalmic solution 1 Drop  1 Drop   • benzocaine-menthol (Cepacol) lozenge 1 Lozenge  1 Lozenge   • mag hydrox-al hydrox-simeth (MAALOX PLUS ES or MYLANTA DS) suspension 20 mL  20 mL   • ondansetron (ZOFRAN ODT) dispertab 4 mg  4 mg    Or   • ondansetron (ZOFRAN) syringe/vial injection 4 mg  4 mg   • traZODone (DESYREL) tablet 50 mg  50 mg   • sodium chloride (OCEAN) 0.65 % nasal spray 2 Spray  2 Spray   • oxyCODONE immediate-release (ROXICODONE) tablet 5 mg  5 mg    Or   • oxyCODONE immediate release (ROXICODONE) tablet 10 mg  10 mg   •  [START ON 2/25/2022] aspirin EC (ECOTRIN) tablet 81 mg  81 mg   • [START ON 2/25/2022] atorvastatin (LIPITOR) tablet 80 mg  80 mg   • [START ON 2/25/2022] clopidogrel (PLAVIX) tablet 75 mg  75 mg   • [START ON 2/25/2022] enoxaparin (LOVENOX) inj 40 mg  40 mg   • gabapentin (NEURONTIN) capsule 300 mg  300 mg   • insulin regular (HumuLIN R,NovoLIN R) injection  2-9 Units    And   • dextrose 50% (D50W) injection 50 mL  50 mL   • metFORMIN (GLUCOPHAGE) tablet 500 mg  500 mg   • promethazine (PHENERGAN) suppository 12.5-25 mg  12.5-25 mg   • promethazine (PHENERGAN) tablet 12.5-25 mg  12.5-25 mg       ALLERGIES:  Patient has no known allergies.    PSYCHOSOCIAL HISTORY:  Housing / Facility: 1 Story House  Steps Into Home: 0  Lives with - Patient's Self Care Capacity: Spouse  Equipment Owned: None     Prior Level of Function / Living Situation:   Physical Therapy: Prior Services: Home-Independent  Housing / Facility: 1 Story House  Steps Into Home: 0  Bathroom Set up: Walk In Shower,Shower Chair  Equipment Owned: None  Lives with - Patient's Self Care Capacity: Spouse  Bed Mobility: Independent  Transfer Status: Independent  Ambulation: Independent  Distance Ambulation (Feet):  (community ambulator)  Assistive Devices Used: None  Current Level of Function:   Gait Level Of Assist: Unable to Participate  Assistive Device: Front Wheel Walker  Weight Bearing Status: no restrictions  Supine to Sit: Moderate Assist  Sit to Supine:  (left up in chair)  Scooting: Minimal Assist  Rolling: Minimum Assist to Lt.  Skilled Intervention: Verbal Cuing  Sit to Stand: Minimal Assist  Bed, Chair, Wheelchair Transfer: Moderate Assist  Toilet Transfers: Moderate Assist  Transfer Method: Stand Pivot  Skilled Intervention: Verbal Cuing  Sitting in Chair: 10min  Sitting Edge of Bed: 10min  Standing: 3minx2  Occupational Therapy:   Self Feeding: Independent  Grooming / Hygiene: Independent  Bathing: Independent  Dressing: Independent  Toileting:  Independent  Medication Management: Independent  Laundry: Independent  Kitchen Mobility: Independent  Finances: Independent  Home Management: Independent  Shopping: Independent  Prior Level Of Mobility: Independent Without Device in Community  Prior Services: Home-Independent  Housing / Facility: [address]  Current Level of Function:   Upper Body Dressing: Moderate Assist  Lower Body Dressing: Maximal Assist  Toileting: Minimal Assist  Skilled Intervention: Verbal Cuing    CURRENT LEVEL OF FUNCTION:   Same as level of function prior to admission to [facility]    PHYSICAL EXAM:     VITAL SIGNS:   height is 1.829 m (6') and weight is 75 kg (165 lb 5.5 oz). His oral temperature is 36.7 °C (98 °F). His blood pressure is 130/87 and his pulse is 91. His respiration is 18 and oxygen saturation is 98%.     GENERAL: No apparent distress  HEENT: Normocephalic/atraumatic, EOMI and PERRL  CARDIAC: Regular rate and rhythm, normal S1, S2   LUNGS: Clear to auscultation   ABDOMINAL: bowel sounds present, soft and nontender    EXTREMITIES: no contractures, spasticity, or edema.  Left BKA well healing, shaped correctly without edema  NEURO:  Mental status:  A&Ox4 (person, place, date, situation) answers questions appropriately  Speech: fluent, no aphasia or dysarthria  CRANIAL NERVES: CN 2-12 intact    Motor:  5/5 RUE and RLE  2/5 L SF, 2/5 EE, 3/5 FF and Nito  Sensory: intact to light touch through out  DTRs: 3+ in L biceps tendons      RADIOLOGY:  TTE 2/18/22  CONCLUSIONS  Technically difficult study incomplete information is obtained.   Normal left ventricular systolic function.  The left ventricular ejection fraction is visually estimated to be 60%.  Agitated saline (bubble) study was attempted with and without Valsalva   maneuver, however, bubbles not well visualized due to difficult imaging   window.  No evidence of valvular abnormality based on Doppler evaluation    MRI Brain  2/17/22  IMPRESSION:        Acute infarct in the right posterior limb internal capsule and adjacent basal ganglia.     Multiple remote lacunar infarcts involving the bilateral cerebral hemispheric deep white matter. These are more numerous than on the previous study from 2018.     Interval progression of chronic deep white matter microvascular ischemic changes.     Gradient echo hypointense lesions along the medial right temporal cortex involving the hippocampus and the medial right parietal region may represent focal microhemorrhages related to hypertension.        LABS:                  PRIMARY REHAB DIAGNOSIS:    This patient is a 56 y.o. male admitted for acute inpatient rehabilitation with Right sided cerebral hemisphere cerebrovascular accident (CVA) (HCC).    IMPAIRMENTS:   Cognitive  ADLs/IADLs  Mobility    SECONDARY DIAGNOSIS/MEDICAL CO-MORBIDITIES AFFECTING FUNCTION:  DM2 with hyperglycemia  L BKA  HTN/CAD  HLD  Neuropathy    RELEVANT CHANGES SINCE PREADMISSION EVALUATION:    Status unchanged    The patient's rehabilitation potential is Good  The patient's medical prognosis is good    PLAN:   Discussion and Recommendations:   1. The patient requires an acute inpatient rehabilitation program with a coordinated program of care at an intensity and frequency not available at a lower level of care. This recommendation is substantiated by the patient's medical physicians who recommend that the patient's intervention and assessment of medical issues needs to be done at an acute level of care for patient's safety and maximum outcome.   2. A coordinated program of care will be supplied by an interdisciplinary team of physical therapy, occupational therapy, rehab physician, rehab nursing, and, if needed, speech therapy and rehab psychology. Rehab team presents a patient-specific rehabilitation and education program concentrating on prevention of future problems related to accessibility, mobility, skin, bowel,  bladder, sexuality, and psychosocial and medical/surgical problems.   3. Need for Rehabilitation Physician: The rehab physician will be evaluating the patient on a multi-weekly basis to help coordinate the program of care. The rehab physician communicates between medical physicians, therapists, and nurses to maximize the patient's potential outcome. Specific areas in which the rehab physician will be providing daily assessment include the following:   A. Assessing the patient's heart rate and blood pressure response (vitals monitoring) to activity and making adjustments in medications or conservative measures as needed.   B. The rehab physician will be assessing the frequency at which the program can be increased to allow the patient to reach optimal functional outcome.   C. The rehab physician will also provide assessments in daily skin care, especially in light of patient's impairments in mobility.   D. The rehab physician will provide special expertise in understanding how to work with functional impairment and recommend appropriate interventions, compensatory techniques, and education that will facilitate the patient's outcome.   4. Rehab R.N.   The rehab RN will be working with patient to carry over in room mobility and activities of daily living when the patient is not in 3 hours of skilled therapy. Rehab nursing will be working in conjunction with rehab physician to address all the medical issues above and continue to assess laboratory work and discuss abnormalities with the treating physicians, assess vitals, and response to activity, and discuss and report abnormalities with the rehab physician. Rehab RN will also continue daily skin care, supervise bladder/bowel program, instruct in medication administration, and ensure patient safety.   5. Rehab Therapy: Therapies to treat at intensity and frequency of (may change after completion of evaluation by all therapeutic disciplines):       PT:  Physical therapy  to address mobility, transfer, gait training and evaluation for adaptive equipment needs 1 hour/day at least 5 days/week for the duration of the ELOS (see below)       OT:  Occupational therapy to address ADLs, self-care, home management training, functional mobility/transfers and assistive device evaluation, and community re-integration 1  hour/day at least 5 days/week for the duration of the ELOS (see below).        ST/Dysphagia:  Speech therapy to address speech, language, and cognitive deficits as well as swallowing difficulties with retraining/dysphagia management and community re-integration with comprehension, expression, cognitive training 1  hour/day at least 5 days/week for the duration of the ELOS (see below).     Medical management / Rehabilitation Issues/ Adverse Potential as part of rehabilitation plan     REHABILITATION ISSUES/ADVERSE POTENTIAL:  1.  CVA (Cerebrovascular Accident): Cont ASA and Plavix for secondary prophylaxis as well as lipid and blood pressure management. Patient demonstrates functional deficits in strength, balance, coordination, and ADL's. Patient is admitted to [facility] for comprehensive rehabilitation therapy as described below.   Rehabilitation nursing monitors bowel and bladder control, educates on medication administration, co-morbidities and monitors patient safety.    2.  Neurostimulants: None at this time but continue to assess daily for need to initiate should status change.    3.  DVT prophylaxis:  Patient is on Lovenox for anticoagulation upon transfer. Encourage OOB. Monitor daily for signs and symptoms of DVT including but not limited to swelling and pain to prevent the development of DVT that may interfere with therapies.    4.  GI prophylaxis:  On prilosec to prevent gastritis/dyspepsia which may interfere with therapies.    5.  Pain: No issues with pain currently / Controlled with APAP/Tramadol    6.  Nutrition/Dysphagia: Dietician monitors  nutrient intake, recommend supplements prn and provide nutrition education to pt/family to promote optimal nutrition for wound healing/recovery.     7.  Bladder/bowel:  Start bowel and bladder program as described below, to prevent constipation, urinary retention (which may lead to UTI), and urinary incontinence (which will impact upon pt's functional independence).   - Post void bladder scans, I&O cath for PVRs >400  - up to commode after meal     8.  Skin/dermal ulcer prophylaxis: Monitor for new skin conditions with q.2 h. turns as required to prevent the development of skin breakdown.     9.  Cognition/Behavior: As needed psychologist provides adjustment counseling to illness and psychosocial barriers that may be potential barriers to rehabilitation.     10. Respiratory therapy: RT performs O2 management prn, breathing retraining, pulmonary hygiene and bronchospasm management prn to optimize participation in therapies.     MEDICAL CO-MORBIDITIES/ADVERSE POTENTIAL AFFECTING FUNCTION:  R CVA - Patient with acute posterior internal capsule infarct with weakness UE > LE complicated by previous L BKA. Patient is improving quickly  -PT and OT for mobility and ADLs  -SLP For cognitive screen    HTN/CAD - Patient on ASA and Plavix. Patient on Metoprolol 12.5 mg BID    DM2 with hyperglycemia - Patient on SSI and Metformin 500 mg BID on transfer.     L BKA - Limited due to weakness and weight of prosthetic. Monitor for skin breakdown    HLD - Patient on Atorvastatin 80 mg QHS    Neuropathy - Patient on Gabapentin 300 mg QHS    DVT Ppx - Patient on Lovenox on transfer.     I personally performed a complete drug regimen review and no potential clinically significant medication issues were identified.     Pt was seen today for 72 min, and entire time spent in face-to-face contact was >50% in counseling and coordination of care as detailed in A/P above.        GOALS/EXPECTED LEVEL OF FUNCTION BASED ON CURRENT MEDICAL AND  FUNCTIONAL STATUS (may change based on patient's medical status and rate of impairment recovery):  Transfers:   Modified Independent  Mobility/Gait:   Modified Independent  ADL's:   Modified Independent  Cognition:  Zac    DISPOSITION: Discharge to pre-morbid independent living setting with the supportive care of patient's family.    ELOS: 10-14 days

## 2022-02-24 NOTE — CARE PLAN
Final Anesthesia Post-op Assessment    Patient: Remigio Merino  Procedure(s) Performed: EXCISION OF LIPOMA OF THE BACK  Anesthesia type: Monitor Anesthesia Care    Vital Last Value   Temperature 36.7 °C (98.1 °F) (12/20/17 0610)   Pulse (!) 48 (12/20/17 0610)   Respiratory Rate 20 (12/20/17 0610)   Non-Invasive   Blood Pressure (!) 140/91 (12/20/17 0610)   Arterial  Blood Pressure     Pulse Oximetry 99 % (12/20/17 0610)     Last 24 I/O:   Intake/Output Summary (Last 24 hours) at 12/20/17 0807  Last data filed at 12/20/17 0806   Gross per 24 hour   Intake              500 ml   Output                0 ml   Net              500 ml       PATIENT LOCATION: PACU Phase 2  POST-OP VITAL SIGNS: stable  LEVEL OF CONSCIOUSNESS: participates in exam, awake, answers questions appropriately, alert and oriented  RESPIRATORY STATUS: spontaneous ventilation and unassisted  CARDIOVASCULAR: blood pressure returned to baseline  HYDRATION: euvolemic    PAIN MANAGEMENT: well controlled  NAUSEA: None  AIRWAY PATENCY: patent  POST-OP ASSESSMENT: no complications, patient tolerated procedure well with no complications, no evidence of recall and sufficiently recovered from acute administration of anesthesia effects and able to participate in evaluation  COMPLICATIONS: none    Mental status: recovered, patient participates in evaluation.  Last Vital Signs noted less than 5 minutes ago are stable and are documented in the anesthesia record.   Respiratory function satisfactory; airway patent.   Cardiovascular function and hydration status satisfactory.   Adequate pain control.   Nausea and vomiting control satisfactory.   No apparent anesthetic complications.            The patient is Watcher - Medium risk of patient condition declining or worsening    Shift Goals  Clinical Goals: safety  Patient Goals: strength      Problem: Knowledge Deficit - Standard  Goal: Patient and family/care givers will demonstrate understanding of plan of care, disease process/condition, diagnostic tests and medications  Outcome: Not Met patient is AOx4 oriented to unit and unit routine. Patient oriented to safety precautions that are in place and how to call for assistance when needed. Patient has call light close by, bed in low position, non-skid sock in place, alarms set on bed and w/c, frequently rounded on to make sure needs are being met, will continue to educate as needed.      Problem: Infection  Goal: Patient will remain free from infection  Outcome: Progressing Pt is able to verbalize s/s of infection: redness of area, fever, pain.

## 2022-02-25 LAB
25(OH)D3 SERPL-MCNC: 11 NG/ML (ref 30–100)
ALBUMIN SERPL BCP-MCNC: 3.9 G/DL (ref 3.2–4.9)
ALBUMIN/GLOB SERPL: 1.2 G/DL
ALP SERPL-CCNC: 92 U/L (ref 30–99)
ALT SERPL-CCNC: 30 U/L (ref 2–50)
ANION GAP SERPL CALC-SCNC: 12 MMOL/L (ref 7–16)
AST SERPL-CCNC: 31 U/L (ref 12–45)
BASOPHILS # BLD AUTO: 0.4 % (ref 0–1.8)
BASOPHILS # BLD: 0.02 K/UL (ref 0–0.12)
BILIRUB SERPL-MCNC: 0.3 MG/DL (ref 0.1–1.5)
BUN SERPL-MCNC: 15 MG/DL (ref 8–22)
CALCIUM SERPL-MCNC: 9 MG/DL (ref 8.5–10.5)
CHLORIDE SERPL-SCNC: 102 MMOL/L (ref 96–112)
CO2 SERPL-SCNC: 26 MMOL/L (ref 20–33)
CREAT SERPL-MCNC: 0.64 MG/DL (ref 0.5–1.4)
EOSINOPHIL # BLD AUTO: 0.08 K/UL (ref 0–0.51)
EOSINOPHIL NFR BLD: 1.6 % (ref 0–6.9)
ERYTHROCYTE [DISTWIDTH] IN BLOOD BY AUTOMATED COUNT: 42.8 FL (ref 35.9–50)
EST. AVERAGE GLUCOSE BLD GHB EST-MCNC: 189 MG/DL
FLUAV RNA SPEC QL NAA+PROBE: NEGATIVE
FLUBV RNA SPEC QL NAA+PROBE: NEGATIVE
GLOBULIN SER CALC-MCNC: 3.3 G/DL (ref 1.9–3.5)
GLUCOSE BLD STRIP.AUTO-MCNC: 194 MG/DL (ref 65–99)
GLUCOSE BLD STRIP.AUTO-MCNC: 201 MG/DL (ref 65–99)
GLUCOSE BLD STRIP.AUTO-MCNC: 210 MG/DL (ref 65–99)
GLUCOSE BLD STRIP.AUTO-MCNC: 215 MG/DL (ref 65–99)
GLUCOSE SERPL-MCNC: 201 MG/DL (ref 65–99)
HBA1C MFR BLD: 8.2 % (ref 4–5.6)
HCT VFR BLD AUTO: 38.9 % (ref 42–52)
HGB BLD-MCNC: 13.4 G/DL (ref 14–18)
IMM GRANULOCYTES # BLD AUTO: 0.02 K/UL (ref 0–0.11)
IMM GRANULOCYTES NFR BLD AUTO: 0.4 % (ref 0–0.9)
LYMPHOCYTES # BLD AUTO: 2.08 K/UL (ref 1–4.8)
LYMPHOCYTES NFR BLD: 42.7 % (ref 22–41)
MCH RBC QN AUTO: 31.2 PG (ref 27–33)
MCHC RBC AUTO-ENTMCNC: 34.4 G/DL (ref 33.7–35.3)
MCV RBC AUTO: 90.5 FL (ref 81.4–97.8)
MONOCYTES # BLD AUTO: 0.4 K/UL (ref 0–0.85)
MONOCYTES NFR BLD AUTO: 8.2 % (ref 0–13.4)
NEUTROPHILS # BLD AUTO: 2.27 K/UL (ref 1.82–7.42)
NEUTROPHILS NFR BLD: 46.7 % (ref 44–72)
NRBC # BLD AUTO: 0 K/UL
NRBC BLD-RTO: 0 /100 WBC
PLATELET # BLD AUTO: 156 K/UL (ref 164–446)
PMV BLD AUTO: 10.7 FL (ref 9–12.9)
POTASSIUM SERPL-SCNC: 4.1 MMOL/L (ref 3.6–5.5)
PROT SERPL-MCNC: 7.2 G/DL (ref 6–8.2)
RBC # BLD AUTO: 4.3 M/UL (ref 4.7–6.1)
SARS-COV-2 RNA RESP QL NAA+PROBE: NOTDETECTED
SODIUM SERPL-SCNC: 140 MMOL/L (ref 135–145)
SPECIMEN SOURCE: NORMAL
TSH SERPL DL<=0.005 MIU/L-ACNC: 4.87 UIU/ML (ref 0.38–5.33)
WBC # BLD AUTO: 4.9 K/UL (ref 4.8–10.8)

## 2022-02-25 PROCEDURE — 92523 SPEECH SOUND LANG COMPREHEN: CPT

## 2022-02-25 PROCEDURE — 700102 HCHG RX REV CODE 250 W/ 637 OVERRIDE(OP): Performed by: PHYSICAL MEDICINE & REHABILITATION

## 2022-02-25 PROCEDURE — 97535 SELF CARE MNGMENT TRAINING: CPT

## 2022-02-25 PROCEDURE — 97166 OT EVAL MOD COMPLEX 45 MIN: CPT

## 2022-02-25 PROCEDURE — 82306 VITAMIN D 25 HYDROXY: CPT

## 2022-02-25 PROCEDURE — 84443 ASSAY THYROID STIM HORMONE: CPT

## 2022-02-25 PROCEDURE — 85025 COMPLETE CBC W/AUTO DIFF WBC: CPT

## 2022-02-25 PROCEDURE — 82962 GLUCOSE BLOOD TEST: CPT | Mod: 91

## 2022-02-25 PROCEDURE — 83036 HEMOGLOBIN GLYCOSYLATED A1C: CPT

## 2022-02-25 PROCEDURE — 80053 COMPREHEN METABOLIC PANEL: CPT

## 2022-02-25 PROCEDURE — A9270 NON-COVERED ITEM OR SERVICE: HCPCS | Performed by: PHYSICAL MEDICINE & REHABILITATION

## 2022-02-25 PROCEDURE — 99223 1ST HOSP IP/OBS HIGH 75: CPT | Performed by: HOSPITALIST

## 2022-02-25 PROCEDURE — 700111 HCHG RX REV CODE 636 W/ 250 OVERRIDE (IP): Performed by: PHYSICAL MEDICINE & REHABILITATION

## 2022-02-25 PROCEDURE — 700102 HCHG RX REV CODE 250 W/ 637 OVERRIDE(OP): Performed by: HOSPITALIST

## 2022-02-25 PROCEDURE — 99233 SBSQ HOSP IP/OBS HIGH 50: CPT | Performed by: PHYSICAL MEDICINE & REHABILITATION

## 2022-02-25 PROCEDURE — 97116 GAIT TRAINING THERAPY: CPT

## 2022-02-25 PROCEDURE — 36415 COLL VENOUS BLD VENIPUNCTURE: CPT

## 2022-02-25 PROCEDURE — 770010 HCHG ROOM/CARE - REHAB SEMI PRIVAT*

## 2022-02-25 PROCEDURE — 97162 PT EVAL MOD COMPLEX 30 MIN: CPT

## 2022-02-25 RX ORDER — VITAMIN B COMPLEX
2000 TABLET ORAL DAILY
Status: DISCONTINUED | OUTPATIENT
Start: 2022-02-25 | End: 2022-02-28

## 2022-02-25 RX ORDER — INSULIN LISPRO 100 [IU]/ML
2-12 INJECTION, SOLUTION INTRAVENOUS; SUBCUTANEOUS
Status: DISCONTINUED | OUTPATIENT
Start: 2022-02-25 | End: 2022-03-04

## 2022-02-25 RX ADMIN — CLOPIDOGREL BISULFATE 75 MG: 75 TABLET ORAL at 08:46

## 2022-02-25 RX ADMIN — OMEPRAZOLE 20 MG: 20 CAPSULE, DELAYED RELEASE ORAL at 08:46

## 2022-02-25 RX ADMIN — ATORVASTATIN CALCIUM 80 MG: 40 TABLET, FILM COATED ORAL at 08:45

## 2022-02-25 RX ADMIN — Medication 2000 UNITS: at 14:45

## 2022-02-25 RX ADMIN — METFORMIN HYDROCHLORIDE 500 MG: 500 TABLET ORAL at 08:46

## 2022-02-25 RX ADMIN — SENNOSIDES AND DOCUSATE SODIUM 2 TABLET: 50; 8.6 TABLET ORAL at 08:46

## 2022-02-25 RX ADMIN — METFORMIN HYDROCHLORIDE 1000 MG: 500 TABLET ORAL at 17:11

## 2022-02-25 RX ADMIN — METOPROLOL SUCCINATE 12.5 MG: 25 TABLET, EXTENDED RELEASE ORAL at 21:47

## 2022-02-25 RX ADMIN — GABAPENTIN 300 MG: 300 CAPSULE ORAL at 21:47

## 2022-02-25 RX ADMIN — ASPIRIN 81 MG: 81 TABLET, COATED ORAL at 08:46

## 2022-02-25 RX ADMIN — INSULIN LISPRO 4 UNITS: 100 INJECTION, SOLUTION INTRAVENOUS; SUBCUTANEOUS at 17:18

## 2022-02-25 RX ADMIN — METOPROLOL SUCCINATE 12.5 MG: 25 TABLET, EXTENDED RELEASE ORAL at 08:45

## 2022-02-25 RX ADMIN — ENOXAPARIN SODIUM 40 MG: 40 INJECTION SUBCUTANEOUS at 08:45

## 2022-02-25 RX ADMIN — INSULIN HUMAN 2 UNITS: 100 INJECTION, SOLUTION PARENTERAL at 08:46

## 2022-02-25 RX ADMIN — INSULIN HUMAN 3 UNITS: 100 INJECTION, SOLUTION PARENTERAL at 11:28

## 2022-02-25 RX ADMIN — INSULIN LISPRO 4 UNITS: 100 INJECTION, SOLUTION INTRAVENOUS; SUBCUTANEOUS at 21:50

## 2022-02-25 ASSESSMENT — PAIN DESCRIPTION - PAIN TYPE: TYPE: ACUTE PAIN

## 2022-02-25 ASSESSMENT — BRIEF INTERVIEW FOR MENTAL STATUS (BIMS)
INITIAL REPETITION OF BED BLUE SOCK - FIRST ATTEMPT: 3
ASKED TO RECALL BLUE: YES, NO CUE REQUIRED
WHAT YEAR IS IT: CORRECT
WHAT DAY OF THE WEEK IS IT: CORRECT
ASKED TO RECALL SOCK: YES, NO CUE REQUIRED
INITIAL REPETITION OF BED BLUE SOCK - FIRST ATTEMPT: 3
WHAT MONTH IS IT: ACCURATE WITHIN 5 DAYS
ASKED TO RECALL BLUE: YES, NO CUE REQUIRED
ASKED TO RECALL BED: YES, NO CUE REQUIRED
ASKED TO RECALL BED: YES, NO CUE REQUIRED
WHAT YEAR IS IT: CORRECT
WHAT MONTH IS IT: ACCURATE WITHIN 5 DAYS
BIMS SUMMARY SCORE: 15
ASKED TO RECALL SOCK: YES, NO CUE REQUIRED
WHAT DAY OF THE WEEK IS IT: CORRECT
BIMS SUMMARY SCORE: 15

## 2022-02-25 ASSESSMENT — GAIT ASSESSMENTS
DEVIATION: STEP TO;DECREASED BASE OF SUPPORT;BRADYKINETIC;DECREASED HEEL STRIKE;DECREASED TOE OFF;OTHER (COMMENT)
GAIT LEVEL OF ASSIST: MODERATE ASSIST
ASSISTIVE DEVICE: PLATFORM WALKER

## 2022-02-25 ASSESSMENT — ACTIVITIES OF DAILY LIVING (ADL)
TOILETING: INDEPENDENT
TOILET_TRANSFER_DESCRIPTION: GRAB BAR;INCREASED TIME;VERBAL CUEING;SUPERVISION FOR SAFETY
BED_CHAIR_WHEELCHAIR_TRANSFER_DESCRIPTION: INCREASED TIME;VERBAL CUEING;SUPERVISION FOR SAFETY;SET-UP OF EQUIPMENT;INITIAL PREPARATION FOR TASK
TOILET_TRANSFER_DESCRIPTION: ADAPTIVE EQUIPMENT;GRAB BAR;INCREASED TIME;SET-UP OF EQUIPMENT;SUPERVISION FOR SAFETY;VERBAL CUEING
BED_CHAIR_WHEELCHAIR_TRANSFER_DESCRIPTION: ADAPTIVE EQUIPMENT;INCREASED TIME;SET-UP OF EQUIPMENT;SUPERVISION FOR SAFETY;VERBAL CUEING

## 2022-02-25 NOTE — DISCHARGE PLANNING
"CM Initial Assessment:   Pt transferred from [facility] where he was hospitalized from 2/17 to 2/24.   Dx- Stroke: Right Body Involvement (Left Brain)  Etiologic Diagnosis: Right sided cerebral hemisphere cerebrovascular accident     Prior Level of Function:   Indep w/ mobility using a L prosthetic  Also uses wc     Prior Living Situation:    Lives w/ spouse \"Augustina\"  [facility]; [address]    Contact/ Support:    Augustina Jean spouse  H  and [phone]  She works 11 hours per days from 1200 to 2300.   No eligible for FMLA    Son Cori Jean H   C [phone]     Insurance:     [employer]     PCP:   Donny Mcnally    DME:    [facility]     DC disposition:  Goal is for pt to return home    Plan:     IDT  Anticipate home health  Follow up with pcp and [facility]   Family training .   "

## 2022-02-25 NOTE — PROGRESS NOTES
Rehab Progress Note     Encounter Date: 2/25/2022    CC: Left upper extremity weakness, decreased mobility    Interval Events (Subjective)    He complains of left shoulder weakness, no change in the last 24 hours.  He is very eager to start therapy.  He wants to start walking.    He denies any pain today, denies any phantom limb pain on the left side    No dizziness with change in position    ROS:  Gen: No fatigue, confusion, significant weight loss  Eyes: no blurry vision, double vision or pain in eyes  ENT: no changes in hearing, runny nose, nose bleeds, sinus pain  CV: No CP, palpitations,   Lungs: no shortness of breath, changes in secretions, changes in cough, pain with coughing  Abd: No abd pain, nausea, vomiting, pain with eating  : no blood in urine, pain during storage phase, bladder spasms, suprapubic pain  Ext: No swelling in legs, asymmetric atrophy  Neuro: no changes in strength or sensation  Skin: no new ulcers/skin breakdown appreciated by patient  Mood: No changes in mood today, increase in depression or anxiety  Heme: no bruising, or bleeding  Rest of 14 point review of systems is negative    Objective:  Vitals: /77   Pulse 89   Temp 36.6 °C (97.9 °F) (Temporal)   Resp 18   Ht 1.829 m (6')   Wt 75 kg (165 lb 5.5 oz)   SpO2 98%   Gen: NAD, Body mass index is 22.42 kg/m².  HEENT:  NC/AT, PERRLA, moist mucous membranes  Cardio: RRR, no mumurs  Pulm: CTAB, with normal respiratory effort  Abd: Soft NTND, active bowel sounds,   Ext: No peripheral edema. No calf tenderness. No clubbing/cyanosis. +dorsal pedalis pulses bilaterally.    Motor Exam Upper Extremities   ? Myotome R L   Shoulder flexion C5 5 1   Elbow flexion C5 5 4   Wrist extension C6 5 4   Elbow extension C7 5 4   Finger flexion C8 5 4   Finger abduction T1 5 4         Recent Results (from the past 72 hour(s))   POCT glucose device results    Collection Time: 02/22/22  5:57 PM   Result Value Ref Range    POC Glucose, Blood 200  (H) 65 - 99 mg/dL   POCT glucose device results    Collection Time: 02/22/22  8:50 PM   Result Value Ref Range    POC Glucose, Blood 253 (H) 65 - 99 mg/dL   POCT glucose device results    Collection Time: 02/23/22  8:51 AM   Result Value Ref Range    POC Glucose, Blood 181 (H) 65 - 99 mg/dL   POCT glucose device results    Collection Time: 02/23/22 12:13 PM   Result Value Ref Range    POC Glucose, Blood 177 (H) 65 - 99 mg/dL   POCT glucose device results    Collection Time: 02/23/22  4:59 PM   Result Value Ref Range    POC Glucose, Blood 226 (H) 65 - 99 mg/dL   POCT glucose device results    Collection Time: 02/23/22  9:04 PM   Result Value Ref Range    POC Glucose, Blood 232 (H) 65 - 99 mg/dL   POCT glucose device results    Collection Time: 02/24/22  8:58 AM   Result Value Ref Range    POC Glucose, Blood 199 (H) 65 - 99 mg/dL   CoV-2 and Flu A/B by PCR (24 hour In-House): Collect NP swab in [facility]    Collection Time: 02/24/22  1:20 PM    Specimen: Nasopharyngeal; Respirate   Result Value Ref Range    SARS-CoV-2 Source NP Swab    POCT glucose device results    Collection Time: 02/24/22  5:09 PM   Result Value Ref Range    POC Glucose, Blood 287 (H) 65 - 99 mg/dL   POCT glucose device results    Collection Time: 02/24/22  9:39 PM   Result Value Ref Range    POC Glucose, Blood 257 (H) 65 - 99 mg/dL   CBC with Differential    Collection Time: 02/25/22  5:53 AM   Result Value Ref Range    WBC 4.9 4.8 - 10.8 K/uL    RBC 4.30 (L) 4.70 - 6.10 M/uL    Hemoglobin 13.4 (L) 14.0 - 18.0 g/dL    Hematocrit 38.9 (L) 42.0 - 52.0 %    MCV 90.5 81.4 - 97.8 fL    MCH 31.2 27.0 - 33.0 pg    MCHC 34.4 33.7 - 35.3 g/dL    RDW 42.8 35.9 - 50.0 fL    Platelet Count 156 (L) 164 - 446 K/uL    MPV 10.7 9.0 - 12.9 fL    Neutrophils-Polys 46.70 44.00 - 72.00 %    Lymphocytes 42.70 (H) 22.00 - 41.00 %    Monocytes 8.20 0.00 - 13.40 %    Eosinophils 1.60 0.00 - 6.90 %    Basophils 0.40 0.00 - 1.80 %    Immature Granulocytes 0.40 0.00 - 0.90 %     Nucleated RBC 0.00 /100 WBC    Neutrophils (Absolute) 2.27 1.82 - 7.42 K/uL    Lymphs (Absolute) 2.08 1.00 - 4.80 K/uL    Monos (Absolute) 0.40 0.00 - 0.85 K/uL    Eos (Absolute) 0.08 0.00 - 0.51 K/uL    Baso (Absolute) 0.02 0.00 - 0.12 K/uL    Immature Granulocytes (abs) 0.02 0.00 - 0.11 K/uL    NRBC (Absolute) 0.00 K/uL   Comp Metabolic Panel (CMP)    Collection Time: 02/25/22  5:53 AM   Result Value Ref Range    Sodium 140 135 - 145 mmol/L    Potassium 4.1 3.6 - 5.5 mmol/L    Chloride 102 96 - 112 mmol/L    Co2 26 20 - 33 mmol/L    Anion Gap 12.0 7.0 - 16.0    Glucose 201 (H) 65 - 99 mg/dL    Bun 15 8 - 22 mg/dL    Creatinine 0.64 0.50 - 1.40 mg/dL    Calcium 9.0 8.5 - 10.5 mg/dL    AST(SGOT) 31 12 - 45 U/L    ALT(SGPT) 30 2 - 50 U/L    Alkaline Phosphatase 92 30 - 99 U/L    Total Bilirubin 0.3 0.1 - 1.5 mg/dL    Albumin 3.9 3.2 - 4.9 g/dL    Total Protein 7.2 6.0 - 8.2 g/dL    Globulin 3.3 1.9 - 3.5 g/dL    A-G Ratio 1.2 g/dL   HEMOGLOBIN A1C    Collection Time: 02/25/22  5:53 AM   Result Value Ref Range    Glycohemoglobin 8.2 (H) 4.0 - 5.6 %    Est Avg Glucose 189 mg/dL   TSH with Reflex to FT4    Collection Time: 02/25/22  5:53 AM   Result Value Ref Range    TSH 4.870 0.380 - 5.330 uIU/mL   Vitamin D, 25-hydroxy (blood)    Collection Time: 02/25/22  5:53 AM   Result Value Ref Range    25-Hydroxy   Vitamin D 25 11 (L) 30 - 100 ng/mL   ESTIMATED GFR    Collection Time: 02/25/22  5:53 AM   Result Value Ref Range    GFR If African American >60 >60 mL/min/1.73 m 2    GFR If Non African American >60 >60 mL/min/1.73 m 2   POCT glucose device results    Collection Time: 02/25/22  7:30 AM   Result Value Ref Range    POC Glucose, Blood 194 (H) 65 - 99 mg/dL   POCT glucose device results    Collection Time: 02/25/22 11:14 AM   Result Value Ref Range    POC Glucose, Blood 215 (H) 65 - 99 mg/dL       Current Facility-Administered Medications   Medication Frequency   • metoprolol SR (TOPROL XL) tablet 12.5 mg BID   •  Respiratory Therapy Consult Continuous RT   • hydrALAZINE (APRESOLINE) tablet 25 mg Q8HRS PRN   • acetaminophen (Tylenol) tablet 650 mg Q4HRS PRN   • senna-docusate (PERICOLACE or SENOKOT S) 8.6-50 MG per tablet 2 Tablet BID    And   • polyethylene glycol/lytes (MIRALAX) PACKET 1 Packet QDAY PRN    And   • magnesium hydroxide (MILK OF MAGNESIA) suspension 30 mL QDAY PRN    And   • bisacodyl (DULCOLAX) suppository 10 mg QDAY PRN   • omeprazole (PRILOSEC) capsule 20 mg DAILY   • artificial tears ophthalmic solution 1 Drop PRN   • benzocaine-menthol (Cepacol) lozenge 1 Lozenge Q2HRS PRN   • mag hydrox-al hydrox-simeth (MAALOX PLUS ES or MYLANTA DS) suspension 20 mL Q2HRS PRN   • ondansetron (ZOFRAN ODT) dispertab 4 mg 4X/DAY PRN    Or   • ondansetron (ZOFRAN) syringe/vial injection 4 mg 4X/DAY PRN   • traZODone (DESYREL) tablet 50 mg QHS PRN   • sodium chloride (OCEAN) 0.65 % nasal spray 2 Spray PRN   • oxyCODONE immediate-release (ROXICODONE) tablet 5 mg Q3HRS PRN    Or   • oxyCODONE immediate release (ROXICODONE) tablet 10 mg Q3HRS PRN   • aspirin EC (ECOTRIN) tablet 81 mg DAILY   • atorvastatin (LIPITOR) tablet 80 mg DAILY   • clopidogrel (PLAVIX) tablet 75 mg DAILY   • enoxaparin (LOVENOX) inj 40 mg DAILY   • gabapentin (NEURONTIN) capsule 300 mg Q EVENING   • insulin regular (HumuLIN R,NovoLIN R) injection 4X/DAY ACHS    And   • dextrose 50% (D50W) injection 50 mL Q15 MIN PRN   • metFORMIN (GLUCOPHAGE) tablet 500 mg BID WITH MEALS   • promethazine (PHENERGAN) suppository 12.5-25 mg Q4HRS PRN   • promethazine (PHENERGAN) tablet 12.5-25 mg Q4HRS PRN       Orders Placed This Encounter   Procedures   • Diet Order Diet: Consistent CHO (Diabetic); Miscellaneous modifications: (optional): Vegetarian     Standing Status:   Standing     Number of Occurrences:   1     Order Specific Question:   Diet:     Answer:   Consistent CHO (Diabetic) [4]     Order Specific Question:   Miscellaneous modifications: (optional)      Answer:   Vegetarian [13]       Assessment:  Active Hospital Problems    Diagnosis    • *Right sided cerebral hemisphere cerebrovascular accident (CVA) (HCC)    • Anemia    • Neurological deficit present    • 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)    • Type 2 diabetes mellitus with other specified complication (HCC)    • Dyslipidemia due to type 2 diabetes mellitus (HCC)    • Below-knee amputation (HCC)    • CAD (coronary artery disease)    • HTN (hypertension)    • PVD (peripheral vascular disease) ([facility])    • Uncontrolled type 2 diabetes mellitus with hyperglycemia ([facility])        Medical Decision Making and Plan:  Modified from medical plan present in Dr Bai H&P  R CVA - Patient with acute posterior internal capsule infarct with weakness UE > LE complicated by previous L BKA. Patient is improving quickly  -Continue comprehensive acute inpatient rehabilitation  -PT and OT for mobility and ADLs  -SLP For cognitive screen     HTN/CAD - Patient on ASA and Plavix. Patient on Metoprolol 12.5 mg BID  -107-130/77-87, well controlled    DM2 with hyperglycemia - Patient on SSI and Metformin 500 mg BID on transfer.   -FSBG 194-287  -Increase Metformin to 1000 mg twice daily  -Consult hospitalist for diabetes management    L BKA - Limited due to weakness and weight of prosthetic. Monitor for skin breakdown     HLD - Patient on Atorvastatin 80 mg QHS     Neuropathy - Patient on Gabapentin 300 mg QHS     Vitamin D deficiency: Vitamin D level on admission of 11  -Cholecalciferol 2000 units daily    DVT Ppx - Patient on Lovenox on transfer.   -Lovenox 40 mg daily    Infection monitoring: Evaluate for Covid with PCR test on admission  -Placed on droplet and eye isolation until cleared from Covid test.    Total time:  36 minutes.  I spent greater than 50% of the time for patient care and coordination on this date, including unit/floor time, and face-to-face time with the patient as per assessment and  plan above including vitamin D deficiency, initiate cholecalciferol, diabetes, increase Metformin, consult hospitalist, evaluation of labs.    Keshawn Epstein D.O.

## 2022-02-25 NOTE — CARE PLAN
The patient is Stable - Low risk of patient condition declining or worsening    Shift Goals  Clinical Goals: Monitor BG, pain control, safety  Patient Goals: Gain strength and endurance    Progress made toward(s) clinical / shift goals:  BG reading monitored, patient denies pain.    Patient is not progressing towards the following goals:      Problem: Bowel Elimination  Goal: Patient will participate in bowel management program  Outcome: Not Progressing  Note: Patient states it has been two days since last BM, schedule bowel medications administered as ordered.     Problem: Nutrition  Goal: Patient will display progressive weight gain toward goal have adequate food and fluid intake  Outcome: Not Progressing  Note: Patient requested second breakfast tray due to small portion sizes. Kitchen staff made aware.     Problem: Knowledge Deficit - Standard  Goal: Patient and family/care givers will demonstrate understanding of plan of care, disease process/condition, diagnostic tests and medications  Outcome: Progressing  Note: Patient understands call light system, need to wait for staff assistance and therapy schedule.

## 2022-02-25 NOTE — FLOWSHEET NOTE
02/24/22 1731   Events/Summary/Plan   Events/Summary/Plan RT assessment   Vital Signs   Pulse 82   Respiration 16   Pulse Oximetry 99 %   $ Pulse Oximetry (Spot Check) Yes   Respiratory Assessment   Respiratory Pattern Within Normal Limits   Level of Consciousness Alert   Chest Exam   Work Of Breathing / Effort Within Normal Limits   Breath Sounds   RUL Breath Sounds Clear   RML Breath Sounds Clear   RLL Breath Sounds Clear   [place] Breath Sounds Clear   LLL Breath Sounds Clear   Secretions   Cough Dry;Non Productive;Strong   Oxygen   O2 Delivery Device None - Room Air   Smoking History   Have you ever smoked Never

## 2022-02-25 NOTE — CONSULTS
DATE OF SERVICE:  2/25/2021    REQUESTING PHYSICIAN:  Keshawn Epstein DO    CHIEF COMPLAINT / REASON FOR CONSULTATION:   Hypertension  Diabetes    HISTORY OF PRESENT ILLNESS:  This is a 57 y/o male with a PMH significant for hypertension, type 2 diabetes, CAD, history of left frontal stroke in 2018, and a left BKA in 12/2019 who presented on 2/17/2022 with left-sided weakness, left facial droop.  Patient was evaluated by neurology on arrival to [facility], found to have an NIH score of 2 for left facial droop and left arm weakness.  Patient has been compliant with aspirin and Plavix.  Patient did not require TPA.  CT head showed no acute abnormalities, CT perfusion showed no mismatch.  MRI brain found acute infarct in the right posterior limb of internal capsule adjacent to the basal ganglia.  MRI of C-spine was also pursued which found mild cervical degenerative changes.      Because of the patient's weakness and debility, Rehab was consulted, evaluated the patient, and was deemed a good Rehab candidate.  The patient was transferred over to the Rehab facility on 2/24/2021.      The patient denies fever, chills, nausea, vomiting, headaches, blurry vision, or chest pain.    REVIEW OF SYSTEMS: All review of systems are negative pre AMA and CMS criteria except for that stated in the HPI.    PAST MEDICAL HISTORY:  Past Medical History:   Diagnosis Date   • CAD (coronary artery disease)    • Diabetes (HCC)    • Hyperlipidemia    • Hypertension        PAST SURGICAL HISTORY:  Past Surgical History:   Procedure Laterality Date   • KNEE AMPUTATION BELOW Left 12/20/2019    Procedure: AMPUTATION, BELOW KNEE;  Surgeon: Koby Zhao M.D.;  Location: SURGERY [facility];  Service: Orthopedics   • ZZZ CARDIAC CATH  12/16/2019    multi-vessel disease   • IRRIGATION & DEBRIDEMENT ORTHO Left 6/24/2019    Procedure: IRRIGATION AND DEBRIDEMENT, WOUND-FOOT, BIOLOGIC PLACEMENT, WOUND VAC PLACEMENT;  Surgeon: Charles Chopra M.D.;   Location: SURG[facility];  Service: Orthopedics       No Known Allergies    CURRENT MEDICATIONS:    Current Facility-Administered Medications:   •  vitamin D3  •  metFORMIN  •  metoprolol SR  •  Respiratory Therapy Consult  •  hydrALAZINE  •  acetaminophen  •  senna-docusate **AND** polyethylene glycol/lytes **AND** magnesium hydroxide **AND** bisacodyl  •  omeprazole  •  artificial tears  •  benzocaine-menthol  •  mag hydrox-al hydrox-simeth  •  ondansetron **OR** ondansetron  •  traZODone  •  sodium chloride  •  oxyCODONE immediate-release **OR** oxyCODONE immediate-release  •  aspirin  •  atorvastatin  •  clopidogrel  •  enoxaparin  •  gabapentin  •  insulin regular **AND** POC blood glucose manual result **AND** NOTIFY MD and PharmD **AND** Administer 20 grams of glucose (approximately 8 ounces of fruit juice) every 15 minutes PRN FSBG less than 70 mg/dL **AND** dextrose 50%  •  promethazine  •  promethazine    Social History     Socioeconomic History   • Marital status:    Tobacco Use   • Smoking status: Never Smoker   • Smokeless tobacco: Never Used   Vaping Use   • Vaping Use: Never used   Substance and Sexual Activity   • Alcohol use: No   • Drug use: No       FAMILY HISTORY:  was reviewed and is not pertinent to this consultation.    PHYSICAL EXAMINATION:  VITAL SIGNS:  Temp is 97.7, blood pressure is 124/79, heart rate is 92, respiratory rate is 18.  GENERAL:  Patient was lying in bed in no distress.  HEENT:  Pupils were equal, round and reactive to light and accomodation.  Oral mucosa was pink and moist.  NECK:  Soft.  Supple.  No JVD.  HEART:  Regular rate and rhythm.  Normal S1 and S2.  No murmurs were appreciated.  LUNGS:  Are clear to auscultation bilaterally.  ABDOMEN:  Soft, non tender, non distended.  Bowels sound were positive in all four quadrants.  EXTREMITIES:  No clubbing, cyanosis.  There was no lower extremity edema.  NEUROLOGIC:  Cranial nerves two through twelve were  grossly intact.    LABS:  Lab Results   Component Value Date/Time    SODIUM 140 02/25/2022 05:53 AM    POTASSIUM 4.1 02/25/2022 05:53 AM    CHLORIDE 102 02/25/2022 05:53 AM    CO2 26 02/25/2022 05:53 AM    GLUCOSE 201 (H) 02/25/2022 05:53 AM    BUN 15 02/25/2022 05:53 AM    CREATININE 0.64 02/25/2022 05:53 AM    CREATININE 0.9 05/09/2007 01:20 AM    BUNCREATRAT 14 11/10/2021 06:52 AM      Lab Results   Component Value Date/Time    WBC 4.9 02/25/2022 05:53 AM    RBC 4.30 (L) 02/25/2022 05:53 AM    HEMOGLOBIN 13.4 (L) 02/25/2022 05:53 AM    HEMATOCRIT 38.9 (L) 02/25/2022 05:53 AM    MCV 90.5 02/25/2022 05:53 AM    MCH 31.2 02/25/2022 05:53 AM    MCHC 34.4 02/25/2022 05:53 AM    MPV 10.7 02/25/2022 05:53 AM    NEUTSPOLYS 46.70 02/25/2022 05:53 AM    LYMPHOCYTES 42.70 (H) 02/25/2022 05:53 AM    MONOCYTES 8.20 02/25/2022 05:53 AM    EOSINOPHILS 1.60 02/25/2022 05:53 AM    BASOPHILS 0.40 02/25/2022 05:53 AM    ANISOCYTOSIS 1+ 12/20/2019 03:00 AM      Lab Results   Component Value Date/Time    PROTHROMBTM 13.4 02/17/2022 09:46 AM    INR 1.05 02/17/2022 09:46 AM        Below-knee amputation (HCC)  Hx of left BKA in 2019  2nd to diabetes and PAD    HTN (hypertension)  BP ok  On Toprol XL: 12.5 mg bid  Monitor    Right sided cerebral hemisphere cerebrovascular accident (CVA) (HCC)  MRI brain: showed acute infarct in the right posterior limb of internal capsule adjacent to the basal ganglia.  On ASA and Plavix  On Lipitor    Uncontrolled type 2 diabetes mellitus with hyperglycemia (HCC)  Hba1c: 8.2 (2/25) -- has been this elevated for a while  BS: 177-287  On Metformin: 500 mg bid --> 1000 mg bid (2/25)  Note: home meds include Metformin 500 mg bid and Glipizide 2.5 mg bid  Cont to monitor    Vitamin D deficiency  Vit D: 11  On supplements      This case has been discussed with the attending Physiatrist.    Thank you for the consultation.  Will follow the patient with you.

## 2022-02-25 NOTE — ASSESSMENT & PLAN NOTE
HbA1c 8.2  Continue Metformin and Glipizide per outpt regimen  Discontinue FSBS and SSI as not routinely needing insulin coverage  Outpt meds include Metformin 500 mg bid and Glipizide 2.5 mg bid

## 2022-02-25 NOTE — FLOWSHEET NOTE
02/24/22 1736   Patient History   Pulmonary Diagnosis None   Procedures Relevant to Respiratory Status None   Home O2 No   Nocturnal CPAP No   Home Treatments/Frequency No   Protocol Pathways   Protocol Pathways None

## 2022-02-25 NOTE — CARE PLAN
The patient is Watcher - Medium risk of patient condition declining or worsening    Shift Goals  Clinical Goals: safety  Patient Goals: strength    Progress made toward(s) clinical / shift goals:    Problem: Knowledge Deficit - Standard  Goal: Patient and family/care givers will demonstrate understanding of plan of care, disease process/condition, diagnostic tests and medications  Note: Patient refused Metoprolol. He states his BP tends to be on the low side. BP is 126/79 HR 95. Explained indications of this medicine, parameters, pt still refused.      Problem: Bladder / Voiding  Goal: Patient will establish and maintain regular urinary output  Outcome: Progressing     Problem: Skin Integrity  Goal: Patient's skin integrity will be maintained or improve  Note: Patient's skin remains intact and free from new or accidental injury this shift.  Will continue to monitor.

## 2022-02-25 NOTE — THERAPY
"Occupational Therapy   Initial Evaluation     Patient Name: Israel Aviles  Age:  56 y.o., Sex:  male  Medical Record #: [id number]  Today's Date: 2/25/2022     Subjective  Pt agreeable to OT session     Objective       02/25/22 0901   Prior Living Situation   Prior Services Home-Independent   Housing / Facility 1 Story House   Steps Into Home 0   Steps In Home 0   Bathroom Set up Walk In Shower;Shower Chair   Equipment Owned Wheelchair;Hand Held Shower;Tub / Shower Seat   Lives with - Patient's Self Care Capacity Spouse   Prior Level of ADL Function   Self Feeding Independent   Grooming / Hygiene Independent   Bathing Independent   Dressing Independent   Toileting Independent   Prior Level of IADL Function   Medication Management Independent   Laundry Independent   Kitchen Mobility Independent   Finances Independent   Home Management Independent   Shopping Independent   Prior Level Of Mobility Independent Without Device in Community;Independent Without Device in Home   Occupation (Pre-Hospital Vocational) Employed Full Time   Prior Functioning: Everyday Activities   Self Care Independent   Indoor Mobility (Ambulation) Independent   Functional Cognition Independent   Cognition    Level of Consciousness Alert   ABS (Agitated Behavior Scale)   Agitated Behavior Scale Performed No   Cognitive Pattern Assessment   Cognitive Pattern Assessment Used BIMS   Brief Interview for Mental Status (BIMS)   Repetition of Three Words (First Attempt) 3   Temporal Orientation: Year Correct   Temporal Orientation: Month Accurate within 5 days   Temporal Orientation: Day Correct   Recall: \"Sock\" Yes, no cue required   Recall: \"Blue\" Yes, no cue required   Recall: \"Bed\" Yes, no cue required   BIMS Summary Score 15   Vision Screen   Vision Not tested   Passive ROM Upper Body   Passive ROM Upper Body WDL   Active ROM Upper Body   Active ROM Upper Body  X   Dominant Hand Right   Comments Impaired LUE   Strength Upper Body   Upper Body " Strength  X   Comments impaired LUE   Sensation Upper Body   Comments needs further assessment   Upper Body Muscle Tone   Upper Body Muscle Tone  WDL   Balance Assessment   Sitting Balance (Static) Good   Sitting Balance (Dynamic) Good   Standing Balance (Static) Poor   Standing Balance (Dynamic) Poor   Weight Shift Sitting Fair   Weight Shift Standing Poor   Bed Mobility    Supine to Sit Moderate Assist   Sit to Stand Minimal Assist   Coordination Upper Body   Coordination X   Fine Motor Coordination impaired LUE   Gross Motor Coordination impaired LUE   Eating   Assistance Needed Set-up / clean-up   Physical Assistance Level No physical assistance   CARE Score - Eating 5   Eating Discharge Goal   Discharge Goal 6   Oral Hygiene   Assistance Needed Set-up / clean-up   Physical Assistance Level No physical assistance   CARE Score - Oral Hygiene 5   Oral Hygiene Discharge Goal   Discharge Goal 6   Shower/Bathe Self   Assistance Needed Physical assistance   Physical Assistance Level 25% or less   CARE Score - Shower/Bathe Self 3   Shower/Bathe Self Discharge Goal   Discharge Goal 4   Upper Body Dressing   Assistance Needed Physical assistance   Physical Assistance Level 25% or less   CARE Score - Upper Body Dressing 3   Upper Body Dressing Discharge Goal   Discharge Goal 6   Lower Body Dressing   Assistance Needed Physical assistance   Physical Assistance Level 51%-75%   CARE Score - Lower Body Dressing 2   Lower Body Dressing Discharge Goal   Discharge Goal 4   Putting On/Taking Off Footwear   Assistance Needed Physical assistance   Physical Assistance Level 51%-75%   CARE Score - Putting On/Taking Off Footwear 2   Putting On/Taking Off Footwear Discharge Goal   Discharge Goal 6   Toileting Hygiene   Assistance Needed Physical assistance   Physical Assistance Level 26%-50%   CARE Score - Toileting Hygiene 3   Toileting Hygiene Discharge Goal   Discharge Goal 6   Toilet Transfer   Assistance Needed Physical assistance    Physical Assistance Level 26%-50%   CARE Score - Toilet Transfer 3   Toilet Transfer Discharge Goal   Discharge Goal 6   Hearing, Speech, and Vision   Expression of Ideas and Wants Without difficulty   Understanding Verbal and Non-Verbal Content Understands   Functional Level of Assist   Eating Supervision   Eating Description Set-up of equipment or meal/tube feeding;Supervision for safety   Grooming Supervision;Seated   Grooming Description Set-up of equipment;Supervision for safety   Bathing Minimal Assist   Bathing Description Tub bench;Assit with back;Increased time;Initial preparation for task;Set-up of equipment   Upper Body Dressing Minimal Assist   Upper Body Dressing Description Increased time;Initial preparation for task;Supervision for safety;Set-up of equipment   Lower Body Dressing Maximal Assist   Lower Body Dressing Description Increased time;Application of orthotic or brace;Assist with threading into pant leg;Supervision for safety;Verbal cueing   Toileting Moderate Assist   Toileting Description Assist to pull pants down;Assist for standing balance;Assist to pull pants up;Grab bar;Supervision for safety;Set-up of equipment   Bed, Chair, Wheelchair Transfer Moderate Assist   Bed Chair Wheelchair Transfer Description Increased time;Verbal cueing;Supervision for safety;Set-up of equipment;Initial preparation for task   Toilet Transfers Moderate Assist   Toilet Transfer Description Grab bar;Increased time;Verbal cueing;Supervision for safety   Tub / Shower Transfers Moderate Assist   Tub Shower Transfer Description Grab bar;Shower bench;Verbal cueing;Supervision for safety;Set-up of equipment   Problem List   Problem List Decreased Active Daily Living Skills;Decreased Homemaking Skills;Decreased Functional Mobility;Impaired Cognitive Function;Impaired Postural Control / Balance;Limited Knowledge of Post Op Precautions;Impaired Coordination Left Upper Extremity;Decreased Upper Extremity AROM  Left;Decreased Upper Extremity PROM Left   Precautions   Precautions Fall Risk   Comments L genie, L BKA (2019)   Current Discharge Plan   Current Discharge Plan Return to Prior Living Situation   Benefit    Therapy Benefit Patient Would Benefit from Inpatient Rehab Occupational Therapy to Maximize [place] with ADLs, IADLs and Functional Mobility.   Interdisciplinary Plan of Care Collaboration   IDT Collaboration with  Physical Therapist   Patient Position at End of Therapy Seated;Phone within Reach;Tray Table within Reach;Call Light within Reach   Collaboration Comments CLOF   Equipment Needs   Assistive Device / DME Grab Bars In Shower / Tub;Grab Bars By Toilet   Adaptive Equipment Reacher   Strengths & Barriers   Strengths Supportive family;Willingly participates in therapeutic activities;Pleasant and cooperative;Motivated for self care and independence;Good endurance;Good balance;Effective communication skills;Able to follow instructions   Barriers Decreased endurance;Hemiparesis;Limited mobility;Impaired balance   OT Total Time Spent   OT Individual Total Time Spent (Mins) 60   OT Charge Group   OT Self Care / ADL 1   OT Evaluation OT Evaluation Mod       Assessment  Patient is 56 y.o. male with a diagnosis of R CVA w/ L sided weakness. Pt has past medical history of hypertension, hyperlipidemia type 2 diabetes, coronary artery disease, history of left frontal stroke in 2018, Left BKA in 12/2019. Pt was a [facility] at [facility] post L BKA.    During OT eval pt presented with L sided weakness UE>LE, impaired balance, and decreased endurance. Pt required supervision to mod A to complete all ADLs during session. [facility]     Plan  Recommend Occupational Therapy  minutes per day 5-7 days per week for 2-3 weeks for the following treatments:  OT Orthotics Training, OT E Stim Attended, OT Group Therapy, OT Self Care/ADL, OT Cognitive Skill Dev, OT  Community Reintegration, OT Manual Ther Technique, OT Neuro Re-Ed/Balance, OT Sensory Int Techniques, OT Evaluation and OT Therapeutic Exercise.    Passport items to be completed:  Perform bathroom transfers, complete dressing, complete feeding, get ready for the day, prepare a simple meal, participate in household tasks, adapt home for safety needs, demonstrate home exercise program, complete caregiver training     Goals:  Long term and short term goals have been discussed with patient and they are in agreement.    Occupational Therapy Goals (Active)       Problem: Bathing       Dates: Start: 02/25/22         Goal: STG-Within one week, patient will bathe with supervision.       Dates: Start: 02/25/22               Problem: Dressing       Dates: Start: 02/25/22         Goal: STG-Within one week, patient will dress LB including prosthetic w/ min A.        Dates: Start: 02/25/22               Problem: Functional Transfers       Dates: Start: 02/25/22         Goal: STG-Within one week, patient will transfer to step in shower with CGA.       Dates: Start: 02/25/22               Problem: OT Long Term Goals       Dates: Start: 02/25/22         Goal: LTG-By discharge, patient will complete basic self care tasks with mod I to supervision.        Dates: Start: 02/25/22            Goal: LTG-By discharge, patient will perform bathroom transfers with mod I to supervision.        Dates: Start: 02/25/22

## 2022-02-26 LAB
GLUCOSE BLD STRIP.AUTO-MCNC: 165 MG/DL (ref 65–99)
GLUCOSE BLD STRIP.AUTO-MCNC: 181 MG/DL (ref 65–99)
GLUCOSE BLD STRIP.AUTO-MCNC: 232 MG/DL (ref 65–99)
GLUCOSE BLD STRIP.AUTO-MCNC: 252 MG/DL (ref 65–99)

## 2022-02-26 PROCEDURE — 700111 HCHG RX REV CODE 636 W/ 250 OVERRIDE (IP): Performed by: PHYSICAL MEDICINE & REHABILITATION

## 2022-02-26 PROCEDURE — 82962 GLUCOSE BLOOD TEST: CPT | Mod: 91

## 2022-02-26 PROCEDURE — 700102 HCHG RX REV CODE 250 W/ 637 OVERRIDE(OP): Performed by: PHYSICAL MEDICINE & REHABILITATION

## 2022-02-26 PROCEDURE — A9270 NON-COVERED ITEM OR SERVICE: HCPCS | Performed by: PHYSICAL MEDICINE & REHABILITATION

## 2022-02-26 PROCEDURE — 97530 THERAPEUTIC ACTIVITIES: CPT

## 2022-02-26 PROCEDURE — 97110 THERAPEUTIC EXERCISES: CPT

## 2022-02-26 PROCEDURE — 97116 GAIT TRAINING THERAPY: CPT

## 2022-02-26 PROCEDURE — 99232 SBSQ HOSP IP/OBS MODERATE 35: CPT | Performed by: HOSPITALIST

## 2022-02-26 PROCEDURE — 770010 HCHG ROOM/CARE - REHAB SEMI PRIVAT*

## 2022-02-26 RX ADMIN — METFORMIN HYDROCHLORIDE 1000 MG: 500 TABLET ORAL at 08:12

## 2022-02-26 RX ADMIN — OMEPRAZOLE 20 MG: 20 CAPSULE, DELAYED RELEASE ORAL at 08:12

## 2022-02-26 RX ADMIN — INSULIN LISPRO 2 UNITS: 100 INJECTION, SOLUTION INTRAVENOUS; SUBCUTANEOUS at 11:06

## 2022-02-26 RX ADMIN — METOPROLOL SUCCINATE 12.5 MG: 25 TABLET, EXTENDED RELEASE ORAL at 20:45

## 2022-02-26 RX ADMIN — INSULIN LISPRO 6 UNITS: 100 INJECTION, SOLUTION INTRAVENOUS; SUBCUTANEOUS at 17:21

## 2022-02-26 RX ADMIN — INSULIN LISPRO 2 UNITS: 100 INJECTION, SOLUTION INTRAVENOUS; SUBCUTANEOUS at 20:42

## 2022-02-26 RX ADMIN — ASPIRIN 81 MG: 81 TABLET, COATED ORAL at 08:12

## 2022-02-26 RX ADMIN — ENOXAPARIN SODIUM 40 MG: 40 INJECTION SUBCUTANEOUS at 08:13

## 2022-02-26 RX ADMIN — ATORVASTATIN CALCIUM 80 MG: 40 TABLET, FILM COATED ORAL at 08:12

## 2022-02-26 RX ADMIN — Medication 2000 UNITS: at 08:12

## 2022-02-26 RX ADMIN — METOPROLOL SUCCINATE 12.5 MG: 25 TABLET, EXTENDED RELEASE ORAL at 08:13

## 2022-02-26 RX ADMIN — SENNOSIDES AND DOCUSATE SODIUM 2 TABLET: 50; 8.6 TABLET ORAL at 20:45

## 2022-02-26 RX ADMIN — GABAPENTIN 300 MG: 300 CAPSULE ORAL at 20:45

## 2022-02-26 RX ADMIN — CLOPIDOGREL BISULFATE 75 MG: 75 TABLET ORAL at 08:12

## 2022-02-26 RX ADMIN — METFORMIN HYDROCHLORIDE 1000 MG: 500 TABLET ORAL at 17:19

## 2022-02-26 RX ADMIN — INSULIN LISPRO 4 UNITS: 100 INJECTION, SOLUTION INTRAVENOUS; SUBCUTANEOUS at 08:09

## 2022-02-26 ASSESSMENT — GAIT ASSESSMENTS
ASSISTIVE DEVICE: PLATFORM WALKER
DISTANCE (FEET): 125
DEVIATION: STEP TO;SHUFFLED GAIT;DECREASED HEEL STRIKE;DECREASED TOE OFF
DEVIATION: STEP TO;SHUFFLED GAIT;DECREASED HEEL STRIKE;DECREASED TOE OFF
GAIT LEVEL OF ASSIST: MINIMAL ASSIST
ASSISTIVE DEVICE: PLATFORM WALKER
GAIT LEVEL OF ASSIST: MINIMAL ASSIST

## 2022-02-26 ASSESSMENT — ACTIVITIES OF DAILY LIVING (ADL)
BED_CHAIR_WHEELCHAIR_TRANSFER_DESCRIPTION: ADAPTIVE EQUIPMENT;INCREASED TIME;SET-UP OF EQUIPMENT;SUPERVISION FOR SAFETY
TOILET_TRANSFER_DESCRIPTION: ADAPTIVE EQUIPMENT;GRAB BAR;INCREASED TIME;SET-UP OF EQUIPMENT;SUPERVISION FOR SAFETY;VERBAL CUEING

## 2022-02-26 ASSESSMENT — ENCOUNTER SYMPTOMS
PALPITATIONS: 0
DIZZINESS: 0
FEVER: 0
NAUSEA: 0
HALLUCINATIONS: 0
SHORTNESS OF BREATH: 0
HEADACHES: 0
VOMITING: 0
BLURRED VISION: 0

## 2022-02-26 ASSESSMENT — PAIN DESCRIPTION - PAIN TYPE: TYPE: ACUTE PAIN

## 2022-02-26 NOTE — THERAPY
"Speech Language Pathology   Initial Assessment     Patient Name: Israel Aviles  AGE:  56 y.o., SEX:  male  Medical Record #: [id number]  Today's Date: 2/25/2022     Subjective    Pt pleasant and cooperative during evaluation.      Objective       02/25/22 1501   Prior Living Situation   Prior Services Home-Independent   Housing / Facility 1 Story House   Lives with - Patient's Self Care Capacity Spouse   Prior Level Of Function   Communication Within Functional Limits   Swallow Within Functional Limits   Dentition Intact   Dentures None   Hearing Within Functional Limits for Evaluation   Hearing Aid None   Vision Reading ;Wears Corrective Lenses  (not present)   Patient's Primary Language English   Education Completed College   Occupation (Pre-Hospital Vocational) Employed Full Time   Comments owner of gas station.   Receptive Language / Auditory Comprehension   Receptive Language / Auditory Comprehension WDL   Expressive Language   Naming Within Functional Limits (6-7)   Word Finding Deficits Minimal (4)   Reading Comprehension    Reading Words Within Functional Limits (6-7)   Reading Short Paragraphs  Minimal (4)   Written Language Expression   Dominant Hand Right   Cognition   Attention to Task Within Functional Limits (6-7)   Verbal Short Term Memory 10 Minutes   Visual Short Term Memory Minimal (4)   Written Sequencing Minimal (4)   Auditory Math Within Functional Limits (6-7)   Clock Drawing Impaired Hand Placement   ABS (Agitated Behavior Scale)   Agitated Behavior Scale Performed No   Cognitive Pattern Assessment   Cognitive Pattern Assessment Used BIMS   Brief Interview for Mental Status (BIMS)   Repetition of Three Words (First Attempt) 3   Temporal Orientation: Year Correct   Temporal Orientation: Month Accurate within 5 days   Temporal Orientation: Day Correct   Recall: \"Sock\" Yes, no cue required   Recall: \"Blue\" Yes, no cue required   Recall: \"Bed\" Yes, no cue required   BIMS Summary Score 15 "   Social / Pragmatic Communication   Social / Pragmatic Communication WDL   Tracheostomy   Tracheostomy No   Speech Mechanisms / Voice Production   Speech Mechanisms / Voice Production (WDL) WDL   Labial Function   Labial Function (WDL) WDL   Lingual Function   Lingual Function (WDL) WDL   Swallowing/Nutritional Status   Swallowing/Nutritional Status Regular food   Functional Level of Assist   Eating Supervision   Eating Description Set-up of equipment or meal/tube feeding   Comprehension Modified Independent   Comprehension Description Glasses   Expression Supervision   Expression Description Verbal cueing   Social Interaction Independent   Problem Solving Minimal Assist   Problem Solving Description Seat belt;Supervision;Verbal cueing;Increased time;Bed/chair alarm   Memory Minimal Assist   Memory Description Increased time;Bed/chair alarm;Seat belt;Therapy schedule;Verbal cueing   Outcome Measures   Outcome Measures Utilized SCCAN   SCCAN (Scales of Cognitive and Communicative Ability for Neurorehabilitation)   Oral Expression - Raw Score 16   Oral Expression - Scale Performance Score 84   Orientation - Raw Score 12   Orientation - Scale Performance Score 100   Memory - Raw Score 11   Memory - Scale Performance Score 58   Speech Comprehension - Raw Score 12   Speech Comprehension - Scale Performance Score 92   Reading Comprehension - Raw Score 9   Reading Comprehension - Scale Performance Score 75   Writing - Raw Score 7   Writing - Scale Performance Score 100   Attention - Raw Score 11   Attention - Scale Performance Score 69   Problem Solving - Raw Score 17   Problem Solving - Scale Performance Score 74   SCCAN Total Raw Score 76   SCCAN Degree of Severity Mild Impairment   Problem List   Problem List Cognitive-Linguistic Deficits   Current Discharge Plan   Current Discharge Plan Return to Prior Living Situation   Benefit   Therapy Benefit Patient would benefit from Inpatient Rehab Speech-Language Pathology to  address above identified deficits.   Interdisciplinary Plan of Care Collaboration   IDT Collaboration with  Physician   Collaboration Comments SHAY   Strengths & Barriers   Strengths Able to follow instructions;Alert and oriented;Good insight into deficits/needs;Pleasant and cooperative;Willingly participates in therapeutic activities   Barriers Impaired functional cognition   Speech Language Pathologist Assigned   Assigned SLP / Extension CL/MP 60 cog (SPLIT OK)   SLP Total Time Spent   SLP Individual Total Time Spent (Mins) 60   Evaluation Charges   SLP Speech Language Evaluation Speech Sound Language Comprehension       Assessment    Patient is 56 y.o. male with a diagnosis of R CVA.  Additional factors influencing patient status/progress (ie: cognitive factors, co-morbidities, social support, etc): SCCAN completed this day.  Pt scored 76/94, characteristic of mild cognitive deficits.  Pt presented with greatest deficits in attn, problem solving, and short term memory.  Recommend skilled speech therapy to target aforementioned deficits.      Plan  Recommend Speech Therapy 30-60 minutes per day 5-6 days per week for 4 weeks for the following treatments:  SLP Aphasia Evaluation, SLP Cognitive Skill Development, and SLP Group Treatment.    Passport items to be completed:  [unfilled]    Goals:  Long term and short term goals have been discussed with patient and they are in agreement.    Speech Therapy Problems (Active)       Problem: Memory STGs       Dates: Start: 02/25/22         Goal: STG-Within one week, patient will recall daily events and safety strategies with 80% accuracy given min verbal cues and use of memory book.        Dates: Start: 02/25/22               Problem: Problem Solving STGs       Dates: Start: 02/25/22         Goal: STG-Within one week, patient will complete executive function tasks with 80% accuracy given min verbal cues.         Dates: Start: 02/25/22            Goal: STG-Within one week,  patient will alternating attn tasks 80% given min verbal cues.         Dates: Start: 02/25/22               Problem: Speech/Swallowing LTGs       Dates: Start: 02/25/22         Goal: LTG-By discharge, patient will solve complex problem Zac for safe discharge home.       Dates: Start: 02/25/22

## 2022-02-26 NOTE — PROGRESS NOTES
[facility] Medicine Daily Progress Note    Date of Service  2/26/2022    Chief Complaint:  Hypertension  Diabetes    Interval History:  Discussed with pt about his BS and have increased hs med and will cont to monitor.    Review of Systems  Review of Systems   Constitutional: Negative for fever.   Eyes: Negative for blurred vision.   Respiratory: Negative for shortness of breath.    Cardiovascular: Negative for palpitations.   Gastrointestinal: Negative for nausea and vomiting.   Neurological: Negative for dizziness and headaches.   Psychiatric/Behavioral: Negative for hallucinations.        Physical Exam  Temp:  [36.1 °C (97 °F)-36.5 °C (97.7 °F)] 36.1 °C (97 °F)  Pulse:  [78-92] 78  Resp:  [18] 18  BP: (115-124)/(74-79) 118/79  SpO2:  [96 %] 96 %    Physical Exam  Vitals and nursing note reviewed.   Constitutional:       General: He is not in acute distress.  HENT:      Mouth/Throat:      Mouth: Mucous membranes are moist.      Pharynx: Oropharynx is clear.   Eyes:      General: No scleral icterus.  Cardiovascular:      Rate and Rhythm: Normal rate and regular rhythm.   Pulmonary:      Effort: Pulmonary effort is normal.      Breath sounds: No wheezing or rales.   Abdominal:      General: Bowel sounds are normal.      Palpations: Abdomen is soft.   Musculoskeletal:      Cervical back: No rigidity.      Right lower leg: No edema.      Left lower leg: No edema.   Skin:     General: Skin is warm and dry.   Neurological:      Mental Status: He is alert and oriented to person, place, and time.   Psychiatric:         Mood and Affect: Mood normal.         Behavior: Behavior normal.         Fluids    Intake/Output Summary (Last 24 hours) at 2/26/2022 1011  Last data filed at 2/26/2022 0800  Gross per 24 hour   Intake 1140 ml   Output --   Net 1140 ml       Laboratory  Recent Labs     02/25/22  0553   WBC 4.9   RBC 4.30*   HEMOGLOBIN 13.4*   HEMATOCRIT 38.9*   MCV 90.5   MCH 31.2   MCHC 34.4   RDW 42.8   PLATELETCT 156*    MPV 10.7     Recent Labs     02/25/22  0553   SODIUM 140   POTASSIUM 4.1   CHLORIDE 102   CO2 26   GLUCOSE 201*   BUN 15   CREATININE 0.64   CALCIUM 9.0                   Imaging    Assessment/Plan  * Right sided cerebral hemisphere cerebrovascular accident (CVA) (HCC)- (present on admission)  Assessment & Plan  MRI brain: showed acute infarct in the right posterior limb of internal capsule adjacent to the basal ganglia.  On ASA and Plavix  On Lipitor    Below-knee amputation (HCC)- (present on admission)  Assessment & Plan  Hx of left BKA in 2019  2nd to diabetes and PAD    HTN (hypertension)- (present on admission)  Assessment & Plan  BP ok  On Toprol XL: 12.5 mg bid  Monitor    Vitamin D deficiency  Assessment & Plan  Vit D: 11  On supplements    Uncontrolled type 2 diabetes mellitus with hyperglycemia (HCC)- (present on admission)  Assessment & Plan  Hba1c: 8.2 (2/25) -- has been this elevated for a while  BS: low 200's  On Metformin: 500 mg bid --> 1000 mg bid (2/25)  Note: home meds include Metformin 500 mg bid and Glipizide 2.5 mg bid  Will monitor another day since meds were recently adjusted

## 2022-02-26 NOTE — THERAPY
HOSPITALIST PROGRESS NOTE      Patient: Meredith Willett Date: 10/8/2019   YOB: 1932 Admission Date: 9/29/2019   MRN: [id number] Attending: Nirmal Barbosa MD     Chief Complaint:  The patient is a 87 year old female with UTI    Hospital course:    Meredith Willett is a 87 year old female with history of dementia (A&Ox1 at baseline), hypertension, hyperlipidemia, diabetes mellitus, hypothyroidism, recurrent UTI and vagina candidiasis presented to ED with generalized weakness and lethargy. HEENT, patient was noted to have fever with urinary tract infection. Patient was started on IV Zosyn, ID consulted. Urine culture results with increasingly resistant E coli and patient was started on meropenem. Urology was consulted for recurrent urinary tract infection. Vyas was placed. And later Pt had suprapubic catheter placement by IR, which she tolerated well.     Pt had Superficial chest wall wound from EKG pad removal. Healing was impaired due to patient's frequent picking and scratching. Therapy was consulted. Wound needs to keep covered with Allevyns, change qod. Nails should be cut short and maintained so.  If she continues to scratch these wounds will likely not heal.    Subjective: denies SOB or chest pain    PROBLEM LIST    Patient Active Problem List   Diagnosis   • Essential hypertension   • Mixed hyperlipidemia   • OA (osteoarthritis)   • Macular degeneration   • B12 deficiency   • Vitamin D deficiency   • Type II or unspecified type diabetes mellitus without mention of complication, not stated as uncontrolled   • Hallucinations   • Family history of brain aneurysm   • Pruritic rash   • Thyroid activity decreased   • Dementia associated with other underlying disease with behavioral disturbance (CMS/HCC)   • Recurrent UTI   • Late onset Alzheimer's disease with behavioral disturbance (CMS/HCC)   • Bladder infection   • Urinary tract infection without hematuria   • Generalized weakness   • Pleural effusion   •  Physical Therapy   Initial Evaluation     Patient Name: Israel Aviles  Age:  56 y.o., Sex:  male  Medical Record #: [id number]  Today's Date: 2/25/2022     Subjective    Patient agreeable to PT. Pt would like to be IND within his home at d/c.     Objective       02/25/22 1331   Prior Living Situation   Prior Services None   Housing / Facility 1 Story Apartment / Condo   Steps Into Home 1   Steps In Home 0   Rail None   Bathroom Set up Walk In Shower;Shower Glass Doors;Grab Bars;Shower Chair   Equipment Owned Tub / Shower Seat;Grab Bar(s) In Tub / Shower;Grab Bar(s) By Toilet;Other (Comments)  (L BKA prosthetic)   Lives with - Patient's Self Care Capacity Spouse   Comments Pt reports IND PLOF with L BKA prosthetic only, able to walk 1-2 miles, driving IND, and working full-time at [employer].   Prior Level of Functional Mobility   Bed Mobility Independent   Transfer Status Independent   Ambulation Independent   Distance Ambulation (Feet)   (1-2 miles)   Assistive Devices Used   (L BKA prosthetic)   Stairs Independent   Prior Functioning: Everyday Activities   Self Care Independent   Indoor Mobility (Ambulation) Independent   Stairs Independent   Functional Cognition Independent   Prior Device Use Orthotics/Prosthetics   Vitals   O2 (LPM) 0   O2 Delivery Device None - Room Air   Pain 0 - 10 Group   Comfort Goal 0   Therapist Pain Assessment 0   Cognition    Cognition / Consciousness WDL   ABS (Agitated Behavior Scale)   Agitated Behavior Scale Performed No   Passive ROM Lower Body   Comments WNL but Hx of L BKA   Active ROM Lower Body    Comments RLE WNL; LLE limited by strength, has Hx of L BKA   Strength Lower Body   Lower Body Strength  X   Rt Hip Extension Strength   (grossly 3+/5)   Rt Hip Flexion Strength 4 (G)   Rt Hip Abduction Strength 4 (G)   Rt Hip Adduction Strength 4 (G)   Rt Knee Flexion Strength 4 (G)   Rt Knee Extension Strength 4 (G)   Rt Ankle Dorsiflexion Strength 4 (G)   Rt  Ankle Plantar Flexion Strength 2+ (P+)   Lt Hip Extension Strength   (grossly 3/5)   Lt Hip Flexion Strength 3- (F-)   Lt Hip Abduction Strength 2- (P-)   Lt Hip Adduction Strength 3- (F-)   Lt Knee Flexion Strength 3- (F-)   Lt Knee Extension Strength 3+ (F+)   Lt Ankle Dorsiflexion Strength   (n/a)   Lt Ankle Plantar Flexion Strength   (n/a)   Comments Tested in sitting   Sensation Lower Body   Lower Extremity Sensation   Not Tested   Comments Pt denies sensory BLE neuro signs   Bed Mobility    Supine to Sit Moderate Assist   Sit to Supine Minimal Assist   Sit to Stand   (Max A once, improves to Mod-Min A with repetition, PFWW, gait belt, L BKA Prosthetic, and LUE management vs inattention)   Scooting Contact Guard Assist   Rolling Minimal Assist to Rt.  (SBA to L)   Comments uses bed rail and mildly elevated HOB at eval   Roll Left and Right   Assistance Needed Physical assistance   Physical Assistance Level 25% or less   CARE Score - Roll Left and Right 3   Roll Left and Right Discharge Goal   Discharge Goal 6   Sit to Lying   Assistance Needed Physical assistance   Physical Assistance Level 25% or less   CARE Score - Sit to Lying 3   Sit to Lying Discharge Goal   Discharge Goal 6   Lying to Sitting on Side of Bed   Assistance Needed Physical assistance   Physical Assistance Level 26%-50%   CARE Score - Lying to Sitting on Side of Bed 3   Lying to Sitting on Side of Bed Discharge Goal   Discharge Goal 6   Sit to Stand   Assistance Needed Physical assistance   Physical Assistance Level 26%-50%   CARE Score - Sit to Stand 3   Sit to Stand Discharge Goal   Discharge Goal 6   Chair/Bed-to-Chair Transfer   Assistance Needed Physical assistance   Physical Assistance Level 26%-50%   CARE Score - Chair/Bed-to-Chair Transfer 3   Chair/Bed-to-Chair Transfer Discharge Goal   Discharge Goal 6   Toilet Transfer   Assistance Needed Physical assistance   Physical Assistance Level 26%-50%   CARE Score - Toilet Transfer 3   Car  Generalized anxiety disorder   • Acquired hypothyroidism   • History of anemia   • Altered mental status   • Candidal vulvovaginitis   • Acute vaginitis   • Traumatic ulcer, limited to breakdown of skin (CMS/HCC)       PHYSICAL EXAM    Vital Last Value 24 Hour Range   Temperature 98.3 °F (36.8 °C) Temp  Min: 98 °F (36.7 °C)  Max: 99.3 °F (37.4 °C)   Pulse 96 Pulse  Min: 85  Max: 97   Respiratory 16 Resp  Min: 16  Max: 20   Blood Pressure 161/77 BP  Min: 133/61  Max: 178/72   Pulse Oximetry 92 % SpO2  Min: 89 %  Max: 97 %   CVP   No data recorded     Vital Today Admit   Weight 73 kg Weight: 73.5 kg   Height N/A Height: 5' 2\" (157.5 cm)   BMI N/A BMI (Calculated): 29.39     INTAKE & OUTPUT        Intake/Output Summary (Last 24 hours) at 10/8/2019 0855  Last data filed at 10/8/2019 0444  Gross per 24 hour   Intake 600 ml   Output 2100 ml   Net -1500 ml     I/O last 3 completed shifts:  In: 600 [P.O.:600]  Out: 2100 [Urine:2100]  No intake/output data recorded.    Last Stool Occurrence: 1(incontinent) (10/07/19 2215)    Weight over the past 48 Hours:    Weight    10/05/19 0130 10/06/19 0102 10/07/19 0337 10/08/19 0228   Weight: 75.5 kg 74.4 kg 74.6 kg 73 kg       Vital Signs:    Patient Vitals for the past 48 hrs:   Weight   10/07/19 0337 74.6 kg   10/08/19 0228 73 kg       ALLERGY    ALLERGIES:   Allergen Reactions   • Lipitor [Atorvastatin] PRURITUS   • Benadryl Allergy Other (See Comments)   • Cetirizine Other (See Comments)     Daughter states it made her act/feel goofy   • Fexofenadine Other (See Comments)     acting out, agitated, easily angered   • Gabapentin RASH       Home Medications:     Medications Prior to Admission   Medication Sig Dispense Refill   • escitalopram (LEXAPRO) 10 MG tablet Take 10 mg by mouth daily.     • fluconazole (DIFLUCAN) 150 MG tablet Take 150 mg by mouth daily.     • Cranberry 450 MG Tab Take 450 mg by mouth daily.     • cyanocobalamin 1000 MCG/ML injection INJECT 1ML IN THE MUSCLE  EVERY 14 DAYS 12 mL 4   • mupirocin (BACTROBAN) 2 % ointment Apply topically daily. Apply topically to wound with dressing changes as directed. 44 g prn   • memantine (NAMENDA) 10 MG tablet TAKE 1 TABLET BY MOUTH TWICE DAILY 180 tablet 1   • Emollient (AQUAPHOR) ointment Apply topically as needed for Dry Skin.     • Zinc Oxide (PHARMABASE BARRIER EX) Apply barrier cream topically to groin and buttocks areas twice a day and as needed.     • Cholecalciferol (VITAMIN D) 2000 units capsule Take 2,000 Units by mouth daily.     • Ascorbic Acid (VITAMIN C) 500 MG tablet Take 500 mg by mouth daily.      • aspirin 81 MG chewable tablet Chew 1 tablet by mouth daily. 30 tablet 0   • Multiple Vitamins-Minerals (VITAMIN - THERAPEUTIC MULTIVITAMINS W/MINERALS) Tab Take 1 tablet by mouth daily.     • levothyroxine (SYNTHROID, LEVOTHROID) 125 MCG tablet Take 1 tablet by mouth daily. 90 tablet 3         MEDICATIONS / INFUSIONS     • cefTRIAXone (ROCEPHIN) IV  2,000 mg Intravenous Once   • [Held by provider] aspirin  81 mg Oral Daily   • vitamin C  500 mg Oral Daily   • cholecalciferol  2,000 Units Oral Daily   • escitalopram  10 mg Oral Daily   • fluconazole  150 mg Oral Daily   • levothyroxine  125 mcg Oral QAM AC   • memantine  10 mg Oral BID   • vitamin - therapeutic multivitamins w/minerals  1 tablet Oral Daily   • sodium chloride (PF)  2 mL Intracatheter 2 times per day   • [Held by provider] enoxaparin  40 mg Subcutaneous Daily   • nystatin   Topical Once     • cefTRIAXone (ROCEPHIN) syringe     • sodium chloride 0.9% infusion 25 mL/hr (10/08/19 0818)         PHYSICAL EXAM    General:  Alert, at no distress  Chest/Lungs: diminished air entry, no ronchi or crackles  CVS: S1,S2+  Abdomen: Soft, obese, nontender, BS+.  Neuro: moves all extremities equally.  Extremities: no Edema, +rich          BLOOD SUGAR REVIEW      No results available in last 24 hours    LABS    Recent Labs   Lab 10/07/19  1548 10/05/19  0630   WBC  --  8.2  Transfer   Reason if not Attempted Environmental limitations   CARE Score - Car Transfer 10   Car Transfer Discharge Goal   Discharge Goal 4   Walk 10 Feet   Assistance Needed Physical assistance   Physical Assistance Level 26%-50%   CARE Score - Walk 10 Feet 3   Walk 10 Feet Discharge Goal   Discharge Goal 6   Walk 50 Feet with Two Turns   Assistance Needed Physical assistance   Physical Assistance Level 26%-50%   CARE Score - Walk 50 Feet with Two Turns 3   Walk 50 Feet with Two Turns Discharge Goal   Discharge Goal 6   Walk 150 Feet   Reason if not Attempted Safety concerns   CARE Score - Walk 150 Feet 88   Walk 150 Feet Discharge Goal   Discharge Goal 4   Walking 10 Feet on Uneven Surfaces   Assistance Needed Physical assistance   Physical Assistance Level 26%-50%   CARE Score - Walking 10 Feet on Uneven Surfaces 3   Walking 10 Feet on Uneven Surfaces Discharge Goal   Discharge Goal 4   1 Step (Curb)   Reason if not Attempted Safety concerns   CARE Score - 1 Step (Curb) 88   1 Step (Curb) Discharge Goal   Discharge Goal 4   4 Steps   Reason if not Attempted Safety concerns   CARE Score - 4 Steps 88   4 Steps Discharge Goal   Discharge Goal 4   12 Steps   Reason if not Attempted Safety concerns   CARE Score - 12 Steps 88   12 Steps Discharge Goal   Discharge Goal 4   Picking Up Object   Reason if not Attempted Safety concerns   CARE Score - Picking Up Object 88   Picking Up Object Discharge Goal   Discharge Goal 4   Wheel 50 Feet with Two Turns   Reason if not Attempted Activity not applicable   CARE Score - Wheel 50 Feet with Two Turns 9   Wheel 50 Feet with Two Turns Discharge Goal   Discharge Goal 9   Wheel 150 Feet   Reason if not Attempted Activity not applicable   CARE Score - Wheel 150 Feet 9   Wheel 150 Feet Discharge Goal   Discharge Goal 9   Gait Functional Level of Assist    Gait Level Of Assist Moderate Assist  (improves to Min A with repetition)   Assistive Device Platform Walker  (and gait belt, L    HCT  --  38.5   HGB  --  12.1   PLT  --  342   INR 1.0  --    SODIUM  --  137   POTASSIUM  --  3.8   CHLORIDE  --  104   CO2  --  27   CALCIUM  --  8.7   GLUCOSE  --  101*   BUN  --  16   CREATININE  --  0.78   GFRNA  --  68              RADIOLOGY    Ct Abd Pelvis For Kidneystones Wo Contra    Result Date: 10/3/2019  EXAM: CT ABDOMEN PELVIS FOR KIDNEY STONES WO CONTRAST. HISTORY: Abnormal findings in urine.    COMPARISON: Correlation to CT of the chest of 08/02/2018.    TECHNIQUE:  Noncontrast CT of the abdomen and pelvis, with multiplanar reformatted images, utilizing the renal stone protocol. Note should be made that the lack of IV contrast decreases the sensitivity for detection of pathology within solid organs. Furthermore, the urinary collecting systems are not fully evaluated on a noncontrast CT.    FINDINGS: Motion artifact mildly degrades image quality. Cyst upper right kidney, 3.6 cm. Focal cortical scar posterior right kidney inferiorly. No evidence for urinary calculus or obstruction. Urinary bladder is mostly decompressed, with associated mildly thickened wall. Vyas catheter in place. Air in the urinary bladder is likely related to the catheterization. Calcified uterine fundal small fibroids. No significant free fluid in the pelvis. Mild fat-containing left inguinal hernia. Moderate colonic stool load, notably in the rectum. Mildly redundant distal colon. No small bowel distention. Mild midline ventral hernia, which measures about 3.1 cm wide; this partially contains nondistended loops of small bowel. Findings suspicious for gallstones which are very minimally calcified, if at all. Mild pancreatic atrophy. Coronary artery calcifications and/or stents. Mild cardiac enlargement. Mild patchy scarring or atelectasis in the lung bases. Degenerative changes thoracolumbar spine. Degenerative changes SI joints, hips, and pubic symphysis. Demineralization. Mild nonspecific subcutaneous edema about the  BKA prosthetic, LUE management vs inattention)   Distance (Feet)   (88 ft, 138 ft, and 2 shorter in-room/bathroom distances)   # of Times Distance was Traveled 1   Deviation Step To;Decreased Base Of Support;Bradykinetic;Decreased Heel Strike;Decreased Toe Off;Other (Comment)  (increased B knee flex and B hip flex throughout)   Wheelchair Functional Level of Assist   Wheelchair Assist   (n/a)   Distance Wheelchair (Feet or Distance) n/a   Wheelchair Description   (n/a)   Stairs Functional Level of Assist   Level of Assist with Stairs Unable to Participate   # of Stairs Climbed 0   Stairs Description Safety concerns   Transfer Functional Level of Assist   Bed, Chair, Wheelchair Transfer Moderate Assist   Bed Chair Wheelchair Transfer Description Adaptive equipment;Increased time;Set-up of equipment;Supervision for safety;Verbal cueing   Toilet Transfers Moderate Assist   Toilet Transfer Description Adaptive equipment;Grab bar;Increased time;Set-up of equipment;Supervision for safety;Verbal cueing   Problem List    Problems Impaired Bed Mobility;Impaired Transfers;Impaired Ambulation;Functional Strength Deficit;Impaired Balance;Decreased Activity Tolerance   Precautions   Precautions Fall Risk   Comments L hemiplegia, L BKA prosthetic (2019), DM, Admit isolation precautions   Current Discharge Plan   Current Discharge Plan Return to Prior Living Situation   Interdisciplinary Plan of Care Collaboration   IDT Collaboration with  Occupational Therapist;Nursing   Patient Position at End of Therapy Seated;Chair Alarm On;Self Releasing Lap Belt Applied;Call Light within Reach;Tray Table within Reach;Phone within Reach   Collaboration Comments CLOF, room board updated   Benefit   Therapy Benefit Patient Would Benefit from Inpatient Rehabilitation Physical Therapy to Maximize Functional [place] with ADLs, IADLs and Mobility.   Strengths & Barriers   Strengths Able to follow instructions;Alert and oriented;Effective  bilateral lateral thighs.     IMPRESSION: 1.  Moderate colonic stool load, notably in the rectum. 2.  Findings suspicious for faint gallstones. Ultrasound would be much more sensitive. 3.  No evidence for urinary calculus or obstruction. 4.  Right renal cyst. 5.  Vyas catheter in place. 6.  Small calcified uterine fibroids. 7.  Fat-containing left inguinal hernia. 8.  Demineralization. Degenerative/arthritic changes.          Assessment and Plan    Acute MDR Escherichia coli UTI  Metabolic encephalopathy, resolved  S/P suprapubic catheter by IR 10/8/19  Diabetes mellitus T2  Hypothyroidism  Recurrent UTI  Vaginal candidiasis  Dementia  Superficial Chest wall wound    Plan SPC cath placement by IR today  wound care help appreciated  Asa and Lovenox on hold  Off meropenem per ID  Ct Fluconazole  Ct Levoxyl  Continue to keep covered with Allevyns, change qod  DC likely am    Nirmal Barbosa MD  10/8/2019   communication skills;Good insight into deficits/needs;Independent prior level of function;Making steady progress towards goals;Manages pain appropriately;Motivated for self care and independence;Pleasant and cooperative;Supportive family;Willingly participates in therapeutic activities   Barriers Decreased endurance;Generalized weakness;Impaired activity tolerance;Impaired balance;Limited mobility   Therapy Missed   Missed Therapy (Minutes) 15   Reason For Missed Therapy Non-Medical - Other (Please Comment)  (Patient with spiritual guidance for 10 minutes; madeup therapy time at end of session.)   PT Total Time Spent   PT Individual Total Time Spent (Mins) 60   PT Charge Group   Charges Yes   PT Gait Training 1   PT Evaluation PT Evaluation Mod       Assessment  Patient is 56 y.o. male with a diagnosis of CVA (acute infarct in the right posterior limb of internal capsule adjacent to the basal ganglia, per MRI) with associated left hemiplegia and decreased mobility.    Additional factors influencing patient status / progress (ie: cognitive factors, co-morbidities, social support, etc): PMH includes L BKA with prosthetic, CAD, MI, HTN, Type 2 DM; IND PLOF per patient including working full-time; very motivated; impaired balance, strength (hemiplegia), endurance; L inattention occasionally; decreased A with repetition; good carryover with new task learning; great motivation; pleasant.        Plan  Recommend Physical Therapy  minutes per day 5-7 days per week for 2 weeks for the following treatments:  PT Group Therapy, PT Prosthetic Training, PT Gait Training, PT Therapeutic Exercises, PT TENS Application, PT Neuro Re-Ed/Balance, PT Aquatic Therapy, PT Therapeutic Activity and PT Manual Therapy.    Passport items to be completed:  Passport items to be completed:  Get in/out of bed safely, in/out of a vehicle, safely use mobility device, walk or wheel around home/community, navigate up and down stairs, show how to  "get up/down from the ground, ensure home is accessible, demonstrate HEP, complete caregiver training    Goals:  Long term and short term goals have been discussed with patient and they are in agreement.    Physical Therapy Problems (Active)       Problem: Mobility       Dates: Start: 02/25/22         Goal: STG-Within one week, patient will ambulate 150 feet with standard FWW, gait belt, L BKA prosthetic, and CGA.       Dates: Start: 02/25/22            Goal: STG-Within one week, patient will ascend and descend four to six stairs with B railings, gait belt, L BKA prosthetic, and Min A.       Dates: Start: 02/25/22               Problem: Mobility Transfers       Dates: Start: 02/25/22         Goal: STG-Within one week, patient will perform bed mobility with bed rail, cueing, and SBA.       Dates: Start: 02/25/22            Goal: STG-Within one week, patient will sit to stand with standard FWW, gait belt, L BKA prosthetic, and CGA.       Dates: Start: 02/25/22            Goal: STG-Within one week, patient will transfer bed to chair with standard FWW, gait belt, L BKA prosthetic, and CGA.       Dates: Start: 02/25/22               Problem: PT-Long Term Goals       Dates: Start: 02/25/22         Goal: LTG-By discharge, patient will ambulate 300 feet with SPC and L BKA prosthetic at SPV level.       Dates: Start: 02/25/22            Goal: LTG-By discharge, patient will transfer one surface to another with SPC and L BKA prosthetic at IND level.       Dates: Start: 02/25/22            Goal: LTG-By discharge, patient will perform home exercise program with handout and L BKA prosthetic at IND level.       Dates: Start: 02/25/22            Goal: LTG-By discharge, patient will transfer in/out of a car with SPC and L BKA prosthetic at SPV level.       Dates: Start: 02/25/22            Goal: LTG-By discharge, patient will ascend/descend a 4\" curb with SPC and L BKA prosthetic at SPV level.       Dates: Start: 02/25/22          "

## 2022-02-26 NOTE — CARE PLAN
Problem: VTE Prevention  Goal: Patient will remain free from venous thromboembolism (VTE)  Outcome: Progressing  Note: Pt is up and walking, participates in therapies, and up to dinning room for all meals.    The patient is Stable - Low risk of patient condition declining or worsening    Shift Goals  Clinical Goals: Monitor BG, pain control, safety  Patient Goals: Gain strength and endurance    Progress made toward(s) clinical / shift goals:  wnl    Patient is not progressing towards the following goals:

## 2022-02-26 NOTE — THERAPY
"Physical Therapy   Daily Treatment     Patient Name: Israel Aviles  Age:  56 y.o., Sex:  male  Medical Record #: [id number]  Today's Date: 2/26/2022     Precautions  Precautions: Fall Risk  Comments: L hemiplegia, L BKA (prosthetic)    Subjective    \"I want to work on my left arm more\". Pt agreeable to PT session.     Objective       02/26/22 1331   Pain   Intervention Declines   Cognition    Level of Consciousness Alert   Sleep/Wake Cycle   Sleep & Rest Awake   Gait Functional Level of Assist    Gait Level Of Assist Minimal Assist   Assistive Device Platform Walker   Distance (Feet)   (100)   # of Times Distance was Traveled 2   Deviation Step To;Shuffled Gait;Decreased Heel Strike;Decreased Toe Off  (hip hike on L for prosthetic clearance)   Transfer Functional Level of Assist   Bed, Chair, Wheelchair Transfer Minimal Assist   Bed Chair Wheelchair Transfer Description Adaptive equipment;Increased time;Set-up of equipment;Supervision for safety  (assist to don prosthetic leg)   Supine Lower Body Exercise   Bridges Two Legged;2 sets of 10   Knee to Chest 2 sets of 10;Bilateral  (alternating)   Comments Bridges with emphasis on bringing hips up equally. Alternating single knee to chest with focus on concentric and eccentric control. Required some assist to move in a smooth motion.   Bed Mobility    Supine to Sit Contact Guard Assist   Sit to Supine Minimal Assist   Sit to Stand Minimal Assist   Interdisciplinary Plan of Care Collaboration   IDT Collaboration with  Occupational Therapist   Patient Position at End of Therapy Seated;Chair Alarm On;Self Releasing Lap Belt Applied;Call Light within Reach;Tray Table within Reach;Phone within Reach   Collaboration Comments Pt requesting UE exercise and did not have OT scheduled today. OT, Minerva, assisted briefly with recommending a few exercises. Pt provided with pink foam block for  exercises 3x10 and demo'd AAROM for shoulder flexion and punch outs in sitting. "   PT Total Time Spent   PT Individual Total Time Spent (Mins) 30   PT Charge Group   PT Gait Training 1   PT Therapeutic Exercise 1       Assessment    Pt tolerated therapy session well with focus on LE strengthening and gait. Pt was able to walk with the platform walker without notable inattention. Pt with a hip hike on the L to be able to clear his foot due to hip flexor weakness. This weakness was also evident with supine single knee to chest with lack of control throughout the motion. Pt is very pleasant and motivated.  Strengths: Able to follow instructions,Alert and oriented,Effective communication skills,Good insight into deficits/needs,Independent prior level of function,Making steady progress towards goals,Manages pain appropriately,Motivated for self care and independence,Pleasant and cooperative,Supportive family,Willingly participates in therapeutic activities  Barriers: Decreased endurance,Generalized weakness,Impaired activity tolerance,Impaired balance,Limited mobility    Plan    LLE strengthening with focus on hip flexors and glutes, gait with platform FWW versus quad cane, standing balance, stair assessment, bed mobility with genie technique    Passport items to be completed:  Get in/out of bed safely, in/out of a vehicle, safely use mobility device, walk or wheel around home/community, navigate up and down stairs, show how to get up/down from the ground, ensure home is accessible, demonstrate HEP, complete caregiver training    Physical Therapy Problems (Active)       Problem: Mobility       Dates: Start: 02/25/22         Goal: STG-Within one week, patient will ambulate 150 feet with standard FWW, gait belt, L BKA prosthetic, and CGA.       Dates: Start: 02/25/22            Goal: STG-Within one week, patient will ascend and descend four to six stairs with B railings, gait belt, L BKA prosthetic, and Min A.       Dates: Start: 02/25/22               Problem: Mobility Transfers       Dates: Start:  "02/25/22         Goal: STG-Within one week, patient will perform bed mobility with bed rail, cueing, and SBA.       Dates: Start: 02/25/22            Goal: STG-Within one week, patient will sit to stand with standard FWW, gait belt, L BKA prosthetic, and CGA.       Dates: Start: 02/25/22            Goal: STG-Within one week, patient will transfer bed to chair with standard FWW, gait belt, L BKA prosthetic, and CGA.       Dates: Start: 02/25/22               Problem: PT-Long Term Goals       Dates: Start: 02/25/22         Goal: LTG-By discharge, patient will ambulate 300 feet with SPC and L BKA prosthetic at SPV level.       Dates: Start: 02/25/22            Goal: LTG-By discharge, patient will transfer one surface to another with SPC and L BKA prosthetic at IND level.       Dates: Start: 02/25/22            Goal: LTG-By discharge, patient will perform home exercise program with handout and L BKA prosthetic at IND level.       Dates: Start: 02/25/22            Goal: LTG-By discharge, patient will transfer in/out of a car with SPC and L BKA prosthetic at SPV level.       Dates: Start: 02/25/22            Goal: LTG-By discharge, patient will ascend/descend a 4\" curb with SPC and L BKA prosthetic at SPV level.       Dates: Start: 02/25/22              "

## 2022-02-26 NOTE — THERAPY
Physical Therapy   Daily Treatment     Patient Name: Israel Aviles  Age:  56 y.o., Sex:  male  Medical Record #: [id number]  Today's Date: 2/26/2022     Precautions  Precautions: (P) Fall Risk  Comments: (P) L hemiplegia, L BKA (prosthetic)    Subjective    I need to use the toilet. I need help putting on my prosthesis.     Objective       02/26/22 0831   Precautions   Precautions Fall Risk   Comments L hemiplegia, L BKA (prosthetic)   Pain   Intervention Declines   Cognition    Level of Consciousness Alert   ABS (Agitated Behavior Scale)   Agitated Behavior Scale Performed Yes   Short Attention Span, Easy Distractibility, Inability to Concentrate 1   Impulsive, Impatient, Low Tolerance for Pain or Frustration 1   Uncooperative, Resistant to Care, Demanding 1   Violent and/or Threatening Violence Toward People or Property 1   Explosive and/or Unpredictable Anger 1   Rocking, Rubbing, Moaning, Other Self-Stimulating Behavior 1   Pulling at Tubes, Restraints, etc. 1   Wandering from Treatment Area 1   Restlessness, Pacing, Excessive Movement 1   Repetitive Behaviors, Motor and/or Verbal 1   Rapid, Loud or Excessive Talking 1   Sudden Changes of Mood 1   Easily Initiated - Excessive Crying and/or Laughter 1   Self-Abusiveness, Physical and/or Verbal 1   Agitated Behavior Scale Total Score 14   Level of Severity No Agitation   Sleep/Wake Cycle   Sleep & Rest Resting   Gait Functional Level of Assist    Gait Level Of Assist Minimal Assist  (with gait belt)   Assistive Device Platform Walker  (L BKA)   Distance (Feet) 125   # of Times Distance was Traveled 1   Deviation Step To;Shuffled Gait;Decreased Heel Strike;Decreased Toe Off   Transfer Functional Level of Assist   Bed, Chair, Wheelchair Transfer Minimal Assist   Bed Chair Wheelchair Transfer Description Adaptive equipment;Supervision for safety;Requires lift;Initial preparation for task;Set-up of equipment   Toilet Transfers Moderate Assist   Toilet Transfer  Description Adaptive equipment;Grab bar;Increased time;Set-up of equipment;Supervision for safety;Verbal cueing   Bed Mobility    Supine to Sit Contact Guard Assist   Sit to Stand Minimal Assist   Neuro-Muscular Treatments   Neuro-Muscular Treatments Postural Facilitation;Sequencing   Interdisciplinary Plan of Care Collaboration   IDT Collaboration with  Therapy Tech   Patient Position at End of Therapy Seated;Chair Alarm On;Call Light within Reach;Tray Table within Reach;Phone within Reach   Collaboration Comments daily schedule   PT Total Time Spent   PT Individual Total Time Spent (Mins) 30   PT Charge Group   PT Gait Training 1   PT Therapeutic Activities 1       Assessment    Patient able to walk therapist through donning prosthesis. Impulsive with transfers, not getting equipment in place properly. Supervised with  mobility to gym. Camilla for L UE placement on platform walker due to neglect. Household gait completed with walker, with minor shuffling L LE at end of 125 ft.     Strengths: Able to follow instructions,Alert and oriented,Effective communication skills,Good insight into deficits/needs,Independent prior level of function,Making steady progress towards goals,Manages pain appropriately,Motivated for self care and independence,Pleasant and cooperative,Supportive family,Willingly participates in therapeutic activities  Barriers: Decreased endurance,Generalized weakness,Impaired activity tolerance,Impaired balance,Limited mobility    Plan  Transfer training, gait training with PFWW, strengthening L UE/LE and HEP instruction, L side coordination.    Passport items to be completed:  [unfilled]    Physical Therapy Problems (Active)       Problem: Mobility       Dates: Start: 02/25/22         Goal: STG-Within one week, patient will ambulate 150 feet with standard FWW, gait belt, L BKA prosthetic, and CGA.       Dates: Start: 02/25/22            Goal: STG-Within one week, patient will ascend and descend four  "to six stairs with B railings, gait belt, L BKA prosthetic, and Min A.       Dates: Start: 02/25/22               Problem: Mobility Transfers       Dates: Start: 02/25/22         Goal: STG-Within one week, patient will perform bed mobility with bed rail, cueing, and SBA.       Dates: Start: 02/25/22            Goal: STG-Within one week, patient will sit to stand with standard FWW, gait belt, L BKA prosthetic, and CGA.       Dates: Start: 02/25/22            Goal: STG-Within one week, patient will transfer bed to chair with standard FWW, gait belt, L BKA prosthetic, and CGA.       Dates: Start: 02/25/22               Problem: PT-Long Term Goals       Dates: Start: 02/25/22         Goal: LTG-By discharge, patient will ambulate 300 feet with SPC and L BKA prosthetic at SPV level.       Dates: Start: 02/25/22            Goal: LTG-By discharge, patient will transfer one surface to another with SPC and L BKA prosthetic at IND level.       Dates: Start: 02/25/22            Goal: LTG-By discharge, patient will perform home exercise program with handout and L BKA prosthetic at IND level.       Dates: Start: 02/25/22            Goal: LTG-By discharge, patient will transfer in/out of a car with SPC and L BKA prosthetic at SPV level.       Dates: Start: 02/25/22            Goal: LTG-By discharge, patient will ascend/descend a 4\" curb with SPC and L BKA prosthetic at SPV level.       Dates: Start: 02/25/22              "

## 2022-02-26 NOTE — CARE PLAN
The patient is Watcher - Medium risk of patient condition declining or worsening    Shift Goals  Clinical Goals: Monitor BG, pain control, safety  Patient Goals: Gain strength and endurance    Progress made toward(s) clinical / shift goals:    Problem: Skin Integrity  Goal: Patient's skin integrity will be maintained or improve  Outcome: Progressing  Note: Patient's skin remains intact and free from new or accidental injury this shift.  Will continue to monitor.      Problem: Bladder / Voiding  Goal: Patient will establish and maintain regular urinary output  Outcome: Progressing  Note: Voiding without difficulty, denies any pain.     VSS. Denies pain or discomfort. Slept well on NOC shift.

## 2022-02-27 LAB
GLUCOSE BLD STRIP.AUTO-MCNC: 205 MG/DL (ref 65–99)
GLUCOSE BLD STRIP.AUTO-MCNC: 250 MG/DL (ref 65–99)
GLUCOSE BLD STRIP.AUTO-MCNC: 88 MG/DL (ref 65–99)
GLUCOSE BLD STRIP.AUTO-MCNC: 97 MG/DL (ref 65–99)

## 2022-02-27 PROCEDURE — 97032 APPL MODALITY 1+ESTIM EA 15: CPT

## 2022-02-27 PROCEDURE — A9270 NON-COVERED ITEM OR SERVICE: HCPCS | Performed by: PHYSICAL MEDICINE & REHABILITATION

## 2022-02-27 PROCEDURE — 770010 HCHG ROOM/CARE - REHAB SEMI PRIVAT*

## 2022-02-27 PROCEDURE — 700111 HCHG RX REV CODE 636 W/ 250 OVERRIDE (IP): Performed by: PHYSICAL MEDICINE & REHABILITATION

## 2022-02-27 PROCEDURE — 99232 SBSQ HOSP IP/OBS MODERATE 35: CPT | Performed by: HOSPITALIST

## 2022-02-27 PROCEDURE — 97130 THER IVNTJ EA ADDL 15 MIN: CPT

## 2022-02-27 PROCEDURE — 700102 HCHG RX REV CODE 250 W/ 637 OVERRIDE(OP): Performed by: HOSPITALIST

## 2022-02-27 PROCEDURE — 97129 THER IVNTJ 1ST 15 MIN: CPT

## 2022-02-27 PROCEDURE — 700102 HCHG RX REV CODE 250 W/ 637 OVERRIDE(OP): Performed by: PHYSICAL MEDICINE & REHABILITATION

## 2022-02-27 PROCEDURE — A9270 NON-COVERED ITEM OR SERVICE: HCPCS | Performed by: HOSPITALIST

## 2022-02-27 PROCEDURE — 97112 NEUROMUSCULAR REEDUCATION: CPT

## 2022-02-27 PROCEDURE — 82962 GLUCOSE BLOOD TEST: CPT | Mod: 91

## 2022-02-27 RX ORDER — GLIPIZIDE 5 MG/1
5 TABLET ORAL
Status: DISCONTINUED | OUTPATIENT
Start: 2022-02-27 | End: 2022-02-28

## 2022-02-27 RX ADMIN — METOPROLOL SUCCINATE 12.5 MG: 25 TABLET, EXTENDED RELEASE ORAL at 20:02

## 2022-02-27 RX ADMIN — INSULIN LISPRO 4 UNITS: 100 INJECTION, SOLUTION INTRAVENOUS; SUBCUTANEOUS at 07:52

## 2022-02-27 RX ADMIN — GLIPIZIDE 5 MG: 5 TABLET ORAL at 11:24

## 2022-02-27 RX ADMIN — SENNOSIDES AND DOCUSATE SODIUM 2 TABLET: 50; 8.6 TABLET ORAL at 08:51

## 2022-02-27 RX ADMIN — INSULIN LISPRO 4 UNITS: 100 INJECTION, SOLUTION INTRAVENOUS; SUBCUTANEOUS at 11:26

## 2022-02-27 RX ADMIN — SENNOSIDES AND DOCUSATE SODIUM 2 TABLET: 50; 8.6 TABLET ORAL at 20:01

## 2022-02-27 RX ADMIN — ATORVASTATIN CALCIUM 80 MG: 40 TABLET, FILM COATED ORAL at 08:52

## 2022-02-27 RX ADMIN — Medication 2000 UNITS: at 08:52

## 2022-02-27 RX ADMIN — METFORMIN HYDROCHLORIDE 1000 MG: 500 TABLET ORAL at 16:53

## 2022-02-27 RX ADMIN — GABAPENTIN 300 MG: 300 CAPSULE ORAL at 20:01

## 2022-02-27 RX ADMIN — ASPIRIN 81 MG: 81 TABLET, COATED ORAL at 08:52

## 2022-02-27 RX ADMIN — CLOPIDOGREL BISULFATE 75 MG: 75 TABLET ORAL at 08:51

## 2022-02-27 RX ADMIN — METFORMIN HYDROCHLORIDE 1000 MG: 500 TABLET ORAL at 08:52

## 2022-02-27 RX ADMIN — METOPROLOL SUCCINATE 12.5 MG: 25 TABLET, EXTENDED RELEASE ORAL at 08:52

## 2022-02-27 RX ADMIN — ENOXAPARIN SODIUM 40 MG: 40 INJECTION SUBCUTANEOUS at 08:51

## 2022-02-27 RX ADMIN — OMEPRAZOLE 20 MG: 20 CAPSULE, DELAYED RELEASE ORAL at 08:51

## 2022-02-27 ASSESSMENT — PAIN DESCRIPTION - PAIN TYPE: TYPE: ACUTE PAIN

## 2022-02-27 ASSESSMENT — ENCOUNTER SYMPTOMS
COUGH: 0
DIZZINESS: 0
DIARRHEA: 0
FEVER: 0
BLURRED VISION: 0
NERVOUS/ANXIOUS: 0

## 2022-02-27 ASSESSMENT — FIBROSIS 4 INDEX: FIB4 SCORE: 2.03

## 2022-02-27 NOTE — THERAPY
Occupational Therapy  Daily Treatment     Patient Name: Israel Aviles  Age:  56 y.o., Sex:  male  Medical Record #: [id number]  Today's Date: 2/27/2022     Precautions  Precautions: (P) Fall Risk  Comments: (P) L hemiplegia, L BKA (prosthetic)         Subjective    Agreeable to complete  setup      Objective       02/27/22 1001   Precautions   Precautions Fall Risk   Comments L hemiplegia, L BKA (prosthetic)   Interdisciplinary Plan of Care Collaboration   Patient Position at End of Therapy Seated;Phone within Reach;Tray Table within Reach;Call Light within Reach   OT Total Time Spent   OT Individual Total Time Spent (Mins) 60   OT Charge Group   OT Electrical Stimulation Attended 1   OT Neuromuscular Re-education / Balance 3   Pt set up on RT-300 FES for LUE neuro re-ed, ROM and sensory input.  Electrodes applied to L scap stabilizers, shoulder, biceps, triceps, and wrist flexors/extensors. LUE placed in arm trough with ace wrap applied at L wrist for increased stability while allowing for finger flex/ext with stimulation. Muscle stimulation parameters assessed for each muscle group to pt tolerance to e-stim and muscle contraction observed.  Pt tolerated 23 min 53 sec of cycling, including warm up and cool down periods with results as follows: distance travelled: 2.44 miles, energy expended: 0.4 kCal, energy per hour: 1.2 kCal/hr, avg power: 1.4 W, , avg asymmetry: 0%, active time 17 min 20 sec.  Pt tolerated well with no c/o pain.      Assessment    Pt tolerated session well focused on setting up and completing exercise on  for LUE. Pt reported that he would like to cont working with  and was placed on tech assist list. Pt cont to be very motivated to increase function of LUE.   Strengths: Supportive family,Willingly participates in therapeutic activities,Pleasant and cooperative,Motivated for self care and independence,Good endurance,Good balance,Effective communication skills,Able to follow  instructions  Barriers: Decreased endurance,Hemiparesis,Limited mobility,Impaired balance    Plan    LUE neuro re-education, ADLs, balance, functional transfers/mobility   w/ tech 3X week     Passport items to be completed:  Perform bathroom transfers, complete dressing, complete feeding, get ready for the day, prepare a simple meal, participate in household tasks, adapt home for safety needs, demonstrate home exercise program, complete caregiver training     Occupational Therapy Goals (Active)       Problem: Bathing       Dates: Start: 02/25/22         Goal: STG-Within one week, patient will bathe with supervision.       Dates: Start: 02/25/22               Problem: Dressing       Dates: Start: 02/25/22         Goal: STG-Within one week, patient will dress LB including prosthetic w/ min A.        Dates: Start: 02/25/22               Problem: Functional Transfers       Dates: Start: 02/25/22         Goal: STG-Within one week, patient will transfer to step in shower with CGA.       Dates: Start: 02/25/22               Problem: OT Long Term Goals       Dates: Start: 02/25/22         Goal: LTG-By discharge, patient will complete basic self care tasks with mod I to supervision.        Dates: Start: 02/25/22            Goal: LTG-By discharge, patient will perform bathroom transfers with mod I to supervision.        Dates: Start: 02/25/22

## 2022-02-27 NOTE — CARE PLAN
Problem: Nutrition  Goal: Patient's nutritional and fluid intake will be adequate or improve  Outcome: Progressing     Problem: Diabetes Management  Goal: Patient's ability to maintain appropriate glucose levels will be maintained or improve  Outcome: Progressing    Pt understands his diabetes medications and what they do. Pt is also agreeable when it comes to his diabetes care and is cooperative when given specific diet instructions.

## 2022-02-27 NOTE — PROGRESS NOTES
[facility] Medicine Daily Progress Note    Date of Service  2/27/2022    Chief Complaint:  Hypertension  Diabetes    Interval History:  Discussed with pt about his BS still elevated and will start Glipizide.    Review of Systems  Review of Systems   Constitutional: Negative for fever.   Eyes: Negative for blurred vision.   Respiratory: Negative for cough.    Cardiovascular: Negative for chest pain.   Gastrointestinal: Negative for diarrhea.   Musculoskeletal: Negative for joint pain.   Neurological: Negative for dizziness.   Psychiatric/Behavioral: The patient is not nervous/anxious.         Physical Exam  Temp:  [36.6 °C (97.9 °F)-36.7 °C (98 °F)] 36.7 °C (98 °F)  Pulse:  [76-84] 80  Resp:  [16-18] 16  BP: (116-125)/(73-85) 122/85  SpO2:  [94 %] 94 %    Physical Exam  Vitals and nursing note reviewed.   Constitutional:       Appearance: He is not diaphoretic.   HENT:      Mouth/Throat:      Pharynx: No oropharyngeal exudate or posterior oropharyngeal erythema.   Eyes:      Extraocular Movements: Extraocular movements intact.   Neck:      Vascular: No carotid bruit.   Cardiovascular:      Rate and Rhythm: Normal rate and regular rhythm.   Pulmonary:      Effort: Pulmonary effort is normal.      Breath sounds: No wheezing or rales.   Abdominal:      General: There is no distension.      Palpations: Abdomen is soft.      Tenderness: There is no abdominal tenderness.   Musculoskeletal:      Right lower leg: No edema.      Left lower leg: No edema.   Skin:     General: Skin is warm and dry.   Neurological:      Mental Status: He is alert and oriented to person, place, and time.   Psychiatric:         Mood and Affect: Mood normal.         Behavior: Behavior normal.         Fluids    Intake/Output Summary (Last 24 hours) at 2/27/2022 0953  Last data filed at 2/27/2022 0200  Gross per 24 hour   Intake 1040 ml   Output 1200 ml   Net -160 ml       Laboratory  Recent Labs     02/25/22  0553   WBC 4.9   RBC 4.30*   HEMOGLOBIN  13.4*   HEMATOCRIT 38.9*   MCV 90.5   MCH 31.2   MCHC 34.4   RDW 42.8   PLATELETCT 156*   MPV 10.7     Recent Labs     02/25/22  0553   SODIUM 140   POTASSIUM 4.1   CHLORIDE 102   CO2 26   GLUCOSE 201*   BUN 15   CREATININE 0.64   CALCIUM 9.0                   Imaging    Assessment/Plan  * Right sided cerebral hemisphere cerebrovascular accident (CVA) (HCC)- (present on admission)  Assessment & Plan  MRI brain: showed acute infarct in the right posterior limb of internal capsule adjacent to the basal ganglia.  On ASA and Plavix  On Lipitor    Below-knee amputation (HCC)- (present on admission)  Assessment & Plan  Hx of left BKA in 2019  2nd to diabetes and PAD    HTN (hypertension)- (present on admission)  Assessment & Plan  BP ok  On Toprol XL: 12.5 mg bid  Monitor    Vitamin D deficiency  Assessment & Plan  Vit D: 11  On supplements    Uncontrolled type 2 diabetes mellitus with hyperglycemia ([facility])- (present on admission)  Assessment & Plan  Hba1c: 8.2 (2/25) -- has been this elevated for a while  BS still elevated  On Metformin: 500 mg bid --> 1000 mg bid (2/25)  Will start GLipizide  Note: home meds include Metformin 500 mg bid and Glipizide 2.5 mg bid  Cont to monitor

## 2022-02-27 NOTE — CARE PLAN
The patient is Stable - Low risk of patient condition declining or worsening    Shift Goals  Clinical Goals: safety  Patient Goals: Gain strength and endurance      Problem: Mobility  Goal: Patient's capacity to carry out activities will improve  Outcome: Progressing: Pt able to reposition self in bed, no redness of bony prominences noted. Skin remains intact, right foot has dry, calloused, cracking skin.      Problem: Pain - Standard  Goal: Alleviation of pain or a reduction in pain to the patient’s comfort goal  Outcome: Progressing:Pt declined any pain at bedtime but was informed to ring the call light anytime during the night if he needed any pain medication. Pt stated he understood.

## 2022-02-27 NOTE — THERAPY
Speech Language Pathology  Daily Treatment     Patient Name: Israel Aviles  Age:  56 y.o., Sex:  male  Medical Record #: [id number]  Today's Date: 2022     Precautions  Precautions: Fall Risk  Comments: L hemiplegia, L BKA (prosthetic)    Subjective    Pt pleasant and cooperative during tx.       Objective       22 1431   Cognition   Functional Memory Activities Supervision (5)   Complex Reasoning  / Problem Solving Moderate (3)   SLP Total Time Spent   SLP Individual Total Time Spent (Mins) 60   Treatment Charges   SLP Cognitive Skill Development First 15 Minutes 1   SLP Cognitive Skill Development Additional 15 Minutes 3       Assessment    Logical schedulin/7 independently; 100% Hari.  Deduction puzzle (3x4): 33% independent; 100% modA.  Pt recalled daily events given supervision.  Memory book, and RWS memory strategies, introduced this day.  Alternating attn:  Pt stated names of places for each letter of the alphabet with 100% accuracy independently.     Strengths: Able to follow instructions,Alert and oriented,Good insight into deficits/needs,Pleasant and cooperative,Willingly participates in therapeutic activities  Barriers: Impaired functional cognition    Plan    Executive function, alternating attn, recall.          Speech Therapy Problems (Active)       Problem: Memory STGs       Dates: Start: 22         Goal: STG-Within one week, patient will recall daily events and safety strategies with 80% accuracy given min verbal cues and use of memory book.        Dates: Start: 22               Problem: Problem Solving STGs       Dates: Start: 22         Goal: STG-Within one week, patient will complete executive function tasks with 80% accuracy given min verbal cues.         Dates: Start: 22            Goal: STG-Within one week, patient will alternating attn tasks 80% given min verbal cues.         Dates: Start: 22               Problem: Speech/Swallowing LTGs        Dates: Start: 02/25/22         Goal: LTG-By discharge, patient will solve complex problem Zac for safe discharge home.       Dates: Start: 02/25/22

## 2022-02-28 LAB
GLUCOSE BLD STRIP.AUTO-MCNC: 100 MG/DL (ref 65–99)
GLUCOSE BLD STRIP.AUTO-MCNC: 129 MG/DL (ref 65–99)
GLUCOSE BLD STRIP.AUTO-MCNC: 131 MG/DL (ref 65–99)
GLUCOSE BLD STRIP.AUTO-MCNC: 196 MG/DL (ref 65–99)

## 2022-02-28 PROCEDURE — 770010 HCHG ROOM/CARE - REHAB SEMI PRIVAT*

## 2022-02-28 PROCEDURE — A9270 NON-COVERED ITEM OR SERVICE: HCPCS | Performed by: PHYSICAL MEDICINE & REHABILITATION

## 2022-02-28 PROCEDURE — 97110 THERAPEUTIC EXERCISES: CPT

## 2022-02-28 PROCEDURE — 99232 SBSQ HOSP IP/OBS MODERATE 35: CPT | Performed by: HOSPITALIST

## 2022-02-28 PROCEDURE — 97112 NEUROMUSCULAR REEDUCATION: CPT

## 2022-02-28 PROCEDURE — A9270 NON-COVERED ITEM OR SERVICE: HCPCS | Performed by: HOSPITALIST

## 2022-02-28 PROCEDURE — 97129 THER IVNTJ 1ST 15 MIN: CPT

## 2022-02-28 PROCEDURE — 99233 SBSQ HOSP IP/OBS HIGH 50: CPT | Performed by: PHYSICAL MEDICINE & REHABILITATION

## 2022-02-28 PROCEDURE — 700111 HCHG RX REV CODE 636 W/ 250 OVERRIDE (IP): Performed by: PHYSICAL MEDICINE & REHABILITATION

## 2022-02-28 PROCEDURE — 700102 HCHG RX REV CODE 250 W/ 637 OVERRIDE(OP): Performed by: PHYSICAL MEDICINE & REHABILITATION

## 2022-02-28 PROCEDURE — 97530 THERAPEUTIC ACTIVITIES: CPT

## 2022-02-28 PROCEDURE — 97130 THER IVNTJ EA ADDL 15 MIN: CPT

## 2022-02-28 PROCEDURE — 700102 HCHG RX REV CODE 250 W/ 637 OVERRIDE(OP): Performed by: HOSPITALIST

## 2022-02-28 PROCEDURE — 82962 GLUCOSE BLOOD TEST: CPT | Mod: 91

## 2022-02-28 PROCEDURE — 97116 GAIT TRAINING THERAPY: CPT

## 2022-02-28 RX ORDER — GLIPIZIDE 5 MG/1
2.5 TABLET ORAL
Status: DISCONTINUED | OUTPATIENT
Start: 2022-03-01 | End: 2022-03-02

## 2022-02-28 RX ORDER — POLYETHYLENE GLYCOL 3350 17 G/17G
1 POWDER, FOR SOLUTION ORAL
Status: DISCONTINUED | OUTPATIENT
Start: 2022-02-28 | End: 2022-03-12 | Stop reason: HOSPADM

## 2022-02-28 RX ORDER — AMOXICILLIN 250 MG
2 CAPSULE ORAL
Status: DISCONTINUED | OUTPATIENT
Start: 2022-02-28 | End: 2022-03-12 | Stop reason: HOSPADM

## 2022-02-28 RX ORDER — VITAMIN B COMPLEX
4000 TABLET ORAL DAILY
Status: DISCONTINUED | OUTPATIENT
Start: 2022-03-01 | End: 2022-03-07

## 2022-02-28 RX ORDER — BISACODYL 10 MG
10 SUPPOSITORY, RECTAL RECTAL
Status: DISCONTINUED | OUTPATIENT
Start: 2022-02-28 | End: 2022-03-12 | Stop reason: HOSPADM

## 2022-02-28 RX ADMIN — CLOPIDOGREL BISULFATE 75 MG: 75 TABLET ORAL at 07:49

## 2022-02-28 RX ADMIN — ASPIRIN 81 MG: 81 TABLET, COATED ORAL at 07:49

## 2022-02-28 RX ADMIN — METOPROLOL SUCCINATE 12.5 MG: 25 TABLET, EXTENDED RELEASE ORAL at 07:53

## 2022-02-28 RX ADMIN — ENOXAPARIN SODIUM 40 MG: 40 INJECTION SUBCUTANEOUS at 07:48

## 2022-02-28 RX ADMIN — OMEPRAZOLE 20 MG: 20 CAPSULE, DELAYED RELEASE ORAL at 07:49

## 2022-02-28 RX ADMIN — GLIPIZIDE 5 MG: 5 TABLET ORAL at 07:49

## 2022-02-28 RX ADMIN — GABAPENTIN 300 MG: 300 CAPSULE ORAL at 20:16

## 2022-02-28 RX ADMIN — METOPROLOL SUCCINATE 12.5 MG: 25 TABLET, EXTENDED RELEASE ORAL at 20:16

## 2022-02-28 RX ADMIN — ATORVASTATIN CALCIUM 80 MG: 40 TABLET, FILM COATED ORAL at 07:49

## 2022-02-28 RX ADMIN — SENNOSIDES AND DOCUSATE SODIUM 2 TABLET: 50; 8.6 TABLET ORAL at 07:48

## 2022-02-28 RX ADMIN — METFORMIN HYDROCHLORIDE 1000 MG: 500 TABLET ORAL at 16:23

## 2022-02-28 RX ADMIN — Medication 2000 UNITS: at 07:49

## 2022-02-28 RX ADMIN — METFORMIN HYDROCHLORIDE 1000 MG: 500 TABLET ORAL at 07:48

## 2022-02-28 RX ADMIN — INSULIN LISPRO 2 UNITS: 100 INJECTION, SOLUTION INTRAVENOUS; SUBCUTANEOUS at 20:23

## 2022-02-28 ASSESSMENT — GAIT ASSESSMENTS
DEVIATION: STEP TO;DECREASED HEEL STRIKE;DECREASED TOE OFF;BRADYKINETIC
DISTANCE (FEET): 160
ASSISTIVE DEVICE: FRONT WHEEL WALKER
ASSISTIVE DEVICE: PLATFORM WALKER
DEVIATION: STEP TO;DECREASED HEEL STRIKE;DECREASED TOE OFF;BRADYKINETIC
GAIT LEVEL OF ASSIST: STANDBY ASSIST
GAIT LEVEL OF ASSIST: MINIMAL ASSIST
DISTANCE (FEET): 75

## 2022-02-28 ASSESSMENT — ENCOUNTER SYMPTOMS
CHILLS: 0
ABDOMINAL PAIN: 0
VOMITING: 0
NERVOUS/ANXIOUS: 0
SHORTNESS OF BREATH: 0
DIARRHEA: 0
NAUSEA: 0
FEVER: 0

## 2022-02-28 ASSESSMENT — PAIN DESCRIPTION - PAIN TYPE: TYPE: ACUTE PAIN

## 2022-02-28 ASSESSMENT — ACTIVITIES OF DAILY LIVING (ADL)
BED_CHAIR_WHEELCHAIR_TRANSFER_DESCRIPTION: ADAPTIVE EQUIPMENT;INCREASED TIME;SET-UP OF EQUIPMENT;SUPERVISION FOR SAFETY;VERBAL CUEING
TOILET_TRANSFER_DESCRIPTION: GRAB BAR;INCREASED TIME;SET-UP OF EQUIPMENT
BED_CHAIR_WHEELCHAIR_TRANSFER_DESCRIPTION: ADAPTIVE EQUIPMENT;INCREASED TIME;INITIAL PREPARATION FOR TASK;SET-UP OF EQUIPMENT

## 2022-02-28 NOTE — CARE PLAN
Problem: Infection  Goal: Patient will remain free from infection  Note: Patient remains free from s/s infection; afebrile.  Will continue to monitor.      Problem: Pain - Standard  Goal: Alleviation of pain or a reduction in pain to the patient’s comfort goal  Flowsheets (Taken 2/28/2022 1253)  Pain Rating Scale (NPRS): 0  Note: Pt able to participate in therapies and activities this shift.    The patient is Stable - Low risk of patient condition declining or worsening    Shift Goals  Clinical Goals: safety  Patient Goals: strength

## 2022-02-28 NOTE — THERAPY
Speech Language Pathology  Daily Treatment     Patient Name: Israel Aviles  Age:  56 y.o., Sex:  male  Medical Record #: [id number]  Today's Date: 2/28/2022     Precautions  Precautions: Fall Risk  Comments: L hemiplegia, L BKA (prosthetic)    Subjective    Pt pleasant and cooperative during tx.       Objective       02/28/22 1001   Reading Comprehension    Reading Sentences Within Functional Limits (6-7)   Cognition   Moderate Attention Minimal (4)   Functional Memory Activities Supervision (5)   Complex Reasoning  / Problem Solving Moderate (3)   SLP Total Time Spent   SLP Individual Total Time Spent (Mins) 60   Treatment Charges   SLP Cognitive Skill Development First 15 Minutes 1   SLP Cognitive Skill Development Additional 15 Minutes 3       Assessment    Find the THEs: 12/15 independent; 100% Hari.  Deduction puzzle (3x4): 5/12 independent; 100% given min-mod verbal cues.  Sentence matching to pic: 100% independent.  Pt recalled daily events and entered them into his memory book given supervision.    Strengths: Able to follow instructions,Alert and oriented,Good insight into deficits/needs,Pleasant and cooperative,Willingly participates in therapeutic activities  Barriers: Impaired functional cognition    Plan    Executive function, recall, attn. Consider decreasing to 30 if pt in agreement next session.         Speech Therapy Problems (Active)       Problem: Memory STGs       Dates: Start: 02/25/22         Goal: STG-Within one week, patient will recall daily events and safety strategies with 80% accuracy given min verbal cues and use of memory book.        Dates: Start: 02/25/22               Problem: Problem Solving STGs       Dates: Start: 02/25/22         Goal: STG-Within one week, patient will complete executive function tasks with 80% accuracy given min verbal cues.         Dates: Start: 02/25/22            Goal: STG-Within one week, patient will alternating attn tasks 80% given min verbal cues.          Dates: Start: 02/25/22               Problem: Speech/Swallowing LTGs       Dates: Start: 02/25/22         Goal: LTG-By discharge, patient will solve complex problem Zac for safe discharge home.       Dates: Start: 02/25/22

## 2022-02-28 NOTE — THERAPY
Occupational Therapy  Daily Treatment     Patient Name: Israel Aviles  Age:  56 y.o., Sex:  male  Medical Record #: [id number]  Today's Date: 2/28/2022     Precautions  Precautions: (P) Fall Risk  Comments: (P) L hemiparesis, L BKA and prosthesis         Subjective    Pt reports he wants to do the  as much as possible     Objective       02/28/22 1301   Precautions   Precautions Fall Risk   Comments L hemiparesis, L BKA and prosthesis   Strength Upper Body   Lt Shoulder Flexion Strength 3 (F)   Lt Shoulder Extension Strength 3 (F)   Lt Shoulder Abduction Strength 3- (F-)   Lt Elbow Flexion Strength 4 (G)   Lt Elbow Extension Strength 4 (G)   Lt Forearm Supination Strength 3 (F)   Lt Forearm Pronation Strength 3 (F)   Lt Wrist Flexion Strength 3 (F)   Lt Wrist Extension Strength 3 (F)   Left  Impaired   Fine Motor / Dexterity    Fine Motor / Dexterity Yes   Fine Motor / Dexterity Interventions see note for details   Interdisciplinary Plan of Care Collaboration   IDT Collaboration with  Physical Therapist   Patient Position at End of Therapy Seated;Phone within Reach;Tray Table within Reach;Call Light within Reach   Collaboration Comments CLOF   OT Total Time Spent   OT Individual Total Time Spent (Mins) 60   OT Charge Group   OT Neuromuscular Re-education / Balance 2   OT Therapy Activity 2   Neuro rehab for LUE  Pt completed AAROM/AROM of LUE, see strength in flow sheet. Pt completed AAROM of shoulder flexion, shoulder rotation, and elbow flexion/extension by using washcloth on table top while seated in w/c. Pt reached for bean bags to address gross motor, coordination, and grasp/release. Pt demonstrated impaired coordination and required increased time to grasp bean bags and place them in therapists hand. Pt also stacked/unstacked cones and 1in cones. Pt required assistance to stead hand when stack small cubes.     Pt stood in // bars where he participated in bean bag toss. Pt was able to time  release appropriately when tossing bean bags.  Pt had 0 LOB during activity.       Assessment    Pt tolerated session well focused on UE strengthening, neuro re-education, and balance. Pt demonstrate increased strength, GM coordination, and FM coordination in LUE. Pt reports he has seen a big increase in LUE function within the past 24 hours. Pt gives the  credit for his improvement in function and wants to cont to work with  as much as possible.   Strengths: Supportive family,Willingly participates in therapeutic activities,Pleasant and cooperative,Motivated for self care and independence,Good endurance,Good balance,Effective communication skills,Able to follow instructions  Barriers: Decreased endurance,Hemiparesis,Limited mobility,Impaired balance    Plan    LUE neuro re-education, ADLs, balance, functional transfers/mobility   w/ tech 3X week      Passport items to be completed:  Perform bathroom transfers, complete dressing, complete feeding, get ready for the day, prepare a simple meal, participate in household tasks, adapt home for safety needs, demonstrate home exercise program, complete caregiver training     Occupational Therapy Goals (Active)       Problem: Bathing       Dates: Start: 02/25/22         Goal: STG-Within one week, patient will bathe with supervision.       Dates: Start: 02/25/22               Problem: Dressing       Dates: Start: 02/25/22         Goal: STG-Within one week, patient will dress LB including prosthetic w/ min A.        Dates: Start: 02/25/22               Problem: Functional Transfers       Dates: Start: 02/25/22         Goal: STG-Within one week, patient will transfer to step in shower with CGA.       Dates: Start: 02/25/22               Problem: OT Long Term Goals       Dates: Start: 02/25/22         Goal: LTG-By discharge, patient will complete basic self care tasks with mod I to supervision.        Dates: Start: 02/25/22            Goal: LTG-By discharge,  patient will perform bathroom transfers with mod I to supervision.        Dates: Start: 02/25/22

## 2022-02-28 NOTE — PROGRESS NOTES
Rehab Progress Note     Encounter Date: 2/28/2022    CC: Decreased mobility, left sided weakness    Interval Events (Subjective)  Patient sitting up in room. He reports therapy is going well. He reports after his time on the  his left arm strength is much better. He would like to do as much  as possible. Denies pain. Blood sugars remain good over the weekend. Denies NVD.     Objective:  VITAL SIGNS: /81   Pulse 75   Temp 36.9 °C (98.4 °F) (Oral)   Resp 18   Ht 1.829 m (6')   Wt 73.4 kg (161 lb 13.1 oz)   SpO2 94%   BMI 21.95 kg/m²   Gen: NAD  Psych: Mood and affect appropriate  CV: RRR, no edema  Resp: CTAB, no upper airway sounds  Abd: NTND  Neuro: AOx4, following commands    Recent Results (from the past 72 hour(s))   POCT glucose device results    Collection Time: 02/25/22  5:14 PM   Result Value Ref Range    POC Glucose, Blood 210 (H) 65 - 99 mg/dL   POCT glucose device results    Collection Time: 02/25/22  9:46 PM   Result Value Ref Range    POC Glucose, Blood 201 (H) 65 - 99 mg/dL   POCT glucose device results    Collection Time: 02/26/22  8:08 AM   Result Value Ref Range    POC Glucose, Blood 232 (H) 65 - 99 mg/dL   POCT glucose device results    Collection Time: 02/26/22 11:05 AM   Result Value Ref Range    POC Glucose, Blood 181 (H) 65 - 99 mg/dL   POCT glucose device results    Collection Time: 02/26/22  5:21 PM   Result Value Ref Range    POC Glucose, Blood 252 (H) 65 - 99 mg/dL   POCT glucose device results    Collection Time: 02/26/22  8:41 PM   Result Value Ref Range    POC Glucose, Blood 165 (H) 65 - 99 mg/dL   POCT glucose device results    Collection Time: 02/27/22  7:50 AM   Result Value Ref Range    POC Glucose, Blood 205 (H) 65 - 99 mg/dL   POCT glucose device results    Collection Time: 02/27/22 11:13 AM   Result Value Ref Range    POC Glucose, Blood 250 (H) 65 - 99 mg/dL   POCT glucose device results    Collection Time: 02/27/22  4:53 PM   Result Value Ref Range    POC  Glucose, Blood 88 65 - 99 mg/dL   POCT glucose device results    Collection Time: 02/27/22  8:01 PM   Result Value Ref Range    POC Glucose, Blood 97 65 - 99 mg/dL   POCT glucose device results    Collection Time: 02/28/22  7:27 AM   Result Value Ref Range    POC Glucose, Blood 129 (H) 65 - 99 mg/dL   POCT glucose device results    Collection Time: 02/28/22 11:14 AM   Result Value Ref Range    POC Glucose, Blood 100 (H) 65 - 99 mg/dL       Current Facility-Administered Medications   Medication Frequency   • [START ON 3/1/2022] glipiZIDE (GLUCOTROL) tablet 2.5 mg QAM AC   • vitamin D3 (cholecalciferol) tablet 2,000 Units DAILY   • metFORMIN (GLUCOPHAGE) tablet 1,000 mg BID WITH MEALS   • insulin LISPRO (AdmeLOG,HumaLOG) injection 4X/DAY ACHS   • metoprolol SR (TOPROL XL) tablet 12.5 mg BID   • Respiratory Therapy Consult Continuous RT   • hydrALAZINE (APRESOLINE) tablet 25 mg Q8HRS PRN   • acetaminophen (Tylenol) tablet 650 mg Q4HRS PRN   • senna-docusate (PERICOLACE or SENOKOT S) 8.6-50 MG per tablet 2 Tablet BID    And   • polyethylene glycol/lytes (MIRALAX) PACKET 1 Packet QDAY PRN    And   • magnesium hydroxide (MILK OF MAGNESIA) suspension 30 mL QDAY PRN    And   • bisacodyl (DULCOLAX) suppository 10 mg QDAY PRN   • omeprazole (PRILOSEC) capsule 20 mg DAILY   • artificial tears ophthalmic solution 1 Drop PRN   • benzocaine-menthol (Cepacol) lozenge 1 Lozenge Q2HRS PRN   • mag hydrox-al hydrox-simeth (MAALOX PLUS ES or MYLANTA DS) suspension 20 mL Q2HRS PRN   • ondansetron (ZOFRAN ODT) dispertab 4 mg 4X/DAY PRN    Or   • ondansetron (ZOFRAN) syringe/vial injection 4 mg 4X/DAY PRN   • traZODone (DESYREL) tablet 50 mg QHS PRN   • sodium chloride (OCEAN) 0.65 % nasal spray 2 Spray PRN   • oxyCODONE immediate-release (ROXICODONE) tablet 5 mg Q3HRS PRN    Or   • oxyCODONE immediate release (ROXICODONE) tablet 10 mg Q3HRS PRN   • aspirin EC (ECOTRIN) tablet 81 mg DAILY   • atorvastatin (LIPITOR) tablet 80 mg DAILY   •  clopidogrel (PLAVIX) tablet 75 mg DAILY   • enoxaparin (LOVENOX) inj 40 mg DAILY   • gabapentin (NEURONTIN) capsule 300 mg Q EVENING   • dextrose 50% (D50W) injection 50 mL Q15 MIN PRN   • promethazine (PHENERGAN) suppository 12.5-25 mg Q4HRS PRN   • promethazine (PHENERGAN) tablet 12.5-25 mg Q4HRS PRN       Orders Placed This Encounter   Procedures   • Diet Order Diet: Consistent CHO (Diabetic); Miscellaneous modifications: (optional): Vegetarian     Standing Status:   Standing     Number of Occurrences:   1     Order Specific Question:   Diet:     Answer:   Consistent CHO (Diabetic) [4]     Order Specific Question:   Miscellaneous modifications: (optional)     Answer:   Vegetarian [13]       Assessment:  Active Hospital Problems    Diagnosis    • *Right sided cerebral hemisphere cerebrovascular accident (CVA) (HCC)    • Below-knee amputation (HCC)    • CAD (coronary artery disease)    • HTN (hypertension)    • Vitamin D deficiency    • Uncontrolled type 2 diabetes mellitus with hyperglycemia (HCC)        Medical Decision Making and Plan:  R CVA - Patient with acute posterior internal capsule infarct with weakness UE > LE complicated by previous L BKA. Patient is improving quickly  Addendum: IGC changed to left body (R Brain)   -PT and OT for mobility and ADLs  -SLP For cognitive screen     HTN/CAD - Patient on ASA and Plavix. Patient on Metoprolol 12.5 mg BID     DM2 with hyperglycemia - Patient on SSI and Metformin 500 mg BID on transfer.   -Hospitalist consulted. Metformin increased to 1000 mg BID. Into 250s, discussed with hospitlaist and Glipizide added 2.5 mg      L BKA - Limited due to weakness and weight of prosthetic. Monitor for skin breakdown     HLD - Patient on Atorvastatin 80 mg QHS     Neuropathy - Patient on Gabapentin 300 mg QHS     Vitamin D deficiency - 12 on admission. Start on 4000 U, up from 2000 U    DVT Ppx - Patient on Lovenox on transfer.     Total time:  36 minutes.  I spent greater than  50% of the time for patient care and coordination on this date, including unit/floor time, and face-to-face time with the patient as per assessment and plan above.  Discussion included improving LUE weakness, hyperglycemia, discussed with hospitalist, add glipizide, and discontinue bowel medications.     Oliverio Bai M.D.

## 2022-02-28 NOTE — THERAPY
02/28/22 0859   Precautions   Precautions Fall Risk   Comments L hemiparesis, L BKA and prosthesis   Pain 0 - 10 Group   Therapist Pain Assessment 0   Cognition    Cognition / Consciousness WDL   Level of Consciousness Alert   ABS (Agitated Behavior Scale)   Agitated Behavior Scale Performed No   Gait Functional Level of Assist    Gait Level Of Assist Minimal Assist   Assistive Device Platform Walker   Distance (Feet) 160   # of Times Distance was Traveled 1   Deviation Step To;Decreased Heel Strike;Decreased Toe Off;Bradykinetic   Transfer Functional Level of Assist   Bed, Chair, Wheelchair Transfer Minimal Assist   Bed Chair Wheelchair Transfer Description Adaptive equipment;Increased time;Set-up of equipment;Supervision for safety;Verbal cueing  (PFWW)   Bed Mobility    Supine to Sit Standby Assist   Sit to Stand Minimal Assist   Scooting Contact Guard Assist   Neuro-Muscular Treatments   Neuro-Muscular Treatments Facilitation;Sequencing;Tactile Cuing;Tapping;Verbal Cuing;Weight Shift Right;Weight Shift Left;Anterior weight shift   Comments Sequencing seated scooting, sit to stand, sequencing gait with PFWW,   PT Total Time Spent   PT Individual Total Time Spent (Mins) 30   PT Charge Group   PT Gait Training 1   PT Therapeutic Activities 1   Physical Therapy   Daily Treatment     Patient Name: Israel Aviles  Age:  56 y.o., Sex:  male  Medical Record #: [id number]  Today's Date: 2/28/2022     Precautions  Precautions: Fall Risk  Comments: L hemiparesis, L BKA and prosthesis    Subjective    The patient was resting in bed.  He agreed to PT.     Objective    The patient needed set up and assistance with the sleeve for the prosthesis because of his less functional right arm.  He participated in sequencing sit to stand followed by gait training using a pfww.  He tolerated 160 FT x1 CGA and incidental weight shifting.  Transfer from bed to wheelchair required minimal to CGA for safety.    Assessment    The  patient tolerated the above activities for bed mobility, transfers and gait.  He needed assistance to put on the sleeve for the prosthesis.  Gait required CGA and incidental facilitation for weight shifting.  Strengths: Able to follow instructions,Alert and oriented,Effective communication skills,Good insight into deficits/needs,Independent prior level of function,Making steady progress towards goals,Manages pain appropriately,Motivated for self care and independence,Pleasant and cooperative,Supportive family,Willingly participates in therapeutic activities  Barriers: Decreased endurance,Generalized weakness,Impaired activity tolerance,Impaired balance,Limited mobility    Plan    Safety education, neuromuscular reeducation, therapeutic exercise, gait training as tolerated    Passport items to be completed:  [unfilled]    Physical Therapy Problems (Active)       Problem: Mobility       Dates: Start: 02/25/22         Goal: STG-Within one week, patient will ambulate 150 feet with standard FWW, gait belt, L BKA prosthetic, and CGA.       Dates: Start: 02/25/22            Goal: STG-Within one week, patient will ascend and descend four to six stairs with B railings, gait belt, L BKA prosthetic, and Min A.       Dates: Start: 02/25/22               Problem: Mobility Transfers       Dates: Start: 02/25/22         Goal: STG-Within one week, patient will perform bed mobility with bed rail, cueing, and SBA.       Dates: Start: 02/25/22            Goal: STG-Within one week, patient will sit to stand with standard FWW, gait belt, L BKA prosthetic, and CGA.       Dates: Start: 02/25/22            Goal: STG-Within one week, patient will transfer bed to chair with standard FWW, gait belt, L BKA prosthetic, and CGA.       Dates: Start: 02/25/22               Problem: PT-Long Term Goals       Dates: Start: 02/25/22         Goal: LTG-By discharge, patient will ambulate 300 feet with SPC and L BKA prosthetic at SPV level.        "Dates: Start: 02/25/22            Goal: LTG-By discharge, patient will transfer one surface to another with SPC and L BKA prosthetic at IND level.       Dates: Start: 02/25/22            Goal: LTG-By discharge, patient will perform home exercise program with handout and L BKA prosthetic at IND level.       Dates: Start: 02/25/22            Goal: LTG-By discharge, patient will transfer in/out of a car with SPC and L BKA prosthetic at SPV level.       Dates: Start: 02/25/22            Goal: LTG-By discharge, patient will ascend/descend a 4\" curb with SPC and L BKA prosthetic at SPV level.       Dates: Start: 02/25/22              "

## 2022-02-28 NOTE — PROGRESS NOTES
[facility] Medicine Daily Progress Note    Date of Service  2/28/2022    Chief Complaint:  Hypertension  Diabetes    Interval History:  No significant events over night.    Review of Systems  Review of Systems   Constitutional: Negative for chills and fever.   Respiratory: Negative for shortness of breath.    Cardiovascular: Negative for chest pain.   Gastrointestinal: Negative for abdominal pain, diarrhea, nausea and vomiting.   Psychiatric/Behavioral: The patient is not nervous/anxious.         Physical Exam  Temp:  [36.3 °C (97.3 °F)-36.9 °C (98.4 °F)] 36.9 °C (98.4 °F)  Pulse:  [75-80] 75  Resp:  [16-18] 18  BP: (125-134)/(77-85) 128/81  SpO2:  [94 %-99 %] 94 %    Physical Exam  Vitals and nursing note reviewed.   Constitutional:       Appearance: Normal appearance.   HENT:      Head: Atraumatic.   Eyes:      Conjunctiva/sclera: Conjunctivae normal.      Pupils: Pupils are equal, round, and reactive to light.   Cardiovascular:      Rate and Rhythm: Normal rate and regular rhythm.      Heart sounds: No murmur heard.  Pulmonary:      Effort: Pulmonary effort is normal.      Breath sounds: No stridor. No wheezing or rales.   Abdominal:      General: There is no distension.      Palpations: Abdomen is soft.      Tenderness: There is no abdominal tenderness.   Musculoskeletal:      Cervical back: Normal range of motion and neck supple.      Right lower leg: No edema.      Left lower leg: No edema.   Skin:     General: Skin is warm and dry.      Findings: No rash.   Neurological:      Mental Status: He is alert and oriented to person, place, and time.   Psychiatric:         Mood and Affect: Mood normal.         Behavior: Behavior normal.         Fluids    Intake/Output Summary (Last 24 hours) at 2/28/2022 1004  Last data filed at 2/28/2022 0900  Gross per 24 hour   Intake 360 ml   Output 1550 ml   Net -1190 ml       Laboratory                        Imaging    Assessment/Plan  * Right sided cerebral hemisphere  cerebrovascular accident (CVA) (HCC)- (present on admission)  Assessment & Plan  MRI brain: showed acute infarct in the right posterior limb of internal capsule adjacent to the basal ganglia.  On ASA and Plavix  On Lipitor    Below-knee amputation (HCC)- (present on admission)  Assessment & Plan  Hx of left BKA in 2019  2nd to diabetes and PAD    HTN (hypertension)- (present on admission)  Assessment & Plan  BP ok  On Toprol XL: 12.5 mg bid  Monitor    Vitamin D deficiency  Assessment & Plan  Vit D: 11  On supplements    Uncontrolled type 2 diabetes mellitus with hyperglycemia (HCC)- (present on admission)  Assessment & Plan  Hba1c: 8.2 (2/25) -- has been this elevated for a while  BS much better but did hit 88 (2/27 dinner) --> 97 --> 129 (2/28 am)  On Metformin: 1000 mg bid   On Glipizide: 5 mg qam --> will decrease to 2.5 mg qam (starting 3/1)  Note: home meds include Metformin 500 mg bid and Glipizide 2.5 mg bid  Cont to monitor

## 2022-02-28 NOTE — PROGRESS NOTES
"      Spiritual Care Note    Patient Information     Patient's Name: Israel Aviles   MRN: [id number]    YOB: 1965   Age and Gender: 56 y.o. male   Service Area:    Room (and Bed): [place] [address]   Ethnicity or Nationality:     Primary Language: English   Anabaptist/Spiritual preference: Latter day   Place of Residence: [place]   Family/Friends/Others Present:    Clinical Team Present:    Medical Diagnosis(-es)/Procedure(s):    Code Status: Full Code    Date of Admission: 2/24/2022   Length of Stay: 4 days        Spiritual Care Provider Information:  MELISSA Denis for Jack Koehler, Volunteer   [phone]   [email or web address]  Total time : 10 minutes    Spiritual Screen Results:    Gen Nursing  Spiritual Screen  Was spiritual care education provided to the patient?:  (COULDNT FIND IT ORDER PLACE)     Palliative Care  PC Anabaptist/Spiritual Screening  Was spiritual care education provided to the patient?:  (COULDNT FIND IT ORDER PLACE)      Encounter/Request Information  Encounter/Request Type   Visited With: Patient  Nature of the Visit: Initial,On shift  Referral From/ Origin of Request: Epic nursing  Referral To: Other /SCP    Religous Needs/Values       Spiritual Assessment   Spiritual Care Encounters  Observations/Symptoms: Accepting,Thankfulness  Interacton/Conversation: After several unsuccessful attempts to reach L[employer] day clergy,  visited pt and explained the situation. Pt stated that a visit is not necessary, he has all the spt he needs and that he does not require a visit.  Outcomes: Value/Dignity/Respect  Plan: Visit Upon Request    Notes:  Per notes provided by Chaplain Koehler, \"After several unsuccessful attempts to reach L[employer] day clergy,  visited pt and explained the situation. Pt stated that a visit is not necessary, he has all the spt he needs and that he does not require a clergy visit.\"            "

## 2022-03-01 LAB
GLUCOSE BLD STRIP.AUTO-MCNC: 112 MG/DL (ref 65–99)
GLUCOSE BLD STRIP.AUTO-MCNC: 114 MG/DL (ref 65–99)
GLUCOSE BLD STRIP.AUTO-MCNC: 131 MG/DL (ref 65–99)
GLUCOSE BLD STRIP.AUTO-MCNC: 235 MG/DL (ref 65–99)

## 2022-03-01 PROCEDURE — 700102 HCHG RX REV CODE 250 W/ 637 OVERRIDE(OP): Performed by: PHYSICAL MEDICINE & REHABILITATION

## 2022-03-01 PROCEDURE — A9270 NON-COVERED ITEM OR SERVICE: HCPCS | Performed by: HOSPITALIST

## 2022-03-01 PROCEDURE — 97535 SELF CARE MNGMENT TRAINING: CPT

## 2022-03-01 PROCEDURE — 97112 NEUROMUSCULAR REEDUCATION: CPT | Mod: CQ

## 2022-03-01 PROCEDURE — A9270 NON-COVERED ITEM OR SERVICE: HCPCS | Performed by: PHYSICAL MEDICINE & REHABILITATION

## 2022-03-01 PROCEDURE — 700111 HCHG RX REV CODE 636 W/ 250 OVERRIDE (IP): Performed by: PHYSICAL MEDICINE & REHABILITATION

## 2022-03-01 PROCEDURE — 99233 SBSQ HOSP IP/OBS HIGH 50: CPT | Performed by: PHYSICAL MEDICINE & REHABILITATION

## 2022-03-01 PROCEDURE — 97116 GAIT TRAINING THERAPY: CPT | Mod: CQ

## 2022-03-01 PROCEDURE — 97130 THER IVNTJ EA ADDL 15 MIN: CPT

## 2022-03-01 PROCEDURE — 82962 GLUCOSE BLOOD TEST: CPT | Mod: 91

## 2022-03-01 PROCEDURE — 700102 HCHG RX REV CODE 250 W/ 637 OVERRIDE(OP): Performed by: HOSPITALIST

## 2022-03-01 PROCEDURE — 770010 HCHG ROOM/CARE - REHAB SEMI PRIVAT*

## 2022-03-01 PROCEDURE — 97129 THER IVNTJ 1ST 15 MIN: CPT

## 2022-03-01 PROCEDURE — 97530 THERAPEUTIC ACTIVITIES: CPT | Mod: CQ

## 2022-03-01 PROCEDURE — 99232 SBSQ HOSP IP/OBS MODERATE 35: CPT | Performed by: HOSPITALIST

## 2022-03-01 RX ADMIN — Medication 4000 UNITS: at 08:17

## 2022-03-01 RX ADMIN — METFORMIN HYDROCHLORIDE 1000 MG: 500 TABLET ORAL at 07:24

## 2022-03-01 RX ADMIN — ENOXAPARIN SODIUM 40 MG: 40 INJECTION SUBCUTANEOUS at 07:22

## 2022-03-01 RX ADMIN — GABAPENTIN 300 MG: 300 CAPSULE ORAL at 20:43

## 2022-03-01 RX ADMIN — METOPROLOL SUCCINATE 12.5 MG: 25 TABLET, EXTENDED RELEASE ORAL at 20:43

## 2022-03-01 RX ADMIN — METOPROLOL SUCCINATE 12.5 MG: 25 TABLET, EXTENDED RELEASE ORAL at 07:25

## 2022-03-01 RX ADMIN — CLOPIDOGREL BISULFATE 75 MG: 75 TABLET ORAL at 07:25

## 2022-03-01 RX ADMIN — ATORVASTATIN CALCIUM 80 MG: 40 TABLET, FILM COATED ORAL at 07:25

## 2022-03-01 RX ADMIN — INSULIN LISPRO 4 UNITS: 100 INJECTION, SOLUTION INTRAVENOUS; SUBCUTANEOUS at 20:55

## 2022-03-01 RX ADMIN — OMEPRAZOLE 20 MG: 20 CAPSULE, DELAYED RELEASE ORAL at 07:23

## 2022-03-01 RX ADMIN — GLIPIZIDE 2.5 MG: 5 TABLET ORAL at 07:23

## 2022-03-01 RX ADMIN — ASPIRIN 81 MG: 81 TABLET, COATED ORAL at 07:25

## 2022-03-01 RX ADMIN — METFORMIN HYDROCHLORIDE 1000 MG: 500 TABLET ORAL at 17:48

## 2022-03-01 ASSESSMENT — ACTIVITIES OF DAILY LIVING (ADL)
TUB_SHOWER_TRANSFER_DESCRIPTION: GRAB BAR;SHOWER BENCH;INCREASED TIME
BED_CHAIR_WHEELCHAIR_TRANSFER_DESCRIPTION: ADAPTIVE EQUIPMENT;INCREASED TIME;SET-UP OF EQUIPMENT;SUPERVISION FOR SAFETY;VERBAL CUEING

## 2022-03-01 ASSESSMENT — GAIT ASSESSMENTS
ASSISTIVE DEVICE: FRONT WHEEL WALKER
DISTANCE (FEET): 300
GAIT LEVEL OF ASSIST: STANDBY ASSIST

## 2022-03-01 ASSESSMENT — ENCOUNTER SYMPTOMS
NAUSEA: 0
ABDOMINAL PAIN: 0
CHILLS: 0
VOMITING: 0
FEVER: 0
SHORTNESS OF BREATH: 0

## 2022-03-01 ASSESSMENT — PAIN DESCRIPTION - PAIN TYPE: TYPE: ACUTE PAIN

## 2022-03-01 NOTE — THERAPY
"Physical Therapy   Daily Treatment     Patient Name: Israel Aviles  Age:  56 y.o., Sex:  male  Medical Record #: [id number]  Today's Date: 2/28/2022     Precautions  Precautions: Fall Risk  Comments: L hemiparesis, L BKA and prosthesis    Subjective    \"Can we do the stimulation bike for my leg too? It really helped my arm move better.\" Pt referring to .     Objective       02/28/22 1531   Pain   Intervention Declines   Cognition    Level of Consciousness Alert   Sleep/Wake Cycle   Sleep & Rest Awake;Out of bed   Gait Functional Level of Assist    Gait Level Of Assist Standby Assist   Assistive Device Front Wheel Walker   Distance (Feet) 75   # of Times Distance was Traveled 3   Deviation Step To;Decreased Heel Strike;Decreased Toe Off;Bradykinetic   Transfer Functional Level of Assist   Bed, Chair, Wheelchair Transfer Contact Guard Assist   Bed Chair Wheelchair Transfer Description Adaptive equipment;Increased time;Initial preparation for task;Set-up of equipment   Toilet Transfers Contact Guard Assist   Toilet Transfer Description Grab bar;Increased time;Set-up of equipment   Standing Lower Body Exercises   Standing Lower Body Exercises Yes   Hamstring Curl 3 sets of 10;Bilateral   (active assist on the L due to poor control and isolation of movement)   Hip Flexion 3 sets of 10;Bilateral   Hip Abduction 3 sets of 10;Bilateral   Comments Pt performed in // with BUE support. Focus on concentric and eccentric control when moving the L, static stance support when moving the R.   Bed Mobility    Sit to Stand Contact Guard Assist   Neuro-Muscular Treatments   Neuro-Muscular Treatments Facilitation   Comments Facilitation with standing exercises to improve arc of motion and control.   Interdisciplinary Plan of Care Collaboration   IDT Collaboration with  Certified Nursing Assistant   Patient Position at End of Therapy Seated;Call Light within Reach   Collaboration Comments Pt left seated on the toilet with CNA " to assist. Pt to press call bell when done, requested some time.   PT Total Time Spent   PT Individual Total Time Spent (Mins) 60   PT Charge Group   PT Gait Training 2   PT Therapeutic Exercise 1   PT Neuromuscular Re-Education / Balance 1     Progressed to standard FWW with improvement in LUE control. Pt preferred to  the FWW briefly when advancing it to make his arm work a little harder. Pt CGA initially, progressed to standby assist with consistent foot placement and no losses of balance.    Assessment    Pt tolerated therapy session well with focus on LE motor control and progression of gait. Pt safe to use a standard FWW without the platform given adequate  strength and improved control. Worked on sequencing of sit>stands with the LUE holding the walker, and RUE pushing from the chair.  Strengths: Able to follow instructions,Alert and oriented,Effective communication skills,Good insight into deficits/needs,Independent prior level of function,Making steady progress towards goals,Manages pain appropriately,Motivated for self care and independence,Pleasant and cooperative,Supportive family,Willingly participates in therapeutic activities  Barriers: Decreased endurance,Generalized weakness,Impaired activity tolerance,Impaired balance,Limited mobility    Plan    Set up  for LLE per pt request (quads, hamstrings, glutes)    LLE strengthening and neuro re-ed with focus on hip flexors and glutes, gait with FWW versus quad cane, standing balance, stair assessment, bed mobility with genie technique     Passport items to be completed:  Get in/out of bed safely, in/out of a vehicle, safely use mobility device, walk or wheel around home/community, navigate up and down stairs, show how to get up/down from the ground, ensure home is accessible, demonstrate HEP, complete caregiver training    Physical Therapy Problems (Active)       Problem: Mobility       Dates: Start: 02/25/22         Goal: STG-Within one  "week, patient will ambulate 150 feet with standard FWW, gait belt, L BKA prosthetic, and CGA.       Dates: Start: 02/25/22            Goal: STG-Within one week, patient will ascend and descend four to six stairs with B railings, gait belt, L BKA prosthetic, and Min A.       Dates: Start: 02/25/22               Problem: Mobility Transfers       Dates: Start: 02/25/22         Goal: STG-Within one week, patient will perform bed mobility with bed rail, cueing, and SBA.       Dates: Start: 02/25/22            Goal: STG-Within one week, patient will sit to stand with standard FWW, gait belt, L BKA prosthetic, and CGA.       Dates: Start: 02/25/22            Goal: STG-Within one week, patient will transfer bed to chair with standard FWW, gait belt, L BKA prosthetic, and CGA.       Dates: Start: 02/25/22               Problem: PT-Long Term Goals       Dates: Start: 02/25/22         Goal: LTG-By discharge, patient will ambulate 300 feet with SPC and L BKA prosthetic at SPV level.       Dates: Start: 02/25/22            Goal: LTG-By discharge, patient will transfer one surface to another with SPC and L BKA prosthetic at IND level.       Dates: Start: 02/25/22            Goal: LTG-By discharge, patient will perform home exercise program with handout and L BKA prosthetic at IND level.       Dates: Start: 02/25/22            Goal: LTG-By discharge, patient will transfer in/out of a car with SPC and L BKA prosthetic at SPV level.       Dates: Start: 02/25/22            Goal: LTG-By discharge, patient will ascend/descend a 4\" curb with SPC and L BKA prosthetic at SPV level.       Dates: Start: 02/25/22              "

## 2022-03-01 NOTE — CARE PLAN
Call light in reach, needs anticipated and attended. Pt calls appropriately.    Problem: Bladder / Voiding  Goal: Patient will establish and maintain regular urinary output  Outcome: Progressing  Note: Oob to bathroom voiding freely without difficulty.     Problem: Diabetes Management  Goal: Patient's ability to maintain appropriate glucose levels will be maintained or improve  Outcome: Progressing  Note: FS at HS- 196, due coverage given, no s/s of hypo and hyperglycemia noted.   The patient is Stable - Low risk of patient condition declining or worsening    Shift Goals  Clinical Goals: safety  Patient Goals: strength    Progress made toward(s) clinical / shift goals:  Pt free from fall and injury.

## 2022-03-01 NOTE — PROGRESS NOTES
Rehab Progress Note     Encounter Date: 3/1/2022    CC: Decreased mobility, left sided weakness    Interval Events (Subjective)  Patient sitting up in room. He reports his left arm is doing much better. He is able to show how he can now raise it above his head. He reports ongoing poor coordination but improving strength. Discussed would have IDT later today. He wants to get as much as possible as he is going home and will mostly be alone. Denies NVD. Denies SOB    IDT Team Meeting 3/1/2022    IOliverio M.D., was present and led the interdisciplinary team conference on 3/1/2022.  I led the IDT conference and agree with the IDT conference documentation and plan of care as noted below.     RN:  Diet Regular   % Meal     Pain None   Sleep    Bowel Continent   Bladder Continent   In's & Out's    Last BM yesterday    PT:  Bed Mobility    Transfers CGA   Mobility Hari   Stairs    Platform walker?    OT:  Eating    Grooming    Bathing Hari   UB Dressing    LB Dressing Hari   Toileting    Shower & Transfer CGA   Improving left arm strength    SLP:  Decreased 30 minutes  Medication management and finances    CM:  Continues to work on disposition and DME needs.      DC/Disposition:  3/9/22    Objective:  VITAL SIGNS: /75   Pulse 77   Temp 36.5 °C (97.7 °F) (Oral)   Resp 18   Ht 1.829 m (6')   Wt 73.4 kg (161 lb 13.1 oz)   SpO2 95%   BMI 21.95 kg/m²   Gen: NAD  Psych: Mood and affect appropriate  CV: RRR, no edema  Resp: CTAB, no upper airway sounds  Abd: NTND  Neuro: AOx4, following commands  Unchanged from 2/28 in addition able to abduct left shoulder antigravity    Recent Results (from the past 72 hour(s))   POCT glucose device results    Collection Time: 02/26/22  5:21 PM   Result Value Ref Range    POC Glucose, Blood 252 (H) 65 - 99 mg/dL   POCT glucose device results    Collection Time: 02/26/22  8:41 PM   Result Value Ref Range    POC Glucose, Blood 165 (H) 65 - 99 mg/dL   POCT glucose  device results    Collection Time: 02/27/22  7:50 AM   Result Value Ref Range    POC Glucose, Blood 205 (H) 65 - 99 mg/dL   POCT glucose device results    Collection Time: 02/27/22 11:13 AM   Result Value Ref Range    POC Glucose, Blood 250 (H) 65 - 99 mg/dL   POCT glucose device results    Collection Time: 02/27/22  4:53 PM   Result Value Ref Range    POC Glucose, Blood 88 65 - 99 mg/dL   POCT glucose device results    Collection Time: 02/27/22  8:01 PM   Result Value Ref Range    POC Glucose, Blood 97 65 - 99 mg/dL   POCT glucose device results    Collection Time: 02/28/22  7:27 AM   Result Value Ref Range    POC Glucose, Blood 129 (H) 65 - 99 mg/dL   POCT glucose device results    Collection Time: 02/28/22 11:14 AM   Result Value Ref Range    POC Glucose, Blood 100 (H) 65 - 99 mg/dL   POCT glucose device results    Collection Time: 02/28/22  4:19 PM   Result Value Ref Range    POC Glucose, Blood 131 (H) 65 - 99 mg/dL   POCT glucose device results    Collection Time: 02/28/22  8:22 PM   Result Value Ref Range    POC Glucose, Blood 196 (H) 65 - 99 mg/dL   POCT glucose device results    Collection Time: 03/01/22  7:21 AM   Result Value Ref Range    POC Glucose, Blood 131 (H) 65 - 99 mg/dL   POCT glucose device results    Collection Time: 03/01/22 11:35 AM   Result Value Ref Range    POC Glucose, Blood 112 (H) 65 - 99 mg/dL       Current Facility-Administered Medications   Medication Frequency   • glipiZIDE (GLUCOTROL) tablet 2.5 mg QAM AC   • vitamin D3 (cholecalciferol) tablet 4,000 Units DAILY   • senna-docusate (PERICOLACE or SENOKOT S) 8.6-50 MG per tablet 2 Tablet BID W/MEALS PRN    And   • polyethylene glycol/lytes (MIRALAX) PACKET 1 Packet QDAY PRN    And   • magnesium hydroxide (MILK OF MAGNESIA) suspension 30 mL QDAY PRN    And   • bisacodyl (DULCOLAX) suppository 10 mg QDAY PRN   • metFORMIN (GLUCOPHAGE) tablet 1,000 mg BID WITH MEALS   • insulin LISPRO (AdmeLOG,HumaLOG) injection 4X/DAY ACHS   •  metoprolol SR (TOPROL XL) tablet 12.5 mg BID   • Respiratory Therapy Consult Continuous RT   • hydrALAZINE (APRESOLINE) tablet 25 mg Q8HRS PRN   • acetaminophen (Tylenol) tablet 650 mg Q4HRS PRN   • omeprazole (PRILOSEC) capsule 20 mg DAILY   • artificial tears ophthalmic solution 1 Drop PRN   • benzocaine-menthol (Cepacol) lozenge 1 Lozenge Q2HRS PRN   • mag hydrox-al hydrox-simeth (MAALOX PLUS ES or MYLANTA DS) suspension 20 mL Q2HRS PRN   • ondansetron (ZOFRAN ODT) dispertab 4 mg 4X/DAY PRN    Or   • ondansetron (ZOFRAN) syringe/vial injection 4 mg 4X/DAY PRN   • traZODone (DESYREL) tablet 50 mg QHS PRN   • sodium chloride (OCEAN) 0.65 % nasal spray 2 Spray PRN   • oxyCODONE immediate-release (ROXICODONE) tablet 5 mg Q3HRS PRN    Or   • oxyCODONE immediate release (ROXICODONE) tablet 10 mg Q3HRS PRN   • aspirin EC (ECOTRIN) tablet 81 mg DAILY   • atorvastatin (LIPITOR) tablet 80 mg DAILY   • clopidogrel (PLAVIX) tablet 75 mg DAILY   • enoxaparin (LOVENOX) inj 40 mg DAILY   • gabapentin (NEURONTIN) capsule 300 mg Q EVENING   • dextrose 50% (D50W) injection 50 mL Q15 MIN PRN   • promethazine (PHENERGAN) suppository 12.5-25 mg Q4HRS PRN   • promethazine (PHENERGAN) tablet 12.5-25 mg Q4HRS PRN       Orders Placed This Encounter   Procedures   • Diet Order Diet: Consistent CHO (Diabetic); Miscellaneous modifications: (optional): Vegetarian     Standing Status:   Standing     Number of Occurrences:   1     Order Specific Question:   Diet:     Answer:   Consistent CHO (Diabetic) [4]     Order Specific Question:   Miscellaneous modifications: (optional)     Answer:   Vegetarian [13]       Assessment:  Active Hospital Problems    Diagnosis    • *Right sided cerebral hemisphere cerebrovascular accident (CVA) (HCC)    • Below-knee amputation (HCC)    • CAD (coronary artery disease)    • HTN (hypertension)    • Vitamin D deficiency    • Uncontrolled type 2 diabetes mellitus with hyperglycemia (HCC)        Medical Decision  Making and Plan:  R CVA - Patient with acute posterior internal capsule infarct with weakness UE > LE complicated by previous L BKA. Patient is improving quickly  Addendum: IGC changed to left body (R Brain)   -PT and OT for mobility and ADLs  -SLP For cognitive screen     HTN/CAD - Patient on ASA and Plavix. Patient on Metoprolol 12.5 mg BID     DM2 with hyperglycemia - Patient on SSI and Metformin 500 mg BID on transfer.   -Hospitalist consulted. Metformin increased to 1000 mg BID. Into 250s, discussed with hospitlaist and Glipizide added 2.5 mg      L BKA - Limited due to weakness and weight of prosthetic. Monitor for skin breakdown     HLD - Patient on Atorvastatin 80 mg QHS     Neuropathy - Patient on Gabapentin 300 mg QHS     Vitamin D deficiency - 12 on admission. Start on 4000 U, up from 2000 U    DVT Ppx - Patient on Lovenox on transfer.     Total time:  36 minutes.  I spent greater than 50% of the time for patient care, counseling, and coordination on this date, including unit/floor time, and face-to-face time with the patient as per interval events and assessment and plan above. Topics discussed included discharge planning, improving left arm,  time, and discussed about spasticity. Patient was discussed separately in IDT today; please see details above.    Oliverio Bai M.D.

## 2022-03-01 NOTE — CARE PLAN
Problem: Bathing  Goal: STG-Within one week, patient will bathe with supervision.  Outcome: Not Met  Note: Pt cont to require min A to wash RUE     Problem: Dressing  Goal: STG-Within one week, patient will dress LB including prosthetic w/ min A.   Outcome: Met     Problem: Functional Transfers  Goal: STG-Within one week, patient will transfer to step in shower with CGA.  Outcome: Met

## 2022-03-01 NOTE — THERAPY
Physical Therapy   Daily Treatment     Patient Name: Israel Aviles  Age:  56 y.o., Sex:  male  Medical Record #: [id number]  Today's Date: 3/1/2022     Precautions  Precautions: Fall Risk  Comments: L hemiparesis, L BKA and prosthesis    Subjective    Pt seated in room upon arrival, agreeable to session.     Objective       03/01/22 1401   Pain 0 - 10 Group   Pain Rating Scale (NPRS) 0   Cognition    Level of Consciousness Alert   Gait Functional Level of Assist    Gait Level Of Assist Standby Assist   Assistive Device Front Wheel Walker   Distance (Feet) 300   # of Times Distance was Traveled 1   Deviation Step To;Decreased Heel Strike;Decreased Toe Off;Bradykinetic;Other (Comment)  (1 LOB during the end of amb)   Stairs Functional Level of Assist   Level of Assist with Stairs Moderate Assist   # of Stairs Climbed   (6 inches curb w/ FWW x3, LOB at the last one during descending, required total A to recover)   Stairs Description Assist device/equipment;Extra time;Supervision for safety;Verbal cueing   Transfer Functional Level of Assist   Bed, Chair, Wheelchair Transfer Contact Guard Assist   Bed Chair Wheelchair Transfer Description Adaptive equipment;Increased time;Set-up of equipment;Supervision for safety;Verbal cueing  (SPT w/ FWW)   Bed Mobility    Sit to Supine Contact Guard Assist   Sit to Stand Contact Guard Assist   Scooting Contact Guard Assist   Neuro-Muscular Treatments   Neuro-Muscular Treatments Electrical Stimulation   Comments set up on RT-300 FES for LLE neuro re-ed, ROM and sensory input.  Electrodes applied to L quads, HS and glutes. Muscle stimulation parameters assessed for each muscle group to pt tolerance to e-stim and muscle contraction observed.  Pt tolerated 27 mins of LE cycling, including warm up and cool down periods with results as follows: distance 4.51 miles, average asymmertry R 6%, total therapy time 27:11, time active 23:38   PT Total Time Spent   PT Individual Total Time  Spent (Mins) 90   PT Charge Group   PT Gait Training 2   PT Neuromuscular Re-Education / Balance 2   PT Therapeutic Activities 2   Supervising Physical Therapist Jose Hernandez       Assessment    Pt tolerated session well focusing on amb w/ FWW, neuro re-ed on  for LLE. LOB once during amb, was able to recover w/ Hari and LOB during 6 inches curb training, required total A to recover. Pt initially descending w/ RLE and therapist cues for descending w/ LLE. Pt was trying to correct the movement and prosthetic suddenly buckled down and pt was not able to recover by himself. Required total A to stand and pivot toward w/c. Pt is interested in purchasing NMES unit to use at home.     Strengths: Able to follow instructions,Alert and oriented,Effective communication skills,Good insight into deficits/needs,Independent prior level of function,Making steady progress towards goals,Manages pain appropriately,Motivated for self care and independence,Pleasant and cooperative,Supportive family,Willingly participates in therapeutic activities  Barriers: Decreased endurance,Generalized weakness,Impaired activity tolerance,Impaired balance,Limited mobility    Plan    [place] NMES unit. LLE strengthening and neuro re-ed with focus on hip flexors and glutes, gait with FWW versus quad cane, standing balance, stair assessment, bed mobility with genie technique     Passport items to be completed:  Get in/out of bed safely, in/out of a vehicle, safely use mobility device, walk or wheel around home/community, navigate up and down stairs, show how to get up/down from the ground, ensure home is accessible, demonstrate HEP, complete caregiver training    Physical Therapy Problems (Active)       Problem: Mobility       Dates: Start: 02/25/22         Goal: STG-Within one week, patient will ambulate 150 feet with standard FWW, gait belt, L BKA prosthetic, and CGA.       Dates: Start: 02/25/22            Goal: STG-Within one week, patient will  "ascend and descend four to six stairs with B railings, gait belt, L BKA prosthetic, and Min A.       Dates: Start: 02/25/22               Problem: Mobility Transfers       Dates: Start: 02/25/22         Goal: STG-Within one week, patient will perform bed mobility with bed rail, cueing, and SBA.       Dates: Start: 02/25/22               Problem: PT-Long Term Goals       Dates: Start: 02/25/22         Goal: LTG-By discharge, patient will ambulate 300 feet with SPC and L BKA prosthetic at SPV level.       Dates: Start: 02/25/22            Goal: LTG-By discharge, patient will transfer one surface to another with SPC and L BKA prosthetic at IND level.       Dates: Start: 02/25/22            Goal: LTG-By discharge, patient will perform home exercise program with handout and L BKA prosthetic at IND level.       Dates: Start: 02/25/22            Goal: LTG-By discharge, patient will transfer in/out of a car with SPC and L BKA prosthetic at SPV level.       Dates: Start: 02/25/22            Goal: LTG-By discharge, patient will ascend/descend a 4\" curb with SPC and L BKA prosthetic at SPV level.       Dates: Start: 02/25/22              "

## 2022-03-01 NOTE — THERAPY
"Occupational Therapy  Daily Treatment     Patient Name: Israel Aviles  Age:  56 y.o., Sex:  male  Medical Record #: [id number]  Today's Date: 3/1/2022     Precautions  Precautions: Fall Risk  Comments: L hemiparesis, L BKA and prosthesis         Subjective    \"I'm going to try the stimulation on my leg today!\"     Objective       03/01/22 0931   Precautions   Precautions Fall Risk   Comments L hemiparesis, L BKA and prosthesis   Functional Level of Assist   Eating Modified Independent   Eating Description Set-up of equipment or meal/tube feeding   Grooming Modified Independent;Seated   Grooming Description Seated in wheelchair at sink;Verbal cueing   Bathing Minimal Assist   Bathing Description Grab bar;Hand held shower;Tub bench;Supervision for safety;Verbal cueing  (assist with RUE)   Upper Body Dressing Supervision   Upper Body Dressing Description Verbal cueing   Lower Body Dressing Minimal Assist   Lower Body Dressing Description Set-up of equipment;Increased time  (assist to pull over hips while standing at // bar)   Tub / Shower Transfers Contact Guard Assist   Tub Shower Transfer Description Grab bar;Shower bench;Increased time   Interdisciplinary Plan of Care Collaboration   Patient Position at End of Therapy Seated;Phone within Reach;Tray Table within Reach;Call Light within Reach   OT Total Time Spent   OT Individual Total Time Spent (Mins) 30   OT Charge Group   OT Self Care / ADL 2       Assessment    Pt tolerated session well focused on ADLs. Pt demonstrated increased independence in bathing, dressing, and grooming. Pt cont to demonstrate increased function of LUE. Pt cont to be interested in  and reports he is excited to use it on the leg today.   Strengths: Supportive family,Willingly participates in therapeutic activities,Pleasant and cooperative,Motivated for self care and independence,Good endurance,Good balance,Effective communication skills,Able to follow instructions  Barriers: " Decreased endurance,Hemiparesis,Limited mobility,Impaired balance    Plan    LUE neuro re-education, ADLs, balance, functional transfers/mobility   w/ tech 3X week      Passport items to be completed:  Perform bathroom transfers, complete dressing, complete feeding, get ready for the day, prepare a simple meal, participate in household tasks, adapt home for safety needs, demonstrate home exercise program, complete caregiver training     Occupational Therapy Goals (Active)       Problem: Bathing       Dates: Start: 02/25/22         Goal: STG-Within one week, patient will bathe with supervision.       Dates: Start: 02/25/22         Goal Note filed on 03/01/22 1017 by Jagruti Granados, OT       Pt cont to require min A to wash RUE                 Problem: OT Long Term Goals       Dates: Start: 02/25/22         Goal: LTG-By discharge, patient will complete basic self care tasks with mod I to supervision.        Dates: Start: 02/25/22            Goal: LTG-By discharge, patient will perform bathroom transfers with mod I to supervision.        Dates: Start: 02/25/22

## 2022-03-01 NOTE — CARE PLAN
Problem: Problem Solving STGs  Goal: STG-Within one week, patient will alternating attn tasks 80% given min verbal cues.    Outcome: Met     Problem: Memory STGs  Goal: STG-Within one week, patient will recall daily events and safety strategies with 80% accuracy given min verbal cues and use of memory book.   Outcome: Met

## 2022-03-01 NOTE — THERAPY
Speech Language Pathology  Daily Treatment     Patient Name: Israel Aviles  Age:  56 y.o., Sex:  male  Medical Record #: [id number]  Today's Date: 3/1/2022     Precautions  Precautions: Fall Risk  Comments: L hemiparesis, L BKA and prosthesis    Subjective    Patient was willing to participate in speech therapy. Denies cognitive deficits.      Objective       03/01/22 0832   Cognition   Moderate Attention Within Functional Limits (6-7)   Executive Functioning / Organization Supervision (5)   Functional Math / Financial Management Supervision (5)   Medication Management  Supervision (5)   SLP Total Time Spent   SLP Individual Total Time Spent (Mins) 60   Treatment Charges   SLP Cognitive Skill Development First 15 Minutes 1   SLP Cognitive Skill Development Additional 15 Minutes 3       Assessment    Completed receipt task with 95% accuracy. One error made on gratuity, however language barrier led to error.   Initiated medication list. Patient was able to recall purpose of all medications reviewed. Reports taking medications from bottles at home and is not wanting to use pill box at d/c.   Discussed decreasing ST, which patient was in agreement.     Strengths: Willingly participates in therapeutic activities,Motivated for self care and independence,Pleasant and cooperative,Supportive family,Making steady progress towards goals  Barriers: Impaired functional cognition    Plan    Decrease ST to 30 minutes, target executive function, med management, and financial management      Speech Therapy Problems (Active)       Problem: Problem Solving STGs       Dates: Start: 02/25/22         Goal: STG-Within one week, patient will complete executive function tasks with 80% accuracy given min verbal cues.         Dates: Start: 02/25/22               Problem: Speech/Swallowing LTGs       Dates: Start: 02/25/22         Goal: LTG-By discharge, patient will solve complex problem Zac for safe discharge home.       Dates: Start:  02/25/22

## 2022-03-01 NOTE — CARE PLAN
Problem: Skin Integrity  Goal: Patient's skin integrity will be maintained or improve  Note: Patient's skin is maintained and free from new or accidental injury this shift.  Will continue to monitor.      Problem: Diabetes Management  Goal: Patient's ability to maintain appropriate glucose levels will be maintained or improve  Note: Blood sugars are within normal range this shift. Prior to breakfast, his blood sugar was 112, and prior to lunch it was 131. Dr. Bai aware. Will continue to monitor.    The patient is Stable - Low risk of patient condition declining or worsening    Shift Goals  Clinical Goals: safety  Patient Goals: strength

## 2022-03-01 NOTE — CARE PLAN
Problem: Mobility  Goal: STG-Within one week, patient will ambulate 150 feet with standard FWW, gait belt, L BKA prosthetic, and CGA.  Outcome: Not Met  Goal: STG-Within one week, patient will ascend and descend four to six stairs with B railings, gait belt, L BKA prosthetic, and Min A.  Outcome: Not Met     Problem: Mobility Transfers  Goal: STG-Within one week, patient will perform bed mobility with bed rail, cueing, and SBA.  Outcome: Not Met     Problem: Mobility Transfers  Goal: STG-Within one week, patient will sit to stand with standard FWW, gait belt, L BKA prosthetic, and CGA.  Outcome: Met  Goal: STG-Within one week, patient will transfer bed to chair with standard FWW, gait belt, L BKA prosthetic, and CGA.  Outcome: Met

## 2022-03-01 NOTE — THERAPY
"Speech Language Pathology  Daily Treatment     Patient Name: Israel Aviles  Age:  56 y.o., Sex:  male  Medical Record #: [id number]  Today's Date: 3/1/2022     Precautions  Precautions: Fall Risk  Comments: L hemiparesis, L BKA and prosthesis    Subjective    Pt pleasant and cooperative     Objective       03/01/22 1303   SLP Total Time Spent   SLP Individual Total Time Spent (Mins) 30   Treatment Charges   SLP Cognitive Skill Development First 15 Minutes 1   SLP Cognitive Skill Development Additional 15 Minutes 1       Assessment    Continued written medication list with current medications. Pt with frequent questions re: meds given and their dosage, pt persistent that he takes different amounts at home and \"that's what works for me.\" List formulated for questions to ask MD. Pt required MIN cues overall for stating purpose of current medications.     Strengths: Willingly participates in therapeutic activities,Motivated for self care and independence,Pleasant and cooperative,Supportive family,Making steady progress towards goals  Barriers: Impaired functional cognition    Plan    Cont addressing meds, consider functional medication sort     Speech Therapy Problems (Active)       Problem: Problem Solving STGs       Dates: Start: 02/25/22         Goal: STG-Within one week, patient will complete executive function tasks with 80% accuracy given min verbal cues.         Dates: Start: 02/25/22               Problem: Speech/Swallowing LTGs       Dates: Start: 02/25/22         Goal: LTG-By discharge, patient will solve complex problem Zac for safe discharge home.       Dates: Start: 02/25/22               "

## 2022-03-02 PROBLEM — D69.6 THROMBOCYTOPENIA (HCC): Status: ACTIVE | Noted: 2022-03-02

## 2022-03-02 LAB
ANION GAP SERPL CALC-SCNC: 11 MMOL/L (ref 7–16)
BUN SERPL-MCNC: 18 MG/DL (ref 8–22)
CALCIUM SERPL-MCNC: 9.3 MG/DL (ref 8.5–10.5)
CHLORIDE SERPL-SCNC: 101 MMOL/L (ref 96–112)
CO2 SERPL-SCNC: 26 MMOL/L (ref 20–33)
CREAT SERPL-MCNC: 0.74 MG/DL (ref 0.5–1.4)
ERYTHROCYTE [DISTWIDTH] IN BLOOD BY AUTOMATED COUNT: 42.3 FL (ref 35.9–50)
GLUCOSE BLD STRIP.AUTO-MCNC: 138 MG/DL (ref 65–99)
GLUCOSE BLD STRIP.AUTO-MCNC: 145 MG/DL (ref 65–99)
GLUCOSE BLD STRIP.AUTO-MCNC: 154 MG/DL (ref 65–99)
GLUCOSE BLD STRIP.AUTO-MCNC: 182 MG/DL (ref 65–99)
GLUCOSE SERPL-MCNC: 134 MG/DL (ref 65–99)
HCT VFR BLD AUTO: 38.4 % (ref 42–52)
HGB BLD-MCNC: 12.9 G/DL (ref 14–18)
MCH RBC QN AUTO: 30.7 PG (ref 27–33)
MCHC RBC AUTO-ENTMCNC: 33.6 G/DL (ref 33.7–35.3)
MCV RBC AUTO: 91.4 FL (ref 81.4–97.8)
PLATELET # BLD AUTO: 164 K/UL (ref 164–446)
PMV BLD AUTO: 10.5 FL (ref 9–12.9)
POTASSIUM SERPL-SCNC: 4.1 MMOL/L (ref 3.6–5.5)
RBC # BLD AUTO: 4.2 M/UL (ref 4.7–6.1)
SODIUM SERPL-SCNC: 138 MMOL/L (ref 135–145)
WBC # BLD AUTO: 5.1 K/UL (ref 4.8–10.8)

## 2022-03-02 PROCEDURE — 700102 HCHG RX REV CODE 250 W/ 637 OVERRIDE(OP): Performed by: PHYSICAL MEDICINE & REHABILITATION

## 2022-03-02 PROCEDURE — 97112 NEUROMUSCULAR REEDUCATION: CPT | Mod: CQ

## 2022-03-02 PROCEDURE — 700102 HCHG RX REV CODE 250 W/ 637 OVERRIDE(OP): Performed by: HOSPITALIST

## 2022-03-02 PROCEDURE — A9270 NON-COVERED ITEM OR SERVICE: HCPCS | Performed by: PHYSICAL MEDICINE & REHABILITATION

## 2022-03-02 PROCEDURE — 97112 NEUROMUSCULAR REEDUCATION: CPT

## 2022-03-02 PROCEDURE — 80048 BASIC METABOLIC PNL TOTAL CA: CPT

## 2022-03-02 PROCEDURE — 36415 COLL VENOUS BLD VENIPUNCTURE: CPT

## 2022-03-02 PROCEDURE — 99232 SBSQ HOSP IP/OBS MODERATE 35: CPT | Performed by: HOSPITALIST

## 2022-03-02 PROCEDURE — A9270 NON-COVERED ITEM OR SERVICE: HCPCS | Performed by: HOSPITALIST

## 2022-03-02 PROCEDURE — 97130 THER IVNTJ EA ADDL 15 MIN: CPT

## 2022-03-02 PROCEDURE — 97110 THERAPEUTIC EXERCISES: CPT

## 2022-03-02 PROCEDURE — 97129 THER IVNTJ 1ST 15 MIN: CPT

## 2022-03-02 PROCEDURE — 97032 APPL MODALITY 1+ESTIM EA 15: CPT

## 2022-03-02 PROCEDURE — 770010 HCHG ROOM/CARE - REHAB SEMI PRIVAT*

## 2022-03-02 PROCEDURE — 82962 GLUCOSE BLOOD TEST: CPT | Mod: 91

## 2022-03-02 PROCEDURE — 97530 THERAPEUTIC ACTIVITIES: CPT

## 2022-03-02 PROCEDURE — 99232 SBSQ HOSP IP/OBS MODERATE 35: CPT | Performed by: PHYSICAL MEDICINE & REHABILITATION

## 2022-03-02 PROCEDURE — 97116 GAIT TRAINING THERAPY: CPT

## 2022-03-02 PROCEDURE — 85027 COMPLETE CBC AUTOMATED: CPT

## 2022-03-02 PROCEDURE — 97530 THERAPEUTIC ACTIVITIES: CPT | Mod: CQ

## 2022-03-02 PROCEDURE — 700111 HCHG RX REV CODE 636 W/ 250 OVERRIDE (IP): Performed by: PHYSICAL MEDICINE & REHABILITATION

## 2022-03-02 RX ORDER — GLIPIZIDE 5 MG/1
2.5 TABLET ORAL
Status: DISCONTINUED | OUTPATIENT
Start: 2022-03-03 | End: 2022-03-12 | Stop reason: HOSPADM

## 2022-03-02 RX ADMIN — ASPIRIN 81 MG: 81 TABLET, COATED ORAL at 08:39

## 2022-03-02 RX ADMIN — CLOPIDOGREL BISULFATE 75 MG: 75 TABLET ORAL at 08:39

## 2022-03-02 RX ADMIN — METOPROLOL SUCCINATE 12.5 MG: 25 TABLET, EXTENDED RELEASE ORAL at 20:22

## 2022-03-02 RX ADMIN — INSULIN LISPRO 2 UNITS: 100 INJECTION, SOLUTION INTRAVENOUS; SUBCUTANEOUS at 17:24

## 2022-03-02 RX ADMIN — METFORMIN HYDROCHLORIDE 1000 MG: 500 TABLET ORAL at 07:36

## 2022-03-02 RX ADMIN — GABAPENTIN 300 MG: 300 CAPSULE ORAL at 20:22

## 2022-03-02 RX ADMIN — INSULIN LISPRO 2 UNITS: 100 INJECTION, SOLUTION INTRAVENOUS; SUBCUTANEOUS at 20:28

## 2022-03-02 RX ADMIN — METFORMIN HYDROCHLORIDE 500 MG: 500 TABLET ORAL at 17:36

## 2022-03-02 RX ADMIN — METOPROLOL SUCCINATE 12.5 MG: 25 TABLET, EXTENDED RELEASE ORAL at 08:38

## 2022-03-02 RX ADMIN — ATORVASTATIN CALCIUM 80 MG: 40 TABLET, FILM COATED ORAL at 08:38

## 2022-03-02 RX ADMIN — Medication 4000 UNITS: at 08:38

## 2022-03-02 RX ADMIN — ENOXAPARIN SODIUM 40 MG: 40 INJECTION SUBCUTANEOUS at 08:38

## 2022-03-02 RX ADMIN — GLIPIZIDE 2.5 MG: 5 TABLET ORAL at 07:33

## 2022-03-02 ASSESSMENT — GAIT ASSESSMENTS
DEVIATION: STEP TO;DECREASED HEEL STRIKE;DECREASED TOE OFF;BRADYKINETIC
GAIT LEVEL OF ASSIST: CONTACT GUARD ASSIST
DISTANCE (FEET): 150
ASSISTIVE DEVICE: FRONT WHEEL WALKER

## 2022-03-02 ASSESSMENT — ENCOUNTER SYMPTOMS
FEVER: 0
POLYDIPSIA: 0
EYES NEGATIVE: 1
ABDOMINAL PAIN: 0
COUGH: 0
CHILLS: 0
FOCAL WEAKNESS: 1
VOMITING: 0
SHORTNESS OF BREATH: 0
BRUISES/BLEEDS EASILY: 0
NAUSEA: 0
MUSCULOSKELETAL NEGATIVE: 1
PALPITATIONS: 0

## 2022-03-02 ASSESSMENT — ACTIVITIES OF DAILY LIVING (ADL)
BED_CHAIR_WHEELCHAIR_TRANSFER_DESCRIPTION: ADAPTIVE EQUIPMENT;INCREASED TIME;SET-UP OF EQUIPMENT;SUPERVISION FOR SAFETY;VERBAL CUEING

## 2022-03-02 NOTE — THERAPY
Occupational Therapy  Daily Treatment     Patient Name: Israel Aviles  Age:  56 y.o., Sex:  male  Medical Record #: [id number]  Today's Date: 3/2/2022     Precautions  Precautions: Fall Risk  Comments: L hemiparesis, L BKA and prosthesis         Subjective    Pt agreeable to OT session     Objective       03/02/22 1031   Precautions   Precautions Fall Risk   Comments L hemiparesis, L BKA and prosthesis   Neuro-Muscular Treatments   Neuro-Muscular Treatments Electrical Stimulation;Postural Facilitation;Taping   Comments  for LUE see note for details; taping of LUE for shoulder positioning   Interdisciplinary Plan of Care Collaboration   IDT Collaboration with  Family / Caregiver   Patient Position at End of Therapy Seated;Family / Friend in Room;Tray Table within Reach;Phone within Reach   Collaboration Comments So present at end of session   OT Total Time Spent   OT Individual Total Time Spent (Mins) 60   OT Charge Group   OT Electrical Stimulation Attended 1   OT Neuromuscular Re-education / Balance 2   OT Therapy Activity 1   Pt set up on RT-300 FES for LUE neuro re-ed, ROM and sensory input.  Electrodes applied to L scap stabilizers, shoulder, biceps, triceps, and wrist flexors/extensors. LUE placed in arm trough with ace wrap applied at L wrist for increased stability while allowing for finger flex/ext with stimulation. Muscle stimulation parameters assessed for each muscle group to pt tolerance to e-stim and muscle contraction observed.  Pt tolerated 34 min 30 sec of cycling, including warm up and cool down periods with results as follows: distance travelled: 4.01 miles, energy expended: 0.7 kCal, energy per hour: 1.4 kCal/hr, avg power: 1.7 W, avg asymmetry: Right 2%, Total active time: 28 min 40 sec.  Pt tolerated well with no c/o pain    Pt reported pain in L shoulder. Pt's shoulder was taped in [place] tri-pull formation to support shoulder joint. Pt and pt's SO were educated on K tape and  purpose of taping formation.   Assessment    Pt tolerated session well focused on  and taping LUE shoulder. Pt cont to be very motivated to increase LUE function. Pt demonstrated increased strength of LUE during  exercise and increased active movement.   Strengths: Supportive family,Willingly participates in therapeutic activities,Pleasant and cooperative,Motivated for self care and independence,Good endurance,Good balance,Effective communication skills,Able to follow instructions  Barriers: Decreased endurance,Hemiparesis,Limited mobility,Impaired balance    Plan    LUE neuro re-education, ADLs, balance, functional transfers/mobility   w/ tech 3X week      Passport items to be completed:  Perform bathroom transfers, complete dressing, complete feeding, get ready for the day, prepare a simple meal, participate in household tasks, adapt home for safety needs, demonstrate home exercise program, complete caregiver training     Occupational Therapy Goals (Active)       Problem: Bathing       Dates: Start: 02/25/22         Goal: STG-Within one week, patient will bathe with supervision.       Dates: Start: 02/25/22         Goal Note filed on 03/01/22 1017 by Jagruti Granados OT       Pt cont to require min A to wash RUE                 Problem: OT Long Term Goals       Dates: Start: 02/25/22         Goal: LTG-By discharge, patient will complete basic self care tasks with mod I to supervision.        Dates: Start: 02/25/22            Goal: LTG-By discharge, patient will perform bathroom transfers with mod I to supervision.        Dates: Start: 02/25/22

## 2022-03-02 NOTE — THERAPY
Physical Therapy   Daily Treatment     Patient Name: Israel Aviles  Age:  56 y.o., Sex:  male  Medical Record #: [id number]  Today's Date: 3/2/2022     Precautions  Precautions: Fall Risk  Comments: L hemiparesis, L BKA and prosthesis    Subjective    Pt in bed finishing breakfast upon arrival, agreeable to session.  RN delivered meds at the beginning of session and pt c/o lightheaded during session, checked vitals and it was WNL, RN notified at the end of session.     Objective       03/02/22 0831   Vitals   Pulse 91   Patient BP Position Sitting   Blood Pressure 126/80   Respiration 18   Pulse Oximetry 98 %   O2 (LPM) 0   O2 Delivery Device None - Room Air   Pain 0 - 10 Group   Pain Rating Scale (NPRS) 0   Cognition    Level of Consciousness Alert   Gait Functional Level of Assist    Gait Level Of Assist Contact Guard Assist   Assistive Device Front Wheel Walker   Distance (Feet) 150   # of Times Distance was Traveled 1   Deviation Step To;Decreased Heel Strike;Decreased Toe Off;Bradykinetic   Transfer Functional Level of Assist   Bed, Chair, Wheelchair Transfer Contact Guard Assist   Bed Chair Wheelchair Transfer Description Adaptive equipment;Increased time;Set-up of equipment;Supervision for safety;Verbal cueing  (SPT w/ FWW)   Bed Mobility    Supine to Sit Standby Assist   Sit to Stand Contact Guard Assist   Scooting Standby Assist   Neuro-Muscular Treatments   Neuro-Muscular Treatments Electrical Stimulation   Comments education on [place] use of NMES unit, electrodes placed on hip flexor w/ AROM for hip flex during active stimulation for 12 mins   Interdisciplinary Plan of Care Collaboration   IDT Collaboration with  Occupational Therapist;Nursing   Patient Position at End of Therapy Seated;Call Light within Reach;Tray Table within Reach;Phone within Reach   Collaboration Comments CLOF   PT Total Time Spent   PT Individual Total Time Spent (Mins) 60   PT Charge Group   PT Gait Training 1   PT  Neuromuscular Re-Education / Balance 1   PT Therapeutic Activities 2   Supervising Physical Therapist Jose Hernandez     Pt donned prothesis sitting on EOB w/ SPV, required cues for LUE for pronation while sitting on EOB during activity to assist in balancing.  Xfer training w/ FWW w/c <> therapy mat leading w/ R side x2.    Assessment    Pt tolerated session fairly due to feeling lightheaded after amb, required CGA today comparing to SBA yesterday due to gait unsteadiness. Education on use of NMES during AROM for neuro-roxana and pt showing interest to purchase for home use after d/c  Strengths: Able to follow instructions,Alert and oriented,Effective communication skills,Good insight into deficits/needs,Independent prior level of function,Making steady progress towards goals,Manages pain appropriately,Motivated for self care and independence,Pleasant and cooperative,Supportive family,Willingly participates in therapeutic activities  Barriers: Decreased endurance,Generalized weakness,Impaired activity tolerance,Impaired balance,Limited mobility    Plan    LLE strengthening and neuro re-ed with focus on hip flexors and glutes, gait with FWW, standing balance, stair assessment, bed mobility with genie technique, cont curb training w/ FWW     Passport items to be completed:  Get in/out of bed safely, in/out of a vehicle, safely use mobility device, walk or wheel around home/community, navigate up and down stairs, show how to get up/down from the ground, ensure home is accessible, demonstrate HEP, complete caregiver training    Physical Therapy Problems (Active)       Problem: Mobility       Dates: Start: 02/25/22         Goal: STG-Within one week, patient will ambulate 150 feet with standard FWW, gait belt, L BKA prosthetic, and CGA.       Dates: Start: 02/25/22            Goal: STG-Within one week, patient will ascend and descend four to six stairs with B railings, gait belt, L BKA prosthetic, and Min A.       Dates:  "Start: 02/25/22               Problem: Mobility Transfers       Dates: Start: 02/25/22         Goal: STG-Within one week, patient will perform bed mobility with bed rail, cueing, and SBA.       Dates: Start: 02/25/22               Problem: PT-Long Term Goals       Dates: Start: 02/25/22         Goal: LTG-By discharge, patient will ambulate 300 feet with SPC and L BKA prosthetic at SPV level.       Dates: Start: 02/25/22            Goal: LTG-By discharge, patient will transfer one surface to another with SPC and L BKA prosthetic at IND level.       Dates: Start: 02/25/22            Goal: LTG-By discharge, patient will perform home exercise program with handout and L BKA prosthetic at IND level.       Dates: Start: 02/25/22            Goal: LTG-By discharge, patient will transfer in/out of a car with SPC and L BKA prosthetic at SPV level.       Dates: Start: 02/25/22            Goal: LTG-By discharge, patient will ascend/descend a 4\" curb with SPC and L BKA prosthetic at SPV level.       Dates: Start: 02/25/22              "

## 2022-03-02 NOTE — PROGRESS NOTES
Rehab Progress Note     Encounter Date: 3/2/2022    CC: Decreased mobility, left sided weakness    Interval Events (Subjective)  Patient sitting up in room. Repeat labs today with improving anemia and good control of blood sugars. Discussed about discharge next week and he has concerns. He is going to be alone. Discussed that we will have another meeting next Tuesday and if any concerns we will discuss it then. Denies spasticity. Continues to have improvement in strength. Denies NVD.     IDT Team Meeting 3/1/2022  DC/Disposition:  3/9/22    Objective:  VITAL SIGNS: /81   Pulse 89   Temp 36.7 °C (98.1 °F) (Oral)   Resp 18   Ht 1.829 m (6')   Wt 73.4 kg (161 lb 13.1 oz)   SpO2 98%   BMI 21.95 kg/m²   Gen: NAD  Psych: Mood and affect appropriate  CV: RRR, no edema  Resp: CTAB, no upper airway sounds  Abd: NTND  Neuro: AOx4, 4/5 L proximal UE    Recent Results (from the past 72 hour(s))   POCT glucose device results    Collection Time: 02/27/22  4:53 PM   Result Value Ref Range    POC Glucose, Blood 88 65 - 99 mg/dL   POCT glucose device results    Collection Time: 02/27/22  8:01 PM   Result Value Ref Range    POC Glucose, Blood 97 65 - 99 mg/dL   POCT glucose device results    Collection Time: 02/28/22  7:27 AM   Result Value Ref Range    POC Glucose, Blood 129 (H) 65 - 99 mg/dL   POCT glucose device results    Collection Time: 02/28/22 11:14 AM   Result Value Ref Range    POC Glucose, Blood 100 (H) 65 - 99 mg/dL   POCT glucose device results    Collection Time: 02/28/22  4:19 PM   Result Value Ref Range    POC Glucose, Blood 131 (H) 65 - 99 mg/dL   POCT glucose device results    Collection Time: 02/28/22  8:22 PM   Result Value Ref Range    POC Glucose, Blood 196 (H) 65 - 99 mg/dL   POCT glucose device results    Collection Time: 03/01/22  7:21 AM   Result Value Ref Range    POC Glucose, Blood 131 (H) 65 - 99 mg/dL   POCT glucose device results    Collection Time: 03/01/22 11:35 AM   Result Value Ref  Range    POC Glucose, Blood 112 (H) 65 - 99 mg/dL   POCT glucose device results    Collection Time: 03/01/22  4:50 PM   Result Value Ref Range    POC Glucose, Blood 114 (H) 65 - 99 mg/dL   POCT glucose device results    Collection Time: 03/01/22  8:52 PM   Result Value Ref Range    POC Glucose, Blood 235 (H) 65 - 99 mg/dL   CBC WITHOUT DIFFERENTIAL    Collection Time: 03/02/22  6:04 AM   Result Value Ref Range    WBC 5.1 4.8 - 10.8 K/uL    RBC 4.20 (L) 4.70 - 6.10 M/uL    Hemoglobin 12.9 (L) 14.0 - 18.0 g/dL    Hematocrit 38.4 (L) 42.0 - 52.0 %    MCV 91.4 81.4 - 97.8 fL    MCH 30.7 27.0 - 33.0 pg    MCHC 33.6 (L) 33.7 - 35.3 g/dL    RDW 42.3 35.9 - 50.0 fL    Platelet Count 164 164 - 446 K/uL    MPV 10.5 9.0 - 12.9 fL   Basic Metabolic Panel    Collection Time: 03/02/22  6:04 AM   Result Value Ref Range    Sodium 138 135 - 145 mmol/L    Potassium 4.1 3.6 - 5.5 mmol/L    Chloride 101 96 - 112 mmol/L    Co2 26 20 - 33 mmol/L    Glucose 134 (H) 65 - 99 mg/dL    Bun 18 8 - 22 mg/dL    Creatinine 0.74 0.50 - 1.40 mg/dL    Calcium 9.3 8.5 - 10.5 mg/dL    Anion Gap 11.0 7.0 - 16.0   ESTIMATED GFR    Collection Time: 03/02/22  6:04 AM   Result Value Ref Range    GFR If African American >60 >60 mL/min/1.73 m 2    GFR If Non African American >60 >60 mL/min/1.73 m 2   POCT glucose device results    Collection Time: 03/02/22  7:24 AM   Result Value Ref Range    POC Glucose, Blood 145 (H) 65 - 99 mg/dL   POCT glucose device results    Collection Time: 03/02/22 11:31 AM   Result Value Ref Range    POC Glucose, Blood 138 (H) 65 - 99 mg/dL       Current Facility-Administered Medications   Medication Frequency   • [START ON 3/3/2022] glipiZIDE (GLUCOTROL) tablet 2.5 mg BID AC   • metFORMIN (GLUCOPHAGE) tablet 500 mg BID WITH MEALS   • vitamin D3 (cholecalciferol) tablet 4,000 Units DAILY   • senna-docusate (PERICOLACE or SENOKOT S) 8.6-50 MG per tablet 2 Tablet BID W/MEALS PRN    And   • polyethylene glycol/lytes (MIRALAX) PACKET 1  Packet QDAY PRN    And   • magnesium hydroxide (MILK OF MAGNESIA) suspension 30 mL QDAY PRN    And   • bisacodyl (DULCOLAX) suppository 10 mg QDAY PRN   • insulin LISPRO (AdmeLOG,HumaLOG) injection 4X/DAY ACHS   • metoprolol SR (TOPROL XL) tablet 12.5 mg BID   • Respiratory Therapy Consult Continuous RT   • hydrALAZINE (APRESOLINE) tablet 25 mg Q8HRS PRN   • acetaminophen (Tylenol) tablet 650 mg Q4HRS PRN   • omeprazole (PRILOSEC) capsule 20 mg DAILY   • artificial tears ophthalmic solution 1 Drop PRN   • benzocaine-menthol (Cepacol) lozenge 1 Lozenge Q2HRS PRN   • mag hydrox-al hydrox-simeth (MAALOX PLUS ES or MYLANTA DS) suspension 20 mL Q2HRS PRN   • ondansetron (ZOFRAN ODT) dispertab 4 mg 4X/DAY PRN    Or   • ondansetron (ZOFRAN) syringe/vial injection 4 mg 4X/DAY PRN   • traZODone (DESYREL) tablet 50 mg QHS PRN   • sodium chloride (OCEAN) 0.65 % nasal spray 2 Spray PRN   • oxyCODONE immediate-release (ROXICODONE) tablet 5 mg Q3HRS PRN    Or   • oxyCODONE immediate release (ROXICODONE) tablet 10 mg Q3HRS PRN   • aspirin EC (ECOTRIN) tablet 81 mg DAILY   • atorvastatin (LIPITOR) tablet 80 mg DAILY   • clopidogrel (PLAVIX) tablet 75 mg DAILY   • enoxaparin (LOVENOX) inj 40 mg DAILY   • gabapentin (NEURONTIN) capsule 300 mg Q EVENING   • dextrose 50% (D50W) injection 50 mL Q15 MIN PRN   • promethazine (PHENERGAN) suppository 12.5-25 mg Q4HRS PRN   • promethazine (PHENERGAN) tablet 12.5-25 mg Q4HRS PRN       Orders Placed This Encounter   Procedures   • Diet Order Diet: Consistent CHO (Diabetic); Miscellaneous modifications: (optional): Vegetarian     Standing Status:   Standing     Number of Occurrences:   1     Order Specific Question:   Diet:     Answer:   Consistent CHO (Diabetic) [4]     Order Specific Question:   Miscellaneous modifications: (optional)     Answer:   Vegetarian [13]       Assessment:  Active Hospital Problems    Diagnosis    • *Right sided cerebral hemisphere cerebrovascular accident (CVA)  (HCC)    • Below-knee amputation (HCC)    • CAD (coronary artery disease)    • HTN (hypertension)    • Vitamin D deficiency    • Uncontrolled type 2 diabetes mellitus with hyperglycemia (HCC)        Medical Decision Making and Plan:  R CVA - Patient with acute posterior internal capsule infarct with weakness UE > LE complicated by previous L BKA. Patient is improving quickly  Addendum: IGC changed to left body (R Brain)   -PT and OT for mobility and ADLs  -SLP For cognitive screen     HTN/CAD - Patient on ASA and Plavix. Patient on Metoprolol 12.5 mg BID     DM2 with hyperglycemia - Patient on SSI and Metformin 500 mg BID on transfer.   -Hospitalist consulted. Metformin increased to 1000 mg BID. Into 250s, discussed with hospitlaist and Glipizide added 2.5 mg. Metformin reduced to 500 mg BID and Glipizide increased to BID      L BKA - Limited due to weakness and weight of prosthetic. Monitor for skin breakdown     HLD - Patient on Atorvastatin 80 mg QHS     Neuropathy - Patient on Gabapentin 300 mg QHS     Vitamin D deficiency - 12 on admission. Start on 4000 U, up from 2000 U    DVT Ppx - Patient on Lovenox on transfer.     Total time:  26 minutes.  I spent greater than 50% of the time for patient care, counseling, and coordination on this date, including unit/floor time, and face-to-face time with the patient as per interval events and assessment and plan above. Topics discussed included discharge planning, home alone during day, labile blood sugars, discussed with hospitalist and reduce metformin and increase glipizide.     Oliverio Bai M.D.

## 2022-03-02 NOTE — PROGRESS NOTES
Hospital Medicine Daily Progress Note      Chief Complaint:  Hypertension  Diabetes    Interval History:  Pt requesting that all his morning meds be given at 0730.  No other complaints.  Labs reviewed.     Review of Systems  Review of Systems   Constitutional: Negative for chills and fever.   HENT: Negative.    Eyes: Negative.    Respiratory: Negative for cough and shortness of breath.    Cardiovascular: Negative for chest pain and palpitations.   Gastrointestinal: Negative for abdominal pain, nausea and vomiting.   Genitourinary: Negative.    Musculoskeletal: Negative.    Skin: Negative for itching and rash.   Neurological: Positive for focal weakness.   Endo/Heme/Allergies: Negative for polydipsia. Does not bruise/bleed easily.        Physical Exam  Temp:  [36.6 °C (97.9 °F)-36.7 °C (98.1 °F)] 36.7 °C (98.1 °F)  Pulse:  [81-91] 89  Resp:  [18] 18  BP: (126-154)/(80-89) 138/81  SpO2:  [96 %-98 %] 98 %    Physical Exam  Vitals reviewed.   Constitutional:       General: He is not in acute distress.     Appearance: Normal appearance. He is not ill-appearing.   HENT:      Head: Normocephalic and atraumatic.      Right Ear: External ear normal.      Left Ear: External ear normal.      Nose: Nose normal.      Mouth/Throat:      Pharynx: Oropharynx is clear.   Eyes:      General:         Right eye: No discharge.         Left eye: No discharge.      Extraocular Movements: Extraocular movements intact.      Conjunctiva/sclera: Conjunctivae normal.   Cardiovascular:      Rate and Rhythm: Normal rate and regular rhythm.   Pulmonary:      Effort: Pulmonary effort is normal. No respiratory distress.      Breath sounds: Normal breath sounds. No wheezing.   Abdominal:      General: Bowel sounds are normal. There is no distension.      Palpations: Abdomen is soft.      Tenderness: There is no abdominal tenderness.   Musculoskeletal:      Cervical back: Normal range of motion and neck supple.      Right lower leg: No edema.       Comments: L BKA w/ prosthesis   Skin:     General: Skin is warm and dry.   Neurological:      Mental Status: He is alert and oriented to person, place, and time.      Comments: L sided weakness         Fluids    Intake/Output Summary (Last 24 hours) at 3/2/2022 1414  Last data filed at 3/2/2022 0900  Gross per 24 hour   Intake 480 ml   Output 1950 ml   Net -1470 ml       Laboratory  Recent Labs     03/02/22  0604   WBC 5.1   RBC 4.20*   HEMOGLOBIN 12.9*   HEMATOCRIT 38.4*   MCV 91.4   MCH 30.7   MCHC 33.6*   RDW 42.3   PLATELETCT 164   MPV 10.5     Recent Labs     03/02/22  0604   SODIUM 138   POTASSIUM 4.1   CHLORIDE 101   CO2 26   GLUCOSE 134*   BUN 18   CREATININE 0.74   CALCIUM 9.3             Assessment/Plan  * Right sided cerebral hemisphere cerebrovascular accident (CVA) (HCC)- (present on admission)  Assessment & Plan  On ASA, Plavix, and Lipitor    Thrombocytopenia (HCC)  Assessment & Plan  Follow Platelet counts on Plavix    Below-knee amputation (HCC)- (present on admission)  Assessment & Plan  H/O L BKA in 2019 2/2 PAD and DM  Ambulates w/ prosthesis    HTN (hypertension)- (present on admission)  Assessment & Plan  Observe blood pressure trends on Toprol    Vitamin D deficiency  Assessment & Plan  Vit D level 11  On supplementation    Uncontrolled type 2 diabetes mellitus with hyperglycemia (HCC)- (present on admission)  Assessment & Plan  HbA1c 8.2  Decrease Metformin to outpt dose  Increase Glipizide to outpt dose  Observe serum glucose trends  Outpt meds include Metformin 500 mg bid and Glipizide 2.5 mg bid    Full Code

## 2022-03-02 NOTE — CARE PLAN
Problem: Skin Integrity  Goal: Patient's skin integrity will be maintained or improve  Note: Patient's skin remains intact and free from new or accidental injury this shift.       Problem: Pain - Standard  Goal: Alleviation of pain or a reduction in pain to the patient’s comfort goal  Flowsheets (Taken 3/1/2022 2000)  Pain Rating Scale (NPRS): 0  Note: Patient able to verbalize pain level and verbalize an acceptable level of pain.    The patient is Stable - Low risk of patient condition declining or worsening

## 2022-03-02 NOTE — PROGRESS NOTES
Hospital Medicine Daily Progress Note      Chief Complaint:  Hypertension  Diabetes    Interval History:  No new problems.    Review of Systems  Review of Systems   Constitutional: Negative for chills and fever.   HENT: Negative.    Eyes: Negative.    Respiratory: Negative for cough and shortness of breath.    Cardiovascular: Negative for chest pain and palpitations.   Gastrointestinal: Negative for abdominal pain, nausea and vomiting.   Genitourinary: Negative.    Musculoskeletal: Negative.    Skin: Negative for itching and rash.   Neurological: Positive for focal weakness.   Endo/Heme/Allergies: Negative for polydipsia. Does not bruise/bleed easily.        Physical Exam  Temp:  [36.6 °C (97.9 °F)-36.7 °C (98.1 °F)] 36.7 °C (98.1 °F)  Pulse:  [81-91] 89  Resp:  [18] 18  BP: (126-154)/(80-89) 138/81  SpO2:  [96 %-98 %] 98 %    Physical Exam  Vitals reviewed.   Constitutional:       General: He is not in acute distress.     Appearance: Normal appearance. He is not ill-appearing.   HENT:      Head: Normocephalic and atraumatic.      Right Ear: External ear normal.      Left Ear: External ear normal.      Nose: Nose normal.      Mouth/Throat:      Pharynx: Oropharynx is clear.   Eyes:      General:         Right eye: No discharge.         Left eye: No discharge.      Extraocular Movements: Extraocular movements intact.      Conjunctiva/sclera: Conjunctivae normal.   Cardiovascular:      Rate and Rhythm: Normal rate and regular rhythm.   Pulmonary:      Effort: Pulmonary effort is normal. No respiratory distress.      Breath sounds: Normal breath sounds. No wheezing.   Abdominal:      General: Bowel sounds are normal. There is no distension.      Palpations: Abdomen is soft.      Tenderness: There is no abdominal tenderness.   Musculoskeletal:      Cervical back: Normal range of motion and neck supple.      Right lower leg: No edema.      Comments: L BKA w/ prosthesis   Skin:     General: Skin is warm and dry.    Neurological:      Mental Status: He is alert and oriented to person, place, and time.         Fluids    Intake/Output Summary (Last 24 hours) at 3/2/2022 1409  Last data filed at 3/2/2022 0900  Gross per 24 hour   Intake 480 ml   Output 1950 ml   Net -1470 ml       Laboratory  Recent Labs     03/02/22  0604   WBC 5.1   RBC 4.20*   HEMOGLOBIN 12.9*   HEMATOCRIT 38.4*   MCV 91.4   MCH 30.7   MCHC 33.6*   RDW 42.3   PLATELETCT 164   MPV 10.5     Recent Labs     03/02/22  0604   SODIUM 138   POTASSIUM 4.1   CHLORIDE 101   CO2 26   GLUCOSE 134*   BUN 18   CREATININE 0.74   CALCIUM 9.3             Assessment/Plan  * Right sided cerebral hemisphere cerebrovascular accident (CVA) (HCC)- (present on admission)  Assessment & Plan  On ASA, Plavix, and Lipitor    Thrombocytopenia ([facility])  Assessment & Plan  Follow Platelet counts on Plavix  Check F/U labs in AM     Below-knee amputation (HCC)- (present on admission)  Assessment & Plan  H/O L BKA in 2019 2/2 PAD and DM  Ambulates w/ prosthesis    HTN (hypertension)- (present on admission)  Assessment & Plan  Observe blood pressure trends on Toprol    Vitamin D deficiency  Assessment & Plan  Vit D level 11  On supplementation    Uncontrolled type 2 diabetes mellitus with hyperglycemia (HCC)- (present on admission)  Assessment & Plan  HbA1c 8.2  On Metformin and Glipizide  Observe serum glucose trends  Outpt meds include Metformin 500 mg bid and Glipizide 2.5 mg bid    Full Code

## 2022-03-02 NOTE — THERAPY
Physical Therapy   Daily Treatment     Patient Name: Israel Aviles  Age:  56 y.o., Sex:  male  Medical Record #: [id number]  Today's Date: 3/2/2022     Precautions  Precautions: Fall Risk  Comments: L hemiparesis, L BKA and prosthesis    Subjective    Patient agreeable to PT. Reports mild anxiety with curbs after yesterday.     Objective       03/02/22 1401   Vitals   O2 (LPM) 0   O2 Delivery Device None - Room Air   Stairs Functional Level of Assist   Level of Assist with Stairs Minimal Assist   # of Stairs Climbed   (2 sets of 4 stairs)   Stairs Description Assist device/equipment;Extra time;Hand rails;Limited by fatigue;Requires incidental assist;Safety concerns;Supervision for safety;Verbal cueing;Artifical limb(s)  (compensatory step-to pattern)   Standing Lower Body Exercises   Standing Lower Body Exercises Yes  (// bars, CGA, gait belt, setup, cueing, increased time, seated rest breaks after each set)   Hip Flexion 1 set of 10   Mini Squat 2 sets of 10;Full    Bed Mobility    Sit to Stand Minimal Assist   Interdisciplinary Plan of Care Collaboration   IDT Collaboration with  Physician   Patient Position at End of Therapy Seated;Chair Alarm On;Self Releasing Lap Belt Applied;Call Light within Reach;Tray Table within Reach;Phone within Reach   Collaboration Comments MD present for brief POC discussion with pt.   PT Total Time Spent   PT Individual Total Time Spent (Mins) 30   PT Charge Group   PT Gait Training 1   PT Therapeutic Exercise 1       Assessment    Patient has improving performance with stairs and ther ex today; minimal buckling at L BKA; increased performance time; good motivation; impaired endurance.    Strengths: Able to follow instructions,Alert and oriented,Effective communication skills,Good insight into deficits/needs,Independent prior level of function,Making steady progress towards goals,Manages pain appropriately,Motivated for self care and independence,Pleasant and  cooperative,Supportive family,Willingly participates in therapeutic activities  Barriers: Decreased endurance,Generalized weakness,Impaired activity tolerance,Impaired balance,Limited mobility    Plan    LLE strengthening and neuro re-ed with focus on hip flexors and glutes, gait with FWW (and Vector prn), standing balance, stairs, bed mobility with genie technique, cont curb training w/ FWW and use of Vector harness    Passport items to be completed:  Passport items to be completed:  Get in/out of bed safely, in/out of a vehicle, safely use mobility device, walk or wheel around home/community, navigate up and down stairs, show how to get up/down from the ground, ensure home is accessible, demonstrate HEP, complete caregiver training    Physical Therapy Problems (Active)       Problem: Mobility       Dates: Start: 02/25/22         Goal: STG-Within one week, patient will ambulate 150 feet with standard FWW, gait belt, L BKA prosthetic, and CGA.       Dates: Start: 02/25/22            Goal: STG-Within one week, patient will ascend and descend four to six stairs with B railings, gait belt, L BKA prosthetic, and Min A.       Dates: Start: 02/25/22               Problem: Mobility Transfers       Dates: Start: 02/25/22         Goal: STG-Within one week, patient will perform bed mobility with bed rail, cueing, and SBA.       Dates: Start: 02/25/22               Problem: PT-Long Term Goals       Dates: Start: 02/25/22         Goal: LTG-By discharge, patient will ambulate 300 feet with SPC and L BKA prosthetic at SPV level.       Dates: Start: 02/25/22            Goal: LTG-By discharge, patient will transfer one surface to another with SPC and L BKA prosthetic at IND level.       Dates: Start: 02/25/22            Goal: LTG-By discharge, patient will perform home exercise program with handout and L BKA prosthetic at IND level.       Dates: Start: 02/25/22            Goal: LTG-By discharge, patient will transfer in/out of a car  "with SPC and L BKA prosthetic at SPV level.       Dates: Start: 02/25/22            Goal: LTG-By discharge, patient will ascend/descend a 4\" curb with SPC and L BKA prosthetic at SPV level.       Dates: Start: 02/25/22              "

## 2022-03-02 NOTE — THERAPY
Speech Language Pathology  Daily Treatment     Patient Name: Israel Aviles  Age:  56 y.o., Sex:  male  Medical Record #: [id number]  Today's Date: 3/2/2022     Precautions  Precautions: Fall Risk  Comments: L hemiparesis, L BKA and prosthesis    Subjective    Patient was willing to participate in speech therapy.      Objective       03/02/22 1302   Cognition   Attention to Task Within Functional Limits (6-7)   Moderate Attention Within Functional Limits (6-7)   Functional Math / Financial Management Within Functional Limits (6-7)   SLP Total Time Spent   SLP Individual Total Time Spent (Mins) 30   Treatment Charges   SLP Cognitive Skill Development First 15 Minutes 1   SLP Cognitive Skill Development Additional 15 Minutes 1       Assessment    Completed cognitive task #4 independently with 100% accuracy. Discussed CLOF with possible d/c from ST with all goals met. Patient reports cognitive baseline.     Strengths: Willingly participates in therapeutic activities,Motivated for self care and independence,Pleasant and cooperative,Supportive family,Making steady progress towards goals  Barriers: Impaired functional cognition    Plan    D/c ST       Speech Therapy Problems (Active)       Problem: Problem Solving STGs       Dates: Start: 02/25/22         Goal: STG-Within one week, patient will complete executive function tasks with 80% accuracy given min verbal cues.         Dates: Start: 02/25/22               Problem: Speech/Swallowing LTGs       Dates: Start: 02/25/22         Goal: LTG-By discharge, patient will solve complex problem Zac for safe discharge home.       Dates: Start: 02/25/22

## 2022-03-03 LAB
GLUCOSE BLD STRIP.AUTO-MCNC: 136 MG/DL (ref 65–99)
GLUCOSE BLD STRIP.AUTO-MCNC: 143 MG/DL (ref 65–99)
GLUCOSE BLD STRIP.AUTO-MCNC: 213 MG/DL (ref 65–99)
GLUCOSE BLD STRIP.AUTO-MCNC: 76 MG/DL (ref 65–99)

## 2022-03-03 PROCEDURE — 700102 HCHG RX REV CODE 250 W/ 637 OVERRIDE(OP): Performed by: HOSPITALIST

## 2022-03-03 PROCEDURE — 700111 HCHG RX REV CODE 636 W/ 250 OVERRIDE (IP): Performed by: PHYSICAL MEDICINE & REHABILITATION

## 2022-03-03 PROCEDURE — 97530 THERAPEUTIC ACTIVITIES: CPT

## 2022-03-03 PROCEDURE — 97112 NEUROMUSCULAR REEDUCATION: CPT

## 2022-03-03 PROCEDURE — 97110 THERAPEUTIC EXERCISES: CPT | Mod: CQ

## 2022-03-03 PROCEDURE — 97116 GAIT TRAINING THERAPY: CPT | Mod: CQ

## 2022-03-03 PROCEDURE — 97535 SELF CARE MNGMENT TRAINING: CPT

## 2022-03-03 PROCEDURE — 99232 SBSQ HOSP IP/OBS MODERATE 35: CPT | Performed by: HOSPITALIST

## 2022-03-03 PROCEDURE — 770010 HCHG ROOM/CARE - REHAB SEMI PRIVAT*

## 2022-03-03 PROCEDURE — A9270 NON-COVERED ITEM OR SERVICE: HCPCS | Performed by: PHYSICAL MEDICINE & REHABILITATION

## 2022-03-03 PROCEDURE — A9270 NON-COVERED ITEM OR SERVICE: HCPCS | Performed by: HOSPITALIST

## 2022-03-03 PROCEDURE — 97110 THERAPEUTIC EXERCISES: CPT

## 2022-03-03 PROCEDURE — 82962 GLUCOSE BLOOD TEST: CPT | Mod: 91

## 2022-03-03 PROCEDURE — 700102 HCHG RX REV CODE 250 W/ 637 OVERRIDE(OP): Performed by: PHYSICAL MEDICINE & REHABILITATION

## 2022-03-03 PROCEDURE — 99232 SBSQ HOSP IP/OBS MODERATE 35: CPT | Performed by: PHYSICAL MEDICINE & REHABILITATION

## 2022-03-03 RX ADMIN — GLIPIZIDE 2.5 MG: 5 TABLET ORAL at 17:35

## 2022-03-03 RX ADMIN — ATORVASTATIN CALCIUM 80 MG: 40 TABLET, FILM COATED ORAL at 08:07

## 2022-03-03 RX ADMIN — METOPROLOL SUCCINATE 12.5 MG: 25 TABLET, EXTENDED RELEASE ORAL at 08:07

## 2022-03-03 RX ADMIN — CLOPIDOGREL BISULFATE 75 MG: 75 TABLET ORAL at 08:07

## 2022-03-03 RX ADMIN — METFORMIN HYDROCHLORIDE 500 MG: 500 TABLET ORAL at 17:33

## 2022-03-03 RX ADMIN — INSULIN LISPRO 4 UNITS: 100 INJECTION, SOLUTION INTRAVENOUS; SUBCUTANEOUS at 17:23

## 2022-03-03 RX ADMIN — Medication 4000 UNITS: at 08:08

## 2022-03-03 RX ADMIN — METFORMIN HYDROCHLORIDE 500 MG: 500 TABLET ORAL at 08:07

## 2022-03-03 RX ADMIN — ENOXAPARIN SODIUM 40 MG: 40 INJECTION SUBCUTANEOUS at 08:09

## 2022-03-03 RX ADMIN — ASPIRIN 81 MG: 81 TABLET, COATED ORAL at 08:07

## 2022-03-03 RX ADMIN — GLIPIZIDE 2.5 MG: 5 TABLET ORAL at 08:08

## 2022-03-03 RX ADMIN — GABAPENTIN 300 MG: 300 CAPSULE ORAL at 20:19

## 2022-03-03 RX ADMIN — METOPROLOL SUCCINATE 12.5 MG: 25 TABLET, EXTENDED RELEASE ORAL at 20:19

## 2022-03-03 ASSESSMENT — ENCOUNTER SYMPTOMS
SHORTNESS OF BREATH: 0
NAUSEA: 0
MUSCULOSKELETAL NEGATIVE: 1
COUGH: 0
PALPITATIONS: 0
VOMITING: 0
CHILLS: 0
POLYDIPSIA: 0
ABDOMINAL PAIN: 0
FEVER: 0
BRUISES/BLEEDS EASILY: 0
EYES NEGATIVE: 1
FOCAL WEAKNESS: 1

## 2022-03-03 ASSESSMENT — ACTIVITIES OF DAILY LIVING (ADL)
BED_CHAIR_WHEELCHAIR_TRANSFER_DESCRIPTION: ADAPTIVE EQUIPMENT;INCREASED TIME;SET-UP OF EQUIPMENT;SUPERVISION FOR SAFETY;VERBAL CUEING
BED_CHAIR_WHEELCHAIR_TRANSFER_DESCRIPTION: ADAPTIVE EQUIPMENT;INCREASED TIME;SUPERVISION FOR SAFETY;VERBAL CUEING
BED_CHAIR_WHEELCHAIR_TRANSFER_DESCRIPTION: ADAPTIVE EQUIPMENT;INCREASED TIME;SUPERVISION FOR SAFETY;VERBAL CUEING
TOILET_TRANSFER_DESCRIPTION: GRAB BAR;INCREASED TIME;SET-UP OF EQUIPMENT;SUPERVISION FOR SAFETY;VERBAL CUEING

## 2022-03-03 ASSESSMENT — GAIT ASSESSMENTS
DISTANCE (FEET): 160
ASSISTIVE DEVICE: FRONT WHEEL WALKER
GAIT LEVEL OF ASSIST: MINIMAL ASSIST
DISTANCE (FEET): 240
DEVIATION: BRADYKINETIC;DECREASED HEEL STRIKE;DECREASED TOE OFF
ASSISTIVE DEVICE: FRONT WHEEL WALKER
GAIT LEVEL OF ASSIST: CONTACT GUARD ASSIST
DEVIATION: BRADYKINETIC;DECREASED HEEL STRIKE;DECREASED TOE OFF

## 2022-03-03 NOTE — PROGRESS NOTES
Hospital Medicine Daily Progress Note      Chief Complaint:  Hypertension  Diabetes    Interval History:  Pt reports some lightheadedness upon getting out of bed in the morning.  Pt counseled to rise out of bed more gradually to minimize orthostatic changes.    Review of Systems  Review of Systems   Constitutional: Negative for chills and fever.   HENT: Negative.    Eyes: Negative.    Respiratory: Negative for cough and shortness of breath.    Cardiovascular: Negative for chest pain and palpitations.   Gastrointestinal: Negative for abdominal pain, nausea and vomiting.   Genitourinary: Negative.    Musculoskeletal: Negative.    Skin: Negative for itching and rash.   Neurological: Positive for focal weakness.   Endo/Heme/Allergies: Negative for polydipsia. Does not bruise/bleed easily.        Physical Exam  Temp:  [36.3 °C (97.4 °F)-37.2 °C (99 °F)] 36.3 °C (97.4 °F)  Pulse:  [88-93] 93  Resp:  [16-18] 16  BP: (121-131)/(63-80) 121/71  SpO2:  [96 %-99 %] 98 %    Physical Exam  Vitals reviewed.   Constitutional:       General: He is not in acute distress.     Appearance: Normal appearance. He is not ill-appearing.   HENT:      Head: Normocephalic and atraumatic.      Right Ear: External ear normal.      Left Ear: External ear normal.      Nose: Nose normal.      Mouth/Throat:      Pharynx: Oropharynx is clear.   Eyes:      General:         Right eye: No discharge.         Left eye: No discharge.      Extraocular Movements: Extraocular movements intact.      Conjunctiva/sclera: Conjunctivae normal.   Cardiovascular:      Rate and Rhythm: Normal rate and regular rhythm.   Pulmonary:      Effort: Pulmonary effort is normal. No respiratory distress.      Breath sounds: Normal breath sounds. No wheezing.   Abdominal:      General: Bowel sounds are normal. There is no distension.      Palpations: Abdomen is soft.      Tenderness: There is no abdominal tenderness.   Musculoskeletal:      Cervical back: Normal range of motion  and neck supple.      Right lower leg: No edema.      Comments: L BKA w/ prosthesis   Skin:     General: Skin is warm and dry.   Neurological:      Mental Status: He is alert and oriented to person, place, and time.      Comments: L sided weakness         Fluids    Intake/Output Summary (Last 24 hours) at 3/3/2022 1452  Last data filed at 3/3/2022 1231  Gross per 24 hour   Intake 720 ml   Output 1500 ml   Net -780 ml       Laboratory  Recent Labs     03/02/22  0604   WBC 5.1   RBC 4.20*   HEMOGLOBIN 12.9*   HEMATOCRIT 38.4*   MCV 91.4   MCH 30.7   MCHC 33.6*   RDW 42.3   PLATELETCT 164   MPV 10.5     Recent Labs     03/02/22  0604   SODIUM 138   POTASSIUM 4.1   CHLORIDE 101   CO2 26   GLUCOSE 134*   BUN 18   CREATININE 0.74   CALCIUM 9.3             Assessment/Plan  * Right sided cerebral hemisphere cerebrovascular accident (CVA) (HCC)- (present on admission)  Assessment & Plan  On ASA, Plavix, and Lipitor    Thrombocytopenia (HCC)  Assessment & Plan  Follow Platelet counts on DAPT    Below-knee amputation (HCC)- (present on admission)  Assessment & Plan  H/O L BKA in 2019 2/2 PAD and DM  Ambulates w/ prosthesis    HTN (hypertension)- (present on admission)  Assessment & Plan  Observe blood pressure trends on Toprol    Vitamin D deficiency  Assessment & Plan  Vit D level 11  On supplementation    Uncontrolled type 2 diabetes mellitus with hyperglycemia (HCC)- (present on admission)  Assessment & Plan  HbA1c 8.2  Now on outpt regimen of Metformin and Glipizide  Observe serum glucose trends  Outpt meds include Metformin 500 mg bid and Glipizide 2.5 mg bid    Full Code

## 2022-03-03 NOTE — PROGRESS NOTES
Rehab Progress Note     Encounter Date: 3/3/2022    CC: Decreased mobility, left sided weakness    Interval Events (Subjective)  Patient sitting up in room. He reports his arm is getting stronger as well as his leg. He is now able to easily raise his left leg with his prosthetic donned. Denies NVD. Denies SOB. Improved mobility    IDT Team Meeting 3/1/2022  DC/Disposition:  3/9/22    Objective:  VITAL SIGNS: /80   Pulse 89   Temp 37.2 °C (99 °F) (Oral)   Resp 16   Ht 1.829 m (6')   Wt 73.4 kg (161 lb 13.1 oz)   SpO2 96%   BMI 21.95 kg/m²   Gen: NAD  Psych: Mood and affect appropriate  CV: RRR, no edema  Resp: CTAB, no upper airway sounds  Abd: NTND  Neuro: AOx4, 4/5 L proximal UE  Unchanged from 3/2/22    Recent Results (from the past 72 hour(s))   POCT glucose device results    Collection Time: 02/28/22  4:19 PM   Result Value Ref Range    POC Glucose, Blood 131 (H) 65 - 99 mg/dL   POCT glucose device results    Collection Time: 02/28/22  8:22 PM   Result Value Ref Range    POC Glucose, Blood 196 (H) 65 - 99 mg/dL   POCT glucose device results    Collection Time: 03/01/22  7:21 AM   Result Value Ref Range    POC Glucose, Blood 131 (H) 65 - 99 mg/dL   POCT glucose device results    Collection Time: 03/01/22 11:35 AM   Result Value Ref Range    POC Glucose, Blood 112 (H) 65 - 99 mg/dL   POCT glucose device results    Collection Time: 03/01/22  4:50 PM   Result Value Ref Range    POC Glucose, Blood 114 (H) 65 - 99 mg/dL   POCT glucose device results    Collection Time: 03/01/22  8:52 PM   Result Value Ref Range    POC Glucose, Blood 235 (H) 65 - 99 mg/dL   CBC WITHOUT DIFFERENTIAL    Collection Time: 03/02/22  6:04 AM   Result Value Ref Range    WBC 5.1 4.8 - 10.8 K/uL    RBC 4.20 (L) 4.70 - 6.10 M/uL    Hemoglobin 12.9 (L) 14.0 - 18.0 g/dL    Hematocrit 38.4 (L) 42.0 - 52.0 %    MCV 91.4 81.4 - 97.8 fL    MCH 30.7 27.0 - 33.0 pg    MCHC 33.6 (L) 33.7 - 35.3 g/dL    RDW 42.3 35.9 - 50.0 fL    Platelet  Count 164 164 - 446 K/uL    MPV 10.5 9.0 - 12.9 fL   Basic Metabolic Panel    Collection Time: 03/02/22  6:04 AM   Result Value Ref Range    Sodium 138 135 - 145 mmol/L    Potassium 4.1 3.6 - 5.5 mmol/L    Chloride 101 96 - 112 mmol/L    Co2 26 20 - 33 mmol/L    Glucose 134 (H) 65 - 99 mg/dL    Bun 18 8 - 22 mg/dL    Creatinine 0.74 0.50 - 1.40 mg/dL    Calcium 9.3 8.5 - 10.5 mg/dL    Anion Gap 11.0 7.0 - 16.0   ESTIMATED GFR    Collection Time: 03/02/22  6:04 AM   Result Value Ref Range    GFR If African American >60 >60 mL/min/1.73 m 2    GFR If Non African American >60 >60 mL/min/1.73 m 2   POCT glucose device results    Collection Time: 03/02/22  7:24 AM   Result Value Ref Range    POC Glucose, Blood 145 (H) 65 - 99 mg/dL   POCT glucose device results    Collection Time: 03/02/22 11:31 AM   Result Value Ref Range    POC Glucose, Blood 138 (H) 65 - 99 mg/dL   POCT glucose device results    Collection Time: 03/02/22  5:22 PM   Result Value Ref Range    POC Glucose, Blood 154 (H) 65 - 99 mg/dL   POCT glucose device results    Collection Time: 03/02/22  8:27 PM   Result Value Ref Range    POC Glucose, Blood 182 (H) 65 - 99 mg/dL   POCT glucose device results    Collection Time: 03/03/22  7:22 AM   Result Value Ref Range    POC Glucose, Blood 143 (H) 65 - 99 mg/dL       Current Facility-Administered Medications   Medication Frequency   • glipiZIDE (GLUCOTROL) tablet 2.5 mg BID AC   • metFORMIN (GLUCOPHAGE) tablet 500 mg BID WITH MEALS   • vitamin D3 (cholecalciferol) tablet 4,000 Units DAILY   • senna-docusate (PERICOLACE or SENOKOT S) 8.6-50 MG per tablet 2 Tablet BID W/MEALS PRN    And   • polyethylene glycol/lytes (MIRALAX) PACKET 1 Packet QDAY PRN    And   • magnesium hydroxide (MILK OF MAGNESIA) suspension 30 mL QDAY PRN    And   • bisacodyl (DULCOLAX) suppository 10 mg QDAY PRN   • insulin LISPRO (AdmeLOG,HumaLOG) injection 4X/DAY ACHS   • metoprolol SR (TOPROL XL) tablet 12.5 mg BID   • Respiratory Therapy  Consult Continuous RT   • hydrALAZINE (APRESOLINE) tablet 25 mg Q8HRS PRN   • acetaminophen (Tylenol) tablet 650 mg Q4HRS PRN   • omeprazole (PRILOSEC) capsule 20 mg DAILY   • artificial tears ophthalmic solution 1 Drop PRN   • benzocaine-menthol (Cepacol) lozenge 1 Lozenge Q2HRS PRN   • mag hydrox-al hydrox-simeth (MAALOX PLUS ES or MYLANTA DS) suspension 20 mL Q2HRS PRN   • ondansetron (ZOFRAN ODT) dispertab 4 mg 4X/DAY PRN    Or   • ondansetron (ZOFRAN) syringe/vial injection 4 mg 4X/DAY PRN   • traZODone (DESYREL) tablet 50 mg QHS PRN   • sodium chloride (OCEAN) 0.65 % nasal spray 2 Spray PRN   • oxyCODONE immediate-release (ROXICODONE) tablet 5 mg Q3HRS PRN    Or   • oxyCODONE immediate release (ROXICODONE) tablet 10 mg Q3HRS PRN   • aspirin EC (ECOTRIN) tablet 81 mg DAILY   • atorvastatin (LIPITOR) tablet 80 mg DAILY   • clopidogrel (PLAVIX) tablet 75 mg DAILY   • enoxaparin (LOVENOX) inj 40 mg DAILY   • gabapentin (NEURONTIN) capsule 300 mg Q EVENING   • dextrose 50% (D50W) injection 50 mL Q15 MIN PRN   • promethazine (PHENERGAN) suppository 12.5-25 mg Q4HRS PRN   • promethazine (PHENERGAN) tablet 12.5-25 mg Q4HRS PRN       Orders Placed This Encounter   Procedures   • Diet Order Diet: Consistent CHO (Diabetic); Miscellaneous modifications: (optional): Vegetarian     Standing Status:   Standing     Number of Occurrences:   1     Order Specific Question:   Diet:     Answer:   Consistent CHO (Diabetic) [4]     Order Specific Question:   Miscellaneous modifications: (optional)     Answer:   Vegetarian [13]       Assessment:  Active Hospital Problems    Diagnosis    • *Right sided cerebral hemisphere cerebrovascular accident (CVA) (HCC)    • Below-knee amputation (HCC)    • CAD (coronary artery disease)    • HTN (hypertension)    • Vitamin D deficiency    • Uncontrolled type 2 diabetes mellitus with hyperglycemia (HCC)        Medical Decision Making and Plan:  R CVA - Patient with acute posterior internal capsule  infarct with weakness UE > LE complicated by previous L BKA. Patient is improving quickly  Addendum: IGC changed to left body (R Brain)   -PT and OT for mobility and ADLs  -SLP For cognitive screen     HTN/CAD - Patient on ASA and Plavix. Patient on Metoprolol 12.5 mg BID     DM2 with hyperglycemia - Patient on SSI and Metformin 500 mg BID on transfer.   -Hospitalist consulted. Metformin increased to 1000 mg BID. Into 250s, discussed with hospitlaist and Glipizide added 2.5 mg. Metformin reduced to 500 mg BID and Glipizide increased to BID      L BKA - Limited due to weakness and weight of prosthetic. Monitor for skin breakdown     HLD - Patient on Atorvastatin 80 mg QHS     Neuropathy - Patient on Gabapentin 300 mg QHS     Vitamin D deficiency - 12 on admission. Start on 4000 U, up from 2000 U    DVT Ppx - Patient on Lovenox on transfer. Ambulating > 125 feet, discontinue    Total time:  26 minutes.  I spent greater than 50% of the time for patient care, counseling, and coordination on this date, including unit/floor time, and face-to-face time with the patient as per interval events and assessment and plan above. Topics discussed included improved mobility, improved blood sugars, and discontinue Lovenox.     Oliverio Bai M.D.

## 2022-03-03 NOTE — THERAPY
Physical Therapy   Daily Treatment     Patient Name: Israel Aviles  Age:  56 y.o., Sex:  male  Medical Record #: [id number]  Today's Date: 3/3/2022     Precautions  Precautions: Fall Risk  Comments: L hemiparesis, L BKA and prosthesis    Subjective    Patient agreeable to PT. SO present initially.     Objective       03/03/22 1231   Vitals   O2 (LPM) 0   O2 Delivery Device None - Room Air   Gait Functional Level of Assist    Gait Level Of Assist Minimal Assist  (CGA-Min A)   Assistive Device Front Wheel Walker  (and gait belt and L BKA prosthetic)   Distance (Feet) 240   # of Times Distance was Traveled 2   Deviation Bradykinetic;Decreased Heel Strike;Decreased Toe Off  (focus on increased L step length and improving FWW management; also worked on L foot clearance during MidSwing; occasional A at L hand for  or L wrist position; pt reports increased R shoulder fatigue during second rep)   Wheelchair Functional Level of Assist   Wheelchair Assist   (n/a)   Distance Wheelchair (Feet or Distance) n/a   Wheelchair Description   (n/a; pt reports uses BLE propulsion as exercise)   Stairs Functional Level of Assist   Level of Assist with Stairs Contact Guard Assist   # of Stairs Climbed   (2 sets of 8 stairs)   Stairs Description Assist device/equipment;Extra time;Hand rails;Limited by fatigue;Requires incidental assist;Safety concerns;Supervision for safety;Verbal cueing;Artifical limb(s)   Transfer Functional Level of Assist   Bed, Chair, Wheelchair Transfer Contact Guard Assist   Bed Chair Wheelchair Transfer Description Adaptive equipment;Increased time;Supervision for safety;Verbal cueing  (at FWW level)   Standing Lower Body Exercises   Standing Lower Body Exercises Yes  (no RUE support once standing, Min A, gait belt, setup, in // bars, LLE BKA prosthesis as usual)   Hip Flexion 2 sets of 10   Bed Mobility    Sit to Supine Supervised   Sit to Stand Contact Guard Assist   Scooting Standby Assist   Rolling  Standby Assist   Interdisciplinary Plan of Care Collaboration   IDT Collaboration with  Family / Caregiver   Patient Position at End of Therapy In Bed;Call Light within Reach;Tray Table within Reach;Phone within Reach   Collaboration Comments SO present initially during first 30 min of session; observed ambulation; discussed CLOF, POC, and goals.   PT Total Time Spent   PT Individual Total Time Spent (Mins) 75   PT Charge Group   PT Gait Training 3   PT Therapeutic Exercise 1   PT Therapeutic Activities 1       Assessment    Patient has improving endurance and activity tolerance; improving use and motor control of LLE in closed chain activities, ataxia still present with open chain including descending stairs; anxiety with standing ther ex without RUE usage but good motivation and participation.    Strengths: Able to follow instructions,Alert and oriented,Effective communication skills,Good insight into deficits/needs,Independent prior level of function,Making steady progress towards goals,Manages pain appropriately,Motivated for self care and independence,Pleasant and cooperative,Supportive family,Willingly participates in therapeutic activities  Barriers: Decreased endurance,Generalized weakness,Impaired activity tolerance,Impaired balance,Limited mobility    Plan    LLE strengthening and neuro re-ed with focus on hip flexors and glutes, gait with FWW, standing balance, stairs, bed mobility, curb training w/ FWW and use of Vector.  D/C scheduled for 3/9/22.    Passport items to be completed:  Passport items to be completed:  Get in/out of bed safely, in/out of a vehicle, safely use mobility device, walk or wheel around home/community, navigate up and down stairs, show how to get up/down from the ground, ensure home is accessible, demonstrate HEP, complete caregiver training    Physical Therapy Problems (Active)       Problem: Mobility       Dates: Start: 02/25/22         Goal: STG-Within one week, patient will  "ambulate 150 feet with standard FWW, gait belt, L BKA prosthetic, and CGA.       Dates: Start: 02/25/22            Goal: STG-Within one week, patient will ascend and descend four to six stairs with B railings, gait belt, L BKA prosthetic, and Min A.       Dates: Start: 02/25/22               Problem: Mobility Transfers       Dates: Start: 02/25/22         Goal: STG-Within one week, patient will perform bed mobility with bed rail, cueing, and SBA.       Dates: Start: 02/25/22               Problem: PT-Long Term Goals       Dates: Start: 02/25/22         Goal: LTG-By discharge, patient will ambulate 300 feet with SPC and L BKA prosthetic at SPV level.       Dates: Start: 02/25/22            Goal: LTG-By discharge, patient will transfer one surface to another with SPC and L BKA prosthetic at IND level.       Dates: Start: 02/25/22            Goal: LTG-By discharge, patient will perform home exercise program with handout and L BKA prosthetic at IND level.       Dates: Start: 02/25/22            Goal: LTG-By discharge, patient will transfer in/out of a car with SPC and L BKA prosthetic at SPV level.       Dates: Start: 02/25/22            Goal: LTG-By discharge, patient will ascend/descend a 4\" curb with SPC and L BKA prosthetic at SPV level.       Dates: Start: 02/25/22              "

## 2022-03-03 NOTE — CARE PLAN
Problem: Diabetes Management  Goal: Patient's ability to maintain appropriate glucose levels will be maintained or improve  Note: Patient's noc blood glucose was 182, given 2 units of insulin lispro.      Problem: Pain - Standard  Goal: Alleviation of pain or a reduction in pain to the patient’s comfort goal  Flowsheets (Taken 3/2/2022 2000)  Pain Rating Scale (NPRS): 0  Note: Patient able to verbalize pain level and verbalize an acceptable level of pain.    The patient is Stable - Low risk of patient condition declining or worsening

## 2022-03-03 NOTE — THERAPY
"Occupational Therapy  Daily Treatment     Patient Name: Israel Aviles  Age:  56 y.o., Sex:  male  Medical Record #: [id number]  Today's Date: 3/3/2022     Precautions  Precautions: Fall Risk  Comments: L hemiparesis, L BKA and prosthesis         Subjective    \"This is fun.\" Pt reported about large peg board task     Objective       03/03/22 0931   Precautions   Precautions Fall Risk   Comments L hemiparesis, L BKA and prosthesis   Sitting Upper Body Exercises   Upper Extremity Bike Level 4 Resistance  (5 minutes, 0 Rb)   Fine Motor / Dexterity    Fine Motor / Dexterity Yes   Fine Motor / Dexterity Interventions Large peg board activity   Comments  Pt completed activity with increased time and verbal cues. Pt held pegs between digit 2 and 3 when placing pegs. Pt complete 50% activity standing and 50% sitting.   Interdisciplinary Plan of Care Collaboration   Patient Position at End of Therapy Seated;Phone within Reach;Tray Table within Reach;Call Light within Reach   OT Total Time Spent   OT Individual Total Time Spent (Mins) 30   OT Charge Group   OT Therapy Activity 1   OT Therapeutic Exercise  1       Assessment    Pt tolerated session well focused on LUE GM & FM, and UE strengthening. Pt cont to be very motivated to increase LUE function. Pt demonstrated improved quality of function while standing compared to sitting during Peg board activity.   Strengths: Supportive family,Willingly participates in therapeutic activities,Pleasant and cooperative,Motivated for self care and independence,Good endurance,Good balance,Effective communication skills,Able to follow instructions  Barriers: Decreased endurance,Hemiparesis,Limited mobility,Impaired balance    Plan    LUE neuro re-education, ADLs, balance, functional transfers/mobility   w/ tech 3X week      Passport items to be completed:  Perform bathroom transfers, complete dressing, complete feeding, get ready for the day, prepare a simple meal, participate " in household tasks, adapt home for safety needs, demonstrate home exercise program, complete caregiver training     Occupational Therapy Goals (Active)       Problem: Bathing       Dates: Start: 02/25/22         Goal: STG-Within one week, patient will bathe with supervision.       Dates: Start: 02/25/22         Goal Note filed on 03/01/22 1017 by Jagruti Granados, OT       Pt cont to require min A to wash RUE                 Problem: OT Long Term Goals       Dates: Start: 02/25/22         Goal: LTG-By discharge, patient will complete basic self care tasks with mod I to supervision.        Dates: Start: 02/25/22            Goal: LTG-By discharge, patient will perform bathroom transfers with mod I to supervision.        Dates: Start: 02/25/22

## 2022-03-03 NOTE — THERAPY
Physical Therapy   Daily Treatment     Patient Name: Israel Aviles  Age:  56 y.o., Sex:  male  Medical Record #: [id number]  Today's Date: 3/3/2022     Precautions  Precautions: Fall Risk  Comments: L hemiparesis, L BKA and prosthesis    Subjective    Pt in bed upon arrvial, agreeable to session.     Objective       03/03/22 1431   Pain 0 - 10 Group   Pain Rating Scale (NPRS) 0   Gait Functional Level of Assist    Gait Level Of Assist Contact Guard Assist   Assistive Device Front Wheel Walker   Distance (Feet) 160   # of Times Distance was Traveled 2   Deviation Bradykinetic;Decreased Heel Strike;Decreased Toe Off   Transfer Functional Level of Assist   Bed, Chair, Wheelchair Transfer Contact Guard Assist   Bed Chair Wheelchair Transfer Description Adaptive equipment;Increased time;Set-up of equipment;Supervision for safety;Verbal cueing  (SPT w/ FWW)   Toilet Transfers Contact Guard Assist   Toilet Transfer Description Grab bar;Increased time;Set-up of equipment;Supervision for safety;Verbal cueing   Standing Lower Body Exercises   Mini Squat 3 sets of 10;Full    Bed Mobility    Supine to Sit Supervised   Sit to Stand Contact Guard Assist   Scooting Standby Assist   Interdisciplinary Plan of Care Collaboration   IDT Collaboration with  Nursing;Physical Therapist   Patient Position at End of Therapy Seated  (in bathroom, inforce use of call string and RN notified)   Collaboration Comments POC w/ PT   PT Total Time Spent   PT Individual Total Time Spent (Mins) 30   PT Charge Group   PT Gait Training 1   PT Therapeutic Exercise 1   Supervising Physical Therapist Rodriguez Herman prosthetic at EOB w/ SPV    Assessment    Pt tolerated session well focusing on amb w/ FWW and mini squat in // bars. Pt is used to step-to pattern during amb, CGA during step-to. Required cues for increasing L foot clearance during amb and pt tripped 2 times during amb and required Hari to recover.     Strengths: Able to follow  instructions,Alert and oriented,Effective communication skills,Good insight into deficits/needs,Independent prior level of function,Making steady progress towards goals,Manages pain appropriately,Motivated for self care and independence,Pleasant and cooperative,Supportive family,Willingly participates in therapeutic activities  Barriers: Decreased endurance,Generalized weakness,Impaired activity tolerance,Impaired balance,Limited mobility    Plan    LLE strengthening and neuro re-ed with focus on hip flexors and glutes, gait with FWW, standing balance, stairs, bed mobility, curb training w/ FWW and use of Vector.  D/C scheduled for 3/9/22.     Passport items to be completed:  Get in/out of bed safely, in/out of a vehicle, safely use mobility device, walk or wheel around home/community, navigate up and down stairs, show how to get up/down from the ground, ensure home is accessible, demonstrate HEP, complete caregiver training    Physical Therapy Problems (Active)       Problem: Mobility       Dates: Start: 02/25/22         Goal: STG-Within one week, patient will ambulate 150 feet with standard FWW, gait belt, L BKA prosthetic, and CGA.       Dates: Start: 02/25/22            Goal: STG-Within one week, patient will ascend and descend four to six stairs with B railings, gait belt, L BKA prosthetic, and Min A.       Dates: Start: 02/25/22               Problem: Mobility Transfers       Dates: Start: 02/25/22         Goal: STG-Within one week, patient will perform bed mobility with bed rail, cueing, and SBA.       Dates: Start: 02/25/22               Problem: PT-Long Term Goals       Dates: Start: 02/25/22         Goal: LTG-By discharge, patient will ambulate 300 feet with SPC and L BKA prosthetic at SPV level.       Dates: Start: 02/25/22            Goal: LTG-By discharge, patient will transfer one surface to another with SPC and L BKA prosthetic at IND level.       Dates: Start: 02/25/22            Goal: LTG-By  "discharge, patient will perform home exercise program with handout and L BKA prosthetic at IND level.       Dates: Start: 02/25/22            Goal: LTG-By discharge, patient will transfer in/out of a car with SPC and L BKA prosthetic at SPV level.       Dates: Start: 02/25/22            Goal: LTG-By discharge, patient will ascend/descend a 4\" curb with SPC and L BKA prosthetic at SPV level.       Dates: Start: 02/25/22              "

## 2022-03-03 NOTE — THERAPY
Occupational Therapy  Daily Treatment     Patient Name: Israel Aviles  Age:  56 y.o., Sex:  male  Medical Record #: [id number]  Today's Date: 3/3/2022     Precautions  Precautions: (P) Fall Risk  Comments: (P) L hemiparesis, L BKA and prosthesis         Subjective    Pt reported feeling dizzy after supine>sit in bed.      Objective       03/03/22 0701   Precautions   Precautions Fall Risk   Comments L hemiparesis, L BKA and prosthesis   Strength Upper Body   Lt Shoulder Flexion Strength 5 (N)   Lt Shoulder Extension Strength 4 (G)   Lt Shoulder Abduction Strength 4 (G)   Lt Elbow Flexion Strength 5 (N)   Lt Elbow Extension Strength 5 (N)   Lt Forearm Supination Strength 4 (G)   Lt Forearm Pronation Strength 4 (G)   Lt Wrist Flexion Strength 5 (N)   Lt Wrist Extension Strength 5 (N)   Left  Impaired   Functional Level of Assist   Lower Body Dressing Supervision   Lower Body Dressing Description Set-up of equipment   Bed, Chair, Wheelchair Transfer Standby Assist   Bed Chair Wheelchair Transfer Description Adaptive equipment;Increased time;Supervision for safety;Verbal cueing   Neuro-Muscular Treatments   Neuro-Muscular Treatments Sequencing;Postural Changes;Postural Facilitation   Comments see note for details   Interdisciplinary Plan of Care Collaboration   IDT Collaboration with  Nursing   Patient Position at End of Therapy Seated;Family / Friend in Room;Tray Table within Reach;Phone within Reach   Collaboration Comments pt reported dizziness   OT Total Time Spent   OT Individual Total Time Spent (Mins) 60   OT Charge Group   OT Self Care / ADL 1   OT Neuromuscular Re-education / Balance 3     Pt completed AAROM of LUE w/ washcloth while seated at table. Pt completed shoulder push/pull, arch, and [employer] [place] exercises to increase LUE function.  Pt stacked and unstacked small and medium size cones w/ LUE. Pt required increased time due to impaired LUE gross motor while reaching. Pt did not require any assist to  complete activity.     Pt attempted to complete small peg task with LUE. Pt required assistance to place small peg in LUE pinch and increased time to place into board. Pt was able to place 3 pegs into peg board in 6 minutes.       Assessment    Pt tolerated session well focused on LUE function and neuro re-education. Pt cont to be very motivated to increase LUE function before d/c. Pt also demonstrated increased independence in LB dressing. Pt reported dizziness upon sitting up, but recovered after 3 glasses of water.   Strengths: Supportive family,Willingly participates in therapeutic activities,Pleasant and cooperative,Motivated for self care and independence,Good endurance,Good balance,Effective communication skills,Able to follow instructions  Barriers: Decreased endurance,Hemiparesis,Limited mobility,Impaired balance    Plan    LUE neuro re-education (GM and FM coordination) , ADLs, balance, functional transfers/mobility   w/ tech 3X week      Passport items to be completed:  Perform bathroom transfers, complete dressing, complete feeding, get ready for the day, prepare a simple meal, participate in household tasks, adapt home for safety needs, demonstrate home exercise program, complete caregiver training     Occupational Therapy Goals (Active)       Problem: Bathing       Dates: Start: 02/25/22         Goal: STG-Within one week, patient will bathe with supervision.       Dates: Start: 02/25/22         Goal Note filed on 03/01/22 1017 by Jagruti Granados OT       Pt cont to require min A to wash RUE                 Problem: OT Long Term Goals       Dates: Start: 02/25/22         Goal: LTG-By discharge, patient will complete basic self care tasks with mod I to supervision.        Dates: Start: 02/25/22            Goal: LTG-By discharge, patient will perform bathroom transfers with mod I to supervision.        Dates: Start: 02/25/22

## 2022-03-04 LAB
GLUCOSE BLD STRIP.AUTO-MCNC: 120 MG/DL (ref 65–99)
GLUCOSE BLD STRIP.AUTO-MCNC: 150 MG/DL (ref 65–99)

## 2022-03-04 PROCEDURE — A9270 NON-COVERED ITEM OR SERVICE: HCPCS | Performed by: PHYSICAL MEDICINE & REHABILITATION

## 2022-03-04 PROCEDURE — 99232 SBSQ HOSP IP/OBS MODERATE 35: CPT | Performed by: PHYSICAL MEDICINE & REHABILITATION

## 2022-03-04 PROCEDURE — 97530 THERAPEUTIC ACTIVITIES: CPT

## 2022-03-04 PROCEDURE — 97535 SELF CARE MNGMENT TRAINING: CPT

## 2022-03-04 PROCEDURE — 97110 THERAPEUTIC EXERCISES: CPT

## 2022-03-04 PROCEDURE — 700102 HCHG RX REV CODE 250 W/ 637 OVERRIDE(OP): Performed by: HOSPITALIST

## 2022-03-04 PROCEDURE — 99232 SBSQ HOSP IP/OBS MODERATE 35: CPT | Performed by: HOSPITALIST

## 2022-03-04 PROCEDURE — A9270 NON-COVERED ITEM OR SERVICE: HCPCS | Performed by: HOSPITALIST

## 2022-03-04 PROCEDURE — 700102 HCHG RX REV CODE 250 W/ 637 OVERRIDE(OP): Performed by: PHYSICAL MEDICINE & REHABILITATION

## 2022-03-04 PROCEDURE — 97112 NEUROMUSCULAR REEDUCATION: CPT

## 2022-03-04 PROCEDURE — 770010 HCHG ROOM/CARE - REHAB SEMI PRIVAT*

## 2022-03-04 PROCEDURE — 82962 GLUCOSE BLOOD TEST: CPT | Mod: 91

## 2022-03-04 RX ADMIN — GLIPIZIDE 2.5 MG: 5 TABLET ORAL at 17:01

## 2022-03-04 RX ADMIN — METOPROLOL SUCCINATE 12.5 MG: 25 TABLET, EXTENDED RELEASE ORAL at 20:35

## 2022-03-04 RX ADMIN — CLOPIDOGREL BISULFATE 75 MG: 75 TABLET ORAL at 08:21

## 2022-03-04 RX ADMIN — METOPROLOL SUCCINATE 12.5 MG: 25 TABLET, EXTENDED RELEASE ORAL at 08:21

## 2022-03-04 RX ADMIN — METFORMIN HYDROCHLORIDE 500 MG: 500 TABLET ORAL at 08:21

## 2022-03-04 RX ADMIN — Medication 4000 UNITS: at 08:22

## 2022-03-04 RX ADMIN — METFORMIN HYDROCHLORIDE 500 MG: 500 TABLET ORAL at 17:00

## 2022-03-04 RX ADMIN — ATORVASTATIN CALCIUM 80 MG: 40 TABLET, FILM COATED ORAL at 08:21

## 2022-03-04 RX ADMIN — GLIPIZIDE 2.5 MG: 5 TABLET ORAL at 08:21

## 2022-03-04 RX ADMIN — ASPIRIN 81 MG: 81 TABLET, COATED ORAL at 08:22

## 2022-03-04 RX ADMIN — GABAPENTIN 300 MG: 300 CAPSULE ORAL at 20:35

## 2022-03-04 ASSESSMENT — ENCOUNTER SYMPTOMS
PALPITATIONS: 0
SHORTNESS OF BREATH: 0
ABDOMINAL PAIN: 0
MUSCULOSKELETAL NEGATIVE: 1
COUGH: 0
VOMITING: 0
CHILLS: 0
FOCAL WEAKNESS: 1
POLYDIPSIA: 0
EYES NEGATIVE: 1
NAUSEA: 0
FEVER: 0
BRUISES/BLEEDS EASILY: 0

## 2022-03-04 ASSESSMENT — ACTIVITIES OF DAILY LIVING (ADL)
TOILET_TRANSFER_DESCRIPTION: GRAB BAR;SUPERVISION FOR SAFETY
BED_CHAIR_WHEELCHAIR_TRANSFER_DESCRIPTION: INCREASED TIME;SET-UP OF EQUIPMENT;SUPERVISION FOR SAFETY;VERBAL CUEING
TUB_SHOWER_TRANSFER_DESCRIPTION: GRAB BAR;SHOWER BENCH;SUPERVISION FOR SAFETY
BED_CHAIR_WHEELCHAIR_TRANSFER_DESCRIPTION: INCREASED TIME;SET-UP OF EQUIPMENT;SUPERVISION FOR SAFETY;VERBAL CUEING
TOILETING_LEVEL_OF_ASSIST_DESCRIPTION: INCREASED TIME;GRAB BAR

## 2022-03-04 ASSESSMENT — PAIN DESCRIPTION - PAIN TYPE: TYPE: ACUTE PAIN

## 2022-03-04 NOTE — CARE PLAN
Problem: Diabetes Management  Goal: Patient's ability to maintain appropriate glucose levels will be maintained or improve  Note: Patients noc blood glucose was 136.      Problem: Pain - Standard  Goal: Alleviation of pain or a reduction in pain to the patient’s comfort goal  Flowsheets (Taken 3/3/2022 2000)  Pain Rating Scale (NPRS): 0  Note: Patient does not report pain this shift.    The patient is Stable - Low risk of patient condition declining or worsening

## 2022-03-04 NOTE — PROGRESS NOTES
Hospital Medicine Daily Progress Note      Chief Complaint:  Hypertension  Diabetes    Interval History:  Pt reports lightheadedness improved now that he's allowing himself more time to arise from a supine position.    Review of Systems  Review of Systems   Constitutional: Negative for chills and fever.   HENT: Negative.    Eyes: Negative.    Respiratory: Negative for cough and shortness of breath.    Cardiovascular: Negative for chest pain and palpitations.   Gastrointestinal: Negative for abdominal pain, nausea and vomiting.   Genitourinary: Negative.    Musculoskeletal: Negative.    Skin: Negative for itching and rash.   Neurological: Positive for focal weakness.   Endo/Heme/Allergies: Negative for polydipsia. Does not bruise/bleed easily.        Physical Exam  Temp:  [36.6 °C (97.8 °F)-36.7 °C (98.1 °F)] 36.6 °C (97.8 °F)  Pulse:  [77-98] 78  Resp:  [16] 16  BP: (135-143)/(78-81) 139/78  SpO2:  [95 %-97 %] 96 %    Physical Exam  Vitals reviewed.   Constitutional:       General: He is not in acute distress.     Appearance: Normal appearance. He is not ill-appearing.   HENT:      Head: Normocephalic and atraumatic.      Right Ear: External ear normal.      Left Ear: External ear normal.      Nose: Nose normal.      Mouth/Throat:      Pharynx: Oropharynx is clear.   Eyes:      General:         Right eye: No discharge.         Left eye: No discharge.      Extraocular Movements: Extraocular movements intact.      Conjunctiva/sclera: Conjunctivae normal.   Cardiovascular:      Rate and Rhythm: Normal rate and regular rhythm.   Pulmonary:      Effort: Pulmonary effort is normal. No respiratory distress.      Breath sounds: Normal breath sounds. No wheezing.   Abdominal:      General: Bowel sounds are normal. There is no distension.      Palpations: Abdomen is soft.      Tenderness: There is no abdominal tenderness.   Musculoskeletal:      Cervical back: Normal range of motion and neck supple.      Right lower leg: No  edema.      Comments: L BKA   Skin:     General: Skin is warm and dry.   Neurological:      Mental Status: He is alert and oriented to person, place, and time.      Comments: L sided weakness         Fluids    Intake/Output Summary (Last 24 hours) at 3/4/2022 1523  Last data filed at 3/4/2022 1200  Gross per 24 hour   Intake 660 ml   Output 2475 ml   Net -1815 ml       Laboratory  Recent Labs     03/02/22  0604   WBC 5.1   RBC 4.20*   HEMOGLOBIN 12.9*   HEMATOCRIT 38.4*   MCV 91.4   MCH 30.7   MCHC 33.6*   RDW 42.3   PLATELETCT 164   MPV 10.5     Recent Labs     03/02/22  0604   SODIUM 138   POTASSIUM 4.1   CHLORIDE 101   CO2 26   GLUCOSE 134*   BUN 18   CREATININE 0.74   CALCIUM 9.3             Assessment/Plan  * Right sided cerebral hemisphere cerebrovascular accident (CVA) (HCC)- (present on admission)  Assessment & Plan  On ASA, Plavix, and Lipitor    Thrombocytopenia (HCC)  Assessment & Plan  Follow Platelet counts on DAPT    Below-knee amputation (HCC)- (present on admission)  Assessment & Plan  H/O L BKA in 2019 2/2 PAD and DM  Ambulates w/ prosthesis    HTN (hypertension)- (present on admission)  Assessment & Plan  Blood pressure controlled on Toprol    Vitamin D deficiency  Assessment & Plan  Vit D level 11  On supplementation    Uncontrolled type 2 diabetes mellitus with hyperglycemia (HCC)- (present on admission)  Assessment & Plan  HbA1c 8.2  Continue Metformin and Glipizide per outpt regimen  Discontinue FSBS and SSI as not routinely needing insulin coverage  Outpt meds include Metformin 500 mg bid and Glipizide 2.5 mg bid    Full Code    Pt w/o acute medical issues requiring Hospitalist services at this time.  Will sign off.  Please re-consult as needed.  Discussed w/ Dr. Bai.

## 2022-03-04 NOTE — THERAPY
Occupational Therapy  Daily Treatment     Patient Name: Irsael Aviles  Age:  56 y.o., Sex:  male  Medical Record #: [id number]  Today's Date: 3/4/2022     Precautions  Precautions: (P) Fall Risk  Comments: (P) L hemiparesis, L BKA and prosthesis         Subjective  Pt requested to complete shower during session     Objective       03/04/22 0831   Precautions   Precautions Fall Risk   Comments L hemiparesis, L BKA and prosthesis   Functional Level of Assist   Grooming Modified Independent;Seated   Grooming Description Seated in wheelchair at sink   Bathing Supervision   Bathing Description Grab bar;Tub bench;Increased time;Supervision for safety   Upper Body Dressing Modified Independent   Upper Body Dressing Description Increased time   Lower Body Dressing Supervision   Lower Body Dressing Description Supervision for safety   Toileting Modified Independent   Toileting Description Increased time;Grab bar   Bed, Chair, Wheelchair Transfer Standby Assist   Bed Chair Wheelchair Transfer Description Increased time;Set-up of equipment;Supervision for safety;Verbal cueing  (lateral scoot w/out prosthetic)   Toilet Transfers Supervised   Toilet Transfer Description Grab bar;Supervision for safety   Tub / Shower Transfers Supervised   Tub Shower Transfer Description Grab bar;Shower bench;Supervision for safety   Interdisciplinary Plan of Care Collaboration   Patient Position at End of Therapy Seated;Phone within Reach;Tray Table within Reach;Call Light within Reach   OT Total Time Spent   OT Individual Total Time Spent (Mins) 30   OT Charge Group   OT Self Care / ADL 2       Assessment    Pt tolerated session well focused on ADLs. Pt demonstrated increased independence with dressing, bathing, and toileting. Pt cont to incorporate LUE during ADLs.   Strengths: Supportive family,Willingly participates in therapeutic activities,Pleasant and cooperative,Motivated for self care and independence,Good endurance,Good  balance,Effective communication skills,Able to follow instructions  Barriers: Decreased endurance,Hemiparesis,Limited mobility,Impaired balance    Plan    LUE neuro re-education, ADLs, balance, functional transfers/mobility   w/ tech 3X week      Passport items to be completed:  Perform bathroom transfers, complete dressing, complete feeding, get ready for the day, prepare a simple meal, participate in household tasks, adapt home for safety needs, demonstrate home exercise program, complete caregiver training        Occupational Therapy Goals (Active)       Problem: Bathing       Dates: Start: 02/25/22         Goal: STG-Within one week, patient will bathe with supervision.       Dates: Start: 02/25/22         Goal Note filed on 03/01/22 1017 by Jagruti Granados, OT       Pt cont to require min A to wash RUE                 Problem: OT Long Term Goals       Dates: Start: 02/25/22         Goal: LTG-By discharge, patient will complete basic self care tasks with mod I to supervision.        Dates: Start: 02/25/22            Goal: LTG-By discharge, patient will perform bathroom transfers with mod I to supervision.        Dates: Start: 02/25/22

## 2022-03-04 NOTE — PROGRESS NOTES
Rehab Progress Note     Encounter Date: 3/4/2022    CC: Decreased mobility, left sided weakness    Interval Events (Subjective)  Patient sitting up in room. He reports he feels a little weaker today but probably overuse. He reports he would like to see if insurance would cover an E stim device. Discussed would ask therapy/CM but may have to buy online. Blood sugars remain well controlled. Denies NVD, would like to stop bowel medications.     IDT Team Meeting 3/1/2022  DC/Disposition:  3/9/22    Objective:  VITAL SIGNS: /81   Pulse 77   Temp 36.6 °C (97.9 °F) (Oral)   Resp 16   Ht 1.829 m (6')   Wt 73.4 kg (161 lb 13.1 oz)   SpO2 95%   BMI 21.95 kg/m²   Gen: NAD  Psych: Mood and affect appropriate  CV: RRR, no edema  Resp: CTAB, no upper airway sounds  Abd: NTND  Neuro: AOx4, 4-/5 LUE    Recent Results (from the past 72 hour(s))   POCT glucose device results    Collection Time: 03/01/22  4:50 PM   Result Value Ref Range    POC Glucose, Blood 114 (H) 65 - 99 mg/dL   POCT glucose device results    Collection Time: 03/01/22  8:52 PM   Result Value Ref Range    POC Glucose, Blood 235 (H) 65 - 99 mg/dL   CBC WITHOUT DIFFERENTIAL    Collection Time: 03/02/22  6:04 AM   Result Value Ref Range    WBC 5.1 4.8 - 10.8 K/uL    RBC 4.20 (L) 4.70 - 6.10 M/uL    Hemoglobin 12.9 (L) 14.0 - 18.0 g/dL    Hematocrit 38.4 (L) 42.0 - 52.0 %    MCV 91.4 81.4 - 97.8 fL    MCH 30.7 27.0 - 33.0 pg    MCHC 33.6 (L) 33.7 - 35.3 g/dL    RDW 42.3 35.9 - 50.0 fL    Platelet Count 164 164 - 446 K/uL    MPV 10.5 9.0 - 12.9 fL   Basic Metabolic Panel    Collection Time: 03/02/22  6:04 AM   Result Value Ref Range    Sodium 138 135 - 145 mmol/L    Potassium 4.1 3.6 - 5.5 mmol/L    Chloride 101 96 - 112 mmol/L    Co2 26 20 - 33 mmol/L    Glucose 134 (H) 65 - 99 mg/dL    Bun 18 8 - 22 mg/dL    Creatinine 0.74 0.50 - 1.40 mg/dL    Calcium 9.3 8.5 - 10.5 mg/dL    Anion Gap 11.0 7.0 - 16.0   ESTIMATED GFR    Collection Time: 03/02/22  6:04 AM    Result Value Ref Range    GFR If African American >60 >60 mL/min/1.73 m 2    GFR If Non African American >60 >60 mL/min/1.73 m 2   POCT glucose device results    Collection Time: 03/02/22  7:24 AM   Result Value Ref Range    POC Glucose, Blood 145 (H) 65 - 99 mg/dL   POCT glucose device results    Collection Time: 03/02/22 11:31 AM   Result Value Ref Range    POC Glucose, Blood 138 (H) 65 - 99 mg/dL   POCT glucose device results    Collection Time: 03/02/22  5:22 PM   Result Value Ref Range    POC Glucose, Blood 154 (H) 65 - 99 mg/dL   POCT glucose device results    Collection Time: 03/02/22  8:27 PM   Result Value Ref Range    POC Glucose, Blood 182 (H) 65 - 99 mg/dL   POCT glucose device results    Collection Time: 03/03/22  7:22 AM   Result Value Ref Range    POC Glucose, Blood 143 (H) 65 - 99 mg/dL   POCT glucose device results    Collection Time: 03/03/22 12:14 PM   Result Value Ref Range    POC Glucose, Blood 76 65 - 99 mg/dL   POCT glucose device results    Collection Time: 03/03/22  5:21 PM   Result Value Ref Range    POC Glucose, Blood 213 (H) 65 - 99 mg/dL   POCT glucose device results    Collection Time: 03/03/22  8:21 PM   Result Value Ref Range    POC Glucose, Blood 136 (H) 65 - 99 mg/dL   POCT glucose device results    Collection Time: 03/04/22  7:31 AM   Result Value Ref Range    POC Glucose, Blood 150 (H) 65 - 99 mg/dL   POCT glucose device results    Collection Time: 03/04/22 11:05 AM   Result Value Ref Range    POC Glucose, Blood 120 (H) 65 - 99 mg/dL       Current Facility-Administered Medications   Medication Frequency   • glipiZIDE (GLUCOTROL) tablet 2.5 mg BID AC   • metFORMIN (GLUCOPHAGE) tablet 500 mg BID WITH MEALS   • vitamin D3 (cholecalciferol) tablet 4,000 Units DAILY   • senna-docusate (PERICOLACE or SENOKOT S) 8.6-50 MG per tablet 2 Tablet BID W/MEALS PRN    And   • polyethylene glycol/lytes (MIRALAX) PACKET 1 Packet QDAY PRN    And   • magnesium hydroxide (MILK OF MAGNESIA)  suspension 30 mL QDAY PRN    And   • bisacodyl (DULCOLAX) suppository 10 mg QDAY PRN   • insulin LISPRO (AdmeLOG,HumaLOG) injection 4X/DAY ACHS   • metoprolol SR (TOPROL XL) tablet 12.5 mg BID   • Respiratory Therapy Consult Continuous RT   • hydrALAZINE (APRESOLINE) tablet 25 mg Q8HRS PRN   • acetaminophen (Tylenol) tablet 650 mg Q4HRS PRN   • omeprazole (PRILOSEC) capsule 20 mg DAILY   • artificial tears ophthalmic solution 1 Drop PRN   • benzocaine-menthol (Cepacol) lozenge 1 Lozenge Q2HRS PRN   • mag hydrox-al hydrox-simeth (MAALOX PLUS ES or MYLANTA DS) suspension 20 mL Q2HRS PRN   • ondansetron (ZOFRAN ODT) dispertab 4 mg 4X/DAY PRN    Or   • ondansetron (ZOFRAN) syringe/vial injection 4 mg 4X/DAY PRN   • traZODone (DESYREL) tablet 50 mg QHS PRN   • sodium chloride (OCEAN) 0.65 % nasal spray 2 Spray PRN   • oxyCODONE immediate-release (ROXICODONE) tablet 5 mg Q3HRS PRN    Or   • oxyCODONE immediate release (ROXICODONE) tablet 10 mg Q3HRS PRN   • aspirin EC (ECOTRIN) tablet 81 mg DAILY   • atorvastatin (LIPITOR) tablet 80 mg DAILY   • clopidogrel (PLAVIX) tablet 75 mg DAILY   • gabapentin (NEURONTIN) capsule 300 mg Q EVENING   • dextrose 50% (D50W) injection 50 mL Q15 MIN PRN   • promethazine (PHENERGAN) suppository 12.5-25 mg Q4HRS PRN   • promethazine (PHENERGAN) tablet 12.5-25 mg Q4HRS PRN       Orders Placed This Encounter   Procedures   • Diet Order Diet: Consistent CHO (Diabetic); Miscellaneous modifications: (optional): Vegetarian     Standing Status:   Standing     Number of Occurrences:   1     Order Specific Question:   Diet:     Answer:   Consistent CHO (Diabetic) [4]     Order Specific Question:   Miscellaneous modifications: (optional)     Answer:   Vegetarian [13]       Assessment:  Active Hospital Problems    Diagnosis    • *Right sided cerebral hemisphere cerebrovascular accident (CVA) (HCC)    • Below-knee amputation (HCC)    • CAD (coronary artery disease)    • HTN (hypertension)    • Vitamin  D deficiency    • Uncontrolled type 2 diabetes mellitus with hyperglycemia (HCC)        Medical Decision Making and Plan:  R CVA - Patient with acute posterior internal capsule infarct with weakness UE > LE complicated by previous L BKA. Patient is improving quickly  Addendum: IGC changed to left body (R Brain)   -PT and OT for mobility and ADLs  -SLP For cognitive screen. Cognitive deficits are mild, would like to focus on physical tasks.      HTN/CAD - Patient on ASA and Plavix. Patient on Metoprolol 12.5 mg BID     DM2 with hyperglycemia - Patient on SSI and Metformin 500 mg BID on transfer.   -Hospitalist consulted. Metformin increased to 1000 mg BID. Into 250s, discussed with hospitlaist and Glipizide added 2.5 mg. Metformin reduced to 500 mg BID and Glipizide increased to BID      L BKA - Limited due to weakness and weight of prosthetic. Monitor for skin breakdown     HLD - Patient on Atorvastatin 80 mg QHS     Neuropathy - Patient on Gabapentin 300 mg QHS     Vitamin D deficiency - 12 on admission. Start on 4000 U, up from 2000 U    GI Ppx - Patient on senna-docusate and PPI. No GI complaints. Discontinue     DVT Ppx - Patient on Lovenox on transfer. Ambulating > 125 feet, discontinue    Total time:  26 minutes.  I spent greater than 50% of the time for patient care, counseling, and coordination on this date, including unit/floor time, and face-to-face time with the patient as per interval events and assessment and plan above. Topics discussed included discharge planning, stroke recovery, daily fluctuation in strength, and discontinue GI medications.     Oliverio Bai M.D.

## 2022-03-04 NOTE — CARE PLAN
Problem: Knowledge Deficit - Standard  Goal: Patient and family/care givers will demonstrate understanding of plan of care, disease process/condition, diagnostic tests and medications  Outcome: Progressing  Patient verbalized the importance of eating a balanced diet and including snacks to avoid low blood sugar.     Problem: Diabetes Management  Goal: Patient's ability to maintain appropriate glucose levels will be maintained or improve  Outcome: Progressing  Patient had a low blood sugar level of 64 before lunch today. Patient given pudding and lunch immediately; patient refused juice and milk. Blood sugar recheck after lunch was 76.    Patient given an afternoon snack.

## 2022-03-04 NOTE — THERAPY
Occupational Therapy  Daily Treatment     Patient Name: Israel Aviles  Age:  56 y.o., Sex:  male  Medical Record #: [id number]  Today's Date: 3/4/2022     Precautions  Precautions: (P) Fall Risk  Comments: (P) L hemiparesis, L BKA and prosthesis         Subjective    Pt agreeable to OT session       Objective       03/04/22 1301   Precautions   Precautions Fall Risk   Comments L hemiparesis, L BKA and prosthesis   Fine Motor / Dexterity    Fine Motor / Dexterity Yes   Fine Motor / Dexterity Interventions Thera putty (green)   Comments  Pt completed theraputty exercise to increase FM coordination and hand strengthening. Pt rolled putty into ball and snake w/ LUE. Pt pinched snack with thumb to each finger 5x each. Pt also used LUe to find and extact 6 beads in theraputty.   Interdisciplinary Plan of Care Collaboration   Patient Position at End of Therapy Seated  (in gym waiting for PT)   OT Total Time Spent   OT Individual Total Time Spent (Mins) 60   OT Charge Group   OT Neuromuscular Re-education / Balance 1   OT Therapy Activity 3   Pt completed pipe tree activity while seated at table. Pt used BUEs (LUE>RUE) to build 3D structure based on 2D picture. Pt required min A to orient wrist to place pipes in connectors ~30% of time.     Pt completed AROM of LUE without weights to address GM control. Pt completed 1 set of 10 bicep curls, front arm raises, side arm raises, pronation/supination, and wrist flexion/extension without weight.     Assessment    Pt tolerated session well focused on LUE function. Pt demonstrated improved GM/FM coordination during tasks. Pt cont to be verymotivated and was given theraputty to cont working on hand strength and FM in room.   Strengths: Supportive family,Willingly participates in therapeutic activities,Pleasant and cooperative,Motivated for self care and independence,Good endurance,Good balance,Effective communication skills,Able to follow instructions  Barriers: Decreased  endurance,Hemiparesis,Limited mobility,Impaired balance    Plan      LUE neuro re-education, ADLs, balance, functional transfers/mobility   w/ tech 3X week      Passport items to be completed:  Perform bathroom transfers, complete dressing, complete feeding, get ready for the day, prepare a simple meal, participate in household tasks, adapt home for safety needs, demonstrate home exercise program, complete caregiver training     Occupational Therapy Goals (Active)       Problem: Bathing       Dates: Start: 02/25/22         Goal: STG-Within one week, patient will bathe with supervision.       Dates: Start: 02/25/22         Goal Note filed on 03/01/22 1017 by Jagruti Granados, OT       Pt cont to require min A to wash RUE                 Problem: OT Long Term Goals       Dates: Start: 02/25/22         Goal: LTG-By discharge, patient will complete basic self care tasks with mod I to supervision.        Dates: Start: 02/25/22            Goal: LTG-By discharge, patient will perform bathroom transfers with mod I to supervision.        Dates: Start: 02/25/22

## 2022-03-04 NOTE — CARE PLAN
The patient is Watcher - Medium risk of patient condition declining or worsening    Shift Goals  Clinical Goals: Safety, Comfort      Problem: Diabetes Management  Goal: Patient's ability to maintain appropriate glucose levels will be maintained or improve  Note: Patient BS have ranged from 120-150 during this shift, will continue to monitor.      Problem: Pain - Standard  Goal: Alleviation of pain or a reduction in pain to the patient’s comfort goal  Note: Patient has no complaints of pain, is able to verbalize pain level and verbalize an acceptable level of pain.

## 2022-03-04 NOTE — PROGRESS NOTES
Patient had a low blood sugar level of 64 before lunch today. Patient given pudding and lunch immediately; patient refused juice and milk. Blood sugar recheck after lunch was 76.     1500   Patient given an afternoon snack.

## 2022-03-05 PROCEDURE — 97116 GAIT TRAINING THERAPY: CPT | Mod: CQ

## 2022-03-05 PROCEDURE — 770010 HCHG ROOM/CARE - REHAB SEMI PRIVAT*

## 2022-03-05 PROCEDURE — 700102 HCHG RX REV CODE 250 W/ 637 OVERRIDE(OP): Performed by: HOSPITALIST

## 2022-03-05 PROCEDURE — 97530 THERAPEUTIC ACTIVITIES: CPT | Mod: CQ

## 2022-03-05 PROCEDURE — 700102 HCHG RX REV CODE 250 W/ 637 OVERRIDE(OP): Performed by: PHYSICAL MEDICINE & REHABILITATION

## 2022-03-05 PROCEDURE — A9270 NON-COVERED ITEM OR SERVICE: HCPCS | Performed by: HOSPITALIST

## 2022-03-05 PROCEDURE — A9270 NON-COVERED ITEM OR SERVICE: HCPCS | Performed by: PHYSICAL MEDICINE & REHABILITATION

## 2022-03-05 PROCEDURE — 97110 THERAPEUTIC EXERCISES: CPT | Mod: CQ

## 2022-03-05 RX ADMIN — ASPIRIN 81 MG: 81 TABLET, COATED ORAL at 08:40

## 2022-03-05 RX ADMIN — GLIPIZIDE 2.5 MG: 5 TABLET ORAL at 16:45

## 2022-03-05 RX ADMIN — METOPROLOL SUCCINATE 12.5 MG: 25 TABLET, EXTENDED RELEASE ORAL at 20:55

## 2022-03-05 RX ADMIN — ATORVASTATIN CALCIUM 80 MG: 40 TABLET, FILM COATED ORAL at 08:40

## 2022-03-05 RX ADMIN — GABAPENTIN 300 MG: 300 CAPSULE ORAL at 20:55

## 2022-03-05 RX ADMIN — METFORMIN HYDROCHLORIDE 500 MG: 500 TABLET ORAL at 16:45

## 2022-03-05 RX ADMIN — Medication 4000 UNITS: at 08:40

## 2022-03-05 RX ADMIN — GLIPIZIDE 2.5 MG: 5 TABLET ORAL at 08:41

## 2022-03-05 RX ADMIN — CLOPIDOGREL BISULFATE 75 MG: 75 TABLET ORAL at 08:40

## 2022-03-05 RX ADMIN — METOPROLOL SUCCINATE 12.5 MG: 25 TABLET, EXTENDED RELEASE ORAL at 08:40

## 2022-03-05 RX ADMIN — METFORMIN HYDROCHLORIDE 500 MG: 500 TABLET ORAL at 08:41

## 2022-03-05 ASSESSMENT — GAIT ASSESSMENTS
DISTANCE (FEET): 150
ASSISTIVE DEVICE: FRONT WHEEL WALKER
DEVIATION: BRADYKINETIC;DECREASED HEEL STRIKE;DECREASED TOE OFF
GAIT LEVEL OF ASSIST: CONTACT GUARD ASSIST

## 2022-03-05 ASSESSMENT — PAIN DESCRIPTION - PAIN TYPE: TYPE: ACUTE PAIN

## 2022-03-05 NOTE — CARE PLAN
"The patient is Stable - Low risk of patient condition declining or worsening    Shift Goals  Clinical Goals: Safety, Comfort  Patient Goals: Safety    Problem: Skin Integrity  Goal: Patient's skin integrity will be maintained or improve  3/5/2022 0342 by Maryann Araujo RMertN.  Outcome: Progressing  Note:   Nakul Score: 20    Patient's skin remains intact and free from new or accidental injury this shift; no s/s of infection. RN wound protocol checked. Encouraged hydration and educated about the importance of nutrition to keep skin integrity. Will continue to monitor.    3/5/2022 0340 by MIGUELINA CuellarN.  Outcome: Progressing  Note:   Nakul Score: 20    Patient's skin remains intact and free from new or accidental injury this shift; no s/s of infection. RN wound protocol checked. Encouraged hydration and educated about the importance of nutrition to keep skin integrity. Will continue to monitor.       Problem: Fall Risk - Rehab  Goal: Patient will remain free from falls  3/5/2022 0342 by Maryann Araujo RMertN.  Outcome: Progressing  Note: Mendy Willingham Fall risk Assessment Score: 12      Moderate fall risk Interventions  - Bed and strip alarm   - Yellow sign by the door   - Yellow wrist band \"Fall risk\"  - Room near to the nurse station  - Do not leave patient unattended in the bathroom  - Fall risk education provided    3/5/2022 0340 by Maryann Araujo RMertN.  Outcome: Progressing  Note: Mendy Willingham Fall risk Assessment Score: 12    Moderate fall risk Interventions  - Bed and strip alarm   - Yellow sign by the door   - Yellow wrist band \"Fall risk\"  - Room near to the nurse station  - Do not leave patient unattended in the bathroom  - Fall risk education provided       "

## 2022-03-05 NOTE — THERAPY
Physical Therapy   Daily Treatment     Patient Name: Israel Aviles  Age:  56 y.o., Sex:  male  Medical Record #: [id number]  Today's Date: 3/4/2022     Precautions  Precautions: Fall Risk  Comments: L hemiplegia and occasional inattention, L BKA prosthetic    Subjective    Patient agreeable to PT; received sitting in w/c at bedside; reports increased fatigue but still motivated today.     Objective       03/04/22 0940   Precautions   Precautions Fall Risk   Comments L hemiplegia and occasional inattention, L BKA prosthetic   Vitals   O2 (LPM) 0   O2 Delivery Device None - Room Air   Wheelchair Functional Level of Assist   Wheelchair Assist Stand by Assist  (but with tactile cueing prn for increased use & attention of LUE, inhibition of RUE)   Distance Wheelchair (Feet or Distance) 110 feet with BUE/BLE but RUE began to overcompensate along with increased inattention to LUE more than LLE.  80 feet with BLE and RUE (no LUE per design) but with increased verbal cueing & tactile ceuing prn.   Wheelchair Description Extra time;Impaired coordination;Limited by fatigue;Supervision for safety;Verbal cueing   Transfer Functional Level of Assist   Bed, Chair, Wheelchair Transfer Contact Guard Assist  (2 reps)   Bed Chair Wheelchair Transfer Description Increased time;Set-up of equipment;Supervision for safety;Verbal cueing  (FWW, BKA prosthetic)   Sitting Lower Body Exercises   Sitting Lower Body Exercises Yes   Nustep Resistance Level 3  (no RUE use. Focus on increased L sided usage today.  BLE with LUE.  0.33 miles, 20 active minutes in a 28 min period, avg 28 steps/min.)   Bed Mobility    Sit to Stand Contact Guard Assist   Scooting Contact Guard Assist   Interdisciplinary Plan of Care Collaboration   Patient Position at End of Therapy Seated;Chair Alarm On;Self Releasing Lap Belt Applied;Call Light within Reach;Tray Table within Reach;Phone within Reach   PT Total Time Spent   PT Individual Total Time Spent (Mins) 60    PT Charge Group   PT Therapeutic Exercise 3   PT Therapeutic Activities 1       Assessment    The structured setting allowed for inhibition of RUE overuse and increased attention and use of LUE/LLE today; good motivation; requires min cueing for increased focus to L sided use; pleasant and cooperative throughout; improving endurance with LLE and LUE use but impaired coordination during first w/c activity noted.    Strengths: Able to follow instructions,Alert and oriented,Effective communication skills,Good insight into deficits/needs,Independent prior level of function,Making steady progress towards goals,Manages pain appropriately,Motivated for self care and independence,Pleasant and cooperative,Supportive family,Willingly participates in therapeutic activities  Barriers: Decreased endurance,Generalized weakness,Impaired activity tolerance,Impaired balance,Limited mobility    Plan    LLE strengthening and neuro re-ed with focus on hip flexors and glutes, forced use as able on LLE and LUE (or inhibition of RUE use), gait with FWW, standing ther ex, stairs, bed mobility with decreased A, curb training w/ FWW, education for SO with hands on training.  D/C scheduled for 3/9/22.    Passport items to be completed:  Passport items to be completed:  Get in/out of bed safely, in/out of a vehicle, safely use mobility device, walk or wheel around home/community, navigate up and down stairs, show how to get up/down from the ground, ensure home is accessible, demonstrate HEP, complete caregiver training    Physical Therapy Problems (Active)       Problem: Mobility       Dates: Start: 02/25/22         Goal: STG-Within one week, patient will ambulate 150 feet with standard FWW, gait belt, L BKA prosthetic, and CGA.       Dates: Start: 02/25/22            Goal: STG-Within one week, patient will ascend and descend four to six stairs with B railings, gait belt, L BKA prosthetic, and Min A.       Dates: Start: 02/25/22           "     Problem: Mobility Transfers       Dates: Start: 02/25/22         Goal: STG-Within one week, patient will perform bed mobility with bed rail, cueing, and SBA.       Dates: Start: 02/25/22               Problem: PT-Long Term Goals       Dates: Start: 02/25/22         Goal: LTG-By discharge, patient will ambulate 300 feet with SPC and L BKA prosthetic at SPV level.       Dates: Start: 02/25/22            Goal: LTG-By discharge, patient will transfer one surface to another with SPC and L BKA prosthetic at IND level.       Dates: Start: 02/25/22            Goal: LTG-By discharge, patient will perform home exercise program with handout and L BKA prosthetic at IND level.       Dates: Start: 02/25/22            Goal: LTG-By discharge, patient will transfer in/out of a car with SPC and L BKA prosthetic at SPV level.       Dates: Start: 02/25/22            Goal: LTG-By discharge, patient will ascend/descend a 4\" curb with SPC and L BKA prosthetic at SPV level.       Dates: Start: 02/25/22              "

## 2022-03-05 NOTE — CARE PLAN
"The patient is Watcher - Medium risk of patient condition declining or worsening    Shift Goals  Clinical Goals: Safety, comfort      Problem: Pain - Standard  Goal: Alleviation of pain or a reduction in pain to the patient’s comfort goal  Note: Patient has no complaints of pain, is able to verbalize pain level and verbalize an acceptable level of pain.       Problem: Fall Risk - Rehab  Goal: Patient will remain free from falls  Note: Mendy Willingham Fall risk Assessment Score: 12  Moderate fall risk Interventions  - Bed and strip alarm   - Yellow sign by the door   - Yellow wrist band \"Fall risk\"  - Do not leave patient unattended in the bathroom  - Fall risk education provided     "

## 2022-03-05 NOTE — THERAPY
Physical Therapy   Daily Treatment     Patient Name: Israel Aviles  Age:  56 y.o., Sex:  male  Medical Record #: [id number]  Today's Date: 3/4/2022     Precautions  Precautions: Fall Risk  Comments: L hemiplegia, occasional L inattention, L BKA prosthetic    Subjective    Patient agreeable to PT; reports decreased fatigue this session.     Objective       03/04/22 1401   Precautions   Precautions Fall Risk   Comments L hemiplegia, occasional L inattention, L BKA prosthetic   Vitals   O2 (LPM) 0   O2 Delivery Device None - Room Air   Standing Lower Body Exercises   Standing Lower Body Exercises Yes  (// bars, gait belt, Min A, use of LUE, inhibition of RUE use except for setup, tibial guarding when in SLS, uses L BKA prosthetic, seated break after each set for 2-3 min)   Hip Flexion 2 sets of 10   Hip Abduction 2 sets of 10   Mini Squat 2 sets of 10;Full    Bed Mobility    Sit to Stand Minimal Assist  (no RUE use per design, in // bars, gait belt, L BKA prosthesis)   Scooting Standby Assist   Interdisciplinary Plan of Care Collaboration   Patient Position at End of Therapy Seated;Chair Alarm On;Self Releasing Lap Belt Applied;Call Light within Reach;Tray Table within Reach;Phone within Reach   PT Total Time Spent   PT Individual Total Time Spent (Mins) 30   PT Charge Group   PT Therapeutic Exercise 2       Assessment    Patient continues to demonstrate high motivation, Min A provided during exercise to increase weight shift to L side prn for facilitation, Min A occasionally due to impaired motor control, highly motivated, requires multiple rest breaks, good non-use of RUE during ther ex and SLS tasks today.    Strengths: Able to follow instructions,Alert and oriented,Effective communication skills,Good insight into deficits/needs,Independent prior level of function,Making steady progress towards goals,Manages pain appropriately,Motivated for self care and independence,Pleasant and cooperative,Supportive  family,Willingly participates in therapeutic activities  Barriers: Decreased endurance,Generalized weakness,Impaired activity tolerance,Impaired balance,Limited mobility    Plan    LLE strengthening and neuro re-ed with focus on hip flexors and glutes, forced use as able on LLE and LUE (or inhibition of RUE use), gait with FWW, standing ther ex, stairs, bed mobility with decreased A, curb training w/ FWW, education for SO with hands on training.  D/C scheduled for 3/9/22.    Passport items to be completed:  Passport items to be completed:  Get in/out of bed safely, in/out of a vehicle, safely use mobility device, walk or wheel around home/community, navigate up and down stairs, show how to get up/down from the ground, ensure home is accessible, demonstrate HEP, complete caregiver training    Physical Therapy Problems (Active)       Problem: Mobility       Dates: Start: 02/25/22         Goal: STG-Within one week, patient will ambulate 150 feet with standard FWW, gait belt, L BKA prosthetic, and CGA.       Dates: Start: 02/25/22            Goal: STG-Within one week, patient will ascend and descend four to six stairs with B railings, gait belt, L BKA prosthetic, and Min A.       Dates: Start: 02/25/22               Problem: Mobility Transfers       Dates: Start: 02/25/22         Goal: STG-Within one week, patient will perform bed mobility with bed rail, cueing, and SBA.       Dates: Start: 02/25/22               Problem: PT-Long Term Goals       Dates: Start: 02/25/22         Goal: LTG-By discharge, patient will ambulate 300 feet with SPC and L BKA prosthetic at SPV level.       Dates: Start: 02/25/22            Goal: LTG-By discharge, patient will transfer one surface to another with SPC and L BKA prosthetic at IND level.       Dates: Start: 02/25/22            Goal: LTG-By discharge, patient will perform home exercise program with handout and L BKA prosthetic at IND level.       Dates: Start: 02/25/22             "Goal: LTG-By discharge, patient will transfer in/out of a car with SPC and L BKA prosthetic at SPV level.       Dates: Start: 02/25/22            Goal: LTG-By discharge, patient will ascend/descend a 4\" curb with SPC and L BKA prosthetic at SPV level.       Dates: Start: 02/25/22              "

## 2022-03-05 NOTE — THERAPY
"Physical Therapy   Daily Treatment     Patient Name: Israel Aviles  Age:  56 y.o., Sex:  male  Medical Record #: [id number]  Today's Date: 3/5/2022     Precautions  Precautions: Fall Risk  Comments: L hemiplegia, occasional L inattention, L BKA prosthetic    Subjective    Pt in bed upon arrival, agreeable to session.  \"I talked to CM yesterday and she said insurance can cover the stimulation machine(refering to Fe)\"     Objective       03/05/22 0931   Pain 0 - 10 Group   Pain Rating Scale (NPRS) 0   Gait Functional Level of Assist    Gait Level Of Assist Contact Guard Assist   Assistive Device Front Wheel Walker   Distance (Feet) 150   # of Times Distance was Traveled 1   Deviation Bradykinetic;Decreased Heel Strike;Decreased Toe Off   Wheelchair Functional Level of Assist   Wheelchair Assist Stand by Assist   Distance Wheelchair (Feet or Distance) 20   Wheelchair Description Extra time;Limited by fatigue;Supervision for safety;Verbal cueing   Stairs Functional Level of Assist   Level of Assist with Stairs Contact Guard Assist   # of Stairs Climbed 12  (6 inches, w/ 2 rest breaks in between)   Stairs Description Extra time;Hand rails;Supervision for safety;Verbal cueing  (cues to increase L hip flex during ascending)   Transfer Functional Level of Assist   Bed, Chair, Wheelchair Transfer Contact Guard Assist   Bed Chair Wheelchair Transfer Description Adaptive equipment;Increased time;Set-up of equipment;Supervision for safety;Verbal cueing  (SPT w/ FWW)   Standing Lower Body Exercises   Mini Squat 2 sets of 10;Full    Comments BLEs tapping in // bars on 5 inches step, cues to increase L hip flex during tapping   Bed Mobility    Supine to Sit Supervised   Sit to Supine Supervised   Sit to Stand Contact Guard Assist   Scooting Standby Assist   Rolling Standby Assist   Interdisciplinary Plan of Care Collaboration   IDT Collaboration with  Therapy Tech   Patient Position at End of Therapy Seated "   Collaboration Comments transition care to tech for    PT Total Time Spent   PT Individual Total Time Spent (Mins) 60   PT Charge Group   PT Gait Training 1   PT Therapeutic Exercise 2   PT Therapeutic Activities 1   Supervising Physical Therapist Rodriguez [place]       Assessment    Pt continues improving increase of L foot clearance during amb. Cue for increase L hip flex during stairs training and the 3rd set showing more control in L foot placement on stairs. Showing high motivation throughout session and would like to have NMES unit at home.    Strengths: Able to follow instructions,Alert and oriented,Effective communication skills,Good insight into deficits/needs,Independent prior level of function,Making steady progress towards goals,Manages pain appropriately,Motivated for self care and independence,Pleasant and cooperative,Supportive family,Willingly participates in therapeutic activities  Barriers: Decreased endurance,Generalized weakness,Impaired activity tolerance,Impaired balance,Limited mobility    Plan    Double check w/ CM Monday for insurance coverage for NMES unit. Education on unit if insurance will cover the cost.  LLE strengthening and neuro re-ed with focus on hip flexors and glutes, forced use as able on LLE and LUE (or inhibition of RUE use), gait with FWW, standing ther ex, stairs, bed mobility with decreased A, curb training w/ FWW, education for SO with hands on training.  D/C scheduled for 3/9/22.     Passport items to be completed:  Get in/out of bed safely, in/out of a vehicle, safely use mobility device, walk or wheel around home/community, navigate up and down stairs, show how to get up/down from the ground, ensure home is accessible, demonstrate HEP, complete caregiver training    Physical Therapy Problems (Active)       Problem: Mobility       Dates: Start: 02/25/22         Goal: STG-Within one week, patient will ambulate 150 feet with standard FWW, gait belt, L BKA prosthetic,  "and CGA.       Dates: Start: 02/25/22            Goal: STG-Within one week, patient will ascend and descend four to six stairs with B railings, gait belt, L BKA prosthetic, and Min A.       Dates: Start: 02/25/22               Problem: Mobility Transfers       Dates: Start: 02/25/22         Goal: STG-Within one week, patient will perform bed mobility with bed rail, cueing, and SBA.       Dates: Start: 02/25/22               Problem: PT-Long Term Goals       Dates: Start: 02/25/22         Goal: LTG-By discharge, patient will ambulate 300 feet with SPC and L BKA prosthetic at SPV level.       Dates: Start: 02/25/22            Goal: LTG-By discharge, patient will transfer one surface to another with SPC and L BKA prosthetic at IND level.       Dates: Start: 02/25/22            Goal: LTG-By discharge, patient will perform home exercise program with handout and L BKA prosthetic at IND level.       Dates: Start: 02/25/22            Goal: LTG-By discharge, patient will transfer in/out of a car with SPC and L BKA prosthetic at SPV level.       Dates: Start: 02/25/22            Goal: LTG-By discharge, patient will ascend/descend a 4\" curb with SPC and L BKA prosthetic at SPV level.       Dates: Start: 02/25/22              "

## 2022-03-05 NOTE — CARE PLAN
"The patient is Stable - Low risk of patient condition declining or worsening    Shift Goals  Clinical Goals: Safety, Comfort  Patient Goals: Safety    Problem: Skin Integrity  Goal: Patient's skin integrity will be maintained or improve  Outcome: Progressing  Note:   Nakul Score: 20    Patient's skin remains intact and free from new or accidental injury this shift; no s/s of infection. RN wound protocol checked. Encouraged hydration and educated about the importance of nutrition to keep skin integrity. Will continue to monitor.       Problem: Fall Risk - Rehab  Goal: Patient will remain free from falls  Outcome: Progressing  Note: Mendy Willingham Fall risk Assessment Score: 12    Moderate fall risk Interventions  - Bed and strip alarm   - Yellow sign by the door   - Yellow wrist band \"Fall risk\"  - Room near to the nurse station  - Do not leave patient unattended in the bathroom  - Fall risk education provided       "

## 2022-03-06 PROCEDURE — A9270 NON-COVERED ITEM OR SERVICE: HCPCS | Performed by: PHYSICAL MEDICINE & REHABILITATION

## 2022-03-06 PROCEDURE — A9270 NON-COVERED ITEM OR SERVICE: HCPCS | Performed by: HOSPITALIST

## 2022-03-06 PROCEDURE — 770010 HCHG ROOM/CARE - REHAB SEMI PRIVAT*

## 2022-03-06 PROCEDURE — 700102 HCHG RX REV CODE 250 W/ 637 OVERRIDE(OP): Performed by: HOSPITALIST

## 2022-03-06 PROCEDURE — 700102 HCHG RX REV CODE 250 W/ 637 OVERRIDE(OP): Performed by: PHYSICAL MEDICINE & REHABILITATION

## 2022-03-06 RX ADMIN — CLOPIDOGREL BISULFATE 75 MG: 75 TABLET ORAL at 08:45

## 2022-03-06 RX ADMIN — METFORMIN HYDROCHLORIDE 500 MG: 500 TABLET ORAL at 08:46

## 2022-03-06 RX ADMIN — ATORVASTATIN CALCIUM 80 MG: 40 TABLET, FILM COATED ORAL at 08:44

## 2022-03-06 RX ADMIN — METOPROLOL SUCCINATE 12.5 MG: 25 TABLET, EXTENDED RELEASE ORAL at 20:31

## 2022-03-06 RX ADMIN — GLIPIZIDE 2.5 MG: 5 TABLET ORAL at 17:25

## 2022-03-06 RX ADMIN — Medication 4000 UNITS: at 08:45

## 2022-03-06 RX ADMIN — GLIPIZIDE 2.5 MG: 5 TABLET ORAL at 08:45

## 2022-03-06 RX ADMIN — METOPROLOL SUCCINATE 12.5 MG: 25 TABLET, EXTENDED RELEASE ORAL at 08:45

## 2022-03-06 RX ADMIN — ASPIRIN 81 MG: 81 TABLET, COATED ORAL at 08:45

## 2022-03-06 RX ADMIN — METFORMIN HYDROCHLORIDE 500 MG: 500 TABLET ORAL at 17:24

## 2022-03-06 RX ADMIN — GABAPENTIN 300 MG: 300 CAPSULE ORAL at 20:31

## 2022-03-06 RX ADMIN — SENNOSIDES AND DOCUSATE SODIUM 2 TABLET: 50; 8.6 TABLET ORAL at 08:49

## 2022-03-06 ASSESSMENT — FIBROSIS 4 INDEX: FIB4 SCORE: 1.93

## 2022-03-06 ASSESSMENT — PATIENT HEALTH QUESTIONNAIRE - PHQ9
SUM OF ALL RESPONSES TO PHQ9 QUESTIONS 1 AND 2: 0
2. FEELING DOWN, DEPRESSED, IRRITABLE, OR HOPELESS: NOT AT ALL
SUM OF ALL RESPONSES TO PHQ9 QUESTIONS 1 AND 2: 0
1. LITTLE INTEREST OR PLEASURE IN DOING THINGS: NOT AT ALL
1. LITTLE INTEREST OR PLEASURE IN DOING THINGS: NOT AT ALL
2. FEELING DOWN, DEPRESSED, IRRITABLE, OR HOPELESS: NOT AT ALL

## 2022-03-06 ASSESSMENT — PAIN DESCRIPTION - PAIN TYPE: TYPE: ACUTE PAIN

## 2022-03-06 NOTE — CARE PLAN
The patient is Stable - Low risk of patient condition declining or worsening    Shift Goals  Clinical Goals: Pain management  Patient Goals: Pain control    Progress made toward(s) clinical / shift goals:    Problem: Pain - Standard  Goal: Alleviation of pain or a reduction in pain to the patient’s comfort goal  Outcome: Progressing   Patient able to verbalize pain level and verbalize an acceptable level of pain.

## 2022-03-07 PROCEDURE — 97032 APPL MODALITY 1+ESTIM EA 15: CPT

## 2022-03-07 PROCEDURE — 700102 HCHG RX REV CODE 250 W/ 637 OVERRIDE(OP): Performed by: PHYSICAL MEDICINE & REHABILITATION

## 2022-03-07 PROCEDURE — 700102 HCHG RX REV CODE 250 W/ 637 OVERRIDE(OP): Performed by: HOSPITALIST

## 2022-03-07 PROCEDURE — 97530 THERAPEUTIC ACTIVITIES: CPT

## 2022-03-07 PROCEDURE — A9270 NON-COVERED ITEM OR SERVICE: HCPCS | Performed by: PHYSICAL MEDICINE & REHABILITATION

## 2022-03-07 PROCEDURE — 97110 THERAPEUTIC EXERCISES: CPT

## 2022-03-07 PROCEDURE — A9270 NON-COVERED ITEM OR SERVICE: HCPCS | Performed by: HOSPITALIST

## 2022-03-07 PROCEDURE — 770010 HCHG ROOM/CARE - REHAB SEMI PRIVAT*

## 2022-03-07 PROCEDURE — 97112 NEUROMUSCULAR REEDUCATION: CPT

## 2022-03-07 PROCEDURE — 97116 GAIT TRAINING THERAPY: CPT

## 2022-03-07 PROCEDURE — 99232 SBSQ HOSP IP/OBS MODERATE 35: CPT | Performed by: PHYSICAL MEDICINE & REHABILITATION

## 2022-03-07 RX ORDER — VITAMIN B COMPLEX
2000 TABLET ORAL DAILY
Status: DISCONTINUED | OUTPATIENT
Start: 2022-03-08 | End: 2022-03-12 | Stop reason: HOSPADM

## 2022-03-07 RX ORDER — METOPROLOL SUCCINATE 25 MG/1
25 TABLET, EXTENDED RELEASE ORAL 2 TIMES DAILY
Status: DISCONTINUED | OUTPATIENT
Start: 2022-03-07 | End: 2022-03-09

## 2022-03-07 RX ADMIN — GLIPIZIDE 2.5 MG: 5 TABLET ORAL at 08:14

## 2022-03-07 RX ADMIN — CLOPIDOGREL BISULFATE 75 MG: 75 TABLET ORAL at 08:14

## 2022-03-07 RX ADMIN — METOPROLOL SUCCINATE 12.5 MG: 25 TABLET, EXTENDED RELEASE ORAL at 08:14

## 2022-03-07 RX ADMIN — METFORMIN HYDROCHLORIDE 500 MG: 500 TABLET ORAL at 17:17

## 2022-03-07 RX ADMIN — METOPROLOL SUCCINATE 25 MG: 25 TABLET, EXTENDED RELEASE ORAL at 20:41

## 2022-03-07 RX ADMIN — Medication 4000 UNITS: at 08:13

## 2022-03-07 RX ADMIN — ASPIRIN 81 MG: 81 TABLET, COATED ORAL at 08:14

## 2022-03-07 RX ADMIN — ATORVASTATIN CALCIUM 80 MG: 40 TABLET, FILM COATED ORAL at 08:13

## 2022-03-07 RX ADMIN — GABAPENTIN 300 MG: 300 CAPSULE ORAL at 20:41

## 2022-03-07 RX ADMIN — METFORMIN HYDROCHLORIDE 500 MG: 500 TABLET ORAL at 08:14

## 2022-03-07 RX ADMIN — GLIPIZIDE 2.5 MG: 5 TABLET ORAL at 17:18

## 2022-03-07 ASSESSMENT — PATIENT HEALTH QUESTIONNAIRE - PHQ9
SUM OF ALL RESPONSES TO PHQ9 QUESTIONS 1 AND 2: 0
2. FEELING DOWN, DEPRESSED, IRRITABLE, OR HOPELESS: NOT AT ALL
1. LITTLE INTEREST OR PLEASURE IN DOING THINGS: NOT AT ALL
1. LITTLE INTEREST OR PLEASURE IN DOING THINGS: NOT AT ALL
SUM OF ALL RESPONSES TO PHQ9 QUESTIONS 1 AND 2: 0
2. FEELING DOWN, DEPRESSED, IRRITABLE, OR HOPELESS: NOT AT ALL

## 2022-03-07 ASSESSMENT — GAIT ASSESSMENTS
GAIT LEVEL OF ASSIST: CONTACT GUARD ASSIST
DEVIATION: BRADYKINETIC;DECREASED TOE OFF;DECREASED HEEL STRIKE
DISTANCE (FEET): 150
ASSISTIVE DEVICE: FRONT WHEEL WALKER;PARALLEL BARS
DISTANCE (FEET): 180
ASSISTIVE DEVICE: FRONT WHEEL WALKER
GAIT LEVEL OF ASSIST: CONTACT GUARD ASSIST

## 2022-03-07 ASSESSMENT — PAIN DESCRIPTION - PAIN TYPE: TYPE: ACUTE PAIN

## 2022-03-07 ASSESSMENT — ACTIVITIES OF DAILY LIVING (ADL)
BED_CHAIR_WHEELCHAIR_TRANSFER_DESCRIPTION: ADAPTIVE EQUIPMENT;INCREASED TIME;SUPERVISION FOR SAFETY;VERBAL CUEING
BED_CHAIR_WHEELCHAIR_TRANSFER_DESCRIPTION: ADAPTIVE EQUIPMENT;SET-UP OF EQUIPMENT;VERBAL CUEING;SUPERVISION FOR SAFETY

## 2022-03-07 NOTE — THERAPY
03/07/22 0929   Precautions   Precautions Fall Risk   Comments L hemiparesis, occasional L inattention, L BKA   Pain 0 - 10 Group   Therapist Pain Assessment 0   Cognition    Level of Consciousness Alert   ABS (Agitated Behavior Scale)   Agitated Behavior Scale Performed No   Gait Functional Level of Assist    Gait Level Of Assist Contact Guard Assist   Assistive Device Front Wheel Walker   Distance (Feet) 180   # of Times Distance was Traveled 2  (180 FT x1, 260 FT x1)   Transfer Functional Level of Assist   Bed, Chair, Wheelchair Transfer Contact Guard Assist   Bed Chair Wheelchair Transfer Description Adaptive equipment;Increased time;Supervision for safety;Verbal cueing  (FWW)   Sitting Lower Body Exercises   Nustep Resistance Level 3  (38 steps per minute, 22 minutes)   Bed Mobility    Supine to Sit Supervised   Sit to Supine Supervised   Sit to Stand Contact Guard Assist   Scooting Standby Assist   Neuro-Muscular Treatments   Neuro-Muscular Treatments Sequencing;Verbal Cuing;Weight Shift Right;Weight Shift Left;Anterior weight shift   Comments Sequencing sit to/from stand, sequencing gait fww dynamic standing weight shift   PT Total Time Spent   PT Individual Total Time Spent (Mins) 60   PT Charge Group   PT Gait Training 1   PT Therapeutic Exercise 2   PT Therapeutic Activities 1   Physical Therapy   Daily Treatment     Patient Name: Israel Aviles  Age:  56 y.o., Sex:  male  Medical Record #: [id number]  Today's Date: 3/7/2022     Precautions  Precautions: Fall Risk  Comments: L hemiparesis, occasional L inattention, L BKA    Subjective    The patient was sitting up in bed after breakfast.  He agreed to PT.     Objective    The patient participated in putting on his prosthesis LLE and right shoe sitting edge of bed.  He was able to use his LUE to put on the sleeve and manage the prosthesis placement.  He also put on his left shoe.  He participated in gait training with a fww and he tolerated 180 FT x1  and 260 FT x1 with a fww and intermittent CGA.  The patient also utilized study NuStep for 22 min for coordinated movement pattern.    Assessment    The patient has made some progress in using the left upper extremity as represented by being able to manage his prosthesis using both hands to put it on.  He is able to use the fww for transferring and gait training.  He had 1 loss of balance when he had to step backward approximately 1 foot to sit in the wheelchair.  Strengths: Able to follow instructions,Alert and oriented,Effective communication skills,Good insight into deficits/needs,Independent prior level of function,Making steady progress towards goals,Manages pain appropriately,Motivated for self care and independence,Pleasant and cooperative,Supportive family,Willingly participates in therapeutic activities  Barriers: Decreased endurance,Generalized weakness,Impaired activity tolerance,Impaired balance,Limited mobility    Plan    Safety education, neuromuscular reeducation LLE and LUE, gait training to include stepping backward and frequent change of direction    Passport items to be completed:  [unfilled]    Physical Therapy Problems (Active)       Problem: Mobility       Dates: Start: 02/25/22         Goal: STG-Within one week, patient will ambulate 150 feet with standard FWW, gait belt, L BKA prosthetic, and CGA.       Dates: Start: 02/25/22            Goal: STG-Within one week, patient will ascend and descend four to six stairs with B railings, gait belt, L BKA prosthetic, and Min A.       Dates: Start: 02/25/22               Problem: Mobility Transfers       Dates: Start: 02/25/22         Goal: STG-Within one week, patient will perform bed mobility with bed rail, cueing, and SBA.       Dates: Start: 02/25/22               Problem: PT-Long Term Goals       Dates: Start: 02/25/22         Goal: LTG-By discharge, patient will ambulate 300 feet with SPC and L BKA prosthetic at SPV level.       Dates: Start:  "02/25/22            Goal: LTG-By discharge, patient will transfer one surface to another with SPC and L BKA prosthetic at IND level.       Dates: Start: 02/25/22            Goal: LTG-By discharge, patient will perform home exercise program with handout and L BKA prosthetic at IND level.       Dates: Start: 02/25/22            Goal: LTG-By discharge, patient will transfer in/out of a car with SPC and L BKA prosthetic at SPV level.       Dates: Start: 02/25/22            Goal: LTG-By discharge, patient will ascend/descend a 4\" curb with SPC and L BKA prosthetic at SPV level.       Dates: Start: 02/25/22              "

## 2022-03-07 NOTE — PROGRESS NOTES
Rehab Progress Note     Encounter Date: 3/7/2022    CC: Decreased mobility, left sided weakness    Interval Events (Subjective)  Patient sitting up in therapy gym. He reports he is feeling stronger daily. He would like to work on walking in the [employer] system to give himself confidence for walking alone. Patient reportedly walked > 250 feet with FWW. Hospitalist signed off over the weekend. SBP into 150s today. Will increase Troprolol.     IDT Team Meeting 3/1/2022  DC/Disposition:  3/9/22    Objective:  VITAL SIGNS: /84   Pulse 80   Temp 37.1 °C (98.8 °F) (Temporal)   Resp 20   Ht 1.829 m (6')   Wt 73 kg (160 lb 15 oz)   SpO2 93%   BMI 21.83 kg/m²   Gen: NAD  Psych: Mood and affect appropriate  CV: RRR, no edema  Resp: CTAB, no upper airway sounds  Abd: NTND  Neuro: AOx4, 4/5 LUE    No results found for this or any previous visit (from the past 72 hour(s)).    Current Facility-Administered Medications   Medication Frequency   • glipiZIDE (GLUCOTROL) tablet 2.5 mg BID AC   • metFORMIN (GLUCOPHAGE) tablet 500 mg BID WITH MEALS   • vitamin D3 (cholecalciferol) tablet 4,000 Units DAILY   • senna-docusate (PERICOLACE or SENOKOT S) 8.6-50 MG per tablet 2 Tablet BID W/MEALS PRN    And   • polyethylene glycol/lytes (MIRALAX) PACKET 1 Packet QDAY PRN    And   • magnesium hydroxide (MILK OF MAGNESIA) suspension 30 mL QDAY PRN    And   • bisacodyl (DULCOLAX) suppository 10 mg QDAY PRN   • metoprolol SR (TOPROL XL) tablet 12.5 mg BID   • Respiratory Therapy Consult Continuous RT   • hydrALAZINE (APRESOLINE) tablet 25 mg Q8HRS PRN   • acetaminophen (Tylenol) tablet 650 mg Q4HRS PRN   • artificial tears ophthalmic solution 1 Drop PRN   • benzocaine-menthol (Cepacol) lozenge 1 Lozenge Q2HRS PRN   • mag hydrox-al hydrox-simeth (MAALOX PLUS ES or MYLANTA DS) suspension 20 mL Q2HRS PRN   • ondansetron (ZOFRAN ODT) dispertab 4 mg 4X/DAY PRN    Or   • ondansetron (ZOFRAN) syringe/vial injection 4 mg 4X/DAY PRN   •  traZODone (DESYREL) tablet 50 mg QHS PRN   • sodium chloride (OCEAN) 0.65 % nasal spray 2 Spray PRN   • oxyCODONE immediate-release (ROXICODONE) tablet 5 mg Q3HRS PRN    Or   • oxyCODONE immediate release (ROXICODONE) tablet 10 mg Q3HRS PRN   • aspirin EC (ECOTRIN) tablet 81 mg DAILY   • atorvastatin (LIPITOR) tablet 80 mg DAILY   • clopidogrel (PLAVIX) tablet 75 mg DAILY   • gabapentin (NEURONTIN) capsule 300 mg Q EVENING   • promethazine (PHENERGAN) suppository 12.5-25 mg Q4HRS PRN   • promethazine (PHENERGAN) tablet 12.5-25 mg Q4HRS PRN       Orders Placed This Encounter   Procedures   • Diet Order Diet: Consistent CHO (Diabetic); Miscellaneous modifications: (optional): Vegetarian     Standing Status:   Standing     Number of Occurrences:   1     Order Specific Question:   Diet:     Answer:   Consistent CHO (Diabetic) [4]     Order Specific Question:   Miscellaneous modifications: (optional)     Answer:   Vegetarian [13]       Assessment:  Active Hospital Problems    Diagnosis    • *Right sided cerebral hemisphere cerebrovascular accident (CVA) (HCC)    • Below-knee amputation (HCC)    • CAD (coronary artery disease)    • HTN (hypertension)    • Vitamin D deficiency    • Uncontrolled type 2 diabetes mellitus with hyperglycemia (HCC)        Medical Decision Making and Plan:  R CVA - Patient with acute posterior internal capsule infarct with weakness UE > LE complicated by previous L BKA. Patient is improving quickly  Addendum: IGC changed to left body (R Brain)   -PT and OT for mobility and ADLs  -SLP For cognitive screen. Cognitive deficits are mild, would like to focus on physical tasks.      HTN/CAD - Patient on ASA and Plavix. Patient on Metoprolol 12.5 mg BID. Into 150s, increase to 25 mg BID     DM2 with hyperglycemia - Patient on SSI and Metformin 500 mg BID on transfer.   -Hospitalist consulted. Metformin increased to 1000 mg BID. Into 250s, discussed with hospitlaist and Glipizide added 2.5 mg. Metformin  reduced to 500 mg BID and Glipizide increased to BID      L BKA - Limited due to weakness and weight of prosthetic. Monitor for skin breakdown     HLD - Patient on Atorvastatin 80 mg QHS     Neuropathy - Patient on Gabapentin 300 mg QHS     Vitamin D deficiency - 12 on admission. Start on 4000 U -> reduce to 2000 U     GI Ppx - Patient on senna-docusate and PPI. No GI complaints. Discontinue     DVT Ppx - Patient on Lovenox on transfer. Ambulating > 125 feet, discontinue    Total time:  26 minutes.  I spent greater than 50% of the time for patient care, counseling, and coordination on this date, including unit/floor time, and face-to-face time with the patient as per interval events and assessment and plan above. Topics discussed included improving mobility, elevated SBP and increase metoprolol.     Oliverio Bai M.D.

## 2022-03-07 NOTE — CARE PLAN
"The patient is Stable - Low risk of patient condition declining or worsening    Shift Goals  Clinical Goals: Pain management  Patient Goals: Safety    Problem: Skin Integrity  Goal: Patient's skin integrity will be maintained or improve  Outcome: Progressing  Note:   Nakul Score: 20    Patient's skin remains intact and free from new or accidental injury this shift; no s/s of infection. RN wound protocol checked. Encouraged hydration and educated about the importance of nutrition to keep skin integrity. Will continue to monitor.       Problem: Fall Risk - Rehab  Goal: Patient will remain free from falls  Outcome: Progressing  Note: Mendy Willingham Fall risk Assessment Score: 12    Moderate fall risk Interventions  - Bed and strip alarm   - Yellow sign by the door   - Yellow wrist band \"Fall risk\"  - Room near to the nurse station  - Do not leave patient unattended in the bathroom  - Fall risk education provided        "

## 2022-03-07 NOTE — THERAPY
Occupational Therapy  Daily Treatment     Patient Name: Israel Aviles  Age:  56 y.o., Sex:  male  Medical Record #: [id number]  Today's Date: 3/7/2022     Precautions  Precautions: (P) Fall Risk  Comments: (P) L hemiparesis, occasional L inattention, L BKA         Subjective    Pt received up in w/c, agreeable to OT session, reported pain in L shoulder with  over the weekend and would like to adjust settings     Objective       03/07/22 1031   Precautions   Precautions Fall Risk   Comments L hemiparesis, occasional L inattention, L BKA   Neuro-Muscular Treatments   Neuro-Muscular Treatments Electrical Stimulation;Postural Facilitation;Joint Approximation   Comments FES stim adjusted on  as pt reporting stim was causing pain over the weekend. stim reduced in anterior deltoid with improved tolerance. pt completed 6.22 miles with 28.41 min active, 1:52 passive, avg power 7.2W with L 10% symmetry   OT Total Time Spent   OT Individual Total Time Spent (Mins) 60   OT Charge Group   OT Electrical Stimulation Attended 4       Assessment    Pt tolerated session well, focus on progression of LUE function using FES via  UE cycle, pt with good tolerance with slight reduction in deltoid stimulation. Demos continued improvements in LUE active motor control, active shoulder stabilizers with full ROM but delayed relative to LUE and not automatically activating to provide full joint support, continues to benefit from shoulder support tape and support in sitting to maintain joint integrity.     Strengths: Supportive family,Willingly participates in therapeutic activities,Pleasant and cooperative,Motivated for self care and independence,Good endurance,Good balance,Effective communication skills,Able to follow instructions  Barriers: Decreased endurance,Hemiparesis,Limited mobility,Impaired balance    Plan    LUE neuro re-education, ADLs, balance, functional transfers/mobility   w/ tech 3X week      Passport  items to be completed:  Perform bathroom transfers, complete dressing, complete feeding, get ready for the day, prepare a simple meal, participate in household tasks, adapt home for safety needs, demonstrate home exercise program, complete caregiver training     Occupational Therapy Goals (Active)       Problem: Bathing       Dates: Start: 02/25/22         Goal: STG-Within one week, patient will bathe with supervision.       Dates: Start: 02/25/22         Goal Note filed on 03/01/22 1017 by Jagruti Granados, OT       Pt cont to require min A to wash RUE                 Problem: OT Long Term Goals       Dates: Start: 02/25/22         Goal: LTG-By discharge, patient will complete basic self care tasks with mod I to supervision.        Dates: Start: 02/25/22            Goal: LTG-By discharge, patient will perform bathroom transfers with mod I to supervision.        Dates: Start: 02/25/22

## 2022-03-08 PROCEDURE — 97535 SELF CARE MNGMENT TRAINING: CPT | Mod: CO

## 2022-03-08 PROCEDURE — 97110 THERAPEUTIC EXERCISES: CPT | Mod: CQ

## 2022-03-08 PROCEDURE — A9270 NON-COVERED ITEM OR SERVICE: HCPCS | Performed by: HOSPITALIST

## 2022-03-08 PROCEDURE — A9270 NON-COVERED ITEM OR SERVICE: HCPCS | Performed by: PHYSICAL MEDICINE & REHABILITATION

## 2022-03-08 PROCEDURE — 97116 GAIT TRAINING THERAPY: CPT | Mod: CQ

## 2022-03-08 PROCEDURE — 700102 HCHG RX REV CODE 250 W/ 637 OVERRIDE(OP): Performed by: PHYSICAL MEDICINE & REHABILITATION

## 2022-03-08 PROCEDURE — 97530 THERAPEUTIC ACTIVITIES: CPT | Mod: CO

## 2022-03-08 PROCEDURE — 99233 SBSQ HOSP IP/OBS HIGH 50: CPT | Performed by: PHYSICAL MEDICINE & REHABILITATION

## 2022-03-08 PROCEDURE — 770010 HCHG ROOM/CARE - REHAB SEMI PRIVAT*

## 2022-03-08 PROCEDURE — 97530 THERAPEUTIC ACTIVITIES: CPT | Mod: CQ

## 2022-03-08 PROCEDURE — 700102 HCHG RX REV CODE 250 W/ 637 OVERRIDE(OP): Performed by: HOSPITALIST

## 2022-03-08 RX ADMIN — ASPIRIN 81 MG: 81 TABLET, COATED ORAL at 08:16

## 2022-03-08 RX ADMIN — GLIPIZIDE 2.5 MG: 5 TABLET ORAL at 08:16

## 2022-03-08 RX ADMIN — METFORMIN HYDROCHLORIDE 500 MG: 500 TABLET ORAL at 17:43

## 2022-03-08 RX ADMIN — METOPROLOL SUCCINATE 12.5 MG: 25 TABLET, EXTENDED RELEASE ORAL at 08:16

## 2022-03-08 RX ADMIN — GABAPENTIN 300 MG: 300 CAPSULE ORAL at 20:46

## 2022-03-08 RX ADMIN — METFORMIN HYDROCHLORIDE 500 MG: 500 TABLET ORAL at 08:16

## 2022-03-08 RX ADMIN — CLOPIDOGREL BISULFATE 75 MG: 75 TABLET ORAL at 08:16

## 2022-03-08 RX ADMIN — GLIPIZIDE 2.5 MG: 5 TABLET ORAL at 17:43

## 2022-03-08 RX ADMIN — ATORVASTATIN CALCIUM 80 MG: 40 TABLET, FILM COATED ORAL at 08:15

## 2022-03-08 RX ADMIN — METOPROLOL SUCCINATE 25 MG: 25 TABLET, EXTENDED RELEASE ORAL at 20:46

## 2022-03-08 RX ADMIN — Medication 2000 UNITS: at 08:23

## 2022-03-08 ASSESSMENT — PATIENT HEALTH QUESTIONNAIRE - PHQ9
1. LITTLE INTEREST OR PLEASURE IN DOING THINGS: NOT AT ALL
2. FEELING DOWN, DEPRESSED, IRRITABLE, OR HOPELESS: NOT AT ALL
SUM OF ALL RESPONSES TO PHQ9 QUESTIONS 1 AND 2: 0
2. FEELING DOWN, DEPRESSED, IRRITABLE, OR HOPELESS: NOT AT ALL
1. LITTLE INTEREST OR PLEASURE IN DOING THINGS: NOT AT ALL
SUM OF ALL RESPONSES TO PHQ9 QUESTIONS 1 AND 2: 0

## 2022-03-08 ASSESSMENT — GAIT ASSESSMENTS
GAIT LEVEL OF ASSIST: CONTACT GUARD ASSIST
DISTANCE (FEET): 180
ASSISTIVE DEVICE: FRONT WHEEL WALKER
DEVIATION: BRADYKINETIC;DECREASED TOE OFF;DECREASED HEEL STRIKE

## 2022-03-08 ASSESSMENT — ACTIVITIES OF DAILY LIVING (ADL): BED_CHAIR_WHEELCHAIR_TRANSFER_DESCRIPTION: ADAPTIVE EQUIPMENT;INCREASED TIME;SUPERVISION FOR SAFETY;VERBAL CUEING

## 2022-03-08 ASSESSMENT — PAIN DESCRIPTION - PAIN TYPE: TYPE: ACUTE PAIN

## 2022-03-08 NOTE — DISCHARGE PLANNING
CM  IDt held today.   Pt is cga with mobility  Supervised to mod indep with OT  Pt requesting to extend stay at [facility].   Due to above functional status and only 1 therapy need, he does not meet criteria for in pt rehab.  FWW ordered; pt has wc at home  Referral sent to [facility].    Plan:  Dc 3/9/2021

## 2022-03-08 NOTE — THERAPY
Physical Therapy   Daily Treatment     Patient Name: Israel Aviles  Age:  56 y.o., Sex:  male  Medical Record #: [id number]  Today's Date: 3/8/2022     Precautions  Precautions: Fall Risk  Comments: L hemiparesis, occasional L inattention, L BKA    Subjective    Pt in bed upon arrival, agreeable to session.     Objective       03/08/22 1501   Pain 0 - 10 Group   Pain Rating Scale (NPRS) 0   Cognition    Level of Consciousness Alert   Supine Lower Body Exercise   Bridges One Legged;1 set of 10   Hip Abduction Side Lying;1 set of 10;Left   Straight Leg Raises 1 set of 10;Front;Back ;Left  (L hip ext in sidelying)   Bed Mobility    Supine to Sit Modified Independent   Sit to Supine Modified Independent   Scooting Supervised   Rolling Modified Independent   Interdisciplinary Plan of Care Collaboration   Patient Position at End of Therapy In Bed;Call Light within Reach;Phone within Reach;Tray Table within Reach   PT Total Time Spent   PT Individual Total Time Spent (Mins) 30   PT Charge Group   PT Therapeutic Exercise 1   PT Therapeutic Activities 1   Supervising Physical Therapist Montse Cabrera     Delivered and education on supine and standing LE HEP handout.  Discussed d/c plan- recommending pt to utilize w/c at home when alone and push off w/ RUE during xfer.  Pt stated he wants to purchase [email or web address] unit, therapist showing the unit online and education to take the unit to OP PT and receive education from there.    Assessment    Pt showing disappointment that d/c date was not able to be push back, but still willing to receive information/education for HEP and safety. FT is set for day of d/c from 1624-1765 for PT+OT.    Strengths: Able to follow instructions,Alert and oriented,Effective communication skills,Good insight into deficits/needs,Independent prior level of function,Making steady progress towards goals,Manages pain appropriately,Motivated for self care and independence,Pleasant and cooperative,Supportive  "family,Willingly participates in therapeutic activities  Barriers: Decreased endurance,Generalized weakness,Impaired activity tolerance,Impaired balance,Limited mobility    Plan    FT day of d/c 3/9/22, amb w/ FWW, curb training and car xfer    Passport items to be completed:  show how to get up/down from the ground, complete caregiver training    Physical Therapy Problems (Active)       Problem: PT-Long Term Goals       Dates: Start: 02/25/22         Goal: LTG-By discharge, patient will ambulate 300 feet with SPC and L BKA prosthetic at SPV level.       Dates: Start: 02/25/22            Goal: LTG-By discharge, patient will transfer one surface to another with SPC and L BKA prosthetic at IND level.       Dates: Start: 02/25/22            Goal: LTG-By discharge, patient will perform home exercise program with handout and L BKA prosthetic at IND level.       Dates: Start: 02/25/22            Goal: LTG-By discharge, patient will transfer in/out of a car with SPC and L BKA prosthetic at SPV level.       Dates: Start: 02/25/22            Goal: LTG-By discharge, patient will ascend/descend a 4\" curb with SPC and L BKA prosthetic at SPV level.       Dates: Start: 02/25/22              "

## 2022-03-08 NOTE — THERAPY
Physical Therapy   Daily Treatment     Patient Name: Israel Aviles  Age:  56 y.o., Sex:  male  Medical Record #: [id number]  Today's Date: 3/8/2022     Precautions  Precautions: Fall Risk  Comments: L hemiparesis, occasional L inattention, L BKA    Subjective    Pt in bed upon arrival, agreeable to session.  Stated would like to stay in rehab for another week to build up confidence for himself, will discuss in team conf this afternoon and let pt know.     Objective       03/08/22 0831   Pain 0 - 10 Group   Pain Rating Scale (NPRS) 0   Cognition    Level of Consciousness Alert   Gait Functional Level of Assist    Gait Level Of Assist Contact Guard Assist   Assistive Device Front Wheel Walker   Distance (Feet) 180   # of Times Distance was Traveled 2   Deviation Bradykinetic;Decreased Toe Off;Decreased Heel Strike   Stairs Functional Level of Assist   Level of Assist with Stairs Contact Guard Assist   # of Stairs Climbed 12  (6 inches, w/ 1 rest break)   Stairs Description Extra time;Hand rails;Limited by fatigue;Supervision for safety;Verbal cueing   Transfer Functional Level of Assist   Bed, Chair, Wheelchair Transfer Contact Guard Assist   Bed Chair Wheelchair Transfer Description Adaptive equipment;Increased time;Supervision for safety;Verbal cueing  (SPT w/ FWW)   Bed Mobility    Supine to Sit Supervised   Sit to Supine Supervised   Sit to Stand Contact Guard Assist   Scooting Supervised   Rolling Supervised   Interdisciplinary Plan of Care Collaboration   IDT Collaboration with  Certified O.T. Assistant  (CURRAN)   Patient Position at End of Therapy Seated;Tray Table within Reach;Phone within Reach;Call Light within Reach   Collaboration Comments CLOF   PT Total Time Spent   PT Individual Total Time Spent (Mins) 60   PT Charge Group   PT Gait Training 2   PT Therapeutic Activities 2   Supervising Physical Therapist Montse Cabrera     6 inches Curb training w/ FWW x2 w/ CGA  Car xfer into [place] w/ CGA and vcs for  scooting to the edge    Completed d/c irfpai assessment  Initiate passport signing off.    Assessment    Pt tolerated session well w/ no LOB throughout session during amb, stairs and curb training. Pt stated feeling weak this morning and stated took morning meds right before session and that might be the reason, no c/o of lightheaded and dizziness. Pt perform STS better when pushing off from RUE and keep LUE on FWW, but encourage pt to practice pushing off w/ LUE during therapy session.    Strengths: Able to follow instructions,Alert and oriented,Effective communication skills,Good insight into deficits/needs,Independent prior level of function,Making steady progress towards goals,Manages pain appropriately,Motivated for self care and independence,Pleasant and cooperative,Supportive family,Willingly participates in therapeutic activities  Barriers: Decreased endurance,Generalized weakness,Impaired activity tolerance,Impaired balance,Limited mobility    Plan    Double check w/ CM Monday for insurance coverage for NMES unit. Education on unit if insurance will cover the cost.  LLE strengthening and neuro re-ed with focus on hip flexors and glutes, forced use as able on LLE and LUE (or inhibition of RUE use), education for SO with hands on training.   D/C scheduled for 3/9/22.     Passport items to be completed:  show how to get up/down from the ground, complete caregiver training       Physical Therapy Problems (Active)       Problem: PT-Long Term Goals       Dates: Start: 02/25/22         Goal: LTG-By discharge, patient will ambulate 300 feet with SPC and L BKA prosthetic at SPV level.       Dates: Start: 02/25/22            Goal: LTG-By discharge, patient will transfer one surface to another with SPC and L BKA prosthetic at IND level.       Dates: Start: 02/25/22            Goal: LTG-By discharge, patient will perform home exercise program with handout and L BKA prosthetic at IND level.       Dates: Start: 02/25/22   "          Goal: LTG-By discharge, patient will transfer in/out of a car with SPC and L BKA prosthetic at SPV level.       Dates: Start: 02/25/22            Goal: LTG-By discharge, patient will ascend/descend a 4\" curb with SPC and L BKA prosthetic at SPV level.       Dates: Start: 02/25/22              "

## 2022-03-08 NOTE — CARE PLAN
"  Problem: Pain - Standard  Goal: Alleviation of pain or a reduction in pain to the patient’s comfort goal  Note: Pain is manageable with scheduled medication.Repositioned with pillows for comfort.Will continue to monitor and assess pain level and medicate as needed.     Problem: Fall Risk - Rehab  Goal: Patient will remain free from falls  Note: Mendy Willingham Fall risk Assessment Score: 12    Moderate fall risk Interventions  - Bed and strip alarm   - Yellow sign by the door   - Yellow wrist band \"Fall risk\"  - Room near to the nurse station  - Do not leave patient unattended in the bathroom  - Fall risk education provided                   "

## 2022-03-08 NOTE — CARE PLAN
Problem: Bathing  Goal: STG-Within one week, patient will bathe with supervision.  Outcome: Met

## 2022-03-08 NOTE — CARE PLAN
Problem: Mobility  Goal: STG-Within one week, patient will ambulate 150 feet with standard FWW, gait belt, L BKA prosthetic, and CGA.  Outcome: Met  Goal: STG-Within one week, patient will ascend and descend four to six stairs with B railings, gait belt, L BKA prosthetic, and Min A.  Outcome: Met     Problem: Mobility Transfers  Goal: STG-Within one week, patient will perform bed mobility with bed rail, cueing, and SBA.  Outcome: Met

## 2022-03-08 NOTE — THERAPY
"Occupational Therapy  Daily Treatment     Patient Name: Israel Aviles  Age:  56 y.o., Sex:  male  Medical Record #: [id number]  Today's Date: 3/8/2022     Precautions  Precautions: (P) Fall Risk  Comments: (P) L hemiparesis, occasional L inattention, L BKA         Subjective    \" I don't feel I am ready to go home tomorrow  My left leg and arm are still weak.\"     Objective       03/08/22 1001   Precautions   Precautions Fall Risk   Comments L hemiparesis, occasional L inattention, L BKA   Functional Level of Assist   Bathing Supervision   Bathing Description Grab bar;Hand held shower;Tub bench   Upper Body Dressing Modified Independent   Lower Body Dressing Supervision  (cues for safety dressing at the w/c level    cues to lock brakes  prior to standing)   Toileting Modified Independent   Toilet Transfers Supervised  (ues for w/c safety  making sure both brakes  are locked)   Tub / Shower Transfers Supervised  (ues for w/c safety  making sure both brakes  are locked)   Sitting Upper Body Exercises   Other Exercise bilateral shoulder flexion pushing  chyna box up  easel on setting 9  easel on high lo table  with table at highest setting .  cues to no compensate with shoulder abduciton   to push box up incline.   Fine Motor / Dexterity    Comments  seated at table worked on indvidual finger to thumb pinch  followed by in hand manipluation  picking up dice.   OT Total Time Spent   OT Individual Total Time Spent (Mins) 60   OT Charge Group   OT Self Care / ADL 2   OT Therapy Activity 2     Sated at table bilateral hand task  with PVC pipe pull apart  and put back together   Required to reach in various  planes using left UE  unable to reach for items with out  compensatory shoulder  abduction  noted.       Assessment    Mod cues to attend to left UE  to achieve desired results with  there ex performed this session    Continues with left UE  proximal weakness  compensates with   shoulder abduction during " functional reaching     Strengths: Supportive family,Willingly participates in therapeutic activities,Pleasant and cooperative,Motivated for self care and independence,Good endurance,Good balance,Effective communication skills,Able to follow instructions  Barriers: Decreased endurance,Hemiparesis,Limited mobility,Impaired balance    Plan  LUE neuro re-education, ADLs, balance, functional transfers/mobility   w/ tech 3X week     supine   Left shoulder strengthening to assist with decreasing compensatory abduction     Passport items to be completed:  Perform bathroom transfers, complete dressing, complete feeding, get ready for the day, prepare a simple meal, participate in household tasks, adapt home for safety needs, demonstrate home exercise program, complete caregiver training          Occupational Therapy Goals (Active)       Problem: OT Long Term Goals       Dates: Start: 02/25/22         Goal: LTG-By discharge, patient will complete basic self care tasks with mod I to supervision.        Dates: Start: 02/25/22            Goal: LTG-By discharge, patient will perform bathroom transfers with mod I to supervision.        Dates: Start: 02/25/22

## 2022-03-08 NOTE — PROGRESS NOTES
Rehab Progress Note     Encounter Date: 3/8/2022    CC: Decreased mobility, left sided weakness    Interval Events (Subjective)  Patient sitting up in therapy gym. He reports he thinks he needs a little more time. He reports he had a loss of balance and is concerned about being alone when his wife is working. Discussed would have IDT later today.  SBP improved today    IDT Team Meeting 3/8/2022    IOliverio M.D., was present and led the interdisciplinary team conference on 3/8/2022.  I led the IDT conference and agree with the IDT conference documentation and plan of care as noted below.     RN:  Diet Regular   % Meal     Pain    Sleep    Bowel Continent   Bladder Continent   In's & Out's      PT:  Bed Mobility    Transfers CGA   Mobility CGA   Stairs CGA       OT:  Eating    Grooming    Bathing    UB Dressing    LB Dressing Super vs   Toileting    Shower & Transfer supervs     SLP:  Signed    CM:  Continues to work on disposition and DME needs.      DC/Disposition:  3/9/22    Objective:  VITAL SIGNS: /79   Pulse 73   Temp 36.7 °C (98 °F) (Oral)   Resp 16   Ht 1.829 m (6')   Wt 73 kg (160 lb 15 oz)   SpO2 93%   BMI 21.83 kg/m²   Gen: NAD  Psych: Mood and affect appropriate  CV: RRR, no edema  Resp: CTAB, no upper airway sounds  Abd: NTND  Neuro: AOx4, 4/5 LUE  Unchanged from 3/7/22    No results found for this or any previous visit (from the past 72 hour(s)).    Current Facility-Administered Medications   Medication Frequency   • metoprolol SR (TOPROL XL) tablet 25 mg BID   • vitamin D3 (cholecalciferol) tablet 2,000 Units DAILY   • glipiZIDE (GLUCOTROL) tablet 2.5 mg BID AC   • metFORMIN (GLUCOPHAGE) tablet 500 mg BID WITH MEALS   • senna-docusate (PERICOLACE or SENOKOT S) 8.6-50 MG per tablet 2 Tablet BID W/MEALS PRN    And   • polyethylene glycol/lytes (MIRALAX) PACKET 1 Packet QDAY PRN    And   • magnesium hydroxide (MILK OF MAGNESIA) suspension 30 mL QDAY PRN    And   • bisacodyl  (DULCOLAX) suppository 10 mg QDAY PRN   • Respiratory Therapy Consult Continuous RT   • hydrALAZINE (APRESOLINE) tablet 25 mg Q8HRS PRN   • acetaminophen (Tylenol) tablet 650 mg Q4HRS PRN   • artificial tears ophthalmic solution 1 Drop PRN   • benzocaine-menthol (Cepacol) lozenge 1 Lozenge Q2HRS PRN   • mag hydrox-al hydrox-simeth (MAALOX PLUS ES or MYLANTA DS) suspension 20 mL Q2HRS PRN   • ondansetron (ZOFRAN ODT) dispertab 4 mg 4X/DAY PRN    Or   • ondansetron (ZOFRAN) syringe/vial injection 4 mg 4X/DAY PRN   • traZODone (DESYREL) tablet 50 mg QHS PRN   • sodium chloride (OCEAN) 0.65 % nasal spray 2 Spray PRN   • oxyCODONE immediate-release (ROXICODONE) tablet 5 mg Q3HRS PRN    Or   • oxyCODONE immediate release (ROXICODONE) tablet 10 mg Q3HRS PRN   • aspirin EC (ECOTRIN) tablet 81 mg DAILY   • atorvastatin (LIPITOR) tablet 80 mg DAILY   • clopidogrel (PLAVIX) tablet 75 mg DAILY   • gabapentin (NEURONTIN) capsule 300 mg Q EVENING   • promethazine (PHENERGAN) suppository 12.5-25 mg Q4HRS PRN   • promethazine (PHENERGAN) tablet 12.5-25 mg Q4HRS PRN       Orders Placed This Encounter   Procedures   • Diet Order Diet: Consistent CHO (Diabetic); Miscellaneous modifications: (optional): Vegetarian     Standing Status:   Standing     Number of Occurrences:   1     Order Specific Question:   Diet:     Answer:   Consistent CHO (Diabetic) [4]     Order Specific Question:   Miscellaneous modifications: (optional)     Answer:   Vegetarian [13]       Assessment:  Active Hospital Problems    Diagnosis    • *Right sided cerebral hemisphere cerebrovascular accident (CVA) (HCC)    • Below-knee amputation (HCC)    • CAD (coronary artery disease)    • HTN (hypertension)    • Vitamin D deficiency    • Uncontrolled type 2 diabetes mellitus with hyperglycemia (HCC)        Medical Decision Making and Plan:  R CVA - Patient with acute posterior internal capsule infarct with weakness UE > LE complicated by previous L BKA. Patient is  improving quickly  Addendum: IGC changed to left body (R Brain)   -PT and OT for mobility and ADLs  -SLP For cognitive screen. Cognitive deficits are mild, would like to focus on physical tasks.      HTN/CAD - Patient on ASA and Plavix. Patient on Metoprolol 12.5 mg BID. Into 150s, increase to 25 mg BID     DM2 with hyperglycemia - Patient on SSI and Metformin 500 mg BID on transfer.   -Hospitalist consulted. Metformin increased to 1000 mg BID. Into 250s, discussed with hospitlaist and Glipizide added 2.5 mg. Metformin reduced to 500 mg BID and Glipizide increased to BID      L BKA - Limited due to weakness and weight of prosthetic. Monitor for skin breakdown     HLD - Patient on Atorvastatin 80 mg QHS     Neuropathy - Patient on Gabapentin 300 mg QHS     Vitamin D deficiency - 12 on admission. Start on 4000 U -> reduce to 2000 U     GI Ppx - Patient on senna-docusate and PPI. No GI complaints. Discontinue     DVT Ppx - Patient on Lovenox on transfer. Ambulating > 125 feet, discontinue. Having loss of balances due to prosthetic and weakness    Total time:  36 minutes.  I spent greater than 50% of the time for patient care, counseling, and coordination on this date, including unit/floor time, and face-to-face time with the patient as per interval events and assessment and plan above. Topics discussed included discharge planning, extension possible, loss of balance and safety.     Oliverio Bai M.D.

## 2022-03-08 NOTE — CARE PLAN
The patient is Stable - Low risk of patient condition declining or worsening    Shift Goals  Clinical Goals: Safety  Patient Goals: Safety    Progress made toward(s) clinical / shift goals:    Problem: Fall Risk - Rehab  Goal: Patient will remain free from falls  Outcome: Progressing   Pt uses call light consistently and appropriately. Waits for assistance does not attempt self transfer this shift. Able to verbalize needs.

## 2022-03-08 NOTE — CARE PLAN
The patient is Stable - Low risk of patient condition declining or worsening    Shift Goals  Clinical Goals: Safety  Patient Goals: No falls    Progress made toward(s) clinical / shift goals:    Problem: Fall Risk - Rehab  Goal: Patient will remain free from falls  Outcome: Progressing   Pt uses call light consistently and appropriately. Waits for assistance does not attempt self transfer this shift. Able to verbalize needs.

## 2022-03-08 NOTE — THERAPY
Physical Therapy   Daily Treatment     Patient Name: Israel Aviles  Age:  56 y.o., Sex:  male  Medical Record #: [id number]  Today's Date: 3/7/2022     Precautions  Precautions: Fall Risk  Comments: L hemiparesis, occasional L inattention, L BKA    Subjective    Patient agreeable to session, declines stair training says he is comfortable with this.      Objective       03/07/22 1401   Gait Functional Level of Assist    Gait Level Of Assist Contact Guard Assist   Assistive Device Front Wheel Walker;Parallel Bars   Distance (Feet) 150   # of Times Distance was Traveled 1  (8 ft x 2 forward/backwards with RUE support in parallel bars.  No UE support forward 3 x 8ft)   Deviation Bradykinetic;Decreased Toe Off;Decreased Heel Strike   Transfer Functional Level of Assist   Bed, Chair, Wheelchair Transfer Contact Guard Assist   Bed Chair Wheelchair Transfer Description Adaptive equipment;Set-up of equipment;Verbal cueing;Supervision for safety   Standing Lower Body Exercises   Hip Extension 2 sets of 10;Left   Hip Abduction 2 sets of 10;Left   Marching 2 sets of 10  (left)   Comments cues for TKE and avoiding compensation througout standing ex. tends to use back to increase ROM.   Bed Mobility    Sit to Stand Contact Guard Assist   Interdisciplinary Plan of Care Collaboration   IDT Collaboration with  Occupational Therapist   Patient Position at End of Therapy Seated;Chair Alarm On;Self Releasing Lap Belt Applied;Call Light within Reach;Tray Table within Reach   Collaboration Comments CLOF with OT   PT Total Time Spent   PT Individual Total Time Spent (Mins) 30   PT Charge Group   PT Gait Training 1   PT Therapeutic Exercise 1   Educated on safety and fall risk to attempt gait without device at home.      Assessment    Pt shows understanding of PT recommendations to avoid gait without device. He does show L hip weakness and compensates through trunk for standing exercises, demos improved form with verbal and visual  cues from PT.     Strengths: Able to follow instructions,Alert and oriented,Effective communication skills,Good insight into deficits/needs,Independent prior level of function,Making steady progress towards goals,Manages pain appropriately,Motivated for self care and independence,Pleasant and cooperative,Supportive family,Willingly participates in therapeutic activities  Barriers: Decreased endurance,Generalized weakness,Impaired activity tolerance,Impaired balance,Limited mobility    Plan    Double check w/ CM Monday for insurance coverage for NMES unit. Education on unit if insurance will cover the cost.  LLE strengthening and neuro re-ed with focus on hip flexors and glutes, forced use as able on LLE and LUE (or inhibition of RUE use), gait with FWW, standing ther ex, stairs, bed mobility with decreased A, curb training w/ FWW, education for SO with hands on training.  D/C scheduled for 3/9/22.     Passport items to be completed:  Get in/out of bed safely, in/out of a vehicle, safely use mobility device, walk or wheel around home/community, navigate up and down stairs, show how to get up/down from the ground, ensure home is accessible, demonstrate HEP, complete caregiver training       Physical Therapy Problems (Active)       Problem: Mobility       Dates: Start: 02/25/22         Goal: STG-Within one week, patient will ambulate 150 feet with standard FWW, gait belt, L BKA prosthetic, and CGA.       Dates: Start: 02/25/22            Goal: STG-Within one week, patient will ascend and descend four to six stairs with B railings, gait belt, L BKA prosthetic, and Min A.       Dates: Start: 02/25/22               Problem: Mobility Transfers       Dates: Start: 02/25/22         Goal: STG-Within one week, patient will perform bed mobility with bed rail, cueing, and SBA.       Dates: Start: 02/25/22               Problem: PT-Long Term Goals       Dates: Start: 02/25/22         Goal: LTG-By discharge, patient will ambulate  "300 feet with SPC and L BKA prosthetic at SPV level.       Dates: Start: 02/25/22            Goal: LTG-By discharge, patient will transfer one surface to another with SPC and L BKA prosthetic at IND level.       Dates: Start: 02/25/22            Goal: LTG-By discharge, patient will perform home exercise program with handout and L BKA prosthetic at IND level.       Dates: Start: 02/25/22            Goal: LTG-By discharge, patient will transfer in/out of a car with SPC and L BKA prosthetic at SPV level.       Dates: Start: 02/25/22            Goal: LTG-By discharge, patient will ascend/descend a 4\" curb with SPC and L BKA prosthetic at SPV level.       Dates: Start: 02/25/22              "

## 2022-03-09 PROCEDURE — 700102 HCHG RX REV CODE 250 W/ 637 OVERRIDE(OP): Performed by: PHYSICAL MEDICINE & REHABILITATION

## 2022-03-09 PROCEDURE — 97535 SELF CARE MNGMENT TRAINING: CPT

## 2022-03-09 PROCEDURE — 770010 HCHG ROOM/CARE - REHAB SEMI PRIVAT*

## 2022-03-09 PROCEDURE — 97116 GAIT TRAINING THERAPY: CPT

## 2022-03-09 PROCEDURE — 97530 THERAPEUTIC ACTIVITIES: CPT

## 2022-03-09 PROCEDURE — 700102 HCHG RX REV CODE 250 W/ 637 OVERRIDE(OP): Performed by: HOSPITALIST

## 2022-03-09 PROCEDURE — A9270 NON-COVERED ITEM OR SERVICE: HCPCS | Performed by: HOSPITALIST

## 2022-03-09 PROCEDURE — 97110 THERAPEUTIC EXERCISES: CPT

## 2022-03-09 PROCEDURE — 99232 SBSQ HOSP IP/OBS MODERATE 35: CPT | Performed by: PHYSICAL MEDICINE & REHABILITATION

## 2022-03-09 PROCEDURE — A9270 NON-COVERED ITEM OR SERVICE: HCPCS | Performed by: PHYSICAL MEDICINE & REHABILITATION

## 2022-03-09 RX ORDER — METOPROLOL SUCCINATE 25 MG/1
37.5 TABLET, EXTENDED RELEASE ORAL 2 TIMES DAILY
Status: DISCONTINUED | OUTPATIENT
Start: 2022-03-09 | End: 2022-03-11

## 2022-03-09 RX ADMIN — ATORVASTATIN CALCIUM 80 MG: 40 TABLET, FILM COATED ORAL at 08:08

## 2022-03-09 RX ADMIN — METOPROLOL SUCCINATE 25 MG: 25 TABLET, EXTENDED RELEASE ORAL at 08:08

## 2022-03-09 RX ADMIN — Medication 2000 UNITS: at 08:08

## 2022-03-09 RX ADMIN — CLOPIDOGREL BISULFATE 75 MG: 75 TABLET ORAL at 08:08

## 2022-03-09 RX ADMIN — METFORMIN HYDROCHLORIDE 500 MG: 500 TABLET ORAL at 17:37

## 2022-03-09 RX ADMIN — METFORMIN HYDROCHLORIDE 500 MG: 500 TABLET ORAL at 08:08

## 2022-03-09 RX ADMIN — GLIPIZIDE 2.5 MG: 5 TABLET ORAL at 17:36

## 2022-03-09 RX ADMIN — GABAPENTIN 300 MG: 300 CAPSULE ORAL at 21:00

## 2022-03-09 RX ADMIN — METOPROLOL SUCCINATE 12.5 MG: 25 TABLET, EXTENDED RELEASE ORAL at 21:03

## 2022-03-09 RX ADMIN — GLIPIZIDE 2.5 MG: 5 TABLET ORAL at 08:08

## 2022-03-09 RX ADMIN — ASPIRIN 81 MG: 81 TABLET, COATED ORAL at 08:09

## 2022-03-09 ASSESSMENT — GAIT ASSESSMENTS
ASSISTIVE DEVICE: FRONT WHEEL WALKER
DISTANCE (FEET): 150
GAIT LEVEL OF ASSIST: STANDBY ASSIST
DEVIATION: BRADYKINETIC;DECREASED HEEL STRIKE;DECREASED TOE OFF
ASSISTIVE DEVICE: FRONT WHEEL WALKER
GAIT LEVEL OF ASSIST: STANDBY ASSIST
DISTANCE (FEET): 350
DEVIATION: BRADYKINETIC;DECREASED HEEL STRIKE;DECREASED TOE OFF

## 2022-03-09 ASSESSMENT — ACTIVITIES OF DAILY LIVING (ADL): BED_CHAIR_WHEELCHAIR_TRANSFER_DESCRIPTION: INCREASED TIME;SUPERVISION FOR SAFETY

## 2022-03-09 NOTE — DISCHARGE PLANNING
CM  Insurance has authorized pt's stay to 3/10.  Discussed w/ MD - dc set for 3/12.   Updated Zuleyma PERALTA, Katja PRAKASH, and Tammi MARTINEZ    Tc to spouse Augustina @ [phone]  informing of new dc date.

## 2022-03-09 NOTE — THERAPY
Occupational Therapy  Daily Treatment     Patient Name: Israel Aviles  Age:  56 y.o., Sex:  male  Medical Record #: [id number]  Today's Date: 3/9/2022     Precautions  Precautions: (P) Fall Risk  Comments: (P) L hemiparesis, occasional L inattention, L BKA         Subjective    Pt seen at 8:30-9:30 and 10:30-11:00     Objective       03/09/22 0831   Precautions   Precautions Fall Risk   Comments L hemiparesis, occasional L inattention, L BKA   Functional Level of Assist   Lower Body Dressing Supervision   Lower Body Dressing Description Set-up of equipment   Bed, Chair, Wheelchair Transfer Supervised   Bed Chair Wheelchair Transfer Description Increased time;Supervision for safety  (lateral scoot standard bed<>w/c)   Sitting Upper Body Exercises   Upper Extremity Bike Level 6 Resistance  (10 min, 1 min)   Fine Motor / Dexterity    Fine Motor / Dexterity Yes   Fine Motor / Dexterity Interventions Fine motor home program given and explained   Comments  Pt was given FM home program and items to attempt exercises at the end of the session. Pt was educated on how to complete exercises and purpose of home exercise program.   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 Pt requried CGA-min A for balance while stocking w/ BUEs and transfering objects between hands. Pt had 1 LOB while moving item from LUE to RUE and was able to regain balance with min A.   Sitting Lower Body Exercises   Other Exercises LB bike  (Level 4, motomed, 10 min, 1 RB)   Interdisciplinary Plan of Care Collaboration   IDT Collaboration with  Physical Therapist;Family / Caregiver   Patient Position at End of Therapy Seated;Other (Comments);Family / Friend in Room  (in gym waiting for PT)   Collaboration Comments CLOF;family training   OT Total Time Spent   OT Individual Total Time Spent (Mins) 90   OT  Charge Group   OT Self Care / ADL 2   OT Therapy Activity 2   OT Therapeutic Exercise  2   8:30-9:30 session focused on endurance, strengthening, work simulation, and FM home exercise program. Pt requested to complete exercises for L and UB exercises. Pt demonstrated work simulation activity where he used BUEs to stock shelves.     10:30-11:00 Session focused on family training with wife. Pt and wife participated in problem solving bathroom and bed transfers. Pt and pt's wife report they have all bathroom and bedroom equipment they need. Pt will be home alone during the day and needs to be mod I for bathroom transfers and w/c mobility inside the house.     Assessment    Pt tolerated sessions well focused on activities stated above. Pt cont to be very motivated to increase function of LUE and independence before d/c home.   Strengths: Supportive family,Willingly participates in therapeutic activities,Pleasant and cooperative,Motivated for self care and independence,Good endurance,Good balance,Effective communication skills,Able to follow instructions  Barriers: Decreased endurance,Hemiparesis,Limited mobility,Impaired balance    Plan    LUE neuro re-education, ADLs, balance, functional transfers/mobility   w/ tech 3X week     supine   Left shoulder strengthening to assist with decreasing compensatory abduction      Passport items to be completed:  Perform bathroom transfers, complete dressing, complete feeding, get ready for the day, prepare a simple meal, participate in household tasks, adapt home for safety needs, demonstrate home exercise program, complete caregiver training        Occupational Therapy Goals (Active)       Problem: OT Long Term Goals       Dates: Start: 02/25/22         Goal: LTG-By discharge, patient will complete basic self care tasks with mod I to supervision.        Dates: Start: 02/25/22            Goal: LTG-By discharge, patient will perform bathroom transfers with mod I to  supervision.        Dates: Start: 02/25/22

## 2022-03-09 NOTE — PROGRESS NOTES
Rehab Progress Note     Encounter Date: 3/9/2022    CC: Decreased mobility, left sided weakness    Interval Events (Subjective)  Patient sitting up in room. He reports he is doing well. His wife is to come in today for family training. She will be available to pick him up on the 12th. He reports he will look into buying e stim.  Denies pain. Denies NVD.     IDT Team Meeting 3/8/2022  DC/Disposition:  3/12/22    Objective:  VITAL SIGNS: /80   Pulse 78   Temp 36.6 °C (97.9 °F) (Oral)   Resp 16   Ht 1.829 m (6')   Wt 73 kg (160 lb 15 oz)   SpO2 93%   BMI 21.83 kg/m²   Gen: NAD  Psych: Mood and affect appropriate  CV: RRR, no edema  Resp: CTAB, no upper airway sounds  Abd: NTND  Neuro: AOx4, pushing wheelchair BUE    No results found for this or any previous visit (from the past 72 hour(s)).    Current Facility-Administered Medications   Medication Frequency   • metoprolol SR (TOPROL XL) tablet 25 mg BID   • vitamin D3 (cholecalciferol) tablet 2,000 Units DAILY   • glipiZIDE (GLUCOTROL) tablet 2.5 mg BID AC   • metFORMIN (GLUCOPHAGE) tablet 500 mg BID WITH MEALS   • senna-docusate (PERICOLACE or SENOKOT S) 8.6-50 MG per tablet 2 Tablet BID W/MEALS PRN    And   • polyethylene glycol/lytes (MIRALAX) PACKET 1 Packet QDAY PRN    And   • magnesium hydroxide (MILK OF MAGNESIA) suspension 30 mL QDAY PRN    And   • bisacodyl (DULCOLAX) suppository 10 mg QDAY PRN   • Respiratory Therapy Consult Continuous RT   • hydrALAZINE (APRESOLINE) tablet 25 mg Q8HRS PRN   • acetaminophen (Tylenol) tablet 650 mg Q4HRS PRN   • artificial tears ophthalmic solution 1 Drop PRN   • benzocaine-menthol (Cepacol) lozenge 1 Lozenge Q2HRS PRN   • mag hydrox-al hydrox-simeth (MAALOX PLUS ES or MYLANTA DS) suspension 20 mL Q2HRS PRN   • ondansetron (ZOFRAN ODT) dispertab 4 mg 4X/DAY PRN    Or   • ondansetron (ZOFRAN) syringe/vial injection 4 mg 4X/DAY PRN   • traZODone (DESYREL) tablet 50 mg QHS PRN   • sodium chloride (OCEAN) 0.65 % nasal  spray 2 Spray PRN   • oxyCODONE immediate-release (ROXICODONE) tablet 5 mg Q3HRS PRN    Or   • oxyCODONE immediate release (ROXICODONE) tablet 10 mg Q3HRS PRN   • aspirin EC (ECOTRIN) tablet 81 mg DAILY   • atorvastatin (LIPITOR) tablet 80 mg DAILY   • clopidogrel (PLAVIX) tablet 75 mg DAILY   • gabapentin (NEURONTIN) capsule 300 mg Q EVENING   • promethazine (PHENERGAN) suppository 12.5-25 mg Q4HRS PRN   • promethazine (PHENERGAN) tablet 12.5-25 mg Q4HRS PRN       Orders Placed This Encounter   Procedures   • Diet Order Diet: Consistent CHO (Diabetic); Miscellaneous modifications: (optional): Vegetarian     Standing Status:   Standing     Number of Occurrences:   1     Order Specific Question:   Diet:     Answer:   Consistent CHO (Diabetic) [4]     Order Specific Question:   Miscellaneous modifications: (optional)     Answer:   Vegetarian [13]       Assessment:  Active Hospital Problems    Diagnosis    • *Right sided cerebral hemisphere cerebrovascular accident (CVA) (HCC)    • Below-knee amputation (HCC)    • CAD (coronary artery disease)    • HTN (hypertension)    • Vitamin D deficiency    • Uncontrolled type 2 diabetes mellitus with hyperglycemia (HCC)        Medical Decision Making and Plan:  R CVA - Patient with acute posterior internal capsule infarct with weakness UE > LE complicated by previous L BKA. Patient is improving quickly  Addendum: IGC changed to left body (R Brain)   -PT and OT for mobility and ADLs  -SLP For cognitive screen. Cognitive deficits are mild, would like to focus on physical tasks.      HTN/CAD - Patient on ASA and Plavix. Patient on Metoprolol 12.5 mg BID. Into 150s, increase to 25 mg BID. Still into 150s, increase to 37.5     DM2 with hyperglycemia - Patient on SSI and Metformin 500 mg BID on transfer.   -Hospitalist consulted. Metformin increased to 1000 mg BID. Into 250s, discussed with hospitlaist and Glipizide added 2.5 mg. Metformin reduced to 500 mg BID and Glipizide increased  to BID      L BKA - Limited due to weakness and weight of prosthetic. Monitor for skin breakdown     HLD - Patient on Atorvastatin 80 mg QHS     Neuropathy - Patient on Gabapentin 300 mg QHS     Vitamin D deficiency - 12 on admission. Start on 4000 U -> reduce to 2000 U     GI Ppx - Patient on senna-docusate and PPI. No GI complaints. Discontinue     DVT Ppx - Patient on Lovenox on transfer. Ambulating > 125 feet, discontinue. Having loss of balances due to prosthetic and weakness    Total time:  26 minutes.  I spent greater than 50% of the time for patient care, counseling, and coordination on this date, including unit/floor time, and face-to-face time with the patient as per interval events and assessment and plan above. Topics discussed included discharge extension, elevated SBP into 150s, and increase BB.     Oliverio Bai M.D.

## 2022-03-09 NOTE — THERAPY
Physical Therapy   Daily Treatment     Patient Name: Israel Aviles  Age:  56 y.o., Sex:  male  Medical Record #: [id number]  Today's Date: 3/9/2022     Precautions  Precautions: Fall Risk  Comments: L hemiparesis, occasional L inattention, L BKA    Subjective    Patient agreeable to PT; SO present for hands on family training.     Objective       03/09/22 1101   Vitals   O2 (LPM) 0   O2 Delivery Device None - Room Air   Gait Functional Level of Assist    Gait Level Of Assist Standby Assist  (CGA for one instance only)   Assistive Device Front Wheel Walker  (and gait belt and L BKA prosthesis)   Distance (Feet) 350  (indoors)   # of Times Distance was Traveled 1   Deviation Bradykinetic;Decreased Heel Strike;Decreased Toe Off   Transfer Functional Level of Assist   Bed, Chair, Wheelchair Transfer Standby Assist   Bed Chair Wheelchair Transfer Description Adaptive equipment;Increased time;Non-hospital bed;Supervision for safety   Bed Mobility    Supine to Sit Modified Independent   Sit to Supine Supervised   Sit to Stand Standby Assist  (FWW, gait belt, L BKA prosthesis)   Scooting Modified Independent   Rolling Modified Independent   Interdisciplinary Plan of Care Collaboration   IDT Collaboration with  Family / Caregiver;Occupational Therapist   Patient Position at End of Therapy Seated;Self Releasing Lap Belt Applied;Family / Friend in Room;Other (Comments)  (in gym after OT session)   Collaboration Comments SO present for hands on training including ambulation and mat transfer; discussed POC, CLOF, pt's goals, therapy goals, and f/u opportunities.  Pt and SO decline car transfers this session but verbalize understanding.  Also discussed DME and pt's w/c may be in need of repair for the sling seat and w/c cushion; pt's SO to bring in w/c and FWW.   PT Total Time Spent   PT Individual Total Time Spent (Mins) 30   PT Charge Group   PT Gait Training 1   PT Therapeutic Activities 1       Assessment    Patient  "demonstrates improving activity tolerance, no SOB, no LOB, improving management of FWW including with L UE/hand usage, and good social support.  Pt and SO motivated to have patient return to PLOF and use of gym for additional exercise.  Good understanding of discussion and hands on training. Will require f/u regarding current quality of existing DME.    Strengths: Able to follow instructions,Alert and oriented,Effective communication skills,Good insight into deficits/needs,Independent prior level of function,Making steady progress towards goals,Manages pain appropriately,Motivated for self care and independence,Pleasant and cooperative,Supportive family,Willingly participates in therapeutic activities  Barriers: Decreased endurance,Generalized weakness,Impaired activity tolerance,Impaired balance,Limited mobility    Plan    Assess quality of pt's DME, complete patient passport, work on decreased A with [id number]-[id number] items.  Pt scheduled to d/c on Saturday 3/12/22.    Passport items to be completed:  show how to get up/down from the ground, complete caregiver training    Physical Therapy Problems (Active)       Problem: PT-Long Term Goals       Dates: Start: 02/25/22         Goal: LTG-By discharge, patient will ambulate 300 feet with SPC and L BKA prosthetic at SPV level.       Dates: Start: 02/25/22            Goal: LTG-By discharge, patient will transfer one surface to another with SPC and L BKA prosthetic at IND level.       Dates: Start: 02/25/22            Goal: LTG-By discharge, patient will perform home exercise program with handout and L BKA prosthetic at IND level.       Dates: Start: 02/25/22            Goal: LTG-By discharge, patient will transfer in/out of a car with SPC and L BKA prosthetic at SPV level.       Dates: Start: 02/25/22            Goal: LTG-By discharge, patient will ascend/descend a 4\" curb with SPC and L BKA prosthetic at SPV level.       Dates: Start: 02/25/22              "

## 2022-03-10 PROCEDURE — A9270 NON-COVERED ITEM OR SERVICE: HCPCS | Performed by: PHYSICAL MEDICINE & REHABILITATION

## 2022-03-10 PROCEDURE — 97110 THERAPEUTIC EXERCISES: CPT | Mod: CO

## 2022-03-10 PROCEDURE — 97530 THERAPEUTIC ACTIVITIES: CPT | Mod: CQ

## 2022-03-10 PROCEDURE — 97112 NEUROMUSCULAR REEDUCATION: CPT | Mod: CO

## 2022-03-10 PROCEDURE — A9270 NON-COVERED ITEM OR SERVICE: HCPCS | Performed by: HOSPITALIST

## 2022-03-10 PROCEDURE — 99232 SBSQ HOSP IP/OBS MODERATE 35: CPT | Performed by: PHYSICAL MEDICINE & REHABILITATION

## 2022-03-10 PROCEDURE — 770010 HCHG ROOM/CARE - REHAB SEMI PRIVAT*

## 2022-03-10 PROCEDURE — 97530 THERAPEUTIC ACTIVITIES: CPT

## 2022-03-10 PROCEDURE — 97116 GAIT TRAINING THERAPY: CPT | Mod: CQ

## 2022-03-10 PROCEDURE — 700102 HCHG RX REV CODE 250 W/ 637 OVERRIDE(OP): Performed by: PHYSICAL MEDICINE & REHABILITATION

## 2022-03-10 PROCEDURE — 700102 HCHG RX REV CODE 250 W/ 637 OVERRIDE(OP): Performed by: HOSPITALIST

## 2022-03-10 RX ADMIN — ASPIRIN 81 MG: 81 TABLET, COATED ORAL at 08:17

## 2022-03-10 RX ADMIN — GLIPIZIDE 2.5 MG: 5 TABLET ORAL at 17:15

## 2022-03-10 RX ADMIN — METOPROLOL SUCCINATE 37.5 MG: 25 TABLET, EXTENDED RELEASE ORAL at 08:17

## 2022-03-10 RX ADMIN — GABAPENTIN 300 MG: 300 CAPSULE ORAL at 21:00

## 2022-03-10 RX ADMIN — ATORVASTATIN CALCIUM 80 MG: 40 TABLET, FILM COATED ORAL at 08:17

## 2022-03-10 RX ADMIN — METFORMIN HYDROCHLORIDE 500 MG: 500 TABLET ORAL at 08:17

## 2022-03-10 RX ADMIN — METFORMIN HYDROCHLORIDE 500 MG: 500 TABLET ORAL at 17:15

## 2022-03-10 RX ADMIN — Medication 2000 UNITS: at 08:17

## 2022-03-10 RX ADMIN — GLIPIZIDE 2.5 MG: 5 TABLET ORAL at 08:17

## 2022-03-10 RX ADMIN — CLOPIDOGREL BISULFATE 75 MG: 75 TABLET ORAL at 08:17

## 2022-03-10 RX ADMIN — METOPROLOL SUCCINATE 37.5 MG: 25 TABLET, EXTENDED RELEASE ORAL at 21:23

## 2022-03-10 ASSESSMENT — GAIT ASSESSMENTS
GAIT LEVEL OF ASSIST: STANDBY ASSIST
ASSISTIVE DEVICE: FRONT WHEEL WALKER
DEVIATION: BRADYKINETIC;DECREASED HEEL STRIKE;DECREASED TOE OFF

## 2022-03-10 ASSESSMENT — ACTIVITIES OF DAILY LIVING (ADL)
BED_CHAIR_WHEELCHAIR_TRANSFER_DESCRIPTION: ADAPTIVE EQUIPMENT;INCREASED TIME;SET-UP OF EQUIPMENT;SUPERVISION FOR SAFETY;VERBAL CUEING
BED_CHAIR_WHEELCHAIR_TRANSFER_DESCRIPTION: SQUAT PIVOT TRANSFER TO WHEELCHAIR
BED_CHAIR_WHEELCHAIR_TRANSFER_DESCRIPTION: ADAPTIVE EQUIPMENT;INCREASED TIME;SET-UP OF EQUIPMENT;SUPERVISION FOR SAFETY;VERBAL CUEING

## 2022-03-10 NOTE — THERAPY
Physical Therapy   Daily Treatment     Patient Name: Israel Aviles  Age:  56 y.o., Sex:  male  Medical Record #: [id number]  Today's Date: 3/10/2022     Precautions  Precautions: Fall Risk  Comments: L hemiparesis, occasional L inattention, L BKA    Subjective    Pt in bed upon arrival, agreeable to session.     Objective       03/10/22 0831   Pain 0 - 10 Group   Pain Rating Scale (NPRS) 0   Cognition    Level of Consciousness Alert   Wheelchair Functional Level of Assist   Wheelchair Assist Stand by Assist   Distance Wheelchair (Feet or Distance) 80   Wheelchair Description Extra time;Limited by fatigue;Supervision for safety;Verbal cueing   Transfer Functional Level of Assist   Bed, Chair, Wheelchair Transfer Standby Assist   Bed Chair Wheelchair Transfer Description Adaptive equipment;Increased time;Set-up of equipment;Supervision for safety;Verbal cueing  (SPT w/ FWW)   Bed Mobility    Supine to Sit Modified Independent   Sit to Stand Standby Assist   Scooting Modified Independent   Rolling Modified Independent   Interdisciplinary Plan of Care Collaboration   Patient Position at End of Therapy Seated;Call Light within Reach;Tray Table within Reach;Phone within Reach   PT Total Time Spent   PT Individual Total Time Spent (Mins) 30   PT Charge Group   PT Therapeutic Activities 2   Supervising Physical Therapist Rodriguez Victoria     Completed floor xfer w/ gait belt and cues and Hari.  Completed passport signing.    Assessment    Pt tolerated session well and demonstrated good motivation. C/o L LE weakness during floor xfer but was able to complete w/ Hari. Will assess pt's w/c in the afternoon session and outdoor amb    Strengths: Able to follow instructions,Alert and oriented,Effective communication skills,Good insight into deficits/needs,Independent prior level of function,Making steady progress towards goals,Manages pain appropriately,Motivated for self care and independence,Pleasant and cooperative,Supportive  "family,Willingly participates in therapeutic activities  Barriers: Decreased endurance,Generalized weakness,Impaired activity tolerance,Impaired balance,Limited mobility    Plan    Assess quality of pt's DME, work on decreased A with IRF-JOSSUE items.  Pt scheduled to d/c on Saturday 3/12/22.    Passport items to be completed:  Completed    Physical Therapy Problems (Active)       Problem: PT-Long Term Goals       Dates: Start: 02/25/22         Goal: LTG-By discharge, patient will ambulate 300 feet with SPC and L BKA prosthetic at SPV level.       Dates: Start: 02/25/22            Goal: LTG-By discharge, patient will transfer one surface to another with SPC and L BKA prosthetic at IND level.       Dates: Start: 02/25/22            Goal: LTG-By discharge, patient will perform home exercise program with handout and L BKA prosthetic at IND level.       Dates: Start: 02/25/22            Goal: LTG-By discharge, patient will transfer in/out of a car with SPC and L BKA prosthetic at SPV level.       Dates: Start: 02/25/22            Goal: LTG-By discharge, patient will ascend/descend a 4\" curb with SPC and L BKA prosthetic at SPV level.       Dates: Start: 02/25/22              "

## 2022-03-10 NOTE — THERAPY
Occupational Therapy  Daily Treatment     Patient Name: Israel Aviles  Age:  56 y.o., Sex:  male  Medical Record #: [id number]  Today's Date: 3/10/2022     Precautions  Precautions: (P) Fall Risk  Comments: (P) L hemiparesis, occasional L inattention, L BKA         Subjective    Pt reports muscle soreness from yesterday's therapy workouts     Objective       03/10/22 0931   Precautions   Precautions Fall Risk   Comments L hemiparesis, occasional L inattention, L BKA   Fine Motor / Dexterity    Fine Motor / Dexterity Yes   Fine Motor / Dexterity Interventions small peg board, 1 in blocks   Comments  See note for details   Interdisciplinary Plan of Care Collaboration   Patient Position at End of Therapy Seated;Call Light within Reach;Phone within Reach;Tray Table within Reach   OT Total Time Spent   OT Individual Total Time Spent (Mins) 30   OT Charge Group   OT Therapy Activity 2   Small peg board  Pt collected correct colored pegs from container and placed them in small peg board with verbal cues and increased time. Pt did not require any physical assistance or set up for activity. Pt placed 12 pegs in correctly in 12 min and 20 sec.     1 in blocks  Pt built 3 block structures based off pictures. Pt required increased time to complete each structure. Pt then stacked as many block as he could without knocking them over x3; 4 block, 4 blocks, 7 blocks.     Assessment    Pt tolerated session well focused on FM activities. Pt demonstrated increased FM coordination during activities. Previously pt required assistance to steady hand and set up of each peg/block to complete activities. Pt adapted his grasp and strategies when given verbal and visual cues to increase FM control.   Strengths: Supportive family,Willingly participates in therapeutic activities,Pleasant and cooperative,Motivated for self care and independence,Good endurance,Good balance,Effective communication skills,Able to follow instructions  Barriers:  Decreased endurance,Hemiparesis,Limited mobility,Impaired balance    Plan    LUE neuro re-education, ADLs, balance, functional transfers/mobility   w/ tech 3X week     supine   Left shoulder strengthening to assist with decreasing compensatory abduction      Passport items to be completed:  Perform bathroom transfers, complete dressing, complete feeding, get ready for the day, prepare a simple meal, participate in household tasks, adapt home for safety needs, demonstrate home exercise program, complete caregiver training     Occupational Therapy Goals (Active)       Problem: OT Long Term Goals       Dates: Start: 02/25/22         Goal: LTG-By discharge, patient will complete basic self care tasks with mod I to supervision.        Dates: Start: 02/25/22            Goal: LTG-By discharge, patient will perform bathroom transfers with mod I to supervision.        Dates: Start: 02/25/22

## 2022-03-10 NOTE — THERAPY
Physical Therapy   Daily Treatment     Patient Name: Israel Aviles  Age:  56 y.o., Sex:  male  Medical Record #: [id number]  Today's Date: 3/9/2022     Precautions  Precautions: Fall Risk  Comments: L hemiparesis, occasional L inattention, L BKA    Subjective    Pt was seated in w/c upon arrival and agreeable to treatment.        Objective       03/09/22 1401   Precautions   Precautions Fall Risk   Gait Functional Level of Assist    Gait Level Of Assist Standby Assist   Assistive Device Front Wheel Walker   Distance (Feet) 150   # of Times Distance was Traveled 1   Deviation Bradykinetic;Decreased Heel Strike;Decreased Toe Off   Wheelchair Functional Level of Assist   Wheelchair Assist Stand by Assist   Distance Wheelchair (Feet or Distance) 50   Wheelchair Description Extra time;Supervision for safety;Verbal cueing   Interdisciplinary Plan of Care Collaboration   Patient Position at End of Therapy Seated;Call Light within Reach;Tray Table within Reach;Phone within Reach   PT Total Time Spent   PT Individual Total Time Spent (Mins) 60   PT Charge Group   PT Gait Training 1   PT Therapeutic Exercise 3     Shuttle: 3 cords SL 3 x 10 LLE, 6 cords BLE 3 x 10, 6 cords BLE frog position x 10 reps.  UE exercise: rows x 20 #, lat pull down 20 #, chest press 15 # all 3 x 10, pt given UE HEP using blue TBand on footboard.     Assessment    Pt demonstrated good ability and activity tolerance with all activities.  Pt is motivated to progress.   Strengths: Able to follow instructions,Alert and oriented,Effective communication skills,Good insight into deficits/needs,Independent prior level of function,Making steady progress towards goals,Manages pain appropriately,Motivated for self care and independence,Pleasant and cooperative,Supportive family,Willingly participates in therapeutic activities  Barriers: Decreased endurance,Generalized weakness,Impaired activity tolerance,Impaired balance,Limited mobility    Plan    Assess  "quality of pt's DME, complete patient passport, work on decreased A with IRF-JOSSUE items.  Pt scheduled to d/c on Saturday 3/12/22.     Passport items to be completed:  show how to get up/down from the ground, complete caregiver training       Physical Therapy Problems (Active)       Problem: PT-Long Term Goals       Dates: Start: 02/25/22         Goal: LTG-By discharge, patient will ambulate 300 feet with SPC and L BKA prosthetic at SPV level.       Dates: Start: 02/25/22            Goal: LTG-By discharge, patient will transfer one surface to another with SPC and L BKA prosthetic at IND level.       Dates: Start: 02/25/22            Goal: LTG-By discharge, patient will perform home exercise program with handout and L BKA prosthetic at IND level.       Dates: Start: 02/25/22            Goal: LTG-By discharge, patient will transfer in/out of a car with SPC and L BKA prosthetic at SPV level.       Dates: Start: 02/25/22            Goal: LTG-By discharge, patient will ascend/descend a 4\" curb with SPC and L BKA prosthetic at SPV level.       Dates: Start: 02/25/22              "

## 2022-03-10 NOTE — THERAPY
Physical Therapy   Daily Treatment     Patient Name: Israel Aviles  Age:  56 y.o., Sex:  male  Medical Record #: [id number]  Today's Date: 3/10/2022     Precautions  Precautions: Fall Risk  Comments: L hemiparesis, occasional L inattention, L BKA    Subjective    Pt in room w/ wife presented upon arrival, agreeable to session.     Objective       03/10/22 1301   Pain 0 - 10 Group   Pain Rating Scale (NPRS) 0   Cognition    Level of Consciousness Alert   Gait Functional Level of Assist    Gait Level Of Assist Standby Assist   Assistive Device Front Wheel Walker   Distance (Feet)   (100x2 indoor w/ SBA, 60x2 outdoor w/ CGA)   Deviation Bradykinetic;Decreased Heel Strike;Decreased Toe Off  (cues for continious steps, LOBs x2 indoor when amb w/ wife)   Transfer Functional Level of Assist   Bed, Chair, Wheelchair Transfer Standby Assist   Bed Chair Wheelchair Transfer Description Adaptive equipment;Increased time;Set-up of equipment;Supervision for safety;Verbal cueing  (SPT w/ FWW)   Bed Mobility    Sit to Stand Standby Assist   Interdisciplinary Plan of Care Collaboration   IDT Collaboration with  Family / Caregiver   Patient Position at End of Therapy In Bed;Call Light within Reach;Tray Table within Reach;Phone within Reach;Family / Friend in Room   Collaboration Comments wife presented throughout session   PT Total Time Spent   PT Individual Total Time Spent (Mins) 30   PT Charge Group   PT Gait Training 2   Supervising Physical Therapist Rodriguez Victoria     Assessed pt's personal w/c, sent photos to CM for ordering cushion and arm rests replacement.  Education to wife on staying on pt's L side during amb/curb at home.     Assessment    Pt demonstrated good amb endurance w/ his personal FWW, however when pt's wife ambulating w/ pt w/ CGA on gait belt, pt showing decreased L foot clearance and LOB x2, wife was able to assist for recovery. Pt demonstrated step to pattern w/o cues but was able to amb in continuous  "steps after education(CGA outdoor, SBA indoor).     Strengths: Able to follow instructions,Alert and oriented,Effective communication skills,Good insight into deficits/needs,Independent prior level of function,Making steady progress towards goals,Manages pain appropriately,Motivated for self care and independence,Pleasant and cooperative,Supportive family,Willingly participates in therapeutic activities  Barriers: Decreased endurance,Generalized weakness,Impaired activity tolerance,Impaired balance,Limited mobility    Plan    Work on decreased A with IRF-JOSSUE items.  Pt scheduled to d/c on Saturday 3/12/22.     Passport items to be completed:  Completed    Physical Therapy Problems (Active)       Problem: PT-Long Term Goals       Dates: Start: 02/25/22         Goal: LTG-By discharge, patient will ambulate 300 feet with SPC and L BKA prosthetic at SPV level.       Dates: Start: 02/25/22            Goal: LTG-By discharge, patient will transfer one surface to another with SPC and L BKA prosthetic at IND level.       Dates: Start: 02/25/22            Goal: LTG-By discharge, patient will perform home exercise program with handout and L BKA prosthetic at IND level.       Dates: Start: 02/25/22            Goal: LTG-By discharge, patient will transfer in/out of a car with SPC and L BKA prosthetic at SPV level.       Dates: Start: 02/25/22            Goal: LTG-By discharge, patient will ascend/descend a 4\" curb with SPC and L BKA prosthetic at SPV level.       Dates: Start: 02/25/22              "

## 2022-03-10 NOTE — CARE PLAN
The patient is Stable - Low risk of patient condition declining or worsening    Shift Goals  Clinical Goals: Safety  Patient Goals: Safety    Progress made toward(s) clinical / shift goals:    Problem: Skin Integrity  Goal: Patient's skin integrity will be maintained or improve  Note: Patient's skin remains intact and free from new or accidental injury this shift.  Will continue to monitor.      Problem: Diabetes Management  Goal: Patient's ability to maintain appropriate glucose levels will be maintained or improve  Outcome: Progressing  Note: No S/S hypo/hyperglycemia noted. Pt on Metformin 500 mg twice daily.     Patient refused to take Metoprolol 37.5 mg PO. He took 12.5 mg only, patient states he doesn't need the higher dose of Metoprolol. Reviewed Blood pressure trends with patient that they were on the high side, patient states, I only want to take 12.5 mg.   Charge RN aware.

## 2022-03-10 NOTE — CARE PLAN
"      Problem: Fall Risk - Rehab  Goal: Patient will remain free from falls  Outcome: Progressing  Note: Mendy Willingham Fall risk Assessment Score: 12     Moderate fall risk Interventions  - Bed and strip alarm   - Yellow sign by the door   - Yellow wrist band \"Fall risk\"  - Room near to the nurse station  - Do not leave patient unattended in the bathroom  - Fall risk education provided       "

## 2022-03-10 NOTE — THERAPY
Occupational Therapy  Daily Treatment     Patient Name: Israel Aviles  Age:  56 y.o., Sex:  male  Medical Record #: [id number]  Today's Date: 3/10/2022     Precautions  Precautions: (P) Fall Risk  Comments: (P) L hemiparesis, occasional L inattention, L BKA         Subjective    Pt received seated edge of bed, agreeable to OT session     Objective       03/10/22 1101   Precautions   Precautions Fall Risk   Comments L hemiparesis, occasional L inattention, L BKA   Functional Level of Assist   Bed, Chair, Wheelchair Transfer Standby Assist   Bed Chair Wheelchair Transfer Description Squat pivot transfer to wheelchair   Sitting Upper Body Exercises   Upper Extremity Bike Level 3 Resistance  (17 min fluidobike, no breaks)   OT Total Time Spent   OT Individual Total Time Spent (Mins) 30   OT Charge Group   OT Therapy Activity 2     Donned shoes and L prosthetic seated edge of bed with setup    Assessment    Pt tolerated session well, focus on progression of LUE strength and coordination via UE cycle, no c/o pain.     Strengths: Supportive family,Willingly participates in therapeutic activities,Pleasant and cooperative,Motivated for self care and independence,Good endurance,Good balance,Effective communication skills,Able to follow instructions  Barriers: Decreased endurance,Hemiparesis,Limited mobility,Impaired balance    Plan    LUE neuro re-education, ADLs, balance, functional transfers/mobility   w/ tech 3X week     supine   Left shoulder strengthening to assist with decreasing compensatory abduction      Passport items to be completed:  Perform bathroom transfers, complete dressing, complete feeding, get ready for the day, prepare a simple meal, participate in household tasks, adapt home for safety needs, demonstrate home exercise program, complete caregiver training     Occupational Therapy Goals (Active)       Problem: OT Long Term Goals       Dates: Start: 02/25/22         Goal: LTG-By discharge,  patient will complete basic self care tasks with mod I to supervision.        Dates: Start: 02/25/22            Goal: LTG-By discharge, patient will perform bathroom transfers with mod I to supervision.        Dates: Start: 02/25/22

## 2022-03-10 NOTE — PROGRESS NOTES
Rehab Progress Note     Encounter Date: 3/10/2022    CC: Decreased mobility, left sided weakness    Interval Events (Subjective)  Patient sitting up in room. He reports he is doing well. He only took a partial dose of his BB last night because he was afraid of low BP. Discussed about staying below 140s after CVA. He reports understanding. He reports his wife may come in again this afternoon. Dneies NVD. Denies SOB.     IDT Team Meeting 3/8/2022  DC/Disposition:  3/12/22    Objective:  VITAL SIGNS: /84   Pulse 82   Temp 36.5 °C (97.7 °F) (Oral)   Resp 16   Ht 1.829 m (6')   Wt 73 kg (160 lb 15 oz)   SpO2 95%   BMI 21.83 kg/m²   Gen: NAD  Psych: Mood and affect appropriate  CV: RRR, no edema  Resp: CTAB, no upper airway sounds  Abd: NTND  Neuro: AOx4, pushing wheelchair BUE  Unchanged from 3/9/22, in addition 4/5 LUE    No results found for this or any previous visit (from the past 72 hour(s)).    Current Facility-Administered Medications   Medication Frequency   • metoprolol SR (TOPROL XL) tablet 37.5 mg BID   • vitamin D3 (cholecalciferol) tablet 2,000 Units DAILY   • glipiZIDE (GLUCOTROL) tablet 2.5 mg BID AC   • metFORMIN (GLUCOPHAGE) tablet 500 mg BID WITH MEALS   • senna-docusate (PERICOLACE or SENOKOT S) 8.6-50 MG per tablet 2 Tablet BID W/MEALS PRN    And   • polyethylene glycol/lytes (MIRALAX) PACKET 1 Packet QDAY PRN    And   • magnesium hydroxide (MILK OF MAGNESIA) suspension 30 mL QDAY PRN    And   • bisacodyl (DULCOLAX) suppository 10 mg QDAY PRN   • Respiratory Therapy Consult Continuous RT   • hydrALAZINE (APRESOLINE) tablet 25 mg Q8HRS PRN   • acetaminophen (Tylenol) tablet 650 mg Q4HRS PRN   • artificial tears ophthalmic solution 1 Drop PRN   • benzocaine-menthol (Cepacol) lozenge 1 Lozenge Q2HRS PRN   • mag hydrox-al hydrox-simeth (MAALOX PLUS ES or MYLANTA DS) suspension 20 mL Q2HRS PRN   • ondansetron (ZOFRAN ODT) dispertab 4 mg 4X/DAY PRN    Or   • ondansetron (ZOFRAN) syringe/vial  injection 4 mg 4X/DAY PRN   • traZODone (DESYREL) tablet 50 mg QHS PRN   • sodium chloride (OCEAN) 0.65 % nasal spray 2 Spray PRN   • oxyCODONE immediate-release (ROXICODONE) tablet 5 mg Q3HRS PRN    Or   • oxyCODONE immediate release (ROXICODONE) tablet 10 mg Q3HRS PRN   • aspirin EC (ECOTRIN) tablet 81 mg DAILY   • atorvastatin (LIPITOR) tablet 80 mg DAILY   • clopidogrel (PLAVIX) tablet 75 mg DAILY   • gabapentin (NEURONTIN) capsule 300 mg Q EVENING   • promethazine (PHENERGAN) suppository 12.5-25 mg Q4HRS PRN   • promethazine (PHENERGAN) tablet 12.5-25 mg Q4HRS PRN       Orders Placed This Encounter   Procedures   • Diet Order Diet: Consistent CHO (Diabetic); Miscellaneous modifications: (optional): Vegetarian     Standing Status:   Standing     Number of Occurrences:   1     Order Specific Question:   Diet:     Answer:   Consistent CHO (Diabetic) [4]     Order Specific Question:   Miscellaneous modifications: (optional)     Answer:   Vegetarian [13]       Assessment:  Active Hospital Problems    Diagnosis    • *Right sided cerebral hemisphere cerebrovascular accident (CVA) (HCC)    • Below-knee amputation (HCC)    • CAD (coronary artery disease)    • HTN (hypertension)    • Vitamin D deficiency    • Uncontrolled type 2 diabetes mellitus with hyperglycemia (HCC)        Medical Decision Making and Plan:  R CVA - Patient with acute posterior internal capsule infarct with weakness UE > LE complicated by previous L BKA. Patient is improving quickly  Addendum: IGC changed to left body (R Brain)   -PT and OT for mobility and ADLs  -SLP For cognitive screen. Cognitive deficits are mild, would like to focus on physical tasks.      HTN/CAD - Patient on ASA and Plavix. Patient on Metoprolol 12.5 mg BID. Into 150s, increase to 25 mg BID. Still into 150s, increase to 37.5. Discussion about need to take higher dose.      DM2 with hyperglycemia - Patient on SSI and Metformin 500 mg BID on transfer.   -Hospitalist consulted.  Metformin increased to 1000 mg BID. Into 250s, discussed with hospitlaist and Glipizide added 2.5 mg. Metformin reduced to 500 mg BID and Glipizide increased to BID      L BKA - Limited due to weakness and weight of prosthetic. Monitor for skin breakdown     HLD - Patient on Atorvastatin 80 mg QHS     Neuropathy - Patient on Gabapentin 300 mg QHS     Vitamin D deficiency - 12 on admission. Start on 4000 U -> reduce to 2000 U     GI Ppx - Patient on senna-docusate and PPI. No GI complaints. Discontinue     DVT Ppx - Patient on Lovenox on transfer. Ambulating > 125 feet, discontinue. Having loss of balances due to prosthetic and weakness    Total time:  26 minutes.  I spent greater than 50% of the time for patient care, counseling, and coordination on this date, including unit/floor time, and face-to-face time with the patient as per interval events and assessment and plan above. Topics discussed included elevated SBP, need for good control after CVA, discussed compliance and follow-up with PCP.     Oliverio Bai M.D.

## 2022-03-10 NOTE — CARE PLAN
The patient is Watcher - Medium risk of patient condition declining or worsening    Shift Goals  Clinical Goals: Safety      Problem: Skin Integrity  Goal: Patient's skin integrity will be maintained or improve  Note: Patient's skin remains intact and free from new or accidental injury this shift.  Will continue to monitor.      Problem: Pain - Standard  Goal: Alleviation of pain or a reduction in pain to the patient’s comfort goal  Note: Patient has no complaints of pain, is able to verbalize pain level and verbalize an acceptable level of pain.

## 2022-03-10 NOTE — THERAPY
"Occupational Therapy  Daily Treatment     Patient Name: Israel Aviles  Age:  56 y.o., Sex:  male  Medical Record #: [id number]  Today's Date: 3/10/2022     Precautions  Precautions: (P) Fall Risk  Comments: (P) L hemiparesis, occasional L inattention, L BKA         Subjective    \" Thank you \"  stated at end of session      Objective/Assessment       03/10/22 1331   Precautions   Precautions Fall Risk   Comments L hemiparesis, occasional L inattention, L BKA   Functional Level of Assist   Bed, Chair, Wheelchair Transfer Standby Assist   Neuro-Muscular Treatments   Neuro-Muscular Treatments Weight Shift Left   Comments seated edge of mat  weight bearing through left UE   while reaching with right  left  UE externally rotated and in full elbow extension   Bed Mobility    Supine to Sit Minimal Assist  (with prosthesis donned)   Sit to Supine Modified Independent   Interdisciplinary Plan of Care Collaboration   Patient Position at End of Therapy Seated;Call Light within Reach;Tray Table within Reach   OT Total Time Spent   OT Individual Total Time Spent (Mins) 60   OT Charge Group   OT Neuromuscular Re-education / Balance 1   OT Therapeutic Exercise  3      Seated edge of mat   pectoral thoracic stretching  , weight bearing through left UE  and  active shoulder squeezes   x 10 reps      Supine on mat    un weighted  bilateral chest presses and shoulder flexion performed  x 5 reps x 3      left UE only  shoulder flexion x 5 reps x 3  holding   a PVC pipe.      Left UE shoulder flexion with 1lb weight peformed  x 5 reps but demonstrated decreased  increased difficulty and increased compensatory shoulder  abduction      Seated in w/c at  table:    Bilateral shoulder flexion pushing  chyna box up graded incline      required min cues and tactile input to not compensate with shoulder abduction.     Left UE only pushing beanbags up graded incline required  min mod verbal cues and min tactile cues  to addcut and maintain " adduction when pushing up.       Strengths: Supportive family,Willingly participates in therapeutic activities,Pleasant and cooperative,Motivated for self care and independence,Good endurance,Good balance,Effective communication skills,Able to follow instructions  Barriers: Decreased endurance,Hemiparesis,Limited mobility,Impaired balance    Plan    LUE neuro re-education, ADLs, balance, functional transfers/mobility   w/ tech 3X week     supine   Left shoulder strengthening to assist with decreasing compensatory abduction      Passport items to be completed:  Perform bathroom transfers, complete dressing, complete feeding, get ready for the day, prepare a simple meal, participate in household tasks, adapt home for safety needs, demonstrate home exercise program, complete caregiver training     Occupational Therapy Goals (Active)       Problem: OT Long Term Goals       Dates: Start: 02/25/22         Goal: LTG-By discharge, patient will complete basic self care tasks with mod I to supervision.        Dates: Start: 02/25/22            Goal: LTG-By discharge, patient will perform bathroom transfers with mod I to supervision.        Dates: Start: 02/25/22

## 2022-03-11 PROBLEM — D69.6 THROMBOCYTOPENIA (HCC): Status: RESOLVED | Noted: 2022-03-02 | Resolved: 2022-03-11

## 2022-03-11 PROCEDURE — 700102 HCHG RX REV CODE 250 W/ 637 OVERRIDE(OP): Performed by: HOSPITALIST

## 2022-03-11 PROCEDURE — 97535 SELF CARE MNGMENT TRAINING: CPT

## 2022-03-11 PROCEDURE — 700102 HCHG RX REV CODE 250 W/ 637 OVERRIDE(OP): Performed by: PHYSICAL MEDICINE & REHABILITATION

## 2022-03-11 PROCEDURE — A9270 NON-COVERED ITEM OR SERVICE: HCPCS | Performed by: PHYSICAL MEDICINE & REHABILITATION

## 2022-03-11 PROCEDURE — A9270 NON-COVERED ITEM OR SERVICE: HCPCS | Performed by: HOSPITALIST

## 2022-03-11 PROCEDURE — 97112 NEUROMUSCULAR REEDUCATION: CPT

## 2022-03-11 PROCEDURE — 97530 THERAPEUTIC ACTIVITIES: CPT

## 2022-03-11 PROCEDURE — 99238 HOSP IP/OBS DSCHRG MGMT 30/<: CPT | Performed by: PHYSICAL MEDICINE & REHABILITATION

## 2022-03-11 PROCEDURE — 770010 HCHG ROOM/CARE - REHAB SEMI PRIVAT*

## 2022-03-11 PROCEDURE — 97110 THERAPEUTIC EXERCISES: CPT

## 2022-03-11 RX ORDER — GLIPIZIDE 5 MG/1
2.5 TABLET ORAL
Qty: 60 TABLET | Refills: 2 | Status: SHIPPED | OUTPATIENT
Start: 2022-03-11 | End: 2022-06-16 | Stop reason: SDUPTHER

## 2022-03-11 RX ORDER — GABAPENTIN 300 MG/1
300 CAPSULE ORAL EVERY EVENING
Qty: 90 CAPSULE | Refills: 2 | Status: SHIPPED | OUTPATIENT
Start: 2022-03-11 | End: 2022-04-20

## 2022-03-11 RX ORDER — METOPROLOL SUCCINATE 25 MG/1
50 TABLET, EXTENDED RELEASE ORAL 2 TIMES DAILY
Qty: 120 TABLET | Refills: 2 | Status: SHIPPED | OUTPATIENT
Start: 2022-03-11 | End: 2023-06-08 | Stop reason: SDUPTHER

## 2022-03-11 RX ORDER — METOPROLOL SUCCINATE 25 MG/1
50 TABLET, EXTENDED RELEASE ORAL 2 TIMES DAILY
Status: DISCONTINUED | OUTPATIENT
Start: 2022-03-11 | End: 2022-03-12 | Stop reason: HOSPADM

## 2022-03-11 RX ORDER — CLOPIDOGREL BISULFATE 75 MG/1
75 TABLET ORAL DAILY
Qty: 30 TABLET | Refills: 2 | Status: SHIPPED | OUTPATIENT
Start: 2022-03-11 | End: 2022-11-08 | Stop reason: SDUPTHER

## 2022-03-11 RX ADMIN — METOPROLOL SUCCINATE 37.5 MG: 25 TABLET, EXTENDED RELEASE ORAL at 08:21

## 2022-03-11 RX ADMIN — METFORMIN HYDROCHLORIDE 500 MG: 500 TABLET ORAL at 17:15

## 2022-03-11 RX ADMIN — GLIPIZIDE 2.5 MG: 5 TABLET ORAL at 08:20

## 2022-03-11 RX ADMIN — GLIPIZIDE 2.5 MG: 5 TABLET ORAL at 17:15

## 2022-03-11 RX ADMIN — Medication 2000 UNITS: at 08:20

## 2022-03-11 RX ADMIN — METFORMIN HYDROCHLORIDE 500 MG: 500 TABLET ORAL at 08:20

## 2022-03-11 RX ADMIN — GABAPENTIN 300 MG: 300 CAPSULE ORAL at 22:11

## 2022-03-11 RX ADMIN — METOPROLOL SUCCINATE 25 MG: 25 TABLET, EXTENDED RELEASE ORAL at 22:11

## 2022-03-11 RX ADMIN — ATORVASTATIN CALCIUM 80 MG: 40 TABLET, FILM COATED ORAL at 08:22

## 2022-03-11 RX ADMIN — ASPIRIN 81 MG: 81 TABLET, COATED ORAL at 08:20

## 2022-03-11 RX ADMIN — CLOPIDOGREL BISULFATE 75 MG: 75 TABLET ORAL at 08:21

## 2022-03-11 ASSESSMENT — GAIT ASSESSMENTS
ASSISTIVE DEVICE: FRONT WHEEL WALKER
DISTANCE (FEET): 150
GAIT LEVEL OF ASSIST: SUPERVISED
DEVIATION: BRADYKINETIC;DECREASED BASE OF SUPPORT;DECREASED HEEL STRIKE;DECREASED TOE OFF

## 2022-03-11 ASSESSMENT — ACTIVITIES OF DAILY LIVING (ADL)
TOILETING_LEVEL_OF_ASSIST: ABLE TO COMPLETE TOILETING WITHOUT ASSIST
TOILET_TRANSFER_LEVEL_OF_ASSIST: REQUIRES SUPERVISION WITH TOILET TRANSFER
BED_CHAIR_WHEELCHAIR_TRANSFER_DESCRIPTION: INCREASED TIME;SUPERVISION FOR SAFETY
BED_CHAIR_WHEELCHAIR_TRANSFER_DESCRIPTION: SUPERVISION FOR SAFETY;VERBAL CUEING
TOILETING_LEVEL_OF_ASSIST_DESCRIPTION: GRAB BAR;INCREASED TIME
SHOWER_TRANSFER_LEVEL_OF_ASSIST: REQUIRES SUPERVISION WITH SHOWER TRANSFER
TOILET_TRANSFER_DESCRIPTION: GRAB BAR;SUPERVISION FOR SAFETY
TUB_SHOWER_TRANSFER_DESCRIPTION: GRAB BAR;SET-UP OF EQUIPMENT

## 2022-03-11 NOTE — CARE PLAN
Problem: OT Long Term Goals  Goal: LTG-By discharge, patient will complete basic self care tasks with mod I to supervision.   Outcome: Met  Goal: LTG-By discharge, patient will perform bathroom transfers with mod I to supervision.   Outcome: Met

## 2022-03-11 NOTE — DISCHARGE SUMMARY
Rehab Discharge Summary    Admission Date: 2/24/2022    Discharge Date: 3/12/2022    Attending Provider: Oliverio Bai MD/PhD    Admission Diagnosis:   Active Hospital Problems    Diagnosis    • *Right sided cerebral hemisphere cerebrovascular accident (CVA) (HCC)    • Thrombocytopenia (HCC)    • Below-knee amputation (HCC)    • CAD (coronary artery disease)    • HTN (hypertension)    • Vitamin D deficiency    • Uncontrolled type 2 diabetes mellitus with hyperglycemia (HCC)        Discharge Diagnosis:  Active Hospital Problems    Diagnosis    • *Right sided cerebral hemisphere cerebrovascular accident (CVA) (HCC)    • Thrombocytopenia (HCC)    • Below-knee amputation (HCC)    • CAD (coronary artery disease)    • HTN (hypertension)    • Vitamin D deficiency    • Uncontrolled type 2 diabetes mellitus with hyperglycemia (HCC)        HPI per H&P:  The patient is a 56 y.o. right hand dominant male with a past medical history of hypertension, hyperlipidemia type 2 diabetes, coronary artery disease, history of left frontal stroke in 2018, Left BKA in 12/2019;  who presented on 2/17/2022  9:37 AM with left-sided weakness, left facial droop.  Patient was evaluated by neurology on arrival to [facility], found to have an NIH score of 2 for left facial droop and left arm weakness.  Patient has been compliant with aspirin and Plavix.  Patient did not require TPA.  CT head showed no acute abnormalities, CT perfusion showed no mismatch.  MR brain found acute infarct in the right posterior limb of internal capsule adjacent to the basal ganglia.  MR C-spine was also pursued which found mild cervical degenerative changes.      Of note, patient was treated at [facility] rehab in late December 2019 and successfully discharged home with family support in early January 2020 for L BKA by Dr. Zhao.  Patient was treated by Dr. Alejandro Bai MD at that time.    Patient was admitted to [facility] on 2/24/2022.      Hospital Course by Problem List:  R CVA - Patient with acute posterior internal capsule infarct with weakness UE > LE complicated by previous L BKA. Patient is improving quickly  Addendum: IGC changed to left body (R Brain) as incorrectly listed as opposite side. SLP For cognitive screen. Cognitive deficits are mild, would like to focus on physical tasks. Patient underwent acute inpatient rehabilitation from 2/24/22 to 3/12/22 with good improvement in cognition, mobility and ADLs.   -Follow-up Stroke Clinic  -Referral to [facility]     HTN/CAD - Patient on ASA and Plavix. Patient on Metoprolol 12.5 mg BID. Into 150s, increase to 25 mg BID. Still into 150s, increase to 50 mg.  -Follow-up with PCP      DM2 with hyperglycemia - Patient on SSI and Metformin 500 mg BID on transfer. Hospitalist consulted. Metformin increased to 1000 mg BID. Into 250s, discussed with hospitlaist and Glipizide added 2.5 mg. Metformin reduced to 500 mg BID and Glipizide increased to BID   -Continue Metformin 500 mg BId and Glipizide 2.5 mg BID     L BKA - Limited due to weakness and weight of prosthetic. Monitor for skin breakdown     HLD - Patient on Atorvastatin 80 mg QHS     Neuropathy - Patient on Gabapentin 300 mg QHS     Vitamin D deficiency - 12 on admission. Start on 4000 U -> reduce to 2000 U OTC     GI Ppx - Patient on senna-docusate and PPI. No GI complaints. Discontinue      DVT Ppx - Patient on Lovenox on transfer. Ambulating > 125 feet, discontinue. Having loss of balances due to prosthetic and weakness    Functional Status at Discharge  Eating:  Independent  Eating Description:  Set-up of equipment or meal/tube feeding  Grooming:  Modified Independent,Seated  Grooming Description:  Seated in wheelchair at sink  Bathing:  Modified Independent  Bathing Description:  Grab bar,Hand held shower  Upper Body Dressing:  Modified Independent  Upper Body Dressing Description:  Increased time  Lower Body Dressing:   Supervision  Lower Body Dressing Description:  Grab bar,Increased time  Discharge Location : Home  Patient Discharging with Assist of: Spouse / Significant Other  Level of Supervision Required: Intermittent Supervision  Recommended Equipment for Discharge: Tub Transfer Bench;Shower Chair;Grab Bars by Toilet;Grab Bars in Tub / Shower;Hand Held Shower Head  Recommended Services Upon Discharge: Home Health Occupational Therapy  Long Term Goals Met: 2  Long Term Goals Not Met: 0  Criteria for Termination of Services: Maximum Function Achieved for Inpatient Rehabilitation  Walk:  Supervised  Distance Walked:  150  Number of Times Distance Was Traveled:  1  Assistive Device:  Front Wheel Walker  Gait Deviation:  Bradykinetic,Decreased Base Of Support,Decreased Heel Strike,Decreased Toe Off  Wheelchair:  Modified Independent  Distance Propelled:  150   Wheelchair Description:  Extra time,Limited by fatigue  Stairs Standby Assist  Stairs Description Extra time,Hand rails,Assist device/equipment  Discharge Location: Home  Patient Discharging with Assist of: Spouse / Significant Other;Family  Level of Supervision Required Upon Discharge: Intermittent Supervision  Recommended Equipment for Discharge: Front-Wheeled Walker;Manual Wheelchair  Recommeded Services Upon Discharge: Outpatient Physical Therapy  Long Term Goals Met: 0  Long Term Goals Not Met: 5  Reason(s) for Goals Not Met: pt's CGA throughout, not at SPV level yet  Criteria for Termination of Services: Maximum Function Achieved for Inpatient Rehabilitation  Comprehension:  Modified Independent  Comprehension Description:  Glasses  Expression:  Independent  Expression Description:  Verbal cueing  Social Interaction:  Independent  Social Interaction Description:     Problem Solving:  Supervision  Problem Solving Description:  Therapy schedule,Verbal cueing  Memory:  Supervision  Memory Description:  Therapy schedule,Verbal cueing       IOliverio M.D.,  personally performed a complete drug regimen review and no potential clinically significant medication issues were identified.   Discharge Medication:     Medication List      START taking these medications      Instructions   Cholecalciferol 2000 UNIT Tabs  Start taking on: March 12, 2022   Take 1 Tablet by mouth every day.  Dose: 2,000 Units     glipiZIDE 5 MG Tabs  Commonly known as: GLUCOTROL   Take 0.5 Tablets by mouth 2 times daily, before breakfast and dinner.  Dose: 2.5 mg        CHANGE how you take these medications      Instructions   metoprolol SR 25 MG Tb24  What changed: how much to take  Commonly known as: TOPROL XL   Take 2 Tablets by mouth 2 times a day.  Dose: 50 mg        CONTINUE taking these medications      Instructions   aspirin 81 MG EC tablet   Take 1 tablet by mouth every day.  Dose: 81 mg     atorvastatin 80 MG tablet  Commonly known as: LIPITOR   Take 1 Tab by mouth every day.  Dose: 80 mg     clopidogrel 75 MG Tabs  Commonly known as: PLAVIX   Take 1 Tablet by mouth every day.  Dose: 75 mg     gabapentin 300 MG Caps  Commonly known as: NEURONTIN   Take 1 Capsule by mouth every evening.  Dose: 300 mg     metFORMIN 500 MG Tabs  Commonly known as: GLUCOPHAGE   Take 1 Tablet by mouth 2 times a day with meals.  Dose: 500 mg     multivitamin Tabs   Take 1 Tablet by mouth every day.  Dose: 1 Tablet            Discharge Diet:  Diabetic    Discharge Activity:  As tolerated in prosthetic     Disposition:  Patient to discharge home with family support and community resources.     Equipment:  FWW    Follow-up & Discharge Instructions:  Follow up with your primary care provider (PCP) within 7-10 days of discharge to review your medications and take over your care.     If you develop chest pain, fever, chills, change in neurologic function (weakness, sensation changes, vision changes), or other concerning sxs, seek immediate medical attention or call 911.      Condition on Discharge:  Good    [place]  Alejandro Bai M.D.

## 2022-03-11 NOTE — PROGRESS NOTES
Rehab Progress Note     Encounter Date: 3/11/2022    CC: Decreased mobility, left sided weakness    Interval Events (Subjective)  Patient sitting up in room. He reports he is doing well. He reports he feels much more comfortable going home now. He has been walking > 150 feet which would allow him to mobilize around his home when his wife is at work. Denies NVD. Discussed discharge planning for tomorrow. Discussed discharge medications. SBP still into 140-150s and now taking the increased metoprolol. Discussed about follow-up with PCP for good SBP control after CVA.     IDT Team Meeting 3/8/2022  DC/Disposition:  3/12/22    Objective:  VITAL SIGNS: /68   Pulse 64   Temp 36.5 °C (97.7 °F) (Oral)   Resp 16   Ht 1.829 m (6')   Wt 73 kg (160 lb 15 oz)   SpO2 97%   BMI 21.83 kg/m²   Gen: NAD  Psych: Mood and affect appropriate  CV: RRR, no edema  Resp: CTAB, no upper airway sounds  Abd: NTND  Neuro: AOx4, ambulating with FWW    No results found for this or any previous visit (from the past 72 hour(s)).    Current Facility-Administered Medications   Medication Frequency   • metoprolol SR (TOPROL XL) tablet 37.5 mg BID   • vitamin D3 (cholecalciferol) tablet 2,000 Units DAILY   • glipiZIDE (GLUCOTROL) tablet 2.5 mg BID AC   • metFORMIN (GLUCOPHAGE) tablet 500 mg BID WITH MEALS   • senna-docusate (PERICOLACE or SENOKOT S) 8.6-50 MG per tablet 2 Tablet BID W/MEALS PRN    And   • polyethylene glycol/lytes (MIRALAX) PACKET 1 Packet QDAY PRN    And   • magnesium hydroxide (MILK OF MAGNESIA) suspension 30 mL QDAY PRN    And   • bisacodyl (DULCOLAX) suppository 10 mg QDAY PRN   • Respiratory Therapy Consult Continuous RT   • hydrALAZINE (APRESOLINE) tablet 25 mg Q8HRS PRN   • acetaminophen (Tylenol) tablet 650 mg Q4HRS PRN   • artificial tears ophthalmic solution 1 Drop PRN   • benzocaine-menthol (Cepacol) lozenge 1 Lozenge Q2HRS PRN   • mag hydrox-al hydrox-simeth (MAALOX PLUS ES or MYLANTA DS) suspension 20 mL Q2HRS  PRN   • ondansetron (ZOFRAN ODT) dispertab 4 mg 4X/DAY PRN    Or   • ondansetron (ZOFRAN) syringe/vial injection 4 mg 4X/DAY PRN   • traZODone (DESYREL) tablet 50 mg QHS PRN   • sodium chloride (OCEAN) 0.65 % nasal spray 2 Spray PRN   • oxyCODONE immediate-release (ROXICODONE) tablet 5 mg Q3HRS PRN    Or   • oxyCODONE immediate release (ROXICODONE) tablet 10 mg Q3HRS PRN   • aspirin EC (ECOTRIN) tablet 81 mg DAILY   • atorvastatin (LIPITOR) tablet 80 mg DAILY   • clopidogrel (PLAVIX) tablet 75 mg DAILY   • gabapentin (NEURONTIN) capsule 300 mg Q EVENING   • promethazine (PHENERGAN) suppository 12.5-25 mg Q4HRS PRN   • promethazine (PHENERGAN) tablet 12.5-25 mg Q4HRS PRN       Orders Placed This Encounter   Procedures   • Diet Order Diet: Consistent CHO (Diabetic); Miscellaneous modifications: (optional): Vegetarian     Standing Status:   Standing     Number of Occurrences:   1     Order Specific Question:   Diet:     Answer:   Consistent CHO (Diabetic) [4]     Order Specific Question:   Miscellaneous modifications: (optional)     Answer:   Vegetarian [13]       Assessment:  Active Hospital Problems    Diagnosis    • *Right sided cerebral hemisphere cerebrovascular accident (CVA) (HCC)    • Below-knee amputation (HCC)    • CAD (coronary artery disease)    • HTN (hypertension)    • Vitamin D deficiency    • Uncontrolled type 2 diabetes mellitus with hyperglycemia (HCC)        Medical Decision Making and Plan:  R CVA - Patient with acute posterior internal capsule infarct with weakness UE > LE complicated by previous L BKA. Patient is improving quickly  Addendum: IGC changed to left body (R Brain) as incorrectly listed as opposite side.   -PT and OT for mobility and ADLs  -SLP For cognitive screen. Cognitive deficits are mild, would like to focus on physical tasks.      HTN/CAD - Patient on ASA and Plavix. Patient on Metoprolol 12.5 mg BID. Into 150s, increase to 25 mg BID. Still into 150s, increase to 50  mg.  -Follow-up with PCP      DM2 with hyperglycemia - Patient on SSI and Metformin 500 mg BID on transfer.   -Hospitalist consulted. Metformin increased to 1000 mg BID. Into 250s, discussed with hospitlaist and Glipizide added 2.5 mg. Metformin reduced to 500 mg BID and Glipizide increased to BID      L BKA - Limited due to weakness and weight of prosthetic. Monitor for skin breakdown     HLD - Patient on Atorvastatin 80 mg QHS     Neuropathy - Patient on Gabapentin 300 mg QHS     Vitamin D deficiency - 12 on admission. Start on 4000 U -> reduce to 2000 U     GI Ppx - Patient on senna-docusate and PPI. No GI complaints. Discontinue     DVT Ppx - Patient on Lovenox on transfer. Ambulating > 125 feet, discontinue. Having loss of balances due to prosthetic and weakness    Total time:  36 minutes.  I spent greater than 50% of the time for patient care, counseling, and coordination on this date, including unit/floor time, and face-to-face time with the patient as per interval events and assessment and plan above. Topics discussed included discharge planning, discharge medications, elevated SBP, and increase metoprolol.     Oliverio Bai M.D.

## 2022-03-11 NOTE — CARE PLAN
The patient is Stable - Low risk of patient condition declining or worsening    Shift Goals  Clinical Goals: Safety  Patient Goals: Safety    Progress made toward(s) clinical / shift goals:    Problem: Pain - Standard  Goal: Alleviation of pain or a reduction in pain to the patient’s comfort goal  Outcome: Progressing  Note: Patient able to verbalize needs.  Denies pain or discomfort this shift and no s/s same noted.  Will continue to monitor.      VSS, Sleeping comfortably during rounds. No complaints.

## 2022-03-11 NOTE — DISCHARGE PLANNING
CM  16 x 16 cushion ordered through [facility].        Follow-up Information:   Donny Mcnally M.D.  [address]  [place] [id number] [phone]     On 3/16/2022  Primary Care MD.  11:00am with a 10:45am check in time.  Correct address is: [address] [place], [place] [address]      [facility]  [address]  [place] [address]  [phone]  On 3/16/2022  [facility]. 8:20am.  Appointment is with Lara Sultana.       [facility]  [address]  [place] [address] [phone]     Provider for cushion for your wheelchair.      [facility]  [address]  [place]  [id number] [phone]     Referral sent for out patient therapies; please call to schedule follow up.          DC planned for Sat 3/12.  Reviewed with wife also.

## 2022-03-11 NOTE — THERAPY
Occupational Therapy  Daily Treatment     Patient Name: Israel Aviles  Age:  56 y.o., Sex:  male  Medical Record #: [id number]  Today's Date: 3/11/2022     Precautions  Precautions: (P) Fall Risk  Comments: (P) L hemiparesis, occasional L inattention, L BKA         Subjective    Pt agreeable to shower and ADLs for OT session     Objective       03/11/22 0701   Precautions   Precautions Fall Risk   Comments L hemiparesis, occasional L inattention, L BKA   Functional Level of Assist   Eating Independent   Grooming Modified Independent;Seated   Grooming Description Seated in wheelchair at sink   Bathing Modified Independent   Bathing Description Grab bar;Hand held shower   Upper Body Dressing Modified Independent   Upper Body Dressing Description Increased time   Lower Body Dressing Supervision   Lower Body Dressing Description Grab bar;Increased time   Toileting Modified Independent   Toileting Description Grab bar;Increased time   Bed, Chair, Wheelchair Transfer Supervised   Bed Chair Wheelchair Transfer Description Increased time;Supervision for safety   Toilet Transfers Supervised   Toilet Transfer Description Grab bar;Supervision for safety   Tub / Shower Transfers Supervised   Tub Shower Transfer Description Grab bar;Set-up of equipment   Sitting Upper Body Exercises   Comments UE home program given   Interdisciplinary Plan of Care Collaboration   Patient Position at End of Therapy Seated;Phone within Reach;Tray Table within Reach;Call Light within Reach   OT Total Time Spent   OT Individual Total Time Spent (Mins) 60   OT Charge Group   OT Self Care / ADL 3   OT Therapy Activity 1   Pt given UB exercise home program to cont increasing coordination and strength in BUEs.            Assessment    Pt tolerated session well focused on ADLs. Pt demonstrated increased independence in all ADLs since eval. Pt reports him and his wife are ready for him to d/c tomorrow. Pt cont to be motivated to increase independence  post d/c. Pt requested UB exercise home program and was given handout seen above.   Strengths: Supportive family,Willingly participates in therapeutic activities,Pleasant and cooperative,Motivated for self care and independence,Good endurance,Good balance,Effective communication skills,Able to follow instructions  Barriers: Decreased endurance,Hemiparesis,Limited mobility,Impaired balance    Plan    D/c tomorrow 3/12    Occupational Therapy Goals (Active)       Problem: OT Long Term Goals       Dates: Start: 02/25/22         Goal: LTG-By discharge, patient will complete basic self care tasks with mod I to supervision.        Dates: Start: 02/25/22            Goal: LTG-By discharge, patient will perform bathroom transfers with mod I to supervision.        Dates: Start: 02/25/22

## 2022-03-11 NOTE — THERAPY
Occupational Therapy  Daily Treatment     Patient Name: Israel Aviles  Age:  56 y.o., Sex:  male  Medical Record #: [id number]  Today's Date: 3/11/2022     Precautions  Precautions: (P) Fall Risk  Comments: (P) L genie, occasional L inattention, L BKA         Subjective    Pt requested to have L shoulder re-taped      Objective       03/11/22 1301   Precautions   Precautions Fall Risk   Comments L genie, occasional L inattention, L BKA   Fine Motor / Dexterity    Fine Motor / Dexterity Yes   Fine Motor / Dexterity Interventions clothespin tree   Comments  Pt completed clothespin tree to address FM coordination and hand strength.   Neuro-Muscular Treatments   Neuro-Muscular Treatments Taping   Interdisciplinary Plan of Care Collaboration   IDT Collaboration with  Physical Therapist   Patient Position at End of Therapy Seated;Other (Comments)  (at table in gym)   Collaboration Comments transition of care   OT Total Time Spent   OT Individual Total Time Spent (Mins) 30   OT Charge Group   OT Neuromuscular Re-education / Balance 1   OT Therapy Activity 1   Pt and pt's spouse were educated on purpose of taping of shoulder for support and pain reductions.     Assessment    Pt tolerated session well focused on taping and FM coordination. Pt and pt's wife report all questions have been answered and they are ready for d/c tomorrow.   Strengths: Supportive family,Willingly participates in therapeutic activities,Pleasant and cooperative,Motivated for self care and independence,Good endurance,Good balance,Effective communication skills,Able to follow instructions  Barriers: Decreased endurance,Hemiparesis,Limited mobility,Impaired balance    Plan    D/c home tomorrow 3/12    Occupational Therapy Goals (Active)       There are no active problems.

## 2022-03-11 NOTE — CARE PLAN
Problem: Knowledge Deficit - Standard  Goal: Patient and family/care givers will demonstrate understanding of plan of care, disease process/condition, diagnostic tests and medications  Outcome: Progressing     Problem: Discharge Barriers/Planning  Goal: Patient's continuum of care needs are met  Outcome: Progressing   The patient is Stable - Low risk of patient condition declining or worsening    Shift Goals  Clinical Goals: Safety  Patient Goals: Safety

## 2022-03-11 NOTE — THERAPY
Occupational Therapy  Daily Treatment     Patient Name: Israel Aviles  Age:  56 y.o., Sex:  male  Medical Record #: [id number]  Today's Date: 3/11/2022     Precautions  Precautions: (P) Fall Risk  Comments: (P) L genie, occasional L inattention, L BKA         Subjective    Patient sitting in w/c in his room and ready for OT.  Requested to work on FMC of left hand.     Objective       03/11/22 1101   Precautions   Precautions Fall Risk   Comments L genie, occasional L inattention, L BKA   Interdisciplinary Plan of Care Collaboration   Patient Position at End of Therapy Seated;Call Light within Reach;Tray Table within Reach;Phone within Reach   OT Total Time Spent   OT Individual Total Time Spent (Mins) 30   OT Charge Group   OT Therapy Activity 2     Functional bilateral fine motor coordination task of working with various types of clothing fasteners, such as bows, buttons, zippers and clasps.      L UE FMC/GMC task of picking up, manipulating and placing yellow, red and green resistive clothespins on vertical and horizontal bars.      Assessment    Patient's L UE function has greatly improved since admit.  Is ready to d/c home tomorrow.  Strengths: Supportive family,Willingly participates in therapeutic activities,Pleasant and cooperative,Motivated for self care and independence,Good endurance,Good balance,Effective communication skills,Able to follow instructions  Barriers: Decreased endurance,Hemiparesis,Limited mobility,Impaired balance    Plan    D/C home tomorrow    Occupational Therapy Goals (Active)       There are no active problems.

## 2022-03-11 NOTE — THERAPY
"Physical Therapy   Daily Treatment     Patient Name: Israel Aviles  Age:  56 y.o., Sex:  male  Medical Record #: [id number]  Today's Date: 3/11/2022     Precautions  Precautions: Fall Risk  Comments: L genie, occasional L inattention, L BKA    Subjective    Pt seated in room, agreeable to PT.      Objective       03/11/22 0831   Precautions   Precautions Fall Risk   Comments L genie, occasional L inattention, L BKA   Gait Functional Level of Assist    Gait Level Of Assist Supervised   Assistive Device Front Wheel Walker   Distance (Feet) 150   # of Times Distance was Traveled 1   Deviation Bradykinetic;Decreased Base Of Support;Decreased Heel Strike;Decreased Toe Off   Wheelchair Functional Level of Assist   Wheelchair Assist Modified Independent   Distance Wheelchair (Feet or Distance) 150   Wheelchair Description Extra time;Limited by fatigue   Stairs Functional Level of Assist   Level of Assist with Stairs Standby Assist   # of Stairs Climbed 12  (8 6\" and 4 6\" with seated rest break between)   Stairs Description Extra time;Hand rails;Assist device/equipment   Transfer Functional Level of Assist   Bed, Chair, Wheelchair Transfer Supervised  (flat, full size bed)   Bed Chair Wheelchair Transfer Description Supervision for safety;Verbal cueing  (reminder to lock L brake)   Sitting Lower Body Exercises   Nustep Resistance Level 4  (10 min, 0.29 miles)   Bed Mobility    Supine to Sit Modified Independent   Sit to Supine Modified Independent   Sit to Stand Supervised   Scooting Modified Independent   Rolling Modified Independent   Interdisciplinary Plan of Care Collaboration   IDT Collaboration with  Physical Therapist Assistant (PTA)   Patient Position at End of Therapy Seated;Call Light within Reach;Tray Table within Reach   Collaboration Comments re: CLOF and DC tomorrow   PT Total Time Spent   PT Individual Total Time Spent (Mins) 60   PT Charge Group   PT Therapeutic Exercise 1   PT Therapeutic Activities 3 " "    Pt ascended and descended 6\" curb/ step with FWW and SBA.     Assessment    Pt motivated and fully participatory in session. Demonstrates good safety awareness with functional mobility and readiness to transition home tomorrow with support from wife and son.     Strengths: Able to follow instructions,Alert and oriented,Effective communication skills,Good insight into deficits/needs,Independent prior level of function,Making steady progress towards goals,Manages pain appropriately,Motivated for self care and independence,Pleasant and cooperative,Supportive family,Willingly participates in therapeutic activities  Barriers: Decreased endurance,Generalized weakness,Impaired activity tolerance,Impaired balance,Limited mobility    Plan    DC tomorrow. Complete outdoor WC mobility and fall recovery education.       Physical Therapy Problems (Active)       Problem: PT-Long Term Goals       Dates: Start: 02/25/22         Goal: LTG-By discharge, patient will ambulate 300 feet with SPC and L BKA prosthetic at SPV level.       Dates: Start: 02/25/22            Goal: LTG-By discharge, patient will transfer one surface to another with SPC and L BKA prosthetic at IND level.       Dates: Start: 02/25/22            Goal: LTG-By discharge, patient will perform home exercise program with handout and L BKA prosthetic at IND level.       Dates: Start: 02/25/22            Goal: LTG-By discharge, patient will transfer in/out of a car with SPC and L BKA prosthetic at SPV level.       Dates: Start: 02/25/22            Goal: LTG-By discharge, patient will ascend/descend a 4\" curb with SPC and L BKA prosthetic at SPV level.       Dates: Start: 02/25/22              "

## 2022-03-11 NOTE — THERAPY
Physical Therapy   Daily Treatment     Patient Name: Israel Aviles  Age:  56 y.o., Sex:  male  Medical Record #: [id number]  Today's Date: 3/11/2022     Precautions  Precautions: Fall Risk  Comments: L genie, occasional L inattention, L BKA    Subjective    Patient agreeable to PT; received sitting in w/c in gym.  Pt reports having completed floor recovery successfully already.  Patient reports w/c cushion and sling seat are being replaced through [employer] and [employer] already.     Objective       03/11/22 1401   Vitals   O2 (LPM) 0   O2 Delivery Device None - Room Air   Wheelchair Functional Level of Assist   Wheelchair Assist Modified Independent   Distance Wheelchair (Feet or Distance) 3x 100-175 feet outdoors with some indoor distances   Wheelchair Description Extra time;Limited by fatigue  (BUE propulsion with variable BLE usage)   Interdisciplinary Plan of Care Collaboration   IDT Collaboration with  Physical Therapist   Patient Position at End of Therapy Seated;Self Releasing Lap Belt Applied;Call Light within Reach;Tray Table within Reach;Phone within Reach   Collaboration Comments Treatment plan; reviewed patient passport with patient (completed), discussed floor recovery and pt reports having demonstrated yesterday.   PT Total Time Spent   PT Individual Total Time Spent (Mins) 30   PT Charge Group   PT Therapeutic Activities 2        03/11/22 1401   Wheel 50 Feet with Two Turns   Assistance Needed Adaptive equipment   Physical Assistance Level No physical assistance   CARE Score - Wheel 50 Feet with Two Turns 6   Type of Wheelchair/Scooter Manual   Wheel 150 Feet   Assistance Needed Adaptive equipment   Physical Assistance Level No physical assistance   CARE Score - Wheel 150 Feet 6   Type of Wheelchair/Scooter Manual       Assessment    Patient has fair safety awareness with outdoor w/c mobility today; good verbalization with education and POC discussions; patinet is about ready for next level of  "care.    Strengths: Able to follow instructions,Alert and oriented,Effective communication skills,Good insight into deficits/needs,Independent prior level of function,Making steady progress towards goals,Manages pain appropriately,Motivated for self care and independence,Pleasant and cooperative,Supportive family,Willingly participates in therapeutic activities  Barriers: Decreased endurance,Generalized weakness,Impaired activity tolerance,Impaired balance,Limited mobility    Plan    D/C home tomorrow.    Passport items to be completed: none.    Physical Therapy Problems (Active)       Problem: PT-Long Term Goals       Dates: Start: 02/25/22         Goal: LTG-By discharge, patient will ambulate 300 feet with SPC and L BKA prosthetic at SPV level.       Dates: Start: 02/25/22            Goal: LTG-By discharge, patient will transfer one surface to another with SPC and L BKA prosthetic at IND level.       Dates: Start: 02/25/22            Goal: LTG-By discharge, patient will perform home exercise program with handout and L BKA prosthetic at IND level.       Dates: Start: 02/25/22            Goal: LTG-By discharge, patient will transfer in/out of a car with SPC and L BKA prosthetic at SPV level.       Dates: Start: 02/25/22            Goal: LTG-By discharge, patient will ascend/descend a 4\" curb with SPC and L BKA prosthetic at SPV level.       Dates: Start: 02/25/22              "

## 2022-03-12 VITALS
HEIGHT: 72 IN | TEMPERATURE: 97.7 F | RESPIRATION RATE: 18 BRPM | WEIGHT: 160.94 LBS | BODY MASS INDEX: 21.8 KG/M2 | DIASTOLIC BLOOD PRESSURE: 84 MMHG | HEART RATE: 73 BPM | OXYGEN SATURATION: 99 % | SYSTOLIC BLOOD PRESSURE: 147 MMHG

## 2022-03-12 PROCEDURE — A9270 NON-COVERED ITEM OR SERVICE: HCPCS | Performed by: HOSPITALIST

## 2022-03-12 PROCEDURE — 700102 HCHG RX REV CODE 250 W/ 637 OVERRIDE(OP): Performed by: HOSPITALIST

## 2022-03-12 PROCEDURE — 700102 HCHG RX REV CODE 250 W/ 637 OVERRIDE(OP): Performed by: PHYSICAL MEDICINE & REHABILITATION

## 2022-03-12 PROCEDURE — A9270 NON-COVERED ITEM OR SERVICE: HCPCS | Performed by: PHYSICAL MEDICINE & REHABILITATION

## 2022-03-12 RX ADMIN — ASPIRIN 81 MG: 81 TABLET, COATED ORAL at 07:55

## 2022-03-12 RX ADMIN — Medication 2000 UNITS: at 07:55

## 2022-03-12 RX ADMIN — METFORMIN HYDROCHLORIDE 500 MG: 500 TABLET ORAL at 07:55

## 2022-03-12 RX ADMIN — METOPROLOL SUCCINATE 50 MG: 25 TABLET, EXTENDED RELEASE ORAL at 07:56

## 2022-03-12 RX ADMIN — CLOPIDOGREL BISULFATE 75 MG: 75 TABLET ORAL at 07:55

## 2022-03-12 RX ADMIN — ATORVASTATIN CALCIUM 80 MG: 40 TABLET, FILM COATED ORAL at 07:54

## 2022-03-12 RX ADMIN — GLIPIZIDE 2.5 MG: 5 TABLET ORAL at 07:55

## 2022-03-12 RX ADMIN — SENNOSIDES AND DOCUSATE SODIUM 2 TABLET: 50; 8.6 TABLET ORAL at 08:00

## 2022-03-12 NOTE — PROGRESS NOTES
Discharge instructions provided to patient & spouse & both demonstrated good understanding.  Left via own car at 10:40.

## 2022-03-12 NOTE — DISCHARGE INSTRUCTIONS
[facility] NURSING DISCHARGE INSTRUCTIONS    Blood Pressure: 135/84  Weight: 73 kg (160 lb 15 oz)  Nursing recommendations for Manan Aviles at time of discharge are as follows:  CLIENT verbalized understanding of all discharge instructions and prescriptions.     Review all your home medications and newly ordered medications with your doctor and/or pharmacist. Follow medication instructions as directed by your doctor and/or pharmacist.    Pain Management:   Discharge Pain Medication Instructions:  Comfort Goal: Comfort with Movement,Perform Activity  Notify your primary care provider if pain is unrelieved with these measures, if the pain is new, or increased in intensity.    Discharge Skin Characteristics:    Discharge Skin Exam:       Skin / Wound Care Instructions: Please contact your primary care physician for any change in skin integrity.    If You Have Surgical Incisions / Wounds:  Monitor surgical site(s) for signs of increased swelling, redness or symptoms of drainage from the site or fever as this could indicate signs and symptoms of infection. If these symptoms are noted, notifiy your primary care provider.      Discharge Safety Instructions: Should Have ADULT SUPERVISION     Discharge Safety Concerns: Weakness,Unsteady Gait  The interdisciplinary team has made recommendation that you need SUPERVISION in the house due to weakness.  Anti-embolic stockings are  NOT REQUIRED to increase circulation to the lower extremities.    Discharge Diet: Diabetic regular texture     Discharge Liquids: Thin  Discharge Bowel Function: Continent  Please contact your primary care physician for any changes in bowel habits.  Discharge Bowel Program:    Discharge Bladder Function: Continent  Discharge Urinary Devices: None      Nursing Discharge Plan:   Influenza Vaccine Indication:  (WIFE CONFIRMED FLU SHOT)    Case Management Discharge Instructions:   Discharge Location:    Agency  Name/Address/Phone:    Home Health:    Outpatient Services:    [facility] Provider/Phone:    Medical Equipment Ordered:    Prescription Faxed to:        Discharge Medication Instructions:  Below are the medications your physician expects you to take upon discharge:      [facility] NURSING DISCHARGE INSTRUCTIONS    Blood Pressure:   Weight: 73 kg (160 lb 15 oz)  Nursing recommendations for Manan Aviles at time of discharge are as follows:  Client and Family Member verbalized understanding of all discharge instructions and prescriptions.     Review all your home medications and newly ordered medications with your doctor and/or pharmacist. Follow medication instructions as directed by your doctor and/or pharmacist.    Pain Management:   Discharge Pain Medication Instructions:  Comfort Goal: Comfort with Movement,Perform Activity  Notify your primary care provider if pain is unrelieved with these measures, if the pain is new, or increased in intensity.    Discharge Skin Characteristics:    Discharge Skin Exam:       Skin / Wound Care Instructions: Please contact your primary care physician for any change in skin integrity.     If You Have Surgical Incisions / Wounds:  Monitor surgical site(s) for signs of increased swelling, redness or symptoms of drainage from the site or fever as this could indicate signs and symptoms of infection. If these symptoms are noted, notifiy your primary care provider.      Discharge Safety Instructions: Should Have ADULT SUPERVISION     Discharge Safety Concerns: Weakness,Unsteady Gait  The interdisciplinary team has made recommendation that you should have adult supervision in the house due to weakness and unsteady gait  Anti-embolic stockings are not required to increase circulation to the lower extremities.      Fall Prevention in the Home, Adult  Falls can cause injuries. They can happen to people of all ages. There are many things you can do to make your home safe and  to help prevent falls. Ask for help when making these changes, if needed.  What actions can I take to prevent falls?  General Instructions  · Use good lighting in all rooms. Replace any light bulbs that burn out.  · Turn on the lights when you go into a dark area. Use night-lights.  · Keep items that you use often in easy-to-reach places. Lower the shelves around your home if necessary.  · Set up your furniture so you have a clear path. Avoid moving your furniture around.  · Do not have throw rugs and other things on the floor that can make you trip.  · Avoid walking on wet floors.  · If any of your floors are uneven, fix them.  · Add color or contrast paint or tape to clearly geoffrey and help you see:  ? Any grab bars or handrails.  ? First and last steps of stairways.  ? Where the edge of each step is.  · If you use a stepladder:  ? Make sure that it is fully opened. Do not climb a closed stepladder.  ? Make sure that both sides of the stepladder are locked into place.  ? Ask someone to hold the stepladder for you while you use it.  · If there are any pets around you, be aware of where they are.  What can I do in the bathroom?         · Keep the floor dry. Clean up any water that spills onto the floor as soon as it happens.  · Remove soap buildup in the tub or shower regularly.  · Use non-skid mats or decals on the floor of the tub or shower.  · Attach bath mats securely with double-sided, non-slip rug tape.  · If you need to sit down in the shower, use a plastic, non-slip stool.  · Install grab bars by the toilet and in the tub and shower. Do not use towel bars as grab bars.  What can I do in the bedroom?  · Make sure that you have a light by your bed that is easy to reach.  · Do not use any sheets or blankets that are too big for your bed. They should not hang down onto the floor.  · Have a firm chair that has side arms. You can use this for support while you get dressed.  What can I do in the kitchen?  · Clean up  any spills right away.  · If you need to reach something above you, use a strong step stool that has a grab bar.  · Keep electrical cords out of the way.  · Do not use floor polish or wax that makes floors slippery. If you must use wax, use non-skid floor wax.  What can I do with my stairs?  · Do not leave any items on the stairs.  · Make sure that you have a light switch at the top of the stairs and the bottom of the stairs. If you do not have them, ask someone to add them for you.  · Make sure that there are handrails on both sides of the stairs, and use them. Fix handrails that are broken or loose. Make sure that handrails are as long as the stairways.  · Install non-slip stair treads on all stairs in your home.  · Avoid having throw rugs at the top or bottom of the stairs. If you do have throw rugs, attach them to the floor with carpet tape.  · Choose a carpet that does not hide the edge of the steps on the stairway.  · Check any carpeting to make sure that it is firmly attached to the stairs. Fix any carpet that is loose or worn.  What can I do on the outside of my home?  · Use bright outdoor lighting.  · Regularly fix the edges of walkways and driveways and fix any cracks.  · Remove anything that might make you trip as you walk through a door, such as a raised step or threshold.  · Trim any bushes or trees on the path to your home.  · Regularly check to see if handrails are loose or broken. Make sure that both sides of any steps have handrails.  · Install guardrails along the edges of any raised decks and porches.  · Clear walking paths of anything that might make someone trip, such as tools or rocks.  · Have any leaves, snow, or ice cleared regularly.  · Use sand or salt on walking paths during winter.  · Clean up any spills in your garage right away. This includes grease or oil spills.  What other actions can I take?  · Wear shoes that:  ? Have a low heel. Do not wear high heels.  ? Have rubber  bottoms.  ? Are comfortable and fit you well.  ? Are closed at the toe. Do not wear open-toe sandals.  · Use tools that help you move around (mobility aids) if they are needed. These include:  ? Canes.  ? Walkers.  ? Scooters.  ? Crutches.  · Review your medicines with your doctor. Some medicines can make you feel dizzy. This can increase your chance of falling.  Ask your doctor what other things you can do to help prevent falls.  Where to find more information  · Centers for Disease Control and Prevention, [place]: https://cdc.gov  · National [place] on Aging: https://gh1nrrd.bryn.nih.gov  Contact a doctor if:  · You are afraid of falling at home.  · You feel weak, drowsy, or dizzy at home.  · You fall at home.  Summary  · There are many simple things that you can do to make your home safe and to help prevent falls.  · Ways to make your home safe include removing tripping hazards and installing grab bars in the bathroom.  · Ask for help when making these changes in your home.  This information is not intended to replace advice given to you by your health care provider. Make sure you discuss any questions you have with your health care provider.  Document Released: 10/14/2010 Document Revised: 04/09/2020 Document Reviewed: 08/02/2018  Elsevier Patient Education © 2020 Elsevier Inc.      Discharge Diet: Diabetic regular texture     Discharge Liquids: Thin  Discharge Bowel Function: Continent  Please contact your primary care physician for any changes in bowel habits.  Discharge Bowel Program:    Discharge Bladder Function: Continent  Discharge Urinary Devices: None      Nursing Discharge Plan:   Influenza Vaccine Indication:  (WIFE CONFIRMED FLU SHOT)      Stroke Prevention  Some medical conditions and lifestyle choices can lead to a higher risk for a stroke. You can help to prevent a stroke by making nutrition, lifestyle, and other changes.  What nutrition changes can be made?    · Eat healthy foods.  ? Choose foods  that are high in fiber. These include:  § Fresh fruits.  § Fresh vegetables.  § Whole grains.  ? Eat at least 5 or more servings of fruits and vegetables each day. Try to fill half of your plate at each meal with fruits and vegetables.  ? Choose lean protein foods. These include:  § Lowfat (lean) cuts of meat.  § Chicken without skin.  § Fish.  § Tofu.  § Beans.  § Nuts.  ? Eat low-fat dairy products.  ? Avoid foods that:  § Are high in salt (sodium).  § Have saturated fat.  § Have trans fat.  § Have cholesterol.  § Are processed.  § Are premade.  · Follow eating guidelines as told by your doctor. These may include:  ? Reducing how many calories you eat and drink each day.  ? Limiting how much salt you eat or drink each day to 1,500 milligrams (mg).  ? Using only healthy fats for cooking. These include:  § Olive oil.  § Canola oil.  § Sunflower oil.  ? Counting how many carbohydrates you eat and drink each day.  What lifestyle changes can be made?  · Try to stay at a healthy weight. Talk to your doctor about what a good weight is for you.  · Get at least 30 minutes of moderate physical activity at least 5 days a week. This can include:  ? Fast walking.  ? Biking.  ? Swimming.  · Do not use any products that have nicotine or tobacco. This includes cigarettes and e-cigarettes. If you need help quitting, ask your doctor. Avoid being around tobacco smoke in general.  · Limit how much alcohol you drink to no more than 1 drink a day for nonpregnant women and 2 drinks a day for men. One drink equals 12 oz of beer, 5 oz of wine, or 1½ oz of hard liquor.  · Do not use drugs.  · Avoid taking birth control pills. Talk to your doctor about the risks of taking birth control pills if:  ? You are over 35 years old.  ? You smoke.  ? You get migraines.  ? You have had a blood clot.  What other changes can be made?  · Manage your cholesterol.  ? It is important to eat a healthy diet.  ? If your cholesterol cannot be managed through  "your diet, you may also need to take medicines. Take medicines as told by your doctor.  · Manage your diabetes.  ? It is important to eat a healthy diet and to exercise regularly.  ? If your blood sugar cannot be managed through diet and exercise, you may need to take medicines. Take medicines as told by your doctor.  · Control your high blood pressure (hypertension).  ? Try to keep your blood pressure below 130/80. This can help lower your risk of stroke.  ? It is important to eat a healthy diet and to exercise regularly.  ? If your blood pressure cannot be managed through diet and exercise, you may need to take medicines. Take medicines as told by your doctor.  ? Ask your doctor if you should check your blood pressure at home.  ? Have your blood pressure checked every year. Do this even if your blood pressure is normal.  · Talk to your doctor about getting checked for a sleep disorder. Signs of this can include:  ? Snoring a lot.  ? Feeling very tired.  · Take over-the-counter and prescription medicines only as told by your doctor. These may include aspirin or blood thinners (antiplatelets or anticoagulants).  · Make sure that any other medical conditions you have are managed.  Where to find more information  · American Stroke Association: www.strokeassociation.org  · National Stroke Association: www.stroke.org  Get help right away if:  · You have any symptoms of stroke. \"BE FAST\" is an easy way to remember the main warning signs:  ? B - Balance. Signs are dizziness, sudden trouble walking, or loss of balance.  ? E - Eyes. Signs are trouble seeing or a sudden change in how you see.  ? F - Face. Signs are sudden weakness or loss of feeling of the face, or the face or eyelid drooping on one side.  ? A - Arms. Signs are weakness or loss of feeling in an arm. This happens suddenly and usually on one side of the body.  ? S - Speech. Signs are sudden trouble speaking, slurred speech, or trouble understanding what people " say.  ? T - Time. Time to call emergency services. Write down what time symptoms started.  · You have other signs of stroke, such as:  ? A sudden, very bad headache with no known cause.  ? Feeling sick to your stomach (nausea).  ? Throwing up (vomiting).  ? Jerky movements you cannot control (seizure).  These symptoms may represent a serious problem that is an emergency. Do not wait to see if the symptoms will go away. Get medical help right away. Call your local emergency services (911 in the [place] Do not drive yourself to the hospital.  Summary  · You can prevent a stroke by eating healthy, exercising, not smoking, drinking less alcohol, and treating other health problems, such as diabetes, high blood pressure, or high cholesterol.  · Do not use any products that contain nicotine or tobacco, such as cigarettes and e-cigarettes.  · Get help right away if you have any signs or symptoms of a stroke.  This information is not intended to replace advice given to you by your health care provider. Make sure you discuss any questions you have with your health care provider.  Document Released: 06/18/2013 Document Revised: 02/13/2020 Document Reviewed: 03/21/2018  [employer] Patient Education © 2020 [employer]        Suicidal Feelings: How to Help Yourself  Suicide is when you end your own life. There are many things you can do to help yourself feel better when struggling with these feelings. Many services and people are available to support you and others who struggle with similar feelings.   If you ever feel like you may hurt yourself or others, or have thoughts about taking your own life, get help right away. To get help:  · Call your local emergency services (911 in the [place]  · [facility] health and human services helpline (211 in the [place]  · Go to your nearest emergency department.  · Call a suicide hotline to speak with a trained counselor. The following suicide hotlines are available in the [place]:  ? 7-350-939-TALK (1-943.570.2799).  ? 4-505-WFEVBOE (1-605.811.3436).  ? 1-890.152.4539. This is a hotline for French speakers.  ? 1-618.167.4885. This is a hotline for TTY users.  ? 5-707-4-U-MARY (1-160.294.5322). This is a hotline for lesbian, forrester, bisexual, transgender, or questioning youth.  ? For a list of hotlines in [place], visit www.suicide.org/hotlines/international/nwryyu-fihqzxx-ttbrcobs.html  · Contact a crisis center or a local suicide prevention center. To find a crisis center or suicide prevention center:  ? Call your local hospital, clinic, community service organization, mental health center, social service provider, or health department. Ask for help with connecting to a crisis center.  ? For a list of crisis centers in the [place], visit: suicidepreventionlifeline.org  ? For a list of crisis centers in [place], visit: suicideprevention.ca  How to help yourself feel better    · Promise yourself that you will not do anything extreme when you have suicidal feelings. Remember, there is hope. Many people have gotten through suicidal thoughts and feelings, and you can too. If you have had these feelings before, remind yourself that you can get through them again.  · Let family, friends, teachers, or counselors know how you are feeling. Try not to separate yourself from those who care about you and want to help you. Talk with someone every day, even if you do not feel sociable. Face-to-face conversation is best to help them understand your feelings.  · Contact a mental health care provider and work with this person regularly.  · Make a safety plan that you can follow during a crisis. Include phone numbers of suicide prevention hotlines, mental health professionals, and trusted friends and family members you can call during an emergency. Save these numbers on your phone.  · If you are thinking of taking a lot of medicine, give your medicine to someone who can give it to you as  prescribed. If you are on antidepressants and are concerned you will overdose, tell your health care provider so that he or she can give you safer medicines.  · Try to stick to your routines. Follow a schedule every day. Make self-care a priority.  · Make a list of realistic goals, and cross them off when you achieve them. Accomplishments can give you a sense of worth.  · Wait until you are feeling better before doing things that you find difficult or unpleasant.  · Do things that you have always enjoyed to take your mind off your feelings. Try reading a book, or listening to or playing music. Spending time outside, in nature, may help you feel better.  Follow these instructions at home:    · Visit your primary health care provider every year for a checkup.  · Work with a mental health care provider as needed.  · Eat a well-balanced diet, and eat regular meals.  · Get plenty of rest.  · Exercise if you are able. Just 30 minutes of exercise each day can help you feel better.  · Take over-the-counter and prescription medicines only as told by your health care provider. Ask your mental health care provider about the possible side effects of any medicines you are taking.  · Do not use alcohol or drugs, and remove these substances from your home.  · Remove weapons, poisons, knives, and other deadly items from your home.  General recommendations  · Keep your living space well lit.  · When you are feeling well, write yourself a letter with tips and support that you can read when you are not feeling well.  · Remember that life's difficulties can be sorted out with help. Conditions can be treated, and you can learn behaviors and ways of thinking that will help you.  Where to find more information  · National Suicide Prevention Lifeline: www.suicidepreventionlifeline.org  · Hopeline: www.hopeline.com  · American Foundation for Suicide Prevention: www.afsp.org  · The Bashir Project (for lesbian, forrester, bisexual, transgender, or  questioning youth): www.thetrevorproject.org  Contact a health care provider if:  · You feel as though you are a burden to others.  · You feel agitated, angry, vengeful, or have extreme mood swings.  · You have withdrawn from family and friends.  Get help right away if:  · You are talking about suicide or wishing to die.  · You start making plans for how to commit suicide.  · You feel that you have no reason to live.  · You start making plans for putting your affairs in order, saying goodbye, or giving your possessions away.  · You feel guilt, shame, or unbearable pain, and it seems like there is no way out.  · You are frequently using drugs or alcohol.  · You are engaging in risky behaviors that could lead to death.  If you have any of these symptoms, get help right away. Call emergency services, go to your nearest emergency department or crisis center, or call a suicide crisis helpline.  Summary  · Suicide is when you take your own life.  · Promise yourself that you will not do anything extreme when you have suicidal feelings.  · Let family, friends, teachers, or counselors know how you are feeling.  · Get help right away if you feel as though life is getting too tough to handle and you are thinking about suicide.  This information is not intended to replace advice given to you by your health care provider. Make sure you discuss any questions you have with your health care provider.  Document Released: 06/23/2004 Document Revised: 04/09/2020 Document Reviewed: 07/31/2018  [employer] Patient Education © 2020 [employer]      Case Management Discharge Instructions:   Discharge Location:    Agency Name/Address/Phone:    Home Health:    Outpatient Services:    DME Provider/Phone:    Medical Equipment Ordered:    Prescription Faxed to:        Discharge Medication Instructions:  Below are the medications your physician expects you to take upon discharge:      Physical Therapy Discharge Instructions for Manan Haines  Traci    3/8/2022    Level of Assist Required for Ambulation: Assist for Balance on Flat Surfaces,Assist for Balance on Curbs,Assist for Balance on Stairs  Distance Patient May Ambulate: household  Device Recommended for Ambulation: Front-Wheeled Walker  Level of Assist Required for Transfers: Supervision  Device Recommended for Transfers: Front-Wheeled Walker  Home Exercise Program: Refer to Home Exercise Program Handout for Details  Prosthesis / Orthosis Recommendation / Location: Left Leg    Work hard and be safe! Good luck to you Manan! Tammi    Occupational Therapy Discharge Instructions for Israel Moorerandal    3/11/2022    Level of Assist Required for Eating: Able to Complete Eating without Assist  Level of Assist Required for Grooming: Able to Complete Grooming without Assist  Level of Assist Required for Dressing: Requires Supervision with Dressing  Level of Assist Required for Toileting: Able to Complete Toileting without Assist  Level of Assist Required for Toilet Transfer: Requires Supervision with Toilet Transfer  Equipment for Toilet Transfer: Grab Bars by Toilet  Level of Assist Required for Bathing: Able to Complete Bathing without Assist  Equipment for Bathing: Grab Bars in Tub / Shower,Shower Chair  Level of Assist Required for Shower Transfer: Requires Supervision with Shower Transfer  Equipment for Shower Transfer: Grab Bars in Tub / Shower,Shower Chair  Level of Assist Required for Home Mgmt: Requires Supervision with Home Management  Level of Assist Required for Meal Prep: Requires Supervision with Meal Preparation  Driving: May not Drive, Please Contact Physician for Further Information  Home Exercise Program: Refer to Home Exercise Program Handout for Details    It was wonderful working with you Manan! Keep up the hard work at home! -Jami GONSALES/L

## 2022-03-12 NOTE — CARE PLAN
Problem: Bladder / Voiding  Goal: Patient will establish and maintain regular urinary output  Outcome: Progressing   Pt voiding w/o difficulty. Will continue to monitor.   Problem: Nutrition  Goal: Patient's nutritional and fluid intake will be adequate or improve  Outcome: Progressing  Adequate intake.   The patient is Stable - Low risk of patient condition declining or worsening    Shift Goals  Clinical Goals: safety, pain control, and rest  Patient Goals: Safety    Progress made toward(s) clinical / shift goals:  stable VS, rest, and safety    Patient is not progressing towards the following goals:

## 2022-03-16 ENCOUNTER — OFFICE VISIT (OUTPATIENT)
Dept: NEUROLOGY | Facility: MEDICAL CENTER | Age: 57
End: 2022-03-16
Attending: NURSE PRACTITIONER
Payer: COMMERCIAL

## 2022-03-16 ENCOUNTER — OFFICE VISIT (OUTPATIENT)
Dept: MEDICAL GROUP | Facility: OTHER | Age: 57
End: 2022-03-16
Payer: COMMERCIAL

## 2022-03-16 ENCOUNTER — HOSPITAL ENCOUNTER (OUTPATIENT)
Dept: LAB | Facility: MEDICAL CENTER | Age: 57
End: 2022-03-16
Attending: NURSE PRACTITIONER
Payer: COMMERCIAL

## 2022-03-16 VITALS
SYSTOLIC BLOOD PRESSURE: 106 MMHG | TEMPERATURE: 96.8 F | HEIGHT: 72 IN | HEART RATE: 73 BPM | OXYGEN SATURATION: 100 % | BODY MASS INDEX: 23 KG/M2 | WEIGHT: 169.8 LBS | RESPIRATION RATE: 16 BRPM | DIASTOLIC BLOOD PRESSURE: 70 MMHG

## 2022-03-16 VITALS
HEIGHT: 72 IN | WEIGHT: 169.75 LBS | SYSTOLIC BLOOD PRESSURE: 108 MMHG | OXYGEN SATURATION: 99 % | RESPIRATION RATE: 12 BRPM | DIASTOLIC BLOOD PRESSURE: 64 MMHG | BODY MASS INDEX: 22.99 KG/M2 | TEMPERATURE: 97.6 F | HEART RATE: 79 BPM

## 2022-03-16 DIAGNOSIS — E11.42 DIABETIC POLYNEUROPATHY ASSOCIATED WITH TYPE 2 DIABETES MELLITUS (HCC): ICD-10-CM

## 2022-03-16 DIAGNOSIS — Z79.4 TYPE 2 DIABETES MELLITUS WITH OTHER SPECIFIED COMPLICATION, WITH LONG-TERM CURRENT USE OF INSULIN (HCC): ICD-10-CM

## 2022-03-16 DIAGNOSIS — I25.5 ISCHEMIC CARDIOMYOPATHY: ICD-10-CM

## 2022-03-16 DIAGNOSIS — I63.9 RIGHT SIDED CEREBRAL HEMISPHERE CEREBROVASCULAR ACCIDENT (CVA) (HCC): ICD-10-CM

## 2022-03-16 DIAGNOSIS — E78.2 MIXED HYPERLIPIDEMIA: ICD-10-CM

## 2022-03-16 DIAGNOSIS — E11.69 TYPE 2 DIABETES MELLITUS WITH OTHER SPECIFIED COMPLICATION, WITH LONG-TERM CURRENT USE OF INSULIN (HCC): ICD-10-CM

## 2022-03-16 DIAGNOSIS — I69.30 LATE EFFECT OF STROKE: ICD-10-CM

## 2022-03-16 DIAGNOSIS — F41.9 ANXIETY: ICD-10-CM

## 2022-03-16 DIAGNOSIS — E11.69 TYPE 2 DIABETES MELLITUS WITH OTHER SPECIFIED COMPLICATION, WITHOUT LONG-TERM CURRENT USE OF INSULIN (HCC): ICD-10-CM

## 2022-03-16 DIAGNOSIS — Z79.899 DRUG THERAPY: ICD-10-CM

## 2022-03-16 DIAGNOSIS — S88.119A BELOW-KNEE AMPUTATION (HCC): ICD-10-CM

## 2022-03-16 LAB
CLOSURE TME COLL+ADP BLD: 118 SEC (ref 64–123)
CLOSURE TME COLL+EPINEP BLD: >193 SEC (ref 72–193)

## 2022-03-16 PROCEDURE — 99211 OFF/OP EST MAY X REQ PHY/QHP: CPT | Performed by: NURSE PRACTITIONER

## 2022-03-16 PROCEDURE — 99417 PROLNG OP E/M EACH 15 MIN: CPT | Performed by: NURSE PRACTITIONER

## 2022-03-16 PROCEDURE — 99215 OFFICE O/P EST HI 40 MIN: CPT | Performed by: NURSE PRACTITIONER

## 2022-03-16 PROCEDURE — 85576 BLOOD PLATELET AGGREGATION: CPT | Mod: 91

## 2022-03-16 PROCEDURE — 36415 COLL VENOUS BLD VENIPUNCTURE: CPT

## 2022-03-16 PROCEDURE — 99215 OFFICE O/P EST HI 40 MIN: CPT | Performed by: FAMILY MEDICINE

## 2022-03-16 RX ORDER — CITALOPRAM HYDROBROMIDE 10 MG/1
10 TABLET ORAL DAILY
Qty: 90 TABLET | Refills: 1 | Status: SHIPPED | OUTPATIENT
Start: 2022-03-16 | End: 2022-06-16

## 2022-03-16 RX ORDER — ATORVASTATIN CALCIUM 80 MG/1
80 TABLET, FILM COATED ORAL DAILY
Qty: 90 TABLET | Refills: 0 | Status: SHIPPED | OUTPATIENT
Start: 2022-03-16 | End: 2022-06-16 | Stop reason: SDUPTHER

## 2022-03-16 ASSESSMENT — ENCOUNTER SYMPTOMS
SHORTNESS OF BREATH: 0
FEVER: 0
DEPRESSION: 0
SENSORY CHANGE: 0
SPEECH CHANGE: 0
FALLS: 0
FOCAL WEAKNESS: 1
NERVOUS/ANXIOUS: 1
BLURRED VISION: 0
DIZZINESS: 0
BLOOD IN STOOL: 0
BRUISES/BLEEDS EASILY: 0
TINGLING: 0
PALPITATIONS: 0
COUGH: 0
DOUBLE VISION: 0
HEADACHES: 0

## 2022-03-16 ASSESSMENT — FIBROSIS 4 INDEX
FIB4 SCORE: 1.93
FIB4 SCORE: 1.93

## 2022-03-16 NOTE — PROGRESS NOTES
Subjective   HPI    Manan Aviles is a 56  y.o. right handed male who presents to [facility] for evaluation of infarct of right posterior limb of internal capsule, with multiple remote lacunes.     He presented to [facility] on 2/17/2022 with complaints of  LUE/LLE weakness. NIHSS 2. He had been on DAPT with ASA and Plavix for CAD and reports compliance  I last saw him in 7/19/2021 for possible TIA.     PMH includes CAD, DMII, cardiomyopathy, PAD, HLD, diabetic polyneuropathy, left BKA, depression, stroke in 2018.   Social History:  Denies history of tobacco, alcohol or drug use., he is not working now, he used to work at a gas station.       He is here today with his wife. He is not getting any therapy after his rehab stay (he was discharged from Rehab on 3/12).  His wife would like him to get into therapy as soon as possible, she was told next available at [facility] was in mid-May, she asked that we send the referral somewhere else. He is walking with a walker at home, no falls. He states he is walking very slowly, his wife helps him with dressing and toileting.  He has been having a lot of problems with worrying since the stroke, states he thinks all of the time about what will happen to his wife and his children.     Review of Systems   Constitutional: Negative for fever.   HENT: Negative for nosebleeds.    Eyes: Negative for blurred vision and double vision.   Respiratory: Negative for cough and shortness of breath.    Cardiovascular: Negative for chest pain and palpitations.   Gastrointestinal: Negative for blood in stool.   Genitourinary: Negative for hematuria.   Musculoskeletal: Negative for falls.   Skin: Negative for rash.   Neurological: Positive for focal weakness. Negative for dizziness, tingling, sensory change, speech change and headaches.   Endo/Heme/Allergies: Does not bruise/bleed easily.   Psychiatric/Behavioral: Negative for depression. The patient is  nervous/anxious.            Current Outpatient Medications on File Prior to Visit   Medication Sig Dispense Refill   • clopidogrel (PLAVIX) 75 MG Tab Take 1 Tablet by mouth every day. 30 Tablet 2   • gabapentin (NEURONTIN) 300 MG Cap Take 1 Capsule by mouth every evening. 90 Capsule 2   • glipiZIDE (GLUCOTROL) 5 MG Tab Take 0.5 Tablets by mouth 2 times daily, before breakfast and dinner. 60 Tablet 2   • metFORMIN (GLUCOPHAGE) 500 MG Tab Take 1 Tablet by mouth 2 times a day with meals. 180 Tablet 2   • metoprolol SR (TOPROL XL) 25 MG TABLET SR 24 HR Take 2 Tablets by mouth 2 times a day. (Patient taking differently: Take 50 mg by mouth 2 times a day. Indications: takes 1/2 in am 1/2 pm) 120 Tablet 2   • vitamin D3 2000 UNIT Tab Take 1 Tablet by mouth every day. 60 Tablet    • multivitamin (THERAGRAN) Tab Take 1 Tablet by mouth every day.     • aspirin 81 MG EC tablet Take 1 tablet by mouth every day. 30 tablet 2     No current facility-administered medications on file prior to visit.         Objective        I personally reviewed imaging below and agree with the findings  MRI brain 2/18/2022  Acute infarct in the right posterior limb internal capsule and adjacent basal ganglia.     Multiple remote lacunar infarcts involving the bilateral cerebral hemispheric deep white matter. These are more numerous than on the previous study from 2018.     Interval progression of chronic deep white matter microvascular ischemic changes.     Gradient echo hypointense lesions along the medial right temporal cortex involving the hippocampus and the medial right parietal region may represent focal microhemorrhages related to hypertension.    CTA head 2/17/2022  No thrombosis is seen within the [place].    CTA neck 2/17/2022  Atherosclerosis of the bilateral carotid bifurcations/bulbs, left greater than right, with less than 50% stenosis.     Patent vertebral arteries.    TTE 2/18/2022:  LVEF 60%, bubble study incomplete, LA  size WNL, BRYON 12.     Labs:  Creat 0.74, LDL 65, A1C 8.2,    /64 (BP Location: Right arm, Patient Position: Sitting, BP Cuff Size: Adult)   Pulse 79   Temp 36.4 °C (97.6 °F) (Temporal)   Resp 12   Ht 1.829 m (6')   Wt 77 kg (169 lb 12.1 oz)   SpO2 99%   BMI 23.02 kg/m²       PHYSICAL ASSESSMENT  Constitutional:  Alert, no apparent distress,  Psych:   mood and affect WNL  Muskuloskeletal:  strength 5/5 RUE/RLE, no drift.  LUE 4/5 with slight drift, LLE 4+/5 with slight drift   NEUROLOGICAL ASSESSMENT  Oriented X 4, speech fluent, naming and memory intact  CN II: Visual fields are full to confrontation. Fundoscopic exam is normal with sharp discs and no vascular changes. Pupils are 3mm and briskly reactive to light.   CN III: IV, VI  EOMs intact, no ptosis  CN V: Facial sensation is intact to pinprick in all 3 divisions bilaterally. Corneal responses are intact.  CN VII: Face is symmetric with normal eye closure and smile.  CN VIII Hearing is normal to rubbing fingers  CN IX, X: Palate elevates symmetrically. Phonation is normal.  CN XI: Head turning and shoulder shrug are intact  CN XII: Tongue is midline with normal movements and no atrophy.                           Sensation to PP equal bilaterally                 Ataxia LUE slightly out of proportion to weakness.                               Biceps,brachioradialis, tricep 1+ bilaterally, patellar reflexes quiet      Cardiovascular:    S1S2, no abnormal rhythm auscultated, no peripheral edema  Neck:                     No carotid bruits noted   Pulmonary:            Respirations easy, lungs clear to auscultation all fields.     Skin:                     No obvious rashes.    Iniital NIHSS  2      Current NIHSS    1a. LOC: 0  1b. LOC Questions: 0  1c. LOC Commands: 0  2. Best Gaze:0  3. Visual Fields: 0  4. Facial Paresis: 0  5a. Motor arm left: 1  5b. Motor arm right: 0  6a. Motor leg left: 1  6b. Motor leg right: 0  7. Sensory: 0  8. Best Language:  0  9. Limb Ataxia: 1  10. Dysarthria: 0  11. Extinction/Inattention: 0    Total Score Current  3          Current mRS  4        Assessment & Plan     1. Late effect of stroke  Small vessel infarct of right internal capsule secondary to diabetes, HLD.   Plan:  Presumed Mechanism by [place]:  __  Large Artery Atherosclerosis  XX__  Small Vessel (lacunar)  __   Cardioembolic  __   Other (Sickle cell, vasculitis, hypercoagulable)  __   Unknown  __   ESUS    Stroke risk factors include HLD, DM    Blood pressure goal less than 130/80, currently 108/64      He may not be a responder to Plavix, I ordered a P2Y12, good responders to Plavix that are compliant will typically have levels less than 185, I have asked that the lab send this out if needed, it is very important that we get this lab. H      Continue Aspirin 81mg PO daily and Plavix 75mg per cardiology. discussed side effects, signs of bleeding    I  2. Type 2 diabetes mellitus with other specified complication, without long-term current use of insulin (HCC)  Goal A1C less than 7.0, current 8.2     3. Mixed hyperlipidemia    LDL goal < 70, current 65, continue Atorvastatin 80mg, discussed medication side effects, will need follow up with primary care evaluate liver function and intervals and refill  Exercise at least 30 minutes daily, avoid red meat, fried foods, butter, cheese.   Eat 5-6 servings of vegetables and fruits daily, choose lean white meat without skin (chicken, turkey, white fish)--baked, broiled or grilled.       4. Diabetic polyneuropathy associated with type 2 diabetes mellitus (HCC)       He is currently on 300mg Gabapentin QHS, states the neuropathy doesn't bother him much.  He should hold this for a while and see if he feels better, if he does, we can restart 100mg and slowly increase.     5. Anxiety        Start Citalopram 10mg by mouth every day, this is to help prevent anxiety, it needs to be taken once a day every day, it may take 5-6 weeks before  you notice a difference.     f any new signs of stroke:  sudden weakness, numbness, speech difficulty (slurring or difficulty finding words), imbalance, incoordination, worse headache of life or vision loss occur, call 911.      Take all medications as prescribed unless instructed by your provider.      I spent a total of 61 minutes caring for patient,  my time includes counseling, review of systems, HPI and assessment, review of images, labs and testing as above.  I reviewed the hospital records, PMH, social and family history.   I have counseled patient on stroke prevention strategies, stroke symptoms and mimics.  Diet and exercise modifications.  We discussed medication side effects and instructions.       I have provided patient a written personalized stroke prevention plan, it is filed under the media tab under ‘[facility]”.      Follow up in 3 months.

## 2022-03-16 NOTE — PATIENT INSTRUCTIONS
If any new signs of stroke:  sudden weakness, numbness, speech difficulty (slurring or difficulty finding words), imbalance, incoordination, worse headache of life or vision loss occur, call 911.      Take all medications as prescribed unless instructed by your provider.    Stop Gabapentin for now, if you feel less tired with this, I can restart the medication at 100mg and slowly work up, please send me a message on MyChart.     Start Citalopram 10mg by mouth every day, this is to help prevent anxiety, it needs to be taken once a day every day, it may take 5-6 weeks before you notice a difference.         Call again for endocrine apt (diabetes)     [place] [facility]  [address] [facility], [address]  [facility] [address]  Phone: [phone]        Follow up with Rehab doctor:    Referral information sent to the following:  Physical Medicine and Rehab     [facility]  [facility], [address]  [facility] [address]  Phone: [phone]  Fax: [phone]        Stroke Prevention  Some medical conditions and lifestyle choices can lead to a higher risk for a stroke. You can help to prevent a stroke by making nutrition, lifestyle, and other changes.  What nutrition changes can be made?    · Eat healthy foods.  ? Choose foods that are high in fiber. These include:  § Fresh fruits.  § Fresh vegetables.  § Whole grains.  ? Eat at least 5 or more servings of fruits and vegetables each day. Try to fill half of your plate at each meal with fruits and vegetables.  ? Choose lean protein foods. These include:  § Lowfat (lean) cuts of meat.  § Chicken without skin.  § Fish.  § Tofu.  § Beans.  § Nuts.  ? Eat low-fat dairy products.  ? Avoid foods that:  § Are high in salt (sodium).  § Have saturated fat.  § Have trans fat.  § Have cholesterol.  § Are processed.  § Are premade.  · Follow eating guidelines as told by your doctor. These may include:  ? Reducing how many calories you eat and drink each day.  ? Limiting how much  salt you eat or drink each day to 1,500 milligrams (mg).  ? Using only healthy fats for cooking. These include:  § Olive oil.  § Canola oil.  § Sunflower oil.  ? Counting how many carbohydrates you eat and drink each day.  What lifestyle changes can be made?  · Try to stay at a healthy weight. Talk to your doctor about what a good weight is for you.  · Get at least 30 minutes of moderate physical activity at least 5 days a week. This can include:  ? Fast walking.  ? Biking.  ? Swimming.  · Do not use any products that have nicotine or tobacco. This includes cigarettes and e-cigarettes. If you need help quitting, ask your doctor. Avoid being around tobacco smoke in general.  · Limit how much alcohol you drink to no more than 1 drink a day for nonpregnant women and 2 drinks a day for men. One drink equals 12 oz of beer, 5 oz of wine, or 1½ oz of hard liquor.  · Do not use drugs.  · Avoid taking birth control pills. Talk to your doctor about the risks of taking birth control pills if:  ? You are over 35 years old.  ? You smoke.  ? You get migraines.  ? You have had a blood clot.  What other changes can be made?  · Manage your cholesterol.  ? It is important to eat a healthy diet.  ? If your cholesterol cannot be managed through your diet, you may also need to take medicines. Take medicines as told by your doctor.  · Manage your diabetes.  ? It is important to eat a healthy diet and to exercise regularly.  ? If your blood sugar cannot be managed through diet and exercise, you may need to take medicines. Take medicines as told by your doctor.  · Control your high blood pressure (hypertension).  ? Try to keep your blood pressure below 130/80. This can help lower your risk of stroke.  ? It is important to eat a healthy diet and to exercise regularly.  ? If your blood pressure cannot be managed through diet and exercise, you may need to take medicines. Take medicines as told by your doctor.  ? Ask your doctor if you should  "check your blood pressure at home.  ? Have your blood pressure checked every year. Do this even if your blood pressure is normal.  · Talk to your doctor about getting checked for a sleep disorder. Signs of this can include:  ? Snoring a lot.  ? Feeling very tired.  · Take over-the-counter and prescription medicines only as told by your doctor. These may include aspirin or blood thinners (antiplatelets or anticoagulants).  · Make sure that any other medical conditions you have are managed.  Where to find more information  · American Stroke Association: www.strokeassociation.org  · National Stroke Association: www.stroke.org  Get help right away if:  · You have any symptoms of stroke. \"BE FAST\" is an easy way to remember the main warning signs:  ? B - Balance. Signs are dizziness, sudden trouble walking, or loss of balance.  ? E - Eyes. Signs are trouble seeing or a sudden change in how you see.  ? F - Face. Signs are sudden weakness or loss of feeling of the face, or the face or eyelid drooping on one side.  ? A - Arms. Signs are weakness or loss of feeling in an arm. This happens suddenly and usually on one side of the body.  ? S - Speech. Signs are sudden trouble speaking, slurred speech, or trouble understanding what people say.  ? T - Time. Time to call emergency services. Write down what time symptoms started.  · You have other signs of stroke, such as:  ? A sudden, very bad headache with no known cause.  ? Feeling sick to your stomach (nausea).  ? Throwing up (vomiting).  ? Jerky movements you cannot control (seizure).  These symptoms may represent a serious problem that is an emergency. Do not wait to see if the symptoms will go away. Get medical help right away. Call your local emergency services (911 in the [place] Do not drive yourself to the hospital.  Summary  · You can prevent a stroke by eating healthy, exercising, not smoking, drinking less alcohol, and treating other health problems, such as diabetes, " high blood pressure, or high cholesterol.  · Do not use any products that contain nicotine or tobacco, such as cigarettes and e-cigarettes.  · Get help right away if you have any signs or symptoms of a stroke.  This information is not intended to replace advice given to you by your health care provider. Make sure you discuss any questions you have with your health care provider.  Document Released: 06/18/2013 Document Revised: 02/13/2020 Document Reviewed: 03/21/2018  Elsevier Patient Education © 2020 [employer]

## 2022-03-16 NOTE — PROGRESS NOTES
[facility]     PATIENT ID:  NAME:  Israel Aviles  MRN:               [id number]  YOB: 1965    Date: 1:25 PM      CC:  Follows up on recent stroke     HPI: Israel Aviles is a 56 y.o. male who presented with stroke on 2/24/22.    Problem   Right Sided Cerebral Hemisphere Cerebrovascular Accident (Cva) (Hcc)    He was hospitalized for acute stroke on 2/24/22 with left sided deficits.  Was discharged from Rehab unit on 3/12/22.  Being followed by Neurology who saw him this morning.  They started hm on Citalopram for mood.      Type 2 Diabetes Mellitus With Other Specified Complication (Hcc)    He is interested in subcutaneous glucose monitoring.  Has had difficult to control diabetes.  Uses insulin.  Has a left lower extremity amputation.      Ischemic Cardiomyopathy    The higher dosage of Toprol XL makes him dizzy.  His pressure has been mostly low and is 106/70 today.       Below-Knee Amputation (Hcc)    Needs a new socket for his left lower extremity prosthesis.  He uses [facility].          REVIEW OF SYSTEMS:   Ten systems reviewed and were negative except as noted in the HPI.                PROBLEM LIST  Patient Active Problem List   Diagnosis   • Acute ischemic stroke (HCC)   • Uncontrolled type 2 diabetes mellitus with hyperglycemia (HCC)   • Hyperlipidemia   • Wound infection   • PVD (peripheral vascular disease) ([facility])   • Diabetic foot ulcer (HCC)   • Diabetic polyneuropathy associated with type 2 diabetes mellitus (HCC)   • Pressure injury of deep tissue of left foot   • Diabetic ulcer of toe of left foot associated with type 2 diabetes mellitus, with necrosis of muscle ([facility])   • Osteomyelitis of left foot ([facility])   • Diabetic foot ([facility])   • Vitamin D deficiency   • CAD (coronary artery disease)   • HTN (hypertension)   • Orthostatic hypotension   • Non-healing wound of right heel   • Below-knee amputation (HCC)   • Adjustment disorder with depressed mood   • HFrEF (heart  failure with reduced ejection fraction) (HCC)   • Ischemic cardiomyopathy   • Body mass index (BMI) 23.0-23.9, adult   • Type 2 diabetes mellitus with other specified complication (HCC)   • TIA (transient ischemic attack)   • 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)   • Dyslipidemia due to type 2 diabetes mellitus (HCC)   • Colon cancer screening declined   • Neurological deficit present   • Stroke-like symptoms   • Anemia   • Right sided cerebral hemisphere cerebrovascular accident (CVA) (HCC)   • Late effect of stroke   • Drug therapy   • Anxiety        PAST SURGICAL HISTORY:  Past Surgical History:   Procedure Laterality Date   • KNEE AMPUTATION BELOW Left 12/20/2019    Procedure: AMPUTATION, BELOW KNEE;  Surgeon: Koby Zhao M.D.;  Location: SURGERY [facility];  Service: Orthopedics   • Z CARDIAC CATH  12/16/2019    multi-vessel disease   • IRRIGATION & DEBRIDEMENT ORTHO Left 6/24/2019    Procedure: IRRIGATION AND DEBRIDEMENT, WOUND-FOOT, BIOLOGIC PLACEMENT, WOUND VAC PLACEMENT;  Surgeon: Charles Chopra M.D.;  Location: SURGERY [facility];  Service: Orthopedics       FAMILY HISTORY:  Family History   Problem Relation Age of Onset   • Diabetes Mother    • Diabetes Father        SOCIAL HISTORY:   Social History     Tobacco Use   • Smoking status: Never Smoker   • Smokeless tobacco: Never Used   Substance Use Topics   • Alcohol use: No       ALLERGIES:  No Known Allergies    OUTPATIENT MEDICATIONS:    Current Outpatient Medications:   •  atorvastatin (LIPITOR) 80 MG tablet, Take 1 Tablet by mouth every day for 90 days., Disp: 90 Tablet, Rfl: 0  •  citalopram (CELEXA) 10 MG tablet, Take 1 Tablet by mouth every day., Disp: 90 Tablet, Rfl: 1  •  clopidogrel (PLAVIX) 75 MG Tab, Take 1 Tablet by mouth every day., Disp: 30 Tablet, Rfl: 2  •  glipiZIDE (GLUCOTROL) 5 MG Tab, Take 0.5 Tablets by mouth 2 times daily, before breakfast and dinner., Disp: 60 Tablet, Rfl: 2  •   metFORMIN (GLUCOPHAGE) 500 MG Tab, Take 1 Tablet by mouth 2 times a day with meals., Disp: 180 Tablet, Rfl: 2  •  vitamin D3 2000 UNIT Tab, Take 1 Tablet by mouth every day., Disp: 60 Tablet, Rfl:   •  multivitamin (THERAGRAN) Tab, Take 1 Tablet by mouth every day., Disp: , Rfl:   •  aspirin 81 MG EC tablet, Take 1 tablet by mouth every day., Disp: 30 tablet, Rfl: 2  •  gabapentin (NEURONTIN) 300 MG Cap, Take 1 Capsule by mouth every evening. (Patient not taking: Reported on 3/16/2022), Disp: 90 Capsule, Rfl: 2  •  metoprolol SR (TOPROL XL) 25 MG TABLET SR 24 HR, Take 2 Tablets by mouth 2 times a day. (Patient taking differently: Take 50 mg by mouth 2 times a day. Indications: takes 1/2 in am 1/2 pm), Disp: 120 Tablet, Rfl: 2    PHYSICAL EXAM:  Vitals:    03/16/22 1042   BP: 106/70   BP Location: Right arm   Patient Position: Sitting   BP Cuff Size: Adult   Pulse: 73   Resp: 16   Temp: 36 °C (96.8 °F)   TempSrc: Temporal   SpO2: 100%   Weight: 77 kg (169 lb 12.8 oz)   Height: 1.829 m (6')       General: Pt resting in NAD, cooperative   Skin:  Pink, warm and dry.  HEENT: NC/AT. EOMI.  Lungs:  Symmetrical.  CTAB, good air movement   Cardiovascular:  S1/S2 RRR   Abdomen:  Abdomen is soft, nontender  Extremities:  Full range of motion. Left BKA and wearing his prosthesis.   CNS:  Muscle tone is normal. No gross focal neurologic deficits      ASSESSMENT/PLAN:   56 y.o. male     Problem List Items Addressed This Visit     Below-knee amputation (HCC)     New prosthesis part ordered for his left leg.          Relevant Orders    DME Other    Ischemic cardiomyopathy     Will have him just take Toprol XL 25 mg with 1/2 tab in AM and 1/2 tab in PM.   Cardiology follow-up.         Right sided cerebral hemisphere cerebrovascular accident (CVA) (HCC)     Will refer for outpatient Physical and Occupational therapy.  Continue his Atorvastatin and Plavix.   I'm not sure he would benefit much from taking Zetia in addition to his  Crestor.          Relevant Orders    Referral to Occupational Therapy    Referral to Physical Therapy    DME Other    Type 2 diabetes mellitus with other specified complication (HCC)     Needs Endocrinology for subcutaneous glucose monitoring.   Endocrinology referral placed.          Relevant Orders    Referral to Endocrinology    DME Other        My total time spent caring for the patient on the day of the encounter was 45 minutes.   This does not include time spent on separately billable procedures/tests.    Donny Mcnally MD  [facility]

## 2022-03-16 NOTE — ASSESSMENT & PLAN NOTE
Will have him just take Toprol XL 25 mg with 1/2 tab in AM and 1/2 tab in PM.   Cardiology follow-up.

## 2022-03-16 NOTE — ASSESSMENT & PLAN NOTE
Will refer for outpatient Physical and Occupational therapy.  Continue his Atorvastatin and Plavix.   I'm not sure he would benefit much from taking Zetia in addition to his Crestor.

## 2022-03-16 NOTE — PATIENT INSTRUCTIONS
Endocrinology, Dr Kim Grey    Physical therapy and Occupational therapy ordered.     Follow up one month.     Metoprolol ER 25 mg --- take 1/2 tab twice daily.

## 2022-03-23 DIAGNOSIS — E11.69 TYPE 2 DIABETES MELLITUS WITH OTHER SPECIFIED COMPLICATION, WITH LONG-TERM CURRENT USE OF INSULIN (HCC): ICD-10-CM

## 2022-03-23 DIAGNOSIS — Z79.4 TYPE 2 DIABETES MELLITUS WITH OTHER SPECIFIED COMPLICATION, WITH LONG-TERM CURRENT USE OF INSULIN (HCC): ICD-10-CM

## 2022-04-05 ENCOUNTER — OCCUPATIONAL THERAPY (OUTPATIENT)
Dept: OCCUPATIONAL THERAPY | Facility: REHABILITATION | Age: 57
End: 2022-04-05
Attending: NURSE PRACTITIONER
Payer: COMMERCIAL

## 2022-04-05 ENCOUNTER — PHYSICAL THERAPY (OUTPATIENT)
Dept: PHYSICAL THERAPY | Facility: REHABILITATION | Age: 57
End: 2022-04-05
Attending: NURSE PRACTITIONER
Payer: COMMERCIAL

## 2022-04-05 DIAGNOSIS — I69.30 LATE EFFECT OF STROKE: ICD-10-CM

## 2022-04-05 PROCEDURE — 97165 OT EVAL LOW COMPLEX 30 MIN: CPT

## 2022-04-05 PROCEDURE — 97162 PT EVAL MOD COMPLEX 30 MIN: CPT

## 2022-04-05 PROCEDURE — 97110 THERAPEUTIC EXERCISES: CPT

## 2022-04-05 SDOH — ECONOMIC STABILITY: GENERAL: QUALITY OF LIFE: GOOD

## 2022-04-05 ASSESSMENT — ACTIVITIES OF DAILY LIVING (ADL)
USING THE TELPHONE: SUPERVISION
TOILETING_POSITION: SITTING
FEEDING: INDEPENDENT
BATHING_CURRENT_FUNCTION: MINIMUM ASSIST
TOILETING: INDEPENDENT
WASHING_HAIR_CURRENT_FUNCTION: MINIMUM ASSIST
CLOTHING_MANAGEMENT: STAND BY ASSIST
MEDICATION_ADMINISTRATION: MODERATE ASSIST
BATHING_POSITION: SITTING
EQUIPMENT_USED_WHILE_BATHING: SHOWER CHAIR
MONEY MANAGEMENT (EXPENSES/BILLS): MODERATE ASSIST
WRITING: SUPERVISION
DRESSING_LB_CURRENT_FUNCTION: MINIMUM ASSIST
AMBULATION_WITH_ASSISTIVE_DEVICE: INDEPENDENT
WASHING_LB_CURRENT_FUNCTION: MINIMUM ASSIST
LAUNDRY: UNABLE
SHAVING_CURRENT_FUNCTION: STAND BY ASSIST
WASHING_BACK_CURRENT_FUNCTION: MINIMUM ASSIST
PREPARING MEALS: UNABLE
LIGHT HOUSEKEEPING: UNABLE
WASHING_UPB_CURRENT_FUNCTION: MINIMUM ASSIST
POOR_BALANCE: 1
DRESSING_UB_CURRENT_FUNCTION: MINIMUM ASSIST
BRUSHING_TEETH_DENTURES_CURRENT_FUNCTION: SUPERVISION
SHOPPING: UNABLE
CLEANSING: INDEPENDENT

## 2022-04-05 ASSESSMENT — ENCOUNTER SYMPTOMS
PAIN SCALE AT LOWEST: 0
PAIN SCALE: 0
PAIN SCALE AT HIGHEST: 0
PAIN SCALE: 0

## 2022-04-05 ASSESSMENT — BALANCE ASSESSMENTS
BALANCE - SITTING STATIC: NORMAL
BALANCE - STANDING DYNAMIC: POOR +
BALANCE - SITTING DYNAMIC: NORMAL
BALANCE - STANDING STATIC: FAIR

## 2022-04-05 NOTE — OP THERAPY EVALUATION
Outpatient Physical Therapy  INITIAL NEUROLOGICAL EVALUATION    [facility] [address]  [address]  [place] [address]  Phone:  [phone]  Fax:  [phone]    Date of Evaluation: 04/05/2022    Patient: Manan Aviles  YOB: 1965  MRN: [id number]     Referring Provider: BANDAR Shahid  [address]  [place] [address]   Referring Diagnosis Late effect of stroke [I69.30]     Time Calculation    Start time: 0930  Stop time: 1015 Time Calculation (min): 45 minutes             Chief Complaint: Difficulty Walking    Visit Diagnoses     ICD-10-CM   1. Late effect of stroke  I69.30       Subjective:   History of Present Illness:     Date of onset:  2/17/2022    Mechanism of injury:   56 y.o. right hand dominant male with a past medical history of hypertension, hyperlipidemia type 2 diabetes, coronary artery disease, history of left frontal stroke in 2018, Left BKA in 12/2019;  who presented to hospital on 2/17/2022 with left-sided weakness, left facial droop.  MR brain found acute infarct in the right posterior limb of internal capsule adjacent to the basal ganglia.    Pt was admitted to acute rehab 2/24 - 3/12.  Pt has since been home with his wife.  PLOF: independent with mobility with L BK prosthesis (no AD), working at gas station, independent with ADLs.  Currently pt using FWW for gait in home and w/c for mobility in home.  States he is able to walk in home, from room to room, to / from bathroom with FWW.  Uses w/c when doing light meal prep in kitchen, or going to rest on couch or bed.  Also uses w/c when he gets up in the middle of the night for toileting.  Requires assist with some ADLs and functional mobility,  Independent with bed mobility.  1 step to enter/exit home, mod I with FWW.  No falls reported. Pt reports sensation is intact on L side.  Feels weaker on L side.  Has been doing some seated and supine exercises at home provided by  therapists from rehab hospital.  Notes that L leg shakes with supine exercises.  Sleep disturbance:  Not disrupted  Pain:     Current pain ratin  Social Support:     Lives in:  One-story house    Lives with:  Spouse  Hand dominance:  Right  Treatments:     Previous treatment:  Physical therapy and occupational therapy    Current treatment:  Home exercise program  Patient Goals:     Patient goals for therapy:  Increased strength, independence with ADLs/IADLs and return to work      Past Medical History:   Diagnosis Date   • CAD (coronary artery disease)    • Diabetes (HCC)    • Hyperlipidemia    • Hypertension      Past Surgical History:   Procedure Laterality Date   • KNEE AMPUTATION BELOW Left 2019    Procedure: AMPUTATION, BELOW KNEE;  Surgeon: Koby Zhao M.D.;  Location: SURGERY [facility];  Service: Orthopedics   • ZZZ CARDIAC CATH  2019    multi-vessel disease   • IRRIGATION & DEBRIDEMENT ORTHO Left 2019    Procedure: IRRIGATION AND DEBRIDEMENT, WOUND-FOOT, BIOLOGIC PLACEMENT, WOUND VAC PLACEMENT;  Surgeon: Charles Chopra M.D.;  Location: SURGERY [facility];  Service: Orthopedics     Social History     Tobacco Use   • Smoking status: Never Smoker   • Smokeless tobacco: Never Used   Substance Use Topics   • Alcohol use: No     Family and Occupational History     Socioeconomic History   • Marital status:      Spouse name: Not on file   • Number of children: Not on file   • Years of education: Not on file   • Highest education level: Not on file   Occupational History   • Not on file       Objective:     Strength:   Lower extremity (left):     Hip flexion: 4-    Hip extension: 4-    Hip abduction: 3+    Hip adduction: 3+    Knee flexion: 4-    Knee extension: 4-  Lower extremity (right):     Hip flexion: 5    Hip extension: 5    Hip abduction: 4+    Hip adduction: 4+    Knee flexion: 5    Knee extension: 5    Ankle dorsiflexion: 4+    Ankle plantar flexion:  4+    Tone, Sensation and Coordination:     Sensation     Sensation comments:   Pt denies numbness/tingling, reports intact sensation to touch and temperature.    Coordination   Upper extremity (left):     Slow alternating movements: Impaired    Rapid alternating movements: Absent    Finger touch to nose: Impaired  Upper extremity (right):     Slow alternating movements: Within functional limits    Rapid alternating movements: Within functional limits    Finger touch to nose: Within functional limits  Lower extremity (right):     Heel to shin: Within functional limits      Coordination comments:   L heel to shin limited by weakness.  Unable to assess toe tapping due to L BK prosthesis.    Cognition:     Cognition Comments:  Pt reports no change in memory, problem solving, or attention since stroke.    Vision/Perception:     Vision/Perception Comments:   Reading glasses.  No change reported since CVA.    Postural Control (Balance)     Sitting (static): Normal    Sitting (dynamic): Normal    Standing (static): Fair    Standing (dynamic): Poor +    Ambulation: Level Surfaces   Ambulation with assistive device: independent (With L BKA prosthesis and FWW.)    Additional Level Surfaces Ambulation Details  Pt lifting and placing walker ahead during DL stance rather than rolling FWW forward.  With cues, pt able to perform gait with forward rolling of FWW.    Observational Gait   Gait: asymmetric     Walking speed below functional limits.    Decreased left stance time.     Quality of Movement During Gait     Hip   Hip (Left): Positive circumducted.     Knee   Knee (Left): Positive stiff knee.     Additional Quality of Movement During Gait Details  Lacks adequate L knee flexion and heel off in left swing, compensates with mild circumduction.  Pt given visual and tactile cues for correction, to increase knee flexion in pre swing and early swing.  Pt able to make moderate corrections.  Reviewed desired gait pattern with patient  and his wife, and encouraged him to practice this when walking with walker in the home.    Balance/Gait Comments   TUG test, 2 trials, with FWW: 54 and 56 sec for score= 55 sec.  Vitals following TUG testing: HR=84, O2 sats= 96%.    Activities of Daily Living:   Transfers/Mobility:     Bed/chair transfers: independent       Bed/chair transfer equipment used: FWW    Sit to stand: independent       Sit to stand equipment used: FWW    Bed mobility: independent        Therapeutic Exercises (CPT 32597):     1. AP weight shift in stride stance    2. Standing hip flex    3. Standing hip abd    4. Standing hip ext    5. Lateral steps at countertop    6. STS with hands on thighs      Therapeutic Exercise Summary: Pt performed these exercises with instruction and SPV.  Provided handout with these exercises for daily HEP to be performed at countertop or with UE support as needed.      Time-based treatments/modalities:    Physical Therapy Timed Treatment Charges  Therapeutic exercise minutes (CPT 47163): 10 minutes      Assessment, Response and Plan:   Impairments: abnormal coordination, abnormal gait, difficulty performing job, impaired physical strength and lacks appropriate home exercise program    Assessment details:  56 y.o. male presents s/p CVA of right posterior limb of internal capsule adjacent to basal ganglia.  Pt demonstrates weakness and incoordination of LUE> LLE, causing gait impairments and decreased independence with functional mobility.  Pt will benefit from skilled PT services to address given impairments, for gait and balance training, to maximize function and independence/safety with ADLs and gait.  Prognosis: good    Goals:   Short Term Goals:   1. Pt no longer using w/c for mobility.  2. Pt able to walk 200 feet with QC/ SPC with SPV.  3. Pt demo more symmetrical gait pattern for safety and minimized fall risk.  Short term goal time span:  2-4 weeks      Long Term Goals:    1. Pt able to perform sit <->  stand from standard chair without UE support.  2. Pt able to walk in home without AD.  3. Pt will demo increased function via improved scores on TUG.  Long term goal time span:  6-8 weeks    Plan:   Therapy options:  Physical therapy treatment to continue  Planned therapy interventions:  Neuromuscular Re-education (CPT 90755), Gait Training (CPT 24839) and Therapeutic Exercise (CPT 93949)  Frequency:  2x week  Duration in visits:  16  Discussed with:  Patient      Functional Assessment Used  Timed Up and Go (TUG) Test  Time, in seconds (up from chair, walk 3m and return to chair and sit): 55 seconds       Referring provider co-signature:  I have reviewed this plan of care and my co-signature certifies the need for services.    Certification Period: 04/05/2022 to  06/21/22    Physician Signature: ________________________________ Date: ______________

## 2022-04-05 NOTE — OP THERAPY EVALUATION
Outpatient Occupational Therapy  INITIAL NEUROLOGICAL EVALUATION    [facility] Occup[facility] Therapy [address].  [address]  [place] [address]  Phone:  [phone]  Fax:  [phone]    Date of Evaluation: 04/05/2022    Patient: Manan Aviles  YOB: 1965  MRN: [id number]     Referring Provider: BANDAR Shahid  [facility] [[email or web address]] [address]  [place] [address]   Referring Diagnosis Late effect of stroke [I69.30]     Time Calculation    Start time: 0800  Stop time: 0845 Time Calculation (min): 45 minutes             Chief Complaint: Extremity Weakness and Self Care Duties    Visit Diagnoses     ICD-10-CM   1. Late effect of stroke  I69.30       Subjective:   History of Present Illness:     Date of onset:  2/24/2022    Mechanism of injury:  As per MD note on 3/22/22:    CC:  Follows up on recent stroke      HPI: Israel Aviles is a 56 y.o. male who presented with stroke on 2/24/22.     Problem  Right Sided Cerebral Hemisphere Cerebrovascular Accident (Cva) (Hcc)    He was hospitalized for acute stroke on 2/24/22 with left sided deficits.  Was discharged from Rehab unit on 3/12/22.  Being followed by Neurology who saw him this morning.  They started hm on Citalopram for mood.      Type 2 Diabetes Mellitus With Other Specified Complication (Hcc)    He is interested in subcutaneous glucose monitoring.  Has had difficult to control diabetes.  Uses insulin.  Has a left lower extremity amputation.      Ischemic Cardiomyopathy    The higher dosage of Toprol XL makes him dizzy.  His pressure has been mostly low and is 106/70 today.       Below-Knee Amputation (Hcc)    Needs a new socket for his left lower extremity prosthesis.  He uses [facility].            REVIEW OF SYSTEMS:   Ten systems reviewed and were negative except as noted in the HPI.                PROBLEM LIST  Patient Active Problem List  Diagnosis  • Acute ischemic stroke (HCC)  • Uncontrolled type 2 diabetes  mellitus with hyperglycemia (HCC)  • Hyperlipidemia  • Wound infection  • PVD (peripheral vascular disease) (HCC)  • Diabetic foot ulcer (HCC)  • Diabetic polyneuropathy associated with type 2 diabetes mellitus (HCC)  • Pressure injury of deep tissue of left foot  • Diabetic ulcer of toe of left foot associated with type 2 diabetes mellitus, with necrosis of muscle (HCC)  • Osteomyelitis of left foot (HCC)  • Diabetic foot (HCC)  • Vitamin D deficiency  • CAD (coronary artery disease)  • HTN (hypertension)  • Orthostatic hypotension  • Non-healing wound of right heel  • Below-knee amputation ([facility])  • Adjustment disorder with depressed mood  • HFrEF (heart failure with reduced ejection fraction) ([facility])  • Ischemic cardiomyopathy  • Body mass index (BMI) 23.0-23.9, adult  • Type 2 diabetes mellitus with other specified complication ([facility])  • TIA (transient ischemic attack)  • 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)  • Dyslipidemia due to type 2 diabetes mellitus ([facility])  • Colon cancer screening declined  • Neurological deficit present  • Stroke-like symptoms  • Anemia  • Right sided cerebral hemisphere cerebrovascular accident (CVA) ([facility])  • Late effect of stroke  • Drug therapy  • Anxiety      Quality of life:  Good  Prior level of function:  Was independent prior to CVA  Headaches:  no headaches  Ear problems: none  Sleep disturbance:  Not disrupted  Pain:     Current pain ratin    At best pain ratin    At worst pain ratin  Social Support:     Lives in:  One-story house    Lives with:  Spouse  Hand dominance:  Right  Diagnostic Tests:     MRI studies: abnormal    Treatments:     Previous treatment:  Physical therapy, occupational therapy and speech therapy    Discharged from (in last 30 days): rehabilitation facility      Treatment Comments:  Discharged home on 3/17/22.  Patient Goals:     Patient goals for therapy:  Bibb with ADLs/IADLs, increased strength, return to  work and return to sport/leisure activities      Past Medical History:   Diagnosis Date   • CAD (coronary artery disease)    • Diabetes (HCC)    • Hyperlipidemia    • Hypertension      Past Surgical History:   Procedure Laterality Date   • KNEE AMPUTATION BELOW Left 12/20/2019    Procedure: AMPUTATION, BELOW KNEE;  Surgeon: Koby Zhao M.D.;  Location: SURGERY [facility];  Service: Orthopedics   • ZZZ CARDIAC CATH  12/16/2019    multi-vessel disease   • IRRIGATION & DEBRIDEMENT ORTHO Left 6/24/2019    Procedure: IRRIGATION AND DEBRIDEMENT, WOUND-FOOT, BIOLOGIC PLACEMENT, WOUND VAC PLACEMENT;  Surgeon: Charles Chopra M.D.;  Location: SURGERY [facility];  Service: Orthopedics     Social History     Tobacco Use   • Smoking status: Never Smoker   • Smokeless tobacco: Never Used   Substance Use Topics   • Alcohol use: No     Family and Occupational History     Socioeconomic History   • Marital status:      Spouse name: Not on file   • Number of children: Not on file   • Years of education: Not on file   • Highest education level: Not on file   Occupational History   • Not on file       Objective:   Active Range of Motion:   Upper extremity (left):     All left upper extremity active range of motion: All within functional limits      Strength:   Upper extremity strength (left):     Shoulder flexion: 3    Shoulder extension:  4    Shoulder abduction: 3    Shoulder adduction: 4    Shoulder external rotation:  4    Shoulder internal rotation: 3    Elbow flexion: 4    Elbow extension: 4    Forearm pronation: 4    Forearm supination: 4    Wrist flexion: 4-    Wrist extension: 4-    Fingers flexion: 4    Fingers extension: 4    Fingers abduction: 3+    Fingers adduction: 3+    Strength Comments:   strength:  R=76 pounds   L= 44 pounds    Pinch strength:              R         L  Lateral   15    10  3 point   12       7    Tone, Sensation and Coordination:   Tone:     Left upper extremity muscle tone:  Normal    Sensation   Upper extremity (left):     Light touch: Intact    Sharp/dull: Intact     Stereognosis: Intact     Proprioception: Intact     Sensation comments:   Patient feels that the temperature in his fluctuates from hot and cold.     Coordination   Upper extremity (left):     Fine motor: Impaired    Gross motor: Impaired    Slow alternating movements: Impaired    Rapid alternating movements: Impaired    Finger to finger: Impaired    Finger touch to nose: Impaired      Coordination comments:   9 hole peg test:  R= 27 seconds   L= 1 minute and 38 seconds     Cognition:     Orientation: normal to time, normal to place, normal to person and normal to situation    Direction following: two step    Cognition Comments:  Further assessment to be completed     Vision/Perception:     Formal vision perception assessment needed.    Activities of Daily Living:   Transfers/Mobility:     Bed/chair transfers: independent    Sit to stand: supervision    Toilet transfers: supervision    Tub/shower transfers: supervision    Bed mobility: supervision    Toileting:     Toileting: independent    Hygiene: independent    Clothing management: stand by assist    Toileting position: sitting    Bathing:     Bathing: minimum assist    Bathing position: sitting    Washing hair: minimum assist    Washing back: minimum assist    Washing upper body: minimum assist    Washing lower body: minimum assist    Bathing assistive device(s): shower chair    Dressing:     Dressing upper body: minimum assist    Dressing lower body: minimum assist    Grooming:     Brushing teeth or denture care: supervision    Shaving: stand by assist    Feeding:     Feeding: independent    Household Management:     Housekeeping: unable    Laundry: unable    Meal preparation: unable    Medication management: moderate assist    Money management: moderate assist    Shopping: unable    Telephone use: supervision    Writing: supervision    ADL Comments:  Impaired  standing tolerance during ADLs.  Normally completes most seated at home.  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          Therapeutic Exercises (CPT 96545):     1. AROM of left shoulder to minimze ST shortening    2. Medium resistance theraputty     3. Fine and gross motor coordination    4. Fine motor manipulation and dexterity.    Therapeutic Exercise Summary:  Initiated HEP to add to previous HEP given in inpatient rehab.  To complete 3 x a day    No charge       Time-based treatments/modalities:           Assessment, Response and Plan:   Impairments: abnormal coordination, activity intolerance, difficulty performing job, fine motor function, impaired functional mobility, impaired balance, impaired physical strength, lacks appropriate home exercise program, limited ADL's and limited mobility    Assessment details:  Patient is a 57 y/o male s/p R CVA in February of this year and recently discharged from inpatient rehabilitation.  Patient presenting with hemiparesis of left non-dominant arm, impaired activity tolerance, impaired standing balance which are all affecting his level of function. Patient would benefit from OT intervention to enhance functional use of left arm, enhance standing balance and endurance during functional tasks to enhance level of function and independence.    Barriers to therapy:  None  Prognosis: good    Goals:   Short Term Goals:   Patient will be independent with HEP  Patient will gain overall 4/5 left UE strength  Patient will be able to engage in activity for 7 minutes while standing  Patient will be Set up/ Supervision with lower body dressing  Patient will increase left  strength by 10 pounds  Short term goal timespan:  2-4 weeks    Long Term Goals:   Patient will be Mod I with lower body dressing and bathing  Patient will be Set  up/supervision for light meal prep while standing at kitchen counter  Patient will be able to engage in activity for at least 10 minutes while standing  Patient will increase left  strength by 15 pounds  Patient will be able to type at least 15 words per minute  Patient will score at least 25/80 on the UEFI  Long term goal timespan:  6-8 weeks    Plan:   Therapy options:  Occupational therapy treatment to continue  Planned therapy interventions:  Neuromuscular Re-education (CPT 92919)  Frequency:  2x week  Duration in weeks:  8  Duration in visits:  16  Discussed with:  Patient, family and caregiver      Functional Assessment Used: UEFI = 3/80        Referring provider co-signature:  I have reviewed this plan of care and my co-signature certifies the need for services.    Certification Period: 04/05/2022 to  05/31/22    Physician Signature: ________________________________ Date: ______________

## 2022-04-06 ENCOUNTER — TELEPHONE (OUTPATIENT)
Dept: MEDICAL GROUP | Facility: OTHER | Age: 57
End: 2022-04-06
Payer: COMMERCIAL

## 2022-04-06 DIAGNOSIS — E11.69 TYPE 2 DIABETES MELLITUS WITH OTHER SPECIFIED COMPLICATION, WITH LONG-TERM CURRENT USE OF INSULIN (HCC): ICD-10-CM

## 2022-04-06 DIAGNOSIS — Z79.4 TYPE 2 DIABETES MELLITUS WITH OTHER SPECIFIED COMPLICATION, WITH LONG-TERM CURRENT USE OF INSULIN (HCC): ICD-10-CM

## 2022-04-06 NOTE — TELEPHONE ENCOUNTER
Pt's wife is requesting a referral to [facility] , phn: [phone], please place referral, thank you

## 2022-04-07 ENCOUNTER — APPOINTMENT (OUTPATIENT)
Dept: PHYSICAL THERAPY | Facility: REHABILITATION | Age: 57
End: 2022-04-07
Attending: NURSE PRACTITIONER
Payer: COMMERCIAL

## 2022-04-07 ENCOUNTER — APPOINTMENT (OUTPATIENT)
Dept: OCCUPATIONAL THERAPY | Facility: REHABILITATION | Age: 57
End: 2022-04-07
Attending: NURSE PRACTITIONER
Payer: COMMERCIAL

## 2022-04-12 ENCOUNTER — APPOINTMENT (OUTPATIENT)
Dept: OCCUPATIONAL THERAPY | Facility: REHABILITATION | Age: 57
End: 2022-04-12
Attending: NURSE PRACTITIONER
Payer: COMMERCIAL

## 2022-04-12 ENCOUNTER — PHYSICAL THERAPY (OUTPATIENT)
Dept: PHYSICAL THERAPY | Facility: REHABILITATION | Age: 57
End: 2022-04-12
Attending: NURSE PRACTITIONER
Payer: COMMERCIAL

## 2022-04-12 DIAGNOSIS — I69.30 LATE EFFECT OF STROKE: ICD-10-CM

## 2022-04-12 PROCEDURE — 97116 GAIT TRAINING THERAPY: CPT

## 2022-04-12 PROCEDURE — 999999 HB NO CHARGE

## 2022-04-12 PROCEDURE — 97110 THERAPEUTIC EXERCISES: CPT

## 2022-04-12 NOTE — OP THERAPY DAILY TREATMENT
Outpatient Physical Therapy  DAILY TREATMENT     [facility] [address].  [address]  [place] [address]  Phone:  [phone]  Fax:  [phone]    Date: 04/12/2022    Patient: Manan Aviles  YOB: 1965  MRN: [id number]     Time Calculation    Start time: 0810  Stop time: 0855 Time Calculation (min): 45 minutes         Chief Complaint: Difficulty Walking    Visit #: 2    SUBJECTIVE:  Pt reports he continues to practice walking at home, with the FWW, and just holding the FWW above the ground.  Also doing seated and standing HEP daily.    OBJECTIVE:        Therapeutic Exercises (CPT 52013):     1. NuStep, level 3, x5 min    2. March with BUE support, x10B, anterior R hip pain with R SL weight bearing    3. Seated ball roll, x10B    4. Seated leg press into dynadisc, x10B    Therapeutic Treatments and Modalities:     Therapeutic Treatment and Modalities Summary: -step forward and back, in place, x10B with QC and CGA/SBA.  -STS from EOM with one foot on Airex x10B with QC and CGA at gait belt.  Pt demo mild instability and occasional leaning back of leg against EOM for support.  -lateral step to on tape on floor with QC, x10B with light CGA at gait belt.  -gait with QC, 2x 100feet, with tactile, visual, and verbal cues for standing tall, looking ahead rather than down, and for LLE swing knee flexion and heel strike.  Pt able to improve step length and symmetry with given cues.  -gait with FWW x100 feet, with cues for standing tall and even step length.  -stride stance balance x30 sec B, and with head turns R/L.  Pt demo increased difficulty with balance with R foot forward and head turns.    Time-based treatments/modalities:    Physical Therapy Timed Treatment Charges  Gait training minutes (CPT 28024): 30 minutes  Therapeutic exercise minutes (CPT 08337): 15 minutes  No charge due to insurance authorization not yet received, but patient was not  notified.    ASSESSMENT:   Response to treatment: Pt demo good ashutosh for today's interventions, and demo improved gait pattern with cues and training.    PLAN/RECOMMENDATIONS:   Plan for treatment: therapy treatment to continue next visit.  Planned interventions for next visit: continue with current treatment.

## 2022-04-14 ENCOUNTER — APPOINTMENT (OUTPATIENT)
Dept: OCCUPATIONAL THERAPY | Facility: REHABILITATION | Age: 57
End: 2022-04-14
Attending: NURSE PRACTITIONER
Payer: COMMERCIAL

## 2022-04-14 ENCOUNTER — APPOINTMENT (OUTPATIENT)
Dept: PHYSICAL THERAPY | Facility: REHABILITATION | Age: 57
End: 2022-04-14
Attending: NURSE PRACTITIONER
Payer: COMMERCIAL

## 2022-04-18 ENCOUNTER — OCCUPATIONAL THERAPY (OUTPATIENT)
Dept: OCCUPATIONAL THERAPY | Facility: REHABILITATION | Age: 57
End: 2022-04-18
Attending: NURSE PRACTITIONER
Payer: COMMERCIAL

## 2022-04-18 DIAGNOSIS — I69.30 LATE EFFECT OF STROKE: ICD-10-CM

## 2022-04-18 PROCEDURE — 97112 NEUROMUSCULAR REEDUCATION: CPT

## 2022-04-18 NOTE — OP THERAPY DAILY TREATMENT
Outpatient Occupational Therapy  DAILY TREATMENT     [facility] Occupational [address]  [address]  [place] [address]  Phone:  [phone]  Fax:  [phone]    Date: 04/18/2022    Patient: Manan Aviles  YOB: 1965  MRN: [id number]     Time Calculation  Start time: 0800  Stop time: 0845 Time Calculation (min): 45 minutes         Chief Complaint: Extremity Weakness and Self Care Duties    Visit #: 2    SUBJECTIVE:  I have been doing my exercises    OBJECTIVE:  Current objective measures:   Active Range of Motion:   Upper extremity (left):     All left upper extremity active range of motion: All within functional limits        Strength:   Upper extremity strength (left):     Shoulder flexion: 3    Shoulder extension:  4    Shoulder abduction: 3    Shoulder adduction: 4    Shoulder external rotation:  4    Shoulder internal rotation: 3    Elbow flexion: 4    Elbow extension: 4    Forearm pronation: 4    Forearm supination: 4    Wrist flexion: 4-    Wrist extension: 4-    Fingers flexion: 4    Fingers extension: 4    Fingers abduction: 3+    Fingers adduction: 3+     Strength Comments:   strength:  R=76 pounds   L= 62 pounds     Pinch strength:              R         L  Lateral   15    12.5  3 point   12       14     Tone, Sensation and Coordination:   Tone:     Left upper extremity muscle tone: Normal     Sensation   Upper extremity (left):     Light touch: Intact    Sharp/dull: Intact     Stereognosis: Intact     Proprioception: Intact      Sensation comments:   Patient feels that the temperature in his fluctuates from hot and cold.      Coordination   Upper extremity (left):     Fine motor: Impaired    Gross motor: Impaired    Slow alternating movements: Impaired    Rapid alternating movements: Impaired    Finger to finger: Impaired    Finger touch to nose: Impaired      Coordination comments:   9 hole peg test:  R= 27 seconds   L= 1 minute and 28 seconds       Cognition:     Orientation: normal to time, normal to place, normal to person and normal to situation    Direction following: two step     Cognition Comments:  Further assessment to be completed      Vision/Perception:     Formal vision perception assessment needed.     Activities of Daily Living:   Transfers/Mobility:     Bed/chair transfers: independent    Sit to stand: supervision    Toilet transfers: supervision    Tub/shower transfers: supervision    Bed mobility: supervision     Toileting:     Toileting: independent    Hygiene: independent    Clothing management: stand by assist    Toileting position: sitting     Bathing:     Bathing: minimum assist    Bathing position: sitting    Washing hair: minimum assist    Washing back: minimum assist    Washing upper body: minimum assist    Washing lower body: minimum assist    Bathing assistive device(s): shower chair     Dressing:     Dressing upper body: minimum assist    Dressing lower body: minimum assist     Grooming:     Brushing teeth or denture care: supervision    Shaving: stand by assist     Feeding:     Feeding: independent     Household Management:     Housekeeping: unable    Laundry: unable    Meal preparation: unable    Medication management: moderate assist    Money management: moderate assist    Shopping: unable    Telephone use: supervision    Writing: supervision     ADL Comments:  Impaired standing tolerance during ADLs.  Normally completes most seated at home.  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          Therapeutic Treatments and Modalities:    1. Neuromuscular Re-education (CPT 83899)    Therapeutic Treatments and Modalities Summary: Worked on gross and fine motor strengthening and coordination NMRE for left UE.  Shoulder arc on high setting to enhance reaching, horizontal abduction/adduction  of shoulder and utilizing lateral pinch.  Working on 12 rings from left to right and then back  9# digi-flex with 10 prolonged squeezes  Wrist maze: 2 reps  Medium resistance theraputty  Pegboard solitaire focusing on 3 point prehension  Fine motor coordination and dexterity activities to enhance fine motor strength/coordination and in hand manipulation      Time-based treatments/modalities:  Neuromusc re-ed minutes (CPT 09861): 45 minutes        Pain rating before treatment: 2  Pain rating after treatment: 2  Left shoulder.    ASSESSMENT:   Response to treatment: Improvement noted in strength and dexterity    PLAN/RECOMMENDATIONS:   Plan for treatment: therapy treatment to continue next visit.  Planned interventions for next visit: continue with current treatment and neuromuscular re-education (CPT 83994)

## 2022-04-19 ENCOUNTER — APPOINTMENT (OUTPATIENT)
Dept: PHYSICAL THERAPY | Facility: REHABILITATION | Age: 57
End: 2022-04-19
Attending: NURSE PRACTITIONER
Payer: COMMERCIAL

## 2022-04-20 ENCOUNTER — OFFICE VISIT (OUTPATIENT)
Dept: PHYSICAL MEDICINE AND REHAB | Facility: REHABILITATION | Age: 57
End: 2022-04-20
Payer: COMMERCIAL

## 2022-04-20 ENCOUNTER — OCCUPATIONAL THERAPY (OUTPATIENT)
Dept: OCCUPATIONAL THERAPY | Facility: REHABILITATION | Age: 57
End: 2022-04-20
Attending: NURSE PRACTITIONER
Payer: COMMERCIAL

## 2022-04-20 VITALS
TEMPERATURE: 97.2 F | SYSTOLIC BLOOD PRESSURE: 100 MMHG | BODY MASS INDEX: 22.4 KG/M2 | DIASTOLIC BLOOD PRESSURE: 62 MMHG | HEART RATE: 96 BPM | WEIGHT: 165.34 LBS | RESPIRATION RATE: 15 BRPM | HEIGHT: 72 IN | OXYGEN SATURATION: 97 %

## 2022-04-20 DIAGNOSIS — Z99.89 WALKER AS AMBULATION AID: ICD-10-CM

## 2022-04-20 DIAGNOSIS — I69.30 LATE EFFECT OF STROKE: ICD-10-CM

## 2022-04-20 DIAGNOSIS — Z86.73 HISTORY OF STROKE: Primary | ICD-10-CM

## 2022-04-20 DIAGNOSIS — Z89.512 HX OF BKA, LEFT (HCC): ICD-10-CM

## 2022-04-20 DIAGNOSIS — Z74.09 IMPAIRED MOBILITY AND ENDURANCE: ICD-10-CM

## 2022-04-20 DIAGNOSIS — G81.94 LEFT HEMIPARESIS (HCC): ICD-10-CM

## 2022-04-20 PROCEDURE — 99215 OFFICE O/P EST HI 40 MIN: CPT | Performed by: PHYSICAL MEDICINE & REHABILITATION

## 2022-04-20 PROCEDURE — 97112 NEUROMUSCULAR REEDUCATION: CPT

## 2022-04-20 RX ORDER — BIMATOPROST 0.1 MG/ML
SOLUTION/ DROPS OPHTHALMIC
COMMUNITY
Start: 2022-04-07 | End: 2023-06-19

## 2022-04-20 ASSESSMENT — PATIENT HEALTH QUESTIONNAIRE - PHQ9
SUM OF ALL RESPONSES TO PHQ QUESTIONS 1-9: 8
5. POOR APPETITE OR OVEREATING: 0 - NOT AT ALL
CLINICAL INTERPRETATION OF PHQ2 SCORE: 3

## 2022-04-20 ASSESSMENT — FIBROSIS 4 INDEX: FIB4 SCORE: 1.93

## 2022-04-20 NOTE — PROGRESS NOTES
[facility]  PM[facility]  [facility] [place] [address]  Ph: [phone]    NEW PATIENT EVALUATION    *Patient established in PM&R practice, however, patient new to writer as patient is transferring care. Therefore, patient billed as established.     Patient Name: Israel Aviles   Patient : 1965  Patient Age: 56 y.o.     Examining Physician: Dr. Lacy Cheng, DO    PM&R History to Date - Background Information:  Dates of Admission/Discharge to ARU: 2022-3/12/2022    SUBJECTIVE:   Patient Identification: Israel Aviles is a 56 y.o. male with PMH significant for hypertension, hyperlipidemia type 2 diabetes, coronary artery disease, history of left frontal stroke in 2018, Left BKA in 2019 and rehabilitation history significant for right ischemic CVA 2022 and is presenting to PM&R clinic for a NEW OUTPATIENT evaluation with the following chief complaint/s:    Chief Complaint: Weak on the left side.     Accompanied by Today: Wife.   History of Present Illness:   - Records reviewed: Acute rehab discharge summary reviewed in its entirety.  - Therapy: Established with outpatient physical therapy, Occupational Therapy.  - Has L BKA and is wearing 6 ply right now.   - Left side is still significantly weak.   - Right now only getting occupational therapy.    - Totally independent before without use of AD.   - Uses w/c and walker at home . Has to prepare food and use w/c.   - No falls at home.   - No neuropathic pain, bowel or bladder issues.   - No sensory issues, just feels weak.   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 Currently cannot stand with walker.   - Previously had been driving as well.     Review of Systems:  All other pertinent positive review of systems are noted above in HPI.   All other systems reviewed and are negative.    Past Medical History:  Past Medical History:   Diagnosis Date   • CAD (coronary artery disease)     • Diabetes (HCC)    • Hyperlipidemia    • Hypertension       Past Surgical History:   Procedure Laterality Date   • KNEE AMPUTATION BELOW Left 12/20/2019    Procedure: AMPUTATION, BELOW KNEE;  Surgeon: Koby Zhao M.D.;  Location: SURGERY [facility];  Service: Orthopedics   • ZZZ CARDIAC CATH  12/16/2019    multi-vessel disease   • IRRIGATION & DEBRIDEMENT ORTHO Left 6/24/2019    Procedure: IRRIGATION AND DEBRIDEMENT, WOUND-FOOT, BIOLOGIC PLACEMENT, WOUND VAC PLACEMENT;  Surgeon: Charles Chopra M.D.;  Location: SURGERY [facility];  Service: Orthopedics        Current Outpatient Medications:   •  LUMIGAN 0.01 % Solution, INSTILL 1 DROP INTO EACH EYE AT BEDTIME, Disp: , Rfl:   •  atorvastatin (LIPITOR) 80 MG tablet, Take 1 Tablet by mouth every day for 90 days., Disp: 90 Tablet, Rfl: 0  •  clopidogrel (PLAVIX) 75 MG Tab, Take 1 Tablet by mouth every day., Disp: 30 Tablet, Rfl: 2  •  glipiZIDE (GLUCOTROL) 5 MG Tab, Take 0.5 Tablets by mouth 2 times daily, before breakfast and dinner., Disp: 60 Tablet, Rfl: 2  •  metFORMIN (GLUCOPHAGE) 500 MG Tab, Take 1 Tablet by mouth 2 times a day with meals., Disp: 180 Tablet, Rfl: 2  •  metoprolol SR (TOPROL XL) 25 MG TABLET SR 24 HR, Take 2 Tablets by mouth 2 times a day. (Patient taking differently: Take 50 mg by mouth 2 times a day. Indications: takes 1/2 in am 1/2 pm), Disp: 120 Tablet, Rfl: 2  •  vitamin D3 2000 UNIT Tab, Take 1 Tablet by mouth every day., Disp: 60 Tablet, Rfl:   •  multivitamin (THERAGRAN) Tab, Take 1 Tablet by mouth every day., Disp: , Rfl:   •  aspirin 81 MG EC tablet, Take 1 tablet by mouth every day., Disp: 30 tablet, Rfl: 2  •  citalopram (CELEXA) 10 MG tablet, Take 1 Tablet by mouth every day. (Patient not taking: Reported on 4/20/2022), Disp: 90 Tablet, Rfl: 1  •  gabapentin (NEURONTIN) 300 MG Cap, Take 1 Capsule by mouth every evening. (Patient not taking: Reported on 4/20/2022), Disp: 90 Capsule, Rfl: 2  No Known Allergies     Past  Social History:  Social History     Socioeconomic History   • Marital status:      Spouse name: Not on file   • Number of children: Not on file   • Years of education: Not on file   • Highest education level: Not on file   Occupational History   • Not on file   Tobacco Use   • Smoking status: Never Smoker   • Smokeless tobacco: Never Used   Vaping Use   • Vaping Use: Never used   Substance and Sexual Activity   • Alcohol use: No   • Drug use: No   • Sexual activity: Not on file   Other Topics Concern   • Not on file   Social History Narrative   • Not on file     Social Determinants of Health     Financial Resource Strain: Not on file   Food Insecurity: Not on file   Transportation Needs: Not on file   Physical Activity: Not on file   Stress: Not on file   Social Connections: Not on file   Intimate Partner Violence: Not on file   Housing Stability: Not on file        Family History:  Family History   Problem Relation Age of Onset   • Diabetes Mother    • Diabetes Father        Depression and Opioid Screening  PHQ-9:  Depression Screen (PHQ-2/PHQ-9) 3/8/2022 3/11/2022 4/20/2022   PHQ-2 Total Score 0 0 -   PHQ-2 Total Score - - 3   PHQ-9 Total Score - - 8     Interpretation of PHQ-9 Total Score   Score Severity   1-4 No Depression   5-9 Mild Depression   10-14 Moderate Depression   15-19 Moderately Severe Depression   20-27 Severe Depression     OBJECTIVE:   Vital Signs:  Vitals:    04/20/22 0911   BP: 100/62   Pulse: 96   Resp: 15   Temp: 36.2 °C (97.2 °F)   SpO2: 97%        Pertinent Labs:  Lab Results   Component Value Date/Time    SODIUM 138 03/02/2022 06:04 AM    POTASSIUM 4.1 03/02/2022 06:04 AM    CHLORIDE 101 03/02/2022 06:04 AM    CO2 26 03/02/2022 06:04 AM    GLUCOSE 134 (H) 03/02/2022 06:04 AM    BUN 18 03/02/2022 06:04 AM    CREATININE 0.74 03/02/2022 06:04 AM    CREATININE 0.9 05/09/2007 01:20 AM    BUNCREATRAT 14 11/10/2021 06:52 AM       Lab Results   Component Value Date/Time    HBA1C 8.2 (H)  02/25/2022 05:53 AM       Lab Results   Component Value Date/Time    WBC 5.1 03/02/2022 06:04 AM    RBC 4.20 (L) 03/02/2022 06:04 AM    HEMOGLOBIN 12.9 (L) 03/02/2022 06:04 AM    HEMATOCRIT 38.4 (L) 03/02/2022 06:04 AM    MCV 91.4 03/02/2022 06:04 AM    MCH 30.7 03/02/2022 06:04 AM    MCHC 33.6 (L) 03/02/2022 06:04 AM    MPV 10.5 03/02/2022 06:04 AM    NEUTSPOLYS 46.70 02/25/2022 05:53 AM    LYMPHOCYTES 42.70 (H) 02/25/2022 05:53 AM    MONOCYTES 8.20 02/25/2022 05:53 AM    EOSINOPHILS 1.60 02/25/2022 05:53 AM    BASOPHILS 0.40 02/25/2022 05:53 AM    ANISOCYTOSIS 1+ 12/20/2019 03:00 AM       Lab Results   Component Value Date/Time    ASTSGOT 31 02/25/2022 05:53 AM    ALTSGPT 30 02/25/2022 05:53 AM        Physical Exam:   GEN: No apparent distress  HEENT: Head normocephalic, atraumatic.  Sclera nonicteric bilaterally, no ocular discharge appreciated bilaterally.  CV: Extremities warm and well-perfused, no peripheral edema appreciated bilaterally.  PULMONARY: Breathing nonlabored on room air, no respiratory accessory muscle use.  Not requiring supplemental oxygen.  SKIN: No appreciable skin breakdown on exposed areas of skin.  PSYCH: Mood and affect within normal limits.  Does laugh at various times throughout exam.  NEURO: Awake alert.  Conversational.  Logical thought content.    Motor Exam Upper Extremities   ? Myotome R L   Shoulder Abduction C5 5 4-   Elbow flexion C5 5 3+   Wrist extension C6 5 4   Elbow extension C7 5 3+   Finger flexion C8 5 4-   Finger abduction T1 5 4-     Motor Exam Lower Extremities  ? Myotome R L   Hip flexion L2 5 3+   Knee extension L3 5 3+   Ankle dorsiflexion L4 5  BKA   Toe extension L5 5  BKA   Ankle plantarflexion S1 5  BKA     João's negative bilaterally  No clonus right ankle, unable to assess on left ankle due to BKA  Sensation grossly intact to light touch  Ambulatory with walker and left prosthetic limb.    Imaging:   MRI brain 2/18/2022  Acute infarct in the right  posterior limb internal capsule and adjacent basal ganglia.  Multiple remote lacunar infarcts involving the bilateral cerebral hemispheric deep white matter. These are more numerous than on the previous study from 2018.  Interval progression of chronic deep white matter microvascular ischemic changes.  Gradient echo hypointense lesions along the medial right temporal cortex involving the hippocampus and the medial right parietal region may represent focal microhemorrhages related to hypertension.    ASSESSMENT/PLAN: Israel Aviles  is a 56 y.o. male with rehabilitation history significant for right ischemic CVA 2/17/2022, here for evaluation. The following plan was discussed with the patient who is in agreement.     Visit Diagnoses     ICD-10-CM   1. History of stroke  Z86.73   2. Left hemiparesis (HCC)  G81.94   3. Hx of BKA, left (HCC)  Z89.512   4. Walker as ambulation aid  Z99.89   5. Impaired mobility and endurance  Z74.09      Promila, spouse, assists with history.    Rehab/Neuro/Ortho:   1. Right ischemic CVA 2/17/2022  2. History of left BKA 12/2019 Dr. Zhao  3. History of left-sided ischemic CVA 6/2018  - Records reviewed as aforementioned in the HPI.  - Counseled on post stroke recovery  - Driving status: Currently not driving.  Counseled not to return to driving until 's evaluation which we will revisit when we see him next.  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  Unable to function at this time due to need for walker or wheelchair to multitask.  Ambulation impaired, standing tolerance impaired, upper extremities impaired on the left.  - Time spent drafting letter to excuse patient from jury duty due to his recent stroke.  - Coordination of care: With insurance company to get patient into physical therapy as he has been unable to start thus far.  - Therapy: Established with outpatient occupational therapy.    Assessment of Current Functional Status:  4/20/2022 ambulatory with walker, left BKA prosthetic limb.  Weakness on the left side at a moderate level.    Nociceptive pain:  1.  Suspect supraspinatus tendinitis versus subdeltoid/subacromial bursitis  - Status: Mild, only occasionally bothersome in certain positions.  - Counseled on post stroke shoulder pain.  - Conservative: Consider kinesiotaping with therapy, consider icing.  - Med management: Could consider Voltaren gel in the future or lidocaine patches.    Neurogenic Bladder:  - Status: Continent and volitional.    Neurogenic Bowel:   -Status: Continent and volitional.    Neuropathic pain:  - Med management: Had been on gabapentin 300 mg nightly, patient reports not taking any longer.    Mood:  1.  Anxiety  -Placed on Celexa 10 mg by [facility], med review today revealed patient is not taking any longer.    Follow up: 10 weeks for reevaluation    Total time spent was 44 minutes.  Included in this time is the time spent preparing for the visit including record review, my exam and evaluation, counseling and education regarding that which is aforementioned in the assessment and plan.  Time was spent documenting into patient's electronic health record.  Time was spent drafting letter for patient.  Including this time was also the time spent in care coordination. Included this time as the time spent obtaining history from someone other than the patient.  Some of the time included occurred outside of charting time.  Discussion involved the patient and wife.    Please note that this dictation was created using voice recognition software. I have made every reasonable attempt to correct obvious errors but there may be errors of grammar and content that I may have overlooked prior to finalization of this note.    Dr. Lacy Cheng DO, MS  Department of Physical Medicine & Rehabilitation  Neuro Rehabilitation Clinic  [facility]

## 2022-04-20 NOTE — OP THERAPY DAILY TREATMENT
Outpatient Occupational Therapy  DAILY TREATMENT     [facility] Occupational [address]  [address]  [place] [address]  Phone:  [phone]  Fax:  [phone]    Date: 04/20/2022    Patient: Manan Aviles  YOB: 1965  MRN: [id number]     Time Calculation  Start time: 0800  Stop time: 0845 Time Calculation (min): 45 minutes         Chief Complaint: Extremity Weakness and Self Care Duties    Visit #: 3    SUBJECTIVE:  I have been working on not hiking my left shoulder     OBJECTIVE:  Current objective measures:   Active Range of Motion:   Upper extremity (left):     All left upper extremity active range of motion: All within functional limits        Strength:   Upper extremity strength (left):     Shoulder flexion: 3    Shoulder extension:  4    Shoulder abduction: 3    Shoulder adduction: 4    Shoulder external rotation:  4    Shoulder internal rotation: 3    Elbow flexion: 4    Elbow extension: 4    Forearm pronation: 4    Forearm supination: 4    Wrist flexion: 4-    Wrist extension: 4-    Fingers flexion: 4    Fingers extension: 4    Fingers abduction: 3+    Fingers adduction: 3+     Strength Comments:   strength:  R=76 pounds   L= 62 pounds     Pinch strength:              R         L  Lateral   15    12.5  3 point   12       14     Tone, Sensation and Coordination:   Tone:     Left upper extremity muscle tone: Normal     Sensation   Upper extremity (left):     Light touch: Intact    Sharp/dull: Intact     Stereognosis: Intact     Proprioception: Intact      Sensation comments:   Patient feels that the temperature in his fluctuates from hot and cold.      Coordination   Upper extremity (left):     Fine motor: Impaired    Gross motor: Impaired    Slow alternating movements: Impaired    Rapid alternating movements: Impaired    Finger to finger: Impaired    Finger touch to nose: Impaired      Coordination comments:   9 hole peg test:  R= 27 seconds   L= 1 minute and  28 seconds      Cognition:     Orientation: normal to time, normal to place, normal to person and normal to situation    Direction following: two step     Cognition Comments:  Further assessment to be completed      Vision/Perception:     Formal vision perception assessment needed.     Activities of Daily Living:   Transfers/Mobility:     Bed/chair transfers: independent    Sit to stand: supervision    Toilet transfers: supervision    Tub/shower transfers: supervision    Bed mobility: supervision     Toileting:     Toileting: independent    Hygiene: independent    Clothing management: stand by assist    Toileting position: sitting     Bathing:     Bathing: minimum assist    Bathing position: sitting    Washing hair: minimum assist    Washing back: minimum assist    Washing upper body: minimum assist    Washing lower body: minimum assist    Bathing assistive device(s): shower chair     Dressing:     Dressing upper body: minimum assist    Dressing lower body: minimum assist     Grooming:     Brushing teeth or denture care: supervision    Shaving: stand by assist     Feeding:     Feeding: independent     Household Management:     Housekeeping: unable    Laundry: unable    Meal preparation: unable    Medication management: moderate assist    Money management: moderate assist    Shopping: unable    Telephone use: supervision    Writing: supervision     ADL Comments:  Impaired standing tolerance during ADLs.  Normally completes most seated at home.  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         Therapeutic Treatments and Modalities:    1. Neuromuscular Re-education (CPT 35509)    Therapeutic Treatments and Modalities Summary: Worked on gross and fine motor strengthening and coordination NMRE for left UE.  Shoulder arc on high setting to enhance reaching, horizontal  abduction/adduction of shoulder and utilizing lateral pinch.  Working on 12 rings from left to right and then back  9# digi-flex with 10 prolonged squeezes  Wrist maze: 2 reps  Medium resistance theraputty  Pegboard solitaire focusing on 3 point prehension  Fine motor coordination and dexterity activities to enhance fine motor strength/coordination and in hand manipulation  NU-Step on resistance 3 for 15 minutes to enhance muscle strength and endurance       Time-based treatments/modalities:  Neuromusc re-ed minutes (CPT 40560): 45 minutes        Pain rating before treatment: 0  Pain rating after treatment: 0    ASSESSMENT:   Response to treatment: progressing well with less hiking of left shoulder during therapy session.      PLAN/RECOMMENDATIONS:   Plan for treatment: therapy treatment to continue next visit.  Planned interventions for next visit: continue with current treatment and neuromuscular re-education (CPT 94101)

## 2022-04-20 NOTE — Clinical Note
Can you call [employer] and see what they need to have physical therapy approved. Patient having issue with [facility] saying it is not covered. Not sure if [facility] is covered or not. Number is [phone]

## 2022-04-20 NOTE — LETTER
DOS: 4/20/2022   Re: Manan Aviles    To Whom It May Concern,     Please medically excuse Mr. Aviles from participating in jury duty, which he was recently called for, due to acute medical issue which he will be continuing to recover from over the next 3-6 months. It is my medical recommendation that he be medically exempt.     If there are any questions or concerns, please do not hesitate to contact our office:  Phone: [phone]  Fax: [phone]    Dr. Lacy Toscano DO, MS  Department of Physical Medicine & Rehabilitation  Neuro Rehabilitation Clinic  [facility]  4/20/2022  9:34 AM

## 2022-04-21 ENCOUNTER — APPOINTMENT (OUTPATIENT)
Dept: PHYSICAL THERAPY | Facility: REHABILITATION | Age: 57
End: 2022-04-21
Attending: NURSE PRACTITIONER
Payer: COMMERCIAL

## 2022-04-26 ENCOUNTER — OCCUPATIONAL THERAPY (OUTPATIENT)
Dept: OCCUPATIONAL THERAPY | Facility: REHABILITATION | Age: 57
End: 2022-04-26
Attending: NURSE PRACTITIONER
Payer: COMMERCIAL

## 2022-04-26 ENCOUNTER — PHYSICAL THERAPY (OUTPATIENT)
Dept: PHYSICAL THERAPY | Facility: REHABILITATION | Age: 57
End: 2022-04-26
Attending: NURSE PRACTITIONER
Payer: COMMERCIAL

## 2022-04-26 DIAGNOSIS — I69.30 LATE EFFECT OF STROKE: ICD-10-CM

## 2022-04-26 PROCEDURE — 97110 THERAPEUTIC EXERCISES: CPT

## 2022-04-26 PROCEDURE — 97112 NEUROMUSCULAR REEDUCATION: CPT

## 2022-04-26 PROCEDURE — 97116 GAIT TRAINING THERAPY: CPT

## 2022-04-26 NOTE — OP THERAPY DAILY TREATMENT
Outpatient Occupational Therapy  DAILY TREATMENT     [facility] Occupational [address].  [address]  [place] [address]  Phone:  [phone]  Fax:  [phone]    Date: 04/26/2022    Patient: Manan Aviles  YOB: 1965  MRN: [id number]     Time Calculation  Start time: 0800  Stop time: 0845 Time Calculation (min): 45 minutes         Chief Complaint: Extremity Weakness and Self Care Duties    Visit #: 4    SUBJECTIVE:  I feel like my arm is getting stronger    OBJECTIVE:  Current objective measures:   Active Range of Motion:   Upper extremity (left):     All left upper extremity active range of motion: All within functional limits        Strength:   Upper extremity strength (left):     Shoulder flexion: 3    Shoulder extension:  4    Shoulder abduction: 3    Shoulder adduction: 4    Shoulder external rotation:  4    Shoulder internal rotation: 3    Elbow flexion: 4    Elbow extension: 4    Forearm pronation: 4    Forearm supination: 4    Wrist flexion: 4-    Wrist extension: 4-    Fingers flexion: 4    Fingers extension: 4    Fingers abduction: 3+    Fingers adduction: 3+     Strength Comments:   strength:  R=76 pounds   L= 64 pounds     Pinch strength:              R         L  Lateral   15    12.5  3 point   12       14     Tone, Sensation and Coordination:   Tone:     Left upper extremity muscle tone: Normal     Sensation   Upper extremity (left):     Light touch: Intact    Sharp/dull: Intact     Stereognosis: Intact     Proprioception: Intact      Sensation comments:   Patient feels that the temperature in his fluctuates from hot and cold.      Coordination   Upper extremity (left):     Fine motor: Impaired    Gross motor: Impaired    Slow alternating movements: Impaired    Rapid alternating movements: Impaired    Finger to finger: Impaired    Finger touch to nose: Impaired      Coordination comments:   9 hole peg test:  R= 27 seconds   L= 1 minute and 9 seconds       Cognition:     Orientation: normal to time, normal to place, normal to person and normal to situation    Direction following: two step     Cognition Comments:  Further assessment to be completed      Vision/Perception:     Formal vision perception assessment needed.     Activities of Daily Living:   Transfers/Mobility:     Bed/chair transfers: independent    Sit to stand: supervision    Toilet transfers: supervision    Tub/shower transfers: supervision    Bed mobility: supervision     Toileting:     Toileting: independent    Hygiene: independent    Clothing management: stand by assist    Toileting position: sitting     Bathing:     Bathing: minimum assist    Bathing position: sitting    Washing hair: minimum assist    Washing back: minimum assist    Washing upper body: minimum assist    Washing lower body: minimum assist    Bathing assistive device(s): shower chair     Dressing:     Dressing upper body: minimum assist    Dressing lower body: minimum assist     Grooming:     Brushing teeth or denture care: supervision    Shaving: stand by assist     Feeding:     Feeding: independent     Household Management:     Housekeeping: unable    Laundry: unable    Meal preparation: unable    Medication management: moderate assist    Money management: moderate assist    Shopping: unable    Telephone use: supervision    Writing: supervision     ADL Comments:  Impaired standing tolerance during ADLs.  Normally completes most seated at home.  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         Therapeutic Treatments and Modalities:    1. Neuromuscular Re-education (CPT 76474)    Therapeutic Treatments and Modalities Summary: Worked on gross and fine motor strengthening and coordination NMRE for left UE.  Shoulder arc on high setting to enhance reaching, horizontal abduction/adduction  of shoulder and utilizing lateral pinch.  Working on 12 rings from left to right and then back  9# digi-flex with 10 prolonged squeezes  Wrist maze: 2 reps  velcro roll with cylindrical grasp with left hand:  5 reps  Flex/ext exercise stick in vertical and horizontal positions: 10 reps in each position  Pegboard solitaire focusing on 3 point prehension  Medium resistance theraputty   Wiping table with hand towel with left arm to enhance coordination of movement.  Fine motor coordination and dexterity activities to enhance fine motor strength/coordination and in hand manipulation        Time-based treatments/modalities:  Neuromusc re-ed minutes (CPT 91890): 45 minutes        Pain rating before treatment: 0  Pain rating after treatment: 0    ASSESSMENT:   Response to treatment: Patient progressing well with increased /pinch strength and dexterity of left hand.  Patient incorporating left arm during daily tasks.  Progress note completed and requesting more visits.       PLAN/RECOMMENDATIONS:   Plan for treatment: therapy treatment to continue next visit.  Planned interventions for next visit: continue with current treatment and neuromuscular re-education (CPT 66552)

## 2022-04-26 NOTE — OP THERAPY PROGRESS SUMMARY
Outpatient Occupational Therapy  PROGRESS SUMMARY NOTE    [facility] Occupational Therapy [address].  [address]  [place] [address]  Phone:  [phone]  Fax:  [phone]    Date of Visit: 04/26/2022    Patient: Manan Aviles  YOB: 1965  MRN: [id number]     Referring Provider: BANDAR Shahid  [address] [address]  [place] [address]   Referring Diagnosis Late effect of stroke [I69.30]     Visit Diagnoses     ICD-10-CM   1. Late effect of stroke  I69.30       Rehab Potential: excellent    Progress Report Period: 4/5/22-4/26/22    Functional Assessment Used:  UEFI= 12/80          Objective Findings and Assessment:   Patient progression towards goals: Patient has completed 4 out of the 6 approved visits and he is progressing well in therapy with increased /pinch strength and dexterity of left non-dominant arm.  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  Patient now able to dress self with continued help with shoes and tying laces; however does not some assistance in shower and wife currently completing domestic and work tasks.  Patient has also expressed interest in pre-driving therapy as he would like to return to driving in the near future.  Wife is extremely supportive and encouraging.  Patient would benefit with continued OT intervention to further enhance functional use of left UE, improve level of self care and domestic/work tasks to minimize burden of care, minimize risk of falls and enhance quality of life.      Objective findings and assessment details: Current objective measures:   Active Range of Motion:   Upper extremity (left):     All left upper extremity active range of motion: All within functional limits        Strength:   Upper extremity strength (left):     Shoulder flexion: 3    Shoulder extension:  4    Shoulder abduction: 3    Shoulder adduction:  4    Shoulder external rotation:  4    Shoulder internal rotation: 3    Elbow flexion: 4    Elbow extension: 4    Forearm pronation: 4    Forearm supination: 4    Wrist flexion: 4-    Wrist extension: 4-    Fingers flexion: 4    Fingers extension: 4    Fingers abduction: 3+    Fingers adduction: 3+     Strength Comments:   strength:  R=76 pounds   L= 64 pounds     Pinch strength:              R         L  Lateral   15    12.5  3 point   12       14     Tone, Sensation and Coordination:   Tone:     Left upper extremity muscle tone: Normal     Sensation   Upper extremity (left):     Light touch: Intact    Sharp/dull: Intact     Stereognosis: Intact     Proprioception: Intact      Sensation comments:   Patient feels that the temperature in his fluctuates from hot and cold.      Coordination   Upper extremity (left):     Fine motor: Impaired    Gross motor: Impaired    Slow alternating movements: Impaired    Rapid alternating movements: Impaired    Finger to finger: Impaired    Finger touch to nose: Impaired      Coordination comments:   9 hole peg test:  R= 27 seconds   L= 1 minute and 9 seconds      Cognition:     Orientation: normal to time, normal to place, normal to person and normal to situation    Direction following: two step     Cognition Comments:  Further assessment to be completed      Vision/Perception:     Formal vision perception assessment needed.     Activities of Daily Living:   Transfers/Mobility:     Bed/chair transfers: independent    Sit to stand: supervision    Toilet transfers: supervision    Tub/shower transfers: supervision    Bed mobility: supervision     Toileting:     Toileting: independent    Hygiene: independent    Clothing management: stand by assist    Toileting position: sitting     Bathing:     Bathing: minimum assist    Bathing position: sitting    Washing hair: minimum assist    Washing back: minimum assist    Washing upper body: minimum assist    Washing lower body: minimum  assist    Bathing assistive device(s): shower chair     Dressing:     Dressing upper body: minimum assist    Dressing lower body: minimum assist     Grooming:     Brushing teeth or denture care: supervision    Shaving: stand by assist     Feeding:     Feeding: independent     Household Management:     Housekeeping: unable    Laundry: unable    Meal preparation: unable    Medication management: moderate assist    Money management: moderate assist    Shopping: unable    Telephone use: supervision    Writing: supervision     ADL Comments:  Impaired standing tolerance during ADLs.  Normally completes most seated at home.  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     Goals:   Short Term Goals:     Patient will gain overall 4/5 left UE strength  Patient will be able to engage in activity for 7 minutes while standing  Patient will be Set up/ Supervision with lower body dressing  Patient will increase left  strength by 10 pounds  Patient will initiate pre-driving training to ascertain safety prior to returning to driving activity.   Short term goal timespan:  2-4 weeks    Long Term Goals:   Patient will be Mod I with lower body dressing and bathing  Patient will be Set up/supervision for light meal prep while standing at kitchen counter  Patient will be able to engage in activity for at least 10 minutes while standing  Patient will increase left  strength by 15 pounds  Patient will be able to type at least 15 words per minute  Patient will score at least 25/80 on the UEFI  Long term goal timespan:  4-6 weeks    Plan:   Planned therapy interventions:  Neuromuscular Re-education (CPT 98855)  Other planned therapy interventions:  97112 x 3  Frequency:  2x week  Duration in weeks:  6  Duration in visits:  12      Referring provider co-signature:  I have reviewed this plan of  care and my co-signature certifies the need for services.    Certification Period: 04/26/2022 to 06/09/22    Physician Signature: ________________________________ Date: ______________

## 2022-04-26 NOTE — OP THERAPY DAILY TREATMENT
"  Outpatient Physical Therapy  DAILY TREATMENT     [facility] Physical [address]  [address]  [place] [address]  Phone:  [phone]  Fax:  [phone]    Date: 04/26/2022    Patient: Manan Aviles  YOB: 1965  MRN: [id number]     Time Calculation    Start time: 0845  Stop time: 0930 Time Calculation (min): 45 minutes         Chief Complaint: Difficulty Walking    Visit #: 2    SUBJECTIVE:  Pt reports he has been walking with FWW and doing exercises at home.  Still feels weak and shaky on left side.    OBJECTIVE:        Therapeutic Exercises (CPT 95920):     2. March with BUE support, x10B, anterior R hip pain with R SL weight bearing    3. Step ups, 4\" x10B with BUE support at //bars    5. TKE with heavy band  unsupported, x10b    6. Standing hip abd/add on disc glider, x10B with BUE support at //bars    7. Standing hip flex/ext on disc glider, 2x10B with BUE support at //bars, difficulty with LLE coordination    Therapeutic Treatments and Modalities:     1. Gait Training (CPT 89180)    Therapeutic Treatment and Modalities Summary: -lateral gait at//bars, 4x10 feet with BUE support.  -alt step tap to cones x10 with BUE support at //bars.  -STS without UE support from EOM, x10.  -gait with QC 100feet x3 with light CGA at gait belt.  Verbal cues for thoracic and cervical extension, shoulders back and head tall, looking ahead.  -stride stance balance with hands clasped forward flexion 2 x10 sec, then hands clasped OH flexion x10 sec B, with CGA at gait belt.      Time-based treatments/modalities:    Physical Therapy Timed Treatment Charges  Gait training minutes (CPT 38692): 30 minutes  Therapeutic exercise minutes (CPT 29477): 15 minutes    ASSESSMENT:   Response to treatment: Improved gait pattern with QC versus initial trial.  Difficulty with SL stance and LE closed chain strength.    PLAN/RECOMMENDATIONS:   Plan for treatment: therapy treatment to continue next visit.  " Re-assess objective measures.  Planned interventions for next visit: continue with current treatment.

## 2022-04-28 ENCOUNTER — PHYSICAL THERAPY (OUTPATIENT)
Dept: PHYSICAL THERAPY | Facility: REHABILITATION | Age: 57
End: 2022-04-28
Attending: NURSE PRACTITIONER
Payer: COMMERCIAL

## 2022-04-28 ENCOUNTER — OCCUPATIONAL THERAPY (OUTPATIENT)
Dept: OCCUPATIONAL THERAPY | Facility: REHABILITATION | Age: 57
End: 2022-04-28
Attending: NURSE PRACTITIONER
Payer: COMMERCIAL

## 2022-04-28 DIAGNOSIS — I69.30 LATE EFFECT OF STROKE: ICD-10-CM

## 2022-04-28 PROCEDURE — 97112 NEUROMUSCULAR REEDUCATION: CPT

## 2022-04-28 PROCEDURE — 97116 GAIT TRAINING THERAPY: CPT

## 2022-04-28 NOTE — OP THERAPY DAILY TREATMENT
Outpatient Occupational Therapy  DAILY TREATMENT     [facility] Occupational [address].  [address]  [place] [address]  Phone:  [phone]  Fax:  [phone]    Date: 04/28/2022    Patient: Manan Aviles  YOB: 1965  MRN: [id number]     Time Calculation  Start time: 0800  Stop time: 0845 Time Calculation (min): 45 minutes         Chief Complaint: Extremity Weakness and Self Care Duties    Visit #: 5    SUBJECTIVE:  I can tie my shoes now.      OBJECTIVE:  Current objective measures:   Active Range of Motion:   Upper extremity (left):     All left upper extremity active range of motion: All within functional limits        Strength:   Upper extremity strength (left):     Shoulder flexion: 3    Shoulder extension:  4    Shoulder abduction: 3    Shoulder adduction: 4    Shoulder external rotation:  4    Shoulder internal rotation: 3    Elbow flexion: 4    Elbow extension: 4    Forearm pronation: 4    Forearm supination: 4    Wrist flexion: 4-    Wrist extension: 4-    Fingers flexion: 4    Fingers extension: 4    Fingers abduction: 3+    Fingers adduction: 3+     Strength Comments:   strength:  R=76 pounds   L= 64 pounds     Pinch strength:              R         L  Lateral   15    12.5  3 point   12       14     Tone, Sensation and Coordination:   Tone:     Left upper extremity muscle tone: Normal     Sensation   Upper extremity (left):     Light touch: Intact    Sharp/dull: Intact     Stereognosis: Intact     Proprioception: Intact      Sensation comments:   Patient feels that the temperature in his fluctuates from hot and cold.      Coordination   Upper extremity (left):     Fine motor: Impaired    Gross motor: Impaired    Slow alternating movements: Impaired    Rapid alternating movements: Impaired    Finger to finger: Impaired    Finger touch to nose: Impaired      Coordination comments:   9 hole peg test:  R= 27 seconds   L= 1 minute and 9 seconds      Cognition:      Orientation: normal to time, normal to place, normal to person and normal to situation    Direction following: two step     Cognition Comments:  Further assessment to be completed      Vision/Perception:     Formal vision perception assessment needed.     Activities of Daily Living:   Transfers/Mobility:     Bed/chair transfers: independent    Sit to stand: supervision    Toilet transfers: supervision    Tub/shower transfers: supervision    Bed mobility: supervision     Toileting:     Toileting: independent    Hygiene: independent    Clothing management: stand by assist    Toileting position: sitting     Bathing:     Bathing: minimum assist    Bathing position: sitting    Washing hair: minimum assist    Washing back: minimum assist    Washing upper body: minimum assist    Washing lower body: minimum assist    Bathing assistive device(s): shower chair     Dressing:     Dressing upper body: minimum assist    Dressing lower body: minimum assist     Grooming:     Brushing teeth or denture care: supervision    Shaving: stand by assist     Feeding:     Feeding: independent     Household Management:     Housekeeping: unable    Laundry: unable    Meal preparation: unable    Medication management: moderate assist    Money management: moderate assist    Shopping: unable    Telephone use: supervision    Writing: supervision     ADL Comments:  Impaired standing tolerance during ADLs.  Normally completes most seated at home.  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         Therapeutic Treatments and Modalities:    1. Neuromuscular Re-education (CPT 81476)    Therapeutic Treatments and Modalities Summary: Worked on gross and fine motor strengthening and coordination NMRE for left UE.  Shoulder arc on high setting to enhance reaching, horizontal abduction/adduction of shoulder and  utilizing lateral pinch.  Working on 12 rings from left to right and then back  9# digi-flex with 10 prolonged squeezes  Flexion/extension exercise stick in vertical and horizontal positions = 10 reps in each position  Velcro roll with cylindrical grasp=5 reps with left hand.   Wrist maze: 2 reps  Medium resistance theraputty  Pegboard solitaire focusing on 3 point prehension  Fine motor coordination and dexterity activities to enhance fine motor strength/coordination and in hand manipulation  NU-Step on resistance 3 for 15 minutes to enhance muscle strength and endurance       Time-based treatments/modalities:  Neuromusc re-ed minutes (CPT 45094): 45 minutes        Pain rating before treatment: 0  Pain rating after treatment: 0    ASSESSMENT:   Response to treatment: improved gross and fine motor coordination of left upper extremity.    PLAN/RECOMMENDATIONS:   Plan for treatment: therapy treatment to continue next visit.  Planned interventions for next visit: continue with current treatment and neuromuscular re-education (CPT 70145)

## 2022-04-28 NOTE — OP THERAPY PROGRESS SUMMARY
Outpatient Physical Therapy  PROGRESS SUMMARY NOTE      [facility] Physical Therapy [address].  [address]  [place] [address]  Phone:  [phone]  Fax:  [phone]    Date of Visit: 04/28/2022    Patient: Manan Aviles  YOB: 1965  MRN: [id number]     Referring Provider: BANDAR Shahid  [address]  [place] [address]   Referring Diagnosis Unspecified sequelae of cerebral infarction [I69.30]     Visit Diagnoses     ICD-10-CM   1. Late effect of stroke  I69.30       Rehab Potential: good    Progress Report Period: 4/5/22 to 4/28/22    Functional Assessment Used  PT Functional Assessment Tool Used: 5 times STS  PT Functional Assessment Score: 37 sec (with BUEs at arms of chair)  Timed Up and Go (TUG) Test  Time, in seconds (up from chair, walk 3m and return to chair and sit): 35.6 seconds       Objective Findings and Assessment:   Patient progression towards goals: Pt able to amb 200 feet with standard based quad cane with CGA at gait belt.  Gait is asymmetric due to impaired strength and motor control in LLE.  Pt is not independent with ambulation in home, still using w/c for mobility for safety.    Pt demo improvement in gait pattern and isaias, able to perform pre-gait activities with less UE support, and ashutosh increased walking distance since initial PT evaluation.    Objective findings and assessment details: Current objective measures:   Strength:   Lower extremity (left):     Hip flexion: 4-    Hip extension: 4-    Hip abduction: 4-    Hip adduction: 3+    Knee flexion: 4    Knee extension: 4-  Lower extremity (right):     Hip flexion: 5    Hip extension: 4+    Hip abduction: 4+    Hip adduction: 5    Knee flexion: 5    Knee extension: 5    Ankle dorsiflexion: 5    Ankle plantar flexion: 5     TUG with QC and SBA (3 trials): 42 sec, 32 sec, and 33 sec, for score= 35.6sec.  5 times STS with use of BUEs: 37 sec.  Gait with SBA with SBQC 200feet with  decreased L knee flexion in swing and decreased L stance time.    Goals:   Short Term Goals:   1. Pt no longer using w/c for mobility.- NOT MET, CONTINUE.  2. Pt able to walk 200 feet with QC/ SPC with SPV.- NOT MET, CONTINUE.  3. Pt demo more symmetrical gait pattern for safety and minimized fall risk.- NOT MET, CONTINUE.  Short term goal time span:  2-4 weeks      Long Term Goals:    1. Pt able to perform sit <-> stand from standard chair without UE support.- NOT MET, CONTINUE.  2. Pt able to walk in home without AD.- NOT MET, CONTINUE.  3. Pt will demo increased function via improved scores on TUG. - MET, CONTINUE.  Long term goal time span:  6-8 weeks    Plan:   Planned therapy interventions:  Gait Training (CPT 89371), Neuromuscular Re-education (CPT 12771) and Therapeutic Exercise (CPT 35395)  Frequency:  2x week  Duration in visits:  12      Referring provider co-signature:  I have reviewed this plan of care and my co-signature certifies the need for services.     Certification Period: 04/28/2022 to 06/30/22    Physician Signature: ________________________________ Date: ______________

## 2022-04-28 NOTE — OP THERAPY DAILY TREATMENT
Outpatient Physical Therapy  DAILY TREATMENT     [facility] Physical [address].  [address]  [place] [address]  Phone:  [phone]  Fax:  [phone]    Date: 04/28/2022    Patient: Manan Aviles  YOB: 1965  MRN: [id number]     Time Calculation    Start time: 0845  Stop time: 0930 Time Calculation (min): 45 minutes         Chief Complaint: Difficulty Walking    Visit #: 3    SUBJECTIVE:  Pt states he gets sharp pain in R prox anterior thigh sometimes, mostly with increased use or walking.  Still using w/c in home because he is scared to perform ADLs in standing for fear his leg will give out.  Performing walking in home with walker for practice.    OBJECTIVE:  Current objective measures:   Strength:   Lower extremity (left):     Hip flexion: 4-    Hip extension: 4-    Hip abduction: 4-    Hip adduction: 3+    Knee flexion: 4    Knee extension: 4-  Lower extremity (right):     Hip flexion: 5    Hip extension: 4+    Hip abduction: 4+    Hip adduction: 5    Knee flexion: 5    Knee extension: 5    Ankle dorsiflexion: 5    Ankle plantar flexion: 5    TUG with QC and SBA (3 trials): 42 sec, 32 sec, and 33 sec, for score= 35.6sec.  5 times STS with use of BUEs: 37 sec.  Gait with SBA with SBQC 200feet with decreased L knee flexion in swing and decreased L stance time.  PT Functional Assessment Tool Used: 5 times STS  PT Functional Assessment Score: 37 sec (with BUEs at arms of chair)  Timed Up and Go (TUG) Test  Time, in seconds (up from chair, walk 3m and return to chair and sit): 35.6 seconds     Therapeutic Treatments and Modalities:     1. Gait Training (CPT 23096)    2. Neuromuscular Re-education (CPT 00178)    Therapeutic Treatment and Modalities Summary: 69680  -lateral gait at//bars, 2x10 feet bilateral with minimal to no UE support, with SBA/CGA at gait belt.  -forward gait in //bars, with CGA at gait belt, no UE support, [id number].  Pt demo decreased L stance time and  "decreased L step length.  -modified L SLS with R heel tap<->toe tap swing through at //bars with R UE support x20.  Tactile and visual cues and Hari to increase weight bearing over LLE.  -STS without UE support from EOM with R foot on 2\" incline step x10.  06314  -NMES: [place] stim to Left VL and Left VM, 10sec on/10sec off, x7 minutes.  With active LAQ, leg press, or hip flexion in sitting with stim.        Time-based treatments/modalities:    Physical Therapy Timed Treatment Charges  Gait training minutes (CPT 75884): 30 minutes  Neuromusc re-ed, balance, coor, post minutes (CPT 25104): 10 minutes    ASSESSMENT:   Response to treatment: pt demo improved symmetry and isaias of gait.  Limited by LLE instability and apprehension.    PLAN/RECOMMENDATIONS:   Plan for treatment: therapy treatment to continue next visit.  Planned interventions for next visit: continue with current treatment.       "

## 2022-05-02 ENCOUNTER — PHYSICAL THERAPY (OUTPATIENT)
Dept: PHYSICAL THERAPY | Facility: REHABILITATION | Age: 57
End: 2022-05-02
Attending: NURSE PRACTITIONER
Payer: COMMERCIAL

## 2022-05-02 DIAGNOSIS — I69.30 LATE EFFECT OF STROKE: ICD-10-CM

## 2022-05-02 PROCEDURE — 97116 GAIT TRAINING THERAPY: CPT

## 2022-05-02 PROCEDURE — 97110 THERAPEUTIC EXERCISES: CPT

## 2022-05-02 NOTE — OP THERAPY DAILY TREATMENT
"  Outpatient Physical Therapy  DAILY TREATMENT     [facility] Physical [address].  [address]  [place] [address]  Phone:  [phone]  Fax:  [phone]    Date: 05/02/2022    Patient: Manan Aviles  YOB: 1965  MRN: [id number]     Time Calculation    Start time: 0845  Stop time: 0930 Time Calculation (min): 45 minutes         Chief Complaint: Difficulty Walking    Visit #: 4    SUBJECTIVE:  Pt reports he is doing ok.  Continues to have LLE weakness.    OBJECTIVE:        Therapeutic Exercises (CPT 42526):     1. Ball bridges, x15    2. Bridge with hip abd, x10    3. STS with QC with 2\" step under R foot, x10 with CGA, Hari to increase weight acceptance over LLE.    4. Standing HS curls, x10B with BUE support, ataxia with LLE    5. Bent knee fall out with med band, x10 alt R/L    6. Supine L HS curl ball roll, x10 with CGA, verbal cues to bring knee toward nose to avoid excess abd/ ER      Therapeutic Exercise Summary: Add bridges, bridges with hip abd, supine HS ball curl, squat hold to HEP, handout provided.  Pt brought in personal NMES unit, requesting instruction on best use.  Applied electrodes to left VL and VM, chose most appropriate program (program 9- 35Hz, 250usec, 8sec on/8 sec off), and pt increased amplitude to where it was comfortable but strong enough to create muscle recruitment.  Pt verbalized understanding of proper use at home.    Therapeutic Treatments and Modalities:     1. Gait Training (CPT 49168)    Therapeutic Treatment and Modalities Summary: 22816  -gait with QC 100feet and 150 feet with CGA at gait belt-> SBA.  Occasional decreased L foot clearance due to inadequate L knee flexion in swing.  -marching 40 feet, with tape for midline orientation and keeping step width narrow.  -lateral gait with QC, x20 feet bilateral with SBA/CGA at gait belt.  -standing swing through from trailing limb to hip flexion march, x10B with BUE support.  -high squat at " EOM with QC as needed.  Hold 10 sec max, with Hari/CGA and tactile and verbal cues for equal weight bearing R and L LEs.  Pt reports pressure in L thigh muscles.  Difficulty getting over LLE, states he naturally gets his weight over RLE.    Time-based treatments/modalities:    Physical Therapy Timed Treatment Charges  Gait training minutes (CPT 49048): 20 minutes  Therapeutic exercise minutes (CPT 64233): 25 minutes    ASSESSMENT:   Response to treatment: Pt demo improved symmetry in gait, continued difficulty with LLE motor control and weight bearing stability.    PLAN/RECOMMENDATIONS:   Plan for treatment: therapy treatment to continue next visit.  Planned interventions for next visit: continue with current treatment.

## 2022-05-03 ENCOUNTER — OCCUPATIONAL THERAPY (OUTPATIENT)
Dept: OCCUPATIONAL THERAPY | Facility: REHABILITATION | Age: 57
End: 2022-05-03
Attending: NURSE PRACTITIONER
Payer: COMMERCIAL

## 2022-05-03 DIAGNOSIS — I69.30 LATE EFFECT OF STROKE: ICD-10-CM

## 2022-05-03 PROCEDURE — 97112 NEUROMUSCULAR REEDUCATION: CPT

## 2022-05-03 NOTE — OP THERAPY DAILY TREATMENT
Outpatient Occupational Therapy  DAILY TREATMENT     [facility] Occupational [address].  [address]  [place] [address]  Phone:  [phone]  Fax:  [phone]    Date: 05/03/2022    Patient: Manan Aviles  YOB: 1965  MRN: [id number]     Time Calculation  Start time: 0800  Stop time: 0845 Time Calculation (min): 45 minutes         Chief Complaint: Extremity Weakness and Self Care Duties    Visit #: 6    SUBJECTIVE:  I do all my exercises in the morning and I am now helping cut some vegetables.      OBJECTIVE:  Current objective measures:   Active Range of Motion:   Upper extremity (left):     All left upper extremity active range of motion: All within functional limits        Strength:   Upper extremity strength (left):     Shoulder flexion: 3    Shoulder extension:  4    Shoulder abduction: 3    Shoulder adduction: 4    Shoulder external rotation:  4    Shoulder internal rotation: 3    Elbow flexion: 4    Elbow extension: 4    Forearm pronation: 4    Forearm supination: 4    Wrist flexion: 4-    Wrist extension: 4-    Fingers flexion: 4    Fingers extension: 4    Fingers abduction: 3+    Fingers adduction: 3+     Strength Comments:   strength:  R=76 pounds   L= 64 pounds     Pinch strength:              R         L  Lateral   15    12.5  3 point   12       14     Tone, Sensation and Coordination:   Tone:     Left upper extremity muscle tone: Normal     Sensation   Upper extremity (left):     Light touch: Intact    Sharp/dull: Intact     Stereognosis: Intact     Proprioception: Intact      Sensation comments:   Patient feels that the temperature in his fluctuates from hot and cold.      Coordination   Upper extremity (left):     Fine motor: Impaired    Gross motor: Impaired    Slow alternating movements: Impaired    Rapid alternating movements: Impaired    Finger to finger: Impaired    Finger touch to nose: Impaired      Coordination comments:   9 hole peg test:  R=  27 seconds   L= 1 minute and 9 seconds      Cognition:     Orientation: normal to time, normal to place, normal to person and normal to situation    Direction following: two step     Cognition Comments:  Further assessment to be completed      Vision/Perception:     Formal vision perception assessment needed.     Activities of Daily Living:   Transfers/Mobility:     Bed/chair transfers: independent    Sit to stand: supervision    Toilet transfers: supervision    Tub/shower transfers: supervision    Bed mobility: supervision     Toileting:     Toileting: independent    Hygiene: independent    Clothing management: stand by assist    Toileting position: sitting     Bathing:     Bathing: minimum assist    Bathing position: sitting    Washing hair: minimum assist    Washing back: minimum assist    Washing upper body: minimum assist    Washing lower body: minimum assist    Bathing assistive device(s): shower chair     Dressing:     Dressing upper body: minimum assist    Dressing lower body: minimum assist     Grooming:     Brushing teeth or denture care: supervision    Shaving: stand by assist     Feeding:     Feeding: independent     Household Management:     Housekeeping: unable    Laundry: unable    Meal preparation: unable    Medication management: moderate assist    Money management: moderate assist    Shopping: unable    Telephone use: supervision    Writing: supervision     ADL Comments:  Impaired standing tolerance during ADLs.  Normally completes most seated at home.  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         Therapeutic Treatments and Modalities:    1. Neuromuscular Re-education (CPT 94776)    Therapeutic Treatments and Modalities Summary: Worked on gross and fine motor strengthening and coordination NMRE for left UE.  Shoulder arc on high setting to enhance  reaching, horizontal abduction/adduction of shoulder and utilizing lateral pinch.  Working on 12 rings from left to right and then back  9# digi-flex with 10 prolonged squeezes  Flexion/extension exercise stick in vertical and horizontal positions = 10 reps in each position  Velcro roll with cylindrical grasp=5 reps with left hand.   Wrist maze: 2 reps  Medium resistance theraputty  Grooved pegboard focusing on 3 point prehension and manipulation  NU-Step for 15 minutes on resistance 4 to enhance overall strength and endurance.   Fine motor coordination and dexterity activities to enhance fine motor strength/coordination and in hand manipulation  NU-Step on resistance 3 for 15 minutes to enhance muscle strength and endurance       Time-based treatments/modalities:  Neuromusc re-ed minutes (CPT 17072): 45 minutes        Pain rating before treatment: 0  Pain rating after treatment: 0    ASSESSMENT:   Response to treatment: Progressing well with increased participation at home.  Eager to return to work once mobilizing with [facility].  Enhanced bi-manual integration and minimal hiking of left shoulder     PLAN/RECOMMENDATIONS:   Plan for treatment: therapy treatment to continue next visit.  Planned interventions for next visit: continue with current treatment and neuromuscular re-education (CPT 80567)

## 2022-05-04 ENCOUNTER — APPOINTMENT (OUTPATIENT)
Dept: PHYSICAL THERAPY | Facility: REHABILITATION | Age: 57
End: 2022-05-04
Attending: NURSE PRACTITIONER
Payer: COMMERCIAL

## 2022-05-05 ENCOUNTER — OCCUPATIONAL THERAPY (OUTPATIENT)
Dept: OCCUPATIONAL THERAPY | Facility: REHABILITATION | Age: 57
End: 2022-05-05
Attending: NURSE PRACTITIONER
Payer: COMMERCIAL

## 2022-05-05 ENCOUNTER — PHYSICAL THERAPY (OUTPATIENT)
Dept: PHYSICAL THERAPY | Facility: REHABILITATION | Age: 57
End: 2022-05-05
Attending: NURSE PRACTITIONER
Payer: COMMERCIAL

## 2022-05-05 DIAGNOSIS — I69.30 LATE EFFECT OF STROKE: ICD-10-CM

## 2022-05-05 PROCEDURE — 97112 NEUROMUSCULAR REEDUCATION: CPT

## 2022-05-05 PROCEDURE — 97116 GAIT TRAINING THERAPY: CPT

## 2022-05-05 PROCEDURE — 97110 THERAPEUTIC EXERCISES: CPT
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
## 2022-05-05 NOTE — OP THERAPY DAILY TREATMENT
"  Outpatient Physical Therapy  DAILY TREATMENT     [facility] Physical [address].  [address]  [place] [address]  Phone:  [phone]  Fax:  [phone]    Date: 05/05/2022    Patient: Manan Aviles  YOB: 1965  MRN: [id number]     Time Calculation    Start time: 0845  Stop time: 0930 Time Calculation (min): 45 minutes         Chief Complaint: Difficulty Walking    Visit #: 5    SUBJECTIVE:  Pt reports he continues to walk with the walker at home.    OBJECTIVE:        Therapeutic Exercises (CPT 61635):     1. Standing hip abd/add with disc glider with BUE support at //bars, x10B, assist for LLE abd, avoiding compensation with trunk and pelvis    2. Standing hip flex/ext with disc glider with BUE support at //bars, x10B, assist for LLE flex/ext with cues for HS recruitment    3. STS at EOM, x10 without UE support    4. Seated HS curls isometric press into ball, 5sec x10B    5. Seated knee flex/ext AROM foot roll on small med ball, x10B    Therapeutic Treatments and Modalities:     1. Gait Training (CPT 32030)    Therapeutic Treatment and Modalities Summary: 06084  -step through to 2\" step at //bars, with BUE support->R UE support, x10B.  Visual and tactile cues for increased L hip and knee flexion for foot clearance to step.  -gait in //bars, forward and back, light BUE support, 3x10 feet.  Cues to stand tall, look ahead in mirror for visual feedback.  -modified L SLS with R foot on dynadisc at //bars with RUE OH flexion x30 sec, LUE OH flexion x15 sec, L arm abd x15sec.  -alt R/L tap to dynadisc with BUE support at //bars x10.  -gait with QC 100feet and 150 feet with SBA.  Verbal cues to look up from floor occasionally.  -gait with QC, holding three clipboards in LUE, x50 feet with CGA at gait belt.  -gait with QC, holding 1.5kg med ball in L hand, x50 feet with CGA at gait belt.    Time-based treatments/modalities:    Physical Therapy Timed Treatment Charges  Gait " training minutes (CPT 99915): 30 minutes  Therapeutic exercise minutes (CPT 01698): 15 minutes      ASSESSMENT:   Response to treatment: Improved gait stability, requiring decreased physical assist.    PLAN/RECOMMENDATIONS:   Plan for treatment: therapy treatment to continue next visit.  Planned interventions for next visit: continue with current treatment.

## 2022-05-10 ENCOUNTER — OCCUPATIONAL THERAPY (OUTPATIENT)
Dept: OCCUPATIONAL THERAPY | Facility: REHABILITATION | Age: 57
End: 2022-05-10
Attending: NURSE PRACTITIONER
Payer: COMMERCIAL

## 2022-05-10 DIAGNOSIS — I69.30 LATE EFFECT OF STROKE: ICD-10-CM

## 2022-05-10 PROCEDURE — 97112 NEUROMUSCULAR REEDUCATION: CPT

## 2022-05-10 NOTE — OP THERAPY DAILY TREATMENT
Outpatient Occupational Therapy  DAILY TREATMENT     [facility] Occupational [address].  [address]  [place] [address]  Phone:  [phone]  Fax:  [phone]    Date: 05/10/2022    Patient: Manan Aviles  YOB: 1965  MRN: [id number]     Time Calculation  Start time: 0800  Stop time: 0845 Time Calculation (min): 45 minutes         Chief Complaint: Extremity Weakness and Self Care Duties    Visit #: 8    SUBJECTIVE:  No issues noted since last visit.     OBJECTIVE:  Current objective measures:   Active Range of Motion:   Upper extremity (left):     All left upper extremity active range of motion: All within functional limits        Strength:   Upper extremity strength (left):     Shoulder flexion: 3    Shoulder extension:  4    Shoulder abduction: 3    Shoulder adduction: 4    Shoulder external rotation:  4    Shoulder internal rotation: 3    Elbow flexion: 4    Elbow extension: 4    Forearm pronation: 4    Forearm supination: 4    Wrist flexion: 4-    Wrist extension: 4-    Fingers flexion: 4    Fingers extension: 4    Fingers abduction: 3+    Fingers adduction: 3+     Strength Comments:   strength:  R=76 pounds   L= 64 pounds     Pinch strength:              R         L  Lateral   15    12.5  3 point   12       14     Tone, Sensation and Coordination:   Tone:     Left upper extremity muscle tone: Normal     Sensation   Upper extremity (left):     Light touch: Intact    Sharp/dull: Intact     Stereognosis: Intact     Proprioception: Intact      Sensation comments:   Patient feels that the temperature in his fluctuates from hot and cold.      Coordination   Upper extremity (left):     Fine motor: Impaired    Gross motor: Impaired    Slow alternating movements: Impaired    Rapid alternating movements: Impaired    Finger to finger: Impaired    Finger touch to nose: Impaired      Coordination comments:   9 hole peg test:  R= 27 seconds   L= 1 minute and 9 seconds       Cognition:     Orientation: normal to time, normal to place, normal to person and normal to situation    Direction following: two step     Cognition Comments:  Further assessment to be completed      Vision/Perception:     Formal vision perception assessment needed.     Activities of Daily Living:   Transfers/Mobility:     Bed/chair transfers: independent    Sit to stand: supervision    Toilet transfers: supervision    Tub/shower transfers: supervision    Bed mobility: supervision     Toileting:     Toileting: independent    Hygiene: independent    Clothing management: stand by assist    Toileting position: sitting     Bathing:     Bathing: minimum assist    Bathing position: sitting    Washing hair: minimum assist    Washing back: minimum assist    Washing upper body: minimum assist    Washing lower body: minimum assist    Bathing assistive device(s): shower chair     Dressing:     Dressing upper body: minimum assist    Dressing lower body: minimum assist     Grooming:     Brushing teeth or denture care: supervision    Shaving: stand by assist     Feeding:     Feeding: independent     Household Management:     Housekeeping: unable    Laundry: unable    Meal preparation: unable    Medication management: moderate assist    Money management: moderate assist    Shopping: unable    Telephone use: supervision    Writing: supervision     ADL Comments:  Impaired standing tolerance during ADLs.  Normally completes most seated at home.  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         Therapeutic Treatments and Modalities:    1. Neuromuscular Re-education (CPT 12770)    Therapeutic Treatments and Modalities Summary: Worked on gross and fine motor strengthening and coordination NMRE for left UE.  Shoulder arc on high setting to enhance reaching, horizontal abduction/adduction  of shoulder and utilizing lateral pinch.  Working on 12 rings from left to right and then back  9# digi-flex with 10 prolonged squeezes  Flexion/extension exercise stick in vertical and horizontal positions = 10 reps in each position  Velcro roll with cylindrical grasp=5 reps with left hand.   Wrist maze: 2 reps  Medium resistance theraputty  NU-Step for 15 minutes on resistance 4 to enhance overall strength and endurance.   Fine motor coordination and dexterity activities to enhance fine motor strength/coordination and in hand manipulation  NU-Step on resistance 3 for 15 minutes to enhance muscle strength and endurance       Time-based treatments/modalities:  Neuromusc re-ed minutes (CPT 49775): 45 minutes        Pain rating before treatment: 0  Pain rating after treatment: 0    ASSESSMENT:   Response to treatment: Continues to progress with therapy and motivated to participate.  Helping more with cleaning at home    PLAN/RECOMMENDATIONS:   Plan for treatment: therapy treatment to continue next visit.  Planned interventions for next visit: continue with current treatment and neuromuscular re-education (CPT 84555)

## 2022-05-12 ENCOUNTER — OCCUPATIONAL THERAPY (OUTPATIENT)
Dept: OCCUPATIONAL THERAPY | Facility: REHABILITATION | Age: 57
End: 2022-05-12
Attending: NURSE PRACTITIONER
Payer: COMMERCIAL

## 2022-05-12 DIAGNOSIS — I69.30 LATE EFFECT OF STROKE: ICD-10-CM

## 2022-05-12 PROCEDURE — 97110 THERAPEUTIC EXERCISES: CPT

## 2022-05-12 NOTE — OP THERAPY DAILY TREATMENT
Outpatient Occupational Therapy  DAILY TREATMENT     [facility] Occupational [address].  [address]  [place] [address]  Phone:  [phone]  Fax:  [phone]    Date: 05/12/2022    Patient: Manan Aviles  YOB: 1965  MRN: [id number]     Time Calculation  Start time: 0800  Stop time: 0845 Time Calculation (min): 45 minutes         Chief Complaint: Extremity Weakness and Self Care Duties    Visit #: 9    SUBJECTIVE:  I am so tired lately and having headaches    OBJECTIVE:  Current objective measures:   Active Range of Motion:   Upper extremity (left):     All left upper extremity active range of motion: All within functional limits        Strength:   Upper extremity strength (left):     Shoulder flexion: 3    Shoulder extension:  4    Shoulder abduction: 3    Shoulder adduction: 4    Shoulder external rotation:  4    Shoulder internal rotation: 3    Elbow flexion: 4    Elbow extension: 4    Forearm pronation: 4    Forearm supination: 4    Wrist flexion: 4-    Wrist extension: 4-    Fingers flexion: 4    Fingers extension: 4    Fingers abduction: 3+    Fingers adduction: 3+     Strength Comments:   strength:  R=76 pounds   L= 64 pounds     Pinch strength:              R         L  Lateral   20    18  3 point   20      17     Tone, Sensation and Coordination:   Tone:     Left upper extremity muscle tone: Normal     Sensation   Upper extremity (left):     Light touch: Intact    Sharp/dull: Intact     Stereognosis: Intact     Proprioception: Intact      Sensation comments:   Patient feels that the temperature in his fluctuates from hot and cold.      Coordination   Upper extremity (left):     Fine motor: Impaired    Gross motor: Impaired    Slow alternating movements: Impaired    Rapid alternating movements: Impaired    Finger to finger: Impaired    Finger touch to nose: Impaired      Coordination comments:   9 hole peg test:  R= 27 seconds   L= 1 minute and 5 seconds       Cognition:     Orientation: normal to time, normal to place, normal to person and normal to situation    Direction following: two step     Cognition Comments:  Further assessment to be completed      Vision/Perception:     Formal vision perception assessment needed.     Activities of Daily Living:   Transfers/Mobility:     Bed/chair transfers: independent    Sit to stand: supervision    Toilet transfers: supervision    Tub/shower transfers: supervision    Bed mobility: supervision     Toileting:     Toileting: independent    Hygiene: independent    Clothing management: stand by assist    Toileting position: sitting     Bathing:     Bathing: minimum assist    Bathing position: sitting    Washing hair: minimum assist    Washing back: minimum assist    Washing upper body: minimum assist    Washing lower body: minimum assist    Bathing assistive device(s): shower chair     Dressing:     Dressing upper body: minimum assist    Dressing lower body: minimum assist     Grooming:     Brushing teeth or denture care: supervision    Shaving: stand by assist     Feeding:     Feeding: independent     Household Management:     Housekeeping: unable    Laundry: unable    Meal preparation: unable    Medication management: moderate assist    Money management: moderate assist    Shopping: unable    Telephone use: supervision    Writing: supervision     ADL Comments:  Impaired standing tolerance during ADLs.  Normally completes most seated at home.  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 Neuromuscular Re-education (CPT 91319)    Therapeutic Treatments and Modalities Summary: Worked on gross and fine motor strengthening and coordination NMRE for left UE.  Instructed on use of dowel exercises for B UEs to enhance strength, core stability  and triceps strength.  Patient and wife able to return demonstrate exercises.  Handout also issued to completed 3 x a day with 10 reps of each exercise  NU-Step for 15 minutes on resistance 4 to enhance overall strength and endurance.   Fine motor coordination and dexterity activities to enhance fine motor strength/coordination and in hand manipulation     Patient requested some water and OTR noted that patient was coughing after each sip.  Instructed on chin tuck technique and taking smaller sips.  Patient able to return demonstrate.  Patient explained that this is why he is not drinking as much fluids.  Continued dizziness, vitals taken and within normal limits.  May be due to dehydration??    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  OTR to contact Dr. Cheng regarding swallowing issue.      PLAN/RECOMMENDATIONS:   Plan for treatment: therapy treatment to continue next visit.  Planned interventions for next visit: neuromuscular re-education (CPT 30908)

## 2022-05-12 NOTE — OP THERAPY PROGRESS SUMMARY
Outpatient Occupational Therapy  PROGRESS SUMMARY NOTE    [facility] Occup[facility] Therapy [address].  [address]  [place] [address]  Phone:  [phone]  Fax:  [phone]    Date of Visit: 05/12/2022    Patient: Manan Aviles  YOB: 1965  MRN: [id number]     Referring Provider: BANDAR Shahid [address] [id number]  [place] [address]   Referring Diagnosis Unspecified sequelae of cerebral infarction [I69.30]     Visit Diagnoses     ICD-10-CM   1. Late effect of stroke  I69.30       Rehab Potential: excellent    Progress Report Period: 4/26/22-5/12/22    Functional Assessment Used:  UEFI = 23/80          Objective Findings and Assessment:   Patient progression towards goals: Patient continues to actively participate in OT 2 x a week with very supportive wife who participates in HEP with patient.  Patient continues to improve with therapy and has improved endurance, increased strength and function of L UE and doing well with pre-driving intervention with use of the  simulator.  Patient would benefit with continued OT intervention to further enhance left UE function and overall independence to return to prior level of function.      Objective findings and assessment details: Current objective measures:   Active Range of Motion:   Upper extremity (left):     All left upper extremity active range of motion: All within functional limits        Strength:   Upper extremity strength (left):     Shoulder flexion: 3    Shoulder extension:  4    Shoulder abduction: 3    Shoulder adduction: 4    Shoulder external rotation:  4    Shoulder internal rotation: 3    Elbow flexion: 4    Elbow extension: 4    Forearm pronation: 4    Forearm supination: 4    Wrist flexion: 4-    Wrist extension: 4-    Fingers flexion: 4    Fingers extension: 4    Fingers abduction: 3+    Fingers adduction: 3+     Strength Comments:   strength:  R=76 pounds   L= 64 pounds     Pinch  strength:              R         L  Lateral   20    18  3 point   20      17     Tone, Sensation and Coordination:   Tone:     Left upper extremity muscle tone: Normal     Sensation   Upper extremity (left):     Light touch: Intact    Sharp/dull: Intact     Stereognosis: Intact     Proprioception: Intact      Sensation comments:   Patient feels that the temperature in his fluctuates from hot and cold.      Coordination   Upper extremity (left):     Fine motor: Impaired    Gross motor: Impaired    Slow alternating movements: Impaired    Rapid alternating movements: Impaired    Finger to finger: Impaired    Finger touch to nose: Impaired      Coordination comments:   9 hole peg test:  R= 27 seconds   L= 1 minute and 5 seconds      Cognition:     Orientation: normal to time, normal to place, normal to person and normal to situation    Direction following: two step     Cognition Comments:  Further assessment to be completed      Vision/Perception:     Formal vision perception assessment needed.     Activities of Daily Living:   Transfers/Mobility:     Bed/chair transfers: independent    Sit to stand: supervision    Toilet transfers: supervision    Tub/shower transfers: supervision    Bed mobility: supervision     Toileting:     Toileting: independent    Hygiene: independent    Clothing management: stand by assist    Toileting position: sitting     Bathing:     Bathing: minimum assist    Bathing position: sitting    Washing hair: minimum assist    Washing back: minimum assist    Washing upper body: minimum assist    Washing lower body: minimum assist    Bathing assistive device(s): shower chair     Dressing:     Dressing upper body: minimum assist    Dressing lower body: minimum assist     Grooming:     Brushing teeth or denture care: supervision    Shaving: stand by assist     Feeding:     Feeding: independent     Household Management:     Housekeeping: max A     Laundry: min A seated    Meal preparation: max A      Medication management: min A    Money management: Min  assist    Shopping: unable    Telephone use: supervision    Writing: supervision     ADL Comments:  Impaired standing tolerance during ADLs.  Normally completes most seated at home.  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   Short term goal timespan:  1-2 weeks    Long Term Goals:   Patient will be Mod I with lower body dressing and bathing  Patient will be Set up/supervision for light meal prep while standing at kitchen counter  Patient will be able to engage in activity for at least 15minutes while standing  Patient will increase left  strength by 15 pounds  Patient will be able to type at least 15 words per minute  Patient will score at least 35/80 on the UEFI  Long term goal timespan:  2-4 weeks    Plan:   Planned therapy interventions:  Neuromuscular Re-education (CPT 27362)  Frequency:  2x week  Duration in weeks:  4  Duration in visits:  8      Referring provider co-signature:  I have reviewed this plan of care and my co-signature certifies the need for services.    Certification Period: 05/12/2022 to 06/09/22    Physician Signature: ________________________________ Date: ______________

## 2022-05-17 ENCOUNTER — OCCUPATIONAL THERAPY (OUTPATIENT)
Dept: OCCUPATIONAL THERAPY | Facility: REHABILITATION | Age: 57
End: 2022-05-17
Attending: NURSE PRACTITIONER
Payer: COMMERCIAL

## 2022-05-17 ENCOUNTER — PHYSICAL THERAPY (OUTPATIENT)
Dept: PHYSICAL THERAPY | Facility: REHABILITATION | Age: 57
End: 2022-05-17
Attending: NURSE PRACTITIONER
Payer: COMMERCIAL

## 2022-05-17 DIAGNOSIS — I69.30 LATE EFFECT OF STROKE: ICD-10-CM

## 2022-05-17 PROCEDURE — 97110 THERAPEUTIC EXERCISES: CPT

## 2022-05-17 PROCEDURE — 97116 GAIT TRAINING THERAPY: CPT

## 2022-05-17 PROCEDURE — 97112 NEUROMUSCULAR REEDUCATION: CPT

## 2022-05-17 NOTE — OP THERAPY DAILY TREATMENT
Outpatient Occupational Therapy  DAILY TREATMENT     [facility] Therapy [address].  [address]  [place] [address]  Phone:  [phone]  Fax:  [phone]    Date: 05/17/2022    Patient: Manan Aviles  YOB: 1965  MRN: [id number]     Time Calculation  Start time: 0800  Stop time: 0845 Time Calculation (min): 45 minutes         Chief Complaint: Extremity Weakness and Self Care Duties    Visit #: 10    SUBJECTIVE:  I feel drained at times, it takes so much effort.      OBJECTIVE:  Current objective measures:   Active Range of Motion:   Upper extremity (left):     All left upper extremity active range of motion: All within functional limits        Strength:   Upper extremity strength (left):     Shoulder flexion: 3    Shoulder extension:  4    Shoulder abduction: 3    Shoulder adduction: 4    Shoulder external rotation:  4    Shoulder internal rotation: 3    Elbow flexion: 4    Elbow extension: 4    Forearm pronation: 4    Forearm supination: 4    Wrist flexion: 4-    Wrist extension: 4-    Fingers flexion: 4    Fingers extension: 4    Fingers abduction: 3+    Fingers adduction: 3+     Strength Comments:   strength:  R=76 pounds   L= 64 pounds     Pinch strength:              R         L  Lateral   20    18  3 point   20      17     Tone, Sensation and Coordination:   Tone:     Left upper extremity muscle tone: Normal     Sensation   Upper extremity (left):     Light touch: Intact    Sharp/dull: Intact     Stereognosis: Intact     Proprioception: Intact      Sensation comments:   Patient feels that the temperature in his fluctuates from hot and cold.      Coordination   Upper extremity (left):     Fine motor: Impaired    Gross motor: Impaired    Slow alternating movements: Impaired    Rapid alternating movements: Impaired    Finger to finger: Impaired    Finger touch to nose: Impaired      Coordination comments:   9 hole peg test:  R= 27 seconds   L= 1 minute and 5  seconds      Cognition:     Orientation: normal to time, normal to place, normal to person and normal to situation    Direction following: two step     Cognition Comments:  Further assessment to be completed      Vision/Perception:     Formal vision perception assessment needed.     Activities of Daily Living:   Transfers/Mobility:     Bed/chair transfers: independent    Sit to stand: supervision    Toilet transfers: supervision    Tub/shower transfers: supervision    Bed mobility: supervision     Toileting:     Toileting: independent    Hygiene: independent    Clothing management: stand by assist    Toileting position: sitting     Bathing:     Bathing: minimum assist    Bathing position: sitting    Washing hair: minimum assist    Washing back: minimum assist    Washing upper body: minimum assist    Washing lower body: minimum assist    Bathing assistive device(s): shower chair     Dressing:     Dressing upper body: minimum assist    Dressing lower body: minimum assist     Grooming:     Brushing teeth or denture care: supervision    Shaving: stand by assist     Feeding:     Feeding: independent     Household Management:     Housekeeping: max A     Laundry: min A seated    Meal preparation: max A     Medication management: min A    Money management: Min  assist    Shopping: unable    Telephone use: supervision    Writing: supervision     ADL Comments:  Impaired standing tolerance during ADLs.  Normally completes most seated at home.  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 Neuromuscular Re-education (CPT 25778)    Therapeutic Treatments and Modalities Summary: Worked on gross and fine motor strengthening and coordination NMRE for left UE.  Flex/ext exercise stick in horizontal and vertical postions = 10 reps in each  direction  Wrist maze = 4 reps  9# digi-flex = 10 squeezes with 3-5 second hold  Pegboard solitaire promoting 3 point pinch and horizontal abduction and adduction of shoulder  Shoulder arc on high position   NU-Step for 15 minutes on resistance 4 to enhance overall strength and endurance.   Fine motor coordination and dexterity activities to enhance fine motor strength/coordination and in hand manipulation         Time-based treatments/modalities:  Neuromusc re-ed minutes (CPT 42830): 45 minutes        Pain rating before treatment: 0  Pain rating after treatment: 0    ASSESSMENT:   Response to treatment: improved active elbow extension during tasks.     PLAN/RECOMMENDATIONS:   Plan for treatment: therapy treatment to continue next visit.  Planned interventions for next visit: continue with current treatment and neuromuscular re-education (CPT 31354)

## 2022-05-17 NOTE — OP THERAPY DAILY TREATMENT
Outpatient Physical Therapy  DAILY TREATMENT     [facility] Physical [address]  [address]  [place] [address]  Phone:  [phone]  Fax:  [phone]    Date: 05/17/2022    Patient: Manan Aviles  YOB: 1965  MRN: [id number]     Time Calculation    Start time: 0845  Stop time: 0930 Time Calculation (min): 45 minutes         Chief Complaint: Difficulty Walking    Visit #: 6    SUBJECTIVE:  Would like to walk with QC at home.  States he did a little with his wife, but fears his left leg is too unsteady and may give out or fall.    OBJECTIVE:        Therapeutic Exercises (CPT 59972):     1. Shuttle leg press, 5 cords x15 BLEs, 1 cord LLE x15, 2 cords LLE x6 (intermittent Hari for concentric, no assist for ecc)    3. STS at EOM, x10 without UE support, Max cueing and facil for increased weight acceptance through LLE, for staying midline over BLEs    4. Seated HS curls with extra heavy resistance band, x15L    5. Seated LAQ with extra heavy resistance band, x15L    6. Seated L hip IR into extra heavy resistance band, x10 with 5 sec hold    7. Seated L hip ER into extra heavy resistance band, x10 with 5 sec hold    Therapeutic Treatments and Modalities:     1. Gait Training (CPT 23194)    Therapeutic Treatment and Modalities Summary: 96876  -step through to dynadisc at rail with BUE support x10B.  Tactile and verbal cues and manual facil for increased weight shift over L stance limb and active L knee and hip ext in stance.  -med-lat taps to dynadiscs with BUE support at rail x10B.  Tactile and verbal cues for increased weight shift over L stance limb.  -AP weight shift in stride stance with QC x10B with CGA at gait belt.  -lateral standing weight shift x10B without AD, CGA at gait belt.  Hari/ facil for increased weight shift toward L.  -gait with QC x30 feet, x100 feet, with SBA.  With fatigue, pt demo decreased L foot clearance, but no LOB or contact with floor in  midswing.  -modified L SLS with R foot on dynadisc rail with BUE support, then LUE support, with CGA at gait belt.    Pt states his backyard has a cement path he can walk with the quad cane with his wife for more practice.  Encouraged pt to walk daily with QC where he feels safe, whether in hallway, at kitchen counter, or with wife in backyard.    Time-based treatments/modalities:    Physical Therapy Timed Treatment Charges  Gait training minutes (CPT 45621): 30 minutes  Therapeutic exercise minutes (CPT 50764): 15 minutes      ASSESSMENT:   Response to treatment: Pt demo improved gait symmetry and LLE stance with cues.  PT apprehensive to trust LLE with his weight in standing.    PLAN/RECOMMENDATIONS:   Plan for treatment: therapy treatment to continue next visit .  Planned interventions for next visit: continue with current treatment.

## 2022-05-20 ENCOUNTER — OCCUPATIONAL THERAPY (OUTPATIENT)
Dept: OCCUPATIONAL THERAPY | Facility: REHABILITATION | Age: 57
End: 2022-05-20
Attending: NURSE PRACTITIONER
Payer: COMMERCIAL

## 2022-05-20 ENCOUNTER — PHYSICAL THERAPY (OUTPATIENT)
Dept: PHYSICAL THERAPY | Facility: REHABILITATION | Age: 57
End: 2022-05-20
Attending: NURSE PRACTITIONER
Payer: COMMERCIAL

## 2022-05-20 DIAGNOSIS — I69.30 LATE EFFECT OF STROKE: ICD-10-CM

## 2022-05-20 PROCEDURE — 97110 THERAPEUTIC EXERCISES: CPT

## 2022-05-20 PROCEDURE — 97112 NEUROMUSCULAR REEDUCATION: CPT

## 2022-05-20 PROCEDURE — 97116 GAIT TRAINING THERAPY: CPT

## 2022-05-20 NOTE — OP THERAPY DAILY TREATMENT
Outpatient Physical Therapy  DAILY TREATMENT     [facility] Physical Therapy [address]  [address]  [place] [address]  Phone:  [phone]  Fax:  [phone]    Date: 05/20/2022    Patient: Manan Aviles  YOB: 1965  MRN: [id number]     Time Calculation    Start time: 0845  Stop time: 0930 Time Calculation (min): 45 minutes         Chief Complaint: Difficulty Walking    Visit #: 7    SUBJECTIVE:  Pt states he walked with QC once at home with his wife.  States that sometimes his L foot gets caught on floor in swing.    OBJECTIVE:  Current objective measures: 5times STS= 36 sec with use of BUEs from arm chair  TUG: (2 trials) with QC= 35sec and 34 sec for score 34.5 sec.  Gait with QC SPV 66feet (20.1m) in 59 sec for avg gait speed= 0.34 m/sec. Pt demo good advancement of QC simultaneously with L swing.  Strength:   Lower extremity (left):     Hip flexion: 4    Hip extension: 4-    Hip abduction: 4    Hip adduction: 4    Knee flexion: 4    Knee extension: 4  Lower extremity (right):     Hip flexion: 5    Hip extension: 5    Hip abduction: 5    Hip adduction: 5    Knee flexion: 5    Knee extension: 5    Ankle dorsiflexion: 5    Ankle plantar flexion: 5        Therapeutic Exercises (CPT 61401):     1. Shuttle leg press, 5 cords x20 BLEs, 2 cord LLE x20    4. Standing hip add hold, 3x10 sec with med resistance band    5. Standing hip abd hold, 3x10 sec with med resistance band      Therapeutic Exercise Summary: Add standing hip abd and hip flex/add hold with band looped around legs to HEP, handout provided.    Therapeutic Treatments and Modalities:     1. Gait Training (CPT 50789)    Therapeutic Treatment and Modalities Summary: 41542  -Gait with QC 130feet with SPV/Zac.  -modified SLS with opp leg march from LUXA x10B with BUE -> LUE support at rail.  -L swing through over ankle weight with BUE support x10.  Emphasis on increased HS recruitment for knee flexion for foot  clearance.  -gait 60feet without AD, with CGA at gait belt.  -lateral gait [id number] B without AD, with CGA at gait belt.    Time-based treatments/modalities:    Physical Therapy Timed Treatment Charges  Gait training minutes (CPT 85729): 30 minutes  Therapeutic exercise minutes (CPT 14071): 15 minutes    ASSESSMENT:   Response to treatment: Pt demo improved LLE strength and motor control as evidenced by gait without AD in today's session.  Recommend continued emphasis on L hip and leg muscle endurance for SL stance stability.    PLAN/RECOMMENDATIONS:   Plan for treatment: therapy treatment to continue next visit.  Planned interventions for next visit: continue with current treatment.

## 2022-05-20 NOTE — OP THERAPY DAILY TREATMENT
Outpatient Occupational Therapy  DAILY TREATMENT     [facility] Occupational [address]  [address]  [place] [address]  Phone:  [phone]  Fax:  [phone]    Date: 05/20/2022    Patient: Manan Aviles  YOB: 1965  MRN: [id number]     Time Calculation  Start time: 0800  Stop time: 0845 Time Calculation (min): 45 minutes         Chief Complaint: Extremity Weakness and Self Care Duties    Visit #: 11    SUBJECTIVE:  I am still working on my typing at home    OBJECTIVE:  Current objective measures:   Active Range of Motion:   Upper extremity (left):     All left upper extremity active range of motion: All within functional limits        Strength:   Upper extremity strength (left):     Shoulder flexion: 3    Shoulder extension:  4    Shoulder abduction: 3    Shoulder adduction: 4    Shoulder external rotation:  4    Shoulder internal rotation: 3    Elbow flexion: 4    Elbow extension: 4    Forearm pronation: 4    Forearm supination: 4    Wrist flexion: 4-    Wrist extension: 4-    Fingers flexion: 4    Fingers extension: 4    Fingers abduction: 3+    Fingers adduction: 3+     Strength Comments:   strength:  R=76 pounds   L= 64 pounds     Pinch strength:              R         L  Lateral   20    18  3 point   20      17     Tone, Sensation and Coordination:   Tone:     Left upper extremity muscle tone: Normal     Sensation   Upper extremity (left):     Light touch: Intact    Sharp/dull: Intact     Stereognosis: Intact     Proprioception: Intact      Sensation comments:   Patient feels that the temperature in his fluctuates from hot and cold.      Coordination   Upper extremity (left):     Fine motor: Impaired    Gross motor: Impaired    Slow alternating movements: Impaired    Rapid alternating movements: Impaired    Finger to finger: Impaired    Finger touch to nose: Impaired      Coordination comments:   9 hole peg test:  R= 27 seconds   L= 1 minute and 5 seconds       Cognition:     Orientation: normal to time, normal to place, normal to person and normal to situation    Direction following: two step     Cognition Comments:  Further assessment to be completed      Vision/Perception:     Formal vision perception assessment needed.     Activities of Daily Living:   Transfers/Mobility:     Bed/chair transfers: independent    Sit to stand: supervision    Toilet transfers: supervision    Tub/shower transfers: supervision    Bed mobility: supervision     Toileting:     Toileting: independent    Hygiene: independent    Clothing management: stand by assist    Toileting position: sitting     Bathing:     Bathing: minimum assist    Bathing position: sitting    Washing hair: minimum assist    Washing back: minimum assist    Washing upper body: minimum assist    Washing lower body: minimum assist    Bathing assistive device(s): shower chair     Dressing:     Dressing upper body: minimum assist    Dressing lower body: minimum assist     Grooming:     Brushing teeth or denture care: supervision    Shaving: stand by assist     Feeding:     Feeding: independent     Household Management:     Housekeeping: max A     Laundry: min A seated    Meal preparation: max A     Medication management: min A    Money management: Min  assist    Shopping: unable    Telephone use: supervision    Writing: supervision     ADL Comments:  Impaired standing tolerance during ADLs.  Normally completes most seated at home.  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 Neuromuscular Re-education (CPT 75897)    Therapeutic Treatments and Modalities Summary: Worked on gross and fine motor strengthening and coordination NMRE for left UE.  Anel pegboard to enhance fine motor manipulation of left hand  9# digi-flex = 10  squeezes with 3-5 second hold  Shoulder arc on high position utilizing lateral pinch and controlled shoulder movement  NU-Step for 15 minutes on resistance 4 to enhance overall strength and endurance.   UBE while standing on resistance 2 for 5 minutes, then seated break and then for another 8 minutes while standing to enhance standing tolerance during ADLs.          Time-based treatments/modalities:  Neuromusc re-ed minutes (CPT 67401): 45 minutes        Pain rating before treatment: 0  Pain rating after treatment: 0    ASSESSMENT:   Response to treatment: enhanced endurance today in therapy  Awaiting authorization for more visits.      PLAN/RECOMMENDATIONS:   Plan for treatment: therapy treatment to continue next visit.  Planned interventions for next visit: continue with current treatment and prosthetic training (CPT 52174)

## 2022-05-20 NOTE — OP THERAPY PROGRESS SUMMARY
Outpatient Physical Therapy  PROGRESS SUMMARY NOTE      [facility] Physical Therapy [address] [place]  [address]  [place] [address]  Phone:  [phone]  Fax:  [phone]    Date of Visit: 05/20/2022    Patient: Manan Aviles  YOB: 1965  MRN: [id number]     Referring Provider: BANDAR Shahid  [address]  [place] [address]   Referring Diagnosis Unspecified sequelae of cerebral infarction [I69.30]     Visit Diagnoses     ICD-10-CM   1. Late effect of stroke  I69.30       Rehab Potential: excellent    Progress Report Period: 4/28- 5/20/22    Functional Assessment Used  PT Functional Assessment Tool Used: 5times STS  PT Functional Assessment Score: 35 sec  Timed Up and Go (TUG) Test  Time, in seconds (up from chair, walk 3m and return to chair and sit): 34.5 seconds       Objective Findings and Assessment:   Patient progression towards goals: Pt states he walks a little with QC for practice at home, with his wife.  Still feels unconfident on LLE, and L foot catches the floor sometimes.    Objective findings and assessment details: 5times STS= 36 sec with use of BUEs from arm chair  TUG: (2 trials) with QC= 35sec and 34 sec for score 34.5 sec.  Gait with QC SPV 66feet (20.1m) in 59 sec for avg gait speed= 0.34 m/sec. Pt demo good advancement of QC simultaneously with L swing.  Strength:   Lower extremity (left):     Hip flexion: 4    Hip extension: 4-    Hip abduction: 4    Hip adduction: 4    Knee flexion: 4    Knee extension: 4  Lower extremity (right):     Hip flexion: 5    Hip extension: 5    Hip abduction: 5    Hip adduction: 5    Knee flexion: 5    Knee extension: 5    Ankle dorsiflexion: 5    Ankle plantar flexion: 5    Goals:   Short Term Goals:   1. Pt no longer using w/c for mobility.- NOT MET, CONTINUE.  2. Pt able to walk 200 feet with QC/ SPC with SPV.- NEARLY MET, DC.  3. Pt demo more symmetrical gait pattern for safety and minimized fall risk.-  PARTIALLY MET, CONTINUE.  NEW STGs:  4. Pt able to walk 400 feet with QC/SPC Zac in home and in community.  Short term goal time span:  2-4 weeks      Long Term Goals:    1. Pt able to perform sit <-> stand from standard chair without UE support.- NOT MET, CONTINUE.  2. Pt able to walk in home without AD.- NOT MET, CONTINUE.  3. Pt will demo increased function via improved scores on TUG. - MET, CONTINUE.  Long term goal time span:  6-8 weeks    Plan:   Planned therapy interventions:  Gait Training (CPT 91342) and Therapeutic Exercise (CPT 20017)  Frequency:  2x week  Duration in visits:  10      Referring provider co-signature:  I have reviewed this plan of care and my co-signature certifies the need for services.     Certification Period: 05/20/2022 to 07/15/22    Physician Signature: ________________________________ Date: ______________

## 2022-05-24 ENCOUNTER — PHYSICAL THERAPY (OUTPATIENT)
Dept: PHYSICAL THERAPY | Facility: REHABILITATION | Age: 57
End: 2022-05-24
Attending: NURSE PRACTITIONER
Payer: COMMERCIAL

## 2022-05-24 DIAGNOSIS — I69.30 LATE EFFECT OF STROKE: ICD-10-CM

## 2022-05-24 PROCEDURE — 97116 GAIT TRAINING THERAPY: CPT

## 2022-05-24 PROCEDURE — 97110 THERAPEUTIC EXERCISES: CPT

## 2022-05-24 NOTE — OP THERAPY DAILY TREATMENT
Outpatient Physical Therapy  DAILY TREATMENT     [facility] Physical [address].  [address]  [place] [address]  Phone:  [phone]  Fax:  [phone]    Date: 05/24/2022    Patient: Manan Aviles  YOB: 1965  MRN: [id number]     Time Calculation    Start time: 0845  Stop time: 0930 Time Calculation (min): 45 minutes         Chief Complaint: Difficulty Walking    Visit #: 8    SUBJECTIVE:  Feeling tired today.  Did three laps walking in backyard yesterday.    OBJECTIVE:        Therapeutic Exercises (CPT 52236):     1. Shuttle leg press, 4 cords x20 BLEs, 2 cord LLE x20    2. Standing hip ext from march with heavy band, x15B with BUE support    3. Standing hip flexion, x10B with BUE support    Therapeutic Treatments and Modalities:     1. Gait Training (CPT 83735)    Therapeutic Treatment and Modalities Summary: 80329  -Gait on tape without AD: forward, lateral, lateral zigzag, marching, x20 feet all with CGA at gait belt.  -modified SLS with opp foot on dynadisc x1min B.  With head turns, decrease UE to no UE support with CGA at gait belt.  -gait with head turns x70 feet with CGA at gaitbelt.  -gait carrying clipboards x70 feet with CGA at gait belt.  -gait without AD, with light CGA at gait belt, [id number].  -STS from EOM with clipboards in hands x7- lateral L knee pain reported. Mirror and tactile cues for midline orientation, to increase LLE weight acceptance and avoid R trunk lean over RLE.  Emphasis on slow on descent and tall standing with full hip extension at top of stand.    Time-based treatments/modalities:    Physical Therapy Timed Treatment Charges  Gait training minutes (CPT 11522): 30 minutes  Therapeutic exercise minutes (CPT 81385): 10 minutes    ASSESSMENT:   Response to treatment: Pt demo improved ashutosh for standing and gait activities without UE support or AD.  Pt will benefit from continued closed chain and gait/ pre-gait activities to improve LLE  stability and SL stance.    PLAN/RECOMMENDATIONS:   Plan for treatment: therapy treatment to continue next visit.  Planned interventions for next visit: continue with current treatment.

## 2022-05-25 NOTE — OP THERAPY DAILY TREATMENT
Outpatient Physical Therapy  DAILY TREATMENT     [facility] Physical [address]  [address]  [place] [address]  Phone:  [phone]  Fax:  [phone]    Date: 05/26/2022    Patient: Manan Aviles  YOB: 1965  MRN: [id number]     Time Calculation    Start time: 1035  Stop time: 1114 Time Calculation (min): 39 minutes         Chief Complaint: Difficulty Walking and Loss Of Balance    Visit #: 9    SUBJECTIVE:  Complains of some left knee pain/stiffness yesterday. Walks occasionally at home, when others are there, without AD but near walls.     OBJECTIVE:        Therapeutic Treatments and Modalities:     1. Gait Training (CPT 70421)    2. Neuromuscular Re-education (CPT 99609)    Therapeutic Treatment and Modalities Summary: 15098  Gait training for improving stabilty and endurance. Pt mbualted 6 min wihtout rest with close SBA and no AD approx 450 feet. Cues to increase arm swing. Noted min/no head turns.  For improving dynamic gait stabilty ambulated without AD with CGA wit cues for horizontal head turns. 2 instances mod A for LOB. 1 instance max/total A to prevent fall in which pt Rt LE buckled and pt unable to recover on left LE. With assistance able to maintain standing position and continue walking. X 250 feet total.       53416  For improving dynamic balance and decreasing pt fall risk  Normal GILLIAN x 30 sec each SBA, horizontal head turns,  EC, EC horizontal head turns, EC vertical head turns, . Occasional touch A fro EC.   split stance x 30 sec each  SBA. Vertical head turns, horizontal head turns, EC occasional min/mod A for LOB when Left Le positioned posteriorly.     Time-based treatments/modalities:    Physical Therapy Timed Treatment Charges  Gait training minutes (CPT 30602): 27 minutes  Neuromusc re-ed, balance, coor, post minutes (CPT 32646): 12 minutes    ASSESSMENT:   Response to treatment:Pt continues to demonstrate dynamic balance and gait impairments  including ambulating with head turns without AD. Pt demo'd improved endurance ambulating without ASD and iis continuing to make appropriate progress towards goals to maximize pt function.     PLAN/RECOMMENDATIONS:   Plan for treatment: therapy treatment to continue next visit.  Planned interventions for next visit: continue with current treatment.

## 2022-05-26 ENCOUNTER — PHYSICAL THERAPY (OUTPATIENT)
Dept: PHYSICAL THERAPY | Facility: REHABILITATION | Age: 57
End: 2022-05-26
Attending: NURSE PRACTITIONER
Payer: COMMERCIAL

## 2022-05-26 ENCOUNTER — OCCUPATIONAL THERAPY (OUTPATIENT)
Dept: OCCUPATIONAL THERAPY | Facility: REHABILITATION | Age: 57
End: 2022-05-26
Attending: NURSE PRACTITIONER
Payer: COMMERCIAL

## 2022-05-26 ENCOUNTER — APPOINTMENT (OUTPATIENT)
Dept: MEDICAL GROUP | Facility: OTHER | Age: 57
End: 2022-05-26
Payer: COMMERCIAL

## 2022-05-26 DIAGNOSIS — I69.30 LATE EFFECT OF STROKE: ICD-10-CM

## 2022-05-26 PROCEDURE — 97116 GAIT TRAINING THERAPY: CPT

## 2022-05-26 PROCEDURE — 97112 NEUROMUSCULAR REEDUCATION: CPT

## 2022-05-26 NOTE — OP THERAPY DAILY TREATMENT
Outpatient Occupational Therapy  DAILY TREATMENT     [facility] Occupational Therapy [address].  [address]  [place] [address]  Phone:  [phone]  Fax:  [phone]    Date: 05/26/2022    Patient: Manan Aviles  YOB: 1965  MRN: [id number]     Time Calculation  Start time: 1115  Stop time: 1200 Time Calculation (min): 45 minutes         Chief Complaint: Extremity Weakness and Self Care Duties    Visit #: 12    SUBJECTIVE:  It's getting easier to open a jar now    OBJECTIVE:  Current objective measures:   Active Range of Motion:   Upper extremity (left):     All left upper extremity active range of motion: All within functional limits        Strength:   Upper extremity strength (left):     Shoulder flexion: 3    Shoulder extension:  4    Shoulder abduction: 3    Shoulder adduction: 4    Shoulder external rotation:  4    Shoulder internal rotation: 3    Elbow flexion: 4    Elbow extension: 4    Forearm pronation: 4    Forearm supination: 4    Wrist flexion: 4-    Wrist extension: 4-    Fingers flexion: 4    Fingers extension: 4    Fingers abduction: 3+    Fingers adduction: 3+     Strength Comments:   strength:  R=76 pounds   L= 64 pounds     Pinch strength:              R         L  Lateral   20    18  3 point   20      17     Tone, Sensation and Coordination:   Tone:     Left upper extremity muscle tone: Normal     Sensation   Upper extremity (left):     Light touch: Intact    Sharp/dull: Intact     Stereognosis: Intact     Proprioception: Intact      Sensation comments:   Patient feels that the temperature in his fluctuates from hot and cold.      Coordination   Upper extremity (left):     Fine motor: Impaired    Gross motor: Impaired    Slow alternating movements: Impaired    Rapid alternating movements: Impaired    Finger to finger: Impaired    Finger touch to nose: Impaired      Coordination comments:   9 hole peg test:  R= 27 seconds   L= 1 minute and 5 seconds       Cognition:     Orientation: normal to time, normal to place, normal to person and normal to situation    Direction following: two step     Cognition Comments:  Further assessment to be completed      Vision/Perception:     Formal vision perception assessment needed.     Activities of Daily Living:   Transfers/Mobility:     Bed/chair transfers: independent    Sit to stand: supervision    Toilet transfers: supervision    Tub/shower transfers: supervision    Bed mobility: supervision     Toileting:     Toileting: independent    Hygiene: independent    Clothing management: stand by assist    Toileting position: sitting     Bathing:     Bathing: minimum assist    Bathing position: sitting    Washing hair: minimum assist    Washing back: minimum assist    Washing upper body: minimum assist    Washing lower body: minimum assist    Bathing assistive device(s): shower chair     Dressing:     Dressing upper body: minimum assist    Dressing lower body: minimum assist     Grooming:     Brushing teeth or denture care: supervision    Shaving: stand by assist     Feeding:     Feeding: independent     Household Management:     Housekeeping: max A     Laundry: min A seated    Meal preparation: max A     Medication management: min A    Money management: Min  assist    Shopping: unable    Telephone use: supervision    Writing: supervision     ADL Comments:  Impaired standing tolerance during ADLs.  Normally completes most seated at home.  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 Neuromuscular Re-education (CPT 33060)    Therapeutic Treatments and Modalities Summary: Worked on gross and fine motor strengthening and coordination NMRE for left UE.  Anel pegboard,  pegboard solitaire and shuffling cards to enhance fine motor  manipulation of left hand  9# digi-flex = 10 squeezes with 3-5 second hold  11# graded pinch clip for lateral and 3 point pinch = 5 reps for each prehension  Shoulder arc on high position utilizing lateral pinch and controlled shoulder movement  Wrist maze for controlled, isolated wrist/forearm movement  UBE while standing on resistance 2 for 10 minutes, without rest,  to enhance standing tolerance during ADLs.          Time-based treatments/modalities:  Neuromusc re-ed minutes (CPT 74533): 45 minutes        Pain rating before treatment: 0  Pain rating after treatment: 0    ASSESSMENT:   Response to treatment: improved standing tolerance during activity.      PLAN/RECOMMENDATIONS:   Plan for treatment: therapy treatment to continue next visit.  Planned interventions for next visit: continue with current treatment and neuromuscular re-education (CPT 73135)

## 2022-06-01 ENCOUNTER — OCCUPATIONAL THERAPY (OUTPATIENT)
Dept: OCCUPATIONAL THERAPY | Facility: REHABILITATION | Age: 57
End: 2022-06-01
Attending: NURSE PRACTITIONER
Payer: COMMERCIAL

## 2022-06-01 DIAGNOSIS — I69.30 LATE EFFECT OF STROKE: ICD-10-CM

## 2022-06-01 PROCEDURE — 97140 MANUAL THERAPY 1/> REGIONS: CPT

## 2022-06-01 NOTE — OP THERAPY DAILY TREATMENT
Outpatient Occupational Therapy  DAILY TREATMENT     [facility] Occupational Therapy [address].  [address]  [place] [address]  Phone:  [phone]  Fax:  [phone]    Date: 06/01/2022    Patient: Manan Aviles  YOB: 1965  MRN: [id number]     Time Calculation  Start time: 0800  Stop time: 0845 Time Calculation (min): 45 minutes         Chief Complaint: Extremity Weakness    Visit #: 13    SUBJECTIVE:  I am driving independently now and I was on the freeway the other day.  I am doing really well.      OBJECTIVE:  Current objective measures:   Active Range of Motion:   Upper extremity (left):     All left upper extremity active range of motion: All within functional limits        Strength:   Upper extremity strength (left):     Shoulder flexion: 3    Shoulder extension:  4    Shoulder abduction: 3    Shoulder adduction: 4    Shoulder external rotation:  4    Shoulder internal rotation: 3    Elbow flexion: 4    Elbow extension: 4    Forearm pronation: 4    Forearm supination: 4    Wrist flexion: 4-    Wrist extension: 4-    Fingers flexion: 4    Fingers extension: 4    Fingers abduction: 3+    Fingers adduction: 3+     Strength Comments:   strength:  R=84 pounds   L= 68 pounds     Pinch strength:              R         L  Lateral   20    18  3 point   20      17     Tone, Sensation and Coordination:   Tone:     Left upper extremity muscle tone: Normal     Sensation   Upper extremity (left):     Light touch: Intact    Sharp/dull: Intact     Stereognosis: Intact     Proprioception: Intact      Sensation comments:   Patient feels that the temperature in his fluctuates from hot and cold.      Coordination   Upper extremity (left):     Fine motor: Impaired    Gross motor: Impaired    Slow alternating movements: Impaired    Rapid alternating movements: Impaired    Finger to finger: Impaired    Finger touch to nose: Impaired      Coordination comments:   9 hole peg test:  R= 27  seconds   L= 55 seconds      Cognition:     Orientation: normal to time, normal to place, normal to person and normal to situation    Direction following: two step     Cognition Comments:  Further assessment to be completed      Vision/Perception:     Formal vision perception assessment needed.     Activities of Daily Living:   Transfers/Mobility:     Bed/chair transfers: independent    Sit to stand: supervision    Toilet transfers: supervision    Tub/shower transfers: supervision    Bed mobility: supervision     Toileting:     Toileting: independent    Hygiene: independent    Clothing management: stand by assist    Toileting position: sitting     Bathing:     Bathing: minimum assist    Bathing position: sitting    Washing hair: minimum assist    Washing back: minimum assist    Washing upper body: minimum assist    Washing lower body: minimum assist    Bathing assistive device(s): shower chair     Dressing:     Dressing upper body: minimum assist    Dressing lower body: minimum assist     Grooming:     Brushing teeth or denture care: supervision    Shaving: stand by assist     Feeding:     Feeding: independent     Household Management:     Housekeeping: max A     Laundry: min A seated    Meal preparation: max A     Medication management: min A    Money management: Min  assist    Shopping: unable    Telephone use: supervision    Writing: supervision     ADL Comments:  Impaired standing tolerance during ADLs.  Normally completes most seated at home.  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 Neuromuscular Re-education (CPT 49245)    Therapeutic Treatments and Modalities Summary: Worked on gross and fine motor strengthening and coordination NMRE for left UE.  Anel pegboard,  pegboard solitaire and shuffling cards to  enhance fine motor manipulation of left hand  9# digi-flex = 10 squeezes with 3-5 second hold  11# graded pinch clip for lateral and 3 point pinch = 5 reps for each prehension  Shoulder arc on high position utilizing lateral pinch and controlled shoulder movement  Flexion/extension stick in horizontal and vertical postitions=15 x   Wrist maze for controlled, isolated wrist/forearm movement  UBE while standing on resistance 3 for 10 minutes, without rest,  to enhance standing tolerance during ADLs.          Time-based treatments/modalities:  Manual therapy joint mobilization minutes (CPT 82870): 45 minutes        Pain rating before treatment: 0  Pain rating after treatment: 0    ASSESSMENT:   Response to treatment: Improvement noted in strength and dexterity.  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      PLAN/RECOMMENDATIONS:   Plan for treatment: therapy treatment to continue next visit.  Planned interventions for next visit: continue with current treatment and neuromuscular re-education (CPT 40398)

## 2022-06-09 ENCOUNTER — APPOINTMENT (OUTPATIENT)
Dept: OCCUPATIONAL THERAPY | Facility: REHABILITATION | Age: 57
End: 2022-06-09
Attending: NURSE PRACTITIONER
Payer: COMMERCIAL

## 2022-06-14 ENCOUNTER — OCCUPATIONAL THERAPY (OUTPATIENT)
Dept: OCCUPATIONAL THERAPY | Facility: REHABILITATION | Age: 57
End: 2022-06-14
Attending: NURSE PRACTITIONER
Payer: COMMERCIAL

## 2022-06-14 ENCOUNTER — PHYSICAL THERAPY (OUTPATIENT)
Dept: PHYSICAL THERAPY | Facility: REHABILITATION | Age: 57
End: 2022-06-14
Attending: NURSE PRACTITIONER
Payer: COMMERCIAL

## 2022-06-14 DIAGNOSIS — I69.30 LATE EFFECT OF STROKE: ICD-10-CM

## 2022-06-14 PROCEDURE — 97110 THERAPEUTIC EXERCISES: CPT

## 2022-06-14 PROCEDURE — 97112 NEUROMUSCULAR REEDUCATION: CPT

## 2022-06-14 PROCEDURE — 97116 GAIT TRAINING THERAPY: CPT

## 2022-06-14 NOTE — OP THERAPY DAILY TREATMENT
Outpatient Occupational Therapy  DAILY TREATMENT     [facility] Occupational Therapy [address]  [address]  [place] [address]  Phone:  [phone]  Fax:  [phone]    Date: 06/14/2022    Patient: Manan Aviles  YOB: 1965  MRN: [id number]     Time Calculation  Start time: 0845  Stop time: 0930 Time Calculation (min): 45 minutes         Chief Complaint: Extremity Weakness and Self Care Duties    Visit #: 14    SUBJECTIVE:  I am helping more with the cooking..     OBJECTIVE:  Current objective measures:   Active Range of Motion:   Upper extremity (left):     All left upper extremity active range of motion: All within functional limits        Strength:   Upper extremity strength (left):     Shoulder flexion: 4    Shoulder extension:  4    Shoulder abduction: 4    Shoulder adduction: 4    Shoulder external rotation:  4    Shoulder internal rotation: 4    Elbow flexion: 4    Elbow extension: 4    Forearm pronation: 4    Forearm supination: 4    Wrist flexion: 4    Wrist extension: 4    Fingers flexion: 4    Fingers extension: 4    Fingers abduction: 4    Fingers adduction: 4     Strength Comments:   strength:  R=84 pounds   L= 68 pounds     Pinch strength:              R         L  Lateral   20    18  3 point   20      17     Tone, Sensation and Coordination:   Tone:     Left upper extremity muscle tone: Normal     Sensation   Upper extremity (left):     Light touch: Intact    Sharp/dull: Intact     Stereognosis: Intact     Proprioception: Intact      Sensation comments:   Patient feels that the temperature in his fluctuates from hot and cold.      Coordination   Upper extremity (left):     Fine motor: Impaired    Gross motor: Impaired    Slow alternating movements: Impaired    Rapid alternating movements: Impaired    Finger to finger: Impaired    Finger touch to nose: Impaired      Coordination comments:   9 hole peg test:  R= 27 seconds   L= 51 seconds      Cognition:      Orientation: normal to time, normal to place, normal to person and normal to situation    Direction following: two step     Cognition Comments:  Further assessment to be completed      Vision/Perception:     Formal vision perception assessment needed.     Activities of Daily Living:   Transfers/Mobility:     Bed/chair transfers: independent    Sit to stand: supervision    Toilet transfers: supervision    Tub/shower transfers: supervision    Bed mobility: supervision     Toileting:     Toileting: independent    Hygiene: independent    Clothing management: stand by assist    Toileting position: sitting     Bathing:     Bathing: minimum assist    Bathing position: sitting    Washing hair: minimum assist    Washing back: minimum assist    Washing upper body: minimum assist    Washing lower body: minimum assist    Bathing assistive device(s): shower chair     Dressing:     Dressing upper body: minimum assist    Dressing lower body: minimum assist     Grooming:     Brushing teeth or denture care: supervision    Shaving: stand by assist     Feeding:     Feeding: independent     Household Management:     Housekeeping: max A     Laundry: min A seated    Meal preparation: max A     Medication management: min A    Money management: Min  assist    Shopping: unable    Telephone use: supervision    Writing: supervision     ADL Comments:  Impaired standing tolerance during ADLs.  Normally completes most seated at home.  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 Neuromuscular Re-education (CPT 30955)    Therapeutic Treatments and Modalities Summary: Worked on gross and fine motor strengthening and coordination NMRE for left UE.  9# digi-flex = 10 squeezes with 3-5 second hold  11# graded pinch clip for lateral and 3 point pinch = 5  reps for each prehension  Pegboard solitaire utilizing 3 point prehension and smooth controlled shoulder movement without hiking.   Flexion/extension stick in horizontal and vertical postitions=15 x   Wrist maze for controlled, isolated wrist/forearm movement  UBE while standing on resistance 3 for 10 minutes, without rest,  to enhance standing tolerance during ADLs.          Time-based treatments/modalities:  Neuromusc re-ed minutes (CPT 03893): 45 minutes        Pain rating before treatment: 0  Pain rating after treatment: 0    ASSESSMENT:   Response to treatment: improved fine motor manipulation and dexterity of left hand today.   and pinch strength remains status quo.  Improved overall strength of left UE to a 4/5 throughout     PLAN/RECOMMENDATIONS:   Plan for treatment: therapy treatment to continue next visit.  Planned interventions for next visit: continue with current treatment and neuromuscular re-education (CPT 38152)

## 2022-06-14 NOTE — OP THERAPY DAILY TREATMENT
"  Outpatient Physical Therapy  DAILY TREATMENT     [facility] Physical Therapy [place] [address]  [address]  [place] [address]  Phone:  [phone]  Fax:  [phone]    Date: 06/14/2022    Patient: Manan Aviles  YOB: 1965  MRN: [id number]     Time Calculation    Start time: 0930  Stop time: 1015 Time Calculation (min): 45 minutes         Chief Complaint: Difficulty Walking    Visit #: 10    SUBJECTIVE:  Using w/c sometimes in the house.  Walking with shopping cart in store.  Using FWW to walk to bathroom and in home. Doing some walking in home without AD, states he can reach out his hands to wall or furniture as needed.  Pt reports he feels a little dizzy, and has been sleeping a lot lately.    OBJECTIVE:  Current objective measures:   5times STS from standard arm chair= 35 sec, pushing up with BUEs from arms of chair, no AD.  TUG with QC (2 trials) 36 and 37 sec for score of 36.5sec.  PT Functional Assessment Tool Used: 5times STS  PT Functional Assessment Score: 35sec  Timed Up and Go (TUG) Test  Time, in seconds (up from chair, walk 3m and return to chair and sit): 36.5 seconds     Therapeutic Exercises (CPT 75480):     1. Leg press, 5 cords BLEs x20, 2 cords LLE x20    Therapeutic Treatments and Modalities:     Therapeutic Treatment and Modalities Summary: -gait with exaggerated heel toe rocker with focus on knee flexion/hip flexion with QC x40 feet.  -lateral gait with QC with SBA x20 feet B.  -STS from EOM x5.  Emphasis on increased weight acceptance over LLE.  -STS with dynadisc under R foot.  CGA at gait belt and manual facil for increased weight shift over LLE.  Camilla for balance and weight shift.  -STS with dynadisc under R foot and 2\" step under L foot x5 with CGA at gait belt.  Verbal cues and manual facil for increased weight shift over LLE and for anterior weight shift over feet.    Time-based treatments/modalities:    Physical Therapy Timed Treatment Charges  Gait " training minutes (CPT 91282): 35 minutes  Therapeutic exercise minutes (CPT 36549): 10 minutes    ASSESSMENT:   Response to treatment: Pt demo improved STS and weight shift with reps and training.  Pt reports LLE fatigue at end of session.    PLAN/RECOMMENDATIONS:   Plan for treatment: therapy treatment to continue next visit.  Planned interventions for next visit: continue with current treatment.

## 2022-06-14 NOTE — OP THERAPY PROGRESS SUMMARY
Outpatient Physical Therapy  PROGRESS SUMMARY NOTE      [facility] Physical Therapy [place] [address]  [address]  [place] [address]  Phone:  [phone]  Fax:  [phone]    Date of Visit: 06/14/2022    Patient: Manan Aviles  YOB: 1965  MRN: [id number]     Referring Provider: BANDAR Shahid  [address]  [place] [address]   Referring Diagnosis Right sided cerebral hemisphere cerebrovascular accident (CVA) (HCC) [I63.9]     Visit Diagnoses     ICD-10-CM   1. Late effect of stroke  I69.30       Rehab Potential: good    Progress Report Period: 5/20 to 6/14/22    Functional Assessment Used  PT Functional Assessment Tool Used: 5times STS  PT Functional Assessment Score: 35sec  Timed Up and Go (TUG) Test  Time, in seconds (up from chair, walk 3m and return to chair and sit): 36.5 seconds       Objective Findings and Assessment:   Patient progression towards goals: Using w/c sometimes in the house.  Walking with shopping cart in store.  Using FWW to walk to bathroom and in home. Doing some walking in home without AD, states he can reach out his hands to wall or furniture as needed.  Pt reports he feels a little dizzy, and has been sleeping a lot lately.    Objective findings and assessment details: 5times STS from standard arm chair= 35 sec, pushing up with BUEs from arms of chair, no AD.  TUG with QC (2 trials) 36 and 37 sec for score of 36.5sec.  Amb mod I with FWW indoors.  Amb with QC with SPV indoors.    Goals:   Short Term Goals:   1. Pt no longer using w/c for mobility.- NOT MET, CONTINUE.  3. Pt demo more symmetrical gait pattern for safety and minimized fall risk.- PARTIALLY MET, CONTINUE.  4. Pt able to walk 400 feet with QC/SPC Zac in home and in community.- NOT MET, CONTINUE.  Short term goal time span:  2-4 weeks      Long Term Goals:    1. Pt able to perform sit <-> stand from standard chair without UE support.- NOT MET, CONTINUE.  2. Pt able to  walk in home without AD.- PARTIALLY MET, CONTINUE.  3. Pt will demo increased function via improved scores on TUG. - MET, CONTINUE.  Long term goal time span:  6-8 weeks    Plan:   Planned therapy interventions:  Therapeutic Exercise (CPT 92207) and Gait Training (CPT 01244)  Frequency:  2x week  Duration in visits:  12      Referring provider co-signature:  I have reviewed this plan of care and my co-signature certifies the need for services.     Certification Period: 06/14/2022 to 08/09/22    Physician Signature: ________________________________ Date: ______________

## 2022-06-16 ENCOUNTER — APPOINTMENT (OUTPATIENT)
Dept: PHYSICAL THERAPY | Facility: REHABILITATION | Age: 57
End: 2022-06-16
Attending: NURSE PRACTITIONER
Payer: COMMERCIAL

## 2022-06-16 ENCOUNTER — OFFICE VISIT (OUTPATIENT)
Dept: MEDICAL GROUP | Facility: OTHER | Age: 57
End: 2022-06-16
Payer: COMMERCIAL

## 2022-06-16 VITALS
BODY MASS INDEX: 23.03 KG/M2 | TEMPERATURE: 96.7 F | WEIGHT: 170 LBS | DIASTOLIC BLOOD PRESSURE: 60 MMHG | SYSTOLIC BLOOD PRESSURE: 105 MMHG | HEIGHT: 72 IN | OXYGEN SATURATION: 95 % | HEART RATE: 82 BPM | RESPIRATION RATE: 15 BRPM

## 2022-06-16 DIAGNOSIS — E11.69 TYPE 2 DIABETES MELLITUS WITH OTHER SPECIFIED COMPLICATION, WITHOUT LONG-TERM CURRENT USE OF INSULIN (HCC): ICD-10-CM

## 2022-06-16 DIAGNOSIS — I63.9 RIGHT SIDED CEREBRAL HEMISPHERE CEREBROVASCULAR ACCIDENT (CVA) (HCC): ICD-10-CM

## 2022-06-16 DIAGNOSIS — Z79.4 TYPE 2 DIABETES MELLITUS WITH OTHER SPECIFIED COMPLICATION, WITH LONG-TERM CURRENT USE OF INSULIN (HCC): ICD-10-CM

## 2022-06-16 DIAGNOSIS — E78.2 MIXED HYPERLIPIDEMIA: ICD-10-CM

## 2022-06-16 DIAGNOSIS — I25.10 CORONARY ARTERY DISEASE DUE TO LIPID RICH PLAQUE: ICD-10-CM

## 2022-06-16 DIAGNOSIS — E11.69 DYSLIPIDEMIA DUE TO TYPE 2 DIABETES MELLITUS (HCC): ICD-10-CM

## 2022-06-16 DIAGNOSIS — E11.69 TYPE 2 DIABETES MELLITUS WITH OTHER SPECIFIED COMPLICATION, WITH LONG-TERM CURRENT USE OF INSULIN (HCC): ICD-10-CM

## 2022-06-16 DIAGNOSIS — E78.5 DYSLIPIDEMIA DUE TO TYPE 2 DIABETES MELLITUS (HCC): ICD-10-CM

## 2022-06-16 DIAGNOSIS — I25.83 CORONARY ARTERY DISEASE DUE TO LIPID RICH PLAQUE: ICD-10-CM

## 2022-06-16 LAB
HBA1C MFR BLD: 7.7 % (ref 0–5.6)
INT CON NEG: NEGATIVE
INT CON POS: POSITIVE

## 2022-06-16 PROCEDURE — 83036 HEMOGLOBIN GLYCOSYLATED A1C: CPT | Performed by: FAMILY MEDICINE

## 2022-06-16 PROCEDURE — 99214 OFFICE O/P EST MOD 30 MIN: CPT | Performed by: FAMILY MEDICINE

## 2022-06-16 RX ORDER — GLIPIZIDE 5 MG/1
2.5 TABLET ORAL
COMMUNITY
Start: 2022-03-11 | End: 2022-06-16

## 2022-06-16 RX ORDER — METOPROLOL SUCCINATE 100 MG/1
50 TABLET, EXTENDED RELEASE ORAL 2 TIMES DAILY
COMMUNITY
Start: 2022-05-13 | End: 2022-06-16

## 2022-06-16 RX ORDER — METOPROLOL SUCCINATE 100 MG/1
0.5 TABLET, EXTENDED RELEASE ORAL 2 TIMES DAILY
COMMUNITY
Start: 2022-05-13 | End: 2022-06-16

## 2022-06-16 RX ORDER — GABAPENTIN 300 MG/1
1 CAPSULE ORAL EVERY EVENING
COMMUNITY
Start: 2022-03-11 | End: 2022-06-16

## 2022-06-16 RX ORDER — GLIPIZIDE 5 MG/1
2.5 TABLET ORAL
Qty: 180 TABLET | Refills: 3 | Status: SHIPPED | OUTPATIENT
Start: 2022-06-16 | End: 2022-08-01

## 2022-06-16 RX ORDER — ATORVASTATIN CALCIUM 80 MG/1
80 TABLET, FILM COATED ORAL DAILY
Qty: 90 TABLET | Refills: 3 | Status: SHIPPED | OUTPATIENT
Start: 2022-06-16 | End: 2022-09-12 | Stop reason: SDUPTHER

## 2022-06-16 ASSESSMENT — FIBROSIS 4 INDEX: FIB4 SCORE: 1.93

## 2022-06-16 NOTE — PROGRESS NOTES
[facility] MEDICINE     PATIENT ID:  NAME:  Israel Aviles  MRN:               [id number]  YOB: 1965    Date: 9:53 AM      CC:  Follow-up diabetes, stroke, heart disease. cholesterol      HPI: Israel Aviles is a 56 y.o. male who presented with multiple significant comorbidities.  Saw his eye doctor yesterday.     Problem   Right Sided Cerebral Hemisphere Cerebrovascular Accident (Cva) (Hcc)    He was hospitalized for acute stroke on 2/24/22 with left sided deficits.  Was discharged from Rehab unit on 3/12/22.  Being followed by Neurology and will have follow-up on 6/23/22.  He is feeling better and his left arm and hand strength is getting better.  He does feel tired quite easily and that bothers him.  Mood is good.      Type 2 Diabetes Mellitus With Other Specified Complication (Hcc)    Has not been able to get in with Endocrinology to date.  He is interested in subcutaneous glucose monitoring.  Has had difficult to control diabetes for some years.  Has had a prior MI and more recently a stroke in February, 2022.  Uses insulin.  Has a left lower extremity amputation.      Dyslipidemia Due to Type 2 Diabetes Mellitus (Hcc)    He has been on maximal Atorvastatin therapy daily.  Labs of  11/10/21 show LDL 84, HDL 43, Triglycerides 91.  Has known CAD with past MI.  Recent right CVA.            REVIEW OF SYSTEMS:   Ten systems reviewed and were negative except as noted in the HPI.                PROBLEM LIST  Patient Active Problem List   Diagnosis   • Acute ischemic stroke (HCC)   • Uncontrolled type 2 diabetes mellitus with hyperglycemia (HCC)   • Hyperlipidemia   • Wound infection   • PVD (peripheral vascular disease) (HCC)   • Diabetic foot ulcer (HCC)   • Diabetic polyneuropathy associated with type 2 diabetes mellitus (HCC)   • Pressure injury of deep tissue of left foot   • Diabetic ulcer of toe of left foot associated with type 2 diabetes mellitus, with necrosis of muscle (HCC)   •  Osteomyelitis of left foot (HCC)   • Diabetic foot (HCC)   • Vitamin D deficiency   • CAD (coronary artery disease)   • HTN (hypertension)   • Orthostatic hypotension   • Non-healing wound of right heel   • Below-knee amputation (HCC)   • Adjustment disorder with depressed mood   • HFrEF (heart failure with reduced ejection fraction) (HCC)   • Ischemic cardiomyopathy   • Body mass index (BMI) 23.0-23.9, adult   • Type 2 diabetes mellitus with other specified complication ([facility])   • TIA (transient ischemic attack)   • 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)   • Dyslipidemia due to type 2 diabetes mellitus (HCC)   • Colon cancer screening declined   • Neurological deficit present   • Stroke-like symptoms   • Anemia   • Right sided cerebral hemisphere cerebrovascular accident (CVA) ([facility])   • Late effect of stroke   • Drug therapy   • Anxiety        PAST SURGICAL HISTORY:  Past Surgical History:   Procedure Laterality Date   • KNEE AMPUTATION BELOW Left 12/20/2019    Procedure: AMPUTATION, BELOW KNEE;  Surgeon: Koby Zhao M.D.;  Location: SURGERY [facility];  Service: Orthopedics   • [facility] CARDIAC CATH  12/16/2019    multi-vessel disease   • IRRIGATION & DEBRIDEMENT ORTHO Left 6/24/2019    Procedure: IRRIGATION AND DEBRIDEMENT, WOUND-FOOT, BIOLOGIC PLACEMENT, WOUND VAC PLACEMENT;  Surgeon: Charles Chopra M.D.;  Location: SURGERY [facility];  Service: Orthopedics       FAMILY HISTORY:  Family History   Problem Relation Age of Onset   • Diabetes Mother    • Diabetes Father        SOCIAL HISTORY:   Social History     Tobacco Use   • Smoking status: Never Smoker   • Smokeless tobacco: Never Used   Substance Use Topics   • Alcohol use: No       ALLERGIES:  No Known Allergies    OUTPATIENT MEDICATIONS:    Current Outpatient Medications:   •  glipiZIDE (GLUCOTROL) 5 MG Tab, Take 0.5 Tablets by mouth 2 times daily, before breakfast and dinner., Disp: 180 Tablet, Rfl: 3  •  metFORMIN  (GLUCOPHAGE) 500 MG Tab, Take 1 Tablet by mouth 2 times a day with meals., Disp: 180 Tablet, Rfl: 3  •  atorvastatin (LIPITOR) 80 MG tablet, Take 1 Tablet by mouth every day for 90 days., Disp: 90 Tablet, Rfl: 3  •  LUMIGAN 0.01 % Solution, INSTILL 1 DROP INTO EACH EYE AT BEDTIME, Disp: , Rfl:   •  clopidogrel (PLAVIX) 75 MG Tab, Take 1 Tablet by mouth every day., Disp: 30 Tablet, Rfl: 2  •  metoprolol SR (TOPROL XL) 25 MG TABLET SR 24 HR, Take 2 Tablets by mouth 2 times a day. (Patient taking differently: Take 50 mg by mouth 2 times a day. Indications: takes 1/2 in am 1/2 pm), Disp: 120 Tablet, Rfl: 2  •  vitamin D3 2000 UNIT Tab, Take 1 Tablet by mouth every day., Disp: 60 Tablet, Rfl:   •  multivitamin (THERAGRAN) Tab, Take 1 Tablet by mouth every day., Disp: , Rfl:   •  aspirin 81 MG EC tablet, Take 1 tablet by mouth every day., Disp: 30 tablet, Rfl: 2    PHYSICAL EXAM:  Vitals:    06/16/22 0903   BP: 105/60   BP Location: Left arm   Patient Position: Sitting   BP Cuff Size: Adult   Pulse: 82   Resp: 15   Temp: 35.9 °C (96.7 °F)   TempSrc: Temporal   SpO2: 95%   Weight: 77.1 kg (170 lb)   Height: 1.829 m (6')       General: Pt resting in NAD, cooperative   Skin:  Pink, warm and dry.  HEENT: NC/AT. EOMI.  Lungs:  Symmetrical.  CTAB, good air movement   Cardiovascular:  S1/S2 RRR   Extremities:  Full range of motion.  CNS:  Muscle tone is normal. No gross focal neurologic deficits      ASSESSMENT/PLAN:   56 y.o. male     Problem List Items Addressed This Visit     CAD (coronary artery disease)    Relevant Medications    atorvastatin (LIPITOR) 80 MG tablet    Other Relevant Orders    Referral to Pharmacotherapy Service    Dyslipidemia due to type 2 diabetes mellitus (HCC)     Continue 80 mg Atorvastatin daily.   Can check lipids in future.            Relevant Medications    glipiZIDE (GLUCOTROL) 5 MG Tab    metFORMIN (GLUCOPHAGE) 500 MG Tab    Hyperlipidemia    Relevant Medications    atorvastatin (LIPITOR) 80 MG  tablet    Right sided cerebral hemisphere cerebrovascular accident (CVA) (HCC)     Discussed it may take another few months to gain back some of his stamina.   Continue with PT, OT.   Continue Clopidogrel and baby aspirin daily.   Follow-up with Neurology.            Relevant Medications    glipiZIDE (GLUCOTROL) 5 MG Tab    metFORMIN (GLUCOPHAGE) 500 MG Tab    atorvastatin (LIPITOR) 80 MG tablet    Other Relevant Orders    Referral to Pharmacotherapy Service    Type 2 diabetes mellitus with other specified complication (HCC)     A1c 7.7% today in office.   Will seek Pharmacotherapy input for his Diabetes management.              Relevant Medications    glipiZIDE (GLUCOTROL) 5 MG Tab    metFORMIN (GLUCOPHAGE) 500 MG Tab    Other Relevant Orders    POCT Hemoglobin A1C (Completed)    Referral to Pharmacotherapy Service          Donny Mcnally MD  [facility]

## 2022-06-16 NOTE — ASSESSMENT & PLAN NOTE
Discussed it may take another few months to gain back some of his stamina.   Continue with PT, OT.   Continue Clopidogrel and baby aspirin daily.   Follow-up with Neurology.

## 2022-06-17 ENCOUNTER — OCCUPATIONAL THERAPY (OUTPATIENT)
Dept: OCCUPATIONAL THERAPY | Facility: REHABILITATION | Age: 57
End: 2022-06-17
Attending: NURSE PRACTITIONER
Payer: COMMERCIAL

## 2022-06-17 ENCOUNTER — PHYSICAL THERAPY (OUTPATIENT)
Dept: PHYSICAL THERAPY | Facility: REHABILITATION | Age: 57
End: 2022-06-17
Attending: NURSE PRACTITIONER
Payer: COMMERCIAL

## 2022-06-17 ENCOUNTER — TELEPHONE (OUTPATIENT)
Dept: VASCULAR LAB | Facility: MEDICAL CENTER | Age: 57
End: 2022-06-17

## 2022-06-17 DIAGNOSIS — I69.30 LATE EFFECT OF STROKE: ICD-10-CM

## 2022-06-17 PROCEDURE — 97110 THERAPEUTIC EXERCISES: CPT

## 2022-06-17 PROCEDURE — 97116 GAIT TRAINING THERAPY: CPT

## 2022-06-17 PROCEDURE — 97112 NEUROMUSCULAR REEDUCATION: CPT

## 2022-06-17 NOTE — OP THERAPY DAILY TREATMENT
Outpatient Occupational Therapy  DAILY TREATMENT     [facility] Occupational Therapy [address]  [address]  [place] [address]  Phone:  [phone]  Fax:  [phone]    Date: 06/17/2022    Patient: Manan Aviles  YOB: 1965  MRN: [id number]     Time Calculation  Start time: 0930  Stop time: 1015 Time Calculation (min): 45 minutes         Chief Complaint: Extremity Weakness and Self Care Duties    Visit #: 15    SUBJECTIVE:  I am driving independently now and I have such good control with my left arm     OBJECTIVE:  Current objective measures:   Active Range of Motion:   Upper extremity (left):     All left upper extremity active range of motion: All within functional limits        Strength:   Upper extremity strength (left):     Shoulder flexion: 4    Shoulder extension:  4    Shoulder abduction: 4    Shoulder adduction: 4    Shoulder external rotation:  4    Shoulder internal rotation: 4    Elbow flexion: 4    Elbow extension: 4    Forearm pronation: 4    Forearm supination: 4    Wrist flexion: 4    Wrist extension: 4    Fingers flexion: 4    Fingers extension: 4    Fingers abduction: 4    Fingers adduction: 4     Strength Comments:   strength:  R=84 pounds   L= 68 pounds     Pinch strength:              R         L  Lateral   20    18  3 point   20      17     Tone, Sensation and Coordination:   Tone:     Left upper extremity muscle tone: Normal     Sensation   Upper extremity (left):     Light touch: Intact    Sharp/dull: Intact     Stereognosis: Intact     Proprioception: Intact      Sensation comments:   Patient feels that the temperature in his fluctuates from hot and cold.      Coordination   Upper extremity (left):     Fine motor: Impaired    Gross motor: Impaired    Slow alternating movements: Impaired    Rapid alternating movements: Impaired    Finger to finger: Impaired    Finger touch to nose: Impaired      Coordination comments:   9 hole peg test:  R= 27  seconds   L= 51 seconds      Cognition:     Orientation: normal to time, normal to place, normal to person and normal to situation    Direction following: two step     Cognition Comments:  Further assessment to be completed      Vision/Perception:     Formal vision perception assessment needed.     Activities of Daily Living:   Transfers/Mobility:    Mod I      Toileting:     Mod I    Bathing:     Mod I     Dressing:   Mod I   Grooming:   Mod I     Feeding:     Feeding: independent     Household Management:     Housekeeping: min A     Laundry: Mod I folding clothing.    Meal preparation: min A     Medication management: mod I    Money management: Min  assist    Shopping: unable    Telephone use: supervision    Writing: supervision     ADL Comments:  Impaired standing tolerance during ADLs.  Normally completes most seated at home.  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 Neuromuscular Re-education (CPT 78952)    Therapeutic Treatments and Modalities Summary: Worked on gross and fine motor strengthening and coordination NMRE for left UE.  Shoulder arc on high level promoting smooth controlled gross and fine motor movement.   9# digi-flex = 10 squeezes with 3-5 second hold  11# graded pinch clip for lateral and 3 point pinch = 5 reps for each prehension  Velcro roll with cylindrical grasp = 5 reps  Pegboard solitaire utilizing 3 point prehension and smooth controlled shoulder movement without hiking.   Flexion/extension stick in horizontal and vertical postitions=20 x   Wrist maze for controlled, isolated wrist/forearm movement  Medium resistance theraputty and coin manipulation  UBE while standing on resistance 3 for 10 minutes, without rest,  to enhance standing tolerance during ADLs.          Time-based  treatments/modalities:  Neuromusc re-ed minutes (CPT 31338): 45 minutes        Pain rating before treatment: 0  Pain rating after treatment: 0    ASSESSMENT:   Response to treatment: Progressing well and now Mod I with all personal ADLs and light domestic tasks.  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      PLAN/RECOMMENDATIONS:   Plan for treatment: therapy treatment to continue next visit.  Planned interventions for next visit: continue with current treatment and neuromuscular re-education (CPT 88019)

## 2022-06-17 NOTE — OP THERAPY DAILY TREATMENT
Outpatient Physical Therapy  DAILY TREATMENT     [facility] Physical [address]  [address]  [place] [address]  Phone:  [phone]  Fax:  [phone]    Date: 06/17/2022    Patient: Manan Aviles  YOB: 1965  MRN: [id number]     Time Calculation    Start time: 0848  Stop time: 0930 Time Calculation (min): 42 minutes         Chief Complaint: Difficulty Walking    Visit #: 11    SUBJECTIVE:  Pt reports he is walking more.      OBJECTIVE:          Therapeutic Exercises (CPT 35445):     1. Leg press, 6 cords BLEs x20, 2 cords LLE x20    2. Squats at EOM, holding med ball in B hands, x10 with SBA/spv., pt reports L knee pain    3. Standing hip x3, x5B, with BUE support at //bars    Therapeutic Treatments and Modalities:     1. Gait Training (CPT 62345)    Therapeutic Treatment and Modalities Summary: -gait without AD, x200 feet with SBA.  Verbal cues to look ahead, scan occasionally to right and left.  -STS with arms crossed at chest, from EOM, x10.  Cues to keep feet even as pt naturally places R foot posterior to L for increased RLE use.  -lateral gait without AD, x20 feet B with CGA at gait belt.  -stride stance balance with med ball in B hands, arms out stretched, trunk rotation x10B with SBA.  -marching gait in //bars, 2x10 feet forward and backward.  -alt R/L tap to balance pod in //bars, x10.  -forward and backward step to over ankle weights in //bars, x10.  -lateral step to over ankle weights in //bars x10.  -ambulation [id number] without AD, with light CGA at gait belt, holding med ball in R hand and flattened med ball in left hand.  Verbal cues to look ahead, scan occasionally to right and left.      Time-based treatments/modalities:    Physical Therapy Timed Treatment Charges  Gait training minutes (CPT 99979): 30 minutes  Therapeutic exercise minutes (CPT 77740): 12 minutes      ASSESSMENT:   Response to treatment: Pt demo improved functional strength and balance  as evidenced by performing gait activities without AD and with UE and visual tasks.    PLAN/RECOMMENDATIONS:   Plan for treatment: therapy treatment to continue next visit.  Planned interventions for next visit: continue with current treatment.

## 2022-06-17 NOTE — TELEPHONE ENCOUNTER
[facility]    Received pharmacotherapy referral for DM from Dr. Mcnally on 6/16/22    Called pt to schedule appt to establish care - no answer. LVM.    Insurance: Chetna RAY  PCP: [facility]  Locations to be seen: [facility] at [phone], fax [phone]    Nash Colon, DiandraD, BCACP

## 2022-06-21 ENCOUNTER — OCCUPATIONAL THERAPY (OUTPATIENT)
Dept: OCCUPATIONAL THERAPY | Facility: REHABILITATION | Age: 57
End: 2022-06-21
Attending: NURSE PRACTITIONER
Payer: COMMERCIAL

## 2022-06-21 ENCOUNTER — PHYSICAL THERAPY (OUTPATIENT)
Dept: PHYSICAL THERAPY | Facility: REHABILITATION | Age: 57
End: 2022-06-21
Attending: NURSE PRACTITIONER
Payer: COMMERCIAL

## 2022-06-21 DIAGNOSIS — I69.30 LATE EFFECT OF STROKE: ICD-10-CM

## 2022-06-21 PROCEDURE — 97110 THERAPEUTIC EXERCISES: CPT

## 2022-06-21 PROCEDURE — 97112 NEUROMUSCULAR REEDUCATION: CPT

## 2022-06-21 PROCEDURE — 97116 GAIT TRAINING THERAPY: CPT

## 2022-06-21 NOTE — OP THERAPY DAILY TREATMENT
Outpatient Occupational Therapy  DAILY TREATMENT     [facility] Occupational [address].  [address]  [place] [address]  Phone:  [phone]  Fax:  [phone]    Date: 06/21/2022    Patient: Manan Aviles  YOB: 1965  MRN: [id number]     Time Calculation  Start time: 0845  Stop time: 0930 Time Calculation (min): 45 minutes         Chief Complaint: Extremity Weakness and Self Care Duties    Visit #: 16    SUBJECTIVE:  I am having some trouble with my left eye.  It is now blurry and after I take a nap I can't see out of it for a bit.  This happened before and then got better, but now it is back.      OBJECTIVE:  Current objective measures:   Active Range of Motion:   Upper extremity (left):     All left upper extremity active range of motion: All within functional limits     Strength:   Upper extremity strength (left):     Shoulder flexion: 4    Shoulder extension:  4    Shoulder abduction: 4    Shoulder adduction: 4    Shoulder external rotation:  4    Shoulder internal rotation: 4    Elbow flexion: 4    Elbow extension: 4    Forearm pronation: 4    Forearm supination: 4    Wrist flexion: 4    Wrist extension: 4    Fingers flexion: 4    Fingers extension: 4    Fingers abduction: 4    Fingers adduction: 4     Strength Comments:   strength:  R=84 pounds   L= 68 pounds     Pinch strength:              R         L  Lateral   20    18  3 point   20      17     Tone, Sensation and Coordination:   Tone:     Left upper extremity muscle tone: Normal     Sensation   Upper extremity (left):     Light touch: Intact    Sharp/dull: Intact     Stereognosis: Intact     Proprioception: Intact      Sensation comments:   Patient feels that the temperature in his fluctuates from hot and cold.      Coordination   Upper extremity (left):     Fine motor: Impaired    Gross motor: Impaired    Slow alternating movements: Impaired    Rapid alternating movements: Impaired    Finger to finger:  Impaired    Finger touch to nose: Impaired      Coordination comments:   9 hole peg test:  R= 27 seconds   L= 47 seconds      Cognition:     Orientation: normal to time, normal to place, normal to person and normal to situation    Direction following: two step     Cognition Comments:  Further assessment to be completed      Vision/Perception:     Formal vision perception assessment needed.     Activities of Daily Living:   Transfers/Mobility:    Mod I      Toileting:     Mod I     Bathing:     Mod I     Dressing:   Mod I   Grooming:   Mod I     Feeding:     Feeding: independent     Household Management:     Housekeeping: min A     Laundry: Mod I folding clothing.    Meal preparation: min A     Medication management: mod I    Money management: Min  assist    Shopping: unable    Telephone use: supervision    Writing: supervision     ADL Comments:  Impaired standing tolerance during ADLs.  Normally completes most seated at home.  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 Neuromuscular Re-education (CPT 47070)    Therapeutic Treatments and Modalities Summary: Worked on gross and fine motor strengthening and coordination NMRE for left UE.    9# digi-flex = 10 squeezes with 3-5 second hold  11# graded pinch clip for lateral and 3 point pinch = 5 reps for each prehension  Velcro roll with cylindrical grasp = 5 reps  Pegboard solitaire utilizing 3 point prehension and smooth controlled shoulder movement without hiking.   Flexion/extension stick in horizontal and vertical postitions=20 x   Wrist maze for controlled, isolated wrist/forearm movement  Medium resistance theraputty and coin manipulation  UBE while standing on resistance 3 for 10 minutes, without rest,  to enhance standing tolerance during ADLs.          Time-based  treatments/modalities:  Neuromusc re-ed minutes (CPT 90901): 45 minutes        Pain rating before treatment: 0  Pain rating after treatment: 0    ASSESSMENT:   Response to treatment: improved fine motor manipulation and dexterity with left hand.  Progress note completed and requesting additional visits.      PLAN/RECOMMENDATIONS:   Plan for treatment: therapy treatment to continue next visit.  Planned interventions for next visit: continue with current treatment and neuromuscular re-education (CPT 70421)

## 2022-06-21 NOTE — OP THERAPY PROGRESS SUMMARY
Outpatient Occupational Therapy  PROGRESS SUMMARY NOTE    [facility] Occupational Therapy [address]  [address]  [place] [address]  Phone:  [phone]  Fax:  [phone]    Date of Visit: 06/21/2022    Patient: Manan Aviles  YOB: 1965  MRN: [id number]     Referring Provider: BANDAR Shahid  [address] [address]  [place] [address]   Referring Diagnosis Unspecified sequelae of cerebral infarction [I69.30]     Visit Diagnoses     ICD-10-CM   1. Late effect of stroke  I69.30       Rehab Potential: excellent    Progress Report Period: 5/12/22 - 6/21/22    Functional Assessment Used:  UEFI = 52/80          Objective Findings and Assessment:   Patient progression towards goals: Patient continues to progress with OT intervention.  Patient now mod I with all personal ADLs using DME and energy conservation techniques.  He is now min A with light domestic tasks and light meal prep.  His standing tolerance during a functional activity is now at 10 minutes.  Patients /pinch strength and manipulation/dexterity of left hand has improved as well. Patient now back to independent driving.  Patient would benefit with continued OT intervention to further enhance standing tolerance during functional tasks and improve overall function of left UE.  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    Objective findings and assessment details: Current objective measures:   Active Range of Motion:   Upper extremity (left):     All left upper extremity active range of motion: All within functional limits     Strength:   Upper extremity strength (left):     Shoulder flexion: 4    Shoulder extension:  4    Shoulder abduction: 4    Shoulder adduction: 4    Shoulder external rotation:  4    Shoulder internal rotation: 4    Elbow flexion: 4    Elbow extension: 4    Forearm pronation: 4    Forearm supination: 4    Wrist flexion: 4    Wrist extension:  4    Fingers flexion: 4    Fingers extension: 4    Fingers abduction: 4    Fingers adduction: 4     Strength Comments:   strength:  R=84 pounds   L= 68 pounds     Pinch strength:              R         L  Lateral   20    18  3 point   20      17     Tone, Sensation and Coordination:   Tone:     Left upper extremity muscle tone: Normal     Sensation   Upper extremity (left):     Light touch: Intact    Sharp/dull: Intact     Stereognosis: Intact     Proprioception: Intact      Sensation comments:   Patient feels that the temperature in his fluctuates from hot and cold.      Coordination   Upper extremity (left):     Fine motor: Impaired    Gross motor: Impaired    Slow alternating movements: Impaired    Rapid alternating movements: Impaired    Finger to finger: Impaired    Finger touch to nose: Impaired      Coordination comments:   9 hole peg test:  R= 27 seconds   L= 47 seconds      Cognition:     Orientation: normal to time, normal to place, normal to person and normal to situation    Direction following: two step     Cognition Comments:  Further assessment to be completed      Vision/Perception:     Formal vision perception assessment needed.     Activities of Daily Living:   Transfers/Mobility:    Mod I      Toileting:     Mod I     Bathing:     Mod I     Dressing:   Mod I   Grooming:   Mod I     Feeding:     Feeding: independent     Household Management:     Housekeeping: min A     Laundry: Mod I folding clothing.    Meal preparation: min A     Medication management: mod I    Money management: Min  assist    Shopping: unable    Telephone use: supervision    Writing: supervision     ADL Comments:  Impaired standing tolerance during ADLs.  Normally completes most seated at home.  Patient stated he is now unable to type and wants to help out more with the business     Impaired standing balance of fair and impaired standing endurance of only 10  minutes during task    Goals:   Short Term Goals:   Patient will  gain overall 4+/5 left UE strength  Patient will be able to engage in activity for 12 minutes while standing  Patient will increase left  strength to at least 72 pounds to enhance domestic and work tasks.  Patient will be able to type at least 10 words per minute    Short term goal timespan:  2-4 weeks    Long Term Goals:     Patient will be Set up/supervision for light meal prep while standing at kitchen counter  Patient will be able to engage in activity for at least 15 minutes while standing  Patient will increase left  strength to at least 75 pounds to enhance domestic and work tasks.    Patient will be able to type at least 15 words per minute  Patient will score at least 60/80 on the UEFI  Long term goal timespan:  4-6 weeks    Plan:   Planned therapy interventions:  Neuromuscular Re-education (CPT 66249)  Other planned therapy interventions:  97112 x 3   Frequency:  2x week  Duration in weeks:  6  Duration in visits:  12      Referring provider co-signature:  I have reviewed this plan of care and my co-signature certifies the need for services.    Certification Period: 06/21/2022 to 08/02/22    Physician Signature: ________________________________ Date: ______________

## 2022-06-21 NOTE — OP THERAPY DAILY TREATMENT
"  Outpatient Physical Therapy  DAILY TREATMENT     [facility] Physical [address].  [address]  [place] [address]  Phone:  [phone]  Fax:  [phone]    Date: 06/21/2022    Patient: Manan Aviles  YOB: 1965  MRN: [id number]     Time Calculation    Start time: 0930  Stop time: 1010 Time Calculation (min): 40 minutes         Chief Complaint: Difficulty Walking    Visit #: 12    SUBJECTIVE:  Pt reports he feels tired today and woke from a nap yesterday with blurry L side vision.  Wife is making a vision appointment.    OBJECTIVE:          Therapeutic Exercises (CPT 52777):     1. Leg press, 5 cords BLEs x20, 2 cords LLE x20    3. Standing hip x3, x10B with BUE support at rail    4. 2\" step up, x10B with BUE support    Therapeutic Treatments and Modalities:     1. Gait Training (CPT 40949)    Therapeutic Treatment and Modalities Summary: -gait without AD, 2x100 feet with CGA at gait belt.  Camilla required x3 due to LOB.  Pt reports LOB due to BLE weakness and L eye vision disturbance.  Verbal cues to look ahead and to occasionally scan environment.  -STS with arms crossed at chest, from EOM, x10.  Cues to keep feet even as pt naturally places R foot posterior to L for increased RLE use.  -AP weight shift in stride stance, x10B.  Manual and visual cues for increased forward weight shift over LLE, with R heel off.  -alt heel tap to ankle weight with BUE support at rail x10  -lateral step to over ankle weights x10B with BUE support at rail    Time-based treatments/modalities:    Physical Therapy Timed Treatment Charges  Gait training minutes (CPT 02258): 30 minutes  Therapeutic exercise minutes (CPT 96411): 10 minutes      ASSESSMENT:   Response to treatment: Pt demo decreased standing ashutosh and balance today.  Pt reports feeling weak and required more rest breaks today.    PLAN/RECOMMENDATIONS:   Plan for treatment: therapy treatment to continue next visit.  Planned " interventions for next visit: continue with current treatment.

## 2022-06-22 ENCOUNTER — OFFICE VISIT (OUTPATIENT)
Dept: NEUROLOGY | Facility: MEDICAL CENTER | Age: 57
End: 2022-06-22
Attending: NURSE PRACTITIONER
Payer: COMMERCIAL

## 2022-06-22 VITALS
OXYGEN SATURATION: 100 % | SYSTOLIC BLOOD PRESSURE: 110 MMHG | RESPIRATION RATE: 12 BRPM | HEART RATE: 88 BPM | TEMPERATURE: 97.6 F | BODY MASS INDEX: 23.17 KG/M2 | WEIGHT: 171.08 LBS | DIASTOLIC BLOOD PRESSURE: 70 MMHG | HEIGHT: 72 IN

## 2022-06-22 DIAGNOSIS — E11.42 DIABETIC POLYNEUROPATHY ASSOCIATED WITH TYPE 2 DIABETES MELLITUS (HCC): ICD-10-CM

## 2022-06-22 DIAGNOSIS — E78.2 MIXED HYPERLIPIDEMIA: ICD-10-CM

## 2022-06-22 DIAGNOSIS — E11.69 TYPE 2 DIABETES MELLITUS WITH OTHER SPECIFIED COMPLICATION, WITHOUT LONG-TERM CURRENT USE OF INSULIN (HCC): ICD-10-CM

## 2022-06-22 DIAGNOSIS — I69.30 LATE EFFECT OF STROKE: ICD-10-CM

## 2022-06-22 PROBLEM — E11.9 DIABETES MELLITUS (HCC): Status: ACTIVE | Noted: 2022-06-22

## 2022-06-22 PROCEDURE — 99212 OFFICE O/P EST SF 10 MIN: CPT | Performed by: NURSE PRACTITIONER

## 2022-06-22 PROCEDURE — 99214 OFFICE O/P EST MOD 30 MIN: CPT | Performed by: NURSE PRACTITIONER

## 2022-06-22 RX ORDER — GABAPENTIN 100 MG/1
100 CAPSULE ORAL
Qty: 30 CAPSULE | Refills: 11 | Status: SHIPPED | OUTPATIENT
Start: 2022-06-22 | End: 2023-06-19

## 2022-06-22 ASSESSMENT — ENCOUNTER SYMPTOMS
FALLS: 0
HEADACHES: 0
FOCAL WEAKNESS: 1
BLURRED VISION: 0
DIZZINESS: 0
DEPRESSION: 0
TINGLING: 0
DOUBLE VISION: 0
NERVOUS/ANXIOUS: 1
SENSORY CHANGE: 0
BLOOD IN STOOL: 0
BRUISES/BLEEDS EASILY: 0
COUGH: 0

## 2022-06-22 ASSESSMENT — FIBROSIS 4 INDEX: FIB4 SCORE: 1.93

## 2022-06-22 NOTE — PATIENT INSTRUCTIONS
If any new signs of stroke:  sudden weakness, numbness, speech difficulty (slurring or difficulty finding words), imbalance, incoordination, worse headache of life or vision loss occur, call 911.      Take all medications as prescribed unless instructed by your provider.    Exercise at least 30 minutes daily, avoid red meat, fried foods, butter, cheese.   Eat 5-6 servings of vegetables and fruits daily, choose lean white meat without skin (chicken, turkey, white fish)--baked, broiled or grilled.    A1C goal less than 7.0    Blood pressure goal less than 130/80    Bad cholesterol goal (LDL) less than 70 , continue Atorvastatin 80mg for life, this helps prevent stroke not only by keeping the cholesterol down, but by smoothing out any existing plaque in the arteries and preventing new plaque from sticking to arteries.    Continue Aspirin 81mg and Plavix 75mg per cardiology    Your primary care will need to refill the Gabapentin 100mg, I sent a prescription for 1 year today, you can take one at night as needed for nerve pain or sleep.

## 2022-06-22 NOTE — PROGRESS NOTES
Recommended OBSERVATION and MONITORING for change. Subjective   HPI     Manan Aviles is a 56  y.o. right handed male who presents to [facility] for follow up for infarct of right posterior limb of internal capsule, with multiple remote lacunes.    I last saw him on 3/16/2022      He presented to [facility] on 2/17/2022 with complaints of  LUE/LLE weakness. NIHSS 2. He had been on DAPT with ASA and Plavix for CAD and reports compliance  I last saw him in 7/19/2021 for possible TIA.      PMH includes CAD, DMII, cardiomyopathy, PAD, HLD, diabetic polyneuropathy, left BKA, depression, stroke in 2018.   Social History:  Denies history of tobacco, alcohol or drug use., he is not working now, he used to work at a gas station.        He is here with wife today.  He ambulates with walker, no falls.  He is getting therapy at [facility] outpatient.  He is complaining of floaters in left eye.  He still has some difficulty sleeping and sometimes notices neuropathic pain in his feet, he occasionally takes 300mg of Gabapentin at night; however, it makes him too tired in the morning.     He dresses and showers on his own.  He is not cooking.      Review of Systems   HENT: Negative for nosebleeds.    Eyes: Negative for blurred vision and double vision.        Floaters   Respiratory: Negative for cough.    Cardiovascular: Negative for chest pain.   Gastrointestinal: Negative for blood in stool.   Genitourinary: Negative for hematuria.   Musculoskeletal: Negative for falls.   Neurological: Positive for focal weakness. Negative for dizziness, tingling, sensory change and headaches.   Endo/Heme/Allergies: Does not bruise/bleed easily.   Psychiatric/Behavioral: Negative for depression. The patient is nervous/anxious.        Objective      I personally reviewed imaging below and agree with the findings  MRI brain 2/18/2022  Acute infarct in the right posterior limb internal capsule and adjacent basal ganglia.     Multiple remote lacunar infarcts  involving the bilateral cerebral hemispheric deep white matter. These are more numerous than on the previous study from 2018.     Interval progression of chronic deep white matter microvascular ischemic changes.     Gradient echo hypointense lesions along the medial right temporal cortex involving the hippocampus and the medial right parietal region may represent focal microhemorrhages related to hypertension.     CTA head 2/17/2022  No thrombosis is seen within the [place] [facility] [place].     CTA neck 2/17/2022  Atherosclerosis of the bilateral carotid bifurcations/bulbs, left greater than right, with less than 50% stenosis.     Patent vertebral arteries.     TTE 2/18/2022:  LVEF 60%, bubble study incomplete, LA size WNL, BRYON 12.      Labs:  Creat 0.74, LDL 65, A1C 8.2,    Encounter Vitals  Standard Vitals  Vitals  Blood Pressure: 110/70  Temperature: 36.4 °C (97.6 °F)  Temp src: Temporal  Pulse: 88  Respiration: 12  Pulse Oximetry: 100 %  Height: 182.9 cm (6')  Weight: 77.6 kg (171 lb 1.2 oz)  Encounter Vitals  Temperature: 36.4 °C (97.6 °F)  Temp src: Temporal  Blood Pressure: 110/70  Pulse: 88  Respiration: 12  Pulse Oximetry: 100 %  Weight: 77.6 kg (171 lb 1.2 oz)  Height: 182.9 cm (6')  BMI (Calculated): 23.2        PHYSICAL ASSESSMENT  Constitutional:  Alert, no apparent distress,  Psych:   mood and affect WNL  Muskuloskeletal:  Moves all extremities equally, strength 5/5  BUE/BLE flexors/extensors, no drift  NEUROLOGICAL ASSESSMENT  Oriented X  Date, disoriented to age, speech fluent, naming and memory intact  CN II: Visual fields are full to confrontation. Fundoscopic exam is normal with sharp discs and no vascular changes. Pupils are 4 mm and briskly reactive to light.   CN III: IV, VI  EOMs intact, no ptosis  CN V: Facial sensation is intact to pinprick in all 3 divisions bilaterally. Corneal responses are intact.  CN VII: Face is symmetric with normal eye closure and smile.  CN VIII Hearing is normal to  rubbing fingers  CN IX, X: Palate elevates symmetrically. Phonation is normal.  CN XI: Head turning and shoulder shrug are intact  CN XII: Tongue is midline with normal movements and no atrophy.                           Sensation to PP equal bilaterally                 No limb ataxia with finger to nose and heel to shin                 Ambulates with walker                    Cardiovascular:    S1S2, no abnormal rhythm auscultated, no peripheral edema  Neck:                     No carotid bruits noted   Pulmonary:            Respirations easy, lungs clear to auscultation all fields.     Skin:                     No obvious rashes.    Iniital NIHSS  2      Current NIHSS    1a. LOC: 0  1b. LOC Questions: 1  1c. LOC Commands: 0  2. Best Gaze:0  3. Visual Fields: 0  4. Facial Paresis: 0  5a. Motor arm left: 0  5b. Motor arm right: 0  6a. Motor leg left: 0  6b. Motor leg right: 0  7. Sensory: 0  8. Best Language: 0  9. Limb Ataxia: 0  10. Dysarthria: 0  11. Extinction/Inattention: 0    Total Score Current  1          Current mRS 3    Assessment & Plan     1. Late effect of stroke  Small vessel infarct of right internal capsule secondary to diabetes, HLD.  HE had been on DAPT and was compliant, however, his diabetes was not well controlled which was the likely cause of small vessel stroke      Presumed Mechanism by Toast:  __  Large Artery Atherosclerosis  XX__  Small Vessel (lacunar)  __   Cardioembolic  __   Other (Sickle cell, vasculitis, hypercoagulable)  __   Unknown  __   ESUS     Stroke risk factors include HLD, DM     Blood pressure goal less than 130/80, currently 108/64          Continue Aspirin 81mg PO daily and Plavix 75mg per cardiology. discussed side effects, signs of bleeding    2. Type 2 diabetes mellitus with other specified complication, without long-term current use of insulin (HCC)  Goal A1C less than 7.0, current 7.7 (was 8.2 at the the time of the stroke)     3. Mixed hyperlipidemia  LDL goal < 70,  current 65, continue Atorvastatin 80mg, discussed medication side effects, will need follow up with primary care evaluate liver function and intervals and refill  Exercise at least 30 minutes daily, avoid red meat, fried foods, butter, cheese.   Eat 5-6 servings of vegetables and fruits daily, choose lean white meat without skin (chicken, turkey, white fish)--baked, broiled or grilled       4. Diabetic polyneuropathy associated with type 2 diabetes mellitus (HCC)  May take 100mg Gabapentin QHS PRN for pain or sleep, he is not tolerating the 300mg.  New Rx sent .       f any new signs of stroke:  sudden weakness, numbness, speech difficulty (slurring or difficulty finding words), imbalance, incoordination, worse headache of life or vision loss occur, call 911.       Take all medications as prescribed unless instructed by your provider.        I spent a total of 40 minutes caring for patient,  my time includes counseling, review of systems, HPI and assessment, review of images, labs and testing as above.  I reviewed the hospital records, PMH, social and family history.   I have counseled patient on stroke prevention strategies, stroke symptoms and mimics.  Diet and exercise modifications.  We discussed medication side effects and instructions.                Follow up PRN

## 2022-06-23 ENCOUNTER — OCCUPATIONAL THERAPY (OUTPATIENT)
Dept: OCCUPATIONAL THERAPY | Facility: REHABILITATION | Age: 57
End: 2022-06-23
Attending: NURSE PRACTITIONER
Payer: COMMERCIAL

## 2022-06-23 ENCOUNTER — PHYSICAL THERAPY (OUTPATIENT)
Dept: PHYSICAL THERAPY | Facility: REHABILITATION | Age: 57
End: 2022-06-23
Attending: NURSE PRACTITIONER
Payer: COMMERCIAL

## 2022-06-23 DIAGNOSIS — I69.30 LATE EFFECT OF STROKE: ICD-10-CM

## 2022-06-23 PROCEDURE — 97116 GAIT TRAINING THERAPY: CPT

## 2022-06-23 PROCEDURE — 97112 NEUROMUSCULAR REEDUCATION: CPT

## 2022-06-23 PROCEDURE — 97110 THERAPEUTIC EXERCISES: CPT

## 2022-06-23 NOTE — OP THERAPY DAILY TREATMENT
"  Outpatient Physical Therapy  DAILY TREATMENT     [facility] Physical Therapy [address]  [address]  [place] [address]  Phone:  [phone]  Fax:  [phone]    Date: 06/23/2022    Patient: Manan Aviles  YOB: 1965  MRN: [id number]     Time Calculation    Start time: 0930  Stop time: 1015 Time Calculation (min): 45 minutes         Chief Complaint: Difficulty Walking    Visit #: 13    SUBJECTIVE:  Pt reports his vision is improving.    OBJECTIVE:        Therapeutic Exercises (CPT 34553):     1. Leg press, 5 cords BLEs x20, 2 cords LLE x20    2. Tap to flat top 1/2 FR, x10b forward, x10B lateral, with BUE support at rail    Therapeutic Treatments and Modalities:     1. Gait Training (CPT 18746)    Therapeutic Treatment and Modalities Summary: -gait without AD, x100 feet with CGA at gait belt.  Verbal cues to look ahead and to occasionally scan environment.  -gait without AD, CGA with gait belt, picking up cones at chest height, waist height, and floor.  Slow and cautious picking up from floor.  -Standing stacking cones towards R/L floor to/from 14\"step with CGA at gait belt.  -gait holding ball in both hands x100 feet with CGA at gait belt.  -step through 2x10B at tape on floor.  -stride stance with ball toss in both hands, x20B with CGA/Hari at gait belt.      Time-based treatments/modalities:    Physical Therapy Timed Treatment Charges  Gait training minutes (CPT 72378): 35 minutes  Therapeutic exercise minutes (CPT 27510): 10 minutes      ASSESSMENT:   Response to treatment: Pt presents with improving functional strength and dynamic balance as evidenced by takin munira challenges to gait and balance with UE activity and visual tasks.    PLAN/RECOMMENDATIONS:   Plan for treatment: therapy treatment to continue next visit.  Planned interventions for next visit: continue with current treatment.       "

## 2022-06-23 NOTE — OP THERAPY DAILY TREATMENT
Outpatient Occupational Therapy  DAILY TREATMENT     [facility] Occupational Therapy [address].  [address]  [place] [address]  Phone:  [phone]  Fax:  [phone]    Date: 06/23/2022    Patient: Manan Aviles  YOB: 1965  MRN: [id number]     Time Calculation  Start time: 0845  Stop time: 0930 Time Calculation (min): 45 minutes         Chief Complaint: Extremity Weakness and Self Care Duties    Visit #: 17    SUBJECTIVE:  I saw the doctor about my eye and they said there was no blood and it should get better.     OBJECTIVE:  Current objective measures:     All left upper extremity active range of motion: All within functional limits     Strength:   Upper extremity strength (left):     Shoulder flexion: 4    Shoulder extension:  4    Shoulder abduction: 4    Shoulder adduction: 4    Shoulder external rotation:  4    Shoulder internal rotation: 4    Elbow flexion: 4    Elbow extension: 4    Forearm pronation: 4    Forearm supination: 4    Wrist flexion: 4    Wrist extension: 4    Fingers flexion: 4    Fingers extension: 4    Fingers abduction: 4    Fingers adduction: 4     Strength Comments:   strength:  R=84 pounds   L= 68 pounds     Pinch strength:              R         L  Lateral   20    18  3 point   20      17     Tone, Sensation and Coordination:   Tone:     Left upper extremity muscle tone: Normal     Sensation   Upper extremity (left):     Light touch: Intact    Sharp/dull: Intact     Stereognosis: Intact     Proprioception: Intact      Sensation comments:   Patient feels that the temperature in his fluctuates from hot and cold.      Coordination   Upper extremity (left):     Fine motor: Impaired    Gross motor: Impaired    Slow alternating movements: Impaired    Rapid alternating movements: Impaired    Finger to finger: Impaired    Finger touch to nose: Impaired      Coordination comments:   9 hole peg test:  R= 27 seconds   L= 47 seconds      Cognition:      Orientation: normal to time, normal to place, normal to person and normal to situation    Direction following: two step     Cognition Comments:  Further assessment to be completed      Vision/Perception:     Formal vision perception assessment needed.     Activities of Daily Living:   Transfers/Mobility:    Mod I      Toileting:     Mod I     Bathing:     Mod I     Dressing:   Mod I   Grooming:   Mod I     Feeding:     Feeding: independent     Household Management:     Housekeeping: min A     Laundry: Mod I folding clothing.    Meal preparation: min A     Medication management: mod I    Money management: Min  assist    Shopping: unable    Telephone use: supervision    Writing: supervision     ADL Comments:  Impaired standing tolerance during ADLs.  Normally completes most seated at home.  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 Neuromuscular Re-education (CPT 53732)    Therapeutic Treatments and Modalities Summary: Worked on gross and fine motor strengthening and coordination NMRE for left UE.  Fine motor manipulation using grooved pegboard and use to screwdriver and tweezers with left hand.    9# digi-flex = 10 squeezes with 3-5 second hold  11# graded pinch clip for lateral and 3 point pinch = 5 reps for each prehension  Flexion/extension stick in horizontal and vertical postitions=20 x   UBE while standing on resistance 3 for 10 minutes, without rest,  to enhance standing tolerance during ADLs.          Time-based treatments/modalities:  Neuromusc re-ed minutes (CPT 91941): 45 minutes        Pain rating before treatment: 0  Pain rating after treatment: 0    ASSESSMENT:   Response to treatment: Continues to progress with therapy.  Awaiting authorization for additional visits    PLAN/RECOMMENDATIONS:   Plan for  treatment: therapy treatment to continue next visit.  Planned interventions for next visit: continue with current treatment and neuromuscular re-education (CPT 52646)

## 2022-06-24 ENCOUNTER — TELEPHONE (OUTPATIENT)
Dept: CARDIOLOGY | Facility: MEDICAL CENTER | Age: 57
End: 2022-06-24
Payer: COMMERCIAL

## 2022-06-24 ENCOUNTER — APPOINTMENT (OUTPATIENT)
Dept: OCCUPATIONAL THERAPY | Facility: REHABILITATION | Age: 57
End: 2022-06-24
Attending: NURSE PRACTITIONER
Payer: COMMERCIAL

## 2022-06-24 NOTE — TELEPHONE ENCOUNTER
Received fax from   [facility]    FAX:  [phone]  TELE:  [phone]      Requesting cardiac clearance for upcoming    EXTENSIVE ORAL SURGERY. WILL INVOLVE INTERACTION WITH BLOOD STREAM WITH LOCAL ANESTHETIC WITH VASOCONSTRICTION AND/OR IV ANESTHETIC         Scheduled on:  NONE LISTED      Medication hold  PLAVIX____ ASA______  days prior to procedure.      To VR

## 2022-06-27 ENCOUNTER — PHYSICAL THERAPY (OUTPATIENT)
Dept: PHYSICAL THERAPY | Facility: REHABILITATION | Age: 57
End: 2022-06-27
Attending: NURSE PRACTITIONER
Payer: COMMERCIAL

## 2022-06-27 ENCOUNTER — APPOINTMENT (OUTPATIENT)
Dept: OCCUPATIONAL THERAPY | Facility: REHABILITATION | Age: 57
End: 2022-06-27
Attending: NURSE PRACTITIONER
Payer: COMMERCIAL

## 2022-06-27 DIAGNOSIS — I69.30 LATE EFFECT OF STROKE: ICD-10-CM

## 2022-06-27 PROCEDURE — 97110 THERAPEUTIC EXERCISES: CPT

## 2022-06-27 PROCEDURE — 97116 GAIT TRAINING THERAPY: CPT

## 2022-06-27 NOTE — OP THERAPY DAILY TREATMENT
Outpatient Physical Therapy  DAILY TREATMENT     [facility] Physical [address].  [address]  [place] [address]  Phone:  [phone]  Fax:  [phone]    Date: 06/27/2022    Patient: Manan Aviles  YOB: 1965  MRN: [id number]     Time Calculation    Start time: 0800  Stop time: 0843 Time Calculation (min): 43 minutes         Chief Complaint: Difficulty Walking, Loss Of Balance, and Weakness    Visit #: 14    SUBJECTIVE:  States working on walking without Ad at home for short distance. Goes for walks outside with FWW and around [employer] with use of cart. Vision is improving.     OBJECTIVE:        Therapeutic Exercises (CPT 79387):     1. Leg press, 5 cords BLEs Red attachemnt 2 x20, 2 cords LLE x20 red attachement, red attachement for visual of unequal weightbearing    20. 6/14-8/9      Therapeutic Exercise Summary: 2 therEx, 2 gait  Auth approved   Auth ID: [id number]  06/24/2022-09/21/2022  12 visits     Therapeutic Treatments and Modalities:     1. Gait Training (CPT 04967)    Therapeutic Treatment and Modalities Summary: -gait without AD, x 262 feet. Min head turns unless cued. After 1 cue pt had mod Lob requiring min and to correct with a sustained head turn to the left. Mod seated rest required due to fatigue    Fwd/bwd stepping with sustained head turns x 10 each LE with sustained head turns in each direction. CGA throughout. 3 instances mod A, 2 instance min A for LOB with left LE positioned anteriorly. Sig fatigue with extended rest required.     Modified split stance with head turns, EC. X 90 sec each. Increased need for min touch A with left head turns in bilateral stance. 3 instances stepping reaction for LOB    Lateral side stepping, use of tape on ground for visual x 3 min, CGA, cues to dec visual input, cues for larger step to dec significant narrow GILLIAN with come carry over. 2 instances min a For LOB when stepping to left.    Time-based  treatments/modalities:    Physical Therapy Timed Treatment Charges  Gait training minutes (CPT 35448): 33 minutes  Therapeutic exercise minutes (CPT 12303): 10 minutes      ASSESSMENT:   Response to treatment: Pt continues to have dynamic balance and gait deficits most notably with head turns and multi tasking when ambulating without AD. Will continue to progress dynamic balance and gait as able to improve function.    PLAN/RECOMMENDATIONS:   Plan for treatment: therapy treatment to continue next visit.  Planned interventions for next visit: continue with current treatment.

## 2022-06-28 ENCOUNTER — OCCUPATIONAL THERAPY (OUTPATIENT)
Dept: OCCUPATIONAL THERAPY | Facility: REHABILITATION | Age: 57
End: 2022-06-28
Attending: NURSE PRACTITIONER
Payer: COMMERCIAL

## 2022-06-28 DIAGNOSIS — I69.30 LATE EFFECT OF STROKE: ICD-10-CM

## 2022-06-28 PROCEDURE — 97112 NEUROMUSCULAR REEDUCATION: CPT

## 2022-06-28 NOTE — OP THERAPY DAILY TREATMENT
Outpatient Occupational Therapy  DAILY TREATMENT     [facility] Occupational Therapy [address].  [address]  [place] [address]  Phone:  [phone]  Fax:  [phone]    Date: 06/28/2022    Patient: Manan Aviles  YOB: 1965  MRN: [id number]     Time Calculation  Start time: 0800  Stop time: 0845 Time Calculation (min): 45 minutes         Chief Complaint: Extremity Weakness and Self Care Duties    Visit #: 18    SUBJECTIVE:  I am helping a bit more with cutting food to help Augustina with the cooking    OBJECTIVE:  Current objective measures:     All left upper extremity active range of motion: All within functional limits     Strength:   Upper extremity strength (left):     Shoulder flexion: 4    Shoulder extension:  4    Shoulder abduction: 4    Shoulder adduction: 4    Shoulder external rotation:  4    Shoulder internal rotation: 4    Elbow flexion: 4    Elbow extension: 4    Forearm pronation: 4    Forearm supination: 4    Wrist flexion: 4    Wrist extension: 4    Fingers flexion: 4    Fingers extension: 4    Fingers abduction: 4    Fingers adduction: 4     Strength Comments:   strength:  R=84 pounds   L= 68 pounds     Pinch strength:              R         L  Lateral   20    18  3 point   20      17     Tone, Sensation and Coordination:   Tone:     Left upper extremity muscle tone: Normal     Sensation   Upper extremity (left):     Light touch: Intact    Sharp/dull: Intact     Stereognosis: Intact     Proprioception: Intact      Sensation comments:   Patient feels that the temperature in his fluctuates from hot and cold.      Coordination   Upper extremity (left):     Fine motor: Impaired    Gross motor: Impaired    Slow alternating movements: Impaired    Rapid alternating movements: Impaired    Finger to finger: Impaired    Finger touch to nose: Impaired      Coordination comments:   9 hole peg test:  R= 27 seconds   L= 47 seconds      Cognition:     Orientation: normal to  time, normal to place, normal to person and normal to situation    Direction following: two step     Cognition Comments:  Further assessment to be completed      Vision/Perception:     Formal vision perception assessment needed.     Activities of Daily Living:   Transfers/Mobility:    Mod I      Toileting:     Mod I     Bathing:     Mod I     Dressing:   Mod I   Grooming:   Mod I     Feeding:     Feeding: independent     Household Management:     Housekeeping: min A     Laundry: Mod I folding clothing.    Meal preparation: min A     Medication management: mod I    Money management: Min  assist    Shopping: unable    Telephone use: supervision    Writing: supervision     ADL Comments:  Impaired standing tolerance during ADLs.  Normally completes most seated at home.  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 Neuromuscular Re-education (CPT 34025)    Therapeutic Treatments and Modalities Summary: Focus today on dynamic standing balance and standing endurance during functional tasks and left fine motor manipulation and dexterity  UBE while standing on resistance 4 for 10 minutes, without rest  Balloon toss activity while standing with no support 3 x for at least 5-10 minutes with rest break between.  LOS in retropulsion 3 x   Use of grooved pegboard and use of tweezers for fine motor manipulation and dexterity of left hand with minimal hiking of shoulder.           Time-based treatments/modalities:  Neuromusc re-ed minutes (CPT 87439): 45 minutes        Pain rating before treatment: 0  Pain rating after treatment: 0    ASSESSMENT:   Response to treatment: Progressing well with Fair + dynamic standing balance without use of assistive device for support with focus on work related tasks that require good balance and stamina.       PLAN/RECOMMENDATIONS:   Plan for treatment: therapy treatment to continue next visit.  Planned interventions for next visit: continue with current treatment and neuromuscular re-education (CPT 03398)

## 2022-06-29 ENCOUNTER — OFFICE VISIT (OUTPATIENT)
Dept: PHYSICAL MEDICINE AND REHAB | Facility: REHABILITATION | Age: 57
End: 2022-06-29
Payer: COMMERCIAL

## 2022-06-29 VITALS — SYSTOLIC BLOOD PRESSURE: 104 MMHG | DIASTOLIC BLOOD PRESSURE: 60 MMHG | HEART RATE: 86 BPM | OXYGEN SATURATION: 98 %

## 2022-06-29 DIAGNOSIS — G81.94 LEFT HEMIPARESIS (HCC): ICD-10-CM

## 2022-06-29 DIAGNOSIS — Z89.512 HX OF BKA, LEFT (HCC): ICD-10-CM

## 2022-06-29 DIAGNOSIS — Z99.89 WALKER AS AMBULATION AID: ICD-10-CM

## 2022-06-29 DIAGNOSIS — Z86.73 HISTORY OF STROKE: Primary | ICD-10-CM

## 2022-06-29 DIAGNOSIS — Z74.09 IMPAIRED MOBILITY AND ENDURANCE: ICD-10-CM

## 2022-06-29 PROCEDURE — 99214 OFFICE O/P EST MOD 30 MIN: CPT | Performed by: PHYSICAL MEDICINE & REHABILITATION

## 2022-06-29 NOTE — PROGRESS NOTES
[facility]  PM&R Neuro Rehabilitation Clinic  [address] [place] [address]  Ph: [phone]    FOLLOW UP PATIENT EVALUATION      Patient Name: Israel Aviles   Patient : 1965  Patient Age: 56 y.o.     Examining Physician: Dr. Lacy Cheng, DO    PM&R History to Date - Background Information:  Dates of Admission/Discharge to ARU: 2022-3/12/2022    SUBJECTIVE:   Patient Identification: Israel Aviles is a 56 y.o. male with PMH significant for hypertension, hyperlipidemia type 2 diabetes, coronary artery disease, history of left frontal stroke in , Left BKA in 2019 and rehabilitation history significant for right ischemic CVA 2022 and is presenting to PM&R clinic for a FOLLOW UP OUTPATIENT evaluation with the following chief complaint/s:    Chief Complaint: Left lower extremity weakness.    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   - Still off of work.   - Was off of the Gabapentin but it was restarted to help him sleep. Takes 100mg at night.   - Does not have any neuropathic or phantom limb pain.   - Interested in getting massage.   - Left arm getting better, but the left leg is still weak.   - Has no problem standing with the walker, but turns and fast movements are difficult.   - No falls since last seen.     Review of Systems:  All other pertinent positive review of systems are noted above in HPI.   All other systems reviewed and are negative.    Past Medical History:  Past Medical History:   Diagnosis Date   • CAD (coronary artery disease)    • Diabetes (HCC)    • Hyperlipidemia    • Hypertension       Past Surgical History:   Procedure Laterality Date   • KNEE AMPUTATION BELOW Left 2019    Procedure: AMPUTATION, BELOW KNEE;  Surgeon: Koby Zhao M.D.;  Location: SURGERY [facility];  Service: Orthopedics   • ZZZ CARDIAC CATH  2019    multi-vessel  disease   • IRRIGATION & DEBRIDEMENT ORTHO Left 6/24/2019    Procedure: IRRIGATION AND DEBRIDEMENT, WOUND-FOOT, BIOLOGIC PLACEMENT, WOUND VAC PLACEMENT;  Surgeon: Charles Chopra M.D.;  Location: SURGERY [facility];  Service: Orthopedics        Current Outpatient Medications:   •  gabapentin (NEURONTIN) 100 MG Cap, Take 1 Capsule by mouth at bedtime as needed (pain/insomnia)., Disp: 30 Capsule, Rfl: 11  •  glipiZIDE (GLUCOTROL) 5 MG Tab, Take 0.5 Tablets by mouth 2 times daily, before breakfast and dinner., Disp: 180 Tablet, Rfl: 3  •  metFORMIN (GLUCOPHAGE) 500 MG Tab, Take 1 Tablet by mouth 2 times a day with meals., Disp: 180 Tablet, Rfl: 3  •  atorvastatin (LIPITOR) 80 MG tablet, Take 1 Tablet by mouth every day for 90 days., Disp: 90 Tablet, Rfl: 3  •  LUMIGAN 0.01 % Solution, INSTILL 1 DROP INTO EACH EYE AT BEDTIME, Disp: , Rfl:   •  clopidogrel (PLAVIX) 75 MG Tab, Take 1 Tablet by mouth every day., Disp: 30 Tablet, Rfl: 2  •  metoprolol SR (TOPROL XL) 25 MG TABLET SR 24 HR, Take 2 Tablets by mouth 2 times a day. (Patient taking differently: Take 50 mg by mouth 2 times a day. Indications: takes 1/2 in am 1/2 pm), Disp: 120 Tablet, Rfl: 2  •  vitamin D3 2000 UNIT Tab, Take 1 Tablet by mouth every day., Disp: 60 Tablet, Rfl:   •  multivitamin (THERAGRAN) Tab, Take 1 Tablet by mouth every day., Disp: , Rfl:   •  aspirin 81 MG EC tablet, Take 1 tablet by mouth every day., Disp: 30 tablet, Rfl: 2  No Known Allergies     Past Social History:  Social History     Socioeconomic History   • Marital status:      Spouse name: Not on file   • Number of children: Not on file   • Years of education: Not on file   • Highest education level: Not on file   Occupational History   • Not on file   Tobacco Use   • Smoking status: Never Smoker   • Smokeless tobacco: Never Used   Vaping Use   • Vaping Use: Never used   Substance and Sexual Activity   • Alcohol use: No   • Drug use: No   • Sexual activity: Not on file    Other Topics Concern   • Not on file   Social History Narrative   • Not on file     Social Determinants of Health     Financial Resource Strain: Not on file   Food Insecurity: Not on file   Transportation Needs: Not on file   Physical Activity: Not on file   Stress: Not on file   Social Connections: Not on file   Intimate Partner Violence: Not on file   Housing Stability: Not on file        Family History:  Family History   Problem Relation Age of Onset   • Diabetes Mother    • Diabetes Father        Depression and Opioid Screening  PHQ-9:  Depression Screen (PHQ-2/PHQ-9) 3/8/2022 3/11/2022 4/20/2022   PHQ-2 Total Score 0 0 -   PHQ-2 Total Score - - 3   PHQ-9 Total Score - - 8     Interpretation of PHQ-9 Total Score   Score Severity   1-4 No Depression   5-9 Mild Depression   10-14 Moderate Depression   15-19 Moderately Severe Depression   20-27 Severe Depression     OBJECTIVE:   Vital Signs:  Vitals:    06/29/22 0936   BP: 104/60   Pulse: 86   SpO2: 98%        Pertinent Labs:  Lab Results   Component Value Date/Time    SODIUM 138 03/02/2022 06:04 AM    POTASSIUM 4.1 03/02/2022 06:04 AM    CHLORIDE 101 03/02/2022 06:04 AM    CO2 26 03/02/2022 06:04 AM    GLUCOSE 134 (H) 03/02/2022 06:04 AM    BUN 18 03/02/2022 06:04 AM    CREATININE 0.74 03/02/2022 06:04 AM    CREATININE 0.9 05/09/2007 01:20 AM    BUNCREATRAT 14 11/10/2021 06:52 AM       Lab Results   Component Value Date/Time    HBA1C 7.7 (A) 06/16/2022 09:45 AM       Lab Results   Component Value Date/Time    WBC 5.1 03/02/2022 06:04 AM    RBC 4.20 (L) 03/02/2022 06:04 AM    HEMOGLOBIN 12.9 (L) 03/02/2022 06:04 AM    HEMATOCRIT 38.4 (L) 03/02/2022 06:04 AM    MCV 91.4 03/02/2022 06:04 AM    MCH 30.7 03/02/2022 06:04 AM    MCHC 33.6 (L) 03/02/2022 06:04 AM    MPV 10.5 03/02/2022 06:04 AM    NEUTSPOLYS 46.70 02/25/2022 05:53 AM    LYMPHOCYTES 42.70 (H) 02/25/2022 05:53 AM    MONOCYTES 8.20 02/25/2022 05:53 AM    EOSINOPHILS 1.60 02/25/2022 05:53 AM    BASOPHILS  0.40 02/25/2022 05:53 AM    ANISOCYTOSIS 1+ 12/20/2019 03:00 AM       Lab Results   Component Value Date/Time    ASTSGOT 31 02/25/2022 05:53 AM    ALTSGPT 30 02/25/2022 05:53 AM        Physical Exam:   GEN: No apparent distress  HEENT: Head normocephalic, atraumatic.  Sclera nonicteric bilaterally, no ocular discharge appreciated bilaterally.  CV: Extremities warm and well-perfused, no peripheral edema appreciated bilaterally.  PULMONARY: Breathing nonlabored on room air, no respiratory accessory muscle use.  Not requiring supplemental oxygen.  SKIN: No appreciable skin breakdown on exposed areas of skin.  PSYCH: Mood and affect within normal limits.  NEURO: Awake alert.  Conversational.  Logical thought content.    Motor Exam Upper Extremities   ? Myotome R L   Shoulder Abduction C5 5 4   Elbow flexion C5 5 4   Wrist extension C6 5 4   Elbow extension C7 5 4-   Finger flexion C8 5 5-   Finger abduction T1 5 5-       Motor Exam Lower Extremities  ? Myotome R L   Hip flexion L2 5 4-   Knee extension L3 5 4   Ankle dorsiflexion L4 5 BKA   Toe extension L5 5 BKA   Ankle plantarflexion S1 5 BKA     Ambulatory with walker and prosthetic limb left lower extremity.  João's negative bilaterally.      ASSESSMENT/PLAN: Israel Aviles  is a 56 y.o. male with rehabilitation history significant for right ischemic CVA 2/17/2022, here for evaluation. The following plan was discussed with the patient who is in agreement.     Visit Diagnoses     ICD-10-CM   1. History of stroke  Z86.73   2. Left hemiparesis ([facility])  G81.94   3. Hx of BKA, left ([facility])  Z89.512   4. Walker as ambulation aid  Z99.89   5. Impaired mobility and endurance  Z74.09        Promila, spouse, assists with history.    Rehab/Neuro/Ortho:   1. Right ischemic CVA 2/17/2022  2. History of left BKA 12/2019 Dr. Zhao  3. History of left-sided ischemic CVA 6/2018  - Driving status: Currently not driving.   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  Able to stand with walker, will have difficulty moving around quickly to multitask such as grabbing items for customers and using walker simultaneously.  - Time spent drafting letter to excuse patient from jury duty due to his recent stroke.  - Coordination of care: With insurance company to get patient into physical therapy as he has been unable to start thus far.  - Therapy: Established with outpatient physical and occupational therapy.  Thru September.    Assessment of Current Functional Status: 4/20/2022 ambulatory with walker, left BKA prosthetic limb.  Weakness on the left side at a moderate level. 6/29/2022 more ambulatory than he had been prior.  Not using a wheelchair often.  Able to stand and walk with walker but difficult to multitask using walker    Nociceptive pain:  1.  Suspect supraspinatus tendinitis versus subdeltoid/subacromial bursitis  - Status: Improved.    Neuropathic pain/difficulty sleeping:  - Med management: Reinitiated 100 mg at night as needed.  Does not use for pain.    Mood:  1.  Situational depression  -Had been prescribed Prozac but patient no longer taking.  -Wife indicates he would benefit from counseling, patient thinks this would not be helpful because he would be okay if he did not have the left lower extremity weakness.    Follow up: 3 months for reevaluation, return to work, return to driving.    Total time spent was 22 minutes.  Included in this time is the time spent preparing for the visit including record review, my exam and evaluation, counseling and education regarding that which is aforementioned in the assessment and plan.  Time was spent documenting into patient's electronic health record.  Time was spent ordering the appropriate labs, tests, procedures, referrals, medications. Included this time as the time spent obtaining history from someone other than the patient.  Some of the time included occurred outside of charting time.   Discussion involved the patient and wife.    Please note that this dictation was created using voice recognition software. I have made every reasonable attempt to correct obvious errors but there may be errors of grammar and content that I may have overlooked prior to finalization of this note.    Dr. Lacy Cheng DO, MS  Department of Physical Medicine & Rehabilitation  Neuro Rehabilitation Clinic  [facility]

## 2022-06-29 NOTE — TELEPHONE ENCOUNTER
Mahesh Segovia M.D.  You 22 hours ago (4:32 PM)         Sounds good. Can hold plavix 7 days prior and resume when hemostasis achieved. Continue aspirin. Thanks Angelina!    Message text       ----------------------  Letter drafted and faxed to [phone]  Confirmation status received  Scan to [facility]

## 2022-06-30 ENCOUNTER — OCCUPATIONAL THERAPY (OUTPATIENT)
Dept: OCCUPATIONAL THERAPY | Facility: REHABILITATION | Age: 57
End: 2022-06-30
Attending: NURSE PRACTITIONER
Payer: COMMERCIAL

## 2022-06-30 ENCOUNTER — PHYSICAL THERAPY (OUTPATIENT)
Dept: PHYSICAL THERAPY | Facility: REHABILITATION | Age: 57
End: 2022-06-30
Attending: NURSE PRACTITIONER
Payer: COMMERCIAL

## 2022-06-30 DIAGNOSIS — I69.30 LATE EFFECT OF STROKE: ICD-10-CM

## 2022-06-30 PROCEDURE — 97116 GAIT TRAINING THERAPY: CPT

## 2022-06-30 PROCEDURE — 97112 NEUROMUSCULAR REEDUCATION: CPT

## 2022-06-30 PROCEDURE — 97110 THERAPEUTIC EXERCISES: CPT

## 2022-06-30 NOTE — OP THERAPY DAILY TREATMENT
Outpatient Occupational Therapy  DAILY TREATMENT     [facility] Occupational [address]  [address]  [place] [address]  Phone:  [phone]  Fax:  [phone]    Date: 06/30/2022    Patient: Manan Aviles  YOB: 1965  MRN: [id number]     Time Calculation  Start time: 0800  Stop time: 0845 Time Calculation (min): 45 minutes         Chief Complaint: Extremity Weakness and Self Care Duties    Visit #: 19    SUBJECTIVE:  I still have some blurry vision in my left eye    OBJECTIVE:  Current objective measures:     All left upper extremity active range of motion: All within functional limits     Strength:   Upper extremity strength (left):     Shoulder flexion: 4    Shoulder extension:  4    Shoulder abduction: 4    Shoulder adduction: 4    Shoulder external rotation:  4    Shoulder internal rotation: 4    Elbow flexion: 4    Elbow extension: 4    Forearm pronation: 4    Forearm supination: 4    Wrist flexion: 4    Wrist extension: 4    Fingers flexion: 4    Fingers extension: 4    Fingers abduction: 4    Fingers adduction: 4     Strength Comments:   strength:  R=84 pounds   L= 68 pounds     Pinch strength:              R         L  Lateral   20    18  3 point   20      17     Tone, Sensation and Coordination:   Tone:     Left upper extremity muscle tone: Normal     Sensation   Upper extremity (left):     Light touch: Intact    Sharp/dull: Intact     Stereognosis: Intact     Proprioception: Intact      Sensation comments:   Patient feels that the temperature in his fluctuates from hot and cold.      Coordination   Upper extremity (left):     Fine motor: Impaired    Gross motor: Impaired    Slow alternating movements: Impaired    Rapid alternating movements: Impaired    Finger to finger: Impaired    Finger touch to nose: Impaired      Coordination comments:   9 hole peg test:  R= 27 seconds   L= 47 seconds      Cognition:     Orientation: normal to time, normal to place,  normal to person and normal to situation    Direction following: two step     Cognition Comments:  Further assessment to be completed      Vision/Perception:     Formal vision perception assessment needed.     Activities of Daily Living:   Transfers/Mobility:    Mod I      Toileting:     Mod I     Bathing:     Mod I     Dressing:   Mod I   Grooming:   Mod I     Feeding:     Feeding: independent     Household Management:     Housekeeping: min A     Laundry: Mod I folding clothing.    Meal preparation: min A     Medication management: mod I    Money management: Min  assist    Shopping: unable    Telephone use: supervision    Writing: supervision     ADL Comments:  Impaired standing tolerance during ADLs.  Normally completes most seated at home.  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 Neuromuscular Re-education (CPT 44710)    Therapeutic Treatments and Modalities Summary: Focus today on dynamic standing balance and standing endurance during functional tasks and left fine motor manipulation and dexterity  UBE while standing on resistance 4 for 10 minutes, without rest  Dynamic standing and functional mobility exercises focusing on enhancing balance and endurance with reaching activity and weight shifting while mobilizing with FWW.   3# free weight exercises for Left UE in overhead reach with FF, abduction and external rotation at 0 degrees.  1 set of 5 in each direction             Time-based treatments/modalities:  Neuromusc re-ed minutes (CPT 12969): 45 minutes        Pain rating before treatment: 0  Pain rating after treatment: 0    ASSESSMENT:   Response to treatment: Fair + dynamic balance today with no LOB    PLAN/RECOMMENDATIONS:   Plan for treatment: therapy treatment to continue next visit.  Planned interventions  for next visit: continue with current treatment and neuromuscular re-education (CPT 36452)

## 2022-06-30 NOTE — OP THERAPY DAILY TREATMENT
Outpatient Physical Therapy  DAILY TREATMENT     [facility] Physical Therapy [address]  [address]  [place] [address]  Phone:  [phone]  Fax:  [phone]    Date: 06/30/2022    Patient: Manan Aviles  YOB: 1965  MRN: [id number]     Time Calculation    Start time: 0945  Stop time: 1027 Time Calculation (min): 42 minutes         Chief Complaint: No chief complaint on file.    Visit #: 15    SUBJECTIVE:  Pt would like to try gait training with a cane, says he has one at home. Does not want to return to work until he can perform almost all funcitons    OBJECTIVE:        Therapeutic Exercises (CPT 63808):     20. 6/14-8/9      Therapeutic Exercise Summary: Simulated work activity for improving strength and stabilty. 5# DB positioned on table approx 5 feet behind pt. With use of SBQC x 7 min no significant LOB    STS x 10          2 therEx, 2 gait  Auth approved   Auth ID: [id number]  06/24/2022-09/21/2022  12 visits     Therapeutic Treatments and Modalities:     1. Gait Training (CPT 06347)    Therapeutic Treatment and Modalities Summary: Gait training with SBQC 3 x 262 feet with SPV. Max cues to encouraged head turns and scanning environment with limited carry over. When asked pt reports he prefers to focus on his leg.  [place] carry to simulate work/ADL function with 5# DB and SBQC x 500 feet. No LOB, min response when cued for head turns.    Significant education related to stabilty and returning to work.     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 Encouraged pt to attempt during slow times. Attempted to simulate work  activities pt was concerned about including turning/walking. Continued to remind pt it is not necessary to be at his expected 100% before returning to work as this could be a way to improve LE strength, balance, and provide more mental stimulation fort the patient. After extensive discussion pt reported he did to feel ready. Will continue to encourage. Encouraged ambulation at home with SBQC while making a point of performing head turns.  PLAN/RECOMMENDATIONS:   Plan for treatment: therapy treatment to continue next visit.  Planned interventions for next visit: continue with current treatment.

## 2022-07-02 ENCOUNTER — PATIENT MESSAGE (OUTPATIENT)
Dept: CARDIOLOGY | Facility: MEDICAL CENTER | Age: 57
End: 2022-07-02
Payer: COMMERCIAL

## 2022-07-02 DIAGNOSIS — R00.2 PALPITATIONS: ICD-10-CM

## 2022-07-02 DIAGNOSIS — I50.20 HFREF (HEART FAILURE WITH REDUCED EJECTION FRACTION) (HCC): ICD-10-CM

## 2022-07-02 DIAGNOSIS — I25.83 CORONARY ARTERY DISEASE DUE TO LIPID RICH PLAQUE: ICD-10-CM

## 2022-07-02 DIAGNOSIS — I25.5 ISCHEMIC CARDIOMYOPATHY: ICD-10-CM

## 2022-07-02 DIAGNOSIS — I47.10 SVT (SUPRAVENTRICULAR TACHYCARDIA) (HCC): ICD-10-CM

## 2022-07-02 DIAGNOSIS — I25.10 CORONARY ARTERY DISEASE DUE TO LIPID RICH PLAQUE: ICD-10-CM

## 2022-07-02 DIAGNOSIS — E78.2 MIXED HYPERLIPIDEMIA: ICD-10-CM

## 2022-07-05 ENCOUNTER — OCCUPATIONAL THERAPY (OUTPATIENT)
Dept: OCCUPATIONAL THERAPY | Facility: REHABILITATION | Age: 57
End: 2022-07-05
Attending: NURSE PRACTITIONER
Payer: COMMERCIAL

## 2022-07-05 ENCOUNTER — PHYSICAL THERAPY (OUTPATIENT)
Dept: PHYSICAL THERAPY | Facility: REHABILITATION | Age: 57
End: 2022-07-05
Attending: NURSE PRACTITIONER
Payer: COMMERCIAL

## 2022-07-05 DIAGNOSIS — I69.30 LATE EFFECT OF STROKE: ICD-10-CM

## 2022-07-05 PROCEDURE — 97116 GAIT TRAINING THERAPY: CPT

## 2022-07-05 PROCEDURE — 97110 THERAPEUTIC EXERCISES: CPT

## 2022-07-05 PROCEDURE — 97112 NEUROMUSCULAR REEDUCATION: CPT

## 2022-07-05 NOTE — OP THERAPY DAILY TREATMENT
Outpatient Occupational Therapy  DAILY TREATMENT     [facility] Occupational [address].  [address]  [place] [address]  Phone:  [phone]  Fax:  [phone]    Date: 07/05/2022    Patient: Manan Aviles  YOB: 1965  MRN: [id number]     Time Calculation  Start time: 0915  Stop time: 1000 Time Calculation (min): 45 minutes         Chief Complaint: Extremity Weakness and Self Care Duties    Visit #: 20    SUBJECTIVE:  No issues noted since last visit.      OBJECTIVE:  Current objective measures:     All left upper extremity active range of motion: All within functional limits     Strength:   Upper extremity strength (left):     Shoulder flexion: 4    Shoulder extension:  4    Shoulder abduction: 4    Shoulder adduction: 4    Shoulder external rotation:  4    Shoulder internal rotation: 4    Elbow flexion: 4    Elbow extension: 4    Forearm pronation: 4    Forearm supination: 4    Wrist flexion: 4    Wrist extension: 4    Fingers flexion: 4    Fingers extension: 4    Fingers abduction: 4    Fingers adduction: 4     Strength Comments:   strength:  R=84 pounds   L= 68 pounds     Pinch strength:              R         L  Lateral   20    18  3 point   20      17     Tone, Sensation and Coordination:   Tone:     Left upper extremity muscle tone: Normal     Sensation   Upper extremity (left):     Light touch: Intact    Sharp/dull: Intact     Stereognosis: Intact     Proprioception: Intact      Sensation comments:   Patient feels that the temperature in his fluctuates from hot and cold.      Coordination   Upper extremity (left):     Fine motor: Impaired    Gross motor: Impaired    Slow alternating movements: Impaired    Rapid alternating movements: Impaired    Finger to finger: Impaired    Finger touch to nose: Impaired      Coordination comments:   9 hole peg test:  R= 27 seconds   L= 47 seconds      Cognition:     Orientation: normal to time, normal to place, normal to  person and normal to situation    Direction following: two step     Cognition Comments:  Further assessment to be completed      Vision/Perception:     Formal vision perception assessment needed.     Activities of Daily Living:   Transfers/Mobility:    Mod I      Toileting:     Mod I     Bathing:     Mod I     Dressing:   Mod I   Grooming:   Mod I     Feeding:     Feeding: independent     Household Management:     Housekeeping: min A     Laundry: Mod I folding clothing.    Meal preparation: min A     Medication management: mod I    Money management: Min  assist    Shopping: unable    Telephone use: supervision    Writing: supervision     ADL Comments:  Impaired standing tolerance during ADLs.  Normally completes most seated at home.  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 Neuromuscular Re-education (CPT 86857)    Therapeutic Treatments and Modalities Summary: Focus today on dynamic standing balance and standing endurance during functional tasks and left fine motor manipulation and dexterity  9# digi-flex 10 x  Flexion/extension exercise stick in horizontal and vertical positions 20 x in each direction  Wrist maze 3 x   Use of tweezers and screwdriver for fine motor manipulation   Dynamic standing and functional mobility exercises focusing on enhancing balance and endurance with reaching activity and weight shifting   3# free weight exercises for Left UE in overhead reach with FF, abduction and external rotation at 0 degrees.  1 set of 5 in each direction             Time-based treatments/modalities:  Neuromusc re-ed minutes (CPT 07996): 45 minutes        Pain rating before treatment: 0  Pain rating after treatment: 0    ASSESSMENT:   Response to treatment: Good - dynamic standing balance noted during activity.  No  hiking of shoulder noted during functional use of left UE    PLAN/RECOMMENDATIONS:   Plan for treatment: therapy treatment to continue next visit.  Planned interventions for next visit: continue with current treatment and neuromuscular re-education (CPT 27283)

## 2022-07-05 NOTE — OP THERAPY DAILY TREATMENT
Outpatient Physical Therapy  DAILY TREATMENT     [facility] Physical [address].  [address]  [place] [address]  Phone:  [phone]  Fax:  [phone]    Date: 07/05/2022    Patient: Manan Aviles  YOB: 1965  MRN: [id number]     Time Calculation    Start time: 0815  Stop time: 0857 Time Calculation (min): 42 minutes         Chief Complaint: Difficulty Walking, Loss Of Balance, and Weakness    Visit #: 16    SUBJECTIVE:  Noted Rt calf pain. Is walking consistently inside with cane.     OBJECTIVE:        Therapeutic Exercises (CPT 11123):     1. Reaching outside GILLIAN simulating stocking shelvesx 4 min, cues to use stepping strategy only when neccessary with some carry over, CGA, 1 LOB reaching low/squat    2. Standing gastroc stretch, 3 x 1 min    20. 6/14-8/9      Therapeutic Exercise Summary:     2 therEx, 2 gait  Auth approved   Auth ID: [id number]  06/24/2022-09/21/2022  12 visits     Therapeutic Treatments and Modalities:     1. Gait Training (CPT 16818)    Therapeutic Treatment and Modalities Summary: Gait training  feet x 2 with SPV. Good stabilty noted with SPC. Education on SPV as bridge for no AD and better confidence.  Work simulated nativity pt bending down to  box approx 5# and carry 262 feet with frequent head turns. No LOB, cues for head turns. Significant fatigue with dec speed each trial. Completed x 3 trials seated rests between.    Time-based treatments/modalities:    Physical Therapy Timed Treatment Charges  Gait training minutes (CPT 17924): 30 minutes  Therapeutic exercise minutes (CPT 41081): 12 minutes      ASSESSMENT:   Response to treatment:Some improvement with head turns as well as good stabilty with progression to SPC from SBQC. Pt continues to be fearful and reluctant with instructions due to pt reports of balance and instabilty.     PLAN/RECOMMENDATIONS:   Plan for treatment: therapy treatment to continue next visit.  Planned  interventions for next visit: continue with current treatment.

## 2022-07-11 ENCOUNTER — APPOINTMENT (OUTPATIENT)
Dept: OCCUPATIONAL THERAPY | Facility: REHABILITATION | Age: 57
End: 2022-07-11
Attending: NURSE PRACTITIONER
Payer: COMMERCIAL

## 2022-07-11 ENCOUNTER — PHYSICAL THERAPY (OUTPATIENT)
Dept: PHYSICAL THERAPY | Facility: REHABILITATION | Age: 57
End: 2022-07-11
Attending: NURSE PRACTITIONER
Payer: COMMERCIAL

## 2022-07-11 DIAGNOSIS — I69.30 LATE EFFECT OF STROKE: ICD-10-CM

## 2022-07-11 PROCEDURE — 97116 GAIT TRAINING THERAPY: CPT

## 2022-07-11 PROCEDURE — 97110 THERAPEUTIC EXERCISES: CPT

## 2022-07-11 PROCEDURE — 97112 NEUROMUSCULAR REEDUCATION: CPT

## 2022-07-11 NOTE — OP THERAPY DAILY TREATMENT
"  Outpatient Physical Therapy  DAILY TREATMENT     [facility] Physical [address].  [address]  [place] [address]  Phone:  [phone]  Fax:  [phone]    Date: 07/11/2022    Patient: Manan Aviles  YOB: 1965  MRN: [id number]     Time Calculation    Start time: 1015  Stop time: 1100 Time Calculation (min): 45 minutes         Chief Complaint: Difficulty Walking    Visit #: 17    SUBJECTIVE:  Got a new socket for LLE prosthesis, getting used to it. Feels weird right now.      OBJECTIVE:        Therapeutic Exercises (CPT 01638):     1. Reaching outside GILLIAN simulating stocking shelvesx 4 min, cues to use stepping strategy only when neccessary with some carry over, 1 set stacking front/back and mat/table with SPC.  1 set without AD with CGA at gait belt, right/left mat/table    2. Chair squat at //bars with light UE support, pt reports too much pressure on L thigh/ quad    20. 6/14-8/9      Therapeutic Exercise Summary:     2 therEx, 2 gait  Auth approved   Auth ID: [id number]  06/24/2022-09/21/2022  12 visits     Therapeutic Treatments and Modalities:     1. Gait Training (CPT 93900)    Therapeutic Treatment and Modalities Summary: Gait training  feet with SPV. Good stabilty noted with SPC.   Stride stance balance with R foot on balance pod x1 min with CGA at gait belt.  Tactile and verbal cues to stand tall, lift head and for L hip and spine extension. R foot tap to balance pod x15 with single UE ->no UE support at //bars with CGA/Hari at gait belt.  Lateral steps over balance pods at //bars with BUE support 3x4B with BUE support at //bars.  R step through from 2\" step with CGA at delfino x15 with single UE -> no UE support at //bars.  Gait without AD, with CGA at gait belt, holding 12\" hurdles, and placing them on line on floor, then picking them up. CGA/Hari for placing hurdles on floor.  When placing hurdles, pt used small squat, versus when retrieving hurdles, used " flexion at hip.  Gait in //bars, without UE support, with CGA at gait belt, step then reach ipsilateral UE overhead, then repeat with other LE/UE, x20 feet.  With 3# weights in hands, step then reach contralateral UE overhead, then repeat with other LE/UE x20 feet.  Stride stance balance with 3# weights in hands- B shoulder flex 90deg, elbows flexed bicep position, and elbows flexed shoulders abd (chicken wing position) x10 sec each with CGA, bilateral.    Time-based treatments/modalities:    Physical Therapy Timed Treatment Charges  Gait training minutes (CPT 91194): 30 minutes    ASSESSMENT:   Response to treatment: Pt demo fair gait, limited LLE single leg stance, and decreased LLE functional strength.      PLAN/RECOMMENDATIONS:   Plan for treatment: therapy treatment to continue next visit.  Planned interventions for next visit: continue with current treatment.  Progress gait training with functional activity (carrying/ reaching/ multi-plane movement).

## 2022-07-11 NOTE — OP THERAPY DAILY TREATMENT
Outpatient Occupational Therapy  DAILY TREATMENT     [facility] Occupational Therapy [address].  [address]  [place] [address]  Phone:  [phone]  Fax:  [phone]    Date: 07/11/2022    Patient: Manan Aviles  YOB: 1965  MRN: [id number]     Time Calculation  Start time: 0930  Stop time: 1015 Time Calculation (min): 45 minutes         Chief Complaint: Extremity Weakness and Self Care Duties    Visit #: 21    SUBJECTIVE:  I can see better now, it is clearing up     OBJECTIVE:  Current objective measures:     All left upper extremity active range of motion: All within functional limits     Strength:   Upper extremity strength (left):     Shoulder flexion: 4    Shoulder extension:  4    Shoulder abduction: 4    Shoulder adduction: 4    Shoulder external rotation:  4    Shoulder internal rotation: 4    Elbow flexion: 4    Elbow extension: 4    Forearm pronation: 4    Forearm supination: 4    Wrist flexion: 4    Wrist extension: 4    Fingers flexion: 4    Fingers extension: 4    Fingers abduction: 4    Fingers adduction: 4     Strength Comments:   strength:  R=84 pounds   L= 68 pounds     Pinch strength:              R         L  Lateral   20    18  3 point   20      17     Tone, Sensation and Coordination:   Tone:     Left upper extremity muscle tone: Normal     Sensation   Upper extremity (left):     Light touch: Intact    Sharp/dull: Intact     Stereognosis: Intact     Proprioception: Intact      Sensation comments:   Patient feels that the temperature in his fluctuates from hot and cold.      Coordination   Upper extremity (left):     Fine motor: Impaired    Gross motor: Impaired    Slow alternating movements: Impaired    Rapid alternating movements: Impaired    Finger to finger: Impaired    Finger touch to nose: Impaired      Coordination comments:   9 hole peg test:  R= 27 seconds   L= 46 seconds      Cognition:     Orientation: normal to time, normal to place, normal  to person and normal to situation    Direction following: two step     Cognition Comments:  Further assessment to be completed      Vision/Perception:     Formal vision perception assessment needed.     Activities of Daily Living:   Transfers/Mobility:    Mod I      Toileting:     Mod I     Bathing:     Mod I     Dressing:   Mod I   Grooming:   Mod I     Feeding:     Feeding: independent     Household Management:     Housekeeping: min A     Laundry: Mod I folding clothing.    Meal preparation: min A     Medication management: mod I    Money management: Min  assist    Shopping: unable    Telephone use: supervision    Writing: supervision     ADL Comments:  Impaired standing tolerance during ADLs.  Normally completes most seated at home.  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 Neuromuscular Re-education (CPT 28950)    Therapeutic Treatments and Modalities Summary: Focus today on dynamic standing balance and standing endurance during functional tasks and left fine motor manipulation and dexterity  UBE while standing on resistance 4 for 10 minutes and 40 seconds without rest break  9# digi-flex 10 x  11# graded pinch clip with lateral pinch and 3 point pinch 10 x in each position.   Wrist maze 3 x   Use of tweezers and screwdriver for fine motor manipulation    3# free weight exercises for Left UE for biceps/triceps and in overhead reach with FF, abduction and external rotation at 0 degrees.  1 set of 10 in each direction             Time-based treatments/modalities:  Neuromusc re-ed minutes (CPT 07139): 45 minutes        Pain rating before treatment: 0  Pain rating after treatment: 0    ASSESSMENT:   Response to treatment: improving dexterity/manipulation of left hand.  Increasing standing tolerance noted  today    PLAN/RECOMMENDATIONS:   Plan for treatment: therapy treatment to continue next visit.  Planned interventions for next visit: continue with current treatment and neuromuscular re-education (CPT 74462)

## 2022-07-13 ENCOUNTER — PHYSICAL THERAPY (OUTPATIENT)
Dept: PHYSICAL THERAPY | Facility: REHABILITATION | Age: 57
End: 2022-07-13
Attending: NURSE PRACTITIONER
Payer: COMMERCIAL

## 2022-07-13 DIAGNOSIS — I69.30 LATE EFFECT OF STROKE: ICD-10-CM

## 2022-07-13 PROCEDURE — 97116 GAIT TRAINING THERAPY: CPT

## 2022-07-13 PROCEDURE — 97110 THERAPEUTIC EXERCISES: CPT

## 2022-07-13 NOTE — OP THERAPY DAILY TREATMENT
Outpatient Physical Therapy  DAILY TREATMENT     [facility] Physical [address].  [address]  [place] [address]  Phone:  [phone]  Fax:  [phone]    Date: 07/13/2022    Patient: Manan Aviles  YOB: 1965  MRN: [id number]     Time Calculation    Start time: 0845  Stop time: 0927 Time Calculation (min): 42 minutes         Chief Complaint: Weakness, Difficulty Walking, and Loss Of Balance    Visit #: 18    SUBJECTIVE:  Complains of right quad/hip flexor pain while walking. Reports he is too tired to attempt walking outside. States he is so tired he wanted to go back to sleep.   OBJECTIVE:        Therapeutic Exercises (CPT 29386):     2. Kem stretch Lt LE, 2 x 1 min    3. Sidelyin SL hip abduction, 2 x 10 B, severe difficulty//fatigue.Max cues dec knee flexion and hip flexion compensation Left>Rt    20. 6/14-8/9      Therapeutic Exercise Summary:     2 therEx, 2 gait  Auth approved   Auth ID: [id number]  06/24/2022-09/21/2022  12 visits     Addede Kem braxton, gastroc stret     Therapeutic Treatments and Modalities:     1. Gait Training (CPT 65726)    Therapeutic Treatment and Modalities Summary: Gait training  feet no LOB, cues to facilitate head turns without response. Reported very fatigued 9/10 after requiring rest    feet carrying box to simluate work specific tasks SBA. Max cues with almost no patient carry over for scanning and head turns.  No LOB. Able to negotiate opening inward and outward of doors in clinic without LOB.    Time-based treatments/modalities:    Physical Therapy Timed Treatment Charges  Gait training minutes (CPT 10562): 27 minutes  Therapeutic exercise minutes (CPT 28366): 15 minutes      ASSESSMENT:   Response to treatment:Pt refused outdoor gait training stating he was too tired however continued to educate on the importance for progressing independence. Continues to minimally respond to verbal cues to facilitate head  turns. Had severe difficulty performing side lying hip abduction. Pt completed 18 visits and continues to have strength balance and gait abnormalities impacting function. Will continue to benefit from skilled PT.     Short Term Goals:   1. Pt no longer using w/c for mobility.- Met  3. Pt demo more symmetrical gait pattern for safety and minimized fall risk.- PARTIALLY MET, CONTINUE.  4. Pt able to walk 400 feet with QC/SPC Zac in home and in community.- Met  Short term goal time span:  2-4 weeks      Long Term Goals:    1. Pt able to perform sit <-> stand from standard chair without UE support.- NOT MET, CONTINUE.  2. Pt able to walk in home without AD.- PARTIALLY MET, CONTINUE.  3. Pt will demo increased function via improved scores on TUG. - MET, CONTINUE.    PLAN/RECOMMENDATIONS:   Plan for treatment: therapy treatment to continue next visit.  Planned interventions for next visit: continue with current treatment.

## 2022-07-14 ENCOUNTER — APPOINTMENT (OUTPATIENT)
Dept: OCCUPATIONAL THERAPY | Facility: REHABILITATION | Age: 57
End: 2022-07-14
Attending: NURSE PRACTITIONER
Payer: COMMERCIAL

## 2022-07-18 ENCOUNTER — PHYSICAL THERAPY (OUTPATIENT)
Dept: PHYSICAL THERAPY | Facility: REHABILITATION | Age: 57
End: 2022-07-18
Attending: NURSE PRACTITIONER
Payer: COMMERCIAL

## 2022-07-18 ENCOUNTER — OCCUPATIONAL THERAPY (OUTPATIENT)
Dept: OCCUPATIONAL THERAPY | Facility: REHABILITATION | Age: 57
End: 2022-07-18
Attending: NURSE PRACTITIONER
Payer: COMMERCIAL

## 2022-07-18 DIAGNOSIS — I69.30 LATE EFFECT OF STROKE: ICD-10-CM

## 2022-07-18 PROCEDURE — 97110 THERAPEUTIC EXERCISES: CPT

## 2022-07-18 PROCEDURE — 97116 GAIT TRAINING THERAPY: CPT

## 2022-07-18 PROCEDURE — 97112 NEUROMUSCULAR REEDUCATION: CPT

## 2022-07-18 NOTE — OP THERAPY DAILY TREATMENT
Outpatient Occupational Therapy  DAILY TREATMENT     [facility] Occupational [address].  [address]  [place] [address]  Phone:  [phone]  Fax:  [phone]    Date: 07/18/2022    Patient: Manan Aviles  YOB: 1965  MRN: [id number]     Time Calculation  Start time: 0800  Stop time: 0845 Time Calculation (min): 45 minutes         Chief Complaint: Extremity Weakness and Self Care Duties    Visit #: 22    SUBJECTIVE:  I feel like my typing skills are a little bit better.      OBJECTIVE:  Current objective measures:     All left upper extremity active range of motion: All within functional limits     Strength:   Upper extremity strength (left):     Shoulder flexion: 4    Shoulder extension:  4    Shoulder abduction: 4    Shoulder adduction: 4    Shoulder external rotation:  4    Shoulder internal rotation: 4    Elbow flexion: 4    Elbow extension: 4    Forearm pronation: 4    Forearm supination: 4    Wrist flexion: 4    Wrist extension: 4    Fingers flexion: 4    Fingers extension: 4    Fingers abduction: 4    Fingers adduction: 4     Strength Comments:   strength:  R=84 pounds   L= 68 pounds     Pinch strength:              R         L  Lateral   20    18  3 point   20      17     Tone, Sensation and Coordination:   Tone:     Left upper extremity muscle tone: Normal     Sensation   Upper extremity (left):     Light touch: Intact    Sharp/dull: Intact     Stereognosis: Intact     Proprioception: Intact      Sensation comments:   Patient feels that the temperature in his fluctuates from hot and cold.      Coordination   Upper extremity (left):     Fine motor: Impaired    Gross motor: Impaired    Slow alternating movements: Impaired    Rapid alternating movements: Impaired    Finger to finger: Impaired    Finger touch to nose: Impaired      Coordination comments:   9 hole peg test:  R= 23 seconds   L= 43 seconds      Cognition:     Orientation: normal to time, normal to  place, normal to person and normal to situation    Direction following: two step     Cognition Comments:  Further assessment to be completed      Vision/Perception:     Formal vision perception assessment needed.     Activities of Daily Living:   Transfers/Mobility:    Mod I      Toileting:     Mod I     Bathing:     Mod I     Dressing:   Mod I   Grooming:   Mod I     Feeding:     Feeding: independent     Household Management:     Housekeeping: min A     Laundry: Mod I folding clothing.    Meal preparation: min A     Medication management: mod I    Money management: Min  assist    Shopping: unable    Telephone use: supervision    Writing: supervision     ADL Comments:  Impaired standing tolerance during ADLs.  Normally completes most seated at home.  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 Neuromuscular Re-education (CPT 40478)    Therapeutic Treatments and Modalities Summary: Focus today on dynamic standing balance and standing endurance during functional tasks and left fine motor manipulation and dexterity  UBE while standing on resistance 4 for 11 minutes without rest break  9# digi-flex 10 x  11# graded pinch clip with lateral pinch and 3 point pinch 10 x in each position.   Use of 9 hole peg test, grooved pegboard, manipulation of coins,  tweezers and screwdriver for fine motor manipulation       Upgraded theraputty to firm resistance for HEP         Time-based treatments/modalities:  Neuromusc re-ed minutes (CPT 95568): 45 minutes        Pain rating before treatment: 0  Pain rating after treatment: 0    ASSESSMENT:   Response to treatment: improved dexterity of left non-dominant hand and increased standing tolerance during functional task.      PLAN/RECOMMENDATIONS:   Plan for treatment: therapy treatment to  continue next visit.  Planned interventions for next visit: continue with current treatment and neuromuscular re-education (CPT 01963)

## 2022-07-18 NOTE — OP THERAPY DAILY TREATMENT
Outpatient Physical Therapy  DAILY TREATMENT     [facility] Physical Therapy [address]  [address]  [place] [address]  Phone:  [phone]  Fax:  [phone]    Date: 07/18/2022    Patient: Manan Aviles  YOB: 1965  MRN: [id number]     Time Calculation    Start time: 0845  Stop time: 0927 Time Calculation (min): 42 minutes         Chief Complaint: Weakness, Difficulty Walking, and Loss Of Balance    Visit #: 19    SUBJECTIVE:  Willing to try outdoor walking today. States quad pain has improved significantly with HEP stretches.   OBJECTIVE:        Therapeutic Exercises (CPT 18510):     2. SLR, x 15 B, 5 sec holds, mod/max cues for full knee ext bilat    3. Sidelying SL hip abduction, 2 x 10 B, mod/max cues for full knee ext    20. 6/14-8/9      Therapeutic Exercise Summary:     2 therEx, 2 gait  Auth approved   Auth ID: [id number]  06/24/2022-09/21/2022  12 visits     Addede arthur Blackburn     Therapeutic Treatments and Modalities:     1. Gait Training (CPT 50653)    Therapeutic Treatment and Modalities Summary: Gait training outdoors qnd uneven surfaces with SBQC. Pt ambulated 2 x 50 feet on asphalts with CGA, mod cues to inc Rt tep length. Descend ascend x 2 ramps of various lengths CGA. 1 mod A when pt stepping over threshold resulting in increased left PF resulting in knee flexion moment and LOB. Pt more fearful than unstable.  Ambulated on grass/uneven surface CGA 4 x 50feet, close SBA 2 x 50 feet with mod cues for scanning environment and Rt step length inc. Pt continued to have limited response to cues for looking up resulting in pt walking into small tree branches frequently however continued to keep gaze downward even with cues and education.  Backwards gait training on grass with SBQC CGA x 25 feet, no LOB    Time-based treatments/modalities:    Physical Therapy Timed Treatment Charges  Gait training minutes (CPT 60883): 30 minutes  Therapeutic exercise  minutes (CPT 32329): 12 minutes      ASSESSMENT:   Response to treatment:  Pt continues to rely heavily on visual input while ambulating with almost no response to therapist cues for scanning today resulting in pt walking in to small tree branches but still no change in his technique. Able to progress to outdoor walking today with SBQB with 1 instance of instabilty due to knee flexion moment and pt LOB. However, good consistency and response to vc to increase rt step length to improve momentum and dec fall risk.   PLAN/RECOMMENDATIONS:   Plan for treatment: therapy treatment to continue next visit.  Planned interventions for next visit: continue with current treatment.

## 2022-07-21 ENCOUNTER — PHYSICAL THERAPY (OUTPATIENT)
Dept: PHYSICAL THERAPY | Facility: REHABILITATION | Age: 57
End: 2022-07-21
Attending: NURSE PRACTITIONER
Payer: COMMERCIAL

## 2022-07-21 ENCOUNTER — TELEPHONE (OUTPATIENT)
Dept: VASCULAR LAB | Facility: MEDICAL CENTER | Age: 57
End: 2022-07-21

## 2022-07-21 DIAGNOSIS — I69.30 LATE EFFECT OF STROKE: ICD-10-CM

## 2022-07-21 PROCEDURE — 97116 GAIT TRAINING THERAPY: CPT

## 2022-07-21 PROCEDURE — 97110 THERAPEUTIC EXERCISES: CPT

## 2022-07-21 NOTE — OP THERAPY DAILY TREATMENT
Outpatient Physical Therapy  DAILY TREATMENT     [facility] Physical Therapy [address].  [address]  [place] [address]  Phone:  [phone]  Fax:  [phone]    Date: 07/21/2022    Patient: Manan Aviles  YOB: 1965  MRN: [id number]     Time Calculation    Start time: 0815  Stop time: 0858 Time Calculation (min): 43 minutes         Chief Complaint: Difficulty Walking, Loss Of Balance, and Weakness    Visit #: 20    7/12 approved visits  SUBJECTIVE:PN next visit  Pt reports feeling very tired today. Reports HEP compliance   OBJECTIVE:        Therapeutic Exercises (CPT 25455):     2. Standing hip ext, 2 x 15 B, max cues dec trunk flexion    3. Standing hip abd, 2 x 15 B    20. 6/14-8/9      Therapeutic Exercise Summary:     2 therEx, 2 gait  Auth approved   Auth ID: [id number]  06/24/2022-09/21/2022  12 visits     Addede arthur Blackburn     Therapeutic Treatments and Modalities:     1. Gait Training (CPT 21003)    Therapeutic Treatment and Modalities Summary: Gait training outdoors qnd uneven surfaces with SBQC. Pt ambulated 2 x 50 feet on asphalts with CGA, mod cues to inc Rt tep length. Descend ascend x 2 ramps of various lengths CGA. 1 mod A when pt stepping over threshold resulting in increased left PF resulting in knee flexion moment and LOB. Pt more fearful than unstable.  Ambulated on grass/uneven surface SBA 6 x 50 feet SBQC as well as 75 feet of asphalt and ramps.. Mod cues to increase Rt step length with pt repeatedly reporting fear/weakness/surface as to why he cannot increase step length. Similar response to therapist cues for scanning.     Lateral stepping on uneven surfaces(grass)  SBQC CGA. First step pt required max A to prevent fall after weight shift resulted in knee flexion moment    Time-based treatments/modalities:    Physical Therapy Timed Treatment Charges  Gait training minutes (CPT 79479): 25 minutes  Therapeutic exercise minutes (CPT 19023):  "18 minutes      ASSESSMENT:   Response to treatment:Added standing hip strengthening to HEP. Continued to educate on pt healing as he commented he and his wife felt he would rehab faster. Stating \"it is the same body he has always had.\" Explained CVA impacts on brain, MSK, and reviewed pt progress.Pt is limited by fear and sometimes poor carry over to therapist instructions for scanning and ways to improve gait mechanics.    PLAN/RECOMMENDATIONS:   Plan for treatment: therapy treatment to continue next visit.  Planned interventions for next visit: continue with current treatment.       "

## 2022-07-21 NOTE — TELEPHONE ENCOUNTER
[facility]     Received pharmacotherapy referral for DM from Dr. Mcnally on 6/16/22     2nd call  Pt no showed initial appt 7/18  S/w pt - he will call us back to r/s     Insurance: Chetna RAY  PCP: Non-[facility]  Locations to be seen: [facility] at [phone], fax [phone]     Nani Archuleta, DiandraD

## 2022-07-28 ENCOUNTER — APPOINTMENT (OUTPATIENT)
Dept: PHYSICAL THERAPY | Facility: REHABILITATION | Age: 57
End: 2022-07-28
Attending: NURSE PRACTITIONER
Payer: COMMERCIAL

## 2022-08-01 ENCOUNTER — NON-PROVIDER VISIT (OUTPATIENT)
Dept: MEDICAL GROUP | Facility: PHYSICIAN GROUP | Age: 57
End: 2022-08-01
Payer: COMMERCIAL

## 2022-08-01 DIAGNOSIS — E11.69 TYPE 2 DIABETES MELLITUS WITH OTHER SPECIFIED COMPLICATION, WITHOUT LONG-TERM CURRENT USE OF INSULIN (HCC): ICD-10-CM

## 2022-08-01 PROCEDURE — 99403 PREV MED CNSL INDIV APPRX 45: CPT | Performed by: FAMILY MEDICINE

## 2022-08-01 RX ORDER — POLYETHYLENE GLYCOL 3350 17 G/17G
17 POWDER, FOR SOLUTION ORAL DAILY
Qty: 1 EACH | Refills: 0 | COMMUNITY
Start: 2022-08-01 | End: 2022-09-19

## 2022-08-01 RX ORDER — DULAGLUTIDE 0.75 MG/.5ML
0.5 INJECTION, SOLUTION SUBCUTANEOUS
Qty: 1.96 ML | Refills: 1 | Status: SHIPPED | OUTPATIENT
Start: 2022-08-01 | End: 2022-09-12

## 2022-08-01 NOTE — PATIENT INSTRUCTIONS
Jevon Barnard, DiandraD, MS, BCACP, LCC  [facility]  Phone: [phone]  Fax: [phone]  On call: [phone]  General scheduling/information [phone]  For emergencies please dial 911  Please do not use MyChart for urgent matters, call the phone numbers listed above.    This note was created using voice recognition software (Dragon). The accuracy of the dictation is limited by the abilities of the software. I have reviewed the note prior to signing, however some errors in grammar and context are still possible. If you have any questions related to this note please do not hesitate to contact our office.

## 2022-08-01 NOTE — PROGRESS NOTES
Patient Consult Note     TIME IN: 1105am  TIME OUT: 12pm   Time with Pt 55    Primary care physician: Donny Mcnally M.D.    Reason for consult: Management of Uncontrolled Type 2 Diabetes    HPI:  Israel Aviles is a 56 y.o. old patient who comes in today for evaluation of above stated problem.    Allergies:  Patient has no known allergies.    Most Recent labs, A1c, and immunizations:    Lab Results   Component Value Date/Time    HBA1C 7.7 (A) 06/16/2022 09:45 AM          Lab Results   Component Value Date/Time    CHOLSTRLTOT 137 02/17/2022 09:46 AM    LDL 65 02/17/2022 09:46 AM    HDL 43 02/17/2022 09:46 AM    TRIGLYCERIDE 143 02/17/2022 09:46 AM       Lab Results   Component Value Date/Time    SODIUM 138 03/02/2022 06:04 AM    POTASSIUM 4.1 03/02/2022 06:04 AM    CHLORIDE 101 03/02/2022 06:04 AM    CO2 26 03/02/2022 06:04 AM    GLUCOSE 134 (H) 03/02/2022 06:04 AM    BUN 18 03/02/2022 06:04 AM    CREATININE 0.74 03/02/2022 06:04 AM    CREATININE 0.9 05/09/2007 01:20 AM    BUNCREATRAT 14 11/10/2021 06:52 AM     Lab Results   Component Value Date/Time    ALKPHOSPHAT 92 02/25/2022 05:53 AM    ASTSGOT 31 02/25/2022 05:53 AM    ALTSGPT 30 02/25/2022 05:53 AM    TBILIRUBIN 0.3 02/25/2022 05:53 AM    INR 1.05 02/17/2022 09:46 AM    ALBUMIN 3.9 02/25/2022 05:53 AM      Lab Results   Component Value Date/Time    MALBCRT 40 (H) 03/04/2015 09:00 AM    MICROALBUR 4.7 03/04/2015 09:00 AM     Immunization History   Administered Date(s) Administered   • Hep A/HEP B Combined Vaccine (TwinRix) 08/06/2021   • Hepatitis B Vaccine (Adol/Adult) 06/09/2008   • Influenza (IM) Preservative Free - HISTORICAL DATA 10/15/2010   • Influenza Vac Subunit Quad Inj (Pf) 12/11/2021   • Influenza Vaccine H1N1 - HISTORICAL DATA 02/27/2010   • MMR Vaccine 06/09/2008   • MODERNA SARS-COV-2 VACCINE (12+) 01/04/2022   • PFIZER PURPLE CAP SARS-COV-2 VACCINATION (12+) 04/07/2021, 04/30/2021   • TD Vaccine 06/09/2008   • Varicella Vaccine Live  06/09/2008         Medications:    Current Outpatient Medications:   •  Trulicity, 0.5 mL, Subcutaneous, Q7 DAYS  •  polyethylene glycol/lytes, 17 g, Oral, DAILY  •  gabapentin, 100 mg, Oral, QHS PRN, Taking  •  metFORMIN, 500 mg, Oral, BID WITH MEALS, Taking  •  atorvastatin, 80 mg, Oral, DAILY, Taking  •  Lumigan, INSTILL 1 DROP INTO EACH EYE AT BEDTIME, Taking  •  clopidogrel, 75 mg, Oral, DAILY, Taking  •  metoprolol SR, 50 mg, Oral, BID (Patient taking differently: 50 mg, Oral, 2 TIMES DAILY, Indications: takes 1/2 in am 1/2 pm), Taking  •  vitamin D3, 2,000 Units, Oral, DAILY, Taking  •  multivitamin, 1 Tablet, Oral, DAILY, Taking  •  aspirin, 81 mg, Oral, DAILY, Taking    Diabetes Medication Current Regimen:  Metformin 500mg twice daily   Glipizide 5mg 1/2 tab twice daily     History/ Failed Diabetic Medications if applicable:  jardiance low BP     Preventative Management  BP regimen (ACE/ARB) - no  ASA - yes  Statin - lipitor   Last Retinal Scan - done   Monofilament/foot exam -BKA, sees podiatrist regularly.    Pt has home glucometer and proper testing technique - yes  Pt reports blood sugars:   Before Breakfast: <150  Before Lunch:   Before Dinner: <110  Before Bedtime:   Other times:    Hypoglycemia awareness - yes  Nocturnal hypoglycemia- yes  Hypoglycemia:  Occasional    Physical Examination:  Vital signs: There were no vitals taken for this visit. There is no height or weight on file to calculate BMI.    Change in weight: Stable  Exercise habits: minimal exercise   Diet: DASH diet    Assessment and Plan:  1. DM2   • A1C above goal   • Jardiance caused hypotension, but may consider it at subsequent appointments if elevations in microalbumin to creatinine ratio continue  • Subsequent appointments optimize Trulicity  • Subsequent appointments consider optimizing metformin  Medication(s) recommended:   · stop glipizide   · Continue metformin 500mg twice daily   · Start Trulicity 0.75mg every 7 days.      -Blood glucose and A1c target    • However, more aggressive diabetic control is reasonable when tolerated.  • Pt's treatment of Hypoglycemia. 15:15 Rule discussed with patient      2.  Cardiovascular  • ldl are at goal   Medication(s) recommended.   • Continue with Lipitor 80mg daily       3.  Lifestyle changes   · Focus on eating a DASH/Mediterranean-style diet.   · Exercise 30 minutes daily at least 5 days/week, as tolerated.  · https://www.niddk.nih.gov/bwp          Follow-up appointment in 5 week(s)    Jevon Barnard, PharmD, MS, BCACP, LCC  [facility]  Phone [phone] fax [phone]    This note was created using voice recognition software (Dragon). The accuracy of the dictation is limited by the abilities of the software. I have reviewed the note prior to signing, however some errors in grammar and context are still possible. If you have any questions related to this note please do not hesitate to contact our office.     CC:   Donny Mcnally M.D.  No ref. provider found  Dr. Jude Dallas

## 2022-08-10 ENCOUNTER — APPOINTMENT (OUTPATIENT)
Dept: PHYSICAL THERAPY | Facility: REHABILITATION | Age: 57
End: 2022-08-10
Attending: NURSE PRACTITIONER
Payer: COMMERCIAL

## 2022-08-10 ENCOUNTER — TELEPHONE (OUTPATIENT)
Dept: VASCULAR LAB | Facility: MEDICAL CENTER | Age: 57
End: 2022-08-10
Payer: COMMERCIAL

## 2022-08-10 DIAGNOSIS — E11.69 TYPE 2 DIABETES MELLITUS WITH OTHER SPECIFIED COMPLICATION, WITHOUT LONG-TERM CURRENT USE OF INSULIN (HCC): ICD-10-CM

## 2022-08-10 DIAGNOSIS — I63.9 RIGHT SIDED CEREBRAL HEMISPHERE CEREBROVASCULAR ACCIDENT (CVA) (HCC): ICD-10-CM

## 2022-08-10 RX ORDER — GLIPIZIDE 5 MG/1
2.5 TABLET ORAL
Qty: 60 TABLET | Refills: 0 | Status: SHIPPED | OUTPATIENT
Start: 2022-08-10 | End: 2022-09-12

## 2022-08-10 NOTE — TELEPHONE ENCOUNTER
Received call from pt that his BG has been >170 since stopping glipizide.     Will restart glipizide .  F/u appt in Sept. Consider increasing Trulicity and stopping glipizide.    Nani Archuleta, PharmD

## 2022-08-24 ENCOUNTER — TELEPHONE (OUTPATIENT)
Dept: OCCUPATIONAL THERAPY | Facility: REHABILITATION | Age: 57
End: 2022-08-24
Payer: COMMERCIAL

## 2022-08-24 NOTE — OP THERAPY DISCHARGE SUMMARY
Outpatient Occupational Therapy  DISCHARGE SUMMARY NOTE    [facility] Occupational Therapy [address].  [address]  [place] [address]  Phone:  [phone]  Fax:  [phone]    Date of Visit: 08/24/2022    Patient: Manan Aviles  YOB: 1965  MRN: [id number]     Referring Provider: BANDAR Shahid [place] [address] [address]   Referring Diagnosis: Late effect of stroke [I69.30             Your patient is being discharged from Occupational Therapy with the following comments:   Patient was actively participating in therapy; however was put on hold for other medical procedures and last seen on 7/18/22 and still on hold.  His authorization period expires in 2 days so OT will discharge from service at this time.    Limitations Remaining:  unknown    Recommendations:  Continue with HEP and happy to see again if needed  Thank you for the referral.     Mallory Hicks OTR/L    Date: 8/24/2022

## 2022-08-25 ENCOUNTER — APPOINTMENT (OUTPATIENT)
Dept: PHYSICAL THERAPY | Facility: REHABILITATION | Age: 57
End: 2022-08-25
Attending: NURSE PRACTITIONER
Payer: COMMERCIAL

## 2022-08-29 ENCOUNTER — APPOINTMENT (OUTPATIENT)
Dept: PHYSICAL THERAPY | Facility: REHABILITATION | Age: 57
End: 2022-08-29
Attending: NURSE PRACTITIONER
Payer: COMMERCIAL

## 2022-08-31 ENCOUNTER — APPOINTMENT (OUTPATIENT)
Dept: PHYSICAL THERAPY | Facility: REHABILITATION | Age: 57
End: 2022-08-31
Attending: NURSE PRACTITIONER
Payer: COMMERCIAL

## 2022-08-31 NOTE — OP THERAPY DAILY TREATMENT
Outpatient Physical Therapy  DAILY TREATMENT     [facility] Physical [address].  [address]  [place] [address]  Phone:  [phone]  Fax:  [phone]    Date: 09/01/2022    Patient: Manan Aviles  YOB: 1965  MRN: [id number]     Time Calculation    Start time: 0830  Stop time: 0911 Time Calculation (min): 41 minutes         Chief Complaint: Difficulty Walking, Weakness, and Loss Of Balance    Visit #: 21 11/12 approved visits  SUBJECTIVE:  Patient has not been seen since 7/21 due to pt developing blister and unable to ambulate, per MD orders, x 2 weeks. Since then pts return to activity at home has been slow. Pt has not been compliant with HEP as he was unsure about reps but does state he is still in possession of handout. Has only been doing 10. Pt also states he still feels his leg is weak which is limiting his ability to walk without AD. Pt reports he did not understand that HEP would help his weakness. States walks in the house without AD consistently. Some attempts made to ambulate on uneven terrain without AD but unable to confirm how often or how long.       OBJECTIVE:      Unable to perform STS standard chair without UE  5times STS 24.95 standard chair BUE  Timed Up and Go (TUG): 26.50 seconds with SBQC  TUG Without AD: 26.49    10MWT without AD 20.08 sec no LOB  Gait speed:.49m/s    6MWT no  feet. 40 second standing rest at 3:40    Therapeutic Exercises (CPT 65821):     1. 5STS,    2. Standing hip ext, x 15    3. standing hip abd, x 15    20. 6/14-8/9      Therapeutic Exercise Summary:     2 therEx, 2 gait  Auth approved   Auth ID: [id number]  06/24/2022-09/21/2022  12 visits     HEP: 3 x 15 each supine SLR, sidelying hip abd, prone hiup ext, standing hip ext, standing hip abd. Purposeful walking inside without AD 5 min 2-3x per day.     Therapeutic Treatments and Modalities:     1. Gait Training (CPT 90879)    Therapeutic Treatment and Modalities  Summary: 6MWT, TUG, 10MWT  Time-based treatments/modalities:    Physical Therapy Timed Treatment Charges  Gait training minutes (CPT 42709): 11 minutes  Therapeutic exercise minutes (CPT 35536): 30 minutes      ASSESSMENT:   Response to treatment: Patient has completed 21 visits since SOC on 4/5/2022 related to weakness, impaired balance, and gait mechanics secondary to CVA with left sided impairments further complicated by left BKA. Since SOC pt has demonstrated significant improvements including use of wc for mobility and currently ambulating consistently in the household without AD and community with SBQC. AS well as overall functional LE improvements noted in 5STS and TUG. Further assessed CV endurance and gait speed outcome measures during todays assessment. Pt demonstrates similar gait mechanics and speed with and without AD as supported by similar TUG scores. 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 He will benefit from skilled PT 2x per week for 6-8 weeks, 12 visits, to  continue to dec fall risk and return to PLOF.  Time spent reviewing HEP including written definitions of sets/reps and expectations of patient progressions. Instructions to ambulate without AD purposefully in the home 5 in at a time 2-3x per day as able to safely determined by fatigue.     Short Term Goals:   1. Pt no longer using w/c for mobility.- Met  3. Pt demo more symmetrical gait pattern for safety and minimized fall risk.- PARTIALLY MET, CONTINUE.  4. Pt able to walk 400 feet with QC/SPC Zac in home and in community.- Met  Short term goal time span:  2-4 weeks      Long Term Goals:    1. Pt able to perform sit <-> stand from standard chair without UE support.- NOT MET, CONTINUE.  2. Pt able to walk in home without AD.- PARTIALLY MET,  CONTINUE.  3. Pt will demo increased function via improved scores on TUG. - MET, CONTINUE.    PLAN/RECOMMENDATIONS:   Plan for treatment: therapy treatment to continue next visit.  Planned interventions for next visit: continue with current treatment.

## 2022-08-31 NOTE — OP THERAPY PROGRESS SUMMARY
Outpatient Physical Therapy  PROGRESS SUMMARY NOTE      [facility] Physical Therapy [place] [address] [address]  [address]  [place] [address]  Phone:  [phone]  Fax:  [phone]    Date of Visit: 09/01/2022    Patient: Manan Aviles  YOB: 1965  MRN: [id number]     Referring Provider: Donny Mcnally M.D.  [address] [place] [address]   Referring Diagnosis Unspecified sequelae of cerebral infarction [I69.30]     Visit Diagnoses     ICD-10-CM   1. 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 Since SOC pt has demonstrated significant improvements including use of wc for mobility and currently ambulating consistently in the household without AD and community with SBQC. AS well as overall functional LE improvements noted in 5STS and TUG. Further assessed CV endurance and gait speed outcome measures during todays assessment. Pt demonstrates similar gait mechanics and speed with and without AD as supported by similar TUG scores. 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 He will benefit from skilled PT 2x per week for 6-8 weeks, 12 visits, to  continue to dec fall risk and return to PLOF.  Time spent reviewing HEP including written definitions of sets/reps and expectations of patient progressions. Instructions to ambulate without AD purposefully in the home 5 in at a time 2-3x per day as able to safely determined by fatigue.     Short Term Goals:    1. Pt no longer using w/c for mobility.- Met  3. Pt demo more symmetrical gait pattern for safety and minimized fall risk.- PARTIALLY MET, CONTINUE.  4. Pt able to walk 400 feet with QC/SPC Zac in home and in community.- Met  Short term goal time span:  2-4 weeks      Long Term Goals:    1. Pt able to perform sit <-> stand from standard chair without UE support.- NOT MET, CONTINUE.  2. Pt able to walk in home without AD.- Met  3. Pt will demo increased function via improved scores on TUG. - MET, CONTINUE.    Objective findings and assessment details: Unable to perform STS standard chair without UE  5times STS 24.95 standard chair BUE  Timed Up and Go (TUG): 26.50 seconds with SBQC  TUG Without AD: 26.49     10MWT without AD 20.08 sec no LOB  Gait speed:.49m/s     6MWT no  feet. 40 second standing rest at 3:40    Goals:   Short Term Goals:   1.  Pt demo more symmetrical gait pattern for safety and minimized fall risk  2. Pt will be compliant with HEP 3- 5x week in order to improve strength, endurance, and stabilty.  3. Pt will ambulate with SPV on grass/uneven surfaces x 200 feet without LOB  4. Pt will demonstrate improved LE strength with ability to perform STS without UE.  Short term goal time span:  2-4 weeks      Long Term Goals:    1. Pt will demonstrate improved functional LE strength with STS score 15 sec or better with BUE support.  2. Pt will demonstrate improved functional mobility with TUG score without AD 17 seconds or better.  3. Pt will ambulate at self selected gait speed .8m/s or better to dec fall risk.  4. Pt will demonstrate improved CV endurance with 6MWT score 750 feet or better without AD or rest break.   5. Pt will amabulate without AD mod I on uneven and level surfaces in order to return to PLOF.   Long term goal time span:  6-8 weeks    Plan:   Planned therapy interventions:  Gait Training (CPT 85957), Therapeutic Exercise (CPT 15887) and Neuromuscular Re-education (CPT  02913)  Frequency:  2x week  Duration in weeks:  8  Duration in visits:  12  Plan details:  97110 x 1 unit, 97116 x 1 unit, 97112 x 2 units    Referring provider co-signature:  I have reviewed this plan of care and my co-signature certifies the need for services.     Certification Period: 09/01/2022 to 10/27/22    Physician Signature: ________________________________ Date: ______________

## 2022-09-01 ENCOUNTER — PHYSICAL THERAPY (OUTPATIENT)
Dept: PHYSICAL THERAPY | Facility: REHABILITATION | Age: 57
End: 2022-09-01
Attending: NURSE PRACTITIONER
Payer: COMMERCIAL

## 2022-09-01 DIAGNOSIS — I69.30 LATE EFFECT OF STROKE: ICD-10-CM

## 2022-09-01 PROCEDURE — 97110 THERAPEUTIC EXERCISES: CPT

## 2022-09-01 PROCEDURE — 97116 GAIT TRAINING THERAPY: CPT

## 2022-09-07 ENCOUNTER — PHYSICAL THERAPY (OUTPATIENT)
Dept: PHYSICAL THERAPY | Facility: REHABILITATION | Age: 57
End: 2022-09-07
Attending: NURSE PRACTITIONER
Payer: COMMERCIAL

## 2022-09-07 DIAGNOSIS — I69.30 LATE EFFECT OF STROKE: ICD-10-CM

## 2022-09-07 PROCEDURE — 97110 THERAPEUTIC EXERCISES: CPT

## 2022-09-07 PROCEDURE — 97116 GAIT TRAINING THERAPY: CPT

## 2022-09-07 NOTE — OP THERAPY DAILY TREATMENT
Outpatient Physical Therapy  DAILY TREATMENT     [facility] Physical Therapy [address]  [address]  [place] [address]  Phone:  [phone]  Fax:  [phone]    Date: 09/07/2022    Patient: Manan Aviles  YOB: 1965  MRN: [id number]     Time Calculation    Start time: 0915  Stop time: 0956 Time Calculation (min): 41 minutes         Chief Complaint: Weakness and Difficulty Walking    Visit #: 22    12/12 visit 22 auth ending 9/21  SUBJECTIVE:  Pt reports completed 2 x 10 each exercise at home. Some knee pain at end of session.    OBJECTIVE:      Unable to perform STS standard chair without UE  5times STS 24.95 standard chair BUE  Timed Up and Go (TUG): 26.50 seconds with SBQC  TUG Without AD: 26.49    10MWT without AD 20.08 sec no LOB  Gait speed:.49m/s    6MWT no  feet. 40 second standing rest at 3:40    Therapeutic Exercises (CPT 57546):     1. Knee ext stretch c OP, 3 x 1 min, Rt heel on bolster    2. SLR, 2 x 12, max knee ext cues    4. prone hip ext, 3 x 10, max knee ext cues    5. sidelying hip abd, 3 x 10, mod cues dec hip flexion and knee ext    20. 6/14-8/9      Therapeutic Exercise Summary:     2 therEx, 2 gait  Auth approved   Auth ID: [id number]  06/24/2022-09/21/2022  12 visits     HEP: 3 x 15 each supine SLR, sidelying hip abd, prone hip ext, standing hip ext, standing hip abd. Purposeful walking inside without AD 5 min 2-3x per day.     Therapeutic Treatments and Modalities:     1. Gait Training (CPT 95570)    Therapeutic Treatment and Modalities Summary: For improving endurance and stabilty without AD  Time-based treatments/modalities:    Physical Therapy Timed Treatment Charges  Gait training minutes (CPT 89264): 10 minutes  Therapeutic exercise minutes (CPT 60783): 31 minutes      ASSESSMENT:   Response to treatment:Pt required frequent redirection to task while performing strengthening interventions. Education on importance of improving quad strength and  achieving full knee extension for SLR as well as need for gait as he is currently missing 8-10 deg. Encouraged stretching c OP at home. Pt required frequent cues for HEP and education related to strengthening with appropriate reps/sets.     PLAN/RECOMMENDATIONS:   Plan for treatment: therapy treatment to continue next visit.  Planned interventions for next visit: continue with current treatment.

## 2022-09-09 ENCOUNTER — APPOINTMENT (OUTPATIENT)
Dept: PHYSICAL THERAPY | Facility: REHABILITATION | Age: 57
End: 2022-09-09
Attending: NURSE PRACTITIONER
Payer: COMMERCIAL

## 2022-09-12 ENCOUNTER — NON-PROVIDER VISIT (OUTPATIENT)
Dept: MEDICAL GROUP | Facility: PHYSICIAN GROUP | Age: 57
End: 2022-09-12
Payer: COMMERCIAL

## 2022-09-12 DIAGNOSIS — E78.2 MIXED HYPERLIPIDEMIA: ICD-10-CM

## 2022-09-12 PROCEDURE — 99401 PREV MED CNSL INDIV APPRX 15: CPT | Performed by: FAMILY MEDICINE

## 2022-09-12 RX ORDER — ATORVASTATIN CALCIUM 80 MG/1
80 TABLET, FILM COATED ORAL DAILY
Qty: 90 TABLET | Refills: 1 | Status: SHIPPED | OUTPATIENT
Start: 2022-09-12 | End: 2022-12-11

## 2022-09-12 RX ORDER — DULAGLUTIDE 1.5 MG/.5ML
0.5 INJECTION, SOLUTION SUBCUTANEOUS
Qty: 2.24 ML | Refills: 3 | Status: SHIPPED | OUTPATIENT
Start: 2022-09-12 | End: 2022-11-08

## 2022-09-12 RX ORDER — DULAGLUTIDE 1.5 MG/.5ML
INJECTION, SOLUTION SUBCUTANEOUS
COMMUNITY
Start: 2022-08-31 | End: 2022-09-12 | Stop reason: SDUPTHER

## 2022-09-12 NOTE — OP THERAPY DAILY TREATMENT
Outpatient Physical Therapy  DAILY TREATMENT     [facility] Physical Therapy [address]  [address]  [place] [address]  Phone:  [phone]  Fax:  [phone]    Date: 09/13/2022    Patient: Manan Aviles  YOB: 1965  MRN: [id number]     Time Calculation    Start time: 0830  Stop time: 0911 Time Calculation (min): 41 minutes         Chief Complaint: Weakness and Loss Of Balance    Visit #: 23      SUBJECTIVE:  Pt reports some HEP compliance. Reports when he walks his knee gives out sometimes as well as feeling like limping.    OBJECTIVE:      Unable to perform STS standard chair without UE  5times STS 24.95 standard chair BUE  Timed Up and Go (TUG): 26.50 seconds with SBQC  TUG Without AD: 26.49    10MWT without AD 20.08 sec no LOB  Gait speed:.49m/s    6MWT no  feet. 40 second standing rest at 3:40    Therapeutic Exercises (CPT 44519):     1. standing hip ext, 2 x 10 B mod cues dec trunk flex, BUE //    2. standing hip abd, 2 x 10 B    20. 6/14-8/9      Therapeutic Exercise Summary: Approved   Auth ID [id number]  09/07/22-12/05/22  12 visits   1 therEx, 1 gait, 2 NMR    HEP: 3 x 15 each supine SLR, sidelying hip abd, prone hip ext, standing hip ext, standing hip abd. Purposeful walking inside without AD 5 min 2-3x per day.     Therapeutic Treatments and Modalities:     1. Neuromuscular Re-education (CPT 03859)    Therapeutic Treatment and Modalities Summary: Modified SLS 4 inch step  X 2 min each bilat CGA. Max cues dec hip w/b and recall to task as well as to maximize weight bearing in posterior LE  Vertical head turns, horizontal head turns, head circles CW/CCW, EC, EC   Sig fatigue requiring standing and seated rests frequently.   Time-based treatments/modalities:    Physical Therapy Timed Treatment Charges  Neuromusc re-ed, balance, coor, post minutes (CPT 65229): 26 minutes  Therapeutic exercise minutes (CPT 40586): 15 minutes      ASSESSMENT:   Response to  "treatment:  Continued to heavily emphasize HEP. Educated pt that his complaints of knee buckling and \"limping\" which is trendelenburg gait is likely due to his non compliance with HEP since SOC with limited to no performance of strengthening exercises at home. At this phase in POC told pt he will need to be responsible and perform HEP in order to maximize strength. No knee buckling or trendelenburg gait during session. Does require cues to perform reciprocal pattern in which he replied he does that (step to) because hs is tired.       PLAN/RECOMMENDATIONS:   Plan for treatment: therapy treatment to continue next visit.  Planned interventions for next visit: continue with current treatment.       "

## 2022-09-12 NOTE — PROGRESS NOTES
Patient Consult Note     TIME IN: 11am  TIME OUT: 1122   Time with Pt 22    Primary care physician: Donny Mcnally M.D.    Reason for consult: Management of Uncontrolled Type 2 Diabetes    HPI:  Israel Aviles is a 56 y.o. old patient who comes in today for evaluation of above stated problem.    Allergies:  Patient has no known allergies.    Most Recent labs, A1c, and immunizations:    Lab Results   Component Value Date/Time    HBA1C 7.7 (A) 06/16/2022 09:45 AM          Lab Results   Component Value Date/Time    CHOLSTRLTOT 137 02/17/2022 09:46 AM    LDL 65 02/17/2022 09:46 AM    HDL 43 02/17/2022 09:46 AM    TRIGLYCERIDE 143 02/17/2022 09:46 AM       Lab Results   Component Value Date/Time    SODIUM 138 03/02/2022 06:04 AM    POTASSIUM 4.1 03/02/2022 06:04 AM    CHLORIDE 101 03/02/2022 06:04 AM    CO2 26 03/02/2022 06:04 AM    GLUCOSE 134 (H) 03/02/2022 06:04 AM    BUN 18 03/02/2022 06:04 AM    CREATININE 0.74 03/02/2022 06:04 AM    CREATININE 0.9 05/09/2007 01:20 AM    BUNCREATRAT 14 11/10/2021 06:52 AM     Lab Results   Component Value Date/Time    ALKPHOSPHAT 92 02/25/2022 05:53 AM    ASTSGOT 31 02/25/2022 05:53 AM    ALTSGPT 30 02/25/2022 05:53 AM    TBILIRUBIN 0.3 02/25/2022 05:53 AM    INR 1.05 02/17/2022 09:46 AM    ALBUMIN 3.9 02/25/2022 05:53 AM      Lab Results   Component Value Date/Time    MALBCRT 40 (H) 03/04/2015 09:00 AM    MICROALBUR 4.7 03/04/2015 09:00 AM     Immunization History   Administered Date(s) Administered    Hep A/HEP B Combined Vaccine ([employer]) 08/06/2021    Hepatitis B Vaccine (Adol/Adult) 06/09/2008    Influenza (IM) Preservative Free - HISTORICAL DATA 10/15/2010    Influenza Vac Subunit Quad Inj (Pf) 12/11/2021    Influenza Vaccine H1N1 - HISTORICAL DATA 02/27/2010    MMR Vaccine 06/09/2008    MODERNA SARS-COV-2 VACCINE (12+) 01/04/2022    PFIZER PURPLE CAP SARS-COV-2 VACCINATION (12+) 04/07/2021, 04/30/2021    TD Vaccine 06/09/2008    Varicella Vaccine Live 06/09/2008          Medications:    Current Outpatient Medications:     Trulicity, 0.5 mL, Subcutaneous, Q7 DAYS    polyethylene glycol/lytes, 17 g, Oral, DAILY    gabapentin, 100 mg, Oral, QHS PRN    metFORMIN, 500 mg, Oral, BID WITH MEALS    atorvastatin, 80 mg, Oral, DAILY    Lumigan, INSTILL 1 DROP INTO EACH EYE AT BEDTIME    clopidogrel, 75 mg, Oral, DAILY    metoprolol SR, 50 mg, Oral, BID (Patient taking differently: 50 mg, Oral, 2 TIMES DAILY, Other, Indications: takes 1/2 in am 1/2 pm)    vitamin D3, 2,000 Units, Oral, DAILY    multivitamin, 1 Tablet, Oral, DAILY    aspirin, 81 mg, Oral, DAILY    Diabetes Medication Current Regimen:  At the last appt:  stop glipizide   Continue metformin 500mg twice daily   Increased to  Trulicity 1.5mg every 7 days, per endo.      History/ Failed Diabetic Medications if applicable:  jardiance low BP     Preventative Management  BP regimen (ACE/ARB) - no  ASA - yes  Statin - lipitor   Last Retinal Scan - done   Monofilament/foot exam -BKA, sees podiatrist regularly.    Pt has home glucometer and proper testing technique - yes  Pt reports blood sugars:   Before Breakfast: <150  Before Lunch:   Before Dinner: <110  Before Bedtime:   Other times:    Hypoglycemia awareness - yes  Nocturnal hypoglycemia- yes  Hypoglycemia:  Occasional    Physical Examination:  Vital signs: There were no vitals taken for this visit. There is no height or weight on file to calculate BMI.    Change in weight: Stable  Exercise habits: minimal exercise   Diet: DASH diet    Assessment and Plan:  1. DM2   A1C above goal, due for labs   Jardiance caused hypotension, but may consider it at subsequent appointments if elevations in microalbumin to creatinine ratio continue  Subsequent appointments optimize Trulicity  Subsequent appointments consider optimizing metformin  Medication(s) recommended:   Continue metformin 500mg twice daily   Continue Trulicity 1.5 mg every 7 days.     -Blood glucose and A1c  target    However, more aggressive diabetic control is reasonable when tolerated.  Pt's treatment of Hypoglycemia. 15:15 Rule discussed with patient      2.  Cardiovascular  ldl are at goal   Medication(s) recommended.   Continue with Lipitor 80mg daily       3.  Lifestyle changes   Focus on eating a DASH/Mediterranean-style diet.   Exercise 30 minutes daily at least 5 days/week, as tolerated.  https://www.niddk.nih.gov/bwp          Follow-up appointment in 5 week(s)    Jevon Barnard, PharmD, MS, BCACP, C  [facility]  Phone [phone] fax [phone]    This note was created using voice recognition software (Dragon). The accuracy of the dictation is limited by the abilities of the software. I have reviewed the note prior to signing, however some errors in grammar and context are still possible. If you have any questions related to this note please do not hesitate to contact our office.     CC:   Donny Mcnally M.D.  No ref. provider found  Dr. Jude Dallas

## 2022-09-13 ENCOUNTER — PHYSICAL THERAPY (OUTPATIENT)
Dept: PHYSICAL THERAPY | Facility: REHABILITATION | Age: 57
End: 2022-09-13
Attending: NURSE PRACTITIONER
Payer: COMMERCIAL

## 2022-09-13 DIAGNOSIS — I69.30 LATE EFFECT OF STROKE: ICD-10-CM

## 2022-09-13 PROCEDURE — 97112 NEUROMUSCULAR REEDUCATION: CPT

## 2022-09-13 PROCEDURE — 97110 THERAPEUTIC EXERCISES: CPT

## 2022-09-19 ENCOUNTER — OFFICE VISIT (OUTPATIENT)
Dept: CARDIOLOGY | Facility: MEDICAL CENTER | Age: 57
End: 2022-09-19
Payer: COMMERCIAL

## 2022-09-19 VITALS
OXYGEN SATURATION: 98 % | BODY MASS INDEX: 22.75 KG/M2 | RESPIRATION RATE: 16 BRPM | HEIGHT: 72 IN | DIASTOLIC BLOOD PRESSURE: 68 MMHG | WEIGHT: 168 LBS | SYSTOLIC BLOOD PRESSURE: 102 MMHG | HEART RATE: 97 BPM

## 2022-09-19 DIAGNOSIS — I50.20 HFREF (HEART FAILURE WITH REDUCED EJECTION FRACTION) (HCC): ICD-10-CM

## 2022-09-19 DIAGNOSIS — I25.83 CORONARY ARTERY DISEASE DUE TO LIPID RICH PLAQUE: ICD-10-CM

## 2022-09-19 DIAGNOSIS — I73.9 PVD (PERIPHERAL VASCULAR DISEASE) (HCC): ICD-10-CM

## 2022-09-19 DIAGNOSIS — I25.5 ISCHEMIC CARDIOMYOPATHY: ICD-10-CM

## 2022-09-19 DIAGNOSIS — I25.10 CORONARY ARTERY DISEASE DUE TO LIPID RICH PLAQUE: ICD-10-CM

## 2022-09-19 PROCEDURE — 99215 OFFICE O/P EST HI 40 MIN: CPT | Performed by: INTERNAL MEDICINE

## 2022-09-19 ASSESSMENT — ENCOUNTER SYMPTOMS
DIARRHEA: 0
NEAR-SYNCOPE: 0
WEIGHT GAIN: 0
IRREGULAR HEARTBEAT: 0
CLAUDICATION: 0
NAUSEA: 0
BLURRED VISION: 0
DEPRESSION: 0
ALTERED MENTAL STATUS: 0
DECREASED APPETITE: 0
FEVER: 0
COUGH: 0
PND: 0
BACK PAIN: 0
SHORTNESS OF BREATH: 0
FLANK PAIN: 0
HEARTBURN: 0
ORTHOPNEA: 0
DIZZINESS: 0
SYNCOPE: 0
PALPITATIONS: 0
DYSPNEA ON EXERTION: 0
WEIGHT LOSS: 0
CONSTIPATION: 0
VOMITING: 0
ABDOMINAL PAIN: 0

## 2022-09-19 ASSESSMENT — FIBROSIS 4 INDEX: FIB4 SCORE: 1.93

## 2022-09-19 NOTE — PROGRESS NOTES
Cardiology Note    Heart failure    History of Present Illness: Israel Aviles is a 56 year old man PMH CVA, DM2, HTN, PAD s/p angioplasty c/b osteomyelitis s/p LLE BKA 12/20/19; incidentally found new HF 30-35% now recovered and obstructive multivessel CAD presents for follow up.    Doing well this visit. Denies cardiac complaints. Denies heart failure symptoms. Compliant with medications and denies adverse effects. Taking metoprolol succinate 25mg twice daily. His vitals tolerate and states home SBP typically 100s. Able to ambulate around the house using prosthetic leg and cane. Previously seen inpatient by Dr Mendoza 12/2019 recommended no coronary surgical intervention however this was at time of acute lower extremity gangrene.      Review of Systems   Constitutional: Negative for decreased appetite, fever, malaise/fatigue, weight gain and weight loss.   HENT:  Negative for congestion and nosebleeds.    Eyes:  Negative for blurred vision.   Cardiovascular:  Negative for chest pain, claudication, dyspnea on exertion, irregular heartbeat, leg swelling, near-syncope, orthopnea, palpitations, paroxysmal nocturnal dyspnea and syncope.   Respiratory:  Negative for cough and shortness of breath.    Endocrine: Negative for cold intolerance and heat intolerance.   Skin:  Negative for rash.   Musculoskeletal:  Negative for back pain.   Gastrointestinal:  Negative for abdominal pain, constipation, diarrhea, heartburn, melena, nausea and vomiting.   Genitourinary:  Negative for dysuria, flank pain and hematuria.   Neurological:  Negative for dizziness.   Psychiatric/Behavioral:  Negative for altered mental status and depression.        Past Medical History:   Diagnosis Date    CAD (coronary artery disease)     Diabetes (HCC)     Hyperlipidemia     Hypertension          Past Surgical History:   Procedure Laterality Date    KNEE AMPUTATION BELOW Left 12/20/2019    Procedure: AMPUTATION, BELOW KNEE;  Surgeon: Koby KELLEY  SREEDHAR Zhao;  Location: SURGERY [facility];  Service: Orthopedics    ZZZ CARDIAC CATH  12/16/2019    multi-vessel disease    IRRIGATION & DEBRIDEMENT ORTHO Left 6/24/2019    Procedure: IRRIGATION AND DEBRIDEMENT, WOUND-FOOT, BIOLOGIC PLACEMENT, WOUND VAC PLACEMENT;  Surgeon: Charles Chopra M.D.;  Location: SURGERY [facility];  Service: Orthopedics         Current Outpatient Medications   Medication Sig Dispense Refill    atorvastatin (LIPITOR) 80 MG tablet Take 1 Tablet by mouth every day for 90 days. 90 Tablet 1    TRULICITY 1.5 MG/0.5ML Solution Pen-injector Inject 0.5 mL under the skin every 7 days. 2.24 mL 3    gabapentin (NEURONTIN) 100 MG Cap Take 1 Capsule by mouth at bedtime as needed (pain/insomnia). 30 Capsule 11    metFORMIN (GLUCOPHAGE) 500 MG Tab Take 1 Tablet by mouth 2 times a day with meals. 180 Tablet 3    LUMIGAN 0.01 % Solution INSTILL 1 DROP INTO EACH EYE AT BEDTIME      clopidogrel (PLAVIX) 75 MG Tab Take 1 Tablet by mouth every day. 30 Tablet 2    vitamin D3 2000 UNIT Tab Take 1 Tablet by mouth every day. 60 Tablet     multivitamin (THERAGRAN) Tab Take 1 Tablet by mouth every day.      aspirin 81 MG EC tablet Take 1 tablet by mouth every day. 30 tablet 2    polyethylene glycol/lytes (MIRALAX) 17 g Pack Take 1 Packet by mouth every day. With 8 oz of water at night if needed. (Patient not taking: Reported on 9/19/2022) 1 Each 0    metoprolol SR (TOPROL XL) 25 MG TABLET SR 24 HR Take 2 Tablets by mouth 2 times a day. (Patient taking differently: Take 50 mg by mouth 2 times a day. Indications: takes 1/2 in am 1/2 pm) 120 Tablet 2     No current facility-administered medications for this visit.         No Known Allergies      Family History   Problem Relation Age of Onset    Diabetes Mother     Diabetes Father          Social History     Socioeconomic History    Marital status:      Spouse name: Not on file    Number of children: Not on file    Years of education: Not on file     Highest education level: Not on file   Occupational History    Not on file   Tobacco Use    Smoking status: Never    Smokeless tobacco: Never   Vaping Use    Vaping Use: Never used   Substance and Sexual Activity    Alcohol use: No    Drug use: No    Sexual activity: Not on file   Other Topics Concern    Not on file   Social History Narrative    Not on file     Social Determinants of Health     Financial Resource Strain: Not on file   Food Insecurity: Not on file   Transportation Needs: Not on file   Physical Activity: Not on file   Stress: Not on file   Social Connections: Not on file   Intimate Partner Violence: Not on file   Housing Stability: Not on file         Physical Exam:  Ambulatory Vitals  /68 (BP Location: Left arm, Patient Position: Sitting, BP Cuff Size: Adult)   Pulse 97   Resp 16   Ht 1.829 m (6')   Wt 76.2 kg (168 lb)   SpO2 98%    BP Readings from Last 4 Encounters:   09/19/22 102/68   06/29/22 104/60   06/22/22 110/70   06/16/22 105/60     Weight/BMI:   Vitals:    09/19/22 0907   BP: 102/68   Weight: 76.2 kg (168 lb)   Height: 1.829 m (6')    Body mass index is 22.78 kg/m².  Wt Readings from Last 4 Encounters:   09/19/22 76.2 kg (168 lb)   06/22/22 77.6 kg (171 lb 1.2 oz)   06/16/22 77.1 kg (170 lb)   04/20/22 75 kg (165 lb 5.5 oz)     Cardiology Note    Heart failure    History of Present Illness: Israel Aviles is a 55 year old man PMH DM2, HTN, PAD s/p angioplasty c/b osteomyelitis s/p LLE BKA 12/20/19; incidentally found new HF 30-35% now recovered and obstructive multivessel CAD presents for follow up.    Doing well this visit. No active cardiac complaints; no heart failure symptoms. Did not tolerate zetia. Felt light headed. This has improved after stopping medication. Compliant with remaining medications and denies adverse effects.     Review of Systems   Constitutional: Negative for decreased appetite, fever, malaise/fatigue, weight gain and weight loss.   HENT: Negative  for congestion and nosebleeds.    Eyes: Negative for blurred vision.   Cardiovascular: Negative for chest pain, claudication, dyspnea on exertion, irregular heartbeat, leg swelling, near-syncope, orthopnea, palpitations, paroxysmal nocturnal dyspnea and syncope.   Respiratory: Negative for cough and shortness of breath.    Endocrine: Negative for cold intolerance and heat intolerance.   Skin: Negative for rash.   Musculoskeletal: Negative for back pain.   Gastrointestinal: Negative for abdominal pain, constipation, diarrhea, heartburn, melena, nausea and vomiting.   Genitourinary: Negative for dysuria, flank pain and hematuria.   Neurological: Negative for dizziness.   Psychiatric/Behavioral: Negative for altered mental status and depression.         Past Medical History:   Diagnosis Date    CAD (coronary artery disease)     Diabetes (HCC)     Hyperlipidemia     Hypertension          Past Surgical History:   Procedure Laterality Date    KNEE AMPUTATION BELOW Left 12/20/2019    Procedure: AMPUTATION, BELOW KNEE;  Surgeon: Koby Zhao M.D.;  Location: SURGERY [facility];  Service: Orthopedics    [facility] CARDIAC CATH  12/16/2019    multi-vessel disease    IRRIGATION & DEBRIDEMENT ORTHO Left 6/24/2019    Procedure: IRRIGATION AND DEBRIDEMENT, WOUND-FOOT, BIOLOGIC PLACEMENT, WOUND VAC PLACEMENT;  Surgeon: Charles Chopra M.D.;  Location: SURGERY [facility];  Service: Orthopedics         Current Outpatient Medications   Medication Sig Dispense Refill    atorvastatin (LIPITOR) 80 MG tablet Take 1 Tablet by mouth every day for 90 days. 90 Tablet 1    TRULICITY 1.5 MG/0.5ML Solution Pen-injector Inject 0.5 mL under the skin every 7 days. 2.24 mL 3    gabapentin (NEURONTIN) 100 MG Cap Take 1 Capsule by mouth at bedtime as needed (pain/insomnia). 30 Capsule 11    metFORMIN (GLUCOPHAGE) 500 MG Tab Take 1 Tablet by mouth 2 times a day with meals. 180 Tablet 3    LUMIGAN 0.01 % Solution INSTILL 1 DROP INTO EACH EYE AT  BEDTIME      clopidogrel (PLAVIX) 75 MG Tab Take 1 Tablet by mouth every day. 30 Tablet 2    vitamin D3 2000 UNIT Tab Take 1 Tablet by mouth every day. 60 Tablet     multivitamin (THERAGRAN) Tab Take 1 Tablet by mouth every day.      aspirin 81 MG EC tablet Take 1 tablet by mouth every day. 30 tablet 2    polyethylene glycol/lytes (MIRALAX) 17 g Pack Take 1 Packet by mouth every day. With 8 oz of water at night if needed. (Patient not taking: Reported on 9/19/2022) 1 Each 0    metoprolol SR (TOPROL XL) 25 MG TABLET SR 24 HR Take 2 Tablets by mouth 2 times a day. (Patient taking differently: Take 50 mg by mouth 2 times a day. Indications: takes 1/2 in am 1/2 pm) 120 Tablet 2     No current facility-administered medications for this visit.         No Known Allergies      Family History   Problem Relation Age of Onset    Diabetes Mother     Diabetes Father          Social History     Socioeconomic History    Marital status:      Spouse name: Not on file    Number of children: Not on file    Years of education: Not on file    Highest education level: Not on file   Occupational History    Not on file   Tobacco Use    Smoking status: Never    Smokeless tobacco: Never   Vaping Use    Vaping Use: Never used   Substance and Sexual Activity    Alcohol use: No    Drug use: No    Sexual activity: Not on file   Other Topics Concern    Not on file   Social History Narrative    Not on file     Social Determinants of Health     Financial Resource Strain: Not on file   Food Insecurity: Not on file   Transportation Needs: Not on file   Physical Activity: Not on file   Stress: Not on file   Social Connections: Not on file   Intimate Partner Violence: Not on file   Housing Stability: Not on file         Physical Exam:  Ambulatory Vitals  /68 (BP Location: Left arm, Patient Position: Sitting, BP Cuff Size: Adult)   Pulse 97   Resp 16   Ht 1.829 m (6')   Wt 76.2 kg (168 lb)   SpO2 98%    BP Readings from Last 4  Encounters:   09/19/22 102/68   06/29/22 104/60   06/22/22 110/70   06/16/22 105/60     Weight/BMI:   Vitals:    09/19/22 0907   BP: 102/68   Weight: 76.2 kg (168 lb)   Height: 1.829 m (6')    Body mass index is 22.78 kg/m².  Wt Readings from Last 4 Encounters:   09/19/22 76.2 kg (168 lb)   06/22/22 77.6 kg (171 lb 1.2 oz)   06/16/22 77.1 kg (170 lb)   04/20/22 75 kg (165 lb 5.5 oz)       Physical Exam  Constitutional:       General: He is not in acute distress.  HENT:      Head: Normocephalic and atraumatic.   Eyes:      Conjunctiva/sclera: Conjunctivae normal.      Pupils: Pupils are equal, round, and reactive to light.   Neck:      Vascular: No JVD.   Cardiovascular:      Rate and Rhythm: Normal rate and regular rhythm.      Heart sounds: Normal heart sounds. No murmur heard.   No friction rub. No gallop.    Pulmonary:      Effort: Pulmonary effort is normal. No respiratory distress.      Breath sounds: Normal breath sounds. No wheezing or rales.   Chest:      Chest wall: No tenderness.   Abdominal:      General: Bowel sounds are normal. There is no distension.      Palpations: Abdomen is soft.   Musculoskeletal:      Cervical back: Normal range of motion and neck supple.   Skin:     General: Skin is warm and dry.   Neurological:      Mental Status: He is alert and oriented to person, place, and time.   Psychiatric:         Mood and Affect: Affect normal.         Judgment: Judgment normal.         Lab Data Review:  Lab Results   Component Value Date/Time    CHOLSTRLTOT 137 02/17/2022 09:46 AM    LDL 65 02/17/2022 09:46 AM    HDL 43 02/17/2022 09:46 AM    TRIGLYCERIDE 143 02/17/2022 09:46 AM       Lab Results   Component Value Date/Time    SODIUM 138 03/02/2022 06:04 AM    POTASSIUM 4.1 03/02/2022 06:04 AM    CHLORIDE 101 03/02/2022 06:04 AM    CO2 26 03/02/2022 06:04 AM    GLUCOSE 134 (H) 03/02/2022 06:04 AM    BUN 18 03/02/2022 06:04 AM    CREATININE 0.74 03/02/2022 06:04 AM    CREATININE 0.9 05/09/2007 01:20 AM     BUNCREATRAT 14 11/10/2021 06:52 AM     CrCl cannot be calculated (Patient's most recent lab result is older than the maximum 7 days allowed.).  Lab Results   Component Value Date/Time    ALKPHOSPHAT 92 02/25/2022 05:53 AM    ASTSGOT 31 02/25/2022 05:53 AM    ALTSGPT 30 02/25/2022 05:53 AM    TBILIRUBIN 0.3 02/25/2022 05:53 AM      Lab Results   Component Value Date/Time    WBC 5.1 03/02/2022 06:04 AM     Lab Results   Component Value Date/Time    HBA1C 7.7 (A) 06/16/2022 09:45 AM     No components found for: TROP    Outside a1c 6.3 noted by Dr Mcnally 10/16/20    Cardiac Imaging and Procedures Review:      EKG 7/2021 sinus rhythm 83 bpm    zio monitor 8/2021  Procedure: zio monitor 13 days 21 hours   Indication: TIA   Quality: good   Findings:   Underlying rhythm: Predominantly sinus rhythm; average rate 82 bpm. No atrial fibrillation nor flutter detected.   Atrial events: Rare ectopy. 3 episodes of supraventricular tachycardia (SVT); fastest 174 bpm which was also longest at 9 beats.   Ventricular events: Rare ectopy.   Patient events: Patient triggered symptoms associated with sinus rhythm. Asymptomatic, short SVT episodes.   Impressions:   Predominantly sinus rhythm.   Patient triggered symptoms associated with sinus rhythm.   Asymptomatic, short SVT episodes.   Rare ectopy.     TTE 1/11/2021  CONCLUSIONS  Normal left ventricular size, wall thickness, and systolic function.  Normal right ventricular size and systolic function.  No significant valvular pathology.  Normal pericardium without effusion.     Compared to the images of the prior study done 12/18/19, left   ventricular systolic function has recovered.    Cardiac MRI 7/6/20  1. Diffuse subendocardial late gadolinium enhancement involving the apex, apical septal, apical inferior, apical lateral, apical anterior, in keeping with prior infarct.     The apex is thinning with more than 75% enhancement, likely transmural infarct with no viability.      The other apical septal, apical inferior, apical lateral, apical anterior segments have 40-60% late gadolinium enhancement of the myocardial thickness.     2. Akinesis of the apical septum and apical segments. Hypokinesis of the apical anterior segment. Grossly preserved contraction in the apical lateral and apical inferior segments.     Ejection fraction of the left ventricle is 46 %, similar to prior.     3. No thrombus in the left ventricular apex.    TTE 12/18/2019  CONCLUSIONS  Limited Exam for LV Apical Thrombus.   No thrombus. Mildly reduced left ventricular systolic function.  Left ventricular ejection fraction is visually estimated to be 45%.  There is apical septum and apical akinesis.    TTE 12/16/19  CONCLUSIONS  Severely reduced left ventricular systolic function. Left ventricular   ejection fraction is visually estimated to be 30-35%.   There appears to be a linear echodensity which is adjacent to the LV   apex concerning for possible small LV thrombus. May consider repeat   limted echo in 1-2 days for re-evaluation if clinically indicated.    Normal inferior vena cava size and inspiratory collapse.  Indeterminate diastolic function.  Aortic sclerosis without stenosis.     [facility] 12/2019  Coronary Diagnostic Findings    * Left main: Distal tapering with 50% stenosis.    * Left anterior descending: Diffuse atherosclerosis and negative remodeling  beginning at the origin.  There are sequential 70% stenosis in the proximal  and mid segment of the vessel.  There is subtotal occlusion in the mid to  distal segment with AGATHA II flow into the apical LAD. The first diagonal has  proximal 70% stenosis and is diffusely diseased and negatively remodeled with  95% stenosis in the lower branch.    * Left circumflex: Mid vessel 70-80% stenosis.  The OM1 has diffuse  atherosclerosis there is less than 1.5 mm in size and without significant  narrowing.  The OM 2 bifurcates proximally the upper branch has proximal  50%  stenosis and is a less than 1.5 mm vessel.  The lower branch is a 2.25 mm  vessel and has proximal 85% stenosis.    * Right coronary artery: Dominant, diffuse 60% stenosis in the proximal mid  and distal AV groove.  There is a dual PDA.  The first PDA has a mid 80%  stenosis and diffuse disease downstream.  The second PDA has proximal 80%  stenosis.  Conclusions    1. Severe LV systolic dysfunction by noninvasive evaluation.    2. Severe and diffuse obstructive three-vessel coronary artery disease.    3. Normal LVEDP.    Medical Decision Making:  Problem List Items Addressed This Visit       CAD (coronary artery disease)    Relevant Orders    NM-CARDIAC PET    HFrEF (heart failure with reduced ejection fraction) ([facility])    Ischemic cardiomyopathy    Relevant Orders    NM-CARDIAC PET    PVD (peripheral vascular disease) ([facility])    Relevant Orders    Referral to Vascular Surgery   ICM, HFrecEF, NYHA I, stage C - recovered systolic function; may have had a component of stress cardiomyopathy which improved with osteomyelitis. Continue current medications. Cannot tolerate Ace/arb due to hypotension.    Severe CAD/PAD/HLD - previously on aspirin 81mg and xarelto 2.5mg bid; due to cost now on DAPT. Continue statin. Add zetia. Repeat lipids just prior to next visit. Goal LDL <70 and trig <150. Consider pcsk9 if remains above threshold. Rediscuss revascularization if becomes symptomatic or LVEF worsens.    Dm2 - please consider SGLT2i +/- GLP1a. Has follow up with endocrinology.     It was my pleasure to meet with Mr. Aviles.                        Physical Exam  Constitutional:       General: He is not in acute distress.  HENT:      Head: Normocephalic and atraumatic.   Eyes:      Conjunctiva/sclera: Conjunctivae normal.      Pupils: Pupils are equal, round, and reactive to light.   Neck:      Vascular: No JVD.   Cardiovascular:      Rate and Rhythm: Normal rate and regular rhythm.      Heart sounds: Normal heart sounds. No  murmur heard.    No friction rub. No gallop.   Pulmonary:      Effort: Pulmonary effort is normal. No respiratory distress.      Breath sounds: Normal breath sounds. No wheezing or rales.   Chest:      Chest wall: No tenderness.   Abdominal:      General: Bowel sounds are normal. There is no distension.      Palpations: Abdomen is soft.   Musculoskeletal:      Cervical back: Normal range of motion and neck supple.   Skin:     General: Skin is warm and dry.   Neurological:      Mental Status: He is alert and oriented to person, place, and time.   Psychiatric:         Mood and Affect: Affect normal.         Judgment: Judgment normal.       Lab Data Review:  Lab Results   Component Value Date/Time    CHOLSTRLTOT 137 02/17/2022 09:46 AM    LDL 65 02/17/2022 09:46 AM    HDL 43 02/17/2022 09:46 AM    TRIGLYCERIDE 143 02/17/2022 09:46 AM       Lab Results   Component Value Date/Time    SODIUM 138 03/02/2022 06:04 AM    POTASSIUM 4.1 03/02/2022 06:04 AM    CHLORIDE 101 03/02/2022 06:04 AM    CO2 26 03/02/2022 06:04 AM    GLUCOSE 134 (H) 03/02/2022 06:04 AM    BUN 18 03/02/2022 06:04 AM    CREATININE 0.74 03/02/2022 06:04 AM    CREATININE 0.9 05/09/2007 01:20 AM    BUNCREATRAT 14 11/10/2021 06:52 AM     CrCl cannot be calculated (Patient's most recent lab result is older than the maximum 7 days allowed.).  Lab Results   Component Value Date/Time    ALKPHOSPHAT 92 02/25/2022 05:53 AM    ASTSGOT 31 02/25/2022 05:53 AM    ALTSGPT 30 02/25/2022 05:53 AM    TBILIRUBIN 0.3 02/25/2022 05:53 AM      Lab Results   Component Value Date/Time    WBC 5.1 03/02/2022 06:04 AM     Lab Results   Component Value Date/Time    HBA1C 7.7 (A) 06/16/2022 09:45 AM     No components found for: TROP    Outside a1c 6.3 noted by Dr Mcnally 10/16/20    Cardiac Imaging and Procedures Review:      EKG 7/2021 sinus rhythm 83 bpm    zio monitor 8/2021  Procedure: zio monitor 13 days 21 hours   Indication: TIA   Quality: good   Findings:   Underlying  rhythm: Predominantly sinus rhythm; average rate 82 bpm. No atrial fibrillation nor flutter detected.   Atrial events: Rare ectopy. 3 episodes of supraventricular tachycardia (SVT); fastest 174 bpm which was also longest at 9 beats.   Ventricular events: Rare ectopy.   Patient events: Patient triggered symptoms associated with sinus rhythm. Asymptomatic, short SVT episodes.   Impressions:   Predominantly sinus rhythm.   Patient triggered symptoms associated with sinus rhythm.   Asymptomatic, short SVT episodes.   Rare ectopy.     TTE 1/11/2021  CONCLUSIONS  Normal left ventricular size, wall thickness, and systolic function.  Normal right ventricular size and systolic function.  No significant valvular pathology.  Normal pericardium without effusion.     Compared to the images of the prior study done 12/18/19, left   ventricular systolic function has recovered.    Cardiac MRI 7/6/20  1. Diffuse subendocardial late gadolinium enhancement involving the apex, apical septal, apical inferior, apical lateral, apical anterior, in keeping with prior infarct.     The apex is thinning with more than 75% enhancement, likely transmural infarct with no viability.     The other apical septal, apical inferior, apical lateral, apical anterior segments have 40-60% late gadolinium enhancement of the myocardial thickness.     2. Akinesis of the apical septum and apical segments. Hypokinesis of the apical anterior segment. Grossly preserved contraction in the apical lateral and apical inferior segments.     Ejection fraction of the left ventricle is 46 %, similar to prior.     3. No thrombus in the left ventricular apex.    TTE 12/18/2019  CONCLUSIONS  Limited Exam for LV Apical Thrombus.   No thrombus. Mildly reduced left ventricular systolic function.  Left ventricular ejection fraction is visually estimated to be 45%.  There is apical septum and apical akinesis.    TTE 12/16/19  CONCLUSIONS  Severely reduced left ventricular  systolic function. Left ventricular   ejection fraction is visually estimated to be 30-35%.   There appears to be a linear echodensity which is adjacent to the LV   apex concerning for possible small LV thrombus. May consider repeat   limted echo in 1-2 days for re-evaluation if clinically indicated.    Normal inferior vena cava size and inspiratory collapse.  Indeterminate diastolic function.  Aortic sclerosis without stenosis.     [facility] 12/2019  Coronary Diagnostic Findings    * Left main: Distal tapering with 50% stenosis.    * Left anterior descending: Diffuse atherosclerosis and negative remodeling  beginning at the origin.  There are sequential 70% stenosis in the proximal  and mid segment of the vessel.  There is subtotal occlusion in the mid to  distal segment with AGATHA II flow into the apical LAD. The first diagonal has  proximal 70% stenosis and is diffusely diseased and negatively remodeled with  95% stenosis in the lower branch.    * Left circumflex: Mid vessel 70-80% stenosis.  The OM1 has diffuse  atherosclerosis there is less than 1.5 mm in size and without significant  narrowing.  The OM 2 bifurcates proximally the upper branch has proximal 50%  stenosis and is a less than 1.5 mm vessel.  The lower branch is a 2.25 mm  vessel and has proximal 85% stenosis.    * Right coronary artery: Dominant, diffuse 60% stenosis in the proximal mid  and distal AV groove.  There is a dual PDA.  The first PDA has a mid 80%  stenosis and diffuse disease downstream.  The second PDA has proximal 80%  stenosis.  Conclusions    1. Severe LV systolic dysfunction by noninvasive evaluation.    2. Severe and diffuse obstructive three-vessel coronary artery disease.    3. Normal LVEDP.    Medical Decision Making:  Problem List Items Addressed This Visit       CAD (coronary artery disease)    Relevant Orders    NM-CARDIAC PET    HFrEF (heart failure with reduced ejection fraction) (HCC)    Ischemic cardiomyopathy    Relevant  Orders    NM-CARDIAC PET    PVD (peripheral vascular disease) ([facility])    Relevant Orders    Referral to Vascular Surgery     ICM, HFrecEF, NYHA I, stage C - recovered systolic function; may have had a component of stress cardiomyopathy which improved with osteomyelitis resolution. Continue metoprolol succinate. Cannot tolerate Ace/arb due to hypotension. Check functional stress for ischemia. With his LM obstruction and diabetic status concerned should reconsider revascularization with CTS.     Severe CAD/PAD/HLD - previously on aspirin 81mg and xarelto 2.5mg bid; due to cost issues now on DAPT. Continue statin. Annual lipids. Goal LDL <70 and trig <150. Annual lipids. Refer to vascular surgery to assess preoperative risk to extremities if plan for cabg with cardiopulm bypass.    It was my pleasure to meet with  Traci.    A total of 48 minutes of time was spent on day of encounter reviewing medical record, performing history and examination, counseling, ordering medication/test/consults and documentation.

## 2022-09-21 NOTE — OP THERAPY DAILY TREATMENT
Outpatient Physical Therapy  DAILY TREATMENT     [facility] Physical Therapy [address].  [address]  [place] [address]  Phone:  [phone]  Fax:  [phone]    Date: 09/22/2022    Patient: Manan Aviles  YOB: 1965  MRN: [id number]     Time Calculation    Start time: 0830  Stop time: 0911 Time Calculation (min): 41 minutes         Chief Complaint: Weakness, Difficulty Walking, and Loss Of Balance    Visit #: 24      SUBJECTIVE:  States compliance with HEP. Continues to state walking frequently at home but does not state any adherence to instructions for improving endurance at home with ambulation. States he is afraid to walk without SPV but had previously stated walking in the home without SPC frequently.     OBJECTIVE:      Unable to perform STS standard chair without UE  5times STS 24.95 standard chair BUE  Timed Up and Go (TUG): 26.50 seconds with SBQC  TUG Without AD: 26.49    10MWT without AD 20.08 sec no LOB  Gait speed:.49m/s    6MWT no  feet. 40 second standing rest at 3:40    Therapeutic Exercises (CPT 49661):     1. circuit training see below    20. 9/1-10/27      Therapeutic Exercise Summary:         Approved   Auth ID [id number]  09/07/22-12/05/22  12 visits   1 therEx, 1 gait, 2 NMR    HEP: 3 x 15 each supine SLR, sidelying hip abd, prone hip ext, standing hip ext, standing hip abd. Purposeful walking inside without AD 5 min 2-3x per day.     Therapeutic Treatments and Modalities:     1. Gait Training (CPT 22546), circuit training see below    2. Neuromuscular Re-education (CPT 07874)    Therapeutic Treatment and Modalities Summary: Circuit completed x 3  Timed bue 5# Db carry x 6 min, no AD  2 x 5 squats CGA. Cues for hip hinge to dec posterior LOB.  X 60 sec narrrow GILLIAN EC CGA/mod A   3 min seated rest    First round standing rest at 3 and 4 min less then 20 sec. Dec Rt step length when turning corners and complaints of pt eeling that he is limping. However  only 2-3 steps with noted inc trendelenburg.Denied pain or discomfort in LE socket.   2nd round brief standing rest 2 min, 3 min. 1 self corrected minor LOB at 2 min    3rd round 20 sec standing break at 440. No LOB    No response to therapists cues to increase Rt step length for more reciprocal gait nor to therapist cues to look up away from the floor.  Time-based treatments/modalities:    Physical Therapy Timed Treatment Charges  Gait training minutes (CPT 50618): 18 minutes  Neuromusc re-ed, balance, coor, post minutes (CPT 38759): 10 minutes  Therapeutic exercise minutes (CPT 17984): 13 minutes      ASSESSMENT:   Response to treatment:Continued to encourage timed ambulation at home as he is still unable to ambulate more than 3 min at a time without standing break. This has previously been instructed with pt not following through as improvement hs not occurred. Discussed barriers to complaince and encouraged pt ambulate near kitchen counter to miprove confidence when walking without AD.         PLAN/RECOMMENDATIONS:   Plan for treatment: therapy treatment to continue next visit.  Planned interventions for next visit: continue with current treatment.

## 2022-09-22 ENCOUNTER — PHYSICAL THERAPY (OUTPATIENT)
Dept: PHYSICAL THERAPY | Facility: REHABILITATION | Age: 57
End: 2022-09-22
Attending: NURSE PRACTITIONER
Payer: COMMERCIAL

## 2022-09-22 DIAGNOSIS — I69.30 LATE EFFECT OF STROKE: ICD-10-CM

## 2022-09-22 PROCEDURE — 97110 THERAPEUTIC EXERCISES: CPT

## 2022-09-22 PROCEDURE — 97112 NEUROMUSCULAR REEDUCATION: CPT

## 2022-09-22 PROCEDURE — 97116 GAIT TRAINING THERAPY: CPT

## 2022-09-26 ENCOUNTER — OFFICE VISIT (OUTPATIENT)
Dept: PHYSICAL MEDICINE AND REHAB | Facility: MEDICAL CENTER | Age: 57
End: 2022-09-26
Payer: COMMERCIAL

## 2022-09-26 VITALS
RESPIRATION RATE: 15 BRPM | SYSTOLIC BLOOD PRESSURE: 98 MMHG | OXYGEN SATURATION: 100 % | TEMPERATURE: 98.1 F | BODY MASS INDEX: 22.69 KG/M2 | HEIGHT: 72 IN | WEIGHT: 167.55 LBS | DIASTOLIC BLOOD PRESSURE: 60 MMHG | HEART RATE: 89 BPM

## 2022-09-26 DIAGNOSIS — G81.94 LEFT HEMIPARESIS (HCC): ICD-10-CM

## 2022-09-26 DIAGNOSIS — Z99.89 WALKER AS AMBULATION AID: ICD-10-CM

## 2022-09-26 DIAGNOSIS — I69.391 DYSPHAGIA AS LATE EFFECT OF CEREBROVASCULAR ACCIDENT (CVA): ICD-10-CM

## 2022-09-26 DIAGNOSIS — Z86.73 HISTORY OF STROKE: Primary | ICD-10-CM

## 2022-09-26 DIAGNOSIS — Z89.512 HX OF BKA, LEFT (HCC): ICD-10-CM

## 2022-09-26 PROCEDURE — 99213 OFFICE O/P EST LOW 20 MIN: CPT | Performed by: PHYSICAL MEDICINE & REHABILITATION

## 2022-09-26 RX ORDER — GLIPIZIDE 2.5 MG/1
2.5 TABLET, EXTENDED RELEASE ORAL DAILY
COMMUNITY
End: 2022-10-05 | Stop reason: SDUPTHER

## 2022-09-26 RX ORDER — AMOXICILLIN 500 MG/1
CAPSULE ORAL
COMMUNITY
Start: 2022-09-19 | End: 2023-02-28

## 2022-09-26 ASSESSMENT — FIBROSIS 4 INDEX: FIB4 SCORE: 1.93

## 2022-09-26 NOTE — PROGRESS NOTES
[facility]  PM&R Neuro Rehabilitation Clinic  [facility] [place] [address]  Ph: [phone]    FOLLOW UP PATIENT EVALUATION      Patient Name: Israel Aviles   Patient : 1965  Patient Age: 56 y.o.     Examining Physician: Dr. Lacy Cheng, DO    PM&R History to Date - Background Information:  Dates of Admission/Discharge to ARU: 2022-3/12/2022    SUBJECTIVE:   Patient Identification: Israel Aviles is a 56 y.o. male with PMH significant for hypertension, hyperlipidemia type 2 diabetes, coronary artery disease, history of left frontal stroke in 2018, Left BKA in 2019 and rehabilitation history significant for right ischemic CVA 2022 and is presenting to PM&R clinic for a FOLLOW UP OUTPATIENT evaluation with the following chief complaint/s:    Chief Complaint: Amputee and stroke follow up    Accompanied by Today: Wife, Terrance   History of Present Illness:   - Established with OP [facility] therapy.  - He has 6 PT sessions left and is going 1x per week.   - He is coughing when he drinks. Sometimes it scares.   - Has not fallen since we last met.   - Still without any pain.   - He is not taking Gabapentin consistently at night. Only as needed once in a while.   - Has tightness in his calf on both sides.   - Has progressed to using the quad cane but is still unsteady.   - Passed driving evaluation.   - Still not working.   - Had severe constipation as a side effect of Trulicity. His BP also was low.     Review of Systems:  All other pertinent positive review of systems are noted above in HPI.   All other systems reviewed and are negative.    Past Medical History:  Past Medical History:   Diagnosis Date    CAD (coronary artery disease)     Diabetes (HCC)     Hyperlipidemia     Hypertension       Past Surgical History:   Procedure Laterality Date    KNEE AMPUTATION BELOW Left 2019    Procedure: AMPUTATION, BELOW KNEE;  Surgeon: Koby Zhao M.D.;  Location: SURGERY  [facility];  Service: Orthopedics    Z CARDIAC CATH  12/16/2019    multi-vessel disease    IRRIGATION & DEBRIDEMENT ORTHO Left 6/24/2019    Procedure: IRRIGATION AND DEBRIDEMENT, WOUND-FOOT, BIOLOGIC PLACEMENT, WOUND VAC PLACEMENT;  Surgeon: Charles Chopra M.D.;  Location: SURGERY [facility];  Service: Orthopedics        Current Outpatient Medications:     glipiZIDE SR (GLUCOTROL) 2.5 MG TABLET SR 24 HR, Take 2.5 mg by mouth every day., Disp: , Rfl:     amoxicillin (AMOXIL) 500 MG Cap, , Disp: , Rfl:     atorvastatin (LIPITOR) 80 MG tablet, Take 1 Tablet by mouth every day for 90 days., Disp: 90 Tablet, Rfl: 1    gabapentin (NEURONTIN) 100 MG Cap, Take 1 Capsule by mouth at bedtime as needed (pain/insomnia)., Disp: 30 Capsule, Rfl: 11    metFORMIN (GLUCOPHAGE) 500 MG Tab, Take 1 Tablet by mouth 2 times a day with meals., Disp: 180 Tablet, Rfl: 3    LUMIGAN 0.01 % Solution, INSTILL 1 DROP INTO EACH EYE AT BEDTIME, Disp: , Rfl:     clopidogrel (PLAVIX) 75 MG Tab, Take 1 Tablet by mouth every day., Disp: 30 Tablet, Rfl: 2    metoprolol SR (TOPROL XL) 25 MG TABLET SR 24 HR, Take 2 Tablets by mouth 2 times a day. (Patient taking differently: Take 50 mg by mouth 2 times a day. Indications: takes 1/2 in am 1/2 pm), Disp: 120 Tablet, Rfl: 2    vitamin D3 2000 UNIT Tab, Take 1 Tablet by mouth every day., Disp: 60 Tablet, Rfl:     multivitamin (THERAGRAN) Tab, Take 1 Tablet by mouth every day., Disp: , Rfl:     aspirin 81 MG EC tablet, Take 1 tablet by mouth every day., Disp: 30 tablet, Rfl: 2    TRULICITY 1.5 MG/0.5ML Solution Pen-injector, Inject 0.5 mL under the skin every 7 days. (Patient not taking: Reported on 9/26/2022), Disp: 2.24 mL, Rfl: 3  No Known Allergies     Past Social History:  Social History     Socioeconomic History    Marital status:      Spouse name: Not on file    Number of children: Not on file    Years of education: Not on file    Highest education level: Not on file   Occupational  History    Not on file   Tobacco Use    Smoking status: Never    Smokeless tobacco: Never   Vaping Use    Vaping Use: Never used   Substance and Sexual Activity    Alcohol use: No    Drug use: No    Sexual activity: Not on file   Other Topics Concern    Not on file   Social History Narrative    Not on file     Social Determinants of Health     Financial Resource Strain: Not on file   Food Insecurity: Not on file   Transportation Needs: Not on file   Physical Activity: Not on file   Stress: Not on file   Social Connections: Not on file   Intimate Partner Violence: Not on file   Housing Stability: Not on file        Family History:  Family History   Problem Relation Age of Onset    Diabetes Mother     Diabetes Father        Depression and Opioid Screening  PHQ-9:  Depression Screen (PHQ-2/PHQ-9) 3/8/2022 3/11/2022 4/20/2022   PHQ-2 Total Score 0 0 -   PHQ-2 Total Score - - 3   PHQ-9 Total Score - - 8     Interpretation of PHQ-9 Total Score   Score Severity   1-4 No Depression   5-9 Mild Depression   10-14 Moderate Depression   15-19 Moderately Severe Depression   20-27 Severe Depression     OBJECTIVE:   Vital Signs:  Vitals:    09/26/22 0937   BP: (!) 98/60   Pulse: 89   Resp: 15   Temp: 36.7 °C (98.1 °F)   SpO2: 100%        Pertinent Labs:  Lab Results   Component Value Date/Time    SODIUM 138 03/02/2022 06:04 AM    POTASSIUM 4.1 03/02/2022 06:04 AM    CHLORIDE 101 03/02/2022 06:04 AM    CO2 26 03/02/2022 06:04 AM    GLUCOSE 134 (H) 03/02/2022 06:04 AM    BUN 18 03/02/2022 06:04 AM    CREATININE 0.74 03/02/2022 06:04 AM    CREATININE 0.9 05/09/2007 01:20 AM    BUNCREATRAT 14 11/10/2021 06:52 AM       Lab Results   Component Value Date/Time    HBA1C 7.7 (A) 06/16/2022 09:45 AM       Lab Results   Component Value Date/Time    WBC 5.1 03/02/2022 06:04 AM    RBC 4.20 (L) 03/02/2022 06:04 AM    HEMOGLOBIN 12.9 (L) 03/02/2022 06:04 AM    HEMATOCRIT 38.4 (L) 03/02/2022 06:04 AM    MCV 91.4 03/02/2022 06:04 AM    MCH 30.7  03/02/2022 06:04 AM    MCHC 33.6 (L) 03/02/2022 06:04 AM    MPV 10.5 03/02/2022 06:04 AM    NEUTSPOLYS 46.70 02/25/2022 05:53 AM    LYMPHOCYTES 42.70 (H) 02/25/2022 05:53 AM    MONOCYTES 8.20 02/25/2022 05:53 AM    EOSINOPHILS 1.60 02/25/2022 05:53 AM    BASOPHILS 0.40 02/25/2022 05:53 AM    ANISOCYTOSIS 1+ 12/20/2019 03:00 AM       Lab Results   Component Value Date/Time    ASTSGOT 31 02/25/2022 05:53 AM    ALTSGPT 30 02/25/2022 05:53 AM        Physical Exam:   GEN: No apparent distress  HEENT: Head normocephalic, atraumatic.  Sclera nonicteric bilaterally, no ocular discharge appreciated bilaterally.  CV: Extremities warm and well-perfused, no peripheral edema appreciated bilaterally.  PULMONARY: Breathing nonlabored on room air, no respiratory accessory muscle use.  Not requiring supplemental oxygen.  SKIN: No appreciable skin breakdown on exposed areas of skin.  PSYCH: Mood and affect within normal limits.  NEURO: Awake alert.  Conversational.  Logical thought content.    Motor Exam Upper Extremities   ? Myotome R L   Shoulder Abduction C5 5 5   Elbow flexion C5 5 5   Wrist extension C6 5 5   Elbow extension C7 5 4   Finger flexion C8 5 4   Finger abduction T1 5 4     Motor Exam Lower Extremities  ? Myotome R L   Hip flexion L2 5 4   Knee extension L3 5 5   Ankle dorsiflexion L4 5 5   Toe extension L5 5 5   Ankle plantarflexion S1 5 5     Ambulatory with quad cane, left prosthetic limb.      ASSESSMENT/PLAN: Israel Aviles  is a 56 y.o. male with rehabilitation history significant for right ischemic CVA 2/17/2022, here for evaluation. The following plan was discussed with the patient who is in agreement.     Visit Diagnoses     ICD-10-CM   1. History of stroke  Z86.73   2. Dysphagia as late effect of cerebrovascular accident (CVA)  I69.391   3. Left hemiparesis (HCC)  G81.94   4. Hx of BKA, left (HCC)  Z89.512   5. Walker as ambulation aid  Z99.89        Promila, spouse, assists with  history.    Rehab/Neuro/Ortho:   Right ischemic CVA 2/17/2022  History of left BKA 12/2019 Dr. Zhao  History of left-sided ischemic CVA 6/2018  -Therapy: Continue outpatient [facility]  - Driving status: Not currently driving but reportedly did pass the driving evaluation with occupational therapy  - Counseled on safe return to driving  - Referral: Speech therapy to evaluate swallow as patient is reporting coughing on thin liquids.  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  - Function: Improved in terms of no longer using walker, he is using quad cane however his balance is still not sufficient to return to work in any capacity which requires him to physically multitask  - We will place referral for additional physical therapy if indicated.    Assessment of Current Functional Status: 4/20/2022 ambulatory with walker, left BKA prosthetic limb.  Weakness on the left side at a moderate level. 6/29/2022 more ambulatory than he had been prior.  Not using a wheelchair often.  Able to stand and walk with walker but difficult to multitask using walker. 9/26/2022 as progressed to using a quad cane.      Neuropathic pain/difficulty sleeping:  - Med management: Occasional difficulty sleeping, using gabapentin 100 mg as needed  - Counseled on the indications for using gabapentin.    Mood:  1.  Situational depression  - Denies need for counseling at this time.  His episodes of depression is intermittent but fast passing.    Follow up: 3 months for general reevaluation.    Total time spent was 23 minutes.  Included in this time is the time spent preparing for the visit including record review, my exam and evaluation, counseling and education regarding that which is aforementioned in the assessment and plan.  Time was spent documenting into patient's electronic health record.  Time was spent ordering the appropriate labs, tests, procedures, referrals,  medications.Included this time as the time spent obtaining history from someone other than the patient.  Some of the time included occurred outside of charting time.  Discussion involved the patient and wife.    Please note that this dictation was created using voice recognition software. I have made every reasonable attempt to correct obvious errors but there may be errors of grammar and content that I may have overlooked prior to finalization of this note.    Dr. Lacy Cheng DO, MS  Department of Physical Medicine & Rehabilitation  Neuro Rehabilitation Clinic  [facility]

## 2022-09-30 ENCOUNTER — PHYSICAL THERAPY (OUTPATIENT)
Dept: PHYSICAL THERAPY | Facility: REHABILITATION | Age: 57
End: 2022-09-30
Attending: NURSE PRACTITIONER
Payer: COMMERCIAL

## 2022-09-30 DIAGNOSIS — I69.30 LATE EFFECT OF STROKE: ICD-10-CM

## 2022-09-30 PROCEDURE — 97112 NEUROMUSCULAR REEDUCATION: CPT

## 2022-09-30 PROCEDURE — 97116 GAIT TRAINING THERAPY: CPT

## 2022-09-30 NOTE — OP THERAPY DAILY TREATMENT
Outpatient Physical Therapy  DAILY TREATMENT     [facility] Physical Therapy [address].  [address]  [place] [address]  Phone:  [phone]  Fax:  [phone]    Date: 09/30/2022    Patient: Manan Aviles  YOB: 1965  MRN: [id number]     Time Calculation    Start time: 0845  Stop time: 0930 Time Calculation (min): 45 minutes       Chief Complaint: Difficulty Walking    Visit #: 25    SUBJECTIVE:  Walking 40min at [facility] yesterday, was tired after.     OBJECTIVE:  Current objective measures: Pt demo step to gait pattern with quad cane, L knee flex /hip flex in midstance.         Therapeutic Treatments and Modalities:     1. Neuromuscular Re-education (CPT 02024)    2. Gait Training (CPT 56991)    Therapeutic Treatment and Modalities Summary: 1. Gait trianing no AD: vc's equal step lengths, tc's facilitating L hip ext in stance phase.  Demo'd and tc's L hip ext and inc trunk rotation. 80'x2   2. Neuro re-ed:   Quadruped<>tall kneeling x2 reps with modA.  Mini squat in tall kneeling x 10 reps unsupported.  Quad: bird dogs 5 reps x 2 sets alternating LE's, tc's and vc's for trunk and spinal dissociation/stability from hip motion. Serratus push ups 15reps x 2 sets -reinforced both for HEP.   Tall kneeling pallof press pink tbad: x10 bilaterally  Standing D2 Flex with thor trunk ext in front of mirror 10 reps x 2 sets bilaterally-HEP given pink tband.   Time-based treatments/modalities:      ASSESSMENT:   Response to treatment: Pt demo inc step through gait with inc SLS on L LE and upright head position.  Barriers include compensatory L hip hiking for swing, decrease core activation/tendancy toward inc lordosis and thor ext.     PLAN/RECOMMENDATIONS:   Plan for treatment: therapy treatment to continue next visit.  Planned interventions for next visit: continue with current treatment, gait training (CPT 76043), manual therapy (CPT 73384), neuromuscular re-education (CPT  87753), therapeutic activities (CPT 95460), and therapeutic exercise (CPT 57147).

## 2022-10-04 ENCOUNTER — PHYSICAL THERAPY (OUTPATIENT)
Dept: PHYSICAL THERAPY | Facility: REHABILITATION | Age: 57
End: 2022-10-04
Attending: NURSE PRACTITIONER
Payer: COMMERCIAL

## 2022-10-04 DIAGNOSIS — I69.30 LATE EFFECT OF STROKE: ICD-10-CM

## 2022-10-04 DIAGNOSIS — R26.9 GAIT ABNORMALITY: ICD-10-CM

## 2022-10-04 PROCEDURE — 97112 NEUROMUSCULAR REEDUCATION: CPT

## 2022-10-04 PROCEDURE — 97116 GAIT TRAINING THERAPY: CPT

## 2022-10-04 NOTE — OP THERAPY DAILY TREATMENT
"  Outpatient Physical Therapy  DAILY TREATMENT     [facility] Physical Therapy [address].  [address]  [place] [address]  Phone:  [phone]  Fax:  [phone]    Date: 10/04/2022    Patient: Manan Aviles  YOB: 1965  MRN: [id number]     Time Calculation    Start time: 0845  Stop time: 0930 Time Calculation (min): 45 minutes       Chief Complaint: Difficulty Walking    Visit #: 26    SUBJECTIVE:  [id number] at [employer] yesterday, used the cart, was not tired.  Reports compliance with HEP.     OBJECTIVE:  Current objective measures: Pt inc isaias, inc step through pattern. Cont to decr L LE wegiht acceptance.    Therapeutic Treatments and Modalities:     1. Neuromuscular Re-education (CPT 40311)    2. Gait Training (CPT 57440)    Therapeutic Treatment and Modalities Summary: 1. Gait trianing no AD: vc's equal step lengths, upright posture, tc's facilitating L hip ext in stance phase.  80'x1 pre tx, fwd/retro amb in // bars without using UE support,  lateral side stepping in partial squat x60' ea direction  2. Resisted amb no AD: retro, lateral side stepping, Hari for LOB with side stepping R when resistance given on L hip   3. Neuro re-ed: Push up plus in quadruped 7reps x 1 sets -reinforced for HEP. Quadruped BUE's supported on physio ball<>roll out fwd x 10, fwd/with diagonal reach x 7 ea direction, required 1 seated rest break  4. STS mechanics reviewed and demo for inc active trunk posture, mod facilitation at LB for inc lordosis and ant weight shift.  Unable to stand from low table without UE depression on knees. Trialed form 17\"H chair ,required 1 UE on quad cane.  Moved to high/low table, elevated approx 20\", performed x 10 reps with facilitation and cues for abdominal activation with active trunk posture. Re-attempted from 17\"H chair w/o UE's, facilitation In lordosis for active posture able to stand with Hari for ant weight shift w/o UE's.    Time-based " treatments/modalities:      ASSESSMENT:   Response to treatment:Pt demo poor core activation through STS mechanics without UE support.  Demo improving tolerance to wbing in quadruped and gait unsupported difficulty with spine vs extremity dissociation.     PLAN/RECOMMENDATIONS:   Plan for treatment: Review STS mechanics, assess spinal mobility/dissociation in quadruped, core/TA program for HEPtherapy treatment to continue next visit.  Planned interventions for next visit: continue with current treatment, gait training (CPT 01685), manual therapy (CPT 82831), neuromuscular re-education (CPT 34668), therapeutic activities (CPT 99273), and therapeutic exercise (CPT 67975).

## 2022-10-05 ENCOUNTER — PATIENT MESSAGE (OUTPATIENT)
Dept: MEDICAL GROUP | Facility: OTHER | Age: 57
End: 2022-10-05
Payer: COMMERCIAL

## 2022-10-06 RX ORDER — GLIPIZIDE 2.5 MG/1
2.5 TABLET, EXTENDED RELEASE ORAL DAILY
Qty: 30 TABLET | Refills: 0 | Status: SHIPPED | OUTPATIENT
Start: 2022-10-06 | End: 2022-10-31

## 2022-10-12 ENCOUNTER — PHYSICAL THERAPY (OUTPATIENT)
Dept: PHYSICAL THERAPY | Facility: REHABILITATION | Age: 57
End: 2022-10-12
Attending: NURSE PRACTITIONER
Payer: COMMERCIAL

## 2022-10-12 DIAGNOSIS — R26.9 GAIT ABNORMALITY: ICD-10-CM

## 2022-10-12 PROCEDURE — 97112 NEUROMUSCULAR REEDUCATION: CPT

## 2022-10-12 PROCEDURE — 97110 THERAPEUTIC EXERCISES: CPT

## 2022-10-12 NOTE — OP THERAPY DAILY TREATMENT
"  Outpatient Physical Therapy  DAILY TREATMENT     [facility] Physical Therapy [address].  [address]  [place] [address]  Phone:  [phone]  Fax:  [phone]    Date: 10/12/2022    Patient: Manan Aviles  YOB: 1965  MRN: [id number]     Time Calculation    Start time: 0830  Stop time: 0915 Time Calculation (min): 45 minutes       Chief Complaint: Difficulty Walking and Loss Of Balance    Visit #: 27    SUBJECTIVE:  Compliance with HEP on floor in quadruped.  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  Limited by ability to change directions quickly and stand for long periods of time.     OBJECTIVE:  Current objective measures: Minimal use of quad cane, inc SLS R LE    Therapeutic Treatments and Modalities:     1. Neuromuscular Re-education (CPT 43169)    2. Gait Training (CPT 67897)    Therapeutic Treatment and Modalities Summary: 1. Resisted amb no AD: fwd/retro, lateral side stepping, Hari for LOB x4 with side stepping to R when resistance given on L hip 8 steps x 6 resp ea direction  2. Neuro re-ed: Quadruped <>tall kneeling x 10 reps Hari at pelvis, vc's for inc hip ext activation vs decr UE push off/substitution  3.Core TA edu: TA bracing w/o cerv flex/activation and dissociation from abdominal jaya javier.  TA bracing with cues to breathe/dissociate from diaphragm x 10, TA bracing progressed to heel slides x 10 -HEP & HO  4. STS mechanics reviewed: focus on maintaining active trunk, UE's reaching, unable without UE's from 17\" H chair.  Added airex x 10 reps with CGA only, min/mod cues for maintaining through ROM   Time-based treatments/modalities:      ASSESSMENT:   Demo increased glut strength and activation when performing quad<>tall kneeling today.  Discussed goals toward progressing balance and gait to no AD when in community.    PLAN/RECOMMENDATIONS:   Plan for treatment: Dynamic balance, gait uneven no AD therapy treatment to " continue next visit.  Planned interventions for next visit: continue with current treatment, gait training (CPT 91682), manual therapy (CPT 43941), neuromuscular re-education (CPT 98167), therapeutic activities (CPT 84354), and therapeutic exercise (CPT 23914).

## 2022-10-14 ENCOUNTER — PHYSICAL THERAPY (OUTPATIENT)
Dept: PHYSICAL THERAPY | Facility: REHABILITATION | Age: 57
End: 2022-10-14
Attending: NURSE PRACTITIONER
Payer: COMMERCIAL

## 2022-10-14 DIAGNOSIS — R26.9 GAIT ABNORMALITY: ICD-10-CM

## 2022-10-14 PROCEDURE — 97116 GAIT TRAINING THERAPY: CPT

## 2022-10-14 NOTE — OP THERAPY PROGRESS SUMMARY
Outpatient Physical Therapy  PROGRESS SUMMARY NOTE      [facility] Physical Therapy [place] [address]  [address]  [place] [address]  Phone:  [phone]  Fax:  [phone]    Date of Visit: 10/14/2022    Patient: Manan Aviles  YOB: 1965  MRN: [id number]     Referring Provider: BANDAR Shahidgle [facility] [address]  [place] [address]   Referring Diagnosis Unspecified sequelae of cerebral infarction [I69.30]     Visit Diagnoses     ICD-10-CM   1. Gait abnormality  R26.9       Rehab Potential: good    Progress Report Period: 9/1/2022- 10/14/2022    Functional Assessment Used   Unable to perform STS standard chair without UE  5times STS 10.25 standard chair BUE  Timed Up and Go (TUG): 25.13 seconds with SBQC  TUG Without AD: 21.08     10MWT without AD 12.45 sec no LOB  Gait speed:.49m/s    6MWT no  feet no rest breaks.        Objective Findings and Assessment:   Objective findings and assessment details: Pt demo improvements in all outcome measures today.  Demo difficulty with SLS with turning with no AD, dynamic balance reactions and using poor movement patterns with new activities or unsupported activities. Pt would benefit from skilled PT to address these barriers and meet his goals.    Goals:   Short Term Goals:   Short Term Goals:   1. Pt demo more symmetrical gait pattern for safety and minimized fall risk.- PARTIALLY MET, CONTINUE.  4. Pt able to walk 400 feet no AD Zac in community.- Not Met  Short term goal time span:  2-4 weeks  Short term goal time span:  2-4 weeks      Long Term Goals:    Long Term Goals:    1. Pt able to perform sit <-> stand from standard chair without UE support.- NOT MET, CONTINUE.  2. Pt will walk up/down uneven no AD surfaces Zac  3. Pt will improve mini BEST vs WEISS to inde indep in community without an AD  Long term goal time span:  6-8 weeks    Plan:   Planned therapy interventions:  Gait Training (CPT 59068), Neuromuscular  Re-education (CPT 56894), Therapeutic Activities (CPT 83143) and Therapeutic Exercise (CPT 46319)  Frequency:  2x week  Duration in weeks:  8  Duration in visits:  12    Short Term Goals:   1. Pt no longer using w/c for mobility.- Met  3. Pt demo more symmetrical gait pattern for safety and minimized fall risk.- PARTIALLY MET, CONTINUE.  4. Pt able to walk 400 feet with QC/SPC Zac in home and in community.- Met  Short term goal time span:  2-4 weeks      Long Term Goals:    1. Pt able to perform sit <-> stand from standard chair without UE support.- NOT MET, CONTINUE.  2. Pt able to walk in home without AD.MET  3. Pt will demo increased function via improved scores on TUG. - MET    Referring provider co-signature:  I have reviewed this plan of care and my co-signature certifies the need for services.     Certification Period: 10/14/2022 to 12/13/22    Physician Signature: ________________________________ Date: ______________

## 2022-10-14 NOTE — OP THERAPY DAILY TREATMENT
Outpatient Physical Therapy  DAILY TREATMENT     [facility] Physical Therapy [address]  [address]  [place] [address]  Phone:  [phone]  Fax:  [phone]    Date: 10/14/2022    Patient: Manan Aviles  YOB: 1965  MRN: [id number]     Time Calculation    Start time: 0830  Stop time: 0930 Time Calculation (min): 60 minutes       Chief Complaint: Loss Of Balance and Difficulty Walking    Visit #: 28    SUBJECTIVE:  Inquiring wether to schedule more visits.      OBJECTIVE:  Unable to perform STS standard chair without UE  5times STS 10.25 standard chair BUE  Timed Up and Go (TUG): 25.13 seconds with SBQC  TUG Without AD: 21.08     10MWT without AD 12.45 sec no LOB  Gait speed:.49m/s    6MWT no  feet no rest breaks.       Therapeutic Treatments and Modalities:     1. Gait Training (CPT 21286), see below    Therapeutic Treatment and Modalities Summary: Nu step L5-6 x 13min   Time-based treatments/modalities:    ASSESSMENT:   Pt demo improvements in all outcome measures today.  Demo difficulty with SLS with turning with no AD, dynamic balance reactions and using poor movement patterns with new activities or unsupported activities. Pt would benefit from skilled PT to address these barriers and meet his goals.    Plan: Continue with skilled PT interventions  Address uneven amb with no AD, set up HEP for endurance/strength/balance, assess WEISS vs miniBEST

## 2022-10-18 ENCOUNTER — PHYSICAL THERAPY (OUTPATIENT)
Dept: PHYSICAL THERAPY | Facility: REHABILITATION | Age: 57
End: 2022-10-18
Attending: NURSE PRACTITIONER
Payer: COMMERCIAL

## 2022-10-18 DIAGNOSIS — R26.9 GAIT ABNORMALITY: ICD-10-CM

## 2022-10-18 PROCEDURE — 97112 NEUROMUSCULAR REEDUCATION: CPT

## 2022-10-18 PROCEDURE — 97110 THERAPEUTIC EXERCISES: CPT

## 2022-10-18 NOTE — OP THERAPY DAILY TREATMENT
Outpatient Physical Therapy  DAILY TREATMENT     [facility] Physical Therapy [address]  [address]  [place] [address]  Phone:  [phone]  Fax:  [phone]    Date: 10/18/2022    Patient: Manan Aviles  YOB: 1965  MRN: [id number]     Time Calculation    Start time: 0915  Stop time: 1005 Time Calculation (min): 50 minutes         Chief Complaint: Difficulty Walking and Back Injury    Visit #: 29    SUBJECTIVE:  No complaints or new concerns.      OBJECTIVE:  Current objective measures: Walking into clinic with quad cane, step to , minimal use of cane, decr L LE wbing.        Therapeutic Exercises (CPT 36898):     1. CV endurance, Nu step, L6 x5 BLE's and BUE's    2. LE shuttle      Therapeutic Exercise Summary: BLE strengthening: BLE shuttle: 5 cords  full squat 15 x 3, SL 3 cords x 5 ea.    Therapeutic Treatments and Modalities:     1. Neuromuscular Re-education (CPT 89990)    Therapeutic Treatment and Modalities Summary: Standing on BOSU: weight shifting 10 x 2, mini squats x 10, limited by L LE fatigue.  Step up/down SL , R LE X10, L LE 2 x 10.  Tc's and vc's for initiating L knee & hip ext with wt shift/acceptance, then decreased UE reliance.   Time-based treatments/modalities:    ASSESSMENT:   Response to treatment: Discussed improving overall strength, limitations include fear L LE strength and wbing/stability.    Plan/Recommendations:   Plan for treatment: Dynamic balance, gait uneven no AD therapy treatment to continue next visit.  Planned interventions for next visit: continue with current treatment, gait training (CPT 48494), manual therapy (CPT 75968), neuromuscular re-education (CPT 05413), therapeutic activities (CPT 16248), and therapeutic exercise (CPT 96709).

## 2022-10-19 ENCOUNTER — HOSPITAL ENCOUNTER (OUTPATIENT)
Dept: RADIOLOGY | Facility: MEDICAL CENTER | Age: 57
End: 2022-10-19
Attending: INTERNAL MEDICINE
Payer: COMMERCIAL

## 2022-10-19 DIAGNOSIS — I25.10 CORONARY ARTERY DISEASE DUE TO LIPID RICH PLAQUE: ICD-10-CM

## 2022-10-19 DIAGNOSIS — I25.83 CORONARY ARTERY DISEASE DUE TO LIPID RICH PLAQUE: ICD-10-CM

## 2022-10-19 DIAGNOSIS — I25.5 ISCHEMIC CARDIOMYOPATHY: ICD-10-CM

## 2022-10-21 ENCOUNTER — PHYSICAL THERAPY (OUTPATIENT)
Dept: PHYSICAL THERAPY | Facility: REHABILITATION | Age: 57
End: 2022-10-21
Attending: NURSE PRACTITIONER
Payer: COMMERCIAL

## 2022-10-21 DIAGNOSIS — I69.30 LATE EFFECT OF STROKE: ICD-10-CM

## 2022-10-21 DIAGNOSIS — R26.9 GAIT ABNORMALITY: ICD-10-CM

## 2022-10-21 PROCEDURE — 97110 THERAPEUTIC EXERCISES: CPT

## 2022-10-21 NOTE — OP THERAPY DAILY TREATMENT
Outpatient Physical Therapy  DAILY TREATMENT     [facility] Physical [address]  [address]  [place] [address]  Phone:  [phone]  Fax:  [phone]    Date: 10/21/2022    Patient: Manan Aviles  YOB: 1965  MRN: [id number]     Time Calculation    Start time: 0915  Stop time: 1005 Time Calculation (min): 50 minutes         Chief Complaint: Loss Of Balance and Difficulty Walking    Visit #: 30    SUBJECTIVE:  Reports being sore and tired after last session, primarily quads, a good sore.        OBJECTIVE:  Current objective measures:   Indep sit<>Sup to R side on shuttle, modA for sup>sit toward L side.     Therapeutic Exercises (CPT 21478):     1. LE strengthening    2. Posterior chain strengthening      Therapeutic Exercise Summary: BLE strengthening: BLE shuttle: 5 cords  full squat 15 x 3( trialled 6, unable to perform 5 reps)   SL R LE 3 cords x 10 x 3; L LE 1.5 cords 10 x 3    Posterior chain ex's: prone: hip ext SL raise 10 x 2 cues for decr compensation R hip flex substitution with L hip ext; upper trunk ext with BUE middle trap activation x 10, low trap x 10, cues for chin tuck    Therapeutic Treatments and Modalities:     1. Gait Training (CPT 17614)    Therapeutic Treatment and Modalities Summary: Following post chain and hip ext activation, vc's for equal step lengths, increased arm swing x 120'  Time-based treatments/modalities:  Discussed goal to inc PA at home, reinforced utilizing gym access to perform these strengthening & CV activities while at home.    ASSESSMENT:   Response to treatment: Demo improving L LE strengthening, limited carryover into gait with step to compensatory pattern    Plan/Recommendations:   Plan for treatment: Dynamic balance, gait uneven no AD, thor rotation, thor p/s-IASTM vs cupping, assess hip flex tightness, therapy treatment to continue next visit.    Planned interventions for next visit: continue with current treatment, gait  training (CPT 56075), manual therapy (CPT 66352), neuromuscular re-education (CPT 97724), therapeutic activities (CPT 88026), and therapeutic exercise (CPT 03001).

## 2022-10-24 NOTE — PROGRESS NOTES
12 hr chart check     Cephalexin Pregnancy And Lactation Text: This medication is Pregnancy Category B and considered safe during pregnancy.  It is also excreted in breast milk but can be used safely for shorter doses.

## 2022-10-25 ENCOUNTER — PHYSICAL THERAPY (OUTPATIENT)
Dept: PHYSICAL THERAPY | Facility: REHABILITATION | Age: 57
End: 2022-10-25
Attending: NURSE PRACTITIONER
Payer: COMMERCIAL

## 2022-10-25 DIAGNOSIS — R26.9 GAIT ABNORMALITY: ICD-10-CM

## 2022-10-25 PROCEDURE — 97110 THERAPEUTIC EXERCISES: CPT

## 2022-10-25 PROCEDURE — 97112 NEUROMUSCULAR REEDUCATION: CPT

## 2022-10-25 NOTE — OP THERAPY DAILY TREATMENT
Outpatient Physical Therapy  DAILY TREATMENT     [facility] Physical Therapy [address].  [address]  [place] [address]  Phone:  [phone]  Fax:  [phone]    Date: 10/25/2022    Patient: Manan Aviles  YOB: 1965  MRN: [id number]     Time Calculation    Start time: 0915  Stop time: 1005 Time Calculation (min): 50 minutes         Chief Complaint: Loss Of Balance and Difficulty Walking    Visit #: 31    SUBJECTIVE:  Reports having some neck pain after the extension ex's.   Reports performing prone extension, standing there ex, seated there ex.     OBJECTIVE:  Current objective measures:   SPV with set up of Nu step, SPV with cues to perform on/off on L side of shuttle, inc time.     Therapeutic Exercises (CPT 61283):     1. LE strengthening      Therapeutic Exercise Summary: Nu step L6 x5'. Edu on inc indep with HEP and use of gym for ex's: shuttle, seated elipicatal, recumbent bike-written in HO for follow through    BLE strengthening: BLE shuttle: 5 cords w/ wobble board  full squat 15 x 3  SL R LE 3 cords x 12 x 3; L LE 2 cords 12 x 3  -HEP HO's given for recall & performance at gym indep  -HO : bridging in half kneeling, quadruped: hip ext and shoulder flex   -multiple times edu on self performance indep, safe progression of reps, strength vs endurance    Therapeutic Treatments and Modalities:     1. Neuromuscular Re-education (CPT 34853)    Therapeutic Treatment and Modalities Summary: Standing dynamic balance on BOSU: mini squats with minimal /mod UE support x 6 reps-HEP  **pt limited by lightheadedness, BP taken in sittin/60.  Pt reported this was low for him, encouraged fluids, drank 4 oz during therapy.  Refuses additional encouragement of drinking.  Time-based treatments/modalities:  Discussed goal to inc PA at home, reinforced utilizing gym access to perform these strengthening & CV activities while at home.    ASSESSMENT:   Response to treatment: Rodolfo  improving L LE strength with shuttle training.  Limited by overall endurance with activities, requires encouragement to try new ex's in which he requires inc time and inc effort.  Cont to encourage indep and inc PA at home with gym membership.    Plan/Recommendations:   Plan for treatment: Dynamic balance, gait uneven no AD, thor rotation, thor p/s-IASTM vs cupping, assess hip flex tightness, therapy treatment to continue next visit.    Planned interventions for next visit: continue with current treatment, gait training (CPT 87994), manual therapy (CPT 27376), neuromuscular re-education (CPT 42152), therapeutic activities (CPT 43370), and therapeutic exercise (CPT 31100).

## 2022-10-28 ENCOUNTER — PHYSICAL THERAPY (OUTPATIENT)
Dept: PHYSICAL THERAPY | Facility: REHABILITATION | Age: 57
End: 2022-10-28
Attending: NURSE PRACTITIONER
Payer: COMMERCIAL

## 2022-10-28 DIAGNOSIS — R26.9 GAIT ABNORMALITY: ICD-10-CM

## 2022-10-28 PROCEDURE — 97112 NEUROMUSCULAR REEDUCATION: CPT

## 2022-10-28 PROCEDURE — 97110 THERAPEUTIC EXERCISES: CPT

## 2022-10-28 NOTE — OP THERAPY DAILY TREATMENT
Outpatient Physical Therapy  DAILY TREATMENT     [facility] Physical Therapy [address]  [address]  [place] [address]  Phone:  [phone]  Fax:  [phone]    Date: 10/28/2022    Patient: Manan Aviles  YOB: 1965  MRN: [id number]     Time Calculation    Start time: 0915  Stop time: 1015 Time Calculation (min): 60 minutes         Chief Complaint: Loss Of Balance and Difficulty Walking    Visit #: 32    SUBJECTIVE:  Reports joining [employer].  Encouraged and found his seated eliptical, shuttle LE press and some free weights.  Encouraged him to    OBJECTIVE:  Current objective measures:   Indep with use of Nu step.     Therapeutic Exercises (CPT 97661):     1. LE strengthening      Therapeutic Exercise Summary: Nu step L6 x10'-no charge    BLE strengthening: BLE shuttle: 6 cords w/ full squat 10 x 3  SL R LE 3.5 cords x 15 x 3; L LE 2 cords 15 x 3  -HEP HO's given for recall & performance at gym indep      Therapeutic Treatments and Modalities:     1. Neuromuscular Re-education (CPT 42038)    Therapeutic Treatment and Modalities Summary: Standing dynamic balance on BOSU: mini squats with minimal /mod UE support x 15 reps-HEP  **pt limited by lightheadedness, BP taken in sittin/58.  Pt reported this was low for him, encouraged fluids, drank 4 oz during therapy.   Performed:Quad<>kneeling x 10 with CGA/SBA;  Bird dog progression: Alt single UE/LE x6 ea,   Half kneeling: R LE forward, difficulty tolerating knee flex on L LE, L LE forward required maxA to move L LE into position, unable to tolerate d/t L knee being higher than L hip.  Time-based treatments/modalities:  Continue to encourage inc PA and returning to work.     ASSESSMENT:   Response to treatment: Demo follow through with [employer].  Demo ongoing low BP. Discussed oz for body weight, per pt report, he is drinking enough.  Encouraged pt take BP daily & to reach out to primary care to notify of low  BP's.    Plan/Recommendations:   Plan for treatment: Dynamic balance: uneven surfaces, BOSU standing/throwing activities, gait uneven no AD, thor rotation, assess hip flex tightness, therapy treatment to continue next visit.    Planned interventions for next visit: continue with current treatment, gait training (CPT 45902), manual therapy (CPT 88969), neuromuscular re-education (CPT 63848), therapeutic activities (CPT 40713), and therapeutic exercise (CPT 11610).

## 2022-10-31 RX ORDER — GLIPIZIDE 2.5 MG/1
TABLET, EXTENDED RELEASE ORAL
Qty: 30 TABLET | Refills: 0 | Status: SHIPPED | OUTPATIENT
Start: 2022-10-31 | End: 2022-11-01 | Stop reason: SDUPTHER

## 2022-11-01 ENCOUNTER — PHYSICAL THERAPY (OUTPATIENT)
Dept: PHYSICAL THERAPY | Facility: REHABILITATION | Age: 57
End: 2022-11-01
Attending: NURSE PRACTITIONER
Payer: COMMERCIAL

## 2022-11-01 DIAGNOSIS — I63.9 RIGHT SIDED CEREBRAL HEMISPHERE CEREBROVASCULAR ACCIDENT (CVA) (HCC): ICD-10-CM

## 2022-11-01 DIAGNOSIS — I69.30 LATE EFFECT OF STROKE: ICD-10-CM

## 2022-11-01 DIAGNOSIS — R26.9 GAIT ABNORMALITY: ICD-10-CM

## 2022-11-01 DIAGNOSIS — E11.69 TYPE 2 DIABETES MELLITUS WITH OTHER SPECIFIED COMPLICATION, WITHOUT LONG-TERM CURRENT USE OF INSULIN (HCC): ICD-10-CM

## 2022-11-01 PROCEDURE — 97110 THERAPEUTIC EXERCISES: CPT

## 2022-11-01 PROCEDURE — 97112 NEUROMUSCULAR REEDUCATION: CPT

## 2022-11-01 RX ORDER — GLIPIZIDE 2.5 MG/1
2.5 TABLET, EXTENDED RELEASE ORAL DAILY
Qty: 30 TABLET | Refills: 0 | Status: SHIPPED | OUTPATIENT
Start: 2022-11-01 | End: 2022-11-08

## 2022-11-01 NOTE — OP THERAPY DAILY TREATMENT
Outpatient Physical Therapy  DAILY TREATMENT     [facility] Physical [address].  [address]  [place] [address]  Phone:  [phone]  Fax:  [phone]    Date: 2022    Patient: Manan Aviles  YOB: 1965  MRN: [id number]     Time Calculation    Start time: 1145  Stop time: 1212 Time Calculation (min): 27 minutes         Chief Complaint: Difficulty Walking and Loss Of Balance    Visit #: 33    SUBJECTIVE:  Session cut short due to pt showing up 15min late. Has not attended [employer] yet. BP was normal this morning. States BP was low due to not eating. Walking remains difficult but able to perform all other ADLs and IADLs. Can walk w/o AD at home but always carries due to fear. Only uses if LOB occurs. Reports doing hand exercises, walking, and knee extension twice/day.       OBJECTIVE:    Current Objective Measures: completed 10/14  5xSTS: 10.25 BUE  TU.13s w/ SBQC  TUG w/o AD: 21.08s  10MWT w/o AD: 12.45s  Gait speed: .49m/s  6MWT no AD: 583' no rest breaks      Therapeutic Exercises (CPT 77456):     1. STS w/o UE, 3x10, Mirror utilized for visual cueing to weight shift on L during sets 1,2. Set 3 no mirror, needed min A verbal cueing to shirt weight to L. Difficulty performing eccentric phase.      Therapeutic Exercise Summary:       Therapeutic Treatments and Modalities:     1. Neuromuscular Re-education (CPT 00471)    Therapeutic Treatment and Modalities Summary: Neuro Re-ed:  - Sports cord walking (anterior/posterior/lateral): x3min each. Mod verbal cueing used to maintain large steps w/ L LE. Mod A used x1 after LOB. Eccentric phase during L lateral walking more difficult causing LOB x3.       97112 x 1 unit 12 min, 44758 1 unit, 15 min    ASSESSMENT:   Pt continues to show LE weakness indicated by inability to fully control eccentric phase during STS. He did show moderate improvement in performance with weight shifting to L LE during standing after  practicing w/ mirror. Pt educated on cane use at home to either use it or leave it but to not carry it around. Pt will continue to benefit from therapy to improve balance, LE strength, and gait w/o AD.     Plan/Recommendations:   Plan for treatment: Continue POC as tolerated by pt.       [x] As the licensed therapist supervising this student, I was present during the entire treatment session directing the care and reviewing the assessment plan.  I reviewed all documentation prior to signing.

## 2022-11-08 ENCOUNTER — OFFICE VISIT (OUTPATIENT)
Dept: MEDICAL GROUP | Facility: OTHER | Age: 57
End: 2022-11-08
Payer: COMMERCIAL

## 2022-11-08 VITALS — HEART RATE: 80 BPM | DIASTOLIC BLOOD PRESSURE: 76 MMHG | SYSTOLIC BLOOD PRESSURE: 126 MMHG | RESPIRATION RATE: 12 BRPM

## 2022-11-08 DIAGNOSIS — I69.30 LATE EFFECT OF STROKE: ICD-10-CM

## 2022-11-08 DIAGNOSIS — I50.20 HFREF (HEART FAILURE WITH REDUCED EJECTION FRACTION) (HCC): ICD-10-CM

## 2022-11-08 DIAGNOSIS — E11.69 TYPE 2 DIABETES MELLITUS WITH OTHER SPECIFIED COMPLICATION, WITHOUT LONG-TERM CURRENT USE OF INSULIN (HCC): ICD-10-CM

## 2022-11-08 DIAGNOSIS — Z79.4 TYPE 2 DIABETES MELLITUS WITH OTHER SPECIFIED COMPLICATION, WITH LONG-TERM CURRENT USE OF INSULIN (HCC): ICD-10-CM

## 2022-11-08 DIAGNOSIS — E78.2 MIXED HYPERLIPIDEMIA: ICD-10-CM

## 2022-11-08 DIAGNOSIS — I25.83 CORONARY ARTERY DISEASE DUE TO LIPID RICH PLAQUE: ICD-10-CM

## 2022-11-08 DIAGNOSIS — I25.10 CORONARY ARTERY DISEASE DUE TO LIPID RICH PLAQUE: ICD-10-CM

## 2022-11-08 DIAGNOSIS — E11.69 TYPE 2 DIABETES MELLITUS WITH OTHER SPECIFIED COMPLICATION, WITH LONG-TERM CURRENT USE OF INSULIN (HCC): ICD-10-CM

## 2022-11-08 DIAGNOSIS — I63.9 RIGHT SIDED CEREBRAL HEMISPHERE CEREBROVASCULAR ACCIDENT (CVA) (HCC): ICD-10-CM

## 2022-11-08 LAB
HBA1C MFR BLD: 8.5 % (ref 0–5.6)
INT CON NEG: NEGATIVE
INT CON POS: POSITIVE

## 2022-11-08 PROCEDURE — 99214 OFFICE O/P EST MOD 30 MIN: CPT | Performed by: FAMILY MEDICINE

## 2022-11-08 PROCEDURE — 83036 HEMOGLOBIN GLYCOSYLATED A1C: CPT | Performed by: FAMILY MEDICINE

## 2022-11-08 RX ORDER — CLOPIDOGREL BISULFATE 75 MG/1
75 TABLET ORAL DAILY
Qty: 90 TABLET | Refills: 3 | Status: SHIPPED | OUTPATIENT
Start: 2022-11-08 | End: 2023-12-08 | Stop reason: SDUPTHER

## 2022-11-08 RX ORDER — GLIPIZIDE 10 MG/1
10 TABLET, FILM COATED, EXTENDED RELEASE ORAL DAILY
Qty: 90 TABLET | Refills: 3 | Status: SHIPPED | OUTPATIENT
Start: 2022-11-08

## 2022-11-08 NOTE — PROGRESS NOTES
[facility] MEDICINE     PATIENT ID:  NAME:  Israel Aviles  MRN:               [id number]  YOB: 1965    Date: 9:42 AM      CC:  diabetes, heart disease      HPI: Israel Aviles is a 57 y.o. male who presented with multiple concerns to address today.  Prior MI ad CVA.     Problem   Late Effect of Stroke    He has improved his function with Physical Therapy this past year.  Has walked at [facility] when weather was better.  Has been cleared to join a gym if he wishes.      Type 2 Diabetes Mellitus With Other Specified Complication ([facility])    Has been able to get in with Endocrinology.  Had bene put on Mounjaro and Trulicity but had trouble tolerating them, and developed bad constipation and his sugars have been erratic and higher he feels.   He is interested in subcutaneous glucose monitoring.  Has had difficult to control diabetes for some years.  Has had a prior MI and more recently a stroke in February, 2022.  Not using insulin now as outpatient.  Has a left lower extremity amputation.      Hfref (Heart Failure With Reduced Ejection Fraction) ([facility])    Has been followed by Cardiology every 6 months and will continue to see them regularly.  Meds reviewed today at visit.      Hyperlipidemia    He is taking high dose Atorvastatin daily.  On Plavix and aspirin daily.  Watches his diet.          REVIEW OF SYSTEMS:   Ten systems reviewed and were negative except as noted in the HPI.                PROBLEM LIST  Patient Active Problem List   Diagnosis    Acute ischemic stroke (HCC)    Uncontrolled type 2 diabetes mellitus with hyperglycemia ([facility])    Hyperlipidemia    Wound infection    PVD (peripheral vascular disease) ([facility])    Diabetic foot ulcer (HCC)    Diabetic polyneuropathy associated with type 2 diabetes mellitus ([facility])    Pressure injury of deep tissue of left foot    Diabetic ulcer of toe of left foot associated with type 2 diabetes mellitus, with necrosis of muscle (HCC)    Osteomyelitis of  left foot (HCC)    Diabetic foot (HCC)    Vitamin D deficiency    CAD (coronary artery disease)    HTN (hypertension)    Orthostatic hypotension    Non-healing wound of right heel    Below-knee amputation (HCC)    Adjustment disorder with depressed mood    HFrEF (heart failure with reduced ejection fraction) (HCC)    Ischemic cardiomyopathy    Body mass index (BMI) 23.0-23.9, adult    Type 2 diabetes mellitus with other specified complication (HCC)    TIA (transient ischemic attack)    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)    Dyslipidemia due to type 2 diabetes mellitus (HCC)    Colon cancer screening declined    Neurological deficit present    Stroke-like symptoms    Anemia    Right sided cerebral hemisphere cerebrovascular accident (CVA) ([facility])    Late effect of stroke    Drug therapy    Anxiety    Diabetes mellitus (HCC)    Mixed hyperlipidemia        PAST SURGICAL HISTORY:  Past Surgical History:   Procedure Laterality Date    KNEE AMPUTATION BELOW Left 12/20/2019    Procedure: AMPUTATION, BELOW KNEE;  Surgeon: Koby Zhao M.D.;  Location: SURGERY [facility];  Service: Orthopedics    Z CARDIAC CATH  12/16/2019    multi-vessel disease    IRRIGATION & DEBRIDEMENT ORTHO Left 6/24/2019    Procedure: IRRIGATION AND DEBRIDEMENT, WOUND-FOOT, BIOLOGIC PLACEMENT, WOUND VAC PLACEMENT;  Surgeon: Charles Chopra M.D.;  Location: SURGERY [facility];  Service: Orthopedics       FAMILY HISTORY:  Family History   Problem Relation Age of Onset    Diabetes Mother     Diabetes Father        SOCIAL HISTORY:   Social History     Tobacco Use    Smoking status: Never    Smokeless tobacco: Never   Substance Use Topics    Alcohol use: No       ALLERGIES:  No Known Allergies    OUTPATIENT MEDICATIONS:    Current Outpatient Medications:     glipiZIDE SR (GLUCOTROL) 10 MG TABLET SR 24 HR, Take 1 Tablet by mouth every day., Disp: 90 Tablet, Rfl: 3    clopidogrel (PLAVIX) 75 MG Tab, Take 1 Tablet  by mouth every day., Disp: 90 Tablet, Rfl: 3    metFORMIN (GLUCOPHAGE) 500 MG Tab, Take 1 Tablet by mouth 2 times a day with meals., Disp: 180 Tablet, Rfl: 3    amoxicillin (AMOXIL) 500 MG Cap, , Disp: , Rfl:     atorvastatin (LIPITOR) 80 MG tablet, Take 1 Tablet by mouth every day for 90 days., Disp: 90 Tablet, Rfl: 1    gabapentin (NEURONTIN) 100 MG Cap, Take 1 Capsule by mouth at bedtime as needed (pain/insomnia)., Disp: 30 Capsule, Rfl: 11    LUMIGAN 0.01 % Solution, INSTILL 1 DROP INTO EACH EYE AT BEDTIME, Disp: , Rfl:     metoprolol SR (TOPROL XL) 25 MG TABLET SR 24 HR, Take 2 Tablets by mouth 2 times a day. (Patient taking differently: Take 50 mg by mouth 2 times a day. Indications: takes 1/2 in am 1/2 pm), Disp: 120 Tablet, Rfl: 2    vitamin D3 2000 UNIT Tab, Take 1 Tablet by mouth every day., Disp: 60 Tablet, Rfl:     multivitamin (THERAGRAN) Tab, Take 1 Tablet by mouth every day., Disp: , Rfl:     aspirin 81 MG EC tablet, Take 1 tablet by mouth every day., Disp: 30 tablet, Rfl: 2    PHYSICAL EXAM:  Vitals:    11/08/22 0940   BP: 126/76   BP Location: Left arm   Patient Position: Sitting   BP Cuff Size: Adult   Pulse: 80   Resp: 12       General: Pt resting in NAD, cooperative   Skin:  Pink, warm and dry.  HEENT: NC/AT. EOMI.  Lungs:  Symmetrical.  CTAB, good air movement   Cardiovascular:  Normal S1/S2, RRR without murmur or gallop.   Abdomen:  Abdomen is soft, nontender  Extremities:  Left BKA; monofilament testing done on right foot and failed all points tested.   CNS:  Muscle tone is normal. No gross focal neurologic deficits      ASSESSMENT/PLAN:   57 y.o. male     Problem List Items Addressed This Visit       CAD (coronary artery disease)    Diabetes mellitus (HCC)    Relevant Medications    glipiZIDE SR (GLUCOTROL) 10 MG TABLET SR 24 HR    Other Relevant Orders    POCT A1C    POCT A1C    HFrEF (heart failure with reduced ejection fraction) ([facility])     Continue with Cardiology follow-up.   Continue on  statin, baby aspirin, Plavix, Toprol XR.  May refill these maintenance medications for one year as needed.             Relevant Medications    clopidogrel (PLAVIX) 75 MG Tab    Hyperlipidemia     Continue daily statin therapy.   May refill this maintenance medication for one year as needed.               Late effect of stroke     Maximize medical therapies.   Exercise: 150 minutes a week of vigorous exercise is recommended for good health.   Walking was encouraged as much as possible.          Right sided cerebral hemisphere cerebrovascular accident (CVA) (HCC)    Relevant Medications    clopidogrel (PLAVIX) 75 MG Tab    Type 2 diabetes mellitus with other specified complication (HCC)     Has been to Pharmacotherapy for medication adjustments.  Sees to be struggling a bit with regimen.   Has some stress and anxiety about his sugars.  Declines to try counseling.           Relevant Medications    glipiZIDE SR (GLUCOTROL) 10 MG TABLET SR 24 HR    Other Relevant Orders    POCT A1C    POCT A1C       Donny Mcnally MD  [facility]

## 2022-11-08 NOTE — ASSESSMENT & PLAN NOTE
Maximize medical therapies.   Exercise: 150 minutes a week of vigorous exercise is recommended for good health.   Walking was encouraged as much as possible.

## 2022-11-08 NOTE — ASSESSMENT & PLAN NOTE
Has been to Pharmacotherapy for medication adjustments.  Sees to be struggling a bit with regimen.   Has some stress and anxiety about his sugars.  Declines to try counseling.

## 2022-11-08 NOTE — ASSESSMENT & PLAN NOTE
Continue daily statin therapy.   May refill this maintenance medication for one year as needed.

## 2022-11-08 NOTE — ASSESSMENT & PLAN NOTE
Continue with Cardiology follow-up.   Continue on statin, baby aspirin, Plavix, Toprol XR.  May refill these maintenance medications for one year as needed.

## 2022-11-22 ENCOUNTER — PHYSICAL THERAPY (OUTPATIENT)
Dept: PHYSICAL THERAPY | Facility: REHABILITATION | Age: 57
End: 2022-11-22
Attending: NURSE PRACTITIONER
Payer: COMMERCIAL

## 2022-11-22 DIAGNOSIS — R26.9 GAIT ABNORMALITY: ICD-10-CM

## 2022-11-22 DIAGNOSIS — I69.30 LATE EFFECT OF STROKE: ICD-10-CM

## 2022-11-22 PROCEDURE — 97112 NEUROMUSCULAR REEDUCATION: CPT

## 2022-11-22 NOTE — OP THERAPY DAILY TREATMENT
"  Outpatient Physical Therapy  DAILY TREATMENT     [facility] [place] [address]  [address]  [place] [address]  Phone:  [phone]  Fax:  [phone]    Date: 11/22/2022    Patient: Manan Aviles  YOB: 1965  MRN: [id number]     Time Calculation    Start time: 1045  Stop time: 1130 Time Calculation (min): 45 minutes         Chief Complaint: Loss Of Balance and Difficulty Walking    Visit #: 34    SUBJECTIVE:  Denies new concerns or changes.  Report performing HEP at home. Reports going to [employer] every day with spouse.      OBJECTIVE:  Current objective measures:   Utilized quad cane for ambulation, wide GILLIAN, step to , bradykinetic.    Therapeutic Exercises (CPT 79976):     1. LE strengthening      Therapeutic Exercise Summary: STS from 17\"H with pillow on top 5 reps x 2 sets , unable to perform 3rd d/t fatigued  -forced use BLE's, vc's edu and reviewed hip hinge and glut engagement.       Therapeutic Treatments and Modalities:     1. Neuromuscular Re-education (CPT 71178)    Therapeutic Treatment and Modalities Summary: Standing balance reactions performed: reactive balance backward/forward required max cues for comprehension and stability through grounding of feet holding trunk stable, with repeated >10 able to maintain balance reactions w/o stepping.  Balance reactions outside of GILLIAN : ant>posterior difficulty.  Initial 3 reps forward, pt reached and used therapist's body to catch self. Repeated performed > 20reps, required 3-4 steps to recover.  By end able to perform single to 2 step recovery with righting trunk response.      SLS focus on hip hinge in // bars: R LE able to perform x 5 reps with max cues, poor hip hinge, demo lumbar flex substitution  L LE multiple buckling, R LE substituted.  Cont edu on performing smaller range, prior to buckling , able to perform 2-3 reps fair.     Time-based treatments/modalities:       ASSESSMENT:   Response to " treatment: Progressed with balance reactions during session, follow up for carryover.      Plan/Recommendations:   Plan for treatment:Reassess balance reactions, Dynamic balance: uneven surfaces, BOSU standing/throwing activities, gait uneven no AD, thor rotation, assess hip flex tightness, therapy treatment to continue next visit.    Planned interventions for next visit: continue with current treatment, gait training (CPT 47571), manual therapy (CPT 49140), neuromuscular re-education (CPT 43289), therapeutic activities (CPT 36516), and therapeutic exercise (CPT 47864).

## 2022-11-28 ENCOUNTER — NON-PROVIDER VISIT (OUTPATIENT)
Dept: MEDICAL GROUP | Facility: PHYSICIAN GROUP | Age: 57
End: 2022-11-28
Payer: COMMERCIAL

## 2022-11-28 VITALS
WEIGHT: 172 LBS | HEART RATE: 83 BPM | BODY MASS INDEX: 23.3 KG/M2 | DIASTOLIC BLOOD PRESSURE: 79 MMHG | SYSTOLIC BLOOD PRESSURE: 123 MMHG | HEIGHT: 72 IN

## 2022-11-28 DIAGNOSIS — E11.69 TYPE 2 DIABETES MELLITUS WITH OTHER SPECIFIED COMPLICATION, WITH LONG-TERM CURRENT USE OF INSULIN (HCC): ICD-10-CM

## 2022-11-28 DIAGNOSIS — Z79.4 TYPE 2 DIABETES MELLITUS WITH OTHER SPECIFIED COMPLICATION, WITH LONG-TERM CURRENT USE OF INSULIN (HCC): ICD-10-CM

## 2022-11-28 DIAGNOSIS — E78.2 MIXED HYPERLIPIDEMIA: ICD-10-CM

## 2022-11-28 DIAGNOSIS — I63.9 RIGHT SIDED CEREBRAL HEMISPHERE CEREBROVASCULAR ACCIDENT (CVA) (HCC): ICD-10-CM

## 2022-11-28 DIAGNOSIS — E11.69 TYPE 2 DIABETES MELLITUS WITH OTHER SPECIFIED COMPLICATION, WITHOUT LONG-TERM CURRENT USE OF INSULIN (HCC): ICD-10-CM

## 2022-11-28 PROCEDURE — 99402 PREV MED CNSL INDIV APPRX 30: CPT | Performed by: FAMILY MEDICINE

## 2022-11-28 RX ORDER — DAPAGLIFLOZIN 5 MG/1
5 TABLET, FILM COATED ORAL DAILY
Qty: 30 TABLET | Refills: 6 | Status: SHIPPED | OUTPATIENT
Start: 2022-11-28 | End: 2023-03-20

## 2022-11-28 ASSESSMENT — FIBROSIS 4 INDEX: FIB4 SCORE: 1.97

## 2022-11-28 NOTE — PROGRESS NOTES
Patient Consult Note     TIME IN: 1030  TIME OUT: 1113  Time with Pt 43    Primary care physician: Donny Mcnally M.D.    Reason for consult: Management of Uncontrolled Type 2 Diabetes    HPI:  Israel Aviles is a 56 y.o. old patient who comes in today for evaluation of above stated problem.    Allergies:  Patient has no known allergies.    Most Recent labs, A1c, and immunizations:    Lab Results   Component Value Date/Time    HBA1C 8.5 (A) 11/08/2022 09:40 AM          Lab Results   Component Value Date/Time    CHOLSTRLTOT 137 02/17/2022 09:46 AM    LDL 65 02/17/2022 09:46 AM    HDL 43 02/17/2022 09:46 AM    TRIGLYCERIDE 143 02/17/2022 09:46 AM       Lab Results   Component Value Date/Time    SODIUM 138 03/02/2022 06:04 AM    POTASSIUM 4.1 03/02/2022 06:04 AM    CHLORIDE 101 03/02/2022 06:04 AM    CO2 26 03/02/2022 06:04 AM    GLUCOSE 134 (H) 03/02/2022 06:04 AM    BUN 18 03/02/2022 06:04 AM    CREATININE 0.74 03/02/2022 06:04 AM    CREATININE 0.9 05/09/2007 01:20 AM    BUNCREATRAT 14 11/10/2021 06:52 AM     Lab Results   Component Value Date/Time    ALKPHOSPHAT 92 02/25/2022 05:53 AM    ASTSGOT 31 02/25/2022 05:53 AM    ALTSGPT 30 02/25/2022 05:53 AM    TBILIRUBIN 0.3 02/25/2022 05:53 AM    INR 1.05 02/17/2022 09:46 AM    ALBUMIN 3.9 02/25/2022 05:53 AM      Lab Results   Component Value Date/Time    MALBCRT 40 (H) 03/04/2015 09:00 AM    MICROALBUR 4.7 03/04/2015 09:00 AM     Immunization History   Administered Date(s) Administered    Hep A/HEP B Combined Vaccine (TwinRix) 08/06/2021    Hepatitis B Vaccine (Adol/Adult) 06/09/2008    Influenza (IM) Preservative Free - HISTORICAL DATA 10/15/2010    Influenza Vac Subunit Quad Inj (Pf) 12/11/2021    Influenza Vaccine H1N1 - HISTORICAL DATA 02/27/2010    MMR Vaccine 06/09/2008    MODERNA SARS-COV-2 VACCINE (12+) 01/04/2022    PFIZER PURPLE CAP SARS-COV-2 VACCINATION (12+) 04/07/2021, 04/30/2021    TD Vaccine 06/09/2008    Varicella Vaccine Live 06/09/2008          Medications:    Current Outpatient Medications:     metFORMIN, 500 mg, Oral, BID WITH MEALS    glipiZIDE SR, 10 mg, Oral, DAILY    clopidogrel, 75 mg, Oral, DAILY    amoxicillin,     atorvastatin, 80 mg, Oral, DAILY    gabapentin, 100 mg, Oral, QHS PRN    Lumigan, INSTILL 1 DROP INTO EACH EYE AT BEDTIME    metoprolol SR, 50 mg, Oral, BID (Patient taking differently: 50 mg, Oral, 2 TIMES DAILY, Indications: takes 1/2 in am 1/2 pm)    vitamin D3, 2,000 Units, Oral, DAILY    multivitamin, 1 Tablet, Oral, DAILY    aspirin, 81 mg, Oral, DAILY    Diabetes Medication Current Regimen:  glipizide SR 10mg daily   Continue metformin 500mg twice daily       History/ Failed Diabetic Medications if applicable:  jardiance low BP   Trulicity - due to constipation and low appetite     Preventative Management  BP regimen (ACE/ARB) - no  ASA - yes  Statin - lipitor   Last Retinal Scan - done   Monofilament/foot exam -BKA, sees podiatrist regularly.    Pt has home glucometer and proper testing technique - yes  Pt reports blood sugars:   Before Breakfast: 135-165  Before Lunch:   Before Dinner: <125  Before Bedtime:   Other times:    Hypoglycemia awareness - yes  Nocturnal hypoglycemia- yes  Hypoglycemia:  Occasional    Physical Examination:  Vital signs: There were no vitals taken for this visit. There is no height or weight on file to calculate BMI.    Change in weight: Stable  Exercise habits: minimal exercise   Diet: DASH diet    Assessment and Plan:  1. DM2   A1C above goal  Subsequent appointments consider optimizing metformin  Medication(s) recommended:   glipizide SR 10mg daily   Continue metformin 1000mg twice daily   Start Farxiga 5mg daily       -Blood glucose and A1c target    However, more aggressive diabetic control is reasonable when tolerated.  Pt's treatment of Hypoglycemia. 15:15 Rule discussed with patient      2.  Cardiovascular  ldl are at goal   Medication(s) recommended.   Continue with Lipitor 80mg daily        3.  Lifestyle changes   Focus on eating a DASH/Mediterranean-style diet.   Exercise 30 minutes daily at least 5 days/week, as tolerated.  https://www.niddk.nih.gov/bwp        Follow-up appointment in 6 week(s)    Jevon Barnard, PharmD, MS, BCACP, [facility]  Phone [phone] fax [phone]    This note was created using voice recognition software (Dragon). The accuracy of the dictation is limited by the abilities of the software. I have reviewed the note prior to signing, however some errors in grammar and context are still possible. If you have any questions related to this note please do not hesitate to contact our office.     CC:   Donny Mcnally M.D.  No ref. provider found  Dr. Jude Dallas

## 2022-11-28 NOTE — Clinical Note
Phillip, They said they got a bill for $1700 for our appointments at the [facility].  They are able to see other [facility] providers at medical groups without a issue.  Has anything changed with this insurance?  Thanks, Kashif

## 2022-11-29 ENCOUNTER — APPOINTMENT (OUTPATIENT)
Dept: PHYSICAL THERAPY | Facility: REHABILITATION | Age: 57
End: 2022-11-29
Attending: NURSE PRACTITIONER
Payer: COMMERCIAL

## 2022-11-29 NOTE — OP THERAPY PROGRESS SUMMARY
Outpatient Physical Therapy  PROGRESS SUMMARY NOTE      [facility] Physical Therapy [address].  [address]  [place] [address]  Phone:  [phone]  Fax:  [phone]    Date of Visit: 11/22/2022    Patient: Manan Aviles  YOB: 1965  MRN: [id number]     Referring Provider: BANDAR Shahid  [facility] [address]  [place] [address]   Referring Diagnosis Unspecified sequelae of cerebral infarction [I69.30]     Visit Diagnoses     ICD-10-CM   1. Gait abnormality  R26.9   2. Late effect of stroke  I69.30       Rehab Potential: good    Progress Report Period: 10/14/2022 - 11/29/2022        Objective Findings and Assessment:   Patient progression towards goals: Progressing slowly, cont to be limited by carryover into function and community mobility, primary barriers: impaired balance, impaired balance reactions, insight and community based indep with mobility.    Objective findings and assessment details: Improved postural dynamic reactions within session, cont to be limited by carryover into function and community.    Goals:   Short Term Goals:   1. Pt demo more symmetrical gait pattern for safety and minimized fall risk.- PARTIALLY MET, CONTINUE.  2. Pt able to walk 400 feet no AD Zac in community.- Not Met        Short term goal time span:  2-4 weeks      Long Term Goals:    1. Pt able to perform sit <-> stand from standard chair without UE support.- NOT MET, CONTINUE.  2. Pt will walk up/down uneven no AD surfaces Zac-Not met continue  3. Pt will improve mini BEST vs WEISS to inde indep in community without an AD-Not met, need to reassess  Long term goal time span:  6-8 weeks    Plan:   Planned therapy interventions:  Gait Training (CPT 62493), Manual Therapy (CPT 58261), Neuromuscular Re-education (CPT 28867), Therapeutic Exercise (CPT 96031) and Therapeutic Activities (CPT 73707)  Frequency:  1x week  Duration in weeks:  8    Referring provider co-signature:  LOUISA  have reviewed this plan of care and my co-signature certifies the need for services.     Certification Period: 11/22/2022 to 01/24/23    Physician Signature: ________________________________ Date: ______________

## 2022-12-02 ENCOUNTER — APPOINTMENT (OUTPATIENT)
Dept: PHYSICAL THERAPY | Facility: REHABILITATION | Age: 57
End: 2022-12-02
Attending: NURSE PRACTITIONER
Payer: COMMERCIAL

## 2022-12-06 ENCOUNTER — APPOINTMENT (OUTPATIENT)
Dept: PHYSICAL THERAPY | Facility: REHABILITATION | Age: 57
End: 2022-12-06
Attending: NURSE PRACTITIONER
Payer: COMMERCIAL

## 2022-12-09 ENCOUNTER — APPOINTMENT (OUTPATIENT)
Dept: PHYSICAL THERAPY | Facility: REHABILITATION | Age: 57
End: 2022-12-09
Attending: NURSE PRACTITIONER
Payer: COMMERCIAL

## 2022-12-13 ENCOUNTER — APPOINTMENT (OUTPATIENT)
Dept: PHYSICAL THERAPY | Facility: REHABILITATION | Age: 57
End: 2022-12-13
Attending: NURSE PRACTITIONER
Payer: COMMERCIAL

## 2022-12-16 ENCOUNTER — APPOINTMENT (OUTPATIENT)
Dept: PHYSICAL THERAPY | Facility: REHABILITATION | Age: 57
End: 2022-12-16
Attending: NURSE PRACTITIONER
Payer: COMMERCIAL

## 2022-12-20 ENCOUNTER — APPOINTMENT (OUTPATIENT)
Dept: PHYSICAL THERAPY | Facility: REHABILITATION | Age: 57
End: 2022-12-20
Attending: NURSE PRACTITIONER
Payer: COMMERCIAL

## 2022-12-23 ENCOUNTER — APPOINTMENT (OUTPATIENT)
Dept: PHYSICAL THERAPY | Facility: REHABILITATION | Age: 57
End: 2022-12-23
Attending: NURSE PRACTITIONER
Payer: COMMERCIAL

## 2022-12-27 ENCOUNTER — APPOINTMENT (OUTPATIENT)
Dept: PHYSICAL THERAPY | Facility: REHABILITATION | Age: 57
End: 2022-12-27
Attending: NURSE PRACTITIONER
Payer: COMMERCIAL

## 2022-12-30 ENCOUNTER — APPOINTMENT (OUTPATIENT)
Dept: PHYSICAL THERAPY | Facility: REHABILITATION | Age: 57
End: 2022-12-30
Attending: NURSE PRACTITIONER
Payer: COMMERCIAL

## 2023-01-03 ENCOUNTER — APPOINTMENT (OUTPATIENT)
Dept: PHYSICAL THERAPY | Facility: REHABILITATION | Age: 58
End: 2023-01-03
Attending: NURSE PRACTITIONER
Payer: COMMERCIAL

## 2023-01-10 ENCOUNTER — APPOINTMENT (OUTPATIENT)
Dept: PHYSICAL THERAPY | Facility: REHABILITATION | Age: 58
End: 2023-01-10
Attending: NURSE PRACTITIONER
Payer: COMMERCIAL

## 2023-01-17 ENCOUNTER — APPOINTMENT (OUTPATIENT)
Dept: PHYSICAL THERAPY | Facility: REHABILITATION | Age: 58
End: 2023-01-17
Attending: NURSE PRACTITIONER
Payer: COMMERCIAL

## 2023-01-24 ENCOUNTER — APPOINTMENT (OUTPATIENT)
Dept: PHYSICAL THERAPY | Facility: REHABILITATION | Age: 58
End: 2023-01-24
Attending: NURSE PRACTITIONER
Payer: COMMERCIAL

## 2023-01-31 ENCOUNTER — APPOINTMENT (OUTPATIENT)
Dept: PHYSICAL THERAPY | Facility: REHABILITATION | Age: 58
End: 2023-01-31
Attending: NURSE PRACTITIONER
Payer: COMMERCIAL

## 2023-02-06 NOTE — TELEPHONE ENCOUNTER
Received request via: Patient    Was the patient seen in the last year in this department? Yes- Has an upcoming appointment on 11/11/2021    Does the patient have an active prescription (recently filled or refills available) for medication(s) requested? No   Rx Refill Note  Requested Prescriptions     Pending Prescriptions Disp Refills   • amLODIPine (NORVASC) 10 MG tablet [Pharmacy Med Name: AMLODIPINE BESYLATE 10MG TABLETS] 30 tablet 0     Sig: Take 1 tablet by mouth Daily. NO ADDITIONAL REFILLS WITHOUT AN APPOINTMENT      Last office visit with prescribing clinician: 5/3/2022   Last telemedicine visit with prescribing clinician: Visit date not found   Next office visit with prescribing clinician: Visit date not found   {TIP  Encounters:     Calcium-Channel Blockers Protocol Failed 02/06/2023 05:59 AM   Protocol Details  BP under 140/90 in past year    Recent or future visit with authorizing provider            {TIP  Please add Last Relevant Lab Date if appropriate             {TIP  Is Refill Pharmacy correct? YES    Would you like a call back once the refill request has been completed: [] Yes [] No    If the office needs to give you a call back, can they leave a voicemail: [] Yes [] No    Lily Shelton LPN  02/06/23, 10:01 CST

## 2023-02-16 LAB
ALBUMIN SERPL-MCNC: 4.7 G/DL (ref 3.8–4.9)
ALBUMIN/CREAT UR: 18 MG/G CREAT (ref 0–29)
ALBUMIN/GLOB SERPL: 1.5 {RATIO} (ref 1.2–2.2)
ALP SERPL-CCNC: 88 IU/L (ref 44–121)
ALT SERPL-CCNC: 16 IU/L (ref 0–44)
AST SERPL-CCNC: 18 IU/L (ref 0–40)
BILIRUB SERPL-MCNC: 0.3 MG/DL (ref 0–1.2)
BUN SERPL-MCNC: 14 MG/DL (ref 6–24)
BUN/CREAT SERPL: 16 (ref 9–20)
CALCIUM SERPL-MCNC: 9.2 MG/DL (ref 8.7–10.2)
CHLORIDE SERPL-SCNC: 103 MMOL/L (ref 96–106)
CHOLEST SERPL-MCNC: 241 MG/DL (ref 100–199)
CO2 SERPL-SCNC: 24 MMOL/L (ref 20–29)
CREAT SERPL-MCNC: 0.86 MG/DL (ref 0.76–1.27)
CREAT UR-MCNC: 115 MG/DL
EGFRCR SERPLBLD CKD-EPI 2021: 101 ML/MIN/1.73
GLOBULIN SER CALC-MCNC: 3.1 G/DL (ref 1.5–4.5)
GLUCOSE SERPL-MCNC: 173 MG/DL (ref 70–99)
HBA1C MFR BLD: 9.2 % (ref 4.8–5.6)
HDLC SERPL-MCNC: 54 MG/DL
LABORATORY COMMENT REPORT: ABNORMAL
LDLC SERPL CALC-MCNC: 161 MG/DL (ref 0–99)
MICROALBUMIN UR-MCNC: 20.9 UG/ML
POTASSIUM SERPL-SCNC: 4.5 MMOL/L (ref 3.5–5.2)
PROT SERPL-MCNC: 7.8 G/DL (ref 6–8.5)
SODIUM SERPL-SCNC: 141 MMOL/L (ref 134–144)
TRIGL SERPL-MCNC: 142 MG/DL (ref 0–149)
VLDLC SERPL CALC-MCNC: 26 MG/DL (ref 5–40)

## 2023-02-22 NOTE — ED NOTES
Med rec updated and complete  Allergies reviewed  Interviewed pt with family at bedside with permission from pt.  Pt reports no vitamins.  Pt was on BACTRIM 800-160MG on 8/16/2019 for 14 day course, but stopped this medications on 8/18/2019.         Fusiform Excision Additional Text (Leave Blank If You Do Not Want): The margin was drawn around the clinically apparent lesion.  A fusiform shape was then drawn on the skin incorporating the lesion and margins.  Incisions were then made along these lines to the appropriate tissue plane and the lesion was extirpated.

## 2023-02-25 ENCOUNTER — PATIENT MESSAGE (OUTPATIENT)
Dept: MEDICAL GROUP | Facility: CLINIC | Age: 58
End: 2023-02-25
Payer: COMMERCIAL

## 2023-02-28 ENCOUNTER — OFFICE VISIT (OUTPATIENT)
Dept: MEDICAL GROUP | Facility: CLINIC | Age: 58
End: 2023-02-28
Payer: COMMERCIAL

## 2023-02-28 VITALS
WEIGHT: 170 LBS | OXYGEN SATURATION: 96 % | HEART RATE: 80 BPM | DIASTOLIC BLOOD PRESSURE: 74 MMHG | SYSTOLIC BLOOD PRESSURE: 108 MMHG | BODY MASS INDEX: 23.03 KG/M2 | HEIGHT: 72 IN | TEMPERATURE: 96.2 F

## 2023-02-28 DIAGNOSIS — I63.9 RIGHT SIDED CEREBRAL HEMISPHERE CEREBROVASCULAR ACCIDENT (CVA) (HCC): ICD-10-CM

## 2023-02-28 DIAGNOSIS — E11.65 UNCONTROLLED TYPE 2 DIABETES MELLITUS WITH HYPERGLYCEMIA (HCC): ICD-10-CM

## 2023-02-28 DIAGNOSIS — I63.9 ACUTE ISCHEMIC STROKE (HCC): ICD-10-CM

## 2023-02-28 DIAGNOSIS — E11.69 TYPE 2 DIABETES MELLITUS WITH OTHER SPECIFIED COMPLICATION, WITHOUT LONG-TERM CURRENT USE OF INSULIN (HCC): ICD-10-CM

## 2023-02-28 PROCEDURE — 99213 OFFICE O/P EST LOW 20 MIN: CPT | Mod: GE | Performed by: STUDENT IN AN ORGANIZED HEALTH CARE EDUCATION/TRAINING PROGRAM

## 2023-02-28 RX ORDER — ATORVASTATIN CALCIUM 80 MG/1
80 TABLET, FILM COATED ORAL DAILY
COMMUNITY
Start: 2023-02-05 | End: 2024-01-08

## 2023-02-28 ASSESSMENT — FIBROSIS 4 INDEX: FIB4 SCORE: 1.56

## 2023-02-28 NOTE — ASSESSMENT & PLAN NOTE
- Patient will stop Glucotrol  - I counseled the patient to increase metformin to 1000 mg twice daily; he may slowly uptitrate the dose, to avoid abdominal pain, nausea, diarrhea  - Patient will continue to take his Farxiga as directed  - Patient will follow-up in approximately 3 months, for A1c recheck and to discuss medication optimization-the patient's chart stated that he did not take a statin, but the patient is on 80 mg of atorvastatin daily

## 2023-02-28 NOTE — ASSESSMENT & PLAN NOTE
- I reinforced with the patient that he should get least 150 minutes of aerobic exercise per week  - Patient is also agreeable to continued to take his guideline directed medical therapy

## 2023-02-28 NOTE — PROGRESS NOTES
Subjective:     CC: Follow-up diabetes    HPI:   Israel presents today with:    Problem   Uncontrolled Type 2 Diabetes Mellitus With Hyperglycemia (Hcc)    -Patient has a history of type 2 diabetes on metformin 500 mg twice daily (his chart says that he takes 1000 mg twice daily, but the patient states he does not, states he only takes 500 mg in the morning and 5 mg at night), Farxiga, and Glucotrol  - Patient and spouse state that his sugars get as low as 70s to 80s in the evening, denies symptoms of hypoglycemia  - Patient's last A1c was 9.2 in February 2023  - Patient is wondering what he can do to have better control of his blood sugar, states that he never sees a get above the 160s, and checks it daily  - Patient denies headaches, vision changes, weakness, syncope or near syncope, abdominal pain, diarrhea, polyuria, polyphagia, or weight changes     Acute Ischemic Stroke (Hcc)    - Patient has a history of ischemic stroke, he states he is currently exercising daily, goes to the gym, goes for walks, and he continues to take his atorvastatin, aspirin, Plavix         Current Outpatient Medications Ordered in Epic   Medication Sig Dispense Refill    metFORMIN (GLUCOPHAGE) 500 MG Tab Take 2 Tablets by mouth 2 times a day with meals. 360 Tablet 1    atorvastatin (LIPITOR) 80 MG tablet Take 80 mg by mouth every day. FOR 90 DAYS      dapagliflozin propanediol (FARXIGA) 5 MG Tab Take 1 Tablet by mouth every day. 30 Tablet 6    glipiZIDE SR (GLUCOTROL) 10 MG TABLET SR 24 HR Take 1 Tablet by mouth every day. 90 Tablet 3    clopidogrel (PLAVIX) 75 MG Tab Take 1 Tablet by mouth every day. 90 Tablet 3    LUMIGAN 0.01 % Solution INSTILL 1 DROP INTO EACH EYE AT BEDTIME      metoprolol SR (TOPROL XL) 25 MG TABLET SR 24 HR Take 2 Tablets by mouth 2 times a day. (Patient taking differently: Take 50 mg by mouth 2 times a day. Indications: takes 1/2 in am 1/2 pm) 120 Tablet 2    vitamin D3 2000 UNIT Tab Take 1 Tablet by mouth  every day. 60 Tablet     multivitamin (THERAGRAN) Tab Take 1 Tablet by mouth every day.      aspirin 81 MG EC tablet Take 1 tablet by mouth every day. 30 tablet 2    gabapentin (NEURONTIN) 100 MG Cap Take 1 Capsule by mouth at bedtime as needed (pain/insomnia). (Patient not taking: Reported on 2/28/2023) 30 Capsule 11     No current Epic-ordered facility-administered medications on file.       Health Maintenance: The patient states that he does not want to have a colonoscopy.  I emphasized the importance of colonoscopy, and the patient stated that he is a vegetarian, and does not think he can get colon cancer.  He again declined colonoscopy screening.    ROS:  Gen: no fevers/chills, no changes in weight  Eyes: no changes in vision  ENT: no changes in hearing  Pulm: no sob, no cough  CV: no chest pain, no palpitations  GI: no nausea/vomiting, no diarrhea  MSk: no myalgias  Skin: no rash  Neuro: no headaches, no numbness/tingling      Objective:     Exam:  /74   Pulse 80   Temp (!) 35.7 °C (96.2 °F)   Ht 1.829 m (6')   Wt 77.1 kg (170 lb)   SpO2 96%   BMI 23.06 kg/m²  Body mass index is 23.06 kg/m².    Gen: Alert and oriented, No apparent distress.  Neck: Neck is supple without lymphadenopathy.  Lungs: Normal effort, CTA bilaterally, no wheezes, rhonchi, or rales  CV: Regular rate and rhythm. No murmurs, rubs, or gallops.  Ext: No clubbing, cyanosis, edema.      Assessment & Plan:     57 y.o. male with the following -     Problem List Items Addressed This Visit       Acute ischemic stroke (HCC)     - I reinforced with the patient that he should get least 150 minutes of aerobic exercise per week  - Patient is also agreeable to continued to take his guideline directed medical therapy         Relevant Medications    atorvastatin (LIPITOR) 80 MG tablet    Uncontrolled type 2 diabetes mellitus with hyperglycemia (HCC)     - Patient will stop Glucotrol  - I counseled the patient to increase metformin to 1000 mg  twice daily; he may slowly uptitrate the dose, to avoid abdominal pain, nausea, diarrhea  - Patient will continue to take his Farxiga as directed  - Patient will follow-up in approximately 3 months, for A1c recheck and to discuss medication optimization-the patient's chart stated that he did not take a statin, but the patient is on 80 mg of atorvastatin daily         Relevant Medications    metFORMIN (GLUCOPHAGE) 500 MG Tab    Type 2 diabetes mellitus with other specified complication (HCC)    Relevant Medications    metFORMIN (GLUCOPHAGE) 500 MG Tab    Right sided cerebral hemisphere cerebrovascular accident (CVA) (HCC)    Relevant Medications    metFORMIN (GLUCOPHAGE) 500 MG Tab    atorvastatin (LIPITOR) 80 MG tablet         Return in about 3 months (around 5/28/2023).    Nadeem Rogers MD   PGY-3 Family Medicine Resident   [facility]

## 2023-03-06 ENCOUNTER — NON-PROVIDER VISIT (OUTPATIENT)
Dept: MEDICAL GROUP | Facility: PHYSICIAN GROUP | Age: 58
End: 2023-03-06
Payer: COMMERCIAL

## 2023-03-06 PROCEDURE — 99999 PR NO CHARGE: CPT | Performed by: FAMILY MEDICINE

## 2023-03-06 NOTE — PROGRESS NOTES
Patient Consult Note     They were not able to be seen today due to insurance issues.  The note was started in anticipation of seeing them next week.  They will contact his insurance to verify coverage we will reschedule appt     TIME IN: ---  TIME OUT: ---      Primary care physician: Donny Mcnally M.D.    Reason for consult: Management of Uncontrolled Type 2 Diabetes    HPI:  Israel Aviles is a 56 y.o. old patient who comes in today for evaluation of above stated problem.    Allergies:  Patient has no known allergies.    Most Recent labs, A1c, and immunizations:    Lab Results   Component Value Date/Time    HBA1C 9.2 (H) 02/15/2023 06:56 AM    HBA1C 8.5 (A) 11/08/2022 09:40 AM          Lab Results   Component Value Date/Time    CHOLSTRLTOT 241 (H) 02/15/2023 06:56 AM    CHOLSTRLTOT 137 02/17/2022 09:46 AM    LDL 65 02/17/2022 09:46 AM    HDL 54 02/15/2023 06:56 AM    HDL 43 02/17/2022 09:46 AM    TRIGLYCERIDE 142 02/15/2023 06:56 AM    TRIGLYCERIDE 143 02/17/2022 09:46 AM       Lab Results   Component Value Date/Time    SODIUM 141 02/15/2023 06:56 AM    SODIUM 138 03/02/2022 06:04 AM    POTASSIUM 4.5 02/15/2023 06:56 AM    POTASSIUM 4.1 03/02/2022 06:04 AM    CHLORIDE 103 02/15/2023 06:56 AM    CHLORIDE 101 03/02/2022 06:04 AM    CO2 24 02/15/2023 06:56 AM    CO2 26 03/02/2022 06:04 AM    GLUCOSE 173 (H) 02/15/2023 06:56 AM    GLUCOSE 134 (H) 03/02/2022 06:04 AM    BUN 14 02/15/2023 06:56 AM    BUN 18 03/02/2022 06:04 AM    CREATININE 0.86 02/15/2023 06:56 AM    CREATININE 0.74 03/02/2022 06:04 AM    CREATININE 0.9 05/09/2007 01:20 AM    BUNCREATRAT 16 02/15/2023 06:56 AM     Lab Results   Component Value Date/Time    ALKPHOSPHAT 88 02/15/2023 06:56 AM    ALKPHOSPHAT 92 02/25/2022 05:53 AM    ASTSGOT 18 02/15/2023 06:56 AM    ASTSGOT 31 02/25/2022 05:53 AM    ALTSGPT 16 02/15/2023 06:56 AM    ALTSGPT 30 02/25/2022 05:53 AM    TBILIRUBIN 0.3 02/15/2023 06:56 AM    TBILIRUBIN 0.3 02/25/2022 05:53 AM     INR 1.05 02/17/2022 09:46 AM    ALBUMIN 4.7 02/15/2023 06:56 AM    ALBUMIN 3.9 02/25/2022 05:53 AM      Lab Results   Component Value Date/Time    MALBCRT 40 (H) 03/04/2015 09:00 AM    MICROALBUR 4.7 03/04/2015 09:00 AM     Immunization History   Administered Date(s) Administered    Hep A/HEP B Combined Vaccine (TwinRix) 08/06/2021    Hepatitis B Vaccine (Adol/Adult) 06/09/2008    Influenza (IM) Preservative Free - HISTORICAL DATA 10/15/2010    Influenza Vac Subunit Quad Inj (Pf) 12/11/2021    Influenza Vaccine H1N1 - HISTORICAL DATA 02/27/2010    MMR Vaccine 06/09/2008    MODERNA SARS-COV-2 VACCINE (12+) 01/04/2022    PFIZER PURPLE CAP SARS-COV-2 VACCINATION (12+) 04/07/2021, 04/30/2021    TD Vaccine 06/09/2008    Varicella Vaccine Live 06/09/2008         Medications:    Current Outpatient Medications:     metFORMIN, 1,000 mg, Oral, BID WITH MEALS    atorvastatin, 80 mg, Oral, DAILY    dapagliflozin propanediol, 5 mg, Oral, DAILY    glipiZIDE SR, 10 mg, Oral, DAILY    clopidogrel, 75 mg, Oral, DAILY    gabapentin, 100 mg, Oral, QHS PRN (Patient not taking: Reported on 2/28/2023)    Lumigan, INSTILL 1 DROP INTO EACH EYE AT BEDTIME    metoprolol SR, 50 mg, Oral, BID (Patient taking differently: 50 mg, Oral, 2 TIMES DAILY, Indications: takes 1/2 in am 1/2 pm)    vitamin D3, 2,000 Units, Oral, DAILY    multivitamin, 1 Tablet, Oral, DAILY    aspirin, 81 mg, Oral, DAILY      History/ Failed Diabetic Medications if applicable:  jardiance low BP   Trulicity - due to constipation and low appetite     Preventative Management  BP regimen (ACE/ARB) - no  ASA - yes  Statin - lipitor   Last Retinal Scan - done   Monofilament/foot exam -BKA, sees podiatrist regularly.    Hypoglycemia awareness - yes  Nocturnal hypoglycemia- yes  Hypoglycemia:  Occasional    Physical Examination:  Vital signs: There were no vitals taken for this visit. There is no height or weight on file to calculate BMI.    Change in weight: Stable  Exercise  habits: minimal exercise   Diet: DASH diet    Assessment and Plan:  1. DM2   A1C above goal  He will likely need a GLP-1 and/or insulin to get A1c to goal   Medication(s) recommended:   Continue glipizide SR 10mg daily   Continue metformin 1000mg twice daily   Increase Farxiga 10mg daily       -Blood glucose and A1c target    However, more aggressive diabetic control is reasonable when tolerated.  Pt's treatment of Hypoglycemia. 15:15 Rule discussed with patient      2.  Cardiovascular  ldl are above goal   Medication(s) recommended.   Continue with Lipitor 80mg daily   Start Zetia 10mg daily       3.  Lifestyle changes   Focus on eating a DASH/Mediterranean-style diet.   Exercise 30 minutes daily at least 5 days/week, as tolerated.  https://www.niddk.nih.gov/bwp        Follow-up appointment in 6 week(s)    Jevon Barnard, PharmD, MS, BCACP, [facility]  Phone [phone] fax [phone]    This note was created using voice recognition software (Dragon). The accuracy of the dictation is limited by the abilities of the software. I have reviewed the note prior to signing, however some errors in grammar and context are still possible. If you have any questions related to this note please do not hesitate to contact our office.     CC:   Donny Mcnally M.D.  No ref. provider found  Dr. Jude Dallas

## 2023-03-13 ENCOUNTER — NON-PROVIDER VISIT (OUTPATIENT)
Dept: MEDICAL GROUP | Facility: PHYSICIAN GROUP | Age: 58
End: 2023-03-13
Payer: COMMERCIAL

## 2023-03-13 DIAGNOSIS — E11.69 TYPE 2 DIABETES MELLITUS WITH OTHER SPECIFIED COMPLICATION, WITHOUT LONG-TERM CURRENT USE OF INSULIN (HCC): ICD-10-CM

## 2023-03-13 PROCEDURE — 99999 PR NO CHARGE: CPT | Performed by: FAMILY MEDICINE

## 2023-03-15 NOTE — PROGRESS NOTES
2 RN skin check completed with Collette BRINK.  Devices in place: N/A  Wounds noted to left foot and left toes. Pictures taken, LDA opened, wound consult in place. No other skin issues to note.   The following interventions in place pillows for positioning, standby assist for ambulation with walker.    Statement Selected

## 2023-03-20 ENCOUNTER — HOSPITAL ENCOUNTER (OUTPATIENT)
Facility: MEDICAL CENTER | Age: 58
End: 2023-03-20
Attending: NURSE PRACTITIONER
Payer: COMMERCIAL

## 2023-03-20 ENCOUNTER — NON-PROVIDER VISIT (OUTPATIENT)
Dept: MEDICAL GROUP | Facility: PHYSICIAN GROUP | Age: 58
End: 2023-03-20
Payer: COMMERCIAL

## 2023-03-20 ENCOUNTER — APPOINTMENT (OUTPATIENT)
Dept: LAB | Facility: MEDICAL CENTER | Age: 58
End: 2023-03-20
Payer: COMMERCIAL

## 2023-03-20 DIAGNOSIS — E11.69 TYPE 2 DIABETES MELLITUS WITH OTHER SPECIFIED COMPLICATION, WITHOUT LONG-TERM CURRENT USE OF INSULIN (HCC): ICD-10-CM

## 2023-03-20 LAB
CREAT UR-MCNC: 70.6 MG/DL
MICROALBUMIN UR-MCNC: 1.9 MG/DL
MICROALBUMIN/CREAT UR: 27 MG/G (ref 0–30)

## 2023-03-20 PROCEDURE — 99401 PREV MED CNSL INDIV APPRX 15: CPT | Performed by: FAMILY MEDICINE

## 2023-03-20 PROCEDURE — 82043 UR ALBUMIN QUANTITATIVE: CPT

## 2023-03-20 PROCEDURE — 82570 ASSAY OF URINE CREATININE: CPT

## 2023-03-20 RX ORDER — GLIPIZIDE 2.5 MG/1
2.5-5 TABLET, EXTENDED RELEASE ORAL DAILY
Qty: 60 TABLET | Refills: 6 | Status: SHIPPED | OUTPATIENT
Start: 2023-03-20 | End: 2023-06-19 | Stop reason: SDUPTHER

## 2023-03-20 RX ORDER — DAPAGLIFLOZIN 10 MG/1
10 TABLET, FILM COATED ORAL DAILY
Qty: 90 TABLET | Refills: 1 | Status: SHIPPED | OUTPATIENT
Start: 2023-03-20 | End: 2023-09-25 | Stop reason: SDUPTHER

## 2023-03-20 NOTE — PROGRESS NOTES
Patient Consult Note       TIME IN: 1033am   TIME OUT: 1102am       Primary care physician: Donny Mcnally M.D.    Reason for consult: Management of Uncontrolled Type 2 Diabetes    HPI:  Israel Aviles is a 56 y.o. old patient who comes in today for evaluation of above stated problem.    Allergies:  Patient has no known allergies.    Most Recent labs, A1c, and immunizations:    Lab Results   Component Value Date/Time    HBA1C 9.2 (H) 02/15/2023 06:56 AM    HBA1C 8.5 (A) 11/08/2022 09:40 AM      Latest Reference Range & Units 02/15/23 06:56   LDL Chol Calc (NIH) 0 - 99 mg/dL 161 (H)   (H): Data is abnormally high         Lab Results   Component Value Date/Time    CHOLSTRLTOT 241 (H) 02/15/2023 06:56 AM    CHOLSTRLTOT 137 02/17/2022 09:46 AM    LDL 65 02/17/2022 09:46 AM    HDL 54 02/15/2023 06:56 AM    HDL 43 02/17/2022 09:46 AM    TRIGLYCERIDE 142 02/15/2023 06:56 AM    TRIGLYCERIDE 143 02/17/2022 09:46 AM       Lab Results   Component Value Date/Time    SODIUM 141 02/15/2023 06:56 AM    SODIUM 138 03/02/2022 06:04 AM    POTASSIUM 4.5 02/15/2023 06:56 AM    POTASSIUM 4.1 03/02/2022 06:04 AM    CHLORIDE 103 02/15/2023 06:56 AM    CHLORIDE 101 03/02/2022 06:04 AM    CO2 24 02/15/2023 06:56 AM    CO2 26 03/02/2022 06:04 AM    GLUCOSE 173 (H) 02/15/2023 06:56 AM    GLUCOSE 134 (H) 03/02/2022 06:04 AM    BUN 14 02/15/2023 06:56 AM    BUN 18 03/02/2022 06:04 AM    CREATININE 0.86 02/15/2023 06:56 AM    CREATININE 0.74 03/02/2022 06:04 AM    CREATININE 0.9 05/09/2007 01:20 AM    BUNCREATRAT 16 02/15/2023 06:56 AM     Lab Results   Component Value Date/Time    ALKPHOSPHAT 88 02/15/2023 06:56 AM    ALKPHOSPHAT 92 02/25/2022 05:53 AM    ASTSGOT 18 02/15/2023 06:56 AM    ASTSGOT 31 02/25/2022 05:53 AM    ALTSGPT 16 02/15/2023 06:56 AM    ALTSGPT 30 02/25/2022 05:53 AM    TBILIRUBIN 0.3 02/15/2023 06:56 AM    TBILIRUBIN 0.3 02/25/2022 05:53 AM    INR 1.05 02/17/2022 09:46 AM    ALBUMIN 4.7 02/15/2023 06:56 AM     ALBUMIN 3.9 02/25/2022 05:53 AM      Lab Results   Component Value Date/Time    MALBCRT 40 (H) 03/04/2015 09:00 AM    MICROALBUR 4.7 03/04/2015 09:00 AM     Immunization History   Administered Date(s) Administered    Hep A/HEP B Combined Vaccine (TwinRix) 08/06/2021    Hepatitis B Vaccine (Adol/Adult) 06/09/2008    Influenza (IM) Preservative Free - HISTORICAL DATA 10/15/2010    Influenza Vac Subunit Quad Inj (Pf) 12/11/2021    Influenza Vaccine H1N1 - HISTORICAL DATA 02/27/2010    MMR Vaccine 06/09/2008    MODERNA SARS-COV-2 VACCINE (12+) 01/04/2022    PFIZER PURPLE CAP SARS-COV-2 VACCINATION (12+) 04/07/2021, 04/30/2021    TD Vaccine 06/09/2008    Varicella Vaccine Live 06/09/2008         Medications:    Current Outpatient Medications:     metFORMIN, 1,000 mg, Oral, BID WITH MEALS    atorvastatin, 80 mg, Oral, DAILY    dapagliflozin propanediol, 5 mg, Oral, DAILY    glipiZIDE SR, 10 mg, Oral, DAILY    clopidogrel, 75 mg, Oral, DAILY    gabapentin, 100 mg, Oral, QHS PRN (Patient not taking: Reported on 2/28/2023)    Lumigan, INSTILL 1 DROP INTO EACH EYE AT BEDTIME    metoprolol SR, 50 mg, Oral, BID (Patient taking differently: 50 mg, Oral, 2 TIMES DAILY, Indications: takes 1/2 in am 1/2 pm)    vitamin D3, 2,000 Units, Oral, DAILY    multivitamin, 1 Tablet, Oral, DAILY    aspirin, 81 mg, Oral, DAILY      History/ Failed Diabetic Medications if applicable:  jardiance low BP   Trulicity - due to constipation and low appetite     Preventative Management  BP regimen (ACE/ARB) - no  ASA - yes  Statin - lipitor   Last Retinal Scan - done   Monofilament/foot exam -BKA, sees podiatrist regularly.    Hypoglycemia awareness - yes  Nocturnal hypoglycemia- yes  Hypoglycemia:  Occasional    Physical Examination:  Vital signs: There were no vitals taken for this visit. There is no height or weight on file to calculate BMI.    Change in weight: Stable  Exercise habits: minimal exercise   Diet: DASH diet    Assessment and Plan:  1.  DM2   A1C above goal  He will likely need a GLP-1 and/or insulin to get A1c to goal   He is taking glipizide prn and occasionally splitting the extended release pill.  I advised him not to split this pill, and to take it in the morning with food.  I prescribed him a lower dose of 2.5 mg once daily as requested  Medication(s) recommended:   Start glipizide SR 2.5mg daily   Continue metformin 1000mg twice daily   Increase Farxiga 10mg daily       -Blood glucose and A1c target    However, more aggressive diabetic control is reasonable when tolerated.  Pt's treatment of Hypoglycemia. 15:15 Rule discussed with patient      2.  Cardiovascular  ldl are above goal   They stopped taking Zetia 10 mg daily, will address at subsequent appointment  Medication(s) recommended.   Continue with Lipitor 80mg daily       3.  Lifestyle changes   Focus on eating a DASH/Mediterranean-style diet.   Exercise 30 minutes daily at least 5 days/week, as tolerated.  https://www.niddk.nih.gov/bwp        Follow-up appointment in 6 week(s)    Jevon Barnard, PharmD, MS, BCACP, [facility]  Phone [phone] fax [phone]    This note was created using voice recognition software (Dragon). The accuracy of the dictation is limited by the abilities of the software. I have reviewed the note prior to signing, however some errors in grammar and context are still possible. If you have any questions related to this note please do not hesitate to contact our office.     CC:   Donny Mcnally M.D.  No ref. provider found  Dr. Jude Dallas

## 2023-06-08 DIAGNOSIS — I25.5 ISCHEMIC CARDIOMYOPATHY: ICD-10-CM

## 2023-06-08 RX ORDER — METOPROLOL SUCCINATE 25 MG/1
TABLET, EXTENDED RELEASE ORAL
Qty: 180 TABLET | Refills: 3 | Status: SHIPPED | OUTPATIENT
Start: 2023-06-08 | End: 2023-11-24 | Stop reason: SDUPTHER

## 2023-06-15 ENCOUNTER — HOSPITAL ENCOUNTER (OUTPATIENT)
Dept: LAB | Facility: MEDICAL CENTER | Age: 58
End: 2023-06-15
Attending: NURSE PRACTITIONER
Payer: COMMERCIAL

## 2023-06-15 DIAGNOSIS — E11.69 TYPE 2 DIABETES MELLITUS WITH OTHER SPECIFIED COMPLICATION, WITHOUT LONG-TERM CURRENT USE OF INSULIN (HCC): ICD-10-CM

## 2023-06-15 LAB
ALBUMIN SERPL BCP-MCNC: 4.4 G/DL (ref 3.2–4.9)
ALBUMIN/GLOB SERPL: 1.3 G/DL
ALP SERPL-CCNC: 93 U/L (ref 30–99)
ALT SERPL-CCNC: 33 U/L (ref 2–50)
ANION GAP SERPL CALC-SCNC: 11 MMOL/L (ref 7–16)
AST SERPL-CCNC: 31 U/L (ref 12–45)
BILIRUB SERPL-MCNC: 0.4 MG/DL (ref 0.1–1.5)
BUN SERPL-MCNC: 12 MG/DL (ref 8–22)
CALCIUM ALBUM COR SERPL-MCNC: 9.6 MG/DL (ref 8.5–10.5)
CALCIUM SERPL-MCNC: 9.9 MG/DL (ref 8.5–10.5)
CHLORIDE SERPL-SCNC: 102 MMOL/L (ref 96–112)
CHOLEST SERPL-MCNC: 173 MG/DL (ref 100–199)
CO2 SERPL-SCNC: 27 MMOL/L (ref 20–33)
CREAT SERPL-MCNC: 0.89 MG/DL (ref 0.5–1.4)
CREAT UR-MCNC: 81.71 MG/DL
FASTING STATUS PATIENT QL REPORTED: NORMAL
GFR SERPLBLD CREATININE-BSD FMLA CKD-EPI: 100 ML/MIN/1.73 M 2
GLOBULIN SER CALC-MCNC: 3.4 G/DL (ref 1.9–3.5)
GLUCOSE SERPL-MCNC: 152 MG/DL (ref 65–99)
HDLC SERPL-MCNC: 48 MG/DL
LDLC SERPL CALC-MCNC: 103 MG/DL
MICROALBUMIN UR-MCNC: 1.6 MG/DL
MICROALBUMIN/CREAT UR: 20 MG/G (ref 0–30)
POTASSIUM SERPL-SCNC: 5.1 MMOL/L (ref 3.6–5.5)
PROT SERPL-MCNC: 7.8 G/DL (ref 6–8.2)
SODIUM SERPL-SCNC: 140 MMOL/L (ref 135–145)
TRIGL SERPL-MCNC: 112 MG/DL (ref 0–149)

## 2023-06-15 PROCEDURE — 80053 COMPREHEN METABOLIC PANEL: CPT

## 2023-06-15 PROCEDURE — 36415 COLL VENOUS BLD VENIPUNCTURE: CPT

## 2023-06-15 PROCEDURE — 82570 ASSAY OF URINE CREATININE: CPT

## 2023-06-15 PROCEDURE — 80061 LIPID PANEL: CPT

## 2023-06-15 PROCEDURE — 82043 UR ALBUMIN QUANTITATIVE: CPT

## 2023-06-15 PROCEDURE — 83036 HEMOGLOBIN GLYCOSYLATED A1C: CPT

## 2023-06-16 LAB
EST. AVERAGE GLUCOSE BLD GHB EST-MCNC: 220 MG/DL
HBA1C MFR BLD: 9.3 % (ref 4–5.6)

## 2023-06-19 ENCOUNTER — NON-PROVIDER VISIT (OUTPATIENT)
Dept: MEDICAL GROUP | Facility: PHYSICIAN GROUP | Age: 58
End: 2023-06-19
Payer: COMMERCIAL

## 2023-06-19 DIAGNOSIS — E11.69 TYPE 2 DIABETES MELLITUS WITH OTHER SPECIFIED COMPLICATION, WITHOUT LONG-TERM CURRENT USE OF INSULIN (HCC): ICD-10-CM

## 2023-06-19 PROCEDURE — 99402 PREV MED CNSL INDIV APPRX 30: CPT | Performed by: FAMILY MEDICINE

## 2023-06-19 RX ORDER — GLIPIZIDE 2.5 MG/1
2.5-5 TABLET, EXTENDED RELEASE ORAL DAILY
Qty: 60 TABLET | Refills: 6 | Status: SHIPPED | OUTPATIENT
Start: 2023-06-19 | End: 2024-03-21 | Stop reason: SDUPTHER

## 2023-06-19 NOTE — PROGRESS NOTES
Patient Consult Note       TIME IN: 918am   TIME OUT:  953am       Primary care physician: Donny Mcnally M.D.    Reason for consult: Management of Uncontrolled Type 2 Diabetes    HPI:  Israel Aviles is a 56 y.o. old patient who comes in today for evaluation of above stated problem.    Allergies:  Patient has no known allergies.    Most Recent labs, A1c, and immunizations:    Lab Results   Component Value Date/Time    HBA1C 9.3 (H) 06/15/2023 10:06 AM      Latest Reference Range & Units 02/15/23 06:56   LDL Chol Calc (NIH) 0 - 99 mg/dL 161 (H)   (H): Data is abnormally high         Lab Results   Component Value Date/Time    CHOLSTRLTOT 173 06/15/2023 10:06 AM     (H) 06/15/2023 10:06 AM    HDL 48 06/15/2023 10:06 AM    TRIGLYCERIDE 112 06/15/2023 10:06 AM       Lab Results   Component Value Date/Time    SODIUM 140 06/15/2023 10:06 AM    POTASSIUM 5.1 06/15/2023 10:06 AM    CHLORIDE 102 06/15/2023 10:06 AM    CO2 27 06/15/2023 10:06 AM    GLUCOSE 152 (H) 06/15/2023 10:06 AM    BUN 12 06/15/2023 10:06 AM    CREATININE 0.89 06/15/2023 10:06 AM    CREATININE 0.9 05/09/2007 01:20 AM    BUNCREATRAT 16 02/15/2023 06:56 AM     Lab Results   Component Value Date/Time    ALKPHOSPHAT 93 06/15/2023 10:06 AM    ASTSGOT 31 06/15/2023 10:06 AM    ALTSGPT 33 06/15/2023 10:06 AM    TBILIRUBIN 0.4 06/15/2023 10:06 AM    INR 1.05 02/17/2022 09:46 AM    ALBUMIN 4.4 06/15/2023 10:06 AM      Lab Results   Component Value Date/Time    MALBCRT 20 06/15/2023 10:06 AM    MICROALBUR 1.6 06/15/2023 10:06 AM     Immunization History   Administered Date(s) Administered    Hep A/HEP B Combined Vaccine (TwinRix) 08/06/2021    Hepatitis B Vaccine (Adol/Adult) 06/09/2008    Influenza (IM) Preservative Free - HISTORICAL DATA 10/15/2010    Influenza Vac Subunit Quad Inj (Pf) 12/11/2021    Influenza Vaccine H1N1 - HISTORICAL DATA 02/27/2010    MMR Vaccine 06/09/2008    MODERNA SARS-COV-2 VACCINE (12+) 01/04/2022    PFIZER PURPLE  CAP SARS-COV-2 VACCINATION (12+) 04/07/2021, 04/30/2021    TD Vaccine 06/09/2008    Varicella Vaccine Live 06/09/2008         Medications:    Current Outpatient Medications:     metoprolol SR, Take 1 tablet by mouth twice daily, or as directed.    dapagliflozin propanediol, 10 mg, Oral, DAILY    glipiZIDE SR, 2.5-5 mg, Oral, DAILY    metFORMIN, 1,000 mg, Oral, BID WITH MEALS    atorvastatin, 80 mg, Oral, DAILY    clopidogrel, 75 mg, Oral, DAILY    vitamin D3, 2,000 Units, Oral, DAILY    multivitamin, 1 Tablet, Oral, DAILY    aspirin, 81 mg, Oral, DAILY      History/ Failed Diabetic Medications if applicable:  jardiance low BP   Trulicity - due to constipation and low appetite     Preventative Management  BP regimen (ACE/ARB) - no  ASA - yes  Statin - lipitor   Last Retinal Scan - done   Monofilament/foot exam -VERONICA, sees podiatrist regularly.    Hypoglycemia awareness - yes  Nocturnal hypoglycemia- yes  Hypoglycemia:  Occasional    Physical Examination:  Vital signs: There were no vitals taken for this visit. There is no height or weight on file to calculate BMI.    Change in weight: Stable  Exercise habits: minimal exercise   Diet: DASH diet    Assessment and Plan:  1. DM2   A1C above goal  He will likely need a GLP-1 and/or insulin to get A1c to goal   He is taking glipizide prn and occasionally splitting the extended release pill.  I advised him not to split this pill, and to take it in the morning with food.  I prescribed him a lower dose of 2.5 mg once daily as requested  -180 in the AM   Day time <150-160  Next appt Ozempic then insulin and Actos if needed   Medication(s) recommended:   Restart glipizide,  Initial: 2.5 to 5 mg once daily with the first main meal, AM meal     Increase to  metformin 1000mg twice daily AM and PM      Continue Farxiga 10mg daily in the AM     -Blood glucose and A1c target    However, more aggressive diabetic control is reasonable when tolerated.  Pt's treatment of  Hypoglycemia. 15:15 Rule discussed with patient      2.  Cardiovascular  ldl are above goal   They stopped taking Zetia 10 mg daily, will address at subsequent appointment, declined today.   Medication(s) recommended.   Continue with Lipitor 80mg daily       3.  Lifestyle changes   Focus on eating a DASH/Mediterranean-style diet.   Exercise 30 minutes daily at least 5 days/week, as tolerated.  https://www.niddk.nih.gov/bwp        Follow-up appointment in 6 week(s)    Jevon Barnard, PharmD, MS, BCACP, C  [facility]  Phone [phone] fax [phone]    This note was created using voice recognition software (Dragon). The accuracy of the dictation is limited by the abilities of the software. I have reviewed the note prior to signing, however some errors in grammar and context are still possible. If you have any questions related to this note please do not hesitate to contact our office.     CC:   Donny Mcnally M.D.  No ref. provider found  Dr. Jude Dallas

## 2023-06-19 NOTE — ADDENDUM NOTE
Addended by: NILS RODRIGUEZ on: 6/19/2023 09:38 AM     Modules accepted: Orders, Level of Service     DISPLAY PLAN FREE TEXT

## 2023-06-19 NOTE — PATIENT INSTRUCTIONS
Restart glipizide,  Initial: 2.5 to 5 mg once daily with the first main meal, AM meal     Increase to  metformin 1000mg twice daily AM and PM      Continue Farxiga 10mg daily in the AM

## 2023-07-06 ENCOUNTER — APPOINTMENT (OUTPATIENT)
Dept: MEDICAL GROUP | Facility: CLINIC | Age: 58
End: 2023-07-06
Payer: COMMERCIAL

## 2023-09-15 ENCOUNTER — HOSPITAL ENCOUNTER (OUTPATIENT)
Dept: LAB | Facility: MEDICAL CENTER | Age: 58
End: 2023-09-15
Attending: NURSE PRACTITIONER
Payer: COMMERCIAL

## 2023-09-15 DIAGNOSIS — E11.69 TYPE 2 DIABETES MELLITUS WITH OTHER SPECIFIED COMPLICATION, WITHOUT LONG-TERM CURRENT USE OF INSULIN (HCC): ICD-10-CM

## 2023-09-15 LAB
ALBUMIN SERPL BCP-MCNC: 4.3 G/DL (ref 3.2–4.9)
ALBUMIN/GLOB SERPL: 1.3 G/DL
ALP SERPL-CCNC: 81 U/L (ref 30–99)
ALT SERPL-CCNC: 11 U/L (ref 2–50)
ANION GAP SERPL CALC-SCNC: 12 MMOL/L (ref 7–16)
AST SERPL-CCNC: 15 U/L (ref 12–45)
BILIRUB SERPL-MCNC: 0.4 MG/DL (ref 0.1–1.5)
BUN SERPL-MCNC: 20 MG/DL (ref 8–22)
CALCIUM ALBUM COR SERPL-MCNC: 9.3 MG/DL (ref 8.5–10.5)
CALCIUM SERPL-MCNC: 9.5 MG/DL (ref 8.5–10.5)
CHLORIDE SERPL-SCNC: 102 MMOL/L (ref 96–112)
CHOLEST SERPL-MCNC: 230 MG/DL (ref 100–199)
CO2 SERPL-SCNC: 24 MMOL/L (ref 20–33)
CREAT SERPL-MCNC: 0.94 MG/DL (ref 0.5–1.4)
EST. AVERAGE GLUCOSE BLD GHB EST-MCNC: 197 MG/DL
FASTING STATUS PATIENT QL REPORTED: NORMAL
GFR SERPLBLD CREATININE-BSD FMLA CKD-EPI: 94 ML/MIN/1.73 M 2
GLOBULIN SER CALC-MCNC: 3.4 G/DL (ref 1.9–3.5)
GLUCOSE SERPL-MCNC: 158 MG/DL (ref 65–99)
HBA1C MFR BLD: 8.5 % (ref 4–5.6)
HDLC SERPL-MCNC: 48 MG/DL
LDLC SERPL CALC-MCNC: 148 MG/DL
POTASSIUM SERPL-SCNC: 4.4 MMOL/L (ref 3.6–5.5)
PROT SERPL-MCNC: 7.7 G/DL (ref 6–8.2)
SODIUM SERPL-SCNC: 138 MMOL/L (ref 135–145)
TRIGL SERPL-MCNC: 171 MG/DL (ref 0–149)

## 2023-09-15 PROCEDURE — 36415 COLL VENOUS BLD VENIPUNCTURE: CPT

## 2023-09-15 PROCEDURE — 80061 LIPID PANEL: CPT

## 2023-09-15 PROCEDURE — 80053 COMPREHEN METABOLIC PANEL: CPT

## 2023-09-15 PROCEDURE — 83036 HEMOGLOBIN GLYCOSYLATED A1C: CPT

## 2023-09-18 NOTE — DISCHARGE PLANNING
"Insurance remains pending.  Dr. Martínez has medically cleared.  Spoke with Terrance \"Augustina\" spouse regarding [facility] and D/C resources/support.  She is agreeable with an admission.  They live in a 1LV home with 1ST to enter.  Augustina works from [id number] 7 days/week. She does not have the option of taking any FMLA.  She states that there are no other family/friends that are able to assist.     1010-Insurance has authorized.  Case is under review by Dr. Bai.  Unfortunately, I do not have a bed available today.  Will set up transport for tomorrow if Dr. Bai accepts.    1324-Dr. Bai has accepted.  Paramjit ADAIR, is looking into transport for tomorrow.    1512- Transport arranged for 1130 tomorrow 2/24 to [facility] via [facility]. Notified bedside nurse, attending, cm, and left message for wife Augustina. TCC to remain available.  " Pain Refusal Text: I offered to prescribe pain medication but the patient refused to take this medication.

## 2023-09-25 ENCOUNTER — NON-PROVIDER VISIT (OUTPATIENT)
Dept: MEDICAL GROUP | Facility: PHYSICIAN GROUP | Age: 58
End: 2023-09-25
Payer: COMMERCIAL

## 2023-09-25 DIAGNOSIS — E11.69 TYPE 2 DIABETES MELLITUS WITH OTHER SPECIFIED COMPLICATION, WITHOUT LONG-TERM CURRENT USE OF INSULIN (HCC): ICD-10-CM

## 2023-09-25 PROCEDURE — 99401 PREV MED CNSL INDIV APPRX 15: CPT | Performed by: FAMILY MEDICINE

## 2023-09-25 RX ORDER — DAPAGLIFLOZIN 10 MG/1
10 TABLET, FILM COATED ORAL DAILY
Qty: 90 TABLET | Refills: 1 | Status: SHIPPED | OUTPATIENT
Start: 2023-09-25 | End: 2024-01-04 | Stop reason: SDUPTHER

## 2023-09-25 RX ORDER — EZETIMIBE 10 MG/1
10 TABLET ORAL
Qty: 90 TABLET | Refills: 1 | Status: SHIPPED | OUTPATIENT
Start: 2023-09-25 | End: 2023-12-08 | Stop reason: SDUPTHER

## 2023-09-25 NOTE — PROGRESS NOTES
Patient Consult Note       TIME IN: 941am   TIME OUT:  1009am      Primary care physician: Donny Mcnally M.D.    Reason for consult: Management of Uncontrolled Type 2 Diabetes    HPI:  Israel Aviles is a 56 y.o. old patient who comes in today for evaluation of above stated problem.    Allergies:  Patient has no known allergies.    Most Recent labs, A1c, and immunizations:    Lab Results   Component Value Date/Time    HBA1C 8.5 (H) 09/15/2023 09:49 AM      Latest Reference Range & Units 02/15/23 06:56   LDL Chol Calc (NIH) 0 - 99 mg/dL 161 (H)   (H): Data is abnormally high         Lab Results   Component Value Date/Time    CHOLSTRLTOT 230 (H) 09/15/2023 09:49 AM     (H) 09/15/2023 09:49 AM    HDL 48 09/15/2023 09:49 AM    TRIGLYCERIDE 171 (H) 09/15/2023 09:49 AM       Lab Results   Component Value Date/Time    SODIUM 138 09/15/2023 09:49 AM    POTASSIUM 4.4 09/15/2023 09:49 AM    CHLORIDE 102 09/15/2023 09:49 AM    CO2 24 09/15/2023 09:49 AM    GLUCOSE 158 (H) 09/15/2023 09:49 AM    BUN 20 09/15/2023 09:49 AM    CREATININE 0.94 09/15/2023 09:49 AM    CREATININE 0.9 05/09/2007 01:20 AM    BUNCREATRAT 16 02/15/2023 06:56 AM     Lab Results   Component Value Date/Time    ALKPHOSPHAT 81 09/15/2023 09:49 AM    ASTSGOT 15 09/15/2023 09:49 AM    ALTSGPT 11 09/15/2023 09:49 AM    TBILIRUBIN 0.4 09/15/2023 09:49 AM    INR 1.05 02/17/2022 09:46 AM    ALBUMIN 4.3 09/15/2023 09:49 AM      Lab Results   Component Value Date/Time    MALBCRT 20 06/15/2023 10:06 AM    MICROALBUR 1.6 06/15/2023 10:06 AM     Immunization History   Administered Date(s) Administered    Hep A/HEP B Combined Vaccine (TwinRix) 08/06/2021    Hepatitis B Vaccine (Adol/Adult) 06/09/2008    Influenza (IM) Preservative Free - HISTORICAL DATA 10/15/2010    Influenza Vac Subunit Quad Inj (Pf) 12/11/2021    Influenza Vaccine H1N1 - HISTORICAL DATA 02/27/2010    MMR Vaccine 06/09/2008    MODERNA SARS-COV-2 VACCINE (12+) 01/04/2022    PFIZER  PURPLE CAP SARS-COV-2 VACCINATION (12+) 04/07/2021, 04/30/2021    TD Vaccine 06/09/2008    Varicella Vaccine Live 06/09/2008         Medications:    Current Outpatient Medications:     ezetimibe, 10 mg, Oral, QHS    dapagliflozin propanediol, 10 mg, Oral, DAILY    glipiZIDE SR, 2.5-5 mg, Oral, DAILY (Patient taking differently: 5 mg, Oral, DAILY)    metoprolol SR, Take 1 tablet by mouth twice daily, or as directed.    metFORMIN, 1,000 mg, Oral, BID WITH MEALS    atorvastatin, 80 mg, Oral, DAILY    clopidogrel, 75 mg, Oral, DAILY    vitamin D3, 2,000 Units, Oral, DAILY    multivitamin, 1 Tablet, Oral, DAILY    aspirin, 81 mg, Oral, DAILY      History/ Failed Diabetic Medications if applicable:  jardiance low BP   Trulicity - due to constipation and low appetite     Preventative Management  BP regimen (ACE/ARB) - no  ASA - yes  Statin - lipitor   Last Retinal Scan - done   Monofilament/foot exam -BKA, sees podiatrist regularly.    Hypoglycemia awareness - yes  Nocturnal hypoglycemia- yes  Hypoglycemia:  Occasional    Physical Examination:  Vital signs: There were no vitals taken for this visit. There is no height or weight on file to calculate BMI.    Change in weight: Stable  Exercise habits: minimal exercise   Diet: DASH diet    Assessment and Plan:  1. DM2   A1C above goal  He will likely need a GLP-1 and/or insulin to get A1c to goal   He is taking glipizide prn and occasionally splitting the extended release pill.  I advised him not to split this pill, and to take it in the morning with food.  I prescribed him a lower dose of 2.5 mg once daily as requested  -180 in the AM   Day time <150-160  Next appt Ozempic then insulin and Actos if needed   Januvia or Rybelsus at next appt   Medication(s) recommended:   MOVE glipizide TO AM, AND increase to 5 mg once daily with the first main meal, AM meal     Increase to metformin 1000mg twice daily AM and PM      Continue Farxiga 10mg daily in the AM     -Blood  glucose and A1c target    However, more aggressive diabetic control is reasonable when tolerated.  Pt's treatment of Hypoglycemia. 15:15 Rule discussed with patient      2.  Cardiovascular  ldl are above goal  of <70 with ASCVD.   Medication(s) recommended.   Continue with Lipitor 80mg daily   Start Zetia 10mg daily       3.  Lifestyle changes   Focus on eating a DASH/Mediterranean-style diet.   Exercise 30 minutes daily at least 5 days/week, as tolerated.  https://www.niddk.nih.gov/bwp        Follow-up appointment in 6 week(s)    Jevon Barnard, PharmD, MS, BCACP, C  [facility]  Phone [phone] fax [phone]    This note was created using voice recognition software (Dragon). The accuracy of the dictation is limited by the abilities of the software. I have reviewed the note prior to signing, however some errors in grammar and context are still possible. If you have any questions related to this note please do not hesitate to contact our office.     CC:   Donny Mcnally M.D.  No ref. provider found  Dr. Jude Dallas

## 2023-09-28 ENCOUNTER — DOCUMENTATION (OUTPATIENT)
Dept: VASCULAR LAB | Facility: MEDICAL CENTER | Age: 58
End: 2023-09-28
Payer: COMMERCIAL

## 2023-09-28 NOTE — PROGRESS NOTES
Submitted PA for Ezetimibe 10MG tablets in [employer].    (Key: [id number]) - [id number]  Status: PA Response - ApprovedCreated: September 25th, 2023 [id number]: September 28th, 2023    Althea Sheridan, Med Ass't  [facility]  Ph. [phone]  Fx. [phone]

## 2023-10-07 DIAGNOSIS — Z79.4 TYPE 2 DIABETES MELLITUS WITH OTHER SPECIFIED COMPLICATION, WITH LONG-TERM CURRENT USE OF INSULIN (HCC): ICD-10-CM

## 2023-10-07 DIAGNOSIS — E11.69 TYPE 2 DIABETES MELLITUS WITH OTHER SPECIFIED COMPLICATION, WITH LONG-TERM CURRENT USE OF INSULIN (HCC): ICD-10-CM

## 2023-10-31 ENCOUNTER — APPOINTMENT (OUTPATIENT)
Dept: CARDIOLOGY | Facility: MEDICAL CENTER | Age: 58
End: 2023-10-31
Payer: COMMERCIAL

## 2023-11-03 ENCOUNTER — OFFICE VISIT (OUTPATIENT)
Dept: MEDICAL GROUP | Facility: OTHER | Age: 58
End: 2023-11-03
Payer: COMMERCIAL

## 2023-11-03 VITALS
HEART RATE: 83 BPM | TEMPERATURE: 96.5 F | WEIGHT: 174 LBS | BODY MASS INDEX: 23.57 KG/M2 | RESPIRATION RATE: 16 BRPM | HEIGHT: 72 IN | OXYGEN SATURATION: 97 % | DIASTOLIC BLOOD PRESSURE: 68 MMHG | SYSTOLIC BLOOD PRESSURE: 100 MMHG

## 2023-11-03 DIAGNOSIS — Z23 NEED FOR VACCINATION: ICD-10-CM

## 2023-11-03 DIAGNOSIS — Z79.4 TYPE 2 DIABETES MELLITUS WITH OTHER SPECIFIED COMPLICATION, WITH LONG-TERM CURRENT USE OF INSULIN (HCC): ICD-10-CM

## 2023-11-03 DIAGNOSIS — E78.2 MIXED HYPERLIPIDEMIA: ICD-10-CM

## 2023-11-03 DIAGNOSIS — E11.69 TYPE 2 DIABETES MELLITUS WITH OTHER SPECIFIED COMPLICATION, WITH LONG-TERM CURRENT USE OF INSULIN (HCC): ICD-10-CM

## 2023-11-03 DIAGNOSIS — I73.9 PVD (PERIPHERAL VASCULAR DISEASE) (HCC): ICD-10-CM

## 2023-11-03 DIAGNOSIS — Z00.00 ENCOUNTER FOR PREVENTIVE CARE: ICD-10-CM

## 2023-11-03 DIAGNOSIS — I25.10 CORONARY ARTERY DISEASE DUE TO LIPID RICH PLAQUE: ICD-10-CM

## 2023-11-03 DIAGNOSIS — E11.9 DIABETES MELLITUS (HCC): ICD-10-CM

## 2023-11-03 DIAGNOSIS — I25.83 CORONARY ARTERY DISEASE DUE TO LIPID RICH PLAQUE: ICD-10-CM

## 2023-11-03 DIAGNOSIS — S88.119A BELOW-KNEE AMPUTATION (HCC): ICD-10-CM

## 2023-11-03 PROCEDURE — 90677 PCV20 VACCINE IM: CPT | Performed by: FAMILY MEDICINE

## 2023-11-03 PROCEDURE — 99214 OFFICE O/P EST MOD 30 MIN: CPT | Mod: 25 | Performed by: FAMILY MEDICINE

## 2023-11-03 PROCEDURE — 90471 IMMUNIZATION ADMIN: CPT | Performed by: FAMILY MEDICINE

## 2023-11-03 PROCEDURE — 3078F DIAST BP <80 MM HG: CPT | Performed by: FAMILY MEDICINE

## 2023-11-03 PROCEDURE — 90472 IMMUNIZATION ADMIN EACH ADD: CPT | Performed by: FAMILY MEDICINE

## 2023-11-03 PROCEDURE — 3074F SYST BP LT 130 MM HG: CPT | Performed by: FAMILY MEDICINE

## 2023-11-03 PROCEDURE — 90746 HEPB VACCINE 3 DOSE ADULT IM: CPT | Performed by: FAMILY MEDICINE

## 2023-11-03 ASSESSMENT — PATIENT HEALTH QUESTIONNAIRE - PHQ9: CLINICAL INTERPRETATION OF PHQ2 SCORE: 0

## 2023-11-03 ASSESSMENT — FIBROSIS 4 INDEX: FIB4 SCORE: 1.6

## 2023-11-03 NOTE — ASSESSMENT & PLAN NOTE
Continue maximal medical therapy.  Unable to tolerate ACE, Arb.   Cardiology visit has been rescheduled.   Offered Pneumonia vaccine today but he is unsure about it; declines influenza vaccine.

## 2023-11-03 NOTE — ASSESSMENT & PLAN NOTE
High risk with prior MI, Stroke.   Continue high dose statin and Ezetimibe, PLavix and aspirin.   Healthful diet reviewed.

## 2023-11-03 NOTE — PATIENT INSTRUCTIONS
PT referral placed for therapy.    Cardiology appt pending for December.     Ophthalmology referral placed for Dr Reveles,.

## 2023-11-03 NOTE — ASSESSMENT & PLAN NOTE
Recent labs of 9/15 are reviewed.  A1c 8.5%, .  On maximal statin dose, and Zetia was added recently.   Continue current medication regimen.   May refill his maintenance medications for one year as needed.    Has upcoming Ophthalmology appointment pending with Dr Reveles.

## 2023-11-03 NOTE — PROGRESS NOTES
[facility] [place] [facility]     PATIENT ID:  NAME:  Israel Aviles  MRN:               [id number]  YOB: 1965    Date: 8:51 AM      CC:  diabetes, heart check      HPI: Israel Aviles is a 58 y.o. male who presented with these concerns:     Problem   Encounter for Preventive Care    Reviewed vaccines with him today.      Type 2 Diabetes Mellitus With Other Specified Complication (Hcc)    Has been able to get in with Endocrinology , saw Dr Gallagher.  Did not tolerate carlos eof the newer injectable medications.  He is interested in subcutaneous glucose monitoring.  Has had difficult to control diabetes for some years.  Has had a prior MI and a stroke in February, 2022.  Was taken off insulin in the past year and a half.  Has a left lower extremity amputation.  Testing his sugars every day.  Overall, last A1c was 8.5% and things seem better now for him.      Cad (Coronary Artery Disease)    Has known cardiac disease, and a past history of MI and was a candidate for CABG in past.  Has risk factors of Diabetes and Dyslipidemia, PAD, prior Stroke.  His Cardiology visits have been rescheduled twice in recent months and is pending for 12.2023.  Denies any chest pain, dyspnea.      Pvd (Peripheral Vascular Disease) (Hcc)    Prior amputation left lower limb.  On aggressive medical therapy.      Hyperlipidemia    He is taking high dose Atorvastatin daily plus Ezetimibe.  On Plavix and aspirin daily.  Watches his diet carefully.  Mostly eats a plant-based whole food diet.  Keeps active.          REVIEW OF SYSTEMS:   Ten systems reviewed and were negative except as noted in the HPI.                PROBLEM LIST  Patient Active Problem List   Diagnosis    Acute ischemic stroke (HCC)    Uncontrolled type 2 diabetes mellitus with hyperglycemia (HCC)    Hyperlipidemia    Wound infection    PVD (peripheral vascular disease) (HCC)    Diabetic foot ulcer (HCC)    Diabetic polyneuropathy associated with type 2 diabetes mellitus  (HCC)    Pressure injury of deep tissue of left foot    Diabetic ulcer of toe of left foot associated with type 2 diabetes mellitus, with necrosis of muscle (HCC)    Osteomyelitis of left foot (HCC)    Diabetic foot (HCC)    Vitamin D deficiency    CAD (coronary artery disease)    HTN (hypertension)    Orthostatic hypotension    Non-healing wound of right heel    Below-knee amputation (HCC)    Adjustment disorder with depressed mood    HFrEF (heart failure with reduced ejection fraction) (HCC)    Ischemic cardiomyopathy    Body mass index (BMI) 23.0-23.9, adult    Type 2 diabetes mellitus with other specified complication (HCC)    TIA (transient ischemic attack)    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)    Dyslipidemia due to type 2 diabetes mellitus (HCC)    Colon cancer screening declined    Neurological deficit present    Stroke-like symptoms    Anemia    Right sided cerebral hemisphere cerebrovascular accident (CVA) (HCC)    Late effect of stroke    Drug therapy    Anxiety    Diabetes mellitus (HCC)    Mixed hyperlipidemia    Encounter for preventive care        PAST SURGICAL HISTORY:  Past Surgical History:   Procedure Laterality Date    KNEE AMPUTATION BELOW Left 12/20/2019    Procedure: AMPUTATION, BELOW KNEE;  Surgeon: Koby Zhao M.D.;  Location: SURGERY [facility];  Service: Orthopedics    [facility] CARDIAC CATH  12/16/2019    multi-vessel disease    IRRIGATION & DEBRIDEMENT ORTHO Left 6/24/2019    Procedure: IRRIGATION AND DEBRIDEMENT, WOUND-FOOT, BIOLOGIC PLACEMENT, WOUND VAC PLACEMENT;  Surgeon: Charles Chopra M.D.;  Location: SURGERY [facility];  Service: Orthopedics       FAMILY HISTORY:  Family History   Problem Relation Age of Onset    Diabetes Mother     Diabetes Father        SOCIAL HISTORY:   Social History     Tobacco Use    Smoking status: Never    Smokeless tobacco: Never   Substance Use Topics    Alcohol use: No       ALLERGIES:  No Known  Allergies    OUTPATIENT MEDICATIONS:    Current Outpatient Medications:     ezetimibe (ZETIA) 10 MG Tab, Take 1 Tablet by mouth at bedtime., Disp: 90 Tablet, Rfl: 1    dapagliflozin propanediol (FARXIGA) 10 MG Tab, Take 1 Tablet by mouth every day., Disp: 90 Tablet, Rfl: 1    glipiZIDE SR (GLUCOTROL) 2.5 MG TABLET SR 24 HR, Take 1-2 Tablets by mouth every day. Pharmacy, DC any other glipizide RXs. (Patient taking differently: Take 5 mg by mouth every day.), Disp: 60 Tablet, Rfl: 6    metoprolol SR (TOPROL XL) 25 MG TABLET SR 24 HR, Take 1 tablet by mouth twice daily, or as directed., Disp: 180 Tablet, Rfl: 3    metFORMIN (GLUCOPHAGE) 500 MG Tab, Take 2 Tablets by mouth 2 times a day with meals., Disp: 360 Tablet, Rfl: 1    atorvastatin (LIPITOR) 80 MG tablet, Take 80 mg by mouth every day. FOR 90 DAYS, Disp: , Rfl:     clopidogrel (PLAVIX) 75 MG Tab, Take 1 Tablet by mouth every day., Disp: 90 Tablet, Rfl: 3    vitamin D3 2000 UNIT Tab, Take 1 Tablet by mouth every day., Disp: 60 Tablet, Rfl:     multivitamin (THERAGRAN) Tab, Take 1 Tablet by mouth every day., Disp: , Rfl:     aspirin 81 MG EC tablet, Take 1 tablet by mouth every day., Disp: 30 tablet, Rfl: 2    PHYSICAL EXAM:  Vitals:    11/03/23 0808   BP: 100/68   BP Location: Left arm   Patient Position: Sitting   BP Cuff Size: Adult   Pulse: 83   Resp: 16   Temp: 35.8 °C (96.5 °F)   TempSrc: Temporal   SpO2: 97%   Weight: 78.9 kg (174 lb)   Height: 1.829 m (6')       General: Pt resting in NAD, cooperative   Skin:  Pink, warm and dry.  HEENT: NC/AT. EOMI.  Lungs:  Symmetrical.  CTAB, good air movement.  Cardiovascular:  S1/S2 RRR without murmur or gallop. No carotid bruits.   Extremities:  Full range of motion. Left lower leg amputation.  CNS:  Muscle tone is normal. No gross focal neurologic deficits. Ambulatory.       ASSESSMENT/PLAN:   58 y.o. male     Problem List Items Addressed This Visit       CAD (coronary artery disease)     Continue maximal medical  therapy.  Unable to tolerate ACE, Arb.   Cardiology visit has been rescheduled.   Offered Pneumonia vaccine today but he is unsure about it; declines influenza vaccine.          Diabetes mellitus (HCC)    Relevant Orders    Referral to Ophthalmology    Encounter for preventive care     Prevnar-20 given today.  Hep B 3rd vaccine given.   Tdap unavailable today for administration.   Offered Flu vaccine but he declines.          Hyperlipidemia     High risk with prior MI, Stroke.   Continue high dose statin and Ezetimibe, PLavix and aspirin.   Healthful diet reviewed.            PVD (peripheral vascular disease) (HCC)     Aspirin, Plavix, statin and Ezetimibe.   Plant based diet reviewed.          Type 2 diabetes mellitus with other specified complication (HCC)     Recent labs of 9/15 are reviewed.  A1c 8.5%, .  On maximal statin dose, and Zetia was added recently.   Continue current medication regimen.   May refill his maintenance medications for one year as needed.    Has upcoming Ophthalmology appointment pending with Dr Reveles.              Relevant Orders    Referral to Ophthalmology     Other Visit Diagnoses       Need for vaccination        Relevant Orders    Hepatitis B Vaccine Adult 20+    Pneumococcal Conjugate Vaccine 20-Valent (6 wks+)          Recheck 4-6 months.     Donny Mcnally MD  [facility]

## 2023-11-03 NOTE — ASSESSMENT & PLAN NOTE
Prevnar-20 given today.  Hep B 3rd vaccine given.   Tdap unavailable today for administration.   Offered Flu vaccine but he declines.

## 2023-11-16 NOTE — DISCHARGE INSTRUCTIONS
Pt was at urgent care tues for a cough , pt states last night fever, chills, , headache , cough is worse. Pt also noted chest palpitations. Pt denies n/v/d. Pt states sob last night .    Your lab test still shows no signs of anemia, or bad infection.  Your lecture lites are normal your blood sugar is 197 on the blood test.  Please go home and continue all your previous care.

## 2023-11-24 DIAGNOSIS — I25.5 ISCHEMIC CARDIOMYOPATHY: ICD-10-CM

## 2023-11-29 ENCOUNTER — TELEPHONE (OUTPATIENT)
Dept: CARDIOLOGY | Facility: MEDICAL CENTER | Age: 58
End: 2023-11-29
Payer: COMMERCIAL

## 2023-11-29 DIAGNOSIS — E11.69 TYPE 2 DIABETES MELLITUS WITH OTHER SPECIFIED COMPLICATION, WITHOUT LONG-TERM CURRENT USE OF INSULIN (HCC): ICD-10-CM

## 2023-11-29 DIAGNOSIS — I63.9 RIGHT SIDED CEREBRAL HEMISPHERE CEREBROVASCULAR ACCIDENT (CVA) (HCC): ICD-10-CM

## 2023-11-29 RX ORDER — METOPROLOL SUCCINATE 25 MG/1
TABLET, EXTENDED RELEASE ORAL
Qty: 180 TABLET | Refills: 3 | Status: SHIPPED | OUTPATIENT
Start: 2023-11-29 | End: 2023-12-08 | Stop reason: SDUPTHER

## 2023-12-08 ENCOUNTER — OFFICE VISIT (OUTPATIENT)
Dept: CARDIOLOGY | Facility: MEDICAL CENTER | Age: 58
End: 2023-12-08
Payer: COMMERCIAL

## 2023-12-08 VITALS
HEIGHT: 72 IN | OXYGEN SATURATION: 97 % | RESPIRATION RATE: 16 BRPM | WEIGHT: 172 LBS | HEART RATE: 88 BPM | BODY MASS INDEX: 23.3 KG/M2 | SYSTOLIC BLOOD PRESSURE: 116 MMHG | DIASTOLIC BLOOD PRESSURE: 60 MMHG

## 2023-12-08 DIAGNOSIS — I50.20 HFREF (HEART FAILURE WITH REDUCED EJECTION FRACTION) (HCC): ICD-10-CM

## 2023-12-08 DIAGNOSIS — I25.5 ISCHEMIC CARDIOMYOPATHY: ICD-10-CM

## 2023-12-08 DIAGNOSIS — E78.2 MIXED HYPERLIPIDEMIA: ICD-10-CM

## 2023-12-08 DIAGNOSIS — I25.10 CORONARY ARTERY DISEASE DUE TO LIPID RICH PLAQUE: ICD-10-CM

## 2023-12-08 DIAGNOSIS — I63.9 RIGHT SIDED CEREBRAL HEMISPHERE CEREBROVASCULAR ACCIDENT (CVA) (HCC): ICD-10-CM

## 2023-12-08 DIAGNOSIS — I25.83 CORONARY ARTERY DISEASE DUE TO LIPID RICH PLAQUE: ICD-10-CM

## 2023-12-08 DIAGNOSIS — I10 PRIMARY HYPERTENSION: ICD-10-CM

## 2023-12-08 PROBLEM — E11.8 DIABETIC FOOT (HCC): Status: RESOLVED | Noted: 2019-08-21 | Resolved: 2023-12-08

## 2023-12-08 PROBLEM — L08.9 WOUND INFECTION: Status: RESOLVED | Noted: 2019-05-24 | Resolved: 2023-12-08

## 2023-12-08 PROBLEM — E11.9 DIABETES MELLITUS (HCC): Status: RESOLVED | Noted: 2022-06-22 | Resolved: 2023-12-08

## 2023-12-08 PROBLEM — R29.90 STROKE-LIKE SYMPTOMS: Status: RESOLVED | Noted: 2022-02-18 | Resolved: 2023-12-08

## 2023-12-08 PROBLEM — E11.69 DISORDER OF NERVOUS SYSTEM DUE TO TYPE 2 DIABETES MELLITUS (HCC): Status: RESOLVED | Noted: 2021-11-23 | Resolved: 2023-12-08

## 2023-12-08 PROBLEM — E78.5 HYPERLIPIDEMIA: Status: RESOLVED | Noted: 2019-05-14 | Resolved: 2023-12-08

## 2023-12-08 PROBLEM — E78.5 DYSLIPIDEMIA DUE TO TYPE 2 DIABETES MELLITUS (HCC): Status: RESOLVED | Noted: 2020-06-18 | Resolved: 2023-12-08

## 2023-12-08 PROBLEM — E11.621 DIABETIC ULCER OF TOE OF LEFT FOOT ASSOCIATED WITH TYPE 2 DIABETES MELLITUS, WITH NECROSIS OF MUSCLE (HCC): Status: RESOLVED | Noted: 2019-07-30 | Resolved: 2023-12-08

## 2023-12-08 PROBLEM — E11.621 DIABETIC FOOT ULCER (HCC): Status: RESOLVED | Noted: 2019-07-07 | Resolved: 2023-12-08

## 2023-12-08 PROBLEM — E11.69 TYPE 2 DIABETES MELLITUS WITH OTHER SPECIFIED COMPLICATION (HCC): Status: RESOLVED | Noted: 2020-06-18 | Resolved: 2023-12-08

## 2023-12-08 PROBLEM — S91.301A NON-HEALING WOUND OF RIGHT HEEL: Status: RESOLVED | Noted: 2020-01-07 | Resolved: 2023-12-08

## 2023-12-08 PROBLEM — T14.8XXA WOUND INFECTION: Status: RESOLVED | Noted: 2019-05-24 | Resolved: 2023-12-08

## 2023-12-08 PROBLEM — E11.69 DYSLIPIDEMIA DUE TO TYPE 2 DIABETES MELLITUS (HCC): Status: RESOLVED | Noted: 2020-06-18 | Resolved: 2023-12-08

## 2023-12-08 PROBLEM — L97.509 DIABETIC FOOT ULCER (HCC): Status: RESOLVED | Noted: 2019-07-07 | Resolved: 2023-12-08

## 2023-12-08 PROBLEM — Z79.899 DRUG THERAPY: Status: RESOLVED | Noted: 2022-03-16 | Resolved: 2023-12-08

## 2023-12-08 PROBLEM — L97.523 DIABETIC ULCER OF TOE OF LEFT FOOT ASSOCIATED WITH TYPE 2 DIABETES MELLITUS, WITH NECROSIS OF MUSCLE (HCC): Status: RESOLVED | Noted: 2019-07-30 | Resolved: 2023-12-08

## 2023-12-08 PROBLEM — G98.8 DISORDER OF NERVOUS SYSTEM DUE TO TYPE 2 DIABETES MELLITUS (HCC): Status: RESOLVED | Noted: 2021-11-23 | Resolved: 2023-12-08

## 2023-12-08 PROBLEM — R29.818 NEUROLOGICAL DEFICIT PRESENT: Status: RESOLVED | Noted: 2022-02-17 | Resolved: 2023-12-08

## 2023-12-08 PROCEDURE — 99212 OFFICE O/P EST SF 10 MIN: CPT

## 2023-12-08 PROCEDURE — 99214 OFFICE O/P EST MOD 30 MIN: CPT

## 2023-12-08 PROCEDURE — 3078F DIAST BP <80 MM HG: CPT

## 2023-12-08 PROCEDURE — 3074F SYST BP LT 130 MM HG: CPT

## 2023-12-08 RX ORDER — CLOPIDOGREL BISULFATE 75 MG/1
75 TABLET ORAL DAILY
Qty: 100 TABLET | Refills: 3 | Status: SHIPPED | OUTPATIENT
Start: 2023-12-08

## 2023-12-08 RX ORDER — EZETIMIBE 10 MG/1
10 TABLET ORAL
Qty: 100 TABLET | Refills: 3 | Status: SHIPPED | OUTPATIENT
Start: 2023-12-08

## 2023-12-08 RX ORDER — METOPROLOL SUCCINATE 25 MG/1
TABLET, EXTENDED RELEASE ORAL
Qty: 200 TABLET | Refills: 3 | Status: SHIPPED | OUTPATIENT
Start: 2023-12-08

## 2023-12-08 ASSESSMENT — ENCOUNTER SYMPTOMS
NERVOUS/ANXIOUS: 0
MUSCULOSKELETAL NEGATIVE: 1
DEPRESSION: 0
EYES NEGATIVE: 1
NEUROLOGICAL NEGATIVE: 1
GASTROINTESTINAL NEGATIVE: 1
PALPITATIONS: 0
CONSTITUTIONAL NEGATIVE: 1
ORTHOPNEA: 0
SHORTNESS OF BREATH: 0
PND: 0

## 2023-12-08 ASSESSMENT — FIBROSIS 4 INDEX: FIB4 SCORE: 1.6

## 2023-12-08 NOTE — PROGRESS NOTES
Chief Complaint   Patient presents with    Coronary Artery Disease    Hyperlipidemia    Cardiomyopathy (Ischemic)       Subjective     Manan Aviles is a 58 y.o. male who presents today for follow up. They have a history of  CVA, DM2, HTN, HLD, PAD s/p angioplasty c/b osteomyelitis s/p LLE BKA 12/20/19; incidentally found new HF 30-35% now recovered and obstructive multivessel CAD     They are accompanied today by his wife    Last seen by Dr. Segovia on 9/19/2022, at that visit he was doing well without cardiac complaints,    Today 12/8/2023 he denies any cardiac complaints, feeling well overall    Activity: He goes on walks, he is able to go for 1 mile at a time. He has completed physical therapy, walks with a cane    Diet overall really healthy, avoid saturated fats and excessive sugars      Past Medical History:   Diagnosis Date    CAD (coronary artery disease)     Diabetes (HCC)     Hyperlipidemia     Hypertension      Past Surgical History:   Procedure Laterality Date    KNEE AMPUTATION BELOW Left 12/20/2019    Procedure: AMPUTATION, BELOW KNEE;  Surgeon: Koby Zhao M.D.;  Location: SURGERY [facility];  Service: Orthopedics    [facility] CARDIAC CATH  12/16/2019    multi-vessel disease    IRRIGATION & DEBRIDEMENT ORTHO Left 6/24/2019    Procedure: IRRIGATION AND DEBRIDEMENT, WOUND-FOOT, BIOLOGIC PLACEMENT, WOUND VAC PLACEMENT;  Surgeon: Charles Chopra M.D.;  Location: SURGERY [facility];  Service: Orthopedics     Family History   Problem Relation Age of Onset    Diabetes Mother     Diabetes Father      Social History     Socioeconomic History    Marital status:      Spouse name: Not on file    Number of children: Not on file    Years of education: Not on file    Highest education level: Not on file   Occupational History    Not on file   Tobacco Use    Smoking status: Never    Smokeless tobacco: Never   Vaping Use    Vaping Use: Never used   Substance and Sexual Activity    Alcohol use:  No    Drug use: No    Sexual activity: Not on file   Other Topics Concern    Not on file   Social History Narrative    Not on file     Social Determinants of Health     Financial Resource Strain: Not on file   Food Insecurity: Not on file   Transportation Needs: Not on file   Physical Activity: Not on file   Stress: Not on file   Social Connections: Not on file   Intimate Partner Violence: Not on file   Housing Stability: Not on file     No Known Allergies  Outpatient Encounter Medications as of 12/8/2023   Medication Sig Dispense Refill    metoprolol SR (TOPROL XL) 25 MG TABLET SR 24 HR Take 1 tablet by mouth twice daily, or as directed. 180 Tablet 3    metFORMIN (GLUCOPHAGE) 500 MG Tab Take 2 Tablets by mouth 2 times a day with meals. 360 Tablet 1    ezetimibe (ZETIA) 10 MG Tab Take 1 Tablet by mouth at bedtime. 90 Tablet 1    dapagliflozin propanediol (FARXIGA) 10 MG Tab Take 1 Tablet by mouth every day. 90 Tablet 1    glipiZIDE SR (GLUCOTROL) 2.5 MG TABLET SR 24 HR Take 1-2 Tablets by mouth every day. Pharmacy, DC any other glipizide RXs. (Patient taking differently: Take 5 mg by mouth every day.) 60 Tablet 6    atorvastatin (LIPITOR) 80 MG tablet Take 80 mg by mouth every day. FOR 90 DAYS      clopidogrel (PLAVIX) 75 MG Tab Take 1 Tablet by mouth every day. 90 Tablet 3    [DISCONTINUED] glipiZIDE SR (GLUCOTROL) 10 MG TABLET SR 24 HR Take 1 Tablet by mouth every day. 90 Tablet 3    [DISCONTINUED] vitamin D3 2000 UNIT Tab Take 1 Tablet by mouth every day. 60 Tablet     [DISCONTINUED] multivitamin (THERAGRAN) Tab Take 1 Tablet by mouth every day.      aspirin 81 MG EC tablet Take 1 tablet by mouth every day. 30 tablet 2     No facility-administered encounter medications on file as of 12/8/2023.     Review of Systems   Constitutional: Negative.    HENT: Negative.     Eyes: Negative.    Respiratory:  Negative for shortness of breath.    Cardiovascular:  Negative for chest pain, palpitations, orthopnea, leg swelling  and PND.   Gastrointestinal: Negative.    Genitourinary: Negative.    Musculoskeletal: Negative.    Skin: Negative.    Neurological: Negative.    Endo/Heme/Allergies: Negative.    Psychiatric/Behavioral:  Negative for depression. The patient is not nervous/anxious.               Objective     /60 (BP Location: Left arm, Patient Position: Sitting, BP Cuff Size: Adult)   Pulse 88   Resp 16   Ht 1.829 m (6')   Wt 78 kg (172 lb)   SpO2 97%   BMI 23.33 kg/m²     Physical Exam  Constitutional:       Appearance: Normal appearance.   HENT:      Head: Normocephalic.   Neck:      Vascular: No JVD.   Cardiovascular:      Rate and Rhythm: Normal rate and regular rhythm.      Pulses: Normal pulses.      Heart sounds: Normal heart sounds. No murmur heard.     No friction rub.   Pulmonary:      Effort: Pulmonary effort is normal.      Breath sounds: Normal breath sounds.   Abdominal:      Palpations: Abdomen is soft.   Musculoskeletal:         General: Normal range of motion.      Right lower leg: No edema.   Skin:     General: Skin is warm and dry.   Neurological:      General: No focal deficit present.      Mental Status: He is alert and oriented to person, place, and time.   Psychiatric:         Mood and Affect: Mood normal.         Behavior: Behavior normal.            Lab Results   Component Value Date/Time    CHOLSTRLTOT 230 (H) 09/15/2023 09:49 AM     (H) 09/15/2023 09:49 AM    HDL 48 09/15/2023 09:49 AM    TRIGLYCERIDE 171 (H) 09/15/2023 09:49 AM       Lab Results   Component Value Date/Time    SODIUM 138 09/15/2023 09:49 AM    POTASSIUM 4.4 09/15/2023 09:49 AM    CHLORIDE 102 09/15/2023 09:49 AM    CO2 24 09/15/2023 09:49 AM    GLUCOSE 158 (H) 09/15/2023 09:49 AM    BUN 20 09/15/2023 09:49 AM    CREATININE 0.94 09/15/2023 09:49 AM    CREATININE 0.9 05/09/2007 01:20 AM    BUNCREATRAT 16 02/15/2023 06:56 AM     Lab Results   Component Value Date/Time    ALKPHOSPHAT 81 09/15/2023 09:49 AM    ASTSGOT 15  09/15/2023 09:49 AM    ALTSGPT 11 09/15/2023 09:49 AM    TBILIRUBIN 0.4 09/15/2023 09:49 AM      [facility] 12/2019  Coronary Diagnostic Findings    * Left main: Distal tapering with 50% stenosis.    * Left anterior descending: Diffuse atherosclerosis and negative remodeling  beginning at the origin.  There are sequential 70% stenosis in the proximal  and mid segment of the vessel.  There is subtotal occlusion in the mid to  distal segment with AGATHA II flow into the apical LAD. The first diagonal has  proximal 70% stenosis and is diffusely diseased and negatively remodeled with  95% stenosis in the lower branch.    * Left circumflex: Mid vessel 70-80% stenosis.  The OM1 has diffuse  atherosclerosis there is less than 1.5 mm in size and without significant  narrowing.  The OM 2 bifurcates proximally the upper branch has proximal 50%  stenosis and is a less than 1.5 mm vessel.  The lower branch is a 2.25 mm  vessel and has proximal 85% stenosis.    * Right coronary artery: Dominant, diffuse 60% stenosis in the proximal mid  and distal AV groove.  There is a dual PDA.  The first PDA has a mid 80%  stenosis and diffuse disease downstream.  The second PDA has proximal 80%  stenosis.  Conclusions    1. Severe LV systolic dysfunction by noninvasive evaluation.    2. Severe and diffuse obstructive three-vessel coronary artery disease.    3. Normal LVEDP.    Echocardiogram 7/6/2021  CONCLUSIONS  Left ventricular ejection fraction is visually estimated to be 55%.  Unable to estimate pulmonary artery pressure due to an inadequate   tricuspid regurgitant jet.    Cardiac event monitor 8/2/2021  Findings:   Underlying rhythm: Predominantly sinus rhythm; average rate 82 bpm. No atrial fibrillation nor flutter detected.     Atrial events: Rare ectopy. 3 episodes of supraventricular tachycardia (SVT); fastest 174 bpm which was also longest at 9 beats.     Ventricular events: Rare ectopy.     Patient events: Patient triggered symptoms  associated with sinus rhythm. Asymptomatic, short SVT episodes.     Impressions:   Predominantly sinus rhythm.   Patient triggered symptoms associated with sinus rhythm.   Asymptomatic, short SVT episodes.   Rare ectopy.     Echocardiogram 2/18/2022  CONCLUSIONS  Technically difficult study incomplete information is obtained.   Normal left ventricular systolic function.  The left ventricular ejection fraction is visually estimated to be 60%.  Agitated saline (bubble) study was attempted with and without Valsalva   maneuver, however, bubbles not well visualized due to difficult imaging   window.  No evidence of valvular abnormality based on Doppler evaluation.     Assessment & Plan     1. Ischemic cardiomyopathy        2. Coronary artery disease due to lipid rich plaque        3. Primary hypertension        4. Mixed hyperlipidemia        5. Right sided cerebral hemisphere cerebrovascular accident (CVA) (HCC)            Medical Decision Making: Today's Assessment/Status/Plan:        History of ischemic cardiomyopathy now with recovered ejection fraction 60%  CAD  -Continue metoprolol 25 mg daily, Farxiga 10 mg daily, continue Plavix, DC aspirin, atorvastatin 80 mg daily with Zetia 10 mg daily  We addressed the management of atherosclerotic artery disease.  He is on proper antiplatelet, cholesterol management and beta-blockers as appropriate.  We addressed the potential side effects and laboratory follow-up for these medications.    Hypertension  - good control  - continue medications as above  - goal < 130/80     Hyperlipidemia  -Most recent   -He was started on the Zetia about 1 month ago, has been on atorvastatin 80 mg   -Goal of less than 70  -He has an upcoming lipid panel in February  -I suspect that the Zetia will not be sufficient to lower his cholesterol to goal, in that case we discussed that he would likely need a PSK 9 inhibitor, he verbalized understanding and is in favor of starting a PSK 9  inhibitor if indicated    Follow-up with YAMILKA Farias in 6 months    This note was dictated using Dragon speech recognition software.

## 2023-12-27 DIAGNOSIS — S88.119A BELOW-KNEE AMPUTATION (HCC): ICD-10-CM

## 2024-01-04 DIAGNOSIS — E11.69 TYPE 2 DIABETES MELLITUS WITH OTHER SPECIFIED COMPLICATION, WITHOUT LONG-TERM CURRENT USE OF INSULIN (HCC): ICD-10-CM

## 2024-01-04 DIAGNOSIS — I63.9 RIGHT SIDED CEREBRAL HEMISPHERE CEREBROVASCULAR ACCIDENT (CVA) (HCC): ICD-10-CM

## 2024-01-05 RX ORDER — DAPAGLIFLOZIN 10 MG/1
10 TABLET, FILM COATED ORAL DAILY
Qty: 90 TABLET | Refills: 1 | Status: SHIPPED | OUTPATIENT
Start: 2024-01-05 | End: 2024-02-08 | Stop reason: SDUPTHER

## 2024-01-08 RX ORDER — ATORVASTATIN CALCIUM 80 MG/1
80 TABLET, FILM COATED ORAL DAILY
Qty: 30 TABLET | Refills: 0 | Status: SHIPPED | OUTPATIENT
Start: 2024-01-08 | End: 2024-02-08 | Stop reason: SDUPTHER

## 2024-02-08 NOTE — TELEPHONE ENCOUNTER
Received request via: Patient    Was the patient seen in the last year in this department? Yes    Does the patient have an active prescription (recently filled or refills available) for medication(s) requested? No    [facility]    Does the patient have USP Plus and need 100 day supply (blood pressure, diabetes and cholesterol meds only)? Patient does not have SCP

## 2024-02-12 RX ORDER — DAPAGLIFLOZIN 10 MG/1
10 TABLET, FILM COATED ORAL DAILY
Qty: 90 TABLET | Refills: 1 | Status: SHIPPED | OUTPATIENT
Start: 2024-02-12 | End: 2024-03-06 | Stop reason: SDUPTHER

## 2024-02-12 RX ORDER — ATORVASTATIN CALCIUM 80 MG/1
80 TABLET, FILM COATED ORAL DAILY
Qty: 30 TABLET | Refills: 0 | Status: SHIPPED | OUTPATIENT
Start: 2024-02-12

## 2024-02-14 ENCOUNTER — TELEPHONE (OUTPATIENT)
Dept: CARDIOLOGY | Facility: MEDICAL CENTER | Age: 59
End: 2024-02-14
Payer: COMMERCIAL

## 2024-02-14 NOTE — TELEPHONE ENCOUNTER
----- Message from BANDAR Wisdom sent at 2/14/2024 10:17 AM PST -----    ----- Message -----  From: BANDAR Kennedy  Sent: 2/14/2024  12:00 AM PST  To: BANDAR Kennedy    Check if he got lipid panel done, likely needs PSK9

## 2024-02-26 ENCOUNTER — APPOINTMENT (OUTPATIENT)
Dept: MEDICAL GROUP | Facility: PHYSICIAN GROUP | Age: 59
End: 2024-02-26
Payer: COMMERCIAL

## 2024-03-06 RX ORDER — DAPAGLIFLOZIN 10 MG/1
10 TABLET, FILM COATED ORAL DAILY
Qty: 90 TABLET | Refills: 1 | Status: SHIPPED | OUTPATIENT
Start: 2024-03-06

## 2024-03-08 ENCOUNTER — APPOINTMENT (OUTPATIENT)
Dept: LAB | Facility: MEDICAL CENTER | Age: 59
End: 2024-03-08
Attending: NURSE PRACTITIONER
Payer: COMMERCIAL

## 2024-03-09 ENCOUNTER — APPOINTMENT (OUTPATIENT)
Dept: LAB | Facility: MEDICAL CENTER | Age: 59
End: 2024-03-09

## 2024-03-11 ENCOUNTER — APPOINTMENT (OUTPATIENT)
Dept: MEDICAL GROUP | Facility: PHYSICIAN GROUP | Age: 59
End: 2024-03-11

## 2024-03-12 RX ORDER — GLIPIZIDE 2.5 MG/1
2.5-5 TABLET, EXTENDED RELEASE ORAL DAILY
Qty: 60 TABLET | Refills: 6 | OUTPATIENT
Start: 2024-03-12

## 2024-03-21 ENCOUNTER — TELEPHONE (OUTPATIENT)
Dept: VASCULAR LAB | Facility: MEDICAL CENTER | Age: 59
End: 2024-03-21

## 2024-03-21 ENCOUNTER — TELEPHONE (OUTPATIENT)
Dept: MEDICAL GROUP | Facility: OTHER | Age: 59
End: 2024-03-21

## 2024-03-21 DIAGNOSIS — E11.65 UNCONTROLLED TYPE 2 DIABETES MELLITUS WITH HYPERGLYCEMIA (HCC): ICD-10-CM

## 2024-03-21 RX ORDER — GLIPIZIDE 2.5 MG/1
5 TABLET, EXTENDED RELEASE ORAL 2 TIMES DAILY
Qty: 120 TABLET | Refills: 0 | Status: SHIPPED | OUTPATIENT
Start: 2024-03-21 | End: 2024-03-21 | Stop reason: SDUPTHER

## 2024-03-21 RX ORDER — GLIPIZIDE 2.5 MG/1
2.5-5 TABLET, EXTENDED RELEASE ORAL DAILY
Qty: 60 TABLET | Refills: 6 | Status: CANCELLED | OUTPATIENT
Start: 2024-03-21

## 2024-03-21 RX ORDER — GLIPIZIDE 2.5 MG/1
5 TABLET, EXTENDED RELEASE ORAL 2 TIMES DAILY
Qty: 120 TABLET | Refills: 11 | Status: SHIPPED | OUTPATIENT
Start: 2024-03-21

## 2024-03-21 NOTE — TELEPHONE ENCOUNTER
Manan's wife Augustina came in stating that he is totally out of his medication and would like to see if it can be escalated to another provider as he is totally out of the medication. There was and issue with his insurance not being active.    glipiZIDE (GLUCOTROL) 5 MG Tab [[id number]]     
Female

## 2024-03-21 NOTE — TELEPHONE ENCOUNTER
Called pt back - he has dm pharmaco f/u appt on 4/8.   Sent in 30 day RF. Pt reports he's currently using glipizide 5 mg BID.    Nash Colon, PharmD, BCACP

## 2024-03-21 NOTE — TELEPHONE ENCOUNTER
Caller:     Terrance Rehman     Topic/issue: Patient is out of medication, office will not allow refills , and pharmacy states patient insurance does not cover medication/ it can not be filled.    Medication:   glipiZIDE SR (GLUCOTROL) 2.5 MG TABLET SR 24 HR     Callback Number:  [phone]     Thank you  Tiffani PHELAN

## 2024-03-21 NOTE — TELEPHONE ENCOUNTER
Received request via: Patient    Was the patient seen in the last year in this department? Yes    Does the patient have an active prescription (recently filled or refills available) for medication(s) requested? No    Pharmacy Name: [facility] [address] [place] [address]    Does the patient have CHCF Plus and need 100 day supply (blood pressure, diabetes and cholesterol meds only)? Patient does not have SCP

## 2024-04-04 ENCOUNTER — HOSPITAL ENCOUNTER (OUTPATIENT)
Dept: LAB | Facility: MEDICAL CENTER | Age: 59
End: 2024-04-04
Attending: NURSE PRACTITIONER
Payer: COMMERCIAL

## 2024-04-04 DIAGNOSIS — E11.69 TYPE 2 DIABETES MELLITUS WITH OTHER SPECIFIED COMPLICATION, WITHOUT LONG-TERM CURRENT USE OF INSULIN (HCC): ICD-10-CM

## 2024-04-04 LAB
EST. AVERAGE GLUCOSE BLD GHB EST-MCNC: 226 MG/DL
HBA1C MFR BLD: 9.5 % (ref 4–5.6)

## 2024-04-04 PROCEDURE — 83036 HEMOGLOBIN GLYCOSYLATED A1C: CPT

## 2024-04-04 PROCEDURE — 36415 COLL VENOUS BLD VENIPUNCTURE: CPT

## 2024-04-04 PROCEDURE — 80061 LIPID PANEL: CPT

## 2024-04-04 PROCEDURE — 80053 COMPREHEN METABOLIC PANEL: CPT

## 2024-04-05 LAB
ALBUMIN SERPL BCP-MCNC: 4.4 G/DL (ref 3.2–4.9)
ALBUMIN/GLOB SERPL: 1.2 G/DL
ALP SERPL-CCNC: 75 U/L (ref 30–99)
ALT SERPL-CCNC: 5 U/L (ref 2–50)
ANION GAP SERPL CALC-SCNC: 12 MMOL/L (ref 7–16)
AST SERPL-CCNC: 10 U/L (ref 12–45)
BILIRUB SERPL-MCNC: 0.4 MG/DL (ref 0.1–1.5)
BUN SERPL-MCNC: 20 MG/DL (ref 8–22)
CALCIUM ALBUM COR SERPL-MCNC: 9.2 MG/DL (ref 8.5–10.5)
CALCIUM SERPL-MCNC: 9.5 MG/DL (ref 8.5–10.5)
CHLORIDE SERPL-SCNC: 102 MMOL/L (ref 96–112)
CHOLEST SERPL-MCNC: 239 MG/DL (ref 100–199)
CO2 SERPL-SCNC: 25 MMOL/L (ref 20–33)
CREAT SERPL-MCNC: 0.72 MG/DL (ref 0.5–1.4)
FASTING STATUS PATIENT QL REPORTED: NORMAL
GFR SERPLBLD CREATININE-BSD FMLA CKD-EPI: 106 ML/MIN/1.73 M 2
GLOBULIN SER CALC-MCNC: 3.7 G/DL (ref 1.9–3.5)
GLUCOSE SERPL-MCNC: 202 MG/DL (ref 65–99)
HDLC SERPL-MCNC: 46 MG/DL
LDLC SERPL CALC-MCNC: 159 MG/DL
POTASSIUM SERPL-SCNC: 4.4 MMOL/L (ref 3.6–5.5)
PROT SERPL-MCNC: 8.1 G/DL (ref 6–8.2)
SODIUM SERPL-SCNC: 139 MMOL/L (ref 135–145)
TRIGL SERPL-MCNC: 171 MG/DL (ref 0–149)

## 2024-04-08 ENCOUNTER — TELEPHONE (OUTPATIENT)
Dept: VASCULAR LAB | Facility: MEDICAL CENTER | Age: 59
End: 2024-04-08

## 2024-04-08 ENCOUNTER — OFFICE VISIT (OUTPATIENT)
Dept: MEDICAL GROUP | Facility: PHYSICIAN GROUP | Age: 59
End: 2024-04-08
Payer: COMMERCIAL

## 2024-04-08 VITALS — WEIGHT: 171 LBS | BODY MASS INDEX: 23.16 KG/M2 | HEART RATE: 87 BPM | OXYGEN SATURATION: 96 % | HEIGHT: 72 IN

## 2024-04-08 DIAGNOSIS — I25.83 CORONARY ARTERY DISEASE DUE TO LIPID RICH PLAQUE: ICD-10-CM

## 2024-04-08 DIAGNOSIS — E11.65 UNCONTROLLED TYPE 2 DIABETES MELLITUS WITH HYPERGLYCEMIA (HCC): ICD-10-CM

## 2024-04-08 DIAGNOSIS — I25.10 CORONARY ARTERY DISEASE DUE TO LIPID RICH PLAQUE: ICD-10-CM

## 2024-04-08 PROCEDURE — 99402 PREV MED CNSL INDIV APPRX 30: CPT

## 2024-04-08 RX ORDER — ORAL SEMAGLUTIDE 3 MG/1
3 TABLET ORAL DAILY
Qty: 90 TABLET | Refills: 1 | Status: SHIPPED | OUTPATIENT
Start: 2024-04-08

## 2024-04-08 RX ORDER — ORAL SEMAGLUTIDE 3 MG/1
3 TABLET ORAL DAILY
Qty: 90 TABLET | Refills: 1 | Status: SHIPPED | OUTPATIENT
Start: 2024-04-08 | End: 2024-04-08

## 2024-04-08 NOTE — TELEPHONE ENCOUNTER
Received New start PA request via [employer]  for RYBELSUS 3MG TABLETS. (Quantity:90, Day Supply:90)     Insurance: [employer] [facility]  Member ID:  [id number]  BIN: [id number]  PCN: [id number]  Group: [id number]     Ran Test claim via [place] & medication Rejects stating prior authorization is required.    ELIZABETH Alcaraz, PhT  Vascular Pharmacy Liaison (Rx Coordinator)  P: [phone]  4/8/2024 10:06 AM

## 2024-04-08 NOTE — Clinical Note
Dr. Bloch, We are treating him for diabetes and dyslipidemia.  I am starting him on Repatha.  He has been very resistant to adding any new medications, but was open to the idea today  Dr. Quispe,  I am referring him to the endocrinology office again.  Per our protocol, his A1c has increased. They are very resistant to insulin or injectable GLP-1's He was open to trying Rybelsus today  Phillip, His primary care physician is listed as a [facility] family medicine doctor.  However, I was not able to prescribe medications under this doctor and put it under Wanda's name.  Is there a better way to prescribe medications for this patient?   Elaine,  Any help with prior authorizations for Repatha and/or Rybelsus.   Thanks, Kashif

## 2024-04-08 NOTE — TELEPHONE ENCOUNTER
REPATHA SURECLICK 140MG/ML SOLUTION AUTO INJECTOR       Initiated PA in cmm  Key: [id number]      Insurer to decide

## 2024-04-08 NOTE — PROGRESS NOTES
Patient Consult Note       TIME IN: 848am   TIME OUT: 925am      Primary care physician: Donny Mcnally M.D.    Reason for consult: Management of Uncontrolled Type 2 Diabetes    HPI:  Israel Aviles is a 56 y.o. old patient who comes in today for evaluation of above stated problem.    Allergies:  Patient has no known allergies.    Most Recent labs, A1c, and immunizations:    Lab Results   Component Value Date/Time    HBA1C 9.5 (H) 04/04/2024 10:27 AM      Latest Reference Range & Units 02/15/23 06:56   LDL Chol Calc (NIH) 0 - 99 mg/dL 161 (H)   (H): Data is abnormally high         Lab Results   Component Value Date/Time    CHOLSTRLTOT 239 (H) 04/04/2024 10:27 AM     (H) 04/04/2024 10:27 AM    HDL 46 04/04/2024 10:27 AM    TRIGLYCERIDE 171 (H) 04/04/2024 10:27 AM       Lab Results   Component Value Date/Time    SODIUM 139 04/04/2024 10:27 AM    POTASSIUM 4.4 04/04/2024 10:27 AM    CHLORIDE 102 04/04/2024 10:27 AM    CO2 25 04/04/2024 10:27 AM    GLUCOSE 202 (H) 04/04/2024 10:27 AM    BUN 20 04/04/2024 10:27 AM    CREATININE 0.72 04/04/2024 10:27 AM    CREATININE 0.9 05/09/2007 01:20 AM    BUNCREATRAT 16 02/15/2023 06:56 AM     Lab Results   Component Value Date/Time    ALKPHOSPHAT 75 04/04/2024 10:27 AM    ASTSGOT 10 (L) 04/04/2024 10:27 AM    ALTSGPT 5 04/04/2024 10:27 AM    TBILIRUBIN 0.4 04/04/2024 10:27 AM    INR 1.05 02/17/2022 09:46 AM    ALBUMIN 4.4 04/04/2024 10:27 AM      Lab Results   Component Value Date/Time    MALBCRT 20 06/15/2023 10:06 AM    MICROALBUR 1.6 06/15/2023 10:06 AM     Immunization History   Administered Date(s) Administered    Hep A/HEP B Combined Vaccine (T[employer]ix) 08/06/2021    Hepatitis B Vaccine (Adol/Adult) 06/09/2008    Influenza (IM) Preservative Free - HISTORICAL DATA 10/15/2010    Influenza Vac Subunit Quad Inj (Pf) 12/11/2021    Influenza Vaccine H1N1 - HISTORICAL DATA 02/27/2010    MMR Vaccine 06/09/2008    MODERNA SARS-COV-2 VACCINE (12+) 01/04/2022    PFIZER  PURPLE CAP SARS-COV-2 VACCINATION (12+) 04/07/2021, 04/30/2021    TD Vaccine 06/09/2008    Varicella Vaccine Live 06/09/2008         Medications:    Current Outpatient Medications:     glipiZIDE ER, 5 mg, Oral, BID    dapagliflozin propanediol, 10 mg, Oral, DAILY    atorvastatin, 80 mg, Oral, DAILY    metFORMIN, 1,000 mg, Oral, BID WITH MEALS    clopidogrel, 75 mg, Oral, DAILY    ezetimibe, 10 mg, Oral, QHS    metoprolol SR, Take 1 tablet by mouth twice daily, or as directed.      History/ Failed Diabetic Medications if applicable:  jardiance low BP   Trulicity - due to constipation and low appetite     Preventative Management  BP regimen (ACE/ARB) - no  ASA - yes  Statin - lipitor   Last Retinal Scan - done   Monofilament/foot exam -BKA, sees podiatrist regularly.    Hypoglycemia awareness - yes  Nocturnal hypoglycemia- yes  Hypoglycemia:  Occasional    Physical Examination:  Vital signs: Pulse 87   Ht 1.829 m (6')   Wt 77.6 kg (171 lb)   SpO2 96%   BMI 23.19 kg/m²  Body mass index is 23.19 kg/m².    Change in weight: Stable  Exercise habits: minimal exercise   Diet: DASH diet    Assessment and Plan:  1. DM2   A1C above goal  He will likely need a GLP-1 and/or insulin to get A1c to goal   He continues to decline injectable drugs for diabetes  Constipation with Trulicity in the past  Medication(s) recommended:   Continue Farxiga 10 mg once daily  Continue glipizide 5 mg twice daily  Continue metformin 1000 mg twice daily  Start Rybelsus 3 mg once daily  Consider long-acting insulin at next appointment  They declined today  Consider Actos  They declined today      -Blood glucose and A1c target    However, more aggressive diabetic control is reasonable when tolerated.  Pt's treatment of Hypoglycemia. 15:15 Rule discussed with patient      2.  Cardiovascular  LDL above goal of <55-70 with CAD, TIA, and  PVD  They stopped Zetia since the last appointment  Medication(s) recommended.   Continue with Lipitor 80mg daily    Restart Zetia 10mg daily   Start Repatha 140 mg every 14 days  Very hesitant about starting injections  Consider bempedoic acid at subsequent appointments if needed      3.  Lifestyle changes   Focus on eating a DASH/Mediterranean-style diet.   Exercise 30 minutes daily at least 5 days/week, as tolerated.  https://www.niddk.nih.gov/bwp        Follow-up appointment in 12 week(s)    Jevon Barnard, PharmD, MS, BCACP, LCC  [facility]  Phone [phone] fax [phone]    This note was created using voice recognition software (Dragon). The accuracy of the dictation is limited by the abilities of the software. I have reviewed the note prior to signing, however some errors in grammar and context are still possible. If you have any questions related to this note please do not hesitate to contact our office.     CC:   Donny Mcnally M.D.  No ref. provider found  Dr. Jude Dallas

## 2024-04-08 NOTE — TELEPHONE ENCOUNTER
Received New start PA request via [employer]  for REPATHA SURECLICK 140MG/ML SOLUTION AUTO INJECTOR . (Quantity:6 mls, Day Supply:84)     Insurance: [employer] [facility]  Member ID:  [id number]  BIN: [id number]  PCN: [id number]  Group: [id number]     Ran Test claim via [place] & medication Rejects stating prior authorization is required.    ELIZABETH Alcaraz, PhT  Vascular Pharmacy Liaison (Rx Coordinator)  P: [phone]  4/8/2024 10:01 AM

## 2024-04-08 NOTE — TELEPHONE ENCOUNTER
PA has been cancelled for Rybelsus due to insufficient clinical documentation. Per insurance, Pt must have a documentation that he tried and failed GLP-1 receptor medications (Trulicity, Victoza, Ozempic). Pt only tried and failed Trulicity.     Notified and updated clinical staff for the status.     ELIZABETH Alcaraz, PhT  Vascular Pharmacy Liaison (Rx Coordinator)  P: [phone]  4/8/2024 12:13 PM

## 2024-04-11 NOTE — TELEPHONE ENCOUNTER
REPATHA SURECLICK 140MG/ML SOLUTION AUTO INJECTOR     Per CMM (Key: [id number]): Turnaround time for review of a PA request is dependent upon insurance plan and can range from 24 hours to 5 calendar days.      Called [facility] Rx PA department  at  [phone]     Spoke to Carleen Vanessa PA via phone. answered clinical questions via phone    Prior Authorization for Repatha has been approved for a quantity of 2ml , day supply 28    Prior Authorization reference number: [id number]  Insurance: [employer]  Effective dates: 04/16/2024 - 04/16/2025  Copay: $359.46 MAX 30DS allowed     Is patient eligible to fill with [facility]? Yes    Next Steps: The patient's copay exceeds $5.00. Proceed with contacting patient to offer financial assistance.

## 2024-04-12 ENCOUNTER — APPOINTMENT (OUTPATIENT)
Dept: MEDICAL GROUP | Facility: OTHER | Age: 59
End: 2024-04-12
Payer: COMMERCIAL

## 2024-04-12 VITALS
DIASTOLIC BLOOD PRESSURE: 78 MMHG | SYSTOLIC BLOOD PRESSURE: 122 MMHG | HEIGHT: 72 IN | WEIGHT: 168 LBS | HEART RATE: 85 BPM | TEMPERATURE: 96.4 F | RESPIRATION RATE: 16 BRPM | BODY MASS INDEX: 22.75 KG/M2 | OXYGEN SATURATION: 97 %

## 2024-04-12 DIAGNOSIS — I25.83 CORONARY ARTERY DISEASE DUE TO LIPID RICH PLAQUE: ICD-10-CM

## 2024-04-12 DIAGNOSIS — S88.112S BELOW-KNEE AMPUTATION OF LEFT LOWER EXTREMITY, SEQUELA (HCC): ICD-10-CM

## 2024-04-12 DIAGNOSIS — I25.10 CORONARY ARTERY DISEASE DUE TO LIPID RICH PLAQUE: ICD-10-CM

## 2024-04-12 DIAGNOSIS — E11.65 UNCONTROLLED TYPE 2 DIABETES MELLITUS WITH HYPERGLYCEMIA (HCC): ICD-10-CM

## 2024-04-12 DIAGNOSIS — I69.30 LATE EFFECT OF STROKE: ICD-10-CM

## 2024-04-12 DIAGNOSIS — E11.42 DIABETIC POLYNEUROPATHY ASSOCIATED WITH TYPE 2 DIABETES MELLITUS (HCC): ICD-10-CM

## 2024-04-12 PROCEDURE — 3074F SYST BP LT 130 MM HG: CPT | Performed by: FAMILY MEDICINE

## 2024-04-12 PROCEDURE — 3078F DIAST BP <80 MM HG: CPT | Performed by: FAMILY MEDICINE

## 2024-04-12 PROCEDURE — 99214 OFFICE O/P EST MOD 30 MIN: CPT | Performed by: FAMILY MEDICINE

## 2024-04-12 RX ORDER — DAPAGLIFLOZIN 10 MG/1
10 TABLET, FILM COATED ORAL DAILY
Qty: 90 TABLET | Refills: 1 | Status: CANCELLED | OUTPATIENT
Start: 2024-04-12

## 2024-04-12 RX ORDER — DAPAGLIFLOZIN 10 MG/1
10 TABLET, FILM COATED ORAL DAILY
Qty: 90 TABLET | Refills: 3 | Status: SHIPPED | OUTPATIENT
Start: 2024-04-12

## 2024-04-12 RX ORDER — ATORVASTATIN CALCIUM 80 MG/1
80 TABLET, FILM COATED ORAL DAILY
Qty: 90 TABLET | Refills: 4 | Status: SHIPPED | OUTPATIENT
Start: 2024-04-12

## 2024-04-12 ASSESSMENT — PATIENT HEALTH QUESTIONNAIRE - PHQ9: CLINICAL INTERPRETATION OF PHQ2 SCORE: 0

## 2024-04-12 NOTE — ASSESSMENT & PLAN NOTE
Encouraged to continue to follow-up with the specialty Pharmacist.   Will be sure he has refills of all medications.

## 2024-04-12 NOTE — ASSESSMENT & PLAN NOTE
Stable, no angina.   Continue maximal medical therapies.   Cardiology follow-up in June.   Will refer him to Cardiac Rehab---nor sure he has ever been to them.

## 2024-04-12 NOTE — PROGRESS NOTES
[facility]     PATIENT ID:  NAME:  Israel Aviles  MRN:               [id number]  YOB: 1965    Date: 10:18 AM      CC:  Diabetes, Heart, Blood pressure      HPI: Israel Aviles is a 58 y.o. male who presented with multiple concerns:    Problem   Late Effect of Stroke    He has been out walking.  Has never neem through Cardiac Rehab he says.      Below-Knee Amputation (Hcc)    Needs a new liner for his left lower extremity prosthesis, and possibly a shoe.  He uses [facility].      Cad (Coronary Artery Disease)    Has known cardiac disease, and a past history of MI and was a candidate for CABG in past.  Has risk factors of Diabetes and Dyslipidemia, PAD, prior Stroke.  Denies any chest pain, dyspnea.  He is on maximal medical therapy including Farxiga.      Diabetic Polyneuropathy Associated With Type 2 Diabetes Mellitus (Hcc)    Monofilament exam performed today on right foot.  Has had prior left lower extremity amputation, an AKA.      Uncontrolled Type 2 Diabetes Mellitus With Hyperglycemia (Hcc)    Patient denies headaches, vision changes, weakness, syncope or near syncope, abdominal pain, diarrhea, polyuria, polyphagia, or weight changes.  Has been on Farxiga.  Having trouble getting the Rybelsus approved and covered by their insurance.  Has seen the Diabetes Pharmacy folks.          REVIEW OF SYSTEMS:   Ten systems reviewed and were negative except as noted in the HPI.                PROBLEM LIST  Patient Active Problem List   Diagnosis    Uncontrolled type 2 diabetes mellitus with hyperglycemia (HCC)    PVD (peripheral vascular disease) (HCC)    Diabetic polyneuropathy associated with type 2 diabetes mellitus (HCC)    Pressure injury of deep tissue of left foot    Osteomyelitis of left foot (HCC)    Vitamin D deficiency    CAD (coronary artery disease)    HTN (hypertension)    Orthostatic hypotension    Below-knee amputation (HCC)    Adjustment disorder with depressed mood     Ischemic cardiomyopathy    Body mass index (BMI) 23.0-23.9, adult    TIA (transient ischemic attack)    Colon cancer screening declined    Anemia    Right sided cerebral hemisphere cerebrovascular accident (CVA) (HCC)    Late effect of stroke    Anxiety    Mixed hyperlipidemia    Encounter for preventive care        PAST SURGICAL HISTORY:  Past Surgical History:   Procedure Laterality Date    KNEE AMPUTATION BELOW Left 12/20/2019    Procedure: AMPUTATION, BELOW KNEE;  Surgeon: Koby Zhao M.D.;  Location: SURGERY [facility];  Service: Orthopedics    Z CARDIAC CATH  12/16/2019    multi-vessel disease    IRRIGATION & DEBRIDEMENT ORTHO Left 6/24/2019    Procedure: IRRIGATION AND DEBRIDEMENT, WOUND-FOOT, BIOLOGIC PLACEMENT, WOUND VAC PLACEMENT;  Surgeon: Charles Chopra M.D.;  Location: SURGERY [facility];  Service: Orthopedics       FAMILY HISTORY:  Family History   Problem Relation Age of Onset    Diabetes Mother     Diabetes Father        SOCIAL HISTORY:   Social History     Tobacco Use    Smoking status: Never    Smokeless tobacco: Never   Substance Use Topics    Alcohol use: No       ALLERGIES:  No Known Allergies    OUTPATIENT MEDICATIONS:    Current Outpatient Medications:     dapagliflozin propanediol (FARXIGA) 10 MG Tab, Take 1 Tablet by mouth every day., Disp: 90 Tablet, Rfl: 3    Evolocumab (REPATHA) Solution Prefilled Syringe SubQ injection syringe, Inject 1 mL under the skin every 14 days., Disp: 6 mL, Rfl: 1    Semaglutide (RYBELSUS) 3 MG Tab, Take 3 mg by mouth every day., Disp: 90 Tablet, Rfl: 1    glipiZIDE ER (GLUCOTROL XL) 2.5 MG TABLET SR 24 HR, Take 2 Tablets by mouth 2 times a day., Disp: 120 Tablet, Rfl: 11    atorvastatin (LIPITOR) 80 MG tablet, Take 1 Tablet by mouth every day. FOR 90 DAYS, Disp: 30 Tablet, Rfl: 0    metFORMIN (GLUCOPHAGE) 500 MG Tab, Take 2 Tablets by mouth 2 times a day with meals., Disp: 360 Tablet, Rfl: 1    clopidogrel (PLAVIX) 75 MG Tab, Take 1 Tablet by  mouth every day., Disp: 100 Tablet, Rfl: 3    ezetimibe (ZETIA) 10 MG Tab, Take 1 Tablet by mouth at bedtime., Disp: 100 Tablet, Rfl: 3    metoprolol SR (TOPROL XL) 25 MG TABLET SR 24 HR, Take 1 tablet by mouth twice daily, or as directed., Disp: 200 Tablet, Rfl: 3    PHYSICAL EXAM:  Vitals:    04/12/24 0934   BP: 122/78   BP Location: Left arm   Patient Position: Sitting   BP Cuff Size: Adult   Pulse: 85   Resp: 16   Temp: (!) 35.8 °C (96.4 °F)   TempSrc: Temporal   SpO2: 97%   Weight: 76.2 kg (168 lb)   Height: 1.829 m (6')       General: Pt resting in NAD, cooperative   Skin:  Pink, warm and dry.  HEENT: NC/AT. EOMI.  Neck: no JVD, no carotid bruits.   Lungs:  Symmetrical.  CTAB, good air movement   Cardiovascular:  normal S1/S2 RRR without murmur or gallop.   Extremities:  Left AKA, wears prosthesis.   CNS:  Muscle tone is normal. No gross focal neurologic deficits  Note: unable to feel the monofilament testing on his right foot.     ASSESSMENT/PLAN:   58 y.o. male     Problem List Items Addressed This Visit       Below-knee amputation (HCC)     Referral to [facility] for his amputation DME needs.          Relevant Orders    DME Other    Diabetic Monofilament Lower Extremity Exam (Completed)    CAD (coronary artery disease)     Stable, no angina.   Continue maximal medical therapies.   Cardiology follow-up in June.   Will refer him to Cardiac Rehab---nor sure he has ever been to them.          Relevant Medications    dapagliflozin propanediol (FARXIGA) 10 MG Tab    Other Relevant Orders    Referral to Pharmacotherapy Service    Referral to Cardiac Rehab    Diabetic polyneuropathy associated with type 2 diabetes mellitus (HCC)     Was unable to feel the monofilament on his right foot sole.          Relevant Medications    dapagliflozin propanediol (FARXIGA) 10 MG Tab    Late effect of stroke     Walking with 4 point walker.   Will refer to Cardiac Rehab to see if they have anything to offer him.           Relevant Medications    dapagliflozin propanediol (FARXIGA) 10 MG Tab    Other Relevant Orders    Referral to Pharmacotherapy Service    Referral to Cardiac Rehab    DME Other    Uncontrolled type 2 diabetes mellitus with hyperglycemia (HCC)     Encouraged to continue to follow-up with the specialty Pharmacist.   Will be sure he has refills of all medications.          Relevant Medications    dapagliflozin propanediol (FARXIGA) 10 MG Tab    Other Relevant Orders    Referral to Pharmacotherapy Service    DME Other    Diabetic Monofilament Lower Extremity Exam (Completed)       Donny Mcnally MD  [facility]

## 2024-04-12 NOTE — TELEPHONE ENCOUNTER
Received request via: Patient    Was the patient seen in the last year in this department? Yes    Does the patient have an active prescription (recently filled or refills available) for medication(s) requested? No    [facility]    Does the patient have longterm Plus and need 100 day supply (blood pressure, diabetes and cholesterol meds only)? Patient does not have SCP

## 2024-04-12 NOTE — ASSESSMENT & PLAN NOTE
Walking with 4 point walker.   Will refer to Cardiac Rehab to see if they have anything to offer him.

## 2024-04-18 PROCEDURE — RXMED WILLOW AMBULATORY MEDICATION CHARGE: Performed by: NURSE PRACTITIONER

## 2024-04-22 NOTE — TELEPHONE ENCOUNTER
Patient Phone Number: [phone]  Medication: Evolocumab (REPATHA) Solution Prefilled Syringe SubQ injection syringe  (Quantity 2 mls, Day Supply 28)  Copay: $359.46  Household size:2  Annual Household Adjusted Gross Income: N/A (commercial insurance pt)  Additional information/consent to FA: YES!    ELIZABETH Alcaraz, PhT  Vascular Pharmacy Liaison (Rx Coordinator)  P: [phone]  4/22/2024 11:26 AM

## 2024-04-22 NOTE — TELEPHONE ENCOUNTER
Called and spoke to Pt today to offer copay card assistance for his Repatha. Pt verbally consented and is approved for the copay card.     ELIZABETH Alcaraz, PhT  Vascular Pharmacy Liaison (Rx Coordinator)  P: [phone]  4/22/2024 11:24 AM

## 2024-04-22 NOTE — TELEPHONE ENCOUNTER
Medication: Evolocumab (REPATHA) Solution Prefilled Syringe SubQ injection syringe   Type of Insurance: Commercial  Type of Financial assistance requested Copay Card  Source: [employer]   Source Phone #: [phone]  Outcome: APPROVED  Effective dates: 04/22/2024-04/22/2027  Details/Billing Information:   BIN: [id number]  PCN: [id number]   GRP: [id number]  ID: [id number]  MAX AMOUNT AWARD: $3,000/CALENDAR YEAR    Final Copay: $ 5.00      ELIZABETH Alcaraz, PhT  Vascular Pharmacy Liaison (Rx Coordinator)  P: [phone]  4/22/2024 11:28 AM

## 2024-04-24 ENCOUNTER — DOCUMENTATION (OUTPATIENT)
Dept: PHARMACY | Facility: MEDICAL CENTER | Age: 59
End: 2024-04-24
Payer: COMMERCIAL

## 2024-04-24 NOTE — PROGRESS NOTES
PHARMACIST PRE SCREEN - **New Onboarding**  Diagnosis: Mixed hyperlipidemia [E78.2]      Drug & Non-Drug Allergies: EMR Reviewed. No concerning drug or non-drug allergies.                                                                                          Drug Therapy (name/formulation/dose/route of admin/frequency):  Repatha 140mg/mL solution in pre-filled syringe. Inject 1 syringe under the skin every 14 days.     EMR Reviewed   - Dose Appropriateness (Y/N):  Y   - Renal or Hepatic Adjustments (Y/N):  N   - Any comorbidities, PMH, precautions, or contraindications that pose a medication safety concern? (Y/N):  N   - Any relevant lab work needed that affects the initial start date? (Y/N):      N   - List Past Treatments:  Ezetimibe; atorvastatin   - EMR Med List reviewed.  List DDI’s:  No clinically significant DDIs   - Patient’s ability to self-administer medication:  Issues identified per EMR. Most recent T2DM follow up visit with in-clinic pharmacist indicates he is very hesitant about starting injections  List Goals of Therapy:    Achieve goal LDL levels (<  mg/dL) to reduce risk of cardiovascular events    Implement lifestyle modifications: diet, exercise, weight loss, and smoking cessation      Patient declined initial counseling via phone. Prefers to receive counseling at time of  from dispensing pharmacist.

## 2024-05-01 DIAGNOSIS — E11.65 UNCONTROLLED TYPE 2 DIABETES MELLITUS WITH HYPERGLYCEMIA (HCC): ICD-10-CM

## 2024-05-01 RX ORDER — GLIPIZIDE 2.5 MG/1
5 TABLET, EXTENDED RELEASE ORAL 2 TIMES DAILY
Qty: 120 TABLET | Refills: 11 | Status: SHIPPED | OUTPATIENT
Start: 2024-05-01

## 2024-05-01 NOTE — TELEPHONE ENCOUNTER
Received request via: Pharmacy    Was the patient seen in the last year in this department? Yes    Does the patient have an active prescription (recently filled or refills available) for medication(s) requested? No    Ph[facility]acy Name: [facility]    Does the patient have custodial Plus and need 100 day supply (blood pressure, diabetes and cholesterol meds only)? Patient does not have SCP

## 2024-05-07 ENCOUNTER — DOCUMENTATION (OUTPATIENT)
Dept: PHARMACY | Facility: MEDICAL CENTER | Age: 59
End: 2024-05-07
Payer: COMMERCIAL

## 2024-05-07 NOTE — PROGRESS NOTES
PHARMACIST FOLLOW UP - Initial Assessment  Dx: Mixed hyperlipidemia [E78.2]  Primary or Secondary Prevention of clinical ASCVD: Secondary     Tx prescribed: Repatha 140mg/mL solution in pre-filled syringe. Inject 1 syringe under the skin every 14 days [+ atorvastatin tab, 80mg by mouth daily], Manan shared he's no longer on ezetimibe tab, 10mg by mouth daily    Administration:  Repatha 140mg, injecting 1 syringe into abdomen every other week. Patient declined full review of admin, shared he's familiar with using syringes.     Proper Handling/Disposal: Advised to bring to room temp prior to injecting. Once brought to room temp (i.e if left out for more than 1 hour) to not return to refrigeration. After injecting to immediately dispose in sharps container.     Storage/Excursion: keep Repatha refrigerated in original package, protect from freezing, light, and heat. Can be stored at room temp (max of 77F) for up to 30 days.    Duration of therapy: until progression, toxicity, or no longer clinically beneficial  Adherence & Potential Barriers: First dose injected on 4/28; advised next doses will fall on 5/12, 5/26, and 6/9.     Missed dose mgmt: deferred**   List Common, Serious SE & Mitigation Reviewed:     Headache: Tylenol, ibuprofen    Injection Site Reactions (swelling, bruising, pain, irritation): ice before/after admin; avoiding/limiting touching inj site after; encouraged site rotation and avoiding the last injection site by at least 2 inches. Should     nasopharyngitis (cold symptoms ):  non-drowsy anti-histamines; throat lozenges  List Precautions Reviewed: no evidence of hypersensitivity  List Current SE Reviewed (if applicable): none    Mitigation/mgmt: n/a  List Changes to Allergies, Diagnoses: none  S/Sx: none  Clinically Relevant, Abnormal Labs:     A1c: (4/4/24) 9.5*H    Lipid Panel: (4/4/24) Chol 239*H HHG544*H HDL 46 *H    HeFH/HoFH: N  Wellness/Lifestyle Counseling:    Immunizations: deferred**      Diet: vegetarian; occasional consumes cheese. Advised to limit cheese because of the fat/salt content. In efforts to further improve blood sugars/lipids to limit refined carbohydrates as well.     Exercise: walks for 30 minutes daily     Smoking: cessation review n/a; non-smoker/never smoked per EMR   Med Rec/Updated drug list: EMR accurate, no medication list inaccuracies. Reviewed with patient/caregiver.  DI Check: No clinically significant DDIs  Common DI & Dietary Avoidance: Reviewed to not start any new meds/herbs/OTCs/supplements without speaking to clinic/pharmacist to check for DDIs.   Goals of Therapy:      Achieve goal LDL levels (<  mg/dL) to reduce risk of cardiovascular events    Implement lifestyle modifications: diet, exercise, weight loss, and smoking cessation  Patient has agreed/understands to goals of therapy during education/counseling    Additional: Had a brief talk with Manan to review Repatha after declining initial assessment with clinical pharmacist team. Started Repatha on 4/28 and asked if the syringes can be used more than once. I had clarified they are one time use and after he injects to discard directly into the sharps container. He was sent only a 1 month supply and clarified for a new medication they typically won't sent a full 3 month supply in order to assess for efficacy first. Shared it takes at least 3 injections to impact lipid panel, if he were to have a repeat lipid panel done to do so after his dose on 5/26.  He thanked me for the call, assured me he is doing well and had no difficulty with self-injecting; agreeable to brief check in from clinical pharmacist team.

## 2024-05-10 NOTE — PROGRESS NOTES
Confirmed with  Jevon Barnard, PharmD ok that Manan is not currently on Zetia. To continue Repatha and repeat lipid panel to further assess need of Zetia with next visit.

## 2024-05-17 NOTE — PROGRESS NOTES
RECERTING FOR HOME CARE, 1VISIT EVERY 2 WEEKS AND 2 PRN VISITS FOR [facility], PLEASE REACH OUT TO ME IF YOU HAVE ANY INFORMATION ABOUT INSURANCE COST AND HOW MUCH BEFORE THEY MEET THE YEARLY DEDUCTIBLES. THANKS Dr. Suazo

## 2024-06-03 ENCOUNTER — PHARMACY VISIT (OUTPATIENT)
Dept: PHARMACY | Facility: MEDICAL CENTER | Age: 59
End: 2024-06-03
Payer: COMMERCIAL

## 2024-06-11 ENCOUNTER — OFFICE VISIT (OUTPATIENT)
Dept: CARDIOLOGY | Facility: MEDICAL CENTER | Age: 59
End: 2024-06-11
Payer: COMMERCIAL

## 2024-06-11 VITALS
RESPIRATION RATE: 16 BRPM | HEART RATE: 92 BPM | SYSTOLIC BLOOD PRESSURE: 118 MMHG | HEIGHT: 72 IN | BODY MASS INDEX: 22.75 KG/M2 | OXYGEN SATURATION: 97 % | DIASTOLIC BLOOD PRESSURE: 74 MMHG | WEIGHT: 168 LBS

## 2024-06-11 DIAGNOSIS — I10 PRIMARY HYPERTENSION: ICD-10-CM

## 2024-06-11 DIAGNOSIS — I25.10 CORONARY ARTERY DISEASE DUE TO LIPID RICH PLAQUE: ICD-10-CM

## 2024-06-11 DIAGNOSIS — I25.83 CORONARY ARTERY DISEASE DUE TO LIPID RICH PLAQUE: ICD-10-CM

## 2024-06-11 DIAGNOSIS — I25.5 ISCHEMIC CARDIOMYOPATHY: ICD-10-CM

## 2024-06-11 DIAGNOSIS — E78.2 MIXED HYPERLIPIDEMIA: ICD-10-CM

## 2024-06-11 PROCEDURE — 3078F DIAST BP <80 MM HG: CPT

## 2024-06-11 PROCEDURE — 99214 OFFICE O/P EST MOD 30 MIN: CPT

## 2024-06-11 PROCEDURE — 3074F SYST BP LT 130 MM HG: CPT

## 2024-06-11 PROCEDURE — 99212 OFFICE O/P EST SF 10 MIN: CPT

## 2024-06-11 ASSESSMENT — ENCOUNTER SYMPTOMS
MUSCULOSKELETAL NEGATIVE: 1
NERVOUS/ANXIOUS: 0
DEPRESSION: 0
NEUROLOGICAL NEGATIVE: 1
PALPITATIONS: 0
GASTROINTESTINAL NEGATIVE: 1
CONSTITUTIONAL NEGATIVE: 1
SHORTNESS OF BREATH: 0
EYES NEGATIVE: 1
PND: 0
ORTHOPNEA: 0

## 2024-06-11 NOTE — PROGRESS NOTES
Chief Complaint   Patient presents with    Hyperlipidemia    Transient Ischemic Attack       Subjective     Manan Aviles is a 58 y.o. male who presents today for follow up. They have a history of  CVA, DM2, HTN, HLD, PAD s/p angioplasty c/b osteomyelitis s/p LLE BKA 12/20/19; incidentally found new HF 30-35% now recovered and obstructive multivessel CAD      They are accompanied today by his wife      Seen by myself on 12/8/2023 he denies any cardiac complaints, feeling well overall. He goes on walks, he is able to go for 1 mile at a time. He has completed physical therapy, walks with a cane     Today 6/11/2024 he is feeling really well overall. Going to the gym twice a week. He is walking about 1000 steps at a time.  He does have some difficulty with balance and uses a cane    Past Medical History:   Diagnosis Date    CAD (coronary artery disease)     Diabetes (HCC)     Hyperlipidemia     Hypertension      Past Surgical History:   Procedure Laterality Date    KNEE AMPUTATION BELOW Left 12/20/2019    Procedure: AMPUTATION, BELOW KNEE;  Surgeon: Koby Zhao M.D.;  Location: SURGERY [facility];  Service: Orthopedics    Z CARDIAC CATH  12/16/2019    multi-vessel disease    IRRIGATION & DEBRIDEMENT ORTHO Left 6/24/2019    Procedure: IRRIGATION AND DEBRIDEMENT, WOUND-FOOT, BIOLOGIC PLACEMENT, WOUND VAC PLACEMENT;  Surgeon: Charles Chopra M.D.;  Location: SURGERY [facility];  Service: Orthopedics     Family History   Problem Relation Age of Onset    Diabetes Mother     Diabetes Father      Social History     Socioeconomic History    Marital status:      Spouse name: Not on file    Number of children: Not on file    Years of education: Not on file    Highest education level: Not on file   Occupational History    Not on file   Tobacco Use    Smoking status: Never    Smokeless tobacco: Never   Vaping Use    Vaping status: Never Used   Substance and Sexual Activity    Alcohol use: No    Drug  use: No    Sexual activity: Not on file   Other Topics Concern    Not on file   Social History Narrative    Not on file     Social Determinants of Health     Financial Resource Strain: Not on file   Food Insecurity: Not on file   Transportation Needs: Not on file   Physical Activity: Not on file   Stress: Not on file   Social Connections: Not on file   Intimate Partner Violence: Not on file   Housing Stability: Not on file     No Known Allergies  Outpatient Encounter Medications as of 6/11/2024   Medication Sig Dispense Refill    glipiZIDE ER (GLUCOTROL XL) 2.5 MG TABLET SR 24 HR Take 2 Tablets by mouth 2 times a day. 120 Tablet 11    atorvastatin (LIPITOR) 80 MG tablet Take 1 Tablet by mouth every day. FOR 90 DAYS 90 Tablet 4    metFORMIN (GLUCOPHAGE) 500 MG Tab Take 2 Tablets by mouth 2 times a day with meals. 360 Tablet 1    clopidogrel (PLAVIX) 75 MG Tab Take 1 Tablet by mouth every day. 100 Tablet 3    ezetimibe (ZETIA) 10 MG Tab Take 1 Tablet by mouth at bedtime. 100 Tablet 3    metoprolol SR (TOPROL XL) 25 MG TABLET SR 24 HR Take 1 tablet by mouth twice daily, or as directed. 200 Tablet 3    dapagliflozin propanediol (FARXIGA) 10 MG Tab Take 1 Tablet by mouth every day. (Patient not taking: Reported on 6/11/2024) 90 Tablet 3    Evolocumab (REPATHA) Solution Prefilled Syringe SubQ injection syringe Inject 1 mL under the skin every 14 days. (Patient not taking: Reported on 6/11/2024) 6 mL 1    [DISCONTINUED] Semaglutide (RYBELSUS) 3 MG Tab Take 3 mg by mouth every day. 90 Tablet 1    [DISCONTINUED] glipiZIDE SR (GLUCOTROL) 10 MG TABLET SR 24 HR Take 1 Tablet by mouth every day. 90 Tablet 3     No facility-administered encounter medications on file as of 6/11/2024.     Review of Systems   Constitutional: Negative.    HENT: Negative.     Eyes: Negative.    Respiratory:  Negative for shortness of breath.    Cardiovascular:  Negative for chest pain, palpitations, orthopnea, leg swelling and PND.   Gastrointestinal:  Negative.    Genitourinary: Negative.    Musculoskeletal: Negative.    Skin: Negative.    Neurological: Negative.    Endo/Heme/Allergies: Negative.    Psychiatric/Behavioral:  Negative for depression. The patient is not nervous/anxious.               Objective     /74 (BP Location: Left arm, Patient Position: Sitting, BP Cuff Size: Adult)   Pulse 92   Resp 16   Ht 1.829 m (6')   Wt 76.2 kg (168 lb)   SpO2 97%   BMI 22.78 kg/m²     Physical Exam  Constitutional:       Appearance: Normal appearance.   HENT:      Head: Normocephalic.   Neck:      Vascular: No JVD.   Cardiovascular:      Rate and Rhythm: Normal rate and regular rhythm.      Pulses: Normal pulses.      Heart sounds: Normal heart sounds. No murmur heard.     No friction rub.   Pulmonary:      Effort: Pulmonary effort is normal.      Breath sounds: Normal breath sounds.   Abdominal:      Palpations: Abdomen is soft.   Musculoskeletal:         General: Normal range of motion.      Right lower leg: No edema.      Left lower leg: No edema.   Skin:     General: Skin is warm and dry.   Neurological:      General: No focal deficit present.      Mental Status: He is alert and oriented to person, place, and time.   Psychiatric:         Mood and Affect: Mood normal.         Behavior: Behavior normal.            Lab Results   Component Value Date/Time    CHOLSTRLTOT 239 (H) 04/04/2024 10:27 AM     (H) 04/04/2024 10:27 AM    HDL 46 04/04/2024 10:27 AM    TRIGLYCERIDE 171 (H) 04/04/2024 10:27 AM       Lab Results   Component Value Date/Time    SODIUM 139 04/04/2024 10:27 AM    POTASSIUM 4.4 04/04/2024 10:27 AM    CHLORIDE 102 04/04/2024 10:27 AM    CO2 25 04/04/2024 10:27 AM    GLUCOSE 202 (H) 04/04/2024 10:27 AM    BUN 20 04/04/2024 10:27 AM    CREATININE 0.72 04/04/2024 10:27 AM    CREATININE 0.9 05/09/2007 01:20 AM    BUNCREATRAT 16 02/15/2023 06:56 AM     Lab Results   Component Value Date/Time    ALKPHOSPHAT 75 04/04/2024 10:27 AM    ASTSGOT 10  (L) 04/04/2024 10:27 AM    ALTSGPT 5 04/04/2024 10:27 AM    TBILIRUBIN 0.4 04/04/2024 10:27 AM          Assessment & Plan     1. Ischemic cardiomyopathy        2. Coronary artery disease due to lipid rich plaque        3. Primary hypertension        4. Mixed hyperlipidemia            Medical Decision Making: Today's Assessment/Status/Plan:        History of ischemic cardiomyopathy now with recovered ejection fraction 60%  CAD  -Continue metoprolol 25 mg daily, Farxiga 10 mg daily, continue Plavix, atorvastatin 80 mg daily with Zetia 10 mg daily  We addressed the management of atherosclerotic artery disease.  He is on proper antiplatelet, cholesterol management and beta-blockers as appropriate.  We addressed the potential side effects and laboratory follow-up for these medications.     Hypertension  - good control   - continue medications as above  - goal < 130/80     Hyperlipidemia  -Most recent   -He had been given 1 sample of Repatha, he has only taken it 1 time  -I have reordered his Repatha, okay to switch to Praluent if preferable by his insurance  -Continue atorvastatin and Zetia for now  -Recheck lipid panel in 3 months     Follow-up with YAMILKA Farias in 6 months     This note was dictated using Dragon speech recognition software.

## 2024-07-08 ENCOUNTER — APPOINTMENT (OUTPATIENT)
Dept: MEDICAL GROUP | Facility: PHYSICIAN GROUP | Age: 59
End: 2024-07-08
Payer: COMMERCIAL

## 2024-07-15 ENCOUNTER — DOCUMENTATION (OUTPATIENT)
Dept: PHARMACY | Facility: MEDICAL CENTER | Age: 59
End: 2024-07-15
Payer: COMMERCIAL

## 2024-07-17 ENCOUNTER — TELEPHONE (OUTPATIENT)
Dept: VASCULAR LAB | Facility: MEDICAL CENTER | Age: 59
End: 2024-07-17
Payer: COMMERCIAL

## 2024-07-17 PROCEDURE — RXMED WILLOW AMBULATORY MEDICATION CHARGE

## 2024-07-18 ENCOUNTER — PHARMACY VISIT (OUTPATIENT)
Dept: PHARMACY | Facility: MEDICAL CENTER | Age: 59
End: 2024-07-18
Payer: COMMERCIAL

## 2024-08-15 ENCOUNTER — DOCUMENTATION (OUTPATIENT)
Dept: PHARMACY | Facility: MEDICAL CENTER | Age: 59
End: 2024-08-15
Payer: COMMERCIAL

## 2024-08-15 NOTE — PROGRESS NOTES
Follow Up Assessment   Dx: Mixed hyperlipidemia [E78.2]  Secondary Prevention of clinical ASCVD   Provider specified goal < 55 - 70 mg/dL      Tx prescribed: Repatha 140mg/mL pre-filled syringe injecting 1mL (140mg) under the skin every 14 days + [Atorvastatin 80mg once daily + Ezetimibe 10mg once daily]    - Administration: self-injecting Repatha #1 syringe under the skin every 2 weeks; reported no administration challenges     Adherence: no missed doses; discussed importance to inject every 2 weeks using calendar or planner to help improve adherence    - Missed dose mgmt: N/A as no missed doses       Current SE: none    - Mitigation/Mgmt: N/A as no SE reported       List Changes to Allergies, Diagnoses: none        Current S/Sx: none   New cardio events since we last spoke: 0  Clinically Relevant, Abnormal Labs: no new labs since last assessment     - BP/HR: WNL (6/11/24)     Wellness/Lifestyle Counseling: primarily gets exercise through yardwork and walks ~1 mile/day; follows vegetarian diet and does supplement with protein shakes   - Support/QOL: no missed ADLs, stable QOL   - Immunizations: declined immunizations       Med Rec/Updated drug list:    - EMR accurate, no medication list inaccuracies. Reviewed with patient.   DI Check: none clinically significant          Goals of Therapy:    - Achieve goal LDL levels (< mg/dL; provider specified goal < 55 - 70 mg/dL) to reduce risk of cardiovascular events   - Implement lifestyle modifications: diet, exercise, weight loss, and smoking cessation  Progression toward goals - no new labs since last assessment; Manan is aware he is overdue and will try to get labs completed 8/16; he did miss injections from May - July 2024; following lifestyle modifications as noted above   - Patient has agreed/understands to goals of therapy during education/counseling       Additional: I reached Manan for check-in regarding his use of Repatha for lipid management. He is known to  have compliance challenges where he missed injections from May - July 2024. He did confirm completing both injections with July's refill shipment and Rx Coordinator was notified to help schedule refill. Manan noted he will try to get in for lab completion 8/16/24. Encouraged strict medication adherence every 2 weeks for maximal benefit and encouraged use of calendar/planner to organize injection dates as he had difficulty recalling when he injected last. He verbalized intent to be more diligent and had no further questions.

## 2024-08-16 ENCOUNTER — HOSPITAL ENCOUNTER (OUTPATIENT)
Dept: LAB | Facility: MEDICAL CENTER | Age: 59
End: 2024-08-16
Attending: NURSE PRACTITIONER
Payer: COMMERCIAL

## 2024-08-16 ENCOUNTER — PHARMACY VISIT (OUTPATIENT)
Dept: PHARMACY | Facility: MEDICAL CENTER | Age: 59
End: 2024-08-16
Payer: COMMERCIAL

## 2024-08-16 DIAGNOSIS — I25.10 CORONARY ARTERY DISEASE DUE TO LIPID RICH PLAQUE: ICD-10-CM

## 2024-08-16 DIAGNOSIS — E11.65 UNCONTROLLED TYPE 2 DIABETES MELLITUS WITH HYPERGLYCEMIA (HCC): ICD-10-CM

## 2024-08-16 DIAGNOSIS — I25.83 CORONARY ARTERY DISEASE DUE TO LIPID RICH PLAQUE: ICD-10-CM

## 2024-08-16 LAB
ALBUMIN SERPL BCP-MCNC: 3.9 G/DL (ref 3.2–4.9)
ALBUMIN/GLOB SERPL: 1.2 G/DL
ALP SERPL-CCNC: 73 U/L (ref 30–99)
ALT SERPL-CCNC: 11 U/L (ref 2–50)
ANION GAP SERPL CALC-SCNC: 12 MMOL/L (ref 7–16)
AST SERPL-CCNC: 13 U/L (ref 12–45)
BILIRUB SERPL-MCNC: 0.3 MG/DL (ref 0.1–1.5)
BUN SERPL-MCNC: 16 MG/DL (ref 8–22)
CALCIUM ALBUM COR SERPL-MCNC: 8.6 MG/DL (ref 8.5–10.5)
CALCIUM SERPL-MCNC: 8.5 MG/DL (ref 8.5–10.5)
CHLORIDE SERPL-SCNC: 105 MMOL/L (ref 96–112)
CHOLEST SERPL-MCNC: 96 MG/DL (ref 100–199)
CO2 SERPL-SCNC: 25 MMOL/L (ref 20–33)
CREAT SERPL-MCNC: 0.63 MG/DL (ref 0.5–1.4)
EST. AVERAGE GLUCOSE BLD GHB EST-MCNC: 217 MG/DL
FASTING STATUS PATIENT QL REPORTED: NORMAL
GFR SERPLBLD CREATININE-BSD FMLA CKD-EPI: 110 ML/MIN/1.73 M 2
GLOBULIN SER CALC-MCNC: 3.2 G/DL (ref 1.9–3.5)
GLUCOSE SERPL-MCNC: 153 MG/DL (ref 65–99)
HBA1C MFR BLD: 9.2 % (ref 4–5.6)
HDLC SERPL-MCNC: 47 MG/DL
LDLC SERPL CALC-MCNC: 32 MG/DL
POTASSIUM SERPL-SCNC: 4.6 MMOL/L (ref 3.6–5.5)
PROT SERPL-MCNC: 7.1 G/DL (ref 6–8.2)
SODIUM SERPL-SCNC: 142 MMOL/L (ref 135–145)
TRIGL SERPL-MCNC: 87 MG/DL (ref 0–149)

## 2024-08-16 PROCEDURE — 80053 COMPREHEN METABOLIC PANEL: CPT

## 2024-08-16 PROCEDURE — RXMED WILLOW AMBULATORY MEDICATION CHARGE

## 2024-08-16 PROCEDURE — 82172 ASSAY OF APOLIPOPROTEIN: CPT

## 2024-08-16 PROCEDURE — 83036 HEMOGLOBIN GLYCOSYLATED A1C: CPT

## 2024-08-16 PROCEDURE — 36415 COLL VENOUS BLD VENIPUNCTURE: CPT

## 2024-08-16 PROCEDURE — 80061 LIPID PANEL: CPT

## 2024-08-16 PROCEDURE — 83695 ASSAY OF LIPOPROTEIN(A): CPT

## 2024-08-19 ENCOUNTER — APPOINTMENT (OUTPATIENT)
Dept: MEDICAL GROUP | Facility: PHYSICIAN GROUP | Age: 59
End: 2024-08-19
Payer: COMMERCIAL

## 2024-08-19 VITALS — HEART RATE: 85 BPM | OXYGEN SATURATION: 97 % | HEIGHT: 72 IN | BODY MASS INDEX: 22.75 KG/M2 | WEIGHT: 168 LBS

## 2024-08-19 DIAGNOSIS — E11.65 UNCONTROLLED TYPE 2 DIABETES MELLITUS WITH HYPERGLYCEMIA (HCC): ICD-10-CM

## 2024-08-19 LAB — APO B100 SERPL-MCNC: 47 MG/DL (ref 66–133)

## 2024-08-19 PROCEDURE — 99401 PREV MED CNSL INDIV APPRX 15: CPT

## 2024-08-19 NOTE — PROGRESS NOTES
Patient Consult Note       TIME IN: 1112 am   TIME OUT: 1137 am      Primary care physician: Donny Mcnally M.D.    Reason for consult: Management of Uncontrolled Type 2 Diabetes    HPI:  Israel Aviles is a 56 y.o. old patient who comes in today for evaluation of above stated problem.    Allergies:  Patient has no known allergies.    Most Recent labs, A1c, and immunizations:    Lab Results   Component Value Date/Time    HBA1C 9.2 (H) 08/16/2024 08:50 AM      Latest Reference Range & Units 06/15/23 10:06 09/15/23 09:49 04/04/24 10:27 08/16/24 08:50   Glycohemoglobin 4.0 - 5.6 % 9.3 (H) 8.5 (H) 9.5 (H) 9.2 (H)   (H): Data is abnormally high     Latest Reference Range & Units 02/15/23 06:56   LDL Chol Calc (NIH) 0 - 99 mg/dL 161 (H)   (H): Data is abnormally high         Lab Results   Component Value Date/Time    CHOLSTRLTOT 96 (L) 08/16/2024 08:50 AM    LDL 32 08/16/2024 08:50 AM    HDL 47 08/16/2024 08:50 AM    TRIGLYCERIDE 87 08/16/2024 08:50 AM       Lab Results   Component Value Date/Time    SODIUM 142 08/16/2024 08:50 AM    POTASSIUM 4.6 08/16/2024 08:50 AM    CHLORIDE 105 08/16/2024 08:50 AM    CO2 25 08/16/2024 08:50 AM    GLUCOSE 153 (H) 08/16/2024 08:50 AM    BUN 16 08/16/2024 08:50 AM    CREATININE 0.63 08/16/2024 08:50 AM    CREATININE 0.9 05/09/2007 01:20 AM    BUNCREATRAT 16 02/15/2023 06:56 AM     Lab Results   Component Value Date/Time    ALKPHOSPHAT 73 08/16/2024 08:50 AM    ASTSGOT 13 08/16/2024 08:50 AM    ALTSGPT 11 08/16/2024 08:50 AM    TBILIRUBIN 0.3 08/16/2024 08:50 AM    INR 1.05 02/17/2022 09:46 AM    ALBUMIN 3.9 08/16/2024 08:50 AM      Lab Results   Component Value Date/Time    MALBCRT 20 06/15/2023 10:06 AM    MICROALBUR 1.6 06/15/2023 10:06 AM     Medications:    Current Outpatient Medications:     Evolocumab, 140 mg, Subcutaneous, Q14 DAYS, Taking    glipiZIDE ER, 5 mg, Oral, BID, Taking    atorvastatin, 80 mg, Oral, DAILY, Taking    metFORMIN, 1,000 mg, Oral, BID WITH  MEALS, Taking    clopidogrel, 75 mg, Oral, DAILY    metoprolol SR, Take 1 tablet by mouth twice daily, or as directed.    Physical Examination:  Vital signs: Pulse 85   Ht 1.829 m (6')   Wt 76.2 kg (168 lb)   SpO2 97%   BMI 22.78 kg/m²  Body mass index is 22.78 kg/m².    Change in weight: Stable  Exercise habits: minimal exercise   Diet: DASH diet    Assessment and Plan:  1. DM2   A1C above goal  He will likely need a GLP-1 and/or insulin to get A1c to goal   He continues to decline injectable drugs for diabetes  Constipation with Trulicity in the past  Since his last appointment he has stopped Farxiga  Declined restarting it today  Declined trying Rybelsus  Consider long-acting insulin at next appointment  They declined today  Consider Actos  They declined today  Medication(s) recommended:   Continue glipizide 5 mg twice daily  Continue metformin 1000 mg twice daily      -Blood glucose and A1c target      2.  Cardiovascular  LDL above goal of <55-70 with CAD, TIA, and  PVD  Discussed the need for lifetime cholesterol management including long-term use of Lipitor and Repatha  Medication(s) recommended.   Continue with Lipitor 80mg daily   Stop Zetia 10mg daily   Continue Repatha 140 mg every 14 days  Stopped ACE,   Declined retrial       3.  Lifestyle changes   Focus on eating a DASH/Mediterranean-style diet.   Exercise 30 minutes daily at least 5 days/week, as tolerated.  https://www.niddk.nih.gov/bwp      Follow-up appointment in 12 week(s)    Jevon Barnard, PharmD, MS, BCACP, LCC  [facility]  Phone [phone] fax [phone]    This note was created using voice recognition software (Dragon). The accuracy of the dictation is limited by the abilities of the software. I have reviewed the note prior to signing, however some errors in grammar and context are still possible. If you have any questions related to this note please do not hesitate to contact our office.     CC:    Donny Mcnally M.D.  No ref. provider found  Dr. Jude Dallas

## 2024-08-20 LAB — LPA SERPL-MCNC: 125 MG/DL

## 2024-09-04 ENCOUNTER — APPOINTMENT (OUTPATIENT)
Dept: MEDICAL GROUP | Facility: OTHER | Age: 59
End: 2024-09-04
Payer: COMMERCIAL

## 2024-09-04 VITALS
OXYGEN SATURATION: 97 % | WEIGHT: 168 LBS | DIASTOLIC BLOOD PRESSURE: 70 MMHG | HEIGHT: 72 IN | TEMPERATURE: 98.6 F | HEART RATE: 84 BPM | BODY MASS INDEX: 22.75 KG/M2 | SYSTOLIC BLOOD PRESSURE: 92 MMHG

## 2024-09-04 DIAGNOSIS — Z00.00 ENCOUNTER FOR PREVENTIVE CARE: Primary | ICD-10-CM

## 2024-09-04 DIAGNOSIS — E78.2 MIXED HYPERLIPIDEMIA: ICD-10-CM

## 2024-09-04 DIAGNOSIS — S88.112S BELOW-KNEE AMPUTATION OF LEFT LOWER EXTREMITY, SEQUELA (HCC): ICD-10-CM

## 2024-09-04 DIAGNOSIS — M62.830 SPASM OF THORACIC BACK MUSCLE: ICD-10-CM

## 2024-09-04 DIAGNOSIS — I25.10 CORONARY ARTERY DISEASE DUE TO LIPID RICH PLAQUE: ICD-10-CM

## 2024-09-04 DIAGNOSIS — E11.42 DIABETIC POLYNEUROPATHY ASSOCIATED WITH TYPE 2 DIABETES MELLITUS (HCC): ICD-10-CM

## 2024-09-04 DIAGNOSIS — I25.83 CORONARY ARTERY DISEASE DUE TO LIPID RICH PLAQUE: ICD-10-CM

## 2024-09-04 PROCEDURE — 3078F DIAST BP <80 MM HG: CPT | Performed by: FAMILY MEDICINE

## 2024-09-04 PROCEDURE — 99396 PREV VISIT EST AGE 40-64: CPT | Performed by: FAMILY MEDICINE

## 2024-09-04 PROCEDURE — 3074F SYST BP LT 130 MM HG: CPT | Performed by: FAMILY MEDICINE

## 2024-09-04 RX ORDER — CYCLOBENZAPRINE HCL 10 MG
TABLET ORAL
Qty: 30 TABLET | Refills: 0 | Status: SHIPPED | OUTPATIENT
Start: 2024-09-04

## 2024-09-04 NOTE — ASSESSMENT & PLAN NOTE
Lipid panel of 8/16/2024 reviewed: Total chol 96, LDL 32.   Stable lipid problem.   Continue high dose Atorvastatin daily.   Continue Repatha injections through [facility].

## 2024-09-04 NOTE — PROGRESS NOTES
[facility] MEDICINE     PATIENT ID:  NAME:  Israel Aviles  MRN:               [id number]  YOB: 1965    Date: 3:45 PM      CC:  annual wellness exam      HPI: Israel Aviles is a 58 y.o. male who presented with his usual concerns and also here for an annual exam and some muscle spasms.     Problem   Spasm of Thoracic Back Muscle    He hurt his right back, thoracic area.  No particular injury or trauma.  It may be a little worse.  Radiates around to front chest wall.  He is active, exercises and does yoga.  Sleeps ok.      Encounter for Preventive Care    Here for annual wellness exam.  Several of his ongoing chronic issues were also addressed including heart disease.  Not interested in colon cancer screening and neither is his wife.  He does exercise regularly.      Mixed Hyperlipidemia    CAD in picture of mixed hyperlipidemia.  Still taking Atorvastatin daily, and now also getting Repatha injections from [facility].      Below-Knee Amputation (Hcc)    Left lower extremity prosthesis is in good shape, and possibly needs an appropriate shoe.  He uses [facility].      Cad (Coronary Artery Disease)    Has known cardiac disease, and a past history of MI and was a candidate for CABG in past.  Has risk factors of Diabetes and Dyslipidemia, PAD, prior Stroke.  Denies any chest pain, dyspnea.  He is on maximal medical therapy including Atorvastatin and Repatha.  Has Cardiology follow-up in December this year.  Has no chest pain or dyspnea.      Diabetic Polyneuropathy Associated With Type 2 Diabetes Mellitus (Hcc)    Monofilament exam performed 4/12/2024 on right foot and was normal; .  Has had prior left lower extremity amputation, an AKA.          REVIEW OF SYSTEMS:   Ten systems reviewed and were negative except as noted in the HPI.                PROBLEM LIST  Patient Active Problem List   Diagnosis    Uncontrolled type 2 diabetes mellitus with hyperglycemia (HCC)    PVD (peripheral  vascular disease) (HCC)    Diabetic polyneuropathy associated with type 2 diabetes mellitus (HCC)    Pressure injury of deep tissue of left foot    Osteomyelitis of left foot (HCC)    Vitamin D deficiency    CAD (coronary artery disease)    HTN (hypertension)    Orthostatic hypotension    Below-knee amputation (HCC)    Adjustment disorder with depressed mood    Ischemic cardiomyopathy    Body mass index (BMI) 23.0-23.9, adult    TIA (transient ischemic attack)    Colon cancer screening declined    Anemia    Right sided cerebral hemisphere cerebrovascular accident (CVA) (HCC)    Late effect of stroke    Anxiety    Mixed hyperlipidemia    Encounter for preventive care    Spasm of thoracic back muscle        PAST SURGICAL HISTORY:  Past Surgical History:   Procedure Laterality Date    KNEE AMPUTATION BELOW Left 12/20/2019    Procedure: AMPUTATION, BELOW KNEE;  Surgeon: Koby Zhao M.D.;  Location: SURGERY [facility];  Service: Orthopedics    [facility] CARDIAC CATH  12/16/2019    multi-vessel disease    IRRIGATION & DEBRIDEMENT ORTHO Left 6/24/2019    Procedure: IRRIGATION AND DEBRIDEMENT, WOUND-FOOT, BIOLOGIC PLACEMENT, WOUND VAC PLACEMENT;  Surgeon: Charles Chopra M.D.;  Location: SURGERY [facility];  Service: Orthopedics       FAMILY HISTORY:  Family History   Problem Relation Age of Onset    Diabetes Mother     Diabetes Father        SOCIAL HISTORY:   Social History     Tobacco Use    Smoking status: Never    Smokeless tobacco: Never   Substance Use Topics    Alcohol use: No       ALLERGIES:  No Known Allergies    OUTPATIENT MEDICATIONS:    Current Outpatient Medications:     cyclobenzaprine (FLEXERIL) 10 mg Tab, Take one or one half tablet by mouth at bedtime for muscle spasm. May make drowsy., Disp: 30 Tablet, Rfl: 0    Evolocumab (REPATHA) Solution Prefilled Syringe SubQ injection syringe, Inject 1 mL under the skin every 14 days., Disp: 6 mL, Rfl: 4    glipiZIDE ER (GLUCOTROL XL) 2.5 MG TABLET SR 24  HR, Take 2 Tablets by mouth 2 times a day., Disp: 120 Tablet, Rfl: 11    atorvastatin (LIPITOR) 80 MG tablet, Take 1 Tablet by mouth every day. FOR 90 DAYS, Disp: 90 Tablet, Rfl: 4    metFORMIN (GLUCOPHAGE) 500 MG Tab, Take 2 Tablets by mouth 2 times a day with meals., Disp: 360 Tablet, Rfl: 1    clopidogrel (PLAVIX) 75 MG Tab, Take 1 Tablet by mouth every day., Disp: 100 Tablet, Rfl: 3    metoprolol SR (TOPROL XL) 25 MG TABLET SR 24 HR, Take 1 tablet by mouth twice daily, or as directed., Disp: 200 Tablet, Rfl: 3    PHYSICAL EXAM:  Vitals:    09/04/24 1003   BP: 92/70   Pulse: 84   Temp: 37 °C (98.6 °F)   SpO2: 97%   Weight: 76.2 kg (168 lb)   Height: 1.829 m (6')       General: Pt resting in NAD, cooperative   Skin:  Pink, warm and dry.  HEENT: NC/AT. EOMI.  Lungs:  Symmetrical.  CTAB, good air movement   Cardiovascular:  normal S1/S2; RRR without murmur or gallop.   Abdomen:  Abdomen is soft, nontender, no masses or organomegaly  Extremities:  Full range of motion.  Left BKA with prosthesis.   CNS:  Muscle tone is normal. No gross focal neurologic deficits      ASSESSMENT/PLAN:   58 y.o. male     Problem List Items Addressed This Visit       Below-knee amputation (HCC)     Referral placed for [facility].          Relevant Orders    DME Other    CAD (coronary artery disease)     Lipid panel of 8/16/2024 reviewed: Total chol 96, LDL 32.   Stable lipid problem.   Continue high dose Atorvastatin daily.   Continue Repatha injections through [facility].                Diabetic polyneuropathy associated with type 2 diabetes mellitus (HCC)     Continue current medication regimen and also seeing diabetes pharmacist specialist.   Status left BKA.          Relevant Medications    cyclobenzaprine (FLEXERIL) 10 mg Tab    Encounter for preventive care     Exercise: 150 minutes a week of vigorous exercise is recommended for good health, as able.   Declines colonoscopy.   Declines HIV, Hep C screening.             Mixed hyperlipidemia     Lipid panel of 8/16/2024 reviewed: Total chol 96, LDL 32.   Stable lipid problem.   Continue high dose Atorvastatin daily.   May refill this maintenance medication for one year as needed.    Continue Repatha injections.            Spasm of thoracic back muscle     Short trial of muscle relaxant, Cyclobenzaprine..   Common adverse reactions of this medication were discussed with patient.            Relevant Medications    cyclobenzaprine (FLEXERIL) 10 mg Tab       Donny Mcnally MD  [facility]

## 2024-09-04 NOTE — ASSESSMENT & PLAN NOTE
Exercise: 150 minutes a week of vigorous exercise is recommended for good health, as able.   Declines colonoscopy.   Declines HIV, Hep C screening.

## 2024-09-04 NOTE — ASSESSMENT & PLAN NOTE
Continue current medication regimen and also seeing diabetes pharmacist specialist.   Status left BKA.

## 2024-09-04 NOTE — ASSESSMENT & PLAN NOTE
Short trial of muscle relaxant, Cyclobenzaprine..   Common adverse reactions of this medication were discussed with patient.

## 2024-09-04 NOTE — ASSESSMENT & PLAN NOTE
Lipid panel of 8/16/2024 reviewed: Total chol 96, LDL 32.   Stable lipid problem.   Continue high dose Atorvastatin daily.   May refill this maintenance medication for one year as needed.    Continue Repatha injections.

## 2024-09-13 PROCEDURE — RXMED WILLOW AMBULATORY MEDICATION CHARGE

## 2024-09-18 ENCOUNTER — PHARMACY VISIT (OUTPATIENT)
Dept: PHARMACY | Facility: MEDICAL CENTER | Age: 59
End: 2024-09-18
Payer: COMMERCIAL

## 2024-10-07 PROCEDURE — RXMED WILLOW AMBULATORY MEDICATION CHARGE

## 2024-10-08 ENCOUNTER — PHARMACY VISIT (OUTPATIENT)
Dept: PHARMACY | Facility: MEDICAL CENTER | Age: 59
End: 2024-10-08
Payer: COMMERCIAL

## 2024-11-04 PROCEDURE — RXMED WILLOW AMBULATORY MEDICATION CHARGE

## 2024-11-05 ENCOUNTER — PHARMACY VISIT (OUTPATIENT)
Dept: PHARMACY | Facility: MEDICAL CENTER | Age: 59
End: 2024-11-05
Payer: COMMERCIAL

## 2024-11-06 DIAGNOSIS — S88.112S BELOW-KNEE AMPUTATION OF LEFT LOWER EXTREMITY, SEQUELA (HCC): ICD-10-CM

## 2024-11-19 ENCOUNTER — TELEPHONE (OUTPATIENT)
Dept: MEDICAL GROUP | Facility: CLINIC | Age: 59
End: 2024-11-19
Payer: COMMERCIAL

## 2024-11-19 NOTE — TELEPHONE ENCOUNTER
Lennie from [facility] called the clinic today asking about standard written order  they had sent us . They are asking if we can complete it and fax to [phone]

## 2024-11-20 DIAGNOSIS — E11.65 UNCONTROLLED TYPE 2 DIABETES MELLITUS WITH HYPERGLYCEMIA (HCC): ICD-10-CM

## 2024-11-21 RX ORDER — GLIPIZIDE 2.5 MG/1
5 TABLET, EXTENDED RELEASE ORAL 2 TIMES DAILY
Qty: 120 TABLET | Refills: 11 | Status: SHIPPED | OUTPATIENT
Start: 2024-11-21

## 2024-11-21 NOTE — TELEPHONE ENCOUNTER
Received request via: Pharmacy    Was the patient seen in the last year in this department? Yes    Does the patient have an active prescription (recently filled or refills available) for medication(s) requested? No    Pharmacy Name: [facility] [address] [place] [address], [place] - [address]      Does the patient have [facility] and need 100-day supply? (This applies to ALL medications) Patient does not have SCP

## 2024-12-03 ENCOUNTER — TELEPHONE (OUTPATIENT)
Dept: CARDIOLOGY | Facility: MEDICAL CENTER | Age: 59
End: 2024-12-03
Payer: COMMERCIAL

## 2024-12-03 PROCEDURE — RXMED WILLOW AMBULATORY MEDICATION CHARGE

## 2024-12-04 ENCOUNTER — DOCUMENTATION (OUTPATIENT)
Dept: PHARMACY | Facility: MEDICAL CENTER | Age: 59
End: 2024-12-04
Payer: COMMERCIAL

## 2024-12-04 NOTE — PROGRESS NOTES
Follow Up Assessment   Dx: Mixed hyperlipidemia [E78.2]   Secondary Prevention of clinical ASCVD   Provider specified goal < 55 - 70 mg/dL      Tx prescribed: Repatha 140mg/mL syringe injecting 1mL (140mg) subcutaneously every 2 weeks + [Atorvastatin 80mg by mouth once daily]   - Administration: self-injecting Repatha #1 syringe under the skin; rotating injection sites at abdomen and thighs       Adherence: injecting on the 1st and 15th of each month; no reported missed doses    - Missed dose mgmt: N/A as no missed doses        Current SE: none    - Mitigation/Mgmt: N/A as no SE reported       List Changes to Allergies, Diagnoses: back muscle spasms 9/4/24 resolved with Cyclobenzaprine       Current S/Sx: none   New cardio events since we last spoke: none   Clinically Relevant, Abnormal Labs from 8/16/24:     - CBC: no new results    - Chem7: Glucose 153 (H)   - LFTs: WNL    - Lipids: TC 96 TG 87 HDL 47 LDL 32    - ApoB: 47   - Lp(a): 125   - A1c: 9.2    - HeFH/HoFH: N   - BP/HR: WNL (9/4/24)    Wellness/Lifestyle Counseling: maintaining exercise routine walking 1 mile/day; discussed limiting foods high in sugar, eating smaller portions spread throughout the day, being mindful on when and how many carbs he eats; following vegetarian diet    - Support/QOL: stable QOL, no new concerns     - Immunizations: previously declined immunizations       Med Rec/Updated drug list:    - EMR inaccurate, medication changes reviewed with patient:    ? (-) Cyclobenzaprine     DI Check: no clinically significant DDIs         Goals of Therapy:    - Promote or maintain optimal therapy administration and adherence   - Mitigation of side effects   - Achieve goal LDL levels (< mg/dL; specified LDL goal < 55 - 70 mg/dL) to reduce risk of cardiovascular events    - Implement lifestyle modifications: diet, exercise, weight loss, and/or smoking cessation   Progression toward goals - improved medication adherence with no missed doses or  side effects; no new CV events; reviewed nutritional support with emphasis on blood sugar control    - Patient has agreed/understands to goals of therapy during education/counseling       Additional: I reached Manan for check-in support regarding his use of Repatha. Manan has done well injecting Repatha 2x/month on the 1st and 15th of each month with improved medication adherence. His LDL has decreased accordingly from 159 to 32 on 8/16/24. Ezetimibe was discontinued August 2024. He shared prior back muscle spasms resolved with Cyclobenzaprine use where he no longer needs Cyclobenzaprine. Nutritional support was discussed with Manan as A1c continues to be above goal. Manan previously declined recommendations to intensity diabetic regimen. He plans to complete lab work prior to upcoming diabetic + cardiology appointments mid-December where he may be more inclined for regimen intensification if A1c remains above goal. He thanked me for the call and had no further questions.   **Removed concern for non-adherence from high risk features with improved adherence since restarting 7/19/24**

## 2024-12-09 NOTE — DISCHARGE PLANNING
01/10/20  1414   Discharge Instructions - Completed by Case Mgmt   Discharge Location Home with Home Health   Agency Name / Address / Phone [facility] at [phone] (they will start visits on Monday 1/13/20)(they will call you to schedule   Home Health Registered Nurse; Physical Therapist   Medical equipment Provider / Phone [facility] at [phone]   Medical Equipment Ordered Wheelchair; cushion, stump board         Follow-Up       Follow-up With  Details  Why  Contact Info   Aleksander Chatterjee M.D. (Vascular surgery)  On 1/13/2020 Monday at 12:15 pm check in time (you will need to be fasting 6 hrs prior to this appointment)  [address] [place] [address]  [phone]       Mj Bai M.D.(Primary Care)    Does not accept your new insurance. You will need to schedule with a new PCP as soon as you can. I have provided list from online providers for [facility]  [address]  [place] [address]  [phone]       BANDAR Gomez (Cardiology)  On 1/28/2020 Tuesday at 9:00 am (records will be sent)  [address]  [address]  [place] [address]  [phone]       Koby Zhao M.D.(Orthopaedics)    Per his instruction.  [address] [place] [id number] [phone]              Spoke with , Barber.  Relayed results and recommendations per Dr. Huertas from recent carotid ultrasound.  Barber voiced understanding and will relay to Lola.  Encouraged her to call with any questions or concerns.  Recall entered for 2 years.

## 2024-12-12 ENCOUNTER — HOSPITAL ENCOUNTER (OUTPATIENT)
Dept: LAB | Facility: MEDICAL CENTER | Age: 59
End: 2024-12-12
Attending: FAMILY MEDICINE
Payer: COMMERCIAL

## 2024-12-12 DIAGNOSIS — E11.65 UNCONTROLLED TYPE 2 DIABETES MELLITUS WITH HYPERGLYCEMIA (HCC): ICD-10-CM

## 2024-12-12 PROCEDURE — 36415 COLL VENOUS BLD VENIPUNCTURE: CPT

## 2024-12-12 PROCEDURE — 83036 HEMOGLOBIN GLYCOSYLATED A1C: CPT

## 2024-12-12 PROCEDURE — 80061 LIPID PANEL: CPT

## 2024-12-12 PROCEDURE — 80053 COMPREHEN METABOLIC PANEL: CPT

## 2024-12-13 LAB
ALBUMIN SERPL BCP-MCNC: 4.3 G/DL (ref 3.2–4.9)
ALBUMIN/GLOB SERPL: 1.2 G/DL
ALP SERPL-CCNC: 79 U/L (ref 30–99)
ALT SERPL-CCNC: 14 U/L (ref 2–50)
ANION GAP SERPL CALC-SCNC: 14 MMOL/L (ref 7–16)
AST SERPL-CCNC: 18 U/L (ref 12–45)
BILIRUB SERPL-MCNC: 0.4 MG/DL (ref 0.1–1.5)
BUN SERPL-MCNC: 17 MG/DL (ref 8–22)
CALCIUM ALBUM COR SERPL-MCNC: 9.7 MG/DL (ref 8.5–10.5)
CALCIUM SERPL-MCNC: 9.9 MG/DL (ref 8.5–10.5)
CHLORIDE SERPL-SCNC: 101 MMOL/L (ref 96–112)
CHOLEST SERPL-MCNC: 123 MG/DL (ref 100–199)
CO2 SERPL-SCNC: 26 MMOL/L (ref 20–33)
CREAT SERPL-MCNC: 0.9 MG/DL (ref 0.5–1.4)
GFR SERPLBLD CREATININE-BSD FMLA CKD-EPI: 98 ML/MIN/1.73 M 2
GLOBULIN SER CALC-MCNC: 3.5 G/DL (ref 1.9–3.5)
GLUCOSE SERPL-MCNC: 164 MG/DL (ref 65–99)
HDLC SERPL-MCNC: 54 MG/DL
LDLC SERPL CALC-MCNC: 49 MG/DL
POTASSIUM SERPL-SCNC: 4.5 MMOL/L (ref 3.6–5.5)
PROT SERPL-MCNC: 7.8 G/DL (ref 6–8.2)
SODIUM SERPL-SCNC: 141 MMOL/L (ref 135–145)
TRIGL SERPL-MCNC: 102 MG/DL (ref 0–149)

## 2024-12-14 LAB
EST. AVERAGE GLUCOSE BLD GHB EST-MCNC: 229 MG/DL
HBA1C MFR BLD: 9.6 % (ref 4–5.6)

## 2024-12-16 ENCOUNTER — OFFICE VISIT (OUTPATIENT)
Dept: MEDICAL GROUP | Facility: PHYSICIAN GROUP | Age: 59
End: 2024-12-16
Payer: COMMERCIAL

## 2024-12-16 ENCOUNTER — PHARMACY VISIT (OUTPATIENT)
Dept: PHARMACY | Facility: MEDICAL CENTER | Age: 59
End: 2024-12-16
Payer: COMMERCIAL

## 2024-12-16 VITALS — WEIGHT: 168 LBS | BODY MASS INDEX: 22.75 KG/M2 | HEART RATE: 68 BPM | OXYGEN SATURATION: 97 % | HEIGHT: 72 IN

## 2024-12-16 DIAGNOSIS — I63.9 RIGHT SIDED CEREBRAL HEMISPHERE CEREBROVASCULAR ACCIDENT (CVA) (HCC): ICD-10-CM

## 2024-12-16 DIAGNOSIS — E11.69 TYPE 2 DIABETES MELLITUS WITH OTHER SPECIFIED COMPLICATION, WITHOUT LONG-TERM CURRENT USE OF INSULIN (HCC): ICD-10-CM

## 2024-12-16 DIAGNOSIS — E11.65 UNCONTROLLED TYPE 2 DIABETES MELLITUS WITH HYPERGLYCEMIA (HCC): ICD-10-CM

## 2024-12-16 PROCEDURE — 99402 PREV MED CNSL INDIV APPRX 30: CPT

## 2024-12-16 NOTE — PROGRESS NOTES
Patient Consult Note       TIME IN: 1017  am   TIME OUT: 1049 am      Primary care physician: Donny Mcnally M.D.    Reason for consult: Management of Uncontrolled Type 2 Diabetes    HPI:  Israel Aviles is a 56 y.o. old patient who comes in today for evaluation of above stated problem.    Allergies:  Patient has no known allergies.    Most Recent labs, A1c, and immunizations:    Lab Results   Component Value Date/Time    HBA1C 9.6 (H) 12/12/2024 09:43 AM      Latest Reference Range & Units 06/15/23 10:06 09/15/23 09:49 04/04/24 10:27 08/16/24 08:50   Glycohemoglobin 4.0 - 5.6 % 9.3 (H) 8.5 (H) 9.5 (H) 9.2 (H)   (H): Data is abnormally high     Latest Reference Range & Units 02/15/23 06:56   LDL Chol Calc (NIH) 0 - 99 mg/dL 161 (H)   (H): Data is abnormally high         Lab Results   Component Value Date/Time    CHOLSTRLTOT 123 12/12/2024 09:43 AM    LDL 49 12/12/2024 09:43 AM    HDL 54 12/12/2024 09:43 AM    TRIGLYCERIDE 102 12/12/2024 09:43 AM       Lab Results   Component Value Date/Time    SODIUM 141 12/12/2024 09:43 AM    POTASSIUM 4.5 12/12/2024 09:43 AM    CHLORIDE 101 12/12/2024 09:43 AM    CO2 26 12/12/2024 09:43 AM    GLUCOSE 164 (H) 12/12/2024 09:43 AM    BUN 17 12/12/2024 09:43 AM    CREATININE 0.90 12/12/2024 09:43 AM    CREATININE 0.9 05/09/2007 01:20 AM    BUNCREATRAT 16 02/15/2023 06:56 AM     Lab Results   Component Value Date/Time    ALKPHOSPHAT 79 12/12/2024 09:43 AM    ASTSGOT 18 12/12/2024 09:43 AM    ALTSGPT 14 12/12/2024 09:43 AM    TBILIRUBIN 0.4 12/12/2024 09:43 AM    INR 1.05 02/17/2022 09:46 AM    ALBUMIN 4.3 12/12/2024 09:43 AM      Lab Results   Component Value Date/Time    MALBCRT 20 06/15/2023 10:06 AM    MICROALBUR 1.6 06/15/2023 10:06 AM     Medications:    Current Outpatient Medications:     glipiZIDE ER, 5 mg, Oral, BID    cyclobenzaprine, Take one or one half tablet by mouth at bedtime for muscle spasm. May make drowsy.    Evolocumab, 140 mg, Subcutaneous, Q14 DAYS     atorvastatin, 80 mg, Oral, DAILY    metFORMIN, 1,000 mg, Oral, BID WITH MEALS    clopidogrel, 75 mg, Oral, DAILY    metoprolol SR, Take 1 tablet by mouth twice daily, or as directed.    Physical Examination:  Vital signs: Pulse 68   Ht 1.829 m (6')   Wt 76.2 kg (168 lb)   SpO2 97%   BMI 22.78 kg/m²  Body mass index is 22.78 kg/m².    Change in weight: Stable  Exercise habits: minimal exercise   Diet: DASH diet    Assessment and Plan:  1. DM2   A1C above goal  He will likely need a GLP-1 and/or insulin to get A1c to goal   I  continue to have long discussions with him regarding the importance of a lower A1c.  He would like to continue with therapeutic lifestyle, however, I strongly disagree that he will be able to lower his A1c to a significant amount with therapeutic lifestyle changes as he is already on normal weight and eats fairly healthy    Past medications:  Constipation with Trulicity  Stop Farxiga due to fear of side effects  Stopped Janumet, but willing to continue metformin    Discussed the following medications:  Declined trying Rybelsus  Consider long-acting insulin at next appointment  They declined today  Consider Actos  They declined today    Medication(s) recommended:   Continue glipizide 5 mg twice daily  Continue metformin 1000 mg twice daily  Referral to endo       -Blood glucose and A1c target      2.  Cardiovascular  LDL at goal less than 55  Discussed the need for lifetime cholesterol management including long-term use of Lipitor and Repatha  Medication(s) recommended.   Continue with Lipitor 80mg daily   Continue Repatha 140 mg every 14 days  Stopped ACE,   Declined retrial       3.  Lifestyle changes   Focus on eating a DASH/Mediterranean-style diet.   Exercise 30 minutes daily at least 5 days/week, as tolerated.  https://www.niddk.nih.gov/bwp      Follow-up appointment in 12 week(s)    Jevon Barnard, PharmD, MS, BCACP, LCC  [facility]  Phone  [phone] fax [phone]    This note was created using voice recognition software (Dragon). The accuracy of the dictation is limited by the abilities of the software. I have reviewed the note prior to signing, however some errors in grammar and context are still possible. If you have any questions related to this note please do not hesitate to contact our office.     CC:   Donny Mcnally M.D.  No ref. provider found  Dr. Jdue Dallas

## 2024-12-18 NOTE — TELEPHONE ENCOUNTER
Received request via: Pharmacy    Was the patient seen in the last year in this department?No    Does the patient have an active prescription (recently filled or refills available) for medication(s) requested? No    Pharmacy Name:   [facility] [address] [place] [address], [place] - [address]       Does the patient have custodial Plus and need 100-day supply? (This applies to ALL medications) Patient does not have SCP

## 2024-12-20 ENCOUNTER — OFFICE VISIT (OUTPATIENT)
Dept: CARDIOLOGY | Facility: MEDICAL CENTER | Age: 59
End: 2024-12-20
Payer: COMMERCIAL

## 2024-12-20 VITALS
WEIGHT: 169 LBS | SYSTOLIC BLOOD PRESSURE: 124 MMHG | HEART RATE: 90 BPM | BODY MASS INDEX: 22.89 KG/M2 | DIASTOLIC BLOOD PRESSURE: 78 MMHG | RESPIRATION RATE: 16 BRPM | OXYGEN SATURATION: 99 % | HEIGHT: 72 IN

## 2024-12-20 DIAGNOSIS — I10 PRIMARY HYPERTENSION: ICD-10-CM

## 2024-12-20 DIAGNOSIS — I25.83 CORONARY ARTERY DISEASE DUE TO LIPID RICH PLAQUE: ICD-10-CM

## 2024-12-20 DIAGNOSIS — I25.5 ISCHEMIC CARDIOMYOPATHY: ICD-10-CM

## 2024-12-20 DIAGNOSIS — I63.9 RIGHT SIDED CEREBRAL HEMISPHERE CEREBROVASCULAR ACCIDENT (CVA) (HCC): ICD-10-CM

## 2024-12-20 DIAGNOSIS — E78.2 MIXED HYPERLIPIDEMIA: ICD-10-CM

## 2024-12-20 DIAGNOSIS — I50.20 HFREF (HEART FAILURE WITH REDUCED EJECTION FRACTION) (HCC): ICD-10-CM

## 2024-12-20 DIAGNOSIS — I25.10 CORONARY ARTERY DISEASE DUE TO LIPID RICH PLAQUE: ICD-10-CM

## 2024-12-20 PROCEDURE — 99212 OFFICE O/P EST SF 10 MIN: CPT

## 2024-12-20 PROCEDURE — 3074F SYST BP LT 130 MM HG: CPT

## 2024-12-20 PROCEDURE — 3078F DIAST BP <80 MM HG: CPT

## 2024-12-20 PROCEDURE — 99214 OFFICE O/P EST MOD 30 MIN: CPT

## 2024-12-20 RX ORDER — LATANOPROST 50 UG/ML
SOLUTION/ DROPS OPHTHALMIC
COMMUNITY
Start: 2024-09-30

## 2024-12-20 RX ORDER — METOPROLOL SUCCINATE 25 MG/1
25 TABLET, EXTENDED RELEASE ORAL DAILY
COMMUNITY

## 2024-12-20 RX ORDER — CLOPIDOGREL BISULFATE 75 MG/1
75 TABLET ORAL DAILY
Qty: 100 TABLET | Refills: 3 | Status: SHIPPED | OUTPATIENT
Start: 2024-12-20

## 2024-12-20 ASSESSMENT — ENCOUNTER SYMPTOMS
DIZZINESS: 0
ORTHOPNEA: 0
SHORTNESS OF BREATH: 0
LIGHT-HEADEDNESS: 0
DYSPNEA ON EXERTION: 0
SYNCOPE: 0
HEADACHES: 0
PALPITATIONS: 0
NEAR-SYNCOPE: 0
PND: 0

## 2024-12-20 NOTE — PROGRESS NOTES
Chief Complaint   Patient presents with    Hypertension     Follow up          Subjective:   Israel Aviles is a 59 y.o. male who presents today for follow-up.     Patient of Dr. Segovia.  Current medical problems include CVA, DM2, HTN, HLD, PAD s/p angioplasty c/b osteomyelitis s/p LLE BKA 12/20/19; incidentally found new HF 30-35% now recovered and obstructive multivessel CAD. Their last clinic visit was 6/11/2024 with Carla Cano.    Today's visit:  Doing overall well from a cardiac perspective. He has no chest pain, shortness of breath, edema, dizziness/lightheadedness, or palpitations. He has started taking repatha since his last follow up and is tolerating it well. He continues to take all his other medications as prescribed.     Cardiovascular Risk Factors:  1. Smoking status: Never  2. Type II Diabetes Mellitus: Yes   Lab Results   Component Value Date/Time    HBA1C 9.6 (H) 12/12/2024 09:43 AM    HBA1C 9.2 (H) 08/16/2024 08:50 AM     3. Hypertension: Yes  4. Dyslipidemia: Yes   Cholesterol,Tot   Date Value Ref Range Status   12/12/2024 123 100 - 199 mg/dL Final     LDL   Date Value Ref Range Status   12/12/2024 49 <100 mg/dL Final     HDL   Date Value Ref Range Status   12/12/2024 54 >=40 mg/dL Final     Triglycerides   Date Value Ref Range Status   12/12/2024 102 0 - 149 mg/dL Final       Past Medical History:   Diagnosis Date    CAD (coronary artery disease)     Diabetes (HCC)     Hyperlipidemia     Hypertension          Family History   Problem Relation Age of Onset    Diabetes Mother     Diabetes Father          Social History     Tobacco Use    Smoking status: Never    Smokeless tobacco: Never   Vaping Use    Vaping status: Never Used   Substance Use Topics    Alcohol use: No    Drug use: No         No Known Allergies      Current Outpatient Medications   Medication Sig    latanoprost (XALATAN) 0.005 % Solution INSTILL 1 DROP INTO EACH EYE ONCE DAILY AT NIGHT    metoprolol SR (TOPROL XL) 25 MG  TABLET SR 24 HR Take 25 mg by mouth every day.    clopidogrel (PLAVIX) 75 MG Tab Take 1 Tablet by mouth every day.    metFORMIN (GLUCOPHAGE) 500 MG Tab TAKE 2 TABLETS BY MOUTH TWICE DAILY WITH MEALS    glipiZIDE ER (GLUCOTROL XL) 2.5 MG TABLET SR 24 HR Take 2 Tablets by mouth 2 times a day.    Evolocumab (REPATHA) Solution Prefilled Syringe SubQ injection syringe Inject 1 mL under the skin every 14 days.    atorvastatin (LIPITOR) 80 MG tablet Take 1 Tablet by mouth every day. FOR 90 DAYS    cyclobenzaprine (FLEXERIL) 10 mg Tab Take one or one half tablet by mouth at bedtime for muscle spasm. May make drowsy.         Review of Systems   Constitutional: Negative for malaise/fatigue.   Cardiovascular:  Negative for chest pain, dyspnea on exertion, leg swelling, near-syncope, orthopnea, palpitations, paroxysmal nocturnal dyspnea and syncope.   Respiratory:  Negative for shortness of breath.    Neurological:  Negative for dizziness, headaches and light-headedness.           Objective:   /78 (BP Location: Left arm, Patient Position: Sitting)   Pulse 90   Resp 16   Ht 1.829 m (6')   Wt 76.7 kg (169 lb)   SpO2 99%  Body mass index is 22.92 kg/m².         Physical Exam  Vitals reviewed.   Constitutional:       General: He is not in acute distress.     Appearance: Normal appearance.   HENT:      Head: Normocephalic and atraumatic.   Cardiovascular:      Rate and Rhythm: Normal rate and regular rhythm.      Pulses: Normal pulses.      Heart sounds: No murmur heard.  Pulmonary:      Effort: Pulmonary effort is normal. No respiratory distress.      Breath sounds: Normal breath sounds.   Musculoskeletal:      Right lower leg: No edema.   Neurological:      Mental Status: He is alert and oriented to person, place, and time.      Gait: Gait normal.   Psychiatric:         Behavior: Behavior normal.             Lab Results   Component Value Date/Time    SODIUM 141 12/12/2024 09:43 AM    POTASSIUM 4.5 12/12/2024 09:43 AM     CHLORIDE 101 12/12/2024 09:43 AM    CO2 26 12/12/2024 09:43 AM    GLUCOSE 164 (H) 12/12/2024 09:43 AM    BUN 17 12/12/2024 09:43 AM    CREATININE 0.90 12/12/2024 09:43 AM    CREATININE 0.9 05/09/2007 01:20 AM    BUNCREATRAT 16 02/15/2023 06:56 AM      Lab Results   Component Value Date/Time    WBC 5.1 03/02/2022 06:04 AM    RBC 4.20 (L) 03/02/2022 06:04 AM    HEMOGLOBIN 12.9 (L) 03/02/2022 06:04 AM    HEMATOCRIT 38.4 (L) 03/02/2022 06:04 AM    MCV 91.4 03/02/2022 06:04 AM    MCH 30.7 03/02/2022 06:04 AM    MCHC 33.6 (L) 03/02/2022 06:04 AM    MPV 10.5 03/02/2022 06:04 AM    NEUTSPOLYS 46.70 02/25/2022 05:53 AM    LYMPHOCYTES 42.70 (H) 02/25/2022 05:53 AM    MONOCYTES 8.20 02/25/2022 05:53 AM    EOSINOPHILS 1.60 02/25/2022 05:53 AM    BASOPHILS 0.40 02/25/2022 05:53 AM    ANISOCYTOSIS 1+ 12/20/2019 03:00 AM      Lab Results   Component Value Date/Time    CHOLSTRLTOT 123 12/12/2024 09:43 AM    LDL 49 12/12/2024 09:43 AM    HDL 54 12/12/2024 09:43 AM    TRIGLYCERIDE 102 12/12/2024 09:43 AM       Lab Results   Component Value Date/Time    TROPONINT 9 02/17/2022 0946     Lab Results   Component Value Date/Time    NTPROBNP 141 (H) 01/05/2021 0720     Assessment:   1. HFrEF (heart failure with reduced ejection fraction) ([facility])  - clopidogrel (PLAVIX) 75 MG Tab; Take 1 Tablet by mouth every day.  Dispense: 100 Tablet; Refill: 3  - EC-ECHOCARDIOGRAM COMPLETE W/O CONT; Future    2. Right sided cerebral hemisphere cerebrovascular accident (CVA) ([facility])  - clopidogrel (PLAVIX) 75 MG Tab; Take 1 Tablet by mouth every day.  Dispense: 100 Tablet; Refill: 3    3. Ischemic cardiomyopathy    4. Coronary artery disease due to lipid rich plaque    5. Primary hypertension    6. Mixed hyperlipidemia    Other orders  - latanoprost (XALATAN) 0.005 % Solution; INSTILL 1 DROP INTO EACH EYE ONCE DAILY AT NIGHT  - metoprolol SR (TOPROL XL) 25 MG TABLET SR 24 HR; Take 25 mg by mouth every day.        Medical Decision Making:  Today's Assessment /  Plan:    HFrecEF, Stage C, Class II-III, LVEF 60%: euvolemic on exam, no JVD, no edema, no ascites  -Heart failure due to ischemic cardiomyopathy  -Evidence Based Beta-blocker: Continue metoprolol 25 mg daily  -SGLT2i: Patient has stopped jardiance due to concern over side effects. Discussed importance but patient does not want to restart it at this time. Patient continues to be followed by pharmacotherapy for his diabetes  -Repeat echocardiogram in 1 year for surveillance    Ischemic cardiomyopathy  CAD  -Continue plavix 75 mg daily  -Continue atorvastatin 80 mg daily an repatha 140 mg every two weeks  -Continue metoprolol 25 mg daily  -Recommend at least 150min of moderate-intensity aerobic activity per week  -Continue with healthy diet and lifestyle modification    Hypertension  - Good control  - continue metoprolol 25 mg daily  - goal < 130/80    Hyperlipidemia  -Most recent LDL 49  -Continue repatha and atorvastatin  -Goal of less than 55  -Check lipid panel in 3 months  -Patient followed pharmacotherapy    Return in about 1 year (around 12/20/2025).  Sooner if problems.    BANDAR Ortiz

## 2024-12-21 DIAGNOSIS — E11.65 UNCONTROLLED TYPE 2 DIABETES MELLITUS WITH HYPERGLYCEMIA (HCC): ICD-10-CM

## 2024-12-23 NOTE — TELEPHONE ENCOUNTER
Received request via: Pharmacy    Was the patient seen in the last year in this department? Yes    Does the patient have an active prescription (recently filled or refills available) for medication(s) requested? No    Pharmacy Name: [facility] [address] [place] [address], [place] - [address]      Does the patient have intermediate Plus and need 100-day supply? (This applies to ALL medications) Patient does not have SCP

## 2024-12-24 RX ORDER — GLIPIZIDE 2.5 MG/1
5 TABLET, EXTENDED RELEASE ORAL 2 TIMES DAILY
Qty: 120 TABLET | Refills: 11 | Status: SHIPPED | OUTPATIENT
Start: 2024-12-24

## 2025-01-10 ENCOUNTER — APPOINTMENT (OUTPATIENT)
Dept: MEDICAL GROUP | Facility: OTHER | Age: 60
End: 2025-01-10
Payer: COMMERCIAL

## 2025-01-10 VITALS
TEMPERATURE: 98.2 F | DIASTOLIC BLOOD PRESSURE: 80 MMHG | HEIGHT: 72 IN | HEART RATE: 82 BPM | OXYGEN SATURATION: 97 % | BODY MASS INDEX: 23.3 KG/M2 | SYSTOLIC BLOOD PRESSURE: 110 MMHG | WEIGHT: 172 LBS

## 2025-01-10 DIAGNOSIS — I25.83 CORONARY ARTERY DISEASE DUE TO LIPID RICH PLAQUE: ICD-10-CM

## 2025-01-10 DIAGNOSIS — E78.2 MIXED HYPERLIPIDEMIA: ICD-10-CM

## 2025-01-10 DIAGNOSIS — E11.65 UNCONTROLLED TYPE 2 DIABETES MELLITUS WITH HYPERGLYCEMIA (HCC): ICD-10-CM

## 2025-01-10 DIAGNOSIS — I73.9 PVD (PERIPHERAL VASCULAR DISEASE) (HCC): ICD-10-CM

## 2025-01-10 DIAGNOSIS — E11.42 DIABETIC POLYNEUROPATHY ASSOCIATED WITH TYPE 2 DIABETES MELLITUS (HCC): ICD-10-CM

## 2025-01-10 DIAGNOSIS — S88.112S BELOW-KNEE AMPUTATION OF LEFT LOWER EXTREMITY, SEQUELA (HCC): ICD-10-CM

## 2025-01-10 DIAGNOSIS — I25.10 CORONARY ARTERY DISEASE DUE TO LIPID RICH PLAQUE: ICD-10-CM

## 2025-01-10 PROCEDURE — 3079F DIAST BP 80-89 MM HG: CPT | Performed by: FAMILY MEDICINE

## 2025-01-10 PROCEDURE — RXMED WILLOW AMBULATORY MEDICATION CHARGE

## 2025-01-10 PROCEDURE — 99214 OFFICE O/P EST MOD 30 MIN: CPT | Performed by: FAMILY MEDICINE

## 2025-01-10 PROCEDURE — 3074F SYST BP LT 130 MM HG: CPT | Performed by: FAMILY MEDICINE

## 2025-01-10 ASSESSMENT — PATIENT HEALTH QUESTIONNAIRE - PHQ9: CLINICAL INTERPRETATION OF PHQ2 SCORE: 0

## 2025-01-10 NOTE — ASSESSMENT & PLAN NOTE
Lipid are now well controlled, stable.   Lipid panel of 12/12/2024 reviewed: Total chol 123, Trigs 102, LDL 49, HDL 54..   Continue high dose Atorvastatin daily.   Continue Repatha injections through [facility].     Repeat labs in 6 months.

## 2025-01-10 NOTE — ASSESSMENT & PLAN NOTE
Type 2 diabetes, not weal controlled with known CAD and prior left BKA.   Endocrinology referral placed, as he is a difficult case with multiple comorbidities.  Needs more medications added, though cost has been an issue in the past.

## 2025-01-10 NOTE — PROGRESS NOTES
[facility] MEDICINE     PATIENT ID:  NAME:  Israel Aviles  MRN:               [id number]  YOB: 1965    Date: 2:22 PM      CC:  diabetes, heart      HPI: Israel Aviles is a 59 y.o. male who presented with multiple concerns.    Problem   Mixed Hyperlipidemia    Labs from 12/12/2024 reviewed at time of visit.  Has CAD in picture of mixed hyperlipidemia.  Still taking Atorvastatin daily, and now also getting Repatha injections from [facility].      Below-Knee Amputation (Hcc)    Left lower extremity prosthesis is in good shape.  He uses [employer].  Has no prosthetic needs currently.      Cad (Coronary Artery Disease)    Has known cardiac disease, and a past history of MI and was a candidate for CABG in past.  Has risk factors of Diabetes and Dyslipidemia, PAD, prior Stroke.  Denies any chest pain, dyspnea.  He is on maximal medical therapy including Atorvastatin and Repatha.  Has Cardiology follow-up in one year.  He remains on Clopidogrel daily; not on aspirin.  He stopped taking the Farxiga due to cost.      Diabetic Polyneuropathy Associated With Type 2 Diabetes Mellitus ([facility])    Monofilament exam performed 4/12/2024 on right foot and was normal; .  Has had prior left lower extremity amputation, an AKA.  Still with paresthesias of right lower extremity.  Denies any prosthesis problems with left lower extremity.  Wears his diabetic shoe daily on RLE.      Pvd (Peripheral Vascular Disease) ([facility])    Prior amputation left lower limb.  On aggressive medical therapy.  Diabetic.      Uncontrolled Type 2 Diabetes Mellitus With Hyperglycemia ([facility])    Patient denies headaches, vision changes, weakness, syncope or near syncope, abdominal pain, diarrhea, polyuria, polyphagia, or weight changes.  Has had trouble getting some meds approved and covered by their insurance.  Has seen the Diabetes Pharmacy folks at [facility] in the past.  Saw Dr Gallagher in the past and now would like to see Dr Knott  Sabranicol for Endocrinology care.  His recent A1c increased to 9.6% from 9.2%.  He is testing his sugars daily, and has no symptomatic low sugars.          REVIEW OF SYSTEMS:   Ten systems reviewed and were negative except as noted in the HPI.                PROBLEM LIST  Patient Active Problem List   Diagnosis    Uncontrolled type 2 diabetes mellitus with hyperglycemia (HCC)    PVD (peripheral vascular disease) (HCC)    Diabetic polyneuropathy associated with type 2 diabetes mellitus (HCC)    Pressure injury of deep tissue of left foot    Osteomyelitis of left foot (HCC)    Vitamin D deficiency    CAD (coronary artery disease)    HTN (hypertension)    Orthostatic hypotension    Below-knee amputation (HCC)    Adjustment disorder with depressed mood    Ischemic cardiomyopathy    Body mass index (BMI) 23.0-23.9, adult    TIA (transient ischemic attack)    Colon cancer screening declined    Anemia    Right sided cerebral hemisphere cerebrovascular accident (CVA) ([facility])    Late effect of stroke    Anxiety    Mixed hyperlipidemia    Encounter for preventive care    Spasm of thoracic back muscle        PAST SURGICAL HISTORY:  Past Surgical History:   Procedure Laterality Date    KNEE AMPUTATION BELOW Left 12/20/2019    Procedure: AMPUTATION, BELOW KNEE;  Surgeon: Koby Zhao M.D.;  Location: SURGERY [facility];  Service: Orthopedics    Z CARDIAC CATH  12/16/2019    multi-vessel disease    IRRIGATION & DEBRIDEMENT ORTHO Left 6/24/2019    Procedure: IRRIGATION AND DEBRIDEMENT, WOUND-FOOT, BIOLOGIC PLACEMENT, WOUND VAC PLACEMENT;  Surgeon: Charles Chopra M.D.;  Location: SURGERY [facility];  Service: Orthopedics       FAMILY HISTORY:  Family History   Problem Relation Age of Onset    Diabetes Mother     Diabetes Father        SOCIAL HISTORY:   Social History     Tobacco Use    Smoking status: Never    Smokeless tobacco: Never   Substance Use Topics    Alcohol use: No       ALLERGIES:  No Known  Allergies    OUTPATIENT MEDICATIONS:    Current Outpatient Medications:     glipiZIDE ER (GLUCOTROL XL) 2.5 MG TABLET SR 24 HR, Take 2 Tablets by mouth 2 times a day., Disp: 120 Tablet, Rfl: 11    latanoprost (XALATAN) 0.005 % Solution, INSTILL 1 DROP INTO EACH EYE ONCE DAILY AT NIGHT, Disp: , Rfl:     metoprolol SR (TOPROL XL) 25 MG TABLET SR 24 HR, Take 25 mg by mouth every day., Disp: , Rfl:     clopidogrel (PLAVIX) 75 MG Tab, Take 1 Tablet by mouth every day., Disp: 100 Tablet, Rfl: 3    metFORMIN (GLUCOPHAGE) 500 MG Tab, TAKE 2 TABLETS BY MOUTH TWICE DAILY WITH MEALS, Disp: 120 Tablet, Rfl: 0    cyclobenzaprine (FLEXERIL) 10 mg Tab, Take one or one half tablet by mouth at bedtime for muscle spasm. May make drowsy., Disp: 30 Tablet, Rfl: 0    Evolocumab (REPATHA) Solution Prefilled Syringe SubQ injection syringe, Inject 1 mL under the skin every 14 days., Disp: 6 mL, Rfl: 4    atorvastatin (LIPITOR) 80 MG tablet, Take 1 Tablet by mouth every day. FOR 90 DAYS, Disp: 90 Tablet, Rfl: 4    PHYSICAL EXAM:  Vitals:    01/10/25 1006   BP: 110/80   BP Location: Left arm   Patient Position: Sitting   Pulse: 82   Temp: 36.8 °C (98.2 °F)   SpO2: 97%   Weight: 78 kg (172 lb)   Height: 1.829 m (6')       General: Pt resting in NAD, cooperative   Skin:  Pink, warm and dry.  HEENT: NC/AT. EOMI.  Lungs:  Symmetrical.  CTAB, good air movement   Cardiovascular:  normal S1/S2, RRR without murmur or gallop.   Abdomen:  Abdomen is soft, nontender  Extremities:  Full range of motion. Left BKA; wears prosthesis.   CNS:  Muscle tone is normal. No gross focal neurologic deficits      ASSESSMENT/PLAN:   59 y.o. male     Problem List Items Addressed This Visit       Below-knee amputation (HCC)     May replace DME supplies as needed without office visit.         CAD (coronary artery disease)     Lipid are now well controlled, stable.   Lipid panel of 12/12/2024 reviewed: Total chol 123, Trigs 102, LDL 49, HDL 54..   Continue high dose  Atorvastatin daily.   Continue Repatha injections through [facility].     Repeat labs in 6 months.                Relevant Orders    Referral to Endocrinology    Diabetic polyneuropathy associated with type 2 diabetes mellitus (HCC)     Stable diabetic neuropathy, with prior left BKA.  Likely needs specialty care through Endocrinology.   May order prosthetic supplies as needed.          Mixed hyperlipidemia     Stable and improved lipid problem.   Continue high dose Atorvastatin daily.   May refill this maintenance medication for one year as needed.    Continue Repatha injections.          PVD (peripheral vascular disease) ([facility])     Aspirin, Plavix, statin and Ezetimibe.   Plant based diet reviewed.         Relevant Orders    Referral to Endocrinology    Uncontrolled type 2 diabetes mellitus with hyperglycemia (HCC)     Type 2 diabetes, not weal controlled with known CAD and prior left BKA.   Endocrinology referral placed, as he is a difficult case with multiple comorbidities.  Needs more medications added, though cost has been an issue in the past.          Relevant Orders    Referral to Endocrinology       Donny Mcnally MD  [facility]

## 2025-01-10 NOTE — ASSESSMENT & PLAN NOTE
Stable diabetic neuropathy, with prior left BKA.  Likely needs specialty care through Endocrinology.   May order prosthetic supplies as needed.

## 2025-01-10 NOTE — ASSESSMENT & PLAN NOTE
Stable and improved lipid problem.   Continue high dose Atorvastatin daily.   May refill this maintenance medication for one year as needed.    Continue Repatha injections.

## 2025-01-14 ENCOUNTER — PHARMACY VISIT (OUTPATIENT)
Dept: PHARMACY | Facility: MEDICAL CENTER | Age: 60
End: 2025-01-14
Payer: COMMERCIAL

## 2025-01-15 DIAGNOSIS — E11.69 TYPE 2 DIABETES MELLITUS WITH OTHER SPECIFIED COMPLICATION, WITHOUT LONG-TERM CURRENT USE OF INSULIN (HCC): ICD-10-CM

## 2025-01-15 DIAGNOSIS — I63.9 RIGHT SIDED CEREBRAL HEMISPHERE CEREBROVASCULAR ACCIDENT (CVA) (HCC): ICD-10-CM

## 2025-01-15 NOTE — TELEPHONE ENCOUNTER
Received request via: Pharmacy    Was the patient seen in the last year in this department? No    Does the patient have an active prescription (recently filled or refills available) for medication(s) requested? No    Pharmacy Name:   [facility] [address] [place] [address], [place] - [address]       Does the patient have prison Plus and need 100-day supply? (This applies to ALL medications) Patient does not have SCP

## 2025-02-07 PROCEDURE — RXMED WILLOW AMBULATORY MEDICATION CHARGE

## 2025-02-13 DIAGNOSIS — E11.69 TYPE 2 DIABETES MELLITUS WITH OTHER SPECIFIED COMPLICATION, WITHOUT LONG-TERM CURRENT USE OF INSULIN (HCC): ICD-10-CM

## 2025-02-13 DIAGNOSIS — I63.9 RIGHT SIDED CEREBRAL HEMISPHERE CEREBROVASCULAR ACCIDENT (CVA) (HCC): ICD-10-CM

## 2025-02-18 ENCOUNTER — PHARMACY VISIT (OUTPATIENT)
Dept: PHARMACY | Facility: MEDICAL CENTER | Age: 60
End: 2025-02-18
Payer: COMMERCIAL

## 2025-03-10 ENCOUNTER — TELEPHONE (OUTPATIENT)
Dept: VASCULAR LAB | Facility: MEDICAL CENTER | Age: 60
End: 2025-03-10
Payer: COMMERCIAL

## 2025-03-10 NOTE — TELEPHONE ENCOUNTER
Called and s/w pt's Son Shuv regarding his call earlier today. Per discussion pt's son, he would like to have pt scheduled for f/u appointment ASAP to consider initiation of long acting insulin. Discussed possibility of having patient seen at [facility] on 3/25 or 3/26 vs re-scheduling current f/u appointment  at [facility] location for sooner f/u of 4/7/25 instead of 4/28/25. Pt's son would prefer to see if PCP could initiate long acting insulin sooner than any available pharmacotherapy appointments at this time. States that he will c/b to schedule for sooner f/u if unable to get in to see PCP sooner than current availabilities listed above.     Jeff Swain PharmD  Authorized Nuclear Pharmacist (ANP)

## 2025-03-10 NOTE — TELEPHONE ENCOUNTER
Caller: Cori - son     Topic/issue: Son calling because patient just had a procedure done and blood sugars have been elevated since (around 230). Patient's podiatrist mentioned potentially needing to go on insulin to help lower them. Please advise.     Callback Number: [phone]    Thank you,  Sarah CHAVEZ

## 2025-03-11 ENCOUNTER — TELEPHONE (OUTPATIENT)
Dept: MEDICAL GROUP | Facility: OTHER | Age: 60
End: 2025-03-11
Payer: COMMERCIAL

## 2025-03-11 DIAGNOSIS — I25.83 CORONARY ARTERY DISEASE DUE TO LIPID RICH PLAQUE: ICD-10-CM

## 2025-03-11 DIAGNOSIS — E11.65 UNCONTROLLED TYPE 2 DIABETES MELLITUS WITH HYPERGLYCEMIA (HCC): ICD-10-CM

## 2025-03-11 DIAGNOSIS — I25.10 CORONARY ARTERY DISEASE DUE TO LIPID RICH PLAQUE: ICD-10-CM

## 2025-03-11 DIAGNOSIS — E11.42 DIABETIC POLYNEUROPATHY ASSOCIATED WITH TYPE 2 DIABETES MELLITUS (HCC): ICD-10-CM

## 2025-03-11 DIAGNOSIS — I25.5 ISCHEMIC CARDIOMYOPATHY: ICD-10-CM

## 2025-03-11 DIAGNOSIS — I73.9 PVD (PERIPHERAL VASCULAR DISEASE) (HCC): ICD-10-CM

## 2025-03-11 PROCEDURE — RXMED WILLOW AMBULATORY MEDICATION CHARGE

## 2025-03-11 RX ORDER — DAPAGLIFLOZIN 10 MG/1
10 TABLET, FILM COATED ORAL DAILY
Qty: 100 TABLET | Refills: 3 | Status: SHIPPED | OUTPATIENT
Start: 2025-03-11 | End: 2025-03-11

## 2025-03-11 RX ORDER — DAPAGLIFLOZIN 10 MG/1
10 TABLET, FILM COATED ORAL DAILY
Qty: 100 TABLET | Refills: 3 | Status: SHIPPED | OUTPATIENT
Start: 2025-03-11 | End: 2026-04-15

## 2025-03-11 NOTE — TELEPHONE ENCOUNTER
I would like to put him back on Farxiga if he can afford it. He may live longer taking that medication with his history of heart disease.

## 2025-03-11 NOTE — TELEPHONE ENCOUNTER
This patient's son called in today stating that since his father had his angioplasty on 03/05/2025 he has been experiencing high blood sugar numbers in the 230 range while fasting.They are just wondering if there is anything they can do to get his blood sugar down. Please call them at [phone] or [phone].  Please advise.

## 2025-03-12 ENCOUNTER — APPOINTMENT (OUTPATIENT)
Dept: CARDIOLOGY | Facility: MEDICAL CENTER | Age: 60
End: 2025-03-12
Attending: INTERNAL MEDICINE
Payer: COMMERCIAL

## 2025-03-17 ENCOUNTER — PHARMACY VISIT (OUTPATIENT)
Dept: PHARMACY | Facility: MEDICAL CENTER | Age: 60
End: 2025-03-17
Payer: COMMERCIAL

## 2025-03-17 DIAGNOSIS — E11.69 TYPE 2 DIABETES MELLITUS WITH OTHER SPECIFIED COMPLICATION, WITHOUT LONG-TERM CURRENT USE OF INSULIN (HCC): ICD-10-CM

## 2025-03-17 DIAGNOSIS — I63.9 RIGHT SIDED CEREBRAL HEMISPHERE CEREBROVASCULAR ACCIDENT (CVA) (HCC): ICD-10-CM

## 2025-04-11 PROCEDURE — RXMED WILLOW AMBULATORY MEDICATION CHARGE

## 2025-04-14 ENCOUNTER — OFFICE VISIT (OUTPATIENT)
Dept: MEDICAL GROUP | Facility: OTHER | Age: 60
End: 2025-04-14
Payer: COMMERCIAL

## 2025-04-14 VITALS
RESPIRATION RATE: 16 BRPM | DIASTOLIC BLOOD PRESSURE: 62 MMHG | OXYGEN SATURATION: 97 % | HEIGHT: 72 IN | WEIGHT: 172 LBS | HEART RATE: 101 BPM | SYSTOLIC BLOOD PRESSURE: 116 MMHG | TEMPERATURE: 98.1 F | BODY MASS INDEX: 23.3 KG/M2

## 2025-04-14 DIAGNOSIS — I25.83 CORONARY ARTERY DISEASE DUE TO LIPID RICH PLAQUE: ICD-10-CM

## 2025-04-14 DIAGNOSIS — I25.10 CORONARY ARTERY DISEASE DUE TO LIPID RICH PLAQUE: ICD-10-CM

## 2025-04-14 DIAGNOSIS — E11.65 UNCONTROLLED TYPE 2 DIABETES MELLITUS WITH HYPERGLYCEMIA (HCC): ICD-10-CM

## 2025-04-14 DIAGNOSIS — S91.301A WOUND OF RIGHT FOOT: ICD-10-CM

## 2025-04-14 DIAGNOSIS — E11.42 DIABETIC POLYNEUROPATHY ASSOCIATED WITH TYPE 2 DIABETES MELLITUS (HCC): ICD-10-CM

## 2025-04-14 DIAGNOSIS — E78.2 MIXED HYPERLIPIDEMIA: ICD-10-CM

## 2025-04-14 DIAGNOSIS — I73.9 LOWER EXTREMITY ARTERIAL INSUFFICIENCY, SEVERE, RIGHT (HCC): ICD-10-CM

## 2025-04-14 PROCEDURE — 3074F SYST BP LT 130 MM HG: CPT | Performed by: FAMILY MEDICINE

## 2025-04-14 PROCEDURE — 3078F DIAST BP <80 MM HG: CPT | Performed by: FAMILY MEDICINE

## 2025-04-14 PROCEDURE — 99214 OFFICE O/P EST MOD 30 MIN: CPT | Performed by: FAMILY MEDICINE

## 2025-04-14 RX ORDER — ATORVASTATIN CALCIUM 80 MG/1
80 TABLET, FILM COATED ORAL DAILY
Qty: 90 TABLET | Refills: 4 | Status: SHIPPED | OUTPATIENT
Start: 2025-04-14

## 2025-04-14 RX ORDER — RIVAROXABAN 20 MG/1
TABLET, FILM COATED ORAL
COMMUNITY
Start: 2025-03-31

## 2025-04-14 ASSESSMENT — FIBROSIS 4 INDEX: FIB4 SCORE: 1.04

## 2025-04-14 NOTE — ASSESSMENT & PLAN NOTE
Type 2 diabetes, not weal controlled with known CAD and prior left BKA.   Endocrinology referral previously placed, as he is a difficult case with multiple comorbidities.  Farxiga restarted.  Discussed he may liver longer from taking it.

## 2025-04-14 NOTE — PATIENT INSTRUCTIONS
Continue to follow-up with Vascular Surgery, Podiatry.    Follow-up regularly with Wound Care @ [facility].     Labs per [facility].     Recheck 3 months.

## 2025-04-14 NOTE — ASSESSMENT & PLAN NOTE
Stable and improved lipid problem.   Continue high dose Atorvastatin daily.   May refill this maintenance medication for one year as needed.    Continue Repatha injections through [facility].

## 2025-04-14 NOTE — ASSESSMENT & PLAN NOTE
New problem of right foot; has severe PAD, Atherosclerosis, and Diabetes.   Continue with current therapies.  Maximize medical therapies.   Consider reviewing case with his specialists.

## 2025-04-14 NOTE — ASSESSMENT & PLAN NOTE
New problem, now stable following thrombolysis.   Hospital records are reviewed.   Continue Xarelto therapy for 6-12 weeks  per Vascular service. . .   Has Vascular Surgery and Podiatry service follow-up with Cheryl Graham and Nohemi respectively. .   Receiving Wound Care through [facility]

## 2025-04-14 NOTE — ASSESSMENT & PLAN NOTE
Stable diabetic neuropathy, with prior left BKA.  Restarted his Farxiga last month.  Also on Glipizide.  Still may need specialty care through Endocrinology.   May order prosthetic supplies as needed.

## 2025-04-14 NOTE — PROGRESS NOTES
[facility]     PATIENT ID:  NAME:  Israel Aviles  MRN:               [id number]  YOB: 1965    Date: 10:18 AM      CC:  Diabetes, recent hospitalization      HPI: Israel Aviles is a 59 y.o. male who presented with multiple concerns and a recent hospitalization in early March for acute limb ischemia.     Problem   Lower Extremity Arterial Insufficiency, Severe, Right (Hcc)    Found to have acute limb ischemia of RLE in early March and was seen by Vascular Surgery, Dr Ave Graham.  Was admitted to [facility] and had IR guided arterial thrombolysis.  Subsequently was started on Xarelto which he continues to take, most likely for next 12 weeks.  Had a blister on right foot unroofed by Dr Song, also, and has been under Wound Care at [facility].  Has been in wa;maria boot, weight bearing.      Mixed Hyperlipidemia    Labs from 3/5/2025 reviewed at time of visit.  Has CAD in picture of mixed hyperlipidemia.  Still taking Atorvastatin 80 mg daily, and now also getting Repatha injections from [facility].  Need statin refill today.      Wound of Right Foot    New wound of right foot.  Under care with Podiatry, Vascular Surgery and [facility] Wound Care for treatment.      Diabetic Polyneuropathy Associated With Type 2 Diabetes Mellitus ([facility])    Monofilament exam last performed 4/12/2024 on right foot and was normal.  His right foot is bandaged to day and he is headed over to Wound Care at [facility] this morning.  Had an arterial procedure in March for thrombolysis at [facility].  Has had prior left lower extremity above knee amputation.  Still has paresthesias of right lower extremity.  Denies any prosthesis problems with left lower extremity.  Wears his diabetic shoe daily on RLE.  Recently getting care with Dr Graham of Vascular Surgery, Dr Song of Podiatry.      Uncontrolled Type 2 Diabetes Mellitus With Hyperglycemia ([facility])    Patient denies headaches, vision changes, weakness,  lightheadedness, syncope or near syncope, abdominal pain, diarrhea, polyuria, polyphagia, or weight changes.  Has had trouble getting some meds approved and covered by their insurance.  Has seen the Diabetes Pharmacy folks at [facility] recently.  Referral previously placed to Dr Nikos Riggins for Endocrinology care.  His recent A1c decreased to 8% from 9.6%.  He is testing his sugars daily, and has no symptomatic low sugars.          REVIEW OF SYSTEMS:   Ten systems reviewed and were negative except as noted in the HPI.                PROBLEM LIST  Patient Active Problem List   Diagnosis    Uncontrolled type 2 diabetes mellitus with hyperglycemia (HCC)    PVD (peripheral vascular disease) (HCC)    Diabetic polyneuropathy associated with type 2 diabetes mellitus (HCC)    Pressure injury of deep tissue of left foot    Osteomyelitis of left foot (HCC)    Vitamin D deficiency    CAD (coronary artery disease)    HTN (hypertension)    Orthostatic hypotension    Wound of right foot    Below-knee amputation ([facility])    Adjustment disorder with depressed mood    Ischemic cardiomyopathy    Body mass index (BMI) 23.0-23.9, adult    TIA (transient ischemic attack)    Colon cancer screening declined    Anemia    Right sided cerebral hemisphere cerebrovascular accident (CVA) ([facility])    Late effect of stroke    Anxiety    Mixed hyperlipidemia    Encounter for preventive care    Spasm of thoracic back muscle    Lower extremity arterial insufficiency, severe, right (HCC)        PAST SURGICAL HISTORY:  Past Surgical History:   Procedure Laterality Date    KNEE AMPUTATION BELOW Left 12/20/2019    Procedure: AMPUTATION, BELOW KNEE;  Surgeon: Koby Zhao M.D.;  Location: SURGERY [facility];  Service: Orthopedics    [facility] CARDIAC CATH  12/16/2019    multi-vessel disease    IRRIGATION & DEBRIDEMENT ORTHO Left 6/24/2019    Procedure: IRRIGATION AND DEBRIDEMENT, WOUND-FOOT, BIOLOGIC PLACEMENT, WOUND VAC PLACEMENT;  Surgeon: Charles  SREEDHAR Chopra;  Location: SURGERY [facility];  Service: Orthopedics       FAMILY HISTORY:  Family History   Problem Relation Age of Onset    Diabetes Mother     Diabetes Father        SOCIAL HISTORY:   Social History     Tobacco Use    Smoking status: Never    Smokeless tobacco: Never   Substance Use Topics    Alcohol use: No       ALLERGIES:  No Known Allergies    OUTPATIENT MEDICATIONS:    Current Outpatient Medications:     XARELTO 20 MG Tab tablet, TAKE 1 TABLET BY MOUTH ONCE DAILY WITH SUPPER FOR 30 DAYS (TO START AFTER 15 MG TWO TIMES A DAY FOR 21 DAYS), Disp: , Rfl:     atorvastatin (LIPITOR) 80 MG tablet, Take 1 Tablet by mouth every day. FOR 90 DAYS, Disp: 90 Tablet, Rfl: 4    metFORMIN (GLUCOPHAGE) 500 MG Tab, TAKE 2 TABLETS BY MOUTH TWICE DAILY WITH MEALS, Disp: 120 Tablet, Rfl: 0    FARXIGA 10 MG Tab, Take 1 Tablet by mouth every day., Disp: 100 Tablet, Rfl: 3    glipiZIDE ER (GLUCOTROL XL) 2.5 MG TABLET SR 24 HR, Take 2 Tablets by mouth 2 times a day., Disp: 120 Tablet, Rfl: 11    latanoprost (XALATAN) 0.005 % Solution, INSTILL 1 DROP INTO EACH EYE ONCE DAILY AT NIGHT, Disp: , Rfl:     metoprolol SR (TOPROL XL) 25 MG TABLET SR 24 HR, Take 25 mg by mouth every day., Disp: , Rfl:     clopidogrel (PLAVIX) 75 MG Tab, Take 1 Tablet by mouth every day., Disp: 100 Tablet, Rfl: 3    cyclobenzaprine (FLEXERIL) 10 mg Tab, Take one or one half tablet by mouth at bedtime for muscle spasm. May make drowsy., Disp: 30 Tablet, Rfl: 0    Evolocumab (REPATHA) Solution Prefilled Syringe SubQ injection syringe, Inject 1 mL under the skin every 14 days., Disp: 6 mL, Rfl: 4    PHYSICAL EXAM:  Vitals:    04/14/25 0759   BP: 116/62   BP Location: Right arm   Patient Position: Sitting   BP Cuff Size: Adult   Pulse: (!) 101   Resp: 16   Temp: 36.7 °C (98.1 °F)   TempSrc: Temporal   SpO2: 97%   Weight: 78 kg (172 lb)   Height: 1.829 m (6')       General: Pt resting in NAD, cooperative   Skin:  Pink, warm and dry.  HEENT:  NC/AT. EOMI.  Lungs:  Symmetrical.  CTAB, good air movement   Cardiovascular:  S1/S2 RRR   Abdomen:  Abdomen is soft, nontender  Extremities:  Full range of motion.  CNS:  Muscle tone is normal. No gross focal neurologic deficits      ASSESSMENT/PLAN:   59 y.o. male     Problem List Items Addressed This Visit       CAD (coronary artery disease)    Relevant Medications    XARELTO 20 MG Tab tablet    atorvastatin (LIPITOR) 80 MG tablet    Diabetic polyneuropathy associated with type 2 diabetes mellitus (HCC)    Stable diabetic neuropathy, with prior left BKA.  Restarted his Farxiga last month.  Also on Glipizide.  Still may need specialty care through Endocrinology.   May order prosthetic supplies as needed.          Relevant Orders    HEMOGLOBIN A1C    BASIC METABOLIC PANEL    Lower extremity arterial insufficiency, severe, right (HCC)    New problem, now stable following thrombolysis.   Hospital records are reviewed.   Continue Xarelto therapy for 6-12 weeks  per Vascular service. . .   Has Vascular Surgery and Podiatry service follow-up with Cheryl Graham and Nohemi respectively. .   Receiving Wound Care through [facility].          Relevant Medications    XARELTO 20 MG Tab tablet    atorvastatin (LIPITOR) 80 MG tablet    Mixed hyperlipidemia    Stable and improved lipid problem.   Continue high dose Atorvastatin daily.   May refill this maintenance medication for one year as needed.    Continue Repatha injections through [facility].          Relevant Medications    XARELTO 20 MG Tab tablet    atorvastatin (LIPITOR) 80 MG tablet    Uncontrolled type 2 diabetes mellitus with hyperglycemia (HCC)    Type 2 diabetes, not weal controlled with known CAD and prior left BKA.   Endocrinology referral previously placed, as he is a difficult case with multiple comorbidities.  Farxiga restarted.  Discussed he may liver longer from taking it.          Relevant Medications    atorvastatin (LIPITOR) 80 MG tablet    Other  Relevant Orders    HEMOGLOBIN A1C    BASIC METABOLIC PANEL    Wound of right foot    New problem of right foot; has severe PAD, Atherosclerosis, and Diabetes.   Continue with current therapies.  Maximize medical therapies.   Consider reviewing case with his specialists.           Will discuss wound acre with Dr Graham of Vascular Surgery this week.     Hospital records from [facility] are reviewed today at time of visit.     Donny Mcnally MD  [facility]

## 2025-04-18 ENCOUNTER — PHARMACY VISIT (OUTPATIENT)
Dept: PHARMACY | Facility: MEDICAL CENTER | Age: 60
End: 2025-04-18
Payer: COMMERCIAL

## 2025-04-18 DIAGNOSIS — I63.9 RIGHT SIDED CEREBRAL HEMISPHERE CEREBROVASCULAR ACCIDENT (CVA) (HCC): ICD-10-CM

## 2025-04-18 DIAGNOSIS — E11.69 TYPE 2 DIABETES MELLITUS WITH OTHER SPECIFIED COMPLICATION, WITHOUT LONG-TERM CURRENT USE OF INSULIN (HCC): ICD-10-CM

## 2025-04-19 NOTE — TELEPHONE ENCOUNTER
Received request via: Pharmacy    Was the patient seen in the last year in this department? Yes    Does the patient have an active prescription (recently filled or refills available) for medication(s) requested? No    Pharmacy Name: [facility] [address] [place], [place] [address], [place] - [address]

## 2025-04-21 DIAGNOSIS — I73.9 PVD (PERIPHERAL VASCULAR DISEASE) (HCC): ICD-10-CM

## 2025-04-21 DIAGNOSIS — E11.65 UNCONTROLLED TYPE 2 DIABETES MELLITUS WITH HYPERGLYCEMIA (HCC): ICD-10-CM

## 2025-04-21 DIAGNOSIS — S91.301A WOUND OF RIGHT FOOT: ICD-10-CM

## 2025-04-21 DIAGNOSIS — S88.112S BELOW-KNEE AMPUTATION OF LEFT LOWER EXTREMITY, SEQUELA (HCC): ICD-10-CM

## 2025-04-21 DIAGNOSIS — I73.9 LOWER EXTREMITY ARTERIAL INSUFFICIENCY, SEVERE, RIGHT (HCC): ICD-10-CM

## 2025-04-21 NOTE — PROGRESS NOTES
Case reviewed with Dr Ave Graham, Vascular Surgery, by phone on 4/18/2025.  She likes [facility]'s Wound Care and also likes [facility] for Wound Care.  She is very concerned that he may lose his great toe of right foot due to its poor blood supply.  Referral placed to [facility], Wound Care clinic..

## 2025-05-08 PROCEDURE — RXMED WILLOW AMBULATORY MEDICATION CHARGE

## 2025-05-12 ENCOUNTER — PHARMACY VISIT (OUTPATIENT)
Dept: PHARMACY | Facility: MEDICAL CENTER | Age: 60
End: 2025-05-12
Payer: COMMERCIAL

## 2025-05-15 DIAGNOSIS — E11.42 DIABETIC POLYNEUROPATHY ASSOCIATED WITH TYPE 2 DIABETES MELLITUS (HCC): ICD-10-CM

## 2025-05-28 DIAGNOSIS — S91.301A WOUND OF RIGHT FOOT: Primary | ICD-10-CM

## 2025-05-28 DIAGNOSIS — S88.112S BELOW-KNEE AMPUTATION OF LEFT LOWER EXTREMITY, SEQUELA (HCC): ICD-10-CM

## 2025-06-06 PROCEDURE — RXMED WILLOW AMBULATORY MEDICATION CHARGE

## 2025-06-09 ENCOUNTER — TELEPHONE (OUTPATIENT)
Dept: SPORTS MEDICINE | Facility: OTHER | Age: 60
End: 2025-06-09
Payer: COMMERCIAL

## 2025-06-09 DIAGNOSIS — I25.10 CORONARY ARTERY DISEASE DUE TO LIPID RICH PLAQUE: ICD-10-CM

## 2025-06-09 DIAGNOSIS — E11.65 UNCONTROLLED TYPE 2 DIABETES MELLITUS WITH HYPERGLYCEMIA (HCC): Primary | ICD-10-CM

## 2025-06-09 DIAGNOSIS — I25.83 CORONARY ARTERY DISEASE DUE TO LIPID RICH PLAQUE: ICD-10-CM

## 2025-06-09 DIAGNOSIS — I10 PRIMARY HYPERTENSION: ICD-10-CM

## 2025-06-09 NOTE — TELEPHONE ENCOUNTER
Manan's wife came in with a referral request for his eye Doctor's appointment.with Dr. Parikh. She would like to see if she can get it As soon as possible because of the appointment date that's already been set.

## 2025-06-10 ENCOUNTER — PHARMACY VISIT (OUTPATIENT)
Dept: PHARMACY | Facility: MEDICAL CENTER | Age: 60
End: 2025-06-10
Payer: COMMERCIAL

## 2025-06-16 ENCOUNTER — APPOINTMENT (OUTPATIENT)
Dept: MEDICAL GROUP | Facility: PHYSICIAN GROUP | Age: 60
End: 2025-06-16
Payer: COMMERCIAL

## 2025-06-16 ENCOUNTER — PRE-ADMISSION TESTING (OUTPATIENT)
Dept: ADMISSIONS | Facility: MEDICAL CENTER | Age: 60
End: 2025-06-16
Attending: PODIATRIST
Payer: COMMERCIAL

## 2025-06-16 VITALS — HEIGHT: 72 IN | BODY MASS INDEX: 23.33 KG/M2

## 2025-06-16 RX ORDER — SULFAMETHOXAZOLE AND TRIMETHOPRIM 800; 160 MG/1; MG/1
TABLET ORAL
COMMUNITY
Start: 2025-06-03

## 2025-06-16 RX ORDER — GABAPENTIN 300 MG/1
300 CAPSULE ORAL 3 TIMES DAILY PRN
COMMUNITY
Start: 2025-05-03

## 2025-06-16 NOTE — PREPROCEDURE INSTRUCTIONS
Pre-admit telephone appointment completed.     Pt is add on for surgery tomorrow. Note last A1C=9.6 done 12/12/24. Pt will need A1C & EKG DOS.    Pt has held Xarelto for one week, but was just told to hold plavix today. Last Plavix 6/15/25. Pt also instructed to hold Farxiga now. Understanding voiced.     Called Dr Oneil's office and left message for Huang to notify of last A1C, and  regarding meds as above.

## 2025-06-16 NOTE — PREADMIT AVS NOTE
Current Medications   Medication Instructions    gabapentin (NEURONTIN) 300 MG Cap As needed medication, may take if needed, including morning of procedure     sulfamethoxazole-trimethoprim (BACTRIM DS) 800-160 MG tablet Continue taking medication as prescribed, including morning of procedure     multivitamin Tab CONTINUE TO HOLD    metFORMIN (GLUCOPHAGE) 500 MG Tab Hold medication day of procedure    XARELTO 20 MG Tab tablet CONTINUE TO HOLD    atorvastatin (LIPITOR) 80 MG tablet Continue taking as prescribed.    FARXIGA 10 MG Tab HOLD NOW    glipiZIDE ER (GLUCOTROL XL) 2.5 MG TABLET SR 24 HR Hold medication day of procedure    latanoprost (XALATAN) 0.005 % Solution Continue taking as prescribed.    metoprolol SR (TOPROL XL) 25 MG TABLET SR 24 HR Continue taking as prescribed.    clopidogrel (PLAVIX) 75 MG Tab CONTINUE TO HOLD    cyclobenzaprine (FLEXERIL) 10 mg Tab Continue taking as prescribed.    Evolocumab (REPATHA) Solution Prefilled Syringe SubQ injection syringe HOLD NOW

## 2025-06-17 ENCOUNTER — ANESTHESIA EVENT (OUTPATIENT)
Dept: SURGERY | Facility: MEDICAL CENTER | Age: 60
End: 2025-06-17
Payer: COMMERCIAL

## 2025-06-17 ENCOUNTER — APPOINTMENT (OUTPATIENT)
Dept: RADIOLOGY | Facility: MEDICAL CENTER | Age: 60
End: 2025-06-17
Attending: PODIATRIST
Payer: COMMERCIAL

## 2025-06-17 ENCOUNTER — HOSPITAL ENCOUNTER (OUTPATIENT)
Facility: MEDICAL CENTER | Age: 60
End: 2025-06-17
Attending: PODIATRIST | Admitting: PODIATRIST
Payer: COMMERCIAL

## 2025-06-17 ENCOUNTER — ANESTHESIA (OUTPATIENT)
Dept: SURGERY | Facility: MEDICAL CENTER | Age: 60
End: 2025-06-17
Payer: COMMERCIAL

## 2025-06-17 VITALS
HEIGHT: 72 IN | OXYGEN SATURATION: 94 % | HEART RATE: 81 BPM | RESPIRATION RATE: 16 BRPM | DIASTOLIC BLOOD PRESSURE: 78 MMHG | BODY MASS INDEX: 23.16 KG/M2 | WEIGHT: 170.97 LBS | SYSTOLIC BLOOD PRESSURE: 124 MMHG | TEMPERATURE: 97 F

## 2025-06-17 LAB
EKG IMPRESSION: NORMAL
EST. AVERAGE GLUCOSE BLD GHB EST-MCNC: 232 MG/DL
GLUCOSE BLD STRIP.AUTO-MCNC: 146 MG/DL (ref 65–99)
GLUCOSE BLD STRIP.AUTO-MCNC: 174 MG/DL (ref 65–99)
HBA1C MFR BLD: 9.7 % (ref 4–5.6)
PATHOLOGY CONSULT NOTE: NORMAL

## 2025-06-17 PROCEDURE — 87075 CULTR BACTERIA EXCEPT BLOOD: CPT | Mod: 91

## 2025-06-17 PROCEDURE — 700105 HCHG RX REV CODE 258: Performed by: PODIATRIST

## 2025-06-17 PROCEDURE — 93005 ELECTROCARDIOGRAM TRACING: CPT | Mod: TC | Performed by: PODIATRIST

## 2025-06-17 PROCEDURE — 88305 TISSUE EXAM BY PATHOLOGIST: CPT | Mod: 26 | Performed by: PATHOLOGY

## 2025-06-17 PROCEDURE — 700111 HCHG RX REV CODE 636 W/ 250 OVERRIDE (IP): Mod: JZ | Performed by: ANESTHESIOLOGY

## 2025-06-17 PROCEDURE — 88311 DECALCIFY TISSUE: CPT | Mod: 26 | Performed by: PATHOLOGY

## 2025-06-17 PROCEDURE — 88305 TISSUE EXAM BY PATHOLOGIST: CPT | Performed by: PATHOLOGY

## 2025-06-17 PROCEDURE — 160046 HCHG PACU - 1ST 60 MINS PHASE II: Performed by: PODIATRIST

## 2025-06-17 PROCEDURE — 82962 GLUCOSE BLOOD TEST: CPT | Performed by: PODIATRIST

## 2025-06-17 PROCEDURE — 87070 CULTURE OTHR SPECIMN AEROBIC: CPT | Mod: 91

## 2025-06-17 PROCEDURE — A9270 NON-COVERED ITEM OR SERVICE: HCPCS | Performed by: ANESTHESIOLOGY

## 2025-06-17 PROCEDURE — 87205 SMEAR GRAM STAIN: CPT

## 2025-06-17 PROCEDURE — 160025 RECOVERY II MINUTES (STATS): Performed by: PODIATRIST

## 2025-06-17 PROCEDURE — 88311 DECALCIFY TISSUE: CPT | Performed by: PATHOLOGY

## 2025-06-17 PROCEDURE — 700102 HCHG RX REV CODE 250 W/ 637 OVERRIDE(OP): Performed by: ANESTHESIOLOGY

## 2025-06-17 PROCEDURE — 87076 CULTURE ANAEROBE IDENT EACH: CPT | Mod: 91

## 2025-06-17 PROCEDURE — 160048 HCHG OR STATISTICAL LEVEL 1-5: Performed by: PODIATRIST

## 2025-06-17 PROCEDURE — 87077 CULTURE AEROBIC IDENTIFY: CPT | Mod: 91

## 2025-06-17 PROCEDURE — 160002 HCHG RECOVERY MINUTES (STAT): Performed by: PODIATRIST

## 2025-06-17 PROCEDURE — 93010 ELECTROCARDIOGRAM REPORT: CPT | Performed by: INTERNAL MEDICINE

## 2025-06-17 PROCEDURE — 700101 HCHG RX REV CODE 250: Performed by: ANESTHESIOLOGY

## 2025-06-17 PROCEDURE — V2790 AMNIOTIC MEMBRANE: HCPCS | Performed by: PODIATRIST

## 2025-06-17 PROCEDURE — 160193 HCHG PACU STANDARD - 1ST 60 MINS: Performed by: PODIATRIST

## 2025-06-17 PROCEDURE — 160015 HCHG STAT PREOP MINUTES: Performed by: PODIATRIST

## 2025-06-17 PROCEDURE — 160192 HCHG ANESTHESIA COMPLEX: Performed by: PODIATRIST

## 2025-06-17 PROCEDURE — 160028 HCHG SURGERY MINUTES - 1ST 30 MINS LEVEL 3: Performed by: PODIATRIST

## 2025-06-17 PROCEDURE — 36415 COLL VENOUS BLD VENIPUNCTURE: CPT

## 2025-06-17 PROCEDURE — 160039 HCHG SURGERY MINUTES - EA ADDL 1 MIN LEVEL 3: Performed by: PODIATRIST

## 2025-06-17 PROCEDURE — 83036 HEMOGLOBIN GLYCOSYLATED A1C: CPT

## 2025-06-17 PROCEDURE — 87186 SC STD MICRODIL/AGAR DIL: CPT

## 2025-06-17 DEVICE — GRAFT SOFT TISSUE AMNIOCORD L5 CM X W3 CM (1EA): Type: IMPLANTABLE DEVICE | Status: FUNCTIONAL

## 2025-06-17 RX ORDER — DEXTROSE MONOHYDRATE 25 G/50ML
25 INJECTION, SOLUTION INTRAVENOUS
Status: DISCONTINUED | OUTPATIENT
Start: 2025-06-17 | End: 2025-06-17 | Stop reason: HOSPADM

## 2025-06-17 RX ORDER — OXYCODONE HCL 5 MG/5 ML
5 SOLUTION, ORAL ORAL
Status: DISCONTINUED | OUTPATIENT
Start: 2025-06-17 | End: 2025-06-17 | Stop reason: HOSPADM

## 2025-06-17 RX ORDER — HYDROMORPHONE HYDROCHLORIDE 1 MG/ML
0.4 INJECTION, SOLUTION INTRAMUSCULAR; INTRAVENOUS; SUBCUTANEOUS
Status: DISCONTINUED | OUTPATIENT
Start: 2025-06-17 | End: 2025-06-17 | Stop reason: HOSPADM

## 2025-06-17 RX ORDER — MIDAZOLAM HYDROCHLORIDE 1 MG/ML
1 INJECTION INTRAMUSCULAR; INTRAVENOUS
Status: DISCONTINUED | OUTPATIENT
Start: 2025-06-17 | End: 2025-06-17 | Stop reason: HOSPADM

## 2025-06-17 RX ORDER — CEFAZOLIN SODIUM 1 G/3ML
INJECTION, POWDER, FOR SOLUTION INTRAMUSCULAR; INTRAVENOUS PRN
Status: DISCONTINUED | OUTPATIENT
Start: 2025-06-17 | End: 2025-06-17 | Stop reason: SURG

## 2025-06-17 RX ORDER — ALBUTEROL SULFATE 5 MG/ML
2.5 SOLUTION RESPIRATORY (INHALATION)
Status: DISCONTINUED | OUTPATIENT
Start: 2025-06-17 | End: 2025-06-17 | Stop reason: HOSPADM

## 2025-06-17 RX ORDER — EPHEDRINE SULFATE 50 MG/ML
INJECTION, SOLUTION INTRAVENOUS PRN
Status: DISCONTINUED | OUTPATIENT
Start: 2025-06-17 | End: 2025-06-17 | Stop reason: SURG

## 2025-06-17 RX ORDER — ACETAMINOPHEN 500 MG
1000 TABLET ORAL ONCE
Status: COMPLETED | OUTPATIENT
Start: 2025-06-17 | End: 2025-06-17

## 2025-06-17 RX ORDER — INSULIN LISPRO 100 [IU]/ML
2-9 INJECTION, SOLUTION INTRAVENOUS; SUBCUTANEOUS EVERY 6 HOURS
Status: DISCONTINUED | OUTPATIENT
Start: 2025-06-17 | End: 2025-06-17 | Stop reason: HOSPADM

## 2025-06-17 RX ORDER — SODIUM CHLORIDE, SODIUM LACTATE, POTASSIUM CHLORIDE, CALCIUM CHLORIDE 600; 310; 30; 20 MG/100ML; MG/100ML; MG/100ML; MG/100ML
INJECTION, SOLUTION INTRAVENOUS CONTINUOUS
Status: DISCONTINUED | OUTPATIENT
Start: 2025-06-17 | End: 2025-06-17 | Stop reason: HOSPADM

## 2025-06-17 RX ORDER — SODIUM CHLORIDE, SODIUM LACTATE, POTASSIUM CHLORIDE, CALCIUM CHLORIDE 600; 310; 30; 20 MG/100ML; MG/100ML; MG/100ML; MG/100ML
INJECTION, SOLUTION INTRAVENOUS CONTINUOUS
Status: ACTIVE | OUTPATIENT
Start: 2025-06-17 | End: 2025-06-17

## 2025-06-17 RX ORDER — HYDRALAZINE HYDROCHLORIDE 20 MG/ML
5 INJECTION INTRAMUSCULAR; INTRAVENOUS
Status: DISCONTINUED | OUTPATIENT
Start: 2025-06-17 | End: 2025-06-17 | Stop reason: HOSPADM

## 2025-06-17 RX ORDER — ONDANSETRON 2 MG/ML
4 INJECTION INTRAMUSCULAR; INTRAVENOUS
Status: DISCONTINUED | OUTPATIENT
Start: 2025-06-17 | End: 2025-06-17 | Stop reason: HOSPADM

## 2025-06-17 RX ORDER — DIPHENHYDRAMINE HYDROCHLORIDE 50 MG/ML
12.5 INJECTION, SOLUTION INTRAMUSCULAR; INTRAVENOUS
Status: DISCONTINUED | OUTPATIENT
Start: 2025-06-17 | End: 2025-06-17 | Stop reason: HOSPADM

## 2025-06-17 RX ORDER — MEPERIDINE HYDROCHLORIDE 25 MG/ML
12.5 INJECTION INTRAMUSCULAR; INTRAVENOUS; SUBCUTANEOUS
Status: DISCONTINUED | OUTPATIENT
Start: 2025-06-17 | End: 2025-06-17 | Stop reason: HOSPADM

## 2025-06-17 RX ORDER — MIDAZOLAM HYDROCHLORIDE 1 MG/ML
INJECTION INTRAMUSCULAR; INTRAVENOUS PRN
Status: DISCONTINUED | OUTPATIENT
Start: 2025-06-17 | End: 2025-06-17 | Stop reason: SURG

## 2025-06-17 RX ORDER — HYDROMORPHONE HYDROCHLORIDE 1 MG/ML
0.2 INJECTION, SOLUTION INTRAMUSCULAR; INTRAVENOUS; SUBCUTANEOUS
Status: DISCONTINUED | OUTPATIENT
Start: 2025-06-17 | End: 2025-06-17 | Stop reason: HOSPADM

## 2025-06-17 RX ORDER — PHENYLEPHRINE HCL IN 0.9% NACL 1 MG/10 ML
SYRINGE (ML) INTRAVENOUS PRN
Status: DISCONTINUED | OUTPATIENT
Start: 2025-06-17 | End: 2025-06-17 | Stop reason: SURG

## 2025-06-17 RX ORDER — EPHEDRINE SULFATE 50 MG/ML
5 INJECTION, SOLUTION INTRAVENOUS
Status: DISCONTINUED | OUTPATIENT
Start: 2025-06-17 | End: 2025-06-17 | Stop reason: HOSPADM

## 2025-06-17 RX ORDER — METOCLOPRAMIDE HYDROCHLORIDE 5 MG/ML
INJECTION INTRAMUSCULAR; INTRAVENOUS PRN
Status: DISCONTINUED | OUTPATIENT
Start: 2025-06-17 | End: 2025-06-17 | Stop reason: SURG

## 2025-06-17 RX ORDER — LIDOCAINE HYDROCHLORIDE 20 MG/ML
INJECTION, SOLUTION EPIDURAL; INFILTRATION; INTRACAUDAL; PERINEURAL PRN
Status: DISCONTINUED | OUTPATIENT
Start: 2025-06-17 | End: 2025-06-17 | Stop reason: SURG

## 2025-06-17 RX ORDER — OXYCODONE HCL 5 MG/5 ML
10 SOLUTION, ORAL ORAL
Status: DISCONTINUED | OUTPATIENT
Start: 2025-06-17 | End: 2025-06-17 | Stop reason: HOSPADM

## 2025-06-17 RX ORDER — ONDANSETRON 2 MG/ML
INJECTION INTRAMUSCULAR; INTRAVENOUS PRN
Status: DISCONTINUED | OUTPATIENT
Start: 2025-06-17 | End: 2025-06-17 | Stop reason: SURG

## 2025-06-17 RX ORDER — LABETALOL HYDROCHLORIDE 5 MG/ML
5 INJECTION, SOLUTION INTRAVENOUS
Status: DISCONTINUED | OUTPATIENT
Start: 2025-06-17 | End: 2025-06-17 | Stop reason: HOSPADM

## 2025-06-17 RX ORDER — HALOPERIDOL 5 MG/ML
1 INJECTION INTRAMUSCULAR
Status: DISCONTINUED | OUTPATIENT
Start: 2025-06-17 | End: 2025-06-17 | Stop reason: HOSPADM

## 2025-06-17 RX ORDER — HYDROMORPHONE HYDROCHLORIDE 1 MG/ML
0.1 INJECTION, SOLUTION INTRAMUSCULAR; INTRAVENOUS; SUBCUTANEOUS
Status: DISCONTINUED | OUTPATIENT
Start: 2025-06-17 | End: 2025-06-17 | Stop reason: HOSPADM

## 2025-06-17 RX ADMIN — EPHEDRINE SULFATE 5 MG: 50 INJECTION, SOLUTION INTRAVENOUS at 17:55

## 2025-06-17 RX ADMIN — Medication 50 MCG: at 17:56

## 2025-06-17 RX ADMIN — EPHEDRINE SULFATE 10 MG: 50 INJECTION, SOLUTION INTRAVENOUS at 17:29

## 2025-06-17 RX ADMIN — EPHEDRINE SULFATE 5 MG: 50 INJECTION, SOLUTION INTRAVENOUS at 17:50

## 2025-06-17 RX ADMIN — ACETAMINOPHEN 1000 MG: 500 TABLET ORAL at 16:02

## 2025-06-17 RX ADMIN — METOCLOPRAMIDE HYDROCHLORIDE 5 MG: 5 INJECTION INTRAMUSCULAR; INTRAVENOUS at 17:23

## 2025-06-17 RX ADMIN — METOCLOPRAMIDE HYDROCHLORIDE 5 MG: 5 INJECTION INTRAMUSCULAR; INTRAVENOUS at 17:26

## 2025-06-17 RX ADMIN — MIDAZOLAM HYDROCHLORIDE 1 MG: 1 INJECTION, SOLUTION INTRAMUSCULAR; INTRAVENOUS at 17:15

## 2025-06-17 RX ADMIN — CEFAZOLIN 2 G: 1 INJECTION, POWDER, FOR SOLUTION INTRAMUSCULAR; INTRAVENOUS at 17:16

## 2025-06-17 RX ADMIN — PROPOFOL 100 MG: 10 INJECTION, EMULSION INTRAVENOUS at 17:21

## 2025-06-17 RX ADMIN — Medication 100 MCG: at 17:29

## 2025-06-17 RX ADMIN — ONDANSETRON 4 MG: 2 INJECTION INTRAMUSCULAR; INTRAVENOUS at 17:26

## 2025-06-17 RX ADMIN — FENTANYL CITRATE 25 MCG: 50 INJECTION, SOLUTION INTRAMUSCULAR; INTRAVENOUS at 17:40

## 2025-06-17 RX ADMIN — SODIUM CHLORIDE, POTASSIUM CHLORIDE, SODIUM LACTATE AND CALCIUM CHLORIDE: 600; 310; 30; 20 INJECTION, SOLUTION INTRAVENOUS at 16:02

## 2025-06-17 RX ADMIN — FENTANYL CITRATE 50 MCG: 50 INJECTION, SOLUTION INTRAMUSCULAR; INTRAVENOUS at 17:17

## 2025-06-17 RX ADMIN — Medication 50 MCG: at 17:51

## 2025-06-17 RX ADMIN — LIDOCAINE HYDROCHLORIDE 75 MG: 20 INJECTION, SOLUTION EPIDURAL; INFILTRATION; INTRACAUDAL; PERINEURAL at 17:21

## 2025-06-17 ASSESSMENT — PAIN DESCRIPTION - PAIN TYPE
TYPE: SURGICAL PAIN

## 2025-06-17 ASSESSMENT — FIBROSIS 4 INDEX: FIB4 SCORE: 1.04

## 2025-06-17 ASSESSMENT — PAIN SCALES - GENERAL: PAIN_LEVEL: 0

## 2025-06-17 NOTE — OR NURSING
Pt allergies and NPO status verified. Home medications reconciled, preferred pharmacy verified. Belongings secured in locker. Pt verbalizes understanding of pain scale, expected course of stay, and plan of care. Surgical site verified with pt, wound vac and dressing to RLE in place. IV access established. All questions answered. Bed in lowest position, call light within reach.

## 2025-06-17 NOTE — ANESTHESIA PREPROCEDURE EVALUATION
Case: [id number] Date/Time: 06/17/25 1700    Procedures:       RIGHT FOOT PARTIAL FIRST RAY AMPUTATION, SESAMOIDECTOMY, EXCISIONAL DEBRIDEMENT OF RIGHT FOOT ULCERATION WITH APPLICATION OF SKIN SUBSTITUTE AND WOUND VAC      EXCISION, SESAMOID BONE, TOE      APPLICATION OR REPLACEMENT, WOUND VAC      IRRIGATION AND DEBRIDEMENT, WOUND      APPLICATION, SKIN SUBSTITUTE    Pre-op diagnosis: OTHER ACUTE OSTEOMYELITIS RIGHT ANKLE AND FOOT    Location: [facility] OR 03 / SURGERY [facility]    Surgeons: Rich Oneil D.P.M.          58 yo male    P Med Hx:  Diabetes   Hypertension  Hyperlipidemia  CAD (coronary artery disease)  High cholesterol  Arrhythmia  Dental disorder  Hemorrhagic disorder   Blood clotting disorder   Stroke  Glaucoma  Pain  PVD (peripheral vascular disease)  CAD (coronary artery disease)  Anemia  Relevant Problems   NEURO   (positive) Right sided cerebral hemisphere cerebrovascular accident (CVA) (HCC)   (positive) TIA (transient ischemic attack)      CARDIAC   (positive) CAD (coronary artery disease)   (positive) HTN (hypertension)   (positive) Lower extremity arterial insufficiency, severe, right (HCC)      ENDO   (positive) Uncontrolled type 2 diabetes mellitus with hyperglycemia (HCC)      Other   (positive) Osteomyelitis of left foot (HCC)       Physical Exam    Airway   Mallampati: II  TM distance: >3 FB  Neck ROM: full       Cardiovascular - normal exam  Rhythm: regular  Rate: normal    (-) murmur     Dental - normal exam           Pulmonary - normal examBreath sounds clear to auscultation     Abdominal    Neurological - normal exam                   Anesthesia Plan    ASA 3   ASA physical status 3 criteria: CVA or TIA - history (> 3 months)    Plan - general       Airway plan will be LMA          Induction: intravenous    Postoperative Plan: Postoperative administration of opioids is intended.    Pertinent diagnostic labs and testing reviewed    Informed Consent:    Anesthetic plan and risks discussed  with patient.    Use of blood products discussed with: patient whom consented to blood products.

## 2025-06-18 LAB
GRAM STN SPEC: NORMAL
GRAM STN SPEC: NORMAL
SIGNIFICANT IND 70042: NORMAL
SIGNIFICANT IND 70042: NORMAL
SITE SITE: NORMAL
SITE SITE: NORMAL
SOURCE SOURCE: NORMAL
SOURCE SOURCE: NORMAL

## 2025-06-18 NOTE — ANESTHESIA PROCEDURE NOTES
Airway    Date/Time: 6/17/2025 5:21 PM    Performed by: Marco Antonio Mendenhall M.D.  Authorized by: Marco Antonio Mendenhall M.D.    Location:  OR  Urgency:  Elective  Indications for Airway Management:  Anesthesia      Spontaneous Ventilation: absent    Sedation Level:  Deep  Preoxygenated: Yes    Mask Difficulty Assessment:  0 - not attempted  Final Airway Type:  Supraglottic airway  Final Supraglottic Airway:  Standard LMA    SGA Size:  4  Cuff Pressure (cm H2O):  12  Number of Attempts at Approach:  1

## 2025-06-18 NOTE — DISCHARGE INSTRUCTIONS
If any questions arise, call your provider.      MEDICATIONS: Resume taking daily medication.  Take prescribed pain medication with food.  If no medication is prescribed, you may take non-aspirin pain medication if needed.  PAIN MEDICATION CAN BE VERY CONSTIPATING.  Take a stool softener or laxative such as senokot, pericolace, or milk of magnesia if needed.    What to Expect Post Anesthesia    Rest and take it easy for the first 24 hours.  A responsible adult is recommended to remain with you during that time.  It is normal to feel sleepy.  We encourage you to not do anything that requires balance, judgment or coordination.    FOR 24 HOURS DO NOT:  Drive, operate machinery or run household appliances.  Drink beer or alcoholic beverages.  Make important decisions or sign legal documents.    To avoid nausea, slowly advance diet as tolerated, avoiding spicy or greasy foods for the first day.  Add more substantial food to your diet according to your provider's instructions.  Babies can be fed formula or breast milk as soon as they are hungry.  INCREASE FLUIDS AND FIBER TO AVOID CONSTIPATION.    MILD FLU-LIKE SYMPTOMS ARE NORMAL.  YOU MAY EXPERIENCE GENERALIZED MUSCLE ACHES, THROAT IRRITATION, HEADACHE AND/OR SOME NAUSEA.

## 2025-06-18 NOTE — OR NURSING
1823 - Pt to PACU from OR. Report from anesthesia and OR RN. On 6L O2 via mask. Respirations even and unlabored. VSS.   Dressing to rt foot CDI, wound vac present and functioning.    1835 -     1905 - Pt spouse updated by telephone. Pt states pain tolerable. Pt remains drowsy.    1912 - pt tolerating po fluids.    1935 - MD Oneil paged for pt family question clarifications.    1940 - MD returned call. Dressing to stay in place for 1 week at which point needs to be changed by wound care. MD to call in pain medication but pt may take 800mg ibuprofen every 8 hours tonight. Ok to resume plavix and aspirin tomorrow. Pt spouse verbalizes understanding.    1953 - Discharge instructions reviewed with and signed by pt spouse. All questions answered and verbalized understanding.     2003 - Pt wheeled to car via wheelchair by RN. PIV removed. NAD. VSS. Belongings with patient.

## 2025-06-18 NOTE — ANESTHESIA POSTPROCEDURE EVALUATION
Patient: Israel Aviles    Procedure Summary       Date: 06/17/25 Room / Location:  OR  / SURGERY [facility]    Anesthesia Start: 1716 Anesthesia Stop: 1828    Procedures:       RIGHT FOOT PARTIAL FIRST RAY AMPUTATION, SESAMOIDECTOMY, EXCISIONAL DEBRIDEMENT OF RIGHT FOOT ULCERATION WITH APPLICATION OF SKIN SUBSTITUTE AND WOUND VAC (Right: Foot)      EXCISION, SESAMOID BONE, TOE (Right: Foot)      APPLICATION OR REPLACEMENT, WOUND VAC (Right: Foot)      IRRIGATION AND DEBRIDEMENT, WOUND (Right: Foot)      APPLICATION, SKIN SUBSTITUTE (Right: Foot) Diagnosis: (OTHER ACUTE OSTEOMYELITIS RIGHT ANKLE AND FOOT)    Surgeons: Rich Oneil D.P.M. Responsible Provider: Marco Antonio Mendenhall M.D.    Anesthesia Type: general ASA Status: 3            Final Anesthesia Type: general  Last vitals  BP   Blood Pressure: 113/70    Temp   36 °C (96.8 °F)    Pulse   91   Resp   16    SpO2   98 %      Anesthesia Post Evaluation    Patient location during evaluation: PACU  Patient participation: complete - patient participated  Level of consciousness: awake and alert  Pain score: 0    Airway patency: patent  Anesthetic complications: no  Cardiovascular status: hemodynamically stable  Respiratory status: acceptable  Hydration status: euvolemic    PONV: none          No notable events documented.     Nurse Pain Score: 2 (NPRS)

## 2025-06-18 NOTE — ANESTHESIA TIME REPORT
Anesthesia Start and Stop Event Times       Date Time Event    6/17/2025 1556 Ready for Procedure     1716 Anesthesia Start     1828 Anesthesia Stop          Responsible Staff  06/17/25      Name Role Begin End    Marco Antonio Mnedenhall M.D. Anesth 1716 1828          Overtime Reason:  no overtime (within assigned shift)    Comments:

## 2025-06-18 NOTE — OP REPORT
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  Aerobic anaerobic swab culture right great toe  2.  Aerobic anaerobic swab culture right dorsal foot ulceration  3.  Right great toe for permanent           COMPLICATIONS: None           CONDITION: Good    IMPLANTS: 3 x 5 Epicord graft x 2. Wound vac           OPERATIVE INDICATIONS AND DESCRIPTION OF PROCEDURE:     I met the patient in the preoperative holding area.  I had a full discussion with them regarding multiple options for the patient's condition including nonoperative management.  Again today I offered them nonoperative treatment modalities.  Regarding operative options I specifically today's procedure as outlined. I discussed some possible complications including bleeding possibly requiring transfusion, infection, neurovascular damage, malunion, nonunion, failure of implants, failure of surgery, chronic pain, need for revision surgery, instability, limb length discrepancy, prolonged rehab, weight bearing  restrictions, DVT, PE, MI, stroke and death. After going over risks and benefits making no promises either guaranteed or implied the patient elected to proceed.  At this point informed consent was signed.  A surgical marking pen was used to place my initials on the patient's right lower extremity.    Patient was brought back to the operating room and placed supine on a regular table all bony prominences padded very well to prevent neuropraxia's.  They were secured to the table with a strap. Anesthesia was introduced.  Bone foam used for for positioning.  At this point a tourniquet was placed on the right calf and then the lower extremity was prepped in sterile standard fashion.  A timeout was performed with all parties in the room ceasing activity. Informed consent sheet was visible confirming the patient's name date of birth MRN and matched their wristband. Antibiotics were confirmed and the extremity was confirmed as the correct surgical site.  My surgical initials were visible on this extremity.  At this point we were cleared to proceed with the procedure.     Attention was directed to the right foot where a fishmouth incision was created on the great toe extending up the first metatarsal.  The incision was carried down to the level of bone and the toe was disarticulated at the metatarsal phalangeal joint.  Appropriate cultures were then taken of the great toe.  The incision was followed along the medial aspect of the first metatarsal and the distal aspect of the first metatarsal was transected in the appropriate manner using a microsagittal saw.  This was removed and passed to the back table.  The sesamoid apparatus was then visualized and removed using a 15 blade.  The area was then irrigated copiously with 1500 mL normal sterile saline using a pulse irrigation system.  Hemostasis was then achieved using electrocautery.  The deep tissues were closed with Vicryl and the skin was closed using nylon  suture.    Attention was then directed to the dorsal right foot wound where extensive debridement down to and including muscle was performed.  There is noted to be a moderate amount of necrosis present.  The extensor tendons were visualized throughout the entire dorsal ulceration.  All necrotic tissue was removed.  Appropriate cultures were taken at this time.  The ulceration was then irrigated with 1500 mL normal sterile saline using a pulse irrigation system.  The leg was then elevated and the pneumatic tourniquet was then inflated.  Two 3 cm x 5 cm Epicord skin substitute grafts were then placed overlying the ulceration site.  A wound VAC was then placed overlying the dorsal foot ulceration and adhered appropriately to the skin.  The wound VAC was connected and set to 125 mmHg continuous suction and there was noted to be good seal with no leaks.  The pneumatic tourniquet was then deflated.  Prompt hyperemic response was noted to all remaining digits of the right foot.    Dry, sterile dressings were then applied to the operative foot and ankle. The patient tolerated this procedure and anesthesia well.  The patient was transferred from the operating room to the recovery room with vital stable neurovascular status intact to the operative extremity.  Routine postop medications were prescribed and postop instructions have been given to the responsible party.    The patient was released to return home in satisfactory condition.  Patient remains at extremely high risk for proximal limb loss.  There is extensive soft tissue damage to the dorsal aspect of the foot and he has a long road ahead of him to be able to fully heal and recover from his foot ulceration and soft tissue damage.  He will continue going to wound care until further notice.    Did call the patient's family to discuss the surgery with them.  Counseled them on signs of infection which they understood.  If they have any issues including any concerns with  infection including redness drainage swelling pain they are to call our office immediately or go to the local emergency department    This is dictated with voice recognition software please excuse any errors    JEWELL Pugh

## 2025-06-20 ENCOUNTER — APPOINTMENT (OUTPATIENT)
Dept: CARDIOLOGY | Facility: MEDICAL CENTER | Age: 60
End: 2025-06-20
Attending: INTERNAL MEDICINE
Payer: COMMERCIAL

## 2025-06-20 LAB
BACTERIA WND AEROBE CULT: ABNORMAL
GRAM STN SPEC: ABNORMAL
GRAM STN SPEC: ABNORMAL
SIGNIFICANT IND 70042: ABNORMAL
SIGNIFICANT IND 70042: ABNORMAL
SITE SITE: ABNORMAL
SITE SITE: ABNORMAL
SOURCE SOURCE: ABNORMAL
SOURCE SOURCE: ABNORMAL

## 2025-06-21 LAB
BACTERIA SPEC ANAEROBE CULT: ABNORMAL
SIGNIFICANT IND 70042: ABNORMAL
SIGNIFICANT IND 70042: ABNORMAL
SITE SITE: ABNORMAL
SITE SITE: ABNORMAL
SOURCE SOURCE: ABNORMAL
SOURCE SOURCE: ABNORMAL

## 2025-07-01 DIAGNOSIS — H57.89: ICD-10-CM

## 2025-07-01 DIAGNOSIS — E11.65 UNCONTROLLED TYPE 2 DIABETES MELLITUS WITH HYPERGLYCEMIA (HCC): Primary | ICD-10-CM

## 2025-07-01 DIAGNOSIS — S88.112S BELOW-KNEE AMPUTATION OF LEFT LOWER EXTREMITY, SEQUELA (HCC): ICD-10-CM

## 2025-07-01 DIAGNOSIS — I73.9 PVD (PERIPHERAL VASCULAR DISEASE) (HCC): ICD-10-CM

## 2025-07-01 DIAGNOSIS — E78.2 MIXED HYPERLIPIDEMIA: ICD-10-CM

## 2025-07-01 DIAGNOSIS — E11.42 DIABETIC POLYNEUROPATHY ASSOCIATED WITH TYPE 2 DIABETES MELLITUS (HCC): ICD-10-CM

## 2025-07-01 DIAGNOSIS — I25.10 CORONARY ARTERY DISEASE DUE TO LIPID RICH PLAQUE: ICD-10-CM

## 2025-07-01 DIAGNOSIS — I25.83 CORONARY ARTERY DISEASE DUE TO LIPID RICH PLAQUE: ICD-10-CM

## 2025-07-01 PROCEDURE — RXMED WILLOW AMBULATORY MEDICATION CHARGE

## 2025-07-01 RX ORDER — EVOLOCUMAB 140 MG/ML
140 INJECTION, SOLUTION SUBCUTANEOUS
Qty: 6 ML | Refills: 0 | Status: SHIPPED | OUTPATIENT
Start: 2025-07-01

## 2025-07-01 NOTE — PROGRESS NOTES
Will refer to Dr Nath for help with optimal medical management of hsi poorly controlled Diabetes.

## 2025-07-03 ENCOUNTER — TELEPHONE (OUTPATIENT)
Dept: ENDOCRINOLOGY | Facility: MEDICAL CENTER | Age: 60
End: 2025-07-03
Payer: COMMERCIAL

## 2025-07-03 NOTE — Clinical Note
REFERRAL APPROVAL NOTICE         Sent on July 3, 2025                   Manan Aviles  [address]  [place] [address]                   Dear Mr. Aviles,    After a careful review of the medical information and benefit coverage, [facility] has processed your referral. See below for additional details.    If applicable, you must be actively enrolled with your insurance for coverage of the authorized service. If you have any questions regarding your coverage, please contact your insurance directly.    REFERRAL INFORMATION   Referral #:  [id number]  Referred-To Provider    Referred-By Provider:  Ophthalmology    SREEDHAR Chaparro HARDEEP S      [address]  [place] [address]  [phone] [address] [place] [address]  [place] [id number] [phone]    Referral Start Date:  07/01/2025  Referral End Date:   12/31/2025             SCHEDULING  If you do not already have an appointment, please call [phone] to make an appointment.     MORE INFORMATION  If you do not already have a [employer]en account, sign up at: [email or web address]  You can access your medical information, make appointments, see lab results, billing information, and more.  If you have questions regarding this referral, please contact  the [facility] [place] Referrals department at:             [phone]. Monday - Friday 8:00AM - 5:00PM.     Sincerely,    [facility]

## 2025-07-03 NOTE — TELEPHONE ENCOUNTER
Nurse from [facility] called because they wanted to see if Dr. Nath will still be able to accept the pt as they talked to a PAR a few days and they were not updated.    I let them know we have received the Referral it is currently being processed and once it has our  will reach out to schedule them.    They asked how long it would be I let them it can take up to 1- 2 weeks depending on the insurance.

## 2025-07-10 ENCOUNTER — PHARMACY VISIT (OUTPATIENT)
Dept: PHARMACY | Facility: MEDICAL CENTER | Age: 60
End: 2025-07-10
Payer: COMMERCIAL

## 2025-07-15 NOTE — Clinical Note
REFERRAL APPROVAL NOTICE         Sent on July 15, 2025                   Manan Aviles  [address] [place] [address]                   Dear Mr. Aviles,    After a careful review of the medical information and benefit coverage, [facility] has processed your referral. See below for additional details.    If applicable, you must be actively enrolled with your insurance for coverage of the authorized service. If you have any questions regarding your coverage, please contact your insurance directly.    REFERRAL INFORMATION   Referral #:  [id number]  Referred-To Department    Referred-By Provider:  Endocrinology    Donny Mcnally M.D.   Endocrinology [facility]      [address]  [place] [address]  [phone] [address]  [facility] [id number] [phone]    Referral Start Date:  07/11/2025  Referral End Date:   07/10/2026           SCHEDULING  If you do not already have an appointment, please call [phone] to make an appointment.   MORE INFORMATION  As a reminder, [facility] ownership has changed, meaning this location is now owned and operated by [facility]. As such, we want to clarify that our patients should expect to receive two separate bills for the services received at [facility] - one representing the [facility] facility fees as the owner of the establishment, and the other to represent the physician's services and subsequent fees. You can speak with your insurance carrier for a pricing estimate by calling the customer service number on the back of your card and ask about charges for a hospital outpatient visit.  If you do not already have a [employer] account, sign up at: [email or web address]  You can access your medical information, make appointments, see lab results, billing information, and more.  If you have questions regarding this referral, please contact  the [facility] Referrals department at:             [phone].  Monday - Friday 7:30AM - 5:00PM.      Sincerely,  [facility]

## 2025-08-04 ENCOUNTER — APPOINTMENT (OUTPATIENT)
Dept: MEDICAL GROUP | Facility: PHYSICIAN GROUP | Age: 60
End: 2025-08-04
Payer: COMMERCIAL

## 2025-08-07 ENCOUNTER — SPECIALTY PHARMACY (OUTPATIENT)
Dept: VASCULAR LAB | Facility: MEDICAL CENTER | Age: 60
End: 2025-08-07
Payer: COMMERCIAL

## 2025-08-07 PROCEDURE — RXMED WILLOW AMBULATORY MEDICATION CHARGE

## 2025-08-11 ENCOUNTER — APPOINTMENT (OUTPATIENT)
Dept: MEDICAL GROUP | Facility: PHYSICIAN GROUP | Age: 60
End: 2025-08-11
Payer: COMMERCIAL

## 2025-08-12 ENCOUNTER — PHARMACY VISIT (OUTPATIENT)
Dept: PHARMACY | Facility: MEDICAL CENTER | Age: 60
End: 2025-08-12
Payer: COMMERCIAL

## 2025-08-13 ENCOUNTER — APPOINTMENT (OUTPATIENT)
Dept: CARDIOLOGY | Facility: MEDICAL CENTER | Age: 60
End: 2025-08-13
Attending: INTERNAL MEDICINE
Payer: COMMERCIAL

## 2025-08-26 ENCOUNTER — APPOINTMENT (OUTPATIENT)
Dept: ENDOCRINOLOGY | Facility: MEDICAL CENTER | Age: 60
End: 2025-08-26
Attending: INTERNAL MEDICINE
Payer: COMMERCIAL

## 2025-09-15 ENCOUNTER — APPOINTMENT (OUTPATIENT)
Dept: MEDICAL GROUP | Facility: PHYSICIAN GROUP | Age: 60
End: 2025-09-15
Payer: COMMERCIAL

## (undated) DEVICE — GLOVE BIOGEL ECLIPSE  PF LATEX SIZE 6.5 (50PR/BX)

## (undated) DEVICE — GOWN SURGICAL XX-LARGE - (28EA/CA) SIRUS NON REINFORCED

## (undated) DEVICE — DRESSING 3X8 ADAPTIC GAUZE - NON-ADHERING (36/PK 6PK/BX)

## (undated) DEVICE — SUTURE GENERAL

## (undated) DEVICE — PAD LAP STERILE 18 X 18 - (5/PK 40PK/CA)

## (undated) DEVICE — SET IRRIGATION CYSTOSCOPY TUBE L80 IN (20EA/CA)

## (undated) DEVICE — SET EXTENSION WITH 2 PORTS (48EA/CA) ***PART #2C8610 IS A SUBSTITUTE*****

## (undated) DEVICE — GLOVE BIOGEL ECLIPSE PF LATEX SIZE 8.0  (50PR/BX)

## (undated) DEVICE — PADDING CAST 6 IN STERILE - 6 X 4 YDS (24/CA)

## (undated) DEVICE — MASK ANESTHESIA ADULT  - (100/CA)

## (undated) DEVICE — KIT ROOM DECONTAMINATION

## (undated) DEVICE — GOWN SURGEONS X-LARGE - DISP. (30/CA)

## (undated) DEVICE — BLADE SURGICAL CLIPPER - (50EA/CA)

## (undated) DEVICE — SODIUM CHL IRRIGATION 0.9% 1000ML (12EA/CA)

## (undated) DEVICE — SCRUB SOLUTION EXIDINE 4% 40Z - 48/CS CHLORAHEXADINE GLUCONATE

## (undated) DEVICE — PADDING CAST 4 IN STERILE - 4 X 4 YDS (24/CA)

## (undated) DEVICE — SLEEVE, VASO, THIGH, MED

## (undated) DEVICE — GLOVE BIOGEL SZ 8 SURGICAL PF LTX - (50PR/BX 4BX/CA)

## (undated) DEVICE — LACTATED RINGERS INJ 1000 ML - (14EA/CA 60CA/PF)

## (undated) DEVICE — MASK OXYGEN VNYL ADLT MED CONC WITH 7 FOOT TUBING - (50EA/CA)

## (undated) DEVICE — SET LEADWIRE 5 LEAD BEDSIDE DISPOSABLE ECG (1SET OF 5/EA)

## (undated) DEVICE — BLADE SURGICAL #10 - (50/BX)

## (undated) DEVICE — HEAD HOLDER JUNIOR/ADULT

## (undated) DEVICE — GLOVE BIOGEL SZ 6 PF LATEX - (50EA/BX 4BX/CA)

## (undated) DEVICE — SUTURE ETHILON 2-0 FSLX 30 (36PK/BX)"

## (undated) DEVICE — BANDAGE ROLL STERILE BULKEE 4.5 IN X 4 YD (100EA/CA)

## (undated) DEVICE — DRESSING PETROLEUM GAUZE 5 X 9" (50EA/BX 4BX/CA)"

## (undated) DEVICE — GLOVE BIOGEL INDICATOR SZ 6.5 SURGICAL PF LTX - (50PR/BX 4BX/CA)

## (undated) DEVICE — GOWN WARMING STANDARD FLEX - (30/CA)

## (undated) DEVICE — PADDING CAST 4 IN X 4 YDS - SOF-ROLL (12RL/BG 6BG/CT)

## (undated) DEVICE — WATER IRRIG. STER. 1500 ML - (9/CA)

## (undated) DEVICE — STAPLER SKIN DISP - (6/BX 10BX/CA) VISISTAT

## (undated) DEVICE — MASK, LARYNGEAL AIRWAY #4

## (undated) DEVICE — SUTURE 2-0 VICRYL PLUS CT-1 - 8 X 18 INCH(12/BX)

## (undated) DEVICE — TRAY SRGPRP PVP IOD WT PRP - (20/CA)

## (undated) DEVICE — SLEEVE VASO DVT COMPRESSION CALF MED - (10PR/CA)

## (undated) DEVICE — IMMOBILIZER KNEE 20 INCH - (1/EA)

## (undated) DEVICE — SUTURE 0 SILK TIES (36PK/BX)

## (undated) DEVICE — PROTECTOR ULNA NERVE - (36PR/CA)

## (undated) DEVICE — BANDAGE ELASTIC 4 IN X 5 YDS - LATEX FREE(10/BX 5BX/CA)

## (undated) DEVICE — SENSOR SPO2 NEO LNCS ADHESIVE (20/BX) SEE USER NOTES

## (undated) DEVICE — GLOVE BIOGEL PI ORTHO SZ 8 PF LF (40PR/BX)

## (undated) DEVICE — ELECTRODE DUAL RETURN W/ CORD - (50/PK)

## (undated) DEVICE — PACK UPPER EXTREMITY (2EA/CA)

## (undated) DEVICE — GLOVE BIOGEL PI INDICATOR SZ 7.0 SURGICAL PF LF - (50/BX 4BX/CA)

## (undated) DEVICE — ELECTRODE 850 FOAM ADHESIVE - HYDROGEL RADIOTRNSPRNT (50/PK)

## (undated) DEVICE — DRAPE U SPLIT IMP 54 X 76 - (24/CA)

## (undated) DEVICE — BLADE SAGITTAL DUAL 25MM

## (undated) DEVICE — NEPTUNE 4 PORT MANIFOLD - (20/PK)

## (undated) DEVICE — TOURNIQUET CUFF 34 X 4 ONE PORT DISP - STERILE (10/BX)

## (undated) DEVICE — HANDPIECE 10FT INTPLS SCT PLS IRRIGATION HAND CONTROL SET (6/PK)

## (undated) DEVICE — GLOVE BIOGEL SZ 7 SURGICAL PF LTX - (50PR/BX 4BX/CA)

## (undated) DEVICE — SUTURE 3-0 VICRYL PLUS SH - 8X 18 INCH (12/BX)

## (undated) DEVICE — NEEDLE NON SAFETY HYPO 22 GA X 1 1/2 IN (100/BX)

## (undated) DEVICE — BANDAGE ELASTIC 6 HONEYCOMB - 6X5YD LF (20/CA)"

## (undated) DEVICE — COVER LIGHT HANDLE FLEXIBLE - SOFT (2EA/PK 80PK/CA)

## (undated) DEVICE — SUTURE 0 VICRYL PLUS CT-1 - 36 INCH (36/BX)

## (undated) DEVICE — SWAB CULTURE AMIES ESWAB (50EA/PK) ***50EA/PK****

## (undated) DEVICE — VAC CANISTER W/GEL 500ML - FITS NEW MACHINES (10EA/CA)

## (undated) DEVICE — PACK LOWER EXTREMITY - (2/CA)

## (undated) DEVICE — KIT ANESTHESIA W/CIRCUIT & 3/LT BAG W/FILTER (20EA/CA)

## (undated) DEVICE — CANNULA O2 COMFORT SOFT EAR ADULT 7 FT TUBING (50/CA)

## (undated) DEVICE — TUBING CLEARLINK DUO-VENT - C-FLO (48EA/CA)

## (undated) DEVICE — DRAPE LAPAROTOMY T SHEET - (12EA/CA)

## (undated) DEVICE — TIP INTPLS HFLO ML ORFC BTRY - (12/CS) FOR SURGILAV

## (undated) DEVICE — SYRINGE 10 ML CONTROL LL (25EA/BX 4BX/CA)

## (undated) DEVICE — TUBE E-T HI-LO CUFF 7.5MM (10EA/PK)

## (undated) DEVICE — SODIUM CHL. IRRIGATION 0.9% 3000ML (4EA/CA 65CA/PF)

## (undated) DEVICE — SUCTION INSTRUMENT YANKAUER BULBOUS TIP W/O VENT (50EA/CA)

## (undated) DEVICE — GLOVE BIOGEL PI INDICATOR SZ 6.5 SURGICAL PF LF - (50/BX 4BX/CA)

## (undated) DEVICE — DRAPE LARGE 3 QUARTER - (20/CA)

## (undated) DEVICE — BLADE SURGICAL #15 - (50/BX 3BX/CA)

## (undated) DEVICE — CANISTER SUCTION 3000ML MECHANICAL FILTER AUTO SHUTOFF MEDI-VAC NONSTERILE LF DISP  (40EA/CA)

## (undated) DEVICE — PAD PREP 24 X 48 CUFFED - (100/CA)

## (undated) DEVICE — SUTURE 3-0 VICRYL PLUS FS-1 27 (36PK/BX)"

## (undated) DEVICE — SUTURE 3-0 ETHILON PS-1 (36PK/BX)

## (undated) DEVICE — KIT EVACUATER 3 SPRING PVC LF 1/8 DRAIN SIZE (10EA/CA)"

## (undated) DEVICE — SOD. CHL. INJ. 0.9% 1000 ML - (14EA/CA 60CA/PF)

## (undated) DEVICE — GLOVE BIOGEL INDICATOR SZ 7.5 SURGICAL PF LTX - (50PR/BX 4BX/CA)

## (undated) DEVICE — SUTURE 1 VICRYL PLUS CTX - 8 X 18 INCH (12/BX)

## (undated) DEVICE — BANDAGE ELASTIC 4 HONEYCOMB - 4"X5YD LF (20/CA)"

## (undated) DEVICE — CHLORAPREP 26 ML APPLICATOR - ORANGE TINT(25/CA)

## (undated) DEVICE — CANISTER SUCTION 3000ML MECHANICAL FILTER AUTO SHUTOFF MEDI-VAC NONSTERILE LF DISP (40EA/CA)

## (undated) DEVICE — DRESSING, WOUND VAC MED.

## (undated) DEVICE — CANISTER SUCTION RIGID RED 1500CC (40EA/CA)

## (undated) DEVICE — TUBE CONNECTING SUCTION - CLEAR PLASTIC STERILE 72 IN (50EA/CA)

## (undated) DEVICE — DRAPE SURGICAL U 77X120 - (10/CA)

## (undated) DEVICE — SENSOR OXIMETER ADULT SPO2 RD SET (20EA/BX)

## (undated) DEVICE — BLADE SAW 90X25X1.37MM SAGITTAL DUAL CUT

## (undated) DEVICE — SUTURE 2-0 ETHILON FS - (36/BX) 18 INCH

## (undated) DEVICE — TRAY SKIN SCRUB PVP WET (20EA/CA) PART #DYND70356 DISCONTINUED

## (undated) DEVICE — GLOVE BIOGEL PI INDICATOR SZ 8.0 SURGICAL PF LF -(50/BX 4BX/CA)

## (undated) DEVICE — SUTURE 2-0 VICRYL PLUS CT-1 36 (36PK/BX)"

## (undated) DEVICE — WRAP COBAN SELF-ADHERENT 6 IN X  5YDS STERILE TAN (12/CA)

## (undated) DEVICE — SUTURE 3-0 ETHILON FS-1 - (36/BX) 30 INCH